# Patient Record
Sex: MALE | Race: ASIAN | NOT HISPANIC OR LATINO | Employment: FULL TIME | URBAN - METROPOLITAN AREA
[De-identification: names, ages, dates, MRNs, and addresses within clinical notes are randomized per-mention and may not be internally consistent; named-entity substitution may affect disease eponyms.]

---

## 2017-05-22 ENCOUNTER — TELEPHONE (OUTPATIENT)
Dept: FAMILY MEDICINE | Facility: CLINIC | Age: 47
End: 2017-05-22

## 2017-08-02 DIAGNOSIS — I25.10 CVD (CARDIOVASCULAR DISEASE): ICD-10-CM

## 2017-08-02 DIAGNOSIS — E78.5 HYPERLIPIDEMIA LDL GOAL <100: ICD-10-CM

## 2017-08-02 RX ORDER — ROSUVASTATIN CALCIUM 40 MG/1
40 TABLET, COATED ORAL DAILY
Qty: 90 TABLET | Refills: 1 | Status: SHIPPED | OUTPATIENT
Start: 2017-08-02 | End: 2017-11-03

## 2017-11-02 ENCOUNTER — PRE VISIT (OUTPATIENT)
Dept: CARDIOLOGY | Facility: CLINIC | Age: 47
End: 2017-11-02

## 2017-11-03 ENCOUNTER — OFFICE VISIT (OUTPATIENT)
Dept: CARDIOLOGY | Facility: CLINIC | Age: 47
End: 2017-11-03
Attending: INTERNAL MEDICINE
Payer: COMMERCIAL

## 2017-11-03 VITALS
BODY MASS INDEX: 27.97 KG/M2 | DIASTOLIC BLOOD PRESSURE: 82 MMHG | SYSTOLIC BLOOD PRESSURE: 118 MMHG | WEIGHT: 174 LBS | HEIGHT: 66 IN | HEART RATE: 103 BPM

## 2017-11-03 DIAGNOSIS — I25.2 OLD MYOCARDIAL INFARCTION: ICD-10-CM

## 2017-11-03 DIAGNOSIS — E78.5 HYPERLIPIDEMIA LDL GOAL <100: ICD-10-CM

## 2017-11-03 DIAGNOSIS — I25.10 CVD (CARDIOVASCULAR DISEASE): ICD-10-CM

## 2017-11-03 DIAGNOSIS — I10 BENIGN ESSENTIAL HYPERTENSION: ICD-10-CM

## 2017-11-03 PROCEDURE — 99214 OFFICE O/P EST MOD 30 MIN: CPT | Performed by: INTERNAL MEDICINE

## 2017-11-03 RX ORDER — ROSUVASTATIN CALCIUM 40 MG/1
40 TABLET, COATED ORAL DAILY
Qty: 90 TABLET | Refills: 3 | Status: SHIPPED | OUTPATIENT
Start: 2017-11-03 | End: 2018-11-19

## 2017-11-03 NOTE — LETTER
11/3/2017    [facility]  [address] [facility] [address]  [facility] [address]    RE: June Ronquillo       Dear Colleague,    I had the pleasure of seeing June Ronquillo in the [facility].    It is a pleasure for me to see Mr. Ronquillo.  This is a gentleman with a history of premature onset of coronary artery disease.  He presented with an acute inferoposterior STEMI in 2004.  He was treated emergently at [facility].  A 90% mid circumflex stenosis was successfully angioplastied.  A drug-eluting stent was placed.  He has done well since then.  He has no chest pain, shortness of breath.  He feels well.  Ejection fraction is normal.  He is tolerating all medications well.  Cardiac examination is normal.     Outpatient Encounter Prescriptions as of 11/3/2017   Medication Sig Dispense Refill     rosuvastatin (CRESTOR) 40 MG tablet Take 1 tablet (40 mg) by mouth daily 90 tablet 3     azithromycin (ZITHROMAX Z-DONA) 250 MG tablet two tablets first day and 1 tablet daily for 4 days 6 tablet 0     atovaquone-proguanil (MALARONE) 250-100 MG per tablet Take 1 tablet by mouth daily Start 2 days before exposure to Malaria and continue daily till  7 days after exposure. 23 tablet 0     ASPIRIN 81 MG OR TABS 1 TABLET DAILY       FISH OIL 1000 MG OR CAPS 1 bid       [DISCONTINUED] rosuvastatin (CRESTOR) 40 MG tablet Take 1 tablet (40 mg) by mouth daily 90 tablet 1     [DISCONTINUED] fluticasone (FLONASE) 50 MCG/ACT spray Spray 1-2 sprays into both nostrils daily 16 g 0     [DISCONTINUED] guaiFENesin-codeine (ROBITUSSIN AC) 100-10 MG/5ML SOLN solution Take 5 mLs by mouth every 4 hours as needed for cough 120 mL 0     No facility-administered encounter medications on file as of 11/3/2017.       IMPRESSION:   1.  Stable coronary artery disease.   2.  Blood pressure.  Appears definitely better this year.   3.  Hyperlipidemia.  He is on high dose Crestor.  Last year's LDL is 72 which I think  is excellent.  I informed him that there is no need to check his lipid numbers on a regular basis.      I wished him well.  I will see him again in a year's time for further followup.     Again, thank you for allowing me to participate in the care of your patient.      Sincerely,    DR GRACIELA DEL CID MD     [facility]

## 2017-11-03 NOTE — MR AVS SNAPSHOT
"              After Visit Summary   11/3/2017    June Ronquillo    MRN: [id number]           Patient Information     Date Of Birth          1970        Visit Information        Provider Department      11/3/2017 2:45 PM Dominguez Craig MD [facility]        Today's Diagnoses     Old myocardial infarction        Benign essential hypertension        Hyperlipidemia LDL goal <100           Follow-ups after your visit        Who to contact     If you have questions or need follow up information about today's clinic visit or your schedule please contact [facility] directly at [phone].  Normal or non-critical lab and imaging results will be communicated to you by HotPad[employer]art, letter or phone within 4 business days after the clinic has received the results. If you do not hear from us within 7 days, please contact the clinic through HotPadshart or phone. If you have a critical or abnormal lab result, we will notify you by phone as soon as possible.  Submit refill requests through [employer] or call your pharmacy and they will forward the refill request to us. Please allow 3 business days for your refill to be completed.          Additional Information About Your Visit        MyChart Information     [employer] gives you secure access to your electronic health record. If you see a primary care provider, you can also send messages to your care team and make appointments. If you have questions, please call your primary care clinic.  If you do not have a primary care provider, please call [phone] and they will assist you.        Care EveryWhere ID     This is your Care EveryWhere ID. This could be used by other organizations to access your [place] medical records  [id number]        Your Vitals Were     Pulse Height BMI (Body Mass Index)             103 1.676 m (5' 6\") 28.08 kg/m2          Blood Pressure from Last 3 Encounters:   11/03/17 " 118/82   12/15/16 (!) 136/91   11/01/16 138/90    Weight from Last 3 Encounters:   11/03/17 78.9 kg (174 lb)   12/15/16 77.1 kg (170 lb)   11/09/16 78 kg (172 lb)              We Performed the Following     Follow-Up with Cardiologist        Primary Care Provider Office Phone # Fax #    [facility] [phone]       [address] [address]        Equal Access to Services     EDIE AHMADI : Hadii aad ku hadasho Soomaali, waaxda luqadaha, qaybta kaalmada adeegyada, waxay idiin hayaan adeza wooten . So [facility] [place] [facility] [phone].    ATENCIÓN: Si habla español, tiene a alfaro disposición servicios gratuitos de asistencia lingüística. Zaynab al [phone].    We comply with applicable federal civil rights laws and [place] laws. We do not discriminate on the basis of race, color, national origin, age, disability, sex, sexual orientation, or gender identity.            Thank you!     Thank you for choosing [facility]  for your care. Our goal is always to provide you with excellent care. Hearing back from our patients is one way we can continue to improve our services. Please take a few minutes to complete the written survey that you may receive in the mail after your visit with us. Thank you!             Your Updated Medication List - Protect others around you: Learn how to safely use, store and throw away your medicines at www.disposemymeds.org.          This list is accurate as of: 11/3/17  3:14 PM.  Always use your most recent med list.                   Brand Name Dispense Instructions for use Diagnosis    aspirin 81 MG tablet      1 TABLET DAILY        atovaquone-proguanil 250-100 MG per tablet    MALARONE    23 tablet    Take 1 tablet by mouth daily Start 2 days before exposure to Malaria and continue daily till  7 days after exposure.    Travel advice encounter       azithromycin 250 MG tablet    ZITHROMAX Z-DONA    6 tablet     two tablets first day and 1 tablet daily for 4 days    Productive cough       fish oil-omega-3 fatty acids 1000 MG capsule      1 bid        rosuvastatin 40 MG tablet    CRESTOR    90 tablet    Take 1 tablet (40 mg) by mouth daily    CVD (cardiovascular disease), Hyperlipidemia LDL goal <100

## 2017-11-03 NOTE — PROGRESS NOTES
HPI and Plan:   See dictation    No orders of the defined types were placed in this encounter.      Orders Placed This Encounter   Medications     rosuvastatin (CRESTOR) 40 MG tablet     Sig: Take 1 tablet (40 mg) by mouth daily     Dispense:  90 tablet     Refill:  3       Encounter Diagnoses   Name Primary?     Old myocardial infarction      Benign essential hypertension      Hyperlipidemia LDL goal <100      CVD (cardiovascular disease)        CURRENT MEDICATIONS:  Current Outpatient Prescriptions   Medication Sig Dispense Refill     rosuvastatin (CRESTOR) 40 MG tablet Take 1 tablet (40 mg) by mouth daily 90 tablet 3     azithromycin (ZITHROMAX Z-DONA) 250 MG tablet two tablets first day and 1 tablet daily for 4 days 6 tablet 0     atovaquone-proguanil (MALARONE) 250-100 MG per tablet Take 1 tablet by mouth daily Start 2 days before exposure to Malaria and continue daily till  7 days after exposure. 23 tablet 0     ASPIRIN 81 MG OR TABS 1 TABLET DAILY       FISH OIL 1000 MG OR CAPS 1 bid       [DISCONTINUED] rosuvastatin (CRESTOR) 40 MG tablet Take 1 tablet (40 mg) by mouth daily 90 tablet 1       ALLERGIES     Allergies   Allergen Reactions     Nkda [No Known Drug Allergies]        PAST MEDICAL HISTORY:  Past Medical History:   Diagnosis Date     Angina pectoris 2002     CAD (coronary artery disease)     s/p stent to CFX     Hyperlipidemia LDL goal <100      Hypertension        PAST SURGICAL HISTORY:  Past Surgical History:   Procedure Laterality Date     HEART CATH PTCA SINGLE VESSEL  10/10/2004    Stent to CFX - [facility]        FAMILY HISTORY:  Family History   Problem Relation Age of Onset     Hypertension Mother      Hyperlipidemia Mother      HEART DISEASE Paternal Grandmother      Hyperlipidemia Sister      Hyperlipidemia Sister        SOCIAL HISTORY:  Social History     Social History     Marital status:      Spouse name: N/A     Number of children: N/A     Years of education: N/A     Social  "History Main Topics     Smoking status: Never Smoker     Smokeless tobacco: Never Used     Alcohol use Yes      Comment: 1-2/mo or less     Drug use: No     Sexual activity: Yes     Partners: Female     Other Topics Concern     Parent/Sibling W/ Cabg, Mi Or Angioplasty Before 65f 55m? No     Caffeine Concern No     Special Diet No     Exercise No     Seat Belt Yes     Social History Narrative       Review of Systems:  Skin:        Eyes:  Positive for contacts  ENT:  Negative    Respiratory:  Negative    Cardiovascular:  Negative;palpitations;chest pain;cyanosis;exercise intolerance;lightheadedness;dizziness;syncope or near-syncope;edema    Gastroenterology: Negative    Genitourinary:  Negative    Musculoskeletal:  Negative    Neurologic:  Positive for headaches  Psychiatric:  Negative    Heme/Lymph/Imm:  Negative    Endocrine:  Negative      Physical Exam:  Vitals: /82 (BP Location: Left arm, Cuff Size: Adult Large)  Pulse 103  Ht 1.676 m (5' 6\")  Wt 78.9 kg (174 lb)  BMI 28.08 kg/m2    Constitutional:           Skin:             Head:           Eyes:           Lymph:      ENT:           Neck:           Respiratory:            Cardiac:                                                           GI:           Extremities and Muscular Skeletal:                 Neurological:           Psych:           Recent Lab Results:  LIPID RESULTS:  Lab Results   Component Value Date    CHOL 145 11/01/2016    HDL 42 11/01/2016    LDL 72 11/01/2016    TRIG 154 (H) 11/01/2016    CHOLHDLRATIO 4.2 06/19/2015       LIVER ENZYME RESULTS:  Lab Results   Component Value Date    AST 35 10/18/2012    ALT 23 10/18/2012       CBC RESULTS:  Lab Results   Component Value Date    WBC 8.1 10/18/2012    RBC 4.35 (L) 10/18/2012    HGB 13.8 10/18/2012    HCT 41.7 10/18/2012    MCV 96 10/18/2012    MCH 31.8 10/18/2012    MCHC 33.2 10/18/2012    RDW 14.2 10/18/2012     10/18/2012       BMP RESULTS:  Lab Results   Component Value Date    "  10/18/2012    POTASSIUM 4.1 10/18/2012    CHLORIDE 104 10/18/2012    CO2 19 (L) 10/18/2012    ANIONGAP 14 10/18/2012     (H) 10/18/2012    BUN 20 10/18/2012    CR 1.02 10/18/2012    GFRESTIMATED 79 10/18/2012    GFRESTBLACK >90 10/18/2012    HARDEEP 9.1 10/18/2012        A1C RESULTS:  No results found for: A1C    INR RESULTS:  No results found for: INR        CC  Dominguez Craig MD  [address]  [place] [address]

## 2017-11-03 NOTE — PROGRESS NOTES
HISTORY OF PRESENT ILLNESS:  It is a pleasure for me to see Mr. Ronquillo.  This is a gentleman with a history of premature onset of coronary artery disease.  He presented with an acute inferoposterior STEMI in .  He was treated emergently at [facility].  A 90% mid circumflex stenosis was successfully angioplastied.  A drug-eluting stent was placed.  He has done well since then.  He has no chest pain, shortness of breath.  He feels well.  Ejection fraction is normal.  He is tolerating all medications well.  Cardiac examination is normal.      IMPRESSION:   1.  Stable coronary artery disease.   2.  Blood pressure.  Appears definitely better this year.   3.  Hyperlipidemia.  He is on high dose Crestor.  Last year's LDL is 72 which I think is excellent.  I informed him that there is no need to check his lipid numbers on a regular basis.      I wished him well.  I will see him again in a year's time for further followup.         GRACIELA DEL CID MD, [facility]             D: 2017 15:30   T: 2017 15:43   MT: MIRELLA      Name:     LÁZARO RONQUILLO   MRN:      [id number]        Account:      [id number]   :      1970           Service Date: 2017      Document: [id number]

## 2018-09-17 ENCOUNTER — OFFICE VISIT (OUTPATIENT)
Dept: FAMILY MEDICINE | Facility: CLINIC | Age: 48
End: 2018-09-17
Payer: COMMERCIAL

## 2018-09-17 VITALS
OXYGEN SATURATION: 98 % | BODY MASS INDEX: 28.23 KG/M2 | WEIGHT: 174.9 LBS | HEART RATE: 85 BPM | SYSTOLIC BLOOD PRESSURE: 132 MMHG | TEMPERATURE: 97.9 F | RESPIRATION RATE: 18 BRPM | DIASTOLIC BLOOD PRESSURE: 84 MMHG

## 2018-09-17 DIAGNOSIS — L91.8 SKIN TAG: ICD-10-CM

## 2018-09-17 DIAGNOSIS — Z12.11 SPECIAL SCREENING FOR MALIGNANT NEOPLASMS, COLON: ICD-10-CM

## 2018-09-17 DIAGNOSIS — Z00.00 ROUTINE GENERAL MEDICAL EXAMINATION AT A HEALTH CARE FACILITY: Primary | ICD-10-CM

## 2018-09-17 DIAGNOSIS — Z23 NEED FOR PROPHYLACTIC VACCINATION AND INOCULATION AGAINST INFLUENZA: ICD-10-CM

## 2018-09-17 DIAGNOSIS — L81.9 CHANGING PIGMENTED SKIN LESION: ICD-10-CM

## 2018-09-17 PROCEDURE — 90471 IMMUNIZATION ADMIN: CPT | Performed by: NURSE PRACTITIONER

## 2018-09-17 PROCEDURE — 90686 IIV4 VACC NO PRSV 0.5 ML IM: CPT | Performed by: NURSE PRACTITIONER

## 2018-09-17 PROCEDURE — 99396 PREV VISIT EST AGE 40-64: CPT | Mod: 25 | Performed by: NURSE PRACTITIONER

## 2018-09-17 NOTE — PROGRESS NOTES
SUBJECTIVE:   CC: June Ronquillo is an 48 year old male who presents for preventative health visit.     Healthy Habits:    Do you get at least three servings of calcium containing foods daily (dairy, green leafy vegetables, etc.)? yes    Amount of exercise or daily activities, outside of work: 0 day(s) per week    Problems taking medications regularly: No    Medication side effects: No    Have you had an eye exam in the past two years? yes    Do you see a dentist twice per year? yes    Do you have sleep apnea, excessive snoring or daytime drowsiness? Wife says she thinks he has sleep apnea    History of CAD, saw Card 12/2013: from their note he had an acute inferoposterior myocardial infarction in 2004.  He was [facility] emerg[facility] at [facility]  No symptoms since, is on a statin     Today's PHQ-2 Score:   PHQ-2 ( 1999 [employer]) 5/17/2016 10/9/2014   Q1: Little interest or pleasure in doing things 0 0   Q2: Feeling down, depressed or hopeless 0 0   PHQ-2 Score 0 0       Abuse: Current or Past(Physical, Sexual or Emotional)- No  Do you feel safe in your environment - Yes    Social History   Substance Use Topics     Smoking status: Never Smoker     Smokeless tobacco: Never Used     Alcohol use Yes      Comment: 1-2/mo or less      If you drink alcohol do you typically have >3 drinks per day or >7 drinks per week? No                      Last PSA: No results found for: PSA    Reviewed orders with patient. Reviewed health maintenance and updated orders accordingly - Yes  Labs reviewed in EPIC  BP Readings from Last 3 Encounters:   09/17/18 132/84   11/03/17 118/82   12/15/16 (!) 136/91    Wt Readings from Last 3 Encounters:   09/17/18 174 lb 14.4 oz (79.3 kg)   11/03/17 174 lb (78.9 kg)   12/15/16 170 lb (77.1 kg)                  Patient Active Problem List   Diagnosis     CARDIOVASCULAR SCREENING; LDL GOAL LESS THAN 100     FH: heart disease     Overweight (BMI 25.0-29.9)     Hyperlipidemia LDL goal <100      Concussion without loss of consciousness, initial encounter     Concussion without loss of consciousness, subsequent encounter     Hypertension     CAD (coronary artery disease)     Past Surgical History:   Procedure Laterality Date     HEART CATH PTCA SINGLE VESSEL  10/10/2004    Stent to CFX - [facility]        Social History   Substance Use Topics     Smoking status: Never Smoker     Smokeless tobacco: Never Used     Alcohol use Yes      Comment: 1-2/mo or less     Family History   Problem Relation Age of Onset     Hypertension Mother      Hyperlipidemia Mother      HEART DISEASE Paternal Grandmother      Hyperlipidemia Sister      Hyperlipidemia Sister          Current Outpatient Prescriptions   Medication Sig Dispense Refill     ASPIRIN 81 MG OR TABS 1 TABLET DAILY       FISH OIL 1000 MG OR CAPS 1 bid       rosuvastatin (CRESTOR) 40 MG tablet Take 1 tablet (40 mg) by mouth daily 90 tablet 3     atovaquone-proguanil (MALARONE) 250-100 MG per tablet Take 1 tablet by mouth daily Start 2 days before exposure to Malaria and continue daily till  7 days after exposure. (Patient not taking: Reported on 9/17/2018) 23 tablet 0     azithromycin (ZITHROMAX Z-DONA) 250 MG tablet two tablets first day and 1 tablet daily for 4 days (Patient not taking: Reported on 9/17/2018) 6 tablet 0     Allergies   Allergen Reactions     Nkda [No Known Drug Allergies]      Recent Labs   Lab Test  11/01/16   0709  05/11/16   0828  06/19/15   0916   10/18/12   0943   LDL  72  76  82   < >  113   HDL  42  45  41   < >  40   TRIG  154*  133  257*   < >  258*   ALT   --    --    --    --   23   CR   --    --    --    --   1.02   GFRESTIMATED   --    --    --    --   79   GFRESTBLACK   --    --    --    --   >90   POTASSIUM   --    --    --    --   4.1    < > = values in this interval not displayed.        Reviewed and updated as needed this visit by clinical staff  Tobacco  Allergies  Meds         Reviewed and updated as needed this  visit by Provider        Past Medical History:   Diagnosis Date     Angina pectoris 2002     CAD (coronary artery disease)     s/p stent to CFX     Hyperlipidemia LDL goal <100      Hypertension       Past Surgical History:   Procedure Laterality Date     HEART CATH PTCA SINGLE VESSEL  10/10/2004    Stent to CFX - [facility]        ROS:  CONSTITUTIONAL: NEGATIVE for fever, chills, change in weight  INTEGUMENTARY/SKIN: leg growth for 22 year, has enlarged no itching or pain, no history of skin or other cancer   EYES: NEGATIVE for vision changes or irritation  ENT: NEGATIVE for ear, mouth and throat problems  RESP: NEGATIVE for significant cough or SOB  CV: NEGATIVE for chest pain, palpitations or peripheral edema  GI: NEGATIVE for nausea, abdominal pain, heartburn, or change in bowel habits   male: negative for dysuria, hematuria, decreased urinary stream, erectile dysfunction, urethral discharge  MUSCULOSKELETAL: NEGATIVE for significant arthralgias or myalgia  NEURO: NEGATIVE for weakness, dizziness or paresthesias  PSYCHIATRIC: NEGATIVE for changes in mood or affect    OBJECTIVE:   /84  Pulse 85  Temp 97.9  F (36.6  C) (Oral)  Resp 18  Wt 174 lb 14.4 oz (79.3 kg)  SpO2 98%  BMI 28.23 kg/m2  EXAM:  GENERAL: healthy, alert and no distress  EYES: Eyes grossly normal to inspection, PERRL and conjunctivae and sclerae normal  HENT: ear canals and TM's normal, nose and mouth without ulcers or lesions  NECK: no adenopathy, no asymmetry, masses, or scars and thyroid normal to palpation  RESP: lungs clear to auscultation - no rales, rhonchi or wheezes  CV: regular rate and rhythm, normal S1 S2, no S3 or S4, no murmur, click or rub, no peripheral edema and peripheral pulses strong  ABDOMEN: soft, nontender, no hepatosplenomegaly, no masses and bowel sounds normal   (male): normal male genitalia without lesions or urethral discharge, no hernia  RECTAL: normal sphincter tone, no rectal masses, prostate  "normal size, smooth, nontender without nodules or masses  MS: no gross musculoskeletal defects noted, no edema  SKIN: right arch of foot irreg brown lesion, 3 mm, right posterior thigh skin tag 1 cm approx   NEURO: Normal strength and tone, mentation intact and speech normal  PSYCH: mentation appears normal, affect normal/bright    Diagnostic Test Results:  none     ASSESSMENT/PLAN:       ICD-10-CM    1. Routine general medical examination at a health care facility Z00.00    2. Need for prophylactic vaccination and inoculation against influenza Z23 FLU VACCINE, SPLIT VIRUS, IM (QUADRIVALENT) [80667]- >3 YRS     Vaccine Administration, Initial [47540]   3. Changing pigmented skin lesion L81.9 DERMATOLOGY REFERRAL    right foot   4. Skin tag L91.8 DERMATOLOGY REFERRAL   5. Special screening for malignant neoplasms, colon Z12.11 GASTROENTEROLOGY ADULT REF PROCEDURE ONLY       COUNSELING:  Reviewed preventive health counseling, as reflected in patient instructions    BP Readings from Last 1 Encounters:   09/17/18 132/84     Estimated body mass index is 28.23 kg/(m^2) as calculated from the following:    Height as of 11/3/17: 5' 6\" (1.676 m).    Weight as of this encounter: 174 lb 14.4 oz (79.3 kg).    BP Screening:   Last 3 BP Readings:    BP Readings from Last 3 Encounters:   09/17/18 132/84   11/03/17 118/82   12/15/16 (!) 136/91       The following was recommended to the patient:  Re-screen BP within a year and recommended lifestyle modifications  Weight management plan: Discussed healthy diet and exercise guidelines and patient will follow up in 12 months in clinic to re-evaluate.     reports that he has never smoked. He has never used smokeless tobacco.      Counseling Resources:  ATP IV Guidelines  Pooled Cohorts Equation Calculator  FRAX Risk Assessment  ICSI Preventive Guidelines  Dietary Guidelines for Americans, 2010  USDA's MyPlate  ASA Prophylaxis  Lung CA Screening    PATTY Stark CNP  [facility] " [facility]    Injectable Influenza Immunization Documentation    1.  Is the person to be vaccinated sick today?   No    2. Does the person to be vaccinated have an allergy to a component   of the vaccine?   No  Egg Allergy Algorithm Link    3. Has the person to be vaccinated ever had a serious reaction   to influenza vaccine in the past?   No    4. Has the person to be vaccinated ever had Guillain-Barré syndrome?   No    Form completed by La Ch[place] MA

## 2018-09-17 NOTE — MR AVS SNAPSHOT
After Visit Summary   9/17/2018    June Ronquillo    MRN: [id number]           Patient Information     Date Of Birth          1970        Visit Information        Provider Department      9/17/2018 2:00 PM Selma Johnson APRN [facility]        Today's Diagnoses     Routine general medical examination at a health care facility    -  1    Need for prophylactic vaccination and inoculation against influenza        Changing pigmented skin lesion        Skin tag        Special screening for malignant neoplasms, colon          Care Instructions      Preventive Health Recommendations  Male Ages 50 - 64    Yearly exam:             See your health care provider every year in order to  o   Review health changes.   o   Discuss preventive care.    o   Review your medicines if your doctor has prescribed any.     Have a cholesterol test every 5 years, or more frequently if you are at risk for high cholesterol/heart disease.     Have a diabetes test (fasting glucose) every three years. If you are at risk for diabetes, you should have this test more often.     Have a colonoscopy at age 50, or have a yearly FIT test (stool test). These exams will check for colon cancer.      Talk with your health care provider about whether or not a prostate cancer screening test (PSA) is right for you.    You should be tested each year for STDs (sexually transmitted diseases), if you re at risk.     Shots: Get a flu shot each year. Get a tetanus shot every 10 years.     Nutrition:    Eat at least 5 servings of fruits and vegetables daily.     Eat whole-grain bread, whole-wheat pasta and brown rice instead of white grains and rice.     Get adequate Calcium and Vitamin D.     Lifestyle    Exercise for at least 150 minutes a week (30 minutes a day, 5 days a week). This will help you control your weight and prevent disease.     Limit alcohol to one drink per day.     No smoking.     Wear sunscreen to  prevent skin cancer.     See your dentist every six months for an exam and cleaning.     See your eye doctor every 1 to 2 years.            Follow-ups after your visit        Additional Services     DERMATOLOGY REFERRAL       Your provider has referred you to: [facility]: Dermatology Clinic [facility] [phone]   [email or web address]  Right foot changing mole in arch, also skin tag right thigh he'd like removed.     Please be aware that coverage of these services is subject to the terms and limitations of your health insurance plan.  Call member services at your health plan with any benefit or coverage questions.      Please bring the following with you to your appointment:    (1) Any X-Rays, CTs or MRIs which have been performed.  Contact the facility where they were done to arrange for  prior to your scheduled appointment.    (2) List of current medications  (3) This referral request   (4) Any documents/labs given to you for this referral            GASTROENTEROLOGY ADULT REF PROCEDURE ONLY                 Who to contact     If you have questions or need follow up information about today's clinic visit or your schedule please contact [facility] directly at [phone].  Normal or non-critical lab and imaging results will be communicated to you by MyChart, letter or phone within 4 business days after the clinic has received the results. If you do not hear from us within 7 days, please contact the clinic through [employer] or phone. If you have a critical or abnormal lab result, we will notify you by phone as soon as possible.  Submit refill requests through [employer] or call your pharmacy and they will forward the refill request to us. Please allow 3 business days for your refill to be completed.          Additional Information About Your Visit        [employer] Information     [employer] gives you secure access to your electronic health record. If you see a primary  care provider, you can also send messages to your care team and make appointments. If you have questions, please call your primary care clinic.  If you do not have a primary care provider, please call [phone] and they will assist you.        Care EveryWhere ID     This is your Care EveryWhere ID. This could be used by other organizations to access your [place] medical records  [id number]        Your Vitals Were     Pulse Temperature Respirations Pulse Oximetry BMI (Body Mass Index)       85 97.9  F (36.6  C) (Oral) 18 98% 28.23 kg/m2        Blood Pressure from Last 3 Encounters:   09/17/18 132/84   11/03/17 118/82   12/15/16 (!) 136/91    Weight from Last 3 Encounters:   09/17/18 174 lb 14.4 oz (79.3 kg)   11/03/17 174 lb (78.9 kg)   12/15/16 170 lb (77.1 kg)              We Performed the Following     DERMATOLOGY REFERRAL     FLU VACCINE, SPLIT VIRUS, IM (QUADRIVALENT) [30951]- >3 YRS     GASTROENTEROLOGY ADULT REF PROCEDURE ONLY     Vaccine Administration, Initial [78816]        Primary Care Provider Office Phone # Fax #    [facility] [phone]       [address] [place] [address]        Equal Access to Services     EDIE AHMADI : Hadii niharika ku hadasho Soomaali, waaxda luqadaha, qaybta kaalmada adeegyada, waxay idiin hayswetan greg stewart. So [facility] [phone].    ATENCIÓN: Si habla español, tiene a alfaro disposición servicios gratuitos de asistencia lingüística. Llame al [phone].    We comply with applicable federal civil rights laws and [place] laws. We do not discriminate on the basis of race, color, national origin, age, disability, sex, sexual orientation, or gender identity.            Thank you!     Thank you for choosing [facility]  for your care. Our goal is always to provide you with excellent care. Hearing back from our patients is one way we can continue to improve our services. Please take a few minutes to complete the  written survey that you may receive in the mail after your visit with us. Thank you!             Your Updated Medication List - Protect others around you: Learn how to safely use, store and throw away your medicines at www.disposemymeds.org.          This list is accurate as of 9/17/18  2:47 PM.  Always use your most recent med list.                   Brand Name Dispense Instructions for use Diagnosis    aspirin 81 MG tablet      1 TABLET DAILY        fish oil-omega-3 fatty acids 1000 MG capsule      1 bid        rosuvastatin 40 MG tablet    CRESTOR    90 tablet    Take 1 tablet (40 mg) by mouth daily    CVD (cardiovascular disease), Hyperlipidemia LDL goal <100

## 2018-09-20 ENCOUNTER — OFFICE VISIT (OUTPATIENT)
Dept: DERMATOLOGY | Facility: CLINIC | Age: 48
End: 2018-09-20
Payer: COMMERCIAL

## 2018-09-20 DIAGNOSIS — L91.8 SKIN TAG: Primary | ICD-10-CM

## 2018-09-20 ASSESSMENT — PAIN SCALES - GENERAL: PAINLEVEL: NO PAIN (0)

## 2018-09-20 NOTE — LETTER
9/20/2018       RE: June Ronquillo  [address] [address]  [facility] [address]     Dear Colleague,    Thank you for referring your patient, June Ronquillo, to the [facility] DERMATOLOGY at [facility]. Please see a copy of my visit note below.    [facility] Dermatology Note      Dermatology Problem List:    Specialty Problems     None          CC:   Derm Problem (June is here to to discuss a mole on his foot and a skin tag. )        Encounter Date: Sep 20, 2018    History of Present Illness:  Mr. June Ronquillo is a 48 year old male who presents as a referral from Alex.  Never had skin cancers, but on planta pedis right foot a growing mole  Has as well some skin tags to check and maybe remove    Past Medical History:   Patient Active Problem List   Diagnosis     CARDIOVASCULAR SCREENING; LDL GOAL LESS THAN 100     FH: heart disease     Overweight (BMI 25.0-29.9)     Hyperlipidemia LDL goal <100     Concussion without loss of consciousness, initial encounter     Concussion without loss of consciousness, subsequent encounter     Hypertension     CAD (coronary artery disease)     Past Medical History:   Diagnosis Date     Angina pectoris 2002     CAD (coronary artery disease)     s/p stent to CFX     Hyperlipidemia LDL goal <100      Hypertension        Allergy History:     Allergies   Allergen Reactions     Nkda [No Known Drug Allergies]        Social History:     reports that he has never smoked. He has never used smokeless tobacco. He reports that he drinks alcohol. He reports that he does not use illicit drugs.      Family History:  Family History   Problem Relation Age of Onset     Hypertension Mother      Hyperlipidemia Mother      HEART DISEASE Paternal Grandmother      Hyperlipidemia Sister      Hyperlipidemia Sister        Medications:  Current Outpatient Prescriptions   Medication Sig Dispense Refill     ASPIRIN 81 MG OR TABS 1  TABLET DAILY       FISH OIL 1000 MG OR CAPS 1 bid       rosuvastatin (CRESTOR) 40 MG tablet Take 1 tablet (40 mg) by mouth daily 90 tablet 3           Review of Systems:  -As per HPI  -Constitutional: The patient denies fatigue, fevers, chills, unintended weight loss, and night sweats.  -HEENT: Patient denies nonhealing oral sores.  -Skin: As above in HPI. No additional skin concerns.    Physical exam:  Vitals: There were no vitals taken for this visit.  GEN: This is a well developed, well-nourished male in no acute distress, in a pleasant mood.    SKIN: Total skin excluding the undergarment areas was performed. The exam included the head/face, neck, both arms, chest, back, abdomen, both legs, digits and/or nails.   - On the right planta pedis a rather symmetrical, light brown mole with darker center (5x3mm). Dermatoscopically very symmetric network, no infiltration.  - buttock right a about 1cm diameter skin tag (irritated and bothers the patient a lot --> big skin tag)  - both axillae about 5mm diameter skin tags, as well irritated (in each axilla 2 tags)  - various exophytic, very dark, but very symmetrical moles on trunk    -No other lesions of concern on areas examined.     Impression/Plan:    1) irritated skin tags    Shave biopsy:  After discussion of benefits and risks including but not limited to bleeding/bruising, pain/swelling, infection, scar, incomplete removal, nerve damage/numbness, recurrence, and non-diagnostic biopsy, written consent, verbal consent and photographs were obtained. Time-out was performed. The area was cleaned with isopropyl alcohol. 0.5mL of 1% lidocaine with epinephrine was injected to obtain adequate anesthesia of the lesion on the 5 lesions. A removal by Kauterization was performed. . Vaseline and a sterile dressing were applied. The patient tolerated the procedure and no complications were noted. The patient was provided with verbal and written post care instructions.      Discussed with patient possibility of making Histology, But lesions look absolutely benign and therefore histology not necessary    2) plantar Naevus naevocellularis    Non-suspicious --> observe    Info ABCD given        Follow-up in 2 years or if lesion on foot changes        Pictures were placed in Pt's chart today for future reference.          Again, thank you for allowing me to participate in the care of your patient.      Sincerely,    Jose Ronquillo MD

## 2018-09-20 NOTE — PROGRESS NOTES
[facility] Dermatology Note      Dermatology Problem List:    Specialty Problems     None          CC:   Derm Problem (June is here to to discuss a mole on his foot and a skin tag. )        Encounter Date: Sep 20, 2018    History of Present Illness:  Mr. June Ronquillo is a 48 year old male who presents as a referral from [facility].  Never had skin cancers, but on planta pedis right foot a growing mole  Has as well some skin tags to check and maybe remove    Past Medical History:   Patient Active Problem List   Diagnosis     CARDIOVASCULAR SCREENING; LDL GOAL LESS THAN 100     FH: heart disease     Overweight (BMI 25.0-29.9)     Hyperlipidemia LDL goal <100     Concussion without loss of consciousness, initial encounter     Concussion without loss of consciousness, subsequent encounter     Hypertension     CAD (coronary artery disease)     Past Medical History:   Diagnosis Date     Angina pectoris 2002     CAD (coronary artery disease)     s/p stent to CFX     Hyperlipidemia LDL goal <100      Hypertension        Allergy History:     Allergies   Allergen Reactions     Nkda [No Known Drug Allergies]        Social History:     reports that he has never smoked. He has never used smokeless tobacco. He reports that he drinks alcohol. He reports that he does not use illicit drugs.      Family History:  Family History   Problem Relation Age of Onset     Hypertension Mother      Hyperlipidemia Mother      HEART DISEASE Paternal Grandmother      Hyperlipidemia Sister      Hyperlipidemia Sister        Medications:  Current Outpatient Prescriptions   Medication Sig Dispense Refill     ASPIRIN 81 MG OR TABS 1 TABLET DAILY       FISH OIL 1000 MG OR CAPS 1 bid       rosuvastatin (CRESTOR) 40 MG tablet Take 1 tablet (40 mg) by mouth daily 90 tablet 3           Review of Systems:  -As per HPI  -Constitutional: The patient denies fatigue, fevers, chills, unintended weight loss, and night  sweats.  -HEENT: Patient denies nonhealing oral sores.  -Skin: As above in HPI. No additional skin concerns.    Physical exam:  Vitals: There were no vitals taken for this visit.  GEN: This is a well developed, well-nourished male in no acute distress, in a pleasant mood.    SKIN: Total skin excluding the undergarment areas was performed. The exam included the head/face, neck, both arms, chest, back, abdomen, both legs, digits and/or nails.   - On the right planta pedis a rather symmetrical, light brown mole with darker center (5x3mm). Dermatoscopically very symmetric network, no infiltration.  - buttock right a about 1cm diameter skin tag (irritated and bothers the patient a lot --> big skin tag)  - both axillae about 5mm diameter skin tags, as well irritated (in each axilla 2 tags)  - various exophytic, very dark, but very symmetrical moles on trunk    -No other lesions of concern on areas examined.     Impression/Plan:    1) irritated skin tags    Shave biopsy:  After discussion of benefits and risks including but not limited to bleeding/bruising, pain/swelling, infection, scar, incomplete removal, nerve damage/numbness, recurrence, and non-diagnostic biopsy, written consent, verbal consent and photographs were obtained. Time-out was performed. The area was cleaned with isopropyl alcohol. 0.5mL of 1% lidocaine with epinephrine was injected to obtain adequate anesthesia of the lesion on the 5 lesions. A removal by Kauterization was performed. . Vaseline and a sterile dressing were applied. The patient tolerated the procedure and no complications were noted. The patient was provided with verbal and written post care instructions.     Discussed with patient possibility of making Histology, But lesions look absolutely benign and therefore histology not necessary    2) plantar Naevus naevocellularis    Non-suspicious --> observe    Info ABCD given        Follow-up in 2 years or if lesion on foot changes

## 2018-09-20 NOTE — PROGRESS NOTES
[facility] Dermatology Note      Dermatology Problem List:  1.***    Encounter Date: Sep 20, 2018    CC:  Chief Complaint   Patient presents with     Derm Problem     June is here to to discuss a mole on his foot and a skin tag.          History of Present Illness:  Mr. Jnue Ronquillo is a 48 year old male who {hpi:910619}    Past Medical History:   Patient Active Problem List   Diagnosis     CARDIOVASCULAR SCREENING; LDL GOAL LESS THAN 100     FH: heart disease     Overweight (BMI 25.0-29.9)     Hyperlipidemia LDL goal <100     Concussion without loss of consciousness, initial encounter     Concussion without loss of consciousness, subsequent encounter     Hypertension     CAD (coronary artery disease)     Past Medical History:   Diagnosis Date     Angina pectoris 2002     CAD (coronary artery disease)     s/p stent to CFX     Hyperlipidemia LDL goal <100      Hypertension      Past Surgical History:   Procedure Laterality Date     HEART CATH PTCA SINGLE VESSEL  10/10/2004    Stent to CFX - [facility]        Social History:   reports that he has never smoked. He has never used smokeless tobacco. He reports that he drinks alcohol. He reports that he does not use illicit drugs.    Family History:  Family History   Problem Relation Age of Onset     Hypertension Mother      Hyperlipidemia Mother      HEART DISEASE Paternal Grandmother      Hyperlipidemia Sister      Hyperlipidemia Sister        Medications:  Current Outpatient Prescriptions   Medication Sig Dispense Refill     ASPIRIN 81 MG OR TABS 1 TABLET DAILY       FISH OIL 1000 MG OR CAPS 1 bid       rosuvastatin (CRESTOR) 40 MG tablet Take 1 tablet (40 mg) by mouth daily 90 tablet 3     Allergies   Allergen Reactions     Nkda [No Known Drug Allergies]          Review of Systems:  -{ROS:220864}  -Constitutional: The patient denies fatigue, fevers, chills, unintended weight loss, and night sweats.  -HEENT: Patient denies  nonhealing oral sores.  -Skin: As above in HPI. No additional skin concerns.    Physical exam:  Vitals: There were no vitals taken for this visit.  GEN: {RFGeneralAppearance:060950}   SKIN: {Skin Exam:472404}  -***  -***  -***  -No other lesions of concern on areas examined.     Impression/Plan:  1. {Diagnosesderm:458632}    {dermmedicalstudent:740859}    ***    2. {Diagnosesderm:691061}    {dermmedicalstudent:833692}    ***    3. {Diagnosesderm:681789}    {dermmedicalstudent:572740}    ***    4. {Diagnosesderm:154084}    {dermmedicalstudent:380902}    ***    CC  *** on close of this encounter.  Follow-up {rfmultiple:470517} {follow up in days/weeks/months:745492}.     Staff Involved:  I,***, saw and examined the patient in the presence of  ***.

## 2018-09-20 NOTE — MR AVS SNAPSHOT
After Visit Summary   9/20/2018    June Ronquillo    MRN: [id number]           Patient Information     Date Of Birth          1970        Visit Information        Provider Department      9/20/2018 3:45 PM Jose Ronquillo MD [facility] Dermatology        Today's Diagnoses     Skin tag    -  1       Follow-ups after your visit        Who to contact     Please call your clinic at [phone] to:    Ask questions about your health    Make or cancel appointments    Discuss your medicines    Learn about your test results    Speak to your doctor            Additional Information About Your Visit        MyChart Information     [employer] gives you secure access to your electronic health record. If you see a primary care provider, you can also send messages to your care team and make appointments. If you have questions, please call your primary care clinic.  If you do not have a primary care provider, please call [phone] and they will assist you.      [employer] is an electronic gateway that provides easy, online access to your medical records. With [employer], you can request a clinic appointment, read your test results, renew a prescription or communicate with your care team.     To access your existing account, please contact your [facility] or call [phone] for assistance.        Care EveryWhere ID     This is your Care EveryWhere ID. This could be used by other organizations to access your [place] medical records  [id number]         Blood Pressure from Last 3 Encounters:   09/17/18 132/84   11/03/17 118/82   12/15/16 (!) 136/91    Weight from Last 3 Encounters:   09/17/18 79.3 kg (174 lb 14.4 oz)   11/03/17 78.9 kg (174 lb)   12/15/16 77.1 kg (170 lb)              We Performed the Following     DESTRUCT BENIGN LESION, UP TO 14        Primary Care Provider Office Phone # Fax #    [facility] [phone]       [address] [place] [address]  700  [facility] [address]        Equal Access to Services     [facility] GALDINO : Hadii aad ku hadgatoemilee [place], wascottiebrenda lópez, qalauryn callejas. So [place] [phone].    ATENCIÓN: Si habla español, tiene a alfaro disposición servicios gratuitos de asistencia lingüística. Llame al [phone].    We comply with applicable federal civil rights laws and [place] laws. We do not discriminate on the basis of race, color, national origin, age, disability, sex, sexual orientation, or gender identity.            Thank you!     Thank you for choosing [facility] DERMATOLOGY  for your care. Our goal is always to provide you with excellent care. Hearing back from our patients is one way we can continue to improve our services. Please take a few minutes to complete the written survey that you may receive in the mail after your visit with us. Thank you!             Your Updated Medication List - Protect others around you: Learn how to safely use, store and throw away your medicines at www.disposemymeds.org.          This list is accurate as of 9/20/18  4:33 PM.  Always use your most recent med list.                   Brand Name Dispense Instructions for use Diagnosis    aspirin 81 MG tablet      1 TABLET DAILY        fish oil-omega-3 fatty acids 1000 MG capsule      1 bid        rosuvastatin 40 MG tablet    CRESTOR    90 tablet    Take 1 tablet (40 mg) by mouth daily    CVD (cardiovascular disease), Hyperlipidemia LDL goal <100

## 2018-09-20 NOTE — PATIENT INSTRUCTIONS
The ABCDEs of Melanoma    Skin cancer can develop anywhere on the skin. Ask someone for help when checking your skin, especially in hard to see places. If you notice a mole different from others, or that changes, enlarges, itches, or bleeds (even if it is small), you should see a dermatologist.          Wound Care After a Biopsy    What is a skin biopsy?  A skin biopsy allows the doctor to examine a very small piece of tissue under the microscope to determine the diagnosis and the best treatment for the skin condition. A local anesthetic (numbing medicine)  is injected with a very small needle into the skin area to be tested. A small piece of skin is taken from the area. Sometimes a suture (stitch) is used.     What are the risks of a skin biopsy?  I will experience scar, bleeding, swelling, pain, crusting and redness. I may experience incomplete removal or recurrence. Risks of this procedure are excessive bleeding, bruising, infection, nerve damage, numbness, thick (hypertrophic or keloidal) scar and non-diagnostic biopsy.    How should I care for my wound for the first 24 hours?    Keep the wound dry and covered for 24 hours    If it bleeds, hold direct pressure on the area for 15 minutes. If bleeding does not stop then go to the emergency room    Avoid strenuous exercise the first 1-2 days or as your doctor instructs you    How should I care for the wound after 24 hours?    After 24 hours, remove the bandage    You may bathe or shower as normal    If you had a scalp biopsy, you can shampoo as usual and can use shower water to clean the biopsy site daily    Clean the wound twice a day with gentle soap and water    Do not scrub, be gentle    Apply white petroleum/Vaseline after cleaning the wound with a cotton swab or a clean finger, and keep the site covered with a Bandaid /bandage. Bandages are not necessary with a scalp biopsy    If you are unable to cover the site with a Bandaid /bandage, re-apply ointment 2-3  times a day to keep the site moist. Moisture will help with healing    Avoid strenuous activity for first 1-2 days    Avoid lakes, rivers, pools, and oceans until the stitches are removed or the site is healed    How do I clean my wound?    Wash hands thoroughly with soap or use hand  before all wound care    Clean the wound with gentle soap and water    Apply white petroleum/Vaseline  to wound after it is clean    Replace the Bandaid /bandage to keep the wound covered for the first few days or as instructed by your doctor    If you had a scalp biopsy, warm shower water to the area on a daily basis should suffice    What should I use to clean my wound?     Cotton-tipped applicators (Qtips )    White petroleum jelly (Vaseline ). Use a clean new container and use Q-tips to apply.    Bandaids   as needed    Gentle soap     How should I care for my wound long term?    Do not get your wound dirty    Keep up with wound care for one week or until the area is healed.    A small scab will form and fall off by itself when the area is completely healed. The area will be red and will become pink in color as it heals. Sun protection is very important for how your scar will turn out. Sunscreen with an SPF 30 or greater is recommended once the area is healed.    You should have some soreness but it should be mild and slowly go away over several days. Talk to your doctor about using tylenol for pain,    When should I call my doctor?  If you have increased:     Pain or swelling    Pus or drainage (clear or slightly yellow drainage is ok)    Temperature over 100F    Spreading redness or warmth around wound    When will I hear about my results?  The biopsy results can take 2-3 weeks to come back. The clinic will call you with the results, send you a [employer] message, or have you schedule a follow-up clinic or phone time to discuss the results. Contact our clinics if you do not hear from us in 3 weeks.     Who should I call  with questions?    [facility] [place]: [phone]     [facility]: [phone]    For urgent needs outside of business hours call the [facility] at [phone] and ask for the dermatology resident on call

## 2018-09-20 NOTE — NURSING NOTE
Dermatology Rooming Note    June Ronquillo's goals for this visit include:   Chief Complaint   Patient presents with     Derm Problem     June is here to to discuss a mole on his foot and a skin tag.      Lisseth Torres LPN

## 2018-10-02 ENCOUNTER — TELEPHONE (OUTPATIENT)
Dept: CARDIOLOGY | Facility: CLINIC | Age: 48
End: 2018-10-02

## 2018-10-02 DIAGNOSIS — E78.5 HYPERLIPIDEMIA LDL GOAL <100: Primary | ICD-10-CM

## 2018-10-02 NOTE — TELEPHONE ENCOUNTER
Patient left a message requesting a FLP at time of his next OV 12-14-18 He states his PMD mentioned he should have it done at the time of his OV.  Left message OK for patient to have FLP at OV last lipids 11-1-2016 orders placed.

## 2018-10-23 ENCOUNTER — TELEPHONE (OUTPATIENT)
Dept: GASTROENTEROLOGY | Facility: CLINIC | Age: 48
End: 2018-10-23

## 2018-10-23 DIAGNOSIS — Z12.11 SPECIAL SCREENING FOR MALIGNANT NEOPLASMS, COLON: Primary | ICD-10-CM

## 2018-10-23 NOTE — TELEPHONE ENCOUNTER
Patient scheduled for colonoscopy     Indication for procedure. Screening     Referring Provider. Selma Johnson     ? No     Arrival time verified? 905am    Facility location verified? [address] [facility], 5th floor     Instructions given regarding prep and procedure    Prep Type go lytely     Are you taking any anticoagulants or blood thinners? Aspirin     Instructions given? My chart    Electronic implanted devices? Denies - stents 13 years ago     Pre procedure teaching completed? Yes    Transportation from procedure? Spouse     H&P / Pre op physical completed? NA

## 2018-10-30 ENCOUNTER — HOSPITAL ENCOUNTER (OUTPATIENT)
Facility: AMBULATORY SURGERY CENTER | Age: 48
End: 2018-10-30
Attending: INTERNAL MEDICINE
Payer: COMMERCIAL

## 2018-10-30 ENCOUNTER — SURGERY (OUTPATIENT)
Age: 48
End: 2018-10-30

## 2018-10-30 VITALS
DIASTOLIC BLOOD PRESSURE: 91 MMHG | HEIGHT: 66 IN | OXYGEN SATURATION: 97 % | RESPIRATION RATE: 18 BRPM | BODY MASS INDEX: 28.12 KG/M2 | SYSTOLIC BLOOD PRESSURE: 140 MMHG | TEMPERATURE: 97.6 F | WEIGHT: 175 LBS

## 2018-10-30 LAB — COLONOSCOPY: NORMAL

## 2018-10-30 RX ORDER — ONDANSETRON 4 MG/1
4 TABLET, ORALLY DISINTEGRATING ORAL EVERY 6 HOURS PRN
Status: DISCONTINUED | OUTPATIENT
Start: 2018-10-30 | End: 2018-10-31 | Stop reason: HOSPADM

## 2018-10-30 RX ORDER — NALOXONE HYDROCHLORIDE 0.4 MG/ML
.1-.4 INJECTION, SOLUTION INTRAMUSCULAR; INTRAVENOUS; SUBCUTANEOUS
Status: DISCONTINUED | OUTPATIENT
Start: 2018-10-30 | End: 2018-10-31 | Stop reason: HOSPADM

## 2018-10-30 RX ORDER — FLUMAZENIL 0.1 MG/ML
0.2 INJECTION, SOLUTION INTRAVENOUS
Status: ACTIVE | OUTPATIENT
Start: 2018-10-30 | End: 2018-10-30

## 2018-10-30 RX ORDER — ONDANSETRON 2 MG/ML
4 INJECTION INTRAMUSCULAR; INTRAVENOUS EVERY 6 HOURS PRN
Status: DISCONTINUED | OUTPATIENT
Start: 2018-10-30 | End: 2018-10-31 | Stop reason: HOSPADM

## 2018-10-30 RX ORDER — ONDANSETRON 2 MG/ML
4 INJECTION INTRAMUSCULAR; INTRAVENOUS
Status: DISCONTINUED | OUTPATIENT
Start: 2018-10-30 | End: 2018-10-30 | Stop reason: HOSPADM

## 2018-10-30 RX ORDER — FENTANYL CITRATE 50 UG/ML
INJECTION, SOLUTION INTRAMUSCULAR; INTRAVENOUS PRN
Status: DISCONTINUED | OUTPATIENT
Start: 2018-10-30 | End: 2018-10-30 | Stop reason: HOSPADM

## 2018-10-30 RX ORDER — LIDOCAINE 40 MG/G
CREAM TOPICAL
Status: DISCONTINUED | OUTPATIENT
Start: 2018-10-30 | End: 2018-10-30 | Stop reason: HOSPADM

## 2018-10-30 RX ADMIN — FENTANYL CITRATE 50 MCG: 50 INJECTION, SOLUTION INTRAMUSCULAR; INTRAVENOUS at 10:03

## 2018-10-30 NOTE — OR NURSING
Procedure: Colonoscopy with polypectomy x6 , clip placed x2  Sedation: Conscious sedation (4 mg versed, 50 mcg fentanyl)  O2: 2 LPM NC, room air post  Tolerated: Well. VS stable during and post procedure. ETCO2 monitored continuously. Not c/o abdominal or chest pain  Specimens: 2 specimen jar(s) sent to lab  Report: Given to recovery RN  Pt to recovery area in stable condition, accompanied by RN    Kathy Ram RN

## 2018-10-31 LAB — COPATH REPORT: NORMAL

## 2018-11-19 DIAGNOSIS — E78.5 HYPERLIPIDEMIA LDL GOAL <100: ICD-10-CM

## 2018-11-19 DIAGNOSIS — I25.10 CVD (CARDIOVASCULAR DISEASE): ICD-10-CM

## 2018-11-19 RX ORDER — ROSUVASTATIN CALCIUM 40 MG/1
40 TABLET, COATED ORAL DAILY
Qty: 90 TABLET | Refills: 0 | Status: SHIPPED | OUTPATIENT
Start: 2018-11-19 | End: 2018-12-14

## 2018-12-14 ENCOUNTER — OFFICE VISIT (OUTPATIENT)
Dept: CARDIOLOGY | Facility: CLINIC | Age: 48
End: 2018-12-14
Payer: COMMERCIAL

## 2018-12-14 VITALS — WEIGHT: 172.2 LBS | BODY MASS INDEX: 27.68 KG/M2 | HEART RATE: 80 BPM | HEIGHT: 66 IN

## 2018-12-14 DIAGNOSIS — E78.5 HYPERLIPIDEMIA LDL GOAL <100: ICD-10-CM

## 2018-12-14 DIAGNOSIS — I25.10 CVD (CARDIOVASCULAR DISEASE): ICD-10-CM

## 2018-12-14 LAB
CHOLEST SERPL-MCNC: 157 MG/DL
HDLC SERPL-MCNC: 45 MG/DL
LDLC SERPL CALC-MCNC: 82 MG/DL
NONHDLC SERPL-MCNC: 112 MG/DL
TRIGL SERPL-MCNC: 152 MG/DL

## 2018-12-14 PROCEDURE — 36415 COLL VENOUS BLD VENIPUNCTURE: CPT | Performed by: INTERNAL MEDICINE

## 2018-12-14 PROCEDURE — 80061 LIPID PANEL: CPT | Performed by: INTERNAL MEDICINE

## 2018-12-14 PROCEDURE — 99214 OFFICE O/P EST MOD 30 MIN: CPT | Performed by: INTERNAL MEDICINE

## 2018-12-14 RX ORDER — ROSUVASTATIN CALCIUM 40 MG/1
40 TABLET, COATED ORAL DAILY
Qty: 90 TABLET | Refills: 3 | Status: SHIPPED | OUTPATIENT
Start: 2018-12-14 | End: 2019-11-12

## 2018-12-14 ASSESSMENT — MIFFLIN-ST. JEOR: SCORE: 1575.9

## 2018-12-14 NOTE — LETTER
12/14/2018      [facility]  [address] [facility] [address]  [facility] [place] [address]      RE: Lázaro Ronquillo       Dear Colleague,    I had the pleasure of seeing Lázaro Ronquillo in the [facility].    Service Date: 12/14/2018      HISTORY OF PRESENT ILLNESS:  It is always a pleasure for me to see Mr. Ronquillo.  I see him for coronary artery disease and cardiac risk factors.  This is a 48-year-old gentleman who presented with an acute inferoposterior STEMI in 2004.  He was admitted to [facility].  The culprit 90% mid circumflex stenosis was successfully revascularized with placement of a drug-eluting stent.  He had normal left ventricular systolic function.  He has been well since then with no repeat revascularizations.      He looks after himself well, though he is under stress at work.  He has no symptoms of coronary artery disease at all.        Cardiac examination is normal with the exception of his blood pressure, which is elevated today.  When I remeasured, it was still high at 140/90.  He is taking all his medications without fail.      IMPRESSION:   1.  Stable coronary artery disease.   2.  Dyslipidemia.  On high low-dose Crestor.  Last LDL is at 82, which I think is sufficient.   3.  Hypertension.      I think there is little doubt that this gentleman has hypertension, though I would like to exclude the possibility that he may just have white-coat hypertension.  I have asked him to monitor his blood pressure at home, measuring it just once a day.  Ideal level should be 130/80.  He will call us in 2-3 months' time and will let us know what the blood pressure has been like.  I think there is probably a good likelihood that he will have to be started on blood pressure medications.      I will look for to see him again in a year's time for further followup.         GRACIELA DEL CID MD, FACC             D: 12/14/2018   T: 12/14/2018   MT: MEENA      Name:     LÁZARO RONQUILLO    MRN:      [id number]        Account:      [id number]   :      1970           Service Date: 2018      Document: [id number]         Outpatient Encounter Medications as of 2018   Medication Sig Dispense Refill     ASPIRIN 81 MG OR TABS 1 TABLET DAILY       FISH OIL 1000 MG OR CAPS 1 bid       rosuvastatin (CRESTOR) 40 MG tablet Take 1 tablet (40 mg) by mouth daily 90 tablet 3     [DISCONTINUED] rosuvastatin (CRESTOR) 40 MG tablet Take 1 tablet (40 mg) by mouth daily 90 tablet 0     No facility-administered encounter medications on file as of 2018.        Again, thank you for allowing me to participate in the care of your patient.      Sincerely,    DR GRACIELA DEL CID MD     [facility]

## 2018-12-14 NOTE — LETTER
"12/14/2018    [facility]  [address]  [facility] [place] [address]    RE: June Ronquillo       Dear Colleague,    I had the pleasure of seeing June Ronquillo in the [facility].    No chest pain, no shortness of breath.  BP Readings from Last 1 Encounters:   12/14/18 (!) 156/98      Pulse Readings from Last 1 Encounters:   12/14/18 80      Resp Readings from Last 1 Encounters:   10/30/18 18      Temp Readings from Last 1 Encounters:   10/30/18 97.6  F (36.4  C) (Temporal)      SpO2 Readings from Last 1 Encounters:   10/30/18 97%      Wt Readings from Last 1 Encounters:   12/14/18 78.1 kg (172 lb 3.2 oz)      Ht Readings from Last 1 Encounters:   12/14/18 1.664 m (5' 5.5\")     JVP not elevated.  Heart sounds unchanged.  Peripheral pulses all intact.  No peripheral edema.    No components found for: TROPONINIES  Last Basic Metabolic Panel:  Lab Results   Component Value Date     10/18/2012      Lab Results   Component Value Date    POTASSIUM 4.1 10/18/2012     Lab Results   Component Value Date    CHLORIDE 104 10/18/2012     Lab Results   Component Value Date    HARDEEP 9.1 10/18/2012     Lab Results   Component Value Date    CO2 19 10/18/2012     Lab Results   Component Value Date    BUN 20 10/18/2012     Lab Results   Component Value Date    CR 1.02 10/18/2012     Lab Results   Component Value Date     10/18/2012       Lab Results   Component Value Date    WBC 8.1 10/18/2012     Lab Results   Component Value Date    RBC 4.35 10/18/2012     Lab Results   Component Value Date    HGB 13.8 10/18/2012     Lab Results   Component Value Date    HCT 41.7 10/18/2012     No components found for: MCT  Lab Results   Component Value Date    MCV 96 10/18/2012     Lab Results   Component Value Date    MCH 31.8 10/18/2012     Lab Results   Component Value Date    MCHC 33.2 10/18/2012     Lab Results   Component Value Date    RDW 14.2 10/18/2012     Lab " Results   Component Value Date     10/18/2012               Service Date: 2018      HISTORY OF PRESENT ILLNESS:  It is always a pleasure for me to see Mr. Ronquillo.  I see him for coronary artery disease and cardiac risk factors.  This is a 48-year-old gentleman who presented with an acute inferoposterior STEMI in .  He was admitted to [facility].  The culprit 90% mid circumflex stenosis was successfully revascularized with placement of a drug-eluting stent.  He had normal left ventricular systolic function.  He has been well since then with no repeat revascularizations.      He looks after himself well, though he is under stress at work.  He has no symptoms of coronary artery disease at all.        Cardiac examination is normal with the exception of his blood pressure, which is elevated today.  When I remeasured, it was still high at 140/90.  He is taking all his medications without fail.      IMPRESSION:   1.  Stable coronary artery disease.   2.  Dyslipidemia.  On high low-dose Crestor.  Last LDL is at 82, which I think is sufficient.   3.  Hypertension.      I think there is little doubt that this gentleman has hypertension, though I would like to exclude the possibility that he may just have white-coat hypertension.  I have asked him to monitor his blood pressure at home, measuring it just once a day.  Ideal level should be 130/80.  He will call us in 2-3 months' time and will let us know what the blood pressure has been like.  I think there is probably a good likelihood that he will have to be started on blood pressure medications.      I will look for to see him again in a year's time for further followup.         GRACIELA DEL CID MD, [facility]             D: 2018   T: 2018   MT: MEENA      Name:     LÁZARO RONQUILLO   MRN:      [id number]        Account:      [id number]   :      1970           Service Date: 2018      Document: [id number]       Thank you for allowing me to  participate in the care of your patient.      Sincerely,     DR GRACIELA DEL CID MD     [facility]    cc:   No referring provider defined for this encounter.

## 2018-12-14 NOTE — PROGRESS NOTES
Service Date: 2018      HISTORY OF PRESENT ILLNESS:  It is always a pleasure for me to see Mr. Ronquillo.  I see him for coronary artery disease and cardiac risk factors.  This is a 48-year-old gentleman who presented with an acute inferoposterior STEMI in .  He was admitted to [facility].  The culprit 90% mid circumflex stenosis was successfully revascularized with placement of a drug-eluting stent.  He had normal left ventricular systolic function.  He has been well since then with no repeat revascularizations.      He looks after himself well, though he is under stress at work.  He has no symptoms of coronary artery disease at all.        Cardiac examination is normal with the exception of his blood pressure, which is elevated today.  When I remeasured, it was still high at 140/90.  He is taking all his medications without fail.      IMPRESSION:   1.  Stable coronary artery disease.   2.  Dyslipidemia.  On high low-dose Crestor.  Last LDL is at 82, which I think is sufficient.   3.  Hypertension.      I think there is little doubt that this gentleman has hypertension, though I would like to exclude the possibility that he may just have white-coat hypertension.  I have asked him to monitor his blood pressure at home, measuring it just once a day.  Ideal level should be 130/80.  He will call us in 2-3 months' time and will let us know what the blood pressure has been like.  I think there is probably a good likelihood that he will have to be started on blood pressure medications.      I will look for to see him again in a year's time for further followup.         GRAC[facility] DEL CID MD, [facility]             D: 2018   T: 2018   MT: MEENA      Name:     LÁZARO RONQUILLO   MRN:      [id number]        Account:      [id number]   :      1970           Service Date: 2018      Document: [id number]

## 2018-12-14 NOTE — PROGRESS NOTES
"No chest pain, no shortness of breath.  BP Readings from Last 1 Encounters:   12/14/18 (!) 156/98      Pulse Readings from Last 1 Encounters:   12/14/18 80      Resp Readings from Last 1 Encounters:   10/30/18 18      Temp Readings from Last 1 Encounters:   10/30/18 97.6  F (36.4  C) (Temporal)      SpO2 Readings from Last 1 Encounters:   10/30/18 97%      Wt Readings from Last 1 Encounters:   12/14/18 78.1 kg (172 lb 3.2 oz)      Ht Readings from Last 1 Encounters:   12/14/18 1.664 m (5' 5.5\")     JVP not elevated.  Heart sounds unchanged.  Peripheral pulses all intact.  No peripheral edema.    No components found for: TROPONINIES  Last Basic Metabolic Panel:  Lab Results   Component Value Date     10/18/2012      Lab Results   Component Value Date    POTASSIUM 4.1 10/18/2012     Lab Results   Component Value Date    CHLORIDE 104 10/18/2012     Lab Results   Component Value Date    HARDEEP 9.1 10/18/2012     Lab Results   Component Value Date    CO2 19 10/18/2012     Lab Results   Component Value Date    BUN 20 10/18/2012     Lab Results   Component Value Date    CR 1.02 10/18/2012     Lab Results   Component Value Date     10/18/2012       Lab Results   Component Value Date    WBC 8.1 10/18/2012     Lab Results   Component Value Date    RBC 4.35 10/18/2012     Lab Results   Component Value Date    HGB 13.8 10/18/2012     Lab Results   Component Value Date    HCT 41.7 10/18/2012     No components found for: MCT  Lab Results   Component Value Date    MCV 96 10/18/2012     Lab Results   Component Value Date    MCH 31.8 10/18/2012     Lab Results   Component Value Date    MCHC 33.2 10/18/2012     Lab Results   Component Value Date    RDW 14.2 10/18/2012     Lab Results   Component Value Date     10/18/2012             "

## 2019-01-22 ENCOUNTER — OFFICE VISIT (OUTPATIENT)
Dept: FAMILY MEDICINE | Facility: CLINIC | Age: 49
End: 2019-01-22
Payer: COMMERCIAL

## 2019-01-22 VITALS
BODY MASS INDEX: 28.37 KG/M2 | HEART RATE: 85 BPM | RESPIRATION RATE: 18 BRPM | DIASTOLIC BLOOD PRESSURE: 104 MMHG | SYSTOLIC BLOOD PRESSURE: 144 MMHG | OXYGEN SATURATION: 100 % | TEMPERATURE: 98.1 F | WEIGHT: 173.1 LBS

## 2019-01-22 DIAGNOSIS — R42 VERTIGO: Primary | ICD-10-CM

## 2019-01-22 DIAGNOSIS — B97.89 VIRAL RESPIRATORY ILLNESS: ICD-10-CM

## 2019-01-22 DIAGNOSIS — J98.8 VIRAL RESPIRATORY ILLNESS: ICD-10-CM

## 2019-01-22 DIAGNOSIS — I10 ESSENTIAL HYPERTENSION: ICD-10-CM

## 2019-01-22 DIAGNOSIS — H65.93 OME (OTITIS MEDIA WITH EFFUSION), BILATERAL: ICD-10-CM

## 2019-01-22 PROCEDURE — 99214 OFFICE O/P EST MOD 30 MIN: CPT | Performed by: NURSE PRACTITIONER

## 2019-01-22 RX ORDER — LISINOPRIL 5 MG/1
5 TABLET ORAL DAILY
Qty: 90 TABLET | Refills: 3 | Status: SHIPPED | OUTPATIENT
Start: 2019-01-22 | End: 2019-04-26

## 2019-01-22 RX ORDER — MECLIZINE HYDROCHLORIDE 25 MG/1
25 TABLET ORAL 3 TIMES DAILY PRN
Qty: 30 TABLET | Refills: 1 | Status: SHIPPED | OUTPATIENT
Start: 2019-01-22 | End: 2019-02-01

## 2019-01-22 NOTE — PROGRESS NOTES
SUBJECTIVE:   June Ronquillo is a 48 year old male who presents to clinic today for the following health issues:    Dizziness  Onset: 1 week ago    Description:   Do you feel faint:  YES- for the whole week  Does it feel like the surroundings (bed, room) are moving: YES especially laying down   Unsteady/off balance: YES  Have you passed out or fallen: no     Intensity: moderate    Progression of Symptoms:  intermittent    Accompanying Signs & Symptoms:  Heart palpitations: no   Nausea, vomiting: YES queezy  Weakness in arms or legs: no   Fatigue: YES  Vision or speech changes: YES- 5-6 days ago speech was slower, right eye pain for the last 5 or 6 days  Ringing in ears (Tinnitus): no but YES to ear pain for last 5-6 days  Hearing Loss: no     History:   Head trauma/concussion hx: no   Previous similar symptoms: YES- 13 years ago  Recent bleeding history: no     Precipitating factors:   Worse with activity or head movement: YES  Any new medications (BP?): no   Alcohol/drug abuse/withdrawal: no     Alleviating factors:   Does staying in a fixed position give relief:  YES    Therapies Tried and outcome: Advil     Tried eye drops ear drops, home remedies   School consulting lot of ill contacts  No history of migraines     CAD, on statin no HTN med. LDL at [facility] , takes asa   BPs have been up in 150s/100s   for 6-7 months, he is now checking from home and reports they have been up since he saw Cardiology for CAD in Oct. And in their note they wanted to rule out white coat syndrome, pt does report they are usually lower at the end of the visit. He wasn't aware he should notify anyone if they are high, hasn't been on meds but has been gaining wt and admits lifestyle not as good     Problem list and histories reviewed & adjusted, as indicated.  Additional history: as documented    Patient Active Problem List   Diagnosis     CARDIOVASCULAR SCREENING; LDL GOAL LESS THAN 100     FH: heart disease     Overweight (BMI  25.0-29.9)     Hyperlipidemia LDL goal <100     Concussion without loss of consciousness, initial encounter     Concussion without loss of consciousness, subsequent encounter     Hypertension     CAD (coronary artery disease)     Past Surgical History:   Procedure Laterality Date     HEART CATH PTCA SINGLE VESSEL  10/10/2004    Stent to CFX - [facility]        Social History     Tobacco Use     Smoking status: Never Smoker     Smokeless tobacco: Never Used   Substance Use Topics     Alcohol use: Yes     Comment: 1-2/mo or less     Family History   Problem Relation Age of Onset     Hypertension Mother      Hyperlipidemia Mother      Heart Disease Paternal Grandmother      Hyperlipidemia Sister      Hyperlipidemia Sister          Current Outpatient Medications   Medication Sig Dispense Refill     ASPIRIN 81 MG OR TABS 1 TABLET DAILY       FISH OIL 1000 MG OR CAPS 1 bid       lisinopril (PRINIVIL/ZESTRIL) 5 MG tablet Take 1 tablet (5 mg) by mouth daily 90 tablet 3     meclizine (ANTIVERT) 25 MG tablet Take 1 tablet (25 mg) by mouth 3 times daily as needed for dizziness 30 tablet 1     rosuvastatin (CRESTOR) 40 MG tablet Take 1 tablet (40 mg) by mouth daily 90 tablet 3     Allergies   Allergen Reactions     Nkda [No Known Drug Allergies]      Recent Labs   Lab Test 12/14/18  0849 11/01/16  0709 05/11/16  0828  10/18/12  0943   LDL 82 72 76   < > 113   HDL 45 42 45   < > 40   TRIG 152* 154* 133   < > 258*   ALT  --   --   --   --  23   CR  --   --   --   --  1.02   GFRESTIMATED  --   --   --   --  79   GFRESTBLACK  --   --   --   --  >90   POTASSIUM  --   --   --   --  4.1    < > = values in this interval not displayed.      BP Readings from Last 3 Encounters:   01/22/19 (!) 144/104   10/30/18 (!) 140/91   09/17/18 132/84    Wt Readings from Last 3 Encounters:   01/22/19 173 lb 1.6 oz (78.5 kg)   12/14/18 172 lb 3.2 oz (78.1 kg)   10/30/18 175 lb (79.4 kg)                  Labs reviewed in EPIC    Reviewed and  updated as needed this visit by clinical staff  Allergies  Meds       Reviewed and updated as needed this visit by Provider         ROS:  Constitutional, HEENT, cardiovascular, pulmonary, GI, , musculoskeletal, neuro, skin, endocrine and psych systems are negative, except as otherwise noted.    OBJECTIVE:     BP (!) 144/104   Pulse 85   Temp 98.1  F (36.7  C) (Temporal)   Resp 18   Wt 173 lb 1.6 oz (78.5 kg)   SpO2 100%   BMI 28.37 kg/m    Body mass index is 28.37 kg/m .  GENERAL: healthy, alert and no distress  EYES: Eyes grossly normal to inspection, PERRL and conjunctivae and sclerae normal  HENT: normal cephalic/atraumatic, both ears: clear effusion, nose and mouth without ulcers or lesions, mild rhinorrhea noted, oropharynx clear and oral mucous membranes moist  NECK: no adenopathy, no asymmetry, masses, or scars and thyroid normal to palpation  RESP: lungs clear to auscultation - no rales, rhonchi or wheezes  CV: regular rate and rhythm, normal S1 S2, no S3 or S4, no murmur, click or rub, no peripheral edema and peripheral pulses strong  ABDOMEN: soft, nontender, no hepatosplenomegaly, no masses and bowel sounds normal  MS: no gross musculoskeletal defects noted, no edema  SKIN: no suspicious lesions or rashes  NEURO: Normal strength and tone, sensory exam grossly normal, mentation intact, cranial nerves 2-12 intact and rapid alternating movements normal  PSYCH: mentation appears normal, affect normal/bright    Diagnostic Test Results:  none     ASSESSMENT/PLAN:         ICD-10-CM    1. Vertigo R42 meclizine (ANTIVERT) 25 MG tablet   2. Viral respiratory illness J98.8     B97.89    3. OME (otitis media with effusion), bilateral H65.93    4. Essential hypertension I10 lisinopril (PRINIVIL/ZESTRIL) 5 MG tablet   vertigo and queasiness likely from OME perhaps mild viral illness caused it by pt history, discussed symptoms management, trial of meclizine and Return to Clinic if not clearing. Differential  mild migraine, no history. Also recommended he make sure eye exam utd he will schedule     HTN discussed, vs white coat, still was up and pt appeared calm, recommended starting small dose and work on lifestyle, pt agrees with plan. Follow up in 3-6 months or earlier prn     Patient Instructions       Patient Education     Common Middle Ear Problems    Your middle ear may have been injured or infected recently. Over time, certain growths or bone disease can also harm the middle ear. Left untreated, middle ear problems often lead to lifelong hearing loss. There are two types of hearing loss: conductive and sensorineural. One or both kinds can occur. Injury, infection, certain growths, or bone disease can cause your symptoms. A ruptured eardrum or a long-lasting (chronic) ear infection may be painful and decrease hearing.  Symptoms    Hearing loss in one or both ears    Fluid, often smelly, draining from the ear    Pain, pressure, or discomfort in the ear    Ringing in the ear  Conductive and sensorineural hearing loss  Sound waves may be disrupted before they reach the inner ear. If this happens, conductive hearing loss may occur. The ear canal can be blocked by wax, infection, a tumor, or a foreign object. The eardrum can be injured or infected. Abnormal bone growth, infection, or tumors in the middle ear can block sound waves.  Sound waves may not be processed correctly in the inner ear. If this happens, sensorineural hearing loss may occur. Permanent hearing loss is most commonly associated with sensorineural problems.  The tests and evaluations used to diagnose what type of hearing problem you have will depend on your symptoms.   Date Last Reviewed: 10/1/2016    3298-6777 The [employer]. [address] [place] [address], [place] [address]. All rights reserved. This information is not intended as a substitute for professional medical care. Always follow your healthcare professional's instructions.            Patient Education     Managing Dizziness (Vertigo) with Medicines    Although medicines can't cure your problem, they can help control symptoms. Your doctor may prescribe medicines for a few weeks and then taper them off. Always take your medicine as prescribed. Never share your medicine with others.  Contact your healthcare provider right away if you have side effects from your medicines.   How medicines can help    Treat infection or inflammation. If you have an infection caused by bacteria, your doctor can prescribe antibiotics.    Limit conflicting balance signals. These medicines are often in pill form.    Ease nausea. Suppositories, pills, or shots can reduce vomiting.    Reduce pressure in the canals. Diuretics can be used to treat Meniere's disease. These medicines help your body get rid of extra fluid.    Ease other symptoms. Other medicines can help ease depression and anxiety caused by living with dizziness or fainting.  Date Last Reviewed: 11/1/2016 2000-2018 [employer]. [address] [place] [address], [place] [address]. All rights reserved. This information is not intended as a substitute for professional medical care. Always follow your healthcare professional's instructions.               PATTY [facility]

## 2019-01-22 NOTE — PATIENT INSTRUCTIONS
Patient Education     Common Middle Ear Problems    Your middle ear may have been injured or infected recently. Over time, certain growths or bone disease can also harm the middle ear. Left untreated, middle ear problems often lead to lifelong hearing loss. There are two types of hearing loss: conductive and sensorineural. One or both kinds can occur. Injury, infection, certain growths, or bone disease can cause your symptoms. A ruptured eardrum or a long-lasting (chronic) ear infection may be painful and decrease hearing.  Symptoms    Hearing loss in one or both ears    Fluid, often smelly, draining from the ear    Pain, pressure, or discomfort in the ear    Ringing in the ear  Conductive and sensorineural hearing loss  Sound waves may be disrupted before they reach the inner ear. If this happens, conductive hearing loss may occur. The ear canal can be blocked by wax, infection, a tumor, or a foreign object. The eardrum can be injured or infected. Abnormal bone growth, infection, or tumors in the middle ear can block sound waves.  Sound waves may not be processed correctly in the inner ear. If this happens, sensorineural hearing loss may occur. Permanent hearing loss is most commonly associated with sensorineural problems.  The tests and evaluations used to diagnose what type of hearing problem you have will depend on your symptoms.   Date Last Reviewed: 10/1/2016    6197-3300 [employer]. [address] [place] [address], [place] [address]. All rights reserved. This information is not intended as a substitute for professional medical care. Always follow your healthcare professional's instructions.           Patient Education     Managing Dizziness (Vertigo) with Medicines    Although medicines can't cure your problem, they can help control symptoms. Your doctor may prescribe medicines for a few weeks and then taper them off. Always take your medicine as prescribed. Never share your medicine with  others.  Contact your healthcare provider right away if you have side effects from your medicines.   How medicines can help    Treat infection or inflammation. If you have an infection caused by bacteria, your doctor can prescribe antibiotics.    Limit conflicting balance signals. These medicines are often in pill form.    Ease nausea. Suppositories, pills, or shots can reduce vomiting.    Reduce pressure in the canals. Diuretics can be used to treat Meniere's disease. These medicines help your body get rid of extra fluid.    Ease other symptoms. Other medicines can help ease depression and anxiety caused by living with dizziness or fainting.  Date Last Reviewed: 11/1/2016 2000-2018 [employer]. [address] [place] [address], [place] [address]. All rights reserved. This information is not intended as a substitute for professional medical care. Always follow your healthcare professional's instructions.

## 2019-03-13 ENCOUNTER — TELEPHONE (OUTPATIENT)
Dept: FAMILY MEDICINE | Facility: CLINIC | Age: 49
End: 2019-03-13

## 2019-03-13 NOTE — TELEPHONE ENCOUNTER
Panel Management Review      Patient has the following on his problem list:     Hypertension   Last three blood pressure readings:  BP Readings from Last 3 Encounters:   01/22/19 (!) 144/104   10/30/18 (!) 140/91   09/17/18 132/84     Blood pressure: FAILED    HTN Guidelines:  Age 18-59 BP range:  Less than 140/90  Age 60-85 with Diabetes:  Less than 140/90  Age 60-85 without Diabetes:  less than 150/90      Composite cancer screening  Chart review shows that this patient is due/due soon for the following None  Summary:    Patient is due/failing the following:   BP CHECK    Action needed:   Patient needs office visit for Hypertension.    Type of outreach:    Sent [employer] message.    Questions for provider review:    None                                                                                                                                    Vianey Edwards MA

## 2019-03-13 NOTE — LETTER
[address]  [facility] [address]  Phone: [phone]  Fax: [phone]              March 21, 2019      June Ronquillo  [address] [address]  [facility] [address]        Dear June     In order to ensure we are providing the best quality care, we have reviewed your chart and see that you are due for:     1. Blood pressure check         Please call the clinic at your earliest convenience to schedule an appointment.  If you have completed these please contact our office via phone at [phone] or [employer] to update our records.  We would like to know the date (approximately month and year), the result, and ideally where the procedure was performed.     Thank you for trusting us with your health care.       Sincerely,     Care Team for Selma Johnson NP

## 2019-04-05 ENCOUNTER — OFFICE VISIT (OUTPATIENT)
Dept: FAMILY MEDICINE | Facility: CLINIC | Age: 49
End: 2019-04-05
Payer: COMMERCIAL

## 2019-04-05 VITALS
BODY MASS INDEX: 28.68 KG/M2 | DIASTOLIC BLOOD PRESSURE: 100 MMHG | TEMPERATURE: 97.8 F | RESPIRATION RATE: 18 BRPM | HEART RATE: 86 BPM | SYSTOLIC BLOOD PRESSURE: 146 MMHG | OXYGEN SATURATION: 99 % | WEIGHT: 175 LBS

## 2019-04-05 DIAGNOSIS — I10 ESSENTIAL HYPERTENSION: Primary | ICD-10-CM

## 2019-04-05 PROCEDURE — 99213 OFFICE O/P EST LOW 20 MIN: CPT | Performed by: NURSE PRACTITIONER

## 2019-04-05 RX ORDER — LISINOPRIL 10 MG/1
10 TABLET ORAL DAILY
Qty: 90 TABLET | Refills: 0 | Status: SHIPPED | OUTPATIENT
Start: 2019-04-05 | End: 2019-04-26

## 2019-04-05 NOTE — PATIENT INSTRUCTIONS
Patient Education   Follow up with a week  To recheck BP  Start taking Lisinopril 10 mg, once daily   Eating Heart-Healthy Foods  Eating has a big impact on your heart health. In fact, eating healthier can improve several of your heart risks at once. For instance, it helps you manage weight, cholesterol, and blood pressure. Here are ideas to help you make heart-healthy changes without giving up all the foods and flavors you love.  Getting started    Talk with your healthcare provider about eating plans, such as the DASH or Mediterranean diet. You may also be referred to a dietitian.    Change a few things at a time. Give yourself time to get used to a few eating changes before adding more.    Work to create a tasty, healthy eating plan that you can stick to for the rest of your life.    Goals for healthy eating  Below are some tips to improve your eating habits:    Limit saturated fats and trans fats. Saturated fats raise your levels of cholesterol, so keep these fats to a minimum. They are found in foods such as fatty meats, whole milk, cheese, and palm and coconut oils. Avoid trans fats because they lower good cholesterol as well as raise bad cholesterol. Trans fats are most often found in processed foods.    Reduce sodium (salt) intake. Eating too much salt may increase your blood pressure. Limit your sodium intake to 2,300 milligrams (mg) per day (the amount in 1 teaspoon of salt), or less if your healthcare provider recommends it. Dining out less often and eating fewer processed foods are two great ways to decrease the amount of salt you consume.    Managing calories. A calorie is a unit of energy. Your body burns calories for fuel, but if you eat more calories than your body burns, the extras are stored as fat. Your healthcare provider can help you create a diet plan to manage your calories. This will likely include eating healthier foods as well as exercising regularly. To help you track your progress, keep  a diary to record what you eat and how often you exercise.  Choose the right foods  Aim to make these foods staples of your diet. If you have diabetes, you may have different recommendations than what is listed here:    Fruits and vegetables provide plenty of nutrients without a lot of calories. At meals, fill half your plate with these foods. Split the other half of your plate between whole grains and lean protein.    Whole grains are high in fiber and rich in vitamins and nutrients. Good choices include whole-wheat bread, pasta, and brown rice.    Lean proteins give you nutrition with less fat. Good choices include fish, skinless chicken, and beans.    Low-fat or nonfat dairy provides nutrients without a lot of fat. Try low-fat or nonfat milk, cheese, or yogurt.    Healthy fats can be good for you in small amounts. These are unsaturated fats, such as olive oil, nuts, and fish. Try to have at least 2 servings per week of fatty fish, such as salmon, sardines, mackerel, rainbow trout, and albacore tuna. These contain omega-3 fatty acids, which are good for your heart. Flaxseed is another source of a heart-healthy fat.  More on heart-healthy eating  Read food labels  Healthy eating starts at the grocery store. Be sure to pay attention to food labels on packaged foods. Look for products that are high in fiber and protein, and low in saturated fat, cholesterol, and sodium. Avoid products that contain trans fat. And pay close attention to serving size. For instance, if you plan to eat two servings, double all the numbers on the label.  Prepare food right  A key part of healthy cooking is cutting down on added fat and salt. Look on the internet for lower-fat, lower-sodium recipes. Also, try these tips:    Remove fat from meat and skin from poultry before cooking.    Skim fat from the surface of soups and sauces.    Broil, boil, bake, steam, grill, and microwave food without added fats.    Choose ingredients that spice up  your food without adding calories, fat, or sodium. Try these items: horseradish, hot sauce, lemon, mustard, nonfat salad dressings, and vinegar. For salt-free herbs and spices, try basil, cilantro, cinnamon, pepper, and rosemary.  Date Last Reviewed: 10/1/2017    1901-2591 [employer]. [address] [place] [address]. All rights reserved. This information is not intended as a substitute for professional medical care. Always follow your healthcare professional's instructions.           Patient Education     Exercise for a Healthier Heart     Exercise with a friend. When activity is fun, you're more likely to stick with it.   You may wonder how you can improve the health of your heart. If you re thinking about exercise, you re on the right track. You don t need to become an athlete, but you do need a certain amount of brisk exercise to help strengthen your heart. If you have been diagnosed with a heart condition, your doctor may recommend exercise to help stabilize your condition. To help make exercise a habit, choose safe, fun activities.  Be sure to check with your healthcare provider before starting an exercise program.   Why exercise?  Exercising regularly offers many healthy rewards. It can help you do all of the following:    Improve your blood cholesterol level to help prevent further heart trouble    Lower your blood pressure to help prevent a stroke or heart attack    Control diabetes, or reduce your risk of getting this disease    Improve your heart and lung function    Reach and maintain a healthy weight    Make your muscles stronger and more limber so you can stay active    Prevent falls and fractures by slowing the loss of bone mass (osteoporosis)    Manage stress better    Reduce your blood pressure    Improve your sense of self and your body image  Exercise tips  Ease into your routine. Set small goals. Then build on them.  Exercise on most days. Aim for a total of 150 or more  minutes of moderate to  vigorous intensity activity each week. Consider 40 minutes, 3 to 4 times a week. For best results, activity should last for 40 minutes on average. It is OK to work up to the 40 minute period over time. Examples of moderate-intensity activity is walking 1 mile in 15 minutes or 30 to 45 minutes of yard work.  Step up your daily activity level. Along with your exercise program, try being more active throughout the day. Walk instead of drive. Do more household tasks or yard work.  Choose one or more activities you enjoy. Walking is one of the easiest things you can do. You can also try swimming, riding a bike, dancing, or taking an exercise class.  Stop exercising and call your doctor if you:    Have chest pain or feel dizzy or lightheaded    Feel burning, tightness, pressure, or heaviness in your chest, neck, shoulders, back, or arms    Have unusual shortness of breath    Have increased joint or muscle pain    Have palpitations or an irregular heartbeat   Date Last Reviewed: 5/1/2016 2000-2018 [employer]. [address] [place] [address], [place] [address]. All rights reserved. This information is not intended as a substitute for professional medical care. Always follow your healthcare professional's instructions.

## 2019-04-17 ENCOUNTER — OFFICE VISIT (OUTPATIENT)
Dept: FAMILY MEDICINE | Facility: CLINIC | Age: 49
End: 2019-04-17
Payer: COMMERCIAL

## 2019-04-17 VITALS
HEART RATE: 94 BPM | TEMPERATURE: 98.2 F | RESPIRATION RATE: 16 BRPM | DIASTOLIC BLOOD PRESSURE: 104 MMHG | SYSTOLIC BLOOD PRESSURE: 150 MMHG | OXYGEN SATURATION: 99 %

## 2019-04-17 DIAGNOSIS — Z71.84 TRAVEL ADVICE ENCOUNTER: Primary | ICD-10-CM

## 2019-04-17 PROCEDURE — 90715 TDAP VACCINE 7 YRS/> IM: CPT | Mod: GA | Performed by: NURSE PRACTITIONER

## 2019-04-17 PROCEDURE — 99401 PREV MED CNSL INDIV APPRX 15: CPT | Mod: 25 | Performed by: NURSE PRACTITIONER

## 2019-04-17 PROCEDURE — 86765 RUBEOLA ANTIBODY: CPT | Mod: GA | Performed by: NURSE PRACTITIONER

## 2019-04-17 PROCEDURE — 36415 COLL VENOUS BLD VENIPUNCTURE: CPT | Mod: GA | Performed by: NURSE PRACTITIONER

## 2019-04-17 PROCEDURE — 90471 IMMUNIZATION ADMIN: CPT | Mod: GA | Performed by: NURSE PRACTITIONER

## 2019-04-17 PROCEDURE — 86735 MUMPS ANTIBODY: CPT | Mod: GA | Performed by: NURSE PRACTITIONER

## 2019-04-17 PROCEDURE — 90746 HEPB VACCINE 3 DOSE ADULT IM: CPT | Mod: GA | Performed by: NURSE PRACTITIONER

## 2019-04-17 PROCEDURE — 86762 RUBELLA ANTIBODY: CPT | Mod: GA | Performed by: NURSE PRACTITIONER

## 2019-04-17 PROCEDURE — 90472 IMMUNIZATION ADMIN EACH ADD: CPT | Mod: GA | Performed by: NURSE PRACTITIONER

## 2019-04-17 RX ORDER — AZITHROMYCIN 500 MG/1
500 TABLET, FILM COATED ORAL DAILY
Qty: 3 TABLET | Refills: 0 | Status: SHIPPED | OUTPATIENT
Start: 2019-04-17 | End: 2019-04-20

## 2019-04-17 NOTE — NURSING NOTE
Screening Questionnaire for Adult Immunization    Are you sick today?   No   Do you have allergies to medications, food, a vaccine component or latex?   No   Have you ever had a serious reaction after receiving a vaccination?   No   Do you have a long-term health problem with heart disease, lung disease, asthma, kidney disease, metabolic disease (e.g. diabetes), anemia, or other blood disorder?   No   Do you have cancer, leukemia, HIV/AIDS, or any other immune system problem?   No   In the past 3 months, have you taken medications that affect  your immune system, such as prednisone, other steroids, or anticancer drugs; drugs for the treatment of rheumatoid arthritis, Crohn s disease, or psoriasis; or have you had radiation treatments?   No   Have you had a seizure, or a brain or other nervous system problem?   No   During the past year, have you received a transfusion of blood or blood     products, or been given immune (gamma) globulin or antiviral drug?   No   For women: Are you pregnant or is there a chance you could become        pregnant during the next month?   No   Have you received any vaccinations in the past 4 weeks?   No     Immunization questionnaire answers were all negative.        Per orders of RUBEN Corea, injection of Hep B and TDAP given by Clarita Wood. Patient instructed to remain in clinic for 15 minutes afterwards, and to report any adverse reaction to me immediately.       Screening performed by Clarita Wood on 4/17/2019 at 4:02 PM.

## 2019-04-17 NOTE — PROGRESS NOTES
Nurse Note      Itinerary:  [place]       Departure Date: 05/08/2019      Return Date: 05/29/2019      Length of Trip 21 days       Reason for Travel: Visiting friends and relatives           Urban or rural: both      Accommodations: Family home        IMMUNIZATION HISTORY  Have you received any immunizations within the past 4 weeks?  No  Have you ever fainted from having your blood drawn or from an injection?  No  Have you ever had a fever reaction to vaccination?  No  Have you ever had any bad reaction or side effect from any vaccination?  No  Have you ever had hepatitis A or B vaccine?  Yes  Do you live (or work closely) with anyone who has AIDS, an AIDS-like condition, any other immune disorder or who is on chemotherapy for cancer?  No  Do you have a family history of immunodeficiency?  No  Have you received any injection of immune globulin or any blood products during the past 12 months?  No    Patient roomed by GERRI Palmer S Yg is a 48 year old male seen today with family member for counsultation for international travel to [place] for Visiting friends and relatives.  Patient will be departing in  3 week(s) and staying for   3 week(s) and  traveling with family member(s).      Patient itinerary :  will be in the [facility] region of [facility] and [facility] which presents risk for food borne illnesses, motor vehicle accidents and Typhoid. exposure.      Patient's activities will include sightseeing and visiting friends and relatives.    Patient's country [place] birth is [place]    Special medical concerns: CAD  Pre-travel questionnaire was completed by patient and reviewed by provider.     Vitals: BP (!) 150/104   Pulse 94   Temp 98.2  F (36.8  C) (Oral)   Resp 16   SpO2 99%   BMI= There is no height or weight on file to calculate BMI.    EXAM:  General:  Well-nourished, well-developed in no acute distress.  Appears to be stated age, interacts appropriately and expresses understanding  of information given to patient.    Current Outpatient Medications   Medication Sig Dispense Refill     ASPIRIN 81 MG OR TABS 1 TABLET DAILY       FISH OIL 1000 MG OR CAPS 1 bid       lisinopril (PRINIVIL/ZESTRIL) 10 MG tablet Take 1 tablet (10 mg) by mouth daily 90 tablet 0     lisinopril (PRINIVIL/ZESTRIL) 5 MG tablet Take 1 tablet (5 mg) by mouth daily 90 tablet 3     rosuvastatin (CRESTOR) 40 MG tablet Take 1 tablet (40 mg) by mouth daily 90 tablet 3     Patient Active Problem List   Diagnosis     CARDIOVASCULAR SCREENING; LDL GOAL LESS THAN 100     FH: heart disease     Overweight (BMI 25.0-29.9)     Hyperlipidemia LDL goal <100     Concussion without loss of consciousness, initial encounter     Concussion without loss of consciousness, subsequent encounter     Hypertension     CAD (coronary artery disease)     Allergies   Allergen Reactions     Nkda [No Known Drug Allergies]          Immunizations discussed include:   Hepatitis A:  Up to date  Hepatitis B: give 3rd today  Influenza: Up to date  Typhoid: Up to date  Rabies: Declined  reviewed managment of a animal bite or scratch (washing wound, seek medical care within 24 hours for post exposure prophylaxis )  Yellow Fever: Not indicated  Japanese Encephalitis: Not indicated  Meningococcus: Not indicated  Tetanus/Diphtheria: Ordered/given today, risks, benefits and side effects reviewed  Measles/Mumps/Rubella: had disease and   Cholera: Not needed  Polio: Up to date  Pneumococcal: Under age of 65  Varicella: Immune by disease history per patient report  Zostavax:  Not indicated  Shingrix: deferred  HPV:  Not indicated  TB:  Low risk    Altitude Exposure on this trip: no  Past tolerance to Altitude: na    ASSESSMENT/PLAN:    ICD-10-CM    1. Travel advice encounter Z71.89 Rubeola Antibody IgG     Rubella Antibody IgG Quantitative     Mumps Antibody IgG     azithromycin (ZITHROMAX) 500 MG tablet     VACCINE ADMINISTRATION, INITIAL     VACCINE ADMINISTRATION, EACH  ADDITIONAL     I have reviewed general recommendations for safe travel   including: food/water precautions, insect precautions, safer sex   practices given high prevalence of Zika, HIV and other STDs,   roadway safety. Educational materials and [employer] report provided.    Malaraia prophylaxis recommended: none  Symptomatic treatment for traveler's diarrhea: azithromycin  Altitude illness prevention and treatment: na      Evacuation insurance advised and resources were provided to patient.    Total visit time 20 minutes  with over 50% of time spent counseling patient as detailed above.    Vi Corea CNP

## 2019-04-17 NOTE — PATIENT INSTRUCTIONS
Today April 17, 2019 you received the    Hepatitis B Vaccine -   Tetanus (Tdap) Vaccine  .    These appointments can be made as a NURSE ONLY visit.    **It is very important for the vaccinations to be given on the scheduled day(s), this helps ensure you receive the full effectiveness of the vaccine.**    Please call [facility] with any questions [phone]    Thank you for visiting [facility]

## 2019-04-17 NOTE — NURSING NOTE
"Chief Complaint   Patient presents with     Travel Clinic     Merged with [facility]      BP (!) 150/104   Pulse 94   Temp 98.2  F (36.8  C) (Oral)   Resp 16   SpO2 99%  Estimated body mass index is 28.68 kg/m  as calculated from the following:    Height as of 12/14/18: 1.664 m (5' 5.5\").    Weight as of 4/5/19: 79.4 kg (175 lb).  bp completed using cuff size: regular       Health Maintenance addressed:  BP was high, used pink card, recheck manually    Provider informed.    Clarita Wood MA     "

## 2019-04-18 LAB
MEV IGG SER QL IA: 4.5 AI (ref 0–0.8)
MUV IGG SER QL IA: 2.6 AI (ref 0–0.8)
RUBV IGG SERPL IA-ACNC: 46 IU/ML

## 2019-04-25 NOTE — PROGRESS NOTES
SUBJECTIVE:   June Ronquillo is a 48 year old male who presents to clinic today for the following   health issues:    Hypertension Follow-up      Outpatient blood pressures are being checked at store.  Results are usually high.    Low Salt Diet: not monitoring salt    Amount of exercise or physical activity: None    Problems taking medications regularly: Yes,  side effects    Medication side effects: headaches, cough, hacking, tired.     Diet: regular (no restrictions)    Questions/Concerns: Lisinopril is not working for him. Stopped taking the medication.     Stopped med and cough went away    Going to [facility] to visit parents for a month    Additional history: as documented    Reviewed  and updated as needed this visit by clinical staff         Reviewed and updated as needed this visit by Provider         Patient Active Problem List   Diagnosis     CARDIOVASCULAR SCREENING; LDL GOAL LESS THAN 100     FH: heart disease     Overweight (BMI 25.0-29.9)     Hyperlipidemia LDL goal <100     Concussion without loss of consciousness, initial encounter     Concussion without loss of consciousness, subsequent encounter     Hypertension     CAD (coronary artery disease)     Past Surgical History:   Procedure Laterality Date     HEART CATH PTCA SINGLE VESSEL  10/10/2004    Stent to X - [facility]        Social History     Tobacco Use     Smoking status: Never Smoker     Smokeless tobacco: Never Used   Substance Use Topics     Alcohol use: Yes     Comment: 1-2/mo or less     Family History   Problem Relation Age of Onset     Hypertension Mother      Hyperlipidemia Mother      Heart Disease Paternal Grandmother      Hyperlipidemia Sister      Hyperlipidemia Sister          Current Outpatient Medications   Medication Sig Dispense Refill     ASPIRIN 81 MG OR TABS 1 TABLET DAILY       FISH OIL 1000 MG OR CAPS 1 bid       rosuvastatin (CRESTOR) 40 MG tablet Take 1 tablet (40 mg) by mouth daily 90 tablet 3     Allergies  "  Allergen Reactions     Nkda [No Known Drug Allergies]      BP Readings from Last 3 Encounters:   04/26/19 (!) 150/98   04/17/19 (!) 150/104   04/05/19 (!) 146/100    Wt Readings from Last 3 Encounters:   04/26/19 79.2 kg (174 lb 11.2 oz)   04/05/19 79.4 kg (175 lb)   01/22/19 78.5 kg (173 lb 1.6 oz)                  Labs reviewed in EPIC    ROS:  Constitutional, HEENT, cardiovascular, pulmonary, gi and gu systems are negative, except as otherwise noted.    OBJECTIVE:     BP (!) 150/98   Pulse 74   Temp 98.1  F (36.7  C) (Oral)   Ht 1.66 m (5' 5.35\")   Wt 79.2 kg (174 lb 11.2 oz)   SpO2 97%   BMI 28.76 kg/m    Body mass index is 28.76 kg/m .  GENERAL: healthy, alert and no distress  RESP: Respiratory rate regular and breathing easily   CV: No abnormal color and extremities warm   MS: no gross musculoskeletal defects noted, no edema  NEURO: Normal strength and tone, mentation intact and speech normal    Diagnostic Test Results:  none     ASSESSMENT/PLAN:       ICD-10-CM    1. Hypertension, unspecified type I10 hydrochlorothiazide (HYDRODIURIL) 12.5 MG tablet   stopped lisinopril due to coughing was hesitant to try new med, discussed risks for untreated BP and he agrees to try hydrochlorothiazide, can increase dose if no side effects, Return to Clinic after he returns from [facility], continue working on lifestyle    Work on weight loss  Regular exercise    PATTY Stark [facility]      "

## 2019-04-26 ENCOUNTER — OFFICE VISIT (OUTPATIENT)
Dept: FAMILY MEDICINE | Facility: CLINIC | Age: 49
End: 2019-04-26
Payer: COMMERCIAL

## 2019-04-26 VITALS
HEIGHT: 65 IN | WEIGHT: 174.7 LBS | OXYGEN SATURATION: 97 % | BODY MASS INDEX: 29.11 KG/M2 | DIASTOLIC BLOOD PRESSURE: 98 MMHG | SYSTOLIC BLOOD PRESSURE: 150 MMHG | HEART RATE: 74 BPM | TEMPERATURE: 98.1 F

## 2019-04-26 DIAGNOSIS — I10 HYPERTENSION, UNSPECIFIED TYPE: Primary | ICD-10-CM

## 2019-04-26 PROCEDURE — 99213 OFFICE O/P EST LOW 20 MIN: CPT | Performed by: NURSE PRACTITIONER

## 2019-04-26 RX ORDER — HYDROCHLOROTHIAZIDE 12.5 MG/1
12.5 TABLET ORAL DAILY
Qty: 30 TABLET | Refills: 1 | Status: SHIPPED | OUTPATIENT
Start: 2019-04-26 | End: 2019-06-21

## 2019-04-26 ASSESSMENT — MIFFLIN-ST. JEOR: SCORE: 1584.92

## 2019-06-20 DIAGNOSIS — I10 HYPERTENSION, UNSPECIFIED TYPE: ICD-10-CM

## 2019-06-20 NOTE — TELEPHONE ENCOUNTER
"Requested Prescriptions   Pending Prescriptions Disp Refills     hydrochlorothiazide (HYDRODIURIL) 12.5 MG tablet  Last Written Prescription Date:  04/26/2019  Last Fill Quantity: 30,  # refills: 1   Last office visit: 4/26/2019 with prescribing provider:  04/26/2019   Future Office Visit:   30 tablet 1     Sig: Take 1 tablet (12.5 mg) by mouth daily And increase to 25 mg after a week if tolerated       Diuretics (Including Combos) Protocol Failed - 6/20/2019  9:50 AM        Failed - Blood pressure under 140/90 in past 12 months     BP Readings from Last 3 Encounters:   04/26/19 (!) 150/98   04/17/19 (!) 150/104   04/05/19 (!) 146/100                 Failed - Normal serum creatinine on file in past 12 months     Recent Labs   Lab Test 10/18/12  0943   CR 1.02              Failed - Normal serum potassium on file in past 12 months     Recent Labs   Lab Test 10/18/12  0943   POTASSIUM 4.1                    Failed - Normal serum sodium on file in past 12 months     Recent Labs   Lab Test 10/18/12  0943                 Passed - Recent (12 mo) or future (30 days) visit within the authorizing provider's specialty     Patient had office visit in the last 12 months or has a visit in the next 30 days with authorizing provider or within the authorizing provider's specialty.  See \"Patient Info\" tab in inbasket, or \"Choose Columns\" in Meds & Orders section of the refill encounter.              Passed - Medication is active on med list        Passed - Patient is age 18 or older        " Calculate Months Of Therapy Based On Documented Dosages (Will Hide Months Of Therapy Question)?: No

## 2019-06-21 RX ORDER — HYDROCHLOROTHIAZIDE 12.5 MG/1
12.5 TABLET ORAL DAILY
Qty: 30 TABLET | Refills: 0 | Status: SHIPPED | OUTPATIENT
Start: 2019-06-21 | End: 2019-07-05 | Stop reason: DRUGHIGH

## 2019-06-21 NOTE — TELEPHONE ENCOUNTER
Per chart review, patient due for follow up from 04/26/2019 office visit.    Called patient, informed him of this, patient verbalized understanding. Appointment scheduled 07/05/2019    Medication is being filled for 1 time refill only due to:  Patient needs to be seen because HTN F/U.     Jessica Ulloa, RN  Triage Nurse

## 2019-07-01 NOTE — PROGRESS NOTES
Subjective     June Ronquillo is a 49 year old male who presents to clinic today for the following health issues:    HPI   Hypertension Follow-up      Do you check your blood pressure regularly outside of the clinic? No     Are you following a low salt diet? No    Are your blood pressures ever more than 140 on the top number (systolic) OR more   than 90 on the bottom number (diastolic), for example 140/90? N/A     Patient Active Problem List   Diagnosis     CARDIOVASCULAR SCREENING; LDL GOAL LESS THAN 100     FH: heart disease     Overweight (BMI 25.0-29.9)     Hyperlipidemia LDL goal <100     Concussion without loss of consciousness, initial encounter     Concussion without loss of consciousness, subsequent encounter     Hypertension     CAD (coronary artery disease)     Past Surgical History:   Procedure Laterality Date     HEART CATH PTCA SINGLE VESSEL  10/10/2004    Stent to CFX - [facility]        Social History     Tobacco Use     Smoking status: Never Smoker     Smokeless tobacco: Never Used   Substance Use Topics     Alcohol use: Yes     Comment: 1-2/mo or less     Family History   Problem Relation Age of Onset     Hypertension Mother      Hyperlipidemia Mother      Heart Disease Paternal Grandmother      Hyperlipidemia Sister      Hyperlipidemia Sister          Current Outpatient Medications   Medication Sig Dispense Refill     ASPIRIN 81 MG OR TABS 1 TABLET DAILY       FISH OIL 1000 MG OR CAPS 1 bid       hydrochlorothiazide (HYDRODIURIL) 25 MG tablet Take 1 tablet (25 mg) by mouth daily 90 tablet 0     rosuvastatin (CRESTOR) 40 MG tablet Take 1 tablet (40 mg) by mouth daily 90 tablet 3     Allergies   Allergen Reactions     Nkda [No Known Drug Allergies]      Recent Labs   Lab Test 12/14/18  0849 11/01/16  0709 05/11/16  0828  10/18/12  0943   LDL 82 72 76   < > 113   HDL 45 42 45   < > 40   TRIG 152* 154* 133   < > 258*   ALT  --   --   --   --  23   CR  --   --   --   --  1.02   GFRESTIMATED   "--   --   --   --  79   GFRESTBLACK  --   --   --   --  >90   POTASSIUM  --   --   --   --  4.1    < > = values in this interval not displayed.      BP Readings from Last 3 Encounters:   07/05/19 (!) 148/95   04/26/19 (!) 150/98   04/17/19 (!) 150/104    Wt Readings from Last 3 Encounters:   07/05/19 80.1 kg (176 lb 9.6 oz)   04/26/19 79.2 kg (174 lb 11.2 oz)   04/05/19 79.4 kg (175 lb)                      Reviewed and updated as needed this visit by Provider         Review of Systems   ROS COMP: Constitutional, HEENT, cardiovascular, pulmonary, gi and gu systems are negative, except as otherwise noted.      Objective    BP (!) 148/95   Pulse 76   Temp 97.6  F (36.4  C) (Oral)   Resp 16   Ht 1.651 m (5' 5\")   Wt 80.1 kg (176 lb 9.6 oz)   SpO2 100%   BMI 29.39 kg/m    Body mass index is 29.39 kg/m .  Physical Exam   GENERAL: healthy, alert and no distress  EYES: Eyes grossly normal to inspection, PERRL and conjunctivae and sclerae normal  RESP: lungs clear to auscultation - no rales, rhonchi or wheezes  CV: regular rate and rhythm, normal S1 S2, no S3 or S4, no murmur, click or rub, no peripheral edema and peripheral pulses strong  ABDOMEN: soft, nontender, no hepatosplenomegaly, no masses and bowel sounds normal  NEURO: Normal strength and tone, mentation intact and speech normal    Diagnostic Test Results:  Labs reviewed in Epic  none         Assessment & Plan       ICD-10-CM    1. Hypertension, unspecified type I10 hydrochlorothiazide (HYDRODIURIL) 25 MG tablet   will increase hydrochlorothiazide dose, continue working on lifestyle and low salt diet pt travels a lot so discussed eating out choices for diet      BMI:   Estimated body mass index is 29.39 kg/m  as calculated from the following:    Height as of this encounter: 1.651 m (5' 5\").    Weight as of this encounter: 80.1 kg (176 lb 9.6 oz).   Weight management plan: Discussed healthy diet and exercise guidelines        Patient Instructions   You can " return within the week for a nurse only visit for a BP recheck    If normal on the new dose, you can return to see me in 3-6 months.   Otherwise return within a month.       Return in about 1 week (around 7/12/2019) for Nurse only BP check.    PATTY Stark [facility]

## 2019-07-05 ENCOUNTER — OFFICE VISIT (OUTPATIENT)
Dept: FAMILY MEDICINE | Facility: CLINIC | Age: 49
End: 2019-07-05
Payer: COMMERCIAL

## 2019-07-05 VITALS
DIASTOLIC BLOOD PRESSURE: 82 MMHG | OXYGEN SATURATION: 100 % | HEIGHT: 65 IN | TEMPERATURE: 97.6 F | SYSTOLIC BLOOD PRESSURE: 130 MMHG | WEIGHT: 176.6 LBS | HEART RATE: 76 BPM | BODY MASS INDEX: 29.42 KG/M2 | RESPIRATION RATE: 16 BRPM

## 2019-07-05 DIAGNOSIS — I10 HYPERTENSION, UNSPECIFIED TYPE: Primary | ICD-10-CM

## 2019-07-05 PROCEDURE — 99213 OFFICE O/P EST LOW 20 MIN: CPT | Performed by: NURSE PRACTITIONER

## 2019-07-05 RX ORDER — HYDROCHLOROTHIAZIDE 25 MG/1
25 TABLET ORAL DAILY
Qty: 90 TABLET | Refills: 0 | Status: SHIPPED | OUTPATIENT
Start: 2019-07-05 | End: 2019-10-28

## 2019-07-05 ASSESSMENT — MIFFLIN-ST. JEOR: SCORE: 1582.93

## 2019-07-05 NOTE — PATIENT INSTRUCTIONS
You can return within the week for a nurse only visit for a BP recheck    If normal on the new dose, you can return to see me in 3-6 months.   Otherwise return within a month.

## 2019-10-02 ENCOUNTER — HEALTH MAINTENANCE LETTER (OUTPATIENT)
Age: 49
End: 2019-10-02

## 2019-10-28 DIAGNOSIS — I10 HYPERTENSION, UNSPECIFIED TYPE: ICD-10-CM

## 2019-10-28 RX ORDER — HYDROCHLOROTHIAZIDE 25 MG/1
25 TABLET ORAL DAILY
Qty: 90 TABLET | Refills: 0 | Status: SHIPPED | OUTPATIENT
Start: 2019-10-28 | End: 2019-11-12

## 2019-10-28 NOTE — TELEPHONE ENCOUNTER
Providers,    Patient med filled. He was out a week ago. He scheduled appt for 11/12.  Medication sent.    Labs not current:  NA, K, creatinine,    BMP cued. Want any other labs?     Thanks  Mi Coronel RN   [facility]

## 2019-10-28 NOTE — TELEPHONE ENCOUNTER
"Requested Prescriptions   Pending Prescriptions Disp Refills     hydrochlorothiazide (HYDRODIURIL) 25 MG tablet 90 tablet 0     Sig: Take 1 tablet (25 mg) by mouth daily  Last Written Prescription Date:  07/05/2019  Last Fill Quantity: 90,  # refills: 0   Last office visit: 7/5/2019 with prescribing provider:  07/05/2019   Future Office Visit:   Next 5 appointments (look out 90 days)    Dec 18, 2019  7:45 AM CST  Return Visit with Dominguez Craig MD  [facility] ([facility]) [address] [place], [place] [address]  [facility] [id number] [phone] OPT 2              Diuretics (Including Combos) Protocol Failed - 10/28/2019 10:53 AM        Failed - Normal serum creatinine on file in past 12 months     Recent Labs   Lab Test 10/18/12  0943   CR 1.02              Failed - Normal serum potassium on file in past 12 months     Recent Labs   Lab Test 10/18/12  0943   POTASSIUM 4.1                    Failed - Normal serum sodium on file in past 12 months     Recent Labs   Lab Test 10/18/12  0943                 Passed - Blood pressure under 140/90 in past 12 months     BP Readings from Last 3 Encounters:   07/05/19 130/82   04/26/19 (!) 150/98   04/17/19 (!) 150/104                 Passed - Recent (12 mo) or future (30 days) visit within the authorizing provider's specialty     Patient has had an office visit with the authorizing provider or a provider within the authorizing providers department within the previous 12 mos or has a future within next 30 days. See \"Patient Info\" tab in inbasket, or \"Choose Columns\" in Meds & Orders section of the refill encounter.              Passed - Medication is active on med list        Passed - Patient is age 18 or older        "

## 2019-10-30 ENCOUNTER — ALLIED HEALTH/NURSE VISIT (OUTPATIENT)
Dept: NURSING | Facility: CLINIC | Age: 49
End: 2019-10-30
Payer: COMMERCIAL

## 2019-10-30 DIAGNOSIS — Z23 NEED FOR PROPHYLACTIC VACCINATION AND INOCULATION AGAINST INFLUENZA: Primary | ICD-10-CM

## 2019-10-30 PROCEDURE — 90471 IMMUNIZATION ADMIN: CPT

## 2019-10-30 PROCEDURE — 99207 ZZC NO CHARGE NURSE ONLY: CPT

## 2019-10-30 PROCEDURE — 90686 IIV4 VACC NO PRSV 0.5 ML IM: CPT

## 2019-10-31 ENCOUNTER — HEALTH MAINTENANCE LETTER (OUTPATIENT)
Age: 49
End: 2019-10-31

## 2019-10-31 NOTE — TELEPHONE ENCOUNTER
If he drinks alcohol please change BMP to CMP, he should do fasting lipid panel and if any anemia or thyroid symptoms/concerns I usually add those on. Then we can go over results at his visit if he does these at least a couple days before the appointment     Thanks!  [place]

## 2019-11-12 ENCOUNTER — OFFICE VISIT (OUTPATIENT)
Dept: FAMILY MEDICINE | Facility: CLINIC | Age: 49
End: 2019-11-12
Payer: COMMERCIAL

## 2019-11-12 VITALS
WEIGHT: 176.8 LBS | HEART RATE: 83 BPM | DIASTOLIC BLOOD PRESSURE: 94 MMHG | SYSTOLIC BLOOD PRESSURE: 144 MMHG | TEMPERATURE: 97.6 F | OXYGEN SATURATION: 97 % | BODY MASS INDEX: 29.42 KG/M2

## 2019-11-12 DIAGNOSIS — I25.10 CVD (CARDIOVASCULAR DISEASE): ICD-10-CM

## 2019-11-12 DIAGNOSIS — R73.09 ELEVATED GLUCOSE: ICD-10-CM

## 2019-11-12 DIAGNOSIS — I10 HYPERTENSION, UNSPECIFIED TYPE: Primary | ICD-10-CM

## 2019-11-12 DIAGNOSIS — E78.5 HYPERLIPIDEMIA LDL GOAL <100: ICD-10-CM

## 2019-11-12 LAB
ALBUMIN SERPL-MCNC: 3.9 G/DL (ref 3.4–5)
ALP SERPL-CCNC: 52 U/L (ref 40–150)
ALT SERPL W P-5'-P-CCNC: 45 U/L (ref 0–70)
ANION GAP SERPL CALCULATED.3IONS-SCNC: 7 MMOL/L (ref 3–14)
AST SERPL W P-5'-P-CCNC: 53 U/L (ref 0–45)
BILIRUB SERPL-MCNC: 0.4 MG/DL (ref 0.2–1.3)
BUN SERPL-MCNC: 16 MG/DL (ref 7–30)
CALCIUM SERPL-MCNC: 8.6 MG/DL (ref 8.5–10.1)
CHLORIDE SERPL-SCNC: 105 MMOL/L (ref 94–109)
CHOLEST SERPL-MCNC: 170 MG/DL
CO2 SERPL-SCNC: 27 MMOL/L (ref 20–32)
CREAT SERPL-MCNC: 0.96 MG/DL (ref 0.66–1.25)
GFR SERPL CREATININE-BSD FRML MDRD: >90 ML/MIN/{1.73_M2}
GLUCOSE SERPL-MCNC: 119 MG/DL (ref 70–99)
HDLC SERPL-MCNC: 37 MG/DL
LDLC SERPL CALC-MCNC: 103 MG/DL
NONHDLC SERPL-MCNC: 133 MG/DL
POTASSIUM SERPL-SCNC: 3.9 MMOL/L (ref 3.4–5.3)
PROT SERPL-MCNC: 6.9 G/DL (ref 6.8–8.8)
SODIUM SERPL-SCNC: 139 MMOL/L (ref 133–144)
TRIGL SERPL-MCNC: 148 MG/DL
TSH SERPL DL<=0.005 MIU/L-ACNC: 3.93 MU/L (ref 0.4–4)

## 2019-11-12 PROCEDURE — 80053 COMPREHEN METABOLIC PANEL: CPT | Performed by: NURSE PRACTITIONER

## 2019-11-12 PROCEDURE — 99213 OFFICE O/P EST LOW 20 MIN: CPT | Performed by: NURSE PRACTITIONER

## 2019-11-12 PROCEDURE — 36415 COLL VENOUS BLD VENIPUNCTURE: CPT | Performed by: NURSE PRACTITIONER

## 2019-11-12 PROCEDURE — 84443 ASSAY THYROID STIM HORMONE: CPT | Performed by: NURSE PRACTITIONER

## 2019-11-12 PROCEDURE — 80061 LIPID PANEL: CPT | Performed by: NURSE PRACTITIONER

## 2019-11-12 RX ORDER — ROSUVASTATIN CALCIUM 40 MG/1
40 TABLET, COATED ORAL DAILY
Qty: 90 TABLET | Refills: 3 | Status: SHIPPED | OUTPATIENT
Start: 2019-11-12 | End: 2020-12-28

## 2019-11-12 RX ORDER — HYDROCHLOROTHIAZIDE 25 MG/1
25 TABLET ORAL DAILY
Qty: 90 TABLET | Refills: 3 | Status: SHIPPED | OUTPATIENT
Start: 2019-11-12 | End: 2021-01-12

## 2019-11-12 NOTE — PROGRESS NOTES
Subjective     June Ronquillo is a 49 year old male who presents to clinic today for the following health issues:    HPI   Hypertension Follow-up      Do you check your blood pressure regularly outside of the clinic? No     Are you following a low salt diet? Yes    Are your blood pressures ever more than 140 on the top number (systolic) OR more   than 90 on the bottom number (diastolic), for example 140/90? Yes      How many servings of fruits and vegetables do you eat daily?  2-3    On average, how many sweetened beverages do you drink each day (soda, juice, sweet tea, etc)?   0    How many days per week do you miss taking your medication? 0    Ran out of med, thinks dose was still good, no side effects  Still struggles getting enough exercise and healthy food while working travelling a lot     Patient Active Problem List   Diagnosis     CARDIOVASCULAR SCREENING; LDL GOAL LESS THAN 100     FH: heart disease     Overweight (BMI 25.0-29.9)     Hyperlipidemia LDL goal <100     Concussion without loss of consciousness, initial encounter     Concussion without loss of consciousness, subsequent encounter     Hypertension     CAD (coronary artery disease)     Past Surgical History:   Procedure Laterality Date     HEART CATH PTCA SINGLE VESSEL  10/10/2004    Stent to [employer] - [facility]        Social History     Tobacco Use     Smoking status: Never Smoker     Smokeless tobacco: Never Used   Substance Use Topics     Alcohol use: Yes     Comment: 1-2/mo or less     Family History   Problem Relation Age of Onset     Hypertension Mother      Hyperlipidemia Mother      Heart Disease Paternal Grandmother      Hyperlipidemia Sister      Hyperlipidemia Sister          Current Outpatient Medications   Medication Sig Dispense Refill     ASPIRIN 81 MG OR TABS 1 TABLET DAILY       FISH OIL 1000 MG OR CAPS 1 bid       hydrochlorothiazide (HYDRODIURIL) 25 MG tablet Take 1 tablet (25 mg) by mouth daily 90 tablet 3      rosuvastatin (CRESTOR) 40 MG tablet Take 1 tablet (40 mg) by mouth daily 90 tablet 3     Allergies   Allergen Reactions     Nkda [No Known Drug Allergies]      BP Readings from Last 3 Encounters:   11/12/19 (!) 144/94   07/05/19 130/82   04/26/19 (!) 150/98    Wt Readings from Last 3 Encounters:   11/12/19 80.2 kg (176 lb 12.8 oz)   07/05/19 80.1 kg (176 lb 9.6 oz)   04/26/19 79.2 kg (174 lb 11.2 oz)                    Reviewed and updated as needed this visit by Provider         Review of Systems   ROS COMP: Constitutional, HEENT, cardiovascular, pulmonary, gi and gu systems are negative, except as otherwise noted.      Objective    BP (!) 144/94 (BP Location: Right arm, Patient Position: Sitting, Cuff Size: Adult Regular)   Pulse 83   Temp 97.6  F (36.4  C) (Oral)   Wt 80.2 kg (176 lb 12.8 oz)   SpO2 97%   BMI 29.42 kg/m    Body mass index is 29.42 kg/m .  Physical Exam   GENERAL: healthy, overweight, alert and no distress  NECK: no adenopathy, no asymmetry, masses, or scars and thyroid normal to palpation  RESP: lungs clear to auscultation - no rales, rhonchi or wheezes  CV: regular rate and rhythm, normal S1 S2, no S3 or S4, no murmur, click or rub, no peripheral edema and peripheral pulses strong  ABDOMEN: soft, nontender, no hepatosplenomegaly, no masses and bowel sounds normal  MS: no gross musculoskeletal defects noted, no edema  NEURO: Normal strength and tone, mentation intact and speech normal  PSYCH: mentation appears normal, affect normal/bright    Diagnostic Test Results:  Labs reviewed in Epic  No results found for this or any previous visit (from the past 24 hour(s)).        Assessment & Plan       ICD-10-CM    1. Hypertension, unspecified type I10 **Comprehensive metabolic panel FUTURE 1yr     Lipid panel reflex to direct LDL Fasting     hydrochlorothiazide (HYDRODIURIL) 25 MG tablet   2. CVD (cardiovascular disease) I25.10 rosuvastatin (CRESTOR) 40 MG tablet     TSH with free T4 reflex   3.  "Hyperlipidemia LDL goal <100 E78.5 rosuvastatin (CRESTOR) 40 MG tablet   4. BMI 29.0-29.9,adult Z68.29 TSH with free T4 reflex   5. Elevated glucose R73.09 Hemoglobin A1c   ran out of meds while traveling, will check labs, BP otherwise has been at goal  Lifestyle changes discussed, continue to work on     BMI:   Estimated body mass index is 29.42 kg/m  as calculated from the following:    Height as of 7/5/19: 1.651 m (5' 5\").    Weight as of this encounter: 80.2 kg (176 lb 12.8 oz).   Weight management plan: Discussed healthy diet and exercise guidelines        There are no Patient Instructions on file for this visit.    No follow-ups on file.    PATTY Stark [facility]    "

## 2020-02-07 ENCOUNTER — OFFICE VISIT (OUTPATIENT)
Dept: CARDIOLOGY | Facility: CLINIC | Age: 50
End: 2020-02-07
Payer: COMMERCIAL

## 2020-02-07 VITALS
DIASTOLIC BLOOD PRESSURE: 88 MMHG | WEIGHT: 178 LBS | HEART RATE: 89 BPM | BODY MASS INDEX: 29.62 KG/M2 | SYSTOLIC BLOOD PRESSURE: 132 MMHG

## 2020-02-07 DIAGNOSIS — E78.5 HYPERLIPIDEMIA LDL GOAL <100: Primary | ICD-10-CM

## 2020-02-07 DIAGNOSIS — I25.10 CORONARY ARTERY DISEASE INVOLVING NATIVE CORONARY ARTERY OF NATIVE HEART WITHOUT ANGINA PECTORIS: ICD-10-CM

## 2020-02-07 PROCEDURE — 99214 OFFICE O/P EST MOD 30 MIN: CPT | Performed by: INTERNAL MEDICINE

## 2020-02-07 NOTE — LETTER
2/7/2020    Selma Johnson, APRN CNP  [address]  [facility] [address]    RE: June Andersonn       Dear Colleague,    I had the pleasure of seeing June Ronquillo in the [facility].    HPI and Plan:   It is always a pleasure for me to see Mr. Ronquillo.  I see him for coronary artery disease and cardiac risk factors.  This is a 48-year-old gentleman who presented with an acute inferoposterior STEMI in 2004.  He was admitted to [facility]. The culprit 90% mid circumflex stenosis was successfully revascularized with placement of a drug-eluting stent.  He had normal left ventricular systolic function.  He has been well since then with no repeat revascularizations without recurrence .      He continues to work very hard as an  and is on the road a lot.  He tells me he was in a hometown [place] last year.  He got winded while climbing some very steep steps but I feel quite sure that this is not due to recurrence of significant coronary artery disease as he has not experienced any chest pains or significant shortness of breath since then.  Due to his travel schedule he has not been very prudent with his diet and has gained weight.  He has not been able to exercise too much either.  I am happy to see that his blood pressure is normal.  Cardiac examination is normal as well.     His latest LDL is 103.  It represents a 21 point increase when compared with the numbers in 2018.  He tells me that this was not a fasting sample and he will have it rechecked in the next few weeks.  I told him target would be 70 or less.  He will call if they have any questions.     IMPRESSION:   1.  Stable coronary artery disease.   2.  Dyslipidemia.  On high low-dose Crestor.     As above.  He may need addition of Zetia   3.  Hypertension , appears stable.  Advised home monitoring.  4.  Obesity.  Advised a target weight loss of 20 pounds before I see him again in a years  time.      Orders Placed This Encounter   Procedures     Follow-Up with Cardiologist       No orders of the defined types were placed in this encounter.      Encounter Diagnoses   Name Primary?     Hyperlipidemia LDL goal <100 Yes     Coronary artery disease involving native coronary artery of native heart without angina pectoris        CURRENT MEDICATIONS:  Current Outpatient Medications   Medication Sig Dispense Refill     ASPIRIN 81 MG OR TABS 1 TABLET DAILY       FISH OIL 1000 MG OR CAPS 1 bid       hydrochlorothiazide (HYDRODIURIL) 25 MG tablet Take 1 tablet (25 mg) by mouth daily 90 tablet 3     rosuvastatin (CRESTOR) 40 MG tablet Take 1 tablet (40 mg) by mouth daily 90 tablet 3       ALLERGIES     Allergies   Allergen Reactions     Nkda [No Known Drug Allergies]        PAST MEDICAL HISTORY:  Past Medical History:   Diagnosis Date     Angina pectoris 2002     CAD (coronary artery disease)     s/p stent to CFX     Hyperlipidemia LDL goal <100      Hypertension        PAST SURGICAL HISTORY:  Past Surgical History:   Procedure Laterality Date     HEART CATH PTCA SINGLE VESSEL  10/10/2004    Stent to CFX - [facility]        FAMILY HISTORY:  Family History   Problem Relation Age of Onset     Hypertension Mother      Hyperlipidemia Mother      Heart Disease Paternal Grandmother      Hyperlipidemia Sister      Hyperlipidemia Sister        SOCIAL HISTORY:  Social History     Socioeconomic History     Marital status:      Spouse name: None     Number of children: None     Years of education: None     Highest education level: None   Occupational History     None   Social Needs     Financial resource strain: None     Food insecurity:     Worry: None     Inability: None     Transportation needs:     Medical: None     Non-medical: None   Tobacco Use     Smoking status: Never Smoker     Smokeless tobacco: Never Used   Substance and Sexual Activity     Alcohol use: Yes     Comment: 1-2/mo or less     Drug use: No      Sexual activity: Yes     Partners: Female   Lifestyle     Physical activity:     Days per week: None     Minutes per session: None     Stress: None   Relationships     Social connections:     Talks on phone: None     Gets together: None     Attends Protestant service: None     Active member of club or organization: None     Attends meetings of clubs or organizations: None     Relationship status: None     Intimate partner violence:     Fear of current or ex partner: None     Emotionally abused: None     Physically abused: None     Forced sexual activity: None   Other Topics Concern     Parent/sibling w/ CABG, MI or angioplasty before 65F 55M? No      Service Not Asked     Blood Transfusions Not Asked     Caffeine Concern No     Occupational Exposure Not Asked     Hobby Hazards Not Asked     Sleep Concern Not Asked     Stress Concern Not Asked     Weight Concern Not Asked     Special Diet No     Back Care Not Asked     Exercise No     Bike Helmet Not Asked     Seat Belt Yes     Self-Exams Not Asked   Social History Narrative     None       Review of Systems:  Skin:  Negative     Eyes:  Positive for contacts  ENT:  Negative    Respiratory:  Negative    Cardiovascular:  Negative    Gastroenterology: Negative    Genitourinary:  Negative    Musculoskeletal:  Negative    Neurologic:  Negative    Psychiatric:  Negative    Heme/Lymph/Imm:  Negative    Endocrine:  Negative      Physical Exam:  Vitals: /88   Pulse 89   Wt 80.7 kg (178 lb)   BMI 29.62 kg/m       Constitutional:  cooperative, alert and oriented, well developed, well nourished, in no acute distress        Skin:  warm and dry to the touch, no apparent skin lesions or masses noted          Head:  normocephalic, no masses or lesions        Eyes:  pupils equal and round, conjunctivae and lids unremarkable, sclera white, no xanthalasma, EOMS intact, no nystagmus        Lymph:No Cervical lymphadenopathy present     ENT:  no pallor or cyanosis,  dentition good        Neck:  carotid pulses are full and equal bilaterally, JVP normal, no carotid bruit        Respiratory:  normal breath sounds, clear to auscultation, normal A-P diameter, normal symmetry, normal respiratory excursion, no use of accessory muscles         Cardiac: regular rhythm, normal S1/S2, no S3 or S4, apical impulse not displaced, no murmurs, gallops or rubs                pulses full and equal, no bruits auscultated                                        GI:  abdomen soft, non-tender, BS normoactive, no mass, no HSM, no bruits        Extremities and Muscular Skeletal:  no deformities, clubbing, cyanosis, erythema observed              Neurological:  no gross motor deficits        Psych:  Alert and Oriented x 3        Recent Lab Results:  LIPID RESULTS:  Lab Results   Component Value Date    CHOL 170 11/12/2019    HDL 37 (L) 11/12/2019     (H) 11/12/2019    TRIG 148 11/12/2019    CHOLHDLRATIO 4.2 06/19/2015       LIVER ENZYME RESULTS:  Lab Results   Component Value Date    AST 53 (H) 11/12/2019    ALT 45 11/12/2019       CBC RESULTS:  Lab Results   Component Value Date    WBC 8.1 10/18/2012    RBC 4.35 (L) 10/18/2012    HGB 13.8 10/18/2012    HCT 41.7 10/18/2012    MCV 96 10/18/2012    MCH 31.8 10/18/2012    MCHC 33.2 10/18/2012    RDW 14.2 10/18/2012     10/18/2012       BMP RESULTS:  Lab Results   Component Value Date     11/12/2019    POTASSIUM 3.9 11/12/2019    CHLORIDE 105 11/12/2019    CO2 27 11/12/2019    ANIONGAP 7 11/12/2019     (H) 11/12/2019    BUN 16 11/12/2019    CR 0.96 11/12/2019    GFRESTIMATED >90 11/12/2019    GFRESTBLACK >90 11/12/2019    HARDEEP 8.6 11/12/2019        A1C RESULTS:  No results found for: A1C    INR RESULTS:  No results found for: INR        CC  PATTY Stark CNP  [address]  [place] [address]              Thank you for allowing me to participate in the care of your patient.    Sincerely,     DR GRACIELA DEL CID MD      [facility]

## 2020-07-25 ENCOUNTER — OFFICE VISIT (OUTPATIENT)
Dept: URGENT CARE | Facility: URGENT CARE | Age: 50
End: 2020-07-25
Payer: COMMERCIAL

## 2020-07-25 ENCOUNTER — ANCILLARY PROCEDURE (OUTPATIENT)
Dept: GENERAL RADIOLOGY | Facility: CLINIC | Age: 50
End: 2020-07-25
Attending: PHYSICIAN ASSISTANT
Payer: COMMERCIAL

## 2020-07-25 VITALS
OXYGEN SATURATION: 99 % | RESPIRATION RATE: 20 BRPM | SYSTOLIC BLOOD PRESSURE: 132 MMHG | DIASTOLIC BLOOD PRESSURE: 88 MMHG | WEIGHT: 178.6 LBS | HEART RATE: 69 BPM | BODY MASS INDEX: 29.72 KG/M2 | TEMPERATURE: 97 F

## 2020-07-25 DIAGNOSIS — R07.81 RIB PAIN ON RIGHT SIDE: Primary | ICD-10-CM

## 2020-07-25 DIAGNOSIS — R07.89 CHEST WALL PAIN: ICD-10-CM

## 2020-07-25 PROCEDURE — 71101 X-RAY EXAM UNILAT RIBS/CHEST: CPT | Mod: RT

## 2020-07-25 PROCEDURE — 99214 OFFICE O/P EST MOD 30 MIN: CPT | Performed by: PHYSICIAN ASSISTANT

## 2020-07-25 RX ORDER — HYDROCODONE BITARTRATE AND ACETAMINOPHEN 5; 325 MG/1; MG/1
1 TABLET ORAL EVERY 6 HOURS PRN
Qty: 20 TABLET | Refills: 0 | Status: SHIPPED | OUTPATIENT
Start: 2020-07-25 | End: 2020-07-28

## 2020-07-25 NOTE — PROGRESS NOTES
SUBJECTIVE:  Chief Complaint   Patient presents with     Pain     Rt side rib pain x yesterday. Pt states he hit his side on the couch while playing with his granddaughter.      June Ronquillo is a 50 year old male presents with a chief complaint of right side rib pain from injury.  The injury occurred 1 day(s) ago.   The injury happened while at home. How: leaning over a couch.  The patient complained of moderate pain  and has had decreased ROM.  Pain exacerbated by movement.  Relieved by ice.  He treated it initially with ice. This is the first time this type of injury has occurred to this patient.     Past Medical History:   Diagnosis Date     Angina pectoris 2002     CAD (coronary artery disease)     s/p stent to CFX     Hyperlipidemia LDL goal <100      Hypertension      Allergies   Allergen Reactions     Nkda [No Known Drug Allergies]      Social History     Tobacco Use     Smoking status: Never Smoker     Smokeless tobacco: Never Used   Substance Use Topics     Alcohol use: Yes     Comment: 1-2/mo or less     Family History   Problem Relation Age of Onset     Hypertension Mother      Hyperlipidemia Mother      Heart Disease Paternal Grandmother      Hyperlipidemia Sister      Hyperlipidemia Sister        ROS:  CONSTITUTIONAL:NEGATIVE for fever, chills, change in weight  INTEGUMENTARY/SKIN: NEGATIVE for worrisome rashes, moles or lesions  ENT/MOUTH: NEGATIVE for ear, mouth and throat problems  RESP:NEGATIVE for significant cough or SOB  CV: NEGATIVE for chest pain, palpitations or peripheral edema  GI: NEGATIVE for nausea, abdominal pain, heartburn, or change in bowel habits  : negative for dysuria, hematuria, decreased urinary stream, erectile dysfunction  MUSCULOSKELETAL: POSITIVE  for right side rib pain, tenderness  NEURO: NEGATIVE for weakness, dizziness or paresthesias    EXAM:   /88   Pulse 69   Temp 97  F (36.1  C) (Oral)   Resp 20   Wt 81 kg (178 lb 9.6 oz)   SpO2 99%   BMI 29.72  kg/m    Gen: healthy,alert,no distress  Extremity: DROM of upper extremity.   VASC:There is not compromise to the distal circulation.  Pulses are +2 and CRT is brisk  NECK: supple, non-tender to palpation, FROM   CHEST: clear to auscultation  CV: regular rate and rhythm  EXTREMITIES: peripheral pulses normal  MS:  Positive for right side rib pain with palpation  SKIN: no suspicious lesions or rashes  NEURO: Normal strength and tone, sensory exam grossly normal, mentation intact and speech normal    X-RAY was done and negative for acute changes including fracture Xray read by Werner Rivas PA-C at time of visit    ASSESSMENT/PLAN    ICD-10-CM    1. Rib pain on right side  R07.81 XR Ribs & Chest Right G/E 3 Views     order for DME     HYDROcodone-acetaminophen (NORCO) 5-325 MG tablet   2. Chest wall pain  R07.89 order for DME     HYDROcodone-acetaminophen (NORCO) 5-325 MG tablet       Rib belt for comfort  Ice compresses  vicodin for pain  Return to activity as tolerated.

## 2020-07-25 NOTE — PATIENT INSTRUCTIONS
Patient Education     Rib Contusion or Minor Fracture    A rib contusion is a bruise to one or more rib bones. It may cause pain, tenderness, swelling, and a purplish color to the skin. There may be a sharp pain with each breath. A rib contusion takes anywhere from a few days to a few weeks to heal. A minor rib fracture or break may cause the same symptoms as a rib contusion. The small crack may not be seen on a regular chest X-ray. Treatment for both problems is basically the same.  Home care    You may use over-the-counter pain medicine to control pain, unless another pain medicine was prescribed. If you have chronic liver or kidney disease or ever had a stomach ulcer or GI (gastrointestinal) bleeding, talk with your healthcare provider before using these medicines.    Rest. Don't lift anything heavy or do any activity that causes pain.    Apply an ice pack over the injured area for 15 to 20 minutes every 1 to 2 hours. You should do this for the first 24 to 48 hours. To make an ice pack, put ice cubes in a plastic bag that seals at the top. Wrap the bag in a clean, thin towel or cloth. Never put ice or an ice pack directly on the skin. Continue with ice packs as needed for the relief of pain and swelling.    The first 3 to 4 weeks of healing will be the most painful. If your pain is not under control with the treatment given, call your healthcare provider. Sometimes a stronger pain medicine may be needed. A nerve block can be done in case of severe pain. It will numb the nerve between the ribs.  Follow-up care  Follow up with your healthcare provider, or as advised.  If X-rays were taken, you will be told of any new findings that may affect your care.  Call 911  Call 911 if you have:    Dizziness, weakness or fainting    Shortness of breath with or without chest discomfort    New or worsening pain  When to seek medical advice  Call your healthcare provider right away if any of these occur:    Fever of 100.4 F  (38 C) or higher, or as directed by your healthcare provider    Chills    Stomach pain, vomiting  Date Last Reviewed: 6/1/2018 2000-2019 [employer]. [address] [place] [address], [place] [address]. All rights reserved. This information is not intended as a substitute for professional medical care. Always follow your healthcare professional's instructions.

## 2021-01-12 DIAGNOSIS — I10 HYPERTENSION, UNSPECIFIED TYPE: ICD-10-CM

## 2021-01-12 DIAGNOSIS — E78.5 HYPERLIPIDEMIA LDL GOAL <100: Primary | ICD-10-CM

## 2021-01-12 DIAGNOSIS — I25.10 CORONARY ARTERY DISEASE INVOLVING NATIVE HEART, ANGINA PRESENCE UNSPECIFIED, UNSPECIFIED VESSEL OR LESION TYPE: ICD-10-CM

## 2021-01-12 RX ORDER — HYDROCHLOROTHIAZIDE 25 MG/1
25 TABLET ORAL DAILY
Qty: 90 TABLET | Refills: 0 | Status: SHIPPED | OUTPATIENT
Start: 2021-01-12 | End: 2021-01-25

## 2021-01-13 NOTE — TELEPHONE ENCOUNTER
Pt should see either me or cardiology for follow up, needs fasting lab work     Let me know if needs short term bridge of meds once he makes his appointment     thanks

## 2021-01-13 NOTE — TELEPHONE ENCOUNTER
"Requested Prescriptions   Pending Prescriptions Disp Refills     hydrochlorothiazide (HYDRODIURIL) 25 MG tablet 90 tablet 3     Sig: Take 1 tablet (25 mg) by mouth daily       Diuretics (Including Combos) Protocol Failed - 1/12/2021  9:20 AM        Failed - Recent (12 mo) or future (30 days) visit within the authorizing provider's specialty     Patient has had an office visit with the authorizing provider or a provider within the authorizing providers department within the previous 12 mos or has a future within next 30 days. See \"Patient Info\" tab in inbasket, or \"Choose Columns\" in Meds & Orders section of the refill encounter.              Failed - Normal serum creatinine on file in past 12 months     Recent Labs   Lab Test 11/12/19  0836   CR 0.96              Failed - Normal serum potassium on file in past 12 months     Recent Labs   Lab Test 11/12/19  0836   POTASSIUM 3.9                    Failed - Normal serum sodium on file in past 12 months     Recent Labs   Lab Test 11/12/19  0836                 Passed - Blood pressure under 140/90 in past 12 months     BP Readings from Last 3 Encounters:   07/25/20 132/88   02/07/20 132/88   11/12/19 (!) 144/94                 Passed - Medication is active on med list        Passed - Patient is age 18 or older           RJ Reception Medication is being filled for 1 time refill only due to:  Patient needs to be seen because it has been more than one year since last visit.     Signed Prescriptions:                        Disp   Refills    hydrochlorothiazide (HYDRODIURIL) 25 MG ta*90 tab*0        Sig: Take 1 tablet (25 mg) by mouth daily  Authorizing Provider: DOROTEO RAHMAN  Ordering User: GEORGIA SORTO        "

## 2021-01-14 NOTE — TELEPHONE ENCOUNTER
Selma    Pt set an appointment up to see you on 1/25  He stated cardiology didn't need to see him for a couple months    Labs cued, please add other labs you would like    He will set up a lab only appointment after labs are ordered    Susy Benavides RN   [facility]

## 2021-01-15 ENCOUNTER — HEALTH MAINTENANCE LETTER (OUTPATIENT)
Age: 51
End: 2021-01-15

## 2021-01-15 NOTE — TELEPHONE ENCOUNTER
Yes, he can get his lab work done today or Monday and can go over results when he comes in.     Thanks!  Selma

## 2021-01-20 DIAGNOSIS — I10 HYPERTENSION, UNSPECIFIED TYPE: ICD-10-CM

## 2021-01-20 DIAGNOSIS — E78.5 HYPERLIPIDEMIA LDL GOAL <100: ICD-10-CM

## 2021-01-20 DIAGNOSIS — I25.10 CORONARY ARTERY DISEASE INVOLVING NATIVE HEART, ANGINA PRESENCE UNSPECIFIED, UNSPECIFIED VESSEL OR LESION TYPE: ICD-10-CM

## 2021-01-20 LAB
BASOPHILS # BLD AUTO: 0 10E9/L (ref 0–0.2)
BASOPHILS NFR BLD AUTO: 0.2 %
DIFFERENTIAL METHOD BLD: ABNORMAL
EOSINOPHIL # BLD AUTO: 1 10E9/L (ref 0–0.7)
EOSINOPHIL NFR BLD AUTO: 9.9 %
ERYTHROCYTE [DISTWIDTH] IN BLOOD BY AUTOMATED COUNT: 15.5 % (ref 10–15)
HCT VFR BLD AUTO: 40.8 % (ref 40–53)
HGB BLD-MCNC: 14.1 G/DL (ref 13.3–17.7)
LYMPHOCYTES # BLD AUTO: 3.2 10E9/L (ref 0.8–5.3)
LYMPHOCYTES NFR BLD AUTO: 32.1 %
MCH RBC QN AUTO: 30.9 PG (ref 26.5–33)
MCHC RBC AUTO-ENTMCNC: 34.6 G/DL (ref 31.5–36.5)
MCV RBC AUTO: 89 FL (ref 78–100)
MONOCYTES # BLD AUTO: 0.9 10E9/L (ref 0–1.3)
MONOCYTES NFR BLD AUTO: 9.3 %
NEUTROPHILS # BLD AUTO: 4.9 10E9/L (ref 1.6–8.3)
NEUTROPHILS NFR BLD AUTO: 48.5 %
PLATELET # BLD AUTO: 387 10E9/L (ref 150–450)
RBC # BLD AUTO: 4.57 10E12/L (ref 4.4–5.9)
WBC # BLD AUTO: 10.1 10E9/L (ref 4–11)

## 2021-01-20 PROCEDURE — 36415 COLL VENOUS BLD VENIPUNCTURE: CPT | Performed by: NURSE PRACTITIONER

## 2021-01-20 PROCEDURE — 82306 VITAMIN D 25 HYDROXY: CPT | Performed by: NURSE PRACTITIONER

## 2021-01-20 PROCEDURE — 80061 LIPID PANEL: CPT | Performed by: NURSE PRACTITIONER

## 2021-01-20 PROCEDURE — 84439 ASSAY OF FREE THYROXINE: CPT | Performed by: NURSE PRACTITIONER

## 2021-01-20 PROCEDURE — 80050 GENERAL HEALTH PANEL: CPT | Performed by: NURSE PRACTITIONER

## 2021-01-21 LAB — DEPRECATED CALCIDIOL+CALCIFEROL SERPL-MC: 32 UG/L (ref 20–75)

## 2021-01-22 LAB
ALBUMIN SERPL-MCNC: 4.2 G/DL (ref 3.4–5)
ALP SERPL-CCNC: 65 U/L (ref 40–150)
ALT SERPL W P-5'-P-CCNC: 79 U/L (ref 0–70)
ANION GAP SERPL CALCULATED.3IONS-SCNC: 9 MMOL/L (ref 3–14)
AST SERPL W P-5'-P-CCNC: 103 U/L (ref 0–45)
BILIRUB SERPL-MCNC: 0.4 MG/DL (ref 0.2–1.3)
BUN SERPL-MCNC: 16 MG/DL (ref 7–30)
CALCIUM SERPL-MCNC: 9.6 MG/DL (ref 8.5–10.1)
CHLORIDE SERPL-SCNC: 106 MMOL/L (ref 94–109)
CHOLEST SERPL-MCNC: 154 MG/DL
CO2 SERPL-SCNC: 24 MMOL/L (ref 20–32)
CREAT SERPL-MCNC: 0.94 MG/DL (ref 0.66–1.25)
GFR SERPL CREATININE-BSD FRML MDRD: >90 ML/MIN/{1.73_M2}
GLUCOSE SERPL-MCNC: 116 MG/DL (ref 70–99)
HDLC SERPL-MCNC: 41 MG/DL
LDLC SERPL CALC-MCNC: 65 MG/DL
NONHDLC SERPL-MCNC: 113 MG/DL
POTASSIUM SERPL-SCNC: 3.9 MMOL/L (ref 3.4–5.3)
PROT SERPL-MCNC: 7.2 G/DL (ref 6.8–8.8)
SODIUM SERPL-SCNC: 139 MMOL/L (ref 133–144)
T4 FREE SERPL-MCNC: 0.75 NG/DL (ref 0.76–1.46)
TRIGL SERPL-MCNC: 241 MG/DL
TSH SERPL DL<=0.005 MIU/L-ACNC: 6.28 MU/L (ref 0.4–4)

## 2021-01-25 ENCOUNTER — OFFICE VISIT (OUTPATIENT)
Dept: FAMILY MEDICINE | Facility: CLINIC | Age: 51
End: 2021-01-25
Payer: COMMERCIAL

## 2021-01-25 VITALS
DIASTOLIC BLOOD PRESSURE: 86 MMHG | WEIGHT: 180 LBS | TEMPERATURE: 97.8 F | SYSTOLIC BLOOD PRESSURE: 136 MMHG | HEART RATE: 83 BPM | BODY MASS INDEX: 29.99 KG/M2 | OXYGEN SATURATION: 99 % | HEIGHT: 65 IN

## 2021-01-25 DIAGNOSIS — E66.3 OVERWEIGHT (BMI 25.0-29.9): ICD-10-CM

## 2021-01-25 DIAGNOSIS — R74.8 INCREASED LIVER ENZYMES: Primary | ICD-10-CM

## 2021-01-25 DIAGNOSIS — R79.89 INCREASED THYROID STIMULATING HORMONE (TSH) LEVEL: ICD-10-CM

## 2021-01-25 DIAGNOSIS — R76.8 HEPATITIS A ANTIBODY POSITIVE: ICD-10-CM

## 2021-01-25 DIAGNOSIS — E06.3 HYPOTHYROIDISM DUE TO HASHIMOTO'S THYROIDITIS: ICD-10-CM

## 2021-01-25 DIAGNOSIS — E78.5 HYPERLIPIDEMIA LDL GOAL <100: ICD-10-CM

## 2021-01-25 DIAGNOSIS — R73.03 PRE-DIABETES: ICD-10-CM

## 2021-01-25 DIAGNOSIS — R74.8 INCREASED LIVER ENZYMES: ICD-10-CM

## 2021-01-25 DIAGNOSIS — I10 HYPERTENSION, UNSPECIFIED TYPE: ICD-10-CM

## 2021-01-25 DIAGNOSIS — I25.10 CORONARY ARTERY DISEASE INVOLVING NATIVE HEART, ANGINA PRESENCE UNSPECIFIED, UNSPECIFIED VESSEL OR LESION TYPE: ICD-10-CM

## 2021-01-25 LAB — HBA1C MFR BLD: 6 % (ref 0–5.6)

## 2021-01-25 PROCEDURE — 99214 OFFICE O/P EST MOD 30 MIN: CPT | Performed by: NURSE PRACTITIONER

## 2021-01-25 PROCEDURE — 84439 ASSAY OF FREE THYROXINE: CPT | Performed by: NURSE PRACTITIONER

## 2021-01-25 PROCEDURE — 87340 HEPATITIS B SURFACE AG IA: CPT | Performed by: NURSE PRACTITIONER

## 2021-01-25 PROCEDURE — 86706 HEP B SURFACE ANTIBODY: CPT | Performed by: NURSE PRACTITIONER

## 2021-01-25 PROCEDURE — 80076 HEPATIC FUNCTION PANEL: CPT | Performed by: NURSE PRACTITIONER

## 2021-01-25 PROCEDURE — 36415 COLL VENOUS BLD VENIPUNCTURE: CPT | Performed by: NURSE PRACTITIONER

## 2021-01-25 PROCEDURE — 82306 VITAMIN D 25 HYDROXY: CPT | Performed by: NURSE PRACTITIONER

## 2021-01-25 PROCEDURE — 84443 ASSAY THYROID STIM HORMONE: CPT | Performed by: NURSE PRACTITIONER

## 2021-01-25 PROCEDURE — 83036 HEMOGLOBIN GLYCOSYLATED A1C: CPT | Performed by: NURSE PRACTITIONER

## 2021-01-25 PROCEDURE — 86709 HEPATITIS A IGM ANTIBODY: CPT | Performed by: NURSE PRACTITIONER

## 2021-01-25 PROCEDURE — 86800 THYROGLOBULIN ANTIBODY: CPT | Performed by: NURSE PRACTITIONER

## 2021-01-25 PROCEDURE — 86708 HEPATITIS A ANTIBODY: CPT | Performed by: NURSE PRACTITIONER

## 2021-01-25 PROCEDURE — 86803 HEPATITIS C AB TEST: CPT | Performed by: NURSE PRACTITIONER

## 2021-01-25 RX ORDER — HYDROCHLOROTHIAZIDE 25 MG/1
25 TABLET ORAL DAILY
Qty: 90 TABLET | Refills: 0 | Status: SHIPPED | OUTPATIENT
Start: 2021-01-25 | End: 2021-05-12

## 2021-01-25 ASSESSMENT — MIFFLIN-ST. JEOR: SCORE: 1593.35

## 2021-01-25 NOTE — PROGRESS NOTES
"  Assessment & Plan       ICD-10-CM    1. Increased liver enzymes  R74.8 **Hepatitis C Screen Reflex to RNA FUTURE anytime     Hepatitis B surface antigen     Hepatitis A Antibody IgG     Hepatitis B Surface Antibody     Hepatic panel     **A1C FUTURE anytime     Hepatitis A antibody IgM   2. Increased thyroid stimulating hormone (TSH) level  R79.89 TSH with free T4 reflex     Vitamin D Deficiency     **A1C FUTURE anytime     Anti thyroglobulin antibody   3. Hypertension, unspecified type  I10 hydrochlorothiazide (HYDRODIURIL) 25 MG tablet   4. Pre-diabetes  R73.03    5. Hyperlipidemia LDL goal <100  E78.5    6. Coronary artery disease involving native heart, angina presence unspecified, unspecified vessel or lesion type  I25.10    7. Overweight (BMI 25.0-29.9)  E66.3    8. Hepatitis A antibody positive  R76.8 Hepatitis A antibody IgM   9. Hypothyroidism due to Hashimoto's thyroiditis  E03.8 levothyroxine (SYNTHROID/LEVOTHROID) 25 MCG tablet    E06.3     pt stopped crestor and not recommended for CAD and cardiac risks, he does not feel comfortable starting this until consults with Cardiology, feels his labs are okay without this and wants to either lower or go off it. Recommended he see Cardiology asap to discuss and continue daily ASA and fish oil. 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    Weight as of this encounter: 81.6 kg (180 lb).   Weight management plan: Discussed healthy diet and exercise guidelines      Work on weight loss  Regular exercise  See Patient Instructions    No " follow-ups on file.    PATTY Stark CNP  [facility] JOSE MARTIN Watkins is a 50 year old who presents to clinic today for the following health issues     HPI       Medication Followup of     Taking Medication as prescribed: yes    Side Effects:  Yes    Medication Helping Symptoms:  Unknown     Hypertension Follow-up      Do you check your blood pressure regularly outside of the clinic? No     Are you following a low salt diet? Yes    Are your blood pressures ever more than 140 on the top number (systolic) OR more   than 90 on the bottom number (diastolic), for example 140/90? Yes    Vascular Disease Follow-up      How often do you take nitroglycerin? Never    Do you take an aspirin every day? Yes  Saw Card last 2/2020  History- acute inferoposterior STEMI in 2004.  He was admitted to [facility]. The culprit 90% mid circumflex stenosis was successfully revascularized with placement of a drug-eluting stent.  He had normal left ventricular systolic function.  He has been well since then with no repeat revascularizations without recurrence .     Hypothyroidism Follow-up      Since last visit, patient describes the following symptoms: Weight stable, no hair loss, no skin changes, no constipation, no loose stools and fatigue      How many servings of fruits and vegetables do you eat daily?  2-3    On average, how many sweetened beverages do you drink each day (Examples: soda, juice, sweet tea, etc.  Do NOT count diet or artificially sweetened beverages)?   0    How many days per week do you exercise enough to make your heart beat faster? 5    How many minutes a day do you exercise enough to make your heart beat faster? 30 - 60    How many days per week do you miss taking your medication? 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      Objective    /86   Pulse 83   Temp 97.8  F (36.6  C) (Oral)   Ht 1.651 m (5' 5\")   Wt 81.6 kg (180 lb)   SpO2 99%   BMI 29.95 kg/m    Body mass index is 29.95 kg/m .  Physical Exam   GENERAL: healthy, alert and no distress  NECK: no adenopathy, no asymmetry, masses, or scars and thyroid normal to palpation  RESP: lungs clear to auscultation - no rales, rhonchi or wheezes  CV: regular rate and rhythm, normal S1 S2, no S3 or S4, no murmur, click or rub, no peripheral edema and peripheral pulses strong  ABDOMEN: soft, nontender, no hepatosplenomegaly, no masses and bowel sounds normal limited by obesity   MS: no gross musculoskeletal defects noted, no edema  SKIN: no suspicious lesions or rashes  NEURO: Normal strength and tone, mentation intact and speech normal  PSYCH: mentation appears normal, affect normal/bright    No results found for this or any previous visit (from the past 24 hour(s)).            "

## 2021-01-26 LAB
ALBUMIN SERPL-MCNC: 4.2 G/DL (ref 3.4–5)
ALP SERPL-CCNC: 60 U/L (ref 40–150)
ALT SERPL W P-5'-P-CCNC: 74 U/L (ref 0–70)
AST SERPL W P-5'-P-CCNC: 94 U/L (ref 0–45)
BILIRUB DIRECT SERPL-MCNC: 0.1 MG/DL (ref 0–0.2)
BILIRUB SERPL-MCNC: 0.4 MG/DL (ref 0.2–1.3)
DEPRECATED CALCIDIOL+CALCIFEROL SERPL-MC: 29 UG/L (ref 20–75)
HAV IGG SER QL IA: REACTIVE
HBV SURFACE AB SERPL IA-ACNC: 222.2 M[IU]/ML
HBV SURFACE AG SERPL QL IA: NONREACTIVE
HCV AB SERPL QL IA: NONREACTIVE
PROT SERPL-MCNC: 7.2 G/DL (ref 6.8–8.8)
T4 FREE SERPL-MCNC: 0.72 NG/DL (ref 0.76–1.46)
TSH SERPL DL<=0.005 MIU/L-ACNC: 6.13 MU/L (ref 0.4–4)

## 2021-01-27 PROBLEM — E06.3 HYPOTHYROIDISM DUE TO HASHIMOTO'S THYROIDITIS: Status: ACTIVE | Noted: 2021-01-27

## 2021-01-27 PROBLEM — R76.8 HEPATITIS A ANTIBODY POSITIVE: Status: ACTIVE | Noted: 2021-01-27

## 2021-01-27 LAB — THYROGLOB AB SERPL IA-ACNC: 316 IU/ML (ref 0–40)

## 2021-01-27 RX ORDER — LEVOTHYROXINE SODIUM 25 UG/1
25 TABLET ORAL DAILY
Qty: 90 TABLET | Refills: 0 | Status: SHIPPED | OUTPATIENT
Start: 2021-01-27 | End: 2021-05-12

## 2021-01-27 NOTE — PATIENT INSTRUCTIONS
Patient Education     Hypothyroidism    You have hypothyroidism. This means your thyroid gland is not making enough thyroid hormone. This hormone is vital to body growth and metabolism. If you don t make enough, many body processes slow down. This can cause symptoms throughout the body. Hypothyroidism can range from mild to severe. The most severe form is called myxedema.  There are a number of causes of hypothyroidism. A common cause is Hashimoto s disease. This disease causes the body s own immune system to attack the thyroid gland. When you have certain treatments, such as surgery to remove the thyroid gland, this can also cause hypothyroidism. Sometimes the thyroid gland is not functioning because of lack of stimulation from the pituitary gland.  Symptoms of hypothyroidism can include:    Fatigue    Trouble concentrating or thinking clearly; forgetfulness    Dry skin    Hair loss    Weight gain    Low tolerance to cold    Constipation    Depression    Personality changes    Tingling or prickling of the hands or feet    Heavy, absent, or irregular periods (women only)  Older adults may sometimes have other symptoms. These can include:    Muscle aches and weakness    Confusion    Incontinence (unable to control urine or stool)    Trouble moving around    Falling  Treatment for hypothyroidism involves taking thyroid hormone pills daily. These pills replace the hormone your thyroid doesn t make. You will likely need to take a daily pill for the rest of your life. Tips for taking this medicine are given below.  Home care  Tips for taking your medicine    Take your thyroid hormone pills as prescribed by your healthcare provider. This is most often 1 pill a day on an empty stomach. Use a pillbox labeled with the days of the week. This will help you remember to take your pill each day.    Don t take products that contain iron and calcium or antacids within 4 hours of taking your thyroid hormone pills.    Don t take  other medicines with your thyroid hormone pill without checking with your provider first.    Tell your provider if you have any side effects from your medicines that bother you, especially any chest pain or irregular heartbeats.    Never change the dosage or stop taking your thyroid pills without talking to your provider first.  General care    Always talk with your provider before trying other medicines or treatments for your thyroid problem.    If you see other healthcare providers, be sure to let them know about your thyroid problem.    Let your healthcare provider know if you become pregnant because your dose of thyroid hormone will need to be adjusted.  Follow-up care  See your healthcare provider for checkups as advised. You may need regular tests to check the level of thyroid hormone in your blood.  When to seek medical advice  Call your healthcare provider right away if any of these occur:    New symptoms develop    Symptoms return, continue, or worsen even after treatment    Extreme fatigue    Puffy hands, face, or feet    Fast or irregular heartbeat    Confusion  Call 911  Call 911 if any of these occur:    Fainting    Chest pain    Shortness of breath or trouble breathing  [employer] last reviewed this educational content on 4/1/2018 2000-2020 [employer]. [address] [place] [address]. All rights reserved. This information is not intended as a substitute for professional medical care. Always follow your healthcare professional's instructions.

## 2021-01-28 LAB — HAV IGM SERPL QL IA: NONREACTIVE

## 2021-02-26 ENCOUNTER — OFFICE VISIT (OUTPATIENT)
Dept: CARDIOLOGY | Facility: CLINIC | Age: 51
End: 2021-02-26
Payer: COMMERCIAL

## 2021-02-26 VITALS
HEART RATE: 82 BPM | BODY MASS INDEX: 29.99 KG/M2 | DIASTOLIC BLOOD PRESSURE: 88 MMHG | SYSTOLIC BLOOD PRESSURE: 169 MMHG | WEIGHT: 180 LBS | HEIGHT: 65 IN

## 2021-02-26 DIAGNOSIS — I25.10 CORONARY ARTERY DISEASE INVOLVING NATIVE CORONARY ARTERY OF NATIVE HEART WITHOUT ANGINA PECTORIS: Primary | ICD-10-CM

## 2021-02-26 PROCEDURE — 99214 OFFICE O/P EST MOD 30 MIN: CPT | Performed by: INTERNAL MEDICINE

## 2021-02-26 RX ORDER — LOSARTAN POTASSIUM 50 MG/1
50 TABLET ORAL DAILY
Qty: 30 TABLET | Refills: 3 | Status: SHIPPED | OUTPATIENT
Start: 2021-02-26 | End: 2021-07-02

## 2021-02-26 ASSESSMENT — MIFFLIN-ST. JEOR: SCORE: 1593.35

## 2021-02-26 NOTE — PROGRESS NOTES
HPI and Plan:   See dictation    Orders Placed This Encounter   Procedures     Basic metabolic panel     Follow-Up with Cardiologist     Follow-Up with Cardiologist       Orders Placed This Encounter   Medications     losartan (COZAAR) 50 MG tablet     Sig: Take 1 tablet (50 mg) by mouth daily     Dispense:  30 tablet     Refill:  3       Encounter Diagnosis   Name Primary?     Coronary artery disease involving native coronary artery of native heart without angina pectoris Yes       CURRENT MEDICATIONS:  Current Outpatient Medications   Medication Sig Dispense Refill     ASPIRIN 81 MG OR TABS 1 TABLET DAILY       FISH OIL 1000 MG OR CAPS 1 bid       hydrochlorothiazide (HYDRODIURIL) 25 MG tablet Take 1 tablet (25 mg) by mouth daily 90 tablet 0     levothyroxine (SYNTHROID/LEVOTHROID) 25 MCG tablet Take 1 tablet (25 mcg) by mouth daily 90 tablet 0     losartan (COZAAR) 50 MG tablet Take 1 tablet (50 mg) by mouth daily 30 tablet 3     rosuvastatin (CRESTOR) 40 MG tablet Take 1 tablet (40 mg) by mouth daily 90 tablet 0     order for DME Rib belt (Patient not taking: Reported on 2/26/2021) 1 Device 0       ALLERGIES     Allergies   Allergen Reactions     Nkda [No Known Drug Allergies]        PAST MEDICAL HISTORY:  Past Medical History:   Diagnosis Date     Angina pectoris 2002     CAD (coronary artery disease)     s/p stent to CFX     Hyperlipidemia LDL goal <100      Hypertension        PAST SURGICAL HISTORY:  Past Surgical History:   Procedure Laterality Date     HEART CATH PTCA SINGLE VESSEL  10/10/2004    Stent to CFX - [facility]        FAMILY HISTORY:  Family History   Problem Relation Age of Onset     Hypertension Mother      Hyperlipidemia Mother      Heart Disease Paternal Grandmother      Hyperlipidemia Sister      Hyperlipidemia Sister        SOCIAL HISTORY:  Social History     Socioeconomic History     Marital status:      Spouse name: None     Number of children: None     Years of education:  None     Highest education level: None   Occupational History     None   Social Needs     Financial resource strain: None     Food insecurity     Worry: None     Inability: None     Transportation needs     Medical: None     Non-medical: None   Tobacco Use     Smoking status: Never Smoker     Smokeless tobacco: Never Used   Substance and Sexual Activity     Alcohol use: Yes     Comment: 1-2/mo or less     Drug use: No     Sexual activity: Yes     Partners: Female   Lifestyle     Physical activity     Days per week: None     Minutes per session: None     Stress: None   Relationships     Social connections     Talks on phone: None     Gets together: None     Attends [employer] service: None     Active member of club or organization: None     Attends meetings of clubs or organizations: None     Relationship status: None     Intimate partner violence     Fear of current or ex partner: None     Emotionally abused: None     Physically abused: None     Forced sexual activity: None   Other Topics Concern     Parent/sibling w/ CABG, MI or angioplasty before 65F 55M? No      Service Not Asked     Blood Transfusions Not Asked     Caffeine Concern No     Occupational Exposure Not Asked     Hobby Hazards Not Asked     Sleep Concern Not Asked     Stress Concern Not Asked     Weight Concern Not Asked     Special Diet No     Back Care Not Asked     Exercise No     Bike Helmet Not Asked     Seat Belt Yes     Self-Exams Not Asked   Social History Narrative     None       Review of Systems:  Skin:  Negative     Eyes:  Positive for contacts  ENT:  Negative    Respiratory:  Negative for    Cardiovascular:  Negative    Gastroenterology: Negative    Genitourinary:  Negative    Musculoskeletal:  Negative    Neurologic:  Positive for headaches  Psychiatric:  Negative    Heme/Lymph/Imm:  Negative for    Endocrine:  Negative      Physical Exam:  Vitals: BP (!) 169/88 (BP Location: Left arm, Cuff Size: Adult Regular)   Pulse 82   Ht  "1.651 m (5' 5\")   Wt 81.6 kg (180 lb)   BMI 29.95 kg/m      Constitutional:  cooperative, alert and oriented, well developed, well nourished, in no acute distress        Skin:  warm and dry to the touch, no apparent skin lesions or masses noted          Head:  normocephalic, no masses or lesions        Eyes:  pupils equal and round, conjunctivae and lids unremarkable, sclera white, no xanthalasma, EOMS intact, no nystagmus        Lymph:No Cervical lymphadenopathy present     ENT:  no pallor or cyanosis, dentition good        Neck:  carotid pulses are full and equal bilaterally, JVP normal, no carotid bruit        Respiratory:  normal breath sounds, clear to auscultation, normal A-P diameter, normal symmetry, normal respiratory excursion, no use of accessory muscles         Cardiac: regular rhythm, normal S1/S2, no S3 or S4, apical impulse not displaced, no murmurs, gallops or rubs                pulses full and equal, no bruits auscultated                                        GI:  abdomen soft, non-tender, BS normoactive, no mass, no HSM, no bruits        Extremities and Muscular Skeletal:  no deformities, clubbing, cyanosis, erythema observed              Neurological:  no gross motor deficits        Psych:  Alert and Oriented x 3        Recent Lab Results:  LIPID RESULTS:  Lab Results   Component Value Date    CHOL 154 01/20/2021    HDL 41 01/20/2021    LDL 65 01/20/2021    TRIG 241 (H) 01/20/2021    CHOLHDLRATIO 4.2 06/19/2015       LIVER ENZYME RESULTS:  Lab Results   Component Value Date    AST 94 (H) 01/25/2021    ALT 74 (H) 01/25/2021       CBC RESULTS:  Lab Results   Component Value Date    WBC 10.1 01/20/2021    RBC 4.57 01/20/2021    HGB 14.1 01/20/2021    HCT 40.8 01/20/2021    MCV 89 01/20/2021    MCH 30.9 01/20/2021    MCHC 34.6 01/20/2021    RDW 15.5 (H) 01/20/2021     01/20/2021       BMP RESULTS:  Lab Results   Component Value Date     01/20/2021    POTASSIUM 3.9 01/20/2021    " CHLORIDE 106 01/20/2021    CO2 24 01/20/2021    ANIONGAP 9 01/20/2021     (H) 01/20/2021    BUN 16 01/20/2021    CR 0.94 01/20/2021    GFRESTIMATED >90 01/20/2021    GFRESTBLACK >90 01/20/2021    HARDEEP 9.6 01/20/2021        A1C RESULTS:  Lab Results   Component Value Date    A1C 6.0 (H) 01/25/2021       INR RESULTS:  No results found for: INR        CC  No referring provider defined for this encounter.

## 2021-02-26 NOTE — PROGRESS NOTES
Service Date: 02/26/2021      HISTORY OF PRESENT ILLNESS:  It is a pleasure for me to see Mr. Ronquillo for followup of coronary artery disease.      In 2004, he presented to [facility] with an acute inferoposterior STEMI.  The culprit was a 90% mid-circumflex stenosis, which was successfully revascularized with placement of a drug-eluting stent.  He has normal left ventricular systolic function.      This gentleman lives a very busy life.  He used to travel a lot, but since the COVID pandemic started, his traveling has come to a half.  He exercises on a regular basis by running at least half an hour on the treadmill.  He has no symptoms of angina or heart failure.      When I saw him last year, his blood pressure was slowly increasing.  He is just on hydrochlorothiazide.  In the past year, he has monitored his blood pressure and he tells me the systolics have varied between 120 to the 150s.  Today he had a blood pressure of 169/88 when he first arrived.  This may be because he had to rush.  When I rechecked his blood pressure, it was 132/80.  Nonetheless, with a blood pressure of 170 systolic, I think we should start him on another medication for blood pressure.  He does say that lisinopril was not well tolerated previously.  I would like to try him on losartan 50 mg once daily.  Possible side effects were discussed.  His basic metabolic panel will be rechecked again in about a month's time.      He has recently been diagnosed with mild hypothyroidism.  At the same time, his liver function tests were drawn.  He has an ALT of 74, which is just above normal limits.  AST is 94, which is around twice the upper limits of normal.  Alkaline phosphatase and bilirubin within normal limits.      With liver function tests as they are, there should be no problem with him continuing with the current dose of Crestor.  I do wonder if his abnormal liver function tests may be related to his hypothyroidism, but in any case, the liver  function tests are not sufficiently deranged enough for me to recommend stopping the Crestor.      PHYSICAL EXAMINATION:  Cardiac examination is unremarkable.      I do see an HbA1C of 6.0.  He has impaired glucose tolerance.  I explained what this means to him.  He would definitely be more diligent with a low-fat, low-carbohydrate, low-salt diet.      I am happy to see that his liver profile is pretty good with an LDL of 65 and an HDL of 61.      I look forward to seeing him again in a year's time.         GRACIELA DEL CID MD, [facility]             D: 2021   T: 2021   MT: BHAVANA      Name:     LÁZARO CEDILLO   MRN:      [id number]        Account:      [id number]   :      1970           Service Date: 2021      Document: [id number]

## 2021-02-26 NOTE — LETTER
2/26/2021    Selma Johnson, APRN CNP  [address]  [facility] [address]    RE: June Ronquillo       Dear Colleague,    I had the pleasure of seeing June Ronquillo in the [facility].    HPI and Plan:   See dictation    Orders Placed This Encounter   Procedures     Basic metabolic panel     Follow-Up with Cardiologist     Follow-Up with Cardiologist       Orders Placed This Encounter   Medications     losartan (COZAAR) 50 MG tablet     Sig: Take 1 tablet (50 mg) by mouth daily     Dispense:  30 tablet     Refill:  3       Encounter Diagnosis   Name Primary?     Coronary artery disease involving native coronary artery of native heart without angina pectoris Yes       CURRENT MEDICATIONS:  Current Outpatient Medications   Medication Sig Dispense Refill     ASPIRIN 81 MG OR TABS 1 TABLET DAILY       FISH OIL 1000 MG OR CAPS 1 bid       hydrochlorothiazide (HYDRODIURIL) 25 MG tablet Take 1 tablet (25 mg) by mouth daily 90 tablet 0     levothyroxine (SYNTHROID/LEVOTHROID) 25 MCG tablet Take 1 tablet (25 mcg) by mouth daily 90 tablet 0     losartan (COZAAR) 50 MG tablet Take 1 tablet (50 mg) by mouth daily 30 tablet 3     rosuvastatin (CRESTOR) 40 MG tablet Take 1 tablet (40 mg) by mouth daily 90 tablet 0     order for DME Rib belt (Patient not taking: Reported on 2/26/2021) 1 Device 0       ALLERGIES     Allergies   Allergen Reactions     Nkda [No Known Drug Allergies]        PAST MEDICAL HISTORY:  Past Medical History:   Diagnosis Date     Angina pectoris 2002     CAD (coronary artery disease)     s/p stent to CFX     Hyperlipidemia LDL goal <100      Hypertension        PAST SURGICAL HISTORY:  Past Surgical History:   Procedure Laterality Date     HEART CATH PTCA SINGLE VESSEL  10/10/2004    Stent to CFX - [facility]        FAMILY HISTORY:  Family History   Problem Relation Age of Onset     Hypertension Mother      Hyperlipidemia  Mother      Heart Disease Paternal Grandmother      Hyperlipidemia Sister      Hyperlipidemia Sister        SOCIAL HISTORY:  Social History     Socioeconomic History     Marital status:      Spouse name: None     Number of children: None     Years of education: None     Highest education level: None   Occupational History     None   Social Needs     Financial resource strain: None     Food insecurity     Worry: None     Inability: None     Transportation needs     Medical: None     Non-medical: None   Tobacco Use     Smoking status: Never Smoker     Smokeless tobacco: Never Used   Substance and Sexual Activity     Alcohol use: Yes     Comment: 1-2/mo or less     Drug use: No     Sexual activity: Yes     Partners: Female   Lifestyle     Physical activity     Days per week: None     Minutes per session: None     Stress: None   Relationships     Social connections     Talks on phone: None     Gets together: None     Attends Sikh service: None     Active member of club or organization: None     Attends meetings of clubs or organizations: None     Relationship status: None     Intimate partner violence     Fear of current or ex partner: None     Emotionally abused: None     Physically abused: None     Forced sexual activity: None   Other Topics Concern     Parent/sibling w/ CABG, MI or angioplasty before 65F 55M? No      Service Not Asked     Blood Transfusions Not Asked     Caffeine Concern No     Occupational Exposure Not Asked     Hobby Hazards Not Asked     Sleep Concern Not Asked     Stress Concern Not Asked     Weight Concern Not Asked     Special Diet No     Back Care Not Asked     Exercise No     Bike Helmet Not Asked     Seat Belt Yes     Self-Exams Not Asked   Social History Narrative     None       Review of Systems:  Skin:  Negative     Eyes:  Positive for contacts  ENT:  Negative    Respiratory:  Negative for    Cardiovascular:  Negative    Gastroenterology: Negative    Genitourinary:   "Negative    Musculoskeletal:  Negative    Neurologic:  Positive for headaches  Psychiatric:  Negative    Heme/Lymph/Imm:  Negative for    Endocrine:  Negative      Physical Exam:  Vitals: BP (!) 169/88 (BP Location: Left arm, Cuff Size: Adult Regular)   Pulse 82   Ht 1.651 m (5' 5\")   Wt 81.6 kg (180 lb)   BMI 29.95 kg/m      Constitutional:  cooperative, alert and oriented, well developed, well nourished, in no acute distress        Skin:  warm and dry to the touch, no apparent skin lesions or masses noted          Head:  normocephalic, no masses or lesions        Eyes:  pupils equal and round, conjunctivae and lids unremarkable, sclera white, no xanthalasma, EOMS intact, no nystagmus        Lymph:No Cervical lymphadenopathy present     ENT:  no pallor or cyanosis, dentition good        Neck:  carotid pulses are full and equal bilaterally, JVP normal, no carotid bruit        Respiratory:  normal breath sounds, clear to auscultation, normal A-P diameter, normal symmetry, normal respiratory excursion, no use of accessory muscles         Cardiac: regular rhythm, normal S1/S2, no S3 or S4, apical impulse not displaced, no murmurs, gallops or rubs                pulses full and equal, no bruits auscultated                                        GI:  abdomen soft, non-tender, BS normoactive, no mass, no HSM, no bruits        Extremities and Muscular Skeletal:  no deformities, clubbing, cyanosis, erythema observed              Neurological:  no gross motor deficits        Psych:  Alert and Oriented x 3        Recent Lab Results:  LIPID RESULTS:  Lab Results   Component Value Date    CHOL 154 01/20/2021    HDL 41 01/20/2021    LDL 65 01/20/2021    TRIG 241 (H) 01/20/2021    CHOLHDLRATIO 4.2 06/19/2015       LIVER ENZYME RESULTS:  Lab Results   Component Value Date    AST 94 (H) 01/25/2021    ALT 74 (H) 01/25/2021       CBC RESULTS:  Lab Results   Component Value Date    WBC 10.1 01/20/2021    RBC 4.57 01/20/2021    HGB " 14.1 01/20/2021    HCT 40.8 01/20/2021    MCV 89 01/20/2021    MCH 30.9 01/20/2021    MCHC 34.6 01/20/2021    RDW 15.5 (H) 01/20/2021     01/20/2021       BMP RESULTS:  Lab Results   Component Value Date     01/20/2021    POTASSIUM 3.9 01/20/2021    CHLORIDE 106 01/20/2021    CO2 24 01/20/2021    ANIONGAP 9 01/20/2021     (H) 01/20/2021    BUN 16 01/20/2021    CR 0.94 01/20/2021    GFRESTIMATED >90 01/20/2021    GFRESTBLACK >90 01/20/2021    HARDEEP 9.6 01/20/2021        A1C RESULTS:  Lab Results   Component Value Date    A1C 6.0 (H) 01/25/2021       INR RESULTS:  No results found for: INR      Thank you for allowing me to participate in the care of your patient.      Sincerely,     DR GRACIELA DEL CID MD     [facility]  cc:   No referring provider defined for this encounter.

## 2021-03-30 DIAGNOSIS — E78.5 HYPERLIPIDEMIA LDL GOAL <100: ICD-10-CM

## 2021-03-30 DIAGNOSIS — I25.10 CVD (CARDIOVASCULAR DISEASE): ICD-10-CM

## 2021-03-31 RX ORDER — ROSUVASTATIN CALCIUM 40 MG/1
TABLET, COATED ORAL
Qty: 90 TABLET | Refills: 1 | Status: SHIPPED | OUTPATIENT
Start: 2021-03-31 | End: 2021-10-04

## 2021-03-31 NOTE — TELEPHONE ENCOUNTER
"Requested Prescriptions   Pending Prescriptions Disp Refills     rosuvastatin (CRESTOR) 40 MG tablet [Pharmacy Med Name: ROSUVASTATIN CALCIUM 40MG TABS] 90 tablet 1     Sig: TAKE ONE TABLET BY MOUTH ONCE DAILY. MUST MAKE APPOINTMENT FOR ANNUAL FOLLOW UP FOR FURTHER REFILLS       Statins Protocol Passed - 3/30/2021  3:18 PM        Passed - LDL on file in past 12 months     Recent Labs   Lab Test 01/20/21  1528   LDL 65             Passed - No abnormal creatine kinase in past 12 months     No lab results found.             Passed - Recent (12 mo) or future (30 days) visit within the authorizing provider's specialty     Patient has had an office visit with the authorizing provider or a provider within the authorizing providers department within the previous 12 mos or has a future within next 30 days. See \"Patient Info\" tab in inbasket, or \"Choose Columns\" in Meds & Orders section of the refill encounter.              Passed - Medication is active on med list        Passed - Patient is age 18 or older           Bibi Chadwick RN  [facility]    "

## 2021-04-06 DIAGNOSIS — Z53.9 ERRONEOUS ENCOUNTER--DISREGARD: Primary | ICD-10-CM

## 2021-04-28 DIAGNOSIS — E06.3 HYPOTHYROIDISM DUE TO HASHIMOTO'S THYROIDITIS: ICD-10-CM

## 2021-04-28 NOTE — TELEPHONE ENCOUNTER
Routing refill request to provider for review/approval because:  Labs out of range:  TSH    Susy Benavides RN   [facility]

## 2021-04-30 ENCOUNTER — HOSPITAL ENCOUNTER (OUTPATIENT)
Dept: CT IMAGING | Facility: CLINIC | Age: 51
Discharge: HOME | End: 2021-04-30
Attending: NURSE PRACTITIONER | Admitting: NURSE PRACTITIONER
Payer: COMMERCIAL

## 2021-04-30 ENCOUNTER — ANCILLARY PROCEDURE (OUTPATIENT)
Dept: GENERAL RADIOLOGY | Facility: CLINIC | Age: 51
End: 2021-04-30
Attending: PHYSICIAN ASSISTANT
Payer: COMMERCIAL

## 2021-04-30 ENCOUNTER — OFFICE VISIT (OUTPATIENT)
Dept: PEDIATRICS | Facility: CLINIC | Age: 51
End: 2021-04-30
Attending: PHYSICIAN ASSISTANT
Payer: COMMERCIAL

## 2021-04-30 ENCOUNTER — OFFICE VISIT (OUTPATIENT)
Dept: URGENT CARE | Facility: URGENT CARE | Age: 51
End: 2021-04-30
Payer: COMMERCIAL

## 2021-04-30 VITALS
BODY MASS INDEX: 29.66 KG/M2 | DIASTOLIC BLOOD PRESSURE: 74 MMHG | HEIGHT: 65 IN | OXYGEN SATURATION: 100 % | TEMPERATURE: 98 F | WEIGHT: 178 LBS | SYSTOLIC BLOOD PRESSURE: 112 MMHG | HEART RATE: 78 BPM

## 2021-04-30 VITALS
WEIGHT: 178 LBS | TEMPERATURE: 97.9 F | RESPIRATION RATE: 18 BRPM | SYSTOLIC BLOOD PRESSURE: 129 MMHG | DIASTOLIC BLOOD PRESSURE: 85 MMHG | BODY MASS INDEX: 29.62 KG/M2 | HEART RATE: 73 BPM | OXYGEN SATURATION: 99 %

## 2021-04-30 DIAGNOSIS — R10.9 FLANK PAIN: Primary | ICD-10-CM

## 2021-04-30 DIAGNOSIS — R10.12 ABDOMINAL PAIN, LEFT UPPER QUADRANT: ICD-10-CM

## 2021-04-30 DIAGNOSIS — R10.9 FLANK PAIN: ICD-10-CM

## 2021-04-30 DIAGNOSIS — M54.6 ACUTE LEFT-SIDED THORACIC BACK PAIN: ICD-10-CM

## 2021-04-30 DIAGNOSIS — R10.32 ABDOMINAL PAIN, LEFT LOWER QUADRANT: ICD-10-CM

## 2021-04-30 DIAGNOSIS — C90.00 MULTIPLE MYELOMA NOT HAVING ACHIEVED REMISSION (H): ICD-10-CM

## 2021-04-30 DIAGNOSIS — R93.89 ABNORMAL FINDING ON CT SCAN: ICD-10-CM

## 2021-04-30 DIAGNOSIS — C90.00 MULTIPLE MYELOMA NOT HAVING ACHIEVED REMISSION (H): Primary | ICD-10-CM

## 2021-04-30 DIAGNOSIS — C79.51 BONE METASTASIS: ICD-10-CM

## 2021-04-30 LAB
ALBUMIN SERPL-MCNC: 4 G/DL (ref 3.4–5)
ALBUMIN SERPL-MCNC: 4.1 G/DL (ref 3.4–5)
ALBUMIN UR-MCNC: ABNORMAL MG/DL
ALP SERPL-CCNC: 61 U/L (ref 40–150)
ALP SERPL-CCNC: 62 U/L (ref 40–150)
ALT SERPL W P-5'-P-CCNC: 26 U/L (ref 0–70)
ALT SERPL W P-5'-P-CCNC: 30 U/L (ref 0–70)
ANION GAP SERPL CALCULATED.3IONS-SCNC: 2 MMOL/L (ref 3–14)
ANION GAP SERPL CALCULATED.3IONS-SCNC: 7 MMOL/L (ref 3–14)
APPEARANCE UR: CLEAR
AST SERPL W P-5'-P-CCNC: 44 U/L (ref 0–45)
AST SERPL W P-5'-P-CCNC: 49 U/L (ref 0–45)
BACTERIA #/AREA URNS HPF: ABNORMAL /HPF
BASOPHILS # BLD AUTO: 0 10E9/L (ref 0–0.2)
BASOPHILS # BLD AUTO: 0.1 10E9/L (ref 0–0.2)
BASOPHILS NFR BLD AUTO: 0.4 %
BASOPHILS NFR BLD AUTO: 0.5 %
BILIRUB SERPL-MCNC: 0.4 MG/DL (ref 0.2–1.3)
BILIRUB SERPL-MCNC: 0.5 MG/DL (ref 0.2–1.3)
BILIRUB UR QL STRIP: NEGATIVE
BUN SERPL-MCNC: 17 MG/DL (ref 7–30)
BUN SERPL-MCNC: 19 MG/DL (ref 7–30)
CALCIUM SERPL-MCNC: 8.8 MG/DL (ref 8.5–10.1)
CALCIUM SERPL-MCNC: 9 MG/DL (ref 8.5–10.1)
CHLORIDE SERPL-SCNC: 104 MMOL/L (ref 94–109)
CHLORIDE SERPL-SCNC: 104 MMOL/L (ref 94–109)
CO2 SERPL-SCNC: 27 MMOL/L (ref 20–32)
CO2 SERPL-SCNC: 30 MMOL/L (ref 20–32)
COLOR UR AUTO: YELLOW
CREAT SERPL-MCNC: 1.07 MG/DL (ref 0.66–1.25)
CREAT SERPL-MCNC: 1.2 MG/DL (ref 0.66–1.25)
CRP SERPL-MCNC: <2.9 MG/L (ref 0–8)
D DIMER PPP FEU-MCNC: <0.3 UG/ML FEU (ref 0–0.5)
DIFFERENTIAL METHOD BLD: ABNORMAL
DIFFERENTIAL METHOD BLD: ABNORMAL
EOSINOPHIL # BLD AUTO: 1 10E9/L (ref 0–0.7)
EOSINOPHIL # BLD AUTO: 1 10E9/L (ref 0–0.7)
EOSINOPHIL NFR BLD AUTO: 8.7 %
EOSINOPHIL NFR BLD AUTO: 9.6 %
ERYTHROCYTE [DISTWIDTH] IN BLOOD BY AUTOMATED COUNT: 14.4 % (ref 10–15)
ERYTHROCYTE [DISTWIDTH] IN BLOOD BY AUTOMATED COUNT: 15 % (ref 10–15)
GFR SERPL CREATININE-BSD FRML MDRD: 69 ML/MIN/{1.73_M2}
GFR SERPL CREATININE-BSD FRML MDRD: 79 ML/MIN/{1.73_M2}
GLUCOSE SERPL-MCNC: 105 MG/DL (ref 70–99)
GLUCOSE SERPL-MCNC: 115 MG/DL (ref 70–99)
GLUCOSE UR STRIP-MCNC: NEGATIVE MG/DL
HCT VFR BLD AUTO: 41 % (ref 40–53)
HCT VFR BLD AUTO: 42 % (ref 40–53)
HGB BLD-MCNC: 14 G/DL (ref 13.3–17.7)
HGB BLD-MCNC: 14.1 G/DL (ref 13.3–17.7)
HGB UR QL STRIP: ABNORMAL
IMM GRANULOCYTES # BLD: 0 10E9/L (ref 0–0.4)
IMM GRANULOCYTES NFR BLD: 0.4 %
KETONES UR STRIP-MCNC: NEGATIVE MG/DL
LEUKOCYTE ESTERASE UR QL STRIP: NEGATIVE
LYMPHOCYTES # BLD AUTO: 3.2 10E9/L (ref 0.8–5.3)
LYMPHOCYTES # BLD AUTO: 3.5 10E9/L (ref 0.8–5.3)
LYMPHOCYTES NFR BLD AUTO: 29.6 %
LYMPHOCYTES NFR BLD AUTO: 31.9 %
MCH RBC QN AUTO: 30.4 PG (ref 26.5–33)
MCH RBC QN AUTO: 30.7 PG (ref 26.5–33)
MCHC RBC AUTO-ENTMCNC: 33.6 G/DL (ref 31.5–36.5)
MCHC RBC AUTO-ENTMCNC: 34.1 G/DL (ref 31.5–36.5)
MCV RBC AUTO: 89 FL (ref 78–100)
MCV RBC AUTO: 92 FL (ref 78–100)
MONOCYTES # BLD AUTO: 1 10E9/L (ref 0–1.3)
MONOCYTES # BLD AUTO: 1 10E9/L (ref 0–1.3)
MONOCYTES NFR BLD AUTO: 9.2 %
MONOCYTES NFR BLD AUTO: 9.5 %
NEUTROPHILS # BLD AUTO: 5.5 10E9/L (ref 1.6–8.3)
NEUTROPHILS # BLD AUTO: 5.5 10E9/L (ref 1.6–8.3)
NEUTROPHILS NFR BLD AUTO: 49.3 %
NEUTROPHILS NFR BLD AUTO: 50.9 %
NITRATE UR QL: NEGATIVE
NON-SQ EPI CELLS #/AREA URNS LPF: ABNORMAL /LPF
NRBC # BLD AUTO: 0 10*3/UL
NRBC BLD AUTO-RTO: 0 /100
PH UR STRIP: 7 PH (ref 5–7)
PLATELET # BLD AUTO: 410 10E9/L (ref 150–450)
PLATELET # BLD AUTO: 412 10E9/L (ref 150–450)
POTASSIUM SERPL-SCNC: 3.7 MMOL/L (ref 3.4–5.3)
POTASSIUM SERPL-SCNC: 3.9 MMOL/L (ref 3.4–5.3)
PROT SERPL-MCNC: 7.1 G/DL (ref 6.8–8.8)
PROT SERPL-MCNC: 7.3 G/DL (ref 6.8–8.8)
PSA SERPL-MCNC: 0.45 UG/L (ref 0–4)
RBC # BLD AUTO: 4.59 10E12/L (ref 4.4–5.9)
RBC # BLD AUTO: 4.6 10E12/L (ref 4.4–5.9)
RBC #/AREA URNS AUTO: ABNORMAL /HPF
SODIUM SERPL-SCNC: 136 MMOL/L (ref 133–144)
SODIUM SERPL-SCNC: 138 MMOL/L (ref 133–144)
SOURCE: ABNORMAL
SP GR UR STRIP: 1.01 (ref 1–1.03)
UROBILINOGEN UR STRIP-ACNC: 0.2 EU/DL (ref 0.2–1)
WBC # BLD AUTO: 10.8 10E9/L (ref 4–11)
WBC # BLD AUTO: 11.1 10E9/L (ref 4–11)
WBC #/AREA URNS AUTO: ABNORMAL /HPF

## 2021-04-30 PROCEDURE — 83883 ASSAY NEPHELOMETRY NOT SPEC: CPT | Performed by: INTERNAL MEDICINE

## 2021-04-30 PROCEDURE — 85379 FIBRIN DEGRADATION QUANT: CPT | Performed by: PHYSICIAN ASSISTANT

## 2021-04-30 PROCEDURE — 36415 COLL VENOUS BLD VENIPUNCTURE: CPT | Performed by: INTERNAL MEDICINE

## 2021-04-30 PROCEDURE — 82784 ASSAY IGA/IGD/IGG/IGM EACH: CPT | Performed by: INTERNAL MEDICINE

## 2021-04-30 PROCEDURE — 81001 URINALYSIS AUTO W/SCOPE: CPT | Performed by: PHYSICIAN ASSISTANT

## 2021-04-30 PROCEDURE — 84153 ASSAY OF PSA TOTAL: CPT | Performed by: INTERNAL MEDICINE

## 2021-04-30 PROCEDURE — 71046 X-RAY EXAM CHEST 2 VIEWS: CPT | Performed by: RADIOLOGY

## 2021-04-30 PROCEDURE — 86334 IMMUNOFIX E-PHORESIS SERUM: CPT | Mod: 26 | Performed by: PATHOLOGY

## 2021-04-30 PROCEDURE — 99215 OFFICE O/P EST HI 40 MIN: CPT | Performed by: NURSE PRACTITIONER

## 2021-04-30 PROCEDURE — 86140 C-REACTIVE PROTEIN: CPT | Performed by: INTERNAL MEDICINE

## 2021-04-30 PROCEDURE — 74176 CT ABD & PELVIS W/O CONTRAST: CPT

## 2021-04-30 PROCEDURE — 86335 IMMUNFIX E-PHORSIS/URINE/CSF: CPT | Mod: TC | Performed by: INTERNAL MEDICINE

## 2021-04-30 PROCEDURE — 87591 N.GONORRHOEAE DNA AMP PROB: CPT | Performed by: PHYSICIAN ASSISTANT

## 2021-04-30 PROCEDURE — 85025 COMPLETE CBC W/AUTO DIFF WBC: CPT | Performed by: PHYSICIAN ASSISTANT

## 2021-04-30 PROCEDURE — 80053 COMPREHEN METABOLIC PANEL: CPT | Performed by: INTERNAL MEDICINE

## 2021-04-30 PROCEDURE — 999N001036 HC STATISTIC TOTAL PROTEIN: Performed by: INTERNAL MEDICINE

## 2021-04-30 PROCEDURE — 84165 PROTEIN E-PHORESIS SERUM: CPT | Mod: TC | Performed by: INTERNAL MEDICINE

## 2021-04-30 PROCEDURE — 87491 CHLMYD TRACH DNA AMP PROBE: CPT | Performed by: PHYSICIAN ASSISTANT

## 2021-04-30 PROCEDURE — 82232 ASSAY OF BETA-2 PROTEIN: CPT | Performed by: INTERNAL MEDICINE

## 2021-04-30 PROCEDURE — 84166 PROTEIN E-PHORESIS/URINE/CSF: CPT | Mod: 26 | Performed by: PATHOLOGY

## 2021-04-30 PROCEDURE — 84165 PROTEIN E-PHORESIS SERUM: CPT | Mod: 26 | Performed by: PATHOLOGY

## 2021-04-30 PROCEDURE — 85025 COMPLETE CBC W/AUTO DIFF WBC: CPT | Performed by: INTERNAL MEDICINE

## 2021-04-30 PROCEDURE — 86334 IMMUNOFIX E-PHORESIS SERUM: CPT | Mod: TC | Performed by: INTERNAL MEDICINE

## 2021-04-30 PROCEDURE — 36415 COLL VENOUS BLD VENIPUNCTURE: CPT | Performed by: PHYSICIAN ASSISTANT

## 2021-04-30 PROCEDURE — 84166 PROTEIN E-PHORESIS/URINE/CSF: CPT | Mod: TC | Performed by: INTERNAL MEDICINE

## 2021-04-30 PROCEDURE — 99207 PR FIRST ORDER ACUTE REFERRAL: CPT | Performed by: PHYSICIAN ASSISTANT

## 2021-04-30 PROCEDURE — 80053 COMPREHEN METABOLIC PANEL: CPT | Performed by: PHYSICIAN ASSISTANT

## 2021-04-30 PROCEDURE — 86335 IMMUNFIX E-PHORSIS/URINE/CSF: CPT | Mod: 26 | Performed by: PATHOLOGY

## 2021-04-30 ASSESSMENT — ENCOUNTER SYMPTOMS
HEMATOLOGIC/LYMPHATIC NEGATIVE: 1
RESPIRATORY NEGATIVE: 1
FREQUENCY: 0
DIARRHEA: 0
DYSURIA: 0
FLANK PAIN: 1
HEMATURIA: 0
ABDOMINAL PAIN: 1
CONSTITUTIONAL NEGATIVE: 1
HEMATOCHEZIA: 0
EYES NEGATIVE: 1
NEUROLOGICAL NEGATIVE: 1
CONSTIPATION: 0
DIFFICULTY URINATING: 0
CARDIOVASCULAR NEGATIVE: 1
NAUSEA: 1
VOMITING: 0

## 2021-04-30 ASSESSMENT — MIFFLIN-ST. JEOR: SCORE: 1584.28
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
## 2021-04-30 NOTE — PROGRESS NOTES
"    Assessment & Plan     Flank pain  Left flank pain with hematuria  UA positive for blood  Referred to the ADS for CT scan  - *UA reflex to Microscopic and Culture ([place] and [facility] (except [place] and [place])  - UA with Microscopic reflex to Culture  - CBC with platelets differential  - Comprehensive metabolic panel  - D dimer, quantitative  - XR Chest 2 Views  - NEISSERIA GONORRHOEA PCR  - CHLAMYDIA TRACHOMATIS PCR  - Referral to Acute and Diagnostic Services (Day of diagnostic / First order acute)    Acute left-sided thoracic back pain  D-dimer negative for PE  Chest xray Negative for acute findings, read by Werner Rivas PA-C [facility] at time of visit.  CBC neg for infection  CMP shows neg renal function  - UA with Microscopic reflex to Culture  - CBC with platelets differential  - Comprehensive metabolic panel  - D dimer, quantitative  - XR Chest 2 Views  - Referral to Acute and Diagnostic Services (Day of diagnostic / First order acute)         CONSULTATION/REFERRAL to ADS      Werner Rivas PA-C  [facility] [place] Watkins is a 50 year old who presents for the following health issues     HPI     Left flank pain  Blood in urine  Ongoing 1 week    Review of Systems   Constitutional, HEENT, cardiovascular, pulmonary, GI, , musculoskeletal, neuro, skin, endocrine and psych systems are negative, except as otherwise noted.      Objective    /74   Pulse 78   Temp 98  F (36.7  C) (Tympanic)   Ht 1.651 m (5' 5\")   Wt 80.7 kg (178 lb)   SpO2 100%   BMI 29.62 kg/m    Body mass index is 29.62 kg/m .  Physical Exam   GENERAL: healthy, alert and no distress  RESP: lungs clear to auscultation - no rales, rhonchi or wheezes  CV: regular rate and rhythm, normal S1 S2, no S3 or S4, no murmur, click or rub, no peripheral edema and peripheral pulses strong  ABDOMEN: soft, nontender, no hepatosplenomegaly, no masses and bowel sounds normal  MS: Positive for left " flank pain, tenderness  SKIN: no suspicious lesions or rashes  NEURO: Normal strength and tone, mentation intact and speech normal  PSYCH: mentation appears normal, affect normal/bright    Results for orders placed or performed in visit on 04/30/21   XR Chest 2 Views     Status: None (Preliminary result)    Narrative    CHEST TWO VIEW   4/30/2021 10:45 AM     HISTORY: Flank pain; Acute left-sided thoracic back pain.    COMPARISON: Chest x-ray 7/25/2020.      Impression    IMPRESSION: PA and lateral views of the chest. Lungs are clear. Heart  is normal in size. No effusions are evident. No pneumothorax.   UA with Microscopic reflex to Culture     Status: Abnormal    Specimen: Midstream Urine   Result Value Ref Range    Color Urine Yellow     Appearance Urine Clear     Glucose Urine Negative NEG^Negative mg/dL    Bilirubin Urine Negative NEG^Negative    Ketones Urine Negative NEG^Negative mg/dL    Specific Gravity Urine 1.015 1.003 - 1.035    pH Urine 7.0 5.0 - 7.0 pH    Protein Albumin Urine Trace (A) NEG^Negative mg/dL    Urobilinogen Urine 0.2 0.2 - 1.0 EU/dL    Nitrite Urine Negative NEG^Negative    Blood Urine Trace (A) NEG^Negative    Leukocyte Esterase Urine Negative NEG^Negative    Source Midstream Urine     WBC Urine 0 - 5 OTO5^0 - 5 /HPF    RBC Urine O - 2 OTO2^O - 2 /HPF    Squamous Epithelial /LPF Urine Few FEW^Few /LPF    Bacteria Urine Few (A) NEG^Negative /HPF   CBC with platelets differential     Status: Abnormal   Result Value Ref Range    WBC 10.8 4.0 - 11.0 10e9/L    RBC Count 4.59 4.4 - 5.9 10e12/L    Hemoglobin 14.1 13.3 - 17.7 g/dL    Hematocrit 42.0 40.0 - 53.0 %    MCV 92 78 - 100 fl    MCH 30.7 26.5 - 33.0 pg    MCHC 33.6 31.5 - 36.5 g/dL    RDW 15.0 10.0 - 15.0 %    Platelet Count 412 150 - 450 10e9/L    % Neutrophils 50.9 %    % Lymphocytes 29.6 %    % Monocytes 9.5 %    % Eosinophils 9.6 %    % Basophils 0.4 %    Absolute Neutrophil 5.5 1.6 - 8.3 10e9/L    Absolute Lymphocytes 3.2 0.8 - 5.3  10e9/L    Absolute Monocytes 1.0 0.0 - 1.3 10e9/L    Absolute Eosinophils 1.0 (H) 0.0 - 0.7 10e9/L    Absolute Basophils 0.0 0.0 - 0.2 10e9/L    Diff Method Automated Method    Comprehensive metabolic panel     Status: Abnormal   Result Value Ref Range    Sodium 136 133 - 144 mmol/L    Potassium 3.9 3.4 - 5.3 mmol/L    Chloride 104 94 - 109 mmol/L    Carbon Dioxide 30 20 - 32 mmol/L    Anion Gap 2 (L) 3 - 14 mmol/L    Glucose 115 (H) 70 - 99 mg/dL    Urea Nitrogen 19 7 - 30 mg/dL    Creatinine 1.20 0.66 - 1.25 mg/dL    GFR Estimate 69 >60 mL/min/[1.73_m2]    GFR Estimate If Black 80 >60 mL/min/[1.73_m2]    Calcium 8.8 8.5 - 10.1 mg/dL    Bilirubin Total 0.5 0.2 - 1.3 mg/dL    Albumin 4.1 3.4 - 5.0 g/dL    Protein Total 7.3 6.8 - 8.8 g/dL    Alkaline Phosphatase 61 40 - 150 U/L    ALT 30 0 - 70 U/L    AST 49 (H) 0 - 45 U/L   D dimer, quantitative     Status: None   Result Value Ref Range    D Dimer <0.3 0.0 - 0.50 ug/ml FEU

## 2021-04-30 NOTE — PATIENT INSTRUCTIONS
Dr. Elena who is a hematologist/oncologist was consulted today. He will be ordering some more blood tests, urine test and additional scans for you. His office will contact you and set up all the details for tests and follow up care.     In the meantime you can use Tylenol of ibuprofen for any pain.

## 2021-05-01 LAB
C TRACH DNA SPEC QL NAA+PROBE: NEGATIVE
N GONORRHOEA DNA SPEC QL NAA+PROBE: NEGATIVE
SPECIMEN SOURCE: NORMAL
SPECIMEN SOURCE: NORMAL

## 2021-05-01 NOTE — PROGRESS NOTES
I have reviewed the labs available so far and all of them are essentially normal.     I did leave him a VM. He did not  the phone.     We still are waiting for the protein electrophoresis and immunofixation which is pending.     We might need a bone marrow biopsy. The CT with lytic lesions does not fit with the remainder of evaluation. He does not have albumin total protein gradient to suggest myeloma. His PSA is normal. His hemoglobin is 14 g/dl and platelet of 410 which is not typical for most malignancies - does not rule out but would be odd to have widespread malignancy and completely normal hemoglobin.  Eventually a biopsy would be needed.       Mirza Elena    Hematologist and Medical Oncologist  [facility]

## 2021-05-03 DIAGNOSIS — E06.3 HYPOTHYROIDISM DUE TO HASHIMOTO'S THYROIDITIS: Primary | ICD-10-CM

## 2021-05-03 DIAGNOSIS — I25.10 CORONARY ARTERY DISEASE INVOLVING NATIVE CORONARY ARTERY OF NATIVE HEART WITHOUT ANGINA PECTORIS: ICD-10-CM

## 2021-05-03 LAB
ALBUMIN MFR UR ELPH: 38.2 %
ALPHA1 GLOB MFR UR ELPH: 4.2 %
ALPHA2 GLOB MFR UR ELPH: 11.2 %
ANION GAP SERPL CALCULATED.3IONS-SCNC: 6 MMOL/L (ref 3–14)
B-GLOBULIN MFR UR ELPH: 38 %
B2 MICROGLOB SERPL-MCNC: 2.7 MG/L
BUN SERPL-MCNC: 15 MG/DL (ref 7–30)
CALCIUM SERPL-MCNC: 9.6 MG/DL (ref 8.5–10.1)
CHLORIDE SERPL-SCNC: 107 MMOL/L (ref 94–109)
CO2 SERPL-SCNC: 26 MMOL/L (ref 20–32)
CREAT SERPL-MCNC: 1.01 MG/DL (ref 0.66–1.25)
GAMMA GLOB MFR UR ELPH: 8.4 %
GFR SERPL CREATININE-BSD FRML MDRD: 85 ML/MIN/{1.73_M2}
GLUCOSE SERPL-MCNC: 166 MG/DL (ref 70–99)
KAPPA LC UR-MCNC: 0.8 MG/DL (ref 0.33–1.94)
KAPPA LC/LAMBDA SER: 0.01 {RATIO} (ref 0.26–1.65)
LAMBDA LC SERPL-MCNC: 102.59 MG/DL (ref 0.57–2.63)
M PROTEIN MFR UR ELPH: 34.8 %
POTASSIUM SERPL-SCNC: 4 MMOL/L (ref 3.4–5.3)
PROT ELPH PNL UR ELPH: NORMAL
PROT PATTERN UR ELPH-IMP: ABNORMAL
SODIUM SERPL-SCNC: 139 MMOL/L (ref 133–144)
T3FREE SERPL-MCNC: 2.6 PG/ML (ref 2.3–4.2)
T4 FREE SERPL-MCNC: 0.78 NG/DL (ref 0.76–1.46)
TOTAL LIGHT CHAINS UR: NORMAL
TSH SERPL DL<=0.005 MIU/L-ACNC: 3.96 MU/L (ref 0.4–4)

## 2021-05-03 PROCEDURE — 84481 FREE ASSAY (FT-3): CPT | Performed by: NURSE PRACTITIONER

## 2021-05-03 PROCEDURE — 84443 ASSAY THYROID STIM HORMONE: CPT | Performed by: NURSE PRACTITIONER

## 2021-05-03 PROCEDURE — 80048 BASIC METABOLIC PNL TOTAL CA: CPT | Performed by: INTERNAL MEDICINE

## 2021-05-03 PROCEDURE — 84439 ASSAY OF FREE THYROXINE: CPT | Performed by: NURSE PRACTITIONER

## 2021-05-03 RX ORDER — LEVOTHYROXINE SODIUM 25 UG/1
25 TABLET ORAL DAILY
Qty: 90 TABLET | Refills: 0 | OUTPATIENT
Start: 2021-05-03

## 2021-05-03 NOTE — TELEPHONE ENCOUNTER
I'm going to try to add on the thryoid levels to lab work he is having done now, make appointment with me and if cannot add this on I'd recommend he come fasting to recheck the fasting blood work as well, can then follow up on lipids and thryoid when he sees me.

## 2021-05-04 DIAGNOSIS — Z11.59 ENCOUNTER FOR SCREENING FOR OTHER VIRAL DISEASES: ICD-10-CM

## 2021-05-04 DIAGNOSIS — C90.00 MULTIPLE MYELOMA NOT HAVING ACHIEVED REMISSION (H): Primary | ICD-10-CM

## 2021-05-04 RX ORDER — LEVOTHYROXINE SODIUM 50 UG/1
50 TABLET ORAL DAILY
Qty: 90 TABLET | Refills: 0 | Status: SHIPPED | OUTPATIENT
Start: 2021-05-04 | End: 2021-08-04

## 2021-05-04 NOTE — PROGRESS NOTES
RNCC reviewed labs. Dr. Elena has contacted Pt as to preliminary labs and needs further information in work-up. Pt informed a PET/CT scan and bone marrow biopsy need to be completed with NGS to determine disease process and treatment plan going forward. Plan to follow-up with /Dr. Elena 2 weeks after bone marrow biopsy to discuss results and treatment planning.     Nicolasa Rayo, CYNTHIAN, RN, OCN  RN Cancer Care Coordinator  [facility] [place]  [phone]

## 2021-05-04 NOTE — TELEPHONE ENCOUNTER
Alex - we have three encounters opened for this situation - please review other encounters for additional information - discussed lab results with patient, ordered levothyroxine 50 mcg and discussed f/u appointment in 2-3    There are no fasting labs ordered - are you wanting patient to schedule fasting lab only appointment now?  What labs?

## 2021-05-04 NOTE — TELEPHONE ENCOUNTER
Signed Prescriptions:                        Disp   Refills    levothyroxine (SYNTHROID/LEVOTHROID) 50 MC*90 tab*0        Sig: Take 1 tablet (50 mcg) by mouth daily  Authorizing Provider: DOROTEO RAHMAN  Ordering User: GEORGIA SORTO    Reviewed lab results - sent medication

## 2021-05-04 NOTE — TELEPHONE ENCOUNTER
Please send this to us here at [facility].    Thank you,  Haven Mejia, [facility]  [phone]

## 2021-05-05 ENCOUNTER — TELEPHONE (OUTPATIENT)
Dept: ONCOLOGY | Facility: CLINIC | Age: 51
End: 2021-05-05

## 2021-05-05 LAB
ALBUMIN SERPL ELPH-MCNC: 4.4 G/DL (ref 3.7–5.1)
ALPHA1 GLOB SERPL ELPH-MCNC: 0.3 G/DL (ref 0.2–0.4)
ALPHA2 GLOB SERPL ELPH-MCNC: 0.6 G/DL (ref 0.5–0.9)
B-GLOBULIN SERPL ELPH-MCNC: 0.9 G/DL (ref 0.6–1)
GAMMA GLOB SERPL ELPH-MCNC: 0.7 G/DL (ref 0.7–1.6)
IGA SERPL-MCNC: 42 MG/DL (ref 84–499)
IGG SERPL-MCNC: 702 MG/DL (ref 610–1616)
IGM SERPL-MCNC: 12 MG/DL (ref 35–242)
M PROTEIN SERPL ELPH-MCNC: 0.2 G/DL
PROT PATTERN SERPL ELPH-IMP: ABNORMAL
PROT PATTERN SERPL IFE-IMP: ABNORMAL

## 2021-05-05 NOTE — TELEPHONE ENCOUNTER
Med ordered, return visit in 3 months cn do labs then. Thanks. Close if nothing further is needed please

## 2021-05-07 NOTE — TELEPHONE ENCOUNTER
RECORDS STATUS - ALL OTHER DIAGNOSIS      RECORDS RECEIVED FROM: [facility]   DATE RECEIVED: 5/7   NOTES STATUS DETAILS   OFFICE NOTE from referring provider Philomena Aranda CNP in [place] [facility]: 4/30/21   OFFICE NOTE from medical oncologist     DISCHARGE SUMMARY from hospital     DISCHARGE REPORT from the ER     OPERATIVE REPORT Scheduled @ [facility] 5/17/21: [facility]  10/30/18: Colonoscopy   MEDICATION LIST [facility] 5/4/21   CLINICAL TRIAL TREATMENTS TO DATE     LABS     PATHOLOGY REPORTS     ANYTHING RELATED TO DIAGNOSIS Epic 4/30/21, 5/3/21   GENONOMIC TESTING     TYPE:     IMAGING (NEED IMAGES & REPORT)     XR  PACS 4/30/21: Epic   CT SCANS PACS 4/30/21: [facility]   MRI     MAMMO     ULTRASOUND     PET Scheduled @ [facility] 5/11/21: [facility]

## 2021-05-11 ENCOUNTER — HOSPITAL ENCOUNTER (OUTPATIENT)
Dept: PET IMAGING | Facility: CLINIC | Age: 51
End: 2021-05-11
Attending: INTERNAL MEDICINE
Payer: COMMERCIAL

## 2021-05-11 DIAGNOSIS — C90.00 MULTIPLE MYELOMA NOT HAVING ACHIEVED REMISSION (H): ICD-10-CM

## 2021-05-11 PROCEDURE — 70491 CT SOFT TISSUE NECK W/DYE: CPT | Mod: 26 | Performed by: RADIOLOGY

## 2021-05-11 PROCEDURE — 71260 CT THORAX DX C+: CPT | Mod: 26 | Performed by: RADIOLOGY

## 2021-05-11 PROCEDURE — 71260 CT THORAX DX C+: CPT

## 2021-05-11 PROCEDURE — 70491 CT SOFT TISSUE NECK W/DYE: CPT

## 2021-05-11 PROCEDURE — 343N000001 HC RX 343: Performed by: INTERNAL MEDICINE

## 2021-05-11 PROCEDURE — 74177 CT ABD & PELVIS W/CONTRAST: CPT | Mod: 26 | Performed by: RADIOLOGY

## 2021-05-11 PROCEDURE — 78816 PET IMAGE W/CT FULL BODY: CPT | Mod: 26 | Performed by: RADIOLOGY

## 2021-05-11 PROCEDURE — 250N000011 HC RX IP 250 OP 636: Performed by: INTERNAL MEDICINE

## 2021-05-11 PROCEDURE — A9552 F18 FDG: HCPCS | Performed by: INTERNAL MEDICINE

## 2021-05-11 PROCEDURE — 78816 PET IMAGE W/CT FULL BODY: CPT | Mod: PI

## 2021-05-11 RX ORDER — IOPAMIDOL 755 MG/ML
10-135 INJECTION, SOLUTION INTRAVASCULAR ONCE
Status: COMPLETED | OUTPATIENT
Start: 2021-05-11 | End: 2021-05-11

## 2021-05-11 RX ADMIN — IOPAMIDOL 109 ML: 755 INJECTION, SOLUTION INTRAVENOUS at 14:05

## 2021-05-11 RX ADMIN — FLUDEOXYGLUCOSE F-18 11.54 MCI.: 500 INJECTION, SOLUTION INTRAVENOUS at 13:15

## 2021-05-12 ENCOUNTER — ONCOLOGY VISIT (OUTPATIENT)
Dept: ONCOLOGY | Facility: CLINIC | Age: 51
End: 2021-05-12
Attending: NURSE PRACTITIONER
Payer: COMMERCIAL

## 2021-05-12 ENCOUNTER — PRE VISIT (OUTPATIENT)
Dept: ONCOLOGY | Facility: CLINIC | Age: 51
End: 2021-05-12

## 2021-05-12 VITALS
SYSTOLIC BLOOD PRESSURE: 160 MMHG | OXYGEN SATURATION: 97 % | HEART RATE: 95 BPM | RESPIRATION RATE: 16 BRPM | WEIGHT: 176 LBS | DIASTOLIC BLOOD PRESSURE: 99 MMHG | BODY MASS INDEX: 29.29 KG/M2 | TEMPERATURE: 98.1 F

## 2021-05-12 DIAGNOSIS — S22.32XA CLOSED FRACTURE OF ONE RIB OF LEFT SIDE, INITIAL ENCOUNTER: ICD-10-CM

## 2021-05-12 DIAGNOSIS — C90.00 MULTIPLE MYELOMA NOT HAVING ACHIEVED REMISSION (H): Primary | ICD-10-CM

## 2021-05-12 PROCEDURE — 99204 OFFICE O/P NEW MOD 45 MIN: CPT | Performed by: STUDENT IN AN ORGANIZED HEALTH CARE EDUCATION/TRAINING PROGRAM

## 2021-05-12 PROCEDURE — G0463 HOSPITAL OUTPT CLINIC VISIT: HCPCS

## 2021-05-12 RX ORDER — HYDROCHLOROTHIAZIDE 50 MG/1
50 TABLET ORAL DAILY
COMMUNITY
End: 2021-07-14

## 2021-05-12 RX ORDER — DEXAMETHASONE 4 MG/1
40 TABLET ORAL
Qty: 40 TABLET | Refills: 0 | Status: SHIPPED | OUTPATIENT
Start: 2021-05-12 | End: 2021-05-27

## 2021-05-12 RX ORDER — FAMOTIDINE 20 MG
1000 TABLET ORAL DAILY
COMMUNITY
End: 2021-05-12

## 2021-05-12 ASSESSMENT — MIFFLIN-ST. JEOR: SCORE: 1570.67

## 2021-05-12 ASSESSMENT — PAIN SCALES - GENERAL: PAINLEVEL: SEVERE PAIN (6)

## 2021-05-12 NOTE — H&P (VIEW-ONLY)
Malignant Hematology Consult    Reason for consult: newly diagnosed myeloma         Assessment and Recommendation:   Logan has multiple myeloma. He is scheduled for a bone marrow biopsy on 5/17. He has rib pain so will start pulse dexamethasone 40 mg x 4 days. He will take this in the am with food to avoid insomnia. We spent most of today's visit discussing the pathophysiology of myeloma. He is otherwise very healthy and likely a good candidate for auto SCT. Pending his bone marrow results I will likely recommend [id number], followed by auto SCT.      He has numerous lytic lesions and left nondisplaced rib fracture. His Vit D was checked in January and was WNL but given his bone lesions, he will increase his supplement to 10,000 international unit(s). Also we discussed zometa and that the goal is to do 2 years of monthly infusions. He will work on getting his dental work completed so we can start this.     See me next Wed to discuss treatment in greater detail. Will ask pharmacy to look into revlimid approval.   Barbi Vazquez MD PhD           History of Present Illness:   This patient is a 51 yo man seen in April for hematuria. Work up revealed a monoclonal protein in the urine (34.8) and blood 0.2. A CT scan of abdomen and pelvis showed numerous lytic lesions in the spine, ribs, and pelvis. PET scan confirms this and notes possible nondisplaced fracture of the 9th rib. Today he notes feeling well except for pain in his left ribcage with deep breathing or stretching. He denies any lumps, bumps, or rashes. He has stopped playing soccer for now since discovering his lytic lesions. He has some pending dental work to replace a bridge but no other teeth problems. He recently started a vitamin d supplement to help strengthen his bones.     Oncologic History:  - 4/30/2021 M spike of 34.8 in urine.M spike in blood 0.2; IgG 702 with depressed IgA and IgM. Beta 2 microglobulin 2.7. Lambda  with K/L ratio of 0.01. WBC 11.1  with absolute eos of 1.0. hgb, plt, Cr (1.1), and albumin WNL.    -4/30/2021 CT abd/pelvis showed sclerotic marrow changes with numerous lytic appearing lesions throughout the imaged portions of the spine, pelvis and ribs.      -PET scan 5/11/2021 Numerous osteolytic lesions which predominantly demonstrate uptake below liver background levels. There is one intraosseous lesion in the left lateral 9th rib that demonstrates uptake above background, possibly due to nondisplaced fracture. Adjacent to this lesion is mild  hypermetabolism to the left pleura, with new small left pleural effusion, suspicious for malignant effusion versus posttraumatic effusion. Pleural uptake may represent extramedullary myeloma extending along the intercostal space versus inflammation.        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  Lives in [place]             Family History:   Mother- HTN  No cancer in family           Review of Systems:     A comprehensive ROS was performed and is negative except as noted above         Physical Exam:   BP (!) 160/99   Pulse 95   Temp 98.1  F (36.7  C) (Oral)   Resp 16   Wt 79.8 kg (176 lb)   SpO2 97%   BMI 29.29 kg/m    Constitutional: NAD  Eyes: no scleral icterus  ENT: no oral lesions  Lymph: no cervical, supraclavicular, axillary LAD  Pulm: CTAB  CV: RRR, no m/r/g  GI: bowel sounds present, soft and nontender to palpation  MSK: No edema in the extremities  Neuro: alert, conversant, normal gait  Psych: appropriate mood and affect

## 2021-05-12 NOTE — LETTER
5/12/2021         RE: June Ronquillo  [address]  [facility] [address]        Dear Colleague,    Thank you for referring your patient, June Ronquillo, to the [facility]. Please see a copy of my visit note below.    Malignant Hematology Consult    Reason for consult: newly diagnosed myeloma         Assessment and Recommendation:   Logan has multiple myeloma. He is scheduled for a bone marrow biopsy on 5/17. He has rib pain so will start pulse dexamethasone 40 mg x 4 days. He will take this in the am with food to avoid insomnia. We spent most of today's visit discussing the pathophysiology of myeloma. He is otherwise very healthy and likely a good candidate for auto SCT. Pending his bone marrow results I will likely recommend vidya-RVd, followed by auto SCT.      He has numerous lytic lesions and left nondisplaced rib fracture. His Vit D was checked in January and was WNL but given his bone lesions, he will increase his supplement to 10,000 international unit(s). Also we discussed zometa and that the goal is to do 2 years of monthly infusions. He will work on getting his dental work completed so we can start this.     See me next Wed to discuss treatment in greater detail. Will ask pharmacy to look into revlimid approval.   Barbi Vazquez MD PhD           History of Present Illness:   This patient is a 49 yo man seen in April for hematuria. Work up revealed a monoclonal protein in the urine (34.8) and blood 0.2. A CT scan of abdomen and pelvis showed numerous lytic lesions in the spine, ribs, and pelvis. PET scan confirms this and notes possible nondisplaced fracture of the 9th rib. Today he notes feeling well except for pain in his left ribcage with deep breathing or stretching. He denies any lumps, bumps, or rashes. He has stopped playing soccer for now since discovering his lytic lesions. He has some pending dental work to replace a bridge but no other teeth problems.  He recently started a vitamin d supplement to help strengthen his bones.     Oncologic History:  - 4/30/2021 M spike of 34.8 in urine.M spike in blood 0.2; IgG 702 with depressed IgA and IgM. Beta 2 microglobulin 2.7. Lambda  with K/L ratio of 0.01. WBC 11.1 with absolute eos of 1.0. hgb, plt, Cr (1.1), and albumin WNL.    -4/30/2021 CT abd/pelvis showed sclerotic marrow changes with numerous lytic appearing lesions throughout the imaged portions of the spine, pelvis and ribs.      -PET scan 5/11/2021 Numerous osteolytic lesions which predominantly demonstrate uptake below liver background levels. There is one intraosseous lesion in the left lateral 9th rib that demonstrates uptake above background, possibly due to nondisplaced fracture. Adjacent to this lesion is mild  hypermetabolism to the left pleura, with new small left pleural effusion, suspicious for malignant effusion versus posttraumatic effusion. Pleural uptake may represent extramedullary myeloma extending along the intercostal space versus inflammation.        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  Lives in [facility]             Family History:   Mother- HTN  No cancer in family           Review of Systems:     A comprehensive ROS was performed and is negative except as noted above         Physical Exam:   BP (!) 160/99   Pulse 95   Temp 98.1  F (36.7  C) (Oral)   Resp 16   Wt 79.8 kg (176 lb)   SpO2 97%   BMI 29.29 kg/m    Constitutional: NAD  Eyes: no scleral icterus  ENT: no oral lesions  Lymph: no cervical, supraclavicular, axillary LAD  Pulm: CTAB  CV: RRR, no m/r/g  GI: bowel sounds present, soft and nontender to palpation  MSK: No edema in the extremities  Neuro: alert, conversant, normal gait  Psych: appropriate mood and affect          Again, thank you for allowing me to  participate in the care of your patient.        Sincerely,        Barbi Vazquez MD

## 2021-05-12 NOTE — PROGRESS NOTES
Malignant Hematology Consult    Reason for consult: newly diagnosed myeloma         Assessment and Recommendation:   Logan has multiple myeloma. He is scheduled for a bone marrow biopsy on 5/17. He has rib pain so will start pulse dexamethasone 40 mg x 4 days. He will take this in the am with food to avoid insomnia. We spent most of today's visit discussing the pathophysiology of myeloma. He is otherwise very healthy and likely a good candidate for auto SCT. Pending his bone marrow results I will likely recommend vidya-RVd, followed by auto SCT.      He has numerous lytic lesions and left nondisplaced rib fracture. His Vit D was checked in January and was WNL but given his bone lesions, he will increase his supplement to 10,000 international unit(s). Also we discussed zometa and that the goal is to do 2 years of monthly infusions. He will work on getting his dental work completed so we can start this.     See me next Wed to discuss treatment in greater detail. Will ask pharmacy to look into revlimid approval.   Barbi Vazquez MD PhD           History of Present Illness:   This patient is a 49 yo man seen in April for hematuria. Work up revealed a monoclonal protein in the urine (34.8) and blood 0.2. A CT scan of abdomen and pelvis showed numerous lytic lesions in the spine, ribs, and pelvis. PET scan confirms this and notes possible nondisplaced fracture of the 9th rib. Today he notes feeling well except for pain in his left ribcage with deep breathing or stretching. He denies any lumps, bumps, or rashes. He has stopped playing soccer for now since discovering his lytic lesions. He has some pending dental work to replace a bridge but no other teeth problems. He recently started a vitamin d supplement to help strengthen his bones.     Oncologic History:  - 4/30/2021 M spike of 34.8 in urine.M spike in blood 0.2; IgG 702 with depressed IgA and IgM. Beta 2 microglobulin 2.7. Lambda  with K/L ratio of 0.01. WBC 11.1  with absolute eos of 1.0. hgb, plt, Cr (1.1), and albumin WNL.    -4/30/2021 CT abd/pelvis showed sclerotic marrow changes with numerous lytic appearing lesions throughout the imaged portions of the spine, pelvis and ribs.      -PET scan 5/11/2021 Numerous osteolytic lesions which predominantly demonstrate uptake below liver background levels. There is one intraosseous lesion in the left lateral 9th rib that demonstrates uptake above background, possibly due to nondisplaced fracture. Adjacent to this lesion is mild  hypermetabolism to the left pleura, with new small left pleural effusion, suspicious for malignant effusion versus posttraumatic effusion. Pleural uptake may represent extramedullary myeloma extending along the intercostal space versus inflammation.        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  Lives in [place]             Family History:   Mother- HTN  No cancer in family           Review of Systems:     A comprehensive ROS was performed and is negative except as noted above         Physical Exam:   BP (!) 160/99   Pulse 95   Temp 98.1  F (36.7  C) (Oral)   Resp 16   Wt 79.8 kg (176 lb)   SpO2 97%   BMI 29.29 kg/m    Constitutional: NAD  Eyes: no scleral icterus  ENT: no oral lesions  Lymph: no cervical, supraclavicular, axillary LAD  Pulm: CTAB  CV: RRR, no m/r/g  GI: bowel sounds present, soft and nontender to palpation  MSK: No edema in the extremities  Neuro: alert, conversant, normal gait  Psych: appropriate mood and affect

## 2021-05-13 ENCOUNTER — TELEPHONE (OUTPATIENT)
Dept: ONCOLOGY | Facility: CLINIC | Age: 51
End: 2021-05-13

## 2021-05-13 DIAGNOSIS — Z11.59 ENCOUNTER FOR SCREENING FOR OTHER VIRAL DISEASES: ICD-10-CM

## 2021-05-13 LAB
LABORATORY COMMENT REPORT: NORMAL
SARS-COV-2 RNA RESP QL NAA+PROBE: NEGATIVE
SARS-COV-2 RNA RESP QL NAA+PROBE: NORMAL
SPECIMEN SOURCE: NORMAL
SPECIMEN SOURCE: NORMAL

## 2021-05-13 PROCEDURE — U0003 INFECTIOUS AGENT DETECTION BY NUCLEIC ACID (DNA OR RNA); SEVERE ACUTE RESPIRATORY SYNDROME CORONAVIRUS 2 (SARS-COV-2) (CORONAVIRUS DISEASE [COVID-19]), AMPLIFIED PROBE TECHNIQUE, MAKING USE OF HIGH THROUGHPUT TECHNOLOGIES AS DESCRIBED BY CMS-2020-01-R: HCPCS | Performed by: PATHOLOGY

## 2021-05-13 PROCEDURE — U0005 INFEC AGEN DETEC AMPLI PROBE: HCPCS | Performed by: PATHOLOGY

## 2021-05-13 RX ORDER — DEXAMETHASONE 4 MG/1
20 TABLET ORAL ONCE
Status: CANCELLED | OUTPATIENT
Start: 2021-06-11

## 2021-05-13 RX ORDER — MONTELUKAST SODIUM 10 MG/1
10 TABLET ORAL ONCE
Status: CANCELLED | OUTPATIENT
Start: 2021-06-11

## 2021-05-13 RX ORDER — MEPERIDINE HYDROCHLORIDE 25 MG/ML
25 INJECTION INTRAMUSCULAR; INTRAVENOUS; SUBCUTANEOUS EVERY 30 MIN PRN
Status: CANCELLED | OUTPATIENT
Start: 2021-06-04

## 2021-05-13 RX ORDER — DIPHENHYDRAMINE HYDROCHLORIDE 50 MG/ML
50 INJECTION INTRAMUSCULAR; INTRAVENOUS
Status: CANCELLED
Start: 2021-06-11

## 2021-05-13 RX ORDER — NALOXONE HYDROCHLORIDE 0.4 MG/ML
.1-.4 INJECTION, SOLUTION INTRAMUSCULAR; INTRAVENOUS; SUBCUTANEOUS
Status: CANCELLED | OUTPATIENT
Start: 2021-06-11

## 2021-05-13 RX ORDER — HEPARIN SODIUM (PORCINE) LOCK FLUSH IV SOLN 100 UNIT/ML 100 UNIT/ML
5 SOLUTION INTRAVENOUS
Status: CANCELLED | OUTPATIENT
Start: 2021-05-17

## 2021-05-13 RX ORDER — DEXAMETHASONE 4 MG/1
20 TABLET ORAL ONCE
Status: CANCELLED | OUTPATIENT
Start: 2021-06-04

## 2021-05-13 RX ORDER — MEPERIDINE HYDROCHLORIDE 25 MG/ML
25 INJECTION INTRAMUSCULAR; INTRAVENOUS; SUBCUTANEOUS EVERY 30 MIN PRN
Status: CANCELLED | OUTPATIENT
Start: 2021-05-17

## 2021-05-13 RX ORDER — METHYLPREDNISOLONE SODIUM SUCCINATE 125 MG/2ML
125 INJECTION, POWDER, LYOPHILIZED, FOR SOLUTION INTRAMUSCULAR; INTRAVENOUS
Status: CANCELLED
Start: 2021-05-17

## 2021-05-13 RX ORDER — HEPARIN SODIUM (PORCINE) LOCK FLUSH IV SOLN 100 UNIT/ML 100 UNIT/ML
5 SOLUTION INTRAVENOUS
Status: CANCELLED | OUTPATIENT
Start: 2021-06-11

## 2021-05-13 RX ORDER — LORAZEPAM 2 MG/ML
0.5 INJECTION INTRAMUSCULAR EVERY 4 HOURS PRN
Status: CANCELLED
Start: 2021-05-17

## 2021-05-13 RX ORDER — DIPHENHYDRAMINE HYDROCHLORIDE 50 MG/ML
50 INJECTION INTRAMUSCULAR; INTRAVENOUS
Status: CANCELLED
Start: 2021-05-17

## 2021-05-13 RX ORDER — ALBUTEROL SULFATE 0.83 MG/ML
2.5 SOLUTION RESPIRATORY (INHALATION)
Status: CANCELLED | OUTPATIENT
Start: 2021-06-11

## 2021-05-13 RX ORDER — HEPARIN SODIUM (PORCINE) LOCK FLUSH IV SOLN 100 UNIT/ML 100 UNIT/ML
5 SOLUTION INTRAVENOUS
Status: CANCELLED | OUTPATIENT
Start: 2021-06-04

## 2021-05-13 RX ORDER — MEPERIDINE HYDROCHLORIDE 25 MG/ML
25 INJECTION INTRAMUSCULAR; INTRAVENOUS; SUBCUTANEOUS EVERY 30 MIN PRN
Status: CANCELLED | OUTPATIENT
Start: 2021-06-11

## 2021-05-13 RX ORDER — ACETAMINOPHEN 325 MG/1
650 TABLET ORAL ONCE
Status: CANCELLED | OUTPATIENT
Start: 2021-06-04

## 2021-05-13 RX ORDER — NALOXONE HYDROCHLORIDE 0.4 MG/ML
.1-.4 INJECTION, SOLUTION INTRAMUSCULAR; INTRAVENOUS; SUBCUTANEOUS
Status: CANCELLED | OUTPATIENT
Start: 2021-06-04

## 2021-05-13 RX ORDER — MONTELUKAST SODIUM 10 MG/1
10 TABLET ORAL ONCE
Status: CANCELLED | OUTPATIENT
Start: 2021-06-04

## 2021-05-13 RX ORDER — SODIUM CHLORIDE 9 MG/ML
1000 INJECTION, SOLUTION INTRAVENOUS CONTINUOUS PRN
Status: CANCELLED
Start: 2021-06-11

## 2021-05-13 RX ORDER — METHYLPREDNISOLONE SODIUM SUCCINATE 125 MG/2ML
125 INJECTION, POWDER, LYOPHILIZED, FOR SOLUTION INTRAMUSCULAR; INTRAVENOUS
Status: CANCELLED
Start: 2021-06-04

## 2021-05-13 RX ORDER — HEPARIN SODIUM,PORCINE 10 UNIT/ML
5 VIAL (ML) INTRAVENOUS
Status: CANCELLED | OUTPATIENT
Start: 2021-05-17

## 2021-05-13 RX ORDER — LORAZEPAM 2 MG/ML
0.5 INJECTION INTRAMUSCULAR EVERY 4 HOURS PRN
Status: CANCELLED
Start: 2021-06-04

## 2021-05-13 RX ORDER — LORAZEPAM 2 MG/ML
0.5 INJECTION INTRAMUSCULAR EVERY 4 HOURS PRN
Status: CANCELLED
Start: 2021-06-11

## 2021-05-13 RX ORDER — DIPHENHYDRAMINE HCL 25 MG
50 CAPSULE ORAL ONCE
Status: CANCELLED | OUTPATIENT
Start: 2021-05-17

## 2021-05-13 RX ORDER — ALBUTEROL SULFATE 90 UG/1
1-2 AEROSOL, METERED RESPIRATORY (INHALATION)
Status: CANCELLED
Start: 2021-05-17

## 2021-05-13 RX ORDER — MONTELUKAST SODIUM 10 MG/1
10 TABLET ORAL ONCE
Status: CANCELLED | OUTPATIENT
Start: 2021-05-17

## 2021-05-13 RX ORDER — ALBUTEROL SULFATE 90 UG/1
1-2 AEROSOL, METERED RESPIRATORY (INHALATION)
Status: CANCELLED
Start: 2021-06-11

## 2021-05-13 RX ORDER — ALBUTEROL SULFATE 0.83 MG/ML
2.5 SOLUTION RESPIRATORY (INHALATION)
Status: CANCELLED | OUTPATIENT
Start: 2021-06-04

## 2021-05-13 RX ORDER — SODIUM CHLORIDE 9 MG/ML
1000 INJECTION, SOLUTION INTRAVENOUS CONTINUOUS PRN
Status: CANCELLED
Start: 2021-05-17

## 2021-05-13 RX ORDER — DEXAMETHASONE 4 MG/1
20 TABLET ORAL ONCE
Status: CANCELLED | OUTPATIENT
Start: 2021-05-17

## 2021-05-13 RX ORDER — ACETAMINOPHEN 325 MG/1
650 TABLET ORAL ONCE
Status: CANCELLED | OUTPATIENT
Start: 2021-06-11

## 2021-05-13 RX ORDER — HEPARIN SODIUM,PORCINE 10 UNIT/ML
5 VIAL (ML) INTRAVENOUS
Status: CANCELLED | OUTPATIENT
Start: 2021-06-11

## 2021-05-13 RX ORDER — ACETAMINOPHEN 325 MG/1
650 TABLET ORAL ONCE
Status: CANCELLED | OUTPATIENT
Start: 2021-05-17

## 2021-05-13 RX ORDER — DIPHENHYDRAMINE HYDROCHLORIDE 50 MG/ML
50 INJECTION INTRAMUSCULAR; INTRAVENOUS
Status: CANCELLED
Start: 2021-06-04

## 2021-05-13 RX ORDER — EPINEPHRINE 1 MG/ML
0.3 INJECTION, SOLUTION INTRAMUSCULAR; SUBCUTANEOUS EVERY 5 MIN PRN
Status: CANCELLED | OUTPATIENT
Start: 2021-06-04

## 2021-05-13 RX ORDER — ALBUTEROL SULFATE 90 UG/1
1-2 AEROSOL, METERED RESPIRATORY (INHALATION)
Status: CANCELLED
Start: 2021-06-04

## 2021-05-13 RX ORDER — ALBUTEROL SULFATE 0.83 MG/ML
2.5 SOLUTION RESPIRATORY (INHALATION)
Status: CANCELLED | OUTPATIENT
Start: 2021-05-17

## 2021-05-13 RX ORDER — SODIUM CHLORIDE 9 MG/ML
1000 INJECTION, SOLUTION INTRAVENOUS CONTINUOUS PRN
Status: CANCELLED
Start: 2021-06-04

## 2021-05-13 RX ORDER — NALOXONE HYDROCHLORIDE 0.4 MG/ML
.1-.4 INJECTION, SOLUTION INTRAMUSCULAR; INTRAVENOUS; SUBCUTANEOUS
Status: CANCELLED | OUTPATIENT
Start: 2021-05-17

## 2021-05-13 RX ORDER — DIPHENHYDRAMINE HCL 25 MG
50 CAPSULE ORAL ONCE
Status: CANCELLED | OUTPATIENT
Start: 2021-06-11

## 2021-05-13 RX ORDER — EPINEPHRINE 1 MG/ML
0.3 INJECTION, SOLUTION INTRAMUSCULAR; SUBCUTANEOUS EVERY 5 MIN PRN
Status: CANCELLED | OUTPATIENT
Start: 2021-05-17

## 2021-05-13 RX ORDER — METHYLPREDNISOLONE SODIUM SUCCINATE 125 MG/2ML
125 INJECTION, POWDER, LYOPHILIZED, FOR SOLUTION INTRAMUSCULAR; INTRAVENOUS
Status: CANCELLED
Start: 2021-06-11

## 2021-05-13 RX ORDER — HEPARIN SODIUM,PORCINE 10 UNIT/ML
5 VIAL (ML) INTRAVENOUS
Status: CANCELLED | OUTPATIENT
Start: 2021-06-04

## 2021-05-13 RX ORDER — DIPHENHYDRAMINE HCL 25 MG
50 CAPSULE ORAL ONCE
Status: CANCELLED | OUTPATIENT
Start: 2021-06-04

## 2021-05-13 RX ORDER — EPINEPHRINE 1 MG/ML
0.3 INJECTION, SOLUTION INTRAMUSCULAR; SUBCUTANEOUS EVERY 5 MIN PRN
Status: CANCELLED | OUTPATIENT
Start: 2021-06-11

## 2021-05-13 NOTE — TELEPHONE ENCOUNTER
PA Initiation    Medication: Revlimid 25mg PA Pending  Insurance Company: [employer] - Phone [phone] Fax [phone]  Pharmacy Filling the Rx: [facility] - [place], [place] - [address]  Filling Pharmacy Phone:    Filling Pharmacy Fax:    Start Date: 5/13/2021    Brice Persaud CPhT  [facility]  Oncology Pharmacy [email or web address]  [phone] (phone  [phone] (fax

## 2021-05-14 ENCOUNTER — DOCUMENTATION ONLY (OUTPATIENT)
Dept: ONCOLOGY | Facility: CLINIC | Age: 51
End: 2021-05-14

## 2021-05-14 NOTE — TELEPHONE ENCOUNTER
Prior Authorization Approval    Authorization Effective Date:    Authorization Expiration Date:    Medication: Revlimid 25mg PA Approved  Approved Dose/Quantity: 14/21  Reference #: [id number]   Insurance Company: [employer] - Phone [phone] Fax [phone]  Expected CoPay:       CoPay Card Available:      Foundation Assistance Needed:    Which Pharmacy is filling the prescription (Not needed for infusion/clinic administered): [facility] - [place], [place] - [address] [place] [address]  Pharmacy Notified:    Patient Notified:      Brice Persaud CPhT  [facility]  Oncology Pharmacy [email or web address]  [phone] (phone  [phone] (fax

## 2021-05-16 NOTE — PROGRESS NOTES
"June is a 50 year old who is being evaluated via a billable video visit.      How would you like to obtain your AVS? MyChart  If the video visit is dropped, the invitation should be resent by: Send to e-mail at: [email or web address]  Will anyone else be joining your video visit? No       I have reviewed and updated the patient's allergies and medication list.    Concerns: none  Refills: none     Vitals - Patient Reported  Weight (Patient Reported): 79.4 kg (175 lb)  Height (Patient Reported): 165.1 cm (5' 5\")  BMI (Based on Pt Reported Ht/Wt): 29.12  Pain Score: Moderate Pain (4)  Pain Loc: Mid Back    Kathy Xavier CMA        Video Start Time: 3:58 PM  Video-Visit Details    Type of service:  Video Visit    Video End Time:4:27 PM    Originating Location (pt. Location): Home    Distant Location (provider location):  [facility]     Platform used for Video Visit: [facility]      Oncologic Hx:   - 4/30/2021 M spike of 34.8 in urine.M spike in blood 0.2; IgG 702 with depressed IgA and IgM. Beta 2 microglobulin 2.7. Lambda  with K/L ratio of 0.01. WBC 11.1 with absolute eos of 1.0. hgb, plt, Cr (1.1), and albumin WNL.    -4/30/2021 CT abd/pelvis showed sclerotic marrow changes with numerous lytic appearing lesions throughout the imaged portions of the spine, pelvis and ribs.      -PET scan 5/11/2021 Numerous osteolytic lesions which predominantly demonstrate uptake below liver background levels. There is one intraosseous lesion in the left lateral 9th rib that demonstrates uptake above background, possibly due to nondisplaced fracture. Adjacent to this lesion is mild  hypermetabolism to the left pleura, with new small left pleural effusion, suspicious for malignant effusion versus posttraumatic effusion. Pleural uptake may represent extramedullary myeloma extending along the intercostal space versus inflammation.    - BM bx 5/17/2021 with flow showing 4.0% plasma cells which express " CD19 (dim), CD38 (bright), CD45 (dim), , and monotypic cytoplasmic lambda immunoglobulin light chains but lack CD20 and CD56.  Hypercellular bone marrow for age (approximately 75% cellularity) with   adequate trilineage hematopoiesis. Plasma cell infiltrate: Interstitial, lambda monotypic and representing approximately 60% the bone marrow cellularity, by  immunohistochemical stain.       Interval Hx: Logan's ribs are feeling better after a four day pulse of dexamethasone. He is still needing tylenol though. He continues to work. No new bone pains or other symptoms.    A comprehensive review of systems was completed and negative except as described above.       Physical Exam:   General: NAD  HEENT: no scleral icterus  Resp: nonlabored breathing, no cough  Skin: no rashes observed  Neuro: alert, conversant, no facial droop  Psych: appropriate mood and affect      The rest of a comprehensive physical examination is deferred due to [facility] Emergency telephone visit restrictions      Assessment and Plan  Logan has multiple myeloma of unknown risk at this time as his cytogenetics and FISH are pending. He does have significant bone marrow and cortical bone involvement so I will treat him with Miracle-RVd (21 day cycle). We discussed that possible side effects include infections, low blood counts, blood clots, shingles reactivation, secondary cancers, mood disturbances, hyperglycemia, neuropathy, and insomnia. We discussed the regimen at length and will plan to start without including day 4 and day 11 velcade to minimize infusion appointments as this are difficult for him with work. I told him that if his cytogenetics come back high risk, than we should incorporate the day 4 and day 11 velcade to get improved disease control. We will also do this if we don't see rapid improvement in his myeloma labs. We did not discuss this at today's visit but he would be a good transplant candidate so will plan for induction  followed by BMT consult.     Levofloxacin 250 mg every day,  mg every day, and acyclovir 400 mg BID for prophylaxis      Left nondisplaced rib fracture:  His Vit D was checked in January and was WNL but given his bone lesions, he will increase his supplement to 10,000 international unit(s). Also we discussed zometa and that the goal is to do 2 years of monthly infusions. He will work on getting his dental work completed so we can start this. He will continue tylenol for pain right now but will let me know if he feels he needs something stronger for pain control.     Barbi Vazquez MD PhD

## 2021-05-17 ENCOUNTER — HOSPITAL ENCOUNTER (OUTPATIENT)
Facility: CLINIC | Age: 51
Discharge: HOME | End: 2021-05-17
Attending: PATHOLOGY | Admitting: PATHOLOGY
Payer: COMMERCIAL

## 2021-05-17 ENCOUNTER — TELEPHONE (OUTPATIENT)
Dept: ONCOLOGY | Facility: CLINIC | Age: 51
End: 2021-05-17

## 2021-05-17 ENCOUNTER — ANESTHESIA EVENT (OUTPATIENT)
Dept: SURGERY | Facility: CLINIC | Age: 51
End: 2021-05-17
Payer: COMMERCIAL

## 2021-05-17 ENCOUNTER — TRANSFERRED RECORDS (OUTPATIENT)
Dept: HEALTH INFORMATION MANAGEMENT | Facility: CLINIC | Age: 51
End: 2021-05-17

## 2021-05-17 ENCOUNTER — ANESTHESIA (OUTPATIENT)
Dept: SURGERY | Facility: CLINIC | Age: 51
End: 2021-05-17
Payer: COMMERCIAL

## 2021-05-17 VITALS
OXYGEN SATURATION: 99 % | HEART RATE: 81 BPM | SYSTOLIC BLOOD PRESSURE: 150 MMHG | DIASTOLIC BLOOD PRESSURE: 94 MMHG | WEIGHT: 175 LBS | HEIGHT: 65 IN | RESPIRATION RATE: 15 BRPM | TEMPERATURE: 97.9 F | BODY MASS INDEX: 29.16 KG/M2

## 2021-05-17 DIAGNOSIS — C90.00 MULTIPLE MYELOMA NOT HAVING ACHIEVED REMISSION (H): Primary | ICD-10-CM

## 2021-05-17 DIAGNOSIS — Z79.899 ENCOUNTER FOR LONG-TERM (CURRENT) USE OF MEDICATIONS: ICD-10-CM

## 2021-05-17 DIAGNOSIS — C90.00 MULTIPLE MYELOMA NOT HAVING ACHIEVED REMISSION (H): ICD-10-CM

## 2021-05-17 LAB
BASOPHILS # BLD AUTO: 0 10E9/L (ref 0–0.2)
BASOPHILS NFR BLD AUTO: 0.1 %
DIFFERENTIAL METHOD BLD: ABNORMAL
EOSINOPHIL # BLD AUTO: 0 10E9/L (ref 0–0.7)
EOSINOPHIL NFR BLD AUTO: 0 %
ERYTHROCYTE [DISTWIDTH] IN BLOOD BY AUTOMATED COUNT: 14.7 % (ref 10–15)
HCT VFR BLD AUTO: 38.7 % (ref 40–53)
HGB BLD-MCNC: 13 G/DL (ref 13.3–17.7)
IMM GRANULOCYTES # BLD: 0.2 10E9/L (ref 0–0.4)
IMM GRANULOCYTES NFR BLD: 0.8 %
LYMPHOCYTES # BLD AUTO: 1.8 10E9/L (ref 0.8–5.3)
LYMPHOCYTES NFR BLD AUTO: 9.4 %
MCH RBC QN AUTO: 30.6 PG (ref 26.5–33)
MCHC RBC AUTO-ENTMCNC: 33.6 G/DL (ref 31.5–36.5)
MCV RBC AUTO: 91 FL (ref 78–100)
MONOCYTES # BLD AUTO: 1.7 10E9/L (ref 0–1.3)
MONOCYTES NFR BLD AUTO: 8.9 %
NEUTROPHILS # BLD AUTO: 15.2 10E9/L (ref 1.6–8.3)
NEUTROPHILS NFR BLD AUTO: 80.8 %
NRBC # BLD AUTO: 0 10*3/UL
NRBC BLD AUTO-RTO: 0 /100
PLATELET # BLD AUTO: 462 10E9/L (ref 150–450)
RBC # BLD AUTO: 4.25 10E12/L (ref 4.4–5.9)
RETICS # AUTO: 109.7 10E9/L (ref 25–95)
RETICS/RBC NFR AUTO: 2.6 % (ref 0.5–2)
WBC # BLD AUTO: 18.8 10E9/L (ref 4–11)

## 2021-05-17 PROCEDURE — 999N001002 HC STATISTIC ADDL BM ASP PERF PF 38220: Performed by: INTERNAL MEDICINE

## 2021-05-17 PROCEDURE — 88305 TISSUE EXAM BY PATHOLOGIST: CPT | Mod: 26 | Performed by: PATHOLOGY

## 2021-05-17 PROCEDURE — 258N000003 HC RX IP 258 OP 636: Performed by: NURSE ANESTHETIST, CERTIFIED REGISTERED

## 2021-05-17 PROCEDURE — 38220 DX BONE MARROW ASPIRATIONS: CPT | Mod: 59 | Performed by: PATHOLOGY

## 2021-05-17 PROCEDURE — 272N000001 HC OR GENERAL SUPPLY STERILE: Performed by: PATHOLOGY

## 2021-05-17 PROCEDURE — 85045 AUTOMATED RETICULOCYTE COUNT: CPT | Performed by: PATHOLOGY

## 2021-05-17 PROCEDURE — 88280 CHROMOSOME KARYOTYPE STUDY: CPT | Performed by: INTERNAL MEDICINE

## 2021-05-17 PROCEDURE — 370N000017 HC ANESTHESIA TECHNICAL FEE, PER MIN: Performed by: PATHOLOGY

## 2021-05-17 PROCEDURE — 88341 IMHCHEM/IMCYTCHM EA ADD ANTB: CPT | Mod: TC | Performed by: INTERNAL MEDICINE

## 2021-05-17 PROCEDURE — 999N001010 HC STATISTIC BONE MARROW ASP PERF TC 38220: Performed by: INTERNAL MEDICINE

## 2021-05-17 PROCEDURE — 250N000009 HC RX 250: Performed by: PATHOLOGY

## 2021-05-17 PROCEDURE — 999N001020 HC STATISTIC H-SEND OUTS PREP: Performed by: INTERNAL MEDICINE

## 2021-05-17 PROCEDURE — 85060 BLOOD SMEAR INTERPRETATION: CPT | Performed by: PATHOLOGY

## 2021-05-17 PROCEDURE — 999N001137 HC STATISTIC FLOW >15 ABY TC 88189: Performed by: PATHOLOGY

## 2021-05-17 PROCEDURE — 88311 DECALCIFY TISSUE: CPT | Mod: TC | Performed by: INTERNAL MEDICINE

## 2021-05-17 PROCEDURE — 999N001124 HC STATISTIC BONE MARROW ASP TC 85097: Performed by: INTERNAL MEDICINE

## 2021-05-17 PROCEDURE — 36415 COLL VENOUS BLD VENIPUNCTURE: CPT | Performed by: PATHOLOGY

## 2021-05-17 PROCEDURE — 360N000075 HC SURGERY LEVEL 2, PER MIN: Performed by: PATHOLOGY

## 2021-05-17 PROCEDURE — 88275 CYTOGENETICS 100-300: CPT | Performed by: INTERNAL MEDICINE

## 2021-05-17 PROCEDURE — 88271 CYTOGENETICS DNA PROBE: CPT | Performed by: INTERNAL MEDICINE

## 2021-05-17 PROCEDURE — 88342 IMHCHEM/IMCYTCHM 1ST ANTB: CPT | Mod: 26 | Performed by: PATHOLOGY

## 2021-05-17 PROCEDURE — 88311 DECALCIFY TISSUE: CPT | Mod: 26 | Performed by: PATHOLOGY

## 2021-05-17 PROCEDURE — 250N000011 HC RX IP 250 OP 636: Performed by: NURSE ANESTHETIST, CERTIFIED REGISTERED

## 2021-05-17 PROCEDURE — 999N001109 HC STATISTIC MORPHOLOGY W/INTERP HISTOLOGY TC 85060: Performed by: INTERNAL MEDICINE

## 2021-05-17 PROCEDURE — 88313 SPECIAL STAINS GROUP 2: CPT | Mod: 26 | Performed by: PATHOLOGY

## 2021-05-17 PROCEDURE — 999N000141 HC STATISTIC PRE-PROCEDURE NURSING ASSESSMENT: Performed by: PATHOLOGY

## 2021-05-17 PROCEDURE — 88305 TISSUE EXAM BY PATHOLOGIST: CPT | Mod: TC | Performed by: INTERNAL MEDICINE

## 2021-05-17 PROCEDURE — 88184 FLOWCYTOMETRY/ TC 1 MARKER: CPT | Performed by: PATHOLOGY

## 2021-05-17 PROCEDURE — 88342 IMHCHEM/IMCYTCHM 1ST ANTB: CPT | Mod: TC | Performed by: INTERNAL MEDICINE

## 2021-05-17 PROCEDURE — 88264 CHROMOSOME ANALYSIS 20-25: CPT | Performed by: INTERNAL MEDICINE

## 2021-05-17 PROCEDURE — 88341 IMHCHEM/IMCYTCHM EA ADD ANTB: CPT | Mod: 26 | Performed by: PATHOLOGY

## 2021-05-17 PROCEDURE — 999N001068 HC STATISTIC BONE MARROW CORE PERF TC 38221: Performed by: INTERNAL MEDICINE

## 2021-05-17 PROCEDURE — 999N001102 HC STATISTIC DNA PROCESS AND HOLD: Performed by: PATHOLOGY

## 2021-05-17 PROCEDURE — 250N000009 HC RX 250: Performed by: NURSE ANESTHETIST, CERTIFIED REGISTERED

## 2021-05-17 PROCEDURE — 88189 FLOWCYTOMETRY/READ 16 & >: CPT | Performed by: PATHOLOGY

## 2021-05-17 PROCEDURE — 88185 FLOWCYTOMETRY/TC ADD-ON: CPT | Performed by: PATHOLOGY

## 2021-05-17 PROCEDURE — 710N000012 HC RECOVERY PHASE 2, PER MINUTE: Performed by: PATHOLOGY

## 2021-05-17 PROCEDURE — 88291 CYTO/MOLECULAR REPORT: CPT | Performed by: MEDICAL GENETICS

## 2021-05-17 PROCEDURE — 88313 SPECIAL STAINS GROUP 2: CPT | Mod: TC | Performed by: INTERNAL MEDICINE

## 2021-05-17 PROCEDURE — 88237 TISSUE CULTURE BONE MARROW: CPT | Performed by: INTERNAL MEDICINE

## 2021-05-17 PROCEDURE — 38221 DX BONE MARROW BIOPSIES: CPT | Performed by: PATHOLOGY

## 2021-05-17 PROCEDURE — 85097 BONE MARROW INTERPRETATION: CPT | Performed by: PATHOLOGY

## 2021-05-17 PROCEDURE — 85025 COMPLETE CBC W/AUTO DIFF WBC: CPT | Performed by: PATHOLOGY

## 2021-05-17 RX ORDER — FLUMAZENIL 0.1 MG/ML
0.2 INJECTION, SOLUTION INTRAVENOUS
Status: DISCONTINUED | OUTPATIENT
Start: 2021-05-17 | End: 2021-05-17 | Stop reason: HOSPADM

## 2021-05-17 RX ORDER — KETAMINE HYDROCHLORIDE 10 MG/ML
INJECTION, SOLUTION INTRAMUSCULAR; INTRAVENOUS PRN
Status: DISCONTINUED | OUTPATIENT
Start: 2021-05-17 | End: 2021-05-17

## 2021-05-17 RX ORDER — LIDOCAINE HYDROCHLORIDE 10 MG/ML
INJECTION, SOLUTION EPIDURAL; INFILTRATION; INTRACAUDAL; PERINEURAL PRN
Status: DISCONTINUED | OUTPATIENT
Start: 2021-05-17 | End: 2021-05-17 | Stop reason: HOSPADM

## 2021-05-17 RX ORDER — NALOXONE HYDROCHLORIDE 0.4 MG/ML
0.2 INJECTION, SOLUTION INTRAMUSCULAR; INTRAVENOUS; SUBCUTANEOUS
Status: DISCONTINUED | OUTPATIENT
Start: 2021-05-17 | End: 2021-05-17 | Stop reason: HOSPADM

## 2021-05-17 RX ORDER — METOCLOPRAMIDE 10 MG/1
10 TABLET ORAL EVERY 6 HOURS PRN
Status: DISCONTINUED | OUTPATIENT
Start: 2021-05-17 | End: 2021-05-17 | Stop reason: HOSPADM

## 2021-05-17 RX ORDER — PROPOFOL 10 MG/ML
INJECTION, EMULSION INTRAVENOUS CONTINUOUS PRN
Status: DISCONTINUED | OUTPATIENT
Start: 2021-05-17 | End: 2021-05-17

## 2021-05-17 RX ORDER — PROPOFOL 10 MG/ML
INJECTION, EMULSION INTRAVENOUS PRN
Status: DISCONTINUED | OUTPATIENT
Start: 2021-05-17 | End: 2021-05-17

## 2021-05-17 RX ORDER — NALOXONE HYDROCHLORIDE 0.4 MG/ML
0.4 INJECTION, SOLUTION INTRAMUSCULAR; INTRAVENOUS; SUBCUTANEOUS
Status: DISCONTINUED | OUTPATIENT
Start: 2021-05-17 | End: 2021-05-17 | Stop reason: HOSPADM

## 2021-05-17 RX ORDER — LABETALOL HYDROCHLORIDE 5 MG/ML
10 INJECTION, SOLUTION INTRAVENOUS
Status: DISCONTINUED | OUTPATIENT
Start: 2021-05-17 | End: 2021-05-17 | Stop reason: HOSPADM

## 2021-05-17 RX ORDER — LIDOCAINE HYDROCHLORIDE 10 MG/ML
8-10 INJECTION, SOLUTION EPIDURAL; INFILTRATION; INTRACAUDAL; PERINEURAL
Status: DISCONTINUED | OUTPATIENT
Start: 2021-05-17 | End: 2021-05-17 | Stop reason: HOSPADM

## 2021-05-17 RX ORDER — SODIUM CHLORIDE, SODIUM LACTATE, POTASSIUM CHLORIDE, CALCIUM CHLORIDE 600; 310; 30; 20 MG/100ML; MG/100ML; MG/100ML; MG/100ML
INJECTION, SOLUTION INTRAVENOUS CONTINUOUS PRN
Status: DISCONTINUED | OUTPATIENT
Start: 2021-05-17 | End: 2021-05-17

## 2021-05-17 RX ORDER — PROCHLORPERAZINE MALEATE 10 MG
10 TABLET ORAL EVERY 6 HOURS PRN
Qty: 30 TABLET | Refills: 5 | Status: SHIPPED | OUTPATIENT
Start: 2021-05-17 | End: 2022-01-25

## 2021-05-17 RX ORDER — FENTANYL CITRATE 50 UG/ML
25-50 INJECTION, SOLUTION INTRAMUSCULAR; INTRAVENOUS
Status: DISCONTINUED | OUTPATIENT
Start: 2021-05-17 | End: 2021-05-17 | Stop reason: HOSPADM

## 2021-05-17 RX ORDER — LIDOCAINE 40 MG/G
CREAM TOPICAL
Status: DISCONTINUED | OUTPATIENT
Start: 2021-05-17 | End: 2021-05-17 | Stop reason: HOSPADM

## 2021-05-17 RX ORDER — HYDROMORPHONE HYDROCHLORIDE 1 MG/ML
.3-.5 INJECTION, SOLUTION INTRAMUSCULAR; INTRAVENOUS; SUBCUTANEOUS EVERY 10 MIN PRN
Status: DISCONTINUED | OUTPATIENT
Start: 2021-05-17 | End: 2021-05-17 | Stop reason: HOSPADM

## 2021-05-17 RX ORDER — GLYCOPYRROLATE 0.2 MG/ML
INJECTION, SOLUTION INTRAMUSCULAR; INTRAVENOUS PRN
Status: DISCONTINUED | OUTPATIENT
Start: 2021-05-17 | End: 2021-05-17

## 2021-05-17 RX ORDER — ACYCLOVIR 400 MG/1
400 TABLET ORAL 2 TIMES DAILY
Qty: 60 TABLET | Refills: 11 | Status: SHIPPED | OUTPATIENT
Start: 2021-05-17 | End: 2021-09-20

## 2021-05-17 RX ORDER — KETOROLAC TROMETHAMINE 30 MG/ML
30 INJECTION, SOLUTION INTRAMUSCULAR; INTRAVENOUS EVERY 6 HOURS PRN
Status: DISCONTINUED | OUTPATIENT
Start: 2021-05-17 | End: 2021-05-17 | Stop reason: HOSPADM

## 2021-05-17 RX ORDER — NALOXONE HYDROCHLORIDE 0.4 MG/ML
0.2 INJECTION, SOLUTION INTRAMUSCULAR; INTRAVENOUS; SUBCUTANEOUS
Status: DISCONTINUED | OUTPATIENT
Start: 2021-05-17 | End: 2021-05-17

## 2021-05-17 RX ORDER — ONDANSETRON 4 MG/1
4 TABLET, ORALLY DISINTEGRATING ORAL EVERY 30 MIN PRN
Status: DISCONTINUED | OUTPATIENT
Start: 2021-05-17 | End: 2021-05-17 | Stop reason: HOSPADM

## 2021-05-17 RX ORDER — LENALIDOMIDE 25 MG/1
25 CAPSULE ORAL DAILY
Qty: 14 CAPSULE | Refills: 0 | Status: SHIPPED | OUTPATIENT
Start: 2021-05-17 | End: 2021-06-14

## 2021-05-17 RX ORDER — NALOXONE HYDROCHLORIDE 0.4 MG/ML
0.4 INJECTION, SOLUTION INTRAMUSCULAR; INTRAVENOUS; SUBCUTANEOUS
Status: DISCONTINUED | OUTPATIENT
Start: 2021-05-17 | End: 2021-05-17

## 2021-05-17 RX ORDER — DIMENHYDRINATE 50 MG/ML
25 INJECTION, SOLUTION INTRAMUSCULAR; INTRAVENOUS
Status: DISCONTINUED | OUTPATIENT
Start: 2021-05-17 | End: 2021-05-17 | Stop reason: HOSPADM

## 2021-05-17 RX ORDER — METOCLOPRAMIDE HYDROCHLORIDE 5 MG/ML
10 INJECTION INTRAMUSCULAR; INTRAVENOUS EVERY 6 HOURS PRN
Status: DISCONTINUED | OUTPATIENT
Start: 2021-05-17 | End: 2021-05-17 | Stop reason: HOSPADM

## 2021-05-17 RX ORDER — SODIUM CHLORIDE, SODIUM LACTATE, POTASSIUM CHLORIDE, CALCIUM CHLORIDE 600; 310; 30; 20 MG/100ML; MG/100ML; MG/100ML; MG/100ML
INJECTION, SOLUTION INTRAVENOUS CONTINUOUS
Status: DISCONTINUED | OUTPATIENT
Start: 2021-05-17 | End: 2021-05-17 | Stop reason: HOSPADM

## 2021-05-17 RX ORDER — MEPERIDINE HYDROCHLORIDE 25 MG/ML
12.5 INJECTION INTRAMUSCULAR; INTRAVENOUS; SUBCUTANEOUS
Status: DISCONTINUED | OUTPATIENT
Start: 2021-05-17 | End: 2021-05-17 | Stop reason: HOSPADM

## 2021-05-17 RX ORDER — ONDANSETRON 2 MG/ML
4 INJECTION INTRAMUSCULAR; INTRAVENOUS EVERY 30 MIN PRN
Status: DISCONTINUED | OUTPATIENT
Start: 2021-05-17 | End: 2021-05-17 | Stop reason: HOSPADM

## 2021-05-17 RX ORDER — HYDRALAZINE HYDROCHLORIDE 20 MG/ML
2.5-5 INJECTION INTRAMUSCULAR; INTRAVENOUS EVERY 10 MIN PRN
Status: DISCONTINUED | OUTPATIENT
Start: 2021-05-17 | End: 2021-05-17 | Stop reason: HOSPADM

## 2021-05-17 RX ADMIN — GLYCOPYRROLATE 0.2 MG: 0.2 INJECTION, SOLUTION INTRAMUSCULAR; INTRAVENOUS at 10:12

## 2021-05-17 RX ADMIN — KETAMINE HYDROCHLORIDE 20 MG: 10 INJECTION, SOLUTION INTRAMUSCULAR; INTRAVENOUS at 10:17

## 2021-05-17 RX ADMIN — PROPOFOL 20 MG: 10 INJECTION, EMULSION INTRAVENOUS at 10:16

## 2021-05-17 RX ADMIN — KETAMINE HYDROCHLORIDE 10 MG: 10 INJECTION, SOLUTION INTRAMUSCULAR; INTRAVENOUS at 10:25

## 2021-05-17 RX ADMIN — PROPOFOL 100 MCG/KG/MIN: 10 INJECTION, EMULSION INTRAVENOUS at 10:17

## 2021-05-17 RX ADMIN — KETAMINE HYDROCHLORIDE 10 MG: 10 INJECTION, SOLUTION INTRAMUSCULAR; INTRAVENOUS at 10:22

## 2021-05-17 RX ADMIN — KETAMINE HYDROCHLORIDE 10 MG: 10 INJECTION, SOLUTION INTRAMUSCULAR; INTRAVENOUS at 10:28

## 2021-05-17 RX ADMIN — SODIUM CHLORIDE, POTASSIUM CHLORIDE, SODIUM LACTATE AND CALCIUM CHLORIDE: 600; 310; 30; 20 INJECTION, SOLUTION INTRAVENOUS at 10:12

## 2021-05-17 ASSESSMENT — MIFFLIN-ST. JEOR: SCORE: 1570.67

## 2021-05-17 NOTE — ANESTHESIA PREPROCEDURE EVALUATION
Anesthesia Pre-Procedure Evaluation    Patient: June Ronquillo   MRN: [id number] : 1970        Preoperative Diagnosis: Multiple myeloma (H) [C90.00]   Procedure : Procedure(s):  BIOPSY, BONE MARROW with aspiration and ancillary studies     Past Medical History:   Diagnosis Date     CAD (coronary artery disease)     s/p stent to CFX     Heart attack (H)      Hyperlipidemia LDL goal <100      Hypertension      Stented coronary artery      Thyroid disease       Past Surgical History:   Procedure Laterality Date     HEART CATH PTCA SINGLE VESSEL  10/10/2004    Stent to CFX - [facility]       Allergies   Allergen Reactions     Nkda [No Known Drug Allergies]       Social History     Tobacco Use     Smoking status: Never Smoker     Smokeless tobacco: Never Used   Substance Use Topics     Alcohol use: Not Currently      Wt Readings from Last 1 Encounters:   21 79.4 kg (175 lb)        Anesthesia Evaluation   Pt has had prior anesthetic. Type: General.        ROS/MED HX  ENT/Pulmonary:  - neg pulmonary ROS     Neurologic:  - neg neurologic ROS     Cardiovascular: Comment: s/p stent to CFX    (+) Dyslipidemia hypertension--CAD --stent-Drug Eluting Stent.     METS/Exercise Tolerance:     Hematologic:  - neg hematologic  ROS     Musculoskeletal:       GI/Hepatic:  - neg GI/hepatic ROS     Renal/Genitourinary:  - neg Renal ROS     Endo:     (+) thyroid problem,  Thyroid disease - Other,     Psychiatric/Substance Use:  - neg psychiatric ROS     Infectious Disease:  - neg infectious disease ROS     Malignancy:  - neg malignancy ROS     Other:  - neg other ROS          Physical Exam    Airway        Mallampati: I   TM distance: > 3 FB   Neck ROM: full   Mouth opening: > 3 cm    Respiratory Devices and Support         Dental  no notable dental history         Cardiovascular   cardiovascular exam normal          Pulmonary   pulmonary exam normal                OUTSIDE LABS:  CBC:   Lab Results   Component  Value Date    WBC 18.8 (H) 05/17/2021    WBC 11.1 (H) 04/30/2021    HGB 13.0 (L) 05/17/2021    HGB 14.0 04/30/2021    HCT 38.7 (L) 05/17/2021    HCT 41.0 04/30/2021     (H) 05/17/2021     04/30/2021     BMP:   Lab Results   Component Value Date     05/03/2021     04/30/2021    POTASSIUM 4.0 05/03/2021    POTASSIUM 3.7 04/30/2021    CHLORIDE 107 05/03/2021    CHLORIDE 104 04/30/2021    CO2 26 05/03/2021    CO2 27 04/30/2021    BUN 15 05/03/2021    BUN 17 04/30/2021    CR 1.01 05/03/2021    CR 1.07 04/30/2021     (H) 05/03/2021     (H) 04/30/2021     COAGS: No results found for: PTT, INR, FIBR  POC: No results found for: BGM, HCG, HCGS  HEPATIC:   Lab Results   Component Value Date    ALBUMIN 4.0 04/30/2021    PROTTOTAL 7.1 04/30/2021    ALT 26 04/30/2021    AST 44 04/30/2021    ALKPHOS 62 04/30/2021    BILITOTAL 0.4 04/30/2021     OTHER:   Lab Results   Component Value Date    A1C 6.0 (H) 01/25/2021    HARDEEP 9.6 05/03/2021    TSH 3.96 05/03/2021    T4 0.78 05/03/2021    CRP <2.9 04/30/2021       Anesthesia Plan    ASA Status:  2      Anesthesia Type: MAC.     - Reason for MAC: immobility needed   Induction: Propofol.   Maintenance: TIVA.        Consents    Anesthesia Plan(s) and associated risks, benefits, and realistic alternatives discussed. Questions answered and patient/representative(s) expressed understanding.     - Discussed with:  Patient      - Extended Intubation/Ventilatory Support Discussed: No.      - Patient is DNR/DNI Status: No    Use of blood products discussed: No .     Postoperative Care    Pain management: IV analgesics.   PONV prophylaxis: Ondansetron (or other 5HT-3), Dexamethasone or Solumedrol     Comments:                Dominguez Abrams MD

## 2021-05-17 NOTE — ANESTHESIA CARE TRANSFER NOTE
Patient: June Ronquillo    Procedure(s):  BIOPSY, BONE MARROW with aspiration and ancillary studies    Diagnosis: Multiple myeloma (H) [C90.00]  Diagnosis Additional Information: No value filed.    Anesthesia Type:   MAC     Note:    Oropharynx: oropharynx clear of all foreign objects  Level of Consciousness: drowsy  Oxygen Supplementation: blow-by O2 and face mask  Level of Supplemental Oxygen (L/min / FiO2): 5  Independent Airway: airway patency satisfactory and stable  Dentition: dentition unchanged  Vital Signs Stable: post-procedure vital signs reviewed and stable  Report to RN Given: handoff report given  Patient transferred to: Phase II    Handoff Report: Identifed the Patient, Identified the Reponsible Provider, Reviewed the pertinent medical history, Discussed the surgical course, Reviewed Intra-OP anesthesia mangement and issues during anesthesia, Set expectations for post-procedure period and Allowed opportunity for questions and acknowledgement of understanding      Vitals: (Last set prior to Anesthesia Care Transfer)  CRNA VITALS  5/17/2021 1011 - 5/17/2021 1045      5/17/2021             SpO2:  100 %        Electronically Signed By: PATTY Mantilla CRNA  May 17, 2021  10:45 AM

## 2021-05-17 NOTE — TELEPHONE ENCOUNTER
Oral Chemotherapy Monitoring Program   Medication: Revlimid  Rx: 25mg PO daily on days 1 through 14 of 21 day cycle   Auth #: [id number]   Risk Category: Adult Male  Routine survey questions reviewed.   [facility]  Oncology Pharmacy Liaison   [email or web address]  [phone] (phone  [phone] (fax

## 2021-05-17 NOTE — DISCHARGE INSTRUCTIONS
GENERAL ANESTHESIA OR SEDATION ADULT DISCHARGE INSTRUCTIONS   SPECIAL PRECAUTIONS FOR 24 HOURS AFTER SURGERY    IT IS NOT UNUSUAL TO FEEL LIGHT-HEADED OR FAINT, UP TO 24 HOURS AFTER SURGERY OR WHILE TAKING PAIN MEDICATION.  IF YOU HAVE THESE SYMPTOMS; SIT FOR A FEW MINUTES BEFORE STANDING AND HAVE SOMEONE ASSIST YOU WHEN YOU GET UP TO WALK OR USE THE BATHROOM.    YOU SHOULD REST AND RELAX FOR THE NEXT 24 HOURS AND YOU MUST MAKE ARRANGEMENTS TO HAVE SOMEONE STAY WITH YOU FOR AT LEAST 24 HOURS AFTER YOUR DISCHARGE.  AVOID HAZARDOUS AND STRENUOUS ACTIVITIES.  DO NOT MAKE IMPORTANT DECISIONS FOR 24 HOURS.    DO NOT DRIVE ANY VEHICLE OR OPERATE MECHANICAL EQUIPMENT FOR 24 HOURS FOLLOWING THE END OF YOUR SURGERY.  EVEN THOUGH YOU MAY FEEL NORMAL, YOUR REACTIONS MAY BE AFFECTED BY THE MEDICATION YOU HAVE RECEIVED.    DO NOT DRINK ALCOHOLIC BEVERAGES FOR 24 HOURS FOLLOWING YOUR SURGERY.    DRINK CLEAR LIQUIDS (APPLE JUICE, GINGER ALE, 7-UP, BROTH, ETC.).  PROGRESS TO YOUR REGULAR DIET AS YOU FEEL ABLE.    YOU MAY HAVE A DRY MOUTH, A SORE THROAT, MUSCLES ACHES OR TROUBLE SLEEPING.  THESE SHOULD GO AWAY AFTER 24 HOURS.    CALL YOUR DOCTOR FOR ANY OF THE FOLLOWING:  SIGNS OF INFECTION (FEVER, GROWING TENDERNESS AT THE SURGERY SITE, A LARGE AMOUNT OF DRAINAGE OR BLEEDING, SEVERE PAIN, FOUL-SMELLING DRAINAGE, REDNESS OR SWELLING.    IT HAS BEEN OVER 8 TO 10 HOURS SINCE SURGERY AND YOU ARE STILL NOT ABLE TO URINATE (PASS WATER).     BONE MARROW BIOPSY DISCHARGE INSTRUCTIONS  COLIN MALDONADO, CYRUS MARTÍNEZ & CLARY MANCIA    HOW SHOULD I CARE FOR MYSELF AFTERWARD?    1.  Wear the bandage for 24 hours.  Keep it dry.  After you remove the bandage, you may take a shower.  2.  You may see some bruising at the needle site.  3.  Bleeding is rare.  If the amount of blood is larger than the size of a quarter, lie on the floor for 30 minutes to put pressure on the needle site.  If you are still bleeding after that, call your doctor.  4.  Return  to your normal diet, routine and medicines as you feel able.  5.  Infection is rare.  Call your doctor or clinic at once if you have signs such as:  - redness, pain, warmth or swelling at the site  - a fever of 100.5 degrees F or higher, taken under the tongue.    If you have had medicine to help you relax:    1.  Someone will need to drive you home.  2.  You may not remember the biopsy.  3.  You may have a headache, nausea (feeling sick to your stomach) and vomiting (throwing up).  4.  Do not drive or use heavy machinery for 24 hours.  5.  Do not drink alcohol for 24 hours.      WHEN WILL I GET MY RESULTS?    In most cases, your doctor will have your results in 5 to 7 days.

## 2021-05-17 NOTE — ORAL ONC MGMT
"Oral Chemotherapy Monitoring Program    Lab Monitoring Plan  CMP monthly, CBC weekly x 8 then every two weeks x 2 then monthly and as needed.  Subjective/Objective:  June Ronquillo is a 50 year old male contacted by phone for an initial visit for oral chemotherapy education.    ORAL CHEMOTHERAPY 5/14/2021 5/17/2021   Assessment Type Other New Teach   Diagnosis Code Multiple Myeloma Multiple Myeloma   Providers Dr. George Vazquez   [facility]   Drug Name Revlimid (lenalidomide) Revlimid (lenalidomide)   Dose 25 mg 25 mg   Current Schedule Daily Daily   Cycle Details 2 weeks on, 1 week off 2 weeks on, 1 week off   Any new drug interactions? No No   Is the dose as ordered appropriate for the patient? Yes Yes       Last PHQ-2 Score on record:   PHQ-2 ( 1999 Pfizer) 4/26/2019 1/22/2019   Q1: Little interest or pleasure in doing things 0 0   Q2: Feeling down, depressed or hopeless 0 0   PHQ-2 Score 0 0       Vitals:  BP:   BP Readings from Last 1 Encounters:   05/17/21 (!) 150/94     Wt Readings from Last 1 Encounters:   05/17/21 79.4 kg (175 lb)     Estimated body surface area is 1.91 meters squared as calculated from the following:    Height as of an earlier encounter on 5/17/21: 1.651 m (5' 5\").    Weight as of an earlier encounter on 5/17/21: 79.4 kg (175 lb).    Labs:  _  Result Component Current Result Ref Range   Sodium 139 (5/3/2021) 133 - 144 mmol/L     _  Result Component Current Result Ref Range   Potassium 4.0 (5/3/2021) 3.4 - 5.3 mmol/L     _  Result Component Current Result Ref Range   Calcium 9.6 (5/3/2021) 8.5 - 10.1 mg/dL     No results found for Mag within last 30 days.     No results found for Phos within last 30 days.     _  Result Component Current Result Ref Range   Albumin 4.0 (4/30/2021) 3.4 - 5.0 g/dL     _  Result Component Current Result Ref Range   Urea Nitrogen 15 (5/3/2021) 7 - 30 mg/dL     _  Result Component Current Result Ref Range   Creatinine 1.01 " (5/3/2021) 0.66 - 1.25 mg/dL     _  Result Component Current Result Ref Range   AST 44 (4/30/2021) 0 - 45 U/L     _  Result Component Current Result Ref Range   ALT 26 (4/30/2021) 0 - 70 U/L     _  Result Component Current Result Ref Range   Bilirubin Total 0.4 (4/30/2021) 0.2 - 1.3 mg/dL     _  Result Component Current Result Ref Range   WBC 18.8 (H) (5/17/2021) 4.0 - 11.0 10e9/L     _  Result Component Current Result Ref Range   Hemoglobin 13.0 (L) (5/17/2021) 13.3 - 17.7 g/dL     _  Result Component Current Result Ref Range   Platelet Count 462 (H) (5/17/2021) 150 - 450 10e9/L     _  Result Component Current Result Ref Range   Absolute Neutrophil 15.2 (H) (5/17/2021) 1.6 - 8.3 10e9/L       Assessment:  Patient is appropriate to start therapy.    Plan:  Basic chemotherapy teaching was reviewed with the patient including indication, start date of therapy, dose, administration, adverse effects, missed doses, food and drug interactions, monitoring, side effect management, office contact information, and safe handling. Written materials were provided and all questions answered.    Follow-Up:  About one week after start of Revlimid.     Lazaro Moss PharmD  [facility]  [phone]  May 17, 2021

## 2021-05-17 NOTE — ANESTHESIA POSTPROCEDURE EVALUATION
Patient: June Ronquillo    Procedure(s):  BIOPSY, BONE MARROW with aspiration and ancillary studies    Diagnosis:Multiple myeloma (H) [C90.00]  Diagnosis Additional Information: No value filed.    Anesthesia Type:  MAC    Note:     Postop Pain Control: Uneventful            Sign Out: Well controlled pain   PONV: No   Neuro/Psych: Uneventful            Sign Out: Acceptable/Baseline neuro status   Airway/Respiratory: Uneventful            Sign Out: Acceptable/Baseline resp. status   CV/Hemodynamics: Uneventful            Sign Out: Acceptable CV status; No obvious hypovolemia; No obvious fluid overload   Other NRE: NONE   DID A NON-ROUTINE EVENT OCCUR? No           Last vitals:  Vitals:    05/17/21 1055 05/17/21 1110 05/17/21 1120   BP: (!) 140/93 (!) 150/92 132/88   Pulse: 77 77 79   Resp: 18 18 16   Temp:      SpO2: 98% 98% 98%       Last vitals prior to Anesthesia Care Transfer:  CRNA VITALS  5/17/2021 1011 - 5/17/2021 1111      5/17/2021             SpO2:  100 %          Electronically Signed By: Dominguez Abrams MD  May 17, 2021  11:17 AM

## 2021-05-18 LAB
COPATH REPORT: NORMAL
INTERPRETATION ECG - MUSE: NORMAL

## 2021-05-19 ENCOUNTER — VIRTUAL VISIT (OUTPATIENT)
Dept: ONCOLOGY | Facility: CLINIC | Age: 51
End: 2021-05-19
Attending: STUDENT IN AN ORGANIZED HEALTH CARE EDUCATION/TRAINING PROGRAM
Payer: COMMERCIAL

## 2021-05-19 DIAGNOSIS — C90.00 MULTIPLE MYELOMA NOT HAVING ACHIEVED REMISSION (H): Primary | ICD-10-CM

## 2021-05-19 DIAGNOSIS — S22.39XD CLOSED FRACTURE OF ONE RIB WITH ROUTINE HEALING, UNSPECIFIED LATERALITY, SUBSEQUENT ENCOUNTER: ICD-10-CM

## 2021-05-19 PROCEDURE — 999N001193 HC VIDEO/TELEPHONE VISIT; NO CHARGE

## 2021-05-19 PROCEDURE — 99214 OFFICE O/P EST MOD 30 MIN: CPT | Mod: 95 | Performed by: STUDENT IN AN ORGANIZED HEALTH CARE EDUCATION/TRAINING PROGRAM

## 2021-05-19 RX ORDER — ASPIRIN 325 MG
325 TABLET ORAL DAILY
Qty: 30 TABLET | Refills: 4 | Status: SHIPPED | OUTPATIENT
Start: 2021-05-19 | End: 2021-10-25

## 2021-05-19 RX ORDER — ACYCLOVIR 400 MG/1
400 TABLET ORAL 2 TIMES DAILY
Qty: 60 TABLET | Refills: 6 | Status: ON HOLD | OUTPATIENT
Start: 2021-05-19 | End: 2021-11-11

## 2021-05-19 RX ORDER — LEVOFLOXACIN 250 MG/1
250 TABLET, FILM COATED ORAL DAILY
Qty: 30 TABLET | Refills: 4 | Status: SHIPPED | OUTPATIENT
Start: 2021-05-19 | End: 2021-10-18

## 2021-05-19 NOTE — LETTER
"    5/19/2021         RE: June Ronquillo  [address] [address]        Dear Colleague,    Thank you for referring your patient, June Ronquillo, to the [facility]. Please see a copy of my visit note below.    June is a 50 year old who is being evaluated via a billable video visit.      How would you like to obtain your AVS? MyChart  If the video visit is dropped, the invitation should be resent by: Send to e-mail at: [email or web address]  Will anyone else be joining your video visit? No       I have reviewed and updated the patient's allergies and medication list.    Concerns: none  Refills: none     Vitals - Patient Reported  Weight (Patient Reported): 79.4 kg (175 lb)  Height (Patient Reported): 165.1 cm (5' 5\")  BMI (Based on Pt Reported Ht/Wt): 29.12  Pain Score: Moderate Pain (4)  Pain Loc: Mid Back    Kathy Xavier CMA        Video Start Time: 3:58 PM  Video-Visit Details    Type of service:  Video Visit    Video End Time:4:27 PM    Originating Location (pt. Location): Home    Distant Location (provider location):  [facility]     Platform used for Video Visit: [facility]      Oncologic Hx:   - 4/30/2021 M spike of 34.8 in urine.M spike in blood 0.2; IgG 702 with depressed IgA and IgM. Beta 2 microglobulin 2.7. Lambda  with K/L ratio of 0.01. WBC 11.1 with absolute eos of 1.0. hgb, plt, Cr (1.1), and albumin WNL.    -4/30/2021 CT abd/pelvis showed sclerotic marrow changes with numerous lytic appearing lesions throughout the imaged portions of the spine, pelvis and ribs.      -PET scan 5/11/2021 Numerous osteolytic lesions which predominantly demonstrate uptake below liver background levels. There is one intraosseous lesion in the left lateral 9th rib that demonstrates uptake above background, possibly due to nondisplaced fracture. Adjacent to this lesion is mild  hypermetabolism to the left pleura, with new small left " pleural effusion, suspicious for malignant effusion versus posttraumatic effusion. Pleural uptake may represent extramedullary myeloma extending along the intercostal space versus inflammation.    - BM bx 5/17/2021 with flow showing 4.0% plasma cells which express CD19 (dim), CD38 (bright), CD45 (dim), , and monotypic cytoplasmic lambda immunoglobulin light chains but lack CD20 and CD56.  Hypercellular bone marrow for age (approximately 75% cellularity) with   adequate trilineage hematopoiesis. Plasma cell infiltrate: Interstitial, lambda monotypic and representing approximately 60% the bone marrow cellularity, by  immunohistochemical stain.       Interval Hx: Logan's ribs are feeling better after a four day pulse of dexamethasone. He is still needing tylenol though. He continues to work. No new bone pains or other symptoms.    A comprehensive review of systems was completed and negative except as described above.       Physical Exam:   General: NAD  HEENT: no scleral icterus  Resp: nonlabored breathing, no cough  Skin: no rashes observed  Neuro: alert, conversant, no facial droop  Psych: appropriate mood and affect      The rest of a comprehensive physical examination is deferred due to Public Health Emergency telephone visit restrictions      Assessment and Plan  Logan has multiple myeloma of unknown risk at this time as his cytogenetics and FISH are pending. He does have significant bone marrow and cortical bone involvement so I will treat him with Miracle-RVd (21 day cycle). We discussed that possible side effects include infections, low blood counts, blood clots, shingles reactivation, secondary cancers, mood disturbances, hyperglycemia, neuropathy, and insomnia. We discussed the regimen at length and will plan to start without including day 4 and day 11 velcade to minimize infusion appointments as this are difficult for him with work. I told him that if his cytogenetics come back high risk, than we should  incorporate the day 4 and day 11 velcade to get improved disease control. We will also do this if we don't see rapid improvement in his myeloma labs. We did not discuss this at today's visit but he would be a good transplant candidate so will plan for induction followed by BMT consult.     Levofloxacin 250 mg every day,  mg every day, and acyclovir 400 mg BID for prophylaxis      Left nondisplaced rib fracture:  His Vit D was checked in January and was WNL but given his bone lesions, he will increase his supplement to 10,000 international unit(s). Also we discussed zometa and that the goal is to do 2 years of monthly infusions. He will work on getting his dental work completed so we can start this. He will continue tylenol for pain right now but will let me know if he feels he needs something stronger for pain control.     Barbi Vazquez MD PhD          Again, thank you for allowing me to participate in the care of your patient.        Sincerely,        Barbi Vazquez MD

## 2021-05-20 LAB — COPATH REPORT: NORMAL

## 2021-05-21 ENCOUNTER — TELEPHONE (OUTPATIENT)
Dept: ONCOLOGY | Facility: CLINIC | Age: 51
End: 2021-05-21

## 2021-05-21 NOTE — TELEPHONE ENCOUNTER
I talked to Logan today and he understands that he is restricted to fill with [facility].  He has my number if he has any questions or if he has any difficculty setting up his order.      Brice Persaud CPhT  [facility]  Oncology Pharmacy Liaison   [email or web address]  [phone] (phone  [phone] (fax

## 2021-05-27 ENCOUNTER — INFUSION THERAPY VISIT (OUTPATIENT)
Dept: ONCOLOGY | Facility: CLINIC | Age: 51
End: 2021-05-27
Attending: STUDENT IN AN ORGANIZED HEALTH CARE EDUCATION/TRAINING PROGRAM
Payer: COMMERCIAL

## 2021-05-27 VITALS
TEMPERATURE: 97.7 F | SYSTOLIC BLOOD PRESSURE: 136 MMHG | HEIGHT: 65 IN | HEART RATE: 92 BPM | WEIGHT: 176.4 LBS | OXYGEN SATURATION: 98 % | BODY MASS INDEX: 29.39 KG/M2 | DIASTOLIC BLOOD PRESSURE: 90 MMHG | RESPIRATION RATE: 18 BRPM

## 2021-05-27 DIAGNOSIS — C90.00 MULTIPLE MYELOMA NOT HAVING ACHIEVED REMISSION (H): Primary | ICD-10-CM

## 2021-05-27 LAB
ABO + RH BLD: NORMAL
ABO + RH BLD: NORMAL
ALBUMIN SERPL-MCNC: 3.7 G/DL (ref 3.4–5)
ALP SERPL-CCNC: 60 U/L (ref 40–150)
ALT SERPL W P-5'-P-CCNC: 26 U/L (ref 0–70)
ANION GAP SERPL CALCULATED.3IONS-SCNC: 8 MMOL/L (ref 3–14)
AST SERPL W P-5'-P-CCNC: 34 U/L (ref 0–45)
BASOPHILS # BLD AUTO: 0 10E9/L (ref 0–0.2)
BASOPHILS NFR BLD AUTO: 0.2 %
BILIRUB SERPL-MCNC: 0.5 MG/DL (ref 0.2–1.3)
BLD GP AB SCN SERPL QL: NORMAL
BLOOD BANK CMNT PATIENT-IMP: NORMAL
BUN SERPL-MCNC: 16 MG/DL (ref 7–30)
CALCIUM SERPL-MCNC: 8.9 MG/DL (ref 8.5–10.1)
CHLORIDE SERPL-SCNC: 106 MMOL/L (ref 94–109)
CO2 SERPL-SCNC: 25 MMOL/L (ref 20–32)
COPATH REPORT: NORMAL
COPATH REPORT: NORMAL
CREAT SERPL-MCNC: 0.95 MG/DL (ref 0.66–1.25)
DIFFERENTIAL METHOD BLD: ABNORMAL
EOSINOPHIL # BLD AUTO: 0.4 10E9/L (ref 0–0.7)
EOSINOPHIL NFR BLD AUTO: 2.9 %
ERYTHROCYTE [DISTWIDTH] IN BLOOD BY AUTOMATED COUNT: 15.4 % (ref 10–15)
GFR SERPL CREATININE-BSD FRML MDRD: >90 ML/MIN/{1.73_M2}
GLUCOSE SERPL-MCNC: 124 MG/DL (ref 70–99)
HCT VFR BLD AUTO: 37.9 % (ref 40–53)
HGB BLD-MCNC: 12.9 G/DL (ref 13.3–17.7)
IMM GRANULOCYTES # BLD: 0.2 10E9/L (ref 0–0.4)
IMM GRANULOCYTES NFR BLD: 1.5 %
KAPPA LC UR-MCNC: 0.89 MG/DL (ref 0.33–1.94)
KAPPA LC/LAMBDA SER: 0.01 {RATIO} (ref 0.26–1.65)
LAMBDA LC SERPL-MCNC: 132.88 MG/DL (ref 0.57–2.63)
LYMPHOCYTES # BLD AUTO: 2.3 10E9/L (ref 0.8–5.3)
LYMPHOCYTES NFR BLD AUTO: 16.2 %
MCH RBC QN AUTO: 30.9 PG (ref 26.5–33)
MCHC RBC AUTO-ENTMCNC: 34 G/DL (ref 31.5–36.5)
MCV RBC AUTO: 91 FL (ref 78–100)
MONOCYTES # BLD AUTO: 1.3 10E9/L (ref 0–1.3)
MONOCYTES NFR BLD AUTO: 8.8 %
NEUTROPHILS # BLD AUTO: 10.1 10E9/L (ref 1.6–8.3)
NEUTROPHILS NFR BLD AUTO: 70.4 %
NRBC # BLD AUTO: 0 10*3/UL
NRBC BLD AUTO-RTO: 0 /100
PLATELET # BLD AUTO: 348 10E9/L (ref 150–450)
POTASSIUM SERPL-SCNC: 3.4 MMOL/L (ref 3.4–5.3)
PROT SERPL-MCNC: 6.8 G/DL (ref 6.8–8.8)
RBC # BLD AUTO: 4.18 10E12/L (ref 4.4–5.9)
SODIUM SERPL-SCNC: 138 MMOL/L (ref 133–144)
SPECIMEN EXP DATE BLD: NORMAL
WBC # BLD AUTO: 14.4 10E9/L (ref 4–11)

## 2021-05-27 PROCEDURE — 84165 PROTEIN E-PHORESIS SERUM: CPT | Mod: 26 | Performed by: PATHOLOGY

## 2021-05-27 PROCEDURE — 250N000011 HC RX IP 250 OP 636: Performed by: STUDENT IN AN ORGANIZED HEALTH CARE EDUCATION/TRAINING PROGRAM

## 2021-05-27 PROCEDURE — 84165 PROTEIN E-PHORESIS SERUM: CPT | Mod: TC | Performed by: STUDENT IN AN ORGANIZED HEALTH CARE EDUCATION/TRAINING PROGRAM

## 2021-05-27 PROCEDURE — 86901 BLOOD TYPING SEROLOGIC RH(D): CPT | Performed by: STUDENT IN AN ORGANIZED HEALTH CARE EDUCATION/TRAINING PROGRAM

## 2021-05-27 PROCEDURE — 86334 IMMUNOFIX E-PHORESIS SERUM: CPT | Mod: 26 | Performed by: PATHOLOGY

## 2021-05-27 PROCEDURE — 999N001036 HC STATISTIC TOTAL PROTEIN: Performed by: STUDENT IN AN ORGANIZED HEALTH CARE EDUCATION/TRAINING PROGRAM

## 2021-05-27 PROCEDURE — 36415 COLL VENOUS BLD VENIPUNCTURE: CPT

## 2021-05-27 PROCEDURE — 82784 ASSAY IGA/IGD/IGG/IGM EACH: CPT | Performed by: STUDENT IN AN ORGANIZED HEALTH CARE EDUCATION/TRAINING PROGRAM

## 2021-05-27 PROCEDURE — 83883 ASSAY NEPHELOMETRY NOT SPEC: CPT | Performed by: STUDENT IN AN ORGANIZED HEALTH CARE EDUCATION/TRAINING PROGRAM

## 2021-05-27 PROCEDURE — 96401 CHEMO ANTI-NEOPL SQ/IM: CPT

## 2021-05-27 PROCEDURE — 258N000003 HC RX IP 258 OP 636: Performed by: STUDENT IN AN ORGANIZED HEALTH CARE EDUCATION/TRAINING PROGRAM

## 2021-05-27 PROCEDURE — 86850 RBC ANTIBODY SCREEN: CPT | Performed by: STUDENT IN AN ORGANIZED HEALTH CARE EDUCATION/TRAINING PROGRAM

## 2021-05-27 PROCEDURE — 96374 THER/PROPH/DIAG INJ IV PUSH: CPT

## 2021-05-27 PROCEDURE — 250N000013 HC RX MED GY IP 250 OP 250 PS 637: Performed by: STUDENT IN AN ORGANIZED HEALTH CARE EDUCATION/TRAINING PROGRAM

## 2021-05-27 PROCEDURE — 86900 BLOOD TYPING SEROLOGIC ABO: CPT | Performed by: STUDENT IN AN ORGANIZED HEALTH CARE EDUCATION/TRAINING PROGRAM

## 2021-05-27 PROCEDURE — 80053 COMPREHEN METABOLIC PANEL: CPT | Performed by: STUDENT IN AN ORGANIZED HEALTH CARE EDUCATION/TRAINING PROGRAM

## 2021-05-27 PROCEDURE — 86334 IMMUNOFIX E-PHORESIS SERUM: CPT | Mod: TC | Performed by: STUDENT IN AN ORGANIZED HEALTH CARE EDUCATION/TRAINING PROGRAM

## 2021-05-27 PROCEDURE — 85025 COMPLETE CBC W/AUTO DIFF WBC: CPT | Performed by: STUDENT IN AN ORGANIZED HEALTH CARE EDUCATION/TRAINING PROGRAM

## 2021-05-27 RX ORDER — DIPHENHYDRAMINE HCL 25 MG
50 CAPSULE ORAL ONCE
Status: COMPLETED | OUTPATIENT
Start: 2021-05-27 | End: 2021-05-27

## 2021-05-27 RX ORDER — DEXAMETHASONE 4 MG/1
TABLET ORAL
Qty: 25 TABLET | Refills: 0 | Status: SHIPPED | OUTPATIENT
Start: 2021-05-27 | End: 2021-05-27

## 2021-05-27 RX ORDER — ACETAMINOPHEN 325 MG/1
650 TABLET ORAL ONCE
Status: COMPLETED | OUTPATIENT
Start: 2021-05-27 | End: 2021-05-27

## 2021-05-27 RX ORDER — DEXAMETHASONE 4 MG/1
TABLET ORAL
Qty: 15 TABLET | Refills: 0 | Status: SHIPPED | OUTPATIENT
Start: 2021-05-27 | End: 2021-08-06

## 2021-05-27 RX ORDER — MONTELUKAST SODIUM 10 MG/1
10 TABLET ORAL ONCE
Status: COMPLETED | OUTPATIENT
Start: 2021-05-27 | End: 2021-05-27

## 2021-05-27 RX ADMIN — SODIUM CHLORIDE 250 ML: 9 INJECTION, SOLUTION INTRAVENOUS at 09:40

## 2021-05-27 RX ADMIN — DIPHENHYDRAMINE HYDROCHLORIDE 50 MG: 25 CAPSULE ORAL at 09:39

## 2021-05-27 RX ADMIN — ACETAMINOPHEN 650 MG: 325 TABLET ORAL at 09:39

## 2021-05-27 RX ADMIN — BORTEZOMIB 2.5 MG: 3.5 INJECTION, POWDER, LYOPHILIZED, FOR SOLUTION INTRAVENOUS; SUBCUTANEOUS at 09:48

## 2021-05-27 RX ADMIN — DEXAMETHASONE SODIUM PHOSPHATE 20 MG: 10 INJECTION, SOLUTION INTRAMUSCULAR; INTRAVENOUS at 09:42

## 2021-05-27 RX ADMIN — DARATUMUMAB AND HYALURONIDASE-FIHJ (HUMAN RECOMBINANT) 1800 MG: 1800; 30000 INJECTION SUBCUTANEOUS at 10:39

## 2021-05-27 RX ADMIN — MONTELUKAST SODIUM 10 MG: 10 TABLET, COATED ORAL at 09:39

## 2021-05-27 ASSESSMENT — PAIN SCALES - GENERAL: PAINLEVEL: MILD PAIN (3)

## 2021-05-27 NOTE — PROGRESS NOTES
Infusion Nursing Note:  June Ronquillo presents today for Cycle 1 Day 1 Velcade and Darzalex Faspro.    Patient seen by provider today: No   present during visit today: Not Applicable.    Note: Patient is new to the infusion room today and is receiving Velcade and Darzalex Faspro for the first time.  Patient oriented to infusion room, location of bathrooms and nutrition stations, and call light.  Verbally reviewed and written information on chemotherapy teaching, side effects, take-home medications, and follow-up schedule given and discussed with patient. Patient instructed to call triage with any questions or if he experiences a temperature >100.5, shaking chills, uncontrolled nausea/vomiting/diarrhea, dizziness, shortness of breath, bleeding not relieved with pressure, or with any other concerns.     Pt arrives to infusion today feeling well. Denies any s/s of infection such as fever, chills, cough or sob. No new changes or new concerns since visit with Dr. Vazquez.     Pt states his right shoulder pain to be a 3/10. Denies the need for intervention during infusion.     Intravenous Access:  Peripheral IV placed.    Treatment Conditions:  Lab Results   Component Value Date    HGB 12.9 05/27/2021     Lab Results   Component Value Date    WBC 14.4 05/27/2021      Lab Results   Component Value Date    ANEU 10.1 05/27/2021     Lab Results   Component Value Date     05/27/2021      Lab Results   Component Value Date     05/27/2021                   Lab Results   Component Value Date    POTASSIUM 3.4 05/27/2021           No results found for: MAG         Lab Results   Component Value Date    CR 0.95 05/27/2021                   Lab Results   Component Value Date    HARDEEP 8.9 05/27/2021                Lab Results   Component Value Date    BILITOTAL 0.5 05/27/2021           Lab Results   Component Value Date    ALBUMIN 3.7 05/27/2021                    Lab Results   Component Value Date    ALT 26  05/27/2021           Lab Results   Component Value Date    AST 34 05/27/2021     Results reviewed, labs MET treatment parameters, ok to proceed with treatment.    Post Infusion Assessment:  Patient tolerated infusion without incident.  Patient tolerated Velcade injection without incident to the left arm.  Patient tolerated Darzalex injection without incident to the left abdomen.  Patient observed for 3.5 hours post Darzalex Faspro per protocol.  Blood return noted pre and post infusion.  Site patent and intact, free from redness, edema or discomfort.  No evidence of extravasations.  Access discontinued per protocol.     Discharge Plan:   Prescription refills given for Compazine and Decadron.  AVS to patient via Periscope.  Patient will return 6/4 for next appointment.   Patient discharged in stable condition accompanied by: self.  Departure Mode: Ambulatory.  Face to Face time: 10.      Kerry Jeff RN

## 2021-05-27 NOTE — NURSING NOTE
Chief Complaint   Patient presents with     Blood Draw     Labs drawn from  by RN in lab. Vitals taken. Checked into next appointment.      Labs drawn by venipuncture by RN in lab.  Vital signs taken.  Pt checked in to next appointment.    Ana Cristina Sousa RN

## 2021-05-27 NOTE — PATIENT INSTRUCTIONS
[facility] Triage and after hours / weekends / holidays:  [phone]    Please call the triage or after hours line if you experience a temperature greater than or equal to 100.5, shaking chills, have uncontrolled nausea, vomiting and/or diarrhea, dizziness, shortness of breath, chest pain, bleeding, unexplained bruising, or if you have any other new/concerning symptoms, questions or concerns.      If you are having any concerning symptoms or wish to speak to a provider before your next infusion visit, please call your care coordinator or triage to notify them so we can adequately serve you.     If you need a refill on a narcotic prescription or other medication, please call before your infusion appointment.                 May 2021      Justin Monday Tuesday Wednesday Thursday Friday Saturday                                 1       2     3    LAB  12:00 PM   (15 min.)   OP LAB UR   [facility] 4     5     6     7     8       9     10     11    PET ONCOLOGY WHOLE BODY   1:15 PM   (45 min.)   [id number]   [facility]    CT SOFT TISSUE NECK W   1:30 PM   (30 min.)   RHPET1   [facility] 12    NEW   3:45 PM   (60 min.)   Barbi Vazquez MD   [facility] 13    PRE-PROCEDURE COVID PCR   3:00 PM   (15 min.)   UP COVID LAB   [facility] 14     15       16     17    Admission   8:37 AM   Niharika Regalado MD   [facility] PreOP/PostOP   (Discharge: 5/17/2021)    BIOPSY, BONE MARROW   9:50 AM   Niharika Regalado MD    OR 18     19    VIDEO VISIT RETURN   3:45 PM   (30 min.)   Barbi Vazquez MD   [facility] 20     21     22       23     24     25     26     27    LAB PERIPHERAL   8:30 AM   (15 min.)    [facility] DRAW   [facility]    ONC INFUSION 6 HR (360 MIN)   9:00 AM   (360 min.)    ONC INFUSION NURSE     [facility] 28 29 30 31 June 2021 Sunday Monday Tuesday Wednesday Thursday Friday Saturday             1     2     3     4     5       6     7     8     9     10     11     12       13     14     15     16     17     18     19       20     21     22     23     24     25  Happy Birthday!     26       27     28     29     30                                   Recent Results (from the past 24 hour(s))   [place] and lambda light chain    Collection Time: 05/27/21  8:55 AM   Result Value Ref Range    Kappa Free Lt Chain 0.89 0.33 - 1.94 mg/dL    Lambda Free Lt Chain 132.88 (H) 0.57 - 2.63 mg/dL    Kappa Lambda Ratio 0.01 (L) 0.26 - 1.65   Protein Immunofixation Serum    Collection Time: 05/27/21  8:55 AM   Result Value Ref Range    Immunofixation ELP PENDING      (L) 610 - 1,616 mg/dL    IGA 37 (L) 84 - 499 mg/dL    IGM 14 (L) 35 - 242 mg/dL   CBC with platelets differential    Collection Time: 05/27/21  8:55 AM   Result Value Ref Range    WBC 14.4 (H) 4.0 - 11.0 10e9/L    RBC Count 4.18 (L) 4.4 - 5.9 10e12/L    Hemoglobin 12.9 (L) 13.3 - 17.7 g/dL    Hematocrit 37.9 (L) 40.0 - 53.0 %    MCV 91 78 - 100 fl    MCH 30.9 26.5 - 33.0 pg    MCHC 34.0 31.5 - 36.5 g/dL    RDW 15.4 (H) 10.0 - 15.0 %    Platelet Count 348 150 - 450 10e9/L    Diff Method Automated Method     % Neutrophils 70.4 %    % Lymphocytes 16.2 %    % Monocytes 8.8 %    % Eosinophils 2.9 %    % Basophils 0.2 %    % Immature Granulocytes 1.5 %    Nucleated RBCs 0 0 /100    Absolute Neutrophil 10.1 (H) 1.6 - 8.3 10e9/L    Absolute Lymphocytes 2.3 0.8 - 5.3 10e9/L    Absolute Monocytes 1.3 0.0 - 1.3 10e9/L    Absolute Eosinophils 0.4 0.0 - 0.7 10e9/L    Absolute Basophils 0.0 0.0 - 0.2 10e9/L    Abs Immature Granulocytes 0.2 0 - 0.4 10e9/L    Absolute Nucleated RBC 0.0    Comprehensive metabolic panel    Collection Time: 05/27/21  8:55 AM   Result Value Ref Range    Sodium  138 133 - 144 mmol/L    Potassium 3.4 3.4 - 5.3 mmol/L    Chloride 106 94 - 109 mmol/L    Carbon Dioxide 25 20 - 32 mmol/L    Anion Gap 8 3 - 14 mmol/L    Glucose 124 (H) 70 - 99 mg/dL    Urea Nitrogen 16 7 - 30 mg/dL    Creatinine 0.95 0.66 - 1.25 mg/dL    GFR Estimate >90 >60 mL/min/[1.73_m2]    GFR Estimate If Black >90 >60 mL/min/[1.73_m2]    Calcium 8.9 8.5 - 10.1 mg/dL    Bilirubin Total 0.5 0.2 - 1.3 mg/dL    Albumin 3.7 3.4 - 5.0 g/dL    Protein Total 6.8 6.8 - 8.8 g/dL    Alkaline Phosphatase 60 40 - 150 U/L    ALT 26 0 - 70 U/L    AST 34 0 - 45 U/L   Protein electrophoresis    Collection Time: 05/27/21  8:55 AM   Result Value Ref Range    Albumin Fraction PENDING 3.7 - 5.1 g/dL    Alpha 1 Fraction PENDING 0.2 - 0.4 g/dL    Alpha 2 Fraction PENDING 0.5 - 0.9 g/dL    Beta Fraction PENDING 0.6 - 1.0 g/dL    Gamma Fraction PENDING 0.7 - 1.6 g/dL    Monoclonal Peak PENDING 0.0 g/dL    ELP Interpretation: PENDING    ABO/Rh type and screen    Collection Time: 05/27/21  8:55 AM   Result Value Ref Range    ABO PENDING     Antibody Screen PENDING     Test Valid Only At          [facility]    Specimen Expires 05/30/2021

## 2021-05-28 LAB
ALBUMIN SERPL ELPH-MCNC: 4.2 G/DL (ref 3.7–5.1)
ALPHA1 GLOB SERPL ELPH-MCNC: 0.3 G/DL (ref 0.2–0.4)
ALPHA2 GLOB SERPL ELPH-MCNC: 0.7 G/DL (ref 0.5–0.9)
B-GLOBULIN SERPL ELPH-MCNC: 0.8 G/DL (ref 0.6–1)
GAMMA GLOB SERPL ELPH-MCNC: 0.5 G/DL (ref 0.7–1.6)
IGA SERPL-MCNC: 37 MG/DL (ref 84–499)
IGG SERPL-MCNC: 581 MG/DL (ref 610–1616)
IGM SERPL-MCNC: 14 MG/DL (ref 35–242)
M PROTEIN SERPL ELPH-MCNC: 0.1 G/DL
PROT PATTERN SERPL ELPH-IMP: ABNORMAL
PROT PATTERN SERPL IFE-IMP: ABNORMAL

## 2021-06-04 ENCOUNTER — INFUSION THERAPY VISIT (OUTPATIENT)
Dept: ONCOLOGY | Facility: CLINIC | Age: 51
End: 2021-06-04
Attending: STUDENT IN AN ORGANIZED HEALTH CARE EDUCATION/TRAINING PROGRAM
Payer: COMMERCIAL

## 2021-06-04 ENCOUNTER — APPOINTMENT (OUTPATIENT)
Dept: LAB | Facility: CLINIC | Age: 51
End: 2021-06-04
Attending: STUDENT IN AN ORGANIZED HEALTH CARE EDUCATION/TRAINING PROGRAM
Payer: COMMERCIAL

## 2021-06-04 VITALS
OXYGEN SATURATION: 97 % | HEART RATE: 100 BPM | TEMPERATURE: 98.3 F | DIASTOLIC BLOOD PRESSURE: 85 MMHG | SYSTOLIC BLOOD PRESSURE: 137 MMHG | WEIGHT: 171.2 LBS | RESPIRATION RATE: 18 BRPM | BODY MASS INDEX: 28.49 KG/M2

## 2021-06-04 DIAGNOSIS — C90.00 MULTIPLE MYELOMA NOT HAVING ACHIEVED REMISSION (H): Primary | ICD-10-CM

## 2021-06-04 LAB
ALBUMIN SERPL-MCNC: 3.6 G/DL (ref 3.4–5)
ALP SERPL-CCNC: 71 U/L (ref 40–150)
ALT SERPL W P-5'-P-CCNC: 23 U/L (ref 0–70)
ANION GAP SERPL CALCULATED.3IONS-SCNC: 9 MMOL/L (ref 3–14)
AST SERPL W P-5'-P-CCNC: 15 U/L (ref 0–45)
BASOPHILS # BLD AUTO: 0 10E9/L (ref 0–0.2)
BASOPHILS NFR BLD AUTO: 0.1 %
BILIRUB SERPL-MCNC: 0.5 MG/DL (ref 0.2–1.3)
BUN SERPL-MCNC: 14 MG/DL (ref 7–30)
CALCIUM SERPL-MCNC: 8.3 MG/DL (ref 8.5–10.1)
CHLORIDE SERPL-SCNC: 104 MMOL/L (ref 94–109)
CO2 SERPL-SCNC: 24 MMOL/L (ref 20–32)
CREAT SERPL-MCNC: 0.93 MG/DL (ref 0.66–1.25)
DIFFERENTIAL METHOD BLD: ABNORMAL
EOSINOPHIL # BLD AUTO: 0.6 10E9/L (ref 0–0.7)
EOSINOPHIL NFR BLD AUTO: 7.7 %
ERYTHROCYTE [DISTWIDTH] IN BLOOD BY AUTOMATED COUNT: 15.5 % (ref 10–15)
GFR SERPL CREATININE-BSD FRML MDRD: >90 ML/MIN/{1.73_M2}
GLUCOSE SERPL-MCNC: 152 MG/DL (ref 70–99)
HCT VFR BLD AUTO: 37.3 % (ref 40–53)
HGB BLD-MCNC: 12.9 G/DL (ref 13.3–17.7)
IMM GRANULOCYTES # BLD: 0 10E9/L (ref 0–0.4)
IMM GRANULOCYTES NFR BLD: 0.3 %
LDH SERPL L TO P-CCNC: 70 U/L (ref 85–227)
LYMPHOCYTES # BLD AUTO: 0.9 10E9/L (ref 0.8–5.3)
LYMPHOCYTES NFR BLD AUTO: 12.4 %
MCH RBC QN AUTO: 30.7 PG (ref 26.5–33)
MCHC RBC AUTO-ENTMCNC: 34.6 G/DL (ref 31.5–36.5)
MCV RBC AUTO: 89 FL (ref 78–100)
MONOCYTES # BLD AUTO: 0.9 10E9/L (ref 0–1.3)
MONOCYTES NFR BLD AUTO: 11.7 %
NEUTROPHILS # BLD AUTO: 5.1 10E9/L (ref 1.6–8.3)
NEUTROPHILS NFR BLD AUTO: 67.8 %
NRBC # BLD AUTO: 0 10*3/UL
NRBC BLD AUTO-RTO: 0 /100
PLATELET # BLD AUTO: 331 10E9/L (ref 150–450)
POTASSIUM SERPL-SCNC: 3.1 MMOL/L (ref 3.4–5.3)
PROT SERPL-MCNC: 6.7 G/DL (ref 6.8–8.8)
RBC # BLD AUTO: 4.2 10E12/L (ref 4.4–5.9)
SODIUM SERPL-SCNC: 136 MMOL/L (ref 133–144)
WBC # BLD AUTO: 7.5 10E9/L (ref 4–11)

## 2021-06-04 PROCEDURE — 250N000011 HC RX IP 250 OP 636: Performed by: STUDENT IN AN ORGANIZED HEALTH CARE EDUCATION/TRAINING PROGRAM

## 2021-06-04 PROCEDURE — 80053 COMPREHEN METABOLIC PANEL: CPT | Performed by: STUDENT IN AN ORGANIZED HEALTH CARE EDUCATION/TRAINING PROGRAM

## 2021-06-04 PROCEDURE — 99207 PR NO BILLABLE SERVICE THIS VISIT: CPT

## 2021-06-04 PROCEDURE — 250N000012 HC RX MED GY IP 250 OP 636 PS 637: Performed by: STUDENT IN AN ORGANIZED HEALTH CARE EDUCATION/TRAINING PROGRAM

## 2021-06-04 PROCEDURE — 96401 CHEMO ANTI-NEOPL SQ/IM: CPT

## 2021-06-04 PROCEDURE — 250N000013 HC RX MED GY IP 250 OP 250 PS 637: Performed by: STUDENT IN AN ORGANIZED HEALTH CARE EDUCATION/TRAINING PROGRAM

## 2021-06-04 PROCEDURE — 85025 COMPLETE CBC W/AUTO DIFF WBC: CPT | Performed by: STUDENT IN AN ORGANIZED HEALTH CARE EDUCATION/TRAINING PROGRAM

## 2021-06-04 PROCEDURE — 83615 LACTATE (LD) (LDH) ENZYME: CPT | Performed by: STUDENT IN AN ORGANIZED HEALTH CARE EDUCATION/TRAINING PROGRAM

## 2021-06-04 PROCEDURE — 36415 COLL VENOUS BLD VENIPUNCTURE: CPT

## 2021-06-04 RX ORDER — POTASSIUM CHLORIDE 1500 MG/1
40 TABLET, EXTENDED RELEASE ORAL ONCE
Status: COMPLETED | OUTPATIENT
Start: 2021-06-04 | End: 2021-06-04

## 2021-06-04 RX ORDER — ACETAMINOPHEN 325 MG/1
650 TABLET ORAL ONCE
Status: COMPLETED | OUTPATIENT
Start: 2021-06-04 | End: 2021-06-04

## 2021-06-04 RX ORDER — DEXAMETHASONE 4 MG/1
20 TABLET ORAL ONCE
Status: COMPLETED | OUTPATIENT
Start: 2021-06-04 | End: 2021-06-04

## 2021-06-04 RX ORDER — DIPHENHYDRAMINE HCL 25 MG
50 CAPSULE ORAL ONCE
Status: COMPLETED | OUTPATIENT
Start: 2021-06-04 | End: 2021-06-04

## 2021-06-04 RX ORDER — MONTELUKAST SODIUM 10 MG/1
10 TABLET ORAL ONCE
Status: COMPLETED | OUTPATIENT
Start: 2021-06-04 | End: 2021-06-04

## 2021-06-04 RX ADMIN — MONTELUKAST SODIUM 10 MG: 10 TABLET, COATED ORAL at 15:37

## 2021-06-04 RX ADMIN — POTASSIUM CHLORIDE 40 MEQ: 1500 TABLET, EXTENDED RELEASE ORAL at 16:08

## 2021-06-04 RX ADMIN — DIPHENHYDRAMINE HYDROCHLORIDE 50 MG: 25 CAPSULE ORAL at 15:37

## 2021-06-04 RX ADMIN — BORTEZOMIB 2.5 MG: 3.5 INJECTION, POWDER, LYOPHILIZED, FOR SOLUTION INTRAVENOUS; SUBCUTANEOUS at 16:09

## 2021-06-04 RX ADMIN — DARATUMUMAB AND HYALURONIDASE-FIHJ (HUMAN RECOMBINANT) 1800 MG: 1800; 30000 INJECTION SUBCUTANEOUS at 16:42

## 2021-06-04 RX ADMIN — DEXAMETHASONE 20 MG: 4 TABLET ORAL at 15:37

## 2021-06-04 RX ADMIN — ACETAMINOPHEN 650 MG: 325 TABLET ORAL at 15:37

## 2021-06-04 ASSESSMENT — PAIN SCALES - GENERAL: PAINLEVEL: MODERATE PAIN (4)

## 2021-06-04 NOTE — PROGRESS NOTES
Infusion Nursing Note:  June Ronquillo presents today for C1D8 Darazalex Faspro/Velcade.    Patient seen by provider today: No   present during visit today: Not Applicable.    Note: Patient reports feeling well. Denies nausea/vomiting. Denies fever/chills. No new concerns made. Otherwise well.    Instruction given to patient as per provider. Verbalized understanding.       Intravenous Access:  No Intravenous access/labs at this visit.    Treatment Conditions:  Lab Results   Component Value Date    HGB 12.9 06/04/2021     Lab Results   Component Value Date    WBC 7.5 06/04/2021      Lab Results   Component Value Date    ANEU 5.1 06/04/2021     Lab Results   Component Value Date     06/04/2021      Lab Results   Component Value Date     06/04/2021                   Lab Results   Component Value Date    POTASSIUM 3.1 06/04/2021           No results found for: MAG         Lab Results   Component Value Date    CR 0.93 06/04/2021                   Lab Results   Component Value Date    HARDEEP 8.3 06/04/2021                Lab Results   Component Value Date    BILITOTAL 0.5 06/04/2021           Lab Results   Component Value Date    ALBUMIN 3.6 06/04/2021                    Lab Results   Component Value Date    ALT 23 06/04/2021           Lab Results   Component Value Date    AST 15 06/04/2021       Results reviewed, labs MET treatment parameters, ok to proceed with treatment.    TORB: 6/4/21/0545/ Dr. Vazquez/Talia Chandler RN/ KELSEY 3.1 to replace as per protocol. PO Potassium Chloride 40 meq once.       Post Infusion Assessment:  Patient tolerated one Velcade injection on left upper arm without incident.  Patient tolerated one Darzalex Faspro injection on left lower abdomen without incident.  Blood return noted pre and post infusion.  Site patent and intact, free from redness, edema or discomfort.  No evidence of extravasations.       Discharge Plan:   Patient declined prescription  refills.  Discharge instructions reviewed with: Patient.  Patient and/or family verbalized understanding of discharge instructions and all questions answered.  AVS to patient via TUBET.  Patient will return 6/11/21 for next appointment.   Patient discharged in stable condition accompanied by: self.  Departure Mode: Ambulatory.      WILIAM PELAYO RN

## 2021-06-04 NOTE — NURSING NOTE
Chief Complaint   Patient presents with     Blood Draw     Labs drawn via VPT by RN in lab. VS taken.      Labs collected from venipuncture by RN. Vitals taken. Checked in for appointment(s).    Karla MEANS RN PHN BSN  BMT/Oncology Lab

## 2021-06-04 NOTE — PATIENT INSTRUCTIONS
Contact Numbers  [facility]: [phone]    After Hours:  [phone]  Triage: [phone]    Please call the [facility] Triage line if you experience a temperature greater than or equal to 100.5, shaking chills, have uncontrolled nausea, vomiting and/or diarrhea, dizziness, shortness of breath, chest pain, bleeding, unexplained bruising, or if you have any other new/concerning symptoms, questions or concerns.     If it is after hours, weekends, or holidays, please call the main hospital  at  [phone] and ask to speak to the Oncology doctor on call.     If you are having any concerning symptoms or wish to speak to a provider before your next infusion visit, please call your care coordinator or triage to notify them so we can adequately serve you.     If you need a refill on a narcotic prescription or other medication, please call triage before your infusion appointment.         June 2021 Sunday Monday Tuesday Wednesday Thursday Friday Saturday             1     2     3     4    LAB PERIPHERAL   2:30 PM   (15 min.)   [facility]   [facility]    ONC INFUSION 1 HR (60 MIN)   3:00 PM   (30 min.)    ONC INFUSION NURSE   [facility] 5       6     7     8     9     10     11    LAB PERIPHERAL   2:30 PM   (15 min.)   [facility]   [facility]    ONC INFUSION 1 HR (60 MIN)   3:00 PM   (60 min.)    ONC INFUSION NURSE   [facility] 12       13     14     15     16     17     18    LAB PERIPHERAL   2:30 PM   (15 min.)   [facility]   [facility]    ONC INFUSION 1 HR (60 MIN)   3:00 PM   (60 min.)    ONC INFUSION NURSE   [facility] 19       20     21     22     23     24     25  Happy Birthday!    LAB PERIPHERAL   2:30 PM   (15 min.)   [facility]   [facility]    ONC  INFUSION 1 HR (60 MIN)   3:00 PM   (60 min.)    ONC INFUSION NURSE   [facility] 26       27     28     29     30    VIDEO VISIT RETURN  12:15 PM   (30 min.)   Barbi Vazquez MD   [facility]                            July 2021 Sunday Monday Tuesday Wednesday Thursday Friday Saturday                       1     2    LAB PERIPHERAL   2:30 PM   (15 min.)   [facility] LAB DRAW   [facility]    ONC INFUSION 1 HR (60 MIN)   3:00 PM   (60 min.)    ONC INFUSION NURSE   [facility] 3       4     5     6     7     8     9     10       11     12     13     14     15     16     17       18     19     20     21     22     23     24       25     26     27     28     29     30     31                     Lab Results:  Recent Results (from the past 12 hour(s))   CBC with platelets differential    Collection Time: 06/04/21  2:34 PM   Result Value Ref Range    WBC 7.5 4.0 - 11.0 10e9/L    RBC Count 4.20 (L) 4.4 - 5.9 10e12/L    Hemoglobin 12.9 (L) 13.3 - 17.7 g/dL    Hematocrit 37.3 (L) 40.0 - 53.0 %    MCV 89 78 - 100 fl    MCH 30.7 26.5 - 33.0 pg    MCHC 34.6 31.5 - 36.5 g/dL    RDW 15.5 (H) 10.0 - 15.0 %    Platelet Count 331 150 - 450 10e9/L    Diff Method Automated Method     % Neutrophils 67.8 %    % Lymphocytes 12.4 %    % Monocytes 11.7 %    % Eosinophils 7.7 %    % Basophils 0.1 %    % Immature Granulocytes 0.3 %    Nucleated RBCs 0 0 /100    Absolute Neutrophil 5.1 1.6 - 8.3 10e9/L    Absolute Lymphocytes 0.9 0.8 - 5.3 10e9/L    Absolute Monocytes 0.9 0.0 - 1.3 10e9/L    Absolute Eosinophils 0.6 0.0 - 0.7 10e9/L    Absolute Basophils 0.0 0.0 - 0.2 10e9/L    Abs Immature Granulocytes 0.0 0 - 0.4 10e9/L    Absolute Nucleated RBC 0.0    Lactate Dehydrogenase    Collection Time: 06/04/21  2:34 PM   Result Value Ref Range    Lactate Dehydrogenase 70 (L) 85 - 227 U/L   Comprehensive metabolic panel (BMP  + Alb, Alk Phos, ALT, AST, Total. Bili, TP)    Collection Time: 06/04/21  2:34 PM   Result Value Ref Range    Sodium 136 133 - 144 mmol/L    Potassium 3.1 (L) 3.4 - 5.3 mmol/L    Chloride 104 94 - 109 mmol/L    Carbon Dioxide 24 20 - 32 mmol/L    Anion Gap 9 3 - 14 mmol/L    Glucose 152 (H) 70 - 99 mg/dL    Urea Nitrogen 14 7 - 30 mg/dL    Creatinine 0.93 0.66 - 1.25 mg/dL    GFR Estimate >90 >60 mL/min/[1.73_m2]    GFR Estimate If Black >90 >60 mL/min/[1.73_m2]    Calcium 8.3 (L) 8.5 - 10.1 mg/dL    Bilirubin Total 0.5 0.2 - 1.3 mg/dL    Albumin 3.6 3.4 - 5.0 g/dL    Protein Total 6.7 (L) 6.8 - 8.8 g/dL    Alkaline Phosphatase 71 40 - 150 U/L    ALT 23 0 - 70 U/L    AST 15 0 - 45 U/L

## 2021-06-08 ENCOUNTER — TELEPHONE (OUTPATIENT)
Dept: ONCOLOGY | Facility: CLINIC | Age: 51
End: 2021-06-08

## 2021-06-08 LAB
COPATH REPORT: NORMAL
COPATH REPORT: NORMAL

## 2021-06-08 RX ORDER — POTASSIUM CHLORIDE 750 MG/1
20 TABLET, EXTENDED RELEASE ORAL DAILY
Qty: 30 TABLET | Refills: 3 | Status: SHIPPED | OUTPATIENT
Start: 2021-06-08 | End: 2021-07-26

## 2021-06-08 NOTE — TELEPHONE ENCOUNTER
"Oral Chemotherapy Monitoring Program    Subjective/Objective:  June Ronquillo is a 50 year old male contacted by phone for a follow-up visit for oral chemotherapy. Pt confirms taking the appropriate dose of Revlimid, 25mg daily for 14 days, then 7 days off. C1D1 =5/25/21. Denies new or worsening side effects, missed doses, and recent hospital or ED visits. Patient has not had any recent medication changes. Pt states he has been staying hydrated and denies N/V/D/C.    Reviewed labs from 6/4. K=3.1, Ca=8.3.    ORAL CHEMOTHERAPY 5/14/2021 5/17/2021 6/8/2021   Assessment Type Other New Teach Initial Follow up   Diagnosis Code Multiple Myeloma Multiple Myeloma Multiple Myeloma   Providers Dr. George Vazquez   [facility]   Drug Name Revlimid (lenalidomide) Revlimid (lenalidomide) Revlimid (lenalidomide)   Dose 25 mg 25 mg 25 mg   Current Schedule Daily Daily Daily   Cycle Details 2 weeks on, 1 week off 2 weeks on, 1 week off 2 weeks on, 1 week off   Start Date of Last Cycle - - 5/25/2021   Planned next cycle start date - - 6/15/2021   Doses missed in last 2 weeks - - 0   Adherence Assessment - - Adherent   Adverse Effects - - No AE identified during assessment   Any new drug interactions? No No No   Is the dose as ordered appropriate for the patient? Yes Yes Yes   Since the last time we talked, have you been hospitalized or used the emergency room? - - No       Last PHQ-2 Score on record:   PHQ-2 ( 1999 Pfizer) 4/26/2019 1/22/2019   Q1: Little interest or pleasure in doing things 0 0   Q2: Feeling down, depressed or hopeless 0 0   PHQ-2 Score 0 0       Vitals:  BP:   BP Readings from Last 1 Encounters:   06/04/21 137/85     Wt Readings from Last 1 Encounters:   06/04/21 77.7 kg (171 lb 3.2 oz)     Estimated body surface area is 1.89 meters squared as calculated from the following:    Height as of 5/27/21: 1.651 m (5' 5\").    Weight as of 6/4/21: 77.7 kg (171 lb 3.2 " oz).    Labs:  _  Result Component Current Result Ref Range   Sodium 136 (6/4/2021) 133 - 144 mmol/L     _  Result Component Current Result Ref Range   Potassium 3.1 (L) (6/4/2021) 3.4 - 5.3 mmol/L     _  Result Component Current Result Ref Range   Calcium 8.3 (L) (6/4/2021) 8.5 - 10.1 mg/dL     No results found for Mag within last 30 days.     No results found for Phos within last 30 days.     _  Result Component Current Result Ref Range   Albumin 3.6 (6/4/2021) 3.4 - 5.0 g/dL     _  Result Component Current Result Ref Range   Urea Nitrogen 14 (6/4/2021) 7 - 30 mg/dL     _  Result Component Current Result Ref Range   Creatinine 0.93 (6/4/2021) 0.66 - 1.25 mg/dL     _  Result Component Current Result Ref Range   AST 15 (6/4/2021) 0 - 45 U/L     _  Result Component Current Result Ref Range   ALT 23 (6/4/2021) 0 - 70 U/L     _  Result Component Current Result Ref Range   Bilirubin Total 0.5 (6/4/2021) 0.2 - 1.3 mg/dL     _  Result Component Current Result Ref Range   WBC 7.5 (6/4/2021) 4.0 - 11.0 10e9/L     _  Result Component Current Result Ref Range   Hemoglobin 12.9 (L) (6/4/2021) 13.3 - 17.7 g/dL     _  Result Component Current Result Ref Range   Platelet Count 331 (6/4/2021) 150 - 450 10e9/L     _  Result Component Current Result Ref Range   Absolute Neutrophil 5.1 (6/4/2021) 1.6 - 8.3 10e9/L     Assessment/Plan:  Pt is tolerating Revlimid well so far. Labs from 6/4 are notable for low potassium and calcium.Per Dr Vazquez, pt to start KCl 20 mEq every day. Will monitor calcium and he should continue Vit D supplement. Next Revlimid cycle starts 6/15.    Follow-Up:  6/11: labs    Grace Myhre, BrittD  Hematology/Oncology Pharmacist  [facility]  [phone]

## 2021-06-09 DIAGNOSIS — C90.00 MULTIPLE MYELOMA NOT HAVING ACHIEVED REMISSION (H): Primary | ICD-10-CM

## 2021-06-09 RX ORDER — LORAZEPAM 2 MG/ML
0.5 INJECTION INTRAMUSCULAR EVERY 4 HOURS PRN
Status: CANCELLED
Start: 2021-06-29

## 2021-06-09 RX ORDER — DIPHENHYDRAMINE HYDROCHLORIDE 50 MG/ML
50 INJECTION INTRAMUSCULAR; INTRAVENOUS
Status: CANCELLED
Start: 2021-07-06

## 2021-06-09 RX ORDER — EPINEPHRINE 1 MG/ML
0.3 INJECTION, SOLUTION, CONCENTRATE INTRAVENOUS EVERY 5 MIN PRN
Status: CANCELLED | OUTPATIENT
Start: 2021-07-06

## 2021-06-09 RX ORDER — NALOXONE HYDROCHLORIDE 0.4 MG/ML
.1-.4 INJECTION, SOLUTION INTRAMUSCULAR; INTRAVENOUS; SUBCUTANEOUS
Status: CANCELLED | OUTPATIENT
Start: 2021-06-29

## 2021-06-09 RX ORDER — DIPHENHYDRAMINE HYDROCHLORIDE 50 MG/ML
50 INJECTION INTRAMUSCULAR; INTRAVENOUS
Status: CANCELLED
Start: 2021-07-13

## 2021-06-09 RX ORDER — ACETAMINOPHEN 325 MG/1
650 TABLET ORAL ONCE
Status: CANCELLED | OUTPATIENT
Start: 2021-07-06

## 2021-06-09 RX ORDER — HEPARIN SODIUM,PORCINE 10 UNIT/ML
5 VIAL (ML) INTRAVENOUS
Status: CANCELLED | OUTPATIENT
Start: 2021-07-06

## 2021-06-09 RX ORDER — DIPHENHYDRAMINE HCL 25 MG
50 CAPSULE ORAL ONCE
Status: CANCELLED | OUTPATIENT
Start: 2021-07-13

## 2021-06-09 RX ORDER — ALBUTEROL SULFATE 90 UG/1
1-2 AEROSOL, METERED RESPIRATORY (INHALATION)
Status: CANCELLED
Start: 2021-06-29

## 2021-06-09 RX ORDER — HEPARIN SODIUM (PORCINE) LOCK FLUSH IV SOLN 100 UNIT/ML 100 UNIT/ML
5 SOLUTION INTRAVENOUS
Status: CANCELLED | OUTPATIENT
Start: 2021-06-29

## 2021-06-09 RX ORDER — MEPERIDINE HYDROCHLORIDE 25 MG/ML
25 INJECTION INTRAMUSCULAR; INTRAVENOUS; SUBCUTANEOUS EVERY 30 MIN PRN
Status: CANCELLED | OUTPATIENT
Start: 2021-07-13

## 2021-06-09 RX ORDER — ACETAMINOPHEN 325 MG/1
650 TABLET ORAL ONCE
Status: CANCELLED | OUTPATIENT
Start: 2021-06-29

## 2021-06-09 RX ORDER — MEPERIDINE HYDROCHLORIDE 25 MG/ML
25 INJECTION INTRAMUSCULAR; INTRAVENOUS; SUBCUTANEOUS EVERY 30 MIN PRN
Status: CANCELLED | OUTPATIENT
Start: 2021-07-06

## 2021-06-09 RX ORDER — SODIUM CHLORIDE 9 MG/ML
1000 INJECTION, SOLUTION INTRAVENOUS CONTINUOUS PRN
Status: CANCELLED
Start: 2021-06-29

## 2021-06-09 RX ORDER — HEPARIN SODIUM (PORCINE) LOCK FLUSH IV SOLN 100 UNIT/ML 100 UNIT/ML
5 SOLUTION INTRAVENOUS
Status: CANCELLED | OUTPATIENT
Start: 2021-07-13

## 2021-06-09 RX ORDER — DIPHENHYDRAMINE HCL 25 MG
50 CAPSULE ORAL ONCE
Status: CANCELLED | OUTPATIENT
Start: 2021-06-29

## 2021-06-09 RX ORDER — ALBUTEROL SULFATE 0.83 MG/ML
2.5 SOLUTION RESPIRATORY (INHALATION)
Status: CANCELLED | OUTPATIENT
Start: 2021-06-29

## 2021-06-09 RX ORDER — SODIUM CHLORIDE 9 MG/ML
1000 INJECTION, SOLUTION INTRAVENOUS CONTINUOUS PRN
Status: CANCELLED
Start: 2021-07-06

## 2021-06-09 RX ORDER — ALBUTEROL SULFATE 0.83 MG/ML
2.5 SOLUTION RESPIRATORY (INHALATION)
Status: CANCELLED | OUTPATIENT
Start: 2021-07-06

## 2021-06-09 RX ORDER — MEPERIDINE HYDROCHLORIDE 25 MG/ML
25 INJECTION INTRAMUSCULAR; INTRAVENOUS; SUBCUTANEOUS EVERY 30 MIN PRN
Status: CANCELLED | OUTPATIENT
Start: 2021-06-29

## 2021-06-09 RX ORDER — LORAZEPAM 2 MG/ML
0.5 INJECTION INTRAMUSCULAR EVERY 4 HOURS PRN
Status: CANCELLED
Start: 2021-07-06

## 2021-06-09 RX ORDER — HEPARIN SODIUM,PORCINE 10 UNIT/ML
5 VIAL (ML) INTRAVENOUS
Status: CANCELLED | OUTPATIENT
Start: 2021-07-13

## 2021-06-09 RX ORDER — SODIUM CHLORIDE 9 MG/ML
1000 INJECTION, SOLUTION INTRAVENOUS CONTINUOUS PRN
Status: CANCELLED
Start: 2021-07-13

## 2021-06-09 RX ORDER — ACETAMINOPHEN 325 MG/1
650 TABLET ORAL ONCE
Status: CANCELLED | OUTPATIENT
Start: 2021-07-13

## 2021-06-09 RX ORDER — NALOXONE HYDROCHLORIDE 0.4 MG/ML
.1-.4 INJECTION, SOLUTION INTRAMUSCULAR; INTRAVENOUS; SUBCUTANEOUS
Status: CANCELLED | OUTPATIENT
Start: 2021-07-13

## 2021-06-09 RX ORDER — DIPHENHYDRAMINE HCL 25 MG
50 CAPSULE ORAL ONCE
Status: CANCELLED | OUTPATIENT
Start: 2021-07-06

## 2021-06-09 RX ORDER — HEPARIN SODIUM,PORCINE 10 UNIT/ML
5 VIAL (ML) INTRAVENOUS
Status: CANCELLED | OUTPATIENT
Start: 2021-06-29

## 2021-06-09 RX ORDER — ALBUTEROL SULFATE 90 UG/1
1-2 AEROSOL, METERED RESPIRATORY (INHALATION)
Status: CANCELLED
Start: 2021-07-13

## 2021-06-09 RX ORDER — DIPHENHYDRAMINE HYDROCHLORIDE 50 MG/ML
50 INJECTION INTRAMUSCULAR; INTRAVENOUS
Status: CANCELLED
Start: 2021-06-29

## 2021-06-09 RX ORDER — EPINEPHRINE 1 MG/ML
0.3 INJECTION, SOLUTION, CONCENTRATE INTRAVENOUS EVERY 5 MIN PRN
Status: CANCELLED | OUTPATIENT
Start: 2021-06-29

## 2021-06-09 RX ORDER — HEPARIN SODIUM (PORCINE) LOCK FLUSH IV SOLN 100 UNIT/ML 100 UNIT/ML
5 SOLUTION INTRAVENOUS
Status: CANCELLED | OUTPATIENT
Start: 2021-07-06

## 2021-06-09 RX ORDER — LORAZEPAM 2 MG/ML
0.5 INJECTION INTRAMUSCULAR EVERY 4 HOURS PRN
Status: CANCELLED
Start: 2021-07-13

## 2021-06-09 RX ORDER — EPINEPHRINE 1 MG/ML
0.3 INJECTION, SOLUTION, CONCENTRATE INTRAVENOUS EVERY 5 MIN PRN
Status: CANCELLED | OUTPATIENT
Start: 2021-07-13

## 2021-06-09 RX ORDER — DEXAMETHASONE 0.5 MG/1
20 TABLET ORAL ONCE
Status: CANCELLED | OUTPATIENT
Start: 2021-07-06

## 2021-06-09 RX ORDER — ALBUTEROL SULFATE 0.83 MG/ML
2.5 SOLUTION RESPIRATORY (INHALATION)
Status: CANCELLED | OUTPATIENT
Start: 2021-07-13

## 2021-06-09 RX ORDER — ALBUTEROL SULFATE 90 UG/1
1-2 AEROSOL, METERED RESPIRATORY (INHALATION)
Status: CANCELLED
Start: 2021-07-06

## 2021-06-09 RX ORDER — NALOXONE HYDROCHLORIDE 0.4 MG/ML
.1-.4 INJECTION, SOLUTION INTRAMUSCULAR; INTRAVENOUS; SUBCUTANEOUS
Status: CANCELLED | OUTPATIENT
Start: 2021-07-06

## 2021-06-09 RX ORDER — DEXAMETHASONE 0.5 MG/1
20 TABLET ORAL ONCE
Status: CANCELLED | OUTPATIENT
Start: 2021-06-29

## 2021-06-09 RX ORDER — DEXAMETHASONE 0.5 MG/1
20 TABLET ORAL ONCE
Status: CANCELLED | OUTPATIENT
Start: 2021-07-13

## 2021-06-10 ENCOUNTER — TELEPHONE (OUTPATIENT)
Dept: ONCOLOGY | Facility: CLINIC | Age: 51
End: 2021-06-10

## 2021-06-10 NOTE — TELEPHONE ENCOUNTER
Oral Chemotherapy Monitoring Program   Medication: Revlimid  Rx: 25mg PO daily on days 1 through 14 of 21 day cycle   Auth #: [id number]   Risk Category: Adult Male  Routine survey questions reviewed.   Rx to be Escribed to [facility]    [facility]  Oncology Pharmacy Liaison   [email or web address]  [phone] (phone  [phone] (fax

## 2021-06-11 ENCOUNTER — INFUSION THERAPY VISIT (OUTPATIENT)
Dept: ONCOLOGY | Facility: CLINIC | Age: 51
End: 2021-06-11
Attending: STUDENT IN AN ORGANIZED HEALTH CARE EDUCATION/TRAINING PROGRAM
Payer: COMMERCIAL

## 2021-06-11 VITALS
WEIGHT: 170.4 LBS | RESPIRATION RATE: 16 BRPM | DIASTOLIC BLOOD PRESSURE: 86 MMHG | BODY MASS INDEX: 28.36 KG/M2 | OXYGEN SATURATION: 98 % | HEART RATE: 92 BPM | SYSTOLIC BLOOD PRESSURE: 131 MMHG | TEMPERATURE: 98.2 F

## 2021-06-11 DIAGNOSIS — C90.00 MULTIPLE MYELOMA NOT HAVING ACHIEVED REMISSION (H): Primary | ICD-10-CM

## 2021-06-11 LAB
ALBUMIN SERPL-MCNC: 3.4 G/DL (ref 3.4–5)
ALP SERPL-CCNC: 88 U/L (ref 40–150)
ALT SERPL W P-5'-P-CCNC: 27 U/L (ref 0–70)
ANION GAP SERPL CALCULATED.3IONS-SCNC: 4 MMOL/L (ref 3–14)
AST SERPL W P-5'-P-CCNC: 18 U/L (ref 0–45)
BASOPHILS # BLD AUTO: 0 10E9/L (ref 0–0.2)
BASOPHILS NFR BLD AUTO: 0.2 %
BILIRUB SERPL-MCNC: 0.3 MG/DL (ref 0.2–1.3)
BUN SERPL-MCNC: 13 MG/DL (ref 7–30)
CALCIUM SERPL-MCNC: 8.4 MG/DL (ref 8.5–10.1)
CHLORIDE SERPL-SCNC: 108 MMOL/L (ref 94–109)
CO2 SERPL-SCNC: 25 MMOL/L (ref 20–32)
CREAT SERPL-MCNC: 1.04 MG/DL (ref 0.66–1.25)
DIFFERENTIAL METHOD BLD: ABNORMAL
EOSINOPHIL # BLD AUTO: 0.5 10E9/L (ref 0–0.7)
EOSINOPHIL NFR BLD AUTO: 10.3 %
ERYTHROCYTE [DISTWIDTH] IN BLOOD BY AUTOMATED COUNT: 15.7 % (ref 10–15)
GFR SERPL CREATININE-BSD FRML MDRD: 82 ML/MIN/{1.73_M2}
GLUCOSE SERPL-MCNC: 117 MG/DL (ref 70–99)
HCT VFR BLD AUTO: 36.1 % (ref 40–53)
HGB BLD-MCNC: 12.5 G/DL (ref 13.3–17.7)
IMM GRANULOCYTES # BLD: 0 10E9/L (ref 0–0.4)
IMM GRANULOCYTES NFR BLD: 0.2 %
LYMPHOCYTES # BLD AUTO: 1.3 10E9/L (ref 0.8–5.3)
LYMPHOCYTES NFR BLD AUTO: 27.5 %
MCH RBC QN AUTO: 30.6 PG (ref 26.5–33)
MCHC RBC AUTO-ENTMCNC: 34.6 G/DL (ref 31.5–36.5)
MCV RBC AUTO: 89 FL (ref 78–100)
MONOCYTES # BLD AUTO: 1.2 10E9/L (ref 0–1.3)
MONOCYTES NFR BLD AUTO: 25.6 %
NEUTROPHILS # BLD AUTO: 1.7 10E9/L (ref 1.6–8.3)
NEUTROPHILS NFR BLD AUTO: 36.2 %
NRBC # BLD AUTO: 0 10*3/UL
NRBC BLD AUTO-RTO: 0 /100
PLATELET # BLD AUTO: 367 10E9/L (ref 150–450)
PLATELET # BLD EST: ABNORMAL 10*3/UL
POTASSIUM SERPL-SCNC: 3.3 MMOL/L (ref 3.4–5.3)
PROT SERPL-MCNC: 6.5 G/DL (ref 6.8–8.8)
RBC # BLD AUTO: 4.08 10E12/L (ref 4.4–5.9)
SODIUM SERPL-SCNC: 137 MMOL/L (ref 133–144)
WBC # BLD AUTO: 4.8 10E9/L (ref 4–11)

## 2021-06-11 PROCEDURE — 250N000012 HC RX MED GY IP 250 OP 636 PS 637: Performed by: STUDENT IN AN ORGANIZED HEALTH CARE EDUCATION/TRAINING PROGRAM

## 2021-06-11 PROCEDURE — 250N000011 HC RX IP 250 OP 636: Performed by: STUDENT IN AN ORGANIZED HEALTH CARE EDUCATION/TRAINING PROGRAM

## 2021-06-11 PROCEDURE — 80053 COMPREHEN METABOLIC PANEL: CPT | Performed by: STUDENT IN AN ORGANIZED HEALTH CARE EDUCATION/TRAINING PROGRAM

## 2021-06-11 PROCEDURE — 96401 CHEMO ANTI-NEOPL SQ/IM: CPT

## 2021-06-11 PROCEDURE — 250N000013 HC RX MED GY IP 250 OP 250 PS 637: Performed by: STUDENT IN AN ORGANIZED HEALTH CARE EDUCATION/TRAINING PROGRAM

## 2021-06-11 PROCEDURE — 36415 COLL VENOUS BLD VENIPUNCTURE: CPT

## 2021-06-11 PROCEDURE — 85025 COMPLETE CBC W/AUTO DIFF WBC: CPT | Performed by: STUDENT IN AN ORGANIZED HEALTH CARE EDUCATION/TRAINING PROGRAM

## 2021-06-11 RX ORDER — ACETAMINOPHEN 325 MG/1
650 TABLET ORAL ONCE
Status: COMPLETED | OUTPATIENT
Start: 2021-06-11 | End: 2021-06-11

## 2021-06-11 RX ORDER — DIPHENHYDRAMINE HCL 25 MG
50 CAPSULE ORAL ONCE
Status: COMPLETED | OUTPATIENT
Start: 2021-06-11 | End: 2021-06-11

## 2021-06-11 RX ORDER — MONTELUKAST SODIUM 10 MG/1
10 TABLET ORAL ONCE
Status: COMPLETED | OUTPATIENT
Start: 2021-06-11 | End: 2021-06-11

## 2021-06-11 RX ORDER — DEXAMETHASONE 4 MG/1
20 TABLET ORAL ONCE
Status: COMPLETED | OUTPATIENT
Start: 2021-06-11 | End: 2021-06-11

## 2021-06-11 RX ADMIN — MONTELUKAST SODIUM 10 MG: 10 TABLET, COATED ORAL at 15:51

## 2021-06-11 RX ADMIN — ACETAMINOPHEN 650 MG: 325 TABLET ORAL at 15:51

## 2021-06-11 RX ADMIN — DIPHENHYDRAMINE HYDROCHLORIDE 50 MG: 25 CAPSULE ORAL at 15:50

## 2021-06-11 RX ADMIN — DARATUMUMAB AND HYALURONIDASE-FIHJ (HUMAN RECOMBINANT) 1800 MG: 1800; 30000 INJECTION SUBCUTANEOUS at 16:31

## 2021-06-11 RX ADMIN — DEXAMETHASONE 20 MG: 4 TABLET ORAL at 15:50

## 2021-06-11 ASSESSMENT — PAIN SCALES - GENERAL: PAINLEVEL: MODERATE PAIN (4)

## 2021-06-11 NOTE — NURSING NOTE
Chief Complaint   Patient presents with     Blood Draw     Labs drawn via  by RN in lab. VS taken.      Labs collected from venipuncture by RN. Vitals taken. Checked in for appointment(s).    Nunu Santoyo RN

## 2021-06-11 NOTE — PATIENT INSTRUCTIONS
Contact Numbers    [facility] Main Line/Triage and after hours / weekends / holidays: [phone]      Please call the triage or after hours line if you experience a temperature greater than or equal to 100.5, shaking chills, have uncontrolled nausea, vomiting and/or diarrhea, dizziness, shortness of breath, chest pain, bleeding, unexplained bruising, or if you have any other new/concerning symptoms, questions or concerns.      If you are having any concerning symptoms or wish to speak to a provider before your next infusion visit, please call your care coordinator or triage to notify them so we can adequately serve you.     If you need a refill on a narcotic prescription or other medication, please call before your infusion appointment.

## 2021-06-11 NOTE — PROGRESS NOTES
Infusion Nursing Note:  June Ronquillo presents today for Cycle 1 Day 15 Darzalex Faspro.   Patient seen by provider today: No   present during visit today: Not Applicable.    Note: Logan presents to infusion today reporting generalized pain in his bones and joints rating 4/10. He states this pain is not new, has been ongoing for a year prior to diagnosis, but it was been worsening lately. He takes Tylenol with no relief. He is still able to complete his daily activities, but they are becoming more difficult to complete with his discomfort. I encouraged him to reach out to Dr. Vazquez to discuss further options for pain control. Writer sent a message to Dr. Vazquez/RN Care Coordinator as well. Patient denies any other concerns or symptoms today and states he would like to proceed with treatment.       Intravenous Access:  No Intravenous access at this visit.    Treatment Conditions:  Lab Results   Component Value Date    HGB 12.5 06/11/2021     Lab Results   Component Value Date    WBC 4.8 06/11/2021      Lab Results   Component Value Date    ANEU 1.7 06/11/2021     Lab Results   Component Value Date     06/11/2021      Lab Results   Component Value Date     06/11/2021                   Lab Results   Component Value Date    POTASSIUM 3.3 06/11/2021           No results found for: MAG         Lab Results   Component Value Date    CR 1.04 06/11/2021                   Lab Results   Component Value Date    HARDEEP 8.4 06/11/2021                Lab Results   Component Value Date    BILITOTAL 0.3 06/11/2021           Lab Results   Component Value Date    ALBUMIN 3.4 06/11/2021                    Lab Results   Component Value Date    ALT 27 06/11/2021           Lab Results   Component Value Date    AST 18 06/11/2021       Results reviewed, labs MET treatment parameters, ok to proceed with treatment.      Post Infusion Assessment:  Patient tolerated one Darzalex Faspro injection without incident. to  the right lower abdomen.        Discharge Plan:   Prescription refills given for Vitamin D.  Discharge instructions reviewed with: Patient.  Patient and/or family verbalized understanding of discharge instructions and all questions answered.  AVS to patient via [email or web address].  Patient will return 6/18/21 for next appointment.   Patient discharged in stable condition accompanied by: self.  Departure Mode: Ambulatory.      La Cobb RN

## 2021-06-14 DIAGNOSIS — C90.00 MULTIPLE MYELOMA NOT HAVING ACHIEVED REMISSION (H): Primary | ICD-10-CM

## 2021-06-14 RX ORDER — LENALIDOMIDE 25 MG/1
25 CAPSULE ORAL DAILY
Qty: 14 CAPSULE | Refills: 0 | Status: SHIPPED | OUTPATIENT
Start: 2021-06-14 | End: 2021-06-29

## 2021-06-15 DIAGNOSIS — C90.00 MULTIPLE MYELOMA NOT HAVING ACHIEVED REMISSION (H): Primary | ICD-10-CM

## 2021-06-18 ENCOUNTER — INFUSION THERAPY VISIT (OUTPATIENT)
Dept: ONCOLOGY | Facility: CLINIC | Age: 51
End: 2021-06-18
Attending: STUDENT IN AN ORGANIZED HEALTH CARE EDUCATION/TRAINING PROGRAM
Payer: COMMERCIAL

## 2021-06-18 ENCOUNTER — APPOINTMENT (OUTPATIENT)
Dept: LAB | Facility: CLINIC | Age: 51
End: 2021-06-18
Attending: STUDENT IN AN ORGANIZED HEALTH CARE EDUCATION/TRAINING PROGRAM
Payer: COMMERCIAL

## 2021-06-18 VITALS
TEMPERATURE: 98 F | BODY MASS INDEX: 27.79 KG/M2 | WEIGHT: 167 LBS | DIASTOLIC BLOOD PRESSURE: 85 MMHG | RESPIRATION RATE: 16 BRPM | HEART RATE: 95 BPM | OXYGEN SATURATION: 98 % | SYSTOLIC BLOOD PRESSURE: 137 MMHG

## 2021-06-18 DIAGNOSIS — C90.00 MULTIPLE MYELOMA NOT HAVING ACHIEVED REMISSION (H): Primary | ICD-10-CM

## 2021-06-18 LAB
ALBUMIN SERPL-MCNC: 3.3 G/DL (ref 3.4–5)
ALP SERPL-CCNC: 103 U/L (ref 40–150)
ALT SERPL W P-5'-P-CCNC: 30 U/L (ref 0–70)
ANION GAP SERPL CALCULATED.3IONS-SCNC: 9 MMOL/L (ref 3–14)
AST SERPL W P-5'-P-CCNC: 20 U/L (ref 0–45)
BASOPHILS # BLD AUTO: 0 10E9/L (ref 0–0.2)
BASOPHILS NFR BLD AUTO: 0.1 %
BILIRUB SERPL-MCNC: 0.4 MG/DL (ref 0.2–1.3)
BUN SERPL-MCNC: 14 MG/DL (ref 7–30)
CALCIUM SERPL-MCNC: 8.2 MG/DL (ref 8.5–10.1)
CHLORIDE SERPL-SCNC: 104 MMOL/L (ref 94–109)
CO2 SERPL-SCNC: 24 MMOL/L (ref 20–32)
CREAT SERPL-MCNC: 0.89 MG/DL (ref 0.66–1.25)
DIFFERENTIAL METHOD BLD: ABNORMAL
EOSINOPHIL # BLD AUTO: 0.4 10E9/L (ref 0–0.7)
EOSINOPHIL NFR BLD AUTO: 5.5 %
ERYTHROCYTE [DISTWIDTH] IN BLOOD BY AUTOMATED COUNT: 16.1 % (ref 10–15)
GFR SERPL CREATININE-BSD FRML MDRD: >90 ML/MIN/{1.73_M2}
GLUCOSE SERPL-MCNC: 116 MG/DL (ref 70–99)
HCT VFR BLD AUTO: 36.6 % (ref 40–53)
HGB BLD-MCNC: 12.5 G/DL (ref 13.3–17.7)
IMM GRANULOCYTES # BLD: 0.1 10E9/L (ref 0–0.4)
IMM GRANULOCYTES NFR BLD: 1.5 %
LYMPHOCYTES # BLD AUTO: 1.1 10E9/L (ref 0.8–5.3)
LYMPHOCYTES NFR BLD AUTO: 16 %
MCH RBC QN AUTO: 30.6 PG (ref 26.5–33)
MCHC RBC AUTO-ENTMCNC: 34.2 G/DL (ref 31.5–36.5)
MCV RBC AUTO: 90 FL (ref 78–100)
MONOCYTES # BLD AUTO: 1 10E9/L (ref 0–1.3)
MONOCYTES NFR BLD AUTO: 14.8 %
NEUTROPHILS # BLD AUTO: 4.3 10E9/L (ref 1.6–8.3)
NEUTROPHILS NFR BLD AUTO: 62.1 %
NRBC # BLD AUTO: 0 10*3/UL
NRBC BLD AUTO-RTO: 0 /100
PLATELET # BLD AUTO: 490 10E9/L (ref 150–450)
POTASSIUM SERPL-SCNC: 3.3 MMOL/L (ref 3.4–5.3)
PROT SERPL-MCNC: 6.6 G/DL (ref 6.8–8.8)
RBC # BLD AUTO: 4.08 10E12/L (ref 4.4–5.9)
SODIUM SERPL-SCNC: 137 MMOL/L (ref 133–144)
WBC # BLD AUTO: 6.9 10E9/L (ref 4–11)

## 2021-06-18 PROCEDURE — 82784 ASSAY IGA/IGD/IGG/IGM EACH: CPT | Performed by: STUDENT IN AN ORGANIZED HEALTH CARE EDUCATION/TRAINING PROGRAM

## 2021-06-18 PROCEDURE — 85025 COMPLETE CBC W/AUTO DIFF WBC: CPT | Performed by: STUDENT IN AN ORGANIZED HEALTH CARE EDUCATION/TRAINING PROGRAM

## 2021-06-18 PROCEDURE — 80053 COMPREHEN METABOLIC PANEL: CPT | Performed by: STUDENT IN AN ORGANIZED HEALTH CARE EDUCATION/TRAINING PROGRAM

## 2021-06-18 PROCEDURE — 84165 PROTEIN E-PHORESIS SERUM: CPT | Mod: TC | Performed by: STUDENT IN AN ORGANIZED HEALTH CARE EDUCATION/TRAINING PROGRAM

## 2021-06-18 PROCEDURE — 250N000012 HC RX MED GY IP 250 OP 636 PS 637: Performed by: STUDENT IN AN ORGANIZED HEALTH CARE EDUCATION/TRAINING PROGRAM

## 2021-06-18 PROCEDURE — 36415 COLL VENOUS BLD VENIPUNCTURE: CPT

## 2021-06-18 PROCEDURE — 86334 IMMUNOFIX E-PHORESIS SERUM: CPT | Mod: 26 | Performed by: PATHOLOGY

## 2021-06-18 PROCEDURE — 83883 ASSAY NEPHELOMETRY NOT SPEC: CPT | Performed by: STUDENT IN AN ORGANIZED HEALTH CARE EDUCATION/TRAINING PROGRAM

## 2021-06-18 PROCEDURE — 999N001036 HC STATISTIC TOTAL PROTEIN: Performed by: STUDENT IN AN ORGANIZED HEALTH CARE EDUCATION/TRAINING PROGRAM

## 2021-06-18 PROCEDURE — 84165 PROTEIN E-PHORESIS SERUM: CPT | Mod: 26 | Performed by: PATHOLOGY

## 2021-06-18 PROCEDURE — 96401 CHEMO ANTI-NEOPL SQ/IM: CPT | Mod: 59

## 2021-06-18 PROCEDURE — 86334 IMMUNOFIX E-PHORESIS SERUM: CPT | Mod: TC | Performed by: STUDENT IN AN ORGANIZED HEALTH CARE EDUCATION/TRAINING PROGRAM

## 2021-06-18 PROCEDURE — 250N000013 HC RX MED GY IP 250 OP 250 PS 637: Performed by: STUDENT IN AN ORGANIZED HEALTH CARE EDUCATION/TRAINING PROGRAM

## 2021-06-18 PROCEDURE — 250N000011 HC RX IP 250 OP 636: Performed by: STUDENT IN AN ORGANIZED HEALTH CARE EDUCATION/TRAINING PROGRAM

## 2021-06-18 RX ORDER — DIPHENHYDRAMINE HCL 25 MG
50 CAPSULE ORAL ONCE
Status: COMPLETED | OUTPATIENT
Start: 2021-06-18 | End: 2021-06-18

## 2021-06-18 RX ORDER — DEXAMETHASONE 4 MG/1
TABLET ORAL
Qty: 15 TABLET | Refills: 0 | Status: SHIPPED | OUTPATIENT
Start: 2021-06-18 | End: 2021-08-06

## 2021-06-18 RX ORDER — ACETAMINOPHEN 325 MG/1
650 TABLET ORAL ONCE
Status: COMPLETED | OUTPATIENT
Start: 2021-06-18 | End: 2021-06-18

## 2021-06-18 RX ORDER — DEXAMETHASONE 4 MG/1
20 TABLET ORAL ONCE
Status: COMPLETED | OUTPATIENT
Start: 2021-06-18 | End: 2021-06-18

## 2021-06-18 RX ADMIN — DARATUMUMAB AND HYALURONIDASE-FIHJ (HUMAN RECOMBINANT) 1800 MG: 1800; 30000 INJECTION SUBCUTANEOUS at 15:55

## 2021-06-18 RX ADMIN — DIPHENHYDRAMINE HYDROCHLORIDE 50 MG: 25 CAPSULE ORAL at 15:03

## 2021-06-18 RX ADMIN — ACETAMINOPHEN 650 MG: 325 TABLET ORAL at 15:03

## 2021-06-18 RX ADMIN — BORTEZOMIB 2.5 MG: 3.5 INJECTION, POWDER, LYOPHILIZED, FOR SOLUTION INTRAVENOUS; SUBCUTANEOUS at 15:29

## 2021-06-18 RX ADMIN — DEXAMETHASONE 20 MG: 4 TABLET ORAL at 15:03

## 2021-06-18 ASSESSMENT — PAIN SCALES - GENERAL: PAINLEVEL: NO PAIN (0)

## 2021-06-18 NOTE — NURSING NOTE
Chief Complaint   Patient presents with     Blood Draw     labs drawn with vpt by rn.  vs taken     Labs drawn with vpt by rn.  Pt tolerated well.  VS taken.  Pt checked in for next appt.    Aubrie Fleming RN

## 2021-06-18 NOTE — PATIENT INSTRUCTIONS
Contact Numbers    [facility] Main Line: [phone]  [facility] Triage and after hours / weekends / holidays: [phone]      Please call the triage or after hours line if you experience a temperature greater than or equal to 100.4, shaking chills, have uncontrolled nausea, vomiting and/or diarrhea, dizziness, shortness of breath, chest pain, bleeding, unexplained bruising, or if you have any other new/concerning symptoms, questions or concerns.      If you are having any concerning symptoms or wish to speak to a provider before your next infusion visit, please call your care coordinator or triage to notify them so we can adequately serve you.     If you need a refill on a narcotic prescription or other medication, please call before your infusion appointment.       June 2021 Sunday Monday Tuesday Wednesday Thursday Friday Saturday             1     2     3     4    LAB PERIPHERAL   2:30 PM   (15 min.)    [facility]   [facility]    ONC INFUSION 1 HR (60 MIN)   3:00 PM   (30 min.)    ONC INFUSION NURSE   [facility] 5       6     7     8     9     10     11    LAB PERIPHERAL   2:30 PM   (15 min.)   [facility]   [facility]    ONC INFUSION 1 HR (60 MIN)   3:00 PM   (60 min.)    ONC INFUSION NURSE   [facility] 12       13     14     15     16     17     18    LAB PERIPHERAL   2:30 PM   (15 min.)   [facility]   [facility]    ONC INFUSION 1 HR (60 MIN)   3:00 PM   (60 min.)    ONC INFUSION NURSE   [facility] 19       20     21     22     23     24     25  Happy Birthday!    LAB PERIPHERAL   2:30 PM   (15 min.)   [facility]   [facility]    ONC INFUSION 1 HR (60 MIN)   3:00 PM   (60 min.)    ONC INFUSION NURSE   [facility] 26       27     28     29      30    VIDEO VISIT RETURN  12:15 PM   (30 min.)   Barbi Vazquez MD   [facility]                            July 2021 Sunday Monday Tuesday Wednesday Thursday Friday Saturday                       1    LAB PERIPHERAL   2:15 PM   (15 min.)   [facility] LAB DRAW   [facility]    ONC INFUSION 1 HR (60 MIN)   3:00 PM   (60 min.)    ONC INFUSION NURSE   [facility] 2     3       4     5     6     7     8     9     10       11     12     13     14     15     16     17       18     19     20     21     22     23     24       25     26     27     28     29     30     31                    Recent Results (from the past 24 hour(s))   CBC with platelets differential    Collection Time: 06/18/21  2:44 PM   Result Value Ref Range    WBC 6.9 4.0 - 11.0 10e9/L    RBC Count 4.08 (L) 4.4 - 5.9 10e12/L    Hemoglobin 12.5 (L) 13.3 - 17.7 g/dL    Hematocrit 36.6 (L) 40.0 - 53.0 %    MCV 90 78 - 100 fl    MCH 30.6 26.5 - 33.0 pg    MCHC 34.2 31.5 - 36.5 g/dL    RDW 16.1 (H) 10.0 - 15.0 %    Platelet Count 490 (H) 150 - 450 10e9/L    Diff Method Automated Method     % Neutrophils 62.1 %    % Lymphocytes 16.0 %    % Monocytes 14.8 %    % Eosinophils 5.5 %    % Basophils 0.1 %    % Immature Granulocytes 1.5 %    Nucleated RBCs 0 0 /100    Absolute Neutrophil 4.3 1.6 - 8.3 10e9/L    Absolute Lymphocytes 1.1 0.8 - 5.3 10e9/L    Absolute Monocytes 1.0 0.0 - 1.3 10e9/L    Absolute Eosinophils 0.4 0.0 - 0.7 10e9/L    Absolute Basophils 0.0 0.0 - 0.2 10e9/L    Abs Immature Granulocytes 0.1 0 - 0.4 10e9/L    Absolute Nucleated RBC 0.0    Comprehensive metabolic panel    Collection Time: 06/18/21  2:44 PM   Result Value Ref Range    Sodium 137 133 - 144 mmol/L    Potassium 3.3 (L) 3.4 - 5.3 mmol/L    Chloride 104 94 - 109 mmol/L    Carbon Dioxide 24 20 - 32 mmol/L    Anion Gap 9 3 - 14 mmol/L    Glucose 116 (H) 70 - 99 mg/dL    Urea Nitrogen 14 7 - 30  mg/dL    Creatinine 0.89 0.66 - 1.25 mg/dL    GFR Estimate >90 >60 mL/min/[1.73_m2]    GFR Estimate If Black >90 >60 mL/min/[1.73_m2]    Calcium 8.2 (L) 8.5 - 10.1 mg/dL    Bilirubin Total 0.4 0.2 - 1.3 mg/dL    Albumin 3.3 (L) 3.4 - 5.0 g/dL    Protein Total 6.6 (L) 6.8 - 8.8 g/dL    Alkaline Phosphatase 103 40 - 150 U/L    ALT 30 0 - 70 U/L    AST 20 0 - 45 U/L

## 2021-06-18 NOTE — PROGRESS NOTES
Infusion Nursing Note:  June Ronquillo presents today for Cycle 2 Day 1 Velcade/Darzalex Faspro.    Patient seen by provider today: No   present during visit today: Not Applicable.    Note: pt presents to infusion room today feeling generally well. Pt does report some fatigue that is limiting usual levels of activity, but is trying to balance rest with activity. Pt also reports a loss in appetite, but has not needed to take any antiemetics and is trying to keep up with food and fluid intake.     Potassium level slightly low today; pt is already taking a potassium supplement at home.       Pain: denies    Intravenous Access:  Labs drawn without difficulty.    Treatment Conditions:  Lab Results   Component Value Date    HGB 12.5 06/18/2021     Lab Results   Component Value Date    WBC 6.9 06/18/2021      Lab Results   Component Value Date    ANEU 4.3 06/18/2021     Lab Results   Component Value Date     06/18/2021      Lab Results   Component Value Date     06/18/2021                   Lab Results   Component Value Date    POTASSIUM 3.3 06/18/2021           No results found for: MAG         Lab Results   Component Value Date    CR 0.89 06/18/2021                   Lab Results   Component Value Date    HARDEEP 8.2 06/18/2021                Lab Results   Component Value Date    BILITOTAL 0.4 06/18/2021           Lab Results   Component Value Date    ALBUMIN 3.3 06/18/2021                    Lab Results   Component Value Date    ALT 30 06/18/2021           Lab Results   Component Value Date    AST 20 06/18/2021       Results reviewed, labs MET treatment parameters, ok to proceed with treatment.      Post Infusion Assessment:  Patient tolerated Velcade injection to left arm without incident.   Patient tolerated Darzalex Faspro injection to left abdomen without incident.     Discharge Plan:   Prescription refills given for Decadron. Pt states having Revlimid at home.   Copy of AVS reviewed with  patient and/or family.  Patient will return 6/25 for next appointment.  Patient discharged in stable condition accompanied by: self.  Departure Mode: Ambulatory.    NORIS CASANOVA RN     No

## 2021-06-21 DIAGNOSIS — C90.00 MULTIPLE MYELOMA NOT HAVING ACHIEVED REMISSION (H): Primary | ICD-10-CM

## 2021-06-21 LAB
ALBUMIN SERPL ELPH-MCNC: 3.7 G/DL (ref 3.7–5.1)
ALPHA1 GLOB SERPL ELPH-MCNC: 0.4 G/DL (ref 0.2–0.4)
ALPHA2 GLOB SERPL ELPH-MCNC: 0.8 G/DL (ref 0.5–0.9)
B-GLOBULIN SERPL ELPH-MCNC: 0.8 G/DL (ref 0.6–1)
GAMMA GLOB SERPL ELPH-MCNC: 0.4 G/DL (ref 0.7–1.6)
IGA SERPL-MCNC: 41 MG/DL (ref 84–499)
IGG SERPL-MCNC: 452 MG/DL (ref 610–1616)
IGM SERPL-MCNC: 14 MG/DL (ref 35–242)
KAPPA LC UR-MCNC: 0.49 MG/DL (ref 0.33–1.94)
KAPPA LC/LAMBDA SER: 0.68 {RATIO} (ref 0.26–1.65)
LAMBDA LC SERPL-MCNC: 0.72 MG/DL (ref 0.57–2.63)
M PROTEIN SERPL ELPH-MCNC: 0.1 G/DL
PROT PATTERN SERPL ELPH-IMP: ABNORMAL
PROT PATTERN SERPL IFE-IMP: ABNORMAL

## 2021-06-21 RX ORDER — DIPHENHYDRAMINE HCL 25 MG
50 CAPSULE ORAL ONCE
Status: CANCELLED | OUTPATIENT
Start: 2021-07-23

## 2021-06-21 RX ORDER — ACETAMINOPHEN 325 MG/1
650 TABLET ORAL ONCE
Status: CANCELLED | OUTPATIENT
Start: 2021-07-16

## 2021-06-21 RX ORDER — MEPERIDINE HYDROCHLORIDE 25 MG/ML
25 INJECTION INTRAMUSCULAR; INTRAVENOUS; SUBCUTANEOUS EVERY 30 MIN PRN
Status: CANCELLED | OUTPATIENT
Start: 2021-07-23

## 2021-06-21 RX ORDER — HEPARIN SODIUM (PORCINE) LOCK FLUSH IV SOLN 100 UNIT/ML 100 UNIT/ML
5 SOLUTION INTRAVENOUS
Status: CANCELLED | OUTPATIENT
Start: 2021-07-09

## 2021-06-21 RX ORDER — ALBUTEROL SULFATE 90 UG/1
1-2 AEROSOL, METERED RESPIRATORY (INHALATION)
Status: CANCELLED
Start: 2021-07-16

## 2021-06-21 RX ORDER — ALBUTEROL SULFATE 0.83 MG/ML
2.5 SOLUTION RESPIRATORY (INHALATION)
Status: CANCELLED | OUTPATIENT
Start: 2021-07-23

## 2021-06-21 RX ORDER — SODIUM CHLORIDE 9 MG/ML
1000 INJECTION, SOLUTION INTRAVENOUS CONTINUOUS PRN
Status: CANCELLED
Start: 2021-07-23

## 2021-06-21 RX ORDER — HEPARIN SODIUM,PORCINE 10 UNIT/ML
5 VIAL (ML) INTRAVENOUS
Status: CANCELLED | OUTPATIENT
Start: 2021-07-16

## 2021-06-21 RX ORDER — DIPHENHYDRAMINE HYDROCHLORIDE 50 MG/ML
50 INJECTION INTRAMUSCULAR; INTRAVENOUS
Status: CANCELLED
Start: 2021-07-09

## 2021-06-21 RX ORDER — DEXAMETHASONE 0.5 MG/1
20 TABLET ORAL ONCE
Status: CANCELLED | OUTPATIENT
Start: 2021-07-23

## 2021-06-21 RX ORDER — DEXAMETHASONE 0.5 MG/1
20 TABLET ORAL ONCE
Status: CANCELLED | OUTPATIENT
Start: 2021-07-09

## 2021-06-21 RX ORDER — EPINEPHRINE 1 MG/ML
0.3 INJECTION, SOLUTION, CONCENTRATE INTRAVENOUS EVERY 5 MIN PRN
Status: CANCELLED | OUTPATIENT
Start: 2021-07-16

## 2021-06-21 RX ORDER — DIPHENHYDRAMINE HYDROCHLORIDE 50 MG/ML
50 INJECTION INTRAMUSCULAR; INTRAVENOUS
Status: CANCELLED
Start: 2021-07-23

## 2021-06-21 RX ORDER — ALBUTEROL SULFATE 0.83 MG/ML
2.5 SOLUTION RESPIRATORY (INHALATION)
Status: CANCELLED | OUTPATIENT
Start: 2021-07-16

## 2021-06-21 RX ORDER — ACETAMINOPHEN 325 MG/1
650 TABLET ORAL ONCE
Status: CANCELLED | OUTPATIENT
Start: 2021-07-09

## 2021-06-21 RX ORDER — DIPHENHYDRAMINE HYDROCHLORIDE 50 MG/ML
50 INJECTION INTRAMUSCULAR; INTRAVENOUS
Status: CANCELLED
Start: 2021-07-16

## 2021-06-21 RX ORDER — ACETAMINOPHEN 325 MG/1
650 TABLET ORAL ONCE
Status: CANCELLED | OUTPATIENT
Start: 2021-07-23

## 2021-06-21 RX ORDER — ALBUTEROL SULFATE 90 UG/1
1-2 AEROSOL, METERED RESPIRATORY (INHALATION)
Status: CANCELLED
Start: 2021-07-23

## 2021-06-21 RX ORDER — LORAZEPAM 2 MG/ML
0.5 INJECTION INTRAMUSCULAR EVERY 4 HOURS PRN
Status: CANCELLED
Start: 2021-07-09

## 2021-06-21 RX ORDER — DEXAMETHASONE 0.5 MG/1
20 TABLET ORAL ONCE
Status: CANCELLED | OUTPATIENT
Start: 2021-07-16

## 2021-06-21 RX ORDER — ALBUTEROL SULFATE 0.83 MG/ML
2.5 SOLUTION RESPIRATORY (INHALATION)
Status: CANCELLED | OUTPATIENT
Start: 2021-07-09

## 2021-06-21 RX ORDER — HEPARIN SODIUM,PORCINE 10 UNIT/ML
5 VIAL (ML) INTRAVENOUS
Status: CANCELLED | OUTPATIENT
Start: 2021-07-23

## 2021-06-21 RX ORDER — NALOXONE HYDROCHLORIDE 0.4 MG/ML
.1-.4 INJECTION, SOLUTION INTRAMUSCULAR; INTRAVENOUS; SUBCUTANEOUS
Status: CANCELLED | OUTPATIENT
Start: 2021-07-09

## 2021-06-21 RX ORDER — NALOXONE HYDROCHLORIDE 0.4 MG/ML
.1-.4 INJECTION, SOLUTION INTRAMUSCULAR; INTRAVENOUS; SUBCUTANEOUS
Status: CANCELLED | OUTPATIENT
Start: 2021-07-16

## 2021-06-21 RX ORDER — HEPARIN SODIUM,PORCINE 10 UNIT/ML
5 VIAL (ML) INTRAVENOUS
Status: CANCELLED | OUTPATIENT
Start: 2021-07-09

## 2021-06-21 RX ORDER — ALBUTEROL SULFATE 90 UG/1
1-2 AEROSOL, METERED RESPIRATORY (INHALATION)
Status: CANCELLED
Start: 2021-07-09

## 2021-06-21 RX ORDER — EPINEPHRINE 1 MG/ML
0.3 INJECTION, SOLUTION, CONCENTRATE INTRAVENOUS EVERY 5 MIN PRN
Status: CANCELLED | OUTPATIENT
Start: 2021-07-23

## 2021-06-21 RX ORDER — MEPERIDINE HYDROCHLORIDE 25 MG/ML
25 INJECTION INTRAMUSCULAR; INTRAVENOUS; SUBCUTANEOUS EVERY 30 MIN PRN
Status: CANCELLED | OUTPATIENT
Start: 2021-07-16

## 2021-06-21 RX ORDER — NALOXONE HYDROCHLORIDE 0.4 MG/ML
.1-.4 INJECTION, SOLUTION INTRAMUSCULAR; INTRAVENOUS; SUBCUTANEOUS
Status: CANCELLED | OUTPATIENT
Start: 2021-07-23

## 2021-06-21 RX ORDER — HEPARIN SODIUM (PORCINE) LOCK FLUSH IV SOLN 100 UNIT/ML 100 UNIT/ML
5 SOLUTION INTRAVENOUS
Status: CANCELLED | OUTPATIENT
Start: 2021-07-23

## 2021-06-21 RX ORDER — DIPHENHYDRAMINE HCL 25 MG
50 CAPSULE ORAL ONCE
Status: CANCELLED | OUTPATIENT
Start: 2021-07-16

## 2021-06-21 RX ORDER — EPINEPHRINE 1 MG/ML
0.3 INJECTION, SOLUTION, CONCENTRATE INTRAVENOUS EVERY 5 MIN PRN
Status: CANCELLED | OUTPATIENT
Start: 2021-07-09

## 2021-06-21 RX ORDER — SODIUM CHLORIDE 9 MG/ML
1000 INJECTION, SOLUTION INTRAVENOUS CONTINUOUS PRN
Status: CANCELLED
Start: 2021-07-09

## 2021-06-21 RX ORDER — HEPARIN SODIUM (PORCINE) LOCK FLUSH IV SOLN 100 UNIT/ML 100 UNIT/ML
5 SOLUTION INTRAVENOUS
Status: CANCELLED | OUTPATIENT
Start: 2021-07-16

## 2021-06-21 RX ORDER — LORAZEPAM 2 MG/ML
0.5 INJECTION INTRAMUSCULAR EVERY 4 HOURS PRN
Status: CANCELLED
Start: 2021-07-16

## 2021-06-21 RX ORDER — SODIUM CHLORIDE 9 MG/ML
1000 INJECTION, SOLUTION INTRAVENOUS CONTINUOUS PRN
Status: CANCELLED
Start: 2021-07-16

## 2021-06-21 RX ORDER — DIPHENHYDRAMINE HCL 25 MG
50 CAPSULE ORAL ONCE
Status: CANCELLED | OUTPATIENT
Start: 2021-07-09

## 2021-06-21 RX ORDER — LORAZEPAM 2 MG/ML
0.5 INJECTION INTRAMUSCULAR EVERY 4 HOURS PRN
Status: CANCELLED
Start: 2021-07-23

## 2021-06-21 RX ORDER — MEPERIDINE HYDROCHLORIDE 25 MG/ML
25 INJECTION INTRAMUSCULAR; INTRAVENOUS; SUBCUTANEOUS EVERY 30 MIN PRN
Status: CANCELLED | OUTPATIENT
Start: 2021-07-09

## 2021-06-22 ENCOUNTER — NURSE TRIAGE (OUTPATIENT)
Dept: ONCOLOGY | Facility: CLINIC | Age: 51
End: 2021-06-22

## 2021-06-22 NOTE — TELEPHONE ENCOUNTER
[facility] Triage    Logan calls in to report that he has a cough and his temperature has gone up to 99.5 post infusion on Friday.    He states this has happened 3 times post infusion, but the cough and temperature usually resolves in 2-3 days. He also reports chills post infusion that resolves. Currently the cough and temperature has lasted 4 days. He no longer has chills. He is wondering if he has an infection.    He denies SOB, chest pain, chills.     He was advised to continue to monitor and call us if the temperature goes above 100.4, if symptoms worsen, or if he has any concerns.

## 2021-06-23 ENCOUNTER — PATIENT OUTREACH (OUTPATIENT)
Dept: ONCOLOGY | Facility: CLINIC | Age: 51
End: 2021-06-23

## 2021-06-23 NOTE — LETTER
June 23, 2021      RE: June Ronquillo  [address] [address]         To whom it may concern,     June was diagnosed with Multiple Myeloma in May 2021.  He started treatment shortly thereafter.  Due to this diagnosis and treatment Logan's immune system will be suppressed and will put him at greater risk of infection of Covid-19.  Thank you for your consideration in allowing him to continue to [employer].       Sincerely,      Barbi Vazquez MD

## 2021-06-23 NOTE — PROGRESS NOTES
Oncology RN Care Coordination Note:     This writer prepared a letter for this patient for work.  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    Sho Mckeon, RN BSN   [facility]  Nurse Coordinator

## 2021-06-23 NOTE — LETTER
June 23, 2021      RE: June Ronquillo  [address] [address]         To whom it may concern,     June was diagnosed with Multiple Myeloma in May 2021.  He started treatment shortly thereafter.  Due to this diagnosis and treatment Logan's immune system will be suppressed and will put him at greater risk of infection of Covid-19.  Thank you for your consideration in allowing him to [facility]       Sincerely,      Barbi Vazquez MD

## 2021-06-25 ENCOUNTER — INFUSION THERAPY VISIT (OUTPATIENT)
Dept: ONCOLOGY | Facility: CLINIC | Age: 51
End: 2021-06-25
Attending: STUDENT IN AN ORGANIZED HEALTH CARE EDUCATION/TRAINING PROGRAM
Payer: COMMERCIAL

## 2021-06-25 ENCOUNTER — ANCILLARY PROCEDURE (OUTPATIENT)
Dept: GENERAL RADIOLOGY | Facility: CLINIC | Age: 51
End: 2021-06-25
Attending: NURSE PRACTITIONER
Payer: COMMERCIAL

## 2021-06-25 ENCOUNTER — ONCOLOGY VISIT (OUTPATIENT)
Dept: ONCOLOGY | Facility: CLINIC | Age: 51
End: 2021-06-25
Attending: NURSE PRACTITIONER
Payer: COMMERCIAL

## 2021-06-25 VITALS
RESPIRATION RATE: 18 BRPM | OXYGEN SATURATION: 98 % | HEART RATE: 101 BPM | SYSTOLIC BLOOD PRESSURE: 128 MMHG | TEMPERATURE: 98.3 F | BODY MASS INDEX: 27.34 KG/M2 | DIASTOLIC BLOOD PRESSURE: 80 MMHG | WEIGHT: 164.3 LBS

## 2021-06-25 DIAGNOSIS — R05.9 COUGH: ICD-10-CM

## 2021-06-25 DIAGNOSIS — C90.00 MULTIPLE MYELOMA NOT HAVING ACHIEVED REMISSION (H): Primary | ICD-10-CM

## 2021-06-25 DIAGNOSIS — E87.6 HYPOKALEMIA: ICD-10-CM

## 2021-06-25 DIAGNOSIS — C90.00 MULTIPLE MYELOMA NOT HAVING ACHIEVED REMISSION (H): ICD-10-CM

## 2021-06-25 LAB
ALBUMIN SERPL-MCNC: 3.1 G/DL (ref 3.4–5)
ALP SERPL-CCNC: 103 U/L (ref 40–150)
ALT SERPL W P-5'-P-CCNC: 36 U/L (ref 0–70)
ANION GAP SERPL CALCULATED.3IONS-SCNC: 10 MMOL/L (ref 3–14)
AST SERPL W P-5'-P-CCNC: 25 U/L (ref 0–45)
BASOPHILS # BLD AUTO: 0 10E9/L (ref 0–0.2)
BASOPHILS NFR BLD AUTO: 0.2 %
BILIRUB SERPL-MCNC: 0.4 MG/DL (ref 0.2–1.3)
BUN SERPL-MCNC: 10 MG/DL (ref 7–30)
CALCIUM SERPL-MCNC: 8.2 MG/DL (ref 8.5–10.1)
CHLORIDE SERPL-SCNC: 101 MMOL/L (ref 94–109)
CO2 SERPL-SCNC: 24 MMOL/L (ref 20–32)
CREAT SERPL-MCNC: 0.96 MG/DL (ref 0.66–1.25)
DIFFERENTIAL METHOD BLD: ABNORMAL
EOSINOPHIL # BLD AUTO: 0.2 10E9/L (ref 0–0.7)
EOSINOPHIL NFR BLD AUTO: 1.8 %
ERYTHROCYTE [DISTWIDTH] IN BLOOD BY AUTOMATED COUNT: 15.8 % (ref 10–15)
GFR SERPL CREATININE-BSD FRML MDRD: >90 ML/MIN/{1.73_M2}
GLUCOSE SERPL-MCNC: 123 MG/DL (ref 70–99)
HCT VFR BLD AUTO: 37.8 % (ref 40–53)
HGB BLD-MCNC: 12.9 G/DL (ref 13.3–17.7)
IMM GRANULOCYTES # BLD: 0.1 10E9/L (ref 0–0.4)
IMM GRANULOCYTES NFR BLD: 0.5 %
LYMPHOCYTES # BLD AUTO: 1 10E9/L (ref 0.8–5.3)
LYMPHOCYTES NFR BLD AUTO: 9.8 %
MCH RBC QN AUTO: 30.4 PG (ref 26.5–33)
MCHC RBC AUTO-ENTMCNC: 34.1 G/DL (ref 31.5–36.5)
MCV RBC AUTO: 89 FL (ref 78–100)
MONOCYTES # BLD AUTO: 1.1 10E9/L (ref 0–1.3)
MONOCYTES NFR BLD AUTO: 10.4 %
NEUTROPHILS # BLD AUTO: 8.3 10E9/L (ref 1.6–8.3)
NEUTROPHILS NFR BLD AUTO: 77.3 %
NRBC # BLD AUTO: 0 10*3/UL
NRBC BLD AUTO-RTO: 0 /100
PLATELET # BLD AUTO: 360 10E9/L (ref 150–450)
POTASSIUM SERPL-SCNC: 3.2 MMOL/L (ref 3.4–5.3)
PROT SERPL-MCNC: 6.5 G/DL (ref 6.8–8.8)
RBC # BLD AUTO: 4.25 10E12/L (ref 4.4–5.9)
SARS-COV-2 RNA RESP QL NAA+PROBE: NORMAL
SODIUM SERPL-SCNC: 135 MMOL/L (ref 133–144)
SPECIMEN SOURCE: NORMAL
WBC # BLD AUTO: 10.7 10E9/L (ref 4–11)

## 2021-06-25 PROCEDURE — 36415 COLL VENOUS BLD VENIPUNCTURE: CPT

## 2021-06-25 PROCEDURE — 250N000013 HC RX MED GY IP 250 OP 250 PS 637: Performed by: STUDENT IN AN ORGANIZED HEALTH CARE EDUCATION/TRAINING PROGRAM

## 2021-06-25 PROCEDURE — 250N000011 HC RX IP 250 OP 636: Performed by: STUDENT IN AN ORGANIZED HEALTH CARE EDUCATION/TRAINING PROGRAM

## 2021-06-25 PROCEDURE — 250N000013 HC RX MED GY IP 250 OP 250 PS 637: Performed by: NURSE PRACTITIONER

## 2021-06-25 PROCEDURE — 71046 X-RAY EXAM CHEST 2 VIEWS: CPT | Mod: GC | Performed by: RADIOLOGY

## 2021-06-25 PROCEDURE — U0005 INFEC AGEN DETEC AMPLI PROBE: HCPCS | Performed by: NURSE PRACTITIONER

## 2021-06-25 PROCEDURE — 96401 CHEMO ANTI-NEOPL SQ/IM: CPT

## 2021-06-25 PROCEDURE — 250N000012 HC RX MED GY IP 250 OP 636 PS 637: Performed by: STUDENT IN AN ORGANIZED HEALTH CARE EDUCATION/TRAINING PROGRAM

## 2021-06-25 PROCEDURE — 99214 OFFICE O/P EST MOD 30 MIN: CPT | Performed by: NURSE PRACTITIONER

## 2021-06-25 PROCEDURE — 80053 COMPREHEN METABOLIC PANEL: CPT | Performed by: STUDENT IN AN ORGANIZED HEALTH CARE EDUCATION/TRAINING PROGRAM

## 2021-06-25 PROCEDURE — U0003 INFECTIOUS AGENT DETECTION BY NUCLEIC ACID (DNA OR RNA); SEVERE ACUTE RESPIRATORY SYNDROME CORONAVIRUS 2 (SARS-COV-2) (CORONAVIRUS DISEASE [COVID-19]), AMPLIFIED PROBE TECHNIQUE, MAKING USE OF HIGH THROUGHPUT TECHNOLOGIES AS DESCRIBED BY CMS-2020-01-R: HCPCS | Performed by: NURSE PRACTITIONER

## 2021-06-25 PROCEDURE — 85025 COMPLETE CBC W/AUTO DIFF WBC: CPT | Performed by: STUDENT IN AN ORGANIZED HEALTH CARE EDUCATION/TRAINING PROGRAM

## 2021-06-25 RX ORDER — POTASSIUM CHLORIDE 1500 MG/1
40 TABLET, EXTENDED RELEASE ORAL ONCE
Status: COMPLETED | OUTPATIENT
Start: 2021-06-25 | End: 2021-06-25

## 2021-06-25 RX ORDER — DIPHENHYDRAMINE HCL 25 MG
50 CAPSULE ORAL ONCE
Status: COMPLETED | OUTPATIENT
Start: 2021-06-25 | End: 2021-06-25

## 2021-06-25 RX ORDER — ACETAMINOPHEN 325 MG/1
650 TABLET ORAL ONCE
Status: COMPLETED | OUTPATIENT
Start: 2021-06-25 | End: 2021-06-25

## 2021-06-25 RX ORDER — DEXAMETHASONE 4 MG/1
20 TABLET ORAL ONCE
Status: COMPLETED | OUTPATIENT
Start: 2021-06-25 | End: 2021-06-25

## 2021-06-25 RX ADMIN — POTASSIUM CHLORIDE 40 MEQ: 1500 TABLET, EXTENDED RELEASE ORAL at 16:47

## 2021-06-25 RX ADMIN — ACETAMINOPHEN 650 MG: 325 TABLET ORAL at 16:46

## 2021-06-25 RX ADMIN — DEXAMETHASONE 20 MG: 4 TABLET ORAL at 16:46

## 2021-06-25 RX ADMIN — DARATUMUMAB AND HYALURONIDASE-FIHJ (HUMAN RECOMBINANT) 1800 MG: 1800; 30000 INJECTION SUBCUTANEOUS at 17:39

## 2021-06-25 RX ADMIN — BORTEZOMIB 2.5 MG: 3.5 INJECTION, POWDER, LYOPHILIZED, FOR SOLUTION INTRAVENOUS; SUBCUTANEOUS at 17:39

## 2021-06-25 RX ADMIN — DIPHENHYDRAMINE HYDROCHLORIDE 50 MG: 25 CAPSULE ORAL at 16:47

## 2021-06-25 ASSESSMENT — PAIN SCALES - GENERAL: PAINLEVEL: MILD PAIN (3)

## 2021-06-25 NOTE — PROGRESS NOTES
Infusion Nursing Note:  June Ronquillo presents today for Cycle 2 Day 8 Velcade, Darzalex.    Patient seen by provider today: No   present during visit today: Not Applicable.    Note: Reports cough since Friday.  Has not improved.  He wonders if he has fluid in his lungs or an infection.  Called triage Tuesday with chills and temp of 99.5.  Logan states he has had no temps higher than 99.5. He feels more fatigued.  He is coughing here in infusion.  He denies SOB or chest pain. Endorses body aches.  Afebrile here.  Oxygen sats WNL.    NAVIN Chappell Rn / Alba Moses DNP @ 7243  Replace K+ of 3.2 orally (40meq given)  Add to her schedule  Swab for Covid. (sent)    Per Alba CHAPMAN to treat.  Send for CXR on the way out. She will follow up.      Treatment Conditions:  Lab Results   Component Value Date    HGB 12.9 06/25/2021     Lab Results   Component Value Date    WBC 10.7 06/25/2021      Lab Results   Component Value Date    ANEU 8.3 06/25/2021     Lab Results   Component Value Date     06/25/2021      Lab Results   Component Value Date     06/25/2021                   Lab Results   Component Value Date    POTASSIUM 3.2 06/25/2021           No results found for: MAG         Lab Results   Component Value Date    CR 0.96 06/25/2021                   Lab Results   Component Value Date    HARDEEP 8.2 06/25/2021                Lab Results   Component Value Date    BILITOTAL 0.4 06/25/2021           Lab Results   Component Value Date    ALBUMIN 3.1 06/25/2021                    Lab Results   Component Value Date    ALT 36 06/25/2021           Lab Results   Component Value Date    AST 25 06/25/2021       Results reviewed, labs MET treatment parameters, ok to proceed with treatment.      Post Infusion Assessment:  Patient tolerated subcutaneous Velcade injection without incident to Right Arm  Tolerated subcutaneous Darzalex Faspro to without incident to Right Abdomen.       Discharge Plan:    Patient declined prescription refills.  AVS to patient via [employer].  Patient will return 6/30 Dr. Vazquez, 7/1 labs/chemo for next appointment.   Patient discharged in stable condition accompanied by: self.  Departure Mode: Ambulatory.  Face to Face time: 0.      Linda Chappell RN                  99.5 with chills and cough, called triage 6/22.

## 2021-06-25 NOTE — LETTER
6/25/2021         RE: June Ronquillo  [address]  [facility] [id number]        Dear Colleague,    Thank you for referring your patient, June Ronquillo, to the [facility]. Please see a copy of my visit note below.    Reason for Visit: seen on an add-on basis for evaluation of cough and hx of low grade fever    Oncology HPI:   - 4/30/2021 M spike of 34.8 in urine.M spike in blood 0.2; IgG 702 with depressed IgA and IgM. Beta 2 microglobulin 2.7. Lambda  with K/L ratio of 0.01. WBC 11.1 with absolute eos of 1.0. hgb, plt, Cr (1.1), and albumin WNL.     -4/30/2021 CT abd/pelvis showed sclerotic marrow changes with numerous lytic appearing lesions throughout the imaged portions of the spine, pelvis and ribs.      -PET scan 5/11/2021 Numerous osteolytic lesions which predominantly demonstrate uptake below liver background levels. There is one intraosseous lesion in the left lateral 9th rib that demonstrates uptake above background, possibly due to nondisplaced fracture. Adjacent to this lesion is mild  hypermetabolism to the left pleura, with new small left pleural effusion, suspicious for malignant effusion versus posttraumatic effusion. Pleural uptake may represent extramedullary myeloma extending along the intercostal space versus inflammation.     - BM bx 5/17/2021 with flow showing 4.0% plasma cells which express CD19 (dim), CD38 (bright), CD45 (dim), , and monotypic cytoplasmic lambda immunoglobulin light chains but lack CD20 and CD56.  Hypercellular bone marrow for age (approximately 75% cellularity) with   adequate trilineage hematopoiesis. Plasma cell infiltrate: Interstitial, lambda monotypic and representing approximately 60% the bone marrow cellularity, by  immunohistochemical stain.      -initiated vortezomib/daratumumab on 5/27/21    Interval history: Logan is here for C2D8 treatment. He mentioned to the nurse that he has a dry cough that is  progressing. No sob, cp, palpitation. When he coughs, he has some referred left shoulder pain. No sinus congestion, sore throat, change to taste/smell. Had low grade fevers after his last treatment on 6/18/21. Temp was in the 99.5. Notes he has had this with each infusion. No chills now, but did after the infusion. No recurrence of symptoms. Does not feel particularly ill today. Has progressive fatigue as his biggest complaint. No positional changes that worsen the cough. No reflux/GERD symptoms. No wheezing or allergy.    Current Outpatient Medications   Medication Sig Dispense Refill     acyclovir (ZOVIRAX) 400 MG tablet Take 1 tablet (400 mg) by mouth 2 times daily 60 tablet 6     acyclovir (ZOVIRAX) 400 MG tablet Take 1 tablet (400 mg) by mouth 2 times daily Viral Prophylaxis. 60 tablet 11     aspirin (ASA) 325 MG tablet Take 1 tablet (325 mg) by mouth daily 30 tablet 4     ASPIRIN 81 MG OR TABS 1 TABLET DAILY       dexamethasone (DECADRON) 4 MG tablet Take 20 mg (five tabs) by mouth on Days 2, 9, and 16. 15 tablet 0     dexamethasone (DECADRON) 4 MG tablet Take 20 mg (five tabs) by mouth on Days 2, 9, and 16. 15 tablet 0     FISH OIL 1000 MG OR CAPS 1 bid       hydrochlorothiazide (HYDRODIURIL) 50 MG tablet Take 50 mg by mouth daily       LENalidomide (REVLIMID) 25 MG CAPS capsule Take 1 capsule (25 mg) by mouth daily for 14 days 14 capsule 0     levofloxacin (LEVAQUIN) 250 MG tablet Take 1 tablet (250 mg) by mouth daily 30 tablet 4     levothyroxine (SYNTHROID/LEVOTHROID) 50 MCG tablet Take 1 tablet (50 mcg) by mouth daily 90 tablet 0     losartan (COZAAR) 50 MG tablet Take 1 tablet (50 mg) by mouth daily 30 tablet 3     order for DME Rib belt 1 Device 0     potassium chloride ER (KLOR-CON M) 10 MEQ CR tablet Take 2 tablets (20 mEq) by mouth daily 30 tablet 3     prochlorperazine (COMPAZINE) 10 MG tablet Take 1 tablet (10 mg) by mouth every 6 hours as needed (Nausea/Vomiting) 30 tablet 5     rosuvastatin  (CRESTOR) 40 MG tablet TAKE ONE TABLET BY MOUTH ONCE DAILY. MUST MAKE APPOINTMENT FOR ANNUAL FOLLOW UP FOR FURTHER REFILLS 90 tablet 1     vitamin D3 (CHOLECALCIFEROL) 250 mcg (22026 units) capsule Take 1 capsule (250 mcg) by mouth daily 30 capsule 11     vitamin D3 (CHOLECALCIFEROL) 250 mcg (46246 units) capsule Take 1 capsule by mouth daily            Allergies   Allergen Reactions     Nkda [No Known Drug Allergies]          Exam: alert, appears fatigued, but in NAD.     Vital Signs 6/25/2021   Systolic 128   Diastolic 80   Pulse 101   Temperature 98.3   Respirations 18   Weight (LB) 164 lb 4.8 oz   Height    Pain    O2 98     Wt Readings from Last 4 Encounters:   06/25/21 74.5 kg (164 lb 4.8 oz)   06/18/21 75.8 kg (167 lb)   06/11/21 77.3 kg (170 lb 6.4 oz)   06/04/21 77.7 kg (171 lb 3.2 oz)    Lungs: crackles in the left base. Deep inspiration and cough improve sounds. Demonstrates a dry cough.  Heart: RRR, no murmur or rub. Extremities: warm, no edema. Speech is clear. CN wnl. Gait/station wnl.      Labs:   Results for DARIUSZ CEDILLO (MRN [id number]) as of 6/25/2021 17:06   Ref. Range 6/25/2021 14:52   Sodium Latest Ref Range: 133 - 144 mmol/L 135   Potassium Latest Ref Range: 3.4 - 5.3 mmol/L 3.2 (L)   Chloride Latest Ref Range: 94 - 109 mmol/L 101   Carbon Dioxide Latest Ref Range: 20 - 32 mmol/L 24   Urea Nitrogen Latest Ref Range: 7 - 30 mg/dL 10   Creatinine Latest Ref Range: 0.66 - 1.25 mg/dL 0.96   GFR Estimate Latest Ref Range: >60 mL/min/1.73_m2 >90   GFR Estimate If Black Latest Ref Range: >60 mL/min/1.73_m2 >90   Calcium Latest Ref Range: 8.5 - 10.1 mg/dL 8.2 (L)   Anion Gap Latest Ref Range: 3 - 14 mmol/L 10   Albumin Latest Ref Range: 3.4 - 5.0 g/dL 3.1 (L)   Protein Total Latest Ref Range: 6.8 - 8.8 g/dL 6.5 (L)   Bilirubin Total Latest Ref Range: 0.2 - 1.3 mg/dL 0.4   Alkaline Phosphatase Latest Ref Range: 40 - 150 U/L 103   ALT Latest Ref Range: 0 - 70 U/L 36   AST Latest Ref Range: 0 - 45 U/L 25    Glucose Latest Ref Range: 70 - 99 mg/dL 123 (H)   WBC Latest Ref Range: 4.0 - 11.0 10e9/L 10.7   Hemoglobin Latest Ref Range: 13.3 - 17.7 g/dL 12.9 (L)   Hematocrit Latest Ref Range: 40.0 - 53.0 % 37.8 (L)   Platelet Count Latest Ref Range: 150 - 450 10e9/L 360   RBC Count Latest Ref Range: 4.4 - 5.9 10e12/L 4.25 (L)   MCV Latest Ref Range: 78 - 100 fl 89   MCH Latest Ref Range: 26.5 - 33.0 pg 30.4   MCHC Latest Ref Range: 31.5 - 36.5 g/dL 34.1   RDW Latest Ref Range: 10.0 - 15.0 % 15.8 (H)   Diff Method Unknown Automated Method   % Neutrophils Latest Units: % 77.3   % Lymphocytes Latest Units: % 9.8   % Monocytes Latest Units: % 10.4   % Eosinophils Latest Units: % 1.8   % Basophils Latest Units: % 0.2   % Immature Granulocytes Latest Units: % 0.5   Nucleated RBCs Latest Ref Range: 0 /100 0   Absolute Neutrophil Latest Ref Range: 1.6 - 8.3 10e9/L 8.3   Absolute Lymphocytes Latest Ref Range: 0.8 - 5.3 10e9/L 1.0   Absolute Monocytes Latest Ref Range: 0.0 - 1.3 10e9/L 1.1   Absolute Eosinophils Latest Ref Range: 0.0 - 0.7 10e9/L 0.2   Absolute Basophils Latest Ref Range: 0.0 - 0.2 10e9/L 0.0   Abs Immature Granulocytes Latest Ref Range: 0 - 0.4 10e9/L 0.1   Absolute Nucleated RBC Unknown 0.0       Imaging: pending    Impression/plan:   Dry cough of unclear etiology in the setting of multiple myeloma. Has a presenting symptom of L pleural effusion. Has low grade fevers post infusion, which is likely related to the daratumumab. No overt symptoms of infection today. Is afebrile, VSS including O2 sats. Reports having been vaccinated for COVID elsewhere.   -no symptoms of GERD. No wheezing or sinus drainage.  -has a hx of CAD, s/p stenting, but no hx of heart failure  -daratumumab is associated with cough (about 21% per up to date), but given other history would like to evaluate for other causes.  -will swab for covid, check chest xray.    Ok to proceed with treatment today as he is otherwise stable and no clearcut  reason to hold treatment. Call if having worsening cough or development of dyspnea, worsening fevers    Will continue abx prophy with levaquin 250 mg po daily,acv 400 mg daily      Again, thank you for allowing me to participate in the care of your patient.        Sincerely,        PATTY Rooney CNP

## 2021-06-25 NOTE — PROGRESS NOTES
Reason for Visit: seen on an add-on basis for evaluation of cough and hx of low grade fever    Oncology HPI:   - 4/30/2021 M spike of 34.8 in urine.M spike in blood 0.2; IgG 702 with depressed IgA and IgM. Beta 2 microglobulin 2.7. Lambda  with K/L ratio of 0.01. WBC 11.1 with absolute eos of 1.0. hgb, plt, Cr (1.1), and albumin WNL.     -4/30/2021 CT abd/pelvis showed sclerotic marrow changes with numerous lytic appearing lesions throughout the imaged portions of the spine, pelvis and ribs.      -PET scan 5/11/2021 Numerous osteolytic lesions which predominantly demonstrate uptake below liver background levels. There is one intraosseous lesion in the left lateral 9th rib that demonstrates uptake above background, possibly due to nondisplaced fracture. Adjacent to this lesion is mild  hypermetabolism to the left pleura, with new small left pleural effusion, suspicious for malignant effusion versus posttraumatic effusion. Pleural uptake may represent extramedullary myeloma extending along the intercostal space versus inflammation.     - BM bx 5/17/2021 with flow showing 4.0% plasma cells which express CD19 (dim), CD38 (bright), CD45 (dim), , and monotypic cytoplasmic lambda immunoglobulin light chains but lack CD20 and CD56.  Hypercellular bone marrow for age (approximately 75% cellularity) with   adequate trilineage hematopoiesis. Plasma cell infiltrate: Interstitial, lambda monotypic and representing approximately 60% the bone marrow cellularity, by  immunohistochemical stain.      -initiated vortezomib/daratumumab on 5/27/21    Interval history: Logan is here for C2D8 treatment. He mentioned to the nurse that he has a dry cough that is progressing. No sob, cp, palpitation. When he coughs, he has some referred left shoulder pain. No sinus congestion, sore throat, change to taste/smell. Had low grade fevers after his last treatment on 6/18/21. Temp was in the 99.5. Notes he has had this with each  infusion. No chills now, but did after the infusion. No recurrence of symptoms. Does not feel particularly ill today. Has progressive fatigue as his biggest complaint. No positional changes that worsen the cough. No reflux/GERD symptoms. No wheezing or allergy.    Current Outpatient Medications   Medication Sig Dispense Refill     acyclovir (ZOVIRAX) 400 MG tablet Take 1 tablet (400 mg) by mouth 2 times daily 60 tablet 6     acyclovir (ZOVIRAX) 400 MG tablet Take 1 tablet (400 mg) by mouth 2 times daily Viral Prophylaxis. 60 tablet 11     aspirin (ASA) 325 MG tablet Take 1 tablet (325 mg) by mouth daily 30 tablet 4     ASPIRIN 81 MG OR TABS 1 TABLET DAILY       dexamethasone (DECADRON) 4 MG tablet Take 20 mg (five tabs) by mouth on Days 2, 9, and 16. 15 tablet 0     dexamethasone (DECADRON) 4 MG tablet Take 20 mg (five tabs) by mouth on Days 2, 9, and 16. 15 tablet 0     FISH OIL 1000 MG OR CAPS 1 bid       hydrochlorothiazide (HYDRODIURIL) 50 MG tablet Take 50 mg by mouth daily       LENalidomide (REVLIMID) 25 MG CAPS capsule Take 1 capsule (25 mg) by mouth daily for 14 days 14 capsule 0     levofloxacin (LEVAQUIN) 250 MG tablet Take 1 tablet (250 mg) by mouth daily 30 tablet 4     levothyroxine (SYNTHROID/LEVOTHROID) 50 MCG tablet Take 1 tablet (50 mcg) by mouth daily 90 tablet 0     losartan (COZAAR) 50 MG tablet Take 1 tablet (50 mg) by mouth daily 30 tablet 3     order for DME Rib belt 1 Device 0     potassium chloride ER (KLOR-CON M) 10 MEQ CR tablet Take 2 tablets (20 mEq) by mouth daily 30 tablet 3     prochlorperazine (COMPAZINE) 10 MG tablet Take 1 tablet (10 mg) by mouth every 6 hours as needed (Nausea/Vomiting) 30 tablet 5     rosuvastatin (CRESTOR) 40 MG tablet TAKE ONE TABLET BY MOUTH ONCE DAILY. MUST MAKE APPOINTMENT FOR ANNUAL FOLLOW UP FOR FURTHER REFILLS 90 tablet 1     vitamin D3 (CHOLECALCIFEROL) 250 mcg (24314 units) capsule Take 1 capsule (250 mcg) by mouth daily 30 capsule 11     vitamin D3  (CHOLECALCIFEROL) 250 mcg (19440 units) capsule Take 1 capsule by mouth daily            Allergies   Allergen Reactions     Nkda [No Known Drug Allergies]          Exam: alert, appears fatigued, but in NAD.     Vital Signs 6/25/2021   Systolic 128   Diastolic 80   Pulse 101   Temperature 98.3   Respirations 18   Weight (LB) 164 lb 4.8 oz   Height    Pain    O2 98     Wt Readings from Last 4 Encounters:   06/25/21 74.5 kg (164 lb 4.8 oz)   06/18/21 75.8 kg (167 lb)   06/11/21 77.3 kg (170 lb 6.4 oz)   06/04/21 77.7 kg (171 lb 3.2 oz)    Lungs: crackles in the left base. Deep inspiration and cough improve sounds. Demonstrates a dry cough.  Heart: RRR, no murmur or rub. Extremities: warm, no edema. Speech is clear. CN wnl. Gait/station wnl.      Labs:   Results for DARIUSZ CEDILLO (MRN [id number]) as of 6/25/2021 17:06   Ref. Range 6/25/2021 14:52   Sodium Latest Ref Range: 133 - 144 mmol/L 135   Potassium Latest Ref Range: 3.4 - 5.3 mmol/L 3.2 (L)   Chloride Latest Ref Range: 94 - 109 mmol/L 101   Carbon Dioxide Latest Ref Range: 20 - 32 mmol/L 24   Urea Nitrogen Latest Ref Range: 7 - 30 mg/dL 10   Creatinine Latest Ref Range: 0.66 - 1.25 mg/dL 0.96   GFR Estimate Latest Ref Range: >60 mL/min/1.73_m2 >90   GFR Estimate If Black Latest Ref Range: >60 mL/min/1.73_m2 >90   Calcium Latest Ref Range: 8.5 - 10.1 mg/dL 8.2 (L)   Anion Gap Latest Ref Range: 3 - 14 mmol/L 10   Albumin Latest Ref Range: 3.4 - 5.0 g/dL 3.1 (L)   Protein Total Latest Ref Range: 6.8 - 8.8 g/dL 6.5 (L)   Bilirubin Total Latest Ref Range: 0.2 - 1.3 mg/dL 0.4   Alkaline Phosphatase Latest Ref Range: 40 - 150 U/L 103   ALT Latest Ref Range: 0 - 70 U/L 36   AST Latest Ref Range: 0 - 45 U/L 25   Glucose Latest Ref Range: 70 - 99 mg/dL 123 (H)   WBC Latest Ref Range: 4.0 - 11.0 10e9/L 10.7   Hemoglobin Latest Ref Range: 13.3 - 17.7 g/dL 12.9 (L)   Hematocrit Latest Ref Range: 40.0 - 53.0 % 37.8 (L)   Platelet Count Latest Ref Range: 150 - 450 10e9/L 360    RBC Count Latest Ref Range: 4.4 - 5.9 10e12/L 4.25 (L)   MCV Latest Ref Range: 78 - 100 fl 89   MCH Latest Ref Range: 26.5 - 33.0 pg 30.4   MCHC Latest Ref Range: 31.5 - 36.5 g/dL 34.1   RDW Latest Ref Range: 10.0 - 15.0 % 15.8 (H)   Diff Method Unknown Automated Method   % Neutrophils Latest Units: % 77.3   % Lymphocytes Latest Units: % 9.8   % Monocytes Latest Units: % 10.4   % Eosinophils Latest Units: % 1.8   % Basophils Latest Units: % 0.2   % Immature Granulocytes Latest Units: % 0.5   Nucleated RBCs Latest Ref Range: 0 /100 0   Absolute Neutrophil Latest Ref Range: 1.6 - 8.3 10e9/L 8.3   Absolute Lymphocytes Latest Ref Range: 0.8 - 5.3 10e9/L 1.0   Absolute Monocytes Latest Ref Range: 0.0 - 1.3 10e9/L 1.1   Absolute Eosinophils Latest Ref Range: 0.0 - 0.7 10e9/L 0.2   Absolute Basophils Latest Ref Range: 0.0 - 0.2 10e9/L 0.0   Abs Immature Granulocytes Latest Ref Range: 0 - 0.4 10e9/L 0.1   Absolute Nucleated RBC Unknown 0.0       Imaging: pending    Impression/plan:   Dry cough of unclear etiology in the setting of multiple myeloma. Has a presenting symptom of L pleural effusion. Has low grade fevers post infusion, which is likely related to the daratumumab. No overt symptoms of infection today. Is afebrile, VSS including O2 sats. Reports having been vaccinated for COVID elsewhere.   -no symptoms of GERD. No wheezing or sinus drainage.  -has a hx of CAD, s/p stenting, but no hx of heart failure  -daratumumab is associated with cough (about 21% per up to date), but given other history would like to evaluate for other causes.  -will swab for covid, check chest xray.    Ok to proceed with treatment today as he is otherwise stable and no clearcut reason to hold treatment. Call if having worsening cough or development of dyspnea, worsening fevers    Will continue abx prophy with levaquin 250 mg po daily,acv 400 mg daily

## 2021-06-26 LAB
LABORATORY COMMENT REPORT: NORMAL
SARS-COV-2 RNA RESP QL NAA+PROBE: NEGATIVE
SPECIMEN SOURCE: NORMAL

## 2021-06-28 ENCOUNTER — TELEPHONE (OUTPATIENT)
Dept: ONCOLOGY | Facility: CLINIC | Age: 51
End: 2021-06-28

## 2021-06-28 NOTE — TELEPHONE ENCOUNTER
Oral Chemotherapy Monitoring Program   Medication: Revlimid  Rx: 25mg PO daily on days 1 through 14 of 21 day cycle   Auth #: [id number]   Risk Category: Adult male  Routine survey questions reviewed.   [facility]  Oncology Pharmacy Liaison   [email or web address]  [phone] (phone  [phone] (fax

## 2021-06-29 DIAGNOSIS — C90.00 MULTIPLE MYELOMA NOT HAVING ACHIEVED REMISSION (H): Primary | ICD-10-CM

## 2021-06-29 RX ORDER — LENALIDOMIDE 25 MG/1
25 CAPSULE ORAL DAILY
Qty: 14 CAPSULE | Refills: 0 | Status: SHIPPED | OUTPATIENT
Start: 2021-06-29 | End: 2021-07-19

## 2021-06-30 ENCOUNTER — VIRTUAL VISIT (OUTPATIENT)
Dept: ONCOLOGY | Facility: CLINIC | Age: 51
End: 2021-06-30
Attending: STUDENT IN AN ORGANIZED HEALTH CARE EDUCATION/TRAINING PROGRAM
Payer: COMMERCIAL

## 2021-06-30 DIAGNOSIS — C90.00 MULTIPLE MYELOMA NOT HAVING ACHIEVED REMISSION (H): Primary | ICD-10-CM

## 2021-06-30 DIAGNOSIS — K30 STOMACH UPSET: ICD-10-CM

## 2021-06-30 PROCEDURE — 999N001193 HC VIDEO/TELEPHONE VISIT; NO CHARGE

## 2021-06-30 PROCEDURE — 99214 OFFICE O/P EST MOD 30 MIN: CPT | Mod: 95 | Performed by: STUDENT IN AN ORGANIZED HEALTH CARE EDUCATION/TRAINING PROGRAM

## 2021-06-30 RX ORDER — PANTOPRAZOLE SODIUM 20 MG/1
20 TABLET, DELAYED RELEASE ORAL DAILY
Qty: 30 TABLET | Refills: 4 | Status: ON HOLD | OUTPATIENT
Start: 2021-06-30 | End: 2021-11-12

## 2021-06-30 NOTE — PROGRESS NOTES
"Logan is a 51 year old who is being evaluated via a billable video visit.      How would you like to obtain your AVS? MyChart  If the video visit is dropped, the invitation should be resent by: Send to e-mail at: [email or web address]  Will anyone else be joining your video visit? No    Vitals - Patient Reported  Weight (Patient Reported): 74.5 kg (164 lb 3.9 oz)  Height (Patient Reported): 165.1 cm (5' 5\")  BMI (Based on Pt Reported Ht/Wt): 27.33  Pain Score: No Pain (0)     Video Start Time: 12:32 PM  Video-Visit Details    Type of service:  Video Visit    Video End Time:12:59 PM    Originating Location (pt. Location): Home    Distant Location (provider location):  [facility]     Platform used for Video Visit: [employer]    Eleanor Gil MA        Oncologic Hx:   - 4/30/2021 M spike of 34.8 in urine.M spike in blood 0.2; IgG 702 with depressed IgA and IgM. Beta 2 microglobulin 2.7. Lambda  with K/L ratio of 0.01. WBC 11.1 with absolute eos of 1.0. hgb, plt, Cr (1.1), and albumin WNL.    -4/30/2021 CT abd/pelvis showed sclerotic marrow changes with numerous lytic appearing lesions throughout the imaged portions of the spine, pelvis and ribs.      -PET scan 5/11/2021 Numerous osteolytic lesions which predominantly demonstrate uptake below liver background levels. There is one intraosseous lesion in the left lateral 9th rib that demonstrates uptake above background, possibly due to nondisplaced fracture. Adjacent to this lesion is mild  hypermetabolism to the left pleura, with new small left pleural effusion, suspicious for malignant effusion versus posttraumatic effusion. Pleural uptake may represent extramedullary myeloma extending along the intercostal space versus inflammation.    - BM bx 5/17/2021 with flow showing 4.0% plasma cells which express CD19 (dim), CD38 (bright), CD45 (dim), , and monotypic cytoplasmic lambda immunoglobulin light chains but lack CD20 and CD56.  " Hypercellular bone marrow for age (approximately 75% cellularity) with adequate trilineage hematopoiesis. Plasma cell infiltrate: Interstitial, lambda monotypic and representing approximately 60% the bone marrow cellularity, by  immunohistochemical stain. Cytpgenetics with 2 related hypodiploid clones. One with loss of Y, monosomy 13 and 14 (5%) and one with translocation of 8p and 14, derivative 16 with long arm replaced by extra copy 1q,monosomy 21. FISH showed gain of 1q, loss of 13 and 14, IGH-MYC fusion t(8:14)    - Started Miracle-RVd 21 day with velcade on days 1,8,15.     -Cycle 2 Miracle-RVd. Dose reduce dex to 80 mg d/t fatigue and stomach upset on dex days. Also having cough with basilar streaking on CXR    Interval Hx: Logan was seen during cycle 2 for cough and CXR continues to show some streaking and atelectasis. He feels fatigue, aching, and muscle cramps on dexamethasone days. He is interested in decreasing treatment. He has not yet seen the dentist. He says tylenol helps his muscle issues but makes his stomach worse. No N/V/D. Rib pain has resolved    A comprehensive review of systems was completed and negative except as described above.       Physical Exam:   General: NAD  HEENT: no scleral icterus  Resp: nonlabored breathing, occasional dry cough  Skin: no rashes observed  Neuro: alert, conversant, no facial droop  Psych: appropriate mood and affect      The rest of a comprehensive physical examination is deferred due to Public Health Emergency telephone visit restrictions      Assessment and Plan  Logan has multiple myeloma with high risk cytogenetics. Start pantoprazole for his steroid induced gastritis.     Levofloxacin 250 mg every day,  mg every day, and acyclovir 400 mg BID for prophylaxis      Left nondisplaced rib fracture:  Continue Vit D 10,000 international unit(s). Also we discussed zometa and that the goal is to do 2 years of monthly infusions. He will work on getting his dental  work completed so we can start this.    Cough: Recommended cough suppressant but he feels ciough drops are working. He will call with worsening or fever.     Barbi Vazquez MD PhD

## 2021-06-30 NOTE — LETTER
"    6/30/2021         RE: June Ronquillo  [address]  [place] [address]        Dear Colleague,    Thank you for referring your patient, June Ronquillo, to the [facility]. Please see a copy of my visit note below.    Logan is a 51 year old who is being evaluated via a billable video visit.      How would you like to obtain your AVS? MyChart  If the video visit is dropped, the invitation should be resent by: Send to e-mail at: [email or web address]  Will anyone else be joining your video visit? No    Vitals - Patient Reported  Weight (Patient Reported): 74.5 kg (164 lb 3.9 oz)  Height (Patient Reported): 165.1 cm (5' 5\")  BMI (Based on Pt Reported Ht/Wt): 27.33  Pain Score: No Pain (0)     Video Start Time: 12:32 PM  Video-Visit Details    Type of service:  Video Visit    Video End Time:12:59 PM    Originating Location (pt. Location): Home    Distant Location (provider location):  [facility]     Platform used for Video Visit: Adán Gil MA        Oncologic Hx:   - 4/30/2021 M spike of 34.8 in urine.M spike in blood 0.2; IgG 702 with depressed IgA and IgM. Beta 2 microglobulin 2.7. Lambda  with K/L ratio of 0.01. WBC 11.1 with absolute eos of 1.0. hgb, plt, Cr (1.1), and albumin WNL.    -4/30/2021 CT abd/pelvis showed sclerotic marrow changes with numerous lytic appearing lesions throughout the imaged portions of the spine, pelvis and ribs.      -PET scan 5/11/2021 Numerous osteolytic lesions which predominantly demonstrate uptake below liver background levels. There is one intraosseous lesion in the left lateral 9th rib that demonstrates uptake above background, possibly due to nondisplaced fracture. Adjacent to this lesion is mild  hypermetabolism to the left pleura, with new small left pleural effusion, suspicious for malignant effusion versus posttraumatic effusion. Pleural uptake may represent extramedullary myeloma " extending along the intercostal space versus inflammation.    - BM bx 5/17/2021 with flow showing 4.0% plasma cells which express CD19 (dim), CD38 (bright), CD45 (dim), , and monotypic cytoplasmic lambda immunoglobulin light chains but lack CD20 and CD56.  Hypercellular bone marrow for age (approximately 75% cellularity) with adequate trilineage hematopoiesis. Plasma cell infiltrate: Interstitial, lambda monotypic and representing approximately 60% the bone marrow cellularity, by  immunohistochemical stain. Cytpgenetics with 2 related hypodiploid clones. One with loss of Y, monosomy 13 and 14 (5%) and one with translocation of 8p and 14, derivative 16 with long arm replaced by extra copy 1q,monosomy 21. FISH showed gain of 1q, loss of 13 and 14, IGH-MYC fusion t(8:14)    - Started Miracle-RVd 21 day with velcade on days 1,8,15.     -Cycle 2 Miracle-RVd. Dose reduce dex to 80 mg d/t fatigue and stomach upset on dex days. Also having cough with basilar streaking on CXR    Interval Hx: Logan was seen during cycle 2 for cough and CXR continues to show some streaking and atelectasis. He feels fatigue, aching, and muscle cramps on dexamethasone days. He is interested in decreasing treatment. He has not yet seen the dentist. He says tylenol helps his muscle issues but makes his stomach worse. No N/V/D. Rib pain has resolved    A comprehensive review of systems was completed and negative except as described above.       Physical Exam:   General: NAD  HEENT: no scleral icterus  Resp: nonlabored breathing, occasional dry cough  Skin: no rashes observed  Neuro: alert, conversant, no facial droop  Psych: appropriate mood and affect      The rest of a comprehensive physical examination is deferred due to Public Health Emergency telephone visit restrictions      Assessment and Plan  Logan has multiple myeloma with high risk cytogenetics. Start pantoprazole for his steroid induced gastritis.     Levofloxacin 250 mg every day, ASA  325 mg every day, and acyclovir 400 mg BID for prophylaxis      Left nondisplaced rib fracture:  Continue Vit D 10,000 international unit(s). Also we discussed zometa and that the goal is to do 2 years of monthly infusions. He will work on getting his dental work completed so we can start this.    Cough: Recommended cough suppressant but he feels ciough drops are working. He will call with worsening or fever.     Barbi Vazquez MD PhD        Again, thank you for allowing me to participate in the care of your patient.        Sincerely,        Barbi Vazquez MD

## 2021-07-01 ENCOUNTER — APPOINTMENT (OUTPATIENT)
Dept: LAB | Facility: CLINIC | Age: 51
End: 2021-07-01
Attending: STUDENT IN AN ORGANIZED HEALTH CARE EDUCATION/TRAINING PROGRAM
Payer: COMMERCIAL

## 2021-07-01 ENCOUNTER — INFUSION THERAPY VISIT (OUTPATIENT)
Dept: ONCOLOGY | Facility: CLINIC | Age: 51
End: 2021-07-01
Attending: STUDENT IN AN ORGANIZED HEALTH CARE EDUCATION/TRAINING PROGRAM
Payer: COMMERCIAL

## 2021-07-01 VITALS
BODY MASS INDEX: 27.15 KG/M2 | WEIGHT: 163.14 LBS | HEART RATE: 93 BPM | TEMPERATURE: 97.9 F | OXYGEN SATURATION: 98 % | DIASTOLIC BLOOD PRESSURE: 75 MMHG | SYSTOLIC BLOOD PRESSURE: 115 MMHG | RESPIRATION RATE: 16 BRPM

## 2021-07-01 DIAGNOSIS — C90.00 MULTIPLE MYELOMA NOT HAVING ACHIEVED REMISSION (H): Primary | ICD-10-CM

## 2021-07-01 DIAGNOSIS — E87.6 HYPOKALEMIA: ICD-10-CM

## 2021-07-01 LAB
ALBUMIN SERPL-MCNC: 3 G/DL (ref 3.4–5)
ALP SERPL-CCNC: 91 U/L (ref 40–150)
ALT SERPL W P-5'-P-CCNC: 42 U/L (ref 0–70)
ANION GAP SERPL CALCULATED.3IONS-SCNC: 9 MMOL/L (ref 3–14)
AST SERPL W P-5'-P-CCNC: 32 U/L (ref 0–45)
BASOPHILS # BLD AUTO: 0 10E9/L (ref 0–0.2)
BASOPHILS NFR BLD AUTO: 0.3 %
BILIRUB SERPL-MCNC: 0.3 MG/DL (ref 0.2–1.3)
BUN SERPL-MCNC: 15 MG/DL (ref 7–30)
CALCIUM SERPL-MCNC: 8.2 MG/DL (ref 8.5–10.1)
CHLORIDE SERPL-SCNC: 103 MMOL/L (ref 94–109)
CO2 SERPL-SCNC: 23 MMOL/L (ref 20–32)
CREAT SERPL-MCNC: 1.21 MG/DL (ref 0.66–1.25)
DIFFERENTIAL METHOD BLD: ABNORMAL
EOSINOPHIL # BLD AUTO: 0.3 10E9/L (ref 0–0.7)
EOSINOPHIL NFR BLD AUTO: 3.8 %
ERYTHROCYTE [DISTWIDTH] IN BLOOD BY AUTOMATED COUNT: 16 % (ref 10–15)
GFR SERPL CREATININE-BSD FRML MDRD: 68 ML/MIN/{1.73_M2}
GLUCOSE SERPL-MCNC: 131 MG/DL (ref 70–99)
HCT VFR BLD AUTO: 36.5 % (ref 40–53)
HGB BLD-MCNC: 12.3 G/DL (ref 13.3–17.7)
IMM GRANULOCYTES # BLD: 0 10E9/L (ref 0–0.4)
IMM GRANULOCYTES NFR BLD: 0.6 %
LYMPHOCYTES # BLD AUTO: 1.7 10E9/L (ref 0.8–5.3)
LYMPHOCYTES NFR BLD AUTO: 24.3 %
MCH RBC QN AUTO: 29.8 PG (ref 26.5–33)
MCHC RBC AUTO-ENTMCNC: 33.7 G/DL (ref 31.5–36.5)
MCV RBC AUTO: 88 FL (ref 78–100)
MONOCYTES # BLD AUTO: 0.7 10E9/L (ref 0–1.3)
MONOCYTES NFR BLD AUTO: 10.8 %
NEUTROPHILS # BLD AUTO: 4.2 10E9/L (ref 1.6–8.3)
NEUTROPHILS NFR BLD AUTO: 60.2 %
NRBC # BLD AUTO: 0 10*3/UL
NRBC BLD AUTO-RTO: 0 /100
PLATELET # BLD AUTO: 315 10E9/L (ref 150–450)
POTASSIUM SERPL-SCNC: 3.2 MMOL/L (ref 3.4–5.3)
PROT SERPL-MCNC: 6 G/DL (ref 6.8–8.8)
RBC # BLD AUTO: 4.13 10E12/L (ref 4.4–5.9)
SODIUM SERPL-SCNC: 136 MMOL/L (ref 133–144)
WBC # BLD AUTO: 6.9 10E9/L (ref 4–11)

## 2021-07-01 PROCEDURE — 80053 COMPREHEN METABOLIC PANEL: CPT | Performed by: STUDENT IN AN ORGANIZED HEALTH CARE EDUCATION/TRAINING PROGRAM

## 2021-07-01 PROCEDURE — 85025 COMPLETE CBC W/AUTO DIFF WBC: CPT | Performed by: STUDENT IN AN ORGANIZED HEALTH CARE EDUCATION/TRAINING PROGRAM

## 2021-07-01 PROCEDURE — 250N000012 HC RX MED GY IP 250 OP 636 PS 637: Performed by: STUDENT IN AN ORGANIZED HEALTH CARE EDUCATION/TRAINING PROGRAM

## 2021-07-01 PROCEDURE — 250N000013 HC RX MED GY IP 250 OP 250 PS 637: Performed by: STUDENT IN AN ORGANIZED HEALTH CARE EDUCATION/TRAINING PROGRAM

## 2021-07-01 PROCEDURE — 84166 PROTEIN E-PHORESIS/URINE/CSF: CPT | Mod: TC | Performed by: STUDENT IN AN ORGANIZED HEALTH CARE EDUCATION/TRAINING PROGRAM

## 2021-07-01 PROCEDURE — 250N000011 HC RX IP 250 OP 636: Performed by: STUDENT IN AN ORGANIZED HEALTH CARE EDUCATION/TRAINING PROGRAM

## 2021-07-01 PROCEDURE — 250N000013 HC RX MED GY IP 250 OP 250 PS 637: Performed by: NURSE PRACTITIONER

## 2021-07-01 PROCEDURE — 84166 PROTEIN E-PHORESIS/URINE/CSF: CPT | Mod: 26 | Performed by: PATHOLOGY

## 2021-07-01 PROCEDURE — 36415 COLL VENOUS BLD VENIPUNCTURE: CPT

## 2021-07-01 PROCEDURE — 96401 CHEMO ANTI-NEOPL SQ/IM: CPT

## 2021-07-01 RX ORDER — ACETAMINOPHEN 325 MG/1
650 TABLET ORAL ONCE
Status: COMPLETED | OUTPATIENT
Start: 2021-07-01 | End: 2021-07-01

## 2021-07-01 RX ORDER — DEXAMETHASONE 4 MG/1
20 TABLET ORAL ONCE
Status: COMPLETED | OUTPATIENT
Start: 2021-07-01 | End: 2021-07-01

## 2021-07-01 RX ORDER — DIPHENHYDRAMINE HCL 25 MG
50 CAPSULE ORAL ONCE
Status: COMPLETED | OUTPATIENT
Start: 2021-07-01 | End: 2021-07-01

## 2021-07-01 RX ORDER — POTASSIUM CHLORIDE 1500 MG/1
40 TABLET, EXTENDED RELEASE ORAL ONCE
Status: COMPLETED | OUTPATIENT
Start: 2021-07-01 | End: 2021-07-01

## 2021-07-01 RX ADMIN — DEXAMETHASONE 20 MG: 4 TABLET ORAL at 15:54

## 2021-07-01 RX ADMIN — DARATUMUMAB AND HYALURONIDASE-FIHJ (HUMAN RECOMBINANT) 1800 MG: 1800; 30000 INJECTION SUBCUTANEOUS at 16:59

## 2021-07-01 RX ADMIN — BORTEZOMIB 2.5 MG: 3.5 INJECTION, POWDER, LYOPHILIZED, FOR SOLUTION INTRAVENOUS; SUBCUTANEOUS at 16:55

## 2021-07-01 RX ADMIN — DIPHENHYDRAMINE HYDROCHLORIDE 50 MG: 25 CAPSULE ORAL at 15:54

## 2021-07-01 RX ADMIN — ACETAMINOPHEN 650 MG: 325 TABLET ORAL at 15:54

## 2021-07-01 RX ADMIN — POTASSIUM CHLORIDE 40 MEQ: 1500 TABLET, EXTENDED RELEASE ORAL at 17:08

## 2021-07-01 ASSESSMENT — PAIN SCALES - GENERAL: PAINLEVEL: NO PAIN (0)

## 2021-07-01 NOTE — NURSING NOTE
Chief Complaint   Patient presents with     Blood Draw     Labs drawn via  placed by RN in lab. VS taken.      Michael Marin RN

## 2021-07-01 NOTE — PATIENT INSTRUCTIONS
[facility] Triage and after hours / weekends / holidays:  [phone]    Please call the triage or after hours line if you experience a temperature greater than or equal to 100.5, shaking chills, have uncontrolled nausea, vomiting and/or diarrhea, dizziness, shortness of breath, chest pain, bleeding, unexplained bruising, or if you have any other new/concerning symptoms, questions or concerns.      If you are having any concerning symptoms or wish to speak to a provider before your next infusion visit, please call your care coordinator or triage to notify them so we can adequately serve you.     If you need a refill on a narcotic prescription or other medication, please call before your infusion appointment.                 July 2021 Sunday Monday Tuesday Wednesday Thursday Friday Saturday                       1    LAB PERIPHERAL   2:15 PM   (15 min.)   [facility] LAB DRAW   [facility]    ONC INFUSION 1 HR (60 MIN)   3:00 PM   (60 min.)    ONC INFUSION NURSE   [facility] 2     3       4     5     6     7     8     9     10       11     12     13     14     15     16     17       18     19     20    VIDEO VISIT RETURN   3:45 PM   (45 min.)   Alba Moses, PATTY CNP   [facility] 21     22     23     24       25     26     27     28     29     30     31                 August 2021 Sunday Monday Tuesday Wednesday Thursday Friday Saturday   1     2     3     4     5     6     7       8     9     10     11     12     13     14       15     16     17     18     19     20     21       22     23     24     25     26     27     28       29     30     31                                         Lab Results:  Recent Results (from the past 12 hour(s))   CBC with platelets differential    Collection Time: 07/01/21  2:41 PM   Result Value Ref Range    WBC 6.9 4.0 - 11.0 10e9/L    RBC Count 4.13 (L) 4.4 - 5.9 10e12/L    Hemoglobin  12.3 (L) 13.3 - 17.7 g/dL    Hematocrit 36.5 (L) 40.0 - 53.0 %    MCV 88 78 - 100 fl    MCH 29.8 26.5 - 33.0 pg    MCHC 33.7 31.5 - 36.5 g/dL    RDW 16.0 (H) 10.0 - 15.0 %    Platelet Count 315 150 - 450 10e9/L    Diff Method Automated Method     % Neutrophils 60.2 %    % Lymphocytes 24.3 %    % Monocytes 10.8 %    % Eosinophils 3.8 %    % Basophils 0.3 %    % Immature Granulocytes 0.6 %    Nucleated RBCs 0 0 /100    Absolute Neutrophil 4.2 1.6 - 8.3 10e9/L    Absolute Lymphocytes 1.7 0.8 - 5.3 10e9/L    Absolute Monocytes 0.7 0.0 - 1.3 10e9/L    Absolute Eosinophils 0.3 0.0 - 0.7 10e9/L    Absolute Basophils 0.0 0.0 - 0.2 10e9/L    Abs Immature Granulocytes 0.0 0 - 0.4 10e9/L    Absolute Nucleated RBC 0.0    Comprehensive metabolic panel (BMP + Alb, Alk Phos, ALT, AST, Total. Bili, TP)    Collection Time: 07/01/21  2:41 PM   Result Value Ref Range    Sodium 136 133 - 144 mmol/L    Potassium 3.2 (L) 3.4 - 5.3 mmol/L    Chloride 103 94 - 109 mmol/L    Carbon Dioxide 23 20 - 32 mmol/L    Anion Gap 9 3 - 14 mmol/L    Glucose 131 (H) 70 - 99 mg/dL    Urea Nitrogen 15 7 - 30 mg/dL    Creatinine 1.21 0.66 - 1.25 mg/dL    GFR Estimate 68 >60 mL/min/[1.73_m2]    GFR Estimate If Black 79 >60 mL/min/[1.73_m2]    Calcium 8.2 (L) 8.5 - 10.1 mg/dL    Bilirubin Total 0.3 0.2 - 1.3 mg/dL    Albumin 3.0 (L) 3.4 - 5.0 g/dL    Protein Total 6.0 (L) 6.8 - 8.8 g/dL    Alkaline Phosphatase 91 40 - 150 U/L    ALT 42 0 - 70 U/L    AST 32 0 - 45 U/L

## 2021-07-01 NOTE — PROGRESS NOTES
"Infusion Nursing Note:  June Ronquillo presents today for Day 15 Cycle 2 Velcade and Daratumumab injection  Patient seen by provider today: No   present during visit today: Not Applicable.    Note: Patient presents to infusion feeling ok. Patient denies pain and states no acute complaints or concerns not addressed by Dr. Vazquez during appointment yesterday 6/30/2021. Patient confirms no worsening cough today or s/s of infection such as fever, sore throat, shortness of breath, or changes in taste/smell. On Cycle 1 Day 15, patient did not receive Velcade. Today patient is ordered both Velcade and Daratumumab injection. Also, per George's note from yesterday \"Dose reduce dex to 80 mg d/t fatigue and stomach upset on dex days\". Called physician to clarify whether patient is receiving post injections today and also clarified PO Dex since patient receives 20mg on injection day and takes 20 mg day after injection day. Patient states he is on day 2 of his off week from Revlimid and confirms he has Dex to take at home.    Dr. Vazquez called to clarify PO Dex and medications to be given today. Dr. Vazquez thought patient took 5-20mg tablets of oral Dex to equal 100mg the day after injection. Per [place] pharmacy who filled the PO Dex, 20mg are not available at this pharmacy, therefore 4mg PO Dex tablets likely given.     TORB. 7/1/2021. 1535. Per Dr. Vazquez. Floyd Rivero RN. , patient is to continue to take 20mg of PO Dex post injection day. Patient is to receive both Velcade and subcutaneous Miracle today.    Potassium 3.2. Patient states he hasn't been taking 10MEQ of potassium twice a day as prescribed do to a filling error at his pharmacy. Patient states he was given half the amount of medicine he needed to be able to take medicine twice a day. Patient states he got the right amount of medication the other day and will restart taking medication today.    TORB. 7/1/2021. 1624. Alba CALVIN. Floyd " Mat DIXON. Replace per protocol and have patient restart medication as planned      Patient is aware of the above orders and verbalizes Understanding to take 20mg of PO Dex tomorrow and to restart oral potassium at home now that he has medication available again.    Intravenous Access:  No Intravenous access at this visit.    Treatment Conditions:  Lab Results   Component Value Date    HGB 12.3 07/01/2021     Lab Results   Component Value Date    WBC 6.9 07/01/2021      Lab Results   Component Value Date    ANEU 4.2 07/01/2021     Lab Results   Component Value Date     07/01/2021      Lab Results   Component Value Date     07/01/2021                   Lab Results   Component Value Date    POTASSIUM 3.2 07/01/2021           No results found for: MAG         Lab Results   Component Value Date    CR 1.21 07/01/2021                   Lab Results   Component Value Date    HARDEEP 8.2 07/01/2021                Lab Results   Component Value Date    BILITOTAL 0.3 07/01/2021           Lab Results   Component Value Date    ALBUMIN 3.0 07/01/2021                    Lab Results   Component Value Date    ALT 42 07/01/2021           Lab Results   Component Value Date    AST 32 07/01/2021       Results reviewed, labs MET treatment parameters, ok to proceed with treatment.      Post Infusion Assessment:  Patient tolerated 1 Velcade injection in back of left arm without incident.   Patient tolerated 1 Darzalex Faspro injection in upper left abdomen over 5 minutes with 23 gauge butterfly needle without incident.         Discharge Plan:   Patient declined prescription refills.  Discharge instructions reviewed with: Patient.  Patient and/or family verbalized understanding of discharge instructions and all questions answered.  AVS to patient via [employer].  Patient will return next week for next appointment. IB sent to make that appointment   Patient discharged in stable condition accompanied by: self.  Departure Mode:  Ambulatory.  Face to Face time: 0 minutes.      Floyd Rivero RN

## 2021-07-02 DIAGNOSIS — I25.10 CORONARY ARTERY DISEASE INVOLVING NATIVE CORONARY ARTERY OF NATIVE HEART WITHOUT ANGINA PECTORIS: ICD-10-CM

## 2021-07-02 LAB
PROT ELPH PNL UR ELPH: NORMAL
PROT PATTERN UR ELPH-IMP: NORMAL

## 2021-07-02 RX ORDER — LOSARTAN POTASSIUM 50 MG/1
50 TABLET ORAL DAILY
Qty: 90 TABLET | Refills: 2 | Status: ON HOLD | OUTPATIENT
Start: 2021-07-02 | End: 2021-11-22

## 2021-07-09 ENCOUNTER — INFUSION THERAPY VISIT (OUTPATIENT)
Dept: ONCOLOGY | Facility: CLINIC | Age: 51
End: 2021-07-09
Attending: STUDENT IN AN ORGANIZED HEALTH CARE EDUCATION/TRAINING PROGRAM
Payer: COMMERCIAL

## 2021-07-09 VITALS
HEART RATE: 88 BPM | DIASTOLIC BLOOD PRESSURE: 73 MMHG | RESPIRATION RATE: 16 BRPM | SYSTOLIC BLOOD PRESSURE: 117 MMHG | TEMPERATURE: 97.9 F | BODY MASS INDEX: 27.89 KG/M2 | OXYGEN SATURATION: 99 % | WEIGHT: 167.6 LBS

## 2021-07-09 DIAGNOSIS — C90.00 MULTIPLE MYELOMA NOT HAVING ACHIEVED REMISSION (H): Primary | ICD-10-CM

## 2021-07-09 LAB
ALBUMIN SERPL-MCNC: 3.2 G/DL (ref 3.4–5)
ALP SERPL-CCNC: 76 U/L (ref 40–150)
ALT SERPL W P-5'-P-CCNC: 25 U/L (ref 0–70)
ANION GAP SERPL CALCULATED.3IONS-SCNC: 5 MMOL/L (ref 3–14)
AST SERPL W P-5'-P-CCNC: 23 U/L (ref 0–45)
BASOPHILS # BLD AUTO: 0 10E9/L (ref 0–0.2)
BASOPHILS NFR BLD AUTO: 0.3 %
BILIRUB SERPL-MCNC: 0.4 MG/DL (ref 0.2–1.3)
BUN SERPL-MCNC: 8 MG/DL (ref 7–30)
CALCIUM SERPL-MCNC: 8.4 MG/DL (ref 8.5–10.1)
CHLORIDE SERPL-SCNC: 107 MMOL/L (ref 94–109)
CO2 SERPL-SCNC: 24 MMOL/L (ref 20–32)
CREAT SERPL-MCNC: 0.87 MG/DL (ref 0.66–1.25)
DIFFERENTIAL METHOD BLD: ABNORMAL
EOSINOPHIL # BLD AUTO: 0.6 10E9/L (ref 0–0.7)
EOSINOPHIL NFR BLD AUTO: 6.1 %
ERYTHROCYTE [DISTWIDTH] IN BLOOD BY AUTOMATED COUNT: 17 % (ref 10–15)
GFR SERPL CREATININE-BSD FRML MDRD: >90 ML/MIN/{1.73_M2}
GLUCOSE SERPL-MCNC: 91 MG/DL (ref 70–99)
HCT VFR BLD AUTO: 32.1 % (ref 40–53)
HGB BLD-MCNC: 10.9 G/DL (ref 13.3–17.7)
IMM GRANULOCYTES # BLD: 0.1 10E9/L (ref 0–0.4)
IMM GRANULOCYTES NFR BLD: 0.8 %
LYMPHOCYTES # BLD AUTO: 2.7 10E9/L (ref 0.8–5.3)
LYMPHOCYTES NFR BLD AUTO: 28.1 %
MCH RBC QN AUTO: 30.1 PG (ref 26.5–33)
MCHC RBC AUTO-ENTMCNC: 34 G/DL (ref 31.5–36.5)
MCV RBC AUTO: 89 FL (ref 78–100)
MONOCYTES # BLD AUTO: 1.5 10E9/L (ref 0–1.3)
MONOCYTES NFR BLD AUTO: 15.8 %
NEUTROPHILS # BLD AUTO: 4.7 10E9/L (ref 1.6–8.3)
NEUTROPHILS NFR BLD AUTO: 48.9 %
NRBC # BLD AUTO: 0 10*3/UL
NRBC BLD AUTO-RTO: 0 /100
PLATELET # BLD AUTO: 486 10E9/L (ref 150–450)
POTASSIUM SERPL-SCNC: 3.7 MMOL/L (ref 3.4–5.3)
PROT SERPL-MCNC: 6.1 G/DL (ref 6.8–8.8)
RBC # BLD AUTO: 3.62 10E12/L (ref 4.4–5.9)
SODIUM SERPL-SCNC: 137 MMOL/L (ref 133–144)
WBC # BLD AUTO: 9.6 10E9/L (ref 4–11)

## 2021-07-09 PROCEDURE — 84165 PROTEIN E-PHORESIS SERUM: CPT | Mod: 26 | Performed by: PATHOLOGY

## 2021-07-09 PROCEDURE — 82784 ASSAY IGA/IGD/IGG/IGM EACH: CPT | Performed by: STUDENT IN AN ORGANIZED HEALTH CARE EDUCATION/TRAINING PROGRAM

## 2021-07-09 PROCEDURE — 83883 ASSAY NEPHELOMETRY NOT SPEC: CPT | Performed by: STUDENT IN AN ORGANIZED HEALTH CARE EDUCATION/TRAINING PROGRAM

## 2021-07-09 PROCEDURE — 86334 IMMUNOFIX E-PHORESIS SERUM: CPT | Mod: 26 | Performed by: PATHOLOGY

## 2021-07-09 PROCEDURE — 250N000012 HC RX MED GY IP 250 OP 636 PS 637: Performed by: STUDENT IN AN ORGANIZED HEALTH CARE EDUCATION/TRAINING PROGRAM

## 2021-07-09 PROCEDURE — 36415 COLL VENOUS BLD VENIPUNCTURE: CPT

## 2021-07-09 PROCEDURE — 250N000011 HC RX IP 250 OP 636: Performed by: STUDENT IN AN ORGANIZED HEALTH CARE EDUCATION/TRAINING PROGRAM

## 2021-07-09 PROCEDURE — 96401 CHEMO ANTI-NEOPL SQ/IM: CPT

## 2021-07-09 PROCEDURE — 80053 COMPREHEN METABOLIC PANEL: CPT | Performed by: STUDENT IN AN ORGANIZED HEALTH CARE EDUCATION/TRAINING PROGRAM

## 2021-07-09 PROCEDURE — 84165 PROTEIN E-PHORESIS SERUM: CPT | Mod: TC | Performed by: STUDENT IN AN ORGANIZED HEALTH CARE EDUCATION/TRAINING PROGRAM

## 2021-07-09 PROCEDURE — 999N001036 HC STATISTIC TOTAL PROTEIN: Performed by: STUDENT IN AN ORGANIZED HEALTH CARE EDUCATION/TRAINING PROGRAM

## 2021-07-09 PROCEDURE — 250N000013 HC RX MED GY IP 250 OP 250 PS 637: Performed by: STUDENT IN AN ORGANIZED HEALTH CARE EDUCATION/TRAINING PROGRAM

## 2021-07-09 PROCEDURE — 86334 IMMUNOFIX E-PHORESIS SERUM: CPT | Mod: TC | Performed by: STUDENT IN AN ORGANIZED HEALTH CARE EDUCATION/TRAINING PROGRAM

## 2021-07-09 PROCEDURE — 85025 COMPLETE CBC W/AUTO DIFF WBC: CPT | Performed by: STUDENT IN AN ORGANIZED HEALTH CARE EDUCATION/TRAINING PROGRAM

## 2021-07-09 RX ORDER — ACETAMINOPHEN 325 MG/1
650 TABLET ORAL ONCE
Status: COMPLETED | OUTPATIENT
Start: 2021-07-09 | End: 2021-07-09

## 2021-07-09 RX ORDER — DEXAMETHASONE 4 MG/1
20 TABLET ORAL ONCE
Status: COMPLETED | OUTPATIENT
Start: 2021-07-09 | End: 2021-07-09

## 2021-07-09 RX ORDER — DIPHENHYDRAMINE HCL 25 MG
50 CAPSULE ORAL ONCE
Status: COMPLETED | OUTPATIENT
Start: 2021-07-09 | End: 2021-07-09

## 2021-07-09 RX ORDER — DEXAMETHASONE 4 MG/1
TABLET ORAL
Qty: 15 TABLET | Refills: 0 | Status: SHIPPED | OUTPATIENT
Start: 2021-07-09 | End: 2021-08-06

## 2021-07-09 RX ADMIN — DARATUMUMAB AND HYALURONIDASE-FIHJ (HUMAN RECOMBINANT) 1800 MG: 1800; 30000 INJECTION SUBCUTANEOUS at 16:29

## 2021-07-09 RX ADMIN — ACETAMINOPHEN 650 MG: 325 TABLET ORAL at 15:36

## 2021-07-09 RX ADMIN — BORTEZOMIB 2.5 MG: 3.5 INJECTION, POWDER, LYOPHILIZED, FOR SOLUTION INTRAVENOUS; SUBCUTANEOUS at 16:29

## 2021-07-09 RX ADMIN — DEXAMETHASONE 20 MG: 4 TABLET ORAL at 15:36

## 2021-07-09 RX ADMIN — DIPHENHYDRAMINE HYDROCHLORIDE 50 MG: 25 CAPSULE ORAL at 15:36

## 2021-07-09 ASSESSMENT — PAIN SCALES - GENERAL: PAINLEVEL: NO PAIN (0)

## 2021-07-09 NOTE — NURSING NOTE
Chief Complaint   Patient presents with     Blood Draw     Labs drawn via  by rn in lab. VS taken.     Labs collected from venipuncture by RN. Vitals taken. Checked in for appointment(s).    Skinny Jensen RN

## 2021-07-09 NOTE — PROGRESS NOTES
Infusion Nursing Note:  June Ronquillo presents today for C3D1 Velcade/Darzalex Faspro.    Patient seen by provider today: No   present during visit today: Not Applicable.    Note: Patient reported to clinic today with no new complaints or concerns.  Patient did meet criteria for an asymptomatic covid-19 PCR test in infusion today. Patient declined the covid-19 test.    Intravenous Access:  Labs drawn without difficulty.    Treatment Conditions:  Lab Results   Component Value Date    HGB 10.9 07/09/2021     Lab Results   Component Value Date    WBC 9.6 07/09/2021      Lab Results   Component Value Date    ANEU 4.7 07/09/2021     Lab Results   Component Value Date     07/09/2021      Lab Results   Component Value Date     07/09/2021                   Lab Results   Component Value Date    POTASSIUM 3.7 07/09/2021           No results found for: MAG         Lab Results   Component Value Date    CR 0.87 07/09/2021                   Lab Results   Component Value Date    HARDEEP 8.4 07/09/2021                Lab Results   Component Value Date    BILITOTAL 0.4 07/09/2021           Lab Results   Component Value Date    ALBUMIN 3.2 07/09/2021                    Lab Results   Component Value Date    ALT 25 07/09/2021           Lab Results   Component Value Date    AST 23 07/09/2021       Results reviewed, labs MET treatment parameters, ok to proceed with treatment.      Post Infusion Assessment:  Patient tolerated injection without incident.     One injection of Darzalex Faspro given in right side abdomen sq  One injection of Velcade given in back of left arm subcutaneous    Discharge Plan:   Prescription refills given for Dexamethasone.  Discharge instructions reviewed with: Patient.  Patient and/or family verbalized understanding of discharge instructions and all questions answered.  AVS to patient via [employer]  Patient will return not scheduled yet, pt aware to watch ScriptPad[employer] for next appointment.    Patient discharged in stable condition accompanied by: self.  Departure Mode: Ambulatory.  Face to Face time: 5 minutes.      Ramsey Baum RN

## 2021-07-09 NOTE — PATIENT INSTRUCTIONS
[facility] Main Line: [phone]    Call triage nurse with chills and/or temperature greater than or equal to 100.4, uncontrolled nausea/vomiting, diarrhea, constipation, dizziness, shortness of breath, chest pain, bleeding, unexplained bruising, or any new/concerning symptoms, questions/concerns.   If you are having any concerning symptoms or wish to speak to a provider before your next infusion visit, please call your care coordinator or triage to notify them so we can adequately serve you.   Nurse Triage line:  [phone]    If after hours, weekends, or holidays, call main hospital  and ask for Oncology doctor on call @ [phone]      July 2021 Sunday Monday Tuesday Wednesday Thursday Friday Saturday                       1    LAB PERIPHERAL   2:15 PM   (15 min.)   [facility] LAB DRAW   [facility]    ONC INFUSION 1 HR (60 MIN)   3:00 PM   (60 min.)    ONC INFUSION NURSE   [facility] 2     3       4     5     6     7     8     9    LAB PERIPHERAL   2:00 PM   (15 min.)   [facility] LAB DRAW   [facility]    ONC INFUSION 1 HR (60 MIN)   3:00 PM   (60 min.)    ONC INFUSION NURSE   [facility] 10       11     12     13     14     15     16     17       18     19     20    VIDEO VISIT RETURN   3:45 PM   (45 min.)   Alba Moses APRN CNP   [facility] 21     22     23     24       25     26     27     28     29     30     31                 August 2021 Sunday Monday Tuesday Wednesday Thursday Friday Saturday   1     2     3     4     5     6     7       8     9     10     11     12     13     14       15     16     17     18     19     20     21       22     23     24     25     26     27     28       29     30     31                                         Lab Results:  Recent Results (from the past 12 hour(s))   CBC with platelets  differential    Collection Time: 07/09/21  2:40 PM   Result Value Ref Range    WBC 9.6 4.0 - 11.0 10e9/L    RBC Count 3.62 (L) 4.4 - 5.9 10e12/L    Hemoglobin 10.9 (L) 13.3 - 17.7 g/dL    Hematocrit 32.1 (L) 40.0 - 53.0 %    MCV 89 78 - 100 fl    MCH 30.1 26.5 - 33.0 pg    MCHC 34.0 31.5 - 36.5 g/dL    RDW 17.0 (H) 10.0 - 15.0 %    Platelet Count 486 (H) 150 - 450 10e9/L    Diff Method Automated Method     % Neutrophils 48.9 %    % Lymphocytes 28.1 %    % Monocytes 15.8 %    % Eosinophils 6.1 %    % Basophils 0.3 %    % Immature Granulocytes 0.8 %    Nucleated RBCs 0 0 /100    Absolute Neutrophil 4.7 1.6 - 8.3 10e9/L    Absolute Lymphocytes 2.7 0.8 - 5.3 10e9/L    Absolute Monocytes 1.5 (H) 0.0 - 1.3 10e9/L    Absolute Eosinophils 0.6 0.0 - 0.7 10e9/L    Absolute Basophils 0.0 0.0 - 0.2 10e9/L    Abs Immature Granulocytes 0.1 0 - 0.4 10e9/L    Absolute Nucleated RBC 0.0    Comprehensive metabolic panel    Collection Time: 07/09/21  2:40 PM   Result Value Ref Range    Sodium 137 133 - 144 mmol/L    Potassium 3.7 3.4 - 5.3 mmol/L    Chloride 107 94 - 109 mmol/L    Carbon Dioxide 24 20 - 32 mmol/L    Anion Gap 5 3 - 14 mmol/L    Glucose 91 70 - 99 mg/dL    Urea Nitrogen 8 7 - 30 mg/dL    Creatinine 0.87 0.66 - 1.25 mg/dL    GFR Estimate >90 >60 mL/min/[1.73_m2]    GFR Estimate If Black >90 >60 mL/min/[1.73_m2]    Calcium 8.4 (L) 8.5 - 10.1 mg/dL    Bilirubin Total 0.4 0.2 - 1.3 mg/dL    Albumin 3.2 (L) 3.4 - 5.0 g/dL    Protein Total 6.1 (L) 6.8 - 8.8 g/dL    Alkaline Phosphatase 76 40 - 150 U/L    ALT 25 0 - 70 U/L    AST 23 0 - 45 U/L

## 2021-07-12 LAB
ALBUMIN SERPL ELPH-MCNC: 3.6 G/DL (ref 3.7–5.1)
ALPHA1 GLOB SERPL ELPH-MCNC: 0.4 G/DL (ref 0.2–0.4)
ALPHA2 GLOB SERPL ELPH-MCNC: 0.7 G/DL (ref 0.5–0.9)
B-GLOBULIN SERPL ELPH-MCNC: 0.7 G/DL (ref 0.6–1)
GAMMA GLOB SERPL ELPH-MCNC: 0.5 G/DL (ref 0.7–1.6)
IGA SERPL-MCNC: 26 MG/DL (ref 84–499)
IGG SERPL-MCNC: 531 MG/DL (ref 610–1616)
IGM SERPL-MCNC: 30 MG/DL (ref 35–242)
KAPPA LC UR-MCNC: 0.69 MG/DL (ref 0.33–1.94)
KAPPA LC/LAMBDA SER: 0.97 {RATIO} (ref 0.26–1.65)
LAMBDA LC SERPL-MCNC: 0.71 MG/DL (ref 0.57–2.63)
M PROTEIN SERPL ELPH-MCNC: 0.1 G/DL
PROT PATTERN SERPL ELPH-IMP: ABNORMAL
PROT PATTERN SERPL IFE-IMP: ABNORMAL

## 2021-07-14 DIAGNOSIS — I10 HYPERTENSION, UNSPECIFIED TYPE: Primary | ICD-10-CM

## 2021-07-15 ENCOUNTER — TELEPHONE (OUTPATIENT)
Dept: ONCOLOGY | Facility: CLINIC | Age: 51
End: 2021-07-15

## 2021-07-15 RX ORDER — POTASSIUM CHLORIDE 750 MG/1
1 TABLET, EXTENDED RELEASE ORAL 2 TIMES DAILY
COMMUNITY
Start: 2021-06-29 | End: 2021-08-23

## 2021-07-15 RX ORDER — HYDROCHLOROTHIAZIDE 50 MG/1
50 TABLET ORAL DAILY
Qty: 30 TABLET | Refills: 3 | Status: ON HOLD | OUTPATIENT
Start: 2021-07-15 | End: 2021-11-11

## 2021-07-15 NOTE — TELEPHONE ENCOUNTER
Oral Chemotherapy Monitoring Program    Medication: Revlimid  Rx:  25mg PO daily on days 1-14 of 21 day cycle    Auth #: [id number]    Risk Category: Adult male    Routine survey questions reviewed.    Thank you, if you have any further questions please feel free to contact me.    Ana Cristina Matta  Lead Oncology Liaison  [email or web address]  Phone: [phone]  Fax: [phone]

## 2021-07-16 ENCOUNTER — INFUSION THERAPY VISIT (OUTPATIENT)
Dept: ONCOLOGY | Facility: CLINIC | Age: 51
End: 2021-07-16
Attending: NURSE PRACTITIONER
Payer: COMMERCIAL

## 2021-07-16 ENCOUNTER — APPOINTMENT (OUTPATIENT)
Dept: LAB | Facility: CLINIC | Age: 51
End: 2021-07-16
Attending: NURSE PRACTITIONER
Payer: COMMERCIAL

## 2021-07-16 VITALS
TEMPERATURE: 98.1 F | SYSTOLIC BLOOD PRESSURE: 111 MMHG | RESPIRATION RATE: 16 BRPM | HEART RATE: 81 BPM | WEIGHT: 161.5 LBS | OXYGEN SATURATION: 99 % | DIASTOLIC BLOOD PRESSURE: 71 MMHG | BODY MASS INDEX: 26.88 KG/M2

## 2021-07-16 DIAGNOSIS — C90.00 MULTIPLE MYELOMA NOT HAVING ACHIEVED REMISSION (H): Primary | ICD-10-CM

## 2021-07-16 LAB
ALBUMIN SERPL-MCNC: 3.1 G/DL (ref 3.4–5)
ALP SERPL-CCNC: 63 U/L (ref 40–150)
ALT SERPL W P-5'-P-CCNC: 21 U/L (ref 0–70)
ANION GAP SERPL CALCULATED.3IONS-SCNC: 7 MMOL/L (ref 3–14)
AST SERPL W P-5'-P-CCNC: 16 U/L (ref 0–45)
BASOPHILS # BLD AUTO: 0 10E3/UL (ref 0–0.2)
BASOPHILS NFR BLD AUTO: 0 %
BILIRUB SERPL-MCNC: 0.4 MG/DL (ref 0.2–1.3)
BUN SERPL-MCNC: 9 MG/DL (ref 7–30)
CALCIUM SERPL-MCNC: 8.4 MG/DL (ref 8.5–10.1)
CHLORIDE BLD-SCNC: 106 MMOL/L (ref 94–109)
CO2 SERPL-SCNC: 26 MMOL/L (ref 20–32)
CREAT SERPL-MCNC: 0.9 MG/DL (ref 0.66–1.25)
EOSINOPHIL # BLD AUTO: 0.4 10E3/UL (ref 0–0.7)
EOSINOPHIL NFR BLD AUTO: 4 %
ERYTHROCYTE [DISTWIDTH] IN BLOOD BY AUTOMATED COUNT: 17.2 % (ref 10–15)
GFR SERPL CREATININE-BSD FRML MDRD: >90 ML/MIN/1.73M2
GLUCOSE BLD-MCNC: 116 MG/DL (ref 70–99)
HCT VFR BLD AUTO: 34.1 % (ref 40–53)
HGB BLD-MCNC: 11.1 G/DL (ref 13.3–17.7)
IMM GRANULOCYTES # BLD: 0 10E3/UL
IMM GRANULOCYTES NFR BLD: 1 %
LYMPHOCYTES # BLD AUTO: 1.8 10E3/UL (ref 0.8–5.3)
LYMPHOCYTES NFR BLD AUTO: 21 %
MCH RBC QN AUTO: 29.7 PG (ref 26.5–33)
MCHC RBC AUTO-ENTMCNC: 32.6 G/DL (ref 31.5–36.5)
MCV RBC AUTO: 91 FL (ref 78–100)
MONOCYTES # BLD AUTO: 0.8 10E3/UL (ref 0–1.3)
MONOCYTES NFR BLD AUTO: 9 %
NEUTROPHILS # BLD AUTO: 5.8 10E3/UL (ref 1.6–8.3)
NEUTROPHILS NFR BLD AUTO: 65 %
NRBC # BLD AUTO: 0 10E3/UL
NRBC BLD AUTO-RTO: 0 /100
PLATELET # BLD AUTO: 382 10E3/UL (ref 150–450)
POTASSIUM BLD-SCNC: 3.6 MMOL/L (ref 3.4–5.3)
PROT SERPL-MCNC: 6 G/DL (ref 6.8–8.8)
RBC # BLD AUTO: 3.74 10E6/UL (ref 4.4–5.9)
SODIUM SERPL-SCNC: 139 MMOL/L (ref 133–144)
WBC # BLD AUTO: 8.9 10E3/UL (ref 4–11)

## 2021-07-16 PROCEDURE — 36415 COLL VENOUS BLD VENIPUNCTURE: CPT

## 2021-07-16 PROCEDURE — 80053 COMPREHEN METABOLIC PANEL: CPT

## 2021-07-16 PROCEDURE — 85004 AUTOMATED DIFF WBC COUNT: CPT

## 2021-07-16 PROCEDURE — 250N000011 HC RX IP 250 OP 636: Performed by: STUDENT IN AN ORGANIZED HEALTH CARE EDUCATION/TRAINING PROGRAM

## 2021-07-16 PROCEDURE — 96401 CHEMO ANTI-NEOPL SQ/IM: CPT

## 2021-07-16 PROCEDURE — 250N000012 HC RX MED GY IP 250 OP 636 PS 637: Performed by: STUDENT IN AN ORGANIZED HEALTH CARE EDUCATION/TRAINING PROGRAM

## 2021-07-16 PROCEDURE — 250N000013 HC RX MED GY IP 250 OP 250 PS 637: Performed by: STUDENT IN AN ORGANIZED HEALTH CARE EDUCATION/TRAINING PROGRAM

## 2021-07-16 RX ORDER — DEXAMETHASONE 4 MG/1
20 TABLET ORAL ONCE
Status: COMPLETED | OUTPATIENT
Start: 2021-07-16 | End: 2021-07-16

## 2021-07-16 RX ORDER — DIPHENHYDRAMINE HCL 25 MG
50 CAPSULE ORAL ONCE
Status: COMPLETED | OUTPATIENT
Start: 2021-07-16 | End: 2021-07-16

## 2021-07-16 RX ORDER — ACETAMINOPHEN 325 MG/1
650 TABLET ORAL ONCE
Status: COMPLETED | OUTPATIENT
Start: 2021-07-16 | End: 2021-07-16

## 2021-07-16 RX ADMIN — DIPHENHYDRAMINE HYDROCHLORIDE 50 MG: 25 CAPSULE ORAL at 15:22

## 2021-07-16 RX ADMIN — DARATUMUMAB AND HYALURONIDASE-FIHJ (HUMAN RECOMBINANT) 1800 MG: 1800; 30000 INJECTION SUBCUTANEOUS at 16:33

## 2021-07-16 RX ADMIN — BORTEZOMIB 2.5 MG: 3.5 INJECTION, POWDER, LYOPHILIZED, FOR SOLUTION INTRAVENOUS; SUBCUTANEOUS at 15:48

## 2021-07-16 RX ADMIN — DEXAMETHASONE 20 MG: 4 TABLET ORAL at 15:22

## 2021-07-16 RX ADMIN — ACETAMINOPHEN 650 MG: 325 TABLET ORAL at 15:22

## 2021-07-16 ASSESSMENT — PAIN SCALES - GENERAL: PAINLEVEL: MILD PAIN (3)

## 2021-07-16 NOTE — PROGRESS NOTES
Infusion Nursing Note:  June Ronquillo presents today for D8 C3 Velcade subcutaneous, Darzalex Faspro.    Patient seen by provider today: No    Intravenous Access:  Labs drawn in lab.    Treatment Conditions:  Lab Results   Component Value Date    HGB 11.1 07/16/2021    HGB 10.9 07/09/2021     Lab Results   Component Value Date    WBC 8.9 07/16/2021    WBC 9.6 07/09/2021      Lab Results   Component Value Date    ANEU 4.7 07/09/2021     Lab Results   Component Value Date     07/16/2021     07/09/2021      Lab Results   Component Value Date     07/16/2021     07/09/2021                   Lab Results   Component Value Date    POTASSIUM 3.6 07/16/2021    POTASSIUM 3.7 07/09/2021           No results found for: MAG         Lab Results   Component Value Date    CR 0.90 07/16/2021    CR 0.87 07/09/2021                   Lab Results   Component Value Date    HARDEEP 8.4 07/16/2021    HARDEEP 8.4 07/09/2021                Lab Results   Component Value Date    BILITOTAL 0.4 07/16/2021    BILITOTAL 0.4 07/09/2021           Lab Results   Component Value Date    ALBUMIN 3.1 07/16/2021    ALBUMIN 3.2 07/09/2021                    Lab Results   Component Value Date    ALT 21 07/16/2021    ALT 25 07/09/2021           Lab Results   Component Value Date    AST 16 07/16/2021    AST 23 07/09/2021       Results reviewed, labs MET treatment parameters, ok to proceed with treatment.    Note: Patient presents to infusion feeling at baseline. Denied s/s of infection or any other new issues. Reports stomach has slight pain for the last 3-4 days. States he will start protonix if it worsens or does not improve.     Post Infusion Assessment:  Patient tolerated 1 Velcade injection in left arm without incident.  Patient tolerated 1 Darzalex Faspro injection in abdomen without incident.    Discharge Plan:   Patient declined prescription refills.  Discharge instructions reviewed with: Patient.  Patient and/or family  verbalized understanding of discharge instructions and all questions answered.  AVS to patient via [employer].  Patient will return 7/20/21-[place], 7/23/21-labs/infusionfor next appointment.   Patient discharged in stable condition accompanied by: self.  Departure Mode: Ambulatory.    Candice Shah RN

## 2021-07-16 NOTE — PATIENT INSTRUCTIONS
Contact Numbers  [facility] Nurse Triage: [phone]    Please call the [facility] Triage line if you experience a temperature greater than or equal to 100.5, shaking chills, have uncontrolled nausea, vomiting and/or diarrhea, dizziness, shortness of breath, chest pain, bleeding, unexplained bruising, or if you have any other new/concerning symptoms, questions or concerns.     If you are having any concerning symptoms or wish to speak to a provider before your next infusion visit, please call your care coordinator or triage to notify them so we can adequately serve you.     If you need a refill on a prescription or other medication, please call triage before your infusion appointment.       July 2021 Sunday Monday Tuesday Wednesday Thursday Friday Saturday                       1    LAB PERIPHERAL   2:15 PM   (15 min.)   [facility] DRAW   [facility]    ONC INFUSION 1 HR (60 MIN)   3:00 PM   (60 min.)   [facility] INFUSION NURSE   [facility] 2     3       4     5     6     7     8     9    LAB PERIPHERAL   2:00 PM   (15 min.)   [facility]   [facility]    ONC INFUSION 1 HR (60 MIN)   3:00 PM   (60 min.)   [facility] ON[facility] INFUSION NURSE   [facility] 10       11     12     13     14     15     16    LAB PERIPHERAL   2:00 PM   (15 min.)   [facility]   [facility]    ONC INFUSION 1 HR (60 MIN)   2:30 PM   (60 min.)    ONC INFUSION NURSE   [facility] 17       18     19     20    VIDEO VISIT RETURN   3:45 PM   (45 min.)   Alba Moses APRN CNP   [facility] 21     22     23    LAB PERIPHERAL   2:45 PM   (15 min.)   [facility] DRAW   [facility]    ONC INFUSION 1 HR (60 MIN)   3:30 PM   (60 min.)    ONC INFUSION NURSE   [facility]  24       25     26     27     28     29     30    LAB PERIPHERAL   2:45 PM   (15 min.)   [facility] LAB DRAW   [facility]    ONC INFUSION 1 HR (60 MIN)   3:30 PM   (60 min.)    ONC INFUSION NURSE   [facility] 31 August 2021 Sunday Monday Tuesday Wednesday Thursday Friday Saturday   1     2     3     4     5     6     7       8     9     10     11     12     13     14       15     16     17     18     19     20     21       22     23     24     25     26     27     28       29     30     31                                         Lab Results:  Recent Results (from the past 12 hour(s))   Comprehensive metabolic panel (BMP + Alb, Alk Phos, ALT, AST, Total. Bili, TP)    Collection Time: 07/16/21  2:42 PM   Result Value Ref Range    Sodium 139 133 - 144 mmol/L    Potassium 3.6 3.4 - 5.3 mmol/L    Chloride 106 94 - 109 mmol/L    Carbon Dioxide (CO2) 26 20 - 32 mmol/L    Anion Gap 7 3 - 14 mmol/L    Urea Nitrogen 9 7 - 30 mg/dL    Creatinine 0.90 0.66 - 1.25 mg/dL    Calcium 8.4 (L) 8.5 - 10.1 mg/dL    Glucose 116 (H) 70 - 99 mg/dL    Alkaline Phosphatase 63 40 - 150 U/L    AST 16 0 - 45 U/L    ALT 21 0 - 70 U/L    Protein Total 6.0 (L) 6.8 - 8.8 g/dL    Albumin 3.1 (L) 3.4 - 5.0 g/dL    Bilirubin Total 0.4 0.2 - 1.3 mg/dL    GFR Estimate >90 >60 mL/min/1.73m2   CBC with platelets and differential    Collection Time: 07/16/21  2:42 PM   Result Value Ref Range    WBC Count 8.9 4.0 - 11.0 10e3/uL    RBC Count 3.74 (L) 4.40 - 5.90 10e6/uL    Hemoglobin 11.1 (L) 13.3 - 17.7 g/dL    Hematocrit 34.1 (L) 40.0 - 53.0 %    MCV 91 78 - 100 fL    MCH 29.7 26.5 - 33.0 pg    MCHC 32.6 31.5 - 36.5 g/dL    RDW 17.2 (H) 10.0 - 15.0 %    Platelet Count 382 150 - 450 10e3/uL    % Neutrophils 65 %    % Lymphocytes 21 %    % Monocytes 9 %    % Eosinophils 4 %    % Basophils 0 %    % Immature Granulocytes 1 %    NRBCs per 100 WBC 0 <1 /100    Absolute Neutrophils  5.8 1.6 - 8.3 10e3/uL    Absolute Lymphocytes 1.8 0.8 - 5.3 10e3/uL    Absolute Monocytes 0.8 0.0 - 1.3 10e3/uL    Absolute Eosinophils 0.4 0.0 - 0.7 10e3/uL    Absolute Basophils 0.0 0.0 - 0.2 10e3/uL    Absolute Immature Granulocytes 0.0 <=0.0 10e3/uL    Absolute NRBCs 0.0 10e3/uL

## 2021-07-19 ENCOUNTER — TELEPHONE (OUTPATIENT)
Dept: ONCOLOGY | Facility: CLINIC | Age: 51
End: 2021-07-19

## 2021-07-19 DIAGNOSIS — C90.00 MULTIPLE MYELOMA NOT HAVING ACHIEVED REMISSION (H): Primary | ICD-10-CM

## 2021-07-19 RX ORDER — LENALIDOMIDE 25 MG/1
25 CAPSULE ORAL DAILY
Qty: 14 CAPSULE | Refills: 0 | Status: SHIPPED | OUTPATIENT
Start: 2021-07-19 | End: 2021-08-06

## 2021-07-20 ENCOUNTER — VIRTUAL VISIT (OUTPATIENT)
Dept: ONCOLOGY | Facility: CLINIC | Age: 51
End: 2021-07-20
Attending: NURSE PRACTITIONER
Payer: COMMERCIAL

## 2021-07-20 DIAGNOSIS — C90.00 MULTIPLE MYELOMA NOT HAVING ACHIEVED REMISSION (H): Primary | ICD-10-CM

## 2021-07-20 PROCEDURE — 999N001193 HC VIDEO/TELEPHONE VISIT; NO CHARGE

## 2021-07-20 PROCEDURE — 99214 OFFICE O/P EST MOD 30 MIN: CPT | Mod: 95 | Performed by: NURSE PRACTITIONER

## 2021-07-20 NOTE — LETTER
"    7/20/2021         RE: June Ronquillo  [address]  [facility] [address]        Dear Colleague,    Thank you for referring your patient, June Ronquillo, to the [facility]. Please see a copy of my visit note below.    .      Logan is a 51 year old who is being evaluated via a billable video visit.      How would you like to obtain your AVS? MyChart  If the video visit is dropped, the invitation should be resent by: Send to e-mail at: [email or web address]  Will anyone else be joining your video visit? No    Vitals - Patient Reported  Weight (Patient Reported): 73 kg (161 lb)  Height (Patient Reported): 165.1 cm (5' 5\")  BMI (Based on Pt Reported Ht/Wt): 26.79  Pain Score: No Pain (0)  Video visit failed.  Converted to a phone visit. Duration of call 30 minutes      Reason for Visit: seen in f/u of multiple myeloma    Oncology HPI:   - 4/30/2021 M spike of 34.8 in urine.M spike in blood 0.2; IgG 702 with depressed IgA and IgM. Beta 2 microglobulin 2.7. Lambda  with K/L ratio of 0.01. WBC 11.1 with absolute eos of 1.0. hgb, plt, Cr (1.1), and albumin WNL.     -4/30/2021 CT abd/pelvis showed sclerotic marrow changes with numerous lytic appearing lesions throughout the imaged portions of the spine, pelvis and ribs.      -PET scan 5/11/2021 Numerous osteolytic lesions which predominantly demonstrate uptake below liver background levels. There is one intraosseous lesion in the left lateral 9th rib that demonstrates uptake above background, possibly due to nondisplaced fracture. Adjacent to this lesion is mild  hypermetabolism to the left pleura, with new small left pleural effusion, suspicious for malignant effusion versus posttraumatic effusion. Pleural uptake may represent extramedullary myeloma extending along the intercostal space versus inflammation.     - BM bx 5/17/2021 with flow showing 4.0% plasma cells which express CD19 (dim), CD38 (bright), CD45 (dim), , " and monotypic cytoplasmic lambda immunoglobulin light chains but lack CD20 and CD56.  Hypercellular bone marrow for age (approximately 75% cellularity) with   adequate trilineage hematopoiesis. Plasma cell infiltrate: Interstitial, lambda monotypic and representing approximately 60% the bone marrow cellularity, by  immunohistochemical stain.       -initiated Miracle-RVd 21 day with velcade on day 1, 8, 15  on 5/27/21    --Cycle 2 Mriacle-RVd. Dose reduce dex to 80 mg d/t fatigue and stomach upset on dex days. Also having cough with basilar streaking on CXR    Interval history: Has a number of symptoms related to his cancer treatment. Increased fatigue, muscle aching and cramping. Has some stomach upset as well. No vomiting. Has pantoprazole, but doesn't feel that helps much. Is not taking regularly.  Cough has resolved. No further fevers. Shoulder pain is improving. Rib pain has resolved.   Due for C3D15     Current Outpatient Medications   Medication Sig Dispense Refill     acyclovir (ZOVIRAX) 400 MG tablet Take 1 tablet (400 mg) by mouth 2 times daily 60 tablet 6     acyclovir (ZOVIRAX) 400 MG tablet Take 1 tablet (400 mg) by mouth 2 times daily Viral Prophylaxis. 60 tablet 11     aspirin (ASA) 325 MG tablet Take 1 tablet (325 mg) by mouth daily 30 tablet 4     ASPIRIN 81 MG OR TABS 1 TABLET DAILY       dexamethasone (DECADRON) 4 MG tablet Take 20 mg (four tabs) by mouth on Days 2, 9, and 16. 15 tablet 0     dexamethasone (DECADRON) 4 MG tablet Take 20 mg (five tabs) by mouth on Days 2, 9, and 16. 15 tablet 0     dexamethasone (DECADRON) 4 MG tablet Take 20 mg (five tabs) by mouth on Days 2, 9, and 16. 15 tablet 0     FISH OIL 1000 MG OR CAPS 1 bid       hydrochlorothiazide (HYDRODIURIL) 50 MG tablet Take 1 tablet (50 mg) by mouth daily 30 tablet 3     LENalidomide (REVLIMID) 25 MG CAPS capsule Take 1 capsule (25 mg) by mouth daily for 14 days 14 capsule 0     levofloxacin (LEVAQUIN) 250 MG tablet Take 1 tablet  (250 mg) by mouth daily 30 tablet 4     levothyroxine (SYNTHROID/LEVOTHROID) 50 MCG tablet Take 1 tablet (50 mcg) by mouth daily 90 tablet 0     losartan (COZAAR) 50 MG tablet Take 1 tablet (50 mg) by mouth daily 90 tablet 2     order for DME Rib belt (Patient not taking: Reported on 7/9/2021) 1 Device 0     pantoprazole (PROTONIX) 20 MG EC tablet Take 1 tablet (20 mg) by mouth daily (Patient not taking: Reported on 7/9/2021) 30 tablet 4     potassium chloride ER (K-TAB/KLOR-CON) 10 MEQ CR tablet Take 1 tablet by mouth 2 times daily       potassium chloride ER (KLOR-CON M) 10 MEQ CR tablet Take 2 tablets (20 mEq) by mouth daily 30 tablet 3     prochlorperazine (COMPAZINE) 10 MG tablet Take 1 tablet (10 mg) by mouth every 6 hours as needed (Nausea/Vomiting) (Patient not taking: Reported on 7/9/2021) 30 tablet 5     rosuvastatin (CRESTOR) 40 MG tablet TAKE ONE TABLET BY MOUTH ONCE DAILY. MUST MAKE APPOINTMENT FOR ANNUAL FOLLOW UP FOR FURTHER REFILLS 90 tablet 1     vitamin D3 (CHOLECALCIFEROL) 250 mcg (22844 units) capsule Take 1 capsule (250 mcg) by mouth daily 30 capsule 11     vitamin D3 (CHOLECALCIFEROL) 250 mcg (94153 units) capsule Take 1 capsule by mouth daily            Allergies   Allergen Reactions     Nkda [No Known Drug Allergies]          Video physical exam  Video visit failed, so no exam. Voice was clear.    The rest of a comprehensive physical examination is deferred due to PHE (public health emergency) video restrictions     There were no vitals taken for this visit.  Wt Readings from Last 4 Encounters:   07/16/21 73.3 kg (161 lb 8 oz)   07/09/21 76 kg (167 lb 9.6 oz)   07/01/21 74 kg (163 lb 2.3 oz)   06/25/21 74.5 kg (164 lb 4.8 oz)         Labs: Results for DARIUSZ CEDILLO (MRN [id number]) as of 7/22/2021 13:24   Ref. Range 5/27/2021 08:55 6/18/2021 14:44 7/1/2021 14:41 7/9/2021 14:40   Albumin Fraction Latest Ref Range: 3.7 - 5.1 g/dL 4.2 3.7  3.6 (L)   Alpha 1 Fraction Latest Ref Range: 0.2 - 0.4  g/dL 0.3 0.4  0.4   Alpha 2 Fraction Latest Ref Range: 0.5 - 0.9 g/dL 0.7 0.8  0.7   Beta Fraction Latest Ref Range: 0.6 - 1.0 g/dL 0.8 0.8  0.7   ELP Interpretation: Unknown Small monoclonal ... Very small monocl...  Small monoclonal ...   ELP Interpretation Urine Unknown   Only trace albumi...    Gamma Fraction Latest Ref Range: 0.7 - 1.6 g/dL 0.5 (L) 0.4 (L)  0.5 (L)   IGA Latest Ref Range: 84 - 499 mg/dL 37 (L) 41 (L)  26 (L)   IGG Latest Ref Range: 610 - 1,616 mg/dL 581 (L) 452 (L)  531 (L)   IGM Latest Ref Range: 35 - 242 mg/dL 14 (L) 14 (L)  30 (L)   Immunofix ELP Urine Unknown   Test canceled - Lab  error    Immunofixation ELP Unknown (Note) (Note)  (Note)   Kappa Free Lt Chain Latest Ref Range: 0.33 - 1.94 mg/dL 0.89 0.49  0.69   Kappa Lambda Ratio Latest Ref Range: 0.26 - 1.65  0.01 (L) 0.68  0.97   Lambda Free Lt Chain Latest Ref Range: 0.57 - 2.63 mg/dL 132.88 (H) 0.72  0.71   Monoclonal Peak Latest Ref Range: 0.0 g/dL 0.1 (H) 0.1 (H)  0.1 (H)     Results for NGUYENDARIUSZ S (MRN [id number]) as of 7/22/2021 13:24   Ref. Range 7/16/2021 14:42   Sodium Latest Ref Range: 133 - 144 mmol/L 139   Potassium Latest Ref Range: 3.4 - 5.3 mmol/L 3.6   Chloride Latest Ref Range: 94 - 109 mmol/L 106   Carbon Dioxide Latest Ref Range: 20 - 32 mmol/L 26   Urea Nitrogen Latest Ref Range: 7 - 30 mg/dL 9   Creatinine Latest Ref Range: 0.66 - 1.25 mg/dL 0.90   GFR Estimate Latest Ref Range: >60 mL/min/1.73m2 >90   Calcium Latest Ref Range: 8.5 - 10.1 mg/dL 8.4 (L)   Anion Gap Latest Ref Range: 3 - 14 mmol/L 7   Albumin Latest Ref Range: 3.4 - 5.0 g/dL 3.1 (L)   Protein Total Latest Ref Range: 6.8 - 8.8 g/dL 6.0 (L)   Bilirubin Total Latest Ref Range: 0.2 - 1.3 mg/dL 0.4   Alkaline Phosphatase Latest Ref Range: 40 - 150 U/L 63   ALT Latest Ref Range: 0 - 70 U/L 21   AST Latest Ref Range: 0 - 45 U/L 16   Glucose Latest Ref Range: 70 - 99 mg/dL 116 (H)   WBC Latest Ref Range: 4.0 - 11.0 10e3/uL 8.9   Hemoglobin  Latest Ref Range: 13.3 - 17.7 g/dL 11.1 (L)   Hematocrit Latest Ref Range: 40.0 - 53.0 % 34.1 (L)   Platelet Count Latest Ref Range: 150 - 450 10e3/uL 382   RBC Count Latest Ref Range: 4.40 - 5.90 10e6/uL 3.74 (L)   MCV Latest Ref Range: 78 - 100 fL 91   MCH Latest Ref Range: 26.5 - 33.0 pg 29.7   MCHC Latest Ref Range: 31.5 - 36.5 g/dL 32.6   RDW Latest Ref Range: 10.0 - 15.0 % 17.2 (H)   % Neutrophils Latest Units: % 65   % Lymphocytes Latest Units: % 21   % Monocytes Latest Units: % 9   % Eosinophils Latest Units: % 4   Absolute Basophils Latest Ref Range: 0.0 - 0.2 10e3/uL 0.0   % Basophils Latest Units: % 0   Absolute Eosinophils Latest Ref Range: 0.0 - 0.7 10e3/uL 0.4   Absolute Immature Granulocytes Latest Ref Range: <=0.0 10e3/uL 0.0   Absolute Lymphocytes Latest Ref Range: 0.8 - 5.3 10e3/uL 1.8   Absolute Monocytes Latest Ref Range: 0.0 - 1.3 10e3/uL 0.8   % Immature Granulocytes Latest Units: % 1   Absolute Neutrophils Latest Ref Range: 1.6 - 8.3 10e3/uL 5.8   Absolute NRBCs Latest Units: 10e3/uL 0.0   NRBCs per 100 WBC Latest Ref Range: <1 /100 0     Imaging: n/a    Impression/plan:   Multiple myeloma with high risk features  Due for C3, day 15 this week. Having moderate side effects. Reviewed plan with Dr. Vazquez  -will continue with miracle-RVD as planned. With cycle 4, miracle changes to every 3 weeks and velcade continues on day 1 and 8. Oral Dex reduces to 20 mg on day 2 (after Miracle).  -will continue with revlimid  -referral to BMT planned . Will f/u with DOROTA in 1 month and Dr. Vazquez in 2 months      Prophy with Levofloxacin 250 mg every day,  mg every day, and acyclovir 400 mg BID for prophylaxis     Rib fx, L, nondisplaced  -zometa--dental clearance not yet obtained. Discussed why zometa is important to reduce risk of further fracture. He will pursue dental clearance.    Steroid induced gastritis  -advised to try the pantoprazole on the days of dex      Again, thank you for allowing me to  participate in the care of your patient.        Sincerely,        PATTY Rooney CNP

## 2021-07-21 ENCOUNTER — TELEPHONE (OUTPATIENT)
Dept: TRANSPLANT | Facility: CLINIC | Age: 51
End: 2021-07-21

## 2021-07-21 DIAGNOSIS — C90.00 MULTIPLE MYELOMA NOT HAVING ACHIEVED REMISSION (H): Primary | ICD-10-CM

## 2021-07-21 RX ORDER — METHYLPREDNISOLONE SODIUM SUCCINATE 125 MG/2ML
125 INJECTION, POWDER, LYOPHILIZED, FOR SOLUTION INTRAMUSCULAR; INTRAVENOUS
Status: CANCELLED
Start: 2021-07-30

## 2021-07-21 RX ORDER — MEPERIDINE HYDROCHLORIDE 25 MG/ML
25 INJECTION INTRAMUSCULAR; INTRAVENOUS; SUBCUTANEOUS EVERY 30 MIN PRN
Status: CANCELLED | OUTPATIENT
Start: 2021-07-30

## 2021-07-21 RX ORDER — ACETAMINOPHEN 325 MG/1
650 TABLET ORAL ONCE
Status: CANCELLED | OUTPATIENT
Start: 2021-07-30

## 2021-07-21 RX ORDER — LORAZEPAM 2 MG/ML
0.5 INJECTION INTRAMUSCULAR EVERY 4 HOURS PRN
Status: CANCELLED
Start: 2021-08-06

## 2021-07-21 RX ORDER — DIPHENHYDRAMINE HYDROCHLORIDE 50 MG/ML
50 INJECTION INTRAMUSCULAR; INTRAVENOUS
Status: CANCELLED
Start: 2021-07-30

## 2021-07-21 RX ORDER — DEXAMETHASONE 4 MG/1
20 TABLET ORAL ONCE
Status: CANCELLED | OUTPATIENT
Start: 2021-07-30

## 2021-07-21 RX ORDER — ALBUTEROL SULFATE 0.83 MG/ML
2.5 SOLUTION RESPIRATORY (INHALATION)
Status: CANCELLED | OUTPATIENT
Start: 2021-07-30

## 2021-07-21 RX ORDER — NALOXONE HYDROCHLORIDE 0.4 MG/ML
.1-.4 INJECTION, SOLUTION INTRAMUSCULAR; INTRAVENOUS; SUBCUTANEOUS
Status: CANCELLED | OUTPATIENT
Start: 2021-08-06

## 2021-07-21 RX ORDER — LORAZEPAM 2 MG/ML
0.5 INJECTION INTRAMUSCULAR EVERY 4 HOURS PRN
Status: CANCELLED
Start: 2021-07-30

## 2021-07-21 RX ORDER — HEPARIN SODIUM,PORCINE 10 UNIT/ML
5 VIAL (ML) INTRAVENOUS
Status: CANCELLED | OUTPATIENT
Start: 2021-08-06

## 2021-07-21 RX ORDER — EPINEPHRINE 1 MG/ML
0.3 INJECTION, SOLUTION INTRAMUSCULAR; SUBCUTANEOUS EVERY 5 MIN PRN
Status: CANCELLED | OUTPATIENT
Start: 2021-08-06

## 2021-07-21 RX ORDER — METHYLPREDNISOLONE SODIUM SUCCINATE 125 MG/2ML
125 INJECTION, POWDER, LYOPHILIZED, FOR SOLUTION INTRAMUSCULAR; INTRAVENOUS
Status: CANCELLED
Start: 2021-08-06

## 2021-07-21 RX ORDER — MEPERIDINE HYDROCHLORIDE 25 MG/ML
25 INJECTION INTRAMUSCULAR; INTRAVENOUS; SUBCUTANEOUS EVERY 30 MIN PRN
Status: CANCELLED | OUTPATIENT
Start: 2021-08-06

## 2021-07-21 RX ORDER — HEPARIN SODIUM,PORCINE 10 UNIT/ML
5 VIAL (ML) INTRAVENOUS
Status: CANCELLED | OUTPATIENT
Start: 2021-07-30

## 2021-07-21 RX ORDER — SODIUM CHLORIDE 9 MG/ML
1000 INJECTION, SOLUTION INTRAVENOUS CONTINUOUS PRN
Status: CANCELLED
Start: 2021-08-06

## 2021-07-21 RX ORDER — EPINEPHRINE 1 MG/ML
0.3 INJECTION, SOLUTION INTRAMUSCULAR; SUBCUTANEOUS EVERY 5 MIN PRN
Status: CANCELLED | OUTPATIENT
Start: 2021-07-30

## 2021-07-21 RX ORDER — SODIUM CHLORIDE 9 MG/ML
1000 INJECTION, SOLUTION INTRAVENOUS CONTINUOUS PRN
Status: CANCELLED
Start: 2021-07-30

## 2021-07-21 RX ORDER — HEPARIN SODIUM (PORCINE) LOCK FLUSH IV SOLN 100 UNIT/ML 100 UNIT/ML
5 SOLUTION INTRAVENOUS
Status: CANCELLED | OUTPATIENT
Start: 2021-07-30

## 2021-07-21 RX ORDER — ALBUTEROL SULFATE 90 UG/1
1-2 AEROSOL, METERED RESPIRATORY (INHALATION)
Status: CANCELLED
Start: 2021-07-30

## 2021-07-21 RX ORDER — NALOXONE HYDROCHLORIDE 0.4 MG/ML
.1-.4 INJECTION, SOLUTION INTRAMUSCULAR; INTRAVENOUS; SUBCUTANEOUS
Status: CANCELLED | OUTPATIENT
Start: 2021-07-30

## 2021-07-21 RX ORDER — ALBUTEROL SULFATE 90 UG/1
1-2 AEROSOL, METERED RESPIRATORY (INHALATION)
Status: CANCELLED
Start: 2021-08-06

## 2021-07-21 RX ORDER — HEPARIN SODIUM (PORCINE) LOCK FLUSH IV SOLN 100 UNIT/ML 100 UNIT/ML
5 SOLUTION INTRAVENOUS
Status: CANCELLED | OUTPATIENT
Start: 2021-08-06

## 2021-07-21 RX ORDER — DIPHENHYDRAMINE HYDROCHLORIDE 50 MG/ML
50 INJECTION INTRAMUSCULAR; INTRAVENOUS
Status: CANCELLED
Start: 2021-08-06

## 2021-07-21 RX ORDER — ALBUTEROL SULFATE 0.83 MG/ML
2.5 SOLUTION RESPIRATORY (INHALATION)
Status: CANCELLED | OUTPATIENT
Start: 2021-08-06

## 2021-07-21 RX ORDER — DIPHENHYDRAMINE HCL 25 MG
50 CAPSULE ORAL ONCE
Status: CANCELLED | OUTPATIENT
Start: 2021-07-30

## 2021-07-22 NOTE — PROGRESS NOTES
"Logan is a 51 year old who is being evaluated via a billable video visit.      How would you like to obtain your AVS? MyChart  If the video visit is dropped, the invitation should be resent by: Send to e-mail at: [email or web address]  Will anyone else be joining your video visit? No    Vitals - Patient Reported  Weight (Patient Reported): 73 kg (161 lb)  Height (Patient Reported): 165.1 cm (5' 5\")  BMI (Based on Pt Reported Ht/Wt): 26.79  Pain Score: No Pain (0)  Video visit failed.  Converted to a phone visit. Duration of call 30 minutes      Reason for Visit: seen in f/u of multiple myeloma    Oncology HPI:   - 4/30/2021 M spike of 34.8 in urine.M spike in blood 0.2; IgG 702 with depressed IgA and IgM. Beta 2 microglobulin 2.7. Lambda  with K/L ratio of 0.01. WBC 11.1 with absolute eos of 1.0. hgb, plt, Cr (1.1), and albumin WNL.     -4/30/2021 CT abd/pelvis showed sclerotic marrow changes with numerous lytic appearing lesions throughout the imaged portions of the spine, pelvis and ribs.      -PET scan 5/11/2021 Numerous osteolytic lesions which predominantly demonstrate uptake below liver background levels. There is one intraosseous lesion in the left lateral 9th rib that demonstrates uptake above background, possibly due to nondisplaced fracture. Adjacent to this lesion is mild  hypermetabolism to the left pleura, with new small left pleural effusion, suspicious for malignant effusion versus posttraumatic effusion. Pleural uptake may represent extramedullary myeloma extending along the intercostal space versus inflammation.     - BM bx 5/17/2021 with flow showing 4.0% plasma cells which express CD19 (dim), CD38 (bright), CD45 (dim), , and monotypic cytoplasmic lambda immunoglobulin light chains but lack CD20 and CD56.  Hypercellular bone marrow for age (approximately 75% cellularity) with   adequate trilineage hematopoiesis. Plasma cell infiltrate: Interstitial, lambda monotypic and representing " approximately 60% the bone marrow cellularity, by  immunohistochemical stain.       -initiated Miracle-RVd 21 day with velcade on day 1, 8, 15  on 5/27/21    --Cycle 2 Miracle-RVd. Dose reduce dex to 80 mg d/t fatigue and stomach upset on dex days. Also having cough with basilar streaking on CXR    Interval history: Has a number of symptoms related to his cancer treatment. Increased fatigue, muscle aching and cramping. Has some stomach upset as well. No vomiting. Has pantoprazole, but doesn't feel that helps much. Is not taking regularly.  Cough has resolved. No further fevers. Shoulder pain is improving. Rib pain has resolved.   Due for C3D15     Current Outpatient Medications   Medication Sig Dispense Refill     acyclovir (ZOVIRAX) 400 MG tablet Take 1 tablet (400 mg) by mouth 2 times daily 60 tablet 6     acyclovir (ZOVIRAX) 400 MG tablet Take 1 tablet (400 mg) by mouth 2 times daily Viral Prophylaxis. 60 tablet 11     aspirin (ASA) 325 MG tablet Take 1 tablet (325 mg) by mouth daily 30 tablet 4     ASPIRIN 81 MG OR TABS 1 TABLET DAILY       dexamethasone (DECADRON) 4 MG tablet Take 20 mg (four tabs) by mouth on Days 2, 9, and 16. 15 tablet 0     dexamethasone (DECADRON) 4 MG tablet Take 20 mg (five tabs) by mouth on Days 2, 9, and 16. 15 tablet 0     dexamethasone (DECADRON) 4 MG tablet Take 20 mg (five tabs) by mouth on Days 2, 9, and 16. 15 tablet 0     FISH OIL 1000 MG OR CAPS 1 bid       hydrochlorothiazide (HYDRODIURIL) 50 MG tablet Take 1 tablet (50 mg) by mouth daily 30 tablet 3     LENalidomide (REVLIMID) 25 MG CAPS capsule Take 1 capsule (25 mg) by mouth daily for 14 days 14 capsule 0     levofloxacin (LEVAQUIN) 250 MG tablet Take 1 tablet (250 mg) by mouth daily 30 tablet 4     levothyroxine (SYNTHROID/LEVOTHROID) 50 MCG tablet Take 1 tablet (50 mcg) by mouth daily 90 tablet 0     losartan (COZAAR) 50 MG tablet Take 1 tablet (50 mg) by mouth daily 90 tablet 2     order for DME Rib belt (Patient not  taking: Reported on 7/9/2021) 1 Device 0     pantoprazole (PROTONIX) 20 MG EC tablet Take 1 tablet (20 mg) by mouth daily (Patient not taking: Reported on 7/9/2021) 30 tablet 4     potassium chloride ER (K-TAB/KLOR-CON) 10 MEQ CR tablet Take 1 tablet by mouth 2 times daily       potassium chloride ER (KLOR-CON M) 10 MEQ CR tablet Take 2 tablets (20 mEq) by mouth daily 30 tablet 3     prochlorperazine (COMPAZINE) 10 MG tablet Take 1 tablet (10 mg) by mouth every 6 hours as needed (Nausea/Vomiting) (Patient not taking: Reported on 7/9/2021) 30 tablet 5     rosuvastatin (CRESTOR) 40 MG tablet TAKE ONE TABLET BY MOUTH ONCE DAILY. MUST MAKE APPOINTMENT FOR ANNUAL FOLLOW UP FOR FURTHER REFILLS 90 tablet 1     vitamin D3 (CHOLECALCIFEROL) 250 mcg (04218 units) capsule Take 1 capsule (250 mcg) by mouth daily 30 capsule 11     vitamin D3 (CHOLECALCIFEROL) 250 mcg (30424 units) capsule Take 1 capsule by mouth daily            Allergies   Allergen Reactions     Nkda [No Known Drug Allergies]          Video physical exam  Video visit failed, so no exam. Voice was clear.    The rest of a comprehensive physical examination is deferred due to PHE (public health emergency) video restrictions     There were no vitals taken for this visit.  Wt Readings from Last 4 Encounters:   07/16/21 73.3 kg (161 lb 8 oz)   07/09/21 76 kg (167 lb 9.6 oz)   07/01/21 74 kg (163 lb 2.3 oz)   06/25/21 74.5 kg (164 lb 4.8 oz)         Labs: Results for DARIUSZ CEDILLO (MRN [id number]) as of 7/22/2021 13:24   Ref. Range 5/27/2021 08:55 6/18/2021 14:44 7/1/2021 14:41 7/9/2021 14:40   Albumin Fraction Latest Ref Range: 3.7 - 5.1 g/dL 4.2 3.7  3.6 (L)   Alpha 1 Fraction Latest Ref Range: 0.2 - 0.4 g/dL 0.3 0.4  0.4   Alpha 2 Fraction Latest Ref Range: 0.5 - 0.9 g/dL 0.7 0.8  0.7   Beta Fraction Latest Ref Range: 0.6 - 1.0 g/dL 0.8 0.8  0.7   ELP Interpretation: Unknown Small monoclonal ... Very small monocl...  Small monoclonal ...   ELP Interpretation Urine  Unknown   Only trace albumi...    Gamma Fraction Latest Ref Range: 0.7 - 1.6 g/dL 0.5 (L) 0.4 (L)  0.5 (L)   IGA Latest Ref Range: 84 - 499 mg/dL 37 (L) 41 (L)  26 (L)   IGG Latest Ref Range: 610 - 1,616 mg/dL 581 (L) 452 (L)  531 (L)   IGM Latest Ref Range: 35 - 242 mg/dL 14 (L) 14 (L)  30 (L)   Immunofix ELP Urine Unknown   Test canceled - Lab  error    Immunofixation ELP Unknown (Note) (Note)  (Note)   Kappa Free Lt Chain Latest Ref Range: 0.33 - 1.94 mg/dL 0.89 0.49  0.69   Kappa Lambda Ratio Latest Ref Range: 0.26 - 1.65  0.01 (L) 0.68  0.97   Lambda Free Lt Chain Latest Ref Range: 0.57 - 2.63 mg/dL 132.88 (H) 0.72  0.71   Monoclonal Peak Latest Ref Range: 0.0 g/dL 0.1 (H) 0.1 (H)  0.1 (H)     Results for DARIUSZ CEDILLO (MRN [id number]) as of 7/22/2021 13:24   Ref. Range 7/16/2021 14:42   Sodium Latest Ref Range: 133 - 144 mmol/L 139   Potassium Latest Ref Range: 3.4 - 5.3 mmol/L 3.6   Chloride Latest Ref Range: 94 - 109 mmol/L 106   Carbon Dioxide Latest Ref Range: 20 - 32 mmol/L 26   Urea Nitrogen Latest Ref Range: 7 - 30 mg/dL 9   Creatinine Latest Ref Range: 0.66 - 1.25 mg/dL 0.90   GFR Estimate Latest Ref Range: >60 mL/min/1.73m2 >90   Calcium Latest Ref Range: 8.5 - 10.1 mg/dL 8.4 (L)   Anion Gap Latest Ref Range: 3 - 14 mmol/L 7   Albumin Latest Ref Range: 3.4 - 5.0 g/dL 3.1 (L)   Protein Total Latest Ref Range: 6.8 - 8.8 g/dL 6.0 (L)   Bilirubin Total Latest Ref Range: 0.2 - 1.3 mg/dL 0.4   Alkaline Phosphatase Latest Ref Range: 40 - 150 U/L 63   ALT Latest Ref Range: 0 - 70 U/L 21   AST Latest Ref Range: 0 - 45 U/L 16   Glucose Latest Ref Range: 70 - 99 mg/dL 116 (H)   WBC Latest Ref Range: 4.0 - 11.0 10e3/uL 8.9   Hemoglobin Latest Ref Range: 13.3 - 17.7 g/dL 11.1 (L)   Hematocrit Latest Ref Range: 40.0 - 53.0 % 34.1 (L)   Platelet Count Latest Ref Range: 150 - 450 10e3/uL 382   RBC Count Latest Ref Range: 4.40 - 5.90 10e6/uL 3.74 (L)   MCV Latest Ref Range: 78 - 100 fL 91   MCH Latest Ref  Range: 26.5 - 33.0 pg 29.7   MCHC Latest Ref Range: 31.5 - 36.5 g/dL 32.6   RDW Latest Ref Range: 10.0 - 15.0 % 17.2 (H)   % Neutrophils Latest Units: % 65   % Lymphocytes Latest Units: % 21   % Monocytes Latest Units: % 9   % Eosinophils Latest Units: % 4   Absolute Basophils Latest Ref Range: 0.0 - 0.2 10e3/uL 0.0   % Basophils Latest Units: % 0   Absolute Eosinophils Latest Ref Range: 0.0 - 0.7 10e3/uL 0.4   Absolute Immature Granulocytes Latest Ref Range: <=0.0 10e3/uL 0.0   Absolute Lymphocytes Latest Ref Range: 0.8 - 5.3 10e3/uL 1.8   Absolute Monocytes Latest Ref Range: 0.0 - 1.3 10e3/uL 0.8   % Immature Granulocytes Latest Units: % 1   Absolute Neutrophils Latest Ref Range: 1.6 - 8.3 10e3/uL 5.8   Absolute NRBCs Latest Units: 10e3/uL 0.0   NRBCs per 100 WBC Latest Ref Range: <1 /100 0     Imaging: n/a    Impression/plan:   Multiple myeloma with high risk features  Due for C3, day 15 this week. Having moderate side effects. Reviewed plan with Dr. Vazquez  -will continue with miracle-RVD as planned. With cycle 4, miracle changes to every 3 weeks and velcade continues on day 1 and 8. Oral Dex reduces to 20 mg on day 2 (after Miracle).  -will continue with revlimid  -referral to BMT planned . Will f/u with DOROTA in 1 month and Dr. Vazquez in 2 months      Prophy with Levofloxacin 250 mg every day,  mg every day, and acyclovir 400 mg BID for prophylaxis     Rib fx, L, nondisplaced  -zometa--dental clearance not yet obtained. Discussed why zometa is important to reduce risk of further fracture. He will pursue dental clearance.    Steroid induced gastritis  -advised to try the pantoprazole on the days of dex

## 2021-07-23 ENCOUNTER — APPOINTMENT (OUTPATIENT)
Dept: LAB | Facility: CLINIC | Age: 51
End: 2021-07-23
Attending: STUDENT IN AN ORGANIZED HEALTH CARE EDUCATION/TRAINING PROGRAM
Payer: COMMERCIAL

## 2021-07-23 ENCOUNTER — INFUSION THERAPY VISIT (OUTPATIENT)
Dept: ONCOLOGY | Facility: CLINIC | Age: 51
End: 2021-07-23
Attending: STUDENT IN AN ORGANIZED HEALTH CARE EDUCATION/TRAINING PROGRAM
Payer: COMMERCIAL

## 2021-07-23 VITALS
SYSTOLIC BLOOD PRESSURE: 123 MMHG | DIASTOLIC BLOOD PRESSURE: 65 MMHG | HEART RATE: 87 BPM | RESPIRATION RATE: 16 BRPM | OXYGEN SATURATION: 98 % | TEMPERATURE: 98.7 F

## 2021-07-23 DIAGNOSIS — C90.00 MULTIPLE MYELOMA NOT HAVING ACHIEVED REMISSION (H): Primary | ICD-10-CM

## 2021-07-23 LAB
ALBUMIN SERPL-MCNC: 3.2 G/DL (ref 3.4–5)
ALP SERPL-CCNC: 60 U/L (ref 40–150)
ALT SERPL W P-5'-P-CCNC: 24 U/L (ref 0–70)
ANION GAP SERPL CALCULATED.3IONS-SCNC: 5 MMOL/L (ref 3–14)
AST SERPL W P-5'-P-CCNC: 18 U/L (ref 0–45)
BASOPHILS # BLD AUTO: 0 10E3/UL (ref 0–0.2)
BASOPHILS NFR BLD AUTO: 0 %
BILIRUB SERPL-MCNC: 0.4 MG/DL (ref 0.2–1.3)
BUN SERPL-MCNC: 8 MG/DL (ref 7–30)
CALCIUM SERPL-MCNC: 8.6 MG/DL (ref 8.5–10.1)
CHLORIDE BLD-SCNC: 108 MMOL/L (ref 94–109)
CO2 SERPL-SCNC: 26 MMOL/L (ref 20–32)
CREAT SERPL-MCNC: 0.98 MG/DL (ref 0.66–1.25)
EOSINOPHIL # BLD AUTO: 0.4 10E3/UL (ref 0–0.7)
EOSINOPHIL NFR BLD AUTO: 4 %
ERYTHROCYTE [DISTWIDTH] IN BLOOD BY AUTOMATED COUNT: 17.2 % (ref 10–15)
GFR SERPL CREATININE-BSD FRML MDRD: 88 ML/MIN/1.73M2
GLUCOSE BLD-MCNC: 94 MG/DL (ref 70–99)
HCT VFR BLD AUTO: 31.7 % (ref 40–53)
HGB BLD-MCNC: 10.7 G/DL (ref 13.3–17.7)
IMM GRANULOCYTES # BLD: 0 10E3/UL
IMM GRANULOCYTES NFR BLD: 0 %
LYMPHOCYTES # BLD AUTO: 2.1 10E3/UL (ref 0.8–5.3)
LYMPHOCYTES NFR BLD AUTO: 20 %
MCH RBC QN AUTO: 30.2 PG (ref 26.5–33)
MCHC RBC AUTO-ENTMCNC: 33.8 G/DL (ref 31.5–36.5)
MCV RBC AUTO: 90 FL (ref 78–100)
MONOCYTES # BLD AUTO: 1.5 10E3/UL (ref 0–1.3)
MONOCYTES NFR BLD AUTO: 15 %
NEUTROPHILS # BLD AUTO: 6.5 10E3/UL (ref 1.6–8.3)
NEUTROPHILS NFR BLD AUTO: 61 %
NRBC # BLD AUTO: 0 10E3/UL
NRBC BLD AUTO-RTO: 0 /100
PLATELET # BLD AUTO: 356 10E3/UL (ref 150–450)
POTASSIUM BLD-SCNC: 3.5 MMOL/L (ref 3.4–5.3)
PROT SERPL-MCNC: 6 G/DL (ref 6.8–8.8)
RBC # BLD AUTO: 3.54 10E6/UL (ref 4.4–5.9)
SODIUM SERPL-SCNC: 139 MMOL/L (ref 133–144)
TOTAL PROTEIN SERUM FOR ELP: 5.7 G/DL (ref 6.8–8.8)
WBC # BLD AUTO: 10.5 10E3/UL (ref 4–11)

## 2021-07-23 PROCEDURE — 96401 CHEMO ANTI-NEOPL SQ/IM: CPT

## 2021-07-23 PROCEDURE — 36415 COLL VENOUS BLD VENIPUNCTURE: CPT

## 2021-07-23 PROCEDURE — 85025 COMPLETE CBC W/AUTO DIFF WBC: CPT | Performed by: STUDENT IN AN ORGANIZED HEALTH CARE EDUCATION/TRAINING PROGRAM

## 2021-07-23 PROCEDURE — 84165 PROTEIN E-PHORESIS SERUM: CPT | Mod: 26 | Performed by: PATHOLOGY

## 2021-07-23 PROCEDURE — 250N000013 HC RX MED GY IP 250 OP 250 PS 637: Performed by: STUDENT IN AN ORGANIZED HEALTH CARE EDUCATION/TRAINING PROGRAM

## 2021-07-23 PROCEDURE — 82247 BILIRUBIN TOTAL: CPT

## 2021-07-23 PROCEDURE — 250N000012 HC RX MED GY IP 250 OP 636 PS 637: Performed by: STUDENT IN AN ORGANIZED HEALTH CARE EDUCATION/TRAINING PROGRAM

## 2021-07-23 PROCEDURE — 84165 PROTEIN E-PHORESIS SERUM: CPT | Mod: TC | Performed by: PATHOLOGY

## 2021-07-23 PROCEDURE — 84155 ASSAY OF PROTEIN SERUM: CPT

## 2021-07-23 PROCEDURE — 82040 ASSAY OF SERUM ALBUMIN: CPT

## 2021-07-23 PROCEDURE — 99207 PR NO BILLABLE SERVICE THIS VISIT: CPT

## 2021-07-23 PROCEDURE — 250N000011 HC RX IP 250 OP 636: Performed by: STUDENT IN AN ORGANIZED HEALTH CARE EDUCATION/TRAINING PROGRAM

## 2021-07-23 RX ORDER — DEXAMETHASONE 4 MG/1
20 TABLET ORAL ONCE
Status: COMPLETED | OUTPATIENT
Start: 2021-07-23 | End: 2021-07-23

## 2021-07-23 RX ORDER — DIPHENHYDRAMINE HCL 25 MG
50 CAPSULE ORAL ONCE
Status: COMPLETED | OUTPATIENT
Start: 2021-07-23 | End: 2021-07-23

## 2021-07-23 RX ORDER — ACETAMINOPHEN 325 MG/1
650 TABLET ORAL ONCE
Status: COMPLETED | OUTPATIENT
Start: 2021-07-23 | End: 2021-07-23

## 2021-07-23 RX ADMIN — DEXAMETHASONE 20 MG: 4 TABLET ORAL at 15:41

## 2021-07-23 RX ADMIN — BORTEZOMIB 2.5 MG: 3.5 INJECTION, POWDER, LYOPHILIZED, FOR SOLUTION INTRAVENOUS; SUBCUTANEOUS at 16:16

## 2021-07-23 RX ADMIN — DARATUMUMAB AND HYALURONIDASE-FIHJ (HUMAN RECOMBINANT) 1800 MG: 1800; 30000 INJECTION SUBCUTANEOUS at 16:42

## 2021-07-23 RX ADMIN — DIPHENHYDRAMINE HYDROCHLORIDE 50 MG: 25 CAPSULE ORAL at 15:41

## 2021-07-23 RX ADMIN — ACETAMINOPHEN 650 MG: 325 TABLET ORAL at 15:41

## 2021-07-23 ASSESSMENT — PAIN SCALES - GENERAL: PAINLEVEL: NO PAIN (0)

## 2021-07-23 NOTE — PROGRESS NOTES
Infusion Nursing Note:  June Ronquillo presents today for C6D15 Velcade/Darzalex Faspro subcutaenous.    Patient seen by provider today: No   present during visit today: Not Applicable.    Note: Patient reports feeling well. Denies nausea/vomiting nor chest and abdominal discomfort. No new concerns made. Otherwise well.    IB sent , as patient request for appointments on August 6 and 13. Instructed patient if unable to see next few days to call . Verbalized understanding.     Intravenous Access:  No Intravenous access/labs at this visit.    Treatment Conditions:  Lab Results   Component Value Date    HGB 10.7 07/23/2021    HGB 10.9 07/09/2021     Lab Results   Component Value Date    WBC 10.5 07/23/2021    WBC 9.6 07/09/2021      Lab Results   Component Value Date    ANEU 4.7 07/09/2021     Lab Results   Component Value Date     07/23/2021     07/09/2021      Lab Results   Component Value Date     07/23/2021     07/09/2021                   Lab Results   Component Value Date    POTASSIUM 3.5 07/23/2021    POTASSIUM 3.7 07/09/2021           No results found for: MAG         Lab Results   Component Value Date    CR 0.98 07/23/2021    CR 0.87 07/09/2021                   Lab Results   Component Value Date    HARDEEP 8.6 07/23/2021    HARDEEP 8.4 07/09/2021                Lab Results   Component Value Date    BILITOTAL 0.4 07/23/2021    BILITOTAL 0.4 07/09/2021           Lab Results   Component Value Date    ALBUMIN 3.2 07/23/2021    ALBUMIN 3.2 07/09/2021                    Lab Results   Component Value Date    ALT 24 07/23/2021    ALT 25 07/09/2021           Lab Results   Component Value Date    AST 18 07/23/2021    AST 23 07/09/2021       Results reviewed, labs MET treatment parameters, ok to proceed with treatment.      Post Infusion Assessment:  Patient tolerated one Velcade injection on right upper arm without incident.  Patient tolerated one Velcade injection  on right lower abdomen without incident.  Site patent and intact, free from redness, edema or discomfort.  No evidence of extravasations.       Discharge Plan:   Patient declined prescription refills. Patient has enough Dexamethasone.   Discharge instructions reviewed with: Patient.  Patient and/or family verbalized understanding of discharge instructions and all questions answered.  AVS to patient via [employer].  Patient will return 7/30/21 for next appointment.   Patient discharged in stable condition accompanied by: self.  Departure Mode: Ambulatory.      WILIAM PELAYO RN

## 2021-07-23 NOTE — NURSING NOTE
Chief Complaint   Patient presents with     Blood Draw     Labs drawn via  by RN in lab. VS taken.      Michael Marin RN

## 2021-07-24 ENCOUNTER — DOCUMENTATION ONLY (OUTPATIENT)
Dept: ONCOLOGY | Facility: CLINIC | Age: 51
End: 2021-07-24

## 2021-07-26 DIAGNOSIS — C90.00 MULTIPLE MYELOMA NOT HAVING ACHIEVED REMISSION (H): ICD-10-CM

## 2021-07-26 LAB
ALBUMIN SERPL ELPH-MCNC: 3.6 G/DL (ref 3.7–5.1)
ALPHA1 GLOB SERPL ELPH-MCNC: 0.3 G/DL (ref 0.2–0.4)
ALPHA2 GLOB SERPL ELPH-MCNC: 0.6 G/DL (ref 0.5–0.9)
B-GLOBULIN SERPL ELPH-MCNC: 0.7 G/DL (ref 0.6–1)
GAMMA GLOB SERPL ELPH-MCNC: 0.4 G/DL (ref 0.7–1.6)
M PROTEIN SERPL ELPH-MCNC: 0.1 G/DL
PROT PATTERN SERPL ELPH-IMP: ABNORMAL

## 2021-07-26 RX ORDER — POTASSIUM CHLORIDE 750 MG/1
20 TABLET, EXTENDED RELEASE ORAL DAILY
Qty: 30 TABLET | Refills: 3 | Status: SHIPPED | OUTPATIENT
Start: 2021-07-26 | End: 2021-10-12

## 2021-07-26 NOTE — TELEPHONE ENCOUNTER
Oral Chemotherapy Monitoring Program   Medication: Revlimid  Rx: 25mg PO daily on days 1 through 14 of 21 day cycle   Auth #: ?[id number]   Risk Category: Adult Male  Routine survey questions reviewed.   Rx to be Escribed to [facility]  Oncology Pharmacy Liaison   [email or web address]  [phone] (phone  [phone] (fax

## 2021-07-30 ENCOUNTER — APPOINTMENT (OUTPATIENT)
Dept: LAB | Facility: CLINIC | Age: 51
End: 2021-07-30
Attending: STUDENT IN AN ORGANIZED HEALTH CARE EDUCATION/TRAINING PROGRAM
Payer: COMMERCIAL

## 2021-07-30 ENCOUNTER — INFUSION THERAPY VISIT (OUTPATIENT)
Dept: ONCOLOGY | Facility: CLINIC | Age: 51
End: 2021-07-30
Attending: STUDENT IN AN ORGANIZED HEALTH CARE EDUCATION/TRAINING PROGRAM
Payer: COMMERCIAL

## 2021-07-30 VITALS
SYSTOLIC BLOOD PRESSURE: 99 MMHG | BODY MASS INDEX: 27.02 KG/M2 | WEIGHT: 162.4 LBS | TEMPERATURE: 98 F | RESPIRATION RATE: 16 BRPM | OXYGEN SATURATION: 99 % | HEART RATE: 90 BPM | DIASTOLIC BLOOD PRESSURE: 66 MMHG

## 2021-07-30 DIAGNOSIS — C90.00 MULTIPLE MYELOMA NOT HAVING ACHIEVED REMISSION (H): Primary | ICD-10-CM

## 2021-07-30 LAB
ALBUMIN SERPL-MCNC: 3.3 G/DL (ref 3.4–5)
ALP SERPL-CCNC: 57 U/L (ref 40–150)
ALT SERPL W P-5'-P-CCNC: 23 U/L (ref 0–70)
ANION GAP SERPL CALCULATED.3IONS-SCNC: 8 MMOL/L (ref 3–14)
AST SERPL W P-5'-P-CCNC: 21 U/L (ref 0–45)
BASOPHILS # BLD AUTO: 0 10E3/UL (ref 0–0.2)
BASOPHILS NFR BLD AUTO: 0 %
BILIRUB SERPL-MCNC: 0.5 MG/DL (ref 0.2–1.3)
BUN SERPL-MCNC: 12 MG/DL (ref 7–30)
CALCIUM SERPL-MCNC: 8.5 MG/DL (ref 8.5–10.1)
CHLORIDE BLD-SCNC: 106 MMOL/L (ref 94–109)
CO2 SERPL-SCNC: 25 MMOL/L (ref 20–32)
CREAT SERPL-MCNC: 1.07 MG/DL (ref 0.66–1.25)
EOSINOPHIL # BLD AUTO: 0.6 10E3/UL (ref 0–0.7)
EOSINOPHIL NFR BLD AUTO: 6 %
ERYTHROCYTE [DISTWIDTH] IN BLOOD BY AUTOMATED COUNT: 18.3 % (ref 10–15)
GFR SERPL CREATININE-BSD FRML MDRD: 79 ML/MIN/1.73M2
GLUCOSE BLD-MCNC: 94 MG/DL (ref 70–99)
HCT VFR BLD AUTO: 31.7 % (ref 40–53)
HGB BLD-MCNC: 10.5 G/DL (ref 13.3–17.7)
HOLD SPECIMEN: NORMAL
IMM GRANULOCYTES # BLD: 0.1 10E3/UL
IMM GRANULOCYTES NFR BLD: 1 %
LYMPHOCYTES # BLD AUTO: 1.9 10E3/UL (ref 0.8–5.3)
LYMPHOCYTES NFR BLD AUTO: 18 %
MCH RBC QN AUTO: 30.9 PG (ref 26.5–33)
MCHC RBC AUTO-ENTMCNC: 33.1 G/DL (ref 31.5–36.5)
MCV RBC AUTO: 93 FL (ref 78–100)
MONOCYTES # BLD AUTO: 1.7 10E3/UL (ref 0–1.3)
MONOCYTES NFR BLD AUTO: 16 %
NEUTROPHILS # BLD AUTO: 6.3 10E3/UL (ref 1.6–8.3)
NEUTROPHILS NFR BLD AUTO: 59 %
NRBC # BLD AUTO: 0 10E3/UL
NRBC BLD AUTO-RTO: 0 /100
PLATELET # BLD AUTO: 335 10E3/UL (ref 150–450)
POTASSIUM BLD-SCNC: 3.8 MMOL/L (ref 3.4–5.3)
PROT SERPL-MCNC: 6 G/DL (ref 6.8–8.8)
RBC # BLD AUTO: 3.4 10E6/UL (ref 4.4–5.9)
SODIUM SERPL-SCNC: 139 MMOL/L (ref 133–144)
WBC # BLD AUTO: 10.6 10E3/UL (ref 4–11)

## 2021-07-30 PROCEDURE — 83883 ASSAY NEPHELOMETRY NOT SPEC: CPT

## 2021-07-30 PROCEDURE — 96401 CHEMO ANTI-NEOPL SQ/IM: CPT

## 2021-07-30 PROCEDURE — 36415 COLL VENOUS BLD VENIPUNCTURE: CPT

## 2021-07-30 PROCEDURE — 80053 COMPREHEN METABOLIC PANEL: CPT

## 2021-07-30 PROCEDURE — 250N000011 HC RX IP 250 OP 636: Performed by: STUDENT IN AN ORGANIZED HEALTH CARE EDUCATION/TRAINING PROGRAM

## 2021-07-30 PROCEDURE — 250N000012 HC RX MED GY IP 250 OP 636 PS 637: Performed by: STUDENT IN AN ORGANIZED HEALTH CARE EDUCATION/TRAINING PROGRAM

## 2021-07-30 PROCEDURE — 82784 ASSAY IGA/IGD/IGG/IGM EACH: CPT

## 2021-07-30 PROCEDURE — 85025 COMPLETE CBC W/AUTO DIFF WBC: CPT

## 2021-07-30 PROCEDURE — 36415 COLL VENOUS BLD VENIPUNCTURE: CPT | Performed by: STUDENT IN AN ORGANIZED HEALTH CARE EDUCATION/TRAINING PROGRAM

## 2021-07-30 PROCEDURE — 86334 IMMUNOFIX E-PHORESIS SERUM: CPT | Mod: 26 | Performed by: PATHOLOGY

## 2021-07-30 PROCEDURE — 250N000013 HC RX MED GY IP 250 OP 250 PS 637: Performed by: STUDENT IN AN ORGANIZED HEALTH CARE EDUCATION/TRAINING PROGRAM

## 2021-07-30 RX ORDER — ACETAMINOPHEN 325 MG/1
650 TABLET ORAL ONCE
Status: COMPLETED | OUTPATIENT
Start: 2021-07-30 | End: 2021-07-30

## 2021-07-30 RX ORDER — DEXAMETHASONE 4 MG/1
TABLET ORAL
Qty: 15 TABLET | Refills: 0 | Status: SHIPPED | OUTPATIENT
Start: 2021-07-30 | End: 2021-09-20

## 2021-07-30 RX ORDER — DEXAMETHASONE 4 MG/1
20 TABLET ORAL ONCE
Status: COMPLETED | OUTPATIENT
Start: 2021-07-30 | End: 2021-07-30

## 2021-07-30 RX ORDER — DIPHENHYDRAMINE HCL 25 MG
50 CAPSULE ORAL ONCE
Status: COMPLETED | OUTPATIENT
Start: 2021-07-30 | End: 2021-07-30

## 2021-07-30 RX ADMIN — ACETAMINOPHEN 650 MG: 325 TABLET ORAL at 15:43

## 2021-07-30 RX ADMIN — BORTEZOMIB 2.5 MG: 3.5 INJECTION, POWDER, LYOPHILIZED, FOR SOLUTION INTRAVENOUS; SUBCUTANEOUS at 16:25

## 2021-07-30 RX ADMIN — DEXAMETHASONE 20 MG: 4 TABLET ORAL at 15:43

## 2021-07-30 RX ADMIN — DARATUMUMAB AND HYALURONIDASE-FIHJ (HUMAN RECOMBINANT) 1800 MG: 1800; 30000 INJECTION SUBCUTANEOUS at 16:31

## 2021-07-30 RX ADMIN — DIPHENHYDRAMINE HYDROCHLORIDE 50 MG: 25 CAPSULE ORAL at 15:43

## 2021-07-30 ASSESSMENT — PAIN SCALES - GENERAL: PAINLEVEL: NO PAIN (0)

## 2021-07-30 NOTE — PROGRESS NOTES
Infusion Nursing Note:  Lázaro Ronquillo presents today for Cycle 4 Day 1 Velcade, Darzalex.    Patient seen by provider today: No   present during visit today: Not Applicable.    Note: Continues to have fatigue but it's a little better. Reports tingling in left foot (it feels like it's going to sleep) when he sits, this then goes away when he gets up.  Has been happening for at least a week.  Denies pain, fevers/chills/issues with bowel/bladder.     Did sent inbasket to Dr. Vazquez and Alba Moses regarding this left foot. Patient instructed to call triage nurse should this increase in frequency or worsen.    Has a dentist appointment in the next 1-2 weeks for zometa dental clearance.    Treatment Conditions:  Results reviewed, labs MET treatment parameters, ok to proceed with treatment.  Results for LÁZARO RONQUILLO (MRN [id number]) as of 7/30/2021 16:01   Ref. Range 7/30/2021 15:13   Sodium Latest Ref Range: 133 - 144 mmol/L 139   Potassium Latest Ref Range: 3.4 - 5.3 mmol/L 3.8   Chloride Latest Ref Range: 94 - 109 mmol/L 106   Carbon Dioxide Latest Ref Range: 20 - 32 mmol/L 25   Urea Nitrogen Latest Ref Range: 7 - 30 mg/dL 12   Creatinine Latest Ref Range: 0.66 - 1.25 mg/dL 1.07   GFR Estimate Latest Ref Range: >60 mL/min/1.73m2 79   Calcium Latest Ref Range: 8.5 - 10.1 mg/dL 8.5   Anion Gap Latest Ref Range: 3 - 14 mmol/L 8   Albumin Latest Ref Range: 3.4 - 5.0 g/dL 3.3 (L)   Protein Total Latest Ref Range: 6.8 - 8.8 g/dL 6.0 (L)   Bilirubin Total Latest Ref Range: 0.2 - 1.3 mg/dL 0.5   Alkaline Phosphatase Latest Ref Range: 40 - 150 U/L 57   ALT Latest Ref Range: 0 - 70 U/L 23   AST Latest Ref Range: 0 - 45 U/L 21   Glucose Latest Ref Range: 70 - 99 mg/dL 94   WBC Latest Ref Range: 4.0 - 11.0 10e3/uL 10.6   Hemoglobin Latest Ref Range: 13.3 - 17.7 g/dL 10.5 (L)   Hematocrit Latest Ref Range: 40.0 - 53.0 % 31.7 (L)   Platelet Count Latest Ref Range: 150 - 450 10e3/uL 335   RBC Count Latest  Ref Range: 4.40 - 5.90 10e6/uL 3.40 (L)   MCV Latest Ref Range: 78 - 100 fL 93   MCH Latest Ref Range: 26.5 - 33.0 pg 30.9   MCHC Latest Ref Range: 31.5 - 36.5 g/dL 33.1   RDW Latest Ref Range: 10.0 - 15.0 % 18.3 (H)   % Neutrophils Latest Units: % 59   % Lymphocytes Latest Units: % 18   % Monocytes Latest Units: % 16   % Eosinophils Latest Units: % 6   Absolute Basophils Latest Ref Range: 0.0 - 0.2 10e3/uL 0.0   % Basophils Latest Units: % 0       Post Infusion Assessment:  Patient tolerated subcutaneous Velcade injection without incident to left arm.   Patient tolerated subcutaneous Darzalex injection without incident to left lower abdomen.     Discharge Plan:   Prescription refills given for Dexamethasone.  AVS to patient via [employer].  Patient will return 8/6/21 for next appointment.   Patient discharged in stable condition accompanied by: self.  Departure Mode: Ambulatory.  Face to Face time: 0.      Linda Chappell RN

## 2021-08-02 LAB
KAPPA LC FREE SER-MCNC: 0.57 MG/DL (ref 0.33–1.94)
KAPPA LC FREE/LAMBDA FREE SER NEPH: 1.3 {RATIO} (ref 0.26–1.65)
LAMBDA LC FREE SERPL-MCNC: 0.44 MG/DL (ref 0.57–2.63)
PROT PATTERN SERPL IFE-IMP: NORMAL

## 2021-08-04 ENCOUNTER — TELEPHONE (OUTPATIENT)
Dept: FAMILY MEDICINE | Facility: CLINIC | Age: 51
End: 2021-08-04

## 2021-08-04 DIAGNOSIS — E06.3 HYPOTHYROIDISM DUE TO HASHIMOTO'S THYROIDITIS: ICD-10-CM

## 2021-08-04 RX ORDER — LEVOTHYROXINE SODIUM 50 UG/1
50 TABLET ORAL DAILY
Qty: 90 TABLET | Refills: 1 | Status: SHIPPED | OUTPATIENT
Start: 2021-08-04 | End: 2021-11-03

## 2021-08-04 NOTE — TELEPHONE ENCOUNTER
"Requested Prescriptions   Signed Prescriptions Disp Refills    levothyroxine (SYNTHROID/LEVOTHROID) 50 MCG tablet 90 tablet 1     Sig: Take 1 tablet (50 mcg) by mouth daily       Thyroid Protocol Passed - 8/4/2021  4:51 PM        Passed - Patient is 12 years or older        Passed - Recent (12 mo) or future (30 days) visit within the authorizing provider's specialty     Patient has had an office visit with the authorizing provider or a provider within the authorizing providers department within the previous 12 mos or has a future within next 30 days. See \"Patient Info\" tab in inbasket, or \"Choose Columns\" in Meds & Orders section of the refill encounter.              Passed - Medication is active on med list        Passed - Normal TSH on file in past 12 months     Recent Labs   Lab Test 05/03/21  1153   TSH 3.96                 Bibi Chadwick RN  [facility]    "

## 2021-08-04 NOTE — TELEPHONE ENCOUNTER
Pt requesting refill of levothyroxine. Last filled 5/4/21 for a 90 day supply.    Rosmery Kowalski PharmD  [facility]  Phone: [phone]    
Refill encounter created.    Bibi Chadwick RN  [facility]    
no

## 2021-08-06 ENCOUNTER — APPOINTMENT (OUTPATIENT)
Dept: LAB | Facility: CLINIC | Age: 51
End: 2021-08-06
Payer: COMMERCIAL

## 2021-08-06 ENCOUNTER — INFUSION THERAPY VISIT (OUTPATIENT)
Dept: ONCOLOGY | Facility: CLINIC | Age: 51
End: 2021-08-06
Payer: COMMERCIAL

## 2021-08-06 ENCOUNTER — TELEPHONE (OUTPATIENT)
Dept: ONCOLOGY | Facility: CLINIC | Age: 51
End: 2021-08-06

## 2021-08-06 VITALS
TEMPERATURE: 98.2 F | WEIGHT: 161.3 LBS | DIASTOLIC BLOOD PRESSURE: 76 MMHG | RESPIRATION RATE: 16 BRPM | BODY MASS INDEX: 26.84 KG/M2 | OXYGEN SATURATION: 98 % | HEART RATE: 79 BPM | SYSTOLIC BLOOD PRESSURE: 122 MMHG

## 2021-08-06 DIAGNOSIS — C90.00 MULTIPLE MYELOMA NOT HAVING ACHIEVED REMISSION (H): Primary | ICD-10-CM

## 2021-08-06 DIAGNOSIS — C90.00 MULTIPLE MYELOMA NOT HAVING ACHIEVED REMISSION (H): ICD-10-CM

## 2021-08-06 LAB
ALBUMIN SERPL-MCNC: 3.8 G/DL (ref 3.4–5)
ALP SERPL-CCNC: 60 U/L (ref 40–150)
ALT SERPL W P-5'-P-CCNC: 21 U/L (ref 0–70)
ANION GAP SERPL CALCULATED.3IONS-SCNC: 9 MMOL/L (ref 3–14)
AST SERPL W P-5'-P-CCNC: 15 U/L (ref 0–45)
BASOPHILS # BLD AUTO: 0 10E3/UL (ref 0–0.2)
BASOPHILS NFR BLD AUTO: 0 %
BILIRUB SERPL-MCNC: 0.6 MG/DL (ref 0.2–1.3)
BUN SERPL-MCNC: 10 MG/DL (ref 7–30)
CALCIUM SERPL-MCNC: 8.6 MG/DL (ref 8.5–10.1)
CHLORIDE BLD-SCNC: 106 MMOL/L (ref 94–109)
CO2 SERPL-SCNC: 25 MMOL/L (ref 20–32)
CREAT SERPL-MCNC: 0.95 MG/DL (ref 0.66–1.25)
EOSINOPHIL # BLD AUTO: 0.3 10E3/UL (ref 0–0.7)
EOSINOPHIL NFR BLD AUTO: 4 %
ERYTHROCYTE [DISTWIDTH] IN BLOOD BY AUTOMATED COUNT: 18.5 % (ref 10–15)
GFR SERPL CREATININE-BSD FRML MDRD: >90 ML/MIN/1.73M2
GLUCOSE BLD-MCNC: 88 MG/DL (ref 70–99)
HCT VFR BLD AUTO: 33.5 % (ref 40–53)
HGB BLD-MCNC: 11 G/DL (ref 13.3–17.7)
IMM GRANULOCYTES # BLD: 0.1 10E3/UL
IMM GRANULOCYTES NFR BLD: 1 %
LYMPHOCYTES # BLD AUTO: 1.5 10E3/UL (ref 0.8–5.3)
LYMPHOCYTES NFR BLD AUTO: 16 %
MCH RBC QN AUTO: 30.2 PG (ref 26.5–33)
MCHC RBC AUTO-ENTMCNC: 32.8 G/DL (ref 31.5–36.5)
MCV RBC AUTO: 92 FL (ref 78–100)
MONOCYTES # BLD AUTO: 0.8 10E3/UL (ref 0–1.3)
MONOCYTES NFR BLD AUTO: 8 %
NEUTROPHILS # BLD AUTO: 6.3 10E3/UL (ref 1.6–8.3)
NEUTROPHILS NFR BLD AUTO: 71 %
NRBC # BLD AUTO: 0 10E3/UL
NRBC BLD AUTO-RTO: 0 /100
PLATELET # BLD AUTO: 300 10E3/UL (ref 150–450)
POTASSIUM BLD-SCNC: 3.4 MMOL/L (ref 3.4–5.3)
PROT SERPL-MCNC: 6.5 G/DL (ref 6.8–8.8)
RBC # BLD AUTO: 3.64 10E6/UL (ref 4.4–5.9)
SODIUM SERPL-SCNC: 140 MMOL/L (ref 133–144)
WBC # BLD AUTO: 9 10E3/UL (ref 4–11)

## 2021-08-06 PROCEDURE — 36415 COLL VENOUS BLD VENIPUNCTURE: CPT

## 2021-08-06 PROCEDURE — 250N000011 HC RX IP 250 OP 636: Performed by: STUDENT IN AN ORGANIZED HEALTH CARE EDUCATION/TRAINING PROGRAM

## 2021-08-06 PROCEDURE — 82040 ASSAY OF SERUM ALBUMIN: CPT

## 2021-08-06 PROCEDURE — 96401 CHEMO ANTI-NEOPL SQ/IM: CPT

## 2021-08-06 PROCEDURE — 85025 COMPLETE CBC W/AUTO DIFF WBC: CPT | Performed by: STUDENT IN AN ORGANIZED HEALTH CARE EDUCATION/TRAINING PROGRAM

## 2021-08-06 RX ORDER — LENALIDOMIDE 25 MG/1
25 CAPSULE ORAL DAILY
Qty: 14 CAPSULE | Refills: 0 | Status: SHIPPED | OUTPATIENT
Start: 2021-08-06 | End: 2021-08-31

## 2021-08-06 RX ADMIN — BORTEZOMIB 2.5 MG: 3.5 INJECTION, POWDER, LYOPHILIZED, FOR SOLUTION INTRAVENOUS; SUBCUTANEOUS at 15:56

## 2021-08-06 ASSESSMENT — PAIN SCALES - GENERAL: PAINLEVEL: NO PAIN (0)

## 2021-08-06 NOTE — TELEPHONE ENCOUNTER
Oral Chemotherapy Monitoring Program   Medication: Revlimid  Rx: 25mg PO daily on days 1 through 14 of 21 day cycle   Auth #: [id number]   Risk Category: Adult Male  Routine survey questions reviewed.   Rx to be Escribed to [facility]

## 2021-08-06 NOTE — PROGRESS NOTES
Infusion Nursing Note:  Lázaro Ronquillo presents today for Cycle 4 Day 8 Velcade.    Patient seen by provider today: No   present during visit today: Not Applicable.    Note: Denies fevers/chills.  Reports that his left foot numbness/tingling frequency has decreased but he said he's been up walking more on a schedule as the foot goes to sleep when he's sitting.  Message to Alba Moses and Dr. Vazquez with update on that.  Alba will see him virtually this Friday.    He cannot come on 8/27 as scheduled as he will be of [facility]. He requests to come on 8/30.  I/B to scheduling.    He will see dentist on Tuesday.  He will discuss Zometa w/dentist and he printed information.    Treatment Conditions:  Results reviewed, labs MET treatment parameters, ok to proceed with treatment.  Results for LÁZARO RONQUILLO (MRN [id number]) as of 8/6/2021 15:34   Ref. Range 8/6/2021 14:58   Sodium Latest Ref Range: 133 - 144 mmol/L 140   Potassium Latest Ref Range: 3.4 - 5.3 mmol/L 3.4   Chloride Latest Ref Range: 94 - 109 mmol/L 106   Carbon Dioxide Latest Ref Range: 20 - 32 mmol/L 25   Urea Nitrogen Latest Ref Range: 7 - 30 mg/dL 10   Creatinine Latest Ref Range: 0.66 - 1.25 mg/dL 0.95   GFR Estimate Latest Ref Range: >60 mL/min/1.73m2 >90   Calcium Latest Ref Range: 8.5 - 10.1 mg/dL 8.6   Anion Gap Latest Ref Range: 3 - 14 mmol/L 9   Albumin Latest Ref Range: 3.4 - 5.0 g/dL 3.8   Protein Total Latest Ref Range: 6.8 - 8.8 g/dL 6.5 (L)   Bilirubin Total Latest Ref Range: 0.2 - 1.3 mg/dL 0.6   Alkaline Phosphatase Latest Ref Range: 40 - 150 U/L 60   ALT Latest Ref Range: 0 - 70 U/L 21   AST Latest Ref Range: 0 - 45 U/L 15   Glucose Latest Ref Range: 70 - 99 mg/dL 88   WBC Latest Ref Range: 4.0 - 11.0 10e3/uL 9.0   Hemoglobin Latest Ref Range: 13.3 - 17.7 g/dL 11.0 (L)   Hematocrit Latest Ref Range: 40.0 - 53.0 % 33.5 (L)   Platelet Count Latest Ref Range: 150 - 450 10e3/uL 300   RBC Count Latest Ref Range: 4.40 - 5.90  10e6/uL 3.64 (L)   MCV Latest Ref Range: 78 - 100 fL 92   MCH Latest Ref Range: 26.5 - 33.0 pg 30.2   MCHC Latest Ref Range: 31.5 - 36.5 g/dL 32.8   RDW Latest Ref Range: 10.0 - 15.0 % 18.5 (H)   % Neutrophils Latest Units: % 71   % Lymphocytes Latest Units: % 16   % Monocytes Latest Units: % 8   % Eosinophils Latest Units: % 4   Absolute Basophils Latest Ref Range: 0.0 - 0.2 10e3/uL 0.0   % Basophils Latest Units: % 0   Absolute Eosinophils Latest Ref Range: 0.0 - 0.7 10e3/uL 0.3   Absolute Immature Granulocytes Latest Ref Range: <=0.0 10e3/uL 0.1 (H)   Absolute Lymphocytes Latest Ref Range: 0.8 - 5.3 10e3/uL 1.5   Absolute Monocytes Latest Ref Range: 0.0 - 1.3 10e3/uL 0.8   % Immature Granulocytes Latest Units: % 1   Absolute Neutrophils Latest Ref Range: 1.6 - 8.3 10e3/uL 6.3   Absolute NRBCs Latest Units: 10e3/uL 0.0   NRBCs per 100 WBC Latest Ref Range: <1 /100 0       Post Infusion Assessment:  Patient tolerated subcutaneous Velcade injection without inciden to Right Arm       Discharge Plan:   Patient declined prescription refills.  AVS to patient via [employer].  Patient will return 8/13 virtual visit with Alba for next appointment.   Patient discharged in stable condition accompanied by: self.  Departure Mode: Ambulatory.  Face to Face time: 0.      Linda Chappell RN

## 2021-08-12 ENCOUNTER — CARE COORDINATION (OUTPATIENT)
Dept: TRANSPLANT | Facility: CLINIC | Age: 51
End: 2021-08-12

## 2021-08-12 NOTE — PROGRESS NOTES
[facility] BMT and Cell Therapy Program  RN Coordinator Pre-Visit Documentation      June Ronquillo is a 51 year old male who has been referred to the [facility] BMT and Cell Therapy Program for hematopoietic cell transplant or immune effector cell therapy.      Referring MD Name: Barbi Vazquez, telephone [phone]    Referring RN Coordinator: Sho Al    Reason for referral: multiple myeloma with high risk cytogenetics dx  4/30/2021    Link to Adult BMT algorithm        For allos only:    Previous HLA typing? No      Previous formal donor search? No    PRA needed? No, unless allo rec    CMV IGG and ABO needed? No, unless allo rec    Potential family donors to type? No, unless allo rec    Potential BMT CTN 1702 candidate? NO    Need URD consents? No, unless allo rec      All relevant clinical notes, labs, imaging, and pathology are available in [facility], Care Everywhere, or scanned into Media.        Patient Care Team       Relationship Specialty Notifications Start End    Selma Johnson APRN CNP PCP - General Nurse Practitioner - Family  7/21/19     Phone: [phone] Fax: [phone] [address] [place] [address] [address] [facility] [address]    Jose Ronquillo MD MD Dermatology  9/19/18     Phone: [phone] Fax: [phone]         [address] [place] [address]    Dominguez Craig MD Assigned Heart and Vascular Provider   10/23/20     Phone: [phone] Fax: [phone] [address] [facility] [address]    Selma Johnson APRN CNP Assigned PCP   1/31/21     Phone: [phone] Fax: [phone]         [address] [facility] [address] [facility] [address]    Barbi Vazquez MD MD Hematology & Oncology Admissions 5/6/21     Phone: [phone] Pager: [phone] Fax: [phone]        [address] [facility] [address] [facility] [address]    Sho cMkeon, RN Specialty Care Coordinator Hematology & Oncology Admissions 5/12/21     Phone: [phone]          Barbi Vazquez MD Assigned Cancer Care Provider   5/23/21     Phone: [phone] Pager: [phone] Fax: [phone]        [address] [place] [address]            Christine Reynolds RN

## 2021-08-13 ENCOUNTER — VIRTUAL VISIT (OUTPATIENT)
Dept: TRANSPLANT | Facility: CLINIC | Age: 51
End: 2021-08-13
Attending: INTERNAL MEDICINE
Payer: COMMERCIAL

## 2021-08-13 ENCOUNTER — VIRTUAL VISIT (OUTPATIENT)
Dept: ONCOLOGY | Facility: CLINIC | Age: 51
End: 2021-08-13
Attending: NURSE PRACTITIONER
Payer: COMMERCIAL

## 2021-08-13 DIAGNOSIS — R25.2 CALF CRAMP: Primary | ICD-10-CM

## 2021-08-13 DIAGNOSIS — C90.00 MULTIPLE MYELOMA NOT HAVING ACHIEVED REMISSION (H): Primary | ICD-10-CM

## 2021-08-13 DIAGNOSIS — C90.00 MULTIPLE MYELOMA NOT HAVING ACHIEVED REMISSION (H): ICD-10-CM

## 2021-08-13 PROCEDURE — 99213 OFFICE O/P EST LOW 20 MIN: CPT | Mod: 95 | Performed by: NURSE PRACTITIONER

## 2021-08-13 PROCEDURE — 99205 OFFICE O/P NEW HI 60 MIN: CPT | Mod: 95

## 2021-08-13 PROCEDURE — 99417 PROLNG OP E/M EACH 15 MIN: CPT

## 2021-08-13 RX ORDER — ALBUTEROL SULFATE 90 UG/1
1-2 AEROSOL, METERED RESPIRATORY (INHALATION)
Status: CANCELLED
Start: 2021-08-20

## 2021-08-13 RX ORDER — DIPHENHYDRAMINE HCL 25 MG
50 CAPSULE ORAL ONCE
Status: CANCELLED | OUTPATIENT
Start: 2021-08-20

## 2021-08-13 RX ORDER — MEPERIDINE HYDROCHLORIDE 25 MG/ML
25 INJECTION INTRAMUSCULAR; INTRAVENOUS; SUBCUTANEOUS EVERY 30 MIN PRN
Status: CANCELLED | OUTPATIENT
Start: 2021-08-30

## 2021-08-13 RX ORDER — HEPARIN SODIUM (PORCINE) LOCK FLUSH IV SOLN 100 UNIT/ML 100 UNIT/ML
5 SOLUTION INTRAVENOUS
Status: CANCELLED | OUTPATIENT
Start: 2021-08-30

## 2021-08-13 RX ORDER — HEPARIN SODIUM,PORCINE 10 UNIT/ML
5 VIAL (ML) INTRAVENOUS
Status: CANCELLED | OUTPATIENT
Start: 2021-08-30

## 2021-08-13 RX ORDER — DIPHENHYDRAMINE HCL 25 MG
50 CAPSULE ORAL ONCE
Status: CANCELLED | OUTPATIENT
Start: 2021-08-30

## 2021-08-13 RX ORDER — ALBUTEROL SULFATE 0.83 MG/ML
2.5 SOLUTION RESPIRATORY (INHALATION)
Status: CANCELLED | OUTPATIENT
Start: 2021-08-30

## 2021-08-13 RX ORDER — ALBUTEROL SULFATE 90 UG/1
1-2 AEROSOL, METERED RESPIRATORY (INHALATION)
Status: CANCELLED
Start: 2021-08-30

## 2021-08-13 RX ORDER — ACETAMINOPHEN 325 MG/1
650 TABLET ORAL ONCE
Status: CANCELLED | OUTPATIENT
Start: 2021-08-20

## 2021-08-13 RX ORDER — METHYLPREDNISOLONE SODIUM SUCCINATE 125 MG/2ML
125 INJECTION, POWDER, LYOPHILIZED, FOR SOLUTION INTRAMUSCULAR; INTRAVENOUS
Status: CANCELLED
Start: 2021-08-20

## 2021-08-13 RX ORDER — LORAZEPAM 2 MG/ML
0.5 INJECTION INTRAMUSCULAR EVERY 4 HOURS PRN
Status: CANCELLED
Start: 2021-08-20

## 2021-08-13 RX ORDER — ALBUTEROL SULFATE 0.83 MG/ML
2.5 SOLUTION RESPIRATORY (INHALATION)
Status: CANCELLED | OUTPATIENT
Start: 2021-08-20

## 2021-08-13 RX ORDER — LORAZEPAM 2 MG/ML
0.5 INJECTION INTRAMUSCULAR EVERY 4 HOURS PRN
Status: CANCELLED
Start: 2021-08-30

## 2021-08-13 RX ORDER — ACETAMINOPHEN 325 MG/1
650 TABLET ORAL ONCE
Status: CANCELLED | OUTPATIENT
Start: 2021-08-30

## 2021-08-13 RX ORDER — EPINEPHRINE 1 MG/ML
0.3 INJECTION, SOLUTION INTRAMUSCULAR; SUBCUTANEOUS EVERY 5 MIN PRN
Status: CANCELLED | OUTPATIENT
Start: 2021-08-20

## 2021-08-13 RX ORDER — HEPARIN SODIUM,PORCINE 10 UNIT/ML
5 VIAL (ML) INTRAVENOUS
Status: CANCELLED | OUTPATIENT
Start: 2021-08-20

## 2021-08-13 RX ORDER — MEPERIDINE HYDROCHLORIDE 25 MG/ML
25 INJECTION INTRAMUSCULAR; INTRAVENOUS; SUBCUTANEOUS EVERY 30 MIN PRN
Status: CANCELLED | OUTPATIENT
Start: 2021-08-20

## 2021-08-13 RX ORDER — DEXAMETHASONE 4 MG/1
20 TABLET ORAL ONCE
Status: CANCELLED | OUTPATIENT
Start: 2021-08-20

## 2021-08-13 RX ORDER — DIPHENHYDRAMINE HYDROCHLORIDE 50 MG/ML
50 INJECTION INTRAMUSCULAR; INTRAVENOUS
Status: CANCELLED
Start: 2021-08-20

## 2021-08-13 RX ORDER — SODIUM CHLORIDE 9 MG/ML
1000 INJECTION, SOLUTION INTRAVENOUS CONTINUOUS PRN
Status: CANCELLED
Start: 2021-08-20

## 2021-08-13 RX ORDER — HEPARIN SODIUM (PORCINE) LOCK FLUSH IV SOLN 100 UNIT/ML 100 UNIT/ML
5 SOLUTION INTRAVENOUS
Status: CANCELLED | OUTPATIENT
Start: 2021-08-20

## 2021-08-13 RX ORDER — SODIUM CHLORIDE 9 MG/ML
1000 INJECTION, SOLUTION INTRAVENOUS CONTINUOUS PRN
Status: CANCELLED
Start: 2021-08-30

## 2021-08-13 RX ORDER — DIPHENHYDRAMINE HYDROCHLORIDE 50 MG/ML
50 INJECTION INTRAMUSCULAR; INTRAVENOUS
Status: CANCELLED
Start: 2021-08-30

## 2021-08-13 RX ORDER — METHYLPREDNISOLONE SODIUM SUCCINATE 125 MG/2ML
125 INJECTION, POWDER, LYOPHILIZED, FOR SOLUTION INTRAMUSCULAR; INTRAVENOUS
Status: CANCELLED
Start: 2021-08-27

## 2021-08-13 RX ORDER — NALOXONE HYDROCHLORIDE 0.4 MG/ML
.1-.4 INJECTION, SOLUTION INTRAMUSCULAR; INTRAVENOUS; SUBCUTANEOUS
Status: CANCELLED | OUTPATIENT
Start: 2021-08-30

## 2021-08-13 RX ORDER — EPINEPHRINE 1 MG/ML
0.3 INJECTION, SOLUTION INTRAMUSCULAR; SUBCUTANEOUS EVERY 5 MIN PRN
Status: CANCELLED | OUTPATIENT
Start: 2021-08-30

## 2021-08-13 RX ORDER — NALOXONE HYDROCHLORIDE 0.4 MG/ML
.1-.4 INJECTION, SOLUTION INTRAMUSCULAR; INTRAVENOUS; SUBCUTANEOUS
Status: CANCELLED | OUTPATIENT
Start: 2021-08-20

## 2021-08-13 NOTE — LETTER
"    8/13/2021         RE: June Ronquillo  [address]  [facility] [address]        Dear Colleague,    Thank you for referring your patient, June Ronquillo, to the [facility]CER [facility]. Please see a copy of my visit note below.    June is a 51 year old who is being evaluated via a billable video visit.      How would you like to obtain your AVS? MyChart     If the video visit is dropped, the invitation should be resent by: Text to cell phone: [phone]     Will anyone else be joining your video visit? No         Vitals - Patient Reported  Weight (Patient Reported): 72.6 kg (160 lb)  Height (Patient Reported): 166.4 cm (5' 5.5\")  BMI (Based on Pt Reported Ht/Wt): 26.22  Pain Score: Moderate Pain (4)  Pain Loc:  (CALVES)    Juan Saenz LPN    Video Start Time: 241  Video-Visit Details    Type of service:  Video Visit    Video End Time:2:52 PM    Originating Location (pt. Location): Home    Distant Location (provider location):  [facility]     Platform used for Video Visit: [employer]     Reason for Visit:  Seen in f/u of multiple myeloma    Oncology HPI:   - 4/30/2021 M spike of 34.8 in urine.M spike in blood 0.2; IgG 702 with depressed IgA and IgM. Beta 2 microglobulin 2.7. Lambda  with K/L ratio of 0.01. WBC 11.1 with absolute eos of 1.0. hgb, plt, Cr (1.1), and albumin WNL.     -4/30/2021 CT abd/pelvis showed sclerotic marrow changes with numerous lytic appearing lesions throughout the imaged portions of the spine, pelvis and ribs.      -PET scan 5/11/2021 Numerous osteolytic lesions which predominantly demonstrate uptake below liver background levels. There is one intraosseous lesion in the left lateral 9th rib that demonstrates uptake above background, possibly due to nondisplaced fracture. Adjacent to this lesion is mild  hypermetabolism to the left pleura, with new small left pleural effusion, suspicious for malignant effusion " versus posttraumatic effusion. Pleural uptake may represent extramedullary myeloma extending along the intercostal space versus inflammation.     - BM bx 5/17/2021 with flow showing 4.0% plasma cells which express CD19 (dim), CD38 (bright), CD45 (dim), , and monotypic cytoplasmic lambda immunoglobulin light chains but lack CD20 and CD56.  Hypercellular bone marrow for age (approximately 75% cellularity) with   adequate trilineage hematopoiesis. Plasma cell infiltrate: Interstitial, lambda monotypic and representing approximately 60% the bone marrow cellularity, by  immunohistochemical stain.       -initiated Miracle-RVd 21 day with velcade on day 1, 8, 15  on 5/27/21     --Cycle 2 Miracle-RVd. Dose reduce dex to 80 mg d/t fatigue and stomach upset on dex days. Also having cough with basilar streaking on CXR    Interval history:   Logan is feeling well today. Noted some hives that developed on the back after his last velcade infusion. Took benedryl at home and is seeing resolution. Notes some increased calf cramping bilaterally. No leg swelling or redness. Feels like muscle cramps. Has been taking asa 325 mg daily.  -feeling less stomach upset since the dexamethasone frequency is decreased with cycle 4. Not needing antiacids.   -appetite remains poor on the days he is on revlimid. Improves on the week off. He eats in spite of not feeling hungry. His weight has been stable.    Current Outpatient Medications   Medication Sig Dispense Refill     acyclovir (ZOVIRAX) 400 MG tablet Take 1 tablet (400 mg) by mouth 2 times daily 60 tablet 6     aspirin (ASA) 325 MG tablet Take 1 tablet (325 mg) by mouth daily 30 tablet 4     ASPIRIN 81 MG OR TABS 1 TABLET DAILY       dexamethasone (DECADRON) 4 MG tablet Take 20 mg (four tabs) by mouth on Day 2 15 tablet 0     FISH OIL 1000 MG OR CAPS 1 bid       hydrochlorothiazide (HYDRODIURIL) 50 MG tablet Take 1 tablet (50 mg) by mouth daily 30 tablet 3     LENalidomide (REVLIMID) 25 MG  CAPS capsule Take 1 capsule (25 mg) by mouth daily 14 capsule 0     levofloxacin (LEVAQUIN) 250 MG tablet Take 1 tablet (250 mg) by mouth daily 30 tablet 4     levothyroxine (SYNTHROID/LEVOTHROID) 50 MCG tablet Take 1 tablet (50 mcg) by mouth daily 90 tablet 1     losartan (COZAAR) 50 MG tablet Take 1 tablet (50 mg) by mouth daily 90 tablet 2     order for DME Rib belt 1 Device 0     pantoprazole (PROTONIX) 20 MG EC tablet Take 1 tablet (20 mg) by mouth daily 30 tablet 4     potassium chloride ER (K-TAB/KLOR-CON) 10 MEQ CR tablet Take 1 tablet by mouth 2 times daily       potassium chloride ER (KLOR-CON M) 10 MEQ CR tablet Take 2 tablets (20 mEq) by mouth daily 30 tablet 3     prochlorperazine (COMPAZINE) 10 MG tablet Take 1 tablet (10 mg) by mouth every 6 hours as needed (Nausea/Vomiting) 30 tablet 5     rosuvastatin (CRESTOR) 40 MG tablet TAKE ONE TABLET BY MOUTH ONCE DAILY. MUST MAKE APPOINTMENT FOR ANNUAL FOLLOW UP FOR FURTHER REFILLS 90 tablet 1     vitamin D3 (CHOLECALCIFEROL) 250 mcg (03740 units) capsule Take 1 capsule (250 mcg) by mouth daily 30 capsule 11     vitamin D3 (CHOLECALCIFEROL) 250 mcg (42758 units) capsule Take 1 capsule by mouth daily       acyclovir (ZOVIRAX) 400 MG tablet Take 1 tablet (400 mg) by mouth 2 times daily Viral Prophylaxis. 60 tablet 11          Allergies   Allergen Reactions     Nkda [No Known Drug Allergies]          Video physical exam  General: Patient appears well in no acute distress.   Skin: No visualized rash or lesions on visualized skin  Eyes: EOMI, no erythema, sclera icterus or discharge noted  Resp: Appears to be breathing comfortably without accessory muscle usage, speaking in full sentences, no cough  MSK: Appears to have normal range of motion based on visualized movements  Neurologic: No apparent tremors, facial movements symmetric  Psych: affect pleasant, engaged, alert and oriented     There were no vitals taken for this visit.  Wt Readings from Last 4  Encounters:   08/06/21 73.2 kg (161 lb 4.8 oz)   07/30/21 73.7 kg (162 lb 6.4 oz)   07/16/21 73.3 kg (161 lb 8 oz)   07/09/21 76 kg (167 lb 9.6 oz)         Labs:   Results for LÁZARO CEDILLO (MRN [id number]) as of 8/13/2021 14:58   Ref. Range 8/6/2021 14:58   Sodium Latest Ref Range: 133 - 144 mmol/L 140   Potassium Latest Ref Range: 3.4 - 5.3 mmol/L 3.4   Chloride Latest Ref Range: 94 - 109 mmol/L 106   Carbon Dioxide Latest Ref Range: 20 - 32 mmol/L 25   Urea Nitrogen Latest Ref Range: 7 - 30 mg/dL 10   Creatinine Latest Ref Range: 0.66 - 1.25 mg/dL 0.95   GFR Estimate Latest Ref Range: >60 mL/min/1.73m2 >90   Calcium Latest Ref Range: 8.5 - 10.1 mg/dL 8.6   Anion Gap Latest Ref Range: 3 - 14 mmol/L 9   Albumin Latest Ref Range: 3.4 - 5.0 g/dL 3.8   Protein Total Latest Ref Range: 6.8 - 8.8 g/dL 6.5 (L)   Bilirubin Total Latest Ref Range: 0.2 - 1.3 mg/dL 0.6   Alkaline Phosphatase Latest Ref Range: 40 - 150 U/L 60   ALT Latest Ref Range: 0 - 70 U/L 21   AST Latest Ref Range: 0 - 45 U/L 15   Glucose Latest Ref Range: 70 - 99 mg/dL 88   WBC Latest Ref Range: 4.0 - 11.0 10e3/uL 9.0   Hemoglobin Latest Ref Range: 13.3 - 17.7 g/dL 11.0 (L)   Hematocrit Latest Ref Range: 40.0 - 53.0 % 33.5 (L)   Platelet Count Latest Ref Range: 150 - 450 10e3/uL 300   RBC Count Latest Ref Range: 4.40 - 5.90 10e6/uL 3.64 (L)   MCV Latest Ref Range: 78 - 100 fL 92   MCH Latest Ref Range: 26.5 - 33.0 pg 30.2   MCHC Latest Ref Range: 31.5 - 36.5 g/dL 32.8   RDW Latest Ref Range: 10.0 - 15.0 % 18.5 (H)   % Neutrophils Latest Units: % 71   % Lymphocytes Latest Units: % 16   % Monocytes Latest Units: % 8   % Eosinophils Latest Units: % 4   Absolute Basophils Latest Ref Range: 0.0 - 0.2 10e3/uL 0.0   % Basophils Latest Units: % 0   Absolute Eosinophils Latest Ref Range: 0.0 - 0.7 10e3/uL 0.3   Absolute Immature Granulocytes Latest Ref Range: <=0.0 10e3/uL 0.1 (H)   Absolute Lymphocytes Latest Ref Range: 0.8 - 5.3 10e3/uL 1.5   Absolute  Monocytes Latest Ref Range: 0.0 - 1.3 10e3/uL 0.8   % Immature Granulocytes Latest Units: % 1   Absolute Neutrophils Latest Ref Range: 1.6 - 8.3 10e3/uL 6.3   Absolute NRBCs Latest Units: 10e3/uL 0.0   NRBCs per 100 WBC Latest Ref Range: <1 /100 0       Imaging: n/a    Impression/plan:   Multiple myeloma with high risk features  -on vidya-RVD, due for cycle 5 on 8/20  -tolerating better with less frequent dex doses  -had hives after the last velcade injection. Improving with benedryl.  Will premed with benedryl/tylenol   -has a BMT consultation today. Will f/u with Dr. Vazquez prior to cycle 6    DVT prophy  On  mg daily. Will check BLE US to eval new calf cramping.    Hives: unusual for velcade, but that is the only treatment he got on the day he developed hives. Add tylenol/benadryl premed to day 8.    Bone health, hx of rib fx, L non displaced  -not yet on zometa. Is now seeing his dentist and plans to have his last dental procedure on 8/25. Likely can add zometa with his next cycle    ID: afebrile  -prophy  mg bid to continue    Steroid induced gastritis  -resolved.         Again, thank you for allowing me to participate in the care of your patient.        Sincerely,        PATTY Rooney CNP

## 2021-08-13 NOTE — LETTER
8/13/2021         RE: June Ronquillo  [address]  [facility] [address]        Dear Colleague,    Thank you for referring your patient, June Ronquillo, to the [facility]. Please see a copy of my visit note below.           BMT Consultation      June Ronquillo is a 51 year old male referred by Dr. 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      Hematologic history:    Oncologic Hx:   - Presented in April with hematuria. Work up revealed a monoclonal protein in the urine (34.8) Large monoclonal free immunoglobulin light chain of lambda chain type and blood 0.2 Monoclonal free immunoglobulin light chain of lambda chain type. A CT scan A/P showed numerous lytic lesions in the spine, ribs, and pelvis.      - 4/30/2021 Baseline labs WBC 11.1, hemoglobin 13 within normal, platelets 4/6/2000, no increase in peripheral plasma cells in the blood, serum albumin 4 within normal, serum calcium 9 within normal serum creatinine 1.07, no LDH available, beta-2 microglobulin 2.7 elevated, serum free light chain kappa 0.8 within normal, serum free light chain lambda elevated at 103 mg/dL, IgG 702 within normal, IgA low at 42 mg/dL, IgM low at 12 mg/dL.    -4/30/2021 CT abd/pelvis showed sclerotic marrow changes with numerous lytic appearing lesions throughout the imaged portions of the spine, pelvis and ribs.      -PET scan 5/11/2021 Numerous osteolytic lesions which predominantly demonstrate uptake below liver background levels. There is one intraosseous lesion in the left lateral 9th rib that demonstrates uptake above background, possibly due to nondisplaced fracture. Adjacent to this lesion is mild hypermetabolism to the left pleura, with new small left pleural effusion, suspicious for malignant effusion versus posttraumatic effusion. Pleural uptake may represent extramedullary myeloma extending along the intercostal space  versus inflammation.     - BM bx 5/17/2021   ---flow showing 4.0% plasma cells which express CD19 (dim), CD38 (bright), CD45 (dim), , and monotypic cytoplasmic lambda immunoglobulin light chains but lack CD20 and CD56. ---Hypercellular bone marrow for age (approximately 75% cellularity) with adequate trilineage hematopoiesis. Plasma cell infiltrate: Interstitial, lambda monotypic and representing approximately 60% the bone marrow cellularity, by  immunohistochemical stain. ---Cytpgenetics with 2 related hypodiploid clones. One with loss of Y, monosomy 13 and 14 (5%) and one with translocation of 8p and 14, derivative 16 with long arm replaced by extra copy 1q,monosomy 21.   ---FISH showed   - Gain of 1q (83%)   - Monosomy 13 (94%)   - Loss of IGH with (31%) or without (64%) rearrangement   - IGH-MYC fusion (24%)        Date Treatment Response Toxicities/Complications   5/27/2021 Miracle-RVd  21 day with velcade on days 1,8,15    7/30/21 C4D1 Pending repat PET/CT and BMB  SPEP 0.1, recent lambda light chain in the blood within normal                             HPI:  Please see my entry above for hematologic history.      Repots calves ans muscle pain for the past week. Reports numbness to feet since treatment, no F/C, no N/V/D, no bleeding, NO HA, reports LH since treatment initiation related to motion. No CP, MATAMOROS.       PMH  See below  He has CAD had MI when he was 28  HTN   HLD  Denied  History of DM, no rheumatologic disorder, no PUD.  Np surgical history    SH  Never smoker, no alcohol   Educator, still working as a consultant       ROS:    10 point ROS neg other than the symptoms noted above in the HPI.        Past Medical History:   Diagnosis Date     CAD (coronary artery disease)     s/p stent to CFX     Heart attack (H)      Hyperlipidemia LDL goal <100      Hypertension      Stented coronary artery      Thyroid disease        Past Surgical History:   Procedure Laterality Date     BONE MARROW BIOPSY,  BONE SPECIMEN, NEEDLE/TROCAR Left 5/17/2021    Procedure: BIOPSY, BONE MARROW with aspiration and ancillary studies;  Surgeon: Niharika Regalado MD;  Location: [facility] OR     HEART CATH PTCA SINGLE VESSEL  10/10/2004    Stent to CFX - [facility]        Family History   Problem Relation Age of Onset     Hypertension Mother      Hyperlipidemia Mother      Heart Disease Paternal Grandmother      Hyperlipidemia Sister      Hyperlipidemia Sister        Social History     Tobacco Use     Smoking status: Never Smoker     Smokeless tobacco: Never Used   Substance Use Topics     Alcohol use: Not Currently     Drug use: No         Allergies   Allergen Reactions     Nkda [No Known Drug Allergies]         Current Outpatient Medications   Medication Sig Dispense Refill     acyclovir (ZOVIRAX) 400 MG tablet Take 1 tablet (400 mg) by mouth 2 times daily 60 tablet 6     acyclovir (ZOVIRAX) 400 MG tablet Take 1 tablet (400 mg) by mouth 2 times daily Viral Prophylaxis. 60 tablet 11     aspirin (ASA) 325 MG tablet Take 1 tablet (325 mg) by mouth daily 30 tablet 4     ASPIRIN 81 MG OR TABS 1 TABLET DAILY       dexamethasone (DECADRON) 4 MG tablet Take 20 mg (four tabs) by mouth on Day 2 15 tablet 0     FISH OIL 1000 MG OR CAPS 1 bid       hydrochlorothiazide (HYDRODIURIL) 50 MG tablet Take 1 tablet (50 mg) by mouth daily 30 tablet 3     LENalidomide (REVLIMID) 25 MG CAPS capsule Take 1 capsule (25 mg) by mouth daily 14 capsule 0     levofloxacin (LEVAQUIN) 250 MG tablet Take 1 tablet (250 mg) by mouth daily 30 tablet 4     levothyroxine (SYNTHROID/LEVOTHROID) 50 MCG tablet Take 1 tablet (50 mcg) by mouth daily 90 tablet 1     losartan (COZAAR) 50 MG tablet Take 1 tablet (50 mg) by mouth daily 90 tablet 2     order for DME Rib belt 1 Device 0     pantoprazole (PROTONIX) 20 MG EC tablet Take 1 tablet (20 mg) by mouth daily 30 tablet 4     potassium chloride ER (K-TAB/KLOR-CON) 10 MEQ CR tablet Take 1 tablet by mouth 2 times daily        potassium chloride ER (KLOR-CON M) 10 MEQ CR tablet Take 2 tablets (20 mEq) by mouth daily 30 tablet 3     prochlorperazine (COMPAZINE) 10 MG tablet Take 1 tablet (10 mg) by mouth every 6 hours as needed (Nausea/Vomiting) 30 tablet 5     rosuvastatin (CRESTOR) 40 MG tablet TAKE ONE TABLET BY MOUTH ONCE DAILY. MUST MAKE APPOINTMENT FOR ANNUAL FOLLOW UP FOR FURTHER REFILLS 90 tablet 1     vitamin D3 (CHOLECALCIFEROL) 250 mcg (87566 units) capsule Take 1 capsule (250 mcg) by mouth daily 30 capsule 11     vitamin D3 (CHOLECALCIFEROL) 250 mcg (96421 units) capsule Take 1 capsule by mouth daily           Physical Exam:     Vital Signs: NA  KPS: 80      ASSESSMENT AND PLAN:  51-year-old man with history of early coronary artery disease and with recent diagnosis of high risk multiple myeloma, status post 4 cycles of induction pending response assessment, referred for consideration of autologous metachromatic cell transplantation for consolidation    Rationale for stem cell autologous transplantation  We discussed with the patient today rationale for an autologous stem cell transplantation that allows the delivery of high myeloablative doses of melphalan followed by stem cell rescue to induce a deeper and more prolonged duration of remission, and is standard of care. We also explained that in general review plasma cell disorders as incurable disease however with good treatment options with multiple FDA approved drugs within the last few years.  We also discussed with the patient that autologous transplant on presentation for multiple myeloma is not curative.     I discussed with the patient today that when successful, autologous stem cell transplantion is associated with deeper responses and better clinical outcomes, organ function and quality of life improve. We discussed with the patient increased risk of early transplant-related mortality relating but not limited to life-threatening infections and sepsis, cardiac  arrhythmias, bleeding, multiorgan failure. We discussed however that autologous stem cell transplantation has been associated with improvement in PFS.     We discussed 2-3% of treatment related mortality (further determined on comorbidity index, in particular his biggest concern and risk factor is coronary artery disease.), largely from infection and organ damage. Around 50% of patients are re-admitted post-transplant due to neutropenic fever, mucositis, and dehydration. We also discussed the long term risk of therapy related MDS and leukemia (5%). We expect 24/7 caregiver to be available through day 30. We would ask her to come back for disease re-staging at day 100, day 180, 1 year, 18 months, and 2 years post-transplant per our current protocol.      Strong data support the PFS and overall survival benefit of post transplant maintanance therapy for at least 2 years.      We discussed the process of CD 34 stem cell collection process using colony-stimulating factor . We also discussed that we will collect enough CD34 cells for 1 transplantation given his high risk plasma cell myeloma.     June understood the above assessment and recommendations.  Multiple questions answered.  No barriers to learning identified.       Known issues that I take into account for medical decisions, with salient changes to the plan considering these complexities noted above.    Patient Active Problem List   Diagnosis     CARDIOVASCULAR SCREENING; LDL GOAL LESS THAN 100     FH: heart disease     Overweight (BMI 25.0-29.9)     Hyperlipidemia LDL goal <100     Concussion without loss of consciousness, initial encounter     Concussion without loss of consciousness, subsequent encounter     Hypertension     CAD (coronary artery disease)     Pre-diabetes     Increased thyroid stimulating hormone (TSH) level     Increased liver enzymes     Hepatitis A antibody positive     Hypothyroidism due to Hashimoto's thyroiditis     Multiple  myeloma not having achieved remission (H)     Keratoconus, stable condition         I spent 100 minutes in the care of this patient today, which included time necessary for preparation for the visit, obtaining history, ordering medications/tests/procedures as medically indicated, review of pertinent medical literature, counseling of the patient, communication of recommendations to the care team, and documentation time.    Plan:  -Discussed with referring oncologist restaging given his good biochemical response, will obtain a PET/CT as well as a bone marrow biopsy  -Discussed with the patient that if his full restaging shows VGPR or ideally CR he would be ready to proceed with transplant work-up.  Alerted the office that this would be very soon.  -He needs oncocardiology assessment and screening during work-up week for clearance prior to transplantation    Cassie Newsome      ------------------------------------------------------------------------------------------------------------------------------------------------    Patient Care Team       Relationship Specialty Notifications Start End    Selma Johnson APRN CNP PCP - General Nurse Practitioner - Family  7/21/19     Phone: [phone] Fax: [phone]         [address] [place] [address] [address] [facility] [address]    Jose Ronquillo MD MD Dermatology  9/19/18     Phone: [phone] Fax: [phone]         [address] [facility] [address]    Dominguez Craig MD Assigned Heart and Vascular Provider   10/23/20     Phone: [phone] Fax: [phone] [address] [facility] [address]    Selma Johnson APRN CNP Assigned PCP   1/31/21     Phone: [phone] Fax: [phone]         [address] [place] [address] 700 [facility] [address]    Barbi Vazquez MD MD Hematology & Oncology Admissions 5/6/21     Phone: [phone] Pager: [phone] Fax: [phone]        [address] [place] [address] 480 [facility] [address]    Sho Mckeon RN  Specialty Care Coordinator Hematology & Oncology Admissions 5/12/21     Phone: [phone]         Barbi Vazquez MD Assigned Cancer Care Provider   5/23/21     Phone: [phone] Pager: [phone] Fax: [phone] [address] [address]          Again, thank you for allowing me to participate in the care of your patient.        Sincerely,        BMT DOM

## 2021-08-13 NOTE — PROGRESS NOTES
BMT Consultation      June Ronquillo is a 51 year old male referred by Dr. 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      Hematologic history:    Oncologic Hx:   - Presented in April with hematuria. Work up revealed a monoclonal protein in the urine (34.8) Large monoclonal free immunoglobulin light chain of lambda chain type and blood 0.2 Monoclonal free immunoglobulin light chain of lambda chain type. A CT scan A/P showed numerous lytic lesions in the spine, ribs, and pelvis.      - 4/30/2021 Baseline labs WBC 11.1, hemoglobin 13 within normal, platelets 4/6/2000, no increase in peripheral plasma cells in the blood, serum albumin 4 within normal, serum calcium 9 within normal serum creatinine 1.07, no LDH available, beta-2 microglobulin 2.7 elevated, serum free light chain kappa 0.8 within normal, serum free light chain lambda elevated at 103 mg/dL, IgG 702 within normal, IgA low at 42 mg/dL, IgM low at 12 mg/dL.    -4/30/2021 CT abd/pelvis showed sclerotic marrow changes with numerous lytic appearing lesions throughout the imaged portions of the spine, pelvis and ribs.      -PET scan 5/11/2021 Numerous osteolytic lesions which predominantly demonstrate uptake below liver background levels. There is one intraosseous lesion in the left lateral 9th rib that demonstrates uptake above background, possibly due to nondisplaced fracture. Adjacent to this lesion is mild hypermetabolism to the left pleura, with new small left pleural effusion, suspicious for malignant effusion versus posttraumatic effusion. Pleural uptake may represent extramedullary myeloma extending along the intercostal space versus inflammation.     - BM bx 5/17/2021   ---flow showing 4.0% plasma cells which express CD19 (dim), CD38 (bright), CD45 (dim), , and monotypic cytoplasmic lambda immunoglobulin light chains but lack CD20 and CD56. ---Hypercellular bone marrow for age (approximately 75% cellularity)  with adequate trilineage hematopoiesis. Plasma cell infiltrate: Interstitial, lambda monotypic and representing approximately 60% the bone marrow cellularity, by  immunohistochemical stain. ---Cytpgenetics with 2 related hypodiploid clones. One with loss of Y, monosomy 13 and 14 (5%) and one with translocation of 8p and 14, derivative 16 with long arm replaced by extra copy 1q,monosomy 21.   ---FISH showed   - Gain of 1q (83%)   - Monosomy 13 (94%)   - Loss of IGH with (31%) or without (64%) rearrangement   - IGH-MYC fusion (24%)        Date Treatment Response Toxicities/Complications   5/27/2021 Miracle-RVd  21 day with velcade on days 1,8,15    7/30/21 C4D1 Pending repat PET/CT and BMB  SPEP 0.1, recent lambda light chain in the blood within normal                             HPI:  Please see my entry above for hematologic history.      Repots calves ans muscle pain for the past week. Reports numbness to feet since treatment, no F/C, no N/V/D, no bleeding, NO HA, reports LH since treatment initiation related to motion. No CP, MATAMOROS.       PMH  See below  He has CAD had MI when he was 28  HTN   HLD  Denied  History of DM, no rheumatologic disorder, no PUD.  Np surgical history    SH  Never smoker, no alcohol   Educator, still working as a consultant       ROS:    10 point ROS neg other than the symptoms noted above in the HPI.        Past Medical History:   Diagnosis Date     CAD (coronary artery disease)     s/p stent to CFX     Heart attack (H)      Hyperlipidemia LDL goal <100      Hypertension      Stented coronary artery      Thyroid disease        Past Surgical History:   Procedure Laterality Date     BONE MARROW BIOPSY, BONE SPECIMEN, NEEDLE/TROCAR Left 5/17/2021    Procedure: BIOPSY, BONE MARROW with aspiration and ancillary studies;  Surgeon: Niharika Regalado MD;  Location: [facility] OR     HEART CATH PTCA SINGLE VESSEL  10/10/2004    Stent to CFX - [facility]        Family History   Problem Relation Age  of Onset     Hypertension Mother      Hyperlipidemia Mother      Heart Disease Paternal Grandmother      Hyperlipidemia Sister      Hyperlipidemia Sister        Social History     Tobacco Use     Smoking status: Never Smoker     Smokeless tobacco: Never Used   Substance Use Topics     Alcohol use: Not Currently     Drug use: No         Allergies   Allergen Reactions     Nkda [No Known Drug Allergies]         Current Outpatient Medications   Medication Sig Dispense Refill     acyclovir (ZOVIRAX) 400 MG tablet Take 1 tablet (400 mg) by mouth 2 times daily 60 tablet 6     acyclovir (ZOVIRAX) 400 MG tablet Take 1 tablet (400 mg) by mouth 2 times daily Viral Prophylaxis. 60 tablet 11     aspirin (ASA) 325 MG tablet Take 1 tablet (325 mg) by mouth daily 30 tablet 4     ASPIRIN 81 MG OR TABS 1 TABLET DAILY       dexamethasone (DECADRON) 4 MG tablet Take 20 mg (four tabs) by mouth on Day 2 15 tablet 0     FISH OIL 1000 MG OR CAPS 1 bid       hydrochlorothiazide (HYDRODIURIL) 50 MG tablet Take 1 tablet (50 mg) by mouth daily 30 tablet 3     LENalidomide (REVLIMID) 25 MG CAPS capsule Take 1 capsule (25 mg) by mouth daily 14 capsule 0     levofloxacin (LEVAQUIN) 250 MG tablet Take 1 tablet (250 mg) by mouth daily 30 tablet 4     levothyroxine (SYNTHROID/LEVOTHROID) 50 MCG tablet Take 1 tablet (50 mcg) by mouth daily 90 tablet 1     losartan (COZAAR) 50 MG tablet Take 1 tablet (50 mg) by mouth daily 90 tablet 2     order for DME Rib belt 1 Device 0     pantoprazole (PROTONIX) 20 MG EC tablet Take 1 tablet (20 mg) by mouth daily 30 tablet 4     potassium chloride ER (K-TAB/KLOR-CON) 10 MEQ CR tablet Take 1 tablet by mouth 2 times daily       potassium chloride ER (KLOR-CON M) 10 MEQ CR tablet Take 2 tablets (20 mEq) by mouth daily 30 tablet 3     prochlorperazine (COMPAZINE) 10 MG tablet Take 1 tablet (10 mg) by mouth every 6 hours as needed (Nausea/Vomiting) 30 tablet 5     rosuvastatin (CRESTOR) 40 MG tablet TAKE ONE TABLET BY  MOUTH ONCE DAILY. MUST MAKE APPOINTMENT FOR ANNUAL FOLLOW UP FOR FURTHER REFILLS 90 tablet 1     vitamin D3 (CHOLECALCIFEROL) 250 mcg (78192 units) capsule Take 1 capsule (250 mcg) by mouth daily 30 capsule 11     vitamin D3 (CHOLECALCIFEROL) 250 mcg (14497 units) capsule Take 1 capsule by mouth daily           Physical Exam:     Vital Signs: NA  KPS: 80      ASSESSMENT AND PLAN:  51-year-old man with history of early coronary artery disease and with recent diagnosis of high risk multiple myeloma, status post 4 cycles of induction pending response assessment, referred for consideration of autologous metachromatic cell transplantation for consolidation    Rationale for stem cell autologous transplantation  We discussed with the patient today rationale for an autologous stem cell transplantation that allows the delivery of high myeloablative doses of melphalan followed by stem cell rescue to induce a deeper and more prolonged duration of remission, and is standard of care. We also explained that in general review plasma cell disorders as incurable disease however with good treatment options with multiple FDA approved drugs within the last few years.  We also discussed with the patient that autologous transplant on presentation for multiple myeloma is not curative.     I discussed with the patient today that when successful, autologous stem cell transplantion is associated with deeper responses and better clinical outcomes, organ function and quality of life improve. We discussed with the patient increased risk of early transplant-related mortality relating but not limited to life-threatening infections and sepsis, cardiac arrhythmias, bleeding, multiorgan failure. We discussed however that autologous stem cell transplantation has been associated with improvement in PFS.     We discussed 2-3% of treatment related mortality (further determined on comorbidity index, in particular his biggest concern and risk factor is  coronary artery disease.), largely from infection and organ damage. Around 50% of patients are re-admitted post-transplant due to neutropenic fever, mucositis, and dehydration. We also discussed the long term risk of therapy related MDS and leukemia (5%). We expect 24/7 caregiver to be available through day 30. We would ask her to come back for disease re-staging at day 100, day 180, 1 year, 18 months, and 2 years post-transplant per our current protocol.      Strong data support the PFS and overall survival benefit of post transplant maintanance therapy for at least 2 years.      We discussed the process of CD 34 stem cell collection process using colony-stimulating factor . We also discussed that we will collect enough CD34 cells for 1 transplantation given his high risk plasma cell myeloma.     June understood the above assessment and recommendations.  Multiple questions answered.  No barriers to learning identified.       Known issues that I take into account for medical decisions, with salient changes to the plan considering these complexities noted above.    Patient Active Problem List   Diagnosis     CARDIOVASCULAR SCREENING; LDL GOAL LESS THAN 100     FH: heart disease     Overweight (BMI 25.0-29.9)     Hyperlipidemia LDL goal <100     Concussion without loss of consciousness, initial encounter     Concussion without loss of consciousness, subsequent encounter     Hypertension     CAD (coronary artery disease)     Pre-diabetes     Increased thyroid stimulating hormone (TSH) level     Increased liver enzymes     Hepatitis A antibody positive     Hypothyroidism due to Hashimoto's thyroiditis     Multiple myeloma not having achieved remission (H)     Keratoconus, stable condition         I spent 100 minutes in the care of this patient today, which included time necessary for preparation for the visit, obtaining history, ordering medications/tests/procedures as medically indicated, review of pertinent  medical literature, counseling of the patient, communication of recommendations to the care team, and documentation time.    Plan:  -Discussed with referring oncologist restaging given his good biochemical response, will obtain a PET/CT as well as a bone marrow biopsy  -Discussed with the patient that if his full restaging shows VGPR or ideally CR he would be ready to proceed with transplant work-up.  Alerted the office that this would be very soon.  -He needs oncocardiology assessment and screening during work-up week for clearance prior to transplantation    Cassie Newsome      ------------------------------------------------------------------------------------------------------------------------------------------------    Patient Care Team       Relationship Specialty Notifications Start End    Selma Johnson APRN CNP PCP - General Nurse Practitioner - Family  7/21/19     Phone: [phone] Fax: [phone] [address] [facility] [address]    Jose Ronquillo MD MD Dermatology  9/19/18     Phone: [phone] Fax: [phone]         [address] [facility] [address]    Dominguez Craig MD Assigned Heart and Vascular Provider   10/23/20     Phone: [phone] Fax: [phone] [address] [facility] [address]    Selma Johnson APRN CNP Assigned PCP   1/31/21     Phone: [phone] Fax: [phone] [address] [facility] [address]    Barbi Vazquez MD MD Hematology & Oncology Admissions 5/6/21     Phone: [phone] Pager: [phone] Fax: [phone] [address] [address]    Sho Mckeon, RN Specialty Care Coordinator Hematology & Oncology Admissions 5/12/21     Phone: [phone]         Barbi Vazquez MD Assigned Cancer Care Provider   5/23/21     Phone: [phone] Pager: [phone] Fax: [phone] [address] [address]

## 2021-08-15 DIAGNOSIS — C90.00 MULTIPLE MYELOMA NOT HAVING ACHIEVED REMISSION (H): Primary | ICD-10-CM

## 2021-08-16 ENCOUNTER — ANCILLARY PROCEDURE (OUTPATIENT)
Dept: ULTRASOUND IMAGING | Facility: CLINIC | Age: 51
End: 2021-08-16
Attending: NURSE PRACTITIONER
Payer: COMMERCIAL

## 2021-08-16 DIAGNOSIS — R25.2 CALF CRAMP: ICD-10-CM

## 2021-08-16 DIAGNOSIS — C90.00 MULTIPLE MYELOMA NOT HAVING ACHIEVED REMISSION (H): ICD-10-CM

## 2021-08-16 PROCEDURE — 93970 EXTREMITY STUDY: CPT | Performed by: RADIOLOGY

## 2021-08-17 ENCOUNTER — TELEPHONE (OUTPATIENT)
Dept: TRANSPLANT | Facility: CLINIC | Age: 51
End: 2021-08-17

## 2021-08-17 ENCOUNTER — ALLIED HEALTH/NURSE VISIT (OUTPATIENT)
Dept: TRANSPLANT | Facility: CLINIC | Age: 51
End: 2021-08-17
Attending: INTERNAL MEDICINE
Payer: COMMERCIAL

## 2021-08-17 DIAGNOSIS — Z71.9 ENCOUNTER FOR COUNSELING: Primary | ICD-10-CM

## 2021-08-17 NOTE — TELEPHONE ENCOUNTER
LVM for patient in follow-up to NT visit with Dr. Du mcdaniel. BMT office number provided for call back with questions.    Plan: Autologous Transplant, pending completion of current therapy cycle. Patient will need Cards consult when he comes for workup d/t f/h in parents of MI at age 28 w/ CAD    Contact information provided for :  yes    HLA typing drawn: no    PRA typing drawn:  no    CMV-IgG and ABO-Rh drawn or in record:  no    Contact information provided for :  no    Financial Release for URD search obtained:  no    1341 Consent Signed: no    EOC Reason updated: yes

## 2021-08-18 NOTE — PROGRESS NOTES
Blood and Marrow Transplant   New Transplant Visit with   Clinical     Assessment completed on 2021 via phone as part of the COVID 19 protocol. Assessment of living situation, support system, financial status, functional status, coping, stressors, need for resources and social work intervention provided as needed. Information for this assessment was provided by pt's report in addition to medical chart review and consultation with medical team.     Present:  Patient: June Ronquillo  : IRASEMA Greene, [employer]    Medical Team   Nurse Coordinator: Christine Reynolds RN  BMT Physician: Cassie Newsome MD    Presenting Information:  Pt is a 51 year old male diagnosed with MM. Pt was diagnosed on 2021. Pt presents for autologous stem cell transplant discussion.    Contact Information:  Cell Phone: [phone]  Wife-Ginette Ronquillo's Phone:     Relocation Requirement:   Pt lives in [place], [place] which is within the required distance of the hospital. Pt does not need to relocate.     Living Situation:   Pt lives at home with his wife-Ginette, two adult children and grandchild.    Family Information:   Spouse: Ginette Ronquillo  Parents:   Siblings: 2 Siblings (Siblings do not live in [place])  Children: 2 Adult Children    Education/Employment:  Currently employed: Yes  Employer: [employer]    Spouse/Partner Employed: Yes    Insurance:   [employer] [place] Advantage. No insurance concerns identified at this time. KENYON provided information regarding the insurance authorization process and the role of the B[employer] Financial . KENYON provided contact info for the [employer]  and referred pt to them for future insurance questions.     Finances:   Payroll; pt will check to see if he has short term disability and long term disability benefits. No financial concerns identified at this time. KENYON discussed yumiko options and asked pt to let KENYON know if they would like to apply  in the future.     Caregiver:   SW discussed with the pt the caregiver role and expectation at length. Pt is agreeable to having a full time caregiver for the minimum of 30 days until cleared by the BMT Physician. Caregiver education and information provided. No caregiver concerns identified by pt.    Healthcare Directive:  No. SW provided education and forms. SW encouraged pt to have discussions with their family regarding their health care wishes.  In the absence of a healthcare document, SW discussed the [place] Policy on who would make decisions on his behalf if he did not have the capacity to make healthcare decisions.    Resources Provided:  -BMT Information Book  -BMT Resources Packet  -Healthcare Directive  -Honoring Choices - Your Rights: Making Your Own Health Care Treatment Decisions  -Caregiver Contract/Description  -Transplant Unit Description and Information   -Lodging Resources    Identified Concerns:  No concerns identified at this time.     Summary:  Pt presents to [facility] regarding an autologous stem cell transplant. Pt asked good/appropriate questions regarding psychosocial factors related to BMT; all questions were addressed. Pt presented as engaged. Pt does not need to relocate. No caregiver concerns identified by pt.    Plan:   SW provided contact information and encouraged pt to contact SW with any additional questions, concerns, resources and/or for support. SW will continue to follow pt to provide support and guidance with resources as needed.     IRASEMA Greene, [facility]  Phone: [phone]  Pager: [phone]

## 2021-08-20 ENCOUNTER — APPOINTMENT (OUTPATIENT)
Dept: LAB | Facility: CLINIC | Age: 51
End: 2021-08-20
Payer: COMMERCIAL

## 2021-08-21 ENCOUNTER — TELEPHONE (OUTPATIENT)
Dept: ONCOLOGY | Facility: CLINIC | Age: 51
End: 2021-08-21

## 2021-08-23 ENCOUNTER — INFUSION THERAPY VISIT (OUTPATIENT)
Dept: ONCOLOGY | Facility: CLINIC | Age: 51
End: 2021-08-23
Attending: NURSE PRACTITIONER
Payer: COMMERCIAL

## 2021-08-23 ENCOUNTER — APPOINTMENT (OUTPATIENT)
Dept: LAB | Facility: CLINIC | Age: 51
End: 2021-08-23
Attending: STUDENT IN AN ORGANIZED HEALTH CARE EDUCATION/TRAINING PROGRAM
Payer: COMMERCIAL

## 2021-08-23 ENCOUNTER — TELEPHONE (OUTPATIENT)
Dept: ONCOLOGY | Facility: CLINIC | Age: 51
End: 2021-08-23

## 2021-08-23 VITALS
TEMPERATURE: 98.3 F | WEIGHT: 166.1 LBS | OXYGEN SATURATION: 99 % | BODY MASS INDEX: 27.64 KG/M2 | HEART RATE: 94 BPM | DIASTOLIC BLOOD PRESSURE: 75 MMHG | RESPIRATION RATE: 18 BRPM | SYSTOLIC BLOOD PRESSURE: 125 MMHG

## 2021-08-23 DIAGNOSIS — C90.00 MULTIPLE MYELOMA NOT HAVING ACHIEVED REMISSION (H): Primary | ICD-10-CM

## 2021-08-23 DIAGNOSIS — L98.9 SKIN LESION: ICD-10-CM

## 2021-08-23 LAB
ALBUMIN SERPL-MCNC: 3.9 G/DL (ref 3.4–5)
ALP SERPL-CCNC: 58 U/L (ref 40–150)
ALT SERPL W P-5'-P-CCNC: 16 U/L (ref 0–70)
ANION GAP SERPL CALCULATED.3IONS-SCNC: 9 MMOL/L (ref 3–14)
AST SERPL W P-5'-P-CCNC: 12 U/L (ref 0–45)
BASOPHILS # BLD AUTO: 0 10E3/UL (ref 0–0.2)
BASOPHILS NFR BLD AUTO: 0 %
BILIRUB SERPL-MCNC: 0.5 MG/DL (ref 0.2–1.3)
BUN SERPL-MCNC: 11 MG/DL (ref 7–30)
CALCIUM SERPL-MCNC: 8.7 MG/DL (ref 8.5–10.1)
CHLORIDE BLD-SCNC: 106 MMOL/L (ref 94–109)
CO2 SERPL-SCNC: 25 MMOL/L (ref 20–32)
CREAT SERPL-MCNC: 0.97 MG/DL (ref 0.66–1.25)
EOSINOPHIL # BLD AUTO: 0.5 10E3/UL (ref 0–0.7)
EOSINOPHIL NFR BLD AUTO: 8 %
ERYTHROCYTE [DISTWIDTH] IN BLOOD BY AUTOMATED COUNT: 18.9 % (ref 10–15)
GFR SERPL CREATININE-BSD FRML MDRD: 89 ML/MIN/1.73M2
GLUCOSE BLD-MCNC: 145 MG/DL (ref 70–99)
HCT VFR BLD AUTO: 31 % (ref 40–53)
HGB BLD-MCNC: 10.4 G/DL (ref 13.3–17.7)
IMM GRANULOCYTES # BLD: 0 10E3/UL
IMM GRANULOCYTES NFR BLD: 0 %
LYMPHOCYTES # BLD AUTO: 1.4 10E3/UL (ref 0.8–5.3)
LYMPHOCYTES NFR BLD AUTO: 20 %
MCH RBC QN AUTO: 31 PG (ref 26.5–33)
MCHC RBC AUTO-ENTMCNC: 33.5 G/DL (ref 31.5–36.5)
MCV RBC AUTO: 93 FL (ref 78–100)
MONOCYTES # BLD AUTO: 1.1 10E3/UL (ref 0–1.3)
MONOCYTES NFR BLD AUTO: 16 %
NEUTROPHILS # BLD AUTO: 3.8 10E3/UL (ref 1.6–8.3)
NEUTROPHILS NFR BLD AUTO: 56 %
NRBC # BLD AUTO: 0 10E3/UL
NRBC BLD AUTO-RTO: 0 /100
PLATELET # BLD AUTO: 451 10E3/UL (ref 150–450)
POTASSIUM BLD-SCNC: 3.5 MMOL/L (ref 3.4–5.3)
PROT SERPL-MCNC: 6.5 G/DL (ref 6.8–8.8)
RBC # BLD AUTO: 3.35 10E6/UL (ref 4.4–5.9)
SODIUM SERPL-SCNC: 140 MMOL/L (ref 133–144)
TOTAL PROTEIN SERUM FOR ELP: 6.1 G/DL (ref 6.8–8.8)
WBC # BLD AUTO: 6.8 10E3/UL (ref 4–11)

## 2021-08-23 PROCEDURE — 85004 AUTOMATED DIFF WBC COUNT: CPT | Performed by: STUDENT IN AN ORGANIZED HEALTH CARE EDUCATION/TRAINING PROGRAM

## 2021-08-23 PROCEDURE — 84155 ASSAY OF PROTEIN SERUM: CPT

## 2021-08-23 PROCEDURE — 36415 COLL VENOUS BLD VENIPUNCTURE: CPT | Performed by: STUDENT IN AN ORGANIZED HEALTH CARE EDUCATION/TRAINING PROGRAM

## 2021-08-23 PROCEDURE — 250N000013 HC RX MED GY IP 250 OP 250 PS 637: Performed by: NURSE PRACTITIONER

## 2021-08-23 PROCEDURE — 84165 PROTEIN E-PHORESIS SERUM: CPT | Mod: TC | Performed by: STUDENT IN AN ORGANIZED HEALTH CARE EDUCATION/TRAINING PROGRAM

## 2021-08-23 PROCEDURE — 86334 IMMUNOFIX E-PHORESIS SERUM: CPT | Mod: TC

## 2021-08-23 PROCEDURE — 80053 COMPREHEN METABOLIC PANEL: CPT | Performed by: STUDENT IN AN ORGANIZED HEALTH CARE EDUCATION/TRAINING PROGRAM

## 2021-08-23 PROCEDURE — 96401 CHEMO ANTI-NEOPL SQ/IM: CPT

## 2021-08-23 PROCEDURE — 84165 PROTEIN E-PHORESIS SERUM: CPT | Mod: 26 | Performed by: STUDENT IN AN ORGANIZED HEALTH CARE EDUCATION/TRAINING PROGRAM

## 2021-08-23 PROCEDURE — 250N000012 HC RX MED GY IP 250 OP 636 PS 637: Performed by: NURSE PRACTITIONER

## 2021-08-23 PROCEDURE — 250N000011 HC RX IP 250 OP 636: Performed by: NURSE PRACTITIONER

## 2021-08-23 PROCEDURE — 86334 IMMUNOFIX E-PHORESIS SERUM: CPT | Mod: 26 | Performed by: STUDENT IN AN ORGANIZED HEALTH CARE EDUCATION/TRAINING PROGRAM

## 2021-08-23 PROCEDURE — 83883 ASSAY NEPHELOMETRY NOT SPEC: CPT

## 2021-08-23 RX ORDER — DEXAMETHASONE 4 MG/1
TABLET ORAL
Qty: 15 TABLET | Refills: 0 | Status: SHIPPED | OUTPATIENT
Start: 2021-08-23 | End: 2021-09-20

## 2021-08-23 RX ORDER — DIPHENHYDRAMINE HCL 25 MG
50 CAPSULE ORAL ONCE
Status: COMPLETED | OUTPATIENT
Start: 2021-08-23 | End: 2021-08-23

## 2021-08-23 RX ORDER — ACETAMINOPHEN 325 MG/1
650 TABLET ORAL ONCE
Status: COMPLETED | OUTPATIENT
Start: 2021-08-23 | End: 2021-08-23

## 2021-08-23 RX ORDER — DEXAMETHASONE 4 MG/1
20 TABLET ORAL ONCE
Status: COMPLETED | OUTPATIENT
Start: 2021-08-23 | End: 2021-08-23

## 2021-08-23 RX ADMIN — DARATUMUMAB AND HYALURONIDASE-FIHJ (HUMAN RECOMBINANT) 1800 MG: 1800; 30000 INJECTION SUBCUTANEOUS at 16:25

## 2021-08-23 RX ADMIN — BORTEZOMIB 2.5 MG: 3.5 INJECTION, POWDER, LYOPHILIZED, FOR SOLUTION INTRAVENOUS; SUBCUTANEOUS at 16:24

## 2021-08-23 RX ADMIN — DIPHENHYDRAMINE HYDROCHLORIDE 50 MG: 25 CAPSULE ORAL at 15:29

## 2021-08-23 RX ADMIN — ACETAMINOPHEN 650 MG: 325 TABLET ORAL at 15:29

## 2021-08-23 RX ADMIN — DEXAMETHASONE 20 MG: 4 TABLET ORAL at 15:29

## 2021-08-23 ASSESSMENT — PAIN SCALES - GENERAL: PAINLEVEL: MILD PAIN (2)

## 2021-08-23 NOTE — PATIENT INSTRUCTIONS
[facility] Triage and after hours / weekends / holidays:  [phone]    Please call the triage or after hours line if you experience a temperature greater than or equal to 100.5, shaking chills, have uncontrolled nausea, vomiting and/or diarrhea, dizziness, shortness of breath, chest pain, bleeding, unexplained bruising, or if you have any other new/concerning symptoms, questions or concerns.      If you are having any concerning symptoms or wish to speak to a provider before your next infusion visit, please call your care coordinator or triage to notify them so we can adequately serve you.     If you need a refill on a narcotic prescription or other medication, please call before your infusion appointment.                 August 2021 Sunday Monday Tuesday Wednesday Thursday Friday Saturday   1     2     3     4     5     6    LAB PERIPHERAL   2:15 PM   (15 min.)   [facility]   [facility]    ONC INFUSION 1 HR (60 MIN)   3:00 PM   (60 min.)    ONC INFUSION NURSE   AMIR[facility] 7       8     9     10     11     12     13    VIDEO VISIT RETURN   2:15 PM   (45 min.)   Alba Moses APRN CNP   [facility]    VIDEO VISIT NEW   3:15 PM   (90 min.)    BMT DOM   [facility] and Marrow Trans[facility] [place] 14       15     16    US LWR EXT VENOUS DUPLEX BILAT   3:15 PM   (60 min.)   UC[facility]3   [facility] 17    SOCIAL WORK TELEPHONE VISIT   2:15 PM   (90 min.)   Bella Garcia MSW   [facility]row Trans[facility] [place] 18     19     20    LAB PERIPHERAL   2:15 PM   (15 min.)   [facility]   [facility] 21       22     23    LAB PERIPHERAL   2:30 PM   (15 min.)   [facility]   [facility]    ONC INFUSION 1 HR (60 MIN)   3:00 PM   (60 min.)    ONC INFUSION NURSE   AMIRA  [facility] 24     25     26     27     28       29     30    LAB PERIPHERAL   2:45 PM   (15 min.)    [facility]   [facility]    ONC INFUSION 1 HR (60 MIN)   3:30 PM   (60 min.)   [facility] ONC INFUSION NURSE   [facility] 31    PE EYE/TH ONCOLOGY  10:00 AM   (45 min.)   [email or web address]   [facility]    UMP BONE MARROW BIOPSY   1:15 PM   (90 min.)   Shantell Valdez PA-C   [facility]                                 September 2021 Sunday Monday Tuesday Wednesday Thursday Friday Saturday                  1     2     3     4       5     6     7     8    LAB PERIPHERAL  11:15 AM   (15 min.)    [facility]   [facility]    RETURN  11:45 AM   (30 min.)   Barbi Vazquez MD   [facility] 9     10    ONC INFUSION 1 HR (60 MIN)   3:00 PM   (60 min.)   [facility] ONC INFUSION NURSE   [facility] 11       12     13     14     15     16     17    LAB PERIPHERAL   2:15 PM   (15 min.)   [facility]   [facility]    ONC INFUSION 1 HR (60 MIN)   3:00 PM   (60 min.)    ONC INFUSION NURSE   [facility] 18       19     20     21     22     23     24     25       26     27     28     29     30                               Lab Results:  Recent Results (from the past 12 hour(s))   Comprehensive metabolic panel    Collection Time: 08/23/21  2:43 PM   Result Value Ref Range    Sodium 140 133 - 144 mmol/L    Potassium 3.5 3.4 - 5.3 mmol/L    Chloride 106 94 - 109 mmol/L    Carbon Dioxide (CO2) 25 20 - 32 mmol/L    Anion Gap 9 3 - 14 mmol/L    Urea Nitrogen 11 7 - 30 mg/dL    Creatinine 0.97 0.66 - 1.25 mg/dL    Calcium 8.7 8.5 - 10.1 mg/dL    Glucose 145 (H) 70 - 99 mg/dL    Alkaline Phosphatase 58 40 - 150 U/L    AST 12 0 - 45 U/L    ALT 16 0 - 70 U/L     Protein Total 6.5 (L) 6.8 - 8.8 g/dL    Albumin 3.9 3.4 - 5.0 g/dL    Bilirubin Total 0.5 0.2 - 1.3 mg/dL    GFR Estimate 89 >60 mL/min/1.73m2   CBC with platelets and differential    Collection Time: 08/23/21  2:43 PM   Result Value Ref Range    WBC Count 6.8 4.0 - 11.0 10e3/uL    RBC Count 3.35 (L) 4.40 - 5.90 10e6/uL    Hemoglobin 10.4 (L) 13.3 - 17.7 g/dL    Hematocrit 31.0 (L) 40.0 - 53.0 %    MCV 93 78 - 100 fL    MCH 31.0 26.5 - 33.0 pg    MCHC 33.5 31.5 - 36.5 g/dL    RDW 18.9 (H) 10.0 - 15.0 %    Platelet Count 451 (H) 150 - 450 10e3/uL    % Neutrophils 56 %    % Lymphocytes 20 %    % Monocytes 16 %    % Eosinophils 8 %    % Basophils 0 %    % Immature Granulocytes 0 %    NRBCs per 100 WBC 0 <1 /100    Absolute Neutrophils 3.8 1.6 - 8.3 10e3/uL    Absolute Lymphocytes 1.4 0.8 - 5.3 10e3/uL    Absolute Monocytes 1.1 0.0 - 1.3 10e3/uL    Absolute Eosinophils 0.5 0.0 - 0.7 10e3/uL    Absolute Basophils 0.0 0.0 - 0.2 10e3/uL    Absolute Immature Granulocytes 0.0 <=0.0 10e3/uL    Absolute NRBCs 0.0 10e3/uL

## 2021-08-23 NOTE — PROGRESS NOTES
Infusion Nursing Note:  June Ronquillo presents today for Day 1 Cycle 5 subcutaneous Daratumumab and Velcade    Patient seen by provider today: No   present during visit today: Not Applicable.    Note: Patient presents to infusion feeling ok. Patient states for the past week, gums bleed when brushing with quick resolution. Patient also states two moles on back are have gotten darker in color. Patient otherwise denies acute complaints or concerns not addressed with Alba Moses CNP during appointment on 8/13/2021. Patient states bilateral leg muscle cramps have resolved however calves remain sore. No swelling, redness, chest pain, or shortness of breath noted. Patient also denies s/s of infection such as fever, sore throat, cough, chest pain, shortness of breath, or changes in taste/smell. Patient states he already has/started Revlimid Wednesday and has Dexamethasone to take 20mg the day after injection.    Dr. Vazquez notified of mole changes and bleeding gums with normal plt count:  TORB. 1521. 8/23/2021. Dr. Vazquez. Floyd Rivero RN. Decrease sun exposure/use sun screen. Dermatology referral to be ordered. Use soft tissue brush. Use water pick instead of dental floss.     Intravenous Access:  No Intravenous access at this visit.    Treatment Conditions:  Lab Results   Component Value Date    HGB 10.4 08/23/2021    HGB 10.9 07/09/2021     Lab Results   Component Value Date    WBC 6.8 08/23/2021    WBC 9.6 07/09/2021      Lab Results   Component Value Date    ANEU 3.8 08/23/2021     Lab Results   Component Value Date     08/23/2021     07/09/2021      Lab Results   Component Value Date     08/23/2021     07/09/2021                   Lab Results   Component Value Date    POTASSIUM 3.5 08/23/2021    POTASSIUM 3.7 07/09/2021           No results found for: MAG         Lab Results   Component Value Date    CR 0.97 08/23/2021    CR 0.87 07/09/2021                   Lab Results    Component Value Date    HARDEEP 8.7 08/23/2021    HARDEEP 8.4 07/09/2021                Lab Results   Component Value Date    BILITOTAL 0.5 08/23/2021    BILITOTAL 0.4 07/09/2021           Lab Results   Component Value Date    ALBUMIN 3.9 08/23/2021    ALBUMIN 3.2 07/09/2021                    Lab Results   Component Value Date    ALT 16 08/23/2021    ALT 25 07/09/2021           Lab Results   Component Value Date    AST 12 08/23/2021    AST 23 07/09/2021       Results reviewed, labs MET treatment parameters, ok to proceed with treatment.      Post Infusion Assessment:  Patient tolerated 1 subcutaneous injection in left upper arm of Velcade without incident.   Patient tolerated 1 subcutaneous injection inf left abdominal area of subcutaneous Miracle without incident.       Discharge Plan:   Patient declined prescription refills.  Discharge instructions reviewed with: Patient.  Patient and/or family verbalized understanding of discharge instructions and all questions answered.  AVS to patient via [employer].  Patient will return 8/30 for next appointment.   Patient discharged in stable condition accompanied by: self.  Departure Mode: Ambulatory.  Face to Face time: 0 minutes.      Floyd Rivero RN

## 2021-08-24 ENCOUNTER — TELEPHONE (OUTPATIENT)
Dept: ONCOLOGY | Facility: CLINIC | Age: 51
End: 2021-08-24

## 2021-08-24 LAB
KAPPA LC FREE SER-MCNC: 0.81 MG/DL (ref 0.33–1.94)
KAPPA LC FREE/LAMBDA FREE SER NEPH: 1.69 {RATIO} (ref 0.26–1.65)
LAMBDA LC FREE SERPL-MCNC: 0.48 MG/DL (ref 0.57–2.63)
PROT PATTERN SERPL IFE-IMP: NORMAL

## 2021-08-24 NOTE — TELEPHONE ENCOUNTER
PA Initiation    Medication: Darzalex Faspro- Rx benefit  Insurance Company: [employer]. - Phone [phone] Fax [phone]  Pharm[place] [facility] [place] [place] [address]  Filling Pharmacy Phone: [phone]  Filling Pharmacy Fax:    Start Date: 8/24/2021

## 2021-08-24 NOTE — TELEPHONE ENCOUNTER
Oral Chemotherapy Monitoring Program   Medication: Revlimid  Rx: 25mg PO daily on days 1 through 14 of 21 day cycle   Auth #: [id number]   Risk Category: Adult Male  Routine survey questions reviewed.   Rx to be Escribed to HUGO Persaud CPhT  [facility]  Oncology Pharmacy Liaison   [email or web address]  [phone] (phone  [phone] (fax

## 2021-08-25 LAB
ALBUMIN SERPL ELPH-MCNC: 3.9 G/DL (ref 3.7–5.1)
ALPHA1 GLOB SERPL ELPH-MCNC: 0.3 G/DL (ref 0.2–0.4)
ALPHA2 GLOB SERPL ELPH-MCNC: 0.7 G/DL (ref 0.5–0.9)
B-GLOBULIN SERPL ELPH-MCNC: 0.7 G/DL (ref 0.6–1)
GAMMA GLOB SERPL ELPH-MCNC: 0.4 G/DL (ref 0.7–1.6)
M PROTEIN SERPL ELPH-MCNC: 0.1 G/DL
PROT PATTERN SERPL ELPH-IMP: ABNORMAL

## 2021-08-26 RX ORDER — DIPHENHYDRAMINE HCL 25 MG
50 CAPSULE ORAL ONCE
Status: CANCELLED | OUTPATIENT
Start: 2021-09-13

## 2021-08-26 RX ORDER — MEPERIDINE HYDROCHLORIDE 25 MG/ML
25 INJECTION INTRAMUSCULAR; INTRAVENOUS; SUBCUTANEOUS EVERY 30 MIN PRN
Status: CANCELLED | OUTPATIENT
Start: 2021-09-20

## 2021-08-26 RX ORDER — EPINEPHRINE 1 MG/ML
0.3 INJECTION, SOLUTION INTRAMUSCULAR; SUBCUTANEOUS EVERY 5 MIN PRN
Status: CANCELLED | OUTPATIENT
Start: 2021-09-13

## 2021-08-26 RX ORDER — ACETAMINOPHEN 325 MG/1
650 TABLET ORAL ONCE
Status: CANCELLED | OUTPATIENT
Start: 2021-09-13

## 2021-08-26 RX ORDER — DIPHENHYDRAMINE HYDROCHLORIDE 50 MG/ML
50 INJECTION INTRAMUSCULAR; INTRAVENOUS
Status: CANCELLED
Start: 2021-09-20

## 2021-08-26 RX ORDER — ALBUTEROL SULFATE 90 UG/1
1-2 AEROSOL, METERED RESPIRATORY (INHALATION)
Status: CANCELLED
Start: 2021-09-13

## 2021-08-26 RX ORDER — HEPARIN SODIUM,PORCINE 10 UNIT/ML
5 VIAL (ML) INTRAVENOUS
Status: CANCELLED | OUTPATIENT
Start: 2021-09-20

## 2021-08-26 RX ORDER — METHYLPREDNISOLONE SODIUM SUCCINATE 125 MG/2ML
125 INJECTION, POWDER, LYOPHILIZED, FOR SOLUTION INTRAMUSCULAR; INTRAVENOUS
Status: CANCELLED
Start: 2021-09-20

## 2021-08-26 RX ORDER — DIPHENHYDRAMINE HCL 25 MG
50 CAPSULE ORAL ONCE
Status: CANCELLED | OUTPATIENT
Start: 2021-09-20

## 2021-08-26 RX ORDER — LORAZEPAM 2 MG/ML
0.5 INJECTION INTRAMUSCULAR EVERY 4 HOURS PRN
Status: CANCELLED
Start: 2021-09-20

## 2021-08-26 RX ORDER — ALBUTEROL SULFATE 0.83 MG/ML
2.5 SOLUTION RESPIRATORY (INHALATION)
Status: CANCELLED | OUTPATIENT
Start: 2021-09-13

## 2021-08-26 RX ORDER — HEPARIN SODIUM (PORCINE) LOCK FLUSH IV SOLN 100 UNIT/ML 100 UNIT/ML
5 SOLUTION INTRAVENOUS
Status: CANCELLED | OUTPATIENT
Start: 2021-09-13

## 2021-08-26 RX ORDER — ACETAMINOPHEN 325 MG/1
650 TABLET ORAL ONCE
Status: CANCELLED | OUTPATIENT
Start: 2021-09-20

## 2021-08-26 RX ORDER — MEPERIDINE HYDROCHLORIDE 25 MG/ML
25 INJECTION INTRAMUSCULAR; INTRAVENOUS; SUBCUTANEOUS EVERY 30 MIN PRN
Status: CANCELLED | OUTPATIENT
Start: 2021-09-13

## 2021-08-26 RX ORDER — HEPARIN SODIUM (PORCINE) LOCK FLUSH IV SOLN 100 UNIT/ML 100 UNIT/ML
5 SOLUTION INTRAVENOUS
Status: CANCELLED | OUTPATIENT
Start: 2021-09-20

## 2021-08-26 RX ORDER — DEXAMETHASONE 4 MG/1
20 TABLET ORAL ONCE
Status: CANCELLED | OUTPATIENT
Start: 2021-09-13

## 2021-08-26 RX ORDER — SODIUM CHLORIDE 9 MG/ML
1000 INJECTION, SOLUTION INTRAVENOUS CONTINUOUS PRN
Status: CANCELLED
Start: 2021-09-20

## 2021-08-26 RX ORDER — METHYLPREDNISOLONE SODIUM SUCCINATE 125 MG/2ML
125 INJECTION, POWDER, LYOPHILIZED, FOR SOLUTION INTRAMUSCULAR; INTRAVENOUS
Status: CANCELLED
Start: 2021-09-13

## 2021-08-26 RX ORDER — EPINEPHRINE 1 MG/ML
0.3 INJECTION, SOLUTION INTRAMUSCULAR; SUBCUTANEOUS EVERY 5 MIN PRN
Status: CANCELLED | OUTPATIENT
Start: 2021-09-20

## 2021-08-26 RX ORDER — SODIUM CHLORIDE 9 MG/ML
1000 INJECTION, SOLUTION INTRAVENOUS CONTINUOUS PRN
Status: CANCELLED
Start: 2021-09-13

## 2021-08-26 RX ORDER — NALOXONE HYDROCHLORIDE 0.4 MG/ML
.1-.4 INJECTION, SOLUTION INTRAMUSCULAR; INTRAVENOUS; SUBCUTANEOUS
Status: CANCELLED | OUTPATIENT
Start: 2021-09-20

## 2021-08-26 RX ORDER — DIPHENHYDRAMINE HYDROCHLORIDE 50 MG/ML
50 INJECTION INTRAMUSCULAR; INTRAVENOUS
Status: CANCELLED
Start: 2021-09-13

## 2021-08-26 RX ORDER — NALOXONE HYDROCHLORIDE 0.4 MG/ML
.1-.4 INJECTION, SOLUTION INTRAMUSCULAR; INTRAVENOUS; SUBCUTANEOUS
Status: CANCELLED | OUTPATIENT
Start: 2021-09-13

## 2021-08-26 RX ORDER — LORAZEPAM 2 MG/ML
0.5 INJECTION INTRAMUSCULAR EVERY 4 HOURS PRN
Status: CANCELLED
Start: 2021-09-13

## 2021-08-26 RX ORDER — ALBUTEROL SULFATE 90 UG/1
1-2 AEROSOL, METERED RESPIRATORY (INHALATION)
Status: CANCELLED
Start: 2021-09-20

## 2021-08-26 RX ORDER — HEPARIN SODIUM,PORCINE 10 UNIT/ML
5 VIAL (ML) INTRAVENOUS
Status: CANCELLED | OUTPATIENT
Start: 2021-09-13

## 2021-08-26 RX ORDER — ALBUTEROL SULFATE 0.83 MG/ML
2.5 SOLUTION RESPIRATORY (INHALATION)
Status: CANCELLED | OUTPATIENT
Start: 2021-09-20

## 2021-08-26 NOTE — TELEPHONE ENCOUNTER
PRIOR AUTHORIZATION DENIED    Medication: Darzalex Faspro- Rx benefit    Denial Date: 8/26/2021    Denial Rational: Regular Darzalex injection for infusion preferred    Appeal Information:

## 2021-08-30 ENCOUNTER — APPOINTMENT (OUTPATIENT)
Dept: LAB | Facility: CLINIC | Age: 51
End: 2021-08-30
Attending: STUDENT IN AN ORGANIZED HEALTH CARE EDUCATION/TRAINING PROGRAM
Payer: COMMERCIAL

## 2021-08-30 ENCOUNTER — INFUSION THERAPY VISIT (OUTPATIENT)
Dept: ONCOLOGY | Facility: CLINIC | Age: 51
End: 2021-08-30
Attending: NURSE PRACTITIONER
Payer: COMMERCIAL

## 2021-08-30 VITALS
HEART RATE: 88 BPM | TEMPERATURE: 98.6 F | DIASTOLIC BLOOD PRESSURE: 78 MMHG | BODY MASS INDEX: 27.12 KG/M2 | RESPIRATION RATE: 16 BRPM | SYSTOLIC BLOOD PRESSURE: 122 MMHG | WEIGHT: 163 LBS | OXYGEN SATURATION: 98 %

## 2021-08-30 DIAGNOSIS — C90.00 MULTIPLE MYELOMA NOT HAVING ACHIEVED REMISSION (H): Primary | ICD-10-CM

## 2021-08-30 LAB
ALBUMIN SERPL-MCNC: 3.5 G/DL (ref 3.4–5)
ALP SERPL-CCNC: 62 U/L (ref 40–150)
ALT SERPL W P-5'-P-CCNC: 20 U/L (ref 0–70)
ANION GAP SERPL CALCULATED.3IONS-SCNC: 9 MMOL/L (ref 3–14)
AST SERPL W P-5'-P-CCNC: 13 U/L (ref 0–45)
BASOPHILS # BLD AUTO: 0 10E3/UL (ref 0–0.2)
BASOPHILS NFR BLD AUTO: 0 %
BILIRUB SERPL-MCNC: 0.5 MG/DL (ref 0.2–1.3)
BUN SERPL-MCNC: 12 MG/DL (ref 7–30)
CALCIUM SERPL-MCNC: 8.6 MG/DL (ref 8.5–10.1)
CHLORIDE BLD-SCNC: 106 MMOL/L (ref 94–109)
CO2 SERPL-SCNC: 25 MMOL/L (ref 20–32)
CREAT SERPL-MCNC: 0.84 MG/DL (ref 0.66–1.25)
EOSINOPHIL # BLD AUTO: 0.6 10E3/UL (ref 0–0.7)
EOSINOPHIL NFR BLD AUTO: 6 %
ERYTHROCYTE [DISTWIDTH] IN BLOOD BY AUTOMATED COUNT: 16.9 % (ref 10–15)
GFR SERPL CREATININE-BSD FRML MDRD: >90 ML/MIN/1.73M2
GLUCOSE BLD-MCNC: 118 MG/DL (ref 70–99)
HCT VFR BLD AUTO: 34.6 % (ref 40–53)
HGB BLD-MCNC: 11.3 G/DL (ref 13.3–17.7)
IMM GRANULOCYTES # BLD: 0 10E3/UL
IMM GRANULOCYTES NFR BLD: 0 %
LYMPHOCYTES # BLD AUTO: 1.7 10E3/UL (ref 0.8–5.3)
LYMPHOCYTES NFR BLD AUTO: 19 %
MCH RBC QN AUTO: 30.9 PG (ref 26.5–33)
MCHC RBC AUTO-ENTMCNC: 32.7 G/DL (ref 31.5–36.5)
MCV RBC AUTO: 95 FL (ref 78–100)
MONOCYTES # BLD AUTO: 1.5 10E3/UL (ref 0–1.3)
MONOCYTES NFR BLD AUTO: 17 %
NEUTROPHILS # BLD AUTO: 5.2 10E3/UL (ref 1.6–8.3)
NEUTROPHILS NFR BLD AUTO: 58 %
NRBC # BLD AUTO: 0 10E3/UL
NRBC BLD AUTO-RTO: 0 /100
PLATELET # BLD AUTO: 369 10E3/UL (ref 150–450)
POTASSIUM BLD-SCNC: 3.3 MMOL/L (ref 3.4–5.3)
PROT SERPL-MCNC: 6.3 G/DL (ref 6.8–8.8)
RBC # BLD AUTO: 3.66 10E6/UL (ref 4.4–5.9)
SODIUM SERPL-SCNC: 140 MMOL/L (ref 133–144)
WBC # BLD AUTO: 9.1 10E3/UL (ref 4–11)

## 2021-08-30 PROCEDURE — 80053 COMPREHEN METABOLIC PANEL: CPT | Performed by: PHYSICIAN ASSISTANT

## 2021-08-30 PROCEDURE — 250N000013 HC RX MED GY IP 250 OP 250 PS 637: Performed by: NURSE PRACTITIONER

## 2021-08-30 PROCEDURE — 85025 COMPLETE CBC W/AUTO DIFF WBC: CPT | Performed by: PHYSICIAN ASSISTANT

## 2021-08-30 PROCEDURE — 96401 CHEMO ANTI-NEOPL SQ/IM: CPT

## 2021-08-30 PROCEDURE — 250N000011 HC RX IP 250 OP 636: Mod: JW | Performed by: NURSE PRACTITIONER

## 2021-08-30 PROCEDURE — 36415 COLL VENOUS BLD VENIPUNCTURE: CPT | Performed by: PHYSICIAN ASSISTANT

## 2021-08-30 RX ORDER — POTASSIUM CHLORIDE 1500 MG/1
40 TABLET, EXTENDED RELEASE ORAL ONCE
Status: COMPLETED | OUTPATIENT
Start: 2021-08-30 | End: 2021-08-30

## 2021-08-30 RX ORDER — DIPHENHYDRAMINE HCL 25 MG
50 CAPSULE ORAL ONCE
Status: COMPLETED | OUTPATIENT
Start: 2021-08-30 | End: 2021-08-30

## 2021-08-30 RX ORDER — ACETAMINOPHEN 325 MG/1
650 TABLET ORAL ONCE
Status: COMPLETED | OUTPATIENT
Start: 2021-08-30 | End: 2021-08-30

## 2021-08-30 RX ADMIN — DIPHENHYDRAMINE HYDROCHLORIDE 50 MG: 25 CAPSULE ORAL at 15:24

## 2021-08-30 RX ADMIN — BORTEZOMIB 2.5 MG: 3.5 INJECTION, POWDER, LYOPHILIZED, FOR SOLUTION INTRAVENOUS; SUBCUTANEOUS at 16:08

## 2021-08-30 RX ADMIN — ACETAMINOPHEN 650 MG: 325 TABLET ORAL at 15:24

## 2021-08-30 RX ADMIN — POTASSIUM CHLORIDE 40 MEQ: 1500 TABLET, EXTENDED RELEASE ORAL at 16:07

## 2021-08-30 ASSESSMENT — PAIN SCALES - GENERAL: PAINLEVEL: NO PAIN (0)

## 2021-08-30 NOTE — PROGRESS NOTES
BMT ONC Adult Bone Marrow Biopsy Procedure Note  August 30, 2021  /77 (BP Location: Right arm)   Pulse 84   Temp 97.4  F (36.3  C) (Tympanic)   Resp 16   Wt 73.3 kg (161 lb 11.2 oz)   SpO2 100%   BMI 26.91 kg/m       Learning needs assessment complete within 12 months? unknown    DIAGNOSIS: multiple myeloma     PROCEDURE: Unilateral Bone Marrow Biopsy and Unilateral Aspirate    LOCATION: [facility] 2nd Floor    Patient s identification was positively verified by verbal identification and invasive procedure safety checklist was completed. Informed consent was obtained. Following the administration of Midazolam as pre-medication, patient was placed in the prone position and prepped and draped in a sterile manner. Approximately 15 cc of 1% Lidocaine was used over the right posterior iliac spine. Following this a 3 mm incision was made. Trephine bone marrow core(s) was (were) obtained from the [facility]. Bone marrow aspirates were obtained from the [facility]. Aspirates were sent for morphology, immunophenotyping and cytogenetics. A total of approximately 15 ml of marrow was aspirated. Following this procedure a sterile dressing was applied to the bone marrow biopsy site(s). The patient was placed in the supine position to maintain pressure on the biopsy site. Post-procedure wound care instructions were given.     Complications: No but note: He was given 2 mg of IV versed which is appropriate for his age and weight. However he was fairly sedated from this so would recommend only 1 mg for future biopsies.     Pre-procedural pain: 0 out of 10 on the numeric pain rating scale.     Procedural pain: 4 out of 10 on the numeric pain rating scale.     Post-procedural pain assessment: 0 out of 10 on the numeric pain rating scale.     Interventions: NO    Length of procedure:21 minutes to 45 minutes    Procedure performed by: Shantell Valdez PA-C

## 2021-08-30 NOTE — PATIENT INSTRUCTIONS
Contact Numbers  [facility]: [phone] (for symptom and scheduling needs)    Please call the [facility] Triage line if you experience a temperature greater than or equal to 100.4, shaking chills, have uncontrolled nausea, vomiting and/or diarrhea, dizziness, shortness of breath, chest pain, bleeding, unexplained bruising, or if you have any other new/concerning symptoms, questions or concerns.     If you are having any concerning symptoms or wish to speak to a provider before your next infusion visit, please call your care coordinator or triage to notify them so we can adequately serve you.     If you need a refill on a narcotic prescription or other medication, please call triage before your infusion appointment.          August 2021 Sunday Monday Tuesday Wednesday Thursday Friday Saturday   1     2     3     4     5     6    LAB PERIPHERAL   2:15 PM   (15 min.)   [facility] LAB DRAW   [facility]    ONC INFUSION 1 HR (60 MIN)   3:00 PM   (60 min.)    ONC INFUSION NURSE   [facility] 7       8     9     10     11     12     13    VIDEO VISIT RETURN   2:15 PM   (45 min.)   Alba Moses APRN CNP   [facility]    VIDEO VISIT NEW   3:15 PM   (90 min.)    BMT DOM   [facility] Blood and Marrow Trans[facility] [place] 14       15     16    US LWR EXT VENOUS DUPLEX BILAT   3:15 PM   (60 min.)   UCSCUS3   [facility] 17    SOCIAL WORK TELEPHONE VISIT   2:15 PM   (90 min.)   Bella Garcia MSW   [facility] [place] 18     19     20    LAB PERIPHERAL   2:15 PM   (15 min.)   [facility]   [facility] 21       22     23    LAB PERIPHERAL   2:30 PM   (15 min.)   [facility]   [facility]    ONC INFUSION 1 HR (60 MIN)   3:00 PM   (60 min.)    ONC INFUSION NURSE    [facility] 24     25     26     27     28       29     30    LAB PERIPHERAL   2:45 PM   (15 min.)   [facility]   [facility]    ONC INFUSION 1 HR (60 MIN)   3:30 PM   (60 min.)   [facility] ON[facility] INFUSION NURSE   [facility] 31    PE EYE/TH ONCOLOGY  10:00 AM   (45 min.)   UUPET1   [facility]    UMP BONE MARROW BIOPSY   1:15 PM   (90 min.)   Shantell Valdez PA-C   [facility]                                 September 2021 Sunday Monday Tuesday Wednesday Thursday Friday Saturday                  1     2     3    COVID-19 MODERNA ADDT'L DOSE  11:30 AM   (10 min.)   OX COVID VACCINE MODERNA   [facility] [place] 4       5     6     7     8    LAB PERIPHERAL  11:15 AM   (15 min.)   [facility]   [facility]    RETURN  11:45 AM   (30 min.)   Barbi Vazquez MD   [facility] 9     10    ONC INFUSION 1 HR (60 MIN)   3:00 PM   (60 min.)   UC ONC INFUSION NURSE   [facility] 11       12     13     14     15     16     17    LAB PERIPHERAL   2:15 PM   (15 min.)   [facility]   [facility]    ONC INFUSION 1 HR (60 MIN)   3:00 PM   (60 min.)   [facility] ONC INFUSION NURSE   [facility] 18       19     20     21     22     23     24     25       26     27     28     29     30                               Lab Results:  Recent Results (from the past 12 hour(s))   Comprehensive metabolic panel    Collection Time: 08/30/21  2:56 PM   Result Value Ref Range    Sodium 140 133 - 144 mmol/L    Potassium 3.3 (L) 3.4 - 5.3 mmol/L    Chloride 106 94 - 109 mmol/L    Carbon Dioxide (CO2) 25 20 - 32 mmol/L    Anion Gap 9 3 - 14 mmol/L    Urea Nitrogen 12 7 - 30 mg/dL    Creatinine 0.84 0.66 - 1.25 mg/dL    Calcium 8.6 8.5 -  10.1 mg/dL    Glucose 118 (H) 70 - 99 mg/dL    Alkaline Phosphatase 62 40 - 150 U/L    AST 13 0 - 45 U/L    ALT 20 0 - 70 U/L    Protein Total 6.3 (L) 6.8 - 8.8 g/dL    Albumin 3.5 3.4 - 5.0 g/dL    Bilirubin Total 0.5 0.2 - 1.3 mg/dL    GFR Estimate >90 >60 mL/min/1.73m2   CBC with platelets and differential    Collection Time: 08/30/21  2:56 PM   Result Value Ref Range    WBC Count 9.1 4.0 - 11.0 10e3/uL    RBC Count 3.66 (L) 4.40 - 5.90 10e6/uL    Hemoglobin 11.3 (L) 13.3 - 17.7 g/dL    Hematocrit 34.6 (L) 40.0 - 53.0 %    MCV 95 78 - 100 fL    MCH 30.9 26.5 - 33.0 pg    MCHC 32.7 31.5 - 36.5 g/dL    RDW 16.9 (H) 10.0 - 15.0 %    Platelet Count 369 150 - 450 10e3/uL    % Neutrophils 58 %    % Lymphocytes 19 %    % Monocytes 17 %    % Eosinophils 6 %    % Basophils 0 %    % Immature Granulocytes 0 %    NRBCs per 100 WBC 0 <1 /100    Absolute Neutrophils 5.2 1.6 - 8.3 10e3/uL    Absolute Lymphocytes 1.7 0.8 - 5.3 10e3/uL    Absolute Monocytes 1.5 (H) 0.0 - 1.3 10e3/uL    Absolute Eosinophils 0.6 0.0 - 0.7 10e3/uL    Absolute Basophils 0.0 0.0 - 0.2 10e3/uL    Absolute Immature Granulocytes 0.0 <=0.0 10e3/uL    Absolute NRBCs 0.0 10e3/uL

## 2021-08-30 NOTE — PROGRESS NOTES
Infusion Nursing Note:  June Ronquillo presents today for Cycle 5 Day 8 Velcade.    Patient seen by provider today: No   present during visit today: Not Applicable.    Note: Patient presents to infusion today doing well. Denies any recent fevers, chills, shortness of breath, chest pain or cough. Is eating and drinking well. Gum bleeding has resolved. Intermittent muscle cramps continue in legs, but patient states they are much better than a few weeks ago. Offers no new concerns today. Potassium 3.3. Alba Moses NP notified.    Tylenol and benadryl given as premeds today per treatment plan as patient developed hives in the past.     TORB @ 1608 Alba Moses NP/ Flori Nelson RN:  - Replace potassium per protocol  - Instruct patient to continue to take home potassium as prescribed    Intravenous Access:  No Intravenous access at this visit.    Treatment Conditions:  Lab Results   Component Value Date    HGB 11.3 (L) 08/30/2021    WBC 9.1 08/30/2021    ANEU 4.7 07/09/2021    ANEUTAUTO 5.2 08/30/2021     08/30/2021      Lab Results   Component Value Date     08/30/2021    POTASSIUM 3.3 (L) 08/30/2021    CR 0.84 08/30/2021    HARDEEP 8.6 08/30/2021    BILITOTAL 0.5 08/30/2021    ALBUMIN 3.5 08/30/2021    ALT 20 08/30/2021    AST 13 08/30/2021     Results reviewed, labs MET treatment parameters, ok to proceed with treatment.    Post Infusion Assessment:  Patient tolerated Velcade injection without incident.  Site patent and intact, free from redness, edema or discomfort.  No evidence of extravasations.     Discharge Plan:   Patient declined prescription refills.  Discharge instructions reviewed with: Patient.  Patient and/or family verbalized understanding of discharge instructions and all questions answered.  AVS to patient via [employer].  Patient will return tomorrow 8/31 for PET scan and BMBx and 9/8 and for next appointment with Dr. Vazquez.   Patient discharged in stable condition  accompanied by: self.  Departure Mode: Ambulatory.      Flori eNlson RN

## 2021-08-31 ENCOUNTER — OFFICE VISIT (OUTPATIENT)
Dept: ONCOLOGY | Facility: CLINIC | Age: 51
End: 2021-08-31
Attending: PHYSICIAN ASSISTANT
Payer: COMMERCIAL

## 2021-08-31 ENCOUNTER — HOSPITAL ENCOUNTER (OUTPATIENT)
Dept: PET IMAGING | Facility: CLINIC | Age: 51
End: 2021-08-31
Attending: STUDENT IN AN ORGANIZED HEALTH CARE EDUCATION/TRAINING PROGRAM
Payer: COMMERCIAL

## 2021-08-31 VITALS
RESPIRATION RATE: 16 BRPM | HEART RATE: 84 BPM | DIASTOLIC BLOOD PRESSURE: 77 MMHG | SYSTOLIC BLOOD PRESSURE: 117 MMHG | WEIGHT: 161.7 LBS | BODY MASS INDEX: 26.91 KG/M2 | TEMPERATURE: 97.4 F | OXYGEN SATURATION: 100 %

## 2021-08-31 DIAGNOSIS — C90.00 MULTIPLE MYELOMA NOT HAVING ACHIEVED REMISSION (H): ICD-10-CM

## 2021-08-31 DIAGNOSIS — C90.00 MULTIPLE MYELOMA NOT HAVING ACHIEVED REMISSION (H): Primary | ICD-10-CM

## 2021-08-31 DIAGNOSIS — Z79.899 ENCOUNTER FOR LONG-TERM (CURRENT) USE OF MEDICATIONS: ICD-10-CM

## 2021-08-31 LAB
BASOPHILS # BLD AUTO: 0 10E3/UL (ref 0–0.2)
BASOPHILS NFR BLD AUTO: 0 %
EOSINOPHIL # BLD AUTO: 0.6 10E3/UL (ref 0–0.7)
EOSINOPHIL NFR BLD AUTO: 5 %
ERYTHROCYTE [DISTWIDTH] IN BLOOD BY AUTOMATED COUNT: 17 % (ref 10–15)
HCT VFR BLD AUTO: 36.4 % (ref 40–53)
HGB BLD-MCNC: 11.8 G/DL (ref 13.3–17.7)
IMM GRANULOCYTES # BLD: 0.1 10E3/UL
IMM GRANULOCYTES NFR BLD: 1 %
LYMPHOCYTES # BLD AUTO: 1.5 10E3/UL (ref 0.8–5.3)
LYMPHOCYTES NFR BLD AUTO: 12 %
MCH RBC QN AUTO: 30.6 PG (ref 26.5–33)
MCHC RBC AUTO-ENTMCNC: 32.4 G/DL (ref 31.5–36.5)
MCV RBC AUTO: 94 FL (ref 78–100)
MONOCYTES # BLD AUTO: 1.5 10E3/UL (ref 0–1.3)
MONOCYTES NFR BLD AUTO: 12 %
NEUTROPHILS # BLD AUTO: 8.9 10E3/UL (ref 1.6–8.3)
NEUTROPHILS NFR BLD AUTO: 70 %
NRBC # BLD AUTO: 0 10E3/UL
NRBC BLD AUTO-RTO: 0 /100
PLATELET # BLD AUTO: 352 10E3/UL (ref 150–450)
RBC # BLD AUTO: 3.86 10E6/UL (ref 4.4–5.9)
WBC # BLD AUTO: 12.6 10E3/UL (ref 4–11)

## 2021-08-31 PROCEDURE — 88305 TISSUE EXAM BY PATHOLOGIST: CPT | Mod: 26 | Performed by: PATHOLOGY

## 2021-08-31 PROCEDURE — 96374 THER/PROPH/DIAG INJ IV PUSH: CPT | Mod: 59

## 2021-08-31 PROCEDURE — 88368 INSITU HYBRIDIZATION MANUAL: CPT | Mod: 26 | Performed by: MEDICAL GENETICS

## 2021-08-31 PROCEDURE — 88311 DECALCIFY TISSUE: CPT | Mod: 26 | Performed by: PATHOLOGY

## 2021-08-31 PROCEDURE — 36415 COLL VENOUS BLD VENIPUNCTURE: CPT | Performed by: PHYSICIAN ASSISTANT

## 2021-08-31 PROCEDURE — 74177 CT ABD & PELVIS W/CONTRAST: CPT

## 2021-08-31 PROCEDURE — 38222 DX BONE MARROW BX & ASPIR: CPT | Performed by: PHYSICIAN ASSISTANT

## 2021-08-31 PROCEDURE — 88341 IMHCHEM/IMCYTCHM EA ADD ANTB: CPT | Mod: 26 | Performed by: PATHOLOGY

## 2021-08-31 PROCEDURE — 88237 TISSUE CULTURE BONE MARROW: CPT | Performed by: PHYSICIAN ASSISTANT

## 2021-08-31 PROCEDURE — 88188 FLOWCYTOMETRY/READ 9-15: CPT | Performed by: PATHOLOGY

## 2021-08-31 PROCEDURE — 88342 IMHCHEM/IMCYTCHM 1ST ANTB: CPT | Mod: 26 | Performed by: PATHOLOGY

## 2021-08-31 PROCEDURE — 85025 COMPLETE CBC W/AUTO DIFF WBC: CPT | Performed by: PHYSICIAN ASSISTANT

## 2021-08-31 PROCEDURE — 74177 CT ABD & PELVIS W/CONTRAST: CPT | Mod: 26

## 2021-08-31 PROCEDURE — 88275 CYTOGENETICS 100-300: CPT | Performed by: PHYSICIAN ASSISTANT

## 2021-08-31 PROCEDURE — 88342 IMHCHEM/IMCYTCHM 1ST ANTB: CPT | Mod: TC,XU | Performed by: PHYSICIAN ASSISTANT

## 2021-08-31 PROCEDURE — 71260 CT THORAX DX C+: CPT | Mod: 26

## 2021-08-31 PROCEDURE — 78816 PET IMAGE W/CT FULL BODY: CPT | Mod: PS

## 2021-08-31 PROCEDURE — 250N000011 HC RX IP 250 OP 636: Performed by: STUDENT IN AN ORGANIZED HEALTH CARE EDUCATION/TRAINING PROGRAM

## 2021-08-31 PROCEDURE — A9552 F18 FDG: HCPCS | Performed by: STUDENT IN AN ORGANIZED HEALTH CARE EDUCATION/TRAINING PROGRAM

## 2021-08-31 PROCEDURE — 88311 DECALCIFY TISSUE: CPT | Mod: TC | Performed by: PHYSICIAN ASSISTANT

## 2021-08-31 PROCEDURE — 85060 BLOOD SMEAR INTERPRETATION: CPT | Performed by: PATHOLOGY

## 2021-08-31 PROCEDURE — 88369 M/PHMTRC ALYSISHQUANT/SEMIQ: CPT | Mod: 26 | Performed by: MEDICAL GENETICS

## 2021-08-31 PROCEDURE — 88184 FLOWCYTOMETRY/ TC 1 MARKER: CPT | Performed by: PHYSICIAN ASSISTANT

## 2021-08-31 PROCEDURE — 88313 SPECIAL STAINS GROUP 2: CPT | Mod: 26 | Performed by: PATHOLOGY

## 2021-08-31 PROCEDURE — 85097 BONE MARROW INTERPRETATION: CPT | Performed by: PATHOLOGY

## 2021-08-31 PROCEDURE — 343N000001 HC RX 343: Performed by: STUDENT IN AN ORGANIZED HEALTH CARE EDUCATION/TRAINING PROGRAM

## 2021-08-31 PROCEDURE — 250N000011 HC RX IP 250 OP 636: Performed by: PHYSICIAN ASSISTANT

## 2021-08-31 PROCEDURE — 88185 FLOWCYTOMETRY/TC ADD-ON: CPT | Performed by: PHYSICIAN ASSISTANT

## 2021-08-31 PROCEDURE — 78816 PET IMAGE W/CT FULL BODY: CPT | Mod: 26

## 2021-08-31 PROCEDURE — 88271 CYTOGENETICS DNA PROBE: CPT | Performed by: PHYSICIAN ASSISTANT

## 2021-08-31 RX ORDER — LENALIDOMIDE 25 MG/1
25 CAPSULE ORAL DAILY
Qty: 14 CAPSULE | Refills: 0 | Status: SHIPPED | OUTPATIENT
Start: 2021-08-31 | End: 2021-09-20

## 2021-08-31 RX ORDER — IOPAMIDOL 755 MG/ML
45-135 INJECTION, SOLUTION INTRAVASCULAR ONCE
Status: COMPLETED | OUTPATIENT
Start: 2021-08-31 | End: 2021-08-31

## 2021-08-31 RX ADMIN — MIDAZOLAM HYDROCHLORIDE 2 MG: 1 INJECTION, SOLUTION INTRAMUSCULAR; INTRAVENOUS at 13:53

## 2021-08-31 RX ADMIN — IOPAMIDOL 100 ML: 755 INJECTION, SOLUTION INTRAVENOUS at 11:31

## 2021-08-31 RX ADMIN — FLUDEOXYGLUCOSE F-18 10.29 MCI.: 500 INJECTION, SOLUTION INTRAVENOUS at 10:26

## 2021-08-31 ASSESSMENT — PAIN SCALES - GENERAL: PAINLEVEL: NO PAIN (0)

## 2021-08-31 NOTE — LETTER
Date:November 20, 2021      Provider requested that no letter be sent. Do not send.       [facility]

## 2021-08-31 NOTE — NURSING NOTE
Patient supine for 30 minutes following biopsy. After 30 minutes, dressing clean, dry and intact. Vital signs stable. See DOC flow sheets for details. Left ambulatory with family member (wife).  Kristen Vicente RN

## 2021-08-31 NOTE — NURSING NOTE
"Oncology Rooming Note    August 31, 2021 2:12 PM   June Ronquillo is a 51 year old male who presents for:    Chief Complaint   Patient presents with     Bone Marrow Biopsy     pt here for bmbx. history of multiple myeloma.     Initial Vitals: /86 (BP Location: Right arm, Patient Position: Sitting, Cuff Size: Adult Regular)   Pulse 98   Temp 97.4  F (36.3  C) (Tympanic)   Resp 16   Wt 73.3 kg (161 lb 11.2 oz)   SpO2 98%   BMI 26.91 kg/m   Estimated body mass index is 26.91 kg/m  as calculated from the following:    Height as of 5/27/21: 1.651 m (5' 5\").    Weight as of this encounter: 73.3 kg (161 lb 11.2 oz). Body surface area is 1.83 meters squared.  No Pain (0) Comment: Data Unavailable   No LMP for male patient.  Allergies reviewed: Yes  Medications reviewed: Yes    Medications: Medication refills not needed today.  Pharmacy name entered into [employer]:    [facility] [place], [place] - [address]  [facility] [place], [place] - [address]    Clinical concerns: none       Keily Vicente RN              "

## 2021-08-31 NOTE — LETTER
8/31/2021         RE: June Ronquillo  [address]  [facility] [address]        Dear Colleague,    Thank you for referring your patient, June Ronquillo, to the [facility]. Please see a copy of my visit note below.    BMT ONC Adult Bone Marrow Biopsy Procedure Note  August 30, 2021  /77 (BP Location: Right arm)   Pulse 84   Temp 97.4  F (36.3  C) (Tympanic)   Resp 16   Wt 73.3 kg (161 lb 11.2 oz)   SpO2 100%   BMI 26.91 kg/m       Learning needs assessment complete within 12 months? unknown    DIAGNOSIS: multiple myeloma     PROCEDURE: Unilateral Bone Marrow Biopsy and Unilateral Aspirate    LOCATION[place] [facility] [place] [facility]    Patient s identification was positively verified by verbal identification and invasive procedure safety checklist was completed. Informed consent was obtained. Following the administration of Midazolam as pre-medication, patient was placed in the prone position and prepped and draped in a sterile manner. Approximately 15 cc of 1% Lidocaine was used over the right posterior iliac spine. Following this a 3 mm incision was made. Trephine bone marrow core(s) was (were) obtained from the [facility]. Bone marrow aspirates were obtained from the [facility]. Aspirates were sent for morphology, immunophenotyping and cytogenetics. A total of approximately 15 ml of marrow was aspirated. Following this procedure a sterile dressing was applied to the bone marrow biopsy site(s). The patient was placed in the supine position to maintain pressure on the biopsy site. Post-procedure wound care instructions were given.     Complications: No but note: He was given 2 mg of IV versed which is appropriate for his age and weight. However he was fairly sedated from this so would recommend only 1 mg for future biopsies.     Pre-procedural pain: 0 out of 10 on the numeric pain rating scale.     Procedural pain: 4 out of 10 on the numeric pain rating scale.     Post-procedural  pain assessment: 0 out of 10 on the numeric pain rating scale.     Interventions: NO    Length of procedure:21 minutes to 45 minutes    Procedure performed by: Shantell Valdez PA-C         Again, thank you for allowing me to participate in the care of your patient.        Sincerely,        Shantell Valdez PA-C

## 2021-09-01 LAB
PATH REPORT.COMMENTS IMP SPEC: NORMAL
PATH REPORT.FINAL DX SPEC: NORMAL
PATH REPORT.FINAL DX SPEC: NORMAL
PATH REPORT.GROSS SPEC: NORMAL
PATH REPORT.MICROSCOPIC SPEC OTHER STN: NORMAL
PATH REPORT.RELEVANT HX SPEC: NORMAL
PATH REPORT.RELEVANT HX SPEC: NORMAL

## 2021-09-03 ENCOUNTER — IMMUNIZATION (OUTPATIENT)
Dept: NURSING | Facility: CLINIC | Age: 51
End: 2021-09-03
Payer: COMMERCIAL

## 2021-09-03 PROCEDURE — 0013A PR COVID VAC MODERNA 100 MCG/0.5 ML IM: CPT

## 2021-09-03 PROCEDURE — 91301 PR COVID VAC MODERNA 100 MCG/0.5 ML IM: CPT

## 2021-09-05 ENCOUNTER — HEALTH MAINTENANCE LETTER (OUTPATIENT)
Age: 51
End: 2021-09-05

## 2021-09-08 ENCOUNTER — ONCOLOGY VISIT (OUTPATIENT)
Dept: ONCOLOGY | Facility: CLINIC | Age: 51
End: 2021-09-08
Attending: STUDENT IN AN ORGANIZED HEALTH CARE EDUCATION/TRAINING PROGRAM
Payer: COMMERCIAL

## 2021-09-08 ENCOUNTER — APPOINTMENT (OUTPATIENT)
Dept: LAB | Facility: CLINIC | Age: 51
End: 2021-09-08
Payer: COMMERCIAL

## 2021-09-08 VITALS
BODY MASS INDEX: 27.86 KG/M2 | DIASTOLIC BLOOD PRESSURE: 66 MMHG | HEIGHT: 65 IN | TEMPERATURE: 98.4 F | RESPIRATION RATE: 16 BRPM | HEART RATE: 76 BPM | WEIGHT: 167.2 LBS | OXYGEN SATURATION: 99 % | SYSTOLIC BLOOD PRESSURE: 133 MMHG

## 2021-09-08 DIAGNOSIS — Z79.899 ENCOUNTER FOR LONG-TERM (CURRENT) USE OF MEDICATIONS: ICD-10-CM

## 2021-09-08 DIAGNOSIS — C90.00 MULTIPLE MYELOMA NOT HAVING ACHIEVED REMISSION (H): Primary | ICD-10-CM

## 2021-09-08 LAB
ALBUMIN SERPL-MCNC: 3.5 G/DL (ref 3.4–5)
ALP SERPL-CCNC: 62 U/L (ref 40–150)
ALT SERPL W P-5'-P-CCNC: 20 U/L (ref 0–70)
ANION GAP SERPL CALCULATED.3IONS-SCNC: 6 MMOL/L (ref 3–14)
AST SERPL W P-5'-P-CCNC: 17 U/L (ref 0–45)
BASOPHILS # BLD AUTO: 0.1 10E3/UL (ref 0–0.2)
BASOPHILS NFR BLD AUTO: 1 %
BILIRUB SERPL-MCNC: 0.4 MG/DL (ref 0.2–1.3)
BUN SERPL-MCNC: 10 MG/DL (ref 7–30)
CALCIUM SERPL-MCNC: 9 MG/DL (ref 8.5–10.1)
CHLORIDE BLD-SCNC: 108 MMOL/L (ref 94–109)
CO2 SERPL-SCNC: 27 MMOL/L (ref 20–32)
CREAT SERPL-MCNC: 0.97 MG/DL (ref 0.66–1.25)
EOSINOPHIL # BLD AUTO: 0.7 10E3/UL (ref 0–0.7)
EOSINOPHIL NFR BLD AUTO: 7 %
ERYTHROCYTE [DISTWIDTH] IN BLOOD BY AUTOMATED COUNT: 17 % (ref 10–15)
GFR SERPL CREATININE-BSD FRML MDRD: 89 ML/MIN/1.73M2
GLUCOSE BLD-MCNC: 104 MG/DL (ref 70–99)
HCT VFR BLD AUTO: 31.6 % (ref 40–53)
HGB BLD-MCNC: 10.8 G/DL (ref 13.3–17.7)
IMM GRANULOCYTES # BLD: 0 10E3/UL
IMM GRANULOCYTES NFR BLD: 0 %
LYMPHOCYTES # BLD AUTO: 2.1 10E3/UL (ref 0.8–5.3)
LYMPHOCYTES NFR BLD AUTO: 22 %
MCH RBC QN AUTO: 31.4 PG (ref 26.5–33)
MCHC RBC AUTO-ENTMCNC: 34.2 G/DL (ref 31.5–36.5)
MCV RBC AUTO: 92 FL (ref 78–100)
MONOCYTES # BLD AUTO: 1.2 10E3/UL (ref 0–1.3)
MONOCYTES NFR BLD AUTO: 12 %
NEUTROPHILS # BLD AUTO: 5.8 10E3/UL (ref 1.6–8.3)
NEUTROPHILS NFR BLD AUTO: 58 %
NRBC # BLD AUTO: 0 10E3/UL
NRBC BLD AUTO-RTO: 0 /100
PLATELET # BLD AUTO: 349 10E3/UL (ref 150–450)
POTASSIUM BLD-SCNC: 4.2 MMOL/L (ref 3.4–5.3)
PROT SERPL-MCNC: 6.3 G/DL (ref 6.8–8.8)
RBC # BLD AUTO: 3.44 10E6/UL (ref 4.4–5.9)
SODIUM SERPL-SCNC: 141 MMOL/L (ref 133–144)
TOTAL PROTEIN SERUM FOR ELP: 6.3 G/DL (ref 6.8–8.8)
WBC # BLD AUTO: 9.8 10E3/UL (ref 4–11)

## 2021-09-08 PROCEDURE — 82040 ASSAY OF SERUM ALBUMIN: CPT | Performed by: STUDENT IN AN ORGANIZED HEALTH CARE EDUCATION/TRAINING PROGRAM

## 2021-09-08 PROCEDURE — G0463 HOSPITAL OUTPT CLINIC VISIT: HCPCS

## 2021-09-08 PROCEDURE — 86334 IMMUNOFIX E-PHORESIS SERUM: CPT | Mod: TC | Performed by: STUDENT IN AN ORGANIZED HEALTH CARE EDUCATION/TRAINING PROGRAM

## 2021-09-08 PROCEDURE — 86334 IMMUNOFIX E-PHORESIS SERUM: CPT | Mod: 26 | Performed by: STUDENT IN AN ORGANIZED HEALTH CARE EDUCATION/TRAINING PROGRAM

## 2021-09-08 PROCEDURE — 99214 OFFICE O/P EST MOD 30 MIN: CPT | Performed by: STUDENT IN AN ORGANIZED HEALTH CARE EDUCATION/TRAINING PROGRAM

## 2021-09-08 PROCEDURE — 85025 COMPLETE CBC W/AUTO DIFF WBC: CPT | Performed by: STUDENT IN AN ORGANIZED HEALTH CARE EDUCATION/TRAINING PROGRAM

## 2021-09-08 PROCEDURE — 84165 PROTEIN E-PHORESIS SERUM: CPT | Mod: TC | Performed by: PATHOLOGY

## 2021-09-08 PROCEDURE — 84165 PROTEIN E-PHORESIS SERUM: CPT | Mod: 26 | Performed by: PATHOLOGY

## 2021-09-08 PROCEDURE — 84155 ASSAY OF PROTEIN SERUM: CPT | Performed by: STUDENT IN AN ORGANIZED HEALTH CARE EDUCATION/TRAINING PROGRAM

## 2021-09-08 PROCEDURE — 36415 COLL VENOUS BLD VENIPUNCTURE: CPT | Performed by: STUDENT IN AN ORGANIZED HEALTH CARE EDUCATION/TRAINING PROGRAM

## 2021-09-08 PROCEDURE — 83883 ASSAY NEPHELOMETRY NOT SPEC: CPT | Performed by: STUDENT IN AN ORGANIZED HEALTH CARE EDUCATION/TRAINING PROGRAM

## 2021-09-08 RX ORDER — ZOLEDRONIC ACID 0.04 MG/ML
4 INJECTION, SOLUTION INTRAVENOUS ONCE
Status: CANCELLED
Start: 2021-09-10 | End: 2021-09-10

## 2021-09-08 RX ORDER — HEPARIN SODIUM (PORCINE) LOCK FLUSH IV SOLN 100 UNIT/ML 100 UNIT/ML
5 SOLUTION INTRAVENOUS
Status: CANCELLED | OUTPATIENT
Start: 2021-09-10

## 2021-09-08 RX ORDER — HEPARIN SODIUM,PORCINE 10 UNIT/ML
5 VIAL (ML) INTRAVENOUS
Status: CANCELLED | OUTPATIENT
Start: 2021-09-10

## 2021-09-08 ASSESSMENT — MIFFLIN-ST. JEOR: SCORE: 1530.29

## 2021-09-08 ASSESSMENT — PAIN SCALES - GENERAL: PAINLEVEL: MILD PAIN (3)

## 2021-09-08 NOTE — LETTER
9/8/2021         RE: June Ronquillo  [address]  [facility] [address]        Dear Colleague,    Thank you for referring your patient, June Ronquillo, to the [facility]. Please see a copy of my visit note below.      Oncologic Hx:   - 4/30/2021 M spike of 34.8 in urine.M spike in blood 0.2; IgG 702 with depressed IgA and IgM. Beta 2 microglobulin 2.7. Lambda  with K/L ratio of 0.01. WBC 11.1 with absolute eos of 1.0. hgb, plt, Cr (1.1), and albumin WNL.    -4/30/2021 CT abd/pelvis showed sclerotic marrow changes with numerous lytic appearing lesions throughout the imaged portions of the spine, pelvis and ribs.      -PET scan 5/11/2021 Numerous osteolytic lesions which predominantly demonstrate uptake below liver background levels. There is one intraosseous lesion in the left lateral 9th rib that demonstrates uptake above background, possibly due to nondisplaced fracture. Adjacent to this lesion is mild  hypermetabolism to the left pleura, with new small left pleural effusion, suspicious for malignant effusion versus posttraumatic effusion. Pleural uptake may represent extramedullary myeloma extending along the intercostal space versus inflammation.    - BM bx 5/17/2021 with flow showing 4.0% plasma cells which express CD19 (dim), CD38 (bright), CD45 (dim), , and monotypic cytoplasmic lambda immunoglobulin light chains but lack CD20 and CD56.  Hypercellular bone marrow for age (approximately 75% cellularity) with adequate trilineage hematopoiesis. Plasma cell infiltrate: Interstitial, lambda monotypic and representing approximately 60% the bone marrow cellularity, by  immunohistochemical stain. Cytpgenetics with 2 related hypodiploid clones. One with loss of Y, monosomy 13 and 14 (5%) and one with translocation of 8p and 14, derivative 16 with long arm replaced by extra copy 1q,monosomy 21. FISH showed gain of 1q, loss of 13 and 14, IGH-MYC fusion  "t(8:14)    - Started Vidya-RVd 21 day with velcade on days 1,8,15.     -Cycle 2 Vidya-RVd. Dose reduce dex to 80 mg d/t fatigue and stomach upset on dex days. Also having cough with basilar streaking on CXR    - 8/31/2021 BM bx -rare suspicious plasma cells in touch imprint. No increase in plasma cells by morph.    -8/31/2021 PET/CT Diffuse osteolytic lesions throughout the axial and appendicular skeleton. Increased sclerosis since prior exam 5/11/2021 without increased metabolism or size.  Hypermetabolic pretracheal lymph node as well as hypermetabolic level 2 lymph nodes within the right neck. These lymph nodes are likely reactive from autoimmune activation via medical therapy. Hypermetabolic subcentimeter nodules within the thyroid gland    Interval Hx: Logan is feeling well today. He had a cough at our last visit and some nasal congestion but these have abated. His M spike continues to fall and his PET is showing improvement in his lytic lesions. He has been seen by BMT and wants to proceed with transplant but thinks he would like to finish the work on his house first.       A comprehensive review of systems was completed and negative except as described above.       Physical Exam:   /66   Pulse 76   Temp 98.4  F (36.9  C) (Oral)   Resp 16   Ht 1.651 m (5' 5\")   Wt 75.8 kg (167 lb 3.2 oz)   SpO2 99%   BMI 27.82 kg/m    Constitutional: NAD  Eyes: no scleral icterus  ENT: no oral lesions  Lymph: no cervical, supraclavicular LAD  Pulm: CTAB  CV: RRR, no m/r/g  GI: bowel sounds present, soft and nontender to palpation  MSK: No edema in the extremities  Neuro: alert, conversant, normal gait  Psych: appropriate mood and affect      Assessment and Plan  Logan has multiple myeloma with high risk cytogenetics. Now s/p cycle 5 of vidya-RVd. He is in VGPR based on my review of his PET images, bm bx results and blood work. The pt was wanting to wait for bone marrow bx but I discussed the case with Dr. Torres and he " would then need repeat PET and BM bx so they will discuss timing with him. If he chooses to wait, will continue with cycle 6 of vidya-RVd.    Levofloxacin 250 mg every day,  mg every day, and acyclovir 400 mg BID for prophylaxis      Left nondisplaced rib fracture:  Change Vit D to Calcium/Vit D supplement. Dental work now complete so will start zometa.       Barbi Vazquez MD PhD      Again, thank you for allowing me to participate in the care of your patient.        Sincerely,        Barbi Vazquez MD

## 2021-09-08 NOTE — PROGRESS NOTES
Oncologic Hx:   - 4/30/2021 M spike of 34.8 in urine.M spike in blood 0.2; IgG 702 with depressed IgA and IgM. Beta 2 microglobulin 2.7. Lambda  with K/L ratio of 0.01. WBC 11.1 with absolute eos of 1.0. hgb, plt, Cr (1.1), and albumin WNL.    -4/30/2021 CT abd/pelvis showed sclerotic marrow changes with numerous lytic appearing lesions throughout the imaged portions of the spine, pelvis and ribs.      -PET scan 5/11/2021 Numerous osteolytic lesions which predominantly demonstrate uptake below liver background levels. There is one intraosseous lesion in the left lateral 9th rib that demonstrates uptake above background, possibly due to nondisplaced fracture. Adjacent to this lesion is mild  hypermetabolism to the left pleura, with new small left pleural effusion, suspicious for malignant effusion versus posttraumatic effusion. Pleural uptake may represent extramedullary myeloma extending along the intercostal space versus inflammation.    - BM bx 5/17/2021 with flow showing 4.0% plasma cells which express CD19 (dim), CD38 (bright), CD45 (dim), , and monotypic cytoplasmic lambda immunoglobulin light chains but lack CD20 and CD56.  Hypercellular bone marrow for age (approximately 75% cellularity) with adequate trilineage hematopoiesis. Plasma cell infiltrate: Interstitial, lambda monotypic and representing approximately 60% the bone marrow cellularity, by  immunohistochemical stain. Cytpgenetics with 2 related hypodiploid clones. One with loss of Y, monosomy 13 and 14 (5%) and one with translocation of 8p and 14, derivative 16 with long arm replaced by extra copy 1q,monosomy 21. FISH showed gain of 1q, loss of 13 and 14, IGH-MYC fusion t(8:14)    - Started Miracle-RVd 21 day with velcade on days 1,8,15.     -Cycle 2 Miracle-RVd. Dose reduce dex to 80 mg d/t fatigue and stomach upset on dex days. Also having cough with basilar streaking on CXR    - 8/31/2021 BM bx -rare suspicious plasma cells in touch  "imprint. No increase in plasma cells by morph.    -8/31/2021 PET/CT Diffuse osteolytic lesions throughout the axial and appendicular skeleton. Increased sclerosis since prior exam 5/11/2021 without increased metabolism or size.  Hypermetabolic pretracheal lymph node as well as hypermetabolic level 2 lymph nodes within the right neck. These lymph nodes are likely reactive from autoimmune activation via medical therapy. Hypermetabolic subcentimeter nodules within the thyroid gland    Interval Hx: Logan is feeling well today. He had a cough at our last visit and some nasal congestion but these have abated. His M spike continues to fall and his PET is showing improvement in his lytic lesions. He has been seen by BMT and wants to proceed with transplant but thinks he would like to finish the work on his house first.       A comprehensive review of systems was completed and negative except as described above.       Physical Exam:   /66   Pulse 76   Temp 98.4  F (36.9  C) (Oral)   Resp 16   Ht 1.651 m (5' 5\")   Wt 75.8 kg (167 lb 3.2 oz)   SpO2 99%   BMI 27.82 kg/m    Constitutional: NAD  Eyes: no scleral icterus  ENT: no oral lesions  Lymph: no cervical, supraclavicular LAD  Pulm: CTAB  CV: RRR, no m/r/g  GI: bowel sounds present, soft and nontender to palpation  MSK: No edema in the extremities  Neuro: alert, conversant, normal gait  Psych: appropriate mood and affect      Assessment and Plan  Logan has multiple myeloma with high risk cytogenetics. Now s/p cycle 5 of vidya-RVd. He is in VGPR based on my review of his PET images, bm bx results and blood work. The pt was wanting to wait for bone marrow bx but I discussed the case with Dr. Torres and he would then need repeat PET and BM bx so they will discuss timing with him. If he chooses to wait, will continue with cycle 6 of vidya-RVd.    Levofloxacin 250 mg every day,  mg every day, and acyclovir 400 mg BID for prophylaxis      Left nondisplaced rib " fracture:  Change Vit D to Calcium/Vit D supplement. Dental work now complete so will start zometa.       Barbi Vazquez MD PhD

## 2021-09-08 NOTE — NURSING NOTE
"Oncology Rooming Note    September 8, 2021 12:23 PM   June Ronquillo is a 51 year old male who presents for:    Chief Complaint   Patient presents with     Labs Only     labs drawn via , vitals completed     Oncology Clinic Visit     Return: Multiple myeloma     Initial Vitals: /66   Pulse 76   Temp 98.4  F (36.9  C) (Oral)   Resp 16   Ht 1.651 m (5' 5\")   Wt 75.8 kg (167 lb 3.2 oz)   SpO2 99%   BMI 27.82 kg/m   Estimated body mass index is 27.82 kg/m  as calculated from the following:    Height as of this encounter: 1.651 m (5' 5\").    Weight as of this encounter: 75.8 kg (167 lb 3.2 oz). Body surface area is 1.86 meters squared.  Mild Pain (3) Comment: Data Unavailable   No LMP for male patient.  Allergies reviewed: Yes  Medications reviewed: Yes    Medications: Medication refills not needed today.  Pharmacy name entered into nanoMR:    [facility] [place], [place] - [address] [facility] [place], [place] - [address] [place] [address]    Clinical concerns: N/A      Ashley Avila CMA              "

## 2021-09-09 LAB
ALBUMIN SERPL ELPH-MCNC: 4.1 G/DL (ref 3.7–5.1)
ALPHA1 GLOB SERPL ELPH-MCNC: 0.3 G/DL (ref 0.2–0.4)
ALPHA2 GLOB SERPL ELPH-MCNC: 0.7 G/DL (ref 0.5–0.9)
B-GLOBULIN SERPL ELPH-MCNC: 0.7 G/DL (ref 0.6–1)
GAMMA GLOB SERPL ELPH-MCNC: 0.5 G/DL (ref 0.7–1.6)
KAPPA LC FREE SER-MCNC: 0.94 MG/DL (ref 0.33–1.94)
KAPPA LC FREE/LAMBDA FREE SER NEPH: 1.02 {RATIO} (ref 0.26–1.65)
LAMBDA LC FREE SERPL-MCNC: 0.92 MG/DL (ref 0.57–2.63)
M PROTEIN SERPL ELPH-MCNC: 0.1 G/DL
PROT PATTERN SERPL ELPH-IMP: ABNORMAL
PROT PATTERN SERPL IFE-IMP: NORMAL

## 2021-09-10 ENCOUNTER — INFUSION THERAPY VISIT (OUTPATIENT)
Dept: ONCOLOGY | Facility: CLINIC | Age: 51
End: 2021-09-10
Payer: COMMERCIAL

## 2021-09-10 VITALS
HEART RATE: 84 BPM | RESPIRATION RATE: 16 BRPM | SYSTOLIC BLOOD PRESSURE: 129 MMHG | TEMPERATURE: 98.1 F | OXYGEN SATURATION: 99 % | DIASTOLIC BLOOD PRESSURE: 77 MMHG

## 2021-09-10 DIAGNOSIS — C90.00 MULTIPLE MYELOMA NOT HAVING ACHIEVED REMISSION (H): Primary | ICD-10-CM

## 2021-09-10 PROCEDURE — 96401 CHEMO ANTI-NEOPL SQ/IM: CPT

## 2021-09-10 PROCEDURE — 250N000012 HC RX MED GY IP 250 OP 636 PS 637: Performed by: STUDENT IN AN ORGANIZED HEALTH CARE EDUCATION/TRAINING PROGRAM

## 2021-09-10 PROCEDURE — 250N000011 HC RX IP 250 OP 636: Mod: JW | Performed by: STUDENT IN AN ORGANIZED HEALTH CARE EDUCATION/TRAINING PROGRAM

## 2021-09-10 PROCEDURE — 250N000013 HC RX MED GY IP 250 OP 250 PS 637: Performed by: STUDENT IN AN ORGANIZED HEALTH CARE EDUCATION/TRAINING PROGRAM

## 2021-09-10 RX ORDER — ACETAMINOPHEN 325 MG/1
650 TABLET ORAL ONCE
Status: COMPLETED | OUTPATIENT
Start: 2021-09-10 | End: 2021-09-10

## 2021-09-10 RX ORDER — DEXAMETHASONE 4 MG/1
20 TABLET ORAL ONCE
Status: COMPLETED | OUTPATIENT
Start: 2021-09-10 | End: 2021-09-10

## 2021-09-10 RX ORDER — DIPHENHYDRAMINE HCL 25 MG
50 CAPSULE ORAL ONCE
Status: COMPLETED | OUTPATIENT
Start: 2021-09-10 | End: 2021-09-10

## 2021-09-10 RX ADMIN — DIPHENHYDRAMINE HYDROCHLORIDE 50 MG: 25 CAPSULE ORAL at 15:16

## 2021-09-10 RX ADMIN — ACETAMINOPHEN 650 MG: 325 TABLET ORAL at 15:17

## 2021-09-10 RX ADMIN — DEXAMETHASONE 20 MG: 4 TABLET ORAL at 15:17

## 2021-09-10 RX ADMIN — BORTEZOMIB 2.5 MG: 3.5 INJECTION, POWDER, LYOPHILIZED, FOR SOLUTION INTRAVENOUS; SUBCUTANEOUS at 16:04

## 2021-09-10 RX ADMIN — DARATUMUMAB AND HYALURONIDASE-FIHJ (HUMAN RECOMBINANT) 1800 MG: 1800; 30000 INJECTION SUBCUTANEOUS at 16:08

## 2021-09-10 ASSESSMENT — PAIN SCALES - GENERAL: PAINLEVEL: MILD PAIN (2)

## 2021-09-10 NOTE — PROGRESS NOTES
Infusion Nursing Note:  June Ronquillo presents today for Day 1 Cycle 6 Velcade, Darzalex faspro.    Patient seen by provider : Yes: Dr. Vazquez  Date of visit: 9/8/21      present during visit today: Not Applicable.    Note: Logan arrives to infusion today doing well. He offers no changes or concerns since his visit with Dr. Vazquez. He plans to start Zometa next week.     Treatment Conditions:  Lab Results   Component Value Date    HGB 10.8 (L) 09/08/2021    WBC 9.8 09/08/2021    ANEU 4.7 07/09/2021    ANEUTAUTO 5.8 09/08/2021     09/08/2021      Results reviewed, labs MET treatment parameters, ok to proceed with treatment.    Post Infusion Assessment:  Patient tolerated infusion without incident.  Velcade given subcutaneously in right arm. Darzalex faspro given subcutaneously in RLQ abdomen.      Discharge Plan:   Patient declined prescription refills.  AVS to patient via [employer].  Patient will return 9/17 for next appointment.   Patient discharged in stable condition accompanied by: self.  Departure Mode: Ambulatory.    Ai Fisher RN

## 2021-09-12 DIAGNOSIS — C90.00 MULTIPLE MYELOMA NOT HAVING ACHIEVED REMISSION (H): Primary | ICD-10-CM

## 2021-09-12 RX ORDER — NALOXONE HYDROCHLORIDE 0.4 MG/ML
.1-.4 INJECTION, SOLUTION INTRAMUSCULAR; INTRAVENOUS; SUBCUTANEOUS
Status: CANCELLED | OUTPATIENT
Start: 2021-10-05

## 2021-09-12 RX ORDER — ALBUTEROL SULFATE 90 UG/1
1-2 AEROSOL, METERED RESPIRATORY (INHALATION)
Status: CANCELLED
Start: 2021-10-05

## 2021-09-12 RX ORDER — SODIUM CHLORIDE 9 MG/ML
1000 INJECTION, SOLUTION INTRAVENOUS CONTINUOUS PRN
Status: CANCELLED
Start: 2021-10-05

## 2021-09-12 RX ORDER — MEPERIDINE HYDROCHLORIDE 25 MG/ML
25 INJECTION INTRAMUSCULAR; INTRAVENOUS; SUBCUTANEOUS EVERY 30 MIN PRN
Status: CANCELLED | OUTPATIENT
Start: 2021-10-05

## 2021-09-12 RX ORDER — LORAZEPAM 2 MG/ML
0.5 INJECTION INTRAMUSCULAR EVERY 4 HOURS PRN
Status: CANCELLED
Start: 2021-09-21

## 2021-09-12 RX ORDER — DIPHENHYDRAMINE HCL 25 MG
50 CAPSULE ORAL ONCE
Status: CANCELLED | OUTPATIENT
Start: 2021-10-05

## 2021-09-12 RX ORDER — METHYLPREDNISOLONE SODIUM SUCCINATE 125 MG/2ML
125 INJECTION, POWDER, LYOPHILIZED, FOR SOLUTION INTRAMUSCULAR; INTRAVENOUS
Status: CANCELLED
Start: 2021-10-05

## 2021-09-12 RX ORDER — NALOXONE HYDROCHLORIDE 0.4 MG/ML
.1-.4 INJECTION, SOLUTION INTRAMUSCULAR; INTRAVENOUS; SUBCUTANEOUS
Status: CANCELLED | OUTPATIENT
Start: 2021-09-21

## 2021-09-12 RX ORDER — ACETAMINOPHEN 325 MG/1
650 TABLET ORAL ONCE
Status: CANCELLED | OUTPATIENT
Start: 2021-10-05

## 2021-09-12 RX ORDER — ACETAMINOPHEN 325 MG/1
650 TABLET ORAL ONCE
Status: CANCELLED | OUTPATIENT
Start: 2021-09-28

## 2021-09-12 RX ORDER — MEPERIDINE HYDROCHLORIDE 25 MG/ML
25 INJECTION INTRAMUSCULAR; INTRAVENOUS; SUBCUTANEOUS EVERY 30 MIN PRN
Status: CANCELLED | OUTPATIENT
Start: 2021-09-21

## 2021-09-12 RX ORDER — DIPHENHYDRAMINE HCL 25 MG
50 CAPSULE ORAL ONCE
Status: CANCELLED | OUTPATIENT
Start: 2021-09-21

## 2021-09-12 RX ORDER — HEPARIN SODIUM (PORCINE) LOCK FLUSH IV SOLN 100 UNIT/ML 100 UNIT/ML
5 SOLUTION INTRAVENOUS
Status: CANCELLED | OUTPATIENT
Start: 2021-10-05

## 2021-09-12 RX ORDER — DIPHENHYDRAMINE HCL 25 MG
50 CAPSULE ORAL ONCE
Status: CANCELLED | OUTPATIENT
Start: 2021-09-28

## 2021-09-12 RX ORDER — ALBUTEROL SULFATE 0.83 MG/ML
2.5 SOLUTION RESPIRATORY (INHALATION)
Status: CANCELLED | OUTPATIENT
Start: 2021-09-21

## 2021-09-12 RX ORDER — ALBUTEROL SULFATE 90 UG/1
1-2 AEROSOL, METERED RESPIRATORY (INHALATION)
Status: CANCELLED
Start: 2021-09-21

## 2021-09-12 RX ORDER — DIPHENHYDRAMINE HYDROCHLORIDE 50 MG/ML
50 INJECTION INTRAMUSCULAR; INTRAVENOUS
Status: CANCELLED
Start: 2021-10-05

## 2021-09-12 RX ORDER — HEPARIN SODIUM,PORCINE 10 UNIT/ML
5 VIAL (ML) INTRAVENOUS
Status: CANCELLED | OUTPATIENT
Start: 2021-10-05

## 2021-09-12 RX ORDER — ACETAMINOPHEN 325 MG/1
650 TABLET ORAL ONCE
Status: CANCELLED | OUTPATIENT
Start: 2021-09-21

## 2021-09-12 RX ORDER — DIPHENHYDRAMINE HYDROCHLORIDE 50 MG/ML
50 INJECTION INTRAMUSCULAR; INTRAVENOUS
Status: CANCELLED
Start: 2021-09-28

## 2021-09-12 RX ORDER — NALOXONE HYDROCHLORIDE 0.4 MG/ML
.1-.4 INJECTION, SOLUTION INTRAMUSCULAR; INTRAVENOUS; SUBCUTANEOUS
Status: CANCELLED | OUTPATIENT
Start: 2021-09-28

## 2021-09-12 RX ORDER — METHYLPREDNISOLONE SODIUM SUCCINATE 125 MG/2ML
125 INJECTION, POWDER, LYOPHILIZED, FOR SOLUTION INTRAMUSCULAR; INTRAVENOUS
Status: CANCELLED
Start: 2021-09-28

## 2021-09-12 RX ORDER — SODIUM CHLORIDE 9 MG/ML
1000 INJECTION, SOLUTION INTRAVENOUS CONTINUOUS PRN
Status: CANCELLED
Start: 2021-09-21

## 2021-09-12 RX ORDER — LORAZEPAM 2 MG/ML
0.5 INJECTION INTRAMUSCULAR EVERY 4 HOURS PRN
Status: CANCELLED
Start: 2021-10-05

## 2021-09-12 RX ORDER — EPINEPHRINE 1 MG/ML
0.3 INJECTION, SOLUTION INTRAMUSCULAR; SUBCUTANEOUS EVERY 5 MIN PRN
Status: CANCELLED | OUTPATIENT
Start: 2021-09-28

## 2021-09-12 RX ORDER — ALBUTEROL SULFATE 90 UG/1
1-2 AEROSOL, METERED RESPIRATORY (INHALATION)
Status: CANCELLED
Start: 2021-09-28

## 2021-09-12 RX ORDER — DIPHENHYDRAMINE HYDROCHLORIDE 50 MG/ML
50 INJECTION INTRAMUSCULAR; INTRAVENOUS
Status: CANCELLED
Start: 2021-09-21

## 2021-09-12 RX ORDER — HEPARIN SODIUM (PORCINE) LOCK FLUSH IV SOLN 100 UNIT/ML 100 UNIT/ML
5 SOLUTION INTRAVENOUS
Status: CANCELLED | OUTPATIENT
Start: 2021-09-21

## 2021-09-12 RX ORDER — ALBUTEROL SULFATE 0.83 MG/ML
2.5 SOLUTION RESPIRATORY (INHALATION)
Status: CANCELLED | OUTPATIENT
Start: 2021-10-05

## 2021-09-12 RX ORDER — HEPARIN SODIUM,PORCINE 10 UNIT/ML
5 VIAL (ML) INTRAVENOUS
Status: CANCELLED | OUTPATIENT
Start: 2021-09-28

## 2021-09-12 RX ORDER — SODIUM CHLORIDE 9 MG/ML
1000 INJECTION, SOLUTION INTRAVENOUS CONTINUOUS PRN
Status: CANCELLED
Start: 2021-09-28

## 2021-09-12 RX ORDER — EPINEPHRINE 1 MG/ML
0.3 INJECTION, SOLUTION INTRAMUSCULAR; SUBCUTANEOUS EVERY 5 MIN PRN
Status: CANCELLED | OUTPATIENT
Start: 2021-10-05

## 2021-09-12 RX ORDER — METHYLPREDNISOLONE SODIUM SUCCINATE 125 MG/2ML
125 INJECTION, POWDER, LYOPHILIZED, FOR SOLUTION INTRAMUSCULAR; INTRAVENOUS
Status: CANCELLED
Start: 2021-09-21

## 2021-09-12 RX ORDER — LORAZEPAM 2 MG/ML
0.5 INJECTION INTRAMUSCULAR EVERY 4 HOURS PRN
Status: CANCELLED
Start: 2021-09-28

## 2021-09-12 RX ORDER — EPINEPHRINE 1 MG/ML
0.3 INJECTION, SOLUTION INTRAMUSCULAR; SUBCUTANEOUS EVERY 5 MIN PRN
Status: CANCELLED | OUTPATIENT
Start: 2021-09-21

## 2021-09-12 RX ORDER — HEPARIN SODIUM,PORCINE 10 UNIT/ML
5 VIAL (ML) INTRAVENOUS
Status: CANCELLED | OUTPATIENT
Start: 2021-09-21

## 2021-09-12 RX ORDER — ALBUTEROL SULFATE 0.83 MG/ML
2.5 SOLUTION RESPIRATORY (INHALATION)
Status: CANCELLED | OUTPATIENT
Start: 2021-09-28

## 2021-09-12 RX ORDER — MEPERIDINE HYDROCHLORIDE 25 MG/ML
25 INJECTION INTRAMUSCULAR; INTRAVENOUS; SUBCUTANEOUS EVERY 30 MIN PRN
Status: CANCELLED | OUTPATIENT
Start: 2021-09-28

## 2021-09-12 RX ORDER — HEPARIN SODIUM (PORCINE) LOCK FLUSH IV SOLN 100 UNIT/ML 100 UNIT/ML
5 SOLUTION INTRAVENOUS
Status: CANCELLED | OUTPATIENT
Start: 2021-09-28

## 2021-09-12 RX ORDER — DEXAMETHASONE 4 MG/1
20 TABLET ORAL ONCE
Status: CANCELLED | OUTPATIENT
Start: 2021-09-21

## 2021-09-15 ENCOUNTER — PATIENT OUTREACH (OUTPATIENT)
Dept: ONCOLOGY | Facility: CLINIC | Age: 51
End: 2021-09-15

## 2021-09-15 NOTE — TELEPHONE ENCOUNTER
Oncology RN Care Coordination Note:     Incoming Call:  This patient left a voicemail asking what he was scheduled for on Tuesday 09.21.2021 stating he is not available that day.  Patient was rescheduled.     Sho Mckeon, RN BSN   [facility]  Nurse Coordinator

## 2021-09-17 ENCOUNTER — INFUSION THERAPY VISIT (OUTPATIENT)
Dept: ONCOLOGY | Facility: CLINIC | Age: 51
End: 2021-09-17
Payer: COMMERCIAL

## 2021-09-17 ENCOUNTER — OFFICE VISIT (OUTPATIENT)
Dept: ORTHOPEDICS | Facility: CLINIC | Age: 51
End: 2021-09-17
Payer: COMMERCIAL

## 2021-09-17 ENCOUNTER — LAB (OUTPATIENT)
Dept: LAB | Facility: CLINIC | Age: 51
End: 2021-09-17
Payer: COMMERCIAL

## 2021-09-17 ENCOUNTER — ANCILLARY PROCEDURE (OUTPATIENT)
Dept: GENERAL RADIOLOGY | Facility: CLINIC | Age: 51
End: 2021-09-17
Attending: FAMILY MEDICINE
Payer: COMMERCIAL

## 2021-09-17 VITALS — WEIGHT: 163 LBS | BODY MASS INDEX: 27.16 KG/M2 | HEIGHT: 65 IN

## 2021-09-17 VITALS
WEIGHT: 163.2 LBS | SYSTOLIC BLOOD PRESSURE: 125 MMHG | RESPIRATION RATE: 16 BRPM | BODY MASS INDEX: 27.16 KG/M2 | DIASTOLIC BLOOD PRESSURE: 74 MMHG | OXYGEN SATURATION: 100 % | HEART RATE: 83 BPM | TEMPERATURE: 98.5 F

## 2021-09-17 DIAGNOSIS — M25.572 ACUTE LEFT ANKLE PAIN: Primary | ICD-10-CM

## 2021-09-17 DIAGNOSIS — C90.00 MULTIPLE MYELOMA NOT HAVING ACHIEVED REMISSION (H): Primary | ICD-10-CM

## 2021-09-17 DIAGNOSIS — M25.572 ACUTE LEFT ANKLE PAIN: ICD-10-CM

## 2021-09-17 DIAGNOSIS — Z79.899 ENCOUNTER FOR LONG-TERM (CURRENT) USE OF MEDICATIONS: ICD-10-CM

## 2021-09-17 LAB
ALBUMIN SERPL-MCNC: 3.4 G/DL (ref 3.4–5)
ALP SERPL-CCNC: 58 U/L (ref 40–150)
ALT SERPL W P-5'-P-CCNC: 16 U/L (ref 0–70)
ANION GAP SERPL CALCULATED.3IONS-SCNC: 6 MMOL/L (ref 3–14)
AST SERPL W P-5'-P-CCNC: 11 U/L (ref 0–45)
BASOPHILS # BLD AUTO: 0 10E3/UL (ref 0–0.2)
BASOPHILS NFR BLD AUTO: 0 %
BILIRUB SERPL-MCNC: 0.4 MG/DL (ref 0.2–1.3)
BUN SERPL-MCNC: 10 MG/DL (ref 7–30)
CALCIUM SERPL-MCNC: 8.6 MG/DL (ref 8.5–10.1)
CHLORIDE BLD-SCNC: 108 MMOL/L (ref 94–109)
CO2 SERPL-SCNC: 26 MMOL/L (ref 20–32)
CREAT SERPL-MCNC: 1.05 MG/DL (ref 0.66–1.25)
EOSINOPHIL # BLD AUTO: 1.3 10E3/UL (ref 0–0.7)
EOSINOPHIL NFR BLD AUTO: 15 %
ERYTHROCYTE [DISTWIDTH] IN BLOOD BY AUTOMATED COUNT: 16.5 % (ref 10–15)
GFR SERPL CREATININE-BSD FRML MDRD: 81 ML/MIN/1.73M2
GLUCOSE BLD-MCNC: 118 MG/DL (ref 70–99)
HCT VFR BLD AUTO: 32.4 % (ref 40–53)
HGB BLD-MCNC: 11 G/DL (ref 13.3–17.7)
IMM GRANULOCYTES # BLD: 0 10E3/UL
IMM GRANULOCYTES NFR BLD: 0 %
LYMPHOCYTES # BLD AUTO: 1.2 10E3/UL (ref 0.8–5.3)
LYMPHOCYTES NFR BLD AUTO: 14 %
MCH RBC QN AUTO: 31.5 PG (ref 26.5–33)
MCHC RBC AUTO-ENTMCNC: 34 G/DL (ref 31.5–36.5)
MCV RBC AUTO: 93 FL (ref 78–100)
MONOCYTES # BLD AUTO: 1.1 10E3/UL (ref 0–1.3)
MONOCYTES NFR BLD AUTO: 13 %
NEUTROPHILS # BLD AUTO: 5 10E3/UL (ref 1.6–8.3)
NEUTROPHILS NFR BLD AUTO: 58 %
NRBC # BLD AUTO: 0 10E3/UL
NRBC BLD AUTO-RTO: 0 /100
PLATELET # BLD AUTO: 328 10E3/UL (ref 150–450)
POTASSIUM BLD-SCNC: 3.6 MMOL/L (ref 3.4–5.3)
PROT SERPL-MCNC: 6.3 G/DL (ref 6.8–8.8)
RBC # BLD AUTO: 3.49 10E6/UL (ref 4.4–5.9)
SODIUM SERPL-SCNC: 140 MMOL/L (ref 133–144)
TOTAL PROTEIN SERUM FOR ELP: 6 G/DL (ref 6.8–8.8)
WBC # BLD AUTO: 8.6 10E3/UL (ref 4–11)

## 2021-09-17 PROCEDURE — 258N000003 HC RX IP 258 OP 636: Performed by: STUDENT IN AN ORGANIZED HEALTH CARE EDUCATION/TRAINING PROGRAM

## 2021-09-17 PROCEDURE — 36415 COLL VENOUS BLD VENIPUNCTURE: CPT

## 2021-09-17 PROCEDURE — 250N000013 HC RX MED GY IP 250 OP 250 PS 637: Performed by: STUDENT IN AN ORGANIZED HEALTH CARE EDUCATION/TRAINING PROGRAM

## 2021-09-17 PROCEDURE — 73610 X-RAY EXAM OF ANKLE: CPT | Mod: LT | Performed by: RADIOLOGY

## 2021-09-17 PROCEDURE — 99203 OFFICE O/P NEW LOW 30 MIN: CPT | Performed by: FAMILY MEDICINE

## 2021-09-17 PROCEDURE — 84155 ASSAY OF PROTEIN SERUM: CPT

## 2021-09-17 PROCEDURE — 250N000011 HC RX IP 250 OP 636: Performed by: STUDENT IN AN ORGANIZED HEALTH CARE EDUCATION/TRAINING PROGRAM

## 2021-09-17 PROCEDURE — 84165 PROTEIN E-PHORESIS SERUM: CPT | Mod: TC | Performed by: STUDENT IN AN ORGANIZED HEALTH CARE EDUCATION/TRAINING PROGRAM

## 2021-09-17 PROCEDURE — 82040 ASSAY OF SERUM ALBUMIN: CPT

## 2021-09-17 PROCEDURE — 85004 AUTOMATED DIFF WBC COUNT: CPT

## 2021-09-17 PROCEDURE — 96401 CHEMO ANTI-NEOPL SQ/IM: CPT

## 2021-09-17 PROCEDURE — 96374 THER/PROPH/DIAG INJ IV PUSH: CPT

## 2021-09-17 PROCEDURE — 36592 COLLECT BLOOD FROM PICC: CPT

## 2021-09-17 PROCEDURE — 84165 PROTEIN E-PHORESIS SERUM: CPT | Mod: 26 | Performed by: STUDENT IN AN ORGANIZED HEALTH CARE EDUCATION/TRAINING PROGRAM

## 2021-09-17 RX ORDER — ZOLEDRONIC ACID 0.04 MG/ML
4 INJECTION, SOLUTION INTRAVENOUS ONCE
Status: COMPLETED | OUTPATIENT
Start: 2021-09-17 | End: 2021-09-17

## 2021-09-17 RX ORDER — ACETAMINOPHEN 325 MG/1
650 TABLET ORAL ONCE
Status: COMPLETED | OUTPATIENT
Start: 2021-09-17 | End: 2021-09-17

## 2021-09-17 RX ORDER — DIPHENHYDRAMINE HCL 25 MG
50 CAPSULE ORAL ONCE
Status: COMPLETED | OUTPATIENT
Start: 2021-09-17 | End: 2021-09-17

## 2021-09-17 RX ADMIN — DIPHENHYDRAMINE HYDROCHLORIDE 50 MG: 25 CAPSULE ORAL at 15:30

## 2021-09-17 RX ADMIN — SODIUM CHLORIDE 250 ML: 9 INJECTION, SOLUTION INTRAVENOUS at 16:03

## 2021-09-17 RX ADMIN — BORTEZOMIB 2.5 MG: 3.5 INJECTION, POWDER, LYOPHILIZED, FOR SOLUTION INTRAVENOUS; SUBCUTANEOUS at 16:16

## 2021-09-17 RX ADMIN — ZOLEDRONIC ACID 4 MG: 0.04 INJECTION, SOLUTION INTRAVENOUS at 16:03

## 2021-09-17 RX ADMIN — ACETAMINOPHEN 650 MG: 325 TABLET ORAL at 15:30

## 2021-09-17 ASSESSMENT — PAIN SCALES - GENERAL: PAINLEVEL: MODERATE PAIN (5)

## 2021-09-17 ASSESSMENT — MIFFLIN-ST. JEOR: SCORE: 1511.24

## 2021-09-17 NOTE — PROGRESS NOTES
CHIEF COMPLAINT:  Pain of the Left Ankle       HISTORY OF PRESENT ILLNESS  Mr. Ronquillo is a pleasant 51 year old year old male who presents to clinic today with L ankle pain.      June explains that on Tuesday, 9/14/21, started to notice lateral L ankle pain throughout the day and worsening Sx at the end of the night.  Especially painful at night when he is trying to get to rest.     Denies pins and needles today, but noticed it earlier this week. Gentle walking seems to help slightly, with some massage.    Onset: sudden  Location: left ankle  Quality:  aching, throbbing occasionally  Duration: 3 days   Severity: 10/10 at worst  Timing:intermittent episodes nothing in particular makes it worse,   Modifying factors:  resting and non-use makes it better, movement and use makes it worse  Associated signs & symptoms: pain  Previous similar pain: No  Treatments to date:topical, ice, massage    Additional history: as documented    Review of Systems:    Have you recently had a a fever, chills, weight loss? No    Do you have any vision problems? No    Do you have any chest pain or edema? No    Do you have any shortness of breath or wheezing?  No    Do you have stomach problems? No    Do you have any numbness or focal weakness? No    Do you have diabetes? No    Do you have problems with bleeding or clotting? No    Do you have an rashes or other skin lesions? No    MEDICAL HISTORY  Patient Active Problem List   Diagnosis     CARDIOVASCULAR SCREENING; LDL GOAL LESS THAN 100     FH: heart disease     Overweight (BMI 25.0-29.9)     Hyperlipidemia LDL goal <100     Concussion without loss of consciousness, initial encounter     Concussion without loss of consciousness, subsequent encounter     Hypertension     CAD (coronary artery disease)     Pre-diabetes     Increased thyroid stimulating hormone (TSH) level     Increased liver enzymes     Hepatitis A antibody positive     Hypothyroidism due to Hashimoto's thyroiditis      Multiple myeloma not having achieved remission (H)     Keratoconus, stable condition       Current Outpatient Medications   Medication Sig Dispense Refill     acyclovir (ZOVIRAX) 400 MG tablet Take 1 tablet (400 mg) by mouth 2 times daily 60 tablet 6     acyclovir (ZOVIRAX) 400 MG tablet Take 1 tablet (400 mg) by mouth 2 times daily Viral Prophylaxis. 60 tablet 11     aspirin (ASA) 325 MG tablet Take 1 tablet (325 mg) by mouth daily 30 tablet 4     ASPIRIN 81 MG OR TABS 1 TABLET DAILY       calcium carb-cholecalciferol (OS-HARDEEP) 500-200 MG-UNIT tablet Take 1 tablet by mouth daily 30 tablet 11     dexamethasone (DECADRON) 4 MG tablet Take 20 mg (four tabs) by mouth on Day 2 15 tablet 0     dexamethasone (DECADRON) 4 MG tablet Take 20 mg (four tabs) by mouth on Day 2 15 tablet 0     FISH OIL 1000 MG OR CAPS 1 bid       hydrochlorothiazide (HYDRODIURIL) 50 MG tablet Take 1 tablet (50 mg) by mouth daily 30 tablet 3     LENalidomide (REVLIMID) 25 MG CAPS capsule Take 1 capsule (25 mg) by mouth daily for 14 days 14 capsule 0     levofloxacin (LEVAQUIN) 250 MG tablet Take 1 tablet (250 mg) by mouth daily 30 tablet 4     levothyroxine (SYNTHROID/LEVOTHROID) 50 MCG tablet Take 1 tablet (50 mcg) by mouth daily 90 tablet 1     losartan (COZAAR) 50 MG tablet Take 1 tablet (50 mg) by mouth daily 90 tablet 2     order for DME Rib belt 1 Device 0     pantoprazole (PROTONIX) 20 MG EC tablet Take 1 tablet (20 mg) by mouth daily 30 tablet 4     potassium chloride ER (KLOR-CON M) 10 MEQ CR tablet Take 2 tablets (20 mEq) by mouth daily 30 tablet 3     prochlorperazine (COMPAZINE) 10 MG tablet Take 1 tablet (10 mg) by mouth every 6 hours as needed (Nausea/Vomiting) (Patient not taking: Reported on 8/30/2021) 30 tablet 5     rosuvastatin (CRESTOR) 40 MG tablet TAKE ONE TABLET BY MOUTH ONCE DAILY. MUST MAKE APPOINTMENT FOR ANNUAL FOLLOW UP FOR FURTHER REFILLS 90 tablet 1       Allergies   Allergen Reactions     Nkda [No Known Drug  "Allergies]        Family History   Problem Relation Age of Onset     Hypertension Mother      Hyperlipidemia Mother      Heart Disease Paternal Grandmother      Hyperlipidemia Sister      Hyperlipidemia Sister        Additional medical/Social/Surgical histories reviewed in [facility] and updated as appropriate.       PHYSICAL EXAM  Ht 1.651 m (5' 5\")   Wt 73.9 kg (163 lb)   BMI 27.12 kg/m      General  - normal appearance, in no obvious distress  HEENT  - conjunctivae not injected, moist mucous membranes  CV  - normal pulses at posterior tib and dorsalis pedis  Pulm  - normal respiratory pattern, non-labored  Musculoskeletal -left  ankle  - stance:Minimally antalgic gait  - inspection: no swelling or effusion,  normal bone and joint alignment, no obvious deformity  - palpation: Tenderness at peroneal tendons. Tenderness lateral ankle,  AITFL ankle and anterior ankle joint. Nontender at achilles tendon.   - ROM: normal dorsiflexion, plantar flexion, inversion, eversion, not painful  - strength: 5/5 in all planes  - special tests:  (-) anterior drawer  (-) talar tilt  (-) Tinel's  (-) squeeze test  (-) [place] test  Neuro  - no sensory or motor deficit, grossly normal coordination, normal muscle tone  Skin  - no ecchymosis, erythema, warmth, or induration, no obvious rash  Psych  - interactive, appropriate, normal mood and affect    IMAGING : XR left ankle 3V. Final results and radiologist's interpretation, available in the [facility] health record. Images were reviewed with the patient/family members in the office today. My personal interpretation of the performed imaging is no acute osseous abnormality or significant degenerative changes of joint.       ASSESSMENT & PLAN  Mr. Ronquillo is a 51 year old year old male who presents to clinic today with acute left ankle pain without apparent injury and lack of swelling.    Concern for possible mild synovitis of ankle, subacute sprain or less likely radicular pain given paresthesias " currently resolved.  Early gout without swelling less likely as well. Tenderness focally surrounding ankle joint today and will proceed with treating ankle by stabilizing, reducing inflammation and HEP.    Diagnosis:   Acute pain of left ankle    -Ankle lace-up brace for stability  -ROM and HEP provided, demonstrated by ATC  -Ice, analgesics OTC antiinflammatories  -Monitor for effusion or swelling to develop, symptom diary to determine whether paresthesias return  -Follow up 2-3 weeks if no improvement.    It was a pleasure seeing June today.    Tato Dominique DO, CAQSM  Primary Care Sports Medicine

## 2021-09-17 NOTE — NURSING NOTE
Chief Complaint   Patient presents with     Blood Draw     labs drawn with piv start by rn.  vs taken     Labs drawn with PIV start by rn.  Pt tolerated well.  VS taken and pt checked in for next appt.    Aubrie Fleming RN

## 2021-09-17 NOTE — PROGRESS NOTES
"Infusion Nursing Note:  June Ronquillo presents today for Cycle 6, Day 8 Velcade and Zometa.    Patient seen by provider today: No   present during visit today: Not Applicable.    Note: Patient arrives today with 5/10 left ankle pain.  He says this got worse 2-3 days ago.  He reports that it has affected his sleep.  He says the pain feels like it is his \"ankle bone.\"  No swelling observed.  He states he is also having \"pins and needles\" sensation in his feet, that is worse at night.  He says this feeling is not new.  Shantell Valdez PA-C notified.    9/17/21 1510 TORB:  Sahntell Valdez PA-C/Lon Bates RN  - OK to give treatment today  - Send patient up to the ortho walk in clinic after infusion for an eval.  His symptoms sound more ortho related, as his last scan did not show any involvement of his feet/ankles.    Relayed above plan to patient.  Patient verbalized understanding.      Intravenous Access:  Peripheral IV placed.    Treatment Conditions:  Lab Results   Component Value Date    HGB 11.0 (L) 09/17/2021    WBC 8.6 09/17/2021    ANEU 4.7 07/09/2021    ANEUTAUTO 5.0 09/17/2021     09/17/2021      Lab Results   Component Value Date     09/17/2021    POTASSIUM 3.6 09/17/2021    CR 1.05 09/17/2021    HARDEEP 8.6 09/17/2021    BILITOTAL 0.4 09/17/2021    ALBUMIN 3.4 09/17/2021    ALT 16 09/17/2021    AST 11 09/17/2021     Results reviewed, labs MET treatment parameters, ok to proceed with treatment.    Post Infusion Assessment:  Patient tolerated infusion without incident.  Patient tolerated injection without incident.  Velcade administered subcutaneously into the back of patient's left arm.  Blood return noted pre and post infusion.  Site patent and intact, free from redness, edema or discomfort.  No evidence of extravasations.  Access discontinued per protocol.     Discharge Plan:   Patient declined prescription refills.  Discharge instructions reviewed with: Patient.  Patient " and/or family verbalized understanding of discharge instructions and all questions answered.  AVS to patient via [employer].  Patient scheduled to return 9/24, however, he usually gets 1 week off between cycles.  Message sent to Dr. Vazquez to clarify patient's treatment plan.  Patient discharged in stable condition accompanied by: self.  Departure Mode: Ambulatory.  Face to Face time: 5 minutes.      KAROLINA COTO RN

## 2021-09-17 NOTE — LETTER
9/17/2021      RE: June Ronquillo  [address] [facility] [address]       CHIEF COMPLAINT:  Pain of the Left Ankle       HISTORY OF PRESENT ILLNESS  Mr. Ronquillo is a pleasant 51 year old year old male who presents to clinic today with L ankle pain.      Jnue explains that on Tuesday, 9/14/21, started to notice lateral L ankle pain throughout the day and worsening Sx at the end of the night.  Especially painful at night when he is trying to get to rest.     Denies pins and needles today, but noticed it earlier this week. Gentle walking seems to help slightly, with some massage.    Onset: sudden  Location: left ankle  Quality:  aching, throbbing occasionally  Duration: 3 days   Severity: 10/10 at worst  Timing:intermittent episodes nothing in particular makes it worse,   Modifying factors:  resting and non-use makes it better, movement and use makes it worse  Associated signs & symptoms: pain  Previous similar pain: No  Treatments to date:topical, ice, massage    Additional history: as documented    Review of Systems:    Have you recently had a a fever, chills, weight loss? No    Do you have any vision problems? No    Do you have any chest pain or edema? No    Do you have any shortness of breath or wheezing?  No    Do you have stomach problems? No    Do you have any numbness or focal weakness? No    Do you have diabetes? No    Do you have problems with bleeding or clotting? No    Do you have an rashes or other skin lesions? No    MEDICAL HISTORY  Patient Active Problem List   Diagnosis     CARDIOVASCULAR SCREENING; LDL GOAL LESS THAN 100     FH: heart disease     Overweight (BMI 25.0-29.9)     Hyperlipidemia LDL goal <100     Concussion without loss of consciousness, initial encounter     Concussion without loss of consciousness, subsequent encounter     Hypertension     CAD (coronary artery disease)     Pre-diabetes     Increased thyroid stimulating hormone (TSH) level     Increased liver  enzymes     Hepatitis A antibody positive     Hypothyroidism due to Hashimoto's thyroiditis     Multiple myeloma not having achieved remission (H)     Keratoconus, stable condition       Current Outpatient Medications   Medication Sig Dispense Refill     acyclovir (ZOVIRAX) 400 MG tablet Take 1 tablet (400 mg) by mouth 2 times daily 60 tablet 6     acyclovir (ZOVIRAX) 400 MG tablet Take 1 tablet (400 mg) by mouth 2 times daily Viral Prophylaxis. 60 tablet 11     aspirin (ASA) 325 MG tablet Take 1 tablet (325 mg) by mouth daily 30 tablet 4     ASPIRIN 81 MG OR TABS 1 TABLET DAILY       calcium carb-cholecalciferol (OS-HARDEEP) 500-200 MG-UNIT tablet Take 1 tablet by mouth daily 30 tablet 11     dexamethasone (DECADRON) 4 MG tablet Take 20 mg (four tabs) by mouth on Day 2 15 tablet 0     dexamethasone (DECADRON) 4 MG tablet Take 20 mg (four tabs) by mouth on Day 2 15 tablet 0     FISH OIL 1000 MG OR CAPS 1 bid       hydrochlorothiazide (HYDRODIURIL) 50 MG tablet Take 1 tablet (50 mg) by mouth daily 30 tablet 3     LENalidomide (REVLIMID) 25 MG CAPS capsule Take 1 capsule (25 mg) by mouth daily for 14 days 14 capsule 0     levofloxacin (LEVAQUIN) 250 MG tablet Take 1 tablet (250 mg) by mouth daily 30 tablet 4     levothyroxine (SYNTHROID/LEVOTHROID) 50 MCG tablet Take 1 tablet (50 mcg) by mouth daily 90 tablet 1     losartan (COZAAR) 50 MG tablet Take 1 tablet (50 mg) by mouth daily 90 tablet 2     order for DME Rib belt 1 Device 0     pantoprazole (PROTONIX) 20 MG EC tablet Take 1 tablet (20 mg) by mouth daily 30 tablet 4     potassium chloride ER (KLOR-CON M) 10 MEQ CR tablet Take 2 tablets (20 mEq) by mouth daily 30 tablet 3     prochlorperazine (COMPAZINE) 10 MG tablet Take 1 tablet (10 mg) by mouth every 6 hours as needed (Nausea/Vomiting) (Patient not taking: Reported on 8/30/2021) 30 tablet 5     rosuvastatin (CRESTOR) 40 MG tablet TAKE ONE TABLET BY MOUTH ONCE DAILY. MUST MAKE APPOINTMENT FOR ANNUAL FOLLOW UP FOR  "FURTHER REFILLS 90 tablet 1       Allergies   Allergen Reactions     Nkda [No Known Drug Allergies]        Family History   Problem Relation Age of Onset     Hypertension Mother      Hyperlipidemia Mother      Heart Disease Paternal Grandmother      Hyperlipidemia Sister      Hyperlipidemia Sister        Additional medical/Social/Surgical histories reviewed in [facility] and updated as appropriate.       PHYSICAL EXAM  Ht 1.651 m (5' 5\")   Wt 73.9 kg (163 lb)   BMI 27.12 kg/m      General  - normal appearance, in no obvious distress  HEENT  - conjunctivae not injected, moist mucous membranes  CV  - normal pulses at posterior tib and dorsalis pedis  Pulm  - normal respiratory pattern, non-labored  Musculoskeletal -left  ankle  - stance:Minimally antalgic gait  - inspection: no swelling or effusion,  normal bone and joint alignment, no obvious deformity  - palpation: Tenderness at peroneal tendons. Tenderness lateral ankle,  AITFL ankle and anterior ankle joint. Nontender at achilles tendon.   - ROM: normal dorsiflexion, plantar flexion, inversion, eversion, not painful  - strength: 5/5 in all planes  - special tests:  (-) anterior drawer  (-) talar tilt  (-) Tinel's  (-) squeeze test  (-) [place] test  Neuro  - no sensory or motor deficit, grossly normal coordination, normal muscle tone  Skin  - no ecchymosis, erythema, warmth, or induration, no obvious rash  Psych  - interactive, appropriate, normal mood and affect    IMAGING : XR left ankle 3V. Final results and radiologist's interpretation, available in the [facility] health record. Images were reviewed with the patient/family members in the office today. My personal interpretation of the performed imaging is no acute osseous abnormality or significant degenerative changes of joint.       ASSESSMENT & PLAN  Mr. Ronquillo is a 51 year old year old male who presents to clinic today with acute left ankle pain without apparent injury and lack of swelling.    Concern for possible " mild synovitis of ankle, subacute sprain or less likely radicular pain given paresthesias currently resolved.  Early gout without swelling less likely as well. Tenderness focally surrounding ankle joint today and will proceed with treating ankle by stabilizing, reducing inflammation and HEP.    Diagnosis:   Acute pain of left ankle    -Ankle lace-up brace for stability  -ROM and HEP provided, demonstrated by ATC  -Ice, analgesics OTC antiinflammatories  -Monitor for effusion or swelling to develop, symptom diary to determine whether paresthesias return  -Follow up 2-3 weeks if no improvement.    It was a pleasure seeing June today.    Tato Dominique DO, CAQSM  Primary Care Sports Medicine

## 2021-09-19 ENCOUNTER — MYC MEDICAL ADVICE (OUTPATIENT)
Dept: ONCOLOGY | Facility: CLINIC | Age: 51
End: 2021-09-19

## 2021-09-19 DIAGNOSIS — C90.00 MULTIPLE MYELOMA NOT HAVING ACHIEVED REMISSION (H): Primary | ICD-10-CM

## 2021-09-20 ENCOUNTER — TELEPHONE (OUTPATIENT)
Dept: ONCOLOGY | Facility: CLINIC | Age: 51
End: 2021-09-20

## 2021-09-20 DIAGNOSIS — C90.00 MULTIPLE MYELOMA NOT HAVING ACHIEVED REMISSION (H): Primary | ICD-10-CM

## 2021-09-20 RX ORDER — LENALIDOMIDE 25 MG/1
25 CAPSULE ORAL DAILY
Qty: 14 CAPSULE | Refills: 0 | Status: CANCELLED | OUTPATIENT
Start: 2021-09-21 | End: 2021-10-05

## 2021-09-20 RX ORDER — LENALIDOMIDE 25 MG/1
25 CAPSULE ORAL DAILY
Qty: 14 CAPSULE | Refills: 0 | Status: SHIPPED | OUTPATIENT
Start: 2021-09-20 | End: 2021-11-03

## 2021-09-20 RX ORDER — DEXAMETHASONE 4 MG/1
TABLET ORAL
Qty: 15 TABLET | Refills: 0 | Status: SHIPPED | OUTPATIENT
Start: 2021-09-20 | End: 2021-11-04

## 2021-09-20 NOTE — TELEPHONE ENCOUNTER
Oral Chemotherapy Monitoring Program   Medication: Revlimid  Rx: 25mg PO daily on days 1 through 14 of 21 day cycle   Auth #:  [id number]  Risk Category: Adult Male  Routine survey questions reviewed.   Rx to be Escribed to [facility]    Brice Persaud CPhT  [facility]  Oncology Pharmacy Liaison   [email or web address]  [phone] (phone  [phone] (fax

## 2021-09-20 NOTE — TELEPHONE ENCOUNTER
Feeling much better today. All side effects are gone except for nausea. Is able to eat and drink without difficulty. States he will utilize compazine if necessary but that he is feeling much better today. Wanted to report symptoms so doctor can change his treatment or add to it so symptoms will not be so severe next time.         ADDENDUM:    Decreased zometa to 3 mg with next cycle to see if he can tolerate it. Plan to increase back to 4 mg after 1-2 doses if no further side effects    AM

## 2021-09-21 ENCOUNTER — MYC MEDICAL ADVICE (OUTPATIENT)
Dept: ONCOLOGY | Facility: CLINIC | Age: 51
End: 2021-09-21

## 2021-09-21 LAB
ALBUMIN SERPL ELPH-MCNC: 4 G/DL (ref 3.7–5.1)
ALPHA1 GLOB SERPL ELPH-MCNC: 0.3 G/DL (ref 0.2–0.4)
ALPHA2 GLOB SERPL ELPH-MCNC: 0.7 G/DL (ref 0.5–0.9)
B-GLOBULIN SERPL ELPH-MCNC: 0.7 G/DL (ref 0.6–1)
GAMMA GLOB SERPL ELPH-MCNC: 0.4 G/DL (ref 0.7–1.6)
M PROTEIN SERPL ELPH-MCNC: 0.1 G/DL
PROT PATTERN SERPL ELPH-IMP: ABNORMAL

## 2021-09-21 RX ORDER — HEPARIN SODIUM,PORCINE 10 UNIT/ML
5 VIAL (ML) INTRAVENOUS
Status: CANCELLED | OUTPATIENT
Start: 2022-02-21

## 2021-09-21 RX ORDER — HEPARIN SODIUM (PORCINE) LOCK FLUSH IV SOLN 100 UNIT/ML 100 UNIT/ML
5 SOLUTION INTRAVENOUS
Status: CANCELLED | OUTPATIENT
Start: 2022-02-21

## 2021-09-22 DIAGNOSIS — C90.00 MULTIPLE MYELOMA, REMISSION STATUS UNSPECIFIED (H): ICD-10-CM

## 2021-09-22 DIAGNOSIS — C90.00 MULTIPLE MYELOMA NOT HAVING ACHIEVED REMISSION (H): Primary | ICD-10-CM

## 2021-09-22 DIAGNOSIS — Z86.2 PERSONAL HISTORY OF DISEASES OF BLOOD AND BLOOD-FORMING ORGANS: ICD-10-CM

## 2021-09-23 ENCOUNTER — TELEPHONE (OUTPATIENT)
Dept: ENDOCRINOLOGY | Facility: CLINIC | Age: 51
End: 2021-09-23

## 2021-09-23 NOTE — TELEPHONE ENCOUNTER
Action 9/23/21    Action Taken Records internal. Scheduled for echo morning of appt with Dr. Ceja.

## 2021-09-24 DIAGNOSIS — Z11.59 ENCOUNTER FOR SCREENING FOR OTHER VIRAL DISEASES: ICD-10-CM

## 2021-09-24 NOTE — TELEPHONE ENCOUNTER
Endocrine triage  The scheduled first available endocrine appointment timeframe is acceptable  Juany Ramirez MD

## 2021-09-29 DIAGNOSIS — C90.00 MULTIPLE MYELOMA NOT HAVING ACHIEVED REMISSION (H): ICD-10-CM

## 2021-09-29 RX ORDER — POTASSIUM CHLORIDE 750 MG/1
20 TABLET, EXTENDED RELEASE ORAL DAILY
Qty: 30 TABLET | Refills: 3 | OUTPATIENT
Start: 2021-09-29

## 2021-10-03 DIAGNOSIS — I25.10 CVD (CARDIOVASCULAR DISEASE): ICD-10-CM

## 2021-10-03 DIAGNOSIS — E78.5 HYPERLIPIDEMIA LDL GOAL <100: ICD-10-CM

## 2021-10-04 RX ORDER — ROSUVASTATIN CALCIUM 40 MG/1
TABLET, COATED ORAL
Qty: 90 TABLET | Refills: 1 | Status: ON HOLD | OUTPATIENT
Start: 2021-10-04 | End: 2021-11-11

## 2021-10-05 DIAGNOSIS — C90.00 MULTIPLE MYELOMA NOT HAVING ACHIEVED REMISSION (H): ICD-10-CM

## 2021-10-05 NOTE — TELEPHONE ENCOUNTER
If patient is no longer to be taking this medication, please advise.    Thank you,  Haven Mejia, [facility]  [phone]

## 2021-10-07 LAB
CULTURE HARVEST COMPLETE DATE: NORMAL

## 2021-10-09 LAB — INTERPRETATION: NORMAL

## 2021-10-11 ENCOUNTER — MYC MEDICAL ADVICE (OUTPATIENT)
Dept: CARDIOLOGY | Facility: CLINIC | Age: 51
End: 2021-10-11

## 2021-10-11 DIAGNOSIS — E78.5 HYPERLIPIDEMIA LDL GOAL <100: Primary | ICD-10-CM

## 2021-10-11 NOTE — TELEPHONE ENCOUNTER
Sonny received:   June Ronquillo, Dominguez Garcia MD 25 minutes ago (11:59 AM)           roma Craig,     Hope you are well.     As you can tell by my medical history, I was diagnosed with Multiple Myeloma about 6 months ago and are planning for transplant process in the next 3 months.  All this to say, I visit to get my blood drawn often, could I also get an order from you during one of these visits to get a lipid panel, kidney-liver function etc.?  Could I postpone our annual visit to after my transplant given that they will do a complete cardiovascular check up prior to my transplant. ( I am busy seeing doctors almost every day for my MM.  I love visiting with you so you know but MM has taken front seat for now.)     Thank you for considering my request.     Logan      FLP/ALT ordered for 1/2022. 1 year since her last check. Pt is getting CMPs done through oncology.

## 2021-10-12 RX ORDER — POTASSIUM CHLORIDE 750 MG/1
20 TABLET, EXTENDED RELEASE ORAL DAILY
Qty: 30 TABLET | Refills: 3 | Status: SHIPPED | OUTPATIENT
Start: 2021-10-12 | End: 2022-01-21

## 2021-10-12 NOTE — TELEPHONE ENCOUNTER
[facility] called wondering if pt should be taking Potassium and if it has been discontinued to refuse the pending prescription.    If would like pt to continue to take then approve the pending prescription.    Last Potassium Lab Results on 9/17 was 3.6.     Routed to Dr. Vazquez and RNCC Sho.

## 2021-10-13 ENCOUNTER — ANESTHESIA EVENT (OUTPATIENT)
Dept: SURGERY | Facility: AMBULATORY SURGERY CENTER | Age: 51
End: 2021-10-13
Payer: COMMERCIAL

## 2021-10-13 ENCOUNTER — ONCOLOGY VISIT (OUTPATIENT)
Dept: TRANSPLANT | Facility: CLINIC | Age: 51
End: 2021-10-13
Attending: STUDENT IN AN ORGANIZED HEALTH CARE EDUCATION/TRAINING PROGRAM
Payer: COMMERCIAL

## 2021-10-13 ENCOUNTER — LAB (OUTPATIENT)
Dept: LAB | Facility: CLINIC | Age: 51
End: 2021-10-13
Attending: NURSE PRACTITIONER
Payer: COMMERCIAL

## 2021-10-13 ENCOUNTER — ALLIED HEALTH/NURSE VISIT (OUTPATIENT)
Dept: TRANSPLANT | Facility: CLINIC | Age: 51
End: 2021-10-13
Attending: INTERNAL MEDICINE
Payer: COMMERCIAL

## 2021-10-13 ENCOUNTER — MEDICAL CORRESPONDENCE (OUTPATIENT)
Dept: TRANSPLANT | Facility: CLINIC | Age: 51
End: 2021-10-13

## 2021-10-13 ENCOUNTER — APPOINTMENT (OUTPATIENT)
Dept: LAB | Facility: CLINIC | Age: 51
End: 2021-10-13
Attending: INTERNAL MEDICINE
Payer: COMMERCIAL

## 2021-10-13 VITALS
HEART RATE: 91 BPM | RESPIRATION RATE: 16 BRPM | BODY MASS INDEX: 26.98 KG/M2 | DIASTOLIC BLOOD PRESSURE: 86 MMHG | SYSTOLIC BLOOD PRESSURE: 147 MMHG | HEIGHT: 65 IN | OXYGEN SATURATION: 100 % | WEIGHT: 161.9 LBS | TEMPERATURE: 98 F

## 2021-10-13 DIAGNOSIS — C90.00 MULTIPLE MYELOMA NOT HAVING ACHIEVED REMISSION (H): Primary | ICD-10-CM

## 2021-10-13 DIAGNOSIS — C90.00 MULTIPLE MYELOMA NOT HAVING ACHIEVED REMISSION (H): ICD-10-CM

## 2021-10-13 DIAGNOSIS — Z11.59 ENCOUNTER FOR SCREENING FOR OTHER VIRAL DISEASES: ICD-10-CM

## 2021-10-13 DIAGNOSIS — Z86.2 PERSONAL HISTORY OF DISEASES OF BLOOD AND BLOOD-FORMING ORGANS: ICD-10-CM

## 2021-10-13 DIAGNOSIS — C90.00 MULTIPLE MYELOMA, REMISSION STATUS UNSPECIFIED (H): ICD-10-CM

## 2021-10-13 DIAGNOSIS — E78.5 HYPERLIPIDEMIA LDL GOAL <100: ICD-10-CM

## 2021-10-13 LAB
ABO/RH(D): ABNORMAL
ALBUMIN SERPL-MCNC: 4.1 G/DL (ref 3.4–5)
ALBUMIN UR-MCNC: NEGATIVE MG/DL
ALP SERPL-CCNC: 66 U/L (ref 40–150)
ALT SERPL W P-5'-P-CCNC: 17 U/L (ref 0–70)
ALT SERPL W P-5'-P-CCNC: 17 U/L (ref 0–70)
ANION GAP SERPL CALCULATED.3IONS-SCNC: 5 MMOL/L (ref 3–14)
ANTIBODY SCREEN, TUBE: NORMAL
ANTIBODY SCREEN: POSITIVE
ANTIBODY UNIDENTIFIED: NORMAL
APPEARANCE UR: CLEAR
APTT PPP: 31 SECONDS (ref 22–38)
AST SERPL W P-5'-P-CCNC: 16 U/L (ref 0–45)
BASOPHILS # BLD AUTO: 0.1 10E3/UL (ref 0–0.2)
BASOPHILS NFR BLD AUTO: 1 %
BILIRUB SERPL-MCNC: 0.4 MG/DL (ref 0.2–1.3)
BILIRUB UR QL STRIP: NEGATIVE
BUN SERPL-MCNC: 12 MG/DL (ref 7–30)
CALCIUM SERPL-MCNC: 9 MG/DL (ref 8.5–10.1)
CHLORIDE BLD-SCNC: 109 MMOL/L (ref 94–109)
CHOLEST SERPL-MCNC: 163 MG/DL
CO2 SERPL-SCNC: 23 MMOL/L (ref 20–32)
COLOR UR AUTO: NORMAL
CREAT SERPL-MCNC: 0.99 MG/DL (ref 0.66–1.25)
EOSINOPHIL # BLD AUTO: 0.4 10E3/UL (ref 0–0.7)
EOSINOPHIL NFR BLD AUTO: 5 %
ERYTHROCYTE [DISTWIDTH] IN BLOOD BY AUTOMATED COUNT: 14.9 % (ref 10–15)
FASTING STATUS PATIENT QL REPORTED: YES
GFR SERPL CREATININE-BSD FRML MDRD: 87 ML/MIN/1.73M2
GLUCOSE BLD-MCNC: 124 MG/DL (ref 70–99)
GLUCOSE UR STRIP-MCNC: NEGATIVE MG/DL
HCT VFR BLD AUTO: 38 % (ref 40–53)
HDLC SERPL-MCNC: 43 MG/DL
HGB BLD-MCNC: 12.6 G/DL (ref 13.3–17.7)
HGB UR QL STRIP: NEGATIVE
IMM GRANULOCYTES # BLD: 0 10E3/UL
IMM GRANULOCYTES NFR BLD: 0 %
INR PPP: 1.03 (ref 0.85–1.15)
KETONES UR STRIP-MCNC: NEGATIVE MG/DL
LDH SERPL L TO P-CCNC: 100 U/L (ref 85–227)
LDLC SERPL CALC-MCNC: 93 MG/DL
LEUKOCYTE ESTERASE UR QL STRIP: NEGATIVE
LYMPHOCYTES # BLD AUTO: 1.6 10E3/UL (ref 0.8–5.3)
LYMPHOCYTES NFR BLD AUTO: 21 %
MAGNESIUM SERPL-MCNC: 2.2 MG/DL (ref 1.6–2.3)
MCH RBC QN AUTO: 31 PG (ref 26.5–33)
MCHC RBC AUTO-ENTMCNC: 33.2 G/DL (ref 31.5–36.5)
MCV RBC AUTO: 93 FL (ref 78–100)
MONOCYTES # BLD AUTO: 0.6 10E3/UL (ref 0–1.3)
MONOCYTES NFR BLD AUTO: 8 %
NEUTROPHILS # BLD AUTO: 5 10E3/UL (ref 1.6–8.3)
NEUTROPHILS NFR BLD AUTO: 65 %
NITRATE UR QL: NEGATIVE
NONHDLC SERPL-MCNC: 120 MG/DL
NRBC # BLD AUTO: 0 10E3/UL
NRBC BLD AUTO-RTO: 0 /100
PH UR STRIP: 6 [PH] (ref 5–7)
PHOSPHATE SERPL-MCNC: 2.2 MG/DL (ref 2.5–4.5)
PLATELET # BLD AUTO: 457 10E3/UL (ref 150–450)
POTASSIUM BLD-SCNC: 3.5 MMOL/L (ref 3.4–5.3)
PROT SERPL-MCNC: 7 G/DL (ref 6.8–8.8)
RBC # BLD AUTO: 4.07 10E6/UL (ref 4.4–5.9)
RBC URINE: <1 /HPF
SARS-COV-2 RNA RESP QL NAA+PROBE: NEGATIVE
SODIUM SERPL-SCNC: 137 MMOL/L (ref 133–144)
SP GR UR STRIP: 1.01 (ref 1–1.03)
SPECIMEN EXPIRATION DATE: ABNORMAL
SPECIMEN EXPIRATION DATE: NORMAL
SPECIMEN EXPIRATION DATE: NORMAL
TOTAL PROTEIN SERUM FOR ELP: 7 G/DL (ref 6.8–8.8)
TRIGL SERPL-MCNC: 134 MG/DL
TROPONIN I SERPL-MCNC: <0.015 UG/L (ref 0–0.04)
URATE SERPL-MCNC: 4.9 MG/DL (ref 3.5–7.2)
UROBILINOGEN UR STRIP-MCNC: NORMAL MG/DL
WBC # BLD AUTO: 7.6 10E3/UL (ref 4–11)
WBC URINE: 1 /HPF

## 2021-10-13 PROCEDURE — 83021 HEMOGLOBIN CHROMOTOGRAPHY: CPT

## 2021-10-13 PROCEDURE — 80053 COMPREHEN METABOLIC PANEL: CPT | Performed by: PATHOLOGY

## 2021-10-13 PROCEDURE — 99215 OFFICE O/P EST HI 40 MIN: CPT

## 2021-10-13 PROCEDURE — 82232 ASSAY OF BETA-2 PROTEIN: CPT

## 2021-10-13 PROCEDURE — 84165 PROTEIN E-PHORESIS SERUM: CPT | Mod: 26 | Performed by: PATHOLOGY

## 2021-10-13 PROCEDURE — 83735 ASSAY OF MAGNESIUM: CPT

## 2021-10-13 PROCEDURE — U0003 INFECTIOUS AGENT DETECTION BY NUCLEIC ACID (DNA OR RNA); SEVERE ACUTE RESPIRATORY SYNDROME CORONAVIRUS 2 (SARS-COV-2) (CORONAVIRUS DISEASE [COVID-19]), AMPLIFIED PROBE TECHNIQUE, MAKING USE OF HIGH THROUGHPUT TECHNOLOGIES AS DESCRIBED BY CMS-2020-01-R: HCPCS | Mod: 90 | Performed by: PATHOLOGY

## 2021-10-13 PROCEDURE — 88240 CELL CRYOPRESERVE/STORAGE: CPT

## 2021-10-13 PROCEDURE — 36415 COLL VENOUS BLD VENIPUNCTURE: CPT | Performed by: PATHOLOGY

## 2021-10-13 PROCEDURE — 94729 DIFFUSING CAPACITY: CPT | Performed by: INTERNAL MEDICINE

## 2021-10-13 PROCEDURE — 84550 ASSAY OF BLOOD/URIC ACID: CPT

## 2021-10-13 PROCEDURE — 82784 ASSAY IGA/IGD/IGG/IGM EACH: CPT

## 2021-10-13 PROCEDURE — 81001 URINALYSIS AUTO W/SCOPE: CPT

## 2021-10-13 PROCEDURE — G0463 HOSPITAL OUTPT CLINIC VISIT: HCPCS

## 2021-10-13 PROCEDURE — 0001U RBC DNA HEA 35 AG 11 BLD GRP: CPT

## 2021-10-13 PROCEDURE — 84165 PROTEIN E-PHORESIS SERUM: CPT | Mod: TC | Performed by: PATHOLOGY

## 2021-10-13 PROCEDURE — 84100 ASSAY OF PHOSPHORUS: CPT

## 2021-10-13 PROCEDURE — 86703 HIV-1/HIV-2 1 RESULT ANTBDY: CPT

## 2021-10-13 PROCEDURE — 84484 ASSAY OF TROPONIN QUANT: CPT

## 2021-10-13 PROCEDURE — 94726 PLETHYSMOGRAPHY LUNG VOLUMES: CPT | Performed by: INTERNAL MEDICINE

## 2021-10-13 PROCEDURE — 85025 COMPLETE CBC W/AUTO DIFF WBC: CPT

## 2021-10-13 PROCEDURE — 86334 IMMUNOFIX E-PHORESIS SERUM: CPT | Mod: TC

## 2021-10-13 PROCEDURE — 94060 EVALUATION OF WHEEZING: CPT | Performed by: INTERNAL MEDICINE

## 2021-10-13 PROCEDURE — 86334 IMMUNOFIX E-PHORESIS SERUM: CPT | Mod: 26 | Performed by: PATHOLOGY

## 2021-10-13 PROCEDURE — 87516 HEPATITIS B DNA AMP PROBE: CPT

## 2021-10-13 PROCEDURE — 86850 RBC ANTIBODY SCREEN: CPT

## 2021-10-13 PROCEDURE — 82785 ASSAY OF IGE: CPT

## 2021-10-13 PROCEDURE — 36415 COLL VENOUS BLD VENIPUNCTURE: CPT

## 2021-10-13 PROCEDURE — 85730 THROMBOPLASTIN TIME PARTIAL: CPT

## 2021-10-13 PROCEDURE — 84460 ALANINE AMINO (ALT) (SGPT): CPT

## 2021-10-13 PROCEDURE — 83615 LACTATE (LD) (LDH) ENZYME: CPT

## 2021-10-13 PROCEDURE — 83883 ASSAY NEPHELOMETRY NOT SPEC: CPT

## 2021-10-13 PROCEDURE — 84155 ASSAY OF PROTEIN SERUM: CPT

## 2021-10-13 PROCEDURE — 86695 HERPES SIMPLEX TYPE 1 TEST: CPT

## 2021-10-13 PROCEDURE — 86665 EPSTEIN-BARR CAPSID VCA: CPT

## 2021-10-13 PROCEDURE — 80061 LIPID PANEL: CPT | Performed by: PATHOLOGY

## 2021-10-13 PROCEDURE — 86870 RBC ANTIBODY IDENTIFICATION: CPT

## 2021-10-13 PROCEDURE — 93010 ELECTROCARDIOGRAM REPORT: CPT | Performed by: INTERNAL MEDICINE

## 2021-10-13 PROCEDURE — U0005 INFEC AGEN DETEC AMPLI PROBE: HCPCS | Mod: 90 | Performed by: PATHOLOGY

## 2021-10-13 PROCEDURE — 85610 PROTHROMBIN TIME: CPT

## 2021-10-13 PROCEDURE — 86901 BLOOD TYPING SEROLOGIC RH(D): CPT

## 2021-10-13 PROCEDURE — 86696 HERPES SIMPLEX TYPE 2 TEST: CPT

## 2021-10-13 RX ORDER — LIDOCAINE 40 MG/G
CREAM TOPICAL
Status: CANCELLED | OUTPATIENT
Start: 2021-10-13

## 2021-10-13 RX ORDER — LIDOCAINE HYDROCHLORIDE 10 MG/ML
8-10 INJECTION, SOLUTION EPIDURAL; INFILTRATION; INTRACAUDAL; PERINEURAL
Status: CANCELLED | OUTPATIENT
Start: 2021-10-13

## 2021-10-13 ASSESSMENT — MIFFLIN-ST. JEOR: SCORE: 1511.86

## 2021-10-13 ASSESSMENT — PAIN SCALES - GENERAL: PAINLEVEL: MODERATE PAIN (4)

## 2021-10-13 NOTE — PROGRESS NOTES
BMT Teaching Flowsheet    June Ronquillo is a 51 year old male  Diagnoses of Multiple myeloma, remission status unspecified (H) and Personal history of diseases of blood and blood-forming organs were pertinent to this visit.    Teaching Topic: RN met with patient to complete nurse work up. Patient height, weight and vitals competed. Medication recognition completed. Hippa and screening consent signed and placed in BMT office folder. UA was collected as well as labs via venipuncture and sent to lab. 24 hour urine collection was explained and jug given and he will complete asap. Binder as well given to patient. Patient got covid test this morning so this was not collected at this visit. Fraility paperwork filled out by patient and then notified MA to do fraility and EKG. All questions and concerns were answered for the patient.    Person(s) involved in teaching: Patient  Motivation Level  Asks Questions: Yes  Eager to Learn: Yes  Cooperative: Yes  Receptive (willing/able to accept information): Yes  Any cultural factors/Hoahaoism beliefs that may influence understanding or compliance? No    Patient demonstrates understanding of the following:  - Reason for the appointment, diagnosis and treatment plan: Yes  - Knowledge of proper use of medications and conditions for which they are ordered (with special attention to potential side effects or drug interactions): Yes  - Which situations necessitate calling provider and whom to contact: Yes    Teaching concerns addressed: all questions and concerns were answered for the patient    Proper use and care of (medical equipment, care aids, etc.) Yes  Pain management techniques: Yes  Patient instructed on hand hygiene: Yes  How and/when to access community resources: Yes    Instructional Materials Used/Given:   Verbal instructions and copy of calender.

## 2021-10-13 NOTE — NURSING NOTE
EKG was performed today per order written by Murali.  Name and  verified with patient.    Elenita Gatica LPN

## 2021-10-13 NOTE — LETTER
10/13/2021         RE: June Ronquillo  [address]  [facility] [address]        Dear Colleague,    Thank you for referring your patient, June Ronquillo, to the [facility] BLOOD AND MARROW TRANSPLANT PROGRAM [place]. Please see a copy of my visit note below.        [facility]  BMTCT OPEN VISIT    October 13, 2021        June Ronquillo is a 51 year old male undergoing evaluation prior to hematopoietic cell transplant or immune effector cell therapy.    Reason for BMTCT: Multiple myeloma    Recent chemotherapy: end of Sept 2021    Recent infections: none    Blood thinner use? If yes, why? No    Treatment for diabetes? No          Today, the patient notes the following symptoms:  Review Of Systems  Skin: negative  Eyes: negative  Ears/Nose/Throat: positive for recent URI ending 10/9 about; afebrile throughout  Respiratory: Cough with cold as noted above, productive sputum now fully resolved  Cardiovascular: negative  Gastrointestinal: negative  Genitourinary: negative  Musculoskeletal: neuropathy in feet   Neurologic: negative  Psychiatric: negative  Hematologic/Lymphatic/Immunologic: negative  Endocrine: negative      June Ronquillo's History    Past Medical History:   Diagnosis Date     CAD (coronary artery disease)     s/p stent to CFX     Heart attack (H)      Hyperlipidemia LDL goal <100      Hypertension      Stented coronary artery      Thyroid disease        Past Surgical History:   Procedure Laterality Date     BONE MARROW BIOPSY, BONE SPECIMEN, NEEDLE/TROCAR Left 5/17/2021    Procedure: BIOPSY, BONE MARROW with aspiration and ancillary studies;  Surgeon: Niharika Regalado MD;  Location: [facility] OR     HEART CATH PTCA SINGLE VESSEL  10/10/2004    Stent to CFX - [facility]        Family History   Problem Relation Age of Onset     Hypertension Mother      Hyperlipidemia Mother      Heart Disease Paternal Grandmother      Hyperlipidemia Sister       Hyperlipidemia Sister        Social History     Tobacco Use     Smoking status: Never Smoker     Smokeless tobacco: Never Used   Substance Use Topics     Alcohol use: Not Currently           Jitendrapal S Yg's Medications and Allergies    Current Outpatient Medications   Medication     acyclovir (ZOVIRAX) 400 MG tablet     aspirin (ASA) 325 MG tablet     calcium carb-cholecalciferol (OS-HARDEEP) 500-200 MG-UNIT tablet     dexamethasone (DECADRON) 4 MG tablet     FISH OIL 1000 MG OR CAPS     hydrochlorothiazide (HYDRODIURIL) 50 MG tablet     LENalidomide (REVLIMID) 25 MG CAPS capsule     levofloxacin (LEVAQUIN) 250 MG tablet     levothyroxine (SYNTHROID/LEVOTHROID) 50 MCG tablet     losartan (COZAAR) 50 MG tablet     order for DME     pantoprazole (PROTONIX) 20 MG EC tablet     potassium chloride ER (KLOR-CON M) 10 MEQ CR tablet     prochlorperazine (COMPAZINE) 10 MG tablet     rosuvastatin (CRESTOR) 40 MG tablet     No current facility-administered medications for this visit.          Allergies   Allergen Reactions     Nkda [No Known Drug Allergies]            Physical Examination    There were no vitals taken for this visit.    Exam:  Constitutional: healthy, alert and no distress  Head: Normocephalic. No masses, lesions, tenderness or abnormalities  ENT: ENT exam normal, no neck nodes or sinus tenderness  Cardiovascular: negative, PMI normal. No lifts, heaves, or thrills. RRR. No murmurs, clicks gallops or rub  Respiratory: negative, Percussion normal. Good diaphragmatic excursion. Lungs clear  Gastrointestinal: Abdomen soft, non-tender. BS normal. No masses, organomegaly  : Deferred  Musculoskeletal: extremities normal- no gross deformities noted, gait normal and normal muscle tone  Skin: no suspicious lesions or rashes  Neurologic: Gait normal. CN II-XII without focal deficits. Reflexes normal and symmetric. Sensation grossly WNL.  Psychiatric: mentation appears normal and affect  normal/bright  Hematologic/Lymphatic/Immunologic: Normal cervical lymph nodes        Frailty Screening    1. Weight loss: Have you lost >10 pounds (or >5% body weight) unintentionally over the last year? Yes      2. Exhaustion: How often in the past week did you feel that:  o I feel that everything I do is an effort : .Exhaustion: 0 = rarely or none of the time (<1 day)  o I feel I cannot get going: Exhaustion: 1 = some of the time (1-2 days)    3. Weakness:  Hand  strength (measured by MA; calculate average): 25     Male BMI Frailty Criteria for  Male  Strength Female BMI Frailty Criteria for  Female  Strength   ?24 ?29 ?23 ?17   24.1 - 26 ?30 23.1 - 26 ?17.3   26.1 - 28 ?30 26.1 - 29 ?18   >28 ?32 >29 ?21     4. Slowness:  15 foot walk time (measured by MA):  4    Height Frailty Criteria for  15 Foot Walk Time   Men   ?173 cm ? 7 seconds    >173 cm  ? 6 seconds   Women   ?159 cm ? 7 seconds   >159 cm  ? 6 seconds     5. Physical activity:     *Please complete 2 calculations for kcal (see frailty worksheet for equations)     Energy expenditure for frailty: >2000 kcal expended per week     Gender Frailty Criteria for Low Physical Activity   Male <383 kcal/week   Female <270 kcal/weel     IPAQ score: >2000 MET minutes per week       June Ronquillo met the following criteria for prefrailty (score 1-2) or frailty (score 3+): Frailty: Weight Loss and Weakness  Frailty Score is: 2      Additional assessments not to be used in frailty calculation:   What types of physical activity can you tolerate? Drumming, gardening, walking, ADLs    Sit to stand test (time to complete 5 reps): 12 seconds    Standing balance in 10 seconds:  Record the Total number of seconds(0-30)--add a+b+c ( take best attempt for each)    First attempt: 10 seconds    Second attempt: 10 seconds      Sedated bmbx has H&P 10/14, aware of NPO restrictions      Overall Assessment    I have reviewed the diagnostic data, medications,  frailty screening, and general processes prior to BMTCT.  I have notified the Primary BMT Physician/and or Attending Physician in the clinic of any issues. We also discussed in detail the database and biorepository research for which June Ronquillo is eligible. We discussed the potential risks and potential benefits of each of these protocols individually. We explained potential alternatives to the protocols discussed. We explained to the patient that participation is voluntary and that consent may be withdrawn at any time.       Consents Signed:    Blood transfusion consent form    Ethnicity form    [facility] database    [facility] BMTCT Database    Present during the discussion was June Ronquillo. Copies of the signed consent forms will be provided to the patient on admission. No procedures specific to any studies were performed prior to the patient signing the consent form.    June Ronquillo had the opportunity to ask questions, and I answered all of the questions to the best of my ability.      Deborah Carmona PA-C        Again, thank you for allowing me to participate in the care of your patient.        Sincerely,        BMT Advanced Practice Provider

## 2021-10-13 NOTE — NURSING NOTE
BMT ARMAS Frailty assessment done on patient. Patient had no questions and showed understanding of what assessment was being done. Paper work  given to Deborah Gatica LPN

## 2021-10-13 NOTE — NURSING NOTE
Chief Complaint   Patient presents with     RECHECK     pt here for fraility for pre bmt for MM

## 2021-10-13 NOTE — PROGRESS NOTES
[facility]  BMTCT OPEN VISIT    October 13, 2021        June Ronquillo is a 51 year old male undergoing evaluation prior to hematopoietic cell transplant or immune effector cell therapy.    Reason for BMTCT: Multiple myeloma    Recent chemotherapy: end of Sept 2021    Recent infections: none    Blood thinner use? If yes, why? No    Treatment for diabetes? No          Today, the patient notes the following symptoms:  Review Of Systems  Skin: negative  Eyes: negative  Ears/Nose/Throat: positive for recent URI ending 10/9 about; afebrile throughout  Respiratory: Cough with cold as noted above, productive sputum now fully resolved  Cardiovascular: negative  Gastrointestinal: negative  Genitourinary: negative  Musculoskeletal: neuropathy in feet   Neurologic: negative  Psychiatric: negative  Hematologic/Lymphatic/Immunologic: negative  Endocrine: negative      June Ronquillo's History    Past Medical History:   Diagnosis Date     CAD (coronary artery disease)     s/p stent to CFX     Heart attack (H)      Hyperlipidemia LDL goal <100      Hypertension      Stented coronary artery      Thyroid disease        Past Surgical History:   Procedure Laterality Date     BONE MARROW BIOPSY, BONE SPECIMEN, NEEDLE/TROCAR Left 5/17/2021    Procedure: BIOPSY, BONE MARROW with aspiration and ancillary studies;  Surgeon: Niharika Regalado MD;  Location: [facility] OR     HEART CATH PTCA SINGLE VESSEL  10/10/2004    Stent to X - [facility]        Family History   Problem Relation Age of Onset     Hypertension Mother      Hyperlipidemia Mother      Heart Disease Paternal Grandmother      Hyperlipidemia Sister      Hyperlipidemia Sister        Social History     Tobacco Use     Smoking status: Never Smoker     Smokeless tobacco: Never Used   Substance Use Topics     Alcohol use: Not Currently           June Ronquillo's Medications and Allergies    Current Outpatient Medications   Medication     acyclovir  (ZOVIRAX) 400 MG tablet     aspirin (ASA) 325 MG tablet     calcium carb-cholecalciferol (OS-HARDEEP) 500-200 MG-UNIT tablet     dexamethasone (DECADRON) 4 MG tablet     FISH OIL 1000 MG OR CAPS     hydrochlorothiazide (HYDRODIURIL) 50 MG tablet     LENalidomide (REVLIMID) 25 MG CAPS capsule     levofloxacin (LEVAQUIN) 250 MG tablet     levothyroxine (SYNTHROID/LEVOTHROID) 50 MCG tablet     losartan (COZAAR) 50 MG tablet     order for DME     pantoprazole (PROTONIX) 20 MG EC tablet     potassium chloride ER (KLOR-CON M) 10 MEQ CR tablet     prochlorperazine (COMPAZINE) 10 MG tablet     rosuvastatin (CRESTOR) 40 MG tablet     No current facility-administered medications for this visit.          Allergies   Allergen Reactions     Nkda [No Known Drug Allergies]            Physical Examination    There were no vitals taken for this visit.    Exam:  Constitutional: healthy, alert and no distress  Head: Normocephalic. No masses, lesions, tenderness or abnormalities  ENT: ENT exam normal, no neck nodes or sinus tenderness  Cardiovascular: negative, PMI normal. No lifts, heaves, or thrills. RRR. No murmurs, clicks gallops or rub  Respiratory: negative, Percussion normal. Good diaphragmatic excursion. Lungs clear  Gastrointestinal: Abdomen soft, non-tender. BS normal. No masses, organomegaly  : Deferred  Musculoskeletal: extremities normal- no gross deformities noted, gait normal and normal muscle tone  Skin: no suspicious lesions or rashes  Neurologic: Gait normal. CN II-XII without focal deficits. Reflexes normal and symmetric. Sensation grossly WNL.  Psychiatric: mentation appears normal and affect normal/bright  Hematologic/Lymphatic/Immunologic: Normal cervical lymph nodes        Frailty Screening    1. Weight loss: Have you lost >10 pounds (or >5% body weight) unintentionally over the last year? Yes      2. Exhaustion: How often in the past week did you feel that:  o I feel that everything I do is an effort :  .Exhaustion: 0 = rarely or none of the time (<1 day)  o I feel I cannot get going: Exhaustion: 1 = some of the time (1-2 days)    3. Weakness:  Hand  strength (measured by MA; calculate average): 25     Male BMI Frailty Criteria for  Male  Strength Female BMI Frailty Criteria for  Female  Strength   ?24 ?29 ?23 ?17   24.1 - 26 ?30 23.1 - 26 ?17.3   26.1 - 28 ?30 26.1 - 29 ?18   >28 ?32 >29 ?21     4. Slowness:  15 foot walk time (measured by MA):  4    Height Frailty Criteria for  15 Foot Walk Time   Men   ?173 cm ? 7 seconds    >173 cm  ? 6 seconds   Women   ?159 cm ? 7 seconds   >159 cm  ? 6 seconds     5. Physical activity:     *Please complete 2 calculations for kcal (see frailty worksheet for equations)     Energy expenditure for frailty: >2000 kcal expended per week     Gender Frailty Criteria for Low Physical Activity   Male <383 kcal/week   Female <270 kcal/weel     IPAQ score: >2000 MET minutes per week       June Ronquillo met the following criteria for prefrailty (score 1-2) or frailty (score 3+): Frailty: Weight Loss and Weakness  Frailty Score is: 2      Additional assessments not to be used in frailty calculation:   What types of physical activity can you tolerate? Drumming, gardening, walking, ADLs    Sit to stand test (time to complete 5 reps): 12 seconds    Standing balance in 10 seconds:  Record the Total number of seconds(0-30)--add a+b+c ( take best attempt for each)    First attempt: 10 seconds    Second attempt: 10 seconds      Sedated bmbx has H&P 10/14, aware of NPO restrictions      Overall Assessment    I have reviewed the diagnostic data, medications, frailty screening, and general processes prior to BMTCT.  I have notified the Primary BMT Physician/and or Attending Physician in the clinic of any issues. We also discussed in detail the database and biorepository research for which June Ronquillo is eligible. We discussed the potential risks and potential benefits of  each of these protocols individually. We explained potential alternatives to the protocols discussed. We explained to the patient that participation is voluntary and that consent may be withdrawn at any time.       Consents Signed:    Blood transfusion consent form    Ethnicity form    [facility]    [facility] Database    Present during the discussion was June Ronquillo. Copies of the signed consent forms will be provided to the patient on admission. No procedures specific to any studies were performed prior to the patient signing the consent form.    June Ronquillo had the opportunity to ask questions, and I answered all of the questions to the best of my ability.      Deborah Carmona PA-C

## 2021-10-14 ENCOUNTER — APPOINTMENT (OUTPATIENT)
Dept: LAB | Facility: CLINIC | Age: 51
End: 2021-10-14
Attending: NURSE PRACTITIONER
Payer: COMMERCIAL

## 2021-10-14 ENCOUNTER — HOSPITAL ENCOUNTER (OUTPATIENT)
Facility: AMBULATORY SURGERY CENTER | Age: 51
End: 2021-10-14
Attending: NURSE PRACTITIONER
Payer: COMMERCIAL

## 2021-10-14 ENCOUNTER — ANESTHESIA (OUTPATIENT)
Dept: SURGERY | Facility: AMBULATORY SURGERY CENTER | Age: 51
End: 2021-10-14
Payer: COMMERCIAL

## 2021-10-14 ENCOUNTER — OFFICE VISIT (OUTPATIENT)
Dept: TRANSPLANT | Facility: CLINIC | Age: 51
End: 2021-10-14
Attending: NURSE PRACTITIONER
Payer: COMMERCIAL

## 2021-10-14 VITALS — HEART RATE: 75 BPM

## 2021-10-14 VITALS
HEIGHT: 66 IN | DIASTOLIC BLOOD PRESSURE: 90 MMHG | RESPIRATION RATE: 16 BRPM | SYSTOLIC BLOOD PRESSURE: 142 MMHG | TEMPERATURE: 97.5 F | OXYGEN SATURATION: 100 % | BODY MASS INDEX: 25.51 KG/M2 | WEIGHT: 158.73 LBS | HEART RATE: 73 BPM

## 2021-10-14 VITALS
HEART RATE: 79 BPM | WEIGHT: 160.9 LBS | TEMPERATURE: 98.1 F | DIASTOLIC BLOOD PRESSURE: 85 MMHG | RESPIRATION RATE: 16 BRPM | BODY MASS INDEX: 26.49 KG/M2 | SYSTOLIC BLOOD PRESSURE: 131 MMHG | OXYGEN SATURATION: 98 %

## 2021-10-14 DIAGNOSIS — C90.00 MULTIPLE MYELOMA, REMISSION STATUS UNSPECIFIED (H): ICD-10-CM

## 2021-10-14 DIAGNOSIS — C90.00 MULTIPLE MYELOMA NOT HAVING ACHIEVED REMISSION (H): ICD-10-CM

## 2021-10-14 DIAGNOSIS — Z86.2 PERSONAL HISTORY OF DISEASES OF BLOOD AND BLOOD-FORMING ORGANS: ICD-10-CM

## 2021-10-14 DIAGNOSIS — C90.00 MULTIPLE MYELOMA, REMISSION STATUS UNSPECIFIED (H): Primary | ICD-10-CM

## 2021-10-14 LAB
ALBUMIN SERPL ELPH-MCNC: 4.7 G/DL (ref 3.7–5.1)
ALBUMIN SERPL-MCNC: 4.1 G/DL (ref 3.4–5)
ALP SERPL-CCNC: 66 U/L (ref 40–150)
ALPHA1 GLOB SERPL ELPH-MCNC: 0.3 G/DL (ref 0.2–0.4)
ALPHA2 GLOB SERPL ELPH-MCNC: 0.8 G/DL (ref 0.5–0.9)
ALT SERPL W P-5'-P-CCNC: 15 U/L (ref 0–70)
ANION GAP SERPL CALCULATED.3IONS-SCNC: 6 MMOL/L (ref 3–14)
AST SERPL W P-5'-P-CCNC: 15 U/L (ref 0–45)
ATRIAL RATE - MUSE: 85 BPM
B-GLOBULIN SERPL ELPH-MCNC: 0.8 G/DL (ref 0.6–1)
B2 MICROGLOB TUMOR MARKER SER-MCNC: 1.9 MG/L
BASOPHILS # BLD AUTO: 0.1 10E3/UL (ref 0–0.2)
BASOPHILS NFR BLD AUTO: 1 %
BILIRUB SERPL-MCNC: 0.6 MG/DL (ref 0.2–1.3)
BUN SERPL-MCNC: 16 MG/DL (ref 7–30)
CALCIUM SERPL-MCNC: 9 MG/DL (ref 8.5–10.1)
CHLORIDE BLD-SCNC: 109 MMOL/L (ref 94–109)
CO2 SERPL-SCNC: 25 MMOL/L (ref 20–32)
CREAT SERPL-MCNC: 1.01 MG/DL (ref 0.66–1.25)
DIASTOLIC BLOOD PRESSURE - MUSE: NORMAL MMHG
DLCOCOR-%PRED-PRE: 70 %
DLCOCOR-PRE: 17.4 ML/MIN/MMHG
DLCOUNC-%PRED-PRE: 66 %
DLCOUNC-PRE: 16.33 ML/MIN/MMHG
DLCOUNC-PRED: 24.6 ML/MIN/MMHG
EBV VCA IGG SER IA-ACNC: 65.3 U/ML
EBV VCA IGG SER IA-ACNC: POSITIVE
EOSINOPHIL # BLD AUTO: 0.4 10E3/UL (ref 0–0.7)
EOSINOPHIL NFR BLD AUTO: 5 %
ERV-%PRED-PRE: 73 %
ERV-PRE: 0.74 L
ERV-PRED: 1.01 L
ERYTHROCYTE [DISTWIDTH] IN BLOOD BY AUTOMATED COUNT: 14.9 % (ref 10–15)
EXPTIME-PRE: 5.67 SEC
FEF2575-%PRED-POST: 163 %
FEF2575-%PRED-PRE: 85 %
FEF2575-POST: 4.67 L/SEC
FEF2575-PRE: 2.44 L/SEC
FEF2575-PRED: 2.85 L/SEC
FEFMAX-%PRED-PRE: 99 %
FEFMAX-PRE: 8.57 L/SEC
FEFMAX-PRED: 8.59 L/SEC
FEV1-%PRED-PRE: 67 %
FEV1-PRE: 2.05 L
FEV1FEV6-PRE: 84 %
FEV1FEV6-PRED: 80 %
FEV1FVC-PRE: 84 %
FEV1FVC-PRED: 80 %
FEV1SVC-PRE: 70 %
FEV1SVC-PRED: 71 %
FIFMAX-PRE: 4.49 L/SEC
FRCPLETH-%PRED-PRE: 65 %
FRCPLETH-PRE: 2.12 L
FRCPLETH-PRED: 3.25 L
FVC-%PRED-PRE: 64 %
FVC-PRE: 2.44 L
FVC-PRED: 3.77 L
GAMMA GLOB SERPL ELPH-MCNC: 0.5 G/DL (ref 0.7–1.6)
GFR SERPL CREATININE-BSD FRML MDRD: 85 ML/MIN/1.73M2
GLUCOSE BLD-MCNC: 110 MG/DL (ref 70–99)
HCT VFR BLD AUTO: 38.1 % (ref 40–53)
HGB BLD-MCNC: 13.5 G/DL (ref 13.3–17.7)
HSV1 IGG SERPL QL IA: 10.6 INDEX
HSV1 IGG SERPL QL IA: ABNORMAL
HSV2 IGG SERPL QL IA: 0.05 INDEX
HSV2 IGG SERPL QL IA: ABNORMAL
IC-%PRED-PRE: 67 %
IC-PRE: 2.2 L
IC-PRED: 3.27 L
IGA SERPL-MCNC: 52 MG/DL (ref 84–499)
IGE SERPL-ACNC: <2 KU/L (ref 0–114)
IGG SERPL-MCNC: 472 MG/DL (ref 610–1616)
IGM SERPL-MCNC: 14 MG/DL (ref 35–242)
IMM GRANULOCYTES # BLD: 0 10E3/UL
IMM GRANULOCYTES NFR BLD: 0 %
INR PPP: 0.95 (ref 0.85–1.15)
INTERPRETATION ECG - MUSE: NORMAL
KAPPA LC FREE SER-MCNC: 0.66 MG/DL (ref 0.33–1.94)
KAPPA LC FREE/LAMBDA FREE SER NEPH: 1.05 {RATIO} (ref 0.26–1.65)
LAMBDA LC FREE SERPL-MCNC: 0.63 MG/DL (ref 0.57–2.63)
LYMPHOCYTES # BLD AUTO: 1.6 10E3/UL (ref 0.8–5.3)
LYMPHOCYTES NFR BLD AUTO: 25 %
M PROTEIN SERPL ELPH-MCNC: 0.1 G/DL
MCH RBC QN AUTO: 32.8 PG (ref 26.5–33)
MCHC RBC AUTO-ENTMCNC: 35.4 G/DL (ref 31.5–36.5)
MCV RBC AUTO: 93 FL (ref 78–100)
MONOCYTES # BLD AUTO: 0.7 10E3/UL (ref 0–1.3)
MONOCYTES NFR BLD AUTO: 11 %
NEUTROPHILS # BLD AUTO: 3.8 10E3/UL (ref 1.6–8.3)
NEUTROPHILS NFR BLD AUTO: 58 %
NRBC # BLD AUTO: 0 10E3/UL
NRBC BLD AUTO-RTO: 0 /100
P AXIS - MUSE: 64 DEGREES
PLATELET # BLD AUTO: 489 10E3/UL (ref 150–450)
POTASSIUM BLD-SCNC: 3.6 MMOL/L (ref 3.4–5.3)
PR INTERVAL - MUSE: 140 MS
PROT PATTERN SERPL ELPH-IMP: ABNORMAL
PROT PATTERN SERPL IFE-IMP: NORMAL
PROT SERPL-MCNC: 7.2 G/DL (ref 6.8–8.8)
QRS DURATION - MUSE: 86 MS
QT - MUSE: 382 MS
QTC - MUSE: 454 MS
R AXIS - MUSE: 16 DEGREES
RBC # BLD AUTO: 4.11 10E6/UL (ref 4.4–5.9)
RVPLETH-%PRED-PRE: 65 %
RVPLETH-PRE: 1.38 L
RVPLETH-PRED: 2.1 L
SODIUM SERPL-SCNC: 140 MMOL/L (ref 133–144)
SYSTOLIC BLOOD PRESSURE - MUSE: NORMAL MMHG
T AXIS - MUSE: 29 DEGREES
TLCPLETH-%PRED-PRE: 70 %
TLCPLETH-PRE: 4.32 L
TLCPLETH-PRED: 6.11 L
VA-%PRED-PRE: 72 %
VA-PRE: 4 L
VC-%PRED-PRE: 68 %
VC-PRE: 2.94 L
VC-PRED: 4.28 L
VENTRICULAR RATE- MUSE: 85 BPM
WBC # BLD AUTO: 6.5 10E3/UL (ref 4–11)

## 2021-10-14 PROCEDURE — 88341 IMHCHEM/IMCYTCHM EA ADD ANTB: CPT | Mod: 26 | Performed by: STUDENT IN AN ORGANIZED HEALTH CARE EDUCATION/TRAINING PROGRAM

## 2021-10-14 PROCEDURE — 85004 AUTOMATED DIFF WBC COUNT: CPT

## 2021-10-14 PROCEDURE — 85610 PROTHROMBIN TIME: CPT

## 2021-10-14 PROCEDURE — 80053 COMPREHEN METABOLIC PANEL: CPT

## 2021-10-14 PROCEDURE — 38222 DX BONE MARROW BX & ASPIR: CPT

## 2021-10-14 PROCEDURE — G0463 HOSPITAL OUTPT CLINIC VISIT: HCPCS

## 2021-10-14 PROCEDURE — 88369 M/PHMTRC ALYSISHQUANT/SEMIQ: CPT | Mod: 26 | Performed by: MEDICAL GENETICS

## 2021-10-14 PROCEDURE — 85060 BLOOD SMEAR INTERPRETATION: CPT | Performed by: STUDENT IN AN ORGANIZED HEALTH CARE EDUCATION/TRAINING PROGRAM

## 2021-10-14 PROCEDURE — 36415 COLL VENOUS BLD VENIPUNCTURE: CPT

## 2021-10-14 PROCEDURE — 88188 FLOWCYTOMETRY/READ 9-15: CPT | Performed by: PATHOLOGY

## 2021-10-14 PROCEDURE — 88237 TISSUE CULTURE BONE MARROW: CPT

## 2021-10-14 PROCEDURE — 88311 DECALCIFY TISSUE: CPT | Mod: TC

## 2021-10-14 PROCEDURE — 85097 BONE MARROW INTERPRETATION: CPT | Performed by: STUDENT IN AN ORGANIZED HEALTH CARE EDUCATION/TRAINING PROGRAM

## 2021-10-14 PROCEDURE — 88342 IMHCHEM/IMCYTCHM 1ST ANTB: CPT | Mod: 26 | Performed by: STUDENT IN AN ORGANIZED HEALTH CARE EDUCATION/TRAINING PROGRAM

## 2021-10-14 PROCEDURE — 88185 FLOWCYTOMETRY/TC ADD-ON: CPT

## 2021-10-14 PROCEDURE — 88275 CYTOGENETICS 100-300: CPT

## 2021-10-14 PROCEDURE — 88311 DECALCIFY TISSUE: CPT | Mod: 26 | Performed by: STUDENT IN AN ORGANIZED HEALTH CARE EDUCATION/TRAINING PROGRAM

## 2021-10-14 PROCEDURE — 88368 INSITU HYBRIDIZATION MANUAL: CPT | Mod: 26 | Performed by: MEDICAL GENETICS

## 2021-10-14 PROCEDURE — 88271 CYTOGENETICS DNA PROBE: CPT

## 2021-10-14 PROCEDURE — 38222 DX BONE MARROW BX & ASPIR: CPT | Mod: LT

## 2021-10-14 PROCEDURE — 88305 TISSUE EXAM BY PATHOLOGIST: CPT | Mod: TC

## 2021-10-14 RX ORDER — LIDOCAINE 40 MG/G
CREAM TOPICAL
Status: DISCONTINUED | OUTPATIENT
Start: 2021-10-14 | End: 2021-10-15 | Stop reason: HOSPADM

## 2021-10-14 RX ORDER — SODIUM CHLORIDE, SODIUM LACTATE, POTASSIUM CHLORIDE, CALCIUM CHLORIDE 600; 310; 30; 20 MG/100ML; MG/100ML; MG/100ML; MG/100ML
INJECTION, SOLUTION INTRAVENOUS CONTINUOUS
Status: DISCONTINUED | OUTPATIENT
Start: 2021-10-14 | End: 2021-10-15 | Stop reason: HOSPADM

## 2021-10-14 RX ORDER — LIDOCAINE HYDROCHLORIDE 10 MG/ML
8-10 INJECTION, SOLUTION EPIDURAL; INFILTRATION; INTRACAUDAL; PERINEURAL
Status: DISCONTINUED | OUTPATIENT
Start: 2021-10-14 | End: 2021-10-15 | Stop reason: HOSPADM

## 2021-10-14 RX ORDER — HYDROMORPHONE HYDROCHLORIDE 1 MG/ML
0.4 INJECTION, SOLUTION INTRAMUSCULAR; INTRAVENOUS; SUBCUTANEOUS EVERY 5 MIN PRN
Status: DISCONTINUED | OUTPATIENT
Start: 2021-10-14 | End: 2021-10-15 | Stop reason: HOSPADM

## 2021-10-14 RX ORDER — FENTANYL CITRATE 50 UG/ML
50 INJECTION, SOLUTION INTRAMUSCULAR; INTRAVENOUS EVERY 5 MIN PRN
Status: DISCONTINUED | OUTPATIENT
Start: 2021-10-14 | End: 2021-10-15 | Stop reason: HOSPADM

## 2021-10-14 RX ORDER — ACETAMINOPHEN 325 MG/1
975 TABLET ORAL ONCE
Status: COMPLETED | OUTPATIENT
Start: 2021-10-14 | End: 2021-10-14

## 2021-10-14 RX ORDER — ONDANSETRON 2 MG/ML
4 INJECTION INTRAMUSCULAR; INTRAVENOUS EVERY 30 MIN PRN
Status: DISCONTINUED | OUTPATIENT
Start: 2021-10-14 | End: 2021-10-15 | Stop reason: HOSPADM

## 2021-10-14 RX ORDER — OXYCODONE HYDROCHLORIDE 5 MG/1
5 TABLET ORAL EVERY 4 HOURS PRN
Status: DISCONTINUED | OUTPATIENT
Start: 2021-10-14 | End: 2021-10-15 | Stop reason: HOSPADM

## 2021-10-14 RX ORDER — PROPOFOL 10 MG/ML
INJECTION, EMULSION INTRAVENOUS CONTINUOUS PRN
Status: DISCONTINUED | OUTPATIENT
Start: 2021-10-14 | End: 2021-10-14

## 2021-10-14 RX ORDER — PROPOFOL 10 MG/ML
INJECTION, EMULSION INTRAVENOUS PRN
Status: DISCONTINUED | OUTPATIENT
Start: 2021-10-14 | End: 2021-10-14

## 2021-10-14 RX ORDER — ONDANSETRON 4 MG/1
4 TABLET, ORALLY DISINTEGRATING ORAL EVERY 30 MIN PRN
Status: DISCONTINUED | OUTPATIENT
Start: 2021-10-14 | End: 2021-10-15 | Stop reason: HOSPADM

## 2021-10-14 RX ORDER — LABETALOL HYDROCHLORIDE 5 MG/ML
10 INJECTION, SOLUTION INTRAVENOUS
Status: DISCONTINUED | OUTPATIENT
Start: 2021-10-14 | End: 2021-10-15 | Stop reason: HOSPADM

## 2021-10-14 RX ADMIN — ACETAMINOPHEN 975 MG: 325 TABLET ORAL at 11:05

## 2021-10-14 RX ADMIN — SODIUM CHLORIDE, SODIUM LACTATE, POTASSIUM CHLORIDE, CALCIUM CHLORIDE: 600; 310; 30; 20 INJECTION, SOLUTION INTRAVENOUS at 11:07

## 2021-10-14 RX ADMIN — PROPOFOL 50 MG: 10 INJECTION, EMULSION INTRAVENOUS at 11:49

## 2021-10-14 RX ADMIN — PROPOFOL 125 MCG/KG/MIN: 10 INJECTION, EMULSION INTRAVENOUS at 11:50

## 2021-10-14 ASSESSMENT — PAIN SCALES - GENERAL: PAINLEVEL: MILD PAIN (3)

## 2021-10-14 ASSESSMENT — MIFFLIN-ST. JEOR: SCORE: 1507.75

## 2021-10-14 NOTE — ANESTHESIA CARE TRANSFER NOTE
Patient: June Ronquillo    Procedure: Procedure(s):  BIOPSY, BONE MARROW       Diagnosis: Multiple myeloma not having achieved remission (H) [C90.00]  Diagnosis Additional Information: No value filed.    Anesthesia Type:   No value filed.     Note:    Oropharynx: oropharynx clear of all foreign objects  Level of Consciousness: awake  Oxygen Supplementation: room air    Independent Airway: airway patency satisfactory and stable  Dentition: dentition unchanged  Vital Signs Stable: post-procedure vital signs reviewed and stable  Report to RN Given: handoff report given  Patient transferred to: Phase II    Handoff Report: Identifed the Patient, Identified the Reponsible Provider, Reviewed the pertinent medical history, Discussed the surgical course, Reviewed Intra-OP anesthesia mangement and issues during anesthesia, Set expectations for post-procedure period and Allowed opportunity for questions and acknowledgement of understanding      Vitals:  Vitals Value Taken Time   BP     Temp     Pulse     Resp     SpO2         Electronically Signed By: PATTY Laws CRNA  October 14, 2021  12:08 PM

## 2021-10-14 NOTE — PROGRESS NOTES
Patient Name: June Ronquillo  Patient MRN: [id number]  Patient : 1970    Abbreviated H&P and Pre-sedation Assessment for bone marrow biopsy (procedure name) with sedation    Chief complaint and/or reason for Procedure: multiple myeloma    Planned level of sedation: Minimal - moderate sedation    History of problems with sedation: (patient or family hx): No    ASA Assessment Category: 2 - Mild systemic disease    History of sleep apnea: Yes; does not use CPAP.  It is mild.     History of blood thinners: Yes; takes aspirin 325mg daily; last dose 10/12.  Takes fishoil 1g bid; last dose 10/12 evening dose.      Appropriate NPO status: Yes.  Last food intake was 7pm on 10/13; last fluid was a cup of water at 8am.    Current tobacco use: No    Any recent fever, cough, chest or sinus congestion, SOB, chest pain, diarrhea or constipation. No    Medications   Currently Scheduled Medications       Home Med List)  (Not in a hospital admission)      Allergies  Blood transfusion related (informational only) and Nkda [no known drug allergies]    PMH:  Past Medical History:   Diagnosis Date     CAD (coronary artery disease)     s/p stent to CFX     Heart attack (H)      Hyperlipidemia LDL goal <100      Hypertension      Stented coronary artery      Thyroid disease        Past Surgical History:   Procedure Laterality Date     BONE MARROW BIOPSY, BONE SPECIMEN, NEEDLE/TROCAR Left 2021    Procedure: BIOPSY, BONE MARROW with aspiration and ancillary studies;  Surgeon: Niharika Regalado MD;  Location: [facility] OR     HEART CATH PTCA SINGLE VESSEL  10/10/2004    Stent to CFX - [facility]        Focused Physical exam pertinent to procedure:          (Details of heart, lung, ASA assessment and mallampati assessment in pre procedure assessment flowsheet)  General- healthy, alert, active and no distress   Recent vital signs-  /85 (BP Location: Right arm, Patient Position: Sitting, Cuff Size: Adult Regular)    Pulse 79   Temp 98.1  F (36.7  C) (Oral)   Resp 16   Wt 73 kg (160 lb 14.4 oz)   SpO2 98%   BMI 26.49 kg/m    HEART-regular rate and rhythm and no murmurs, gallops, or rub  LUNGS-Clear to Ausculation  OROPHARYNGEAL - MALLAMPATTI- Class II (visualization of the soft palate, fauces, and uvula)    A/P:Reviewed history, medications, allergies, clinical information and pre procedure assessment. The patient was informed of the risks and benefits of the procedure.  They would like to proceed.  June Ronquillo is approved for use of sedation during their procedure as noted above.      MD segun Vance PA-C

## 2021-10-14 NOTE — H&P (VIEW-ONLY)
Patient Name: June Ronquillo  Patient MRN: [id number]  Patient : 1970    Abbreviated H&P and Pre-sedation Assessment for bone marrow biopsy (procedure name) with sedation    Chief complaint and/or reason for Procedure: multiple myeloma    Planned level of sedation: Minimal - moderate sedation    History of problems with sedation: (patient or family hx): No    ASA Assessment Category: 2 - Mild systemic disease    History of sleep apnea: Yes; does not use CPAP.  It is mild.     History of blood thinners: Yes; takes aspirin 325mg daily; last dose 10/12.  Takes fishoil 1g bid; last dose 10/12 evening dose.      Appropriate NPO status: Yes.  Last food intake was 7pm on 10/13; last fluid was a cup of water at 8am.    Current tobacco use: No    Any recent fever, cough, chest or sinus congestion, SOB, chest pain, diarrhea or constipation. No    Medications   Currently Scheduled Medications       Home Med List)  (Not in a hospital admission)      Allergies  Blood transfusion related (informational only) and Nkda [no known drug allergies]    PMH:  Past Medical History:   Diagnosis Date     CAD (coronary artery disease)     s/p stent to CFX     Heart attack (H)      Hyperlipidemia LDL goal <100      Hypertension      Stented coronary artery      Thyroid disease        Past Surgical History:   Procedure Laterality Date     BONE MARROW BIOPSY, BONE SPECIMEN, NEEDLE/TROCAR Left 2021    Procedure: BIOPSY, BONE MARROW with aspiration and ancillary studies;  Surgeon: Niharika Regalado MD;  Location: [facility] OR     HEART CATH PTCA SINGLE VESSEL  10/10/2004    Stent to CFX - [facility]        Focused Physical exam pertinent to procedure:          (Details of heart, lung, ASA assessment and mallampati assessment in pre procedure assessment flowsheet)  General- healthy, alert, active and no distress   Recent vital signs-  /85 (BP Location: Right arm, Patient Position: Sitting, Cuff Size: Adult Regular)    Pulse 79   Temp 98.1  F (36.7  C) (Oral)   Resp 16   Wt 73 kg (160 lb 14.4 oz)   SpO2 98%   BMI 26.49 kg/m    HEART-regular rate and rhythm and no murmurs, gallops, or rub  LUNGS-Clear to Ausculation  OROPHARYNGEAL - MALLAMPATTI- Class II (visualization of the soft palate, fauces, and uvula)    A/P:Reviewed history, medications, allergies, clinical information and pre procedure assessment. The patient was informed of the risks and benefits of the procedure.  They would like to proceed.  June Ronquillo is approved for use of sedation during their procedure as noted above.      MD segun Vance PA-C

## 2021-10-14 NOTE — DISCHARGE INSTRUCTIONS
How to Care for your Bone Marrow Biopsy    Activity  Relax and take it easy for the next 24 hours.   Resume regular activity after 24 hours.    Diet   Resume pre-procedure diet and drink plenty of fluids.    If you received sedation, you may feel a little nauseated so start with a clear liquid diet until the nausea passes.    Do Not Immerse Bone Marrow Biopsy Puncture Site in Water  Do not take a bath until the puncture site has healed.  Do not sit in a hot tub or spa until the puncture site has healed.  Do not swim until the puncture site has healed.  Wait 24 hours before taking a shower.    Drainage  Drainage should be minimal.  IF bleeding should occur and soaks through the dressing, lie down and put pressure on the puncture site.    IF bleeding persists, apply gentle pressure with your hand over the dressing for 5 minutes.    IF the pressure doesn't stop the bleeding, contact your provider immediately.    Dressing  Keep the dressing dry and in place for 24 hours, unless instructed otherwise.    IF bleeding soaks through the dressing in the first 24 hours do NOT remove the dressing as you may pull off any scab that has formed.  Instead, reinforce the dressing with extra gauze and tape.    No Alcohol  Do not drink alcoholic beverages for the next 24 hours.    No Driving or Operating Machinery  No driving or operating machinery for the next 24 hours.    Notify your provider IF:    Excessive bleeding or drainage at the puncture site    Excessive swelling, redness or tenderness at the puncture site    Fever above 100.5 degrees taken orally    Severe pain    Drainage that is green, yellow, thick white or has a bad odor    Telephone Numbers  Bone Marrow transplant clinic:  [phone] (Monday thru Friday, 8:00 am to 4:00 pm)  After business hours call the [facility]:  [phone] and ask for the Hematology/BMT doctor on call.  Or call the Emergency Room at the [facility]  [facility]:  [phone].

## 2021-10-14 NOTE — LETTER
10/14/2021         RE: June Ronquillo  [address]  [place] [address]        Dear Colleague,    Thank you for referring your patient, June Ronquillo, to the [facility] BLOOD AND MARROW TRANSPLANT PROGRAM [place]. Please see a copy of my visit note below.    Patient Name: June Ronquillo  Patient MRN: [id number]  Patient : 1970    Abbreviated H&P and Pre-sedation Assessment for bone marrow biopsy (procedure name) with sedation    Chief complaint and/or reason for Procedure: multiple myeloma    Planned level of sedation: Minimal - moderate sedation    History of problems with sedation: (patient or family hx): No    ASA Assessment Category: 2 - Mild systemic disease    History of sleep apnea: Yes; does not use CPAP.  It is mild.     History of blood thinners: Yes; takes aspirin 325mg daily; last dose 10/12.  Takes fishoil 1g bid; last dose 10/12 evening dose.      Appropriate NPO status: Yes.  Last food intake was 7pm on 10/13; last fluid was a cup of water at 8am.    Current tobacco use: No    Any recent fever, cough, chest or sinus congestion, SOB, chest pain, diarrhea or constipation. No    Medications   Currently Scheduled Medications       Home Med List)  (Not in a hospital admission)      Allergies  Blood transfusion related (informational only) and Nkda [no known drug allergies]    PMH:  Past Medical History:   Diagnosis Date     CAD (coronary artery disease)     s/p stent to CFX     Heart attack (H)      Hyperlipidemia LDL goal <100      Hypertension      Stented coronary artery      Thyroid disease        Past Surgical History:   Procedure Laterality Date     BONE MARROW BIOPSY, BONE SPECIMEN, NEEDLE/TROCAR Left 2021    Procedure: BIOPSY, BONE MARROW with aspiration and ancillary studies;  Surgeon: Niharika Regalado MD;  Location: [facility] OR     HEART CATH PTCA SINGLE VESSEL  10/10/2004    Stent to CFX - [facility]        Focused Physical exam pertinent  to procedure:          (Details of heart, lung, ASA assessment and mallampati assessment in pre procedure assessment flowsheet)  General- healthy, alert, active and no distress   Recent vital signs-  /85 (BP Location: Right arm, Patient Position: Sitting, Cuff Size: Adult Regular)   Pulse 79   Temp 98.1  F (36.7  C) (Oral)   Resp 16   Wt 73 kg (160 lb 14.4 oz)   SpO2 98%   BMI 26.49 kg/m    HEART-regular rate and rhythm and no murmurs, gallops, or rub  LUNGS-Clear to Ausculation  OROPHARYNGEAL - MALLAMPATTI- Class II (visualization of the soft palate, fauces, and uvula)    A/P:Reviewed history, medications, allergies, clinical information and pre procedure assessment. The patient was informed of the risks and benefits of the procedure.  They would like to proceed.  Kurtisdorianmatthias Andersonn is approved for use of sedation during their procedure as noted above.      MD segun Vance PA-C        Again, thank you for allowing me to participate in the care of your patient.        Sincerely,        [facility] Advanced Practice Provider

## 2021-10-14 NOTE — ANESTHESIA POSTPROCEDURE EVALUATION
Patient: June Ronquillo    Procedure: Procedure(s):  BIOPSY, BONE MARROW       Diagnosis:Multiple myeloma not having achieved remission (H) [C90.00]  Diagnosis Additional Information: No value filed.    Anesthesia Type:  MAC    Note:  Disposition: Outpatient   Postop Pain Control: Uneventful            Sign Out: Well controlled pain   PONV: No   Neuro/Psych: Uneventful            Sign Out: Acceptable/Baseline neuro status   Airway/Respiratory: Uneventful            Sign Out: Acceptable/Baseline resp. status   CV/Hemodynamics: Uneventful            Sign Out: Acceptable CV status; No obvious hypovolemia; No obvious fluid overload   Other NRE: NONE   DID A NON-ROUTINE EVENT OCCUR? No           Last vitals:  Vitals Value Taken Time   /90 10/14/21 1309   Temp 36.4  C (97.5  F) 10/14/21 1309   Pulse 73 10/14/21 1309   Resp 16 10/14/21 1309   SpO2 100 % 10/14/21 1309       Electronically Signed By: Willie Lofton MD  October 14, 2021  6:37 PM

## 2021-10-14 NOTE — NURSING NOTE
Chief Complaint   Patient presents with     Blood Draw     Labs drawn from PIV placed in lab by RN. VS taken.      Labs drawn from PIV placed by RN. Line flushed with saline. Vitals taken. Pt checked in for appointment(s).  Braulio Lindsay RN

## 2021-10-14 NOTE — ANESTHESIA PREPROCEDURE EVALUATION
Anesthesia Pre-Procedure Evaluation    Patient: June Ronquillo   MRN: [id number] : 1970        Preoperative Diagnosis: Multiple myeloma not having achieved remission (H) [C90.00]    Procedure : Procedure(s):  BIOPSY, BONE MARROW          Past Medical History:   Diagnosis Date     CAD (coronary artery disease)     s/p stent to CFX     Heart attack (H)      Hyperlipidemia LDL goal <100      Hypertension      Stented coronary artery      Thyroid disease       Past Surgical History:   Procedure Laterality Date     BONE MARROW BIOPSY, BONE SPECIMEN, NEEDLE/TROCAR Left 2021    Procedure: BIOPSY, BONE MARROW with aspiration and ancillary studies;  Surgeon: Niharika Regalado MD;  Location: [facility] OR     HEART CATH PTCA SINGLE VESSEL  10/10/2004    Stent to CFX - [facility]       Allergies   Allergen Reactions     Blood Transfusion Related (Informational Only) Other (See Comments)     Patient has a history of a clinically significant antibody against RBC antigens.  A delay in compatible RBCs may occur.      Nkda [No Known Drug Allergies]       Social History     Tobacco Use     Smoking status: Never Smoker     Smokeless tobacco: Never Used   Substance Use Topics     Alcohol use: Not Currently      Wt Readings from Last 1 Encounters:   10/14/21 72 kg (158 lb 11.7 oz)        Anesthesia Evaluation   Pt has had prior anesthetic.     No history of anesthetic complications       ROS/MED HX  ENT/Pulmonary:  - neg pulmonary ROS     Neurologic:  - neg neurologic ROS     Cardiovascular: Comment: s/p stent to CFX    (+) Dyslipidemia hypertension--CAD --stent-Drug Eluting Stent.     METS/Exercise Tolerance: >4 METS    Hematologic:  - neg hematologic  ROS     Musculoskeletal:       GI/Hepatic:  - neg GI/hepatic ROS     Renal/Genitourinary:  - neg Renal ROS     Endo:     (+) thyroid problem,  Thyroid disease - Other,     Psychiatric/Substance Use:  - neg psychiatric ROS     Infectious Disease:  - neg infectious  disease ROS     Malignancy:   (+) Malignancy, History of Lymphoma/Leukemia.    Other:  - neg other ROS          Physical Exam    Airway  airway exam normal           Respiratory Devices and Support         Dental  no notable dental history         Cardiovascular   cardiovascular exam normal          Pulmonary   pulmonary exam normal                OUTSIDE LABS:  CBC:   Lab Results   Component Value Date    WBC 6.5 10/14/2021    WBC 7.6 10/13/2021    HGB 13.5 10/14/2021    HGB 12.6 (L) 10/13/2021    HCT 38.1 (L) 10/14/2021    HCT 38.0 (L) 10/13/2021     (H) 10/14/2021     (H) 10/13/2021     BMP:   Lab Results   Component Value Date     10/14/2021     10/13/2021    POTASSIUM 3.6 10/14/2021    POTASSIUM 3.5 10/13/2021    CHLORIDE 109 10/14/2021    CHLORIDE 109 10/13/2021    CO2 25 10/14/2021    CO2 23 10/13/2021    BUN 16 10/14/2021    BUN 12 10/13/2021    CR 1.01 10/14/2021    CR 0.99 10/13/2021     (H) 10/14/2021     (H) 10/13/2021     COAGS:   Lab Results   Component Value Date    PTT 31 10/13/2021    INR 0.95 10/14/2021     POC: No results found for: BGM, HCG, HCGS  HEPATIC:   Lab Results   Component Value Date    ALBUMIN 4.1 10/14/2021    PROTTOTAL 7.2 10/14/2021    ALT 15 10/14/2021    AST 15 10/14/2021    ALKPHOS 66 10/14/2021    BILITOTAL 0.6 10/14/2021     OTHER:   Lab Results   Component Value Date    A1C 6.0 (H) 01/25/2021    HARDEEP 9.0 10/14/2021    PHOS 2.2 (L) 10/13/2021    MAG 2.2 10/13/2021    TSH 3.96 05/03/2021    T4 0.78 05/03/2021    CRP <2.9 04/30/2021       Anesthesia Plan    ASA Status:  3   NPO Status:  NPO Appropriate    Anesthesia Type: MAC.     - Reason for MAC: straight local not clinically adequate              Consents    Anesthesia Plan(s) and associated risks, benefits, and realistic alternatives discussed. Questions answered and patient/representative(s) expressed understanding.     - Discussed with:  Patient         Postoperative Care    Pain  management: IV analgesics, Oral pain medications, Multi-modal analgesia.   PONV prophylaxis: Ondansetron (or other 5HT-3)     Comments:                Willie Lofton MD

## 2021-10-14 NOTE — NURSING NOTE
"Oncology Rooming Note    October 14, 2021 9:58 AM   June Ronquillo is a 51 year old male who presents for:    Chief Complaint   Patient presents with     Blood Draw     Labs drawn from PIV placed in lab by RN. VS taken.      Oncology Clinic Visit     multiple myeloma     Initial Vitals: /85 (BP Location: Right arm, Patient Position: Sitting, Cuff Size: Adult Regular)   Pulse 79   Temp 98.1  F (36.7  C) (Oral)   Resp 16   Wt 73 kg (160 lb 14.4 oz)   SpO2 98%   BMI 26.49 kg/m   Estimated body mass index is 26.49 kg/m  as calculated from the following:    Height as of 10/13/21: 1.66 m (5' 5.35\").    Weight as of this encounter: 73 kg (160 lb 14.4 oz). Body surface area is 1.83 meters squared.  Mild Pain (3) Comment: Data Unavailable   No LMP for male patient.  Allergies reviewed: Yes  Medications reviewed: Yes    Medications: Medication refills not needed today.  Pharmacy name entered into [employer]:    [facility] [place], [place] - [address]  [facility] [place], [place] - [address].    Clinical concerns: none       Kathy Xavier CMA            "

## 2021-10-14 NOTE — PROGRESS NOTES
BMT ONC Adult Bone Marrow Biopsy Procedure Note  October 14, 2021  There were no vitals taken for this visit.     Learning needs assessment complete within 12 months? YES    DIAGNOSIS: MM     PROCEDURE: Unilateral Bone Marrow Biopsy and Unilateral Aspirate    LOCATION: [facility] 5th floor-Procedure Room    Patient s identification was positively verified by verbal identification and invasive procedure safety checklist was completed. Informed consent was obtained. Following the administration of Propofol as pre-medication, patient was placed in the prone position and prepped and draped in a sterile manner. Approximately 10 cc of 1% Lidocaine was used over the left posterior iliac spine. Following this a 3 mm incision was made. Trephine bone marrow core(s) was (were) obtained from the IC. Bone marrow aspirates were obtained from the [facility]. Aspirates were sent for morphology, immunophenotyping and cytogenetics. A total of approximately 20 ml of marrow was aspirated. Following this procedure a sterile dressing was applied to the bone marrow biopsy site(s). The patient was placed in the supine position to maintain pressure on the biopsy site. Post-procedure wound care instructions were given.     Complications: NO    Pre-procedural pain: 0 out of 10 on the numeric pain rating scale.     Procedural pain: unable to assess due to sedation     Post-procedural pain assessment: unable to assess due to sedation     Interventions: NO    Length of procedure:20 minutes or less      Procedure performed by: Alexa Albert

## 2021-10-14 NOTE — LETTER
10/14/2021         RE: June Ronquillo  [address]  [place] [address]        Dear Colleague,    Thank you for referring your patient, June Ronquillo, to the [facility] BLOOD AND MARROW TRANSPLANT PROGRAM [place]. Please see a copy of my visit note below.    BMT ONC Adult Bone Marrow Biopsy Procedure Note  October 14, 2021  There were no vitals taken for this visit.     Learning needs assessment complete within 12 months? YES    DIAGNOSIS: MM     PROCEDURE: Unilateral Bone Marrow Biopsy and Unilateral Aspirate    LOCATION: [facility] 5th floor-Procedure Room    Patient s identification was positively verified by verbal identification and invasive procedure safety checklist was completed. Informed consent was obtained. Following the administration of Propofol as pre-medication, patient was placed in the prone position and prepped and draped in a sterile manner. Approximately 10 cc of 1% Lidocaine was used over the left posterior iliac spine. Following this a 3 mm incision was made. Trephine bone marrow core(s) was (were) obtained from the [facility]. Bone marrow aspirates were obtained from the [facility]. Aspirates were sent for morphology, immunophenotyping and cytogenetics. A total of approximately 20 ml of marrow was aspirated. Following this procedure a sterile dressing was applied to the bone marrow biopsy site(s). The patient was placed in the supine position to maintain pressure on the biopsy site. Post-procedure wound care instructions were given.     Complications: NO    Pre-procedural pain: 0 out of 10 on the numeric pain rating scale.     Procedural pain: unable to assess due to sedation     Post-procedural pain assessment: unable to assess due to sedation     Interventions: NO    Length of procedure:20 minutes or less      Procedure performed by: Alexa Albert        Again, thank you for allowing me to participate in the care of your patient.        Sincerely,         BONE MARROW  BIOPSY

## 2021-10-15 ENCOUNTER — HOSPITAL ENCOUNTER (OUTPATIENT)
Dept: PET IMAGING | Facility: HOSPITAL | Age: 51
End: 2021-10-15
Attending: INTERNAL MEDICINE
Payer: COMMERCIAL

## 2021-10-15 DIAGNOSIS — C90.00 MULTIPLE MYELOMA, REMISSION STATUS UNSPECIFIED (H): ICD-10-CM

## 2021-10-15 DIAGNOSIS — Z86.2 PERSONAL HISTORY OF DISEASES OF BLOOD AND BLOOD-FORMING ORGANS: ICD-10-CM

## 2021-10-15 LAB
GLUCOSE BLDC GLUCOMTR-MCNC: 104 MG/DL (ref 70–99)
HGB S BLD QL: NEGATIVE
HOLD SPECIMEN: NORMAL
IGD SER-MCNC: <1.3 MG/DL
PATH REPORT.COMMENTS IMP SPEC: NORMAL
PATH REPORT.FINAL DX SPEC: NORMAL
PATH REPORT.MICROSCOPIC SPEC OTHER STN: NORMAL
PATH REPORT.RELEVANT HX SPEC: NORMAL

## 2021-10-15 PROCEDURE — 82962 GLUCOSE BLOOD TEST: CPT

## 2021-10-15 PROCEDURE — 343N000001 HC RX 343: Performed by: INTERNAL MEDICINE

## 2021-10-15 PROCEDURE — A9552 F18 FDG: HCPCS | Performed by: INTERNAL MEDICINE

## 2021-10-15 PROCEDURE — 78816 PET IMAGE W/CT FULL BODY: CPT | Mod: PS

## 2021-10-15 RX ADMIN — FLUDEOXYGLUCOSE F-18 11.26 MCI.: 500 INJECTION, SOLUTION INTRAVENOUS at 11:48

## 2021-10-16 ENCOUNTER — LAB (OUTPATIENT)
Dept: LAB | Facility: CLINIC | Age: 51
End: 2021-10-16
Payer: COMMERCIAL

## 2021-10-16 DIAGNOSIS — Z86.2 PERSONAL HISTORY OF DISEASES OF BLOOD AND BLOOD-FORMING ORGANS: ICD-10-CM

## 2021-10-16 DIAGNOSIS — C90.00 MULTIPLE MYELOMA, REMISSION STATUS UNSPECIFIED (H): ICD-10-CM

## 2021-10-16 PROCEDURE — 86335 IMMUNFIX E-PHORSIS/URINE/CSF: CPT | Mod: 26 | Performed by: PATHOLOGY

## 2021-10-16 PROCEDURE — 81050 URINALYSIS VOLUME MEASURE: CPT | Performed by: PATHOLOGY

## 2021-10-16 PROCEDURE — 86335 IMMUNFIX E-PHORSIS/URINE/CSF: CPT | Mod: TC | Performed by: INTERNAL MEDICINE

## 2021-10-16 PROCEDURE — 84166 PROTEIN E-PHORESIS/URINE/CSF: CPT | Mod: 26 | Performed by: PATHOLOGY

## 2021-10-17 LAB
COLLECT DURATION TIME UR: 24 H
COLLECT DURATION TIME UR: 24 H
CREAT 24H UR-MRATE: 1.34 G/SPEC (ref 1–2)
CREAT 24H UR-MRATE: 1.39 G/SPEC (ref 1–2)
CREAT CL 24H UR+SERPL-VRATE: 95 ML/MIN
CREAT CL/1.73 SQ M 24H UR+SERPL-ARVRAT: 90 ML/MIN/1.7M2
CREAT UR-MCNC: 75 MG/DL
CREAT UR-MCNC: 77 MG/DL
CREATININE (SYNCED VALUE): 1.01 MG/DL
DONOR CYTOMEGALOVIRUS ABY: POSITIVE
DONOR HEP B CORE ABY: ABNORMAL
DONOR HEP B SURF AGN: ABNORMAL
DONOR HEPATITIS C ABY: ABNORMAL
DONOR HTLV 1&2 ANTIBODY: ABNORMAL
DONOR TREPONEMA PAL ABY: ABNORMAL
HBV DNA SERPL QL NAA+PROBE: NORMAL
HCV RNA SERPL QL NAA+PROBE: NORMAL
HIV1+2 AB SERPL QL IA: ABNORMAL
HIV1+2 RNA SERPL QL NAA+PROBE: NORMAL
PROT 24H UR-MRATE: 0.09 G/SPEC (ref 0.04–0.23)
PROT UR-MCNC: 0.05 G/L
PROT/CREAT 24H UR: 0.06 G/G CR (ref 0–0.2)
SPECIMEN VOL UR: 1740 ML
SPECIMEN VOL UR: 1850 ML
TRYPANOSOMA CRUZI: ABNORMAL
WNV RNA SERPL DONR QL NAA+PROBE: NORMAL

## 2021-10-18 ENCOUNTER — OFFICE VISIT (OUTPATIENT)
Dept: CARDIOLOGY | Facility: CLINIC | Age: 51
End: 2021-10-18
Attending: INTERNAL MEDICINE
Payer: COMMERCIAL

## 2021-10-18 ENCOUNTER — PRE VISIT (OUTPATIENT)
Dept: CARDIOLOGY | Facility: CLINIC | Age: 51
End: 2021-10-18

## 2021-10-18 ENCOUNTER — HOSPITAL ENCOUNTER (OUTPATIENT)
Dept: LAB | Facility: CLINIC | Age: 51
End: 2021-10-18
Attending: PEDIATRICS
Payer: COMMERCIAL

## 2021-10-18 ENCOUNTER — OFFICE VISIT (OUTPATIENT)
Dept: EDUCATION SERVICES | Facility: CLINIC | Age: 51
End: 2021-10-18
Attending: PEDIATRICS
Payer: COMMERCIAL

## 2021-10-18 ENCOUNTER — ANCILLARY PROCEDURE (OUTPATIENT)
Dept: GENERAL RADIOLOGY | Facility: CLINIC | Age: 51
End: 2021-10-18
Attending: INTERNAL MEDICINE
Payer: COMMERCIAL

## 2021-10-18 VITALS
RESPIRATION RATE: 18 BRPM | TEMPERATURE: 98.8 F | DIASTOLIC BLOOD PRESSURE: 85 MMHG | HEIGHT: 66 IN | HEART RATE: 86 BPM | SYSTOLIC BLOOD PRESSURE: 141 MMHG | BODY MASS INDEX: 25.51 KG/M2 | WEIGHT: 158.73 LBS

## 2021-10-18 VITALS
HEART RATE: 76 BPM | BODY MASS INDEX: 26.16 KG/M2 | DIASTOLIC BLOOD PRESSURE: 89 MMHG | OXYGEN SATURATION: 100 % | SYSTOLIC BLOOD PRESSURE: 146 MMHG | WEIGHT: 162 LBS

## 2021-10-18 DIAGNOSIS — Z95.5 PRESENCE OF DRUG-ELUTING STENT IN LEFT CIRCUMFLEX CORONARY ARTERY: ICD-10-CM

## 2021-10-18 DIAGNOSIS — Z86.2 PERSONAL HISTORY OF DISEASES OF BLOOD AND BLOOD-FORMING ORGANS: ICD-10-CM

## 2021-10-18 DIAGNOSIS — Z76.82 BONE MARROW TRANSPLANT CANDIDATE: ICD-10-CM

## 2021-10-18 DIAGNOSIS — C90.00 MULTIPLE MYELOMA, REMISSION STATUS UNSPECIFIED (H): ICD-10-CM

## 2021-10-18 DIAGNOSIS — C90.00 MULTIPLE MYELOMA NOT HAVING ACHIEVED REMISSION (H): Primary | ICD-10-CM

## 2021-10-18 DIAGNOSIS — I10 BENIGN ESSENTIAL HYPERTENSION: ICD-10-CM

## 2021-10-18 DIAGNOSIS — Z01.810 ENCOUNTER FOR PRE-OPERATIVE CARDIOVASCULAR CLEARANCE: Primary | ICD-10-CM

## 2021-10-18 DIAGNOSIS — I25.2 HISTORY OF ACUTE INFERIOR WALL MI: ICD-10-CM

## 2021-10-18 DIAGNOSIS — C90.00 MULTIPLE MYELOMA NOT HAVING ACHIEVED REMISSION (H): ICD-10-CM

## 2021-10-18 LAB
LVEF ECHO: NORMAL
PATH REPORT.COMMENTS IMP SPEC: NORMAL
PATH REPORT.COMMENTS IMP SPEC: NORMAL
PATH REPORT.FINAL DX SPEC: NORMAL
PATH REPORT.GROSS SPEC: NORMAL
PATH REPORT.MICROSCOPIC SPEC OTHER STN: NORMAL
PATH REPORT.MICROSCOPIC SPEC OTHER STN: NORMAL
PATH REPORT.RELEVANT HX SPEC: NORMAL

## 2021-10-18 PROCEDURE — G0463 HOSPITAL OUTPT CLINIC VISIT: HCPCS

## 2021-10-18 PROCEDURE — 99215 OFFICE O/P EST HI 40 MIN: CPT | Mod: 25 | Performed by: INTERNAL MEDICINE

## 2021-10-18 PROCEDURE — 93306 TTE W/DOPPLER COMPLETE: CPT | Performed by: INTERNAL MEDICINE

## 2021-10-18 PROCEDURE — 71046 X-RAY EXAM CHEST 2 VIEWS: CPT | Performed by: RADIOLOGY

## 2021-10-18 ASSESSMENT — ENCOUNTER SYMPTOMS
POLYPHAGIA: 0
WEIGHT GAIN: 0
JAUNDICE: 0
COUGH DISTURBING SLEEP: 1
MYALGIAS: 1
CONSTIPATION: 1
COUGH: 1
HEARTBURN: 0
POLYDIPSIA: 1
NAIL CHANGES: 0
RECTAL PAIN: 0
STIFFNESS: 1
JOINT SWELLING: 0
NAUSEA: 1
MUSCLE WEAKNESS: 1
SPUTUM PRODUCTION: 1
VOMITING: 0
ALTERED TEMPERATURE REGULATION: 1
SKIN CHANGES: 1
BOWEL INCONTINENCE: 0
DECREASED APPETITE: 0
DYSPNEA ON EXERTION: 0
ABDOMINAL PAIN: 0
WEIGHT LOSS: 0
HALLUCINATIONS: 0
MUSCLE CRAMPS: 1
BLOATING: 1
BACK PAIN: 1
ARTHRALGIAS: 1
SNORES LOUDLY: 0
HEMOPTYSIS: 0
FEVER: 1
WHEEZING: 1
NIGHT SWEATS: 1
POSTURAL DYSPNEA: 0
CHILLS: 1
NECK PAIN: 1
INCREASED ENERGY: 1
DIARRHEA: 1
POOR WOUND HEALING: 0
BLOOD IN STOOL: 0
SHORTNESS OF BREATH: 0
FATIGUE: 1

## 2021-10-18 ASSESSMENT — MIFFLIN-ST. JEOR: SCORE: 1507.5

## 2021-10-18 ASSESSMENT — PAIN SCALES - GENERAL: PAINLEVEL: NO PAIN (0)

## 2021-10-18 NOTE — LETTER
10/18/2021      RE: June Ronquillo  [address]  [facility] [address]       Dear Colleague,    Thank you for the opportunity to participate in the care of your patient, June Ronquillo, at the [facility] [place] [facility]. Please see a copy of my visit note below.    Referring provider:Kyle Wang MD    HPI: Mr. June Ronquillo is a 51 year old  male with PMH significant for    -Acute inferoposterior STEMI in 2004 status post circumflex stent  -Hypertension  -Hyperlipidemia  -Multiple myeloma 4/2021    In April of this year patient presented with hematuria and work-up revealed monoclonal protein in the urine.  Subsequently he was diagnosed with multiple myeloma.  He has received chemotherapy and is now being considered for autologous bone marrow transplantation.  Patient is being seen today for cardiovascular evaluation prior to BMT.  He is physically very active.  Denies exertional symptoms.  The patient denies a history of chest discomfort, dyspnea, PND, orthopnea, pedal edema, palpitations, lightheadedness, or syncope.  He is physically active and he tells me that he can climb 2 flights of stairs with no difficulty.  Compliant with medications.  He tells me that his blood pressure is usually lower at home.  He follows with Dr. Dominguez Craig from cardiology on annual basis.   Echocardiogram today normal biventricular function with no valve disease.  The patient's risk factor profile is: (+) HTN, (-) diabetes, (+) hyperlipidemia, (-) lifetime no tobacco use, (+) family Hx CAD.     I had reviewed patient's EKG 10/13/2021 which shows sinus rhythm otherwise normal.    Medications, personal, family, and social history reviewed with patient and revised.    PAST MEDICAL HISTORY:  Past Medical History:   Diagnosis Date     CAD (coronary artery disease)     s/p stent to CFX     Heart attack (H)      Hyperlipidemia LDL goal <100       Hypertension      Stented coronary artery      Thyroid disease        CURRENT MEDICATIONS:  Current Outpatient Medications   Medication Sig Dispense Refill     acyclovir (ZOVIRAX) 400 MG tablet Take 1 tablet (400 mg) by mouth 2 times daily 60 tablet 6     aspirin (ASA) 325 MG tablet Take 1 tablet (325 mg) by mouth daily (Patient not taking: Reported on 10/13/2021) 30 tablet 4     calcium carb-cholecalciferol (OS-HARDEEP) 500-200 MG-UNIT tablet Take 1 tablet by mouth daily 30 tablet 11     dexamethasone (DECADRON) 4 MG tablet Take 20 mg (four tabs) by mouth on Day 2 15 tablet 0     FISH OIL 1000 MG OR CAPS 1 bid (Patient not taking: No sig reported)       hydrochlorothiazide (HYDRODIURIL) 50 MG tablet Take 1 tablet (50 mg) by mouth daily 30 tablet 3     LENalidomide (REVLIMID) 25 MG CAPS capsule Take 1 capsule (25 mg) by mouth daily for 14 days 14 capsule 0     levofloxacin (LEVAQUIN) 250 MG tablet Take 1 tablet (250 mg) by mouth daily 30 tablet 4     levothyroxine (SYNTHROID/LEVOTHROID) 50 MCG tablet Take 1 tablet (50 mcg) by mouth daily 90 tablet 1     losartan (COZAAR) 50 MG tablet Take 1 tablet (50 mg) by mouth daily 90 tablet 2     order for DME Rib belt 1 Device 0     pantoprazole (PROTONIX) 20 MG EC tablet Take 1 tablet (20 mg) by mouth daily 30 tablet 4     potassium chloride ER (KLOR-CON M) 10 MEQ CR tablet Take 2 tablets (20 mEq) by mouth daily 30 tablet 3     prochlorperazine (COMPAZINE) 10 MG tablet Take 1 tablet (10 mg) by mouth every 6 hours as needed (Nausea/Vomiting) 30 tablet 5     rosuvastatin (CRESTOR) 40 MG tablet TAKE ONE TABLET BY MOUTH ONCE DAILY. MUST SCHEDULE APPOINTMENT FOR ANNUAL FOLLOW UP FOR FURTHER REFILLS 90 tablet 1       PAST SURGICAL HISTORY:  Past Surgical History:   Procedure Laterality Date     BONE MARROW BIOPSY, BONE SPECIMEN, NEEDLE/TROCAR Left 5/17/2021    Procedure: BIOPSY, BONE MARROW with aspiration and ancillary studies;  Surgeon: Niharika Regalado MD;  Location:  OR      BONE MARROW BIOPSY, BONE SPECIMEN, NEEDLE/TROCAR Left 10/14/2021    Procedure: BIOPSY, BONE MARROW;  Surgeon: Alexa Albert APRN CNP;  Location: [facility] OR     HEART CATH PTCA SINGLE VESSEL  10/10/2004    Stent to CFX - [facility]        ALLERGIES:     Allergies   Allergen Reactions     Blood Transfusion Related (Informational Only) Other (See Comments)     Patient has a history of a clinically significant antibody against RBC antigens.  A delay in compatible RBCs may occur.      Nkda [No Known Drug Allergies]        FAMILY HISTORY:  Family History   Problem Relation Age of Onset     Hypertension Mother      Hyperlipidemia Mother      Heart Disease Paternal Grandmother      Hyperlipidemia Sister      Hyperlipidemia Sister          SOCIAL HISTORY:  Social History     Tobacco Use     Smoking status: Never Smoker     Smokeless tobacco: Never Used   Substance Use Topics     Alcohol use: Not Currently     Drug use: No       ROS:   Constitutional: No fever, chills, or sweats. Weight stable. .   Cardiovascular: As per HPI.     Exam:  Blood pressure 147/86 mmHg, heart rate 91 bpm  GENERAL APPEARANCE: alert and no distress  HEENT: no icterus, no central cyanosis  LYMPH/NECK: no adenopathy, no asymmetry, JVP not elevated, no carotid bruits.  RESPIRATORY: lungs clear to auscultation - no rales, rhonchi or wheezes, no use of accessory muscles, no retractions, respirations are unlabored, normal respiratory rate  CARDIOVASCULAR: regular rhythm, normal S1, S2, no S3 or S4 and no murmur, click or rub, precordium quiet with normal PMI.  GI: soft, non tender  EXTREMITIES: no edema  NEURO: alert, normal speech,and affect  SKIN: no ecchymoses, no rashes     I have reviewed the labs and personally reviewed the imaging below and made my comment in the assessment and plan.    Labs:  CBC RESULTS:   Lab Results   Component Value Date    WBC 6.5 10/14/2021    WBC 9.6 07/09/2021    RBC 4.11 (L) 10/14/2021    RBC 3.62 (L)  07/09/2021    HGB 13.5 10/14/2021    HGB 10.9 (L) 07/09/2021    HCT 38.1 (L) 10/14/2021    HCT 32.1 (L) 07/09/2021    MCV 93 10/14/2021    MCV 89 07/09/2021    MCH 32.8 10/14/2021    MCH 30.1 07/09/2021    MCHC 35.4 10/14/2021    MCHC 34.0 07/09/2021    RDW 14.9 10/14/2021    RDW 17.0 (H) 07/09/2021     (H) 10/14/2021     (H) 07/09/2021       BMP RESULTS:  Lab Results   Component Value Date     10/14/2021     07/09/2021    POTASSIUM 3.6 10/14/2021    POTASSIUM 3.7 07/09/2021    CHLORIDE 109 10/14/2021    CHLORIDE 107 07/09/2021    CO2 25 10/14/2021    CO2 24 07/09/2021    ANIONGAP 6 10/14/2021    ANIONGAP 5 07/09/2021     (H) 10/15/2021     (H) 10/14/2021    GLC 91 07/09/2021    BUN 16 10/14/2021    BUN 8 07/09/2021    CR 1.01 10/14/2021    CR 0.87 07/09/2021    GFRESTIMATED 85 10/14/2021    GFRESTIMATED >90 07/09/2021    GFRESTBLACK >90 07/09/2021    HARDEEP 9.0 10/14/2021    HARDEEP 8.4 (L) 07/09/2021        INR RESULTS:  Lab Results   Component Value Date    INR 0.95 10/14/2021    INR 1.03 10/13/2021       Echocardiogram 10/18/2021  Global and regional left ventricular function is normal with an EF of 60-65%.  Global peak LV longitudinal strain is averaged at -18.6%. This is within  reported normal limits (normal <-18%).  Right ventricular function, chamber size, wall motion, and thickness are  normal.  No significant valvular abnormalities were noted.  Previous study not available for comparison.      EKG 10/14/2021 Normal          Assessment and Plan:   #Acute inferoposterior STEMI in 2004 status post circumflex stent  #Multiple myeloma status post chemotherapy no other work-up for BMT  # Cardiovascular evaluation prior to BMT  -Patient asymptomatic.  Normal functional capacity.  Normal echocardiogram.  -Patient can proceed to BMT.    # Hypertension  -Overall well controlled with losartan 50 mg and hydrochlorothiazide 50 mg  -Today's blood pressure in clinic is high likely due to  stress from multiple appointments throughout the day.  He confirms that his blood pressure is lower at home.    No medication changes today.    Return to clinic as needed.    Total time spent today for this visit is 60 minutes including precharting, face-to-face clinic visit, review of labs/imaging and medical documentation.    Please donot hesitate to contact me if you have any questions or concerns. Again, thank you for allowing me to participate in the care of your patient.    Romulo GONZALEZ MD  [facility] Division of Cardiology  Pager [phone]

## 2021-10-18 NOTE — PROGRESS NOTES
Referring provider:Kyle Wang MD    HPI: Mr. June Ronquillo is a 51 year old  male with PMH significant for    -Acute inferoposterior STEMI in 2004 status post circumflex stent  -Hypertension  -Hyperlipidemia  -Multiple myeloma 4/2021    In April of this year patient presented with hematuria and work-up revealed monoclonal protein in the urine.  Subsequently he was diagnosed with multiple myeloma.  He has received chemotherapy and is now being considered for autologous bone marrow transplantation.  Patient is being seen today for cardiovascular evaluation prior to BMT.  He is physically very active.  Denies exertional symptoms.  The patient denies a history of chest discomfort, dyspnea, PND, orthopnea, pedal edema, palpitations, lightheadedness, or syncope.  He is physically active and he tells me that he can climb 2 flights of stairs with no difficulty.  Compliant with medications.  He tells me that his blood pressure is usually lower at home.  He follows with Dr. Dominguez Craig from cardiology on annual basis.   Echocardiogram today normal biventricular function with no valve disease.  The patient's risk factor profile is: (+) HTN, (-) diabetes, (+) hyperlipidemia, (-) lifetime no tobacco use, (+) family Hx CAD.     I had reviewed patient's EKG 10/13/2021 which shows sinus rhythm otherwise normal.    Medications, personal, family, and social history reviewed with patient and revised.    PAST MEDICAL HISTORY:  Past Medical History:   Diagnosis Date     CAD (coronary artery disease)     s/p stent to CFX     Heart attack (H)      Hyperlipidemia LDL goal <100      Hypertension      Stented coronary artery      Thyroid disease        CURRENT MEDICATIONS:  Current Outpatient Medications   Medication Sig Dispense Refill     acyclovir (ZOVIRAX) 400 MG tablet Take 1 tablet (400 mg) by mouth 2 times daily 60 tablet 6     aspirin (ASA) 325 MG tablet Take 1 tablet (325 mg) by mouth daily (Patient not taking: Reported on  10/13/2021) 30 tablet 4     calcium carb-cholecalciferol (OS-HARDEEP) 500-200 MG-UNIT tablet Take 1 tablet by mouth daily 30 tablet 11     dexamethasone (DECADRON) 4 MG tablet Take 20 mg (four tabs) by mouth on Day 2 15 tablet 0     FISH OIL 1000 MG OR CAPS 1 bid (Patient not taking: No sig reported)       hydrochlorothiazide (HYDRODIURIL) 50 MG tablet Take 1 tablet (50 mg) by mouth daily 30 tablet 3     LENalidomide (REVLIMID) 25 MG CAPS capsule Take 1 capsule (25 mg) by mouth daily for 14 days 14 capsule 0     levofloxacin (LEVAQUIN) 250 MG tablet Take 1 tablet (250 mg) by mouth daily 30 tablet 4     levothyroxine (SYNTHROID/LEVOTHROID) 50 MCG tablet Take 1 tablet (50 mcg) by mouth daily 90 tablet 1     losartan (COZAAR) 50 MG tablet Take 1 tablet (50 mg) by mouth daily 90 tablet 2     order for DME Rib belt 1 Device 0     pantoprazole (PROTONIX) 20 MG EC tablet Take 1 tablet (20 mg) by mouth daily 30 tablet 4     potassium chloride ER (KLOR-CON M) 10 MEQ CR tablet Take 2 tablets (20 mEq) by mouth daily 30 tablet 3     prochlorperazine (COMPAZINE) 10 MG tablet Take 1 tablet (10 mg) by mouth every 6 hours as needed (Nausea/Vomiting) 30 tablet 5     rosuvastatin (CRESTOR) 40 MG tablet TAKE ONE TABLET BY MOUTH ONCE DAILY. MUST SCHEDULE APPOINTMENT FOR ANNUAL FOLLOW UP FOR FURTHER REFILLS 90 tablet 1       PAST SURGICAL HISTORY:  Past Surgical History:   Procedure Laterality Date     BONE MARROW BIOPSY, BONE SPECIMEN, NEEDLE/TROCAR Left 5/17/2021    Procedure: BIOPSY, BONE MARROW with aspiration and ancillary studies;  Surgeon: Niharika Regalado MD;  Location: [facility] OR     BONE MARROW BIOPSY, BONE SPECIMEN, NEEDLE/TROCAR Left 10/14/2021    Procedure: BIOPSY, BONE MARROW;  Surgeon: Alexa Albert APRN CNP;  Location: [facility] OR     HEART CATH PTCA SINGLE VESSEL  10/10/2004    Stent to CFX - [facility]        ALLERGIES:     Allergies   Allergen Reactions     Blood Transfusion Related (Informational Only) Other  (See Comments)     Patient has a history of a clinically significant antibody against RBC antigens.  A delay in compatible RBCs may occur.      Nkda [No Known Drug Allergies]        FAMILY HISTORY:  Family History   Problem Relation Age of Onset     Hypertension Mother      Hyperlipidemia Mother      Heart Disease Paternal Grandmother      Hyperlipidemia Sister      Hyperlipidemia Sister          SOCIAL HISTORY:  Social History     Tobacco Use     Smoking status: Never Smoker     Smokeless tobacco: Never Used   Substance Use Topics     Alcohol use: Not Currently     Drug use: No       ROS:   Constitutional: No fever, chills, or sweats. Weight stable. .   Cardiovascular: As per HPI.     Exam:  Blood pressure 147/86 mmHg, heart rate 91 bpm  GENERAL APPEARANCE: alert and no distress  HEENT: no icterus, no central cyanosis  LYMPH/NECK: no adenopathy, no asymmetry, JVP not elevated, no carotid bruits.  RESPIRATORY: lungs clear to auscultation - no rales, rhonchi or wheezes, no use of accessory muscles, no retractions, respirations are unlabored, normal respiratory rate  CARDIOVASCULAR: regular rhythm, normal S1, S2, no S3 or S4 and no murmur, click or rub, precordium quiet with normal PMI.  GI: soft, non tender  EXTREMITIES: no edema  NEURO: alert, normal speech,and affect  SKIN: no ecchymoses, no rashes     I have reviewed the labs and personally reviewed the imaging below and made my comment in the assessment and plan.    Labs:  CBC RESULTS:   Lab Results   Component Value Date    WBC 6.5 10/14/2021    WBC 9.6 07/09/2021    RBC 4.11 (L) 10/14/2021    RBC 3.62 (L) 07/09/2021    HGB 13.5 10/14/2021    HGB 10.9 (L) 07/09/2021    HCT 38.1 (L) 10/14/2021    HCT 32.1 (L) 07/09/2021    MCV 93 10/14/2021    MCV 89 07/09/2021    MCH 32.8 10/14/2021    MCH 30.1 07/09/2021    MCHC 35.4 10/14/2021    MCHC 34.0 07/09/2021    RDW 14.9 10/14/2021    RDW 17.0 (H) 07/09/2021     (H) 10/14/2021     (H) 07/09/2021       BMP  RESULTS:  Lab Results   Component Value Date     10/14/2021     07/09/2021    POTASSIUM 3.6 10/14/2021    POTASSIUM 3.7 07/09/2021    CHLORIDE 109 10/14/2021    CHLORIDE 107 07/09/2021    CO2 25 10/14/2021    CO2 24 07/09/2021    ANIONGAP 6 10/14/2021    ANIONGAP 5 07/09/2021     (H) 10/15/2021     (H) 10/14/2021    GLC 91 07/09/2021    BUN 16 10/14/2021    BUN 8 07/09/2021    CR 1.01 10/14/2021    CR 0.87 07/09/2021    GFRESTIMATED 85 10/14/2021    GFRESTIMATED >90 07/09/2021    GFRESTBLACK >90 07/09/2021    HARDEEP 9.0 10/14/2021    HARDEEP 8.4 (L) 07/09/2021        INR RESULTS:  Lab Results   Component Value Date    INR 0.95 10/14/2021    INR 1.03 10/13/2021       Echocardiogram 10/18/2021  Global and regional left ventricular function is normal with an EF of 60-65%.  Global peak LV longitudinal strain is averaged at -18.6%. This is within  reported normal limits (normal <-18%).  Right ventricular function, chamber size, wall motion, and thickness are  normal.  No significant valvular abnormalities were noted.  Previous study not available for comparison.      EKG 10/14/2021 Normal          Assessment and Plan:   #Acute inferoposterior STEMI in 2004 status post circumflex stent  #Multiple myeloma status post chemotherapy no other work-up for BMT  # Cardiovascular evaluation prior to BMT  -Patient asymptomatic.  Normal functional capacity.  Normal echocardiogram.  -Patient can proceed to BMT.    # Hypertension  -Overall well controlled with losartan 50 mg and hydrochlorothiazide 50 mg  -Today's blood pressure in clinic is high likely due to stress from multiple appointments throughout the day.  He confirms that his blood pressure is lower at home.    No medication changes today.    Return to clinic as needed.    Total time spent today for this visit is 60 minutes including precharting, face-to-face clinic visit, review of labs/imaging and medical documentation.    Please donot hesitate to contact  me if you have any questions or concerns. Again, thank you for allowing me to participate in the care of your patient.    Romulo GONZALEZ MD  [facility] Division of Cardiology  Pager [phone]

## 2021-10-18 NOTE — NURSING NOTE
Chief Complaint   Patient presents with     New Patient     cardiac clearance     Vitals were taken and medications reconciled.    Lalo Higgins, EMT  2:32 PM

## 2021-10-18 NOTE — TELEPHONE ENCOUNTER
Marking hi-ketan since pt says he requested it online over a week ago and we never received the request to ask for refills.

## 2021-10-18 NOTE — TELEPHONE ENCOUNTER
levofloxacin (LEVAQUIN) 250 MG tablet  Last Written Prescription Date:  5/19/2021  Last Fill Quantity: 30,   # refills: 4  Last Office Visit : 8/31/2021  Future Office visit:  None    Routing refill request to provider for review/approval because:  Drug not on the [facility], [facility] or [facility] refill protocol or controlled substance      Sarahi Bobby RN  Central Triage Red Flags/Med Refills

## 2021-10-18 NOTE — PROGRESS NOTES
10/18/21 1211 Lorelei Guevara, RN     Patient and spouse seen in [facility] for Central Line care education. Spouse RD correctly on a model with flushing, cap change and bandage change. Both asked relevant questions. Answered all teach back questions appropriately. State they understand all information presented. Was encouraged to practice skills with BMT staff when appropriate.   Literature given: Handwashing and Skin Care, Your Central Venous Catheter, Caring for Your Central Venous Catheter at Home, Changing the End Cap, Flushing the Line with Heparin, Saline or Citrate, Changing Your Bandage and Showering and Skin Care.

## 2021-10-18 NOTE — CONSULTS
"APHERESIS INITIAL CONSULT CHECKLIST    Current Encounter Information  Current Encounter Information: Reason for Visit, Allergies and Current Meds  Procedure Requested: MNC/PBSC Collection  History of: (Reason for Apheresis): MULTIPLE MYELOMA    Access Assessment  Access Assessment  Vein Assessment:  Veins are adequate: No  Needs a catheter placed for Apheresis?: Yes, transfusion medicine physician informed.    Vital Signs  Vital Signs  BP: (!) 141/85  Pulse: 86  Temp: 98.8  F (37.1  C)  Temp src: Oral  Resp: 18  Height: 167.6 cm (5' 5.98\")  Weight: 72 kg (158 lb 11.7 oz)    Reviewed   Review With Patient  Have you read the brochure Getting ready for Apheresis?: Yes  Have you had any invasive procedures, surgery, biopsy, bleeding in the last month?: No  Review medications and allergies: Yes  Have you ever been transfused?: No  Patient given tour of the unit: No (DONE IN CONSULT RM)    Additional Information  Notes, needs and time spent with patient  Explain procedure, side effects or reactions, instructions: Yes  Patient has special need?: No  Time spent: 45 MINS FACE TO FACE TIME WITH PT TO REVIEW AUTO PBPC MEDICAL HISTORY, DO VITALS AND TALKED  ABOUT PROCEDURE. LOW FAT DIET, HYDRATION WERE REITERATED FOR DAY PRIOR & DAYS OF COLLECTION. PT HAD SOME QUESTIONS & WERE CLARIFIED. MET WITH DR. SHEPPARD & BB FELLOW FOR CONSENT.      "

## 2021-10-18 NOTE — CONSULTS
Laboratory Medicine and Pathology  Transfusion Medicine Consultation     June Ronquillo MRN# [id number]   YOB: 1970 Age: 51 year old   Date of Consult: 10/18/2021  Reason for consult: Autologous PBSC collection for MM       Assessment and Plan:   June Ronquillo is a 51 year old male with history of early coronary artery disease and with recent diagnosis of high risk multiple myeloma who was  referred for  Evaluation and consent for of autologous PBSC collection. Risks and Benefits explained. He had all of his questions answered and consented to the procedure.  Attestation:   This patient has been seen and evaluated by me, Taot Hardin.        History of Present Illness   June Ronquillo is a 51 year old male with an h/o CAD (had an MI  @ 28), HTN, HLD, who presented in April with hematuria. Work up revealed a monoclonal protein in the urine (34.8) Large monoclonal free immunoglobulin light chain of lambda chain type and blood 0.2 Monoclonal free immunoglobulin light chain of lambda chain type. A CT scan A/P showed numerous lytic lesions in the spine, ribs, and pelvis. A  BM bx  on 5/17/2021 flow showing 4.0% plasma cells which express CD19 (dim), CD38 (bright), CD45 (dim), , and monotypic cytoplasmic lambda immunoglobulin light chains but lack CD20 and CD56. ---Hypercellular bone marrow for age (approximately 75% cellularity) with adequate trilineage hematopoiesis. Plasma cell infiltrate: Interstitial, lambda monotypic and representing approximately 60% the bone marrow cellularity, by  immunohistochemical stain.  Treated  with Miracle & Velcade     Patient here to be consented for a  PBSC collection as it was felt an autologous stem cell transplantation would allow the delivery of high myeloablative doses of melphalan followed by stem cell rescue to induce a deeper and more prolonged duration of remission.       Past Medical History:      Past Medical History:   Diagnosis Date     CAD (coronary artery disease)     s/p stent to CFX     Heart attack (H)      Hyperlipidemia LDL goal <100      Hypertension      Stented coronary artery      Thyroid disease              Past Surgical History:     Past Surgical History:   Procedure Laterality Date     BONE MARROW BIOPSY, BONE SPECIMEN, NEEDLE/TROCAR Left 5/17/2021    Procedure: BIOPSY, BONE MARROW with aspiration and ancillary studies;  Surgeon: Niharika Regalado MD;  Location: [facility] OR     BONE MARROW BIOPSY, BONE SPECIMEN, NEEDLE/TROCAR Left 10/14/2021    Procedure: BIOPSY, BONE MARROW;  Surgeon: Alexa Albert APRN CNP;  Location: [facility] OR     HEART CATH PTCA SINGLE VESSEL  10/10/2004    Stent to CFX - [facility]               Social History:     Social History     Tobacco Use     Smoking status: Never Smoker     Smokeless tobacco: Never Used   Substance Use Topics     Alcohol use: Not Currently            Allergies:     Allergies   Allergen Reactions     Blood Transfusion Related (Informational Only) Other (See Comments)     Patient has a history of a clinically significant antibody against RBC antigens.  A delay in compatible RBCs may occur.      Nkda [No Known Drug Allergies]              Medications:     Current Outpatient Medications   Medication Sig     acyclovir (ZOVIRAX) 400 MG tablet Take 1 tablet (400 mg) by mouth 2 times daily     aspirin (ASA) 325 MG tablet Take 1 tablet (325 mg) by mouth daily (Patient not taking: Reported on 10/13/2021)     calcium carb-cholecalciferol (OS-HARDEEP) 500-200 MG-UNIT tablet Take 1 tablet by mouth daily     dexamethasone (DECADRON) 4 MG tablet Take 20 mg (four tabs) by mouth on Day 2     FISH OIL 1000 MG OR CAPS 1 bid (Patient not taking: No sig reported)     hydrochlorothiazide (HYDRODIURIL) 50 MG tablet Take 1 tablet (50 mg) by mouth daily     LENalidomide (REVLIMID) 25 MG CAPS capsule Take 1 capsule (25 mg) by mouth daily for 14 days      "levofloxacin (LEVAQUIN) 250 MG tablet Take 1 tablet (250 mg) by mouth daily     levothyroxine (SYNTHROID/LEVOTHROID) 50 MCG tablet Take 1 tablet (50 mcg) by mouth daily     losartan (COZAAR) 50 MG tablet Take 1 tablet (50 mg) by mouth daily     order for DME Rib belt     pantoprazole (PROTONIX) 20 MG EC tablet Take 1 tablet (20 mg) by mouth daily     potassium chloride ER (KLOR-CON M) 10 MEQ CR tablet Take 2 tablets (20 mEq) by mouth daily     prochlorperazine (COMPAZINE) 10 MG tablet Take 1 tablet (10 mg) by mouth every 6 hours as needed (Nausea/Vomiting)     rosuvastatin (CRESTOR) 40 MG tablet TAKE ONE TABLET BY MOUTH ONCE DAILY. MUST SCHEDULE APPOINTMENT FOR ANNUAL FOLLOW UP FOR FURTHER REFILLS     No current facility-administered medications for this encounter.                Abbreviated Physical Exam:    24 hour range of vitals:  Temp:  [98.8  F (37.1  C)] 98.8  F (37.1  C)  Pulse:  [76-86] 76  Resp:  [18] 18  BP: (141-146)/(85-89) 146/89  SpO2:  [100 %] 100 %  BP (!) 141/85   Pulse 86   Temp 98.8  F (37.1  C) (Oral)   Resp 18   Ht 1.676 m (5' 5.98\")   Wt 72 kg (158 lb 11.7 oz)   BMI 25.63 kg/m      Weight: 158 lbs 11.7 oz  Height: 5' 5.984\"  Patient Alert & Oriented and in No Acute Distress         Laboratory Data:   BMP  Recent Labs   Lab 10/15/21  1143 10/14/21  0943 10/13/21  1354   NA  --  140 137   POTASSIUM  --  3.6 3.5   CHLORIDE  --  109 109   HARDEEP  --  9.0 9.0   CO2  --  25 23   BUN  --  16 12   CR  --  1.01 0.99   * 110* 124*     CBC  Recent Labs   Lab 10/14/21  0943 10/13/21  1354 10/13/21  1354   WBC 6.5  --  7.6   RBC 4.11*  --  4.07*   HGB 13.5   < > 12.6*   HCT 38.1*  --  38.0*   MCV 93  --  93   MCH 32.8  --  31.0   MCHC 35.4  --  33.2   RDW 14.9  --  14.9   *  --  457*    < > = values in this interval not displayed.     INR  Recent Labs   Lab 10/14/21  0943 10/13/21  1354   INR 0.95 1.03       Attestation:   This patient has been seen and evaluated by me, Tato Hardin. "   Tato Hardin MD   Division of Transfusion Medicine   [place] [address]   Pager: [phone] or [phone]

## 2021-10-19 ENCOUNTER — VIRTUAL VISIT (OUTPATIENT)
Dept: TRANSPLANT | Facility: CLINIC | Age: 51
End: 2021-10-19
Attending: INTERNAL MEDICINE
Payer: COMMERCIAL

## 2021-10-19 DIAGNOSIS — Z71.9 VISIT FOR COUNSELING: Primary | ICD-10-CM

## 2021-10-19 DIAGNOSIS — C90.00 MULTIPLE MYELOMA, REMISSION STATUS UNSPECIFIED (H): ICD-10-CM

## 2021-10-19 DIAGNOSIS — C90.00 MULTIPLE MYELOMA NOT HAVING ACHIEVED REMISSION (H): Primary | ICD-10-CM

## 2021-10-19 DIAGNOSIS — Z86.2 PERSONAL HISTORY OF DISEASES OF BLOOD AND BLOOD-FORMING ORGANS: ICD-10-CM

## 2021-10-19 LAB
ALBUMIN MFR UR ELPH: 100 %
M PROTEIN MFR UR ELPH: 0 %
PROT ELPH PNL UR ELPH: NORMAL
PROT PATTERN UR ELPH-IMP: ABNORMAL

## 2021-10-19 ASSESSMENT — ANXIETY QUESTIONNAIRES
3. WORRYING TOO MUCH ABOUT DIFFERENT THINGS: NOT AT ALL
2. NOT BEING ABLE TO STOP OR CONTROL WORRYING: NOT AT ALL
1. FEELING NERVOUS, ANXIOUS, OR ON EDGE: NOT AT ALL
5. BEING SO RESTLESS THAT IT IS HARD TO SIT STILL: NOT AT ALL
GAD7 TOTAL SCORE: 0
6. BECOMING EASILY ANNOYED OR IRRITABLE: NOT AT ALL
7. FEELING AFRAID AS IF SOMETHING AWFUL MIGHT HAPPEN: NOT AT ALL

## 2021-10-19 ASSESSMENT — PATIENT HEALTH QUESTIONNAIRE - PHQ9: 5. POOR APPETITE OR OVEREATING: NOT AT ALL

## 2021-10-19 NOTE — PROGRESS NOTES
"Pharmacy Assessment - Pre-Stem Cell Transplant    Assessments & Recommendations:  1) Hold hydrochlorothiazide until engraftment then resume  2) Suggest also holding rosuvastatin during acute phase of the transplant.   3) Ice cryotherapy 2-4 hours around the Melphalan   4) B-complex vitamin for neuropathy nerve repair   5) Steroids tend to make him somewhat crabby.     If this patient is admitted under observation, the patient may bring in their own supply of the following medication for use in the hospital:  1) Losartan,   -Per \"Medications Not Supplied by Pharmacy\" policy (available on [employer])    History of Present Illness:  June Ronquillo is a 51 year old year old male diagnosed with multiple myeloma.  he has been treated with Miracle+ RVd.  he is now being work up for autologous  Stem Cell Transplant on protocol [id number], which utilizes Melphalan as a conditioning regimen.    Pertinent labs/tests:  Viral Testing:  CMV(+) / HSV(+/-) / EBV(+) / VZV (?)  Ejection Fraction: 60-65% (10/13)  QTc: 454msec (10/13)    Weights:   Wt Readings from Last 3 Encounters:   10/18/21 72 kg (158 lb 11.7 oz)   10/18/21 73.5 kg (162 lb)   10/14/21 72 kg (158 lb 11.7 oz)   Ideal body weight: 62.3 kg (137 lb 6 oz)  Adjusted ideal body weight: 66.6 kg (146 lb 12.6 oz)  % IBW:  117  There is no height or weight on file to calculate BMI.    Primary BMT Physician:   BMT RN Coordinator:  Christine Reynolds    Past Medical History:  Past Medical History:   Diagnosis Date     CAD (coronary artery disease)     s/p stent to CFX     Heart attack (H)      Hyperlipidemia LDL goal <100      Hypertension      Stented coronary artery      Thyroid disease        Medication Allergies:  Allergies   Allergen Reactions     Blood Transfusion Related (Informational Only) Other (See Comments)     Patient has a history of a clinically significant antibody against RBC antigens.  A delay in compatible RBCs may occur.      Nkda [No Known Drug " Allergies]        Current Medications (pre-admit):  Current Outpatient Medications   Medication Sig Dispense Refill     acyclovir (ZOVIRAX) 400 MG tablet Take 1 tablet (400 mg) by mouth 2 times daily 60 tablet 6     aspirin (ASA) 325 MG tablet Take 1 tablet (325 mg) by mouth daily (Patient not taking: Reported on 10/13/2021) 30 tablet 4     calcium carb-cholecalciferol (OS-HARDEEP) 500-200 MG-UNIT tablet Take 1 tablet by mouth daily 30 tablet 11     dexamethasone (DECADRON) 4 MG tablet Take 20 mg (four tabs) by mouth on Day 2 15 tablet 0     FISH OIL 1000 MG OR CAPS 1 bid (Patient not taking: No sig reported)       hydrochlorothiazide (HYDRODIURIL) 50 MG tablet Take 1 tablet (50 mg) by mouth daily 30 tablet 3     LENalidomide (REVLIMID) 25 MG CAPS capsule Take 1 capsule (25 mg) by mouth daily for 14 days 14 capsule 0     levofloxacin (LEVAQUIN) 250 MG tablet Take 1 tablet (250 mg) by mouth daily 30 tablet 4     levothyroxine (SYNTHROID/LEVOTHROID) 50 MCG tablet Take 1 tablet (50 mcg) by mouth daily 90 tablet 1     losartan (COZAAR) 50 MG tablet Take 1 tablet (50 mg) by mouth daily 90 tablet 2     order for DME Rib belt 1 Device 0     pantoprazole (PROTONIX) 20 MG EC tablet Take 1 tablet (20 mg) by mouth daily 30 tablet 4     potassium chloride ER (KLOR-CON M) 10 MEQ CR tablet Take 2 tablets (20 mEq) by mouth daily 30 tablet 3     prochlorperazine (COMPAZINE) 10 MG tablet Take 1 tablet (10 mg) by mouth every 6 hours as needed (Nausea/Vomiting) 30 tablet 5     rosuvastatin (CRESTOR) 40 MG tablet TAKE ONE TABLET BY MOUTH ONCE DAILY. MUST SCHEDULE APPOINTMENT FOR ANNUAL FOLLOW UP FOR FURTHER REFILLS 90 tablet 1       Herbal Medication/Nutritional Supplements:  No issues     Smoking/Past Drug Use:  No issues     Nausea/Vomiting, Pain, or other issues:  No issues     Summary:  I met with June Ronquillo for approximately 30 minutes.  We discussed his current and transplant medications including the priming options,  conditioning chemotherapy, antiemetics, prophylactic antibiotics and miscellaneous medications (filgrastim, Claritin and vaccinations) ..

## 2021-10-19 NOTE — LETTER
"    10/19/2021         RE: June Ronquillo  [address]  [place] [address]        Dear Colleague,    Thank you for referring your patient, June Ronquillo, to the [facility] [place]. Please see a copy of my visit note below.    Pharmacy Assessment - Pre-Stem Cell Transplant    Assessments & Recommendations:  1) Hold hydrochlorothiazide until engraftment then resume  2) Suggest also holding rosuvastatin during acute phase of the transplant.   3) Ice cryotherapy 2-4 hours around the Melphalan   4) B-complex vitamin for neuropathy nerve repair   5) Steroids tend to make him somewhat crabby.     If this patient is admitted under observation, the patient may bring in their own supply of the following medication for use in the hospital:  1) Losartan,   -Per \"Medications Not Supplied by Pharmacy\" policy (available on [employer])    History of Present Illness:  June Ronquillo is a 51 year old year old male diagnosed with multiple myeloma.  he has been treated with Miracle+ RVd.  he is now being work up for autologous  Stem Cell Transplant on protocol [id number], which utilizes Melphalan as a conditioning regimen.    Pertinent labs/tests:  Viral Testing:  CMV(+) / HSV(+/-) / EBV(+) / VZV (?)  Ejection Fraction: 60-65% (10/13)  QTc: 454msec (10/13)    Weights:   Wt Readings from Last 3 Encounters:   10/18/21 72 kg (158 lb 11.7 oz)   10/18/21 73.5 kg (162 lb)   10/14/21 72 kg (158 lb 11.7 oz)   Ideal body weight: 62.3 kg (137 lb 6 oz)  Adjusted ideal body weight: 66.6 kg (146 lb 12.6 oz)  % IBW:  117  There is no height or weight on file to calculate BMI.    Primary BMT Physician:   BMT RN Coordinator:  Christine Reynolds    Past Medical History:  Past Medical History:   Diagnosis Date     CAD (coronary artery disease)     s/p stent to CFX     Heart attack (H)      Hyperlipidemia LDL goal <100      Hypertension      Stented coronary artery      Thyroid " disease        Medication Allergies:  Allergies   Allergen Reactions     Blood Transfusion Related (Informational Only) Other (See Comments)     Patient has a history of a clinically significant antibody against RBC antigens.  A delay in compatible RBCs may occur.      Nkda [No Known Drug Allergies]        Current Medications (pre-admit):  Current Outpatient Medications   Medication Sig Dispense Refill     acyclovir (ZOVIRAX) 400 MG tablet Take 1 tablet (400 mg) by mouth 2 times daily 60 tablet 6     aspirin (ASA) 325 MG tablet Take 1 tablet (325 mg) by mouth daily (Patient not taking: Reported on 10/13/2021) 30 tablet 4     calcium carb-cholecalciferol (OS-HARDEEP) 500-200 MG-UNIT tablet Take 1 tablet by mouth daily 30 tablet 11     dexamethasone (DECADRON) 4 MG tablet Take 20 mg (four tabs) by mouth on Day 2 15 tablet 0     FISH OIL 1000 MG OR CAPS 1 bid (Patient not taking: No sig reported)       hydrochlorothiazide (HYDRODIURIL) 50 MG tablet Take 1 tablet (50 mg) by mouth daily 30 tablet 3     LENalidomide (REVLIMID) 25 MG CAPS capsule Take 1 capsule (25 mg) by mouth daily for 14 days 14 capsule 0     levofloxacin (LEVAQUIN) 250 MG tablet Take 1 tablet (250 mg) by mouth daily 30 tablet 4     levothyroxine (SYNTHROID/LEVOTHROID) 50 MCG tablet Take 1 tablet (50 mcg) by mouth daily 90 tablet 1     losartan (COZAAR) 50 MG tablet Take 1 tablet (50 mg) by mouth daily 90 tablet 2     order for DME Rib belt 1 Device 0     pantoprazole (PROTONIX) 20 MG EC tablet Take 1 tablet (20 mg) by mouth daily 30 tablet 4     potassium chloride ER (KLOR-CON M) 10 MEQ CR tablet Take 2 tablets (20 mEq) by mouth daily 30 tablet 3     prochlorperazine (COMPAZINE) 10 MG tablet Take 1 tablet (10 mg) by mouth every 6 hours as needed (Nausea/Vomiting) 30 tablet 5     rosuvastatin (CRESTOR) 40 MG tablet TAKE ONE TABLET BY MOUTH ONCE DAILY. MUST SCHEDULE APPOINTMENT FOR ANNUAL FOLLOW UP FOR FURTHER REFILLS 90 tablet 1       Herbal  Medication/Nutritional Supplements:  No issues     Smoking/Past Drug Use:  No issues     Nausea/Vomiting, Pain, or other issues:  No issues     Summary:  I met with June Ronquillo for approximately 30 minutes.  We discussed his current and transplant medications including the priming options, conditioning chemotherapy, antiemetics, prophylactic antibiotics and miscellaneous medications (filgrastim, Claritin and vaccinations) ..      Again, thank you for allowing me to participate in the care of your patient.        Sincerely,        BMT Pharm D, RP

## 2021-10-19 NOTE — PROGRESS NOTES
CLINICAL SOCIAL WORK   PSYCHOSOCIAL ASSESSMENT  BLOOD AND MARROW TRANSPLANT SERVICE      Assessment completed on 2021 of living situation, support system, financial status, functional status, coping, stressors, need for resources and social work intervention provided as needed.  Information for this assessment was provided by Pt report in addition to medical chart review and consultation with medical team.     Present at assessment: Patient, June Ronquillo was present for this assessment conducted by BONY Farrar .     Diagnosis: Multiple Myeloma (MM)    Date of Diagnosis: 21    Transplant type: Autologous    Donor: Autologous     Physician: Nasim Means MD    Nurse Coordinator: Lorelei Medina RN    : IRASEMA Farrar, [facility]     Permanent Address:   [address] [place] [id number]    Contact Information:  Pt Home Phone: [phone]  Pt Cell Phone: [phone]  Pt Email: [email or web address]  Pt's wife Ginette's  Phone: [phone]    Presenting Information:  June is a 51 year old male diagnosed with MM who presents for evaluation for an autologous transplant at the [facility]  Pt was alone for today's telephone visit.     Decision Making:   Self     Health Care Directive:   Patient considering completing     Relationship Status:    to his wife Ginette for 25 years. He notes their relationship as stable and supportive.    Special Lodging Needs: None identified at this time.     Family/Support System: Pt endorsed a good support system including family and close friends who will be available to support Pt throughout transplant process.     Spouse: Ginette Ronquillo    Children: Hermilo (24) and Blade (22)    Grandchildren: 1 grandchild who is 2    Parents:     Siblings: 2 sisters who live in [place]    Friends: Many close friends and neighbors who are supportive    Caregiver: SW discussed with pt  the caregiver role and expectation at length. Pt is agreeable to having a full time caregiver for a minimum of 30 days until cleared by the BMT physician. Pt's identified caregivers are his wife and children. Pt signed the caregiver contract which will be scanned into the EMR. Caregiver education and resources provided. No caregiver concerns identified. Pt and Pt's wife confirmed understanding caregiving requirement, including driving restrictions, as discussed during psychosocial assessment.     Name & Numbers  Ginette Ronquillo    Transportation Mode:  Private Car . Pt is aware of driving restrictions post-BMT and the need for the caregiver is to drive until cleared to drive by the  BMT physician. SW provided information on parking info and monthly parking pass options. Pt will utilize the [employer] security shuttle for transportation to and from the [facility] and BMT Clinic/Hospital.    Insurance:  No Insurance issues identified.  Pt denied specific insurance concerns at this time. SW reiterated information about the BMT Financial  should specific insurance questions arise as Pt moves through transplant process.     Sources of Income:  No income concerns identified  June is currently working and collecting his salary. Pt denied anticipation of financial hardship related to BMT at this time.  SW encouraged Pt to contact this  for additional potential resources should financial situation change.     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  GAD7 assessment, score was 0, which indicates no current signs of anxiety.     We talked about how some patients may see an increase in feelings of  "anxiety or depression while hospitalized for extended periods along with isolation. Encouraged June to let us know if they are noticing an increase in symptoms. We talked about the variety of modalities available to use as coping mechanisms (including but not limited to guided imagery, relaxation techniques, progressive muscle relaxation, counseling/talk therapy and medication).    Chemical Use: No issues identified.  June denied the use of tobacco, alcohol, marijuana or other drugs. Based on the information provided, there appear to be no specific risks or concerns identified at this time.     Trauma/Loss/Abuse History: Multiple losses associated with cancer diagnosis and treatment, including health, employment, changes to physical appearance, etc.     Spirituality:  Patient identifies with helene community. June shared that he is spiritual and finds growth in this spiritual practices. He was raised in both in the Buddhist and Rastafari faiths.     Coping: Pt noted that he is currently feeling \"ready to go, but having a little fear of the unknown\".  Pt shared that his main coping mechanisms are cooking , drumming and gardening.  Pt noted that he also manjinder by telling stories and travel. SW and Pt discussed additional positive coping mechanisms that Pt can utilize while in the hospital.     Caregiver Coping: Not present for assessment    Education Provided: Transplant process expectations, Caregiver requirements, Caregiver self-care, Financial issues related to transplant, Financial resources/grants available, Common psychosocial stressors pre/post transplant, Support group(s) available, Tour/layout of the inpatient unit/non-use of cell phones, Hospital resources available, Web site information, Resources for transplant patients and their families as well as the Clinical Social Work role.     Interventions Provided: Supportive counseling and education     Recreation/Leisure Activities:  Kurtismaria dolores " shared he enjoys cooking, traveling, gardening and drumming    Plans for Hospital Stay Leisure:  While IP he plans to read, play games and use the Internet.     Assessment and Recommendations for Team:  Pt is a 51 year old male diagnosed with MM who is here undergoing preparation for a planned autologous transplant.     Pt is a pleasant and articulate male who feels comfortable communicating with the medical team. Pt has a good support team who are involved.     Pt has developed strong coping mechanisms such as; as story telling and his spiritual practice. 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     Pt may benefit from ongoing psychosocial support in regards to coping with the adjustment to the BMT process. Pt's family may benefit from ongoing psychosocial support in regards to coping with the adjustment to the BMT process and may also benefit from attending the BMT Caregiver Support Group that meets weekly on the inpatient unit.     Pt has a good support system and a good caregiver plan. Pt verbalizes understanding of the transplant process and wanting to proceed. SW provided contact information and encouraged Pt to contact SW with questions, concerns, resources and for support. Per this assessment, I did not identify any barriers to this patient moving forward with transplant      Important Information:   - June's family will be his caregivers.    Follow up Planned:   Psychosocial support      IRASEMA Farrar, [facility]  Pager: [phone]  Phone: [phone]

## 2021-10-19 NOTE — TELEPHONE ENCOUNTER
"Last prescribing provider: Dr. Barbi Vazquez    Last clinic visit date: 9/8/21    Any missed appointments or no-shows since last clinic visit?: No    Recommendations for requested medication (if none, N/A): Copied from chart note 9/8/21 Dr. Vazquez:  \"Levofloxacin 250 mg every day,  mg every day, and acyclovir 400 mg BID for prophylaxis\"    Next clinic visit date: None scheduled    Any other pertinent information (if none, N/A): N/A   "

## 2021-10-19 NOTE — PROGRESS NOTES
BMT Teaching Flowsheet    June Ronquillo is a 51 year old male  The encounter diagnosis was Multiple myeloma not having achieved remission (H).    Teaching Topic: [id number] MM    Person(s) involved in teaching: Patient  Motivation Level  Asks Questions: Yes  Eager to Learn: Yes  Cooperative: Yes  Receptive (willing/able to accept information): Yes  Any cultural factors/Rastafarian beliefs that may influence understanding or compliance? No    Patient demonstrates understanding of the following:  - Reason for the appointment, diagnosis and treatment plan: Yes  - Knowledge of proper use of medications and conditions for which they are ordered (with special attention to potential side effects or drug interactions): Yes  - Which situations necessitate calling provider and whom to contact: Yes    Teaching concerns addressed: Asked some (few) questions that demonstrated understanding.     Proper use and care of (medical equipment, care aids, etc.) NA  Pain management techniques: Yes  Patient instructed on hand hygiene: Yes  How and/when to access community resources: Yes    Infection Control:  Patient demonstrates understanding of the following:  Surgical procedure site care taught No, explain: pre-/post op instructions  Signs and symptoms of infection taught Yes  Wound care taught NA  Central venous catheter care taught No, explain: PLC during workup    Instructional Materials Used/Given:   Autologous BMT patient Binder with protocol specific supporting documents.    Research participant June Ronquillo was provided the information on the Research Participant Information Sheet by Christine Reynolds RN on October 19, 2021. This document contains information regarding the risks of participating in a research study during the COVID-19 pandemic. Receipt of the information sheet was confirmed by study personnel prior to the visit.    Time spent with patient: 90 minutes.      Specific Concerns: No, explain: Expresses  understanding. Questions answered to satisfaction

## 2021-10-19 NOTE — LETTER
10/19/2021         RE: June Ronquillo  [address] [place] [id number]        Dear Colleague,    Thank you for referring your patient, June Ronquillo, to the [facility] BLOOD AND MARROW TRANSPLANT PROGRAM [place]. Please see a copy of my visit note below.    CLINICAL SOCIAL WORK   PSYCHOSOCIAL ASSESSMENT  BLOOD AND MARROW TRANSPLANT SERVICE      Assessment completed on October 19, 2021 of living situation, support system, financial status, functional status, coping, stressors, need for resources and social work intervention provided as needed.  Information for this assessment was provided by Pt report in addition to medical chart review and consultation with medical team.     Present at assessment: Patient, June Ronquillo was present for this assessment conducted by BONY Farrar .     Diagnosis: Multiple Myeloma (MM)    Date of Diagnosis: 4/30/21    Transplant type: Autologous    Donor: Autologous     Physician: Nasim Means MD    Nurse Coordinator: Lorelei Medina RN    : IRASEMA Farrar, [facility]     Permanent Address:   [address] [place] [id number]    Contact Information:  Pt Home Phone: [phone]  Pt Cell Phone: [phone]  Pt Email: [email or web address]  Pt's wife Ginette's  Phone: [phone]    Presenting Information:  June is a 51 year old male diagnosed with MM who presents for evaluation for an autologous transplant at the [facility] ([facility]).  Pt was alone for today's telephone visit.     Decision Making:   Self     Health Care Directive:   Patient considering completing     Relationship Status:    to his wife Ginette for 25 years. He notes their relationship as stable and supportive.    Special Lodging Needs: None identified at this time.     Family/Support System: Pt endorsed a good support system including family and close friends who will be available to support Pt  throughout transplant process.     Spouse: Ginette Ronquillo    Children: Hermilo (24) and Blade (22)    Grandchildren: 1 grandchild who is 2    Parents:     Siblings: 2 sisters who live in [place]    Friends: Many close friends and neighbors who are supportive    Caregiver: KENYON discussed with pt the caregiver role and expectation at length. Pt is agreeable to having a full time caregiver for a minimum of 30 days until cleared by the BMT physician. Pt's identified caregivers are his wife and children. Pt signed the caregiver contract which will be scanned into the EMR. Caregiver education and resources provided. No caregiver concerns identified. Pt and Pt's wife confirmed understanding caregiving requirement, including driving restrictions, as discussed during psychosocial assessment.     Name & Numbers  Ginette Ronquillo    Transportation Mode:  Private Car . Pt is aware of driving restrictions post-BMT and the need for the caregiver is to drive until cleared to drive by the  BMT physician. SW provided information on parking info and monthly parking pass options. Pt will utilize the hospital security shuttle for transportation to and from the [facility] and BMT Clinic/Hospital.    Insurance:  No Insurance issues identified.  Pt denied specific insurance concerns at this time. SW reiterated information about the BMT Financial  should specific insurance questions arise as Pt moves through transplant process.     Sources of Income:  No income concerns identified  June is currently working and collecting his salary. Pt denied anticipation of financial hardship related to BMT at this time.  SW encouraged Pt to contact this SW for additional potential resources should financial situation change.     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  GAD7 assessment, score was 0, which indicates no current signs of anxiety.     We talked about how some patients may see an increase in feelings of anxiety or depression while hospitalized for extended periods along with isolation. Encouraged June to let us know if they are noticing an increase in symptoms. We talked about the variety of modalities available to use as coping mechanisms (including but not limited to guided imagery, relaxation techniques, progressive muscle relaxation, counseling/talk therapy and medication).    Chemical Use: No issues identified.  June denied the use of tobacco, alcohol, marijuana or other drugs. Based on the information provided, there appear to be no specific risks or concerns identified at this time.     Trauma/Loss/Abuse History: Multiple losses associated with cancer diagnosis and treatment, including health, employment, changes to physical appearance, etc.     Spirituality:  Patient identifies with helene community. June shared that he is spiritual and finds growth in this spiritual practices. He was raised in both in the [employer] and Islam faiths.     Coping: Pt noted that he is currently feeling \"ready to go, but having a little fear of the unknown\".  Pt shared that his main coping mechanisms are cooking , drumming and gardening.  Pt noted that he also manjinder by telling stories and travel. SW and Pt discussed additional positive coping mechanisms that Pt can utilize while in the hospital.     Caregiver Coping: Not present for assessment    Education Provided: Transplant process expectations, Caregiver requirements, Caregiver self-care, Financial issues related to transplant, Financial resources/grants available, Common psychosocial stressors pre/post transplant, Support group(s) available, Tour/layout of " the inpatient unit/non-use of cell phones, Hospital resources available, Web site information, Resources for transplant patients and their families as well as the Clinical Social Work role.     Interventions Provided: Supportive counseling and education     Recreation/Leisure Activities:  June shared he enjoys cooking, traveling, gardening and drumming    Plans for Hospital Stay Leisure:  While IP he plans to read, play games and use the Internet.     Assessment and Recommendations for Team:  Pt is a 51 year old male diagnosed with MM who is here undergoing preparation for a planned autologous transplant.     Pt is a pleasant and articulate male who feels comfortable communicating with the medical team. Pt has a good support team who are involved.     Pt has developed strong coping mechanisms such as; as story telling and his spiritual practice. 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     Pt may benefit from ongoing psychosocial support in regards to coping with the adjustment to the BMT process. Pt's family may benefit from ongoing psychosocial support in regards to coping with the adjustment to the BMT process and may also benefit from attending the BMT Caregiver Support Group that meets weekly on the inpatient unit.     Pt has a good support system and a good caregiver plan. Pt verbalizes understanding of the transplant process and wanting to proceed. SW provided contact information and encouraged Pt to contact SW with questions, concerns, resources and for support. Per this assessment, I did not identify any barriers to this patient moving forward with transplant      Important Information:   - June's family will be his caregivers.    Follow up Planned:   Psychosocial support      IRASEMA Farrar, [facility] HospitalSW  [facility]  Pager: [phone]  Phone: [phone]                  Again,  thank you for allowing me to participate in the care of your patient.        Sincerely,        JOLENE GodoySW

## 2021-10-19 NOTE — PROGRESS NOTES
Blood and Marrow Transplant Program      June Ronquillo is a 51 year old male with myeloma, here for formal review prior to HCT.    Oncologic Hx:   - 4/30/2021 M spike of 34.8 in urine.M spike in blood 0.2; IgG 702 with depressed IgA and IgM. Beta 2 microglobulin 2.7. Lambda  with K/L ratio of 0.01. WBC 11.1 with absolute eos of 1.0. hgb, plt, Cr (1.1), and albumin WNL.     -4/30/2021 CT abd/pelvis showed sclerotic marrow changes with numerous lytic appearing lesions throughout the imaged portions of the spine, pelvis and ribs.      -PET scan 5/11/2021 Numerous osteolytic lesions which predominantly demonstrate uptake below liver background levels. There is one intraosseous lesion in the left lateral 9th rib that demonstrates uptake above background, possibly due to nondisplaced fracture. Adjacent to this lesion is mild  hypermetabolism to the left pleura, with new small left pleural effusion, suspicious for malignant effusion versus posttraumatic effusion. Pleural uptake may represent extramedullary myeloma extending along the intercostal space versus inflammation.     - BM bx 5/17/2021 with flow showing 4.0% plasma cells which express CD19 (dim), CD38 (bright), CD45 (dim), , and monotypic cytoplasmic lambda immunoglobulin light chains but lack CD20 and CD56.  Hypercellular bone marrow for age (approximately 75% cellularity) with adequate trilineage hematopoiesis. Plasma cell infiltrate: Interstitial, lambda monotypic and representing approximately 60% the bone marrow cellularity, by  immunohistochemical stain. Cytpgenetics with 2 related hypodiploid clones. One with loss of Y, monosomy 13 and 14 (5%) and one with translocation of 8p and 14, derivative 16 with long arm replaced by extra copy 1q,monosomy 21. FISH showed gain of 1q, loss of 13 and 14, IGH-MYC fusion t(8:14)     - Started Miracle-RVd 21 day with velcade on days 1,8,15.      -Cycle 2 Miracle-RVd. Dose reduce dex to 80 mg d/t fatigue  and stomach upset on dex days. Also having cough with basilar streaking on CXR     - 8/31/2021 BM bx -rare suspicious plasma cells in touch imprint. No increase in plasma cells by morph.     -8/31/2021 PET/CT Diffuse osteolytic lesions throughout the axial and appendicular skeleton. Increased sclerosis since prior exam 5/11/2021 without increased metabolism or size.  Hypermetabolic pretracheal lymph node as well as hypermetabolic level 2 lymph nodes within the right neck. These lymph nodes are likely reactive from autoimmune activation via medical therapy. Hypermetabolic subcentimeter nodules within the thyroid gland    Oncology History   Multiple myeloma not having achieved remission (H)   5/13/2021 Initial Diagnosis    Multiple myeloma not having achieved remission (H)     5/17/2021 -  Chemotherapy    OP ONC Multiple Myeloma - Daratumumab Hyaluronidase (Darzalex Faspro) / Bortezomib / Dexamethasone (Age </= 74 yo AND BMI >/= 18.5)  Plan Provider: Barbi Vazquez MD  Treatment goal: Curative  Line of treatment: Induction     9/17/2021 -  Chemotherapy    OP ONC Zoledronic Acid (Zometa) MONTHLY  Plan Provider: Barbi Vazquez MD  Treatment goal: Palliative  Line of treatment: Maintenance         Interval History      He came to clinic today to make final preparation for the collection of peripheral blood stem cells for upcoming high-dose melphalan.  He states that he has been feeling quite good, better than he has for some time, and enjoying the time off of treatment.  He has been working, walking, eating and drinking well, and living a full life.  He has few adverse effects remaining from any treatment he received and his only symptom is mild upper leg pain which she attributes to weakness and some discomfort following walking.  He has no infectious complications, is generally feeling well, has no recent exposures that he is aware of, and is happy about his quality of life.  He has been well educated  regarding upcoming process and has few questions today      Family History:   Family History   Problem Relation Age of Onset     Hypertension Mother      Hyperlipidemia Mother      Heart Disease Paternal Grandmother      Hyperlipidemia Sister      Hyperlipidemia Sister        Social History:   Social History     Socioeconomic History     Marital status:      Spouse name: Not on file     Number of children: Not on file     Years of education: Not on file     Highest education level: Not on file   Occupational History     Not on file   Tobacco Use     Smoking status: Never Smoker     Smokeless tobacco: Never Used   Substance and Sexual Activity     Alcohol use: Not Currently     Drug use: No     Sexual activity: Yes     Partners: Female   Other Topics Concern     Parent/sibling w/ CABG, MI or angioplasty before 65F 55M? No      Service Not Asked     Blood Transfusions Not Asked     Caffeine Concern No     Occupational Exposure Not Asked     Hobby Hazards Not Asked     Sleep Concern Not Asked     Stress Concern Not Asked     Weight Concern Not Asked     Special Diet No     Back Care Not Asked     Exercise No     Bike Helmet Not Asked     Seat Belt Yes     Self-Exams Not Asked   Social History Narrative     Not on file     Social Determinants of Health     Financial Resource Strain:      Difficulty of Paying Living Expenses:    Food Insecurity:      Worried About Running Out of Food in the Last Year:      Ran Out of Food in the Last Year:    Transportation Needs:      Lack of Transportation (Medical):      Lack of Transportation (Non-Medical):    Physical Activity:      Days of Exercise per Week:      Minutes of Exercise per Session:    Stress:      Feeling of Stress :    Social Connections:      Frequency of Communication with Friends and Family:      Frequency of Social Gatherings with Friends and Family:      Attends Denominational Services:      Active Member of Clubs or Organizations:      Attends Club or  Organization Meetings:      Marital Status:    Intimate Partner Violence: Not At Risk     Fear of Current or Ex-Partner: No     Emotionally Abused: No     Physically Abused: No     Sexually Abused: No       Past Medical History:   Past Medical History:   Diagnosis Date     CAD (coronary artery disease)     s/p stent to CFX     Heart attack (H)      Hyperlipidemia LDL goal <100      Hypertension      Stented coronary artery      Thyroid disease         Past Surgical History:   Past Surgical History:   Procedure Laterality Date     BONE MARROW BIOPSY, BONE SPECIMEN, NEEDLE/TROCAR Left 2021    Procedure: BIOPSY, BONE MARROW with aspiration and ancillary studies;  Surgeon: Niharika Regalado MD;  Location: [facility] OR     BONE MARROW BIOPSY, BONE SPECIMEN, NEEDLE/TROCAR Left 10/14/2021    Procedure: BIOPSY, BONE MARROW;  Surgeon: Alexa Albert APRN CNP;  Location: [facility] OR     HEART CATH PTCA SINGLE VESSEL  10/10/2004    Stent to CFX - [facility]        Allergies:   Allergies   Allergen Reactions     Blood Transfusion Related (Informational Only) Other (See Comments)     Patient has a history of a clinically significant antibody against RBC antigens.  A delay in compatible RBCs may occur.      Nkda [No Known Drug Allergies]        KPS: 90  ECO    Home Medications      Prior to Admission medications    Medication Sig Start Date End Date Taking? Authorizing Provider   acyclovir (ZOVIRAX) 400 MG tablet Take 1 tablet (400 mg) by mouth 2 times daily 21   Barbi Vazquez MD   aspirin (ASA) 325 MG tablet Take 1 tablet (325 mg) by mouth daily  Patient not taking: Reported on 10/13/2021 5/19/21   Barbi Vazquez MD   calcium carb-cholecalciferol (OS-HARDEEP) 500-200 MG-UNIT tablet Take 1 tablet by mouth daily 21   Barbi Vazquez MD   dexamethasone (DECADRON) 4 MG tablet Take 20 mg (four tabs) by mouth on Day 2 21   Barbi Vazquez MD   FISH OIL 1000 MG OR CAPS 1 bid  Patient  not taking: No sig reported    Reported, Patient   hydrochlorothiazide (HYDRODIURIL) 50 MG tablet Take 1 tablet (50 mg) by mouth daily 7/15/21   Costa Saha MD   LENalidomide (REVLIMID) 25 MG CAPS capsule Take 1 capsule (25 mg) by mouth daily for 14 days 9/20/21 10/4/21  Barbi Vazquez MD   levofloxacin (LEVAQUIN) 250 MG tablet Take 1 tablet (250 mg) by mouth daily 5/19/21   Barbi Vazquez MD   levothyroxine (SYNTHROID/LEVOTHROID) 50 MCG tablet Take 1 tablet (50 mcg) by mouth daily 8/4/21   Selma Johnson APRN CNP   losartan (COZAAR) 50 MG tablet Take 1 tablet (50 mg) by mouth daily 7/2/21   Ip, Dominguez Garcia MD   order for DME Rib belt 7/25/20   Werner Rivas, PAMarilinC   pantoprazole (PROTONIX) 20 MG EC tablet Take 1 tablet (20 mg) by mouth daily 6/30/21   Barbi Vazquez MD   potassium chloride ER (KLOR-CON M) 10 MEQ CR tablet Take 2 tablets (20 mEq) by mouth daily 10/12/21   Barbi Vazquez MD   prochlorperazine (COMPAZINE) 10 MG tablet Take 1 tablet (10 mg) by mouth every 6 hours as needed (Nausea/Vomiting) 5/17/21   Barbi Vazquez MD   rosuvastatin (CRESTOR) 40 MG tablet TAKE ONE TABLET BY MOUTH ONCE DAILY. MUST SCHEDULE APPOINTMENT FOR ANNUAL FOLLOW UP FOR FURTHER REFILLS 10/4/21   Selma Johnson APRN CNP       Review of Systems    Review of Systems   Constitutional: Positive for fatigue. Negative for appetite change, chills and fever.   HENT:  Negative.         Last dental visit one month ago   Eyes: Negative.    Respiratory: Negative.    Cardiovascular: Negative.    Gastrointestinal: Negative.    Genitourinary: Negative.     Musculoskeletal:        Mild upper leg pain in his thighs   Skin: Negative.    Neurological: Negative.    Hematological: Negative.    Psychiatric/Behavioral: Negative.        PHYSICAL EXAM      Weight     Wt Readings from Last 3 Encounters:   10/20/21 72.8 kg (160 lb 9.6 oz)   10/18/21 72 kg (158 lb 11.7 oz)   10/18/21 73.5 kg (162 lb)           BP (!) 135/93   Pulse 84   Temp 98  F (36.7  C) (Oral)   Resp 16   Wt 72.8 kg (160 lb 9.6 oz)   SpO2 100%   BMI 25.93 kg/m       Physical Exam  Constitutional:       General: He is not in acute distress.     Appearance: Normal appearance. He is normal weight.   HENT:      Nose: Nose normal. No congestion.      Mouth/Throat:      Mouth: Mucous membranes are moist.      Pharynx: Oropharynx is clear. No oropharyngeal exudate.   Eyes:      Conjunctiva/sclera: Conjunctivae normal.   Cardiovascular:      Rate and Rhythm: Normal rate and regular rhythm.      Pulses: Normal pulses.      Heart sounds: Normal heart sounds. No murmur heard.     Pulmonary:      Effort: Pulmonary effort is normal.      Breath sounds: Normal breath sounds.   Abdominal:      General: Abdomen is flat. Bowel sounds are normal.      Palpations: Abdomen is soft.   Musculoskeletal:         General: No swelling. Normal range of motion.      Cervical back: Normal range of motion.   Lymphadenopathy:      Cervical: No cervical adenopathy.   Skin:     General: Skin is warm.   Neurological:      General: No focal deficit present.      Mental Status: He is alert and oriented to person, place, and time.   Psychiatric:         Mood and Affect: Mood normal.         Behavior: Behavior normal.        Relevant Results from Multiple Myeloma Workup:   CBC with platelets and differential   Result Value Ref Range    WBC Count 6.7 4.0 - 11.0 10e3/uL    RBC Count 4.27 (L) 4.40 - 5.90 10e6/uL    Hemoglobin 13.0 (L) 13.3 - 17.7 g/dL    Hematocrit 38.7 (L) 40.0 - 53.0 %    MCV 91 78 - 100 fL    MCH 30.4 26.5 - 33.0 pg    MCHC 33.6 31.5 - 36.5 g/dL    RDW 14.6 10.0 - 15.0 %    Platelet Count 456 (H) 150 - 450 10e3/uL    % Neutrophils 49 %    % Lymphocytes 32 %    % Monocytes 13 %    % Eosinophils 6 %    % Basophils 0 %    % Immature Granulocytes 0 %    NRBCs per 100 WBC 0 <1 /100    Absolute Neutrophils 3.3 1.6 - 8.3 10e3/uL    Absolute Lymphocytes 2.1 0.8 -  5.3 10e3/uL    Absolute Monocytes 0.8 0.0 - 1.3 10e3/uL    Absolute Eosinophils 0.4 0.0 - 0.7 10e3/uL    Absolute Basophils 0.0 0.0 - 0.2 10e3/uL    Absolute Immature Granulocytes 0.0 <=0.0 10e3/uL    Absolute NRBCs 0.0 10e3/uL   *CBC with platelets differential    Narrative    The following orders were created for panel order *CBC with platelets differential.  Procedure                               Abnormality         Status                     ---------                               -----------         ------                     CBC with platelets and d...[[id number]]  Abnormal            Final result                 Please view results for these tests on the individual orders.   Comprehensive metabolic panel   Result Value Ref Range    Sodium 139 133 - 144 mmol/L    Potassium 3.3 (L) 3.4 - 5.3 mmol/L    Chloride 107 94 - 109 mmol/L    Carbon Dioxide (CO2) 24 20 - 32 mmol/L    Anion Gap 8 3 - 14 mmol/L    Urea Nitrogen 15 7 - 30 mg/dL    Creatinine 0.96 0.66 - 1.25 mg/dL    Calcium 8.8 8.5 - 10.1 mg/dL    Glucose 119 (H) 70 - 99 mg/dL    Alkaline Phosphatase 64 40 - 150 U/L    AST 18 0 - 45 U/L    ALT 17 0 - 70 U/L    Protein Total 7.0 6.8 - 8.8 g/dL    Albumin 4.2 3.4 - 5.0 g/dL    Bilirubin Total 0.5 0.2 - 1.3 mg/dL    GFR Estimate 90 >60 mL/min/1.73m2   Relevant Results from Multiple Myeloma Workup:   XR Chest 2 Views    Narrative    Chest 2 views    INDICATION: Multiple myeloma    COMPARISON: PET/CT 8/31/2021 an chest radiographs 6/25/2021    FINDINGS: Heart size appears normal. Lungs and pulmonary vascularity  appear normal. Recent PET CT demonstrated multiple osteolytic lesions  throughout the body including lesions at T6 and T11 and T12, relative  lucency of the midportion of T12 vertebral body correlates reasonably  well with the PETCT finding from 8/31/2021 although this is inexact.    COMPARISON: Possible lucency demonstrated and T12 much better  visualized on PET/CT from 8/31/2021. Otherwise  unremarkable.    CASSANDRA RUSSO MD         SYSTEM ID:  [id number]   Relevant Results from Multiple Myeloma Workup:   Creatinine Clearance, Urine   Result Value Ref Range    Height in cm 166 cm    Weight in kg 73.0 kg    Raw Clearance 95 mL/min    Standard Clearance 90 mL/min/1.7m2    Volume in mL 1,850 mL    Duration in hours 24.0 h    Creatinine 1.01 mg/dL    Creatinine Urine mg/dL 75 mg/dL    Creatinine Urine Timed g/spec 1.39 1.00 - 2.00 g/spec   Creatinine, serum   Result Value Ref Range    Creatinine 0.96 0.66 - 1.25 mg/dL   Protein immunofixation urine   Result Value Ref Range    Immunofixation ELP Urine       No monoclonal protein seen on immunofixation. Pathological significance requires clinical correlation. KAMALJIT Partida M.D., Ph.D  Pathologist ([phone])     Protein electrophoresis timed urine   Result Value Ref Range    Albumin Urine 100.0 (H) <=0.0 %    Monocloncal Peak Urine % 0.0 <=0.0 %    ELP Interpretation Urine       Only trace albumin seen. No obvious monoclonal protein seen and the absence of monoclonal immunoglobulins was confirmed by immunofixation of this same urine sample. Pathological significance requires clinical correlation.  KAMALJIT Partida M.D., Ph.D.,   Pathologist ()   Creatinine clearance    Narrative    The following orders were created for panel order Creatinine clearance.  Procedure                               Abnormality         Status                     ---------                               -----------         ------                     Creatinine Clearance, Urine[[id number]]                      Final result               Creatinine, serum[[id number]]            Normal              Final result                 Please view results for these tests on the individual orders.   Relevant Results from Multiple Myeloma Workup:   INR   Result Value Ref Range    INR 0.95 0.85 - 1.15   Relevant Results from Multiple Myeloma Workup:   Protein Electrophoresis, Serum    Result Value Ref Range    Albumin 4.7 3.7 - 5.1 g/dL    Alpha 1 0.3 0.2 - 0.4 g/dL    Alpha 2 0.8 0.5 - 0.9 g/dL    Beta Globulin 0.8 0.6 - 1.0 g/dL    Gamma Globulin 0.5 (L) 0.7 - 1.6 g/dL    Monoclonal Peak 0.1 (H) <=0.0 g/dL    ELP Interpretation       Very small monoclonal protein (0.1 g/dL) seen in the gamma fraction. See immunofixation report on same specimen. Hypogammaglobulinemia. Pathologic significance requires clinical correlation.  Rizwan Aec M.D., Ph.D., Pathologist.   Total Protein, Serum   Result Value Ref Range    Total Protein Serum for ELP 7.0 6.8 - 8.8 g/dL   Adult Type and Screen   Result Value Ref Range    ABO/RH(D) A POS     Antibody Screen Positive (A) Negative    SPECIMEN EXPIRATION DATE [id number]    HBV HCV HIV WNV by [facility]- [facility]END [facility]   Result Value Ref Range    HEPATITIS B BY HILARIO Non-Reactive     HCV by HILARIO Non-Reactive     HIV By Hilario Non-Reactive     West Nile Virus By HILARIO Non-Reactive     Narrative    Verified by Agusto Iyer on 10/17/2021.   Ligonier and lambda light chain   Result Value Ref Range    Kappa Free Light Chains 0.66 0.33 - 1.94 mg/dL    Lambda Free Light Chains 0.63 0.57 - 2.63 mg/dL    Kappa /Lambda Ratio 1.05 0.26 - 1.65   Partial thromboplastin time   Result Value Ref Range    aPTT 31 22 - 38 Seconds   Immunoglobulins A G and M   Result Value Ref Range    Immunoglobulin G 472 (L) 610-1,616 mg/dL    Immunoglobulin A 52 (L) 84 - 499 mg/dL    Immunoglobulin M 14 (L) 35 - 242 mg/dL   Protein electrophoresis    Narrative    The following orders were created for panel order Protein electrophoresis.  Procedure                               Abnormality         Status                     ---------                               -----------         ------                     Total Protein, Serum[[id number]]         Normal              Final result               Protein Electrophoresis,...[[id number]]  Abnormal            Final result                  Please view results for these tests on the individual orders.   Beta 2 microglobuline   Result Value Ref Range    Beta-2-Microglobulin 1.9 <2.3 mg/L   ABO/Rh type and screen    Narrative    The following orders were created for panel order ABO/Rh type and screen.  Procedure                               Abnormality         Status                     ---------                               -----------         ------                     Adult Type and Screen[[id number]]        Abnormal            Edited Result - FINAL        Please view results for these tests on the individual orders.   Relevant Results from Multiple Myeloma Workup:   Hepatitis B surface antigen   Result Value Ref Range    Hep B Surface Agn Nonreactive NR^Nonreactive   Hepatitis A antibody IgM   Result Value Ref Range    Hepatitis A IgM Susanne Nonreactive NR^Nonreactive       OVERALL ASSESSMENT AND PLAN     Multiple myeloma-he has high risk myeloma by virtue of the 1 q. abnormality and has been treated aggressively with daratumumab, Velcade, Revlimid, dexamethasone.  He has achieved a very good partial remission and has a bone marrow aspirate and biopsy that appear normal without involvement.  I identified no specific contraindications or problems to proceeding.    Notable findings identified in the Pre-HSCT workup:     Mild reduction in DLCO, and a history of CAD for his relevant comorbidities.  He does have a history of hypothyroidism and will see endocrinology next week.    Summary:  June Ronquillo is a 51 year old male with myeloma in a VGPR remission, which is currently incurable by standard chemotherapeutic approaches. Based upon my assessment of disease risk and comorbidities, June is a suitable candidate for autologous PBSCT for the purpose of extending overall survival.    We had a detailed discussion about the rationale for transplant in this situation, requirements for proceeding, which include insurance approval,  identification of an adequate source of autologous hematopoetic progenitor cells, availability of a full-time caregiver along with residence within 30 minutes of the [place]  through at least day +28 post transplant, and compliance with the monitoring and supportive care plan prescribed by providers at the [facility].      We had a jorge discussion about the risks of the transplant itself, including toxicities from the preparative regimen, severe infections, the need for red blood cell and platelet transfusion support, the potential for severe organ toxicity including lungs, liver, skin, and kidneys, the possibility of long-term disability, and the risk of death due to complications of the transplant.  We discussed the risk of disease relapse despite going forward with a transplant.  June expressed understanding of these risks.    BMT and Cell Therapy Informed Consent Discussion  In today's visit, we discussed in detail the research for which June Ronquillo is eligible. We discussed the potential risks and potential benefits of each protocol individually. We explained potential alternatives to the protocols discussed. We explained to the patient that participation is voluntary and that consent may be withdrawn at any time.     The patient completed the last round of treatment on October 1.    HCT-CI score: 3 (2 for DLCO, 1 for CAD). We counseled the patient about the impact of this on the risk of treatment related and overall mortality. The score fit within treatment protocol eligibility criteria.    Karnofsky performance score: 90     ECOG (required for CAR-T, see KPS to ECOG conversion chart): 0  Lansky play score (pedatrics): NA    Active infections:  none.  Prior infections that require additional special prophylaxis considerations: none.  I reviewed and discussed infectious disease evaluation with the patient and the management plan during treatment.    Reproductive status: What methods of birth control  does the patient plan to use during the treatment period beginning with conditioning and ending with the discontinuation of immune suppression (indicate with an X all that apply):  __ The patient is confirmed to be sterile or post-menopausal  __ Sexual abstinence  __ Condoms  __ Implants  __ Injectables  __ Oral contraceptives  __ Intrauterine devices (IUD)  __ Other (describe)    The patient received appropriate reproductive counseling and agreed with the need for effective contraception during the treatment procedures.      Dental health suitable to proceed: Yes    Transplants for multiple myeloma:  The patient has High risk MM (defined as ONE or more of the following: del17p, p53 mutation, t(4;14), t(14;16), t(14;20), abnormal chromosome 1, -13 [by metaphase testing], or plasma cell leukemia), and the collection goal is 5 million CD34/kg for a single transplant..  The day for admission to begin melphalan conditioning is Day -1 for melphalan 200 mg/m2 (not frail, creatinine clearance 30+). .    After our detailed discussion above, the patient signed the following consents for treatment and protocols:    Standard of care HD-Soila       The patient will receive a signed copy of the consents. The patient had opportunity to ask questions that were answered to the best of my ability and to the patient's apparent satisfaction.    June Ronquillo had all questions answered and is ready to proceed as discussed in detail today.    Total time: 70, with greater than 50% of the time spend in direct counseling    ITALIA GARNER MD  October 20, 2021      Patient Care Team:  Selma Johnson APRN CNP as PCP - General (Nurse Practitioner - Family)  Jose Ronquillo MD as MD (Dermatology)  Dominguez Craig MD as Assigned Heart and Vascular Provider  Selma Johnson APRN CNP as Assigned PCP  Barbi Vazquez MD as MD (Hematology & Oncology)  Sho Mckeon, RN as Specialty Care Coordinator (Hematology &  Oncology)  Lazaro Rayo MD as MD (Dermapathology)  Barbi Vazquez MD as Assigned Cancer Care Provider  Makeda Elder MD as MD (Endocrinology, Diabetes, and Metabolism)  Tato Dominique DO as Assigned Musculoskeletal Provider  Christine Reynolds, RN as BMT Nurse Coordinator  Nasim Means MD as BMT Physician (Transplant)  Christine Reynolds RN as BMT Nurse Coordinator  Ema Antunez LICSW as  (BMT - Adult)

## 2021-10-19 NOTE — LETTER
10/19/2021         RE: June Ronquillo  [address]  [facility] [id number]        Dear Colleague,    Thank you for referring your patient, June Ronquillo, to the [facility]LOOD AND MARROW TRANSPLANT PROGRAM [place]. Please see a copy of my visit note below.    BMT Teaching Flowsheet    June Ronquillo is a 51 year old male  The encounter diagnosis was Multiple myeloma not having achieved remission (H).    Teaching Topic: [id number] MM    Person(s) involved in teaching: Patient  Motivation Level  Asks Questions: Yes  Eager to Learn: Yes  Cooperative: Yes  Receptive (willing/able to accept information): Yes  Any cultural factors/Hindu beliefs that may influence understanding or compliance? No    Patient demonstrates understanding of the following:  - Reason for the appointment, diagnosis and treatment plan: Yes  - Knowledge of proper use of medications and conditions for which they are ordered (with special attention to potential side effects or drug interactions): Yes  - Which situations necessitate calling provider and whom to contact: Yes    Teaching concerns addressed: Asked some (few) questions that demonstrated understanding.     Proper use and care of (medical equipment, care aids, etc.) NA  Pain management techniques: Yes  Patient instructed on hand hygiene: Yes  How and/when to access community resources: Yes    Infection Control:  Patient demonstrates understanding of the following:  Surgical procedure site care taught No, explain: pre-/post op instructions  Signs and symptoms of infection taught Yes  Wound care taught NA  Central venous catheter care taught No, explain: PLC during workup    Instructional Materials Used/Given:   Autologous BMT patient Binder with protocol specific supporting documents.    Research participant June Ronquillo was provided the information on the Research Participant Information Sheet by Christine Reynolds RN on October 19, 2021. This  document contains information regarding the risks of participating in a research study during the COVID-19 pandemic. Receipt of the information sheet was confirmed by study personnel prior to the visit.    Time spent with patient: 90 minutes.      Specific Concerns: No, explain: Expresses understanding. Questions answered to satisfaction      Again, thank you for allowing me to participate in the care of your patient.        Sincerely,        BMT Nurse Coordinator

## 2021-10-20 ENCOUNTER — APPOINTMENT (OUTPATIENT)
Dept: LAB | Facility: CLINIC | Age: 51
End: 2021-10-20
Attending: INTERNAL MEDICINE
Payer: COMMERCIAL

## 2021-10-20 ENCOUNTER — MYC MEDICAL ADVICE (OUTPATIENT)
Dept: ONCOLOGY | Facility: CLINIC | Age: 51
End: 2021-10-20

## 2021-10-20 ENCOUNTER — MEDICAL CORRESPONDENCE (OUTPATIENT)
Dept: TRANSPLANT | Facility: CLINIC | Age: 51
End: 2021-10-20

## 2021-10-20 ENCOUNTER — OFFICE VISIT (OUTPATIENT)
Dept: TRANSPLANT | Facility: CLINIC | Age: 51
End: 2021-10-20
Attending: INTERNAL MEDICINE
Payer: COMMERCIAL

## 2021-10-20 VITALS
DIASTOLIC BLOOD PRESSURE: 93 MMHG | HEART RATE: 84 BPM | BODY MASS INDEX: 25.93 KG/M2 | RESPIRATION RATE: 16 BRPM | TEMPERATURE: 98 F | WEIGHT: 160.6 LBS | SYSTOLIC BLOOD PRESSURE: 135 MMHG | OXYGEN SATURATION: 100 %

## 2021-10-20 DIAGNOSIS — C90.00 MULTIPLE MYELOMA, REMISSION STATUS UNSPECIFIED (H): ICD-10-CM

## 2021-10-20 DIAGNOSIS — C90.00 MULTIPLE MYELOMA NOT HAVING ACHIEVED REMISSION (H): ICD-10-CM

## 2021-10-20 DIAGNOSIS — E06.3 HYPOTHYROIDISM DUE TO HASHIMOTO'S THYROIDITIS: Primary | ICD-10-CM

## 2021-10-20 DIAGNOSIS — Z86.2 PERSONAL HISTORY OF DISEASES OF BLOOD AND BLOOD-FORMING ORGANS: ICD-10-CM

## 2021-10-20 DIAGNOSIS — E06.3 HYPOTHYROIDISM DUE TO HASHIMOTO'S THYROIDITIS: ICD-10-CM

## 2021-10-20 DIAGNOSIS — Z79.899 ENCOUNTER FOR LONG-TERM (CURRENT) USE OF MEDICATIONS: ICD-10-CM

## 2021-10-20 LAB
ALBUMIN SERPL-MCNC: 4.2 G/DL (ref 3.4–5)
ALP SERPL-CCNC: 64 U/L (ref 40–150)
ALT SERPL W P-5'-P-CCNC: 17 U/L (ref 0–70)
ANION GAP SERPL CALCULATED.3IONS-SCNC: 8 MMOL/L (ref 3–14)
AST SERPL W P-5'-P-CCNC: 18 U/L (ref 0–45)
BASOPHILS # BLD AUTO: 0 10E3/UL (ref 0–0.2)
BASOPHILS NFR BLD AUTO: 0 %
BILIRUB SERPL-MCNC: 0.5 MG/DL (ref 0.2–1.3)
BUN SERPL-MCNC: 15 MG/DL (ref 7–30)
CALCIUM SERPL-MCNC: 8.8 MG/DL (ref 8.5–10.1)
CHLORIDE BLD-SCNC: 107 MMOL/L (ref 94–109)
CO2 SERPL-SCNC: 24 MMOL/L (ref 20–32)
CREAT SERPL-MCNC: 0.96 MG/DL (ref 0.66–1.25)
CREAT SERPL-MCNC: 0.96 MG/DL (ref 0.66–1.25)
EOSINOPHIL # BLD AUTO: 0.4 10E3/UL (ref 0–0.7)
EOSINOPHIL NFR BLD AUTO: 6 %
ERYTHROCYTE [DISTWIDTH] IN BLOOD BY AUTOMATED COUNT: 14.6 % (ref 10–15)
GFR SERPL CREATININE-BSD FRML MDRD: 90 ML/MIN/1.73M2
GLUCOSE BLD-MCNC: 119 MG/DL (ref 70–99)
HCT VFR BLD AUTO: 38.7 % (ref 40–53)
HGB BLD-MCNC: 13 G/DL (ref 13.3–17.7)
IMM GRANULOCYTES # BLD: 0 10E3/UL
IMM GRANULOCYTES NFR BLD: 0 %
LYMPHOCYTES # BLD AUTO: 2.1 10E3/UL (ref 0.8–5.3)
LYMPHOCYTES NFR BLD AUTO: 32 %
MCH RBC QN AUTO: 30.4 PG (ref 26.5–33)
MCHC RBC AUTO-ENTMCNC: 33.6 G/DL (ref 31.5–36.5)
MCV RBC AUTO: 91 FL (ref 78–100)
MONOCYTES # BLD AUTO: 0.8 10E3/UL (ref 0–1.3)
MONOCYTES NFR BLD AUTO: 13 %
NEUTROPHILS # BLD AUTO: 3.3 10E3/UL (ref 1.6–8.3)
NEUTROPHILS NFR BLD AUTO: 49 %
NRBC # BLD AUTO: 0 10E3/UL
NRBC BLD AUTO-RTO: 0 /100
PLATELET # BLD AUTO: 456 10E3/UL (ref 150–450)
POTASSIUM BLD-SCNC: 3.3 MMOL/L (ref 3.4–5.3)
PROT SERPL-MCNC: 7 G/DL (ref 6.8–8.8)
RBC # BLD AUTO: 4.27 10E6/UL (ref 4.4–5.9)
SODIUM SERPL-SCNC: 139 MMOL/L (ref 133–144)
T3FREE SERPL-MCNC: 3.5 PG/ML (ref 2.3–4.2)
T4 FREE SERPL-MCNC: 0.97 NG/DL (ref 0.76–1.46)
TSH SERPL DL<=0.005 MIU/L-ACNC: 0.7 MU/L (ref 0.4–4)
WBC # BLD AUTO: 6.7 10E3/UL (ref 4–11)

## 2021-10-20 PROCEDURE — 84481 FREE ASSAY (FT-3): CPT

## 2021-10-20 PROCEDURE — 84439 ASSAY OF FREE THYROXINE: CPT

## 2021-10-20 PROCEDURE — 80053 COMPREHEN METABOLIC PANEL: CPT

## 2021-10-20 PROCEDURE — 99214 OFFICE O/P EST MOD 30 MIN: CPT

## 2021-10-20 PROCEDURE — 36415 COLL VENOUS BLD VENIPUNCTURE: CPT

## 2021-10-20 PROCEDURE — 84443 ASSAY THYROID STIM HORMONE: CPT

## 2021-10-20 PROCEDURE — 85025 COMPLETE CBC W/AUTO DIFF WBC: CPT

## 2021-10-20 RX ORDER — LEVOFLOXACIN 250 MG/1
250 TABLET, FILM COATED ORAL DAILY
Qty: 40 TABLET | Refills: 4 | Status: SHIPPED | OUTPATIENT
Start: 2021-10-20 | End: 2021-11-04

## 2021-10-20 ASSESSMENT — ENCOUNTER SYMPTOMS
GASTROINTESTINAL NEGATIVE: 1
EYES NEGATIVE: 1
RESPIRATORY NEGATIVE: 1
CARDIOVASCULAR NEGATIVE: 1
FEVER: 0
APPETITE CHANGE: 0
HEMATOLOGIC/LYMPHATIC NEGATIVE: 1
FATIGUE: 1
PSYCHIATRIC NEGATIVE: 1
CHILLS: 0
NEUROLOGICAL NEGATIVE: 1

## 2021-10-20 ASSESSMENT — PAIN SCALES - GENERAL: PAINLEVEL: MILD PAIN (3)

## 2021-10-20 ASSESSMENT — EJECTION FRACTION
LAST EJECTION FRACTION (EF) PRIOR TO CONDITIONING (%): 60
LAST EJECTION FRACTION (EF) PRIOR TO CONDITIONING (%): YES

## 2021-10-20 ASSESSMENT — ANXIETY QUESTIONNAIRES: GAD7 TOTAL SCORE: 0

## 2021-10-20 ASSESSMENT — PULMONARY FUNCTION TESTS: FEV1/FVC_PERCENT_PREDICTED: 84

## 2021-10-20 NOTE — LETTER
10/20/2021         RE: June Ronquillo  [address]  [facility] [address]        Dear Colleague,    Thank you for referring your patient, June Ronquillo, to the [facility] BLOOD AND MARROW TRANSPLANT PROGRAM [place]. Please see a copy of my visit note below.      Blood and Marrow Transplant Program      June Ronquillo is a 51 year old male with myeloma, here for formal review prior to HCT.    Oncologic Hx:   - 4/30/2021 M spike of 34.8 in urine.M spike in blood 0.2; IgG 702 with depressed IgA and IgM. Beta 2 microglobulin 2.7. Lambda  with K/L ratio of 0.01. WBC 11.1 with absolute eos of 1.0. hgb, plt, Cr (1.1), and albumin WNL.     -4/30/2021 CT abd/pelvis showed sclerotic marrow changes with numerous lytic appearing lesions throughout the imaged portions of the spine, pelvis and ribs.      -PET scan 5/11/2021 Numerous osteolytic lesions which predominantly demonstrate uptake below liver background levels. There is one intraosseous lesion in the left lateral 9th rib that demonstrates uptake above background, possibly due to nondisplaced fracture. Adjacent to this lesion is mild  hypermetabolism to the left pleura, with new small left pleural effusion, suspicious for malignant effusion versus posttraumatic effusion. Pleural uptake may represent extramedullary myeloma extending along the intercostal space versus inflammation.     - BM bx 5/17/2021 with flow showing 4.0% plasma cells which express CD19 (dim), CD38 (bright), CD45 (dim), , and monotypic cytoplasmic lambda immunoglobulin light chains but lack CD20 and CD56.  Hypercellular bone marrow for age (approximately 75% cellularity) with adequate trilineage hematopoiesis. Plasma cell infiltrate: Interstitial, lambda monotypic and representing approximately 60% the bone marrow cellularity, by  immunohistochemical stain. Cytpgenetics with 2 related hypodiploid clones. One with loss of Y, monosomy 13 and 14 (5%)  and one with translocation of 8p and 14, derivative 16 with long arm replaced by extra copy 1q,monosomy 21. FISH showed gain of 1q, loss of 13 and 14, IGH-MYC fusion t(8:14)     - Started Miracle-RVd 21 day with velcade on days 1,8,15.      -Cycle 2 Miracle-RVd. Dose reduce dex to 80 mg d/t fatigue and stomach upset on dex days. Also having cough with basilar streaking on CXR     - 8/31/2021 BM bx -rare suspicious plasma cells in touch imprint. No increase in plasma cells by morph.     -8/31/2021 PET/CT Diffuse osteolytic lesions throughout the axial and appendicular skeleton. Increased sclerosis since prior exam 5/11/2021 without increased metabolism or size.  Hypermetabolic pretracheal lymph node as well as hypermetabolic level 2 lymph nodes within the right neck. These lymph nodes are likely reactive from autoimmune activation via medical therapy. Hypermetabolic subcentimeter nodules within the thyroid gland    Oncology History   Multiple myeloma not having achieved remission (H)   5/13/2021 Initial Diagnosis    Multiple myeloma not having achieved remission (H)     5/17/2021 -  Chemotherapy    OP ONC Multiple Myeloma - Daratumumab Hyaluronidase (Darzalex Faspro) / Bortezomib / Dexamethasone (Age </= 76 yo AND BMI >/= 18.5)  Plan Provider: Barbi Vazquez MD  Treatment goal: Curative  Line of treatment: Induction     9/17/2021 -  Chemotherapy    OP ONC Zoledronic Acid (Zometa) MONTHLY  Plan Provider: Barbi Vazquez MD  Treatment goal: Palliative  Line of treatment: Maintenance         Interval History      He came to clinic today to make final preparation for the collection of peripheral blood stem cells for upcoming high-dose melphalan.  He states that he has been feeling quite good, better than he has for some time, and enjoying the time off of treatment.  He has been working, walking, eating and drinking well, and living a full life.  He has few adverse effects remaining from any treatment he received and  his only symptom is mild upper leg pain which she attributes to weakness and some discomfort following walking.  He has no infectious complications, is generally feeling well, has no recent exposures that he is aware of, and is happy about his quality of life.  He has been well educated regarding upcoming process and has few questions today      Family History:   Family History   Problem Relation Age of Onset     Hypertension Mother      Hyperlipidemia Mother      Heart Disease Paternal Grandmother      Hyperlipidemia Sister      Hyperlipidemia Sister        Social History:   Social History     Socioeconomic History     Marital status:      Spouse name: Not on file     Number of children: Not on file     Years of education: Not on file     Highest education level: Not on file   Occupational History     Not on file   Tobacco Use     Smoking status: Never Smoker     Smokeless tobacco: Never Used   Substance and Sexual Activity     Alcohol use: Not Currently     Drug use: No     Sexual activity: Yes     Partners: Female   Other Topics Concern     Parent/sibling w/ CABG, MI or angioplasty before 65F 55M? No      Service Not Asked     Blood Transfusions Not Asked     Caffeine Concern No     Occupational Exposure Not Asked     Hobby Hazards Not Asked     Sleep Concern Not Asked     Stress Concern Not Asked     Weight Concern Not Asked     Special Diet No     Back Care Not Asked     Exercise No     Bike Helmet Not Asked     Seat Belt Yes     Self-Exams Not Asked   Social History Narrative     Not on file     Social Determinants of Health     Financial Resource Strain:      Difficulty of Paying Living Expenses:    Food Insecurity:      Worried About Running Out of Food in the Last Year:      Ran Out of Food in the Last Year:    Transportation Needs:      Lack of Transportation (Medical):      Lack of Transportation (Non-Medical):    Physical Activity:      Days of Exercise per Week:      Minutes of Exercise  per Session:    Stress:      Feeling of Stress :    Social Connections:      Frequency of Communication with Friends and Family:      Frequency of Social Gatherings with Friends and Family:      Attends Rastafarian Services:      Active Member of Clubs or Organizations:      Attends Club or Organization Meetings:      Marital Status:    Intimate Partner Violence: Not At Risk     Fear of Current or Ex-Partner: No     Emotionally Abused: No     Physically Abused: No     Sexually Abused: No       Past Medical History:   Past Medical History:   Diagnosis Date     CAD (coronary artery disease)     s/p stent to CFX     Heart attack (H)      Hyperlipidemia LDL goal <100      Hypertension      Stented coronary artery      Thyroid disease         Past Surgical History:   Past Surgical History:   Procedure Laterality Date     BONE MARROW BIOPSY, BONE SPECIMEN, NEEDLE/TROCAR Left 2021    Procedure: BIOPSY, BONE MARROW with aspiration and ancillary studies;  Surgeon: Niharika Regalado MD;  Location: [facility] OR     BONE MARROW BIOPSY, BONE SPECIMEN, NEEDLE/TROCAR Left 10/14/2021    Procedure: BIOPSY, BONE MARROW;  Surgeon: Alexa Albert APRN CNP;  Location: [facility] [place] [place] OR     HEART CATH PTCA SINGLE VESSEL  10/10/2004    Stent to CFX - [facility]        Allergies:   Allergies   Allergen Reactions     Blood Transfusion Related (Informational Only) Other (See Comments)     Patient has a history of a clinically significant antibody against RBC antigens.  A delay in compatible RBCs may occur.      Nkda [No Known Drug Allergies]        KPS: 90  ECO    Home Medications      Prior to Admission medications    Medication Sig Start Date End Date Taking? Authorizing Provider   acyclovir (ZOVIRAX) 400 MG tablet Take 1 tablet (400 mg) by mouth 2 times daily 21   Barbi Vazquez MD   aspirin (ASA) 325 MG tablet Take 1 tablet (325 mg) by mouth daily  Patient not taking: Reported on 10/13/2021 5/19/21   Barbi Vazquez  MD Triny   calcium carb-cholecalciferol (OS-HARDEEP) 500-200 MG-UNIT tablet Take 1 tablet by mouth daily 9/8/21   Barbi Vazquez MD   dexamethasone (DECADRON) 4 MG tablet Take 20 mg (four tabs) by mouth on Day 2 9/20/21   Barbi Vazquez MD   FISH OIL 1000 MG OR CAPS 1 bid  Patient not taking: No sig reported    Reported, Patient   hydrochlorothiazide (HYDRODIURIL) 50 MG tablet Take 1 tablet (50 mg) by mouth daily 7/15/21   Costa Saha MD   LENalidomide (REVLIMID) 25 MG CAPS capsule Take 1 capsule (25 mg) by mouth daily for 14 days 9/20/21 10/4/21  Barbi Vazquez MD   levofloxacin (LEVAQUIN) 250 MG tablet Take 1 tablet (250 mg) by mouth daily 5/19/21   Barbi Vazquez MD   levothyroxine (SYNTHROID/LEVOTHROID) 50 MCG tablet Take 1 tablet (50 mcg) by mouth daily 8/4/21   Selma Johnson APRN CNP   losartan (COZAAR) 50 MG tablet Take 1 tablet (50 mg) by mouth daily 7/2/21   Ip, Dominguez Garcia MD   order for DME Rib belt 7/25/20   Werner Rivas, PAMarilinC   pantoprazole (PROTONIX) 20 MG EC tablet Take 1 tablet (20 mg) by mouth daily 6/30/21   Barbi Vazquez MD   potassium chloride ER (KLOR-CON M) 10 MEQ CR tablet Take 2 tablets (20 mEq) by mouth daily 10/12/21   Barbi Vazquez MD   prochlorperazine (COMPAZINE) 10 MG tablet Take 1 tablet (10 mg) by mouth every 6 hours as needed (Nausea/Vomiting) 5/17/21   Barbi Vazquez MD   rosuvastatin (CRESTOR) 40 MG tablet TAKE ONE TABLET BY MOUTH ONCE DAILY. MUST SCHEDULE APPOINTMENT FOR ANNUAL FOLLOW UP FOR FURTHER REFILLS 10/4/21   Selma Johnson APRN CNP       Review of Systems    Review of Systems   Constitutional: Positive for fatigue. Negative for appetite change, chills and fever.   HENT:  Negative.         Last dental visit one month ago   Eyes: Negative.    Respiratory: Negative.    Cardiovascular: Negative.    Gastrointestinal: Negative.    Genitourinary: Negative.     Musculoskeletal:        Mild upper leg pain in  his thighs   Skin: Negative.    Neurological: Negative.    Hematological: Negative.    Psychiatric/Behavioral: Negative.        PHYSICAL EXAM      Weight     Wt Readings from Last 3 Encounters:   10/20/21 72.8 kg (160 lb 9.6 oz)   10/18/21 72 kg (158 lb 11.7 oz)   10/18/21 73.5 kg (162 lb)          BP (!) 135/93   Pulse 84   Temp 98  F (36.7  C) (Oral)   Resp 16   Wt 72.8 kg (160 lb 9.6 oz)   SpO2 100%   BMI 25.93 kg/m       Physical Exam  Constitutional:       General: He is not in acute distress.     Appearance: Normal appearance. He is normal weight.   HENT:      Nose: Nose normal. No congestion.      Mouth/Throat:      Mouth: Mucous membranes are moist.      Pharynx: Oropharynx is clear. No oropharyngeal exudate.   Eyes:      Conjunctiva/sclera: Conjunctivae normal.   Cardiovascular:      Rate and Rhythm: Normal rate and regular rhythm.      Pulses: Normal pulses.      Heart sounds: Normal heart sounds. No murmur heard.     Pulmonary:      Effort: Pulmonary effort is normal.      Breath sounds: Normal breath sounds.   Abdominal:      General: Abdomen is flat. Bowel sounds are normal.      Palpations: Abdomen is soft.   Musculoskeletal:         General: No swelling. Normal range of motion.      Cervical back: Normal range of motion.   Lymphadenopathy:      Cervical: No cervical adenopathy.   Skin:     General: Skin is warm.   Neurological:      General: No focal deficit present.      Mental Status: He is alert and oriented to person, place, and time.   Psychiatric:         Mood and Affect: Mood normal.         Behavior: Behavior normal.        Relevant Results from Multiple Myeloma Workup:   CBC with platelets and differential   Result Value Ref Range    WBC Count 6.7 4.0 - 11.0 10e3/uL    RBC Count 4.27 (L) 4.40 - 5.90 10e6/uL    Hemoglobin 13.0 (L) 13.3 - 17.7 g/dL    Hematocrit 38.7 (L) 40.0 - 53.0 %    MCV 91 78 - 100 fL    MCH 30.4 26.5 - 33.0 pg    MCHC 33.6 31.5 - 36.5 g/dL    RDW 14.6 10.0 - 15.0 %     Platelet Count 456 (H) 150 - 450 10e3/uL    % Neutrophils 49 %    % Lymphocytes 32 %    % Monocytes 13 %    % Eosinophils 6 %    % Basophils 0 %    % Immature Granulocytes 0 %    NRBCs per 100 WBC 0 <1 /100    Absolute Neutrophils 3.3 1.6 - 8.3 10e3/uL    Absolute Lymphocytes 2.1 0.8 - 5.3 10e3/uL    Absolute Monocytes 0.8 0.0 - 1.3 10e3/uL    Absolute Eosinophils 0.4 0.0 - 0.7 10e3/uL    Absolute Basophils 0.0 0.0 - 0.2 10e3/uL    Absolute Immature Granulocytes 0.0 <=0.0 10e3/uL    Absolute NRBCs 0.0 10e3/uL   *CBC with platelets differential    Narrative    The following orders were created for panel order *CBC with platelets differential.  Procedure                               Abnormality         Status                     ---------                               -----------         ------                     CBC with platelets and d...[[id number]]  Abnormal            Final result                 Please view results for these tests on the individual orders.   Comprehensive metabolic panel   Result Value Ref Range    Sodium 139 133 - 144 mmol/L    Potassium 3.3 (L) 3.4 - 5.3 mmol/L    Chloride 107 94 - 109 mmol/L    Carbon Dioxide (CO2) 24 20 - 32 mmol/L    Anion Gap 8 3 - 14 mmol/L    Urea Nitrogen 15 7 - 30 mg/dL    Creatinine 0.96 0.66 - 1.25 mg/dL    Calcium 8.8 8.5 - 10.1 mg/dL    Glucose 119 (H) 70 - 99 mg/dL    Alkaline Phosphatase 64 40 - 150 U/L    AST 18 0 - 45 U/L    ALT 17 0 - 70 U/L    Protein Total 7.0 6.8 - 8.8 g/dL    Albumin 4.2 3.4 - 5.0 g/dL    Bilirubin Total 0.5 0.2 - 1.3 mg/dL    GFR Estimate 90 >60 mL/min/1.73m2   Relevant Results from Multiple Myeloma Workup:   XR Chest 2 Views    Narrative    Chest 2 views    INDICATION: Multiple myeloma    COMPARISON: PET/CT 8/31/2021 an chest radiographs 6/25/2021    FINDINGS: Heart size appears normal. Lungs and pulmonary vascularity  appear normal. Recent PET CT demonstrated multiple osteolytic lesions  throughout the body including lesions at T6  and T11 and T12, relative  lucency of the midportion of T12 vertebral body correlates reasonably  well with the PETCT finding from 8/31/2021 although this is inexact.    COMPARISON: Possible lucency demonstrated and T12 much better  visualized on PET/CT from 8/31/2021. Otherwise unremarkable.    CASSANDRA RUSSO MD         SYSTEM ID:  [id number]   Relevant Results from Multiple Myeloma Workup:   Creatinine Clearance, Urine   Result Value Ref Range    Height in cm 166 cm    Weight in kg 73.0 kg    Raw Clearance 95 mL/min    Standard Clearance 90 mL/min/1.7m2    Volume in mL 1,850 mL    Duration in hours 24.0 h    Creatinine 1.01 mg/dL    Creatinine Urine mg/dL 75 mg/dL    Creatinine Urine Timed g/spec 1.39 1.00 - 2.00 g/spec   Creatinine, serum   Result Value Ref Range    Creatinine 0.96 0.66 - 1.25 mg/dL   Protein immunofixation urine   Result Value Ref Range    Immunofixation ELP Urine       No monoclonal protein seen on immunofixation. Pathological significance requires clinical correlation. KAMALJIT Partida M.D., Ph.D  Pathologist ([phone])     Protein electrophoresis timed urine   Result Value Ref Range    Albumin Urine 100.0 (H) <=0.0 %    Monocloncal Peak Urine % 0.0 <=0.0 %    ELP Interpretation Urine       Only trace albumin seen. No obvious monoclonal protein seen and the absence of monoclonal immunoglobulins was confirmed by immunofixation of this same urine sample. Pathological significance requires clinical correlation.  KAMALJIT Partida M.D., Ph.D.,   Pathologist ()   Creatinine clearance    Narrative    The following orders were created for panel order Creatinine clearance.  Procedure                               Abnormality         Status                     ---------                               -----------         ------                     Creatinine Clearance, Urine[[id number]]                      Final result               Creatinine, serum[[id number]]            Normal               Final result                 Please view results for these tests on the individual orders.   Relevant Results from Multiple Myeloma Workup:   INR   Result Value Ref Range    INR 0.95 0.85 - 1.15   Relevant Results from Multiple Myeloma Workup:   Protein Electrophoresis, Serum   Result Value Ref Range    Albumin 4.7 3.7 - 5.1 g/dL    Alpha 1 0.3 0.2 - 0.4 g/dL    Alpha 2 0.8 0.5 - 0.9 g/dL    Beta Globulin 0.8 0.6 - 1.0 g/dL    Gamma Globulin 0.5 (L) 0.7 - 1.6 g/dL    Monoclonal Peak 0.1 (H) <=0.0 g/dL    ELP Interpretation       Very small monoclonal protein (0.1 g/dL) seen in the gamma fraction. See immunofixation report on same specimen. Hypogammaglobulinemia. Pathologic significance requires clinical correlation.  Rizwan Ace M.D., Ph.D., Pathologist.   Total Protein, Serum   Result Value Ref Range    Total Protein Serum for ELP 7.0 6.8 - 8.8 g/dL   Adult Type and Screen   Result Value Ref Range    ABO/RH(D) A POS     Antibody Screen Positive (A) Negative    SPECIMEN EXPIRATION DATE [id number]    HBV HCV HIV WNV by [facility]- [facility]   Result Value Ref Range    HEPATITIS B BY HILARIO Non-Reactive     HCV by HILARIO Non-Reactive     HIV By Hilario Non-Reactive     West Nile Virus By HILARIO Non-Reactive     Narrative    Verified by Agusto Iyer on 10/17/2021.   Panther Burn and lambda light chain   Result Value Ref Range    Kappa Free Light Chains 0.66 0.33 - 1.94 mg/dL    Lambda Free Light Chains 0.63 0.57 - 2.63 mg/dL    Kappa /Lambda Ratio 1.05 0.26 - 1.65   Partial thromboplastin time   Result Value Ref Range    aPTT 31 22 - 38 Seconds   Immunoglobulins A G and M   Result Value Ref Range    Immunoglobulin G 472 (L) 610-1,616 mg/dL    Immunoglobulin A 52 (L) 84 - 499 mg/dL    Immunoglobulin M 14 (L) 35 - 242 mg/dL   Protein electrophoresis    Narrative    The following orders were created for panel order Protein electrophoresis.  Procedure                               Abnormality         Status                      ---------                               -----------         ------                     Total Protein, Serum[[id number]]         Normal              Final result               Protein Electrophoresis,...[[id number]]  Abnormal            Final result                 Please view results for these tests on the individual orders.   Beta 2 microglobuline   Result Value Ref Range    Beta-2-Microglobulin 1.9 <2.3 mg/L   ABO/Rh type and screen    Narrative    The following orders were created for panel order ABO/Rh type and screen.  Procedure                               Abnormality         Status                     ---------                               -----------         ------                     Adult Type and Screen[[id number]]        Abnormal            Edited Result - FINAL        Please view results for these tests on the individual orders.   Relevant Results from Multiple Myeloma Workup:   Hepatitis B surface antigen   Result Value Ref Range    Hep B Surface Agn Nonreactive NR^Nonreactive   Hepatitis A antibody IgM   Result Value Ref Range    Hepatitis A IgM Susanne Nonreactive NR^Nonreactive       OVERALL ASSESSMENT AND PLAN     Multiple myeloma-he has high risk myeloma by virtue of the 1 q. abnormality and has been treated aggressively with daratumumab, Velcade, Revlimid, dexamethasone.  He has achieved a very good partial remission and has a bone marrow aspirate and biopsy that appear normal without involvement.  I identified no specific contraindications or problems to proceeding.    Notable findings identified in the Pre-HSCT workup:     Mild reduction in DLCO, and a history of CAD for his relevant comorbidities.  He does have a history of hypothyroidism and will see endocrinology next week.    Summary:  June Ronquillo is a 51 year old male with myeloma in a VGPR remission, which is currently incurable by standard chemotherapeutic approaches. Based upon my assessment of disease risk and  comorbidities, June is a suitable candidate for autologous PBSCT for the purpose of extending overall survival.    We had a detailed discussion about the rationale for transplant in this situation, requirements for proceeding, which include insurance approval, identification of an adequate source of autologous hematopoetic progenitor cells, availability of a full-time caregiver along with residence within 30 minutes of the [facility] through at least day +28 post transplant, and compliance with the monitoring and supportive care plan prescribed by providers [facility] the [facility]      We had a jorge discussion about the risks of the transplant itself, including toxicities from the preparative regimen, severe infections, the need for red blood cell and platelet transfusion support, the potential for severe organ toxicity including lungs, liver, skin, and kidneys, the possibility of long-term disability, and the risk of death due to complications of the transplant.  We discussed the risk of disease relapse despite going forward with a transplant.  June expressed understanding of these risks.    BMT and Cell Therapy Informed Consent Discussion  In today's visit, we discussed in detail the research for which June Ronquillo is eligible. We discussed the potential risks and potential benefits of each protocol individually. We explained potential alternatives to the protocols discussed. We explained to the patient that participation is voluntary and that consent may be withdrawn at any time.     The patient completed the last round of treatment on October 1.    HCT-CI score: 3 (2 for DLCO, 1 for CAD). We counseled the patient about the impact of this on the risk of treatment related and overall mortality. The score fit within treatment protocol eligibility criteria.    Karnofsky performance score: 90     ECOG (required for CAR-T, see KPS to ECOG conversion chart): 0  Lansky play score (pedatrics): NA    Active  infections:  none.  Prior infections that require additional special prophylaxis considerations: none.  I reviewed and discussed infectious disease evaluation with the patient and the management plan during treatment.    Reproductive status: What methods of birth control does the patient plan to use during the treatment period beginning with conditioning and ending with the discontinuation of immune suppression (indicate with an X all that apply):  __ The patient is confirmed to be sterile or post-menopausal  __ Sexual abstinence  __ Condoms  __ Implants  __ Injectables  __ Oral contraceptives  __ Intrauterine devices (IUD)  __ Other (describe)    The patient received appropriate reproductive counseling and agreed with the need for effective contraception during the treatment procedures.      Dental health suitable to proceed: Yes    Transplants for multiple myeloma:  The patient has High risk MM (defined as ONE or more of the following: del17p, p53 mutation, t(4;14), t(14;16), t(14;20), abnormal chromosome 1, -13 [by metaphase testing], or plasma cell leukemia), and the collection goal is 5 million CD34/kg for a single transplant..  The day for admission to begin melphalan conditioning is Day -1 for melphalan 200 mg/m2 (not frail, creatinine clearance 30+). .    After our detailed discussion above, the patient signed the following consents for treatment and protocols:    Standard of care HD-[place]       The patient will receive a signed copy of the consents. The patient had opportunity to ask questions that were answered to the best of my ability and to the patient's apparent satisfaction.    June Ronquillo had all questions answered and is ready to proceed as discussed in detail today.    Total time: 70, with greater than 50% of the time spend in direct counseling    ITALIA GARNER MD  October 20, 2021      Patient Care Team:  Selma Johnson APRN CNP as PCP - General (Nurse Practitioner -  Family)  Jose Ronquillo MD as MD (Dermatology)  Dominguez Craig MD as Assigned Heart and Vascular Provider  Selma Johnson APRN CNP as Assigned PCP  Barbi Vazquez MD as MD (Hematology & Oncology)  Sho Mckeon, RN as Specialty Care Coordinator (Hematology & Oncology)  Lazaro Rayo MD as MD (Dermapathology)  Barbi Vazquez MD as Assigned Cancer Care Provider  Makeda Elder MD as MD (Endocrinology, Diabetes, and Metabolism)  Tato Dominique DO as Assigned Musculoskeletal Provider  Christine Reynolds RN as BMT Nurse Coordinator  Nasim Means MD as BMT Physician (Transplant)  Christine Reynolds RN as BMT Nurse Coordinator  Ema Antunez LICSW as  (BMT - Adult)        Again, thank you for allowing me to participate in the care of your patient.        Sincerely,        BMT DOM

## 2021-10-20 NOTE — NURSING NOTE
"Oncology Rooming Note    October 20, 2021 12:13 PM   June Ronquillo is a 51 year old male who presents for:    Chief Complaint   Patient presents with     Labs Only     labs drawn via , vitals completed     RECHECK     Pt  is here for a rtn for [facility] for MM     Initial Vitals: Blood Pressure (Abnormal) 135/93   Pulse 84   Temperature 98  F (36.7  C) (Oral)   Respiration 16   Weight 72.8 kg (160 lb 9.6 oz)   Oxygen Saturation 100%   Body Mass Index 25.93 kg/m   Estimated body mass index is 25.93 kg/m  as calculated from the following:    Height as of 10/18/21: 1.676 m (5' 5.98\").    Weight as of this encounter: 72.8 kg (160 lb 9.6 oz). Body surface area is 1.84 meters squared.  Mild Pain (3) Comment: Data Unavailable   No LMP for male patient.  Allergies reviewed: Yes  Medications reviewed: Yes    Medications: Medication refills not needed today.  Pharmacy name entered into [employer]:    [facility] [place], [place] - [phone] [facility] [place], [place] - [address] [place] [address]    Clinical concerns: none       Liana Obrien MA            "

## 2021-10-22 DIAGNOSIS — C90.00 MULTIPLE MYELOMA NOT HAVING ACHIEVED REMISSION (H): Primary | ICD-10-CM

## 2021-10-22 LAB — HOLD SPECIMEN: NORMAL

## 2021-10-22 RX ORDER — HEPARIN SODIUM,PORCINE 10 UNIT/ML
5 VIAL (ML) INTRAVENOUS
Status: CANCELLED | OUTPATIENT
Start: 2021-10-29

## 2021-10-22 RX ORDER — HEPARIN SODIUM (PORCINE) LOCK FLUSH IV SOLN 100 UNIT/ML 100 UNIT/ML
5 SOLUTION INTRAVENOUS
Status: CANCELLED | OUTPATIENT
Start: 2021-10-29

## 2021-10-23 ENCOUNTER — HOME INFUSION (PRE-WILLOW HOME INFUSION) (OUTPATIENT)
Dept: PHARMACY | Facility: CLINIC | Age: 51
End: 2021-10-23
Payer: COMMERCIAL

## 2021-10-25 ENCOUNTER — TELEPHONE (OUTPATIENT)
Dept: TRANSPLANT | Facility: CLINIC | Age: 51
End: 2021-10-25

## 2021-10-25 ENCOUNTER — VIRTUAL VISIT (OUTPATIENT)
Dept: ENDOCRINOLOGY | Facility: CLINIC | Age: 51
End: 2021-10-25
Attending: INTERNAL MEDICINE
Payer: COMMERCIAL

## 2021-10-25 DIAGNOSIS — C90.00 MULTIPLE MYELOMA, REMISSION STATUS UNSPECIFIED (H): ICD-10-CM

## 2021-10-25 DIAGNOSIS — E06.3 HYPOTHYROIDISM DUE TO HASHIMOTO'S THYROIDITIS: ICD-10-CM

## 2021-10-25 DIAGNOSIS — R93.89 ABNORMAL IMAGING OF THYROID: Primary | ICD-10-CM

## 2021-10-25 DIAGNOSIS — Z11.59 ENCOUNTER FOR SCREENING FOR OTHER VIRAL DISEASES: ICD-10-CM

## 2021-10-25 DIAGNOSIS — E04.1 THYROID NODULE: ICD-10-CM

## 2021-10-25 DIAGNOSIS — C90.00 MULTIPLE MYELOMA NOT HAVING ACHIEVED REMISSION (H): ICD-10-CM

## 2021-10-25 DIAGNOSIS — R73.03 PRE-DIABETES: ICD-10-CM

## 2021-10-25 PROCEDURE — 99205 OFFICE O/P NEW HI 60 MIN: CPT | Mod: 95 | Performed by: INTERNAL MEDICINE

## 2021-10-25 NOTE — PROGRESS NOTES
June is a 51 year old who is being evaluated via a billable video visit.      How would you like to obtain your AVS? MyChart  If the video visit is dropped, the invitation should be resent by: Text to cell phone: [phone]  Will anyone else be joining your video visit? Ashley Mcleod, EMT        ENDOCRINOLOGY VIDEO VISIT NEW        HISTORY OF PRESENT ILLNESS    June Ronquillo is a 51 year old male who is being evaluated via a billable video visit. The patient is seen in consultation at the request of Dr. Wang for abnormal appearance of the thyroid/abnormal uptake on PET CT.    The patient has history of hypothyroidism diagnosed in 1/2021, attributed to Hashimoto's thyroiditis.  He was initiated on levothyroxine 25 mcg daily and the dose was increased to 50 mcg daily on 5/3/2021.  He has been taking levothyroxine first thing in the morning with water and has been waiting 60 to 90 minutes before taking his other medications.  Although he did not have overt hypothyroid symptoms at the diagnosis of hypothyroidism, he has noted less sluggishness on levothyroxine.    The patient was diagnosed with multiple myeloma in 4/2021.  He was initiated on treatment with daratumumab, Velcade, Revlimid and dexamethasone.  He is now anticipating autologous PBSCT.  During imaging for evaluation of multiple myeloma, he was incidentally discovered to have increased FDG uptake in thyroid nodules in 8/2021 and in 10/2021, with findings as below:  - PET/CT 5/11/2021: Thyroid gland appeared normal  - PET/CT 8/31/2021: Hypermetabolic cervical lymph nodes, 3 separate hypermetabolic foci in the thyroid gland (2 in the left lobe, 1 in the right lobe).  CT appearance of nodules was hypoattenuating with a bulky calcification in one of the left thyroid lobe nodules.  SUV max of thyroid lesions 4.95.  - PET/CT 10/15/2021: Previous mildly enlarged FDG avid right upper cervical lymph nodes were resolved, likely inflammatory.  Few  tiny thyroid nodules, similar in size, with decreased (now at most mild) uptake (SUV max 2.5).    The patient has not noted a change in the field of his neck.  He has no neck discomfort.  He had transient hoarseness while receiving dexamethasone, otherwise no consistent hoarseness.  No dysphagia.    He does not have history of excessive head or neck radiation exposure.  No family history of thyroid disease/thyroid cancer.    He has experienced fatigue during his chemotherapy regimen and fatigue has improved over the past 3 weeks as he has gotten further out from chemotherapy.  No change in temperature tolerance.  Bowel movements are regular.    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 MEDICAL HISTORY  Past Medical History:   Diagnosis Date     CAD (coronary artery disease)     s/p stent to CFX     Heart attack (H)      Hyperlipidemia LDL goal <100      Hypertension      Stented coronary artery      Thyroid disease        MEDICATIONS  Current Outpatient Medications   Medication Sig Dispense Refill     acyclovir (ZOVIRAX) 400 MG tablet Take 1 tablet (400 mg) by mouth 2 times daily 60 tablet 6     aspirin (ASA) 325 MG tablet Take 1 tablet (325 mg) by mouth daily 30 tablet 4     calcium carb-cholecalciferol (OS-HARDEEP) 500-200 MG-UNIT tablet Take 1 tablet by mouth daily 30 tablet 11     FISH OIL 1000 MG OR CAPS        hydrochlorothiazide (HYDRODIURIL) 50 MG tablet Take 1 tablet (50 mg) by mouth daily 30 tablet 3     levothyroxine (SYNTHROID/LEVOTHROID) 50 MCG tablet Take 1 tablet (50 mcg) by mouth daily 90 tablet 1     losartan (COZAAR) 50 MG tablet Take 1 tablet (50 mg) by mouth daily 90 tablet 2     order for DME Rib belt 1 Device 0     pantoprazole (PROTONIX) 20 MG EC tablet Take 1 tablet (20 mg) by mouth daily 30 tablet 4     potassium chloride ER (KLOR-CON M) 10 MEQ CR tablet Take 2 tablets (20 mEq) by mouth daily 30 tablet 3     prochlorperazine  (COMPAZINE) 10 MG tablet Take 1 tablet (10 mg) by mouth every 6 hours as needed (Nausea/Vomiting) 30 tablet 5     rosuvastatin (CRESTOR) 40 MG tablet TAKE ONE TABLET BY MOUTH ONCE DAILY. MUST SCHEDULE APPOINTMENT FOR ANNUAL FOLLOW UP FOR FURTHER REFILLS 90 tablet 1     dexamethasone (DECADRON) 4 MG tablet Take 20 mg (four tabs) by mouth on Day 2 (Patient not taking: Reported on 10/25/2021) 15 tablet 0     LENalidomide (REVLIMID) 25 MG CAPS capsule Take 1 capsule (25 mg) by mouth daily for 14 days 14 capsule 0     levofloxacin (LEVAQUIN) 250 MG tablet Take 1 tablet (250 mg) by mouth daily (Patient not taking: Reported on 10/25/2021) 40 tablet 4       Allergies, family, and social history were reviewed and documented as needed in EHR.     REVIEW OF SYSTEMS  A focused ROS was performed, with pertinent positives and negatives as noted in the HPI    PHYSICAL EXAM  There were no vitals taken for this visit.  There is no height or weight on file to calculate BMI.  Constitutional: Patient is alert, oriented and appears in no acute distress.  Eyes: Eyes grossly normal to inspection, EOMI, no stare, lid lag, or retraction; no conjunctival injection.  ENMT: Lips are without lesions.   Respiratory: No audible wheeze or cough. No visible cyanosis. No visible increased work of breathing.  Neurological: Alert and oriented times 3.  Cranial nerves grossly intact.      DATA REVIEW  Each of the following laboratory and/or imaging studies were reviewed.  Also, PET/CT results were reviewed, as outlined in HPI.    Office Visit on 10/20/2021   Component Date Value Ref Range Status     T3 Free 10/20/2021 3.5  2.3 - 4.2 pg/mL Final     Free T4 10/20/2021 0.97  0.76 - 1.46 ng/dL Final     Sodium 10/20/2021 139  133 - 144 mmol/L Final     Potassium 10/20/2021 3.3* 3.4 - 5.3 mmol/L Final     Chloride 10/20/2021 107  94 - 109 mmol/L Final     Carbon Dioxide (CO2) 10/20/2021 24  20 - 32 mmol/L Final     Anion Gap 10/20/2021 8  3 - 14 mmol/L  Final     Urea Nitrogen 10/20/2021 15  7 - 30 mg/dL Final     Creatinine 10/20/2021 0.96  0.66 - 1.25 mg/dL Final     Calcium 10/20/2021 8.8  8.5 - 10.1 mg/dL Final     Glucose 10/20/2021 119* 70 - 99 mg/dL Final     Alkaline Phosphatase 10/20/2021 64  40 - 150 U/L Final     AST 10/20/2021 18  0 - 45 U/L Final     ALT 10/20/2021 17  0 - 70 U/L Final     Protein Total 10/20/2021 7.0  6.8 - 8.8 g/dL Final     Albumin 10/20/2021 4.2  3.4 - 5.0 g/dL Final     Bilirubin Total 10/20/2021 0.5  0.2 - 1.3 mg/dL Final     GFR Estimate 10/20/2021 90  >60 mL/min/1.73m2 Final    As of July 11, 2021, eGFR is calculated by the CKD-EPI creatinine equation, without race adjustment. eGFR can be influenced by muscle mass, exercise, and diet. The reported eGFR is an estimation only and is only applicable if the renal function is stable.     WBC Count 10/20/2021 6.7  4.0 - 11.0 10e3/uL Final     RBC Count 10/20/2021 4.27* 4.40 - 5.90 10e6/uL Final     Hemoglobin 10/20/2021 13.0* 13.3 - 17.7 g/dL Final     Hematocrit 10/20/2021 38.7* 40.0 - 53.0 % Final     MCV 10/20/2021 91  78 - 100 fL Final     MCH 10/20/2021 30.4  26.5 - 33.0 pg Final     MCHC 10/20/2021 33.6  31.5 - 36.5 g/dL Final     RDW 10/20/2021 14.6  10.0 - 15.0 % Final     Platelet Count 10/20/2021 456* 150 - 450 10e3/uL Final     % Neutrophils 10/20/2021 49  % Final     % Lymphocytes 10/20/2021 32  % Final     % Monocytes 10/20/2021 13  % Final     % Eosinophils 10/20/2021 6  % Final     % Basophils 10/20/2021 0  % Final     % Immature Granulocytes 10/20/2021 0  % Final     NRBCs per 100 WBC 10/20/2021 0  <1 /100 Final     Absolute Neutrophils 10/20/2021 3.3  1.6 - 8.3 10e3/uL Final     Absolute Lymphocytes 10/20/2021 2.1  0.8 - 5.3 10e3/uL Final     Absolute Monocytes 10/20/2021 0.8  0.0 - 1.3 10e3/uL Final     Absolute Eosinophils 10/20/2021 0.4  0.0 - 0.7 10e3/uL Final     Absolute Basophils 10/20/2021 0.0  0.0 - 0.2 10e3/uL Final     Absolute Immature Granulocytes  10/20/2021 0.0  <=0.0 10e3/uL Final     Absolute NRBCs 10/20/2021 0.0  10e3/uL Final     TSH 10/20/2021 0.70  0.40 - 4.00 mU/L Final         ASSESSMENT  1.  Thyroid nodules.  Incidentally noted on PET/CT, hypermetabolic with increased FDG uptake, although not markedly high SUV.  Increased FDG uptake increases the risk of malignancy, although Hashimoto's thyroiditis (which the patient had likely has, as discussed below) also increases FDG avidity of the thyroid gland.  He does not have high risk history for thyroid cancer.  Would recommend thyroid ultrasound to better characterize thyroid nodules.  Based on these results, we can better determine whether to carefully observe or if additional intervention is warranted.  We reviewed low risk of malignancy and thyroid nodules, relative increase in risk of malignancy with increased FDG uptake, as well as proposed testing.  Patient is in agreement with our plan.    2.  Hypothyroidism.  Diagnosed in 1/2021.  Likely due to Hashimoto's thyroiditis, had positive antithyroglobulin antibody.  Clinically, appears euthyroid.  Additionally, thyroid function tests checked earlier this month were optimal.  Would continue current low dose of levothyroxine: We did review ways to take levothyroxine to ensure consistent absorption.    3.  Multiple myeloma.  Following in oncology.  Status post chemotherapy, anticipating autologous PBSCT.    4.  High risk for diabetes.  Hemoglobin A1c 6% on 1/25/2021.  Recommend careful monitoring, especially as patient's glucocorticoid exposure increases over the coming months during oncologic therapy.    PLAN  -Schedule thyroid ultrasound at earliest convenience  -Continue current dose of levothyroxine, however separate from calcium supplement by at least 4 hours  -We will determine when we need to follow-up based on ultrasound results  -We will communicate results via [employer]!, or if needed by phone      Orders Placed This Encounter   Procedures     US  Thyroid         Video-Visit Details    Type of service:  Video Visit    Video Start Time: 10:03 AM  Video End Time: 10:20 AM    I spent a total of 68 minutes on the date of encounter reviewing medical records, evaluating the patient, coordinating care and documenting in the EHR, as detailed above.      Platform used for Video Visit: Adán Elder MD   Division of Diabetes, Endocrinology and Metabolism  Department of Medicine      Cc:Kyle Wang MD; Selma Johnson CNP

## 2021-10-25 NOTE — TELEPHONE ENCOUNTER
Writer called patient to inform of upcoming (beginning 10/29/21) G-CSF schedule, line placement and stem cell collections.  Patient was provided pre-op instructions for line placement as per recommendations written via IR consult.  Patient declined email of schedule, will view in [employer]. Patient verbalized understanding of instructions via teach-back and no further questions at this time.

## 2021-10-25 NOTE — PATIENT INSTRUCTIONS
-Schedule thyroid ultrasound at earliest convenience  -Continue current dose of levothyroxine, however separate from calcium supplement by at least 4 hours  -We will determine when we need to follow-up based on ultrasound results  -We will communicate results via [employer], or if needed by phone

## 2021-10-25 NOTE — LETTER
10/25/2021       RE: June Ronquillo  [address]  [facility] [address]     Dear Colleague,    Thank you for referring your patient, June Ronquillo, to the [facility] [place] [facility]. Please see a copy of my visit note below.    June is a 51 year old who is being evaluated via a billable video visit.      How would you like to obtain your AVS? MyChart  If the video visit is dropped, the invitation should be resent by: Text to cell phone: [phone]  Will anyone else be joining your video visit? Ashley Mcleod, EMT        ENDOCRINOLOGY VIDEO VISIT NEW        HISTORY OF PRESENT ILLNESS    June Ronquillo is a 51 year old male who is being evaluated via a billable video visit. The patient is seen in consultation at the request of Dr. Wang for abnormal appearance of the thyroid/abnormal uptake on PET CT.    The patient has history of hypothyroidism diagnosed in 1/2021, attributed to Hashimoto's thyroiditis.  He was initiated on levothyroxine 25 mcg daily and the dose was increased to 50 mcg daily on 5/3/2021.  He has been taking levothyroxine first thing in the morning with water and has been waiting 60 to 90 minutes before taking his other medications.  Although he did not have overt hypothyroid symptoms at the diagnosis of hypothyroidism, he has noted less sluggishness on levothyroxine.    The patient was diagnosed with multiple myeloma in 4/2021.  He was initiated on treatment with daratumumab, Velcade, Revlimid and dexamethasone.  He is now anticipating autologous PBSCT.  During imaging for evaluation of multiple myeloma, he was incidentally discovered to have increased FDG uptake in thyroid nodules in 8/2021 and in 10/2021, with findings as below:  - PET/CT 5/11/2021: Thyroid gland appeared normal  - PET/CT 8/31/2021: Hypermetabolic cervical lymph nodes, 3 separate hypermetabolic foci in the  thyroid gland (2 in the left lobe, 1 in the right lobe).  CT appearance of nodules was hypoattenuating with a bulky calcification in one of the left thyroid lobe nodules.  SUV max of thyroid lesions 4.95.  - PET/CT 10/15/2021: Previous mildly enlarged FDG avid right upper cervical lymph nodes were resolved, likely inflammatory.  Few tiny thyroid nodules, similar in size, with decreased (now at most mild) uptake (SUV max 2.5).    The patient has not noted a change in the field of his neck.  He has no neck discomfort.  He had transient hoarseness while receiving dexamethasone, otherwise no consistent hoarseness.  No dysphagia.    He does not have history of excessive head or neck radiation exposure.  No family history of thyroid disease/thyroid cancer.    He has experienced fatigue during his chemotherapy regimen and fatigue has improved over the past 3 weeks as he has gotten further out from chemotherapy.  No change in temperature tolerance.  Bowel movements are regular.    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 MEDICAL HISTORY  Past Medical History:   Diagnosis Date     CAD (coronary artery disease)     s/p stent to CFX     Heart attack (H)      Hyperlipidemia LDL goal <100      Hypertension      Stented coronary artery      Thyroid disease        MEDICATIONS  Current Outpatient Medications   Medication Sig Dispense Refill     acyclovir (ZOVIRAX) 400 MG tablet Take 1 tablet (400 mg) by mouth 2 times daily 60 tablet 6     aspirin (ASA) 325 MG tablet Take 1 tablet (325 mg) by mouth daily 30 tablet 4     calcium carb-cholecalciferol (OS-HARDEEP) 500-200 MG-UNIT tablet Take 1 tablet by mouth daily 30 tablet 11     FISH OIL 1000 MG OR CAPS        hydrochlorothiazide (HYDRODIURIL) 50 MG tablet Take 1 tablet (50 mg) by mouth daily 30 tablet 3     levothyroxine (SYNTHROID/LEVOTHROID) 50 MCG tablet Take 1 tablet (50 mcg) by mouth daily 90 tablet 1      losartan (COZAAR) 50 MG tablet Take 1 tablet (50 mg) by mouth daily 90 tablet 2     order for DME Rib belt 1 Device 0     pantoprazole (PROTONIX) 20 MG EC tablet Take 1 tablet (20 mg) by mouth daily 30 tablet 4     potassium chloride ER (KLOR-CON M) 10 MEQ CR tablet Take 2 tablets (20 mEq) by mouth daily 30 tablet 3     prochlorperazine (COMPAZINE) 10 MG tablet Take 1 tablet (10 mg) by mouth every 6 hours as needed (Nausea/Vomiting) 30 tablet 5     rosuvastatin (CRESTOR) 40 MG tablet TAKE ONE TABLET BY MOUTH ONCE DAILY. MUST SCHEDULE APPOINTMENT FOR ANNUAL FOLLOW UP FOR FURTHER REFILLS 90 tablet 1     dexamethasone (DECADRON) 4 MG tablet Take 20 mg (four tabs) by mouth on Day 2 (Patient not taking: Reported on 10/25/2021) 15 tablet 0     LENalidomide (REVLIMID) 25 MG CAPS capsule Take 1 capsule (25 mg) by mouth daily for 14 days 14 capsule 0     levofloxacin (LEVAQUIN) 250 MG tablet Take 1 tablet (250 mg) by mouth daily (Patient not taking: Reported on 10/25/2021) 40 tablet 4       Allergies, family, and social history were reviewed and documented as needed in EHR.     REVIEW OF SYSTEMS  A focused ROS was performed, with pertinent positives and negatives as noted in the HPI    PHYSICAL EXAM  There were no vitals taken for this visit.  There is no height or weight on file to calculate BMI.  Constitutional: Patient is alert, oriented and appears in no acute distress.  Eyes: Eyes grossly normal to inspection, EOMI, no stare, lid lag, or retraction; no conjunctival injection.  ENMT: Lips are without lesions.   Respiratory: No audible wheeze or cough. No visible cyanosis. No visible increased work of breathing.  Neurological: Alert and oriented times 3.  Cranial nerves grossly intact.      DATA REVIEW  Each of the following laboratory and/or imaging studies were reviewed.  Also, PET/CT results were reviewed, as outlined in HPI.    Office Visit on 10/20/2021   Component Date Value Ref Range Status     T3 Free 10/20/2021  3.5  2.3 - 4.2 pg/mL Final     Free T4 10/20/2021 0.97  0.76 - 1.46 ng/dL Final     Sodium 10/20/2021 139  133 - 144 mmol/L Final     Potassium 10/20/2021 3.3* 3.4 - 5.3 mmol/L Final     Chloride 10/20/2021 107  94 - 109 mmol/L Final     Carbon Dioxide (CO2) 10/20/2021 24  20 - 32 mmol/L Final     Anion Gap 10/20/2021 8  3 - 14 mmol/L Final     Urea Nitrogen 10/20/2021 15  7 - 30 mg/dL Final     Creatinine 10/20/2021 0.96  0.66 - 1.25 mg/dL Final     Calcium 10/20/2021 8.8  8.5 - 10.1 mg/dL Final     Glucose 10/20/2021 119* 70 - 99 mg/dL Final     Alkaline Phosphatase 10/20/2021 64  40 - 150 U/L Final     AST 10/20/2021 18  0 - 45 U/L Final     ALT 10/20/2021 17  0 - 70 U/L Final     Protein Total 10/20/2021 7.0  6.8 - 8.8 g/dL Final     Albumin 10/20/2021 4.2  3.4 - 5.0 g/dL Final     Bilirubin Total 10/20/2021 0.5  0.2 - 1.3 mg/dL Final     GFR Estimate 10/20/2021 90  >60 mL/min/1.73m2 Final    As of July 11, 2021, eGFR is calculated by the CKD-EPI creatinine equation, without race adjustment. eGFR can be influenced by muscle mass, exercise, and diet. The reported eGFR is an estimation only and is only applicable if the renal function is stable.     WBC Count 10/20/2021 6.7  4.0 - 11.0 10e3/uL Final     RBC Count 10/20/2021 4.27* 4.40 - 5.90 10e6/uL Final     Hemoglobin 10/20/2021 13.0* 13.3 - 17.7 g/dL Final     Hematocrit 10/20/2021 38.7* 40.0 - 53.0 % Final     MCV 10/20/2021 91  78 - 100 fL Final     MCH 10/20/2021 30.4  26.5 - 33.0 pg Final     MCHC 10/20/2021 33.6  31.5 - 36.5 g/dL Final     RDW 10/20/2021 14.6  10.0 - 15.0 % Final     Platelet Count 10/20/2021 456* 150 - 450 10e3/uL Final     % Neutrophils 10/20/2021 49  % Final     % Lymphocytes 10/20/2021 32  % Final     % Monocytes 10/20/2021 13  % Final     % Eosinophils 10/20/2021 6  % Final     % Basophils 10/20/2021 0  % Final     % Immature Granulocytes 10/20/2021 0  % Final     NRBCs per 100 WBC 10/20/2021 0  <1 /100 Final     Absolute Neutrophils  10/20/2021 3.3  1.6 - 8.3 10e3/uL Final     Absolute Lymphocytes 10/20/2021 2.1  0.8 - 5.3 10e3/uL Final     Absolute Monocytes 10/20/2021 0.8  0.0 - 1.3 10e3/uL Final     Absolute Eosinophils 10/20/2021 0.4  0.0 - 0.7 10e3/uL Final     Absolute Basophils 10/20/2021 0.0  0.0 - 0.2 10e3/uL Final     Absolute Immature Granulocytes 10/20/2021 0.0  <=0.0 10e3/uL Final     Absolute NRBCs 10/20/2021 0.0  10e3/uL Final     TSH 10/20/2021 0.70  0.40 - 4.00 mU/L Final         ASSESSMENT  1.  Thyroid nodules.  Incidentally noted on PET/CT, hypermetabolic with increased FDG uptake, although not markedly high SUV.  Increased FDG uptake increases the risk of malignancy, although Hashimoto's thyroiditis (which the patient had likely has, as discussed below) also increases FDG avidity of the thyroid gland.  He does not have high risk history for thyroid cancer.  Would recommend thyroid ultrasound to better characterize thyroid nodules.  Based on these results, we can better determine whether to carefully observe or if additional intervention is warranted.  We reviewed low risk of malignancy and thyroid nodules, relative increase in risk of malignancy with increased FDG uptake, as well as proposed testing.  Patient is in agreement with our plan.    2.  Hypothyroidism.  Diagnosed in 1/2021.  Likely due to Hashimoto's thyroiditis, had positive antithyroglobulin antibody.  Clinically, appears euthyroid.  Additionally, thyroid function tests checked earlier this month were optimal.  Would continue current low dose of levothyroxine: We did review ways to take levothyroxine to ensure consistent absorption.    3.  Multiple myeloma.  Following in oncology.  Status post chemotherapy, anticipating autologous PBSCT.    4.  High risk for diabetes.  Hemoglobin A1c 6% on 1/25/2021.  Recommend careful monitoring, especially as patient's glucocorticoid exposure increases over the coming months during oncologic therapy.    PLAN  -Schedule thyroid  ultrasound at earliest convenience  -Continue current dose of levothyroxine, however separate from calcium supplement by at least 4 hours  -We will determine when we need to follow-up based on ultrasound results  -We will communicate results via MyChart, or if needed by phone      Orders Placed This Encounter   Procedures     US Thyroid     Video-Visit Details    Type of service:  Video Visit    Video Start Time: 10:03 AM  Video End Time: 10:20 AM    I spent a total of 68 minutes on the date of encounter reviewing medical records, evaluating the patient, coordinating care and documenting in the EHR, as detailed above.    Platform used for Video Visit: Adán Elder MD   Division of Diabetes, Endocrinology and Metabolism  Department of Medicine      Cc:Kyle Wang MD; Selma Johnson CNP

## 2021-10-26 LAB — SCANNED LAB RESULT: NORMAL

## 2021-10-28 ENCOUNTER — ANCILLARY PROCEDURE (OUTPATIENT)
Dept: ULTRASOUND IMAGING | Facility: CLINIC | Age: 51
End: 2021-10-28
Attending: INTERNAL MEDICINE
Payer: COMMERCIAL

## 2021-10-28 ENCOUNTER — TELEPHONE (OUTPATIENT)
Dept: TRANSPLANT | Facility: CLINIC | Age: 51
End: 2021-10-28

## 2021-10-28 DIAGNOSIS — E06.3 HYPOTHYROIDISM DUE TO HASHIMOTO'S THYROIDITIS: ICD-10-CM

## 2021-10-28 DIAGNOSIS — Z52.011 AUTOLOGOUS DONOR, STEM CELLS: Primary | ICD-10-CM

## 2021-10-28 DIAGNOSIS — C90.00 MULTIPLE MYELOMA, REMISSION STATUS UNSPECIFIED (H): Primary | ICD-10-CM

## 2021-10-28 DIAGNOSIS — Z11.59 ENCOUNTER FOR SCREENING FOR OTHER VIRAL DISEASES: ICD-10-CM

## 2021-10-28 DIAGNOSIS — R93.89 ABNORMAL IMAGING OF THYROID: ICD-10-CM

## 2021-10-28 PROCEDURE — 76536 US EXAM OF HEAD AND NECK: CPT | Performed by: RADIOLOGY

## 2021-10-28 NOTE — TELEPHONE ENCOUNTER
Patient called earlier in day to request schedule change for start of collections to accommodate a Latter day holiday that family expects him to be in attendance for. Collections schedule was pushed back by 2 days. A my Chart message was sent to the patient acknowledging completion of this change and reinforcing pre-procedure instructions for line placement.

## 2021-10-29 ENCOUNTER — TELEPHONE (OUTPATIENT)
Dept: ONCOLOGY | Facility: CLINIC | Age: 51
End: 2021-10-29

## 2021-10-29 ENCOUNTER — TELEPHONE (OUTPATIENT)
Dept: ENDOCRINOLOGY | Facility: CLINIC | Age: 51
End: 2021-10-29

## 2021-10-29 DIAGNOSIS — Z11.59 ENCOUNTER FOR SCREENING FOR OTHER VIRAL DISEASES: ICD-10-CM

## 2021-10-29 NOTE — TELEPHONE ENCOUNTER
[facility] Call Center    Phone Message    May a detailed message be left on voicemail: yes     Reason for Call: Pt called to schedule FNA, per Dr. Elder and next available is 11/16/21.  Pt is scheduled for BMT on 11/3/21.  Pt has concerns about a possible delay for BMT.  Please review and call pt with next  Steps.       Action Taken: Message routed to:  [facility] ([facility] endo    Travel Screening: Not Applicable

## 2021-10-29 NOTE — TELEPHONE ENCOUNTER
Attempted call to schedule FNA biopsy. No answer, no VM. Sent MyC with scheduling info. Will attempt 2nd call.

## 2021-10-29 NOTE — TELEPHONE ENCOUNTER
"[facility] Telephone Triage Note    Assessment:   June (pt) reporting the following symptoms:  Peripheral Neuropathy in BLE has increased in past week from toes to ankles.   \"Burning sensation and tingling\"  First few steps are difficult, then seems to be okay walking.   Tried tylenol, massage, heat and ice and can't seem to tell difference with relief efforts.   Rates 7-8/10 at night, during day 3-4/10.     Denies tingling/numbness in fingers and hands.     Denies fevers/chills, cough, sore throat, SOB, n/v, bowel & bladder issues.     Patient asking if can take Advil or any other txt that can be done to help relief the pain?    Recommendations:   This writer educated on wearing socks and shoes to protect feet, nonskid surfaces to floors and tubs, continue the walking, inspect skin for cuts, abrasions, and burn daily.     Routed to Dr. Vazquez and 9:33am paged Dr. Vazquez 9:36am approved to try Advil OTC to see if assist with pain and call back if no improvements or condition worsesn.       Follow-Up:    Instructed patient to seek care immediately for worsening symptoms, including: fever, chest pain, shortness of breath, dizziness, burns, uncontrolled bleeding from injuries, infection of wounds, unrelieved pain, sudden loss of function or sensation.     Called and relayed information to Pt, who verbalized understanding.        "

## 2021-10-30 NOTE — PROGRESS NOTES
Blood and Marrow Transplant Program      June Ronquillo is a 51 year old male with myeloma, here for formal review prior to HCT.    HPI: Start G-CSF priming  Interval History      Patient here to start G-CSF priming for an autologous transplant for multiple myeloma. He is doing well and has no systemic complaints. In particular he has no cough or shortness of breath. He has no fevers. Good oral intake. He does have some discomfort in his lower leg since he got the chemotherapy for the myeloma. Otherwise he is eating well and has good energy. Never had G-CSF before. No rashes. Central line planned for mid this week. Covid testing ongoing.       Review of Systems      No other findings except what is mentioned above.  PHYSICAL EXAM      Weight     Wt Readings from Last 3 Encounters:   10/31/21 75.6 kg (166 lb 9.6 oz)   10/20/21 72.8 kg (160 lb 9.6 oz)   10/18/21 72 kg (158 lb 11.7 oz)          Pulse 76   Temp 97.9  F (36.6  C)   Resp 18   Wt 75.6 kg (166 lb 9.6 oz)   SpO2 100%   BMI 26.90 kg/m       Physical Exam  Constitutional:       General: He is not in acute distress.     Appearance: Normal appearance. He is normal weight.   HENT:      Nose: Nose normal. No congestion.      Mouth/Throat:      Mouth: Mucous membranes are moist.      Pharynx: Oropharynx is clear. No oropharyngeal exudate.   Eyes:      Conjunctiva/sclera: Conjunctivae normal.   Cardiovascular:      Rate and Rhythm: Normal rate and regular rhythm.      Pulses: Normal pulses.      Heart sounds: Normal heart sounds. No murmur heard.     Pulmonary:      Effort: Pulmonary effort is normal.      Breath sounds: Normal breath sounds.   Abdominal:      General: Abdomen is flat. Bowel sounds are normal.      Palpations: Abdomen is soft.   Musculoskeletal:         General: No swelling. Normal range of motion.      Cervical back: Normal range of motion.   Lymphadenopathy:      Cervical: No cervical adenopathy.   Skin:     General: Skin is warm.    Neurological:      General: No focal deficit present.      Mental Status: He is alert and oriented to person, place, and time.   Psychiatric:         Mood and Affect: Mood normal.         Behavior: Behavior normal.       OVERALL ASSESSMENT AND PLAN     June Ronquillo is a 51 year old male with myeloma, here for formal review prior to HCT.  Patient is here today to get the first dose of G-CSF for priming for stem cell collections. We talked about G-CSF side effects and particular bone pain, flulike symptoms, and low-grade temperature. I recommended that he has some Claritin available at home. We proceeded with the first shot of G-CSF and monitor the patient for a institutional practice. He had no immediate side effects of medication and was released to come back to clinic tomorrow.    Brandon Rendon MD on 10/31/2021 at 10:29 AM

## 2021-10-31 ENCOUNTER — ONCOLOGY VISIT (OUTPATIENT)
Dept: TRANSPLANT | Facility: CLINIC | Age: 51
End: 2021-10-31
Attending: INTERNAL MEDICINE
Payer: COMMERCIAL

## 2021-10-31 VITALS
HEART RATE: 76 BPM | BODY MASS INDEX: 26.9 KG/M2 | TEMPERATURE: 97.9 F | RESPIRATION RATE: 18 BRPM | WEIGHT: 166.6 LBS | OXYGEN SATURATION: 100 %

## 2021-10-31 DIAGNOSIS — C90.00 MULTIPLE MYELOMA NOT HAVING ACHIEVED REMISSION (H): ICD-10-CM

## 2021-10-31 DIAGNOSIS — Z52.011 AUTOLOGOUS DONOR, STEM CELLS: Primary | ICD-10-CM

## 2021-10-31 PROCEDURE — 250N000011 HC RX IP 250 OP 636: Performed by: INTERNAL MEDICINE

## 2021-10-31 PROCEDURE — 96372 THER/PROPH/DIAG INJ SC/IM: CPT | Performed by: INTERNAL MEDICINE

## 2021-10-31 PROCEDURE — 99213 OFFICE O/P EST LOW 20 MIN: CPT

## 2021-10-31 PROCEDURE — G0463 HOSPITAL OUTPT CLINIC VISIT: HCPCS

## 2021-10-31 RX ORDER — HEPARIN SODIUM (PORCINE) LOCK FLUSH IV SOLN 100 UNIT/ML 100 UNIT/ML
5 SOLUTION INTRAVENOUS
Status: CANCELLED | OUTPATIENT
Start: 2021-11-01

## 2021-10-31 RX ORDER — HEPARIN SODIUM,PORCINE 10 UNIT/ML
5 VIAL (ML) INTRAVENOUS
Status: CANCELLED | OUTPATIENT
Start: 2021-11-01

## 2021-10-31 RX ADMIN — FILGRASTIM 780 MCG: 480 INJECTION, SOLUTION INTRAVENOUS; SUBCUTANEOUS at 10:06

## 2021-10-31 ASSESSMENT — PAIN SCALES - GENERAL: PAINLEVEL: NO PAIN (0)

## 2021-10-31 NOTE — LETTER
10/31/2021         RE: June Ronquillo  [address]  [facility] [address]        Dear Colleague,    Thank you for referring your patient, June Ronquillo, to the [facility] BLOOD AND MARROW TRANSPLANT PROGRAM [place]. Please see a copy of my visit note below.      Blood and Marrow Transplant Program      June Ronquillo is a 51 year old male with myeloma, here for formal review prior to HCT.    HPI: Start G-CSF priming  Interval History      Patient here to start G-CSF priming for an autologous transplant for multiple myeloma. He is doing well and has no systemic complaints. In particular he has no cough or shortness of breath. He has no fevers. Good oral intake. He does have some discomfort in his lower leg since he got the chemotherapy for the myeloma. Otherwise he is eating well and has good energy. Never had G-CSF before. No rashes. Central line planned for mid this week. Covid testing ongoing.       Review of Systems      No other findings except what is mentioned above.  PHYSICAL EXAM      Weight     Wt Readings from Last 3 Encounters:   10/31/21 75.6 kg (166 lb 9.6 oz)   10/20/21 72.8 kg (160 lb 9.6 oz)   10/18/21 72 kg (158 lb 11.7 oz)          Pulse 76   Temp 97.9  F (36.6  C)   Resp 18   Wt 75.6 kg (166 lb 9.6 oz)   SpO2 100%   BMI 26.90 kg/m       Physical Exam  Constitutional:       General: He is not in acute distress.     Appearance: Normal appearance. He is normal weight.   HENT:      Nose: Nose normal. No congestion.      Mouth/Throat:      Mouth: Mucous membranes are moist.      Pharynx: Oropharynx is clear. No oropharyngeal exudate.   Eyes:      Conjunctiva/sclera: Conjunctivae normal.   Cardiovascular:      Rate and Rhythm: Normal rate and regular rhythm.      Pulses: Normal pulses.      Heart sounds: Normal heart sounds. No murmur heard.     Pulmonary:      Effort: Pulmonary effort is normal.      Breath sounds: Normal breath sounds.   Abdominal:       General: Abdomen is flat. Bowel sounds are normal.      Palpations: Abdomen is soft.   Musculoskeletal:         General: No swelling. Normal range of motion.      Cervical back: Normal range of motion.   Lymphadenopathy:      Cervical: No cervical adenopathy.   Skin:     General: Skin is warm.   Neurological:      General: No focal deficit present.      Mental Status: He is alert and oriented to person, place, and time.   Psychiatric:         Mood and Affect: Mood normal.         Behavior: Behavior normal.       OVERALL ASSESSMENT AND PLAN     June Ronquillo is a 51 year old male with myeloma, here for formal review prior to HCT.  Patient is here today to get the first dose of G-CSF for priming for stem cell collections. We talked about G-CSF side effects and particular bone pain, flulike symptoms, and low-grade temperature. I recommended that he has some Claritin available at home. We proceeded with the first shot of G-CSF and monitor the patient for a institutional practice. He had no immediate side effects of medication and was released to come back to clinic tomorrow.    Brandon Rendon MD on 10/31/2021 at 10:29 AM

## 2021-10-31 NOTE — NURSING NOTE
"Oncology Rooming Note    October 31, 2021 10:16 AM   June Ronquillo is a 51 year old male who presents for:    Chief Complaint   Patient presents with     RECHECK     pre bmt for MM here for md visit and GCSF inj     Initial Vitals: Pulse 76   Temp 97.9  F (36.6  C)   Resp 18   Wt 75.6 kg (166 lb 9.6 oz)   SpO2 100%   BMI 26.90 kg/m   Estimated body mass index is 26.9 kg/m  as calculated from the following:    Height as of 10/18/21: 1.676 m (5' 5.98\").    Weight as of this encounter: 75.6 kg (166 lb 9.6 oz). Body surface area is 1.88 meters squared.  No Pain (0) Comment: Data Unavailable   No LMP for male patient.  Allergies reviewed: Yes  Medications reviewed: Yes    Medications: Medication refills not needed today.  Pharmacy name entered into [employer]:    [facility] [place], [place] - [address] [place] [address] PHARMACY [place], [place] - [address] [place] [address]    Clinical concerns: none   Pt received first dose GCSF, tolerated inj well, aware of future visits,   Pt reports he needs to be at work by 0800 on Tuesday morning and must have his GCSF inj by 0730      Alyce Gibbs RN              "

## 2021-11-01 ENCOUNTER — ALLIED HEALTH/NURSE VISIT (OUTPATIENT)
Dept: TRANSPLANT | Facility: CLINIC | Age: 51
End: 2021-11-01
Attending: INTERNAL MEDICINE
Payer: COMMERCIAL

## 2021-11-01 ENCOUNTER — LAB (OUTPATIENT)
Dept: LAB | Facility: CLINIC | Age: 51
End: 2021-11-01
Attending: RADIOLOGY

## 2021-11-01 VITALS
SYSTOLIC BLOOD PRESSURE: 143 MMHG | DIASTOLIC BLOOD PRESSURE: 89 MMHG | TEMPERATURE: 98.2 F | WEIGHT: 167.5 LBS | BODY MASS INDEX: 27.05 KG/M2 | OXYGEN SATURATION: 100 % | HEART RATE: 82 BPM | RESPIRATION RATE: 18 BRPM

## 2021-11-01 DIAGNOSIS — C90.00 MULTIPLE MYELOMA NOT HAVING ACHIEVED REMISSION (H): ICD-10-CM

## 2021-11-01 DIAGNOSIS — Z52.011 AUTOLOGOUS DONOR, STEM CELLS: Primary | ICD-10-CM

## 2021-11-01 DIAGNOSIS — Z11.59 ENCOUNTER FOR SCREENING FOR OTHER VIRAL DISEASES: ICD-10-CM

## 2021-11-01 LAB — SARS-COV-2 RNA RESP QL NAA+PROBE: NEGATIVE

## 2021-11-01 PROCEDURE — 250N000011 HC RX IP 250 OP 636: Performed by: INTERNAL MEDICINE

## 2021-11-01 PROCEDURE — U0005 INFEC AGEN DETEC AMPLI PROBE: HCPCS | Mod: 90 | Performed by: PATHOLOGY

## 2021-11-01 PROCEDURE — 96372 THER/PROPH/DIAG INJ SC/IM: CPT | Performed by: INTERNAL MEDICINE

## 2021-11-01 PROCEDURE — U0003 INFECTIOUS AGENT DETECTION BY NUCLEIC ACID (DNA OR RNA); SEVERE ACUTE RESPIRATORY SYNDROME CORONAVIRUS 2 (SARS-COV-2) (CORONAVIRUS DISEASE [COVID-19]), AMPLIFIED PROBE TECHNIQUE, MAKING USE OF HIGH THROUGHPUT TECHNOLOGIES AS DESCRIBED BY CMS-2020-01-R: HCPCS | Mod: 90 | Performed by: PATHOLOGY

## 2021-11-01 RX ORDER — HEPARIN SODIUM,PORCINE 10 UNIT/ML
5 VIAL (ML) INTRAVENOUS
Status: CANCELLED | OUTPATIENT
Start: 2021-11-02

## 2021-11-01 RX ORDER — HEPARIN SODIUM (PORCINE) LOCK FLUSH IV SOLN 100 UNIT/ML 100 UNIT/ML
5 SOLUTION INTRAVENOUS
Status: CANCELLED | OUTPATIENT
Start: 2021-11-02

## 2021-11-01 RX ADMIN — FILGRASTIM 780 MCG: 480 INJECTION, SOLUTION INTRAVENOUS; SUBCUTANEOUS at 07:46

## 2021-11-01 ASSESSMENT — PAIN SCALES - GENERAL: PAINLEVEL: MODERATE PAIN (5)

## 2021-11-01 NOTE — NURSING NOTE
Patient was given Neupogen 780mcg in Left arm. Patient tolerated well. See MAR for details.    Elenita Gatica LPN on 11/1/2021 at 7:51 AM

## 2021-11-01 NOTE — TELEPHONE ENCOUNTER
Coordinators unable to get closer appointment.    Called radiology and spoke to ultrasound department.     They can get patient in for FNA either this Wednesday, 11/3/2021 at 11 AM or Thursday, 11/4/2021 at 1 PM.  Would you please contact patient to see which time works for him and once finalized, please call radiology at [phone] to confirm which date the patient prefers.    Thank you.

## 2021-11-02 ENCOUNTER — ALLIED HEALTH/NURSE VISIT (OUTPATIENT)
Dept: TRANSPLANT | Facility: CLINIC | Age: 51
End: 2021-11-02
Attending: INTERNAL MEDICINE
Payer: COMMERCIAL

## 2021-11-02 ENCOUNTER — TELEPHONE (OUTPATIENT)
Dept: TRANSPLANT | Facility: CLINIC | Age: 51
End: 2021-11-02

## 2021-11-02 ENCOUNTER — ANCILLARY PROCEDURE (OUTPATIENT)
Dept: ULTRASOUND IMAGING | Facility: CLINIC | Age: 51
End: 2021-11-02
Attending: INTERNAL MEDICINE
Payer: COMMERCIAL

## 2021-11-02 ENCOUNTER — NURSE TRIAGE (OUTPATIENT)
Dept: ONCOLOGY | Facility: CLINIC | Age: 51
End: 2021-11-02

## 2021-11-02 ENCOUNTER — ANESTHESIA EVENT (OUTPATIENT)
Dept: SURGERY | Facility: AMBULATORY SURGERY CENTER | Age: 51
End: 2021-11-02
Payer: COMMERCIAL

## 2021-11-02 VITALS — TEMPERATURE: 98.2 F

## 2021-11-02 DIAGNOSIS — C90.00 MULTIPLE MYELOMA NOT HAVING ACHIEVED REMISSION (H): Primary | ICD-10-CM

## 2021-11-02 DIAGNOSIS — E04.1 THYROID NODULE: ICD-10-CM

## 2021-11-02 DIAGNOSIS — C90.00 MULTIPLE MYELOMA NOT HAVING ACHIEVED REMISSION (H): ICD-10-CM

## 2021-11-02 DIAGNOSIS — Z52.011 AUTOLOGOUS DONOR, STEM CELLS: Primary | ICD-10-CM

## 2021-11-02 PROCEDURE — 88173 CYTOPATH EVAL FNA REPORT: CPT | Mod: 26 | Performed by: PATHOLOGY

## 2021-11-02 PROCEDURE — 99207 US BIOPSY THYROID FINE NEEDLE ASPIRATION: CPT | Performed by: RADIOLOGY

## 2021-11-02 PROCEDURE — 88173 CYTOPATH EVAL FNA REPORT: CPT | Mod: TC | Performed by: INTERNAL MEDICINE

## 2021-11-02 PROCEDURE — 250N000011 HC RX IP 250 OP 636: Performed by: INTERNAL MEDICINE

## 2021-11-02 PROCEDURE — 96372 THER/PROPH/DIAG INJ SC/IM: CPT | Performed by: INTERNAL MEDICINE

## 2021-11-02 PROCEDURE — 88172 CYTP DX EVAL FNA 1ST EA SITE: CPT | Mod: 26 | Performed by: PATHOLOGY

## 2021-11-02 RX ORDER — HEPARIN SODIUM,PORCINE 10 UNIT/ML
5 VIAL (ML) INTRAVENOUS
Status: CANCELLED | OUTPATIENT
Start: 2022-04-18

## 2021-11-02 RX ORDER — HEPARIN SODIUM (PORCINE) LOCK FLUSH IV SOLN 100 UNIT/ML 100 UNIT/ML
5 SOLUTION INTRAVENOUS
Status: CANCELLED | OUTPATIENT
Start: 2022-04-18

## 2021-11-02 RX ORDER — HEPARIN SODIUM (PORCINE) LOCK FLUSH IV SOLN 100 UNIT/ML 100 UNIT/ML
5 SOLUTION INTRAVENOUS
Status: CANCELLED | OUTPATIENT
Start: 2021-11-03

## 2021-11-02 RX ORDER — HEPARIN SODIUM,PORCINE 10 UNIT/ML
5 VIAL (ML) INTRAVENOUS
Status: CANCELLED | OUTPATIENT
Start: 2021-11-03

## 2021-11-02 RX ORDER — LIDOCAINE HYDROCHLORIDE 10 MG/ML
5 INJECTION, SOLUTION INFILTRATION; PERINEURAL ONCE
Status: COMPLETED | OUTPATIENT
Start: 2021-11-02 | End: 2021-11-02

## 2021-11-02 RX ORDER — HEPARIN SODIUM (PORCINE) LOCK FLUSH IV SOLN 100 UNIT/ML 100 UNIT/ML
5 SOLUTION INTRAVENOUS
Status: CANCELLED | OUTPATIENT
Start: 2022-03-21

## 2021-11-02 RX ORDER — HEPARIN SODIUM,PORCINE 10 UNIT/ML
5 VIAL (ML) INTRAVENOUS
Status: CANCELLED | OUTPATIENT
Start: 2022-03-21

## 2021-11-02 RX ADMIN — FILGRASTIM 780 MCG: 480 INJECTION, SOLUTION INTRAVENOUS; SUBCUTANEOUS at 07:17

## 2021-11-02 RX ADMIN — LIDOCAINE HYDROCHLORIDE 3 ML: 10 INJECTION, SOLUTION INFILTRATION; PERINEURAL at 15:17

## 2021-11-02 NOTE — TELEPHONE ENCOUNTER
Spoke with Logan who states he is pretty sure he forgot to take the Claritin yesterday. He states he has now taken a dose and he feels that his back pain is subsiding. He says his legs are still uncomfortable but he would like to wait a while longer and see if this improves. If it does not improve he will call to triage line. He does have an DOROTA appointment tomorrow.

## 2021-11-02 NOTE — TELEPHONE ENCOUNTER
On review of chart I see that patient called into [facility] regarding pain from neupogen injections. Patient is currently being managed by BMT where neupogen is being administered for stem cell collections.    LVM for patient requesting call back regarding symptoms and questioning whether he is using daily claritin as advised in workup education sessions with myself and pharmacy. Provided BMT callback and triage numbers (previously reviewed in detail) as reminder to patient.

## 2021-11-02 NOTE — NURSING NOTE
780 mcg of Neupogen was administered subcutaneous in Right Arm. Patient tolerated injection with no incident. Patient informed of the side effects; body aches/joint pains and headaches that may occur following the injection. See BRANDI Gomez LPN November 2, 2021 7:28 AM

## 2021-11-02 NOTE — TELEPHONE ENCOUNTER
[facility] Triage    Incoming Call:    Pain from growth hormone shots    Pain lower back and both legs - 7-8/10 at night and this morning a 7/10.    Difficulty in walking  Tingling comes and goes  No numbess  No bladder or bowel problems    Advil and Tylenol are not working.    400 mg Advil rotated with 650 mg of Tyl rotating for the last 2 days without relief.    Had your injection this am with the BMT nurse and requested pain meds, but  BMT nurse and told him to call into triage.    Needs  Stronger pain medication.    Routing to Dr. Vazquez and Sho Mckeon    Routing to BMT RNCC Christine Reynolds Per Sho Mckeon

## 2021-11-02 NOTE — NURSING NOTE
Patient reports pain in low back 7/10 pain and in the legs bilaterally 5/10 especially at hour of sleep. LPN informed patient of potential side effects of injection medication. Patient advised to contact triage to request pain medication from provider.       Jillian Gomez LPN November 2, 2021 7:30 AM

## 2021-11-03 ENCOUNTER — ANESTHESIA (OUTPATIENT)
Dept: SURGERY | Facility: AMBULATORY SURGERY CENTER | Age: 51
End: 2021-11-03
Payer: COMMERCIAL

## 2021-11-03 ENCOUNTER — ONCOLOGY VISIT (OUTPATIENT)
Dept: TRANSPLANT | Facility: CLINIC | Age: 51
End: 2021-11-03
Attending: PHYSICIAN ASSISTANT
Payer: COMMERCIAL

## 2021-11-03 ENCOUNTER — APPOINTMENT (OUTPATIENT)
Dept: LAB | Facility: CLINIC | Age: 51
End: 2021-11-03
Attending: INTERNAL MEDICINE
Payer: COMMERCIAL

## 2021-11-03 ENCOUNTER — ALLIED HEALTH/NURSE VISIT (OUTPATIENT)
Dept: TRANSPLANT | Facility: CLINIC | Age: 51
End: 2021-11-03
Attending: INTERNAL MEDICINE
Payer: COMMERCIAL

## 2021-11-03 ENCOUNTER — ANCILLARY PROCEDURE (OUTPATIENT)
Dept: RADIOLOGY | Facility: AMBULATORY SURGERY CENTER | Age: 51
End: 2021-11-03
Attending: INTERNAL MEDICINE
Payer: COMMERCIAL

## 2021-11-03 ENCOUNTER — HOSPITAL ENCOUNTER (OUTPATIENT)
Facility: AMBULATORY SURGERY CENTER | Age: 51
End: 2021-11-03
Attending: RADIOLOGY
Payer: COMMERCIAL

## 2021-11-03 ENCOUNTER — HOME INFUSION (PRE-WILLOW HOME INFUSION) (OUTPATIENT)
Dept: PHARMACY | Facility: CLINIC | Age: 51
End: 2021-11-03

## 2021-11-03 ENCOUNTER — TELEPHONE (OUTPATIENT)
Dept: TRANSPLANT | Facility: CLINIC | Age: 51
End: 2021-11-03

## 2021-11-03 ENCOUNTER — ANCILLARY PROCEDURE (OUTPATIENT)
Dept: ULTRASOUND IMAGING | Facility: CLINIC | Age: 51
End: 2021-11-03
Attending: PHYSICIAN ASSISTANT
Payer: COMMERCIAL

## 2021-11-03 ENCOUNTER — ANCILLARY PROCEDURE (OUTPATIENT)
Dept: GENERAL RADIOLOGY | Facility: CLINIC | Age: 51
End: 2021-11-03
Attending: PHYSICIAN ASSISTANT
Payer: COMMERCIAL

## 2021-11-03 VITALS
BODY MASS INDEX: 27.29 KG/M2 | SYSTOLIC BLOOD PRESSURE: 135 MMHG | DIASTOLIC BLOOD PRESSURE: 82 MMHG | TEMPERATURE: 97.7 F | WEIGHT: 164.02 LBS | OXYGEN SATURATION: 100 % | HEART RATE: 80 BPM | RESPIRATION RATE: 18 BRPM

## 2021-11-03 VITALS
TEMPERATURE: 97.7 F | OXYGEN SATURATION: 100 % | HEART RATE: 80 BPM | BODY MASS INDEX: 27.29 KG/M2 | DIASTOLIC BLOOD PRESSURE: 82 MMHG | RESPIRATION RATE: 18 BRPM | WEIGHT: 164 LBS | SYSTOLIC BLOOD PRESSURE: 135 MMHG

## 2021-11-03 VITALS
BODY MASS INDEX: 26.99 KG/M2 | HEART RATE: 91 BPM | SYSTOLIC BLOOD PRESSURE: 128 MMHG | RESPIRATION RATE: 16 BRPM | OXYGEN SATURATION: 99 % | WEIGHT: 162 LBS | DIASTOLIC BLOOD PRESSURE: 85 MMHG | TEMPERATURE: 98 F | HEIGHT: 65 IN

## 2021-11-03 DIAGNOSIS — Z52.011 AUTOLOGOUS DONOR, STEM CELLS: ICD-10-CM

## 2021-11-03 DIAGNOSIS — C90.00 MULTIPLE MYELOMA NOT HAVING ACHIEVED REMISSION (H): Primary | ICD-10-CM

## 2021-11-03 DIAGNOSIS — I25.10 CORONARY ARTERY DISEASE INVOLVING NATIVE CORONARY ARTERY OF NATIVE HEART WITHOUT ANGINA PECTORIS: ICD-10-CM

## 2021-11-03 DIAGNOSIS — Z45.2 ENCOUNTER FOR CENTRAL LINE PLACEMENT: ICD-10-CM

## 2021-11-03 DIAGNOSIS — Z45.2 ENCOUNTER FOR CENTRAL LINE PLACEMENT: Primary | ICD-10-CM

## 2021-11-03 DIAGNOSIS — E06.3 HYPOTHYROIDISM DUE TO HASHIMOTO'S THYROIDITIS: ICD-10-CM

## 2021-11-03 DIAGNOSIS — C90.00 MULTIPLE MYELOMA NOT HAVING ACHIEVED REMISSION (H): ICD-10-CM

## 2021-11-03 LAB
BASOPHILS # BLD MANUAL: 0 10E3/UL (ref 0–0.2)
BASOPHILS NFR BLD MANUAL: 0 %
CD34 ABSOLUTE COUNT COMMENT: NORMAL
CD34 CELLS # SPEC: 40 CELLS/UL
CD34 CELLS NFR SPEC: 0.06 %
EOSINOPHIL # BLD MANUAL: 0 10E3/UL (ref 0–0.7)
EOSINOPHIL NFR BLD MANUAL: 0 %
ERYTHROCYTE [DISTWIDTH] IN BLOOD BY AUTOMATED COUNT: 15.2 % (ref 10–15)
HCT VFR BLD AUTO: 36.2 % (ref 40–53)
HGB BLD-MCNC: 12.1 G/DL (ref 13.3–17.7)
LYMPHOCYTES # BLD MANUAL: 2.2 10E3/UL (ref 0.8–5.3)
LYMPHOCYTES NFR BLD MANUAL: 4 %
MCH RBC QN AUTO: 31.5 PG (ref 26.5–33)
MCHC RBC AUTO-ENTMCNC: 33.4 G/DL (ref 31.5–36.5)
MCV RBC AUTO: 94 FL (ref 78–100)
MONOCYTES # BLD MANUAL: 5.5 10E3/UL (ref 0–1.3)
MONOCYTES NFR BLD MANUAL: 10 %
NEUTROPHILS # BLD MANUAL: 47.6 10E3/UL (ref 1.6–8.3)
NEUTROPHILS NFR BLD MANUAL: 86 %
PLAT MORPH BLD: ABNORMAL
PLATELET # BLD AUTO: 329 10E3/UL (ref 150–450)
PRODUCT NUMBER FLOW CYTOMETRY: NORMAL
RBC # BLD AUTO: 3.84 10E6/UL (ref 4.4–5.9)
RBC MORPH BLD: ABNORMAL
VIABLE CD34 CELLS NFR FLD: 96.77 %
WBC # BLD AUTO: 55.4 10E3/UL (ref 4–11)

## 2021-11-03 PROCEDURE — 36558 INSERT TUNNELED CV CATH: CPT | Mod: RT | Performed by: RADIOLOGY

## 2021-11-03 PROCEDURE — 36592 COLLECT BLOOD FROM PICC: CPT

## 2021-11-03 PROCEDURE — 250N000011 HC RX IP 250 OP 636: Performed by: INTERNAL MEDICINE

## 2021-11-03 PROCEDURE — 96372 THER/PROPH/DIAG INJ SC/IM: CPT | Performed by: INTERNAL MEDICINE

## 2021-11-03 PROCEDURE — 99213 OFFICE O/P EST LOW 20 MIN: CPT

## 2021-11-03 PROCEDURE — 77001 FLUOROGUIDE FOR VEIN DEVICE: CPT | Mod: 26 | Performed by: RADIOLOGY

## 2021-11-03 PROCEDURE — 76937 US GUIDE VASCULAR ACCESS: CPT | Mod: 26 | Performed by: RADIOLOGY

## 2021-11-03 PROCEDURE — 99214 OFFICE O/P EST MOD 30 MIN: CPT

## 2021-11-03 PROCEDURE — 250N000011 HC RX IP 250 OP 636: Performed by: PHYSICIAN ASSISTANT

## 2021-11-03 PROCEDURE — 76536 US EXAM OF HEAD AND NECK: CPT | Mod: GC | Performed by: RADIOLOGY

## 2021-11-03 PROCEDURE — 71046 X-RAY EXAM CHEST 2 VIEWS: CPT | Mod: GC | Performed by: RADIOLOGY

## 2021-11-03 PROCEDURE — G0463 HOSPITAL OUTPT CLINIC VISIT: HCPCS

## 2021-11-03 PROCEDURE — G0463 HOSPITAL OUTPT CLINIC VISIT: HCPCS | Mod: 25

## 2021-11-03 PROCEDURE — 86367 STEM CELLS TOTAL COUNT: CPT

## 2021-11-03 PROCEDURE — 85027 COMPLETE CBC AUTOMATED: CPT

## 2021-11-03 RX ORDER — LIDOCAINE 40 MG/G
CREAM TOPICAL
Status: DISCONTINUED | OUTPATIENT
Start: 2021-11-03 | End: 2021-11-04 | Stop reason: HOSPADM

## 2021-11-03 RX ORDER — PROPOFOL 10 MG/ML
INJECTION, EMULSION INTRAVENOUS CONTINUOUS PRN
Status: DISCONTINUED | OUTPATIENT
Start: 2021-11-03 | End: 2021-11-03

## 2021-11-03 RX ORDER — HEPARIN SODIUM (PORCINE) LOCK FLUSH IV SOLN 100 UNIT/ML 100 UNIT/ML
SOLUTION INTRAVENOUS PRN
Status: DISCONTINUED | OUTPATIENT
Start: 2021-11-03 | End: 2021-11-03 | Stop reason: HOSPADM

## 2021-11-03 RX ORDER — GABAPENTIN 300 MG/1
300 CAPSULE ORAL 2 TIMES DAILY
Qty: 60 CAPSULE | Refills: 1 | Status: ON HOLD | OUTPATIENT
Start: 2021-11-03 | End: 2021-11-11

## 2021-11-03 RX ORDER — HEPARIN SODIUM (PORCINE) LOCK FLUSH IV SOLN 100 UNIT/ML 100 UNIT/ML
3 SOLUTION INTRAVENOUS ONCE
Status: CANCELLED | OUTPATIENT
Start: 2021-11-03 | End: 2021-11-03

## 2021-11-03 RX ORDER — ONDANSETRON 2 MG/ML
4 INJECTION INTRAMUSCULAR; INTRAVENOUS EVERY 30 MIN PRN
Status: DISCONTINUED | OUTPATIENT
Start: 2021-11-03 | End: 2021-11-04 | Stop reason: HOSPADM

## 2021-11-03 RX ORDER — FENTANYL CITRATE 50 UG/ML
25 INJECTION, SOLUTION INTRAMUSCULAR; INTRAVENOUS
Status: DISCONTINUED | OUTPATIENT
Start: 2021-11-03 | End: 2021-11-04 | Stop reason: HOSPADM

## 2021-11-03 RX ORDER — HEPARIN SODIUM (PORCINE) LOCK FLUSH IV SOLN 100 UNIT/ML 100 UNIT/ML
3 SOLUTION INTRAVENOUS EVERY 24 HOURS
Status: DISCONTINUED | OUTPATIENT
Start: 2021-11-03 | End: 2021-11-04 | Stop reason: HOSPADM

## 2021-11-03 RX ORDER — HYDROMORPHONE HYDROCHLORIDE 1 MG/ML
0.2 INJECTION, SOLUTION INTRAMUSCULAR; INTRAVENOUS; SUBCUTANEOUS EVERY 5 MIN PRN
Status: DISCONTINUED | OUTPATIENT
Start: 2021-11-03 | End: 2021-11-04 | Stop reason: HOSPADM

## 2021-11-03 RX ORDER — HEPARIN SODIUM,PORCINE 10 UNIT/ML
5 VIAL (ML) INTRAVENOUS
Status: DISCONTINUED | OUTPATIENT
Start: 2021-11-03 | End: 2021-11-03 | Stop reason: HOSPADM

## 2021-11-03 RX ORDER — HEPARIN SODIUM,PORCINE 10 UNIT/ML
5 VIAL (ML) INTRAVENOUS
Status: CANCELLED | OUTPATIENT
Start: 2021-11-04

## 2021-11-03 RX ORDER — SODIUM CHLORIDE, SODIUM LACTATE, POTASSIUM CHLORIDE, CALCIUM CHLORIDE 600; 310; 30; 20 MG/100ML; MG/100ML; MG/100ML; MG/100ML
INJECTION, SOLUTION INTRAVENOUS CONTINUOUS
Status: DISCONTINUED | OUTPATIENT
Start: 2021-11-03 | End: 2021-11-04 | Stop reason: HOSPADM

## 2021-11-03 RX ORDER — OXYCODONE HYDROCHLORIDE 5 MG/1
5 TABLET ORAL EVERY 4 HOURS PRN
Status: DISCONTINUED | OUTPATIENT
Start: 2021-11-03 | End: 2021-11-04 | Stop reason: HOSPADM

## 2021-11-03 RX ORDER — LENALIDOMIDE 25 MG/1
CAPSULE ORAL
Status: ON HOLD | COMMUNITY
Start: 2021-11-03 | End: 2021-11-11

## 2021-11-03 RX ORDER — LEVOTHYROXINE SODIUM 50 UG/1
50 TABLET ORAL DAILY
Qty: 90 TABLET | Refills: 1 | Status: SHIPPED | OUTPATIENT
Start: 2021-11-03 | End: 2022-05-03

## 2021-11-03 RX ORDER — ONDANSETRON 4 MG/1
4 TABLET, ORALLY DISINTEGRATING ORAL EVERY 30 MIN PRN
Status: DISCONTINUED | OUTPATIENT
Start: 2021-11-03 | End: 2021-11-04 | Stop reason: HOSPADM

## 2021-11-03 RX ORDER — FENTANYL CITRATE 50 UG/ML
25 INJECTION, SOLUTION INTRAMUSCULAR; INTRAVENOUS EVERY 5 MIN PRN
Status: DISCONTINUED | OUTPATIENT
Start: 2021-11-03 | End: 2021-11-04 | Stop reason: HOSPADM

## 2021-11-03 RX ORDER — ACETAMINOPHEN 325 MG/1
975 TABLET ORAL ONCE
Status: COMPLETED | OUTPATIENT
Start: 2021-11-03 | End: 2021-11-03

## 2021-11-03 RX ORDER — HEPARIN SODIUM (PORCINE) LOCK FLUSH IV SOLN 100 UNIT/ML 100 UNIT/ML
5 SOLUTION INTRAVENOUS
Status: DISCONTINUED | OUTPATIENT
Start: 2021-11-03 | End: 2021-11-03 | Stop reason: HOSPADM

## 2021-11-03 RX ORDER — HYDRALAZINE HYDROCHLORIDE 20 MG/ML
2.5-5 INJECTION INTRAMUSCULAR; INTRAVENOUS EVERY 10 MIN PRN
Status: DISCONTINUED | OUTPATIENT
Start: 2021-11-03 | End: 2021-11-04 | Stop reason: HOSPADM

## 2021-11-03 RX ORDER — MEPERIDINE HYDROCHLORIDE 25 MG/ML
12.5 INJECTION INTRAMUSCULAR; INTRAVENOUS; SUBCUTANEOUS
Status: DISCONTINUED | OUTPATIENT
Start: 2021-11-03 | End: 2021-11-04 | Stop reason: HOSPADM

## 2021-11-03 RX ORDER — LIDOCAINE HYDROCHLORIDE 20 MG/ML
INJECTION, SOLUTION INFILTRATION; PERINEURAL PRN
Status: DISCONTINUED | OUTPATIENT
Start: 2021-11-03 | End: 2021-11-03

## 2021-11-03 RX ORDER — HEPARIN SODIUM (PORCINE) LOCK FLUSH IV SOLN 100 UNIT/ML 100 UNIT/ML
5 SOLUTION INTRAVENOUS
Status: CANCELLED | OUTPATIENT
Start: 2021-11-04

## 2021-11-03 RX ORDER — METOPROLOL TARTRATE 1 MG/ML
1-2 INJECTION, SOLUTION INTRAVENOUS EVERY 5 MIN PRN
Status: DISCONTINUED | OUTPATIENT
Start: 2021-11-03 | End: 2021-11-04 | Stop reason: HOSPADM

## 2021-11-03 RX ORDER — CEFAZOLIN SODIUM 2 G/50ML
2 SOLUTION INTRAVENOUS
Status: COMPLETED | OUTPATIENT
Start: 2021-11-03 | End: 2021-11-03

## 2021-11-03 RX ORDER — HEPARIN SODIUM (PORCINE) LOCK FLUSH IV SOLN 100 UNIT/ML 100 UNIT/ML
3 SOLUTION INTRAVENOUS
Status: DISCONTINUED | OUTPATIENT
Start: 2021-11-03 | End: 2021-11-04 | Stop reason: HOSPADM

## 2021-11-03 RX ADMIN — LIDOCAINE HYDROCHLORIDE 60 MG: 20 INJECTION, SOLUTION INFILTRATION; PERINEURAL at 08:00

## 2021-11-03 RX ADMIN — ACETAMINOPHEN 975 MG: 325 TABLET ORAL at 07:17

## 2021-11-03 RX ADMIN — CEFAZOLIN SODIUM 2 G: 2 SOLUTION INTRAVENOUS at 07:56

## 2021-11-03 RX ADMIN — PROPOFOL 200 MCG/KG/MIN: 10 INJECTION, EMULSION INTRAVENOUS at 08:01

## 2021-11-03 RX ADMIN — Medication 5 ML: at 10:04

## 2021-11-03 RX ADMIN — SODIUM CHLORIDE, SODIUM LACTATE, POTASSIUM CHLORIDE, CALCIUM CHLORIDE: 600; 310; 30; 20 INJECTION, SOLUTION INTRAVENOUS at 07:18

## 2021-11-03 RX ADMIN — FILGRASTIM 780 MCG: 480 INJECTION, SOLUTION INTRAVENOUS; SUBCUTANEOUS at 10:28

## 2021-11-03 RX ADMIN — Medication 5 ML: at 10:03

## 2021-11-03 ASSESSMENT — PAIN SCALES - GENERAL
PAINLEVEL: MODERATE PAIN (5)
PAINLEVEL: MODERATE PAIN (5)

## 2021-11-03 ASSESSMENT — ENCOUNTER SYMPTOMS
DYSRHYTHMIAS: 0
SEIZURES: 0

## 2021-11-03 ASSESSMENT — MIFFLIN-ST. JEOR: SCORE: 1506.71

## 2021-11-03 ASSESSMENT — LIFESTYLE VARIABLES: TOBACCO_USE: 0

## 2021-11-03 NOTE — NURSING NOTE
780 mcg of Neupogen was administered subcutaneous in Left Arm. Patient tolerated injection with no incident. Patient informed of the side effects; body aches/joint pains and headaches that may occur following the injection. See MAR for additional details.       Jillian Gomez LPN November 3, 2021 10:41 AM
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
## 2021-11-03 NOTE — NURSING NOTE
"Oncology Rooming Note    November 3, 2021 3:03 PM   June Ronquillo is a 51 year old male who presents for:    Chief Complaint   Patient presents with     Oncology Clinic Visit     MULTIPLE MYELOMA     Initial Vitals: /82   Pulse 80   Temp 97.7  F (36.5  C)   Resp 18   Wt 74.4 kg (164 lb 0.4 oz)   SpO2 100%   BMI 27.29 kg/m   Estimated body mass index is 27.29 kg/m  as calculated from the following:    Height as of an earlier encounter on 11/3/21: 1.651 m (5' 5\").    Weight as of this encounter: 74.4 kg (164 lb 0.4 oz). Body surface area is 1.85 meters squared.  Moderate Pain (5) Comment: Data Unavailable   No LMP for male patient.  Allergies reviewed: Yes  Medications reviewed: Yes    Medications: Medication refills not needed today.  Pharmacy name entered into [employer]:    [facility] [place], [place] - [address] [facility] [place], [place] - [address] [place] [address]    Clinical concerns: Concerns about port/line.        [facility]            "

## 2021-11-03 NOTE — NURSING NOTE
Chief Complaint   Patient presents with     Labs Only     cvc, heparin locked, vitals checked     Alyce Gibbs RN on 11/3/2021 at 10:01 AM

## 2021-11-03 NOTE — PATIENT INSTRUCTIONS
Return tomorrow, 11/4/2021, for apheresis and provider visit  Please cancel appointment for RN visit for G-CSF and lab appointment as both will be done in apheresis
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
## 2021-11-03 NOTE — NURSING NOTE
"Oncology Rooming Note    November 3, 2021 10:26 AM   June Ronquillo is a 51 year old male who presents for:    Chief Complaint   Patient presents with     Labs Only     cvc, heparin locked, vitals checked     Oncology Clinic Visit     Multiple myeloma not having achieved remission (H)     Initial Vitals: /82   Pulse 80   Temp 97.7  F (36.5  C)   Resp 18   Wt 74.4 kg (164 lb)   SpO2 100%   BMI 27.29 kg/m   Estimated body mass index is 27.29 kg/m  as calculated from the following:    Height as of an earlier encounter on 11/3/21: 1.651 m (5' 5\").    Weight as of this encounter: 74.4 kg (164 lb). Body surface area is 1.85 meters squared.  Moderate Pain (5) Comment: Data Unavailable   No LMP for male patient.  Allergies reviewed: Yes  Medications reviewed: Yes    Medications: MEDICATION REFILLS NEEDED TODAY. Provider was notified.  Pharmacy name entered into [employer]:    [facility] [place], [place] - [address] [facility] [place], [place] - [address]    Clinical concerns: Patient needs a refill of Levothyroxine. Reports pain 5/10 in the low back, legs, and feet bilaterally. Pain is worse at night 8/10-poor sleep quality due to pain. Patient would like something for pain prescribed.        Jillian Gomez LPN November 3, 2021 10:27 AM                "

## 2021-11-03 NOTE — BRIEF OP NOTE
[facility]    Brief Operative Note    Pre-operative diagnosis: Multiple myeloma not having achieved remission (H) [C90.00]  Post-operative diagnosis Same as pre-operative diagnosis    Procedure: Procedure(s):  NON VALVED TUNNELED DUAL LUMEN APHARESIS CAPABLE LINE INSERTION, VASCULAR ACCESS CATHETER  Surgeon: Surgeon(s) and Role:     * Werner Mcnamara MD - Primary  Anesthesia: Monitor Anesthesia Care   Estimated Blood Loss: Minimal    Drains: None  Specimens: * No specimens in log *  Findings:   None.  Complications: None.  Implants: * No implants in log *    19 cm tip to cuff 14.5 Turkish Palindrome tunneled central venous catheter placed via right internal jugular vein. Tip in the mid right atrium. Heparin locked and ready for immediate use.

## 2021-11-03 NOTE — ANESTHESIA POSTPROCEDURE EVALUATION
Patient: June Ronquillo    Procedure: Procedure(s):  NON VALVED TUNNELED DUAL LUMEN APHARESIS CAPABLE LINE INSERTION, VASCULAR ACCESS CATHETER       Diagnosis:Multiple myeloma not having achieved remission (H) [C90.00]  Diagnosis Additional Information: No value filed.    Anesthesia Type:  MAC    Note:  Disposition: Outpatient   Postop Pain Control: Uneventful            Sign Out: Well controlled pain   PONV: No   Neuro/Psych: Uneventful            Sign Out: Acceptable/Baseline neuro status   Airway/Respiratory: Uneventful            Sign Out: Acceptable/Baseline resp. status   CV/Hemodynamics: Uneventful            Sign Out: Acceptable CV status; No obvious hypovolemia; No obvious fluid overload   Other NRE: NONE   DID A NON-ROUTINE EVENT OCCUR? No           Last vitals:  Vitals Value Taken Time   /85 11/03/21 0850   Temp 36.7  C (98  F) 11/03/21 0850   Pulse 91 11/03/21 0850   Resp 16 11/03/21 0850   SpO2 99 % 11/03/21 0850       Electronically Signed By: Orlando Carranza MD  November 3, 2021  9:33 AM
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
## 2021-11-03 NOTE — ANESTHESIA CARE TRANSFER NOTE
Patient: June Ronquillo    Procedure: Procedure(s):  NON VALVED TUNNELED DUAL LUMEN APHARESIS CAPABLE LINE INSERTION, VASCULAR ACCESS CATHETER       Diagnosis: Multiple myeloma not having achieved remission (H) [C90.00]  Diagnosis Additional Information: No value filed.    Anesthesia Type:   MAC     Note:    Oropharynx: oropharynx clear of all foreign objects and spontaneously breathing  Level of Consciousness: drowsy  Oxygen Supplementation: room air    Independent Airway: airway patency satisfactory and stable  Dentition: dentition unchanged  Vital Signs Stable: post-procedure vital signs reviewed and stable  Report to RN Given: handoff report given  Patient transferred to: Phase II    Handoff Report: Identifed the Patient, Identified the Reponsible Provider, Reviewed the pertinent medical history, Discussed the surgical course, Reviewed Intra-OP anesthesia mangement and issues during anesthesia, Set expectations for post-procedure period and Allowed opportunity for questions and acknowledgement of understanding      Vitals:  Vitals Value Taken Time   /75 11/03/21 0835   Temp 36.7  C (98  F) 11/03/21 0835   Pulse 90 11/03/21 0835   Resp 16 11/03/21 0835   SpO2 98 % 11/03/21 0835       Electronically Signed By: PATTY Sullivan CRNA  November 3, 2021  8:36 AM

## 2021-11-03 NOTE — PROGRESS NOTES
BMT Clinic Note      Mr. Ronquillo is a 50 yo man with MM IgG lambda here to continue GCSF priming in preparation for stem cell collections & auto PBSCT , Day +4 of G-CSF only     HPI:  Patient is tolerating the G-CSF without much issue.  He is taking claritin and tylenol. No fevers.  No new cough or sob.  No bleeding.  No rash.  He did call the triage pager secondary to painful/tingling feet, bilateral to ankle.  This started about 2 weeks ago and is worse at night. No skin lesions or changes on exam.  Feet are not swollen.     Review of Systems: 8 point ROS negative except as noted above.     Physical Exam:       Blood pressure 135/82, pulse 80, temperature 97.7  F (36.5  C), resp. rate 18, weight 74.4 kg (164 lb), SpO2 100 %.      General: NAD   Eyes: : CESAR, sclera anicteric   Nose/Mouth/Throat: OP clear, buccal mucosa moist, no ulcerations   Lungs: CTA bilaterally  Cardiovascular: RRR, no M/R/G   Abdominal/Rectal: +BS, soft, NT, ND  Lymphatics: no edema  Skin: no rashes or petechaie- no lesions or blisters on feet  Neuro: A&O   [place]:  Just placed, not bleeding    Labs:  Lab Results   Component Value Date    WBC 55.4 11/03/2021    WBC 9.6 07/09/2021     Lab Results   Component Value Date    RBC 3.84 11/03/2021    RBC 3.62 07/09/2021     Lab Results   Component Value Date    HGB 12.1 11/03/2021    HGB 10.9 07/09/2021     Lab Results   Component Value Date    HCT 36.2 11/03/2021    HCT 32.1 07/09/2021     No components found for: MCT  Lab Results   Component Value Date    MCV 94 11/03/2021    MCV 89 07/09/2021     Lab Results   Component Value Date    MCH 31.5 11/03/2021    MCH 30.1 07/09/2021     Lab Results   Component Value Date    MCHC 33.4 11/03/2021    MCHC 34.0 07/09/2021     Lab Results   Component Value Date    RDW 15.2 11/03/2021    RDW 17.0 07/09/2021     Lab Results   Component Value Date     11/03/2021     07/09/2021     Lab Results   Component Value Date    WBC 55.4 (HH) 11/03/2021     ANEU 47.6 (H) 11/03/2021    HGB 12.1 (L) 11/03/2021    HCT 36.2 (L) 11/03/2021     11/03/2021     10/20/2021    POTASSIUM 3.3 (L) 10/20/2021    CHLORIDE 107 10/20/2021    CO2 24 10/20/2021     (H) 10/20/2021    BUN 15 10/20/2021    CR 0.96 10/20/2021    CR 0.96 10/20/2021    MAG 2.2 10/13/2021    INR 0.95 10/14/2021    BILITOTAL 0.5 10/20/2021    AST 18 10/20/2021    ALT 17 10/20/2021    ALKPHOS 64 10/20/2021    PROTTOTAL 7.0 10/20/2021    ALBUMIN 4.2 10/20/2021       I have assessed all abnormal lab values for their clinical significance and any values considered clinically significant have been addressed in the assessment and plan.        Mr. Ronquillo is a 50 yo man with MM IgG lambda here to continue GCSF priming in preparation for stem cell collections & auto PBSCT , Day +4 of G-CSF only   1.  BMT: GCSF daily through collections.  Goal is 5x10^6- one collection due to high risk cytogenetics  - taking claritin and tylenol for bone pain  - 11/2PB CD34=40 , called apheresis and he will start collecting on 11/4.  Called patient and told him to come in at 08:00 am.     2. Heme:  -leukocytosis secondary to growth factor  Transfuse at 7 and 10  -anemia from MM  -continue aspirin for now, consider stopping fish oil when platelets fall       3.  ID:  remains on acyclovir  -levaquin on his med list, need to ask why he is taking this?  4. CV:    -Continue cozaar and hydrochlorothiazide for hypertension  -on crestor  5. FEN:  On K 20 meq daily  6. Neuro:  Neuropathy, bilateral feet, start gabapentin 300 mg po bid.  Discussed side effects    RTC:  For apheresis tomorrow with labs and G-CSF  6. Endocrine: remains on levothyhroxine       RTC daily    Haven Wells PA-C  [id number]/3/2021  30 minutes spent on the date of the encounter doing chart review, review of test results, interpretation of tests, patient visit and documentation . Coodinating with patient and apheresis.

## 2021-11-03 NOTE — TELEPHONE ENCOUNTER
"Called Ginette to discuss triage page. Pt had his central line placed this morning and this afternoon the pt's granddaughter jumped on him without warning and had since then Ginette has noticed blood at the insertion site and that the skin from the clavicle to the insertion site is \"raised\" and tender. Bleeding from insertion site is minimal per Ginette's report. Pt is otherwise feeling okay and not lightheaded or dizzy.     Arranged for pt to have CXR and ultrasound of central line in clinic this afternoon followed by review with provider.     Shukri Moffett PA-C  [phone]  "

## 2021-11-03 NOTE — ANESTHESIA PREPROCEDURE EVALUATION
Anesthesia Pre-Procedure Evaluation    Patient: June Ronquillo   MRN: [id number] : 1970        Preoperative Diagnosis: Multiple myeloma not having achieved remission (H) [C90.00]    Procedure : Procedure(s):  NON VALVED TUNNELED DUAL LUMEN APHARESIS CAPABLE LINE INSERTION, VASCULAR ACCESS CATHETER          Past Medical History:   Diagnosis Date     CAD (coronary artery disease)     s/p stent to CFX     Heart attack (H)      Hyperlipidemia LDL goal <100      Hypertension      Stented coronary artery      Thyroid disease       Past Surgical History:   Procedure Laterality Date     BONE MARROW BIOPSY, BONE SPECIMEN, NEEDLE/TROCAR Left 2021    Procedure: BIOPSY, BONE MARROW with aspiration and ancillary studies;  Surgeon: Niharika Regalado MD;  Location: [facility] OR     BONE MARROW BIOPSY, BONE SPECIMEN, NEEDLE/TROCAR Left 10/14/2021    Procedure: BIOPSY, BONE MARROW;  Surgeon: Alexa Albert APRN CNP;  Location: [facility] OR     HEART CATH PTCA SINGLE VESSEL  10/10/2004    Stent to CFX - [facility]       Allergies   Allergen Reactions     Blood Transfusion Related (Informational Only) Other (See Comments)     Patient has a history of a clinically significant antibody against RBC antigens.  A delay in compatible RBCs may occur.       Social History     Tobacco Use     Smoking status: Never Smoker     Smokeless tobacco: Never Used   Substance Use Topics     Alcohol use: Not Currently      Wt Readings from Last 1 Encounters:   21 73.5 kg (162 lb)        Anesthesia Evaluation   Pt has had prior anesthetic. Type: General.    No history of anesthetic complications       ROS/MED HX  ENT/Pulmonary:    (-) tobacco use, asthma and sleep apnea   Neurologic:    (-) no seizures and no CVA   Cardiovascular:     (+) hypertension--CAD --stent (prior stent to CFX)-Drug Eluting Stent.  (-) taking anticoagulants/antiplatelets, arrhythmias and murmur   METS/Exercise Tolerance: >4 METS    Hematologic:     (-) anemia   Musculoskeletal:   (+) fracture, Fractures: Previous rib fractures prompting eval for MM, but no current fractures.     GI/Hepatic:    (-) GERD and liver disease   Renal/Genitourinary:    (-) renal disease   Endo:     (+) thyroid problem, hypothyroidism,     Psychiatric/Substance Use:    (-) psychiatric history   Infectious Disease:       Malignancy:   (+) Malignancy (Multiple myeloma, normal calcium, prior fractures prompted eval), History of Lymphoma/Leukemia.Lymph CA Active status post.        Other:      (+) , no H/O Chronic Pain,        Physical Exam    Airway        Mallampati: II   TM distance: > 3 FB   Neck ROM: full   Mouth opening: > 3 cm    Respiratory Devices and Support         Dental  no notable dental history         Cardiovascular          Rhythm and rate: regular and normal (-) no murmur    Pulmonary           breath sounds clear to auscultation           OUTSIDE LABS:  CBC:   Lab Results   Component Value Date    WBC 6.7 10/20/2021    WBC 6.5 10/14/2021    HGB 13.0 (L) 10/20/2021    HGB 13.5 10/14/2021    HCT 38.7 (L) 10/20/2021    HCT 38.1 (L) 10/14/2021     (H) 10/20/2021     (H) 10/14/2021     BMP:   Lab Results   Component Value Date     10/20/2021     10/14/2021    POTASSIUM 3.3 (L) 10/20/2021    POTASSIUM 3.6 10/14/2021    CHLORIDE 107 10/20/2021    CHLORIDE 109 10/14/2021    CO2 24 10/20/2021    CO2 25 10/14/2021    BUN 15 10/20/2021    BUN 16 10/14/2021    CR 0.96 10/20/2021    CR 0.96 10/20/2021     (H) 10/20/2021     (H) 10/15/2021     COAGS:   Lab Results   Component Value Date    PTT 31 10/13/2021    INR 0.95 10/14/2021     POC: No results found for: BGM, HCG, HCGS  HEPATIC:   Lab Results   Component Value Date    ALBUMIN 4.2 10/20/2021    PROTTOTAL 7.0 10/20/2021    ALT 17 10/20/2021    AST 18 10/20/2021    ALKPHOS 64 10/20/2021    BILITOTAL 0.5 10/20/2021     OTHER:   Lab Results   Component Value Date    A1C 6.0 (H) 01/25/2021     HARDEEP 8.8 10/20/2021    PHOS 2.2 (L) 10/13/2021    MAG 2.2 10/13/2021    TSH 0.70 10/20/2021    T4 0.97 10/20/2021    CRP <2.9 04/30/2021       Anesthesia Plan    ASA Status:  3   NPO Status:  NPO Appropriate    Anesthesia Type: MAC.     - Reason for MAC: immobility needed   Induction: Propofol.           Consents    Anesthesia Plan(s) and associated risks, benefits, and realistic alternatives discussed. Questions answered and patient/representative(s) expressed understanding.     - Discussed with:  Patient      - Specific Concerns: PONV, post op pain, possibility of conversion to general with airway instrumentation and associated risks.     - Extended Intubation/Ventilatory Support Discussed: No.      - Patient is DNR/DNI Status: No    Use of blood products discussed: No .     Postoperative Care    Pain management: IV analgesics, Oral pain medications.   PONV prophylaxis: Background Propofol Infusion, Ondansetron (or other 5HT-3)     Comments:                Orlando Carranza MD

## 2021-11-03 NOTE — DISCHARGE INSTRUCTIONS
A collaboration between [facility] and [facility]  Experts in minimally invasive, targeted treatments performed using imaging guidance    Venous Access Device,  Port Catheter or Tunneled or Non-Tunneled Central Line Placement    Today you had a procedure today to install a venous access device; either a tunneled central vein catheter or a subcutaneous port catheter.    After you go home:  - Drink plenty of fluids.  Generally 6-8 (8 ounce) glasses a day is recommended.  - Resume your regular diet unless otherwise ordered by a medical provider.  - Keep any applied tape/gauze dressings clean and dry.  Change tape/gauze dressings if they get wet or soiled.  - You may shower the following day after procedure, however cover and protect from moisture any tape/gauze dressings.  You may let water hit and run over dried skin glue, but do not scrub.  Pat the area dry after showering.  - Port placement incisions are closed with absorbable suture, meaning they do not need to be removed at a later date, and a topical skin adhesive (skin glue).  This glue will wear off in 7-14 days.  Do not remove before this time.  If 14 days have passed and residual glue is present, you may gently remove it.  - Do not apply gels, lotions, or ointments to the glue site for the first 10 days as this may cause the glue to prematurely soften and fail.  - Do not perform strenuous activities or lift greater than 10 pounds for the next three days.  - If there is bleeding or oozing from the procedure site, apply firm pressure to the area for 5-10 minutes.  If the bleeding continues seek medical advice at the numbers below.  - Mild procedure site discomfort can be treated with an ice pack and over-the-counter pain relievers.        For 24 hours after any sedation used:  - Relax and take it easy.  No strenuous activities.  - Do not drive or operate machines at home or at work.  - No alcohol  consumption.  - Do not make any important or legal decisions.    Call our Interventional Radiology (IR) service if:  - If you start bleeding from the procedure site.  If you do start to bleed from the site, lie down and hold some pressure on the site.  Our radiology provider can help you decide if you need to return to the hospital.  - If you have new or worsening pain related to the procedure.  - If you have concerning swelling at the procedure site.  - If you develop persistent nausea or vomiting.  - If you develop hives or a rash or any unexplained itching.  - If you have a fever of greater than 100.5  F and chills in the first 5 days after procedure.  - Any other concerns related to your procedure.      [facility]  Interventional Radiology (IR)  [address]  [address] [facility] [place] [address]    Contact Number:  [phone]  (IR control desk)  - Monday - Friday 8:00 am - 4:30 pm    After hours for urgent concerns:  [phone]  - After 4:30 pm Monday - Friday, Weekends and Holidays.   - Ask for Interventional Radiology on-call.  Someone is available 24 hours a day.  - [facility] toll free number:  [phone]

## 2021-11-03 NOTE — H&P
Patient presents for apheresis capable tunneled central venous catheter placement.    No interval change to H&P.    Consent obtained.

## 2021-11-04 ENCOUNTER — HOSPITAL ENCOUNTER (OUTPATIENT)
Dept: LAB | Facility: CLINIC | Age: 51
End: 2021-11-04
Attending: INTERNAL MEDICINE
Payer: COMMERCIAL

## 2021-11-04 ENCOUNTER — ONCOLOGY VISIT (OUTPATIENT)
Dept: TRANSPLANT | Facility: CLINIC | Age: 51
End: 2021-11-04
Attending: PHYSICIAN ASSISTANT
Payer: COMMERCIAL

## 2021-11-04 VITALS
DIASTOLIC BLOOD PRESSURE: 90 MMHG | SYSTOLIC BLOOD PRESSURE: 160 MMHG | HEART RATE: 89 BPM | BODY MASS INDEX: 27.44 KG/M2 | TEMPERATURE: 98.8 F | RESPIRATION RATE: 18 BRPM | WEIGHT: 164.9 LBS

## 2021-11-04 DIAGNOSIS — Z52.011 AUTOLOGOUS DONOR, STEM CELLS: Primary | ICD-10-CM

## 2021-11-04 DIAGNOSIS — C90.00 MULTIPLE MYELOMA NOT HAVING ACHIEVED REMISSION (H): ICD-10-CM

## 2021-11-04 DIAGNOSIS — C90.00 MULTIPLE MYELOMA NOT HAVING ACHIEVED REMISSION (H): Primary | ICD-10-CM

## 2021-11-04 LAB
ANION GAP SERPL CALCULATED.3IONS-SCNC: 7 MMOL/L (ref 3–14)
BASOPHILS # BLD MANUAL: 0 10E3/UL (ref 0–0.2)
BASOPHILS NFR BLD MANUAL: 0 %
BUN SERPL-MCNC: 9 MG/DL (ref 7–30)
CALCIUM SERPL-MCNC: 9 MG/DL (ref 8.5–10.1)
CD34 ABSOLUTE COUNT COMMENT: NORMAL
CD34 CELLS # SPEC: 84 CELLS/UL
CD34 CELLS NFR SPEC: 0.11 %
CHLORIDE BLD-SCNC: 108 MMOL/L (ref 94–109)
CO2 SERPL-SCNC: 24 MMOL/L (ref 20–32)
CREAT SERPL-MCNC: 0.84 MG/DL (ref 0.66–1.25)
EOSINOPHIL # BLD MANUAL: 0.7 10E3/UL (ref 0–0.7)
EOSINOPHIL NFR BLD MANUAL: 1 %
ERYTHROCYTE [DISTWIDTH] IN BLOOD BY AUTOMATED COUNT: 15.4 % (ref 10–15)
GFR SERPL CREATININE-BSD FRML MDRD: >90 ML/MIN/1.73M2
GLUCOSE BLD-MCNC: 154 MG/DL (ref 70–99)
HCT VFR BLD AUTO: 37.3 % (ref 40–53)
HGB BLD-MCNC: 12 G/DL (ref 13.3–17.7)
LYMPHOCYTES # BLD MANUAL: 3.5 10E3/UL (ref 0.8–5.3)
LYMPHOCYTES NFR BLD MANUAL: 5 %
MAGNESIUM SERPL-MCNC: 1.8 MG/DL (ref 1.6–2.3)
MCH RBC QN AUTO: 31.3 PG (ref 26.5–33)
MCHC RBC AUTO-ENTMCNC: 32.2 G/DL (ref 31.5–36.5)
MCV RBC AUTO: 97 FL (ref 78–100)
METAMYELOCYTES # BLD MANUAL: 1.4 10E3/UL
METAMYELOCYTES NFR BLD MANUAL: 2 %
MONOCYTES # BLD MANUAL: 5.6 10E3/UL (ref 0–1.3)
MONOCYTES NFR BLD MANUAL: 8 %
NEUTROPHILS # BLD MANUAL: 55.6 10E3/UL (ref 1.6–8.3)
NEUTROPHILS NFR BLD MANUAL: 80 %
PATH REPORT.COMMENTS IMP SPEC: ABNORMAL
PATH REPORT.COMMENTS IMP SPEC: ABNORMAL
PATH REPORT.COMMENTS IMP SPEC: YES
PATH REPORT.COMMENTS IMP SPEC: YES
PATH REPORT.FINAL DX SPEC: ABNORMAL
PATH REPORT.GROSS SPEC: ABNORMAL
PATH REPORT.RELEVANT HX SPEC: ABNORMAL
PLAT MORPH BLD: ABNORMAL
PLATELET # BLD AUTO: 321 10E3/UL (ref 150–450)
POLYCHROMASIA BLD QL SMEAR: SLIGHT
POTASSIUM BLD-SCNC: 3.9 MMOL/L (ref 3.4–5.3)
PRODUCT NUMBER FLOW CYTOMETRY: NORMAL
PROMYELOCYTES # BLD MANUAL: 2.8 10E3/UL
PROMYELOCYTES NFR BLD MANUAL: 4 %
RBC # BLD AUTO: 3.84 10E6/UL (ref 4.4–5.9)
RBC MORPH BLD: ABNORMAL
SODIUM SERPL-SCNC: 139 MMOL/L (ref 133–144)
VIABLE CD34 CELLS NFR FLD: 98.62 %
WBC # BLD AUTO: 69.5 10E3/UL (ref 4–11)

## 2021-11-04 PROCEDURE — 80048 BASIC METABOLIC PNL TOTAL CA: CPT

## 2021-11-04 PROCEDURE — 250N000009 HC RX 250: Performed by: STUDENT IN AN ORGANIZED HEALTH CARE EDUCATION/TRAINING PROGRAM

## 2021-11-04 PROCEDURE — 250N000011 HC RX IP 250 OP 636: Performed by: STUDENT IN AN ORGANIZED HEALTH CARE EDUCATION/TRAINING PROGRAM

## 2021-11-04 PROCEDURE — 85027 COMPLETE CBC AUTOMATED: CPT | Performed by: STUDENT IN AN ORGANIZED HEALTH CARE EDUCATION/TRAINING PROGRAM

## 2021-11-04 PROCEDURE — 36592 COLLECT BLOOD FROM PICC: CPT

## 2021-11-04 PROCEDURE — 99214 OFFICE O/P EST MOD 30 MIN: CPT

## 2021-11-04 PROCEDURE — 38206 HARVEST AUTO STEM CELLS: CPT

## 2021-11-04 PROCEDURE — 250N000011 HC RX IP 250 OP 636: Performed by: INTERNAL MEDICINE

## 2021-11-04 PROCEDURE — 250N000013 HC RX MED GY IP 250 OP 250 PS 637: Performed by: PATHOLOGY

## 2021-11-04 PROCEDURE — 96372 THER/PROPH/DIAG INJ SC/IM: CPT | Mod: XS | Performed by: INTERNAL MEDICINE

## 2021-11-04 PROCEDURE — 86367 STEM CELLS TOTAL COUNT: CPT

## 2021-11-04 PROCEDURE — 83735 ASSAY OF MAGNESIUM: CPT

## 2021-11-04 RX ORDER — ACETAMINOPHEN 325 MG/1
650 TABLET ORAL ONCE
Status: COMPLETED | OUTPATIENT
Start: 2021-11-04 | End: 2021-11-04

## 2021-11-04 RX ORDER — HEPARIN SODIUM (PORCINE) LOCK FLUSH IV SOLN 100 UNIT/ML 100 UNIT/ML
3 SOLUTION INTRAVENOUS ONCE
Status: CANCELLED | OUTPATIENT
Start: 2021-11-04 | End: 2021-11-04

## 2021-11-04 RX ORDER — HEPARIN SODIUM,PORCINE 10 UNIT/ML
5 VIAL (ML) INTRAVENOUS
Status: CANCELLED | OUTPATIENT
Start: 2021-11-05

## 2021-11-04 RX ORDER — HEPARIN SODIUM (PORCINE) LOCK FLUSH IV SOLN 100 UNIT/ML 100 UNIT/ML
3 SOLUTION INTRAVENOUS ONCE
Status: COMPLETED | OUTPATIENT
Start: 2021-11-04 | End: 2021-11-04

## 2021-11-04 RX ORDER — HEPARIN SODIUM (PORCINE) LOCK FLUSH IV SOLN 100 UNIT/ML 100 UNIT/ML
5 SOLUTION INTRAVENOUS
Status: CANCELLED | OUTPATIENT
Start: 2021-11-05

## 2021-11-04 RX ADMIN — FILGRASTIM 780 MCG: 480 INJECTION, SOLUTION INTRAVENOUS; SUBCUTANEOUS at 09:08

## 2021-11-04 RX ADMIN — Medication 3 ML: at 13:54

## 2021-11-04 RX ADMIN — ACETAMINOPHEN 650 MG: 325 TABLET ORAL at 09:37

## 2021-11-04 RX ADMIN — ANTICOAGULANT CITRATE DEXTROSE SOLUTION FORMULA A 1620 ML: 12.25; 11; 3.65 SOLUTION INTRAVENOUS at 09:03

## 2021-11-04 RX ADMIN — CALCIUM GLUCONATE 1488 MG/HR: 94 INJECTION, SOLUTION INTRAVENOUS at 09:03

## 2021-11-04 NOTE — RESULT ENCOUNTER NOTE
Called and reviewed FNA results with Mr. Ronquillo by phone. Cytology positive for papillary thyroid carcinoma. Will review findings with oncology team to make plans for care.  Makeda Elder MD 11/4/2021 3:48 PM

## 2021-11-04 NOTE — LETTER
Date:December 6, 2021      Provider requested that no letter be sent. Do not send.       [facility]

## 2021-11-04 NOTE — DISCHARGE INSTRUCTIONS
Autologous HPC/MNC Collection:   In most cases, the cell dose report will be available tomorrow morning.   The Bone Marrow Transplant (BMT) clinic staff looks at your report, and a decision is made if you will need another collection.   Remember it is important to follow a low fat diet during the collection process. Sometimes following the procedure, your blood platelet count may be low.  If you are told your platelet count is low, you need to avoid taking aspirin/aspirin containing products and avoid heavy physical activity and activities that may result in bruising or traumatic injury.  To contact the BMT fellow or attending physician after 5 p.m. call [phone].

## 2021-11-04 NOTE — PROCEDURES
Laboratory Medicine and Pathology  Transfusion Medicine Consultation/Procedure      June Ronquillo MRN# [id number]   YOB: 1970 Age: 51 year old          Assessment and Plan:   June Ronquillo is a 51 year old male with history of early coronary artery disease and with recent diagnosis of high risk multiple myeloma who was referred for autologous collection; currently on day 1 of collection. Risks and benefits previously explained. He had all of his questions answered and consented to the procedure.    BMT to follow and will collect per their request.       Subjective   Patient states bone/back pain 8 or 9 out of 10, drops to a 5 or 6 with Tylenol 650 mg. 2 year old grandchild jumped on his chest and he felt a pop with his line; line checked out ok per radiology. Some blood tinge around the line entry site. Apheresis nurse will change dressing/clean up site.        Past Medical History:      Past Medical History        Past Medical History:   Diagnosis Date     CAD (coronary artery disease)       s/p stent to CFX     Heart attack (H)       Hyperlipidemia LDL goal <100       Hypertension       Stented coronary artery       Thyroid disease                   Past Surgical History:      Past Surgical History         Past Surgical History:   Procedure Laterality Date     BONE MARROW BIOPSY, BONE SPECIMEN, NEEDLE/TROCAR Left 5/17/2021     Procedure: BIOPSY, BONE MARROW with aspiration and ancillary studies;  Surgeon: Niharika Regalado MD;  Location:  OR     BONE MARROW BIOPSY, BONE SPECIMEN, NEEDLE/TROCAR Left 10/14/2021     Procedure: BIOPSY, BONE MARROW;  Surgeon: Alexa Albert APRN CNP;  [place] [facility] [place]     HEART CATH PTCA SINGLE VESSEL   10/10/2004     Stent to CFX - [facility]                   Social History:      Social History           Tobacco Use     Smoking status: Never Smoker     Smokeless tobacco: Never Used   Substance Use Topics     Alcohol use: Not  Currently              Allergies:            Allergies   Allergen Reactions     Blood Transfusion Related (Informational Only) Other (See Comments)       Patient has a history of a clinically significant antibody against RBC antigens.  A delay in compatible RBCs may occur.      Nkda [No Known Drug Allergies]                Medications:           Current Outpatient Medications   Medication Sig     acyclovir (ZOVIRAX) 400 MG tablet Take 1 tablet (400 mg) by mouth 2 times daily     aspirin (ASA) 325 MG tablet Take 1 tablet (325 mg) by mouth daily (Patient not taking: Reported on 10/13/2021)     calcium carb-cholecalciferol (OS-HARDEEP) 500-200 MG-UNIT tablet Take 1 tablet by mouth daily     dexamethasone (DECADRON) 4 MG tablet Take 20 mg (four tabs) by mouth on Day 2     FISH OIL 1000 MG OR CAPS 1 bid (Patient not taking: No sig reported)     hydrochlorothiazide (HYDRODIURIL) 50 MG tablet Take 1 tablet (50 mg) by mouth daily     LENalidomide (REVLIMID) 25 MG CAPS capsule Take 1 capsule (25 mg) by mouth daily for 14 days     levofloxacin (LEVAQUIN) 250 MG tablet Take 1 tablet (250 mg) by mouth daily     levothyroxine (SYNTHROID/LEVOTHROID) 50 MCG tablet Take 1 tablet (50 mcg) by mouth daily     losartan (COZAAR) 50 MG tablet Take 1 tablet (50 mg) by mouth daily     order for DME Rib belt     pantoprazole (PROTONIX) 20 MG EC tablet Take 1 tablet (20 mg) by mouth daily     potassium chloride ER (KLOR-CON M) 10 MEQ CR tablet Take 2 tablets (20 mEq) by mouth daily     prochlorperazine (COMPAZINE) 10 MG tablet Take 1 tablet (10 mg) by mouth every 6 hours as needed (Nausea/Vomiting)     rosuvastatin (CRESTOR) 40 MG tablet TAKE ONE TABLET BY MOUTH ONCE DAILY. MUST SCHEDULE APPOINTMENT FOR ANNUAL FOLLOW UP FOR FURTHER REFILLS      No current facility-administered medications for this encounter.                 Abbreviated Physical Exam:   BP (!) 140/83   Pulse 90   Temp 98.7  F (37.1  C) (Oral)   Resp 16   Wt 74.8 kg (164 lb  14.5 oz)   BMI 27.44 kg/m      Patient Alert & Oriented and in No Acute Distress    See above for line site          Laboratory Data:     ROUTINE IP LABS (Last four results)  BMPRecent Labs   Lab 11/04/21  0906      POTASSIUM 3.9   CHLORIDE 108   HARDEEP 9.0   CO2 24   BUN 9   CR 0.84   *     CBC  Recent Labs   Lab 11/04/21  0906 11/03/21  1000   WBC 69.5* 55.4*   RBC 3.84* 3.84*   HGB 12.0* 12.1*   HCT 37.3* 36.2*   MCV 97 94   MCH 31.3 31.5   MCHC 32.2 33.4   RDW 15.4* 15.2*    329        Attestation:  This patient has been seen and evaluated by me, Renee Sharma.      Renee Sharma M.D.  Adjunct   Pager: [phone]

## 2021-11-04 NOTE — LETTER
11/4/2021         RE: June Ronquillo  [address]  [facility] [address]        Dear Colleague,    Thank you for referring your patient, June Ronquillo, to the [facility]ANS[facility] [place]. Please see a copy of my visit note below.    BMT Clinic Note      Mr. Ronquillo is a 52 yo man with MM IgG lambda here to continue GCSF priming in preparation for stem cell collections & auto PBSCT. Day 1 of collection today.       HPI: Patient is tolerating the G-CSF but with bony pain. He is taking claritin and tylenol. No fevers. No bleeding. He did come and get CXR and U/S after granddaughter jumped on him. Eval of line okay and no ne issues with it today and no further bleeding.     Review of Systems: 8 point ROS negative except as noted above.     Physical Exam:   Vitals reviewed in apheresis.   General: NAD   Eyes: CESAR, sclera anicteric   Lungs: CTA bilaterally  Cardiovascular: RRR, no M/R/G   Lymphatics: no edema  Skin: no rashes or petechaie  Neuro: A&O   [place]: Some dried blood under dressing and disc. No active bleeding/bruising.     I have assessed all abnormal lab values for their clinical significance and any values considered clinically significant have been addressed in the assessment and plan.      Mr. Ronquillo is a 52 yo man with MM IgG lambda here to continue GCSF priming in preparation for stem cell collections & auto PBSCT.  1.  BMT: GCSF daily through collections. Goal is 5x10^6- one collection due to high risk cytogenetics  - taking claritin and tylenol for bone pain  - day 1 of collection today. CD34 looks great 84!      2. Heme:  -leukocytosis secondary to growth factor  Transfuse at 7 and 10  -continue aspirin for now, consider stopping fish oil when platelets fall     3.  ID:  remains on acyclovir    4. CV:    -Continue cozaar and hydrochlorothiazide for hypertension  -on crestor    5. FEN:  On K 20 meq daily    6. Neuro:  Neuropathy,  bilateral feet, started gabapentin 300 mg po bid.      6. Endocrine: remains on levothyhroxine       RTC tomorrow for possible day 2 collections but may be done after today.    30 minutes spent on the date of the encounter doing chart review, review of test results, interpretation of tests, patient visit and documentation . Coodinating with patient and apheresis.             Again, thank you for allowing me to participate in the care of your patient.        Sincerely,        BMT Advanced Practice Provider

## 2021-11-04 NOTE — PROGRESS NOTES
BMT Clinic Note      Mr. Ronquillo is a 50 yo man with MM IgG lambda here to continue GCSF priming in preparation for stem cell collections & auto PBSCT. Day 1 of collection today.       HPI: Patient is tolerating the G-CSF but with bony pain. He is taking claritin and tylenol. No fevers. No bleeding. He did come and get CXR and U/S after granddaughter jumped on him. Eval of line okay and no ne issues with it today and no further bleeding.     Review of Systems: 8 point ROS negative except as noted above.     Physical Exam:   Vitals reviewed in apheresis.   General: NAD   Eyes: CESAR, sclera anicteric   Lungs: CTA bilaterally  Cardiovascular: RRR, no M/R/G   Lymphatics: no edema  Skin: no rashes or petechaie  Neuro: A&O   [place]: Some dried blood under dressing and disc. No active bleeding/bruising.     I have assessed all abnormal lab values for their clinical significance and any values considered clinically significant have been addressed in the assessment and plan.      Mr. Ronquillo is a 50 yo man with MM IgG lambda here to continue GCSF priming in preparation for stem cell collections & auto PBSCT.  1.  BMT: GCSF daily through collections. Goal is 5x10^6- one collection due to high risk cytogenetics  - taking claritin and tylenol for bone pain  - day 1 of collection today. CD34 looks great 84!      2. Heme:  -leukocytosis secondary to growth factor  Transfuse at 7 and 10  -continue aspirin for now, consider stopping fish oil when platelets fall     3.  ID:  remains on acyclovir    4. CV:    -Continue cozaar and hydrochlorothiazide for hypertension  -on crestor    5. FEN:  On K 20 meq daily    6. Neuro:  Neuropathy, bilateral feet, started gabapentin 300 mg po bid.      6. Endocrine: remains on levothyhroxine       RTC tomorrow for possible day 2 collections but may be done after today.    30 minutes spent on the date of the encounter doing chart review, review of test results, interpretation of tests, patient  visit and documentation . Coodinating with patient and apheresis.

## 2021-11-05 ENCOUNTER — ONCOLOGY VISIT (OUTPATIENT)
Dept: TRANSPLANT | Facility: CLINIC | Age: 51
End: 2021-11-05
Attending: PHYSICIAN ASSISTANT
Payer: COMMERCIAL

## 2021-11-05 ENCOUNTER — DOCUMENTATION ONLY (OUTPATIENT)
Dept: ONCOLOGY | Facility: CLINIC | Age: 51
End: 2021-11-05

## 2021-11-05 ENCOUNTER — APPOINTMENT (OUTPATIENT)
Dept: LAB | Facility: CLINIC | Age: 51
End: 2021-11-05
Payer: COMMERCIAL

## 2021-11-05 VITALS
HEART RATE: 99 BPM | WEIGHT: 168.4 LBS | RESPIRATION RATE: 16 BRPM | SYSTOLIC BLOOD PRESSURE: 142 MMHG | TEMPERATURE: 98 F | DIASTOLIC BLOOD PRESSURE: 89 MMHG | BODY MASS INDEX: 28.02 KG/M2 | OXYGEN SATURATION: 97 %

## 2021-11-05 DIAGNOSIS — C90.00 MULTIPLE MYELOMA NOT HAVING ACHIEVED REMISSION (H): Primary | ICD-10-CM

## 2021-11-05 DIAGNOSIS — C90.01 MULTIPLE MYELOMA IN REMISSION (H): ICD-10-CM

## 2021-11-05 DIAGNOSIS — C90.00 MULTIPLE MYELOMA NOT HAVING ACHIEVED REMISSION (H): ICD-10-CM

## 2021-11-05 LAB
ANION GAP SERPL CALCULATED.3IONS-SCNC: 8 MMOL/L (ref 3–14)
BASOPHILS # BLD MANUAL: 0 10E3/UL (ref 0–0.2)
BASOPHILS NFR BLD MANUAL: 0 %
BUN SERPL-MCNC: 10 MG/DL (ref 7–30)
CA-I BLD-MCNC: 4.9 MG/DL (ref 4.4–5.2)
CALCIUM SERPL-MCNC: 9.6 MG/DL (ref 8.5–10.1)
CHLORIDE BLD-SCNC: 103 MMOL/L (ref 94–109)
CO2 SERPL-SCNC: 27 MMOL/L (ref 20–32)
CREAT SERPL-MCNC: 0.93 MG/DL (ref 0.66–1.25)
EOSINOPHIL # BLD MANUAL: 2.1 10E3/UL (ref 0–0.7)
EOSINOPHIL NFR BLD MANUAL: 3 %
ERYTHROCYTE [DISTWIDTH] IN BLOOD BY AUTOMATED COUNT: 15.1 % (ref 10–15)
GFR SERPL CREATININE-BSD FRML MDRD: >90 ML/MIN/1.73M2
GLUCOSE BLD-MCNC: 144 MG/DL (ref 70–99)
HCT VFR BLD AUTO: 36.5 % (ref 40–53)
HGB BLD-MCNC: 11.8 G/DL (ref 13.3–17.7)
LYMPHOCYTES # BLD MANUAL: 0.7 10E3/UL (ref 0.8–5.3)
LYMPHOCYTES NFR BLD MANUAL: 1 %
MAGNESIUM SERPL-MCNC: 1.4 MG/DL (ref 1.6–2.3)
MCH RBC QN AUTO: 30.6 PG (ref 26.5–33)
MCHC RBC AUTO-ENTMCNC: 32.3 G/DL (ref 31.5–36.5)
MCV RBC AUTO: 95 FL (ref 78–100)
METAMYELOCYTES # BLD MANUAL: 0.7 10E3/UL
METAMYELOCYTES NFR BLD MANUAL: 1 %
MONOCYTES # BLD MANUAL: 2.8 10E3/UL (ref 0–1.3)
MONOCYTES NFR BLD MANUAL: 4 %
MYELOCYTES # BLD MANUAL: 2.1 10E3/UL
MYELOCYTES NFR BLD MANUAL: 3 %
NEUTROPHILS # BLD MANUAL: 62.3 10E3/UL (ref 1.6–8.3)
NEUTROPHILS NFR BLD MANUAL: 88 %
PLAT MORPH BLD: ABNORMAL
PLATELET # BLD AUTO: 129 10E3/UL (ref 150–450)
POTASSIUM BLD-SCNC: 3.8 MMOL/L (ref 3.4–5.3)
RBC # BLD AUTO: 3.85 10E6/UL (ref 4.4–5.9)
RBC MORPH BLD: ABNORMAL
SODIUM SERPL-SCNC: 138 MMOL/L (ref 133–144)
WBC # BLD AUTO: 70.8 10E3/UL (ref 4–11)

## 2021-11-05 PROCEDURE — 99213 OFFICE O/P EST LOW 20 MIN: CPT

## 2021-11-05 PROCEDURE — 80048 BASIC METABOLIC PNL TOTAL CA: CPT

## 2021-11-05 PROCEDURE — G0463 HOSPITAL OUTPT CLINIC VISIT: HCPCS

## 2021-11-05 PROCEDURE — 85014 HEMATOCRIT: CPT

## 2021-11-05 PROCEDURE — 36592 COLLECT BLOOD FROM PICC: CPT

## 2021-11-05 PROCEDURE — 82330 ASSAY OF CALCIUM: CPT

## 2021-11-05 PROCEDURE — 83735 ASSAY OF MAGNESIUM: CPT

## 2021-11-05 RX ORDER — DEXTROSE MONOHYDRATE 50 MG/ML
10-20 INJECTION, SOLUTION INTRAVENOUS
Status: CANCELLED | OUTPATIENT
Start: 2021-11-16

## 2021-11-05 RX ORDER — DIPHENHYDRAMINE HCL 25 MG
25 CAPSULE ORAL ONCE
Status: CANCELLED
Start: 2021-11-11 | End: 2021-11-11

## 2021-11-05 RX ORDER — ONDANSETRON 8 MG/1
8 TABLET, FILM COATED ORAL EVERY 8 HOURS
Status: CANCELLED
Start: 2021-11-10

## 2021-11-05 RX ORDER — DEXAMETHASONE 4 MG/1
8 TABLET ORAL DAILY
Status: CANCELLED
Start: 2021-11-11

## 2021-11-05 RX ORDER — HEPARIN SODIUM,PORCINE 10 UNIT/ML
5 VIAL (ML) INTRAVENOUS ONCE
Status: DISCONTINUED | OUTPATIENT
Start: 2021-11-05 | End: 2021-11-05 | Stop reason: HOSPADM

## 2021-11-05 RX ORDER — MEPERIDINE HYDROCHLORIDE 25 MG/ML
25-50 INJECTION INTRAMUSCULAR; INTRAVENOUS; SUBCUTANEOUS
Status: CANCELLED | OUTPATIENT
Start: 2021-11-11 | End: 2021-11-12

## 2021-11-05 RX ORDER — ALLOPURINOL 300 MG/1
300 TABLET ORAL ONCE
Status: CANCELLED
Start: 2021-11-10 | End: 2021-11-10

## 2021-11-05 RX ORDER — ACETAMINOPHEN 325 MG/1
650 TABLET ORAL ONCE
Status: CANCELLED
Start: 2021-11-11 | End: 2021-11-11

## 2021-11-05 ASSESSMENT — PAIN SCALES - GENERAL: PAINLEVEL: MILD PAIN (2)

## 2021-11-05 NOTE — PROGRESS NOTES
Inpatient Admission Information:      Admit Date:  Wednesday 11/10 @ 630 am   Diagnosis:  MM   Transplant Type:  Auto D-1   Protocol:  MT 2016-35      Notes:  -       New Eval Work-Up   MD Du King   Date 8/13/2021 10/20/21         Consult Type Date   1 Endo 10/25/21   2 Cards 10/18/2021   3 - -         Long Term Follow-Up   MD Du King      Notified patient of admission to hospital on Wed 11/10 for planned autologous stem cell transplant.  He understands to check-in at hospital Admissions between 630 am.   He  verbalized that he has been feeling well, no symptoms of concern at this time.  Patient has no questions or concerns.

## 2021-11-05 NOTE — NURSING NOTE
Chief Complaint   Patient presents with     Blood Draw     Labs drawn in lab by RN from [facility]. VS taken.      Labs collected from [facility] by RN, line flushed with saline and heparin.  Vitals taken. Pt checked in for appointment(s).  Braulio Lindsay RN

## 2021-11-05 NOTE — NURSING NOTE
"Oncology Rooming Note    November 5, 2021 9:41 AM   June Ronquillo is a 51 year old male who presents for:    Chief Complaint   Patient presents with     Blood Draw     Labs drawn in lab by RN from [facility]. VS taken.      Oncology Clinic Visit     multiple myeloma not having achieved remission     Initial Vitals: BP (!) 142/89 (BP Location: Right arm, Patient Position: Sitting, Cuff Size: Adult Regular)   Pulse 99   Temp 98  F (36.7  C) (Oral)   Resp 16   Wt 76.4 kg (168 lb 6.4 oz)   SpO2 97%   BMI 28.02 kg/m   Estimated body mass index is 28.02 kg/m  as calculated from the following:    Height as of 11/3/21: 1.651 m (5' 5\").    Weight as of this encounter: 76.4 kg (168 lb 6.4 oz). Body surface area is 1.87 meters squared.  Mild Pain (2) Comment: Data Unavailable   No LMP for male patient.  Allergies reviewed: Yes  Medications reviewed: Yes    Medications: Medication refills not needed today.  Pharmacy name entered into Dada:    [facility] [place], [place] - [address]  [facility] [place], [place] - [address]    Clinical concerns: still has foot pain, but it has improved        Kathy Xavier CMA            "

## 2021-11-05 NOTE — LETTER
11/5/2021         RE: June Ronquillo  [address] [address]        Dear Colleague,    Thank you for referring your patient, June Ronquillo, to the [facility]LOOD AND [facility]ANSPL[facility] PRO[facility] [place]. Please see a copy of my visit note below.    BMT Clinic Note      Mr. Ronquillo is a 52 yo man with MM IgG lambda. S/p GCSF priming in preparation for stem cell collections & auto PBSCT.     HPI: Completed collections in 1 day. Mild residual bone pain but this is overall improved. No other acute medical complaints. Excited to be done with collections.      Review of Systems: 6 point ROS negative except as noted above.     Physical Exam:   Vitals reviewed in flowsheets.     General: NAD   Eyes: CESAR, sclera anicteric   Lungs: CTA bilaterally  Cardiovascular: RRR, no M/R/G   Lymphatics: no edema  Skin: no rashes or petechaie  Neuro: A&O   Central line: dressing c/d/i.     Lab Results   Component Value Date    WBC 70.8 (HH) 11/05/2021    ANEU 62.3 (H) 11/05/2021    HGB 11.8 (L) 11/05/2021    HCT 36.5 (L) 11/05/2021     (L) 11/05/2021     11/05/2021    POTASSIUM 3.8 11/05/2021    CHLORIDE 103 11/05/2021    CO2 27 11/05/2021     (H) 11/05/2021    BUN 10 11/05/2021    CR 0.93 11/05/2021    MAG 1.4 (L) 11/05/2021    INR 0.95 10/14/2021    BILITOTAL 0.5 10/20/2021    AST 18 10/20/2021    ALT 17 10/20/2021    ALKPHOS 64 10/20/2021    PROTTOTAL 7.0 10/20/2021    ALBUMIN 4.2 10/20/2021       I have assessed all abnormal lab values for their clinical significance and any values considered clinically significant have been addressed in the assessment and plan.         Mr. Ronquillo is a 52 yo man with MM IgG lambda here to continue GCSF priming in preparation for stem cell collections & auto PBSCT.  1.  BMT: GCSF daily through collections. Goal is 5x10^6- one collection due to high risk cytogenetics  - taking claritin and tylenol for bone pain  - collected 6.99 x10(6)  CD34/KG. Collections complete.      2. Heme:  -leukocytosis secondary to growth factor  Transfuse at 7 and 10  -continue aspirin for now, consider stopping fish oil when platelets fall     3.  ID:  remains on acyclovir    4. CV:    -Continue cozaar and hydrochlorothiazide for hypertension  -on crestor    5. FEN:    Cr stable  On K 20 meq daily  Hypomagnesia: secondary to collections, anticipate it will improve on its own as pt is eating and drinking normally.     6. Neuro:  Neuropathy, bilateral feet, started gabapentin 300 mg po bid.      7. Endocrine: remains on levothyhroxine    8. MSK: had been receiving zometa. Due for next dose 11/9, anticipate this will be cancelled pending BMT admission for transplant.      Messaged JOSE King regarding completion of collections. Pt will perform line flushes at home, has supplies. Will notify BMT clinic if things change.     25 minutes spent on the date of the encounter doing chart review, review of test results, interpretation of tests, patient visit and documentation . Coodinating with patient and apheresis.     Shukri Moffett PA-C  [phone]

## 2021-11-05 NOTE — PROGRESS NOTES
BMT Clinic Note      Mr. Ronquillo is a 52 yo man with MM IgG lambda. S/p GCSF priming in preparation for stem cell collections & auto PBSCT.     HPI: Completed collections in 1 day. Mild residual bone pain but this is overall improved. No other acute medical complaints. Excited to be done with collections.      Review of Systems: 6 point ROS negative except as noted above.     Physical Exam:   Vitals reviewed in flowsheets.     General: NAD   Eyes: CESAR, sclera anicteric   Lungs: CTA bilaterally  Cardiovascular: RRR, no M/R/G   Lymphatics: no edema  Skin: no rashes or petechaie  Neuro: A&O   Central line: dressing c/d/i.     Lab Results   Component Value Date    WBC 70.8 (HH) 11/05/2021    ANEU 62.3 (H) 11/05/2021    HGB 11.8 (L) 11/05/2021    HCT 36.5 (L) 11/05/2021     (L) 11/05/2021     11/05/2021    POTASSIUM 3.8 11/05/2021    CHLORIDE 103 11/05/2021    CO2 27 11/05/2021     (H) 11/05/2021    BUN 10 11/05/2021    CR 0.93 11/05/2021    MAG 1.4 (L) 11/05/2021    INR 0.95 10/14/2021    BILITOTAL 0.5 10/20/2021    AST 18 10/20/2021    ALT 17 10/20/2021    ALKPHOS 64 10/20/2021    PROTTOTAL 7.0 10/20/2021    ALBUMIN 4.2 10/20/2021       I have assessed all abnormal lab values for their clinical significance and any values considered clinically significant have been addressed in the assessment and plan.         Mr. Ronquillo is a 52 yo man with MM IgG lambda here to continue GCSF priming in preparation for stem cell collections & auto PBSCT.  1.  BMT: GCSF daily through collections. Goal is 5x10^6- one collection due to high risk cytogenetics  - taking claritin and tylenol for bone pain  - collected 6.99 x10(6) CD34/KG. Collections complete.      2. Heme:  -leukocytosis secondary to growth factor  Transfuse at 7 and 10  -continue aspirin for now, consider stopping fish oil when platelets fall     3.  ID:  remains on acyclovir    4. CV:    -Continue cozaar and hydrochlorothiazide for hypertension  -on  crestor    5. FEN:    Cr stable  On K 20 meq daily  Hypomagnesia: secondary to collections, anticipate it will improve on its own as pt is eating and drinking normally.     6. Neuro:  Neuropathy, bilateral feet, started gabapentin 300 mg po bid.      7. Endocrine: remains on levothyhroxine    8. MSK: had been receiving zometa. Due for next dose 11/9, anticipate this will be cancelled pending BMT admission for transplant.      Messaged JOSE King regarding completion of collections. Pt will perform line flushes at home, has supplies. Will notify BMT clinic if things change.     25 minutes spent on the date of the encounter doing chart review, review of test results, interpretation of tests, patient visit and documentation . Coodinating with patient and apheresis.     Shukri Moffett PA-C  [phone]

## 2021-11-05 NOTE — PROGRESS NOTES
[facility] Oral Chemotherapy Monitoring Program    Thank you for the opportunity to be a part in the care of this patient's oral chemotherapy. The oncology pharmacy will no longer be following this patient for oral chemotherapy. If there are any questions or the plan changes, feel free to contact us.    ORAL CHEMOTHERAPY 6/29/2021 7/19/2021 7/24/2021 8/21/2021 8/31/2021 9/20/2021 11/5/2021   Assessment Type Refill Refill Chart Review Chart Review Refill Refill Discontinuation   Stop Date - - - - - - 10/20/2021   Reason for Discontinuation - - - - - - Disease progression   Diagnosis Code Multiple Myeloma Multiple Myeloma Multiple Myeloma Multiple Myeloma Multiple Myeloma Multiple Myeloma Multiple Myeloma   Providers Dr. George Vazquez   [facility]   Drug Name Revlimid (lenalidomide) Revlimid (lenalidomide) Revlimid (lenalidomide) Revlimid (lenalidomide) Revlimid (lenalidomide) Revlimid (lenalidomide) Revlimid (lenalidomide)   Dose 25 mg 25 mg 25 mg 25 mg 25 mg 25 mg 25 mg   Current Schedule Daily Daily Daily Daily Daily Daily Daily   Cycle Details 2 weeks on, 1 week off 2 weeks on, 1 week off 2 weeks on, 1 week off 2 weeks on, 1 week off 2 weeks on, 1 week off 2 weeks on, 1 week off 2 weeks on, 1 week off   Start Date of Last Cycle - - - - - - -   Planned next cycle start date - - - - - - -   Doses missed in last 2 weeks - - - - - - -   Adherence Assessment - - - - - - -   Adverse Effects - - - - - - -   Any new drug interactions? - - - - - - -   Is the dose as ordered appropriate for the patient? - - - - - - -   Since the last time we talked, have you been hospitalized or used the emergency room? - - - - - - -       Camila Carmichael, PharmD, BCACP  Oral Chemotherapy Monitoring Program  [facility]  [phone]  November 5, 2021

## 2021-11-08 ENCOUNTER — LAB (OUTPATIENT)
Dept: LAB | Facility: CLINIC | Age: 51
End: 2021-11-08
Attending: INTERNAL MEDICINE
Payer: COMMERCIAL

## 2021-11-08 ENCOUNTER — ANCILLARY PROCEDURE (OUTPATIENT)
Dept: ULTRASOUND IMAGING | Facility: CLINIC | Age: 51
End: 2021-11-08
Attending: INTERNAL MEDICINE
Payer: COMMERCIAL

## 2021-11-08 DIAGNOSIS — C90.00 MULTIPLE MYELOMA NOT HAVING ACHIEVED REMISSION (H): Primary | ICD-10-CM

## 2021-11-08 DIAGNOSIS — C90.01 MULTIPLE MYELOMA IN REMISSION (H): ICD-10-CM

## 2021-11-08 DIAGNOSIS — E04.1 THYROID NODULE: ICD-10-CM

## 2021-11-08 LAB — SARS-COV-2 RNA RESP QL NAA+PROBE: NEGATIVE

## 2021-11-08 PROCEDURE — U0005 INFEC AGEN DETEC AMPLI PROBE: HCPCS | Mod: 90 | Performed by: PATHOLOGY

## 2021-11-08 PROCEDURE — 76536 US EXAM OF HEAD AND NECK: CPT | Mod: GC | Performed by: RADIOLOGY

## 2021-11-08 PROCEDURE — U0003 INFECTIOUS AGENT DETECTION BY NUCLEIC ACID (DNA OR RNA); SEVERE ACUTE RESPIRATORY SYNDROME CORONAVIRUS 2 (SARS-COV-2) (CORONAVIRUS DISEASE [COVID-19]), AMPLIFIED PROBE TECHNIQUE, MAKING USE OF HIGH THROUGHPUT TECHNOLOGIES AS DESCRIBED BY CMS-2020-01-R: HCPCS | Mod: 90 | Performed by: PATHOLOGY

## 2021-11-09 NOTE — PROGRESS NOTES
This is a recent snapshot of the patient's [facility] medical record.  For current drug dose and complete information and questions, call [phone] or In Basket pool, [facility] home infusion ([id number])  CSN Number:  [id number]

## 2021-11-10 ENCOUNTER — HOSPITAL ENCOUNTER (INPATIENT)
Facility: CLINIC | Age: 51
LOS: 2 days | Discharge: HOME | DRG: 016 | End: 2021-11-12
Attending: INTERNAL MEDICINE | Admitting: INTERNAL MEDICINE
Payer: COMMERCIAL

## 2021-11-10 DIAGNOSIS — C90.00 MULTIPLE MYELOMA NOT HAVING ACHIEVED REMISSION (H): Primary | ICD-10-CM

## 2021-11-10 DIAGNOSIS — K30 STOMACH UPSET: ICD-10-CM

## 2021-11-10 LAB
ABO/RH(D): ABNORMAL
ALBUMIN SERPL-MCNC: 3.6 G/DL (ref 3.4–5)
ALP SERPL-CCNC: 107 U/L (ref 40–150)
ALT SERPL W P-5'-P-CCNC: 19 U/L (ref 0–70)
ANION GAP SERPL CALCULATED.3IONS-SCNC: 7 MMOL/L (ref 3–14)
ANTIBODY SCREEN, TUBE: NORMAL
ANTIBODY SCREEN: POSITIVE
APTT PPP: 28 SECONDS (ref 22–38)
AST SERPL W P-5'-P-CCNC: 10 U/L (ref 0–45)
BASOPHILS # BLD AUTO: 0 10E3/UL (ref 0–0.2)
BASOPHILS NFR BLD AUTO: 0 %
BILIRUB SERPL-MCNC: 0.3 MG/DL (ref 0.2–1.3)
BUN SERPL-MCNC: 12 MG/DL (ref 7–30)
CALCIUM SERPL-MCNC: 8.8 MG/DL (ref 8.5–10.1)
CHLORIDE BLD-SCNC: 104 MMOL/L (ref 94–109)
CO2 SERPL-SCNC: 27 MMOL/L (ref 20–32)
CREAT SERPL-MCNC: 0.89 MG/DL (ref 0.66–1.25)
EOSINOPHIL # BLD AUTO: 0.2 10E3/UL (ref 0–0.7)
EOSINOPHIL NFR BLD AUTO: 4 %
ERYTHROCYTE [DISTWIDTH] IN BLOOD BY AUTOMATED COUNT: 14.5 % (ref 10–15)
GFR SERPL CREATININE-BSD FRML MDRD: >90 ML/MIN/1.73M2
GLUCOSE BLD-MCNC: 101 MG/DL (ref 70–99)
HCT VFR BLD AUTO: 36.5 % (ref 40–53)
HGB BLD-MCNC: 12 G/DL (ref 13.3–17.7)
IMM GRANULOCYTES # BLD: 0 10E3/UL
IMM GRANULOCYTES NFR BLD: 1 %
INR PPP: 0.95 (ref 0.85–1.15)
LYMPHOCYTES # BLD AUTO: 1.4 10E3/UL (ref 0.8–5.3)
LYMPHOCYTES NFR BLD AUTO: 24 %
MAGNESIUM SERPL-MCNC: 2.1 MG/DL (ref 1.6–2.3)
MCH RBC QN AUTO: 30.5 PG (ref 26.5–33)
MCHC RBC AUTO-ENTMCNC: 32.9 G/DL (ref 31.5–36.5)
MCV RBC AUTO: 93 FL (ref 78–100)
MONOCYTES # BLD AUTO: 0.8 10E3/UL (ref 0–1.3)
MONOCYTES NFR BLD AUTO: 14 %
NEUTROPHILS # BLD AUTO: 3.3 10E3/UL (ref 1.6–8.3)
NEUTROPHILS NFR BLD AUTO: 57 %
NRBC # BLD AUTO: 0 10E3/UL
NRBC BLD AUTO-RTO: 0 /100
PLATELET # BLD AUTO: 195 10E3/UL (ref 150–450)
POTASSIUM BLD-SCNC: 3.8 MMOL/L (ref 3.4–5.3)
PROT SERPL-MCNC: 6.7 G/DL (ref 6.8–8.8)
RBC # BLD AUTO: 3.93 10E6/UL (ref 4.4–5.9)
SODIUM SERPL-SCNC: 138 MMOL/L (ref 133–144)
SPECIMEN EXPIRATION DATE: ABNORMAL
SPECIMEN EXPIRATION DATE: NORMAL
URATE SERPL-MCNC: 6 MG/DL (ref 3.5–7.2)
WBC # BLD AUTO: 5.7 10E3/UL (ref 4–11)

## 2021-11-10 PROCEDURE — 85049 AUTOMATED PLATELET COUNT: CPT | Performed by: PHYSICIAN ASSISTANT

## 2021-11-10 PROCEDURE — 250N000013 HC RX MED GY IP 250 OP 250 PS 637: Performed by: PHYSICIAN ASSISTANT

## 2021-11-10 PROCEDURE — 86900 BLOOD TYPING SEROLOGIC ABO: CPT | Performed by: PHYSICIAN ASSISTANT

## 2021-11-10 PROCEDURE — 85610 PROTHROMBIN TIME: CPT | Performed by: PHYSICIAN ASSISTANT

## 2021-11-10 PROCEDURE — 83735 ASSAY OF MAGNESIUM: CPT | Performed by: PHYSICIAN ASSISTANT

## 2021-11-10 PROCEDURE — 85730 THROMBOPLASTIN TIME PARTIAL: CPT | Performed by: PHYSICIAN ASSISTANT

## 2021-11-10 PROCEDURE — 80053 COMPREHEN METABOLIC PANEL: CPT | Performed by: PHYSICIAN ASSISTANT

## 2021-11-10 PROCEDURE — 250N000012 HC RX MED GY IP 250 OP 636 PS 637: Performed by: INTERNAL MEDICINE

## 2021-11-10 PROCEDURE — 250N000013 HC RX MED GY IP 250 OP 250 PS 637: Performed by: INTERNAL MEDICINE

## 2021-11-10 PROCEDURE — 86850 RBC ANTIBODY SCREEN: CPT | Performed by: PHYSICIAN ASSISTANT

## 2021-11-10 PROCEDURE — 84550 ASSAY OF BLOOD/URIC ACID: CPT | Performed by: PHYSICIAN ASSISTANT

## 2021-11-10 PROCEDURE — 3E04305 INTRODUCTION OF OTHER ANTINEOPLASTIC INTO CENTRAL VEIN, PERCUTANEOUS APPROACH: ICD-10-PCS | Performed by: INTERNAL MEDICINE

## 2021-11-10 PROCEDURE — 99223 1ST HOSP IP/OBS HIGH 75: CPT | Mod: AI | Performed by: INTERNAL MEDICINE

## 2021-11-10 PROCEDURE — 250N000011 HC RX IP 250 OP 636: Performed by: INTERNAL MEDICINE

## 2021-11-10 PROCEDURE — 258N000003 HC RX IP 258 OP 636: Performed by: INTERNAL MEDICINE

## 2021-11-10 PROCEDURE — 206N000001 HC R&B BMT UMMC

## 2021-11-10 RX ORDER — MEPERIDINE HYDROCHLORIDE 25 MG/ML
25-50 INJECTION INTRAMUSCULAR; INTRAVENOUS; SUBCUTANEOUS
Status: DISCONTINUED | OUTPATIENT
Start: 2021-11-11 | End: 2021-11-12 | Stop reason: HOSPADM

## 2021-11-10 RX ORDER — LOSARTAN POTASSIUM 50 MG/1
50 TABLET ORAL DAILY
Status: DISCONTINUED | OUTPATIENT
Start: 2021-11-10 | End: 2021-11-12 | Stop reason: HOSPADM

## 2021-11-10 RX ORDER — ONDANSETRON 8 MG/1
8 TABLET, FILM COATED ORAL EVERY 8 HOURS
Status: COMPLETED | OUTPATIENT
Start: 2021-11-10 | End: 2021-11-11

## 2021-11-10 RX ORDER — POTASSIUM CHLORIDE 750 MG/1
20 TABLET, EXTENDED RELEASE ORAL ONCE
Status: COMPLETED | OUTPATIENT
Start: 2021-11-10 | End: 2021-11-10

## 2021-11-10 RX ORDER — SULFAMETHOXAZOLE/TRIMETHOPRIM 800-160 MG
1 TABLET ORAL
Status: DISCONTINUED | OUTPATIENT
Start: 2021-12-13 | End: 2021-11-12 | Stop reason: HOSPADM

## 2021-11-10 RX ORDER — ACYCLOVIR 800 MG/1
800 TABLET ORAL 2 TIMES DAILY
Status: DISCONTINUED | OUTPATIENT
Start: 2021-11-10 | End: 2021-11-12 | Stop reason: HOSPADM

## 2021-11-10 RX ORDER — DEXTROSE MONOHYDRATE 50 MG/ML
10-20 INJECTION, SOLUTION INTRAVENOUS
Status: DISCONTINUED | OUTPATIENT
Start: 2021-11-16 | End: 2021-11-12 | Stop reason: HOSPADM

## 2021-11-10 RX ORDER — LORAZEPAM 0.5 MG/1
.5-1 TABLET ORAL EVERY 4 HOURS PRN
Status: DISCONTINUED | OUTPATIENT
Start: 2021-11-10 | End: 2021-11-12 | Stop reason: HOSPADM

## 2021-11-10 RX ORDER — GABAPENTIN 300 MG/1
300 CAPSULE ORAL 2 TIMES DAILY
Status: DISCONTINUED | OUTPATIENT
Start: 2021-11-10 | End: 2021-11-11

## 2021-11-10 RX ORDER — PANTOPRAZOLE SODIUM 40 MG/1
40 TABLET, DELAYED RELEASE ORAL DAILY
Status: DISCONTINUED | OUTPATIENT
Start: 2021-11-10 | End: 2021-11-12 | Stop reason: HOSPADM

## 2021-11-10 RX ORDER — DEXAMETHASONE 4 MG/1
8 TABLET ORAL DAILY
Status: COMPLETED | OUTPATIENT
Start: 2021-11-11 | End: 2021-11-12

## 2021-11-10 RX ORDER — ACETAMINOPHEN 325 MG/1
650 TABLET ORAL ONCE
Status: COMPLETED | OUTPATIENT
Start: 2021-11-11 | End: 2021-11-11

## 2021-11-10 RX ORDER — PROCHLORPERAZINE MALEATE 5 MG
5-10 TABLET ORAL EVERY 6 HOURS PRN
Status: DISCONTINUED | OUTPATIENT
Start: 2021-11-10 | End: 2021-11-12 | Stop reason: HOSPADM

## 2021-11-10 RX ORDER — DIPHENHYDRAMINE HCL 25 MG
25 CAPSULE ORAL ONCE
Status: COMPLETED | OUTPATIENT
Start: 2021-11-11 | End: 2021-11-11

## 2021-11-10 RX ORDER — ACETAMINOPHEN 325 MG/1
325-650 TABLET ORAL EVERY 4 HOURS PRN
Status: DISCONTINUED | OUTPATIENT
Start: 2021-11-10 | End: 2021-11-12 | Stop reason: HOSPADM

## 2021-11-10 RX ORDER — ALLOPURINOL 300 MG/1
300 TABLET ORAL ONCE
Status: COMPLETED | OUTPATIENT
Start: 2021-11-10 | End: 2021-11-10

## 2021-11-10 RX ORDER — POTASSIUM CHLORIDE 750 MG/1
20 TABLET, EXTENDED RELEASE ORAL DAILY
Status: DISCONTINUED | OUTPATIENT
Start: 2021-11-10 | End: 2021-11-12 | Stop reason: HOSPADM

## 2021-11-10 RX ORDER — LEVOFLOXACIN 250 MG/1
250 TABLET, FILM COATED ORAL
Status: DISCONTINUED | OUTPATIENT
Start: 2021-11-10 | End: 2021-11-12 | Stop reason: HOSPADM

## 2021-11-10 RX ORDER — LORAZEPAM 2 MG/ML
.5-1 INJECTION INTRAMUSCULAR EVERY 4 HOURS PRN
Status: DISCONTINUED | OUTPATIENT
Start: 2021-11-10 | End: 2021-11-12 | Stop reason: HOSPADM

## 2021-11-10 RX ORDER — FLUCONAZOLE 200 MG/1
200 TABLET ORAL DAILY
Status: DISCONTINUED | OUTPATIENT
Start: 2021-11-10 | End: 2021-11-12 | Stop reason: HOSPADM

## 2021-11-10 RX ORDER — LEVOTHYROXINE SODIUM 50 UG/1
50 TABLET ORAL DAILY
Status: DISCONTINUED | OUTPATIENT
Start: 2021-11-10 | End: 2021-11-12 | Stop reason: HOSPADM

## 2021-11-10 RX ADMIN — SODIUM CHLORIDE 1000 ML: 9 INJECTION, SOLUTION INTRAVENOUS at 11:04

## 2021-11-10 RX ADMIN — ACYCLOVIR 800 MG: 800 TABLET ORAL at 20:23

## 2021-11-10 RX ADMIN — ONDANSETRON HYDROCHLORIDE 8 MG: 8 TABLET, FILM COATED ORAL at 11:04

## 2021-11-10 RX ADMIN — GABAPENTIN 300 MG: 300 CAPSULE ORAL at 20:23

## 2021-11-10 RX ADMIN — POTASSIUM CHLORIDE 20 MEQ: 750 TABLET, EXTENDED RELEASE ORAL at 13:52

## 2021-11-10 RX ADMIN — ALLOPURINOL 300 MG: 300 TABLET ORAL at 11:04

## 2021-11-10 RX ADMIN — LEVOFLOXACIN 250 MG: 250 TABLET, FILM COATED ORAL at 13:52

## 2021-11-10 RX ADMIN — DEXAMETHASONE 10 MG: 2 TABLET ORAL at 11:04

## 2021-11-10 RX ADMIN — FLUCONAZOLE 200 MG: 200 TABLET ORAL at 13:52

## 2021-11-10 RX ADMIN — MELPHALAN HYDROCHLORIDE 374 MG: KIT INTRAVENOUS at 12:09

## 2021-11-10 RX ADMIN — ONDANSETRON HYDROCHLORIDE 8 MG: 8 TABLET, FILM COATED ORAL at 18:30

## 2021-11-10 RX ADMIN — PANTOPRAZOLE SODIUM 40 MG: 40 TABLET, DELAYED RELEASE ORAL at 11:04

## 2021-11-10 ASSESSMENT — ACTIVITIES OF DAILY LIVING (ADL)
ADLS_ACUITY_SCORE: 4
ADLS_ACUITY_SCORE: 12
ADLS_ACUITY_SCORE: 4

## 2021-11-10 ASSESSMENT — MIFFLIN-ST. JEOR: SCORE: 1508.13

## 2021-11-10 NOTE — PHARMACY-ADMISSION MEDICATION HISTORY
Admission Medication History Completed by Pharmacy    See [facility] Admission Navigator for allergy information, preferred outpatient pharmacy, prior to admission medications and immunization status.     Medication History Sources:     Extensive medication review with BMT pharmacy prior to transplant 10/19/21.  Refer to that note for more information.     Changes made to PTA medication list (reason):    Added: None    Deleted: None    Changed: None    Additional Information:    None    Prior to Admission medications    Medication Sig Last Dose Taking? Auth Provider   acyclovir (ZOVIRAX) 400 MG tablet Take 1 tablet (400 mg) by mouth 2 times daily 11/10/2021 at Unknown time Yes Barbi Vazquez MD   aspirin (ASA) 81 MG EC tablet Take 1 tablet (81 mg) by mouth daily Past Month at Unknown time Yes Nasim Means MD   calcium carb-cholecalciferol (OS-HARDEEP) 500-200 MG-UNIT tablet Take 1 tablet by mouth daily 11/9/2021 at Unknown time Yes Haven Wells PA-C   FISH OIL 1000 MG OR CAPS  11/10/2021 at Unknown time Yes Reported, Patient   gabapentin (NEURONTIN) 300 MG capsule Take 1 capsule (300 mg) by mouth 2 times daily 11/10/2021 at Unknown time Yes Haven Wells PA-C   LENalidomide (REVLIMID) 25 MG CAPS capsule  Unknown at Unknown time Yes Haven Wells PA-C   levothyroxine (SYNTHROID/LEVOTHROID) 50 MCG tablet Take 1 tablet (50 mcg) by mouth daily 11/10/2021 at Unknown time Yes Haven Wells PA-C   pantoprazole (PROTONIX) 20 MG EC tablet Take 1 tablet (20 mg) by mouth daily Unknown at Unknown time Yes Barbi Vazquez MD   potassium chloride ER (KLOR-CON M) 10 MEQ CR tablet Take 2 tablets (20 mEq) by mouth daily 11/10/2021 at Unknown time Yes Barbi Vazquez MD   prochlorperazine (COMPAZINE) 10 MG tablet Take 1 tablet (10 mg) by mouth every 6 hours as needed (Nausea/Vomiting) Unknown at Unknown time Yes Barbi Vazquez MD   rosuvastatin (CRESTOR) 40 MG tablet TAKE ONE TABLET BY MOUTH ONCE DAILY.  MUST SCHEDULE APPOINTMENT FOR ANNUAL FOLLOW UP FOR FURTHER REFILLS 11/9/2021 at Unknown time Yes Selma Johnson APRN CNP   hydrochlorothiazide (HYDRODIURIL) 50 MG tablet Take 1 tablet (50 mg) by mouth daily   Costa Saha MD   losartan (COZAAR) 50 MG tablet Take 1 tablet (50 mg) by mouth daily   Ip, Dominguez Garcia MD   order for DME Werner Galarza, PAMarilinC       Date completed: 11/10/21    Medication history completed by: Too Mallory [facility]

## 2021-11-10 NOTE — H&P
BMT History & Physical     Patient Demographics   Patient ID:  June Ronquillo   Age:  51 year old   Sex:  male  Reason for Admission/CC: multiple myeloma  Date:  11/10/2021  Service: BMT   Informant:  Patient and Chart  Resuscitation Status: Full Code    Patient ID:  June Ronquillo is a 51 year old male, currently day -1 of his autologous hematopoietic cell transplant.    Transplant Essential Data:   Diagnosis MM Multiple myeloma  BMTCT Type Autologous    Prep Regimen melphalan  Donor Source Autologous    GVHD Prophylaxis None  Primary BMT MD Dr. Means/Du Newsome    Clinical Trials   none       HPI: June Ronquillo is a 50yo M with high risk multiple myeloma. He initially had symptoms with spontaneous rib fracture about two years ago. Then again had a rib fracture which led to a workup and diagnosis of myeloma.     He had 6 cycles of RVD with daratumumab between May and October 2021. He does have peripheral neuropathy as a result of chemotherapy.    He collected well via peripheral collections with cell yield of 6.99x10^6.    During workup, he was found to have hypermetabolic thyroid nodules. Biopsy confirmed papillary thyroid carcinoma. Long term plan is for thyroid resection.    Today he is overall feeling well. No infectious concerns. Recently started gabapentin for neuropathy which is helping, and would also like to start a B complex vitamin. He did hit his head on a cabinet this morning while making breakfast. There is a small laceration on top of his head, but no active bleeding.      Diagnosis and Treatment Summary     Disease presentation and baseline characteristics: nontraumatic rib fracture    Date Treatment Name Response Side Effects / Toxicities    May to October 2021 RVD with daratumumab VGPR Peripheral neuropathy            BMT Work Up Results     Blood Counts Recent Labs   Lab Test 11/10/21  0842 11/05/21  0840 11/04/21  0906 11/03/21  1000 10/20/21  1145 10/14/21  0943   HGB 12.0*  11.8* 12.0*   < > 13.0* 13.5   HCT 36.5* 36.5* 37.3*   < > 38.7* 38.1*   WBC 5.7 70.8* 69.5*   < > 6.7 6.5   ANEUTAUTO 3.3  --   --   --  3.3 3.8   ALYMPAUTO 1.4  --   --   --  2.1 1.6   AMONOAUTO 0.8  --   --   --  0.8 0.7   AEOSAUTO 0.2  --   --   --  0.4 0.4   ABSBASO 0.0  --   --   --  0.0 0.1   NRBCMAN 0.0  --   --   --  0.0 0.0    129* 321   < > 456* 489*    < > = values in this interval not displayed.      Bone Marrow 10/14/21    Bone marrow, posterior iliac crest, left decalcified trephine biopsy and touch imprint; left particle crush, direct aspirate smear, and concentrated aspirate smear; and peripheral smear:  -No morphologic or immunophenotypic evidence of plasma cell neoplasm  -Normocellular marrow for age (variable ranging 40% to 60%, overall estimated at 50%) with orderly trilineage hematopoietic maturation, megakaryocytic hyperplasia, and 1% plasma cells  - Peripheral blood showing slight normochromic, normocytic anemia; slight thrombocytosis  - See comment    Flow: A. Iliac Crest, Left:  -Polytypic plasma cells  See comment          Blood Type Recent Labs   Lab Test 10/13/21  1354   ABORH A POS        Chemistries Recent Labs   Lab Test 11/10/21  0842 11/05/21  0840 11/04/21  0906    138 139   POTASSIUM 3.8 3.8 3.9   CHLORIDE 104 103 108   CO2 27 27 24   BUN 12 10 9   CR 0.89 0.93 0.84   * 144* 154*       Liver Tests Recent Labs   Lab Test 11/10/21  0842 10/20/21  1145 10/14/21  0943   BILITOTAL 0.3 0.5 0.6   ALKPHOS 107 64 66   AST 10 18 15   ALT 19 17 15   ALBUMIN 3.6 4.2 4.1      PET/CT: 10/15/21    IMPRESSION:  Scattered non-FDG avid lytic predominant skeletal lesions consistent with multiple myeloma, similar to 08/31/2021. Findings suggest quiescent disease.     Chest X-Ray: Results for orders placed in visit on 11/03/21    XR CHEST 2 VW    Status: Normal 11/3/2021    Narrative  Exam: XR CHEST 2 VW, 11/3/2021 2:40 PM    Indication: check central line placement; Encounter for  central line  placement    Comparison: Radiograph 10/18/2021    Findings:  PA and lateral views of the chest. Midline trachea. No pneumothorax or  pleural effusions. No airspace opacities. Normal cardiac silhouette  and mediastinum. No acute osseous abnormalities. Unremarkable upper  abdomen. Right IJ dialysis catheter with the tip in the mid SVC.    Impression  Impression: Right dialysis catheter with the tip in the mid SVC.  Otherwise normal chest radiograph.    I have personally reviewed the examination and initial interpretation  and I agree with the findings.    CASSANDRA RUSSO MD      SYSTEM ID:  [id number]       Chest CT No results found for this or any previous visit.     PFTs FVC%  Recent Labs   Lab Test 10/13/21  1403   20003 64       FEV1%  Recent Labs   Lab Test 10/13/21  1403   16515 67       DLCO%  Recent Labs   Lab Test 10/13/21  1403   20143 70        ECHO or MUGA:   10/18/21  Interpretation Summary  Global and regional left ventricular function is normal with an EF of 60-65%.  Global peak LV longitudinal strain is averaged at -18.6%. This is within  reported normal limits (normal <-18%).  Right ventricular function, chamber size, wall motion, and thickness are  normal.  No significant valvular abnormalities were noted.  Previous study not available for comparison.     EKG ECG results from 10/13/21   EKG 12-lead complete w/read - Clinics     Value    Systolic Blood Pressure     Diastolic Blood Pressure     Ventricular Rate 85    Atrial Rate 85    ND Interval 140    QRS Duration 86        QTc 454    P Axis 64    R AXIS 16    T Axis 29    Interpretation ECG      Sinus rhythm  Nonspecific ST and T wave abnormality  Abnormal ECG  When compared with ECG of 17-MAY-2021 09:43,  Nonspecific T wave abnormality, improved in Inferior leads  T wave inversion no longer evident in Anterolateral leads  Confirmed by Nolan Kern ([id number]) on 10/14/2021 10:21:05 AM        Serologies: CMV: No lab results  found.  EBV:   Recent Labs   Lab Test 10/13/21  1354   EBVCAG Positive*     HSV:   Recent Labs   Lab Test 10/13/21  1354   [id number] 10.60*   H1IGG Positive.  IgG antibody to HSV-1 detected.*   H6IILIP 0.05   H2IGG No HSV-2 IgG antibodies detected.     VZV: No lab results found.  HTLV:   Recent Labs   Lab Test 10/13/21  1354   DHTLVA Non-Reactive      Vitamin D Recent Labs   Lab Test 01/25/21  1633   VITDT 29      [facility] [place] IDMs Recent Labs   Lab Test 10/13/21  1354   DCMIG Positive*   DHBSAG Non-Reactive   DHBCAB Non-Reactive   DHIVAB Non-Reactive   DHCVAB Non-Reactive   DHTLVA Non-Reactive   TCRUZI Non-Reactive   DTRPAB Non-Reactive        Other Lab Results:     COVID:  Recent Labs   Lab Test 11/08/21  1244   [id number] Negative       LDH  Recent Labs   Lab Test 10/13/21  1354 06/04/21  1434    70*       Creatinine Clearance  Recent Labs   Lab Test 10/16/21  0525      WT 73.0   RAW 95   STD 90   VOL 1,740  1,850   DUR 24.0  24.0   CREATININE 1.01   UCRR 77  75   UCR24 1.34  1.39       Immunoglobulins:   Recent Labs   Lab Test 10/13/21  1354 07/09/21  1440 06/18/21  1444 05/27/21  0855 04/30/21  1702   * 531* 452* 581* 702     Recent Labs   Lab Test 10/13/21  1354 07/09/21  1440 06/18/21  1444 05/27/21  0855 04/30/21  1702   IGA 52* 26* 41* 37* 42*     Recent Labs   Lab Test 10/13/21  1354 07/09/21  1440 06/18/21  1444 05/27/21  0855 04/30/21  1702   IGM 14* 30* 14* 14* 12*       Monoclonal Protein Studies   M spike:   Recent Labs   Lab Test 10/13/21  1354 09/17/21  1441 09/08/21  1140 08/23/21  1443 07/23/21  1502 07/09/21  1440   ELPM 0.1* 0.1* 0.1* 0.1* 0.1* 0.1*     Kappa FLC:   Recent Labs   Lab Test 10/13/21  1354 09/08/21  1140 08/23/21  1443 07/30/21  1513   KFLCA 0.66 0.94 0.81 0.57     Lambda FLC:   Recent Labs   Lab Test 10/13/21  1354 09/08/21  1140 08/23/21  1443 07/30/21  1513   LFLCA 0.63 0.92 0.48* 0.44*     FLC Ratio:   Recent Labs   Lab Test 10/13/21  1354  09/08/21  1140 08/23/21  1443 07/30/21  1513   KLRA 1.05 1.02 1.69* 1.30       B2-Microglobulin  Recent Labs   Lab Test 10/13/21  1354 04/30/21  1702   [id number] 1.9 2.7*       Urine Studies   Recent Labs   Lab Test 10/13/21  1333   COLOR Straw   APPEARANCE Clear   URINEGLC Negative   URINEBILI Negative   URINEKETONE Negative   SG 1.012   UBLD Negative   URINEPH 6.0   PROTEIN Negative   UUROI Normal   NITRITE Negative   LEUKEST Negative   RBCU <1   WBCU 1       I have assessed all abnormal lab values for their clinical significance and any values considered clinically significant have been addressed in the assessment and plan    Family History:   Family History   Problem Relation Age of Onset     Hypertension Mother      Hyperlipidemia Mother      Heart Disease Paternal Grandmother      Hyperlipidemia Sister      Hyperlipidemia Sister        Social History:   Social History     Socioeconomic History     Marital status:      Spouse name: Not on file     Number of children: Not on file     Years of education: Not on file     Highest education level: Not on file   Occupational History     Not on file   Tobacco Use     Smoking status: Never Smoker     Smokeless tobacco: Never Used   Substance and Sexual Activity     Alcohol use: Not Currently     Drug use: No     Sexual activity: Yes     Partners: Female   Other Topics Concern     Parent/sibling w/ CABG, MI or angioplasty before 65F 55M? No      Service Not Asked     Blood Transfusions Not Asked     Caffeine Concern No     Occupational Exposure Not Asked     Hobby Hazards Not Asked     Sleep Concern Not Asked     Stress Concern Not Asked     Weight Concern Not Asked     Special Diet No     Back Care Not Asked     Exercise No     Bike Helmet Not Asked     Seat Belt Yes     Self-Exams Not Asked   Social History Narrative     Not on file     Social Determinants of Health     Financial Resource Strain: Not on file   Food Insecurity: Not on file    Transportation Needs: Not on file   Physical Activity: Not on file   Stress: Not on file   Social Connections: Not on file   Intimate Partner Violence: Not At Risk     Fear of Current or Ex-Partner: No     Emotionally Abused: No     Physically Abused: No     Sexually Abused: No   Housing Stability: Not on file       Past Medical History:   Past Medical History:   Diagnosis Date     CAD (coronary artery disease)     s/p stent to CFX     Heart attack (H)      Hyperlipidemia LDL goal <100      Hypertension      Stented coronary artery      Thyroid disease         Past Surgical History:   Past Surgical History:   Procedure Laterality Date     BONE MARROW BIOPSY, BONE SPECIMEN, NEEDLE/TROCAR Left 5/17/2021    Procedure: BIOPSY, BONE MARROW with aspiration and ancillary studies;  Surgeon: Niharika Regalado MD;  Location: [facility] OR     BONE MARROW BIOPSY, BONE SPECIMEN, NEEDLE/TROCAR Left 10/14/2021    Procedure: BIOPSY, BONE MARROW;  Surgeon: Alexa Albert APRN CNP;  Location: [facility] OR     HEART CATH PTCA SINGLE VESSEL  10/10/2004    Stent to CFX - [facility]      INSERT CATHETER VASCULAR ACCESS Right 11/3/2021    Procedure: NON VALVED TUNNELED DUAL LUMEN APHARESIS CAPABLE LINE INSERTION, VASCULAR ACCESS CATHETER;  Surgeon: Werner Mcnamara MD;  Location: [facility] OR     IR CVC TUNNEL PLACEMENT > 5 YRS OF AGE  11/3/2021       Allergies:   Allergies   Allergen Reactions     Blood Transfusion Related (Informational Only) Other (See Comments)     Patient has a history of a clinically significant antibody against RBC antigens.  A delay in compatible RBCs may occur.        Home Medications      Prior to Admission medications    Medication Sig Start Date End Date Taking? Authorizing Provider   acyclovir (ZOVIRAX) 400 MG tablet Take 1 tablet (400 mg) by mouth 2 times daily 5/19/21  Yes Barbi Vazquez MD   aspirin (ASA) 81 MG EC tablet Take 1 tablet (81 mg) by mouth daily 10/25/21  Yes Nasim Means MD    calcium carb-cholecalciferol (OS-HARDEEP) 500-200 MG-UNIT tablet Take 1 tablet by mouth daily 11/3/21  Yes Haven Wells PA-C   FISH OIL 1000 MG OR CAPS    Yes Reported, Patient   gabapentin (NEURONTIN) 300 MG capsule Take 1 capsule (300 mg) by mouth 2 times daily 11/3/21  Yes Haven Wells PA-C   LENalidomide (REVLIMID) 25 MG CAPS capsule  11/3/21  Yes Haven Wells PA-C   levothyroxine (SYNTHROID/LEVOTHROID) 50 MCG tablet Take 1 tablet (50 mcg) by mouth daily 11/3/21  Yes Haven Wells PA-C   pantoprazole (PROTONIX) 20 MG EC tablet Take 1 tablet (20 mg) by mouth daily 6/30/21  Yes Barbi Vazquez MD   potassium chloride ER (KLOR-CON M) 10 MEQ CR tablet Take 2 tablets (20 mEq) by mouth daily 10/12/21  Yes Barbi Vazquez MD   prochlorperazine (COMPAZINE) 10 MG tablet Take 1 tablet (10 mg) by mouth every 6 hours as needed (Nausea/Vomiting) 5/17/21  Yes Barbi Vazquez MD   rosuvastatin (CRESTOR) 40 MG tablet TAKE ONE TABLET BY MOUTH ONCE DAILY. MUST SCHEDULE APPOINTMENT FOR ANNUAL FOLLOW UP FOR FURTHER REFILLS 10/4/21  Yes Selma Johnson APRN CNP   hydrochlorothiazide (HYDRODIURIL) 50 MG tablet Take 1 tablet (50 mg) by mouth daily 7/15/21   Costa Saha MD   losartan (COZAAR) 50 MG tablet Take 1 tablet (50 mg) by mouth daily 7/2/21   Dominguez Craig MD   order for DME Rib belt 7/25/20   Werner Rivas PA-C       Review of Systems    Review of Systems:  CONSTITUTIONAL: NEGATIVE for fever, chills, change in weight  INTEGUMENTARY/SKIN: NEGATIVE for worrisome rashes, moles or lesions  EYES: NEGATIVE for vision changes or irritation  ENT/MOUTH: NEGATIVE for ear, mouth and throat problems  RESP: NEGATIVE for significant cough or SOB  BREAST: NEGATIVE for masses, tenderness or discharge  CV: NEGATIVE for chest pain, palpitations or peripheral edema  GI: NEGATIVE for nausea, abdominal pain, heartburn, or change in bowel habits  : NEGATIVE for frequency, dysuria, or  "hematuria  MUSCULOSKELETAL: NEGATIVE for significant arthralgias or myalgia  NEURO: NEGATIVE for weakness, dizziness or paresthesias  ENDOCRINE: NEGATIVE for temperature intolerance, skin/hair changes  HEME/ALLERGY: NEGATIVE for bleeding problems  PSYCHIATRIC: NEGATIVE for changes in mood or affect    PHYSICAL EXAM      Weight     Wt Readings from Last 3 Encounters:   11/10/21 74 kg (163 lb 2.3 oz)   11/05/21 76.4 kg (168 lb 6.4 oz)   11/04/21 74.8 kg (164 lb 14.5 oz)        KPS: 90    BP (!) 136/99 (BP Location: Left arm)   Pulse 83   Temp 97.6  F (36.4  C) (Axillary)   Resp 16   Ht 1.645 m (5' 4.76\")   Wt 74 kg (163 lb 2.3 oz)   SpO2 98%   BMI 27.35 kg/m       General: NAD   Eyes: sclera anicteric   Nose/Mouth/Throat: OP clear, buccal mucosa moist, no ulcerations   Lungs: CTA bilaterally  Cardiovascular: RRR, no M/R/G   Abdominal/Rectal: +BS, soft, NT, ND, No HSM   Lymphatics: No edema  Skin: No rash. Small laceration on the top of his head, scab in place, no active bleeding or surrounding swelling  Neuro: A&O   Additional Findings: Weldon site NT, no drainage.      LABS AND IMAGING: I have assessed all abnormal lab values for their clinical significance and any values considered clinically significant have been addressed in the assessment and plan.        Lab Results   Component Value Date    WBC 5.7 11/10/2021    ANEUTAUTO 3.3 11/10/2021    HGB 12.0 (L) 11/10/2021    HCT 36.5 (L) 11/10/2021     11/10/2021     11/10/2021    POTASSIUM 3.8 11/10/2021    CHLORIDE 104 11/10/2021    CO2 27 11/10/2021     (H) 11/10/2021    BUN 12 11/10/2021    CR 0.89 11/10/2021    MAG 2.1 11/10/2021    INR 0.95 11/10/2021           SYSTEMS-BASED ASSESSMENT AND PLAN     June Ronquillo is a 51 year old male with high risk multiple myeloma, D-1 auto BMT      Prep: melphalan on D-1  Transplant 11/11      1. BMT  - BMT doc/Coordinator: Miguelangel/Du Newsome; Lorelei/Christine  - Cell dose 6.99 x 10^6; give all cells " due to high risk disease  - Prep as above. Flush and pre-meds prior to transplant.  - GCSF starts day +5, continue until ANC > 2500 for 2 consecutive days.     2. HEME/COAG  - Transfusion parameters: hgb <7g/dL and plts <10,000  - anticipate cytopenias due to chemotherapy    3. ID  - No active infections.    Prophy:  Viral: ACV BID (CMV+)  Bacterial: levofloxacin 250mg daily  Fungal: fluc  PCP: Bactrim or other PCP prophy to start at day +28      4. GI/NUTRITION  - Anti-emetics: zofran/dex prior to transplant to prevent chemotherapy induced n/v.  Ativan and compazine prn.   - Ulcer prophy: Protonix 40mg daily.     5. CV  - Hx of CAD, MI at age 28 with stent placed  - HTN: cont losartan. Hold hydrochlorothiazide through transplant  - Hyperlipidemia: hold statin through transplant    5. RENAL/  - Monitor Cr and electrolytes daily.   - Replace electrolytes per sliding scale    6. ENDOCRINE  - hx hypothyroidism  - new diagnosis of papillary thyroid carcinoma per bx result from 11/2, appears that the plan is for thyroidectomy after BMT    7. SUPPORTIVE CARE  - PT/OT to evaluate on admission and follow as indicated.       8. Neuro  - peripheral neuropathy: started gabapentin 300mg BID  - start B complex vitamin (contains vit C) about 2 weeks post transplant to avoid interaction with chemo    Patient was given a copy of consents in printed format on admission.       Bibi Delgado

## 2021-11-10 NOTE — TELEPHONE ENCOUNTER
FUTURE VISIT INFORMATION      FUTURE VISIT INFORMATION:    Date: 11/29/21    Time: 3:20 PM    [place] [facility] [place]  REFERRAL INFORMATION:    Referring provider: Dr. Makeda Elder    Referring providers clinic: [facility] - Endocrinology    Reason for visit/diagnosis: Thyroid Nodule    RECORDS REQUESTED FROM:       Clinic name Comments Records Status Imaging Status   [facility] 11/4/21 - MyChart Referral from Dr. Elder  11/3/21 - BMT OV with NIKUNJ Harrison  10/25/21 - ENDO OV with Dr. Elder  10/20/21 - HEM OV with Dr. Wang [facility] - Biopsy 11/2/21 - Thyroid FNA [facility] - Lab 10/20/21 - T3,T4, TSH [facility] - Imaging 11/8/21 - US Head Neck  11/3/21 - US Head Neck  11/2/21 - US Thyroid FNA  10/28/21 - US Thyroid  10/15/21 - PET Oncology  5/11/21 - CT Neck [facility] PACs

## 2021-11-10 NOTE — PROGRESS NOTES
Chemo drug: Melphalan  Tolerated: no issues  Intervention:  Bolus before and after, cryotherapy, scheduled antiemetics  Response: no issues  Plan: Continue to monitor.  Transplant tomorrow around 1400 4 bags     Yes

## 2021-11-10 NOTE — PLAN OF CARE
AVSS.  No pain, no nausea.   Chemo given with flushes and ice.  Eating and drinking.  Dressing and caps changed.  Up walking the halls.  Transplant tomorrow around 2pm.  Therapy tomorrow sometime.  Discharge teaching with EM today.  Needs c.diff and VRE.  Continue to monitor, continue plan of care.      Problem: Adult Inpatient Plan of Care  Goal: Plan of Care Review  Outcome: No Change  Flowsheets (Taken 11/10/2021 1617)  Plan of Care Reviewed With: patient

## 2021-11-11 ENCOUNTER — APPOINTMENT (OUTPATIENT)
Dept: OCCUPATIONAL THERAPY | Facility: CLINIC | Age: 51
DRG: 016 | End: 2021-11-11
Attending: PHYSICIAN ASSISTANT
Payer: COMMERCIAL

## 2021-11-11 ENCOUNTER — DOCUMENTATION ONLY (OUTPATIENT)
Dept: TRANSPLANT | Facility: CLINIC | Age: 51
End: 2021-11-11
Payer: COMMERCIAL

## 2021-11-11 LAB
ANION GAP SERPL CALCULATED.3IONS-SCNC: 10 MMOL/L (ref 3–14)
BASOPHILS # BLD AUTO: 0 10E3/UL (ref 0–0.2)
BASOPHILS NFR BLD AUTO: 0 %
BUN SERPL-MCNC: 16 MG/DL (ref 7–30)
C DIFF TOX B STL QL: NEGATIVE
CALCIUM SERPL-MCNC: 8.7 MG/DL (ref 8.5–10.1)
CHLORIDE BLD-SCNC: 104 MMOL/L (ref 94–109)
CO2 SERPL-SCNC: 23 MMOL/L (ref 20–32)
CREAT SERPL-MCNC: 0.77 MG/DL (ref 0.66–1.25)
EOSINOPHIL # BLD AUTO: 0 10E3/UL (ref 0–0.7)
EOSINOPHIL NFR BLD AUTO: 0 %
ERYTHROCYTE [DISTWIDTH] IN BLOOD BY AUTOMATED COUNT: 14.6 % (ref 10–15)
GFR SERPL CREATININE-BSD FRML MDRD: >90 ML/MIN/1.73M2
GLUCOSE BLD-MCNC: 143 MG/DL (ref 70–99)
HCT VFR BLD AUTO: 34.1 % (ref 40–53)
HGB BLD-MCNC: 11.4 G/DL (ref 13.3–17.7)
IMM GRANULOCYTES # BLD: 0.1 10E3/UL
IMM GRANULOCYTES NFR BLD: 1 %
LYMPHOCYTES # BLD AUTO: 0.5 10E3/UL (ref 0.8–5.3)
LYMPHOCYTES NFR BLD AUTO: 6 %
MAGNESIUM SERPL-MCNC: 1.7 MG/DL (ref 1.6–2.3)
MCH RBC QN AUTO: 30.7 PG (ref 26.5–33)
MCHC RBC AUTO-ENTMCNC: 33.4 G/DL (ref 31.5–36.5)
MCV RBC AUTO: 92 FL (ref 78–100)
MONOCYTES # BLD AUTO: 0.3 10E3/UL (ref 0–1.3)
MONOCYTES NFR BLD AUTO: 3 %
NEUTROPHILS # BLD AUTO: 8.1 10E3/UL (ref 1.6–8.3)
NEUTROPHILS NFR BLD AUTO: 90 %
NRBC # BLD AUTO: 0 10E3/UL
NRBC BLD AUTO-RTO: 0 /100
PLATELET # BLD AUTO: 239 10E3/UL (ref 150–450)
POTASSIUM BLD-SCNC: 3.8 MMOL/L (ref 3.4–5.3)
RBC # BLD AUTO: 3.71 10E6/UL (ref 4.4–5.9)
SODIUM SERPL-SCNC: 137 MMOL/L (ref 133–144)
WBC # BLD AUTO: 9 10E3/UL (ref 4–11)

## 2021-11-11 PROCEDURE — 999N000147 HC STATISTIC PT IP EVAL DEFER: Performed by: PHYSICAL THERAPIST

## 2021-11-11 PROCEDURE — 250N000011 HC RX IP 250 OP 636: Performed by: PHYSICIAN ASSISTANT

## 2021-11-11 PROCEDURE — 85025 COMPLETE CBC W/AUTO DIFF WBC: CPT | Performed by: PHYSICIAN ASSISTANT

## 2021-11-11 PROCEDURE — 97530 THERAPEUTIC ACTIVITIES: CPT | Mod: GO

## 2021-11-11 PROCEDURE — 87081 CULTURE SCREEN ONLY: CPT | Performed by: PHYSICIAN ASSISTANT

## 2021-11-11 PROCEDURE — 250N000012 HC RX MED GY IP 250 OP 636 PS 637: Performed by: INTERNAL MEDICINE

## 2021-11-11 PROCEDURE — 97165 OT EVAL LOW COMPLEX 30 MIN: CPT | Mod: GO

## 2021-11-11 PROCEDURE — 38241 TRANSPLT AUTOL HCT/DONOR: CPT | Performed by: INTERNAL MEDICINE

## 2021-11-11 PROCEDURE — 30243Y0 TRANSFUSION OF AUTOLOGOUS HEMATOPOIETIC STEM CELLS INTO CENTRAL VEIN, PERCUTANEOUS APPROACH: ICD-10-PCS | Performed by: INTERNAL MEDICINE

## 2021-11-11 PROCEDURE — 87493 C DIFF AMPLIFIED PROBE: CPT | Performed by: PHYSICIAN ASSISTANT

## 2021-11-11 PROCEDURE — 250N000013 HC RX MED GY IP 250 OP 250 PS 637: Performed by: PHYSICIAN ASSISTANT

## 2021-11-11 PROCEDURE — 99231 SBSQ HOSP IP/OBS SF/LOW 25: CPT | Mod: 25 | Performed by: INTERNAL MEDICINE

## 2021-11-11 PROCEDURE — 250N000011 HC RX IP 250 OP 636: Performed by: INTERNAL MEDICINE

## 2021-11-11 PROCEDURE — 206N000001 HC R&B BMT UMMC

## 2021-11-11 PROCEDURE — 250N000013 HC RX MED GY IP 250 OP 250 PS 637: Performed by: INTERNAL MEDICINE

## 2021-11-11 PROCEDURE — 83735 ASSAY OF MAGNESIUM: CPT | Performed by: INTERNAL MEDICINE

## 2021-11-11 PROCEDURE — 80048 BASIC METABOLIC PNL TOTAL CA: CPT | Performed by: PHYSICIAN ASSISTANT

## 2021-11-11 PROCEDURE — 999N000022 HC STATISTIC AUTOLOGOUS BM-INITIAL INFUSION

## 2021-11-11 PROCEDURE — 258N000003 HC RX IP 258 OP 636: Performed by: PHYSICIAN ASSISTANT

## 2021-11-11 RX ORDER — GABAPENTIN 300 MG/1
300 CAPSULE ORAL DAILY
Status: DISCONTINUED | OUTPATIENT
Start: 2021-11-12 | End: 2021-11-12 | Stop reason: HOSPADM

## 2021-11-11 RX ORDER — POTASSIUM CHLORIDE 29.8 MG/ML
20 INJECTION INTRAVENOUS ONCE
Status: COMPLETED | OUTPATIENT
Start: 2021-11-11 | End: 2021-11-11

## 2021-11-11 RX ORDER — MAGNESIUM SULFATE HEPTAHYDRATE 40 MG/ML
2 INJECTION, SOLUTION INTRAVENOUS ONCE
Status: COMPLETED | OUTPATIENT
Start: 2021-11-11 | End: 2021-11-11

## 2021-11-11 RX ORDER — SODIUM CHLORIDE 9 MG/ML
INJECTION, SOLUTION INTRAVENOUS CONTINUOUS
Status: ACTIVE | OUTPATIENT
Start: 2021-11-11 | End: 2021-11-11

## 2021-11-11 RX ORDER — ONDANSETRON 8 MG/1
8 TABLET, FILM COATED ORAL EVERY 8 HOURS PRN
Status: DISCONTINUED | OUTPATIENT
Start: 2021-11-11 | End: 2021-11-12 | Stop reason: HOSPADM

## 2021-11-11 RX ORDER — ONDANSETRON 8 MG/1
8 TABLET, FILM COATED ORAL EVERY 8 HOURS PRN
Qty: 60 TABLET | Refills: 1 | Status: SHIPPED | OUTPATIENT
Start: 2021-11-11 | End: 2022-01-25

## 2021-11-11 RX ORDER — LEVOFLOXACIN 250 MG/1
250 TABLET, FILM COATED ORAL DAILY
Qty: 30 TABLET | Refills: 1 | Status: ON HOLD | OUTPATIENT
Start: 2021-11-11 | End: 2021-11-22

## 2021-11-11 RX ORDER — GABAPENTIN 300 MG/1
600 CAPSULE ORAL AT BEDTIME
Status: DISCONTINUED | OUTPATIENT
Start: 2021-11-11 | End: 2021-11-12 | Stop reason: HOSPADM

## 2021-11-11 RX ORDER — HEPARIN SODIUM,PORCINE 10 UNIT/ML
5 VIAL (ML) INTRAVENOUS
Status: CANCELLED | OUTPATIENT
Start: 2021-11-11

## 2021-11-11 RX ORDER — GABAPENTIN 300 MG/1
CAPSULE ORAL
Qty: 60 CAPSULE | Refills: 1 | COMMUNITY
Start: 2021-11-11 | End: 2021-11-12

## 2021-11-11 RX ORDER — HEPARIN SODIUM (PORCINE) LOCK FLUSH IV SOLN 100 UNIT/ML 100 UNIT/ML
5 SOLUTION INTRAVENOUS
Status: CANCELLED | OUTPATIENT
Start: 2021-11-11

## 2021-11-11 RX ORDER — FLUCONAZOLE 200 MG/1
200 TABLET ORAL DAILY
Qty: 30 TABLET | Refills: 1 | Status: SHIPPED | OUTPATIENT
Start: 2021-11-12 | End: 2022-01-10

## 2021-11-11 RX ORDER — ACYCLOVIR 800 MG/1
800 TABLET ORAL 2 TIMES DAILY
Qty: 60 TABLET | Refills: 3 | Status: SHIPPED | OUTPATIENT
Start: 2021-11-11 | End: 2022-03-11

## 2021-11-11 RX ADMIN — LEVOTHYROXINE SODIUM 50 MCG: 0.05 TABLET ORAL at 08:36

## 2021-11-11 RX ADMIN — ACETAMINOPHEN 650 MG: 325 TABLET, FILM COATED ORAL at 13:38

## 2021-11-11 RX ADMIN — MAGNESIUM SULFATE HEPTAHYDRATE 2 G: 40 INJECTION, SOLUTION INTRAVENOUS at 05:58

## 2021-11-11 RX ADMIN — ACETAMINOPHEN 650 MG: 325 TABLET, FILM COATED ORAL at 00:43

## 2021-11-11 RX ADMIN — PANTOPRAZOLE SODIUM 40 MG: 40 TABLET, DELAYED RELEASE ORAL at 08:36

## 2021-11-11 RX ADMIN — LEVOFLOXACIN 250 MG: 250 TABLET, FILM COATED ORAL at 10:53

## 2021-11-11 RX ADMIN — LORAZEPAM 0.5 MG: 2 INJECTION INTRAMUSCULAR; INTRAVENOUS at 00:34

## 2021-11-11 RX ADMIN — GABAPENTIN 600 MG: 300 CAPSULE ORAL at 21:22

## 2021-11-11 RX ADMIN — POTASSIUM CHLORIDE 20 MEQ: 29.8 INJECTION, SOLUTION INTRAVENOUS at 05:54

## 2021-11-11 RX ADMIN — Medication 25 MG: at 13:38

## 2021-11-11 RX ADMIN — DEXAMETHASONE 8 MG: 4 TABLET ORAL at 08:37

## 2021-11-11 RX ADMIN — SODIUM CHLORIDE: 9 INJECTION, SOLUTION INTRAVENOUS at 10:53

## 2021-11-11 RX ADMIN — ACYCLOVIR 800 MG: 800 TABLET ORAL at 08:37

## 2021-11-11 RX ADMIN — POTASSIUM CHLORIDE 20 MEQ: 750 TABLET, EXTENDED RELEASE ORAL at 08:36

## 2021-11-11 RX ADMIN — PROCHLORPERAZINE EDISYLATE 10 MG: 5 INJECTION INTRAMUSCULAR; INTRAVENOUS at 13:38

## 2021-11-11 RX ADMIN — FLUCONAZOLE 200 MG: 200 TABLET ORAL at 08:37

## 2021-11-11 RX ADMIN — ONDANSETRON HYDROCHLORIDE 8 MG: 8 TABLET, FILM COATED ORAL at 04:04

## 2021-11-11 RX ADMIN — GABAPENTIN 300 MG: 300 CAPSULE ORAL at 08:36

## 2021-11-11 RX ADMIN — LOSARTAN POTASSIUM 50 MG: 50 TABLET, FILM COATED ORAL at 08:36

## 2021-11-11 RX ADMIN — ACYCLOVIR 800 MG: 800 TABLET ORAL at 19:53

## 2021-11-11 ASSESSMENT — ACTIVITIES OF DAILY LIVING (ADL)
ADLS_ACUITY_SCORE: 3
ADLS_ACUITY_SCORE: 3
ADLS_ACUITY_SCORE: 4
ADLS_ACUITY_SCORE: 3
ADLS_ACUITY_SCORE: 4
ADLS_ACUITY_SCORE: 4
ADLS_ACUITY_SCORE: 3
ADLS_ACUITY_SCORE: 4
ADLS_ACUITY_SCORE: 3
ADLS_ACUITY_SCORE: 3
ADLS_ACUITY_SCORE: 4
ADLS_ACUITY_SCORE: 3
ADLS_ACUITY_SCORE: 4
ADLS_ACUITY_SCORE: 4
ADLS_ACUITY_SCORE: 3
ADLS_ACUITY_SCORE: 4
ADLS_ACUITY_SCORE: 3

## 2021-11-11 ASSESSMENT — MIFFLIN-ST. JEOR: SCORE: 1502.05

## 2021-11-11 NOTE — PROGRESS NOTES
"BMT Daily Progress Note    ID: June Ronquillo is a 51 year old male, currently day 0 of his autologous hematopoietic cell transplant.    HPI: Doing ok. Melphalan last night. Nauseated last night, better this morning. Peripheral neuropathy quite bothersome at night, would like to increase dose of gabapentin in the evening    ROS: Negative except as stated above in HPI    Physical Exam  /70 (BP Location: Left arm)   Pulse 107   Temp 98.2  F (36.8  C) (Oral)   Resp 14   Ht 1.645 m (5' 4.76\")   Wt 73.4 kg (161 lb 12.8 oz)   SpO2 99%   BMI 27.12 kg/m    General: NAD   Eyes: sclera anicteric   Nose/Mouth/Throat: OP clear, buccal mucosa moist, no ulcerations   Lungs: CTA bilaterally  Cardiovascular: RRR, no M/R/G   Abdominal/Rectal: +BS, soft, NT, ND, No HSM   Lymphatics: No edema  Skin: No rash. Small laceration on the top of his head, scab in place, no active bleeding or surrounding swelling  Neuro: A&O   Additional Findings: Weldon site NT, no drainage.    Labs  Lab Results   Component Value Date    WBC 9.0 11/11/2021    ANEU 62.3 (H) 11/05/2021    HGB 11.4 (L) 11/11/2021    HCT 34.1 (L) 11/11/2021     11/11/2021     11/11/2021    POTASSIUM 3.8 11/11/2021    CHLORIDE 104 11/11/2021    CO2 23 11/11/2021     (H) 11/11/2021    BUN 16 11/11/2021    CR 0.77 11/11/2021    MAG 1.7 11/11/2021    INR 0.95 11/10/2021    BILITOTAL 0.3 11/10/2021    AST 10 11/10/2021    ALT 19 11/10/2021    ALKPHOS 107 11/10/2021    PROTTOTAL 6.7 (L) 11/10/2021    ALBUMIN 3.6 11/10/2021     A/P:  June Ronquillo is a 51 year old male with high risk multiple myeloma, D0 auto BMT        Prep: melphalan on D-1  Transplant 11/11 around 2pm        1. BMT  - BMT doc/Coordinator: Miguelangel/Du Newsome; Lorelei/Christine  - Cell dose 6.99 x 10^6; give all cells due to high risk disease  - Prep as above. Flush and pre-meds prior to transplant.  - GCSF starts day +5, continue until ANC > 2500 for 2 consecutive days. "      2. HEME/COAG  - Transfusion parameters: hgb <7g/dL and plts <10,000  - anticipate cytopenias due to chemotherapy     3. ID  - No active infections.     Prophy:  Viral: ACV BID (CMV+)  Bacterial: levofloxacin 250mg daily  Fungal: fluc  PCP: Bactrim or other PCP prophy to start at day +28        4. GI/NUTRITION  - Anti-emetics: zofran/dex prior to transplant to prevent chemotherapy induced n/v.  Ativan and compazine prn.   - Ulcer prophy: Protonix 40mg daily.      5. CV  - Hx of CAD, MI at age 28 with stent placed  - HTN: cont losartan. Hold hydrochlorothiazide through transplant  - Hyperlipidemia: hold statin through transplant     5. RENAL/  - Monitor Cr and electrolytes daily.   - Replace electrolytes per sliding scale     6. ENDOCRINE  - hx hypothyroidism  - new diagnosis of papillary thyroid carcinoma per bx result from 11/2, appears that the plan is for thyroidectomy after BMT     7. SUPPORTIVE CARE  - PT/OT to evaluate on admission and follow as indicated.         8. Neuro  - peripheral neuropathy: started gabapentin 300mg BID, increase pm dose to 600mg per patient request  - start B complex vitamin (contains vit C) about 2 weeks post transplant to avoid interaction with chemo    Dispo: Anticipate discharge tomorrow    Bibi Delgado PA-C  [phone]

## 2021-11-11 NOTE — PROGRESS NOTES
Type of transplant: Donor: Autologous  Product:   BMT INFUSION DOCUMENTATION (last 48 hours)     BMT/Cellular Product Infusion     Row Name 11/11/21 0800                [REMOVED] Product 11/11/21 0912 HPC, Apheresis    Product Details Product Release Date: 11/11/21 -JL Product Release Time: 0912 -JL Product Type: HPC, Apheresis  -JL DIN: [id number]  -JL Product Description Code: [id number]  -JL Volume Dispensed (mL): 80 mL  -JL Completion Date (RN to complete): 11/11/21  -AD Completion Time (RN to complete): 1437  -AD       Checked by (Patient RN) Kendal Villanueva RN  -AD       Checked by (Witness) Angelina Foster RN  -LR       Product Volume Infused (mL) 80 mL  -AD       Flush Volume (mL) 50 mL  -AD       Volume Dispensed (mL) --                [REMOVED] Product 11/11/21 0912 HPC, Apheresis    Product Details Product Release Date: 11/11/21 -JL Product Release Time: 0912 -JL Product Type: HPC, Apheresis  -JL DIN: [id number]  -JL Product Description Code: [id number]  -JL Volume Dispensed (mL): 80 mL  -JL Completion Date (RN to complete): 11/11/21  -AD Completion Time (RN to complete): 1456  -AD       Checked by (Patient RN) Kendal Villanueva RN  -AD       Checked by (Witness) Angelina Foster RN  -LR       Product Volume Infused (mL) 80 mL  -AD       Flush Volume (mL) 50 mL  -AD       Volume Dispensed (mL) --                [REMOVED] Product 11/11/21 0912 HPC, Apheresis    Product Details Product Release Date: 11/11/21 -JL Product Release Time: 0912 -JL Product Type: HPC, Apheresis  -JL DIN: [id number]  -JL Product Description Code: [id number]  -JL Volume Dispensed (mL): 80 mL  -JL Completion Date (RN to complete): 11/11/21  -AD Completion Time (RN to complete): 1513  -AD       Checked by (Patient RN) Angelina Foster RN  -LR       Checked by (Witness) Kendal Villanueva RN  -AYO       Product Volume Infused (mL) 80 mL  -AD       Flush Volume (mL) 60 mL  -AD       Volume Dispensed (mL) --                [REMOVED] Product 11/11/21 0922  HPC, Apheresis    Product Details Product Release Date: 11/11/21  -JL Product Release Time: 0912 -JL Product Type: HPC, Apheresis  - DIN: [id number]  -JL Product Description Code: [id number]  -JL Volume Dispensed (mL): 80 mL  -JL Completion Date (RN to complete): 11/11/21  -AD Completion Time (RN to complete): 1531  -AD       Checked by (Patient RN) Kendal Villanueva RN  -AD       Checked by (Witness) Angelina Foster RN  -LR       Product Volume Infused (mL) 80 mL  -AD       Flush Volume (mL) 80 mL  -AD       Volume Dispensed (mL) --             User Key  (r) = Recorded By, (t) = Taken By, (c) = Cosigned By    Initials Name Effective Dates    Kendal Castaneda RN 09/16/20 -     Amanda Browne 07/11/21 -     Angelina Arana RN 04/29/14 -               Preparation: RN Documentation  Patient was premedicated as ordered: yes  Line Type: central line, left  Patient Stable Prior to Infusion: yes  Time Infusion Started: 1425  Teaching: side effects/monitoring  Tolerated/Reaction: Patient tolerance of product infusion  Immediate suspected transfusion reaction to the product: none  Did patient have prior history of similar signs/symptoms during this hospitalization?: no  Symptoms during/after infusion: other (comment) (none)  Did the patient tolerate the infusion well: yes  Medications and treatment for symptoms: none  Did the symptoms resolve?:  (n/a)  Enter comments if clots, leaks, broken bag, infusion delays, other issues with bag/infusion:  (n/a)  Flush until: NS @150 until 2300  Plan: Continue to monitor

## 2021-11-11 NOTE — SUMMARY OF CARE
[facility] Summary of Care   & Survivorship Care Plan    Treatment Team:  Patient Care Team:  Selma Johnson APRN CNP as PCP - General (Nurse Practitioner - Family)  Jose Ronquillo MD as MD (Dermatology)  Selma Johnson APRN CNP as Assigned PCP  Barbi Vazquez MD as MD (Hematology & Oncology)  hSo Mckeon, RN as Specialty Care Coordinator (Hematology & Oncology)  Lazaro Rayo MD as MD (Dermapathology)  Barbi Vazquez MD as Assigned Cancer Care Provider  Makeda Elder MD as MD (Endocrinology, Diabetes, and Metabolism)  Tato Dominique DO as Assigned Musculoskeletal Provider  Christine Reynolds RN as BMT Nurse Coordinator  Nasim Means MD as BMT Physician (Transplant)  Christine Reynolds RN as BMT Nurse Coordinator  Ema Antunez LIC as  (BMT - Adult)  Romulo Ceja MD as Assigned Heart and Vascular Provider  Makeda Elder MD as Assigned Endocrinology Provider  Cassie García MD as BMT Physician (Hematology & Oncology)  Discharge Diagnosis: S/P BMT    Transplant Essential Data:  Diagnosis MM Multiple myeloma  HCT Type Autologous    Prep Regimen melphalan  Donor Source Autologous    GVHD Prophylaxis None  Clinical Trials none       Hospital Discharge on 11/12/21  Discharge Location Home   Activity at Discharge As tolerated   Nutrition Regular diet as tolerated     Blood Transfusion Parameters Transfuse if Hemoglobin < or equal 7 mg/dL  Red Blood Cell Order: 1 units, irradiated and leukoreduced   Transfuse if Platelet count < or equal 10,000 uL  Platelet order: 1 adult dose, irradiated and leukoreduced  Transfusion Pre-meds:  None     IV Medications Outpatient Pharmacy G-CSF to be given in clinic: (dose) 480mcg daily starting D+5 (11/16) and continue daily until ANC > 2500 x 2 days     Home Infusion  IV Medications through home infusion: None   Line Care Care: [place] - Flush each line with 5mL of 10 unit/mL heparin every 24 hours  Supplies  Through: [facility]  Fax: [phone]  Ph: [phone]     Please deliver aquaguard shower covers for his CVC     Physical Therapy & Support Services None     Laboratory Tests at Next Clinic Visit Hemogram (CBC) differential, platelet count  Basic Metabolic Panel     Restrictions Immediately Post-Transplant   Always wear your mask in public.    No driving until cleared by your physician    Continue dietary precautions as discussed at your pre-BMT teaching session   When to Call  (Refer to Discharge Teaching Materials)   Fever > 100.5 F    Bleeding that does not stop after a few minutes    Severe nausea, vomiting, or diarrhea     New fall or injury    Change in designated caregiver    Medication question    Any other urgent concern   Follow Up Visits Clinic Appointment Date/Time:   1. Saturday, 11/13 check in at 8:45am for labs and 9:30am for provider visit    Survivorship Visits:     You will have a 60-minute provider visit dedicated to cancer Survivorship one week before your scheduled anniversary visit on 1 year, and 2 years.     Your labs will be drawn after your survivorship appointments to allow your provider to order additional tests as needed.     BMT Contact Information  For issues including fevers 100.5 or more:  Please call during the week: Monday through Friday between hours of 8:00 am and 4:30 pm- Call BMT office- [phone]  After hours/Weekends: Please call [facility]  and ask for BMT Physician on call and the  will have the BMT Physician call you back: [phone]    Regarding COVID-19 Pandemic  Advice for Patients:  a.       Avoid contact with individuals:   i.     Who are sick or have recently been sick  ii.      Have traveled to high risk areas (per CDC guidelines) or have been on a cruise in the last 14 days  iii.      Who were or could have been exposed to COVID-19   b.       If experiencing symptoms such as: Fever, cough or shortness  of breath contact BMT at [phone] Mon-Fri 8am-4:30pm or After Hours at [phone] (ask to speak to a BMT Fellow) for guidance on need for clinical assessment  c.      Avoid all non- essential travel at this time; if traveling is necessary use mask (N-95)   d.    Wear a mask when in public areas  d.       Avoid crowded places, if possible  f.        Follow CDC advice https://www.cdc.gov/coronavirus/2019-ncov/index.html and travel guidelines https://www.cdc.gov/coronavirus/2019-ncov/travelers/index.html

## 2021-11-11 NOTE — SUMMARY OF CARE
Discharge Medications    Medication changes:  1. Stop taking hydrochlorothiazide   2. Stop taking rosuvastatin  3. Stop taking aspirin  4. Stop taking fish oil    5. Increase acyclovir dose from 400mg to 800mg twice daily  6. Increase gabapentin in the evening from 300 to 600mg    7. Start taking B complex vitamin about 2 weeks post transplant (around 11/24)       Review of your medicines      START taking      Dose / Directions   fluconazole 200 MG tablet  Commonly known as: DIFLUCAN  Indication: Candidiasis Prophylaxis  Used for: Multiple myeloma not having achieved remission (H)      Dose: 200 mg  Take 1 tablet (200 mg) by mouth daily  Quantity: 30 tablet  Refills: 1     levofloxacin 250 MG tablet  Commonly known as: LEVAQUIN  Indication: Bacterial Prophylaxis while Neutropenic  Used for: Multiple myeloma not having achieved remission (H)      Dose: 250 mg  Take 1 tablet (250 mg) by mouth daily  Quantity: 30 tablet  Refills: 1     ondansetron 8 MG tablet  Commonly known as: ZOFRAN  Used for: Multiple myeloma not having achieved remission (H)      Dose: 8 mg  Take 1 tablet (8 mg) by mouth every 8 hours as needed for nausea  Quantity: 60 tablet  Refills: 1     vitamin B complex with vitamin C tablet  Used for: Multiple myeloma not having achieved remission (H)      Dose: 1 tablet  Take 1 tablet by mouth daily *do not start until 2 weeks post transplant (11/24)  Quantity: 30 tablet  Refills: 1        CONTINUE these medicines which may have CHANGED, or have new prescriptions. If we are uncertain of the size of tablets/capsules you have at home, strength may be listed as something that might have changed.      Dose / Directions   acyclovir 800 MG tablet  Commonly known as: ZOVIRAX  Indication: Varicella Zoster prophylaxis  This may have changed:     medication strength    how much to take  Used for: Multiple myeloma not having achieved remission (H)      Dose: 800 mg  Take 1 tablet (800 mg) by mouth 2 times  daily  Quantity: 60 tablet  Refills: 3     gabapentin 300 MG capsule  Commonly known as: NEURONTIN  This may have changed:     how much to take    how to take this    when to take this    additional instructions  Used for: Multiple myeloma not having achieved remission (H)      Take 1 capsule (300mg) in the morning and 2 capsules (600mg) in the evening  Quantity: 90 capsule  Refills: 1     pantoprazole 40 MG EC tablet  Commonly known as: PROTONIX  This may have changed:     medication strength    how much to take  Used for: Multiple myeloma not having achieved remission (H), Stomach upset      Dose: 40 mg  Take 1 tablet (40 mg) by mouth daily  Quantity: 30 tablet  Refills: 0        CONTINUE these medicines which have NOT CHANGED      Dose / Directions   calcium carb-cholecalciferol 500-200 MG-UNIT tablet  Commonly known as: OS-HARDEEP  Used for: Multiple myeloma not having achieved remission (H)      Dose: 1 tablet  Take 1 tablet by mouth daily  Quantity: 30 tablet  Refills: 1     levothyroxine 50 MCG tablet  Commonly known as: SYNTHROID/LEVOTHROID  Used for: Hypothyroidism due to Hashimoto's thyroiditis      Dose: 50 mcg  Take 1 tablet (50 mcg) by mouth daily  Quantity: 90 tablet  Refills: 1     losartan 50 MG tablet  Commonly known as: COZAAR  Used for: Coronary artery disease involving native coronary artery of native heart without angina pectoris      Dose: 50 mg  Take 1 tablet (50 mg) by mouth daily  Quantity: 90 tablet  Refills: 2     potassium chloride ER 10 MEQ CR tablet  Commonly known as: KLOR-CON M  Used for: Multiple myeloma not having achieved remission (H)      Dose: 20 mEq  Take 2 tablets (20 mEq) by mouth daily  Quantity: 30 tablet  Refills: 3     prochlorperazine 10 MG tablet  Commonly known as: COMPAZINE  Used for: Multiple myeloma not having achieved remission (H)      Dose: 10 mg  Take 1 tablet (10 mg) by mouth every 6 hours as needed (Nausea/Vomiting)  Quantity: 30 tablet  Refills: 5        STOP  taking    aspirin 81 MG EC tablet  Commonly known as: ASA        fish oil-omega-3 fatty acids 1000 MG capsule        hydrochlorothiazide 50 MG tablet  Commonly known as: HYDRODIURIL        LENalidomide 25 MG Caps capsule  Commonly known as: REVLIMID        order for DME        rosuvastatin 40 MG tablet  Commonly known as: CRESTOR              Where to get your medicines      These medications were sent to [facility] - [place], [place] - [address] [address]    Phone: [phone]     acyclovir 800 MG tablet    fluconazole 200 MG tablet    gabapentin 300 MG capsule    levofloxacin 250 MG tablet    ondansetron 8 MG tablet    pantoprazole 40 MG EC tablet    vitamin B complex with vitamin C tablet

## 2021-11-11 NOTE — PLAN OF CARE
"/86 (BP Location: Left arm)   Pulse 104   Temp 97.9  F (36.6  C) (Oral)   Resp 18   Ht 1.645 m (5' 4.76\")   Wt 74 kg (163 lb 2.3 oz)   SpO2 100%   BMI 27.35 kg/m      VSS - slightly tachy overnight d/t nausea and some anxiety about transplant today. Ativan x1 with relief of symptoms. Pt showered and used CHG soap before bed. 20mEq of K+ and 2g Mag replaced - recheck tomorrow AM. Plan for transplant today at 0215.     Problem: Adult Inpatient Plan of Care  Goal: Plan of Care Review  Outcome: No Change     Problem: Nausea and Vomiting (Stem Cell/Bone Marrow Transplant)  Goal: Nausea and Vomiting Symptom Relief  Outcome: Declining     "

## 2021-11-11 NOTE — PROGRESS NOTES
"   11/11/21 1419   Quick Adds   Type of Visit Initial Occupational Therapy Evaluation   Living Environment   People in home spouse   Current Living Arrangements house   Home Accessibility stairs within home   Number of Stairs, Within Home, Primary greater than 10 stairs  (14)   Stair Railings, Within Home, Primary railings on both sides of stairs   Transportation Anticipated family or friend will provide   Living Environment Comments Pt reporting living in two story home with 14 stairs to get to bedroom and bathroom. Pt reporting walk-in and tub shower, though typically uses walk-in shower. Pt IND at baseline, reporting recently family has been supportive in \"supervising\" exercise to increase Pt safety. Pt works for [place] [employer] and continues to work during treatment.    Self-Care   Usual Activity Tolerance excellent   Current Activity Tolerance good   Regular Exercise Yes   Activity/Exercise Type walking   Exercise Amount/Frequency daily;30 mins   Equipment Currently Used at Home none   Activity/Exercise/Self-Care Comment Pt reporting walking on treadmill 30mins/day, IND with ADLs and IADLs, however due to fatigue/pain w/broken ribs, has not been able to do \"everything I want to do\"    Instrumental Activities of Daily Living (IADL)   Previous Responsibilities work;driving;finances;medication management;yardwork;meal prep   IADL Comments Pt reporting family able to assist, currently working remotely.    Disability/Function   Hearing Difficulty or Deaf no   Wear Glasses or Blind no   Concentrating, Remembering or Making Decisions Difficulty no   Difficulty Communicating no   Difficulty Eating/Swallowing no   Walking or Climbing Stairs Difficulty no   Dressing/Bathing Difficulty no   Toileting issues no   Doing Errands Independently Difficulty (such as shopping) no   Fall history within last six months no   Change in Functional Status Since Onset of Current Illness/Injury yes   General Information   Onset of " Illness/Injury or Date of Surgery 11/10/21   Referring Physician Bibi Delgado PA-C   Patient/Family Therapy Goal Statement (OT) To return to his active life   Additional Occupational Profile Info/Pertinent History of Current Problem Per chart June Ronquillo is a 51 year old male with high risk multiple myeloma, D0 auto BMT   Existing Precautions/Restrictions immunosuppressed   Cognitive Status Examination   Orientation Status orientation to person, place and time   Sensory   Sensory Comments Pt reporting neuropathy in BLEs   Posture   Posture not impaired   Range of Motion Comprehensive   General Range of Motion bilateral upper extremity ROM WNL   Strength Comprehensive (MMT)   General Manual Muscle Testing (MMT) Assessment no strength deficits identified   Coordination   Upper Extremity Coordination No deficits were identified   Bed Mobility   Bed Mobility No deficits identified   Transfers   Transfers No deficits identified   Clinical Impression   Criteria for Skilled Therapeutic Interventions Met (OT) yes;meets criteria   OT Diagnosis Pt is below baseline for IADLs and at increased risk of deconditioning.    OT Problem List-Impairments impacting ADL activity tolerance impaired;pain   Assessment of Occupational Performance 1-3 Performance Deficits   Identified Performance Deficits home management, leisure, work    Planned Therapy Interventions (OT) IADL retraining;progressive activity/exercise;home program guidelines;risk factor education;strengthening   Clinical Decision Making Complexity (OT) low complexity   Therapy Frequency (OT) 4x/week   Predicted Duration of Therapy 2 days    Anticipated Equipment Needs Upon Discharge (OT)   (tbd)   Risk & Benefits of therapy have been explained evaluation/treatment results reviewed;care plan/treatment goals reviewed;risks/benefits reviewed;current/potential barriers reviewed;participants voiced agreement with care plan;participants included;patient   OT  Discharge Planning    OT Discharge Recommendation (DC Rec) Home with assist;home with outpatient occupational therapy   OT Rationale for DC Rec Anticipating pt will be safe to d/c home with A for heavy IADLs. Pt may benefit from OP OT cancer rehab to progress endurance and provide additional strategies for fatigue management.    OT Brief overview of current status  IND

## 2021-11-11 NOTE — CONSULTS
Below is the pt's psychosocial assessment for the staff to review as needed.      CLINICAL SOCIAL WORK   PSYCHOSOCIAL ASSESSMENT  BLOOD AND MARROW TRANSPLANT SERVICE        Assessment completed on 2021 of living situation, support system, financial status, functional status, coping, stressors, need for resources and social work intervention provided as needed.  Information for this assessment was provided by Pt report in addition to medical chart review and consultation with medical team.      Present at assessment: Patient, June Ronquillo was present for this assessment conducted by BONY Farrar .      Diagnosis: Multiple Myeloma (MM)     Date of Diagnosis: 21     Transplant type: Autologous     Donor: Autologous      Physician: Nasim Means MD     Nurse Coordinator: Lorelei Medina RN     : IRASEMA Farrar, [facility]      Permanent Address:   [address] [place] [id number]     Contact Information:  Pt Home Phone: [phone]  Pt Cell Phone: [phone]  Pt Email: [email or web address]  Pt's wife Ginette's  Phone: [phone]     Presenting Information:  June is a 51 year old male diagnosed with MM who presents for evaluation for an autologous transplant at the [facility] ([facility]).  Pt was alone for today's telephone visit.      Decision Making:   Self      Health Care Directive:   Patient considering completing      Relationship Status:    to his wife Ginette for 25 years. He notes their relationship as stable and supportive.     Special Lodging Needs: None identified at this time.      Family/Support System: Pt endorsed a good support system including family and close friends who will be available to support Pt throughout transplant process.      Spouse: Ginette Ronquillo     Children: Hermilo (24) and Blade (22)     Grandchildren: 1 grandchild who is 2     Parents:      Siblings: 2 sisters who live in  [place]     Friends: Many close friends and neighbors who are supportive     Caregiver: SW discussed with pt the caregiver role and expectation at length. Pt is agreeable to having a full time caregiver for a minimum of 30 days until cleared by the BMT physician. Pt's identified caregivers are his wife and children. Pt signed the caregiver contract which will be scanned into the EMR. Caregiver education and resources provided. No caregiver concerns identified. Pt and Pt's wife confirmed understanding caregiving requirement, including driving restrictions, as discussed during psychosocial assessment.      Name & Numbers  Ginette Ronquillo     Transportation Mode:  Private Car . Pt is aware of driving restrictions post-BMT and the need for the caregiver is to drive until cleared to drive by the  BMT physician. SW provided information on parking info and monthly parking pass options. Pt will utilize the [employer] security shuttle for transportation to and from the [facility] and BMT Clinic/Hospital.     Insurance:  No Insurance issues identified.  Pt denied specific insurance concerns at this time. SW reiterated information about the BMT Financial  should specific insurance questions arise as Pt moves through transplant process.      Sources of Income:  No income concerns identified  June is currently working and collecting his salary. Pt denied anticipation of financial hardship related to BMT at this time.  SW encouraged Pt to contact this SW for additional potential resources should financial situation change.      Employment:   Employer: [employer]  Position: /  Last Day of Work: still working virtually     Spouse's Employment:  Employer:  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  GAD7 assessment, score was 0,  "which indicates no current signs of anxiety.      We talked about how some patients may see an increase in feelings of anxiety or depression while hospitalized for extended periods along with isolation. Encouraged June to let us know if they are noticing an increase in symptoms. We talked about the variety of modalities available to use as coping mechanisms (including but not limited to guided imagery, relaxation techniques, progressive muscle relaxation, counseling/talk therapy and medication).     Chemical Use: No issues identified.  June denied the use of tobacco, alcohol, marijuana or other drugs. Based on the information provided, there appear to be no specific risks or concerns identified at this time.      Trauma/Loss/Abuse History: Multiple losses associated with cancer diagnosis and treatment, including health, employment, changes to physical appearance, etc.      Spirituality:  Patient identifies with helene community. June shared that he is spiritual and finds growth in this spiritual practices. He was raised in both in the Latter-day and Zoroastrian faiths.      Coping: Pt noted that he is currently feeling \"ready to go, but having a little fear of the unknown\".  Pt shared that his main coping mechanisms are cooking , drumming and gardening.  Pt noted that he also manjinder by telling stories and travel. SW and Pt discussed additional positive coping mechanisms that Pt can utilize while in the hospital.      Caregiver Coping: Not present for assessment     Education Provided: Transplant process expectations, Caregiver requirements, Caregiver self-care, Financial issues related to transplant, Financial resources/grants available, Common psychosocial stressors pre/post transplant, Support group(s) available, Tour/layout of the inpatient unit/non-use of cell phones, Hospital resources available, Web site information, Resources for transplant patients and their families as well as the Clinical Social " Work role.      Interventions Provided: Supportive counseling and education      Recreation/Leisure Activities:  June shared he enjoys cooking, traveling, gardening and drumming     Plans for Hospital Stay Leisure:  While IP he plans to read, play games and use the Internet.      Assessment and Recommendations for Team:  Pt is a 51 year old male diagnosed with MM who is here undergoing preparation for a planned autologous transplant.      Pt is a pleasant and articulate male who feels comfortable communicating with the medical team. Pt has a good support team who are involved.      Pt has developed strong coping mechanisms such as; as story telling and his spiritual practice. 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      Pt may benefit from ongoing psychosocial support in regards to coping with the adjustment to the BMT process. Pt's family may benefit from ongoing psychosocial support in regards to coping with the adjustment to the BMT process and may also benefit from attending the BMT Caregiver Support Group that meets weekly on the inpatient unit.      Pt has a good support system and a good caregiver plan. Pt verbalizes understanding of the transplant process and wanting to proceed. SW provided contact information and encouraged Pt to contact SW with questions, concerns, resources and for support. Per this assessment, I did not identify any barriers to this patient moving forward with transplant        Important Information:   - June's family will be his caregivers.     Follow up Planned:   Psychosocial support        IRASEMA Farrar, [facility]  Pager: [phone]  Phone: [phone]

## 2021-11-11 NOTE — PROGRESS NOTES
Patient admitted to:  rm 7  Admitted from: home  Arrived by: ambulatory  Reason for admission: transplant  Patient accompanied by: self  Belongings: with pt  Teaching: per unit routine  Skin double check completed by: Donna Restrepo RN. Small laceration on head from bumping it on the cupboard this am.

## 2021-11-11 NOTE — DISCHARGE SUMMARY
[facility] Discharge Summary   June Ronquillo MRN# [id number]   Age: 51 year old  YOB: 1970   Date of Admission: 11/10/2021  Date of Discharge:  11/12/2021  Admitting Physician: Brandon Rendon MD  Discharge Physician: Brandon Rendon MD  Discharge Diagnoses:    1. S/p autologous BMT for multiple myeloma  2. Peripheral neuropathy  3. Hypertension  4. Chemotherapy induced nausea  Discharge Medications:         Review of your medicines      START taking      Dose / Directions   fluconazole 200 MG tablet  Commonly known as: DIFLUCAN  Indication: Candidiasis Prophylaxis  Used for: Multiple myeloma not having achieved remission (H)      Dose: 200 mg  Take 1 tablet (200 mg) by mouth daily  Quantity: 30 tablet  Refills: 1     levofloxacin 250 MG tablet  Commonly known as: LEVAQUIN  Indication: Bacterial Prophylaxis while Neutropenic  Used for: Multiple myeloma not having achieved remission (H)      Dose: 250 mg  Take 1 tablet (250 mg) by mouth daily  Quantity: 30 tablet  Refills: 1     ondansetron 8 MG tablet  Commonly known as: ZOFRAN  Used for: Multiple myeloma not having achieved remission (H)      Dose: 8 mg  Take 1 tablet (8 mg) by mouth every 8 hours as needed for nausea  Quantity: 60 tablet  Refills: 1     vitamin B complex with vitamin C tablet  Used for: Multiple myeloma not having achieved remission (H)      Dose: 1 tablet  Take 1 tablet by mouth daily *do not start until 2 weeks post transplant (11/24)  Quantity: 30 tablet  Refills: 1        CONTINUE these medicines which may have CHANGED, or have new prescriptions. If we are uncertain of the size of tablets/capsules you have at home, strength may be listed as something that might have changed.      Dose / Directions   acyclovir 800 MG tablet  Commonly known as: ZOVIRAX  Indication: Varicella Zoster prophylaxis  This may have changed:     medication strength    how much to take  Used for: Multiple myeloma not  having achieved remission (H)      Dose: 800 mg  Take 1 tablet (800 mg) by mouth 2 times daily  Quantity: 60 tablet  Refills: 3     gabapentin 300 MG capsule  Commonly known as: NEURONTIN  This may have changed:     how much to take    how to take this    when to take this    additional instructions  Used for: Multiple myeloma not having achieved remission (H)      Take 1 capsule (300mg) in the morning and 2 capsules (600mg) in the evening  Quantity: 90 capsule  Refills: 1     pantoprazole 40 MG EC tablet  Commonly known as: PROTONIX  This may have changed:     medication strength    how much to take  Used for: Multiple myeloma not having achieved remission (H), Stomach upset      Dose: 40 mg  Take 1 tablet (40 mg) by mouth daily  Quantity: 30 tablet  Refills: 0        CONTINUE these medicines which have NOT CHANGED      Dose / Directions   calcium carb-cholecalciferol 500-200 MG-UNIT tablet  Commonly known as: OS-HARDEEP  Used for: Multiple myeloma not having achieved remission (H)      Dose: 1 tablet  Take 1 tablet by mouth daily  Quantity: 30 tablet  Refills: 1     levothyroxine 50 MCG tablet  Commonly known as: SYNTHROID/LEVOTHROID  Used for: Hypothyroidism due to Hashimoto's thyroiditis      Dose: 50 mcg  Take 1 tablet (50 mcg) by mouth daily  Quantity: 90 tablet  Refills: 1     losartan 50 MG tablet  Commonly known as: COZAAR  Used for: Coronary artery disease involving native coronary artery of native heart without angina pectoris      Dose: 50 mg  Take 1 tablet (50 mg) by mouth daily  Quantity: 90 tablet  Refills: 2     potassium chloride ER 10 MEQ CR tablet  Commonly known as: KLOR-CON M  Used for: Multiple myeloma not having achieved remission (H)      Dose: 20 mEq  Take 2 tablets (20 mEq) by mouth daily  Quantity: 30 tablet  Refills: 3     prochlorperazine 10 MG tablet  Commonly known as: COMPAZINE  Used for: Multiple myeloma not having achieved remission (H)      Dose: 10 mg  Take 1 tablet (10 mg) by mouth  every 6 hours as needed (Nausea/Vomiting)  Quantity: 30 tablet  Refills: 5        STOP taking    aspirin 81 MG EC tablet  Commonly known as: ASA        fish oil-omega-3 fatty acids 1000 MG capsule        hydrochlorothiazide 50 MG tablet  Commonly known as: HYDRODIURIL        LENalidomide 25 MG Caps capsule  Commonly known as: REVLIMID        order for DME        rosuvastatin 40 MG tablet  Commonly known as: CRESTOR              Where to get your medicines      These medications were sent to [facility] - [place], [place] - [address] [address]    Phone: [phone]     acyclovir 800 MG tablet    fluconazole 200 MG tablet    gabapentin 300 MG capsule    levofloxacin 250 MG tablet    ondansetron 8 MG tablet    pantoprazole 40 MG EC tablet    vitamin B complex with vitamin C tablet         Brief History of Illness:    **Adopted from H&P  June Ronquillo is a 50yo M with high risk multiple myeloma. He initially had symptoms with spontaneous rib fracture about two years ago. Then again had a rib fracture which led to a workup and diagnosis of myeloma.      He had 6 cycles of RVD with daratumumab between May and October 2021. He does have peripheral neuropathy as a result of chemotherapy.     He collected well via peripheral collections with cell yield of 6.99x10^6.     During workup, he was found to have hypermetabolic thyroid nodules. Biopsy confirmed papillary thyroid carcinoma. Long term plan is for thyroid resection.     Today he is overall feeling well. No infectious concerns. Recently started gabapentin for neuropathy which is helping, and would also like to start a B complex vitamin. He did hit his head on a cabinet this morning while making breakfast. There is a small laceration on top of his head, but no active bleeding.    Hospital Course:    June Ronquillo was admitted on 11/10 for transplant. He developed nausea without emesis after melphalan.  His transplant went well. He did have a headache the following morning. He is ready for discharge to home today.    1. BMT  - prep with melphalan on D-1, transplant on 11/11  - Cell dose 6.99 x 10^6; gave all cells due to high risk disease  - GCSF starts day +5, continue until ANC > 2500 for 2 consecutive days.   - BMT doc/Coordinator: Miguelangel/Du Newsome; Loreeli/Christine     2. HEME/COAG  - Transfusion parameters: hgb <7g/dL and plts <10,000  - anticipate cytopenias due to chemotherapy  - leukocytosis from steroids     3. ID  - No active infections.  - Due to tachycardia and leukocytosis, a lactic was drawn and was 2.1. Patient was evaluated and clinically well. Blood cultures were drawn. A repeat lactic came back at 2.6. He received 500cc NS and will be discharged without a recheck due to clinical stability     Prophy:  Viral: ACV BID (CMV+)  Bacterial: levofloxacin 250mg daily  Fungal: fluc  PCP: Bactrim or other PCP prophy to start at day +28     4. GI/NUTRITION  - s/p antiemetics pre chemo. Send home with prn zofran  - Ulcer prophy: Protonix 40mg daily.      5. CV  - Hx of CAD, MI at age 28 with stent placed  - HTN: cont losartan. Hold hydrochlorothiazide through transplant  - Hyperlipidemia: hold statin through transplant     5. RENAL/  - Monitor Cr and electrolytes daily.   - Replace electrolytes per sliding scale     6. ENDOCRINE  - hx hypothyroidism  - new diagnosis of papillary thyroid carcinoma per bx result from 11/2, appears that the plan is for thyroidectomy after BMT     7. SUPPORTIVE CARE  - PT/OT to evaluate on admission and follow as indicated.      8. Neuro  - peripheral neuropathy: started gabapentin 300mg BID, increase pm dose to 600mg per patient request  - start B complex vitamin (contains vit C) about 2 weeks post transplant to avoid interaction with chemo    Discharge Instructions and Follow-Up:    Discharge diet: Regular diet as tolerated  Discharge activity: Activity as tolerated   Discharge  follow-up: Follow up with BMT Clinic as follows:  1. Saturday, 11/13 check in at 8:45am for labs and 9:30am for provider visit    Discharge Disposition:    Discharged to home.    Bibi Delgado PA-C  [phone]

## 2021-11-11 NOTE — PROCEDURES
After verifying patient identity and given pre-medication and IV fluid flush the patient received an autologous hematopoietic stem cell transplant through her central line. The patient tolerated the procedure well with no immediate severe complications. I was present for the critical steps of this procedure and I was available afterwards.    Brandon Rendon M.D.

## 2021-11-11 NOTE — PROCEDURES
BMT/Cellular Autologous Product Infusion         Patient Vitals for the past 24 hrs:   Temp Temp src Pulse Resp BP   11/10/21 1204 97.7  F (36.5  C) Axillary 84 16 (!) 145/95   11/10/21 1629 98.2  F (36.8  C) Oral 80 16 135/89   11/10/21 2028 98  F (36.7  C) Oral 100 16 (!) 149/91   11/11/21 0000 98.3  F (36.8  C) Oral 109 16 130/86   11/11/21 0400 97.9  F (36.6  C) Oral 104 18 --   11/11/21 0834 98.2  F (36.8  C) Oral 107 14 117/70      BMT INFUSION DOCUMENTATION (last 48 hours)     BMT/Cellular Product Infusion     Row Name                  Product 11/11/21 0912 [facility], Apheresis    Product Details Product Release Date: 11/11/21 - Product Release Time: 0912 -JL Product Type: HPC, Apheresis  -JL DIN: [id number]  - Product Description Code: [id number]  -JL Volume Dispensed (mL): 80 mL  -JL       Checked by (Patient RN) --       Checked by (Witness) --       Product Volume Infused (mL) --       Flush Volume (mL) --       Volume Dispensed (mL) --                Product 11/11/21 0912 [facility], Apheresis    Product Details Product Release Date: 11/11/21 - Product Release Time: 0912 -JL Product Type: HPC, Apheresis  -JL DIN: [id number]  - Product Description Code: [id number]  -JL Volume Dispensed (mL): 80 mL  -JL       Checked by (Patient RN) --       Checked by (Witness) --       Product Volume Infused (mL) --       Flush Volume (mL) --       Volume Dispensed (mL) --                Product 11/11/21 0912 [facility], Apheresis    Product Details Product Release Date: 11/11/21 -JL Product Release Time: 0912 -JL Product Type: HPC, Apheresis  -JL DIN: [id number]  -JL Product Description Code: [id number]  -JL Volume Dispensed (mL): 80 mL  -JL       Checked by (Patient RN) --       Checked by (Witness) --       Product Volume Infused (mL) --       Flush Volume (mL) --       Volume Dispensed (mL) --                Product 11/11/21 0912 [facility], Apheresis    Product Details Product Release Date: 11/11/21  - Product Release  Time: 0912 -JL Product Type: HPC, Apheresis  - DIN: [id number]  - Product Description Code: [id number]  - Volume Dispensed (mL): 80 mL  -JL       Checked by (Patient RN) --       Checked by (Witness) --       Product Volume Infused (mL) --       Flush Volume (mL) --       Volume Dispensed (mL) --             User Key  (r) = Recorded By, (t) = Taken By, (c) = Cosigned By    Initials Name Effective Dates    TAMARA BazziAmanda clayton RODNEY 07/11/21 -               Allogeneic Donor Eligibility Determination and Summary of Records: N/A - Autologous        Type of Infusion: Autologous      Baseline Pre-Infusion Evaluation (to be completed by Provider):   Dyspnea: Grade 0 - none  Hypoxia: Grade 0 - not present  Fever: Grade 0 - afebrile  Chills: Grade 0 - none  Febrile Neutropenia: Grade 0 - not present  Sinus Bradycardia: Grade 0 - none  Hypertension: Grade 0 - none  Hypotension: Grade 0 - none  Chest Pain: Grade 0 - none  Bronchospasm: Grade 0 - none  Pain: Grade 0 - none  Rash: Grade 0 - None  Neurologic Specify: none    If adverse reactions, events or complications occur (fever greater than 2 degrees fahrenheit increase, and severe reactions of the following types: chills, dyspnea, bronchospasm, hyper/hypotension, hypoxia, bradycardia, chest pain, back/flank pain, hypoxia, and any other reaction deemed severe or life threatening; any instance of product bag breakage or unusual product appearance)    Any other events that are >= grade 3, then immediately contact the BMT Attending physician, the Cell Therapy Laboratory Medical Director (pager [phone]) and the Cell Therapy Laboratory ([phone]).  After midnight, holidays & weekends contact the LTAC, located within [facility] on the appropriate campus ([facility], located [facility] [place]: [phone]; LTAC, located within [facility] [place]: [phone]).    Bibi Delgado PA-C

## 2021-11-12 ENCOUNTER — HOME INFUSION (PRE-WILLOW HOME INFUSION) (OUTPATIENT)
Dept: PHARMACY | Facility: CLINIC | Age: 51
End: 2021-11-12
Payer: COMMERCIAL

## 2021-11-12 ENCOUNTER — APPOINTMENT (OUTPATIENT)
Dept: OCCUPATIONAL THERAPY | Facility: CLINIC | Age: 51
DRG: 016 | End: 2021-11-12
Attending: INTERNAL MEDICINE
Payer: COMMERCIAL

## 2021-11-12 ENCOUNTER — CARE COORDINATION (OUTPATIENT)
Dept: ONCOLOGY | Facility: CLINIC | Age: 51
End: 2021-11-12
Payer: COMMERCIAL

## 2021-11-12 VITALS
RESPIRATION RATE: 18 BRPM | DIASTOLIC BLOOD PRESSURE: 83 MMHG | BODY MASS INDEX: 27.4 KG/M2 | HEART RATE: 103 BPM | OXYGEN SATURATION: 100 % | TEMPERATURE: 97.9 F | WEIGHT: 164.46 LBS | HEIGHT: 65 IN | SYSTOLIC BLOOD PRESSURE: 132 MMHG

## 2021-11-12 LAB
ALBUMIN UR-MCNC: NEGATIVE MG/DL
ANION GAP SERPL CALCULATED.3IONS-SCNC: 8 MMOL/L (ref 3–14)
APPEARANCE UR: CLEAR
BASOPHILS # BLD MANUAL: 0 10E3/UL (ref 0–0.2)
BASOPHILS NFR BLD MANUAL: 0 %
BILIRUB UR QL STRIP: NEGATIVE
BUN SERPL-MCNC: 20 MG/DL (ref 7–30)
CALCIUM SERPL-MCNC: 7.8 MG/DL (ref 8.5–10.1)
CHLORIDE BLD-SCNC: 108 MMOL/L (ref 94–109)
CO2 SERPL-SCNC: 22 MMOL/L (ref 20–32)
COLOR UR AUTO: NORMAL
CREAT SERPL-MCNC: 0.85 MG/DL (ref 0.66–1.25)
EOSINOPHIL # BLD MANUAL: 0 10E3/UL (ref 0–0.7)
EOSINOPHIL NFR BLD MANUAL: 0 %
ERYTHROCYTE [DISTWIDTH] IN BLOOD BY AUTOMATED COUNT: 14.9 % (ref 10–15)
GFR SERPL CREATININE-BSD FRML MDRD: >90 ML/MIN/1.73M2
GLUCOSE BLD-MCNC: 146 MG/DL (ref 70–99)
GLUCOSE UR STRIP-MCNC: NEGATIVE MG/DL
HCT VFR BLD AUTO: 30.6 % (ref 40–53)
HGB BLD-MCNC: 10.3 G/DL (ref 13.3–17.7)
HGB UR QL STRIP: NEGATIVE
KETONES UR STRIP-MCNC: NEGATIVE MG/DL
LACTATE SERPL-SCNC: 2.1 MMOL/L (ref 0.7–2)
LACTATE SERPL-SCNC: 2.6 MMOL/L (ref 0.7–2)
LEUKOCYTE ESTERASE UR QL STRIP: NEGATIVE
LYMPHOCYTES # BLD MANUAL: 0 10E3/UL (ref 0.8–5.3)
LYMPHOCYTES NFR BLD MANUAL: 0 %
MAGNESIUM SERPL-MCNC: 2.5 MG/DL (ref 1.6–2.3)
MCH RBC QN AUTO: 31.2 PG (ref 26.5–33)
MCHC RBC AUTO-ENTMCNC: 33.7 G/DL (ref 31.5–36.5)
MCV RBC AUTO: 93 FL (ref 78–100)
MONOCYTES # BLD MANUAL: 0.7 10E3/UL (ref 0–1.3)
MONOCYTES NFR BLD MANUAL: 4 %
NEUTROPHILS # BLD MANUAL: 16.9 10E3/UL (ref 1.6–8.3)
NEUTROPHILS NFR BLD MANUAL: 96 %
NITRATE UR QL: NEGATIVE
PH UR STRIP: 6.5 [PH] (ref 5–7)
PLAT MORPH BLD: ABNORMAL
PLATELET # BLD AUTO: 279 10E3/UL (ref 150–450)
POTASSIUM BLD-SCNC: 4 MMOL/L (ref 3.4–5.3)
RBC # BLD AUTO: 3.3 10E6/UL (ref 4.4–5.9)
RBC MORPH BLD: ABNORMAL
RBC URINE: 0 /HPF
SODIUM SERPL-SCNC: 138 MMOL/L (ref 133–144)
SP GR UR STRIP: 1.01 (ref 1–1.03)
UROBILINOGEN UR STRIP-MCNC: NORMAL MG/DL
WBC # BLD AUTO: 17.6 10E3/UL (ref 4–11)
WBC URINE: 1 /HPF

## 2021-11-12 PROCEDURE — 85027 COMPLETE CBC AUTOMATED: CPT | Performed by: PHYSICIAN ASSISTANT

## 2021-11-12 PROCEDURE — 87040 BLOOD CULTURE FOR BACTERIA: CPT | Performed by: INTERNAL MEDICINE

## 2021-11-12 PROCEDURE — 99238 HOSP IP/OBS DSCHRG MGMT 30/<: CPT | Performed by: INTERNAL MEDICINE

## 2021-11-12 PROCEDURE — 250N000011 HC RX IP 250 OP 636: Performed by: PHYSICIAN ASSISTANT

## 2021-11-12 PROCEDURE — 250N000012 HC RX MED GY IP 250 OP 636 PS 637: Performed by: INTERNAL MEDICINE

## 2021-11-12 PROCEDURE — 258N000003 HC RX IP 258 OP 636: Performed by: PHYSICIAN ASSISTANT

## 2021-11-12 PROCEDURE — 80048 BASIC METABOLIC PNL TOTAL CA: CPT | Performed by: PHYSICIAN ASSISTANT

## 2021-11-12 PROCEDURE — 83605 ASSAY OF LACTIC ACID: CPT | Performed by: INTERNAL MEDICINE

## 2021-11-12 PROCEDURE — 250N000013 HC RX MED GY IP 250 OP 250 PS 637: Performed by: PHYSICIAN ASSISTANT

## 2021-11-12 PROCEDURE — 81003 URINALYSIS AUTO W/O SCOPE: CPT | Performed by: INTERNAL MEDICINE

## 2021-11-12 PROCEDURE — 83735 ASSAY OF MAGNESIUM: CPT | Performed by: PHYSICIAN ASSISTANT

## 2021-11-12 PROCEDURE — 97110 THERAPEUTIC EXERCISES: CPT | Mod: GO

## 2021-11-12 PROCEDURE — 87040 BLOOD CULTURE FOR BACTERIA: CPT | Performed by: PHYSICIAN ASSISTANT

## 2021-11-12 RX ORDER — LIDOCAINE 40 MG/G
CREAM TOPICAL
Status: DISCONTINUED | OUTPATIENT
Start: 2021-11-12 | End: 2021-11-12 | Stop reason: HOSPADM

## 2021-11-12 RX ORDER — PANTOPRAZOLE SODIUM 40 MG/1
40 TABLET, DELAYED RELEASE ORAL DAILY
Qty: 30 TABLET | Refills: 0 | Status: SHIPPED | OUTPATIENT
Start: 2021-11-12 | End: 2021-12-17

## 2021-11-12 RX ORDER — GABAPENTIN 300 MG/1
CAPSULE ORAL
Qty: 90 CAPSULE | Refills: 1 | Status: SHIPPED | OUTPATIENT
Start: 2021-11-12 | End: 2021-11-19

## 2021-11-12 RX ORDER — HEPARIN SODIUM,PORCINE 10 UNIT/ML
5-10 VIAL (ML) INTRAVENOUS
Status: DISCONTINUED | OUTPATIENT
Start: 2021-11-12 | End: 2021-11-12 | Stop reason: HOSPADM

## 2021-11-12 RX ADMIN — POTASSIUM CHLORIDE 20 MEQ: 750 TABLET, EXTENDED RELEASE ORAL at 08:58

## 2021-11-12 RX ADMIN — SODIUM CHLORIDE 500 ML: 9 INJECTION, SOLUTION INTRAVENOUS at 10:21

## 2021-11-12 RX ADMIN — GABAPENTIN 300 MG: 300 CAPSULE ORAL at 08:58

## 2021-11-12 RX ADMIN — ACYCLOVIR 800 MG: 800 TABLET ORAL at 08:58

## 2021-11-12 RX ADMIN — LEVOTHYROXINE SODIUM 50 MCG: 0.05 TABLET ORAL at 08:58

## 2021-11-12 RX ADMIN — ACETAMINOPHEN 650 MG: 325 TABLET, FILM COATED ORAL at 07:50

## 2021-11-12 RX ADMIN — FLUCONAZOLE 200 MG: 200 TABLET ORAL at 08:58

## 2021-11-12 RX ADMIN — LOSARTAN POTASSIUM 50 MG: 50 TABLET, FILM COATED ORAL at 08:58

## 2021-11-12 RX ADMIN — DEXAMETHASONE 8 MG: 4 TABLET ORAL at 09:04

## 2021-11-12 RX ADMIN — Medication 5 ML: at 10:21

## 2021-11-12 RX ADMIN — PANTOPRAZOLE SODIUM 40 MG: 40 TABLET, DELAYED RELEASE ORAL at 08:58

## 2021-11-12 RX ADMIN — LEVOFLOXACIN 250 MG: 250 TABLET, FILM COATED ORAL at 09:04

## 2021-11-12 ASSESSMENT — ACTIVITIES OF DAILY LIVING (ADL)
ADLS_ACUITY_SCORE: 3

## 2021-11-12 ASSESSMENT — MIFFLIN-ST. JEOR: SCORE: 1514.13

## 2021-11-12 NOTE — PROGRESS NOTES
Internal medicine cross cover note  -I was called by the nursing staff for lactic acid of 2.1 along with white blood cell count of 17.6 and heart rate of 109.  -I ordered 2 sets of blood cultures, chest x-ray, urinalysis, and a repeat lactic acid.  -No indication for IV fluids or initiation of antibiotics at this point.  -We will only initiate intravenous antibiotics and intravenous fluids if lactic acid worsened or if there was evidence of active infection.

## 2021-11-12 NOTE — PROGRESS NOTES
Care Management Discharge Note    Discharge Date: 11/12/2021       Discharge Disposition: Home    Discharge Services: None    Discharge DME: None    Discharge Transportation: family or friend will provide    Private pay costs discussed: Not applicable    PAS Confirmation Code:    Patient/family educated on Medicare website which has current facility and service quality ratings: no    Education Provided on the Discharge Plan:  yes  Persons Notified of Discharge Plans: patient  Patient/Family in Agreement with the Plan: yes    Handoff Referral Completed: No    Additional Information:  Per medical team the pt is stable for discharge today. The pt was up walking in the halls this AM and SW joined him before returning to his room. The pt shared that transplant went well yesterday and was very emotional he noted. The pt did note that he had a hard time with some of the pre-meds, but later in the day felt better. The pt discussed that his wife was able to be here for the transplant at his children were supportive from home. We reviewed the BMT SW discharge packet and the enclosed information. Pt expressed understanding and is aware to call if any questions come up. CSW provided empathic listening, validation of concerns, and encouragement. CSW encouraged Pt to contact CSW for support, questions and/or resources.     IRASEMA Farrar, [facility]  Pager: [phone]  Phone: [phone]

## 2021-11-12 NOTE — PLAN OF CARE
Occupational Therapy Discharge Summary    Reason for therapy discharge:    Discharged to home with outpatient therapy.    Progress towards therapy goal(s). See goals on Care Plan in [facility] electronic health record for goal details.  Goals partially met.  Barriers to achieving goals:   discharge from facility.    Therapy recommendation(s):    Continued therapy is recommended.  Rationale/Recommendations:  Pt with active lifestyle at baseline, limited in participation of IADLs and leisure by fatigue, pain related to MM fractures, and decreased endurance at this time. Pt would benefit from OP Cancer Rehab to progress endurance and provide additional education related to fatigue management and exercise.

## 2021-11-12 NOTE — PLAN OF CARE
"BP (!) 142/84 (BP Location: Left arm)   Pulse 107   Temp 97.5  F (36.4  C) (Oral)   Resp 14   Ht 1.645 m (5' 4.76\")   Wt 73.4 kg (161 lb 12.8 oz)   SpO2 100%   BMI 27.12 kg/m      VSS - patient slightly tachycardic per his baseline. Bone marrow transplant completed today without incident. C diff negative today. Patient continues to walk, eat, and complete oral cares. No PRN medications needed. NS flush @150mL/hr until 2300. Will discharge tomorrow. Continue with plan of care.     Problem: Adult Inpatient Plan of Care  Goal: Plan of Care Review  Outcome: No Change  Goal: Patient-Specific Goal (Individualized)  Outcome: No Change  Goal: Absence of Hospital-Acquired Illness or Injury  Outcome: No Change  Intervention: Identify and Manage Fall Risk  Recent Flowsheet Documentation  Taken 11/11/2021 0830 by Kendal Villanueva, RN  Safety Promotion/Fall Prevention:    assistive device/personal items within reach    chemotherapeutic precautions    clutter free environment maintained    nonskid shoes/slippers when out of bed    patient and family education  Intervention: Prevent and Manage VTE (Venous Thromboembolism) Risk  Recent Flowsheet Documentation  Taken 11/11/2021 0830 by Kendal Villanueva, RN  VTE Prevention/Management:    ambulation promoted    bleeding risk assessed    fluids promoted  Goal: Optimal Comfort and Wellbeing  Outcome: No Change  Goal: Readiness for Transition of Care  Outcome: No Change     Problem: Adjustment to Transplant (Stem Cell/Bone Marrow Transplant)  Goal: Optimal Coping with Transplant  Outcome: No Change     Problem: Bladder Irritation (Stem Cell/Bone Marrow Transplant)  Goal: Symptom-Free Urinary Elimination  Outcome: No Change     Problem: Diarrhea (Stem Cell/Bone Marrow Transplant)  Goal: Diarrhea Symptom Control  Outcome: No Change     Problem: Fatigue (Stem Cell/Bone Marrow Transplant)  Goal: Energy Level Supports Daily Activity  Outcome: No Change     Problem: Hematologic Alteration " (Stem Cell/Bone Marrow Transplant)  Goal: Blood Counts Within Acceptable Range  Outcome: No Change     Problem: Hypersensitivity Reaction (Stem Cell/Bone Marrow Transplant)  Goal: Absence of Hypersensitivity Reaction  Outcome: No Change     Problem: Infection Risk (Stem Cell/Bone Marrow Transplant)  Goal: Absence of Infection  Outcome: No Change     Problem: Mucositis (Stem Cell/Bone Marrow Transplant)  Goal: Mucous Membrane Health and Integrity  Outcome: No Change     Problem: Nausea and Vomiting (Stem Cell/Bone Marrow Transplant)  Goal: Nausea and Vomiting Symptom Relief  Outcome: No Change     Problem: Nutrition Intake Altered (Stem Cell/Bone Marrow Transplant)  Goal: Optimal Nutrition Intake  Outcome: No Change     Problem: Discharge Planning  Goal: Discharge Planning (Adult, OB, Behavioral, Peds)  Outcome: No Change

## 2021-11-12 NOTE — PROGRESS NOTES
Care Coordination - Discharge Planning    Line Company:  [facility] [phone] for line care supplies    **Patient requests Aquaguard shower covers for IV - DOROTA to please note this in line care orders (Summary of Care).   Referral made for line care:  Yes  IV medications needed at discharge:  None   Pharmacy concerns:  None.     PT/OT/therapies recommended:  None.  Referral made for PT/OT/therapies:  No  D/c location:  Home  Has placement need been communicated to Social Work?  N/A    NC Teaching time arranged with patient/caregiver:  Completed 11/10 with patient and wife Ginette.  Notify nurse to schedule line care class/ DM teaching, prior to d/c:  Patient and caregiver previously received teaching, and decline further need    Caregiver:    Wife - Ginette ([phone])    Malcolm Gr (Allison)  RN Care Coordinator - BMT  Phone: [phone]  Pager: [phone]

## 2021-11-12 NOTE — PROGRESS NOTES
"Discharge SBAR:    Situation:  June Ronquillo is a 51 year old being discharged to: Home.   Admission reason: Autologous.   Is this a readmission? No.   Discharge time: 12:30 pm.   Discharge barrier if discharged after 11AM: Yes, patient needed for fluid bolus piror to discharge for increased lactic acid. Also, his wife is able to pick him up at 12:30 pm.     Background:  Primary diagnosis: Multiple Myeloma.   /83   Pulse 103   Temp 97.9  F (36.6  C) (Oral)   Resp 18   Ht 1.645 m (5' 4.76\")   Wt 74.6 kg (164 lb 7.4 oz)   SpO2 100%   BMI 27.57 kg/m    Type of donor: Self.   Type of stem cells: Auto.  Relapsed? No.   Falls Precautions? No.   Isolation? No.   DNR? No.  DNI? No.   Confidential Patient? NO.  Positive blood cultures? Blood cultures were done today on both ports. No result yet.   Assessment:  Discharge teaching    Review discharge medications and schedule: Yes.     Set up pill box: Yes. But only 3 medications on hand but the rest of them are at home. Patient stated that he will follow up with the discharge instructions and set up the rest of the pills. Patient & caregiver did not have questions.     Discharge instructions reviewed: Yes.     Special considerations (think about previous reactions, issues with flushing CVC, premedication needs, etc): No.     Patient Concerns: No.     Recommendations:  Anticipated needs:    Daily infusions: No.     Daily transfusions: No.     G-CSF:  This medication will start on 11/16/21 at the [facility].     Other: None were noted.   Verbal report called to clinic: N/A.       "

## 2021-11-12 NOTE — PLAN OF CARE
"/83   Pulse 103   Temp 97.9  F (36.6  C) (Oral)   Resp 18   Ht 1.645 m (5' 4.76\")   Wt 74.6 kg (164 lb 7.4 oz)   SpO2 100%   BMI 27.57 kg/m  . Central line is C/D/I and HL. Patient is A & O x 4. Patient c/o lower back pain this morning and received tylenol x 1 with relief. No c/o nausea or emesis this morning. Patient is eating and drinking good. Discharge instructions were explained to the patient and verbalized understanding of his discharge instructions. Patient's wife is picking him up at 12:30 pm. Discharge medications, follow up appointment are explained to him and verbalized understanding. Continue with plan of care and notify MD for status changes.     Problem: Adult Inpatient Plan of Care  Goal: Plan of Care Review  Outcome: Adequate for Discharge  Flowsheets (Taken 11/12/2021 1225)  Plan of Care Reviewed With: patient     Problem: Adult Inpatient Plan of Care  Goal: Absence of Hospital-Acquired Illness or Injury  Intervention: Identify and Manage Fall Risk  Recent Flowsheet Documentation  Taken 11/12/2021 0855 by Rabia Forbes RN  Safety Promotion/Fall Prevention:   fall prevention program maintained   increased rounding and observation   increase visualization of patient   lighting adjusted   mobility aid in reach   nonskid shoes/slippers when out of bed   patient and family education     Problem: Adult Inpatient Plan of Care  Goal: Absence of Hospital-Acquired Illness or Injury  Intervention: Prevent and Manage VTE (Venous Thromboembolism) Risk  Recent Flowsheet Documentation  Taken 11/12/2021 0855 by Rabia Forbes RN  VTE Prevention/Management:   ambulation promoted   bleeding risk assessed     Problem: Mucositis (Stem Cell/Bone Marrow Transplant)  Goal: Mucous Membrane Health and Integrity  Intervention: Maintain Oral Mucous Membrane Integrity  Recent Flowsheet Documentation  Taken 11/12/2021 0855 by Rabia Forbes RN  Oral Care:   lip lubricant applied   mouth " wash rinse   oral rinse provided   teeth brushed   tongue brushed

## 2021-11-12 NOTE — PROGRESS NOTES
"BMT Daily Progress Note    ID: June Ronquillo is a 51 year old male, currently day+1 s/p his autologous hematopoietic cell transplant.    HPI: Transplant yesterday went well. He was tachycardic overnight and a lactic was drawn, result was 2.1. A rapid response was called, but clinically he is doing well. Cultures were ordered. This morning he is tired and didn't sleep well. He has a new frontal and occipital headache and just took a dose of tylenol. Denies n/v/d/c. Plans to eat oatmeal for breakfast. Ready for discharge to home today.    ROS: Negative except as stated above in HPI    Physical Exam  /70 (BP Location: Left arm)   Pulse 115   Temp 98  F (36.7  C)   Resp 18   Ht 1.645 m (5' 4.76\")   Wt 73.4 kg (161 lb 12.8 oz)   SpO2 99%   BMI 27.12 kg/m    General: NAD   Eyes: sclera anicteric   Nose/Mouth/Throat: OP clear, buccal mucosa moist, no ulcerations   Lungs: CTA bilaterally  Cardiovascular: RRR, no M/R/G   Abdominal/Rectal: +BS, soft, NT, ND, No HSM   Lymphatics: No edema  Skin: No rash. Small laceration on the top of his head, scab in place, no active bleeding or surrounding swelling  Neuro: A&O   Additional Findings: Weldon site NT, no drainage.    Labs  Lab Results   Component Value Date    WBC 17.6 (H) 11/12/2021    ANEU 16.9 (H) 11/12/2021    HGB 10.3 (L) 11/12/2021    HCT 30.6 (L) 11/12/2021     11/12/2021     11/12/2021    POTASSIUM 4.0 11/12/2021    CHLORIDE 108 11/12/2021    CO2 22 11/12/2021     (H) 11/12/2021    BUN 20 11/12/2021    CR 0.85 11/12/2021    MAG 2.5 (H) 11/12/2021    INR 0.95 11/10/2021    BILITOTAL 0.3 11/10/2021    AST 10 11/10/2021    ALT 19 11/10/2021    ALKPHOS 107 11/10/2021    PROTTOTAL 6.7 (L) 11/10/2021    ALBUMIN 3.6 11/10/2021     A/P:  June Ronquillo is a 51 year old male with high risk multiple myeloma, D+1 s/p auto BMT        Prep: melphalan on D-1  Transplant 11/11        1. BMT  - BMT doc/Coordinator: Miguelangel/Du Newsome; " Lorelei/Christine  - Cell dose 6.99 x 10^6; gave all cells due to high risk disease  - GCSF starts day +5, continue until ANC > 2500 for 2 consecutive days.      2. HEME/COAG  - Transfusion parameters: hgb <7g/dL and plts <10,000  - anticipate cytopenias due to chemotherapy  - leukocytosis from steroids     3. ID  - No active infections.  - Due to tachycardia and leukocytosis, a lactic was drawn and was 2.1. Patient was evaluated and clinically well. Blood cultures were drawn. A repeat lactic came back at 2.6. He received 500cc NS and will be discharged without a recheck due to clinical stability     Prophy:  Viral: ACV BID (CMV+)  Bacterial: levofloxacin 250mg daily  Fungal: fluc  PCP: Bactrim or other PCP prophy to start at day +28     4. GI/NUTRITION  - s/p antiemetics pre chemo. Send home with prn zofran  - Ulcer prophy: Protonix 40mg daily.      5. CV  - Hx of CAD, MI at age 28 with stent placed  - HTN: cont losartan. Hold hydrochlorothiazide through transplant  - Hyperlipidemia: hold statin through transplant     5. RENAL/  - Monitor Cr and electrolytes daily.   - Replace electrolytes per sliding scale     6. ENDOCRINE  - hx hypothyroidism  - new diagnosis of papillary thyroid carcinoma per bx result from 11/2, appears that the plan is for thyroidectomy after BMT     7. SUPPORTIVE CARE  - PT/OT to evaluate on admission and follow as indicated.      8. Neuro  - peripheral neuropathy: started gabapentin 300mg BID, increase pm dose to 600mg per patient request  - start B complex vitamin (contains vit C) about 2 weeks post transplant to avoid interaction with chemo    Dispo: discharge today to home with follow up in BMT clinic tomorrow  - follow up on Monday scheduled with pharmD appt for med review  - starts GCSF on Tues 11/16    Bibi Delgado PA-C  [phone]

## 2021-11-12 NOTE — PROVIDER NOTIFICATION
Bibi Delgado PA-C was notified that patient's lactic acid draw at 9 am was 2.6. Primary team indicated that pt will be discharge to home and 500 ml for fluid bolus. Blood culture on both ports and UA were done.

## 2021-11-12 NOTE — PLAN OF CARE
"/73   Pulse 109   Temp 97.7  F (36.5  C) (Oral)   Resp 18   Ht 1.645 m (5' 4.76\")   Wt 73.4 kg (161 lb 12.8 oz)   SpO2 100%   BMI 27.12 kg/m      Post transplant flush completed at 2300. Pt slightly tachy 105-109. Triggered sepsis d/t tachycardia and elevated WBC of 17.6 - lactic 2.1 - rapid response called. Other vitals stable. No complaints. No new orders - continue to monitor. No BM overnight - voiding adequately. No replacements needed. Plan to discharge today.     Problem: Adult Inpatient Plan of Care  Goal: Plan of Care Review  Outcome: No Change     "

## 2021-11-12 NOTE — PROGRESS NOTES
Care Coordination - Discharge Note    Line Company:  [facility] [phone] for line care supplies    **Patient requests Aquaguard shower covers for IV - DOROTA to please note this in line care orders (Summary of Care).   Referral made for line care:  Yes  IV medications needed at discharge:  None   Pharmacy concerns:  None.     PT/OT/therapies recommended:  None.  Referral made for PT/OT/therapies:  No  D/c location:  Home  Has placement need been communicated to Social Work?  N/A    NC Teaching time arranged with patient/caregiver:  Completed 11/10 with patient and wife Ginette.  Notify nurse to schedule line care class/ DM teaching, prior to d/c:  Patient and caregiver previously received teaching, and decline further need    Caregiver:    Wife - Ginette ([phone])    Malcolm Gr (Allison)  RN Care Coordinator - BMT  Phone: [phone]  Pager: [phone]

## 2021-11-12 NOTE — CODE/RAPID RESPONSE
Rapid Response Team Note    Assessment   In assessment a rapid response was called on Sail KINDRA Ronquillo due to SIRS/Sepsis trigger and lactic acidosis. This presentation is likely due to recent BMT and worsened by multiple myeloma.     Plan   -  No additional testing/treatment indicated. Patient will continue to encourage PO fluids.  -  The Hematology/Oncology primary team was able to be reached and they are in agreement with the above plan.  -  Disposition: The patient will remain on the current unit. We will continue to monitor this patient closely.  -  Reassessment and plan follow-up will be performed by the primary team      ZUNILDA KIRBY PA  [facility] RRT AMCOM Job Code Contact #[phone]    Hospital Course   Brief Summary of events leading to rapid response:   RRT called for LA 2.1. Pt is Day 1 post-BMT. Vitals are stable. He feels well, offers no concerns or complaints other than his nose is a little dry.    Admission Diagnosis:   Multiple myeloma not having achieved remission (H) [C90.00]     Physical Exam   Temp: 97.7  F (36.5  C) Temp  Min: 97.4  F (36.3  C)  Max: 98.2  F (36.8  C)  Resp: 18 Resp  Min: 14  Max: 18  SpO2: 100 % SpO2  Min: 98 %  Max: 100 %  Pulse: 109 Pulse  Min: 107  Max: 121    No data recorded  BP: 121/73 Systolic (24hrs), Av , Min:106 , Max:143   Diastolic (24hrs), Av, Min:64, Max:84     I/Os: I/O last 3 completed shifts:  In: 4350 [P.O.:2740; I.V.:1290; Blood:320]  Out: 3425 [Urine:3425]     Exam:   General: in no acute distress  Mental Status: AAOx4.  CV: Tachycardic  Resp: Nonlabored    Significant Results and Procedures   Lactic Acid:   Recent Labs   Lab Test 21  0453   LACT 2.1*     CBC:   Recent Labs   Lab Test 21  0404 21  0408 11/10/21  0842   WBC 17.6* 9.0 5.7   HGB 10.3* 11.4* 12.0*   HCT 30.6* 34.1* 36.5*    239 195        Sepsis Evaluation   The patient is not known to have an infection.  NO EVIDENCE OF SEPSIS at this time.  Vital  sign, physical exam, and lab findings are due to BMT.

## 2021-11-12 NOTE — PROVIDER NOTIFICATION
11/12/21 0500   Call Information   Date of Call 11/12/21   Time of Call 0509   Name of person requesting the team Mackenzie ROQUE   Title of person requesting team RN   RRT Arrival time 0513   Time RRT ended 0530   Reason for call   Type of RRT Adult   Primary reason for call Sepsis suspected   Sepsis Suspected Elevated Lactate level;Heart Rate > 100;WBC <4 or >12   Was patient transferred from the ED, ICU, or PACU within last 24 hours prior to RRT call? No   SBAR   Situation Lactic Acid 2.1   Background Hx of high risk multiple myeloma. Peripheral neuropathy as a result of chemotherapy. Biopsy confirmed papillary thyroid carcinoma. Autologous hematopoietic stem cell transplant 11/11.   Notable History/Conditions Cancer;Transplant   Assessment A&O x4, denies any issues. Interactive, pleasant and independent   Interventions Other (describe)  (encourage PO fluids)   Patient Outcome   Patient Outcome Stabilized on unit   RRT Team   Date Attending Physician notified 11/12/21   Physician(s) NIKUNJ Velez   Lead RN Gerardo ROQUE   RT Varsh   Post RRT Intervention Assessment   Post RRT Assessment Stable/Improved   Date Follow Up Done 11/12/21   Time Follow Up Done 0754   Comments C/O persistent light headache since 0500, denies other pain, nausea,and SOB, VSS on R/A

## 2021-11-13 ENCOUNTER — APPOINTMENT (OUTPATIENT)
Dept: LAB | Facility: CLINIC | Age: 51
End: 2021-11-13
Payer: COMMERCIAL

## 2021-11-13 ENCOUNTER — ONCOLOGY VISIT (OUTPATIENT)
Dept: TRANSPLANT | Facility: CLINIC | Age: 51
End: 2021-11-13
Payer: COMMERCIAL

## 2021-11-13 VITALS
BODY MASS INDEX: 27.27 KG/M2 | HEART RATE: 102 BPM | OXYGEN SATURATION: 97 % | SYSTOLIC BLOOD PRESSURE: 131 MMHG | DIASTOLIC BLOOD PRESSURE: 81 MMHG | RESPIRATION RATE: 18 BRPM | WEIGHT: 162.7 LBS | TEMPERATURE: 97.7 F

## 2021-11-13 DIAGNOSIS — Z71.89 OTHER SPECIFIED COUNSELING: ICD-10-CM

## 2021-11-13 DIAGNOSIS — Z79.899 ENCOUNTER FOR LONG-TERM (CURRENT) USE OF MEDICATIONS: ICD-10-CM

## 2021-11-13 DIAGNOSIS — Z52.011 AUTOLOGOUS DONOR, STEM CELLS: ICD-10-CM

## 2021-11-13 DIAGNOSIS — C90.00 MULTIPLE MYELOMA NOT HAVING ACHIEVED REMISSION (H): Primary | ICD-10-CM

## 2021-11-13 LAB
ALBUMIN SERPL-MCNC: 3.7 G/DL (ref 3.4–5)
ALP SERPL-CCNC: 73 U/L (ref 40–150)
ALT SERPL W P-5'-P-CCNC: 16 U/L (ref 0–70)
ANION GAP SERPL CALCULATED.3IONS-SCNC: 10 MMOL/L (ref 3–14)
AST SERPL W P-5'-P-CCNC: 15 U/L (ref 0–45)
BACTERIA SPEC CULT: NORMAL
BASOPHILS # BLD AUTO: 0 10E3/UL (ref 0–0.2)
BASOPHILS NFR BLD AUTO: 0 %
BILIRUB SERPL-MCNC: 0.3 MG/DL (ref 0.2–1.3)
BUN SERPL-MCNC: 18 MG/DL (ref 7–30)
CALCIUM SERPL-MCNC: 7.9 MG/DL (ref 8.5–10.1)
CHLORIDE BLD-SCNC: 104 MMOL/L (ref 94–109)
CO2 SERPL-SCNC: 22 MMOL/L (ref 20–32)
CREAT SERPL-MCNC: 0.84 MG/DL (ref 0.66–1.25)
EOSINOPHIL # BLD AUTO: 0 10E3/UL (ref 0–0.7)
EOSINOPHIL NFR BLD AUTO: 0 %
ERYTHROCYTE [DISTWIDTH] IN BLOOD BY AUTOMATED COUNT: 14.7 % (ref 10–15)
GFR SERPL CREATININE-BSD FRML MDRD: >90 ML/MIN/1.73M2
GLUCOSE BLD-MCNC: 152 MG/DL (ref 70–99)
HCT VFR BLD AUTO: 31.8 % (ref 40–53)
HGB BLD-MCNC: 10.4 G/DL (ref 13.3–17.7)
IMM GRANULOCYTES # BLD: 0 10E3/UL
IMM GRANULOCYTES NFR BLD: 1 %
LYMPHOCYTES # BLD AUTO: 0.1 10E3/UL (ref 0.8–5.3)
LYMPHOCYTES NFR BLD AUTO: 2 %
MCH RBC QN AUTO: 30.1 PG (ref 26.5–33)
MCHC RBC AUTO-ENTMCNC: 32.7 G/DL (ref 31.5–36.5)
MCV RBC AUTO: 92 FL (ref 78–100)
MONOCYTES # BLD AUTO: 0.2 10E3/UL (ref 0–1.3)
MONOCYTES NFR BLD AUTO: 4 %
NEUTROPHILS # BLD AUTO: 4 10E3/UL (ref 1.6–8.3)
NEUTROPHILS NFR BLD AUTO: 93 %
NRBC # BLD AUTO: 0 10E3/UL
NRBC BLD AUTO-RTO: 0 /100
PLATELET # BLD AUTO: 323 10E3/UL (ref 150–450)
POTASSIUM BLD-SCNC: 3.8 MMOL/L (ref 3.4–5.3)
PROT SERPL-MCNC: 6.3 G/DL (ref 6.8–8.8)
RBC # BLD AUTO: 3.45 10E6/UL (ref 4.4–5.9)
SODIUM SERPL-SCNC: 136 MMOL/L (ref 133–144)
WBC # BLD AUTO: 4.3 10E3/UL (ref 4–11)

## 2021-11-13 PROCEDURE — 85025 COMPLETE CBC W/AUTO DIFF WBC: CPT

## 2021-11-13 PROCEDURE — 250N000011 HC RX IP 250 OP 636: Performed by: INTERNAL MEDICINE

## 2021-11-13 PROCEDURE — 99214 OFFICE O/P EST MOD 30 MIN: CPT

## 2021-11-13 PROCEDURE — 80053 COMPREHEN METABOLIC PANEL: CPT

## 2021-11-13 PROCEDURE — 36592 COLLECT BLOOD FROM PICC: CPT

## 2021-11-13 PROCEDURE — G0463 HOSPITAL OUTPT CLINIC VISIT: HCPCS

## 2021-11-13 RX ORDER — HEPARIN SODIUM,PORCINE 10 UNIT/ML
5 VIAL (ML) INTRAVENOUS
Status: CANCELLED | OUTPATIENT
Start: 2021-11-16

## 2021-11-13 RX ORDER — HEPARIN SODIUM,PORCINE 10 UNIT/ML
5 VIAL (ML) INTRAVENOUS
Status: DISCONTINUED | OUTPATIENT
Start: 2021-11-13 | End: 2021-11-13 | Stop reason: HOSPADM

## 2021-11-13 RX ORDER — HEPARIN SODIUM (PORCINE) LOCK FLUSH IV SOLN 100 UNIT/ML 100 UNIT/ML
5 SOLUTION INTRAVENOUS
Status: CANCELLED | OUTPATIENT
Start: 2021-11-16

## 2021-11-13 RX ADMIN — SODIUM CHLORIDE, PRESERVATIVE FREE 5 ML: 5 INJECTION INTRAVENOUS at 09:18

## 2021-11-13 RX ADMIN — SODIUM CHLORIDE, PRESERVATIVE FREE 5 ML: 5 INJECTION INTRAVENOUS at 09:19

## 2021-11-13 ASSESSMENT — PAIN SCALES - GENERAL: PAINLEVEL: NO PAIN (0)

## 2021-11-13 NOTE — NURSING NOTE
"Oncology Rooming Note    November 13, 2021 9:15 AM   June Ronquillo is a 51 year old male who presents for:    Chief Complaint   Patient presents with     RECHECK     post bmt for MM here for labs and md visit     Initial Vitals: /81   Pulse 102   Temp 97.7  F (36.5  C)   Resp 18   Wt 73.8 kg (162 lb 11.2 oz)   SpO2 97%   BMI 27.27 kg/m   Estimated body mass index is 27.27 kg/m  as calculated from the following:    Height as of 11/10/21: 1.645 m (5' 4.76\").    Weight as of this encounter: 73.8 kg (162 lb 11.2 oz). Body surface area is 1.84 meters squared.  No Pain (0) Comment: Data Unavailable   No LMP for male patient.  Allergies reviewed: Yes  Medications reviewed: Yes    Medications: MEDICATION REFILLS NEEDED TODAY. Provider was notified.needs potassium refill ordered  Pharmacy name entered into [facility]:    [facility] [place], [place] - [address] [facility] [place], [place] - [address]    Clinical concerns: none       Alyce Gibbs RN              "

## 2021-11-13 NOTE — PROGRESS NOTES
BMT Daily Progress Note   November 13, 2021      ID: June Ronquillo is a 51 year old male, currently day+2 s/p his autologous hematopoietic cell transplant for myeloma     HPI: mild nausea; eating some; drinking well. no loose stools  No fcs or bleeding  No oral pain or sores;  LE sore due to neuropathy; just started gabapentin   ROS: Negative except as stated above in HPI     Physical Exam  /81   Pulse 102   Temp 97.7  F (36.5  C)   Resp 18   Wt 73.8 kg (162 lb 11.2 oz)   SpO2 97%   BMI 27.27 kg/m    Wt Readings from Last 4 Encounters:   11/13/21 73.8 kg (162 lb 11.2 oz)   11/12/21 74.6 kg (164 lb 7.4 oz)   11/05/21 76.4 kg (168 lb 6.4 oz)   11/04/21 74.8 kg (164 lb 14.5 oz)     General: NAD   Eyes: sclera anicteric   Nose/Mouth/Throat: OP clear,   no ulcerations   Lungs: CTA bilaterally  Cardiovascular: RRR, no M/R/G   Abdominal/Rectal: +BS, soft, NT, ND, No HSM No rebound  Lymphatics: No edema  Skin: No rash. Small laceration on the top of his head, scab in place, no active bleeding or surrounding swelling  Neuro: A&O   Additional Findings: Weldon site NT, no drainage.     Labs    A/P:  June Ronquillo is a 51 year old male with high risk multiple myeloma, D+2 s/p auto BMT     Prep: melphalan on D-1  Transplant 11/11        1. BMT  - BMT doc/Coordinator: Ricardo Newsome; Lorelei/Christine  - Cell dose 6.99 x 10^6; gave all cells due to high risk disease  - GCSF starts day +5, continue until ANC > 2500 for 2 consecutive days.      2. HEME/COAG  - Transfusion parameters: hgb <7g/dL and plts <10,000     3. ID  - No active infections.   Prophy:  Viral: ACV BID (CMV+)  Bacterial: levofloxacin 250mg daily  Fungal: fluc  PCP: Bactrim or other PCP prophy to start at day +28     4. GI/NUTRITION  - s/p antiemetics pre chemo. Send home with prn zofran  - Ulcer prophy: Protonix 40mg daily.      5. CV  - Hx of CAD, MI at age 28 with stent placed  - HTN: cont losartan. Hold hydrochlorothiazide  through transplant  - Hyperlipidemia: hold statin through transplant     5. RENAL/  - Monitor Cr and electrolytes daily.   - Replace electrolytes per sliding scale     6. ENDOCRINE  - hx hypothyroidism  - new diagnosis of papillary thyroid carcinoma per bx result from 11/2, appears that the plan is for thyroidectomy after BMT     7. SUPPORTIVE CARE  - PT/OT to evaluate on admission and follow as indicated.      8. Neuro  - peripheral neuropathy: started gabapentin 300mg BID, increase pm dose to 600mg per patient request  Still low dose; may need increase over next week or two  - start B complex vitamin (contains vit C) about 2 weeks post transplant to avoid interaction with chemo       RTC Tomorrow  - starts GCSF on Tues 11/16  Lazaro Metzger MD

## 2021-11-14 ENCOUNTER — APPOINTMENT (OUTPATIENT)
Dept: LAB | Facility: CLINIC | Age: 51
End: 2021-11-14
Attending: INTERNAL MEDICINE
Payer: COMMERCIAL

## 2021-11-14 ENCOUNTER — ONCOLOGY VISIT (OUTPATIENT)
Dept: TRANSPLANT | Facility: CLINIC | Age: 51
End: 2021-11-14
Attending: INTERNAL MEDICINE
Payer: COMMERCIAL

## 2021-11-14 VITALS
OXYGEN SATURATION: 99 % | DIASTOLIC BLOOD PRESSURE: 86 MMHG | TEMPERATURE: 98.1 F | WEIGHT: 162 LBS | SYSTOLIC BLOOD PRESSURE: 130 MMHG | RESPIRATION RATE: 16 BRPM | BODY MASS INDEX: 27.16 KG/M2 | HEART RATE: 100 BPM

## 2021-11-14 DIAGNOSIS — Z79.899 ENCOUNTER FOR LONG-TERM (CURRENT) USE OF MEDICATIONS: ICD-10-CM

## 2021-11-14 DIAGNOSIS — Z52.011 AUTOLOGOUS DONOR, STEM CELLS: ICD-10-CM

## 2021-11-14 DIAGNOSIS — C90.00 MULTIPLE MYELOMA NOT HAVING ACHIEVED REMISSION (H): ICD-10-CM

## 2021-11-14 LAB
ALBUMIN SERPL-MCNC: 3.6 G/DL (ref 3.4–5)
ALP SERPL-CCNC: 70 U/L (ref 40–150)
ALT SERPL W P-5'-P-CCNC: 18 U/L (ref 0–70)
ANION GAP SERPL CALCULATED.3IONS-SCNC: 9 MMOL/L (ref 3–14)
AST SERPL W P-5'-P-CCNC: 14 U/L (ref 0–45)
BASOPHILS # BLD MANUAL: 0 10E3/UL (ref 0–0.2)
BASOPHILS NFR BLD MANUAL: 0 %
BILIRUB SERPL-MCNC: 0.5 MG/DL (ref 0.2–1.3)
BUN SERPL-MCNC: 19 MG/DL (ref 7–30)
CALCIUM SERPL-MCNC: 8.1 MG/DL (ref 8.5–10.1)
CHLORIDE BLD-SCNC: 103 MMOL/L (ref 94–109)
CO2 SERPL-SCNC: 23 MMOL/L (ref 20–32)
CREAT SERPL-MCNC: 0.88 MG/DL (ref 0.66–1.25)
DACRYOCYTES BLD QL SMEAR: SLIGHT
EOSINOPHIL # BLD MANUAL: 0 10E3/UL (ref 0–0.7)
EOSINOPHIL NFR BLD MANUAL: 0 %
ERYTHROCYTE [DISTWIDTH] IN BLOOD BY AUTOMATED COUNT: 14.4 % (ref 10–15)
GFR SERPL CREATININE-BSD FRML MDRD: >90 ML/MIN/1.73M2
GLUCOSE BLD-MCNC: 116 MG/DL (ref 70–99)
HCT VFR BLD AUTO: 33 % (ref 40–53)
HGB BLD-MCNC: 11 G/DL (ref 13.3–17.7)
INR PPP: 1.06 (ref 0.85–1.15)
LYMPHOCYTES # BLD MANUAL: 0.1 10E3/UL (ref 0.8–5.3)
LYMPHOCYTES NFR BLD MANUAL: 4 %
MAGNESIUM SERPL-MCNC: 2.2 MG/DL (ref 1.6–2.3)
MCH RBC QN AUTO: 30.6 PG (ref 26.5–33)
MCHC RBC AUTO-ENTMCNC: 33.3 G/DL (ref 31.5–36.5)
MCV RBC AUTO: 92 FL (ref 78–100)
MONOCYTES # BLD MANUAL: 0 10E3/UL (ref 0–1.3)
MONOCYTES NFR BLD MANUAL: 0 %
NEUTROPHILS # BLD MANUAL: 3 10E3/UL (ref 1.6–8.3)
NEUTROPHILS NFR BLD MANUAL: 96 %
PLAT MORPH BLD: ABNORMAL
PLATELET # BLD AUTO: 299 10E3/UL (ref 150–450)
POTASSIUM BLD-SCNC: 3.6 MMOL/L (ref 3.4–5.3)
PROT SERPL-MCNC: 6.1 G/DL (ref 6.8–8.8)
RBC # BLD AUTO: 3.6 10E6/UL (ref 4.4–5.9)
RBC MORPH BLD: ABNORMAL
SODIUM SERPL-SCNC: 135 MMOL/L (ref 133–144)
TSH SERPL DL<=0.005 MIU/L-ACNC: 1.55 MU/L (ref 0.4–4)
WBC # BLD AUTO: 3.1 10E3/UL (ref 4–11)

## 2021-11-14 PROCEDURE — 99214 OFFICE O/P EST MOD 30 MIN: CPT

## 2021-11-14 PROCEDURE — 84443 ASSAY THYROID STIM HORMONE: CPT

## 2021-11-14 PROCEDURE — 36592 COLLECT BLOOD FROM PICC: CPT

## 2021-11-14 PROCEDURE — G0463 HOSPITAL OUTPT CLINIC VISIT: HCPCS

## 2021-11-14 PROCEDURE — 83735 ASSAY OF MAGNESIUM: CPT

## 2021-11-14 PROCEDURE — 250N000011 HC RX IP 250 OP 636: Performed by: INTERNAL MEDICINE

## 2021-11-14 PROCEDURE — 85027 COMPLETE CBC AUTOMATED: CPT

## 2021-11-14 PROCEDURE — 85610 PROTHROMBIN TIME: CPT

## 2021-11-14 PROCEDURE — 80053 COMPREHEN METABOLIC PANEL: CPT

## 2021-11-14 RX ORDER — HEPARIN SODIUM,PORCINE 10 UNIT/ML
5 VIAL (ML) INTRAVENOUS ONCE
Status: COMPLETED | OUTPATIENT
Start: 2021-11-14 | End: 2021-11-14

## 2021-11-14 RX ADMIN — Medication 5 ML: at 11:03

## 2021-11-14 ASSESSMENT — PAIN SCALES - GENERAL: PAINLEVEL: NO PAIN (1)

## 2021-11-14 NOTE — NURSING NOTE
Chief Complaint   Patient presents with     Blood Draw     Labs drawn via CVC by RN. VS taken.     Labs drawn from CVC by rn.  Good blood return noted in both lumens.  Both lumens flushed with NS and heparin.  Pt tolerated well.  VS taken.  Pt checked in for next appt.    Saúl Deluna RN

## 2021-11-14 NOTE — PROGRESS NOTES
BMT Daily Progress Note   November 13, 2021      ID: June Ronquillo is a 51 year old male, currently day+3 s/p his autologous hematopoietic cell transplant for myeloma     HPI: mild nausea; eating some; drinking well. no loose stools  No fcs or bleeding No resp or ENT sx.   No oral pain or sores;  LE sore due to neuropathy; just started gabapentin   ROS: Negative except as stated above in HPI     Physical Exam  /86   Pulse 100   Temp 98.1  F (36.7  C) (Oral)   Resp 16   Wt 73.5 kg (162 lb)   SpO2 99%   BMI 27.16 kg/m    Wt Readings from Last 4 Encounters:   11/14/21 73.5 kg (162 lb)   11/13/21 73.8 kg (162 lb 11.2 oz)   11/12/21 74.6 kg (164 lb 7.4 oz)   11/05/21 76.4 kg (168 lb 6.4 oz)     General: NAD   Eyes: sclera anicteric   Nose/Mouth/Throat: OP clear,   no ulcerations or petech  Lungs: CTA bilaterally  Cardiovascular: RRR, no M/R/G   Abdominal/Rectal: +BS, soft, NT, ND, No HSM No rebound  Lymphatics: No edema  Skin: No rash. Small laceration on the top of his head, scab in place, no active bleeding or surrounding swelling  Neuro: A&O   Additional Findings: Weldon site NT, no drainage.     Labs    A/P:  June Ronquillo is a 51 year old male with high risk multiple myeloma, D+3 s/p auto BMT     Prep: melphalan on D-1  Transplant 11/11        1. BMT  - BMT doc/Coordinator: Miguelangel/Du Newsome; Lorelei/Christine  - Cell dose 6.99 x 10^6; gave all cells due to high risk disease  - GCSF starts day +5, continue until ANC > 2500 for 2 consecutive days.      2. HEME/COAG  - Transfusion parameters: hgb <7g/dL and plts <10,000   no transfusions needed today    3. ID  - No active infections.   Prophy:  Viral: ACV BID (CMV+)  Bacterial: levofloxacin 250mg daily  Fungal: fluc  PCP: Bactrim or other PCP prophy to start at day +28     4. GI/NUTRITION  - s/p antiemetics pre chemo. Send home with prn zofran compazine and ativan;  compazine seems to help.    - Ulcer prophy: Protonix 40mg daily.      5.  CV  - Hx of CAD, MI at age 28 with stent placed  - HTN: cont losartan. Hold hydrochlorothiazide through transplant  - Hyperlipidemia: hold statin through transplant     6 RENAL/  FEN  - Monitor Cr and electrolytes daily.   - Replace electrolytes per sliding scale     7. ENDOCRINE  - hx hypothyroidism  - new diagnosis of papillary thyroid carcinoma per bx result from 11/2, appears that the plan is for thyroidectomy after BMT     8 SUPPORTIVE CARE  - PT/OT to evaluate on admission and follow as indicated.      9. Neuro  - peripheral neuropathy: started gabapentin 300mg BID, increase pm dose to 600mg per patient request  Still low dose; may need increase over next week or two  - start B complex vitamin (contains vit C) about 2 weeks post transplant to avoid interaction with chemo       RTC Monday   visit and labs  - starts GCSF on Tues 11/16  Lazaro Metzger MD     Results for CHENG CEDILLOGIANFRANCOSIMÓN ARGUELLES (MRN [id number]) as of 11/14/2021 11:44   Ref. Range 11/13/2021 09:10 11/14/2021 11:03   Sodium Latest Ref Range: 133 - 144 mmol/L 136 135   Potassium Latest Ref Range: 3.4 - 5.3 mmol/L 3.8 3.6   Chloride Latest Ref Range: 94 - 109 mmol/L 104 103   Carbon Dioxide Latest Ref Range: 20 - 32 mmol/L 22 23   Urea Nitrogen Latest Ref Range: 7 - 30 mg/dL 18 19   Creatinine Latest Ref Range: 0.66 - 1.25 mg/dL 0.84 0.88   GFR Estimate Latest Ref Range: >60 mL/min/1.73m2 >90 >90   Calcium Latest Ref Range: 8.5 - 10.1 mg/dL 7.9 (L) 8.1 (L)   Anion Gap Latest Ref Range: 3 - 14 mmol/L 10 9   Magnesium Latest Ref Range: 1.6 - 2.3 mg/dL  2.2   Albumin Latest Ref Range: 3.4 - 5.0 g/dL 3.7 3.6   Protein Total Latest Ref Range: 6.8 - 8.8 g/dL 6.3 (L) 6.1 (L)   Bilirubin Total Latest Ref Range: 0.2 - 1.3 mg/dL 0.3 0.5   Alkaline Phosphatase Latest Ref Range: 40 - 150 U/L 73 70   ALT Latest Ref Range: 0 - 70 U/L 16 18   AST Latest Ref Range: 0 - 45 U/L 15 14   Glucose Latest Ref Range: 70 - 99 mg/dL 152 (H) 116 (H)   WBC Latest Ref  Range: 4.0 - 11.0 10e3/uL 4.3 3.1 (L)   Hemoglobin Latest Ref Range: 13.3 - 17.7 g/dL 10.4 (L) 11.0 (L)   Hematocrit Latest Ref Range: 40.0 - 53.0 % 31.8 (L) 33.0 (L)   Platelet Count Latest Ref Range: 150 - 450 10e3/uL 323 299   RBC Count Latest Ref Range: 4.40 - 5.90 10e6/uL 3.45 (L) 3.60 (L)   MCV Latest Ref Range: 78 - 100 fL 92 92   MCH Latest Ref Range: 26.5 - 33.0 pg 30.1 30.6   MCHC Latest Ref Range: 31.5 - 36.5 g/dL 32.7 33.3   RDW Latest Ref Range: 10.0 - 15.0 % 14.7 14.4   % Neutrophils Latest Units: % 93 96   % Lymphocytes Latest Units: % 2 4   % Monocytes Latest Units: % 4 0   % Eosinophils Latest Units: % 0 0   % Basophils Latest Units: % 0 0   Absolute Basophils Latest Ref Range: 0.0 - 0.2 10e3/uL 0.0    Absolute Basophils Latest Ref Range: 0.0 - 0.2 10e3/uL  0.0   Absolute Neutrophil Latest Ref Range: 1.6 - 8.3 10e3/uL  3.0   Absolute Lymphocytes Latest Ref Range: 0.8 - 5.3 10e3/uL  0.1 (L)   Absolute Monocytes Latest Ref Range: 0.0 - 1.3 10e3/uL  0.0   Absolute Eosinophils Latest Ref Range: 0.0 - 0.7 10e3/uL  0.0   Absolute Eosinophils Latest Ref Range: 0.0 - 0.7 10e3/uL 0.0    Absolute Immature Granulocytes Latest Ref Range: <=0.0 10e3/uL 0.0    Absolute Lymphocytes Latest Ref Range: 0.8 - 5.3 10e3/uL 0.1 (L)    Absolute Monocytes Latest Ref Range: 0.0 - 1.3 10e3/uL 0.2    % Immature Granulocytes Latest Units: % 1    Absolute Neutrophils Latest Ref Range: 1.6 - 8.3 10e3/uL 4.0    Absolute NRBCs Latest Units: 10e3/uL 0.0    NRBCs per 100 WBC Latest Ref Range: <1 /100 0    RBC Morphology Unknown  Confirmed RBC Indices   Platelet Morphology Latest Ref Range: Automated Count Confirmed. Platelet morphology is normal.   Automated Count Confirmed. Platelet morphology is normal.   INR Latest Ref Range: 0.85 - 1.15   1.06

## 2021-11-14 NOTE — PROGRESS NOTES
BMT Daily Progress Note   November 13, 2021      ID: June Ronquillo is a 51 year old male, currently day+2 s/p his autologous hematopoietic cell transplant for myeloma     HPI: mild nausea; eating some; drinking well. no loose stools  No fcs or bleeding  No oral pain or sores;  LE sore due to neuropathy; just started gabapentin   ROS: Negative except as stated above in HPI     Physical Exam  /86   Pulse 100   Temp 98.1  F (36.7  C) (Oral)   Resp 16   Wt 73.5 kg (162 lb)   SpO2 99%   BMI 27.16 kg/m    Wt Readings from Last 4 Encounters:   11/14/21 73.5 kg (162 lb)   11/13/21 73.8 kg (162 lb 11.2 oz)   11/12/21 74.6 kg (164 lb 7.4 oz)   11/05/21 76.4 kg (168 lb 6.4 oz)     General: NAD   Eyes: sclera anicteric   Nose/Mouth/Throat: OP clear,   no ulcerations   Lungs: CTA bilaterally  Cardiovascular: RRR, no M/R/G   Abdominal/Rectal: +BS, soft, NT, ND, No HSM No rebound  Lymphatics: No edema  Skin: No rash. Small laceration on the top of his head, scab in place, no active bleeding or surrounding swelling  Neuro: A&O   Additional Findings: Weldon site NT, no drainage.     Labs    A/P:  June Ronquillo is a 51 year old male with high risk multiple myeloma, D+2 s/p auto BMT     Prep: melphalan on D-1  Transplant 11/11        1. BMT  - BMT doc/Coordinator: Miguelangel/Du Newsome; Lorelei/Christine  - Cell dose 6.99 x 10^6; gave all cells due to high risk disease  - GCSF starts day +5, continue until ANC > 2500 for 2 consecutive days.      2. HEME/COAG  - Transfusion parameters: hgb <7g/dL and plts <10,000     3. ID  - No active infections.   Prophy:  Viral: ACV BID (CMV+)  Bacterial: levofloxacin 250mg daily  Fungal: fluc  PCP: Bactrim or other PCP prophy to start at day +28     4. GI/NUTRITION  - s/p antiemetics pre chemo. Send home with prn zofran  - Ulcer prophy: Protonix 40mg daily.      5. CV  - Hx of CAD, MI at age 28 with stent placed  - HTN: cont losartan. Hold hydrochlorothiazide through  transplant  - Hyperlipidemia: hold statin through transplant     5. RENAL/  - Monitor Cr and electrolytes daily.   - Replace electrolytes per sliding scale     6. ENDOCRINE  - hx hypothyroidism  - new diagnosis of papillary thyroid carcinoma per bx result from 11/2, appears that the plan is for thyroidectomy after BMT     7. SUPPORTIVE CARE  - PT/OT to evaluate on admission and follow as indicated.      8. Neuro  - peripheral neuropathy: started gabapentin 300mg BID, increase pm dose to 600mg per patient request  Still low dose; may need increase over next week or two  - start B complex vitamin (contains vit C) about 2 weeks post transplant to avoid interaction with chemo       RTC Tomorrow  - starts GCSF on Tues 11/16  Lazaro Metzger MD

## 2021-11-14 NOTE — NURSING NOTE
"Oncology Rooming Note    November 14, 2021 11:08 AM   June Ronquillo is a 51 year old male who presents for:    Chief Complaint   Patient presents with     Blood Draw     Labs drawn via CVC by RN. VS taken.     RECHECK     post bmt for MM here for labs and md visit     Initial Vitals: /86   Pulse 100   Temp 98.1  F (36.7  C) (Oral)   Resp 16   Wt 73.5 kg (162 lb)   SpO2 99%   BMI 27.16 kg/m   Estimated body mass index is 27.16 kg/m  as calculated from the following:    Height as of 11/10/21: 1.645 m (5' 4.76\").    Weight as of this encounter: 73.5 kg (162 lb). Body surface area is 1.83 meters squared.  No Pain (1) Comment: Data Unavailable   No LMP for male patient.  Allergies reviewed: Yes  Medications reviewed: Yes    Medications: Medication refills not needed today.  Pharmacy name entered into [employer]:    [facility] [place], [place] - [address] [facility] [place], [place] - [address]    Clinical concerns: pt reports some nausea which resolved with anti nausea medication     Alyce Gibbs RN              "

## 2021-11-14 NOTE — LETTER
Date:December 18, 2021      Provider requested that no letter be sent. Do not send.       [facility]

## 2021-11-14 NOTE — LETTER
11/14/2021         RE: June Ronquillo  [address]  [facility] [address]        Dear Colleague,    Thank you for referring your patient, June Ronquillo, to the [facility] [place]. Please see a copy of my visit note below.                BMT Daily Progress Note   November 13, 2021      ID: June Ronquillo is a 51 year old male, currently day+3 s/p his autologous hematopoietic cell transplant for myeloma     HPI: mild nausea; eating some; drinking well. no loose stools  No fcs or bleeding No resp or ENT sx.   No oral pain or sores;  LE sore due to neuropathy; just started gabapentin   ROS: Negative except as stated above in HPI     Physical Exam  /86   Pulse 100   Temp 98.1  F (36.7  C) (Oral)   Resp 16   Wt 73.5 kg (162 lb)   SpO2 99%   BMI 27.16 kg/m    Wt Readings from Last 4 Encounters:   11/14/21 73.5 kg (162 lb)   11/13/21 73.8 kg (162 lb 11.2 oz)   11/12/21 74.6 kg (164 lb 7.4 oz)   11/05/21 76.4 kg (168 lb 6.4 oz)     General: NAD   Eyes: sclera anicteric   Nose/Mouth/Throat: OP clear,   no ulcerations or petech  Lungs: CTA bilaterally  Cardiovascular: RRR, no M/R/G   Abdominal/Rectal: +BS, soft, NT, ND, No HSM No rebound  Lymphatics: No edema  Skin: No rash. Small laceration on the top of his head, scab in place, no active bleeding or surrounding swelling  Neuro: A&O   Additional Findings: Weldon site NT, no drainage.     Labs    A/P:  June Ronquillo is a 51 year old male with high risk multiple myeloma, D+3 s/p auto BMT     Prep: melphalan on D-1  Transplant 11/11        1. BMT  - BMT doc/Coordinator: Miguelangel/Du Newsome; Lorelei/Christine  - Cell dose 6.99 x 10^6; gave all cells due to high risk disease  - GCSF starts day +5, continue until ANC > 2500 for 2 consecutive days.      2. HEME/COAG  - Transfusion parameters: hgb <7g/dL and plts <10,000   no transfusions needed today    3. ID  - No active  infections.   Prophy:  Viral: ACV BID (CMV+)  Bacterial: levofloxacin 250mg daily  Fungal: fluc  PCP: Bactrim or other PCP prophy to start at day +28     4. GI/NUTRITION  - s/p antiemetics pre chemo. Send home with prn zofran compazine and ativan;  compazine seems to help.    - Ulcer prophy: Protonix 40mg daily.      5. CV  - Hx of CAD, MI at age 28 with stent placed  - HTN: cont losartan. Hold hydrochlorothiazide through transplant  - Hyperlipidemia: hold statin through transplant     6 RENAL/  FEN  - Monitor Cr and electrolytes daily.   - Replace electrolytes per sliding scale     7. ENDOCRINE  - hx hypothyroidism  - new diagnosis of papillary thyroid carcinoma per bx result from 11/2, appears that the plan is for thyroidectomy after BMT     8 SUPPORTIVE CARE  - PT/OT to evaluate on admission and follow as indicated.      9. Neuro  - peripheral neuropathy: started gabapentin 300mg BID, increase pm dose to 600mg per patient request  Still low dose; may need increase over next week or two  - start B complex vitamin (contains vit C) about 2 weeks post transplant to avoid interaction with chemo       RTC Monday   visit and labs  - starts GCSF on Tues 11/16  Lazaro Metzger MD     Results for LÁZARO CEDILLO (MRN [id number]) as of 11/14/2021 11:44   Ref. Range 11/13/2021 09:10 11/14/2021 11:03   Sodium Latest Ref Range: 133 - 144 mmol/L 136 135   Potassium Latest Ref Range: 3.4 - 5.3 mmol/L 3.8 3.6   Chloride Latest Ref Range: 94 - 109 mmol/L 104 103   Carbon Dioxide Latest Ref Range: 20 - 32 mmol/L 22 23   Urea Nitrogen Latest Ref Range: 7 - 30 mg/dL 18 19   Creatinine Latest Ref Range: 0.66 - 1.25 mg/dL 0.84 0.88   GFR Estimate Latest Ref Range: >60 mL/min/1.73m2 >90 >90   Calcium Latest Ref Range: 8.5 - 10.1 mg/dL 7.9 (L) 8.1 (L)   Anion Gap Latest Ref Range: 3 - 14 mmol/L 10 9   Magnesium Latest Ref Range: 1.6 - 2.3 mg/dL  2.2   Albumin Latest Ref Range: 3.4 - 5.0 g/dL 3.7 3.6   Protein Total Latest  Ref Range: 6.8 - 8.8 g/dL 6.3 (L) 6.1 (L)   Bilirubin Total Latest Ref Range: 0.2 - 1.3 mg/dL 0.3 0.5   Alkaline Phosphatase Latest Ref Range: 40 - 150 U/L 73 70   ALT Latest Ref Range: 0 - 70 U/L 16 18   AST Latest Ref Range: 0 - 45 U/L 15 14   Glucose Latest Ref Range: 70 - 99 mg/dL 152 (H) 116 (H)   WBC Latest Ref Range: 4.0 - 11.0 10e3/uL 4.3 3.1 (L)   Hemoglobin Latest Ref Range: 13.3 - 17.7 g/dL 10.4 (L) 11.0 (L)   Hematocrit Latest Ref Range: 40.0 - 53.0 % 31.8 (L) 33.0 (L)   Platelet Count Latest Ref Range: 150 - 450 10e3/uL 323 299   RBC Count Latest Ref Range: 4.40 - 5.90 10e6/uL 3.45 (L) 3.60 (L)   MCV Latest Ref Range: 78 - 100 fL 92 92   MCH Latest Ref Range: 26.5 - 33.0 pg 30.1 30.6   MCHC Latest Ref Range: 31.5 - 36.5 g/dL 32.7 33.3   RDW Latest Ref Range: 10.0 - 15.0 % 14.7 14.4   % Neutrophils Latest Units: % 93 96   % Lymphocytes Latest Units: % 2 4   % Monocytes Latest Units: % 4 0   % Eosinophils Latest Units: % 0 0   % Basophils Latest Units: % 0 0   Absolute Basophils Latest Ref Range: 0.0 - 0.2 10e3/uL 0.0    Absolute Basophils Latest Ref Range: 0.0 - 0.2 10e3/uL  0.0   Absolute Neutrophil Latest Ref Range: 1.6 - 8.3 10e3/uL  3.0   Absolute Lymphocytes Latest Ref Range: 0.8 - 5.3 10e3/uL  0.1 (L)   Absolute Monocytes Latest Ref Range: 0.0 - 1.3 10e3/uL  0.0   Absolute Eosinophils Latest Ref Range: 0.0 - 0.7 10e3/uL  0.0   Absolute Eosinophils Latest Ref Range: 0.0 - 0.7 10e3/uL 0.0    Absolute Immature Granulocytes Latest Ref Range: <=0.0 10e3/uL 0.0    Absolute Lymphocytes Latest Ref Range: 0.8 - 5.3 10e3/uL 0.1 (L)    Absolute Monocytes Latest Ref Range: 0.0 - 1.3 10e3/uL 0.2    % Immature Granulocytes Latest Units: % 1    Absolute Neutrophils Latest Ref Range: 1.6 - 8.3 10e3/uL 4.0    Absolute NRBCs Latest Units: 10e3/uL 0.0    NRBCs per 100 WBC Latest Ref Range: <1 /100 0    RBC Morphology Unknown  Confirmed RBC Indices   Platelet Morphology Latest Ref Range: Automated Count Confirmed.  Platelet morphology is normal.   Automated Count Confirmed. Platelet morphology is normal.   INR Latest Ref Range: 0.85 - 1.15   1.06                              BMT Daily Progress Note   November 13, 2021      ID: June Ronquillo is a 51 year old male, currently day+2 s/p his autologous hematopoietic cell transplant for myeloma     HPI: mild nausea; eating some; drinking well. no loose stools  No fcs or bleeding  No oral pain or sores;  LE sore due to neuropathy; just started gabapentin   ROS: Negative except as stated above in HPI     Physical Exam  /86   Pulse 100   Temp 98.1  F (36.7  C) (Oral)   Resp 16   Wt 73.5 kg (162 lb)   SpO2 99%   BMI 27.16 kg/m    Wt Readings from Last 4 Encounters:   11/14/21 73.5 kg (162 lb)   11/13/21 73.8 kg (162 lb 11.2 oz)   11/12/21 74.6 kg (164 lb 7.4 oz)   11/05/21 76.4 kg (168 lb 6.4 oz)     General: NAD   Eyes: sclera anicteric   Nose/Mouth/Throat: OP clear,   no ulcerations   Lungs: CTA bilaterally  Cardiovascular: RRR, no M/R/G   Abdominal/Rectal: +BS, soft, NT, ND, No HSM No rebound  Lymphatics: No edema  Skin: No rash. Small laceration on the top of his head, scab in place, no active bleeding or surrounding swelling  Neuro: A&O   Additional Findings: Weldon site NT, no drainage.     Labs    A/P:  June Ronquillo is a 51 year old male with high risk multiple myeloma, D+2 s/p auto BMT     Prep: melphalan on D-1  Transplant 11/11        2. BMT  - BMT doc/Coordinator: Miguelangel/Du Newsome; Lorelei/Christine  - Cell dose 6.99 x 10^6; gave all cells due to high risk disease  - GCSF starts day +5, continue until ANC > 2500 for 2 consecutive days.      3. HEME/COAG  - Transfusion parameters: hgb <7g/dL and plts <10,000     4. ID  - No active infections.   Prophy:  Viral: ACV BID (CMV+)  Bacterial: levofloxacin 250mg daily  Fungal: fluc  PCP: Bactrim or other PCP prophy to start at day +28     5. GI/NUTRITION  - s/p antiemetics pre chemo. Send home with prn zofran  -  Ulcer prophy: Protonix 40mg daily.      5. CV  - Hx of CAD, MI at age 28 with stent placed  - HTN: cont losartan. Hold hydrochlorothiazide through transplant  - Hyperlipidemia: hold statin through transplant     5. RENAL/  - Monitor Cr and electrolytes daily.   - Replace electrolytes per sliding scale     6. ENDOCRINE  - hx hypothyroidism  - new diagnosis of papillary thyroid carcinoma per bx result from 11/2, appears that the plan is for thyroidectomy after BMT     7. SUPPORTIVE CARE  - PT/OT to evaluate on admission and follow as indicated.      8. Neuro  - peripheral neuropathy: started gabapentin 300mg BID, increase pm dose to 600mg per patient request  Still low dose; may need increase over next week or two  - start B complex vitamin (contains vit C) about 2 weeks post transplant to avoid interaction with chemo       RTC Tomorrow  - starts GCSF on Tues 11/16  Lazaro Metzger MD                      Again, thank you for allowing me to participate in the care of your patient.        Sincerely,        BMT DOM

## 2021-11-15 ENCOUNTER — HOME INFUSION (PRE-WILLOW HOME INFUSION) (OUTPATIENT)
Dept: PHARMACY | Facility: CLINIC | Age: 51
End: 2021-11-15

## 2021-11-15 ENCOUNTER — MYC MEDICAL ADVICE (OUTPATIENT)
Dept: ONCOLOGY | Facility: CLINIC | Age: 51
End: 2021-11-15

## 2021-11-15 ENCOUNTER — PATIENT OUTREACH (OUTPATIENT)
Dept: CARE COORDINATION | Facility: CLINIC | Age: 51
End: 2021-11-15

## 2021-11-15 ENCOUNTER — APPOINTMENT (OUTPATIENT)
Dept: LAB | Facility: CLINIC | Age: 51
End: 2021-11-15
Payer: COMMERCIAL

## 2021-11-15 ENCOUNTER — ONCOLOGY VISIT (OUTPATIENT)
Dept: TRANSPLANT | Facility: CLINIC | Age: 51
End: 2021-11-15
Payer: COMMERCIAL

## 2021-11-15 ENCOUNTER — ALLIED HEALTH/NURSE VISIT (OUTPATIENT)
Dept: TRANSPLANT | Facility: CLINIC | Age: 51
End: 2021-11-15
Payer: COMMERCIAL

## 2021-11-15 VITALS
DIASTOLIC BLOOD PRESSURE: 76 MMHG | RESPIRATION RATE: 16 BRPM | BODY MASS INDEX: 26.87 KG/M2 | SYSTOLIC BLOOD PRESSURE: 121 MMHG | HEART RATE: 106 BPM | OXYGEN SATURATION: 99 % | WEIGHT: 160.3 LBS | TEMPERATURE: 98.4 F

## 2021-11-15 DIAGNOSIS — Z52.011 AUTOLOGOUS DONOR, STEM CELLS: ICD-10-CM

## 2021-11-15 DIAGNOSIS — C90.00 MULTIPLE MYELOMA NOT HAVING ACHIEVED REMISSION (H): Primary | ICD-10-CM

## 2021-11-15 DIAGNOSIS — C90.00 MULTIPLE MYELOMA NOT HAVING ACHIEVED REMISSION (H): ICD-10-CM

## 2021-11-15 LAB
ALBUMIN SERPL-MCNC: 3.7 G/DL (ref 3.4–5)
ALP SERPL-CCNC: 74 U/L (ref 40–150)
ALT SERPL W P-5'-P-CCNC: 18 U/L (ref 0–70)
ANION GAP SERPL CALCULATED.3IONS-SCNC: 10 MMOL/L (ref 3–14)
AST SERPL W P-5'-P-CCNC: 11 U/L (ref 0–45)
BASOPHILS # BLD AUTO: 0 10E3/UL (ref 0–0.2)
BASOPHILS NFR BLD AUTO: 0 %
BILIRUB SERPL-MCNC: 0.4 MG/DL (ref 0.2–1.3)
BUN SERPL-MCNC: 20 MG/DL (ref 7–30)
CALCIUM SERPL-MCNC: 8.4 MG/DL (ref 8.5–10.1)
CHLORIDE BLD-SCNC: 100 MMOL/L (ref 94–109)
CO2 SERPL-SCNC: 23 MMOL/L (ref 20–32)
CREAT SERPL-MCNC: 0.87 MG/DL (ref 0.66–1.25)
EOSINOPHIL # BLD AUTO: 0 10E3/UL (ref 0–0.7)
EOSINOPHIL NFR BLD AUTO: 0 %
ERYTHROCYTE [DISTWIDTH] IN BLOOD BY AUTOMATED COUNT: 14.3 % (ref 10–15)
GFR SERPL CREATININE-BSD FRML MDRD: >90 ML/MIN/1.73M2
GLUCOSE BLD-MCNC: 142 MG/DL (ref 70–99)
HCT VFR BLD AUTO: 34.9 % (ref 40–53)
HGB BLD-MCNC: 11.7 G/DL (ref 13.3–17.7)
IMM GRANULOCYTES # BLD: 0 10E3/UL
IMM GRANULOCYTES NFR BLD: 0 %
LYMPHOCYTES # BLD AUTO: 0.1 10E3/UL (ref 0.8–5.3)
LYMPHOCYTES NFR BLD AUTO: 5 %
MAGNESIUM SERPL-MCNC: 2.3 MG/DL (ref 1.6–2.3)
MCH RBC QN AUTO: 30.6 PG (ref 26.5–33)
MCHC RBC AUTO-ENTMCNC: 33.5 G/DL (ref 31.5–36.5)
MCV RBC AUTO: 91 FL (ref 78–100)
MONOCYTES # BLD AUTO: 0 10E3/UL (ref 0–1.3)
MONOCYTES NFR BLD AUTO: 1 %
NEUTROPHILS # BLD AUTO: 2.2 10E3/UL (ref 1.6–8.3)
NEUTROPHILS NFR BLD AUTO: 94 %
NRBC # BLD AUTO: 0 10E3/UL
NRBC BLD AUTO-RTO: 0 /100
PLATELET # BLD AUTO: 259 10E3/UL (ref 150–450)
POTASSIUM BLD-SCNC: 3.9 MMOL/L (ref 3.4–5.3)
PROT SERPL-MCNC: 6.5 G/DL (ref 6.8–8.8)
RBC # BLD AUTO: 3.82 10E6/UL (ref 4.4–5.9)
SODIUM SERPL-SCNC: 133 MMOL/L (ref 133–144)
WBC # BLD AUTO: 2.4 10E3/UL (ref 4–11)

## 2021-11-15 PROCEDURE — 250N000011 HC RX IP 250 OP 636: Performed by: INTERNAL MEDICINE

## 2021-11-15 PROCEDURE — 83735 ASSAY OF MAGNESIUM: CPT

## 2021-11-15 PROCEDURE — 85025 COMPLETE CBC W/AUTO DIFF WBC: CPT

## 2021-11-15 PROCEDURE — 80053 COMPREHEN METABOLIC PANEL: CPT

## 2021-11-15 PROCEDURE — 36592 COLLECT BLOOD FROM PICC: CPT

## 2021-11-15 PROCEDURE — 99214 OFFICE O/P EST MOD 30 MIN: CPT

## 2021-11-15 RX ORDER — HEPARIN SODIUM,PORCINE 10 UNIT/ML
5 VIAL (ML) INTRAVENOUS ONCE
Status: COMPLETED | OUTPATIENT
Start: 2021-11-15 | End: 2021-11-15

## 2021-11-15 RX ADMIN — SODIUM CHLORIDE, PRESERVATIVE FREE 5 ML: 5 INJECTION INTRAVENOUS at 09:52

## 2021-11-15 RX ADMIN — SODIUM CHLORIDE, PRESERVATIVE FREE 5 ML: 5 INJECTION INTRAVENOUS at 09:51

## 2021-11-15 ASSESSMENT — PAIN SCALES - GENERAL: PAINLEVEL: MILD PAIN (2)

## 2021-11-15 NOTE — PROGRESS NOTES
This is a recent snapshot of the patient's [facility] medical record.  For current drug dose and complete information and questions, call [phone] or In Basket pool, [facility] ([id number])  CSN Number:  [id number]

## 2021-11-15 NOTE — NURSING NOTE
"Oncology Rooming Note    November 15, 2021 10:02 AM   June Ronquillo is a 51 year old male who presents for:    Chief Complaint   Patient presents with     Blood Draw     Labs drawn via cvc by RN in lab. VS taken.      Oncology Clinic Visit     MULTIPLE MYELOMA     Initial Vitals: /76 (BP Location: Right arm, Patient Position: Sitting, Cuff Size: Adult Regular)   Pulse 106   Temp 98.4  F (36.9  C) (Oral)   Resp 16   Wt 72.7 kg (160 lb 4.8 oz)   SpO2 99%   BMI 26.87 kg/m   Estimated body mass index is 26.87 kg/m  as calculated from the following:    Height as of 11/10/21: 1.645 m (5' 4.76\").    Weight as of this encounter: 72.7 kg (160 lb 4.8 oz). Body surface area is 1.82 meters squared.  Mild Pain (2) Comment: and nausea   No LMP for male patient.  Allergies reviewed: Yes  Medications reviewed: Yes    Medications: Medication refills not needed today.  Pharmacy name entered into [employer]:    [facility] [place], [place] - [address]  [facility] [place], [place] - [address]    Clinical concerns: Has trouble sleeping since starting higher dose of gabapentin in the evening.  Notes that pain has subsided with this dose.       La Kennedy CMA            "

## 2021-11-15 NOTE — PROGRESS NOTES
I met with June Ronquillo to review his medication list after hospital discharge post-transplant. I reconciled the medications being taken in the hospital with what was prescribed on discharge to ensure all of the medications needed were prescribed and appropriate.  June Ronquillo had the opportunity to ask questions regarding his therapy which were answered to their satisfaction. We specifically discussed the following items:    1. Pill box:  June did not bring his pill box, but felt very comfortable utilizing it.  2. Potassium:  He was having a difficult time getting his potassium filled.  He will stop by the pharmacy today to find out how to get it filled.    Changes made to scheduled medication list by today's provider include:  -No changes made    Current Outpatient Medications   Medication Sig Dispense Refill     acyclovir (ZOVIRAX) 800 MG tablet Take 1 tablet (800 mg) by mouth 2 times daily 60 tablet 3     calcium carb-cholecalciferol (OS-HARDEEP) 500-200 MG-UNIT tablet Take 1 tablet by mouth daily 30 tablet 1     fluconazole (DIFLUCAN) 200 MG tablet Take 1 tablet (200 mg) by mouth daily 30 tablet 1     gabapentin (NEURONTIN) 300 MG capsule Take 1 capsule (300mg) in the morning and 2 capsules (600mg) in the evening 90 capsule 1     levofloxacin (LEVAQUIN) 250 MG tablet Take 1 tablet (250 mg) by mouth daily 30 tablet 1     levothyroxine (SYNTHROID/LEVOTHROID) 50 MCG tablet Take 1 tablet (50 mcg) by mouth daily 90 tablet 1     losartan (COZAAR) 50 MG tablet Take 1 tablet (50 mg) by mouth daily 90 tablet 2     ondansetron (ZOFRAN) 8 MG tablet Take 1 tablet (8 mg) by mouth every 8 hours as needed for nausea 60 tablet 1     pantoprazole (PROTONIX) 40 MG EC tablet Take 1 tablet (40 mg) by mouth daily 30 tablet 0     potassium chloride ER (KLOR-CON M) 10 MEQ CR tablet Take 2 tablets (20 mEq) by mouth daily 30 tablet 3     prochlorperazine (COMPAZINE) 10 MG tablet Take 1 tablet (10 mg) by mouth every 6  hours as needed (Nausea/Vomiting) 30 tablet 5     vitamin B complex with vitamin C (STRESS TAB) tablet Take 1 tablet by mouth daily *do not start until 2 weeks post transplant (11/24) 30 tablet 1        Pertinent labs considered today:  Lab Results   Component Value Date     11/15/2021     07/09/2021                 normal sodium 136-148  Lab Results   Component Value Date    POTASSIUM 3.9 11/15/2021    POTASSIUM 3.7 07/09/2021       normal potassium 3.5-5.2   Lab Results   Component Value Date    CHLORIDE 100 11/15/2021    CHLORIDE 107 07/09/2021           normal chloride    Lab Results   Component Value Date    CO2 23 11/15/2021    CO2 24 07/09/2021                normal CO2 20-32  Lab Results   Component Value Date    BUN 20 11/15/2021    BUN 8 07/09/2021                 normal BUN 5-24  Lab Results   Component Value Date     11/15/2021    GLC 91 07/09/2021                 normal glucose   Lab Results   Component Value Date    CR 0.87 11/15/2021    CR 0.87 07/09/2021                 normal cr 0.8-1.5  Lab Results   Component Value Date    HARDEEP 8.4 11/15/2021    HARDEEP 8.4 07/09/2021               normal calcium  8.5-10.4  Lab Results   Component Value Date    BILITOTAL 0.4 11/15/2021    BILITOTAL 0.4 07/09/2021          normal bilirubin 0.2-1.3  Lab Results   Component Value Date    PROTTOTAL 6.5 11/15/2021    PROTTOTAL 6.1 07/09/2021          normal total protein 6.0-8.2  Lab Results   Component Value Date    ALBUMIN 3.7 11/15/2021    ALBUMIN 3.2 07/09/2021             normal albumin 3.2-4.5  Lab Results   Component Value Date    ALKPHOS 74 11/15/2021    ALKPHOS 76 07/09/2021            normal alkphos   Lab Results   Component Value Date    ALT 18 11/15/2021    ALT 25 07/09/2021         normal ALT 0-65  Lab Results   Component Value Date    AST 11 11/15/2021    AST 23 07/09/2021         normal AST 0-37  Lab Results   Component Value Date    HGB 11.7 11/15/2021    HGB 10.9  07/09/2021     Lab Results   Component Value Date    WBC 2.4 11/15/2021    WBC 9.6 07/09/2021      Lab Results   Component Value Date     11/15/2021     07/09/2021     Estimated Creatinine Clearance: 101 mL/min (based on SCr of 0.87 mg/dL).    Thank you,  Andy J. Kurtzweil, PharmD

## 2021-11-15 NOTE — NURSING NOTE
Chief Complaint   Patient presents with     Blood Draw     Labs drawn via cvc by RN in lab. VS taken.      Labs collected from CVC by RN. Brown line flushed with saline and heparin, blue line heparin locked. Vitals taken. Pt checked in for appointment(s).    Nunu Santoyo RN

## 2021-11-15 NOTE — PROGRESS NOTES
Background: Care Coordination referral placed from [facility] discharge report for reason of patient meeting criteria for a TCM outreach call by [facility] team.    Assessment: Upon chart review, [facility] Team member will cancel/close the referral for TCM outreach due to reason below:    Patient has been contacted by a clinic RN or provider within [facility] for reason of discussing hospital follow up plan of care and answering questions patient may have related to discharge instructions. Followed by Transplant team.    Plan: Care Coordination referral for TCM outreach canceled to minimize duplicative efforts.    Kylee Lopez MA  [facility], [facility]

## 2021-11-15 NOTE — PROGRESS NOTES
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 Wonders why he is thirstier at night and having difficulty sleeping. He thinks its from the gabapentin. Eating/drinking okay. No diarrhea.     ROS: Negative except as stated above in HPI     Physical Exam  /76 (BP Location: Right arm, Patient Position: Sitting, Cuff Size: Adult Regular)   Pulse 106   Temp 98.4  F (36.9  C) (Oral)   Resp 16   Wt 72.7 kg (160 lb 4.8 oz)   SpO2 99%   BMI 26.87 kg/m    Wt Readings from Last 4 Encounters:   11/15/21 72.7 kg (160 lb 4.8 oz)   11/14/21 73.5 kg (162 lb)   11/13/21 73.8 kg (162 lb 11.2 oz)   11/12/21 74.6 kg (164 lb 7.4 oz)     General: NAD   Eyes: sclera anicteric   Nose/Mouth/Throat: masked  Lungs: CTA bilaterally  Cardiovascular: RRR, no M/R/G   Abdominal/Rectal: +BS, soft, NT, ND   Lymphatics: No edema  Skin: No rash  Neuro: A&O   Additional Findings: Weldon site NT, no drainage.     Labs  Lab Results   Component Value Date    WBC 2.4 (L) 11/15/2021    ANEU 3.0 11/14/2021    HGB 11.7 (L) 11/15/2021    HCT 34.9 (L) 11/15/2021     11/15/2021     11/14/2021    POTASSIUM 3.6 11/14/2021    CHLORIDE 103 11/14/2021    CO2 23 11/14/2021     (H) 11/14/2021    BUN 19 11/14/2021    CR 0.88 11/14/2021    MAG 2.2 11/14/2021    INR 1.06 11/14/2021    BILITOTAL 0.5 11/14/2021    AST 14 11/14/2021    ALT 18 11/14/2021    ALKPHOS 70 11/14/2021    PROTTOTAL 6.1 (L) 11/14/2021    ALBUMIN 3.6 11/14/2021         A/P:  June Ronquillo is a 51 year old male with high risk multiple myeloma, D+4 s/p auto BMT     Prep: melphalan on D-1  Transplant 11/11        1. BMT  - BMT doc/Coordinator: Miguelangel/Du Newsome; Lorelei/Christine  - Cell dose 6.99 x 10^6; gave all cells due to high risk disease  - GCSF starts day +5, continue until  ANC > 2500 for 2 consecutive days.      2. HEME/COAG  - Transfusion parameters: hgb <7g/dL and plts <10,000   no transfusions needed today    3. ID  - No active infections.   Prophy:  Viral: ACV BID (CMV+)  Bacterial: levofloxacin 250mg daily  Fungal: fluc  PCP: Bactrim or other PCP prophy to start at day +28     4. GI/NUTRITION  - s/p antiemetics pre chemo.  prn zofran compazine and ativan     - Ulcer prophy: Protonix 40mg daily.      5. CV  - Hx of CAD, MI at age 28 with stent placed  - HTN: cont losartan. Hold hydrochlorothiazide through transplant  - Hyperlipidemia: hold statin through transplant     6 RENAL/  FEN  - Monitor Cr and electrolytes daily.   - Replace electrolytes per sliding scale     7. ENDOCRINE  - hx hypothyroidism  - new diagnosis of papillary thyroid carcinoma per bx result from 11/2, appears that the plan is for thyroidectomy after BMT     8 SUPPORTIVE CARE  - PT/OT to evaluate on admission and follow as indicated.      9. Neuro  - peripheral neuropathy: started gabapentin 300mg BID, pm dose to 600mg per patient. He will try 600 in AM and 300in PM to see if helps with sleep.   - start B complex vitamin (contains vit C) about 2 weeks post transplant to avoid interaction with chemo       RTC    - starts GCSF on Tues 11/16    Christine Milian NP

## 2021-11-15 NOTE — PROGRESS NOTES
Prior Authorization Approval    ondansetron 8mg tabs  Date Initiated: 11/11/2021  Date Completed: 11/12/2021  Prior Auth Type: Quantity Limit                Status: Approved    Effective Date: 11/12/2021 - 05/11/2022  Copay: 3.37     Filling Pharmacy: [facility] - [address]    Insurance: [employer] - Phone [phone] Fax [phone]  ID: [id number]  Case Number: [id number] / [id number]   Submitted Via: Morenita Gonzalez  [facility] Liaison  Ph: [phone] Pager: [phone]

## 2021-11-16 ENCOUNTER — ONCOLOGY VISIT (OUTPATIENT)
Dept: TRANSPLANT | Facility: CLINIC | Age: 51
End: 2021-11-16
Attending: INTERNAL MEDICINE
Payer: COMMERCIAL

## 2021-11-16 ENCOUNTER — LAB (OUTPATIENT)
Dept: LAB | Facility: CLINIC | Age: 51
End: 2021-11-16
Attending: INTERNAL MEDICINE
Payer: COMMERCIAL

## 2021-11-16 ENCOUNTER — HOME INFUSION (PRE-WILLOW HOME INFUSION) (OUTPATIENT)
Dept: PHARMACY | Facility: CLINIC | Age: 51
End: 2021-11-16
Payer: COMMERCIAL

## 2021-11-16 VITALS
SYSTOLIC BLOOD PRESSURE: 129 MMHG | BODY MASS INDEX: 26.79 KG/M2 | DIASTOLIC BLOOD PRESSURE: 90 MMHG | OXYGEN SATURATION: 99 % | WEIGHT: 159.8 LBS | HEART RATE: 109 BPM | TEMPERATURE: 98.9 F | RESPIRATION RATE: 16 BRPM

## 2021-11-16 DIAGNOSIS — Z52.011 AUTOLOGOUS DONOR, STEM CELLS: Primary | ICD-10-CM

## 2021-11-16 DIAGNOSIS — C90.00 MULTIPLE MYELOMA NOT HAVING ACHIEVED REMISSION (H): ICD-10-CM

## 2021-11-16 LAB
ANION GAP SERPL CALCULATED.3IONS-SCNC: 9 MMOL/L (ref 3–14)
BASOPHILS # BLD MANUAL: 0 10E3/UL (ref 0–0.2)
BASOPHILS NFR BLD MANUAL: 0 %
BUN SERPL-MCNC: 12 MG/DL (ref 7–30)
CALCIUM SERPL-MCNC: 8.4 MG/DL (ref 8.5–10.1)
CHLORIDE BLD-SCNC: 102 MMOL/L (ref 94–109)
CO2 SERPL-SCNC: 21 MMOL/L (ref 20–32)
CREAT SERPL-MCNC: 0.81 MG/DL (ref 0.66–1.25)
ELLIPTOCYTES BLD QL SMEAR: SLIGHT
EOSINOPHIL # BLD MANUAL: 0 10E3/UL (ref 0–0.7)
EOSINOPHIL NFR BLD MANUAL: 2 %
ERYTHROCYTE [DISTWIDTH] IN BLOOD BY AUTOMATED COUNT: 14.2 % (ref 10–15)
GFR SERPL CREATININE-BSD FRML MDRD: >90 ML/MIN/1.73M2
GLUCOSE BLD-MCNC: 132 MG/DL (ref 70–99)
HCT VFR BLD AUTO: 34.3 % (ref 40–53)
HGB BLD-MCNC: 11.7 G/DL (ref 13.3–17.7)
LYMPHOCYTES # BLD MANUAL: 0.1 10E3/UL (ref 0.8–5.3)
LYMPHOCYTES NFR BLD MANUAL: 14 %
MCH RBC QN AUTO: 30.6 PG (ref 26.5–33)
MCHC RBC AUTO-ENTMCNC: 34.1 G/DL (ref 31.5–36.5)
MCV RBC AUTO: 90 FL (ref 78–100)
MONOCYTES # BLD MANUAL: 0 10E3/UL (ref 0–1.3)
MONOCYTES NFR BLD MANUAL: 2 %
NEUTROPHILS # BLD MANUAL: 0.8 10E3/UL (ref 1.6–8.3)
NEUTROPHILS NFR BLD MANUAL: 82 %
PLAT MORPH BLD: ABNORMAL
PLATELET # BLD AUTO: 197 10E3/UL (ref 150–450)
POTASSIUM BLD-SCNC: 4.1 MMOL/L (ref 3.4–5.3)
RBC # BLD AUTO: 3.82 10E6/UL (ref 4.4–5.9)
RBC MORPH BLD: ABNORMAL
SODIUM SERPL-SCNC: 132 MMOL/L (ref 133–144)
WBC # BLD AUTO: 1 10E3/UL (ref 4–11)

## 2021-11-16 PROCEDURE — 87493 C DIFF AMPLIFIED PROBE: CPT

## 2021-11-16 PROCEDURE — 96372 THER/PROPH/DIAG INJ SC/IM: CPT | Performed by: PHYSICIAN ASSISTANT

## 2021-11-16 PROCEDURE — 250N000011 HC RX IP 250 OP 636: Performed by: PHYSICIAN ASSISTANT

## 2021-11-16 PROCEDURE — 258N000003 HC RX IP 258 OP 636: Performed by: NURSE PRACTITIONER

## 2021-11-16 PROCEDURE — 250N000011 HC RX IP 250 OP 636: Performed by: INTERNAL MEDICINE

## 2021-11-16 PROCEDURE — 80048 BASIC METABOLIC PNL TOTAL CA: CPT

## 2021-11-16 PROCEDURE — 85027 COMPLETE CBC AUTOMATED: CPT

## 2021-11-16 PROCEDURE — 99214 OFFICE O/P EST MOD 30 MIN: CPT

## 2021-11-16 PROCEDURE — G0463 HOSPITAL OUTPT CLINIC VISIT: HCPCS | Mod: 25

## 2021-11-16 PROCEDURE — 36592 COLLECT BLOOD FROM PICC: CPT

## 2021-11-16 RX ORDER — HEPARIN SODIUM,PORCINE 10 UNIT/ML
5 VIAL (ML) INTRAVENOUS
Status: DISCONTINUED | OUTPATIENT
Start: 2021-11-16 | End: 2021-11-16 | Stop reason: HOSPADM

## 2021-11-16 RX ORDER — HEPARIN SODIUM (PORCINE) LOCK FLUSH IV SOLN 100 UNIT/ML 100 UNIT/ML
5 SOLUTION INTRAVENOUS
Status: CANCELLED | OUTPATIENT
Start: 2021-11-17

## 2021-11-16 RX ORDER — HEPARIN SODIUM,PORCINE 10 UNIT/ML
5 VIAL (ML) INTRAVENOUS
Status: CANCELLED | OUTPATIENT
Start: 2021-11-17

## 2021-11-16 RX ADMIN — Medication 5 ML: at 13:08

## 2021-11-16 RX ADMIN — FILGRASTIM 480 MCG: 480 INJECTION, SOLUTION INTRAVENOUS; SUBCUTANEOUS at 13:54

## 2021-11-16 RX ADMIN — SODIUM CHLORIDE 1000 ML: 9 INJECTION, SOLUTION INTRAVENOUS at 13:31

## 2021-11-16 RX ADMIN — SODIUM CHLORIDE, PRESERVATIVE FREE 5 ML: 5 INJECTION INTRAVENOUS at 14:37

## 2021-11-16 RX ADMIN — SODIUM CHLORIDE, PRESERVATIVE FREE 5 ML: 5 INJECTION INTRAVENOUS at 13:08

## 2021-11-16 ASSESSMENT — PAIN SCALES - GENERAL: PAINLEVEL: MODERATE PAIN (4)

## 2021-11-16 NOTE — LETTER
11/16/2021         RE: June Ronquillo  [address]  [place] [address]        Dear Colleague,    Thank you for referring your patient, June Ronquillo, to the [facility] BLOOD AND MARROW TRANSPLANT PROGRAM [place]. Please see a copy of my visit note below.    Infusion Nursing Note:  June Ronquillo presents today for 1L IVF, Subcutaneous Neupogen.    Patient seen by provider today: Yes: Candice Milian   present during visit today: Not Applicable.    Note: Labs monitored      Intravenous Access:  Weldon.  Flushes well with +BR    Treatment Conditions:  Lab Results   Component Value Date    HGB 11.7 (L) 11/16/2021    WBC 1.0 (L) 11/16/2021    ANEU 0.8 (L) 11/16/2021    ANEUTAUTO 2.2 11/15/2021     11/16/2021      Lab Results   Component Value Date     (L) 11/16/2021    POTASSIUM 4.1 11/16/2021    MAG 2.3 11/15/2021    CR 0.81 11/16/2021    HARDEEP 8.4 (L) 11/16/2021    BILITOTAL 0.4 11/15/2021    ALBUMIN 3.7 11/15/2021    ALT 18 11/15/2021    AST 11 11/15/2021     1 L IVF fluids given, Subcutaneous Neupogen given in RLQ, signed in BMT therapy plan. No complications.     Post Infusion Assessment:  Patient tolerated infusion without incident.  Patient tolerated injection without incident.   Red lumen heparin locked.     Discharge Plan:   Patient discharged in stable condition accompanied by: self.    Stool sample kit for C-diff given to patient. Patient provided instructions on how to collect sample.    Alma Rosa Lea, ZACK                          Again, thank you for allowing me to participate in the care of your patient.        Sincerely,        [facility]

## 2021-11-16 NOTE — PROGRESS NOTES
This is a recent snapshot of the patient's [facility] medical record.  For current drug dose and complete information and questions, call [phone] or In Basket pool, fv home infusion ([id number])  CSN Number:  [id number]

## 2021-11-16 NOTE — PROGRESS NOTES
Infusion Nursing Note:  June Ronquillo presents today for 1L IVF, Subcutaneous Neupogen.    Patient seen by provider today: Yes: Candice Milian   present during visit today: Not Applicable.    Note: Labs monitored      Intravenous Access:  Weldon.  Flushes well with +BR    Treatment Conditions:  Lab Results   Component Value Date    HGB 11.7 (L) 11/16/2021    WBC 1.0 (L) 11/16/2021    ANEU 0.8 (L) 11/16/2021    ANEUTAUTO 2.2 11/15/2021     11/16/2021      Lab Results   Component Value Date     (L) 11/16/2021    POTASSIUM 4.1 11/16/2021    MAG 2.3 11/15/2021    CR 0.81 11/16/2021    HARDEEP 8.4 (L) 11/16/2021    BILITOTAL 0.4 11/15/2021    ALBUMIN 3.7 11/15/2021    ALT 18 11/15/2021    AST 11 11/15/2021     1 L IVF fluids given, Subcutaneous Neupogen given in RLQ, signed in BMT therapy plan. No complications.     Post Infusion Assessment:  Patient tolerated infusion without incident.  Patient tolerated injection without incident.   Red lumen heparin locked.     Discharge Plan:   Patient discharged in stable condition accompanied by: self.    Stool sample kit for C-diff given to patient. Patient provided instructions on how to collect sample.    Alma Rosa Lea RN

## 2021-11-16 NOTE — LETTER
11/16/2021         RE: June Ronquillo  [address]  [place] [address]        Dear Colleague,    Thank you for referring your patient, June Ronquillo, to the [facility] BLOOD AND MARROW TRANSPLANT PROGRAM [place]. Please see a copy of my visit note below.                BMT Daily Progress Note   November 16, 2021      ID: June Ronquillo is a 51 year old male, currently day+5 s/p his autologous hematopoietic cell transplant for myeloma     HPI: Feeling worse today. Still with intermittent nausea, no emesis and now diarrhea. It's not completely water as he ate something yesterday but it's loose. Ate only toast so far today. Taking zofran but did have a HA this morning for which he took a tylenol. No fevers. No dizziness. Trying to drink fluids.    ROS: Negative except as stated above in HPI     Physical Exam  BP (!) 129/90 (BP Location: Right arm, Patient Position: Sitting, Cuff Size: Adult Regular)   Pulse 109   Temp 98.9  F (37.2  C) (Tympanic)   Resp 16   Wt 72.5 kg (159 lb 12.8 oz)   SpO2 99%   BMI 26.79 kg/m    Wt Readings from Last 4 Encounters:   11/16/21 72.5 kg (159 lb 12.8 oz)   11/15/21 72.7 kg (160 lb 4.8 oz)   11/14/21 73.5 kg (162 lb)   11/13/21 73.8 kg (162 lb 11.2 oz)     General: NAD   Eyes: sclera anicteric   Nose/Mouth/Throat: masked  Lungs: CTA bilaterally  Cardiovascular: RRR, no M/R/G   Abdominal/Rectal: +BS, soft, NT, ND   Lymphatics: No edema  Skin: No rash  Neuro: A&O   Additional Findings: Weldon site NT, no drainage.     Labs       Lab Results   Component Value Date    WBC 1.0 (L) 11/16/2021    ANEU 3.0 11/14/2021    HGB 11.7 (L) 11/16/2021    HCT 34.3 (L) 11/16/2021     11/16/2021     (L) 11/16/2021    POTASSIUM 4.1 11/16/2021    CHLORIDE 102 11/16/2021    CO2 21 11/16/2021     (H) 11/16/2021    BUN 12 11/16/2021    CR 0.81 11/16/2021    MAG 2.3 11/15/2021    INR 1.06 11/14/2021    BILITOTAL 0.4 11/15/2021    AST 11  11/15/2021    ALT 18 11/15/2021    ALKPHOS 74 11/15/2021    PROTTOTAL 6.5 (L) 11/15/2021    ALBUMIN 3.7 11/15/2021       A/P:  Jioscardrapal KINDRA Ronquillo is a 51 year old male with high risk multiple myeloma, D+5 s/p auto BMT     Prep: melphalan on D-1  Transplant 11/11        1. BMT  - BMT doc/Coordinator: Miguelangel/Du Newsome; Lorelei/Christine  - Cell dose 6.99 x 10^6; gave all cells due to high risk disease  - GCSF starts day +5, continue until ANC > 2500 for 2 consecutive days.      2. HEME/COAG  - Transfusion parameters: hgb <7g/dL and plts <10,000   no transfusions needed today    3. ID. New diarrhea, stool culture kit sent  - No active infections.   Prophy:  Viral: ACV BID (CMV+)  Bacterial: levofloxacin 250mg daily  Fungal: fluc  PCP: Bactrim or other PCP prophy to start at day +28     4. GI/NUTRITION  - s/p antiemetics pre chemo.  prn zofran compazine and ativan. Advised him to alternate zofran/compazine for now.      - Ulcer prophy: Protonix 40mg daily.      5. CV  - Hx of CAD, MI at age 28 with stent placed  - HTN: cont losartan. Hold hydrochlorothiazide through transplant  - Hyperlipidemia: hold statin through transplant     6 RENAL/  FEN  - Monitor Cr and electrolytes daily.   - Replace electrolytes per sliding scale  - 1L NS today     7. ENDOCRINE  - hx hypothyroidism  - new diagnosis of papillary thyroid carcinoma per bx result from 11/2, appears that the plan is for thyroidectomy after BMT     8 SUPPORTIVE CARE  - PT/OT to evaluate on admission and follow as indicated.      9. Neuro  - peripheral neuropathy: started gabapentin 300mg BID, pm dose to 600mg per patient. He will try 600 in AM and 300in PM to see if helps with sleep.   - start B complex vitamin (contains vit C) about 2 weeks post transplant to avoid interaction with chemo       RTC    - daily for gcsf +/- IVF/elpradip Milian NP                          Again, thank you for allowing me to participate in the care of your patient.         Sincerely,        BMT Advanced Practice Provider

## 2021-11-16 NOTE — NURSING NOTE
Chief Complaint   Patient presents with     Blood Draw     labs drawn from cvc by rn.  vs taken     Labs drawn from CVC by rn.  Good blood return noted in both lumens.  Both lumens flushed with NS and heparin.  Pt tolerated well.  VS taken.  Pt checked in for next appt.    Aubrie Fleming RN

## 2021-11-16 NOTE — NURSING NOTE
"Oncology Rooming Note    November 16, 2021 1:13 PM   June Ronquillo is a 51 year old male who presents for:    Chief Complaint   Patient presents with     Blood Draw     labs drawn from cvc by rn.  vs taken     Oncology Clinic Visit     Multiple myeloma      Initial Vitals: BP (!) 129/90 (BP Location: Right arm, Patient Position: Sitting, Cuff Size: Adult Regular)   Pulse 109   Temp 98.9  F (37.2  C) (Tympanic)   Resp 16   Wt 72.5 kg (159 lb 12.8 oz)   SpO2 99%   BMI 26.79 kg/m   Estimated body mass index is 26.79 kg/m  as calculated from the following:    Height as of 11/10/21: 1.645 m (5' 4.76\").    Weight as of this encounter: 72.5 kg (159 lb 12.8 oz). Body surface area is 1.82 meters squared.  Moderate Pain (4) Comment: Data Unavailable   No LMP for male patient.  Allergies reviewed: Yes  Medications reviewed: Yes    Medications: Medication refills not needed today.  Pharmacy name entered into [employer]:    [facility] [place], [place] - [address]  [facility] [place], [place] - [address]    Clinical concerns: In more pain today, increase in nausea and diarrhea. More tired than normal too. States today is the worst post transplant      Elenita Gatica LPN            "

## 2021-11-16 NOTE — PROGRESS NOTES
BMT Daily Progress Note   November 16, 2021      ID: June Ronquillo is a 51 year old male, currently day+5 s/p his autologous hematopoietic cell transplant for myeloma     HPI: Feeling worse today. Still with intermittent nausea, no emesis and now diarrhea. It's not completely water as he ate something yesterday but it's loose. Ate only toast so far today. Taking zofran but did have a HA this morning for which he took a tylenol. No fevers. No dizziness. Trying to drink fluids.    ROS: Negative except as stated above in HPI     Physical Exam  BP (!) 129/90 (BP Location: Right arm, Patient Position: Sitting, Cuff Size: Adult Regular)   Pulse 109   Temp 98.9  F (37.2  C) (Tympanic)   Resp 16   Wt 72.5 kg (159 lb 12.8 oz)   SpO2 99%   BMI 26.79 kg/m    Wt Readings from Last 4 Encounters:   11/16/21 72.5 kg (159 lb 12.8 oz)   11/15/21 72.7 kg (160 lb 4.8 oz)   11/14/21 73.5 kg (162 lb)   11/13/21 73.8 kg (162 lb 11.2 oz)     General: NAD   Eyes: sclera anicteric   Nose/Mouth/Throat: masked  Lungs: CTA bilaterally  Cardiovascular: RRR, no M/R/G   Abdominal/Rectal: +BS, soft, NT, ND   Lymphatics: No edema  Skin: No rash  Neuro: A&O   Additional Findings: Weldon site NT, no drainage.     Labs       Lab Results   Component Value Date    WBC 1.0 (L) 11/16/2021    ANEU 3.0 11/14/2021    HGB 11.7 (L) 11/16/2021    HCT 34.3 (L) 11/16/2021     11/16/2021     (L) 11/16/2021    POTASSIUM 4.1 11/16/2021    CHLORIDE 102 11/16/2021    CO2 21 11/16/2021     (H) 11/16/2021    BUN 12 11/16/2021    CR 0.81 11/16/2021    MAG 2.3 11/15/2021    INR 1.06 11/14/2021    BILITOTAL 0.4 11/15/2021    AST 11 11/15/2021    ALT 18 11/15/2021    ALKPHOS 74 11/15/2021    PROTTOTAL 6.5 (L) 11/15/2021    ALBUMIN 3.7 11/15/2021       A/P:  Jioscardrapal KINDRA Ronquillo is a 51 year old male with high risk multiple myeloma, D+5 s/p auto BMT     Prep: melphalan on D-1  Transplant 11/11        1. BMT  - BMT  doc/Coordinator: Ricardo Fuentes  - Cell dose 6.99 x 10^6; gave all cells due to high risk disease  - GCSF starts day +5, continue until ANC > 2500 for 2 consecutive days.      2. HEME/COAG  - Transfusion parameters: hgb <7g/dL and plts <10,000   no transfusions needed today    3. ID. New diarrhea, stool culture kit sent  - No active infections.   Prophy:  Viral: ACV BID (CMV+)  Bacterial: levofloxacin 250mg daily  Fungal: fluc  PCP: Bactrim or other PCP prophy to start at day +28     4. GI/NUTRITION  - s/p antiemetics pre chemo.  prn zofran compazine and ativan. Advised him to alternate zofran/compazine for now.      - Ulcer prophy: Protonix 40mg daily.      5. CV  - Hx of CAD, MI at age 28 with stent placed  - HTN: cont losartan. Hold hydrochlorothiazide through transplant  - Hyperlipidemia: hold statin through transplant     6 RENAL/  FEN  - Monitor Cr and electrolytes daily.   - Replace electrolytes per sliding scale  - 1L NS today     7. ENDOCRINE  - hx hypothyroidism  - new diagnosis of papillary thyroid carcinoma per bx result from 11/2, appears that the plan is for thyroidectomy after BMT     8 SUPPORTIVE CARE  - PT/OT to evaluate on admission and follow as indicated.      9. Neuro  - peripheral neuropathy: started gabapentin 300mg BID, pm dose to 600mg per patient. He will try 600 in AM and 300in PM to see if helps with sleep.   - start B complex vitamin (contains vit C) about 2 weeks post transplant to avoid interaction with chemo       RTC    - daily for gcsf +/- IVF/edwina Milian NP

## 2021-11-16 NOTE — LETTER
Date:December 20, 2021      Provider requested that no letter be sent. Do not send.       [facility]

## 2021-11-16 NOTE — NURSING NOTE
Chief Complaint   Patient presents with     Infusion     Add on Infusion r/t MM     1 L IVF, Subcutaneous Neupogen.    Alma Rosa Lea RN

## 2021-11-17 ENCOUNTER — ONCOLOGY VISIT (OUTPATIENT)
Dept: TRANSPLANT | Facility: CLINIC | Age: 51
End: 2021-11-17
Attending: INTERNAL MEDICINE
Payer: COMMERCIAL

## 2021-11-17 ENCOUNTER — APPOINTMENT (OUTPATIENT)
Dept: LAB | Facility: CLINIC | Age: 51
End: 2021-11-17
Attending: INTERNAL MEDICINE
Payer: COMMERCIAL

## 2021-11-17 ENCOUNTER — INFUSION THERAPY VISIT (OUTPATIENT)
Dept: TRANSPLANT | Facility: CLINIC | Age: 51
End: 2021-11-17
Attending: PHYSICIAN ASSISTANT
Payer: COMMERCIAL

## 2021-11-17 VITALS
HEART RATE: 99 BPM | DIASTOLIC BLOOD PRESSURE: 84 MMHG | SYSTOLIC BLOOD PRESSURE: 125 MMHG | TEMPERATURE: 97.6 F | RESPIRATION RATE: 16 BRPM | BODY MASS INDEX: 26.7 KG/M2 | WEIGHT: 159.3 LBS | OXYGEN SATURATION: 100 %

## 2021-11-17 DIAGNOSIS — Z52.011 AUTOLOGOUS DONOR, STEM CELLS: ICD-10-CM

## 2021-11-17 DIAGNOSIS — C90.00 MULTIPLE MYELOMA NOT HAVING ACHIEVED REMISSION (H): Primary | ICD-10-CM

## 2021-11-17 DIAGNOSIS — Z94.81 STATUS POST BONE MARROW TRANSPLANT (H): ICD-10-CM

## 2021-11-17 LAB
ANION GAP SERPL CALCULATED.3IONS-SCNC: 9 MMOL/L (ref 3–14)
BACTERIA BLD CULT: NO GROWTH
BACTERIA BLD CULT: NO GROWTH
BASOPHILS # BLD MANUAL: 0 10E3/UL (ref 0–0.2)
BASOPHILS NFR BLD MANUAL: 0 %
BUN SERPL-MCNC: 11 MG/DL (ref 7–30)
BURR CELLS BLD QL SMEAR: SLIGHT
C DIFF TOX B STL QL: NEGATIVE
CALCIUM SERPL-MCNC: 8 MG/DL (ref 8.5–10.1)
CHLORIDE BLD-SCNC: 105 MMOL/L (ref 94–109)
CO2 SERPL-SCNC: 22 MMOL/L (ref 20–32)
CREAT SERPL-MCNC: 0.8 MG/DL (ref 0.66–1.25)
ELLIPTOCYTES BLD QL SMEAR: SLIGHT
EOSINOPHIL # BLD MANUAL: 0 10E3/UL (ref 0–0.7)
EOSINOPHIL NFR BLD MANUAL: 0 %
ERYTHROCYTE [DISTWIDTH] IN BLOOD BY AUTOMATED COUNT: 14.3 % (ref 10–15)
GFR SERPL CREATININE-BSD FRML MDRD: >90 ML/MIN/1.73M2
GLUCOSE BLD-MCNC: 128 MG/DL (ref 70–99)
HCT VFR BLD AUTO: 33.8 % (ref 40–53)
HGB BLD-MCNC: 11.2 G/DL (ref 13.3–17.7)
LYMPHOCYTES # BLD MANUAL: 0.2 10E3/UL (ref 0.8–5.3)
LYMPHOCYTES NFR BLD MANUAL: 9 %
MCH RBC QN AUTO: 30.4 PG (ref 26.5–33)
MCHC RBC AUTO-ENTMCNC: 33.1 G/DL (ref 31.5–36.5)
MCV RBC AUTO: 92 FL (ref 78–100)
MONOCYTES # BLD MANUAL: 0 10E3/UL (ref 0–1.3)
MONOCYTES NFR BLD MANUAL: 0 %
NEUTROPHILS # BLD MANUAL: 1.7 10E3/UL (ref 1.6–8.3)
NEUTROPHILS NFR BLD MANUAL: 91 %
PLAT MORPH BLD: ABNORMAL
PLATELET # BLD AUTO: 128 10E3/UL (ref 150–450)
POTASSIUM BLD-SCNC: 3.9 MMOL/L (ref 3.4–5.3)
RBC # BLD AUTO: 3.68 10E6/UL (ref 4.4–5.9)
RBC MORPH BLD: ABNORMAL
SODIUM SERPL-SCNC: 136 MMOL/L (ref 133–144)
WBC # BLD AUTO: 1.9 10E3/UL (ref 4–11)

## 2021-11-17 PROCEDURE — 96372 THER/PROPH/DIAG INJ SC/IM: CPT | Performed by: PHYSICIAN ASSISTANT

## 2021-11-17 PROCEDURE — 36592 COLLECT BLOOD FROM PICC: CPT

## 2021-11-17 PROCEDURE — 80048 BASIC METABOLIC PNL TOTAL CA: CPT

## 2021-11-17 PROCEDURE — 99213 OFFICE O/P EST LOW 20 MIN: CPT

## 2021-11-17 PROCEDURE — G0463 HOSPITAL OUTPT CLINIC VISIT: HCPCS | Mod: 25

## 2021-11-17 PROCEDURE — 250N000011 HC RX IP 250 OP 636: Performed by: PHYSICIAN ASSISTANT

## 2021-11-17 PROCEDURE — 250N000011 HC RX IP 250 OP 636: Performed by: INTERNAL MEDICINE

## 2021-11-17 PROCEDURE — 85027 COMPLETE CBC AUTOMATED: CPT

## 2021-11-17 PROCEDURE — 258N000003 HC RX IP 258 OP 636: Performed by: PHYSICIAN ASSISTANT

## 2021-11-17 PROCEDURE — 96360 HYDRATION IV INFUSION INIT: CPT

## 2021-11-17 RX ORDER — HEPARIN SODIUM (PORCINE) LOCK FLUSH IV SOLN 100 UNIT/ML 100 UNIT/ML
5 SOLUTION INTRAVENOUS
Status: CANCELLED | OUTPATIENT
Start: 2021-11-18

## 2021-11-17 RX ORDER — HEPARIN SODIUM,PORCINE 10 UNIT/ML
5 VIAL (ML) INTRAVENOUS
Status: DISCONTINUED | OUTPATIENT
Start: 2021-11-17 | End: 2021-11-17 | Stop reason: HOSPADM

## 2021-11-17 RX ORDER — HEPARIN SODIUM,PORCINE 10 UNIT/ML
5 VIAL (ML) INTRAVENOUS
Status: CANCELLED | OUTPATIENT
Start: 2021-11-18

## 2021-11-17 RX ORDER — HEPARIN SODIUM,PORCINE 10 UNIT/ML
5 VIAL (ML) INTRAVENOUS ONCE
Status: COMPLETED | OUTPATIENT
Start: 2021-11-17 | End: 2021-11-17

## 2021-11-17 RX ADMIN — SODIUM CHLORIDE 1000 ML: 9 INJECTION, SOLUTION INTRAVENOUS at 15:08

## 2021-11-17 RX ADMIN — SODIUM CHLORIDE, PRESERVATIVE FREE 5 ML: 5 INJECTION INTRAVENOUS at 15:23

## 2021-11-17 RX ADMIN — FILGRASTIM 480 MCG: 480 INJECTION, SOLUTION INTRAVENOUS; SUBCUTANEOUS at 12:56

## 2021-11-17 RX ADMIN — Medication 5 ML: at 12:09

## 2021-11-17 ASSESSMENT — PAIN SCALES - GENERAL: PAINLEVEL: NO PAIN (0)

## 2021-11-17 NOTE — NURSING NOTE
Patient was given Neupogen 480mcg in left lower abdomen. Patient tolerated well. See MAR for details.    Elenita Gatica LPN on 11/17/2021 at 1:06 PM

## 2021-11-17 NOTE — LETTER
Date:December 20, 2021      Provider requested that no letter be sent. Do not send.       [facility]

## 2021-11-17 NOTE — LETTER
11/17/2021         RE: June Ronquillo  [address]  [facility] [address]        Dear Colleague,    Thank you for referring your patient, June Ronquillo, to the [facility] BLOOD AND MARROW TRANSPLANT PROGRAM [place]. Please see a copy of my visit note below.      BMT Daily Progress Note     ID: June Ronquillo is a 51 year old male, currently day +6 s/p his autologous hematopoietic cell transplant.     HPI: Feels a bit better today. Less nauseated.  Felt much better after IVF yesterday. Was able to eat beef lo mein and pizza. Stool is less frequent and less watery, has not taken any imodium. Notes dizziness if he stands quickly but not at rest. No mouth pain, throat pain, or bleeding. No fevers or chills.      ROS: Negative except as stated above in HPI     Physical Exam  /84 (BP Location: Left arm, Patient Position: Sitting, Cuff Size: Adult Regular)   Pulse 99   Temp 97.6  F (36.4  C) (Oral)   Resp 16   Wt 72.3 kg (159 lb 4.8 oz)   SpO2 100%   BMI 26.70 kg/m      Wt Readings from Last 5 Encounters:   11/17/21 72.3 kg (159 lb 4.8 oz)   11/16/21 72.5 kg (159 lb 12.8 oz)   11/15/21 72.7 kg (160 lb 4.8 oz)   11/14/21 73.5 kg (162 lb)   11/13/21 73.8 kg (162 lb 11.2 oz)     General: NAD   Eyes: sclera anicteric   Nose/Mouth/Throat: OP clear, buccal mucosa moist, no ulcerations   Lungs: CTA bilaterally  Cardiovascular: RRR, no M/R/G   Abdominal/Rectal: +BS, soft,   Lymphatics: No edema  Skin: No rashes  Neuro: A&O   Additional Findings: Weldon site NT, no drainage.     Lab Results   Component Value Date    WBC 1.9 (L) 11/17/2021    ANEU 0.8 (L) 11/16/2021    HGB 11.2 (L) 11/17/2021    HCT 33.8 (L) 11/17/2021     (L) 11/17/2021     (L) 11/16/2021    POTASSIUM 4.1 11/16/2021    CHLORIDE 102 11/16/2021    CO2 21 11/16/2021     (H) 11/16/2021    BUN 12 11/16/2021    CR 0.81 11/16/2021    MAG 2.3 11/15/2021    INR 1.06 11/14/2021    BILITOTAL 0.4  11/15/2021    AST 11 11/15/2021    ALT 18 11/15/2021    ALKPHOS 74 11/15/2021    PROTTOTAL 6.5 (L) 11/15/2021    ALBUMIN 3.7 11/15/2021       I have assessed all abnormal lab values for their clinical significance and any values considered clinically significant have been addressed in the assessment and plan.      A/P:  June Ronquillo is a 51 year old male with high risk multiple myeloma, D+6 s/p auto BMT     Prep: melphalan on D-1  Transplant 11/11     1. BMT  - BMT doc/Coordinator: Miguelangel/Du Newsome; Lorelei/Christine  - Cell dose 6.99 x 10^6; gave all cells due to high risk disease  - GCSF started day +5, continue until ANC > 2500 for 2 consecutive days.      2. HEME/COAG  - Transfusion parameters: hgb <7g/dL and plts <10,000  - pancytopenia but not needing transfusions     3. ID. New diarrhea, stool culture kit sent  - No active infections.   Prophy:  Viral: ACV BID (CMV+)  Bacterial: levofloxacin 250mg daily  Fungal: fluc  PCP: Bactrim or other PCP prophy to start at day +28     4. GI/NUTRITION  - s/p antiemetics pre chemo.  prn zofran compazine and ativan. Advised him to alternate zofran/compazine for now.    - loose stools, cdiff neg. 11/16. Imodium prn.   - Ulcer prophy: Protonix 40mg daily.      5. CV  - Hx of CAD, MI at age 28 with stent placed  - HTN: cont losartan. Hold hydrochlorothiazide through transplant  - Hyperlipidemia: hold statin through transplant     6 RENAL/  FEN  - Cr and lytes WNL.   - 1L NS today d/t dizziness. Pt felt much better after IVF yesterday.      7. ENDOCRINE  - hx hypothyroidism  - new diagnosis of papillary thyroid carcinoma per bx result from 11/2, appears that the plan is for thyroidectomy after BMT     8. Neuro  - peripheral neuropathy: started gabapentin 300mg BID, pm dose to 600mg per patient. He will try 600 in AM and 300in PM to see if helps with sleep.   - start B complex vitamin (contains vit C) about 2 weeks post transplant to avoid interaction with chemo     RTC:  daily for labs and provider visit. Possible IVF    25 minutes spent on the date of the encounter doing chart review, history and exam, documentation and further activities per the note      Shukri Moffett PA-C  [phone]        Again, thank you for allowing me to participate in the care of your patient.        Sincerely,        BMT Advanced Practice Provider

## 2021-11-17 NOTE — PROCEDURES
BMT Post Infusion Documentation    Data   No data found.  BMT INFUSION DOCUMENTATION (last 24 hours)     BMT/Cellular Product Infusion    No documentation.                   Post-Infusion Evaluation:   Infusion Related Reaction: Grade 0 - none  Dyspnea: Grade 0 - none  Hypoxia: Grade 0 - not present  Fever: Grade 0 - afebrile  Chills: Grade 0 - none  Febrile Neutropenia: Grade 0 - not present  Sinus Bradycardia: Grade 0 - none  Hypertension: Grade 0 - none  Hypotension: Grade 0 - none  Chest Pain: Grade 0 - none  Bronchospasm: Grade 0 - none  Pain: Grade 0 - none  Rash: Grade 0 - None  Neurologic Specify: none    If this was a cord blood transplant, was more than one cord blood unit infused? n/a    Bibi Delgado PA-C

## 2021-11-17 NOTE — PROGRESS NOTES
BMT Daily Progress Note     ID: June Ronquillo is a 51 year old male, currently day +6 s/p his autologous hematopoietic cell transplant.     HPI: Feels a bit better today. Less nauseated.  Felt much better after IVF yesterday. Was able to eat beef lo mein and pizza. Stool is less frequent and less watery, has not taken any imodium. Notes dizziness if he stands quickly but not at rest. No mouth pain, throat pain, or bleeding. No fevers or chills.      ROS: Negative except as stated above in HPI     Physical Exam  /84 (BP Location: Left arm, Patient Position: Sitting, Cuff Size: Adult Regular)   Pulse 99   Temp 97.6  F (36.4  C) (Oral)   Resp 16   Wt 72.3 kg (159 lb 4.8 oz)   SpO2 100%   BMI 26.70 kg/m      Wt Readings from Last 5 Encounters:   11/17/21 72.3 kg (159 lb 4.8 oz)   11/16/21 72.5 kg (159 lb 12.8 oz)   11/15/21 72.7 kg (160 lb 4.8 oz)   11/14/21 73.5 kg (162 lb)   11/13/21 73.8 kg (162 lb 11.2 oz)     General: NAD   Eyes: sclera anicteric   Nose/Mouth/Throat: OP clear, buccal mucosa moist, no ulcerations   Lungs: CTA bilaterally  Cardiovascular: RRR, no M/R/G   Abdominal/Rectal: +BS, soft,   Lymphatics: No edema  Skin: No rashes  Neuro: A&O   Additional Findings: Weldon site NT, no drainage.     Lab Results   Component Value Date    WBC 1.9 (L) 11/17/2021    ANEU 0.8 (L) 11/16/2021    HGB 11.2 (L) 11/17/2021    HCT 33.8 (L) 11/17/2021     (L) 11/17/2021     (L) 11/16/2021    POTASSIUM 4.1 11/16/2021    CHLORIDE 102 11/16/2021    CO2 21 11/16/2021     (H) 11/16/2021    BUN 12 11/16/2021    CR 0.81 11/16/2021    MAG 2.3 11/15/2021    INR 1.06 11/14/2021    BILITOTAL 0.4 11/15/2021    AST 11 11/15/2021    ALT 18 11/15/2021    ALKPHOS 74 11/15/2021    PROTTOTAL 6.5 (L) 11/15/2021    ALBUMIN 3.7 11/15/2021       I have assessed all abnormal lab values for their clinical significance and any values considered clinically significant have been addressed in the assessment  and plan.      A/P:  Jitendrapal KINDRA Ronquillo is a 51 year old male with high risk multiple myeloma, D+6 s/p auto BMT     Prep: melphalan on D-1  Transplant 11/11     1. BMT  - BMT doc/Coordinator: Miguelangel/Du Newsome; Lorelei/Christine  - Cell dose 6.99 x 10^6; gave all cells due to high risk disease  - GCSF started day +5, continue until ANC > 2500 for 2 consecutive days.      2. HEME/COAG  - Transfusion parameters: hgb <7g/dL and plts <10,000  - pancytopenia but not needing transfusions     3. ID. New diarrhea, stool culture kit sent  - No active infections.   Prophy:  Viral: ACV BID (CMV+)  Bacterial: levofloxacin 250mg daily  Fungal: fluc  PCP: Bactrim or other PCP prophy to start at day +28     4. GI/NUTRITION  - s/p antiemetics pre chemo.  prn zofran compazine and ativan. Advised him to alternate zofran/compazine for now.    - loose stools, cdiff neg. 11/16. Imodium prn.   - Ulcer prophy: Protonix 40mg daily.      5. CV  - Hx of CAD, MI at age 28 with stent placed  - HTN: cont losartan. Hold hydrochlorothiazide through transplant  - Hyperlipidemia: hold statin through transplant     6 RENAL/  FEN  - Cr and lytes WNL.   - 1L NS today d/t dizziness. Pt felt much better after IVF yesterday.      7. ENDOCRINE  - hx hypothyroidism  - new diagnosis of papillary thyroid carcinoma per bx result from 11/2, appears that the plan is for thyroidectomy after BMT     8. Neuro  - peripheral neuropathy: started gabapentin 300mg BID, pm dose to 600mg per patient. He will try 600 in AM and 300in PM to see if helps with sleep.   - start B complex vitamin (contains vit C) about 2 weeks post transplant to avoid interaction with chemo     RTC: daily for labs and provider visit. Possible IVF    25 minutes spent on the date of the encounter doing chart review, history and exam, documentation and further activities per the note      Shukri Moffett PA-C  [phone]

## 2021-11-17 NOTE — PROGRESS NOTES
Infusion Nursing Note:  Kurtisdorianmatthias Ronquillo presents today for add-on infusion.    Patient seen by provider today: Yes   present during visit today: Not Applicable.    Note: Patient received 1 L NS bolus per provider's request.      Intravenous Access:  Weldon.    Treatment Conditions:  Results reviewed, labs MET treatment parameters, ok to proceed with treatment.      Post Infusion Assessment:  Patient tolerated infusion without incident.       Discharge Plan:   Patient and/or family verbalized understanding of discharge instructions and all questions answered.      Leslie Carnes RN

## 2021-11-17 NOTE — PROGRESS NOTES
BMT Progress Note     ID: Jitendrmagui Ronquillo is a 50 yo man D+7 s/p auto PBSCT for high risk MM     HPI: Here with his wife. Feels fatigued as he didn't sleep well last night. Nausea managed with alternating Zofran/compazine. No emesis. Stools are less frequent, sometimes watery, sometimes not. No abd pain. Not dizzy. No mouth pain, throat pain, or bleeding. No fevers or chills.      ROS: Negative except as stated above in HPI     Physical Exam  Blood pressure 126/86, pulse 98, temperature 98.1  F (36.7  C), temperature source Oral, resp. rate 16, weight 72.3 kg (159 lb 6.4 oz), SpO2 100 %.    Wt Readings from Last 4 Encounters:   11/18/21 72.3 kg (159 lb 6.4 oz)   11/17/21 72.3 kg (159 lb 4.8 oz)   11/16/21 72.5 kg (159 lb 12.8 oz)   11/15/21 72.7 kg (160 lb 4.8 oz)     General: NAD   Eyes: sclera anicteric   Nose/Mouth/Throat: OP moist, no ulcerations   Lungs: CTA bilaterally  Cardiovascular: RRR, no M/R/G   Abdominal/Rectal: +BS, soft,   Lymphatics: No edema  Skin: No rashes  Neuro: A&O   Access: CVC site NT, no drainage.     Labs:  Lab Results   Component Value Date    WBC 0.1 (LL) 11/18/2021    ANEU 1.7 11/17/2021    HGB 11.0 (L) 11/18/2021    HCT 32.1 (L) 11/18/2021    PLT 70 (L) 11/18/2021     11/17/2021    POTASSIUM 3.9 11/17/2021    CHLORIDE 105 11/17/2021    CO2 22 11/17/2021     (H) 11/17/2021    BUN 11 11/17/2021    CR 0.80 11/17/2021    MAG 2.3 11/15/2021    INR 1.06 11/14/2021    BILITOTAL 0.4 11/15/2021    AST 11 11/15/2021    ALT 18 11/15/2021    ALKPHOS 74 11/15/2021    PROTTOTAL 6.5 (L) 11/15/2021    ALBUMIN 3.7 11/15/2021     Chem machine down    I have assessed all abnormal lab values for their clinical significance and any values considered clinically significant have been addressed in the assessment and plan.      A/P: June Ronquillo is a 50 yo man D+7 s/p auto PBSCT for high risk MM      1. BMT  - BMT doc/Coordinator: Miguelangel/Du Moseley/Christine  - Cell dose 6.99 x 10^6;  gave all cells due to high risk disease  - GCSF started day +5, continue until ANC > 2500 for 2 consecutive days.      2. HEME/COAG  - Transfusion parameters: hgb <7g/dL and plts <10,000  - pancytopenic but not needing transfusions. Now neutropenic.     3. ID. Afebrile. Neutropenic precautions discussed  Prophy:  Fluc, Levo, ACV. Bactrim for PCP prophy to start at day +28     4. GI/NUTRITION  - DAYNA: alternate Zofran, compazine prn.    - loose stools, cdiff neg. 11/16. Imodium prn.   - Ulcer prophy: Protonix 40mg daily.      5. CV  - Hx of CAD, MI at age 28 with stent placed  - HTN: cont losartan. Hold hydrochlorothiazide through transplant  - Hyperlipidemia: hold statin through transplant     6 RENAL/FEN  - s/p IVF the past 2 days; not needed today.    - chem machine down; creat, lytes wnl yesterday     7. ENDOCRINE  - hx hypothyroidism  - new diagnosis of papillary thyroid carcinoma per bx result from 11/2, appears that the plan is for thyroidectomy after BMT     8. Neuro  - peripheral neuropathy: started gabapentin 300mg BID, pm dose to 600mg per patient. He will try 600 in AM and 300in PM to see if helps with sleep.   - start B complex vitamin (contains vit C) about 2 weeks post transplant to avoid interaction with chemo     RTC: daily for labs and provider visit. Possible IVF.  Appts requested thru next Friday    30 minutes spent on the date of the encounter doing chart review, history and exam, documentation and further activities per the note    Sarahi Nj PA-C  [phone]

## 2021-11-17 NOTE — LETTER
11/17/2021         RE: June Ronquillo  [address]  [facility] [address]        Dear Colleague,    Thank you for referring your patient, June Ronquillo, to the [facility] BLOOD AND MARROW TRANSPLANT PROGRAM [place]. Please see a copy of my visit note below.    Infusion Nursing Note:  June Ronquillo presents today for add-on infusion.    Patient seen by provider today: Yes   present during visit today: Not Applicable.    Note: Patient received 1 L NS bolus per provider's request.      Intravenous Access:  Weldon.    Treatment Conditions:  Results reviewed, labs MET treatment parameters, ok to proceed with treatment.      Post Infusion Assessment:  Patient tolerated infusion without incident.       Discharge Plan:   Patient and/or family verbalized understanding of discharge instructions and all questions answered.      Leslie Carnes RN                          Again, thank you for allowing me to participate in the care of your patient.        Sincerely,        [facility]

## 2021-11-17 NOTE — NURSING NOTE
Chief Complaint   Patient presents with     Blood Draw     Labs drawn from CVC in lab by RN. VS taken.      Labs collected from CVC by RN, line flushed with saline and heparin.  Vitals taken. Pt checked in for appointment(s).  Braulio Lindsay RN

## 2021-11-17 NOTE — NURSING NOTE
"Oncology Rooming Note    November 17, 2021 12:23 PM   June Ronquillo is a 51 year old male who presents for:    Chief Complaint   Patient presents with     Blood Draw     Labs drawn from CVC in lab by RN. VS taken.      Oncology Clinic Visit     multiple myeloma     Initial Vitals: /84 (BP Location: Left arm, Patient Position: Sitting, Cuff Size: Adult Regular)   Pulse 99   Temp 97.6  F (36.4  C) (Oral)   Resp 16   Wt 72.3 kg (159 lb 4.8 oz)   SpO2 100%   BMI 26.70 kg/m   Estimated body mass index is 26.7 kg/m  as calculated from the following:    Height as of 11/10/21: 1.645 m (5' 4.76\").    Weight as of this encounter: 72.3 kg (159 lb 4.8 oz). Body surface area is 1.82 meters squared.  No Pain (0) Comment: Data Unavailable   No LMP for male patient.  Allergies reviewed: Yes  Medications reviewed: Yes    Medications: Medication refills not needed today.  Pharmacy name entered into [employer]:    [facility] [place], [place] - [address]  [facility] [place], [place] - [address]    Clinical concerns: none       Kathy Xavier CMA            "

## 2021-11-18 ENCOUNTER — APPOINTMENT (OUTPATIENT)
Dept: LAB | Facility: CLINIC | Age: 51
End: 2021-11-18
Attending: INTERNAL MEDICINE
Payer: COMMERCIAL

## 2021-11-18 ENCOUNTER — ONCOLOGY VISIT (OUTPATIENT)
Dept: TRANSPLANT | Facility: CLINIC | Age: 51
End: 2021-11-18
Attending: INTERNAL MEDICINE
Payer: COMMERCIAL

## 2021-11-18 VITALS
TEMPERATURE: 98.1 F | BODY MASS INDEX: 26.72 KG/M2 | OXYGEN SATURATION: 100 % | HEART RATE: 98 BPM | RESPIRATION RATE: 16 BRPM | DIASTOLIC BLOOD PRESSURE: 86 MMHG | SYSTOLIC BLOOD PRESSURE: 126 MMHG | WEIGHT: 159.4 LBS

## 2021-11-18 DIAGNOSIS — C90.00 MULTIPLE MYELOMA NOT HAVING ACHIEVED REMISSION (H): Primary | ICD-10-CM

## 2021-11-18 DIAGNOSIS — Z52.011 AUTOLOGOUS DONOR, STEM CELLS: ICD-10-CM

## 2021-11-18 PROCEDURE — 250N000011 HC RX IP 250 OP 636: Performed by: PHYSICIAN ASSISTANT

## 2021-11-18 PROCEDURE — 96372 THER/PROPH/DIAG INJ SC/IM: CPT | Performed by: PHYSICIAN ASSISTANT

## 2021-11-18 PROCEDURE — 99214 OFFICE O/P EST MOD 30 MIN: CPT

## 2021-11-18 PROCEDURE — G0463 HOSPITAL OUTPT CLINIC VISIT: HCPCS

## 2021-11-18 PROCEDURE — 250N000011 HC RX IP 250 OP 636: Performed by: INTERNAL MEDICINE

## 2021-11-18 RX ORDER — HEPARIN SODIUM (PORCINE) LOCK FLUSH IV SOLN 100 UNIT/ML 100 UNIT/ML
5 SOLUTION INTRAVENOUS
Status: CANCELLED | OUTPATIENT
Start: 2021-11-19

## 2021-11-18 RX ORDER — HEPARIN SODIUM,PORCINE 10 UNIT/ML
5 VIAL (ML) INTRAVENOUS ONCE
Status: COMPLETED | OUTPATIENT
Start: 2021-11-18 | End: 2021-11-18

## 2021-11-18 RX ORDER — HEPARIN SODIUM,PORCINE 10 UNIT/ML
5 VIAL (ML) INTRAVENOUS
Status: CANCELLED | OUTPATIENT
Start: 2021-11-19

## 2021-11-18 RX ADMIN — FILGRASTIM 480 MCG: 480 INJECTION, SOLUTION INTRAVENOUS; SUBCUTANEOUS at 14:26

## 2021-11-18 RX ADMIN — Medication 5 ML: at 13:51

## 2021-11-18 ASSESSMENT — PAIN SCALES - GENERAL: PAINLEVEL: MILD PAIN (3)

## 2021-11-18 NOTE — LETTER
11/18/2021         RE: June Ronquillo  [address]  [facility] [address]        Dear Colleague,    Thank you for referring your patient, June Ronquillo, to the [facility] BLOOD AND MARROW TRANSPLANT PROGRAM [place]. Please see a copy of my visit note below.    BMT Progress Note     ID: June Ronquillo is a 52 yo man D+7 s/p auto PBSCT for high risk MM     HPI: Here with his wife. Feels fatigued as he didn't sleep well last night. Nausea managed with alternating Zofran/compazine. No emesis. Stools are less frequent, sometimes watery, sometimes not. No abd pain. Not dizzy. No mouth pain, throat pain, or bleeding. No fevers or chills.      ROS: Negative except as stated above in HPI     Physical Exam  Blood pressure 126/86, pulse 98, temperature 98.1  F (36.7  C), temperature source Oral, resp. rate 16, weight 72.3 kg (159 lb 6.4 oz), SpO2 100 %.    Wt Readings from Last 4 Encounters:   11/18/21 72.3 kg (159 lb 6.4 oz)   11/17/21 72.3 kg (159 lb 4.8 oz)   11/16/21 72.5 kg (159 lb 12.8 oz)   11/15/21 72.7 kg (160 lb 4.8 oz)     General: NAD   Eyes: sclera anicteric   Nose/Mouth/Throat: OP moist, no ulcerations   Lungs: CTA bilaterally  Cardiovascular: RRR, no M/R/G   Abdominal/Rectal: +BS, soft,   Lymphatics: No edema  Skin: No rashes  Neuro: A&O   Access: CVC site NT, no drainage.     Labs:  Lab Results   Component Value Date    WBC 0.1 (LL) 11/18/2021    ANEU 1.7 11/17/2021    HGB 11.0 (L) 11/18/2021    HCT 32.1 (L) 11/18/2021    PLT 70 (L) 11/18/2021     11/17/2021    POTASSIUM 3.9 11/17/2021    CHLORIDE 105 11/17/2021    CO2 22 11/17/2021     (H) 11/17/2021    BUN 11 11/17/2021    CR 0.80 11/17/2021    MAG 2.3 11/15/2021    INR 1.06 11/14/2021    BILITOTAL 0.4 11/15/2021    AST 11 11/15/2021    ALT 18 11/15/2021    ALKPHOS 74 11/15/2021    PROTTOTAL 6.5 (L) 11/15/2021    ALBUMIN 3.7 11/15/2021     Chem machine down    I have assessed all abnormal lab values for  their clinical significance and any values considered clinically significant have been addressed in the assessment and plan.      A/P: June Ronquillo is a 52 yo man D+7 s/p auto PBSCT for high risk MM      1. BMT  - BMT doc/Coordinator: Miguelangel/Du Newsome; Lorelei/Christine  - Cell dose 6.99 x 10^6; gave all cells due to high risk disease  - GCSF started day +5, continue until ANC > 2500 for 2 consecutive days.      2. HEME/COAG  - Transfusion parameters: hgb <7g/dL and plts <10,000  - pancytopenic but not needing transfusions. Now neutropenic.     3. ID. Afebrile. Neutropenic precautions discussed  Prophy:  Fluc, Levo, ACV. Bactrim for PCP prophy to start at day +28     4. GI/NUTRITION  - DAYNA: alternate Zofran, compazine prn.    - loose stools, cdiff neg. 11/16. Imodium prn.   - Ulcer prophy: Protonix 40mg daily.      5. CV  - Hx of CAD, MI at age 28 with stent placed  - HTN: cont losartan. Hold hydrochlorothiazide through transplant  - Hyperlipidemia: hold statin through transplant     6 RENAL/FEN  - s/p IVF the past 2 days; not needed today.    - chem machine down; creat, lytes wnl yesterday     7. ENDOCRINE  - hx hypothyroidism  - new diagnosis of papillary thyroid carcinoma per bx result from 11/2, appears that the plan is for thyroidectomy after BMT     8. Neuro  - peripheral neuropathy: started gabapentin 300mg BID, pm dose to 600mg per patient. He will try 600 in AM and 300in PM to see if helps with sleep.   - start B complex vitamin (contains vit C) about 2 weeks post transplant to avoid interaction with chemo     RTC: daily for labs and provider visit. Possible IVF.  Appts requested thru next Friday    30 minutes spent on the date of the encounter doing chart review, history and exam, documentation and further activities per the note    Sarahi Nj PA-C  [phone]

## 2021-11-18 NOTE — NURSING NOTE
"Oncology Rooming Note    November 18, 2021 1:59 PM   June Ronquillo is a 51 year old male who presents for:    Chief Complaint   Patient presents with     Blood Draw     Labs drawn from CVC in lab by RN. VS taken.      Oncology Clinic Visit     Multiple myeloma not having achieved remission (H)     Initial Vitals: /86 (BP Location: Left arm, Patient Position: Sitting, Cuff Size: Adult Regular)   Pulse 98   Temp 98.1  F (36.7  C) (Oral)   Resp 16   Wt 72.3 kg (159 lb 6.4 oz)   SpO2 100%   BMI 26.72 kg/m   Estimated body mass index is 26.72 kg/m  as calculated from the following:    Height as of 11/10/21: 1.645 m (5' 4.76\").    Weight as of this encounter: 72.3 kg (159 lb 6.4 oz). Body surface area is 1.82 meters squared.  Mild Pain (3) Comment: Data Unavailable   No LMP for male patient.  Allergies reviewed: Yes  Medications reviewed: Yes    Medications: Medication refills not needed today.  Pharmacy name entered into [facility]:    [facility] [place], [place] - [address] [facility] [place], [place] - [address]    Clinical concerns: No major changes reported today.        Jillian Gomez LPN November 18, 2021 2:00 PM                "

## 2021-11-19 ENCOUNTER — INFUSION THERAPY VISIT (OUTPATIENT)
Dept: TRANSPLANT | Facility: CLINIC | Age: 51
End: 2021-11-19
Attending: PHYSICIAN ASSISTANT
Payer: COMMERCIAL

## 2021-11-19 ENCOUNTER — ONCOLOGY VISIT (OUTPATIENT)
Dept: TRANSPLANT | Facility: CLINIC | Age: 51
End: 2021-11-19
Attending: INTERNAL MEDICINE
Payer: COMMERCIAL

## 2021-11-19 ENCOUNTER — APPOINTMENT (OUTPATIENT)
Dept: LAB | Facility: CLINIC | Age: 51
End: 2021-11-19
Attending: INTERNAL MEDICINE
Payer: COMMERCIAL

## 2021-11-19 VITALS
RESPIRATION RATE: 18 BRPM | OXYGEN SATURATION: 100 % | BODY MASS INDEX: 26.65 KG/M2 | WEIGHT: 159 LBS | DIASTOLIC BLOOD PRESSURE: 78 MMHG | TEMPERATURE: 97.9 F | SYSTOLIC BLOOD PRESSURE: 119 MMHG | HEART RATE: 104 BPM

## 2021-11-19 DIAGNOSIS — C90.00 MULTIPLE MYELOMA NOT HAVING ACHIEVED REMISSION (H): ICD-10-CM

## 2021-11-19 DIAGNOSIS — Z52.011 AUTOLOGOUS DONOR, STEM CELLS: ICD-10-CM

## 2021-11-19 DIAGNOSIS — C90.00 MULTIPLE MYELOMA NOT HAVING ACHIEVED REMISSION (H): Primary | ICD-10-CM

## 2021-11-19 LAB
ANION GAP SERPL CALCULATED.3IONS-SCNC: 10 MMOL/L (ref 3–14)
BUN SERPL-MCNC: 10 MG/DL (ref 7–30)
CALCIUM SERPL-MCNC: 7.5 MG/DL (ref 8.5–10.1)
CHLORIDE BLD-SCNC: 100 MMOL/L (ref 94–109)
CO2 SERPL-SCNC: 22 MMOL/L (ref 20–32)
CREAT SERPL-MCNC: 0.68 MG/DL (ref 0.66–1.25)
DACRYOCYTES BLD QL SMEAR: SLIGHT
ERYTHROCYTE [DISTWIDTH] IN BLOOD BY AUTOMATED COUNT: 13.9 % (ref 10–15)
GFR SERPL CREATININE-BSD FRML MDRD: >90 ML/MIN/1.73M2
GLUCOSE BLD-MCNC: 136 MG/DL (ref 70–99)
HCT VFR BLD AUTO: 32.7 % (ref 40–53)
HGB BLD-MCNC: 11.1 G/DL (ref 13.3–17.7)
MCH RBC QN AUTO: 30.2 PG (ref 26.5–33)
MCHC RBC AUTO-ENTMCNC: 33.9 G/DL (ref 31.5–36.5)
MCV RBC AUTO: 89 FL (ref 78–100)
PLAT MORPH BLD: ABNORMAL
PLATELET # BLD AUTO: 31 10E3/UL (ref 150–450)
POTASSIUM BLD-SCNC: 3.4 MMOL/L (ref 3.4–5.3)
RBC # BLD AUTO: 3.68 10E6/UL (ref 4.4–5.9)
RBC MORPH BLD: ABNORMAL
SODIUM SERPL-SCNC: 132 MMOL/L (ref 133–144)
WBC # BLD AUTO: 0.1 10E3/UL (ref 4–11)

## 2021-11-19 PROCEDURE — 96360 HYDRATION IV INFUSION INIT: CPT

## 2021-11-19 PROCEDURE — 250N000011 HC RX IP 250 OP 636: Performed by: PHYSICIAN ASSISTANT

## 2021-11-19 PROCEDURE — 250N000011 HC RX IP 250 OP 636: Performed by: INTERNAL MEDICINE

## 2021-11-19 PROCEDURE — 85027 COMPLETE CBC AUTOMATED: CPT

## 2021-11-19 PROCEDURE — G0463 HOSPITAL OUTPT CLINIC VISIT: HCPCS | Mod: 25

## 2021-11-19 PROCEDURE — 36592 COLLECT BLOOD FROM PICC: CPT

## 2021-11-19 PROCEDURE — 80048 BASIC METABOLIC PNL TOTAL CA: CPT

## 2021-11-19 PROCEDURE — 96372 THER/PROPH/DIAG INJ SC/IM: CPT | Mod: XS | Performed by: PHYSICIAN ASSISTANT

## 2021-11-19 PROCEDURE — 99213 OFFICE O/P EST LOW 20 MIN: CPT

## 2021-11-19 PROCEDURE — 258N000003 HC RX IP 258 OP 636: Performed by: PHYSICIAN ASSISTANT

## 2021-11-19 RX ORDER — HEPARIN SODIUM,PORCINE 10 UNIT/ML
5 VIAL (ML) INTRAVENOUS
Status: DISCONTINUED | OUTPATIENT
Start: 2021-11-19 | End: 2021-11-19 | Stop reason: HOSPADM

## 2021-11-19 RX ORDER — HEPARIN SODIUM,PORCINE 10 UNIT/ML
5 VIAL (ML) INTRAVENOUS
Status: CANCELLED | OUTPATIENT
Start: 2021-11-20

## 2021-11-19 RX ORDER — GABAPENTIN 300 MG/1
300 CAPSULE ORAL 2 TIMES DAILY
Qty: 90 CAPSULE | Refills: 1 | COMMUNITY
Start: 2021-11-19 | End: 2021-12-09

## 2021-11-19 RX ORDER — HEPARIN SODIUM (PORCINE) LOCK FLUSH IV SOLN 100 UNIT/ML 100 UNIT/ML
5 SOLUTION INTRAVENOUS
Status: CANCELLED | OUTPATIENT
Start: 2021-11-20

## 2021-11-19 RX ADMIN — FILGRASTIM 480 MCG: 480 INJECTION, SOLUTION INTRAVENOUS; SUBCUTANEOUS at 15:22

## 2021-11-19 RX ADMIN — Medication 5 ML: at 14:14

## 2021-11-19 RX ADMIN — Medication 5 ML: at 15:26

## 2021-11-19 RX ADMIN — SODIUM CHLORIDE 1000 ML: 9 INJECTION, SOLUTION INTRAVENOUS at 14:30

## 2021-11-19 RX ADMIN — Medication 5 ML: at 14:13

## 2021-11-19 ASSESSMENT — PAIN SCALES - GENERAL: PAINLEVEL: SEVERE PAIN (6)

## 2021-11-19 NOTE — PROGRESS NOTES
BMT Progress Note     ID: Jitendrmagui Ronquillo is a 50 yo man D+8 s/p auto PBSCT for high risk MM     HPI: Fatigued.  Nauseated.  No vomiting.  Sense of knot in stomach.  Some diarrhea, but improving.  IV fluid has helped him a lot.  No fevers.  No chest pain/cough.  No swelling. Not bleeding.  No mouth sores, but throat slightly tender.  Not really eating.      ROS: Negative except as stated above in HPI     Physical Exam  Blood pressure 119/78, pulse 104, temperature 97.9  F (36.6  C), resp. rate 18, weight 72.1 kg (159 lb), SpO2 100 %.    Wt Readings from Last 4 Encounters:   11/19/21 72.1 kg (159 lb)   11/18/21 72.3 kg (159 lb 6.4 oz)   11/17/21 72.3 kg (159 lb 4.8 oz)   11/16/21 72.5 kg (159 lb 12.8 oz)     General: NAD   Eyes: sclera anicteric   Nose/Mouth/Throat: OP moist, no ulcerations   Lungs: CTA bilaterally  Cardiovascular: RRR, no M/R/G   Abdominal/Rectal: +BS, soft,   Lymphatics: No edema  Skin: No rashes  Neuro: A&O   Access: CVC site NT, no drainage.     Labs:  Lab Results   Component Value Date    WBC 0.1 (LL) 11/19/2021    ANEU 1.7 11/17/2021    HGB 11.1 (L) 11/19/2021    HCT 32.7 (L) 11/19/2021    PLT 31 (LL) 11/19/2021     (L) 11/19/2021    POTASSIUM 3.4 11/19/2021    CHLORIDE 100 11/19/2021    CO2 22 11/19/2021     (H) 11/19/2021    BUN 10 11/19/2021    CR 0.68 11/19/2021    MAG 2.1 11/18/2021    INR 1.06 11/14/2021    BILITOTAL 0.4 11/15/2021    AST 11 11/15/2021    ALT 18 11/15/2021    ALKPHOS 74 11/15/2021    PROTTOTAL 6.5 (L) 11/15/2021    ALBUMIN 3.7 11/15/2021       I have assessed all abnormal lab values for their clinical significance and any values considered clinically significant have been addressed in the assessment and plan.      A/P: June Ronquillo is a 50 yo man D+8 s/p auto PBSCT for high risk MM      1. BMT  - BMT doc/Coordinator: Miguelangel/Du Moseley/Christine  - Cell dose 6.99 x 10^6; gave all cells due to high risk disease  - GCSF started day +5, continue  until ANC > 2500 for 2 consecutive days.      2. HEME/COAG  - Transfusion parameters: hgb <7g/dL and plts <10,000; anticipate platelets this weekend.   - pancytopenic but not needing transfusions. Now neutropenic.     3. ID. Afebrile. Neutropenic precautions discussed  Prophy:  Fluc, Levo, ACV. Bactrim for PCP prophy to start at day +28     4. GI/NUTRITION  - DAYNA: alternate Zofran, compazine prn.    - loose stools, cdiff neg. 11/16. Imodium prn.   - Ulcer prophy: Protonix 40mg daily.      5. CV  - Hx of CAD, MI at age 28 with stent placed  - HTN: cont losartan. Hold hydrochlorothiazide through transplant  - Hyperlipidemia: hold statin through transplant     6 RENAL/FEN  - IVF today     7. ENDOCRINE  - hx hypothyroidism  - new diagnosis of papillary thyroid carcinoma per bx result from 11/2, appears that the plan is for thyroidectomy after BMT     8. Neuro  - peripheral neuropathy: gabapentin 300mg BID.  His feet felt worse on higher dose, he reports.   - start B complex vitamin (contains vit C) about 2 weeks post transplant to avoid interaction with chemo     RTC: daily for labs and provider visit. Possible IVF.  Possible plts.    I spent 20 minutes in the care of this patient today, which included time necessary for preparation for the visit, obtaining history, ordering medications/tests/procedures as medically indicated, review of pertinent medical literature, counseling of the patient, communication of recommendations to the care team, and documentation time.    Sneha Vance PA-C  11/19/2021

## 2021-11-19 NOTE — NURSING NOTE
"Oncology Rooming Note    November 19, 2021 3:34 PM   June Ronquillo is a 51 year old male who presents for:    Chief Complaint   Patient presents with     Labs Only     cvc, heparin locked, vitals checked     Initial Vitals: /78   Pulse 104   Temp 97.9  F (36.6  C)   Resp 18   Wt 72.1 kg (159 lb)   SpO2 100%   BMI 26.65 kg/m   Estimated body mass index is 26.65 kg/m  as calculated from the following:    Height as of 11/10/21: 1.645 m (5' 4.76\").    Weight as of this encounter: 72.1 kg (159 lb). Body surface area is 1.82 meters squared.  Severe Pain (6) Comment: Data Unavailable   No LMP for male patient.  Allergies reviewed: Yes  Medications reviewed: Yes    Medications: Medication refills not needed today.  Pharmacy name entered into [employer]:    [facility] [place], [place] - [address] [place], [place] - [address] [place] [address]    Clinical concerns: see infusion note       Keily Vicente RN              "

## 2021-11-19 NOTE — LETTER
11/19/2021         RE: June Ronquillo  [address]  [facility] [address]        Dear Colleague,    Thank you for referring your patient, June Ronquillo, to the [facility] BLOOD AND MARROW TRANSPLANT PROGRAM [place]. Please see a copy of my visit note below.    BMT Progress Note     ID: June Ronquillo is a 50 yo man D+8 s/p auto PBSCT for high risk MM     HPI: Fatigued.  Nauseated.  No vomiting.  Sense of knot in stomach.  Some diarrhea, but improving.  IV fluid has helped him a lot.  No fevers.  No chest pain/cough.  No swelling. Not bleeding.  No mouth sores, but throat slightly tender.  Not really eating.      ROS: Negative except as stated above in HPI     Physical Exam  Blood pressure 119/78, pulse 104, temperature 97.9  F (36.6  C), resp. rate 18, weight 72.1 kg (159 lb), SpO2 100 %.    Wt Readings from Last 4 Encounters:   11/19/21 72.1 kg (159 lb)   11/18/21 72.3 kg (159 lb 6.4 oz)   11/17/21 72.3 kg (159 lb 4.8 oz)   11/16/21 72.5 kg (159 lb 12.8 oz)     General: NAD   Eyes: sclera anicteric   Nose/Mouth/Throat: OP moist, no ulcerations   Lungs: CTA bilaterally  Cardiovascular: RRR, no M/R/G   Abdominal/Rectal: +BS, soft,   Lymphatics: No edema  Skin: No rashes  Neuro: A&O   Access: CVC site NT, no drainage.     Labs:  Lab Results   Component Value Date    WBC 0.1 (LL) 11/19/2021    ANEU 1.7 11/17/2021    HGB 11.1 (L) 11/19/2021    HCT 32.7 (L) 11/19/2021    PLT 31 (LL) 11/19/2021     (L) 11/19/2021    POTASSIUM 3.4 11/19/2021    CHLORIDE 100 11/19/2021    CO2 22 11/19/2021     (H) 11/19/2021    BUN 10 11/19/2021    CR 0.68 11/19/2021    MAG 2.1 11/18/2021    INR 1.06 11/14/2021    BILITOTAL 0.4 11/15/2021    AST 11 11/15/2021    ALT 18 11/15/2021    ALKPHOS 74 11/15/2021    PROTTOTAL 6.5 (L) 11/15/2021    ALBUMIN 3.7 11/15/2021       I have assessed all abnormal lab values for their clinical significance and any values considered clinically significant  have been addressed in the assessment and plan.      A/P: June Ronquillo is a 52 yo man D+8 s/p auto PBSCT for high risk MM      1. BMT  - BMT doc/Coordinator: Miguelangel/Du Newsome; Lorelei/Christine  - Cell dose 6.99 x 10^6; gave all cells due to high risk disease  - GCSF started day +5, continue until ANC > 2500 for 2 consecutive days.      2. HEME/COAG  - Transfusion parameters: hgb <7g/dL and plts <10,000; anticipate platelets this weekend.   - pancytopenic but not needing transfusions. Now neutropenic.     3. ID. Afebrile. Neutropenic precautions discussed  Prophy:  Fluc, Levo, ACV. Bactrim for PCP prophy to start at day +28     4. GI/NUTRITION  - DAYNA: alternate Zofran, compazine prn.    - loose stools, cdiff neg. 11/16. Imodium prn.   - Ulcer prophy: Protonix 40mg daily.      5. CV  - Hx of CAD, MI at age 28 with stent placed  - HTN: cont losartan. Hold hydrochlorothiazide through transplant  - Hyperlipidemia: hold statin through transplant     6 RENAL/FEN  - IVF today     7. ENDOCRINE  - hx hypothyroidism  - new diagnosis of papillary thyroid carcinoma per bx result from 11/2, appears that the plan is for thyroidectomy after BMT     8. Neuro  - peripheral neuropathy: gabapentin 300mg BID.  His feet felt worse on higher dose, he reports.   - start B complex vitamin (contains vit C) about 2 weeks post transplant to avoid interaction with chemo     RTC: daily for labs and provider visit. Possible IVF.  Possible plts.    I spent 20 minutes in the care of this patient today, which included time necessary for preparation for the visit, obtaining history, ordering medications/tests/procedures as medically indicated, review of pertinent medical literature, counseling of the patient, communication of recommendations to the care team, and documentation time.    Sneha Vance PA-C  11/19/2021

## 2021-11-19 NOTE — NURSING NOTE
"Oncology Rooming Note    November 19, 2021 2:29 PM   June Ronquillo is a 51 year old male who presents for:    Chief Complaint   Patient presents with     Infusion     possible IV fluids, electrolytes.  hx multiple myeloma.     Initial Vitals: There were no vitals taken for this visit. Estimated body mass index is 26.65 kg/m  as calculated from the following:    Height as of 11/10/21: 1.645 m (5' 4.76\").    Weight as of an earlier encounter on 11/19/21: 72.1 kg (159 lb). There is no height or weight on file to calculate BSA.  Data Unavailable Comment: Data Unavailable   No LMP for male patient.  Allergies reviewed: Yes  Medications reviewed: Yes    Medications: Medication refills not needed today.  Pharmacy name entered into [employer]:    [facility] [place], [place] - [address] [facility] [place], [place] - [address]    Clinical concerns: reports feeling weak today, reports decreased po intake and decreased appetite; trying to push fluids at home.       Keily Vicente RN              "

## 2021-11-19 NOTE — NURSING NOTE
Chief Complaint   Patient presents with     Labs Only     cvc, heparin locked, vitals checked     Pt is weak and wobbly today    Alyce Gibbs RN on 11/19/2021 at 2:22 PM

## 2021-11-19 NOTE — LETTER
11/19/2021         RE: June Ronquillo  [address]  [facility] [address]        Dear Colleague,    Thank you for referring your patient, June Ronquillo, to the [facility] BLOOD AND MARROW TRANSPLANT PROGRAM [place]. Please see a copy of my visit note below.    Infusion Nursing Note:  June Ronquillo presents today for possible IV fluids and electrolyte replacement.    Patient seen by provider today: Yes: Shukri Moffett DOROTA   present during visit today: Not Applicable.    Note:   Pt received a liter of 0.9NS over an hour.  Pt received 480 mcg filgrastim by subcutaneous route to left arm      Intravenous Access:  Weldon.    Treatment Conditions:  Lab Results   Component Value Date    HGB 11.1 (L) 11/19/2021    WBC 0.1 (LL) 11/19/2021    ANEU 1.7 11/17/2021    ANEUTAUTO 2.2 11/15/2021    PLT 31 (LL) 11/19/2021      Lab Results   Component Value Date     (L) 11/19/2021    POTASSIUM 3.4 11/19/2021    MAG 2.1 11/18/2021    CR 0.68 11/19/2021    HARDEEP 7.5 (L) 11/19/2021    BILITOTAL 0.4 11/15/2021    ALBUMIN 3.7 11/15/2021    ALT 18 11/15/2021    AST 11 11/15/2021         Post Infusion Assessment:  Patient tolerated infusion without incident.  Patient tolerated injection without incident.  Blood return noted pre and post infusion.  No evidence of extravasations.  Access discontinued per protocol.       Discharge Plan:   Discharge instructions reviewed with: Patient and Family.  Patient discharged in stable condition accompanied by: self.  Departure Mode: Ambulatory.      Keily Vicente RN                          Again, thank you for allowing me to participate in the care of your patient.        Sincerely,        [facility]

## 2021-11-19 NOTE — PROGRESS NOTES
Infusion Nursing Note:  June Ronquillo presents today for possible IV fluids and electrolyte replacement.    Patient seen by provider today: Yes: Shukri Moffett DOROTA   present during visit today: Not Applicable.    Note:   Pt received a liter of 0.9NS over an hour.  Pt received 480 mcg filgrastim by subcutaneous route to left arm      Intravenous Access:  Weldon.    Treatment Conditions:  Lab Results   Component Value Date    HGB 11.1 (L) 11/19/2021    WBC 0.1 (LL) 11/19/2021    ANEU 1.7 11/17/2021    ANEUTAUTO 2.2 11/15/2021    PLT 31 (LL) 11/19/2021      Lab Results   Component Value Date     (L) 11/19/2021    POTASSIUM 3.4 11/19/2021    MAG 2.1 11/18/2021    CR 0.68 11/19/2021    HARDEEP 7.5 (L) 11/19/2021    BILITOTAL 0.4 11/15/2021    ALBUMIN 3.7 11/15/2021    ALT 18 11/15/2021    AST 11 11/15/2021         Post Infusion Assessment:  Patient tolerated infusion without incident.  Patient tolerated injection without incident.  Blood return noted pre and post infusion.  No evidence of extravasations.  Access discontinued per protocol.       Discharge Plan:   Discharge instructions reviewed with: Patient and Family.  Patient discharged in stable condition accompanied by: self.  Departure Mode: Ambulatory.      Keily Vicente RN

## 2021-11-20 ENCOUNTER — HOSPITAL ENCOUNTER (INPATIENT)
Facility: CLINIC | Age: 51
LOS: 3 days | Discharge: HOME | DRG: 809 | End: 2021-11-23
Attending: EMERGENCY MEDICINE | Admitting: INTERNAL MEDICINE
Payer: COMMERCIAL

## 2021-11-20 ENCOUNTER — TELEPHONE (OUTPATIENT)
Dept: ONCOLOGY | Facility: CLINIC | Age: 51
End: 2021-11-20
Payer: COMMERCIAL

## 2021-11-20 ENCOUNTER — APPOINTMENT (OUTPATIENT)
Dept: GENERAL RADIOLOGY | Facility: CLINIC | Age: 51
DRG: 809 | End: 2021-11-20
Attending: EMERGENCY MEDICINE
Payer: COMMERCIAL

## 2021-11-20 ENCOUNTER — APPOINTMENT (OUTPATIENT)
Dept: CT IMAGING | Facility: CLINIC | Age: 51
DRG: 809 | End: 2021-11-20
Attending: EMERGENCY MEDICINE
Payer: COMMERCIAL

## 2021-11-20 DIAGNOSIS — Z20.822 LAB TEST NEGATIVE FOR COVID-19 VIRUS: ICD-10-CM

## 2021-11-20 DIAGNOSIS — D70.9 FEBRILE NEUTROPENIA (H): ICD-10-CM

## 2021-11-20 DIAGNOSIS — E06.3 HYPOTHYROIDISM DUE TO HASHIMOTO'S THYROIDITIS: ICD-10-CM

## 2021-11-20 DIAGNOSIS — R50.81 NEUTROPENIC FEVER (H): ICD-10-CM

## 2021-11-20 DIAGNOSIS — R50.81 FEBRILE NEUTROPENIA (H): ICD-10-CM

## 2021-11-20 DIAGNOSIS — D70.9 NEUTROPENIC FEVER (H): ICD-10-CM

## 2021-11-20 DIAGNOSIS — Z52.011 AUTOLOGOUS DONOR, STEM CELLS: ICD-10-CM

## 2021-11-20 DIAGNOSIS — C90.00 MULTIPLE MYELOMA NOT HAVING ACHIEVED REMISSION (H): Primary | ICD-10-CM

## 2021-11-20 LAB
ALBUMIN SERPL-MCNC: 2.8 G/DL (ref 3.4–5)
ALBUMIN UR-MCNC: NEGATIVE MG/DL
ALP SERPL-CCNC: 53 U/L (ref 40–150)
ALT SERPL W P-5'-P-CCNC: 9 U/L (ref 0–70)
ANION GAP SERPL CALCULATED.3IONS-SCNC: 8 MMOL/L (ref 3–14)
APPEARANCE UR: CLEAR
AST SERPL W P-5'-P-CCNC: 8 U/L (ref 0–45)
BILIRUB SERPL-MCNC: 0.4 MG/DL (ref 0.2–1.3)
BILIRUB UR QL STRIP: NEGATIVE
BUN SERPL-MCNC: 6 MG/DL (ref 7–30)
C DIFF TOX B STL QL: NEGATIVE
C PNEUM DNA SPEC QL NAA+PROBE: NOT DETECTED
CALCIUM SERPL-MCNC: 7.6 MG/DL (ref 8.5–10.1)
CHLORIDE BLD-SCNC: 101 MMOL/L (ref 94–109)
CO2 SERPL-SCNC: 23 MMOL/L (ref 20–32)
COLOR UR AUTO: NORMAL
CREAT SERPL-MCNC: 0.68 MG/DL (ref 0.66–1.25)
DEPRECATED S PYO AG THROAT QL EIA: NEGATIVE
ERYTHROCYTE [DISTWIDTH] IN BLOOD BY AUTOMATED COUNT: 14 % (ref 10–15)
ERYTHROCYTE [DISTWIDTH] IN BLOOD BY AUTOMATED COUNT: 14.1 % (ref 10–15)
FLUAV H1 2009 PAND RNA SPEC QL NAA+PROBE: NOT DETECTED
FLUAV H1 RNA SPEC QL NAA+PROBE: NOT DETECTED
FLUAV H3 RNA SPEC QL NAA+PROBE: NOT DETECTED
FLUAV RNA SPEC QL NAA+PROBE: NEGATIVE
FLUAV RNA SPEC QL NAA+PROBE: NOT DETECTED
FLUBV RNA RESP QL NAA+PROBE: NEGATIVE
FLUBV RNA SPEC QL NAA+PROBE: NOT DETECTED
GFR SERPL CREATININE-BSD FRML MDRD: >90 ML/MIN/1.73M2
GLUCOSE BLD-MCNC: 124 MG/DL (ref 70–99)
GLUCOSE UR STRIP-MCNC: NEGATIVE MG/DL
GROUP A STREP BY PCR: NOT DETECTED
HADV DNA SPEC QL NAA+PROBE: NOT DETECTED
HCOV PNL SPEC NAA+PROBE: NOT DETECTED
HCT VFR BLD AUTO: 25.5 % (ref 40–53)
HCT VFR BLD AUTO: 28.3 % (ref 40–53)
HGB BLD-MCNC: 8.8 G/DL (ref 13.3–17.7)
HGB BLD-MCNC: 9.8 G/DL (ref 13.3–17.7)
HGB UR QL STRIP: NEGATIVE
HMPV RNA SPEC QL NAA+PROBE: NOT DETECTED
HPIV1 RNA SPEC QL NAA+PROBE: NOT DETECTED
HPIV2 RNA SPEC QL NAA+PROBE: NOT DETECTED
HPIV3 RNA SPEC QL NAA+PROBE: NOT DETECTED
HPIV4 RNA SPEC QL NAA+PROBE: NOT DETECTED
KETONES UR STRIP-MCNC: NEGATIVE MG/DL
LACTATE SERPL-SCNC: 0.8 MMOL/L (ref 0.7–2)
LACTATE SERPL-SCNC: 1.6 MMOL/L (ref 0.7–2)
LEUKOCYTE ESTERASE UR QL STRIP: NEGATIVE
M PNEUMO DNA SPEC QL NAA+PROBE: NOT DETECTED
MAGNESIUM SERPL-MCNC: 2 MG/DL (ref 1.6–2.3)
MCH RBC QN AUTO: 30.7 PG (ref 26.5–33)
MCH RBC QN AUTO: 30.8 PG (ref 26.5–33)
MCHC RBC AUTO-ENTMCNC: 34.5 G/DL (ref 31.5–36.5)
MCHC RBC AUTO-ENTMCNC: 34.6 G/DL (ref 31.5–36.5)
MCV RBC AUTO: 89 FL (ref 78–100)
MCV RBC AUTO: 89 FL (ref 78–100)
NITRATE UR QL: NEGATIVE
PH UR STRIP: 7 [PH] (ref 5–7)
PHOSPHATE SERPL-MCNC: 1.4 MG/DL (ref 2.5–4.5)
PLAT MORPH BLD: NORMAL
PLAT MORPH BLD: NORMAL
PLATELET # BLD AUTO: 11 10E3/UL (ref 150–450)
PLATELET # BLD AUTO: 5 10E3/UL (ref 150–450)
POTASSIUM BLD-SCNC: 3.2 MMOL/L (ref 3.4–5.3)
POTASSIUM BLD-SCNC: 3.3 MMOL/L (ref 3.4–5.3)
PROT SERPL-MCNC: 5.8 G/DL (ref 6.8–8.8)
RBC # BLD AUTO: 2.86 10E6/UL (ref 4.4–5.9)
RBC # BLD AUTO: 3.19 10E6/UL (ref 4.4–5.9)
RBC MORPH BLD: NORMAL
RBC MORPH BLD: NORMAL
RBC URINE: 1 /HPF
RSV RNA SPEC NAA+PROBE: NEGATIVE
RSV RNA SPEC QL NAA+PROBE: NOT DETECTED
RSV RNA SPEC QL NAA+PROBE: NOT DETECTED
RV+EV RNA SPEC QL NAA+PROBE: NOT DETECTED
SARS-COV-2 RNA RESP QL NAA+PROBE: NEGATIVE
SODIUM SERPL-SCNC: 132 MMOL/L (ref 133–144)
SP GR UR STRIP: 1.01 (ref 1–1.03)
UROBILINOGEN UR STRIP-MCNC: NORMAL MG/DL
WBC # BLD AUTO: 0.1 10E3/UL (ref 4–11)
WBC # BLD AUTO: 0.1 10E3/UL (ref 4–11)
WBC URINE: 1 /HPF

## 2021-11-20 PROCEDURE — 74177 CT ABD & PELVIS W/CONTRAST: CPT

## 2021-11-20 PROCEDURE — 83605 ASSAY OF LACTIC ACID: CPT | Performed by: INTERNAL MEDICINE

## 2021-11-20 PROCEDURE — 87081 CULTURE SCREEN ONLY: CPT | Performed by: INTERNAL MEDICINE

## 2021-11-20 PROCEDURE — 87506 IADNA-DNA/RNA PROBE TQ 6-11: CPT | Performed by: INTERNAL MEDICINE

## 2021-11-20 PROCEDURE — 74177 CT ABD & PELVIS W/CONTRAST: CPT | Mod: 26 | Performed by: RADIOLOGY

## 2021-11-20 PROCEDURE — 87040 BLOOD CULTURE FOR BACTERIA: CPT | Performed by: EMERGENCY MEDICINE

## 2021-11-20 PROCEDURE — 250N000011 HC RX IP 250 OP 636: Performed by: PHYSICIAN ASSISTANT

## 2021-11-20 PROCEDURE — 96361 HYDRATE IV INFUSION ADD-ON: CPT | Performed by: EMERGENCY MEDICINE

## 2021-11-20 PROCEDURE — 85027 COMPLETE CBC AUTOMATED: CPT | Performed by: INTERNAL MEDICINE

## 2021-11-20 PROCEDURE — 84100 ASSAY OF PHOSPHORUS: CPT | Performed by: INTERNAL MEDICINE

## 2021-11-20 PROCEDURE — 82040 ASSAY OF SERUM ALBUMIN: CPT | Performed by: EMERGENCY MEDICINE

## 2021-11-20 PROCEDURE — 99285 EMERGENCY DEPT VISIT HI MDM: CPT | Mod: 25 | Performed by: EMERGENCY MEDICINE

## 2021-11-20 PROCEDURE — 96365 THER/PROPH/DIAG IV INF INIT: CPT | Performed by: EMERGENCY MEDICINE

## 2021-11-20 PROCEDURE — 96366 THER/PROPH/DIAG IV INF ADDON: CPT | Performed by: EMERGENCY MEDICINE

## 2021-11-20 PROCEDURE — 250N000011 HC RX IP 250 OP 636: Performed by: EMERGENCY MEDICINE

## 2021-11-20 PROCEDURE — 96375 TX/PRO/DX INJ NEW DRUG ADDON: CPT | Performed by: EMERGENCY MEDICINE

## 2021-11-20 PROCEDURE — 85027 COMPLETE CBC AUTOMATED: CPT | Performed by: EMERGENCY MEDICINE

## 2021-11-20 PROCEDURE — 81001 URINALYSIS AUTO W/SCOPE: CPT | Performed by: EMERGENCY MEDICINE

## 2021-11-20 PROCEDURE — 71045 X-RAY EXAM CHEST 1 VIEW: CPT | Mod: 26 | Performed by: RADIOLOGY

## 2021-11-20 PROCEDURE — 83605 ASSAY OF LACTIC ACID: CPT | Performed by: EMERGENCY MEDICINE

## 2021-11-20 PROCEDURE — 87581 M.PNEUMON DNA AMP PROBE: CPT | Performed by: INTERNAL MEDICINE

## 2021-11-20 PROCEDURE — 87633 RESP VIRUS 12-25 TARGETS: CPT | Performed by: INTERNAL MEDICINE

## 2021-11-20 PROCEDURE — 87086 URINE CULTURE/COLONY COUNT: CPT | Performed by: EMERGENCY MEDICINE

## 2021-11-20 PROCEDURE — P9037 PLATE PHERES LEUKOREDU IRRAD: HCPCS | Performed by: INTERNAL MEDICINE

## 2021-11-20 PROCEDURE — 84132 ASSAY OF SERUM POTASSIUM: CPT | Performed by: INTERNAL MEDICINE

## 2021-11-20 PROCEDURE — 250N000013 HC RX MED GY IP 250 OP 250 PS 637: Performed by: INTERNAL MEDICINE

## 2021-11-20 PROCEDURE — C9803 HOPD COVID-19 SPEC COLLECT: HCPCS | Performed by: EMERGENCY MEDICINE

## 2021-11-20 PROCEDURE — 250N000011 HC RX IP 250 OP 636: Performed by: INTERNAL MEDICINE

## 2021-11-20 PROCEDURE — 87493 C DIFF AMPLIFIED PROBE: CPT | Performed by: INTERNAL MEDICINE

## 2021-11-20 PROCEDURE — 87637 SARSCOV2&INF A&B&RSV AMP PRB: CPT | Performed by: EMERGENCY MEDICINE

## 2021-11-20 PROCEDURE — 99223 1ST HOSP IP/OBS HIGH 75: CPT | Mod: AI | Performed by: INTERNAL MEDICINE

## 2021-11-20 PROCEDURE — 82784 ASSAY IGA/IGD/IGG/IGM EACH: CPT | Performed by: INTERNAL MEDICINE

## 2021-11-20 PROCEDURE — 87651 STREP A DNA AMP PROBE: CPT | Performed by: EMERGENCY MEDICINE

## 2021-11-20 PROCEDURE — 206N000001 HC R&B BMT UMMC

## 2021-11-20 PROCEDURE — 36415 COLL VENOUS BLD VENIPUNCTURE: CPT | Performed by: EMERGENCY MEDICINE

## 2021-11-20 PROCEDURE — 71045 X-RAY EXAM CHEST 1 VIEW: CPT

## 2021-11-20 PROCEDURE — 99285 EMERGENCY DEPT VISIT HI MDM: CPT | Performed by: EMERGENCY MEDICINE

## 2021-11-20 PROCEDURE — 258N000003 HC RX IP 258 OP 636: Performed by: EMERGENCY MEDICINE

## 2021-11-20 PROCEDURE — 83735 ASSAY OF MAGNESIUM: CPT | Performed by: INTERNAL MEDICINE

## 2021-11-20 RX ORDER — LORAZEPAM 2 MG/ML
.5-1 INJECTION INTRAMUSCULAR EVERY 4 HOURS PRN
Status: DISCONTINUED | OUTPATIENT
Start: 2021-11-20 | End: 2021-11-23 | Stop reason: HOSPADM

## 2021-11-20 RX ORDER — LOSARTAN POTASSIUM 50 MG/1
50 TABLET ORAL DAILY
Status: DISCONTINUED | OUTPATIENT
Start: 2021-11-20 | End: 2021-11-23 | Stop reason: HOSPADM

## 2021-11-20 RX ORDER — ONDANSETRON 2 MG/ML
8 INJECTION INTRAMUSCULAR; INTRAVENOUS EVERY 8 HOURS PRN
Status: DISCONTINUED | OUTPATIENT
Start: 2021-11-20 | End: 2021-11-20

## 2021-11-20 RX ORDER — POTASSIUM CHLORIDE 750 MG/1
40 TABLET, EXTENDED RELEASE ORAL ONCE
Status: COMPLETED | OUTPATIENT
Start: 2021-11-20 | End: 2021-11-20

## 2021-11-20 RX ORDER — HEPARIN SODIUM,PORCINE 10 UNIT/ML
5-20 VIAL (ML) INTRAVENOUS
Status: DISCONTINUED | OUTPATIENT
Start: 2021-11-20 | End: 2021-11-23 | Stop reason: HOSPADM

## 2021-11-20 RX ORDER — LORAZEPAM 0.5 MG/1
.5-1 TABLET ORAL EVERY 4 HOURS PRN
Status: DISCONTINUED | OUTPATIENT
Start: 2021-11-20 | End: 2021-11-23 | Stop reason: HOSPADM

## 2021-11-20 RX ORDER — LOPERAMIDE HCL 2 MG
2 CAPSULE ORAL 4 TIMES DAILY PRN
COMMUNITY
End: 2022-01-19

## 2021-11-20 RX ORDER — ACETAMINOPHEN 325 MG/1
650 TABLET ORAL ONCE
Status: DISCONTINUED | OUTPATIENT
Start: 2021-11-20 | End: 2021-11-23 | Stop reason: HOSPADM

## 2021-11-20 RX ORDER — PANTOPRAZOLE SODIUM 40 MG/1
40 TABLET, DELAYED RELEASE ORAL DAILY
Status: DISCONTINUED | OUTPATIENT
Start: 2021-11-20 | End: 2021-11-23 | Stop reason: HOSPADM

## 2021-11-20 RX ORDER — HEPARIN SODIUM,PORCINE 10 UNIT/ML
5-20 VIAL (ML) INTRAVENOUS EVERY 24 HOURS
Status: DISCONTINUED | OUTPATIENT
Start: 2021-11-20 | End: 2021-11-23 | Stop reason: HOSPADM

## 2021-11-20 RX ORDER — LEVOTHYROXINE SODIUM 50 UG/1
50 TABLET ORAL DAILY
Status: DISCONTINUED | OUTPATIENT
Start: 2021-11-20 | End: 2021-11-23 | Stop reason: HOSPADM

## 2021-11-20 RX ORDER — ONDANSETRON 2 MG/ML
8 INJECTION INTRAMUSCULAR; INTRAVENOUS ONCE
Status: COMPLETED | OUTPATIENT
Start: 2021-11-20 | End: 2021-11-20

## 2021-11-20 RX ORDER — ONDANSETRON 8 MG/1
8 TABLET, FILM COATED ORAL EVERY 8 HOURS SCHEDULED
Status: DISCONTINUED | OUTPATIENT
Start: 2021-11-20 | End: 2021-11-23 | Stop reason: HOSPADM

## 2021-11-20 RX ORDER — DEXTROSE MONOHYDRATE 50 MG/ML
10-20 INJECTION, SOLUTION INTRAVENOUS
Status: DISCONTINUED | OUTPATIENT
Start: 2021-11-20 | End: 2021-11-23 | Stop reason: HOSPADM

## 2021-11-20 RX ORDER — POTASSIUM CHLORIDE 750 MG/1
20 TABLET, EXTENDED RELEASE ORAL DAILY
Status: DISCONTINUED | OUTPATIENT
Start: 2021-11-20 | End: 2021-11-23 | Stop reason: HOSPADM

## 2021-11-20 RX ORDER — ONDANSETRON 4 MG/1
8 TABLET, FILM COATED ORAL EVERY 8 HOURS PRN
Status: DISCONTINUED | OUTPATIENT
Start: 2021-11-20 | End: 2021-11-20

## 2021-11-20 RX ORDER — IOPAMIDOL 755 MG/ML
97 INJECTION, SOLUTION INTRAVASCULAR ONCE
Status: COMPLETED | OUTPATIENT
Start: 2021-11-20 | End: 2021-11-20

## 2021-11-20 RX ORDER — GABAPENTIN 300 MG/1
300 CAPSULE ORAL 2 TIMES DAILY
Status: DISCONTINUED | OUTPATIENT
Start: 2021-11-20 | End: 2021-11-23 | Stop reason: HOSPADM

## 2021-11-20 RX ORDER — POTASSIUM CHLORIDE 29.8 MG/ML
20 INJECTION INTRAVENOUS
Status: DISPENSED | OUTPATIENT
Start: 2021-11-20 | End: 2021-11-20

## 2021-11-20 RX ORDER — ACETAMINOPHEN 325 MG/1
325-650 TABLET ORAL EVERY 4 HOURS PRN
Status: DISCONTINUED | OUTPATIENT
Start: 2021-11-20 | End: 2021-11-23 | Stop reason: HOSPADM

## 2021-11-20 RX ORDER — PROCHLORPERAZINE MALEATE 5 MG
10 TABLET ORAL EVERY 6 HOURS PRN
Status: DISCONTINUED | OUTPATIENT
Start: 2021-11-20 | End: 2021-11-23 | Stop reason: HOSPADM

## 2021-11-20 RX ORDER — ACYCLOVIR 800 MG/1
800 TABLET ORAL 2 TIMES DAILY
Status: DISCONTINUED | OUTPATIENT
Start: 2021-11-20 | End: 2021-11-23 | Stop reason: HOSPADM

## 2021-11-20 RX ORDER — PROCHLORPERAZINE MALEATE 5 MG
5-10 TABLET ORAL EVERY 6 HOURS PRN
Status: DISCONTINUED | OUTPATIENT
Start: 2021-11-20 | End: 2021-11-23 | Stop reason: HOSPADM

## 2021-11-20 RX ORDER — FLUCONAZOLE 200 MG/1
200 TABLET ORAL DAILY
Status: DISCONTINUED | OUTPATIENT
Start: 2021-11-20 | End: 2021-11-23 | Stop reason: HOSPADM

## 2021-11-20 RX ADMIN — FLUCONAZOLE 200 MG: 200 TABLET ORAL at 12:08

## 2021-11-20 RX ADMIN — ACYCLOVIR 800 MG: 800 TABLET ORAL at 12:08

## 2021-11-20 RX ADMIN — IOPAMIDOL 97 ML: 755 INJECTION, SOLUTION INTRAVENOUS at 09:53

## 2021-11-20 RX ADMIN — B-COMPLEX W/ C & FOLIC ACID TAB 1 TABLET: TAB at 12:07

## 2021-11-20 RX ADMIN — Medication 1 TABLET: at 12:08

## 2021-11-20 RX ADMIN — LEVOTHYROXINE SODIUM 50 MCG: 0.05 TABLET ORAL at 12:08

## 2021-11-20 RX ADMIN — GABAPENTIN 300 MG: 300 CAPSULE ORAL at 20:26

## 2021-11-20 RX ADMIN — ACETAMINOPHEN 650 MG: 325 TABLET, FILM COATED ORAL at 12:07

## 2021-11-20 RX ADMIN — ONDANSETRON HYDROCHLORIDE 8 MG: 8 TABLET, FILM COATED ORAL at 22:23

## 2021-11-20 RX ADMIN — ACYCLOVIR 800 MG: 800 TABLET ORAL at 20:23

## 2021-11-20 RX ADMIN — ONDANSETRON 8 MG: 2 INJECTION INTRAMUSCULAR; INTRAVENOUS at 05:56

## 2021-11-20 RX ADMIN — CEFEPIME HYDROCHLORIDE 2 G: 2 INJECTION, POWDER, FOR SOLUTION INTRAVENOUS at 06:27

## 2021-11-20 RX ADMIN — Medication 350 MCG: at 20:23

## 2021-11-20 RX ADMIN — METRONIDAZOLE 500 MG: 500 INJECTION, SOLUTION INTRAVENOUS at 22:23

## 2021-11-20 RX ADMIN — CEFEPIME HYDROCHLORIDE 2 G: 2 INJECTION, POWDER, FOR SOLUTION INTRAVENOUS at 21:10

## 2021-11-20 RX ADMIN — METRONIDAZOLE 500 MG: 500 INJECTION, SOLUTION INTRAVENOUS at 14:05

## 2021-11-20 RX ADMIN — PANTOPRAZOLE SODIUM 40 MG: 40 TABLET, DELAYED RELEASE ORAL at 12:08

## 2021-11-20 RX ADMIN — GABAPENTIN 300 MG: 300 CAPSULE ORAL at 12:08

## 2021-11-20 RX ADMIN — POTASSIUM CHLORIDE 40 MEQ: 750 TABLET, EXTENDED RELEASE ORAL at 15:59

## 2021-11-20 RX ADMIN — ACETAMINOPHEN 650 MG: 325 TABLET, FILM COATED ORAL at 16:08

## 2021-11-20 RX ADMIN — Medication 5 ML: at 17:33

## 2021-11-20 RX ADMIN — ONDANSETRON HYDROCHLORIDE 8 MG: 8 TABLET, FILM COATED ORAL at 13:52

## 2021-11-20 RX ADMIN — DEXTROSE MONOHYDRATE 20 ML: 50 INJECTION, SOLUTION INTRAVENOUS at 20:20

## 2021-11-20 RX ADMIN — LORAZEPAM 1 MG: 2 INJECTION INTRAMUSCULAR; INTRAVENOUS at 21:16

## 2021-11-20 RX ADMIN — VANCOMYCIN HYDROCHLORIDE 1750 MG: 1 INJECTION, POWDER, LYOPHILIZED, FOR SOLUTION INTRAVENOUS at 07:03

## 2021-11-20 RX ADMIN — DEXTROSE MONOHYDRATE 20 ML: 50 INJECTION, SOLUTION INTRAVENOUS at 20:25

## 2021-11-20 RX ADMIN — CEFEPIME HYDROCHLORIDE 2 G: 2 INJECTION, POWDER, FOR SOLUTION INTRAVENOUS at 13:29

## 2021-11-20 RX ADMIN — POTASSIUM CHLORIDE 20 MEQ: 750 TABLET, EXTENDED RELEASE ORAL at 12:08

## 2021-11-20 RX ADMIN — SODIUM CHLORIDE 1000 ML: 9 INJECTION, SOLUTION INTRAVENOUS at 05:56

## 2021-11-20 ASSESSMENT — ACTIVITIES OF DAILY LIVING (ADL)
DIFFICULTY_COMMUNICATING: NO
ADLS_ACUITY_SCORE: 3
ADLS_ACUITY_SCORE: 3
DOING_ERRANDS_INDEPENDENTLY_DIFFICULTY: NO
ADLS_ACUITY_SCORE: 3
TOILETING_ISSUES: NO
ADLS_ACUITY_SCORE: 3
HEARING_DIFFICULTY_OR_DEAF: NO
ADLS_ACUITY_SCORE: 3
ADLS_ACUITY_SCORE: 3
FALL_HISTORY_WITHIN_LAST_SIX_MONTHS: NO
ADLS_ACUITY_SCORE: 3
DIFFICULTY_EATING/SWALLOWING: NO
PATIENT_/_FAMILY_COMMUNICATION_STYLE: SPOKEN LANGUAGE (ENGLISH OR BILINGUAL)
ADLS_ACUITY_SCORE: 3
CONCENTRATING,_REMEMBERING_OR_MAKING_DECISIONS_DIFFICULTY: NO
ADLS_ACUITY_SCORE: 3
DRESSING/BATHING_DIFFICULTY: NO
ADLS_ACUITY_SCORE: 3
WALKING_OR_CLIMBING_STAIRS_DIFFICULTY: NO
WEAR_GLASSES_OR_BLIND: NO
ADLS_ACUITY_SCORE: 3

## 2021-11-20 ASSESSMENT — MIFFLIN-ST. JEOR: SCORE: 1515.78

## 2021-11-20 NOTE — H&P
BMT H&P     ID: June Ronquillo is a 50 yo man D+9 s/p auto PBSCT for high risk MM. Re-admitted with neutropenic fevers, abdominal pain, nausea and diarrhea.      HPI: Admitted through the ED for neutropenic fever, diarrhea, nausea and abdominal pain. Diarrhea much worse over the last day or so. Took one dose of imodium and now has not had any stool. Has epigastric pain. No vomiting but remains nauseated. Had a temp to 100.4 at home. Also endorses headache, right ear pain, and sore throat. No mouth sores that he knows of. Oral intake has been minimal.      ROS: Negative except as stated above in HPI     Physical Exam  Blood pressure 138/77, pulse 109, temperature 98.8  F (37.1  C), temperature source Oral, resp. rate 18, SpO2 100 %.    Wt Readings from Last 4 Encounters:   11/19/21 72.1 kg (159 lb)   11/18/21 72.3 kg (159 lb 6.4 oz)   11/17/21 72.3 kg (159 lb 4.8 oz)   11/16/21 72.5 kg (159 lb 12.8 oz)     General: NAD, appears to feel unwell.    Eyes: sclera anicteric   Nose/Mouth/Throat: OP dry, no ulcerations   Lungs: CTA bilaterally  Cardiovascular: RRR, no M/R/G   Abdominal/Rectal: +BS, soft, mild tenderness to palpation without guarding.   Lymphatics: No edema  Skin: No rashes  Neuro: A&O   Access: CVC site NT, no drainage.     Labs:  Lab Results   Component Value Date    WBC 0.1 (LL) 11/20/2021    ANEU 1.7 11/17/2021    HGB 9.8 (L) 11/20/2021    HCT 28.3 (L) 11/20/2021    PLT 11 (LL) 11/20/2021     (L) 11/20/2021    POTASSIUM 3.3 (L) 11/20/2021    CHLORIDE 101 11/20/2021    CO2 23 11/20/2021     (H) 11/20/2021    BUN 6 (L) 11/20/2021    CR 0.68 11/20/2021    MAG 2.1 11/18/2021    INR 1.06 11/14/2021    BILITOTAL 0.4 11/20/2021    AST 8 11/20/2021    ALT 9 11/20/2021    ALKPHOS 53 11/20/2021    PROTTOTAL 5.8 (L) 11/20/2021    ALBUMIN 2.8 (L) 11/20/2021       I have assessed all abnormal lab values for their clinical significance and any values considered clinically significant have been  addressed in the assessment and plan.      Past Medical History:   Diagnosis Date     CAD (coronary artery disease)     s/p stent to CFX     Heart attack (H)      Hyperlipidemia LDL goal <100      Hypertension      Stented coronary artery      Thyroid disease        Social History     Socioeconomic History     Marital status:      Spouse name: Not on file     Number of children: Not on file     Years of education: Not on file     Highest education level: Not on file   Occupational History     Not on file   Tobacco Use     Smoking status: Never Smoker     Smokeless tobacco: Never Used   Substance and Sexual Activity     Alcohol use: Not Currently     Drug use: No     Sexual activity: Yes     Partners: Female   Other Topics Concern     Parent/sibling w/ CABG, MI or angioplasty before 65F 55M? No      Service Not Asked     Blood Transfusions Not Asked     Caffeine Concern No     Occupational Exposure Not Asked     Hobby Hazards Not Asked     Sleep Concern Not Asked     Stress Concern Not Asked     Weight Concern Not Asked     Special Diet No     Back Care Not Asked     Exercise No     Bike Helmet Not Asked     Seat Belt Yes     Self-Exams Not Asked   Social History Narrative     Not on file     Social Determinants of Health     Financial Resource Strain: Not on file   Food Insecurity: Not on file   Transportation Needs: Not on file   Physical Activity: Not on file   Stress: Not on file   Social Connections: Not on file   Intimate Partner Violence: Not At Risk     Fear of Current or Ex-Partner: No     Emotionally Abused: No     Physically Abused: No     Sexually Abused: No   Housing Stability: Not on file     Family History   Problem Relation Age of Onset     Hypertension Mother      Hyperlipidemia Mother      Heart Disease Paternal Grandmother      Hyperlipidemia Sister      Hyperlipidemia Sister          A/P: June Ronquillo is a 50 yo man D+9 s/p auto PBSCT for high risk MM      1. BMT  - BMT  doc/Coordinator: Miguelangel/Du Newsome; Lorelei/Christine  - Cell dose 6.99 x 10^6; gave all cells due to high risk disease  - GCSF started day +5, continue until ANC > 2500 for 2 consecutive days.      2. HEME/COAG  - Transfusion parameters: hgb <7g/dL and plts <10,000..   - pancytopenic but not needing transfusions yet. Now neutropenic.     3. ID.   Neutropenic fever 11/20: vanco given in ED. COVID and CXR neg. Continue cefepime.  - Bld cx pending.    -Pending: abdominal CT, RVP, RSV, C diff, throat Cx, UA, Ucx,     Prophy:  Fluc, Levo (hold with cefepime), ACV. Bactrim for PCP prophy to start at day +28     4. GI/NUTRITION  - Abdominal discomfort: CT A/P final read pending.   - DAYNA: schedule Zofran, compazine prn.    - loose stools, cdiff neg. 11/16. Imodium prn. Repeat c-diff on admission.   - Ulcer prophy: Protonix 40mg daily.      5. CV  - Hx of CAD, MI at age 28 with stent placed  - HTN: cont losartan. Hold hydrochlorothiazide through transplant. Hold on admission d/t decreased oral intake.   - Hyperlipidemia: hold statin through transplant     6 RENAL/FEN  - Cr stable  - Hyponatremia: likely d/t poor oral intake. 1L NS given in the ED. No maintenance fluids for now.      7. ENDOCRINE  - hx hypothyroidism  - new diagnosis of papillary thyroid carcinoma per bx result from 11/2, appears that the plan is for thyroidectomy after BMT     8. Neuro  - peripheral neuropathy: gabapentin 300mg BID.  His feet felt worse on higher dose, he reports.   - start B complex vitamin (contains vit C) about 2 weeks post transplant to avoid interaction with chemo     Dispo: Pt will remain admitted through engraftment and work up/management of neutropenic fevers. Anticipate 4-5 days.     Shukri Moffett PA-C  [phone]    Attending Summary  The patient was seen and examined by me separate from the midlevel provider.The note above reflects my assessment and plan. I have personally reviewed today's labs,vital and radiology results. The points of care  that were added by me are:     History and physical  June Ronquillo is a 52 yo man D+9 s/p auto PBSCT for high risk MM. Re-admitted with neutropenic fevers, abdominal pain, nausea and diarrhea.     On exam:  Head/mouth/neck: Oropharynx clear.  No lymphadenopathy.  Heart: Regular rate and rhythm.  No murmurs rubs or gallops.  Lungs: Lungs are clear bilaterally.  Abdomen: Abdomen is soft, nondistended.  Intact bowel sounds.  Ext: No edema.  Skin: No rash.    Pertinent Labs:  Reviewed in full. COVID negative, influenza A/B negative, and RSV negative.    ASSESSMENT AND PLAN  1.  MM: Day +9 s/p autoHCT  2.  Neutropenic fever: CT consistent with enteritis/colitis. Initiate empiric antibiotics. Following cultures.    Dominguez Gutierrez MD

## 2021-11-20 NOTE — ED TRIAGE NOTES
Patient presents to the emergency department with complaint of fever at home of 100.4. Patient denies taking anything for fever at home. Hx of multiple myeloma. Blood transfusion eight days ago.

## 2021-11-20 NOTE — PHARMACY-ADMISSION MEDICATION HISTORY
Admission Medication History Completed by Pharmacy    See [facility] Admission Navigator for allergy information, preferred outpatient pharmacy, prior to admission medications and immunization status.     Medication History Sources:   BMT Clinic Records    Changes made to PTA medication list (reason):    Added: Loperamide prn    Deleted: None    Changed: None    Additional Information:    None    Prior to Admission medications    Medication Sig Last Dose Taking? Auth Provider   acyclovir (ZOVIRAX) 800 MG tablet Take 1 tablet (800 mg) by mouth 2 times daily 11/19/2021 at Unknown time Yes Bibi Delgado PA-C   calcium carb-cholecalciferol (OS-HARDEEP) 500-200 MG-UNIT tablet Take 1 tablet by mouth daily 11/19/2021 at Unknown time Yes Haven Wells PA-C   fluconazole (DIFLUCAN) 200 MG tablet Take 1 tablet (200 mg) by mouth daily 11/19/2021 at Unknown time Yes Bibi Delgado PA-C   gabapentin (NEURONTIN) 300 MG capsule Take 1 capsule (300 mg) by mouth 2 times daily 11/19/2021 at Unknown time Yes Sneha Vance PA-C   levofloxacin (LEVAQUIN) 250 MG tablet Take 1 tablet (250 mg) by mouth daily 11/19/2021 at Unknown time Yes Bibi Delgado PA-C   loperamide (IMODIUM) 2 MG capsule Take 2 mg by mouth 4 times daily as needed for diarrhea  Yes Unknown, Entered By History   losartan (COZAAR) 50 MG tablet Take 1 tablet (50 mg) by mouth daily 11/19/2021 at Unknown time Yes Dominguez Craig MD   ondansetron (ZOFRAN) 8 MG tablet Take 1 tablet (8 mg) by mouth every 8 hours as needed for nausea 11/19/2021 at Unknown time Yes Bibi Delgado PA-C   pantoprazole (PROTONIX) 40 MG EC tablet Take 1 tablet (40 mg) by mouth daily 11/19/2021 at Unknown time Yes Bibi Delgado PA-C   potassium chloride ER (KLOR-CON M) 10 MEQ CR tablet Take 2 tablets (20 mEq) by mouth daily 11/19/2021 at Unknown time Yes Barbi Vazquez MD   prochlorperazine (COMPAZINE) 10 MG tablet Take 1 tablet (10 mg) by mouth  every 6 hours as needed (Nausea/Vomiting) 11/19/2021 at Unknown time Yes Barbi Vazquez MD   vitamin B complex with vitamin C (STRESS TAB) tablet Take 1 tablet by mouth daily *do not start until 2 weeks post transplant (11/24) 11/19/2021 at Unknown time Yes Bibi Delgado PA-C   levothyroxine (SYNTHROID/LEVOTHROID) 50 MCG tablet Take 1 tablet (50 mcg) by mouth daily   Haven Wells PA-C       Date completed: 11/20/21    Medication history completed by: Andy J. Kurtzweil, PharmD

## 2021-11-20 NOTE — PROVIDER NOTIFICATION
Costa Mix  notified regarding need for new  type and screen order,plt's level 5,000,K+3.2 and pt needs order for heparin flush.

## 2021-11-20 NOTE — ED PROVIDER NOTES
ED Provider Note  [facility]      History     Chief Complaint   Patient presents with     Fever     HPI  June Ronquillo is a 51 year old male with history of multiple myeloma, day 9 status post stem cell transplant who presents feeling ill.  He states that he was seen in clinic yesterday, on the way home vomited.  No further vomiting, but he has some nausea.  He also had been struggling with diarrhea, but now it is much worse today, increased frequency, more watery.  No blood in stools.  He is also developed some epigastric abdominal discomfort.  No cough or shortness of breath.  He states that tonight he is also developed a headache behind his right eye, right ear pain, and sore throat.  No rash.  He is checked his temperature several times, and the max was 100.4.  He was neutropenic earlier in clinic yesterday, approximately 14 hours ago.    Past Medical History  Past Medical History:   Diagnosis Date     CAD (coronary artery disease)     s/p stent to CFX     Heart attack (H)      Hyperlipidemia LDL goal <100      Hypertension      Stented coronary artery      Thyroid disease      Past Surgical History:   Procedure Laterality Date     BONE MARROW BIOPSY, BONE SPECIMEN, NEEDLE/TROCAR Left 5/17/2021    Procedure: BIOPSY, BONE MARROW with aspiration and ancillary studies;  Surgeon: Niharika Regalado MD;  Location: [facility] OR     BONE MARROW BIOPSY, BONE SPECIMEN, NEEDLE/TROCAR Left 10/14/2021    Procedure: BIOPSY, BONE MARROW;  Surgeon: Alexa Albert APRN CNP;  Location: [facility] OR     HEART CATH PTCA SINGLE VESSEL  10/10/2004    Stent to CFX - [facility]      INSERT CATHETER VASCULAR ACCESS Right 11/3/2021    Procedure: NON VALVED TUNNELED DUAL LUMEN APHARESIS CAPABLE LINE INSERTION, VASCULAR ACCESS CATHETER;  Surgeon: Werner Mcnamara MD;  Location: [facility] OR     IR CVC TUNNEL PLACEMENT > 5 YRS OF AGE  11/3/2021     acyclovir (ZOVIRAX) 800 MG tablet  calcium  carb-cholecalciferol (OS-HARDEEP) 500-200 MG-UNIT tablet  fluconazole (DIFLUCAN) 200 MG tablet  gabapentin (NEURONTIN) 300 MG capsule  levofloxacin (LEVAQUIN) 250 MG tablet  levothyroxine (SYNTHROID/LEVOTHROID) 50 MCG tablet  losartan (COZAAR) 50 MG tablet  ondansetron (ZOFRAN) 8 MG tablet  pantoprazole (PROTONIX) 40 MG EC tablet  potassium chloride ER (KLOR-CON M) 10 MEQ CR tablet  prochlorperazine (COMPAZINE) 10 MG tablet  vitamin B complex with vitamin C (STRESS TAB) tablet      Allergies   Allergen Reactions     Blood Transfusion Related (Informational Only) Other (See Comments)     Patient has a history of a clinically significant antibody against RBC antigens.  A delay in compatible RBCs may occur.      Family History  Family History   Problem Relation Age of Onset     Hypertension Mother      Hyperlipidemia Mother      Heart Disease Paternal Grandmother      Hyperlipidemia Sister      Hyperlipidemia Sister      Social History   Social History     Tobacco Use     Smoking status: Never Smoker     Smokeless tobacco: Never Used   Substance Use Topics     Alcohol use: Not Currently     Drug use: No      Past medical history, past surgical history, medications, allergies, family history, and social history were reviewed with the patient. No additional pertinent items.       Review of Systems  A complete review of systems was performed with pertinent positives and negatives noted in the HPI, and all other systems negative.    Physical Exam   BP: (!) 112/90  Pulse: 113  Temp: 99  F (37.2  C)  Resp: 22  SpO2: 100 %  Physical Exam  Constitutional:       General: He is not in acute distress.     Appearance: He is not diaphoretic.   HENT:      Head: Atraumatic.      Right Ear: Tympanic membrane normal.      Left Ear: Tympanic membrane normal.   Eyes:      General: No scleral icterus.     Pupils: Pupils are equal, round, and reactive to light.   Cardiovascular:      Rate and Rhythm: Tachycardia present.      Heart sounds:  Normal heart sounds.   Pulmonary:      Effort: No respiratory distress.      Breath sounds: Normal breath sounds.   Abdominal:      Palpations: Abdomen is soft.      Tenderness: There is abdominal tenderness (very mild diffuse tenderness without guarding).   Musculoskeletal:         General: No tenderness.   Skin:     General: Skin is warm.      Findings: No rash.         ED Course      Procedures              Results for orders placed or performed in visit on 11/20/21   Prepare pheresed platelets (unit)     Status: None (Preliminary result)   Result Value Ref Range    UNIT ABO/RH A Pos     Unit Number [id number]     Unit Status Ready     Blood Component Type Platelets     Product Code V8084F69     CODING SYSTEM TQCG146     UNIT TYPE ISBT 6200    Prepare pheresed platelets (unit)     Status: None (Preliminary result)   Result Value Ref Range    UNIT ABO/RH A Pos     Unit Number [id number]     Unit Status Ready     Blood Component Type Platelets     Product Code V7112M74     CODING SYSTEM BDUD070     UNIT TYPE ISBT 6200    Results for orders placed or performed during the hospital encounter of 11/20/21   XR Chest Port 1 View     Status: None    Narrative    EXAM: XR CHEST PORT 1 VIEW  LOCATION: [facility]  DATE/TIME: 11/20/2021 6:31 AM    INDICATION: fever  COMPARISON: None.      Impression    IMPRESSION: Cardiomediastinal silhouette within normal limits. Mild left basilar atelectasis. No vascular congestion or pleural effusion. Right central line tip cavoatrial junction.   Comprehensive metabolic panel     Status: Abnormal   Result Value Ref Range    Sodium 132 (L) 133 - 144 mmol/L    Potassium 3.3 (L) 3.4 - 5.3 mmol/L    Chloride 101 94 - 109 mmol/L    Carbon Dioxide (CO2) 23 20 - 32 mmol/L    Anion Gap 8 3 - 14 mmol/L    Urea Nitrogen 6 (L) 7 - 30 mg/dL    Creatinine 0.68 0.66 - 1.25 mg/dL    Calcium 7.6 (L) 8.5 - 10.1 mg/dL    Glucose 124 (H) 70 - 99 mg/dL     Alkaline Phosphatase 53 40 - 150 U/L    AST 8 0 - 45 U/L    ALT 9 0 - 70 U/L    Protein Total 5.8 (L) 6.8 - 8.8 g/dL    Albumin 2.8 (L) 3.4 - 5.0 g/dL    Bilirubin Total 0.4 0.2 - 1.3 mg/dL    GFR Estimate >90 >60 mL/min/1.73m2   Lactic acid whole blood     Status: Normal   Result Value Ref Range    Lactic Acid 0.8 0.7 - 2.0 mmol/L   CBC with platelets and differential     Status: Abnormal   Result Value Ref Range    WBC Count 0.1 (LL) 4.0 - 11.0 10e3/uL    RBC Count 3.19 (L) 4.40 - 5.90 10e6/uL    Hemoglobin 9.8 (L) 13.3 - 17.7 g/dL    Hematocrit 28.3 (L) 40.0 - 53.0 %    MCV 89 78 - 100 fL    MCH 30.7 26.5 - 33.0 pg    MCHC 34.6 31.5 - 36.5 g/dL    RDW 14.1 10.0 - 15.0 %    Platelet Count 11 (LL) 150 - 450 10e3/uL   RBC and Platelet Morphology     Status: None   Result Value Ref Range    Platelet Assessment  Automated Count Confirmed. Platelet morphology is normal.     Automated Count Confirmed. Platelet morphology is normal.    RBC Morphology Confirmed RBC Indices    Streptococcus A Rapid Scr w Reflx to PCR     Status: Normal    Specimen: Throat; Swab   Result Value Ref Range    Group A Strep antigen Negative Negative   CBC with platelets differential     Status: Abnormal    Narrative    The following orders were created for panel order CBC with platelets differential.  Procedure                               Abnormality         Status                     ---------                               -----------         ------                     CBC with platelets and d...[[id number]]  Abnormal            Final result               RBC and Platelet Morphology[[id number]]                      Final result                 Please view results for these tests on the individual orders.     Medications   vancomycin (VANCOCIN) 1,750 mg in sodium chloride 0.9 % 500 mL intermittent infusion (1,750 mg Intravenous New Bag 11/20/21 0703)   vancomycin 1250 mg in 0.9% NaCl 250 mL intermittent infusion 1,250 mg (has no administration  in time range)   acetaminophen (TYLENOL) tablet 650 mg (has no administration in time range)   0.9% sodium chloride BOLUS (1,000 mLs Intravenous New Bag 11/20/21 4156)   ondansetron (ZOFRAN) injection 8 mg (8 mg Intravenous Given 11/20/21 0556)   ceFEPIme (MAXIPIME) 2 g vial to attach to  ml bag for ADULTS or 50 ml bag for PEDS (0 g Intravenous Stopped 11/20/21 0703)        Assessments & Plan (with Medical Decision Making)   Patient has a total white count of 0.1.  Clearly neutropenic.  No clear source at this point.  Still waiting on a urine and stool study.  CT abdomen also pending.  Chest x-ray is negative.  He did get cefepime and vancomycin.  He will be admitted to BMT for further acute management.  Blood cultures were also taken.  Covid is pending, rapid strep negative.  Neck is supple, states headache is actually improving.  My suspicion for meningitis is low.    Dictation Disclaimer: Some of this Note has been completed with voice-recognition dictation software. Although errors are generally corrected real-time, there is the potential for a rare error to be present in the completed chart.      I have reviewed the nursing notes. I have reviewed the findings, diagnosis, plan and need for follow up with the patient.    New Prescriptions    No medications on file       Final diagnoses:   Febrile neutropenia (H)       --  Georgina Reilly  [facility] EMERGENCY DEPARTMENT  11/20/2021     Georgina Reilly MD  11/20/21 0750

## 2021-11-20 NOTE — PLAN OF CARE
Pt.admitted from ED with c/o abdominal pain nausea, diarrhea and fevers.Pt. is A&Ox4up independently,c/o abdominal pain 4/10 and throat pain tylenol given for comfort.LS clear,BS+,voided adequately ,good appetite ,no BM,plt's 5,000 will need plt.'s transfusion,and potassium replacement.Will continue to monitor.

## 2021-11-20 NOTE — PHARMACY-VANCOMYCIN DOSING SERVICE
Pharmacy Vancomycin Initial Note  Date of Service 2021  Patient's  1970  51 year old, male    Indication: Febrile Neutropenia    Current estimated CrCl = Estimated Creatinine Clearance: 128.1 mL/min (based on SCr of 0.68 mg/dL).    Creatinine for last 3 days  2021: 12:14 PM Creatinine 0.80 mg/dL  2021:  1:46 PM Creatinine 0.71 mg/dL  2021:  2:20 PM Creatinine 0.68 mg/dL    Recent Vancomycin Level(s) for last 3 days  No results found for requested labs within last 72 hours.      Vancomycin IV Administrations (past 72 hours)      No vancomycin orders with administrations in past 72 hours.                Nephrotoxins and other renal medications (From now, onward)    None          Contrast Orders - past 72 hours (72h ago, onward)            None          Insight[employer] Prediction of Planned Initial Vancomycin Regimen  Loading dose: 1750 mg at 05:30 2021.  Regimen: 1250 mg IV every 12 hours.  Start time: 1730 on 2021  Exposure target: AUC24 (range)400-600 mg/L.hr   AUC24,ss: 526 mg/L.hr  Probability of AUC24 > 400: 76 %  Ctrough,ss: 15.4 mg/L  Probability of Ctrough,ss > 20: 31 %  Probability of nephrotoxicity (Lodise JESSICA ): 11 %          Plan:  1. Start vancomycin  1750 mg IV once, followed by 1250 mg IV q12h.   2. Vancomycin monitoring method: AUC  3. Vancomycin therapeutic monitoring goal: 400-600 mg*h/L  4. Pharmacy will check vancomycin levels as appropriate in 1-3 Days.    5. Serum creatinine levels will be ordered daily for the first week of therapy and at least twice weekly for subsequent weeks.      Harley Gunter [facility]

## 2021-11-20 NOTE — CONFIDENTIAL NOTE
Brief Heme/Onc On-Call Note  I was contacted by the patient's wife about some new symptoms today.    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    He has not been feeling well at all today. He was in yesterday to be seen and received some fluids. Today, he's had worsening GI distress - pain and emesis after eating. In the last couple of hours he's developed significant unilateral ear pain as well. He is unable to sleep due to the pain. They have been taking his temperature, and it has been between .4F.     He has not taken anything for the ear pain so as now to mask possible fevers.    I discussed with the patient and his wife that complications at this point after auto transplant are quite common, particularly GI distress and fevers. Given his WBC count of 0.1, his fevers up to 100.4F and his new symptoms, I recommended that the safest course of action would be to be evaluated in the ED for febrile neutropenia.    Patient and wife were agreeable, and planned to come to the [facility] ED ASAP.     Brett Troy MD PhD  Heme/Onc/Transplant Fellow  Pgr [phone]

## 2021-11-21 LAB
ANION GAP SERPL CALCULATED.3IONS-SCNC: 6 MMOL/L (ref 3–14)
APTT PPP: 46 SECONDS (ref 22–38)
BACTERIA UR CULT: NORMAL
BUN SERPL-MCNC: 5 MG/DL (ref 7–30)
C COLI+JEJUNI+LARI FUSA STL QL NAA+PROBE: NOT DETECTED
CALCIUM SERPL-MCNC: 7.3 MG/DL (ref 8.5–10.1)
CHLORIDE BLD-SCNC: 106 MMOL/L (ref 94–109)
CO2 SERPL-SCNC: 22 MMOL/L (ref 20–32)
CREAT SERPL-MCNC: 0.67 MG/DL (ref 0.66–1.25)
D DIMER PPP FEU-MCNC: 1.38 UG/ML FEU (ref 0–0.5)
EC STX1 GENE STL QL NAA+PROBE: NOT DETECTED
EC STX2 GENE STL QL NAA+PROBE: NOT DETECTED
ELLIPTOCYTES BLD QL SMEAR: SLIGHT
ERYTHROCYTE [DISTWIDTH] IN BLOOD BY AUTOMATED COUNT: 14.3 % (ref 10–15)
FIBRINOGEN PPP-MCNC: 485 MG/DL (ref 170–490)
GFR SERPL CREATININE-BSD FRML MDRD: >90 ML/MIN/1.73M2
GLUCOSE BLD-MCNC: 116 MG/DL (ref 70–99)
HCT VFR BLD AUTO: 24.4 % (ref 40–53)
HGB BLD-MCNC: 8.5 G/DL (ref 13.3–17.7)
INR PPP: 1.23 (ref 0.85–1.15)
MAGNESIUM SERPL-MCNC: 2.1 MG/DL (ref 1.6–2.3)
MAGNESIUM SERPL-MCNC: 2.1 MG/DL (ref 1.6–2.3)
MCH RBC QN AUTO: 30.9 PG (ref 26.5–33)
MCHC RBC AUTO-ENTMCNC: 34.8 G/DL (ref 31.5–36.5)
MCV RBC AUTO: 89 FL (ref 78–100)
NOROV GI+II ORF1-ORF2 JNC STL QL NAA+PR: NOT DETECTED
PHOSPHATE SERPL-MCNC: 0.9 MG/DL (ref 2.5–4.5)
PHOSPHATE SERPL-MCNC: 1.2 MG/DL (ref 2.5–4.5)
PHOSPHATE SERPL-MCNC: 1.8 MG/DL (ref 2.5–4.5)
PLAT MORPH BLD: ABNORMAL
PLATELET # BLD AUTO: 30 10E3/UL (ref 150–450)
POTASSIUM BLD-SCNC: 3.3 MMOL/L (ref 3.4–5.3)
POTASSIUM BLD-SCNC: 3.5 MMOL/L (ref 3.4–5.3)
RBC # BLD AUTO: 2.75 10E6/UL (ref 4.4–5.9)
RBC MORPH BLD: ABNORMAL
RVA NSP5 STL QL NAA+PROBE: NOT DETECTED
SALMONELLA SP RPOD STL QL NAA+PROBE: NOT DETECTED
SHIGELLA SP+EIEC IPAH STL QL NAA+PROBE: NOT DETECTED
SODIUM SERPL-SCNC: 134 MMOL/L (ref 133–144)
V CHOL+PARA RFBL+TRKH+TNAA STL QL NAA+PR: NOT DETECTED
WBC # BLD AUTO: 0.3 10E3/UL (ref 4–11)
Y ENTERO RECN STL QL NAA+PROBE: NOT DETECTED

## 2021-11-21 PROCEDURE — 85027 COMPLETE CBC AUTOMATED: CPT | Performed by: INTERNAL MEDICINE

## 2021-11-21 PROCEDURE — 83735 ASSAY OF MAGNESIUM: CPT | Performed by: INTERNAL MEDICINE

## 2021-11-21 PROCEDURE — 99233 SBSQ HOSP IP/OBS HIGH 50: CPT | Performed by: INTERNAL MEDICINE

## 2021-11-21 PROCEDURE — 84100 ASSAY OF PHOSPHORUS: CPT | Performed by: HOSPITALIST

## 2021-11-21 PROCEDURE — 250N000013 HC RX MED GY IP 250 OP 250 PS 637: Performed by: INTERNAL MEDICINE

## 2021-11-21 PROCEDURE — 84100 ASSAY OF PHOSPHORUS: CPT | Performed by: INTERNAL MEDICINE

## 2021-11-21 PROCEDURE — 85379 FIBRIN DEGRADATION QUANT: CPT | Performed by: INTERNAL MEDICINE

## 2021-11-21 PROCEDURE — 250N000009 HC RX 250: Performed by: INTERNAL MEDICINE

## 2021-11-21 PROCEDURE — 83735 ASSAY OF MAGNESIUM: CPT | Performed by: HOSPITALIST

## 2021-11-21 PROCEDURE — 36592 COLLECT BLOOD FROM PICC: CPT | Performed by: HOSPITALIST

## 2021-11-21 PROCEDURE — 258N000003 HC RX IP 258 OP 636: Performed by: INTERNAL MEDICINE

## 2021-11-21 PROCEDURE — 250N000013 HC RX MED GY IP 250 OP 250 PS 637: Performed by: PHYSICIAN ASSISTANT

## 2021-11-21 PROCEDURE — 206N000001 HC R&B BMT UMMC

## 2021-11-21 PROCEDURE — 250N000011 HC RX IP 250 OP 636: Performed by: PHYSICIAN ASSISTANT

## 2021-11-21 PROCEDURE — 80048 BASIC METABOLIC PNL TOTAL CA: CPT | Performed by: HOSPITALIST

## 2021-11-21 PROCEDURE — 250N000011 HC RX IP 250 OP 636: Performed by: INTERNAL MEDICINE

## 2021-11-21 PROCEDURE — 250N000013 HC RX MED GY IP 250 OP 250 PS 637: Performed by: HOSPITALIST

## 2021-11-21 PROCEDURE — 84132 ASSAY OF SERUM POTASSIUM: CPT | Performed by: INTERNAL MEDICINE

## 2021-11-21 PROCEDURE — 85610 PROTHROMBIN TIME: CPT | Performed by: HOSPITALIST

## 2021-11-21 PROCEDURE — 85384 FIBRINOGEN ACTIVITY: CPT | Performed by: INTERNAL MEDICINE

## 2021-11-21 PROCEDURE — 85730 THROMBOPLASTIN TIME PARTIAL: CPT | Performed by: HOSPITALIST

## 2021-11-21 PROCEDURE — 86900 BLOOD TYPING SEROLOGIC ABO: CPT | Performed by: INTERNAL MEDICINE

## 2021-11-21 RX ORDER — OXYCODONE HYDROCHLORIDE 5 MG/1
5 TABLET ORAL EVERY 4 HOURS PRN
Status: DISCONTINUED | OUTPATIENT
Start: 2021-11-21 | End: 2021-11-23 | Stop reason: HOSPADM

## 2021-11-21 RX ORDER — METRONIDAZOLE 500 MG/1
500 TABLET ORAL 3 TIMES DAILY
Status: DISCONTINUED | OUTPATIENT
Start: 2021-11-21 | End: 2021-11-23 | Stop reason: HOSPADM

## 2021-11-21 RX ORDER — NALOXONE HYDROCHLORIDE 0.4 MG/ML
0.2 INJECTION, SOLUTION INTRAMUSCULAR; INTRAVENOUS; SUBCUTANEOUS
Status: DISCONTINUED | OUTPATIENT
Start: 2021-11-21 | End: 2021-11-23 | Stop reason: HOSPADM

## 2021-11-21 RX ORDER — NALOXONE HYDROCHLORIDE 0.4 MG/ML
0.4 INJECTION, SOLUTION INTRAMUSCULAR; INTRAVENOUS; SUBCUTANEOUS
Status: DISCONTINUED | OUTPATIENT
Start: 2021-11-21 | End: 2021-11-23 | Stop reason: HOSPADM

## 2021-11-21 RX ORDER — POTASSIUM CHLORIDE 750 MG/1
40 TABLET, EXTENDED RELEASE ORAL ONCE
Status: COMPLETED | OUTPATIENT
Start: 2021-11-21 | End: 2021-11-21

## 2021-11-21 RX ORDER — HYDROMORPHONE HYDROCHLORIDE 1 MG/ML
0.3 INJECTION, SOLUTION INTRAMUSCULAR; INTRAVENOUS; SUBCUTANEOUS EVERY 4 HOURS PRN
Status: DISCONTINUED | OUTPATIENT
Start: 2021-11-21 | End: 2021-11-23 | Stop reason: HOSPADM

## 2021-11-21 RX ADMIN — B-COMPLEX W/ C & FOLIC ACID TAB 1 TABLET: TAB at 08:23

## 2021-11-21 RX ADMIN — CEFEPIME HYDROCHLORIDE 2 G: 2 INJECTION, POWDER, FOR SOLUTION INTRAVENOUS at 20:05

## 2021-11-21 RX ADMIN — METRONIDAZOLE 500 MG: 500 TABLET ORAL at 12:35

## 2021-11-21 RX ADMIN — DEXTROSE MONOHYDRATE 20 ML: 50 INJECTION, SOLUTION INTRAVENOUS at 19:21

## 2021-11-21 RX ADMIN — PANTOPRAZOLE SODIUM 40 MG: 40 TABLET, DELAYED RELEASE ORAL at 08:22

## 2021-11-21 RX ADMIN — POTASSIUM CHLORIDE 40 MEQ: 750 TABLET, EXTENDED RELEASE ORAL at 05:59

## 2021-11-21 RX ADMIN — GABAPENTIN 300 MG: 300 CAPSULE ORAL at 08:22

## 2021-11-21 RX ADMIN — Medication 1 TABLET: at 08:22

## 2021-11-21 RX ADMIN — METRONIDAZOLE 500 MG: 500 INJECTION, SOLUTION INTRAVENOUS at 06:00

## 2021-11-21 RX ADMIN — ACETAMINOPHEN 325 MG: 325 TABLET, FILM COATED ORAL at 08:21

## 2021-11-21 RX ADMIN — PROCHLORPERAZINE MALEATE 10 MG: 5 TABLET ORAL at 11:20

## 2021-11-21 RX ADMIN — CEFEPIME HYDROCHLORIDE 2 G: 2 INJECTION, POWDER, FOR SOLUTION INTRAVENOUS at 04:46

## 2021-11-21 RX ADMIN — CEFEPIME HYDROCHLORIDE 2 G: 2 INJECTION, POWDER, FOR SOLUTION INTRAVENOUS at 12:35

## 2021-11-21 RX ADMIN — GABAPENTIN 300 MG: 300 CAPSULE ORAL at 19:47

## 2021-11-21 RX ADMIN — METRONIDAZOLE 500 MG: 500 TABLET ORAL at 19:47

## 2021-11-21 RX ADMIN — ACYCLOVIR 800 MG: 800 TABLET ORAL at 08:22

## 2021-11-21 RX ADMIN — DEXTROSE MONOHYDRATE 20 ML: 50 INJECTION, SOLUTION INTRAVENOUS at 19:46

## 2021-11-21 RX ADMIN — Medication 5 ML: at 09:59

## 2021-11-21 RX ADMIN — FLUCONAZOLE 200 MG: 200 TABLET ORAL at 08:21

## 2021-11-21 RX ADMIN — ONDANSETRON HYDROCHLORIDE 8 MG: 8 TABLET, FILM COATED ORAL at 19:51

## 2021-11-21 RX ADMIN — ONDANSETRON HYDROCHLORIDE 8 MG: 8 TABLET, FILM COATED ORAL at 14:02

## 2021-11-21 RX ADMIN — Medication 350 MCG: at 19:24

## 2021-11-21 RX ADMIN — POTASSIUM CHLORIDE 20 MEQ: 750 TABLET, EXTENDED RELEASE ORAL at 08:22

## 2021-11-21 RX ADMIN — LEVOTHYROXINE SODIUM 50 MCG: 0.05 TABLET ORAL at 08:22

## 2021-11-21 RX ADMIN — POTASSIUM PHOSPHATE, MONOBASIC AND POTASSIUM PHOSPHATE, DIBASIC 15 MMOL: 224; 236 INJECTION, SOLUTION, CONCENTRATE INTRAVENOUS at 11:13

## 2021-11-21 RX ADMIN — ONDANSETRON HYDROCHLORIDE 8 MG: 8 TABLET, FILM COATED ORAL at 05:59

## 2021-11-21 RX ADMIN — OXYCODONE HYDROCHLORIDE 5 MG: 5 TABLET ORAL at 20:03

## 2021-11-21 RX ADMIN — OXYCODONE HYDROCHLORIDE 5 MG: 5 TABLET ORAL at 11:23

## 2021-11-21 RX ADMIN — POTASSIUM PHOSPHATE, MONOBASIC AND POTASSIUM PHOSPHATE, DIBASIC 15 MMOL: 224; 236 INJECTION, SOLUTION, CONCENTRATE INTRAVENOUS at 22:13

## 2021-11-21 RX ADMIN — ACYCLOVIR 800 MG: 800 TABLET ORAL at 19:48

## 2021-11-21 ASSESSMENT — ACTIVITIES OF DAILY LIVING (ADL)
ADLS_ACUITY_SCORE: 3

## 2021-11-21 NOTE — PLAN OF CARE
"/73 (BP Location: Left arm)   Pulse 104   Temp 97.7  F (36.5  C) (Oral)   Resp 16   Ht 1.651 m (5' 5\")   Wt 74.4 kg (164 lb)   SpO2 99%   BMI 27.29 kg/m    Neuro: A&Ox4.   Cardiac: Afebrile, tachycardic 100's. BP wdl.   Respiratory: RA. LS clear   GI/: Voiding spontaneously. No BM this shift.   Diet/appetite: Tolerating reg diet. Good po fluid intake. Denies nausea   Activity: Up independently in room.   Pain: . Denies   Skin: No new deficits noted.  Lines: piv sl'd. R CVC- one lumen sl'd other at tko btwn abx infusions. + blood return noted on each.  Replacement: K+ 3.3- 40 mEq K+ admin po per protocol. Lab recheck ordered.   Hgb 8.5; platelets 90- no transfusions needed per conditional order parameters.      Problem: Adult Inpatient Plan of Care  Goal: Readiness for Transition of Care  Outcome: No Change     Problem: Adjustment to Transplant (Stem Cell/Bone Marrow Transplant)  Goal: Optimal Coping with Transplant  Outcome: No Change     Problem: Diarrhea (Stem Cell/Bone Marrow Transplant)  Goal: Diarrhea Symptom Control  Outcome: No Change     Problem: Hematologic Alteration (Stem Cell/Bone Marrow Transplant)  Goal: Blood Counts Within Acceptable Range  Outcome: No Change     Rested btwn cares. Able to make needs known. Continue to monitor. Notify MD of nay changes/concerns.    "

## 2021-11-21 NOTE — PROGRESS NOTES
"BMT Progress Note     ID: Jitendrmagui Ronquillo is a 52 yo man D+10 s/p auto PBSCT for high risk MM. Re-admitted with neutropenic fevers, abdominal pain, nausea and diarrhea.      HPI: Feels much better today. Able to eat breakfast but still had some abdominal discomfort afterwards. Diarrhea is better than yesterday. No real nausea today. Mouth pain and throat pain is better. No further fevers since admission, remains on IV antibiotics.      ROS: 8 point ROS except as stated above in HPI     Physical Exam  Blood pressure 134/81, pulse 100, temperature 97.7  F (36.5  C), temperature source Oral, resp. rate 16, height 1.651 m (5' 5\"), weight 74.4 kg (164 lb), SpO2 100 %.    Wt Readings from Last 4 Encounters:   11/20/21 74.4 kg (164 lb)   11/19/21 72.1 kg (159 lb)   11/18/21 72.3 kg (159 lb 6.4 oz)   11/17/21 72.3 kg (159 lb 4.8 oz)     General: NAD,   Eyes: sclera anicteric   Nose/Mouth/Throat: OP dry, no ulcerations   Lungs: CTA bilaterally  Cardiovascular: RRR, no M/R/G   Abdominal/Rectal: +BS, soft, mild tenderness to palpation in the lower quadrants without guarding.   Lymphatics: No edema  Skin: No rashes  Neuro: A&O   Access: CVC site NT, no drainage.     Labs:  Lab Results   Component Value Date    WBC 0.3 (LL) 11/21/2021    ANEU 1.7 11/17/2021    HGB 8.5 (L) 11/21/2021    HCT 24.4 (L) 11/21/2021    PLT 30 (LL) 11/21/2021     11/21/2021    POTASSIUM 3.3 (L) 11/21/2021    CHLORIDE 106 11/21/2021    CO2 22 11/21/2021     (H) 11/21/2021    BUN 5 (L) 11/21/2021    CR 0.67 11/21/2021    MAG 2.0 11/20/2021    INR 1.23 (H) 11/21/2021    BILITOTAL 0.4 11/20/2021    AST 8 11/20/2021    ALT 9 11/20/2021    ALKPHOS 53 11/20/2021    PROTTOTAL 5.8 (L) 11/20/2021    ALBUMIN 2.8 (L) 11/20/2021       I have assessed all abnormal lab values for their clinical significance and any values considered clinically significant have been addressed in the assessment and plan.        A/P: Kurtisdorianmatthias Ronquillo is a 52 yo man " D+10 s/p auto PBSCT for high risk MM      1. BMT  - BMT doc/Coordinator: Miguelangel/Du Newsome; Lorelei/Christine  - Cell dose 6.99 x 10^6; gave all cells due to high risk disease  - GCSF started day +5, continue until ANC > 2500 for 2 consecutive days.      2. HEME/COAG  - Transfusion parameters: hgb <7g/dL and plts <10,000.  - pancytopenic due to chemo/transplant. WBC up to 0.3 today.   - prolonged INR/PTT- will obtain fibrinogen and d-dimer to further eval.      3. ID.   Neutropenic fever 11/20: CT consistent with colitis. On cefepime and flagyl. Now afebrile.   - s/p vanco given in ED. COVID and CXR neg. Continue cefepime.  - Bld cx ngtd.  - stool studies neg.   - RVP neg    -Pending: abdominal CT, RVP, RSV, C diff, throat Cx, UA, Ucx,     Prophy:  Fluc, Levo (hold with cefepime), ACV. Bactrim for PCP prophy to start at day +28     4. GI/NUTRITION  - Colitis on CT 11/20. Management as above.   - DAYNA: schedule Zofran, compazine prn.    - loose stools, cdiff neg. 11/16. Imodium prn. Repeat c-diff and enteric panel.   - Ulcer prophy: Protonix 40mg daily.      5. CV  - Hx of CAD, MI at age 28 with stent placed  - HTN: cont losartan. Hold hydrochlorothiazide through transplant. Hold on admission d/t decreased oral intake.   - Hyperlipidemia: hold statin through transplant     6 RENAL/FEN  - Cr stable  - Hyponatremia: likely d/t poor oral intake. 1L NS given in the ED. Resolved.   - Replete lytes per sliding scale      7. ENDOCRINE  - hx hypothyroidism  - new diagnosis of papillary thyroid carcinoma per bx result from 11/2, appears that the plan is for thyroidectomy after BMT     8. Neuro  - peripheral neuropathy: gabapentin 300mg BID.  His feet felt worse on higher dose, he reports.   - start B complex vitamin (contains vit C) about 2 weeks post transplant to avoid interaction with chemo     Dispo: Pt will remain admitted through engraftment and work up/management of neutropenic fevers. Anticipate 4-5 days.     Shukri Moffett  LÓPEZ  [phone]    Attending Summary  The patient was seen and examined by me separate from the midlevel provider.The note above reflects my assessment and plan. I have personally reviewed today's labs,vital and radiology results. The points of care that were added by me are:     History and physical  June Ronquillo is a 50 yo man D+10 s/p auto PBSCT for high risk MM. Re-admitted with neutropenic fevers, abdominal pain, nausea and diarrhea.     Overall, feels better today. Still reports some abdominal pain.    On exam:  Head/mouth/neck: Oropharynx clear.  No lymphadenopathy.  Heart: Regular rate and rhythm.  No murmurs rubs or gallops.  Lungs: Lungs are clear bilaterally.  Abdomen: Abdomen is soft, nondistended.  Intact bowel sounds.  Ext: No edema.  Skin: No rash.    Pertinent Labs:  Reviewed in full. COVID negative, influenza A/B negative, and RSV negative.    ASSESSMENT AND PLAN  1.  MM: Day +10 s/p autoHCT  2.  Neutropenic fever: CT consistent with enteritis/colitis. Continue empiric antibiotics. Following cultures.    Dominguez Gutierrez MD

## 2021-11-21 NOTE — PLAN OF CARE
VSS,slightly tachy ,abdominal and throat pain controled with oxycodone 5 mg given one time ,pt. c/o nausea, compazine 10 mg PO given and scheduled Zofran,pt. has fair appetite reported x 4 loose BM's and adequate urinary output,phosphorous 0.9 replacement is infusing  PA notified,phosphorous  recheck tonight at 1915.Will continue to monitor.

## 2021-11-21 NOTE — PLAN OF CARE
Pt is afebrile, vital signs are stable; complained of abdominal discomfort and got Ativan 1 mg iv and feeling better; got plt transfusion for a count  of 5K; stool sent for C.Diff and result was negative; iv at TKO; monitor closely and continue plan of care.    Problem: Adult Inpatient Plan of Care  Goal: Plan of Care Review  Outcome: No Change     Problem: Adult Inpatient Plan of Care  Goal: Patient-Specific Goal (Individualized)  Outcome: No Change     Problem: Adult Inpatient Plan of Care  Goal: Absence of Hospital-Acquired Illness or Injury  Intervention: Identify and Manage Fall Risk  Recent Flowsheet Documentation  Taken 11/20/2021 1600 by Flori Bear, RN  Safety Promotion/Fall Prevention: assistive device/personal items within reach     Problem: Adult Inpatient Plan of Care  Goal: Absence of Hospital-Acquired Illness or Injury  Intervention: Prevent Skin Injury  Recent Flowsheet Documentation  Taken 11/20/2021 1600 by Flori Bear, RN  Body Position: position changed independently     Problem: Adult Inpatient Plan of Care  Goal: Absence of Hospital-Acquired Illness or Injury  Intervention: Prevent and Manage VTE (Venous Thromboembolism) Risk  Recent Flowsheet Documentation  Taken 11/20/2021 1600 by Flori Bear, RN  VTE Prevention/Management: ambulation promoted

## 2021-11-22 ENCOUNTER — HOME INFUSION (PRE-WILLOW HOME INFUSION) (OUTPATIENT)
Dept: PHARMACY | Facility: CLINIC | Age: 51
End: 2021-11-22
Payer: COMMERCIAL

## 2021-11-22 LAB
ABO/RH(D): ABNORMAL
ALBUMIN SERPL-MCNC: 2.4 G/DL (ref 3.4–5)
ALP SERPL-CCNC: 51 U/L (ref 40–150)
ALT SERPL W P-5'-P-CCNC: 9 U/L (ref 0–70)
ANION GAP SERPL CALCULATED.3IONS-SCNC: 6 MMOL/L (ref 3–14)
ANTIBODY SCREEN: POSITIVE
APTT PPP: 41 SECONDS (ref 22–38)
AST SERPL W P-5'-P-CCNC: 5 U/L (ref 0–45)
BACTERIA SPEC CULT: NORMAL
BASOPHILS # BLD MANUAL: 0 10E3/UL (ref 0–0.2)
BASOPHILS NFR BLD MANUAL: 1 %
BILIRUB DIRECT SERPL-MCNC: <0.1 MG/DL (ref 0–0.2)
BILIRUB SERPL-MCNC: 0.2 MG/DL (ref 0.2–1.3)
BUN SERPL-MCNC: 5 MG/DL (ref 7–30)
CALCIUM SERPL-MCNC: 7.3 MG/DL (ref 8.5–10.1)
CHLORIDE BLD-SCNC: 107 MMOL/L (ref 94–109)
CO2 SERPL-SCNC: 22 MMOL/L (ref 20–32)
CREAT SERPL-MCNC: 0.7 MG/DL (ref 0.66–1.25)
CULTURE HARVEST COMPLETE DATE: NORMAL
ELLIPTOCYTES BLD QL SMEAR: SLIGHT
EOSINOPHIL # BLD MANUAL: 0 10E3/UL (ref 0–0.7)
EOSINOPHIL NFR BLD MANUAL: 0 %
ERYTHROCYTE [DISTWIDTH] IN BLOOD BY AUTOMATED COUNT: 14.4 % (ref 10–15)
GFR SERPL CREATININE-BSD FRML MDRD: >90 ML/MIN/1.73M2
GLUCOSE BLD-MCNC: 105 MG/DL (ref 70–99)
HCT VFR BLD AUTO: 26.5 % (ref 40–53)
HGB BLD-MCNC: 9.2 G/DL (ref 13.3–17.7)
IGG SERPL-MCNC: 287 MG/DL (ref 610–1616)
INR PPP: 1.13 (ref 0.85–1.15)
LACTATE SERPL-SCNC: 1.9 MMOL/L (ref 0.7–2)
LYMPHOCYTES # BLD MANUAL: 0.4 10E3/UL (ref 0.8–5.3)
LYMPHOCYTES NFR BLD MANUAL: 28 %
MAGNESIUM SERPL-MCNC: 2 MG/DL (ref 1.6–2.3)
MCH RBC QN AUTO: 31 PG (ref 26.5–33)
MCHC RBC AUTO-ENTMCNC: 34.7 G/DL (ref 31.5–36.5)
MCV RBC AUTO: 89 FL (ref 78–100)
MONOCYTES # BLD MANUAL: 0.3 10E3/UL (ref 0–1.3)
MONOCYTES NFR BLD MANUAL: 21 %
NEUTROPHILS # BLD MANUAL: 0.8 10E3/UL (ref 1.6–8.3)
NEUTROPHILS NFR BLD MANUAL: 50 %
PHOSPHATE SERPL-MCNC: 0.9 MG/DL (ref 2.5–4.5)
PLAT MORPH BLD: ABNORMAL
PLATELET # BLD AUTO: 26 10E3/UL (ref 150–450)
POTASSIUM BLD-SCNC: 3.4 MMOL/L (ref 3.4–5.3)
POTASSIUM BLD-SCNC: 3.7 MMOL/L (ref 3.4–5.3)
PROT SERPL-MCNC: 5.3 G/DL (ref 6.8–8.8)
RBC # BLD AUTO: 2.97 10E6/UL (ref 4.4–5.9)
RBC MORPH BLD: ABNORMAL
SODIUM SERPL-SCNC: 135 MMOL/L (ref 133–144)
SPECIMEN EXPIRATION DATE: ABNORMAL
WBC # BLD AUTO: 1.5 10E3/UL (ref 4–11)

## 2021-11-22 PROCEDURE — 86922 COMPATIBILITY TEST ANTIGLOB: CPT

## 2021-11-22 PROCEDURE — 250N000011 HC RX IP 250 OP 636: Performed by: PHYSICIAN ASSISTANT

## 2021-11-22 PROCEDURE — 250N000009 HC RX 250: Performed by: INTERNAL MEDICINE

## 2021-11-22 PROCEDURE — 999N000111 HC STATISTIC OT IP EVAL DEFER: Performed by: OCCUPATIONAL THERAPIST

## 2021-11-22 PROCEDURE — 250N000013 HC RX MED GY IP 250 OP 250 PS 637: Performed by: INTERNAL MEDICINE

## 2021-11-22 PROCEDURE — 85027 COMPLETE CBC AUTOMATED: CPT | Performed by: INTERNAL MEDICINE

## 2021-11-22 PROCEDURE — 85730 THROMBOPLASTIN TIME PARTIAL: CPT | Performed by: STUDENT IN AN ORGANIZED HEALTH CARE EDUCATION/TRAINING PROGRAM

## 2021-11-22 PROCEDURE — 99233 SBSQ HOSP IP/OBS HIGH 50: CPT | Performed by: INTERNAL MEDICINE

## 2021-11-22 PROCEDURE — 250N000011 HC RX IP 250 OP 636: Performed by: INTERNAL MEDICINE

## 2021-11-22 PROCEDURE — 206N000001 HC R&B BMT UMMC

## 2021-11-22 PROCEDURE — 84132 ASSAY OF SERUM POTASSIUM: CPT | Performed by: INTERNAL MEDICINE

## 2021-11-22 PROCEDURE — 83735 ASSAY OF MAGNESIUM: CPT | Performed by: INTERNAL MEDICINE

## 2021-11-22 PROCEDURE — 85610 PROTHROMBIN TIME: CPT | Performed by: INTERNAL MEDICINE

## 2021-11-22 PROCEDURE — 82248 BILIRUBIN DIRECT: CPT | Performed by: INTERNAL MEDICINE

## 2021-11-22 PROCEDURE — 83605 ASSAY OF LACTIC ACID: CPT | Performed by: INTERNAL MEDICINE

## 2021-11-22 PROCEDURE — 999N000147 HC STATISTIC PT IP EVAL DEFER

## 2021-11-22 PROCEDURE — 80053 COMPREHEN METABOLIC PANEL: CPT | Performed by: INTERNAL MEDICINE

## 2021-11-22 PROCEDURE — 84100 ASSAY OF PHOSPHORUS: CPT | Performed by: INTERNAL MEDICINE

## 2021-11-22 PROCEDURE — 258N000003 HC RX IP 258 OP 636: Performed by: INTERNAL MEDICINE

## 2021-11-22 PROCEDURE — 250N000013 HC RX MED GY IP 250 OP 250 PS 637: Performed by: STUDENT IN AN ORGANIZED HEALTH CARE EDUCATION/TRAINING PROGRAM

## 2021-11-22 RX ORDER — CIPROFLOXACIN 500 MG/1
500 TABLET, FILM COATED ORAL EVERY 12 HOURS
Qty: 7 TABLET | Refills: 0 | Status: SHIPPED | OUTPATIENT
Start: 2021-11-22 | End: 2021-11-29

## 2021-11-22 RX ORDER — POTASSIUM CHLORIDE 750 MG/1
40 TABLET, EXTENDED RELEASE ORAL ONCE
Status: COMPLETED | OUTPATIENT
Start: 2021-11-22 | End: 2021-11-22

## 2021-11-22 RX ORDER — METRONIDAZOLE 500 MG/1
500 TABLET ORAL 3 TIMES DAILY
Qty: 3 TABLET | Refills: 0 | Status: SHIPPED | OUTPATIENT
Start: 2021-11-22 | End: 2021-11-29

## 2021-11-22 RX ORDER — LOPERAMIDE HCL 2 MG
2 CAPSULE ORAL 3 TIMES DAILY PRN
Status: DISCONTINUED | OUTPATIENT
Start: 2021-11-22 | End: 2021-11-23 | Stop reason: HOSPADM

## 2021-11-22 RX ORDER — CIPROFLOXACIN 500 MG/1
500 TABLET, FILM COATED ORAL EVERY 12 HOURS SCHEDULED
Status: DISCONTINUED | OUTPATIENT
Start: 2021-11-22 | End: 2021-11-23 | Stop reason: HOSPADM

## 2021-11-22 RX ADMIN — Medication 350 MCG: at 20:10

## 2021-11-22 RX ADMIN — SODIUM PHOSPHATE, MONOBASIC, MONOHYDRATE AND SODIUM PHOSPHATE, DIBASIC, ANHYDROUS 15 MMOL: 276; 142 INJECTION, SOLUTION INTRAVENOUS at 13:06

## 2021-11-22 RX ADMIN — DEXTROSE MONOHYDRATE 20 ML: 50 INJECTION, SOLUTION INTRAVENOUS at 20:11

## 2021-11-22 RX ADMIN — POTASSIUM CHLORIDE 20 MEQ: 750 TABLET, EXTENDED RELEASE ORAL at 08:52

## 2021-11-22 RX ADMIN — ONDANSETRON HYDROCHLORIDE 8 MG: 8 TABLET, FILM COATED ORAL at 20:11

## 2021-11-22 RX ADMIN — LOPERAMIDE HYDROCHLORIDE 2 MG: 2 CAPSULE ORAL at 15:01

## 2021-11-22 RX ADMIN — CEFEPIME HYDROCHLORIDE 2 G: 2 INJECTION, POWDER, FOR SOLUTION INTRAVENOUS at 04:16

## 2021-11-22 RX ADMIN — Medication 1 TABLET: at 08:52

## 2021-11-22 RX ADMIN — POTASSIUM CHLORIDE 40 MEQ: 750 TABLET, EXTENDED RELEASE ORAL at 05:36

## 2021-11-22 RX ADMIN — B-COMPLEX W/ C & FOLIC ACID TAB 1 TABLET: TAB at 08:51

## 2021-11-22 RX ADMIN — METRONIDAZOLE 500 MG: 500 TABLET ORAL at 08:52

## 2021-11-22 RX ADMIN — ACETAMINOPHEN 650 MG: 325 TABLET, FILM COATED ORAL at 23:58

## 2021-11-22 RX ADMIN — METRONIDAZOLE 500 MG: 500 TABLET ORAL at 13:06

## 2021-11-22 RX ADMIN — LEVOTHYROXINE SODIUM 50 MCG: 0.05 TABLET ORAL at 08:52

## 2021-11-22 RX ADMIN — ONDANSETRON HYDROCHLORIDE 8 MG: 8 TABLET, FILM COATED ORAL at 04:29

## 2021-11-22 RX ADMIN — PANTOPRAZOLE SODIUM 40 MG: 40 TABLET, DELAYED RELEASE ORAL at 08:52

## 2021-11-22 RX ADMIN — GABAPENTIN 300 MG: 300 CAPSULE ORAL at 20:12

## 2021-11-22 RX ADMIN — DEXTROSE MONOHYDRATE 20 ML: 50 INJECTION, SOLUTION INTRAVENOUS at 20:09

## 2021-11-22 RX ADMIN — GABAPENTIN 300 MG: 300 CAPSULE ORAL at 08:52

## 2021-11-22 RX ADMIN — ONDANSETRON HYDROCHLORIDE 8 MG: 8 TABLET, FILM COATED ORAL at 13:06

## 2021-11-22 RX ADMIN — FLUCONAZOLE 200 MG: 200 TABLET ORAL at 08:52

## 2021-11-22 RX ADMIN — ACYCLOVIR 800 MG: 800 TABLET ORAL at 20:12

## 2021-11-22 RX ADMIN — CIPROFLOXACIN HYDROCHLORIDE 500 MG: 500 TABLET, FILM COATED ORAL at 20:12

## 2021-11-22 RX ADMIN — CIPROFLOXACIN HYDROCHLORIDE 500 MG: 500 TABLET, FILM COATED ORAL at 10:05

## 2021-11-22 RX ADMIN — METRONIDAZOLE 500 MG: 500 TABLET ORAL at 20:12

## 2021-11-22 RX ADMIN — SODIUM PHOSPHATE, MONOBASIC, MONOHYDRATE AND SODIUM PHOSPHATE, DIBASIC, ANHYDROUS 15 MMOL: 276; 142 INJECTION, SOLUTION INTRAVENOUS at 16:32

## 2021-11-22 RX ADMIN — LOPERAMIDE HYDROCHLORIDE 2 MG: 2 CAPSULE ORAL at 18:47

## 2021-11-22 RX ADMIN — ACYCLOVIR 800 MG: 800 TABLET ORAL at 08:52

## 2021-11-22 ASSESSMENT — ACTIVITIES OF DAILY LIVING (ADL)
ADLS_ACUITY_SCORE: 3

## 2021-11-22 ASSESSMENT — MIFFLIN-ST. JEOR: SCORE: 1497.64

## 2021-11-22 NOTE — PLAN OF CARE
Occupational Therapy: Orders received. Chart reviewed and discussed with care team.? Occupational Therapy not indicated due to patient being independent with all mobility in room and demonstrating understanding of HEP.  Pt reports being active and completing ADL on his own, reporting no concerns with ADL or activity at this time.? Defer discharge recommendations to medical team, anticipate patient will be safe to return to prior living arrangement.? Will complete orders.

## 2021-11-22 NOTE — PROGRESS NOTES
"BMT Progress Note     ID: June Ronquillo is a 50 yo man D+11 s/p auto PBSCT for high risk MM. Re-admitted with neutropenic fevers, abdominal pain, nausea and diarrhea.      HPI: June is feeling well today, still \"queasey\" but able to eat full dinner, good breakfast. No pain or vomiting. Maybe 6 loose stools yesterday. No cough or cold symptoms. No bleeding. Still intermittent headache, right sided. No photophobia, no new or worsening pain. 2-4/10 on pain scale.     ROS: 8 point ROS except as stated above in HPI     Physical Exam  Blood pressure 123/77, pulse 91, temperature 97.8  F (36.6  C), temperature source Oral, resp. rate 18, height 1.651 m (5' 5\"), weight 74.4 kg (164 lb), SpO2 99 %.    Wt Readings from Last 4 Encounters:   11/20/21 74.4 kg (164 lb)   11/19/21 72.1 kg (159 lb)   11/18/21 72.3 kg (159 lb 6.4 oz)   11/17/21 72.3 kg (159 lb 4.8 oz)     General: NAD,   Eyes: sclera anicteric   Nose/Mouth/Throat: OP dry, no ulcerations   Lungs: CTA bilaterally  Cardiovascular: RRR, no M/R/G   Abdominal/Rectal: +BS, soft, no tenderness to palpation  Lymphatics: No edema  Skin: No rashes  Neuro: A&O   Access: CVC site NT, no drainage.     Labs:  Lab Results   Component Value Date    WBC 1.5 (L) 11/22/2021    ANEU 0.8 (L) 11/22/2021    HGB 9.2 (L) 11/22/2021    HCT 26.5 (L) 11/22/2021    PLT 26 (LL) 11/22/2021     11/22/2021    POTASSIUM 3.4 11/22/2021    CHLORIDE 107 11/22/2021    CO2 22 11/22/2021     (H) 11/22/2021    BUN 5 (L) 11/22/2021    CR 0.70 11/22/2021    MAG 2.0 11/22/2021    INR 1.13 11/22/2021    BILITOTAL 0.2 11/22/2021    AST 5 11/22/2021    ALT 9 11/22/2021    ALKPHOS 51 11/22/2021    PROTTOTAL 5.3 (L) 11/22/2021    ALBUMIN 2.4 (L) 11/22/2021       I have assessed all abnormal lab values for their clinical significance and any values considered clinically significant have been addressed in the assessment and plan.        A/P: June Ronquillo is a 50 yo man D+11 s/p auto " PBSCT for high risk MM      1. BMT  - BMT doc/Coordinator: Miguelangel/Du Newsome; Lorelei/Christine  - Cell dose 6.99 x 10^6; gave all cells due to high risk disease  - GCSF started day +5, continue until ANC > 2500 for 2 consecutive days.      2. HEME/COAG  - Transfusion parameters: hgb <7g/dL and plts <10,000..   - pancytopenic but not needing transfusions yet. Now neutropenic.     3. ID.   Neutropenic fever 11/20: vanco given in ED. COVID and CXR neg. 11/22 Change cefepime to cipro (oral) and cont oral flagyl. Micro all ngtd: urine, RVP, rapid strep, blood cx  Prophy:  Fluc, Levo (hold with cefepime), ACV. Bactrim for PCP prophy to start at day +28     4. GI/NUTRITION  - Abdominal discomfort: CT A/P final read suggestive infectious/inflammatory enteritis/colitis. Continues on oral antibiotics cipro/flagyl through 11/26.   - DAYNA: schedule Zofran, compazine prn.    - loose stools, cdiff neg. 11/16. Imodium prn. Repeat c-diff on admission.   - Ulcer prophy: Protonix 40mg daily.      5. CV  - Hx of CAD, MI at age 28 with stent placed  - HTN: cont losartan. Hold hydrochlorothiazide through transplant. Hold on admission d/t decreased oral intake. Consider resuming outpt   - Hyperlipidemia: hold statin through transplant     6 RENAL/FEN  - Cr stable  - Hyponatremia: likely d/t poor oral intake. 1L NS given in the ED  - Hypophosphatemia, likely secondary to engraftment, less likely refeeding but still possible. Plan to replace in clinic and start neutraphos packets on discharge.     7. ENDOCRINE  - hx hypothyroidism  - new diagnosis of papillary thyroid carcinoma per bx result from 11/2, appears that the plan is for thyroidectomy after BMT     8. Neuro  - peripheral neuropathy: gabapentin 300mg BID.  His feet felt worse on higher dose, he reports.   - start B complex vitamin (contains vit C) about 2 weeks post transplant to avoid interaction with chemo        Dispo: As he engrafts and antibiotics moved to oral, anticipate discharge  11/23    Deborah [facility]  [phone]    Attending Summary  The patient was seen and examined by me separate from the midlevel provider.The note above reflects my assessment and plan. I have personally reviewed today's labs,vital and radiology results. The points of care that were added by me are:     History and physical  June Ronquillo is a 50 yo man D+11 s/p auto PBSCT for high risk MM. Re-admitted with neutropenic fevers, abdominal pain, nausea and diarrhea.     Overall, much improved. Afebrile. Engrafted    On exam:  Head/mouth/neck: Oropharynx clear.  No lymphadenopathy.  Heart: Regular rate and rhythm.  No murmurs rubs or gallops.  Lungs: Lungs are clear bilaterally.  Abdomen: Abdomen is soft, nondistended.  Intact bowel sounds.  Ext: No edema.  Skin: No rash.    Pertinent Labs:  Reviewed in full. COVID negative, influenza A/B negative, and RSV negative.    ASSESSMENT AND PLAN  1.  MM: Day +11 s/p autoHCT. Engrafted.  2.  Neutropenic fever: CT consistent with enteritis/colitis. Will complete antibiotic course orally.   3. Dispo: Plan on discharge tomorrow.    Dominguez Gutierrez MD

## 2021-11-22 NOTE — PLAN OF CARE
5C PT: defer: Orders received and appreciated. Per discussion with OT and thorough chart review, no IP PT needs indicated. Pt mobilizing at baseline, denies changes in functional status. OT to assist with discharge planning, PT to complete orders and sign off.

## 2021-11-22 NOTE — PLAN OF CARE
"/77 (BP Location: Left arm)   Pulse 91   Temp 97.8  F (36.6  C) (Oral)   Resp 18   Ht 1.651 m (5' 5\")   Wt 74.4 kg (164 lb)   SpO2 99%   BMI 27.29 kg/m    Neuro: A&Ox4.   Cardiac: Afebrile, VSS.   Respiratory: RA   GI/: Voiding spontaneously. No BM this shift.   Diet/appetite: Tolerating reg diet. Nausea controlled with scheduled zofran, no prn antiemetics admin.   Activity: Up independently in room    Pain: . Denies   Skin: No new deficits noted.  Lines: dbl CVC. + blood return noted. Both lines at tko btwn use.,  Replacement: phos replacement completed- level to be rechecked at 0800. K= replacement admin- level to be rechecked at 10am. Mag wdl- recheck lab ordered for tomorrow am. Hbg 9.2; platelets 26- no transfusions needed per conditional order parameters.     BLOOD BANK notification- antibody screen Positive. Moonlighter notified.      Problem: Adult Inpatient Plan of Care  Goal: Readiness for Transition of Care  Outcome: No Change     Problem: Adjustment to Transplant (Stem Cell/Bone Marrow Transplant)  Goal: Optimal Coping with Transplant  Outcome: No Change     Problem: Fatigue (Stem Cell/Bone Marrow Transplant)  Goal: Energy Level Supports Daily Activity  Outcome: No Change     Problem: Hematologic Alteration (Stem Cell/Bone Marrow Transplant)  Goal: Blood Counts Within Acceptable Range  Outcome: No Change     Problem: Infection Risk (Stem Cell/Bone Marrow Transplant)  Goal: Absence of Infection  Outcome: No Change     Rested btwn cares. Able to make needs known. Continue to monitor. Notify MD of changes/concerns    "

## 2021-11-22 NOTE — PLAN OF CARE
Afebrile. VSS. Patient complained of nausea, given scheduled zofran.  Denies pain.  Up independently.  Voiding without issue.  Diarrhea x's 4-5 per patient, agreed to trying imodium, given x's 1.  Eating about 50-75% of meals.  K+ recheck was 3.7, no replacements needed.  Phos was 0.9, MD and Charge RN notified, currently running first bag of 15 mmol replacement, still needs one more and a recheck 4 hours afterwards.  Triggered lactic acid, came back 1.9.  Worked out in room.  Plans to shower this evening when wife  is her.  Continue to monitor.     Problem: Adult Inpatient Plan of Care  Goal: Plan of Care Review  Outcome: No Change     Problem: Adult Inpatient Plan of Care  Goal: Patient-Specific Goal (Individualized)  Outcome: No Change     Problem: Adult Inpatient Plan of Care  Goal: Absence of Hospital-Acquired Illness or Injury  Outcome: No Change     Problem: Adult Inpatient Plan of Care  Goal: Optimal Comfort and Wellbeing  Outcome: No Change     Problem: Adult Inpatient Plan of Care  Goal: Readiness for Transition of Care  Outcome: No Change     Problem: Adjustment to Transplant (Stem Cell/Bone Marrow Transplant)  Goal: Optimal Coping with Transplant  Outcome: No Change     Problem: Diarrhea (Stem Cell/Bone Marrow Transplant)  Goal: Diarrhea Symptom Control  Outcome: No Change     Problem: Fatigue (Stem Cell/Bone Marrow Transplant)  Goal: Energy Level Supports Daily Activity  Outcome: No Change     Problem: Hematologic Alteration (Stem Cell/Bone Marrow Transplant)  Goal: Blood Counts Within Acceptable Range  Outcome: No Change     Problem: Hypersensitivity Reaction (Stem Cell/Bone Marrow Transplant)  Goal: Absence of Hypersensitivity Reaction  Outcome: No Change     Problem: Infection Risk (Stem Cell/Bone Marrow Transplant)  Goal: Absence of Infection  Outcome: No Change

## 2021-11-22 NOTE — PLAN OF CARE
Pt is afebrile, tachy and Bp 143/91 but is down now to 136/87; pt complained of feeling tired; took Oxycodone for pain/headache; Phos was 1.2 and it is being replaced by 15 mmol; he had 2 loose stools this shift; monitor closely and continue plan of care.    Problem: Adult Inpatient Plan of Care  Goal: Plan of Care Review  Outcome: No Change     Problem: Adult Inpatient Plan of Care  Goal: Absence of Hospital-Acquired Illness or Injury  Intervention: Identify and Manage Fall Risk  Recent Flowsheet Documentation  Taken 11/21/2021 1540 by Flori Bear, RN  Safety Promotion/Fall Prevention: activity supervised     Problem: Adult Inpatient Plan of Care  Goal: Absence of Hospital-Acquired Illness or Injury  Intervention: Prevent Skin Injury  Recent Flowsheet Documentation  Taken 11/21/2021 1540 by Flori Bear, RN  Body Position: position changed independently     Problem: Adult Inpatient Plan of Care  Goal: Absence of Hospital-Acquired Illness or Injury  Intervention: Prevent and Manage VTE (Venous Thromboembolism) Risk  Recent Flowsheet Documentation  Taken 11/21/2021 1540 by Flori Bear, RN  VTE Prevention/Management: ambulation promoted     Problem: Diarrhea (Stem Cell/Bone Marrow Transplant)  Goal: Diarrhea Symptom Control  Outcome: No Change     Problem: Fatigue (Stem Cell/Bone Marrow Transplant)  Goal: Energy Level Supports Daily Activity  Outcome: No Change

## 2021-11-22 NOTE — SUMMARY OF CARE
BMT Summary of Care    November 22, 2021 10:10 AM  June Ronquillo  MRN: [id number]    Discharge Date: 11/23/2021     BMT Primary Physician: Dr Nasim Means    BMT Nurse Coordinator: Christine Reynolds RN    Discharge Diagnosis:     Discharge To: home    Activity: pancytopenia precautions     Catheter Care: [place] - Flush each line with 5mL of 10 unit/mL heparin every 24 hours    Vascular Access Device Protocol Per Policy  Supplies through home infusion  [facility]  Fax: [phone]  Ph: [phone]     IV Medications through home infusion: None    Nutrition: Regular diet as tolerated    Blood Transfusions:  Transfuse if Hemoglobin < or equal 7 mg/dL  Transfuse if Platelet count < or equal 10,000 uL  Transfusion Pre-meds:  None    Intravenous Electrolyte Replacement:  Potassium  chloride (give only if serum creatinine < 2)     3-3.3  20mEq/hr  over 1 hour x 2 doses     <3      20mEq/hr  over 1 hour x 3 doses  Magnesium sulfate 1.3-1.7     2 grams over 1 hour x1 dose                                     <1.3         2 grams over 2 hours x1 dose    Outpatient Pharmacy:  G-CSF to be given in clinic: (dose) 480mcg daily until ANC >2500 x2 days    Laboratory Tests:  At next clinic appointment (date: 11/24/2021)  Hemogram (CBC) differential, platelet count  Basic Metabolic Panel  Phosphorous    Support Services:  None    Appointments:   BMT Clinic (date, time, provider):   1. Wednesday, 11/24 check in at 11:45am for labs and 12:30 provider visit with infusion for IV electrolytes as needed    JUANITA JamaC      BMT Contact Information:-   For issues including fevers 100.5 or more:  Please call during the week: Monday through Friday between hours of 8:00 am and 4:30 pm- Call BMT office- [phone]  After hours/Weekends: Please call [facility] : [phone] and ask for BMT physician on call and the  will have the BMT physician call you back.

## 2021-11-22 NOTE — DISCHARGE SUMMARY
[facility] Discharge Summary   June Ronquillo MRN# [id number]   Age: 51 year old  YOB: 1970   Date of Admission: 11/20/2021  Date of Discharge:  11/23/2021  Admitting Physician: Aidan Carroll MD  Discharge Physician:  Dominguez Gutierrez MD  Discharge Diagnoses:    1. S/p autologous BMT for multiple myeloma  2. Peripheral neuropathy  3. Neutropenic fever  4. Diarrhea  5. Colitis  6. Abdominal pain  7. Hypertension  8. Chemotherapy induced nausea  9. Headache   10. Mild inflammatory sinusitis  11. Hypokalemia  12. Hypophosphatemia  Discharge Medications:         Review of your medicines      START taking      Dose / Directions   ciprofloxacin 500 MG tablet  Commonly known as: CIPRO  Indication: Infection Within the Abdomen  Used for: Multiple myeloma not having achieved remission (H), Autologous donor, stem cells      Dose: 500 mg  Take 1 tablet (500 mg) by mouth every 12 hours  Quantity: 7 tablet  Refills: 0     metroNIDAZOLE 500 MG tablet  Commonly known as: FLAGYL  Indication: Infection Within the Abdomen  Used for: Multiple myeloma not having achieved remission (H), Autologous donor, stem cells      Dose: 500 mg  Take 1 tablet (500 mg) by mouth 3 times daily  Quantity: 3 tablet  Refills: 0     potassium & sodium phosphates 280-160-250 MG Packet  Commonly known as: NEUTRA-PHOS  Used for: Multiple myeloma not having achieved remission (H), Hypothyroidism due to Hashimoto's thyroiditis      Dose: 1 packet  Take 1 packet by mouth 4 times daily  Quantity: 20 packet  Refills: 0        CONTINUE these medicines which have NOT CHANGED      Dose / Directions   acyclovir 800 MG tablet  Commonly known as: ZOVIRAX  Indication: Varicella Zoster prophylaxis  Used for: Multiple myeloma not having achieved remission (H)      Dose: 800 mg  Take 1 tablet (800 mg) by mouth 2 times daily  Quantity: 60 tablet  Refills: 3     calcium carb-cholecalciferol 500-200 MG-UNIT tablet  Commonly known as: OS-HARDEEP  Used  for: Multiple myeloma not having achieved remission (H)      Dose: 1 tablet  Take 1 tablet by mouth daily  Quantity: 30 tablet  Refills: 1     fluconazole 200 MG tablet  Commonly known as: DIFLUCAN  Indication: Candidiasis Prophylaxis  Used for: Multiple myeloma not having achieved remission (H)      Dose: 200 mg  Take 1 tablet (200 mg) by mouth daily  Quantity: 30 tablet  Refills: 1     gabapentin 300 MG capsule  Commonly known as: NEURONTIN  Used for: Multiple myeloma not having achieved remission (H)      Dose: 300 mg  Take 1 capsule (300 mg) by mouth 2 times daily  Quantity: 90 capsule  Refills: 1     levothyroxine 50 MCG tablet  Commonly known as: SYNTHROID/LEVOTHROID  Used for: Hypothyroidism due to Hashimoto's thyroiditis      Dose: 50 mcg  Take 1 tablet (50 mcg) by mouth daily  Quantity: 90 tablet  Refills: 1     loperamide 2 MG capsule  Commonly known as: IMODIUM      Dose: 2 mg  Take 2 mg by mouth 4 times daily as needed for diarrhea  Refills: 0     ondansetron 8 MG tablet  Commonly known as: ZOFRAN  Used for: Multiple myeloma not having achieved remission (H)      Dose: 8 mg  Take 1 tablet (8 mg) by mouth every 8 hours as needed for nausea  Quantity: 60 tablet  Refills: 1     pantoprazole 40 MG EC tablet  Commonly known as: PROTONIX  Used for: Multiple myeloma not having achieved remission (H), Stomach upset      Dose: 40 mg  Take 1 tablet (40 mg) by mouth daily  Quantity: 30 tablet  Refills: 0     potassium chloride ER 10 MEQ CR tablet  Commonly known as: KLOR-CON M  Used for: Multiple myeloma not having achieved remission (H)      Dose: 20 mEq  Take 2 tablets (20 mEq) by mouth daily  Quantity: 30 tablet  Refills: 3     prochlorperazine 10 MG tablet  Commonly known as: COMPAZINE  Used for: Multiple myeloma not having achieved remission (H)      Dose: 10 mg  Take 1 tablet (10 mg) by mouth every 6 hours as needed (Nausea/Vomiting)  Quantity: 30 tablet  Refills: 5     vitamin B complex with vitamin C  tablet  Used for: Multiple myeloma not having achieved remission (H)      Dose: 1 tablet  Take 1 tablet by mouth daily *do not start until 2 weeks post transplant (11/24)  Quantity: 30 tablet  Refills: 1        STOP taking    levofloxacin 250 MG tablet  Commonly known as: LEVAQUIN        losartan 50 MG tablet  Commonly known as: COZAAR              Where to get your medicines      These medications were sent to [facility] - [place], [place] - [address]  [address], [facility] [address]    Phone: [phone]     ciprofloxacin 500 MG tablet    metroNIDAZOLE 500 MG tablet    potassium & sodium phosphates 280-160-250 MG Packet         Brief History of Illness:    **Adopted from H&P 11/20/2021  Admitted through the ED for neutropenic fever, diarrhea, nausea and abdominal pain. Diarrhea much worse over the last day or so. Took one dose of imodium and now has not had any stool. Has epigastric pain. No vomiting but remains nauseated. Had a temp to 100.4 at home. Also endorses headache, right ear pain, and sore throat. No mouth sores that he knows of. Oral intake has been minimal.     Hospital Course:        1. BMT  - BMT doc/Coordinator: Miguelangel/Du Newsome; Lorelei/Christine  - Cell dose 6.99 x 10^6; gave all cells due to high risk disease  - GCSF started day +5, continue until ANC > 2500 for 2 consecutive days. WBC 5.3 and ANC 3.9. Last dose GCSF today before discharge     2. HEME/COAG  - Transfusion parameters: hgb <7g/dL and plts <10,000..   - pancytopenic but not needing transfusions yet. Now neutropenic.     3. ID.   Neutropenic fever 11/20: Tx with cefepime -> changed to PO cipro/flagyl for colitis, continue through 11/26  - facial pain, obtain sinus CT = mild inflammatory sinusitis but no acute sinusitis. No additional tx, anticipate this will improve as counts recover. Viral PCR panel and covid negative on admission  Prophy:  Fluc, Levo (hold with cefepime), ACV. Bactrim for PCP prophy to  start at day +28     4. GI/NUTRITION  - Abdominal discomfort: CT A/P final read suggestive infectious/inflammatory enteritis/colitis. Continues on oral antibiotics cipro/flagyl through 11/26.   - DAYNA: schedule Zofran, compazine prn.    - loose stools, cdiff neg 11/20. Imodium prn.   - Ulcer prophy: Protonix 40mg daily.      5. CV  - Hx of CAD, MI at age 28 with stent placed  - HTN: losartan held with poor PO intake this hospital admission. Hydrochlorothiazide has been on hold through transplant due to marrow suppression. Would resume outpatient when appropraite  - Hyperlipidemia: hold statin through transplant     6. RENAL/FEN  - Cr stable  - K low at 3.1, give KCl per sliding scale today. Takes 20mEq PO daily - continue on discharge and adjust as needed  - Hypophosphatemia, likely secondary to engraftment, less likely refeeding but still possible. Phos better 11/23 at 2.6 (although this was while phos was running). Plan to start PO phos packets QID tomorrow with poss infusion for IV phos as needed     7. ENDOCRINE  - hx hypothyroidism, cont synthroid  - new diagnosis of papillary thyroid carcinoma per bx result from 11/2, appears that the plan is for thyroidectomy after BMT     8. Neuro  - peripheral neuropathy: gabapentin 300mg BID.  His feet felt worse on higher dose, he reports.   - start B complex vitamin (contains vit C) about 2 weeks post transplant to avoid interaction with chemo      Dispo: Discharge to home today with follow up in the BMT clinic tomorrow  - requested daily appts through the weekend since he will likely need IV lyte repletion    CODE STATUS: full code  Discharge Instructions and Follow-Up:    Discharge diet: Regular diet as tolerated  Discharge activity: Activity as tolerated   Discharge follow-up: Follow up with BMT Clinic as follows:  1. Wednesday, 11/24 check in at 11:45am for labs and 12:30 provider visit with infusion for IV electrolytes as needed  Discharge Disposition:       Discharged to home.    Deborah Carmona [facility]   [phone]

## 2021-11-23 ENCOUNTER — APPOINTMENT (OUTPATIENT)
Dept: CT IMAGING | Facility: CLINIC | Age: 51
DRG: 809 | End: 2021-11-23
Attending: PHYSICIAN ASSISTANT
Payer: COMMERCIAL

## 2021-11-23 ENCOUNTER — HOME INFUSION (PRE-WILLOW HOME INFUSION) (OUTPATIENT)
Dept: PHARMACY | Facility: CLINIC | Age: 51
End: 2021-11-23

## 2021-11-23 VITALS
BODY MASS INDEX: 26.37 KG/M2 | OXYGEN SATURATION: 100 % | HEIGHT: 65 IN | SYSTOLIC BLOOD PRESSURE: 137 MMHG | RESPIRATION RATE: 16 BRPM | WEIGHT: 158.3 LBS | DIASTOLIC BLOOD PRESSURE: 84 MMHG | TEMPERATURE: 98 F | HEART RATE: 99 BPM

## 2021-11-23 LAB
ANION GAP SERPL CALCULATED.3IONS-SCNC: 8 MMOL/L (ref 3–14)
BASOPHILS # BLD MANUAL: 0 10E3/UL (ref 0–0.2)
BASOPHILS NFR BLD MANUAL: 0 %
BLD PROD TYP BPU: NORMAL
BLD PROD TYP BPU: NORMAL
BLOOD COMPONENT TYPE: NORMAL
BLOOD COMPONENT TYPE: NORMAL
BUN SERPL-MCNC: 3 MG/DL (ref 7–30)
CALCIUM SERPL-MCNC: 7.5 MG/DL (ref 8.5–10.1)
CHLORIDE BLD-SCNC: 108 MMOL/L (ref 94–109)
CO2 SERPL-SCNC: 23 MMOL/L (ref 20–32)
CODING SYSTEM: NORMAL
CODING SYSTEM: NORMAL
CREAT SERPL-MCNC: 0.69 MG/DL (ref 0.66–1.25)
CROSSMATCH: NORMAL
ELLIPTOCYTES BLD QL SMEAR: SLIGHT
EOSINOPHIL # BLD MANUAL: 0 10E3/UL (ref 0–0.7)
EOSINOPHIL NFR BLD MANUAL: 0 %
ERYTHROCYTE [DISTWIDTH] IN BLOOD BY AUTOMATED COUNT: 14.7 % (ref 10–15)
GFR SERPL CREATININE-BSD FRML MDRD: >90 ML/MIN/1.73M2
GLUCOSE BLD-MCNC: 98 MG/DL (ref 70–99)
HCT VFR BLD AUTO: 24.9 % (ref 40–53)
HGB BLD-MCNC: 8.7 G/DL (ref 13.3–17.7)
INR PPP: 1.16 (ref 0.85–1.15)
LYMPHOCYTES # BLD MANUAL: 0.5 10E3/UL (ref 0.8–5.3)
LYMPHOCYTES NFR BLD MANUAL: 10 %
MAGNESIUM SERPL-MCNC: 1.8 MG/DL (ref 1.6–2.3)
MCH RBC QN AUTO: 30.6 PG (ref 26.5–33)
MCHC RBC AUTO-ENTMCNC: 34.9 G/DL (ref 31.5–36.5)
MCV RBC AUTO: 88 FL (ref 78–100)
MONOCYTES # BLD MANUAL: 0.8 10E3/UL (ref 0–1.3)
MONOCYTES NFR BLD MANUAL: 16 %
NEUTROPHILS # BLD MANUAL: 3.9 10E3/UL (ref 1.6–8.3)
NEUTROPHILS NFR BLD MANUAL: 74 %
PHOSPHATE SERPL-MCNC: 1.8 MG/DL (ref 2.5–4.5)
PHOSPHATE SERPL-MCNC: 1.9 MG/DL (ref 2.5–4.5)
PHOSPHATE SERPL-MCNC: 2.6 MG/DL (ref 2.5–4.5)
PLAT MORPH BLD: ABNORMAL
PLATELET # BLD AUTO: 28 10E3/UL (ref 150–450)
POTASSIUM BLD-SCNC: 3.1 MMOL/L (ref 3.4–5.3)
POTASSIUM BLD-SCNC: 3.7 MMOL/L (ref 3.4–5.3)
RBC # BLD AUTO: 2.84 10E6/UL (ref 4.4–5.9)
RBC MORPH BLD: ABNORMAL
SODIUM SERPL-SCNC: 139 MMOL/L (ref 133–144)
UNIT ABO/RH: NORMAL
UNIT ABO/RH: NORMAL
UNIT NUMBER: NORMAL
UNIT NUMBER: NORMAL
UNIT STATUS: NORMAL
UNIT STATUS: NORMAL
UNIT TYPE ISBT: 6200
UNIT TYPE ISBT: 6200
WBC # BLD AUTO: 5.3 10E3/UL (ref 4–11)

## 2021-11-23 PROCEDURE — 84100 ASSAY OF PHOSPHORUS: CPT | Performed by: INTERNAL MEDICINE

## 2021-11-23 PROCEDURE — 250N000013 HC RX MED GY IP 250 OP 250 PS 637: Performed by: INTERNAL MEDICINE

## 2021-11-23 PROCEDURE — 99239 HOSP IP/OBS DSCHRG MGMT >30: CPT | Performed by: INTERNAL MEDICINE

## 2021-11-23 PROCEDURE — 250N000011 HC RX IP 250 OP 636: Performed by: INTERNAL MEDICINE

## 2021-11-23 PROCEDURE — 250N000009 HC RX 250: Performed by: INTERNAL MEDICINE

## 2021-11-23 PROCEDURE — 70486 CT MAXILLOFACIAL W/O DYE: CPT | Mod: 26 | Performed by: RADIOLOGY

## 2021-11-23 PROCEDURE — 258N000003 HC RX IP 258 OP 636: Performed by: INTERNAL MEDICINE

## 2021-11-23 PROCEDURE — 83735 ASSAY OF MAGNESIUM: CPT | Performed by: INTERNAL MEDICINE

## 2021-11-23 PROCEDURE — 80048 BASIC METABOLIC PNL TOTAL CA: CPT | Performed by: INTERNAL MEDICINE

## 2021-11-23 PROCEDURE — 70486 CT MAXILLOFACIAL W/O DYE: CPT

## 2021-11-23 PROCEDURE — 85027 COMPLETE CBC AUTOMATED: CPT | Performed by: INTERNAL MEDICINE

## 2021-11-23 PROCEDURE — 85610 PROTHROMBIN TIME: CPT | Performed by: INTERNAL MEDICINE

## 2021-11-23 PROCEDURE — 250N000011 HC RX IP 250 OP 636: Performed by: PHYSICIAN ASSISTANT

## 2021-11-23 PROCEDURE — 250N000013 HC RX MED GY IP 250 OP 250 PS 637: Performed by: STUDENT IN AN ORGANIZED HEALTH CARE EDUCATION/TRAINING PROGRAM

## 2021-11-23 PROCEDURE — 84132 ASSAY OF SERUM POTASSIUM: CPT | Performed by: INTERNAL MEDICINE

## 2021-11-23 RX ORDER — POTASSIUM CHLORIDE 29.8 MG/ML
20 INJECTION INTRAVENOUS
Status: COMPLETED | OUTPATIENT
Start: 2021-11-23 | End: 2021-11-23

## 2021-11-23 RX ORDER — HEPARIN SODIUM (PORCINE) LOCK FLUSH IV SOLN 100 UNIT/ML 100 UNIT/ML
5 SOLUTION INTRAVENOUS
Status: CANCELLED | OUTPATIENT
Start: 2021-11-23

## 2021-11-23 RX ORDER — HEPARIN SODIUM,PORCINE 10 UNIT/ML
5 VIAL (ML) INTRAVENOUS
Status: CANCELLED | OUTPATIENT
Start: 2021-11-23

## 2021-11-23 RX ADMIN — POTASSIUM CHLORIDE 20 MEQ: 29.8 INJECTION, SOLUTION INTRAVENOUS at 08:35

## 2021-11-23 RX ADMIN — LEVOTHYROXINE SODIUM 50 MCG: 0.05 TABLET ORAL at 08:34

## 2021-11-23 RX ADMIN — Medication 350 MCG: at 12:50

## 2021-11-23 RX ADMIN — SODIUM PHOSPHATE, MONOBASIC, MONOHYDRATE AND SODIUM PHOSPHATE, DIBASIC, ANHYDROUS 15 MMOL: 276; 142 INJECTION, SOLUTION INTRAVENOUS at 01:44

## 2021-11-23 RX ADMIN — ONDANSETRON HYDROCHLORIDE 8 MG: 8 TABLET, FILM COATED ORAL at 15:08

## 2021-11-23 RX ADMIN — POTASSIUM CHLORIDE 20 MEQ: 29.8 INJECTION, SOLUTION INTRAVENOUS at 06:06

## 2021-11-23 RX ADMIN — ACYCLOVIR 800 MG: 800 TABLET ORAL at 08:34

## 2021-11-23 RX ADMIN — METRONIDAZOLE 500 MG: 500 TABLET ORAL at 08:34

## 2021-11-23 RX ADMIN — FLUCONAZOLE 200 MG: 200 TABLET ORAL at 08:34

## 2021-11-23 RX ADMIN — PANTOPRAZOLE SODIUM 40 MG: 40 TABLET, DELAYED RELEASE ORAL at 08:35

## 2021-11-23 RX ADMIN — ONDANSETRON HYDROCHLORIDE 8 MG: 8 TABLET, FILM COATED ORAL at 04:24

## 2021-11-23 RX ADMIN — GABAPENTIN 300 MG: 300 CAPSULE ORAL at 08:34

## 2021-11-23 RX ADMIN — LOPERAMIDE HYDROCHLORIDE 2 MG: 2 CAPSULE ORAL at 15:59

## 2021-11-23 RX ADMIN — CIPROFLOXACIN HYDROCHLORIDE 500 MG: 500 TABLET, FILM COATED ORAL at 08:34

## 2021-11-23 RX ADMIN — B-COMPLEX W/ C & FOLIC ACID TAB 1 TABLET: TAB at 08:34

## 2021-11-23 RX ADMIN — METRONIDAZOLE 500 MG: 500 TABLET ORAL at 15:07

## 2021-11-23 RX ADMIN — SODIUM PHOSPHATE, MONOBASIC, MONOHYDRATE AND SODIUM PHOSPHATE, DIBASIC, ANHYDROUS 15 MMOL: 276; 142 INJECTION, SOLUTION INTRAVENOUS at 14:02

## 2021-11-23 RX ADMIN — POTASSIUM CHLORIDE 20 MEQ: 750 TABLET, EXTENDED RELEASE ORAL at 08:34

## 2021-11-23 RX ADMIN — Medication 1 TABLET: at 08:34

## 2021-11-23 ASSESSMENT — ACTIVITIES OF DAILY LIVING (ADL)
ADLS_ACUITY_SCORE: 3

## 2021-11-23 ASSESSMENT — MIFFLIN-ST. JEOR: SCORE: 1489.92

## 2021-11-23 NOTE — SUMMARY OF CARE
Discharge Medications    1. Stop taking levaquin  2. Stop taking losartan for now (blood pressure has been lower with GI symptoms and less food intake); will advise when to restart in clinic  2. Take ciprofloxacin and flagyl until the bottles run out (there will be enough to get through 11/26)  3. Start phosphorus packets four times daily  4. Continue potassium replacement       Review of your medicines      START taking      Dose / Directions   ciprofloxacin 500 MG tablet  Commonly known as: CIPRO  Indication: Infection Within the Abdomen  Used for: Multiple myeloma not having achieved remission (H), Autologous donor, stem cells      Dose: 500 mg  Take 1 tablet (500 mg) by mouth every 12 hours  Quantity: 7 tablet  Refills: 0     metroNIDAZOLE 500 MG tablet  Commonly known as: FLAGYL  Indication: Infection Within the Abdomen  Used for: Multiple myeloma not having achieved remission (H), Autologous donor, stem cells      Dose: 500 mg  Take 1 tablet (500 mg) by mouth 3 times daily  Quantity: 3 tablet  Refills: 0     potassium & sodium phosphates 280-160-250 MG Packet  Commonly known as: NEUTRA-PHOS  Used for: Multiple myeloma not having achieved remission (H), Hypothyroidism due to Hashimoto's thyroiditis      Dose: 1 packet  Take 1 packet by mouth 4 times daily  Quantity: 20 packet  Refills: 0        CONTINUE these medicines which have NOT CHANGED      Dose / Directions   acyclovir 800 MG tablet  Commonly known as: ZOVIRAX  Indication: Varicella Zoster prophylaxis  Used for: Multiple myeloma not having achieved remission (H)      Dose: 800 mg  Take 1 tablet (800 mg) by mouth 2 times daily  Quantity: 60 tablet  Refills: 3     calcium carb-cholecalciferol 500-200 MG-UNIT tablet  Commonly known as: OS-HARDEEP  Used for: Multiple myeloma not having achieved remission (H)      Dose: 1 tablet  Take 1 tablet by mouth daily  Quantity: 30 tablet  Refills: 1     fluconazole 200 MG tablet  Commonly known as: DIFLUCAN  Indication:  Candidiasis Prophylaxis  Used for: Multiple myeloma not having achieved remission (H)      Dose: 200 mg  Take 1 tablet (200 mg) by mouth daily  Quantity: 30 tablet  Refills: 1     gabapentin 300 MG capsule  Commonly known as: NEURONTIN  Used for: Multiple myeloma not having achieved remission (H)      Dose: 300 mg  Take 1 capsule (300 mg) by mouth 2 times daily  Quantity: 90 capsule  Refills: 1     levothyroxine 50 MCG tablet  Commonly known as: SYNTHROID/LEVOTHROID  Used for: Hypothyroidism due to Hashimoto's thyroiditis      Dose: 50 mcg  Take 1 tablet (50 mcg) by mouth daily  Quantity: 90 tablet  Refills: 1     loperamide 2 MG capsule  Commonly known as: IMODIUM      Dose: 2 mg  Take 2 mg by mouth 4 times daily as needed for diarrhea  Refills: 0     ondansetron 8 MG tablet  Commonly known as: ZOFRAN  Used for: Multiple myeloma not having achieved remission (H)      Dose: 8 mg  Take 1 tablet (8 mg) by mouth every 8 hours as needed for nausea  Quantity: 60 tablet  Refills: 1     pantoprazole 40 MG EC tablet  Commonly known as: PROTONIX  Used for: Multiple myeloma not having achieved remission (H), Stomach upset      Dose: 40 mg  Take 1 tablet (40 mg) by mouth daily  Quantity: 30 tablet  Refills: 0     potassium chloride ER 10 MEQ CR tablet  Commonly known as: KLOR-CON M  Used for: Multiple myeloma not having achieved remission (H)      Dose: 20 mEq  Take 2 tablets (20 mEq) by mouth daily  Quantity: 30 tablet  Refills: 3     prochlorperazine 10 MG tablet  Commonly known as: COMPAZINE  Used for: Multiple myeloma not having achieved remission (H)      Dose: 10 mg  Take 1 tablet (10 mg) by mouth every 6 hours as needed (Nausea/Vomiting)  Quantity: 30 tablet  Refills: 5     vitamin B complex with vitamin C tablet  Used for: Multiple myeloma not having achieved remission (H)      Dose: 1 tablet  Take 1 tablet by mouth daily *do not start until 2 weeks post transplant (11/24)  Quantity: 30 tablet  Refills: 1        STOP  taking    levofloxacin 250 MG tablet  Commonly known as: LEVAQUIN        losartan 50 MG tablet  Commonly known as: COZAAR              Where to get your medicines      These medications were sent to [facility] - [place], [place] - [address] [address]    Phone: [phone]     ciprofloxacin 500 MG tablet    metroNIDAZOLE 500 MG tablet    potassium & sodium phosphates 280-160-250 MG Packet

## 2021-11-23 NOTE — PLAN OF CARE
"[id number]    /85 (BP Location: Left arm)   Pulse 98   Temp 98.8  F (37.1  C) (Axillary)   Resp 16   Ht 1.651 m (5' 5\")   Wt 72.6 kg (160 lb)   SpO2 100%   BMI 26.63 kg/m      Alert and oriented.  VSS on RA.  Afebrile.  Nausea controled with scheduled zofran.  Diarrhea continues, imodium given x 1.  Neupogen given as ordered.  Phos replacement competed this shift, recheck scheduled for midnight.  Independent in room.  Showered and linens changed.      Problem: Adult Inpatient Plan of Care  Goal: Plan of Care Review  Outcome: No Change  Goal: Patient-Specific Goal (Individualized)  Outcome: No Change  Goal: Absence of Hospital-Acquired Illness or Injury  Outcome: No Change  Intervention: Identify and Manage Fall Risk  Recent Flowsheet Documentation  Taken 11/22/2021 1536 by Dwaine Galicia, RN  Safety Promotion/Fall Prevention:    assistive device/personal items within reach    clutter free environment maintained    nonskid shoes/slippers when out of bed  Intervention: Prevent Skin Injury  Recent Flowsheet Documentation  Taken 11/22/2021 1536 by Dwaine Galicia, RN  Body Position: position changed independently  Intervention: Prevent and Manage VTE (Venous Thromboembolism) Risk  Recent Flowsheet Documentation  Taken 11/22/2021 1536 by Dwaine Galicia, RN  VTE Prevention/Management: ambulation promoted  Goal: Optimal Comfort and Wellbeing  Outcome: No Change  Goal: Readiness for Transition of Care  Outcome: No Change     Problem: Adjustment to Transplant (Stem Cell/Bone Marrow Transplant)  Goal: Optimal Coping with Transplant  Outcome: No Change     Problem: Diarrhea (Stem Cell/Bone Marrow Transplant)  Goal: Diarrhea Symptom Control  Outcome: No Change     Problem: Fatigue (Stem Cell/Bone Marrow Transplant)  Goal: Energy Level Supports Daily Activity  Outcome: No Change     Problem: Hematologic Alteration (Stem Cell/Bone Marrow Transplant)  Goal: Blood Counts Within Acceptable " Range  Outcome: No Change     Problem: Hypersensitivity Reaction (Stem Cell/Bone Marrow Transplant)  Goal: Absence of Hypersensitivity Reaction  Outcome: No Change     Problem: Infection Risk (Stem Cell/Bone Marrow Transplant)  Goal: Absence of Infection  Outcome: No Change

## 2021-11-23 NOTE — PROGRESS NOTES
"BMT Progress Note     ID: Jitendrmagui Ronquillo is a 52 yo man D+12 s/p auto PBSCT for high risk MM. Re-admitted with neutropenic fevers, abdominal pain, nausea and diarrhea.      HPI: Afebrile overnight. Ongoing right sided facial pain and sinus congestion. No nasal discharge. No mucositis. Ongoing diarrhea, improved with imodium. Had one formed stool and one mixed stool already this morning. Eating ok, but doesn't have an appetite. Had rice and beans twice yesterday.    ROS: 8 point ROS except as stated above in HPI     Physical Exam  Blood pressure 127/80, pulse 93, temperature 97.6  F (36.4  C), temperature source Oral, resp. rate 17, height 1.651 m (5' 5\"), weight 71.8 kg (158 lb 4.8 oz), SpO2 98 %.    Wt Readings from Last 4 Encounters:   11/23/21 71.8 kg (158 lb 4.8 oz)   11/19/21 72.1 kg (159 lb)   11/18/21 72.3 kg (159 lb 6.4 oz)   11/17/21 72.3 kg (159 lb 4.8 oz)     General: NAD,   Eyes: sclera anicteric   Nose/Mouth/Throat: OP dry, no ulcerations   Lungs: CTA bilaterally  Cardiovascular: RRR, no M/R/G   Abdominal/Rectal: +BS, soft, no tenderness to palpation  Lymphatics: No edema  Skin: No rashes  Neuro: A&O   Access: CVC site NT, no drainage.     Labs:  Lab Results   Component Value Date    WBC 5.3 11/23/2021    ANEU 3.9 11/23/2021    HGB 8.7 (L) 11/23/2021    HCT 24.9 (L) 11/23/2021    PLT 28 (LL) 11/23/2021     11/23/2021    POTASSIUM 3.1 (L) 11/23/2021    CHLORIDE 108 11/23/2021    CO2 23 11/23/2021    GLC 98 11/23/2021    BUN 3 (L) 11/23/2021    CR 0.69 11/23/2021    MAG 1.8 11/23/2021    INR 1.16 (H) 11/23/2021    BILITOTAL 0.2 11/22/2021    AST 5 11/22/2021    ALT 9 11/22/2021    ALKPHOS 51 11/22/2021    PROTTOTAL 5.3 (L) 11/22/2021    ALBUMIN 2.4 (L) 11/22/2021       I have assessed all abnormal lab values for their clinical significance and any values considered clinically significant have been addressed in the assessment and plan.        A/P: June Ronquillo is a 52 yo man D+12 s/p " auto PBSCT for high risk MM      1. BMT  - BMT doc/Coordinator: Miguelangel/Du Newsome; Lorelei/Christine  - Cell dose 6.99 x 10^6; gave all cells due to high risk disease  - GCSF started day +5, continue until ANC > 2500 for 2 consecutive days. WBC 5.3 and ANC 3.9. Last dose GCSF today before discharge     2. HEME/COAG  - Transfusion parameters: hgb <7g/dL and plts <10,000..   - pancytopenic but not needing transfusions yet. Now neutropenic.     3. ID.   Neutropenic fever 11/20: Tx with cefepime -> changed to PO cipro/flagyl for colitis, continue through 11/26  - facial pain, obtain sinus CT = mild inflammatory sinusitis but no acute sinusitis. No additional tx, anticipate this will improve as counts recover. Viral PCR panel and covid negative on admission  Prophy:  Fluc, ACV. Bactrim for PCP prophy to start at day +28     4. GI/NUTRITION  - Abdominal discomfort: CT A/P final read suggestive infectious/inflammatory enteritis/colitis. Continues on oral antibiotics cipro/flagyl through 11/26.   - DAYNA: schedule Zofran, compazine prn.    - loose stools, cdiff neg 11/20. Imodium prn.   - Ulcer prophy: Protonix 40mg daily.      5. CV  - Hx of CAD, MI at age 28 with stent placed  - HTN: losartan held with poor PO intake this hospital admission. Hydrochlorothiazide has been on hold through transplant due to marrow suppression. Would resume outpatient when appropraite  - Hyperlipidemia: hold statin through transplant     6. RENAL/FEN  - Cr stable  - K low at 3.1, give KCl per sliding scale today. Takes 20mEq PO daily - continue on discharge and adjust as needed  - Hypophosphatemia, likely secondary to engraftment, less likely refeeding but still possible. Phos better 11/23 at 2.6 (although this was while phos was running). Plan to start PO phos packets QID tomorrow with poss infusion for IV phos as needed     7. ENDOCRINE  - hx hypothyroidism, cont synthroid  - new diagnosis of papillary thyroid carcinoma per bx result from 11/2,  appears that the plan is for thyroidectomy after BMT     8. Neuro  - peripheral neuropathy: gabapentin 300mg BID.  His feet felt worse on higher dose, he reports.   - start B complex vitamin (contains vit C) about 2 weeks post transplant to avoid interaction with chemo      Dispo: Discharge to home today with follow up in the BMT clinic tomorrow  - requested daily appts through the weekend since he will likely need IV lyte repletion    Bibi Delgado PA-C  [phone]

## 2021-11-23 NOTE — PLAN OF CARE
AVSS. Pt complains of sinus/ear pain, CT sinus completed. Imodium given x1. Phos replaced x2. Up ad carin. Will follow up in clinic tomorrow as scheduled. Meds reviewed. Discharged to home. All questions answered.  Problem: Adult Inpatient Plan of Care  Goal: Plan of Care Review  Outcome: Adequate for Discharge  Goal: Patient-Specific Goal (Individualized)  Outcome: Adequate for Discharge  Goal: Absence of Hospital-Acquired Illness or Injury  Outcome: Adequate for Discharge  Goal: Optimal Comfort and Wellbeing  Outcome: Adequate for Discharge  Goal: Readiness for Transition of Care  Outcome: Adequate for Discharge     Problem: Adjustment to Transplant (Stem Cell/Bone Marrow Transplant)  Goal: Optimal Coping with Transplant  Outcome: Adequate for Discharge     Problem: Diarrhea (Stem Cell/Bone Marrow Transplant)  Goal: Diarrhea Symptom Control  Outcome: Adequate for Discharge     Problem: Fatigue (Stem Cell/Bone Marrow Transplant)  Goal: Energy Level Supports Daily Activity  Outcome: Adequate for Discharge     Problem: Hematologic Alteration (Stem Cell/Bone Marrow Transplant)  Goal: Blood Counts Within Acceptable Range  Outcome: Adequate for Discharge     Problem: Hypersensitivity Reaction (Stem Cell/Bone Marrow Transplant)  Goal: Absence of Hypersensitivity Reaction  Outcome: Adequate for Discharge     Problem: Infection Risk (Stem Cell/Bone Marrow Transplant)  Goal: Absence of Infection  Outcome: Adequate for Discharge     Problem: Discharge Planning  Goal: Discharge Planning (Adult, OB, Behavioral, Peds)  Outcome: Adequate for Discharge

## 2021-11-23 NOTE — CONSULTS
Care Management Initial Consult    General Information  Assessment completed with: VM-chart review,    Type of CM/SW Visit: Denies Needs at Discharge    Primary Care Provider verified and updated as needed: Yes   Readmission within the last 30 days:           Advance Care Planning:            Communication Assessment  Patient's communication style: spoken language (English or Bilingual)    Hearing Difficulty or Deaf: no   Wear Glasses or Blind: no    Cognitive  Cognitive/Neuro/Behavioral: WDL                      Living Environment:   People in home: child(rakel), adult,grandchild(rakel),spouse     Current living Arrangements: house      Able to return to prior arrangements:         Family/Social Support:  Care provided by: self,spouse/significant other  Provides care for:    Marital Status:   Wife   (Ginette)       Description of Support System: Supportive,Involved    Support Assessment: Adequate family and caregiver support    Current Resources:   Patient receiving home care services:       Community Resources:    Equipment currently used at home: none  Supplies currently used at home:      Employment/Financial:  Employment Status: employed full-time        Financial Concerns: No concerns identified   Referral to Financial Counselor: No       Lifestyle & Psychosocial Needs:  Social Determinants of Health     Tobacco Use: Low Risk      Smoking Tobacco Use: Never Smoker     Smokeless Tobacco Use: Never Used   Alcohol Use: Not on file   Financial Resource Strain: Not on file   Food Insecurity: Not on file   Transportation Needs: Not on file   Physical Activity: Not on file   Stress: Not on file   Social Connections: Not on file   Intimate Partner Violence: Not At Risk     Fear of Current or Ex-Partner: No     Emotionally Abused: No     Physically Abused: No     Sexually Abused: No   Depression: Not at risk     PHQ-2 Score: 0   Housing Stability: Not on file       Functional Status:  Prior to admission patient needed  assistance:              Mental Health Status:  Mental Health Status: No Current Concerns       Chemical Dependency Status:  Chemical Dependency Status: No Current Concerns             Values/Beliefs:  Spiritual, Cultural Beliefs, [employer] Practices, Values that affect care:                 Additional Information:  Pt is 51 year old male post auto BMT. Pt is day +12 and readmitted due to fevers and nausea. Plan for transition to oral abx and then return home. CSW provided empathic listening, validation of concerns, and encouragement. CSW encouraged Pt to contact CSW for support, questions and/or resources.     IRASEMA Farrar, [facility]  Pager: [phone]  Phone: [phone]

## 2021-11-23 NOTE — PROGRESS NOTES
"Discharge SBAR:    Situation:  June Ronquillo is a 51 year old being discharged to: Home  Admission reason: Fever and Failure to Thrive  Is this a readmission? Yes  Discharge time: 1720  Discharge barrier if discharged after 11AM: needed sinus CT, phosphorus replacement    Background:  Primary diagnosis: Multiple Myeloma  /84 (BP Location: Left arm)   Pulse 99   Temp 98  F (36.7  C) (Oral)   Resp 16   Ht 1.651 m (5' 5\")   Wt 71.8 kg (158 lb 4.8 oz)   SpO2 100%   BMI 26.34 kg/m    Type of donor: Autologous  Type of stem cells: PBSC  Relapsed? No  Falls Precautions? No  Isolation? No  DNR? No  DNI? No  Confidential Patient? No  Positive blood cultures? No    Assessment:  Discharge teaching    Review discharge medications and schedule: Yes    Set up pill box: No    Discharge instructions reviewed: Yes    Special considerations (think about previous reactions, issues with flushing CVC, premedication needs, etc): No    Patient Concerns: No    Recommendations:  Anticipated needs:    Daily infusions: Yes: electrolytes    Daily transfusions: No    G-CSF: Yes    Other: Not Applicable  Verbal report called to clinic: No      "

## 2021-11-24 ENCOUNTER — PATIENT OUTREACH (OUTPATIENT)
Dept: CARE COORDINATION | Facility: CLINIC | Age: 51
End: 2021-11-24
Payer: COMMERCIAL

## 2021-11-24 ENCOUNTER — LAB (OUTPATIENT)
Dept: LAB | Facility: CLINIC | Age: 51
End: 2021-11-24
Attending: INTERNAL MEDICINE
Payer: COMMERCIAL

## 2021-11-24 ENCOUNTER — INFUSION THERAPY VISIT (OUTPATIENT)
Dept: TRANSPLANT | Facility: CLINIC | Age: 51
End: 2021-11-24
Attending: PHYSICIAN ASSISTANT
Payer: COMMERCIAL

## 2021-11-24 VITALS
OXYGEN SATURATION: 100 % | SYSTOLIC BLOOD PRESSURE: 124 MMHG | BODY MASS INDEX: 26.96 KG/M2 | DIASTOLIC BLOOD PRESSURE: 80 MMHG | WEIGHT: 162 LBS | RESPIRATION RATE: 18 BRPM | TEMPERATURE: 97.9 F | HEART RATE: 94 BPM

## 2021-11-24 DIAGNOSIS — Z94.81 STATUS POST BONE MARROW TRANSPLANT (H): Primary | ICD-10-CM

## 2021-11-24 DIAGNOSIS — Z71.89 OTHER SPECIFIED COUNSELING: ICD-10-CM

## 2021-11-24 DIAGNOSIS — D70.9 NEUTROPENIC FEVER (H): ICD-10-CM

## 2021-11-24 DIAGNOSIS — C90.00 MULTIPLE MYELOMA NOT HAVING ACHIEVED REMISSION (H): Primary | ICD-10-CM

## 2021-11-24 DIAGNOSIS — R50.81 NEUTROPENIC FEVER (H): ICD-10-CM

## 2021-11-24 LAB
ABO/RH(D): ABNORMAL
ANION GAP SERPL CALCULATED.3IONS-SCNC: 10 MMOL/L (ref 3–14)
ANTIBODY SCREEN, TUBE: ABNORMAL
ANTIBODY SCREEN: POSITIVE
BASOPHILS # BLD MANUAL: 0.1 10E3/UL (ref 0–0.2)
BASOPHILS NFR BLD MANUAL: 1 %
BUN SERPL-MCNC: 7 MG/DL (ref 7–30)
BURR CELLS BLD QL SMEAR: SLIGHT
CALCIUM SERPL-MCNC: 8.1 MG/DL (ref 8.5–10.1)
CHLORIDE BLD-SCNC: 105 MMOL/L (ref 94–109)
CO2 SERPL-SCNC: 23 MMOL/L (ref 20–32)
CREAT SERPL-MCNC: 0.68 MG/DL (ref 0.66–1.25)
EOSINOPHIL # BLD MANUAL: 0 10E3/UL (ref 0–0.7)
EOSINOPHIL NFR BLD MANUAL: 0 %
ERYTHROCYTE [DISTWIDTH] IN BLOOD BY AUTOMATED COUNT: 14.9 % (ref 10–15)
FRAGMENTS BLD QL SMEAR: SLIGHT
GFR SERPL CREATININE-BSD FRML MDRD: >90 ML/MIN/1.73M2
GLUCOSE BLD-MCNC: 115 MG/DL (ref 70–99)
HCT VFR BLD AUTO: 27.3 % (ref 40–53)
HGB BLD-MCNC: 9.4 G/DL (ref 13.3–17.7)
LYMPHOCYTES # BLD MANUAL: 0.8 10E3/UL (ref 0.8–5.3)
LYMPHOCYTES NFR BLD MANUAL: 8 %
MAGNESIUM SERPL-MCNC: 1.7 MG/DL (ref 1.6–2.3)
MCH RBC QN AUTO: 30.1 PG (ref 26.5–33)
MCHC RBC AUTO-ENTMCNC: 34.4 G/DL (ref 31.5–36.5)
MCV RBC AUTO: 88 FL (ref 78–100)
MONOCYTES # BLD MANUAL: 0.4 10E3/UL (ref 0–1.3)
MONOCYTES NFR BLD MANUAL: 4 %
MYELOCYTES # BLD MANUAL: 0.2 10E3/UL
MYELOCYTES NFR BLD MANUAL: 2 %
NEUTROPHILS # BLD MANUAL: 8.8 10E3/UL (ref 1.6–8.3)
NEUTROPHILS NFR BLD MANUAL: 85 %
PHOSPHATE SERPL-MCNC: 2.4 MG/DL (ref 2.5–4.5)
PLAT MORPH BLD: ABNORMAL
PLATELET # BLD AUTO: 39 10E3/UL (ref 150–450)
POTASSIUM BLD-SCNC: 3.5 MMOL/L (ref 3.4–5.3)
RBC # BLD AUTO: 3.12 10E6/UL (ref 4.4–5.9)
RBC MORPH BLD: ABNORMAL
SODIUM SERPL-SCNC: 138 MMOL/L (ref 133–144)
SPECIMEN EXPIRATION DATE: ABNORMAL
SPECIMEN EXPIRATION DATE: ABNORMAL
WBC # BLD AUTO: 10.3 10E3/UL (ref 4–11)

## 2021-11-24 PROCEDURE — 99214 OFFICE O/P EST MOD 30 MIN: CPT

## 2021-11-24 PROCEDURE — G0463 HOSPITAL OUTPT CLINIC VISIT: HCPCS

## 2021-11-24 PROCEDURE — 36592 COLLECT BLOOD FROM PICC: CPT

## 2021-11-24 PROCEDURE — 83735 ASSAY OF MAGNESIUM: CPT | Performed by: PHYSICIAN ASSISTANT

## 2021-11-24 PROCEDURE — 80048 BASIC METABOLIC PNL TOTAL CA: CPT

## 2021-11-24 PROCEDURE — 85027 COMPLETE CBC AUTOMATED: CPT

## 2021-11-24 PROCEDURE — 36415 COLL VENOUS BLD VENIPUNCTURE: CPT

## 2021-11-24 PROCEDURE — 250N000011 HC RX IP 250 OP 636: Performed by: PHYSICIAN ASSISTANT

## 2021-11-24 PROCEDURE — 84100 ASSAY OF PHOSPHORUS: CPT

## 2021-11-24 PROCEDURE — 86850 RBC ANTIBODY SCREEN: CPT

## 2021-11-24 PROCEDURE — 86900 BLOOD TYPING SEROLOGIC ABO: CPT

## 2021-11-24 RX ORDER — HEPARIN SODIUM,PORCINE 10 UNIT/ML
5 VIAL (ML) INTRAVENOUS
Status: DISCONTINUED | OUTPATIENT
Start: 2021-11-24 | End: 2021-11-24 | Stop reason: HOSPADM

## 2021-11-24 RX ADMIN — Medication 5 ML: at 11:59

## 2021-11-24 RX ADMIN — Medication 5 ML: at 12:00

## 2021-11-24 ASSESSMENT — PAIN SCALES - GENERAL: PAINLEVEL: MODERATE PAIN (4)

## 2021-11-24 NOTE — PROGRESS NOTES
Clinic Care Coordination Contact    Background: Care Coordination referral placed from [facility] discharge report for reason of patient meeting criteria for a TCM outreach call by [facility] team.    Assessment: Upon chart review, [facility] Team member will cancel/close the referral for TCM outreach due to reason below:    Patient has a follow up appointment with an appropriate provider today for hospital discharge    Plan: Care Coordination referral for TCM outreach canceled.    Judith Reynolds MA  [facility] [place], [facility]

## 2021-11-24 NOTE — PROGRESS NOTES
BMT Progress Note       ID: Jitendrmagui Ronquillo is a 52 yo man D+13 s/p auto PBSCT for high risk MM. Re-admitted with neutropenic fevers, abdominal pain, nausea and diarrhea, now discharged.      HPI: Presents for hospital discharge. Feeling overall much better. Has had 3-4 loose stools since discharge, managed with imodium prn. No abdominal pain, n/v. Intake is improving. Ate some chicken and sweet potatoes yesterday. Decided to start drinking Coke to help with his phosphorus level. No bleeding or infectious concerns. Confirms continuing on antibiotics. Has not taken his potassium tablet today.      ROS: 8 point ROS except as stated above in HPI     Physical Exam  /80   Pulse 94   Temp 97.9  F (36.6  C)   Resp 18   Wt 73.5 kg (162 lb)   SpO2 100%   BMI 26.96 kg/m      Wt Readings from Last 5 Encounters:   11/24/21 73.5 kg (162 lb)   11/23/21 71.8 kg (158 lb 4.8 oz)   11/19/21 72.1 kg (159 lb)   11/18/21 72.3 kg (159 lb 6.4 oz)   11/17/21 72.3 kg (159 lb 4.8 oz)        General: NAD,   Eyes: sclera anicteric   Nose/Mouth/Throat: OP dry, no ulcerations   Lungs: CTA bilaterally  Cardiovascular: RRR, no M/R/G   Abdominal/Rectal: +BS, soft, no tenderness to palpation  Lymphatics: No edema  Skin: No rashes  Neuro: A&O   Access: CVC site NT, no drainage.     Labs:  Lab Results   Component Value Date    WBC 10.3 11/24/2021    ANEU 8.8 (H) 11/24/2021    HGB 9.4 (L) 11/24/2021    HCT 27.3 (L) 11/24/2021    PLT 39 (LL) 11/24/2021     11/24/2021    POTASSIUM 3.5 11/24/2021    CHLORIDE 105 11/24/2021    CO2 23 11/24/2021     (H) 11/24/2021    BUN 7 11/24/2021    CR 0.68 11/24/2021    MAG 1.7 11/24/2021    INR 1.16 (H) 11/23/2021    BILITOTAL 0.2 11/22/2021    AST 5 11/22/2021    ALT 9 11/22/2021    ALKPHOS 51 11/22/2021    PROTTOTAL 5.3 (L) 11/22/2021    ALBUMIN 2.4 (L) 11/22/2021       I have assessed all abnormal lab values for their clinical significance and any values considered clinically  significant have been addressed in the assessment and plan.          A/P: June Ronquillo is a 52 yo man D+13 s/p auto PBSCT for high risk MM      1. BMT  - BMT doc/Coordinator: Miguelangel/Du Newsome; Lorelei/Christine  - Cell dose 6.99 x 10^6; gave all cells due to high risk disease  - GCSF completed 11/23.      2. HEME/COAG  - Transfusion parameters: hgb <7g/dL and plts <10,000..   - Anemia and thrombocytopenia secondary to BMT.      3. ID.   Neutropenic fever/colitis- s/p cefepime. Continue cipro/flagyl through 11/26.   - facial pain, sinus CT with mild inflammatory sinusitis but no acute sinusitis. No additional tx, anticipate this will improve as counts recover. Viral PCR panel and covid negative on admission  Prophy:  Fluc, ACV. Bactrim for PCP prophy to start at day +28     4. GI/NUTRITION  - Abdominal discomfort: CT A/P suggestive infectious/inflammatory enteritis/colitis. Continues on oral antibiotics cipro/flagyl through 11/26.   - DAYNA: schedule Zofran, compazine prn.    - loose stools, cdiff neg 11/20. Imodium prn. Taper as able   - Ulcer prophy: Protonix 40mg daily.      5. CV  - Hx of CAD, MI at age 28 with stent placed  - HTN: losartan and hydrochlorothiazide on hold. Continue to hold for now.   - Hyperlipidemia: hold statin through transplant     6. RENAL/FEN  - Cr stable  - hypokalemia: on PO k+ 20mEq daily. Potassium normal today.   - Hypophosphatemia, likely secondary to engraftment. On PO phos packets QID. Improving, can likely stop these in a few days if phos is normal as intake is improving.      7. ENDOCRINE  - hx hypothyroidism, cont synthroid  - new diagnosis of papillary thyroid carcinoma per bx result from 11/2, appears that the plan is for thyroidectomy after BMT     8. Neuro  - peripheral neuropathy: gabapentin 300mg BID.  His feet felt worse on higher dose, he reports.   - start B complex vitamin (contains vit C) about 2 weeks post transplant to avoid interaction with chemo. Will re-visit this  next week.     RTC: Friday for labs, provider visit and infusion for possible lytes. If lytes still stable will give pt a few days off.     30 minutes spent on the date of the encounter doing chart review, history and exam, documentation and further activities per the note      Shukri Moffett PA-C  [phone]

## 2021-11-24 NOTE — LETTER
11/24/2021         RE: June Ronquillo  [address]  [facility] [address]        Dear Colleague,    Thank you for referring your patient, June Ronquillo, to the [facility] BLOOD AND MARROW TRANSPLANT PROGRAM [place]. Please see a copy of my visit note below.    BMT Progress Note     ID: June Ronquillo is a 52 yo man D+13 s/p auto PBSCT for high risk MM. Re-admitted with neutropenic fevers, abdominal pain, nausea and diarrhea, now discharged.      HPI: Presents for hospital discharge. Feeling overall much better. Has had 3-4 loose stools since discharge, managed with imodium prn. No abdominal pain, n/v. Intake is improving. Ate some chicken and sweet potatoes yesterday. Decided to start drinking Coke to help with his phosphorus level. No bleeding or infectious concerns. Confirms continuing on antibiotics. Has not taken his potassium tablet today.      ROS: 8 point ROS except as stated above in HPI     Physical Exam  /80   Pulse 94   Temp 97.9  F (36.6  C)   Resp 18   Wt 73.5 kg (162 lb)   SpO2 100%   BMI 26.96 kg/m      Wt Readings from Last 5 Encounters:   11/24/21 73.5 kg (162 lb)   11/23/21 71.8 kg (158 lb 4.8 oz)   11/19/21 72.1 kg (159 lb)   11/18/21 72.3 kg (159 lb 6.4 oz)   11/17/21 72.3 kg (159 lb 4.8 oz)        General: NAD,   Eyes: sclera anicteric   Nose/Mouth/Throat: OP dry, no ulcerations   Lungs: CTA bilaterally  Cardiovascular: RRR, no M/R/G   Abdominal/Rectal: +BS, soft, no tenderness to palpation  Lymphatics: No edema  Skin: No rashes  Neuro: A&O   Access: CVC site NT, no drainage.     Labs:  Lab Results   Component Value Date    WBC 10.3 11/24/2021    ANEU 8.8 (H) 11/24/2021    HGB 9.4 (L) 11/24/2021    HCT 27.3 (L) 11/24/2021    PLT 39 (LL) 11/24/2021     11/24/2021    POTASSIUM 3.5 11/24/2021    CHLORIDE 105 11/24/2021    CO2 23 11/24/2021     (H) 11/24/2021    BUN 7 11/24/2021    CR 0.68 11/24/2021    MAG 1.7 11/24/2021    INR 1.16  (H) 11/23/2021    BILITOTAL 0.2 11/22/2021    AST 5 11/22/2021    ALT 9 11/22/2021    ALKPHOS 51 11/22/2021    PROTTOTAL 5.3 (L) 11/22/2021    ALBUMIN 2.4 (L) 11/22/2021       I have assessed all abnormal lab values for their clinical significance and any values considered clinically significant have been addressed in the assessment and plan.          A/P: June Ronquillo is a 52 yo man D+13 s/p auto PBSCT for high risk MM      1. BMT  - BMT doc/Coordinator: Miguelangel/Du Newsome; Lorelei/Christine  - Cell dose 6.99 x 10^6; gave all cells due to high risk disease  - GCSF completed 11/23.      2. HEME/COAG  - Transfusion parameters: hgb <7g/dL and plts <10,000..   - Anemia and thrombocytopenia secondary to BMT.      3. ID.   Neutropenic fever/colitis- s/p cefepime. Continue cipro/flagyl through 11/26.   - facial pain, sinus CT with mild inflammatory sinusitis but no acute sinusitis. No additional tx, anticipate this will improve as counts recover. Viral PCR panel and covid negative on admission  Prophy:  Fluc, ACV. Bactrim for PCP prophy to start at day +28     4. GI/NUTRITION  - Abdominal discomfort: CT A/P suggestive infectious/inflammatory enteritis/colitis. Continues on oral antibiotics cipro/flagyl through 11/26.   - DAYNA: schedule Zofran, compazine prn.    - loose stools, cdiff neg 11/20. Imodium prn. Taper as able   - Ulcer prophy: Protonix 40mg daily.      5. CV  - Hx of CAD, MI at age 28 with stent placed  - HTN: losartan and hydrochlorothiazide on hold. Continue to hold for now.   - Hyperlipidemia: hold statin through transplant     6. RENAL/FEN  - Cr stable  - hypokalemia: on PO k+ 20mEq daily. Potassium normal today.   - Hypophosphatemia, likely secondary to engraftment. On PO phos packets QID. Improving, can likely stop these in a few days if phos is normal as intake is improving.      7. ENDOCRINE  - hx hypothyroidism, cont synthroid  - new diagnosis of papillary thyroid carcinoma per bx result from 11/2,  appears that the plan is for thyroidectomy after BMT     8. Neuro  - peripheral neuropathy: gabapentin 300mg BID.  His feet felt worse on higher dose, he reports.   - start B complex vitamin (contains vit C) about 2 weeks post transplant to avoid interaction with chemo. Will re-visit this next week.     RTC: Friday for labs, provider visit and infusion for possible lytes. If lytes still stable will give pt a few days off.     30 minutes spent on the date of the encounter doing chart review, history and exam, documentation and further activities per the note      Shukri Moffett PA-C  [id number]

## 2021-11-24 NOTE — LETTER
11/24/2021         RE: June Ronquillo  [address]  [facility] [address]        Dear Colleague,    Thank you for referring your patient, June Ronquillo, to the [facility] BLOOD AND MARROW TRANSPLANT PROGRAM [place]. Please see a copy of my visit note below.    Lab results and vital signs monitored and patient above parameters for infusion/transfusion today.  Pt assessed in clinic.  Kristen Vicente RN            Again, thank you for allowing me to participate in the care of your patient.        Sincerely,        [facility]

## 2021-11-24 NOTE — PROGRESS NOTES
Lab results and vital signs monitored and patient above parameters for infusion/transfusion today.  Pt assessed in clinic.  Kristen Vicente RN

## 2021-11-24 NOTE — NURSING NOTE
"Oncology Rooming Note    November 24, 2021 12:14 PM   Kotaapamatthias Ronquillo is a 51 year old male who presents for:    Chief Complaint   Patient presents with     Labs Only     cvc,m heparin vitals checked     RECHECK     return visit day +13 s/p BMT     Initial Vitals: /80   Pulse 94   Temp 97.9  F (36.6  C)   Resp 18   Wt 73.5 kg (162 lb)   SpO2 100%   BMI 26.96 kg/m   Estimated body mass index is 26.96 kg/m  as calculated from the following:    Height as of 11/20/21: 1.651 m (5' 5\").    Weight as of this encounter: 73.5 kg (162 lb). Body surface area is 1.84 meters squared.  Moderate Pain (4) Comment: Data Unavailable   No LMP for male patient.  Allergies reviewed: Yes  Medications reviewed: Yes    Medications: Medication refills not needed today.  Pharmacy name entered into [employer]:    [facility] [place], [place] - [address] [facility] [place], [place] - [address] [place] [address]    Clinical concerns: None       Mary Coyle RN            "

## 2021-11-24 NOTE — NURSING NOTE
Chief Complaint   Patient presents with     Labs Only     cvc,m heparin vitals checked     Alyce Gibbs RN on 11/24/2021 at 12:02 PM

## 2021-11-25 LAB
BACTERIA BLD CULT: NO GROWTH
BACTERIA BLD CULT: NO GROWTH
BLD PROD TYP BPU: NORMAL
BLOOD COMPONENT TYPE: NORMAL
CODING SYSTEM: NORMAL
INTERPRETATION: NORMAL
UNIT ABO/RH: NORMAL
UNIT NUMBER: NORMAL
UNIT STATUS: NORMAL
UNIT TYPE ISBT: 6200

## 2021-11-25 RX ORDER — HEPARIN SODIUM (PORCINE) LOCK FLUSH IV SOLN 100 UNIT/ML 100 UNIT/ML
5 SOLUTION INTRAVENOUS
Status: CANCELLED | OUTPATIENT
Start: 2021-11-25

## 2021-11-25 RX ORDER — HEPARIN SODIUM,PORCINE 10 UNIT/ML
5 VIAL (ML) INTRAVENOUS
Status: CANCELLED | OUTPATIENT
Start: 2021-11-25

## 2021-11-25 NOTE — PROGRESS NOTES
BMT Progress Note       ID: Jitendrapal KINDRA Ronquillo is a 52 yo man D+15 s/p auto PBSCT for high risk MM. Re-admitted with neutropenic fevers, abdominal pain, nausea and diarrhea, now discharged.      HPI: Mr Ronquillo and his wife present today for follow up as planned. He is feeling overall better, no n/v/d. Stools now formed. Poor appetite but trying to push foods, maybe 50% of normal intake. Persistent headache since last admission still there, he feels it is similar to headaches prior to his HTN therapy, he is interested in resuming one of his BP meds.     ROS: Negative except as stated above in HPI     Physical Exam  /87   Pulse 98   Temp 98.1  F (36.7  C) (Oral)   Resp 16   Wt 72.6 kg (160 lb)   SpO2 98%   BMI 26.63 kg/m      Wt Readings from Last 5 Encounters:   11/26/21 72.6 kg (160 lb)   11/24/21 73.5 kg (162 lb)   11/23/21 71.8 kg (158 lb 4.8 oz)   11/19/21 72.1 kg (159 lb)   11/18/21 72.3 kg (159 lb 6.4 oz)        General: NAD,   Eyes: sclera anicteric   Nose/Mouth/Throat: OP dry, no ulcerations   Lungs: CTA bilaterally  Cardiovascular: RRR, no M/R/G   Abdominal/Rectal: +BS, soft, no tenderness to palpation  Lymphatics: No edema  Skin: No rashes  Neuro: A&O   Access: CVC site NT, no drainage.     Labs:  Lab Results   Component Value Date    WBC 7.9 11/26/2021    ANEU 4.7 11/26/2021    HGB 9.9 (L) 11/26/2021    HCT 28.9 (L) 11/26/2021    PLT 92 (L) 11/26/2021     11/26/2021    POTASSIUM 3.6 11/26/2021    CHLORIDE 104 11/26/2021    CO2 22 11/26/2021     (H) 11/26/2021    BUN 6 (L) 11/26/2021    CR 0.69 11/26/2021    MAG 1.7 11/24/2021    INR 1.16 (H) 11/23/2021    BILITOTAL 0.2 11/26/2021    AST 19 11/26/2021    ALT 18 11/26/2021    ALKPHOS 75 11/26/2021    PROTTOTAL 5.9 (L) 11/26/2021    ALBUMIN 3.0 (L) 11/26/2021       I have assessed all abnormal lab values for their clinical significance and any values considered clinically significant have been addressed in the assessment and plan.           A/P: June Ronquillo is a 50 yo man D+15 s/p auto PBSCT for high risk MM      1. BMT  - BMT doc/Coordinator: Miguelangel/Du Newsome; Lorelei/Christine  - Cell dose 6.99 x 10^6; gave all cells due to high risk disease  - GCSF completed 11/23.      2. HEME/COAG  - Transfusion parameters: hgb <7g/dL and plts <10,000..   - Anemia and thrombocytopenia secondary to BMT.      3. ID.   Neutropenic fever/colitis- s/p cefepime. Finishes cipro/flagyl today 11/26.   - facial pain, sinus CT with mild inflammatory sinusitis but no acute sinusitis. No additional tx, anticipate this will improve as counts recover. Viral PCR panel and covid negative on admission  Prophy:  Fluc, ACV. Bactrim for PCP prophy to start at day +28     4. GI/NUTRITION  - Abdominal discomfort: CT A/P suggestive infectious/inflammatory enteritis/colitis. Compoleted oral antibiotics cipro/flagyl as above  - DAYNA: schedule Zofran, compazine prn.    - loose stools, cdiff neg 11/20. Imodium prn. Taper as able   - Ulcer prophy: Protonix 40mg daily.      5. CV  - Hx of CAD, MI at age 28 with stent placed  - HTN: 11/26 resume losartan at 25mg/day (previous dose 50mg/day). Cont to hold hydrochlorothiazide   - Hyperlipidemia: hold statin through transplant     6. RENAL/FEN  - Cr stable  - hypokalemia: on PO k+ 20mEq daily. Potassium normal today.   - Hypophosphatemia, likely secondary to engraftment. On PO phos packets QID, as intake improves, ok to discontinue these. Intake likely improve off flagyl as well.    7. ENDOCRINE  - hx hypothyroidism, cont synthroid  - new diagnosis of papillary thyroid carcinoma per bx result from 11/2, appears that the plan is for thyroidectomy after BMT     8. Neuro  - peripheral neuropathy: gabapentin 300mg BID.  His feet felt worse on higher dose, he reports.   - start B complex vitamin (contains vit C) about 2 weeks post transplant to avoid interaction with chemo.      Plan: Resume losartan at 25mg/day likely return to historical  dose 50mg next visit  Stop cipro/flagyl as planned  If drinking 2 supplements/day, ok to hold phos packets     RTC: Monday, COVID swab prior to line removal (not yet ordered as date not yet set)    30 minutes spent on the date of the encounter doing chart review, history and exam, documentation and further activities per the note      Deborah Carmona PAC  [phone]

## 2021-11-26 ENCOUNTER — INFUSION THERAPY VISIT (OUTPATIENT)
Dept: TRANSPLANT | Facility: CLINIC | Age: 51
End: 2021-11-26
Attending: INTERNAL MEDICINE
Payer: COMMERCIAL

## 2021-11-26 ENCOUNTER — LAB (OUTPATIENT)
Dept: LAB | Facility: CLINIC | Age: 51
End: 2021-11-26
Attending: INTERNAL MEDICINE
Payer: COMMERCIAL

## 2021-11-26 VITALS
WEIGHT: 160 LBS | SYSTOLIC BLOOD PRESSURE: 134 MMHG | TEMPERATURE: 98.1 F | HEART RATE: 98 BPM | OXYGEN SATURATION: 98 % | BODY MASS INDEX: 26.63 KG/M2 | RESPIRATION RATE: 16 BRPM | DIASTOLIC BLOOD PRESSURE: 87 MMHG

## 2021-11-26 DIAGNOSIS — C90.00 MULTIPLE MYELOMA NOT HAVING ACHIEVED REMISSION (H): Primary | ICD-10-CM

## 2021-11-26 DIAGNOSIS — Z52.011 AUTOLOGOUS DONOR, STEM CELLS: Primary | ICD-10-CM

## 2021-11-26 DIAGNOSIS — Z91.199 FAILURE TO ATTEND APPOINTMENT: ICD-10-CM

## 2021-11-26 DIAGNOSIS — Z11.59 ENCOUNTER FOR SCREENING FOR OTHER VIRAL DISEASES: ICD-10-CM

## 2021-11-26 LAB
ABO/RH(D): ABNORMAL
ALBUMIN SERPL-MCNC: 3 G/DL (ref 3.4–5)
ALP SERPL-CCNC: 75 U/L (ref 40–150)
ALT SERPL W P-5'-P-CCNC: 18 U/L (ref 0–70)
ANION GAP SERPL CALCULATED.3IONS-SCNC: 10 MMOL/L (ref 3–14)
ANTIBODY SCREEN, TUBE: NORMAL
ANTIBODY SCREEN: POSITIVE
AST SERPL W P-5'-P-CCNC: 19 U/L (ref 0–45)
BASOPHILS # BLD MANUAL: 0.1 10E3/UL (ref 0–0.2)
BASOPHILS NFR BLD MANUAL: 1 %
BILIRUB SERPL-MCNC: 0.2 MG/DL (ref 0.2–1.3)
BUN SERPL-MCNC: 6 MG/DL (ref 7–30)
CALCIUM SERPL-MCNC: 9 MG/DL (ref 8.5–10.1)
CHLORIDE BLD-SCNC: 104 MMOL/L (ref 94–109)
CO2 SERPL-SCNC: 22 MMOL/L (ref 20–32)
CREAT SERPL-MCNC: 0.69 MG/DL (ref 0.66–1.25)
EOSINOPHIL # BLD MANUAL: 0 10E3/UL (ref 0–0.7)
EOSINOPHIL NFR BLD MANUAL: 0 %
ERYTHROCYTE [DISTWIDTH] IN BLOOD BY AUTOMATED COUNT: 15.2 % (ref 10–15)
FRAGMENTS BLD QL SMEAR: SLIGHT
GFR SERPL CREATININE-BSD FRML MDRD: >90 ML/MIN/1.73M2
GLUCOSE BLD-MCNC: 136 MG/DL (ref 70–99)
HCT VFR BLD AUTO: 28.9 % (ref 40–53)
HGB BLD-MCNC: 9.9 G/DL (ref 13.3–17.7)
LYMPHOCYTES # BLD MANUAL: 0.9 10E3/UL (ref 0.8–5.3)
LYMPHOCYTES NFR BLD MANUAL: 11 %
MCH RBC QN AUTO: 30.1 PG (ref 26.5–33)
MCHC RBC AUTO-ENTMCNC: 34.3 G/DL (ref 31.5–36.5)
MCV RBC AUTO: 88 FL (ref 78–100)
METAMYELOCYTES # BLD MANUAL: 0.1 10E3/UL
METAMYELOCYTES NFR BLD MANUAL: 1 %
MONOCYTES # BLD MANUAL: 2.1 10E3/UL (ref 0–1.3)
MONOCYTES NFR BLD MANUAL: 26 %
MYELOCYTES # BLD MANUAL: 0.1 10E3/UL
MYELOCYTES NFR BLD MANUAL: 1 %
NEUTROPHILS # BLD MANUAL: 4.7 10E3/UL (ref 1.6–8.3)
NEUTROPHILS NFR BLD MANUAL: 59 %
PHOSPHATE SERPL-MCNC: 2.3 MG/DL (ref 2.5–4.5)
PLAT MORPH BLD: ABNORMAL
PLATELET # BLD AUTO: 92 10E3/UL (ref 150–450)
POLYCHROMASIA BLD QL SMEAR: SLIGHT
POTASSIUM BLD-SCNC: 3.6 MMOL/L (ref 3.4–5.3)
PROMYELOCYTES # BLD MANUAL: 0.1 10E3/UL
PROMYELOCYTES NFR BLD MANUAL: 1 %
PROT SERPL-MCNC: 5.9 G/DL (ref 6.8–8.8)
RBC # BLD AUTO: 3.29 10E6/UL (ref 4.4–5.9)
RBC MORPH BLD: ABNORMAL
SODIUM SERPL-SCNC: 136 MMOL/L (ref 133–144)
SPECIMEN EXPIRATION DATE: ABNORMAL
SPECIMEN EXPIRATION DATE: NORMAL
WBC # BLD AUTO: 7.9 10E3/UL (ref 4–11)

## 2021-11-26 PROCEDURE — 86880 COOMBS TEST DIRECT: CPT

## 2021-11-26 PROCEDURE — 86870 RBC ANTIBODY IDENTIFICATION: CPT | Performed by: INTERNAL MEDICINE

## 2021-11-26 PROCEDURE — 86901 BLOOD TYPING SEROLOGIC RH(D): CPT

## 2021-11-26 PROCEDURE — 86900 BLOOD TYPING SEROLOGIC ABO: CPT

## 2021-11-26 PROCEDURE — 85027 COMPLETE CBC AUTOMATED: CPT

## 2021-11-26 PROCEDURE — G0463 HOSPITAL OUTPT CLINIC VISIT: HCPCS

## 2021-11-26 PROCEDURE — 250N000011 HC RX IP 250 OP 636: Performed by: INTERNAL MEDICINE

## 2021-11-26 PROCEDURE — 99214 OFFICE O/P EST MOD 30 MIN: CPT

## 2021-11-26 PROCEDURE — 86870 RBC ANTIBODY IDENTIFICATION: CPT

## 2021-11-26 PROCEDURE — 84999 UNLISTED CHEMISTRY PROCEDURE: CPT

## 2021-11-26 PROCEDURE — 84100 ASSAY OF PHOSPHORUS: CPT

## 2021-11-26 PROCEDURE — 36592 COLLECT BLOOD FROM PICC: CPT

## 2021-11-26 PROCEDURE — 86970 RBC PRETX INCUBATJ W/CHEMICL: CPT

## 2021-11-26 PROCEDURE — 86850 RBC ANTIBODY SCREEN: CPT

## 2021-11-26 PROCEDURE — 80053 COMPREHEN METABOLIC PANEL: CPT

## 2021-11-26 RX ORDER — LOSARTAN POTASSIUM 50 MG/1
50 TABLET ORAL DAILY
Qty: 30 TABLET | Refills: 1 | COMMUNITY
Start: 2021-11-26 | End: 2021-11-26

## 2021-11-26 RX ORDER — HEPARIN SODIUM,PORCINE 10 UNIT/ML
5 VIAL (ML) INTRAVENOUS
Status: DISCONTINUED | OUTPATIENT
Start: 2021-11-26 | End: 2021-11-26 | Stop reason: HOSPADM

## 2021-11-26 RX ORDER — HEPARIN SODIUM,PORCINE 10 UNIT/ML
5 VIAL (ML) INTRAVENOUS ONCE
Status: COMPLETED | OUTPATIENT
Start: 2021-11-26 | End: 2021-11-26

## 2021-11-26 RX ORDER — LOSARTAN POTASSIUM 50 MG/1
25 TABLET ORAL DAILY
Qty: 30 TABLET | Refills: 1 | COMMUNITY
Start: 2021-11-26 | End: 2021-12-03

## 2021-11-26 RX ADMIN — Medication 5 ML: at 10:08

## 2021-11-26 RX ADMIN — Medication 5 ML: at 09:41

## 2021-11-26 ASSESSMENT — PAIN SCALES - GENERAL: PAINLEVEL: MODERATE PAIN (4)

## 2021-11-26 NOTE — LETTER
11/26/2021         RE: June Ronquillo  [address]  [facility] [address]        Dear Colleague,    Thank you for referring your patient, June Ronquillo, to the [facility] BLOOD AND MARROW TRANSPLANT PROGRAM [place]. Please see a copy of my visit note below.    BMT Progress Note       ID: June Ronquillo is a 50 yo man D+15 s/p auto PBSCT for high risk MM. Re-admitted with neutropenic fevers, abdominal pain, nausea and diarrhea, now discharged.      HPI: Mr Ronquillo and his wife present today for follow up as planned. He is feeling overall better, no n/v/d. Stools now formed. Poor appetite but trying to push foods, maybe 50% of normal intake. Persistent headache since last admission still there, he feels it is similar to headaches prior to his HTN therapy, he is interested in resuming one of his BP meds.     ROS: Negative except as stated above in HPI     Physical Exam  /87   Pulse 98   Temp 98.1  F (36.7  C) (Oral)   Resp 16   Wt 72.6 kg (160 lb)   SpO2 98%   BMI 26.63 kg/m      Wt Readings from Last 5 Encounters:   11/26/21 72.6 kg (160 lb)   11/24/21 73.5 kg (162 lb)   11/23/21 71.8 kg (158 lb 4.8 oz)   11/19/21 72.1 kg (159 lb)   11/18/21 72.3 kg (159 lb 6.4 oz)     General: NAD,   Eyes: sclera anicteric   Nose/Mouth/Throat: OP dry, no ulcerations   Lungs: CTA bilaterally  Cardiovascular: RRR, no M/R/G   Abdominal/Rectal: +BS, soft, no tenderness to palpation  Lymphatics: No edema  Skin: No rashes  Neuro: A&O   Access: CVC site NT, no drainage.     Labs:  Lab Results   Component Value Date    WBC 7.9 11/26/2021    ANEU 4.7 11/26/2021    HGB 9.9 (L) 11/26/2021    HCT 28.9 (L) 11/26/2021    PLT 92 (L) 11/26/2021     11/26/2021    POTASSIUM 3.6 11/26/2021    CHLORIDE 104 11/26/2021    CO2 22 11/26/2021     (H) 11/26/2021    BUN 6 (L) 11/26/2021    CR 0.69 11/26/2021    MAG 1.7 11/24/2021    INR 1.16 (H) 11/23/2021    BILITOTAL 0.2 11/26/2021    AST 19  11/26/2021    ALT 18 11/26/2021    ALKPHOS 75 11/26/2021    PROTTOTAL 5.9 (L) 11/26/2021    ALBUMIN 3.0 (L) 11/26/2021       I have assessed all abnormal lab values for their clinical significance and any values considered clinically significant have been addressed in the assessment and plan.          A/P: June Ronquillo is a 52 yo man D+15 s/p auto PBSCT for high risk MM      1. BMT  - BMT doc/Coordinator: Miguelangel/Du Newsome; Lorelei/Christine  - Cell dose 6.99 x 10^6; gave all cells due to high risk disease  - GCSF completed 11/23.      2. HEME/COAG  - Transfusion parameters: hgb <7g/dL and plts <10,000..   - Anemia and thrombocytopenia secondary to BMT.      3. ID.   Neutropenic fever/colitis- s/p cefepime. Finishes cipro/flagyl today 11/26.   - facial pain, sinus CT with mild inflammatory sinusitis but no acute sinusitis. No additional tx, anticipate this will improve as counts recover. Viral PCR panel and covid negative on admission  Prophy:  Fluc, ACV. Bactrim for PCP prophy to start at day +28     4. GI/NUTRITION  - Abdominal discomfort: CT A/P suggestive infectious/inflammatory enteritis/colitis. Compoleted oral antibiotics cipro/flagyl as above  - DAYNA: schedule Zofran, compazine prn.    - loose stools, cdiff neg 11/20. Imodium prn. Taper as able   - Ulcer prophy: Protonix 40mg daily.      5. CV  - Hx of CAD, MI at age 28 with stent placed  - HTN: 11/26 resume losartan at 25mg/day (previous dose 50mg/day). Cont to hold hydrochlorothiazide   - Hyperlipidemia: hold statin through transplant     6. RENAL/FEN  - Cr stable  - hypokalemia: on PO k+ 20mEq daily. Potassium normal today.   - Hypophosphatemia, likely secondary to engraftment. On PO phos packets QID, as intake improves, ok to discontinue these. Intake likely improve off flagyl as well.    7. ENDOCRINE  - hx hypothyroidism, cont synthroid  - new diagnosis of papillary thyroid carcinoma per bx result from 11/2, appears that the plan is for thyroidectomy  after BMT     8. Neuro  - peripheral neuropathy: gabapentin 300mg BID.  His feet felt worse on higher dose, he reports.   - start B complex vitamin (contains vit C) about 2 weeks post transplant to avoid interaction with chemo.    Plan: Resume losartan at 25mg/day likely return to historical dose 50mg next visit  Stop cipro/flagyl as planned  If drinking 2 supplements/day, ok to hold phos packets     RTC: Monday, COVID swab prior to line removal (not yet ordered as date not yet set)    30 minutes spent on the date of the encounter doing chart review, history and exam, documentation and further activities per the note      Deborah Carmona PAC  [phone]

## 2021-11-26 NOTE — LETTER
11/26/2021         RE: June Ronquillo  [address]  [facility] [address]        Dear Colleague,    Thank you for referring your patient, June Ronquillo, to the [facility] BLOOD AND MARROW TRANSPLANT PROGRAM [place]. Please see a copy of my visit note below.      This encounter was opened in error. Please disregard.      Again, thank you for allowing me to participate in the care of your patient.        Sincerely,        [facility]

## 2021-11-26 NOTE — NURSING NOTE
Dressing change completed sterile technique used. Site WDL. Patient tolerated dressing change.  See Dock Flowsheet.     Juan Saenz LPN

## 2021-11-26 NOTE — NURSING NOTE
Chief Complaint   Patient presents with     Blood Draw     Labs drawn via CVC by RN in lab. VS taken.      Michael Marin RN

## 2021-11-26 NOTE — NURSING NOTE
Collected lab from central line. Line was flushed with NS and heparin. Pt tolerated well.    Saúl Deluna RN

## 2021-11-26 NOTE — NURSING NOTE
"Oncology Rooming Note    November 26, 2021 10:11 AM   June Ronquillo is a 51 year old male who presents for:    Chief Complaint   Patient presents with     Blood Draw     Labs drawn via CVC by RN in lab. VS taken.      Oncology Clinic Visit     MULTIPLE MYELOMA     Initial Vitals: /87   Pulse 98   Temp 98.1  F (36.7  C) (Oral)   Resp 16   Wt 72.6 kg (160 lb)   SpO2 98%   BMI 26.63 kg/m   Estimated body mass index is 26.63 kg/m  as calculated from the following:    Height as of 11/20/21: 1.651 m (5' 5\").    Weight as of this encounter: 72.6 kg (160 lb). Body surface area is 1.82 meters squared.  Moderate Pain (4) Comment: Data Unavailable   No LMP for male patient.  Allergies reviewed: Yes  Medications reviewed: Yes    Medications: Medication refills not needed today.  Pharmacy name entered into [employer]:    [facility] [place], [place] - [address] [facility] [place], [place] - [address] [place], [place] [address] .    Clinical concerns: Ongoing headache. Notes BP has been running high. Meds are not helpful for headache.        La Kennedy CMA            "

## 2021-11-29 ENCOUNTER — PREP FOR PROCEDURE (OUTPATIENT)
Dept: OTOLARYNGOLOGY | Facility: CLINIC | Age: 51
End: 2021-11-29

## 2021-11-29 ENCOUNTER — APPOINTMENT (OUTPATIENT)
Dept: LAB | Facility: CLINIC | Age: 51
End: 2021-11-29
Attending: STUDENT IN AN ORGANIZED HEALTH CARE EDUCATION/TRAINING PROGRAM
Payer: COMMERCIAL

## 2021-11-29 ENCOUNTER — PRE VISIT (OUTPATIENT)
Dept: OTOLARYNGOLOGY | Facility: CLINIC | Age: 51
End: 2021-11-29

## 2021-11-29 ENCOUNTER — OFFICE VISIT (OUTPATIENT)
Dept: OTOLARYNGOLOGY | Facility: CLINIC | Age: 51
End: 2021-11-29
Payer: COMMERCIAL

## 2021-11-29 ENCOUNTER — ONCOLOGY VISIT (OUTPATIENT)
Dept: TRANSPLANT | Facility: CLINIC | Age: 51
End: 2021-11-29
Attending: STUDENT IN AN ORGANIZED HEALTH CARE EDUCATION/TRAINING PROGRAM
Payer: COMMERCIAL

## 2021-11-29 VITALS
SYSTOLIC BLOOD PRESSURE: 126 MMHG | DIASTOLIC BLOOD PRESSURE: 85 MMHG | TEMPERATURE: 98.3 F | RESPIRATION RATE: 16 BRPM | HEART RATE: 118 BPM | OXYGEN SATURATION: 99 % | WEIGHT: 157 LBS | BODY MASS INDEX: 26.13 KG/M2

## 2021-11-29 VITALS
BODY MASS INDEX: 26.24 KG/M2 | OXYGEN SATURATION: 99 % | HEART RATE: 118 BPM | RESPIRATION RATE: 16 BRPM | TEMPERATURE: 98.3 F | SYSTOLIC BLOOD PRESSURE: 126 MMHG | WEIGHT: 157.7 LBS | DIASTOLIC BLOOD PRESSURE: 85 MMHG

## 2021-11-29 DIAGNOSIS — C90.00 MULTIPLE MYELOMA NOT HAVING ACHIEVED REMISSION (H): ICD-10-CM

## 2021-11-29 DIAGNOSIS — E04.1 THYROID NODULE: ICD-10-CM

## 2021-11-29 LAB
ANION GAP SERPL CALCULATED.3IONS-SCNC: 9 MMOL/L (ref 3–14)
BASOPHILS # BLD MANUAL: 0 10E3/UL (ref 0–0.2)
BASOPHILS NFR BLD MANUAL: 0 %
BLASTS # BLD MANUAL: 0.1 10E3/UL
BLASTS NFR BLD MANUAL: 1 %
BUN SERPL-MCNC: 11 MG/DL (ref 7–30)
CALCIUM SERPL-MCNC: 8.9 MG/DL (ref 8.5–10.1)
CHLORIDE BLD-SCNC: 104 MMOL/L (ref 94–109)
CO2 SERPL-SCNC: 23 MMOL/L (ref 20–32)
CREAT SERPL-MCNC: 0.72 MG/DL (ref 0.66–1.25)
EOSINOPHIL # BLD MANUAL: 0 10E3/UL (ref 0–0.7)
EOSINOPHIL NFR BLD MANUAL: 0 %
ERYTHROCYTE [DISTWIDTH] IN BLOOD BY AUTOMATED COUNT: 16.1 % (ref 10–15)
GFR SERPL CREATININE-BSD FRML MDRD: >90 ML/MIN/1.73M2
GLUCOSE BLD-MCNC: 134 MG/DL (ref 70–99)
HCT VFR BLD AUTO: 30.4 % (ref 40–53)
HGB BLD-MCNC: 10.3 G/DL (ref 13.3–17.7)
LYMPHOCYTES # BLD MANUAL: 1.8 10E3/UL (ref 0.8–5.3)
LYMPHOCYTES NFR BLD MANUAL: 16 %
MCH RBC QN AUTO: 30 PG (ref 26.5–33)
MCHC RBC AUTO-ENTMCNC: 33.9 G/DL (ref 31.5–36.5)
MCV RBC AUTO: 89 FL (ref 78–100)
MONOCYTES # BLD MANUAL: 1.8 10E3/UL (ref 0–1.3)
MONOCYTES NFR BLD MANUAL: 16 %
MYELOCYTES # BLD MANUAL: 0.2 10E3/UL
MYELOCYTES NFR BLD MANUAL: 2 %
NEUTROPHILS # BLD MANUAL: 7.4 10E3/UL (ref 1.6–8.3)
NEUTROPHILS NFR BLD MANUAL: 65 %
PLAT MORPH BLD: ABNORMAL
PLATELET # BLD AUTO: 317 10E3/UL (ref 150–450)
POTASSIUM BLD-SCNC: 3.9 MMOL/L (ref 3.4–5.3)
RBC # BLD AUTO: 3.43 10E6/UL (ref 4.4–5.9)
RBC MORPH BLD: ABNORMAL
SODIUM SERPL-SCNC: 136 MMOL/L (ref 133–144)
WBC # BLD AUTO: 11.4 10E3/UL (ref 4–11)

## 2021-11-29 PROCEDURE — 99202 OFFICE O/P NEW SF 15 MIN: CPT | Performed by: SURGERY

## 2021-11-29 PROCEDURE — 99214 OFFICE O/P EST MOD 30 MIN: CPT

## 2021-11-29 PROCEDURE — 250N000011 HC RX IP 250 OP 636: Performed by: STUDENT IN AN ORGANIZED HEALTH CARE EDUCATION/TRAINING PROGRAM

## 2021-11-29 PROCEDURE — 36592 COLLECT BLOOD FROM PICC: CPT

## 2021-11-29 PROCEDURE — G0463 HOSPITAL OUTPT CLINIC VISIT: HCPCS

## 2021-11-29 PROCEDURE — U0003 INFECTIOUS AGENT DETECTION BY NUCLEIC ACID (DNA OR RNA); SEVERE ACUTE RESPIRATORY SYNDROME CORONAVIRUS 2 (SARS-COV-2) (CORONAVIRUS DISEASE [COVID-19]), AMPLIFIED PROBE TECHNIQUE, MAKING USE OF HIGH THROUGHPUT TECHNOLOGIES AS DESCRIBED BY CMS-2020-01-R: HCPCS

## 2021-11-29 PROCEDURE — 85027 COMPLETE CBC AUTOMATED: CPT

## 2021-11-29 PROCEDURE — 80048 BASIC METABOLIC PNL TOTAL CA: CPT

## 2021-11-29 RX ORDER — HEPARIN SODIUM,PORCINE 10 UNIT/ML
5 VIAL (ML) INTRAVENOUS ONCE
Status: COMPLETED | OUTPATIENT
Start: 2021-11-29 | End: 2021-11-29

## 2021-11-29 RX ADMIN — Medication 5 ML: at 14:13

## 2021-11-29 RX ADMIN — Medication 5 ML: at 14:12

## 2021-11-29 ASSESSMENT — PAIN SCALES - GENERAL
PAINLEVEL: MILD PAIN (2)
PAINLEVEL: MILD PAIN (2)

## 2021-11-29 NOTE — PROGRESS NOTES
SURGERY CLINIC CONSULTATION    REASON FOR CONSULTATION:  June Ronquillo was referred by Dr. Elder for evaluation and discussion of treatment options for thyroid nodule     HISTORY OF PRESENT ILLNESS:  June Ronquillo is a 51 year old male who has recently undergone bone marrow SCT for multiple myeloma. CT scan identified left thyroid nodules. This was followed by an US thyroid and FNA left nodule was (+) for PTC    Regarding the thyroid nodule, the patient denies any problems with voice quality, inspiration or swallowing. No sx of hypo or hyperthyroidism. No previous HN XRT or previous family/personal h/o PTC      REVIEW OF SYSTEMS:  ROS EXAM: 10pt ROS pertinent for that noted in HPI  Patient Active Problem List   Diagnosis     CARDIOVASCULAR SCREENING; LDL GOAL LESS THAN 100     FH: heart disease     Overweight (BMI 25.0-29.9)     Hyperlipidemia LDL goal <100     Concussion without loss of consciousness, initial encounter     Concussion without loss of consciousness, subsequent encounter     Hypertension     CAD (coronary artery disease)     Pre-diabetes     Increased thyroid stimulating hormone (TSH) level     Increased liver enzymes     Hepatitis A antibody positive     Hypothyroidism due to Hashimoto's thyroiditis     Multiple myeloma not having achieved remission (H)     Keratoconus, stable condition     Autologous donor, stem cells     Febrile neutropenia (H)     Neutropenic fever (H)     History of peripheral stem cell transplant (H)       Past Surgical History:   Procedure Laterality Date     BONE MARROW BIOPSY, BONE SPECIMEN, NEEDLE/TROCAR Left 5/17/2021    Procedure: BIOPSY, BONE MARROW with aspiration and ancillary studies;  Surgeon: Niharika Regalado MD;  Location: [facility] OR     BONE MARROW BIOPSY, BONE SPECIMEN, NEEDLE/TROCAR Left 10/14/2021    Procedure: BIOPSY, BONE MARROW;  Surgeon: Alexa Albert APRN CNP;  Location: [facility] OR     HEART CATH PTCA SINGLE VESSEL  10/10/2004     Stent to CFX - [employer]      INSERT CATHETER VASCULAR ACCESS Right 11/3/2021    Procedure: NON VALVED TUNNELED DUAL LUMEN APHARESIS CAPABLE LINE INSERTION, VASCULAR ACCESS CATHETER;  Surgeon: Werner Mcnamara MD;  Location: [facility] OR     IR CVC TUNNEL PLACEMENT > 5 YRS OF AGE  11/3/2021     IR CVC TUNNEL REMOVAL RIGHT  12/2/2021       Allergies   Allergen Reactions     Blood Transfusion Related (Informational Only) Other (See Comments)     Patient has a history of a clinically significant antibody against RBC antigens.  A delay in compatible RBCs may occur.        Medications reviewed in EMR        Family History   Problem Relation Age of Onset     Hypertension Mother      Hyperlipidemia Mother      Heart Disease Paternal Grandmother      Hyperlipidemia Sister      Hyperlipidemia Sister           PHYSICAL EXAM:  /85   Pulse 118   Temp 98.3  F (36.8  C)   Resp 16   Wt 71.2 kg (157 lb)   SpO2 99%   BMI 26.13 kg/m      Neck: Thyroid gland -multiple small firm nodules L>R, moves well with swallowing. No cervical lymphadenopathy.     I personally reviewed the radiographic images and laboratory data    ASSESSMENT:   1. Thyroid nodule    2.      Thyroid cancer    PLAN:   With the patient h/o of diffuse radiation and bilateral thyroid nodules with FNA PTC, I recommend total thyroidectomy. The surgical procedure and risks were discussed with the patient including, but not limited to bleeding, infection, injury to the RLN(s), loss of airway or permanent hypocalcemia.     He is recovering from SCT and will schedule surgery after the new year.    Clarita Sepulveda MD

## 2021-11-29 NOTE — NURSING NOTE
Chief Complaint   Patient presents with     Consult     thyroid nodule      Blood pressure 126/85, pulse 118, temperature 98.3  F (36.8  C), resp. rate 16, weight 71.2 kg (157 lb), SpO2 99 %.    Andreas Smith LPN

## 2021-11-29 NOTE — NURSING NOTE
NP COVID-19 test done on patient per provider orders. NP swab done on right nostril. Last name and  verified. Swab sent to downstairs lab to process.    Dressing change was completed. Sterile technique was used. Site WDL. Patient tolerated dressing change well and had no questions. See Dock Flowsheet.     Elenita Gatica LPN

## 2021-11-29 NOTE — LETTER
11/29/2021         RE: June Ronquillo  [address]  [place] [address]        Dear Colleague,    Thank you for referring your patient, June Ronquillo, to the [facility] [place]. Please see a copy of my visit note below.    BMT Progress Note       ID: June Ronquillo is a 50 yo man D+18 s/p auto PBSCT for high risk MM. Re-admitted with neutropenic fevers, abdominal pain, nausea and diarrhea, now discharged.      HPI: Mr Ronquillo and his wife present today for follow up as planned. He is feeling overall better, no n/v/d. Stools now formed. Poor appetite but trying to push foods, maybe 50% of normal intake but not more. Feels like rock in his stomach for few hours after meals. Denies any fever. No abdominal pain. Food also doesn't taste right. He has not had any travel to [place] in 3 years.     ROS: Negative except as stated above in HPI     Physical Exam  /85 (BP Location: Right arm, Patient Position: Sitting, Cuff Size: Adult Regular)   Pulse 118   Temp 98.3  F (36.8  C) (Tympanic)   Resp 16   Wt 71.5 kg (157 lb 11.2 oz)   SpO2 99%   BMI 26.24 kg/m      Wt Readings from Last 5 Encounters:   11/29/21 71.5 kg (157 lb 11.2 oz)   11/26/21 72.6 kg (160 lb)   11/24/21 73.5 kg (162 lb)   11/23/21 71.8 kg (158 lb 4.8 oz)   11/19/21 72.1 kg (159 lb)        General: NAD,   Eyes: sclera anicteric   Nose/Mouth/Throat: OP dry, no ulcerations   Lungs: CTA bilaterally  Cardiovascular: RRR, no M/R/G   Abdominal/Rectal: +BS, soft, no tenderness to palpation  Lymphatics: No edema  Skin: No rashes  Neuro: A&O   Access: CVC site NT, no drainage.     Labs:  Lab Results   Component Value Date    WBC 7.9 11/26/2021    ANEU 4.7 11/26/2021    HGB 9.9 (L) 11/26/2021    HCT 28.9 (L) 11/26/2021    PLT 92 (L) 11/26/2021     11/26/2021    POTASSIUM 3.6 11/26/2021    CHLORIDE 104 11/26/2021    CO2 22 11/26/2021     (H) 11/26/2021    BUN 6 (L)  11/26/2021    CR 0.69 11/26/2021    MAG 1.7 11/24/2021    INR 1.16 (H) 11/23/2021    BILITOTAL 0.2 11/26/2021    AST 19 11/26/2021    ALT 18 11/26/2021    ALKPHOS 75 11/26/2021    PROTTOTAL 5.9 (L) 11/26/2021    ALBUMIN 3.0 (L) 11/26/2021       I have assessed all abnormal lab values for their clinical significance and any values considered clinically significant have been addressed in the assessment and plan.          A/P: Jitendrapal KINDRA Ronquillo is a 52 yo man D+18 s/p auto PBSCT for high risk MM      1. BMT  - BMT doc/Coordinator: Miguelangel/Du Newsome; Lorelei/Christine  - Cell dose 6.99 x 10^6; gave all cells due to high risk disease  - GCSF completed 11/23. 11/29 WBC 11.4 ?? Reactive-- see ID      2. HEME/COAG  - Transfusion parameters: hgb <7g/dL and plts <10,000..   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      3. ID.   Neutropenic fever/colitis- s/p cefepime. Finishes cipro/flagyl today 11/26.   - 11/29 New leukocytosis. No abdominal pain or recurrence of diarrhea. Did discuss with ID given hx of living in [facility] and reports Gi illness on subsequent visits back. ID felt given years since last travel, most likely just colitiis/typhitis causing issues. No need for O/P or other traveler GI illness work up. Jitendrapal will let us know if abdominal pain recurs.   - Stop porphy levaquin as not needed. If still with GI upset consider stopping acyclovir and trying valtrex as VZV prophy.   - facial pain, sinus CT with mild inflammatory sinusitis but no acute sinusitis. No additional tx, anticipate this will improve as counts recover. Viral PCR panel and covid negative on admission  Prophy:  Fluc, ACV. Bactrim for PCP prophy to start at day +28     4. GI/NUTRITION  - Abdominal discomfort: CT A/P suggestive infectious/inflammatory enteritis/colitis. Compoleted oral antibiotics cipro/flagyl as above  - DAYNA: schedule Zofran, compazine prn.    - loose stools, cdiff neg 11/20. Imodium prn. Taper as able   - Ulcer prophy: Protonix 40mg daily.      5.  CV  - Hx of CAD, MI at age 28 with stent placed  - HTN: 11/26 resume losartan at 25mg/day (previous dose 50mg/day). Cont to hold hydrochlorothiazide   - Hyperlipidemia: hold statin through transplant     6. RENAL/FEN  - Cr stable  - hypokalemia: on PO k+ 20mEq daily. Potassium normal today.   - Hypophosphatemia, likely secondary to engraftment. On PO phos packets QID, as intake improves, ok to discontinue these. Intake likely improve off flagyl as well.    7. ENDOCRINE  - hx hypothyroidism, cont synthroid  - new diagnosis of papillary thyroid carcinoma per bx result from 11/2, appears that the plan is for thyroidectomy after BMT     8. Neuro  - peripheral neuropathy: gabapentin 300mg BID.  His feet felt worse on higher dose, he reports.   - start B complex vitamin (contains vit C) about 2 weeks post transplant to avoid interaction with chemo.      Plan: Resume losartan at 25mg/day likely return to historical dose 50mg next visit  Stop cipro/flagyl as planned  If drinking 2 supplements/day, ok to hold phos packets     RTC:   COvid swab today.   - Line out 12/2.   RTC in 1 week to ensure eating/drinking improving and leukocytosis is improved. If worse or new abdominal pain would repeat CT abdomen.   30 minutes spent on the date of the encounter doing chart review, history and exam, documentation and further activities per the note      Gianna Mendoza PA-C  [phone]

## 2021-11-29 NOTE — PROGRESS NOTES
BMT Progress Note       ID: Jitendrapal KINDRA Ronquillo is a 52 yo man D+18 s/p auto PBSCT for high risk MM. Re-admitted with neutropenic fevers, abdominal pain, nausea and diarrhea, now discharged.      HPI: Mr Ronquillo and his wife present today for follow up as planned. He is feeling overall better, no n/v/d. Stools now formed. Poor appetite but trying to push foods, maybe 50% of normal intake but not more. Feels like rock in his stomach for few hours after meals. Denies any fever. No abdominal pain. Food also doesn't taste right. He has not had any travel to [place] in 3 years.     ROS: Negative except as stated above in HPI     Physical Exam  /85 (BP Location: Right arm, Patient Position: Sitting, Cuff Size: Adult Regular)   Pulse 118   Temp 98.3  F (36.8  C) (Tympanic)   Resp 16   Wt 71.5 kg (157 lb 11.2 oz)   SpO2 99%   BMI 26.24 kg/m      Wt Readings from Last 5 Encounters:   11/29/21 71.5 kg (157 lb 11.2 oz)   11/26/21 72.6 kg (160 lb)   11/24/21 73.5 kg (162 lb)   11/23/21 71.8 kg (158 lb 4.8 oz)   11/19/21 72.1 kg (159 lb)        General: NAD,   Eyes: sclera anicteric   Nose/Mouth/Throat: OP dry, no ulcerations   Lungs: CTA bilaterally  Cardiovascular: RRR, no M/R/G   Abdominal/Rectal: +BS, soft, no tenderness to palpation  Lymphatics: No edema  Skin: No rashes  Neuro: A&O   Access: CVC site NT, no drainage.     Labs:  Lab Results   Component Value Date    WBC 7.9 11/26/2021    ANEU 4.7 11/26/2021    HGB 9.9 (L) 11/26/2021    HCT 28.9 (L) 11/26/2021    PLT 92 (L) 11/26/2021     11/26/2021    POTASSIUM 3.6 11/26/2021    CHLORIDE 104 11/26/2021    CO2 22 11/26/2021     (H) 11/26/2021    BUN 6 (L) 11/26/2021    CR 0.69 11/26/2021    MAG 1.7 11/24/2021    INR 1.16 (H) 11/23/2021    BILITOTAL 0.2 11/26/2021    AST 19 11/26/2021    ALT 18 11/26/2021    ALKPHOS 75 11/26/2021    PROTTOTAL 5.9 (L) 11/26/2021    ALBUMIN 3.0 (L) 11/26/2021       I have assessed all abnormal lab values for their  clinical significance and any values considered clinically significant have been addressed in the assessment and plan.          A/P: Jitendrapal KINDRA Ronquillo is a 50 yo man D+18 s/p auto PBSCT for high risk MM      1. BMT  - BMT doc/Coordinator: Miguelangel/Du Newsome; Lorelei/Christine  - Cell dose 6.99 x 10^6; gave all cells due to high risk disease  - GCSF completed 11/23. 11/29 WBC 11.4 ?? Reactive-- see ID      2. HEME/COAG  - Transfusion parameters: hgb <7g/dL and plts <10,000..   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      3. ID.   Neutropenic fever/colitis- s/p cefepime. Finishes cipro/flagyl today 11/26.   - 11/29 New leukocytosis. No abdominal pain or recurrence of diarrhea. Did discuss with ID given hx of living in [facility] and reports Gi illness on subsequent visits back. ID felt given years since last travel, most likely just colitiis/typhitis causing issues. No need for O/P or other traveler GI illness work up. Jitendrapal will let us know if abdominal pain recurs.   - Stop porphy levaquin as not needed. If still with GI upset consider stopping acyclovir and trying valtrex as VZV prophy.   - facial pain, sinus CT with mild inflammatory sinusitis but no acute sinusitis. No additional tx, anticipate this will improve as counts recover. Viral PCR panel and covid negative on admission  Prophy:  Fluc, ACV. Bactrim for PCP prophy to start at day +28     4. GI/NUTRITION  - Abdominal discomfort: CT A/P suggestive infectious/inflammatory enteritis/colitis. Compoleted oral antibiotics cipro/flagyl as above  - DAYNA: schedule Zofran, compazine prn.    - loose stools, cdiff neg 11/20. Imodium prn. Taper as able   - Ulcer prophy: Protonix 40mg daily.      5. CV  - Hx of CAD, MI at age 28 with stent placed  - HTN: 11/26 resume losartan at 25mg/day (previous dose 50mg/day). Cont to hold hydrochlorothiazide   - Hyperlipidemia: hold statin through transplant     6. RENAL/FEN  - Cr stable  - hypokalemia: on PO k+ 20mEq daily. Potassium normal today.   -  Hypophosphatemia, likely secondary to engraftment. On PO phos packets QID, as intake improves, ok to discontinue these. Intake likely improve off flagyl as well.    7. ENDOCRINE  - hx hypothyroidism, cont synthroid  - new diagnosis of papillary thyroid carcinoma per bx result from 11/2, appears that the plan is for thyroidectomy after BMT     8. Neuro  - peripheral neuropathy: gabapentin 300mg BID.  His feet felt worse on higher dose, he reports.   - start B complex vitamin (contains vit C) about 2 weeks post transplant to avoid interaction with chemo.      Plan: Resume losartan at 25mg/day likely return to historical dose 50mg next visit  Stop cipro/flagyl as planned  If drinking 2 supplements/day, ok to hold phos packets     RTC:   COvid swab today.   - Line out 12/2.   RTC in 1 week to ensure eating/drinking improving and leukocytosis is improved. If worse or new abdominal pain would repeat CT abdomen.   30 minutes spent on the date of the encounter doing chart review, history and exam, documentation and further activities per the note      Gianna Mendoza PA-C  [phone]

## 2021-11-29 NOTE — LETTER
11/29/2021       RE: June Ronquillo  [address]  [facility] [address]     Dear Colleague,    Thank you for referring your patient, June Ronquillo, to the [facility] [place] [facility]. Please see a copy of my visit note below.    SURGERY CLINIC CONSULTATION    REASON FOR CONSULTATION:  June Ronquillo was referred by Dr. Elder for evaluation and discussion of treatment options for thyroid nodule     HISTORY OF PRESENT ILLNESS:  June Ronquillo is a 51 year old male who has recently undergone bone marrow SCT for multiple myeloma. CT scan identified left thyroid nodules. This was followed by an US thyroid and FNA left nodule was (+) for PTC    Regarding the thyroid nodule, the patient denies any problems with voice quality, inspiration or swallowing. No sx of hypo or hyperthyroidism. No previous HN XRT or previous family/personal h/o PTC      REVIEW OF SYSTEMS:  ROS EXAM: 10pt ROS pertinent for that noted in HPI  Patient Active Problem List   Diagnosis     CARDIOVASCULAR SCREENING; LDL GOAL LESS THAN 100     FH: heart disease     Overweight (BMI 25.0-29.9)     Hyperlipidemia LDL goal <100     Concussion without loss of consciousness, initial encounter     Concussion without loss of consciousness, subsequent encounter     Hypertension     CAD (coronary artery disease)     Pre-diabetes     Increased thyroid stimulating hormone (TSH) level     Increased liver enzymes     Hepatitis A antibody positive     Hypothyroidism due to Hashimoto's thyroiditis     Multiple myeloma not having achieved remission (H)     Keratoconus, stable condition     Autologous donor, stem cells     Febrile neutropenia (H)     Neutropenic fever (H)     History of peripheral stem cell transplant (H)       Past Surgical History:   Procedure Laterality Date     BONE MARROW BIOPSY, BONE SPECIMEN, NEEDLE/TROCAR Left 5/17/2021     Procedure: BIOPSY, BONE MARROW with aspiration and ancillary studies;  Surgeon: Niharika Regalado MD;  Location: [facility] OR     BONE MARROW BIOPSY, BONE SPECIMEN, NEEDLE/TROCAR Left 10/14/2021    Procedure: BIOPSY, BONE MARROW;  Surgeon: Alexa Albert APRN [facility];  Location: [facility] OR     HEART CATH PTCA SINGLE VESSEL  10/10/2004    Stent to [facility] [employer]      INSERT CATHETER VASCULAR ACCESS Right 11/3/2021    Procedure: NON VALVED TUNNELED DUAL LUMEN APHARESIS CAPABLE LINE INSERTION, VASCULAR ACCESS CATHETER;  Surgeon: Werner Mcnamara MD;  Location: [facility] OR     IR CVC TUNNEL PLACEMENT > 5 YRS OF AGE  11/3/2021     IR CVC TUNNEL REMOVAL RIGHT  12/2/2021       Allergies   Allergen Reactions     Blood Transfusion Related (Informational Only) Other (See Comments)     Patient has a history of a clinically significant antibody against RBC antigens.  A delay in compatible RBCs may occur.        Medications reviewed in EMR        Family History   Problem Relation Age of Onset     Hypertension Mother      Hyperlipidemia Mother      Heart Disease Paternal Grandmother      Hyperlipidemia Sister      Hyperlipidemia Sister           PHYSICAL EXAM:  /85   Pulse 118   Temp 98.3  F (36.8  C)   Resp 16   Wt 71.2 kg (157 lb)   SpO2 99%   BMI 26.13 kg/m      Neck: Thyroid gland -multiple small firm nodules L>R, moves well with swallowing. No cervical lymphadenopathy.     I personally reviewed the radiographic images and laboratory data    ASSESSMENT:   1. Thyroid nodule    2.      Thyroid cancer    PLAN:   With the patient h/o of diffuse radiation and bilateral thyroid nodules with FNA PTC, I recommend total thyroidectomy. The surgical procedure and risks were discussed with the patient including, but not limited to bleeding, infection, injury to the RLN(s), loss of airway or permanent hypocalcemia.     He is recovering from SCT and will schedule surgery after the new year.    Clarita Sepulveda,  MD

## 2021-11-29 NOTE — NURSING NOTE
"Oncology Rooming Note    November 29, 2021 2:50 PM   June Ronquillo is a 51 year old male who presents for:    Chief Complaint   Patient presents with     Blood Draw     Labs drawn via cvc by RN in lab. VS taken.      Oncology Clinic Visit     Multiple myeloma      Initial Vitals: /85 (BP Location: Right arm, Patient Position: Sitting, Cuff Size: Adult Regular)   Pulse 118   Temp 98.3  F (36.8  C) (Tympanic)   Resp 16   Wt 71.5 kg (157 lb 11.2 oz)   SpO2 99%   BMI 26.24 kg/m   Estimated body mass index is 26.24 kg/m  as calculated from the following:    Height as of 11/20/21: 1.651 m (5' 5\").    Weight as of this encounter: 71.5 kg (157 lb 11.2 oz). Body surface area is 1.81 meters squared.  Mild Pain (2) Comment: Data Unavailable   No LMP for male patient.  Allergies reviewed: Yes  Medications reviewed: Yes    Medications: Medication refills not needed today.  Pharmacy name entered into [employer]:    [facility] [place], [place] - [address]  [facility] [place], [place] - [address]    Clinical concerns: Does he need to start levothyroxine again and if he can get his acyclovir reduced        Elenita Gatica LPN            "

## 2021-11-30 LAB
PERFORMING LABORATORY: NORMAL
SARS-COV-2 RNA RESP QL NAA+PROBE: NEGATIVE
TEST NAME: NORMAL

## 2021-12-01 LAB — INTERPRETATION: NORMAL

## 2021-12-02 ENCOUNTER — ANCILLARY PROCEDURE (OUTPATIENT)
Dept: ULTRASOUND IMAGING | Facility: CLINIC | Age: 51
End: 2021-12-02
Attending: STUDENT IN AN ORGANIZED HEALTH CARE EDUCATION/TRAINING PROGRAM
Payer: COMMERCIAL

## 2021-12-02 DIAGNOSIS — C90.00 MULTIPLE MYELOMA NOT HAVING ACHIEVED REMISSION (H): ICD-10-CM

## 2021-12-02 PROCEDURE — 36589 REMOVAL TUNNELED CV CATH: CPT | Performed by: PHYSICIAN ASSISTANT

## 2021-12-02 RX ORDER — LIDOCAINE HYDROCHLORIDE 10 MG/ML
5 INJECTION, SOLUTION EPIDURAL; INFILTRATION; INTRACAUDAL; PERINEURAL ONCE
Status: COMPLETED | OUTPATIENT
Start: 2021-12-02 | End: 2021-12-02

## 2021-12-02 RX ADMIN — LIDOCAINE HYDROCHLORIDE 5 ML: 10 INJECTION, SOLUTION EPIDURAL; INFILTRATION; INTRACAUDAL; PERINEURAL at 15:07

## 2021-12-02 NOTE — PROGRESS NOTES
Interventional Radiology Brief Post Procedure Note    Procedure: IR Tunneled Line Removal-RIGHT     Proceduralist: Gail Esteves PA-C     Time Out: Prior to the start of the procedure and with procedural staff participation, I verbally confirmed the patient s identity using two indicators, relevant allergies, that the procedure was appropriate and matched the consent or emergent situation, and that the correct equipment/implants were available. Immediately prior to starting the procedure I conducted the Time Out with the procedural staff and re-confirmed the patient s name, procedure, and site/side. (The Joint Commission universal protocol was followed.)  Yes    Sedation: None. Local Anesthestic 1% lidocaine    Findings: Removal of RIGHT tunneled catheter in its entirety.      Estimated Blood Loss: Minimal    SPECIMENS: None    Complications: 1. None     Condition: Stable    Plan: Discharge    Comments: See dictated procedure note for full details.    Gail Esteves PA-C

## 2021-12-02 NOTE — DISCHARGE INSTRUCTIONS
A collaboration between [facility] and [facility]  Experts in minimally invasive, targeted treatments performed using imaging guidance    Tunneled Central Venous Catheter Removal    Today you had your existing tunneled central venous catheter removed because it was no longer needed for treatment.    Your catheter was removed by    After you go home:  - Avoid lying flat or bending at the waist for 2 hours following removal of the catheter to reduce risk of bleeding from catheter site.  - Keep any applied tape/gauze dressings clean and dry. Change tape/gauze dressings if they get wet or soiled.  - You may shower following the procedure, however cover and protect from moisture any tape/gauze dressings.   - You may remove tape/gauze dressings after 3 days if the site looks like it is in the process of healing or scabbing over.  - Avoid strenuous activities (elevating heart rate) or lifting more than 10 pounds for the next 3 days. Walking, elliptical and golf are examples of acceptable activities.  - If there is bleeding or oozing from the procedure site, apply firm pressure to the area above your collar bone (where the catheter entered the bloodstream) for 10 minutes.  If the bleeding continues, continue to hold pressure and seek medical attention at the phone numbers below.  - Mild procedure site discomfort can be treated with an ice pack and over-the-counter pain relievers.           Call our Interventional Radiology (IR) service if:  - If you start bleeding from the procedure site. Our radiology provider can help you decide if you need to return to the hospital.  - If you have new or worsening pain related to the procedure.  - If you have concerning swelling at the procedure site.  - If you develop hives or a rash or any unexplained itching.  - If you have a fever of greater than 100.5  F and chills in the first 5 days after procedure.  - Any other concerns  related to your procedure.    Contact Number:  [phone]  ([employer] control desk)  - Monday - Friday 8:00 am - 4:30 pm    After hours for urgent concerns:  [phone]  - After 4:30 pm Monday - Friday, Weekends and Holidays.   - Ask for Interventional Radiology on-call.  Someone is available 24 hours a day.  - [facility] toll free number:  [phone]

## 2021-12-02 NOTE — PROGRESS NOTES
"12/2/2020    Chaparro Jackson MD  Merged with [facility] [address]  [facility] [id number]    RE: Syd Proctor       Dear Colleague,    I had the pleasure of seeing ySd Proctor in the [facility].    Syd Proctor is a 16 year old male who is being evaluated via a billable video visit.      The patient has been notified of following:     \"This video visit will be conducted via a call between you and your physician/provider. We have found that certain health care needs can be provided without the need for an in-person physical exam.  This service lets us provide the care you need with a video conversation.  If a prescription is necessary we can send it directly to your pharmacy.  If lab work is needed we can place an order for that and you can then stop by our lab to have the test done at a later time.    Video visits are billed at different rates depending on your insurance coverage.  Please reach out to your insurance provider with any questions.    If during the course of the call the physician/provider feels a video visit is not appropriate, you will not be charged for this service.\"    Patient has given verbal consent for Video visit? Yes  How would you like to obtain your AVS? MyChart  If you are dropped from the video visit, the video invite should be resent to: Will anyone else be joining your video visit?       Review Of Systems  Skin: NEGATIVE  Eyes:Ears/Nose/Throat: NEGATIVE  Respiratory: pectus excavatum - SOB  Cardiovascular:NEGATIVE  Gastrointestinal: occ. heartburn  Genitourinary:NEGATIVE  Musculoskeletal: NEGATIVE  Neurologic: NEGATIVE  Psychiatric: slight and untreated anxiety  Hematologic/Lymphatic/Immunologic: lactose allergy  Endocrine:  NEGATIVE    ZEINAB Lamar    Telephone number of patient: [phone]    Video-Visit Details    Type of service:  Video Visit  DOX    Video call duration 15 minutes    Originating Location (pt. 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 Re-admitted with neutropenic fevers, abdominal pain, nausea and diarrhea, now discharged.      HPI: Mr Ronquillo and his wife present today for follow up as planned. Feels much better. No residual diarrhea. No n/v. Eating well. Energy is still low but slowly improving. Only ongoing issue is neuropathy in his toes bilaterally. Didn't like gabapentin and is currently managing with vitamin B complex and topical OTC creams. No fevers, chills or infectious symptoms. Got line out today.      ROS: Negative except as stated above in HPI     Physical Exam  /84 (BP Location: Right arm, Patient Position: Sitting, Cuff Size: Adult Regular)   Pulse 105   Temp 98.9  F (37.2  C) (Tympanic)   Resp 16   Wt 70.2 kg (154 lb 12.8 oz)   SpO2 100%   BMI 25.76 kg/m      Wt Readings from Last 5 Encounters:   12/03/21 70.2 kg (154 lb 12.8 oz)   11/29/21 71.2 kg (157 lb)   11/29/21 71.5 kg (157 lb 11.2 oz)   11/26/21 72.6 kg (160 lb)   11/24/21 73.5 kg (162 lb)        General: NAD,   Eyes: sclera anicteric   Lungs: CTA bilaterally  Cardiovascular: RRR, no M/R/G   Abdominal/Rectal: +BS, soft, no tenderness to palpation  Lymphatics: No edema  Skin: No rashes  Neuro: A&O        Labs:  Lab Results   Component Value Date    WBC 9.7 12/03/2021    ANEU 7.4 11/29/2021    HGB 10.8 (L) 12/03/2021    HCT 32.6 (L) 12/03/2021     (H) 12/03/2021     12/03/2021    POTASSIUM 4.3 12/03/2021    CHLORIDE 104 12/03/2021    CO2 24 12/03/2021    GLC 97 12/03/2021    BUN 15 12/03/2021    CR 0.85 12/03/2021    MAG 1.7 11/24/2021    INR 1.16 (H) 11/23/2021    BILITOTAL 0.2 12/03/2021    AST 22 12/03/2021    ALT 25 12/03/2021    ALKPHOS 69 12/03/2021    PROTTOTAL 6.9 12/03/2021    ALBUMIN 3.5 12/03/2021       I have assessed all abnormal lab values for their clinical significance and any values considered clinically significant have been  Location): Home    Distant Location (provider location):  [facility] [place]     Platform used for Video Visit: PitchEngine     Mr Proctor is a pleasant 16-year-old gentleman who is seen in cardiology clinic today for pectus excavatum.  To be noted this has been scheduled as a virtual visit.  Patient actually came for an in person visit to see a different cardiology provider a few weeks ago but patient wanted to have his sister accompany him to the clinic as patient gets quite anxious however due to Covid pandemic and new Covid policies at this time no visitors are allowed and eventually it was recommended that patient convert to a virtual visit.  Today this visit is a virtual visit/video visit and I am meeting patient for the first time.  It looks like patient was seen by pediatric surgeon Dr. Noble about a month ago for pectus excavatum surgical repair.  He has been asked to see a cardiologist.  Patient does not have any known cardiac issues.  Patient had pectus excavatum for a long time and according to him for the last 3 to 4 years is getting worse.  He recently had a chest x-ray that showed significant pectus excavatum with Padmini index of 4.8.  Patient does have another cousin who has pectus excavatum.  He has not been diagnosed with any other problems like Marfan syndrome although is quite tall 6 feet 4 inches.  Patient tells me that the he does notice shortness of breath with physical activity although he plays sports and runs but he feels his stamina is less than other friends of similar age.  No chest discomfort dizziness presyncope or syncope.  He does not notice any particular joint deformity or very flexible joints or eye problems.  No other family history of known cardiac issues or aneurysms.    Examination  Limited examination as this was a virtual visit he appears pleasant, comfortable, normal respiratory rate no audible wheezing    Assessment and plan  A pleasant 16-year-old  addressed in the assessment and plan.          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 BMT  - BMT doc/Coordinator: Miguelangel/Du Newsome; Lorelei/Christine  - Cell dose 6.99 x 10^6; gave all cells due to high risk disease  - GCSF completed 11/23.      2. HEME/COAG  - Transfusion parameters: hgb <7g/dL and plts <10,000..   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   - Thrombocytosis noted 12/3- likely reactive d/t quick plt recovery. Line is out to minimize clot risk. Follow for now.      3. ID.   Neutropenic fever/colitis- s/p cefepime. S/p cipro/flagyl- completed 11/26.   Prophy:  Fluc, ACV. Bactrim for PCP prophy to start at day +28     4. GI/NUTRITION  - Abdominal discomfort: CT A/P suggestive infectious/inflammatory enteritis/colitis. Compoleted oral antibiotics cipro/flagyl as above  - Ulcer prophy: Protonix 40mg daily.      5. CV  - Hx of CAD, MI at age 28 with stent placed  - HTN: 11/26 resumed losartan at 25mg/day 11/29. Increase to 50mg daily today. (previous dose). Cont to hold hydrochlorothiazide   - Hyperlipidemia: hold statin through transplant     6. RENAL/FEN  - Cr stable  - hypokalemia: on PO k+ 20mEq daily. Can likely stop next week.     7. ENDOCRINE  - hx hypothyroidism, cont synthroid  - new diagnosis of papillary thyroid carcinoma per bx result from 11/2, appears that the plan is for thyroidectomy after BMT     8. Neuro  - peripheral neuropathy: gabapentin 300mg BID.  His feet felt worse on higher dose, he reports.   - start B complex vitamin (contains vit C) about 2 weeks post transplant to avoid interaction with chemo.  - sent script to check coverage for gabapentin cream. If not covered could consider cymbalta or lyrica.     RTC: next week for anniversary visit. Sooner if needed.     25 minutes spent on the date of the encounter doing chart review, history and exam, documentation and further activities per the note      Shukri Moffett PA-C  [phone]     gentleman with pectus excavatum.  I did discuss with patient and his sister who also joined us on video visit about possible cardiac issues from pectus skeleton which could be a mechanical pressure on the heart.  I do recommend an echocardiogram specially as I was not able to examine the patient and auscultate him.  I also recommend an EKG and hopefully both of these tests could be done the same day.  Further recommendation will depend upon the results of these test.  I also told patient that pectus excavated sometime can be part of a  syndrome like Marfan's and I recommended patient see Dr. Noble back again for further evaluation of possibility of any such syndrome like Marfan's given that he is tall and there is some family history of pectus excavatum.  My office going to update him with those with echo and EKG I am also setting up a follow-up in about a month's time        Thank you for allowing me to participate in the care of your patient.    Sincerely,     Ramón Nieves MD     [facility]

## 2021-12-03 ENCOUNTER — APPOINTMENT (OUTPATIENT)
Dept: LAB | Facility: CLINIC | Age: 51
End: 2021-12-03
Attending: PHYSICIAN ASSISTANT
Payer: COMMERCIAL

## 2021-12-03 ENCOUNTER — ONCOLOGY VISIT (OUTPATIENT)
Dept: TRANSPLANT | Facility: CLINIC | Age: 51
End: 2021-12-03
Attending: PHYSICIAN ASSISTANT
Payer: COMMERCIAL

## 2021-12-03 VITALS
BODY MASS INDEX: 25.76 KG/M2 | DIASTOLIC BLOOD PRESSURE: 84 MMHG | WEIGHT: 154.8 LBS | SYSTOLIC BLOOD PRESSURE: 128 MMHG | HEART RATE: 105 BPM | RESPIRATION RATE: 16 BRPM | TEMPERATURE: 98.9 F | OXYGEN SATURATION: 100 %

## 2021-12-03 DIAGNOSIS — Z94.81 STATUS POST BONE MARROW TRANSPLANT (H): ICD-10-CM

## 2021-12-03 DIAGNOSIS — G62.9 PERIPHERAL POLYNEUROPATHY: Primary | ICD-10-CM

## 2021-12-03 DIAGNOSIS — C90.00 MULTIPLE MYELOMA NOT HAVING ACHIEVED REMISSION (H): ICD-10-CM

## 2021-12-03 LAB
ALBUMIN SERPL-MCNC: 3.5 G/DL (ref 3.4–5)
ALP SERPL-CCNC: 69 U/L (ref 40–150)
ALT SERPL W P-5'-P-CCNC: 25 U/L (ref 0–70)
ANION GAP SERPL CALCULATED.3IONS-SCNC: 8 MMOL/L (ref 3–14)
AST SERPL W P-5'-P-CCNC: 22 U/L (ref 0–45)
BASOPHILS # BLD AUTO: 0.1 10E3/UL (ref 0–0.2)
BASOPHILS NFR BLD AUTO: 1 %
BILIRUB SERPL-MCNC: 0.2 MG/DL (ref 0.2–1.3)
BUN SERPL-MCNC: 15 MG/DL (ref 7–30)
CALCIUM SERPL-MCNC: 9 MG/DL (ref 8.5–10.1)
CHLORIDE BLD-SCNC: 104 MMOL/L (ref 94–109)
CO2 SERPL-SCNC: 24 MMOL/L (ref 20–32)
CREAT SERPL-MCNC: 0.85 MG/DL (ref 0.66–1.25)
EOSINOPHIL # BLD AUTO: 0.1 10E3/UL (ref 0–0.7)
EOSINOPHIL NFR BLD AUTO: 1 %
ERYTHROCYTE [DISTWIDTH] IN BLOOD BY AUTOMATED COUNT: 16.2 % (ref 10–15)
GFR SERPL CREATININE-BSD FRML MDRD: >90 ML/MIN/1.73M2
GLUCOSE BLD-MCNC: 97 MG/DL (ref 70–99)
HCT VFR BLD AUTO: 32.6 % (ref 40–53)
HGB BLD-MCNC: 10.8 G/DL (ref 13.3–17.7)
IMM GRANULOCYTES # BLD: 0 10E3/UL
IMM GRANULOCYTES NFR BLD: 0 %
LYMPHOCYTES # BLD AUTO: 4.6 10E3/UL (ref 0.8–5.3)
LYMPHOCYTES NFR BLD AUTO: 48 %
MCH RBC QN AUTO: 30.3 PG (ref 26.5–33)
MCHC RBC AUTO-ENTMCNC: 33.1 G/DL (ref 31.5–36.5)
MCV RBC AUTO: 91 FL (ref 78–100)
MONOCYTES # BLD AUTO: 1.7 10E3/UL (ref 0–1.3)
MONOCYTES NFR BLD AUTO: 18 %
NEUTROPHILS # BLD AUTO: 3.1 10E3/UL (ref 1.6–8.3)
NEUTROPHILS NFR BLD AUTO: 32 %
NRBC # BLD AUTO: 0 10E3/UL
NRBC BLD AUTO-RTO: 0 /100
PLATELET # BLD AUTO: 606 10E3/UL (ref 150–450)
POTASSIUM BLD-SCNC: 4.3 MMOL/L (ref 3.4–5.3)
PROT SERPL-MCNC: 6.9 G/DL (ref 6.8–8.8)
RBC # BLD AUTO: 3.57 10E6/UL (ref 4.4–5.9)
SODIUM SERPL-SCNC: 136 MMOL/L (ref 133–144)
WBC # BLD AUTO: 9.7 10E3/UL (ref 4–11)

## 2021-12-03 PROCEDURE — 99213 OFFICE O/P EST LOW 20 MIN: CPT | Mod: 25

## 2021-12-03 PROCEDURE — 36415 COLL VENOUS BLD VENIPUNCTURE: CPT

## 2021-12-03 PROCEDURE — G0463 HOSPITAL OUTPT CLINIC VISIT: HCPCS

## 2021-12-03 PROCEDURE — 85025 COMPLETE CBC W/AUTO DIFF WBC: CPT

## 2021-12-03 PROCEDURE — 82040 ASSAY OF SERUM ALBUMIN: CPT

## 2021-12-03 RX ORDER — HEPARIN SODIUM,PORCINE 10 UNIT/ML
5 VIAL (ML) INTRAVENOUS
Status: DISCONTINUED | OUTPATIENT
Start: 2021-12-03 | End: 2021-12-03 | Stop reason: HOSPADM

## 2021-12-03 RX ORDER — LOSARTAN POTASSIUM 50 MG/1
50 TABLET ORAL DAILY
Qty: 30 TABLET | Refills: 1 | Status: SHIPPED | OUTPATIENT
Start: 2021-12-03 | End: 2022-03-22

## 2021-12-03 ASSESSMENT — PAIN SCALES - GENERAL: PAINLEVEL: MILD PAIN (3)

## 2021-12-03 NOTE — NURSING NOTE
Chief Complaint   Patient presents with     Blood Draw     labs drawn with  by rn. vs taken     Labs drawn with vpt by rn.  Pt tolerated well.  VS taken.  Pt checked in for next appt.    Aubrie Fleming RN

## 2021-12-03 NOTE — LETTER
12/3/2021         RE: June Ronquillo  [address]  [facility] [address]        Dear Colleague,    Thank you for referring your patient, June Ronquillo, to the [facility] [place]. Please see a copy of my visit note below.    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 Re-admitted with neutropenic fevers, abdominal pain, nausea and diarrhea, now discharged.      HPI: Mr Ronquillo and his wife present today for follow up as planned. Feels much better. No residual diarrhea. No n/v. Eating well. Energy is still low but slowly improving. Only ongoing issue is neuropathy in his toes bilaterally. Didn't like gabapentin and is currently managing with vitamin B complex and topical OTC creams. No fevers, chills or infectious symptoms. Got line out today.      ROS: Negative except as stated above in HPI     Physical Exam  /84 (BP Location: Right arm, Patient Position: Sitting, Cuff Size: Adult Regular)   Pulse 105   Temp 98.9  F (37.2  C) (Tympanic)   Resp 16   Wt 70.2 kg (154 lb 12.8 oz)   SpO2 100%   BMI 25.76 kg/m      Wt Readings from Last 5 Encounters:   12/03/21 70.2 kg (154 lb 12.8 oz)   11/29/21 71.2 kg (157 lb)   11/29/21 71.5 kg (157 lb 11.2 oz)   11/26/21 72.6 kg (160 lb)   11/24/21 73.5 kg (162 lb)        General: NAD,   Eyes: sclera anicteric   Lungs: CTA bilaterally  Cardiovascular: RRR, no M/R/G   Abdominal/Rectal: +BS, soft, no tenderness to palpation  Lymphatics: No edema  Skin: No rashes  Neuro: A&O        Labs:  Lab Results   Component Value Date    WBC 9.7 12/03/2021    ANEU 7.4 11/29/2021    HGB 10.8 (L) 12/03/2021    HCT 32.6 (L) 12/03/2021     (H) 12/03/2021     12/03/2021    POTASSIUM 4.3 12/03/2021    CHLORIDE 104 12/03/2021    CO2 24 12/03/2021    GLC 97 12/03/2021    BUN 15 12/03/2021    CR 0.85 12/03/2021    MAG 1.7 11/24/2021    INR 1.16 (H) 11/23/2021     BILITOTAL 0.2 12/03/2021    AST 22 12/03/2021    ALT 25 12/03/2021    ALKPHOS 69 12/03/2021    PROTTOTAL 6.9 12/03/2021    ALBUMIN 3.5 12/03/2021       I have assessed all abnormal lab values for their clinical significance and any values considered clinically significant have been addressed in the assessment and plan.          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 BMT  - BMT doc/Coordinator: Miguelangel/Du Newsome; Lorelei/Christine  - Cell dose 6.99 x 10^6; gave all cells due to high risk disease  - GCSF completed 11/23.      2. HEME/COAG  - Transfusion parameters: hgb <7g/dL and plts <10,000..   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   - Thrombocytosis noted 12/3- likely reactive d/t quick plt recovery. Line is out to minimize clot risk. Follow for now.      3. ID.   Neutropenic fever/colitis- s/p cefepime. S/p cipro/flagyl- completed 11/26.   Prophy:  Fluc, ACV. Bactrim for PCP prophy to start at day +28     4. GI/NUTRITION  - Abdominal discomfort: CT A/P suggestive infectious/inflammatory enteritis/colitis. Compoleted oral antibiotics cipro/flagyl as above  - Ulcer prophy: Protonix 40mg daily.      5. CV  - Hx of CAD, MI at age 28 with stent placed  - HTN: 11/26 resumed losartan at 25mg/day 11/29. Increase to 50mg daily today. (previous dose). Cont to hold hydrochlorothiazide   - Hyperlipidemia: hold statin through transplant     6. RENAL/FEN  - Cr stable  - hypokalemia: on PO k+ 20mEq daily. Can likely stop next week.     7. ENDOCRINE  - hx hypothyroidism, cont synthroid  - new diagnosis of papillary thyroid carcinoma per bx result from 11/2, appears that the plan is for thyroidectomy after BMT     8. Neuro  - peripheral neuropathy: gabapentin 300mg BID.  His feet felt worse on higher dose, he reports.   - start B complex vitamin (contains vit C) about 2 weeks post transplant to avoid interaction with chemo.  - sent script to check coverage for gabapentin cream. If not covered could  consider cymbalta or lyrica.     RTC: next week for anniversary visit. Sooner if needed.     25 minutes spent on the date of the encounter doing chart review, history and exam, documentation and further activities per the note      Shukri Moffett PA-C  [phone]

## 2021-12-03 NOTE — NURSING NOTE
"Oncology Rooming Note    December 3, 2021 2:36 PM   June Ronquillo is a 51 year old male who presents for:    Chief Complaint   Patient presents with     Blood Draw     labs drawn with  by rn. vs taken     RECHECK     multiple myeloma      Initial Vitals: /84 (BP Location: Right arm, Patient Position: Sitting, Cuff Size: Adult Regular)   Pulse 105   Temp 98.9  F (37.2  C) (Tympanic)   Resp 16   Wt 70.2 kg (154 lb 12.8 oz)   SpO2 100%   BMI 25.76 kg/m   Estimated body mass index is 25.76 kg/m  as calculated from the following:    Height as of 11/20/21: 1.651 m (5' 5\").    Weight as of this encounter: 70.2 kg (154 lb 12.8 oz). Body surface area is 1.79 meters squared.  Mild Pain (3) Comment: Data Unavailable   No LMP for male patient.  Allergies reviewed: Yes  Medications reviewed: Yes    Medications: Medication refills not needed today.  Pharmacy name entered into Seahorse:    [facility] [place], [place] - [address]  [facility] [place], [place] - [address]    Clinical concerns: none       Gianna Sheth CMA              "

## 2021-12-04 LAB — CMV DNA SPEC NAA+PROBE-ACNC: NOT DETECTED IU/ML

## 2021-12-07 ENCOUNTER — LAB (OUTPATIENT)
Dept: LAB | Facility: CLINIC | Age: 51
End: 2021-12-07
Attending: INTERNAL MEDICINE
Payer: COMMERCIAL

## 2021-12-07 DIAGNOSIS — C90.00 MULTIPLE MYELOMA NOT HAVING ACHIEVED REMISSION (H): Primary | ICD-10-CM

## 2021-12-07 LAB
ALBUMIN SERPL-MCNC: 3.8 G/DL (ref 3.4–5)
ALP SERPL-CCNC: 68 U/L (ref 40–150)
ALT SERPL W P-5'-P-CCNC: 23 U/L (ref 0–70)
ANION GAP SERPL CALCULATED.3IONS-SCNC: 11 MMOL/L (ref 3–14)
AST SERPL W P-5'-P-CCNC: 20 U/L (ref 0–45)
BASOPHILS # BLD AUTO: 0.1 10E3/UL (ref 0–0.2)
BASOPHILS NFR BLD AUTO: 2 %
BILIRUB SERPL-MCNC: 0.2 MG/DL (ref 0.2–1.3)
BUN SERPL-MCNC: 15 MG/DL (ref 7–30)
CALCIUM SERPL-MCNC: 9.1 MG/DL (ref 8.5–10.1)
CHLORIDE BLD-SCNC: 104 MMOL/L (ref 94–109)
CO2 SERPL-SCNC: 22 MMOL/L (ref 20–32)
CREAT SERPL-MCNC: 0.9 MG/DL (ref 0.66–1.25)
EOSINOPHIL # BLD AUTO: 0.5 10E3/UL (ref 0–0.7)
EOSINOPHIL NFR BLD AUTO: 6 %
ERYTHROCYTE [DISTWIDTH] IN BLOOD BY AUTOMATED COUNT: 16.1 % (ref 10–15)
GFR SERPL CREATININE-BSD FRML MDRD: >90 ML/MIN/1.73M2
GLUCOSE BLD-MCNC: 131 MG/DL (ref 70–99)
HCT VFR BLD AUTO: 35.4 % (ref 40–53)
HGB BLD-MCNC: 11.9 G/DL (ref 13.3–17.7)
IMM GRANULOCYTES # BLD: 0 10E3/UL
IMM GRANULOCYTES NFR BLD: 0 %
LDH SERPL L TO P-CCNC: 78 U/L (ref 85–227)
LYMPHOCYTES # BLD AUTO: 2.9 10E3/UL (ref 0.8–5.3)
LYMPHOCYTES NFR BLD AUTO: 37 %
MAGNESIUM SERPL-MCNC: 2.1 MG/DL (ref 1.6–2.3)
MCH RBC QN AUTO: 30.3 PG (ref 26.5–33)
MCHC RBC AUTO-ENTMCNC: 33.6 G/DL (ref 31.5–36.5)
MCV RBC AUTO: 90 FL (ref 78–100)
MONOCYTES # BLD AUTO: 1.1 10E3/UL (ref 0–1.3)
MONOCYTES NFR BLD AUTO: 14 %
NEUTROPHILS # BLD AUTO: 3.2 10E3/UL (ref 1.6–8.3)
NEUTROPHILS NFR BLD AUTO: 41 %
NRBC # BLD AUTO: 0 10E3/UL
NRBC BLD AUTO-RTO: 0 /100
PHOSPHATE SERPL-MCNC: 3.1 MG/DL (ref 2.5–4.5)
PLATELET # BLD AUTO: 657 10E3/UL (ref 150–450)
POTASSIUM BLD-SCNC: 4.1 MMOL/L (ref 3.4–5.3)
PROT SERPL-MCNC: 7.1 G/DL (ref 6.8–8.8)
RBC # BLD AUTO: 3.93 10E6/UL (ref 4.4–5.9)
SODIUM SERPL-SCNC: 137 MMOL/L (ref 133–144)
TOTAL PROTEIN SERUM FOR ELP: 6.8 G/DL (ref 6.8–8.8)
URATE SERPL-MCNC: 4.7 MG/DL (ref 3.5–7.2)
WBC # BLD AUTO: 7.7 10E3/UL (ref 4–11)

## 2021-12-07 PROCEDURE — 84550 ASSAY OF BLOOD/URIC ACID: CPT | Performed by: INTERNAL MEDICINE

## 2021-12-07 PROCEDURE — 84165 PROTEIN E-PHORESIS SERUM: CPT | Mod: TC | Performed by: PATHOLOGY

## 2021-12-07 PROCEDURE — 80053 COMPREHEN METABOLIC PANEL: CPT | Performed by: INTERNAL MEDICINE

## 2021-12-07 PROCEDURE — 86334 IMMUNOFIX E-PHORESIS SERUM: CPT | Mod: TC | Performed by: PHYSICIAN ASSISTANT

## 2021-12-07 PROCEDURE — 84100 ASSAY OF PHOSPHORUS: CPT | Performed by: INTERNAL MEDICINE

## 2021-12-07 PROCEDURE — 83883 ASSAY NEPHELOMETRY NOT SPEC: CPT | Performed by: PHYSICIAN ASSISTANT

## 2021-12-07 PROCEDURE — 84155 ASSAY OF PROTEIN SERUM: CPT | Mod: 91 | Performed by: PHYSICIAN ASSISTANT

## 2021-12-07 PROCEDURE — 83615 LACTATE (LD) (LDH) ENZYME: CPT | Performed by: INTERNAL MEDICINE

## 2021-12-07 PROCEDURE — 82784 ASSAY IGA/IGD/IGG/IGM EACH: CPT | Performed by: PHYSICIAN ASSISTANT

## 2021-12-07 PROCEDURE — 85025 COMPLETE CBC W/AUTO DIFF WBC: CPT | Performed by: INTERNAL MEDICINE

## 2021-12-07 PROCEDURE — 84165 PROTEIN E-PHORESIS SERUM: CPT | Mod: 26 | Performed by: PATHOLOGY

## 2021-12-07 PROCEDURE — 86334 IMMUNOFIX E-PHORESIS SERUM: CPT | Mod: 26 | Performed by: PATHOLOGY

## 2021-12-07 PROCEDURE — 83735 ASSAY OF MAGNESIUM: CPT | Performed by: INTERNAL MEDICINE

## 2021-12-07 NOTE — NURSING NOTE
Chief Complaint   Patient presents with     Labs Only     Labs drawn by  in lab by RN. VS taken.      Braulio Lindsay RN

## 2021-12-08 LAB
ALBUMIN SERPL ELPH-MCNC: 4.4 G/DL (ref 3.7–5.1)
ALPHA1 GLOB SERPL ELPH-MCNC: 0.3 G/DL (ref 0.2–0.4)
ALPHA2 GLOB SERPL ELPH-MCNC: 0.9 G/DL (ref 0.5–0.9)
B-GLOBULIN SERPL ELPH-MCNC: 0.8 G/DL (ref 0.6–1)
GAMMA GLOB SERPL ELPH-MCNC: 0.4 G/DL (ref 0.7–1.6)
IGA SERPL-MCNC: 46 MG/DL (ref 84–499)
IGG SERPL-MCNC: 454 MG/DL (ref 610–1616)
IGM SERPL-MCNC: 12 MG/DL (ref 35–242)
KAPPA LC FREE SER-MCNC: 0.31 MG/DL (ref 0.33–1.94)
KAPPA LC FREE/LAMBDA FREE SER NEPH: 1.15 {RATIO} (ref 0.26–1.65)
LAMBDA LC FREE SERPL-MCNC: 0.27 MG/DL (ref 0.57–2.63)
M PROTEIN SERPL ELPH-MCNC: 0 G/DL
PROT PATTERN SERPL ELPH-IMP: ABNORMAL
PROT PATTERN SERPL IFE-IMP: NORMAL

## 2021-12-08 NOTE — PROGRESS NOTES
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 Re-admitted with neutropenic fevers, abdominal pain, nausea and diarrhea, now discharged.      HPI: Mr Ronquillo comes by himself today.  He is doing progressively better.  He has some questions about his medications.  In particular no cough, no shortness of breath.  Diarrhea very rarely.  Occasionally needing antinausea medication.  Taste of food is better.  Eating and drinking well.  Line was removed and the site feels fine.    ROS: Negative except as stated above in HPI     Physical Exam    Systolic blood pressure was 116    Wt Readings from Last 5 Encounters:   12/03/21 70.2 kg (154 lb 12.8 oz)   11/29/21 71.2 kg (157 lb)   11/29/21 71.5 kg (157 lb 11.2 oz)   11/26/21 72.6 kg (160 lb)   11/24/21 73.5 kg (162 lb)   General: NAD,   Eyes: sclera anicteric   Lungs: CTA bilaterally  Cardiovascular: RRR, no M/R/G   Abdominal/Rectal: +BS, soft, no tenderness to palpation  Lymphatics: No edema  Skin: No rashes  Neuro: A&O   Catheter site well-healed     Labs:  Lab Results   Component Value Date    WBC 7.7 12/07/2021    ANEU 7.4 11/29/2021    HGB 11.9 (L) 12/07/2021    HCT 35.4 (L) 12/07/2021     (H) 12/07/2021     12/07/2021    POTASSIUM 4.1 12/07/2021    CHLORIDE 104 12/07/2021    CO2 22 12/07/2021     (H) 12/07/2021    BUN 15 12/07/2021    CR 0.90 12/07/2021    MAG 2.1 12/07/2021    INR 1.16 (H) 11/23/2021    BILITOTAL 0.2 12/07/2021    AST 20 12/07/2021    ALT 23 12/07/2021    ALKPHOS 68 12/07/2021    PROTTOTAL 7.1 12/07/2021    ALBUMIN 3.8 12/07/2021       Results for LÁZARO RONQUILLO (MRN [id number]) as of 12/9/2021 11:24   Ref. Range 12/7/2021 10:25   Albumin Fraction Latest Ref Range: 3.7 - 5.1 g/dL 4.4   Alpha 1 Fraction Latest Ref Range: 0.2 - 0.4 g/dL 0.3   Alpha 2 Fraction Latest Ref Range: 0.5 - 0.9 g/dL 0.9   Beta Fraction Latest Ref Range: 0.6 - 1.0 g/dL 0.8   ELP Interpretation: Unknown  Significant hypog...   Gamma Fraction Latest Ref Range: 0.7 - 1.6 g/dL 0.4 (L)   IGA Latest Ref Range: 84 - 499 mg/dL 46 (L)   IGG Latest Ref Range: 610-1,616 mg/dL 454 (L)   IGM Latest Ref Range: 35 - 242 mg/dL 12 (L)   Immunofixation ELP Unknown No monoclonal protein seen on immunofixation. Pathological significance requires clinical correla...   Monoclonal Peak Latest Ref Range: <=0.0 g/dL 0.0   Kappa Free Light Chains Latest Ref Range: 0.33 - 1.94 mg/dL 0.31 (L)   LAMBDA FREE LT CHAINS Latest Ref Range: 0.57 - 2.63 mg/dL 0.27 (L)   KAPPA/LAMBDA RATIO Latest Ref Range: 0.26 - 1.65  1.15   Total Protein Serum for ELP Latest Ref Range: 6.8 - 8.8 g/dL 6.8     I have assessed all abnormal lab values for their clinical significance and any values considered clinically significant have been addressed in the assessment and plan.      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 BMT  - BMT doc/Coordinator: Miguelangel/Du Newsome; Lorelei/Christine  - Cell dose 6.99 x 10^6; gave all cells due to high risk disease  - GCSF completed 11/23.      2. HEME/COAG: Hematologically recovered and transfusion independent.  He has mild thrombocytosis that requires no intervention at this time.  We will reassess with his next visit.     3. ID.  Advised the patient to start Bactrim next Monday, 12/13/2021 and continue until early November 2022.  Advised him to continue the fluconazole until day +60, and advised him to continue the acyclovir until early November 2022.  He will be eligible for a flu shot day 60 when he returns for the next visit.  Advised to monitor fevers cough or shortness of breath and call if needed.     4. GI/NUTRITION: Progressively improved oral intake and GI symptoms.  Rarely taking symptomatic medication.  May continue Protonix until day 60.     5. CV: Today we restarted his aspirin and he is out of a statin.  The patient will also restart his over-the-counter fish oil.  He is already  back on losartan.  As his blood pressure is borderline low he is going to stay off the hydrochlorothiazide.  May return to his cardiologist for follow-up.  - Hx of CAD, MI at age 28 with stent placed  - HTN: 11/26 resumed losartan 50mg daily. Cont to hold hydrochlorothiazide      6. RENAL/FEN: Good kidney function electrolytes.  As he improves his oral intake he may discontinue the potassium.  He is already off the phosphorus replacement.  - Cr stable    7. ENDOCRINE: At the 58 I would refer him back for the thyroidectomy  - hx hypothyroidism, cont synthroid  - new diagnosis of papillary thyroid carcinoma per bx result from 11/2, appears that the plan is for thyroidectomy after BMT     8. Neuro: He stopped taking the gabapentin and stopped using the gabapentin cream.    RTC Dr Du Newsome day +60; Call come sooner if needed  Continue Acyclovir 800 mg twice daily until 11/11/22  Start bactrim DS 1 tab twice daily MON and TUE until 11/11/22  Continue fluconazole until Day +60 post BMT    30 minutes spent on the date of the encounter doing chart review, history and exam, documentation and further activities per the note       Brandon Rendon MD on 12/9/2021 at 12:30 PM

## 2021-12-09 ENCOUNTER — OFFICE VISIT (OUTPATIENT)
Dept: TRANSPLANT | Facility: CLINIC | Age: 51
End: 2021-12-09
Attending: INTERNAL MEDICINE
Payer: COMMERCIAL

## 2021-12-09 DIAGNOSIS — I25.10 ASCVD (ARTERIOSCLEROTIC CARDIOVASCULAR DISEASE): ICD-10-CM

## 2021-12-09 DIAGNOSIS — C90.00 MULTIPLE MYELOMA NOT HAVING ACHIEVED REMISSION (H): Primary | ICD-10-CM

## 2021-12-09 DIAGNOSIS — Z94.81 STATUS POST BONE MARROW TRANSPLANT (H): ICD-10-CM

## 2021-12-09 PROCEDURE — 99214 OFFICE O/P EST MOD 30 MIN: CPT | Mod: 25

## 2021-12-09 PROCEDURE — G0463 HOSPITAL OUTPT CLINIC VISIT: HCPCS

## 2021-12-09 RX ORDER — SULFAMETHOXAZOLE/TRIMETHOPRIM 800-160 MG
1 TABLET ORAL
Qty: 16 TABLET | Refills: 10 | Status: SHIPPED | OUTPATIENT
Start: 2021-12-13 | End: 2022-01-12

## 2021-12-09 RX ORDER — ATORVASTATIN CALCIUM 20 MG/1
20 TABLET, FILM COATED ORAL DAILY
COMMUNITY
Start: 2021-12-09 | End: 2022-04-04 | Stop reason: ALTCHOICE

## 2021-12-09 NOTE — LETTER
12/9/2021         RE: Lázaro Ronquillo  [address] [facility] [address]        Dear Colleague,    Thank you for referring your patient, Lázaro Ronquillo, to the [facility] BLOOD AND MARROW TRANSPLANT PROGRAM [place]. Please see a copy of my visit note below.    BMT Progress Note     ID: Lázaro Ronquillo is a 52 yo man s/p auto PBSCT for high risk MM. Re-admitted with neutropenic fevers, abdominal pain, nausea and diarrhea, now discharged.      HPI: Mr Ronquillo comes by himself today.  He is doing progressively better.  He has some questions about his medications.  In particular no cough, no shortness of breath.  Diarrhea very rarely.  Occasionally needing antinausea medication.  Taste of food is better.  Eating and drinking well.  Line was removed and the site feels fine.    ROS: Negative except as stated above in HPI     Physical Exam    Systolic blood pressure was 116    Wt Readings from Last 5 Encounters:   12/03/21 70.2 kg (154 lb 12.8 oz)   11/29/21 71.2 kg (157 lb)   11/29/21 71.5 kg (157 lb 11.2 oz)   11/26/21 72.6 kg (160 lb)   11/24/21 73.5 kg (162 lb)   General: NAD,   Eyes: sclera anicteric   Lungs: CTA bilaterally  Cardiovascular: RRR, no M/R/G   Abdominal/Rectal: +BS, soft, no tenderness to palpation  Lymphatics: No edema  Skin: No rashes  Neuro: A&O   Catheter site well-healed     Labs:  Lab Results   Component Value Date    WBC 7.7 12/07/2021    ANEU 7.4 11/29/2021    HGB 11.9 (L) 12/07/2021    HCT 35.4 (L) 12/07/2021     (H) 12/07/2021     12/07/2021    POTASSIUM 4.1 12/07/2021    CHLORIDE 104 12/07/2021    CO2 22 12/07/2021     (H) 12/07/2021    BUN 15 12/07/2021    CR 0.90 12/07/2021    MAG 2.1 12/07/2021    INR 1.16 (H) 11/23/2021    BILITOTAL 0.2 12/07/2021    AST 20 12/07/2021    ALT 23 12/07/2021    ALKPHOS 68 12/07/2021    PROTTOTAL 7.1 12/07/2021    ALBUMIN 3.8 12/07/2021       Results for LÁZARO RONQUILLO (MRN [id number]) as of  12/9/2021 11:24   Ref. Range 12/7/2021 10:25   Albumin Fraction Latest Ref Range: 3.7 - 5.1 g/dL 4.4   Alpha 1 Fraction Latest Ref Range: 0.2 - 0.4 g/dL 0.3   Alpha 2 Fraction Latest Ref Range: 0.5 - 0.9 g/dL 0.9   Beta Fraction Latest Ref Range: 0.6 - 1.0 g/dL 0.8   ELP Interpretation: Unknown Significant hypog...   Gamma Fraction Latest Ref Range: 0.7 - 1.6 g/dL 0.4 (L)   IGA Latest Ref Range: 84 - 499 mg/dL 46 (L)   IGG Latest Ref Range: 610-1,616 mg/dL 454 (L)   IGM Latest Ref Range: 35 - 242 mg/dL 12 (L)   Immunofixation ELP Unknown No monoclonal protein seen on immunofixation. Pathological significance requires clinical correla...   Monoclonal Peak Latest Ref Range: <=0.0 g/dL 0.0   Kappa Free Light Chains Latest Ref Range: 0.33 - 1.94 mg/dL 0.31 (L)   LAMBDA FREE LT CHAINS Latest Ref Range: 0.57 - 2.63 mg/dL 0.27 (L)   KAPPA/LAMBDA RATIO Latest Ref Range: 0.26 - 1.65  1.15   Total Protein Serum for ELP Latest Ref Range: 6.8 - 8.8 g/dL 6.8     I have assessed all abnormal lab values for their clinical significance and any values considered clinically significant have been addressed in the assessment and plan.      A/P: June Ronquillo is a 50 yo man D+28 s/p auto PBSCT for high risk MM      1. BMT  - BMT doc/Coordinator: Miguelangel/Du Newsome; Lorelei/Christine  - Cell dose 6.99 x 10^6; gave all cells due to high risk disease  - GCSF completed 11/23.      2. HEME/COAG: Hematologically recovered and transfusion independent.  He has mild thrombocytosis that requires no intervention at this time.  We will reassess with his next visit.     3. ID.  Advised the patient to start Bactrim next Monday, 12/13/2021 and continue until early November 2022.  Advised him to continue the fluconazole until day +60, and advised him to continue the acyclovir until early November 2022.  He will be eligible for a flu shot day 60 when he returns for the next visit.  Advised to monitor fevers cough or shortness of breath and call if  needed.     4. GI/NUTRITION: Progressively improved oral intake and GI symptoms.  Rarely taking symptomatic medication.  May continue Protonix until day 60.     5. CV: Today we restarted his aspirin and he is out of a statin.  The patient will also restart his over-the-counter fish oil.  He is already back on losartan.  As his blood pressure is borderline low he is going to stay off the hydrochlorothiazide.  May return to his cardiologist for follow-up.  - Hx of CAD, MI at age 28 with stent placed  - HTN: 11/26 resumed losartan 50mg daily. Cont to hold hydrochlorothiazide      6. RENAL/FEN: Good kidney function electrolytes.  As he improves his oral intake he may discontinue the potassium.  He is already off the phosphorus replacement.  - Cr stable    7. ENDOCRINE: At the 58 I would refer him back for the thyroidectomy  - hx hypothyroidism, cont synthroid  - new diagnosis of papillary thyroid carcinoma per bx result from 11/2, appears that the plan is for thyroidectomy after BMT     8. Neuro: He stopped taking the gabapentin and stopped using the gabapentin cream.    RTC Dr Du Newsome day +60; Call come sooner if needed  Continue Acyclovir 800 mg twice daily until 11/11/22  Start bactrim DS 1 tab twice daily MON and TUE until 11/11/22  Continue fluconazole until Day +60 post BMT    30 minutes spent on the date of the encounter doing chart review, history and exam, documentation and further activities per the note       Brandon Rendon MD on 12/9/2021 at 12:30 PM

## 2021-12-09 NOTE — PATIENT INSTRUCTIONS
RTC Dr Du Newsome day +60; Call come sooner if needed  Continue Acyclovir 800 mg twice daily until 11/11/22  Start bactrim DS 1 tab twice daily MON and TUE until 11/11/22  Continue fluconazole until Day +60 post BMT

## 2021-12-10 LAB — ANTIBODY ID: NORMAL

## 2021-12-15 ENCOUNTER — HOME INFUSION (PRE-WILLOW HOME INFUSION) (OUTPATIENT)
Dept: PHARMACY | Facility: CLINIC | Age: 51
End: 2021-12-15
Payer: COMMERCIAL

## 2021-12-17 ENCOUNTER — MYC REFILL (OUTPATIENT)
Dept: FAMILY MEDICINE | Facility: CLINIC | Age: 51
End: 2021-12-17
Payer: COMMERCIAL

## 2021-12-17 DIAGNOSIS — C90.00 MULTIPLE MYELOMA NOT HAVING ACHIEVED REMISSION (H): ICD-10-CM

## 2021-12-17 DIAGNOSIS — K30 STOMACH UPSET: ICD-10-CM

## 2021-12-17 RX ORDER — PANTOPRAZOLE SODIUM 40 MG/1
40 TABLET, DELAYED RELEASE ORAL DAILY
Qty: 30 TABLET | Refills: 0 | Status: SHIPPED | OUTPATIENT
Start: 2021-12-17 | End: 2022-01-17

## 2022-01-10 DIAGNOSIS — C90.00 MULTIPLE MYELOMA NOT HAVING ACHIEVED REMISSION (H): ICD-10-CM

## 2022-01-10 RX ORDER — FLUCONAZOLE 200 MG/1
200 TABLET ORAL DAILY
Qty: 30 TABLET | Refills: 0 | Status: SHIPPED | OUTPATIENT
Start: 2022-01-10 | End: 2022-01-19

## 2022-01-18 ENCOUNTER — VIRTUAL VISIT (OUTPATIENT)
Dept: TRANSPLANT | Facility: CLINIC | Age: 52
End: 2022-01-18
Attending: INTERNAL MEDICINE
Payer: COMMERCIAL

## 2022-01-18 DIAGNOSIS — C90.00 MULTIPLE MYELOMA NOT HAVING ACHIEVED REMISSION (H): ICD-10-CM

## 2022-01-18 DIAGNOSIS — C90.00 MULTIPLE MYELOMA NOT HAVING ACHIEVED REMISSION (H): Primary | ICD-10-CM

## 2022-01-18 PROCEDURE — 99213 OFFICE O/P EST LOW 20 MIN: CPT | Mod: 95 | Performed by: INTERNAL MEDICINE

## 2022-01-18 RX ORDER — FLUCONAZOLE 200 MG/1
200 TABLET ORAL DAILY
Qty: 30 TABLET | Refills: 0 | Status: CANCELLED | OUTPATIENT
Start: 2022-01-18

## 2022-01-18 NOTE — TELEPHONE ENCOUNTER
Reason for Call:  Medication or medication refill:    Do you use a [facility]?  Name of the pharmacy and phone number for the current request:  [facility] - [place], [place] - [address] [place] [address] (aka [facility]) (Pharmacy)    Name of the medication requested: fluconazole (DIFLUCAN) 200 MG tablet    Other request: NA    Can we leave a detailed message on this number? YES    Phone number patient can be reached at: Home number on file [phone] (home)    Best Time: ANY    Call taken on 1/18/2022 at 10:02 AM by Bella Mendez

## 2022-01-18 NOTE — PROGRESS NOTES
June is a 51 year old who is being evaluated via a billable video visit.      How would you like to obtain your AVS? Wellbehart  If the video visit is dropped, the invitation should be resent by: Text to cell phone: [phone]  Will anyone else be joining your video visit? No      Video Start Time: 4:30  Video-Visit Details    Type of service:  Video Visit    Video End Time: 4:45    Originating Location (pt. Location): Home       Distant Location (provider location):  [facility] BLOOD AND MARROW TRANSPLANT PROGRAM [place]     Platform used for Video Visit: Adán Stewart

## 2022-01-18 NOTE — TELEPHONE ENCOUNTER
"Requested Prescriptions   Pending Prescriptions Disp Refills     fluconazole (DIFLUCAN) 200 MG tablet 30 tablet 0     Sig: Take 1 tablet (200 mg) by mouth daily       Antifungal Agents Failed - 1/18/2022 10:03 AM        Failed - Not Fluconazole or Terconazole      If oral Fluconazole or Terconazole, may refill if indicated in progress notes.           Passed - Recent (12 mo) or future (30 days) visit within the authorizing provider's specialty     Patient has had an office visit with the authorizing provider or a provider within the authorizing providers department within the previous 12 mos or has a future within next 30 days. See \"Patient Info\" tab in inbasket, or \"Choose Columns\" in Meds & Orders section of the refill encounter.              Passed - Medication is active on med list           Evelyn Najera RN  [facility]     "

## 2022-01-18 NOTE — LETTER
1/18/2022         RE: June Ronquillo  [address]  [place] [facility] [address]        Dear Colleague,    Thank you for referring your patient, June Ronquillo, to the [facility] BLOOD AND MARROW TRANSPLANT PROGRAM [place]. Please see a copy of my visit note below.    Patient is at risk and therefore eligible per guidelines.    Talked to patient and evaluated by me. We discussed the risks and benefits of receiving EVUSHELD, as well as alternative treatments. The Emergency Use Administration (EUA) was provided to the patient for review and an opportunity for questions was provided. Patient wishes to proceed with EVUSHELD.    No other acute issues identified.    Cassie Newsome MD    June is a 51 year old who is being evaluated via a billable video visit.      How would you like to obtain your AVS? MyChart  If the video visit is dropped, the invitation should be resent by: Text to cell phone: [phone]  Will anyone else be joining your video visit? Ashley Stewart        Again, thank you for allowing me to participate in the care of your patient.      Sincerely,    Cassie Newsome MD

## 2022-01-18 NOTE — PROGRESS NOTES
Patient is at risk and therefore eligible per guidelines.    Talked to patient and evaluated by me. We discussed the risks and benefits of receiving EVUSHELD, as well as alternative treatments. The Emergency Use Administration (EUA) was provided to the patient for review and an opportunity for questions was provided. Patient wishes to proceed with EVUSHELD.    No other acute issues identified.    Cassie Newsome MD

## 2022-01-19 ENCOUNTER — APPOINTMENT (OUTPATIENT)
Dept: LAB | Facility: CLINIC | Age: 52
End: 2022-01-19
Attending: INTERNAL MEDICINE
Payer: COMMERCIAL

## 2022-01-19 ENCOUNTER — INFUSION THERAPY VISIT (OUTPATIENT)
Dept: TRANSPLANT | Facility: CLINIC | Age: 52
End: 2022-01-19
Attending: STUDENT IN AN ORGANIZED HEALTH CARE EDUCATION/TRAINING PROGRAM
Payer: COMMERCIAL

## 2022-01-19 ENCOUNTER — TELEPHONE (OUTPATIENT)
Dept: ONCOLOGY | Facility: CLINIC | Age: 52
End: 2022-01-19
Payer: COMMERCIAL

## 2022-01-19 VITALS
OXYGEN SATURATION: 100 % | WEIGHT: 161.8 LBS | RESPIRATION RATE: 16 BRPM | BODY MASS INDEX: 26.92 KG/M2 | HEART RATE: 87 BPM | TEMPERATURE: 97.9 F | SYSTOLIC BLOOD PRESSURE: 130 MMHG | DIASTOLIC BLOOD PRESSURE: 85 MMHG

## 2022-01-19 DIAGNOSIS — Z94.81 STATUS POST BONE MARROW TRANSPLANT (H): ICD-10-CM

## 2022-01-19 DIAGNOSIS — C90.00 MULTIPLE MYELOMA NOT HAVING ACHIEVED REMISSION (H): ICD-10-CM

## 2022-01-19 DIAGNOSIS — C90.00 MULTIPLE MYELOMA NOT HAVING ACHIEVED REMISSION (H): Primary | ICD-10-CM

## 2022-01-19 DIAGNOSIS — Z52.011 AUTOLOGOUS DONOR, STEM CELLS: ICD-10-CM

## 2022-01-19 LAB
ALBUMIN SERPL-MCNC: 4.1 G/DL (ref 3.4–5)
ALP SERPL-CCNC: 42 U/L (ref 40–150)
ALT SERPL W P-5'-P-CCNC: 25 U/L (ref 0–70)
ANION GAP SERPL CALCULATED.3IONS-SCNC: 10 MMOL/L (ref 3–14)
AST SERPL W P-5'-P-CCNC: 19 U/L (ref 0–45)
BASOPHILS # BLD AUTO: 0 10E3/UL (ref 0–0.2)
BASOPHILS NFR BLD AUTO: 0 %
BILIRUB SERPL-MCNC: 0.2 MG/DL (ref 0.2–1.3)
BUN SERPL-MCNC: 16 MG/DL (ref 7–30)
CALCIUM SERPL-MCNC: 8.7 MG/DL (ref 8.5–10.1)
CHLORIDE BLD-SCNC: 111 MMOL/L (ref 94–109)
CO2 SERPL-SCNC: 22 MMOL/L (ref 20–32)
CREAT SERPL-MCNC: 1.16 MG/DL (ref 0.66–1.25)
EOSINOPHIL # BLD AUTO: 0.3 10E3/UL (ref 0–0.7)
EOSINOPHIL NFR BLD AUTO: 5 %
ERYTHROCYTE [DISTWIDTH] IN BLOOD BY AUTOMATED COUNT: 16.1 % (ref 10–15)
GFR SERPL CREATININE-BSD FRML MDRD: 75 ML/MIN/1.73M2
GLUCOSE BLD-MCNC: 115 MG/DL (ref 70–99)
HCT VFR BLD AUTO: 33.3 % (ref 40–53)
HGB BLD-MCNC: 11.4 G/DL (ref 13.3–17.7)
IMM GRANULOCYTES # BLD: 0 10E3/UL
IMM GRANULOCYTES NFR BLD: 0 %
LYMPHOCYTES # BLD AUTO: 2.3 10E3/UL (ref 0.8–5.3)
LYMPHOCYTES NFR BLD AUTO: 33 %
MCH RBC QN AUTO: 31.2 PG (ref 26.5–33)
MCHC RBC AUTO-ENTMCNC: 34.2 G/DL (ref 31.5–36.5)
MCV RBC AUTO: 91 FL (ref 78–100)
MONOCYTES # BLD AUTO: 1 10E3/UL (ref 0–1.3)
MONOCYTES NFR BLD AUTO: 14 %
NEUTROPHILS # BLD AUTO: 3.3 10E3/UL (ref 1.6–8.3)
NEUTROPHILS NFR BLD AUTO: 48 %
NRBC # BLD AUTO: 0 10E3/UL
NRBC BLD AUTO-RTO: 0 /100
PLATELET # BLD AUTO: 360 10E3/UL (ref 150–450)
POTASSIUM BLD-SCNC: 3.6 MMOL/L (ref 3.4–5.3)
PROT SERPL-MCNC: 6.4 G/DL (ref 6.8–8.8)
RBC # BLD AUTO: 3.65 10E6/UL (ref 4.4–5.9)
SODIUM SERPL-SCNC: 143 MMOL/L (ref 133–144)
TOTAL PROTEIN SERUM FOR ELP: 6.4 G/DL (ref 6.8–8.8)
WBC # BLD AUTO: 6.8 10E3/UL (ref 4–11)

## 2022-01-19 PROCEDURE — 80053 COMPREHEN METABOLIC PANEL: CPT

## 2022-01-19 PROCEDURE — G0008 ADMIN INFLUENZA VIRUS VAC: HCPCS | Performed by: INTERNAL MEDICINE

## 2022-01-19 PROCEDURE — 250N000011 HC RX IP 250 OP 636: Performed by: INTERNAL MEDICINE

## 2022-01-19 PROCEDURE — 86334 IMMUNOFIX E-PHORESIS SERUM: CPT | Mod: 26 | Performed by: PATHOLOGY

## 2022-01-19 PROCEDURE — 82784 ASSAY IGA/IGD/IGG/IGM EACH: CPT

## 2022-01-19 PROCEDURE — 36415 COLL VENOUS BLD VENIPUNCTURE: CPT

## 2022-01-19 PROCEDURE — 85025 COMPLETE CBC W/AUTO DIFF WBC: CPT

## 2022-01-19 PROCEDURE — 90682 RIV4 VACC RECOMBINANT DNA IM: CPT | Performed by: INTERNAL MEDICINE

## 2022-01-19 PROCEDURE — 82785 ASSAY OF IGE: CPT

## 2022-01-19 PROCEDURE — 84165 PROTEIN E-PHORESIS SERUM: CPT | Mod: TC | Performed by: PATHOLOGY

## 2022-01-19 PROCEDURE — 84155 ASSAY OF PROTEIN SERUM: CPT | Mod: 91

## 2022-01-19 PROCEDURE — 84165 PROTEIN E-PHORESIS SERUM: CPT | Mod: 26 | Performed by: PATHOLOGY

## 2022-01-19 PROCEDURE — 96372 THER/PROPH/DIAG INJ SC/IM: CPT | Performed by: INTERNAL MEDICINE

## 2022-01-19 PROCEDURE — 86334 IMMUNOFIX E-PHORESIS SERUM: CPT

## 2022-01-19 PROCEDURE — 83521 IG LIGHT CHAINS FREE EACH: CPT

## 2022-01-19 RX ORDER — HEPARIN SODIUM,PORCINE 10 UNIT/ML
5 VIAL (ML) INTRAVENOUS
Status: CANCELLED | OUTPATIENT
Start: 2022-01-19

## 2022-01-19 RX ORDER — HEPARIN SODIUM,PORCINE 10 UNIT/ML
5 VIAL (ML) INTRAVENOUS
Status: CANCELLED | OUTPATIENT
Start: 2022-06-13

## 2022-01-19 RX ORDER — HEPARIN SODIUM (PORCINE) LOCK FLUSH IV SOLN 100 UNIT/ML 100 UNIT/ML
5 SOLUTION INTRAVENOUS
Status: CANCELLED | OUTPATIENT
Start: 2022-06-13

## 2022-01-19 RX ORDER — POTASSIUM CHLORIDE 750 MG/1
20 TABLET, EXTENDED RELEASE ORAL DAILY
Qty: 30 TABLET | Refills: 3 | Status: CANCELLED | OUTPATIENT
Start: 2022-01-19

## 2022-01-19 RX ORDER — HEPARIN SODIUM (PORCINE) LOCK FLUSH IV SOLN 100 UNIT/ML 100 UNIT/ML
5 SOLUTION INTRAVENOUS
Status: CANCELLED | OUTPATIENT
Start: 2022-05-16

## 2022-01-19 RX ORDER — SULFAMETHOXAZOLE AND TRIMETHOPRIM 400; 80 MG/1; MG/1
1 TABLET ORAL 2 TIMES DAILY
COMMUNITY
End: 2022-10-11

## 2022-01-19 RX ORDER — HEPARIN SODIUM (PORCINE) LOCK FLUSH IV SOLN 100 UNIT/ML 100 UNIT/ML
5 SOLUTION INTRAVENOUS
Status: CANCELLED | OUTPATIENT
Start: 2022-01-19

## 2022-01-19 RX ORDER — HEPARIN SODIUM,PORCINE 10 UNIT/ML
5 VIAL (ML) INTRAVENOUS
Status: CANCELLED | OUTPATIENT
Start: 2022-05-16

## 2022-01-19 RX ADMIN — Medication: at 16:02

## 2022-01-19 RX ADMIN — INFLUENZA A VIRUS A/WISCONSIN/588/2019 (H1N1) RECOMBINANT HEMAGGLUTININ ANTIGEN, INFLUENZA A VIRUS A/TASMANIA/503/2020 (H3N2) RECOMBINANT HEMAGGLUTININ ANTIGEN, INFLUENZA B VIRUS B/WASHINGTON/02/2019 RECOMBINANT HEMAGGLUTININ ANTIGEN, AND INFLUENZA B VIRUS B/PHUKET/3073/2013 RECOMBINANT HEMAGGLUTININ ANTIGEN 0.5 ML: 45; 45; 45; 45 INJECTION INTRAMUSCULAR at 16:12

## 2022-01-19 ASSESSMENT — PAIN SCALES - GENERAL: PAINLEVEL: NO PAIN (0)

## 2022-01-19 NOTE — PROGRESS NOTES
EVUSHELD Administration Note:  Kurtisdorianmatthias Ronquillo presents today for EVUSHELD.  Patient seen by provider today: No   present during visit today: Not Applicable.    Note: Consented by Dr. Cassie Newsome on 1/18 via virtual visit. Evusheld injections given intramuscularly, bilateral ventrogluteal.    Confirmed patient received the Emergency Use Authorization Fact Sheet for Patients, Parents and Caregivers prior to receiving medication. Confirmed patient had conversation with provider about medication and no further questions at this time.     Post Injection Assessment:  Patient tolerated injection without incident.   Patient was observed in the room for a minimum of 10 minutes after injection per standard, then remained in the buidling for a total 60 minute observation period.      Discharge Plan:   Patient discharged in stable condition accompanied by: daren.    Mary Reyes RN

## 2022-01-19 NOTE — NURSING NOTE
Chief Complaint   Patient presents with     Blood Draw     Labs drawn via  by RN. Vitals taken.     Labs collected from venipuncture by RN. Vitals taken. Checked in for appointment(s).      Ayanna Johnson RN

## 2022-01-19 NOTE — PROGRESS NOTES
Influenza vaccine given to patient in Left Deltoid . Patient tolerated injection without any incidents.     Has the patient received the information for the injectable influenza vaccine? YES    Mary Reyes RN  January 19, 2022 4:16 PM

## 2022-01-19 NOTE — LETTER
1/19/2022         RE: June Ronquillo  [address]  [facility] [address]        Dear Colleague,    Thank you for referring your patient, June Ronquillo, to the [facility] BLOOD AND MARROW TRANSPLANT PROGRAM [place]. Please see a copy of my visit note below.    Influenza vaccine given to patient in Left Deltoid . Patient tolerated injection without any incidents.     Has the patient received the information for the injectable influenza vaccine? YES    Mary Reyes RN  January 19, 2022 4:16 PM      EVUSHELD Administration Note:  June Ronquillo presents today for EVUSHELD.  Patient seen by provider today: No   present during visit today: Not Applicable.    Note: Consented by Dr. Cassie Newsome on 1/18 via virtual visit. Evusheld injections given intramuscularly, bilateral ventrogluteal.    Confirmed patient received the Emergency Use Authorization Fact Sheet for Patients, Parents and Caregivers prior to receiving medication. Confirmed patient had conversation with provider about medication and no further questions at this time.     Post Injection Assessment:  Patient tolerated injection without incident.   Patient was observed in the room for a minimum of 10 minutes after injection per standard, then remained in the buidling for a total 60 minute observation period.      Discharge Plan:   Patient discharged in stable condition accompanied by: self.    Mary Reyes RN          Again, thank you for allowing me to participate in the care of your patient.      Sincerely,    [facility]

## 2022-01-20 LAB
ALBUMIN SERPL ELPH-MCNC: 4.6 G/DL (ref 3.7–5.1)
ALPHA1 GLOB SERPL ELPH-MCNC: 0.3 G/DL (ref 0.2–0.4)
ALPHA2 GLOB SERPL ELPH-MCNC: 0.5 G/DL (ref 0.5–0.9)
B-GLOBULIN SERPL ELPH-MCNC: 0.7 G/DL (ref 0.6–1)
CMV DNA SPEC NAA+PROBE-ACNC: NOT DETECTED IU/ML
GAMMA GLOB SERPL ELPH-MCNC: 0.3 G/DL (ref 0.7–1.6)
IGA SERPL-MCNC: 7 MG/DL (ref 84–499)
IGG SERPL-MCNC: 204 MG/DL (ref 610–1616)
IGM SERPL-MCNC: <10 MG/DL (ref 35–242)
KAPPA LC FREE SER-MCNC: 0.14 MG/DL (ref 0.33–1.94)
KAPPA LC FREE/LAMBDA FREE SER NEPH: 0.47 {RATIO} (ref 0.26–1.65)
LAMBDA LC FREE SERPL-MCNC: 0.3 MG/DL (ref 0.57–2.63)
M PROTEIN SERPL ELPH-MCNC: 0 G/DL
PROT PATTERN SERPL ELPH-IMP: ABNORMAL
PROT PATTERN SERPL IFE-IMP: NORMAL

## 2022-01-21 DIAGNOSIS — C90.00 MULTIPLE MYELOMA NOT HAVING ACHIEVED REMISSION (H): ICD-10-CM

## 2022-01-21 LAB
IGD SER-MCNC: <1.3 MG/DL
IGE SERPL-ACNC: <2 KU/L (ref 0–114)

## 2022-01-21 RX ORDER — POTASSIUM CHLORIDE 750 MG/1
20 TABLET, EXTENDED RELEASE ORAL DAILY
Qty: 60 TABLET | Refills: 0 | Status: SHIPPED | OUTPATIENT
Start: 2022-01-21 | End: 2022-03-07

## 2022-01-25 ENCOUNTER — APPOINTMENT (OUTPATIENT)
Dept: LAB | Facility: CLINIC | Age: 52
End: 2022-01-25
Attending: INTERNAL MEDICINE
Payer: COMMERCIAL

## 2022-01-25 ENCOUNTER — ONCOLOGY VISIT (OUTPATIENT)
Dept: TRANSPLANT | Facility: CLINIC | Age: 52
End: 2022-01-25
Attending: INTERNAL MEDICINE
Payer: COMMERCIAL

## 2022-01-25 VITALS
DIASTOLIC BLOOD PRESSURE: 86 MMHG | SYSTOLIC BLOOD PRESSURE: 137 MMHG | HEART RATE: 84 BPM | WEIGHT: 162.7 LBS | BODY MASS INDEX: 27.11 KG/M2 | OXYGEN SATURATION: 100 % | RESPIRATION RATE: 16 BRPM | TEMPERATURE: 97.6 F | HEIGHT: 65 IN

## 2022-01-25 DIAGNOSIS — Z94.81 STATUS POST BONE MARROW TRANSPLANT (H): ICD-10-CM

## 2022-01-25 PROCEDURE — 85025 COMPLETE CBC W/AUTO DIFF WBC: CPT

## 2022-01-25 PROCEDURE — G0463 HOSPITAL OUTPT CLINIC VISIT: HCPCS

## 2022-01-25 PROCEDURE — 80053 COMPREHEN METABOLIC PANEL: CPT

## 2022-01-25 PROCEDURE — 36415 COLL VENOUS BLD VENIPUNCTURE: CPT

## 2022-01-25 PROCEDURE — 99215 OFFICE O/P EST HI 40 MIN: CPT | Performed by: INTERNAL MEDICINE

## 2022-01-25 ASSESSMENT — PAIN SCALES - GENERAL: PAINLEVEL: MILD PAIN (3)

## 2022-01-25 ASSESSMENT — MIFFLIN-ST. JEOR: SCORE: 1509.88

## 2022-01-25 NOTE — NURSING NOTE
Chief Complaint   Patient presents with     Blood Draw     Labs drawn via vpt by RN in lab. VS taken      Labs collected from venipuncture by RN. Vitals taken. Checked in for next appointment.      Candice Foster RN

## 2022-01-25 NOTE — PROGRESS NOTES
BMT Progress Note     ID: June Ronquillo is a 50 yo man s/p auto PBSCT for high risk MM, here for routine review.      HPI:   He is feeling overall well, back to work. No infectious concerns, no F/C/, NS, no CP/SOB, no N/V/C/D. No bleeding, stable PN inn left foot more than right- overall better since November 11. Eating and drinking well.  Line was removed.    ROS: 12 point Negative except as stated above in HPI       Diagnosis and Treatment Summary      Disease presentation and baseline characteristics: nontraumatic rib fracture     Date Treatment Name Response Side Effects / Toxicities    May to October 2021 11/11/21 RVD with daratumumab      Autologous hematopoietic cell transplantation, melphalans 200mg/m2, CD34 cell dose  6.99x10^6 VGPR        CR, biochemically Peripheral neuropathy        neutropenic fevers, abdominal pain, nausea and diarrhea               BMT Work Up Results      Blood Counts          Recent Labs   Lab Test 11/10/21  0842 11/05/21  0840 11/04/21  0906 11/03/21  1000 10/20/21  1145 10/14/21  0943   HGB 12.0* 11.8* 12.0*   < > 13.0* 13.5   HCT 36.5* 36.5* 37.3*   < > 38.7* 38.1*   WBC 5.7 70.8* 69.5*   < > 6.7 6.5   ANEUTAUTO 3.3  --   --   --  3.3 3.8   ALYMPAUTO 1.4  --   --   --  2.1 1.6   AMONOAUTO 0.8  --   --   --  0.8 0.7   AEOSAUTO 0.2  --   --   --  0.4 0.4   ABSBASO 0.0  --   --   --  0.0 0.1   NRBCMAN 0.0  --   --   --  0.0 0.0    129* 321   < > 456* 489*    < > = values in this interval not displayed.       Bone Marrow 10/14/21     Bone marrow, posterior iliac crest, left decalcified trephine biopsy and touch imprint; left particle crush, direct aspirate smear, and concentrated aspirate smear; and peripheral smear:  -No morphologic or immunophenotypic evidence of plasma cell neoplasm  -Normocellular marrow for age (variable ranging 40% to 60%, overall estimated at 50%) with orderly trilineage hematopoietic maturation, megakaryocytic hyperplasia, and 1% plasma  cells  - Peripheral blood showing slight normochromic, normocytic anemia; slight thrombocytosis  - See comment     Flow: A. Iliac Crest, Left:  -Polytypic plasma cells  See comment            Blood Type     Recent Labs   Lab Test 10/13/21  1354   ABORH A POS          Chemistries       Recent Labs   Lab Test 11/10/21  0842 11/05/21  0840 11/04/21  0906    138 139   POTASSIUM 3.8 3.8 3.9   CHLORIDE 104 103 108   CO2 27 27 24   BUN 12 10 9   CR 0.89 0.93 0.84   * 144* 154*       Liver Tests       Recent Labs   Lab Test 11/10/21  0842 10/20/21  1145 10/14/21  0943   BILITOTAL 0.3 0.5 0.6   ALKPHOS 107 64 66   AST 10 18 15   ALT 19 17 15   ALBUMIN 3.6 4.2 4.1       PET/CT: 10/15/21     IMPRESSION:  Scattered non-FDG avid lytic predominant skeletal lesions consistent with multiple myeloma, similar to 08/31/2021. Findings suggest quiescent disease.      Chest X-Ray: Results for orders placed in visit on 11/03/21     XR CHEST 2 VW    Status: Normal 11/3/2021     Narrative  Exam: XR CHEST 2 VW, 11/3/2021 2:40 PM     Indication: check central line placement; Encounter for central line  placement     Comparison: Radiograph 10/18/2021     Findings:  PA and lateral views of the chest. Midline trachea. No pneumothorax or  pleural effusions. No airspace opacities. Normal cardiac silhouette  and mediastinum. No acute osseous abnormalities. Unremarkable upper  abdomen. Right IJ dialysis catheter with the tip in the mid SVC.     Impression  Impression: Right dialysis catheter with the tip in the mid SVC.  Otherwise normal chest radiograph.     I have personally reviewed the examination and initial interpretation  and I agree with the findings.     CASSANDRA RUSSO MD        SYSTEM ID:  [id number]         Chest CT No results found for this or any previous visit.      PFTs FVC%      Recent Labs   Lab Test 10/13/21  1403   20003 64         FEV1%      Recent Labs   Lab Test 10/13/21  1403   20016 67         DLCO%       Recent Labs   Lab Test 10/13/21  1403   10643 70          ECHO or MUGA:    10/18/21  Interpretation Summary  Global and regional left ventricular function is normal with an EF of 60-65%.  Global peak LV longitudinal strain is averaged at -18.6%. This is within  reported normal limits (normal <-18%).  Right ventricular function, chamber size, wall motion, and thickness are  normal.  No significant valvular abnormalities were noted.  Previous study not available for comparison.      EKG       ECG results from 10/13/21   EKG 12-lead complete w/read - Clinics     Value     Systolic Blood Pressure       Diastolic Blood Pressure       Ventricular Rate 85     Atrial Rate 85     IA Interval 140     QRS Duration 86          QTc 454     P Axis 64     R AXIS 16     T Axis 29     Interpretation ECG         Sinus rhythm  Nonspecific ST and T wave abnormality  Abnormal ECG  When compared with ECG of 17-MAY-2021 09:43,  Nonspecific T wave abnormality, improved in Inferior leads  T wave inversion no longer evident in Anterolateral leads  Confirmed by Nolan Kern ([id number]) on 10/14/2021 10:21:05 AM          Serologies: CMV: No lab results found.  EBV:       Recent Labs   Lab Test 10/13/21  1354   EBVCAG Positive*      HSV:       Recent Labs   Lab Test 10/13/21  1354   H2XTCPA 10.60*   H1IGG Positive.  IgG antibody to HSV-1 detected.*   L4IAMIN 0.05   H2IGG No HSV-2 IgG antibodies detected.      VZV: No lab results found.  HTLV:       Recent Labs   Lab Test 10/13/21  1354   DHTLVA Non-Reactive       Vitamin D     Recent Labs   Lab Test 01/25/21  1633   VITDT 29      [facility] [place] IDMs     Recent Labs   Lab Test 10/13/21  1354   DCMIG Positive*   DHBSAG Non-Reactive   DHBCAB Non-Reactive   DHIVAB Non-Reactive   DHCVAB Non-Reactive   DHTLVA Non-Reactive   TCRUZI Non-Reactive   DTRPAB Non-Reactive               Physical Exam  /86   Pulse 84   Temp 97.6  F (36.4  C) (Oral)   Resp 16   Wt 73.8 kg (162 lb 11.2  oz)   SpO2 100%   BMI 27.07 kg/m      General: NAD,   Eyes: sclera anicteric   Lungs: CTA bilaterally  Cardiovascular: RRR, no M/R/G   Abdominal/Rectal: +BS, soft, no tenderness to palpation  Lymphatics: No edema  Skin: No rashes  Neuro: A&O   Catheter site well-healed     Labs:  Lab Results   Component Value Date    WBC 6.8 01/19/2022    ANEU 7.4 11/29/2021    HGB 11.4 (L) 01/19/2022    HCT 33.3 (L) 01/19/2022     01/19/2022     01/19/2022    POTASSIUM 3.6 01/19/2022    CHLORIDE 111 (H) 01/19/2022    CO2 22 01/19/2022     (H) 01/19/2022    BUN 16 01/19/2022    CR 1.16 01/19/2022    MAG 2.1 12/07/2021    INR 1.16 (H) 11/23/2021    BILITOTAL 0.2 01/19/2022    AST 19 01/19/2022    ALT 25 01/19/2022    ALKPHOS 42 01/19/2022    PROTTOTAL 6.4 (L) 01/19/2022    ALBUMIN 4.1 01/19/2022       I have assessed all abnormal lab values for their clinical significance and any values considered clinically significant have been addressed in the assessment and plan.      A/P: June Ronquillo is a 52 yo man D+75 s/p auto PBSCT for high risk MM      1. BMT  - Cell dose 6.99 x 10^6; high risk disease  - GCSF completed 11/23.   - 12/7/2021: Light chains low, SPEP/IFN no monoclonal proteins.  - 1/19/2022 Day 60 biochemical assessment: FLC K 0.14, FLCL 0.3, K/L 0.47, IgG 204, IgG 7, IgM <10. SPEP/IFN: Marked hypogammaglobulinemia. No monoclonal protein seen on electrophoresis, immunofixation.  - Will return to [facility] for maintenance (consider lenalidomide + vidya off study due to high risk disease and neuropathy, other consideration are for PI  Or single agent IMids), Zometa and other routine care.     2. HEME/COAG:  Mild anemia, transfusion independent     3. ID.    Bactrim started 12/13/2021, on ACV and continue both for one year  Fluconazole until day +60  Received euvsheld, influenza vaccine and covid boosters after day 100 will discus on next BAN    4. GI/NUTRITION: Improving, no concerns      5. CV: aspirin,  statin, over-the-counter fish oil, losartan.  May return to his cardiologist for follow-up.  - Hx of CAD, MI at age 28 with stent placed  - HTN: 11/26 resumed losartan 50mg daily. Cont to hold hydrochlorothiazide      6. RENAL/FEN:  Cr and lytes stable    7. ENDOCRINE: Referred back for the thyroidectomy, he will let us know if he needs help scheduling with ENT and endocrine  - hx hypothyroidism, cont synthroid  - new diagnosis of papillary thyroid carcinoma per bx result from 11/2, plan is for thyroidectomy after BMT     8. Neuro: PN better on B complex, off gabapentin    RTC Dr Du Newsome day +100; Call come sooner if needed  Continue Acyclovir 800 mg twice daily until 11/11/22  Start bactrim DS 1 tab twice daily MON and TUE until 11/11/22  Referred back to [facility] for maintainence and other care     40 minutes spent on the date of the encounter doing chart review, history and exam, documentation and further activities per the note      Cassie Newsome MD

## 2022-01-25 NOTE — NURSING NOTE
"Oncology Rooming Note    January 25, 2022 3:43 PM   June Ronquillo is a 51 year old male who presents for:    Chief Complaint   Patient presents with     Blood Draw     Labs drawn via vpt by RN in lab. VS taken      Oncology Clinic Visit     [facility] RETURN - MULTIPLE MYELOMA     Initial Vitals: /86   Pulse 84   Temp 97.6  F (36.4  C) (Oral)   Resp 16   Ht 1.651 m (5' 5\")   Wt 73.8 kg (162 lb 11.2 oz)   SpO2 100%   BMI 27.07 kg/m   Estimated body mass index is 27.07 kg/m  as calculated from the following:    Height as of this encounter: 1.651 m (5' 5\").    Weight as of this encounter: 73.8 kg (162 lb 11.2 oz). Body surface area is 1.84 meters squared.  Mild Pain (3) Comment: Data Unavailable   No LMP for male patient.  Allergies reviewed: Yes  Medications reviewed: Yes    Medications: Medication refills not needed today.  Pharmacy name entered into [employer]:    [facility] [place], [place] - [address]  [facility] [place], [place] - [address]    Clinical concerns: No new concerns. Du Newsome was notified.      Juan Saenz LPN            "

## 2022-01-25 NOTE — LETTER
1/25/2022         RE: June Ronquillo  [address]  [facility] [address]        Dear Colleague,    Thank you for referring your patient, June Ronquillo, to the [facility] [place]. Please see a copy of my visit note below.    BMT Progress Note     ID: June Ronquillo is a 52 yo man s/p auto PBSCT for high risk MM, here for routine review.      HPI:   He is feeling overall well, back to work. No infectious concerns, no F/C/, NS, no CP/SOB, no N/V/C/D. No bleeding, stable PN inn left foot more than right- overall better since November 11. Eating and drinking well.  Line was removed.    ROS: 12 point Negative except as stated above in HPI       Diagnosis and Treatment Summary      Disease presentation and baseline characteristics: nontraumatic rib fracture     Date Treatment Name Response Side Effects / Toxicities    May to October 2021 11/11/21 RVD with daratumumab      Autologous hematopoietic cell transplantation, melphalans 200mg/m2, CD34 cell dose  6.99x10^6 VGPR        CR, biochemically Peripheral neuropathy        neutropenic fevers, abdominal pain, nausea and diarrhea               BMT Work Up Results      Blood Counts          Recent Labs   Lab Test 11/10/21  0842 11/05/21  0840 11/04/21  0906 11/03/21  1000 10/20/21  1145 10/14/21  0943   HGB 12.0* 11.8* 12.0*   < > 13.0* 13.5   HCT 36.5* 36.5* 37.3*   < > 38.7* 38.1*   WBC 5.7 70.8* 69.5*   < > 6.7 6.5   ANEUTAUTO 3.3  --   --   --  3.3 3.8   ALYMPAUTO 1.4  --   --   --  2.1 1.6   AMONOAUTO 0.8  --   --   --  0.8 0.7   AEOSAUTO 0.2  --   --   --  0.4 0.4   ABSBASO 0.0  --   --   --  0.0 0.1   NRBCMAN 0.0  --   --   --  0.0 0.0    129* 321   < > 456* 489*    < > = values in this interval not displayed.       Bone Marrow 10/14/21     Bone marrow, posterior iliac crest, left decalcified trephine biopsy and touch imprint; left particle crush, direct aspirate smear, and  concentrated aspirate smear; and peripheral smear:  -No morphologic or immunophenotypic evidence of plasma cell neoplasm  -Normocellular marrow for age (variable ranging 40% to 60%, overall estimated at 50%) with orderly trilineage hematopoietic maturation, megakaryocytic hyperplasia, and 1% plasma cells  - Peripheral blood showing slight normochromic, normocytic anemia; slight thrombocytosis  - See comment     Flow: A. Iliac Crest, Left:  -Polytypic plasma cells  See comment            Blood Type     Recent Labs   Lab Test 10/13/21  1354   ABORH A POS          Chemistries       Recent Labs   Lab Test 11/10/21  0842 11/05/21  0840 11/04/21  0906    138 139   POTASSIUM 3.8 3.8 3.9   CHLORIDE 104 103 108   CO2 27 27 24   BUN 12 10 9   CR 0.89 0.93 0.84   * 144* 154*       Liver Tests       Recent Labs   Lab Test 11/10/21  0842 10/20/21  1145 10/14/21  0943   BILITOTAL 0.3 0.5 0.6   ALKPHOS 107 64 66   AST 10 18 15   ALT 19 17 15   ALBUMIN 3.6 4.2 4.1       PET/CT: 10/15/21     IMPRESSION:  Scattered non-FDG avid lytic predominant skeletal lesions consistent with multiple myeloma, similar to 08/31/2021. Findings suggest quiescent disease.      Chest X-Ray: Results for orders placed in visit on 11/03/21     XR CHEST 2 VW    Status: Normal 11/3/2021     Narrative  Exam: XR CHEST 2 VW, 11/3/2021 2:40 PM     Indication: check central line placement; Encounter for central line  placement     Comparison: Radiograph 10/18/2021     Findings:  PA and lateral views of the chest. Midline trachea. No pneumothorax or  pleural effusions. No airspace opacities. Normal cardiac silhouette  and mediastinum. No acute osseous abnormalities. Unremarkable upper  abdomen. Right IJ dialysis catheter with the tip in the mid SVC.     Impression  Impression: Right dialysis catheter with the tip in the mid SVC.  Otherwise normal chest radiograph.     I have personally reviewed the examination and initial interpretation  and I agree  with the findings.     CASSANDRA RUSSO MD        SYSTEM ID:  [id number]         Chest CT No results found for this or any previous visit.      PFTs FVC%      Recent Labs   Lab Test 10/13/21  1403   [id number] 64         FEV1%      Recent Labs   Lab Test 10/13/21  1403   52211 67         DLCO%      Recent Labs   Lab Test 10/13/21  1403   20143 70          ECHO or MUGA:    10/18/21  Interpretation Summary  Global and regional left ventricular function is normal with an EF of 60-65%.  Global peak LV longitudinal strain is averaged at -18.6%. This is within  reported normal limits (normal <-18%).  Right ventricular function, chamber size, wall motion, and thickness are  normal.  No significant valvular abnormalities were noted.  Previous study not available for comparison.      EKG       ECG results from 10/13/21   EKG 12-lead complete w/read - Clinics     Value     Systolic Blood Pressure       Diastolic Blood Pressure       Ventricular Rate 85     Atrial Rate 85     OK Interval 140     QRS Duration 86          QTc 454     P Axis 64     R AXIS 16     T Axis 29     Interpretation ECG         Sinus rhythm  Nonspecific ST and T wave abnormality  Abnormal ECG  When compared with ECG of 17-MAY-2021 09:43,  Nonspecific T wave abnormality, improved in Inferior leads  T wave inversion no longer evident in Anterolateral leads  Confirmed by Nolan Kern ([id number]) on 10/14/2021 10:21:05 AM          Serologies: CMV: No lab results found.  EBV:       Recent Labs   Lab Test 10/13/21  1354   EBVCAG Positive*      HSV:       Recent Labs   Lab Test 10/13/21  1354   Y1SRSTC 10.60*   H1IGG Positive.  IgG antibody to HSV-1 detected.*   P3XHANG 0.05   H2IGG No HSV-2 IgG antibodies detected.      VZV: No lab results found.  HTLV:       Recent Labs   Lab Test 10/13/21  1354   DHTLVA Non-Reactive       Vitamin D     Recent Labs   Lab Test 01/25/21  1633   VITDT 29      [facility] [place] IDMs     Recent Labs   Lab Test 10/13/21  1354    DCMIG Positive*   DHBSAG Non-Reactive   DHBCAB Non-Reactive   DHIVAB Non-Reactive   DHCVAB Non-Reactive   DHTLVA Non-Reactive   TCRUZI Non-Reactive   DTRPAB Non-Reactive               Physical Exam  /86   Pulse 84   Temp 97.6  F (36.4  C) (Oral)   Resp 16   Wt 73.8 kg (162 lb 11.2 oz)   SpO2 100%   BMI 27.07 kg/m      General: NAD,   Eyes: sclera anicteric   Lungs: CTA bilaterally  Cardiovascular: RRR, no M/R/G   Abdominal/Rectal: +BS, soft, no tenderness to palpation  Lymphatics: No edema  Skin: No rashes  Neuro: A&O   Catheter site well-healed     Labs:  Lab Results   Component Value Date    WBC 6.8 01/19/2022    ANEU 7.4 11/29/2021    HGB 11.4 (L) 01/19/2022    HCT 33.3 (L) 01/19/2022     01/19/2022     01/19/2022    POTASSIUM 3.6 01/19/2022    CHLORIDE 111 (H) 01/19/2022    CO2 22 01/19/2022     (H) 01/19/2022    BUN 16 01/19/2022    CR 1.16 01/19/2022    MAG 2.1 12/07/2021    INR 1.16 (H) 11/23/2021    BILITOTAL 0.2 01/19/2022    AST 19 01/19/2022    ALT 25 01/19/2022    ALKPHOS 42 01/19/2022    PROTTOTAL 6.4 (L) 01/19/2022    ALBUMIN 4.1 01/19/2022       I have assessed all abnormal lab values for their clinical significance and any values considered clinically significant have been addressed in the assessment and plan.      A/P: June Ronquillo is a 50 yo man D+75 s/p auto PBSCT for high risk MM      1. BMT  - Cell dose 6.99 x 10^6; high risk disease  - GCSF completed 11/23.   - 12/7/2021: Light chains low, SPEP/IFN no monoclonal proteins.  - 1/19/2022 Day 60 biochemical assessment: FLC K 0.14, FLCL 0.3, K/L 0.47, IgG 204, IgG 7, IgM <10. SPEP/IFN: Marked hypogammaglobulinemia. No monoclonal protein seen on electrophoresis, immunofixation.  - Will return to [facility] for maintenance (consider lenalidomide + vidya off study due to high risk disease and neuropathy, other consideration are for PI  Or single agent IMids), Zometa and other routine care.     2. HEME/COAG:  Mild  anemia, transfusion independent     3. ID.    Bactrim started 12/13/2021, on ACV and continue both for one year  Fluconazole until day +60  Received euvsheld, influenza vaccine and covid boosters after day 100 will discus on next BAN    4. GI/NUTRITION: Improving, no concerns      5. CV: aspirin, statin, over-the-counter fish oil, losartan.  May return to his cardiologist for follow-up.  - Hx of CAD, MI at age 28 with stent placed  - HTN: 11/26 resumed losartan 50mg daily. Cont to hold hydrochlorothiazide      6. RENAL/FEN:  Cr and lytes stable    7. ENDOCRINE: Referred back for the thyroidectomy, he will let us know if he needs help scheduling with ENT and endocrine  - hx hypothyroidism, cont synthroid  - new diagnosis of papillary thyroid carcinoma per bx result from 11/2, plan is for thyroidectomy after BMT     8. Neuro: PN better on B complex, off gabapentin    RTC Dr Du Newsome day +100; Call come sooner if needed  Continue Acyclovir 800 mg twice daily until 11/11/22  Start bactrim DS 1 tab twice daily MON and TUE until 11/11/22  Referred back to [facility] for maintainence and other care     40 minutes spent on the date of the encounter doing chart review, history and exam, documentation and further activities per the note    Cassie Newsome MD         Again, thank you for allowing me to participate in the care of your patient.      Sincerely,    Cassie Newsome MD

## 2022-01-28 DIAGNOSIS — C90.00 MULTIPLE MYELOMA NOT HAVING ACHIEVED REMISSION (H): ICD-10-CM

## 2022-02-05 RX ORDER — HEPARIN SODIUM,PORCINE 10 UNIT/ML
5 VIAL (ML) INTRAVENOUS
Status: CANCELLED | OUTPATIENT
Start: 2022-07-11

## 2022-02-05 RX ORDER — HEPARIN SODIUM (PORCINE) LOCK FLUSH IV SOLN 100 UNIT/ML 100 UNIT/ML
5 SOLUTION INTRAVENOUS
Status: CANCELLED | OUTPATIENT
Start: 2022-07-11

## 2022-02-06 NOTE — PROGRESS NOTES
Oncologic Hx:   - 4/30/2021 M spike of 34.8 in urine.M spike in blood 0.2; IgG 702 with depressed IgA and IgM. Beta 2 microglobulin 2.7. Lambda  with K/L ratio of 0.01. WBC 11.1 with absolute eos of 1.0. hgb, plt, Cr (1.1), and albumin WNL.    -4/30/2021 CT abd/pelvis showed sclerotic marrow changes with numerous lytic appearing lesions throughout the imaged portions of the spine, pelvis and ribs.      -PET scan 5/11/2021 Numerous osteolytic lesions which predominantly demonstrate uptake below liver background levels. There is one intraosseous lesion in the left lateral 9th rib that demonstrates uptake above background, possibly due to nondisplaced fracture. Adjacent to this lesion is mild  hypermetabolism to the left pleura, with new small left pleural effusion, suspicious for malignant effusion versus posttraumatic effusion. Pleural uptake may represent extramedullary myeloma extending along the intercostal space versus inflammation.    - BM bx 5/17/2021 with flow showing 4.0% plasma cells which express CD19 (dim), CD38 (bright), CD45 (dim), , and monotypic cytoplasmic lambda immunoglobulin light chains but lack CD20 and CD56.  Hypercellular bone marrow for age (approximately 75% cellularity) with adequate trilineage hematopoiesis. Plasma cell infiltrate: Interstitial, lambda monotypic and representing approximately 60% the bone marrow cellularity, by  immunohistochemical stain. Cytpgenetics with 2 related hypodiploid clones. One with loss of Y, monosomy 13 and 14 (5%) and one with translocation of 8p and 14, derivative 16 with long arm replaced by extra copy 1q,monosomy 21. FISH showed gain of 1q, loss of 13 and 14, IGH-MYC fusion t(8:14)    - Started Miracle-RVd 21 day with velcade on days 1,8,15.     -Cycle 2 Miracle-RVd. Dose reduce dex to 80 mg d/t fatigue and stomach upset on dex days. Also having cough with basilar streaking on CXR    - 8/31/2021 BM bx -rare suspicious plasma cells in touch  imprint. No increase in plasma cells by morph.    -8/31/2021 PET/CT Diffuse osteolytic lesions throughout the axial and appendicular skeleton. Increased sclerosis since prior exam 5/11/2021 without increased metabolism or size.  Hypermetabolic pretracheal lymph node as well as hypermetabolic level 2 lymph nodes within the right neck. These lymph nodes are likely reactive from autoimmune activation via medical therapy. Hypermetabolic subcentimeter nodules within the thyroid gland    - 11/11/2021 Autologous BMT    Interval Hx: Logan is Day 90 from his auto-SCT. He is doing well with improvement in his left foot neuropathy. He is working full time but feels pretty tired at times.     A comprehensive review of systems was completed and negative except as described above.       Physical Exam:   BP (!) 142/82 (BP Location: Right arm, Patient Position: Sitting, Cuff Size: Adult Regular)   Pulse 85   Temp 98  F (36.7  C) (Oral)   Resp 16   Wt 73.9 kg (162 lb 14.4 oz)   SpO2 99%   BMI 27.11 kg/m    Constitutional: NAD  Eyes: no scleral icterus  ENT: no oral lesions  Pulm: CTAB  CV: RRR, no m/r/g  GI: bowel sounds present, soft and nontender to palpation  MSK: No edema in the extremities  Neuro: alert, conversant, normal gait  Psych: appropriate mood and affect      Assessment and Plan  Logan has multiple myeloma with high risk cytogenetics. He got Vidya-RVD with VGPR, then underwent autologous transplant 11/11/21. Continue acyclovir daily for viral ppx and bactrim M/T for PJP ppx. Given his high risk cytogenetics will give vidya + revlimid maintenance therapy. Since he is only at day 90 and his last IgG was <300. Will start with revlimid 10 mg every day this cycle and start q 4 week darzalex with next cycle. Follow IgG and replete if it remains <300. He has only gotten 1 dose of zometa and had severe flu-like effects. Try this again but at 3 mg dose and see how he tolerates it. Continue Vit D. Continue B vitamin supplement  to help his neuropathy        Barbi Vazquez MD PhD  More than half of this 30 min visit was spent in counseling the patient

## 2022-02-09 ENCOUNTER — ONCOLOGY VISIT (OUTPATIENT)
Dept: ONCOLOGY | Facility: CLINIC | Age: 52
End: 2022-02-09
Attending: STUDENT IN AN ORGANIZED HEALTH CARE EDUCATION/TRAINING PROGRAM
Payer: COMMERCIAL

## 2022-02-09 ENCOUNTER — APPOINTMENT (OUTPATIENT)
Dept: LAB | Facility: CLINIC | Age: 52
End: 2022-02-09
Attending: STUDENT IN AN ORGANIZED HEALTH CARE EDUCATION/TRAINING PROGRAM
Payer: COMMERCIAL

## 2022-02-09 VITALS
TEMPERATURE: 98 F | BODY MASS INDEX: 27.11 KG/M2 | RESPIRATION RATE: 16 BRPM | OXYGEN SATURATION: 99 % | HEART RATE: 85 BPM | WEIGHT: 162.9 LBS | SYSTOLIC BLOOD PRESSURE: 142 MMHG | DIASTOLIC BLOOD PRESSURE: 82 MMHG

## 2022-02-09 DIAGNOSIS — Z79.899 ENCOUNTER FOR LONG-TERM (CURRENT) USE OF MEDICATIONS: ICD-10-CM

## 2022-02-09 DIAGNOSIS — C90.00 MULTIPLE MYELOMA NOT HAVING ACHIEVED REMISSION (H): Primary | ICD-10-CM

## 2022-02-09 DIAGNOSIS — E06.3 HYPOTHYROIDISM DUE TO HASHIMOTO'S THYROIDITIS: ICD-10-CM

## 2022-02-09 LAB
ALBUMIN SERPL-MCNC: 4.3 G/DL (ref 3.4–5)
ALP SERPL-CCNC: 53 U/L (ref 40–150)
ALT SERPL W P-5'-P-CCNC: 24 U/L (ref 0–70)
ANION GAP SERPL CALCULATED.3IONS-SCNC: 9 MMOL/L (ref 3–14)
AST SERPL W P-5'-P-CCNC: 19 U/L (ref 0–45)
BASOPHILS # BLD AUTO: 0 10E3/UL (ref 0–0.2)
BASOPHILS NFR BLD AUTO: 0 %
BILIRUB SERPL-MCNC: 0.3 MG/DL (ref 0.2–1.3)
BUN SERPL-MCNC: 18 MG/DL (ref 7–30)
CALCIUM SERPL-MCNC: 9.1 MG/DL (ref 8.5–10.1)
CHLORIDE BLD-SCNC: 106 MMOL/L (ref 94–109)
CO2 SERPL-SCNC: 24 MMOL/L (ref 20–32)
CREAT SERPL-MCNC: 1.14 MG/DL (ref 0.66–1.25)
EOSINOPHIL # BLD AUTO: 0.3 10E3/UL (ref 0–0.7)
EOSINOPHIL NFR BLD AUTO: 4 %
ERYTHROCYTE [DISTWIDTH] IN BLOOD BY AUTOMATED COUNT: 14.7 % (ref 10–15)
GFR SERPL CREATININE-BSD FRML MDRD: 77 ML/MIN/1.73M2
GLUCOSE BLD-MCNC: 100 MG/DL (ref 70–99)
HCT VFR BLD AUTO: 37.5 % (ref 40–53)
HGB BLD-MCNC: 12.3 G/DL (ref 13.3–17.7)
IMM GRANULOCYTES # BLD: 0 10E3/UL
IMM GRANULOCYTES NFR BLD: 0 %
LYMPHOCYTES # BLD AUTO: 3.7 10E3/UL (ref 0.8–5.3)
LYMPHOCYTES NFR BLD AUTO: 42 %
MCH RBC QN AUTO: 30.7 PG (ref 26.5–33)
MCHC RBC AUTO-ENTMCNC: 32.8 G/DL (ref 31.5–36.5)
MCV RBC AUTO: 94 FL (ref 78–100)
MONOCYTES # BLD AUTO: 0.9 10E3/UL (ref 0–1.3)
MONOCYTES NFR BLD AUTO: 11 %
NEUTROPHILS # BLD AUTO: 3.8 10E3/UL (ref 1.6–8.3)
NEUTROPHILS NFR BLD AUTO: 43 %
NRBC # BLD AUTO: 0 10E3/UL
NRBC BLD AUTO-RTO: 0 /100
PLATELET # BLD AUTO: 358 10E3/UL (ref 150–450)
POTASSIUM BLD-SCNC: 4.1 MMOL/L (ref 3.4–5.3)
PROT SERPL-MCNC: 6.8 G/DL (ref 6.8–8.8)
RBC # BLD AUTO: 4.01 10E6/UL (ref 4.4–5.9)
SODIUM SERPL-SCNC: 139 MMOL/L (ref 133–144)
WBC # BLD AUTO: 8.8 10E3/UL (ref 4–11)

## 2022-02-09 PROCEDURE — 82040 ASSAY OF SERUM ALBUMIN: CPT | Performed by: STUDENT IN AN ORGANIZED HEALTH CARE EDUCATION/TRAINING PROGRAM

## 2022-02-09 PROCEDURE — 85025 COMPLETE CBC W/AUTO DIFF WBC: CPT | Performed by: STUDENT IN AN ORGANIZED HEALTH CARE EDUCATION/TRAINING PROGRAM

## 2022-02-09 PROCEDURE — 36415 COLL VENOUS BLD VENIPUNCTURE: CPT | Performed by: STUDENT IN AN ORGANIZED HEALTH CARE EDUCATION/TRAINING PROGRAM

## 2022-02-09 PROCEDURE — G0463 HOSPITAL OUTPT CLINIC VISIT: HCPCS

## 2022-02-09 PROCEDURE — 99214 OFFICE O/P EST MOD 30 MIN: CPT | Performed by: STUDENT IN AN ORGANIZED HEALTH CARE EDUCATION/TRAINING PROGRAM

## 2022-02-09 PROCEDURE — 80053 COMPREHEN METABOLIC PANEL: CPT | Performed by: STUDENT IN AN ORGANIZED HEALTH CARE EDUCATION/TRAINING PROGRAM

## 2022-02-09 ASSESSMENT — PAIN SCALES - GENERAL: PAINLEVEL: MILD PAIN (3)

## 2022-02-09 NOTE — NURSING NOTE
"Oncology Rooming Note    February 9, 2022 1:43 PM   June Ronquillo is a 51 year old male who presents for:    Chief Complaint   Patient presents with     Blood Draw     Labs drawn via  by rn in lab. VS taken.     Oncology Clinic Visit     Multiple myeloma     Initial Vitals: BP (!) 142/82 (BP Location: Right arm, Patient Position: Sitting, Cuff Size: Adult Regular)   Pulse 85   Temp 98  F (36.7  C) (Oral)   Resp 16   Wt 73.9 kg (162 lb 14.4 oz)   SpO2 99%   BMI 27.11 kg/m   Estimated body mass index is 27.11 kg/m  as calculated from the following:    Height as of 1/25/22: 1.651 m (5' 5\").    Weight as of this encounter: 73.9 kg (162 lb 14.4 oz). Body surface area is 1.84 meters squared.  Mild Pain (3) Comment: Data Unavailable   No LMP for male patient.   Allergies reviewed: Yes  Medications reviewed: Yes    Medications: Medication refills not needed today.  Pharmacy name entered into [facility]: [facility] [place], [place] - [address]    Clinical concerns: Wondering if he can get all his medications sent to [facility].       Elenita Gatica LPN            "

## 2022-02-09 NOTE — LETTER
2/9/2022         RE: June Ronquillo  [address]  [facility] [address]        Dear Colleague,    Thank you for referring your patient, June Ronquillo, to the [facility]. Please see a copy of my visit note below.      Oncologic Hx:   - 4/30/2021 M spike of 34.8 in urine.M spike in blood 0.2; IgG 702 with depressed IgA and IgM. Beta 2 microglobulin 2.7. Lambda  with K/L ratio of 0.01. WBC 11.1 with absolute eos of 1.0. hgb, plt, Cr (1.1), and albumin WNL.    -4/30/2021 CT abd/pelvis showed sclerotic marrow changes with numerous lytic appearing lesions throughout the imaged portions of the spine, pelvis and ribs.      -PET scan 5/11/2021 Numerous osteolytic lesions which predominantly demonstrate uptake below liver background levels. There is one intraosseous lesion in the left lateral 9th rib that demonstrates uptake above background, possibly due to nondisplaced fracture. Adjacent to this lesion is mild  hypermetabolism to the left pleura, with new small left pleural effusion, suspicious for malignant effusion versus posttraumatic effusion. Pleural uptake may represent extramedullary myeloma extending along the intercostal space versus inflammation.    - BM bx 5/17/2021 with flow showing 4.0% plasma cells which express CD19 (dim), CD38 (bright), CD45 (dim), , and monotypic cytoplasmic lambda immunoglobulin light chains but lack CD20 and CD56.  Hypercellular bone marrow for age (approximately 75% cellularity) with adequate trilineage hematopoiesis. Plasma cell infiltrate: Interstitial, lambda monotypic and representing approximately 60% the bone marrow cellularity, by  immunohistochemical stain. Cytpgenetics with 2 related hypodiploid clones. One with loss of Y, monosomy 13 and 14 (5%) and one with translocation of 8p and 14, derivative 16 with long arm replaced by extra copy 1q,monosomy 21. FISH showed gain of 1q, loss of 13 and 14, IGH-MYC fusion  t(8:14)    - Started Vidya-RVd 21 day with velcade on days 1,8,15.     -Cycle 2 Vidya-RVd. Dose reduce dex to 80 mg d/t fatigue and stomach upset on dex days. Also having cough with basilar streaking on CXR    - 8/31/2021 BM bx -rare suspicious plasma cells in touch imprint. No increase in plasma cells by morph.    -8/31/2021 PET/CT Diffuse osteolytic lesions throughout the axial and appendicular skeleton. Increased sclerosis since prior exam 5/11/2021 without increased metabolism or size.  Hypermetabolic pretracheal lymph node as well as hypermetabolic level 2 lymph nodes within the right neck. These lymph nodes are likely reactive from autoimmune activation via medical therapy. Hypermetabolic subcentimeter nodules within the thyroid gland    - 11/11/2021 Autologous BMT    Interval Hx: Logan is Day 90 from his auto-SCT. He is doing well with improvement in his left foot neuropathy. He is working full time but feels pretty tired at times.     A comprehensive review of systems was completed and negative except as described above.       Physical Exam:   BP (!) 142/82 (BP Location: Right arm, Patient Position: Sitting, Cuff Size: Adult Regular)   Pulse 85   Temp 98  F (36.7  C) (Oral)   Resp 16   Wt 73.9 kg (162 lb 14.4 oz)   SpO2 99%   BMI 27.11 kg/m    Constitutional: NAD  Eyes: no scleral icterus  ENT: no oral lesions  Pulm: CTAB  CV: RRR, no m/r/g  GI: bowel sounds present, soft and nontender to palpation  MSK: No edema in the extremities  Neuro: alert, conversant, normal gait  Psych: appropriate mood and affect      Assessment and Plan  Logan has multiple myeloma with high risk cytogenetics. He got Vidya-RVD with VGPR, then underwent autologous transplant 11/11/21. Continue acyclovir daily for viral ppx and bactrim M/T for PJP ppx. Given his high risk cytogenetics will give vidya + revlimid maintenance therapy. Since he is only at day 90 and his last IgG was <300. Will start with revlimid 10 mg every day this cycle  and start q 4 week darzalex with next cycle. Follow IgG and replete if it remains <300. He has only gotten 1 dose of zometa and had severe flu-like effects. Try this again but at 3 mg dose and see how he tolerates it. Continue Vit D. Continue B vitamin supplement to help his neuropathy        Barbi Vazquez MD PhD  More than half of this 30 min visit was spent in counseling the patient        Again, thank you for allowing me to participate in the care of your patient.      Sincerely,    Barbi Vazquez MD

## 2022-02-14 DIAGNOSIS — C90.01 MULTIPLE MYELOMA IN REMISSION (H): Primary | ICD-10-CM

## 2022-02-14 DIAGNOSIS — Z94.84 HISTORY OF PERIPHERAL STEM CELL TRANSPLANT (H): ICD-10-CM

## 2022-02-15 ENCOUNTER — HOSPITAL ENCOUNTER (OUTPATIENT)
Facility: AMBULATORY SURGERY CENTER | Age: 52
End: 2022-02-15
Attending: NURSE PRACTITIONER
Payer: COMMERCIAL

## 2022-02-15 DIAGNOSIS — Z94.84 HISTORY OF PERIPHERAL STEM CELL TRANSPLANT (H): ICD-10-CM

## 2022-02-15 PROBLEM — C90.00 MULTIPLE MYELOMA NOT HAVING ACHIEVED REMISSION (H): Status: ACTIVE | Noted: 2021-05-13

## 2022-02-16 ENCOUNTER — PREP FOR PROCEDURE (OUTPATIENT)
Dept: OTOLARYNGOLOGY | Facility: CLINIC | Age: 52
End: 2022-02-16
Payer: COMMERCIAL

## 2022-02-16 DIAGNOSIS — Z11.59 ENCOUNTER FOR SCREENING FOR OTHER VIRAL DISEASES: Primary | ICD-10-CM

## 2022-02-16 DIAGNOSIS — C73 MALIGNANT NEOPLASM OF THYROID GLAND (H): Primary | ICD-10-CM

## 2022-02-16 RX ORDER — DEXAMETHASONE SODIUM PHOSPHATE 4 MG/ML
10 INJECTION, SOLUTION INTRA-ARTICULAR; INTRALESIONAL; INTRAMUSCULAR; INTRAVENOUS; SOFT TISSUE ONCE
Status: CANCELLED | OUTPATIENT
Start: 2022-02-16 | End: 2022-02-16

## 2022-02-19 ENCOUNTER — HOSPITAL ENCOUNTER (OUTPATIENT)
Dept: PET IMAGING | Facility: CLINIC | Age: 52
Discharge: HOME | End: 2022-02-19
Attending: INTERNAL MEDICINE | Admitting: INTERNAL MEDICINE
Payer: COMMERCIAL

## 2022-02-19 DIAGNOSIS — C90.00 MULTIPLE MYELOMA NOT HAVING ACHIEVED REMISSION (H): ICD-10-CM

## 2022-02-19 PROCEDURE — 78816 PET IMAGE W/CT FULL BODY: CPT | Mod: PS

## 2022-02-19 PROCEDURE — 343N000001 HC RX 343: Performed by: INTERNAL MEDICINE

## 2022-02-19 PROCEDURE — 78816 PET IMAGE W/CT FULL BODY: CPT | Mod: 26

## 2022-02-19 PROCEDURE — A9552 F18 FDG: HCPCS | Performed by: INTERNAL MEDICINE

## 2022-02-19 RX ADMIN — FLUDEOXYGLUCOSE F-18 10.02 MCI.: 500 INJECTION, SOLUTION INTRAVENOUS at 07:54

## 2022-02-20 ENCOUNTER — HEALTH MAINTENANCE LETTER (OUTPATIENT)
Age: 52
End: 2022-02-20

## 2022-02-21 NOTE — PROGRESS NOTES
BMT Progress Note     ID: June Ronquillo is a 50 yo man s/p auto PBSCT for high risk MM, here for day +100 review.      HPI:   He is feeling overall well, energy improved since last visit, almost back to normal, back to work. No infectious concerns, no F/C/, NS, no CP/SOB, no N/V/C/D. No bleeding, stable PN in left foot more than right- overall much better. Eating and drinking well.  Line was removed.    ROS: 12 point Negative except as stated above in HPI       Diagnosis and Treatment Summary      Disease presentation and baseline characteristics: nontraumatic rib fracture     Date Treatment Name Response Side Effects / Toxicities    May to October 2021 11/11/21 RVD with daratumumab      Autologous hematopoietic cell transplantation, melphalans 200mg/m2, CD34 cell dose  6.99x10^6 VGPR        CR, biochemically    Day 100 PET/CT CITLALLI, BMB pending, and biochemical markers pending Peripheral neuropathy        neutropenic fevers, abdominal pain, nausea and diarrhea               BMT Work Up Results      Blood Counts          Recent Labs   Lab Test 11/10/21  0842 11/05/21  0840 11/04/21  0906 11/03/21  1000 10/20/21  1145 10/14/21  0943   HGB 12.0* 11.8* 12.0*   < > 13.0* 13.5   HCT 36.5* 36.5* 37.3*   < > 38.7* 38.1*   WBC 5.7 70.8* 69.5*   < > 6.7 6.5   ANEUTAUTO 3.3  --   --   --  3.3 3.8   ALYMPAUTO 1.4  --   --   --  2.1 1.6   AMONOAUTO 0.8  --   --   --  0.8 0.7   AEOSAUTO 0.2  --   --   --  0.4 0.4   ABSBASO 0.0  --   --   --  0.0 0.1   NRBCMAN 0.0  --   --   --  0.0 0.0    129* 321   < > 456* 489*    < > = values in this interval not displayed.       Bone Marrow 10/14/21     Bone marrow, posterior iliac crest, left decalcified trephine biopsy and touch imprint; left particle crush, direct aspirate smear, and concentrated aspirate smear; and peripheral smear:  -No morphologic or immunophenotypic evidence of plasma cell neoplasm  -Normocellular marrow for age (variable ranging 40% to 60%, overall  estimated at 50%) with orderly trilineage hematopoietic maturation, megakaryocytic hyperplasia, and 1% plasma cells  - Peripheral blood showing slight normochromic, normocytic anemia; slight thrombocytosis  - See comment     Flow: A. Iliac Crest, Left:  -Polytypic plasma cells  See comment            Blood Type     Recent Labs   Lab Test 10/13/21  1354   ABORH A POS          Chemistries       Recent Labs   Lab Test 11/10/21  0842 11/05/21  0840 11/04/21  0906    138 139   POTASSIUM 3.8 3.8 3.9   CHLORIDE 104 103 108   CO2 27 27 24   BUN 12 10 9   CR 0.89 0.93 0.84   * 144* 154*       Liver Tests       Recent Labs   Lab Test 11/10/21  0842 10/20/21  1145 10/14/21  0943   BILITOTAL 0.3 0.5 0.6   ALKPHOS 107 64 66   AST 10 18 15   ALT 19 17 15   ALBUMIN 3.6 4.2 4.1       PET/CT: 10/15/21     IMPRESSION:  Scattered non-FDG avid lytic predominant skeletal lesions consistent with multiple myeloma, similar to 08/31/2021. Findings suggest quiescent disease.      Chest X-Ray: Results for orders placed in visit on 11/03/21     XR CHEST 2 VW    Status: Normal 11/3/2021     Narrative  Exam: XR CHEST 2 VW, 11/3/2021 2:40 PM     Indication: check central line placement; Encounter for central line  placement     Comparison: Radiograph 10/18/2021     Findings:  PA and lateral views of the chest. Midline trachea. No pneumothorax or  pleural effusions. No airspace opacities. Normal cardiac silhouette  and mediastinum. No acute osseous abnormalities. Unremarkable upper  abdomen. Right IJ dialysis catheter with the tip in the mid SVC.     Impression  Impression: Right dialysis catheter with the tip in the mid SVC.  Otherwise normal chest radiograph.     I have personally reviewed the examination and initial interpretation  and I agree with the findings.     CASSANDRA RUSSO MD        SYSTEM ID:  [id number]         Chest CT No results found for this or any previous visit.      PFTs FVC%      Recent Labs   Lab Test  10/13/21  1403   20003 64         FEV1%      Recent Labs   Lab Test 10/13/21  1403   20016 67         DLCO%      Recent Labs   Lab Test 10/13/21  1403   [id number] 70          ECHO or MUGA:    10/18/21  Interpretation Summary  Global and regional left ventricular function is normal with an EF of 60-65%.  Global peak LV longitudinal strain is averaged at -18.6%. This is within  reported normal limits (normal <-18%).  Right ventricular function, chamber size, wall motion, and thickness are  normal.  No significant valvular abnormalities were noted.  Previous study not available for comparison.      EKG       ECG results from 10/13/21   EKG 12-lead complete w/read - Clinics     Value     Systolic Blood Pressure       Diastolic Blood Pressure       Ventricular Rate 85     Atrial Rate 85     TN Interval 140     QRS Duration 86          QTc 454     P Axis 64     R AXIS 16     T Axis 29     Interpretation ECG         Sinus rhythm  Nonspecific ST and T wave abnormality  Abnormal ECG  When compared with ECG of 17-MAY-2021 09:43,  Nonspecific T wave abnormality, improved in Inferior leads  T wave inversion no longer evident in Anterolateral leads  Confirmed by Nolan Kern ([id number]) on 10/14/2021 10:21:05 AM          Serologies: CMV: No lab results found.  EBV:       Recent Labs   Lab Test 10/13/21  1354   EBVCAG Positive*      HSV:       Recent Labs   Lab Test 10/13/21  1354   [id number] 10.60*   H1IGG Positive.  IgG antibody to HSV-1 detected.*   Q2TGNRJ 0.05   H2IGG No HSV-2 IgG antibodies detected.      VZV: No lab results found.  HTLV:       Recent Labs   Lab Test 10/13/21  1354   DHTLVA Non-Reactive       Vitamin D     Recent Labs   Lab Test 01/25/21  1633   VITDT 29      [facility] IDMs     Recent Labs   Lab Test 10/13/21  1354   DCMIG Positive*   DHBSAG Non-Reactive   DHBCAB Non-Reactive   DHIVAB Non-Reactive   DHCVAB Non-Reactive   DHTLVA Non-Reactive   TCRUZI Non-Reactive   DTRPAB Non-Reactive                Physical Exam  /82   Pulse 98   Temp 98.3  F (36.8  C) (Oral)   Resp 18   Wt 77.2 kg (170 lb 4.8 oz)   SpO2 99%   BMI 28.34 kg/m    General: NAD,   Eyes: sclera anicteric   Lungs: CTA bilaterally  Cardiovascular: RRR, no M/R/G   Abdominal/Rectal: +BS, soft, no tenderness to palpation  Lymphatics: No edema  Skin: No rashes  Neuro: A&O      Labs:  Lab Results   Component Value Date    WBC 8.8 02/09/2022    ANEU 7.4 11/29/2021    HGB 12.3 (L) 02/09/2022    HCT 37.5 (L) 02/09/2022     02/09/2022     02/09/2022    POTASSIUM 4.1 02/09/2022    CHLORIDE 106 02/09/2022    CO2 24 02/09/2022     (H) 02/09/2022    BUN 18 02/09/2022    CR 1.14 02/09/2022    MAG 2.1 12/07/2021    INR 1.16 (H) 11/23/2021    BILITOTAL 0.3 02/09/2022    AST 19 02/09/2022    ALT 24 02/09/2022    ALKPHOS 53 02/09/2022    PROTTOTAL 6.8 02/09/2022    ALBUMIN 4.3 02/09/2022       I have assessed all abnormal lab values for their clinical significance and any values considered clinically significant have been addressed in the assessment and plan.      A/P: June Ronquillo is a 50 yo man D+103 s/p auto PBSCT for high risk MM      1. BMT  - Cell dose 6.99 x 10^6; high risk disease  - GCSF completed 11/23.   - 12/7/2021: Light chains low, SPEP/IFN no monoclonal proteins.  - 1/19/2022 Day 60 biochemical assessment: FLC K 0.14, FLCL 0.3, K/L 0.47, IgG 204, IgG 7, IgM <10. SPEP/IFN: Marked hypogammaglobulinemia. No monoclonal protein seen on electrophoresis, immunofixation.  - Will return to [facility] maintenance (consider lenalidomide + vidya off study due to high risk disease and neuropathy, other consideration are for PI  Or single agent IMids), Zometa and other routine care.  - 2/196/2022 PET/CT: No FDG avid bone lesions. Numerous lytic lesions predominantly in  the axial skeleton are stable.  2. FDG avid left thyroid nodule, most likely representing a papillary  thyroid carcinoma. Consider further evaluation with  thyroid  ultrasound.     2. HEME/COAG:  Mild anemia, transfusion independent      3. ID.    Bactrim started 12/13/2021, on ACV and continue both for one year  Fluconazole until day +60, completed  Received euvsheld, influenza vaccine and covid boosters after day 100, will get locally not startig today due to commitments ans concern with SE     4. GI/NUTRITION: Improving, no concerns      5. CV: aspirin, statin, over-the-counter fish oil, losartan. Return to his cardiologist for follow-up.  - Hx of CAD, MI at age 28 with stent placed  - HTN: 11/26 resumed losartan 50mg daily. Cont to hold hydrochlorothiazide      6. RENAL/FEN:  Cr and lytes stable    7. ENDOCRINE: Referred back for the thyroidectomy, he will let us know if he needs help scheduling with ENT and endocrine, referred today  - hx hypothyroidism, cont synthroid  - new diagnosis of papillary thyroid carcinoma per bx result from 11/2, plan is for thyroidectomy after BMT  - Zometa with oncology, ca/vitD     8. Neuro: PN better on B complex, off gabapentin    RTC Dr Du Newsome day +180; Call come sooner if needed  BMB planned for March, will call with results of this and biochemical tresting  Continue Acyclovir 800 mg twice daily and bactrim DS 1 tab twice daily MON and TUE until 11/11/22  Referred back to [facility] for maintainence and other care     40 minutes spent on the date of the encounter doing chart review, history and exam, documentation and further activities per the note      Cassie Nwesome MD

## 2022-02-22 ENCOUNTER — OFFICE VISIT (OUTPATIENT)
Dept: TRANSPLANT | Facility: CLINIC | Age: 52
End: 2022-02-22
Attending: INTERNAL MEDICINE
Payer: COMMERCIAL

## 2022-02-22 VITALS
WEIGHT: 170.3 LBS | TEMPERATURE: 98.3 F | OXYGEN SATURATION: 99 % | HEART RATE: 98 BPM | RESPIRATION RATE: 18 BRPM | BODY MASS INDEX: 28.34 KG/M2 | SYSTOLIC BLOOD PRESSURE: 134 MMHG | DIASTOLIC BLOOD PRESSURE: 82 MMHG

## 2022-02-22 DIAGNOSIS — E04.1 THYROID NODULE: Primary | ICD-10-CM

## 2022-02-22 PROCEDURE — 99214 OFFICE O/P EST MOD 30 MIN: CPT | Performed by: INTERNAL MEDICINE

## 2022-02-22 PROCEDURE — G0463 HOSPITAL OUTPT CLINIC VISIT: HCPCS

## 2022-02-22 ASSESSMENT — PAIN SCALES - GENERAL: PAINLEVEL: NO PAIN (0)

## 2022-02-22 NOTE — NURSING NOTE
"Oncology Rooming Note    February 22, 2022 3:34 PM   June Ronquillo is a 51 year old male who presents for:    Chief Complaint   Patient presents with     Oncology Clinic Visit     Multiple myeloma not having achieved remission (H)     Initial Vitals: BP (!) 151/92   Pulse 104   Temp 98.3  F (36.8  C) (Oral)   Resp 18   Wt 77.2 kg (170 lb 4.8 oz)   SpO2 100%   BMI 28.34 kg/m   Estimated body mass index is 28.34 kg/m  as calculated from the following:    Height as of 1/25/22: 1.651 m (5' 5\").    Weight as of this encounter: 77.2 kg (170 lb 4.8 oz). Body surface area is 1.88 meters squared.  No Pain (0) Comment: Data Unavailable   No LMP for male patient.  Allergies reviewed: Yes  Medications reviewed: Yes    Medications: Medication refills not needed today.  Pharmacy name entered into [employer]: [facility] [place], [place] - [address]    Clinical concerns: None.       Eleanor Gil CMA            "

## 2022-02-22 NOTE — LETTER
2/22/2022         RE: June Ronquillo  [address]  [facility] [address]        Dear Colleague,    Thank you for referring your patient, June Ronquillo, to the [facility] BLOOD AND MARROW TRANSPLANT PROGRAM [place]. Please see a copy of my visit note below.    BMT Progress Note     ID: June Ronquillo is a 52 yo man s/p auto PBSCT for high risk MM, here for day +100 review.      HPI:   He is feeling overall well, energy improved since last visit, almost back to normal, back to work. No infectious concerns, no F/C/, NS, no CP/SOB, no N/V/C/D. No bleeding, stable PN in left foot more than right- overall much better. Eating and drinking well.  Line was removed.    ROS: 12 point Negative except as stated above in HPI       Diagnosis and Treatment Summary      Disease presentation and baseline characteristics: nontraumatic rib fracture     Date Treatment Name Response Side Effects / Toxicities    May to October 2021 11/11/21 RVD with daratumumab      Autologous hematopoietic cell transplantation, melphalans 200mg/m2, CD34 cell dose  6.99x10^6 VGPR        CR, biochemically    Day 100 PET/CT CITLALLI, BMB pending, and biochemical markers pending Peripheral neuropathy        neutropenic fevers, abdominal pain, nausea and diarrhea               BMT Work Up Results      Blood Counts          Recent Labs   Lab Test 11/10/21  0842 11/05/21  0840 11/04/21  0906 11/03/21  1000 10/20/21  1145 10/14/21  0943   HGB 12.0* 11.8* 12.0*   < > 13.0* 13.5   HCT 36.5* 36.5* 37.3*   < > 38.7* 38.1*   WBC 5.7 70.8* 69.5*   < > 6.7 6.5   ANEUTAUTO 3.3  --   --   --  3.3 3.8   ALYMPAUTO 1.4  --   --   --  2.1 1.6   AMONOAUTO 0.8  --   --   --  0.8 0.7   AEOSAUTO 0.2  --   --   --  0.4 0.4   ABSBASO 0.0  --   --   --  0.0 0.1   NRBCMAN 0.0  --   --   --  0.0 0.0    129* 321   < > 456* 489*    < > = values in this interval not displayed.       Bone Marrow 10/14/21     Bone marrow, posterior iliac crest,  left decalcified trephine biopsy and touch imprint; left particle crush, direct aspirate smear, and concentrated aspirate smear; and peripheral smear:  -No morphologic or immunophenotypic evidence of plasma cell neoplasm  -Normocellular marrow for age (variable ranging 40% to 60%, overall estimated at 50%) with orderly trilineage hematopoietic maturation, megakaryocytic hyperplasia, and 1% plasma cells  - Peripheral blood showing slight normochromic, normocytic anemia; slight thrombocytosis  - See comment     Flow: A. Iliac Crest, Left:  -Polytypic plasma cells  See comment            Blood Type     Recent Labs   Lab Test 10/13/21  1354   ABORH A POS          Chemistries       Recent Labs   Lab Test 11/10/21  0842 11/05/21  0840 11/04/21  0906    138 139   POTASSIUM 3.8 3.8 3.9   CHLORIDE 104 103 108   CO2 27 27 24   BUN 12 10 9   CR 0.89 0.93 0.84   * 144* 154*       Liver Tests       Recent Labs   Lab Test 11/10/21  0842 10/20/21  1145 10/14/21  0943   BILITOTAL 0.3 0.5 0.6   ALKPHOS 107 64 66   AST 10 18 15   ALT 19 17 15   ALBUMIN 3.6 4.2 4.1       PET/CT: 10/15/21     IMPRESSION:  Scattered non-FDG avid lytic predominant skeletal lesions consistent with multiple myeloma, similar to 08/31/2021. Findings suggest quiescent disease.      Chest X-Ray: Results for orders placed in visit on 11/03/21     XR CHEST 2 VW    Status: Normal 11/3/2021     Narrative  Exam: XR CHEST 2 VW, 11/3/2021 2:40 PM     Indication: check central line placement; Encounter for central line  placement     Comparison: Radiograph 10/18/2021     Findings:  PA and lateral views of the chest. Midline trachea. No pneumothorax or  pleural effusions. No airspace opacities. Normal cardiac silhouette  and mediastinum. No acute osseous abnormalities. Unremarkable upper  abdomen. Right IJ dialysis catheter with the tip in the mid SVC.     Impression  Impression: Right dialysis catheter with the tip in the mid SVC.  Otherwise normal chest  radiograph.     I have personally reviewed the examination and initial interpretation  and I agree with the findings.     CASSANDRA RUSSO MD        SYSTEM ID:  [id number]         Chest CT No results found for this or any previous visit.      PFTs FVC%      Recent Labs   Lab Test 10/13/21  1403   [id number] 64         FEV1%      Recent Labs   Lab Test 10/13/21  1403   20016 67         DLCO%      Recent Labs   Lab Test 10/13/21  1403   20143 70          ECHO or MUGA:    10/18/21  Interpretation Summary  Global and regional left ventricular function is normal with an EF of 60-65%.  Global peak LV longitudinal strain is averaged at -18.6%. This is within  reported normal limits (normal <-18%).  Right ventricular function, chamber size, wall motion, and thickness are  normal.  No significant valvular abnormalities were noted.  Previous study not available for comparison.      EKG       ECG results from 10/13/21   EKG 12-lead complete w/read - Clinics     Value     Systolic Blood Pressure       Diastolic Blood Pressure       Ventricular Rate 85     Atrial Rate 85     FL Interval 140     QRS Duration 86          QTc 454     P Axis 64     R AXIS 16     T Axis 29     Interpretation ECG         Sinus rhythm  Nonspecific ST and T wave abnormality  Abnormal ECG  When compared with ECG of 17-MAY-2021 09:43,  Nonspecific T wave abnormality, improved in Inferior leads  T wave inversion no longer evident in Anterolateral leads  Confirmed by Nolan Kern ([id number]) on 10/14/2021 10:21:05 AM          Serologies: CMV: No lab results found.  EBV:       Recent Labs   Lab Test 10/13/21  1354   EBVCAG Positive*      HSV:       Recent Labs   Lab Test 10/13/21  1354   [id number] 10.60*   H1IGG Positive.  IgG antibody to HSV-1 detected.*   E1FWCDS 0.05   H2IGG No HSV-2 IgG antibodies detected.      VZV: No lab results found.  HTLV:       Recent Labs   Lab Test 10/13/21  1354   DHTLVA Non-Reactive       Vitamin D     Recent Labs   Lab Test  01/25/21  1633   VITDT 29      [facility] Blood [place] IDMs     Recent Labs   Lab Test 10/13/21  1354   DCMIG Positive*   DHBSAG Non-Reactive   DHBCAB Non-Reactive   DHIVAB Non-Reactive   DHCVAB Non-Reactive   DHTLVA Non-Reactive   TCRUZI Non-Reactive   DTRPAB Non-Reactive               Physical Exam  /82   Pulse 98   Temp 98.3  F (36.8  C) (Oral)   Resp 18   Wt 77.2 kg (170 lb 4.8 oz)   SpO2 99%   BMI 28.34 kg/m    General: NAD,   Eyes: sclera anicteric   Lungs: CTA bilaterally  Cardiovascular: RRR, no M/R/G   Abdominal/Rectal: +BS, soft, no tenderness to palpation  Lymphatics: No edema  Skin: No rashes  Neuro: A&O      Labs:  Lab Results   Component Value Date    WBC 8.8 02/09/2022    ANEU 7.4 11/29/2021    HGB 12.3 (L) 02/09/2022    HCT 37.5 (L) 02/09/2022     02/09/2022     02/09/2022    POTASSIUM 4.1 02/09/2022    CHLORIDE 106 02/09/2022    CO2 24 02/09/2022     (H) 02/09/2022    BUN 18 02/09/2022    CR 1.14 02/09/2022    MAG 2.1 12/07/2021    INR 1.16 (H) 11/23/2021    BILITOTAL 0.3 02/09/2022    AST 19 02/09/2022    ALT 24 02/09/2022    ALKPHOS 53 02/09/2022    PROTTOTAL 6.8 02/09/2022    ALBUMIN 4.3 02/09/2022       I have assessed all abnormal lab values for their clinical significance and any values considered clinically significant have been addressed in the assessment and plan.      A/P: June Ronquillo is a 50 yo man D+103 s/p auto PBSCT for high risk MM      1. BMT  - Cell dose 6.99 x 10^6; high risk disease  - GCSF completed 11/23.   - 12/7/2021: Light chains low, SPEP/IFN no monoclonal proteins.  - 1/19/2022 Day 60 biochemical assessment: FLC K 0.14, FLCL 0.3, K/L 0.47, IgG 204, IgG 7, IgM <10. SPEP/IFN: Marked hypogammaglobulinemia. No monoclonal protein seen on electrophoresis, immunofixation.  - Will return to [facility] for maintenance (consider lenalidomide + vidya off study due to high risk disease and neuropathy, other consideration are for PI  Or single agent  IMids), Zometa and other routine care.  - 2/196/2022 PET/CT: No FDG avid bone lesions. Numerous lytic lesions predominantly in  the axial skeleton are stable.  2. FDG avid left thyroid nodule, most likely representing a papillary  thyroid carcinoma. Consider further evaluation with thyroid  ultrasound.     2. HEME/COAG:  Mild anemia, transfusion independent      3. ID.    Bactrim started 12/13/2021, on ACV and continue both for one year  Fluconazole until day +60, completed  Received euvsheld, influenza vaccine and covid boosters after day 100, will get locally not startig today due to commitments ans concern with SE     4. GI/NUTRITION: Improving, no concerns      5. CV: aspirin, statin, over-the-counter fish oil, losartan. Return to his cardiologist for follow-up.  - Hx of CAD, MI at age 28 with stent placed  - HTN: 11/26 resumed losartan 50mg daily. Cont to hold hydrochlorothiazide      6. RENAL/FEN:  Cr and lytes stable    7. ENDOCRINE: Referred back for the thyroidectomy, he will let us know if he needs help scheduling with ENT and endocrine, referred today  - hx hypothyroidism, cont synthroid  - new diagnosis of papillary thyroid carcinoma per bx result from 11/2, plan is for thyroidectomy after BMT  - Zometa with oncology, ca/vitD     8. Neuro: PN better on B complex, off gabapentin    RTC Dr Du Newsome day +180; Call come sooner if needed  BMB planned for March, will call with results of this and biochemical tresting  Continue Acyclovir 800 mg twice daily and bactrim DS 1 tab twice daily MON and TUE until 11/11/22  Referred back to [facility] for maintainence and other care     40 minutes spent on the date of the encounter doing chart review, history and exam, documentation and further activities per the note          Again, thank you for allowing me to participate in the care of your patient.      Sincerely,    Cassie Newsome MD

## 2022-02-24 ENCOUNTER — DOCUMENTATION ONLY (OUTPATIENT)
Dept: ONCOLOGY | Facility: CLINIC | Age: 52
End: 2022-02-24

## 2022-02-24 ENCOUNTER — APPOINTMENT (OUTPATIENT)
Dept: LAB | Facility: CLINIC | Age: 52
End: 2022-02-24
Attending: STUDENT IN AN ORGANIZED HEALTH CARE EDUCATION/TRAINING PROGRAM
Payer: COMMERCIAL

## 2022-02-24 ENCOUNTER — TELEPHONE (OUTPATIENT)
Dept: ONCOLOGY | Facility: CLINIC | Age: 52
End: 2022-02-24

## 2022-02-24 ENCOUNTER — INFUSION THERAPY VISIT (OUTPATIENT)
Dept: ONCOLOGY | Facility: CLINIC | Age: 52
End: 2022-02-24
Attending: STUDENT IN AN ORGANIZED HEALTH CARE EDUCATION/TRAINING PROGRAM
Payer: COMMERCIAL

## 2022-02-24 VITALS
WEIGHT: 164.9 LBS | RESPIRATION RATE: 16 BRPM | TEMPERATURE: 97.9 F | OXYGEN SATURATION: 100 % | HEART RATE: 88 BPM | SYSTOLIC BLOOD PRESSURE: 134 MMHG | BODY MASS INDEX: 27.44 KG/M2 | DIASTOLIC BLOOD PRESSURE: 83 MMHG

## 2022-02-24 DIAGNOSIS — C90.00 MULTIPLE MYELOMA NOT HAVING ACHIEVED REMISSION (H): Primary | ICD-10-CM

## 2022-02-24 LAB
ALBUMIN SERPL-MCNC: 4.3 G/DL (ref 3.4–5)
ALP SERPL-CCNC: 60 U/L (ref 40–150)
ALT SERPL W P-5'-P-CCNC: 23 U/L (ref 0–70)
ANION GAP SERPL CALCULATED.3IONS-SCNC: 8 MMOL/L (ref 3–14)
AST SERPL W P-5'-P-CCNC: 22 U/L (ref 0–45)
BASOPHILS # BLD AUTO: 0 10E3/UL (ref 0–0.2)
BASOPHILS NFR BLD AUTO: 0 %
BILIRUB SERPL-MCNC: 0.3 MG/DL (ref 0.2–1.3)
BUN SERPL-MCNC: 22 MG/DL (ref 7–30)
CALCIUM SERPL-MCNC: 9.1 MG/DL (ref 8.5–10.1)
CHLORIDE BLD-SCNC: 107 MMOL/L (ref 94–109)
CO2 SERPL-SCNC: 21 MMOL/L (ref 20–32)
CREAT SERPL-MCNC: 1.09 MG/DL (ref 0.66–1.25)
EOSINOPHIL # BLD AUTO: 0.3 10E3/UL (ref 0–0.7)
EOSINOPHIL NFR BLD AUTO: 4 %
ERYTHROCYTE [DISTWIDTH] IN BLOOD BY AUTOMATED COUNT: 14.1 % (ref 10–15)
GFR SERPL CREATININE-BSD FRML MDRD: 81 ML/MIN/1.73M2
GLUCOSE BLD-MCNC: 116 MG/DL (ref 70–99)
HCT VFR BLD AUTO: 39.3 % (ref 40–53)
HGB BLD-MCNC: 13 G/DL (ref 13.3–17.7)
IMM GRANULOCYTES # BLD: 0 10E3/UL
IMM GRANULOCYTES NFR BLD: 0 %
LYMPHOCYTES # BLD AUTO: 2.6 10E3/UL (ref 0.8–5.3)
LYMPHOCYTES NFR BLD AUTO: 35 %
MCH RBC QN AUTO: 31.1 PG (ref 26.5–33)
MCHC RBC AUTO-ENTMCNC: 33.1 G/DL (ref 31.5–36.5)
MCV RBC AUTO: 94 FL (ref 78–100)
MONOCYTES # BLD AUTO: 0.7 10E3/UL (ref 0–1.3)
MONOCYTES NFR BLD AUTO: 10 %
NEUTROPHILS # BLD AUTO: 3.8 10E3/UL (ref 1.6–8.3)
NEUTROPHILS NFR BLD AUTO: 51 %
NRBC # BLD AUTO: 0 10E3/UL
NRBC BLD AUTO-RTO: 0 /100
PLATELET # BLD AUTO: 375 10E3/UL (ref 150–450)
POTASSIUM BLD-SCNC: 4.3 MMOL/L (ref 3.4–5.3)
PROT SERPL-MCNC: 6.9 G/DL (ref 6.8–8.8)
RBC # BLD AUTO: 4.18 10E6/UL (ref 4.4–5.9)
SODIUM SERPL-SCNC: 136 MMOL/L (ref 133–144)
TOTAL PROTEIN SERUM FOR ELP: 6.7 G/DL (ref 6.8–8.8)
WBC # BLD AUTO: 7.5 10E3/UL (ref 4–11)

## 2022-02-24 PROCEDURE — 84165 PROTEIN E-PHORESIS SERUM: CPT | Mod: TC | Performed by: PATHOLOGY

## 2022-02-24 PROCEDURE — 83521 IG LIGHT CHAINS FREE EACH: CPT

## 2022-02-24 PROCEDURE — 86334 IMMUNOFIX E-PHORESIS SERUM: CPT | Performed by: PATHOLOGY

## 2022-02-24 PROCEDURE — 96365 THER/PROPH/DIAG IV INF INIT: CPT

## 2022-02-24 PROCEDURE — 86334 IMMUNOFIX E-PHORESIS SERUM: CPT | Mod: 26 | Performed by: PATHOLOGY

## 2022-02-24 PROCEDURE — 84165 PROTEIN E-PHORESIS SERUM: CPT | Mod: 26 | Performed by: PATHOLOGY

## 2022-02-24 PROCEDURE — 82784 ASSAY IGA/IGD/IGG/IGM EACH: CPT

## 2022-02-24 PROCEDURE — 85025 COMPLETE CBC W/AUTO DIFF WBC: CPT

## 2022-02-24 PROCEDURE — 36415 COLL VENOUS BLD VENIPUNCTURE: CPT

## 2022-02-24 PROCEDURE — 258N000003 HC RX IP 258 OP 636: Performed by: STUDENT IN AN ORGANIZED HEALTH CARE EDUCATION/TRAINING PROGRAM

## 2022-02-24 PROCEDURE — 84155 ASSAY OF PROTEIN SERUM: CPT | Mod: 91

## 2022-02-24 PROCEDURE — 250N000011 HC RX IP 250 OP 636: Performed by: STUDENT IN AN ORGANIZED HEALTH CARE EDUCATION/TRAINING PROGRAM

## 2022-02-24 PROCEDURE — 80053 COMPREHEN METABOLIC PANEL: CPT

## 2022-02-24 RX ADMIN — SODIUM CHLORIDE 250 ML: 9 INJECTION, SOLUTION INTRAVENOUS at 15:00

## 2022-02-24 RX ADMIN — ZOLEDRONIC ACID 3 MG: 4 INJECTION, SOLUTION, CONCENTRATE INTRAVENOUS at 15:00

## 2022-02-24 ASSESSMENT — PAIN SCALES - GENERAL: PAINLEVEL: NO PAIN (0)

## 2022-02-24 NOTE — PROGRESS NOTES
Infusion Nursing Note:  Lázaro Ronquillo presents today for Zometa.    Patient seen by provider today: No   present during visit today: Not Applicable.    Note: Patient arrives to infusion feeling well today.  They deny signs and symptoms of infection including:fever, cough, shortness of breath, sore throat, diarrhea, vomiting, rash, or pain with urination.     Patient will  prescription for Calcium and Vitamin D this afternoon.  Patient states he has no dental problems.    TORB Dr. Vazquez/Kendal Reyes RN on 2/24/22  Patient to be getting Zometa monthly  Patient to take Revlimid for 4 weeks and then start Miracle.    Reviewed this information with patient.  Patient was not sure if he should be start this plan until after his BMBX results.  BMBX scheduled for March 7.  IB sent to oral chemotherapy, Sarwat Irby RNCC about patient's question and plan.  In IB asked Sarwat to update patient with the plan.    Patient scheduled for Zometa and Miracle in 4 weeks.  Miracle date may need to be adjusted.    Intravenous Access:  Peripheral IV placed.    Treatment Conditions:  Results for LÁZARO RONQUILLO (MRN [id number]) as of 2/24/2022 16:33   Ref. Range 2/24/2022 13:34   Sodium Latest Ref Range: 133 - 144 mmol/L 136   Potassium Latest Ref Range: 3.4 - 5.3 mmol/L 4.3   Chloride Latest Ref Range: 94 - 109 mmol/L 107   Carbon Dioxide Latest Ref Range: 20 - 32 mmol/L 21   Urea Nitrogen Latest Ref Range: 7 - 30 mg/dL 22   Creatinine Latest Ref Range: 0.66 - 1.25 mg/dL 1.09   GFR Estimate Latest Ref Range: >60 mL/min/1.73m2 81   Calcium Latest Ref Range: 8.5 - 10.1 mg/dL 9.1   Anion Gap Latest Ref Range: 3 - 14 mmol/L 8   Albumin Latest Ref Range: 3.4 - 5.0 g/dL 4.3   Protein Total Latest Ref Range: 6.8 - 8.8 g/dL 6.9   Bilirubin Total Latest Ref Range: 0.2 - 1.3 mg/dL 0.3   Alkaline Phosphatase Latest Ref Range: 40 - 150 U/L 60   ALT Latest Ref Range: 0 - 70 U/L 23   AST Latest Ref Range: 0 - 45 U/L 22    Glucose Latest Ref Range: 70 - 99 mg/dL 116 (H)   WBC Latest Ref Range: 4.0 - 11.0 10e3/uL 7.5   Hemoglobin Latest Ref Range: 13.3 - 17.7 g/dL 13.0 (L)   Hematocrit Latest Ref Range: 40.0 - 53.0 % 39.3 (L)   Platelet Count Latest Ref Range: 150 - 450 10e3/uL 375   RBC Count Latest Ref Range: 4.40 - 5.90 10e6/uL 4.18 (L)   MCV Latest Ref Range: 78 - 100 fL 94   MCH Latest Ref Range: 26.5 - 33.0 pg 31.1   MCHC Latest Ref Range: 31.5 - 36.5 g/dL 33.1   RDW Latest Ref Range: 10.0 - 15.0 % 14.1   % Neutrophils Latest Units: % 51   % Lymphocytes Latest Units: % 35   % Monocytes Latest Units: % 10   % Eosinophils Latest Units: % 4   % Basophils Latest Units: % 0   Absolute Basophils Latest Ref Range: 0.0 - 0.2 10e3/uL 0.0   Absolute Eosinophils Latest Ref Range: 0.0 - 0.7 10e3/uL 0.3   Absolute Immature Granulocytes Latest Ref Range: <=0.4 10e3/uL 0.0   Absolute Lymphocytes Latest Ref Range: 0.8 - 5.3 10e3/uL 2.6   Absolute Monocytes Latest Ref Range: 0.0 - 1.3 10e3/uL 0.7   % Immature Granulocytes Latest Units: % 0   Absolute Neutrophils Latest Ref Range: 1.6 - 8.3 10e3/uL 3.8   Absolute NRBCs Latest Units: 10e3/uL 0.0   NRBCs per 100 WBC Latest Ref Range: <1 /100 0     Results reviewed, labs MET treatment parameters, ok to proceed with treatment.      Post Infusion Assessment:  Patient tolerated infusion without incident.  Blood return noted pre and post infusion.  Site patent and intact, free from redness, edema or discomfort.  No evidence of extravasations.  Access discontinued per protocol.       Discharge Plan:   Discharge instructions reviewed with: Patient.  Patient and/or family verbalized understanding of discharge instructions and all questions answered.  AVS to patient via [employer].  Patient will return 3/24/22 for next appointment.   Patient discharged in stable condition accompanied by: self.  Departure Mode: Ambulatory.      Kendal Reyes RN

## 2022-02-24 NOTE — TELEPHONE ENCOUNTER
Oral Chemotherapy Monitoring Program     Placed call to June Ronquillo in follow up of lenalidomide (Revlimid) oral chemotherapy. This was for an initial teach.     Left a message requesting a call back. No drug names were mentioned in the voicemail. Will update when response is received.      Camila Carmichael, PharmD, BCACP  Oral Chemotherapy Monitoring Program  [facility]  [phone]  February 24, 2022

## 2022-02-24 NOTE — PATIENT INSTRUCTIONS
Contact Numbers  [facility]: [phone] (for symptom and scheduling needs)    Please call the [facility] Triage line if you experience a temperature greater than or equal to 100.4, shaking chills, have uncontrolled nausea, vomiting and/or diarrhea, dizziness, shortness of breath, chest pain, bleeding, unexplained bruising, or if you have any other new/concerning symptoms, questions or concerns.     If you are having any concerning symptoms or wish to speak to a provider before your next infusion visit, please call your care coordinator or triage to notify them so we can adequately serve you.     If you need a refill on a narcotic prescription or other medication, please call triage before your infusion appointment.

## 2022-02-25 ENCOUNTER — TELEPHONE (OUTPATIENT)
Dept: ONCOLOGY | Facility: CLINIC | Age: 52
End: 2022-02-25
Payer: COMMERCIAL

## 2022-02-25 DIAGNOSIS — C90.00 MULTIPLE MYELOMA NOT HAVING ACHIEVED REMISSION (H): Primary | ICD-10-CM

## 2022-02-25 LAB
ALBUMIN SERPL ELPH-MCNC: 4.7 G/DL (ref 3.7–5.1)
ALPHA1 GLOB SERPL ELPH-MCNC: 0.3 G/DL (ref 0.2–0.4)
ALPHA2 GLOB SERPL ELPH-MCNC: 0.6 G/DL (ref 0.5–0.9)
B-GLOBULIN SERPL ELPH-MCNC: 0.8 G/DL (ref 0.6–1)
GAMMA GLOB SERPL ELPH-MCNC: 0.3 G/DL (ref 0.7–1.6)
IGA SERPL-MCNC: 31 MG/DL (ref 84–499)
IGG SERPL-MCNC: 253 MG/DL (ref 610–1616)
IGM SERPL-MCNC: 11 MG/DL (ref 35–242)
KAPPA LC FREE SER-MCNC: 0.46 MG/DL (ref 0.33–1.94)
KAPPA LC FREE/LAMBDA FREE SER NEPH: 1.28 {RATIO} (ref 0.26–1.65)
LAMBDA LC FREE SERPL-MCNC: 0.36 MG/DL (ref 0.57–2.63)
M PROTEIN SERPL ELPH-MCNC: 0 G/DL
PROT PATTERN SERPL ELPH-IMP: ABNORMAL
PROT PATTERN SERPL IFE-IMP: NORMAL

## 2022-02-25 RX ORDER — LENALIDOMIDE 10 MG/1
10 CAPSULE ORAL DAILY
Qty: 28 CAPSULE | Refills: 0 | Status: SHIPPED | OUTPATIENT
Start: 2022-02-25 | End: 2022-04-04

## 2022-02-25 NOTE — TELEPHONE ENCOUNTER
Oral Chemotherapy Monitoring Program  Subjective/Objective:  June Ronquillo is a 51 year old male contacted by phone for an initial visit for oral chemotherapy education.      ORAL CHEMOTHERAPY 7/24/2021 8/21/2021 8/31/2021 9/20/2021 11/5/2021 2/24/2022 2/25/2022   Assessment Type Chart Review Chart Review Refill Refill Discontinuation Initial Work up New Teach   Stop Date - - - - 10/20/2021 - -   Reason for Discontinuation - - - - Disease progression - -   Diagnosis Code Multiple Myeloma Multiple Myeloma Multiple Myeloma Multiple Myeloma Multiple Myeloma Multiple Myeloma Multiple Myeloma   Providers Dr. George Vazquez   [facility]   Drug Name Revlimid (lenalidomide) Revlimid (lenalidomide) Revlimid (lenalidomide) Revlimid (lenalidomide) Revlimid (lenalidomide) Revlimid (lenalidomide) Revlimid (lenalidomide)   Dose 25 mg 25 mg 25 mg 25 mg 25 mg 10 mg 10 mg   Current Schedule Daily Daily Daily Daily Daily Daily Daily   Cycle Details 2 weeks on, 1 week off 2 weeks on, 1 week off 2 weeks on, 1 week off 2 weeks on, 1 week off 2 weeks on, 1 week off Continuous Continuous   Start Date of Last Cycle - - - - - - -   Planned next cycle start date - - - - - - -   Doses missed in last 2 weeks - - - - - - -   Adherence Assessment - - - - - - -   Adverse Effects - - - - - - -   Any new drug interactions? - - - - - No No   Is the dose as ordered appropriate for the patient? - - - - - Yes Yes   Since the last time we talked, have you been hospitalized or used the emergency room? - - - - - - -       Last PHQ-2 Score on record:   PHQ-2 ( 1999 [employer]) 11/29/2021 10/19/2021   Q1: Little interest or pleasure in doing things 0 0   Q2: Feeling down, depressed or hopeless 0 0   PHQ-2 Score 0 0   PHQ-2 Total Score (12-17 Years)- Positive if 3 or more points; Administer PHQ-A if positive 0 0       Vitals:  BP:  "  BP Readings from Last 1 Encounters:   02/24/22 134/83     Wt Readings from Last 1 Encounters:   02/24/22 74.8 kg (164 lb 14.4 oz)     Estimated body surface area is 1.85 meters squared as calculated from the following:    Height as of 1/25/22: 1.651 m (5' 5\").    Weight as of 2/24/22: 74.8 kg (164 lb 14.4 oz).    Labs:  _  Result Component Current Result Ref Range   Sodium 136 (2/24/2022) 133 - 144 mmol/L     _  Result Component Current Result Ref Range   Potassium 4.3 (2/24/2022) 3.4 - 5.3 mmol/L     _  Result Component Current Result Ref Range   Calcium 9.1 (2/24/2022) 8.5 - 10.1 mg/dL     No results found for Mag within last 30 days.     No results found for Phos within last 30 days.     _  Result Component Current Result Ref Range   Albumin 4.3 (2/24/2022) 3.4 - 5.0 g/dL     _  Result Component Current Result Ref Range   Urea Nitrogen 22 (2/24/2022) 7 - 30 mg/dL     _  Result Component Current Result Ref Range   Creatinine 1.09 (2/24/2022) 0.66 - 1.25 mg/dL     _  Result Component Current Result Ref Range   AST 22 (2/24/2022) 0 - 45 U/L     _  Result Component Current Result Ref Range   ALT 23 (2/24/2022) 0 - 70 U/L     _  Result Component Current Result Ref Range   Bilirubin Total 0.3 (2/24/2022) 0.2 - 1.3 mg/dL     _  Result Component Current Result Ref Range   WBC Count 7.5 (2/24/2022) 4.0 - 11.0 10e3/uL     _  Result Component Current Result Ref Range   Hemoglobin 13.0 (L) (2/24/2022) 13.3 - 17.7 g/dL     _  Result Component Current Result Ref Range   Platelet Count 375 (2/24/2022) 150 - 450 10e3/uL     No results found for ANC within last 30 days.       Assessment:  Patient is appropriate to start therapy.    Plan:  Basic chemotherapy teaching was reviewed with the patient including indication, start date of therapy, dose, administration, adverse effects, missed doses, food and drug interactions, monitoring, side effect management, office contact information, and safe handling. Written materials were " provided and all questions answered.    Follow-Up:  1 week following start.     Camila Carmichael, PharmD, BCACP  Oral Chemotherapy Monitoring Program  [facility]  [phone]  February 25, 2022

## 2022-02-25 NOTE — TELEPHONE ENCOUNTER
Oral Chemotherapy Monitoring Program   Medication: Revlimid  Rx: 10mg PO daily on days 1 through 28 of 28 day cycle   Auth #: [id number]   Risk Category: Adult Male  Routine survey questions reviewed.   Rx to be Escribed to [facility]    Yu Goldberg  [facility]  Oncology Pharmacy Liaison   [email or web address]  [phone] (phone)  [phone] (fax)

## 2022-02-25 NOTE — TELEPHONE ENCOUNTER
Oral Chemotherapy Monitoring Program     Placed call to June Ronquillo in follow up of lenalidomide (Revlimid) oral chemotherapy. This was for an initial teach.     Left a message requesting a call back. No drug names were mentioned in the voicemail. Will update when response is received.      Camila Carmichael, PharmD, BCACP  Oral Chemotherapy Monitoring Program  [facility]  [phone]  February 25, 2022

## 2022-03-01 ENCOUNTER — TELEPHONE (OUTPATIENT)
Dept: TRANSPLANT | Facility: CLINIC | Age: 52
End: 2022-03-01
Payer: COMMERCIAL

## 2022-03-01 NOTE — TELEPHONE ENCOUNTER
The patient is contacted regarding upcoming appointments for Blood and Marrow Transplant Anniversary visit (BAN).     For this visit the patient is scheduled for testing that requires (check all that apply): BAN Call Requiremets: Collect a 24 hour urine specimen    For 24hr urine collection:   Patient is instructed to obtain a 24 hour specimen collection container from their local laboratory and return the day of their testing appointments. If they are unable to obtain a specimen container they are informed to contact their BMT Nurse Coordinator to assist in order to ensure timely results for physician review visit    For anticoagulation hold:  N/A

## 2022-03-04 ENCOUNTER — LAB (OUTPATIENT)
Dept: LAB | Facility: CLINIC | Age: 52
End: 2022-03-04
Attending: STUDENT IN AN ORGANIZED HEALTH CARE EDUCATION/TRAINING PROGRAM
Payer: COMMERCIAL

## 2022-03-04 ENCOUNTER — ANESTHESIA EVENT (OUTPATIENT)
Dept: SURGERY | Facility: AMBULATORY SURGERY CENTER | Age: 52
End: 2022-03-04
Payer: COMMERCIAL

## 2022-03-04 DIAGNOSIS — Z11.59 ENCOUNTER FOR SCREENING FOR OTHER VIRAL DISEASES: ICD-10-CM

## 2022-03-04 LAB — SARS-COV-2 RNA RESP QL NAA+PROBE: NEGATIVE

## 2022-03-04 PROCEDURE — U0003 INFECTIOUS AGENT DETECTION BY NUCLEIC ACID (DNA OR RNA); SEVERE ACUTE RESPIRATORY SYNDROME CORONAVIRUS 2 (SARS-COV-2) (CORONAVIRUS DISEASE [COVID-19]), AMPLIFIED PROBE TECHNIQUE, MAKING USE OF HIGH THROUGHPUT TECHNOLOGIES AS DESCRIBED BY CMS-2020-01-R: HCPCS | Mod: 90 | Performed by: PATHOLOGY

## 2022-03-04 PROCEDURE — 99000 SPECIMEN HANDLING OFFICE-LAB: CPT | Performed by: PATHOLOGY

## 2022-03-04 PROCEDURE — U0005 INFEC AGEN DETEC AMPLI PROBE: HCPCS | Mod: 90 | Performed by: PATHOLOGY

## 2022-03-05 PROCEDURE — 84166 PROTEIN E-PHORESIS/URINE/CSF: CPT | Performed by: PATHOLOGY

## 2022-03-05 PROCEDURE — 83883 ASSAY NEPHELOMETRY NOT SPEC: CPT | Performed by: INTERNAL MEDICINE

## 2022-03-05 PROCEDURE — 81050 URINALYSIS VOLUME MEASURE: CPT | Performed by: PATHOLOGY

## 2022-03-05 PROCEDURE — 84166 PROTEIN E-PHORESIS/URINE/CSF: CPT | Mod: 26 | Performed by: PATHOLOGY

## 2022-03-05 PROCEDURE — 86335 IMMUNFIX E-PHORSIS/URINE/CSF: CPT | Mod: 26 | Performed by: PATHOLOGY

## 2022-03-05 PROCEDURE — 86335 IMMUNFIX E-PHORSIS/URINE/CSF: CPT | Performed by: PATHOLOGY

## 2022-03-07 ENCOUNTER — ANESTHESIA (OUTPATIENT)
Dept: SURGERY | Facility: AMBULATORY SURGERY CENTER | Age: 52
End: 2022-03-07
Payer: COMMERCIAL

## 2022-03-07 ENCOUNTER — APPOINTMENT (OUTPATIENT)
Dept: LAB | Facility: CLINIC | Age: 52
End: 2022-03-07
Attending: STUDENT IN AN ORGANIZED HEALTH CARE EDUCATION/TRAINING PROGRAM
Payer: COMMERCIAL

## 2022-03-07 ENCOUNTER — ONCOLOGY VISIT (OUTPATIENT)
Dept: TRANSPLANT | Facility: CLINIC | Age: 52
End: 2022-03-07
Attending: STUDENT IN AN ORGANIZED HEALTH CARE EDUCATION/TRAINING PROGRAM
Payer: COMMERCIAL

## 2022-03-07 ENCOUNTER — HOSPITAL ENCOUNTER (OUTPATIENT)
Facility: AMBULATORY SURGERY CENTER | Age: 52
End: 2022-03-07
Attending: STUDENT IN AN ORGANIZED HEALTH CARE EDUCATION/TRAINING PROGRAM
Payer: COMMERCIAL

## 2022-03-07 VITALS
OXYGEN SATURATION: 100 % | RESPIRATION RATE: 18 BRPM | WEIGHT: 170 LBS | TEMPERATURE: 98 F | BODY MASS INDEX: 27.32 KG/M2 | DIASTOLIC BLOOD PRESSURE: 75 MMHG | HEIGHT: 66 IN | HEART RATE: 81 BPM | SYSTOLIC BLOOD PRESSURE: 120 MMHG

## 2022-03-07 VITALS
BODY MASS INDEX: 27.36 KG/M2 | HEART RATE: 83 BPM | OXYGEN SATURATION: 100 % | TEMPERATURE: 97.9 F | DIASTOLIC BLOOD PRESSURE: 81 MMHG | SYSTOLIC BLOOD PRESSURE: 123 MMHG | WEIGHT: 164.4 LBS | RESPIRATION RATE: 16 BRPM

## 2022-03-07 DIAGNOSIS — C90.00 MULTIPLE MYELOMA NOT HAVING ACHIEVED REMISSION (H): Primary | ICD-10-CM

## 2022-03-07 DIAGNOSIS — C90.00 MULTIPLE MYELOMA NOT HAVING ACHIEVED REMISSION (H): ICD-10-CM

## 2022-03-07 LAB
ALBUMIN SERPL-MCNC: 3.8 G/DL (ref 3.4–5)
ALP SERPL-CCNC: 50 U/L (ref 40–150)
ALT SERPL W P-5'-P-CCNC: 23 U/L (ref 0–70)
ANION GAP SERPL CALCULATED.3IONS-SCNC: 10 MMOL/L (ref 3–14)
AST SERPL W P-5'-P-CCNC: 16 U/L (ref 0–45)
BASOPHILS # BLD AUTO: 0 10E3/UL (ref 0–0.2)
BASOPHILS # BLD AUTO: 0 10E3/UL (ref 0–0.2)
BASOPHILS NFR BLD AUTO: 0 %
BASOPHILS NFR BLD AUTO: 0 %
BILIRUB SERPL-MCNC: 0.3 MG/DL (ref 0.2–1.3)
BUN SERPL-MCNC: 17 MG/DL (ref 7–30)
CALCIUM SERPL-MCNC: 8.8 MG/DL (ref 8.5–10.1)
CD19 CELLS # BLD: 134 CELLS/UL (ref 107–698)
CD19 CELLS NFR BLD: 6 % (ref 6–27)
CD3 CELLS # BLD: 2123 CELLS/UL (ref 603–2990)
CD3 CELLS NFR BLD: 89 % (ref 49–84)
CD3+CD4+ CELLS # BLD: 565 CELLS/UL (ref 441–2156)
CD3+CD4+ CELLS NFR BLD: 24 % (ref 28–63)
CD3+CD4+ CELLS/CD3+CD8+ CLL BLD: 0.38 % (ref 1.4–2.6)
CD3+CD8+ CELLS # BLD: 1485 CELLS/UL (ref 125–1312)
CD3+CD8+ CELLS NFR BLD: 62 % (ref 10–40)
CD3-CD16+CD56+ CELLS # BLD: 138 CELLS/UL (ref 95–640)
CD3-CD16+CD56+ CELLS NFR BLD: 6 % (ref 4–25)
CHLORIDE BLD-SCNC: 109 MMOL/L (ref 94–109)
CO2 SERPL-SCNC: 22 MMOL/L (ref 20–32)
CREAT SERPL-MCNC: 0.95 MG/DL (ref 0.66–1.25)
EOSINOPHIL # BLD AUTO: 0.4 10E3/UL (ref 0–0.7)
EOSINOPHIL # BLD AUTO: 0.4 10E3/UL (ref 0–0.7)
EOSINOPHIL NFR BLD AUTO: 5 %
EOSINOPHIL NFR BLD AUTO: 5 %
ERYTHROCYTE [DISTWIDTH] IN BLOOD BY AUTOMATED COUNT: 13.6 % (ref 10–15)
ERYTHROCYTE [DISTWIDTH] IN BLOOD BY AUTOMATED COUNT: 13.6 % (ref 10–15)
GFR SERPL CREATININE-BSD FRML MDRD: >90 ML/MIN/1.73M2
GLUCOSE BLD-MCNC: 106 MG/DL (ref 70–99)
HCT VFR BLD AUTO: 36.5 % (ref 40–53)
HCT VFR BLD AUTO: 37 % (ref 40–53)
HGB BLD-MCNC: 12.2 G/DL (ref 13.3–17.7)
HGB BLD-MCNC: 12.4 G/DL (ref 13.3–17.7)
IGA SERPL-MCNC: 37 MG/DL (ref 84–499)
IGG SERPL-MCNC: 243 MG/DL (ref 610–1616)
IGM SERPL-MCNC: 12 MG/DL (ref 35–242)
IMM GRANULOCYTES # BLD: 0 10E3/UL
IMM GRANULOCYTES # BLD: 0 10E3/UL
IMM GRANULOCYTES NFR BLD: 0 %
IMM GRANULOCYTES NFR BLD: 0 %
INR PPP: 0.96 (ref 0.85–1.15)
KAPPA LC FREE SER-MCNC: 0.6 MG/DL (ref 0.33–1.94)
KAPPA LC FREE/LAMBDA FREE SER NEPH: 0.97 {RATIO} (ref 0.26–1.65)
LAMBDA LC FREE SERPL-MCNC: 0.62 MG/DL (ref 0.57–2.63)
LDH SERPL L TO P-CCNC: 63 U/L (ref 85–227)
LYMPHOCYTES # BLD AUTO: 2.4 10E3/UL (ref 0.8–5.3)
LYMPHOCYTES # BLD AUTO: 2.4 10E3/UL (ref 0.8–5.3)
LYMPHOCYTES NFR BLD AUTO: 31 %
LYMPHOCYTES NFR BLD AUTO: 32 %
MAGNESIUM SERPL-MCNC: 1.9 MG/DL (ref 1.6–2.3)
MCH RBC QN AUTO: 30.9 PG (ref 26.5–33)
MCH RBC QN AUTO: 31 PG (ref 26.5–33)
MCHC RBC AUTO-ENTMCNC: 33.4 G/DL (ref 31.5–36.5)
MCHC RBC AUTO-ENTMCNC: 33.5 G/DL (ref 31.5–36.5)
MCV RBC AUTO: 92 FL (ref 78–100)
MCV RBC AUTO: 93 FL (ref 78–100)
MONOCYTES # BLD AUTO: 0.9 10E3/UL (ref 0–1.3)
MONOCYTES # BLD AUTO: 0.9 10E3/UL (ref 0–1.3)
MONOCYTES NFR BLD AUTO: 11 %
MONOCYTES NFR BLD AUTO: 11 %
NEUTROPHILS # BLD AUTO: 3.9 10E3/UL (ref 1.6–8.3)
NEUTROPHILS # BLD AUTO: 4 10E3/UL (ref 1.6–8.3)
NEUTROPHILS NFR BLD AUTO: 52 %
NEUTROPHILS NFR BLD AUTO: 53 %
NRBC # BLD AUTO: 0 10E3/UL
NRBC # BLD AUTO: 0 10E3/UL
NRBC BLD AUTO-RTO: 0 /100
NRBC BLD AUTO-RTO: 0 /100
PHOSPHATE SERPL-MCNC: 3.2 MG/DL (ref 2.5–4.5)
PLATELET # BLD AUTO: 349 10E3/UL (ref 150–450)
PLATELET # BLD AUTO: 350 10E3/UL (ref 150–450)
POTASSIUM BLD-SCNC: 3.6 MMOL/L (ref 3.4–5.3)
PROT SERPL-MCNC: 6.2 G/DL (ref 6.8–8.8)
RBC # BLD AUTO: 3.93 10E6/UL (ref 4.4–5.9)
RBC # BLD AUTO: 4.01 10E6/UL (ref 4.4–5.9)
SODIUM SERPL-SCNC: 141 MMOL/L (ref 133–144)
T CELL EXTENDED COMMENT: ABNORMAL
TOTAL PROTEIN SERUM FOR ELP: 6.2 G/DL (ref 6.8–8.8)
URATE SERPL-MCNC: 2.9 MG/DL (ref 3.5–7.2)
WBC # BLD AUTO: 7.6 10E3/UL (ref 4–11)
WBC # BLD AUTO: 7.7 10E3/UL (ref 4–11)

## 2022-03-07 PROCEDURE — 88188 FLOWCYTOMETRY/READ 9-15: CPT | Mod: GC | Performed by: PATHOLOGY

## 2022-03-07 PROCEDURE — 38221 DX BONE MARROW BIOPSIES: CPT | Performed by: STUDENT IN AN ORGANIZED HEALTH CARE EDUCATION/TRAINING PROGRAM

## 2022-03-07 PROCEDURE — 88305 TISSUE EXAM BY PATHOLOGIST: CPT | Mod: 26 | Performed by: PATHOLOGY

## 2022-03-07 PROCEDURE — 88305 TISSUE EXAM BY PATHOLOGIST: CPT | Mod: TC | Performed by: PHYSICIAN ASSISTANT

## 2022-03-07 PROCEDURE — 88264 CHROMOSOME ANALYSIS 20-25: CPT | Performed by: PHYSICIAN ASSISTANT

## 2022-03-07 PROCEDURE — 88237 TISSUE CULTURE BONE MARROW: CPT | Performed by: PHYSICIAN ASSISTANT

## 2022-03-07 PROCEDURE — 84165 PROTEIN E-PHORESIS SERUM: CPT | Mod: TC | Performed by: PATHOLOGY

## 2022-03-07 PROCEDURE — 86355 B CELLS TOTAL COUNT: CPT | Mod: XU | Performed by: INTERNAL MEDICINE

## 2022-03-07 PROCEDURE — 83521 IG LIGHT CHAINS FREE EACH: CPT | Performed by: PHYSICIAN ASSISTANT

## 2022-03-07 PROCEDURE — 38222 DX BONE MARROW BX & ASPIR: CPT

## 2022-03-07 PROCEDURE — 38222 DX BONE MARROW BX & ASPIR: CPT | Mod: RT

## 2022-03-07 PROCEDURE — 83615 LACTATE (LD) (LDH) ENZYME: CPT | Performed by: INTERNAL MEDICINE

## 2022-03-07 PROCEDURE — 88341 IMHCHEM/IMCYTCHM EA ADD ANTB: CPT | Mod: 26 | Performed by: PATHOLOGY

## 2022-03-07 PROCEDURE — 88311 DECALCIFY TISSUE: CPT | Mod: 26 | Performed by: PATHOLOGY

## 2022-03-07 PROCEDURE — 85018 HEMOGLOBIN: CPT

## 2022-03-07 PROCEDURE — 85025 COMPLETE CBC W/AUTO DIFF WBC: CPT | Performed by: INTERNAL MEDICINE

## 2022-03-07 PROCEDURE — 80053 COMPREHEN METABOLIC PANEL: CPT | Performed by: INTERNAL MEDICINE

## 2022-03-07 PROCEDURE — 99207 PR SATISFY VISIT NUMBER: CPT | Performed by: STUDENT IN AN ORGANIZED HEALTH CARE EDUCATION/TRAINING PROGRAM

## 2022-03-07 PROCEDURE — 84155 ASSAY OF PROTEIN SERUM: CPT | Performed by: PHYSICIAN ASSISTANT

## 2022-03-07 PROCEDURE — 36415 COLL VENOUS BLD VENIPUNCTURE: CPT | Performed by: PHYSICIAN ASSISTANT

## 2022-03-07 PROCEDURE — 85097 BONE MARROW INTERPRETATION: CPT | Performed by: PATHOLOGY

## 2022-03-07 PROCEDURE — 85610 PROTHROMBIN TIME: CPT

## 2022-03-07 PROCEDURE — 86334 IMMUNOFIX E-PHORESIS SERUM: CPT | Mod: 26 | Performed by: PATHOLOGY

## 2022-03-07 PROCEDURE — 82784 ASSAY IGA/IGD/IGG/IGM EACH: CPT | Performed by: PHYSICIAN ASSISTANT

## 2022-03-07 PROCEDURE — 83735 ASSAY OF MAGNESIUM: CPT | Performed by: INTERNAL MEDICINE

## 2022-03-07 PROCEDURE — 84550 ASSAY OF BLOOD/URIC ACID: CPT | Performed by: INTERNAL MEDICINE

## 2022-03-07 PROCEDURE — 88291 CYTO/MOLECULAR REPORT: CPT | Performed by: MEDICAL GENETICS

## 2022-03-07 PROCEDURE — 84100 ASSAY OF PHOSPHORUS: CPT | Performed by: INTERNAL MEDICINE

## 2022-03-07 PROCEDURE — 88342 IMHCHEM/IMCYTCHM 1ST ANTB: CPT | Mod: 26 | Performed by: PATHOLOGY

## 2022-03-07 PROCEDURE — 88271 CYTOGENETICS DNA PROBE: CPT | Performed by: PHYSICIAN ASSISTANT

## 2022-03-07 PROCEDURE — 86334 IMMUNOFIX E-PHORESIS SERUM: CPT | Performed by: PATHOLOGY

## 2022-03-07 PROCEDURE — G0463 HOSPITAL OUTPT CLINIC VISIT: HCPCS

## 2022-03-07 PROCEDURE — 88185 FLOWCYTOMETRY/TC ADD-ON: CPT | Performed by: PHYSICIAN ASSISTANT

## 2022-03-07 PROCEDURE — 85060 BLOOD SMEAR INTERPRETATION: CPT | Performed by: PATHOLOGY

## 2022-03-07 PROCEDURE — 84165 PROTEIN E-PHORESIS SERUM: CPT | Mod: 26 | Performed by: PATHOLOGY

## 2022-03-07 PROCEDURE — 88184 FLOWCYTOMETRY/ TC 1 MARKER: CPT | Performed by: PHYSICIAN ASSISTANT

## 2022-03-07 PROCEDURE — 88184 FLOWCYTOMETRY/ TC 1 MARKER: CPT | Performed by: PATHOLOGY

## 2022-03-07 RX ORDER — POTASSIUM CHLORIDE 750 MG/1
20 TABLET, EXTENDED RELEASE ORAL DAILY
Qty: 60 TABLET | Refills: 0 | Status: SHIPPED | OUTPATIENT
Start: 2022-03-07 | End: 2022-04-12

## 2022-03-07 RX ORDER — FENTANYL CITRATE 50 UG/ML
25 INJECTION, SOLUTION INTRAMUSCULAR; INTRAVENOUS EVERY 5 MIN PRN
Status: DISCONTINUED | OUTPATIENT
Start: 2022-03-07 | End: 2022-03-08 | Stop reason: HOSPADM

## 2022-03-07 RX ORDER — LIDOCAINE HYDROCHLORIDE 10 MG/ML
8-10 INJECTION, SOLUTION EPIDURAL; INFILTRATION; INTRACAUDAL; PERINEURAL
Status: CANCELLED | OUTPATIENT
Start: 2022-03-07

## 2022-03-07 RX ORDER — LIDOCAINE 40 MG/G
CREAM TOPICAL
Status: DISCONTINUED | OUTPATIENT
Start: 2022-03-07 | End: 2022-03-08 | Stop reason: HOSPADM

## 2022-03-07 RX ORDER — ACETAMINOPHEN 325 MG/1
975 TABLET ORAL ONCE
Status: COMPLETED | OUTPATIENT
Start: 2022-03-07 | End: 2022-03-07

## 2022-03-07 RX ORDER — PROPOFOL 10 MG/ML
INJECTION, EMULSION INTRAVENOUS PRN
Status: DISCONTINUED | OUTPATIENT
Start: 2022-03-07 | End: 2022-03-07

## 2022-03-07 RX ORDER — LIDOCAINE 40 MG/G
CREAM TOPICAL
Status: CANCELLED | OUTPATIENT
Start: 2022-03-07

## 2022-03-07 RX ORDER — HYDROMORPHONE HYDROCHLORIDE 1 MG/ML
0.2 INJECTION, SOLUTION INTRAMUSCULAR; INTRAVENOUS; SUBCUTANEOUS EVERY 5 MIN PRN
Status: DISCONTINUED | OUTPATIENT
Start: 2022-03-07 | End: 2022-03-08 | Stop reason: HOSPADM

## 2022-03-07 RX ORDER — PROPOFOL 10 MG/ML
INJECTION, EMULSION INTRAVENOUS CONTINUOUS PRN
Status: DISCONTINUED | OUTPATIENT
Start: 2022-03-07 | End: 2022-03-07

## 2022-03-07 RX ORDER — OXYCODONE HYDROCHLORIDE 5 MG/1
5 TABLET ORAL EVERY 4 HOURS PRN
Status: DISCONTINUED | OUTPATIENT
Start: 2022-03-07 | End: 2022-03-08 | Stop reason: HOSPADM

## 2022-03-07 RX ORDER — FENTANYL CITRATE 50 UG/ML
25 INJECTION, SOLUTION INTRAMUSCULAR; INTRAVENOUS
Status: DISCONTINUED | OUTPATIENT
Start: 2022-03-07 | End: 2022-03-08 | Stop reason: HOSPADM

## 2022-03-07 RX ORDER — SODIUM CHLORIDE, SODIUM LACTATE, POTASSIUM CHLORIDE, CALCIUM CHLORIDE 600; 310; 30; 20 MG/100ML; MG/100ML; MG/100ML; MG/100ML
INJECTION, SOLUTION INTRAVENOUS CONTINUOUS
Status: DISCONTINUED | OUTPATIENT
Start: 2022-03-07 | End: 2022-03-08 | Stop reason: HOSPADM

## 2022-03-07 RX ORDER — ONDANSETRON 4 MG/1
4 TABLET, ORALLY DISINTEGRATING ORAL EVERY 30 MIN PRN
Status: DISCONTINUED | OUTPATIENT
Start: 2022-03-07 | End: 2022-03-08 | Stop reason: HOSPADM

## 2022-03-07 RX ORDER — ONDANSETRON 2 MG/ML
4 INJECTION INTRAMUSCULAR; INTRAVENOUS EVERY 30 MIN PRN
Status: DISCONTINUED | OUTPATIENT
Start: 2022-03-07 | End: 2022-03-08 | Stop reason: HOSPADM

## 2022-03-07 RX ORDER — MEPERIDINE HYDROCHLORIDE 25 MG/ML
12.5 INJECTION INTRAMUSCULAR; INTRAVENOUS; SUBCUTANEOUS
Status: DISCONTINUED | OUTPATIENT
Start: 2022-03-07 | End: 2022-03-08 | Stop reason: HOSPADM

## 2022-03-07 RX ADMIN — PROPOFOL 50 MG: 10 INJECTION, EMULSION INTRAVENOUS at 11:06

## 2022-03-07 RX ADMIN — PROPOFOL 200 MCG/KG/MIN: 10 INJECTION, EMULSION INTRAVENOUS at 11:09

## 2022-03-07 RX ADMIN — SODIUM CHLORIDE, SODIUM LACTATE, POTASSIUM CHLORIDE, CALCIUM CHLORIDE: 600; 310; 30; 20 INJECTION, SOLUTION INTRAVENOUS at 11:05

## 2022-03-07 RX ADMIN — ACETAMINOPHEN 975 MG: 325 TABLET ORAL at 09:44

## 2022-03-07 ASSESSMENT — ENCOUNTER SYMPTOMS
SEIZURES: 0
DYSRHYTHMIAS: 0

## 2022-03-07 ASSESSMENT — LIFESTYLE VARIABLES: TOBACCO_USE: 0

## 2022-03-07 ASSESSMENT — PAIN SCALES - GENERAL: PAINLEVEL: NO PAIN (0)

## 2022-03-07 NOTE — DISCHARGE INSTRUCTIONS
How to Care for your Bone Marrow Biopsy    Activity  Relax and take it easy for the next 24 hours.   Resume regular activity after 24 hours.    Diet   Resume pre-procedure diet and drink plenty of fluids.    If you received sedation, you may feel a little nauseated so start with a clear liquid diet until the nausea passes.    Do Not Immerse Bone Marrow Biopsy Puncture Site in Water  Do not take a bath until the puncture site has healed.  Do not sit in a hot tub or spa until the puncture site has healed.  Do not swim until the puncture site has healed.  Wait 24 hours before taking a shower.    Drainage  Drainage should be minimal.  IF bleeding should occur and soaks through the dressing, lie down and put pressure on the puncture site.    IF bleeding persists, apply gentle pressure with your hand over the dressing for 5 minutes.    IF the pressure doesn't stop the bleeding, contact your provider immediately.    Dressing  Keep the dressing dry and in place for 24 hours, unless instructed otherwise.    IF bleeding soaks through the dressing in the first 24 hours do NOT remove the dressing as you may pull off any scab that has formed.  Instead, reinforce the dressing with extra gauze and tape.    No Alcohol  Do not drink alcoholic beverages for the next 24 hours.    No Driving or Operating Machinery  No driving or operating machinery for the next 24 hours.    Notify your provider IF:    Excessive bleeding or drainage at the puncture site    Excessive swelling, redness or tenderness at the puncture site    Fever above 100.5 degrees taken orally    Severe pain    Drainage that is green, yellow, thick white or has a bad odor    Telephone Numbers  Bone Marrow transplant clinic:  [phone] (Monday thru Friday, 8:00 am to 4:00 pm)  After business hours call the [facility]:  [phone] and ask for the Hematology/BMT doctor on call.  Or call the Emergency Room at the [facility]:  [phone].

## 2022-03-07 NOTE — NURSING NOTE
"Oncology Rooming Note    March 7, 2022 8:49 AM   June Ronquillo is a 51 year old male who presents for:    Chief Complaint   Patient presents with     Blood Draw     labs drawn with piv start by rn.  vs taken     Oncology Clinic Visit     Multiple myeloma not having achieved remission (H) (Primary Dx)      Initial Vitals: /81 (BP Location: Right arm, Patient Position: Sitting, Cuff Size: Adult Large)   Pulse 83   Temp 97.9  F (36.6  C) (Oral)   Resp 16   Wt 74.6 kg (164 lb 6.4 oz)   SpO2 100%   BMI 27.36 kg/m   Estimated body mass index is 27.36 kg/m  as calculated from the following:    Height as of 1/25/22: 1.651 m (5' 5\").    Weight as of this encounter: 74.6 kg (164 lb 6.4 oz). Body surface area is 1.85 meters squared.  No Pain (0) Comment: Data Unavailable   No LMP for male patient.  Allergies reviewed: Yes  Medications reviewed: Yes    Medications: MEDICATION REFILLS NEEDED TODAY. Provider was notified. REFILL needed on potassium.   Pharmacy name entered into [facility]: [facility] [place], [place] - [address]    Clinical concerns: None.       Eleanor Gil CMA            "

## 2022-03-07 NOTE — PROGRESS NOTES
BMT ONC Adult Bone Marrow Biopsy Procedure Note  March 7, 2022   VS reviewed    Learning needs assessment complete within 12 months? YES    DIAGNOSIS: multiple myeloma     PROCEDURE: Unilateral Bone Marrow Biopsy and Unilateral Aspirate    LOCATION: [facility] 5th floor-Procedure Room    Patient s identification was positively verified by verbal identification and invasive procedure safety checklist was completed. Informed consent was obtained. Following the administration of Propofol as pre-medication, patient was placed in the prone position and prepped and draped in a sterile manner. Approximately 10 cc of 1% Lidocaine was used over the right posterior iliac spine. Following this a 3 mm incision was made. Trephine bone marrow core(s) was (were) obtained from the [facility]. Bone marrow aspirates were obtained from the [facility]. Aspirates were sent for morphology, immunophenotyping, cytogenetics and molecular diagnostics RFLP. A total of approximately 20 ml of marrow was aspirated. Following this procedure a sterile dressing was applied to the bone marrow biopsy site(s). The patient was placed in the supine position to maintain pressure on the biopsy site. Post-procedure wound care instructions were given.     Complications: NO     Pre-procedural pain: 0 out of 10 on the numeric pain rating scale.     Procedural pain: unable to assess, patient well sedated  Post-procedural pain assessment: 0 out of 10 on the numeric pain rating scale.     Interventions: NO    Length of procedure:20 minutes or less      Procedure performed by: Deborah Carmona PAC  [phone]

## 2022-03-07 NOTE — ANESTHESIA PREPROCEDURE EVALUATION
Anesthesia Pre-Procedure Evaluation    Patient: June Ronquillo   MRN: [id number] : 1970        Procedure : Procedure(s):  BIOPSY, BONE MARROW          Past Medical History:   Diagnosis Date     CAD (coronary artery disease)     s/p stent to CFX     Heart attack (H)      Hyperlipidemia LDL goal <100      Hypertension      Stented coronary artery      Thyroid disease       Past Surgical History:   Procedure Laterality Date     BONE MARROW BIOPSY, BONE SPECIMEN, NEEDLE/TROCAR Left 2021    Procedure: BIOPSY, BONE MARROW with aspiration and ancillary studies;  Surgeon: Niharika Regalado MD;  Location: [facility] OR     BONE MARROW BIOPSY, BONE SPECIMEN, NEEDLE/TROCAR Left 10/14/2021    Procedure: BIOPSY, BONE MARROW;  Surgeon: Alexa Albert APRN CNP;  Location: [facility] OR     HEART CATH PTCA SINGLE VESSEL  10/10/2004    Stent to CFX - [facility]      INSERT CATHETER VASCULAR ACCESS Right 11/3/2021    Procedure: NON VALVED TUNNELED DUAL LUMEN APHARESIS CAPABLE LINE INSERTION, VASCULAR ACCESS CATHETER;  Surgeon: Werner Mcnamara MD;  Location: [facility] OR     IR CVC TUNNEL PLACEMENT > 5 YRS OF AGE  11/3/2021     IR CVC TUNNEL REMOVAL RIGHT  2021      Allergies   Allergen Reactions     Blood Transfusion Related (Informational Only) Other (See Comments)     Patient has a history of a clinically significant antibody against RBC antigens.  A delay in compatible RBCs may occur.       Social History     Tobacco Use     Smoking status: Never Smoker     Smokeless tobacco: Never Used   Substance Use Topics     Alcohol use: Not Currently      Wt Readings from Last 1 Encounters:   22 77.1 kg (170 lb)        Anesthesia Evaluation   Pt has had prior anesthetic. Type: General.    No history of anesthetic complications       ROS/MED HX  ENT/Pulmonary:    (-) tobacco use, asthma and sleep apnea   Neurologic:    (-) no seizures and no CVA   Cardiovascular:     (+) hypertension--CAD --stent (prior  stent to CFX)-Drug Eluting Stent.  (-) taking anticoagulants/antiplatelets, arrhythmias and murmur   METS/Exercise Tolerance: >4 METS    Hematologic:    (-) anemia   Musculoskeletal:   (+) fracture, Fractures: Previous rib fractures prompting eval for MM, but no current fractures.     GI/Hepatic:    (-) GERD and liver disease   Renal/Genitourinary:    (-) renal disease   Endo:     (+) thyroid problem, hypothyroidism,     Psychiatric/Substance Use:    (-) psychiatric history   Infectious Disease:       Malignancy:   (+) Malignancy (Multiple myeloma, normal calcium, prior fractures prompted eval), History of Lymphoma/Leukemia.Lymph CA Active status post.        Other:      (+) , no H/O Chronic Pain,        Physical Exam    Airway        Mallampati: II   TM distance: > 3 FB   Neck ROM: full   Mouth opening: > 3 cm    Respiratory Devices and Support         Dental  no notable dental history         Cardiovascular   cardiovascular exam normal      (-) no murmur    Pulmonary   pulmonary exam normal                OUTSIDE LABS:  CBC:   Lab Results   Component Value Date    WBC 7.7 03/07/2022    WBC 7.6 03/07/2022    HGB 12.4 (L) 03/07/2022    HGB 12.2 (L) 03/07/2022    HCT 37.0 (L) 03/07/2022    HCT 36.5 (L) 03/07/2022     03/07/2022     03/07/2022     BMP:   Lab Results   Component Value Date     03/07/2022     02/24/2022    POTASSIUM 3.6 03/07/2022    POTASSIUM 4.3 02/24/2022    CHLORIDE 109 03/07/2022    CHLORIDE 107 02/24/2022    CO2 22 03/07/2022    CO2 21 02/24/2022    BUN 17 03/07/2022    BUN 22 02/24/2022    CR 0.95 03/07/2022    CR 1.09 02/24/2022     (H) 03/07/2022     (H) 02/24/2022     COAGS:   Lab Results   Component Value Date    PTT 41 (H) 11/22/2021    INR 0.96 03/07/2022    FIBR 485 11/21/2021     POC: No results found for: BGM, HCG, HCGS  HEPATIC:   Lab Results   Component Value Date    ALBUMIN 3.8 03/07/2022    PROTTOTAL 6.2 (L) 03/07/2022    ALT 23 03/07/2022     AST 16 03/07/2022    ALKPHOS 50 03/07/2022    BILITOTAL 0.3 03/07/2022     OTHER:   Lab Results   Component Value Date    LACT 1.9 11/22/2021    A1C 6.0 (H) 01/25/2021    HARDEEP 8.8 03/07/2022    PHOS 3.2 03/07/2022    MAG 1.9 03/07/2022    TSH 1.55 11/14/2021    T4 0.97 10/20/2021    CRP <2.9 04/30/2021       Anesthesia Plan    ASA Status:  3   NPO Status:  NPO Appropriate    Anesthesia Type: MAC.     - Reason for MAC: straight local not clinically adequate   Induction: Intravenous, Propofol.   Maintenance: TIVA.        Consents    Anesthesia Plan(s) and associated risks, benefits, and realistic alternatives discussed. Questions answered and patient/representative(s) expressed understanding.    - Discussed:     - Discussed with:  Patient      - Extended Intubation/Ventilatory Support Discussed: No.      - Patient is DNR/DNI Status: No    Use of blood products discussed: No .     Postoperative Care    Pain management: IV analgesics, Oral pain medications, Multi-modal analgesia.   PONV prophylaxis: Dexamethasone or Solumedrol, Ondansetron (or other 5HT-3), Background Propofol Infusion     Comments:                Chinmay Gordon MD

## 2022-03-07 NOTE — LETTER
3/7/2022     RE: June Ronquillo  [address]  [facility] [address]    Dear Colleague,    Thank you for referring your patient, June Ronquillo, to the [facility] BLOOD AND MARROW TRANSPLANT PROGRAM [place]. Please see a copy of my visit note below.    BMT ONC Adult Bone Marrow Biopsy Procedure Note  March 7, 2022   VS reviewed    Learning needs assessment complete within 12 months? YES    DIAGNOSIS: multiple myeloma     PROCEDURE: Unilateral Bone Marrow Biopsy and Unilateral Aspirate    LOCATION: [place] 5th floor-Procedure Room    Patient s identification was positively verified by verbal identification and invasive procedure safety checklist was completed. Informed consent was obtained. Following the administration of Propofol as pre-medication, patient was placed in the prone position and prepped and draped in a sterile manner. Approximately 10 cc of 1% Lidocaine was used over the right posterior iliac spine. Following this a 3 mm incision was made. Trephine bone marrow core(s) was (were) obtained from the [facility]. Bone marrow aspirates were obtained from the [facility]. Aspirates were sent for morphology, immunophenotyping, cytogenetics and molecular diagnostics RFLP. A total of approximately 20 ml of marrow was aspirated. Following this procedure a sterile dressing was applied to the bone marrow biopsy site(s). The patient was placed in the supine position to maintain pressure on the biopsy site. Post-procedure wound care instructions were given.     Complications: NO     Pre-procedural pain: 0 out of 10 on the numeric pain rating scale.     Procedural pain: unable to assess, patient well sedated  Post-procedural pain assessment: 0 out of 10 on the numeric pain rating scale.     Interventions: NO    Length of procedure:20 minutes or less    Procedure performed by: Deborah Carmona PAC  [phone]

## 2022-03-07 NOTE — H&P (VIEW-ONLY)
Patient Name: June Ronquillo  Patient MRN: [id number]  Patient : 1970    Abbreviated H&P and Pre-sedation Assessment for bone marrow biopsy (procedure name) with sedation    Chief complaint and/or reason for Procedure: multiple myeloma    Planned level of sedation: General anesthesia    History of problems with sedation: (patient or family hx): No    ASA Assessment Category: 2 - Mild systemic disease    History of sleep apnea: sleep study negative however pt feels he has sleep apnea     History of blood thinners: No     Appropriate NPO status: Yes, last food 8pm 3/6, small sip of water at 7am    Current tobacco use: No    Any recent fever, cough, chest or sinus congestion, SOB, weight loss, chest pain, diarrhea or constipation. No    Medications   Currently Scheduled Medications       Home Med List)  (Not in a hospital admission)      Allergies  Blood transfusion related (informational only)    PMH:  Past Medical History:   Diagnosis Date     CAD (coronary artery disease)     s/p stent to CFX     Heart attack (H)      Hyperlipidemia LDL goal <100      Hypertension      Stented coronary artery      Thyroid disease        Past Surgical History:   Procedure Laterality Date     BONE MARROW BIOPSY, BONE SPECIMEN, NEEDLE/TROCAR Left 2021    Procedure: BIOPSY, BONE MARROW with aspiration and ancillary studies;  Surgeon: Niharika Regalado MD;  Location: [facility] OR     BONE MARROW BIOPSY, BONE SPECIMEN, NEEDLE/TROCAR Left 10/14/2021    Procedure: BIOPSY, BONE MARROW;  Surgeon: Alexa Albert APRN CNP;  Location: [facility] OR     HEART CATH PTCA SINGLE VESSEL  10/10/2004    Stent to [facility] LIFX      INSERT CATHETER VASCULAR ACCESS Right 11/3/2021    Procedure: NON VALVED TUNNELED DUAL LUMEN APHARESIS CAPABLE LINE INSERTION, VASCULAR ACCESS CATHETER;  Surgeon: Werner Mcnamara MD;  Location: [facility] OR     IR CVC TUNNEL PLACEMENT > 5 YRS OF AGE  11/3/2021     IR CVC TUNNEL REMOVAL RIGHT   12/2/2021       Focused Physical exam pertinent to procedure:          (Details of heart, lung, ASA assessment and mallampati assessment in pre procedure assessment flowsheet)  General- healthy,alert,no distress   Recent vital signs-  /81 (BP Location: Right arm, Patient Position: Sitting, Cuff Size: Adult Large)   Pulse 83   Temp 97.9  F (36.6  C) (Oral)   Resp 16   Wt 74.6 kg (164 lb 6.4 oz)   SpO2 100%   BMI 27.36 kg/m    HEART-regular rate and rhythm and no murmurs, gallops, or rub  LUNGS-Clear to Ausculation  OROPHARYNGEAL - MALLAMPATTI- Class II (visualization of the soft palate, fauces, and uvula)    A/P:Reviewed history, medications, allergies, clinical information and pre procedure assessment. The patient was informed of the risks and benefits of the procedure.  They would like to proceed.  June Ronquillo is approved for use of sedation during their procedure as noted above.    JUANITA JamaC

## 2022-03-07 NOTE — LETTER
3/7/2022     RE: June Ronquillo  [address] [address]    Dear Colleague,    Thank you for referring your patient, June Ronquillo, to the [facility] BLOOD AND MARROW TRANSPLANT PROGRAM [place]. Please see a copy of my visit note below.    Patient Name: June Ronquillo  Patient MRN: [id number]  Patient : 1970    Abbreviated H&P and Pre-sedation Assessment for bone marrow biopsy (procedure name) with sedation    Chief complaint and/or reason for Procedure: multiple myeloma    Planned level of sedation: General anesthesia    History of problems with sedation: (patient or family hx): No    ASA Assessment Category: 2 - Mild systemic disease    History of sleep apnea: sleep study negative however pt feels he has sleep apnea     History of blood thinners: No     Appropriate NPO status: Yes, last food 8pm 3/6, small sip of water at 7am    Current tobacco use: No    Any recent fever, cough, chest or sinus congestion, SOB, weight loss, chest pain, diarrhea or constipation. No    Medications   Currently Scheduled Medications       Home Med List)  (Not in a hospital admission)      Allergies  Blood transfusion related (informational only)    PMH:  Past Medical History:   Diagnosis Date     CAD (coronary artery disease)     s/p stent to CFX     Heart attack (H)      Hyperlipidemia LDL goal <100      Hypertension      Stented coronary artery      Thyroid disease        Past Surgical History:   Procedure Laterality Date     BONE MARROW BIOPSY, BONE SPECIMEN, NEEDLE/TROCAR Left 2021    Procedure: BIOPSY, BONE MARROW with aspiration and ancillary studies;  Surgeon: Niharika Regalado MD;  Location: [facility] OR     BONE MARROW BIOPSY, BONE SPECIMEN, NEEDLE/TROCAR Left 10/14/2021    Procedure: BIOPSY, BONE MARROW;  Surgeon: Alexa Albert APRN CNP;  [place] [facility] [place]     HEART CATH PTCA SINGLE VESSEL  10/10/2004    Stent to [facility]      INSERT CATHETER  VASCULAR ACCESS Right 11/3/2021    Procedure: NON VALVED TUNNELED DUAL LUMEN APHARESIS CAPABLE LINE INSERTION, VASCULAR ACCESS CATHETER;  Surgeon: Werner Mcnamara MD;  Location: [facility] OR     IR CVC TUNNEL PLACEMENT > 5 YRS OF AGE  11/3/2021     IR CVC TUNNEL REMOVAL RIGHT  12/2/2021       Focused Physical exam pertinent to procedure:          (Details of heart, lung, ASA assessment and mallampati assessment in pre procedure assessment flowsheet)  General- healthy,alert,no distress   Recent vital signs-  /81 (BP Location: Right arm, Patient Position: Sitting, Cuff Size: Adult Large)   Pulse 83   Temp 97.9  F (36.6  C) (Oral)   Resp 16   Wt 74.6 kg (164 lb 6.4 oz)   SpO2 100%   BMI 27.36 kg/m    HEART-regular rate and rhythm and no murmurs, gallops, or rub  LUNGS-Clear to Ausculation  OROPHARYNGEAL - MALLAMPATTI- Class II (visualization of the soft palate, fauces, and uvula)    A/P:Reviewed history, medications, allergies, clinical information and pre procedure assessment. The patient was informed of the risks and benefits of the procedure.  They would like to proceed.  June Ronquillo is approved for use of sedation during their procedure as noted above.    Deborah Carmona PA-C

## 2022-03-07 NOTE — PROGRESS NOTES
Patient Name: June Ronquillo  Patient MRN: [id number]  Patient : 1970    Abbreviated H&P and Pre-sedation Assessment for bone marrow biopsy (procedure name) with sedation    Chief complaint and/or reason for Procedure: multiple myeloma    Planned level of sedation: General anesthesia    History of problems with sedation: (patient or family hx): No    ASA Assessment Category: 2 - Mild systemic disease    History of sleep apnea: sleep study negative however pt feels he has sleep apnea     History of blood thinners: No     Appropriate NPO status: Yes, last food 8pm 3/6, small sip of water at 7am    Current tobacco use: No    Any recent fever, cough, chest or sinus congestion, SOB, weight loss, chest pain, diarrhea or constipation. No    Medications   Currently Scheduled Medications       Home Med List)  (Not in a hospital admission)      Allergies  Blood transfusion related (informational only)    PMH:  Past Medical History:   Diagnosis Date     CAD (coronary artery disease)     s/p stent to CFX     Heart attack (H)      Hyperlipidemia LDL goal <100      Hypertension      Stented coronary artery      Thyroid disease        Past Surgical History:   Procedure Laterality Date     BONE MARROW BIOPSY, BONE SPECIMEN, NEEDLE/TROCAR Left 2021    Procedure: BIOPSY, BONE MARROW with aspiration and ancillary studies;  Surgeon: Niharika Regalado MD;  Location: [facility] OR     BONE MARROW BIOPSY, BONE SPECIMEN, NEEDLE/TROCAR Left 10/14/2021    Procedure: BIOPSY, BONE MARROW;  Surgeon: Alexa Albert APRN CNP;  Location: [facility] OR     HEART CATH PTCA SINGLE VESSEL  10/10/2004    Stent to [facility]      INSERT CATHETER VASCULAR ACCESS Right 11/3/2021    Procedure: NON VALVED TUNNELED DUAL LUMEN APHARESIS CAPABLE LINE INSERTION, VASCULAR ACCESS CATHETER;  Surgeon: Werner Mcnamara MD;  Location: [facility] OR     IR CVC TUNNEL PLACEMENT > 5 YRS OF AGE  11/3/2021     IR CVC TUNNEL REMOVAL RIGHT   12/2/2021       Focused Physical exam pertinent to procedure:          (Details of heart, lung, ASA assessment and mallampati assessment in pre procedure assessment flowsheet)  General- healthy,alert,no distress   Recent vital signs-  /81 (BP Location: Right arm, Patient Position: Sitting, Cuff Size: Adult Large)   Pulse 83   Temp 97.9  F (36.6  C) (Oral)   Resp 16   Wt 74.6 kg (164 lb 6.4 oz)   SpO2 100%   BMI 27.36 kg/m    HEART-regular rate and rhythm and no murmurs, gallops, or rub  LUNGS-Clear to Ausculation  OROPHARYNGEAL - MALLAMPATTI- Class II (visualization of the soft palate, fauces, and uvula)    A/P:Reviewed history, medications, allergies, clinical information and pre procedure assessment. The patient was informed of the risks and benefits of the procedure.  They would like to proceed.  June Ronquillo is approved for use of sedation during their procedure as noted above.    JUANITA JamaC

## 2022-03-07 NOTE — ANESTHESIA POSTPROCEDURE EVALUATION
Patient: June Ronquillo    Procedure: Procedure(s):  BIOPSY, BONE MARROW       Anesthesia Type:  MAC    Note:  Disposition: Outpatient   Postop Pain Control: Uneventful            Sign Out: Well controlled pain   PONV:    Neuro/Psych: Uneventful            Sign Out: Acceptable/Baseline neuro status   Airway/Respiratory: Uneventful            Sign Out: Acceptable/Baseline resp. status   CV/Hemodynamics: Uneventful            Sign Out: Acceptable CV status; No obvious hypovolemia; No obvious fluid overload   Other NRE:    DID A NON-ROUTINE EVENT OCCUR?            Last vitals:  Vitals Value Taken Time   /74 03/07/22 1127   Temp 36.5  C (97.7  F) 03/07/22 1127   Pulse     Resp 18 03/07/22 1127   SpO2 100 % 03/07/22 1127       Electronically Signed By: Chinmay Gordon MD  March 7, 2022  11:36 AM

## 2022-03-07 NOTE — ANESTHESIA CARE TRANSFER NOTE
Patient: June Ronquillo    Procedure: Procedure(s):  BIOPSY, BONE MARROW       Diagnosis: Multiple myeloma, remission status unspecified (H) [C90.00]  Diagnosis Additional Information: No value filed.    Anesthesia Type:   MAC     Note:    Oropharynx: oropharynx clear of all foreign objects  Level of Consciousness: iatrogenic sedation  Oxygen Supplementation: room air        Vital Signs Stable: post-procedure vital signs reviewed and stable  Report to RN Given: handoff report given  Patient transferred to: Phase II    Handoff Report: Identifed the Patient, Identified the Reponsible Provider, Reviewed the pertinent medical history, Discussed the surgical course, Reviewed Intra-OP anesthesia mangement and issues during anesthesia, Set expectations for post-procedure period and Allowed opportunity for questions and acknowledgement of understanding      Vitals:  Vitals Value Taken Time   /74 03/07/22 1127   Temp 36.5  C (97.7  F) 03/07/22 1127   Pulse     Resp 18 03/07/22 1127   SpO2 100 % 03/07/22 1127       Electronically Signed By: Chinmay Gordon MD  March 7, 2022  11:37 AM

## 2022-03-08 LAB
ALBUMIN SERPL ELPH-MCNC: 4.4 G/DL (ref 3.7–5.1)
ALPHA1 GLOB SERPL ELPH-MCNC: 0.2 G/DL (ref 0.2–0.4)
ALPHA2 GLOB SERPL ELPH-MCNC: 0.5 G/DL (ref 0.5–0.9)
B-GLOBULIN SERPL ELPH-MCNC: 0.7 G/DL (ref 0.6–1)
GAMMA GLOB SERPL ELPH-MCNC: 0.3 G/DL (ref 0.7–1.6)
INTERPRETATION: NORMAL
M PROTEIN SERPL ELPH-MCNC: 0 G/DL
PATH REPORT.COMMENTS IMP SPEC: NORMAL
PATH REPORT.FINAL DX SPEC: NORMAL
PATH REPORT.FINAL DX SPEC: NORMAL
PATH REPORT.GROSS SPEC: NORMAL
PATH REPORT.MICROSCOPIC SPEC OTHER STN: NORMAL
PATH REPORT.RELEVANT HX SPEC: NORMAL
PATH REPORT.RELEVANT HX SPEC: NORMAL
PROT PATTERN SERPL ELPH-IMP: ABNORMAL
PROT PATTERN SERPL IFE-IMP: NORMAL

## 2022-03-09 ENCOUNTER — TELEPHONE (OUTPATIENT)
Dept: ONCOLOGY | Facility: CLINIC | Age: 52
End: 2022-03-09
Payer: COMMERCIAL

## 2022-03-09 NOTE — ORAL ONC MGMT
Oral Chemotherapy Monitoring Program    Subjective/Objective:  June Ronquillo is a 51 year old male contacted by phone for a follow-up visit for oral chemotherapy. Patient confirms he is taking Revlimid 10 mg po daily, which he started last week but cannot remember which day. He reports mild nausea on the first day but this resolved quickly. He denies other adverse effects and has not missed any doses. No new drug interactions noted.    ORAL CHEMOTHERAPY 8/21/2021 8/31/2021 9/20/2021 11/5/2021 2/24/2022 2/25/2022 3/9/2022   Assessment Type Chart Review Refill Refill Discontinuation Initial Work up New Teach -   Stop Date - - - 10/20/2021 - - -   Reason for Discontinuation - - - Disease progression - - -   Diagnosis Code Multiple Myeloma Multiple Myeloma Multiple Myeloma Multiple Myeloma Multiple Myeloma Multiple Myeloma Multiple Myeloma   Providers Dr. George Vazquez   [facility]   Drug Name Revlimid (lenalidomide) Revlimid (lenalidomide) Revlimid (lenalidomide) Revlimid (lenalidomide) Revlimid (lenalidomide) Revlimid (lenalidomide) Revlimid (lenalidomide)   Dose 25 mg 25 mg 25 mg 25 mg 10 mg 10 mg 10 mg   Current Schedule Daily Daily Daily Daily Daily Daily Daily   Cycle Details 2 weeks on, 1 week off 2 weeks on, 1 week off 2 weeks on, 1 week off 2 weeks on, 1 week off Continuous Continuous Continuous   Start Date of Last Cycle - - - - - - -   Planned next cycle start date - - - - - - -   Doses missed in last 2 weeks - - - - - - -   Adherence Assessment - - - - - - -   Adverse Effects - - - - - - -   Any new drug interactions? - - - - No No -   Is the dose as ordered appropriate for the patient? - - - - Yes Yes -   Since the last time we talked, have you been hospitalized or used the emergency room? - - - - - - -       Last PHQ-2 Score on record:   PHQ-2 ( 1999 [employer]) 11/29/2021  "10/19/2021   Q1: Little interest or pleasure in doing things 0 0   Q2: Feeling down, depressed or hopeless 0 0   PHQ-2 Score 0 0   PHQ-2 Total Score (12-17 Years)- Positive if 3 or more points; Administer PHQ-A if positive 0 0       Vitals:  BP:   BP Readings from Last 1 Encounters:   03/07/22 120/75     Wt Readings from Last 1 Encounters:   03/07/22 77.1 kg (170 lb)     Estimated body surface area is 1.89 meters squared as calculated from the following:    Height as of 3/7/22: 1.676 m (5' 6\").    Weight as of 3/7/22: 77.1 kg (170 lb).    Labs:  _  Result Component Current Result Ref Range   Sodium 141 (3/7/2022) 133 - 144 mmol/L     _  Result Component Current Result Ref Range   Potassium 3.6 (3/7/2022) 3.4 - 5.3 mmol/L     _  Result Component Current Result Ref Range   Calcium 8.8 (3/7/2022) 8.5 - 10.1 mg/dL     _  Result Component Current Result Ref Range   Magnesium 1.9 (3/7/2022) 1.6 - 2.3 mg/dL     _  Result Component Current Result Ref Range   Phosphorus 3.2 (3/7/2022) 2.5 - 4.5 mg/dL     _  Result Component Current Result Ref Range   Albumin 3.8 (3/7/2022) 3.4 - 5.0 g/dL     _  Result Component Current Result Ref Range   Urea Nitrogen 17 (3/7/2022) 7 - 30 mg/dL     _  Result Component Current Result Ref Range   Creatinine 0.95 (3/7/2022) 0.66 - 1.25 mg/dL     _  Result Component Current Result Ref Range   AST 16 (3/7/2022) 0 - 45 U/L     _  Result Component Current Result Ref Range   ALT 23 (3/7/2022) 0 - 70 U/L     _  Result Component Current Result Ref Range   Bilirubin Total 0.3 (3/7/2022) 0.2 - 1.3 mg/dL     _  Result Component Current Result Ref Range   WBC Count 7.6 (3/7/2022) 4.0 - 11.0 10e3/uL    7.7 (3/7/2022) 4.0 - 11.0 10e3/uL     _  Result Component Current Result Ref Range   Hemoglobin 12.2 (L) (3/7/2022) 13.3 - 17.7 g/dL    12.4 (L) (3/7/2022) 13.3 - 17.7 g/dL     _  Result Component Current Result Ref Range   Platelet Count 350 (3/7/2022) 150 - 450 10e3/uL    349 (3/7/2022) 150 - 450 10e3/uL "     No results found for ANC within last 30 days.     _  Result Component Current Result Ref Range   Absolute Neutrophils 3.9 (3/7/2022) 1.6 - 8.3 10e3/uL    4.0 (3/7/2022) 1.6 - 8.3 10e3/uL          Assessment/Plan:  Patient is tolerating Revlimid 10 mg daily - continue as prescribed.    Follow-Up:  3/24/22 for labs and infusion appointments    Flori Lima, PharmD, BCPS  Oral Chemotherapy Monitoring Program  [facility]  [phone]  March 9, 2022

## 2022-03-10 DIAGNOSIS — C90.00 MULTIPLE MYELOMA NOT HAVING ACHIEVED REMISSION (H): ICD-10-CM

## 2022-03-11 RX ORDER — ACYCLOVIR 800 MG/1
800 TABLET ORAL 2 TIMES DAILY
Qty: 180 TABLET | Refills: 1 | Status: SHIPPED | OUTPATIENT
Start: 2022-03-11 | End: 2022-09-13

## 2022-03-11 NOTE — PROGRESS NOTES
This is a recent snapshot of the patient's [facility] medical record.  For current drug dose and complete information and questions, call [phone] or In Basket pool, fv home infusion ([id number])  CSN Number:  [id number]

## 2022-03-11 NOTE — TELEPHONE ENCOUNTER
acyclovir (ZOVIRAX) 800 MG tablet  Last Written Prescription Date:  11/11/2021  Last Fill Quantity: 60,   # refills: 3  Last Office Visit : 3/7/2022  Future Office visit:   3/24/2022    Routing refill request to provider for review/approval because:  Last filled by [facility]  Refer to clinic for new order to be sent to pharm   For Pt care.       Sarahi Bobby RN  Central Triage Red Flags/Med Refills

## 2022-03-12 ENCOUNTER — INFUSION THERAPY VISIT (OUTPATIENT)
Dept: ONCOLOGY | Facility: CLINIC | Age: 52
End: 2022-03-12
Payer: COMMERCIAL

## 2022-03-12 VITALS
OXYGEN SATURATION: 100 % | SYSTOLIC BLOOD PRESSURE: 136 MMHG | TEMPERATURE: 97.9 F | HEART RATE: 79 BPM | DIASTOLIC BLOOD PRESSURE: 84 MMHG | RESPIRATION RATE: 18 BRPM

## 2022-03-12 DIAGNOSIS — Z94.84 HISTORY OF PERIPHERAL STEM CELL TRANSPLANT (H): Primary | ICD-10-CM

## 2022-03-12 PROCEDURE — 96372 THER/PROPH/DIAG INJ SC/IM: CPT | Performed by: INTERNAL MEDICINE

## 2022-03-12 PROCEDURE — M0220 HC INJECTION TIXAGEVIMAB & CILGAVIMAB (EVUSHELD): HCPCS

## 2022-03-12 PROCEDURE — 250N000011 HC RX IP 250 OP 636: Performed by: INTERNAL MEDICINE

## 2022-03-12 RX ORDER — MEPERIDINE HYDROCHLORIDE 25 MG/ML
25 INJECTION INTRAMUSCULAR; INTRAVENOUS; SUBCUTANEOUS EVERY 30 MIN PRN
Status: CANCELLED | OUTPATIENT
Start: 2022-03-12

## 2022-03-12 RX ORDER — MEPERIDINE HYDROCHLORIDE 25 MG/ML
25 INJECTION INTRAMUSCULAR; INTRAVENOUS; SUBCUTANEOUS EVERY 30 MIN PRN
Status: DISCONTINUED | OUTPATIENT
Start: 2022-03-12 | End: 2022-03-12 | Stop reason: HOSPADM

## 2022-03-12 RX ORDER — METHYLPREDNISOLONE SODIUM SUCCINATE 125 MG/2ML
125 INJECTION, POWDER, LYOPHILIZED, FOR SOLUTION INTRAMUSCULAR; INTRAVENOUS
Status: CANCELLED
Start: 2022-03-12

## 2022-03-12 RX ORDER — ALBUTEROL SULFATE 90 UG/1
1-2 AEROSOL, METERED RESPIRATORY (INHALATION)
Status: CANCELLED
Start: 2022-03-12

## 2022-03-12 RX ORDER — NALOXONE HYDROCHLORIDE 0.4 MG/ML
0.2 INJECTION, SOLUTION INTRAMUSCULAR; INTRAVENOUS; SUBCUTANEOUS
Status: DISCONTINUED | OUTPATIENT
Start: 2022-03-12 | End: 2022-03-12 | Stop reason: HOSPADM

## 2022-03-12 RX ORDER — ALBUTEROL SULFATE 0.83 MG/ML
2.5 SOLUTION RESPIRATORY (INHALATION)
Status: DISCONTINUED | OUTPATIENT
Start: 2022-03-12 | End: 2022-03-12 | Stop reason: HOSPADM

## 2022-03-12 RX ORDER — DIPHENHYDRAMINE HYDROCHLORIDE 50 MG/ML
50 INJECTION INTRAMUSCULAR; INTRAVENOUS
Status: DISCONTINUED | OUTPATIENT
Start: 2022-03-12 | End: 2022-03-12 | Stop reason: HOSPADM

## 2022-03-12 RX ORDER — NALOXONE HYDROCHLORIDE 0.4 MG/ML
0.2 INJECTION, SOLUTION INTRAMUSCULAR; INTRAVENOUS; SUBCUTANEOUS
Status: CANCELLED | OUTPATIENT
Start: 2022-03-12

## 2022-03-12 RX ORDER — EPINEPHRINE 1 MG/ML
0.3 INJECTION, SOLUTION INTRAMUSCULAR; SUBCUTANEOUS EVERY 5 MIN PRN
Status: DISCONTINUED | OUTPATIENT
Start: 2022-03-12 | End: 2022-03-12 | Stop reason: HOSPADM

## 2022-03-12 RX ORDER — EPINEPHRINE 1 MG/ML
0.3 INJECTION, SOLUTION INTRAMUSCULAR; SUBCUTANEOUS EVERY 5 MIN PRN
Status: CANCELLED | OUTPATIENT
Start: 2022-03-12

## 2022-03-12 RX ORDER — ALBUTEROL SULFATE 0.83 MG/ML
2.5 SOLUTION RESPIRATORY (INHALATION)
Status: CANCELLED | OUTPATIENT
Start: 2022-03-12

## 2022-03-12 RX ORDER — DIPHENHYDRAMINE HYDROCHLORIDE 50 MG/ML
50 INJECTION INTRAMUSCULAR; INTRAVENOUS
Status: CANCELLED
Start: 2022-03-12

## 2022-03-12 RX ORDER — ALBUTEROL SULFATE 90 UG/1
1-2 AEROSOL, METERED RESPIRATORY (INHALATION)
Status: DISCONTINUED | OUTPATIENT
Start: 2022-03-12 | End: 2022-03-12 | Stop reason: HOSPADM

## 2022-03-12 RX ORDER — METHYLPREDNISOLONE SODIUM SUCCINATE 125 MG/2ML
125 INJECTION, POWDER, LYOPHILIZED, FOR SOLUTION INTRAMUSCULAR; INTRAVENOUS
Status: DISCONTINUED | OUTPATIENT
Start: 2022-03-12 | End: 2022-03-12 | Stop reason: HOSPADM

## 2022-03-12 RX ADMIN — Medication: at 07:52

## 2022-03-12 ASSESSMENT — PAIN SCALES - GENERAL: PAINLEVEL: NO PAIN (0)

## 2022-03-12 NOTE — NURSING NOTE
EVUSHELD Administration Note:  June Ronquillo presents today for EVUSHELD.   present during visit today: Not Applicable.    Consent:   Informed Consent confirmed in chart, obtained on this date: 1/19/22    Post Injection Assessment:   Patient tolerated injection without incident.      Patient was observed in the room for a minimum of 10 minutes after injection per standard, then remained in the buidling for a total 60 minute observation period.         Discharge Plan:    Patient discharged in stable condition accompanied by: self.     Elenita Gatica LPN on 3/12/2022 at 8:53 AM

## 2022-03-14 NOTE — PROGRESS NOTES
This is a recent snapshot of the patient's [facility] medical record.  For current drug dose and complete information and questions, call [phone] or In Basket pool, fv home infusion ([id number])  CSN Number:  [id number]

## 2022-03-15 ENCOUNTER — TELEPHONE (OUTPATIENT)
Dept: TRANSPLANT | Facility: CLINIC | Age: 52
End: 2022-03-15
Payer: COMMERCIAL

## 2022-03-15 NOTE — TELEPHONE ENCOUNTER
Patient called triage line to report diarrhea and fever.  His symptoms started 3/11.  His last fever was 3/13.  On 3/12 and 3/13, he had diarrhea every hour.  3/14 he had diarrhea just 4 times. Today, 3/15, he has had diarrhea twice.    Patient also recently started on revlimid.    Given that his diarrhea corresponded with fevers, I suspect this was an infectious diarrhea that is now spontaneously improving.    Note that patient actually follows with Dr. Vazquez at this point, not BMT.  Advised that if his symptoms do not continue to resolve, he should be seen by primary care.  Also advised he send Dr. Vazquez a [employer] message to discuss his revlimid.    Sneha Vance PA-C  3/15/2022

## 2022-03-16 LAB — CULTURE HARVEST COMPLETE DATE: NORMAL

## 2022-03-17 DIAGNOSIS — G62.9 PERIPHERAL POLYNEUROPATHY: ICD-10-CM

## 2022-03-17 DIAGNOSIS — Z94.81 STATUS POST BONE MARROW TRANSPLANT (H): ICD-10-CM

## 2022-03-17 RX ORDER — LOSARTAN POTASSIUM 50 MG/1
50 TABLET ORAL DAILY
Qty: 30 TABLET | Refills: 1 | Status: CANCELLED | OUTPATIENT
Start: 2022-03-17

## 2022-03-18 ENCOUNTER — TELEPHONE (OUTPATIENT)
Dept: OTOLARYNGOLOGY | Facility: CLINIC | Age: 52
End: 2022-03-18
Payer: COMMERCIAL

## 2022-03-18 NOTE — TELEPHONE ENCOUNTER
Left message regarding scheduling surgery/procedure with Dr. Sepulveda.   Writer left call back number on the patients voicemail.       Ana Cristina Johnson on 3/18/2022 at 1:31 PM   P: [phone]

## 2022-03-18 NOTE — TELEPHONE ENCOUNTER
----- Message from Luis Liriano, RN sent at 3/15/2022 11:07 AM CDT -----  Regarding: Please call pt  Aiden De La Paz,    I am a RN care coordinator with the BMT program and I just received a call from June re: a message you sent him about scheduling a thyroid procedure with Dr. Sepulveda.  Logan states that he can't reply to your message and has had a hard time connecting with the office to schedule.  He is requesting that you or someone from the scheduling team please call him (instead of sending mychart message) to schedule this procedure.  The number he would you to call is [phone].    Thank you,Luis

## 2022-03-21 DIAGNOSIS — C90.00 MULTIPLE MYELOMA NOT HAVING ACHIEVED REMISSION (H): Primary | ICD-10-CM

## 2022-03-22 ENCOUNTER — CARE COORDINATION (OUTPATIENT)
Dept: CARDIOLOGY | Facility: CLINIC | Age: 52
End: 2022-03-22
Payer: COMMERCIAL

## 2022-03-22 ENCOUNTER — MYC MEDICAL ADVICE (OUTPATIENT)
Dept: CARDIOLOGY | Facility: CLINIC | Age: 52
End: 2022-03-22
Payer: COMMERCIAL

## 2022-03-22 ENCOUNTER — TELEPHONE (OUTPATIENT)
Dept: SURGERY | Facility: CLINIC | Age: 52
End: 2022-03-22
Payer: COMMERCIAL

## 2022-03-22 DIAGNOSIS — I25.10 CORONARY ARTERY DISEASE INVOLVING NATIVE CORONARY ARTERY OF NATIVE HEART WITHOUT ANGINA PECTORIS: ICD-10-CM

## 2022-03-22 DIAGNOSIS — E78.5 HYPERLIPIDEMIA LDL GOAL <100: ICD-10-CM

## 2022-03-22 DIAGNOSIS — G62.9 PERIPHERAL POLYNEUROPATHY: ICD-10-CM

## 2022-03-22 DIAGNOSIS — Z95.5 PRESENCE OF DRUG-ELUTING STENT IN LEFT CIRCUMFLEX CORONARY ARTERY: Primary | ICD-10-CM

## 2022-03-22 DIAGNOSIS — I10 BENIGN ESSENTIAL HYPERTENSION: ICD-10-CM

## 2022-03-22 DIAGNOSIS — I25.2 HISTORY OF ACUTE INFERIOR WALL MI: ICD-10-CM

## 2022-03-22 DIAGNOSIS — Z94.81 STATUS POST BONE MARROW TRANSPLANT (H): ICD-10-CM

## 2022-03-22 PROBLEM — C73 MALIGNANT NEOPLASM OF THYROID GLAND (H): Status: ACTIVE | Noted: 2022-03-22

## 2022-03-22 RX ORDER — ACETAMINOPHEN 325 MG/1
650 TABLET ORAL ONCE
Status: CANCELLED
Start: 2022-03-22 | End: 2022-03-22

## 2022-03-22 RX ORDER — NALOXONE HYDROCHLORIDE 0.4 MG/ML
0.2 INJECTION, SOLUTION INTRAMUSCULAR; INTRAVENOUS; SUBCUTANEOUS
Status: CANCELLED | OUTPATIENT
Start: 2022-03-22

## 2022-03-22 RX ORDER — LOSARTAN POTASSIUM 50 MG/1
50 TABLET ORAL DAILY
Qty: 30 TABLET | Refills: 1 | Status: SHIPPED | OUTPATIENT
Start: 2022-03-22 | End: 2022-05-18

## 2022-03-22 RX ORDER — ALBUTEROL SULFATE 90 UG/1
1-2 AEROSOL, METERED RESPIRATORY (INHALATION)
Status: CANCELLED
Start: 2022-03-22

## 2022-03-22 RX ORDER — METHYLPREDNISOLONE SODIUM SUCCINATE 125 MG/2ML
125 INJECTION, POWDER, LYOPHILIZED, FOR SOLUTION INTRAMUSCULAR; INTRAVENOUS
Status: CANCELLED
Start: 2022-03-22

## 2022-03-22 RX ORDER — DIPHENHYDRAMINE HYDROCHLORIDE 50 MG/ML
50 INJECTION INTRAMUSCULAR; INTRAVENOUS
Status: CANCELLED
Start: 2022-03-22

## 2022-03-22 RX ORDER — ALBUTEROL SULFATE 0.83 MG/ML
2.5 SOLUTION RESPIRATORY (INHALATION)
Status: CANCELLED | OUTPATIENT
Start: 2022-03-22

## 2022-03-22 RX ORDER — DIPHENHYDRAMINE HCL 25 MG
50 CAPSULE ORAL ONCE
Status: CANCELLED
Start: 2022-03-22 | End: 2022-03-22

## 2022-03-22 RX ORDER — MEPERIDINE HYDROCHLORIDE 25 MG/ML
25 INJECTION INTRAMUSCULAR; INTRAVENOUS; SUBCUTANEOUS EVERY 30 MIN PRN
Status: CANCELLED | OUTPATIENT
Start: 2022-03-22

## 2022-03-22 RX ORDER — MONTELUKAST SODIUM 10 MG/1
10 TABLET ORAL ONCE
Status: CANCELLED
Start: 2022-03-22 | End: 2022-03-22

## 2022-03-22 RX ORDER — DEXAMETHASONE 4 MG/1
20 TABLET ORAL ONCE
Status: CANCELLED
Start: 2022-03-22 | End: 2022-03-22

## 2022-03-22 RX ORDER — EPINEPHRINE 1 MG/ML
0.3 INJECTION, SOLUTION INTRAMUSCULAR; SUBCUTANEOUS EVERY 5 MIN PRN
Status: CANCELLED | OUTPATIENT
Start: 2022-03-22

## 2022-03-22 NOTE — TELEPHONE ENCOUNTER
Called patient to schedule surgery with Dr. Sepulveda    Date of Surgery: 5/3    Location of surgery: [facility]    Pre-Op H&P: PCP [facility]    Imaging Scheduled:  No    Scheduled COVID-19 Testing: Yes    Post-Op Appt Date: 5/16    Surgery Packet Mailed: yes    Additional comments: patient aware of surgical date. Will schedule pre op with PCP and have covid done at [facility].  Patient will wait fo further instruction from RN closer to date of surgery            Anna C. Schoenecker on 3/22/2022 at 4:14 PM

## 2022-03-22 NOTE — PROGRESS NOTES
Received patient MyChart message that he needs to have Losartan refill.  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  He is saying that he can come in about a month or so, it will be safe at that time.  Writer has entered new appointment request,  will reach out to patient.  Writer has refilled the Losartan for a short time, in order to allow patient to come in to see Dr. Craig.     has sent MyChart message to June letting him know these things.

## 2022-03-22 NOTE — TELEPHONE ENCOUNTER
"Requested Prescriptions   Pending Prescriptions Disp Refills     losartan (COZAAR) 50 MG tablet 30 tablet 1     Sig: Take 1 tablet (50 mg) by mouth daily       Angiotensin-II Receptors Failed - 3/17/2022 10:34 AM        Failed - Recent (12 mo) or future (30 days) visit within the authorizing provider's specialty     Patient has had an office visit with the authorizing provider or a provider within the authorizing providers department within the previous 12 mos or has a future within next 30 days. See \"Patient Info\" tab in inbasket, or \"Choose Columns\" in Meds & Orders section of the refill encounter.              Passed - Last blood pressure under 140/90 in past 12 months     BP Readings from Last 3 Encounters:   03/12/22 136/84   03/07/22 120/75   03/07/22 123/81                 Passed - Medication is active on med list        Passed - Patient is age 18 or older        Passed - Normal serum creatinine on file in past 12 months     Recent Labs   Lab Test 03/07/22  0834   CR 0.95       Ok to refill medication if creatinine is low          Passed - Normal serum potassium on file in past 12 months     Recent Labs   Lab Test 03/07/22  0834   POTASSIUM 3.6                       Routing refill request to provider for review/approval because:  Patient needs to be seen because it has been more than 1 year since last office visit.    Thanks,  ZACK Rivas  [facility]           "

## 2022-03-24 ENCOUNTER — INFUSION THERAPY VISIT (OUTPATIENT)
Dept: ONCOLOGY | Facility: CLINIC | Age: 52
End: 2022-03-24
Attending: STUDENT IN AN ORGANIZED HEALTH CARE EDUCATION/TRAINING PROGRAM
Payer: COMMERCIAL

## 2022-03-24 ENCOUNTER — APPOINTMENT (OUTPATIENT)
Dept: LAB | Facility: CLINIC | Age: 52
End: 2022-03-24
Attending: STUDENT IN AN ORGANIZED HEALTH CARE EDUCATION/TRAINING PROGRAM
Payer: COMMERCIAL

## 2022-03-24 VITALS
RESPIRATION RATE: 18 BRPM | BODY MASS INDEX: 26.31 KG/M2 | DIASTOLIC BLOOD PRESSURE: 82 MMHG | OXYGEN SATURATION: 100 % | SYSTOLIC BLOOD PRESSURE: 145 MMHG | TEMPERATURE: 97.9 F | WEIGHT: 163 LBS | HEART RATE: 87 BPM

## 2022-03-24 DIAGNOSIS — C90.00 MULTIPLE MYELOMA NOT HAVING ACHIEVED REMISSION (H): Primary | ICD-10-CM

## 2022-03-24 LAB
ALBUMIN SERPL-MCNC: 3.7 G/DL (ref 3.4–5)
ALP SERPL-CCNC: 55 U/L (ref 40–150)
ALT SERPL W P-5'-P-CCNC: 19 U/L (ref 0–70)
ANION GAP SERPL CALCULATED.3IONS-SCNC: 7 MMOL/L (ref 3–14)
AST SERPL W P-5'-P-CCNC: 18 U/L (ref 0–45)
BASOPHILS # BLD AUTO: 0 10E3/UL (ref 0–0.2)
BASOPHILS NFR BLD AUTO: 1 %
BILIRUB SERPL-MCNC: 0.2 MG/DL (ref 0.2–1.3)
BUN SERPL-MCNC: 12 MG/DL (ref 7–30)
CALCIUM SERPL-MCNC: 8.6 MG/DL (ref 8.5–10.1)
CHLORIDE BLD-SCNC: 109 MMOL/L (ref 94–109)
CO2 SERPL-SCNC: 23 MMOL/L (ref 20–32)
CREAT SERPL-MCNC: 1.06 MG/DL (ref 0.66–1.25)
EOSINOPHIL # BLD AUTO: 0.3 10E3/UL (ref 0–0.7)
EOSINOPHIL NFR BLD AUTO: 4 %
ERYTHROCYTE [DISTWIDTH] IN BLOOD BY AUTOMATED COUNT: 13.4 % (ref 10–15)
GFR SERPL CREATININE-BSD FRML MDRD: 84 ML/MIN/1.73M2
GLUCOSE BLD-MCNC: 99 MG/DL (ref 70–99)
HCT VFR BLD AUTO: 37.4 % (ref 40–53)
HGB BLD-MCNC: 12.1 G/DL (ref 13.3–17.7)
IMM GRANULOCYTES # BLD: 0 10E3/UL
IMM GRANULOCYTES NFR BLD: 0 %
INTERPRETATION: NORMAL
LYMPHOCYTES # BLD AUTO: 2.9 10E3/UL (ref 0.8–5.3)
LYMPHOCYTES NFR BLD AUTO: 40 %
MCH RBC QN AUTO: 30.7 PG (ref 26.5–33)
MCHC RBC AUTO-ENTMCNC: 32.4 G/DL (ref 31.5–36.5)
MCV RBC AUTO: 95 FL (ref 78–100)
MONOCYTES # BLD AUTO: 1 10E3/UL (ref 0–1.3)
MONOCYTES NFR BLD AUTO: 14 %
NEUTROPHILS # BLD AUTO: 3 10E3/UL (ref 1.6–8.3)
NEUTROPHILS NFR BLD AUTO: 41 %
NRBC # BLD AUTO: 0 10E3/UL
NRBC BLD AUTO-RTO: 0 /100
PLATELET # BLD AUTO: 411 10E3/UL (ref 150–450)
POTASSIUM BLD-SCNC: 3.8 MMOL/L (ref 3.4–5.3)
PROT SERPL-MCNC: 6.9 G/DL (ref 6.8–8.8)
RBC # BLD AUTO: 3.94 10E6/UL (ref 4.4–5.9)
SODIUM SERPL-SCNC: 139 MMOL/L (ref 133–144)
TOTAL PROTEIN SERUM FOR ELP: 6.5 G/DL (ref 6.8–8.8)
WBC # BLD AUTO: 7.3 10E3/UL (ref 4–11)

## 2022-03-24 PROCEDURE — 258N000003 HC RX IP 258 OP 636: Performed by: STUDENT IN AN ORGANIZED HEALTH CARE EDUCATION/TRAINING PROGRAM

## 2022-03-24 PROCEDURE — 84165 PROTEIN E-PHORESIS SERUM: CPT | Mod: 26 | Performed by: PATHOLOGY

## 2022-03-24 PROCEDURE — 86334 IMMUNOFIX E-PHORESIS SERUM: CPT | Mod: 26 | Performed by: PATHOLOGY

## 2022-03-24 PROCEDURE — 83521 IG LIGHT CHAINS FREE EACH: CPT

## 2022-03-24 PROCEDURE — 80053 COMPREHEN METABOLIC PANEL: CPT

## 2022-03-24 PROCEDURE — 84165 PROTEIN E-PHORESIS SERUM: CPT | Mod: TC | Performed by: PATHOLOGY

## 2022-03-24 PROCEDURE — 250N000011 HC RX IP 250 OP 636: Performed by: STUDENT IN AN ORGANIZED HEALTH CARE EDUCATION/TRAINING PROGRAM

## 2022-03-24 PROCEDURE — 82784 ASSAY IGA/IGD/IGG/IGM EACH: CPT

## 2022-03-24 PROCEDURE — 96365 THER/PROPH/DIAG IV INF INIT: CPT

## 2022-03-24 PROCEDURE — 86334 IMMUNOFIX E-PHORESIS SERUM: CPT | Performed by: PATHOLOGY

## 2022-03-24 PROCEDURE — 84155 ASSAY OF PROTEIN SERUM: CPT | Mod: 91

## 2022-03-24 PROCEDURE — 250N000013 HC RX MED GY IP 250 OP 250 PS 637: Performed by: STUDENT IN AN ORGANIZED HEALTH CARE EDUCATION/TRAINING PROGRAM

## 2022-03-24 PROCEDURE — 85025 COMPLETE CBC W/AUTO DIFF WBC: CPT

## 2022-03-24 PROCEDURE — 36415 COLL VENOUS BLD VENIPUNCTURE: CPT

## 2022-03-24 PROCEDURE — 96401 CHEMO ANTI-NEOPL SQ/IM: CPT

## 2022-03-24 RX ORDER — DIPHENHYDRAMINE HCL 25 MG
50 CAPSULE ORAL ONCE
Status: COMPLETED | OUTPATIENT
Start: 2022-03-24 | End: 2022-03-24

## 2022-03-24 RX ORDER — DEXAMETHASONE 4 MG/1
4 TABLET ORAL DAILY
Qty: 6 TABLET | Refills: 1 | Status: SHIPPED | OUTPATIENT
Start: 2022-03-24 | End: 2022-06-24

## 2022-03-24 RX ORDER — ACETAMINOPHEN 325 MG/1
650 TABLET ORAL ONCE
Status: COMPLETED | OUTPATIENT
Start: 2022-03-24 | End: 2022-03-24

## 2022-03-24 RX ORDER — DEXAMETHASONE 4 MG/1
20 TABLET ORAL ONCE
Status: COMPLETED | OUTPATIENT
Start: 2022-03-24 | End: 2022-03-24

## 2022-03-24 RX ORDER — MONTELUKAST SODIUM 10 MG/1
10 TABLET ORAL ONCE
Status: COMPLETED | OUTPATIENT
Start: 2022-03-24 | End: 2022-03-24

## 2022-03-24 RX ADMIN — DIPHENHYDRAMINE HYDROCHLORIDE 50 MG: 25 CAPSULE ORAL at 15:33

## 2022-03-24 RX ADMIN — ZOLEDRONIC ACID 3 MG: 4 INJECTION, SOLUTION, CONCENTRATE INTRAVENOUS at 15:55

## 2022-03-24 RX ADMIN — ACETAMINOPHEN 650 MG: 325 TABLET ORAL at 15:33

## 2022-03-24 RX ADMIN — DEXAMETHASONE 20 MG: 4 TABLET ORAL at 15:32

## 2022-03-24 RX ADMIN — DARATUMUMAB AND HYALURONIDASE-FIHJ (HUMAN RECOMBINANT) 1800 MG: 1800; 30000 INJECTION SUBCUTANEOUS at 16:27

## 2022-03-24 RX ADMIN — MONTELUKAST 10 MG: 10 TABLET, FILM COATED ORAL at 15:32

## 2022-03-24 ASSESSMENT — PAIN SCALES - GENERAL: PAINLEVEL: NO PAIN (0)

## 2022-03-24 NOTE — PROGRESS NOTES
Infusion Nursing Note:  June Ronquillo presents today for C3D1 Darzalex Faspro-Zometa.    Patient seen by provider today: No   present during visit today: Not Applicable.    Note: Patient denies the following: fevers, body aches, chills, headaches, vision changes, dizziness, chest pain, shortness of breath, nausea, vomiting, constipation, abdominal pain, rashes, bruising/bleeding, mouth sores, swelling or pain in the legs. Ok for treatment.     Per pharmacy, since pt has received Darzalex in 2021 and tolerated well, no observation needed.      Intravenous Access:  Peripheral IV placed in lab.    Treatment Conditions:  Lab Results   Component Value Date    HGB 12.1 (L) 03/24/2022    WBC 7.3 03/24/2022    ANEU 7.4 11/29/2021    ANEUTAUTO 3.0 03/24/2022     03/24/2022      Lab Results   Component Value Date     03/24/2022    POTASSIUM 3.8 03/24/2022    MAG 1.9 03/07/2022    CR 1.06 03/24/2022    HARDEEP 8.6 03/24/2022    BILITOTAL 0.2 03/24/2022    ALBUMIN 3.7 03/24/2022    ALT 19 03/24/2022    AST 18 03/24/2022     Results reviewed, labs MET treatment parameters, ok to proceed with treatment.      Post Infusion Assessment:  Patient tolerated infusion without incident.  Patient tolerated Darzalex Faspro injection into RUQ of abdomen without incident.  Site patent and intact, free from redness, edema or discomfort.  No evidence of extravasations.  Access discontinued per protocol.       Discharge Plan:   Prescription refills given for DEX.  Discharge instructions reviewed with: Patient.  Patient and/or family verbalized understanding of discharge instructions and all questions answered.  AVS to patient via [employer].  Patient will return 4/20 for MD appt, IB sent to scheduling for infusion same day.   Patient discharged in stable condition accompanied by: self.  Departure Mode: Ambulatory.    Dalia Montoya RN

## 2022-03-24 NOTE — NURSING NOTE
Chief Complaint   Patient presents with     Labs Only     venipuncture, vitals checked     Alyce Gibbs RN on 3/24/2022 at 2:54 PM

## 2022-03-25 DIAGNOSIS — Z94.81 STATUS POST BONE MARROW TRANSPLANT (H): Primary | ICD-10-CM

## 2022-03-25 LAB
ALBUMIN SERPL ELPH-MCNC: 4.1 G/DL (ref 3.7–5.1)
ALPHA1 GLOB SERPL ELPH-MCNC: 0.4 G/DL (ref 0.2–0.4)
ALPHA2 GLOB SERPL ELPH-MCNC: 0.8 G/DL (ref 0.5–0.9)
B-GLOBULIN SERPL ELPH-MCNC: 0.8 G/DL (ref 0.6–1)
GAMMA GLOB SERPL ELPH-MCNC: 0.4 G/DL (ref 0.7–1.6)
IGA SERPL-MCNC: 112 MG/DL (ref 84–499)
IGG SERPL-MCNC: 411 MG/DL (ref 610–1616)
IGM SERPL-MCNC: 39 MG/DL (ref 35–242)
KAPPA LC FREE SER-MCNC: 1.76 MG/DL (ref 0.33–1.94)
KAPPA LC FREE/LAMBDA FREE SER NEPH: 0.92 {RATIO} (ref 0.26–1.65)
LAMBDA LC FREE SERPL-MCNC: 1.91 MG/DL (ref 0.57–2.63)
M PROTEIN SERPL ELPH-MCNC: 0.1 G/DL
PROT PATTERN SERPL ELPH-IMP: ABNORMAL
PROT PATTERN SERPL IFE-IMP: NORMAL

## 2022-03-25 NOTE — TELEPHONE ENCOUNTER
Called and spoke to patients. Stated he is no longer taking this medication and that his surgeon said to stop taking it.

## 2022-03-28 NOTE — TELEPHONE ENCOUNTER
Noted, we can see how he is doing off of it at his HTN visit then--or can check his BP at his health maintenance exam if due for his yearly.     Thanks,   Selma

## 2022-03-29 ENCOUNTER — TELEPHONE (OUTPATIENT)
Dept: ONCOLOGY | Facility: CLINIC | Age: 52
End: 2022-03-29
Payer: COMMERCIAL

## 2022-03-29 DIAGNOSIS — C90.00 MULTIPLE MYELOMA NOT HAVING ACHIEVED REMISSION (H): Primary | ICD-10-CM

## 2022-03-29 RX ORDER — LENALIDOMIDE 10 MG/1
10 CAPSULE ORAL DAILY
Qty: 28 CAPSULE | Refills: 0 | Status: SHIPPED | OUTPATIENT
Start: 2022-03-29 | End: 2022-05-23

## 2022-03-29 ASSESSMENT — ENCOUNTER SYMPTOMS
CONSTIPATION: 0
MYALGIAS: 1
MUSCLE WEAKNESS: 0
STIFFNESS: 0
JAUNDICE: 0
NECK PAIN: 0
BACK PAIN: 0
ARTHRALGIAS: 0
HALLUCINATIONS: 0
WEIGHT LOSS: 0
JOINT SWELLING: 0
RECTAL PAIN: 1
MUSCLE CRAMPS: 0
DECREASED APPETITE: 0
NAUSEA: 1
WEIGHT GAIN: 0
VOMITING: 0
HEARTBURN: 0
INCREASED ENERGY: 0
NIGHT SWEATS: 0
FEVER: 1
BLOOD IN STOOL: 0
POLYPHAGIA: 0
FATIGUE: 1
CHILLS: 1
POLYDIPSIA: 0
BLOATING: 0
DIARRHEA: 1
BOWEL INCONTINENCE: 0
ALTERED TEMPERATURE REGULATION: 1
ABDOMINAL PAIN: 1

## 2022-03-29 NOTE — TELEPHONE ENCOUNTER
Oral Chemotherapy Monitoring Program   Medication: Revlimid  Rx: 10mg PO daily on days 1 through 28 of 28 day cycle   Auth #: [id number]   Risk Category: Adult Male  Routine survey questions reviewed.   Rx to be Escribed to [facility]    Yu Goldberg  [facility]  Oncology Pharmacy Liaison   [email or web address]  [phone] (phone)  [phone] (fax)

## 2022-03-31 NOTE — PROGRESS NOTES
This is a recent snapshot of the patient's [facility] medical record.  For current drug dose and complete information and questions, call [phone] or In Basket pool, fv home infusion ([id number])  CSN Number:  [id number]

## 2022-04-01 DIAGNOSIS — Z11.59 ENCOUNTER FOR SCREENING FOR OTHER VIRAL DISEASES: Primary | ICD-10-CM

## 2022-04-04 ENCOUNTER — MYC MEDICAL ADVICE (OUTPATIENT)
Dept: FAMILY MEDICINE | Facility: CLINIC | Age: 52
End: 2022-04-04

## 2022-04-04 ENCOUNTER — VIRTUAL VISIT (OUTPATIENT)
Dept: ENDOCRINOLOGY | Facility: CLINIC | Age: 52
End: 2022-04-04
Payer: COMMERCIAL

## 2022-04-04 DIAGNOSIS — E06.3 HYPOTHYROIDISM DUE TO HASHIMOTO'S THYROIDITIS: Primary | ICD-10-CM

## 2022-04-04 DIAGNOSIS — R73.03 PRE-DIABETES: ICD-10-CM

## 2022-04-04 DIAGNOSIS — E04.1 THYROID NODULE: ICD-10-CM

## 2022-04-04 PROCEDURE — 99215 OFFICE O/P EST HI 40 MIN: CPT | Mod: 95 | Performed by: INTERNAL MEDICINE

## 2022-04-04 RX ORDER — ROSUVASTATIN CALCIUM 40 MG/1
1 TABLET, COATED ORAL DAILY
COMMUNITY
Start: 2022-02-02 | End: 2022-05-06

## 2022-04-04 NOTE — TELEPHONE ENCOUNTER
Routed to TC pool    Please help pt to set up pre-op appointment for his upcoming surg    Thanks  Susy DIXON

## 2022-04-04 NOTE — PROGRESS NOTES
ENDOCRINOLOGY VIDEO VISIT FOLLOW-UP    HISTORY OF PRESENT ILLNESS    June Ronquillo is a 51 year old male is being evaluated via a billable video visit for the following health issues:    1.  Hypothyroidism.  Continues on levothyroxine 50 mcg daily.  Has been tiring easily after bone marrow transplant.  Some constipation.    2.  PTC. After initial visit in 10/2021, I referred patient for thyroid US which was performed on 10/28/2021. This showed bilateral thyroid nodules and heterogenous thyroid parenchyma.  Right Lobe:  Nodule 1:  Location: Mid  Size: 0.6 x 0.5 x 0.4 cm  Composition: Solid or almost completely solid (2 points)  Echogenicity: Hypoechoic (2 points)  Shape: Wider than tall (0 points)  Margin: Smooth (0 points)  Echogenic Foci: None or large comet tail artifact (0 points)  Stability: Not previously imaged  TIRADS: TR4 (4-6 points)      Left Lobe:     Nodule 2:  Location: Mid  Size: 1.1 x 0.6 x 0.5 cm  Composition: Mixed cystic and solid (1 point)  Echogenicity: Hyperechoic or isoechoic (1 point)  Shape: Wider than tall (0 points)  Margin: Lobulated/irregular (2 points)  Echogenic Foci: Macro-calcifications (1 point)  Stability: Not previously imaged  TIRADS: TR4 (4-6 points)         Nodule 3:  Location: Inferior  Size: 1.1 x 0.7 x 0.6 cm  Composition: Solid or almost completely solid (2 points)  Echogenicity: Hyperechoic or isoechoic (1 point)  Shape: Wider than tall (0 points)  Margin: Smooth (0 points)  Echogenic Foci: None or large comet tail artifact (0 points)  Stability: Not previously imaged  TIRADS: TR3 (3 points)    After reviewing images, I referred patient for FNA of left lobe nodule--this was performed on 11/2/2021. Cytology returned consistent with papillary thyroid carcinoma.    Detailed neck US performed 11/8/2021 and showed no suspicious appearing cervical nodes.    He has consulted with Dr. Sepulveda and total thyroidectomy is planned for 5/3/2022.  The patient thought that this  was going to be a partial thyroidectomy.  Is a  and so worries about potential for vocal changes after thyroidectomy.    Pertinent endocrine and related history:  1. Hypothyroidism. Diagnosed in 1/2021, attributed to Hashimoto's thyroiditis.    2. PTC. As above, suspected based on FNA cytology. During imaging for evaluation of multiple myeloma, he was incidentally discovered to have increased FDG uptake in thyroid nodules in 8/2021 and in 10/2021, with findings as below:  - PET/CT 8/31/2021: Hypermetabolic cervical lymph nodes, 3 separate hypermetabolic foci in the thyroid gland (2 in the left lobe, 1 in the right lobe).  CT appearance of nodules was hypoattenuating with a bulky calcification in one of the left thyroid lobe nodules.  SUV max of thyroid lesions 4.95.  - PET/CT 10/15/2021: Previous mildly enlarged FDG avid right upper cervical lymph nodes were resolved, likely inflammatory.  Few tiny thyroid nodules, similar in size, with decreased (now at most mild) uptake (SUV max 2.5).  -He does not have history of excessive head or neck radiation exposure.  No family history of thyroid disease/thyroid cancer.  3. Multiple myeloma. Diagnosed in 4/2021.  He was initiated on treatment with daratumumab, Velcade, Revlimid and dexamethasone.  S/p autologous PBSCT. - PET/CT 5/11/2021: Thyroid gland appeared normal  4. High risk for diabetes.    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 MEDICAL HISTORY  Past Medical History:   Diagnosis Date     CAD (coronary artery disease)     s/p stent to CFX     Heart attack (H)      Hyperlipidemia LDL goal <100      Hypertension      Stented coronary artery      Thyroid disease        MEDICATIONS  Current Outpatient Medications   Medication Sig Dispense Refill     acyclovir (ZOVIRAX) 800 MG tablet Take 1 tablet (800 mg) by mouth 2 times daily 180 tablet 1     aspirin (ASA) 81 MG EC tablet Take 1  tablet (81 mg) by mouth daily       calcium carb-cholecalciferol (OS-HARDEEP) 500-200 MG-UNIT tablet Take 1 tablet by mouth daily 30 tablet 3     LENalidomide (REVLIMID) 10 MG CAPS capsule Take 1 capsule (10 mg) by mouth daily 28 capsule 0     levothyroxine (SYNTHROID/LEVOTHROID) 50 MCG tablet Take 1 tablet (50 mcg) by mouth daily 90 tablet 1     losartan (COZAAR) 50 MG tablet Take 1 tablet (50 mg) by mouth daily 30 tablet 1     potassium chloride ER (KLOR-CON M) 10 MEQ CR tablet Take 2 tablets (20 mEq) by mouth daily 60 tablet 0     rosuvastatin (CRESTOR) 40 MG tablet 1 tablet daily       sulfamethoxazole-trimethoprim (BACTRIM) 400-80 MG tablet Take 1 tablet by mouth 2 times daily Mondays and Tuesdays       vitamin B complex with vitamin C (STRESS TAB) tablet Take 1 tablet by mouth daily 90 tablet 3     dexamethasone (DECADRON) 4 MG tablet Take 1 tablet (4 mg) by mouth daily (Patient not taking: Reported on 4/4/2022) 6 tablet 1       Allergies, family, and social history were reviewed and documented as needed in EHR.     REVIEW OF SYSTEMS  A focused ROS was performed, with pertinent positives and negatives as noted in the HPI.    PHYSICAL EXAM  There were no vitals taken for this visit.  There is no height or weight on file to calculate BMI.  Constitutional: Patient is alert, oriented and appears in no acute distress.  Eyes: Eyes grossly normal to inspection, EOMI, no stare, lid lag, or retraction; no conjunctival injection.  ENMT: Lips are without lesions.   Neck: No visible goiter or neck mass.  Respiratory: No audible wheeze or cough. No visible cyanosis. No visible increased work of breathing.  Neurological: Alert and oriented times 3.  Cranial nerves grossly intact.      DATA REVIEW  Each of the following laboratory and/or imaging studies were reviewed.    ASSESSMENT  1.  Probable papillary thyroid carcinoma.  Based on cytology from FNA of left thyroid lobe nodule.  Neck ultrasound has not shown suspicious lymph  nodes.  Planning to move forward with total thyroidectomy with Dr. Sepulveda next month.  He was under the impression that partial thyroidectomy would be surgical plan; we discussed that given papillary thyroid carcinoma on cytology and presence of bilateral nodules, total thyroidectomy would likely be most complete approach.  We did review benefits and potential side effects: He has some concerns about potential for vocal changes after thyroidectomy.  Have encouraged him to reach out to me or his surgeon to discuss further if concerns or questions come up; for now, he is comfortable moving forward with total thyroidectomy.  We discussed postsurgical management of thyroid cancer and the potential need for radioiodine therapy.  We will plan to follow-up 4 to 6 weeks after thyroidectomy to review pathology, reevaluate thyroid function tests and discuss our options for next steps in management.    2.  Hypothyroidism.  Diagnosed in 1/2021.  Likely due to Hashimoto's thyroiditis, had positive antithyroglobulin antibody.  Clinically, appears euthyroid.  Check thyroid function tests with his next oncology lab draw later this month and adjust levothyroxine as needed.  After total thyroidectomy, would estimate levothyroxine requirement to be around 137 mcg daily.    3.  Multiple myeloma.  Following in oncology.  Status post chemotherapy and autologous PBSCT.    4.  High risk for diabetes.  Hemoglobin A1c 6% on 1/25/2021.  Plasma glucose values have overall been acceptable on labs drawn during BMT process while receiving glucocorticoids.  Check A1c with next set of labs.    PLAN  -Continue levothyroxine 50 mcg daily (dose will need to be adjusted up after thyroid surgery)  -Labs on 4/20/2022 (please ask lab to complete my orders in addition to labs ordered by oncologist)  -We will confirm that thyroidectomy is indeed planned for 5/3/2022  -Follow-up mid to end of June 2022 in person in [place] after thyroidectomy: Lab draw  1 week before visit  -We will communicate results via [employer], or if needed by phone      Orders Placed This Encounter   Procedures     Hemoglobin A1c     TSH with free T4 reflex         Video-Visit Details    Type of service:  Video Visit    Video Start Time: 11:55 AM  Video End Time: 12:13 PM    I spent a total of 51 minutes on the date of encounter reviewing medical records, evaluating the patient, coordinating care and documenting in the EHR, as detailed above.      Platform used for Video Visit: Adán Elder MD   Division of Diabetes, Endocrinology and Metabolism  Department of Medicine

## 2022-04-04 NOTE — LETTER
4/4/2022       RE: June Ronquillo  [address]  [facility] [address]     Dear Colleague,    Thank you for referring your patient, June Ronquillo, to the [facility] [place] at [facility]. Please see a copy of my visit note below.      ENDOCRINOLOGY VIDEO VISIT FOLLOW-UP    HISTORY OF PRESENT ILLNESS    June Ronquillo is a 51 year old male is being evaluated via a billable video visit for the following health issues:    1.  Hypothyroidism.  Continues on levothyroxine 50 mcg daily.  Has been tiring easily after bone marrow transplant.  Some constipation.    2.  PTC. After initial visit in 10/2021, I referred patient for thyroid US which was performed on 10/28/2021. This showed bilateral thyroid nodules and heterogenous thyroid parenchyma.  Right Lobe:  Nodule 1:  Location: Mid  Size: 0.6 x 0.5 x 0.4 cm  Composition: Solid or almost completely solid (2 points)  Echogenicity: Hypoechoic (2 points)  Shape: Wider than tall (0 points)  Margin: Smooth (0 points)  Echogenic Foci: None or large comet tail artifact (0 points)  Stability: Not previously imaged  TIRADS: TR4 (4-6 points)      Left Lobe:     Nodule 2:  Location: Mid  Size: 1.1 x 0.6 x 0.5 cm  Composition: Mixed cystic and solid (1 point)  Echogenicity: Hyperechoic or isoechoic (1 point)  Shape: Wider than tall (0 points)  Margin: Lobulated/irregular (2 points)  Echogenic Foci: Macro-calcifications (1 point)  Stability: Not previously imaged  TIRADS: TR4 (4-6 points)         Nodule 3:  Location: Inferior  Size: 1.1 x 0.7 x 0.6 cm  Composition: Solid or almost completely solid (2 points)  Echogenicity: Hyperechoic or isoechoic (1 point)  Shape: Wider than tall (0 points)  Margin: Smooth (0 points)  Echogenic Foci: None or large comet tail artifact (0 points)  Stability: Not previously imaged  TIRADS: TR3 (3 points)    After reviewing images, I referred patient for  FNA of left lobe nodule--this was performed on 11/2/2021. Cytology returned consistent with papillary thyroid carcinoma.    Detailed neck US performed 11/8/2021 and showed no suspicious appearing cervical nodes.    He has consulted with Dr. Sepulveda and total thyroidectomy is planned for 5/3/2022.  The patient thought that this was going to be a partial thyroidectomy.  Is a  and so worries about potential for vocal changes after thyroidectomy.    Pertinent endocrine and related history:  1. Hypothyroidism. Diagnosed in 1/2021, attributed to Hashimoto's thyroiditis.    2. PTC. As above, suspected based on FNA cytology. During imaging for evaluation of multiple myeloma, he was incidentally discovered to have increased FDG uptake in thyroid nodules in 8/2021 and in 10/2021, with findings as below:  - PET/CT 8/31/2021: Hypermetabolic cervical lymph nodes, 3 separate hypermetabolic foci in the thyroid gland (2 in the left lobe, 1 in the right lobe).  CT appearance of nodules was hypoattenuating with a bulky calcification in one of the left thyroid lobe nodules.  SUV max of thyroid lesions 4.95.  - PET/CT 10/15/2021: Previous mildly enlarged FDG avid right upper cervical lymph nodes were resolved, likely inflammatory.  Few tiny thyroid nodules, similar in size, with decreased (now at most mild) uptake (SUV max 2.5).  -He does not have history of excessive head or neck radiation exposure.  No family history of thyroid disease/thyroid cancer.  3. Multiple myeloma. Diagnosed in 4/2021.  He was initiated on treatment with daratumumab, Velcade, Revlimid and dexamethasone.  S/p autologous PBSCT. - PET/CT 5/11/2021: Thyroid gland appeared normal  4. High risk for diabetes.    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 MEDICAL HISTORY  Past Medical History:   Diagnosis Date     CAD (coronary artery disease)     s/p stent to CFX     Heart attack  (H)      Hyperlipidemia LDL goal <100      Hypertension      Stented coronary artery      Thyroid disease        MEDICATIONS  Current Outpatient Medications   Medication Sig Dispense Refill     acyclovir (ZOVIRAX) 800 MG tablet Take 1 tablet (800 mg) by mouth 2 times daily 180 tablet 1     aspirin (ASA) 81 MG EC tablet Take 1 tablet (81 mg) by mouth daily       calcium carb-cholecalciferol (OS-HARDEEP) 500-200 MG-UNIT tablet Take 1 tablet by mouth daily 30 tablet 3     LENalidomide (REVLIMID) 10 MG CAPS capsule Take 1 capsule (10 mg) by mouth daily 28 capsule 0     levothyroxine (SYNTHROID/LEVOTHROID) 50 MCG tablet Take 1 tablet (50 mcg) by mouth daily 90 tablet 1     losartan (COZAAR) 50 MG tablet Take 1 tablet (50 mg) by mouth daily 30 tablet 1     potassium chloride ER (KLOR-CON M) 10 MEQ CR tablet Take 2 tablets (20 mEq) by mouth daily 60 tablet 0     rosuvastatin (CRESTOR) 40 MG tablet 1 tablet daily       sulfamethoxazole-trimethoprim (BACTRIM) 400-80 MG tablet Take 1 tablet by mouth 2 times daily Mondays and Tuesdays       vitamin B complex with vitamin C (STRESS TAB) tablet Take 1 tablet by mouth daily 90 tablet 3     dexamethasone (DECADRON) 4 MG tablet Take 1 tablet (4 mg) by mouth daily (Patient not taking: Reported on 4/4/2022) 6 tablet 1       Allergies, family, and social history were reviewed and documented as needed in EHR.     REVIEW OF SYSTEMS  A focused ROS was performed, with pertinent positives and negatives as noted in the HPI.    PHYSICAL EXAM  There were no vitals taken for this visit.  There is no height or weight on file to calculate BMI.  Constitutional: Patient is alert, oriented and appears in no acute distress.  Eyes: Eyes grossly normal to inspection, EOMI, no stare, lid lag, or retraction; no conjunctival injection.  ENMT: Lips are without lesions.   Neck: No visible goiter or neck mass.  Respiratory: No audible wheeze or cough. No visible cyanosis. No visible increased work of  breathing.  Neurological: Alert and oriented times 3.  Cranial nerves grossly intact.      DATA REVIEW  Each of the following laboratory and/or imaging studies were reviewed.    ASSESSMENT  1.  Probable papillary thyroid carcinoma.  Based on cytology from FNA of left thyroid lobe nodule.  Neck ultrasound has not shown suspicious lymph nodes.  Planning to move forward with total thyroidectomy with Dr. Sepulveda next month.  He was under the impression that partial thyroidectomy would be surgical plan; we discussed that given papillary thyroid carcinoma on cytology and presence of bilateral nodules, total thyroidectomy would likely be most complete approach.  We did review benefits and potential side effects: He has some concerns about potential for vocal changes after thyroidectomy.  Have encouraged him to reach out to me or his surgeon to discuss further if concerns or questions come up; for now, he is comfortable moving forward with total thyroidectomy.  We discussed postsurgical management of thyroid cancer and the potential need for radioiodine therapy.  We will plan to follow-up 4 to 6 weeks after thyroidectomy to review pathology, reevaluate thyroid function tests and discuss our options for next steps in management.    2.  Hypothyroidism.  Diagnosed in 1/2021.  Likely due to Hashimoto's thyroiditis, had positive antithyroglobulin antibody.  Clinically, appears euthyroid.  Check thyroid function tests with his next oncology lab draw later this month and adjust levothyroxine as needed.  After total thyroidectomy, would estimate levothyroxine requirement to be around 137 mcg daily.    3.  Multiple myeloma.  Following in oncology.  Status post chemotherapy and autologous PBSCT.    4.  High risk for diabetes.  Hemoglobin A1c 6% on 1/25/2021.  Plasma glucose values have overall been acceptable on labs drawn during BMT process while receiving glucocorticoids.  Check A1c with next set of labs.    PLAN  -Continue  levothyroxine 50 mcg daily (dose will need to be adjusted up after thyroid surgery)  -Labs on 4/20/2022 (please ask lab to complete my orders in addition to labs ordered by oncologist)  -We will confirm that thyroidectomy is indeed planned for 5/3/2022  -Follow-up mid to end of June 2022 in person in [place] after thyroidectomy: Lab draw 1 week before visit  -We will communicate results via MyChart, or if needed by phone      Orders Placed This Encounter   Procedures     Hemoglobin A1c     TSH with free T4 reflex         Video-Visit Details    Type of service:  Video Visit    Video Start Time: 11:55 AM  Video End Time: 12:13 PM    I spent a total of 51 minutes on the date of encounter reviewing medical records, evaluating the patient, coordinating care and documenting in the EHR, as detailed above.      Platform used for Video Visit: Adán Elder MD   Division of Diabetes, Endocrinology and Metabolism  Department of Medicine

## 2022-04-05 DIAGNOSIS — Z11.59 ENCOUNTER FOR SCREENING FOR OTHER VIRAL DISEASES: Primary | ICD-10-CM

## 2022-04-08 LAB
CULTURE HARVEST COMPLETE DATE: NORMAL
CULTURE HARVEST COMPLETE DATE: NORMAL
INTERPRETATION: NORMAL
ISCN: NORMAL
METHODS: NORMAL

## 2022-04-12 DIAGNOSIS — C90.00 MULTIPLE MYELOMA NOT HAVING ACHIEVED REMISSION (H): ICD-10-CM

## 2022-04-12 RX ORDER — POTASSIUM CHLORIDE 750 MG/1
20 TABLET, EXTENDED RELEASE ORAL DAILY
Qty: 60 TABLET | Refills: 3 | Status: SHIPPED | OUTPATIENT
Start: 2022-04-12 | End: 2022-09-07

## 2022-04-12 NOTE — TELEPHONE ENCOUNTER
Medication:Potassium Chloride  Last prescribing provider: Deborah Carmona    Last clinic visit date:2/9/22 Dr. Vazquez  Recommendations for requested medication: none specific    Any missed appointments or no-shows since last clinic visit?: none recently  Next clinic visit date: 4/20/22 Dr. Vazquez    Any other pertinent information:  Routed to Dr. Vazquez

## 2022-04-14 ENCOUNTER — TELEPHONE (OUTPATIENT)
Dept: ONCOLOGY | Facility: CLINIC | Age: 52
End: 2022-04-14
Payer: COMMERCIAL

## 2022-04-14 NOTE — TELEPHONE ENCOUNTER
PA Initiation    Medication: Revlimid - APPROVED  Insurance Company: [employer] [phone] Fax [phone]  Pharmacy Filling the Rx:    Filling Pharmacy Phone:    Filling Pharmacy Fax:    Start Date: 4/14/2022    Prior Authorization Approval    Authorization Effective Date: 4/14/2022  Authorization Expiration Date: 4/14/2023  Medication: Revlimid - APPROVED  Approved Dose/Quantity:   Reference #:     Insurance Company: [employer] [phone] Fax [phone]  Expected CoPay:       CoPay Card Available:      Foundation Assistance Needed:    Which Pharmacy is filling the prescription (Not needed for infusion/clinic administered):    Pharmacy Notified:    Patient Notified:          Yu Goldberg CPhT  [facility]  Oncology Pharmacy Liaison  [email or web address]  Phone: [phone]  Fax: [phone]

## 2022-04-18 NOTE — PROGRESS NOTES
[facility]  [address], [address]  [facility] [address]  Phone: [phone]  Primary Provider: Selma Johnson  Pre-op Performing Provider: FEMI MONTEZ      PREOPERATIVE EVALUATION:  Today's date: 4/19/2022    June Ronquillo is a 51 year old male who presents for a preoperative evaluation.    Surgical Information:  Surgery/Procedure: total thyroidectomy central neck dissection  Surgery Location: [facility]   Surgeon: Clarita Sepulveda MD  Surgery Date: 05/03/2022  Time of Surgery: TBD  Where patient plans to recover: At home with family  Fax number for surgical facility: Note does not need to be faxed, will be available electronically in Epic.    Type of Anesthesia Anticipated: General    Assessment & Plan     The proposed surgical procedure is considered INTERMEDIATE risk.    Multiple myeloma not having achieved remission (H)  History of peripheral stem cell transplant (H)  Neutropenia, unspecified type (H)  - considered this problem when making today's plans  - no interventions today       -followed by specialist.  Hypothyroidism due to Hashimoto's thyroiditis  - considered this problem when making today's plans  - no interventions today       -followed by specialist.    Hypertension, controlled/ hyperlipidemia  - considered this problem when making today's plans  - no interventions today       -followed by primary care physicain Selma Johnson  .    S/P angioplasty with stent  - EKG 12-lead complete w/read - Clinics  Seen by cardiology- 10/21  And echo- no acute concerns         Preop general physical exam  Malignant neoplasm of thyroid gland (H)        Risks and Recommendations:  The patient has the following additional risks and recommendations for perioperative complications:   - Consult Hospitalist / IM to assist with post-op medical management    Medication Instructions:  Patient is to take all  scheduled medications on the day of surgery    RECOMMENDATION:  On going chemotherapy  Need for routine blood tests- if no thrombocytopenia  Or chemo therapy complications- than ok to proceed with the proposed surgery      Review of external notes as documented above     [id number]    Subjective     HPI related to upcoming procedure: Known history of multiple myeloma since April 2021.  He is status post autologous bone marrow transplant.  He has also undergoing chemotherapy.  History of acute inferior posterior STEMI in 2004.  Known history of hypertension and hyperlipidemia.    History of thyroid nodule , recent  PET/CT scan showed FDG avid left thyroid nodule most likely representing papillary thyroid carcinoma and thyroidectomy is scheduled for third of 3.  He has routine follow-up appointments with the chemotherapy and oncology and expected to get routine blood work completed tomorrow.    He reports overall in usual state of health no acute chest pain shortness of breath..    Preop Questions 4/19/2022   1. Have you ever had a heart attack or stroke? YES - 2004   2. Have you ever had surgery on your heart or blood vessels, such as a stent placement, a coronary artery bypass, or surgery on an artery in your head, neck, heart, or legs? YES - 2004   3. Do you have chest pain with activity? No   4. Do you have a history of  heart failure? No   5. Do you currently have a cold, bronchitis or symptoms of other infection? No   6. Do you have a cough, shortness of breath, or wheezing? No   7. Do you or anyone in your family have previous history of blood clots? No   8. Do you or does anyone in your family have a serious bleeding problem such as prolonged bleeding following surgeries or cuts? UNKNOWN -   9. Have you ever had problems with anemia or been told to take iron pills? No   10. Have you had any abnormal blood loss such as black, tarry or bloody stools? No   11. Have you ever had a blood transfusion? No   12. Are you  willing to have a blood transfusion if it is medically needed before, during, or after your surgery? Yes   13. Have you or any of your relatives ever had problems with anesthesia? No   14. Do you have sleep apnea, excessive snoring or daytime drowsiness? YES - snoring   14a. Do you have a CPAP machine? No   15. Do you have any artifical heart valves or other implanted medical devices like a pacemaker, defibrillator, or continuous glucose monitor? No   16. Do you have artificial joints? No   17. Are you allergic to latex? No     Health Care Directive:  Patient does not have a Health Care Directive or Living Will: Discussed advance care planning with patient; information given to patient to review.    Preoperative Review of :   reviewed - no record of controlled substances prescribed.      Status of Chronic Conditions:  HYPERLIPIDEMIA - Patient has a long history of significant Hyperlipidemia requiring medication for treatment with recent good control. Patient reports no problems or side effects with the medication.     HYPERTENSION - Patient has longstanding history of HTN , currently denies any symptoms referable to elevated blood pressure. Specifically denies chest pain, palpitations, dyspnea, orthopnea, PND or peripheral edema. Blood pressure readings have been in normal range. Current medication regimen is as listed below. Patient denies any side effects of medication.     HYPOTHYROIDISM - Patient has a longstanding history of chronic Hypothyroidism. Patient has been doing well, noting no tremor, insomnia, hair loss or changes in skin texture. Continues to take medications as directed, without adverse reactions or side effects. Last TSH   Lab Results   Component Value Date    TSH 1.55 11/14/2021   .        Review of Systems  Constitutional, neuro, ENT, endocrine, pulmonary, cardiac, gastrointestinal, genitourinary, musculoskeletal, integument and psychiatric systems are negative, except as otherwise  noted.    Patient Active Problem List    Diagnosis Date Noted     Thrombocytopenia, unspecified (H) 04/19/2022     Priority: Medium     Malignant neoplasm of thyroid gland (H) 03/22/2022     Priority: Medium     Added automatically from request for surgery [id number]       History of peripheral stem cell transplant (H) 02/15/2022     Priority: Medium     Added automatically from request for surgery [id number]       Febrile neutropenia (H) 11/20/2021     Priority: Medium     Neutropenic fever (H) 11/20/2021     Priority: Medium     Autologous donor, stem cells 10/28/2021     Priority: Medium     Multiple myeloma not having achieved remission (H) 05/13/2021     Priority: Medium     Hepatitis A antibody positive 01/27/2021     Priority: Medium     Hypothyroidism due to Hashimoto's thyroiditis 01/27/2021     Priority: Medium     Pre-diabetes 01/25/2021     Priority: Medium     Increased thyroid stimulating hormone (TSH) level 01/25/2021     Priority: Medium     Increased liver enzymes 01/25/2021     Priority: Medium     Hypertension      Priority: Medium     CAD (coronary artery disease)      Priority: Medium     s/p stent to CFX       Concussion without loss of consciousness, initial encounter 05/30/2016     Priority: Medium     Concussion without loss of consciousness, subsequent encounter 05/30/2016     Priority: Medium     Hyperlipidemia LDL goal <100      Priority: Medium     CARDIOVASCULAR SCREENING; LDL GOAL LESS THAN 100 02/22/2012     Priority: Medium     FH: heart disease 02/22/2012     Priority: Medium     Overweight (BMI 25.0-29.9) 02/22/2012     Priority: Medium     Keratoconus, stable condition 02/12/2009     Priority: Medium      Past Medical History:   Diagnosis Date     CAD (coronary artery disease)     s/p stent to CFX     Heart attack (H)      Hyperlipidemia LDL goal <100      Hypertension      Stented coronary artery      Thyroid disease      Past Surgical History:   Procedure Laterality Date     BONE  MARROW BIOPSY, BONE SPECIMEN, NEEDLE/TROCAR Left 5/17/2021    Procedure: BIOPSY, BONE MARROW with aspiration and ancillary studies;  Surgeon: Niharika Regalado MD;  Location: [facility] OR     BONE MARROW BIOPSY, BONE SPECIMEN, NEEDLE/TROCAR Left 10/14/2021    Procedure: BIOPSY, BONE MARROW;  Surgeon: Alexa Albert, PATTY CNP;  Location: [facility] OR     BONE MARROW BIOPSY, BONE SPECIMEN, NEEDLE/TROCAR Right 3/7/2022    Procedure: BIOPSY, BONE MARROW;  Surgeon: Deborah Carmona PA-C;  Location: [facility] OR     HEART CATH PTCA SINGLE VESSEL  10/10/2004    Stent to [place]      INSERT CATHETER VASCULAR ACCESS Right 11/3/2021    Procedure: NON VALVED TUNNELED DUAL LUMEN APHARESIS CAPABLE LINE INSERTION, VASCULAR ACCESS CATHETER;  Surgeon: Werner Mcnamara MD;  Location: [facility] OR     IR CVC TUNNEL PLACEMENT > 5 YRS OF AGE  11/3/2021     IR CVC TUNNEL REMOVAL RIGHT  12/2/2021     Current Outpatient Medications   Medication Sig Dispense Refill     acyclovir (ZOVIRAX) 800 MG tablet Take 1 tablet (800 mg) by mouth 2 times daily 180 tablet 1     aspirin (ASA) 81 MG EC tablet Take 1 tablet (81 mg) by mouth daily       calcium carb-cholecalciferol (OS-HARDEEP) 500-200 MG-UNIT tablet Take 1 tablet by mouth daily 30 tablet 3     dexamethasone (DECADRON) 4 MG tablet Take 1 tablet (4 mg) by mouth daily (Patient not taking: Reported on 4/4/2022) 6 tablet 1     LENalidomide (REVLIMID) 10 MG CAPS capsule Take 1 capsule (10 mg) by mouth daily 28 capsule 0     levothyroxine (SYNTHROID/LEVOTHROID) 50 MCG tablet Take 1 tablet (50 mcg) by mouth daily 90 tablet 1     losartan (COZAAR) 50 MG tablet Take 1 tablet (50 mg) by mouth daily 30 tablet 1     potassium chloride ER (KLOR-CON M) 10 MEQ CR tablet Take 2 tablets (20 mEq) by mouth in the morning. 60 tablet 3     rosuvastatin (CRESTOR) 40 MG tablet 1 tablet daily       sulfamethoxazole-trimethoprim (BACTRIM) 400-80 MG tablet Take 1 tablet by mouth 2 times daily Mondays and Tuesdays  "      vitamin B complex with vitamin C (STRESS TAB) tablet Take 1 tablet by mouth daily 90 tablet 3       Allergies   Allergen Reactions     Blood Transfusion Related (Informational Only) Other (See Comments)     Patient has a history of a clinically significant antibody against RBC antigens.  A delay in compatible RBCs may occur.         Social History     Tobacco Use     Smoking status: Never Smoker     Smokeless tobacco: Never Used   Substance Use Topics     Alcohol use: Not Currently     Family History   Problem Relation Age of Onset     Hypertension Mother      Hyperlipidemia Mother      Heart Disease Paternal Grandmother      Hyperlipidemia Sister      Hyperlipidemia Sister      History   Drug Use No         Objective     /88   Pulse 77   Temp 98  F (36.7  C)   Ht 1.676 m (5' 6\")   Wt 75.3 kg (165 lb 14.4 oz)   SpO2 100%   BMI 26.78 kg/m      Physical Exam    GENERAL APPEARANCE: healthy, alert and no distress     EYES: EOMI,  PERRL     HENT: ear canals and TM's normal and nose and mouth without ulcers or lesions     NECK: no adenopathy, no asymmetry, masses, or scars and thyroid normal to palpation     RESP: lungs clear to auscultation - no rales, rhonchi or wheezes     CV: regular rates and rhythm, normal S1 S2, no S3 or S4 and no murmur, click or rub     ABDOMEN:  soft, nontender, no HSM or masses and bowel sounds normal     MS: extremities normal- no gross deformities noted, no evidence of inflammation in joints, FROM in all extremities.     SKIN: no suspicious lesions or rashes     NEURO: Normal strength and tone, sensory exam grossly normal, mentation intact and speech normal     PSYCH: mentation appears normal. and affect normal/bright     LYMPHATICS: No cervical adenopathy    Recent Labs   Lab Test 03/24/22  1503 03/07/22  0834 11/23/21  1140 11/23/21  0415 04/30/21  1034 01/25/21  1633   HGB 12.1* 12.2*  12.4*   < > 8.7*   < >  --     350  349   < > 28*   < >  --    INR  --  0.96  " --  1.16*   < >  --     141   < > 139   < >  --    POTASSIUM 3.8 3.6   < > 3.1*   < >  --    CR 1.06 0.95   < > 0.69   < >  --    A1C  --   --   --   --   --  6.0*    < > = values in this interval not displayed.        Diagnostics:  Labs pending at this time.  Results will be reviewed when available.   EKG: appears normal, NSR, normal axis, normal intervals, no acute ST/T changes c/w ischemia, unchanged from previous tracings    Revised Cardiac Risk Index (RCRI):  The patient has the following serious cardiovascular risks for perioperative complications:   - High risk surgery (>5% cardiac complication risk) = 1 point     RCRI Interpretation: 1 point: Class II (low risk - 0.9% complication rate)           Signed Electronically by: Chantel Baldwin MD  Copy of this evaluation report is provided to requesting physician.

## 2022-04-18 NOTE — PATIENT INSTRUCTIONS
Preparing for Your Surgery  Getting started  A nurse will call you to review your health history and instructions. They will give you an arrival time based on your scheduled surgery time. Please be ready to share:    Your doctor's clinic name and phone number    Your medical, surgical and anesthesia history    A list of allergies and sensitivities    A list of medicines, including herbal treatments and over-the-counter drugs    Whether the patient has a legal guardian (ask how to send us the papers in advance)  Please tell us if you're pregnant--or if there's any chance you might be pregnant. Some surgeries may injure a fetus (unborn baby), so they require a pregnancy test. Surgeries that are safe for a fetus don't always need a test, and you can choose whether to have one.   If you have a child who's having surgery, please ask for a copy of Preparing for Your Child's Surgery.    Preparing for surgery    Within 30 days of surgery: Have a pre-op exam (sometimes called an H&P, or History and Physical). This can be done at a clinic or pre-operative center.  ? If you're having a , you may not need this exam. Talk to your care team.    At your pre-op exam, talk to your care team about all medicines you take. If you need to stop any medicines before surgery, ask when to start taking them again.  ? We do this for your safety. Many medicines can make you bleed too much during surgery. Some change how well surgery (anesthesia) drugs work.    Call your insurance company to let them know you're having surgery. (If you don't have insurance, call [phone].)    Call your clinic if there's any change in your health. This includes signs of a cold or flu (sore throat, runny nose, cough, rash, fever). It also includes a scrape or scratch near the surgery site.    If you have questions on the day of surgery, call your hospital or surgery center.  COVID testing  You may need to be tested for COVID-19 before having  surgery. If so, your surgical team will give you instructions for scheduling this test, separate from your preoperative history and physical.  Eating and drinking guidelines  For your safety: Unless your surgeon tells you otherwise, follow the guidelines below.    Eat and drink as usual until 8 hours before surgery. After that, no food or milk.    Drink clear liquids until 2 hours before surgery. These are liquids you can see through, like water, Gatorade and Propel Water. You may also have black coffee and tea (no cream or milk).    Nothing by mouth within 2 hours of surgery. This includes gum, candy and breath mints.    If you drink alcohol: Stop drinking it the night before surgery.    If your care team tells you to take medicine on the morning of surgery, it's okay to take it with a sip of water.  Preventing infection    Shower or bathe the night before and morning of your surgery. Follow the instructions your clinic gave you. (If no instructions, use regular soap.)    Don't shave or clip hair near your surgery site. We'll remove the hair if needed.    Don't smoke or vape the morning of surgery. You may chew nicotine gum up to 2 hours before surgery. A nicotine patch is okay.  ? Note: Some surgeries require you to completely quit smoking and nicotine. Check with your surgeon.    Your care team will make every effort to keep you safe from infection. We will:  ? Clean our hands often with soap and water (or an alcohol-based hand rub).  ? Clean the skin at your surgery site with a special soap that kills germs.  ? Give you a special gown to keep you warm. (Cold raises the risk of infection.)  ? Wear special hair covers, masks, gowns and gloves during surgery.  ? Give antibiotic medicine, if prescribed. Not all surgeries need antibiotics.  What to bring on the day of surgery    Photo ID and insurance card    Copy of your health care directive, if you have one    Glasses and hearing aides (bring cases)  ? You can't  wear contacts during surgery    Inhaler and eye drops, if you use them (tell us about these when you arrive)    CPAP machine or breathing device, if you use them    A few personal items, if spending the night    If you have . . .  ? A pacemaker, ICD (cardiac defibrillator) or other implant: Bring the ID card.  ? An implanted stimulator: Bring the remote control.  ? A legal guardian: Bring a copy of the certified (court-stamped) guardianship papers.  Please remove any jewelry, including body piercings. Leave jewelry and other valuables at home.  If you're going home the day of surgery    You must have a responsible adult drive you home. They should stay with you overnight as well.    If you don't have someone to stay with you, and you aren't safe to go home alone, we may keep you overnight. Insurance often won't pay for this.  After surgery  If it's hard to control your pain or you need more pain medicine, please call your surgeon's office.  Questions?   If you have any questions for your care team, list them here: _________________________________________________________________________________________________________________________________________________________________________ ____________________________________ ____________________________________ ____________________________________  For informational purposes only. Not to replace the advice of your health care provider. Copyright   2003, 2019 [facility]. All rights reserved. Clinically reviewed by Elba You MD. [employer] [id number] - REV 07/21.

## 2022-04-19 ENCOUNTER — OFFICE VISIT (OUTPATIENT)
Dept: FAMILY MEDICINE | Facility: CLINIC | Age: 52
End: 2022-04-19
Payer: COMMERCIAL

## 2022-04-19 VITALS
WEIGHT: 165.9 LBS | OXYGEN SATURATION: 100 % | HEIGHT: 66 IN | TEMPERATURE: 98 F | BODY MASS INDEX: 26.66 KG/M2 | SYSTOLIC BLOOD PRESSURE: 138 MMHG | HEART RATE: 77 BPM | DIASTOLIC BLOOD PRESSURE: 88 MMHG

## 2022-04-19 DIAGNOSIS — Z01.818 PREOP GENERAL PHYSICAL EXAM: Primary | ICD-10-CM

## 2022-04-19 DIAGNOSIS — E06.3 HYPOTHYROIDISM DUE TO HASHIMOTO'S THYROIDITIS: ICD-10-CM

## 2022-04-19 DIAGNOSIS — C73 MALIGNANT NEOPLASM OF THYROID GLAND (H): ICD-10-CM

## 2022-04-19 DIAGNOSIS — D69.6 THROMBOCYTOPENIA, UNSPECIFIED (H): ICD-10-CM

## 2022-04-19 DIAGNOSIS — C90.00 MULTIPLE MYELOMA NOT HAVING ACHIEVED REMISSION (H): ICD-10-CM

## 2022-04-19 DIAGNOSIS — Z94.84 HISTORY OF PERIPHERAL STEM CELL TRANSPLANT (H): ICD-10-CM

## 2022-04-19 DIAGNOSIS — Z95.820 S/P ANGIOPLASTY WITH STENT: ICD-10-CM

## 2022-04-19 DIAGNOSIS — D70.9 NEUTROPENIA, UNSPECIFIED TYPE (H): ICD-10-CM

## 2022-04-19 PROCEDURE — 93000 ELECTROCARDIOGRAM COMPLETE: CPT | Performed by: FAMILY MEDICINE

## 2022-04-19 PROCEDURE — 99214 OFFICE O/P EST MOD 30 MIN: CPT | Performed by: FAMILY MEDICINE

## 2022-04-20 ENCOUNTER — APPOINTMENT (OUTPATIENT)
Dept: LAB | Facility: CLINIC | Age: 52
End: 2022-04-20
Attending: STUDENT IN AN ORGANIZED HEALTH CARE EDUCATION/TRAINING PROGRAM
Payer: COMMERCIAL

## 2022-04-20 DIAGNOSIS — C90.00 MULTIPLE MYELOMA NOT HAVING ACHIEVED REMISSION (H): Primary | ICD-10-CM

## 2022-04-20 RX ORDER — DIPHENHYDRAMINE HCL 25 MG
50 CAPSULE ORAL ONCE
Status: CANCELLED
Start: 2022-04-20 | End: 2022-04-20

## 2022-04-20 RX ORDER — MEPERIDINE HYDROCHLORIDE 25 MG/ML
25 INJECTION INTRAMUSCULAR; INTRAVENOUS; SUBCUTANEOUS EVERY 30 MIN PRN
Status: CANCELLED | OUTPATIENT
Start: 2022-05-16

## 2022-04-20 RX ORDER — DEXAMETHASONE 4 MG/1
12 TABLET ORAL ONCE
Status: CANCELLED
Start: 2022-04-20 | End: 2022-04-20

## 2022-04-20 RX ORDER — DIPHENHYDRAMINE HYDROCHLORIDE 50 MG/ML
50 INJECTION INTRAMUSCULAR; INTRAVENOUS
Status: CANCELLED
Start: 2022-04-20

## 2022-04-20 RX ORDER — MEPERIDINE HYDROCHLORIDE 25 MG/ML
25 INJECTION INTRAMUSCULAR; INTRAVENOUS; SUBCUTANEOUS EVERY 30 MIN PRN
Status: CANCELLED | OUTPATIENT
Start: 2022-04-20

## 2022-04-20 RX ORDER — NALOXONE HYDROCHLORIDE 0.4 MG/ML
0.2 INJECTION, SOLUTION INTRAMUSCULAR; INTRAVENOUS; SUBCUTANEOUS
Status: CANCELLED | OUTPATIENT
Start: 2022-05-16

## 2022-04-20 RX ORDER — ALBUTEROL SULFATE 0.83 MG/ML
2.5 SOLUTION RESPIRATORY (INHALATION)
Status: CANCELLED | OUTPATIENT
Start: 2022-04-20

## 2022-04-20 RX ORDER — ALBUTEROL SULFATE 90 UG/1
1-2 AEROSOL, METERED RESPIRATORY (INHALATION)
Status: CANCELLED
Start: 2022-05-16

## 2022-04-20 RX ORDER — ALBUTEROL SULFATE 90 UG/1
1-2 AEROSOL, METERED RESPIRATORY (INHALATION)
Status: CANCELLED
Start: 2022-04-20

## 2022-04-20 RX ORDER — ACETAMINOPHEN 325 MG/1
650 TABLET ORAL ONCE
Status: CANCELLED
Start: 2022-04-20 | End: 2022-04-20

## 2022-04-20 RX ORDER — EPINEPHRINE 1 MG/ML
0.3 INJECTION, SOLUTION INTRAMUSCULAR; SUBCUTANEOUS EVERY 5 MIN PRN
Status: CANCELLED | OUTPATIENT
Start: 2022-05-16

## 2022-04-20 RX ORDER — NALOXONE HYDROCHLORIDE 0.4 MG/ML
0.2 INJECTION, SOLUTION INTRAMUSCULAR; INTRAVENOUS; SUBCUTANEOUS
Status: CANCELLED | OUTPATIENT
Start: 2022-04-20

## 2022-04-20 RX ORDER — METHYLPREDNISOLONE SODIUM SUCCINATE 125 MG/2ML
125 INJECTION, POWDER, LYOPHILIZED, FOR SOLUTION INTRAMUSCULAR; INTRAVENOUS
Status: CANCELLED
Start: 2022-04-20

## 2022-04-20 RX ORDER — DEXAMETHASONE 4 MG/1
12 TABLET ORAL ONCE
Status: CANCELLED
Start: 2022-05-16 | End: 2022-05-16

## 2022-04-20 RX ORDER — DIPHENHYDRAMINE HYDROCHLORIDE 50 MG/ML
50 INJECTION INTRAMUSCULAR; INTRAVENOUS
Status: CANCELLED
Start: 2022-05-16

## 2022-04-20 RX ORDER — EPINEPHRINE 1 MG/ML
0.3 INJECTION, SOLUTION INTRAMUSCULAR; SUBCUTANEOUS EVERY 5 MIN PRN
Status: CANCELLED | OUTPATIENT
Start: 2022-04-20

## 2022-04-20 RX ORDER — METHYLPREDNISOLONE SODIUM SUCCINATE 125 MG/2ML
125 INJECTION, POWDER, LYOPHILIZED, FOR SOLUTION INTRAMUSCULAR; INTRAVENOUS
Status: CANCELLED
Start: 2022-05-16

## 2022-04-20 RX ORDER — DIPHENHYDRAMINE HCL 25 MG
50 CAPSULE ORAL ONCE
Status: CANCELLED
Start: 2022-05-16 | End: 2022-05-16

## 2022-04-20 RX ORDER — MONTELUKAST SODIUM 10 MG/1
10 TABLET ORAL ONCE
Status: CANCELLED
Start: 2022-05-16 | End: 2022-05-16

## 2022-04-20 RX ORDER — MONTELUKAST SODIUM 10 MG/1
10 TABLET ORAL ONCE
Status: CANCELLED
Start: 2022-04-20 | End: 2022-04-20

## 2022-04-20 RX ORDER — ALBUTEROL SULFATE 0.83 MG/ML
2.5 SOLUTION RESPIRATORY (INHALATION)
Status: CANCELLED | OUTPATIENT
Start: 2022-05-16

## 2022-04-20 RX ORDER — ACETAMINOPHEN 325 MG/1
650 TABLET ORAL ONCE
Status: CANCELLED
Start: 2022-05-16 | End: 2022-05-16

## 2022-04-21 RX ORDER — DEXAMETHASONE 4 MG/1
20 TABLET ORAL ONCE
Status: CANCELLED
Start: 2022-04-21 | End: 2022-04-21

## 2022-04-22 ENCOUNTER — APPOINTMENT (OUTPATIENT)
Dept: LAB | Facility: CLINIC | Age: 52
End: 2022-04-22
Attending: STUDENT IN AN ORGANIZED HEALTH CARE EDUCATION/TRAINING PROGRAM
Payer: COMMERCIAL

## 2022-04-22 ENCOUNTER — INFUSION THERAPY VISIT (OUTPATIENT)
Dept: ONCOLOGY | Facility: CLINIC | Age: 52
End: 2022-04-22
Attending: STUDENT IN AN ORGANIZED HEALTH CARE EDUCATION/TRAINING PROGRAM
Payer: COMMERCIAL

## 2022-04-22 VITALS
HEART RATE: 86 BPM | WEIGHT: 164.3 LBS | SYSTOLIC BLOOD PRESSURE: 133 MMHG | RESPIRATION RATE: 16 BRPM | BODY MASS INDEX: 26.52 KG/M2 | DIASTOLIC BLOOD PRESSURE: 81 MMHG | TEMPERATURE: 97 F | OXYGEN SATURATION: 100 %

## 2022-04-22 DIAGNOSIS — C90.00 MULTIPLE MYELOMA NOT HAVING ACHIEVED REMISSION (H): Primary | ICD-10-CM

## 2022-04-22 LAB
ALBUMIN SERPL-MCNC: 3.9 G/DL (ref 3.4–5)
ALP SERPL-CCNC: 54 U/L (ref 40–150)
ALT SERPL W P-5'-P-CCNC: 29 U/L (ref 0–70)
ANION GAP SERPL CALCULATED.3IONS-SCNC: 6 MMOL/L (ref 3–14)
AST SERPL W P-5'-P-CCNC: 29 U/L (ref 0–45)
BASOPHILS # BLD AUTO: 0 10E3/UL (ref 0–0.2)
BASOPHILS NFR BLD AUTO: 1 %
BILIRUB SERPL-MCNC: 0.3 MG/DL (ref 0.2–1.3)
BUN SERPL-MCNC: 16 MG/DL (ref 7–30)
CALCIUM SERPL-MCNC: 8.8 MG/DL (ref 8.5–10.1)
CHLORIDE BLD-SCNC: 109 MMOL/L (ref 94–109)
CO2 SERPL-SCNC: 25 MMOL/L (ref 20–32)
CREAT SERPL-MCNC: 1.02 MG/DL (ref 0.66–1.25)
EOSINOPHIL # BLD AUTO: 0.6 10E3/UL (ref 0–0.7)
EOSINOPHIL NFR BLD AUTO: 11 %
ERYTHROCYTE [DISTWIDTH] IN BLOOD BY AUTOMATED COUNT: 14.8 % (ref 10–15)
GFR SERPL CREATININE-BSD FRML MDRD: 88 ML/MIN/1.73M2
GLUCOSE BLD-MCNC: 117 MG/DL (ref 70–99)
HCT VFR BLD AUTO: 37.9 % (ref 40–53)
HGB BLD-MCNC: 12.4 G/DL (ref 13.3–17.7)
IGA SERPL-MCNC: 35 MG/DL (ref 84–499)
IGG SERPL-MCNC: 344 MG/DL (ref 610–1616)
IGM SERPL-MCNC: 20 MG/DL (ref 35–242)
IMM GRANULOCYTES # BLD: 0 10E3/UL
IMM GRANULOCYTES NFR BLD: 0 %
KAPPA LC FREE SER-MCNC: 0.59 MG/DL (ref 0.33–1.94)
KAPPA LC FREE/LAMBDA FREE SER NEPH: 1.31 {RATIO} (ref 0.26–1.65)
LAMBDA LC FREE SERPL-MCNC: 0.45 MG/DL (ref 0.57–2.63)
LYMPHOCYTES # BLD AUTO: 2.1 10E3/UL (ref 0.8–5.3)
LYMPHOCYTES NFR BLD AUTO: 37 %
MCH RBC QN AUTO: 29.8 PG (ref 26.5–33)
MCHC RBC AUTO-ENTMCNC: 32.7 G/DL (ref 31.5–36.5)
MCV RBC AUTO: 91 FL (ref 78–100)
MONOCYTES # BLD AUTO: 1 10E3/UL (ref 0–1.3)
MONOCYTES NFR BLD AUTO: 18 %
NEUTROPHILS # BLD AUTO: 1.9 10E3/UL (ref 1.6–8.3)
NEUTROPHILS NFR BLD AUTO: 33 %
NRBC # BLD AUTO: 0 10E3/UL
NRBC BLD AUTO-RTO: 0 /100
PLATELET # BLD AUTO: 351 10E3/UL (ref 150–450)
POTASSIUM BLD-SCNC: 4.3 MMOL/L (ref 3.4–5.3)
PROT SERPL-MCNC: 6.5 G/DL (ref 6.8–8.8)
RBC # BLD AUTO: 4.16 10E6/UL (ref 4.4–5.9)
SODIUM SERPL-SCNC: 140 MMOL/L (ref 133–144)
TOTAL PROTEIN SERUM FOR ELP: 6.3 G/DL (ref 6.8–8.8)
WBC # BLD AUTO: 5.7 10E3/UL (ref 4–11)

## 2022-04-22 PROCEDURE — 83521 IG LIGHT CHAINS FREE EACH: CPT

## 2022-04-22 PROCEDURE — 80053 COMPREHEN METABOLIC PANEL: CPT

## 2022-04-22 PROCEDURE — 84155 ASSAY OF PROTEIN SERUM: CPT

## 2022-04-22 PROCEDURE — 85025 COMPLETE CBC W/AUTO DIFF WBC: CPT

## 2022-04-22 PROCEDURE — 96401 CHEMO ANTI-NEOPL SQ/IM: CPT

## 2022-04-22 PROCEDURE — 84165 PROTEIN E-PHORESIS SERUM: CPT | Mod: 26

## 2022-04-22 PROCEDURE — 36415 COLL VENOUS BLD VENIPUNCTURE: CPT

## 2022-04-22 PROCEDURE — 86334 IMMUNOFIX E-PHORESIS SERUM: CPT | Mod: 26

## 2022-04-22 PROCEDURE — 84165 PROTEIN E-PHORESIS SERUM: CPT | Mod: TC | Performed by: PATHOLOGY

## 2022-04-22 PROCEDURE — 96365 THER/PROPH/DIAG IV INF INIT: CPT

## 2022-04-22 PROCEDURE — 258N000003 HC RX IP 258 OP 636: Performed by: STUDENT IN AN ORGANIZED HEALTH CARE EDUCATION/TRAINING PROGRAM

## 2022-04-22 PROCEDURE — 86334 IMMUNOFIX E-PHORESIS SERUM: CPT | Performed by: PATHOLOGY

## 2022-04-22 PROCEDURE — 82784 ASSAY IGA/IGD/IGG/IGM EACH: CPT

## 2022-04-22 PROCEDURE — 250N000013 HC RX MED GY IP 250 OP 250 PS 637: Performed by: STUDENT IN AN ORGANIZED HEALTH CARE EDUCATION/TRAINING PROGRAM

## 2022-04-22 PROCEDURE — 250N000011 HC RX IP 250 OP 636: Mod: JW | Performed by: STUDENT IN AN ORGANIZED HEALTH CARE EDUCATION/TRAINING PROGRAM

## 2022-04-22 RX ORDER — DEXAMETHASONE 4 MG/1
20 TABLET ORAL ONCE
Status: COMPLETED | OUTPATIENT
Start: 2022-04-22 | End: 2022-04-22

## 2022-04-22 RX ORDER — DIPHENHYDRAMINE HCL 25 MG
50 CAPSULE ORAL ONCE
Status: COMPLETED | OUTPATIENT
Start: 2022-04-22 | End: 2022-04-22

## 2022-04-22 RX ORDER — ACETAMINOPHEN 325 MG/1
650 TABLET ORAL ONCE
Status: COMPLETED | OUTPATIENT
Start: 2022-04-22 | End: 2022-04-22

## 2022-04-22 RX ORDER — MONTELUKAST SODIUM 10 MG/1
10 TABLET ORAL ONCE
Status: COMPLETED | OUTPATIENT
Start: 2022-04-22 | End: 2022-04-22

## 2022-04-22 RX ADMIN — DIPHENHYDRAMINE HYDROCHLORIDE 50 MG: 25 CAPSULE ORAL at 07:32

## 2022-04-22 RX ADMIN — DARATUMUMAB AND HYALURONIDASE-FIHJ (HUMAN RECOMBINANT) 1800 MG: 1800; 30000 INJECTION SUBCUTANEOUS at 08:18

## 2022-04-22 RX ADMIN — SODIUM CHLORIDE 250 ML: 9 INJECTION, SOLUTION INTRAVENOUS at 08:10

## 2022-04-22 RX ADMIN — ACETAMINOPHEN 650 MG: 325 TABLET ORAL at 07:33

## 2022-04-22 RX ADMIN — DEXAMETHASONE 20 MG: 4 TABLET ORAL at 07:33

## 2022-04-22 RX ADMIN — MONTELUKAST 10 MG: 10 TABLET, FILM COATED ORAL at 07:32

## 2022-04-22 RX ADMIN — ZOLEDRONIC ACID 3 MG: 4 INJECTION, SOLUTION, CONCENTRATE INTRAVENOUS at 08:10

## 2022-04-22 ASSESSMENT — PAIN SCALES - GENERAL: PAINLEVEL: NO PAIN (0)

## 2022-04-22 NOTE — PROGRESS NOTES
Infusion Nursing Note:  June Ronquillo presents today for Cycle 4, Day 1 Darzalex Faspro injection, Zometa infusion.    Patient seen by provider today: No   present during visit today: Not Applicable.    Note: Pt assessed upon arrival to infusion suite. Denies fever, chills, cough, SOB or other signs/symptoms of infection. No new issues or concerns to report.      Intravenous Access:  Peripheral IV placed.    Treatment Conditions:  Lab Results   Component Value Date    HGB 12.4 (L) 04/22/2022    WBC 5.7 04/22/2022    ANEU 7.4 11/29/2021    ANEUTAUTO 1.9 04/22/2022     04/22/2022      Lab Results   Component Value Date     04/22/2022    POTASSIUM 4.3 04/22/2022    MAG 1.9 03/07/2022    CR 1.02 04/22/2022    HARDEEP 8.8 04/22/2022    BILITOTAL 0.3 04/22/2022    ALBUMIN 3.9 04/22/2022    ALT 29 04/22/2022    AST 29 04/22/2022     Results reviewed, labs MET treatment parameters, ok to proceed with treatment.      Post Infusion Assessment:  Patient tolerated infusion without incident.  Patient tolerated Darzalex injection to left lower abdomen without incident.  Blood return noted pre and post infusion.  Site patent and intact, free from redness, edema or discomfort.  No evidence of extravasations.  Access discontinued per protocol.       Discharge Plan:   Prescription refills given for Dexamethasone.  AVS to patient via [employer].  Patient will return in 4 weeks for next appointment. Scheduling working on this.   Patient discharged in stable condition accompanied by: self.  Departure Mode: Ambulatory.      Ema Bowden RN

## 2022-04-22 NOTE — NURSING NOTE
Chief Complaint   Patient presents with     Blood Draw     Labs drawn via piv by rn in lab. VS taken.     Labs drawn from PIV placed by RN. Line flushed with saline. Vitals taken. Pt checked in for appointment(s).    kSinny Jensen RN

## 2022-04-25 LAB
ALBUMIN SERPL ELPH-MCNC: 4.4 G/DL (ref 3.7–5.1)
ALPHA1 GLOB SERPL ELPH-MCNC: 0.3 G/DL (ref 0.2–0.4)
ALPHA2 GLOB SERPL ELPH-MCNC: 0.6 G/DL (ref 0.5–0.9)
B-GLOBULIN SERPL ELPH-MCNC: 0.7 G/DL (ref 0.6–1)
GAMMA GLOB SERPL ELPH-MCNC: 0.3 G/DL (ref 0.7–1.6)
M PROTEIN SERPL ELPH-MCNC: 0.1 G/DL
PROT PATTERN SERPL ELPH-IMP: ABNORMAL
PROT PATTERN SERPL IFE-IMP: NORMAL

## 2022-04-26 ENCOUNTER — VIRTUAL VISIT (OUTPATIENT)
Dept: ONCOLOGY | Facility: CLINIC | Age: 52
End: 2022-04-26
Attending: REGISTERED NURSE
Payer: COMMERCIAL

## 2022-04-26 ENCOUNTER — TELEPHONE (OUTPATIENT)
Dept: ONCOLOGY | Facility: CLINIC | Age: 52
End: 2022-04-26
Payer: COMMERCIAL

## 2022-04-26 DIAGNOSIS — C90.00 MULTIPLE MYELOMA NOT HAVING ACHIEVED REMISSION (H): ICD-10-CM

## 2022-04-26 DIAGNOSIS — C90.00 MULTIPLE MYELOMA NOT HAVING ACHIEVED REMISSION (H): Primary | ICD-10-CM

## 2022-04-26 PROCEDURE — 99214 OFFICE O/P EST MOD 30 MIN: CPT | Mod: 95 | Performed by: REGISTERED NURSE

## 2022-04-26 RX ORDER — DEXAMETHASONE 4 MG/1
20 TABLET ORAL ONCE
Status: CANCELLED
Start: 2022-04-26 | End: 2022-04-26

## 2022-04-26 RX ORDER — LENALIDOMIDE 10 MG/1
10 CAPSULE ORAL DAILY
Qty: 28 CAPSULE | Refills: 0 | Status: SHIPPED | OUTPATIENT
Start: 2022-04-26 | End: 2022-04-29

## 2022-04-26 RX ORDER — LENALIDOMIDE 10 MG/1
10 CAPSULE ORAL DAILY
Qty: 28 CAPSULE | Refills: 0 | OUTPATIENT
Start: 2022-04-26

## 2022-04-26 NOTE — H&P (VIEW-ONLY)
June is a 51 year old who is being evaluated via a billable video visit.      If the video visit is dropped, the invitation should be resent by: Send to e-mail at: [email or web address]  Will anyone else be joining your video visit? No      Video Start Time: 3:14 PM  Video-Visit Details    Type of service:  Video Visit    Video End Time:3:31 PM    Originating Location (pt. Location): Home    Distant Location (provider location):  [facility]     Platform used for Video Visit: [facility]    Oncologic Hx:   - 4/30/2021 M spike of 34.8 in urine.M spike in blood 0.2; IgG 702 with depressed IgA and IgM. Beta 2 microglobulin 2.7. Lambda  with K/L ratio of 0.01. WBC 11.1 with absolute eos of 1.0. hgb, plt, Cr (1.1), and albumin WNL.    -4/30/2021 CT abd/pelvis showed sclerotic marrow changes with numerous lytic appearing lesions throughout the imaged portions of the spine, pelvis and ribs.      -PET scan 5/11/2021 Numerous osteolytic lesions which predominantly demonstrate uptake below liver background levels. There is one intraosseous lesion in the left lateral 9th rib that demonstrates uptake above background, possibly due to nondisplaced fracture. Adjacent to this lesion is mild  hypermetabolism to the left pleura, with new small left pleural effusion, suspicious for malignant effusion versus posttraumatic effusion. Pleural uptake may represent extramedullary myeloma extending along the intercostal space versus inflammation.    - BM bx 5/17/2021 with flow showing 4.0% plasma cells which express CD19 (dim), CD38 (bright), CD45 (dim), , and monotypic cytoplasmic lambda immunoglobulin light chains but lack CD20 and CD56.  Hypercellular bone marrow for age (approximately 75% cellularity) with adequate trilineage hematopoiesis. Plasma cell infiltrate: Interstitial, lambda monotypic and representing approximately 60% the bone marrow cellularity, by  immunohistochemical stain.  "Cytpgenetics with 2 related hypodiploid clones. One with loss of Y, monosomy 13 and 14 (5%) and one with translocation of 8p and 14, derivative 16 with long arm replaced by extra copy 1q,monosomy 21. FISH showed gain of 1q, loss of 13 and 14, IGH-MYC fusion t(8:14)    - Started Miracle-RVd 21 day with velcade on days 1,8,15.     -Cycle 2 Miracle-RVd. Dose reduce dex to 80 mg d/t fatigue and stomach upset on dex days. Also having cough with basilar streaking on CXR    - 8/31/2021 BM bx -rare suspicious plasma cells in touch imprint. No increase in plasma cells by morph.    -8/31/2021 PET/CT Diffuse osteolytic lesions throughout the axial and appendicular skeleton. Increased sclerosis since prior exam 5/11/2021 without increased metabolism or size.  Hypermetabolic pretracheal lymph node as well as hypermetabolic level 2 lymph nodes within the right neck. These lymph nodes are likely reactive from autoimmune activation via medical therapy. Hypermetabolic subcentimeter nodules within the thyroid gland    - 11/11/2021 Autologous BMT    - 2/21/22 started maintenance with Revlimid 10 mg daily and daratumumab monthly; did Revlimid alone for C1 due to low IgG levels, added daratumumab starting C2    Interval Hx:   He feels well overall.  Feeling nervous about his thyroid surgery. He is tolerating therapy well other than the days he takes steroids (he has been taking dexamethasone daily for 3 days following each daratumumab infusion). Neuropathy is still present but is \"very mild.\"     Patient denies fevers, chills, night sweats, unexplained weight changes, headaches, dizziness, vision or hearing changes, new lumps or bumps, chest pain, shortness of breath, cough, abdominal pain, nausea, vomiting, changes to bowel or bladder, swelling of extremities, bleeding issues, or rash.      A comprehensive review of systems was completed and negative except as described above.       Video physical exam  General: Patient appears well in no " acute distress.   Skin: No visualized rash or lesions on visualized skin  Eyes: EOMI, no erythema, sclera icterus or discharge noted  Resp: Appears to be breathing comfortably without accessory muscle usage, speaking in full sentences, no cough  MSK: Appears to have normal range of motion based on visualized movements  Neurologic: No apparent tremors, facial movements symmetric  Psych: affect bright, alert and oriented    The rest of a comprehensive physical examination is deferred due to PHE (public health emergency) video restrictions        Labs:  I personally reviewed the following labs:    Most Recent 3 CBC's:Recent Labs   Lab Test 04/22/22  0643 03/24/22  1503 03/07/22  0834   WBC 5.7 7.3 7.6  7.7   HGB 12.4* 12.1* 12.2*  12.4*   MCV 91 95 93  92    411 350  349     Most Recent 3 BMP's:  Recent Labs   Lab Test 04/22/22  0643 03/24/22  1503 03/07/22  0834    139 141   POTASSIUM 4.3 3.8 3.6   CHLORIDE 109 109 109   CO2 25 23 22   BUN 16 12 17   CR 1.02 1.06 0.95   ANIONGAP 6 7 10   HARDEEP 8.8 8.6 8.8   * 99 106*     Most Recent 2 LFT's:  Recent Labs   Lab Test 04/22/22  0643 03/24/22  1503   AST 29 18   ALT 29 19   ALKPHOS 54 55   BILITOTAL 0.3 0.2      Latest Reference Range & Units 04/22/22 06:43   Albumin Fraction 3.7 - 5.1 g/dL 4.4   Alpha 1 Fraction 0.2 - 0.4 g/dL 0.3   Alpha 2 Fraction 0.5 - 0.9 g/dL 0.6   Beta Fraction 0.6 - 1.0 g/dL 0.7   ELP Interpretation:  Small monoclonal protein (0.1 g/dL) seen in the gamma fraction. See immunofixation report on same specimen. Marked hypogammaglobulinemia. Pathologic significance requires clinical correlation. Rizwan Ace M.D., Ph.D., Pathologist.   Gamma Fraction 0.7 - 1.6 g/dL 0.3 (L)   IGA 84 - 499 mg/dL 35 (L)    - 1,616 mg/dL 344 (L)   IGM 35 - 242 mg/dL 20 (L)   Immunofixation ELP  Monoclonal IgG immunoglobulin of kappa light chain type. Monoclonal antibody therapeutics (e.g. Daratumumab) may appear as monoclonal proteins on  serum electrophoresis and immunofixation. Results should be interpreted with caution if the patient is receiving monoclonal antibody therapy. Pathologic significance requires clinical correlation. Rizwan Ace M.D., Ph.D., Pathologist   Kappa Free Lt Chain 0.33 - 1.94 mg/dL 0.59   Kappa Lambda Ratio 0.26 - 1.65  1.31   Lambda Free Lt Chain 0.57 - 2.63 mg/dL 0.45 (L)   Monoclonal Peak <=0.0 g/dL 0.1 (H)   Total Protein Serum for ELP 6.8 - 8.8 g/dL 6.3 (L)   (L): Data is abnormally low  (H): Data is abnormally high    Assessment and Plan  Logan has multiple myeloma with high risk cytogenetics. He got Miracle-RVD with VGPR, then underwent autologous transplant 11/11/21. Continue acyclovir daily for viral ppx and bactrim M/T for PJP ppx. Given his high risk cytogenetics he started miracle + revlimid maintenance therapy 2/21/22. Follow IgG and replete if it remains <300. He had severe flu-like effects from his first dose of Zometa but he has been tolerating monthly Zometa dosed at 3 mg. Continue Vit D. Continue B vitamin supplement to help his neuropathy     Clarified with Dr. Vazquez that he only needs one dose of dexamethasone on the days he is receiving daratumumab.  He does not need daily dosing on the subsequent days.    40 minutes spent on the date of the encounter doing chart review, review of test results, patient visit and documentation     PATTY Solis [facility] [address] [address] [phone]

## 2022-04-26 NOTE — PROGRESS NOTES
June is a 51 year old who is being evaluated via a billable video visit.      If the video visit is dropped, the invitation should be resent by: Send to e-mail at: [email or web address]  Will anyone else be joining your video visit? No      Video Start Time: 3:14 PM  Video-Visit Details    Type of service:  Video Visit    Video End Time:3:31 PM    Originating Location (pt. Location): Home    Distant Location (provider location):  [facility]     Platform used for Video Visit: [facility]    Oncologic Hx:   - 4/30/2021 M spike of 34.8 in urine.M spike in blood 0.2; IgG 702 with depressed IgA and IgM. Beta 2 microglobulin 2.7. Lambda  with K/L ratio of 0.01. WBC 11.1 with absolute eos of 1.0. hgb, plt, Cr (1.1), and albumin WNL.    -4/30/2021 CT abd/pelvis showed sclerotic marrow changes with numerous lytic appearing lesions throughout the imaged portions of the spine, pelvis and ribs.      -PET scan 5/11/2021 Numerous osteolytic lesions which predominantly demonstrate uptake below liver background levels. There is one intraosseous lesion in the left lateral 9th rib that demonstrates uptake above background, possibly due to nondisplaced fracture. Adjacent to this lesion is mild  hypermetabolism to the left pleura, with new small left pleural effusion, suspicious for malignant effusion versus posttraumatic effusion. Pleural uptake may represent extramedullary myeloma extending along the intercostal space versus inflammation.    - BM bx 5/17/2021 with flow showing 4.0% plasma cells which express CD19 (dim), CD38 (bright), CD45 (dim), , and monotypic cytoplasmic lambda immunoglobulin light chains but lack CD20 and CD56.  Hypercellular bone marrow for age (approximately 75% cellularity) with adequate trilineage hematopoiesis. Plasma cell infiltrate: Interstitial, lambda monotypic and representing approximately 60% the bone marrow cellularity, by  immunohistochemical stain.  "Cytpgenetics with 2 related hypodiploid clones. One with loss of Y, monosomy 13 and 14 (5%) and one with translocation of 8p and 14, derivative 16 with long arm replaced by extra copy 1q,monosomy 21. FISH showed gain of 1q, loss of 13 and 14, IGH-MYC fusion t(8:14)    - Started Miracle-RVd 21 day with velcade on days 1,8,15.     -Cycle 2 Miracle-RVd. Dose reduce dex to 80 mg d/t fatigue and stomach upset on dex days. Also having cough with basilar streaking on CXR    - 8/31/2021 BM bx -rare suspicious plasma cells in touch imprint. No increase in plasma cells by morph.    -8/31/2021 PET/CT Diffuse osteolytic lesions throughout the axial and appendicular skeleton. Increased sclerosis since prior exam 5/11/2021 without increased metabolism or size.  Hypermetabolic pretracheal lymph node as well as hypermetabolic level 2 lymph nodes within the right neck. These lymph nodes are likely reactive from autoimmune activation via medical therapy. Hypermetabolic subcentimeter nodules within the thyroid gland    - 11/11/2021 Autologous BMT    - 2/21/22 started maintenance with Revlimid 10 mg daily and daratumumab monthly; did Revlimid alone for C1 due to low IgG levels, added daratumumab starting C2    Interval Hx:   He feels well overall.  Feeling nervous about his thyroid surgery. He is tolerating therapy well other than the days he takes steroids (he has been taking dexamethasone daily for 3 days following each daratumumab infusion). Neuropathy is still present but is \"very mild.\"     Patient denies fevers, chills, night sweats, unexplained weight changes, headaches, dizziness, vision or hearing changes, new lumps or bumps, chest pain, shortness of breath, cough, abdominal pain, nausea, vomiting, changes to bowel or bladder, swelling of extremities, bleeding issues, or rash.      A comprehensive review of systems was completed and negative except as described above.       Video physical exam  General: Patient appears well in no " acute distress.   Skin: No visualized rash or lesions on visualized skin  Eyes: EOMI, no erythema, sclera icterus or discharge noted  Resp: Appears to be breathing comfortably without accessory muscle usage, speaking in full sentences, no cough  MSK: Appears to have normal range of motion based on visualized movements  Neurologic: No apparent tremors, facial movements symmetric  Psych: affect bright, alert and oriented    The rest of a comprehensive physical examination is deferred due to PHE (public health emergency) video restrictions        Labs:  I personally reviewed the following labs:    Most Recent 3 CBC's:Recent Labs   Lab Test 04/22/22  0643 03/24/22  1503 03/07/22  0834   WBC 5.7 7.3 7.6  7.7   HGB 12.4* 12.1* 12.2*  12.4*   MCV 91 95 93  92    411 350  349     Most Recent 3 BMP's:  Recent Labs   Lab Test 04/22/22  0643 03/24/22  1503 03/07/22  0834    139 141   POTASSIUM 4.3 3.8 3.6   CHLORIDE 109 109 109   CO2 25 23 22   BUN 16 12 17   CR 1.02 1.06 0.95   ANIONGAP 6 7 10   HARDEEP 8.8 8.6 8.8   * 99 106*     Most Recent 2 LFT's:  Recent Labs   Lab Test 04/22/22  0643 03/24/22  1503   AST 29 18   ALT 29 19   ALKPHOS 54 55   BILITOTAL 0.3 0.2      Latest Reference Range & Units 04/22/22 06:43   Albumin Fraction 3.7 - 5.1 g/dL 4.4   Alpha 1 Fraction 0.2 - 0.4 g/dL 0.3   Alpha 2 Fraction 0.5 - 0.9 g/dL 0.6   Beta Fraction 0.6 - 1.0 g/dL 0.7   ELP Interpretation:  Small monoclonal protein (0.1 g/dL) seen in the gamma fraction. See immunofixation report on same specimen. Marked hypogammaglobulinemia. Pathologic significance requires clinical correlation. Rizwan Ace M.D., Ph.D., Pathologist.   Gamma Fraction 0.7 - 1.6 g/dL 0.3 (L)   IGA 84 - 499 mg/dL 35 (L)    - 1,616 mg/dL 344 (L)   IGM 35 - 242 mg/dL 20 (L)   Immunofixation ELP  Monoclonal IgG immunoglobulin of kappa light chain type. Monoclonal antibody therapeutics (e.g. Daratumumab) may appear as monoclonal proteins on  serum electrophoresis and immunofixation. Results should be interpreted with caution if the patient is receiving monoclonal antibody therapy. Pathologic significance requires clinical correlation. Rizwan Ace M.D., Ph.D., Pathologist   Kappa Free Lt Chain 0.33 - 1.94 mg/dL 0.59   Kappa Lambda Ratio 0.26 - 1.65  1.31   Lambda Free Lt Chain 0.57 - 2.63 mg/dL 0.45 (L)   Monoclonal Peak <=0.0 g/dL 0.1 (H)   Total Protein Serum for ELP 6.8 - 8.8 g/dL 6.3 (L)   (L): Data is abnormally low  (H): Data is abnormally high    Assessment and Plan  Logan has multiple myeloma with high risk cytogenetics. He got Miracle-RVD with VGPR, then underwent autologous transplant 11/11/21. Continue acyclovir daily for viral ppx and bactrim M/T for PJP ppx. Given his high risk cytogenetics he started miracle + revlimid maintenance therapy 2/21/22. Follow IgG and replete if it remains <300. He had severe flu-like effects from his first dose of Zometa but he has been tolerating monthly Zometa dosed at 3 mg. Continue Vit D. Continue B vitamin supplement to help his neuropathy     Clarified with Dr. Vazquez that he only needs one dose of dexamethasone on the days he is receiving daratumumab.  He does not need daily dosing on the subsequent days.    40 minutes spent on the date of the encounter doing chart review, review of test results, patient visit and documentation     PATTY Solis [facility] [address] [address] [phone]

## 2022-04-26 NOTE — LETTER
4/26/2022         RE: June Ronquillo  [address]  [facility] [address]        Dear Colleague,    Thank you for referring your patient, June Ronquillo, to the [facility]. Please see a copy of my visit note below.    June is a 51 year old who is being evaluated via a billable video visit.      If the video visit is dropped, the invitation should be resent by: Send to e-mail at: [email or web address]  Will anyone else be joining your video visit? No      Video Start Time: 3:14 PM  Video-Visit Details    Type of service:  Video Visit    Video End Time:3:31 PM    Originating Location (pt. Location): Home    Distant Location (provider location):  [facility]     Platform used for Video Visit: [employer]    Oncologic Hx:   - 4/30/2021 M spike of 34.8 in urine.M spike in blood 0.2; IgG 702 with depressed IgA and IgM. Beta 2 microglobulin 2.7. Lambda  with K/L ratio of 0.01. WBC 11.1 with absolute eos of 1.0. hgb, plt, Cr (1.1), and albumin WNL.    -4/30/2021 CT abd/pelvis showed sclerotic marrow changes with numerous lytic appearing lesions throughout the imaged portions of the spine, pelvis and ribs.      -PET scan 5/11/2021 Numerous osteolytic lesions which predominantly demonstrate uptake below liver background levels. There is one intraosseous lesion in the left lateral 9th rib that demonstrates uptake above background, possibly due to nondisplaced fracture. Adjacent to this lesion is mild  hypermetabolism to the left pleura, with new small left pleural effusion, suspicious for malignant effusion versus posttraumatic effusion. Pleural uptake may represent extramedullary myeloma extending along the intercostal space versus inflammation.    - BM bx 5/17/2021 with flow showing 4.0% plasma cells which express CD19 (dim), CD38 (bright), CD45 (dim), , and monotypic cytoplasmic lambda immunoglobulin light chains but lack CD20 and CD56.   "Hypercellular bone marrow for age (approximately 75% cellularity) with adequate trilineage hematopoiesis. Plasma cell infiltrate: Interstitial, lambda monotypic and representing approximately 60% the bone marrow cellularity, by  immunohistochemical stain. Cytpgenetics with 2 related hypodiploid clones. One with loss of Y, monosomy 13 and 14 (5%) and one with translocation of 8p and 14, derivative 16 with long arm replaced by extra copy 1q,monosomy 21. FISH showed gain of 1q, loss of 13 and 14, IGH-MYC fusion t(8:14)    - Started Miracle-RVd 21 day with velcade on days 1,8,15.     -Cycle 2 Miracle-RVd. Dose reduce dex to 80 mg d/t fatigue and stomach upset on dex days. Also having cough with basilar streaking on CXR    - 8/31/2021 BM bx -rare suspicious plasma cells in touch imprint. No increase in plasma cells by morph.    -8/31/2021 PET/CT Diffuse osteolytic lesions throughout the axial and appendicular skeleton. Increased sclerosis since prior exam 5/11/2021 without increased metabolism or size.  Hypermetabolic pretracheal lymph node as well as hypermetabolic level 2 lymph nodes within the right neck. These lymph nodes are likely reactive from autoimmune activation via medical therapy. Hypermetabolic subcentimeter nodules within the thyroid gland    - 11/11/2021 Autologous BMT    - 2/21/22 started maintenance with Revlimid 10 mg daily and daratumumab monthly; did Revlimid alone for C1 due to low IgG levels, added daratumumab starting C2    Interval Hx:   He feels well overall.  Feeling nervous about his thyroid surgery. He is tolerating therapy well other than the days he takes steroids (he has been taking dexamethasone daily for 3 days following each daratumumab infusion). Neuropathy is still present but is \"very mild.\"     Patient denies fevers, chills, night sweats, unexplained weight changes, headaches, dizziness, vision or hearing changes, new lumps or bumps, chest pain, shortness of breath, cough, abdominal " pain, nausea, vomiting, changes to bowel or bladder, swelling of extremities, bleeding issues, or rash.      A comprehensive review of systems was completed and negative except as described above.       Video physical exam  General: Patient appears well in no acute distress.   Skin: No visualized rash or lesions on visualized skin  Eyes: EOMI, no erythema, sclera icterus or discharge noted  Resp: Appears to be breathing comfortably without accessory muscle usage, speaking in full sentences, no cough  MSK: Appears to have normal range of motion based on visualized movements  Neurologic: No apparent tremors, facial movements symmetric  Psych: affect bright, alert and oriented    The rest of a comprehensive physical examination is deferred due to PHE (public health emergency) video restrictions        Labs:  I personally reviewed the following labs:    Most Recent 3 CBC's:Recent Labs   Lab Test 04/22/22  0643 03/24/22  1503 03/07/22  0834   WBC 5.7 7.3 7.6  7.7   HGB 12.4* 12.1* 12.2*  12.4*   MCV 91 95 93  92    411 350  349     Most Recent 3 BMP's:  Recent Labs   Lab Test 04/22/22  0643 03/24/22  1503 03/07/22  0834    139 141   POTASSIUM 4.3 3.8 3.6   CHLORIDE 109 109 109   CO2 25 23 22   BUN 16 12 17   CR 1.02 1.06 0.95   ANIONGAP 6 7 10   HARDEEP 8.8 8.6 8.8   * 99 106*     Most Recent 2 LFT's:  Recent Labs   Lab Test 04/22/22  0643 03/24/22  1503   AST 29 18   ALT 29 19   ALKPHOS 54 55   BILITOTAL 0.3 0.2      Latest Reference Range & Units 04/22/22 06:43   Albumin Fraction 3.7 - 5.1 g/dL 4.4   Alpha 1 Fraction 0.2 - 0.4 g/dL 0.3   Alpha 2 Fraction 0.5 - 0.9 g/dL 0.6   Beta Fraction 0.6 - 1.0 g/dL 0.7   ELP Interpretation:  Small monoclonal protein (0.1 g/dL) seen in the gamma fraction. See immunofixation report on same specimen. Marked hypogammaglobulinemia. Pathologic significance requires clinical correlation. Rizwan Ace M.D., Ph.D., Pathologist.   Gamma Fraction 0.7 - 1.6 g/dL 0.3  (L)   IGA 84 - 499 mg/dL 35 (L)    - 1,616 mg/dL 344 (L)   IGM 35 - 242 mg/dL 20 (L)   Immunofixation ELP  Monoclonal IgG immunoglobulin of kappa light chain type. Monoclonal antibody therapeutics (e.g. Daratumumab) may appear as monoclonal proteins on serum electrophoresis and immunofixation. Results should be interpreted with caution if the patient is receiving monoclonal antibody therapy. Pathologic significance requires clinical correlation. Rizwan Ace M.D., Ph.D., Pathologist   Kappa Free Lt Chain 0.33 - 1.94 mg/dL 0.59   Kappa Lambda Ratio 0.26 - 1.65  1.31   Lambda Free Lt Chain 0.57 - 2.63 mg/dL 0.45 (L)   Monoclonal Peak <=0.0 g/dL 0.1 (H)   Total Protein Serum for ELP 6.8 - 8.8 g/dL 6.3 (L)   (L): Data is abnormally low  (H): Data is abnormally high    Assessment and Plan  Logan has multiple myeloma with high risk cytogenetics. He got Miracle-RVD with VGPR, then underwent autologous transplant 11/11/21. Continue acyclovir daily for viral ppx and bactrim M/T for PJP ppx. Given his high risk cytogenetics he started miracle + revlimid maintenance therapy 2/21/22. Follow IgG and replete if it remains <300. He had severe flu-like effects from his first dose of Zometa but he has been tolerating monthly Zometa dosed at 3 mg. Continue Vit D. Continue B vitamin supplement to help his neuropathy     Clarified with Dr. Vazquez that he only needs one dose of dexamethasone on the days he is receiving daratumumab.  He does not need daily dosing on the subsequent days.    40 minutes spent on the date of the encounter doing chart review, review of test results, patient visit and documentation         Again, thank you for allowing me to participate in the care of your patient.      Sincerely,    PATTY Solis CNP

## 2022-04-27 DIAGNOSIS — C90.00 MULTIPLE MYELOMA NOT HAVING ACHIEVED REMISSION (H): Primary | ICD-10-CM

## 2022-04-27 RX ORDER — NALOXONE HYDROCHLORIDE 0.4 MG/ML
0.2 INJECTION, SOLUTION INTRAMUSCULAR; INTRAVENOUS; SUBCUTANEOUS
Status: CANCELLED | OUTPATIENT
Start: 2022-06-13

## 2022-04-27 RX ORDER — ALBUTEROL SULFATE 0.83 MG/ML
2.5 SOLUTION RESPIRATORY (INHALATION)
Status: CANCELLED | OUTPATIENT
Start: 2022-06-13

## 2022-04-27 RX ORDER — DIPHENHYDRAMINE HCL 25 MG
50 CAPSULE ORAL ONCE
Status: CANCELLED
Start: 2022-06-13 | End: 2022-06-13

## 2022-04-27 RX ORDER — ACETAMINOPHEN 325 MG/1
650 TABLET ORAL ONCE
Status: CANCELLED
Start: 2022-07-11 | End: 2022-07-11

## 2022-04-27 RX ORDER — DIPHENHYDRAMINE HCL 25 MG
50 CAPSULE ORAL ONCE
Status: CANCELLED
Start: 2022-07-11 | End: 2022-07-11

## 2022-04-27 RX ORDER — ALBUTEROL SULFATE 90 UG/1
1-2 AEROSOL, METERED RESPIRATORY (INHALATION)
Status: CANCELLED
Start: 2022-06-13

## 2022-04-27 RX ORDER — EPINEPHRINE 1 MG/ML
0.3 INJECTION, SOLUTION INTRAMUSCULAR; SUBCUTANEOUS EVERY 5 MIN PRN
Status: CANCELLED | OUTPATIENT
Start: 2022-07-11

## 2022-04-27 RX ORDER — DIPHENHYDRAMINE HYDROCHLORIDE 50 MG/ML
50 INJECTION INTRAMUSCULAR; INTRAVENOUS
Status: CANCELLED
Start: 2022-07-11

## 2022-04-27 RX ORDER — ALBUTEROL SULFATE 0.83 MG/ML
2.5 SOLUTION RESPIRATORY (INHALATION)
Status: CANCELLED | OUTPATIENT
Start: 2022-07-11

## 2022-04-27 RX ORDER — MEPERIDINE HYDROCHLORIDE 25 MG/ML
25 INJECTION INTRAMUSCULAR; INTRAVENOUS; SUBCUTANEOUS EVERY 30 MIN PRN
Status: CANCELLED | OUTPATIENT
Start: 2022-07-11

## 2022-04-27 RX ORDER — MEPERIDINE HYDROCHLORIDE 25 MG/ML
25 INJECTION INTRAMUSCULAR; INTRAVENOUS; SUBCUTANEOUS EVERY 30 MIN PRN
Status: CANCELLED | OUTPATIENT
Start: 2022-06-13

## 2022-04-27 RX ORDER — ALBUTEROL SULFATE 90 UG/1
1-2 AEROSOL, METERED RESPIRATORY (INHALATION)
Status: CANCELLED
Start: 2022-07-11

## 2022-04-27 RX ORDER — DEXAMETHASONE 4 MG/1
12 TABLET ORAL ONCE
Status: CANCELLED
Start: 2022-06-13 | End: 2022-06-13

## 2022-04-27 RX ORDER — ACETAMINOPHEN 325 MG/1
650 TABLET ORAL ONCE
Status: CANCELLED
Start: 2022-06-13 | End: 2022-06-13

## 2022-04-27 RX ORDER — METHYLPREDNISOLONE SODIUM SUCCINATE 125 MG/2ML
125 INJECTION, POWDER, LYOPHILIZED, FOR SOLUTION INTRAMUSCULAR; INTRAVENOUS
Status: CANCELLED
Start: 2022-06-13

## 2022-04-27 RX ORDER — DEXAMETHASONE 4 MG/1
12 TABLET ORAL ONCE
Status: CANCELLED
Start: 2022-07-11 | End: 2022-07-11

## 2022-04-27 RX ORDER — METHYLPREDNISOLONE SODIUM SUCCINATE 125 MG/2ML
125 INJECTION, POWDER, LYOPHILIZED, FOR SOLUTION INTRAMUSCULAR; INTRAVENOUS
Status: CANCELLED
Start: 2022-07-11

## 2022-04-27 RX ORDER — DIPHENHYDRAMINE HYDROCHLORIDE 50 MG/ML
50 INJECTION INTRAMUSCULAR; INTRAVENOUS
Status: CANCELLED
Start: 2022-06-13

## 2022-04-27 RX ORDER — EPINEPHRINE 1 MG/ML
0.3 INJECTION, SOLUTION INTRAMUSCULAR; SUBCUTANEOUS EVERY 5 MIN PRN
Status: CANCELLED | OUTPATIENT
Start: 2022-06-13

## 2022-04-27 RX ORDER — NALOXONE HYDROCHLORIDE 0.4 MG/ML
0.2 INJECTION, SOLUTION INTRAMUSCULAR; INTRAVENOUS; SUBCUTANEOUS
Status: CANCELLED | OUTPATIENT
Start: 2022-07-11

## 2022-04-28 DIAGNOSIS — C90.00 MULTIPLE MYELOMA NOT HAVING ACHIEVED REMISSION (H): ICD-10-CM

## 2022-04-29 ENCOUNTER — LAB (OUTPATIENT)
Dept: LAB | Facility: CLINIC | Age: 52
End: 2022-04-29
Payer: COMMERCIAL

## 2022-04-29 DIAGNOSIS — Z11.59 ENCOUNTER FOR SCREENING FOR OTHER VIRAL DISEASES: ICD-10-CM

## 2022-04-29 PROCEDURE — U0005 INFEC AGEN DETEC AMPLI PROBE: HCPCS | Mod: 90 | Performed by: PATHOLOGY

## 2022-04-29 PROCEDURE — 99000 SPECIMEN HANDLING OFFICE-LAB: CPT | Performed by: PATHOLOGY

## 2022-04-29 PROCEDURE — U0003 INFECTIOUS AGENT DETECTION BY NUCLEIC ACID (DNA OR RNA); SEVERE ACUTE RESPIRATORY SYNDROME CORONAVIRUS 2 (SARS-COV-2) (CORONAVIRUS DISEASE [COVID-19]), AMPLIFIED PROBE TECHNIQUE, MAKING USE OF HIGH THROUGHPUT TECHNOLOGIES AS DESCRIBED BY CMS-2020-01-R: HCPCS | Mod: 90 | Performed by: PATHOLOGY

## 2022-04-29 RX ORDER — LENALIDOMIDE 10 MG/1
10 CAPSULE ORAL DAILY
Qty: 28 CAPSULE | Refills: 0 | Status: SHIPPED | OUTPATIENT
Start: 2022-04-29 | End: 2022-08-16

## 2022-04-30 LAB — SARS-COV-2 RNA RESP QL NAA+PROBE: NEGATIVE

## 2022-05-02 ENCOUNTER — ANESTHESIA EVENT (OUTPATIENT)
Dept: SURGERY | Facility: AMBULATORY SURGERY CENTER | Age: 52
End: 2022-05-02
Payer: COMMERCIAL

## 2022-05-03 ENCOUNTER — HOSPITAL ENCOUNTER (OUTPATIENT)
Facility: AMBULATORY SURGERY CENTER | Age: 52
Discharge: HOME | End: 2022-05-03
Attending: SURGERY
Payer: COMMERCIAL

## 2022-05-03 ENCOUNTER — ANESTHESIA (OUTPATIENT)
Dept: SURGERY | Facility: AMBULATORY SURGERY CENTER | Age: 52
End: 2022-05-03
Payer: COMMERCIAL

## 2022-05-03 VITALS
HEIGHT: 66 IN | OXYGEN SATURATION: 99 % | TEMPERATURE: 97.8 F | RESPIRATION RATE: 18 BRPM | BODY MASS INDEX: 26.52 KG/M2 | DIASTOLIC BLOOD PRESSURE: 75 MMHG | WEIGHT: 165 LBS | SYSTOLIC BLOOD PRESSURE: 132 MMHG | HEART RATE: 80 BPM

## 2022-05-03 DIAGNOSIS — C73 MALIGNANT NEOPLASM OF THYROID GLAND (H): ICD-10-CM

## 2022-05-03 LAB — PTH-INTACT SERPL-MCNC: 26 PG/ML (ref 15–65)

## 2022-05-03 PROCEDURE — 88307 TISSUE EXAM BY PATHOLOGIST: CPT | Mod: TC | Performed by: SURGERY

## 2022-05-03 PROCEDURE — 60252 REMOVAL OF THYROID: CPT | Mod: GC | Performed by: SURGERY

## 2022-05-03 PROCEDURE — 88307 TISSUE EXAM BY PATHOLOGIST: CPT | Mod: 26 | Performed by: PATHOLOGY

## 2022-05-03 PROCEDURE — 60252 REMOVAL OF THYROID: CPT

## 2022-05-03 PROCEDURE — 83970 ASSAY OF PARATHORMONE: CPT | Performed by: PATHOLOGY

## 2022-05-03 RX ORDER — LIDOCAINE HYDROCHLORIDE 20 MG/ML
INJECTION, SOLUTION INFILTRATION; PERINEURAL PRN
Status: DISCONTINUED | OUTPATIENT
Start: 2022-05-03 | End: 2022-05-03

## 2022-05-03 RX ORDER — OXYCODONE HYDROCHLORIDE 5 MG/1
5 TABLET ORAL EVERY 4 HOURS PRN
Qty: 10 TABLET | Refills: 0 | Status: SHIPPED | OUTPATIENT
Start: 2022-05-03 | End: 2022-06-24

## 2022-05-03 RX ORDER — PROPOFOL 10 MG/ML
INJECTION, EMULSION INTRAVENOUS PRN
Status: DISCONTINUED | OUTPATIENT
Start: 2022-05-03 | End: 2022-05-03

## 2022-05-03 RX ORDER — DEXAMETHASONE SODIUM PHOSPHATE 10 MG/ML
10 INJECTION, SOLUTION INTRAMUSCULAR; INTRAVENOUS ONCE
Status: DISCONTINUED | OUTPATIENT
Start: 2022-05-03 | End: 2022-05-03 | Stop reason: HOSPADM

## 2022-05-03 RX ORDER — ACETAMINOPHEN 325 MG/1
975 TABLET ORAL ONCE
Status: COMPLETED | OUTPATIENT
Start: 2022-05-03 | End: 2022-05-03

## 2022-05-03 RX ORDER — CALCIUM CARBONATE 500 MG/1
2 TABLET, CHEWABLE ORAL 3 TIMES DAILY
Qty: 90 TABLET | Refills: 1 | Status: SHIPPED | OUTPATIENT
Start: 2022-05-03 | End: 2023-06-05

## 2022-05-03 RX ORDER — SODIUM CHLORIDE, SODIUM LACTATE, POTASSIUM CHLORIDE, CALCIUM CHLORIDE 600; 310; 30; 20 MG/100ML; MG/100ML; MG/100ML; MG/100ML
INJECTION, SOLUTION INTRAVENOUS CONTINUOUS
Status: DISCONTINUED | OUTPATIENT
Start: 2022-05-03 | End: 2022-05-03 | Stop reason: HOSPADM

## 2022-05-03 RX ORDER — CALCITRIOL 0.25 UG/1
0.25 CAPSULE, LIQUID FILLED ORAL 3 TIMES DAILY
Qty: 90 CAPSULE | Refills: 0 | Status: SHIPPED | OUTPATIENT
Start: 2022-05-03 | End: 2022-06-24

## 2022-05-03 RX ORDER — ONDANSETRON 2 MG/ML
INJECTION INTRAMUSCULAR; INTRAVENOUS PRN
Status: DISCONTINUED | OUTPATIENT
Start: 2022-05-03 | End: 2022-05-03

## 2022-05-03 RX ORDER — FENTANYL CITRATE 50 UG/ML
INJECTION, SOLUTION INTRAMUSCULAR; INTRAVENOUS PRN
Status: DISCONTINUED | OUTPATIENT
Start: 2022-05-03 | End: 2022-05-03

## 2022-05-03 RX ORDER — LEVOTHYROXINE SODIUM 137 UG/1
137 TABLET ORAL DAILY
Qty: 21 TABLET | Refills: 0 | Status: SHIPPED | OUTPATIENT
Start: 2022-05-03 | End: 2022-05-26

## 2022-05-03 RX ORDER — OXYCODONE HYDROCHLORIDE 5 MG/1
5 TABLET ORAL ONCE
Status: COMPLETED | OUTPATIENT
Start: 2022-05-03 | End: 2022-05-03

## 2022-05-03 RX ORDER — DEXAMETHASONE SODIUM PHOSPHATE 4 MG/ML
INJECTION, SOLUTION INTRA-ARTICULAR; INTRALESIONAL; INTRAMUSCULAR; INTRAVENOUS; SOFT TISSUE PRN
Status: DISCONTINUED | OUTPATIENT
Start: 2022-05-03 | End: 2022-05-03

## 2022-05-03 RX ORDER — LIDOCAINE 40 MG/G
CREAM TOPICAL
Status: DISCONTINUED | OUTPATIENT
Start: 2022-05-03 | End: 2022-05-03 | Stop reason: HOSPADM

## 2022-05-03 RX ORDER — PROPOFOL 10 MG/ML
INJECTION, EMULSION INTRAVENOUS CONTINUOUS PRN
Status: DISCONTINUED | OUTPATIENT
Start: 2022-05-03 | End: 2022-05-03

## 2022-05-03 RX ADMIN — PROPOFOL 200 MCG/KG/MIN: 10 INJECTION, EMULSION INTRAVENOUS at 11:05

## 2022-05-03 RX ADMIN — ONDANSETRON 4 MG: 2 INJECTION INTRAMUSCULAR; INTRAVENOUS at 11:05

## 2022-05-03 RX ADMIN — Medication 100 MCG: at 11:36

## 2022-05-03 RX ADMIN — OXYCODONE HYDROCHLORIDE 5 MG: 5 TABLET ORAL at 13:45

## 2022-05-03 RX ADMIN — PROPOFOL 150 MG: 10 INJECTION, EMULSION INTRAVENOUS at 11:05

## 2022-05-03 RX ADMIN — DEXAMETHASONE SODIUM PHOSPHATE 10 MG: 4 INJECTION, SOLUTION INTRA-ARTICULAR; INTRALESIONAL; INTRAMUSCULAR; INTRAVENOUS; SOFT TISSUE at 11:05

## 2022-05-03 RX ADMIN — LIDOCAINE HYDROCHLORIDE 80 MG: 20 INJECTION, SOLUTION INFILTRATION; PERINEURAL at 11:05

## 2022-05-03 RX ADMIN — PROPOFOL 50 MG: 10 INJECTION, EMULSION INTRAVENOUS at 11:12

## 2022-05-03 RX ADMIN — ACETAMINOPHEN 975 MG: 325 TABLET ORAL at 09:54

## 2022-05-03 RX ADMIN — FENTANYL CITRATE 50 MCG: 50 INJECTION, SOLUTION INTRAMUSCULAR; INTRAVENOUS at 11:17

## 2022-05-03 RX ADMIN — SODIUM CHLORIDE, SODIUM LACTATE, POTASSIUM CHLORIDE, CALCIUM CHLORIDE: 600; 310; 30; 20 INJECTION, SOLUTION INTRAVENOUS at 10:59

## 2022-05-03 RX ADMIN — FENTANYL CITRATE 50 MCG: 50 INJECTION, SOLUTION INTRAMUSCULAR; INTRAVENOUS at 11:12

## 2022-05-03 NOTE — ANESTHESIA POSTPROCEDURE EVALUATION
Patient: June Ronquillo    Procedure: Procedure(s):  total thyroidectomy, Left selective neck dissection level 6 and level 7        Anesthesia Type:  General    Note:  Disposition: Outpatient   Postop Pain Control: Uneventful            Sign Out: Well controlled pain   PONV: No   Neuro/Psych: Uneventful            Sign Out: Acceptable/Baseline neuro status   Airway/Respiratory: Uneventful            Sign Out: Acceptable/Baseline resp. status   CV/Hemodynamics: Uneventful            Sign Out: Acceptable CV status; No obvious hypovolemia; No obvious fluid overload   Other NRE: NONE   DID A NON-ROUTINE EVENT OCCUR? No           Last vitals:  Vitals Value Taken Time   /75 05/03/22 1330   Temp 36.6  C (97.9  F) 05/03/22 1330   Pulse 79 05/03/22 1330   Resp 18 05/03/22 1246   SpO2 98 % 05/03/22 1330       Electronically Signed By: Kenny Vela MD, MD  May 3, 2022  2:21 PM

## 2022-05-03 NOTE — BRIEF OP NOTE
[facility]    Brief Operative Note    Pre-operative diagnosis: Malignant neoplasm of thyroid gland (H) [C73]  Post-operative diagnosis Same as pre-operative diagnosis    Procedure: Procedure(s):  total thyroidectomy, Left selective neck dissection level 6 and level 7   Surgeon: Surgeon(s) and Role:     * Clarita Sepulveda MD - Primary     * Tato Gr MD - Resident - Assisting  Anesthesia: General   Estimated Blood Loss: 5 mL from 5/3/2022 10:59 AM to 5/3/2022 12:44 PM      Drains: None  Specimens:   ID Type Source Tests Collected by Time Destination   1 : total thyroid  Tissue Thyroid SURGICAL PATHOLOGY EXAM Clarita Sepulveda MD 5/3/2022 12:15 PM    2 : Left level 6 and 7 Tissue Thyroid SURGICAL PATHOLOGY EXAM Clarita Sepulveda MD 5/3/2022 12:20 PM      Findings:   see operative note.  Complications: None.  Implants: * No implants in log *

## 2022-05-03 NOTE — ANESTHESIA PREPROCEDURE EVALUATION
Anesthesia Pre-Procedure Evaluation    Patient: June Ronquillo   MRN: [id number] : 1970        Procedure : Procedure(s):  total thyroidectomy central neck dissection          Past Medical History:   Diagnosis Date     CAD (coronary artery disease)     s/p stent to CFX     Heart attack (H)      Hyperlipidemia LDL goal <100      Hypertension      Stented coronary artery      Thyroid disease       Past Surgical History:   Procedure Laterality Date     BONE MARROW BIOPSY, BONE SPECIMEN, NEEDLE/TROCAR Left 2021    Procedure: BIOPSY, BONE MARROW with aspiration and ancillary studies;  Surgeon: Niharika Regalado MD;  Location: [facility] OR     BONE MARROW BIOPSY, BONE SPECIMEN, NEEDLE/TROCAR Left 10/14/2021    Procedure: BIOPSY, BONE MARROW;  Surgeon: Alexa Albert APRN [facility];  Location: [facility] OR     BONE MARROW BIOPSY, BONE SPECIMEN, NEEDLE/TROCAR Right 3/7/2022    Procedure: BIOPSY, BONE MARROW;  Surgeon: Deborah Carmona PA-C;  Location: [facility] OR     HEART CATH PTCA SINGLE VESSEL  10/10/2004    Stent to CFX - [facility]      INSERT CATHETER VASCULAR ACCESS Right 11/3/2021    Procedure: NON VALVED TUNNELED DUAL LUMEN APHARESIS CAPABLE LINE INSERTION, VASCULAR ACCESS CATHETER;  Surgeon: Werner Mcnamara MD;  Location: [facility] OR     IR CVC TUNNEL PLACEMENT > 5 YRS OF AGE  11/3/2021     IR CVC TUNNEL REMOVAL RIGHT  2021      Allergies   Allergen Reactions     Blood Transfusion Related (Informational Only) Other (See Comments)     Patient has a history of a clinically significant antibody against RBC antigens.  A delay in compatible RBCs may occur.       Social History     Tobacco Use     Smoking status: Never Smoker     Smokeless tobacco: Never Used   Substance Use Topics     Alcohol use: Not Currently      Wt Readings from Last 1 Encounters:   22 74.5 kg (164 lb 4.8 oz)        Anesthesia Evaluation   Pt has had prior anesthetic. Type: MAC.        ROS/MED HX  ENT/Pulmonary:  - neg  pulmonary ROS   (+) DEVIKA risk factors, snores loudly, hypertension,     Neurologic:  - neg neurologic ROS     Cardiovascular:     (+) hypertension--CAD ---    METS/Exercise Tolerance:     Hematologic:  - neg hematologic  ROS     Musculoskeletal:  - neg musculoskeletal ROS     GI/Hepatic:  - neg GI/hepatic ROS     Renal/Genitourinary:  - neg Renal ROS     Endo:     (+) thyroid problem,     Psychiatric/Substance Use:  - neg psychiatric ROS     Infectious Disease:       Malignancy:       Other:            Physical Exam    Airway  airway exam normal           Respiratory Devices and Support         Dental  no notable dental history         Cardiovascular   cardiovascular exam normal          Pulmonary   pulmonary exam normal                OUTSIDE LABS:  CBC:   Lab Results   Component Value Date    WBC 5.7 04/22/2022    WBC 7.3 03/24/2022    HGB 12.4 (L) 04/22/2022    HGB 12.1 (L) 03/24/2022    HCT 37.9 (L) 04/22/2022    HCT 37.4 (L) 03/24/2022     04/22/2022     03/24/2022     BMP:   Lab Results   Component Value Date     04/22/2022     03/24/2022    POTASSIUM 4.3 04/22/2022    POTASSIUM 3.8 03/24/2022    CHLORIDE 109 04/22/2022    CHLORIDE 109 03/24/2022    CO2 25 04/22/2022    CO2 23 03/24/2022    BUN 16 04/22/2022    BUN 12 03/24/2022    CR 1.02 04/22/2022    CR 1.06 03/24/2022     (H) 04/22/2022    GLC 99 03/24/2022     COAGS:   Lab Results   Component Value Date    PTT 41 (H) 11/22/2021    INR 0.96 03/07/2022    FIBR 485 11/21/2021     POC: No results found for: BGM, HCG, HCGS  HEPATIC:   Lab Results   Component Value Date    ALBUMIN 3.9 04/22/2022    PROTTOTAL 6.5 (L) 04/22/2022    ALT 29 04/22/2022    AST 29 04/22/2022    ALKPHOS 54 04/22/2022    BILITOTAL 0.3 04/22/2022     OTHER:   Lab Results   Component Value Date    LACT 1.9 11/22/2021    A1C 6.0 (H) 01/25/2021    HARDEEP 8.8 04/22/2022    PHOS 3.2 03/07/2022    MAG 1.9 03/07/2022    TSH 1.55 11/14/2021    T4 0.97 10/20/2021     CRP <2.9 04/30/2021       Anesthesia Plan    ASA Status:  3   NPO Status:  NPO Appropriate    Anesthesia Type: General.     - Airway: ETT   Induction: Intravenous.   Maintenance: TIVA.        Consents    Anesthesia Plan(s) and associated risks, benefits, and realistic alternatives discussed. Questions answered and patient/representative(s) expressed understanding.     - Discussed: Risks, Benefits and Alternatives for BOTH SEDATION and the PROCEDURE were discussed     - Discussed with:  Patient         Postoperative Care    Pain management: Oral pain medications.   PONV prophylaxis: Ondansetron (or other 5HT-3), Dexamethasone or Solumedrol     Comments:                Kenny Vela MD, MD

## 2022-05-03 NOTE — ANESTHESIA CARE TRANSFER NOTE
Patient: June Ronquillo    Procedure: Procedure(s):  total thyroidectomy, Left selective neck dissection level 6 and level 7        Diagnosis: Malignant neoplasm of thyroid gland (H) [C73]  Diagnosis Additional Information: No value filed.    Anesthesia Type:   General     Note:    Oropharynx: oral airway in place and spontaneously breathing  Level of Consciousness: unresponsive  Oxygen Supplementation: face mask  Level of Supplemental Oxygen (L/min / FiO2): 4  Independent Airway: airway patency not satisfactory and stable  Dentition: dentition unchanged  Vital Signs Stable: post-procedure vital signs reviewed and stable  Report to RN Given: handoff report given  Patient transferred to: PACU    Handoff Report: Identifed the Patient, Identified the Reponsible Provider, Reviewed the pertinent medical history, Discussed the surgical course, Reviewed Intra-OP anesthesia mangement and issues during anesthesia, Set expectations for post-procedure period and Allowed opportunity for questions and acknowledgement of understanding      Vitals:  Vitals Value Taken Time   /57    Temp 97.8    Pulse 83    Resp 16    SpO2 99%        Electronically Signed By: PATTY BEAVER CRNA  May 3, 2022  12:49 PM

## 2022-05-03 NOTE — DISCHARGE INSTRUCTIONS
[facility]  Home Care Following Anesthesia  For 24 hours after surgery:  Get plenty of rest.  A responsible adult must stay with you for at least 24 hours after you leave the surgery center.  Do not drive or use heavy equipment.  If you have weakness or tingling, don't drive or use heavy equipment until this feeling goes away.   Do not drink alcohol.   Avoid strenuous or risky activities.  Ask for help when climbing stairs.  You may feel lightheaded.  IF so, sit for a few minutes before standing.  Have someone help you get up.   If you have nausea (feel sick to your stomach): Drink only clear liquids such as apple juice, ginger ale, broth or 7-Up.  Rest may also help.  Be sure to drink enough fluids.  Move to a regular diet as you feel able.   You may have a slight fever.  Call the doctor if your fever is over 100 F (37.7 C) (taken under the tongue) or lasts longer than 24 hours.  You may have a dry mouth, a sore throat, muscle aches or trouble sleeping. These should go away after 24 hours.  Do not make important or legal decisions.   It is recommended to avoid smoking.               Tips for taking pain medications  To get the best pain relief possible, remember these points:  Take pain medications as directed, before pain becomes severe.  Pain medication can upset your stomach: taking it with food may help.  Constipation is a common side effect of pain medication. Drink plenty of  fluids.  Eat foods high in fiber. Take a stool softener if recommended by your doctor or pharmacist.  Do not drink alcohol, drive or operate machinery while taking pain medications.  Ask about other ways to control pain, such as with heat, ice or relaxation.    Tylenol/Acetaminophen Consumption  To help encourage the safe use of acetaminophen, the makers of TYLENOL  have lowered the maximum daily dose for single-ingredient Extra Strength TYLENOL  (acetaminophen) products sold in the [place] from 8 pills  per day (4,000 mg) to 6 pills per day (3,000 mg). The dosing interval has also changed from 2 pills every 4-6 hours to 2 pills every 6 hours.  If you feel your pain relief is insufficient, you may take Tylenol/Acetaminophen in addition to your narcotic pain medication.   Be careful not to exceed 3,000 mg of Tylenol/Acetaminophen in a 24 hour period from all sources.  If you are taking extra strength Tylenol/acetaminophen (500 mg), the maximum dose is 6 tablets in 24 hours.  If you are taking regular strength acetaminophen (325 mg), the maximum dose is 9 tablets in 24 hours.  You received a dose of  975 mg of Tylenol @ 10:00 AM . You may take another extra strength dose of Tylenol (975-1,000 MG) @  6:00 PM.       Call a doctor for any of the following:  Signs of infection (fever, growing tenderness at the surgery site, a large amount of drainage or bleeding, severe pain, foul-smelling drainage, redness, swelling).  It has been over 8 to 10 hours since surgery and you are still not able to urinate (pass water).  Headache for over 24 hours.  Numbness, tingling or weakness the day after surgery (if you had spinal anesthesia).  Signs of Covid-19 infection (temperature over 100 degrees, shortness of breath, cough, loss of taste/smell, generalized body aches, persistent headache, chills, sore throat, nausea/vomiting/diarrhea)  Your doctor is:  Dr. Clarita Sepulveda, ENT Otolaryngology: [phone]                    Or dial [phone] and ask for the resident on call for:  ENT Otolaryngology  For emergency care, call the:  [facility]:  [phone] (TTY for hearing impaired: [phone])

## 2022-05-03 NOTE — INTERVAL H&P NOTE
"I have reviewed the surgical (or preoperative) H&P that is linked to this encounter, and examined the patient. There are no significant changes    Clinical Conditions Present on Arrival:  Clinically Significant Risk Factors Present on Admission                  # Platelet Defect: home medication list includes an antiplatelet medication  # Overweight: Estimated body mass index is 26.52 kg/m  as calculated from the following:    Height as of 4/19/22: 1.676 m (5' 6\").    Weight as of 4/22/22: 74.5 kg (164 lb 4.8 oz).       "

## 2022-05-05 ENCOUNTER — LAB (OUTPATIENT)
Dept: LAB | Facility: CLINIC | Age: 52
End: 2022-05-05
Attending: STUDENT IN AN ORGANIZED HEALTH CARE EDUCATION/TRAINING PROGRAM
Payer: COMMERCIAL

## 2022-05-05 DIAGNOSIS — Z11.59 ENCOUNTER FOR SCREENING FOR OTHER VIRAL DISEASES: ICD-10-CM

## 2022-05-05 LAB — SARS-COV-2 RNA RESP QL NAA+PROBE: NEGATIVE

## 2022-05-05 PROCEDURE — U0003 INFECTIOUS AGENT DETECTION BY NUCLEIC ACID (DNA OR RNA); SEVERE ACUTE RESPIRATORY SYNDROME CORONAVIRUS 2 (SARS-COV-2) (CORONAVIRUS DISEASE [COVID-19]), AMPLIFIED PROBE TECHNIQUE, MAKING USE OF HIGH THROUGHPUT TECHNOLOGIES AS DESCRIBED BY CMS-2020-01-R: HCPCS | Mod: 90 | Performed by: PATHOLOGY

## 2022-05-05 PROCEDURE — U0005 INFEC AGEN DETEC AMPLI PROBE: HCPCS | Mod: 90 | Performed by: PATHOLOGY

## 2022-05-05 PROCEDURE — 99000 SPECIMEN HANDLING OFFICE-LAB: CPT | Performed by: PATHOLOGY

## 2022-05-06 ENCOUNTER — ANESTHESIA EVENT (OUTPATIENT)
Dept: SURGERY | Facility: AMBULATORY SURGERY CENTER | Age: 52
End: 2022-05-06
Payer: COMMERCIAL

## 2022-05-06 ENCOUNTER — TELEPHONE (OUTPATIENT)
Dept: TRANSPLANT | Facility: CLINIC | Age: 52
End: 2022-05-06
Payer: COMMERCIAL

## 2022-05-06 ENCOUNTER — APPOINTMENT (OUTPATIENT)
Dept: LAB | Facility: CLINIC | Age: 52
End: 2022-05-06
Payer: COMMERCIAL

## 2022-05-06 DIAGNOSIS — I25.10 CVD (CARDIOVASCULAR DISEASE): Primary | ICD-10-CM

## 2022-05-06 LAB
INTERPRETATION: NORMAL
LAB DIRECTOR COMMENTS: NORMAL
LAB DIRECTOR DISCLAIMER: NORMAL
LAB DIRECTOR INTERPRETATION: NORMAL
LAB DIRECTOR METHODOLOGY: NORMAL
LAB DIRECTOR RESULTS: NORMAL
PATH REPORT.COMMENTS IMP SPEC: NORMAL
PATH REPORT.FINAL DX SPEC: NORMAL
PATH REPORT.GROSS SPEC: NORMAL
PATH REPORT.MICROSCOPIC SPEC OTHER STN: NORMAL
PATH REPORT.RELEVANT HX SPEC: NORMAL
PATHOLOGY SYNOPTIC REPORT: NORMAL
PHOTO IMAGE: NORMAL
SIGNIFICANT RESULTS: NORMAL
SPECIMEN DESCRIPTION: NORMAL
SPECIMEN DESCRIPTION: NORMAL
TEST DETAILS, MDL: NORMAL

## 2022-05-06 PROCEDURE — G0452 MOLECULAR PATHOLOGY INTERPR: HCPCS | Mod: 26 | Performed by: STUDENT IN AN ORGANIZED HEALTH CARE EDUCATION/TRAINING PROGRAM

## 2022-05-06 RX ORDER — ROSUVASTATIN CALCIUM 40 MG/1
40 TABLET, COATED ORAL DAILY
Qty: 30 TABLET | Refills: 3 | Status: SHIPPED | OUTPATIENT
Start: 2022-05-06 | End: 2022-09-12

## 2022-05-06 NOTE — TELEPHONE ENCOUNTER
"Requested Prescriptions   Signed Prescriptions Disp Refills    rosuvastatin (CRESTOR) 40 MG tablet 30 tablet 3     Sig: Take 1 tablet (40 mg) by mouth daily       Statins Protocol Passed - 5/6/2022  7:28 AM        Passed - LDL on file in past 12 months     Recent Labs   Lab Test 10/13/21  0842   LDL 93             Passed - No abnormal creatine kinase in past 12 months     No lab results found.             Passed - Recent (12 mo) or future (30 days) visit within the authorizing provider's specialty     Patient has had an office visit with the authorizing provider or a provider within the authorizing providers department within the previous 12 mos or has a future within next 30 days. See \"Patient Info\" tab in inbasket, or \"Choose Columns\" in Meds & Orders section of the refill encounter.              Passed - Medication is active on med list        Passed - Patient is age 18 or older           Evelyn Najera RN  [facility]       "

## 2022-05-09 ENCOUNTER — HOSPITAL ENCOUNTER (OUTPATIENT)
Facility: AMBULATORY SURGERY CENTER | Age: 52
Discharge: HOME | End: 2022-05-09
Attending: STUDENT IN AN ORGANIZED HEALTH CARE EDUCATION/TRAINING PROGRAM
Payer: COMMERCIAL

## 2022-05-09 ENCOUNTER — ANESTHESIA (OUTPATIENT)
Dept: SURGERY | Facility: AMBULATORY SURGERY CENTER | Age: 52
End: 2022-05-09
Payer: COMMERCIAL

## 2022-05-09 ENCOUNTER — LAB (OUTPATIENT)
Dept: LAB | Facility: CLINIC | Age: 52
End: 2022-05-09
Attending: STUDENT IN AN ORGANIZED HEALTH CARE EDUCATION/TRAINING PROGRAM
Payer: COMMERCIAL

## 2022-05-09 ENCOUNTER — ONCOLOGY VISIT (OUTPATIENT)
Dept: TRANSPLANT | Facility: CLINIC | Age: 52
End: 2022-05-09
Attending: STUDENT IN AN ORGANIZED HEALTH CARE EDUCATION/TRAINING PROGRAM
Payer: COMMERCIAL

## 2022-05-09 VITALS
WEIGHT: 162.1 LBS | HEART RATE: 80 BPM | TEMPERATURE: 98.5 F | BODY MASS INDEX: 26.56 KG/M2 | RESPIRATION RATE: 16 BRPM | SYSTOLIC BLOOD PRESSURE: 127 MMHG | OXYGEN SATURATION: 100 % | DIASTOLIC BLOOD PRESSURE: 80 MMHG

## 2022-05-09 VITALS
TEMPERATURE: 98 F | SYSTOLIC BLOOD PRESSURE: 127 MMHG | RESPIRATION RATE: 16 BRPM | DIASTOLIC BLOOD PRESSURE: 83 MMHG | OXYGEN SATURATION: 100 %

## 2022-05-09 VITALS — HEART RATE: 78 BPM

## 2022-05-09 DIAGNOSIS — C90.00 MULTIPLE MYELOMA NOT HAVING ACHIEVED REMISSION (H): Primary | ICD-10-CM

## 2022-05-09 DIAGNOSIS — C90.00 MULTIPLE MYELOMA NOT HAVING ACHIEVED REMISSION (H): ICD-10-CM

## 2022-05-09 DIAGNOSIS — Z94.81 STATUS POST BONE MARROW TRANSPLANT (H): ICD-10-CM

## 2022-05-09 DIAGNOSIS — Z94.81 STATUS POST BONE MARROW TRANSPLANT (H): Primary | ICD-10-CM

## 2022-05-09 LAB
ALBUMIN SERPL-MCNC: 3.8 G/DL (ref 3.4–5)
ALP SERPL-CCNC: 54 U/L (ref 40–150)
ALT SERPL W P-5'-P-CCNC: 27 U/L (ref 0–70)
ANION GAP SERPL CALCULATED.3IONS-SCNC: 8 MMOL/L (ref 3–14)
AST SERPL W P-5'-P-CCNC: 26 U/L (ref 0–45)
BASOPHILS # BLD AUTO: 0 10E3/UL (ref 0–0.2)
BASOPHILS NFR BLD AUTO: 1 %
BILIRUB SERPL-MCNC: 0.4 MG/DL (ref 0.2–1.3)
BUN SERPL-MCNC: 13 MG/DL (ref 7–30)
CALCIUM SERPL-MCNC: 9.3 MG/DL (ref 8.5–10.1)
CD19 CELLS # BLD: 84 CELLS/UL (ref 107–698)
CD19 CELLS NFR BLD: 5 % (ref 6–27)
CD3 CELLS # BLD: 1534 CELLS/UL (ref 603–2990)
CD3 CELLS NFR BLD: 92 % (ref 49–84)
CD3+CD4+ CELLS # BLD: 473 CELLS/UL (ref 441–2156)
CD3+CD4+ CELLS NFR BLD: 28 % (ref 28–63)
CD3+CD4+ CELLS/CD3+CD8+ CLL BLD: 0.45 % (ref 1.4–2.6)
CD3+CD8+ CELLS # BLD: 1042 CELLS/UL (ref 125–1312)
CD3+CD8+ CELLS NFR BLD: 63 % (ref 10–40)
CD3-CD16+CD56+ CELLS # BLD: 42 CELLS/UL (ref 95–640)
CD3-CD16+CD56+ CELLS NFR BLD: 3 % (ref 4–25)
CHLORIDE BLD-SCNC: 106 MMOL/L (ref 94–109)
CO2 SERPL-SCNC: 26 MMOL/L (ref 20–32)
COLLECT DURATION TIME UR: 24 H
CREAT 24H UR-MRATE: 1.44 G/SPEC (ref 1–2)
CREAT SERPL-MCNC: 0.99 MG/DL (ref 0.66–1.25)
CREAT UR-MCNC: 63 MG/DL
EOSINOPHIL # BLD AUTO: 0.6 10E3/UL (ref 0–0.7)
EOSINOPHIL NFR BLD AUTO: 12 %
ERYTHROCYTE [DISTWIDTH] IN BLOOD BY AUTOMATED COUNT: 15.5 % (ref 10–15)
GFR SERPL CREATININE-BSD FRML MDRD: >90 ML/MIN/1.73M2
GLUCOSE BLD-MCNC: 106 MG/DL (ref 70–99)
HCT VFR BLD AUTO: 35.9 % (ref 40–53)
HGB BLD-MCNC: 12 G/DL (ref 13.3–17.7)
IMM GRANULOCYTES # BLD: 0 10E3/UL
IMM GRANULOCYTES NFR BLD: 0 %
INR PPP: 0.89 (ref 0.85–1.15)
LDH SERPL L TO P-CCNC: 80 U/L (ref 85–227)
LYMPHOCYTES # BLD AUTO: 1.5 10E3/UL (ref 0.8–5.3)
LYMPHOCYTES NFR BLD AUTO: 30 %
MAGNESIUM SERPL-MCNC: 1.8 MG/DL (ref 1.6–2.3)
MCH RBC QN AUTO: 30.1 PG (ref 26.5–33)
MCHC RBC AUTO-ENTMCNC: 33.4 G/DL (ref 31.5–36.5)
MCV RBC AUTO: 90 FL (ref 78–100)
MONOCYTES # BLD AUTO: 0.8 10E3/UL (ref 0–1.3)
MONOCYTES NFR BLD AUTO: 16 %
NEUTROPHILS # BLD AUTO: 2.2 10E3/UL (ref 1.6–8.3)
NEUTROPHILS NFR BLD AUTO: 41 %
NRBC # BLD AUTO: 0 10E3/UL
NRBC BLD AUTO-RTO: 0 /100
PHOSPHATE SERPL-MCNC: 3 MG/DL (ref 2.5–4.5)
PLATELET # BLD AUTO: 321 10E3/UL (ref 150–450)
POTASSIUM BLD-SCNC: 4 MMOL/L (ref 3.4–5.3)
PROT 24H UR-MRATE: 0.16 G/SPEC (ref 0.04–0.23)
PROT SERPL-MCNC: 6.5 G/DL (ref 6.8–8.8)
PROT UR-MCNC: 0.07 G/L
PROT/CREAT 24H UR: 0.11 G/G CR (ref 0–0.2)
RBC # BLD AUTO: 3.99 10E6/UL (ref 4.4–5.9)
SODIUM SERPL-SCNC: 140 MMOL/L (ref 133–144)
SPECIMEN VOL UR: 2290 ML
T CELL EXTENDED COMMENT: ABNORMAL
TOTAL PROTEIN SERUM FOR ELP: 6 G/DL (ref 6.8–8.8)
URATE SERPL-MCNC: 2.9 MG/DL (ref 3.5–7.2)
WBC # BLD AUTO: 5.2 10E3/UL (ref 4–11)

## 2022-05-09 PROCEDURE — 84165 PROTEIN E-PHORESIS SERUM: CPT | Mod: TC | Performed by: STUDENT IN AN ORGANIZED HEALTH CARE EDUCATION/TRAINING PROGRAM

## 2022-05-09 PROCEDURE — 88185 FLOWCYTOMETRY/TC ADD-ON: CPT | Performed by: PHYSICIAN ASSISTANT

## 2022-05-09 PROCEDURE — G0463 HOSPITAL OUTPT CLINIC VISIT: HCPCS

## 2022-05-09 PROCEDURE — 84155 ASSAY OF PROTEIN SERUM: CPT | Mod: 91 | Performed by: PHYSICIAN ASSISTANT

## 2022-05-09 PROCEDURE — 88188 FLOWCYTOMETRY/READ 9-15: CPT | Mod: GC | Performed by: STUDENT IN AN ORGANIZED HEALTH CARE EDUCATION/TRAINING PROGRAM

## 2022-05-09 PROCEDURE — 83735 ASSAY OF MAGNESIUM: CPT | Performed by: INTERNAL MEDICINE

## 2022-05-09 PROCEDURE — 84165 PROTEIN E-PHORESIS SERUM: CPT | Mod: 26

## 2022-05-09 PROCEDURE — 88271 CYTOGENETICS DNA PROBE: CPT | Performed by: PHYSICIAN ASSISTANT

## 2022-05-09 PROCEDURE — 88311 DECALCIFY TISSUE: CPT | Mod: TC | Performed by: PHYSICIAN ASSISTANT

## 2022-05-09 PROCEDURE — 88341 IMHCHEM/IMCYTCHM EA ADD ANTB: CPT | Mod: 26 | Performed by: STUDENT IN AN ORGANIZED HEALTH CARE EDUCATION/TRAINING PROGRAM

## 2022-05-09 PROCEDURE — 82784 ASSAY IGA/IGD/IGG/IGM EACH: CPT | Performed by: PHYSICIAN ASSISTANT

## 2022-05-09 PROCEDURE — 85060 BLOOD SMEAR INTERPRETATION: CPT | Performed by: STUDENT IN AN ORGANIZED HEALTH CARE EDUCATION/TRAINING PROGRAM

## 2022-05-09 PROCEDURE — 86334 IMMUNOFIX E-PHORESIS SERUM: CPT | Performed by: PATHOLOGY

## 2022-05-09 PROCEDURE — 88311 DECALCIFY TISSUE: CPT | Mod: 26 | Performed by: STUDENT IN AN ORGANIZED HEALTH CARE EDUCATION/TRAINING PROGRAM

## 2022-05-09 PROCEDURE — 86355 B CELLS TOTAL COUNT: CPT | Performed by: INTERNAL MEDICINE

## 2022-05-09 PROCEDURE — 84166 PROTEIN E-PHORESIS/URINE/CSF: CPT | Performed by: PATHOLOGY

## 2022-05-09 PROCEDURE — 81050 URINALYSIS VOLUME MEASURE: CPT | Performed by: PATHOLOGY

## 2022-05-09 PROCEDURE — 80053 COMPREHEN METABOLIC PANEL: CPT | Performed by: INTERNAL MEDICINE

## 2022-05-09 PROCEDURE — 86335 IMMUNFIX E-PHORSIS/URINE/CSF: CPT | Mod: 26

## 2022-05-09 PROCEDURE — 84166 PROTEIN E-PHORESIS/URINE/CSF: CPT | Mod: 26

## 2022-05-09 PROCEDURE — 88184 FLOWCYTOMETRY/ TC 1 MARKER: CPT | Performed by: STUDENT IN AN ORGANIZED HEALTH CARE EDUCATION/TRAINING PROGRAM

## 2022-05-09 PROCEDURE — 86335 IMMUNFIX E-PHORSIS/URINE/CSF: CPT | Performed by: PATHOLOGY

## 2022-05-09 PROCEDURE — 99207 PR NO BILLABLE SERVICE THIS VISIT: CPT | Performed by: STUDENT IN AN ORGANIZED HEALTH CARE EDUCATION/TRAINING PROGRAM

## 2022-05-09 PROCEDURE — 85610 PROTHROMBIN TIME: CPT

## 2022-05-09 PROCEDURE — 83521 IG LIGHT CHAINS FREE EACH: CPT

## 2022-05-09 PROCEDURE — 88237 TISSUE CULTURE BONE MARROW: CPT | Performed by: PHYSICIAN ASSISTANT

## 2022-05-09 PROCEDURE — 88305 TISSUE EXAM BY PATHOLOGIST: CPT | Mod: 26 | Performed by: STUDENT IN AN ORGANIZED HEALTH CARE EDUCATION/TRAINING PROGRAM

## 2022-05-09 PROCEDURE — 38222 DX BONE MARROW BX & ASPIR: CPT | Mod: RT

## 2022-05-09 PROCEDURE — 36415 COLL VENOUS BLD VENIPUNCTURE: CPT | Performed by: INTERNAL MEDICINE

## 2022-05-09 PROCEDURE — 86334 IMMUNOFIX E-PHORESIS SERUM: CPT | Mod: 26

## 2022-05-09 PROCEDURE — 84550 ASSAY OF BLOOD/URIC ACID: CPT | Performed by: INTERNAL MEDICINE

## 2022-05-09 PROCEDURE — 83615 LACTATE (LD) (LDH) ENZYME: CPT | Performed by: INTERNAL MEDICINE

## 2022-05-09 PROCEDURE — 38221 DX BONE MARROW BIOPSIES: CPT | Performed by: STUDENT IN AN ORGANIZED HEALTH CARE EDUCATION/TRAINING PROGRAM

## 2022-05-09 PROCEDURE — 84156 ASSAY OF PROTEIN URINE: CPT

## 2022-05-09 PROCEDURE — 84100 ASSAY OF PHOSPHORUS: CPT | Performed by: INTERNAL MEDICINE

## 2022-05-09 PROCEDURE — 85025 COMPLETE CBC W/AUTO DIFF WBC: CPT | Performed by: INTERNAL MEDICINE

## 2022-05-09 PROCEDURE — 82306 VITAMIN D 25 HYDROXY: CPT | Performed by: INTERNAL MEDICINE

## 2022-05-09 PROCEDURE — 88342 IMHCHEM/IMCYTCHM 1ST ANTB: CPT | Mod: 26 | Performed by: STUDENT IN AN ORGANIZED HEALTH CARE EDUCATION/TRAINING PROGRAM

## 2022-05-09 PROCEDURE — 85097 BONE MARROW INTERPRETATION: CPT | Performed by: STUDENT IN AN ORGANIZED HEALTH CARE EDUCATION/TRAINING PROGRAM

## 2022-05-09 PROCEDURE — 88305 TISSUE EXAM BY PATHOLOGIST: CPT | Mod: TC | Performed by: PHYSICIAN ASSISTANT

## 2022-05-09 RX ORDER — ACETAMINOPHEN 325 MG/1
975 TABLET ORAL ONCE
Status: COMPLETED | OUTPATIENT
Start: 2022-05-09 | End: 2022-05-09

## 2022-05-09 RX ORDER — LIDOCAINE 40 MG/G
CREAM TOPICAL
Status: DISCONTINUED | OUTPATIENT
Start: 2022-05-09 | End: 2022-05-10 | Stop reason: HOSPADM

## 2022-05-09 RX ORDER — LIDOCAINE 40 MG/G
CREAM TOPICAL
Status: CANCELLED | OUTPATIENT
Start: 2022-05-09

## 2022-05-09 RX ORDER — SODIUM CHLORIDE, SODIUM LACTATE, POTASSIUM CHLORIDE, CALCIUM CHLORIDE 600; 310; 30; 20 MG/100ML; MG/100ML; MG/100ML; MG/100ML
INJECTION, SOLUTION INTRAVENOUS CONTINUOUS
Status: DISCONTINUED | OUTPATIENT
Start: 2022-05-09 | End: 2022-05-10 | Stop reason: HOSPADM

## 2022-05-09 RX ORDER — LIDOCAINE HYDROCHLORIDE 20 MG/ML
INJECTION, SOLUTION INFILTRATION; PERINEURAL PRN
Status: DISCONTINUED | OUTPATIENT
Start: 2022-05-09 | End: 2022-05-09

## 2022-05-09 RX ORDER — ONDANSETRON 2 MG/ML
4 INJECTION INTRAMUSCULAR; INTRAVENOUS EVERY 30 MIN PRN
Status: DISCONTINUED | OUTPATIENT
Start: 2022-05-09 | End: 2022-05-10 | Stop reason: HOSPADM

## 2022-05-09 RX ORDER — FENTANYL CITRATE 50 UG/ML
25 INJECTION, SOLUTION INTRAMUSCULAR; INTRAVENOUS
Status: DISCONTINUED | OUTPATIENT
Start: 2022-05-09 | End: 2022-05-10 | Stop reason: HOSPADM

## 2022-05-09 RX ORDER — PROPOFOL 10 MG/ML
INJECTION, EMULSION INTRAVENOUS PRN
Status: DISCONTINUED | OUTPATIENT
Start: 2022-05-09 | End: 2022-05-09

## 2022-05-09 RX ORDER — ONDANSETRON 4 MG/1
4 TABLET, ORALLY DISINTEGRATING ORAL EVERY 30 MIN PRN
Status: DISCONTINUED | OUTPATIENT
Start: 2022-05-09 | End: 2022-05-10 | Stop reason: HOSPADM

## 2022-05-09 RX ORDER — HEPARIN SODIUM (PORCINE) LOCK FLUSH IV SOLN 100 UNIT/ML 100 UNIT/ML
3 SOLUTION INTRAVENOUS ONCE
Status: DISCONTINUED | OUTPATIENT
Start: 2022-05-09 | End: 2022-05-10 | Stop reason: HOSPADM

## 2022-05-09 RX ORDER — FENTANYL CITRATE 50 UG/ML
25 INJECTION, SOLUTION INTRAMUSCULAR; INTRAVENOUS EVERY 5 MIN PRN
Status: DISCONTINUED | OUTPATIENT
Start: 2022-05-09 | End: 2022-05-10 | Stop reason: HOSPADM

## 2022-05-09 RX ORDER — LIDOCAINE HYDROCHLORIDE 10 MG/ML
8-10 INJECTION, SOLUTION EPIDURAL; INFILTRATION; INTRACAUDAL; PERINEURAL
Status: DISCONTINUED | OUTPATIENT
Start: 2022-05-09 | End: 2022-05-10 | Stop reason: HOSPADM

## 2022-05-09 RX ORDER — HYDROMORPHONE HYDROCHLORIDE 1 MG/ML
0.2 INJECTION, SOLUTION INTRAMUSCULAR; INTRAVENOUS; SUBCUTANEOUS EVERY 5 MIN PRN
Status: DISCONTINUED | OUTPATIENT
Start: 2022-05-09 | End: 2022-05-10 | Stop reason: HOSPADM

## 2022-05-09 RX ORDER — PROPOFOL 10 MG/ML
INJECTION, EMULSION INTRAVENOUS CONTINUOUS PRN
Status: DISCONTINUED | OUTPATIENT
Start: 2022-05-09 | End: 2022-05-09

## 2022-05-09 RX ORDER — LIDOCAINE HYDROCHLORIDE 10 MG/ML
8-10 INJECTION, SOLUTION EPIDURAL; INFILTRATION; INTRACAUDAL; PERINEURAL
Status: CANCELLED | OUTPATIENT
Start: 2022-05-09

## 2022-05-09 RX ORDER — OXYCODONE HYDROCHLORIDE 5 MG/1
5 TABLET ORAL EVERY 4 HOURS PRN
Status: DISCONTINUED | OUTPATIENT
Start: 2022-05-09 | End: 2022-05-10 | Stop reason: HOSPADM

## 2022-05-09 RX ADMIN — LIDOCAINE HYDROCHLORIDE 60 MG: 20 INJECTION, SOLUTION INFILTRATION; PERINEURAL at 11:45

## 2022-05-09 RX ADMIN — SODIUM CHLORIDE, SODIUM LACTATE, POTASSIUM CHLORIDE, CALCIUM CHLORIDE: 600; 310; 30; 20 INJECTION, SOLUTION INTRAVENOUS at 11:40

## 2022-05-09 RX ADMIN — PROPOFOL 40 MG: 10 INJECTION, EMULSION INTRAVENOUS at 11:46

## 2022-05-09 RX ADMIN — PROPOFOL 200 MCG/KG/MIN: 10 INJECTION, EMULSION INTRAVENOUS at 11:45

## 2022-05-09 RX ADMIN — ACETAMINOPHEN 975 MG: 325 TABLET ORAL at 11:03

## 2022-05-09 ASSESSMENT — LIFESTYLE VARIABLES: TOBACCO_USE: 0

## 2022-05-09 ASSESSMENT — ENCOUNTER SYMPTOMS
SEIZURES: 0
DYSRHYTHMIAS: 0

## 2022-05-09 ASSESSMENT — PAIN SCALES - GENERAL: PAINLEVEL: NO PAIN (0)

## 2022-05-09 NOTE — ANESTHESIA CARE TRANSFER NOTE
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    Anesthesia Type:   MAC     Note:    Oropharynx: oropharynx clear of all foreign objects  Level of Consciousness: awake  Oxygen Supplementation: room air    Independent Airway: airway patency satisfactory and stable  Dentition: dentition unchanged  Vital Signs Stable: post-procedure vital signs reviewed and stable  Report to RN Given: handoff report given  Patient transferred to: Phase II  Comments: VSS and WNL, comfortable, no PONV, report to Ana Cristina RN  Handoff Report: Identifed the Patient, Identified the Reponsible Provider, Reviewed the pertinent medical history, Discussed the surgical course, Reviewed Intra-OP anesthesia mangement and issues during anesthesia, Set expectations for post-procedure period and Allowed opportunity for questions and acknowledgement of understanding      Vitals:  Vitals Value Taken Time   BP     Temp     Pulse     Resp     SpO2         Electronically Signed By: PATTY Sanz CRNA  May 9, 2022  12:05 PM

## 2022-05-09 NOTE — PROGRESS NOTES
BMT ONC Adult Bone Marrow Biopsy Procedure Note  May 9, 2022   VSS    Learning needs assessment complete within 12 months? YES    DIAGNOSIS: multiple myeloma     PROCEDURE: Unilateral Bone Marrow Biopsy and Unilateral Aspirate    LOCATION: [facility] 5th floor-Procedure Room    Patient s identification was positively verified by verbal identification and invasive procedure safety checklist was completed. Informed consent was obtained. Following the administration of Propofol as pre-medication, patient was placed in the prone position and prepped and draped in a sterile manner. Approximately 10 cc of 1% Lidocaine was used over the right posterior iliac spine. Following this a 3 mm incision was made. Trephine bone marrow core(s) was (were) obtained from the [facility]. Bone marrow aspirates were obtained from the [facility]. Aspirates were sent for morphology, immunophenotyping, cytogenetics and molecular diagnostics RFLP. A total of approximately 20 ml of marrow was aspirated. Following this procedure a sterile dressing was applied to the bone marrow biopsy site(s). The patient was placed in the supine position to maintain pressure on the biopsy site. Post-procedure wound care instructions were given.     Complications: NO    Pre-procedural pain: 0 out of 10 on the numeric pain rating scale.     Procedural pain: 0 out of 10 on the numeric pain rating scale, pt well sedated    Post-procedural pain assessment: 0 out of 10 on the numeric pain rating scale.     Interventions: NO    Length of procedure:20 minutes or less      Procedure performed by: Deborah Leblanc PAC  [phone]

## 2022-05-09 NOTE — NURSING NOTE
Chief Complaint   Patient presents with     Oncology Clinic Visit     Multiple myeloma      Blood Draw     Vitals, blood drawn and PIV placed by LPN. Pt checked into appt.      KRYSTA Castañeda LPN

## 2022-05-09 NOTE — ANESTHESIA PREPROCEDURE EVALUATION
Anesthesia Pre-Procedure Evaluation    Patient: June Ronquillo   MRN: [id number] : 1970        Preoperative Diagnosis: Multiple myeloma not having achieved remission (H) [C90.00]    Procedure : Procedure(s):  NON VALVED TUNNELED DUAL LUMEN APHARESIS CAPABLE LINE INSERTION, VASCULAR ACCESS CATHETER          Past Medical History:   Diagnosis Date     CAD (coronary artery disease)     s/p stent to CFX     Heart attack (H)      Hyperlipidemia LDL goal <100      Hypertension      Stented coronary artery      Thyroid disease       Past Surgical History:   Procedure Laterality Date     BONE MARROW BIOPSY, BONE SPECIMEN, NEEDLE/TROCAR Left 2021    Procedure: BIOPSY, BONE MARROW with aspiration and ancillary studies;  Surgeon: Niharika Regalado MD;  Location: [facility] OR     BONE MARROW BIOPSY, BONE SPECIMEN, NEEDLE/TROCAR Left 10/14/2021    Procedure: BIOPSY, BONE MARROW;  Surgeon: Alexa Albert APRN [facility];  Location: [facility] OR     BONE MARROW BIOPSY, BONE SPECIMEN, NEEDLE/TROCAR Right 3/7/2022    Procedure: BIOPSY, BONE MARROW;  Surgeon: Deborah Carmona PA-C;  Location: [facility] OR     HEART CATH PTCA SINGLE VESSEL  10/10/2004    Stent to CFX - [facility]      INSERT CATHETER VASCULAR ACCESS Right 11/3/2021    Procedure: NON VALVED TUNNELED DUAL LUMEN APHARESIS CAPABLE LINE INSERTION, VASCULAR ACCESS CATHETER;  Surgeon: Werner Mcnamara MD;  Location: [facility] OR     IR CVC TUNNEL PLACEMENT > 5 YRS OF AGE  11/3/2021     IR CVC TUNNEL REMOVAL RIGHT  2021     THYROIDECTOMY N/A 5/3/2022    Procedure: total thyroidectomy, Left selective neck dissection level 6 and level 7 ;  Surgeon: Clarita Sepulveda MD;  Location: [facility] OR      Allergies   Allergen Reactions     Blood Transfusion Related (Informational Only) Other (See Comments)     Patient has a history of a clinically significant antibody against RBC antigens.  A delay in compatible RBCs may occur.       Social History     Tobacco Use      Smoking status: Never Smoker     Smokeless tobacco: Never Used   Substance Use Topics     Alcohol use: Not Currently      Wt Readings from Last 1 Encounters:   05/09/22 73.5 kg (162 lb 1.6 oz)        Anesthesia Evaluation   Pt has had prior anesthetic. Type: MAC.    No history of anesthetic complications       ROS/MED HX  ENT/Pulmonary:    (-) tobacco use, asthma and sleep apnea   Neurologic:    (-) no seizures and no CVA   Cardiovascular:     (+) hypertension--CAD --stent (prior stent to CFX)-Drug Eluting Stent.  (-) taking anticoagulants/antiplatelets, arrhythmias and murmur   METS/Exercise Tolerance: >4 METS    Hematologic:    (-) anemia   Musculoskeletal:   (+) fracture, Fractures: Previous rib fractures prompting eval for MM, but no current fractures.     GI/Hepatic:    (-) GERD and liver disease   Renal/Genitourinary:    (-) renal disease   Endo:     (+) thyroid problem, hypothyroidism,     Psychiatric/Substance Use:    (-) psychiatric history   Infectious Disease:       Malignancy:   (+) Malignancy (Multiple myeloma, normal calcium, prior fractures prompted eval), History of Lymphoma/Leukemia.Lymph CA Active status post.        Other:      (+) , no H/O Chronic Pain,        Physical Exam    Airway        Mallampati: II   TM distance: > 3 FB   Neck ROM: full   Mouth opening: > 3 cm    Respiratory Devices and Support         Dental  no notable dental history         Cardiovascular          Rhythm and rate: regular and normal (-) no murmur    Pulmonary           breath sounds clear to auscultation           OUTSIDE LABS:  CBC:   Lab Results   Component Value Date    WBC 5.2 05/09/2022    WBC 5.7 04/22/2022    HGB 12.0 (L) 05/09/2022    HGB 12.4 (L) 04/22/2022    HCT 35.9 (L) 05/09/2022    HCT 37.9 (L) 04/22/2022     05/09/2022     04/22/2022     BMP:   Lab Results   Component Value Date     05/09/2022     04/22/2022    POTASSIUM 4.0 05/09/2022    POTASSIUM 4.3 04/22/2022    CHLORIDE 106  05/09/2022    CHLORIDE 109 04/22/2022    CO2 26 05/09/2022    CO2 25 04/22/2022    BUN 13 05/09/2022    BUN 16 04/22/2022    CR 0.99 05/09/2022    CR 1.02 04/22/2022     (H) 05/09/2022     (H) 04/22/2022     COAGS:   Lab Results   Component Value Date    PTT 41 (H) 11/22/2021    INR 0.89 05/09/2022    FIBR 485 11/21/2021     POC: No results found for: BGM, HCG, HCGS  HEPATIC:   Lab Results   Component Value Date    ALBUMIN 3.8 05/09/2022    PROTTOTAL 6.5 (L) 05/09/2022    ALT 27 05/09/2022    AST 26 05/09/2022    ALKPHOS 54 05/09/2022    BILITOTAL 0.4 05/09/2022     OTHER:   Lab Results   Component Value Date    LACT 1.9 11/22/2021    A1C 6.0 (H) 01/25/2021    HARDEEP 9.3 05/09/2022    PHOS 3.0 05/09/2022    MAG 1.8 05/09/2022    TSH 1.55 11/14/2021    T4 0.97 10/20/2021    CRP <2.9 04/30/2021       Anesthesia Plan    ASA Status:  3   NPO Status:  NPO Appropriate    Anesthesia Type: MAC.     - Reason for MAC: immobility needed   Induction: Propofol.           Consents    Anesthesia Plan(s) and associated risks, benefits, and realistic alternatives discussed. Questions answered and patient/representative(s) expressed understanding.    - Discussed:     - Discussed with:  Patient      - Specific Concerns: PONV, post op pain, possibility of conversion to general with airway instrumentation and associated risks.     - Extended Intubation/Ventilatory Support Discussed: No.      - Patient is DNR/DNI Status: No    Use of blood products discussed: No .     Postoperative Care    Pain management: IV analgesics, Oral pain medications.   PONV prophylaxis: Background Propofol Infusion, Ondansetron (or other 5HT-3)     Comments:    Other Comments: Prior uncomplicated anesthetics for bone marrow biopsies. Discussed plan for same.        H&P reviewed: Unable to attach H&P to encounter due to EHR limitations. H&P Update: appropriate H&P reviewed, patient examined. No interval changes since H&P (within 30 days).              Orlando Carranza MD

## 2022-05-09 NOTE — PROGRESS NOTES
Patient Name: June Ronquillo  Patient MRN: [id number]  Patient : 1970    Abbreviated H&P and Pre-sedation Assessment for bone marrow biopsy (procedure name) with sedation    Chief complaint and/or reason for Procedure: multiple myeloma    Planned level of sedation: General anesthesia    History of problems with sedation: (patient or family hx): No    ASA Assessment Category: 2 - Mild systemic disease    History of sleep apnea: No    History of blood thinners: No     Appropriate NPO status: Yes, 1130pm .     Current tobacco use: No    Any recent fever, cough, chest or sinus congestion, SOB, weight loss, chest pain, diarrhea or constipation. No    Medications   Currently Scheduled Medications       Home Med List)  (Not in a hospital admission)      Allergies  Blood transfusion related (informational only)    PMH:  Past Medical History:   Diagnosis Date     CAD (coronary artery disease)     s/p stent to CFX     Heart attack (H)      Hyperlipidemia LDL goal <100      Hypertension      Stented coronary artery      Thyroid disease        Past Surgical History:   Procedure Laterality Date     BONE MARROW BIOPSY, BONE SPECIMEN, NEEDLE/TROCAR Left 2021    Procedure: BIOPSY, BONE MARROW with aspiration and ancillary studies;  Surgeon: Niharika Regalado MD;  Location: [facility] OR     BONE MARROW BIOPSY, BONE SPECIMEN, NEEDLE/TROCAR Left 10/14/2021    Procedure: BIOPSY, BONE MARROW;  Surgeon: Alexa Albert APRN [facility];  Location: [facility] OR     BONE MARROW BIOPSY, BONE SPECIMEN, NEEDLE/TROCAR Right 3/7/2022    Procedure: BIOPSY, BONE MARROW;  Surgeon: Deborah Carmona PA-C;  [place] [facility] [place]     HEART CATH PTCA SINGLE VESSEL  10/10/2004    Stent to X - [facility]      INSERT CATHETER VASCULAR ACCESS Right 11/3/2021    Procedure: NON VALVED TUNNELED DUAL LUMEN APHARESIS CAPABLE LINE INSERTION, VASCULAR ACCESS CATHETER;  Surgeon: Werner Mcnamara MD;  Location: [facility] OR     IR CVC TUNNEL  PLACEMENT > 5 YRS OF AGE  11/3/2021     IR CVC TUNNEL REMOVAL RIGHT  12/2/2021     THYROIDECTOMY N/A 5/3/2022    Procedure: total thyroidectomy, Left selective neck dissection level 6 and level 7 ;  Surgeon: Clarita Sepulveda MD;  Location: [facility] OR       Focused Physical exam pertinent to procedure:          (Details of heart, lung, ASA assessment and mallampati assessment in pre procedure assessment flowsheet)  General- healthy,alert,no distress   Recent vital signs-  /80   Pulse 80   Temp 98.5  F (36.9  C) (Oral)   Resp 16   Wt 73.5 kg (162 lb 1.6 oz)   SpO2 100%   BMI 26.56 kg/m    HEART-regular rate and rhythm and no murmurs, gallops, or rub  LUNGS-Clear to Ausculation  OROPHARYNGEAL - MALLAMPATTI- Class II (visualization of the soft palate, fauces, and uvula)    A/P:Reviewed history, medications, allergies, clinical information and pre procedure assessment. The patient was informed of the risks and benefits of the procedure.  They would like to proceed.  June Ronquillo is approved for use of sedation during their procedure as noted above.      Deborah Leblanc, PA-C  [phone]

## 2022-05-09 NOTE — LETTER
2022     RE: June Ronquillo  [address] [facility] [address]    Dear Colleague,    Thank you for referring your patient, June Ronquillo, to the [facility] BLOOD AND MARROW TRANSPLANT PRO[facility] [place]. Please see a copy of my visit note below.    Patient Name: June Ronquillo  Patient MRN: [id number]  Patient : 1970    Abbreviated H&P and Pre-sedation Assessment for bone marrow biopsy (procedure name) with sedation    Chief complaint and/or reason for Procedure: multiple myeloma    Planned level of sedation: General anesthesia    History of problems with sedation: (patient or family hx): No    ASA Assessment Category: 2 - Mild systemic disease    History of sleep apnea: No    History of blood thinners: No     Appropriate NPO status: Yes, 1130pm .     Current tobacco use: No    Any recent fever, cough, chest or sinus congestion, SOB, weight loss, chest pain, diarrhea or constipation. No    Medications   Currently Scheduled Medications       Home Med List)  (Not in a hospital admission)    Allergies  Blood transfusion related (informational only)    PMH:  Past Medical History:   Diagnosis Date     CAD (coronary artery disease)     s/p stent to CFX     Heart attack (H)      Hyperlipidemia LDL goal <100      Hypertension      Stented coronary artery      Thyroid disease        Past Surgical History:   Procedure Laterality Date     BONE MARROW BIOPSY, BONE SPECIMEN, NEEDLE/TROCAR Left 2021    Procedure: BIOPSY, BONE MARROW with aspiration and ancillary studies;  Surgeon: Niharika Regalado MD;  Location:  OR     BONE MARROW BIOPSY, BONE SPECIMEN, NEEDLE/TROCAR Left 10/14/2021    Procedure: BIOPSY, BONE MARROW;  Surgeon: Alexa Albert APRN CNP;  [place] [facility] [place]     BONE MARROW BIOPSY, BONE SPECIMEN, NEEDLE/TROCAR Right 3/7/2022    Procedure: BIOPSY, BONE MARROW;  Surgeon: Deborah Carmona PA-C;  Location: [facility] OR     HEART CATH PTCA SINGLE VESSEL   10/10/2004    Stent to CFX - [facility]      INSERT CATHETER VASCULAR ACCESS Right 11/3/2021    Procedure: NON VALVED TUNNELED DUAL LUMEN APHARESIS CAPABLE LINE INSERTION, VASCULAR ACCESS CATHETER;  Surgeon: Werner Mcnamara MD;  Location: [facility] OR     IR CVC TUNNEL PLACEMENT > 5 YRS OF AGE  11/3/2021     IR CVC TUNNEL REMOVAL RIGHT  12/2/2021     THYROIDECTOMY N/A 5/3/2022    Procedure: total thyroidectomy, Left selective neck dissection level 6 and level 7 ;  Surgeon: Clarita Sepulveda MD;  Location: [facility] OR     Focused Physical exam pertinent to procedure:          (Details of heart, lung, ASA assessment and mallampati assessment in pre procedure assessment flowsheet)  General- healthy,alert,no distress   Recent vital signs-  /80   Pulse 80   Temp 98.5  F (36.9  C) (Oral)   Resp 16   Wt 73.5 kg (162 lb 1.6 oz)   SpO2 100%   BMI 26.56 kg/m    HEART-regular rate and rhythm and no murmurs, gallops, or rub  LUNGS-Clear to Ausculation  OROPHARYNGEAL - MALLAMPATTI- Class II (visualization of the soft palate, fauces, and uvula)    A/P:Reviewed history, medications, allergies, clinical information and pre procedure assessment. The patient was informed of the risks and benefits of the procedure.  They would like to proceed.  June Ronquillo is approved for use of sedation during their procedure as noted above.      Deborah Leblanc, PA-C  [phone]

## 2022-05-09 NOTE — ANESTHESIA POSTPROCEDURE EVALUATION
Patient: June Ronquillo    Procedure: Procedure(s):  BIOPSY, BONE MARROW       Anesthesia Type:  MAC    Note:  Disposition: Outpatient   Postop Pain Control: Uneventful            Sign Out: Well controlled pain   PONV: No   Neuro/Psych: Uneventful            Sign Out: Acceptable/Baseline neuro status   Airway/Respiratory: Uneventful            Sign Out: Acceptable/Baseline resp. status   CV/Hemodynamics: Uneventful            Sign Out: Acceptable CV status; No obvious hypovolemia; No obvious fluid overload   Other NRE: NONE   DID A NON-ROUTINE EVENT OCCUR? No           Last vitals:  Vitals Value Taken Time   /83 05/09/22 1221   Temp 36.7  C (98  F) 05/09/22 1221   Pulse     Resp 16 05/09/22 1221   SpO2 100 % 05/09/22 1221       Electronically Signed By: Orlando Carranza MD  May 9, 2022  12:53 PM

## 2022-05-09 NOTE — DISCHARGE INSTRUCTIONS
How to Care for your Bone Marrow Biopsy    Activity  Relax and take it easy for the next 24 hours.   Resume regular activity after 24 hours.    Diet   Resume pre-procedure diet and drink plenty of fluids.    If you received sedation, you may feel a little nauseated so start with a clear liquid diet until the nausea passes.    Do Not Immerse Bone Marrow Biopsy Puncture Site in Water  Do not take a bath until the puncture site has healed.  Do not sit in a hot tub or spa until the puncture site has healed.  Do not swim until the puncture site has healed.  Wait 24 hours before taking a shower.    Drainage  Drainage should be minimal.  IF bleeding should occur and soaks through the dressing, lie down and put pressure on the puncture site.    IF bleeding persists, apply gentle pressure with your hand over the dressing for 5 minutes.    IF the pressure doesn't stop the bleeding, contact your provider immediately.    Dressing  Keep the dressing dry and in place for 24 hours, unless instructed otherwise.    IF bleeding soaks through the dressing in the first 24 hours do NOT remove the dressing as you may pull off any scab that has formed.  Instead, reinforce the dressing with extra gauze and tape.    No Alcohol  Do not drink alcoholic beverages for the next 24 hours.    No Driving or Operating Machinery  No driving or operating machinery for the next 24 hours.    Notify your provider IF:    Excessive bleeding or drainage at the puncture site    Excessive swelling, redness or tenderness at the puncture site    Fever above 100.5 degrees taken orally    Severe pain    Drainage that is green, yellow, thick white or has a bad odor    Telephone Numbers  Bone Marrow transplant clinic:  [phone] (Monday thru Friday, 8:00 am to 4:00 pm)  After business hours call the [facility]:  [phone] and ask for the Hematology/BMT doctor on call.  Or call the Emergency Room at the [facility]  [facility]:  [phone].      [facility]  Home Care Following Anesthesia  For 24 hours after surgery:  Get plenty of rest.  A responsible adult must stay with you for at least 24 hours after you leave the surgery center.  Do not drive or use heavy equipment.  If you have weakness or tingling, don't drive or use heavy equipment until this feeling goes away.   Do not drink alcohol.   Avoid strenuous or risky activities.  Ask for help when climbing stairs.  You may feel lightheaded.  IF so, sit for a few minutes before standing.  Have someone help you get up.   If you have nausea (feel sick to your stomach): Drink only clear liquids such as apple juice, ginger ale, broth or 7-Up.  Rest may also help.  Be sure to drink enough fluids.  Move to a regular diet as you feel able.   You may have a slight fever.  Call the doctor if your fever is over 100 F (37.7 C) (taken under the tongue) or lasts longer than 24 hours.  You may have a dry mouth, a sore throat, muscle aches or trouble sleeping. These should go away after 24 hours.  Do not make important or legal decisions.   It is recommended to avoid smoking.               Tips for taking pain medications  To get the best pain relief possible, remember these points:  Take pain medications as directed, before pain becomes severe.  Pain medication can upset your stomach: taking it with food may help.  Constipation is a common side effect of pain medication. Drink plenty of  fluids.  Eat foods high in fiber. Take a stool softener if recommended by your doctor or pharmacist.  Do not drink alcohol, drive or operate machinery while taking pain medications.  Ask about other ways to control pain, such as with heat, ice or relaxation.    Tylenol/Acetaminophen Consumption  To help encourage the safe use of acetaminophen, the makers of TYLENOL  have lowered the maximum daily dose for single-ingredient Extra Strength TYLENOL  (acetaminophen)  products sold in the [place] from 8 pills per day (4,000 mg) to 6 pills per day (3,000 mg). The dosing interval has also changed from 2 pills every 4-6 hours to 2 pills every 6 hours.  If you feel your pain relief is insufficient, you may take Tylenol/Acetaminophen in addition to your narcotic pain medication.   Be careful not to exceed 3,000 mg of Tylenol/Acetaminophen in a 24 hour period from all sources.  If you are taking extra strength Tylenol/acetaminophen (500 mg), the maximum dose is 6 tablets in 24 hours.  If you are taking regular strength acetaminophen (325 mg), the maximum dose is 9 tablets in 24 hours.    Call a doctor for any of the following:  Signs of infection (fever, growing tenderness at the surgery site, a large amount of drainage or bleeding, severe pain, foul-smelling drainage, redness, swelling).  It has been over 8 to 10 hours since surgery and you are still not able to urinate (pass water).  Headache for over 24 hours.  Numbness, tingling or weakness the day after surgery (if you had spinal anesthesia).  Signs of Covid-19 infection (temperature over 100 degrees, shortness of breath, cough, loss of taste/smell, generalized body aches, persistent headache, chills, sore throat, nausea/vomiting/diarrhea)  Your doctor is:                         Calvin bruno [phone] and ask for the resident on call for [facility]  For emergency care, call the:  [facility]:  [phone] (TTY for hearing impaired: [phone])

## 2022-05-09 NOTE — ADDENDUM NOTE
Addendum  created 05/09/22 1254 by Orlando Carranza MD    Attestation recorded in Intraprocedure, Intraprocedure Attestations filed

## 2022-05-09 NOTE — NURSING NOTE
"Oncology Rooming Note    May 9, 2022 9:40 AM   June Ronquillo is a 51 year old male who presents for:    Chief Complaint   Patient presents with     Oncology Clinic Visit     Multiple myeloma      Initial Vitals: /80   Pulse 80   Temp 98.5  F (36.9  C) (Oral)   Resp 16   Wt 73.5 kg (162 lb 1.6 oz)   SpO2 100%   BMI 26.56 kg/m   Estimated body mass index is 26.56 kg/m  as calculated from the following:    Height as of 5/3/22: 1.664 m (5' 5.5\").    Weight as of this encounter: 73.5 kg (162 lb 1.6 oz). Body surface area is 1.84 meters squared.  No Pain (0) Comment: Data Unavailable   No LMP for male patient.  Allergies reviewed: Yes  Medications reviewed: Yes    Medications: Medication refills not needed today.  Pharmacy name entered into [employer]:    [facility] - [place], [place] - [address]  [facility] - [place], [place] - [address]    Clinical concerns: Has a rash on Left thigh for about a month and nothing seems to help it go away       CAITLIN Oliva LPN            "

## 2022-05-10 DIAGNOSIS — Z00.6 RESEARCH STUDY PATIENT: Primary | ICD-10-CM

## 2022-05-10 DIAGNOSIS — Z94.84 HISTORY OF PERIPHERAL STEM CELL TRANSPLANT (H): ICD-10-CM

## 2022-05-10 LAB
ALPHA1 GLOB MFR UR ELPH: 0 %
ALPHA2 GLOB MFR UR ELPH: 0 %
B-GLOBULIN MFR UR ELPH: 0 %
GAMMA GLOB MFR UR ELPH: 0 %
IGA SERPL-MCNC: 32 MG/DL (ref 84–499)
IGG SERPL-MCNC: 319 MG/DL (ref 610–1616)
IGM SERPL-MCNC: 17 MG/DL (ref 35–242)
INTERPRETATION: NORMAL
M PROTEIN MFR UR ELPH: 0 %
PATH REPORT.COMMENTS IMP SPEC: NORMAL
PATH REPORT.FINAL DX SPEC: NORMAL
PATH REPORT.MICROSCOPIC SPEC OTHER STN: NORMAL
PATH REPORT.RELEVANT HX SPEC: NORMAL
PROT ELPH PNL UR ELPH: NORMAL
PROT PATTERN SERPL IFE-IMP: NORMAL
PROT PATTERN UR ELPH-IMP: NORMAL

## 2022-05-10 PROCEDURE — 81445 SO NEO GSAP 5-50DNA/DNA&RNA: CPT | Performed by: SURGERY

## 2022-05-11 NOTE — OP NOTE
Procedure Date: 05/03/2022    PREOPERATIVE DIAGNOSIS:  Papillary thyroid carcinoma.    POSTOPERATIVE DIAGNOSIS:  Papillary thyroid carcinoma.    SURGICAL PROCEDURE PERFORMED:  Total thyroidectomy and left selective neck dissection to include level 6 and 7.    SURGEON:  Clarita Sepulveda MD    ASSISTANT:  Tato Gr MD    ANESTHESIA:  General endotracheal with nerve monitoring endotracheal tube.    COMPLICATIONS:  None.    ESTIMATED BLOOD LOSS:  Less than 5 mL.    CLINICAL INDICATIONS FOR THE PROCEDURE:  This is a 51-year-old gentleman who has a history of multiple myeloma.  The PET scan identified a  left thyroid nodule.  Fine needle aspiration was consistent with papillary thyroid carcinoma.  Based on the fact that the patient has had previous myeloma and head and neck radiation, it was recommended the patient undergo total thyroidectomy, central neck dissection.  The surgical procedure was discussed with the patient including but not limited to the risks of bleeding, infection, injury to the recurrent laryngeal nerve or nerves, potential permanent hypocalcemia, loss of airway.  The patient is aware of this, agreed to proceed with surgery, a consent was obtained, and site was marked.    DESCRIPTION OF PROCEDURE:  The patient was brought to the operating room in stable condition, placed on the operating table in supine position.  After appropriate general anesthesia was obtained, the patient was prepped and draped in sterile fashion.  A timeout was then performed.  A 4 cm incision was made over the anterior neck following a natural skin crease.  The platysma was then divided and subplatysmal planes were then created.  The strap muscles were divided at the midline and retracted laterally.  Based on the biopsy results, we prepared to resect the left lobe of the thyroid gland first.  The superior pole of the left lobe of the thyroid gland was retracted inferolaterally.  The superior thyroidal vessels were  separately skeletonized, clipped and divided.  We then carried our dissection inferolaterally to identify the middle thyroid vein.  This was separately skeletonized, clipped and divided.  We rotated the gland from lateral to medial manner and identified the left superior parathyroid gland.  We then identified the left recurrent laryngeal nerve.  We followed this from a superior to inferior manner, dissecting the thyroid gland off it anteriorly.  The superior thyroidal vessels were separately skeletonized, clipped and divided.  We then identified the left inferior parathyroid gland, dissecting it from the undersurface of the thyroid.  The left inferior thyroidal vessels were separately skeletonized, clipped and divided.  We confirmed that the left recurrent laryngeal nerve was intact visibly and confirmed to be intact with nerve stimulation.  We then resected the right lobe of the thyroid gland in a similar manner.  The patient did not have any obvious lymphadenopathy.  A central neck was then performed.  We dissected the left level 6 and 7.  We dissected along the trachea medially, cephalad to the level of the inferior portion of the thyroid cartilage, laterally to the carotid sheath, inferiorly to the thoracic inlet and just deep to the recurrent laryngeal nerve.  It is quite likely we resected the left inferior parathyroid gland during this dissection.  The specimen was then sent for permanent pathologic evaluation.    The area was irrigated.  We confirmed that the right and left recurrent laryngeal nerves were intact visibly and confirmed to be intact with nerve stimulation.  Three parathyroid glands appeared to be intact and viable, with the left inferior parathyroid gland likely being resected in the left central neck.  Fibrillar was then placed in the operative bed.  Strap muscles were then approximated at the midline using a 3-0 chromic interrupted suture.  The platysma was approximated using a 3-0 chromic  interrupted suture.  The skin incision was approximated using 5-0 Monocryl running subcuticular suture.  The wound was dressed with Dermabond.  The patient was then extubated and transferred to the recovery room in stable condition.  I was present for the entire surgical procedure.    Clarita Sepulveda MD        D: 2022   T: 2022   MT: al    Name:     LÁZARO CEDILLO  MRN:      [id number]        Account:        [id number]   :      1970           Procedure Date: 2022     Document: [id number]

## 2022-05-12 ENCOUNTER — OFFICE VISIT (OUTPATIENT)
Dept: SURGERY | Facility: CLINIC | Age: 52
End: 2022-05-12
Payer: COMMERCIAL

## 2022-05-12 VITALS — DIASTOLIC BLOOD PRESSURE: 83 MMHG | OXYGEN SATURATION: 99 % | SYSTOLIC BLOOD PRESSURE: 145 MMHG | HEART RATE: 69 BPM

## 2022-05-12 DIAGNOSIS — E89.0 S/P TOTAL THYROIDECTOMY: Primary | ICD-10-CM

## 2022-05-12 LAB
DEPRECATED CALCIDIOL+CALCIFEROL SERPL-MC: <70 UG/L (ref 20–75)
PATH REPORT.COMMENTS IMP SPEC: NORMAL
PATH REPORT.COMMENTS IMP SPEC: NORMAL
PATH REPORT.FINAL DX SPEC: NORMAL
PATH REPORT.GROSS SPEC: NORMAL
PATH REPORT.MICROSCOPIC SPEC OTHER STN: NORMAL
PATH REPORT.MICROSCOPIC SPEC OTHER STN: NORMAL
PATH REPORT.RELEVANT HX SPEC: NORMAL
VITAMIN D2 SERPL-MCNC: <5 UG/L
VITAMIN D3 SERPL-MCNC: 65 UG/L

## 2022-05-12 PROCEDURE — 99024 POSTOP FOLLOW-UP VISIT: CPT | Performed by: SURGERY

## 2022-05-12 ASSESSMENT — PAIN SCALES - GENERAL: PAINLEVEL: NO PAIN (0)

## 2022-05-12 NOTE — PROGRESS NOTES
2 week post op visit s/p total thyroidectomy for thyroid nodule on PET scan    Since the surgery, patient denies any problems with voice quality, inspiration or swallowing. No sx of hyocalcemia    PE:  Wound is healing well. No evidence of hematoma, seroma or infection. Sutures and dermabond removed    Pathology reviewed with patient    Plan  Reviewed wound management and massage  Check TFT's at 6 weeks post op  Follow up as needed.    Clarita Sepulveda MD

## 2022-05-12 NOTE — NURSING NOTE
June Ronquillo's goals for this visit include:   Chief Complaint   Patient presents with     Surgical Followup     Pt reports he is feeling good        He requests these members of his care team be copied on today's visit information: no    PCP: Selma Johnson    Referring Provider:  Referred Self, MD  No address on file    BP (!) 145/83 (BP Location: Left arm, Patient Position: Chair, Cuff Size: Adult Regular)   Pulse 69   SpO2 99%     Do you need any medication refills at today's visit? No..Tanya Gamez RN

## 2022-05-12 NOTE — LETTER
5/12/2022         RE: June Ronquillo  [address]  [place] [address]        Dear Colleague,    Thank you for referring your patient, June Ronquillo, to the [facility]. Please see a copy of my visit note below.      2 week post op visit s/p total thyroidectomy for thyroid nodule on PET scan    Since the surgery, patient denies any problems with voice quality, inspiration or swallowing. No sx of hyocalcemia    PE:  Wound is healing well. No evidence of hematoma, seroma or infection. Sutures and dermabond removed    Pathology reviewed with patient    Plan  Reviewed wound management and massage  Check TFT's at 6 weeks post op  Follow up as needed.    Clarita Sepulveda MD          Again, thank you for allowing me to participate in the care of your patient.        Sincerely,        Clarita Sepulveda MD

## 2022-05-13 ENCOUNTER — VIRTUAL VISIT (OUTPATIENT)
Dept: TRANSPLANT | Facility: CLINIC | Age: 52
End: 2022-05-13
Attending: INTERNAL MEDICINE
Payer: COMMERCIAL

## 2022-05-13 DIAGNOSIS — Z94.81 STATUS POST BONE MARROW TRANSPLANT (H): Primary | ICD-10-CM

## 2022-05-13 LAB
ALBUMIN SERPL ELPH-MCNC: 4.1 G/DL (ref 3.7–5.1)
ALPHA1 GLOB SERPL ELPH-MCNC: 0.3 G/DL (ref 0.2–0.4)
ALPHA2 GLOB SERPL ELPH-MCNC: 0.6 G/DL (ref 0.5–0.9)
B-GLOBULIN SERPL ELPH-MCNC: 0.7 G/DL (ref 0.6–1)
CULTURE HARVEST COMPLETE DATE: NORMAL
GAMMA GLOB SERPL ELPH-MCNC: 0.3 G/DL (ref 0.7–1.6)
INTERPRETATION: NORMAL
M PROTEIN SERPL ELPH-MCNC: 0.1 G/DL
PROT PATTERN SERPL ELPH-IMP: ABNORMAL

## 2022-05-13 PROCEDURE — 99215 OFFICE O/P EST HI 40 MIN: CPT | Mod: 95 | Performed by: INTERNAL MEDICINE

## 2022-05-13 NOTE — PROGRESS NOTES
June is a 51 year old who is being evaluated via a billable video visit.      How would you like to obtain your AVS? MyChart  If the video visit is dropped, the invitation should be resent by: Text to cell phone: [phone]  Will anyone else be joining your video visit? Ashley GIMENEZ

## 2022-05-13 NOTE — PROGRESS NOTES
BMT Progress Note     ID: June Ronquillo is a 50 yo man s/p auto PBSCT for high risk MM, here for day +183 review.      HPI:   He is feeling overall well, energy is good, almost back to normal, back to work. Had thyroid surgery on Tuesday. Reports though rash/mole. Reports impotence despite sexual drive present. No infectious concerns, no F/C/, NS, no CP/SOB, no N/V/C/D. No bleeding, improved PN in left foot more than right- overall much better. Eating and drinking well.      ROS: 12 point Negative except as stated above in HPI       Diagnosis and Treatment Summary      Disease presentation and baseline characteristics: nontraumatic rib fracture     Date Treatment Name Response Side Effects / Toxicities    May to October 2021 11/11/21 RVD with daratumumab      Autologous hematopoietic cell transplantation, melphalans 200mg/m2, CD34 cell dose  6.99x10^6 VGPR        CR, biochemically    Day 100 PET/CT CITLALLI, BMB CITLALLI, and biochemical markers CR Peripheral neuropathy        neutropenic fevers, abdominal pain, nausea and diarrhea               BMT Work Up Results      Blood Counts          Recent Labs   Lab Test 11/10/21  0842 11/05/21  0840 11/04/21  0906 11/03/21  1000 10/20/21  1145 10/14/21  0943   HGB 12.0* 11.8* 12.0*   < > 13.0* 13.5   HCT 36.5* 36.5* 37.3*   < > 38.7* 38.1*   WBC 5.7 70.8* 69.5*   < > 6.7 6.5   ANEUTAUTO 3.3  --   --   --  3.3 3.8   ALYMPAUTO 1.4  --   --   --  2.1 1.6   AMONOAUTO 0.8  --   --   --  0.8 0.7   AEOSAUTO 0.2  --   --   --  0.4 0.4   ABSBASO 0.0  --   --   --  0.0 0.1   NRBCMAN 0.0  --   --   --  0.0 0.0    129* 321   < > 456* 489*    < > = values in this interval not displayed.       Bone Marrow 10/14/21     Bone marrow, posterior iliac crest, left decalcified trephine biopsy and touch imprint; left particle crush, direct aspirate smear, and concentrated aspirate smear; and peripheral smear:  -No morphologic or immunophenotypic evidence of plasma cell  neoplasm  -Normocellular marrow for age (variable ranging 40% to 60%, overall estimated at 50%) with orderly trilineage hematopoietic maturation, megakaryocytic hyperplasia, and 1% plasma cells  - Peripheral blood showing slight normochromic, normocytic anemia; slight thrombocytosis  - See comment     Flow: A. Iliac Crest, Left:  -Polytypic plasma cells  See comment            Blood Type     Recent Labs   Lab Test 10/13/21  1354   ABORH A POS          Chemistries       Recent Labs   Lab Test 11/10/21  0842 11/05/21  0840 11/04/21  0906    138 139   POTASSIUM 3.8 3.8 3.9   CHLORIDE 104 103 108   CO2 27 27 24   BUN 12 10 9   CR 0.89 0.93 0.84   * 144* 154*       Liver Tests       Recent Labs   Lab Test 11/10/21  0842 10/20/21  1145 10/14/21  0943   BILITOTAL 0.3 0.5 0.6   ALKPHOS 107 64 66   AST 10 18 15   ALT 19 17 15   ALBUMIN 3.6 4.2 4.1       PET/CT: 10/15/21     IMPRESSION:  Scattered non-FDG avid lytic predominant skeletal lesions consistent with multiple myeloma, similar to 08/31/2021. Findings suggest quiescent disease.      Chest X-Ray: Results for orders placed in visit on 11/03/21     XR CHEST 2 VW    Status: Normal 11/3/2021     Narrative  Exam: XR CHEST 2 VW, 11/3/2021 2:40 PM     Indication: check central line placement; Encounter for central line  placement     Comparison: Radiograph 10/18/2021     Findings:  PA and lateral views of the chest. Midline trachea. No pneumothorax or  pleural effusions. No airspace opacities. Normal cardiac silhouette  and mediastinum. No acute osseous abnormalities. Unremarkable upper  abdomen. Right IJ dialysis catheter with the tip in the mid SVC.     Impression  Impression: Right dialysis catheter with the tip in the mid SVC.  Otherwise normal chest radiograph.     I have personally reviewed the examination and initial interpretation  and I agree with the findings.     CASSANDRA RUSSO MD        SYSTEM ID:  [id number]         Chest CT No results found for  this or any previous visit.      PFTs FVC%      Recent Labs   Lab Test 10/13/21  1403   [id number] 64         FEV1%      Recent Labs   Lab Test 10/13/21  1403   [id number] 67         DLCO%      Recent Labs   Lab Test 10/13/21  1403   20143 70          ECHO or MUGA:    10/18/21  Interpretation Summary  Global and regional left ventricular function is normal with an EF of 60-65%.  Global peak LV longitudinal strain is averaged at -18.6%. This is within  reported normal limits (normal <-18%).  Right ventricular function, chamber size, wall motion, and thickness are  normal.  No significant valvular abnormalities were noted.  Previous study not available for comparison.      EKG       ECG results from 10/13/21   EKG 12-lead complete w/read - Clinics     Value     Systolic Blood Pressure       Diastolic Blood Pressure       Ventricular Rate 85     Atrial Rate 85     NJ Interval 140     QRS Duration 86          QTc 454     P Axis 64     R AXIS 16     T Axis 29     Interpretation ECG         Sinus rhythm  Nonspecific ST and T wave abnormality  Abnormal ECG  When compared with ECG of 17-MAY-2021 09:43,  Nonspecific T wave abnormality, improved in Inferior leads  T wave inversion no longer evident in Anterolateral leads  Confirmed by Nolan Kern ([id number]) on 10/14/2021 10:21:05 AM          Serologies: CMV: No lab results found.  EBV:       Recent Labs   Lab Test 10/13/21  1354   EBVCAG Positive*      HSV:       Recent Labs   Lab Test 10/13/21  1354   S1QORGV 10.60*   H1IGG Positive.  IgG antibody to HSV-1 detected.*   [id number] 0.05   H2IGG No HSV-2 IgG antibodies detected.      VZV: No lab results found.  HTLV:       Recent Labs   Lab Test 10/13/21  1354   DHTLVA Non-Reactive       Vitamin D     Recent Labs   Lab Test 01/25/21  1633   VITDT 29      [facility] IDMs     Recent Labs   Lab Test 10/13/21  1354   DCMIG Positive*   DHBSAG Non-Reactive   DHBCAB Non-Reactive   DHIVAB Non-Reactive   DHCVAB Non-Reactive   DHTLVA  Non-Reactive   TCRUZI Non-Reactive   DTRPAB Non-Reactive               Physical Exam  Video visit changed to telephone for technical problems     Labs:  Lab Results   Component Value Date    WBC 5.2 05/09/2022    ANEU 7.4 11/29/2021    HGB 12.0 (L) 05/09/2022    HCT 35.9 (L) 05/09/2022     05/09/2022     05/09/2022    POTASSIUM 4.0 05/09/2022    CHLORIDE 106 05/09/2022    CO2 26 05/09/2022     (H) 05/09/2022    BUN 13 05/09/2022    CR 0.99 05/09/2022    MAG 1.8 05/09/2022    INR 0.89 05/09/2022    BILITOTAL 0.4 05/09/2022    AST 26 05/09/2022    ALT 27 05/09/2022    ALKPHOS 54 05/09/2022    PROTTOTAL 6.5 (L) 05/09/2022    ALBUMIN 3.8 05/09/2022       I have assessed all abnormal lab values for their clinical significance and any values considered clinically significant have been addressed in the assessment and plan.      A/P: June Ronquillo is a 52 yo man D+103 s/p auto PBSCT for high risk MM      1. BMT  - Cell dose 6.99 x 10^6; high risk disease  - GCSF completed 11/23.   - 12/7/2021: Light chains low, SPEP/IFN no monoclonal proteins.  - 1/19/2022 Day 60 biochemical assessment: FLC K 0.14, FLCL 0.3, K/L 0.47, IgG 204, IgG 7, IgM <10. SPEP/IFN: Marked hypogammaglobulinemia. No monoclonal protein seen on electrophoresis, immunofixation.  - Follows with Dr. Vazquez for maintenance (on lenalidomide + vidya off study due to high risk disease and neuropathy, other consideration are for PI  Or single agent IMids), Zometa and other routine care.  - 2/19/2022 PET/CT: No FDG avid bone lesions. Numerous lytic lesions predominantly in the axial skeleton are stable. FDG avid left thyroid nodule, most likely representing a papillary thyroid carcinoma. Consider further evaluation with thyroid  Ultrasound. 3/2022 BMB No morphologic or immunophenotypic evidence for plasma cell myeloma, less than 1% overall polytypic plasma cells. CG WNL No clonal chromosomal abnormality was detected among the 20 metaphase  cells analyzed.  No evidence was found by G-banding of the complex abnormal clone identified at diagnosis of this patient's myeloma. FLC WNL. SPEP/IFN Faint monoclonal IgG immunoglobulin of kappa light chain type on IFN.  -  ASSESSMENT:  --- 5/9/2022 BMB: No morphologic or immunophenotypic evidence of plasma cell neoplasm. Normocellular marrow for age (variable, overall 40%) with trilineage hematopoiesis, no overt dysplasia, no increase in blasts, and less than 1% polytypic plasma cells  -- SPEP PENDING (0.1 IN APRIL), IFN Monoclonal IgG immunoglobulin of kappa light chain type. UPEP/IFN No obvious monoclonal protein seen and the absence of a monoclonal immunoglobulins was confirmed by immunofixation of this same urine sample. TP/cR WNL. IgG/A/M low, CD4 WNL, FLC NA added to labs.     2. HEME/COAG:  Mild anemia, transfusion independent      3. ID.    Bactrim started 12/13/2021, on ACV and continue both for one year  Fluconazole until day +60, completed  Received euvsheld, influenza vaccine and covid boosters after day 100, will get locally not startig today due to commitments and concern with SE. Per updateds from Dr. Nair he has recevied the vaccines and is agreeable to participating in the vaccine study, recommended expert opinion to wait 4 months from [facility] to start boosters.    4. GI/NUTRITION: Improving, no concerns      5. CV: aspirin, statin, over-the-counter fish oil, losartan. Return to his cardiologist for follow-up.  - Hx of CAD, MI at age 28 with stent placed  - HTN: 11/26 resumed losartan 50mg daily. Cont to hold hydrochlorothiazide      6. RENAL/FEN:  Cr and lytes stable    7. ENDOCRINE: 5/2022 s/p thyroidectomy, following with ENT and endocrine  - hx hypothyroidism, cont synthroid  - new diagnosis of papillary thyroid carcinoma per bx result from 11/2  - Zometa with oncology, ca/vitD  - Impotence work up by endocrine starting with hormonal evaluation and referral to urology as needed, he  will contact them     8. Neuro: PN better on B complex, off gabapentin    9. Dermatology referral placed today    RTC Dr Du Newsome 1 year; Call come sooner if needed  Continue Acyclovir 800 mg twice daily and bactrim DS 1 tab twice daily MON and TUE until 11/11/22  Referred back to D for maintainence and other care     40 minutes spent on the date of the encounter doing chart review, history and exam, documentation and further activities per the note      Cassie Newsome MD

## 2022-05-13 NOTE — LETTER
5/13/2022         RE: June Ronquillo  [address]  [facility] [address]        Dear Colleague,    Thank you for referring your patient, June Ronquillo, to the [facility] [place]. Please see a copy of my visit note below.    BMT Progress Note     ID: June Ronquillo is a 52 yo man s/p auto PBSCT for high risk MM, here for day +183 review.      HPI:   He is feeling overall well, energy is good, almost back to normal, back to work. Had thyroid surgery on Tuesday. Reports though rash/mole. Reports impotence despite sexual drive present. No infectious concerns, no F/C/, NS, no CP/SOB, no N/V/C/D. No bleeding, improved PN in left foot more than right- overall much better. Eating and drinking well.      ROS: 12 point Negative except as stated above in HPI       Diagnosis and Treatment Summary      Disease presentation and baseline characteristics: nontraumatic rib fracture     Date Treatment Name Response Side Effects / Toxicities    May to October 2021 11/11/21 RVD with daratumumab      Autologous hematopoietic cell transplantation, melphalans 200mg/m2, CD34 cell dose  6.99x10^6 VGPR        CR, biochemically    Day 100 PET/CT CITLALLI, BMB CITLALLI, and biochemical markers CR Peripheral neuropathy        neutropenic fevers, abdominal pain, nausea and diarrhea               BMT Work Up Results      Blood Counts          Recent Labs   Lab Test 11/10/21  0842 11/05/21  0840 11/04/21  0906 11/03/21  1000 10/20/21  1145 10/14/21  0943   HGB 12.0* 11.8* 12.0*   < > 13.0* 13.5   HCT 36.5* 36.5* 37.3*   < > 38.7* 38.1*   WBC 5.7 70.8* 69.5*   < > 6.7 6.5   ANEUTAUTO 3.3  --   --   --  3.3 3.8   ALYMPAUTO 1.4  --   --   --  2.1 1.6   AMONOAUTO 0.8  --   --   --  0.8 0.7   AEOSAUTO 0.2  --   --   --  0.4 0.4   ABSBASO 0.0  --   --   --  0.0 0.1   NRBCMAN 0.0  --   --   --  0.0 0.0    129* 321   < > 456* 489*    < > = values in this interval not displayed.        Bone Marrow 10/14/21     Bone marrow, posterior iliac crest, left decalcified trephine biopsy and touch imprint; left particle crush, direct aspirate smear, and concentrated aspirate smear; and peripheral smear:  -No morphologic or immunophenotypic evidence of plasma cell neoplasm  -Normocellular marrow for age (variable ranging 40% to 60%, overall estimated at 50%) with orderly trilineage hematopoietic maturation, megakaryocytic hyperplasia, and 1% plasma cells  - Peripheral blood showing slight normochromic, normocytic anemia; slight thrombocytosis  - See comment     Flow: A. Iliac Crest, Left:  -Polytypic plasma cells  See comment            Blood Type     Recent Labs   Lab Test 10/13/21  1354   ABORH A POS          Chemistries       Recent Labs   Lab Test 11/10/21  0842 11/05/21  0840 11/04/21  0906    138 139   POTASSIUM 3.8 3.8 3.9   CHLORIDE 104 103 108   CO2 27 27 24   BUN 12 10 9   CR 0.89 0.93 0.84   * 144* 154*       Liver Tests       Recent Labs   Lab Test 11/10/21  0842 10/20/21  1145 10/14/21  0943   BILITOTAL 0.3 0.5 0.6   ALKPHOS 107 64 66   AST 10 18 15   ALT 19 17 15   ALBUMIN 3.6 4.2 4.1       PET/CT: 10/15/21     IMPRESSION:  Scattered non-FDG avid lytic predominant skeletal lesions consistent with multiple myeloma, similar to 08/31/2021. Findings suggest quiescent disease.      Chest X-Ray: Results for orders placed in visit on 11/03/21     XR CHEST 2 VW    Status: Normal 11/3/2021     Narrative  Exam: XR CHEST 2 VW, 11/3/2021 2:40 PM     Indication: check central line placement; Encounter for central line  placement     Comparison: Radiograph 10/18/2021     Findings:  PA and lateral views of the chest. Midline trachea. No pneumothorax or  pleural effusions. No airspace opacities. Normal cardiac silhouette  and mediastinum. No acute osseous abnormalities. Unremarkable upper  abdomen. Right IJ dialysis catheter with the tip in the mid SVC.     Impression  Impression: Right  dialysis catheter with the tip in the mid SVC.  Otherwise normal chest radiograph.     I have personally reviewed the examination and initial interpretation  and I agree with the findings.     CASSANDRA RUSSO MD        SYSTEM ID:  [id number]         Chest CT No results found for this or any previous visit.      PFTs FVC%      Recent Labs   Lab Test 10/13/21  1403   20003 64         FEV1%      Recent Labs   Lab Test 10/13/21  1403   20016 67         DLCO%      Recent Labs   Lab Test 10/13/21  1403   20143 70          ECHO or MUGA:    10/18/21  Interpretation Summary  Global and regional left ventricular function is normal with an EF of 60-65%.  Global peak LV longitudinal strain is averaged at -18.6%. This is within  reported normal limits (normal <-18%).  Right ventricular function, chamber size, wall motion, and thickness are  normal.  No significant valvular abnormalities were noted.  Previous study not available for comparison.      EKG       ECG results from 10/13/21   EKG 12-lead complete w/read - Clinics     Value     Systolic Blood Pressure       Diastolic Blood Pressure       Ventricular Rate 85     Atrial Rate 85     HI Interval 140     QRS Duration 86          QTc 454     P Axis 64     R AXIS 16     T Axis 29     Interpretation ECG         Sinus rhythm  Nonspecific ST and T wave abnormality  Abnormal ECG  When compared with ECG of 17-MAY-2021 09:43,  Nonspecific T wave abnormality, improved in Inferior leads  T wave inversion no longer evident in Anterolateral leads  Confirmed by Nolan Kern ([id number]) on 10/14/2021 10:21:05 AM          Serologies: CMV: No lab results found.  EBV:       Recent Labs   Lab Test 10/13/21  1354   EBVCAG Positive*      HSV:       Recent Labs   Lab Test 10/13/21  1354   T2EDATS 10.60*   H1IGG Positive.  IgG antibody to HSV-1 detected.*   [id number] 0.05   H2IGG No HSV-2 IgG antibodies detected.      VZV: No lab results found.  HTLV:       Recent Labs   Lab Test  10/13/21  1354   DHTLVA Non-Reactive       Vitamin D     Recent Labs   Lab Test 01/25/21  1633   VITDT 29      [facility] IDMs     Recent Labs   Lab Test 10/13/21  1354   DCMIG Positive*   DHBSAG Non-Reactive   DHBCAB Non-Reactive   DHIVAB Non-Reactive   DHCVAB Non-Reactive   DHTLVA Non-Reactive   TCRUZI Non-Reactive   DTRPAB Non-Reactive               Physical Exam  Video visit changed to telephone for technical problems     Labs:  Lab Results   Component Value Date    WBC 5.2 05/09/2022    ANEU 7.4 11/29/2021    HGB 12.0 (L) 05/09/2022    HCT 35.9 (L) 05/09/2022     05/09/2022     05/09/2022    POTASSIUM 4.0 05/09/2022    CHLORIDE 106 05/09/2022    CO2 26 05/09/2022     (H) 05/09/2022    BUN 13 05/09/2022    CR 0.99 05/09/2022    MAG 1.8 05/09/2022    INR 0.89 05/09/2022    BILITOTAL 0.4 05/09/2022    AST 26 05/09/2022    ALT 27 05/09/2022    ALKPHOS 54 05/09/2022    PROTTOTAL 6.5 (L) 05/09/2022    ALBUMIN 3.8 05/09/2022       I have assessed all abnormal lab values for their clinical significance and any values considered clinically significant have been addressed in the assessment and plan.      A/P: June Ronquillo is a 52 yo man D+103 s/p auto PBSCT for high risk MM      1. BMT  - Cell dose 6.99 x 10^6; high risk disease  - GCSF completed 11/23.   - 12/7/2021: Light chains low, SPEP/IFN no monoclonal proteins.  - 1/19/2022 Day 60 biochemical assessment: FLC K 0.14, FLCL 0.3, K/L 0.47, IgG 204, IgG 7, IgM <10. SPEP/IFN: Marked hypogammaglobulinemia. No monoclonal protein seen on electrophoresis, immunofixation.  - Follows with Dr. Vazquez for maintenance (on lenalidomide + vidya off study due to high risk disease and neuropathy, other consideration are for PI  Or single agent IMids), Zometa and other routine care.  - 2/19/2022 PET/CT: No FDG avid bone lesions. Numerous lytic lesions predominantly in the axial skeleton are stable. FDG avid left thyroid nodule, most likely  representing a papillary thyroid carcinoma. Consider further evaluation with thyroid  Ultrasound. 3/2022 BMB No morphologic or immunophenotypic evidence for plasma cell myeloma, less than 1% overall polytypic plasma cells. CG WNL No clonal chromosomal abnormality was detected among the 20 metaphase cells analyzed.  No evidence was found by G-banding of the complex abnormal clone identified at diagnosis of this patient's myeloma. FLC WNL. SPEP/IFN Faint monoclonal IgG immunoglobulin of kappa light chain type on IFN.  -  ASSESSMENT:  --- 5/9/2022 BMB: No morphologic or immunophenotypic evidence of plasma cell neoplasm. Normocellular marrow for age (variable, overall 40%) with trilineage hematopoiesis, no overt dysplasia, no increase in blasts, and less than 1% polytypic plasma cells  -- SPEP PENDING (0.1 IN APRIL), IFN Monoclonal IgG immunoglobulin of kappa light chain type. UPEP/IFN No obvious monoclonal protein seen and the absence of a monoclonal immunoglobulins was confirmed by immunofixation of this same urine sample. TP/cR WNL. IgG/A/M low, CD4 WNL, FLC NA added to labs.     2. HEME/COAG:  Mild anemia, transfusion independent      3. ID.    Bactrim started 12/13/2021, on ACV and continue both for one year  Fluconazole until day +60, completed  Received euvsheld, influenza vaccine and covid boosters after day 100, will get locally not startig today due to commitments and concern with SE. Per updateds from Dr. Nair he has recevied the vaccines and is agreeable to participating in the vaccine study, recommended expert opinion to wait 4 months from [facility] license of [facility] to start boosters.    4. GI/NUTRITION: Improving, no concerns      5. CV: aspirin, statin, over-the-counter fish oil, losartan. Return to his cardiologist for follow-up.  - Hx of CAD, MI at age 28 with stent placed  - HTN: 11/26 resumed losartan 50mg daily. Cont to hold hydrochlorothiazide      6. RENAL/FEN:  Cr and lytes stable    7. ENDOCRINE: 5/2022  s/p thyroidectomy, following with ENT and endocrine  - hx hypothyroidism, cont synthroid  - new diagnosis of papillary thyroid carcinoma per bx result from 11/2  - Zometa with oncology, ca/vitD  - Impotence work up by endocrine starting with hormonal evaluation and referral to urology as needed, he will contact them     8. Neuro: PN better on B complex, off gabapentin    9. Dermatology referral placed today    RTC Dr Du Newsome 1 year; Call come sooner if needed  Continue Acyclovir 800 mg twice daily and bactrim DS 1 tab twice daily MON and TUE until 11/11/22  Referred back to RMD for maintainence and other care     40 minutes spent on the date of the encounter doing chart review, history and exam, documentation and further activities per the note      Cassie Newsome MD

## 2022-05-15 ENCOUNTER — DOCUMENTATION ONLY (OUTPATIENT)
Dept: ONCOLOGY | Facility: CLINIC | Age: 52
End: 2022-05-15
Payer: COMMERCIAL

## 2022-05-16 ENCOUNTER — APPOINTMENT (OUTPATIENT)
Dept: LAB | Facility: CLINIC | Age: 52
End: 2022-05-16
Attending: INTERNAL MEDICINE
Payer: COMMERCIAL

## 2022-05-16 ENCOUNTER — ALLIED HEALTH/NURSE VISIT (OUTPATIENT)
Dept: TRANSPLANT | Facility: CLINIC | Age: 52
End: 2022-05-16
Attending: INTERNAL MEDICINE
Payer: COMMERCIAL

## 2022-05-16 VITALS
BODY MASS INDEX: 26.71 KG/M2 | SYSTOLIC BLOOD PRESSURE: 128 MMHG | OXYGEN SATURATION: 98 % | WEIGHT: 163 LBS | DIASTOLIC BLOOD PRESSURE: 75 MMHG | TEMPERATURE: 97.5 F | RESPIRATION RATE: 16 BRPM | HEART RATE: 88 BPM

## 2022-05-16 DIAGNOSIS — C90.00 MULTIPLE MYELOMA NOT HAVING ACHIEVED REMISSION (H): ICD-10-CM

## 2022-05-16 DIAGNOSIS — Z94.84 HISTORY OF PERIPHERAL STEM CELL TRANSPLANT (H): ICD-10-CM

## 2022-05-16 DIAGNOSIS — Z00.6 RESEARCH STUDY PATIENT: ICD-10-CM

## 2022-05-16 LAB
BASOPHILS # BLD AUTO: 0.1 10E3/UL (ref 0–0.2)
BASOPHILS NFR BLD AUTO: 1 %
EOSINOPHIL # BLD AUTO: 0.6 10E3/UL (ref 0–0.7)
EOSINOPHIL NFR BLD AUTO: 10 %
ERYTHROCYTE [DISTWIDTH] IN BLOOD BY AUTOMATED COUNT: 15.9 % (ref 10–15)
HCT VFR BLD AUTO: 35.3 % (ref 40–53)
HGB BLD-MCNC: 11.6 G/DL (ref 13.3–17.7)
IMM GRANULOCYTES # BLD: 0 10E3/UL
IMM GRANULOCYTES NFR BLD: 0 %
KAPPA LC FREE SER-MCNC: 0.75 MG/DL (ref 0.33–1.94)
KAPPA LC FREE/LAMBDA FREE SER NEPH: 1.88 {RATIO} (ref 0.26–1.65)
LAMBDA LC FREE SERPL-MCNC: 0.4 MG/DL (ref 0.57–2.63)
LYMPHOCYTES # BLD AUTO: 1.8 10E3/UL (ref 0.8–5.3)
LYMPHOCYTES NFR BLD AUTO: 30 %
MCH RBC QN AUTO: 29.8 PG (ref 26.5–33)
MCHC RBC AUTO-ENTMCNC: 32.9 G/DL (ref 31.5–36.5)
MCV RBC AUTO: 91 FL (ref 78–100)
MONOCYTES # BLD AUTO: 1.1 10E3/UL (ref 0–1.3)
MONOCYTES NFR BLD AUTO: 18 %
NEUTROPHILS # BLD AUTO: 2.4 10E3/UL (ref 1.6–8.3)
NEUTROPHILS NFR BLD AUTO: 41 %
NRBC # BLD AUTO: 0 10E3/UL
NRBC BLD AUTO-RTO: 0 /100
PLATELET # BLD AUTO: 373 10E3/UL (ref 150–450)
RBC # BLD AUTO: 3.89 10E6/UL (ref 4.4–5.9)
WBC # BLD AUTO: 6 10E3/UL (ref 4–11)

## 2022-05-16 PROCEDURE — 0051A HC ADMIN COVID VAC PFIZER TRS-SUCR, 1ST DOSE: CPT | Performed by: INTERNAL MEDICINE

## 2022-05-16 PROCEDURE — 250N000011 HC RX IP 250 OP 636: Performed by: INTERNAL MEDICINE

## 2022-05-16 PROCEDURE — G0463 HOSPITAL OUTPT CLINIC VISIT: HCPCS | Mod: 25

## 2022-05-16 PROCEDURE — 85025 COMPLETE CBC W/AUTO DIFF WBC: CPT

## 2022-05-16 PROCEDURE — 91305 HC RX IP 250 OP 636: CPT | Performed by: INTERNAL MEDICINE

## 2022-05-16 PROCEDURE — 36415 COLL VENOUS BLD VENIPUNCTURE: CPT

## 2022-05-16 RX ADMIN — BNT162B2 30 MCG: 0.23 INJECTION, SUSPENSION INTRAMUSCULAR at 17:32

## 2022-05-16 ASSESSMENT — PAIN SCALES - GENERAL: PAINLEVEL: NO PAIN (0)

## 2022-05-16 NOTE — NURSING NOTE
Gave first dose of Covid 19 Vaccination of Pfizer. Patient filled out consent form and had no questions or concerns. After administration patient was advised to wait 15min to be observed for any adverse reactions. Patients name and date of birth was verified please see MAR for more details.     Elenita Gatica LPN on 5/16/2022 at 5:52 PM

## 2022-05-16 NOTE — NURSING NOTE
Chief Complaint   Patient presents with     Blood Draw     Labs drawn via  by RN in lab. VS taken     Michael Marin RN

## 2022-05-17 NOTE — PROGRESS NOTES
[facility]   Transplant Infectious Disease Research Note   Patient:  June Ronquillo, Date of birth 1970, Medical record number [id number]    A Prospective Observational Study of The Immunogenicity of Vaccines for Severe Acute Respiratory Syndrome Coronavirus 2 (Sars-Cov-2) After Autologous Hematopoietic Stem Cell Transplantation (HCT), Allogeneic HCT, and Chimeric Antigen Receptor T-Cell Therapy       Sponsor:    [employer]   Protocol Number:  BMT CTN 2101, [facility] SC21-07   : Mariah Bernabe MD       Phone: [phone]     [employer] # [id number], IRB number [id number]  June Ronquillo was approached on 5/16/2022 for this research studying immune response to SARS-CoV-2 vaccination. For this research, participants must be planning to receive a SARS-CoV-2 vaccination within 14 days. Bloodwork is drawn to assess immune response at 5 timepoints. During the consent process, the risks, benefits, and alternatives of this study were discussed.  The alternative is not to participate in the study. June Ronquillo is planning to receive a vaccine later today. Hhe was given time to ask questions and did volunteer to participate in this clinical study. June Ronquillo has signed consent, and has a copy of the signed consent form. No study procedures were performed prior to the consent document having been signed.     Attestation:   I have reviewed the inclusion and exclusion criteria, and June Ronquillo is eligible. I conducted the consent discussion.    MARIAH BERNABE MD    Pager [phone]      Absolute CD4, [place] T Cells   Date Value Ref Range Status   05/09/2022 473 441 - 2,156 cells/uL Final   03/07/2022 565 441-2,156 cells/uL Final       Coronavirus-19 testing    Recent Labs   Lab Test 05/09/22  0928 03/07/22  0834 03/07/22  0834 06/25/21  1650   CD19 5*  --  6  --    ACD19 84*   < > 134  --    [id number]  --   --   --   Nasopharyngeal   [id number]  --   --   --  Nasopharyngeal    < > = values in this interval not displayed.       Immunization History   Administered Date(s) Administered     COVID-19,PF,Moderna 03/20/2021, 04/17/2021, 09/03/2021     COVID-19,PF,Pfizer 12+ Yrs (2022 and After) 05/16/2022     FLU 6-35 months 10/18/2012     HEPA 06/01/2005, 09/30/2008     Hep B, Peds or Adolescent 10/29/2015, 11/09/2016     HepA-Adult 09/30/2008     HepB 10/29/2015, 11/09/2016     HepB-Adult 04/17/2019     Influenza (IIV3) PF 11/19/2005, 11/13/2006, 11/13/2007, 09/30/2008, 09/04/2009, 10/22/2010, 10/10/2011, 10/18/2012     Influenza Intranasal Vaccine 09/04/2009     Influenza Quad, Recombinant, pf(RIV4) (Flublok) 01/19/2022     Influenza Vaccine IM > 6 months Valent IIV4 (Alfuria,Fluzone) 10/29/2015, 11/09/2016, 01/04/2018, 09/17/2018, 10/30/2019, 09/16/2020     MMR Not Indicated - By Titer 04/17/2019     Poliovirus, inactivated (IPV) 07/09/2013     TD (ADULT, 7+) 11/17/2004     TDAP Vaccine (Adacel) 01/19/2011, 04/17/2019     Tdap (Adacel,Boostrix) 01/19/2011     Typhoid Oral 06/01/2005, 10/22/2010, 10/29/2015

## 2022-05-18 DIAGNOSIS — G62.9 PERIPHERAL POLYNEUROPATHY: ICD-10-CM

## 2022-05-18 DIAGNOSIS — Z94.81 STATUS POST BONE MARROW TRANSPLANT (H): ICD-10-CM

## 2022-05-18 RX ORDER — LOSARTAN POTASSIUM 50 MG/1
50 TABLET ORAL DAILY
Qty: 90 TABLET | Refills: 0 | Status: SHIPPED | OUTPATIENT
Start: 2022-05-18 | End: 2022-08-16

## 2022-05-18 NOTE — PROGRESS NOTES
Oncologic Hx:   - 4/30/2021 M spike of 34.8 in urine.M spike in blood 0.2; IgG 702 with depressed IgA and IgM. Beta 2 microglobulin 2.7. Lambda  with K/L ratio of 0.01. WBC 11.1 with absolute eos of 1.0. hgb, plt, Cr (1.1), and albumin WNL.    -4/30/2021 CT abd/pelvis showed sclerotic marrow changes with numerous lytic appearing lesions throughout the imaged portions of the spine, pelvis and ribs.      -PET scan 5/11/2021 Numerous osteolytic lesions which predominantly demonstrate uptake below liver background levels. There is one intraosseous lesion in the left lateral 9th rib that demonstrates uptake above background, possibly due to nondisplaced fracture. Adjacent to this lesion is mild  hypermetabolism to the left pleura, with new small left pleural effusion, suspicious for malignant effusion versus posttraumatic effusion. Pleural uptake may represent extramedullary myeloma extending along the intercostal space versus inflammation.    - BM bx 5/17/2021 with flow showing 4.0% plasma cells which express CD19 (dim), CD38 (bright), CD45 (dim), , and monotypic cytoplasmic lambda immunoglobulin light chains but lack CD20 and CD56.  Hypercellular bone marrow for age (approximately 75% cellularity) with adequate trilineage hematopoiesis. Plasma cell infiltrate: Interstitial, lambda monotypic and representing approximately 60% the bone marrow cellularity, by  immunohistochemical stain. Cytpgenetics with 2 related hypodiploid clones. One with loss of Y, monosomy 13 and 14 (5%) and one with translocation of 8p and 14, derivative 16 with long arm replaced by extra copy 1q,monosomy 21. FISH showed gain of 1q, loss of 13 and 14, IGH-MYC fusion t(8:14)    - Started Miracle-RVd 21 day with velcade on days 1,8,15.     -Cycle 2 Miracle-RVd. Dose reduce dex to 80 mg d/t fatigue and stomach upset on dex days. Also having cough with basilar streaking on CXR    - 8/31/2021 BM bx -rare suspicious plasma cells in touch  "imprint. No increase in plasma cells by morph.    -8/31/2021 PET/CT Diffuse osteolytic lesions throughout the axial and appendicular skeleton. Increased sclerosis since prior exam 5/11/2021 without increased metabolism or size.  Hypermetabolic pretracheal lymph node as well as hypermetabolic level 2 lymph nodes within the right neck. These lymph nodes are likely reactive from autoimmune activation via medical therapy. Hypermetabolic subcentimeter nodules within the thyroid gland    - 11/11/2021 Autologous BMT    - 2/21/22 started maintenance with Revlimid 10 mg daily and daratumumab monthly; did Revlimid alone for C1 due to low IgG levels, added daratumumab starting C2    Interval Hx:   He feels well overall.  He had a thyroidectomy about 2 weeks ago and is recovering well. He is tolerating therapy well without any major side effects. Neuropathy is very mild; he feels it is triggered by certain foods, so he avoids those in his diet. He reports having a rash on his lateral left thigh that has recently been assessed by Deborah and Dr. Du Wheeler.  He notes that a dermatology referral was placed for further evaluation.    Patient denies fevers, chills, night sweats, unexplained weight changes, headaches, dizziness, vision or hearing changes, new lumps or bumps, chest pain, shortness of breath, cough, abdominal pain, nausea, vomiting, changes to bowel or bladder, swelling of extremities, bleeding issues, or rash.      A comprehensive review of systems was completed and negative except as described above.       Physical Exam:  /78   Pulse 98   Temp 98.2  F (36.8  C)   Resp 18   Ht 1.651 m (5' 5\")   Wt 73.8 kg (162 lb 11.2 oz)   SpO2 99%   BMI 27.07 kg/m    General: No acute distress  HEENT: Sclera anicteric. Oral mucosa pink and moist.  No mucositis or thrush  Lymph: No lymphadenopathy in neck  Heart: Regular, rate, and rhythm  Lungs: Clear to ascultation bilaterally  Abdomen: Positive bowel sounds. Soft, " non-distended, non-tender. No organomegaly or mass.   Extremities: no lower extremity edema  Neuro: Cranial nerves grossly intact  Rash: reports rash on left thigh, but declined assessment today as it has recently been assessed by two other providers and he has a derm referral pending      Labs:  I personally reviewed the following labs:    Most Recent 3 CBC's:  Recent Labs   Lab Test 05/19/22  1352 05/16/22  1717 05/09/22  0928   WBC 5.8 6.0 5.2   HGB 11.6* 11.6* 12.0*   MCV 89 91 90    373 321     Most Recent 3 BMP's:  Recent Labs   Lab Test 05/19/22  1352 05/09/22  0928 04/22/22  0643 03/24/22  1503    140 140 139   POTASSIUM 3.8 4.0 4.3 3.8   CHLORIDE 110* 106 109 109   CO2  --  26 25 23   BUN  --  13 16 12   CR  --  0.99 1.02 1.06   ANIONGAP  --  8 6 7   HARDEEP  --  9.3 8.8 8.6   GLC  --  106* 117* 99     Most Recent 2 LFT's:  Recent Labs   Lab Test 05/09/22  0928 04/22/22  0643   AST 26 29   ALT 27 29   ALKPHOS 54 54   BILITOTAL 0.4 0.3       Assessment and Plan  Logan has multiple myeloma with high risk cytogenetics. He got Miracle-RVD with VGPR, then underwent autologous transplant 11/11/21. Continue acyclovir daily for viral ppx and bactrim M/T for PJP ppx. Given his high risk cytogenetics he started miracle + revlimid maintenance therapy 2/21/22. Follow IgG and replete if it remains <300. He had severe flu-like effects from his first dose of Zometa but he has been tolerating monthly Zometa dosed at 3 mg. Continue Vit D. Continue B vitamin supplement to help his neuropathy       38 minutes spent on the date of the encounter doing chart review, review of test results, patient visit and documentation     PATTY Solis CNP  [facility] [place] [place], [place] [address] [phone]

## 2022-05-19 ENCOUNTER — ONCOLOGY VISIT (OUTPATIENT)
Dept: ONCOLOGY | Facility: CLINIC | Age: 52
End: 2022-05-19
Attending: STUDENT IN AN ORGANIZED HEALTH CARE EDUCATION/TRAINING PROGRAM
Payer: COMMERCIAL

## 2022-05-19 ENCOUNTER — APPOINTMENT (OUTPATIENT)
Dept: LAB | Facility: CLINIC | Age: 52
End: 2022-05-19
Attending: STUDENT IN AN ORGANIZED HEALTH CARE EDUCATION/TRAINING PROGRAM
Payer: COMMERCIAL

## 2022-05-19 VITALS
DIASTOLIC BLOOD PRESSURE: 78 MMHG | TEMPERATURE: 98.2 F | HEIGHT: 65 IN | RESPIRATION RATE: 18 BRPM | SYSTOLIC BLOOD PRESSURE: 135 MMHG | HEART RATE: 98 BPM | WEIGHT: 162.7 LBS | BODY MASS INDEX: 27.11 KG/M2 | OXYGEN SATURATION: 99 %

## 2022-05-19 DIAGNOSIS — Z79.899 ENCOUNTER FOR LONG-TERM (CURRENT) USE OF MEDICATIONS: ICD-10-CM

## 2022-05-19 DIAGNOSIS — C90.00 MULTIPLE MYELOMA NOT HAVING ACHIEVED REMISSION (H): Primary | ICD-10-CM

## 2022-05-19 LAB
ALBUMIN SERPL-MCNC: 3.8 G/DL (ref 3.4–5)
ALP SERPL-CCNC: 56 U/L (ref 40–150)
ALT SERPL W P-5'-P-CCNC: 27 U/L (ref 0–70)
ANION GAP SERPL CALCULATED.3IONS-SCNC: 7 MMOL/L (ref 3–14)
AST SERPL W P-5'-P-CCNC: 25 U/L (ref 0–45)
BASOPHILS # BLD AUTO: 0 10E3/UL (ref 0–0.2)
BASOPHILS NFR BLD AUTO: 1 %
BILIRUB SERPL-MCNC: 0.4 MG/DL (ref 0.2–1.3)
BUN SERPL-MCNC: 16 MG/DL (ref 7–30)
CALCIUM SERPL-MCNC: 8.8 MG/DL (ref 8.5–10.1)
CHLORIDE BLD-SCNC: 110 MMOL/L (ref 94–109)
CO2 SERPL-SCNC: 23 MMOL/L (ref 20–32)
CREAT SERPL-MCNC: 1.07 MG/DL (ref 0.66–1.25)
EOSINOPHIL # BLD AUTO: 0.5 10E3/UL (ref 0–0.7)
EOSINOPHIL NFR BLD AUTO: 9 %
ERYTHROCYTE [DISTWIDTH] IN BLOOD BY AUTOMATED COUNT: 16.3 % (ref 10–15)
GFR SERPL CREATININE-BSD FRML MDRD: 83 ML/MIN/1.73M2
GLUCOSE BLD-MCNC: 146 MG/DL (ref 70–99)
HCT VFR BLD AUTO: 33.9 % (ref 40–53)
HGB BLD-MCNC: 11.6 G/DL (ref 13.3–17.7)
IMM GRANULOCYTES # BLD: 0 10E3/UL
IMM GRANULOCYTES NFR BLD: 0 %
LYMPHOCYTES # BLD AUTO: 1.9 10E3/UL (ref 0.8–5.3)
LYMPHOCYTES NFR BLD AUTO: 33 %
MCH RBC QN AUTO: 30.6 PG (ref 26.5–33)
MCHC RBC AUTO-ENTMCNC: 34.2 G/DL (ref 31.5–36.5)
MCV RBC AUTO: 89 FL (ref 78–100)
MONOCYTES # BLD AUTO: 0.8 10E3/UL (ref 0–1.3)
MONOCYTES NFR BLD AUTO: 14 %
NEUTROPHILS # BLD AUTO: 2.5 10E3/UL (ref 1.6–8.3)
NEUTROPHILS NFR BLD AUTO: 43 %
NRBC # BLD AUTO: 0 10E3/UL
NRBC BLD AUTO-RTO: 0 /100
PLATELET # BLD AUTO: 376 10E3/UL (ref 150–450)
POTASSIUM BLD-SCNC: 3.8 MMOL/L (ref 3.4–5.3)
PROT SERPL-MCNC: 6.7 G/DL (ref 6.8–8.8)
RBC # BLD AUTO: 3.79 10E6/UL (ref 4.4–5.9)
SODIUM SERPL-SCNC: 140 MMOL/L (ref 133–144)
TOTAL PROTEIN SERUM FOR ELP: 6.3 G/DL (ref 6.8–8.8)
WBC # BLD AUTO: 5.8 10E3/UL (ref 4–11)

## 2022-05-19 PROCEDURE — G0463 HOSPITAL OUTPT CLINIC VISIT: HCPCS

## 2022-05-19 PROCEDURE — 80053 COMPREHEN METABOLIC PANEL: CPT

## 2022-05-19 PROCEDURE — 96374 THER/PROPH/DIAG INJ IV PUSH: CPT

## 2022-05-19 PROCEDURE — 96401 CHEMO ANTI-NEOPL SQ/IM: CPT

## 2022-05-19 PROCEDURE — 36415 COLL VENOUS BLD VENIPUNCTURE: CPT

## 2022-05-19 PROCEDURE — 250N000011 HC RX IP 250 OP 636: Performed by: REGISTERED NURSE

## 2022-05-19 PROCEDURE — 83521 IG LIGHT CHAINS FREE EACH: CPT

## 2022-05-19 PROCEDURE — 84165 PROTEIN E-PHORESIS SERUM: CPT | Mod: 26

## 2022-05-19 PROCEDURE — 99214 OFFICE O/P EST MOD 30 MIN: CPT | Performed by: REGISTERED NURSE

## 2022-05-19 PROCEDURE — 82784 ASSAY IGA/IGD/IGG/IGM EACH: CPT

## 2022-05-19 PROCEDURE — 258N000003 HC RX IP 258 OP 636: Performed by: STUDENT IN AN ORGANIZED HEALTH CARE EDUCATION/TRAINING PROGRAM

## 2022-05-19 PROCEDURE — 250N000011 HC RX IP 250 OP 636: Performed by: STUDENT IN AN ORGANIZED HEALTH CARE EDUCATION/TRAINING PROGRAM

## 2022-05-19 PROCEDURE — 84165 PROTEIN E-PHORESIS SERUM: CPT | Mod: TC | Performed by: PATHOLOGY

## 2022-05-19 PROCEDURE — 85025 COMPLETE CBC W/AUTO DIFF WBC: CPT

## 2022-05-19 PROCEDURE — 84155 ASSAY OF PROTEIN SERUM: CPT

## 2022-05-19 PROCEDURE — 250N000013 HC RX MED GY IP 250 OP 250 PS 637: Performed by: STUDENT IN AN ORGANIZED HEALTH CARE EDUCATION/TRAINING PROGRAM

## 2022-05-19 RX ORDER — ACETAMINOPHEN 325 MG/1
650 TABLET ORAL ONCE
Status: COMPLETED | OUTPATIENT
Start: 2022-05-19 | End: 2022-05-19

## 2022-05-19 RX ORDER — MONTELUKAST SODIUM 10 MG/1
10 TABLET ORAL ONCE
Status: COMPLETED | OUTPATIENT
Start: 2022-05-19 | End: 2022-05-19

## 2022-05-19 RX ORDER — DEXAMETHASONE 4 MG/1
20 TABLET ORAL ONCE
Status: COMPLETED | OUTPATIENT
Start: 2022-05-19 | End: 2022-05-19

## 2022-05-19 RX ORDER — DIPHENHYDRAMINE HCL 25 MG
50 CAPSULE ORAL ONCE
Status: COMPLETED | OUTPATIENT
Start: 2022-05-19 | End: 2022-05-19

## 2022-05-19 RX ADMIN — ZOLEDRONIC ACID 3 MG: 4 INJECTION, SOLUTION, CONCENTRATE INTRAVENOUS at 15:26

## 2022-05-19 RX ADMIN — DEXAMETHASONE 20 MG: 4 TABLET ORAL at 14:59

## 2022-05-19 RX ADMIN — SODIUM CHLORIDE 250 ML: 9 INJECTION, SOLUTION INTRAVENOUS at 15:26

## 2022-05-19 RX ADMIN — MONTELUKAST 10 MG: 10 TABLET, FILM COATED ORAL at 14:59

## 2022-05-19 RX ADMIN — DIPHENHYDRAMINE HYDROCHLORIDE 50 MG: 25 CAPSULE ORAL at 14:59

## 2022-05-19 RX ADMIN — DARATUMUMAB AND HYALURONIDASE-FIHJ (HUMAN RECOMBINANT) 1800 MG: 1800; 30000 INJECTION SUBCUTANEOUS at 15:28

## 2022-05-19 RX ADMIN — ACETAMINOPHEN 650 MG: 325 TABLET ORAL at 14:59

## 2022-05-19 ASSESSMENT — PAIN SCALES - GENERAL: PAINLEVEL: NO PAIN (0)

## 2022-05-19 NOTE — PROGRESS NOTES
Infusion Nursing Note:  June Ronquillo presents today for Cycle 5 Day 1 Darzalex.  Zometa.    Patient seen by provider today: Yes: Laura Bustamante DOROTA   present during visit today: Not Applicable.    Note: Evaluated by Laura Bustamante DOROTA.  Per Wilber in pharmacy, we could go either way with Darzalex pre meds today.  Next cycle they will go PRN.  Logan would prefer to get all pre meds today as this is his 3rd dose of a new treatment plan.    Due for Zometa.  Denies any current dental issues or jaw pain.      Intravenous Access:  Peripheral IV placed in lab    Treatment Conditions:  Lab Results   Component Value Date    HGB 11.6 (L) 05/19/2022    WBC 5.8 05/19/2022    ANEU 7.4 11/29/2021    ANEUTAUTO 2.5 05/19/2022     05/19/2022      Lab Results   Component Value Date     05/19/2022    POTASSIUM 3.8 05/19/2022    MAG 1.8 05/09/2022    CR 1.07 05/19/2022    HARDEEP 8.8 05/19/2022    BILITOTAL 0.4 05/19/2022    ALBUMIN 3.8 05/19/2022    ALT 27 05/19/2022    AST 25 05/19/2022     Results reviewed, labs MET treatment parameters, ok to proceed with treatment.      Post Infusion Assessment:  Patient tolerated infusion without incident.  Patient tolerated subcutaneous Darzalex injection without incident to LLQ of abdomen.  Blood return noted pre and post infusion.  Site patent and intact, free from redness, edema or discomfort.  No evidence of extravasations.  Access discontinued per protocol.       Discharge Plan:   Patient declined prescription refills.  AVS to patient via [employer].  Patient will return in one month (not yet scheduled) for next appointment.   Patient discharged in stable condition accompanied by: self.  Departure Mode: Ambulatory.  Face to Face time: 0.      Linda Chappell RN

## 2022-05-19 NOTE — LETTER
5/19/2022         RE: June Ronquillo  [address]  [facility] [address]        Dear Colleague,    Thank you for referring your patient, June Ronquillo, to the [facility]. Please see a copy of my visit note below.    Oncologic Hx:   - 4/30/2021 M spike of 34.8 in urine.M spike in blood 0.2; IgG 702 with depressed IgA and IgM. Beta 2 microglobulin 2.7. Lambda  with K/L ratio of 0.01. WBC 11.1 with absolute eos of 1.0. hgb, plt, Cr (1.1), and albumin WNL.    -4/30/2021 CT abd/pelvis showed sclerotic marrow changes with numerous lytic appearing lesions throughout the imaged portions of the spine, pelvis and ribs.      -PET scan 5/11/2021 Numerous osteolytic lesions which predominantly demonstrate uptake below liver background levels. There is one intraosseous lesion in the left lateral 9th rib that demonstrates uptake above background, possibly due to nondisplaced fracture. Adjacent to this lesion is mild  hypermetabolism to the left pleura, with new small left pleural effusion, suspicious for malignant effusion versus posttraumatic effusion. Pleural uptake may represent extramedullary myeloma extending along the intercostal space versus inflammation.    - BM bx 5/17/2021 with flow showing 4.0% plasma cells which express CD19 (dim), CD38 (bright), CD45 (dim), , and monotypic cytoplasmic lambda immunoglobulin light chains but lack CD20 and CD56.  Hypercellular bone marrow for age (approximately 75% cellularity) with adequate trilineage hematopoiesis. Plasma cell infiltrate: Interstitial, lambda monotypic and representing approximately 60% the bone marrow cellularity, by  immunohistochemical stain. Cytpgenetics with 2 related hypodiploid clones. One with loss of Y, monosomy 13 and 14 (5%) and one with translocation of 8p and 14, derivative 16 with long arm replaced by extra copy 1q,monosomy 21. FISH showed gain of 1q, loss of 13 and 14, IGH-MYC fusion  "t(8:14)    - Started Miracle-RVd 21 day with velcade on days 1,8,15.     -Cycle 2 Miracle-RVd. Dose reduce dex to 80 mg d/t fatigue and stomach upset on dex days. Also having cough with basilar streaking on CXR    - 8/31/2021 BM bx -rare suspicious plasma cells in touch imprint. No increase in plasma cells by morph.    -8/31/2021 PET/CT Diffuse osteolytic lesions throughout the axial and appendicular skeleton. Increased sclerosis since prior exam 5/11/2021 without increased metabolism or size.  Hypermetabolic pretracheal lymph node as well as hypermetabolic level 2 lymph nodes within the right neck. These lymph nodes are likely reactive from autoimmune activation via medical therapy. Hypermetabolic subcentimeter nodules within the thyroid gland    - 11/11/2021 Autologous BMT    - 2/21/22 started maintenance with Revlimid 10 mg daily and daratumumab monthly; did Revlimid alone for C1 due to low IgG levels, added daratumumab starting C2    Interval Hx:   He feels well overall.  He had a thyroidectomy about 2 weeks ago and is recovering well. He is tolerating therapy well without any major side effects. Neuropathy is very mild; he feels it is triggered by certain foods, so he avoids those in his diet. He reports having a rash on his lateral left thigh that has recently been assessed by Deborah and Dr. Du Wheeler.  He notes that a dermatology referral was placed for further evaluation.    Patient denies fevers, chills, night sweats, unexplained weight changes, headaches, dizziness, vision or hearing changes, new lumps or bumps, chest pain, shortness of breath, cough, abdominal pain, nausea, vomiting, changes to bowel or bladder, swelling of extremities, bleeding issues, or rash.      A comprehensive review of systems was completed and negative except as described above.       Physical Exam:  /78   Pulse 98   Temp 98.2  F (36.8  C)   Resp 18   Ht 1.651 m (5' 5\")   Wt 73.8 kg (162 lb 11.2 oz)   SpO2 99%   BMI 27.07 " kg/m    General: No acute distress  HEENT: Sclera anicteric. Oral mucosa pink and moist.  No mucositis or thrush  Lymph: No lymphadenopathy in neck  Heart: Regular, rate, and rhythm  Lungs: Clear to ascultation bilaterally  Abdomen: Positive bowel sounds. Soft, non-distended, non-tender. No organomegaly or mass.   Extremities: no lower extremity edema  Neuro: Cranial nerves grossly intact  Rash: reports rash on left thigh, but declined assessment today as it has recently been assessed by two other providers and he has a derm referral pending      Labs:  I personally reviewed the following labs:    Most Recent 3 CBC's:  Recent Labs   Lab Test 05/19/22  1352 05/16/22  1717 05/09/22  0928   WBC 5.8 6.0 5.2   HGB 11.6* 11.6* 12.0*   MCV 89 91 90    373 321     Most Recent 3 BMP's:  Recent Labs   Lab Test 05/19/22  1352 05/09/22  0928 04/22/22  0643 03/24/22  1503    140 140 139   POTASSIUM 3.8 4.0 4.3 3.8   CHLORIDE 110* 106 109 109   CO2  --  26 25 23   BUN  --  13 16 12   CR  --  0.99 1.02 1.06   ANIONGAP  --  8 6 7   HARDEEP  --  9.3 8.8 8.6   GLC  --  106* 117* 99     Most Recent 2 LFT's:  Recent Labs   Lab Test 05/09/22  0928 04/22/22  0643   AST 26 29   ALT 27 29   ALKPHOS 54 54   BILITOTAL 0.4 0.3       Assessment and Plan  Logan has multiple myeloma with high risk cytogenetics. He got Vidya-RVD with VGPR, then underwent autologous transplant 11/11/21. Continue acyclovir daily for viral ppx and bactrim M/T for PJP ppx. Given his high risk cytogenetics he started vidya + revlimid maintenance therapy 2/21/22. Follow IgG and replete if it remains <300. He had severe flu-like effects from his first dose of Zometa but he has been tolerating monthly Zometa dosed at 3 mg. Continue Vit D. Continue B vitamin supplement to help his neuropathy       38 minutes spent on the date of the encounter doing chart review, review of test results, patient visit and documentation       Again, thank you for allowing me to  participate in the care of your patient.      Sincerely,    PATTY Solis CNP

## 2022-05-19 NOTE — NURSING NOTE
"Oncology Rooming Note    May 19, 2022 2:03 PM   June Ronquillo is a 51 year old male who presents for:    Chief Complaint   Patient presents with     Labs Only     PIV placed, vitals checked     Oncology Clinic Visit     [facility] RETURN - MULTIPLE MYELOMA     Initial Vitals: /78   Pulse 98   Temp 98.2  F (36.8  C)   Resp 18   Ht 1.651 m (5' 5\")   Wt 73.8 kg (162 lb 11.2 oz)   SpO2 99%   BMI 27.07 kg/m   Estimated body mass index is 27.07 kg/m  as calculated from the following:    Height as of this encounter: 1.651 m (5' 5\").    Weight as of this encounter: 73.8 kg (162 lb 11.2 oz). Body surface area is 1.84 meters squared.  No Pain (0) Comment: Data Unavailable   No LMP for male patient.  Allergies reviewed: Yes  Medications reviewed: Yes    Medications: Medication refills not needed today.  Pharmacy name entered into [facility]:    [facility] [place], [place] - [address]  [facility] - [place], [place] - [address] [place] [address] PHARMACY [place], [place] - [phone]    Clinical concerns: No new concerns. Dianna was notified.      Juan Saenz LPN            "

## 2022-05-19 NOTE — NURSING NOTE
Chief Complaint   Patient presents with     Labs Only     PIV placed, vitals checked     Alyce Gibbs RN on 5/19/2022 at 1:56 PM

## 2022-05-20 LAB
ALBUMIN SERPL ELPH-MCNC: 4.3 G/DL (ref 3.7–5.1)
ALPHA1 GLOB SERPL ELPH-MCNC: 0.3 G/DL (ref 0.2–0.4)
ALPHA2 GLOB SERPL ELPH-MCNC: 0.6 G/DL (ref 0.5–0.9)
B-GLOBULIN SERPL ELPH-MCNC: 0.8 G/DL (ref 0.6–1)
GAMMA GLOB SERPL ELPH-MCNC: 0.3 G/DL (ref 0.7–1.6)
IGA SERPL-MCNC: 32 MG/DL (ref 84–499)
IGG SERPL-MCNC: 302 MG/DL (ref 610–1616)
IGM SERPL-MCNC: 22 MG/DL (ref 35–242)
KAPPA LC FREE SER-MCNC: 0.76 MG/DL (ref 0.33–1.94)
KAPPA LC FREE/LAMBDA FREE SER NEPH: 2.05 {RATIO} (ref 0.26–1.65)
LAMBDA LC FREE SERPL-MCNC: 0.37 MG/DL (ref 0.57–2.63)
M PROTEIN SERPL ELPH-MCNC: 0.1 G/DL
PROT PATTERN SERPL ELPH-IMP: ABNORMAL

## 2022-05-23 ENCOUNTER — TELEPHONE (OUTPATIENT)
Dept: ONCOLOGY | Facility: CLINIC | Age: 52
End: 2022-05-23
Payer: COMMERCIAL

## 2022-05-23 DIAGNOSIS — C73 MALIGNANT NEOPLASM OF THYROID GLAND (H): ICD-10-CM

## 2022-05-23 DIAGNOSIS — C90.00 MULTIPLE MYELOMA NOT HAVING ACHIEVED REMISSION (H): ICD-10-CM

## 2022-05-23 RX ORDER — LENALIDOMIDE 10 MG/1
10 CAPSULE ORAL DAILY
Qty: 28 CAPSULE | Refills: 0 | Status: SHIPPED | OUTPATIENT
Start: 2022-05-23 | End: 2022-06-24

## 2022-05-23 NOTE — TELEPHONE ENCOUNTER
Last prescribing provider Dr. Vazquez     Last clinic visit date 5/19/22     Any missed appointments/no show since last visit  No     Recommendations for requested medication multiple myeloma maintenance therapy    Next clinic date 6/17/22    Any other pertinent information No

## 2022-05-23 NOTE — TELEPHONE ENCOUNTER
Oral Chemotherapy Monitoring Program   Medication: Revlimid  Rx: 10mg PO daily on days 1 through 28 of 28 day cycle   Auth #: [id number]   Risk Category: Adult Male  Routine survey questions reviewed.   Rx to be Escribed to [facility]  [facility]  Oncology Pharmacy Liaison   [email or web address]  [phone] (phone)  [phone] (fax)

## 2022-05-26 RX ORDER — LEVOTHYROXINE SODIUM 137 UG/1
137 TABLET ORAL DAILY
Qty: 21 TABLET | Refills: 0 | Status: SHIPPED | OUTPATIENT
Start: 2022-05-26 | End: 2022-06-22 | Stop reason: DRUGHIGH

## 2022-05-31 DIAGNOSIS — Z00.6 RESEARCH STUDY PATIENT: Primary | ICD-10-CM

## 2022-06-06 ENCOUNTER — APPOINTMENT (OUTPATIENT)
Dept: LAB | Facility: CLINIC | Age: 52
End: 2022-06-06
Payer: COMMERCIAL

## 2022-06-06 ENCOUNTER — ALLIED HEALTH/NURSE VISIT (OUTPATIENT)
Dept: ONCOLOGY | Facility: CLINIC | Age: 52
End: 2022-06-06
Payer: COMMERCIAL

## 2022-06-06 VITALS
BODY MASS INDEX: 27.27 KG/M2 | WEIGHT: 163.9 LBS | OXYGEN SATURATION: 100 % | HEART RATE: 84 BPM | RESPIRATION RATE: 18 BRPM | TEMPERATURE: 98.4 F | SYSTOLIC BLOOD PRESSURE: 120 MMHG | DIASTOLIC BLOOD PRESSURE: 68 MMHG

## 2022-06-06 DIAGNOSIS — Z00.6 RESEARCH STUDY PATIENT: ICD-10-CM

## 2022-06-06 DIAGNOSIS — G62.9 PERIPHERAL POLYNEUROPATHY: ICD-10-CM

## 2022-06-06 DIAGNOSIS — Z94.81 STATUS POST BONE MARROW TRANSPLANT (H): ICD-10-CM

## 2022-06-06 DIAGNOSIS — C90.00 MULTIPLE MYELOMA NOT HAVING ACHIEVED REMISSION (H): ICD-10-CM

## 2022-06-06 LAB
ALBUMIN SERPL-MCNC: 3.7 G/DL (ref 3.4–5)
ALP SERPL-CCNC: 55 U/L (ref 40–150)
ALT SERPL W P-5'-P-CCNC: 27 U/L (ref 0–70)
ANION GAP SERPL CALCULATED.3IONS-SCNC: 9 MMOL/L (ref 3–14)
AST SERPL W P-5'-P-CCNC: 23 U/L (ref 0–45)
BASOPHILS # BLD AUTO: 0.1 10E3/UL (ref 0–0.2)
BASOPHILS NFR BLD AUTO: 1 %
BILIRUB SERPL-MCNC: 0.4 MG/DL (ref 0.2–1.3)
BUN SERPL-MCNC: 12 MG/DL (ref 7–30)
CALCIUM SERPL-MCNC: 8.7 MG/DL (ref 8.5–10.1)
CHLORIDE BLD-SCNC: 109 MMOL/L (ref 94–109)
CO2 SERPL-SCNC: 25 MMOL/L (ref 20–32)
CREAT SERPL-MCNC: 1.01 MG/DL (ref 0.66–1.25)
EOSINOPHIL # BLD AUTO: 0.7 10E3/UL (ref 0–0.7)
EOSINOPHIL NFR BLD AUTO: 11 %
ERYTHROCYTE [DISTWIDTH] IN BLOOD BY AUTOMATED COUNT: 16.6 % (ref 10–15)
GFR SERPL CREATININE-BSD FRML MDRD: 89 ML/MIN/1.73M2
GLUCOSE BLD-MCNC: 115 MG/DL (ref 70–99)
HCT VFR BLD AUTO: 34 % (ref 40–53)
HGB BLD-MCNC: 11.2 G/DL (ref 13.3–17.7)
IMM GRANULOCYTES # BLD: 0 10E3/UL
IMM GRANULOCYTES NFR BLD: 0 %
LYMPHOCYTES # BLD AUTO: 1.7 10E3/UL (ref 0.8–5.3)
LYMPHOCYTES NFR BLD AUTO: 25 %
MCH RBC QN AUTO: 30.2 PG (ref 26.5–33)
MCHC RBC AUTO-ENTMCNC: 32.9 G/DL (ref 31.5–36.5)
MCV RBC AUTO: 92 FL (ref 78–100)
MONOCYTES # BLD AUTO: 0.9 10E3/UL (ref 0–1.3)
MONOCYTES NFR BLD AUTO: 13 %
NEUTROPHILS # BLD AUTO: 3.4 10E3/UL (ref 1.6–8.3)
NEUTROPHILS NFR BLD AUTO: 50 %
NRBC # BLD AUTO: 0 10E3/UL
NRBC BLD AUTO-RTO: 0 /100
PLATELET # BLD AUTO: 335 10E3/UL (ref 150–450)
POTASSIUM BLD-SCNC: 3.4 MMOL/L (ref 3.4–5.3)
PROT SERPL-MCNC: 6.1 G/DL (ref 6.8–8.8)
RBC # BLD AUTO: 3.71 10E6/UL (ref 4.4–5.9)
SODIUM SERPL-SCNC: 143 MMOL/L (ref 133–144)
WBC # BLD AUTO: 6.8 10E3/UL (ref 4–11)

## 2022-06-06 PROCEDURE — 0052A HC ADMIN COVID VAC PFIZER TRS-SUCR, 2ND DOSE: CPT

## 2022-06-06 PROCEDURE — 85025 COMPLETE CBC W/AUTO DIFF WBC: CPT

## 2022-06-06 PROCEDURE — 36415 COLL VENOUS BLD VENIPUNCTURE: CPT

## 2022-06-06 PROCEDURE — 99001 SPECIMEN HANDLING PT-LAB: CPT

## 2022-06-06 PROCEDURE — 250N000011 HC RX IP 250 OP 636

## 2022-06-06 PROCEDURE — 91305 HC RX IP 250 OP 636: CPT

## 2022-06-06 PROCEDURE — 80053 COMPREHEN METABOLIC PANEL: CPT

## 2022-06-06 RX ADMIN — BNT162B2 30 MCG: 0.23 INJECTION, SUSPENSION INTRAMUSCULAR at 16:58

## 2022-06-06 ASSESSMENT — PAIN SCALES - GENERAL: PAINLEVEL: NO PAIN (0)

## 2022-06-06 NOTE — NURSING NOTE
Chief Complaint   Patient presents with     Blood Draw     Labs drawn by RN via , vitals taken.     Labs collected from venipuncture by RN. Vitals taken. Checked in for appointment(s).    Niurka Leon RN

## 2022-06-06 NOTE — NURSING NOTE
The Covid19 Vaccine, Pfizer,  was administered today per doctor Bachanova orders. Vaccine (Manufacture and Exp) was confirmed three times and there was no counterindications. Name and  was verified with Patient. Consent form was given and completed, Consent form was put in scanning in [employer], Vaccine card was updated, and Pt was instructed to wait 15 mins.     Citlalli Smith, EMT on 2022 at 5:08 PM

## 2022-06-07 ENCOUNTER — TELEPHONE (OUTPATIENT)
Dept: ONCOLOGY | Facility: CLINIC | Age: 52
End: 2022-06-07
Payer: COMMERCIAL

## 2022-06-07 NOTE — TELEPHONE ENCOUNTER
Oral Chemotherapy Monitoring Program    Subjective/Objective:  June Ronquillo is a 51 year old male contacted by phone for a follow-up visit for oral chemotherapy.  Pt confirms taking the appropriate dose of Revlimid, 10mg daily. Denies missed doses, and recent hospital or ED visits. Patient recently had his thyroid removed and started levothyroxine (no DDIs with Revlimid).  Pt states since he started levothyroxine, he has had an increased sense of feeling full all the time.  This is not affecting his appetite.  Pt states he is slightly constipated, and he combats this by increasing intake and fruits and vegetables.  Pt is not interested in taking a medication for constipation until he gets his TSH checked again, and knows how the levothyroxine is affecting his levels.    ORAL CHEMOTHERAPY 2/25/2022 3/9/2022 3/29/2022 4/26/2022 5/15/2022 5/24/2022 6/7/2022   Assessment Type New Teach Initial Follow up Refill Refill Chart Review Refill Lab Monitoring;Monthly Follow up   Stop Date - - - - - - -   Reason for Discontinuation - - - - - - -   Diagnosis Code Multiple Myeloma Multiple Myeloma Multiple Myeloma Multiple Myeloma Multiple Myeloma Multiple Myeloma Multiple Myeloma   Providers Dr. George Vazquez   [facility]   Drug Name Revlimid (lenalidomide) Revlimid (lenalidomide) Revlimid (lenalidomide) Revlimid (lenalidomide) Revlimid (lenalidomide) Revlimid (lenalidomide) Revlimid (lenalidomide)   Dose 10 mg 10 mg 10 mg 10 mg 10 mg 10 mg 10 mg   Current Schedule Daily Daily Daily Daily Daily Daily Daily   Cycle Details Continuous Continuous Continuous Continuous Continuous Continuous Continuous   Start Date of Last Cycle - - - - - - -   Planned next cycle start date - - - - - - -   Doses missed in last 2 weeks - - - - - - 0   Adherence Assessment - - - - - - Adherent   Adverse Effects - - - - - -  "No AE identified during assessment   Any new drug interactions? No - - - - - No   Is the dose as ordered appropriate for the patient? Yes - - - - - Yes   Since the last time we talked, have you been hospitalized or used the emergency room? - - - - - - No       Last PHQ-2 Score on record:   PHQ-2 ( 1999 Pfizer) 4/19/2022 11/29/2021   Q1: Little interest or pleasure in doing things 0 0   Q2: Feeling down, depressed or hopeless 0 0   PHQ-2 Score 0 0   PHQ-2 Total Score (12-17 Years)- Positive if 3 or more points; Administer PHQ-A if positive - 0   Q1: Little interest or pleasure in doing things Not at all -   Q2: Feeling down, depressed or hopeless Not at all -   PHQ-2 Score 0 -       Vitals:  BP:   BP Readings from Last 1 Encounters:   06/06/22 120/68     Wt Readings from Last 1 Encounters:   06/06/22 74.3 kg (163 lb 14.4 oz)     Estimated body surface area is 1.85 meters squared as calculated from the following:    Height as of 5/19/22: 1.651 m (5' 5\").    Weight as of 6/6/22: 74.3 kg (163 lb 14.4 oz).    Labs:  _  Result Component Current Result Ref Range   Sodium 143 (6/6/2022) 133 - 144 mmol/L     _  Result Component Current Result Ref Range   Potassium 3.4 (6/6/2022) 3.4 - 5.3 mmol/L     _  Result Component Current Result Ref Range   Calcium 8.7 (6/6/2022) 8.5 - 10.1 mg/dL     _  Result Component Current Result Ref Range   Magnesium 1.8 (5/9/2022) 1.6 - 2.3 mg/dL     _  Result Component Current Result Ref Range   Phosphorus 3.0 (5/9/2022) 2.5 - 4.5 mg/dL     _  Result Component Current Result Ref Range   Albumin 3.7 (6/6/2022) 3.4 - 5.0 g/dL     _  Result Component Current Result Ref Range   Urea Nitrogen 12 (6/6/2022) 7 - 30 mg/dL     _  Result Component Current Result Ref Range   Creatinine 1.01 (6/6/2022) 0.66 - 1.25 mg/dL     _  Result Component Current Result Ref Range   AST 23 (6/6/2022) 0 - 45 U/L     _  Result Component Current Result Ref Range   ALT 27 (6/6/2022) 0 - 70 U/L     _  Result Component " Current Result Ref Range   Bilirubin Total 0.4 (6/6/2022) 0.2 - 1.3 mg/dL     _  Result Component Current Result Ref Range   WBC Count 6.8 (6/6/2022) 4.0 - 11.0 10e3/uL     _  Result Component Current Result Ref Range   Hemoglobin 11.2 (L) (6/6/2022) 13.3 - 17.7 g/dL     _  Result Component Current Result Ref Range   Platelet Count 335 (6/6/2022) 150 - 450 10e3/uL     No results found for ANC within last 30 days.     _  Result Component Current Result Ref Range   Absolute Neutrophils 3.4 (6/6/2022) 1.6 - 8.3 10e3/uL       Assessment/Plan:  Pt is tolerating Revlimid well.  Increased sense of fullness may be from levothyroxine use, pt to monitor this.  Pt to monitor constipation, and will call if he would like to talk about pharmacologic options for this in addition to increasing fiber intake. Continue current therapy as planned.    Follow-Up:  6/17: Review appt with Laura and labs    Grace Myhre, PharmD  Hematology/Oncology Pharmacist  [facility]  [phone]

## 2022-06-16 NOTE — PROGRESS NOTES
Oncologic Hx:   - 4/30/2021 M spike of 34.8 in urine.M spike in blood 0.2; IgG 702 with depressed IgA and IgM. Beta 2 microglobulin 2.7. Lambda  with K/L ratio of 0.01. WBC 11.1 with absolute eos of 1.0. hgb, plt, Cr (1.1), and albumin WNL.    -4/30/2021 CT abd/pelvis showed sclerotic marrow changes with numerous lytic appearing lesions throughout the imaged portions of the spine, pelvis and ribs.      -PET scan 5/11/2021 Numerous osteolytic lesions which predominantly demonstrate uptake below liver background levels. There is one intraosseous lesion in the left lateral 9th rib that demonstrates uptake above background, possibly due to nondisplaced fracture. Adjacent to this lesion is mild  hypermetabolism to the left pleura, with new small left pleural effusion, suspicious for malignant effusion versus posttraumatic effusion. Pleural uptake may represent extramedullary myeloma extending along the intercostal space versus inflammation.    - BM bx 5/17/2021 with flow showing 4.0% plasma cells which express CD19 (dim), CD38 (bright), CD45 (dim), , and monotypic cytoplasmic lambda immunoglobulin light chains but lack CD20 and CD56.  Hypercellular bone marrow for age (approximately 75% cellularity) with adequate trilineage hematopoiesis. Plasma cell infiltrate: Interstitial, lambda monotypic and representing approximately 60% the bone marrow cellularity, by  immunohistochemical stain. Cytpgenetics with 2 related hypodiploid clones. One with loss of Y, monosomy 13 and 14 (5%) and one with translocation of 8p and 14, derivative 16 with long arm replaced by extra copy 1q,monosomy 21. FISH showed gain of 1q, loss of 13 and 14, IGH-MYC fusion t(8:14)    - Started Miracle-RVd 21 day with velcade on days 1,8,15.     -Cycle 2 Miracle-RVd. Dose reduce dex to 80 mg d/t fatigue and stomach upset on dex days. Also having cough with basilar streaking on CXR    - 8/31/2021 BM bx -rare suspicious plasma cells in touch  imprint. No increase in plasma cells by morph.    -8/31/2021 PET/CT Diffuse osteolytic lesions throughout the axial and appendicular skeleton. Increased sclerosis since prior exam 5/11/2021 without increased metabolism or size.  Hypermetabolic pretracheal lymph node as well as hypermetabolic level 2 lymph nodes within the right neck. These lymph nodes are likely reactive from autoimmune activation via medical therapy. Hypermetabolic subcentimeter nodules within the thyroid gland    - 11/11/2021 Autologous BMT    - 2/21/22 started maintenance with Revlimid 10 mg daily and daratumumab monthly; did Revlimid alone for C1 due to low IgG levels, added daratumumab starting C2    Interval Hx:   He feels well overall.  Continues to recover well from his thyroidectomy in May. He is tolerating therapy well without any major side effects. Neuropathy is very mild; he feels it is triggered by certain foods, so he avoids those in his diet. He still has not been seen by dermatology for the new mole on his left thigh; it continues to itch.    Patient denies fevers, chills, night sweats, unexplained weight changes, headaches, dizziness, vision or hearing changes, new lumps or bumps, chest pain, shortness of breath, cough, abdominal pain, nausea, vomiting, changes to bowel or bladder, swelling of extremities, bleeding issues, or rash.      A comprehensive review of systems was completed and negative except as described above.       Physical Exam:  /76 (BP Location: Right arm, Patient Position: Sitting, Cuff Size: Adult Regular)   Pulse 83   Temp 98.4  F (36.9  C) (Oral)   Resp 16   Wt 73.2 kg (161 lb 6.4 oz)   SpO2 99%   BMI 26.86 kg/m    General: No acute distress  HEENT: Sclera anicteric. Oral mucosa pink and moist.  No mucositis or thrush  Lymph: No lymphadenopathy in neck  Heart: Regular, rate, and rhythm  Lungs: Expiratory wheezes in right upper lobe.  Abdomen: Positive bowel sounds. Soft, non-distended, non-tender.  No organomegaly or mass.   Extremities: no lower extremity edema  Neuro: Cranial nerves grossly intact  Rash: reports rash on left thigh, but declined assessment today as it has recently been assessed by two other providers and he has a derm referral pending      Labs:  I personally reviewed the following labs:    Most Recent 3 CBC's:  Recent Labs   Lab Test 06/17/22  1332 06/06/22  1751 05/19/22  1352   WBC 5.3 6.8 5.8   HGB 11.6* 11.2* 11.6*   MCV 92 92 89    335 376     Most Recent 3 BMP's:  Recent Labs   Lab Test 06/17/22  1332 06/06/22  1635 05/19/22  1352    143 140   POTASSIUM 3.5 3.4 3.8   CHLORIDE 108 109 110*   CO2 22 25 23   BUN 17 12 16   CR 0.97 1.01 1.07   ANIONGAP 9 9 7   HARDEEP 9.5 8.7 8.8   * 115* 146*     Most Recent 2 LFT's:  Recent Labs   Lab Test 06/17/22  1332 06/06/22  1635   AST 20 23   ALT 26 27   ALKPHOS 54 55   BILITOTAL 0.5 0.4       Assessment and Plan  Logan has multiple myeloma with high risk cytogenetics. He got Miracle-RVD with VGPR, then underwent autologous transplant 11/11/21. Continue acyclovir daily for viral ppx and bactrim M/T for PJP ppx. Given his high risk cytogenetics he started miracle + revlimid maintenance therapy 2/21/22. Follow IgG and replete if it remains <300. He had severe flu-like effects from his first dose of Zometa but he has been tolerating monthly Zometa dosed at 3 mg. Continue Vit D. Continue B vitamin supplement to help his neuropathy       35 minutes spent on the date of the encounter doing chart review, review of test results, patient visit and documentation     PATTY Solis CNP  [facility] [id number] [place] [address] [phone]

## 2022-06-17 ENCOUNTER — ONCOLOGY VISIT (OUTPATIENT)
Dept: ONCOLOGY | Facility: CLINIC | Age: 52
End: 2022-06-17
Attending: REGISTERED NURSE
Payer: COMMERCIAL

## 2022-06-17 ENCOUNTER — APPOINTMENT (OUTPATIENT)
Dept: LAB | Facility: CLINIC | Age: 52
End: 2022-06-17
Attending: REGISTERED NURSE
Payer: COMMERCIAL

## 2022-06-17 ENCOUNTER — TELEPHONE (OUTPATIENT)
Dept: ONCOLOGY | Facility: CLINIC | Age: 52
End: 2022-06-17
Payer: COMMERCIAL

## 2022-06-17 VITALS
HEART RATE: 83 BPM | BODY MASS INDEX: 26.86 KG/M2 | RESPIRATION RATE: 16 BRPM | WEIGHT: 161.4 LBS | TEMPERATURE: 98.4 F | OXYGEN SATURATION: 99 % | SYSTOLIC BLOOD PRESSURE: 127 MMHG | DIASTOLIC BLOOD PRESSURE: 76 MMHG

## 2022-06-17 DIAGNOSIS — E06.3 HYPOTHYROIDISM DUE TO HASHIMOTO'S THYROIDITIS: ICD-10-CM

## 2022-06-17 DIAGNOSIS — C90.00 MULTIPLE MYELOMA NOT HAVING ACHIEVED REMISSION (H): Primary | ICD-10-CM

## 2022-06-17 DIAGNOSIS — R73.03 PRE-DIABETES: ICD-10-CM

## 2022-06-17 DIAGNOSIS — L98.9 SKIN LESION: ICD-10-CM

## 2022-06-17 LAB
ALBUMIN SERPL-MCNC: 4 G/DL (ref 3.4–5)
ALP SERPL-CCNC: 54 U/L (ref 40–150)
ALT SERPL W P-5'-P-CCNC: 26 U/L (ref 0–70)
ANION GAP SERPL CALCULATED.3IONS-SCNC: 9 MMOL/L (ref 3–14)
AST SERPL W P-5'-P-CCNC: 20 U/L (ref 0–45)
BASOPHILS # BLD AUTO: 0 10E3/UL (ref 0–0.2)
BASOPHILS NFR BLD AUTO: 1 %
BILIRUB SERPL-MCNC: 0.5 MG/DL (ref 0.2–1.3)
BUN SERPL-MCNC: 17 MG/DL (ref 7–30)
CALCIUM SERPL-MCNC: 9.5 MG/DL (ref 8.5–10.1)
CHLORIDE BLD-SCNC: 108 MMOL/L (ref 94–109)
CO2 SERPL-SCNC: 22 MMOL/L (ref 20–32)
CREAT SERPL-MCNC: 0.97 MG/DL (ref 0.66–1.25)
EOSINOPHIL # BLD AUTO: 0.6 10E3/UL (ref 0–0.7)
EOSINOPHIL NFR BLD AUTO: 11 %
ERYTHROCYTE [DISTWIDTH] IN BLOOD BY AUTOMATED COUNT: 16.3 % (ref 10–15)
GFR SERPL CREATININE-BSD FRML MDRD: >90 ML/MIN/1.73M2
GLUCOSE BLD-MCNC: 127 MG/DL (ref 70–99)
HBA1C MFR BLD: 5.5 % (ref 0–5.6)
HCT VFR BLD AUTO: 35 % (ref 40–53)
HGB BLD-MCNC: 11.6 G/DL (ref 13.3–17.7)
IMM GRANULOCYTES # BLD: 0 10E3/UL
IMM GRANULOCYTES NFR BLD: 0 %
LYMPHOCYTES # BLD AUTO: 2.1 10E3/UL (ref 0.8–5.3)
LYMPHOCYTES NFR BLD AUTO: 39 %
MCH RBC QN AUTO: 30.4 PG (ref 26.5–33)
MCHC RBC AUTO-ENTMCNC: 33.1 G/DL (ref 31.5–36.5)
MCV RBC AUTO: 92 FL (ref 78–100)
MONOCYTES # BLD AUTO: 0.6 10E3/UL (ref 0–1.3)
MONOCYTES NFR BLD AUTO: 12 %
NEUTROPHILS # BLD AUTO: 2 10E3/UL (ref 1.6–8.3)
NEUTROPHILS NFR BLD AUTO: 37 %
NRBC # BLD AUTO: 0 10E3/UL
NRBC BLD AUTO-RTO: 0 /100
PHOSPHATE SERPL-MCNC: 3.6 MG/DL (ref 2.5–4.5)
PLATELET # BLD AUTO: 320 10E3/UL (ref 150–450)
POTASSIUM BLD-SCNC: 3.5 MMOL/L (ref 3.4–5.3)
PROT SERPL-MCNC: 6.4 G/DL (ref 6.8–8.8)
RBC # BLD AUTO: 3.81 10E6/UL (ref 4.4–5.9)
SODIUM SERPL-SCNC: 139 MMOL/L (ref 133–144)
T4 FREE SERPL-MCNC: 1.36 NG/DL (ref 0.76–1.46)
TOTAL PROTEIN SERUM FOR ELP: 6.1 G/DL (ref 6.8–8.8)
TSH SERPL DL<=0.005 MIU/L-ACNC: 0.07 MU/L (ref 0.4–4)
WBC # BLD AUTO: 5.3 10E3/UL (ref 4–11)

## 2022-06-17 PROCEDURE — 84100 ASSAY OF PHOSPHORUS: CPT | Performed by: REGISTERED NURSE

## 2022-06-17 PROCEDURE — 86334 IMMUNOFIX E-PHORESIS SERUM: CPT | Mod: 26 | Performed by: PATHOLOGY

## 2022-06-17 PROCEDURE — 99214 OFFICE O/P EST MOD 30 MIN: CPT | Performed by: REGISTERED NURSE

## 2022-06-17 PROCEDURE — 96374 THER/PROPH/DIAG INJ IV PUSH: CPT

## 2022-06-17 PROCEDURE — 84165 PROTEIN E-PHORESIS SERUM: CPT | Mod: TC | Performed by: PATHOLOGY

## 2022-06-17 PROCEDURE — 36415 COLL VENOUS BLD VENIPUNCTURE: CPT | Performed by: REGISTERED NURSE

## 2022-06-17 PROCEDURE — 83036 HEMOGLOBIN GLYCOSYLATED A1C: CPT | Performed by: REGISTERED NURSE

## 2022-06-17 PROCEDURE — 82784 ASSAY IGA/IGD/IGG/IGM EACH: CPT

## 2022-06-17 PROCEDURE — 84155 ASSAY OF PROTEIN SERUM: CPT

## 2022-06-17 PROCEDURE — 96401 CHEMO ANTI-NEOPL SQ/IM: CPT

## 2022-06-17 PROCEDURE — 258N000003 HC RX IP 258 OP 636: Performed by: STUDENT IN AN ORGANIZED HEALTH CARE EDUCATION/TRAINING PROGRAM

## 2022-06-17 PROCEDURE — 83521 IG LIGHT CHAINS FREE EACH: CPT

## 2022-06-17 PROCEDURE — 80053 COMPREHEN METABOLIC PANEL: CPT

## 2022-06-17 PROCEDURE — 84443 ASSAY THYROID STIM HORMONE: CPT | Performed by: REGISTERED NURSE

## 2022-06-17 PROCEDURE — 85025 COMPLETE CBC W/AUTO DIFF WBC: CPT

## 2022-06-17 PROCEDURE — 86334 IMMUNOFIX E-PHORESIS SERUM: CPT | Performed by: PATHOLOGY

## 2022-06-17 PROCEDURE — 82040 ASSAY OF SERUM ALBUMIN: CPT

## 2022-06-17 PROCEDURE — 84439 ASSAY OF FREE THYROXINE: CPT | Performed by: REGISTERED NURSE

## 2022-06-17 PROCEDURE — 84165 PROTEIN E-PHORESIS SERUM: CPT | Mod: 26 | Performed by: PATHOLOGY

## 2022-06-17 PROCEDURE — G0463 HOSPITAL OUTPT CLINIC VISIT: HCPCS

## 2022-06-17 PROCEDURE — 250N000011 HC RX IP 250 OP 636: Performed by: STUDENT IN AN ORGANIZED HEALTH CARE EDUCATION/TRAINING PROGRAM

## 2022-06-17 RX ORDER — LENALIDOMIDE 10 MG/1
10 CAPSULE ORAL DAILY
Qty: 28 CAPSULE | Refills: 0 | Status: SHIPPED | OUTPATIENT
Start: 2022-06-17 | End: 2022-06-24

## 2022-06-17 RX ADMIN — ZOLEDRONIC ACID 3 MG: 4 INJECTION, SOLUTION, CONCENTRATE INTRAVENOUS at 14:50

## 2022-06-17 RX ADMIN — DARATUMUMAB AND HYALURONIDASE-FIHJ (HUMAN RECOMBINANT) 1800 MG: 1800; 30000 INJECTION SUBCUTANEOUS at 14:32

## 2022-06-17 RX ADMIN — SODIUM CHLORIDE 250 ML: 9 INJECTION, SOLUTION INTRAVENOUS at 14:42

## 2022-06-17 ASSESSMENT — PAIN SCALES - GENERAL: PAINLEVEL: NO PAIN (0)

## 2022-06-17 NOTE — NURSING NOTE
"Oncology Rooming Note    June 17, 2022 1:39 PM   June Ronquillo is a 51 year old male who presents for:    Chief Complaint   Patient presents with     Blood Draw     Labs drawn via piv by rn in lab. VS taken.     Oncology Clinic Visit     Multiple Myeloma     Initial Vitals: /76 (BP Location: Right arm, Patient Position: Sitting, Cuff Size: Adult Regular)   Pulse 83   Temp 98.4  F (36.9  C) (Oral)   Resp 16   Wt 73.2 kg (161 lb 6.4 oz)   SpO2 99%   BMI 26.86 kg/m   Estimated body mass index is 26.86 kg/m  as calculated from the following:    Height as of 5/19/22: 1.651 m (5' 5\").    Weight as of this encounter: 73.2 kg (161 lb 6.4 oz). Body surface area is 1.83 meters squared.  No Pain (0) Comment: Data Unavailable   No LMP for male patient.  Allergies reviewed: Yes  Medications reviewed: Yes    Medications: MEDICATION REFILLS NEEDED TODAY. Provider was notified.  Pharmacy name entered into [facility]:    [facility] [place], [place] - [address]  [facility] - [place], [place] - [address]  [facility] [place], [place] - [phone]    Clinical concerns: Synthroid refill needed today.       Niharika Marlow CMA              "

## 2022-06-17 NOTE — PATIENT INSTRUCTIONS
[facility] Triage and after hours / weekends / holidays:  [phone]    Please call the triage or after hours line if you experience a temperature greater than or equal to 100.4, shaking chills, have uncontrolled nausea, vomiting and/or diarrhea, dizziness, shortness of breath, chest pain, bleeding, unexplained bruising, or if you have any other new/concerning symptoms, questions or concerns.      If you are having any concerning symptoms or wish to speak to a provider before your next infusion visit, please call your care coordinator or triage to notify them so we can adequately serve you.     If you need a refill on a narcotic prescription or other medication, please call before your infusion appointment.

## 2022-06-17 NOTE — PROGRESS NOTES
Infusion Nursing Note:  June Ronquillo presents today for Cycle 6 Day 1 darzalex faspro; zometa.    Patient seen by provider today: Yes: Laura Bustamante CNP   present during visit today: Not Applicable.    Note: Logan presents today feeling well. Denies pain or nausea/vomiting. Offers no concerns since visit with Laura Bustamante prior to infusion.    Intravenous Access:  Peripheral IV placed.    Treatment Conditions:     Latest Reference Range & Units 06/17/22 13:32   Sodium 133 - 144 mmol/L 139   Potassium 3.4 - 5.3 mmol/L 3.5   Chloride 94 - 109 mmol/L 108   Carbon Dioxide 20 - 32 mmol/L 22   Urea Nitrogen 7 - 30 mg/dL 17   Creatinine 0.66 - 1.25 mg/dL 0.97   GFR Estimate >60 mL/min/1.73m2 >90 [1]   Calcium 8.5 - 10.1 mg/dL 9.5   Anion Gap 3 - 14 mmol/L 9   Phosphorus 2.5 - 4.5 mg/dL 3.6   Albumin 3.4 - 5.0 g/dL 4.0   Protein Total 6.8 - 8.8 g/dL 6.4 (L)   Bilirubin Total 0.2 - 1.3 mg/dL 0.5   Alkaline Phosphatase 40 - 150 U/L 54   ALT 0 - 70 U/L 26   AST 0 - 45 U/L 20   Glucose 70 - 99 mg/dL 127 (H)   WBC 4.0 - 11.0 10e3/uL 5.3   Hemoglobin 13.3 - 17.7 g/dL 11.6 (L)   Hematocrit 40.0 - 53.0 % 35.0 (L)   Platelet Count 150 - 450 10e3/uL 320   RBC Count 4.40 - 5.90 10e6/uL 3.81 (L)   MCV 78 - 100 fL 92   MCH 26.5 - 33.0 pg 30.4   MCHC 31.5 - 36.5 g/dL 33.1   RDW 10.0 - 15.0 % 16.3 (H)   % Neutrophils % 37   % Lymphocytes % 39   % Monocytes % 12   % Eosinophils % 11   % Basophils % 1   Absolute Basophils 0.0 - 0.2 10e3/uL 0.0   Absolute Eosinophils 0.0 - 0.7 10e3/uL 0.6   Absolute Immature Granulocytes <=0.4 10e3/uL 0.0   Absolute Lymphocytes 0.8 - 5.3 10e3/uL 2.1   Absolute Monocytes 0.0 - 1.3 10e3/uL 0.6   % Immature Granulocytes % 0   Absolute Neutrophils 1.6 - 8.3 10e3/uL 2.0   Absolute NRBCs 10e3/uL 0.0   NRBCs per 100 WBC <1 /100 0     Results reviewed, labs MET treatment parameters, ok to proceed with treatment.    Post Infusion Assessment:  Patient tolerated infusion without incident.  Patient  tolerated darzalex faspro injection to RLQ abdomen without incident.  Blood return noted pre and post infusion.  Site patent and intact, free from redness, edema or discomfort.  No evidence of extravasations.  Access discontinued per protocol.     Discharge Plan:   Patient declined prescription refills.  Discharge instructions reviewed with: Patient.  Patient and/or family verbalized understanding of discharge instructions and all questions answered.  AVS to patient via [facility].  Patient will return 07/15 for next infusion appointment.   Patient discharged in stable condition accompanied by: self.  Departure Mode: Ambulatory.      Lindsay Brownlee RN

## 2022-06-17 NOTE — LETTER
6/17/2022         RE: June Ronquillo  [address]  [facility] [address]        Dear Colleague,    Thank you for referring your patient, June Ronquillo, to the [facility]. Please see a copy of my visit note below.    Oncologic Hx:   - 4/30/2021 M spike of 34.8 in urine.M spike in blood 0.2; IgG 702 with depressed IgA and IgM. Beta 2 microglobulin 2.7. Lambda  with K/L ratio of 0.01. WBC 11.1 with absolute eos of 1.0. hgb, plt, Cr (1.1), and albumin WNL.    -4/30/2021 CT abd/pelvis showed sclerotic marrow changes with numerous lytic appearing lesions throughout the imaged portions of the spine, pelvis and ribs.      -PET scan 5/11/2021 Numerous osteolytic lesions which predominantly demonstrate uptake below liver background levels. There is one intraosseous lesion in the left lateral 9th rib that demonstrates uptake above background, possibly due to nondisplaced fracture. Adjacent to this lesion is mild  hypermetabolism to the left pleura, with new small left pleural effusion, suspicious for malignant effusion versus posttraumatic effusion. Pleural uptake may represent extramedullary myeloma extending along the intercostal space versus inflammation.    - BM bx 5/17/2021 with flow showing 4.0% plasma cells which express CD19 (dim), CD38 (bright), CD45 (dim), , and monotypic cytoplasmic lambda immunoglobulin light chains but lack CD20 and CD56.  Hypercellular bone marrow for age (approximately 75% cellularity) with adequate trilineage hematopoiesis. Plasma cell infiltrate: Interstitial, lambda monotypic and representing approximately 60% the bone marrow cellularity, by  immunohistochemical stain. Cytpgenetics with 2 related hypodiploid clones. One with loss of Y, monosomy 13 and 14 (5%) and one with translocation of 8p and 14, derivative 16 with long arm replaced by extra copy 1q,monosomy 21. FISH showed gain of 1q, loss of 13 and 14, IGH-MYC fusion  t(8:14)    - Started Miracle-RVd 21 day with velcade on days 1,8,15.     -Cycle 2 Miracle-RVd. Dose reduce dex to 80 mg d/t fatigue and stomach upset on dex days. Also having cough with basilar streaking on CXR    - 8/31/2021 BM bx -rare suspicious plasma cells in touch imprint. No increase in plasma cells by morph.    -8/31/2021 PET/CT Diffuse osteolytic lesions throughout the axial and appendicular skeleton. Increased sclerosis since prior exam 5/11/2021 without increased metabolism or size.  Hypermetabolic pretracheal lymph node as well as hypermetabolic level 2 lymph nodes within the right neck. These lymph nodes are likely reactive from autoimmune activation via medical therapy. Hypermetabolic subcentimeter nodules within the thyroid gland    - 11/11/2021 Autologous BMT    - 2/21/22 started maintenance with Revlimid 10 mg daily and daratumumab monthly; did Revlimid alone for C1 due to low IgG levels, added daratumumab starting C2    Interval Hx:   He feels well overall.  Continues to recover well from his thyroidectomy in May. He is tolerating therapy well without any major side effects. Neuropathy is very mild; he feels it is triggered by certain foods, so he avoids those in his diet. He still has not been seen by dermatology for the new mole on his left thigh; it continues to itch.    Patient denies fevers, chills, night sweats, unexplained weight changes, headaches, dizziness, vision or hearing changes, new lumps or bumps, chest pain, shortness of breath, cough, abdominal pain, nausea, vomiting, changes to bowel or bladder, swelling of extremities, bleeding issues, or rash.    A comprehensive review of systems was completed and negative except as described above.     Physical Exam:  /76 (BP Location: Right arm, Patient Position: Sitting, Cuff Size: Adult Regular)   Pulse 83   Temp 98.4  F (36.9  C) (Oral)   Resp 16   Wt 73.2 kg (161 lb 6.4 oz)   SpO2 99%   BMI 26.86 kg/m    General: No acute  distress  HEENT: Sclera anicteric. Oral mucosa pink and moist.  No mucositis or thrush  Lymph: No lymphadenopathy in neck  Heart: Regular, rate, and rhythm  Lungs: Expiratory wheezes in right upper lobe.  Abdomen: Positive bowel sounds. Soft, non-distended, non-tender. No organomegaly or mass.   Extremities: no lower extremity edema  Neuro: Cranial nerves grossly intact  Rash: reports rash on left thigh, but declined assessment today as it has recently been assessed by two other providers and he has a derm referral pending    Labs:  I personally reviewed the following labs:    Most Recent 3 CBC's:  Recent Labs   Lab Test 06/17/22  1332 06/06/22  1751 05/19/22  1352   WBC 5.3 6.8 5.8   HGB 11.6* 11.2* 11.6*   MCV 92 92 89    335 376     Most Recent 3 BMP's:  Recent Labs   Lab Test 06/17/22  1332 06/06/22  1635 05/19/22  1352    143 140   POTASSIUM 3.5 3.4 3.8   CHLORIDE 108 109 110*   CO2 22 25 23   BUN 17 12 16   CR 0.97 1.01 1.07   ANIONGAP 9 9 7   HARDEEP 9.5 8.7 8.8   * 115* 146*     Most Recent 2 LFT's:  Recent Labs   Lab Test 06/17/22  1332 06/06/22  1635   AST 20 23   ALT 26 27   ALKPHOS 54 55   BILITOTAL 0.5 0.4     Assessment and Plan  Logan has multiple myeloma with high risk cytogenetics. He got Miracle-RVD with VGPR, then underwent autologous transplant 11/11/21. Continue acyclovir daily for viral ppx and bactrim M/T for PJP ppx. Given his high risk cytogenetics he started miracle + revlimid maintenance therapy 2/21/22. Follow IgG and replete if it remains <300. He had severe flu-like effects from his first dose of Zometa but he has been tolerating monthly Zometa dosed at 3 mg. Continue Vit D. Continue B vitamin supplement to help his neuropathy     35 minutes spent on the date of the encounter doing chart review, review of test results, patient visit and documentation     PATTY Solis [facility]  [address] [place] [address] [phone]

## 2022-06-17 NOTE — NURSING NOTE
Chief Complaint   Patient presents with     Blood Draw     Labs drawn via piv by rn in lab. VS taken.     Labs drawn from PIV placed by RN. Line flushed with saline. Vitals taken. Pt checked in for appointment(s).    Skinny Jensen RN

## 2022-06-17 NOTE — TELEPHONE ENCOUNTER
Oral Chemotherapy Monitoring Program   Medication: Revlimid  Rx: 10mg PO daily on days 1 through 28 of 28 day cycle   Auth #: [id number]   Risk Category: Adult Male  Routine survey questions reviewed.   Rx to be Escribed to [facility]    Yu Goldberg  [facility]  Oncology Pharmacy Liaison   [email or web address]  [phone] (phone)  [phone] (fax)

## 2022-06-20 DIAGNOSIS — C90.00 MULTIPLE MYELOMA NOT HAVING ACHIEVED REMISSION (H): ICD-10-CM

## 2022-06-20 LAB
ALBUMIN SERPL ELPH-MCNC: 4.2 G/DL (ref 3.7–5.1)
ALPHA1 GLOB SERPL ELPH-MCNC: 0.3 G/DL (ref 0.2–0.4)
ALPHA2 GLOB SERPL ELPH-MCNC: 0.5 G/DL (ref 0.5–0.9)
B-GLOBULIN SERPL ELPH-MCNC: 0.8 G/DL (ref 0.6–1)
GAMMA GLOB SERPL ELPH-MCNC: 0.3 G/DL (ref 0.7–1.6)
IGA SERPL-MCNC: 37 MG/DL (ref 84–499)
IGG SERPL-MCNC: 295 MG/DL (ref 610–1616)
IGM SERPL-MCNC: 15 MG/DL (ref 35–242)
KAPPA LC FREE SER-MCNC: 0.83 MG/DL (ref 0.33–1.94)
KAPPA LC FREE/LAMBDA FREE SER NEPH: 2.24 {RATIO} (ref 0.26–1.65)
LAMBDA LC FREE SERPL-MCNC: 0.37 MG/DL (ref 0.57–2.63)
M PROTEIN SERPL ELPH-MCNC: 0.1 G/DL
PROT PATTERN SERPL ELPH-IMP: ABNORMAL
PROT PATTERN SERPL IFE-IMP: NORMAL

## 2022-06-21 DIAGNOSIS — C73 MALIGNANT NEOPLASM OF THYROID GLAND (H): ICD-10-CM

## 2022-06-24 ENCOUNTER — OFFICE VISIT (OUTPATIENT)
Dept: CARDIOLOGY | Facility: CLINIC | Age: 52
End: 2022-06-24
Payer: COMMERCIAL

## 2022-06-24 ENCOUNTER — LAB (OUTPATIENT)
Dept: LAB | Facility: CLINIC | Age: 52
End: 2022-06-24
Payer: COMMERCIAL

## 2022-06-24 ENCOUNTER — OFFICE VISIT (OUTPATIENT)
Dept: ENDOCRINOLOGY | Facility: CLINIC | Age: 52
End: 2022-06-24
Payer: COMMERCIAL

## 2022-06-24 VITALS
HEART RATE: 80 BPM | BODY MASS INDEX: 25.92 KG/M2 | SYSTOLIC BLOOD PRESSURE: 122 MMHG | WEIGHT: 160.6 LBS | DIASTOLIC BLOOD PRESSURE: 74 MMHG

## 2022-06-24 VITALS
HEART RATE: 81 BPM | DIASTOLIC BLOOD PRESSURE: 83 MMHG | BODY MASS INDEX: 25.91 KG/M2 | SYSTOLIC BLOOD PRESSURE: 122 MMHG | HEIGHT: 66 IN | WEIGHT: 161.2 LBS

## 2022-06-24 DIAGNOSIS — I25.2 HISTORY OF ACUTE INFERIOR WALL MI: ICD-10-CM

## 2022-06-24 DIAGNOSIS — I10 BENIGN ESSENTIAL HYPERTENSION: ICD-10-CM

## 2022-06-24 DIAGNOSIS — C73 PAPILLARY MICROCARCINOMA OF THYROID (H): Primary | ICD-10-CM

## 2022-06-24 DIAGNOSIS — E06.3 HYPOTHYROIDISM DUE TO HASHIMOTO'S THYROIDITIS: ICD-10-CM

## 2022-06-24 DIAGNOSIS — E78.5 HYPERLIPIDEMIA LDL GOAL <100: ICD-10-CM

## 2022-06-24 DIAGNOSIS — I25.10 CORONARY ARTERY DISEASE INVOLVING NATIVE CORONARY ARTERY OF NATIVE HEART WITHOUT ANGINA PECTORIS: ICD-10-CM

## 2022-06-24 DIAGNOSIS — Z95.5 PRESENCE OF DRUG-ELUTING STENT IN LEFT CIRCUMFLEX CORONARY ARTERY: ICD-10-CM

## 2022-06-24 DIAGNOSIS — C73 PAPILLARY MICROCARCINOMA OF THYROID (H): ICD-10-CM

## 2022-06-24 PROCEDURE — 84432 ASSAY OF THYROGLOBULIN: CPT | Mod: 90

## 2022-06-24 PROCEDURE — 36415 COLL VENOUS BLD VENIPUNCTURE: CPT

## 2022-06-24 PROCEDURE — 99000 SPECIMEN HANDLING OFFICE-LAB: CPT

## 2022-06-24 PROCEDURE — 99213 OFFICE O/P EST LOW 20 MIN: CPT | Performed by: INTERNAL MEDICINE

## 2022-06-24 PROCEDURE — 86800 THYROGLOBULIN ANTIBODY: CPT | Mod: 90

## 2022-06-24 PROCEDURE — 99215 OFFICE O/P EST HI 40 MIN: CPT | Performed by: INTERNAL MEDICINE

## 2022-06-24 RX ORDER — LEVOTHYROXINE SODIUM 137 UG/1
137 TABLET ORAL DAILY
Qty: 21 TABLET | Refills: 0 | OUTPATIENT
Start: 2022-06-24

## 2022-06-24 NOTE — PATIENT INSTRUCTIONS
-Labs today  -Continue levothyroxine 125 mcg daily  -Another set of labs in 6 weeks tor recheck thyroid function tests on new dose of levothyroxine   -Return for a follow-up visit in 6 months (virtual visit OK), with neck ultrasound before visit--[place] [facility] [place]  -We will communicate results via [employer], or if needed by phone

## 2022-06-24 NOTE — LETTER
"    6/24/2022         RE: June Ronquillo  [address] [place]  [place] [id number]        Dear Colleague,    Thank you for referring your patient, June Ronquillo, to the [facility]. Please see a copy of my visit note below.      ENDOCRINOLOGY FOLLOW-UP         HISTORY OF PRESENT ILLNESS    June Ronquillo is seen in follow-up for the issues outlined below.     1.  PTC.  The patient underwent total thyroidectomy with left selective neck dissection to include levels 6 and 7 on 5/3/2022 with Dr. Sepulveda.    Surgical pathology was as follows:  A.  THYROID GLAND, TOTAL THYROIDECTOMY:  -PAPILLARY THYROID MICROCARCINOMA, conventional/classic type, 0.6 cm in greatest dimension.  -Tumor is located in the mid left lobe, encapsulated, and limited to thyroid gland (no extrathyroidal extension identified).  -Margins are negative for tumor (closest is anterior left lobe surface at 1 mm).  -Benign adenomatoid and colloid nodules up to 0.6 cm in greatest dimension.  -Chronic lymphocytic thyroiditis.  -Biopsy site changes.  -Fragments of parathyroid tissue with normal cellularity (adjacent to mid/lower right lobe).  -One perithyroid lymph node, negative for malignancy (0/1).  -AJCC pathologic staging is pT1a N0a.  -See comment and synoptic report.     B.  LEFT LEVEL 6 AND 7 NODE, EXCISION:  -Parathyroid gland with normal cellularity.  -No lymph node identified.  -Benign fibroadipose tissue.    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      Logan has recovered well after thyroidectomy.  He finds that his voice has \"vibrated\" a few times after he has talked for extended periods of time.  No dysphagia, no stridor.    Not taking calcitriol.  Continues on a calcium supplement once daily.    No perioral numbness or tingling, has longstanding left foot numbness after initiation of chemotherapy 1 year ago.    Has noted some muscle cramping in the back " occasionally.    2. Hypothyroidism.  With Hashimoto's thyroiditis preceding thyroidectomy.  He was initiated on levothyroxine 137 mcg daily after thyroidectomy.  After TSH returned suppressed, I asked Logan to reduce levothyroxine to 125 mcg daily on 6/22/2022.    He is taking levothyroxine first thing in the morning.  Typically takes his calcium supplement in the evening.  Fair energy level.  No consistent tremors or palpitations.    Pertinent endocrine and related history:  1. Hypothyroidism. Diagnosed in 1/2021, attributed to Hashimoto's thyroiditis.    2. PTC. During imaging for evaluation of multiple myeloma, he was incidentally discovered to have increased FDG uptake in thyroid nodules in 8/2021 and in 10/2021, with findings as below:  - PET/CT 8/31/2021: Hypermetabolic cervical lymph nodes, 3 separate hypermetabolic foci in the thyroid gland (2 in the left lobe, 1 in the right lobe).  CT appearance of nodules was hypoattenuating with a bulky calcification in one of the left thyroid lobe nodules.  SUV max of thyroid lesions 4.95.  - PET/CT 10/15/2021: Previous mildly enlarged FDG avid right upper cervical lymph nodes were resolved, likely inflammatory.  Few tiny thyroid nodules, similar in size, with decreased (now at most mild) uptake (SUV max 2.5).  -He does not have history of excessive head or neck radiation exposure.  No family history of thyroid disease/thyroid cancer.  -After initial visit in 10/2021, I referred patient for thyroid US which was performed on 10/28/2021. This showed bilateral thyroid nodules and heterogenous thyroid parenchyma.  -He had FNA of left lobe nodule performed on 11/2/2021. Cytology returned consistent with papillary thyroid carcinoma.  -Detailed neck US performed 11/8/2021 showed no suspicious appearing cervical nodes.    3. Multiple myeloma. Diagnosed in 4/2021.  He was initiated on treatment with daratumumab, Velcade, Revlimid and dexamethasone.  S/p autologous PBSCT.   4. High  risk for diabetes.    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 MEDICAL HISTORY  Past Medical History:   Diagnosis Date     CAD (coronary artery disease)     s/p stent to CFX     Heart attack (H)      Hyperlipidemia LDL goal <100      Hypertension      Stented coronary artery      Thyroid disease        MEDICATIONS  Current Outpatient Medications   Medication Sig Dispense Refill     acyclovir (ZOVIRAX) 800 MG tablet Take 1 tablet (800 mg) by mouth 2 times daily 180 tablet 1     aspirin (ASA) 81 MG EC tablet Take 1 tablet (81 mg) by mouth daily       calcium carb-cholecalciferol (OS-HARDEEP) 500-200 MG-UNIT tablet Take 1 tablet by mouth daily 30 tablet 3     calcium carbonate (TUMS) 500 MG chewable tablet Take 2 tablets (1,000 mg) by mouth 3 times daily 90 tablet 1     LENalidomide (REVLIMID) 10 MG CAPS capsule Take 1 capsule (10 mg) by mouth daily 28 capsule 0     levothyroxine (SYNTHROID/LEVOTHROID) 125 MCG tablet Take 1 tablet (125 mcg) by mouth daily 90 tablet 1     losartan (COZAAR) 50 MG tablet Take 1 tablet (50 mg) by mouth daily 90 tablet 0     potassium chloride ER (KLOR-CON M) 10 MEQ CR tablet Take 2 tablets (20 mEq) by mouth in the morning. 60 tablet 3     rosuvastatin (CRESTOR) 40 MG tablet Take 1 tablet (40 mg) by mouth daily 30 tablet 3     sulfamethoxazole-trimethoprim (BACTRIM) 400-80 MG tablet Take 1 tablet by mouth 2 times daily Mondays and Tuesdays       vitamin B complex with vitamin C (STRESS TAB) tablet Take 1 tablet by mouth daily 90 tablet 3     calcitRIOL (ROCALTROL) 0.25 MCG capsule Take 1 capsule (0.25 mcg) by mouth 3 times daily (Patient not taking: Reported on 6/24/2022) 90 capsule 0       Allergies, family, and social history were reviewed and documented as needed in EHR.     REVIEW OF SYSTEMS  A focused ROS was performed, with pertinent positives and negatives as noted in the HPI.    PHYSICAL EXAM  /74 (BP Location:  Right arm, Patient Position: Sitting, Cuff Size: Adult Regular)   Pulse 80   Wt 72.8 kg (160 lb 9.6 oz)   BMI 25.92 kg/m    Body mass index is 25.92 kg/m .  Constitutional: Vital signs reviewed, as recorded above. Patient is alert, oriented and appears in no acute distress.  Eyes: PER, EOMI, no stare, lid lag, or retraction; no conjunctival injection.  Neck: Neck supple, no palpable thyroid tissue or neck masses, no tenderness on exam; thyroidectomy incision site is healed.  Lymphatic: No cervical or supraclavicular LAD.  Cardiovascular: RRR, normal S1/S2, no audible murmurs, rubs or gallops, no LE edema.  Respiratory: CTAB, without wheezes, crackles or rhonchi; normal chest wall motion and respiratory effort.  MSK: No clubbing or cyanosis; normal muscle bulk and tone.  Skin: Normal skin color, temperature, turgor and texture.  Neurological: Alert and oriented times 3. No tremor.    DATA REVIEW  Each of the following laboratory and/or imaging studies were reviewed.  Component      Latest Ref Rng & Units 5/9/2022 6/17/2022   Sodium      133 - 144 mmol/L  139   Potassium      3.4 - 5.3 mmol/L  3.5   Chloride      94 - 109 mmol/L  108   Carbon Dioxide      20 - 32 mmol/L  22   Anion Gap      3 - 14 mmol/L  9   Urea Nitrogen      7 - 30 mg/dL  17   Creatinine      0.66 - 1.25 mg/dL  0.97   Calcium      8.5 - 10.1 mg/dL  9.5   Glucose      70 - 99 mg/dL  127 (H)   Alkaline Phosphatase      40 - 150 U/L  54   AST      0 - 45 U/L  20   ALT      0 - 70 U/L  26   Protein Total      6.8 - 8.8 g/dL  6.4 (L)   Albumin      3.4 - 5.0 g/dL  4.0   Bilirubin Total      0.2 - 1.3 mg/dL  0.5   GFR Estimate      >60 mL/min/1.73m2  >90   25 OH Vit D2      ug/L <5    25 OH Vit D3      ug/L 65    25 OH Vit D total      20 - 75 ug/L <70    Phosphorus      2.5 - 4.5 mg/dL  3.6   Hemoglobin A1C      0.0 - 5.6 %  5.5   TSH      0.40 - 4.00 mU/L  0.07 (L)   T4 Free      0.76 - 1.46 ng/dL  1.36       ASSESSMENT  1.  Papillary thyroid  microcarcinoma.  Status post total thyroidectomy and left selective neck dissection.  T1a NX MX.  Review of surgical pathology indicates low risk of recurrence/persistent disease based on KRISTEN risk stratification system.  We will check thyroglobulin panel today.  Unless thyroglobulin level indicates significant persistent/residual disease, would recommend deferring radioiodine therapy given low risk status; we will treat with thyroid hormone suppression.  We discussed management of differentiated thyroid cancer, risk stratification and options for treatment based on risk status. Logan is in agreement with this plan.    2.  Hypothyroidism.  Diagnosed in 1/2021.  Likely due to Hashimoto's thyroiditis given positive antithyroglobulin antibody.  Previsit labs indicated excess thyroid hormone.  Given low risk disease, would initially aim for TSH in the 0.1-0.4 range and, after thyroglobulin level is available and we have assessed postoperative neck ultrasound, would consider liberalizing TSH goal further.  We have already adjusted levothyroxine dose 2 days ago; we will check thyroid function tests in about 6 weeks to ensure TSH is in goal range.    3.  Multiple myeloma.  Following in oncology.  Status post chemotherapy and autologous PBSCT.    4.  High risk for diabetes.  Hemoglobin A1c 6% on 1/25/2021.  Previsit hemoglobin A1c is 5.5%.  Continue periodic follow-up.    PLAN  -Labs today  -Continue levothyroxine 125 mcg daily  -Another set of labs in 6 weeks tor recheck thyroid function tests on new dose of levothyroxine   -Return for a follow-up visit in 6 months (virtual visit OK), with neck ultrasound before visit--neck ultrasound to be completed at the [facility] in [place]  -We will communicate results via [employer], or if needed by phone      Orders Placed This Encounter   Procedures     Thyroglobulin and Antibody (Sendout to [facility])       I spent a total of 41 minutes on the date of encounter reviewing medical records,  evaluating the patient, coordinating care and documenting in the EHR, as detailed above.      Deejay Elder MD   Division of Diabetes, Endocrinology and Metabolism  Department of Medicine              Again, thank you for allowing me to participate in the care of your patient.        Sincerely,        DEEJAY Elder MD

## 2022-06-24 NOTE — PROGRESS NOTES
"  ENDOCRINOLOGY FOLLOW-UP         HISTORY OF PRESENT ILLNESS    June Ronquillo is seen in follow-up for the issues outlined below.     1.  PTC.  The patient underwent total thyroidectomy with left selective neck dissection to include levels 6 and 7 on 5/3/2022 with Dr. Sepulveda.    Surgical pathology was as follows:  A.  THYROID GLAND, TOTAL THYROIDECTOMY:  -PAPILLARY THYROID MICROCARCINOMA, conventional/classic type, 0.6 cm in greatest dimension.  -Tumor is located in the mid left lobe, encapsulated, and limited to thyroid gland (no extrathyroidal extension identified).  -Margins are negative for tumor (closest is anterior left lobe surface at 1 mm).  -Benign adenomatoid and colloid nodules up to 0.6 cm in greatest dimension.  -Chronic lymphocytic thyroiditis.  -Biopsy site changes.  -Fragments of parathyroid tissue with normal cellularity (adjacent to mid/lower right lobe).  -One perithyroid lymph node, negative for malignancy (0/1).  -AJCC pathologic staging is pT1a N0a.  -See comment and synoptic report.     B.  LEFT LEVEL 6 AND 7 NODE, EXCISION:  -Parathyroid gland with normal cellularity.  -No lymph node identified.  -Benign fibroadipose tissue.    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      Logan has recovered well after thyroidectomy.  He finds that his voice has \"vibrated\" a few times after he has talked for extended periods of time.  No dysphagia, no stridor.    Not taking calcitriol.  Continues on a calcium supplement once daily.    No perioral numbness or tingling, has longstanding left foot numbness after initiation of chemotherapy 1 year ago.    Has noted some muscle cramping in the back occasionally.    2. Hypothyroidism.  With Hashimoto's thyroiditis preceding thyroidectomy.  He was initiated on levothyroxine 137 mcg daily after thyroidectomy.  After TSH returned suppressed, I asked Logan to reduce levothyroxine to 125 mcg daily on 6/22/2022.    He is taking " levothyroxine first thing in the morning.  Typically takes his calcium supplement in the evening.  Fair energy level.  No consistent tremors or palpitations.    Pertinent endocrine and related history:  1. Hypothyroidism. Diagnosed in 1/2021, attributed to Hashimoto's thyroiditis.    2. PTC. During imaging for evaluation of multiple myeloma, he was incidentally discovered to have increased FDG uptake in thyroid nodules in 8/2021 and in 10/2021, with findings as below:  - PET/CT 8/31/2021: Hypermetabolic cervical lymph nodes, 3 separate hypermetabolic foci in the thyroid gland (2 in the left lobe, 1 in the right lobe).  CT appearance of nodules was hypoattenuating with a bulky calcification in one of the left thyroid lobe nodules.  SUV max of thyroid lesions 4.95.  - PET/CT 10/15/2021: Previous mildly enlarged FDG avid right upper cervical lymph nodes were resolved, likely inflammatory.  Few tiny thyroid nodules, similar in size, with decreased (now at most mild) uptake (SUV max 2.5).  -He does not have history of excessive head or neck radiation exposure.  No family history of thyroid disease/thyroid cancer.  -After initial visit in 10/2021, I referred patient for thyroid US which was performed on 10/28/2021. This showed bilateral thyroid nodules and heterogenous thyroid parenchyma.  -He had FNA of left lobe nodule performed on 11/2/2021. Cytology returned consistent with papillary thyroid carcinoma.  -Detailed neck US performed 11/8/2021 showed no suspicious appearing cervical nodes.    3. Multiple myeloma. Diagnosed in 4/2021.  He was initiated on treatment with daratumumab, Velcade, Revlimid and dexamethasone.  S/p autologous PBSCT.   4. High risk for diabetes.    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 MEDICAL HISTORY  Past Medical History:   Diagnosis Date     CAD (coronary artery disease)     s/p stent to CFX     Heart attack (H)       Hyperlipidemia LDL goal <100      Hypertension      Stented coronary artery      Thyroid disease        MEDICATIONS  Current Outpatient Medications   Medication Sig Dispense Refill     acyclovir (ZOVIRAX) 800 MG tablet Take 1 tablet (800 mg) by mouth 2 times daily 180 tablet 1     aspirin (ASA) 81 MG EC tablet Take 1 tablet (81 mg) by mouth daily       calcium carb-cholecalciferol (OS-HARDEEP) 500-200 MG-UNIT tablet Take 1 tablet by mouth daily 30 tablet 3     calcium carbonate (TUMS) 500 MG chewable tablet Take 2 tablets (1,000 mg) by mouth 3 times daily 90 tablet 1     LENalidomide (REVLIMID) 10 MG CAPS capsule Take 1 capsule (10 mg) by mouth daily 28 capsule 0     levothyroxine (SYNTHROID/LEVOTHROID) 125 MCG tablet Take 1 tablet (125 mcg) by mouth daily 90 tablet 1     losartan (COZAAR) 50 MG tablet Take 1 tablet (50 mg) by mouth daily 90 tablet 0     potassium chloride ER (KLOR-CON M) 10 MEQ CR tablet Take 2 tablets (20 mEq) by mouth in the morning. 60 tablet 3     rosuvastatin (CRESTOR) 40 MG tablet Take 1 tablet (40 mg) by mouth daily 30 tablet 3     sulfamethoxazole-trimethoprim (BACTRIM) 400-80 MG tablet Take 1 tablet by mouth 2 times daily Mondays and Tuesdays       vitamin B complex with vitamin C (STRESS TAB) tablet Take 1 tablet by mouth daily 90 tablet 3     calcitRIOL (ROCALTROL) 0.25 MCG capsule Take 1 capsule (0.25 mcg) by mouth 3 times daily (Patient not taking: Reported on 6/24/2022) 90 capsule 0       Allergies, family, and social history were reviewed and documented as needed in EHR.     REVIEW OF SYSTEMS  A focused ROS was performed, with pertinent positives and negatives as noted in the HPI.    PHYSICAL EXAM  /74 (BP Location: Right arm, Patient Position: Sitting, Cuff Size: Adult Regular)   Pulse 80   Wt 72.8 kg (160 lb 9.6 oz)   BMI 25.92 kg/m    Body mass index is 25.92 kg/m .  Constitutional: Vital signs reviewed, as recorded above. Patient is alert, oriented and appears in no acute  distress.  Eyes: PER, EOMI, no stare, lid lag, or retraction; no conjunctival injection.  Neck: Neck supple, no palpable thyroid tissue or neck masses, no tenderness on exam; thyroidectomy incision site is healed.  Lymphatic: No cervical or supraclavicular LAD.  Cardiovascular: RRR, normal S1/S2, no audible murmurs, rubs or gallops, no LE edema.  Respiratory: CTAB, without wheezes, crackles or rhonchi; normal chest wall motion and respiratory effort.  MSK: No clubbing or cyanosis; normal muscle bulk and tone.  Skin: Normal skin color, temperature, turgor and texture.  Neurological: Alert and oriented times 3. No tremor.    DATA REVIEW  Each of the following laboratory and/or imaging studies were reviewed.  Component      Latest Ref Rng & Units 5/9/2022 6/17/2022   Sodium      133 - 144 mmol/L  139   Potassium      3.4 - 5.3 mmol/L  3.5   Chloride      94 - 109 mmol/L  108   Carbon Dioxide      20 - 32 mmol/L  22   Anion Gap      3 - 14 mmol/L  9   Urea Nitrogen      7 - 30 mg/dL  17   Creatinine      0.66 - 1.25 mg/dL  0.97   Calcium      8.5 - 10.1 mg/dL  9.5   Glucose      70 - 99 mg/dL  127 (H)   Alkaline Phosphatase      40 - 150 U/L  54   AST      0 - 45 U/L  20   ALT      0 - 70 U/L  26   Protein Total      6.8 - 8.8 g/dL  6.4 (L)   Albumin      3.4 - 5.0 g/dL  4.0   Bilirubin Total      0.2 - 1.3 mg/dL  0.5   GFR Estimate      >60 mL/min/1.73m2  >90   25 OH Vit D2      ug/L <5    25 OH Vit D3      ug/L 65    25 OH Vit D total      20 - 75 ug/L <70    Phosphorus      2.5 - 4.5 mg/dL  3.6   Hemoglobin A1C      0.0 - 5.6 %  5.5   TSH      0.40 - 4.00 mU/L  0.07 (L)   T4 Free      0.76 - 1.46 ng/dL  1.36       ASSESSMENT  1.  Papillary thyroid microcarcinoma.  Status post total thyroidectomy and left selective neck dissection.  T1a NX MX.  Review of surgical pathology indicates low risk of recurrence/persistent disease based on KRISTEN risk stratification system.  We will check thyroglobulin panel today.  Unless  thyroglobulin level indicates significant persistent/residual disease, would recommend deferring radioiodine therapy given low risk status; we will treat with thyroid hormone suppression.  We discussed management of differentiated thyroid cancer, risk stratification and options for treatment based on risk status. Logan is in agreement with this plan.    2.  Hypothyroidism.  Diagnosed in 1/2021.  Likely due to Hashimoto's thyroiditis given positive antithyroglobulin antibody.  Previsit labs indicated excess thyroid hormone.  Given low risk disease, would initially aim for TSH in the 0.1-0.4 range and, after thyroglobulin level is available and we have assessed postoperative neck ultrasound, would consider liberalizing TSH goal further.  We have already adjusted levothyroxine dose 2 days ago; we will check thyroid function tests in about 6 weeks to ensure TSH is in goal range.    3.  Multiple myeloma.  Following in oncology.  Status post chemotherapy and autologous PBSCT.    4.  High risk for diabetes.  Hemoglobin A1c 6% on 1/25/2021.  Previsit hemoglobin A1c is 5.5%.  Continue periodic follow-up.    PLAN  -Labs today  -Continue levothyroxine 125 mcg daily  -Another set of labs in 6 weeks tor recheck thyroid function tests on new dose of levothyroxine   -Return for a follow-up visit in 6 months (virtual visit OK), with neck ultrasound before visit--neck ultrasound to be completed at the [facility] in [place]  -We will communicate results via [email or web address], or if needed by phone      Orders Placed This Encounter   Procedures     Thyroglobulin and Antibody (Sendout to [facility])       I spent a total of 41 minutes on the date of encounter reviewing medical records, evaluating the patient, coordinating care and documenting in the EHR, as detailed above.      Makeda Elder MD   Division of Diabetes, Endocrinology and Metabolism  Department of Medicine

## 2022-06-24 NOTE — LETTER
6/24/2022    Selma Johnson, APRN CNP  [address]  [facility] [address]    RE: June Andersonn       Dear Colleague,     I had the pleasure of seeing June Ronquillo in the [facility].  HPI and Plan:   It is a pleasure for me to see Mr. Ronquillo for followup of coronary artery disease.      In 2004, he presented to [facility] with an acute inferoposterior STEMI.  The culprit was a 90% mid-circumflex stenosis, which was successfully revascularized with placement of a drug-eluting stent.  He has normal left ventricular systolic function.    When I saw him last year he was doing well with cardiac risk factors were deteriorating little.  I advised starting him on losartan for hypertension and he had impaired glucose tolerance.    Shortly after that he was unfortunately diagnosed with multiple myeloma.  I am very happy to hear that he has responded very well to chemotherapy and at the end of last year he underwent a stem cell transplant without incident.    He now feels well.  He walks 2 to 3 miles a day and has no cardiac symptoms.  Cardiac examination is unremarkable.    On losartan his blood pressure appears to be under good control    On rosuvastatin 40 mg his last LDL was 93.  I think this is fine especially as he has been told to take high-fat high-protein diet from his oncologist.  He is under long-term surveillance and is still on chemotherapy but he is in remission.    Cardiac wise he is doing just fine.  I will continue annual follow-up.    Orders Placed This Encounter   Procedures     Follow-Up with Cardiology       No orders of the defined types were placed in this encounter.      Encounter Diagnoses   Name Primary?     Presence of drug-eluting stent in left circumflex coronary artery      History of acute inferior wall MI      Benign essential hypertension      Coronary artery disease involving native coronary artery of native heart without angina pectoris       Hyperlipidemia LDL goal <100        CURRENT MEDICATIONS:  Current Outpatient Medications   Medication Sig Dispense Refill     acyclovir (ZOVIRAX) 800 MG tablet Take 1 tablet (800 mg) by mouth 2 times daily 180 tablet 1     aspirin (ASA) 81 MG EC tablet Take 1 tablet (81 mg) by mouth daily       calcium carb-cholecalciferol (OS-HARDEEP) 500-200 MG-UNIT tablet Take 1 tablet by mouth daily 30 tablet 3     calcium carbonate (TUMS) 500 MG chewable tablet Take 2 tablets (1,000 mg) by mouth 3 times daily 90 tablet 1     LENalidomide (REVLIMID) 10 MG CAPS capsule Take 1 capsule (10 mg) by mouth daily 28 capsule 0     levothyroxine (SYNTHROID/LEVOTHROID) 125 MCG tablet Take 1 tablet (125 mcg) by mouth daily 90 tablet 1     losartan (COZAAR) 50 MG tablet Take 1 tablet (50 mg) by mouth daily 90 tablet 0     potassium chloride ER (KLOR-CON M) 10 MEQ CR tablet Take 2 tablets (20 mEq) by mouth in the morning. 60 tablet 3     rosuvastatin (CRESTOR) 40 MG tablet Take 1 tablet (40 mg) by mouth daily 30 tablet 3     sulfamethoxazole-trimethoprim (BACTRIM) 400-80 MG tablet Take 1 tablet by mouth 2 times daily Mondays and Tuesdays       vitamin B complex with vitamin C (STRESS TAB) tablet Take 1 tablet by mouth daily 90 tablet 3     calcitRIOL (ROCALTROL) 0.25 MCG capsule Take 1 capsule (0.25 mcg) by mouth 3 times daily (Patient not taking: Reported on 6/24/2022) 90 capsule 0     dexamethasone (DECADRON) 4 MG tablet Take 1 tablet (4 mg) by mouth daily (Patient not taking: Reported on 6/24/2022) 6 tablet 1     LENalidomide (REVLIMID) 10 MG CAPS capsule Take 1 capsule (10 mg) by mouth daily (Patient not taking: Reported on 6/24/2022) 28 capsule 0     LENalidomide (REVLIMID) 10 MG CAPS capsule Take 1 capsule (10 mg) by mouth daily (Patient not taking: Reported on 6/24/2022) 28 capsule 0     oxyCODONE (ROXICODONE) 5 MG tablet Take 1 tablet (5 mg) by mouth every 4 hours as needed for moderate to severe pain (Patient not taking: Reported on  6/24/2022) 10 tablet 0       ALLERGIES     Allergies   Allergen Reactions     Blood Transfusion Related (Informational Only) Other (See Comments)     Patient has a history of a clinically significant antibody against RBC antigens.  A delay in compatible RBCs may occur.        PAST MEDICAL HISTORY:  Past Medical History:   Diagnosis Date     CAD (coronary artery disease)     s/p stent to CFX     Heart attack (H)      Hyperlipidemia LDL goal <100      Hypertension      Stented coronary artery      Thyroid disease        PAST SURGICAL HISTORY:  Past Surgical History:   Procedure Laterality Date     BONE MARROW BIOPSY, BONE SPECIMEN, NEEDLE/TROCAR Left 5/17/2021    Procedure: BIOPSY, BONE MARROW with aspiration and ancillary studies;  Surgeon: Niharika Regalado MD;  Location: [facility] OR     BONE MARROW BIOPSY, BONE SPECIMEN, NEEDLE/TROCAR Left 10/14/2021    Procedure: BIOPSY, BONE MARROW;  Surgeon: Alexa Albert APRN [facility];  Location: [facility] OR     BONE MARROW BIOPSY, BONE SPECIMEN, NEEDLE/TROCAR Right 3/7/2022    Procedure: BIOPSY, BONE MARROW;  Surgeon: Deborah aCrmona PA-C;  Location: [facility] OR     BONE MARROW BIOPSY, BONE SPECIMEN, NEEDLE/TROCAR N/A 5/9/2022    Procedure: BIOPSY, BONE MARROW;  Surgeon: Deborah Leblanc PA-C;  Location: [facility] OR     HEART CATH PTCA SINGLE VESSEL  10/10/2004    Stent to X - [facility]      INSERT CATHETER VASCULAR ACCESS Right 11/3/2021    Procedure: NON VALVED TUNNELED DUAL LUMEN APHARESIS CAPABLE LINE INSERTION, VASCULAR ACCESS CATHETER;  Surgeon: Werner Mcnamara MD;  Location: [facility] OR     IR CVC TUNNEL PLACEMENT > 5 YRS OF AGE  11/3/2021     IR CVC TUNNEL REMOVAL RIGHT  12/2/2021     THYROIDECTOMY N/A 5/3/2022    Procedure: total thyroidectomy, Left selective neck dissection level 6 and level 7 ;  Surgeon: Clarita Sepulveda MD;  Location: [facility] OR       FAMILY HISTORY:  Family History   Problem Relation Age of Onset     Hypertension Mother      Hyperlipidemia  "Mother      Heart Disease Paternal Grandmother      Hyperlipidemia Sister      Hyperlipidemia Sister        SOCIAL HISTORY:  Social History     Socioeconomic History     Marital status:      Spouse name: None     Number of children: None     Years of education: None     Highest education level: None   Tobacco Use     Smoking status: Never Smoker     Smokeless tobacco: Never Used   Substance and Sexual Activity     Alcohol use: Not Currently     Drug use: No     Sexual activity: Yes     Partners: Female   Other Topics Concern     Parent/sibling w/ CABG, MI or angioplasty before 65F 55M? No     Caffeine Concern No     Special Diet No     Exercise No     Seat Belt Yes     Social Determinants of Health     Intimate Partner Violence: Not At Risk     Fear of Current or Ex-Partner: No     Emotionally Abused: No     Physically Abused: No     Sexually Abused: No       Review of Systems:  Skin:  Negative     Eyes:  Positive for contacts  ENT:  Negative    Respiratory:  Negative for    Cardiovascular:  Negative Positive for;dizziness;lightheadedness  Gastroenterology: Negative    Genitourinary:  Negative    Musculoskeletal:  Negative    Neurologic:  Positive for headaches  Psychiatric:  Negative    Heme/Lymph/Imm:  Negative for    Endocrine:  Negative      Physical Exam:  Vitals: /83   Pulse 81   Ht 1.676 m (5' 6\")   Wt 73.1 kg (161 lb 3.2 oz)   BMI 26.02 kg/m      Constitutional:  cooperative, alert and oriented, well developed, well nourished, in no acute distress        Skin:  warm and dry to the touch, no apparent skin lesions or masses noted          Head:  normocephalic, no masses or lesions        Eyes:  pupils equal and round, conjunctivae and lids unremarkable, sclera white, no xanthalasma, EOMS intact, no nystagmus        Lymph:No Cervical lymphadenopathy present     ENT:  no pallor or cyanosis, dentition good        Neck:  carotid pulses are full and equal bilaterally, JVP normal, no carotid bruit   "      Respiratory:  normal breath sounds, clear to auscultation, normal A-P diameter, normal symmetry, normal respiratory excursion, no use of accessory muscles         Cardiac: regular rhythm, normal S1/S2, no S3 or S4, apical impulse not displaced, no murmurs, gallops or rubs                pulses full and equal, no bruits auscultated                                        GI:  abdomen soft, non-tender, BS normoactive, no mass, no HSM, no bruits        Extremities and Muscular Skeletal:  no deformities, clubbing, cyanosis, erythema observed              Neurological:  no gross motor deficits        Psych:  Alert and Oriented x 3        Recent Lab Results:  LIPID RESULTS:  Lab Results   Component Value Date    CHOL 163 10/13/2021    CHOL 154 01/20/2021    HDL 43 10/13/2021    HDL 41 01/20/2021    LDL 93 10/13/2021    LDL 65 01/20/2021    TRIG 134 10/13/2021    TRIG 241 (H) 01/20/2021    CHOLHDLRATIO 4.2 06/19/2015       LIVER ENZYME RESULTS:  Lab Results   Component Value Date    AST 20 06/17/2022    AST 23 07/09/2021    ALT 26 06/17/2022    ALT 25 07/09/2021       CBC RESULTS:  Lab Results   Component Value Date    WBC 5.3 06/17/2022    WBC 9.6 07/09/2021    RBC 3.81 (L) 06/17/2022    RBC 3.62 (L) 07/09/2021    HGB 11.6 (L) 06/17/2022    HGB 10.9 (L) 07/09/2021    HCT 35.0 (L) 06/17/2022    HCT 32.1 (L) 07/09/2021    MCV 92 06/17/2022    MCV 89 07/09/2021    MCH 30.4 06/17/2022    MCH 30.1 07/09/2021    MCHC 33.1 06/17/2022    MCHC 34.0 07/09/2021    RDW 16.3 (H) 06/17/2022    RDW 17.0 (H) 07/09/2021     06/17/2022     (H) 07/09/2021       BMP RESULTS:  Lab Results   Component Value Date     06/17/2022     07/09/2021    POTASSIUM 3.5 06/17/2022    POTASSIUM 3.7 07/09/2021    CHLORIDE 108 06/17/2022    CHLORIDE 107 07/09/2021    CO2 22 06/17/2022    CO2 24 07/09/2021    ANIONGAP 9 06/17/2022    ANIONGAP 5 07/09/2021     (H) 06/17/2022    GLC 91 07/09/2021    BUN 17 06/17/2022    BUN  8 07/09/2021    CR 0.97 06/17/2022    CR 0.87 07/09/2021    GFRESTIMATED >90 06/17/2022    GFRESTIMATED >90 07/09/2021    GFRESTBLACK >90 07/09/2021    HARDEEP 9.5 06/17/2022    HARDEEP 8.4 (L) 07/09/2021        A1C RESULTS:  Lab Results   Component Value Date    A1C 5.5 06/17/2022    A1C 6.0 (H) 01/25/2021     INR RESULTS:  Lab Results   Component Value Date    INR 0.89 05/09/2022    INR 0.96 03/07/2022     CC  Graciela Craig MD  [address] [facility] [address]  [place] [address]    Thank you for allowing me to participate in the care of your patient.      Sincerely,   DR GRACIELA CRAIG MD   [facility]

## 2022-06-24 NOTE — PROGRESS NOTES
HPI and Plan:   It is a pleasure for me to see Mr. Ronquillo for followup of coronary artery disease.      In 2004, he presented to [facility] with an acute inferoposterior STEMI.  The culprit was a 90% mid-circumflex stenosis, which was successfully revascularized with placement of a drug-eluting stent.  He has normal left ventricular systolic function.    When I saw him last year he was doing well with cardiac risk factors were deteriorating little.  I advised starting him on losartan for hypertension and he had impaired glucose tolerance.    Shortly after that he was unfortunately diagnosed with multiple myeloma.  I am very happy to hear that he has responded very well to chemotherapy and at the end of last year he underwent a stem cell transplant without incident.    He now feels well.  He walks 2 to 3 miles a day and has no cardiac symptoms.  Cardiac examination is unremarkable.    On losartan his blood pressure appears to be under good control    On rosuvastatin 40 mg his last LDL was 93.  I think this is fine especially as he has been told to take high-fat high-protein diet from his oncologist.  He is under long-term surveillance and is still on chemotherapy but he is in remission.    Cardiac wise he is doing just fine.  I will continue annual follow-up.    Orders Placed This Encounter   Procedures     Follow-Up with Cardiology       No orders of the defined types were placed in this encounter.      Encounter Diagnoses   Name Primary?     Presence of drug-eluting stent in left circumflex coronary artery      History of acute inferior wall MI      Benign essential hypertension      Coronary artery disease involving native coronary artery of native heart without angina pectoris      Hyperlipidemia LDL goal <100        CURRENT MEDICATIONS:  Current Outpatient Medications   Medication Sig Dispense Refill     acyclovir (ZOVIRAX) 800 MG tablet Take 1 tablet (800 mg) by mouth 2 times daily 180 tablet 1     aspirin (ASA) 81  MG EC tablet Take 1 tablet (81 mg) by mouth daily       calcium carb-cholecalciferol (OS-HARDEEP) 500-200 MG-UNIT tablet Take 1 tablet by mouth daily 30 tablet 3     calcium carbonate (TUMS) 500 MG chewable tablet Take 2 tablets (1,000 mg) by mouth 3 times daily 90 tablet 1     LENalidomide (REVLIMID) 10 MG CAPS capsule Take 1 capsule (10 mg) by mouth daily 28 capsule 0     levothyroxine (SYNTHROID/LEVOTHROID) 125 MCG tablet Take 1 tablet (125 mcg) by mouth daily 90 tablet 1     losartan (COZAAR) 50 MG tablet Take 1 tablet (50 mg) by mouth daily 90 tablet 0     potassium chloride ER (KLOR-CON M) 10 MEQ CR tablet Take 2 tablets (20 mEq) by mouth in the morning. 60 tablet 3     rosuvastatin (CRESTOR) 40 MG tablet Take 1 tablet (40 mg) by mouth daily 30 tablet 3     sulfamethoxazole-trimethoprim (BACTRIM) 400-80 MG tablet Take 1 tablet by mouth 2 times daily Mondays and Tuesdays       vitamin B complex with vitamin C (STRESS TAB) tablet Take 1 tablet by mouth daily 90 tablet 3     calcitRIOL (ROCALTROL) 0.25 MCG capsule Take 1 capsule (0.25 mcg) by mouth 3 times daily (Patient not taking: Reported on 6/24/2022) 90 capsule 0     dexamethasone (DECADRON) 4 MG tablet Take 1 tablet (4 mg) by mouth daily (Patient not taking: Reported on 6/24/2022) 6 tablet 1     LENalidomide (REVLIMID) 10 MG CAPS capsule Take 1 capsule (10 mg) by mouth daily (Patient not taking: Reported on 6/24/2022) 28 capsule 0     LENalidomide (REVLIMID) 10 MG CAPS capsule Take 1 capsule (10 mg) by mouth daily (Patient not taking: Reported on 6/24/2022) 28 capsule 0     oxyCODONE (ROXICODONE) 5 MG tablet Take 1 tablet (5 mg) by mouth every 4 hours as needed for moderate to severe pain (Patient not taking: Reported on 6/24/2022) 10 tablet 0       ALLERGIES     Allergies   Allergen Reactions     Blood Transfusion Related (Informational Only) Other (See Comments)     Patient has a history of a clinically significant antibody against RBC antigens.  A delay in  compatible RBCs may occur.        PAST MEDICAL HISTORY:  Past Medical History:   Diagnosis Date     CAD (coronary artery disease)     s/p stent to CFX     Heart attack (H)      Hyperlipidemia LDL goal <100      Hypertension      Stented coronary artery      Thyroid disease        PAST SURGICAL HISTORY:  Past Surgical History:   Procedure Laterality Date     BONE MARROW BIOPSY, BONE SPECIMEN, NEEDLE/TROCAR Left 5/17/2021    Procedure: BIOPSY, BONE MARROW with aspiration and ancillary studies;  Surgeon: Niharika Regalado MD;  Location: [facility] OR     BONE MARROW BIOPSY, BONE SPECIMEN, NEEDLE/TROCAR Left 10/14/2021    Procedure: BIOPSY, BONE MARROW;  Surgeon: Alexa Albert APRN CNP;  Location: [facility] OR     BONE MARROW BIOPSY, BONE SPECIMEN, NEEDLE/TROCAR Right 3/7/2022    Procedure: BIOPSY, BONE MARROW;  Surgeon: Deborah Carmona PA-C;  Location: [facility] OR     BONE MARROW BIOPSY, BONE SPECIMEN, NEEDLE/TROCAR N/A 5/9/2022    Procedure: BIOPSY, BONE MARROW;  Surgeon: Deborah Leblanc PA-C;  Location: [facility] OR     HEART CATH PTCA SINGLE VESSEL  10/10/2004    Stent to X - [facility]      INSERT CATHETER VASCULAR ACCESS Right 11/3/2021    Procedure: NON VALVED TUNNELED DUAL LUMEN APHARESIS CAPABLE LINE INSERTION, VASCULAR ACCESS CATHETER;  Surgeon: Werner Mcnamara MD;  Location: [facility] OR     IR CVC TUNNEL PLACEMENT > 5 YRS OF AGE  11/3/2021     IR CVC TUNNEL REMOVAL RIGHT  12/2/2021     THYROIDECTOMY N/A 5/3/2022    Procedure: total thyroidectomy, Left selective neck dissection level 6 and level 7 ;  Surgeon: Clarita Sepulveda MD;  Location: [facility] OR       FAMILY HISTORY:  Family History   Problem Relation Age of Onset     Hypertension Mother      Hyperlipidemia Mother      Heart Disease Paternal Grandmother      Hyperlipidemia Sister      Hyperlipidemia Sister        SOCIAL HISTORY:  Social History     Socioeconomic History     Marital status:      Spouse name: None     Number of children: None  "    Years of education: None     Highest education level: None   Tobacco Use     Smoking status: Never Smoker     Smokeless tobacco: Never Used   Substance and Sexual Activity     Alcohol use: Not Currently     Drug use: No     Sexual activity: Yes     Partners: Female   Other Topics Concern     Parent/sibling w/ CABG, MI or angioplasty before 65F 55M? No     Caffeine Concern No     Special Diet No     Exercise No     Seat Belt Yes     Social Determinants of Health     Intimate Partner Violence: Not At Risk     Fear of Current or Ex-Partner: No     Emotionally Abused: No     Physically Abused: No     Sexually Abused: No       Review of Systems:  Skin:  Negative     Eyes:  Positive for contacts  ENT:  Negative    Respiratory:  Negative for    Cardiovascular:  Negative Positive for;dizziness;lightheadedness  Gastroenterology: Negative    Genitourinary:  Negative    Musculoskeletal:  Negative    Neurologic:  Positive for headaches  Psychiatric:  Negative    Heme/Lymph/Imm:  Negative for    Endocrine:  Negative      Physical Exam:  Vitals: /83   Pulse 81   Ht 1.676 m (5' 6\")   Wt 73.1 kg (161 lb 3.2 oz)   BMI 26.02 kg/m      Constitutional:  cooperative, alert and oriented, well developed, well nourished, in no acute distress        Skin:  warm and dry to the touch, no apparent skin lesions or masses noted          Head:  normocephalic, no masses or lesions        Eyes:  pupils equal and round, conjunctivae and lids unremarkable, sclera white, no xanthalasma, EOMS intact, no nystagmus        Lymph:No Cervical lymphadenopathy present     ENT:  no pallor or cyanosis, dentition good        Neck:  carotid pulses are full and equal bilaterally, JVP normal, no carotid bruit        Respiratory:  normal breath sounds, clear to auscultation, normal A-P diameter, normal symmetry, normal respiratory excursion, no use of accessory muscles         Cardiac: regular rhythm, normal S1/S2, no S3 or S4, apical impulse not " displaced, no murmurs, gallops or rubs                pulses full and equal, no bruits auscultated                                        GI:  abdomen soft, non-tender, BS normoactive, no mass, no HSM, no bruits        Extremities and Muscular Skeletal:  no deformities, clubbing, cyanosis, erythema observed              Neurological:  no gross motor deficits        Psych:  Alert and Oriented x 3        Recent Lab Results:  LIPID RESULTS:  Lab Results   Component Value Date    CHOL 163 10/13/2021    CHOL 154 01/20/2021    HDL 43 10/13/2021    HDL 41 01/20/2021    LDL 93 10/13/2021    LDL 65 01/20/2021    TRIG 134 10/13/2021    TRIG 241 (H) 01/20/2021    CHOLHDLRATIO 4.2 06/19/2015       LIVER ENZYME RESULTS:  Lab Results   Component Value Date    AST 20 06/17/2022    AST 23 07/09/2021    ALT 26 06/17/2022    ALT 25 07/09/2021       CBC RESULTS:  Lab Results   Component Value Date    WBC 5.3 06/17/2022    WBC 9.6 07/09/2021    RBC 3.81 (L) 06/17/2022    RBC 3.62 (L) 07/09/2021    HGB 11.6 (L) 06/17/2022    HGB 10.9 (L) 07/09/2021    HCT 35.0 (L) 06/17/2022    HCT 32.1 (L) 07/09/2021    MCV 92 06/17/2022    MCV 89 07/09/2021    MCH 30.4 06/17/2022    MCH 30.1 07/09/2021    MCHC 33.1 06/17/2022    MCHC 34.0 07/09/2021    RDW 16.3 (H) 06/17/2022    RDW 17.0 (H) 07/09/2021     06/17/2022     (H) 07/09/2021       BMP RESULTS:  Lab Results   Component Value Date     06/17/2022     07/09/2021    POTASSIUM 3.5 06/17/2022    POTASSIUM 3.7 07/09/2021    CHLORIDE 108 06/17/2022    CHLORIDE 107 07/09/2021    CO2 22 06/17/2022    CO2 24 07/09/2021    ANIONGAP 9 06/17/2022    ANIONGAP 5 07/09/2021     (H) 06/17/2022    GLC 91 07/09/2021    BUN 17 06/17/2022    BUN 8 07/09/2021    CR 0.97 06/17/2022    CR 0.87 07/09/2021    GFRESTIMATED >90 06/17/2022    GFRESTIMATED >90 07/09/2021    GFRESTBLACK >90 07/09/2021    HARDEEP 9.5 06/17/2022    HARDEEP 8.4 (L) 07/09/2021        A1C RESULTS:  Lab Results    Component Value Date    A1C 5.5 06/17/2022    A1C 6.0 (H) 01/25/2021       INR RESULTS:  Lab Results   Component Value Date    INR 0.89 05/09/2022    INR 0.96 03/07/2022           CC  Dominguez Craig MD  [address]  [place] [address]

## 2022-06-27 ENCOUNTER — MYC MEDICAL ADVICE (OUTPATIENT)
Dept: ONCOLOGY | Facility: CLINIC | Age: 52
End: 2022-06-27
Payer: COMMERCIAL

## 2022-06-27 DIAGNOSIS — C90.00 MULTIPLE MYELOMA NOT HAVING ACHIEVED REMISSION (H): ICD-10-CM

## 2022-06-27 LAB — THYROGLOB AB SERPL IA-ACNC: <20 IU/ML

## 2022-07-05 ENCOUNTER — APPOINTMENT (OUTPATIENT)
Dept: LAB | Facility: CLINIC | Age: 52
End: 2022-07-05
Attending: STUDENT IN AN ORGANIZED HEALTH CARE EDUCATION/TRAINING PROGRAM
Payer: COMMERCIAL

## 2022-07-05 ENCOUNTER — ALLIED HEALTH/NURSE VISIT (OUTPATIENT)
Dept: TRANSPLANT | Facility: CLINIC | Age: 52
End: 2022-07-05
Attending: STUDENT IN AN ORGANIZED HEALTH CARE EDUCATION/TRAINING PROGRAM
Payer: COMMERCIAL

## 2022-07-05 VITALS
WEIGHT: 162.3 LBS | DIASTOLIC BLOOD PRESSURE: 78 MMHG | HEART RATE: 71 BPM | SYSTOLIC BLOOD PRESSURE: 120 MMHG | OXYGEN SATURATION: 99 % | BODY MASS INDEX: 26.2 KG/M2 | TEMPERATURE: 98.6 F | RESPIRATION RATE: 18 BRPM

## 2022-07-05 DIAGNOSIS — Z52.011 AUTOLOGOUS DONOR, STEM CELLS: ICD-10-CM

## 2022-07-05 DIAGNOSIS — C90.00 MULTIPLE MYELOMA NOT HAVING ACHIEVED REMISSION (H): Primary | ICD-10-CM

## 2022-07-05 LAB
ALBUMIN SERPL-MCNC: 3.6 G/DL (ref 3.4–5)
ALP SERPL-CCNC: 52 U/L (ref 40–150)
ALT SERPL W P-5'-P-CCNC: 22 U/L (ref 0–70)
ANION GAP SERPL CALCULATED.3IONS-SCNC: 13 MMOL/L (ref 3–14)
AST SERPL W P-5'-P-CCNC: 22 U/L (ref 0–45)
BASOPHILS # BLD AUTO: 0 10E3/UL (ref 0–0.2)
BASOPHILS NFR BLD AUTO: 1 %
BILIRUB SERPL-MCNC: 0.4 MG/DL (ref 0.2–1.3)
BUN SERPL-MCNC: 11 MG/DL (ref 7–30)
CALCIUM SERPL-MCNC: 8.5 MG/DL (ref 8.5–10.1)
CHLORIDE BLD-SCNC: 109 MMOL/L (ref 94–109)
CO2 SERPL-SCNC: 20 MMOL/L (ref 20–32)
CREAT SERPL-MCNC: 1.09 MG/DL (ref 0.66–1.25)
EOSINOPHIL # BLD AUTO: 0.6 10E3/UL (ref 0–0.7)
EOSINOPHIL NFR BLD AUTO: 9 %
ERYTHROCYTE [DISTWIDTH] IN BLOOD BY AUTOMATED COUNT: 16 % (ref 10–15)
GFR SERPL CREATININE-BSD FRML MDRD: 81 ML/MIN/1.73M2
GLUCOSE BLD-MCNC: 124 MG/DL (ref 70–99)
HCT VFR BLD AUTO: 35.3 % (ref 40–53)
HGB BLD-MCNC: 11.4 G/DL (ref 13.3–17.7)
IMM GRANULOCYTES # BLD: 0 10E3/UL
IMM GRANULOCYTES NFR BLD: 0 %
LYMPHOCYTES # BLD AUTO: 2 10E3/UL (ref 0.8–5.3)
LYMPHOCYTES NFR BLD AUTO: 32 %
MCH RBC QN AUTO: 30 PG (ref 26.5–33)
MCHC RBC AUTO-ENTMCNC: 32.3 G/DL (ref 31.5–36.5)
MCV RBC AUTO: 93 FL (ref 78–100)
MONOCYTES # BLD AUTO: 0.7 10E3/UL (ref 0–1.3)
MONOCYTES NFR BLD AUTO: 11 %
NEUTROPHILS # BLD AUTO: 2.9 10E3/UL (ref 1.6–8.3)
NEUTROPHILS NFR BLD AUTO: 47 %
NRBC # BLD AUTO: 0 10E3/UL
NRBC BLD AUTO-RTO: 0 /100
PLATELET # BLD AUTO: 349 10E3/UL (ref 150–450)
POTASSIUM BLD-SCNC: 3.8 MMOL/L (ref 3.4–5.3)
PROT SERPL-MCNC: 6.2 G/DL (ref 6.8–8.8)
RBC # BLD AUTO: 3.8 10E6/UL (ref 4.4–5.9)
SODIUM SERPL-SCNC: 142 MMOL/L (ref 133–144)
WBC # BLD AUTO: 6.2 10E3/UL (ref 4–11)

## 2022-07-05 PROCEDURE — 91305 HC RX IP 250 OP 636: CPT | Performed by: INTERNAL MEDICINE

## 2022-07-05 PROCEDURE — 0053A HC ADMIN COVID VAC PFIZER TRS-SUCR, 3RD DOSE: CPT | Performed by: INTERNAL MEDICINE

## 2022-07-05 PROCEDURE — 250N000011 HC RX IP 250 OP 636: Performed by: INTERNAL MEDICINE

## 2022-07-05 PROCEDURE — 36415 COLL VENOUS BLD VENIPUNCTURE: CPT

## 2022-07-05 PROCEDURE — 85025 COMPLETE CBC W/AUTO DIFF WBC: CPT

## 2022-07-05 PROCEDURE — 80053 COMPREHEN METABOLIC PANEL: CPT

## 2022-07-05 RX ORDER — HEPARIN SODIUM (PORCINE) LOCK FLUSH IV SOLN 100 UNIT/ML 100 UNIT/ML
5 SOLUTION INTRAVENOUS
Status: CANCELLED | OUTPATIENT
Start: 2022-07-05

## 2022-07-05 RX ORDER — HEPARIN SODIUM,PORCINE 10 UNIT/ML
5 VIAL (ML) INTRAVENOUS
Status: CANCELLED | OUTPATIENT
Start: 2022-07-05

## 2022-07-05 RX ADMIN — BNT162B2 30 MCG: 0.23 INJECTION, SUSPENSION INTRAMUSCULAR at 10:01

## 2022-07-05 ASSESSMENT — PAIN SCALES - GENERAL: PAINLEVEL: NO PAIN (0)

## 2022-07-05 NOTE — NURSING NOTE
Pfizer Covid vaccine given in right deltoid in clinic, pt tolerated. VIS provided, pt declined. See MAR for details.    Kathy Xavier, NIMESH on 7/5/2022 at 10:49 AM

## 2022-07-05 NOTE — NURSING NOTE
Chief Complaint   Patient presents with     Labs Only     Labs drawn via vpt by RN in lab, vitals completed.     Pt was checked in for next appt.    Mary Coyle RN

## 2022-07-07 LAB — SCANNED LAB RESULT: NORMAL

## 2022-07-08 ENCOUNTER — MYC MEDICAL ADVICE (OUTPATIENT)
Dept: ENDOCRINOLOGY | Facility: CLINIC | Age: 52
End: 2022-07-08

## 2022-07-08 DIAGNOSIS — E06.3 HYPOTHYROIDISM DUE TO HASHIMOTO'S THYROIDITIS: ICD-10-CM

## 2022-07-08 DIAGNOSIS — R53.83 FATIGUE, UNSPECIFIED TYPE: ICD-10-CM

## 2022-07-08 DIAGNOSIS — C73 PAPILLARY MICROCARCINOMA OF THYROID (H): Primary | ICD-10-CM

## 2022-07-08 NOTE — TELEPHONE ENCOUNTER
Per notes on 6/24/22:    We have already adjusted levothyroxine dose 2 days ago; we will check thyroid function tests in about 6 weeks to ensure TSH is in goal range.

## 2022-07-11 ENCOUNTER — TELEPHONE (OUTPATIENT)
Dept: ONCOLOGY | Facility: CLINIC | Age: 52
End: 2022-07-11

## 2022-07-11 DIAGNOSIS — C90.00 MULTIPLE MYELOMA NOT HAVING ACHIEVED REMISSION (H): Primary | ICD-10-CM

## 2022-07-11 DIAGNOSIS — I10 HYPERTENSION, UNSPECIFIED TYPE: ICD-10-CM

## 2022-07-11 DIAGNOSIS — R42 DIZZINESS: ICD-10-CM

## 2022-07-11 DIAGNOSIS — R42 DIZZINESS: Primary | ICD-10-CM

## 2022-07-11 RX ORDER — MECLIZINE HYDROCHLORIDE 25 MG/1
25 TABLET ORAL 3 TIMES DAILY PRN
Qty: 30 TABLET | Refills: 0 | Status: ON HOLD | OUTPATIENT
Start: 2022-07-11 | End: 2023-04-20

## 2022-07-11 NOTE — TELEPHONE ENCOUNTER
Started on Wednesday 7/6/22 last week     Had episodes of nausea or diarrhea.     Diarrhea  Stopped taking imodium today as he is on the verge of constipation.   Having one stool per day, watery stool.   Also having stomach cramps, come and go rates the pain a 4/5 out of 10. Stomach cramps 3-4 times daily   Everything has pretty much subsided except for dizziness.   Nausea is manageable, currently not taking any meds for nausea.   Dizziness feels dizzy all the time, Saturday was worse.   Dizziness is when turns head or getting up to standing position or turning pages of book he gets dizzy.   Taking in 10 glasses of fluids -12 ounce cups.   Hgb 11.4   Had Covid booster last week on Tuesday.    2:11 Message sent to Laura Bustamante     3:16 Per Laura Bustamante    Dizziness with head turning without a position change is more consistent with an inner ear disturbance vs a dehydration/hypotension issue, although I wouldn't totally rule that out after a week of diarrhea and nausea. Please ask him to focus on drinking 60-80 oz daily for the rest of this week. I'll prescribe a medication that should help with the dizziness called meclizine.  She will send prescription to [facility]     Also wondering about his ability to take home BP?     Call placed to [facility] and relayed above information he states his BP has been for the past 2 days has been in normal range.     160/72   178/78   I asked him to keep taking BP daily to make sure it is not low.     He will  the new medication and see if that helps his dizziness.     3:24 Per Laura Bustamante would like to see him at 10 am   9:30 lab appointment     3:24 call placed to [facility] and relayed above he said he can come in for the visit     Message sent to scheduling to schedule 9:30 labs and 10am appt with Laura Bustamante

## 2022-07-12 ENCOUNTER — APPOINTMENT (OUTPATIENT)
Dept: LAB | Facility: CLINIC | Age: 52
End: 2022-07-12
Attending: INTERNAL MEDICINE
Payer: COMMERCIAL

## 2022-07-12 ENCOUNTER — ONCOLOGY VISIT (OUTPATIENT)
Dept: ONCOLOGY | Facility: CLINIC | Age: 52
End: 2022-07-12
Attending: INTERNAL MEDICINE
Payer: COMMERCIAL

## 2022-07-12 VITALS
WEIGHT: 159.3 LBS | HEART RATE: 69 BPM | OXYGEN SATURATION: 100 % | DIASTOLIC BLOOD PRESSURE: 73 MMHG | BODY MASS INDEX: 25.71 KG/M2 | SYSTOLIC BLOOD PRESSURE: 120 MMHG | TEMPERATURE: 98.2 F | RESPIRATION RATE: 16 BRPM

## 2022-07-12 DIAGNOSIS — C90.00 MULTIPLE MYELOMA NOT HAVING ACHIEVED REMISSION (H): Primary | ICD-10-CM

## 2022-07-12 DIAGNOSIS — Z00.6 RESEARCH STUDY PATIENT: Primary | ICD-10-CM

## 2022-07-12 DIAGNOSIS — H65.90 SEROUS OTITIS MEDIA, UNSPECIFIED CHRONICITY, UNSPECIFIED LATERALITY: ICD-10-CM

## 2022-07-12 LAB
ALBUMIN SERPL-MCNC: 3.5 G/DL (ref 3.4–5)
ALP SERPL-CCNC: 53 U/L (ref 40–150)
ALT SERPL W P-5'-P-CCNC: 18 U/L (ref 0–70)
ANION GAP SERPL CALCULATED.3IONS-SCNC: <1 MMOL/L (ref 3–14)
AST SERPL W P-5'-P-CCNC: 18 U/L (ref 0–45)
BASOPHILS # BLD MANUAL: 0.1 10E3/UL (ref 0–0.2)
BASOPHILS NFR BLD MANUAL: 2 %
BILIRUB SERPL-MCNC: 0.3 MG/DL (ref 0.2–1.3)
BUN SERPL-MCNC: 13 MG/DL (ref 7–30)
CALCIUM SERPL-MCNC: 8.3 MG/DL (ref 8.5–10.1)
CHLORIDE BLD-SCNC: 114 MMOL/L (ref 94–109)
CO2 SERPL-SCNC: 20 MMOL/L (ref 20–32)
CREAT SERPL-MCNC: 1.06 MG/DL (ref 0.66–1.25)
ELLIPTOCYTES BLD QL SMEAR: SLIGHT
EOSINOPHIL # BLD MANUAL: 0.5 10E3/UL (ref 0–0.7)
EOSINOPHIL NFR BLD MANUAL: 10 %
ERYTHROCYTE [DISTWIDTH] IN BLOOD BY AUTOMATED COUNT: 15.4 % (ref 10–15)
GFR SERPL CREATININE-BSD FRML MDRD: 83 ML/MIN/1.73M2
GLUCOSE BLD-MCNC: 116 MG/DL (ref 70–99)
HCT VFR BLD AUTO: 33.8 % (ref 40–53)
HGB BLD-MCNC: 11.2 G/DL (ref 13.3–17.7)
LYMPHOCYTES # BLD MANUAL: 1.4 10E3/UL (ref 0.8–5.3)
LYMPHOCYTES NFR BLD MANUAL: 25 %
MCH RBC QN AUTO: 30.1 PG (ref 26.5–33)
MCHC RBC AUTO-ENTMCNC: 33.1 G/DL (ref 31.5–36.5)
MCV RBC AUTO: 91 FL (ref 78–100)
MONOCYTES # BLD MANUAL: 0.4 10E3/UL (ref 0–1.3)
MONOCYTES NFR BLD MANUAL: 8 %
NEUTROPHILS # BLD MANUAL: 3 10E3/UL (ref 1.6–8.3)
NEUTROPHILS NFR BLD MANUAL: 55 %
PLAT MORPH BLD: ABNORMAL
PLATELET # BLD AUTO: 330 10E3/UL (ref 150–450)
POTASSIUM BLD-SCNC: 3.5 MMOL/L (ref 3.4–5.3)
PROT SERPL-MCNC: 6 G/DL (ref 6.8–8.8)
RBC # BLD AUTO: 3.72 10E6/UL (ref 4.4–5.9)
RBC MORPH BLD: ABNORMAL
SODIUM SERPL-SCNC: 134 MMOL/L (ref 133–144)
TOTAL PROTEIN SERUM FOR ELP: 5.6 G/DL (ref 6.4–8.3)
WBC # BLD AUTO: 5.4 10E3/UL (ref 4–11)

## 2022-07-12 PROCEDURE — 36415 COLL VENOUS BLD VENIPUNCTURE: CPT | Performed by: REGISTERED NURSE

## 2022-07-12 PROCEDURE — G0463 HOSPITAL OUTPT CLINIC VISIT: HCPCS

## 2022-07-12 PROCEDURE — 86334 IMMUNOFIX E-PHORESIS SERUM: CPT | Mod: 26

## 2022-07-12 PROCEDURE — 84155 ASSAY OF PROTEIN SERUM: CPT | Performed by: REGISTERED NURSE

## 2022-07-12 PROCEDURE — 85027 COMPLETE CBC AUTOMATED: CPT | Performed by: REGISTERED NURSE

## 2022-07-12 PROCEDURE — 86334 IMMUNOFIX E-PHORESIS SERUM: CPT | Performed by: PATHOLOGY

## 2022-07-12 PROCEDURE — 84165 PROTEIN E-PHORESIS SERUM: CPT | Mod: TC | Performed by: PATHOLOGY

## 2022-07-12 PROCEDURE — 83521 IG LIGHT CHAINS FREE EACH: CPT | Mod: 59 | Performed by: REGISTERED NURSE

## 2022-07-12 PROCEDURE — 82784 ASSAY IGA/IGD/IGG/IGM EACH: CPT | Performed by: REGISTERED NURSE

## 2022-07-12 PROCEDURE — 84165 PROTEIN E-PHORESIS SERUM: CPT | Mod: 26 | Performed by: PATHOLOGY

## 2022-07-12 PROCEDURE — 99215 OFFICE O/P EST HI 40 MIN: CPT | Performed by: REGISTERED NURSE

## 2022-07-12 PROCEDURE — 80053 COMPREHEN METABOLIC PANEL: CPT | Performed by: REGISTERED NURSE

## 2022-07-12 PROCEDURE — 85007 BL SMEAR W/DIFF WBC COUNT: CPT | Performed by: REGISTERED NURSE

## 2022-07-12 RX ORDER — FLUTICASONE PROPIONATE 50 MCG
1 SPRAY, SUSPENSION (ML) NASAL DAILY
Qty: 9.9 ML | Refills: 0 | Status: ON HOLD | OUTPATIENT
Start: 2022-07-12 | End: 2023-04-20

## 2022-07-12 RX ORDER — PSEUDOEPHEDRINE HCL 30 MG
30 TABLET ORAL 2 TIMES DAILY
Qty: 6 TABLET | Refills: 0 | Status: SHIPPED | OUTPATIENT
Start: 2022-07-12 | End: 2022-07-15

## 2022-07-12 ASSESSMENT — PAIN SCALES - GENERAL: PAINLEVEL: NO PAIN (0)

## 2022-07-12 NOTE — PROGRESS NOTES
Oncologic Hx:   - 4/30/2021 M spike of 34.8 in urine.M spike in blood 0.2; IgG 702 with depressed IgA and IgM. Beta 2 microglobulin 2.7. Lambda  with K/L ratio of 0.01. WBC 11.1 with absolute eos of 1.0. hgb, plt, Cr (1.1), and albumin WNL.    -4/30/2021 CT abd/pelvis showed sclerotic marrow changes with numerous lytic appearing lesions throughout the imaged portions of the spine, pelvis and ribs.      -PET scan 5/11/2021 Numerous osteolytic lesions which predominantly demonstrate uptake below liver background levels. There is one intraosseous lesion in the left lateral 9th rib that demonstrates uptake above background, possibly due to nondisplaced fracture. Adjacent to this lesion is mild  hypermetabolism to the left pleura, with new small left pleural effusion, suspicious for malignant effusion versus posttraumatic effusion. Pleural uptake may represent extramedullary myeloma extending along the intercostal space versus inflammation.    - BM bx 5/17/2021 with flow showing 4.0% plasma cells which express CD19 (dim), CD38 (bright), CD45 (dim), , and monotypic cytoplasmic lambda immunoglobulin light chains but lack CD20 and CD56.  Hypercellular bone marrow for age (approximately 75% cellularity) with adequate trilineage hematopoiesis. Plasma cell infiltrate: Interstitial, lambda monotypic and representing approximately 60% the bone marrow cellularity, by  immunohistochemical stain. Cytpgenetics with 2 related hypodiploid clones. One with loss of Y, monosomy 13 and 14 (5%) and one with translocation of 8p and 14, derivative 16 with long arm replaced by extra copy 1q,monosomy 21. FISH showed gain of 1q, loss of 13 and 14, IGH-MYC fusion t(8:14)    - Started Miracle-RVd 21 day with velcade on days 1,8,15.     -Cycle 2 Miracle-RVd. Dose reduce dex to 80 mg d/t fatigue and stomach upset on dex days. Also having cough with basilar streaking on CXR    - 8/31/2021 BM bx -rare suspicious plasma cells in touch  "imprint. No increase in plasma cells by morph.    -8/31/2021 PET/CT Diffuse osteolytic lesions throughout the axial and appendicular skeleton. Increased sclerosis since prior exam 5/11/2021 without increased metabolism or size.  Hypermetabolic pretracheal lymph node as well as hypermetabolic level 2 lymph nodes within the right neck. These lymph nodes are likely reactive from autoimmune activation via medical therapy. Hypermetabolic subcentimeter nodules within the thyroid gland    - 11/11/2021 Autologous BMT    - 2/21/22 started maintenance with Revlimid 10 mg daily and daratumumab monthly; did Revlimid alone for C1 due to low IgG levels, added daratumumab starting C2    Interval Hx:   Logan received his COVID booster last Tuesday.  On Wednesday night, he deeloped diarrhea and nausea..  Wed and Thurs he wasn't eating or drinking well.  Friday and Saturday were particularly bad with up to 4-6 episodes of watery diarrhea those days with associated abdominal cramping.  He was also feeling very lethargic. He started pushing fluids and taking Imodium which led to some improvement in his diarrhea. He has been drinking over 100 oz of fluid daily since Friday. He has also been drinking a home remedy of water, salt, sugar, and lemon.  Notably, his blood pressures at home were 160/72 and 178/78 this weekend.  BP is better in clinic today - 120/73.    He developed dizziness Friday/Saturday which has persisted through today. Happening multiple times each hour, episodes are brief, lasting maybe 30 seconds.  He is having a pre-syncopal feeling about 50% of the time, but also reports a spinning dizziness if he turns his head or while turning pages in his book.Meclizine was prescribed yesterday but he has not picked up the prescription yet.     His thyroid medication was decreased recently (2-3 weeks ago). He also notes that he has had a \"funny feeling\" in his left ear over the past year, no pain or dizziness associated with the " feeling.    Denies fevers, chills, night sweats, unexplained weight changes, headaches, vision or hearing changes, new lumps or bumps, chest pain, shortness of breath, cough,  vomiting, changes to swelling of extremities, bleeding issues, or rash.      A comprehensive review of systems was completed and negative except as described above.       Physical Exam:  /73 (BP Location: Right arm, Patient Position: Sitting, Cuff Size: Adult Regular)   Pulse 69   Temp 98.2  F (36.8  C) (Oral)   Resp 16   Wt 72.3 kg (159 lb 4.8 oz)   SpO2 100%   BMI 25.71 kg/m    General: No acute distress  HEENT: Left TM bulging , pearly grey with clear fluid; right TM pearly grey with identified landmarks and light reflex; Sclera anicteric. Oral mucosa pink and moist.  No mucositis or thrush  Lymph: No lymphadenopathy in neck  Heart: Regular, rate, and rhythm  Lungs: Expiratory wheezes in right upper lobe.  Abdomen: Positive bowel sounds. Soft, non-distended, non-tender. No organomegaly or mass.   Extremities: no lower extremity edema  Neuro: Cranial nerves grossly intact      Labs:  I personally reviewed the following labs:    Most Recent 3 CBC's:  Recent Labs   Lab Test 07/12/22  1003 07/05/22  0922 06/17/22  1332   WBC 5.4 6.2 5.3   HGB 11.2* 11.4* 11.6*   MCV 91 93 92    349 320     Most Recent 3 BMP's:  Recent Labs   Lab Test 07/12/22  1003 07/05/22  0922 06/17/22  1332    142 139   POTASSIUM 3.5 3.8 3.5   CHLORIDE 114* 109 108   CO2 20 20 22   BUN 13 11 17   CR 1.06 1.09 0.97   ANIONGAP <1* 13 9   HARDEEP 8.3* 8.5 9.5   * 124* 127*     Most Recent 2 LFT's:  Recent Labs   Lab Test 07/12/22  1003 07/05/22  0922   AST 18 22   ALT 18 22   ALKPHOS 53 52   BILITOTAL 0.3 0.4       Assessment and Plan  Logan has multiple myeloma with high risk cytogenetics. He got Miracle-RVD with VGPR, then underwent autologous transplant 11/11/21. Continue acyclovir daily for viral ppx and bactrim M/T for PJP ppx. Given his high risk  "cytogenetics he started vidya + revlimid maintenance therapy 2/21/22. Follow IgG and replete if it remains <300. He had severe flu-like effects from his first dose of Zometa but he has been tolerating monthly Zometa dosed at 3 mg. Continue Vit D. Continue B vitamin supplement to help his neuropathy     Serous otitis media  He has serous otitis media in his left year, which could explain his dizziness. This could have developed in response to his COVID vaccination, or it could be a coincidental timing. Will trial nasal steroids (Flonase) and a decongestant (Sudafed) for the next 3 days to try to assist with eustachian tube drainage. I have also prescribed Meclizine to help with his dizziness symptoms.  I will see him back in clinic to reassess prior to his next vidya infusion.    Dizziness  Likely secondary to serous otitis media, although he may have also had a component of dehydration involved due to his recent diarrhea and the mixed picture of vertigo and pre-syncope. Creatinine function is within normal limits, and although he reports a few days of poor intake, he is currently taking in over 100 oz orally daily. Encouraged him to continue with this excellent oral intake to help combat any lingering hypervolemia. I prescribed Meclizine to help with the vertigo symptoms. If not improved by Friday, may need to consider PT or ENT referral.    Hypertension  He reported two days of hypertension over the weekend, with SBP as high as 178.  BP is WNL in clinic today.  He reports drinking \"a lot\" of a home concoction of water, sugar, salt, and lemon; increased salt content could explain higher blood pressures over the weekend.  No intervention needed based on today's BP; will continue to monitor at future appointments.      42 minutes spent on the date of the encounter doing chart review, review of test results, patient visit and documentation     PATTY Solis [facility]  [address] " [place] [address]  [phone]

## 2022-07-12 NOTE — NURSING NOTE
"Oncology Rooming Note    July 12, 2022 10:16 AM   June Ronquillo is a 52 year old male who presents for:    Chief Complaint   Patient presents with     Blood Draw     Labs drawn with  by rn. VS taken.     Oncology Clinic Visit     MM     Initial Vitals: /73 (BP Location: Right arm, Patient Position: Sitting, Cuff Size: Adult Regular)   Pulse 69   Temp 98.2  F (36.8  C) (Oral)   Resp 16   Wt 72.3 kg (159 lb 4.8 oz)   SpO2 100%   BMI 25.71 kg/m   Estimated body mass index is 25.71 kg/m  as calculated from the following:    Height as of 6/24/22: 1.676 m (5' 6\").    Weight as of this encounter: 72.3 kg (159 lb 4.8 oz). Body surface area is 1.83 meters squared.  No Pain (0) Comment: Data Unavailable   No LMP for male patient.  Allergies reviewed: Yes  Medications reviewed: Yes    Medications: Medication refills not needed today.  Pharmacy name entered into [facility]:    [facility] [place], [place] - [address]  [facility] - [place], [place] - [address]  [facility] [place], [place] - [phone]    Clinical concerns: none       Ana Cristina Cisneros"

## 2022-07-12 NOTE — LETTER
7/12/2022         RE: June Ronquillo  [address]  [place] [address]        Dear Colleague,    Thank you for referring your patient, June Ronquillo, to the [facility]. Please see a copy of my visit note below.    Oncologic Hx:   - 4/30/2021 M spike of 34.8 in urine.M spike in blood 0.2; IgG 702 with depressed IgA and IgM. Beta 2 microglobulin 2.7. Lambda  with K/L ratio of 0.01. WBC 11.1 with absolute eos of 1.0. hgb, plt, Cr (1.1), and albumin WNL.    -4/30/2021 CT abd/pelvis showed sclerotic marrow changes with numerous lytic appearing lesions throughout the imaged portions of the spine, pelvis and ribs.      -PET scan 5/11/2021 Numerous osteolytic lesions which predominantly demonstrate uptake below liver background levels. There is one intraosseous lesion in the left lateral 9th rib that demonstrates uptake above background, possibly due to nondisplaced fracture. Adjacent to this lesion is mild  hypermetabolism to the left pleura, with new small left pleural effusion, suspicious for malignant effusion versus posttraumatic effusion. Pleural uptake may represent extramedullary myeloma extending along the intercostal space versus inflammation.    - BM bx 5/17/2021 with flow showing 4.0% plasma cells which express CD19 (dim), CD38 (bright), CD45 (dim), , and monotypic cytoplasmic lambda immunoglobulin light chains but lack CD20 and CD56.  Hypercellular bone marrow for age (approximately 75% cellularity) with adequate trilineage hematopoiesis. Plasma cell infiltrate: Interstitial, lambda monotypic and representing approximately 60% the bone marrow cellularity, by  immunohistochemical stain. Cytpgenetics with 2 related hypodiploid clones. One with loss of Y, monosomy 13 and 14 (5%) and one with translocation of 8p and 14, derivative 16 with long arm replaced by extra copy 1q,monosomy 21. FISH showed gain of 1q, loss of 13 and 14, IGH-MYC fusion  t(8:14)    - Started Miracle-RVd 21 day with velcade on days 1,8,15.     -Cycle 2 Miracle-RVd. Dose reduce dex to 80 mg d/t fatigue and stomach upset on dex days. Also having cough with basilar streaking on CXR    - 8/31/2021 BM bx -rare suspicious plasma cells in touch imprint. No increase in plasma cells by morph.    -8/31/2021 PET/CT Diffuse osteolytic lesions throughout the axial and appendicular skeleton. Increased sclerosis since prior exam 5/11/2021 without increased metabolism or size.  Hypermetabolic pretracheal lymph node as well as hypermetabolic level 2 lymph nodes within the right neck. These lymph nodes are likely reactive from autoimmune activation via medical therapy. Hypermetabolic subcentimeter nodules within the thyroid gland    - 11/11/2021 Autologous BMT    - 2/21/22 started maintenance with Revlimid 10 mg daily and daratumumab monthly; did Revlimid alone for C1 due to low IgG levels, added daratumumab starting C2    Interval Hx:   Logan received his COVID booster last Tuesday.  On Wednesday night, he deeloped diarrhea and nausea..  Wed and Thurs he wasn't eating or drinking well.  Friday and Saturday were particularly bad with up to 4-6 episodes of watery diarrhea those days with associated abdominal cramping.  He was also feeling very lethargic. He started pushing fluids and taking Imodium which led to some improvement in his diarrhea. He has been drinking over 100 oz of fluid daily since Friday. He has also been drinking a home remedy of water, salt, sugar, and lemon.  Notably, his blood pressures at home were 160/72 and 178/78 this weekend.  BP is better in clinic today - 120/73.    He developed dizziness Friday/Saturday which has persisted through today. Happening multiple times each hour, episodes are brief, lasting maybe 30 seconds.  He is having a pre-syncopal feeling about 50% of the time, but also reports a spinning dizziness if he turns his head or while turning pages in his book.Meclizine  "was prescribed yesterday but he has not picked up the prescription yet.     His thyroid medication was decreased recently (2-3 weeks ago). He also notes that he has had a \"funny feeling\" in his left ear over the past year, no pain or dizziness associated with the feeling.    Denies fevers, chills, night sweats, unexplained weight changes, headaches, vision or hearing changes, new lumps or bumps, chest pain, shortness of breath, cough,  vomiting, changes to swelling of extremities, bleeding issues, or rash.      A comprehensive review of systems was completed and negative except as described above.       Physical Exam:  /73 (BP Location: Right arm, Patient Position: Sitting, Cuff Size: Adult Regular)   Pulse 69   Temp 98.2  F (36.8  C) (Oral)   Resp 16   Wt 72.3 kg (159 lb 4.8 oz)   SpO2 100%   BMI 25.71 kg/m    General: No acute distress  HEENT: Left TM bulging , pearly grey with clear fluid; right TM pearly grey with identified landmarks and light reflex; Sclera anicteric. Oral mucosa pink and moist.  No mucositis or thrush  Lymph: No lymphadenopathy in neck  Heart: Regular, rate, and rhythm  Lungs: Expiratory wheezes in right upper lobe.  Abdomen: Positive bowel sounds. Soft, non-distended, non-tender. No organomegaly or mass.   Extremities: no lower extremity edema  Neuro: Cranial nerves grossly intact      Labs:  I personally reviewed the following labs:    Most Recent 3 CBC's:  Recent Labs   Lab Test 07/12/22  1003 07/05/22 0922 06/17/22  1332   WBC 5.4 6.2 5.3   HGB 11.2* 11.4* 11.6*   MCV 91 93 92    349 320     Most Recent 3 BMP's:  Recent Labs   Lab Test 07/12/22  1003 07/05/22  0922 06/17/22  1332    142 139   POTASSIUM 3.5 3.8 3.5   CHLORIDE 114* 109 108   CO2 20 20 22   BUN 13 11 17   CR 1.06 1.09 0.97   ANIONGAP <1* 13 9   HARDEEP 8.3* 8.5 9.5   * 124* 127*     Most Recent 2 LFT's:  Recent Labs   Lab Test 07/12/22  1003 07/05/22  0922   AST 18 22   ALT 18 22   ALKPHOS 53 52 " "  BILITOTAL 0.3 0.4       Assessment and Plan  Logan has multiple myeloma with high risk cytogenetics. He got Vidya-RVD with VGPR, then underwent autologous transplant 11/11/21. Continue acyclovir daily for viral ppx and bactrim M/T for PJP ppx. Given his high risk cytogenetics he started vidya + revlimid maintenance therapy 2/21/22. Follow IgG and replete if it remains <300. He had severe flu-like effects from his first dose of Zometa but he has been tolerating monthly Zometa dosed at 3 mg. Continue Vit D. Continue B vitamin supplement to help his neuropathy     Serous otitis media  He has serous otitis media in his left year, which could explain his dizziness. This could have developed in response to his COVID vaccination, or it could be a coincidental timing. Will trial nasal steroids (Flonase) and a decongestant (Sudafed) for the next 3 days to try to assist with eustachian tube drainage. I have also prescribed Meclizine to help with his dizziness symptoms.  I will see him back in clinic to reassess prior to his next vidya infusion.    Dizziness  Likely secondary to serous otitis media, although he may have also had a component of dehydration involved due to his recent diarrhea and the mixed picture of vertigo and pre-syncope. Creatinine function is within normal limits, and although he reports a few days of poor intake, he is currently taking in over 100 oz orally daily. Encouraged him to continue with this excellent oral intake to help combat any lingering hypervolemia. I prescribed Meclizine to help with the vertigo symptoms. If not improved by Friday, may need to consider PT or ENT referral.    Hypertension  He reported two days of hypertension over the weekend, with SBP as high as 178.  BP is WNL in clinic today.  He reports drinking \"a lot\" of a home concoction of water, sugar, salt, and lemon; increased salt content could explain higher blood pressures over the weekend.  No intervention needed based on today's " BP; will continue to monitor at future appointments.      42 minutes spent on the date of the encounter doing chart review, review of test results, patient visit and documentation           Again, thank you for allowing me to participate in the care of your patient.      Sincerely,    PATTY Solis CNP

## 2022-07-13 DIAGNOSIS — D80.1 HYPOGAMMAGLOBULINEMIA (H): Primary | ICD-10-CM

## 2022-07-13 LAB
ALBUMIN SERPL ELPH-MCNC: 3.7 G/DL (ref 3.7–5.1)
ALPHA1 GLOB SERPL ELPH-MCNC: 0.3 G/DL (ref 0.2–0.4)
ALPHA2 GLOB SERPL ELPH-MCNC: 0.6 G/DL (ref 0.5–0.9)
B-GLOBULIN SERPL ELPH-MCNC: 0.7 G/DL (ref 0.6–1)
GAMMA GLOB SERPL ELPH-MCNC: 0.3 G/DL (ref 0.7–1.6)
IGA SERPL-MCNC: 30 MG/DL (ref 84–499)
IGG SERPL-MCNC: 261 MG/DL (ref 610–1616)
IGM SERPL-MCNC: <10 MG/DL (ref 35–242)
KAPPA LC FREE SER-MCNC: 0.81 MG/DL (ref 0.33–1.94)
KAPPA LC FREE/LAMBDA FREE SER NEPH: 1.29 {RATIO} (ref 0.26–1.65)
LAMBDA LC FREE SERPL-MCNC: 0.63 MG/DL (ref 0.57–2.63)
M PROTEIN SERPL ELPH-MCNC: 0.1 G/DL
PROT PATTERN SERPL ELPH-IMP: ABNORMAL
PROT PATTERN SERPL IFE-IMP: NORMAL

## 2022-07-13 RX ORDER — EPINEPHRINE 1 MG/ML
0.3 INJECTION, SOLUTION INTRAMUSCULAR; SUBCUTANEOUS EVERY 5 MIN PRN
Status: CANCELLED | OUTPATIENT
Start: 2022-07-13

## 2022-07-13 RX ORDER — HEPARIN SODIUM (PORCINE) LOCK FLUSH IV SOLN 100 UNIT/ML 100 UNIT/ML
5 SOLUTION INTRAVENOUS
Status: CANCELLED | OUTPATIENT
Start: 2022-07-13

## 2022-07-13 RX ORDER — DIPHENHYDRAMINE HCL 25 MG
50 CAPSULE ORAL ONCE
Status: CANCELLED
Start: 2022-07-13

## 2022-07-13 RX ORDER — DIPHENHYDRAMINE HYDROCHLORIDE 50 MG/ML
50 INJECTION INTRAMUSCULAR; INTRAVENOUS
Status: CANCELLED
Start: 2022-07-13

## 2022-07-13 RX ORDER — ACETAMINOPHEN 325 MG/1
650 TABLET ORAL ONCE
Status: CANCELLED
Start: 2022-07-13

## 2022-07-13 RX ORDER — HEPARIN SODIUM,PORCINE 10 UNIT/ML
5 VIAL (ML) INTRAVENOUS
Status: CANCELLED | OUTPATIENT
Start: 2022-07-13

## 2022-07-13 RX ORDER — MEPERIDINE HYDROCHLORIDE 25 MG/ML
25 INJECTION INTRAMUSCULAR; INTRAVENOUS; SUBCUTANEOUS EVERY 30 MIN PRN
Status: CANCELLED | OUTPATIENT
Start: 2022-07-13

## 2022-07-13 RX ORDER — METHYLPREDNISOLONE SODIUM SUCCINATE 125 MG/2ML
125 INJECTION, POWDER, LYOPHILIZED, FOR SOLUTION INTRAMUSCULAR; INTRAVENOUS
Status: CANCELLED
Start: 2022-07-13

## 2022-07-13 RX ORDER — ALBUTEROL SULFATE 90 UG/1
1-2 AEROSOL, METERED RESPIRATORY (INHALATION)
Status: CANCELLED
Start: 2022-07-13

## 2022-07-13 RX ORDER — ALBUTEROL SULFATE 0.83 MG/ML
2.5 SOLUTION RESPIRATORY (INHALATION)
Status: CANCELLED | OUTPATIENT
Start: 2022-07-13

## 2022-07-15 ENCOUNTER — TELEPHONE (OUTPATIENT)
Dept: ONCOLOGY | Facility: CLINIC | Age: 52
End: 2022-07-15

## 2022-07-15 ENCOUNTER — ONCOLOGY VISIT (OUTPATIENT)
Dept: ONCOLOGY | Facility: CLINIC | Age: 52
End: 2022-07-15
Attending: REGISTERED NURSE
Payer: COMMERCIAL

## 2022-07-15 ENCOUNTER — LAB (OUTPATIENT)
Dept: LAB | Facility: CLINIC | Age: 52
End: 2022-07-15
Attending: REGISTERED NURSE
Payer: COMMERCIAL

## 2022-07-15 VITALS
TEMPERATURE: 97.8 F | OXYGEN SATURATION: 99 % | SYSTOLIC BLOOD PRESSURE: 135 MMHG | WEIGHT: 160 LBS | DIASTOLIC BLOOD PRESSURE: 81 MMHG | RESPIRATION RATE: 18 BRPM | HEART RATE: 86 BPM | BODY MASS INDEX: 25.82 KG/M2

## 2022-07-15 DIAGNOSIS — Z00.6 RESEARCH STUDY PATIENT: ICD-10-CM

## 2022-07-15 DIAGNOSIS — R42 DIZZINESS: ICD-10-CM

## 2022-07-15 DIAGNOSIS — C90.00 MULTIPLE MYELOMA NOT HAVING ACHIEVED REMISSION (H): Primary | ICD-10-CM

## 2022-07-15 DIAGNOSIS — I10 HYPERTENSION, UNSPECIFIED TYPE: ICD-10-CM

## 2022-07-15 DIAGNOSIS — C90.00 MULTIPLE MYELOMA NOT HAVING ACHIEVED REMISSION (H): ICD-10-CM

## 2022-07-15 LAB
ALBUMIN SERPL-MCNC: 3.7 G/DL (ref 3.4–5)
ALP SERPL-CCNC: 56 U/L (ref 40–150)
ALT SERPL W P-5'-P-CCNC: 20 U/L (ref 0–70)
ANION GAP SERPL CALCULATED.3IONS-SCNC: 5 MMOL/L (ref 3–14)
AST SERPL W P-5'-P-CCNC: 22 U/L (ref 0–45)
BASOPHILS # BLD AUTO: 0 10E3/UL (ref 0–0.2)
BASOPHILS NFR BLD AUTO: 1 %
BILIRUB SERPL-MCNC: 0.4 MG/DL (ref 0.2–1.3)
BUN SERPL-MCNC: 10 MG/DL (ref 7–30)
CALCIUM SERPL-MCNC: 8.5 MG/DL (ref 8.5–10.1)
CHLORIDE BLD-SCNC: 110 MMOL/L (ref 94–109)
CO2 SERPL-SCNC: 22 MMOL/L (ref 20–32)
CREAT SERPL-MCNC: 1.01 MG/DL (ref 0.66–1.25)
EOSINOPHIL # BLD AUTO: 0.4 10E3/UL (ref 0–0.7)
EOSINOPHIL NFR BLD AUTO: 8 %
ERYTHROCYTE [DISTWIDTH] IN BLOOD BY AUTOMATED COUNT: 15.5 % (ref 10–15)
GFR SERPL CREATININE-BSD FRML MDRD: 88 ML/MIN/1.73M2
GLUCOSE BLD-MCNC: 126 MG/DL (ref 70–99)
HCT VFR BLD AUTO: 33.4 % (ref 40–53)
HGB BLD-MCNC: 11 G/DL (ref 13.3–17.7)
IMM GRANULOCYTES # BLD: 0 10E3/UL
IMM GRANULOCYTES NFR BLD: 0 %
LYMPHOCYTES # BLD AUTO: 1.8 10E3/UL (ref 0.8–5.3)
LYMPHOCYTES NFR BLD AUTO: 32 %
MCH RBC QN AUTO: 30.2 PG (ref 26.5–33)
MCHC RBC AUTO-ENTMCNC: 32.9 G/DL (ref 31.5–36.5)
MCV RBC AUTO: 92 FL (ref 78–100)
MONOCYTES # BLD AUTO: 0.7 10E3/UL (ref 0–1.3)
MONOCYTES NFR BLD AUTO: 12 %
NEUTROPHILS # BLD AUTO: 2.7 10E3/UL (ref 1.6–8.3)
NEUTROPHILS NFR BLD AUTO: 47 %
NRBC # BLD AUTO: 0 10E3/UL
NRBC BLD AUTO-RTO: 0 /100
PLATELET # BLD AUTO: 346 10E3/UL (ref 150–450)
POTASSIUM BLD-SCNC: 3.4 MMOL/L (ref 3.4–5.3)
PROT SERPL-MCNC: 6.4 G/DL (ref 6.8–8.8)
RBC # BLD AUTO: 3.64 10E6/UL (ref 4.4–5.9)
SODIUM SERPL-SCNC: 137 MMOL/L (ref 133–144)
WBC # BLD AUTO: 5.6 10E3/UL (ref 4–11)

## 2022-07-15 PROCEDURE — 96401 CHEMO ANTI-NEOPL SQ/IM: CPT

## 2022-07-15 PROCEDURE — 250N000011 HC RX IP 250 OP 636: Performed by: STUDENT IN AN ORGANIZED HEALTH CARE EDUCATION/TRAINING PROGRAM

## 2022-07-15 PROCEDURE — 258N000003 HC RX IP 258 OP 636: Performed by: STUDENT IN AN ORGANIZED HEALTH CARE EDUCATION/TRAINING PROGRAM

## 2022-07-15 PROCEDURE — 80053 COMPREHEN METABOLIC PANEL: CPT | Performed by: REGISTERED NURSE

## 2022-07-15 PROCEDURE — 96365 THER/PROPH/DIAG IV INF INIT: CPT

## 2022-07-15 PROCEDURE — 99215 OFFICE O/P EST HI 40 MIN: CPT | Performed by: REGISTERED NURSE

## 2022-07-15 PROCEDURE — 85025 COMPLETE CBC W/AUTO DIFF WBC: CPT | Performed by: REGISTERED NURSE

## 2022-07-15 PROCEDURE — G0463 HOSPITAL OUTPT CLINIC VISIT: HCPCS

## 2022-07-15 PROCEDURE — 36415 COLL VENOUS BLD VENIPUNCTURE: CPT | Performed by: REGISTERED NURSE

## 2022-07-15 PROCEDURE — 99001 SPECIMEN HANDLING PT-LAB: CPT

## 2022-07-15 RX ORDER — LENALIDOMIDE 10 MG/1
10 CAPSULE ORAL DAILY
Qty: 28 CAPSULE | Refills: 0 | Status: SHIPPED | OUTPATIENT
Start: 2022-07-15 | End: 2022-11-19

## 2022-07-15 RX ADMIN — DARATUMUMAB AND HYALURONIDASE-FIHJ (HUMAN RECOMBINANT) 1800 MG: 1800; 30000 INJECTION SUBCUTANEOUS at 15:50

## 2022-07-15 RX ADMIN — SODIUM CHLORIDE 250 ML: 9 INJECTION, SOLUTION INTRAVENOUS at 15:45

## 2022-07-15 RX ADMIN — ZOLEDRONIC ACID 3 MG: 4 INJECTION, SOLUTION, CONCENTRATE INTRAVENOUS at 15:45

## 2022-07-15 ASSESSMENT — PAIN SCALES - GENERAL: PAINLEVEL: NO PAIN (0)

## 2022-07-15 NOTE — NURSING NOTE
Chief Complaint   Patient presents with     Labs Only     Venipuncture, vitals checked     Alyce Gibbs RN on 7/15/2022 at 2:44 PM

## 2022-07-15 NOTE — LETTER
7/15/2022         RE: June Ronquillo  [address]  [facility] [address]        Dear Colleague,    Thank you for referring your patient, June Ronquillo, to the [facility]. Please see a copy of my visit note below.    Oncologic Hx:   - 4/30/2021 M spike of 34.8 in urine.M spike in blood 0.2; IgG 702 with depressed IgA and IgM. Beta 2 microglobulin 2.7. Lambda  with K/L ratio of 0.01. WBC 11.1 with absolute eos of 1.0. hgb, plt, Cr (1.1), and albumin WNL.    -4/30/2021 CT abd/pelvis showed sclerotic marrow changes with numerous lytic appearing lesions throughout the imaged portions of the spine, pelvis and ribs.      -PET scan 5/11/2021 Numerous osteolytic lesions which predominantly demonstrate uptake below liver background levels. There is one intraosseous lesion in the left lateral 9th rib that demonstrates uptake above background, possibly due to nondisplaced fracture. Adjacent to this lesion is mild  hypermetabolism to the left pleura, with new small left pleural effusion, suspicious for malignant effusion versus posttraumatic effusion. Pleural uptake may represent extramedullary myeloma extending along the intercostal space versus inflammation.    - BM bx 5/17/2021 with flow showing 4.0% plasma cells which express CD19 (dim), CD38 (bright), CD45 (dim), , and monotypic cytoplasmic lambda immunoglobulin light chains but lack CD20 and CD56.  Hypercellular bone marrow for age (approximately 75% cellularity) with adequate trilineage hematopoiesis. Plasma cell infiltrate: Interstitial, lambda monotypic and representing approximately 60% the bone marrow cellularity, by  immunohistochemical stain. Cytpgenetics with 2 related hypodiploid clones. One with loss of Y, monosomy 13 and 14 (5%) and one with translocation of 8p and 14, derivative 16 with long arm replaced by extra copy 1q,monosomy 21. FISH showed gain of 1q, loss of 13 and 14, IGH-MYC fusion  t(8:14)    - Started Miracle-RVd 21 day with velcade on days 1,8,15.     -Cycle 2 Miracle-RVd. Dose reduce dex to 80 mg d/t fatigue and stomach upset on dex days. Also having cough with basilar streaking on CXR    - 8/31/2021 BM bx -rare suspicious plasma cells in touch imprint. No increase in plasma cells by morph.    -8/31/2021 PET/CT Diffuse osteolytic lesions throughout the axial and appendicular skeleton. Increased sclerosis since prior exam 5/11/2021 without increased metabolism or size.  Hypermetabolic pretracheal lymph node as well as hypermetabolic level 2 lymph nodes within the right neck. These lymph nodes are likely reactive from autoimmune activation via medical therapy. Hypermetabolic subcentimeter nodules within the thyroid gland    - 11/11/2021 Autologous BMT    - 2/21/22 started maintenance with Revlimid 10 mg daily and daratumumab monthly; did Revlimid alone for C1 due to low IgG levels, added daratumumab starting C2    Interval Hx:   He feels like his dizziness is improving. He has had two episodes today with position changes no further vertigo-like episodes. He hasn't taken any nasal spray or sudafed today.  His diarrhea has improved, has had 2 loose stools since Tuesday. No other new symptoms or concerns to report.Patient denies fevers, chills, night sweats, unexplained weight changes, headaches, dizziness, vision or hearing changes, new lumps or bumps, chest pain, shortness of breath, cough, abdominal pain, nausea, vomiting, changes to bowel or bladder, swelling of extremities, bleeding issues, or rash.    A comprehensive review of systems was completed and negative except as described above.       Physical Exam:  /81   Pulse 86   Temp 97.8  F (36.6  C)   Resp 18   Wt 72.6 kg (160 lb)   SpO2 99%   BMI 25.82 kg/m    General: No acute distress  HEENT: Bilateral TM pearly grey with identifiable landmarks and light reflex; Sclera anicteric. Oral mucosa pink and moist.  No mucositis or  thrush  Lymph: No lymphadenopathy in neck   Heart: Regular, rate, and rhythm  Lungs: Bilateral lobes CTA  Abdomen: Positive bowel sounds. Soft, non-distended, non-tender. No organomegaly or mass.   Extremities: no lower extremity edema  Neuro: Cranial nerves grossly intact      Labs:  I personally reviewed the following labs:    Most Recent 3 CBC's:  Recent Labs   Lab Test 07/15/22  1440 07/12/22  1003 07/05/22  0922   WBC 5.6 5.4 6.2   HGB 11.0* 11.2* 11.4*   MCV 92 91 93    330 349     Most Recent 3 BMP's:  Recent Labs   Lab Test 07/15/22  1440 07/12/22  1003 07/05/22  0922    134 142   POTASSIUM 3.4 3.5 3.8   CHLORIDE 110* 114* 109   CO2 22 20 20   BUN 10 13 11   CR 1.01 1.06 1.09   ANIONGAP 5 <1* 13   HARDEEP 8.5 8.3* 8.5   * 116* 124*     Most Recent 2 LFT's:  Recent Labs   Lab Test 07/15/22  1440 07/12/22  1003   AST 22 18   ALT 20 18   ALKPHOS 56 53   BILITOTAL 0.4 0.3       Assessment and Plan  Logan has multiple myeloma with high risk cytogenetics. He got Vidya-RVD with VGPR, then underwent autologous transplant 11/11/21. Continue acyclovir daily for viral ppx and bactrim M/T for PJP ppx. Given his high risk cytogenetics he started vidya + revlimid maintenance therapy 2/21/22. Follow IgG and replete if it remains <300. He had severe flu-like effects from his first dose of Zometa but he has been tolerating monthly Zometa dosed at 3 mg. Continue Vit D. Continue B vitamin supplement to help his neuropathy     Serous otitis media  Serous otitis media in his left ear has improved since earlier this week. He used nasal steroids (Flonase) and a decongestant (Sudafed) for several days, has not taken anything today.     Dizziness  Likely secondary to serous otitis media, although he may have also had a component of dehydration involved due to his recent diarrhea and the mixed picture of vertigo and pre-syncope. Creatinine function is within normal limits and his oral intake is excellent. Dizziness has  "improved over the course of this week, has only had two episodes today. Anticipate this will continue to improve, likely had a COVID vaccine response and/or brief viral illness that triggered his symptoms.     Hypertension  He reported two days of hypertension over the weekend, with SBP as high as 178.  BP is WNL in clinic today.  He reports drinking \"a lot\" of a home concoction of water, sugar, salt, and lemon; increased salt content could explain higher blood pressures over the weekend.  No intervention needed based on today's BP; will continue to monitor at future appointments.      41 minutes spent on the date of the encounter doing chart review, review of test results, patient visit and documentation             Again, thank you for allowing me to participate in the care of your patient.      Sincerely,    PATTY Solis CNP    "

## 2022-07-15 NOTE — TELEPHONE ENCOUNTER
Oral Chemotherapy Monitoring Program   Medication: Revlimid  Rx: 10mg PO daily on days 1 through 28 of 28 day cycle   Auth #: [id number]   Risk Category: Adult Male  Routine survey questions reviewed.   Rx to be Escribed to [facility]    Yu Goldberg  [facility]  Oncology Pharmacy Liaison   [email or web address]  [phone] (phone)  [phone] (fax)

## 2022-07-15 NOTE — NURSING NOTE
"Oncology Rooming Note    July 15, 2022 2:56 PM   June Ronquillo is a 52 year old male who presents for:    Chief Complaint   Patient presents with     Labs Only     Venipuncture, vitals checked     Oncology Clinic Visit     MM     Initial Vitals: /81   Pulse 86   Temp 97.8  F (36.6  C)   Resp 18   Wt 72.6 kg (160 lb)   SpO2 99%   BMI 25.82 kg/m   Estimated body mass index is 25.82 kg/m  as calculated from the following:    Height as of 6/24/22: 1.676 m (5' 6\").    Weight as of this encounter: 72.6 kg (160 lb). Body surface area is 1.84 meters squared.  No Pain (0) Comment: Data Unavailable   No LMP for male patient.  Allergies reviewed: Yes  Medications reviewed: Yes    Medications: Medication refills not needed today.  Pharmacy name entered into EPIC:    [facility] [place], [place] - [address]  [facility] - [place], [place] - [address] [place] [address] [address]  [facility] [place], [place] - [phone]    Clinical concerns: none       Ana Cristina Cisneros"

## 2022-07-15 NOTE — PROGRESS NOTES
Infusion Nursing Note:  June Ronquillo presents today for Cycle 7 Day 1 Darzalex Faspro and Zometa.    Patient seen by provider today: Yes:  Laura Bustamante CNP   present during visit today: Not Applicable.    Note: June comes into the clinic today doing well overall and has no concerns to relay following his provider appointment. He has no pain and feels well for treatment.    Patient endorses taking Vitamin D BID as prescribed.     Intravenous Access:  Peripheral IV placed.    Treatment Conditions:  Lab Results   Component Value Date    HGB 11.0 (L) 07/15/2022    WBC 5.6 07/15/2022    ANEU 3.0 07/12/2022    ANEUTAUTO 2.7 07/15/2022     07/15/2022      Lab Results   Component Value Date     07/15/2022    POTASSIUM 3.4 07/15/2022    MAG 1.8 05/09/2022    CR 1.01 07/15/2022    HARDEEP 8.5 07/15/2022    BILITOTAL 0.4 07/15/2022    ALBUMIN 3.7 07/15/2022    ALT 20 07/15/2022    AST 22 07/15/2022     Results reviewed, labs MET treatment parameters, ok to proceed with treatment.    Post Infusion Assessment:  Patient tolerated infusion without incident.  Patient tolerated Darzalex Faspro injection to LLQ abdomen without incident  Blood return noted pre and post infusion.  Site patent and intact, free from redness, edema or discomfort.  No evidence of extravasations.  Access discontinued per protocol.     Discharge Plan:   Patient declined prescription refills.  Discharge instructions reviewed with: Patient.  Patient and/or family verbalized understanding of discharge instructions and all questions answered.  AVS to patient via [employer].  Patient will return 8/17/22 for next appointment.   Patient discharged in stable condition accompanied by: self.  Departure Mode: Ambulatory.      Lacy Ferguson RN

## 2022-07-15 NOTE — PROGRESS NOTES
Oncologic Hx:   - 4/30/2021 M spike of 34.8 in urine.M spike in blood 0.2; IgG 702 with depressed IgA and IgM. Beta 2 microglobulin 2.7. Lambda  with K/L ratio of 0.01. WBC 11.1 with absolute eos of 1.0. hgb, plt, Cr (1.1), and albumin WNL.    -4/30/2021 CT abd/pelvis showed sclerotic marrow changes with numerous lytic appearing lesions throughout the imaged portions of the spine, pelvis and ribs.      -PET scan 5/11/2021 Numerous osteolytic lesions which predominantly demonstrate uptake below liver background levels. There is one intraosseous lesion in the left lateral 9th rib that demonstrates uptake above background, possibly due to nondisplaced fracture. Adjacent to this lesion is mild  hypermetabolism to the left pleura, with new small left pleural effusion, suspicious for malignant effusion versus posttraumatic effusion. Pleural uptake may represent extramedullary myeloma extending along the intercostal space versus inflammation.    - BM bx 5/17/2021 with flow showing 4.0% plasma cells which express CD19 (dim), CD38 (bright), CD45 (dim), , and monotypic cytoplasmic lambda immunoglobulin light chains but lack CD20 and CD56.  Hypercellular bone marrow for age (approximately 75% cellularity) with adequate trilineage hematopoiesis. Plasma cell infiltrate: Interstitial, lambda monotypic and representing approximately 60% the bone marrow cellularity, by  immunohistochemical stain. Cytpgenetics with 2 related hypodiploid clones. One with loss of Y, monosomy 13 and 14 (5%) and one with translocation of 8p and 14, derivative 16 with long arm replaced by extra copy 1q,monosomy 21. FISH showed gain of 1q, loss of 13 and 14, IGH-MYC fusion t(8:14)    - Started Miracle-RVd 21 day with velcade on days 1,8,15.     -Cycle 2 Miracle-RVd. Dose reduce dex to 80 mg d/t fatigue and stomach upset on dex days. Also having cough with basilar streaking on CXR    - 8/31/2021 BM bx -rare suspicious plasma cells in touch  imprint. No increase in plasma cells by morph.    -8/31/2021 PET/CT Diffuse osteolytic lesions throughout the axial and appendicular skeleton. Increased sclerosis since prior exam 5/11/2021 without increased metabolism or size.  Hypermetabolic pretracheal lymph node as well as hypermetabolic level 2 lymph nodes within the right neck. These lymph nodes are likely reactive from autoimmune activation via medical therapy. Hypermetabolic subcentimeter nodules within the thyroid gland    - 11/11/2021 Autologous BMT    - 2/21/22 started maintenance with Revlimid 10 mg daily and daratumumab monthly; did Revlimid alone for C1 due to low IgG levels, added daratumumab starting C2    Interval Hx:   He feels like his dizziness is improving. He has had two episodes today with position changes no further vertigo-like episodes. He hasn't taken any nasal spray or sudafed today.  His diarrhea has improved, has had 2 loose stools since Tuesday. No other new symptoms or concerns to report.Patient denies fevers, chills, night sweats, unexplained weight changes, headaches, dizziness, vision or hearing changes, new lumps or bumps, chest pain, shortness of breath, cough, abdominal pain, nausea, vomiting, changes to bowel or bladder, swelling of extremities, bleeding issues, or rash.    A comprehensive review of systems was completed and negative except as described above.       Physical Exam:  /81   Pulse 86   Temp 97.8  F (36.6  C)   Resp 18   Wt 72.6 kg (160 lb)   SpO2 99%   BMI 25.82 kg/m    General: No acute distress  HEENT: Bilateral TM pearly grey with identifiable landmarks and light reflex; Sclera anicteric. Oral mucosa pink and moist.  No mucositis or thrush  Lymph: No lymphadenopathy in neck   Heart: Regular, rate, and rhythm  Lungs: Bilateral lobes CTA  Abdomen: Positive bowel sounds. Soft, non-distended, non-tender. No organomegaly or mass.   Extremities: no lower extremity edema  Neuro: Cranial nerves grossly  intact      Labs:  I personally reviewed the following labs:    Most Recent 3 CBC's:  Recent Labs   Lab Test 07/15/22  1440 07/12/22  1003 07/05/22  0922   WBC 5.6 5.4 6.2   HGB 11.0* 11.2* 11.4*   MCV 92 91 93    330 349     Most Recent 3 BMP's:  Recent Labs   Lab Test 07/15/22  1440 07/12/22  1003 07/05/22  0922    134 142   POTASSIUM 3.4 3.5 3.8   CHLORIDE 110* 114* 109   CO2 22 20 20   BUN 10 13 11   CR 1.01 1.06 1.09   ANIONGAP 5 <1* 13   HARDEEP 8.5 8.3* 8.5   * 116* 124*     Most Recent 2 LFT's:  Recent Labs   Lab Test 07/15/22  1440 07/12/22  1003   AST 22 18   ALT 20 18   ALKPHOS 56 53   BILITOTAL 0.4 0.3       Assessment and Plan  Logan has multiple myeloma with high risk cytogenetics. He got Vidya-RVD with VGPR, then underwent autologous transplant 11/11/21. Continue acyclovir daily for viral ppx and bactrim M/T for PJP ppx. Given his high risk cytogenetics he started vidya + revlimid maintenance therapy 2/21/22. Follow IgG and replete if it remains <300. He had severe flu-like effects from his first dose of Zometa but he has been tolerating monthly Zometa dosed at 3 mg. Continue Vit D. Continue B vitamin supplement to help his neuropathy     Serous otitis media  Serous otitis media in his left ear has improved since earlier this week. He used nasal steroids (Flonase) and a decongestant (Sudafed) for several days, has not taken anything today.     Dizziness  Likely secondary to serous otitis media, although he may have also had a component of dehydration involved due to his recent diarrhea and the mixed picture of vertigo and pre-syncope. Creatinine function is within normal limits and his oral intake is excellent. Dizziness has improved over the course of this week, has only had two episodes today. Anticipate this will continue to improve, likely had a COVID vaccine response and/or brief viral illness that triggered his symptoms.     Hypertension  He reported two days of hypertension over the  "weekend, with SBP as high as 178.  BP is WNL in clinic today.  He reports drinking \"a lot\" of a home concoction of water, sugar, salt, and lemon; increased salt content could explain higher blood pressures over the weekend.  No intervention needed based on today's BP; will continue to monitor at future appointments.      41 minutes spent on the date of the encounter doing chart review, review of test results, patient visit and documentation     PATTY Solis [facility]  [address] [place] [address] [id number] [phone]  "

## 2022-08-08 DIAGNOSIS — C90.00 MULTIPLE MYELOMA NOT HAVING ACHIEVED REMISSION (H): Primary | ICD-10-CM

## 2022-08-08 RX ORDER — EPINEPHRINE 1 MG/ML
0.3 INJECTION, SOLUTION INTRAMUSCULAR; SUBCUTANEOUS EVERY 5 MIN PRN
Status: CANCELLED | OUTPATIENT
Start: 2022-09-13

## 2022-08-08 RX ORDER — HEPARIN SODIUM,PORCINE 10 UNIT/ML
5 VIAL (ML) INTRAVENOUS
Status: CANCELLED | OUTPATIENT
Start: 2022-10-11

## 2022-08-08 RX ORDER — EPINEPHRINE 1 MG/ML
0.3 INJECTION, SOLUTION INTRAMUSCULAR; SUBCUTANEOUS EVERY 5 MIN PRN
Status: CANCELLED | OUTPATIENT
Start: 2022-08-12

## 2022-08-08 RX ORDER — DEXAMETHASONE 4 MG/1
12 TABLET ORAL ONCE
Status: CANCELLED
Start: 2022-09-13 | End: 2022-09-09

## 2022-08-08 RX ORDER — ALBUTEROL SULFATE 0.83 MG/ML
2.5 SOLUTION RESPIRATORY (INHALATION)
Status: CANCELLED | OUTPATIENT
Start: 2022-10-11

## 2022-08-08 RX ORDER — DEXAMETHASONE 4 MG/1
12 TABLET ORAL ONCE
Status: CANCELLED
Start: 2022-08-12 | End: 2022-08-12

## 2022-08-08 RX ORDER — ACETAMINOPHEN 325 MG/1
650 TABLET ORAL ONCE
Status: CANCELLED
Start: 2022-10-11 | End: 2022-10-07

## 2022-08-08 RX ORDER — ALBUTEROL SULFATE 0.83 MG/ML
2.5 SOLUTION RESPIRATORY (INHALATION)
Status: CANCELLED | OUTPATIENT
Start: 2022-08-12

## 2022-08-08 RX ORDER — HEPARIN SODIUM,PORCINE 10 UNIT/ML
5 VIAL (ML) INTRAVENOUS
Status: CANCELLED | OUTPATIENT
Start: 2022-08-12

## 2022-08-08 RX ORDER — MEPERIDINE HYDROCHLORIDE 25 MG/ML
25 INJECTION INTRAMUSCULAR; INTRAVENOUS; SUBCUTANEOUS EVERY 30 MIN PRN
Status: CANCELLED | OUTPATIENT
Start: 2022-08-12

## 2022-08-08 RX ORDER — DIPHENHYDRAMINE HYDROCHLORIDE 50 MG/ML
50 INJECTION INTRAMUSCULAR; INTRAVENOUS
Status: CANCELLED
Start: 2022-08-12

## 2022-08-08 RX ORDER — DEXAMETHASONE 4 MG/1
12 TABLET ORAL ONCE
Status: CANCELLED
Start: 2022-10-11 | End: 2022-10-07

## 2022-08-08 RX ORDER — METHYLPREDNISOLONE SODIUM SUCCINATE 125 MG/2ML
125 INJECTION, POWDER, LYOPHILIZED, FOR SOLUTION INTRAMUSCULAR; INTRAVENOUS
Status: CANCELLED
Start: 2022-09-13

## 2022-08-08 RX ORDER — DIPHENHYDRAMINE HCL 25 MG
50 CAPSULE ORAL ONCE
Status: CANCELLED
Start: 2022-09-13 | End: 2022-09-09

## 2022-08-08 RX ORDER — MEPERIDINE HYDROCHLORIDE 25 MG/ML
25 INJECTION INTRAMUSCULAR; INTRAVENOUS; SUBCUTANEOUS EVERY 30 MIN PRN
Status: CANCELLED | OUTPATIENT
Start: 2022-10-11

## 2022-08-08 RX ORDER — ALBUTEROL SULFATE 0.83 MG/ML
2.5 SOLUTION RESPIRATORY (INHALATION)
Status: CANCELLED | OUTPATIENT
Start: 2022-09-13

## 2022-08-08 RX ORDER — ALBUTEROL SULFATE 90 UG/1
1-2 AEROSOL, METERED RESPIRATORY (INHALATION)
Status: CANCELLED
Start: 2022-10-11

## 2022-08-08 RX ORDER — MEPERIDINE HYDROCHLORIDE 25 MG/ML
25 INJECTION INTRAMUSCULAR; INTRAVENOUS; SUBCUTANEOUS EVERY 30 MIN PRN
Status: CANCELLED | OUTPATIENT
Start: 2022-09-13

## 2022-08-08 RX ORDER — HEPARIN SODIUM (PORCINE) LOCK FLUSH IV SOLN 100 UNIT/ML 100 UNIT/ML
5 SOLUTION INTRAVENOUS
Status: CANCELLED | OUTPATIENT
Start: 2022-10-11

## 2022-08-08 RX ORDER — ACETAMINOPHEN 325 MG/1
650 TABLET ORAL ONCE
Status: CANCELLED
Start: 2022-09-13 | End: 2022-09-09

## 2022-08-08 RX ORDER — EPINEPHRINE 1 MG/ML
0.3 INJECTION, SOLUTION INTRAMUSCULAR; SUBCUTANEOUS EVERY 5 MIN PRN
Status: CANCELLED | OUTPATIENT
Start: 2022-10-11

## 2022-08-08 RX ORDER — NALOXONE HYDROCHLORIDE 0.4 MG/ML
0.2 INJECTION, SOLUTION INTRAMUSCULAR; INTRAVENOUS; SUBCUTANEOUS
Status: CANCELLED | OUTPATIENT
Start: 2022-08-12

## 2022-08-08 RX ORDER — METHYLPREDNISOLONE SODIUM SUCCINATE 125 MG/2ML
125 INJECTION, POWDER, LYOPHILIZED, FOR SOLUTION INTRAMUSCULAR; INTRAVENOUS
Status: CANCELLED
Start: 2022-10-11

## 2022-08-08 RX ORDER — HEPARIN SODIUM,PORCINE 10 UNIT/ML
5 VIAL (ML) INTRAVENOUS
Status: CANCELLED | OUTPATIENT
Start: 2022-09-13

## 2022-08-08 RX ORDER — NALOXONE HYDROCHLORIDE 0.4 MG/ML
0.2 INJECTION, SOLUTION INTRAMUSCULAR; INTRAVENOUS; SUBCUTANEOUS
Status: CANCELLED | OUTPATIENT
Start: 2022-10-11

## 2022-08-08 RX ORDER — DIPHENHYDRAMINE HCL 25 MG
50 CAPSULE ORAL ONCE
Status: CANCELLED
Start: 2022-08-12 | End: 2022-08-12

## 2022-08-08 RX ORDER — METHYLPREDNISOLONE SODIUM SUCCINATE 125 MG/2ML
125 INJECTION, POWDER, LYOPHILIZED, FOR SOLUTION INTRAMUSCULAR; INTRAVENOUS
Status: CANCELLED
Start: 2022-08-12

## 2022-08-08 RX ORDER — ALBUTEROL SULFATE 90 UG/1
1-2 AEROSOL, METERED RESPIRATORY (INHALATION)
Status: CANCELLED
Start: 2022-09-13

## 2022-08-08 RX ORDER — HEPARIN SODIUM (PORCINE) LOCK FLUSH IV SOLN 100 UNIT/ML 100 UNIT/ML
5 SOLUTION INTRAVENOUS
Status: CANCELLED | OUTPATIENT
Start: 2022-08-12

## 2022-08-08 RX ORDER — DIPHENHYDRAMINE HCL 25 MG
50 CAPSULE ORAL ONCE
Status: CANCELLED
Start: 2022-10-11 | End: 2022-10-07

## 2022-08-08 RX ORDER — ACETAMINOPHEN 325 MG/1
650 TABLET ORAL ONCE
Status: CANCELLED
Start: 2022-08-12 | End: 2022-08-12

## 2022-08-08 RX ORDER — ALBUTEROL SULFATE 90 UG/1
1-2 AEROSOL, METERED RESPIRATORY (INHALATION)
Status: CANCELLED
Start: 2022-08-12

## 2022-08-08 RX ORDER — NALOXONE HYDROCHLORIDE 0.4 MG/ML
0.2 INJECTION, SOLUTION INTRAMUSCULAR; INTRAVENOUS; SUBCUTANEOUS
Status: CANCELLED | OUTPATIENT
Start: 2022-09-13

## 2022-08-08 RX ORDER — DIPHENHYDRAMINE HYDROCHLORIDE 50 MG/ML
50 INJECTION INTRAMUSCULAR; INTRAVENOUS
Status: CANCELLED
Start: 2022-10-11

## 2022-08-08 RX ORDER — HEPARIN SODIUM (PORCINE) LOCK FLUSH IV SOLN 100 UNIT/ML 100 UNIT/ML
5 SOLUTION INTRAVENOUS
Status: CANCELLED | OUTPATIENT
Start: 2022-09-13

## 2022-08-08 RX ORDER — DIPHENHYDRAMINE HYDROCHLORIDE 50 MG/ML
50 INJECTION INTRAMUSCULAR; INTRAVENOUS
Status: CANCELLED
Start: 2022-09-13

## 2022-08-10 DIAGNOSIS — C90.00 MULTIPLE MYELOMA NOT HAVING ACHIEVED REMISSION (H): Primary | ICD-10-CM

## 2022-08-10 RX ORDER — LENALIDOMIDE 10 MG/1
10 CAPSULE ORAL DAILY
Qty: 28 CAPSULE | Refills: 0 | Status: SHIPPED | OUTPATIENT
Start: 2022-08-10 | End: 2022-11-19

## 2022-08-11 ENCOUNTER — TELEPHONE (OUTPATIENT)
Dept: ONCOLOGY | Facility: CLINIC | Age: 52
End: 2022-08-11

## 2022-08-11 NOTE — TELEPHONE ENCOUNTER
Oral Chemotherapy Monitoring Program   Medication: Revlimid  Rx: 10mg PO daily on days 1 through 28 of 28 day cycle   Auth #: [id number]   Risk Category: Adult Male  Routine survey questions reviewed.   Rx to be Escribed to [facility]    Yu Goldberg  [facility] [place] [facility]  Oncology Pharmacy Liaison   [email or web address]  [phone] (phone)  [phone] (fax)

## 2022-08-15 NOTE — PROGRESS NOTES
Oncologic Hx:   - 4/30/2021 M spike of 34.8 in urine.M spike in blood 0.2; IgG 702 with depressed IgA and IgM. Beta 2 microglobulin 2.7. Lambda  with K/L ratio of 0.01. WBC 11.1 with absolute eos of 1.0. hgb, plt, Cr (1.1), and albumin WNL.    -4/30/2021 CT abd/pelvis showed sclerotic marrow changes with numerous lytic appearing lesions throughout the imaged portions of the spine, pelvis and ribs.      -PET scan 5/11/2021 Numerous osteolytic lesions which predominantly demonstrate uptake below liver background levels. There is one intraosseous lesion in the left lateral 9th rib that demonstrates uptake above background, possibly due to nondisplaced fracture. Adjacent to this lesion is mild  hypermetabolism to the left pleura, with new small left pleural effusion, suspicious for malignant effusion versus posttraumatic effusion. Pleural uptake may represent extramedullary myeloma extending along the intercostal space versus inflammation.    - BM bx 5/17/2021 with flow showing 4.0% plasma cells which express CD19 (dim), CD38 (bright), CD45 (dim), , and monotypic cytoplasmic lambda immunoglobulin light chains but lack CD20 and CD56.  Hypercellular bone marrow for age (approximately 75% cellularity) with adequate trilineage hematopoiesis. Plasma cell infiltrate: Interstitial, lambda monotypic and representing approximately 60% the bone marrow cellularity, by  immunohistochemical stain. Cytpgenetics with 2 related hypodiploid clones. One with loss of Y, monosomy 13 and 14 (5%) and one with translocation of 8p and 14, derivative 16 with long arm replaced by extra copy 1q,monosomy 21. FISH showed gain of 1q, loss of 13 and 14, IGH-MYC fusion t(8:14)    - Started Miracle-RVd 21 day with velcade on days 1,8,15.     -Cycle 2 Miracle-RVd. Dose reduce dex to 80 mg d/t fatigue and stomach upset on dex days. Also having cough with basilar streaking on CXR    - 8/31/2021 BM bx -rare suspicious plasma cells in touch  "imprint. No increase in plasma cells by morph.    -8/31/2021 PET/CT Diffuse osteolytic lesions throughout the axial and appendicular skeleton. Increased sclerosis since prior exam 5/11/2021 without increased metabolism or size.  Hypermetabolic pretracheal lymph node as well as hypermetabolic level 2 lymph nodes within the right neck. These lymph nodes are likely reactive from autoimmune activation via medical therapy. Hypermetabolic subcentimeter nodules within the thyroid gland    - 11/11/2021 Autologous BMT    - 2/21/22 started maintenance with Revlimid 10 mg daily and daratumumab monthly; did Revlimid alone for C1 due to low IgG levels, added daratumumab starting C2    Interval Hx:   June presents to clinic prior to daratumumab today. He reports he is feeling well. His vertigo is gone. He continues to have an occasional problem with hearing \"fluid bubbles\" in his left ear and some drainage that produces an intermittent cough. He otherwise denies any problems with hearing or tinnitus. His cough is non-productive. He continues to use flonase and decongestants for his left ear changes. He notes his ear feels normal whenever he uses flonase and stays on top of his OTC decongestants. He has not had a recent fever or chills. Energy is stable. No bladder or bowel concerns. No chest pain or shortness of breath. Notes a new nevus on this anterior left thigh. He has a follow up with a dermatologist scheduled. No rashes. No headaches. No night sweats. No new bone pain. No sinus pressure.     He wonders if he should be tested for covid.     A comprehensive review of systems was completed and negative except as described above.     Physical Exam:  BP (!) 151/84   Pulse 81   Temp 97.9  F (36.6  C) (Oral)   Resp 16   Wt 70.8 kg (156 lb)   SpO2 99%   BMI 25.18 kg/m    General: No acute distress  HEENT: Bilateral TM pearly grey with identifiable landmarks and light reflex; Sclera anicteric. Oral mucosa pink and " moist.  No mucositis or thrush  Lymph: No lymphadenopathy in neck   Heart: Regular, rate, and rhythm  Lungs: Bilateral lobes CTA  Abdomen: Positive bowel sounds. Soft, non-distended, non-tender. No organomegaly or mass.   Extremities: no lower extremity edema  Neuro: Cranial nerves grossly intact      Labs:  I personally reviewed the following labs:    Most Recent 3 CBC's:  Recent Labs   Lab Test 08/16/22  1306 07/15/22  1440 07/12/22  1003   WBC 5.0 5.6 5.4   HGB 11.4* 11.0* 11.2*   MCV 92 92 91    346 330     Most Recent 3 BMP's:  Recent Labs   Lab Test 08/16/22  1306 07/15/22  1440 07/12/22  1003    137 134   POTASSIUM 3.8 3.4 3.5   CHLORIDE 108 110* 114*   CO2 22 22 20   BUN 10 10 13   CR 1.23 1.01 1.06   ANIONGAP 8 5 <1*   HARDEEP 8.6 8.5 8.3*   * 126* 116*     Most Recent 2 LFT's:  Recent Labs   Lab Test 07/15/22  1440 07/12/22  1003   AST 22 18   ALT 20 18   ALKPHOS 56 53   BILITOTAL 0.4 0.3       Assessment and Plan  Logan has multiple myeloma with high risk cytogenetics. He got Vidya-RVD with VGPR, then underwent autologous transplant 11/11/21. Continue acyclovir daily for viral ppx and bactrim M/T for PJP ppx. Given his high risk cytogenetics he started vidya + revlimid maintenance therapy 2/21/22. Follow IgG and replete if it remains <300. He had severe flu-like effects from his first dose of Zometa but he has been tolerating monthly Zometa dosed at 3 mg. Continue Vit D. Continue B vitamin supplement to help his neuropathy.   - will proceed with vidya + revlimid today, labs stable.   - Proceed with Zometa.   - Melanoma labs pending today.     Serous otitis media  Serous otitis media in his left ear has been stable but continues to be prolonged. Okay to continue flonase and OTC decongestants prn. His blood pressure is mildly elevated today but has been stable at home at 120-130/70-80. We will need to keep a close eye on his blood pressure while using intermittent decongestants. Start Claritin.  Referral to ENT given prolonged duration of intermittent symptoms.      Dizziness  Improved, no recent episodes of vertigo. No recent presyncope. Creatinine function is within normal limits and his oral intake is excellent.      Hypertension  BP at home have been 120-130/70-80 per patient.  No intervention needed based on today's BP; will continue to monitor at future appointments.     Cough  Will obtain Covid-19 PCR today. Suspect that cough is likely triggered by post nasal drip but will get a chest xray given prolonged symptoms.     30 minutes spent on the date of the encounter doing chart review, review of test results, interpretation of tests, patient visit and documentation     Yasmin Johnson PA-C

## 2022-08-16 ENCOUNTER — ONCOLOGY VISIT (OUTPATIENT)
Dept: ONCOLOGY | Facility: CLINIC | Age: 52
End: 2022-08-16
Attending: STUDENT IN AN ORGANIZED HEALTH CARE EDUCATION/TRAINING PROGRAM
Payer: COMMERCIAL

## 2022-08-16 ENCOUNTER — APPOINTMENT (OUTPATIENT)
Dept: LAB | Facility: CLINIC | Age: 52
End: 2022-08-16
Attending: STUDENT IN AN ORGANIZED HEALTH CARE EDUCATION/TRAINING PROGRAM
Payer: COMMERCIAL

## 2022-08-16 VITALS
SYSTOLIC BLOOD PRESSURE: 151 MMHG | DIASTOLIC BLOOD PRESSURE: 84 MMHG | TEMPERATURE: 97.9 F | WEIGHT: 156 LBS | BODY MASS INDEX: 25.18 KG/M2 | HEART RATE: 81 BPM | RESPIRATION RATE: 16 BRPM | OXYGEN SATURATION: 99 %

## 2022-08-16 DIAGNOSIS — G62.9 PERIPHERAL POLYNEUROPATHY: ICD-10-CM

## 2022-08-16 DIAGNOSIS — Z94.81 STATUS POST BONE MARROW TRANSPLANT (H): ICD-10-CM

## 2022-08-16 DIAGNOSIS — R05.9 COUGH: ICD-10-CM

## 2022-08-16 DIAGNOSIS — H65.90 SEROUS OTITIS MEDIA, UNSPECIFIED CHRONICITY, UNSPECIFIED LATERALITY: Primary | ICD-10-CM

## 2022-08-16 DIAGNOSIS — C90.00 MULTIPLE MYELOMA NOT HAVING ACHIEVED REMISSION (H): Primary | ICD-10-CM

## 2022-08-16 LAB
ALBUMIN SERPL-MCNC: 4.1 G/DL (ref 3.4–5)
ALP SERPL-CCNC: 56 U/L (ref 40–150)
ALT SERPL W P-5'-P-CCNC: 24 U/L (ref 0–70)
ANION GAP SERPL CALCULATED.3IONS-SCNC: 8 MMOL/L (ref 3–14)
AST SERPL W P-5'-P-CCNC: 24 U/L (ref 0–45)
BASOPHILS # BLD AUTO: 0.1 10E3/UL (ref 0–0.2)
BASOPHILS NFR BLD AUTO: 1 %
BILIRUB SERPL-MCNC: 0.3 MG/DL (ref 0.2–1.3)
BUN SERPL-MCNC: 10 MG/DL (ref 7–30)
CALCIUM SERPL-MCNC: 8.6 MG/DL (ref 8.5–10.1)
CHLORIDE BLD-SCNC: 108 MMOL/L (ref 94–109)
CO2 SERPL-SCNC: 22 MMOL/L (ref 20–32)
CREAT SERPL-MCNC: 1.23 MG/DL (ref 0.66–1.25)
EOSINOPHIL # BLD AUTO: 0.4 10E3/UL (ref 0–0.7)
EOSINOPHIL NFR BLD AUTO: 8 %
ERYTHROCYTE [DISTWIDTH] IN BLOOD BY AUTOMATED COUNT: 14.8 % (ref 10–15)
GFR SERPL CREATININE-BSD FRML MDRD: 70 ML/MIN/1.73M2
GLUCOSE BLD-MCNC: 104 MG/DL (ref 70–99)
HCT VFR BLD AUTO: 35.8 % (ref 40–53)
HGB BLD-MCNC: 11.4 G/DL (ref 13.3–17.7)
IMM GRANULOCYTES # BLD: 0 10E3/UL
IMM GRANULOCYTES NFR BLD: 0 %
LYMPHOCYTES # BLD AUTO: 1.8 10E3/UL (ref 0.8–5.3)
LYMPHOCYTES NFR BLD AUTO: 36 %
MCH RBC QN AUTO: 29.2 PG (ref 26.5–33)
MCHC RBC AUTO-ENTMCNC: 31.8 G/DL (ref 31.5–36.5)
MCV RBC AUTO: 92 FL (ref 78–100)
MONOCYTES # BLD AUTO: 0.6 10E3/UL (ref 0–1.3)
MONOCYTES NFR BLD AUTO: 11 %
NEUTROPHILS # BLD AUTO: 2.2 10E3/UL (ref 1.6–8.3)
NEUTROPHILS NFR BLD AUTO: 44 %
NRBC # BLD AUTO: 0 10E3/UL
NRBC BLD AUTO-RTO: 0 /100
PLATELET # BLD AUTO: 327 10E3/UL (ref 150–450)
POTASSIUM BLD-SCNC: 3.8 MMOL/L (ref 3.4–5.3)
PROT SERPL-MCNC: 6.6 G/DL (ref 6.8–8.8)
RBC # BLD AUTO: 3.9 10E6/UL (ref 4.4–5.9)
SARS-COV-2 RNA RESP QL NAA+PROBE: NEGATIVE
SODIUM SERPL-SCNC: 138 MMOL/L (ref 133–144)
TOTAL PROTEIN SERUM FOR ELP: 6 G/DL (ref 6.4–8.3)
WBC # BLD AUTO: 5 10E3/UL (ref 4–11)

## 2022-08-16 PROCEDURE — 84165 PROTEIN E-PHORESIS SERUM: CPT | Mod: TC | Performed by: STUDENT IN AN ORGANIZED HEALTH CARE EDUCATION/TRAINING PROGRAM

## 2022-08-16 PROCEDURE — 86334 IMMUNOFIX E-PHORESIS SERUM: CPT | Mod: 26 | Performed by: STUDENT IN AN ORGANIZED HEALTH CARE EDUCATION/TRAINING PROGRAM

## 2022-08-16 PROCEDURE — 36415 COLL VENOUS BLD VENIPUNCTURE: CPT

## 2022-08-16 PROCEDURE — 85025 COMPLETE CBC W/AUTO DIFF WBC: CPT

## 2022-08-16 PROCEDURE — 84155 ASSAY OF PROTEIN SERUM: CPT

## 2022-08-16 PROCEDURE — U0005 INFEC AGEN DETEC AMPLI PROBE: HCPCS

## 2022-08-16 PROCEDURE — 86334 IMMUNOFIX E-PHORESIS SERUM: CPT | Performed by: STUDENT IN AN ORGANIZED HEALTH CARE EDUCATION/TRAINING PROGRAM

## 2022-08-16 PROCEDURE — 80053 COMPREHEN METABOLIC PANEL: CPT

## 2022-08-16 PROCEDURE — 96401 CHEMO ANTI-NEOPL SQ/IM: CPT

## 2022-08-16 PROCEDURE — 99214 OFFICE O/P EST MOD 30 MIN: CPT | Mod: CS

## 2022-08-16 PROCEDURE — 250N000011 HC RX IP 250 OP 636: Performed by: STUDENT IN AN ORGANIZED HEALTH CARE EDUCATION/TRAINING PROGRAM

## 2022-08-16 PROCEDURE — G0463 HOSPITAL OUTPT CLINIC VISIT: HCPCS

## 2022-08-16 PROCEDURE — 84165 PROTEIN E-PHORESIS SERUM: CPT | Mod: 26 | Performed by: STUDENT IN AN ORGANIZED HEALTH CARE EDUCATION/TRAINING PROGRAM

## 2022-08-16 PROCEDURE — 258N000003 HC RX IP 258 OP 636: Performed by: STUDENT IN AN ORGANIZED HEALTH CARE EDUCATION/TRAINING PROGRAM

## 2022-08-16 PROCEDURE — 83521 IG LIGHT CHAINS FREE EACH: CPT

## 2022-08-16 PROCEDURE — 82784 ASSAY IGA/IGD/IGG/IGM EACH: CPT

## 2022-08-16 PROCEDURE — 96374 THER/PROPH/DIAG INJ IV PUSH: CPT

## 2022-08-16 RX ORDER — LOSARTAN POTASSIUM 50 MG/1
50 TABLET ORAL DAILY
Qty: 90 TABLET | Refills: 3 | Status: SHIPPED | OUTPATIENT
Start: 2022-08-16 | End: 2023-06-26

## 2022-08-16 RX ADMIN — SODIUM CHLORIDE 250 ML: 9 INJECTION, SOLUTION INTRAVENOUS at 14:35

## 2022-08-16 RX ADMIN — ZOLEDRONIC ACID 3 MG: 4 INJECTION, SOLUTION, CONCENTRATE INTRAVENOUS at 14:35

## 2022-08-16 RX ADMIN — DARATUMUMAB AND HYALURONIDASE-FIHJ (HUMAN RECOMBINANT) 1800 MG: 1800; 30000 INJECTION SUBCUTANEOUS at 14:45

## 2022-08-16 ASSESSMENT — PAIN SCALES - GENERAL: PAINLEVEL: NO PAIN (0)

## 2022-08-16 NOTE — PROGRESS NOTES
Infusion Nursing Note:  June Ronquillo presents today for C8D1 daratumumab-hyaluronidase-fihj (DARZALEX FASPRO) -Zometa.    Patient seen by provider today: YES: Yasmin Johnson PA-C   present during visit today: Not Applicable.    Note: Pt saw provider prior to infusion, ok for treatment.  Pt reports he does not take DEX, provider aware.    Intravenous Access:  Peripheral IV placed.    Treatment Conditions:  Lab Results   Component Value Date    HGB 11.4 (L) 08/16/2022    WBC 5.0 08/16/2022    ANEU 3.0 07/12/2022    ANEUTAUTO 2.2 08/16/2022     08/16/2022      Lab Results   Component Value Date     08/16/2022    POTASSIUM 3.8 08/16/2022    MAG 1.8 05/09/2022    CR 1.23 08/16/2022    HARDEEP 8.6 08/16/2022    BILITOTAL 0.3 08/16/2022    ALBUMIN 4.1 08/16/2022    ALT 24 08/16/2022    AST 24 08/16/2022     Results reviewed, labs MET treatment parameters, ok to proceed with treatment.      Post Infusion Assessment:  Patient tolerated infusion without incident.  Patient tolerated Darzalex Faspro injection into RUQ of abdomen without incident.  Blood return noted pre and post infusion.  Site patent and intact, free from redness, edema or discomfort.  No evidence of extravasations.  Access discontinued per protocol.       Discharge Plan:   Patient declined prescription refills.  Discharge instructions reviewed with: Patient.  Patient and/or family verbalized understanding of discharge instructions and all questions answered.  AVS to patient via [employer].  Patient will return 9/9 for next appointment, IB to pharmacy to verify ok to give 5 days early next cycle.   Patient discharged in stable condition accompanied by: self.  Departure Mode: Ambulatory.    Dalia Montoya RN

## 2022-08-16 NOTE — NURSING NOTE
Chief Complaint   Patient presents with     Oncology Clinic Visit     MULTIPLE MYELOMA      Blood Draw     Labs drawn from PIV placed by RN. Line flushed with saline. Vitals taken. Pt checked in for appointment(s).      Karla MEANS RN PHN BSN  BMT/Oncology Lab

## 2022-08-16 NOTE — NURSING NOTE
"Oncology Rooming Note    August 16, 2022 1:12 PM   June Ronquillo is a 52 year old male who presents for:    Chief Complaint   Patient presents with     Oncology Clinic Visit     MULTIPLE MYELOMA      Blood Draw     Labs drawn from PIV placed by RN. Line flushed with saline. Vitals taken. Pt checked in for appointment(s).      Initial Vitals: BP (!) 151/84   Pulse 81   Temp 97.9  F (36.6  C) (Oral)   Resp 16   Wt 70.8 kg (156 lb)   SpO2 99%   BMI 25.18 kg/m   Estimated body mass index is 25.18 kg/m  as calculated from the following:    Height as of 6/24/22: 1.676 m (5' 6\").    Weight as of this encounter: 70.8 kg (156 lb). Body surface area is 1.82 meters squared.  No Pain (0) Comment: Data Unavailable   No LMP for male patient.  Allergies reviewed: Yes  Medications reviewed: Yes    Medications: Medication refills not needed today.  Pharmacy name entered into [employer]:    [facility] [place], [place] - [address]  [facility] - [place], [place] - [address] [facility] [place], [place] - [phone]    Clinical concerns: Pt has been having a cough and sinuses with a lot of drainage since around July 4th. Ears also tend to hurt. No other symptoms associated with it. Medication that was prescribed hasn't been much help.      Eleanor Gil, [facility]            "

## 2022-08-17 LAB
IGA SERPL-MCNC: 56 MG/DL (ref 84–499)
IGG SERPL-MCNC: 332 MG/DL (ref 610–1616)
IGM SERPL-MCNC: 21 MG/DL (ref 35–242)
KAPPA LC FREE SER-MCNC: 0.78 MG/DL (ref 0.33–1.94)
KAPPA LC FREE/LAMBDA FREE SER NEPH: 1.86 {RATIO} (ref 0.26–1.65)
LAMBDA LC FREE SERPL-MCNC: 0.42 MG/DL (ref 0.57–2.63)

## 2022-08-18 ENCOUNTER — ANCILLARY PROCEDURE (OUTPATIENT)
Dept: GENERAL RADIOLOGY | Facility: CLINIC | Age: 52
End: 2022-08-18
Payer: COMMERCIAL

## 2022-08-18 DIAGNOSIS — R05.9 COUGH: ICD-10-CM

## 2022-08-18 DIAGNOSIS — H65.90 SEROUS OTITIS MEDIA, UNSPECIFIED CHRONICITY, UNSPECIFIED LATERALITY: ICD-10-CM

## 2022-08-18 LAB
ALBUMIN SERPL ELPH-MCNC: 4.2 G/DL (ref 3.7–5.1)
ALPHA1 GLOB SERPL ELPH-MCNC: 0.3 G/DL (ref 0.2–0.4)
ALPHA2 GLOB SERPL ELPH-MCNC: 0.5 G/DL (ref 0.5–0.9)
B-GLOBULIN SERPL ELPH-MCNC: 0.7 G/DL (ref 0.6–1)
GAMMA GLOB SERPL ELPH-MCNC: 0.3 G/DL (ref 0.7–1.6)
M PROTEIN SERPL ELPH-MCNC: 0.1 G/DL
PROT PATTERN SERPL ELPH-IMP: ABNORMAL
PROT PATTERN SERPL IFE-IMP: NORMAL

## 2022-08-18 PROCEDURE — 71046 X-RAY EXAM CHEST 2 VIEWS: CPT | Mod: GC | Performed by: RADIOLOGY

## 2022-08-23 ENCOUNTER — HOME INFUSION (PRE-WILLOW HOME INFUSION) (OUTPATIENT)
Dept: PHARMACY | Facility: CLINIC | Age: 52
End: 2022-08-23

## 2022-08-24 NOTE — TELEPHONE ENCOUNTER
FUTURE VISIT INFORMATION      FUTURE VISIT INFORMATION:    Date: 11/8/22    Time: 2:45PM    Location: [facility]  REFERRAL INFORMATION:    Referring provider:  Yasmin Johnson PA-C    Referring providers clinic:  [facility]     Reason for visit/diagnosis  appt per pt / Serous otitis media / referred by Yasmin Johnson PA-C / med recs in epic / csc confirmed    RECORDS REQUESTED FROM:       Clinic name Comments Records Status Imaging Status   [facility]  8/16/22 note and referral from Yasmin Johnson PA-C EPIC    Imaging  2/19/22 PET   11/23/21 CT Sinus   5/11/21 CT NECK  Epic PACS

## 2022-08-25 NOTE — PROGRESS NOTES
This is a recent snapshot of the patient's [facility] medical record.  For current drug dose and complete information and questions, call [phone] or In Basket pool, fv home infusion ([id number])  CSN Number:  [id number]

## 2022-09-06 DIAGNOSIS — C90.00 MULTIPLE MYELOMA NOT HAVING ACHIEVED REMISSION (H): ICD-10-CM

## 2022-09-07 ENCOUNTER — TELEPHONE (OUTPATIENT)
Dept: ONCOLOGY | Facility: CLINIC | Age: 52
End: 2022-09-07

## 2022-09-07 DIAGNOSIS — C90.00 MULTIPLE MYELOMA NOT HAVING ACHIEVED REMISSION (H): Primary | ICD-10-CM

## 2022-09-07 RX ORDER — LENALIDOMIDE 10 MG/1
10 CAPSULE ORAL DAILY
Qty: 28 CAPSULE | Refills: 0 | Status: SHIPPED | OUTPATIENT
Start: 2022-09-07 | End: 2022-10-11

## 2022-09-07 RX ORDER — POTASSIUM CHLORIDE 750 MG/1
20 TABLET, EXTENDED RELEASE ORAL DAILY
Qty: 60 TABLET | Refills: 3 | Status: SHIPPED | OUTPATIENT
Start: 2022-09-07 | End: 2023-01-16

## 2022-09-07 NOTE — TELEPHONE ENCOUNTER
Oral Chemotherapy Monitoring Program   Medication: Revlimid  Rx: 10mg PO daily on days 1 through 28 of 28 day cycle   Auth #: [id number]   Risk Category: Adult Male  Routine survey questions reviewed.   Rx to be Escribed to [facility]    Yu Goldberg  [facility]  Oncology Pharmacy Liaison   [email or web address]  [phone] (phone)  [phone] (fax)

## 2022-09-12 NOTE — PROGRESS NOTES
[id number] Hx:   - 4/30/2021 M spike of 34.8 in urine.M spike in blood 0.2; IgG 702 with depressed IgA and IgM. Beta 2 microglobulin 2.7. Lambda  with K/L ratio of 0.01. WBC 11.1 with absolute eos of 1.0. hgb, plt, Cr (1.1), and albumin WNL.    -4/30/2021 CT abd/pelvis showed sclerotic marrow changes with numerous lytic appearing lesions throughout the imaged portions of the spine, pelvis and ribs.      -PET scan 5/11/2021 Numerous osteolytic lesions which predominantly demonstrate uptake below liver background levels. There is one intraosseous lesion in the left lateral 9th rib that demonstrates uptake above background, possibly due to nondisplaced fracture. Adjacent to this lesion is mild  hypermetabolism to the left pleura, with new small left pleural effusion, suspicious for malignant effusion versus posttraumatic effusion. Pleural uptake may represent extramedullary myeloma extending along the intercostal space versus inflammation.    - BM bx 5/17/2021 with flow showing 4.0% plasma cells which express CD19 (dim), CD38 (bright), CD45 (dim), , and monotypic cytoplasmic lambda immunoglobulin light chains but lack CD20 and CD56.  Hypercellular bone marrow for age (approximately 75% cellularity) with adequate trilineage hematopoiesis. Plasma cell infiltrate: Interstitial, lambda monotypic and representing approximately 60% the bone marrow cellularity, by  immunohistochemical stain. Cytpgenetics with 2 related hypodiploid clones. One with loss of Y, monosomy 13 and 14 (5%) and one with translocation of 8p and 14, derivative 16 with long arm replaced by extra copy 1q,monosomy 21. FISH showed gain of 1q, loss of 13 and 14, IGH-MYC fusion t(8:14)    - Started Miracle-RVd 21 day with velcade on days 1,8,15.     -Cycle 2 Miracle-RVd. Dose reduce dex to 80 mg d/t fatigue and stomach upset on dex days. Also having cough with basilar streaking on CXR    - 8/31/2021 BM bx -rare suspicious plasma cells in touch  "imprint. No increase in plasma cells by morph.    -8/31/2021 PET/CT Diffuse osteolytic lesions throughout the axial and appendicular skeleton. Increased sclerosis since prior exam 5/11/2021 without increased metabolism or size.  Hypermetabolic pretracheal lymph node as well as hypermetabolic level 2 lymph nodes within the right neck. These lymph nodes are likely reactive from autoimmune activation via medical therapy. Hypermetabolic subcentimeter nodules within the thyroid gland    - 11/11/2021 Autologous BMT    - 2/21/22 started maintenance with Revlimid 10 mg daily and daratumumab monthly; did Revlimid alone for C1 due to low IgG levels, added daratumumab starting C2    Interval Hx:   June presents to clinic prior to daratumumab today. He reports he is feeling well.  Has some persistent sinus drainage; has tested negative for COVID.  He thinks it could be secondary to allergies or frequent movement between air conditioned environments.  He has not had a recent fever or chills. Energy is stable. No bladder or bowel concerns. No chest pain or shortness of breath.. No rashes. No headaches. No night sweats. No new bone pain. No sinus pressure.       Physical Exam:  /64 (BP Location: Left arm, Patient Position: Sitting, Cuff Size: Adult Regular)   Pulse 82   Temp 98  F (36.7  C) (Oral)   Resp 16   Ht 1.651 m (5' 5\")   Wt 71.2 kg (156 lb 14.4 oz)   SpO2 99%   BMI 26.11 kg/m     Gen: alert, pleasant and conversational, NAD  HEENT: NC/AT,EOMI w/ PERRL, anicteric sclera. OP clear. MMM.   Neck: Supple, no LAD  CV: normal S1,S2 with RRR no m/r/g  Resp: lungs CTA bilaterally with adequate air movement to bases. No wheezes or crackles  Abd: soft NTND no organomegaly or masses. BS normoactive.   Ext: warm and well perfused, no edema or cyanosis  Skin: no concerning lesions or rashes  Neuro: A&Ox4, no lateralizing sx. Grossly nonfocal.  Psych: appropriate, reactive        Labs:  I personally reviewed the " following labs:    Most Recent 3 CBC's:  Recent Labs   Lab Test 09/13/22  1306 08/16/22  1306 07/15/22  1440   WBC 4.7 5.0 5.6   HGB 10.6* 11.4* 11.0*   MCV 90 92 92    327 346     Most Recent 3 BMP's:  Recent Labs   Lab Test 09/13/22  1306 08/16/22  1306 07/15/22  1440    138 137   POTASSIUM 3.7 3.8 3.4   CHLORIDE 105 108 110*   CO2 22 22 22   BUN 10.0 10 10   CR 1.27* 1.23 1.01   ANIONGAP 11 8 5   HARDEEP 8.4* 8.6 8.5   * 104* 126*     Most Recent 2 LFT's:  Recent Labs   Lab Test 09/13/22  1306 08/16/22  1306   AST 25 24   ALT 13 24   ALKPHOS 51 56   BILITOTAL 0.2 0.3       Assessment and Plan  Logan has multiple myeloma with high risk cytogenetics. He got Miracle-RVD with VGPR, then underwent autologous transplant 11/11/21. Continue acyclovir daily for viral ppx and bactrim M/T for PJP ppx. Given his high risk cytogenetics he started miracle + revlimid maintenance therapy 2/21/22. Follow IgG and replete if it remains <300. He had severe flu-like effects from his first dose of Zometa but he has been tolerating monthly Zometa dosed at 3 mg. Continue Vit D. Continue B vitamin supplement to help his neuropathy.   - will proceed with miracle + revlimid today, labs stable.   - Proceed with Zometa.   - Myeloma labs pending today.     Elevated serum creatinine  Creatinine is slightly elevated above baseline. Myeloma labs pending from today, previous labs have been stable. He has been taking decongestants for persistent nasal drainage; recommend limiting decongestant doses to 1-2 times weekly until creatinine improves.  Encouraged him to increase oral fluid intake.    41 minutes spent on the date of the encounter doing chart review, review of test results, interpretation of tests, patient visit and documentation     PATTY Solis CNP  [facility] [address] [address] [phone]

## 2022-09-13 ENCOUNTER — APPOINTMENT (OUTPATIENT)
Dept: LAB | Facility: CLINIC | Age: 52
End: 2022-09-13
Attending: REGISTERED NURSE
Payer: COMMERCIAL

## 2022-09-13 ENCOUNTER — INFUSION THERAPY VISIT (OUTPATIENT)
Dept: ONCOLOGY | Facility: CLINIC | Age: 52
End: 2022-09-13
Attending: REGISTERED NURSE
Payer: COMMERCIAL

## 2022-09-13 VITALS
BODY MASS INDEX: 26.14 KG/M2 | DIASTOLIC BLOOD PRESSURE: 64 MMHG | TEMPERATURE: 98 F | SYSTOLIC BLOOD PRESSURE: 115 MMHG | OXYGEN SATURATION: 99 % | HEART RATE: 82 BPM | WEIGHT: 156.9 LBS | RESPIRATION RATE: 16 BRPM | HEIGHT: 65 IN

## 2022-09-13 DIAGNOSIS — C90.00 MULTIPLE MYELOMA NOT HAVING ACHIEVED REMISSION (H): Primary | ICD-10-CM

## 2022-09-13 DIAGNOSIS — C90.00 MULTIPLE MYELOMA NOT HAVING ACHIEVED REMISSION (H): ICD-10-CM

## 2022-09-13 DIAGNOSIS — I25.10 CVD (CARDIOVASCULAR DISEASE): ICD-10-CM

## 2022-09-13 DIAGNOSIS — R79.89 ELEVATED SERUM CREATININE: Primary | ICD-10-CM

## 2022-09-13 LAB
ALBUMIN SERPL BCG-MCNC: 4.3 G/DL (ref 3.5–5.2)
ALP SERPL-CCNC: 51 U/L (ref 40–129)
ALT SERPL W P-5'-P-CCNC: 13 U/L (ref 10–50)
ANION GAP SERPL CALCULATED.3IONS-SCNC: 11 MMOL/L (ref 7–15)
AST SERPL W P-5'-P-CCNC: 25 U/L (ref 10–50)
BASOPHILS # BLD AUTO: 0 10E3/UL (ref 0–0.2)
BASOPHILS NFR BLD AUTO: 1 %
BILIRUB SERPL-MCNC: 0.2 MG/DL
BUN SERPL-MCNC: 10 MG/DL (ref 6–20)
CALCIUM SERPL-MCNC: 8.4 MG/DL (ref 8.6–10)
CHLORIDE SERPL-SCNC: 105 MMOL/L (ref 98–107)
CREAT SERPL-MCNC: 1.27 MG/DL (ref 0.67–1.17)
DEPRECATED HCO3 PLAS-SCNC: 22 MMOL/L (ref 22–29)
EOSINOPHIL # BLD AUTO: 0.2 10E3/UL (ref 0–0.7)
EOSINOPHIL NFR BLD AUTO: 3 %
ERYTHROCYTE [DISTWIDTH] IN BLOOD BY AUTOMATED COUNT: 15.6 % (ref 10–15)
GFR SERPL CREATININE-BSD FRML MDRD: 67 ML/MIN/1.73M2
GLUCOSE SERPL-MCNC: 129 MG/DL (ref 70–99)
HCT VFR BLD AUTO: 32.8 % (ref 40–53)
HGB BLD-MCNC: 10.6 G/DL (ref 13.3–17.7)
IMM GRANULOCYTES # BLD: 0 10E3/UL
IMM GRANULOCYTES NFR BLD: 0 %
LYMPHOCYTES # BLD AUTO: 1.6 10E3/UL (ref 0.8–5.3)
LYMPHOCYTES NFR BLD AUTO: 34 %
MCH RBC QN AUTO: 29.2 PG (ref 26.5–33)
MCHC RBC AUTO-ENTMCNC: 32.3 G/DL (ref 31.5–36.5)
MCV RBC AUTO: 90 FL (ref 78–100)
MONOCYTES # BLD AUTO: 0.5 10E3/UL (ref 0–1.3)
MONOCYTES NFR BLD AUTO: 11 %
NEUTROPHILS # BLD AUTO: 2.4 10E3/UL (ref 1.6–8.3)
NEUTROPHILS NFR BLD AUTO: 51 %
NRBC # BLD AUTO: 0 10E3/UL
NRBC BLD AUTO-RTO: 0 /100
PLATELET # BLD AUTO: 306 10E3/UL (ref 150–450)
POTASSIUM SERPL-SCNC: 3.7 MMOL/L (ref 3.4–5.3)
PROT SERPL-MCNC: 6 G/DL (ref 6.4–8.3)
RBC # BLD AUTO: 3.63 10E6/UL (ref 4.4–5.9)
SODIUM SERPL-SCNC: 138 MMOL/L (ref 136–145)
TOTAL PROTEIN SERUM FOR ELP: 5.8 G/DL (ref 6.4–8.3)
WBC # BLD AUTO: 4.7 10E3/UL (ref 4–11)

## 2022-09-13 PROCEDURE — G0463 HOSPITAL OUTPT CLINIC VISIT: HCPCS

## 2022-09-13 PROCEDURE — 84165 PROTEIN E-PHORESIS SERUM: CPT | Mod: TC | Performed by: STUDENT IN AN ORGANIZED HEALTH CARE EDUCATION/TRAINING PROGRAM

## 2022-09-13 PROCEDURE — 85025 COMPLETE CBC W/AUTO DIFF WBC: CPT

## 2022-09-13 PROCEDURE — 36415 COLL VENOUS BLD VENIPUNCTURE: CPT

## 2022-09-13 PROCEDURE — 86334 IMMUNOFIX E-PHORESIS SERUM: CPT | Performed by: STUDENT IN AN ORGANIZED HEALTH CARE EDUCATION/TRAINING PROGRAM

## 2022-09-13 PROCEDURE — 83521 IG LIGHT CHAINS FREE EACH: CPT

## 2022-09-13 PROCEDURE — 80053 COMPREHEN METABOLIC PANEL: CPT

## 2022-09-13 PROCEDURE — 99215 OFFICE O/P EST HI 40 MIN: CPT | Performed by: REGISTERED NURSE

## 2022-09-13 PROCEDURE — 96374 THER/PROPH/DIAG INJ IV PUSH: CPT

## 2022-09-13 PROCEDURE — 86334 IMMUNOFIX E-PHORESIS SERUM: CPT | Mod: 26 | Performed by: STUDENT IN AN ORGANIZED HEALTH CARE EDUCATION/TRAINING PROGRAM

## 2022-09-13 PROCEDURE — 96401 CHEMO ANTI-NEOPL SQ/IM: CPT

## 2022-09-13 PROCEDURE — 84165 PROTEIN E-PHORESIS SERUM: CPT | Mod: 26 | Performed by: STUDENT IN AN ORGANIZED HEALTH CARE EDUCATION/TRAINING PROGRAM

## 2022-09-13 PROCEDURE — 250N000011 HC RX IP 250 OP 636: Performed by: STUDENT IN AN ORGANIZED HEALTH CARE EDUCATION/TRAINING PROGRAM

## 2022-09-13 PROCEDURE — 84155 ASSAY OF PROTEIN SERUM: CPT | Mod: 91

## 2022-09-13 PROCEDURE — 82784 ASSAY IGA/IGD/IGG/IGM EACH: CPT

## 2022-09-13 RX ORDER — ROSUVASTATIN CALCIUM 40 MG/1
40 TABLET, COATED ORAL DAILY
Qty: 90 TABLET | Refills: 3 | Status: SHIPPED | OUTPATIENT
Start: 2022-09-13 | End: 2023-10-23

## 2022-09-13 RX ORDER — ACYCLOVIR 800 MG/1
800 TABLET ORAL 2 TIMES DAILY
Qty: 180 TABLET | Refills: 1 | Status: SHIPPED | OUTPATIENT
Start: 2022-09-13 | End: 2023-03-17

## 2022-09-13 RX ORDER — ZOLEDRONIC ACID 0.04 MG/ML
4 INJECTION, SOLUTION INTRAVENOUS ONCE
Status: COMPLETED | OUTPATIENT
Start: 2022-09-13 | End: 2022-09-13

## 2022-09-13 RX ADMIN — DARATUMUMAB AND HYALURONIDASE-FIHJ (HUMAN RECOMBINANT) 1800 MG: 1800; 30000 INJECTION SUBCUTANEOUS at 14:44

## 2022-09-13 RX ADMIN — ZOLEDRONIC ACID 4 MG: 0.04 INJECTION, SOLUTION INTRAVENOUS at 14:24

## 2022-09-13 ASSESSMENT — PAIN SCALES - GENERAL: PAINLEVEL: NO PAIN (0)

## 2022-09-13 NOTE — PROGRESS NOTES
Infusion Nursing Note:  June Ronquillo presents today for Cycle 9 Day 1 Zometa and Darzalex.  Patient seen by provider today: Yes: Laura Bustamante CNP     Note: Patient presents to the infusion center today after his provider appt.     NAVIN Bustamante CNP/Lina Bloom, RN 9/13/2 8528  -Please let him know his creatinine is slightly more elevated; I would like him to push oral fluids and hold off on decongestants for at least a week or two (limit decongestants to 1-2 doses weekly).    Intravenous Access:  Peripheral IV placed.    Treatment Conditions:   Latest Reference Range & Units 09/13/22 13:06   Sodium 136 - 145 mmol/L 138   Potassium 3.4 - 5.3 mmol/L 3.7   Chloride 98 - 107 mmol/L 105   Carbon Dioxide (CO2) 22 - 29 mmol/L 22   Urea Nitrogen 6.0 - 20.0 mg/dL 10.0   Creatinine 0.67 - 1.17 mg/dL 1.27 (H)   GFR Estimate >60 mL/min/1.73m2 67   Calcium 8.6 - 10.0 mg/dL 8.4 (L)   Anion Gap 7 - 15 mmol/L 11   Albumin 3.5 - 5.2 g/dL 4.3   Protein Total 6.4 - 8.3 g/dL 6.0 (L)   Alkaline Phosphatase 40 - 129 U/L 51   ALT 10 - 50 U/L 13   AST 10 - 50 U/L 25   Bilirubin Total <=1.2 mg/dL 0.2   Glucose 70 - 99 mg/dL 129 (H)   WBC 4.0 - 11.0 10e3/uL 4.7   Hemoglobin 13.3 - 17.7 g/dL 10.6 (L)   Hematocrit 40.0 - 53.0 % 32.8 (L)   Platelet Count 150 - 450 10e3/uL 306   RBC Count 4.40 - 5.90 10e6/uL 3.63 (L)   MCV 78 - 100 fL 90   MCH 26.5 - 33.0 pg 29.2   MCHC 31.5 - 36.5 g/dL 32.3   RDW 10.0 - 15.0 % 15.6 (H)   % Neutrophils % 51   % Lymphocytes % 34   % Monocytes % 11   % Eosinophils % 3   % Basophils % 1   Absolute Basophils 0.0 - 0.2 10e3/uL 0.0   Absolute Eosinophils 0.0 - 0.7 10e3/uL 0.2   Absolute Immature Granulocytes <=0.4 10e3/uL 0.0   Absolute Lymphocytes 0.8 - 5.3 10e3/uL 1.6   Absolute Monocytes 0.0 - 1.3 10e3/uL 0.5   % Immature Granulocytes % 0   Absolute Neutrophils 1.6 - 8.3 10e3/uL 2.4   Absolute NRBCs 10e3/uL 0.0   NRBCs per 100 WBC <1 /100 0     Results reviewed, labs MET treatment parameters, ok to  proceed with treatment.    Post Infusion Assessment:  Patient tolerated infusion without incident.  Patient tolerated injection without incident in left lower abd.  Blood return noted pre and post infusion.  Site patent and intact, free from redness, edema or discomfort.  No evidence of extravasations.  Access discontinued per protocol.     Discharge Plan:   Patient declined prescription refills.  Discharge instructions reviewed with: Patient.  Patient and/or family verbalized understanding of discharge instructions and all questions answered.  AVS to patient via [employer].  Patient will return 10/11 for next appointment.   Patient discharged in stable condition accompanied by: self.  Departure Mode: Ambulatory.      Lina Bloom RN

## 2022-09-13 NOTE — PATIENT INSTRUCTIONS
[facility] Triage and after hours / weekends / holidays:  [phone]    Please call the triage or after hours line if you experience a temperature greater than or equal to 100.4, shaking chills, have uncontrolled nausea, vomiting and/or diarrhea, dizziness, shortness of breath, chest pain, bleeding, unexplained bruising, or if you have any other new/concerning symptoms, questions or concerns.      If you are having any concerning symptoms or wish to speak to a provider before your next infusion visit, please call your care coordinator or triage to notify them so we can adequately serve you.     If you need a refill on a narcotic prescription or other medication, please call before your infusion appointment.                September 2022 Sunday Monday Tuesday Wednesday Thursday Friday Saturday                       1     2     3       4     5     6     7     8     9     10       11     12     13    LAB PERIPHERAL  12:45 PM   (15 min.)   [facility]   [facility]    RETURN   1:00 PM   (45 min.)   Laura Bustamante, APRN CNP   [facility]    ONC INFUSION 2 HR (120 MIN)   2:00 PM   (120 min.)    ONC INFUSION NURSE   [facility] 14     15     16     17       18     19     20     21     22     23     24       25     26     27     28     29     30                        October 2022 Sunday Monday Tuesday Wednesday Thursday Friday Saturday                                 1       2     3     4     5     6     7     8       9     10     11    NEW  10:45 AM   (15 min.)   Costa Mccoy MD   [facility]    LAB PERIPHERAL   2:30 PM   (15 min.)    [facility]   [facility]    RETURN   2:45 PM   (30 min.)   Barbi Vazquez MD   [facility]    ONC INFUSION 1 HR (60 MIN)   3:30 PM   (60 min.)    ONC INFUSION NURSE   [facility]  [facility] 12     13     14     15       16     17     18     19     20     21     22       23     24     25     26     27     28     29       30     31                                                 Recent Results (from the past 24 hour(s))   Comprehensive metabolic panel    Collection Time: 09/13/22  1:06 PM   Result Value Ref Range    Sodium 138 136 - 145 mmol/L    Potassium 3.7 3.4 - 5.3 mmol/L    Chloride 105 98 - 107 mmol/L    Carbon Dioxide (CO2) 22 22 - 29 mmol/L    Anion Gap 11 7 - 15 mmol/L    Urea Nitrogen 10.0 6.0 - 20.0 mg/dL    Creatinine 1.27 (H) 0.67 - 1.17 mg/dL    Calcium 8.4 (L) 8.6 - 10.0 mg/dL    Glucose 129 (H) 70 - 99 mg/dL    Alkaline Phosphatase 51 40 - 129 U/L    AST 25 10 - 50 U/L    ALT 13 10 - 50 U/L    Protein Total 6.0 (L) 6.4 - 8.3 g/dL    Albumin 4.3 3.5 - 5.2 g/dL    Bilirubin Total 0.2 <=1.2 mg/dL    GFR Estimate 67 >60 mL/min/1.73m2   CBC with platelets and differential    Collection Time: 09/13/22  1:06 PM   Result Value Ref Range    WBC Count 4.7 4.0 - 11.0 10e3/uL    RBC Count 3.63 (L) 4.40 - 5.90 10e6/uL    Hemoglobin 10.6 (L) 13.3 - 17.7 g/dL    Hematocrit 32.8 (L) 40.0 - 53.0 %    MCV 90 78 - 100 fL    MCH 29.2 26.5 - 33.0 pg    MCHC 32.3 31.5 - 36.5 g/dL    RDW 15.6 (H) 10.0 - 15.0 %    Platelet Count 306 150 - 450 10e3/uL    % Neutrophils 51 %    % Lymphocytes 34 %    % Monocytes 11 %    % Eosinophils 3 %    % Basophils 1 %    % Immature Granulocytes 0 %    NRBCs per 100 WBC 0 <1 /100    Absolute Neutrophils 2.4 1.6 - 8.3 10e3/uL    Absolute Lymphocytes 1.6 0.8 - 5.3 10e3/uL    Absolute Monocytes 0.5 0.0 - 1.3 10e3/uL    Absolute Eosinophils 0.2 0.0 - 0.7 10e3/uL    Absolute Basophils 0.0 0.0 - 0.2 10e3/uL    Absolute Immature Granulocytes 0.0 <=0.4 10e3/uL    Absolute NRBCs 0.0 10e3/uL

## 2022-09-13 NOTE — NURSING NOTE
Chief Complaint   Patient presents with     Blood Draw     Vitals taken, PIV started, labs drawn, saline locked, checked into next appt     /64 (BP Location: Left arm, Patient Position: Sitting, Cuff Size: Adult Regular)   Pulse 82   Temp 98  F (36.7  C) (Oral)   Resp 16   Wt 71.2 kg (156 lb 14.4 oz)   SpO2 99%   BMI 25.32 kg/m    Ramsey Baum RN on 9/13/2022 at 1:09 PM

## 2022-09-13 NOTE — TELEPHONE ENCOUNTER
Will refill his Crestor 40 MG as he will get his lipids done in 2-3 at his Oncology blood draw appointment.

## 2022-09-13 NOTE — LETTER
9/13/2022         RE: June Ronquillo  [address]  [facility] [address]        Dear Colleague,    Thank you for referring your patient, June Ronquillo, to the [facility]. Please see a copy of my visit note below.    [id number] Hx:   - 4/30/2021 M spike of 34.8 in urine.M spike in blood 0.2; IgG 702 with depressed IgA and IgM. Beta 2 microglobulin 2.7. Lambda  with K/L ratio of 0.01. WBC 11.1 with absolute eos of 1.0. hgb, plt, Cr (1.1), and albumin WNL.    -4/30/2021 CT abd/pelvis showed sclerotic marrow changes with numerous lytic appearing lesions throughout the imaged portions of the spine, pelvis and ribs.      -PET scan 5/11/2021 Numerous osteolytic lesions which predominantly demonstrate uptake below liver background levels. There is one intraosseous lesion in the left lateral 9th rib that demonstrates uptake above background, possibly due to nondisplaced fracture. Adjacent to this lesion is mild  hypermetabolism to the left pleura, with new small left pleural effusion, suspicious for malignant effusion versus posttraumatic effusion. Pleural uptake may represent extramedullary myeloma extending along the intercostal space versus inflammation.    - BM bx 5/17/2021 with flow showing 4.0% plasma cells which express CD19 (dim), CD38 (bright), CD45 (dim), , and monotypic cytoplasmic lambda immunoglobulin light chains but lack CD20 and CD56.  Hypercellular bone marrow for age (approximately 75% cellularity) with adequate trilineage hematopoiesis. Plasma cell infiltrate: Interstitial, lambda monotypic and representing approximately 60% the bone marrow cellularity, by  immunohistochemical stain. Cytpgenetics with 2 related hypodiploid clones. One with loss of Y, monosomy 13 and 14 (5%) and one with translocation of 8p and 14, derivative 16 with long arm replaced by extra copy 1q,monosomy 21. FISH showed gain of 1q, loss of 13 and 14, IGH-MYC fusion  "t(8:14)    - Started Miracle-RVd 21 day with velcade on days 1,8,15.     -Cycle 2 Miracle-RVd. Dose reduce dex to 80 mg d/t fatigue and stomach upset on dex days. Also having cough with basilar streaking on CXR    - 8/31/2021 BM bx -rare suspicious plasma cells in touch imprint. No increase in plasma cells by morph.    -8/31/2021 PET/CT Diffuse osteolytic lesions throughout the axial and appendicular skeleton. Increased sclerosis since prior exam 5/11/2021 without increased metabolism or size.  Hypermetabolic pretracheal lymph node as well as hypermetabolic level 2 lymph nodes within the right neck. These lymph nodes are likely reactive from autoimmune activation via medical therapy. Hypermetabolic subcentimeter nodules within the thyroid gland    - 11/11/2021 Autologous BMT    - 2/21/22 started maintenance with Revlimid 10 mg daily and daratumumab monthly; did Revlimid alone for C1 due to low IgG levels, added daratumumab starting C2    Interval Hx:   June presents to clinic prior to daratumumab today. He reports he is feeling well.  Has some persistent sinus drainage; has tested negative for COVID.  He thinks it could be secondary to allergies or frequent movement between air conditioned environments.  He has not had a recent fever or chills. Energy is stable. No bladder or bowel concerns. No chest pain or shortness of breath.. No rashes. No headaches. No night sweats. No new bone pain. No sinus pressure.       Physical Exam:  /64 (BP Location: Left arm, Patient Position: Sitting, Cuff Size: Adult Regular)   Pulse 82   Temp 98  F (36.7  C) (Oral)   Resp 16   Ht 1.651 m (5' 5\")   Wt 71.2 kg (156 lb 14.4 oz)   SpO2 99%   BMI 26.11 kg/m     Gen: alert, pleasant and conversational, NAD  HEENT: NC/AT,EOMI w/ PERRL, anicteric sclera. OP clear. MMM.   Neck: Supple, no LAD  CV: normal S1,S2 with RRR no m/r/g  Resp: lungs CTA bilaterally with adequate air movement to bases. No wheezes or crackles  Abd: soft " NTND no organomegaly or masses. BS normoactive.   Ext: warm and well perfused, no edema or cyanosis  Skin: no concerning lesions or rashes  Neuro: A&Ox4, no lateralizing sx. Grossly nonfocal.  Psych: appropriate, reactive        Labs:  I personally reviewed the following labs:    Most Recent 3 CBC's:  Recent Labs   Lab Test 09/13/22  1306 08/16/22  1306 07/15/22  1440   WBC 4.7 5.0 5.6   HGB 10.6* 11.4* 11.0*   MCV 90 92 92    327 346     Most Recent 3 BMP's:  Recent Labs   Lab Test 09/13/22  1306 08/16/22  1306 07/15/22  1440    138 137   POTASSIUM 3.7 3.8 3.4   CHLORIDE 105 108 110*   CO2 22 22 22   BUN 10.0 10 10   CR 1.27* 1.23 1.01   ANIONGAP 11 8 5   HARDEEP 8.4* 8.6 8.5   * 104* 126*     Most Recent 2 LFT's:  Recent Labs   Lab Test 09/13/22  1306 08/16/22  1306   AST 25 24   ALT 13 24   ALKPHOS 51 56   BILITOTAL 0.2 0.3       Assessment and Plan  Logan has multiple myeloma with high risk cytogenetics. He got Vidya-RVD with VGPR, then underwent autologous transplant 11/11/21. Continue acyclovir daily for viral ppx and bactrim M/T for PJP ppx. Given his high risk cytogenetics he started vidya + revlimid maintenance therapy 2/21/22. Follow IgG and replete if it remains <300. He had severe flu-like effects from his first dose of Zometa but he has been tolerating monthly Zometa dosed at 3 mg. Continue Vit D. Continue B vitamin supplement to help his neuropathy.   - will proceed with vidya + revlimid today, labs stable.   - Proceed with Zometa.   - Myeloma labs pending today.     Elevated serum creatinine  Creatinine is slightly elevated above baseline. Myeloma labs pending from today, previous labs have been stable. He has been taking decongestants for persistent nasal drainage; recommend limiting decongestant doses to 1-2 times weekly until creatinine improves.  Encouraged him to increase oral fluid intake.    41 minutes spent on the date of the encounter doing chart review, review of test results,  interpretation of tests, patient visit and documentation         Again, thank you for allowing me to participate in the care of your patient.      Sincerely,    PATTY Solis CNP

## 2022-09-13 NOTE — NURSING NOTE
"Oncology Rooming Note    September 13, 2022 1:19 PM   June Ronquillo is a 52 year old male who presents for:    Chief Complaint   Patient presents with     Blood Draw     Vitals taken, PIV started, labs drawn, saline locked, checked into next appt     Oncology Clinic Visit     UMP RETURN - MULTIPLE MYELOMA     Initial Vitals: /64 (BP Location: Left arm, Patient Position: Sitting, Cuff Size: Adult Regular)   Pulse 82   Temp 98  F (36.7  C) (Oral)   Resp 16   Ht 1.651 m (5' 5\")   Wt 71.2 kg (156 lb 14.4 oz)   SpO2 99%   BMI 26.11 kg/m   Estimated body mass index is 26.11 kg/m  as calculated from the following:    Height as of this encounter: 1.651 m (5' 5\").    Weight as of this encounter: 71.2 kg (156 lb 14.4 oz). Body surface area is 1.81 meters squared.  No Pain (0) Comment: Data Unavailable   No LMP for male patient.  Allergies reviewed: Yes  Medications reviewed: Yes    Medications: Medication refills not needed today.  Pharmacy name entered into [employer]:    [facility] [place], [place] - [address]  [facility] - [place], [place] - [address] [place] [address] SE  [facility] [place], [place] - [phone]    Clinical concerns: No new concerns. Dianna was notified.      Juan Saenz LPN            "

## 2022-09-14 LAB
ALBUMIN SERPL ELPH-MCNC: 4 G/DL (ref 3.7–5.1)
ALPHA1 GLOB SERPL ELPH-MCNC: 0.3 G/DL (ref 0.2–0.4)
ALPHA2 GLOB SERPL ELPH-MCNC: 0.5 G/DL (ref 0.5–0.9)
B-GLOBULIN SERPL ELPH-MCNC: 0.7 G/DL (ref 0.6–1)
GAMMA GLOB SERPL ELPH-MCNC: 0.3 G/DL (ref 0.7–1.6)
IGA SERPL-MCNC: 56 MG/DL (ref 84–499)
IGG SERPL-MCNC: 290 MG/DL (ref 610–1616)
IGM SERPL-MCNC: 16 MG/DL (ref 35–242)
KAPPA LC FREE SER-MCNC: 0.87 MG/DL (ref 0.33–1.94)
KAPPA LC FREE/LAMBDA FREE SER NEPH: 1.85 {RATIO} (ref 0.26–1.65)
LAMBDA LC FREE SERPL-MCNC: 0.47 MG/DL (ref 0.57–2.63)
M PROTEIN SERPL ELPH-MCNC: 0.1 G/DL
PROT PATTERN SERPL ELPH-IMP: ABNORMAL
PROT PATTERN SERPL IFE-IMP: NORMAL

## 2022-09-20 ENCOUNTER — MYC MEDICAL ADVICE (OUTPATIENT)
Dept: FAMILY MEDICINE | Facility: CLINIC | Age: 52
End: 2022-09-20

## 2022-09-29 DIAGNOSIS — C90.00 MULTIPLE MYELOMA NOT HAVING ACHIEVED REMISSION (H): Primary | ICD-10-CM

## 2022-10-05 DIAGNOSIS — C90.00 MULTIPLE MYELOMA NOT HAVING ACHIEVED REMISSION (H): Primary | ICD-10-CM

## 2022-10-05 RX ORDER — LENALIDOMIDE 10 MG/1
10 CAPSULE ORAL DAILY
Qty: 28 CAPSULE | Refills: 0 | Status: SHIPPED | OUTPATIENT
Start: 2022-10-05 | End: 2023-04-11

## 2022-10-06 ENCOUNTER — TELEPHONE (OUTPATIENT)
Dept: ONCOLOGY | Facility: CLINIC | Age: 52
End: 2022-10-06

## 2022-10-06 NOTE — TELEPHONE ENCOUNTER
Oral Chemotherapy Monitoring Program   Medication: Revlimid  Rx: 15mg PO daily on days 1 through 28 of 28 day cycle   Auth #: [id number]   Risk Category: Adult Male  Routine survey questions reviewed.   Rx to be Escribed to Napoleon Goldberg  [facility]  Oncology Pharmacy Liaison   [email or web address]  [phone] (phone)  [phone] (fax)

## 2022-10-11 ENCOUNTER — OFFICE VISIT (OUTPATIENT)
Dept: DERMATOLOGY | Facility: CLINIC | Age: 52
End: 2022-10-11
Attending: INTERNAL MEDICINE
Payer: COMMERCIAL

## 2022-10-11 ENCOUNTER — ONCOLOGY VISIT (OUTPATIENT)
Dept: ONCOLOGY | Facility: CLINIC | Age: 52
End: 2022-10-11
Attending: REGISTERED NURSE
Payer: COMMERCIAL

## 2022-10-11 ENCOUNTER — APPOINTMENT (OUTPATIENT)
Dept: LAB | Facility: CLINIC | Age: 52
End: 2022-10-11
Attending: STUDENT IN AN ORGANIZED HEALTH CARE EDUCATION/TRAINING PROGRAM
Payer: COMMERCIAL

## 2022-10-11 ENCOUNTER — INFUSION THERAPY VISIT (OUTPATIENT)
Dept: ONCOLOGY | Facility: CLINIC | Age: 52
End: 2022-10-11
Attending: STUDENT IN AN ORGANIZED HEALTH CARE EDUCATION/TRAINING PROGRAM
Payer: COMMERCIAL

## 2022-10-11 VITALS
OXYGEN SATURATION: 99 % | BODY MASS INDEX: 26.29 KG/M2 | SYSTOLIC BLOOD PRESSURE: 122 MMHG | WEIGHT: 157.8 LBS | RESPIRATION RATE: 16 BRPM | TEMPERATURE: 97.9 F | HEART RATE: 76 BPM | HEIGHT: 65 IN | DIASTOLIC BLOOD PRESSURE: 72 MMHG

## 2022-10-11 DIAGNOSIS — C90.00 MULTIPLE MYELOMA NOT HAVING ACHIEVED REMISSION (H): Primary | ICD-10-CM

## 2022-10-11 DIAGNOSIS — D23.9 DERMATOFIBROMA: ICD-10-CM

## 2022-10-11 DIAGNOSIS — D23.9 DERMAL NEVUS: ICD-10-CM

## 2022-10-11 DIAGNOSIS — L91.8 SKIN TAG: ICD-10-CM

## 2022-10-11 DIAGNOSIS — L98.9 SKIN LESION: ICD-10-CM

## 2022-10-11 DIAGNOSIS — L82.1 SEBORRHEIC KERATOSIS: Primary | ICD-10-CM

## 2022-10-11 LAB
ALBUMIN SERPL BCG-MCNC: 4.3 G/DL (ref 3.5–5.2)
ALP SERPL-CCNC: 58 U/L (ref 40–129)
ALT SERPL W P-5'-P-CCNC: 15 U/L (ref 10–50)
ANION GAP SERPL CALCULATED.3IONS-SCNC: 10 MMOL/L (ref 7–15)
AST SERPL W P-5'-P-CCNC: 27 U/L (ref 10–50)
BASOPHILS # BLD AUTO: 0.1 10E3/UL (ref 0–0.2)
BASOPHILS NFR BLD AUTO: 1 %
BILIRUB SERPL-MCNC: 0.3 MG/DL
BUN SERPL-MCNC: 8.6 MG/DL (ref 6–20)
CALCIUM SERPL-MCNC: 8.8 MG/DL (ref 8.6–10)
CHLORIDE SERPL-SCNC: 104 MMOL/L (ref 98–107)
CREAT SERPL-MCNC: 1.33 MG/DL (ref 0.67–1.17)
DEPRECATED HCO3 PLAS-SCNC: 22 MMOL/L (ref 22–29)
EOSINOPHIL # BLD AUTO: 0.3 10E3/UL (ref 0–0.7)
EOSINOPHIL NFR BLD AUTO: 6 %
ERYTHROCYTE [DISTWIDTH] IN BLOOD BY AUTOMATED COUNT: 16.5 % (ref 10–15)
GFR SERPL CREATININE-BSD FRML MDRD: 64 ML/MIN/1.73M2
GLUCOSE SERPL-MCNC: 91 MG/DL (ref 70–99)
HCT VFR BLD AUTO: 32 % (ref 40–53)
HGB BLD-MCNC: 10.6 G/DL (ref 13.3–17.7)
IMM GRANULOCYTES # BLD: 0 10E3/UL
IMM GRANULOCYTES NFR BLD: 0 %
LYMPHOCYTES # BLD AUTO: 1.8 10E3/UL (ref 0.8–5.3)
LYMPHOCYTES NFR BLD AUTO: 35 %
MCH RBC QN AUTO: 29.2 PG (ref 26.5–33)
MCHC RBC AUTO-ENTMCNC: 33.1 G/DL (ref 31.5–36.5)
MCV RBC AUTO: 88 FL (ref 78–100)
MONOCYTES # BLD AUTO: 0.6 10E3/UL (ref 0–1.3)
MONOCYTES NFR BLD AUTO: 13 %
NEUTROPHILS # BLD AUTO: 2.3 10E3/UL (ref 1.6–8.3)
NEUTROPHILS NFR BLD AUTO: 45 %
NRBC # BLD AUTO: 0 10E3/UL
NRBC BLD AUTO-RTO: 0 /100
PLATELET # BLD AUTO: 306 10E3/UL (ref 150–450)
POTASSIUM SERPL-SCNC: 3.6 MMOL/L (ref 3.4–5.3)
PROT SERPL-MCNC: 6.1 G/DL (ref 6.4–8.3)
RBC # BLD AUTO: 3.63 10E6/UL (ref 4.4–5.9)
SODIUM SERPL-SCNC: 136 MMOL/L (ref 136–145)
TOTAL PROTEIN SERUM FOR ELP: 5.6 G/DL (ref 6.4–8.3)
WBC # BLD AUTO: 5 10E3/UL (ref 4–11)

## 2022-10-11 PROCEDURE — 85025 COMPLETE CBC W/AUTO DIFF WBC: CPT

## 2022-10-11 PROCEDURE — 36415 COLL VENOUS BLD VENIPUNCTURE: CPT

## 2022-10-11 PROCEDURE — 250N000011 HC RX IP 250 OP 636: Performed by: STUDENT IN AN ORGANIZED HEALTH CARE EDUCATION/TRAINING PROGRAM

## 2022-10-11 PROCEDURE — 86334 IMMUNOFIX E-PHORESIS SERUM: CPT | Performed by: PATHOLOGY

## 2022-10-11 PROCEDURE — 82784 ASSAY IGA/IGD/IGG/IGM EACH: CPT

## 2022-10-11 PROCEDURE — 258N000003 HC RX IP 258 OP 636: Performed by: STUDENT IN AN ORGANIZED HEALTH CARE EDUCATION/TRAINING PROGRAM

## 2022-10-11 PROCEDURE — 96365 THER/PROPH/DIAG IV INF INIT: CPT

## 2022-10-11 PROCEDURE — 80053 COMPREHEN METABOLIC PANEL: CPT

## 2022-10-11 PROCEDURE — 82040 ASSAY OF SERUM ALBUMIN: CPT

## 2022-10-11 PROCEDURE — 84165 PROTEIN E-PHORESIS SERUM: CPT | Mod: TC | Performed by: PATHOLOGY

## 2022-10-11 PROCEDURE — G0463 HOSPITAL OUTPT CLINIC VISIT: HCPCS

## 2022-10-11 PROCEDURE — 83521 IG LIGHT CHAINS FREE EACH: CPT

## 2022-10-11 PROCEDURE — 99214 OFFICE O/P EST MOD 30 MIN: CPT | Performed by: STUDENT IN AN ORGANIZED HEALTH CARE EDUCATION/TRAINING PROGRAM

## 2022-10-11 PROCEDURE — M0220 HC INJECTION TIXAGEVIMAB & CILGAVIMAB (EVUSHELD): HCPCS | Performed by: STUDENT IN AN ORGANIZED HEALTH CARE EDUCATION/TRAINING PROGRAM

## 2022-10-11 PROCEDURE — 84155 ASSAY OF PROTEIN SERUM: CPT

## 2022-10-11 PROCEDURE — 86334 IMMUNOFIX E-PHORESIS SERUM: CPT | Mod: 26

## 2022-10-11 PROCEDURE — 96401 CHEMO ANTI-NEOPL SQ/IM: CPT

## 2022-10-11 PROCEDURE — 99203 OFFICE O/P NEW LOW 30 MIN: CPT | Performed by: DERMATOLOGY

## 2022-10-11 PROCEDURE — 84165 PROTEIN E-PHORESIS SERUM: CPT | Mod: 26

## 2022-10-11 RX ORDER — SULFAMETHOXAZOLE/TRIMETHOPRIM 800-160 MG
1 TABLET ORAL 2 TIMES DAILY
Qty: 16 TABLET | Refills: 3 | Status: SHIPPED | OUTPATIENT
Start: 2022-10-11 | End: 2023-01-09

## 2022-10-11 RX ORDER — SULFAMETHOXAZOLE AND TRIMETHOPRIM 400; 80 MG/1; MG/1
1 TABLET ORAL 2 TIMES DAILY
Qty: 30 TABLET | Refills: 1 | Status: SHIPPED | OUTPATIENT
Start: 2022-10-11 | End: 2022-10-11

## 2022-10-11 RX ORDER — SULFAMETHOXAZOLE/TRIMETHOPRIM 800-160 MG
1 TABLET ORAL DAILY
Qty: 8 TABLET | Refills: 3 | Status: SHIPPED | OUTPATIENT
Start: 2022-10-11 | End: 2022-10-11

## 2022-10-11 RX ADMIN — ZOLEDRONIC ACID 3 MG: 4 INJECTION, SOLUTION, CONCENTRATE INTRAVENOUS at 16:09

## 2022-10-11 RX ADMIN — SODIUM CHLORIDE 250 ML: 9 INJECTION, SOLUTION INTRAVENOUS at 16:09

## 2022-10-11 RX ADMIN — AZD7442 6 ML: KIT at 15:27

## 2022-10-11 RX ADMIN — DARATUMUMAB AND HYALURONIDASE-FIHJ (HUMAN RECOMBINANT) 1800 MG: 1800; 30000 INJECTION SUBCUTANEOUS at 16:00

## 2022-10-11 ASSESSMENT — PAIN SCALES - GENERAL
PAINLEVEL: NO PAIN (0)
PAINLEVEL: NO PAIN (0)

## 2022-10-11 NOTE — LETTER
10/11/2022         RE: June Ronquillo  [address]  [facility] [address]        Dear Colleague,    Thank you for referring your patient, June Ronquillo, to the [facility]. Please see a copy of my visit note below.    Oncologic Hx:   - 4/30/2021 M spike of 34.8 in urine.M spike in blood 0.2; IgG 702 with depressed IgA and IgM. Beta 2 microglobulin 2.7. Lambda  with K/L ratio of 0.01. WBC 11.1 with absolute eos of 1.0. hgb, plt, Cr (1.1), and albumin WNL.    -4/30/2021 CT abd/pelvis showed sclerotic marrow changes with numerous lytic appearing lesions throughout the imaged portions of the spine, pelvis and ribs.      -PET scan 5/11/2021 Numerous osteolytic lesions which predominantly demonstrate uptake below liver background levels. There is one intraosseous lesion in the left lateral 9th rib that demonstrates uptake above background, possibly due to nondisplaced fracture. Adjacent to this lesion is mild  hypermetabolism to the left pleura, with new small left pleural effusion, suspicious for malignant effusion versus posttraumatic effusion. Pleural uptake may represent extramedullary myeloma extending along the intercostal space versus inflammation.    - BM bx 5/17/2021 with flow showing 4.0% plasma cells which express CD19 (dim), CD38 (bright), CD45 (dim), , and monotypic cytoplasmic lambda immunoglobulin light chains but lack CD20 and CD56.  Hypercellular bone marrow for age (approximately 75% cellularity) with adequate trilineage hematopoiesis. Plasma cell infiltrate: Interstitial, lambda monotypic and representing approximately 60% the bone marrow cellularity, by  immunohistochemical stain. Cytpgenetics with 2 related hypodiploid clones. One with loss of Y, monosomy 13 and 14 (5%) and one with translocation of 8p and 14, derivative 16 with long arm replaced by extra copy 1q,monosomy 21. FISH showed gain of 1q, loss of 13 and 14, IGH-MYC fusion  "t(8:14)    - Started Vidya-RVd 21 day with velcade on days 1,8,15.     -Cycle 2 Vidya-RVd. Dose reduce dex to 80 mg d/t fatigue and stomach upset on dex days. Also having cough with basilar streaking on CXR    - 8/31/2021 BM bx -rare suspicious plasma cells in touch imprint. No increase in plasma cells by morph.    -8/31/2021 PET/CT Diffuse osteolytic lesions throughout the axial and appendicular skeleton. Increased sclerosis since prior exam 5/11/2021 without increased metabolism or size.  Hypermetabolic pretracheal lymph node as well as hypermetabolic level 2 lymph nodes within the right neck. These lymph nodes are likely reactive from autoimmune activation via medical therapy. Hypermetabolic subcentimeter nodules within the thyroid gland    - 11/11/2021 Autologous BMT    - 2/21/22 started maintenance with Revlimid 10 mg daily and daratumumab monthly; did Revlimid alone for C1 due to low IgG levels, added daratumumab starting C2    Interval Hx:   June presents to clinic prior to daratumumab today. He is feeling well. His granddaughter is hoping to get a kitten.     Physical Exam:  /72 (BP Location: Left arm, Patient Position: Sitting, Cuff Size: Adult Regular)   Pulse 76   Temp 97.9  F (36.6  C) (Oral)   Resp 16   Ht 1.651 m (5' 5\")   Wt 71.6 kg (157 lb 12.8 oz)   SpO2 99%   BMI 26.26 kg/m      Constitutional: NAD  Eyes: no scleral icterus  ENT: no oral lesions  Lymph: no cervical, supraclavicular, axillary LAD  Pulm: CTAB  CV: RRR, no m/r/g  GI: bowel sounds present, soft and nontender to palpation  MSK: No edema in the extremities  Neuro: alert, conversant, normal gait  Psych: appropriate mood and affect    Assessment and Plan  Logan has multiple myeloma with high risk cytogenetics. He got Vidya-RVD with VGPR, then underwent autologous transplant 11/11/21. Continue acyclovir daily for viral ppx and bactrim M/T for PJP ppx. Given his high risk cytogenetics he started vidya + revlimid maintenance " therapy 2/21/22. Follow IgG and replete if it remains <300. He had severe flu-like effects from his first dose of Zometa but he has been tolerating monthly Zometa dosed at 3 mg. Continue Vit D. Continue B vitamin supplement to help his neuropathy.   - will proceed with vidya + revlimid today, labs stable. Continue zometa    -Evusheld given today    Elevated serum creatinine  Creatinine is slightly elevated above baseline.  Encouraged him to increase oral fluid intake. Will monitor in case revlimid needs to be dose reduced and he needs nephrology consult    Hypogammaglobulinemia Start monthly IV Ig whenever IgG <300    Normocytic Anemia: likely d/t revlimid and daratumumab. Will keep monitoring and dose reduce if needed.      Barbi Vazquez MD PhD        Evusheld Consent   Confirmed patient received the Emergency Use Authorization Fact Sheet for Patients, Parents and Caregivers prior to receiving medication. We discussed the following risks and benefits of receiving EVUSHELD, as well as alternative treatments and the patient wished to proceed with EVUSHELD.     Providers believe the benefits of Evusheld outweigh the risks for all moderately to severely immunocompromised patients.      Benefits:   Evusheld is a synthetic antibody that provides protection against COVID-19 for 6 months and is recommended for patients that have a weakened immune system that may not be able to make antibodies themselves.     Risks:    There is a very small risk of allergic reactions and you should notify us if you have any symptoms of an allergic reaction after the injection. There were also some extremely rare cardiac events reported in the initial trials in people that already had heart disease, but experts do not think these were caused by the medication. We will observe you for any chest pain or trouble breathing after the injections and you can reach out to your provider if any of these symptoms develop.     Alternatives:   Vaccines to  prevent COVID-19 are approved or available under Emergency Use Authorization. Use of EVUSHELD does not replace vaccination against COVID-19. You can continue to mask and isolate to avoid infections. It is your choice to receive or not receive EVUSHELD. Should you decide not to receive EVUSHELD, it will not change your standard medical care.     Patient does consent to the injection.      Again, thank you for allowing me to participate in the care of your patient.      Sincerely,    Barbi Vazquez MD

## 2022-10-11 NOTE — PROGRESS NOTES
Infusion Nursing Note:  June Ronquillo presents today for C10D1 daratumumab-hyaluronidase-fihj (DARZALEX FASPRO) - Zometa.    Patient seen by provider today: Yes: Dr Vazquez   present during visit today: Not Applicable.    Note: Pt saw provider prior to infusion, ok for treatment.      Intravenous Access:  Peripheral IV placed.    Treatment Conditions:  Lab Results   Component Value Date    HGB 10.6 (L) 10/11/2022    WBC 5.0 10/11/2022    ANEU 3.0 07/12/2022    ANEUTAUTO 2.3 10/11/2022     10/11/2022      Lab Results   Component Value Date     10/11/2022    POTASSIUM 3.6 10/11/2022    MAG 1.8 05/09/2022    CR 1.33 (H) 10/11/2022    HARDEEP 8.8 10/11/2022    BILITOTAL 0.3 10/11/2022    ALBUMIN 4.3 10/11/2022    ALT 15 10/11/2022    AST 27 10/11/2022         Post Infusion Assessment:  Patient tolerated infusion without incident.  Patient tolerated Darzalex injection into RUQ of abdomen without incident.  Blood return noted pre and post infusion.  Site patent and intact, free from redness, edema or discomfort.  No evidence of extravasations.  Access discontinued per protocol.       Discharge Plan:   Prescription refills given for Bactrim, to local pharmacy.  Discharge instructions reviewed with: Patient.  Patient and/or family verbalized understanding of discharge instructions and all questions answered.  AVS to patient via [employer].  Checkout orders not done prior to discharge, IB sent to scheduling.  Patient discharged in stable condition accompanied by: self.  Departure Mode: Ambulatory.    Dalia Montoya RN

## 2022-10-11 NOTE — PROGRESS NOTES
June Ronquillo , a 52 year old year old male patient, I was asked to see by MILLIE Ballesteros for spot on left thigh.  Patient states this has been present for a while.  Patient reports the following symptoms:  larger .  Patient reports the following previous treatments none.  Patient reports the following modifying factors none.  Associated symptoms: none.  Patient has no other skin complaints today.  Remainder of the HPI, Meds, PMH, Allergies, FH, and SH was reviewed in chart.      Past Medical History:   Diagnosis Date     CAD (coronary artery disease)     s/p stent to CFX     Heart attack (H)      Hyperlipidemia LDL goal <100      Hypertension      Stented coronary artery      Thyroid disease        Past Surgical History:   Procedure Laterality Date     BONE MARROW BIOPSY, BONE SPECIMEN, NEEDLE/TROCAR Left 5/17/2021    Procedure: BIOPSY, BONE MARROW with aspiration and ancillary studies;  Surgeon: Niharika Regalado MD;  Location: [facility] OR     BONE MARROW BIOPSY, BONE SPECIMEN, NEEDLE/TROCAR Left 10/14/2021    Procedure: BIOPSY, BONE MARROW;  Surgeon: Alexa Albert APRN [facility];  Location: [facility] OR     BONE MARROW BIOPSY, BONE SPECIMEN, NEEDLE/TROCAR Right 3/7/2022    Procedure: BIOPSY, BONE MARROW;  Surgeon: Deborah Carmona PA-C;  Location: [facility] OR     BONE MARROW BIOPSY, BONE SPECIMEN, NEEDLE/TROCAR N/A 5/9/2022    Procedure: BIOPSY, BONE MARROW;  Surgeon: Deborah Leblanc PA-C;  Location: [facility] OR     HEART CATH PTCA SINGLE VESSEL  10/10/2004    Stent to X - [facility]      INSERT CATHETER VASCULAR ACCESS Right 11/3/2021    Procedure: NON VALVED TUNNELED DUAL LUMEN APHARESIS CAPABLE LINE INSERTION, VASCULAR ACCESS CATHETER;  Surgeon: Werner Mcnamara MD;  Location: [facility] OR     IR CVC TUNNEL PLACEMENT > 5 YRS OF AGE  11/3/2021     IR CVC TUNNEL REMOVAL RIGHT  12/2/2021     THYROIDECTOMY N/A 5/3/2022    Procedure: total thyroidectomy, Left selective neck dissection level 6 and level 7 ;   Surgeon: Clarita Sepulveda MD;  [place] [facility] [place]        Family History   Problem Relation Age of Onset     Hypertension Mother      Hyperlipidemia Mother      Heart Disease Paternal Grandmother      Hyperlipidemia Sister      Hyperlipidemia Sister        Social History     Socioeconomic History     Marital status:      Spouse name: Not on file     Number of children: Not on file     Years of education: Not on file     Highest education level: Not on file   Occupational History     Not on file   Tobacco Use     Smoking status: Never     Smokeless tobacco: Never   Substance and Sexual Activity     Alcohol use: Not Currently     Drug use: No     Sexual activity: Yes     Partners: Female   Other Topics Concern     Parent/sibling w/ CABG, MI or angioplasty before 65F 55M? No      Service Not Asked     Blood Transfusions Not Asked     Caffeine Concern No     Occupational Exposure Not Asked     Hobby Hazards Not Asked     Sleep Concern Not Asked     Stress Concern Not Asked     Weight Concern Not Asked     Special Diet No     Back Care Not Asked     Exercise No     Bike Helmet Not Asked     Seat Belt Yes     Self-Exams Not Asked   Social History Narrative     Not on file     Social Determinants of Health     Financial Resource Strain: Not on file   Food Insecurity: Not on file   Transportation Needs: Not on file   Physical Activity: Not on file   Stress: Not on file   Social Connections: Not on file   Intimate Partner Violence: Not At Risk     Fear of Current or Ex-Partner: No     Emotionally Abused: No     Physically Abused: No     Sexually Abused: No   Housing Stability: Not on file       Outpatient Encounter Medications as of 10/11/2022   Medication Sig Dispense Refill     acyclovir (ZOVIRAX) 800 MG tablet Take 1 tablet (800 mg) by mouth 2 times daily 180 tablet 1     aspirin (ASA) 81 MG EC tablet Take 1 tablet (81 mg) by mouth daily       calcium carb-cholecalciferol (OS-HARDEEP) 500-200 MG-UNIT tablet  Take 1 tablet by mouth daily 90 tablet 3     calcium carbonate (TUMS) 500 MG chewable tablet Take 2 tablets (1,000 mg) by mouth 3 times daily 90 tablet 1     fluticasone (FLONASE) 50 MCG/ACT nasal spray Spray 1 spray into both nostrils daily 9.9 mL 0     LENalidomide (REVLIMID) 10 MG CAPS capsule Take 1 capsule (10 mg) by mouth daily 28 capsule 0     LENalidomide (REVLIMID) 10 MG CAPS capsule Take 1 capsule (10 mg) by mouth daily 28 capsule 0     LENalidomide (REVLIMID) 10 MG CAPS capsule Take 1 capsule (10 mg) by mouth daily 28 capsule 0     levothyroxine (SYNTHROID/LEVOTHROID) 125 MCG tablet Take 1 tablet (125 mcg) by mouth daily 90 tablet 1     losartan (COZAAR) 50 MG tablet Take 1 tablet (50 mg) by mouth daily 90 tablet 3     meclizine (ANTIVERT) 25 MG tablet Take 1 tablet (25 mg) by mouth 3 times daily as needed for dizziness 30 tablet 0     potassium chloride ER (KLOR-CON M) 10 MEQ CR tablet Take 2 tablets (20 mEq) by mouth daily 60 tablet 3     rosuvastatin (CRESTOR) 40 MG tablet Take 1 tablet (40 mg) by mouth daily 90 tablet 3     sulfamethoxazole-trimethoprim (BACTRIM) 400-80 MG tablet Take 1 tablet by mouth 2 times daily Mondays and Tuesdays       vitamin B complex with vitamin C (STRESS TAB) tablet Take 1 tablet by mouth daily 90 tablet 3     LENalidomide (REVLIMID) 10 MG CAPS capsule Take 1 capsule (10 mg) by mouth daily 28 capsule 0     No facility-administered encounter medications on file as of 10/11/2022.             Review Of Systems  Skin: As above  Eyes: negative  Ears/Nose/Throat: negative  Respiratory: No shortness of breath, dyspnea on exertion, cough, or hemoptysis  Cardiovascular: negative  Gastrointestinal: negative  Genitourinary: negative  Musculoskeletal: negative  Neurologic: negative  Psychiatric: negative  Hematologic/Lymphatic/Immunologic: negative  Endocrine: negative      O:   NAD, WDWN, Alert & Oriented, Mood & Affect wnl, Vitals stable   Here today alone   General appearance jeri  iii   Vitals stable   Alert, oriented and in no acute distress   L thigh firm brown papule with button hole  Tags on neck    Stuck on papules on trunk and ext      Flesh colored papules on trunk      The remainder of the full exam was normal; the following areas were examined:  conjunctiva/lids, oral mucosa, neck, peripheral vascular system, abdomen, lymph nodes, digits/nails, eccrine and apocrine glands, scalp/hair, face, neck, chest, abdomen, buttocks, back, RUE, LUE, RLE, LLE       Eyes: Conjunctivae/lids:Normal     ENT: Lips, buccal mucosa, tongue: normal    MSK:Normal    Cardiovascular: peripheral edema none    Pulm: Breathing Normal    Lymph Nodes: No Head and Neck Lymphadenopathy     Neuro/Psych: Orientation:Normal; Mood/Affect:Normal      A/P:  1. Seborrheic keratosis, dermatofibroma, dermal nevus, skin tags  It was a pleasure speaking to June Ronquillo today.  Previous clinic  notes and pertinent laboratory tests were reviewed prior to June Ronquillo's visit.  Nature of benign skin lesions dicussed with patient.  Patient encouraged to perform monthly skin exams.  UV precautions reviewed with patient.  Return to clinic 12 months

## 2022-10-11 NOTE — NURSING NOTE
Chief Complaint   Patient presents with     Blood Draw     Vitals taken, [facility], labs drawn, saline locked, checked into next appt     /72 (BP Location: Left arm, Patient Position: Sitting, Cuff Size: Adult Regular)   Pulse 76   Temp 97.9  F (36.6  C) (Oral)   Resp 16   Wt 71.6 kg (157 lb 12.8 oz)   SpO2 99%   BMI 26.26 kg/m    Ramsey Baum RN on 10/11/2022 at 2:20 PM

## 2022-10-11 NOTE — LETTER
10/11/2022         RE: June Ronquillo  [address] [id number]        Dear Colleague,    Thank you for referring your patient, June Ronquillo, to the [facility]. Please see a copy of my visit note below.    June Ronquillo , a 52 year old year old male patient, I was asked to see by MILLIE Ballesteros for spot on left thigh.  Patient states this has been present for a while.  Patient reports the following symptoms:  larger .  Patient reports the following previous treatments none.  Patient reports the following modifying factors none.  Associated symptoms: none.  Patient has no other skin complaints today.  Remainder of the HPI, Meds, PMH, Allergies, FH, and SH was reviewed in chart.      Past Medical History:   Diagnosis Date     CAD (coronary artery disease)     s/p stent to CFX     Heart attack (H)      Hyperlipidemia LDL goal <100      Hypertension      Stented coronary artery      Thyroid disease        Past Surgical History:   Procedure Laterality Date     BONE MARROW BIOPSY, BONE SPECIMEN, NEEDLE/TROCAR Left 5/17/2021    Procedure: BIOPSY, BONE MARROW with aspiration and ancillary studies;  Surgeon: Niharika Regalado MD;  Location: [facility] OR     BONE MARROW BIOPSY, BONE SPECIMEN, NEEDLE/TROCAR Left 10/14/2021    Procedure: BIOPSY, BONE MARROW;  Surgeon: Alexa Albert APRN [facility];  Location: [facility] OR     BONE MARROW BIOPSY, BONE SPECIMEN, NEEDLE/TROCAR Right 3/7/2022    Procedure: BIOPSY, BONE MARROW;  Surgeon: Deborah Carmona PA-C;  Location: [facility] OR     BONE MARROW BIOPSY, BONE SPECIMEN, NEEDLE/TROCAR N/A 5/9/2022    Procedure: BIOPSY, BONE MARROW;  Surgeon: Deborah Leblanc PA-C;  Location: [facility] OR     HEART CATH PTCA SINGLE VESSEL  10/10/2004    Stent to [employer] H[employer] - [employer]      INSERT CATHETER VASCULAR ACCESS Right 11/3/2021    Procedure: NON VALVED TUNNELED DUAL LUMEN APHARESIS CAPABLE LINE INSERTION, VASCULAR ACCESS CATHETER;  Surgeon:  Werner Mcnamara MD;  Location: [facility] OR     IR CVC TUNNEL PLACEMENT > 5 YRS OF AGE  11/3/2021     IR CVC TUNNEL REMOVAL RIGHT  12/2/2021     THYROIDECTOMY N/A 5/3/2022    Procedure: total thyroidectomy, Left selective neck dissection level 6 and level 7 ;  Surgeon: Clarita Sepulveda MD;  Location: [facility] OR        Family History   Problem Relation Age of Onset     Hypertension Mother      Hyperlipidemia Mother      Heart Disease Paternal Grandmother      Hyperlipidemia Sister      Hyperlipidemia Sister        Social History     Socioeconomic History     Marital status:      Spouse name: Not on file     Number of children: Not on file     Years of education: Not on file     Highest education level: Not on file   Occupational History     Not on file   Tobacco Use     Smoking status: Never     Smokeless tobacco: Never   Substance and Sexual Activity     Alcohol use: Not Currently     Drug use: No     Sexual activity: Yes     Partners: Female   Other Topics Concern     Parent/sibling w/ CABG, MI or angioplasty before 65F 55M? No      Service Not Asked     Blood Transfusions Not Asked     Caffeine Concern No     Occupational Exposure Not Asked     Hobby Hazards Not Asked     Sleep Concern Not Asked     Stress Concern Not Asked     Weight Concern Not Asked     Special Diet No     Back Care Not Asked     Exercise No     Bike Helmet Not Asked     Seat Belt Yes     Self-Exams Not Asked   Social History Narrative     Not on file     Social Determinants of Health     Financial Resource Strain: Not on file   Food Insecurity: Not on file   Transportation Needs: Not on file   Physical Activity: Not on file   Stress: Not on file   Social Connections: Not on file   Intimate Partner Violence: Not At Risk     Fear of Current or Ex-Partner: No     Emotionally Abused: No     Physically Abused: No     Sexually Abused: No   Housing Stability: Not on file       Outpatient Encounter Medications as of 10/11/2022    Medication Sig Dispense Refill     acyclovir (ZOVIRAX) 800 MG tablet Take 1 tablet (800 mg) by mouth 2 times daily 180 tablet 1     aspirin (ASA) 81 MG EC tablet Take 1 tablet (81 mg) by mouth daily       calcium carb-cholecalciferol (OS-HARDEEP) 500-200 MG-UNIT tablet Take 1 tablet by mouth daily 90 tablet 3     calcium carbonate (TUMS) 500 MG chewable tablet Take 2 tablets (1,000 mg) by mouth 3 times daily 90 tablet 1     fluticasone (FLONASE) 50 MCG/ACT nasal spray Spray 1 spray into both nostrils daily 9.9 mL 0     LENalidomide (REVLIMID) 10 MG CAPS capsule Take 1 capsule (10 mg) by mouth daily 28 capsule 0     LENalidomide (REVLIMID) 10 MG CAPS capsule Take 1 capsule (10 mg) by mouth daily 28 capsule 0     LENalidomide (REVLIMID) 10 MG CAPS capsule Take 1 capsule (10 mg) by mouth daily 28 capsule 0     levothyroxine (SYNTHROID/LEVOTHROID) 125 MCG tablet Take 1 tablet (125 mcg) by mouth daily 90 tablet 1     losartan (COZAAR) 50 MG tablet Take 1 tablet (50 mg) by mouth daily 90 tablet 3     meclizine (ANTIVERT) 25 MG tablet Take 1 tablet (25 mg) by mouth 3 times daily as needed for dizziness 30 tablet 0     potassium chloride ER (KLOR-CON M) 10 MEQ CR tablet Take 2 tablets (20 mEq) by mouth daily 60 tablet 3     rosuvastatin (CRESTOR) 40 MG tablet Take 1 tablet (40 mg) by mouth daily 90 tablet 3     sulfamethoxazole-trimethoprim (BACTRIM) 400-80 MG tablet Take 1 tablet by mouth 2 times daily Mondays and Tuesdays       vitamin B complex with vitamin C (STRESS TAB) tablet Take 1 tablet by mouth daily 90 tablet 3     LENalidomide (REVLIMID) 10 MG CAPS capsule Take 1 capsule (10 mg) by mouth daily 28 capsule 0     No facility-administered encounter medications on file as of 10/11/2022.             Review Of Systems  Skin: As above  Eyes: negative  Ears/Nose/Throat: negative  Respiratory: No shortness of breath, dyspnea on exertion, cough, or hemoptysis  Cardiovascular: negative  Gastrointestinal:  negative  Genitourinary: negative  Musculoskeletal: negative  Neurologic: negative  Psychiatric: negative  Hematologic/Lymphatic/Immunologic: negative  Endocrine: negative      O:   NAD, WDWN, Alert & Oriented, Mood & Affect wnl, Vitals stable   Here today alone   General appearance jeri iii   Vitals stable   Alert, oriented and in no acute distress   L thigh firm brown papule with button hole  Tags on neck    Stuck on papules on trunk and ext      Flesh colored papules on trunk      The remainder of the full exam was normal; the following areas were examined:  conjunctiva/lids, oral mucosa, neck, peripheral vascular system, abdomen, lymph nodes, digits/nails, eccrine and apocrine glands, scalp/hair, face, neck, chest, abdomen, buttocks, back, RUE, LUE, RLE, LLE       Eyes: Conjunctivae/lids:Normal     ENT: Lips, buccal mucosa, tongue: normal    MSK:Normal    Cardiovascular: peripheral edema none    Pulm: Breathing Normal    Lymph Nodes: No Head and Neck Lymphadenopathy     Neuro/Psych: Orientation:Normal; Mood/Affect:Normal      A/P:  1. Seborrheic keratosis, dermatofibroma, dermal nevus, skin tags  It was a pleasure speaking to June Ronquillo today.  Previous clinic  notes and pertinent laboratory tests were reviewed prior to June Ronquillo's visit.  Nature of benign skin lesions dicussed with patient.  Patient encouraged to perform monthly skin exams.  UV precautions reviewed with patient.  Return to clinic 12 months        Again, thank you for allowing me to participate in the care of your patient.        Sincerely,        Costa Mccoy MD

## 2022-10-11 NOTE — PROGRESS NOTES
Oncologic Hx:   - 4/30/2021 M spike of 34.8 in urine.M spike in blood 0.2; IgG 702 with depressed IgA and IgM. Beta 2 microglobulin 2.7. Lambda  with K/L ratio of 0.01. WBC 11.1 with absolute eos of 1.0. hgb, plt, Cr (1.1), and albumin WNL.    -4/30/2021 CT abd/pelvis showed sclerotic marrow changes with numerous lytic appearing lesions throughout the imaged portions of the spine, pelvis and ribs.      -PET scan 5/11/2021 Numerous osteolytic lesions which predominantly demonstrate uptake below liver background levels. There is one intraosseous lesion in the left lateral 9th rib that demonstrates uptake above background, possibly due to nondisplaced fracture. Adjacent to this lesion is mild  hypermetabolism to the left pleura, with new small left pleural effusion, suspicious for malignant effusion versus posttraumatic effusion. Pleural uptake may represent extramedullary myeloma extending along the intercostal space versus inflammation.    - BM bx 5/17/2021 with flow showing 4.0% plasma cells which express CD19 (dim), CD38 (bright), CD45 (dim), , and monotypic cytoplasmic lambda immunoglobulin light chains but lack CD20 and CD56.  Hypercellular bone marrow for age (approximately 75% cellularity) with adequate trilineage hematopoiesis. Plasma cell infiltrate: Interstitial, lambda monotypic and representing approximately 60% the bone marrow cellularity, by  immunohistochemical stain. Cytpgenetics with 2 related hypodiploid clones. One with loss of Y, monosomy 13 and 14 (5%) and one with translocation of 8p and 14, derivative 16 with long arm replaced by extra copy 1q,monosomy 21. FISH showed gain of 1q, loss of 13 and 14, IGH-MYC fusion t(8:14)    - Started Miracle-RVd 21 day with velcade on days 1,8,15.     -Cycle 2 Miracle-RVd. Dose reduce dex to 80 mg d/t fatigue and stomach upset on dex days. Also having cough with basilar streaking on CXR    - 8/31/2021 BM bx -rare suspicious plasma cells in touch  "imprint. No increase in plasma cells by morph.    -8/31/2021 PET/CT Diffuse osteolytic lesions throughout the axial and appendicular skeleton. Increased sclerosis since prior exam 5/11/2021 without increased metabolism or size.  Hypermetabolic pretracheal lymph node as well as hypermetabolic level 2 lymph nodes within the right neck. These lymph nodes are likely reactive from autoimmune activation via medical therapy. Hypermetabolic subcentimeter nodules within the thyroid gland    - 11/11/2021 Autologous BMT    - 2/21/22 started maintenance with Revlimid 10 mg daily and daratumumab monthly; did Revlimid alone for C1 due to low IgG levels, added daratumumab starting C2    Interval Hx:   June presents to clinic prior to daratumumab today. He is feeling well. His granddaughter is hoping to get a kitten.     Physical Exam:  /72 (BP Location: Left arm, Patient Position: Sitting, Cuff Size: Adult Regular)   Pulse 76   Temp 97.9  F (36.6  C) (Oral)   Resp 16   Ht 1.651 m (5' 5\")   Wt 71.6 kg (157 lb 12.8 oz)   SpO2 99%   BMI 26.26 kg/m      Constitutional: NAD  Eyes: no scleral icterus  ENT: no oral lesions  Lymph: no cervical, supraclavicular, axillary LAD  Pulm: CTAB  CV: RRR, no m/r/g  GI: bowel sounds present, soft and nontender to palpation  MSK: No edema in the extremities  Neuro: alert, conversant, normal gait  Psych: appropriate mood and affect    Assessment and Plan  Logan has multiple myeloma with high risk cytogenetics. He got Vidya-RVD with VGPR, then underwent autologous transplant 11/11/21. Continue acyclovir daily for viral ppx and bactrim M/T for PJP ppx. Given his high risk cytogenetics he started vidya + revlimid maintenance therapy 2/21/22. Follow IgG and replete if it remains <300. He had severe flu-like effects from his first dose of Zometa but he has been tolerating monthly Zometa dosed at 3 mg. Continue Vit D. Continue B vitamin supplement to help his neuropathy.   - will proceed " with vidya + revlimid today, labs stable. Continue zometa    -Evusheld given today    Elevated serum creatinine  Creatinine is slightly elevated above baseline.  Encouraged him to increase oral fluid intake. Will monitor in case revlimid needs to be dose reduced and he needs nephrology consult    Hypogammaglobulinemia Start monthly IV Ig whenever IgG <300    Normocytic Anemia: likely d/t revlimid and daratumumab. Will keep monitoring and dose reduce if needed.      Barbi Vazquez MD PhD        Evusheld Consent   Confirmed patient received the Emergency Use Authorization Fact Sheet for Patients, Parents and Caregivers prior to receiving medication. We discussed the following risks and benefits of receiving EVUSHELD, as well as alternative treatments and the patient wished to proceed with EVUSHELD.     Providers believe the benefits of Evusheld outweigh the risks for all moderately to severely immunocompromised patients.      Benefits:   Evusheld is a synthetic antibody that provides protection against COVID-19 for 6 months and is recommended for patients that have a weakened immune system that may not be able to make antibodies themselves.     Risks:    There is a very small risk of allergic reactions and you should notify us if you have any symptoms of an allergic reaction after the injection. There were also some extremely rare cardiac events reported in the initial trials in people that already had heart disease, but experts do not think these were caused by the medication. We will observe you for any chest pain or trouble breathing after the injections and you can reach out to your provider if any of these symptoms develop.     Alternatives:   Vaccines to prevent COVID-19 are approved or available under Emergency Use Authorization. Use of EVUSHELD does not replace vaccination against COVID-19. You can continue to mask and isolate to avoid infections. It is your choice to receive or not receive EVUSHELD. Should you  decide not to receive EVUSHELD, it will not change your standard medical care.     Patient does consent to the injection.

## 2022-10-11 NOTE — NURSING NOTE
EVUSHELD Administration Note:  June Banegasdan presents today for EVUSHELD.   present during visit today: Not Applicable.    Consent:   Informed Consent confirmed in chart, obtained on this date: 10/11/22    Post Injection Assessment:   Patient tolerated injection without incident.  Patient observed for 60 minutes post Evusheld per protocol.      Patient was observed in the room for a minimum of 10 minutes after injection per standard, then remained in the buidling for a total 60 minute observation period.         Discharge Plan:    Discharge instructions reviewed with: Patient.  Patient and/or family verbalized understanding of discharge instructions and all questions answered.  Patient discharged in stable condition accompanied by: self.  Departure Mode: Ambulatory.

## 2022-10-12 LAB
ALBUMIN SERPL ELPH-MCNC: 3.8 G/DL (ref 3.7–5.1)
ALPHA1 GLOB SERPL ELPH-MCNC: 0.3 G/DL (ref 0.2–0.4)
ALPHA2 GLOB SERPL ELPH-MCNC: 0.5 G/DL (ref 0.5–0.9)
B-GLOBULIN SERPL ELPH-MCNC: 0.7 G/DL (ref 0.6–1)
GAMMA GLOB SERPL ELPH-MCNC: 0.3 G/DL (ref 0.7–1.6)
IGA SERPL-MCNC: 64 MG/DL (ref 84–499)
IGG SERPL-MCNC: 316 MG/DL (ref 610–1616)
IGM SERPL-MCNC: 23 MG/DL (ref 35–242)
KAPPA LC FREE SER-MCNC: 0.9 MG/DL (ref 0.33–1.94)
KAPPA LC FREE/LAMBDA FREE SER NEPH: 1.36 {RATIO} (ref 0.26–1.65)
LAMBDA LC FREE SERPL-MCNC: 0.66 MG/DL (ref 0.57–2.63)
M PROTEIN SERPL ELPH-MCNC: 0.1 G/DL
PROT PATTERN SERPL ELPH-IMP: ABNORMAL
PROT PATTERN SERPL IFE-IMP: NORMAL

## 2022-10-12 NOTE — NURSING NOTE
"Oncology Rooming Note    October 12, 2022 7:17 AM   June Ronquillo is a 52 year old male who presents for:    Chief Complaint   Patient presents with     Blood Draw     Vitals taken, [facility], labs drawn, saline locked, checked into next appt     Oncology Clinic Visit     [facility] RETURN -MULTIPLE MYELOMA     Initial Vitals: /72 (BP Location: Left arm, Patient Position: Sitting, Cuff Size: Adult Regular)   Pulse 76   Temp 97.9  F (36.6  C) (Oral)   Resp 16   Ht 1.651 m (5' 5\")   Wt 71.6 kg (157 lb 12.8 oz)   SpO2 99%   BMI 26.26 kg/m   Estimated body mass index is 26.26 kg/m  as calculated from the following:    Height as of this encounter: 1.651 m (5' 5\").    Weight as of this encounter: 71.6 kg (157 lb 12.8 oz). Body surface area is 1.81 meters squared.  No Pain (0) Comment: Data Unavailable   No LMP for male patient.  Allergies reviewed: Yes  Medications reviewed: Yes    Medications: Medication refills not needed today.  Pharmacy name entered into [employer]:    [facility] [place], [place] - [address] [facility] - [place], [place] - [address] [place] [address] PHARMACY [place], [place] - [phone]    Clinical concerns: No new concerns. George was notified.      Juan Saenz LPN            "

## 2022-10-23 ENCOUNTER — HEALTH MAINTENANCE LETTER (OUTPATIENT)
Age: 52
End: 2022-10-23

## 2022-10-26 NOTE — TELEPHONE ENCOUNTER
FUTURE VISIT INFORMATION      FUTURE VISIT INFORMATION:    Date: 1/3/22    Time: 3:45pm    Location: [facility]  REFERRAL INFORMATION:    Referring provider:  Yasmin Johnson PA-C    Referring providers clinic:  [facility]     Reason for visit/diagnosis  appt per pt / Serous otitis media / referred by Yasmin Johnson PA-C / med recs in epic / [facility] confirmed     RECORDS REQUESTED FROM:         Clinic name Comments Records Status Imaging Status   [facility]  8/16/22 note and referral from Yasmin Johnson PA-C EPIC     Imaging  2/19/22 PET   11/23/21 CT Sinus   5/11/21 CT NECK  Epic PACS                                                    Subjective   Patient presents to ER with generalized weakness        Weakness - Generalized   Severity:  Mild  Onset quality:  Gradual  Timing:  Constant  Progression:  Worsening  Chronicity:  Recurrent  Context: dehydration        Review of Systems   Constitutional: Positive for activity change and fatigue.   HENT: Negative.    Eyes: Negative.    Respiratory: Negative.    Cardiovascular: Negative.    Gastrointestinal: Negative.    Endocrine: Negative.    Genitourinary: Negative.    Musculoskeletal: Negative.    Allergic/Immunologic: Negative.    Hematological: Negative.    Psychiatric/Behavioral: Negative.        Past Medical History:   Diagnosis Date   • Acute renal failure (CMS/HCC)    • Alcohol abuse     in remission   • Anxiety    • Bell's palsy    • Cellulitis     LLE   • CHF (congestive heart failure) (CMS/HCC)     Diastolic   • CHF (congestive heart failure) (CMS/HCC)    • Constipation    • Coronary artery disease    • Diverticulosis    • GERD (gastroesophageal reflux disease)    • Hepatic cirrhosis (CMS/HCC)    • Hepatitis C    • History of transfusion    • Hypertension    • Hypothyroidism    • MI (mitral incompetence)    • Osteoporosis    • Osteoporosis    • Restless leg syndrome    • Thrombocytopenia (CMS/HCC)    • Trigeminal neuralgia        Allergies   Allergen Reactions   • Ciprofloxacin        Past Surgical History:   Procedure Laterality Date   • BACK SURGERY      KYPHOPLASTIES    • CARDIAC CATHETERIZATION Left 08/2004   • CARDIOVASCULAR STRESS TEST  01/25/2015   • CATARACT EXTRACTION     • CHOLECYSTECTOMY     • CLAVICLE SURGERY Right    • COLONOSCOPY  10/2004   • ECHO - CONVERTED  11/2012   • FEMUR OPEN REDUCTION INTERNAL FIXATION Left 10/12/2017    Procedure: FEMUR OPEN REDUCTION INTERNAL FIXATION;  Surgeon: Karson Pierre MD;  Location: [facility];  Service:    • HIP FRACTURE SURGERY Bilateral     ARTHROPLASTIES    • PARTIAL HYSTERECTOMY     • PORTACATH PLACEMENT N/A 9/28/2018     Procedure: INSERTION OF PORTACATH;  Surgeon: Vic Mauricio MD;  Location: [facility] OR;  Service: General   • WRIST FRACTURE SURGERY         Family History   Problem Relation Age of Onset   • Cancer Mother    • Heart disease Father    • Arthritis Sister    • Osteoporosis Sister    • Diabetes Maternal Aunt        Social History     Socioeconomic History   • Marital status:      Spouse name: Not on file   • Number of children: Not on file   • Years of education: Not on file   • Highest education level: Not on file   Tobacco Use   • Smoking status: Never Smoker   • Smokeless tobacco: Former User     Types: Snuff   Substance and Sexual Activity   • Alcohol use: No     Comment: FORMER 12 BOTTLES PER DAY FOR 15 YEARS   • Drug use: No   • Sexual activity: Defer   Social History Narrative    LIVES WITH GRANDDAUGHTER DVEON IN [place]            Objective   Physical Exam   Constitutional: She appears well-developed.   HENT:   Head: Normocephalic and atraumatic.   Eyes: EOM are normal. Pupils are equal, round, and reactive to light.   Neck: Normal range of motion.   Cardiovascular: Normal rate.   Pulmonary/Chest: Effort normal.   Abdominal: Soft.   Musculoskeletal: Normal range of motion.   Neurological: She is alert. She has normal strength.   Skin: Skin is warm.   Psychiatric: She has a normal mood and affect.       Procedures           ED Course  ED Course as of Mar 26 1417   Sat Mar 16, 2019   2250 CXR negative  [ROXANA]   Sun Mar 17, 2019   0151 Probably chronic microvascular ischemic changes. CT Head Without Contrast [KK]      ED Course User Index  [ROXANA] Rex Adam MD  [KK] Felipe Melton APRN                  MDM      Final diagnoses:   Mild dehydration            Rex Adam MD  03/16/19 2254       Rex Adam MD  03/26/19 1342       Rex Adam MD  03/26/19 1417

## 2022-10-31 DIAGNOSIS — C90.00 MULTIPLE MYELOMA NOT HAVING ACHIEVED REMISSION (H): Primary | ICD-10-CM

## 2022-10-31 RX ORDER — DIPHENHYDRAMINE HYDROCHLORIDE 50 MG/ML
50 INJECTION INTRAMUSCULAR; INTRAVENOUS
Status: CANCELLED
Start: 2022-12-06

## 2022-10-31 RX ORDER — HEPARIN SODIUM (PORCINE) LOCK FLUSH IV SOLN 100 UNIT/ML 100 UNIT/ML
5 SOLUTION INTRAVENOUS
Status: CANCELLED | OUTPATIENT
Start: 2022-12-06

## 2022-10-31 RX ORDER — NALOXONE HYDROCHLORIDE 0.4 MG/ML
0.2 INJECTION, SOLUTION INTRAMUSCULAR; INTRAVENOUS; SUBCUTANEOUS
Status: CANCELLED | OUTPATIENT
Start: 2022-12-06

## 2022-10-31 RX ORDER — NALOXONE HYDROCHLORIDE 0.4 MG/ML
0.2 INJECTION, SOLUTION INTRAMUSCULAR; INTRAVENOUS; SUBCUTANEOUS
Status: CANCELLED | OUTPATIENT
Start: 2022-11-08

## 2022-10-31 RX ORDER — ALBUTEROL SULFATE 0.83 MG/ML
2.5 SOLUTION RESPIRATORY (INHALATION)
Status: CANCELLED | OUTPATIENT
Start: 2022-12-06

## 2022-10-31 RX ORDER — ACETAMINOPHEN 325 MG/1
650 TABLET ORAL ONCE
Status: CANCELLED
Start: 2022-11-08 | End: 2022-11-08

## 2022-10-31 RX ORDER — DIPHENHYDRAMINE HCL 25 MG
50 CAPSULE ORAL ONCE
Status: CANCELLED
Start: 2022-12-06 | End: 2022-12-06

## 2022-10-31 RX ORDER — HEPARIN SODIUM,PORCINE 10 UNIT/ML
5 VIAL (ML) INTRAVENOUS
Status: CANCELLED | OUTPATIENT
Start: 2022-12-06

## 2022-10-31 RX ORDER — HEPARIN SODIUM,PORCINE 10 UNIT/ML
5 VIAL (ML) INTRAVENOUS
Status: CANCELLED | OUTPATIENT
Start: 2023-01-03

## 2022-10-31 RX ORDER — EPINEPHRINE 1 MG/ML
0.3 INJECTION, SOLUTION INTRAMUSCULAR; SUBCUTANEOUS EVERY 5 MIN PRN
Status: CANCELLED | OUTPATIENT
Start: 2023-01-03

## 2022-10-31 RX ORDER — METHYLPREDNISOLONE SODIUM SUCCINATE 125 MG/2ML
125 INJECTION, POWDER, LYOPHILIZED, FOR SOLUTION INTRAMUSCULAR; INTRAVENOUS
Status: CANCELLED
Start: 2022-11-08

## 2022-10-31 RX ORDER — METHYLPREDNISOLONE SODIUM SUCCINATE 125 MG/2ML
125 INJECTION, POWDER, LYOPHILIZED, FOR SOLUTION INTRAMUSCULAR; INTRAVENOUS
Status: CANCELLED
Start: 2022-12-06

## 2022-10-31 RX ORDER — ALBUTEROL SULFATE 90 UG/1
1-2 AEROSOL, METERED RESPIRATORY (INHALATION)
Status: CANCELLED
Start: 2023-01-03

## 2022-10-31 RX ORDER — DEXAMETHASONE 4 MG/1
12 TABLET ORAL ONCE
Status: CANCELLED
Start: 2022-11-08 | End: 2022-11-08

## 2022-10-31 RX ORDER — EPINEPHRINE 1 MG/ML
0.3 INJECTION, SOLUTION INTRAMUSCULAR; SUBCUTANEOUS EVERY 5 MIN PRN
Status: CANCELLED | OUTPATIENT
Start: 2022-12-06

## 2022-10-31 RX ORDER — DIPHENHYDRAMINE HYDROCHLORIDE 50 MG/ML
50 INJECTION INTRAMUSCULAR; INTRAVENOUS
Status: CANCELLED
Start: 2023-01-03

## 2022-10-31 RX ORDER — DIPHENHYDRAMINE HCL 25 MG
50 CAPSULE ORAL ONCE
Status: CANCELLED
Start: 2023-01-03 | End: 2023-01-03

## 2022-10-31 RX ORDER — HEPARIN SODIUM (PORCINE) LOCK FLUSH IV SOLN 100 UNIT/ML 100 UNIT/ML
5 SOLUTION INTRAVENOUS
Status: CANCELLED | OUTPATIENT
Start: 2022-11-08

## 2022-10-31 RX ORDER — DIPHENHYDRAMINE HCL 25 MG
50 CAPSULE ORAL ONCE
Status: CANCELLED
Start: 2022-11-08 | End: 2022-11-08

## 2022-10-31 RX ORDER — MEPERIDINE HYDROCHLORIDE 25 MG/ML
25 INJECTION INTRAMUSCULAR; INTRAVENOUS; SUBCUTANEOUS EVERY 30 MIN PRN
Status: CANCELLED | OUTPATIENT
Start: 2022-11-08

## 2022-10-31 RX ORDER — ALBUTEROL SULFATE 0.83 MG/ML
2.5 SOLUTION RESPIRATORY (INHALATION)
Status: CANCELLED | OUTPATIENT
Start: 2023-01-03

## 2022-10-31 RX ORDER — DEXAMETHASONE 4 MG/1
12 TABLET ORAL ONCE
Status: CANCELLED
Start: 2023-01-03 | End: 2023-01-03

## 2022-10-31 RX ORDER — ACETAMINOPHEN 325 MG/1
650 TABLET ORAL ONCE
Status: CANCELLED
Start: 2022-12-06 | End: 2022-12-06

## 2022-10-31 RX ORDER — DIPHENHYDRAMINE HYDROCHLORIDE 50 MG/ML
50 INJECTION INTRAMUSCULAR; INTRAVENOUS
Status: CANCELLED
Start: 2022-11-08

## 2022-10-31 RX ORDER — HEPARIN SODIUM (PORCINE) LOCK FLUSH IV SOLN 100 UNIT/ML 100 UNIT/ML
5 SOLUTION INTRAVENOUS
Status: CANCELLED | OUTPATIENT
Start: 2023-01-03

## 2022-10-31 RX ORDER — METHYLPREDNISOLONE SODIUM SUCCINATE 125 MG/2ML
125 INJECTION, POWDER, LYOPHILIZED, FOR SOLUTION INTRAMUSCULAR; INTRAVENOUS
Status: CANCELLED
Start: 2023-01-03

## 2022-10-31 RX ORDER — EPINEPHRINE 1 MG/ML
0.3 INJECTION, SOLUTION INTRAMUSCULAR; SUBCUTANEOUS EVERY 5 MIN PRN
Status: CANCELLED | OUTPATIENT
Start: 2022-11-08

## 2022-10-31 RX ORDER — ACETAMINOPHEN 325 MG/1
650 TABLET ORAL ONCE
Status: CANCELLED
Start: 2023-01-03 | End: 2023-01-03

## 2022-10-31 RX ORDER — HEPARIN SODIUM,PORCINE 10 UNIT/ML
5 VIAL (ML) INTRAVENOUS
Status: CANCELLED | OUTPATIENT
Start: 2022-11-08

## 2022-10-31 RX ORDER — MEPERIDINE HYDROCHLORIDE 25 MG/ML
25 INJECTION INTRAMUSCULAR; INTRAVENOUS; SUBCUTANEOUS EVERY 30 MIN PRN
Status: CANCELLED | OUTPATIENT
Start: 2023-01-03

## 2022-10-31 RX ORDER — ALBUTEROL SULFATE 90 UG/1
1-2 AEROSOL, METERED RESPIRATORY (INHALATION)
Status: CANCELLED
Start: 2022-12-06

## 2022-10-31 RX ORDER — NALOXONE HYDROCHLORIDE 0.4 MG/ML
0.2 INJECTION, SOLUTION INTRAMUSCULAR; INTRAVENOUS; SUBCUTANEOUS
Status: CANCELLED | OUTPATIENT
Start: 2023-01-03

## 2022-10-31 RX ORDER — MEPERIDINE HYDROCHLORIDE 25 MG/ML
25 INJECTION INTRAMUSCULAR; INTRAVENOUS; SUBCUTANEOUS EVERY 30 MIN PRN
Status: CANCELLED | OUTPATIENT
Start: 2022-12-06

## 2022-10-31 RX ORDER — DEXAMETHASONE 4 MG/1
12 TABLET ORAL ONCE
Status: CANCELLED
Start: 2022-12-06 | End: 2022-12-06

## 2022-10-31 RX ORDER — ALBUTEROL SULFATE 90 UG/1
1-2 AEROSOL, METERED RESPIRATORY (INHALATION)
Status: CANCELLED
Start: 2022-11-08

## 2022-10-31 RX ORDER — ALBUTEROL SULFATE 0.83 MG/ML
2.5 SOLUTION RESPIRATORY (INHALATION)
Status: CANCELLED | OUTPATIENT
Start: 2022-11-08

## 2022-11-04 ENCOUNTER — TELEPHONE (OUTPATIENT)
Dept: ONCOLOGY | Facility: CLINIC | Age: 52
End: 2022-11-04

## 2022-11-04 DIAGNOSIS — C90.00 MULTIPLE MYELOMA NOT HAVING ACHIEVED REMISSION (H): Primary | ICD-10-CM

## 2022-11-04 RX ORDER — LENALIDOMIDE 10 MG/1
10 CAPSULE ORAL DAILY
Qty: 28 CAPSULE | Refills: 0 | Status: SHIPPED | OUTPATIENT
Start: 2022-11-04 | End: 2022-11-19

## 2022-11-04 NOTE — ORAL ONC MGMT
Oral Chemotherapy Monitoring Program    Subjective/Objective:  June Ronquillo is a 52 year old male contacted by phone for a follow-up visit for oral chemotherapy. June confirms taking Revlimid 10 mg po daily. He has not missed any doses recently. He denies any adverse effects and reports he is doing well on the medication.     Assessment/Plan:  Patient is tolerating Revlimid - continue as prescribed.     Follow-Up:  11/10 lab/infusion appointments    Flori Lima,BrittD, [facility]  Oral Chemotherapy Monitoring Program  [facility]  [phone]  November 4, 2022        ORAL CHEMOTHERAPY 6/7/2022 6/17/2022 7/15/2022 8/10/2022 9/7/2022 10/5/2022 11/4/2022   Assessment Type Lab Monitoring;Monthly Follow up Refill Refill Refill Refill Refill Refill   Stop Date - - - - - - -   Reason for Discontinuation - - - - - - -   Diagnosis Code Multiple Myeloma Multiple Myeloma Multiple Myeloma Multiple Myeloma Multiple Myeloma Multiple Myeloma Multiple Myeloma   Providers Dr. George Vazquez   [facility]   Drug Name Revlimid (lenalidomide) Revlimid (lenalidomide) Revlimid (lenalidomide) Revlimid (lenalidomide) Revlimid (lenalidomide) Revlimid (lenalidomide) Revlimid (lenalidomide)   Dose 10 mg 10 mg 10 mg - 10 mg 10 mg 10 mg   Current Schedule Daily Daily Daily - Daily Daily Daily   Cycle Details Continuous Continuous Continuous - Continuous Continuous Continuous   Start Date of Last Cycle - - - - - - -   Planned next cycle start date - - - - - - -   Doses missed in last 2 weeks 0 - - - - - -   Adherence Assessment Adherent - - - - - -   Adverse Effects No AE identified during assessment - - - - - -   Any new drug interactions? No - - - - - -   Is the dose as ordered appropriate for the patient? Yes - - - - - -   Since the last time we talked, have you been hospitalized or used the emergency  "room? No - - - - - -       Last PHQ-2 Score on record:   PHQ-2 ( 1999 [employer]) 9/21/2022 4/19/2022   Q1: Little interest or pleasure in doing things 0 0   Q2: Feeling down, depressed or hopeless 0 0   PHQ-2 Score 0 0   PHQ-2 Total Score (12-17 Years)- Positive if 3 or more points; Administer PHQ-A if positive - -   Q1: Little interest or pleasure in doing things Not at all Not at all   Q2: Feeling down, depressed or hopeless Not at all Not at all   PHQ-2 Score 0 0       Vitals:  BP:   BP Readings from Last 1 Encounters:   10/11/22 122/72     Wt Readings from Last 1 Encounters:   10/11/22 71.6 kg (157 lb 12.8 oz)     Estimated body surface area is 1.81 meters squared as calculated from the following:    Height as of 10/11/22: 1.651 m (5' 5\").    Weight as of 10/11/22: 71.6 kg (157 lb 12.8 oz).    Labs:  _  Result Component Current Result Ref Range   Sodium 136 (10/11/2022) 136 - 145 mmol/L     _  Result Component Current Result Ref Range   Potassium 3.6 (10/11/2022) 3.4 - 5.3 mmol/L     _  Result Component Current Result Ref Range   Calcium 8.8 (10/11/2022) 8.6 - 10.0 mg/dL     No results found for Mag within last 30 days.     No results found for Phos within last 30 days.     _  Result Component Current Result Ref Range   Albumin 4.3 (10/11/2022) 3.5 - 5.2 g/dL     _  Result Component Current Result Ref Range   Urea Nitrogen 8.6 (10/11/2022) 6.0 - 20.0 mg/dL     _  Result Component Current Result Ref Range   Creatinine 1.33 (H) (10/11/2022) 0.67 - 1.17 mg/dL     _  Result Component Current Result Ref Range   AST 27 (10/11/2022) 10 - 50 U/L     _  Result Component Current Result Ref Range   ALT 15 (10/11/2022) 10 - 50 U/L     _  Result Component Current Result Ref Range   Bilirubin Total 0.3 (10/11/2022) <=1.2 mg/dL     _  Result Component Current Result Ref Range   WBC Count 5.0 (10/11/2022) 4.0 - 11.0 10e3/uL     _  Result Component Current Result Ref Range   Hemoglobin 10.6 (L) (10/11/2022) 13.3 - 17.7 g/dL "     _  Result Component Current Result Ref Range   Platelet Count 306 (10/11/2022) 150 - 450 10e3/uL     No results found for ANC within last 30 days.     _  Result Component Current Result Ref Range   Absolute Neutrophils 2.3 (10/11/2022) 1.6 - 8.3 10e3/uL

## 2022-11-08 ENCOUNTER — TELEPHONE (OUTPATIENT)
Dept: ONCOLOGY | Facility: CLINIC | Age: 52
End: 2022-11-08

## 2022-11-08 ENCOUNTER — PRE VISIT (OUTPATIENT)
Dept: OTOLARYNGOLOGY | Facility: CLINIC | Age: 52
End: 2022-11-08

## 2022-11-08 NOTE — TELEPHONE ENCOUNTER
Oral Chemotherapy Monitoring Program   Medication: Revlimid  Rx: 10mg PO daily on days 1 through 28 of 28 day cycle   Auth #: [id number]   Risk Category: Adult Male  Routine survey questions reviewed.   Rx to be Escribed to [facility]     Yu Randallcloth  [facility]  Oncology Pharmacy Liaison   [email or web address]  [phone] (phone)  [phone] (fax)

## 2022-11-10 ENCOUNTER — INFUSION THERAPY VISIT (OUTPATIENT)
Dept: ONCOLOGY | Facility: CLINIC | Age: 52
End: 2022-11-10
Attending: STUDENT IN AN ORGANIZED HEALTH CARE EDUCATION/TRAINING PROGRAM
Payer: COMMERCIAL

## 2022-11-10 ENCOUNTER — MYC MEDICAL ADVICE (OUTPATIENT)
Dept: ONCOLOGY | Facility: CLINIC | Age: 52
End: 2022-11-10

## 2022-11-10 ENCOUNTER — LAB (OUTPATIENT)
Dept: LAB | Facility: CLINIC | Age: 52
End: 2022-11-10
Attending: STUDENT IN AN ORGANIZED HEALTH CARE EDUCATION/TRAINING PROGRAM
Payer: COMMERCIAL

## 2022-11-10 VITALS
SYSTOLIC BLOOD PRESSURE: 127 MMHG | TEMPERATURE: 98.3 F | DIASTOLIC BLOOD PRESSURE: 67 MMHG | HEART RATE: 82 BPM | OXYGEN SATURATION: 99 % | BODY MASS INDEX: 25.91 KG/M2 | RESPIRATION RATE: 18 BRPM | WEIGHT: 155.7 LBS

## 2022-11-10 DIAGNOSIS — C90.00 MULTIPLE MYELOMA NOT HAVING ACHIEVED REMISSION (H): Primary | ICD-10-CM

## 2022-11-10 LAB
ALBUMIN MFR UR ELPH: 14.6 MG/DL
ALBUMIN SERPL BCG-MCNC: 4.5 G/DL (ref 3.5–5.2)
ALP SERPL-CCNC: 53 U/L (ref 40–129)
ALT SERPL W P-5'-P-CCNC: 12 U/L (ref 10–50)
ANION GAP SERPL CALCULATED.3IONS-SCNC: 12 MMOL/L (ref 7–15)
AST SERPL W P-5'-P-CCNC: 24 U/L (ref 10–50)
BASOPHILS # BLD AUTO: 0.1 10E3/UL (ref 0–0.2)
BASOPHILS NFR BLD AUTO: 1 %
BILIRUB SERPL-MCNC: 0.4 MG/DL
BUN SERPL-MCNC: 16.4 MG/DL (ref 6–20)
CALCIUM SERPL-MCNC: 9.1 MG/DL (ref 8.6–10)
CD19 CELLS # BLD: 29 CELLS/UL (ref 107–698)
CD19 CELLS NFR BLD: 2 % (ref 6–27)
CD3 CELLS # BLD: 1255 CELLS/UL (ref 603–2990)
CD3 CELLS NFR BLD: 95 % (ref 49–84)
CD3+CD4+ CELLS # BLD: 539 CELLS/UL (ref 441–2156)
CD3+CD4+ CELLS NFR BLD: 41 % (ref 28–63)
CD3+CD4+ CELLS/CD3+CD8+ CLL BLD: 0.77 % (ref 1.4–2.6)
CD3+CD8+ CELLS # BLD: 697 CELLS/UL (ref 125–1312)
CD3+CD8+ CELLS NFR BLD: 53 % (ref 10–40)
CD3-CD16+CD56+ CELLS # BLD: 31 CELLS/UL (ref 95–640)
CD3-CD16+CD56+ CELLS NFR BLD: 2 % (ref 4–25)
CHLORIDE SERPL-SCNC: 104 MMOL/L (ref 98–107)
CREAT SERPL-MCNC: 1.3 MG/DL (ref 0.67–1.17)
CREAT UR-MCNC: 182 MG/DL
DEPRECATED HCO3 PLAS-SCNC: 21 MMOL/L (ref 22–29)
EOSINOPHIL # BLD AUTO: 0.5 10E3/UL (ref 0–0.7)
EOSINOPHIL NFR BLD AUTO: 10 %
ERYTHROCYTE [DISTWIDTH] IN BLOOD BY AUTOMATED COUNT: 17.2 % (ref 10–15)
GFR SERPL CREATININE-BSD FRML MDRD: 65 ML/MIN/1.73M2
GLUCOSE SERPL-MCNC: 145 MG/DL (ref 70–99)
HCT VFR BLD AUTO: 35.3 % (ref 40–53)
HGB BLD-MCNC: 11.6 G/DL (ref 13.3–17.7)
IMM GRANULOCYTES # BLD: 0 10E3/UL
IMM GRANULOCYTES NFR BLD: 0 %
LDH SERPL L TO P-CCNC: 61 U/L (ref 0–250)
LYMPHOCYTES # BLD AUTO: 1.2 10E3/UL (ref 0.8–5.3)
LYMPHOCYTES NFR BLD AUTO: 24 %
MAGNESIUM SERPL-MCNC: 1.9 MG/DL (ref 1.7–2.3)
MCH RBC QN AUTO: 29.7 PG (ref 26.5–33)
MCHC RBC AUTO-ENTMCNC: 32.9 G/DL (ref 31.5–36.5)
MCV RBC AUTO: 90 FL (ref 78–100)
MONOCYTES # BLD AUTO: 0.6 10E3/UL (ref 0–1.3)
MONOCYTES NFR BLD AUTO: 12 %
NEUTROPHILS # BLD AUTO: 2.7 10E3/UL (ref 1.6–8.3)
NEUTROPHILS NFR BLD AUTO: 53 %
NRBC # BLD AUTO: 0 10E3/UL
NRBC BLD AUTO-RTO: 0 /100
PHOSPHATE SERPL-MCNC: 2.6 MG/DL (ref 2.5–4.5)
PLATELET # BLD AUTO: 277 10E3/UL (ref 150–450)
POTASSIUM SERPL-SCNC: 3.6 MMOL/L (ref 3.4–5.3)
PROT SERPL-MCNC: 6.5 G/DL (ref 6.4–8.3)
PROT/CREAT 24H UR: 0.08 MG/MG CR (ref 0–0.2)
RBC # BLD AUTO: 3.91 10E6/UL (ref 4.4–5.9)
SODIUM SERPL-SCNC: 137 MMOL/L (ref 136–145)
T CELL EXTENDED COMMENT: ABNORMAL
TOTAL PROTEIN SERUM FOR ELP: 6.2 G/DL (ref 6.4–8.3)
URATE SERPL-MCNC: 1.5 MG/DL (ref 3.4–7)
WBC # BLD AUTO: 5 10E3/UL (ref 4–11)

## 2022-11-10 PROCEDURE — 258N000003 HC RX IP 258 OP 636: Performed by: STUDENT IN AN ORGANIZED HEALTH CARE EDUCATION/TRAINING PROGRAM

## 2022-11-10 PROCEDURE — 84100 ASSAY OF PHOSPHORUS: CPT | Performed by: INTERNAL MEDICINE

## 2022-11-10 PROCEDURE — 84155 ASSAY OF PROTEIN SERUM: CPT | Mod: 91

## 2022-11-10 PROCEDURE — 84165 PROTEIN E-PHORESIS SERUM: CPT | Mod: TC | Performed by: PATHOLOGY

## 2022-11-10 PROCEDURE — 96365 THER/PROPH/DIAG IV INF INIT: CPT

## 2022-11-10 PROCEDURE — 84156 ASSAY OF PROTEIN URINE: CPT | Performed by: INTERNAL MEDICINE

## 2022-11-10 PROCEDURE — 86334 IMMUNOFIX E-PHORESIS SERUM: CPT | Mod: 26

## 2022-11-10 PROCEDURE — 83521 IG LIGHT CHAINS FREE EACH: CPT

## 2022-11-10 PROCEDURE — 250N000011 HC RX IP 250 OP 636: Performed by: STUDENT IN AN ORGANIZED HEALTH CARE EDUCATION/TRAINING PROGRAM

## 2022-11-10 PROCEDURE — 84166 PROTEIN E-PHORESIS/URINE/CSF: CPT

## 2022-11-10 PROCEDURE — 86355 B CELLS TOTAL COUNT: CPT | Performed by: INTERNAL MEDICINE

## 2022-11-10 PROCEDURE — 84166 PROTEIN E-PHORESIS/URINE/CSF: CPT | Performed by: PATHOLOGY

## 2022-11-10 PROCEDURE — 999N000128 HC STATISTIC PERIPHERAL IV START W/O US GUIDANCE

## 2022-11-10 PROCEDURE — 83615 LACTATE (LD) (LDH) ENZYME: CPT | Performed by: INTERNAL MEDICINE

## 2022-11-10 PROCEDURE — 86334 IMMUNOFIX E-PHORESIS SERUM: CPT | Performed by: PATHOLOGY

## 2022-11-10 PROCEDURE — 86335 IMMUNFIX E-PHORSIS/URINE/CSF: CPT | Mod: 26

## 2022-11-10 PROCEDURE — 84165 PROTEIN E-PHORESIS SERUM: CPT | Mod: 26

## 2022-11-10 PROCEDURE — 82784 ASSAY IGA/IGD/IGG/IGM EACH: CPT

## 2022-11-10 PROCEDURE — 96401 CHEMO ANTI-NEOPL SQ/IM: CPT

## 2022-11-10 PROCEDURE — 86335 IMMUNFIX E-PHORSIS/URINE/CSF: CPT | Performed by: PATHOLOGY

## 2022-11-10 PROCEDURE — 999N000285 HC STATISTIC VASC ACCESS LAB DRAW WITH PIV START

## 2022-11-10 PROCEDURE — 36415 COLL VENOUS BLD VENIPUNCTURE: CPT

## 2022-11-10 PROCEDURE — 82306 VITAMIN D 25 HYDROXY: CPT | Performed by: INTERNAL MEDICINE

## 2022-11-10 PROCEDURE — 83735 ASSAY OF MAGNESIUM: CPT | Performed by: INTERNAL MEDICINE

## 2022-11-10 PROCEDURE — 85025 COMPLETE CBC W/AUTO DIFF WBC: CPT

## 2022-11-10 PROCEDURE — 84550 ASSAY OF BLOOD/URIC ACID: CPT | Performed by: INTERNAL MEDICINE

## 2022-11-10 PROCEDURE — 82310 ASSAY OF CALCIUM: CPT

## 2022-11-10 RX ADMIN — DARATUMUMAB AND HYALURONIDASE-FIHJ (HUMAN RECOMBINANT) 1800 MG: 1800; 30000 INJECTION SUBCUTANEOUS at 14:21

## 2022-11-10 RX ADMIN — ZOLEDRONIC ACID 3 MG: 4 INJECTION, SOLUTION, CONCENTRATE INTRAVENOUS at 14:19

## 2022-11-10 RX ADMIN — SODIUM CHLORIDE 250 ML: 9 INJECTION, SOLUTION INTRAVENOUS at 14:01

## 2022-11-10 ASSESSMENT — PAIN SCALES - GENERAL: PAINLEVEL: NO PAIN (0)

## 2022-11-10 NOTE — NURSING NOTE
Chief Complaint   Patient presents with     Blood Draw     Labs drawn via piv placed by RN in lab. VS taken.      Labs drawn from PIV placed by RN. Line flushed with saline. Vitals taken. Pt checked in for appointment(s).    Nunu Santoyo RN

## 2022-11-10 NOTE — PATIENT INSTRUCTIONS
Contact Numbers  [facility]: [phone]    After Hours:  [phone]  Triage: [phone]    Please call the [facility] Triage line if you experience a temperature greater than or equal to 100.5, shaking chills, have uncontrolled nausea, vomiting and/or diarrhea, dizziness, shortness of breath, chest pain, bleeding, unexplained bruising, or if you have any other new/concerning symptoms, questions or concerns.     If it is after hours, weekends, or holidays, please call the main hospital  at  [phone] and ask to speak to the Oncology doctor on call.     If you are having any concerning symptoms or wish to speak to a provider before your next infusion visit, please call your care coordinator or triage to notify them so we can adequately serve you.     If you need a refill on a narcotic prescription or other medication, please call triage before your infusion appointment.       November 2022 Sunday Monday Tuesday Wednesday Thursday Friday Saturday             1     2     3     4     5       6     7     8     9     10    LAB PERIPHERAL  12:30 PM   (15 min.)   [facility]   [facility]    ONC INFUSION 1 HR (60 MIN)   1:00 PM   (60 min.)    ONC INFUSION NURSE   [facility] 11    VIDEO VISIT RETURN   3:15 PM   (60 min.)   Effects, [facility] Bmt Transplant Late   [facility] [place] 12       13     14     15     16     17     18     19       20     21     22     23     24     25     26       27     28     29     30 December 2022 Sunday Monday Tuesday Wednesday Thursday Friday Saturday                       1    PET ONCOLOGY WHOLE BODY   7:00 AM   (30 min.)   [email or web address]   [facility]    LAB PERIPHERAL  10:00 AM   (15 min.)    [facility]   [facility]    BMT RETURN  10:30 AM   (30 min.)    BMT [place] #1   M  [facility] [place]    UM BMT BONE MARROW BIOPSY   1:00 PM   (90 min.)    ASC BONE MARROW BIOPSY DOR[place]   [facility] [place]    BIOPSY, BONE MARROW   1:00 PM   Bibi Salazar CNP   [facility] OR    Outpatient Visit   1:00 PM   [facility] 2     3       4     5    BMT ANNIVERSARY VISIT   2:30 PM   (30 min.)   UC BMT DOM   [facility] [place] 6     7     8     9     10       11     12     13     14     15     16     17       18     19     20     21     22     23     24       25     26     27     28     29     30     31                       Lab Results:  Recent Results (from the past 12 hour(s))   Comprehensive metabolic panel    Collection Time: 11/10/22  1:10 PM   Result Value Ref Range    Sodium 137 136 - 145 mmol/L    Potassium 3.6 3.4 - 5.3 mmol/L    Chloride 104 98 - 107 mmol/L    Carbon Dioxide (CO2) 21 (L) 22 - 29 mmol/L    Anion Gap 12 7 - 15 mmol/L    Urea Nitrogen 16.4 6.0 - 20.0 mg/dL    Creatinine 1.30 (H) 0.67 - 1.17 mg/dL    Calcium 9.1 8.6 - 10.0 mg/dL    Glucose 145 (H) 70 - 99 mg/dL    Alkaline Phosphatase 53 40 - 129 U/L    AST 24 10 - 50 U/L    ALT 12 10 - 50 U/L    Protein Total 6.5 6.4 - 8.3 g/dL    Albumin 4.5 3.5 - 5.2 g/dL    Bilirubin Total 0.4 <=1.2 mg/dL    GFR Estimate 65 >60 mL/min/1.73m2   Magnesium    Collection Time: 11/10/22  1:10 PM   Result Value Ref Range    Magnesium 1.9 1.7 - 2.3 mg/dL   Phosphorus    Collection Time: 11/10/22  1:10 PM   Result Value Ref Range    Phosphorus 2.6 2.5 - 4.5 mg/dL   Protein  random urine    Collection Time: 11/10/22  1:10 PM   Result Value Ref Range    Total Protein Urine mg/dL 14.6 mg/dL    Total Protein UR MG/MG CR 0.08 0.00 - 0.20 mg/mg Cr    Creatinine Urine mg/dL 182.0 mg/dL   CBC with platelets and differential    Collection Time: 11/10/22  1:10 PM   Result Value Ref Range    WBC Count 5.0  4.0 - 11.0 10e3/uL    RBC Count 3.91 (L) 4.40 - 5.90 10e6/uL    Hemoglobin 11.6 (L) 13.3 - 17.7 g/dL    Hematocrit 35.3 (L) 40.0 - 53.0 %    MCV 90 78 - 100 fL    MCH 29.7 26.5 - 33.0 pg    MCHC 32.9 31.5 - 36.5 g/dL    RDW 17.2 (H) 10.0 - 15.0 %    Platelet Count 277 150 - 450 10e3/uL    % Neutrophils 53 %    % Lymphocytes 24 %    % Monocytes 12 %    % Eosinophils 10 %    % Basophils 1 %    % Immature Granulocytes 0 %    NRBCs per 100 WBC 0 <1 /100    Absolute Neutrophils 2.7 1.6 - 8.3 10e3/uL    Absolute Lymphocytes 1.2 0.8 - 5.3 10e3/uL    Absolute Monocytes 0.6 0.0 - 1.3 10e3/uL    Absolute Eosinophils 0.5 0.0 - 0.7 10e3/uL    Absolute Basophils 0.1 0.0 - 0.2 10e3/uL    Absolute Immature Granulocytes 0.0 <=0.4 10e3/uL    Absolute NRBCs 0.0 10e3/uL

## 2022-11-10 NOTE — ORAL ONC MGMT
Oral Chemotherapy Monitoring Program  Lab Follow Up    Reviewed lab results from 11/10/22.    ORAL CHEMOTHERAPY 7/15/2022 8/10/2022 9/7/2022 10/5/2022 11/4/2022 11/4/2022 11/10/2022   Assessment Type Refill Refill Refill Refill Refill Monthly Follow up Lab Monitoring   Stop Date - - - - - - -   Reason for Discontinuation - - - - - - -   Diagnosis Code Multiple Myeloma Multiple Myeloma Multiple Myeloma Multiple Myeloma Multiple Myeloma Multiple Myeloma Multiple Myeloma   Providers Dr. George Vazquez   [facility]   Drug Name Revlimid (lenalidomide) Revlimid (lenalidomide) Revlimid (lenalidomide) Revlimid (lenalidomide) Revlimid (lenalidomide) Revlimid (lenalidomide) Revlimid (lenalidomide)   Dose 10 mg - 10 mg 10 mg 10 mg 10 mg 10 mg   Current Schedule Daily - Daily Daily Daily Daily Daily   Cycle Details Continuous - Continuous Continuous Continuous Continuous Continuous   Start Date of Last Cycle - - - - - - -   Planned next cycle start date - - - - - - -   Doses missed in last 2 weeks - - - - - - -   Adherence Assessment - - - - - - -   Adverse Effects - - - - - - -   Any new drug interactions? - - - - - - -   Is the dose as ordered appropriate for the patient? - - - - - - -   Since the last time we talked, have you been hospitalized or used the emergency room? - - - - - - -       Labs:  _  Result Component Current Result Ref Range   Sodium 137 (11/10/2022) 136 - 145 mmol/L     _  Result Component Current Result Ref Range   Potassium 3.6 (11/10/2022) 3.4 - 5.3 mmol/L     _  Result Component Current Result Ref Range   Calcium 9.1 (11/10/2022) 8.6 - 10.0 mg/dL     _  Result Component Current Result Ref Range   Magnesium 1.9 (11/10/2022) 1.7 - 2.3 mg/dL     _  Result Component Current Result Ref Range   Phosphorus 2.6 (11/10/2022) 2.5 - 4.5 mg/dL     _  Result Component Current Result Ref Range    Albumin 4.5 (11/10/2022) 3.5 - 5.2 g/dL     _  Result Component Current Result Ref Range   Urea Nitrogen 16.4 (11/10/2022) 6.0 - 20.0 mg/dL     _  Result Component Current Result Ref Range   Creatinine 1.30 (H) (11/10/2022) 0.67 - 1.17 mg/dL     _  Result Component Current Result Ref Range   AST 24 (11/10/2022) 10 - 50 U/L     _  Result Component Current Result Ref Range   ALT 12 (11/10/2022) 10 - 50 U/L     _  Result Component Current Result Ref Range   Bilirubin Total 0.4 (11/10/2022) <=1.2 mg/dL     _  Result Component Current Result Ref Range   WBC Count 5.0 (11/10/2022) 4.0 - 11.0 10e3/uL     _  Result Component Current Result Ref Range   Hemoglobin 11.6 (L) (11/10/2022) 13.3 - 17.7 g/dL     _  Result Component Current Result Ref Range   Platelet Count 277 (11/10/2022) 150 - 450 10e3/uL     No results found for ANC within last 30 days.     _  Result Component Current Result Ref Range   Absolute Neutrophils 2.7 (11/10/2022) 1.6 - 8.3 10e3/uL        Assessment & Plan:  Results are stable with no new concerning abnormalities.    dscout message sent to patient.      Follow-Up:  Labs and DOROTA appointment on 12/1/22/    Sheeba Jaime, PharmD, BCPS  Hematology/Oncology Clinical Pharmacist  Oral Chemotherapy Monitoring Program  [facility]  [phone]

## 2022-11-10 NOTE — PROGRESS NOTES
Infusion Nursing Note:  [facility] KINDRA Ronquillo presents today for  Darzalex Faspro/Zometa    Patient seen by provider today: No   present during visit today: Not Applicable.    Note: Patient reports runny nose and dry cough started yesterday. States that could be the weather. Patient would like to monitor for now and verbalized knowledge on where and when to go if symptoms persists/worsen. Denies fever/chills/sorethroat.  No new concerns made. Otherwise well.     As per Brittany Torres RNCC, will call patient for Bactrim duration, infusion appointment and possible IGG infusion. Patient verbalized understanding.      Intravenous Access:  Peripheral IV placed.    Treatment Conditions:  Lab Results   Component Value Date    HGB 11.6 (L) 11/10/2022    WBC 5.0 11/10/2022    ANEU 3.0 07/12/2022    ANEUTAUTO 2.7 11/10/2022     11/10/2022      Lab Results   Component Value Date     11/10/2022    POTASSIUM 3.6 11/10/2022    MAG 1.9 11/10/2022    CR 1.30 (H) 11/10/2022    HARDEEP 9.1 11/10/2022    BILITOTAL 0.4 11/10/2022    ALBUMIN 4.5 11/10/2022    ALT 12 11/10/2022    AST 24 11/10/2022     Results reviewed, labs MET treatment parameters, ok to proceed with treatment.    Post Infusion Assessment:  Patient tolerated infusion without incident.  Patient tolerated one Darzalex Faspro on left lower abdomeninjection without incident.  Blood return noted pre and post infusion.  Site patent and intact, free from redness, edema or discomfort.  No evidence of extravasations.  Access discontinued per protocol.     Discharge Plan:   Patient declined prescription refills.  Discharge instructions reviewed with: Patient.  Patient and/or family verbalized understanding of discharge instructions and all questions answered.  AVS to patient via [employer].  Patient will return one month for next appointment.   Patient discharged in stable condition accompanied by: self.  Departure Mode: Ambulatory.      WILIAM PELAYO  RN

## 2022-11-11 ENCOUNTER — VIRTUAL VISIT (OUTPATIENT)
Dept: TRANSPLANT | Facility: CLINIC | Age: 52
End: 2022-11-11
Attending: NURSE PRACTITIONER
Payer: COMMERCIAL

## 2022-11-11 DIAGNOSIS — R68.82 DECREASED LIBIDO: ICD-10-CM

## 2022-11-11 DIAGNOSIS — C90.01 MULTIPLE MYELOMA IN REMISSION (H): ICD-10-CM

## 2022-11-11 DIAGNOSIS — Z92.21 STATUS POST CHEMOTHERAPY: Primary | ICD-10-CM

## 2022-11-11 DIAGNOSIS — Z94.84 HISTORY OF PERIPHERAL STEM CELL TRANSPLANT (H): ICD-10-CM

## 2022-11-11 DIAGNOSIS — N52.9 ERECTILE DYSFUNCTION, UNSPECIFIED ERECTILE DYSFUNCTION TYPE: ICD-10-CM

## 2022-11-11 LAB
ALBUMIN SERPL ELPH-MCNC: 4.3 G/DL (ref 3.7–5.1)
ALPHA1 GLOB SERPL ELPH-MCNC: 0.3 G/DL (ref 0.2–0.4)
ALPHA2 GLOB SERPL ELPH-MCNC: 0.5 G/DL (ref 0.5–0.9)
B-GLOBULIN SERPL ELPH-MCNC: 0.7 G/DL (ref 0.6–1)
GAMMA GLOB SERPL ELPH-MCNC: 0.3 G/DL (ref 0.7–1.6)
IGA SERPL-MCNC: 83 MG/DL (ref 84–499)
IGG SERPL-MCNC: 364 MG/DL (ref 610–1616)
IGM SERPL-MCNC: 44 MG/DL (ref 35–242)
KAPPA LC FREE SER-MCNC: 0.98 MG/DL (ref 0.33–1.94)
KAPPA LC FREE/LAMBDA FREE SER NEPH: 1.17 {RATIO} (ref 0.26–1.65)
LAMBDA LC FREE SERPL-MCNC: 0.84 MG/DL (ref 0.57–2.63)
M PROTEIN SERPL ELPH-MCNC: 0.1 G/DL
PROT ELPH PNL UR ELPH: NORMAL
PROT PATTERN SERPL ELPH-IMP: ABNORMAL
PROT PATTERN SERPL IFE-IMP: NORMAL

## 2022-11-11 PROCEDURE — 99215 OFFICE O/P EST HI 40 MIN: CPT | Mod: 95

## 2022-11-11 NOTE — PROGRESS NOTES
June is a 52 year old who is being evaluated via a billable video visit.      How would you like to obtain your AVS? MyChart  If the video visit is dropped, the invitation should be resent by: Text to cell phone: [phone]  Will anyone else be joining your video visit? Ashley GIMENEZ    Video-Visit Details    Video Start Time: 3:30 pm    Type of service:  Video Visit    Video End Time:4:15 pm    Originating Location (pt. Location): Home        Distant Location (provider location):  Off-site    Platform used for Video Visit: Qulsar

## 2022-11-11 NOTE — PROGRESS NOTES
BMT 1-Year Post-Autologous BMT  Survivorship Care Plan        Date: November 11, 2022    Treatment Team:  Patient Care Team:  Selma Johnson APRN CNP as PCP - General (Nurse Practitioner - Family)  Jose Ronquillo MD as MD (Dermatology)  Barbi Vazquez MD as MD (Hematology & Oncology)  Lazaro Rayo MD as MD (Dermapathology)  Barbi Vazquez MD as Assigned Cancer Care Provider  Makeda Elder MD as MD (Endocrinology, Diabetes, and Metabolism)  Tato Dominique DO as Assigned Musculoskeletal Provider  [facility]  ([facility] - [facility])  Makeda Elder MD as Assigned Endocrinology Provider  Cassie García MD as BMT Physician (Hematology & Oncology)  Clarita Sepulveda MD as Assigned Surgical Provider  Luis Liriano RN as BMT Nurse Coordinator  Costa Mccoy MD as MD (Dermatology)  Cassie García MD as Referring Physician (Hematology & Oncology)  Dominguez Craig MD as Assigned Heart and Vascular Provider  Chantel Baldwin MD as Assigned PCP  Lina Matute AuD as Audiologist (Audiology)  Nissen, Rick L, MD as MD (Otolaryngology)  Christina Zarate, RN as Specialty Care Coordinator (Hematology & Oncology)    Date of Transplant: 11/11/2021    Transplant Essential Data:   Diagnosis MM Multiple myeloma  BMTCT Type Autologous    Prep Regimen melphalan  Donor Source Autologous    GVHD Prophylaxis None  Primary BMT MD Dr. Means/Du Newsome    Clinical Trials   none              Diagnosis and Treatment Summary      Disease presentation and baseline characteristics: nontraumatic rib fracture     Date Treatment Name Response Side Effects / Toxicities    May to October 2021 RVD with daratumumab VGPR Peripheral neuropathy     11/11/2021 High dose melphalan prep followed by single autologous stem cell rescue. Melphalan dose  200mg/m2, CD34 cell dose  6.99x10^6 Day 100 PET/CT CITLALLI, BMB CITLALLI, and biochemica markers CR Neutropenic fevers, abdominal  pain, nausea and diarrhea    RMD for maintainence Pending Tolerating well            Self-Efficacy for Managing Survivorship   6-Item Scale  Modified for the [facility]    We would like to know how confident you are in doing certain activities after your blood or marrow transplant (BMT). For each of the following questions, please choose the number that corresponds to your confidence that you can do the tasks regularly at the present time.     1 = Not Confident   5 = Moderately Confident   10=Totally Confident                                             1. How confident are you that you can keep the fatigue caused by your treatment from interfering with the things you want to do?    10   2. How confident are you that you can keep the physical discomfort or pain from your treatment from interfering with the things you want to do?   10   3. How confident are you that you can keep the emotional distress caused by your treatment from interfering with the things you want to do?    10   4. How confident are you that you can keep any other symptoms or health problems you have from interfering with the things you want to do?   10   5. How confident are you that you can do the different tasks and activities needed to manage your health condition so as to reduce your need to see a doctor?    10   6. How confident are you that you can do things other than just taking medication to reduce how much your condition affects your everyday life?    10     Scale modified from:  The [employer]. (2001). Self-efficacy for managing chronic disease 6-item scale. Retrieved from http://patienteducation.[place].edu/research/secd6.pdf     General Health Maintenance:     Vaccinations should be given at 1 and 2 years after your transplant, these may be given at your annual anniversary visits in the BMT clinic, by your primary care provider, or your local oncologist. An  exception is the influenza vaccine, which can be given after day +60 post-transplant during influenza season. See table below for more information.     You can receive the COVID19 vaccine if you have not already, for more information on how to sign up for an appointment you can the [employer] vaccine website at [email or web address] or the         [place] Department of Health Vaccine Connector portal at https://[place].gov/covid19/vaccine/connector/connector.jsp    For general health concerns you can be seen by your primary care provider.     If you have questions about your transplant or follow up tests contact your BMT RN coordinator.        Vaccine Administration Schedule       Vaccinations Post-BMT: Please refer to our [facility] BMT Vaccination Guidelines updated in March of 2021.         Survivorship Care Plan:     This individualized care plan is designed to inform you and your healthcare team of the recommended follow-up visits, tests, health maintenance activities, and cancer screening you should receive after transplant.     Immune System:  Risks Preventative Measures Recommendations   Infections   Symptoms of a cold such as fever, cough, congestion, and shortness of breath should be reported to your primary care provider    Immunizations will be administered at 1 & 2 years after transplant. See table above. None at this time     He will likely receive his vaccines in person on 12/8. He will call to make sure this is an in-person visit.     Eyes:   Risks Preventative Measures Recommendations   Cataracts, dry eyes, viral infections, and other eye changes   Yearly eye exam     Screening and treatment for high blood pressure or diabetes    Call if you have eye pain, visual changes, or floaters immediately Patient will schedule an annual routine exam with community ophthalmologist     Mouth:  Risks Preventative Measures Recommendations   Dry mouth, cavities, and oral cancer   You  can use over the counter Biotene for dry mouth or try sucking on sour candy before meals    Get a dental checkup every year and a cleaning every 6 months    If you have a prosthetic heart valve or central venous catheter or  port , you may need to take an antibiotic before your dental visits None at this time, patient has dental provider and will schedule routine exam         Lungs  Risks Preventative Measures Recommendations   Changes in function from chemotherapy or radiation; lung infections (pneumonia)   Tell your provider about difficulty breathing, a cough, or new shortness of breath     Avoid use of tobacco products or smoking    Routine lung exams Pulmonary Function Tests (Recommended annually post-transplant)    Order placed today. Will try to have the test scheduled on 12/5 when he is on campus for his other tests.     Heart and Blood Vessels  Heart Disease Risk Score: The ASCVD Risk score (Serjio ALCALA, et al., 2019) failed to calculate for the following reasons:    The patient has a prior MI or stroke diagnosis  Risks Preventative Measures Recommendations   Damage from chemotherapy and/or radiation; early development of heart valve disease    Ask your provider if you should receive consultation from a cardiologist (heart doctor) or have special screening    Follow a  heart healthy  lifestyle. For more information visit the National Heart, Lung, and Blood [place] website at: https://www.nhlbi.nih.gov/health/health-topics/topics/heart-healthy-lifestyle-changes     Don t smoke. If you currently smoke and are ready to quit, we can help you find ways to quit    Maintain a healthy weight    Exercise regularly    Avoid foods that have high amounts of:  o Salt/sodium (less than 2,300 mg of sodium per day)  o Saturated and trans fats  o Limit alcohol to less than 1 drink for women and 2 drinks for men per day  o Sugar such as soft drinks or sugary snacks Fasting Lipid Profile or Direct LDL (Recommended annually)      Followed closely by a cardiologist who monitors full lipid panel           Hormones  Risks Preventative Measures Recommendations   Low thyroid function, low function of other glands (adrenals and others)   Certain endocrine/hormone disorders are more common after transplant. For this reason, talk to your provider about:  o Fatigue  o Muscle weakness  o Changes in cold tolerance  o Reduced interest in sex  o Erectile dysfunction     Certain tests may be used to monitor for hormone changes if you are experiencing symptoms. These tests are done at 1 year Testosterone/FSH/LH (Men, Ordered based on symptoms), Fasting Glucose (Recommended 6 mos & annually post-transplant) and Hgb A1C (Recommended annually post-transplant)     Order placed for a Testosterone test to be drawn on 12/5 with other labs       Liver:  Risks Preventative Measures Recommendations    Damage from chemotherapy or other drugs, buildup of iron from blood transfusions, and infections   Certain tests may be ordered at your next survivorship visit to evaluate liver function based on your individual risk factors.    Talk to your provider before taking herbal supplements or over the counter drugs like Tylenol.    Ask your doctor if you should be treated for iron overload    Avoid alcohol in excess   Hepatic Panel/LFTs (Recommended every 3-6 mos the 1st year post-transplant & annually)       Kidneys and Bladder:  Risks Preventative Measures Recommendations   Damage from chemotherapy or other drugs, infections, high blood pressure   Monitoring blood tests of kidney function during follow up visits    Treating high blood pressure and diabetes     Drink adequate amounts of water    Report symptoms of infection such as frequent urination, pain with urination, foul odor to urine, or blood in the urine    Talk to your provider before taking herbal supplements or over the counter drugs like Ibuprofen Serum creatinine checked as part of anniversary labs or at  least annually by primary provider       Nervous System:  Risks Preventative Measures Recommendations   Neuropathy from chemotherapy, changes in cognitive function   Report changes in sensation or discomfort in feet or hands    Tell your provider about ongoing changes in memory, ability to concentrate, or inability to make decisions   None at this time       Muscle & Connective Tissue  Risks Preventative Measures Recommendations   Reduced muscle strength & stamina   Let your provider know if:  o You notice changes in your muscle strength  o Require extra assistance with daily activities  o Need help creating an exercise routine  o Notice reduced range of motion of the arms, hips, or legs    Follow general guidelines for physical activity as recommended by the Office of Disease Prevention & Health Promotion:    Avoid Inactivity  Some physical activity is better than none -- any amount has benefits.    Do Aerobic Activity  Do aerobic physical activity in episodes of at least 10 minutes, as many times as possible per day. This could include going for walks or using the elliptical or stationary bike.  Ask your doctor what aerobic activities would be safe and helpful for you, and set a goal for yourself!    Strengthen Muscles  Do muscle-strengthening activities (such as lifting light weights or using resistance bands and/or going up and down stairs) that are moderate or high intensity and involve all major muscle groups at least 4 days a week. Bone Scan (Recommended 1 year) and Calcium & Vitamin D Levels (Recommended annually)     He will have the bone density scan scheduled through his oncologist.    Recently had vitamin D sceening (May of 2022) with normal results. Continues over the counter therapy.         Emotional Health  Risks Preventative Measures Recommendations   Stress, depression, anxiety   Talk to your provider about:  o Changes in feelings, mood, or emotional wellbeing  o Interest in support groups  o If  you are concerned about your caregivers emotional wellbeing  o Desire to speak with a counselor for ongoing support None at this time       Sexual Health  Risks Preventative Measures Recommendations   Reduced libido due to hormonal changes, erectile dysfunction, vaginal dryness, and sexually transmitted diseases  Talk to your provider about:    Reduced libido or concerns regarding your sexual health    Men: Erectile dysfunction     Women: Vaginal dryness, pain during intercourse, vaginal bleeding after intercourse, changes in vaginal discharge that may indicate infection (green/white/foul odor)   General Recommendations:    It is safe to have sex if your platelet count is > 50,000 and you feel physically and emotionally ready     Women can use water-based lubricants to reduce discomfort from dryness. Prescription topical estrogen may help as well.    Use barrier protection such as condoms to prevent sexually transmitted diseases, you are at higher risk for infections due to a weakened immune system    For more information check out the National Marrow Donor Program web page on this topic:  https://[employer].org/patients-and-families/life-after-transplant/coping-with-life-after-transplant/relationships-and-sexual-health/  FSH/LH/Testosterone (Men experiencing ED or reduced libido)         Cancer Screening:  Risks Preventative Measures Recommendations   Higher risk for the development of solid tumors, PTLD, and blood cancers   Preform a full self skin exam monthly to assess for any changes in moles or signs of skin cancer    Minimize excessive sun exposure and wear sunscreen    Women should preform breast-self exams and report any changes in such as a new lump, discharge from nipples, and red or dimpled areas of the breast.     Screening for colorectal cancer should begin at age 50.     All women should have a pap smear, assessment for vaginal GVHD, and HPV DNA test every year beginning at age 21    Men should  perform a monthly testicular self-exam if they have been exposed to radiation as part of their cancer treatment.    Colonoscopy (Recommended every 10 years after the age of 50) and Other continue to see a dermatologist annually for a full skin check         Recommended Tests & Follow-Up:  Referrals & Tests Ordered Today:  Your BMT physician will review these tests and referral results. If you require treatment based on the results, a member of the BMT team will contact you.  Orders placed today:  Orders Placed This Encounter   Procedures     FSH     LH     Testosterone     General PFT Lab (Please always keep checked)     Pulmonary Function Test     (Note to providers: After signing orders use the refresh tool in the note window to display results)   Future Tests/Referrals:   All recommendations above should be completed within 2 months either by your primary care provider or local oncologist (cancer doctor).   Recommendations that were not ordered today should be completed by your:  Primary Care Provider and Local Oncologist   Follow Up Care:  Continue to see your care team members routinely after transplant.  BMT Provider: Keep your appointment scheduled on 12/5 for labs and provider visit on 12/8.  Hematologist/Oncologist: Continue to follow up as scheduled. Discuss bone density scan with your primary oncologist.  Primary Care Provider: Call to schedule an appointment with your PCP.     PATTY Morfin CNP    I spent 45  minutes with the patient, over half of which was spent discussing preventative care strategies, self-management practices, and potential complications after transplant.      Information used for these recommendations was obtained from: Shreyas N. S., COLLIN Delaney, ABBEY Figueroa, CLARY Maldonado, RODNEY Wright., Jannet, SHWETA GR.,   Rodriguez, K. M. (2013). Prevalence of Hematopoietic Cell Transplant Survivors in the [place]. Biology of Blood and Marrow Transplantation?: Journal of the American Society  for Blood and Marrow Transplantation, 19(10), 5402-7893. doi 10.1016/j.bbmt.2013.07.020
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
## 2022-11-14 LAB
ALBUMIN MFR UR ELPH: 31.8 %
ALPHA1 GLOB MFR UR ELPH: 23.1 %
ALPHA2 GLOB MFR UR ELPH: 9.2 %
B-GLOBULIN MFR UR ELPH: 20.1 %
DEPRECATED CALCIDIOL+CALCIFEROL SERPL-MC: <46 UG/L (ref 20–75)
GAMMA GLOB MFR UR ELPH: 15.8 %
M PROTEIN MFR UR ELPH: 0 %
PROT PATTERN UR ELPH-IMP: ABNORMAL
VITAMIN D2 SERPL-MCNC: <5 UG/L
VITAMIN D3 SERPL-MCNC: 41 UG/L

## 2022-11-15 ENCOUNTER — TELEPHONE (OUTPATIENT)
Dept: ONCOLOGY | Facility: CLINIC | Age: 52
End: 2022-11-15

## 2022-11-15 ENCOUNTER — TELEPHONE (OUTPATIENT)
Dept: TRANSPLANT | Facility: CLINIC | Age: 52
End: 2022-11-15

## 2022-11-15 NOTE — TELEPHONE ENCOUNTER
"Nurse Triage SBAR    Situation: not feeling well, cough, diarrhea    Background:    DX: multiple myeloma  Provider: Dr. Vazquez  Most recent treatment: 11/10/2022 Darzalex, daily revlimid po  Most recent appointment: 10/11/2022 Dr Vazquez  Upcoming appointments:     States he feels like he has been \"working on a cold\" and review of 11/10 nursing note when he presented for treatment he reported runny nose and dry cough that started on 11/9/2022 that he was attributing to the weather.     He states today that he has had the dry cough for three days.  This is non-productive.  He denies chest pain or shortness of breath.    He denies fevers or shaking chills  Maybe slight fever one day but he took his temp and it was 98.    He developed loose stools today and reports about 5 loose stools.  He denies abd pain or cramping with these.  He denies black or tarry stool or blood in his stool.  He has not tried an anti-diarrheal medication.     Prior to today he had no sick contacts at home but now family members are also coughing.    Recommendation:   He is advised to take a home covid test now and call or send [employer] message with results.    He will continue to push fluids, rest, and monitor for temp >100.4.    Message routed to team for further recommendations.     Message from provider 11/16/2022:  Can you follow-up with him today and see if his symptoms are progressing or improving?      Note: He was asked at the time of the original call to do a covid test at home and call with results.     Left message to please call the triage line at [phone] option 5 option 2 and ask to speak to a triage nurse regarding your my chart message.    .    "

## 2022-11-15 NOTE — TELEPHONE ENCOUNTER
Patient is followed by [facility]. Spoke to patient and explained that since he follows routinely with H/O and only sees us for anniversaries they should address his symptoms. A warm transfer to [facility] RN Triage was made.

## 2022-11-17 ENCOUNTER — NURSE TRIAGE (OUTPATIENT)
Dept: ONCOLOGY | Facility: CLINIC | Age: 52
End: 2022-11-17

## 2022-11-17 NOTE — TELEPHONE ENCOUNTER
[facility] Triage    Incoming call: Follow up - ill since Sunday 11/13/22    Continues to have headache and cough, has gray to green sputum when he coughs up,  he is taking 1000 mg of tylenol every 4-6 hours, states he doesn't have a fever and took his temp before he took the tyl,no sore throat no nausea, no vomiting, no pain, good appetite, voiding and stooling, drinks over 80 ounces a day and will make sure he does.    Patient is in [place], [place]    Advised to continue to take tylenol, push fluids rest and will call patient back with what Dr. Vazquez would like done.    Paged Dr. Vazquez at 1005 am to advise    Routing to Dr. Vazquez and Brittany Zarate

## 2022-11-19 ENCOUNTER — OFFICE VISIT (OUTPATIENT)
Dept: FAMILY MEDICINE | Facility: CLINIC | Age: 52
End: 2022-11-19
Payer: COMMERCIAL

## 2022-11-19 ENCOUNTER — HOSPITAL ENCOUNTER (OUTPATIENT)
Dept: GENERAL RADIOLOGY | Facility: HOSPITAL | Age: 52
Discharge: HOME | End: 2022-11-19
Attending: FAMILY MEDICINE | Admitting: FAMILY MEDICINE
Payer: COMMERCIAL

## 2022-11-19 VITALS
HEART RATE: 84 BPM | TEMPERATURE: 98.7 F | OXYGEN SATURATION: 98 % | DIASTOLIC BLOOD PRESSURE: 88 MMHG | SYSTOLIC BLOOD PRESSURE: 165 MMHG

## 2022-11-19 DIAGNOSIS — Z94.84 HISTORY OF PERIPHERAL STEM CELL TRANSPLANT (H): ICD-10-CM

## 2022-11-19 DIAGNOSIS — J02.9 SORE THROAT: ICD-10-CM

## 2022-11-19 DIAGNOSIS — R05.1 ACUTE COUGH: Primary | ICD-10-CM

## 2022-11-19 DIAGNOSIS — Z20.818 STREP THROAT EXPOSURE: ICD-10-CM

## 2022-11-19 DIAGNOSIS — R05.1 ACUTE COUGH: ICD-10-CM

## 2022-11-19 DIAGNOSIS — J20.9 ACUTE BRONCHITIS, UNSPECIFIED ORGANISM: ICD-10-CM

## 2022-11-19 LAB
DEPRECATED S PYO AG THROAT QL EIA: NEGATIVE
GROUP A STREP BY PCR: NOT DETECTED

## 2022-11-19 PROCEDURE — 87651 STREP A DNA AMP PROBE: CPT | Performed by: FAMILY MEDICINE

## 2022-11-19 PROCEDURE — 99214 OFFICE O/P EST MOD 30 MIN: CPT | Performed by: FAMILY MEDICINE

## 2022-11-19 PROCEDURE — 71046 X-RAY EXAM CHEST 2 VIEWS: CPT

## 2022-11-19 RX ORDER — AZITHROMYCIN 250 MG/1
TABLET, FILM COATED ORAL
Qty: 6 TABLET | Refills: 0 | Status: SHIPPED | OUTPATIENT
Start: 2022-11-19 | End: 2022-11-24
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
## 2022-12-02 ENCOUNTER — ANESTHESIA EVENT (OUTPATIENT)
Dept: SURGERY | Facility: AMBULATORY SURGERY CENTER | Age: 52
End: 2022-12-02
Payer: COMMERCIAL

## 2022-12-05 ENCOUNTER — ONCOLOGY VISIT (OUTPATIENT)
Dept: TRANSPLANT | Facility: CLINIC | Age: 52
End: 2022-12-05
Attending: NURSE PRACTITIONER
Payer: COMMERCIAL

## 2022-12-05 ENCOUNTER — HOSPITAL ENCOUNTER (OUTPATIENT)
Dept: PET IMAGING | Facility: CLINIC | Age: 52
Setting detail: NUCLEAR MEDICINE
Discharge: HOME | End: 2022-12-05
Attending: INTERNAL MEDICINE
Payer: COMMERCIAL

## 2022-12-05 ENCOUNTER — HOSPITAL ENCOUNTER (OUTPATIENT)
Facility: AMBULATORY SURGERY CENTER | Age: 52
Discharge: HOME | End: 2022-12-05
Attending: NURSE PRACTITIONER
Payer: COMMERCIAL

## 2022-12-05 ENCOUNTER — LAB (OUTPATIENT)
Dept: LAB | Facility: CLINIC | Age: 52
End: 2022-12-05
Attending: NURSE PRACTITIONER
Payer: COMMERCIAL

## 2022-12-05 ENCOUNTER — ANESTHESIA (OUTPATIENT)
Dept: SURGERY | Facility: AMBULATORY SURGERY CENTER | Age: 52
End: 2022-12-05
Payer: COMMERCIAL

## 2022-12-05 ENCOUNTER — HOSPITAL ENCOUNTER (OUTPATIENT)
Dept: PET IMAGING | Facility: CLINIC | Age: 52
Discharge: HOME | End: 2022-12-05
Attending: INTERNAL MEDICINE
Payer: COMMERCIAL

## 2022-12-05 VITALS
TEMPERATURE: 98.1 F | BODY MASS INDEX: 24.83 KG/M2 | HEIGHT: 65 IN | HEART RATE: 72 BPM | OXYGEN SATURATION: 100 % | RESPIRATION RATE: 16 BRPM | SYSTOLIC BLOOD PRESSURE: 124 MMHG | DIASTOLIC BLOOD PRESSURE: 72 MMHG | WEIGHT: 149 LBS

## 2022-12-05 VITALS
OXYGEN SATURATION: 98 % | BODY MASS INDEX: 24.91 KG/M2 | HEART RATE: 71 BPM | SYSTOLIC BLOOD PRESSURE: 128 MMHG | WEIGHT: 149.7 LBS | DIASTOLIC BLOOD PRESSURE: 84 MMHG | TEMPERATURE: 97.8 F | RESPIRATION RATE: 16 BRPM

## 2022-12-05 DIAGNOSIS — C90.00 MULTIPLE MYELOMA NOT HAVING ACHIEVED REMISSION (H): ICD-10-CM

## 2022-12-05 DIAGNOSIS — Z94.84 HISTORY OF PERIPHERAL STEM CELL TRANSPLANT (H): ICD-10-CM

## 2022-12-05 DIAGNOSIS — Z92.21 STATUS POST CHEMOTHERAPY: ICD-10-CM

## 2022-12-05 DIAGNOSIS — C90.01 MULTIPLE MYELOMA IN REMISSION (H): ICD-10-CM

## 2022-12-05 DIAGNOSIS — N52.9 ERECTILE DYSFUNCTION, UNSPECIFIED ERECTILE DYSFUNCTION TYPE: ICD-10-CM

## 2022-12-05 DIAGNOSIS — R68.82 DECREASED LIBIDO: ICD-10-CM

## 2022-12-05 LAB
ALBUMIN SERPL BCG-MCNC: 4.3 G/DL (ref 3.5–5.2)
ALP SERPL-CCNC: 60 U/L (ref 40–129)
ALT SERPL W P-5'-P-CCNC: 15 U/L (ref 10–50)
ANION GAP SERPL CALCULATED.3IONS-SCNC: 11 MMOL/L (ref 7–15)
AST SERPL W P-5'-P-CCNC: 24 U/L (ref 10–50)
BASOPHILS # BLD AUTO: 0.1 10E3/UL (ref 0–0.2)
BASOPHILS NFR BLD AUTO: 1 %
BILIRUB SERPL-MCNC: 0.4 MG/DL
BUN SERPL-MCNC: 10.8 MG/DL (ref 6–20)
CALCIUM SERPL-MCNC: 8.5 MG/DL (ref 8.6–10)
CHLORIDE SERPL-SCNC: 104 MMOL/L (ref 98–107)
CREAT SERPL-MCNC: 1.33 MG/DL (ref 0.67–1.17)
DEPRECATED HCO3 PLAS-SCNC: 22 MMOL/L (ref 22–29)
EOSINOPHIL # BLD AUTO: 0.3 10E3/UL (ref 0–0.7)
EOSINOPHIL NFR BLD AUTO: 6 %
ERYTHROCYTE [DISTWIDTH] IN BLOOD BY AUTOMATED COUNT: 16.6 % (ref 10–15)
FSH SERPL IRP2-ACNC: 10.9 MIU/ML (ref 1.5–12.4)
GFR SERPL CREATININE-BSD FRML MDRD: 64 ML/MIN/1.73M2
GLUCOSE SERPL-MCNC: 99 MG/DL (ref 70–99)
HCT VFR BLD AUTO: 34.4 % (ref 40–53)
HGB BLD-MCNC: 11.3 G/DL (ref 13.3–17.7)
IMM GRANULOCYTES # BLD: 0 10E3/UL
IMM GRANULOCYTES NFR BLD: 1 %
INR PPP: 0.98 (ref 0.85–1.15)
LH SERPL-ACNC: 7.6 MIU/ML (ref 1.7–8.6)
LYMPHOCYTES # BLD AUTO: 1.7 10E3/UL (ref 0.8–5.3)
LYMPHOCYTES NFR BLD AUTO: 38 %
MCH RBC QN AUTO: 29.8 PG (ref 26.5–33)
MCHC RBC AUTO-ENTMCNC: 32.8 G/DL (ref 31.5–36.5)
MCV RBC AUTO: 91 FL (ref 78–100)
MONOCYTES # BLD AUTO: 0.6 10E3/UL (ref 0–1.3)
MONOCYTES NFR BLD AUTO: 12 %
NEUTROPHILS # BLD AUTO: 2 10E3/UL (ref 1.6–8.3)
NEUTROPHILS NFR BLD AUTO: 42 %
NRBC # BLD AUTO: 0 10E3/UL
NRBC BLD AUTO-RTO: 0 /100
PLATELET # BLD AUTO: 338 10E3/UL (ref 150–450)
POTASSIUM SERPL-SCNC: 3.9 MMOL/L (ref 3.4–5.3)
PROT SERPL-MCNC: 6.2 G/DL (ref 6.4–8.3)
RBC # BLD AUTO: 3.79 10E6/UL (ref 4.4–5.9)
RETICS # AUTO: 0.06 10E6/UL (ref 0.03–0.1)
RETICS/RBC NFR AUTO: 1.4 % (ref 0.5–2)
SODIUM SERPL-SCNC: 137 MMOL/L (ref 136–145)
TOTAL PROTEIN SERUM FOR ELP: 6 G/DL (ref 6.4–8.3)
WBC # BLD AUTO: 4.6 10E3/UL (ref 4–11)

## 2022-12-05 PROCEDURE — 70491 CT SOFT TISSUE NECK W/DYE: CPT

## 2022-12-05 PROCEDURE — 85045 AUTOMATED RETICULOCYTE COUNT: CPT

## 2022-12-05 PROCEDURE — 84165 PROTEIN E-PHORESIS SERUM: CPT | Mod: 26

## 2022-12-05 PROCEDURE — 86334 IMMUNOFIX E-PHORESIS SERUM: CPT | Mod: 26

## 2022-12-05 PROCEDURE — 84165 PROTEIN E-PHORESIS SERUM: CPT | Mod: TC | Performed by: PATHOLOGY

## 2022-12-05 PROCEDURE — 36415 COLL VENOUS BLD VENIPUNCTURE: CPT

## 2022-12-05 PROCEDURE — 85025 COMPLETE CBC W/AUTO DIFF WBC: CPT

## 2022-12-05 PROCEDURE — 250N000011 HC RX IP 250 OP 636: Performed by: INTERNAL MEDICINE

## 2022-12-05 PROCEDURE — 78816 PET IMAGE W/CT FULL BODY: CPT | Mod: PS

## 2022-12-05 PROCEDURE — 71260 CT THORAX DX C+: CPT | Mod: 26 | Performed by: RADIOLOGY

## 2022-12-05 PROCEDURE — 74177 CT ABD & PELVIS W/CONTRAST: CPT | Mod: 26 | Performed by: RADIOLOGY

## 2022-12-05 PROCEDURE — 82784 ASSAY IGA/IGD/IGG/IGM EACH: CPT

## 2022-12-05 PROCEDURE — 85610 PROTHROMBIN TIME: CPT

## 2022-12-05 PROCEDURE — 84155 ASSAY OF PROTEIN SERUM: CPT

## 2022-12-05 PROCEDURE — 343N000001 HC RX 343: Performed by: INTERNAL MEDICINE

## 2022-12-05 PROCEDURE — 83521 IG LIGHT CHAINS FREE EACH: CPT

## 2022-12-05 PROCEDURE — 38222 DX BONE MARROW BX & ASPIR: CPT

## 2022-12-05 PROCEDURE — 70491 CT SOFT TISSUE NECK W/DYE: CPT | Mod: 26 | Performed by: RADIOLOGY

## 2022-12-05 PROCEDURE — 82040 ASSAY OF SERUM ALBUMIN: CPT

## 2022-12-05 PROCEDURE — 83002 ASSAY OF GONADOTROPIN (LH): CPT

## 2022-12-05 PROCEDURE — 80053 COMPREHEN METABOLIC PANEL: CPT

## 2022-12-05 PROCEDURE — G0463 HOSPITAL OUTPT CLINIC VISIT: HCPCS

## 2022-12-05 PROCEDURE — 83001 ASSAY OF GONADOTROPIN (FSH): CPT

## 2022-12-05 PROCEDURE — 74177 CT ABD & PELVIS W/CONTRAST: CPT

## 2022-12-05 PROCEDURE — A9552 F18 FDG: HCPCS | Performed by: INTERNAL MEDICINE

## 2022-12-05 PROCEDURE — 78816 PET IMAGE W/CT FULL BODY: CPT | Mod: 26 | Performed by: RADIOLOGY

## 2022-12-05 PROCEDURE — 86334 IMMUNOFIX E-PHORESIS SERUM: CPT | Performed by: PATHOLOGY

## 2022-12-05 PROCEDURE — 84403 ASSAY OF TOTAL TESTOSTERONE: CPT

## 2022-12-05 RX ORDER — HYDROMORPHONE HYDROCHLORIDE 1 MG/ML
0.4 INJECTION, SOLUTION INTRAMUSCULAR; INTRAVENOUS; SUBCUTANEOUS EVERY 5 MIN PRN
Status: DISCONTINUED | OUTPATIENT
Start: 2022-12-05 | End: 2022-12-06 | Stop reason: HOSPADM

## 2022-12-05 RX ORDER — ONDANSETRON 2 MG/ML
4 INJECTION INTRAMUSCULAR; INTRAVENOUS EVERY 30 MIN PRN
Status: DISCONTINUED | OUTPATIENT
Start: 2022-12-05 | End: 2022-12-06 | Stop reason: HOSPADM

## 2022-12-05 RX ORDER — PROPOFOL 10 MG/ML
INJECTION, EMULSION INTRAVENOUS CONTINUOUS PRN
Status: DISCONTINUED | OUTPATIENT
Start: 2022-12-05 | End: 2022-12-05

## 2022-12-05 RX ORDER — LIDOCAINE HYDROCHLORIDE 10 MG/ML
8-10 INJECTION, SOLUTION EPIDURAL; INFILTRATION; INTRACAUDAL; PERINEURAL
Status: DISCONTINUED | OUTPATIENT
Start: 2022-12-05 | End: 2022-12-06 | Stop reason: HOSPADM

## 2022-12-05 RX ORDER — PROPOFOL 10 MG/ML
INJECTION, EMULSION INTRAVENOUS PRN
Status: DISCONTINUED | OUTPATIENT
Start: 2022-12-05 | End: 2022-12-05

## 2022-12-05 RX ORDER — SODIUM CHLORIDE, SODIUM LACTATE, POTASSIUM CHLORIDE, CALCIUM CHLORIDE 600; 310; 30; 20 MG/100ML; MG/100ML; MG/100ML; MG/100ML
INJECTION, SOLUTION INTRAVENOUS CONTINUOUS
Status: DISCONTINUED | OUTPATIENT
Start: 2022-12-05 | End: 2022-12-06 | Stop reason: HOSPADM

## 2022-12-05 RX ORDER — LIDOCAINE 40 MG/G
CREAM TOPICAL
Status: DISCONTINUED | OUTPATIENT
Start: 2022-12-05 | End: 2022-12-06 | Stop reason: HOSPADM

## 2022-12-05 RX ORDER — FENTANYL CITRATE 50 UG/ML
25 INJECTION, SOLUTION INTRAMUSCULAR; INTRAVENOUS EVERY 5 MIN PRN
Status: DISCONTINUED | OUTPATIENT
Start: 2022-12-05 | End: 2022-12-06 | Stop reason: HOSPADM

## 2022-12-05 RX ORDER — ONDANSETRON 4 MG/1
4 TABLET, ORALLY DISINTEGRATING ORAL EVERY 30 MIN PRN
Status: DISCONTINUED | OUTPATIENT
Start: 2022-12-05 | End: 2022-12-06 | Stop reason: HOSPADM

## 2022-12-05 RX ORDER — IOPAMIDOL 755 MG/ML
0-135 INJECTION, SOLUTION INTRAVASCULAR ONCE
Status: COMPLETED | OUTPATIENT
Start: 2022-12-05 | End: 2022-12-05

## 2022-12-05 RX ORDER — FENTANYL CITRATE 50 UG/ML
50 INJECTION, SOLUTION INTRAMUSCULAR; INTRAVENOUS EVERY 5 MIN PRN
Status: DISCONTINUED | OUTPATIENT
Start: 2022-12-05 | End: 2022-12-06 | Stop reason: HOSPADM

## 2022-12-05 RX ORDER — ACETAMINOPHEN 325 MG/1
975 TABLET ORAL ONCE
Status: COMPLETED | OUTPATIENT
Start: 2022-12-05 | End: 2022-12-05

## 2022-12-05 RX ORDER — GABAPENTIN 300 MG/1
300 CAPSULE ORAL
Status: DISCONTINUED | OUTPATIENT
Start: 2022-12-05 | End: 2022-12-06 | Stop reason: HOSPADM

## 2022-12-05 RX ORDER — FENTANYL CITRATE 50 UG/ML
25 INJECTION, SOLUTION INTRAMUSCULAR; INTRAVENOUS
Status: DISCONTINUED | OUTPATIENT
Start: 2022-12-05 | End: 2022-12-06 | Stop reason: HOSPADM

## 2022-12-05 RX ORDER — HYDROMORPHONE HYDROCHLORIDE 1 MG/ML
0.2 INJECTION, SOLUTION INTRAMUSCULAR; INTRAVENOUS; SUBCUTANEOUS EVERY 5 MIN PRN
Status: DISCONTINUED | OUTPATIENT
Start: 2022-12-05 | End: 2022-12-06 | Stop reason: HOSPADM

## 2022-12-05 RX ORDER — MEPERIDINE HYDROCHLORIDE 25 MG/ML
12.5 INJECTION INTRAMUSCULAR; INTRAVENOUS; SUBCUTANEOUS
Status: DISCONTINUED | OUTPATIENT
Start: 2022-12-05 | End: 2022-12-06 | Stop reason: HOSPADM

## 2022-12-05 RX ADMIN — IOPAMIDOL 86 ML: 755 INJECTION, SOLUTION INTRAVENOUS at 09:07

## 2022-12-05 RX ADMIN — PROPOFOL 40 MG: 10 INJECTION, EMULSION INTRAVENOUS at 13:57

## 2022-12-05 RX ADMIN — ACETAMINOPHEN 975 MG: 325 TABLET ORAL at 13:23

## 2022-12-05 RX ADMIN — FLUDEOXYGLUCOSE F-18 9.96 MCI.: 500 INJECTION, SOLUTION INTRAVENOUS at 08:11

## 2022-12-05 RX ADMIN — SODIUM CHLORIDE, SODIUM LACTATE, POTASSIUM CHLORIDE, CALCIUM CHLORIDE: 600; 310; 30; 20 INJECTION, SOLUTION INTRAVENOUS at 13:26

## 2022-12-05 RX ADMIN — PROPOFOL 200 MCG/KG/MIN: 10 INJECTION, EMULSION INTRAVENOUS at 13:57

## 2022-12-05 ASSESSMENT — ENCOUNTER SYMPTOMS
SEIZURES: 0
DYSRHYTHMIAS: 0

## 2022-12-05 ASSESSMENT — PAIN SCALES - GENERAL: PAINLEVEL: NO PAIN (0)

## 2022-12-05 ASSESSMENT — LIFESTYLE VARIABLES: TOBACCO_USE: 0

## 2022-12-05 NOTE — DISCHARGE INSTRUCTIONS
How to Care for your Bone Marrow Biopsy    Activity  Relax and take it easy for the next 24 hours.   Resume regular activity after 24 hours.    Diet   Resume pre-procedure diet and drink plenty of fluids.    If you received sedation, you may feel a little nauseated so start with a clear liquid diet until the nausea passes.    Do Not Immerse Bone Marrow Biopsy Puncture Site in Water  Do not take a bath until the puncture site has healed.  Do not sit in a hot tub or spa until the puncture site has healed.  Do not swim until the puncture site has healed.  Wait 24 hours before taking a shower.    Drainage  Drainage should be minimal.  IF bleeding should occur and soaks through the dressing, lie down and put pressure on the puncture site.    IF bleeding persists, apply gentle pressure with your hand over the dressing for 5 minutes.    IF the pressure doesn't stop the bleeding, contact your provider immediately.    Dressing  Keep the dressing dry and in place for 24 hours, unless instructed otherwise.    IF bleeding soaks through the dressing in the first 24 hours do NOT remove the dressing as you may pull off any scab that has formed.  Instead, reinforce the dressing with extra gauze and tape.    No Alcohol  Do not drink alcoholic beverages for the next 24 hours.    No Driving or Operating Machinery  No driving or operating machinery for the next 24 hours.    Notify your provider IF:    Excessive bleeding or drainage at the puncture site    Excessive swelling, redness or tenderness at the puncture site    Fever above 100.5 degrees taken orally    Severe pain    Drainage that is green, yellow, thick white or has a bad odor    Telephone Numbers  Bone Marrow transplant clinic:  [phone] (Monday thru Friday, 8:00 am to 4:00 pm)  After business hours call the [facility]:  [phone] and ask for the Hematology/BMT doctor on call.  Or call the Emergency Room at the [facility]  [facility]:  [phone].

## 2022-12-05 NOTE — ANESTHESIA PREPROCEDURE EVALUATION
Anesthesia Pre-Procedure Evaluation    Patient: June Ronquillo   MRN: [id number] : 1970        Procedure : Procedure(s):  BIOPSY, BONE MARROW          Past Medical History:   Diagnosis Date     CAD (coronary artery disease)     s/p stent to CFX     Heart attack (H)      Hyperlipidemia LDL goal <100      Hypertension      Stented coronary artery      Thyroid disease       Past Surgical History:   Procedure Laterality Date     BONE MARROW BIOPSY, BONE SPECIMEN, NEEDLE/TROCAR Left 2021    Procedure: BIOPSY, BONE MARROW with aspiration and ancillary studies;  Surgeon: Niharika Regalado MD;  Location: [facility]     BONE MARROW BIOPSY, BONE SPECIMEN, NEEDLE/TROCAR Left 10/14/2021    Procedure: BIOPSY, BONE MARROW;  Surgeon: Alexa Albert APRN [facility];  Location: [facility] OR     BONE MARROW BIOPSY, BONE SPECIMEN, NEEDLE/TROCAR Right 3/7/2022    Procedure: BIOPSY, BONE MARROW;  Surgeon: Deborah Carmona PA-C;  Location: [facility] OR     BONE MARROW BIOPSY, BONE SPECIMEN, NEEDLE/TROCAR N/A 2022    Procedure: BIOPSY, BONE MARROW;  Surgeon: Deborah Leblanc PA-C;  Location: [facility] OR     HEART CATH PTCA SINGLE VESSEL  10/10/2004    Stent to CFX - [facility]      INSERT CATHETER VASCULAR ACCESS Right 11/3/2021    Procedure: NON VALVED TUNNELED DUAL LUMEN APHARESIS CAPABLE LINE INSERTION, VASCULAR ACCESS CATHETER;  Surgeon: Werner Mcnamara MD;  Location: [facility] OR     IR CVC TUNNEL PLACEMENT > 5 YRS OF AGE  11/3/2021     IR CVC TUNNEL REMOVAL RIGHT  2021     THYROIDECTOMY N/A 5/3/2022    Procedure: total thyroidectomy, Left selective neck dissection level 6 and level 7 ;  Surgeon: Clarita Sepulveda MD;  Location: [facility] OR      Allergies   Allergen Reactions     Blood Transfusion Related (Informational Only) Other (See Comments)     Patient has a history of a clinically significant antibody against RBC antigens.  A delay in compatible RBCs may occur.       Social History     Tobacco Use      Smoking status: Never     Smokeless tobacco: Never   Substance Use Topics     Alcohol use: Not Currently      Wt Readings from Last 1 Encounters:   12/05/22 67.6 kg (149 lb)        Anesthesia Evaluation   Pt has had prior anesthetic. Type: MAC.    No history of anesthetic complications       ROS/MED HX  ENT/Pulmonary:    (-) tobacco use, asthma and sleep apnea   Neurologic:    (-) no seizures and no CVA   Cardiovascular:     (+) hypertension--CAD --stent (prior stent to CFX)-Drug Eluting Stent.  (-) taking anticoagulants/antiplatelets and arrhythmias   METS/Exercise Tolerance: >4 METS    Hematologic:    (-) anemia   Musculoskeletal:   (+) fracture, Fractures: Previous rib fractures prompting eval for MM, but no current fractures.     GI/Hepatic:    (-) GERD and liver disease   Renal/Genitourinary:    (-) renal disease   Endo:     (+) thyroid problem, hypothyroidism,     Psychiatric/Substance Use:    (-) psychiatric history   Infectious Disease:       Malignancy:   (+) Malignancy (Multiple myeloma, normal calcium, prior fractures prompted eval), History of Lymphoma/Leukemia.Lymph CA Active status post.        Other:      (+) , no H/O Chronic Pain,        Physical Exam    Airway  airway exam normal           Respiratory Devices and Support         Dental  no notable dental history         Cardiovascular   cardiovascular exam normal          Pulmonary   pulmonary exam normal                OUTSIDE LABS:  CBC:   Lab Results   Component Value Date    WBC 4.6 12/05/2022    WBC 5.0 11/10/2022    HGB 11.3 (L) 12/05/2022    HGB 11.6 (L) 11/10/2022    HCT 34.4 (L) 12/05/2022    HCT 35.3 (L) 11/10/2022     12/05/2022     11/10/2022     BMP:   Lab Results   Component Value Date     12/05/2022     11/10/2022    POTASSIUM 3.9 12/05/2022    POTASSIUM 3.6 11/10/2022    CHLORIDE 104 12/05/2022    CHLORIDE 104 11/10/2022    CO2 22 12/05/2022    CO2 21 (L) 11/10/2022    BUN 10.8 12/05/2022    BUN 16.4  11/10/2022    CR 1.33 (H) 12/05/2022    CR 1.30 (H) 11/10/2022    GLC 99 12/05/2022     (H) 11/10/2022     COAGS:   Lab Results   Component Value Date    PTT 41 (H) 11/22/2021    INR 0.98 12/05/2022    FIBR 485 11/21/2021     POC: No results found for: BGM, HCG, HCGS  HEPATIC:   Lab Results   Component Value Date    ALBUMIN 4.3 12/05/2022    PROTTOTAL 6.2 (L) 12/05/2022    ALT 15 12/05/2022    AST 24 12/05/2022    ALKPHOS 60 12/05/2022    BILITOTAL 0.4 12/05/2022     OTHER:   Lab Results   Component Value Date    LACT 1.9 11/22/2021    A1C 5.5 06/17/2022    HARDEEP 8.5 (L) 12/05/2022    PHOS 2.6 11/10/2022    MAG 1.9 11/10/2022    TSH 0.07 (L) 06/17/2022    T4 1.36 06/17/2022    CRP <2.9 04/30/2021       Anesthesia Plan    ASA Status:  3   NPO Status:  NPO Appropriate    Anesthesia Type: MAC.     - Reason for MAC: straight local not clinically adequate   Induction: Intravenous.   Maintenance: TIVA.        Consents    Anesthesia Plan(s) and associated risks, benefits, and realistic alternatives discussed. Questions answered and patient/representative(s) expressed understanding.    - Discussed:     - Discussed with:  Patient         Postoperative Care    Pain management: Multi-modal analgesia.   PONV prophylaxis: Ondansetron (or other 5HT-3)     Comments:                Willie Lofton MD

## 2022-12-05 NOTE — ANESTHESIA POSTPROCEDURE EVALUATION
Patient: June Ronquillo    Procedure: Procedure(s):  BIOPSY, BONE MARROW       Anesthesia Type:  MAC    Note:  Disposition: Outpatient   Postop Pain Control: Uneventful            Sign Out: Well controlled pain   PONV: No   Neuro/Psych: Uneventful            Sign Out: Acceptable/Baseline neuro status   Airway/Respiratory: Uneventful            Sign Out: Acceptable/Baseline resp. status   CV/Hemodynamics: Uneventful            Sign Out: Acceptable CV status; No obvious hypovolemia; No obvious fluid overload   Other NRE: NONE   DID A NON-ROUTINE EVENT OCCUR? No           Last vitals:  Vitals Value Taken Time   /72 12/05/22 1445   Temp 36.7  C (98.1  F) 12/05/22 1445   Pulse 72 12/05/22 1445   Resp 16 12/05/22 1445   SpO2 100 % 12/05/22 1445       Electronically Signed By: Willie Lofton MD  December 5, 2022  4:37 PM  
Statement Selected

## 2022-12-05 NOTE — NURSING NOTE
Chief Complaint   Patient presents with     Blood Draw     Labs drawn from PIV by RN in lab. Line flushed with saline. Vitals taken. Pt checked in for appointment(s).      Karla MEANS RN PHN BSN  BMT/Oncology Lab

## 2022-12-05 NOTE — PROGRESS NOTES
Patient Name: June Ronquillo  Patient MRN: [id number]  Patient : 1970    Abbreviated H&P and Pre-sedation Assessment for bone marrow biopsy with sedation    Chief complaint and/or reason for Procedure: 1 year post auto BMT for MM    Planned level of sedation: Minimal - moderate sedation    History of problems with sedation: (patient or family hx): No    ASA Assessment Category: 2 - Mild systemic disease    History of sleep apnea: No    History of blood thinners: No     Appropriate NPO status: Yes; last food at 8pm last night; had some water at 1145a today (needs to leave urine sample)    Current tobacco use: No    Any recent fever, cough, chest or sinus congestion, SOB, weight loss, chest pain, diarrhea or constipation. Yes; URI sx 1 month ago; lingering mild cough, not productive- only clear sputum occasionally. Much better post Z-pack mid-Nov.    Medications   Current Outpatient Medications   Medication     acyclovir (ZOVIRAX) 800 MG tablet     aspirin (ASA) 81 MG EC tablet     calcium carb-cholecalciferol (OS-HARDEEP) 500-200 MG-UNIT tablet     calcium carbonate (TUMS) 500 MG chewable tablet     fluticasone (FLONASE) 50 MCG/ACT nasal spray     LENalidomide (REVLIMID) 10 MG CAPS capsule     levothyroxine (SYNTHROID/LEVOTHROID) 125 MCG tablet     losartan (COZAAR) 50 MG tablet     meclizine (ANTIVERT) 25 MG tablet     potassium chloride ER (KLOR-CON M) 10 MEQ CR tablet     rosuvastatin (CRESTOR) 40 MG tablet     sulfamethoxazole-trimethoprim (BACTRIM DS) 800-160 MG tablet     vitamin B complex with vitamin C (STRESS TAB) tablet     No current facility-administered medications for this visit.       Allergies  Blood transfusion related (informational only)    PMH:  Past Medical History:   Diagnosis Date     CAD (coronary artery disease)     s/p stent to CFX     Heart attack (H)      Hyperlipidemia LDL goal <100      Hypertension      Stented coronary artery      Thyroid disease        Past Surgical History:    Procedure Laterality Date     BONE MARROW BIOPSY, BONE SPECIMEN, NEEDLE/TROCAR Left 5/17/2021    Procedure: BIOPSY, BONE MARROW with aspiration and ancillary studies;  Surgeon: Niharika Regalado MD;  Location: [facility] OR     BONE MARROW BIOPSY, BONE SPECIMEN, NEEDLE/TROCAR Left 10/14/2021    Procedure: BIOPSY, BONE MARROW;  Surgeon: Alexa Albert APRN CNP;  Location: [facility] OR     BONE MARROW BIOPSY, BONE SPECIMEN, NEEDLE/TROCAR Right 3/7/2022    Procedure: BIOPSY, BONE MARROW;  Surgeon: Deborah Carmona PA-C;  Location: [facility] OR     BONE MARROW BIOPSY, BONE SPECIMEN, NEEDLE/TROCAR N/A 5/9/2022    Procedure: BIOPSY, BONE MARROW;  Surgeon: Deborah Leblanc PA-C;  Location: [facility] OR     HEART CATH PTCA SINGLE VESSEL  10/10/2004    Stent to [facility]      INSERT CATHETER VASCULAR ACCESS Right 11/3/2021    Procedure: NON VALVED TUNNELED DUAL LUMEN APHARESIS CAPABLE LINE INSERTION, VASCULAR ACCESS CATHETER;  Surgeon: Werner Mcnamara MD;  Location: [facility] OR     IR CVC TUNNEL PLACEMENT > 5 YRS OF AGE  11/3/2021     IR CVC TUNNEL REMOVAL RIGHT  12/2/2021     THYROIDECTOMY N/A 5/3/2022    Procedure: total thyroidectomy, Left selective neck dissection level 6 and level 7 ;  Surgeon: Clarita Sepulveda MD;  Location: [facility] OR       Focused Physical exam pertinent to procedure:          (Details of heart, lung, ASA assessment and mallampati assessment in pre procedure assessment flowsheet)  General- NAD   Recent vital signs-  Blood pressure 128/84, pulse 71, temperature 97.8  F (36.6  C), temperature source Oral, resp. rate 16, weight 67.9 kg (149 lb 11.2 oz), SpO2 98 %.    HEART-regular rate and rhythm and no murmurs, gallops, or rub  LUNGS-bases a little course initially, cleared after coughing; then CTAB, no wheeze  OROPHARYNGEAL - MALLAMPATTI- Class II (visualization of the soft palate, fauces, and uvula)    A/P:Reviewed history, medications, allergies, clinical information and pre procedure  assessment. The patient was informed of the risks and benefits of the procedure.  They would like to proceed.  June Ronquillo is approved for use of sedation during their procedure as noted above pending anesthesia eval.    Follow-up with DOM 12/8/22    Sarahi Nj PA-C

## 2022-12-05 NOTE — PROCEDURES
"BMT ONC Adult Bone Marrow Biopsy Procedure Note  December 5, 2022  Ht 1.651 m (5' 5\")   Wt 67.6 kg (149 lb)   BMI 24.79 kg/m       Learning needs assessment complete within 12 months? YES    DIAGNOSIS: Multiple Myeloma     PROCEDURE: Unilateral Bone Marrow Biopsy and Unilateral Aspirate    [place] [facility] [place] 5th [place]cedure Room    Patient s identification was positively verified by verbal identification and invasive procedure safety checklist was completed. Informed consent was obtained. Sedation was provided by anesthesiology. The patient was placed in the prone position and prepped and draped in a sterile manner. Approximately 10 cc of 1% Lidocaine was used over the left posterior iliac spine. Following this a 3 mm incision was made. Trephine bone marrow core(s) was (were) obtained from the [facility]. Bone marrow aspirates were obtained from the [facility]. Aspirates were sent for morphology, immunophenotyping, cytogenetics, molecular diagnostics  and research studies. A total of approximately 25 ml of marrow was aspirated. Following this procedure a sterile dressing was applied to the bone marrow biopsy site(s). The patient was placed in the supine position to maintain pressure on the biopsy site. Post-procedure wound care instructions were given.     Complications: NO    Pain assessment per nursing    Interventions: NO    Length of procedure:20 minutes or less      Procedure performed by: Alba Moses CNP    "

## 2022-12-05 NOTE — LETTER
2022         RE: June Ronquillo  [address]  [place] [address]        Dear Colleague,    Thank you for referring your patient, June Ronquillo, to the [facility]LOOD [facility] MARROW [facility]ANSPLANT [facility] [place]. Please see a copy of my visit note below.    Patient Name: June Ronquillo  Patient MRN: [id number]  Patient : 1970    Abbreviated H&P and Pre-sedation Assessment for bone marrow biopsy with sedation    Chief complaint and/or reason for Procedure: 1 year post auto BMT for MM    Planned level of sedation: Minimal - moderate sedation    History of problems with sedation: (patient or family hx): No    ASA Assessment Category: 2 - Mild systemic disease    History of sleep apnea: No    History of blood thinners: No     Appropriate NPO status: Yes; last food at 8pm last night; had some water at 1145a today (needs to leave urine sample)    Current tobacco use: No    Any recent fever, cough, chest or sinus congestion, SOB, weight loss, chest pain, diarrhea or constipation. Yes; URI sx 1 month ago; lingering mild cough, not productive- only clear sputum occasionally. Much better post Z-pack mid-Nov.    Medications   Current Outpatient Medications   Medication     acyclovir (ZOVIRAX) 800 MG tablet     aspirin (ASA) 81 MG EC tablet     calcium carb-cholecalciferol (OS-HARDEEP) 500-200 MG-UNIT tablet     calcium carbonate (TUMS) 500 MG chewable tablet     fluticasone (FLONASE) 50 MCG/ACT nasal spray     LENalidomide (REVLIMID) 10 MG CAPS capsule     levothyroxine (SYNTHROID/LEVOTHROID) 125 MCG tablet     losartan (COZAAR) 50 MG tablet     meclizine (ANTIVERT) 25 MG tablet     potassium chloride ER (KLOR-CON M) 10 MEQ CR tablet     rosuvastatin (CRESTOR) 40 MG tablet     sulfamethoxazole-trimethoprim (BACTRIM DS) 800-160 MG tablet     vitamin B complex with vitamin C (STRESS TAB) tablet     No current facility-administered medications for this visit.       Allergies  Blood  transfusion related (informational only)    PMH:  Past Medical History:   Diagnosis Date     CAD (coronary artery disease)     s/p stent to CFX     Heart attack (H)      Hyperlipidemia LDL goal <100      Hypertension      Stented coronary artery      Thyroid disease        Past Surgical History:   Procedure Laterality Date     BONE MARROW BIOPSY, BONE SPECIMEN, NEEDLE/TROCAR Left 5/17/2021    Procedure: BIOPSY, BONE MARROW with aspiration and ancillary studies;  Surgeon: Niharika Regalado MD;  Location: [facility] OR     BONE MARROW BIOPSY, BONE SPECIMEN, NEEDLE/TROCAR Left 10/14/2021    Procedure: BIOPSY, BONE MARROW;  Surgeon: Alexa Albert APRN [facility];  Location: [facility] OR     BONE MARROW BIOPSY, BONE SPECIMEN, NEEDLE/TROCAR Right 3/7/2022    Procedure: BIOPSY, BONE MARROW;  Surgeon: Deborah Carmona PA-C;  Location: [facility] OR     BONE MARROW BIOPSY, BONE SPECIMEN, NEEDLE/TROCAR N/A 5/9/2022    Procedure: BIOPSY, BONE MARROW;  Surgeon: Deborah Leblanc PA-C;  Location: [facility] OR     HEART CATH PTCA SINGLE VESSEL  10/10/2004    Stent to CFX - [facility]      INSERT CATHETER VASCULAR ACCESS Right 11/3/2021    Procedure: NON VALVED TUNNELED DUAL LUMEN APHARESIS CAPABLE LINE INSERTION, VASCULAR ACCESS CATHETER;  Surgeon: Werner Mcnamara MD;  Location: [facility] OR     IR CVC TUNNEL PLACEMENT > 5 YRS OF AGE  11/3/2021     IR CVC TUNNEL REMOVAL RIGHT  12/2/2021     THYROIDECTOMY N/A 5/3/2022    Procedure: total thyroidectomy, Left selective neck dissection level 6 and level 7 ;  Surgeon: Clarita Sepulveda MD;  Location: [facility] OR       Focused Physical exam pertinent to procedure:          (Details of heart, lung, ASA assessment and mallampati assessment in pre procedure assessment flowsheet)  General- NAD   Recent vital signs-  Blood pressure 128/84, pulse 71, temperature 97.8  F (36.6  C), temperature source Oral, resp. rate 16, weight 67.9 kg (149 lb 11.2 oz), SpO2 98 %.    HEART-regular rate and rhythm and no  murmurs, gallops, or rub  LUNGS-bases a little course initially, cleared after coughing; then CTAB, no wheeze  OROPHARYNGEAL - MALLAMPATTI- Class II (visualization of the soft palate, fauces, and uvula)    A/P:Reviewed history, medications, allergies, clinical information and pre procedure assessment. The patient was informed of the risks and benefits of the procedure.  They would like to proceed.  June Ronquillo is approved for use of sedation during their procedure as noted above pending anesthesia eval.    Follow-up with DOM 12/8/22    Sarahi Nj PA-C

## 2022-12-05 NOTE — ANESTHESIA CARE TRANSFER NOTE
Patient: June Ronquillo    Procedure: Procedure(s):  BIOPSY, BONE MARROW       Diagnosis: Multiple myeloma not having achieved remission (H) [C90.00]  Diagnosis Additional Information: No value filed.    Anesthesia Type:   No value filed.     Note:    Oropharynx: spontaneously breathing  Level of Consciousness: awake  Oxygen Supplementation: room air    Independent Airway: airway patency satisfactory and stable  Dentition: dentition unchanged  Vital Signs Stable: post-procedure vital signs reviewed and stable  Report to RN Given: handoff report given  Patient transferred to: Phase II    Handoff Report: Identifed the Patient, Identified the Reponsible Provider, Reviewed the pertinent medical history, Discussed the surgical course, Reviewed Intra-OP anesthesia mangement and issues during anesthesia, Set expectations for post-procedure period and Allowed opportunity for questions and acknowledgement of understanding      Vitals:  Vitals Value Taken Time   BP     Temp     Pulse     Resp     SpO2         Electronically Signed By: PATTY Lindsay CRNA  December 5, 2022  2:20 PM

## 2022-12-06 LAB
ALBUMIN SERPL ELPH-MCNC: 4 G/DL (ref 3.7–5.1)
ALPHA1 GLOB SERPL ELPH-MCNC: 0.3 G/DL (ref 0.2–0.4)
ALPHA2 GLOB SERPL ELPH-MCNC: 0.7 G/DL (ref 0.5–0.9)
B-GLOBULIN SERPL ELPH-MCNC: 0.7 G/DL (ref 0.6–1)
GAMMA GLOB SERPL ELPH-MCNC: 0.3 G/DL (ref 0.7–1.6)
IGA SERPL-MCNC: 69 MG/DL (ref 84–499)
IGG SERPL-MCNC: 373 MG/DL (ref 610–1616)
IGM SERPL-MCNC: 27 MG/DL (ref 35–242)
KAPPA LC FREE SER-MCNC: 0.93 MG/DL (ref 0.33–1.94)
KAPPA LC FREE/LAMBDA FREE SER NEPH: 1.12 {RATIO} (ref 0.26–1.65)
LAMBDA LC FREE SERPL-MCNC: 0.83 MG/DL (ref 0.57–2.63)
M PROTEIN SERPL ELPH-MCNC: 0.1 G/DL
PROT PATTERN SERPL ELPH-IMP: ABNORMAL

## 2022-12-06 NOTE — NURSING NOTE
Urine collected in clinic today per provider order. Patient tolerated without incident and left sample in the exam room. LPN collected urine and sent down to lab on first floor.         Jillian Gomez LPN December 6, 2022 11:24 AM

## 2022-12-07 LAB
PROT PATTERN SERPL IFE-IMP: NORMAL
TESTOST SERPL-MCNC: 342 NG/DL (ref 240–950)

## 2022-12-08 ENCOUNTER — OFFICE VISIT (OUTPATIENT)
Dept: TRANSPLANT | Facility: CLINIC | Age: 52
End: 2022-12-08
Attending: INTERNAL MEDICINE
Payer: COMMERCIAL

## 2022-12-08 VITALS
OXYGEN SATURATION: 100 % | SYSTOLIC BLOOD PRESSURE: 127 MMHG | DIASTOLIC BLOOD PRESSURE: 88 MMHG | HEART RATE: 86 BPM | TEMPERATURE: 98 F | WEIGHT: 150.7 LBS | BODY MASS INDEX: 25.08 KG/M2

## 2022-12-08 DIAGNOSIS — C90.01 MULTIPLE MYELOMA IN REMISSION (H): Primary | ICD-10-CM

## 2022-12-08 DIAGNOSIS — C90.00 MULTIPLE MYELOMA NOT HAVING ACHIEVED REMISSION (H): Primary | ICD-10-CM

## 2022-12-08 PROCEDURE — G0463 HOSPITAL OUTPT CLINIC VISIT: HCPCS

## 2022-12-08 PROCEDURE — 99215 OFFICE O/P EST HI 40 MIN: CPT

## 2022-12-08 RX ORDER — LENALIDOMIDE 10 MG/1
10 CAPSULE ORAL DAILY
Qty: 28 CAPSULE | Refills: 0 | Status: SHIPPED | OUTPATIENT
Start: 2022-12-08 | End: 2023-01-09

## 2022-12-08 ASSESSMENT — PAIN SCALES - GENERAL: PAINLEVEL: NO PAIN (0)

## 2022-12-08 NOTE — LETTER
12/8/2022         RE: June Ronquillo  [address]  [facility] [address]        Dear Colleague,    Thank you for referring your patient, June Ronquillo, to the [facility]. Please see a copy of my visit note below.    BMT Progress Note     ID: June Ronquillo is a 50 yo man s/p auto PBSCT for high risk MM, here for day +183 review.      HPI:   He is feeling overall well, energy is good, almost back to normal, back to work. Recovering from an URI that has lasted 4 weeks.  Afebrile with mild congestion.  Working, will present tomorrow.  Good QOL.    ROS: 12 point Negative except as stated above in HPI       Diagnosis and Treatment Summary      Disease presentation and baseline characteristics: nontraumatic rib fracture     Date Treatment Name Response Side Effects / Toxicities    May to October 2021 11/11/21 RVD with daratumumab      Autologous hematopoietic cell transplantation, melphalans 200mg/m2, CD34 cell dose  6.99x10^6 VGPR        CR, biochemically    Day 100 PET/CT CITLALLI, BMB CITLALLI, and biochemical markers CR Peripheral neuropathy        neutropenic fevers, abdominal pain, nausea and diarrhea          Physical Exam  Constitutional:       General: He is not in acute distress.     Appearance: Normal appearance. He is normal weight.   Pulmonary:      Effort: Pulmonary effort is normal.   Neurological:      Mental Status: He is alert.   Psychiatric:         Mood and Affect: Mood normal.         Thought Content: Thought content normal.         Judgment: Judgment normal.            Labs:  Lab Results   Component Value Date    WBC 4.6 12/05/2022    ANEU 3.0 07/12/2022    HGB 11.3 (L) 12/05/2022    HCT 34.4 (L) 12/05/2022     12/05/2022     12/05/2022    POTASSIUM 3.9 12/05/2022    CHLORIDE 104 12/05/2022    CO2 22 12/05/2022    GLC 99 12/05/2022    BUN 10.8 12/05/2022    CR 1.33 (H) 12/05/2022    MAG 1.9 11/10/2022    INR  0.98 12/05/2022    BILITOTAL 0.4 12/05/2022    AST 24 12/05/2022    ALT 15 12/05/2022    ALKPHOS 60 12/05/2022    PROTTOTAL 6.2 (L) 12/05/2022    ALBUMIN 4.3 12/05/2022       I have assessed all abnormal lab values for their clinical significance and any values considered clinically significant have been addressed in the assessment and plan.          Urine Studies       Recent Labs   Lab Test 11/20/21 0900   COLOR Straw   APPEARANCE Clear   URINEGLC Negative   URINEBILI Negative   URINEKETONE Negative   SG 1.008   UBLD Negative   URINEPH 7.0   PROTEIN Negative   UUROI Normal   NITRITE Negative   LEUKEST Negative   RBCU 1   WBCU 1       Creatinine Clearance    Recent Labs   Lab Test 12/05/22  1329 11/10/22  1310 05/09/22  0900 10/16/21  0525   HT  --   --   --  166   WT  --   --   --  73.0   RAW  --   --   --  95   STD  --   --   --  90   VOL  --   --  2,290 1,740  1,850   DUR  --   --  24.0 24.0  24.0   CREATININE  --   --   --  1.01   UCRR 126.0   < > 63 77  75   UCR24  --   --  1.44 1.34  1.39    < > = values in this interval not displayed.         Immunoglobulins     Recent Labs   Lab Test 12/05/22  1144 11/10/22  1310 10/11/22  1423 09/13/22  1306 08/16/22  1306 07/12/22  1003   * 364* 316* 290* 332* 261*       Recent Labs   Lab Test 12/05/22  1144 11/10/22  1310 10/11/22  1423 09/13/22  1306 08/16/22  1306 07/12/22  1003   IGA 69* 83* 64* 56* 56* 30*       Recent Labs   Lab Test 12/05/22  1144 11/10/22  1310 10/11/22  1423 09/13/22  1306 08/16/22  1306 07/12/22  1003   IGM 27* 44 23* 16* 21* <10*         Monocloncal Protein Studies     M spike    Recent Labs   Lab Test 12/05/22  1144 11/10/22  1310 10/11/22  1423 09/13/22  1306 08/16/22  1306 07/12/22  1003   ELPM 0.1* 0.1* 0.1* 0.1* 0.1* 0.1*       Kappa FLC    Recent Labs   Lab Test 12/05/22  1144 11/10/22  1310 10/11/22  1423 09/13/22  1306 08/16/22  1306 07/12/22  1003   KFLCA 0.93 0.98 0.90 0.87 0.78 0.81       Lambda FLC    Recent Labs   Lab  Test 12/05/22  1144 11/10/22  1310 10/11/22  1423 09/13/22  1306 08/16/22  1306 07/12/22  1003   LFLCA 0.83 0.84 0.66 0.47* 0.42* 0.63       FLC Ratio    Recent Labs   Lab Test 12/05/22  1144 11/10/22  1310 10/11/22  1423 09/13/22  1306 08/16/22  1306 07/12/22  1003   KLRA 1.12 1.17 1.36 1.85* 1.86* 1.29           Bone Marrow Biopsy     Lab Results   Component Value Date    FINALDX  12/05/2022     Bone marrow, posterior iliac crest, left decalcified trephine biopsy and touch imprint; left aspirate clot, direct aspirate smear, and concentrated aspirate smear; and peripheral smear:    - Normocellular marrow (cellularity estimated at 40%) with trilineage hematopoiesis, less than 1% blasts   - No morphologic evidence for plasma cell myeloma, less than 1% overall polytypic plasma cells (see comment)   - Peripheral blood showing slight normochromic, normocytic anemia      COMDX  12/05/2022     There is no immunophenotypic evidence of a clonal plasma cell process.           Lab Results   Component Value Date    FLINTERP  12/05/2022     A. Iliac Crest, Bone Marrow Aspirate, Left:  -Polytypic plasma cells  See comment        COMDX  12/05/2022     There is no immunophenotypic evidence of a clonal plasma cell process.             PET Scan       Results for orders placed during the hospital encounter of 12/05/22    PET ONCOLOGY WHOLE BODY    Status: Normal 12/5/2022    Narrative  Combined Report of: PET and CT on 12/5/2022 9:38 AM:    1. PET of the neck, chest, abdomen, and pelvis.  2. PET CT Fusion for Attenuation Correction and Anatomical  Localization.  3. Diagnostic CT of the chest, abdomen and pelvis with intravenous  contrast obtained for diagnostic interpretation.  4. 3D MIP and PET-CT fused images were processed on an independent  workstation and archived to PACS and reviewed by a radiologist.    Technique:    1. PET: The patient received 9.96 mCi of F-18-FDG. The serum glucose  was 110 prior to administration. Body  weight was 70.6 kg. Images were  evaluated in the axial, sagittal, and coronal planes as well as the  rotational whole body MIP. Images were acquired from the cranial  vertex to the feet.    UPTAKE WAS MEASURED AT 60 MINUTES.    BACKGROUND: Liver SUV max = 3.1, Aorta Blood SUV Max = 2.4.    2. CT: Volumetric acquisition for clinical interpretation of the  chest, abdomen, and pelvis acquired at 3 mm sections. The chest,  abdomen, and pelvis were evaluated at 5 mm sections in bone, soft  tissue, and lung windows.    Contrast and Medications:  IV contrast: 86 mL of Isovue 370 intravenously.  PO contrast: 500 mL oral Breeza contrast.  Additional Medications: None.    3. 3D MIP and PET-CT fused images were processed on an independent  workstation and archived to PACS and reviewed by a radiologist.    INDICATION: Multiple myeloma not having achieved remission (H).    ADDITIONAL INFORMATION OBTAINED FROM EMR: 52-year-old male with a  history of multiple myeloma, status post autologous BMT. Patient also  with papillary thyroid carcinoma, status post total thyroidectomy on  5/3/2022.    COMPARISON: PET/CT 2/19/2022, 8/31/2021      FINDINGS:    HEAD/NECK:  Please see separate dictation for the high resolution PET-CT of the  head and neck performed at the same time for discussion of findings.    CHEST:  There are two hypermetabolic mediastinal lymph nodes that are new  compared to PET/CT from 2/19/2022:  - 1.7 x 0.8 cm precarinal lymph node (series 4, image 117) with SUV  max 6.0.  - 1.2 x 0.8 cm subcarinal lymph node (series 4, image 123) with SUV  max 6.2.    Low level FDG uptake of the distal esophagus with a small sliding  hiatal hernia, likely esophagitis.    The central tracheobronchial tree is patent. No pleural effusion,  pneumothorax, or focal consolidation. Mild right greater than left  basilar atelectasis. There is a new 4 mm groundglass opacity in the  right upper lobe (series 8, image 31).    The heart is not  enlarged. No significant pericardial effusion. The  ascending aorta and main pulmonary artery are normal in caliber.  Coronary artery calcification.      ABDOMEN AND PELVIS:  There is no suspicious FDG uptake in the abdomen or pelvis.    The liver, gallbladder, pancreas, spleen, adrenal glands, and kidneys  appear unremarkable. The urinary bladder and prostate gland are  unremarkable. No pelvic mass. Colonic diverticulosis. No bowel wall  thickening or evidence for obstruction. The appendix is unremarkable.    No abdominal aortic aneurysm. The major vasculature of the abdomen is  patent. No suspicious lymphadenopathy in the abdomen/pelvis. No free  fluid or pneumoperitoneum.      LOWER EXTREMITIES:  There is no suspicious FDG uptake in the visualized lower extremities.    BONES:  There is no suspicious FDG uptake in the skeleton. Redemonstration of  lytic lesions throughout the axial skeleton, for example in the right  iliac bone, T12 vertebral body, sternum, and medial clavicles. Chronic  appearing fracture deformity of the left ninth rib.    Impression  IMPRESSION: In this patient with a history of multiple myeloma, status  post bone marrow transplant:  1. No hypermetabolic bony lesions. Redemonstration of lytic lesions  throughout the axial skeleton.  2. Two mediastinal lymph nodes are not enlarged since prior imaging  but newly FDG avid. They still fet to be reactive. Interval follow up  is recommended.  3. New 4 mm groundglass opacity in the right upper lobe. This may be  infectious/inflammatory, however recommend attention on follow-up.  4. Please see separate dictation for the high resolution PET-CT of the  head and neck performed at the same time for discussion of findings.    I have personally reviewed the examination and initial interpretation  and I agree with the findings.    BRICE PERDOMO MD      SYSTEM ID:  [id number]         A/P: June Ronquillo is a 52 yo man D+103 s/p auto PBSCT for high risk  MM      1. BMT  - Cell dose 6.99 x 10^6; high risk disease  - GCSF completed 11/23.   - 12/7/2021: Light chains low, SPEP/IFN no monoclonal proteins.  - 1/19/2022 Day 60 biochemical assessment: FLC K 0.14, FLCL 0.3, K/L 0.47, IgG 204, IgG 7, IgM <10. SPEP/IFN: Marked hypogammaglobulinemia. No monoclonal protein seen on electrophoresis, immunofixation.  - Follows with Dr. Vazquez for maintenance (on lenalidomide + vidya off study due to high risk disease and neuropathy, other consideration are for PI  Or single agent IMids), Zometa and other routine care.  - 2/19/2022 PET/CT: No FDG avid bone lesions. Numerous lytic lesions predominantly in the axial skeleton are stable. FDG avid left thyroid nodule, most likely representing a papillary thyroid carcinoma. Consider further evaluation with thyroid  Ultrasound. 3/2022 BMB No morphologic or immunophenotypic evidence for plasma cell myeloma, less than 1% overall polytypic plasma cells. CG WNL No clonal chromosomal abnormality was detected among the 20 metaphase cells analyzed.  No evidence was found by G-banding of the complex abnormal clone identified at diagnosis of this patient's myeloma. FLC WNL. SPEP/IFN Faint monoclonal IgG immunoglobulin of kappa light chain type on IFN.  -  ASSESSMENT:  --- 5/9/2022 BMB: No morphologic or immunophenotypic evidence of plasma cell neoplasm. Normocellular marrow for age (variable, overall 40%) with trilineage hematopoiesis, no overt dysplasia, no increase in blasts, and less than 1% polytypic plasma cells  -- SPEP PENDING (0.1 IN APRIL), IFN Monoclonal IgG immunoglobulin of kappa light chain type. UPEP/IFN No obvious monoclonal protein seen and the absence of a monoclonal immunoglobulins was confirmed by immunofixation of this same urine sample. TP/cR WNL. IgG/A/M low, CD4 WNL, FLC NA added to labs.  - VGPR with m-spike 0.1 and negative studies otherwise.       2. HEME/COAG:  Mild anemia, transfusion independent      3. ID.     Bactrim started 12/13/2021, on ACV and continue both for one year  Fluconazole until day +60, completed  Received euvsheld, influenza vaccine and covid boosters after day 100, will get locally not startig today due to commitments and concern with SE. Per updateds from Dr. Nair he has recevied the vaccines and is agreeable to participating in the vaccine study, recommended expert opinion to wait 4 months from evusheld to start boosters.    4. GI/NUTRITION: Improving, no concerns      5. CV: aspirin, statin, over-the-counter fish oil, losartan. Return to his cardiologist for follow-up.  - Hx of CAD, MI at age 28 with stent placed  - HTN: 11/26 resumed losartan 50mg daily. Cont to hold hydrochlorothiazide      6. RENAL/FEN:  Cr and lytes stable    7. ENDOCRINE: 5/2022 s/p thyroidectomy, following with ENT and endocrine  - hx hypothyroidism, cont synthroid  - new diagnosis of papillary thyroid carcinoma per bx result from 11/2  - Zometa with oncology, ca/vitD  - Impotence work up by endocrine starting with hormonal evaluation and referral to urology as needed, he will contact them     8. Neuro: PN better on B complex, off gabapentin    9. Dermatology referral placed today    Summary: doing well, in VGPR, tolerating maintenance well.  Discussed pros and cons of maintenance and we agreed that he should continue as planned.      Plan  - vaccines to start on Dec 19 (ordered)  - continue follow up with Dr. Vazquez    40 minutes spent on the date of the encounter doing chart review, history and exam, documentation and further activities per the note      ITALIA GARNER MD

## 2022-12-08 NOTE — NURSING NOTE
"Oncology Rooming Note    December 8, 2022 12:55 PM   June Ronquillo is a 52 year old male who presents for:    Chief Complaint   Patient presents with     Oncology Clinic Visit     Multiple myeloma not having achieved remission     Initial Vitals: /88   Pulse 86   Temp 98  F (36.7  C) (Oral)   Wt 68.4 kg (150 lb 11.2 oz)   SpO2 100%   BMI 25.08 kg/m   Estimated body mass index is 25.08 kg/m  as calculated from the following:    Height as of 12/5/22: 1.651 m (5' 5\").    Weight as of this encounter: 68.4 kg (150 lb 11.2 oz). Body surface area is 1.77 meters squared.  No Pain (0) Comment: Data Unavailable   No LMP for male patient.  Allergies reviewed: Yes  Medications reviewed: Yes    Medications: Medication refills not needed today.  Pharmacy name entered into EPIC:    [facility] [place], [place] - [address]  [facility] - [place], [place] - [address] [place] [address] [address]  [facility] [place], [place] - [phone]    Clinical concerns: none       Ana Cristina Cisneros"

## 2022-12-08 NOTE — PROGRESS NOTES
BMT Progress Note     ID: June Ronquillo is a 50 yo man s/p auto PBSCT for high risk MM, here for day +183 review.      HPI:   He is feeling overall well, energy is good, almost back to normal, back to work. Recovering from an URI that has lasted 4 weeks.  Afebrile with mild congestion.  Working, will present tomorrow.  Good QOL.    ROS: 12 point Negative except as stated above in HPI       Diagnosis and Treatment Summary      Disease presentation and baseline characteristics: nontraumatic rib fracture     Date Treatment Name Response Side Effects / Toxicities    May to October 2021 11/11/21 RVD with daratumumab      Autologous hematopoietic cell transplantation, melphalans 200mg/m2, CD34 cell dose  6.99x10^6 VGPR        CR, biochemically    Day 100 PET/CT CITLALLI, BMB CITLALLI, and biochemical markers CR Peripheral neuropathy        neutropenic fevers, abdominal pain, nausea and diarrhea          Physical Exam  Constitutional:       General: He is not in acute distress.     Appearance: Normal appearance. He is normal weight.   Pulmonary:      Effort: Pulmonary effort is normal.   Neurological:      Mental Status: He is alert.   Psychiatric:         Mood and Affect: Mood normal.         Thought Content: Thought content normal.         Judgment: Judgment normal.            Labs:  Lab Results   Component Value Date    WBC 4.6 12/05/2022    ANEU 3.0 07/12/2022    HGB 11.3 (L) 12/05/2022    HCT 34.4 (L) 12/05/2022     12/05/2022     12/05/2022    POTASSIUM 3.9 12/05/2022    CHLORIDE 104 12/05/2022    CO2 22 12/05/2022    GLC 99 12/05/2022    BUN 10.8 12/05/2022    CR 1.33 (H) 12/05/2022    MAG 1.9 11/10/2022    INR 0.98 12/05/2022    BILITOTAL 0.4 12/05/2022    AST 24 12/05/2022    ALT 15 12/05/2022    ALKPHOS 60 12/05/2022    PROTTOTAL 6.2 (L) 12/05/2022    ALBUMIN 4.3 12/05/2022       I have assessed all abnormal lab values for their clinical significance and any values considered clinically significant  have been addressed in the assessment and plan.          Urine Studies       Recent Labs   Lab Test 11/20/21  0900   COLOR Straw   APPEARANCE Clear   URINEGLC Negative   URINEBILI Negative   URINEKETONE Negative   SG 1.008   UBLD Negative   URINEPH 7.0   PROTEIN Negative   UUROI Normal   NITRITE Negative   LEUKEST Negative   RBCU 1   WBCU 1       Creatinine Clearance    Recent Labs   Lab Test 12/05/22  1329 11/10/22  1310 05/09/22  0900 10/16/21  0525   HT  --   --   --  166   WT  --   --   --  73.0   RAW  --   --   --  95   STD  --   --   --  90   VOL  --   --  2,290 1,740  1,850   DUR  --   --  24.0 24.0  24.0   CREATININE  --   --   --  1.01   UCRR 126.0   < > 63 77  75   UCR24  --   --  1.44 1.34  1.39    < > = values in this interval not displayed.         Immunoglobulins     Recent Labs   Lab Test 12/05/22  1144 11/10/22  1310 10/11/22  1423 09/13/22  1306 08/16/22  1306 07/12/22  1003   * 364* 316* 290* 332* 261*       Recent Labs   Lab Test 12/05/22  1144 11/10/22  1310 10/11/22  1423 09/13/22  1306 08/16/22  1306 07/12/22  1003   IGA 69* 83* 64* 56* 56* 30*       Recent Labs   Lab Test 12/05/22  1144 11/10/22  1310 10/11/22  1423 09/13/22  1306 08/16/22  1306 07/12/22  1003   IGM 27* 44 23* 16* 21* <10*         Monocloncal Protein Studies     M spike    Recent Labs   Lab Test 12/05/22  1144 11/10/22  1310 10/11/22  1423 09/13/22  1306 08/16/22  1306 07/12/22  1003   ELPM 0.1* 0.1* 0.1* 0.1* 0.1* 0.1*       Kappa FLC    Recent Labs   Lab Test 12/05/22  1144 11/10/22  1310 10/11/22  1423 09/13/22  1306 08/16/22  1306 07/12/22  1003   KFLCA 0.93 0.98 0.90 0.87 0.78 0.81       Lambda FLC    Recent Labs   Lab Test 12/05/22  1144 11/10/22  1310 10/11/22  1423 09/13/22  1306 08/16/22  1306 07/12/22  1003   LFLCA 0.83 0.84 0.66 0.47* 0.42* 0.63       FLC Ratio    Recent Labs   Lab Test 12/05/22  1144 11/10/22  1310 10/11/22  1423 09/13/22  1306 08/16/22  1306 07/12/22  1003   KLRA 1.12 1.17 1.36 1.85*  1.86* 1.29           Bone Marrow Biopsy     Lab Results   Component Value Date    FINALDX  12/05/2022     Bone marrow, posterior iliac crest, left decalcified trephine biopsy and touch imprint; left aspirate clot, direct aspirate smear, and concentrated aspirate smear; and peripheral smear:    - Normocellular marrow (cellularity estimated at 40%) with trilineage hematopoiesis, less than 1% blasts   - No morphologic evidence for plasma cell myeloma, less than 1% overall polytypic plasma cells (see comment)   - Peripheral blood showing slight normochromic, normocytic anemia      COMDX  12/05/2022     There is no immunophenotypic evidence of a clonal plasma cell process.           Lab Results   Component Value Date    FLINTERP  12/05/2022     A. Iliac Crest, Bone Marrow Aspirate, Left:  -Polytypic plasma cells  See comment        COMDX  12/05/2022     There is no immunophenotypic evidence of a clonal plasma cell process.             PET Scan       Results for orders placed during the hospital encounter of 12/05/22    PET ONCOLOGY WHOLE BODY    Status: Normal 12/5/2022    Narrative  Combined Report of: PET and CT on 12/5/2022 9:38 AM:    1. PET of the neck, chest, abdomen, and pelvis.  2. PET CT Fusion for Attenuation Correction and Anatomical  Localization.  3. Diagnostic CT of the chest, abdomen and pelvis with intravenous  contrast obtained for diagnostic interpretation.  4. 3D MIP and PET-CT fused images were processed on an independent  workstation and archived to PACS and reviewed by a radiologist.    Technique:    1. PET: The patient received 9.96 mCi of F-18-FDG. The serum glucose  was 110 prior to administration. Body weight was 70.6 kg. Images were  evaluated in the axial, sagittal, and coronal planes as well as the  rotational whole body MIP. Images were acquired from the cranial  vertex to the feet.    UPTAKE WAS MEASURED AT 60 MINUTES.    BACKGROUND: Liver SUV max = 3.1, Aorta Blood SUV Max = 2.4.    2. CT:  Volumetric acquisition for clinical interpretation of the  chest, abdomen, and pelvis acquired at 3 mm sections. The chest,  abdomen, and pelvis were evaluated at 5 mm sections in bone, soft  tissue, and lung windows.    Contrast and Medications:  IV contrast: 86 mL of Isovue 370 intravenously.  PO contrast: 500 mL oral Breeza contrast.  Additional Medications: None.    3. 3D MIP and PET-CT fused images were processed on an independent  workstation and archived to PACS and reviewed by a radiologist.    INDICATION: Multiple myeloma not having achieved remission (H).    ADDITIONAL INFORMATION OBTAINED FROM EMR: 52-year-old male with a  history of multiple myeloma, status post autologous BMT. Patient also  with papillary thyroid carcinoma, status post total thyroidectomy on  5/3/2022.    COMPARISON: PET/CT 2/19/2022, 8/31/2021      FINDINGS:    HEAD/NECK:  Please see separate dictation for the high resolution PET-CT of the  head and neck performed at the same time for discussion of findings.    CHEST:  There are two hypermetabolic mediastinal lymph nodes that are new  compared to PET/CT from 2/19/2022:  - 1.7 x 0.8 cm precarinal lymph node (series 4, image 117) with SUV  max 6.0.  - 1.2 x 0.8 cm subcarinal lymph node (series 4, image 123) with SUV  max 6.2.    Low level FDG uptake of the distal esophagus with a small sliding  hiatal hernia, likely esophagitis.    The central tracheobronchial tree is patent. No pleural effusion,  pneumothorax, or focal consolidation. Mild right greater than left  basilar atelectasis. There is a new 4 mm groundglass opacity in the  right upper lobe (series 8, image 31).    The heart is not enlarged. No significant pericardial effusion. The  ascending aorta and main pulmonary artery are normal in caliber.  Coronary artery calcification.      ABDOMEN AND PELVIS:  There is no suspicious FDG uptake in the abdomen or pelvis.    The liver, gallbladder, pancreas, spleen, adrenal glands, and  kidneys  appear unremarkable. The urinary bladder and prostate gland are  unremarkable. No pelvic mass. Colonic diverticulosis. No bowel wall  thickening or evidence for obstruction. The appendix is unremarkable.    No abdominal aortic aneurysm. The major vasculature of the abdomen is  patent. No suspicious lymphadenopathy in the abdomen/pelvis. No free  fluid or pneumoperitoneum.      LOWER EXTREMITIES:  There is no suspicious FDG uptake in the visualized lower extremities.    BONES:  There is no suspicious FDG uptake in the skeleton. Redemonstration of  lytic lesions throughout the axial skeleton, for example in the right  iliac bone, T12 vertebral body, sternum, and medial clavicles. Chronic  appearing fracture deformity of the left ninth rib.    Impression  IMPRESSION: In this patient with a history of multiple myeloma, status  post bone marrow transplant:  1. No hypermetabolic bony lesions. Redemonstration of lytic lesions  throughout the axial skeleton.  2. Two mediastinal lymph nodes are not enlarged since prior imaging  but newly FDG avid. They still fet to be reactive. Interval follow up  is recommended.  3. New 4 mm groundglass opacity in the right upper lobe. This may be  infectious/inflammatory, however recommend attention on follow-up.  4. Please see separate dictation for the high resolution PET-CT of the  head and neck performed at the same time for discussion of findings.    I have personally reviewed the examination and initial interpretation  and I agree with the findings.    BRICE PERDOMO MD      SYSTEM ID:  [id number]         A/P: June Ronquillo is a 52 yo man D+103 s/p auto PBSCT for high risk MM      1. BMT  - Cell dose 6.99 x 10^6; high risk disease  - GCSF completed 11/23.   - 12/7/2021: Light chains low, SPEP/IFN no monoclonal proteins.  - 1/19/2022 Day 60 biochemical assessment: FLC K 0.14, FLCL 0.3, K/L 0.47, IgG 204, IgG 7, IgM <10. SPEP/IFN: Marked hypogammaglobulinemia. No monoclonal  protein seen on electrophoresis, immunofixation.  - Follows with Dr. Vazquez for maintenance (on lenalidomide + vidya off study due to high risk disease and neuropathy, other consideration are for PI  Or single agent IMids), Zometa and other routine care.  - 2/19/2022 PET/CT: No FDG avid bone lesions. Numerous lytic lesions predominantly in the axial skeleton are stable. FDG avid left thyroid nodule, most likely representing a papillary thyroid carcinoma. Consider further evaluation with thyroid  Ultrasound. 3/2022 BMB No morphologic or immunophenotypic evidence for plasma cell myeloma, less than 1% overall polytypic plasma cells. CG WNL No clonal chromosomal abnormality was detected among the 20 metaphase cells analyzed.  No evidence was found by G-banding of the complex abnormal clone identified at diagnosis of this patient's myeloma. FLC WNL. SPEP/IFN Faint monoclonal IgG immunoglobulin of kappa light chain type on IFN.  -  ASSESSMENT:  --- 5/9/2022 BMB: No morphologic or immunophenotypic evidence of plasma cell neoplasm. Normocellular marrow for age (variable, overall 40%) with trilineage hematopoiesis, no overt dysplasia, no increase in blasts, and less than 1% polytypic plasma cells  -- SPEP PENDING (0.1 IN APRIL), IFN Monoclonal IgG immunoglobulin of kappa light chain type. UPEP/IFN No obvious monoclonal protein seen and the absence of a monoclonal immunoglobulins was confirmed by immunofixation of this same urine sample. TP/cR WNL. IgG/A/M low, CD4 WNL, FLC NA added to labs.  - VGPR with m-spike 0.1 and negative studies otherwise.       2. HEME/COAG:  Mild anemia, transfusion independent      3. ID.    Bactrim started 12/13/2021, on ACV and continue both for one year  Fluconazole until day +60, completed  Received euvsheld, influenza vaccine and covid boosters after day 100, will get locally not startig today due to commitments and concern with SE. Per updateds from Dr. Nair he has recevied the  vaccines and is agreeable to participating in the vaccine study, recommended expert opinion to wait 4 months from evusheld to start boosters.    4. GI/NUTRITION: Improving, no concerns      5. CV: aspirin, statin, over-the-counter fish oil, losartan. Return to his cardiologist for follow-up.  - Hx of CAD, MI at age 28 with stent placed  - HTN: 11/26 resumed losartan 50mg daily. Cont to hold hydrochlorothiazide      6. RENAL/FEN:  Cr and lytes stable    7. ENDOCRINE: 5/2022 s/p thyroidectomy, following with ENT and endocrine  - hx hypothyroidism, cont synthroid  - new diagnosis of papillary thyroid carcinoma per bx result from 11/2  - Zometa with oncology, ca/vitD  - Impotence work up by endocrine starting with hormonal evaluation and referral to urology as needed, he will contact them     8. Neuro: PN better on B complex, off gabapentin    9. Dermatology referral placed today    Summary: doing well, in VGPR, tolerating maintenance well.  Discussed pros and cons of maintenance and we agreed that he should continue as planned.      Plan  - vaccines to start on Dec 19 (ordered)  - continue follow up with Dr. Vazquez    40 minutes spent on the date of the encounter doing chart review, history and exam, documentation and further activities per the note      ITALIA GARNER MD

## 2022-12-09 ENCOUNTER — TELEPHONE (OUTPATIENT)
Dept: ONCOLOGY | Facility: CLINIC | Age: 52
End: 2022-12-09

## 2022-12-09 NOTE — TELEPHONE ENCOUNTER
Oral Chemotherapy Monitoring Program   Medication: Revlimid  Rx: 10mg PO daily on days 1 through 28 of 28 day cycle   Auth #: [id number]   Risk Category: Adult Male  Routine survey questions reviewed.   Rx to be Escribed to [facility]    Yu Goldberg  [facility]  Oncology Pharmacy Liaison   [email or web address]  [phone] (phone)  [phone] (fax)

## 2022-12-16 DIAGNOSIS — C90.00 MULTIPLE MYELOMA NOT HAVING ACHIEVED REMISSION (H): Primary | ICD-10-CM

## 2022-12-19 ENCOUNTER — INFUSION THERAPY VISIT (OUTPATIENT)
Dept: ONCOLOGY | Facility: CLINIC | Age: 52
End: 2022-12-19
Attending: STUDENT IN AN ORGANIZED HEALTH CARE EDUCATION/TRAINING PROGRAM
Payer: COMMERCIAL

## 2022-12-19 ENCOUNTER — ALLIED HEALTH/NURSE VISIT (OUTPATIENT)
Dept: TRANSPLANT | Facility: CLINIC | Age: 52
End: 2022-12-19
Attending: STUDENT IN AN ORGANIZED HEALTH CARE EDUCATION/TRAINING PROGRAM
Payer: COMMERCIAL

## 2022-12-19 ENCOUNTER — APPOINTMENT (OUTPATIENT)
Dept: LAB | Facility: CLINIC | Age: 52
End: 2022-12-19
Attending: STUDENT IN AN ORGANIZED HEALTH CARE EDUCATION/TRAINING PROGRAM
Payer: COMMERCIAL

## 2022-12-19 VITALS
RESPIRATION RATE: 16 BRPM | OXYGEN SATURATION: 100 % | HEART RATE: 68 BPM | SYSTOLIC BLOOD PRESSURE: 151 MMHG | BODY MASS INDEX: 25.63 KG/M2 | TEMPERATURE: 97.7 F | WEIGHT: 154 LBS | DIASTOLIC BLOOD PRESSURE: 88 MMHG

## 2022-12-19 VITALS
SYSTOLIC BLOOD PRESSURE: 151 MMHG | WEIGHT: 154 LBS | OXYGEN SATURATION: 100 % | TEMPERATURE: 97.7 F | RESPIRATION RATE: 16 BRPM | HEART RATE: 68 BPM | DIASTOLIC BLOOD PRESSURE: 88 MMHG | BODY MASS INDEX: 25.63 KG/M2

## 2022-12-19 DIAGNOSIS — C73 PAPILLARY MICROCARCINOMA OF THYROID (H): ICD-10-CM

## 2022-12-19 DIAGNOSIS — C90.01 MULTIPLE MYELOMA IN REMISSION (H): ICD-10-CM

## 2022-12-19 DIAGNOSIS — C90.00 MULTIPLE MYELOMA NOT HAVING ACHIEVED REMISSION (H): ICD-10-CM

## 2022-12-19 DIAGNOSIS — Z52.011 AUTOLOGOUS DONOR, STEM CELLS: Primary | ICD-10-CM

## 2022-12-19 DIAGNOSIS — Z92.21 STATUS POST CHEMOTHERAPY: ICD-10-CM

## 2022-12-19 DIAGNOSIS — C90.00 MULTIPLE MYELOMA NOT HAVING ACHIEVED REMISSION (H): Primary | ICD-10-CM

## 2022-12-19 DIAGNOSIS — R53.83 FATIGUE, UNSPECIFIED TYPE: ICD-10-CM

## 2022-12-19 DIAGNOSIS — Z94.84 HISTORY OF PERIPHERAL STEM CELL TRANSPLANT (H): ICD-10-CM

## 2022-12-19 DIAGNOSIS — I25.10 CVD (CARDIOVASCULAR DISEASE): ICD-10-CM

## 2022-12-19 DIAGNOSIS — E06.3 HYPOTHYROIDISM DUE TO HASHIMOTO'S THYROIDITIS: ICD-10-CM

## 2022-12-19 LAB
ALBUMIN SERPL BCG-MCNC: 4.3 G/DL (ref 3.5–5.2)
ALP SERPL-CCNC: 53 U/L (ref 40–129)
ALT SERPL W P-5'-P-CCNC: 13 U/L (ref 10–50)
ALT SERPL W P-5'-P-CCNC: 13 U/L (ref 10–50)
ANION GAP SERPL CALCULATED.3IONS-SCNC: 11 MMOL/L (ref 7–15)
AST SERPL W P-5'-P-CCNC: 21 U/L (ref 10–50)
BASOPHILS # BLD AUTO: 0 10E3/UL (ref 0–0.2)
BASOPHILS NFR BLD AUTO: 1 %
BILIRUB SERPL-MCNC: 0.4 MG/DL
BUN SERPL-MCNC: 11.9 MG/DL (ref 6–20)
CALCIUM SERPL-MCNC: 9.3 MG/DL (ref 8.6–10)
CHLORIDE SERPL-SCNC: 104 MMOL/L (ref 98–107)
CREAT SERPL-MCNC: 1.08 MG/DL (ref 0.67–1.17)
DEPRECATED HCO3 PLAS-SCNC: 22 MMOL/L (ref 22–29)
EOSINOPHIL # BLD AUTO: 0.2 10E3/UL (ref 0–0.7)
EOSINOPHIL NFR BLD AUTO: 6 %
ERYTHROCYTE [DISTWIDTH] IN BLOOD BY AUTOMATED COUNT: 17.2 % (ref 10–15)
FSH SERPL IRP2-ACNC: 12.7 MIU/ML (ref 1.5–12.4)
GFR SERPL CREATININE-BSD FRML MDRD: 82 ML/MIN/1.73M2
GLUCOSE SERPL-MCNC: 103 MG/DL (ref 70–99)
HCT VFR BLD AUTO: 33.4 % (ref 40–53)
HGB BLD-MCNC: 11 G/DL (ref 13.3–17.7)
IMM GRANULOCYTES # BLD: 0 10E3/UL
IMM GRANULOCYTES NFR BLD: 0 %
LH SERPL-ACNC: 7.1 MIU/ML (ref 1.7–8.6)
LYMPHOCYTES # BLD AUTO: 1.5 10E3/UL (ref 0.8–5.3)
LYMPHOCYTES NFR BLD AUTO: 40 %
MCH RBC QN AUTO: 30.2 PG (ref 26.5–33)
MCHC RBC AUTO-ENTMCNC: 32.9 G/DL (ref 31.5–36.5)
MCV RBC AUTO: 92 FL (ref 78–100)
MONOCYTES # BLD AUTO: 0.6 10E3/UL (ref 0–1.3)
MONOCYTES NFR BLD AUTO: 15 %
NEUTROPHILS # BLD AUTO: 1.4 10E3/UL (ref 1.6–8.3)
NEUTROPHILS NFR BLD AUTO: 38 %
NRBC # BLD AUTO: 0 10E3/UL
NRBC BLD AUTO-RTO: 0 /100
PLATELET # BLD AUTO: 299 10E3/UL (ref 150–450)
POTASSIUM SERPL-SCNC: 4 MMOL/L (ref 3.4–5.3)
PROT SERPL-MCNC: 6.1 G/DL (ref 6.4–8.3)
RBC # BLD AUTO: 3.64 10E6/UL (ref 4.4–5.9)
SHBG SERPL-SCNC: 25 NMOL/L (ref 11–80)
SODIUM SERPL-SCNC: 137 MMOL/L (ref 136–145)
TOTAL PROTEIN SERUM FOR ELP: 6.6 G/DL (ref 6.4–8.3)
TSH SERPL DL<=0.005 MIU/L-ACNC: 0.93 UIU/ML (ref 0.3–4.2)
WBC # BLD AUTO: 3.8 10E3/UL (ref 4–11)

## 2022-12-19 PROCEDURE — 94729 DIFFUSING CAPACITY: CPT | Performed by: INTERNAL MEDICINE

## 2022-12-19 PROCEDURE — 86334 IMMUNOFIX E-PHORESIS SERUM: CPT | Mod: 26

## 2022-12-19 PROCEDURE — 83521 IG LIGHT CHAINS FREE EACH: CPT

## 2022-12-19 PROCEDURE — 85041 AUTOMATED RBC COUNT: CPT

## 2022-12-19 PROCEDURE — 94375 RESPIRATORY FLOW VOLUME LOOP: CPT | Performed by: INTERNAL MEDICINE

## 2022-12-19 PROCEDURE — 83002 ASSAY OF GONADOTROPIN (LH): CPT

## 2022-12-19 PROCEDURE — 83001 ASSAY OF GONADOTROPIN (FSH): CPT

## 2022-12-19 PROCEDURE — G0009 ADMIN PNEUMOCOCCAL VACCINE: HCPCS | Performed by: INTERNAL MEDICINE

## 2022-12-19 PROCEDURE — 250N000021 HC RX MED A9270 GY (STAT IND- M) 250: Performed by: INTERNAL MEDICINE

## 2022-12-19 PROCEDURE — 90670 PCV13 VACCINE IM: CPT | Performed by: INTERNAL MEDICINE

## 2022-12-19 PROCEDURE — 90723 DTAP-HEP B-IPV VACCINE IM: CPT | Performed by: INTERNAL MEDICINE

## 2022-12-19 PROCEDURE — 84443 ASSAY THYROID STIM HORMONE: CPT

## 2022-12-19 PROCEDURE — 90648 HIB PRP-T VACCINE 4 DOSE IM: CPT | Performed by: INTERNAL MEDICINE

## 2022-12-19 PROCEDURE — 84460 ALANINE AMINO (ALT) (SGPT): CPT | Mod: 91

## 2022-12-19 PROCEDURE — 250N000011 HC RX IP 250 OP 636: Performed by: INTERNAL MEDICINE

## 2022-12-19 PROCEDURE — 84270 ASSAY OF SEX HORMONE GLOBUL: CPT

## 2022-12-19 PROCEDURE — 84460 ALANINE AMINO (ALT) (SGPT): CPT

## 2022-12-19 PROCEDURE — 90471 IMMUNIZATION ADMIN: CPT | Performed by: INTERNAL MEDICINE

## 2022-12-19 PROCEDURE — 84165 PROTEIN E-PHORESIS SERUM: CPT | Mod: 26

## 2022-12-19 PROCEDURE — 84155 ASSAY OF PROTEIN SERUM: CPT

## 2022-12-19 PROCEDURE — 84403 ASSAY OF TOTAL TESTOSTERONE: CPT

## 2022-12-19 PROCEDURE — 86334 IMMUNOFIX E-PHORESIS SERUM: CPT | Performed by: PATHOLOGY

## 2022-12-19 PROCEDURE — 80053 COMPREHEN METABOLIC PANEL: CPT

## 2022-12-19 PROCEDURE — 84165 PROTEIN E-PHORESIS SERUM: CPT | Mod: TC | Performed by: PATHOLOGY

## 2022-12-19 PROCEDURE — 96401 CHEMO ANTI-NEOPL SQ/IM: CPT

## 2022-12-19 PROCEDURE — 96374 THER/PROPH/DIAG INJ IV PUSH: CPT

## 2022-12-19 PROCEDURE — 90472 IMMUNIZATION ADMIN EACH ADD: CPT | Performed by: INTERNAL MEDICINE

## 2022-12-19 PROCEDURE — 36415 COLL VENOUS BLD VENIPUNCTURE: CPT

## 2022-12-19 PROCEDURE — 258N000003 HC RX IP 258 OP 636: Performed by: STUDENT IN AN ORGANIZED HEALTH CARE EDUCATION/TRAINING PROGRAM

## 2022-12-19 PROCEDURE — 250N000011 HC RX IP 250 OP 636: Performed by: STUDENT IN AN ORGANIZED HEALTH CARE EDUCATION/TRAINING PROGRAM

## 2022-12-19 PROCEDURE — 94726 PLETHYSMOGRAPHY LUNG VOLUMES: CPT | Performed by: INTERNAL MEDICINE

## 2022-12-19 RX ORDER — LEVOTHYROXINE SODIUM 125 UG/1
125 TABLET ORAL DAILY
Qty: 90 TABLET | Refills: 0 | Status: SHIPPED | OUTPATIENT
Start: 2022-12-19 | End: 2023-01-09

## 2022-12-19 RX ORDER — ZOLEDRONIC ACID 0.04 MG/ML
4 INJECTION, SOLUTION INTRAVENOUS ONCE
Status: COMPLETED | OUTPATIENT
Start: 2022-12-19 | End: 2022-12-19

## 2022-12-19 RX ADMIN — DARATUMUMAB AND HYALURONIDASE-FIHJ (HUMAN RECOMBINANT) 1800 MG: 1800; 30000 INJECTION SUBCUTANEOUS at 12:23

## 2022-12-19 RX ADMIN — DIPHTHERIA AND TETANUS TOXOIDS AND ACELLULAR PERTUSSIS ADSORBED, HEPATITIS B (RECOMBINANT) AND INACTIVATED POLIOVIRUS VACCINE COMBINED 0.5 ML: 25; 10; 25; 25; 8; 10; 40; 8; 32 INJECTION, SUSPENSION INTRAMUSCULAR at 15:07

## 2022-12-19 RX ADMIN — PNEUMOCOCCAL 13-VALENT CONJUGATE VACCINE 0.5 ML: 2.2; 2.2; 2.2; 2.2; 2.2; 4.4; 2.2; 2.2; 2.2; 2.2; 2.2; 2.2; 2.2 INJECTION, SUSPENSION INTRAMUSCULAR at 15:11

## 2022-12-19 RX ADMIN — ZOLEDRONIC ACID 4 MG: 0.04 INJECTION, SOLUTION INTRAVENOUS at 11:58

## 2022-12-19 RX ADMIN — HAEMOPHILUS B POLYSACCHARIDE CONJUGATE VACCINE FOR INJ 0.5 ML: RECON SOLN at 15:18

## 2022-12-19 RX ADMIN — SODIUM CHLORIDE 250 ML: 9 INJECTION, SOLUTION INTRAVENOUS at 11:58

## 2022-12-19 ASSESSMENT — PAIN SCALES - GENERAL
PAINLEVEL: MODERATE PAIN (5)
PAINLEVEL: MODERATE PAIN (5)

## 2022-12-19 NOTE — NURSING NOTE
Chief Complaint   Patient presents with     Blood Draw     Labs drawn from PIV placed by RN. Line flushed with saline. Vitals taken. Pt checked in for appointment(s).      Karla MEANS RN PHN BSN  BMT/Oncology Lab

## 2022-12-19 NOTE — PATIENT INSTRUCTIONS
[facility] Triage and after hours / weekends / holidays:  [phone]    Please call the triage or after hours line if you experience a temperature greater than or equal to 100.4, shaking chills, have uncontrolled nausea, vomiting and/or diarrhea, dizziness, shortness of breath, chest pain, bleeding, unexplained bruising, or if you have any other new/concerning symptoms, questions or concerns.      If you are having any concerning symptoms or wish to speak to a provider before your next infusion visit, please call your care coordinator or triage to notify them so we can adequately serve you.     If you need a refill on a narcotic prescription or other medication, please call before your infusion appointment.

## 2022-12-19 NOTE — PROGRESS NOTES
Infusion Nursing Note:  June Ronquillo presents today for Cycle 12 Day 1 darzalex faspro; zometa.    Patient seen by provider today: No   present during visit today: Not Applicable.    Note: Logan presents today feeling overall well. Has a migraine, heat pack provided for comfort. Thinks it is related to eating a lot of salt yesterday. No changes in vision or other neurological symptoms. Denies nausea/vomiting. Denies fevers/chills. Denies SOB, cough, chest pain, or dizziness/lightheadedness. Denies constipation/diarrhea. Denies urinary issues. Endorses unchanged neuropathy. Offers no new concerns at this appointment.    Intravenous Access:  Peripheral IV placed.    Treatment Conditions:     Latest Reference Range & Units 12/19/22 11:05   Sodium 136 - 145 mmol/L 137   Potassium 3.4 - 5.3 mmol/L 4.0   Chloride 98 - 107 mmol/L 104   Carbon Dioxide (CO2) 22 - 29 mmol/L 22   Urea Nitrogen 6.0 - 20.0 mg/dL 11.9   Creatinine 0.67 - 1.17 mg/dL 1.08   GFR Estimate >60 mL/min/1.73m2 82   Calcium 8.6 - 10.0 mg/dL 9.3   Anion Gap 7 - 15 mmol/L 11   Albumin 3.5 - 5.2 g/dL 4.3   Protein Total 6.4 - 8.3 g/dL 6.1 (L)   Alkaline Phosphatase 40 - 129 U/L 53   ALT 10 - 50 U/L 13   AST 10 - 50 U/L 21   Bilirubin Total <=1.2 mg/dL 0.4   Glucose 70 - 99 mg/dL 103 (H)   WBC 4.0 - 11.0 10e3/uL 3.8 (L)   Hemoglobin 13.3 - 17.7 g/dL 11.0 (L)   Hematocrit 40.0 - 53.0 % 33.4 (L)   Platelet Count 150 - 450 10e3/uL 299   RBC Count 4.40 - 5.90 10e6/uL 3.64 (L)   MCV 78 - 100 fL 92   MCH 26.5 - 33.0 pg 30.2   MCHC 31.5 - 36.5 g/dL 32.9   RDW 10.0 - 15.0 % 17.2 (H)   % Neutrophils % 38   % Lymphocytes % 40   % Monocytes % 15   % Eosinophils % 6   % Basophils % 1   Absolute Basophils 0.0 - 0.2 10e3/uL 0.0   Absolute Eosinophils 0.0 - 0.7 10e3/uL 0.2   Absolute Immature Granulocytes <=0.4 10e3/uL 0.0   Absolute Lymphocytes 0.8 - 5.3 10e3/uL 1.5   Absolute Monocytes 0.0 - 1.3 10e3/uL 0.6   % Immature Granulocytes % 0   Absolute  Neutrophils 1.6 - 8.3 10e3/uL 1.4 (L)   Absolute NRBCs 10e3/uL 0.0   NRBCs per 100 WBC <1 /100 0     Results reviewed, labs MET treatment parameters, ok to proceed with treatment.    Post Infusion Assessment:  Patient tolerated zometa infusion without incident.  Patient tolerated darzalex faspro injection to RLQ abdomen without incident.  Blood return noted pre and post infusion.  Site patent and intact, free from redness, edema or discomfort.  No evidence of extravasations.  Access discontinued per protocol.     Discharge Plan:   Patient declined prescription refills.  Discharge instructions reviewed with: Patient.  Patient and/or family verbalized understanding of discharge instructions and all questions answered.  AVS to patient via [employer]  Patient will return 01/11/23 for next appointment with Laura Ballesteros CNP.   Patient discharged in stable condition accompanied by: self.  Departure Mode: Ambulatory.      Lindsay Brownlee RN

## 2022-12-19 NOTE — NURSING NOTE
Vaccines administered without complication per orders from Dr Vazquez. Patient tolerated well and was discharged. See MAR for details.        Macarena Daley on 12/19/2022 at 3:24 PM

## 2022-12-20 DIAGNOSIS — Z00.6 RESEARCH STUDY PATIENT: Primary | ICD-10-CM

## 2022-12-20 LAB
DLCOCOR-%PRED-PRE: 78 %
DLCOCOR-PRE: 19.23 ML/MIN/MMHG
DLCOUNC-%PRED-PRE: 69 %
DLCOUNC-PRE: 16.94 ML/MIN/MMHG
DLCOUNC-PRED: 24.45 ML/MIN/MMHG
ERV-%PRED-PRE: 103 %
ERV-PRE: 1.11 L
ERV-PRED: 1.07 L
EXPTIME-PRE: 6.24 SEC
FEF2575-%PRED-PRE: 89 %
FEF2575-PRE: 2.5 L/SEC
FEF2575-PRED: 2.8 L/SEC
FEFMAX-%PRED-PRE: 108 %
FEFMAX-PRE: 9.29 L/SEC
FEFMAX-PRED: 8.53 L/SEC
FEV1-%PRED-PRE: 75 %
FEV1-PRE: 2.27 L
FEV1FEV6-PRE: 83 %
FEV1FEV6-PRED: 80 %
FEV1FVC-PRE: 83 %
FEV1FVC-PRED: 80 %
FEV1SVC-PRE: 80 %
FEV1SVC-PRED: 71 %
FIFMAX-PRE: 5.13 L/SEC
FRCPLETH-%PRED-PRE: 80 %
FRCPLETH-PRE: 2.62 L
FRCPLETH-PRED: 3.26 L
FVC-%PRED-PRE: 73 %
FVC-PRE: 2.73 L
FVC-PRED: 3.74 L
IC-%PRED-PRE: 54 %
IC-PRE: 1.74 L
IC-PRED: 3.19 L
KAPPA LC FREE SER-MCNC: 0.87 MG/DL (ref 0.33–1.94)
KAPPA LC FREE/LAMBDA FREE SER NEPH: 1.16 {RATIO} (ref 0.26–1.65)
LAMBDA LC FREE SERPL-MCNC: 0.75 MG/DL (ref 0.57–2.63)
RVPLETH-%PRED-PRE: 71 %
RVPLETH-PRE: 1.52 L
RVPLETH-PRED: 2.12 L
TLCPLETH-%PRED-PRE: 71 %
TLCPLETH-PRE: 4.36 L
TLCPLETH-PRED: 6.11 L
VA-%PRED-PRE: 70 %
VA-PRE: 3.89 L
VC-%PRED-PRE: 66 %
VC-PRE: 2.85 L
VC-PRED: 4.25 L

## 2022-12-21 LAB
ALBUMIN SERPL ELPH-MCNC: 4.6 G/DL (ref 3.7–5.1)
ALPHA1 GLOB SERPL ELPH-MCNC: 0.3 G/DL (ref 0.2–0.4)
ALPHA2 GLOB SERPL ELPH-MCNC: 0.5 G/DL (ref 0.5–0.9)
B-GLOBULIN SERPL ELPH-MCNC: 0.8 G/DL (ref 0.6–1)
GAMMA GLOB SERPL ELPH-MCNC: 0.4 G/DL (ref 0.7–1.6)
M PROTEIN SERPL ELPH-MCNC: 0.1 G/DL
PROT PATTERN SERPL ELPH-IMP: ABNORMAL
PROT PATTERN SERPL IFE-IMP: NORMAL
TESTOST FREE SERPL-MCNC: 5.64 NG/DL
TESTOST SERPL-MCNC: 250 NG/DL (ref 240–950)

## 2022-12-22 ENCOUNTER — TELEPHONE (OUTPATIENT)
Dept: ONCOLOGY | Facility: CLINIC | Age: 52
End: 2022-12-22

## 2022-12-22 NOTE — TELEPHONE ENCOUNTER
Oral Chemotherapy Monitoring Program    Subjective/Objective:  June Ronquillo is a 52 year old male contacted by phone for a follow-up visit for oral chemotherapy. Today Juen did not have any concerns regarding safety or efficacy of the Revlimid (lenalidomide), and reports being adherent to the medication dose and regimen. Mentioned having some constipation here and there, but manages this with adequate hydration and fiber intake. Does not report taking any new medications, and did not report any other side effects at this time.    ORAL CHEMOTHERAPY 10/5/2022 11/4/2022 11/4/2022 11/10/2022 12/8/2022 12/22/2022 12/22/2022   Assessment Type Refill Refill Monthly Follow up Lab Monitoring Chart Review;Refill Left Voicemail;Monthly Follow up Monthly Follow up   Stop Date - - - - - - -   Reason for Discontinuation - - - - - - -   Diagnosis Code Multiple Myeloma Multiple Myeloma Multiple Myeloma Multiple Myeloma Multiple Myeloma Multiple Myeloma Multiple Myeloma   Providers Dr. George Vazquez   [facility]   Drug Name Revlimid (lenalidomide) Revlimid (lenalidomide) Revlimid (lenalidomide) Revlimid (lenalidomide) Revlimid (lenalidomide) Revlimid (lenalidomide) Revlimid (lenalidomide)   Dose 10 mg 10 mg 10 mg 10 mg 10 mg 10 mg 10 mg   Current Schedule Daily Daily Daily Daily Daily Daily Daily   Cycle Details Continuous Continuous Continuous Continuous Continuous Continuous Continuous   Start Date of Last Cycle - - - - - - -   Planned next cycle start date - - - - - - -   Doses missed in last 2 weeks - - - - - - 0   Adherence Assessment - - - - - - Adherent   Adverse Effects - - - - - - Constipation   Constipation - - - - - - Grade 1   Pharmacist Intervention(constipation) - - - - - - Yes   Intervention(s) - - - - - - Patient education   Any new drug interactions? - - - - - - No   Is the dose  "as ordered appropriate for the patient? - - - - - - Yes   Is the patient currently in pain? - - - - - - No   Has the patient been assessed within the past 6 months for depression? - - - - - - No   Has the patient missed any days of school, work, or other routine activity? - - - - - - No   Since the last time we talked, have you been hospitalized or used the emergency room? - - - - - - No       Last PHQ-2 Score on record:   PHQ-2 ( 1999 [employer]) 9/21/2022 4/19/2022   Q1: Little interest or pleasure in doing things 0 0   Q2: Feeling down, depressed or hopeless 0 0   PHQ-2 Score 0 0   PHQ-2 Total Score (12-17 Years)- Positive if 3 or more points; Administer PHQ-A if positive - -   Q1: Little interest or pleasure in doing things Not at all Not at all   Q2: Feeling down, depressed or hopeless Not at all Not at all   PHQ-2 Score 0 0       Vitals:  BP:   BP Readings from Last 1 Encounters:   12/19/22 (!) 151/88     Wt Readings from Last 1 Encounters:   12/19/22 69.9 kg (154 lb)     Estimated body surface area is 1.79 meters squared as calculated from the following:    Height as of 12/5/22: 1.651 m (5' 5\").    Weight as of 12/19/22: 69.9 kg (154 lb).    Labs:  _  Result Component Current Result Ref Range   Sodium 137 (12/19/2022) 136 - 145 mmol/L     _  Result Component Current Result Ref Range   Potassium 4.0 (12/19/2022) 3.4 - 5.3 mmol/L     _  Result Component Current Result Ref Range   Calcium 9.3 (12/19/2022) 8.6 - 10.0 mg/dL     No results found for Mag within last 30 days.     No results found for Phos within last 30 days.     _  Result Component Current Result Ref Range   Albumin 4.3 (12/19/2022) 3.5 - 5.2 g/dL     _  Result Component Current Result Ref Range   Urea Nitrogen 11.9 (12/19/2022) 6.0 - 20.0 mg/dL     _  Result Component Current Result Ref Range   Creatinine 1.08 (12/19/2022) 0.67 - 1.17 mg/dL     _  Result Component Current Result Ref Range   AST 21 (12/19/2022) 10 - 50 U/L     _  Result Component " Current Result Ref Range   ALT 13 (12/19/2022) 10 - 50 U/L     _  Result Component Current Result Ref Range   Bilirubin Total 0.4 (12/19/2022) <=1.2 mg/dL     _  Result Component Current Result Ref Range   WBC Count 3.8 (L) (12/19/2022) 4.0 - 11.0 10e3/uL     _  Result Component Current Result Ref Range   Hemoglobin 11.0 (L) (12/19/2022) 13.3 - 17.7 g/dL     _  Result Component Current Result Ref Range   Platelet Count 299 (12/19/2022) 150 - 450 10e3/uL     No results found for ANC within last 30 days.     _  Result Component Current Result Ref Range   Absolute Neutrophils 1.4 (L) (12/19/2022) 1.6 - 8.3 10e3/uL      Assessment/Plan:  Since there were no adverse drug events reported today, Jitendrapal should continue to take the Revlimid as scheduled. Furthermore, lab results from 12/19 showed CrCl was 82 mL/min, so the dose should be appropriate based on readings. No further questions from the patient at this time, but does have our number saved in case if something were to come up and was reminded of appointments occurring next month.    Follow-Up:  1/16/23 labs and provider appointment @ 3:00PM    Refill Due:  12/27/22    Ihsan Prince  Pharmacy Intern  Oral Chemotherapy Monitoring Program  [facility]  [phone]

## 2022-12-22 NOTE — TELEPHONE ENCOUNTER
Oral Chemotherapy Monitoring Program    Placed call to patient in follow up of Revlimid (lenalidomide) therapy.    Left message to please call back in follow up of therapy. No patient or drug names were mentioned.    Ihsan Prince  Pharmacy Intern  Oral Chemotherapy Monitoring Program  [facility]  [phone]

## 2022-12-27 DIAGNOSIS — C90.00 MULTIPLE MYELOMA NOT HAVING ACHIEVED REMISSION (H): Primary | ICD-10-CM

## 2022-12-27 RX ORDER — LENALIDOMIDE 10 MG/1
10 CAPSULE ORAL DAILY
Qty: 28 CAPSULE | Refills: 0 | Status: SHIPPED | OUTPATIENT
Start: 2022-12-27 | End: 2023-01-09

## 2022-12-29 ENCOUNTER — HOSPITAL ENCOUNTER (OUTPATIENT)
Dept: ULTRASOUND IMAGING | Facility: CLINIC | Age: 52
Discharge: HOME | End: 2022-12-29
Attending: INTERNAL MEDICINE | Admitting: INTERNAL MEDICINE
Payer: COMMERCIAL

## 2022-12-29 DIAGNOSIS — E06.3 HYPOTHYROIDISM DUE TO HASHIMOTO'S THYROIDITIS: ICD-10-CM

## 2022-12-29 DIAGNOSIS — C73 PAPILLARY MICROCARCINOMA OF THYROID (H): ICD-10-CM

## 2022-12-29 PROCEDURE — 76536 US EXAM OF HEAD AND NECK: CPT | Mod: LT,52

## 2022-12-29 PROCEDURE — 76536 US EXAM OF HEAD AND NECK: CPT | Mod: 26 | Performed by: RADIOLOGY

## 2023-01-02 DIAGNOSIS — Z01.10 ENCOUNTER FOR HEARING TEST: Primary | ICD-10-CM

## 2023-01-03 ENCOUNTER — PRE VISIT (OUTPATIENT)
Dept: OTOLARYNGOLOGY | Facility: CLINIC | Age: 53
End: 2023-01-03

## 2023-01-05 ENCOUNTER — TELEPHONE (OUTPATIENT)
Dept: ONCOLOGY | Facility: CLINIC | Age: 53
End: 2023-01-05

## 2023-01-05 NOTE — TELEPHONE ENCOUNTER
Oral Chemotherapy Monitoring Program   Medication: Revlimid  Rx: 10mg PO daily on days 1 through 28 of 28 day cycle   Auth #: [id number]   Risk Category: Adult Male  Routine survey questions reviewed.   Rx to be Escribed to [facility]    Yu Goldberg  [facility] [place] [facility]  Oncology Pharmacy Liaison   [email or web address]  [phone] (phone)  [phone] (fax)

## 2023-01-09 ENCOUNTER — VIRTUAL VISIT (OUTPATIENT)
Dept: ENDOCRINOLOGY | Facility: CLINIC | Age: 53
End: 2023-01-09
Payer: COMMERCIAL

## 2023-01-09 DIAGNOSIS — R73.03 PRE-DIABETES: ICD-10-CM

## 2023-01-09 DIAGNOSIS — R53.83 FATIGUE, UNSPECIFIED TYPE: ICD-10-CM

## 2023-01-09 DIAGNOSIS — E06.3 HYPOTHYROIDISM DUE TO HASHIMOTO'S THYROIDITIS: ICD-10-CM

## 2023-01-09 DIAGNOSIS — C73 PAPILLARY MICROCARCINOMA OF THYROID (H): Primary | ICD-10-CM

## 2023-01-09 PROCEDURE — 99214 OFFICE O/P EST MOD 30 MIN: CPT | Mod: 95 | Performed by: INTERNAL MEDICINE

## 2023-01-09 RX ORDER — LEVOTHYROXINE SODIUM 125 UG/1
125 TABLET ORAL DAILY
Qty: 90 TABLET | Refills: 2 | Status: SHIPPED | OUTPATIENT
Start: 2023-01-09 | End: 2023-12-13

## 2023-01-09 NOTE — PATIENT INSTRUCTIONS
-8 AM labs when next able to check testosterone level  -We will check thyroid function tests in about 3 months (we will order these after testosterone is resulted)  -Continue levothyroxine 125 mcg daily (refill sent to pharmacy)  -Return for a follow-up visit in 6 months (ideally in person), with neck ultrasound and lab 2 weeks before visit--neck ultrasound to be completed at the [facility] in [place]  -We will communicate results via [employer], or if needed by phone

## 2023-01-09 NOTE — LETTER
1/9/2023       RE: June Ronquillo  [address]  [facility] [id number]     Dear Colleague,    Thank you for referring your patient, June Ronquillo, to the [facility] at [facility]. Please see a copy of my visit note below.      ENDOCRINOLOGY VIDEO FOLLOW-UP         HISTORY OF PRESENT ILLNESS    June Ronquillo is seen via billable video visit for follow-up on the  issues outlined below.     1.  PTC.  Logan has not noted a change in the feel of his neck.  No dysphagia.    PET CT of the neck revealed mild FDG uptake in the bilateral level 2 cervical nodes on 12/5/2022.    Neck ultrasound was performed on 12/29/2022 showed no suspicious appearing lymph nodes, as detailed below.    2. Hypothyroidism.  He continues on levothyroxine 125 mcg daily.    He had noted significant fatigue at last visit with me in 6/2022.  He finds his energy is overall improved but he still has episodic fatigue and diminution in libido; he may also sometimes note erectile dysfunction, although he has not noted this consistently.  No tremors, change in temperature tolerance, or palpitations.    Pertinent endocrine and related history:  1. Hypothyroidism. Diagnosed in 1/2021, attributed to Hashimoto's thyroiditis.    2. PTC. During imaging for evaluation of multiple myeloma, he was incidentally discovered to have increased FDG uptake in thyroid nodules in 8/2021 and in 10/2021, with findings as below:  - PET/CT 8/31/2021: Hypermetabolic cervical lymph nodes, 3 separate hypermetabolic foci in the thyroid gland (2 in the left lobe, 1 in the right lobe).  CT appearance of nodules was hypoattenuating with a bulky calcification in one of the left thyroid lobe nodules.  SUV max of thyroid lesions 4.95.  - PET/CT 10/15/2021: Previous mildly enlarged FDG avid right upper cervical lymph nodes were resolved, likely inflammatory.  Few tiny thyroid  nodules, similar in size, with decreased (now at most mild) uptake (SUV max 2.5).  -He does not have history of excessive head or neck radiation exposure.  No family history of thyroid disease/thyroid cancer.  -After initial visit in 10/2021, I referred patient for thyroid US which was performed on 10/28/2021. This showed bilateral thyroid nodules and heterogenous thyroid parenchyma.  -He had FNA of left lobe nodule performed on 11/2/2021. Cytology returned consistent with papillary thyroid carcinoma.  -Detailed neck US performed 11/8/2021 showed no suspicious appearing cervical nodes.  -Underwent total thyroidectomy with left selective neck dissection to include levels 6 and 7 on 5/3/2022 with Dr. Sepulveda.  Surgical pathology was as follows:  A.  THYROID GLAND, TOTAL THYROIDECTOMY:  -PAPILLARY THYROID MICROCARCINOMA, conventional/classic type, 0.6 cm in greatest dimension.  -Tumor is located in the mid left lobe, encapsulated, and limited to thyroid gland (no extrathyroidal extension identified).  -Margins are negative for tumor (closest is anterior left lobe surface at 1 mm).  -Benign adenomatoid and colloid nodules up to 0.6 cm in greatest dimension.  -Chronic lymphocytic thyroiditis.  -Biopsy site changes.  -Fragments of parathyroid tissue with normal cellularity (adjacent to mid/lower right lobe).  -One perithyroid lymph node, negative for malignancy (0/1).  -AJCC pathologic staging is pT1a N0a.  -See comment and synoptic report.     B.  LEFT LEVEL 6 AND 7 NODE, EXCISION:  -Parathyroid gland with normal cellularity.  -No lymph node identified.  -Benign fibroadipose tissue.    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    3. Multiple myeloma. Diagnosed in 4/2021.  He was initiated on treatment with daratumumab, Velcade, Revlimid and dexamethasone.  S/p autologous PBSCT.   4. High risk for diabetes.    Pertinent Social History: , has 2 children ages 22 and 24.  Is an  educator and .    PAST MEDICAL HISTORY  Past Medical History:   Diagnosis Date     CAD (coronary artery disease)     s/p stent to CFX     Heart attack (H)      Hyperlipidemia LDL goal <100      Hypertension      Stented coronary artery      Thyroid disease        MEDICATIONS  Current Outpatient Medications   Medication Sig Dispense Refill     acyclovir (ZOVIRAX) 800 MG tablet Take 1 tablet (800 mg) by mouth 2 times daily 180 tablet 1     aspirin (ASA) 81 MG EC tablet Take 1 tablet (81 mg) by mouth daily       calcium carb-cholecalciferol (OS-HARDEEP) 500-200 MG-UNIT tablet Take 1 tablet by mouth daily 90 tablet 3     calcium carbonate (TUMS) 500 MG chewable tablet Take 2 tablets (1,000 mg) by mouth 3 times daily 90 tablet 1     fluticasone (FLONASE) 50 MCG/ACT nasal spray Spray 1 spray into both nostrils daily 9.9 mL 0     LENalidomide (REVLIMID) 10 MG CAPS capsule Take 1 capsule (10 mg) by mouth daily 28 capsule 0     LENalidomide (REVLIMID) 10 MG CAPS capsule Take 1 capsule (10 mg) by mouth daily 28 capsule 0     LENalidomide (REVLIMID) 10 MG CAPS capsule Take 1 capsule (10 mg) by mouth daily 28 capsule 0     levothyroxine (SYNTHROID/LEVOTHROID) 125 MCG tablet Take 1 tablet (125 mcg) by mouth daily 90 tablet 0     losartan (COZAAR) 50 MG tablet Take 1 tablet (50 mg) by mouth daily 90 tablet 3     meclizine (ANTIVERT) 25 MG tablet Take 1 tablet (25 mg) by mouth 3 times daily as needed for dizziness 30 tablet 0     potassium chloride ER (KLOR-CON M) 10 MEQ CR tablet Take 2 tablets (20 mEq) by mouth daily 60 tablet 3     rosuvastatin (CRESTOR) 40 MG tablet Take 1 tablet (40 mg) by mouth daily 90 tablet 3     sulfamethoxazole-trimethoprim (BACTRIM DS) 800-160 MG tablet Take 1 tablet by mouth 2 times daily Take on Mondays and Tuesdays (Patient not taking: Reported on 12/19/2022) 16 tablet 3     vitamin B complex with vitamin C (STRESS TAB) tablet Take 1 tablet by mouth daily 90 tablet 3       Allergies,  family, and social history were reviewed and documented as needed in EHR.     REVIEW OF SYSTEMS  A focused ROS was performed, with pertinent positives and negatives as noted in the HPI.    PHYSICAL EXAM  There were no vitals taken for this visit.  There is no height or weight on file to calculate BMI.  Constitutional: Patient is alert, oriented and appears in no acute distress.  Eyes: Eyes grossly normal to inspection, EOMI, no stare, lid lag, or retraction; no conjunctival injection.  ENMT: Lips are without lesions.   Neck: No visible goiter or neck mass.  Respiratory: No audible wheeze or cough. No visible cyanosis. No visible increased work of breathing.  Neurological: Alert and oriented times 3.  Cranial nerves grossly intact.      DATA REVIEW  Each of the following laboratory and/or imaging studies were reviewed.    Component      Latest Ref Rng & Units 12/19/2022   Free Testosterone Calculated      ng/dL 5.64   Testosterone Total      240 - 950 ng/dL 250   TSH      0.30 - 4.20 uIU/mL 0.93   Luteinizing Hormone      1.7 - 8.6 mIU/mL 7.1   FSH      1.5 - 12.4 mIU/mL 12.7 (H)   Sex Hormone Binding Globulin      11 - 80 nmol/L 25       TG panel 6/24/2022, as summarized in assessment and plan.    ASSESSMENT  1.  Papillary thyroid microcarcinoma.  Status post total thyroidectomy and left selective neck dissection.  T1a NX MX.  Review of surgical pathology indicates low risk of recurrence/persistent disease based on KRISTEN risk stratification system, therefore radioiodine therapy has been deferred to date.  I notes that PET CT of the neck on 12/5/2022 showed FDG uptake in level 2 cervical lymph nodes: I wonder if these may be reactive since detailed neck ultrasound performed on 12/29/2022 showed no suspicious appearing lymph nodes.  Additionally, thyroglobulin panel in 6/2022 showed negative thyroglobulin antibodies with thyroglobulin level of less than 0.1 ng/mL.  Would follow carefully: Anticipate detailed neck  ultrasound again in 6 months, along with thyroglobulin panel.  Meanwhile, continue thyroid hormone suppression as below.    2.  Hypothyroidism.  Diagnosed in 1/2021, preceding thyroidectomy and likely due to Hashimoto's thyroiditis.  Given response to treatment (with low thyroglobulin level and no suspicious nodes on ultrasound) and given low KRISTEN risk status, would liberalize TSH goal to between lower limit of normal and 2.  Previsit TSH is in ideal range.  Moreover, Logan overall appears to be clinically euthyroid.  Continue periodic thyroid function monitoring.    3.  Fatigue.  With intermittent diminution in libido, although now not occurring as consistently as before.  Testosterone level was at the very lower end of normal, although it was drawn in the latter part of the morning.  Would recheck testosterone in the early morning to determine if additional evaluation is needed.    4.  Multiple myeloma.  Following in oncology.  Status post chemotherapy and autologous PBSCT.    5.  High risk for diabetes.  Hemoglobin A1c 6% on 1/25/2021.  In 6/2023, hemoglobin A1c was 5.5%.  Check A1c with next lab draw.    PLAN  -8 AM labs when next able to check testosterone level  -We will check thyroid function tests in about 3 months (we will order these after testosterone is resulted)  -Continue levothyroxine 125 mcg daily (refill sent to pharmacy)  -Return for a follow-up visit in 6 months (ideally in person), with neck ultrasound and lab 2 weeks before visit--neck ultrasound to be completed at the [facility] in [place] (if surveillance testing continues to show optimal response to treatment, can reduce frequency of visits to annual)  -We will communicate results via [employer], or if needed by phone      Orders Placed This Encounter   Procedures     US Head Neck Soft Tissue     Hemoglobin A1c     Luteinizing Hormone     Follicle stimulating hormone     Testosterone Free and Total     Video start time: 11:34 AM  Video end time: 11:42  MELECIO    Physician location: Offsite    Video platform: [employer] spent a total of 38 minutes on the date of encounter reviewing medical records, evaluating the patient, coordinating care and documenting in the EHR, as detailed above.        Makeda Elder MD   Division of Diabetes, Endocrinology and Metabolism  Department of Medicine

## 2023-01-09 NOTE — PROGRESS NOTES
ENDOCRINOLOGY VIDEO FOLLOW-UP         HISTORY OF PRESENT ILLNESS    June Ronquillo is seen via billable video visit for follow-up on the  issues outlined below.     1.  PTC.  Logan has not noted a change in the feel of his neck.  No dysphagia.    PET CT of the neck revealed mild FDG uptake in the bilateral level 2 cervical nodes on 12/5/2022.    Neck ultrasound was performed on 12/29/2022 showed no suspicious appearing lymph nodes, as detailed below.    2. Hypothyroidism.  He continues on levothyroxine 125 mcg daily.    He had noted significant fatigue at last visit with me in 6/2022.  He finds his energy is overall improved but he still has episodic fatigue and diminution in libido; he may also sometimes note erectile dysfunction, although he has not noted this consistently.  No tremors, change in temperature tolerance, or palpitations.    Pertinent endocrine and related history:  1. Hypothyroidism. Diagnosed in 1/2021, attributed to Hashimoto's thyroiditis.    2. PTC. During imaging for evaluation of multiple myeloma, he was incidentally discovered to have increased FDG uptake in thyroid nodules in 8/2021 and in 10/2021, with findings as below:  - PET/CT 8/31/2021: Hypermetabolic cervical lymph nodes, 3 separate hypermetabolic foci in the thyroid gland (2 in the left lobe, 1 in the right lobe).  CT appearance of nodules was hypoattenuating with a bulky calcification in one of the left thyroid lobe nodules.  SUV max of thyroid lesions 4.95.  - PET/CT 10/15/2021: Previous mildly enlarged FDG avid right upper cervical lymph nodes were resolved, likely inflammatory.  Few tiny thyroid nodules, similar in size, with decreased (now at most mild) uptake (SUV max 2.5).  -He does not have history of excessive head or neck radiation exposure.  No family history of thyroid disease/thyroid cancer.  -After initial visit in 10/2021, I referred patient for thyroid US which was performed on 10/28/2021. This showed bilateral  thyroid nodules and heterogenous thyroid parenchyma.  -He had FNA of left lobe nodule performed on 11/2/2021. Cytology returned consistent with papillary thyroid carcinoma.  -Detailed neck US performed 11/8/2021 showed no suspicious appearing cervical nodes.  -Underwent total thyroidectomy with left selective neck dissection to include levels 6 and 7 on 5/3/2022 with Dr. Sepulveda.  Surgical pathology was as follows:  A.  THYROID GLAND, TOTAL THYROIDECTOMY:  -PAPILLARY THYROID MICROCARCINOMA, conventional/classic type, 0.6 cm in greatest dimension.  -Tumor is located in the mid left lobe, encapsulated, and limited to thyroid gland (no extrathyroidal extension identified).  -Margins are negative for tumor (closest is anterior left lobe surface at 1 mm).  -Benign adenomatoid and colloid nodules up to 0.6 cm in greatest dimension.  -Chronic lymphocytic thyroiditis.  -Biopsy site changes.  -Fragments of parathyroid tissue with normal cellularity (adjacent to mid/lower right lobe).  -One perithyroid lymph node, negative for malignancy (0/1).  -AJCC pathologic staging is pT1a N0a.  -See comment and synoptic report.     B.  LEFT LEVEL 6 AND 7 NODE, EXCISION:  -Parathyroid gland with normal cellularity.  -No lymph node identified.  -Benign fibroadipose tissue.    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    3. Multiple myeloma. Diagnosed in 4/2021.  He was initiated on treatment with daratumumab, Velcade, Revlimid and dexamethasone.  S/p autologous PBSCT.   4. High risk for diabetes.    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 MEDICAL HISTORY  Past Medical History:   Diagnosis Date     CAD (coronary artery disease)     s/p stent to CFX     Heart attack (H)      Hyperlipidemia LDL goal <100      Hypertension      Stented coronary artery      Thyroid disease        MEDICATIONS  Current Outpatient Medications   Medication Sig  Dispense Refill     acyclovir (ZOVIRAX) 800 MG tablet Take 1 tablet (800 mg) by mouth 2 times daily 180 tablet 1     aspirin (ASA) 81 MG EC tablet Take 1 tablet (81 mg) by mouth daily       calcium carb-cholecalciferol (OS-HARDEEP) 500-200 MG-UNIT tablet Take 1 tablet by mouth daily 90 tablet 3     calcium carbonate (TUMS) 500 MG chewable tablet Take 2 tablets (1,000 mg) by mouth 3 times daily 90 tablet 1     fluticasone (FLONASE) 50 MCG/ACT nasal spray Spray 1 spray into both nostrils daily 9.9 mL 0     LENalidomide (REVLIMID) 10 MG CAPS capsule Take 1 capsule (10 mg) by mouth daily 28 capsule 0     LENalidomide (REVLIMID) 10 MG CAPS capsule Take 1 capsule (10 mg) by mouth daily 28 capsule 0     LENalidomide (REVLIMID) 10 MG CAPS capsule Take 1 capsule (10 mg) by mouth daily 28 capsule 0     levothyroxine (SYNTHROID/LEVOTHROID) 125 MCG tablet Take 1 tablet (125 mcg) by mouth daily 90 tablet 0     losartan (COZAAR) 50 MG tablet Take 1 tablet (50 mg) by mouth daily 90 tablet 3     meclizine (ANTIVERT) 25 MG tablet Take 1 tablet (25 mg) by mouth 3 times daily as needed for dizziness 30 tablet 0     potassium chloride ER (KLOR-CON M) 10 MEQ CR tablet Take 2 tablets (20 mEq) by mouth daily 60 tablet 3     rosuvastatin (CRESTOR) 40 MG tablet Take 1 tablet (40 mg) by mouth daily 90 tablet 3     sulfamethoxazole-trimethoprim (BACTRIM DS) 800-160 MG tablet Take 1 tablet by mouth 2 times daily Take on Mondays and Tuesdays (Patient not taking: Reported on 12/19/2022) 16 tablet 3     vitamin B complex with vitamin C (STRESS TAB) tablet Take 1 tablet by mouth daily 90 tablet 3       Allergies, family, and social history were reviewed and documented as needed in EHR.     REVIEW OF SYSTEMS  A focused ROS was performed, with pertinent positives and negatives as noted in the HPI.    PHYSICAL EXAM  There were no vitals taken for this visit.  There is no height or weight on file to calculate BMI.  Constitutional: Patient is alert,  oriented and appears in no acute distress.  Eyes: Eyes grossly normal to inspection, EOMI, no stare, lid lag, or retraction; no conjunctival injection.  ENMT: Lips are without lesions.   Neck: No visible goiter or neck mass.  Respiratory: No audible wheeze or cough. No visible cyanosis. No visible increased work of breathing.  Neurological: Alert and oriented times 3.  Cranial nerves grossly intact.      DATA REVIEW  Each of the following laboratory and/or imaging studies were reviewed.    Component      Latest Ref Rng & Units 12/19/2022   Free Testosterone Calculated      ng/dL 5.64   Testosterone Total      240 - 950 ng/dL 250   TSH      0.30 - 4.20 uIU/mL 0.93   Luteinizing Hormone      1.7 - 8.6 mIU/mL 7.1   FSH      1.5 - 12.4 mIU/mL 12.7 (H)   Sex Hormone Binding Globulin      11 - 80 nmol/L 25       TG panel 6/24/2022, as summarized in assessment and plan.    ASSESSMENT  1.  Papillary thyroid microcarcinoma.  Status post total thyroidectomy and left selective neck dissection.  T1a NX MX.  Review of surgical pathology indicates low risk of recurrence/persistent disease based on KRISTEN risk stratification system, therefore radioiodine therapy has been deferred to date.  I notes that PET CT of the neck on 12/5/2022 showed FDG uptake in level 2 cervical lymph nodes: I wonder if these may be reactive since detailed neck ultrasound performed on 12/29/2022 showed no suspicious appearing lymph nodes.  Additionally, thyroglobulin panel in 6/2022 showed negative thyroglobulin antibodies with thyroglobulin level of less than 0.1 ng/mL.  Would follow carefully: Anticipate detailed neck ultrasound again in 6 months, along with thyroglobulin panel.  Meanwhile, continue thyroid hormone suppression as below.    2.  Hypothyroidism.  Diagnosed in 1/2021, preceding thyroidectomy and likely due to Hashimoto's thyroiditis.  Given response to treatment (with low thyroglobulin level and no suspicious nodes on ultrasound) and given low  KRISTEN risk status, would liberalize TSH goal to between lower limit of normal and 2.  Previsit TSH is in ideal range.  Moreover, Logan overall appears to be clinically euthyroid.  Continue periodic thyroid function monitoring.    3.  Fatigue.  With intermittent diminution in libido, although now not occurring as consistently as before.  Testosterone level was at the very lower end of normal, although it was drawn in the latter part of the morning.  Would recheck testosterone in the early morning to determine if additional evaluation is needed.    4.  Multiple myeloma.  Following in oncology.  Status post chemotherapy and autologous PBSCT.    5.  High risk for diabetes.  Hemoglobin A1c 6% on 1/25/2021.  In 6/2023, hemoglobin A1c was 5.5%.  Check A1c with next lab draw.    PLAN  -8 AM labs when next able to check testosterone level  -We will check thyroid function tests in about 3 months (we will order these after testosterone is resulted)  -Continue levothyroxine 125 mcg daily (refill sent to pharmacy)  -Return for a follow-up visit in 6 months (ideally in person), with neck ultrasound and lab 2 weeks before visit--neck ultrasound to be completed at [facility] in [place] (if surveillance testing continues to show optimal response to treatment, can reduce frequency of visits to annual)  -We will communicate results via [employer], or if needed by phone      Orders Placed This Encounter   Procedures     US Head Neck Soft Tissue     Hemoglobin A1c     Luteinizing Hormone     Follicle stimulating hormone     Testosterone Free and Total     Video start time: 11:34 AM  Video end time: 11:42 AM    Physician location: Offsite    Video platform: [employer]    I spent a total of 38 minutes on the date of encounter reviewing medical records, evaluating the patient, coordinating care and documenting in the EHR, as detailed above.        Makeda Elder MD   Division of Diabetes, Endocrinology and Metabolism  Department of  Medicine

## 2023-01-12 DIAGNOSIS — C90.00 MULTIPLE MYELOMA NOT HAVING ACHIEVED REMISSION (H): ICD-10-CM

## 2023-01-12 NOTE — TELEPHONE ENCOUNTER
Pt requesting refill of potassium chloride be sent to [facility]    Thank you,  Veronica Katz, CPhT  [facility]  [phone]

## 2023-01-12 NOTE — PROGRESS NOTES
Oncologic Hx:   - 4/30/2021 M spike of 34.8 in urine.M spike in blood 0.2; IgG 702 with depressed IgA and IgM. Beta 2 microglobulin 2.7. Lambda  with K/L ratio of 0.01. WBC 11.1 with absolute eos of 1.0. hgb, plt, Cr (1.1), and albumin WNL.    -4/30/2021 CT abd/pelvis showed sclerotic marrow changes with numerous lytic appearing lesions throughout the imaged portions of the spine, pelvis and ribs.      -PET scan 5/11/2021 Numerous osteolytic lesions which predominantly demonstrate uptake below liver background levels. There is one intraosseous lesion in the left lateral 9th rib that demonstrates uptake above background, possibly due to nondisplaced fracture. Adjacent to this lesion is mild  hypermetabolism to the left pleura, with new small left pleural effusion, suspicious for malignant effusion versus posttraumatic effusion. Pleural uptake may represent extramedullary myeloma extending along the intercostal space versus inflammation.    - BM bx 5/17/2021 with flow showing 4.0% plasma cells which express CD19 (dim), CD38 (bright), CD45 (dim), , and monotypic cytoplasmic lambda immunoglobulin light chains but lack CD20 and CD56.  Hypercellular bone marrow for age (approximately 75% cellularity) with adequate trilineage hematopoiesis. Plasma cell infiltrate: Interstitial, lambda monotypic and representing approximately 60% the bone marrow cellularity, by  immunohistochemical stain. Cytpgenetics with 2 related hypodiploid clones. One with loss of Y, monosomy 13 and 14 (5%) and one with translocation of 8p and 14, derivative 16 with long arm replaced by extra copy 1q,monosomy 21. FISH showed gain of 1q, loss of 13 and 14, IGH-MYC fusion t(8:14)    - Started Miracle-RVd 21 day with velcade on days 1,8,15.     -Cycle 2 Miracle-RVd. Dose reduce dex to 80 mg d/t fatigue and stomach upset on dex days. Also having cough with basilar streaking on CXR    - 8/31/2021 BM bx -rare suspicious plasma cells in touch  "imprint. No increase in plasma cells by morph.    -8/31/2021 PET/CT Diffuse osteolytic lesions throughout the axial and appendicular skeleton. Increased sclerosis since prior exam 5/11/2021 without increased metabolism or size.  Hypermetabolic pretracheal lymph node as well as hypermetabolic level 2 lymph nodes within the right neck. These lymph nodes are likely reactive from autoimmune activation via medical therapy. Hypermetabolic subcentimeter nodules within the thyroid gland    - 11/11/2021 Autologous BMT    - 2/21/22 started maintenance with Revlimid 10 mg daily and daratumumab monthly; did Revlimid alone for C1 due to low IgG levels, added daratumumab starting C2    Interval Hx:   June presents to clinic prior to daratumumab today. He is feeling well. No new symptoms or concerns.  No recent illnesses.  No new pain. Does some domestic travel for work, drives when he can. Waiting to do international travel until after his BMT vaccines are completed.    Physical Exam:  /80   Pulse 76   Temp 98  F (36.7  C)   Resp 18   Ht 1.651 m (5' 5\")   Wt 71.7 kg (158 lb)   SpO2 98%   BMI 26.29 kg/m      Constitutional: NAD  Eyes: no scleral icterus  ENT: no oral lesions  Lymph: no cervical, supraclavicular, axillary LAD  Pulm: CTAB  CV: RRR, no m/r/g  GI: bowel sounds present, soft and nontender to palpation  MSK: No edema in the extremities  Neuro: alert, conversant, normal gait  Psych: appropriate mood and affect    Labs:  I personally reviewed the following labs:    Most Recent 3 CBC's:  Recent Labs   Lab Test 01/16/23  1524 12/19/22  1105 12/05/22  1144   WBC 4.8 3.8* 4.6   HGB 11.2* 11.0* 11.3*   MCV 92 92 91    299 338     Most Recent 3 BMP's:  Recent Labs   Lab Test 01/16/23  1524 12/19/22  1105 12/05/22  1144    137 137   POTASSIUM 3.7 4.0 3.9   CHLORIDE 104 104 104   CO2 27 22 22   BUN 13.7 11.9 10.8   CR 1.17 1.08 1.33*   ANIONGAP 8 11 11   HARDEEP 9.2 9.3 8.5*   * 103* 99 "     Most Recent 2 LFT's:  Recent Labs   Lab Test 01/16/23  1524 12/19/22  1105   AST 20 21   ALT 13 13   ALKPHOS 54 53   BILITOTAL 0.3 0.4       Assessment and Plan  Logan has multiple myeloma with high risk cytogenetics. He got Vidya-RVD with VGPR, then underwent autologous transplant 11/11/21. Continue acyclovir daily for viral ppx and bactrim M/T for PJP ppx. Given his high risk cytogenetics he started vidya + revlimid maintenance therapy 2/21/22. Follow IgG and replete if it remains <300. He had severe flu-like effects from his first dose of Zometa but he has been tolerating monthly Zometa dosed at 3 mg. Continue Vit D. Continue B vitamin supplement to help his neuropathy.   - will proceed with vidya + revlimid today, labs stable. Continue zometa    -Evusheld given 10/11/22  - Will get influenza vaccine today  - Due for next set of BMT vaccines when he returns in February    Elevated serum creatinine  Creatinine has been slightly elevated above baseline in the past.  Stable at 1.17 today.  Will monitor in case revlimid needs to be dose reduced and he needs nephrology consult.    Hypogammaglobulinemia Start monthly IV Ig whenever IgG <300    Normocytic Anemia: likely d/t revlimid and daratumumab. Will keep monitoring and dose reduce if needed.      30 minutes spent on the date of the encounter doing chart review, review of test results, patient visit and documentation     PATTY Meza CNP  [facility]  [address] [place] [address]  [phone]

## 2023-01-16 ENCOUNTER — APPOINTMENT (OUTPATIENT)
Dept: LAB | Facility: CLINIC | Age: 53
End: 2023-01-16
Attending: STUDENT IN AN ORGANIZED HEALTH CARE EDUCATION/TRAINING PROGRAM
Payer: COMMERCIAL

## 2023-01-16 ENCOUNTER — ONCOLOGY VISIT (OUTPATIENT)
Dept: ONCOLOGY | Facility: CLINIC | Age: 53
End: 2023-01-16
Attending: STUDENT IN AN ORGANIZED HEALTH CARE EDUCATION/TRAINING PROGRAM
Payer: COMMERCIAL

## 2023-01-16 VITALS
TEMPERATURE: 98 F | HEIGHT: 65 IN | SYSTOLIC BLOOD PRESSURE: 133 MMHG | HEART RATE: 76 BPM | BODY MASS INDEX: 26.33 KG/M2 | DIASTOLIC BLOOD PRESSURE: 80 MMHG | OXYGEN SATURATION: 98 % | RESPIRATION RATE: 18 BRPM | WEIGHT: 158 LBS

## 2023-01-16 DIAGNOSIS — R73.03 PRE-DIABETES: ICD-10-CM

## 2023-01-16 DIAGNOSIS — R79.89 ELEVATED SERUM CREATININE: ICD-10-CM

## 2023-01-16 DIAGNOSIS — R53.83 FATIGUE, UNSPECIFIED TYPE: ICD-10-CM

## 2023-01-16 DIAGNOSIS — C90.00 MULTIPLE MYELOMA NOT HAVING ACHIEVED REMISSION (H): Primary | ICD-10-CM

## 2023-01-16 LAB
BASOPHILS # BLD AUTO: 0 10E3/UL (ref 0–0.2)
BASOPHILS NFR BLD AUTO: 1 %
EOSINOPHIL # BLD AUTO: 0.3 10E3/UL (ref 0–0.7)
EOSINOPHIL NFR BLD AUTO: 7 %
ERYTHROCYTE [DISTWIDTH] IN BLOOD BY AUTOMATED COUNT: 16 % (ref 10–15)
FSH SERPL IRP2-ACNC: 13.6 MIU/ML (ref 1.5–12.4)
HBA1C MFR BLD: 6.2 %
HCT VFR BLD AUTO: 33.9 % (ref 40–53)
HGB BLD-MCNC: 11.2 G/DL (ref 13.3–17.7)
IMM GRANULOCYTES # BLD: 0 10E3/UL
IMM GRANULOCYTES NFR BLD: 0 %
LH SERPL-ACNC: 11.1 MIU/ML (ref 1.7–8.6)
LYMPHOCYTES # BLD AUTO: 1.6 10E3/UL (ref 0.8–5.3)
LYMPHOCYTES NFR BLD AUTO: 34 %
MCH RBC QN AUTO: 30.3 PG (ref 26.5–33)
MCHC RBC AUTO-ENTMCNC: 33 G/DL (ref 31.5–36.5)
MCV RBC AUTO: 92 FL (ref 78–100)
MONOCYTES # BLD AUTO: 0.7 10E3/UL (ref 0–1.3)
MONOCYTES NFR BLD AUTO: 15 %
NEUTROPHILS # BLD AUTO: 2.1 10E3/UL (ref 1.6–8.3)
NEUTROPHILS NFR BLD AUTO: 43 %
NRBC # BLD AUTO: 0 10E3/UL
NRBC BLD AUTO-RTO: 0 /100
PLATELET # BLD AUTO: 295 10E3/UL (ref 150–450)
RBC # BLD AUTO: 3.7 10E6/UL (ref 4.4–5.9)
WBC # BLD AUTO: 4.8 10E3/UL (ref 4–11)

## 2023-01-16 PROCEDURE — G0463 HOSPITAL OUTPT CLINIC VISIT: HCPCS | Mod: 25 | Performed by: REGISTERED NURSE

## 2023-01-16 PROCEDURE — 258N000003 HC RX IP 258 OP 636: Performed by: STUDENT IN AN ORGANIZED HEALTH CARE EDUCATION/TRAINING PROGRAM

## 2023-01-16 PROCEDURE — G0008 ADMIN INFLUENZA VIRUS VAC: HCPCS | Performed by: REGISTERED NURSE

## 2023-01-16 PROCEDURE — 96401 CHEMO ANTI-NEOPL SQ/IM: CPT

## 2023-01-16 PROCEDURE — G0463 HOSPITAL OUTPT CLINIC VISIT: HCPCS

## 2023-01-16 PROCEDURE — 83001 ASSAY OF GONADOTROPIN (FSH): CPT

## 2023-01-16 PROCEDURE — 250N000011 HC RX IP 250 OP 636: Performed by: REGISTERED NURSE

## 2023-01-16 PROCEDURE — 99214 OFFICE O/P EST MOD 30 MIN: CPT | Performed by: REGISTERED NURSE

## 2023-01-16 PROCEDURE — 83036 HEMOGLOBIN GLYCOSYLATED A1C: CPT | Performed by: REGISTERED NURSE

## 2023-01-16 PROCEDURE — G0463 HOSPITAL OUTPT CLINIC VISIT: HCPCS | Mod: 25

## 2023-01-16 PROCEDURE — 84403 ASSAY OF TOTAL TESTOSTERONE: CPT | Performed by: REGISTERED NURSE

## 2023-01-16 PROCEDURE — 84270 ASSAY OF SEX HORMONE GLOBUL: CPT | Performed by: REGISTERED NURSE

## 2023-01-16 PROCEDURE — 96365 THER/PROPH/DIAG IV INF INIT: CPT

## 2023-01-16 PROCEDURE — 250N000011 HC RX IP 250 OP 636: Mod: JW | Performed by: STUDENT IN AN ORGANIZED HEALTH CARE EDUCATION/TRAINING PROGRAM

## 2023-01-16 PROCEDURE — 90682 RIV4 VACC RECOMBINANT DNA IM: CPT | Performed by: REGISTERED NURSE

## 2023-01-16 PROCEDURE — 85025 COMPLETE CBC W/AUTO DIFF WBC: CPT

## 2023-01-16 PROCEDURE — 83002 ASSAY OF GONADOTROPIN (LH): CPT

## 2023-01-16 RX ORDER — POTASSIUM CHLORIDE 750 MG/1
20 TABLET, EXTENDED RELEASE ORAL DAILY
Qty: 60 TABLET | Refills: 3 | Status: SHIPPED | OUTPATIENT
Start: 2023-01-16 | End: 2023-08-28

## 2023-01-16 RX ADMIN — INFLUENZA A VIRUS A/WISCONSIN/588/2019 (H1N1) RECOMBINANT HEMAGGLUTININ ANTIGEN, INFLUENZA A VIRUS A/DARWIN/6/2021 (H3N2) RECOMBINANT HEMAGGLUTININ ANTIGEN, INFLUENZA B VIRUS B/AUSTRIA/1359417/2021 RECOMBINANT HEMAGGLUTININ ANTIGEN, AND INFLUENZA B VIRUS B/PHUKET/3073/2013 RECOMBINANT HEMAGGLUTININ ANTIGEN 0.5 ML: 45; 45; 45; 45 INJECTION INTRAMUSCULAR at 17:02

## 2023-01-16 RX ADMIN — ZOLEDRONIC ACID 3 MG: 4 INJECTION, SOLUTION, CONCENTRATE INTRAVENOUS at 16:48

## 2023-01-16 RX ADMIN — SODIUM CHLORIDE 250 ML: 9 INJECTION, SOLUTION INTRAVENOUS at 16:49

## 2023-01-16 RX ADMIN — DARATUMUMAB AND HYALURONIDASE-FIHJ (HUMAN RECOMBINANT) 1800 MG: 1800; 30000 INJECTION SUBCUTANEOUS at 16:56

## 2023-01-16 ASSESSMENT — PAIN SCALES - GENERAL: PAINLEVEL: NO PAIN (0)

## 2023-01-16 NOTE — LETTER
1/16/2023         RE: June Ronquillo  [address]  [facility] [address]        Dear Colleague,    Thank you for referring your patient, June Ronquillo, to the [facility]. Please see a copy of my visit note below.    Oncologic Hx:   - 4/30/2021 M spike of 34.8 in urine.M spike in blood 0.2; IgG 702 with depressed IgA and IgM. Beta 2 microglobulin 2.7. Lambda  with K/L ratio of 0.01. WBC 11.1 with absolute eos of 1.0. hgb, plt, Cr (1.1), and albumin WNL.    -4/30/2021 CT abd/pelvis showed sclerotic marrow changes with numerous lytic appearing lesions throughout the imaged portions of the spine, pelvis and ribs.      -PET scan 5/11/2021 Numerous osteolytic lesions which predominantly demonstrate uptake below liver background levels. There is one intraosseous lesion in the left lateral 9th rib that demonstrates uptake above background, possibly due to nondisplaced fracture. Adjacent to this lesion is mild  hypermetabolism to the left pleura, with new small left pleural effusion, suspicious for malignant effusion versus posttraumatic effusion. Pleural uptake may represent extramedullary myeloma extending along the intercostal space versus inflammation.    - BM bx 5/17/2021 with flow showing 4.0% plasma cells which express CD19 (dim), CD38 (bright), CD45 (dim), , and monotypic cytoplasmic lambda immunoglobulin light chains but lack CD20 and CD56.  Hypercellular bone marrow for age (approximately 75% cellularity) with adequate trilineage hematopoiesis. Plasma cell infiltrate: Interstitial, lambda monotypic and representing approximately 60% the bone marrow cellularity, by  immunohistochemical stain. Cytpgenetics with 2 related hypodiploid clones. One with loss of Y, monosomy 13 and 14 (5%) and one with translocation of 8p and 14, derivative 16 with long arm replaced by extra copy 1q,monosomy 21. FISH showed gain of 1q, loss of 13 and 14, IGH-MYC fusion  "t(8:14)    - Started Miracle-RVd 21 day with velcade on days 1,8,15.     -Cycle 2 Miracle-RVd. Dose reduce dex to 80 mg d/t fatigue and stomach upset on dex days. Also having cough with basilar streaking on CXR    - 8/31/2021 BM bx -rare suspicious plasma cells in touch imprint. No increase in plasma cells by morph.    -8/31/2021 PET/CT Diffuse osteolytic lesions throughout the axial and appendicular skeleton. Increased sclerosis since prior exam 5/11/2021 without increased metabolism or size.  Hypermetabolic pretracheal lymph node as well as hypermetabolic level 2 lymph nodes within the right neck. These lymph nodes are likely reactive from autoimmune activation via medical therapy. Hypermetabolic subcentimeter nodules within the thyroid gland    - 11/11/2021 Autologous BMT    - 2/21/22 started maintenance with Revlimid 10 mg daily and daratumumab monthly; did Revlimid alone for C1 due to low IgG levels, added daratumumab starting C2    Interval Hx:   June presents to clinic prior to daratumumab today. He is feeling well. No new symptoms or concerns.  No recent illnesses.  No new pain. Does some domestic travel for work, drives when he can. Waiting to do international travel until after his BMT vaccines are completed.    Physical Exam:  /80   Pulse 76   Temp 98  F (36.7  C)   Resp 18   Ht 1.651 m (5' 5\")   Wt 71.7 kg (158 lb)   SpO2 98%   BMI 26.29 kg/m      Constitutional: NAD  Eyes: no scleral icterus  ENT: no oral lesions  Lymph: no cervical, supraclavicular, axillary LAD  Pulm: CTAB  CV: RRR, no m/r/g  GI: bowel sounds present, soft and nontender to palpation  MSK: No edema in the extremities  Neuro: alert, conversant, normal gait  Psych: appropriate mood and affect    Labs:  I personally reviewed the following labs:    Most Recent 3 CBC's:  Recent Labs   Lab Test 01/16/23  1524 12/19/22  1105 12/05/22  1144   WBC 4.8 3.8* 4.6   HGB 11.2* 11.0* 11.3*   MCV 92 92 91    299 338     Most " Recent 3 BMP's:  Recent Labs   Lab Test 01/16/23  1524 12/19/22  1105 12/05/22  1144    137 137   POTASSIUM 3.7 4.0 3.9   CHLORIDE 104 104 104   CO2 27 22 22   BUN 13.7 11.9 10.8   CR 1.17 1.08 1.33*   ANIONGAP 8 11 11   HARDEEP 9.2 9.3 8.5*   * 103* 99     Most Recent 2 LFT's:  Recent Labs   Lab Test 01/16/23  1524 12/19/22  1105   AST 20 21   ALT 13 13   ALKPHOS 54 53   BILITOTAL 0.3 0.4       Assessment and Plan  Logan has multiple myeloma with high risk cytogenetics. He got Miracle-RVD with VGPR, then underwent autologous transplant 11/11/21. Continue acyclovir daily for viral ppx and bactrim M/T for PJP ppx. Given his high risk cytogenetics he started miracle + revlimid maintenance therapy 2/21/22. Follow IgG and replete if it remains <300. He had severe flu-like effects from his first dose of Zometa but he has been tolerating monthly Zometa dosed at 3 mg. Continue Vit D. Continue B vitamin supplement to help his neuropathy.   - will proceed with miracle + revlimid today, labs stable. Continue zometa    -Evusheld given 10/11/22  - Will get influenza vaccine today  - Due for next set of BMT vaccines when he returns in February    Elevated serum creatinine  Creatinine has been slightly elevated above baseline in the past.  Stable at 1.17 today.  Will monitor in case revlimid needs to be dose reduced and he needs nephrology consult.    Hypogammaglobulinemia Start monthly IV Ig whenever IgG <300    Normocytic Anemia: likely d/t revlimid and daratumumab. Will keep monitoring and dose reduce if needed.      30 minutes spent on the date of the encounter doing chart review, review of test results, patient visit and documentation     PATTY Meza CNP  [facility]  [address] [place] [address]  [phone]

## 2023-01-16 NOTE — TELEPHONE ENCOUNTER
Potassium Chloride ER 10m mEq tablet  Last prescribing provider: Barbi Vazquez    Last clinic visit date: 10/11/22    Recommendations for requested medication (if none, N/A): take 2 tablets (20 meq) PO daily     Any other pertinent information (if none, N/A): NA    Refilled: Y/N, if NO, why?    Routed to Laura Ballesteros, as Dr. Vazquez is out of office.

## 2023-01-16 NOTE — NURSING NOTE
"Oncology Rooming Note    January 16, 2023 3:51 PM   June Ronquillo is a 52 year old male who presents for:    Chief Complaint   Patient presents with     Blood Draw     Labs drawn by RN via PIV, vitals taken.     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    Weight as of this encounter: 71.7 kg (158 lb). Body surface area is 1.81 meters squared.  No Pain (0) Comment: Data Unavailable   No LMP for male patient.  Allergies reviewed: Yes  Medications reviewed: Yes    Medications: Medication refills not needed today.  Pharmacy name entered into [facility]:    [facility] - [place], [place] - [address]  [facility] - [place], [place] - [address] [place] [address] AVE [facility] [place], [place] - [address] [place] [address] [phone]    Juan Saenz LPN            "

## 2023-01-16 NOTE — PROGRESS NOTES
Infusion Nursing Note:  June Ronquillo presents today for C13D1 Zometa and Darzalex faspro.    Patient seen by provider today: Yes: Laura Ballesteros NP   present during visit today: Not Applicable.    Note: June presents to infusion today feeling well following his clinic appointment. He denies any new concerns. Influenza vaccine given per patient & provider request.    Intravenous Access:  Peripheral IV placed.    Treatment Conditions:  Lab Results   Component Value Date    HGB 11.2 (L) 01/16/2023    WBC 4.8 01/16/2023    ANEU 3.0 07/12/2022    ANEUTAUTO 2.1 01/16/2023     01/16/2023      Lab Results   Component Value Date     01/16/2023    POTASSIUM 3.7 01/16/2023    MAG 1.9 11/10/2022    CR 1.17 01/16/2023    HARDEEP 9.2 01/16/2023    BILITOTAL 0.3 01/16/2023    ALBUMIN 4.3 01/16/2023    ALT 13 01/16/2023    AST 20 01/16/2023     Results reviewed, labs MET treatment parameters, ok to proceed with treatment.    Post Infusion Assessment:  Patient tolerated infusion without incident.  Patient tolerated darzalex injection to LLQ abdomen without incident.  Patient tolerated influenza vaccine to L deltoid without incident.  Patient observed for 15 minutes following influenza vaccine.  Blood return noted pre and post infusion.  Site patent and intact, free from redness, edema or discomfort.  No evidence of extravasations.  Access discontinued per protocol.     Discharge Plan:   Patient declined prescription refills.  Discharge instructions reviewed with: Patient.  Patient and/or family verbalized understanding of discharge instructions and all questions answered.  AVS to patient via [employer].  Patient will return 2/13 for next appointment.   Patient discharged in stable condition accompanied by: self.  Departure Mode: Ambulatory.      Tracy Lopez RN

## 2023-01-16 NOTE — NURSING NOTE
Chief Complaint   Patient presents with     Blood Draw     Labs drawn by RN via PIV, vitals taken.     Labs drawn from PIV placed by RN. Line flushed with saline. Vitals taken. Pt checked in for appointment(s).    Niurka Leon RN

## 2023-01-17 LAB — SHBG SERPL-SCNC: 27 NMOL/L (ref 11–80)

## 2023-01-18 ENCOUNTER — LAB (OUTPATIENT)
Dept: LAB | Facility: CLINIC | Age: 53
End: 2023-01-18
Payer: COMMERCIAL

## 2023-01-18 DIAGNOSIS — C90.00 MULTIPLE MYELOMA NOT HAVING ACHIEVED REMISSION (H): ICD-10-CM

## 2023-01-18 LAB
ALBUMIN SERPL BCG-MCNC: 4.4 G/DL (ref 3.5–5.2)
ALP SERPL-CCNC: 53 U/L (ref 40–129)
ALT SERPL W P-5'-P-CCNC: 12 U/L (ref 10–50)
ANION GAP SERPL CALCULATED.3IONS-SCNC: 7 MMOL/L (ref 7–15)
AST SERPL W P-5'-P-CCNC: 22 U/L (ref 10–50)
BASOPHILS # BLD AUTO: 0 10E3/UL (ref 0–0.2)
BASOPHILS NFR BLD AUTO: 0 %
BILIRUB SERPL-MCNC: 0.3 MG/DL
BUN SERPL-MCNC: 15.3 MG/DL (ref 6–20)
CALCIUM SERPL-MCNC: 9.1 MG/DL (ref 8.6–10)
CHLORIDE SERPL-SCNC: 107 MMOL/L (ref 98–107)
CREAT SERPL-MCNC: 1.26 MG/DL (ref 0.67–1.17)
DEPRECATED HCO3 PLAS-SCNC: 26 MMOL/L (ref 22–29)
EOSINOPHIL # BLD AUTO: 0.5 10E3/UL (ref 0–0.7)
EOSINOPHIL NFR BLD AUTO: 6 %
ERYTHROCYTE [DISTWIDTH] IN BLOOD BY AUTOMATED COUNT: 16 % (ref 10–15)
GFR SERPL CREATININE-BSD FRML MDRD: 68 ML/MIN/1.73M2
GLUCOSE SERPL-MCNC: 115 MG/DL (ref 70–99)
HCT VFR BLD AUTO: 35.5 % (ref 40–53)
HGB BLD-MCNC: 11.4 G/DL (ref 13.3–17.7)
IMM GRANULOCYTES # BLD: 0 10E3/UL
IMM GRANULOCYTES NFR BLD: 0 %
LYMPHOCYTES # BLD AUTO: 1.9 10E3/UL (ref 0.8–5.3)
LYMPHOCYTES NFR BLD AUTO: 25 %
MCH RBC QN AUTO: 30 PG (ref 26.5–33)
MCHC RBC AUTO-ENTMCNC: 32.1 G/DL (ref 31.5–36.5)
MCV RBC AUTO: 93 FL (ref 78–100)
MONOCYTES # BLD AUTO: 0.9 10E3/UL (ref 0–1.3)
MONOCYTES NFR BLD AUTO: 11 %
NEUTROPHILS # BLD AUTO: 4.4 10E3/UL (ref 1.6–8.3)
NEUTROPHILS NFR BLD AUTO: 58 %
NRBC # BLD AUTO: 0 10E3/UL
NRBC BLD AUTO-RTO: 0 /100
PLATELET # BLD AUTO: 295 10E3/UL (ref 150–450)
POTASSIUM SERPL-SCNC: 4.2 MMOL/L (ref 3.4–5.3)
PROT SERPL-MCNC: 6.3 G/DL (ref 6.4–8.3)
RBC # BLD AUTO: 3.8 10E6/UL (ref 4.4–5.9)
SODIUM SERPL-SCNC: 140 MMOL/L (ref 136–145)
WBC # BLD AUTO: 7.7 10E3/UL (ref 4–11)

## 2023-01-18 PROCEDURE — 80053 COMPREHEN METABOLIC PANEL: CPT | Performed by: PATHOLOGY

## 2023-01-18 PROCEDURE — 85025 COMPLETE CBC W/AUTO DIFF WBC: CPT | Performed by: PATHOLOGY

## 2023-01-18 PROCEDURE — 36415 COLL VENOUS BLD VENIPUNCTURE: CPT | Performed by: PATHOLOGY

## 2023-01-20 DIAGNOSIS — C90.00 MULTIPLE MYELOMA NOT HAVING ACHIEVED REMISSION (H): Primary | ICD-10-CM

## 2023-01-20 RX ORDER — NALOXONE HYDROCHLORIDE 0.4 MG/ML
0.2 INJECTION, SOLUTION INTRAMUSCULAR; INTRAVENOUS; SUBCUTANEOUS
Status: CANCELLED | OUTPATIENT
Start: 2023-02-20

## 2023-01-20 RX ORDER — METHYLPREDNISOLONE SODIUM SUCCINATE 125 MG/2ML
125 INJECTION, POWDER, LYOPHILIZED, FOR SOLUTION INTRAMUSCULAR; INTRAVENOUS
Status: CANCELLED
Start: 2023-02-20

## 2023-01-20 RX ORDER — DEXAMETHASONE 4 MG/1
12 TABLET ORAL ONCE
Status: CANCELLED
Start: 2023-04-17 | End: 2023-04-17

## 2023-01-20 RX ORDER — EPINEPHRINE 1 MG/ML
0.3 INJECTION, SOLUTION INTRAMUSCULAR; SUBCUTANEOUS EVERY 5 MIN PRN
Status: CANCELLED | OUTPATIENT
Start: 2023-04-17

## 2023-01-20 RX ORDER — ALBUTEROL SULFATE 0.83 MG/ML
2.5 SOLUTION RESPIRATORY (INHALATION)
Status: CANCELLED | OUTPATIENT
Start: 2023-03-20

## 2023-01-20 RX ORDER — HEPARIN SODIUM (PORCINE) LOCK FLUSH IV SOLN 100 UNIT/ML 100 UNIT/ML
5 SOLUTION INTRAVENOUS
Status: CANCELLED | OUTPATIENT
Start: 2023-02-20

## 2023-01-20 RX ORDER — MEPERIDINE HYDROCHLORIDE 25 MG/ML
25 INJECTION INTRAMUSCULAR; INTRAVENOUS; SUBCUTANEOUS EVERY 30 MIN PRN
Status: CANCELLED | OUTPATIENT
Start: 2023-04-17

## 2023-01-20 RX ORDER — DIPHENHYDRAMINE HYDROCHLORIDE 50 MG/ML
50 INJECTION INTRAMUSCULAR; INTRAVENOUS
Status: CANCELLED
Start: 2023-04-17

## 2023-01-20 RX ORDER — EPINEPHRINE 1 MG/ML
0.3 INJECTION, SOLUTION INTRAMUSCULAR; SUBCUTANEOUS EVERY 5 MIN PRN
Status: CANCELLED | OUTPATIENT
Start: 2023-02-20

## 2023-01-20 RX ORDER — MEPERIDINE HYDROCHLORIDE 25 MG/ML
25 INJECTION INTRAMUSCULAR; INTRAVENOUS; SUBCUTANEOUS EVERY 30 MIN PRN
Status: CANCELLED | OUTPATIENT
Start: 2023-03-20

## 2023-01-20 RX ORDER — NALOXONE HYDROCHLORIDE 0.4 MG/ML
0.2 INJECTION, SOLUTION INTRAMUSCULAR; INTRAVENOUS; SUBCUTANEOUS
Status: CANCELLED | OUTPATIENT
Start: 2023-04-17

## 2023-01-20 RX ORDER — ALBUTEROL SULFATE 0.83 MG/ML
2.5 SOLUTION RESPIRATORY (INHALATION)
Status: CANCELLED | OUTPATIENT
Start: 2023-02-20

## 2023-01-20 RX ORDER — DIPHENHYDRAMINE HCL 25 MG
50 CAPSULE ORAL ONCE
Status: CANCELLED
Start: 2023-02-20 | End: 2023-02-20

## 2023-01-20 RX ORDER — DIPHENHYDRAMINE HCL 25 MG
50 CAPSULE ORAL ONCE
Status: CANCELLED
Start: 2023-03-20 | End: 2023-03-20

## 2023-01-20 RX ORDER — DIPHENHYDRAMINE HCL 25 MG
50 CAPSULE ORAL ONCE
Status: CANCELLED
Start: 2023-04-17 | End: 2023-04-17

## 2023-01-20 RX ORDER — ALBUTEROL SULFATE 90 UG/1
1-2 AEROSOL, METERED RESPIRATORY (INHALATION)
Status: CANCELLED
Start: 2023-03-20

## 2023-01-20 RX ORDER — NALOXONE HYDROCHLORIDE 0.4 MG/ML
0.2 INJECTION, SOLUTION INTRAMUSCULAR; INTRAVENOUS; SUBCUTANEOUS
Status: CANCELLED | OUTPATIENT
Start: 2023-03-20

## 2023-01-20 RX ORDER — ACETAMINOPHEN 325 MG/1
650 TABLET ORAL ONCE
Status: CANCELLED
Start: 2023-02-20 | End: 2023-02-20

## 2023-01-20 RX ORDER — EPINEPHRINE 1 MG/ML
0.3 INJECTION, SOLUTION INTRAMUSCULAR; SUBCUTANEOUS EVERY 5 MIN PRN
Status: CANCELLED | OUTPATIENT
Start: 2023-03-20

## 2023-01-20 RX ORDER — HEPARIN SODIUM (PORCINE) LOCK FLUSH IV SOLN 100 UNIT/ML 100 UNIT/ML
5 SOLUTION INTRAVENOUS
Status: CANCELLED | OUTPATIENT
Start: 2023-04-17

## 2023-01-20 RX ORDER — DIPHENHYDRAMINE HYDROCHLORIDE 50 MG/ML
50 INJECTION INTRAMUSCULAR; INTRAVENOUS
Status: CANCELLED
Start: 2023-03-20

## 2023-01-20 RX ORDER — DEXAMETHASONE 4 MG/1
12 TABLET ORAL ONCE
Status: CANCELLED
Start: 2023-03-20 | End: 2023-03-20

## 2023-01-20 RX ORDER — METHYLPREDNISOLONE SODIUM SUCCINATE 125 MG/2ML
125 INJECTION, POWDER, LYOPHILIZED, FOR SOLUTION INTRAMUSCULAR; INTRAVENOUS
Status: CANCELLED
Start: 2023-04-17

## 2023-01-20 RX ORDER — DEXAMETHASONE 4 MG/1
12 TABLET ORAL ONCE
Status: CANCELLED
Start: 2023-02-20 | End: 2023-02-20

## 2023-01-20 RX ORDER — HEPARIN SODIUM,PORCINE 10 UNIT/ML
5 VIAL (ML) INTRAVENOUS
Status: CANCELLED | OUTPATIENT
Start: 2023-04-17

## 2023-01-20 RX ORDER — METHYLPREDNISOLONE SODIUM SUCCINATE 125 MG/2ML
125 INJECTION, POWDER, LYOPHILIZED, FOR SOLUTION INTRAMUSCULAR; INTRAVENOUS
Status: CANCELLED
Start: 2023-03-20

## 2023-01-20 RX ORDER — HEPARIN SODIUM,PORCINE 10 UNIT/ML
5 VIAL (ML) INTRAVENOUS
Status: CANCELLED | OUTPATIENT
Start: 2023-03-20

## 2023-01-20 RX ORDER — ALBUTEROL SULFATE 0.83 MG/ML
2.5 SOLUTION RESPIRATORY (INHALATION)
Status: CANCELLED | OUTPATIENT
Start: 2023-04-17

## 2023-01-20 RX ORDER — ACETAMINOPHEN 325 MG/1
650 TABLET ORAL ONCE
Status: CANCELLED
Start: 2023-03-20 | End: 2023-03-20

## 2023-01-20 RX ORDER — MEPERIDINE HYDROCHLORIDE 25 MG/ML
25 INJECTION INTRAMUSCULAR; INTRAVENOUS; SUBCUTANEOUS EVERY 30 MIN PRN
Status: CANCELLED | OUTPATIENT
Start: 2023-02-20

## 2023-01-20 RX ORDER — ALBUTEROL SULFATE 90 UG/1
1-2 AEROSOL, METERED RESPIRATORY (INHALATION)
Status: CANCELLED
Start: 2023-02-20

## 2023-01-20 RX ORDER — HEPARIN SODIUM,PORCINE 10 UNIT/ML
5 VIAL (ML) INTRAVENOUS
Status: CANCELLED | OUTPATIENT
Start: 2023-02-20

## 2023-01-20 RX ORDER — ALBUTEROL SULFATE 90 UG/1
1-2 AEROSOL, METERED RESPIRATORY (INHALATION)
Status: CANCELLED
Start: 2023-04-17

## 2023-01-20 RX ORDER — DIPHENHYDRAMINE HYDROCHLORIDE 50 MG/ML
50 INJECTION INTRAMUSCULAR; INTRAVENOUS
Status: CANCELLED
Start: 2023-02-20

## 2023-01-20 RX ORDER — ACETAMINOPHEN 325 MG/1
650 TABLET ORAL ONCE
Status: CANCELLED
Start: 2023-04-17 | End: 2023-04-17

## 2023-01-20 RX ORDER — HEPARIN SODIUM (PORCINE) LOCK FLUSH IV SOLN 100 UNIT/ML 100 UNIT/ML
5 SOLUTION INTRAVENOUS
Status: CANCELLED | OUTPATIENT
Start: 2023-03-20

## 2023-01-22 LAB
TESTOST FREE SERPL-MCNC: 6.76 NG/DL
TESTOST SERPL-MCNC: 306 NG/DL (ref 240–950)

## 2023-01-26 DIAGNOSIS — C90.00 MULTIPLE MYELOMA NOT HAVING ACHIEVED REMISSION (H): Primary | ICD-10-CM

## 2023-01-26 RX ORDER — LENALIDOMIDE 10 MG/1
10 CAPSULE ORAL DAILY
Qty: 28 CAPSULE | Refills: 0 | Status: SHIPPED | OUTPATIENT
Start: 2023-01-26 | End: 2023-02-13

## 2023-02-02 ENCOUNTER — TELEPHONE (OUTPATIENT)
Dept: ONCOLOGY | Facility: CLINIC | Age: 53
End: 2023-02-02
Payer: COMMERCIAL

## 2023-02-02 NOTE — TELEPHONE ENCOUNTER
Oral Chemotherapy Monitoring Program   Medication: Revlimid  Rx: 10mg PO daily on days 1 through 28 of 28 day cycle   Auth #: [id number]   Risk Category: Adult Male  Routine survey questions reviewed.   Rx to be Escribed to [facility]    Yu Goldberg  [facility]  Oncology Pharmacy Liaison   [email or web address]  [phone] (phone)  [phone] (fax)

## 2023-02-11 DIAGNOSIS — R94.2 ABNORMAL PFT: Primary | ICD-10-CM

## 2023-02-13 ENCOUNTER — INFUSION THERAPY VISIT (OUTPATIENT)
Dept: ONCOLOGY | Facility: CLINIC | Age: 53
End: 2023-02-13
Attending: STUDENT IN AN ORGANIZED HEALTH CARE EDUCATION/TRAINING PROGRAM
Payer: COMMERCIAL

## 2023-02-13 ENCOUNTER — APPOINTMENT (OUTPATIENT)
Dept: LAB | Facility: CLINIC | Age: 53
End: 2023-02-13
Attending: STUDENT IN AN ORGANIZED HEALTH CARE EDUCATION/TRAINING PROGRAM
Payer: COMMERCIAL

## 2023-02-13 ENCOUNTER — ALLIED HEALTH/NURSE VISIT (OUTPATIENT)
Dept: TRANSPLANT | Facility: CLINIC | Age: 53
End: 2023-02-13
Attending: STUDENT IN AN ORGANIZED HEALTH CARE EDUCATION/TRAINING PROGRAM
Payer: COMMERCIAL

## 2023-02-13 VITALS
HEART RATE: 74 BPM | BODY MASS INDEX: 26.29 KG/M2 | TEMPERATURE: 98 F | DIASTOLIC BLOOD PRESSURE: 85 MMHG | WEIGHT: 158 LBS | RESPIRATION RATE: 16 BRPM | SYSTOLIC BLOOD PRESSURE: 139 MMHG | OXYGEN SATURATION: 98 %

## 2023-02-13 VITALS
TEMPERATURE: 98 F | WEIGHT: 158.07 LBS | DIASTOLIC BLOOD PRESSURE: 85 MMHG | BODY MASS INDEX: 26.3 KG/M2 | RESPIRATION RATE: 16 BRPM | OXYGEN SATURATION: 98 % | HEART RATE: 74 BPM | SYSTOLIC BLOOD PRESSURE: 139 MMHG

## 2023-02-13 DIAGNOSIS — R09.81 SINUS CONGESTION: ICD-10-CM

## 2023-02-13 DIAGNOSIS — C90.00 MULTIPLE MYELOMA NOT HAVING ACHIEVED REMISSION (H): Primary | ICD-10-CM

## 2023-02-13 DIAGNOSIS — R79.89 ELEVATED SERUM CREATININE: ICD-10-CM

## 2023-02-13 DIAGNOSIS — D80.1 HYPOGAMMAGLOBULINEMIA (H): ICD-10-CM

## 2023-02-13 DIAGNOSIS — C90.00 MULTIPLE MYELOMA NOT HAVING ACHIEVED REMISSION (H): ICD-10-CM

## 2023-02-13 DIAGNOSIS — Z52.011 AUTOLOGOUS DONOR, STEM CELLS: Primary | ICD-10-CM

## 2023-02-13 LAB
ALBUMIN SERPL BCG-MCNC: 4.2 G/DL (ref 3.5–5.2)
ALP SERPL-CCNC: 50 U/L (ref 40–129)
ALT SERPL W P-5'-P-CCNC: 12 U/L (ref 10–50)
ANION GAP SERPL CALCULATED.3IONS-SCNC: 9 MMOL/L (ref 7–15)
AST SERPL W P-5'-P-CCNC: 22 U/L (ref 10–50)
BASOPHILS # BLD AUTO: 0.1 10E3/UL (ref 0–0.2)
BASOPHILS NFR BLD AUTO: 1 %
BILIRUB SERPL-MCNC: 0.4 MG/DL
BUN SERPL-MCNC: 11.5 MG/DL (ref 6–20)
C PNEUM DNA SPEC QL NAA+PROBE: NOT DETECTED
CALCIUM SERPL-MCNC: 8.6 MG/DL (ref 8.6–10)
CHLORIDE SERPL-SCNC: 107 MMOL/L (ref 98–107)
CREAT SERPL-MCNC: 1.32 MG/DL (ref 0.67–1.17)
DEPRECATED HCO3 PLAS-SCNC: 23 MMOL/L (ref 22–29)
EOSINOPHIL # BLD AUTO: 0.3 10E3/UL (ref 0–0.7)
EOSINOPHIL NFR BLD AUTO: 8 %
ERYTHROCYTE [DISTWIDTH] IN BLOOD BY AUTOMATED COUNT: 16 % (ref 10–15)
FLUAV H1 2009 PAND RNA SPEC QL NAA+PROBE: NOT DETECTED
FLUAV H1 RNA SPEC QL NAA+PROBE: NOT DETECTED
FLUAV H3 RNA SPEC QL NAA+PROBE: NOT DETECTED
FLUAV RNA SPEC QL NAA+PROBE: NOT DETECTED
FLUBV RNA SPEC QL NAA+PROBE: NOT DETECTED
GFR SERPL CREATININE-BSD FRML MDRD: 64 ML/MIN/1.73M2
GLUCOSE SERPL-MCNC: 130 MG/DL (ref 70–99)
HADV DNA SPEC QL NAA+PROBE: NOT DETECTED
HCOV PNL SPEC NAA+PROBE: DETECTED
HCT VFR BLD AUTO: 33.5 % (ref 40–53)
HGB BLD-MCNC: 10.8 G/DL (ref 13.3–17.7)
HMPV RNA SPEC QL NAA+PROBE: NOT DETECTED
HPIV1 RNA SPEC QL NAA+PROBE: NOT DETECTED
HPIV2 RNA SPEC QL NAA+PROBE: NOT DETECTED
HPIV3 RNA SPEC QL NAA+PROBE: NOT DETECTED
HPIV4 RNA SPEC QL NAA+PROBE: NOT DETECTED
IGA SERPL-MCNC: 67 MG/DL (ref 84–499)
IGG SERPL-MCNC: 398 MG/DL (ref 610–1616)
IGM SERPL-MCNC: 22 MG/DL (ref 35–242)
IMM GRANULOCYTES # BLD: 0 10E3/UL
IMM GRANULOCYTES NFR BLD: 0 %
KAPPA LC FREE SER-MCNC: 1.08 MG/DL (ref 0.33–1.94)
KAPPA LC FREE/LAMBDA FREE SER NEPH: 1.74 {RATIO} (ref 0.26–1.65)
LAMBDA LC FREE SERPL-MCNC: 0.62 MG/DL (ref 0.57–2.63)
LYMPHOCYTES # BLD AUTO: 1.2 10E3/UL (ref 0.8–5.3)
LYMPHOCYTES NFR BLD AUTO: 35 %
M PNEUMO DNA SPEC QL NAA+PROBE: NOT DETECTED
MCH RBC QN AUTO: 30 PG (ref 26.5–33)
MCHC RBC AUTO-ENTMCNC: 32.2 G/DL (ref 31.5–36.5)
MCV RBC AUTO: 93 FL (ref 78–100)
MONOCYTES # BLD AUTO: 0.5 10E3/UL (ref 0–1.3)
MONOCYTES NFR BLD AUTO: 15 %
NEUTROPHILS # BLD AUTO: 1.4 10E3/UL (ref 1.6–8.3)
NEUTROPHILS NFR BLD AUTO: 41 %
NRBC # BLD AUTO: 0 10E3/UL
NRBC BLD AUTO-RTO: 0 /100
PLATELET # BLD AUTO: 254 10E3/UL (ref 150–450)
POTASSIUM SERPL-SCNC: 4 MMOL/L (ref 3.4–5.3)
PROT SERPL-MCNC: 6 G/DL (ref 6.4–8.3)
RBC # BLD AUTO: 3.6 10E6/UL (ref 4.4–5.9)
RSV RNA SPEC QL NAA+PROBE: NOT DETECTED
RSV RNA SPEC QL NAA+PROBE: NOT DETECTED
RV+EV RNA SPEC QL NAA+PROBE: NOT DETECTED
SARS-COV-2 RNA RESP QL NAA+PROBE: NEGATIVE
SODIUM SERPL-SCNC: 139 MMOL/L (ref 136–145)
TOTAL PROTEIN SERUM FOR ELP: 5.7 G/DL (ref 6.4–8.3)
WBC # BLD AUTO: 3.5 10E3/UL (ref 4–11)

## 2023-02-13 PROCEDURE — 80053 COMPREHEN METABOLIC PANEL: CPT

## 2023-02-13 PROCEDURE — 90723 DTAP-HEP B-IPV VACCINE IM: CPT | Performed by: INTERNAL MEDICINE

## 2023-02-13 PROCEDURE — 90670 PCV13 VACCINE IM: CPT | Performed by: INTERNAL MEDICINE

## 2023-02-13 PROCEDURE — G0463 HOSPITAL OUTPT CLINIC VISIT: HCPCS | Mod: 25

## 2023-02-13 PROCEDURE — 36415 COLL VENOUS BLD VENIPUNCTURE: CPT

## 2023-02-13 PROCEDURE — 84165 PROTEIN E-PHORESIS SERUM: CPT | Mod: 26

## 2023-02-13 PROCEDURE — 250N000011 HC RX IP 250 OP 636: Performed by: STUDENT IN AN ORGANIZED HEALTH CARE EDUCATION/TRAINING PROGRAM

## 2023-02-13 PROCEDURE — 86334 IMMUNOFIX E-PHORESIS SERUM: CPT | Performed by: PATHOLOGY

## 2023-02-13 PROCEDURE — 258N000003 HC RX IP 258 OP 636: Performed by: REGISTERED NURSE

## 2023-02-13 PROCEDURE — 84165 PROTEIN E-PHORESIS SERUM: CPT | Mod: TC | Performed by: PATHOLOGY

## 2023-02-13 PROCEDURE — 250N000021 HC RX MED A9270 GY (STAT IND- M) 250: Performed by: INTERNAL MEDICINE

## 2023-02-13 PROCEDURE — 82784 ASSAY IGA/IGD/IGG/IGM EACH: CPT

## 2023-02-13 PROCEDURE — 99214 OFFICE O/P EST MOD 30 MIN: CPT | Performed by: REGISTERED NURSE

## 2023-02-13 PROCEDURE — 83521 IG LIGHT CHAINS FREE EACH: CPT

## 2023-02-13 PROCEDURE — 90472 IMMUNIZATION ADMIN EACH ADD: CPT | Performed by: INTERNAL MEDICINE

## 2023-02-13 PROCEDURE — 85025 COMPLETE CBC W/AUTO DIFF WBC: CPT

## 2023-02-13 PROCEDURE — 90471 IMMUNIZATION ADMIN: CPT | Performed by: INTERNAL MEDICINE

## 2023-02-13 PROCEDURE — G0009 ADMIN PNEUMOCOCCAL VACCINE: HCPCS | Performed by: INTERNAL MEDICINE

## 2023-02-13 PROCEDURE — 96401 CHEMO ANTI-NEOPL SQ/IM: CPT

## 2023-02-13 PROCEDURE — 96360 HYDRATION IV INFUSION INIT: CPT

## 2023-02-13 PROCEDURE — 86334 IMMUNOFIX E-PHORESIS SERUM: CPT | Mod: 26

## 2023-02-13 PROCEDURE — 250N000011 HC RX IP 250 OP 636: Performed by: INTERNAL MEDICINE

## 2023-02-13 PROCEDURE — 90648 HIB PRP-T VACCINE 4 DOSE IM: CPT | Performed by: INTERNAL MEDICINE

## 2023-02-13 PROCEDURE — 84155 ASSAY OF PROTEIN SERUM: CPT

## 2023-02-13 PROCEDURE — U0003 INFECTIOUS AGENT DETECTION BY NUCLEIC ACID (DNA OR RNA); SEVERE ACUTE RESPIRATORY SYNDROME CORONAVIRUS 2 (SARS-COV-2) (CORONAVIRUS DISEASE [COVID-19]), AMPLIFIED PROBE TECHNIQUE, MAKING USE OF HIGH THROUGHPUT TECHNOLOGIES AS DESCRIBED BY CMS-2020-01-R: HCPCS

## 2023-02-13 PROCEDURE — G0463 HOSPITAL OUTPT CLINIC VISIT: HCPCS | Mod: 25 | Performed by: REGISTERED NURSE

## 2023-02-13 PROCEDURE — 87633 RESP VIRUS 12-25 TARGETS: CPT | Performed by: REGISTERED NURSE

## 2023-02-13 RX ORDER — HEPARIN SODIUM,PORCINE 10 UNIT/ML
5 VIAL (ML) INTRAVENOUS
Status: DISCONTINUED | OUTPATIENT
Start: 2023-02-13 | End: 2023-02-13 | Stop reason: HOSPADM

## 2023-02-13 RX ORDER — HEPARIN SODIUM (PORCINE) LOCK FLUSH IV SOLN 100 UNIT/ML 100 UNIT/ML
5 SOLUTION INTRAVENOUS
Status: DISCONTINUED | OUTPATIENT
Start: 2023-02-13 | End: 2023-02-13 | Stop reason: HOSPADM

## 2023-02-13 RX ADMIN — DIPHTHERIA AND TETANUS TOXOIDS AND ACELLULAR PERTUSSIS ADSORBED, HEPATITIS B (RECOMBINANT) AND INACTIVATED POLIOVIRUS VACCINE COMBINED 0.5 ML: 25; 10; 25; 25; 8; 10; 40; 8; 32 INJECTION, SUSPENSION INTRAMUSCULAR at 11:52

## 2023-02-13 RX ADMIN — DARATUMUMAB AND HYALURONIDASE-FIHJ (HUMAN RECOMBINANT) 1800 MG: 1800; 30000 INJECTION SUBCUTANEOUS at 12:37

## 2023-02-13 RX ADMIN — SODIUM CHLORIDE 1000 ML: 9 INJECTION, SOLUTION INTRAVENOUS at 12:14

## 2023-02-13 RX ADMIN — PNEUMOCOCCAL 13-VALENT CONJUGATE VACCINE 0.5 ML: 2.2; 2.2; 2.2; 2.2; 2.2; 4.4; 2.2; 2.2; 2.2; 2.2; 2.2; 2.2; 2.2 INJECTION, SUSPENSION INTRAMUSCULAR at 11:53

## 2023-02-13 RX ADMIN — HAEMOPHILUS B POLYSACCHARIDE CONJUGATE VACCINE FOR INJ 0.5 ML: RECON SOLN at 11:52

## 2023-02-13 ASSESSMENT — PAIN SCALES - GENERAL
PAINLEVEL: NO PAIN (0)
PAINLEVEL: NO PAIN (0)

## 2023-02-13 NOTE — PROGRESS NOTES
Infusion Nursing Note:  June Ronquillo presents today for C14D1 Darzalex Faspro SQ-1 L NS.    Patient seen by provider today: Yes: Laura Ballesteros, KELSY    present during visit today: Not Applicable.    Note: Pt saw provider prior to infusion, ok for treatment.  No Zometa today per Laura.      Intravenous Access:  Peripheral IV placed.    Treatment Conditions:   Latest Reference Range & Units 02/13/23 10:35   Sodium 136 - 145 mmol/L 139   Potassium 3.4 - 5.3 mmol/L 4.0   Chloride 98 - 107 mmol/L 107   Carbon Dioxide (CO2) 22 - 29 mmol/L 23   Urea Nitrogen 6.0 - 20.0 mg/dL 11.5   Creatinine 0.67 - 1.17 mg/dL 1.32 (H)   GFR Estimate >60 mL/min/1.73m2 64   Calcium 8.6 - 10.0 mg/dL 8.6   Anion Gap 7 - 15 mmol/L 9   Albumin 3.5 - 5.2 g/dL 4.2   Protein Total 6.4 - 8.3 g/dL 6.0 (L)   Alkaline Phosphatase 40 - 129 U/L 50   ALT 10 - 50 U/L 12   AST 10 - 50 U/L 22   Bilirubin Total <=1.2 mg/dL 0.4   Glucose 70 - 99 mg/dL 130 (H)   WBC 4.0 - 11.0 10e3/uL 3.5 (L)   Hemoglobin 13.3 - 17.7 g/dL 10.8 (L)   Hematocrit 40.0 - 53.0 % 33.5 (L)   Platelet Count 150 - 450 10e3/uL 254   RBC Count 4.40 - 5.90 10e6/uL 3.60 (L)   MCV 78 - 100 fL 93   MCH 26.5 - 33.0 pg 30.0   MCHC 31.5 - 36.5 g/dL 32.2   RDW 10.0 - 15.0 % 16.0 (H)   % Neutrophils % 41   % Lymphocytes % 35   % Monocytes % 15   % Eosinophils % 8   % Basophils % 1   Absolute Basophils 0.0 - 0.2 10e3/uL 0.1   Absolute Eosinophils 0.0 - 0.7 10e3/uL 0.3   Absolute Immature Granulocytes <=0.4 10e3/uL 0.0   Absolute Lymphocytes 0.8 - 5.3 10e3/uL 1.2   Absolute Monocytes 0.0 - 1.3 10e3/uL 0.5   % Immature Granulocytes % 0   Absolute Neutrophils 1.6 - 8.3 10e3/uL 1.4 (L)   Absolute NRBCs 10e3/uL 0.0   NRBCs per 100 WBC <1 /100 0   IGA 84 - 499 mg/dL 67 (L)    - 1,616 mg/dL 398 (L)   IGM 35 - 242 mg/dL 22 (L)   Dime Box Free Lt Chain 0.33 - 1.94 mg/dL 1.08   Kappa Lambda Ratio 0.26 - 1.65  1.74 (H)   Lambda Free Lt Chain 0.57 - 2.63 mg/dL 0.62   Total Protein Serum  for ELP 6.4 - 8.3 g/dL 5.7 (L)     Post Infusion Assessment:  Patient tolerated infusion without incident.  Patient tolerated injection into RUQ of abdomen without incident.  Blood return noted pre and post infusion.  Site patent and intact, free from redness, edema or discomfort.  No evidence of extravasations.  Access discontinued per protocol.       Discharge Plan:   Patient declined prescription refills.  Discharge instructions reviewed with: Patient.  Patient and/or family verbalized understanding of discharge instructions and all questions answered.  AVS to patient via [employer].  Patient will return 3/13 for next appointment.   Patient discharged in stable condition accompanied by: self.  Departure Mode: Ambulatory.    Dalia Montoay RN

## 2023-02-13 NOTE — NURSING NOTE
14 month vaccines given in bilateral deltoid muscles in clinic, pt tolerated. Shingrix not given per provider as pt will be on acyclovir for the rest of his life. VIS provided, see MAR for details.    Kathy Xavier CMA on 2/13/2023 at 11:55 AM

## 2023-02-13 NOTE — NURSING NOTE
"Oncology Rooming Note    February 13, 2023 10:55 AM   June Ronquillo is a 52 year old male who presents for:    Chief Complaint   Patient presents with     Oncology Clinic Visit     Multiple myeloma     Initial Vitals: /85   Pulse 74   Temp 98  F (36.7  C) (Oral)   Resp 16   Wt 71.7 kg (158 lb 1.1 oz)   SpO2 98%   BMI 26.30 kg/m   Estimated body mass index is 26.3 kg/m  as calculated from the following:    Height as of 1/16/23: 1.651 m (5' 5\").    Weight as of this encounter: 71.7 kg (158 lb 1.1 oz). Body surface area is 1.81 meters squared.  No Pain (0) Comment: Data Unavailable   No LMP for male patient.  Allergies reviewed: Yes  Medications reviewed: Yes    Medications: Medication refills not needed today.  Pharmacy name entered into [employer]:    [facility] [place], [place] - [address] [facility] - [place], [place] - [address] [place] [address] [address]VE [facility] [place], [place] - [phone]    Clinical concerns: none       Kathy Xavier CMA            "

## 2023-02-13 NOTE — RESULT ENCOUNTER NOTE
"Hi Logan,    Your respiratory panel came back positive for a NON-Covid version of coronavirus - this is one of the coronvirus variants that has been around causing \"common colds\" for years/centuries, not the more virulent COVID mutation. Recommendations are rest and plenty of fluids.     Your IgG came back at 398, so I don't think the IVIG infusion is indicated at this time.  We will keep an eye on the level moving forward and if it is falling, reconsider replacing with IVIG.     Let me know if you have any questions.    Take care,  Laura Ballesteros, CNP    " Date of Service:  3/25/2021    Chief Complaint:  Microscopic hematuria    History of Present Illness:    Latanya Gagnon is a 69 year old female who was referred for microscopic hematuria.  She had a UA demonstrating large blood on March 9, 2021. She underwent a CT stone protocol on March 17, 2021 that showed no evidence of hydronephrosis, renal contour abnormalities, or kidney stones.  She has no family history of  malignancy.  She does have occasional suprapubic pain that is associated with right lower quadrant pain that radiates around for bilateral lower back pain that seems to be worse with activity.  She denies any gross hematuria.  She does have baseline urinary urgency and near incontinence episodes with coffee consumption.  She has no personal history of kidney stones and has not had a UTI since her 30s.  She quit smoking in 1995 and smoked for a total of about 15 years.  She has a history of hysterectomy is 58 years of age, no known malignancy.  UA today shows trace blood          PSA Test Results:  No results found for: PSA    Past Medical History:   Past Medical History:   Diagnosis Date   • Acquired absence of both cervix and uterus 10/07/2011   • Acute bronchitis    • Anxiety    • Diverticulitis    • Drusen (degenerative) of retina 10/31/2013   • Early cataract 10/31/2013   • Esophageal reflux    • Esophagitis    • Essential (primary) hypertension    • Fibroids     uterine   • Gastric ulcer    • Hammertoe of left foot     2nd and 3rd digits   • Helicobacter pylori (H. pylori)    • Hypercholesterolemia    • IBS (irritable bowel syndrome)    • Inflammatory bowel disease    • Macular degeneration    • Nail dystrophy    • Other nonspecific findings on examination of blood(790.99)    • Pneumonia    • Postmenopausal bleeding    • Thrombocytopenia (CMS/HCC)    • Unspecified disorder of lipoid metabolism    • Unspecified otitis media    • Unspecified sinusitis (chronic)    • Urinary tract infection    •  Uterine hyperplasia        Surgical History:  Past Surgical History:   Procedure Laterality Date   • Appendectomy     • Biopsy of breast, needle core     • Breast biopsy  01/01/1993   • Breast biopsy  01/01/2003   • Colonoscopy  03/25/2010   • Ct angiogram coronary arteries  6/24/2019   • Cyst removal      bartholin's   • D and c  04/29/2010   • Dexa bone density axial skeleton  05/20/2011   • Esophagogastroduodenoscopy  03/25/2010    with biopsy   • Excis bartholin gland/cyst     • Hysterectomy     • Laparoscopy,pelvic,biopsy      endometrial   • Nail removal  04/10/2017   • Occult blood test tube  04/28/2011   • Removal of nail bed Left     Matrixectomy 2nd digit left foot   • Removal of nail plate Left 04/10/2017    left second toe   • Service to gastroenterology     • Sinus surgery     • Skin biopsy     • Tonsillectomy and adenoidectomy     • Total abdominal hysterectomy w/ bilateral salpingoophorectomy     :    Family History:  Family history of genitourinary malignancy - no  Family History   Problem Relation Age of Onset   • Hypertension Mother    • Cataracts Mother    • Cancer Mother    • High blood pressure Mother    • Heart disease Father    • High cholesterol Father    • High blood pressure Brother    • High cholesterol Brother    • Thyroid Neg Hx    • Diabetes Neg Hx    • Other Neg Hx         no calcium problems       Social History:  Social History     Socioeconomic History   • Marital status:      Spouse name: Not on file   • Number of children: 1   • Years of education: Not on file   • Highest education level: Not on file   Occupational History   • Occupation: retired   Social Needs   • Financial resource strain: Not on file   • Food insecurity     Worry: Not on file     Inability: Not on file   • Transportation needs     Medical: Not on file     Non-medical: Not on file   Tobacco Use   • Smoking status: Former Smoker     Packs/day: 0.50     Years: 20.00     Pack years: 10.00     Types:  Cigarettes     Quit date: 1995     Years since quittin.2   • Smokeless tobacco: Never Used   Substance and Sexual Activity   • Alcohol use: Yes     Alcohol/week: 1.0 standard drinks     Types: 1 Standard drinks or equivalent per week     Comment: rare- 2 a month   • Drug use: No   • Sexual activity: Not Currently     Partners: Male     Birth control/protection: Post-menopausal   Lifestyle   • Physical activity     Days per week: Not on file     Minutes per session: Not on file   • Stress: Not on file   Relationships   • Social connections     Talks on phone: Not on file     Gets together: Not on file     Attends Voodoo service: Not on file     Active member of club or organization: Not on file     Attends meetings of clubs or organizations: Not on file     Relationship status: Not on file   • Intimate partner violence     Fear of current or ex partner: Not on file     Emotionally abused: Not on file     Physically abused: Not on file     Forced sexual activity: Not on file   Other Topics Concern   • Not on file   Social History Narrative    21    Date: 21                                   [place]place[place]    Raised[place]    Why [place]:  For work     Level of Education: high school    Current Employer:  retired    Employment History:  [employer]    Marital Status:     Children: one son    Smoking: history of smoking quit in     Alcohol: one to two a month    Drugs: no    Diet:  2    Excerise: yard    Level of stress: home some                                 POA: no but will do    Code status: yes her first contact is her son Diloln    .       Allergies:  ALLERGIES:   Allergen Reactions   • Amoxicillin RASH   • Benadryl Allergy ANXIETY     Benadryl in IV FORM, patient states she can take the OTC pill       Medications:  Current Outpatient Medications   Medication Sig Dispense Refill   • ezetimibe (ZETIA) 10 MG tablet Take 1 tablet by mouth daily. 90 tablet 0   •  metoPROLOL succinate (Toprol XL) 25 MG 24 hr tablet Take 1 tablet by mouth 2 times daily. 90 tablet 2   • pravastatin (PRAVACHOL) 10 MG tablet Take 1 tablet by mouth every 48 hours. 90 tablet 0   • rivaroxaban (Xarelto) 20 MG Tab Take 1 tablet by mouth daily (with dinner). Need labs for further refills 90 tablet 0   • WIXELA INHUB 250-50 MCG/DOSE inhaler INL 1 PUFF PO TWICE DAILY. RINSE AND GARGLE MOUTH AFTER U     • Multiple Vitamins-Minerals (MULTIVITAMIN WOMEN 50+ PO) Take 1 tablet by mouth daily.     • mometasone (ELOCON) 0.1 % cream Apply a thin layer to rash on arms and legs twice daily as needed 45 g 2   • loratadine (CLARITIN) 10 MG tablet Take 10 mg by mouth as needed.      • Blood Pressure Monitoring (B-D ASSURE BPM/AUTO ARM CUFF) [facility] Check BP and HR daily and if symptomatic 1 each 0   • Coenzyme Q10 (CO Q-10) 120 MG CAPS Take 1 tablet by mouth.      • Misc Natural Products (GLUCOS-CHONDROIT-MSM COMPLEX) TABS Take  by mouth.     • Cholecalciferol (VITAMIN D) 2000 UNIT OR TABS 2000 units daily     • OMEGA 3 1000 MG OR CAPS 1400 mg dialy       No current facility-administered medications for this visit.          Review of Systems:  General:  Patient denies fever, chills, night sweats, excessive fatigue, anorexia, weight loss, weakness, or hot flashes.   Eyes:  Patient denies blurred vision, double vision, pain, burning and itching.  Neurologic:  Patient denies headache, dizziness, numbness, loss of balance or insomnia.  Gastrointestinal:  Patient denies nausea, vomiting, diarrhea, constipation, blood in stool, or indigestion/heartburn.  Cardiovascular:  Patient denies chest pain or palpitations.  Skin:  Patient denies skin rashes, bruising, or persistent itching.  Musculoskeletal:  Patient denies joint pain, bone pain, leg and ankle swelling.  ENT/Mouth:  Patient denies dysphagia, dry mouth, sore throat, sores on tongue, mouth sores, sinus drainage, or hearing loss.  Respiratory:  Patient denies wheezing,  frequent cough, or shortness of breath.  Psych:  Patient denies anxiety or depression.    Remaining systems were reviewed and negative except as noted above.     Physical Exam:  Constitutional:  Visit Vitals  /89   Pulse 61   Resp 16   Ht 5' 8\" (1.727 m)   Wt 82.6 kg   LMP 01/01/2005 (Exact Date)   BMI 27.69 kg/m²     Well developed, well nourished, and afebrile.    Psychiatric:  Alert and oriented x3.  Normal mood and affect.    Eyes:  Conjunctivae and lids are normal.  Pupils and irises are equal and reactive to light.    HEENT:  External inspection of ears and nose reveals no gross abnormalities.  No hearing loss to whisper.    Neck: External inspection without gross abnormality, symmetric, trachea is midline    Respiratory:  No abnormal respiratory effort or wheezing.  Lungs clear to auscultation bilaterally.    Cardiovascular:  No peripheral edema.  Regular rate and rhythm.     Gastrointestinal:  Abdomen soft, nontender, nondistended.  No hepatosplenomegaly.  No hernias.    Musculoskeletal:  Normal gait and station.  Inspection of digits and nails reveals no clubbing or cyanosis.    Skin:  Warm and dry with no lesions or induration.    Genitourinary: Deferred to cysto    Record Review:    I met with patient, Latanya Gagnon, and obtained pertinent history and exam.    Reviewed tests:  Urinalysis    The records were reviewed, as above.    Assessment and Plan:    Latanya Gagnon is a 69 year old female with UA strongly suggestive of microscopic hematuria, large on March 9, 2021 with no coexisting infection and she is status post hysterectomy.  CT stone was personally reviewed with the patient from March 17, 2021 without evidence of source for hematuria    1. Will move forward with completion of microscopic hematuria workup.  Will get completion CT urogram and patient will follow-up for cystoscopy    MDM/Complexity Statement:  1 undiagnosed new problem with uncertain prognosis

## 2023-02-13 NOTE — PROGRESS NOTES
Oncologic Hx:   - 4/30/2021 M spike of 34.8 in urine.M spike in blood 0.2; IgG 702 with depressed IgA and IgM. Beta 2 microglobulin 2.7. Lambda  with K/L ratio of 0.01. WBC 11.1 with absolute eos of 1.0. hgb, plt, Cr (1.1), and albumin WNL.    -4/30/2021 CT abd/pelvis showed sclerotic marrow changes with numerous lytic appearing lesions throughout the imaged portions of the spine, pelvis and ribs.      -PET scan 5/11/2021 Numerous osteolytic lesions which predominantly demonstrate uptake below liver background levels. There is one intraosseous lesion in the left lateral 9th rib that demonstrates uptake above background, possibly due to nondisplaced fracture. Adjacent to this lesion is mild  hypermetabolism to the left pleura, with new small left pleural effusion, suspicious for malignant effusion versus posttraumatic effusion. Pleural uptake may represent extramedullary myeloma extending along the intercostal space versus inflammation.    - BM bx 5/17/2021 with flow showing 4.0% plasma cells which express CD19 (dim), CD38 (bright), CD45 (dim), , and monotypic cytoplasmic lambda immunoglobulin light chains but lack CD20 and CD56.  Hypercellular bone marrow for age (approximately 75% cellularity) with adequate trilineage hematopoiesis. Plasma cell infiltrate: Interstitial, lambda monotypic and representing approximately 60% the bone marrow cellularity, by  immunohistochemical stain. Cytpgenetics with 2 related hypodiploid clones. One with loss of Y, monosomy 13 and 14 (5%) and one with translocation of 8p and 14, derivative 16 with long arm replaced by extra copy 1q,monosomy 21. FISH showed gain of 1q, loss of 13 and 14, IGH-MYC fusion t(8:14)    - Started Miracle-RVd 21 day with velcade on days 1,8,15.     -Cycle 2 Miracle-RVd. Dose reduce dex to 80 mg d/t fatigue and stomach upset on dex days. Also having cough with basilar streaking on CXR    - 8/31/2021 BM bx -rare suspicious plasma cells in touch  imprint. No increase in plasma cells by morph.    -8/31/2021 PET/CT Diffuse osteolytic lesions throughout the axial and appendicular skeleton. Increased sclerosis since prior exam 5/11/2021 without increased metabolism or size.  Hypermetabolic pretracheal lymph node as well as hypermetabolic level 2 lymph nodes within the right neck. These lymph nodes are likely reactive from autoimmune activation via medical therapy. Hypermetabolic subcentimeter nodules within the thyroid gland    - 11/11/2021 Autologous BMT    - 2/21/22 started maintenance with Revlimid 10 mg daily and daratumumab monthly; did Revlimid alone for C1 due to low IgG levels, added daratumumab starting C2    Interval Hx:   Sail presents to clinic prior to daratumumab and zometa today. Reports sinus congestion/drainage that seems to escalate about every 6 weeks, and then improves.  Slight cough in the mornings. No fevers.  Appetite and energy are stable. He has been taking some OTC decongestants to help. No new pain. Does some domestic travel for work, drives when he can. Waiting to do international travel until after his BMT vaccines are completed.    Physical Exam:  /85   Pulse 74   Temp 98  F (36.7  C) (Oral)   Resp 16   Wt 71.7 kg (158 lb 1.1 oz)   SpO2 98%   BMI 26.30 kg/m      Constitutional: NAD  Eyes: no scleral icterus  ENT: no oral lesions  Lymph: no cervical, supraclavicular, axillary LAD  Pulm: CTAB  CV: RRR, no m/r/g  GI: bowel sounds present, soft and nontender to palpation  MSK: No edema in the extremities  Neuro: alert, conversant, normal gait  Psych: appropriate mood and affect    Labs:  I personally reviewed the following labs:    Most Recent 3 CBC's:  Recent Labs   Lab Test 02/13/23  1035 01/18/23  0901 01/16/23  1524   WBC 3.5* 7.7 4.8   HGB 10.8* 11.4* 11.2*   MCV 93 93 92    295 295     Most Recent 3 BMP's:  Recent Labs   Lab Test 02/13/23  1035 01/18/23  0901 01/16/23  1524    140 139   POTASSIUM  4.0 4.2 3.7   CHLORIDE 107 107 104   CO2 23 26 27   BUN 11.5 15.3 13.7   CR 1.32* 1.26* 1.17   ANIONGAP 9 7 8   HARDEEP 8.6 9.1 9.2   * 115* 105*     Most Recent 2 LFT's:  Recent Labs   Lab Test 02/13/23  1035 01/18/23  0901   AST 22 22   ALT 12 12   ALKPHOS 50 53   BILITOTAL 0.4 0.3       Assessment and Plan  Logan has multiple myeloma with high risk cytogenetics. He got Vidya-RVD with VGPR, then underwent autologous transplant 11/11/21. Continue acyclovir daily for viral ppx and bactrim M/T for PJP ppx. Given his high risk cytogenetics he started vidya + revlimid maintenance therapy 2/21/22. Follow IgG and replete if it remains <300. He had severe flu-like effects from his first dose of Zometa but he has been tolerating monthly Zometa dosed at 3 mg. Continue Vit D. Continue B vitamin supplement to help his neuropathy.   - will proceed with vidya + revlimid today  - Will hold Zometa today due to elevated creatinine, likely in the setting of decongestant use. Asked him to try to cut back on the decongestants if possible.  Will give 1 liter IVF at infusion today.     -Evusheld given 10/11/22, no longer giving any additional doses  - Influenza vaccine 1/16/23  - Due for next set of BMT vaccines today    Elevated serum creatinine  Creatinine has been slightly elevated above baseline in the past.  Elevated to 1.3 today, likely in the setting of OTC decongestant use. Asked him to try to cut back a little on the decongestants.  Will give 1 liter IVF at infusion today.  Will monitor in case revlimid needs to be dose reduced and he needs nephrology consult.    Sinus Congestion  Has had recurrent mild sinus congestion that seems to escalate about every 6 weeks and then self-resolve.   - Check COVID and respiratory viral panel today  - Recommend cutting back on OTC decongestants due to elevated kidney function  - Awaiting IgG result from today; may consider replacing if < 400 due to recurrent  infections.    Hypogammaglobulinemia Start monthly IV Ig whenever IgG <300 (consider increasing to < 400 due to recurrent infections; awaiting today's result)    Normocytic Anemia: likely d/t revlimid and daratumumab. Will keep monitoring and dose reduce if needed.      35 minutes spent on the date of the encounter doing chart review, review of test results, patient visit and documentation     PATTY Meza [facility] [place] [place], [place] [address] [phone]

## 2023-02-13 NOTE — LETTER
2/13/2023         RE: June Ronquillo  [address]  [facility] [id number]        Dear Colleague,    Thank you for referring your patient, June Ronquillo, to the [facility]. Please see a copy of my visit note below.    Oncologic Hx:   - 4/30/2021 M spike of 34.8 in urine.M spike in blood 0.2; IgG 702 with depressed IgA and IgM. Beta 2 microglobulin 2.7. Lambda  with K/L ratio of 0.01. WBC 11.1 with absolute eos of 1.0. hgb, plt, Cr (1.1), and albumin WNL.    -4/30/2021 CT abd/pelvis showed sclerotic marrow changes with numerous lytic appearing lesions throughout the imaged portions of the spine, pelvis and ribs.      -PET scan 5/11/2021 Numerous osteolytic lesions which predominantly demonstrate uptake below liver background levels. There is one intraosseous lesion in the left lateral 9th rib that demonstrates uptake above background, possibly due to nondisplaced fracture. Adjacent to this lesion is mild  hypermetabolism to the left pleura, with new small left pleural effusion, suspicious for malignant effusion versus posttraumatic effusion. Pleural uptake may represent extramedullary myeloma extending along the intercostal space versus inflammation.    - BM bx 5/17/2021 with flow showing 4.0% plasma cells which express CD19 (dim), CD38 (bright), CD45 (dim), , and monotypic cytoplasmic lambda immunoglobulin light chains but lack CD20 and CD56.  Hypercellular bone marrow for age (approximately 75% cellularity) with adequate trilineage hematopoiesis. Plasma cell infiltrate: Interstitial, lambda monotypic and representing approximately 60% the bone marrow cellularity, by  immunohistochemical stain. Cytpgenetics with 2 related hypodiploid clones. One with loss of Y, monosomy 13 and 14 (5%) and one with translocation of 8p and 14, derivative 16 with long arm replaced by extra copy 1q,monosomy 21. FISH showed gain of 1q, loss of 13 and 14, IGH-MYC fusion  t(8:14)    - Started Miracle-RVd 21 day with velcade on days 1,8,15.     -Cycle 2 Miracle-RVd. Dose reduce dex to 80 mg d/t fatigue and stomach upset on dex days. Also having cough with basilar streaking on CXR    - 8/31/2021 BM bx -rare suspicious plasma cells in touch imprint. No increase in plasma cells by morph.    -8/31/2021 PET/CT Diffuse osteolytic lesions throughout the axial and appendicular skeleton. Increased sclerosis since prior exam 5/11/2021 without increased metabolism or size.  Hypermetabolic pretracheal lymph node as well as hypermetabolic level 2 lymph nodes within the right neck. These lymph nodes are likely reactive from autoimmune activation via medical therapy. Hypermetabolic subcentimeter nodules within the thyroid gland    - 11/11/2021 Autologous BMT    - 2/21/22 started maintenance with Revlimid 10 mg daily and daratumumab monthly; did Revlimid alone for C1 due to low IgG levels, added daratumumab starting C2    Interval Hx:   [place]rapal presents to clinic prior to daratumumab and zometa today. Reports sinus congestion/drainage that seems to escalate about every 6 weeks, and then improves.  Slight cough in the mornings. No fevers.  Appetite and energy are stable. He has been taking some OTC decongestants to help. No new pain. Does some domestic travel for work, drives when he can. Waiting to do international travel until after his BMT vaccines are completed.    Physical Exam:  /85   Pulse 74   Temp 98  F (36.7  C) (Oral)   Resp 16   Wt 71.7 kg (158 lb 1.1 oz)   SpO2 98%   BMI 26.30 kg/m      Constitutional: NAD  Eyes: no scleral icterus  ENT: no oral lesions  Lymph: no cervical, supraclavicular, axillary LAD  Pulm: CTAB  CV: RRR, no m/r/g  GI: bowel sounds present, soft and nontender to palpation  MSK: No edema in the extremities  Neuro: alert, conversant, normal gait  Psych: appropriate mood and affect    Labs:  I personally reviewed the following labs:    Most Recent 3  CBC's:  Recent Labs   Lab Test 02/13/23  1035 01/18/23  0901 01/16/23  1524   WBC 3.5* 7.7 4.8   HGB 10.8* 11.4* 11.2*   MCV 93 93 92    295 295     Most Recent 3 BMP's:  Recent Labs   Lab Test 02/13/23  1035 01/18/23  0901 01/16/23  1524    140 139   POTASSIUM 4.0 4.2 3.7   CHLORIDE 107 107 104   CO2 23 26 27   BUN 11.5 15.3 13.7   CR 1.32* 1.26* 1.17   ANIONGAP 9 7 8   HARDEEP 8.6 9.1 9.2   * 115* 105*     Most Recent 2 LFT's:  Recent Labs   Lab Test 02/13/23  1035 01/18/23  0901   AST 22 22   ALT 12 12   ALKPHOS 50 53   BILITOTAL 0.4 0.3       Assessment and Plan  Logan has multiple myeloma with high risk cytogenetics. He got Vidya-RVD with VGPR, then underwent autologous transplant 11/11/21. Continue acyclovir daily for viral ppx and bactrim M/T for PJP ppx. Given his high risk cytogenetics he started vidya + revlimid maintenance therapy 2/21/22. Follow IgG and replete if it remains <300. He had severe flu-like effects from his first dose of Zometa but he has been tolerating monthly Zometa dosed at 3 mg. Continue Vit D. Continue B vitamin supplement to help his neuropathy.   - will proceed with vidya + revlimid today  - Will hold Zometa today due to elevated creatinine, likely in the setting of decongestant use. Asked him to try to cut back on the decongestants if possible.  Will give 1 liter IVF at infusion today.     -Evusheld given 10/11/22, no longer giving any additional doses  - Influenza vaccine 1/16/23  - Due for next set of BMT vaccines today    Elevated serum creatinine  Creatinine has been slightly elevated above baseline in the past.  Elevated to 1.3 today, likely in the setting of OTC decongestant use. Asked him to try to cut back a little on the decongestants.  Will give 1 liter IVF at infusion today.  Will monitor in case revlimid needs to be dose reduced and he needs nephrology consult.    Sinus Congestion  Has had recurrent mild sinus congestion that seems to escalate about every 6  weeks and then self-resolve.   - Check COVID and respiratory viral panel today  - Recommend cutting back on OTC decongestants due to elevated kidney function  - Awaiting IgG result from today; may consider replacing if < 400 due to recurrent infections.    Hypogammaglobulinemia Start monthly IV Ig whenever IgG <300 (consider increasing to < 400 due to recurrent infections; awaiting today's result)    Normocytic Anemia: likely d/t revlimid and daratumumab. Will keep monitoring and dose reduce if needed.      35 minutes spent on the date of the encounter doing chart review, review of test results, patient visit and documentation     PATTY Meza [facility]  [address] [place] [address] [phone]

## 2023-02-13 NOTE — NURSING NOTE
NP and nasal swabs collected in clinic, pt tolerated. Specimen sent to lab, pt brought to infusion.     Kathy Xavier CMA on 2/13/2023 at 11:56 AM

## 2023-02-14 LAB
ALBUMIN SERPL ELPH-MCNC: 3.8 G/DL (ref 3.7–5.1)
ALPHA1 GLOB SERPL ELPH-MCNC: 0.3 G/DL (ref 0.2–0.4)
ALPHA2 GLOB SERPL ELPH-MCNC: 0.5 G/DL (ref 0.5–0.9)
B-GLOBULIN SERPL ELPH-MCNC: 0.7 G/DL (ref 0.6–1)
GAMMA GLOB SERPL ELPH-MCNC: 0.4 G/DL (ref 0.7–1.6)
M PROTEIN SERPL ELPH-MCNC: 0.1 G/DL
PROT PATTERN SERPL ELPH-IMP: ABNORMAL
PROT PATTERN SERPL IFE-IMP: NORMAL

## 2023-02-20 ENCOUNTER — TELEPHONE (OUTPATIENT)
Dept: TRANSPLANT | Facility: CLINIC | Age: 53
End: 2023-02-20
Payer: COMMERCIAL

## 2023-02-20 DIAGNOSIS — C90.00 MULTIPLE MYELOMA, REMISSION STATUS UNSPECIFIED (H): Primary | ICD-10-CM

## 2023-02-20 NOTE — TELEPHONE ENCOUNTER
Called Pt to make him aware of the request for him to be seen by Pulmonary medicine. Pt is scheduled to be seen April 11th. Pt already aware.

## 2023-02-20 NOTE — TELEPHONE ENCOUNTER
RECORDS RECEIVED FROM: internal    DATE RECEIVED: 4.11.23   NOTES STATUS DETAILS   OFFICE NOTE from referring provider internal  Barbi Vazquez MD   OFFICE NOTE from other specialist     DISCHARGE SUMMARY from hospital     DISCHARGE REPORT from the ER internal  11.20.21 Brenda  11.10.21 Julieta   5/17/21 Regalado   MEDICATION LIST internal     IMAGING  (NEED IMAGES AND REPORTS)     CT SCAN internal  12.5.22, 8.31.21, 5.11.21   CHEST XRAY (CXR) internal  11.19.22, 8/18/22,   PET- 12.5.22, 2.9.22, 11.20.21, 10.18.21 more in epic    TESTS     PULMONARY FUNCTION TESTING (PFT) internal  12.19.22, 10.13.21      Action 2.20.23 sv   Action Taken Message sent to nurse pool to place PFT and CXR order

## 2023-02-21 ENCOUNTER — TELEPHONE (OUTPATIENT)
Dept: PULMONOLOGY | Facility: CLINIC | Age: 53
End: 2023-02-21
Payer: COMMERCIAL

## 2023-02-21 NOTE — TELEPHONE ENCOUNTER
Patient Contacted for the patient to call back and schedule the following:    Appointment type: CXR  Provider: SAMANTHA  Return date: 4/11/23  Specialty phone number: [phone]  Additional appointment(s) needed: NA  Additonal Notes: Pt declined mailed itinerary.

## 2023-02-23 DIAGNOSIS — C90.00 MULTIPLE MYELOMA NOT HAVING ACHIEVED REMISSION (H): Primary | ICD-10-CM

## 2023-02-23 RX ORDER — LENALIDOMIDE 10 MG/1
10 CAPSULE ORAL DAILY
Qty: 28 CAPSULE | Refills: 0 | Status: SHIPPED | OUTPATIENT
Start: 2023-02-23 | End: 2023-04-11

## 2023-02-28 DIAGNOSIS — R94.2 ABNORMAL PFT: Primary | ICD-10-CM

## 2023-02-28 NOTE — TELEPHONE ENCOUNTER
RECORDS RECEIVED FROM: internal     DATE RECEIVED: 4.18.23    NOTES STATUS DETAILS   OFFICE NOTE from referring provider internal  Barbi Vazquez MD   OFFICE NOTE from other specialist       DISCHARGE SUMMARY from hospital       DISCHARGE REPORT from the ER internal  11.20.21 Brenda  11.10.21 [facility]   5/17/21 Regalado   MEDICATION LIST internal      IMAGING  (NEED IMAGES AND REPORTS)       CT SCAN internal  12.5.22, 8.31.21, 5.11.21   CHEST XRAY (CXR) internal  11.19.22, 8/18/22,   PET- 12.5.22, 2.9.22, 11.20.21, 10.18.21 more in epic     Scheduled 4.11.23   TESTS       PULMONARY FUNCTION TESTING (PFT) internal  12.19.22, 10.13.21      Action 2.20.23 sv   Action Taken Message sent to nurse pool to place PFT order

## 2023-03-01 ENCOUNTER — OFFICE VISIT (OUTPATIENT)
Dept: URGENT CARE | Facility: URGENT CARE | Age: 53
End: 2023-03-01
Payer: COMMERCIAL

## 2023-03-01 ENCOUNTER — APPOINTMENT (OUTPATIENT)
Dept: LAB | Facility: CLINIC | Age: 53
End: 2023-03-01
Payer: COMMERCIAL

## 2023-03-01 VITALS
SYSTOLIC BLOOD PRESSURE: 130 MMHG | HEART RATE: 66 BPM | TEMPERATURE: 97.8 F | DIASTOLIC BLOOD PRESSURE: 85 MMHG | WEIGHT: 149 LBS | BODY MASS INDEX: 24.79 KG/M2 | RESPIRATION RATE: 18 BRPM | OXYGEN SATURATION: 100 %

## 2023-03-01 DIAGNOSIS — R19.7 ACUTE DIARRHEA: Primary | ICD-10-CM

## 2023-03-01 DIAGNOSIS — C90.00 MULTIPLE MYELOMA NOT HAVING ACHIEVED REMISSION (H): ICD-10-CM

## 2023-03-01 PROCEDURE — 80053 COMPREHEN METABOLIC PANEL: CPT | Performed by: PHYSICIAN ASSISTANT

## 2023-03-01 PROCEDURE — U0003 INFECTIOUS AGENT DETECTION BY NUCLEIC ACID (DNA OR RNA); SEVERE ACUTE RESPIRATORY SYNDROME CORONAVIRUS 2 (SARS-COV-2) (CORONAVIRUS DISEASE [COVID-19]), AMPLIFIED PROBE TECHNIQUE, MAKING USE OF HIGH THROUGHPUT TECHNOLOGIES AS DESCRIBED BY CMS-2020-01-R: HCPCS | Performed by: PHYSICIAN ASSISTANT

## 2023-03-01 PROCEDURE — 99214 OFFICE O/P EST MOD 30 MIN: CPT | Mod: CS | Performed by: PHYSICIAN ASSISTANT

## 2023-03-01 PROCEDURE — 87506 IADNA-DNA/RNA PROBE TQ 6-11: CPT | Performed by: PHYSICIAN ASSISTANT

## 2023-03-01 PROCEDURE — U0005 INFEC AGEN DETEC AMPLI PROBE: HCPCS | Performed by: PHYSICIAN ASSISTANT

## 2023-03-01 ASSESSMENT — ENCOUNTER SYMPTOMS
DIARRHEA: 1
RHINORRHEA: 1
FATIGUE: 1
WHEEZING: 0
VOMITING: 0
SHORTNESS OF BREATH: 0
ABDOMINAL PAIN: 0
COUGH: 1
BLOOD IN STOOL: 0
FEVER: 0
NAUSEA: 1
DIZZINESS: 0

## 2023-03-01 NOTE — PROGRESS NOTES
Assessment & Plan:        ICD-10-CM    1. Acute diarrhea  R19.7 Comprehensive metabolic panel (BMP + Alb, Alk Phos, ALT, AST, Total. Bili, TP)     Symptomatic COVID-19 Virus (Coronavirus) by PCR Nose     Enteric Bacteria and Virus Panel by HILARIO Stool     CBC with platelets and differential      2. Multiple myeloma not having achieved remission (H)  C90.00             Plan/Clinical Decision Making:    Patient with severe, persistent diarrhea for 3 1/2 days, no vomiting with mild cold symptoms.   Normal exam today. No lightheadedness on exam. Able to drink fluids, working on staying hydrated.   Will obtain CBC, stool culture, covid and CMP.   Patient post marrow transplant with history of multiple myeloma and has spoke with oncology nurse about symptoms.     Recommend IV fluids if worsening symptoms, develop lightheadedness.   Discussed clear fluids, BRAT diet.     Return if symptoms worsen or fail to improve, for in 3-5 days.     At the end of the encounter, I discussed results, diagnosis, medications. Discussed red flags for immediate return to clinic/ER, as well as indications for follow up if no improvement. Patient understood and agreed to plan. Patient was stable for discharge.        Vianey Posadas PA-C on 3/1/2023 at 11:59 AM          Subjective:     HPI:    June is a 52 year old male who presents to clinic today for the following health issues:  Chief Complaint   Patient presents with     Urgent Care     Diarrhea     Per patient states for the past three days he has been having diarrhea, about 9-12 bowels per days, bowels are watery, explosive with chills and dehydrated. Patient has been having decreased due to everyt meredith he eats he needs to use the restroom and also abdominal cramps.      HPI    Patient with diarrhea for 3 days, 9-12 bouts a day. Has had 4 bouts so far today. Having some chills. Feeling dehydrated. Having low energy. Has developed mild, dry cough, congestion and runny nose.    Hasn't done home covid test. No known exposure to anything.   Drinking Gatorade, vitamin water, tea. Not eating. Urinated this morning at 7am.   No recent antibiotic use.   Spoke with nurse triage from oncology. Recommended possible IVF and labs, along with covid test.   Travel to [place] for conference.     On revlimid. Has once a month infusions- Darzelex.     PMH: Multiple myeloma, 1 year post bone marrow transplant. In remission.     History obtained from the patient.    Review of Systems   Constitutional: Positive for fatigue. Negative for fever.   HENT: Positive for congestion and rhinorrhea (mild last night).    Respiratory: Positive for cough (dry x 1 day). Negative for shortness of breath and wheezing.    Gastrointestinal: Positive for diarrhea and nausea. Negative for abdominal pain, blood in stool and vomiting.   Neurological: Negative for dizziness.         Patient Active Problem List   Diagnosis     CARDIOVASCULAR SCREENING; LDL GOAL LESS THAN 100     FH: heart disease     Overweight (BMI 25.0-29.9)     Hyperlipidemia LDL goal <100     Concussion without loss of consciousness, initial encounter     Concussion without loss of consciousness, subsequent encounter     Hypertension     CAD (coronary artery disease)     Pre-diabetes     Increased thyroid stimulating hormone (TSH) level     Increased liver enzymes     Hepatitis A antibody positive     Hypothyroidism due to Hashimoto's thyroiditis     Multiple myeloma not having achieved remission (H)     Keratoconus, stable condition     Autologous donor, stem cells     Febrile neutropenia (H)     Neutropenic fever (H)     History of peripheral stem cell transplant (H)     Malignant neoplasm of thyroid gland (H)     Thrombocytopenia, unspecified (H)     Hypogammaglobulinemia (H)        Past Medical History:   Diagnosis Date     CAD (coronary artery disease)     s/p stent to CFX     Heart attack (H)      Hyperlipidemia LDL goal <100      Hypertension       Stented coronary artery      Thyroid disease        Social History     Tobacco Use     Smoking status: Never     Smokeless tobacco: Never   Substance Use Topics     Alcohol use: Not Currently             Objective:     Vitals:    03/01/23 1146   BP: 130/85   Pulse: 66   Resp: 18   Temp: 97.8  F (36.6  C)   TempSrc: Tympanic   SpO2: 100%   Weight: 67.6 kg (149 lb)         Physical Exam   EXAM:   Pleasant, alert, appropriate appearance. NAD.  Head Exam: Normocephalic, atraumatic.  Eye Exam:  non icteric/injection.    Ear Exam: TMs grey without bulging. Normal canals.  Normal pinna.  Nose Exam: Normal external nose.    OroPharynx Exam:  Moist mucous membranes. No erythema, pharynx without exudate or hypertrophy.  Neck/Thyroid Exam:  No LAD.    Chest/Respiratory Exam: CTAB.  Cardiovascular Exam: RRR. No murmur or rubs.  ABD: soft, Non-tender, normal bowel sounds, no rebound/guarding.  No masses/organomegaly.  Ext/musculoskeletal: Grossly intact, No edema  Neuro: CN II-XII intact grossly intact.  Skin: no rash or lesion.      Results:  Results for orders placed or performed in visit on 03/01/23   CBC with platelets and differential     Status: None ()    Narrative    The following orders were created for panel order CBC with platelets and differential.  Procedure                               Abnormality         Status                     ---------                               -----------         ------                     CBC with platelets and d...[[id number]]                                                   Please view results for these tests on the individual orders.

## 2023-03-02 LAB — SARS-COV-2 RNA RESP QL NAA+PROBE: NEGATIVE

## 2023-03-07 ENCOUNTER — TELEPHONE (OUTPATIENT)
Dept: ONCOLOGY | Facility: CLINIC | Age: 53
End: 2023-03-07
Payer: COMMERCIAL

## 2023-03-07 NOTE — TELEPHONE ENCOUNTER
Oral Chemotherapy Monitoring Program   Medication: Revlimid  Rx: 10mg PO daily on days 1 through 28 of 28 day cycle   Auth #: [id number]   Risk Category: Adult Male  Routine survey questions reviewed.   Rx to be Escribed to HUGO Goldberg  [facility]  Oncology Pharmacy Liaison   [email or web address]  [phone] (phone)  [phone] (fax)

## 2023-03-12 NOTE — PROGRESS NOTES
Oncologic Hx:   - 4/30/2021 M spike of 34.8 in urine.M spike in blood 0.2; IgG 702 with depressed IgA and IgM. Beta 2 microglobulin 2.7. Lambda  with K/L ratio of 0.01. WBC 11.1 with absolute eos of 1.0. hgb, plt, Cr (1.1), and albumin WNL.    -4/30/2021 CT abd/pelvis showed sclerotic marrow changes with numerous lytic appearing lesions throughout the imaged portions of the spine, pelvis and ribs.      -PET scan 5/11/2021 Numerous osteolytic lesions which predominantly demonstrate uptake below liver background levels. There is one intraosseous lesion in the left lateral 9th rib that demonstrates uptake above background, possibly due to nondisplaced fracture. Adjacent to this lesion is mild  hypermetabolism to the left pleura, with new small left pleural effusion, suspicious for malignant effusion versus posttraumatic effusion. Pleural uptake may represent extramedullary myeloma extending along the intercostal space versus inflammation.    - BM bx 5/17/2021 with flow showing 4.0% plasma cells which express CD19 (dim), CD38 (bright), CD45 (dim), , and monotypic cytoplasmic lambda immunoglobulin light chains but lack CD20 and CD56.  Hypercellular bone marrow for age (approximately 75% cellularity) with adequate trilineage hematopoiesis. Plasma cell infiltrate: Interstitial, lambda monotypic and representing approximately 60% the bone marrow cellularity, by  immunohistochemical stain. Cytpgenetics with 2 related hypodiploid clones. One with loss of Y, monosomy 13 and 14 (5%) and one with translocation of 8p and 14, derivative 16 with long arm replaced by extra copy 1q,monosomy 21. FISH showed gain of 1q, loss of 13 and 14, IGH-MYC fusion t(8:14)    - Started Miracle-RVd 21 day with velcade on days 1,8,15.     -Cycle 2 Miracle-RVd. Dose reduce dex to 80 mg d/t fatigue and stomach upset on dex days. Also having cough with basilar streaking on CXR    - 8/31/2021 BM bx -rare suspicious plasma cells in touch  "imprint. No increase in plasma cells by morph.    -8/31/2021 PET/CT Diffuse osteolytic lesions throughout the axial and appendicular skeleton. Increased sclerosis since prior exam 5/11/2021 without increased metabolism or size.  Hypermetabolic pretracheal lymph node as well as hypermetabolic level 2 lymph nodes within the right neck. These lymph nodes are likely reactive from autoimmune activation via medical therapy. Hypermetabolic subcentimeter nodules within the thyroid gland    - 11/11/2021 Autologous BMT    - 2/21/22 started maintenance with Revlimid 10 mg daily and daratumumab monthly; did Revlimid alone for C1 due to low IgG levels, added daratumumab starting C2    Interval Hx:   June presents to clinic prior to daratumumab and zometa today. Had significant diarrhea about 1.5 weeks ago, was evaluated in urgent care, all stool testing and COVID were neg. He does report his daughter has a respiratory viral illness, states \"the report said it was probably COVID but could be type of flu.\"  She doesn't have as many GI symptoms, but did describe some stomach discomfort one day. Regardless, his diarrhea has resolved and his bowel patterns are back to baseline.  He remained afebrile throughout his illness.  Has an intermittent nonproductive cough and a mild 2/10 headache on the right side today. No new pain.       ROS: 10 point ROS neg other than the symptoms noted above in the HPI.      Physical Exam:  /80   Pulse 56   Temp 98.8  F (37.1  C) (Oral)   Resp 18   Wt 68.4 kg (150 lb 12.8 oz)   SpO2 98%   BMI 25.09 kg/m      Constitutional: NAD  Eyes: no scleral icterus  ENT: no oral lesions  Lymph: no cervical, supraclavicular, axillary LAD  Pulm: CTAB  CV: RRR, no m/r/g  GI: bowel sounds present, soft and nontender to palpation  MSK: No edema in the extremities  Neuro: alert, conversant, normal gait  Psych: appropriate mood and affect    Labs:  I personally reviewed the following labs:    Most Recent " 3 CBC's:  Recent Labs   Lab Test 03/13/23  1007 03/01/23  1333 02/13/23  1035   WBC 7.3 5.5 3.5*   HGB 11.7* 12.9* 10.8*   MCV 91 96 93    322 254     Most Recent 3 BMP's:  Recent Labs   Lab Test 03/13/23  1007 03/01/23  1333 02/13/23  1035   * 136 139   POTASSIUM 3.9 4.8 4.0   CHLORIDE 100 105 107   CO2 22 14* 23   BUN 12.0 10.7 11.5   CR 1.13 1.17 1.32*   ANIONGAP 12 17* 9   HARDEEP 9.1 8.4* 8.6   * 91 130*     Most Recent 2 LFT's:  Recent Labs   Lab Test 03/13/23  1007 03/01/23  1333   AST 28 30   ALT 15 14   ALKPHOS 78 57   BILITOTAL 0.3 0.3       Assessment and Plan  Logan has multiple myeloma with high risk cytogenetics. He got Miracle-RVD with VGPR, then underwent autologous transplant 11/11/21. Continue acyclovir daily for viral ppx and bactrim M/T for PJP ppx. Given his high risk cytogenetics he started miracle + revlimid maintenance therapy 2/21/22. Follow IgG and replete if it remains <300. He had severe flu-like effects from his first dose of Zometa but he has been tolerating monthly Zometa dosed at 3 mg. Continue Vit D. Continue B vitamin supplement to help his neuropathy.   - will proceed with miracle + revlimid today  - Will proceed with Zometa today     -Ronny given 10/11/22, no longer giving any additional doses  - Influenza vaccine 1/16/23  - BMT vaccines as directed by BMT    Elevated serum creatinine  Creatinine has been slightly elevated above baseline in the past, likely in the setting of OTC decongestant use. He stopped decongestants and creatinine has recovered to his baseline of 1.0-1.1.    Hypogammaglobulinemia Start monthly IV Ig whenever IgG <300 (consider increasing to < 400 due to recurrent infections; awaiting today's result)    Normocytic Anemia: likely d/t revlimid and daratumumab. Will keep monitoring and dose reduce if needed.      30 minutes spent on the date of the encounter doing chart review, review of test results, patient visit and documentation     Laura Ballesteros,  PATTY TIERNEY  [facility]  [address], [place] [address] [phone]

## 2023-03-13 ENCOUNTER — APPOINTMENT (OUTPATIENT)
Dept: LAB | Facility: CLINIC | Age: 53
End: 2023-03-13
Attending: INTERNAL MEDICINE
Payer: COMMERCIAL

## 2023-03-13 ENCOUNTER — ONCOLOGY VISIT (OUTPATIENT)
Dept: ONCOLOGY | Facility: CLINIC | Age: 53
End: 2023-03-13
Attending: INTERNAL MEDICINE
Payer: COMMERCIAL

## 2023-03-13 ENCOUNTER — INFUSION THERAPY VISIT (OUTPATIENT)
Dept: ONCOLOGY | Facility: CLINIC | Age: 53
End: 2023-03-13
Attending: STUDENT IN AN ORGANIZED HEALTH CARE EDUCATION/TRAINING PROGRAM
Payer: COMMERCIAL

## 2023-03-13 VITALS
DIASTOLIC BLOOD PRESSURE: 80 MMHG | TEMPERATURE: 98.8 F | OXYGEN SATURATION: 98 % | HEART RATE: 56 BPM | RESPIRATION RATE: 18 BRPM | SYSTOLIC BLOOD PRESSURE: 133 MMHG | WEIGHT: 150.8 LBS | BODY MASS INDEX: 25.09 KG/M2

## 2023-03-13 DIAGNOSIS — R79.89 ELEVATED SERUM CREATININE: ICD-10-CM

## 2023-03-13 DIAGNOSIS — C90.00 MULTIPLE MYELOMA NOT HAVING ACHIEVED REMISSION (H): Primary | ICD-10-CM

## 2023-03-13 DIAGNOSIS — D80.1 HYPOGAMMAGLOBULINEMIA (H): ICD-10-CM

## 2023-03-13 LAB
ALBUMIN SERPL BCG-MCNC: 4.2 G/DL (ref 3.5–5.2)
ALP SERPL-CCNC: 78 U/L (ref 40–129)
ALT SERPL W P-5'-P-CCNC: 15 U/L (ref 10–50)
ANION GAP SERPL CALCULATED.3IONS-SCNC: 12 MMOL/L (ref 7–15)
AST SERPL W P-5'-P-CCNC: 28 U/L (ref 10–50)
BASOPHILS # BLD MANUAL: 0 10E3/UL (ref 0–0.2)
BASOPHILS NFR BLD MANUAL: 0 %
BILIRUB SERPL-MCNC: 0.3 MG/DL
BUN SERPL-MCNC: 12 MG/DL (ref 6–20)
BURR CELLS BLD QL SMEAR: ABNORMAL
CALCIUM SERPL-MCNC: 9.1 MG/DL (ref 8.6–10)
CHLORIDE SERPL-SCNC: 100 MMOL/L (ref 98–107)
CREAT SERPL-MCNC: 1.13 MG/DL (ref 0.67–1.17)
DEPRECATED HCO3 PLAS-SCNC: 22 MMOL/L (ref 22–29)
ELLIPTOCYTES BLD QL SMEAR: ABNORMAL
EOSINOPHIL # BLD MANUAL: 0.6 10E3/UL (ref 0–0.7)
EOSINOPHIL NFR BLD MANUAL: 8 %
ERYTHROCYTE [DISTWIDTH] IN BLOOD BY AUTOMATED COUNT: 14.9 % (ref 10–15)
FRAGMENTS BLD QL SMEAR: SLIGHT
GFR SERPL CREATININE-BSD FRML MDRD: 77 ML/MIN/1.73M2
GLUCOSE SERPL-MCNC: 139 MG/DL (ref 70–99)
HCT VFR BLD AUTO: 35.6 % (ref 40–53)
HGB BLD-MCNC: 11.7 G/DL (ref 13.3–17.7)
IGA SERPL-MCNC: 45 MG/DL (ref 84–499)
IGG SERPL-MCNC: 335 MG/DL (ref 610–1616)
IGM SERPL-MCNC: 18 MG/DL (ref 35–242)
KAPPA LC FREE SER-MCNC: 0.78 MG/DL (ref 0.33–1.94)
KAPPA LC FREE/LAMBDA FREE SER NEPH: 1.3 {RATIO} (ref 0.26–1.65)
LAMBDA LC FREE SERPL-MCNC: 0.6 MG/DL (ref 0.57–2.63)
LYMPHOCYTES # BLD MANUAL: 1.8 10E3/UL (ref 0.8–5.3)
LYMPHOCYTES NFR BLD MANUAL: 25 %
MCH RBC QN AUTO: 29.8 PG (ref 26.5–33)
MCHC RBC AUTO-ENTMCNC: 32.9 G/DL (ref 31.5–36.5)
MCV RBC AUTO: 91 FL (ref 78–100)
MONOCYTES # BLD MANUAL: 0.1 10E3/UL (ref 0–1.3)
MONOCYTES NFR BLD MANUAL: 2 %
NEUTROPHILS # BLD MANUAL: 4.7 10E3/UL (ref 1.6–8.3)
NEUTROPHILS NFR BLD MANUAL: 65 %
PLAT MORPH BLD: ABNORMAL
PLATELET # BLD AUTO: 344 10E3/UL (ref 150–450)
POTASSIUM SERPL-SCNC: 3.9 MMOL/L (ref 3.4–5.3)
PROT SERPL-MCNC: 6.3 G/DL (ref 6.4–8.3)
RBC # BLD AUTO: 3.93 10E6/UL (ref 4.4–5.9)
RBC MORPH BLD: ABNORMAL
SODIUM SERPL-SCNC: 134 MMOL/L (ref 136–145)
TOTAL PROTEIN SERUM FOR ELP: 6 G/DL (ref 6.4–8.3)
WBC # BLD AUTO: 7.3 10E3/UL (ref 4–11)

## 2023-03-13 PROCEDURE — 83521 IG LIGHT CHAINS FREE EACH: CPT

## 2023-03-13 PROCEDURE — 250N000011 HC RX IP 250 OP 636: Mod: JW | Performed by: STUDENT IN AN ORGANIZED HEALTH CARE EDUCATION/TRAINING PROGRAM

## 2023-03-13 PROCEDURE — 85007 BL SMEAR W/DIFF WBC COUNT: CPT

## 2023-03-13 PROCEDURE — 999N000248 HC STATISTIC IV INSERT WITH US BY RN

## 2023-03-13 PROCEDURE — 84155 ASSAY OF PROTEIN SERUM: CPT

## 2023-03-13 PROCEDURE — 86334 IMMUNOFIX E-PHORESIS SERUM: CPT | Mod: 26

## 2023-03-13 PROCEDURE — 86334 IMMUNOFIX E-PHORESIS SERUM: CPT | Performed by: PATHOLOGY

## 2023-03-13 PROCEDURE — 84165 PROTEIN E-PHORESIS SERUM: CPT | Mod: TC | Performed by: PATHOLOGY

## 2023-03-13 PROCEDURE — 258N000003 HC RX IP 258 OP 636: Performed by: STUDENT IN AN ORGANIZED HEALTH CARE EDUCATION/TRAINING PROGRAM

## 2023-03-13 PROCEDURE — 80053 COMPREHEN METABOLIC PANEL: CPT

## 2023-03-13 PROCEDURE — 96365 THER/PROPH/DIAG IV INF INIT: CPT

## 2023-03-13 PROCEDURE — 36415 COLL VENOUS BLD VENIPUNCTURE: CPT

## 2023-03-13 PROCEDURE — G0463 HOSPITAL OUTPT CLINIC VISIT: HCPCS | Mod: 25 | Performed by: REGISTERED NURSE

## 2023-03-13 PROCEDURE — 96401 CHEMO ANTI-NEOPL SQ/IM: CPT

## 2023-03-13 PROCEDURE — 85027 COMPLETE CBC AUTOMATED: CPT

## 2023-03-13 PROCEDURE — 84165 PROTEIN E-PHORESIS SERUM: CPT | Mod: 26

## 2023-03-13 PROCEDURE — 82784 ASSAY IGA/IGD/IGG/IGM EACH: CPT

## 2023-03-13 PROCEDURE — 99214 OFFICE O/P EST MOD 30 MIN: CPT | Performed by: REGISTERED NURSE

## 2023-03-13 RX ORDER — LENALIDOMIDE 10 MG/1
10 CAPSULE ORAL DAILY
Qty: 28 CAPSULE | Refills: 0 | Status: SHIPPED | OUTPATIENT
Start: 2023-03-13 | End: 2023-04-11

## 2023-03-13 RX ADMIN — ZOLEDRONIC ACID 3 MG: 4 INJECTION, SOLUTION, CONCENTRATE INTRAVENOUS at 11:19

## 2023-03-13 RX ADMIN — DARATUMUMAB AND HYALURONIDASE-FIHJ (HUMAN RECOMBINANT) 1800 MG: 1800; 30000 INJECTION SUBCUTANEOUS at 11:39

## 2023-03-13 RX ADMIN — SODIUM CHLORIDE 250 ML: 9 INJECTION, SOLUTION INTRAVENOUS at 11:19

## 2023-03-13 ASSESSMENT — PAIN SCALES - GENERAL: PAINLEVEL: MILD PAIN (3)

## 2023-03-13 NOTE — NURSING NOTE
Chief Complaint   Patient presents with     Oncology Clinic Visit     Multiple myeloma not having achieved remission       Blood Draw     Labs drawn via piv placed by VAT. VS taken.     Labs drawn from PIV placed by VAT. Line flushed with saline. Vitals taken. Pt checked in for appointment(s).    Vasile Gorman RN

## 2023-03-13 NOTE — LETTER
3/13/2023         RE: June Ronquillo  [address]  [facility] [address]        Dear Colleague,    Thank you for referring your patient, June Ronquillo, to the [facility]. Please see a copy of my visit note below.    Oncologic Hx:   - 4/30/2021 M spike of 34.8 in urine.M spike in blood 0.2; IgG 702 with depressed IgA and IgM. Beta 2 microglobulin 2.7. Lambda  with K/L ratio of 0.01. WBC 11.1 with absolute eos of 1.0. hgb, plt, Cr (1.1), and albumin WNL.    -4/30/2021 CT abd/pelvis showed sclerotic marrow changes with numerous lytic appearing lesions throughout the imaged portions of the spine, pelvis and ribs.      -PET scan 5/11/2021 Numerous osteolytic lesions which predominantly demonstrate uptake below liver background levels. There is one intraosseous lesion in the left lateral 9th rib that demonstrates uptake above background, possibly due to nondisplaced fracture. Adjacent to this lesion is mild  hypermetabolism to the left pleura, with new small left pleural effusion, suspicious for malignant effusion versus posttraumatic effusion. Pleural uptake may represent extramedullary myeloma extending along the intercostal space versus inflammation.    - BM bx 5/17/2021 with flow showing 4.0% plasma cells which express CD19 (dim), CD38 (bright), CD45 (dim), , and monotypic cytoplasmic lambda immunoglobulin light chains but lack CD20 and CD56.  Hypercellular bone marrow for age (approximately 75% cellularity) with adequate trilineage hematopoiesis. Plasma cell infiltrate: Interstitial, lambda monotypic and representing approximately 60% the bone marrow cellularity, by  immunohistochemical stain. Cytpgenetics with 2 related hypodiploid clones. One with loss of Y, monosomy 13 and 14 (5%) and one with translocation of 8p and 14, derivative 16 with long arm replaced by extra copy 1q,monosomy 21. FISH showed gain of 1q, loss of 13 and 14, IGH-MYC fusion  "t(8:14)    - Started Miracle-RVd 21 day with velcade on days 1,8,15.     -Cycle 2 Miracle-RVd. Dose reduce dex to 80 mg d/t fatigue and stomach upset on dex days. Also having cough with basilar streaking on CXR    - 8/31/2021 BM bx -rare suspicious plasma cells in touch imprint. No increase in plasma cells by morph.    -8/31/2021 PET/CT Diffuse osteolytic lesions throughout the axial and appendicular skeleton. Increased sclerosis since prior exam 5/11/2021 without increased metabolism or size.  Hypermetabolic pretracheal lymph node as well as hypermetabolic level 2 lymph nodes within the right neck. These lymph nodes are likely reactive from autoimmune activation via medical therapy. Hypermetabolic subcentimeter nodules within the thyroid gland    - 11/11/2021 Autologous BMT    - 2/21/22 started maintenance with Revlimid 10 mg daily and daratumumab monthly; did Revlimid alone for C1 due to low IgG levels, added daratumumab starting C2    Interval Hx:   June presents to clinic prior to daratumumab and zometa today. Had significant diarrhea about 1.5 weeks ago, was evaluated in urgent care, all stool testing and COVID were neg. He does report his daughter has a respiratory viral illness, states \"the report said it was probably COVID but could be type of flu.\"  She doesn't have as many GI symptoms, but did describe some stomach discomfort one day. Regardless, his diarrhea has resolved and his bowel patterns are back to baseline.  He remained afebrile throughout his illness.  Has an intermittent nonproductive cough and a mild 2/10 headache on the right side today. No new pain.       ROS: 10 point ROS neg other than the symptoms noted above in the HPI.      Physical Exam:  /80   Pulse 56   Temp 98.8  F (37.1  C) (Oral)   Resp 18   Wt 68.4 kg (150 lb 12.8 oz)   SpO2 98%   BMI 25.09 kg/m      Constitutional: NAD  Eyes: no scleral icterus  ENT: no oral lesions  Lymph: no cervical, supraclavicular, axillary " LAD  Pulm: CTAB  CV: RRR, no m/r/g  GI: bowel sounds present, soft and nontender to palpation  MSK: No edema in the extremities  Neuro: alert, conversant, normal gait  Psych: appropriate mood and affect    Labs:  I personally reviewed the following labs:    Most Recent 3 CBC's:  Recent Labs   Lab Test 03/13/23  1007 03/01/23  1333 02/13/23  1035   WBC 7.3 5.5 3.5*   HGB 11.7* 12.9* 10.8*   MCV 91 96 93    322 254     Most Recent 3 BMP's:  Recent Labs   Lab Test 03/13/23  1007 03/01/23  1333 02/13/23  1035   * 136 139   POTASSIUM 3.9 4.8 4.0   CHLORIDE 100 105 107   CO2 22 14* 23   BUN 12.0 10.7 11.5   CR 1.13 1.17 1.32*   ANIONGAP 12 17* 9   HARDEEP 9.1 8.4* 8.6   * 91 130*     Most Recent 2 LFT's:  Recent Labs   Lab Test 03/13/23  1007 03/01/23  1333   AST 28 30   ALT 15 14   ALKPHOS 78 57   BILITOTAL 0.3 0.3       Assessment and Plan  Logan has multiple myeloma with high risk cytogenetics. He got Miracle-RVD with VGPR, then underwent autologous transplant 11/11/21. Continue acyclovir daily for viral ppx and bactrim M/T for PJP ppx. Given his high risk cytogenetics he started miracle + revlimid maintenance therapy 2/21/22. Follow IgG and replete if it remains <300. He had severe flu-like effects from his first dose of Zometa but he has been tolerating monthly Zometa dosed at 3 mg. Continue Vit D. Continue B vitamin supplement to help his neuropathy.   - will proceed with miracle + revlimid today  - Will proceed with Zometa today     -Haoeld given 10/11/22, no longer giving any additional doses  - Influenza vaccine 1/16/23  - BMT vaccines as directed by BMT    Elevated serum creatinine  Creatinine has been slightly elevated above baseline in the past, likely in the setting of OTC decongestant use. He stopped decongestants and creatinine has recovered to his baseline of 1.0-1.1.    Hypogammaglobulinemia Start monthly IV Ig whenever IgG <300 (consider increasing to < 400 due to recurrent infections; awaiting  today's result)    Normocytic Anemia: likely d/t revlimid and daratumumab. Will keep monitoring and dose reduce if needed.      30 minutes spent on the date of the encounter doing chart review, review of test results, patient visit and documentation     PATTY Meza [facility] [address] [id number] [phone]

## 2023-03-13 NOTE — PROGRESS NOTES
Infusion Nursing Note:  June Ronquillo presents today for Cycle 15, Day 1 Darzalex Faspro injection, Zometa infusion.    Patient seen by provider today: Yes: Lesli Sanchez CNP   present during visit today: Not Applicable.    Note: N/A.    Intravenous Access:  Peripheral IV placed.    Treatment Conditions:  Lab Results   Component Value Date    HGB 11.7 (L) 03/13/2023    WBC 7.3 03/13/2023    ANEU 4.7 03/13/2023    ANEUTAUTO 2.4 03/01/2023     03/13/2023      Lab Results   Component Value Date     (L) 03/13/2023    POTASSIUM 3.9 03/13/2023    MAG 1.9 11/10/2022    CR 1.13 03/13/2023    HARDEEP 9.1 03/13/2023    BILITOTAL 0.3 03/13/2023    ALBUMIN 4.2 03/13/2023    ALT 15 03/13/2023    AST 28 03/13/2023     Results reviewed, labs MET treatment parameters, ok to proceed with treatment.    Post Infusion Assessment:  Patient tolerated infusion without incident.  Patient tolerated injection to left lower abdomen without incident.    Blood return noted pre and post infusion.  Site patent and intact, free from redness, edema or discomfort.  No evidence of extravasations.  Access discontinued per protocol.     Discharge Plan:   Patient declined prescription refills.  AVS to patient via [employer].  Patient will return 4/11/23 for next appointment.   Patient discharged in stable condition accompanied by: self.  Departure Mode: Ambulatory.      Ema Bowden RN

## 2023-03-13 NOTE — NURSING NOTE
"Oncology Rooming Note    March 13, 2023 10:19 AM   June Ronquillo is a 52 year old male who presents for:    Chief Complaint   Patient presents with     Oncology Clinic Visit     Multiple myeloma not having achieved remission       Blood Draw     Labs drawn via piv placed by VAT. VS taken.     Initial Vitals: /80   Pulse 56   Temp 98.8  F (37.1  C) (Oral)   Resp 18   Wt 68.4 kg (150 lb 12.8 oz)   SpO2 98%   BMI 25.09 kg/m   Estimated body mass index is 25.09 kg/m  as calculated from the following:    Height as of 1/16/23: 1.651 m (5' 5\").    Weight as of this encounter: 68.4 kg (150 lb 12.8 oz). Body surface area is 1.77 meters squared.  Mild Pain (3) Comment: Data Unavailable   No LMP for male patient.  Allergies reviewed: Yes  Medications reviewed: Yes    Medications: Medication refills not needed today.  Pharmacy name entered into [employer] Card:    [facility] [place], [place] - [phone]  [facility] - [place], [place] - [address] [place] [address] [address]VE [facility] [place], [place] - [phone]  [facility] [place], [place] - [address] [facility]    Clinical concerns: No new clinical concerns other than reason for visit today.     Radha Aragon, EMT            "

## 2023-03-14 LAB
ALBUMIN SERPL ELPH-MCNC: 3.7 G/DL (ref 3.7–5.1)
ALPHA1 GLOB SERPL ELPH-MCNC: 0.4 G/DL (ref 0.2–0.4)
ALPHA2 GLOB SERPL ELPH-MCNC: 0.8 G/DL (ref 0.5–0.9)
B-GLOBULIN SERPL ELPH-MCNC: 0.7 G/DL (ref 0.6–1)
GAMMA GLOB SERPL ELPH-MCNC: 0.3 G/DL (ref 0.7–1.6)
M PROTEIN SERPL ELPH-MCNC: 0.1 G/DL
PROT PATTERN SERPL ELPH-IMP: ABNORMAL
PROT PATTERN SERPL IFE-IMP: NORMAL

## 2023-03-17 ENCOUNTER — TELEPHONE (OUTPATIENT)
Dept: ONCOLOGY | Facility: CLINIC | Age: 53
End: 2023-03-17
Payer: COMMERCIAL

## 2023-03-17 DIAGNOSIS — C90.00 MULTIPLE MYELOMA NOT HAVING ACHIEVED REMISSION (H): ICD-10-CM

## 2023-03-17 RX ORDER — ACYCLOVIR 800 MG/1
TABLET ORAL
Qty: 180 TABLET | Refills: 1 | Status: ON HOLD | OUTPATIENT
Start: 2023-03-17 | End: 2023-04-21

## 2023-03-17 NOTE — TELEPHONE ENCOUNTER
"Acyclovir 800mg tab  Last prescribing provider: Laura Ballesteros on 9/13/22    Last clinic visit date: 3/13/23    Recommendations for requested medication (if none, N/A): 3/13/23, \"Continue acyclovir daily for viral ppx and bactrim M/T for PJP ppx\"    Any other pertinent information (if none, N/A): NA    Refilled: Y/N, if NO, why?    Routed to Laura Ballesteros.     "

## 2023-03-17 NOTE — TELEPHONE ENCOUNTER
PA Initiation    Medication: Revlimid - APPROVED  Insurance Company: [employer] [phone] Fax [phone]  Pharmacy Filling the Rx:    Filling Pharmacy Phone:    Filling Pharmacy Fax:    Start Date: 3/14/2023    Prior Authorization Approval    Authorization Effective Date: 3/17/2023  Authorization Expiration Date: 3/17/2024  Medication: Revlimid - APPROVED  Approved Dose/Quantity:   Reference #:     Insurance Company: [employer] [phone] Fax [phone]  Expected CoPay:       CoPay Card Available:      Foundation Assistance Needed:    Which Pharmacy is filling the prescription (Not needed for infusion/clinic administered):    Pharmacy Notified:    Patient Notified:          Yu Goldberg CPhTOncologjeb Pharmacy Liaison     [facility] [address] [address]  Office: [phone]  Fax: [phone]  [email or web address]

## 2023-04-01 ENCOUNTER — HEALTH MAINTENANCE LETTER (OUTPATIENT)
Age: 53
End: 2023-04-01

## 2023-04-04 ENCOUNTER — TELEPHONE (OUTPATIENT)
Dept: PULMONOLOGY | Facility: CLINIC | Age: 53
End: 2023-04-04
Payer: COMMERCIAL

## 2023-04-04 DIAGNOSIS — R94.2 ABNORMAL PFT: Primary | ICD-10-CM

## 2023-04-04 NOTE — TELEPHONE ENCOUNTER
Spoke with pt as he is scheduled 4/18 with Dr. Waddell as a new pt but per chart review, based on diagnosis, patient would be more appropriate to be seen by Dr. Quevedo in his afternoon clinic (verified by Dr. Quevedo). As instructed by Dr. Quevedo, pt is in need of a CT and repeat FPFT. Offered pt appt on 4/13, but pt cannot make it. Offered 4/27 and he is agreeable. Discussed that CXR on 4/11 would be cancelled in lieu of CT scheduled on 4/27 prior to appt with Dr. Quevedo and FPFT would also be arranged that day. He is agreeable and details confirmed with pt (he voiced understanding that CXR and appt with Dr. Waddell would be cancelled).

## 2023-04-04 NOTE — TELEPHONE ENCOUNTER
RECORDS RECEIVED FROM: internal      DATE RECEIVED: 4.27.23     NOTES STATUS DETAILS   OFFICE NOTE from referring provider internal  Barbi Vazquez MD   OFFICE NOTE from other specialist       DISCHARGE SUMMARY from hospital       DISCHARGE REPORT from the ER internal  11.20.21 Brenda  [facility]   5/17/21 Regalado   MEDICATION LIST internal      IMAGING  (NEED IMAGES AND REPORTS)       CT SCAN internal  12.5.22, 8.31.21, 5.11.21    Scheduled 4.27.23    CHEST XRAY (CXR) internal  11.19.22, 8/18/22,   PET- 12.5.22, 2.9.22, 11.20.21, 10.18.21 more in epic      TESTS       PULMONARY FUNCTION TESTING (PFT) internal  12.19.22, 10.13.21    Scheduled 4.27.23

## 2023-04-10 DIAGNOSIS — C90.00 MULTIPLE MYELOMA NOT HAVING ACHIEVED REMISSION (H): Primary | ICD-10-CM

## 2023-04-10 RX ORDER — ACETAMINOPHEN 325 MG/1
650 TABLET ORAL ONCE
Status: CANCELLED
Start: 2023-05-15 | End: 2023-05-15

## 2023-04-10 RX ORDER — DIPHENHYDRAMINE HYDROCHLORIDE 50 MG/ML
50 INJECTION INTRAMUSCULAR; INTRAVENOUS
Status: CANCELLED
Start: 2023-05-15

## 2023-04-10 RX ORDER — HEPARIN SODIUM,PORCINE 10 UNIT/ML
5 VIAL (ML) INTRAVENOUS
Status: CANCELLED | OUTPATIENT
Start: 2023-05-15

## 2023-04-10 RX ORDER — MEPERIDINE HYDROCHLORIDE 25 MG/ML
25 INJECTION INTRAMUSCULAR; INTRAVENOUS; SUBCUTANEOUS EVERY 30 MIN PRN
Status: CANCELLED | OUTPATIENT
Start: 2023-05-15

## 2023-04-10 RX ORDER — METHYLPREDNISOLONE SODIUM SUCCINATE 125 MG/2ML
125 INJECTION, POWDER, LYOPHILIZED, FOR SOLUTION INTRAMUSCULAR; INTRAVENOUS
Status: CANCELLED
Start: 2023-05-15

## 2023-04-10 RX ORDER — DIPHENHYDRAMINE HCL 25 MG
50 CAPSULE ORAL ONCE
Status: CANCELLED
Start: 2023-05-15 | End: 2023-05-15

## 2023-04-10 RX ORDER — DEXAMETHASONE 4 MG/1
12 TABLET ORAL ONCE
Status: CANCELLED
Start: 2023-05-15 | End: 2023-05-15

## 2023-04-10 RX ORDER — NALOXONE HYDROCHLORIDE 0.4 MG/ML
0.2 INJECTION, SOLUTION INTRAMUSCULAR; INTRAVENOUS; SUBCUTANEOUS
Status: CANCELLED | OUTPATIENT
Start: 2023-05-15

## 2023-04-10 RX ORDER — EPINEPHRINE 1 MG/ML
0.3 INJECTION, SOLUTION INTRAMUSCULAR; SUBCUTANEOUS EVERY 5 MIN PRN
Status: CANCELLED | OUTPATIENT
Start: 2023-05-15

## 2023-04-10 RX ORDER — HEPARIN SODIUM (PORCINE) LOCK FLUSH IV SOLN 100 UNIT/ML 100 UNIT/ML
5 SOLUTION INTRAVENOUS
Status: CANCELLED | OUTPATIENT
Start: 2023-05-15

## 2023-04-10 RX ORDER — ALBUTEROL SULFATE 90 UG/1
1-2 AEROSOL, METERED RESPIRATORY (INHALATION)
Status: CANCELLED
Start: 2023-05-15

## 2023-04-10 RX ORDER — ALBUTEROL SULFATE 0.83 MG/ML
2.5 SOLUTION RESPIRATORY (INHALATION)
Status: CANCELLED | OUTPATIENT
Start: 2023-05-15

## 2023-04-11 ENCOUNTER — ONCOLOGY VISIT (OUTPATIENT)
Dept: ONCOLOGY | Facility: CLINIC | Age: 53
End: 2023-04-11
Attending: REGISTERED NURSE
Payer: COMMERCIAL

## 2023-04-11 ENCOUNTER — INFUSION THERAPY VISIT (OUTPATIENT)
Dept: ONCOLOGY | Facility: CLINIC | Age: 53
End: 2023-04-11
Attending: STUDENT IN AN ORGANIZED HEALTH CARE EDUCATION/TRAINING PROGRAM
Payer: COMMERCIAL

## 2023-04-11 ENCOUNTER — PRE VISIT (OUTPATIENT)
Dept: PULMONOLOGY | Facility: CLINIC | Age: 53
End: 2023-04-11
Payer: COMMERCIAL

## 2023-04-11 ENCOUNTER — TELEPHONE (OUTPATIENT)
Dept: ONCOLOGY | Facility: CLINIC | Age: 53
End: 2023-04-11

## 2023-04-11 ENCOUNTER — APPOINTMENT (OUTPATIENT)
Dept: LAB | Facility: CLINIC | Age: 53
End: 2023-04-11
Attending: REGISTERED NURSE
Payer: COMMERCIAL

## 2023-04-11 VITALS
BODY MASS INDEX: 24.46 KG/M2 | SYSTOLIC BLOOD PRESSURE: 125 MMHG | HEART RATE: 80 BPM | OXYGEN SATURATION: 98 % | DIASTOLIC BLOOD PRESSURE: 80 MMHG | WEIGHT: 147 LBS | RESPIRATION RATE: 16 BRPM | TEMPERATURE: 98 F

## 2023-04-11 DIAGNOSIS — C90.00 MULTIPLE MYELOMA NOT HAVING ACHIEVED REMISSION (H): ICD-10-CM

## 2023-04-11 DIAGNOSIS — C90.00 MULTIPLE MYELOMA NOT HAVING ACHIEVED REMISSION (H): Primary | ICD-10-CM

## 2023-04-11 LAB
ALBUMIN SERPL BCG-MCNC: 4.2 G/DL (ref 3.5–5.2)
ALP SERPL-CCNC: 65 U/L (ref 40–129)
ALT SERPL W P-5'-P-CCNC: 12 U/L (ref 10–50)
ANION GAP SERPL CALCULATED.3IONS-SCNC: 11 MMOL/L (ref 7–15)
AST SERPL W P-5'-P-CCNC: 19 U/L (ref 10–50)
BASOPHILS # BLD AUTO: 0 10E3/UL (ref 0–0.2)
BASOPHILS NFR BLD AUTO: 0 %
BILIRUB SERPL-MCNC: 0.3 MG/DL
BUN SERPL-MCNC: 12.7 MG/DL (ref 6–20)
CALCIUM SERPL-MCNC: 8.9 MG/DL (ref 8.6–10)
CHLORIDE SERPL-SCNC: 104 MMOL/L (ref 98–107)
CREAT SERPL-MCNC: 1.08 MG/DL (ref 0.67–1.17)
DEPRECATED HCO3 PLAS-SCNC: 23 MMOL/L (ref 22–29)
EOSINOPHIL # BLD AUTO: 2.1 10E3/UL (ref 0–0.7)
EOSINOPHIL NFR BLD AUTO: 29 %
ERYTHROCYTE [DISTWIDTH] IN BLOOD BY AUTOMATED COUNT: 15.9 % (ref 10–15)
GFR SERPL CREATININE-BSD FRML MDRD: 82 ML/MIN/1.73M2
GLUCOSE SERPL-MCNC: 131 MG/DL (ref 70–99)
HCT VFR BLD AUTO: 34.5 % (ref 40–53)
HGB BLD-MCNC: 11.1 G/DL (ref 13.3–17.7)
IMM GRANULOCYTES # BLD: 0 10E3/UL
IMM GRANULOCYTES NFR BLD: 0 %
LYMPHOCYTES # BLD AUTO: 1.9 10E3/UL (ref 0.8–5.3)
LYMPHOCYTES NFR BLD AUTO: 26 %
MCH RBC QN AUTO: 29.1 PG (ref 26.5–33)
MCHC RBC AUTO-ENTMCNC: 32.2 G/DL (ref 31.5–36.5)
MCV RBC AUTO: 90 FL (ref 78–100)
MONOCYTES # BLD AUTO: 0.7 10E3/UL (ref 0–1.3)
MONOCYTES NFR BLD AUTO: 10 %
NEUTROPHILS # BLD AUTO: 2.7 10E3/UL (ref 1.6–8.3)
NEUTROPHILS NFR BLD AUTO: 35 %
NRBC # BLD AUTO: 0 10E3/UL
NRBC BLD AUTO-RTO: 0 /100
PLATELET # BLD AUTO: 382 10E3/UL (ref 150–450)
POTASSIUM SERPL-SCNC: 4 MMOL/L (ref 3.4–5.3)
PROT SERPL-MCNC: 6.6 G/DL (ref 6.4–8.3)
RBC # BLD AUTO: 3.82 10E6/UL (ref 4.4–5.9)
SODIUM SERPL-SCNC: 138 MMOL/L (ref 136–145)
TOTAL PROTEIN SERUM FOR ELP: 6.1 G/DL (ref 6.4–8.3)
WBC # BLD AUTO: 7.5 10E3/UL (ref 4–11)

## 2023-04-11 PROCEDURE — 86334 IMMUNOFIX E-PHORESIS SERUM: CPT | Mod: 26

## 2023-04-11 PROCEDURE — 80053 COMPREHEN METABOLIC PANEL: CPT

## 2023-04-11 PROCEDURE — G0463 HOSPITAL OUTPT CLINIC VISIT: HCPCS | Mod: 25 | Performed by: STUDENT IN AN ORGANIZED HEALTH CARE EDUCATION/TRAINING PROGRAM

## 2023-04-11 PROCEDURE — 84155 ASSAY OF PROTEIN SERUM: CPT

## 2023-04-11 PROCEDURE — 96365 THER/PROPH/DIAG IV INF INIT: CPT

## 2023-04-11 PROCEDURE — 82784 ASSAY IGA/IGD/IGG/IGM EACH: CPT

## 2023-04-11 PROCEDURE — 96401 CHEMO ANTI-NEOPL SQ/IM: CPT

## 2023-04-11 PROCEDURE — 84165 PROTEIN E-PHORESIS SERUM: CPT | Mod: TC | Performed by: PATHOLOGY

## 2023-04-11 PROCEDURE — 36415 COLL VENOUS BLD VENIPUNCTURE: CPT

## 2023-04-11 PROCEDURE — 85025 COMPLETE CBC W/AUTO DIFF WBC: CPT

## 2023-04-11 PROCEDURE — 83521 IG LIGHT CHAINS FREE EACH: CPT

## 2023-04-11 PROCEDURE — 250N000011 HC RX IP 250 OP 636: Performed by: STUDENT IN AN ORGANIZED HEALTH CARE EDUCATION/TRAINING PROGRAM

## 2023-04-11 PROCEDURE — 99214 OFFICE O/P EST MOD 30 MIN: CPT | Performed by: STUDENT IN AN ORGANIZED HEALTH CARE EDUCATION/TRAINING PROGRAM

## 2023-04-11 PROCEDURE — 84165 PROTEIN E-PHORESIS SERUM: CPT | Mod: 26

## 2023-04-11 PROCEDURE — 82435 ASSAY OF BLOOD CHLORIDE: CPT

## 2023-04-11 PROCEDURE — 258N000003 HC RX IP 258 OP 636: Performed by: STUDENT IN AN ORGANIZED HEALTH CARE EDUCATION/TRAINING PROGRAM

## 2023-04-11 PROCEDURE — 86334 IMMUNOFIX E-PHORESIS SERUM: CPT | Performed by: PATHOLOGY

## 2023-04-11 RX ORDER — LENALIDOMIDE 10 MG/1
10 CAPSULE ORAL DAILY
Qty: 28 CAPSULE | Refills: 0 | Status: ON HOLD | OUTPATIENT
Start: 2023-04-11 | End: 2023-04-20

## 2023-04-11 RX ADMIN — DARATUMUMAB AND HYALURONIDASE-FIHJ (HUMAN RECOMBINANT) 1800 MG: 1800; 30000 INJECTION SUBCUTANEOUS at 16:06

## 2023-04-11 RX ADMIN — ZOLEDRONIC ACID 3 MG: 4 INJECTION INTRAVENOUS at 15:53

## 2023-04-11 ASSESSMENT — PAIN SCALES - GENERAL: PAINLEVEL: NO PAIN (0)

## 2023-04-11 NOTE — TELEPHONE ENCOUNTER
Oral Chemotherapy Monitoring Program   Medication: Revlimid  Rx: 10mg PO daily on days 1 through 28 of 28 day cycle   Auth #: [id number]   Risk Category: Adult Male  Routine survey questions reviewed.   Rx to be Escribed to [facility]    Yu Goldberg  [facility]  Oncology Pharmacy Liaison   [email or web address]  [phone] (phone)  [phone] (fax)

## 2023-04-11 NOTE — PROGRESS NOTES
Infusion Nursing Note:  June Ronquillo presents today for cycle 16 day 1 darzalex faspro, zometa.    Patient seen by provider today: Yes: Dr. Vazquez   present during visit today: Not Applicable.    Note:   Patient has no questions or concerns after his appointment with Dr. Vazquez.    Intravenous Access:  Peripheral IV placed in lab.    Treatment Conditions:  Lab Results   Component Value Date    HGB 11.1 (L) 04/11/2023    WBC 7.5 04/11/2023    ANEU 4.7 03/13/2023    ANEUTAUTO 2.7 04/11/2023     04/11/2023      Lab Results   Component Value Date     04/11/2023    POTASSIUM 4.0 04/11/2023    MAG 1.9 11/10/2022    CR 1.08 04/11/2023    HARDEEP 8.9 04/11/2023    BILITOTAL 0.3 04/11/2023    ALBUMIN 4.2 04/11/2023    ALT 12 04/11/2023    AST 19 04/11/2023     Results reviewed, labs MET treatment parameters, ok to proceed with treatment.    Post Infusion Assessment:  Patient tolerated infusion without incident.  Patient tolerated darzalex faspro injection into right lower abdomen without incident.  Blood return noted pre and post infusion.  Site patent and intact, free from redness, edema or discomfort.  No evidence of extravasations.  Access discontinued per protocol.     Discharge Plan:   Patient declined prescription refills.  Discharge instructions reviewed with: Patient.  Patient and/or family verbalized understanding of discharge instructions and all questions answered.  AVS to patient via [employer].  Patient will return 5/8 for next appointment.   Patient discharged in stable condition accompanied by: self.  Departure Mode: Ambulatory.      Isabell Hutchinson RN

## 2023-04-11 NOTE — LETTER
4/11/2023       RE: June Ronquillo  [address]  [place] [address]    Dear Colleague,    Thank you for referring your patient, June Ronquillo, to the [facility]. Please see a copy of my visit note below.    Oncologic Hx:   - 4/30/2021 M spike of 34.8 in urine.M spike in blood 0.2; IgG 702 with depressed IgA and IgM. Beta 2 microglobulin 2.7. Lambda  with K/L ratio of 0.01. WBC 11.1 with absolute eos of 1.0. hgb, plt, Cr (1.1), and albumin WNL.    -4/30/2021 CT abd/pelvis showed sclerotic marrow changes with numerous lytic appearing lesions throughout the imaged portions of the spine, pelvis and ribs.      -PET scan 5/11/2021 Numerous osteolytic lesions which predominantly demonstrate uptake below liver background levels. There is one intraosseous lesion in the left lateral 9th rib that demonstrates uptake above background, possibly due to nondisplaced fracture. Adjacent to this lesion is mild  hypermetabolism to the left pleura, with new small left pleural effusion, suspicious for malignant effusion versus posttraumatic effusion. Pleural uptake may represent extramedullary myeloma extending along the intercostal space versus inflammation.    - BM bx 5/17/2021 with flow showing 4.0% plasma cells which express CD19 (dim), CD38 (bright), CD45 (dim), , and monotypic cytoplasmic lambda immunoglobulin light chains but lack CD20 and CD56.  Hypercellular bone marrow for age (approximately 75% cellularity) with adequate trilineage hematopoiesis. Plasma cell infiltrate: Interstitial, lambda monotypic and representing approximately 60% the bone marrow cellularity, by  immunohistochemical stain. Cytpgenetics with 2 related hypodiploid clones. One with loss of Y, monosomy 13 and 14 (5%) and one with translocation of 8p and 14, derivative 16 with long arm replaced by extra copy 1q,monosomy 21. FISH showed gain of 1q, loss of 13 and 14, IGH-MYC fusion  t(8:14)    - Started Vidya-RVd 21 day with velcade on days 1,8,15.     -Cycle 2 Vidya-RVd. Dose reduce dex to 80 mg d/t fatigue and stomach upset on dex days. Also having cough with basilar streaking on CXR    - 8/31/2021 BM bx -rare suspicious plasma cells in touch imprint. No increase in plasma cells by morph.    -8/31/2021 PET/CT Diffuse osteolytic lesions throughout the axial and appendicular skeleton. Increased sclerosis since prior exam 5/11/2021 without increased metabolism or size.  Hypermetabolic pretracheal lymph node as well as hypermetabolic level 2 lymph nodes within the right neck. These lymph nodes are likely reactive from autoimmune activation via medical therapy. Hypermetabolic subcentimeter nodules within the thyroid gland    - 11/11/2021 Autologous BMT    - 2/21/22 started maintenance with Revlimid 10 mg daily and daratumumab monthly; did Revlimid alone for C1 due to low IgG levels, added daratumumab starting C2    Interval Hx:   June has no new pain. He is doing well. 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 He has two new cats, [place] and [place].      Comprehensive ROS neg    Physical Exam:  /80   Pulse 80   Temp 98  F (36.7  C)   Resp 16   Wt 66.7 kg (147 lb)   SpO2 98%   BMI 24.46 kg/m      Constitutional: NAD  Eyes: no scleral icterus  Pulm: CTAB  CV: RRR, no m/r/g  GI: bowel sounds present, soft and nontender to palpation  MSK: No edema in the extremities  Neuro: alert, conversant, normal gait  Psych: appropriate mood and affect    Assessment and Plan  Logan has multiple myeloma with high risk cytogenetics. He got Vidya-RVD with VGPR, then underwent autologous transplant 11/11/21. Continue acyclovir daily for viral ppx and bactrim M/T for PJP ppx. Given his high risk cytogenetics he started vidya + revlimid maintenance therapy 2/21/22. Follow IgG and replete if it remains <300. He had severe flu-like effects from his first dose of Zometa but he has  been tolerating monthly Zometa dosed at 3 mg. Continue Vit D.   - will proceed with vidya + revlimid  - Will proceed with Zometa     Hypogammaglobulinemia Start monthly IV Ig whenever IgG <300 (consider increasing to < 400 due to recurrent infections; awaiting today's result)    Normocytic Anemia: likely d/t revlimid and daratumumab. Will keep monitoring and dose reduce if needed.      Barbi Vazquez MD PhD

## 2023-04-11 NOTE — PROGRESS NOTES
Oncologic Hx:   - 4/30/2021 M spike of 34.8 in urine.M spike in blood 0.2; IgG 702 with depressed IgA and IgM. Beta 2 microglobulin 2.7. Lambda  with K/L ratio of 0.01. WBC 11.1 with absolute eos of 1.0. hgb, plt, Cr (1.1), and albumin WNL.    -4/30/2021 CT abd/pelvis showed sclerotic marrow changes with numerous lytic appearing lesions throughout the imaged portions of the spine, pelvis and ribs.      -PET scan 5/11/2021 Numerous osteolytic lesions which predominantly demonstrate uptake below liver background levels. There is one intraosseous lesion in the left lateral 9th rib that demonstrates uptake above background, possibly due to nondisplaced fracture. Adjacent to this lesion is mild  hypermetabolism to the left pleura, with new small left pleural effusion, suspicious for malignant effusion versus posttraumatic effusion. Pleural uptake may represent extramedullary myeloma extending along the intercostal space versus inflammation.    - BM bx 5/17/2021 with flow showing 4.0% plasma cells which express CD19 (dim), CD38 (bright), CD45 (dim), , and monotypic cytoplasmic lambda immunoglobulin light chains but lack CD20 and CD56.  Hypercellular bone marrow for age (approximately 75% cellularity) with adequate trilineage hematopoiesis. Plasma cell infiltrate: Interstitial, lambda monotypic and representing approximately 60% the bone marrow cellularity, by  immunohistochemical stain. Cytpgenetics with 2 related hypodiploid clones. One with loss of Y, monosomy 13 and 14 (5%) and one with translocation of 8p and 14, derivative 16 with long arm replaced by extra copy 1q,monosomy 21. FISH showed gain of 1q, loss of 13 and 14, IGH-MYC fusion t(8:14)    - Started Miracle-RVd 21 day with velcade on days 1,8,15.     -Cycle 2 Miracle-RVd. Dose reduce dex to 80 mg d/t fatigue and stomach upset on dex days. Also having cough with basilar streaking on CXR    - 8/31/2021 BM bx -rare suspicious plasma cells in touch  imprint. No increase in plasma cells by morph.    -8/31/2021 PET/CT Diffuse osteolytic lesions throughout the axial and appendicular skeleton. Increased sclerosis since prior exam 5/11/2021 without increased metabolism or size.  Hypermetabolic pretracheal lymph node as well as hypermetabolic level 2 lymph nodes within the right neck. These lymph nodes are likely reactive from autoimmune activation via medical therapy. Hypermetabolic subcentimeter nodules within the thyroid gland    - 11/11/2021 Autologous BMT    - 2/21/22 started maintenance with Revlimid 10 mg daily and daratumumab monthly; did Revlimid alone for C1 due to low IgG levels, added daratumumab starting C2    Interval Hx:   June has no new pain. He is doing well. 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 He has two new cats, [place] and [place].      Comprehensive ROS neg    Physical Exam:  /80   Pulse 80   Temp 98  F (36.7  C)   Resp 16   Wt 66.7 kg (147 lb)   SpO2 98%   BMI 24.46 kg/m      Constitutional: NAD  Eyes: no scleral icterus  Pulm: CTAB  CV: RRR, no m/r/g  GI: bowel sounds present, soft and nontender to palpation  MSK: No edema in the extremities  Neuro: alert, conversant, normal gait  Psych: appropriate mood and affect    Assessment and Plan  Logan has multiple myeloma with high risk cytogenetics. He got Miracle-RVD with VGPR, then underwent autologous transplant 11/11/21. Continue acyclovir daily for viral ppx and bactrim M/T for PJP ppx. Given his high risk cytogenetics he started miracle + revlimid maintenance therapy 2/21/22. Follow IgG and replete if it remains <300. He had severe flu-like effects from his first dose of Zometa but he has been tolerating monthly Zometa dosed at 3 mg. Continue Vit D.   - will proceed with miracle + revlimid  - Will proceed with Zometa     Hypogammaglobulinemia Start monthly IV Ig whenever IgG <300 (consider increasing to < 400 due to recurrent infections; awaiting  today's result)    Normocytic Anemia: likely d/t revlimid and daratumumab. Will keep monitoring and dose reduce if needed.      Barbi Vazquez MD PhD

## 2023-04-11 NOTE — PATIENT INSTRUCTIONS
[facility] Triage and after hours / weekends / holidays:  [phone]    Please call the triage or after hours line if you experience a temperature greater than or equal to 100.5, shaking chills, have uncontrolled nausea, vomiting and/or diarrhea, dizziness, shortness of breath, chest pain, bleeding, unexplained bruising, or if you have any other new/concerning symptoms, questions or concerns.      If you are having any concerning symptoms or wish to speak to a provider before your next infusion visit, please call your care coordinator or triage to notify them so we can adequately serve you.     If you need a refill on a narcotic prescription or other medication, please call before your infusion appointment.

## 2023-04-12 LAB
ALBUMIN SERPL ELPH-MCNC: 3.8 G/DL (ref 3.7–5.1)
ALPHA1 GLOB SERPL ELPH-MCNC: 0.4 G/DL (ref 0.2–0.4)
ALPHA2 GLOB SERPL ELPH-MCNC: 0.7 G/DL (ref 0.5–0.9)
B-GLOBULIN SERPL ELPH-MCNC: 0.7 G/DL (ref 0.6–1)
GAMMA GLOB SERPL ELPH-MCNC: 0.5 G/DL (ref 0.7–1.6)
IGA SERPL-MCNC: 101 MG/DL (ref 84–499)
IGG SERPL-MCNC: 468 MG/DL (ref 610–1616)
IGM SERPL-MCNC: 73 MG/DL (ref 35–242)
KAPPA LC FREE SER-MCNC: 1.25 MG/DL (ref 0.33–1.94)
KAPPA LC FREE/LAMBDA FREE SER NEPH: 0.87 {RATIO} (ref 0.26–1.65)
LAMBDA LC FREE SERPL-MCNC: 1.44 MG/DL (ref 0.57–2.63)
M PROTEIN SERPL ELPH-MCNC: 0 G/DL
PROT PATTERN SERPL ELPH-IMP: ABNORMAL
PROT PATTERN SERPL IFE-IMP: NORMAL

## 2023-04-18 ENCOUNTER — PRE VISIT (OUTPATIENT)
Dept: PULMONOLOGY | Facility: CLINIC | Age: 53
End: 2023-04-18
Payer: COMMERCIAL

## 2023-04-19 ENCOUNTER — APPOINTMENT (OUTPATIENT)
Dept: ULTRASOUND IMAGING | Facility: CLINIC | Age: 53
DRG: 194 | End: 2023-04-19
Attending: NURSE PRACTITIONER
Payer: COMMERCIAL

## 2023-04-19 ENCOUNTER — HOSPITAL ENCOUNTER (INPATIENT)
Facility: CLINIC | Age: 53
LOS: 2 days | Discharge: HOME | DRG: 194 | End: 2023-04-21
Attending: EMERGENCY MEDICINE | Admitting: STUDENT IN AN ORGANIZED HEALTH CARE EDUCATION/TRAINING PROGRAM
Payer: COMMERCIAL

## 2023-04-19 ENCOUNTER — ONCOLOGY VISIT (OUTPATIENT)
Dept: ONCOLOGY | Facility: CLINIC | Age: 53
End: 2023-04-19
Payer: COMMERCIAL

## 2023-04-19 ENCOUNTER — ANCILLARY PROCEDURE (OUTPATIENT)
Dept: CT IMAGING | Facility: CLINIC | Age: 53
End: 2023-04-19
Payer: COMMERCIAL

## 2023-04-19 ENCOUNTER — LAB (OUTPATIENT)
Dept: LAB | Facility: CLINIC | Age: 53
End: 2023-04-19
Payer: COMMERCIAL

## 2023-04-19 ENCOUNTER — MYC MEDICAL ADVICE (OUTPATIENT)
Dept: ONCOLOGY | Facility: CLINIC | Age: 53
End: 2023-04-19
Payer: COMMERCIAL

## 2023-04-19 ENCOUNTER — INFUSION THERAPY VISIT (OUTPATIENT)
Dept: ONCOLOGY | Facility: CLINIC | Age: 53
End: 2023-04-19
Payer: COMMERCIAL

## 2023-04-19 ENCOUNTER — TELEPHONE (OUTPATIENT)
Dept: ONCOLOGY | Facility: CLINIC | Age: 53
End: 2023-04-19
Payer: COMMERCIAL

## 2023-04-19 ENCOUNTER — TELEPHONE (OUTPATIENT)
Dept: TRANSPLANT | Facility: CLINIC | Age: 53
End: 2023-04-19
Payer: COMMERCIAL

## 2023-04-19 VITALS
SYSTOLIC BLOOD PRESSURE: 133 MMHG | DIASTOLIC BLOOD PRESSURE: 84 MMHG | HEART RATE: 84 BPM | OXYGEN SATURATION: 99 % | RESPIRATION RATE: 20 BRPM | TEMPERATURE: 98.8 F

## 2023-04-19 VITALS
SYSTOLIC BLOOD PRESSURE: 135 MMHG | HEART RATE: 100 BPM | TEMPERATURE: 101.9 F | OXYGEN SATURATION: 100 % | RESPIRATION RATE: 18 BRPM | DIASTOLIC BLOOD PRESSURE: 83 MMHG

## 2023-04-19 DIAGNOSIS — Z20.822 CONTACT WITH AND (SUSPECTED) EXPOSURE TO COVID-19: ICD-10-CM

## 2023-04-19 DIAGNOSIS — R10.31 ABDOMINAL PAIN, RIGHT LOWER QUADRANT: ICD-10-CM

## 2023-04-19 DIAGNOSIS — M79.10 MUSCLE PAIN: ICD-10-CM

## 2023-04-19 DIAGNOSIS — C90.00 MULTIPLE MYELOMA NOT HAVING ACHIEVED REMISSION (H): ICD-10-CM

## 2023-04-19 DIAGNOSIS — R68.83 CHILLS: ICD-10-CM

## 2023-04-19 DIAGNOSIS — R50.9 FEVER, UNSPECIFIED FEVER CAUSE: ICD-10-CM

## 2023-04-19 DIAGNOSIS — M54.9 DORSALGIA: ICD-10-CM

## 2023-04-19 DIAGNOSIS — R06.02 SHORTNESS OF BREATH: ICD-10-CM

## 2023-04-19 DIAGNOSIS — R10.11 ABDOMINAL PAIN, RIGHT UPPER QUADRANT: ICD-10-CM

## 2023-04-19 DIAGNOSIS — M54.50 ACUTE RIGHT-SIDED LOW BACK PAIN WITHOUT SCIATICA: ICD-10-CM

## 2023-04-19 DIAGNOSIS — J18.9 PNEUMONIA DUE TO INFECTIOUS ORGANISM, UNSPECIFIED LATERALITY, UNSPECIFIED PART OF LUNG: ICD-10-CM

## 2023-04-19 DIAGNOSIS — R68.83 CHILLS: Primary | ICD-10-CM

## 2023-04-19 DIAGNOSIS — N17.9 ACUTE KIDNEY INJURY (H): ICD-10-CM

## 2023-04-19 DIAGNOSIS — M54.9 BACK PAIN: ICD-10-CM

## 2023-04-19 DIAGNOSIS — R50.9 FEVER, UNSPECIFIED FEVER CAUSE: Primary | ICD-10-CM

## 2023-04-19 DIAGNOSIS — N17.9 AKI (ACUTE KIDNEY INJURY) (H): ICD-10-CM

## 2023-04-19 DIAGNOSIS — J18.9 PNEUMONIA OF RIGHT LOWER LOBE DUE TO INFECTIOUS ORGANISM: Primary | ICD-10-CM

## 2023-04-19 DIAGNOSIS — M62.838 MUSCLE SPASM: ICD-10-CM

## 2023-04-19 LAB
ALBUMIN SERPL BCG-MCNC: 3.8 G/DL (ref 3.5–5.2)
ALBUMIN SERPL BCG-MCNC: 4.1 G/DL (ref 3.5–5.2)
ALBUMIN UR-MCNC: 50 MG/DL
ALP SERPL-CCNC: 68 U/L (ref 40–129)
ALP SERPL-CCNC: 75 U/L (ref 40–129)
ALT SERPL W P-5'-P-CCNC: 16 U/L (ref 10–50)
ALT SERPL W P-5'-P-CCNC: 20 U/L (ref 10–50)
AMYLASE SERPL-CCNC: 58 U/L (ref 28–100)
ANION GAP SERPL CALCULATED.3IONS-SCNC: 12 MMOL/L (ref 7–15)
ANION GAP SERPL CALCULATED.3IONS-SCNC: 9 MMOL/L (ref 7–15)
APPEARANCE UR: CLEAR
AST SERPL W P-5'-P-CCNC: 22 U/L (ref 10–50)
AST SERPL W P-5'-P-CCNC: 23 U/L (ref 10–50)
BASOPHILS # BLD AUTO: 0 10E3/UL (ref 0–0.2)
BASOPHILS # BLD AUTO: 0 10E3/UL (ref 0–0.2)
BASOPHILS NFR BLD AUTO: 0 %
BASOPHILS NFR BLD AUTO: 0 %
BILIRUB SERPL-MCNC: 0.4 MG/DL
BILIRUB SERPL-MCNC: 0.5 MG/DL
BILIRUB UR QL STRIP: NEGATIVE
BUN SERPL-MCNC: 10.5 MG/DL (ref 6–20)
BUN SERPL-MCNC: 11 MG/DL (ref 6–20)
CALCIUM SERPL-MCNC: 8.4 MG/DL (ref 8.6–10)
CALCIUM SERPL-MCNC: 9 MG/DL (ref 8.6–10)
CHLORIDE SERPL-SCNC: 102 MMOL/L (ref 98–107)
CHLORIDE SERPL-SCNC: 105 MMOL/L (ref 98–107)
COLOR UR AUTO: YELLOW
CREAT SERPL-MCNC: 1.23 MG/DL (ref 0.67–1.17)
CREAT SERPL-MCNC: 1.24 MG/DL (ref 0.67–1.17)
CREAT SERPL-MCNC: 1.27 MG/DL (ref 0.67–1.17)
DEPRECATED HCO3 PLAS-SCNC: 19 MMOL/L (ref 22–29)
DEPRECATED HCO3 PLAS-SCNC: 25 MMOL/L (ref 22–29)
EOSINOPHIL # BLD AUTO: 0.4 10E3/UL (ref 0–0.7)
EOSINOPHIL # BLD AUTO: 0.4 10E3/UL (ref 0–0.7)
EOSINOPHIL NFR BLD AUTO: 4 %
EOSINOPHIL NFR BLD AUTO: 5 %
ERYTHROCYTE [DISTWIDTH] IN BLOOD BY AUTOMATED COUNT: 16.2 % (ref 10–15)
ERYTHROCYTE [DISTWIDTH] IN BLOOD BY AUTOMATED COUNT: 16.2 % (ref 10–15)
FLUAV AG SPEC QL IA: NEGATIVE
FLUAV RNA SPEC QL NAA+PROBE: NEGATIVE
FLUBV AG SPEC QL IA: NEGATIVE
FLUBV RNA RESP QL NAA+PROBE: NEGATIVE
GFR SERPL CREATININE-BSD FRML MDRD: 67 ML/MIN/1.73M2
GFR SERPL CREATININE-BSD FRML MDRD: 69 ML/MIN/1.73M2
GFR SERPL CREATININE-BSD FRML MDRD: 70 ML/MIN/1.73M2
GLUCOSE SERPL-MCNC: 113 MG/DL (ref 70–99)
GLUCOSE SERPL-MCNC: 119 MG/DL (ref 70–99)
GLUCOSE UR STRIP-MCNC: NEGATIVE MG/DL
HCT VFR BLD AUTO: 32.2 % (ref 40–53)
HCT VFR BLD AUTO: 34.9 % (ref 40–53)
HGB BLD-MCNC: 10.3 G/DL (ref 13.3–17.7)
HGB BLD-MCNC: 11.2 G/DL (ref 13.3–17.7)
HGB UR QL STRIP: NEGATIVE
HOLD SPECIMEN: NORMAL
IMM GRANULOCYTES # BLD: 0 10E3/UL
IMM GRANULOCYTES # BLD: 0 10E3/UL
IMM GRANULOCYTES NFR BLD: 0 %
IMM GRANULOCYTES NFR BLD: 0 %
KETONES UR STRIP-MCNC: NEGATIVE MG/DL
LACTATE SERPL-SCNC: 0.6 MMOL/L (ref 0.7–2)
LACTATE SERPL-SCNC: 0.6 MMOL/L (ref 0.7–2)
LDH SERPL L TO P-CCNC: 72 U/L (ref 0–250)
LEUKOCYTE ESTERASE UR QL STRIP: NEGATIVE
LIPASE SERPL-CCNC: 21 U/L (ref 13–60)
LIPASE SERPL-CCNC: 22 U/L (ref 13–60)
LYMPHOCYTES # BLD AUTO: 1.6 10E3/UL (ref 0.8–5.3)
LYMPHOCYTES # BLD AUTO: 1.7 10E3/UL (ref 0.8–5.3)
LYMPHOCYTES NFR BLD AUTO: 20 %
LYMPHOCYTES NFR BLD AUTO: 21 %
MCH RBC QN AUTO: 29.3 PG (ref 26.5–33)
MCH RBC QN AUTO: 29.7 PG (ref 26.5–33)
MCHC RBC AUTO-ENTMCNC: 32 G/DL (ref 31.5–36.5)
MCHC RBC AUTO-ENTMCNC: 32.1 G/DL (ref 31.5–36.5)
MCV RBC AUTO: 91 FL (ref 78–100)
MCV RBC AUTO: 93 FL (ref 78–100)
MONOCYTES # BLD AUTO: 1.3 10E3/UL (ref 0–1.3)
MONOCYTES # BLD AUTO: 1.4 10E3/UL (ref 0–1.3)
MONOCYTES NFR BLD AUTO: 16 %
MONOCYTES NFR BLD AUTO: 18 %
MUCOUS THREADS #/AREA URNS LPF: PRESENT /LPF
NEUTROPHILS # BLD AUTO: 4.3 10E3/UL (ref 1.6–8.3)
NEUTROPHILS # BLD AUTO: 4.8 10E3/UL (ref 1.6–8.3)
NEUTROPHILS NFR BLD AUTO: 56 %
NEUTROPHILS NFR BLD AUTO: 60 %
NITRATE UR QL: NEGATIVE
NRBC # BLD AUTO: 0 10E3/UL
NRBC # BLD AUTO: 0 10E3/UL
NRBC BLD AUTO-RTO: 0 /100
NRBC BLD AUTO-RTO: 0 /100
PH UR STRIP: 7.5 [PH] (ref 5–7)
PLATELET # BLD AUTO: 377 10E3/UL (ref 150–450)
PLATELET # BLD AUTO: 400 10E3/UL (ref 150–450)
POTASSIUM SERPL-SCNC: 3.4 MMOL/L (ref 3.4–5.3)
POTASSIUM SERPL-SCNC: 3.7 MMOL/L (ref 3.4–5.3)
PROCALCITONIN SERPL IA-MCNC: 0.21 NG/ML
PROT SERPL-MCNC: 6.3 G/DL (ref 6.4–8.3)
PROT SERPL-MCNC: 6.7 G/DL (ref 6.4–8.3)
RBC # BLD AUTO: 3.47 10E6/UL (ref 4.4–5.9)
RBC # BLD AUTO: 3.82 10E6/UL (ref 4.4–5.9)
RBC URINE: 2 /HPF
RSV RNA SPEC NAA+PROBE: NEGATIVE
SARS-COV-2 RNA RESP QL NAA+PROBE: NEGATIVE
SARS-COV-2 RNA RESP QL NAA+PROBE: NEGATIVE
SODIUM SERPL-SCNC: 136 MMOL/L (ref 136–145)
SODIUM SERPL-SCNC: 136 MMOL/L (ref 136–145)
SP GR UR STRIP: 1.02 (ref 1–1.03)
TROPONIN T SERPL HS-MCNC: 9 NG/L
UROBILINOGEN UR STRIP-MCNC: NORMAL MG/DL
WBC # BLD AUTO: 7.6 10E3/UL (ref 4–11)
WBC # BLD AUTO: 8.2 10E3/UL (ref 4–11)
WBC URINE: 5 /HPF

## 2023-04-19 PROCEDURE — 250N000011 HC RX IP 250 OP 636: Performed by: STUDENT IN AN ORGANIZED HEALTH CARE EDUCATION/TRAINING PROGRAM

## 2023-04-19 PROCEDURE — 250N000013 HC RX MED GY IP 250 OP 250 PS 637: Performed by: STUDENT IN AN ORGANIZED HEALTH CARE EDUCATION/TRAINING PROGRAM

## 2023-04-19 PROCEDURE — 36415 COLL VENOUS BLD VENIPUNCTURE: CPT | Performed by: NURSE PRACTITIONER

## 2023-04-19 PROCEDURE — 36415 COLL VENOUS BLD VENIPUNCTURE: CPT | Performed by: PATHOLOGY

## 2023-04-19 PROCEDURE — 83605 ASSAY OF LACTIC ACID: CPT

## 2023-04-19 PROCEDURE — 999N000248 HC STATISTIC IV INSERT WITH US BY RN

## 2023-04-19 PROCEDURE — 86334 IMMUNOFIX E-PHORESIS SERUM: CPT | Performed by: STUDENT IN AN ORGANIZED HEALTH CARE EDUCATION/TRAINING PROGRAM

## 2023-04-19 PROCEDURE — 86334 IMMUNOFIX E-PHORESIS SERUM: CPT | Mod: 26

## 2023-04-19 PROCEDURE — 93005 ELECTROCARDIOGRAM TRACING: CPT | Performed by: EMERGENCY MEDICINE

## 2023-04-19 PROCEDURE — 85025 COMPLETE CBC W/AUTO DIFF WBC: CPT | Performed by: NURSE PRACTITIONER

## 2023-04-19 PROCEDURE — 99418 PROLNG IP/OBS E/M EA 15 MIN: CPT | Performed by: STUDENT IN AN ORGANIZED HEALTH CARE EDUCATION/TRAINING PROGRAM

## 2023-04-19 PROCEDURE — 81001 URINALYSIS AUTO W/SCOPE: CPT

## 2023-04-19 PROCEDURE — 36415 COLL VENOUS BLD VENIPUNCTURE: CPT

## 2023-04-19 PROCEDURE — 87637 SARSCOV2&INF A&B&RSV AMP PRB: CPT | Performed by: NURSE PRACTITIONER

## 2023-04-19 PROCEDURE — 83690 ASSAY OF LIPASE: CPT | Performed by: PATHOLOGY

## 2023-04-19 PROCEDURE — 83690 ASSAY OF LIPASE: CPT | Performed by: NURSE PRACTITIONER

## 2023-04-19 PROCEDURE — 258N000003 HC RX IP 258 OP 636: Performed by: STUDENT IN AN ORGANIZED HEALTH CARE EDUCATION/TRAINING PROGRAM

## 2023-04-19 PROCEDURE — 120N000005 HC R&B MS OVERFLOW UMMC

## 2023-04-19 PROCEDURE — 99223 1ST HOSP IP/OBS HIGH 75: CPT | Performed by: STUDENT IN AN ORGANIZED HEALTH CARE EDUCATION/TRAINING PROGRAM

## 2023-04-19 PROCEDURE — 82784 ASSAY IGA/IGD/IGG/IGM EACH: CPT | Performed by: STUDENT IN AN ORGANIZED HEALTH CARE EDUCATION/TRAINING PROGRAM

## 2023-04-19 PROCEDURE — 87040 BLOOD CULTURE FOR BACTERIA: CPT | Performed by: NURSE PRACTITIONER

## 2023-04-19 PROCEDURE — 99285 EMERGENCY DEPT VISIT HI MDM: CPT | Mod: FS | Performed by: EMERGENCY MEDICINE

## 2023-04-19 PROCEDURE — 80053 COMPREHEN METABOLIC PANEL: CPT | Performed by: PATHOLOGY

## 2023-04-19 PROCEDURE — 96365 THER/PROPH/DIAG IV INF INIT: CPT

## 2023-04-19 PROCEDURE — 258N000003 HC RX IP 258 OP 636: Performed by: NURSE PRACTITIONER

## 2023-04-19 PROCEDURE — G0463 HOSPITAL OUTPT CLINIC VISIT: HCPCS | Mod: 25

## 2023-04-19 PROCEDURE — 82150 ASSAY OF AMYLASE: CPT | Performed by: PATHOLOGY

## 2023-04-19 PROCEDURE — 82565 ASSAY OF CREATININE: CPT | Performed by: STUDENT IN AN ORGANIZED HEALTH CARE EDUCATION/TRAINING PROGRAM

## 2023-04-19 PROCEDURE — U0003 INFECTIOUS AGENT DETECTION BY NUCLEIC ACID (DNA OR RNA); SEVERE ACUTE RESPIRATORY SYNDROME CORONAVIRUS 2 (SARS-COV-2) (CORONAVIRUS DISEASE [COVID-19]), AMPLIFIED PROBE TECHNIQUE, MAKING USE OF HIGH THROUGHPUT TECHNOLOGIES AS DESCRIBED BY CMS-2020-01-R: HCPCS | Mod: 90 | Performed by: PATHOLOGY

## 2023-04-19 PROCEDURE — 76705 ECHO EXAM OF ABDOMEN: CPT | Mod: 26 | Performed by: RADIOLOGY

## 2023-04-19 PROCEDURE — 74177 CT ABD & PELVIS W/CONTRAST: CPT | Mod: GC | Performed by: RADIOLOGY

## 2023-04-19 PROCEDURE — U0005 INFEC AGEN DETEC AMPLI PROBE: HCPCS | Mod: 90 | Performed by: PATHOLOGY

## 2023-04-19 PROCEDURE — 84484 ASSAY OF TROPONIN QUANT: CPT | Performed by: NURSE PRACTITIONER

## 2023-04-19 PROCEDURE — 258N000003 HC RX IP 258 OP 636

## 2023-04-19 PROCEDURE — 36415 COLL VENOUS BLD VENIPUNCTURE: CPT | Performed by: STUDENT IN AN ORGANIZED HEALTH CARE EDUCATION/TRAINING PROGRAM

## 2023-04-19 PROCEDURE — 83615 LACTATE (LD) (LDH) ENZYME: CPT | Performed by: STUDENT IN AN ORGANIZED HEALTH CARE EDUCATION/TRAINING PROGRAM

## 2023-04-19 PROCEDURE — 99000 SPECIMEN HANDLING OFFICE-LAB: CPT | Performed by: PATHOLOGY

## 2023-04-19 PROCEDURE — 93010 ELECTROCARDIOGRAM REPORT: CPT | Performed by: NURSE PRACTITIONER

## 2023-04-19 PROCEDURE — 84155 ASSAY OF PROTEIN SERUM: CPT | Performed by: NURSE PRACTITIONER

## 2023-04-19 PROCEDURE — 99285 EMERGENCY DEPT VISIT HI MDM: CPT | Mod: 25,CS | Performed by: EMERGENCY MEDICINE

## 2023-04-19 PROCEDURE — 76705 ECHO EXAM OF ABDOMEN: CPT

## 2023-04-19 PROCEDURE — 99215 OFFICE O/P EST HI 40 MIN: CPT

## 2023-04-19 PROCEDURE — 84145 PROCALCITONIN (PCT): CPT | Performed by: STUDENT IN AN ORGANIZED HEALTH CARE EDUCATION/TRAINING PROGRAM

## 2023-04-19 PROCEDURE — 71260 CT THORAX DX C+: CPT | Mod: GC | Performed by: RADIOLOGY

## 2023-04-19 PROCEDURE — 250N000011 HC RX IP 250 OP 636: Performed by: NURSE PRACTITIONER

## 2023-04-19 PROCEDURE — 83605 ASSAY OF LACTIC ACID: CPT | Performed by: NURSE PRACTITIONER

## 2023-04-19 PROCEDURE — 85025 COMPLETE CBC W/AUTO DIFF WBC: CPT | Performed by: PATHOLOGY

## 2023-04-19 PROCEDURE — 87040 BLOOD CULTURE FOR BACTERIA: CPT

## 2023-04-19 PROCEDURE — 250N000011 HC RX IP 250 OP 636

## 2023-04-19 PROCEDURE — 99223 1ST HOSP IP/OBS HIGH 75: CPT | Mod: FS | Performed by: PHYSICIAN ASSISTANT

## 2023-04-19 PROCEDURE — 87449 NOS EACH ORGANISM AG IA: CPT | Performed by: STUDENT IN AN ORGANIZED HEALTH CARE EDUCATION/TRAINING PROGRAM

## 2023-04-19 PROCEDURE — 87804 INFLUENZA ASSAY W/OPTIC: CPT

## 2023-04-19 PROCEDURE — C9803 HOPD COVID-19 SPEC COLLECT: HCPCS | Performed by: EMERGENCY MEDICINE

## 2023-04-19 RX ORDER — IOPAMIDOL 755 MG/ML
81 INJECTION, SOLUTION INTRAVASCULAR ONCE
Status: COMPLETED | OUTPATIENT
Start: 2023-04-19 | End: 2023-04-19

## 2023-04-19 RX ORDER — LENALIDOMIDE 10 MG/1
10 CAPSULE ORAL DAILY
Status: DISCONTINUED | OUTPATIENT
Start: 2023-04-20 | End: 2023-04-21 | Stop reason: HOSPADM

## 2023-04-19 RX ORDER — PIPERACILLIN SODIUM, TAZOBACTAM SODIUM 4; .5 G/20ML; G/20ML
4.5 INJECTION, POWDER, LYOPHILIZED, FOR SOLUTION INTRAVENOUS EVERY 6 HOURS
Status: DISCONTINUED | OUTPATIENT
Start: 2023-04-19 | End: 2023-04-21

## 2023-04-19 RX ORDER — LIDOCAINE 40 MG/G
CREAM TOPICAL
Status: DISCONTINUED | OUTPATIENT
Start: 2023-04-19 | End: 2023-04-21 | Stop reason: HOSPADM

## 2023-04-19 RX ORDER — CALCIUM CARBONATE 500 MG/1
1000 TABLET, CHEWABLE ORAL 3 TIMES DAILY
Status: DISCONTINUED | OUTPATIENT
Start: 2023-04-19 | End: 2023-04-21 | Stop reason: HOSPADM

## 2023-04-19 RX ORDER — ONDANSETRON 4 MG/1
4 TABLET, ORALLY DISINTEGRATING ORAL EVERY 6 HOURS PRN
Status: DISCONTINUED | OUTPATIENT
Start: 2023-04-19 | End: 2023-04-21 | Stop reason: HOSPADM

## 2023-04-19 RX ORDER — NALOXONE HYDROCHLORIDE 0.4 MG/ML
0.2 INJECTION, SOLUTION INTRAMUSCULAR; INTRAVENOUS; SUBCUTANEOUS
Status: DISCONTINUED | OUTPATIENT
Start: 2023-04-19 | End: 2023-04-21 | Stop reason: HOSPADM

## 2023-04-19 RX ORDER — ASPIRIN 81 MG/1
81 TABLET ORAL DAILY
Status: DISCONTINUED | OUTPATIENT
Start: 2023-04-20 | End: 2023-04-21 | Stop reason: HOSPADM

## 2023-04-19 RX ORDER — ONDANSETRON 2 MG/ML
4 INJECTION INTRAMUSCULAR; INTRAVENOUS EVERY 6 HOURS PRN
Status: DISCONTINUED | OUTPATIENT
Start: 2023-04-19 | End: 2023-04-21 | Stop reason: HOSPADM

## 2023-04-19 RX ORDER — ROSUVASTATIN CALCIUM 10 MG/1
40 TABLET, COATED ORAL DAILY
Status: DISCONTINUED | OUTPATIENT
Start: 2023-04-20 | End: 2023-04-21 | Stop reason: HOSPADM

## 2023-04-19 RX ORDER — PIPERACILLIN SODIUM, TAZOBACTAM SODIUM 3; .375 G/15ML; G/15ML
3.38 INJECTION, POWDER, LYOPHILIZED, FOR SOLUTION INTRAVENOUS EVERY 6 HOURS
Status: DISCONTINUED | OUTPATIENT
Start: 2023-04-19 | End: 2023-04-19

## 2023-04-19 RX ORDER — AMOXICILLIN 250 MG
1 CAPSULE ORAL 2 TIMES DAILY
Status: DISCONTINUED | OUTPATIENT
Start: 2023-04-19 | End: 2023-04-21 | Stop reason: HOSPADM

## 2023-04-19 RX ORDER — OXYCODONE HYDROCHLORIDE 5 MG/1
5 TABLET ORAL EVERY 4 HOURS PRN
Status: DISCONTINUED | OUTPATIENT
Start: 2023-04-19 | End: 2023-04-21 | Stop reason: HOSPADM

## 2023-04-19 RX ORDER — HYDROMORPHONE HCL IN WATER/PF 6 MG/30 ML
0.2 PATIENT CONTROLLED ANALGESIA SYRINGE INTRAVENOUS
Status: DISCONTINUED | OUTPATIENT
Start: 2023-04-19 | End: 2023-04-21 | Stop reason: HOSPADM

## 2023-04-19 RX ORDER — ACETAMINOPHEN 500 MG
500-1000 TABLET ORAL EVERY 6 HOURS PRN
Status: DISCONTINUED | OUTPATIENT
Start: 2023-04-19 | End: 2023-04-21 | Stop reason: HOSPADM

## 2023-04-19 RX ORDER — NALOXONE HYDROCHLORIDE 0.4 MG/ML
0.4 INJECTION, SOLUTION INTRAMUSCULAR; INTRAVENOUS; SUBCUTANEOUS
Status: DISCONTINUED | OUTPATIENT
Start: 2023-04-19 | End: 2023-04-21 | Stop reason: HOSPADM

## 2023-04-19 RX ORDER — AMOXICILLIN 250 MG
2 CAPSULE ORAL 2 TIMES DAILY
Status: DISCONTINUED | OUTPATIENT
Start: 2023-04-19 | End: 2023-04-21 | Stop reason: HOSPADM

## 2023-04-19 RX ORDER — ENOXAPARIN SODIUM 100 MG/ML
30 INJECTION SUBCUTANEOUS EVERY EVENING
Status: DISCONTINUED | OUTPATIENT
Start: 2023-04-19 | End: 2023-04-21 | Stop reason: HOSPADM

## 2023-04-19 RX ORDER — SODIUM CHLORIDE, SODIUM LACTATE, POTASSIUM CHLORIDE, CALCIUM CHLORIDE 600; 310; 30; 20 MG/100ML; MG/100ML; MG/100ML; MG/100ML
INJECTION, SOLUTION INTRAVENOUS CONTINUOUS
Status: DISCONTINUED | OUTPATIENT
Start: 2023-04-19 | End: 2023-04-20

## 2023-04-19 RX ORDER — FLUTICASONE PROPIONATE 50 MCG
1 SPRAY, SUSPENSION (ML) NASAL DAILY
Status: DISCONTINUED | OUTPATIENT
Start: 2023-04-20 | End: 2023-04-20

## 2023-04-19 RX ORDER — POTASSIUM CHLORIDE 750 MG/1
20 TABLET, EXTENDED RELEASE ORAL DAILY
Status: DISCONTINUED | OUTPATIENT
Start: 2023-04-20 | End: 2023-04-21 | Stop reason: HOSPADM

## 2023-04-19 RX ORDER — HYDROMORPHONE HCL IN WATER/PF 6 MG/30 ML
0.2 PATIENT CONTROLLED ANALGESIA SYRINGE INTRAVENOUS ONCE
Status: COMPLETED | OUTPATIENT
Start: 2023-04-19 | End: 2023-04-19

## 2023-04-19 RX ORDER — ACYCLOVIR 800 MG/1
800 TABLET ORAL 2 TIMES DAILY
Status: DISCONTINUED | OUTPATIENT
Start: 2023-04-19 | End: 2023-04-21 | Stop reason: HOSPADM

## 2023-04-19 RX ORDER — LOSARTAN POTASSIUM 50 MG/1
50 TABLET ORAL DAILY
Status: DISCONTINUED | OUTPATIENT
Start: 2023-04-20 | End: 2023-04-21 | Stop reason: HOSPADM

## 2023-04-19 RX ORDER — ACETAMINOPHEN 500 MG
500-1000 TABLET ORAL EVERY 6 HOURS PRN
COMMUNITY
End: 2024-01-24

## 2023-04-19 RX ADMIN — PIPERACILLIN SODIUM AND TAZOBACTAM SODIUM 4.5 G: 4; .5 INJECTION, POWDER, LYOPHILIZED, FOR SOLUTION INTRAVENOUS at 16:52

## 2023-04-19 RX ADMIN — SODIUM CHLORIDE 1000 ML: 9 INJECTION, SOLUTION INTRAVENOUS at 16:51

## 2023-04-19 RX ADMIN — AZITHROMYCIN 500 MG: 500 INJECTION, POWDER, LYOPHILIZED, FOR SOLUTION INTRAVENOUS at 21:18

## 2023-04-19 RX ADMIN — SODIUM CHLORIDE, POTASSIUM CHLORIDE, SODIUM LACTATE AND CALCIUM CHLORIDE 1000 ML: 600; 310; 30; 20 INJECTION, SOLUTION INTRAVENOUS at 20:13

## 2023-04-19 RX ADMIN — OXYCODONE HYDROCHLORIDE 5 MG: 5 TABLET ORAL at 22:40

## 2023-04-19 RX ADMIN — PIPERACILLIN SODIUM AND TAZOBACTAM SODIUM 4.5 G: 4; .5 INJECTION, POWDER, LYOPHILIZED, FOR SOLUTION INTRAVENOUS at 23:54

## 2023-04-19 RX ADMIN — CALCIUM CARBONATE 1000 MG: 500 TABLET, CHEWABLE ORAL at 23:59

## 2023-04-19 RX ADMIN — SODIUM CHLORIDE, POTASSIUM CHLORIDE, SODIUM LACTATE AND CALCIUM CHLORIDE: 600; 310; 30; 20 INJECTION, SOLUTION INTRAVENOUS at 22:36

## 2023-04-19 RX ADMIN — IOPAMIDOL 81 ML: 755 INJECTION, SOLUTION INTRAVASCULAR at 16:01

## 2023-04-19 RX ADMIN — ACYCLOVIR 800 MG: 800 TABLET ORAL at 22:40

## 2023-04-19 RX ADMIN — ENOXAPARIN SODIUM 30 MG: 30 INJECTION SUBCUTANEOUS at 23:59

## 2023-04-19 RX ADMIN — HYDROMORPHONE HYDROCHLORIDE 0.2 MG: 0.2 INJECTION, SOLUTION INTRAMUSCULAR; INTRAVENOUS; SUBCUTANEOUS at 21:15

## 2023-04-19 ASSESSMENT — PAIN SCALES - GENERAL: PAINLEVEL: MILD PAIN (3)

## 2023-04-19 ASSESSMENT — ACTIVITIES OF DAILY LIVING (ADL)
ADLS_ACUITY_SCORE: 35
ADLS_ACUITY_SCORE: 20
ADLS_ACUITY_SCORE: 35

## 2023-04-19 NOTE — PATIENT INSTRUCTIONS
[facility] Triage and after hours / weekends / holidays:  [phone]    Please call the triage or after hours line if you experience a temperature greater than or equal to 100.4, shaking chills, have uncontrolled nausea, vomiting and/or diarrhea, dizziness, shortness of breath, chest pain, bleeding, unexplained bruising, or if you have any other new/concerning symptoms, questions or concerns.      If you are having any concerning symptoms or wish to speak to a provider before your next infusion visit, please call triage to notify them so we can adequately serve you.     If you need a refill on a narcotic prescription or other medication, please call before your infusion appointment.                 April 2023 Sunday Monday Tuesday Wednesday Thursday Friday Saturday                                 1       2     3     4     5     6     7     8       9     10     11    LAB PERIPHERAL   2:30 PM   (15 min.)   [facility] LAB DRAW   [facility]    RETURN   2:45 PM   (30 min.)   Barbi Vazquez MD   [facility]    ONC INFUSION 1 HR (60 MIN)   3:30 PM   (60 min.)    ONC INFUSION NURSE   [facility] 12     13     14     15       16     17     18     19    LAB   1:00 PM   (15 min.)    LAB   [facility] [place]    COVID PCR   1:30 PM   (15 min.)    COVID LAB   [facility] [place]    RETURN CCSL   1:30 PM   (45 min.)   Yasmin Johnson PA-C   [facility]    ONC INFUSION 2 HR (120 MIN)   2:30 PM   (120 min.)    ONC INFUSION NURSE   [facility]    CT CHEST ABDOMEN PELVIS WWO   5:00 PM   (20 min.)   UCSCCT2   [facility] [place] 20     21     22       23     24     25     26     27    [facility] FULL PULMONARY FUNCTION   2:15 PM   (60 min.)    PFL D   [facility] Pulmonary Function Testing [place]    CT CHEST  WO   3:25 PM   (20 min.)   UCSCCT2   [facility] NEW   3:45 PM   (60 min.)   Ibrahima Quevedo MD   [facility] [place] 28     29       30                                                 May 2023      [place] Monday Tuesday Wednesday Thursday Friday Saturday        1     2     3     4     5     6       7     8    LAB PERIPHERAL  12:00 PM   (15 min.)   [facility] LAB DRAW   [facility]    RETURN  12:15 PM   (45 min.)   Laura Ballesteros APRN CNP   [facility]    ONC INFUSION 1 HR (60 MIN)   1:30 PM   (60 min.)    ONC INFUSION NURSE   [facility] 9     10     11     12     13       14     15     16     17     18     19     20       21     22     23     24     25     26     27       28     29     30     31                                      Lab Results:  Recent Results (from the past 12 hour(s))   Comprehensive metabolic panel    Collection Time: 04/19/23  1:03 PM   Result Value Ref Range    Sodium 136 136 - 145 mmol/L    Potassium 3.7 3.4 - 5.3 mmol/L    Chloride 102 98 - 107 mmol/L    Carbon Dioxide (CO2) 25 22 - 29 mmol/L    Anion Gap 9 7 - 15 mmol/L    Urea Nitrogen 11.0 6.0 - 20.0 mg/dL    Creatinine 1.27 (H) 0.67 - 1.17 mg/dL    Calcium 9.0 8.6 - 10.0 mg/dL    Glucose 113 (H) 70 - 99 mg/dL    Alkaline Phosphatase 75 40 - 129 U/L    AST 22 10 - 50 U/L    ALT 20 10 - 50 U/L    Protein Total 6.7 6.4 - 8.3 g/dL    Albumin 4.1 3.5 - 5.2 g/dL    Bilirubin Total 0.4 <=1.2 mg/dL    GFR Estimate 67 >60 mL/min/1.73m2   CBC with platelets and differential    Collection Time: 04/19/23  1:03 PM   Result Value Ref Range    WBC Count 8.2 4.0 - 11.0 10e3/uL    RBC Count 3.82 (L) 4.40 - 5.90 10e6/uL    Hemoglobin 11.2 (L) 13.3 - 17.7 g/dL    Hematocrit 34.9 (L) 40.0 - 53.0 %    MCV 91 78 - 100 fL    MCH 29.3 26.5 - 33.0 pg    MCHC 32.1 31.5 -  36.5 g/dL    RDW 16.2 (H) 10.0 - 15.0 %    Platelet Count 400 150 - 450 10e3/uL    % Neutrophils 60 %    % Lymphocytes 20 %    % Monocytes 16 %    % Eosinophils 4 %    % Basophils 0 %    % Immature Granulocytes 0 %    NRBCs per 100 WBC 0 <1 /100    Absolute Neutrophils 4.8 1.6 - 8.3 10e3/uL    Absolute Lymphocytes 1.7 0.8 - 5.3 10e3/uL    Absolute Monocytes 1.3 0.0 - 1.3 10e3/uL    Absolute Eosinophils 0.4 0.0 - 0.7 10e3/uL    Absolute Basophils 0.0 0.0 - 0.2 10e3/uL    Absolute Immature Granulocytes 0.0 <=0.4 10e3/uL    Absolute NRBCs 0.0 10e3/uL   Routine UA with micro reflex to culture    Collection Time: 04/19/23  2:39 PM    Specimen: Urine, Midstream   Result Value Ref Range    Color Urine Yellow Colorless, Straw, Light Yellow, Yellow    Appearance Urine Clear Clear    Glucose Urine Negative Negative mg/dL    Bilirubin Urine Negative Negative    Ketones Urine Negative Negative mg/dL    Specific Gravity Urine 1.017 1.003 - 1.035    Blood Urine Negative Negative    pH Urine 7.5 (H) 5.0 - 7.0    Protein Albumin Urine 50 (A) Negative mg/dL    Urobilinogen Urine Normal Normal, 2.0 mg/dL    Nitrite Urine Negative Negative    Leukocyte Esterase Urine Negative Negative    Mucus Urine Present (A) None Seen /LPF    RBC Urine 2 <=2 /HPF    WBC Urine 5 <=5 /HPF

## 2023-04-19 NOTE — NURSING NOTE
Labs completed via venipuncture.  Blood cultures were collected from each arm (2 bottles per arm), UA obtained, and additional lactic acid drawn in green tube, on ice.    All labs were walked to the lab, and handed to tech.      See flow sheets.    Niharika Marlow CMA (AAMA)

## 2023-04-19 NOTE — ED NOTES
Bed: ED10  Expected date:   Expected time:   Means of arrival:   Comments:  [facility]

## 2023-04-19 NOTE — ED PROVIDER NOTES
ED Provider Note  [facility]      History     Chief Complaint   Patient presents with     Back Pain     Shortness of Breath     HPI  Jnue Ronquillo is a 52 year old male with a PMH significant for multiple myeloma (on chemotherapy) who presents from oncology clinic for chills and rigors along with shortness of breath that started yesterday.  He reports starting yesterday he noticed RUQ/right lower lung pain and back pain in approximately the same place. he has increased the thermostat at home and is wearing multiple layers of clothing but still feels cold. Highest fever at home was 99.0. He reports some shortness of breath that started today, present at all times. Also has a mild headache. He denies any cough, upper chest pain, chest pressure, pharyngitis, nausea, vomiting, diarrhea, constipation, peripheral edema or other symptoms. He received 1L IV fluids and IV Zosyn at infusion clinic earlier today, he was also sent for chest CT and reports he was told there is a new spot on his lung.     Past Medical History  Past Medical History:   Diagnosis Date     CAD (coronary artery disease)     s/p stent to CFX     Heart attack (H)      Hyperlipidemia LDL goal <100      Hypertension      Stented coronary artery      Thyroid disease      Past Surgical History:   Procedure Laterality Date     BONE MARROW BIOPSY, BONE SPECIMEN, NEEDLE/TROCAR Left 5/17/2021    Procedure: BIOPSY, BONE MARROW with aspiration and ancillary studies;  Surgeon: Niharika Regalado MD;  Location: [facility] OR     BONE MARROW BIOPSY, BONE SPECIMEN, NEEDLE/TROCAR Left 10/14/2021    Procedure: BIOPSY, BONE MARROW;  Surgeon: Alexa Albert APRN CNP;  Location: [facility] OR     BONE MARROW BIOPSY, BONE SPECIMEN, NEEDLE/TROCAR Right 3/7/2022    Procedure: BIOPSY, BONE MARROW;  Surgeon: Deborah Carmona PA-C;  Location: [facility] OR     BONE MARROW BIOPSY, BONE SPECIMEN, NEEDLE/TROCAR N/A 5/9/2022    Procedure: BIOPSY, BONE  MARROW;  Surgeon: Deborah Leblanc PA-C;  Location: [facility] OR     BONE MARROW BIOPSY, BONE SPECIMEN, NEEDLE/TROCAR Left 12/5/2022    Procedure: BIOPSY, BONE MARROW;  Surgeon: Alba Moses APRN CNP;  Location: [facility] OR     HEART CATH PTCA SINGLE VESSEL  10/10/2004    Stent to Load DynamiX      INSERT CATHETER VASCULAR ACCESS Right 11/3/2021    Procedure: NON VALVED TUNNELED DUAL LUMEN APHARESIS CAPABLE LINE INSERTION, VASCULAR ACCESS CATHETER;  Surgeon: Werner Mcnamara MD;  Location: [facility] OR     IR CVC TUNNEL PLACEMENT > 5 YRS OF AGE  11/3/2021     IR CVC TUNNEL REMOVAL RIGHT  12/2/2021     THYROIDECTOMY N/A 5/3/2022    Procedure: total thyroidectomy, Left selective neck dissection level 6 and level 7 ;  Surgeon: Clarita Sepulveda MD;  Location: [facility] OR     acetaminophen (TYLENOL) 500 MG tablet  acyclovir (ZOVIRAX) 800 MG tablet  aspirin (ASA) 81 MG EC tablet  calcium carb-cholecalciferol (OS-HARDEEP) 500-200 MG-UNIT tablet  calcium carbonate (TUMS) 500 MG chewable tablet  fluticasone (FLONASE) 50 MCG/ACT nasal spray  LENalidomide (REVLIMID) 10 MG CAPS capsule  levothyroxine (SYNTHROID/LEVOTHROID) 125 MCG tablet  losartan (COZAAR) 50 MG tablet  meclizine (ANTIVERT) 25 MG tablet  potassium chloride ER (KLOR-CON M) 10 MEQ CR tablet  rosuvastatin (CRESTOR) 40 MG tablet  vitamin B complex with vitamin C (STRESS TAB) tablet      Allergies   Allergen Reactions     Blood Transfusion Related (Informational Only) Other (See Comments)     Patient has a history of a clinically significant antibody against RBC antigens.  A delay in compatible RBCs may occur.      Family History  Family History   Problem Relation Age of Onset     Hypertension Mother      Hyperlipidemia Mother      Heart Disease Paternal Grandmother      Hyperlipidemia Sister      Hyperlipidemia Sister      Social History   Social History     Tobacco Use     Smoking status: Never     Smokeless tobacco: Never   Substance Use Topics     Alcohol use:  Not Currently     Drug use: No         A medically appropriate review of systems was performed with pertinent positives and negatives noted in the HPI, and all other systems negative.    Physical Exam   BP: 132/86  Pulse: 99  Temp: 98.9  F (37.2  C)  Resp: 25  SpO2: 99 %  Physical Exam  Vitals and nursing note reviewed.   Constitutional:       Appearance: He is well-developed. He is ill-appearing and toxic-appearing.   HENT:      Head: Normocephalic and atraumatic.   Cardiovascular:      Rate and Rhythm: Normal rate and regular rhythm.   Pulmonary:      Effort: Pulmonary effort is normal.      Breath sounds: Normal breath sounds. No decreased breath sounds or wheezing.   Abdominal:      General: Bowel sounds are normal.      Palpations: Abdomen is soft.      Tenderness: There is no abdominal tenderness. There is no guarding or rebound.   Musculoskeletal:         General: Normal range of motion.   Skin:     General: Skin is warm and dry.      Capillary Refill: Capillary refill takes less than 2 seconds.   Neurological:      General: No focal deficit present.      Mental Status: He is alert and oriented to person, place, and time.   Psychiatric:         Mood and Affect: Mood normal.         Behavior: Behavior normal.         ED Course, Procedures, & Data      Procedures       ED Course Selections:        EKG Interpretation:      Interpreted by PATTY White CNP  Time reviewed: 1825  Symptoms at time of EKG: shortness of breath    Rhythm: normal sinus   Rate: normal  Axis: normal  Ectopy: none  Conduction: normal  ST Segments/ T Waves: No ST-T wave changes  Q Waves: none  Comparison to prior: normal ST-T waves, prior EKG from 10/13/21 showed nonspecific ST-T waves in V1, appear normal today.    Clinical Impression: normal EKG       Results for orders placed or performed during the hospital encounter of 04/19/23   Abdomen US, limited (RUQ only)     Status: None (Preliminary result)    Impression    RESIDENT  PRELIMINARY INTERPRETATION  IMPRESSION:   1.  Normal right upper quadrant ultrasound. No sonographic evidence of  cholecystitis.   Comprehensive metabolic panel     Status: Abnormal   Result Value Ref Range    Sodium 136 136 - 145 mmol/L    Potassium 3.4 3.4 - 5.3 mmol/L    Chloride 105 98 - 107 mmol/L    Carbon Dioxide (CO2) 19 (L) 22 - 29 mmol/L    Anion Gap 12 7 - 15 mmol/L    Urea Nitrogen 10.5 6.0 - 20.0 mg/dL    Creatinine 1.24 (H) 0.67 - 1.17 mg/dL    Calcium 8.4 (L) 8.6 - 10.0 mg/dL    Glucose 119 (H) 70 - 99 mg/dL    Alkaline Phosphatase 68 40 - 129 U/L    AST 23 10 - 50 U/L    ALT 16 10 - 50 U/L    Protein Total 6.3 (L) 6.4 - 8.3 g/dL    Albumin 3.8 3.5 - 5.2 g/dL    Bilirubin Total 0.5 <=1.2 mg/dL    GFR Estimate 69 >60 mL/min/1.73m2   Lipase     Status: Normal   Result Value Ref Range    Lipase 21 13 - 60 U/L   Lactic acid whole blood     Status: Abnormal   Result Value Ref Range    Lactic Acid 0.6 (L) 0.7 - 2.0 mmol/L   Troponin T, High Sensitivity     Status: Normal   Result Value Ref Range    Troponin T, High Sensitivity 9 <=22 ng/L   CBC with platelets and differential     Status: Abnormal   Result Value Ref Range    WBC Count 7.6 4.0 - 11.0 10e3/uL    RBC Count 3.47 (L) 4.40 - 5.90 10e6/uL    Hemoglobin 10.3 (L) 13.3 - 17.7 g/dL    Hematocrit 32.2 (L) 40.0 - 53.0 %    MCV 93 78 - 100 fL    MCH 29.7 26.5 - 33.0 pg    MCHC 32.0 31.5 - 36.5 g/dL    RDW 16.2 (H) 10.0 - 15.0 %    Platelet Count 377 150 - 450 10e3/uL    % Neutrophils 56 %    % Lymphocytes 21 %    % Monocytes 18 %    % Eosinophils 5 %    % Basophils 0 %    % Immature Granulocytes 0 %    NRBCs per 100 WBC 0 <1 /100    Absolute Neutrophils 4.3 1.6 - 8.3 10e3/uL    Absolute Lymphocytes 1.6 0.8 - 5.3 10e3/uL    Absolute Monocytes 1.4 (H) 0.0 - 1.3 10e3/uL    Absolute Eosinophils 0.4 0.0 - 0.7 10e3/uL    Absolute Basophils 0.0 0.0 - 0.2 10e3/uL    Absolute Immature Granulocytes 0.0 <=0.4 10e3/uL    Absolute NRBCs 0.0 10e3/uL   [place] Draw      Status: None (In process)    Narrative    The following orders were created for panel order [place] Draw.  Procedure                               Abnormality         Status                     ---------                               -----------         ------                     Extra Blue Top Tube[[id number]]                              In process                 Extra Red Top Tube[[id number]]                                                            Please view results for these tests on the individual orders.   EKG 12-lead, tracing only     Status: None (Preliminary result)   Result Value Ref Range    Systolic Blood Pressure  mmHg    Diastolic Blood Pressure  mmHg    Ventricular Rate 93 BPM    Atrial Rate 93 BPM    MT Interval 132 ms    QRS Duration 72 ms     ms    QTc 447 ms    P Axis 52 degrees    R AXIS 5 degrees    T Axis 4 degrees    Interpretation ECG Sinus rhythm  Normal ECG      CBC with platelets differential     Status: Abnormal    Narrative    The following orders were created for panel order CBC with platelets differential.  Procedure                               Abnormality         Status                     ---------                               -----------         ------                     CBC with platelets and d...[[id number]]  Abnormal            Final result                 Please view results for these tests on the individual orders.   Results for orders placed or performed in visit on 04/19/23   Lactic acid whole blood     Status: Abnormal   Result Value Ref Range    Lactic Acid 0.6 (L) 0.7 - 2.0 mmol/L   Results for orders placed or performed in visit on 04/19/23   Routine UA with micro reflex to culture     Status: Abnormal    Specimen: Urine, Midstream   Result Value Ref Range    Color Urine Yellow Colorless, Straw, Light Yellow, Yellow    Appearance Urine Clear Clear    Glucose Urine Negative Negative mg/dL    Bilirubin Urine Negative Negative    Ketones Urine Negative Negative  mg/dL    Specific Gravity Urine 1.017 1.003 - 1.035    Blood Urine Negative Negative    pH Urine 7.5 (H) 5.0 - 7.0    Protein Albumin Urine 50 (A) Negative mg/dL    Urobilinogen Urine Normal Normal, 2.0 mg/dL    Nitrite Urine Negative Negative    Leukocyte Esterase Urine Negative Negative    Mucus Urine Present (A) None Seen /LPF    RBC Urine 2 <=2 /HPF    WBC Urine 5 <=5 /HPF    Narrative    Urine Culture not indicated   Influenza A & B Antigen     Status: Normal    Specimen: Nose; Swab   Result Value Ref Range    Influenza A antigen Negative Negative    Influenza B antigen Negative Negative    Narrative    Test results must be correlated with clinical data. If necessary, results should be confirmed by a molecular assay or viral culture.   Results for orders placed or performed in visit on 04/19/23   Symptomatic COVID-19 Virus (Coronavirus) by PCR Nose     Status: Normal    Specimen: Nose; Swab   Result Value Ref Range    SARS CoV2 PCR Negative Negative    Narrative    Testing was performed using the Aptima SARS-CoV-2 Assay on the  [employer] Instrument System. Additional information about this  Emergency Use Authorization (EUA) assay can be found via the Lab  Guide. This test should be ordered for the detection of SARS-CoV-2 in  individuals who meet SARS-CoV-2 clinical and/or epidemiological  criteria. Test performance is unknown in asymptomatic patients. This  test is for in vitro diagnostic use under the FDA EUA for  laboratories certified under CLIA to perform high complexity testing.  This test has not been FDA cleared or approved. A negative result  does not rule out the presence of PCR inhibitors in the specimen or  target RNA in concentration below the limit of detection for the  assay. The possibility of a false negative should be considered if  the patient's recent exposure or clinical presentation suggests  COVID-19. This test was validated by the [facility]. This laboratory is certified under  the Clinical Laboratory Improvement Amendments of 1988 (CLIA-88) as  qualified to perform high complexity laboratory testing.   Results for orders placed or performed in visit on 04/19/23   Comprehensive metabolic panel     Status: Abnormal   Result Value Ref Range    Sodium 136 136 - 145 mmol/L    Potassium 3.7 3.4 - 5.3 mmol/L    Chloride 102 98 - 107 mmol/L    Carbon Dioxide (CO2) 25 22 - 29 mmol/L    Anion Gap 9 7 - 15 mmol/L    Urea Nitrogen 11.0 6.0 - 20.0 mg/dL    Creatinine 1.27 (H) 0.67 - 1.17 mg/dL    Calcium 9.0 8.6 - 10.0 mg/dL    Glucose 113 (H) 70 - 99 mg/dL    Alkaline Phosphatase 75 40 - 129 U/L    AST 22 10 - 50 U/L    ALT 20 10 - 50 U/L    Protein Total 6.7 6.4 - 8.3 g/dL    Albumin 4.1 3.5 - 5.2 g/dL    Bilirubin Total 0.4 <=1.2 mg/dL    GFR Estimate 67 >60 mL/min/1.73m2   CBC with platelets and differential     Status: Abnormal   Result Value Ref Range    WBC Count 8.2 4.0 - 11.0 10e3/uL    RBC Count 3.82 (L) 4.40 - 5.90 10e6/uL    Hemoglobin 11.2 (L) 13.3 - 17.7 g/dL    Hematocrit 34.9 (L) 40.0 - 53.0 %    MCV 91 78 - 100 fL    MCH 29.3 26.5 - 33.0 pg    MCHC 32.1 31.5 - 36.5 g/dL    RDW 16.2 (H) 10.0 - 15.0 %    Platelet Count 400 150 - 450 10e3/uL    % Neutrophils 60 %    % Lymphocytes 20 %    % Monocytes 16 %    % Eosinophils 4 %    % Basophils 0 %    % Immature Granulocytes 0 %    NRBCs per 100 WBC 0 <1 /100    Absolute Neutrophils 4.8 1.6 - 8.3 10e3/uL    Absolute Lymphocytes 1.7 0.8 - 5.3 10e3/uL    Absolute Monocytes 1.3 0.0 - 1.3 10e3/uL    Absolute Eosinophils 0.4 0.0 - 0.7 10e3/uL    Absolute Basophils 0.0 0.0 - 0.2 10e3/uL    Absolute Immature Granulocytes 0.0 <=0.4 10e3/uL    Absolute NRBCs 0.0 10e3/uL   Lipase     Status: Normal   Result Value Ref Range    Lipase 22 13 - 60 U/L   Amylase     Status: Normal   Result Value Ref Range    Amylase 58 28 - 100 U/L   CBC with platelets differential     Status: Abnormal    Narrative    The  following orders were created for panel order CBC with platelets differential.  Procedure                               Abnormality         Status                     ---------                               -----------         ------                     CBC with platelets and d...[[id number]]  Abnormal            Final result                 Please view results for these tests on the individual orders.     Medications   lactated ringers BOLUS 1,000 mL (has no administration in time range)     Labs Ordered and Resulted from Time of ED Arrival to Time of ED Departure   COMPREHENSIVE METABOLIC PANEL - Abnormal       Result Value    Sodium 136      Potassium 3.4      Chloride 105      Carbon Dioxide (CO2) 19 (*)     Anion Gap 12      Urea Nitrogen 10.5      Creatinine 1.24 (*)     Calcium 8.4 (*)     Glucose 119 (*)     Alkaline Phosphatase 68      AST 23      ALT 16      Protein Total 6.3 (*)     Albumin 3.8      Bilirubin Total 0.5      GFR Estimate 69     LACTIC ACID WHOLE BLOOD - Abnormal    Lactic Acid 0.6 (*)    CBC WITH PLATELETS AND DIFFERENTIAL - Abnormal    WBC Count 7.6      RBC Count 3.47 (*)     Hemoglobin 10.3 (*)     Hematocrit 32.2 (*)     MCV 93      MCH 29.7      MCHC 32.0      RDW 16.2 (*)     Platelet Count 377      % Neutrophils 56      % Lymphocytes 21      % Monocytes 18      % Eosinophils 5      % Basophils 0      % Immature Granulocytes 0      NRBCs per 100 WBC 0      Absolute Neutrophils 4.3      Absolute Lymphocytes 1.6      Absolute Monocytes 1.4 (*)     Absolute Eosinophils 0.4      Absolute Basophils 0.0      Absolute Immature Granulocytes 0.0      Absolute NRBCs 0.0     LIPASE - Normal    Lipase 21     TROPONIN T, HIGH SENSITIVITY - Normal    Troponin T, High Sensitivity 9     INFLUENZA A/B, RSV, & SARS-COV2 PCR   BLOOD CULTURE   BLOOD CULTURE     Abdomen US, limited (RUQ only)   Preliminary Result   RESIDENT PRELIMINARY INTERPRETATION   IMPRESSION:    1.  Normal right upper quadrant  ultrasound. No sonographic evidence of   cholecystitis.             Critical care was not performed.     Medical Decision Making  The patient's presentation was of moderate complexity (an acute illness with systemic symptoms).    The patient's evaluation involved:  review of external note(s) from 3+ sources (prior hx and treatments)  ordering and/or review of 3+ test(s) in this encounter (see separate area of note for details)  review of 3+ test result(s) ordered prior to this encounter (prior labs and imaging)  independent interpretation of testing performed by another health professional (outpatient CT)  discussion of management or test interpretation with another health professional (radiologist, internal medicine)    The patient's management necessitated high risk (a decision regarding hospitalization).      Assessment & Plan    June Ronquillo is a 52 year old male with a PMH significant for multiple myeloma (on chemotherapy) who presents from oncology clinic for chills and rigors along with shortness of breath and RUQ abdominal pain that started yesterday. He is ill and toxic appearing in quite a bit of discomfort. He is dressed in multiple layers of clothing with multiple blankets on, still feeling cold. Differential includes sepsis/bacteremia, pneumonia, UTI/pyelonephritis, covid/influenza/RSV, cholecystitis amongst others. I reviewed labs collected in clinic as well as CT scan from prior to arrival (no radiology read yet) which appeared to show a RLL consolidation. Labs collected in clinic indicated new AZUCENA, protein in urine but no nitrites or leukocytes. Upon arrival to ED, patient had IV in place, additional labs and blood cultures were collected. Repeat and new labs collected including blood cultures, patient sent for RUQ ultrasound due to location of pain in RUQ as well as increased prominence of gal bladder visualized on my CT read.     Repeat labs show no leukocytosis, negative lactate.  Hemoglobin dropped around 1 gram from earlier today, however he did received 1L IV fluids during that time. Will continue to monitor. Creatinine remains elevated at 1.24 with a new decrease in bicarb to 19, no anion gap. Patient given 1L LR.     I reviewed the ultrasound and radiologist report which showed no cholecystitis or other obvious source of pain. I discussed the CT results with the radiologist who noted a RLL consolidation, no other obvious source patient symptoms. Will plan to admit to hospital for IV antibiotics, given IV Zosyn (2nd dose) as well as IV Azithromycin in ED. Will collect urine culture prior to antibiotics. Discussed with internal medicine physician who accepted patient onto their service for ongoing management.     --    ED Attending Physician Attestation    I Nunu Ovalles MD, cared for this patient with the Advanced Practice Provider ([facility]). I have performed a history and physical examination of the patient independent of the [facility]. I reviewed the [facility]'s documentation above and agree with the documented findings and plan of care. I personally provided a substantive portion of the care for this patient, including the complete Medical Decision Making. Please see the [facility]OR[facility]'s documentation for full details.    Summary of HPI, PE, ED Course   Patient is a 52 year old male evaluated in the emergency department for chills, malaise, cough in the setting of ongoing chemotherapy for multiple myeloma. Exam and ED course notable for ill-appearing male in no significant respiratory distress.  Outside CT of the chest, abdomen and pelvis reviewed with radiology and has a right lung infiltrate, remainder of imaging reportedly negative however final read is still pending.  Right upper quadrant ultrasound is unremarkable.  UA negative.  Blood cultures drawn and pending.  He was given additional Zosyn as well as azithromycin.  Plan to admit to medicine for continued IV antibiotics and follow-up on cultures.   After the completion of care in the emergency department, the patient was admitted to inpatient.      Nunu Ovalles MD  Emergency Medicine     I have reviewed the nursing notes. I have reviewed the findings, diagnosis, plan a with the patient.    New Prescriptions    No medications on file       Final diagnoses:   Abdominal pain, right upper quadrant   Back pain   Chills   AZUCENA (acute kidney injury) (H)   Pneumonia of right lower lobe due to infectious organism       PATTY White [facility] EMERGENCY DEPARTMENT  4/19/2023     Nunu Ovalles MD  04/19/23 [id number]

## 2023-04-19 NOTE — PROGRESS NOTES
Apr 19, 2023  Oncologic Hx:   - 4/30/2021 M spike of 34.8 in urine.M spike in blood 0.2; IgG 702 with depressed IgA and IgM. Beta 2 microglobulin 2.7. Lambda  with K/L ratio of 0.01. WBC 11.1 with absolute eos of 1.0. hgb, plt, Cr (1.1), and albumin WNL.    -4/30/2021 CT abd/pelvis showed sclerotic marrow changes with numerous lytic appearing lesions throughout the imaged portions of the spine, pelvis and ribs.      -PET scan 5/11/2021 Numerous osteolytic lesions which predominantly demonstrate uptake below liver background levels. There is one intraosseous lesion in the left lateral 9th rib that demonstrates uptake above background, possibly due to nondisplaced fracture. Adjacent to this lesion is mild  hypermetabolism to the left pleura, with new small left pleural effusion, suspicious for malignant effusion versus posttraumatic effusion. Pleural uptake may represent extramedullary myeloma extending along the intercostal space versus inflammation.    - BM bx 5/17/2021 with flow showing 4.0% plasma cells which express CD19 (dim), CD38 (bright), CD45 (dim), , and monotypic cytoplasmic lambda immunoglobulin light chains but lack CD20 and CD56.  Hypercellular bone marrow for age (approximately 75% cellularity) with adequate trilineage hematopoiesis. Plasma cell infiltrate: Interstitial, lambda monotypic and representing approximately 60% the bone marrow cellularity, by  immunohistochemical stain. Cytpgenetics with 2 related hypodiploid clones. One with loss of Y, monosomy 13 and 14 (5%) and one with translocation of 8p and 14, derivative 16 with long arm replaced by extra copy 1q,monosomy 21. FISH showed gain of 1q, loss of 13 and 14, IGH-MYC fusion t(8:14)    - Started Miracle-RVd 21 day with velcade on days 1,8,15.     -Cycle 2 Miracle-RVd. Dose reduce dex to 80 mg d/t fatigue and stomach upset on dex days. Also having cough with basilar streaking on CXR  cou- 8/31/2021 BM bx -rare suspicious plasma cells  in touch imprint. No increase in plasma cells by morph.    -8/31/2021 PET/CT Diffuse osteolytic lesions throughout the axial and appendicular skeleton. Increased sclerosis since prior exam 5/11/2021 without increased metabolism or size.  Hypermetabolic pretracheal lymph node as well as hypermetabolic level 2 lymph nodes within the right neck. These lymph nodes are likely reactive from autoimmune activation via medical therapy. Hypermetabolic subcentimeter nodules within the thyroid gland    - 11/11/2021 Autologous BMT    - 2/21/22 started maintenance with Revlimid 10 mg daily and daratumumab monthly; did Revlimid alone for C1 due to low IgG levels, added daratumumab starting C2      Interval Hx:   June is seen in clinic today for an acute add on. He continues on maintenance Revlimid 10 mg dialy and Daratumumab along with Zometa. Last Zometa and Daratumumab were on 04/11/23. For the past 2-3 days, he has been experiencing rigors throughout the day and night requiring wearing several layers and jackets. His temperature at home has been around 99 F. He has also developed shortness of breath over the past two days without associated chest pain or cough. Endorses 2-3 days of right lower quadrant pain and right sided low back pain. Endorses feeling very fatigued which is atypical for him at this point after his infusions. Denies urinary frequency, burning, or hematuria. No nausea or vomiting. No bowel changes. No headaches, dizziness, or syncope.     He last took tylenol this morning around 1 am.     14 comprehensive ROS neg    Physical Exam:  /83   Pulse 100   Temp (!) 101.9  F (38.8  C) (Tympanic)   Resp 18   SpO2 100%   General: ill appearing. Sitting upright in chair. Appears uncomfortable laying down on exam bed.   HEENT: Sclera anicteric. Oral mucosa pink and moist.  No mucositis or thrush  Lymph: No lymphadenopathy in neck  Heart: Regular, rate, and rhythm  Lungs: Clear to ascultation  bilaterally  Abdomen: Positive bowel sounds. Soft, non-distended. Tender to palpation in the RLQ. + Right sided CVA tenderness. No left sided CVA tenderness. No organomegaly or mass.   Extremities: no lower extremity edema  Neuro: Cranial nerves grossly intact  Rash: none    Assessment and Plan  Logan has multiple myeloma with high risk cytogenetics. He got Vidya-RVD with VGPR, then underwent autologous transplant 11/11/21. Continue acyclovir daily for viral ppx and bactrim M/T for PJP ppx. Given his high risk cytogenetics he started vidya + revlimid maintenance therapy 2/21/22. Follow IgG and replete if it remains <300. He had severe flu-like effects from his first dose of Zometa but he has been tolerating monthly Zometa dosed at 3 mg. Continue Vit D. His last IV daratumumab and zometa given on 04/11/23. Continues on PO revlimid daily.  - seen today for an acute add on for fevers.     Fevers  RLQ pain  Shortness of breath  - CBC with stable anemia. WBC, ANC, and platelets WNL. CMP significant for acute AZUCENA with a creatinine of 1.27, baseline creatinine appears to be around 1.08- 1.17.  - UA without signs of infection. Blood culture pending. Covid-19 PCR pending. Will obtain a lactic acid.   - Will obtain a CT scan of the chest, abdomen, and pelvis.   - Will administer 1 L NS and IV Zosyn 4.5 g x 1 dose. Discussed Zosyn dose with pharmacy. Attempted to direct admit patient. Unfortunately, it is unlikely he will get a bed tonight. Therefore, disposition will likely emergency department after supportive measures above and pending CT CAP results.      Hypogammaglobulinemia Start monthly IV Ig whenever IgG <300 (consider increasing to < 400 due to recurrent infections; awaiting today's result)    Normocytic Anemia: likely d/t revlimid and daratumumab. Will keep monitoring and dose reduce if needed.      120 minutes spent on the date of the encounter doing chart review, review of test results, patient visit, documentation  and discussion with other provider(s)     Yasmin Johnson PA-C    Preliminary CT scan results discussed with radiology: Right lower lobe infiltration with trace pleural effusions. No acute abdominal pathology.

## 2023-04-19 NOTE — PROGRESS NOTES
Infusion Nursing Note:  June Ronquillo presents today for 1L NS, IV Zosyn.    Patient seen by provider today: Yes: Yasmin CALVIN   present during visit today: Not Applicable.    Note: Pt presents to infusion feeling ok. Pt states 2/10 right abdomen/low back discomfort and denies acute complaints or concerns not addressed by [facility] today.     TORB. 4/19/2023. 1519. Yasmin CALVIN. Floyd Rivero RN. Obtain Lactic Acid before 1L NS.     VORB. 4/19/2023. 1645. Yasmin Rivero RN. Infuse NS at 500mls/hour. Obtain flu swab. Call [facility] for ER assessment for possible admission.     Report given to Jessica DIXON at [facility] whom accepted the remaining cares of pt-evaluation for possible admission for infectious symptoms    At 1720, pt states his right abdominal and low back discomfort has returned-6/10. Pt states he was feeling pretty good until he had some Pretzels and water-the discomfort has returned. Patient otherwise denies further issues. On-call [facility] Amber Scheierl PA made aware of patients symptoms- inquired about Amylase and Lipase labs.  TORB. 4/19/2023. 1725. Amber Scheierl PA. Floyd Rivero RN. Ok to add  On Amylase and Lipase labs.      Intravenous Access:  Peripheral IV placed.    Treatment Conditions:  Lab Results   Component Value Date    HGB 11.2 (L) 04/19/2023    WBC 8.2 04/19/2023    ANEU 4.7 03/13/2023    ANEUTAUTO 4.8 04/19/2023     04/19/2023      Lab Results   Component Value Date     04/19/2023    POTASSIUM 3.7 04/19/2023    MAG 1.9 11/10/2022    CR 1.27 (H) 04/19/2023    HARDEEP 9.0 04/19/2023    BILITOTAL 0.4 04/19/2023    ALBUMIN 4.1 04/19/2023    ALT 20 04/19/2023    AST 22 04/19/2023         Post Infusion Assessment:  Patient tolerated infusion without incident.  Blood return noted pre and post infusion.  Site patent and intact, free from redness, edema or discomfort.  No evidence of extravasations.       Discharge Plan:   Patient declined  prescription refills.  Discharge instructions reviewed with: Patient.  Patient and/or family verbalized understanding of discharge instructions and all questions answered.  AVS to patient via [employer] [employer].  Patient will return 5/8 for next appointment.   Patient discharged in stable condition accompanied by: [facility] at 1740  Departure Mode: Cart  Face to Face time: 10 minutes.      Floyd Rivero RN

## 2023-04-19 NOTE — TELEPHONE ENCOUNTER
BMT note    Received a page to discuss new symptoms; chills, fatigue, and MATAMOROS.     is a 52-year-old M with PMHx of high-risk MM s/p ASCT 11/11/2021 currently on lenalidomide and daratumumab maintenance. Also has hypogammaglobulinemia receiving IVIg PRN.    Reports 2 days history of chills (no fever, Tmax of 99F) with fatigue, MATAMOROS, R-sided stomach and back pain. Last CBC with mild anemia, otherwise normal blood counts, no evidence of MM recurrence.    His symptoms warrant further evaluation, although on a non-urgent basis. My suspicious is possible pneumonia (atypical), viral infection, or cytopenia related to treatment. I recommended to schedule a clinic visit with PCP or urgent care today for initial evaluation if unable to get in to oncology clinic.    Etta Peña MD  Hematology/Medical Oncology (PGY-4)  P: [phone]

## 2023-04-19 NOTE — LETTER
4/19/2023         RE: June Ronquillo  [address]  [place] [id number]        Dear Colleague,    Thank you for referring your patient, June Ronquillo, to the [facility]. Please see a copy of my visit note below.    Apr 19, 2023  Oncologic Hx:   - 4/30/2021 M spike of 34.8 in urine.M spike in blood 0.2; IgG 702 with depressed IgA and IgM. Beta 2 microglobulin 2.7. Lambda  with K/L ratio of 0.01. WBC 11.1 with absolute eos of 1.0. hgb, plt, Cr (1.1), and albumin WNL.    -4/30/2021 CT abd/pelvis showed sclerotic marrow changes with numerous lytic appearing lesions throughout the imaged portions of the spine, pelvis and ribs.      -PET scan 5/11/2021 Numerous osteolytic lesions which predominantly demonstrate uptake below liver background levels. There is one intraosseous lesion in the left lateral 9th rib that demonstrates uptake above background, possibly due to nondisplaced fracture. Adjacent to this lesion is mild  hypermetabolism to the left pleura, with new small left pleural effusion, suspicious for malignant effusion versus posttraumatic effusion. Pleural uptake may represent extramedullary myeloma extending along the intercostal space versus inflammation.    - BM bx 5/17/2021 with flow showing 4.0% plasma cells which express CD19 (dim), CD38 (bright), CD45 (dim), , and monotypic cytoplasmic lambda immunoglobulin light chains but lack CD20 and CD56.  Hypercellular bone marrow for age (approximately 75% cellularity) with adequate trilineage hematopoiesis. Plasma cell infiltrate: Interstitial, lambda monotypic and representing approximately 60% the bone marrow cellularity, by  immunohistochemical stain. Cytpgenetics with 2 related hypodiploid clones. One with loss of Y, monosomy 13 and 14 (5%) and one with translocation of 8p and 14, derivative 16 with long arm replaced by extra copy 1q,monosomy 21. FISH showed gain of 1q, loss of 13 and 14,  IGH-MYC fusion t(8:14)    - Started Miracle-RVd 21 day with velcade on days 1,8,15.     -Cycle 2 Miracle-RVd. Dose reduce dex to 80 mg d/t fatigue and stomach upset on dex days. Also having cough with basilar streaking on CXR  cou- 8/31/2021 BM bx -rare suspicious plasma cells in touch imprint. No increase in plasma cells by morph.    -8/31/2021 PET/CT Diffuse osteolytic lesions throughout the axial and appendicular skeleton. Increased sclerosis since prior exam 5/11/2021 without increased metabolism or size.  Hypermetabolic pretracheal lymph node as well as hypermetabolic level 2 lymph nodes within the right neck. These lymph nodes are likely reactive from autoimmune activation via medical therapy. Hypermetabolic subcentimeter nodules within the thyroid gland    - 11/11/2021 Autologous BMT    - 2/21/22 started maintenance with Revlimid 10 mg daily and daratumumab monthly; did Revlimid alone for C1 due to low IgG levels, added daratumumab starting C2      Interval Hx:   June is seen in clinic today for an acute add on. He continues on maintenance Revlimid 10 mg dialy and Daratumumab along with Zometa. Last Zometa and Daratumumab were on 04/11/23. For the past 2-3 days, he has been experiencing rigors throughout the day and night requiring wearing several layers and jackets. His temperature at home has been around 99 F. He has also developed shortness of breath over the past two days without associated chest pain or cough. Endorses 2-3 days of right lower quadrant pain and right sided low back pain. Endorses feeling very fatigued which is atypical for him at this point after his infusions. Denies urinary frequency, burning, or hematuria. No nausea or vomiting. No bowel changes. No headaches, dizziness, or syncope.     He last took tylenol this morning around 1 am.     14 comprehensive ROS neg    Physical Exam:  /83   Pulse 100   Temp (!) 101.9  F (38.8  C) (Tympanic)   Resp 18   SpO2 100%   General: ill  appearing. Sitting upright in chair. Appears uncomfortable laying down on exam bed.   HEENT: Sclera anicteric. Oral mucosa pink and moist.  No mucositis or thrush  Lymph: No lymphadenopathy in neck  Heart: Regular, rate, and rhythm  Lungs: Clear to ascultation bilaterally  Abdomen: Positive bowel sounds. Soft, non-distended. Tender to palpation in the RLQ. + Right sided CVA tenderness. No left sided CVA tenderness. No organomegaly or mass.   Extremities: no lower extremity edema  Neuro: Cranial nerves grossly intact  Rash: none    Assessment and Plan  Logan has multiple myeloma with high risk cytogenetics. He got Vidya-RVD with VGPR, then underwent autologous transplant 11/11/21. Continue acyclovir daily for viral ppx and bactrim M/T for PJP ppx. Given his high risk cytogenetics he started vidya + revlimid maintenance therapy 2/21/22. Follow IgG and replete if it remains <300. He had severe flu-like effects from his first dose of Zometa but he has been tolerating monthly Zometa dosed at 3 mg. Continue Vit D. His last IV daratumumab and zometa given on 04/11/23. Continues on PO revlimid daily.  - seen today for an acute add on for fevers.     Fevers  RLQ pain  Shortness of breath  - CBC with stable anemia. WBC, ANC, and platelets WNL. CMP significant for acute AZUCENA with a creatinine of 1.27, baseline creatinine appears to be around 1.08- 1.17.  - UA without signs of infection. Blood culture pending. Covid-19 PCR pending. Will obtain a lactic acid.   - Will obtain a CT scan of the chest, abdomen, and pelvis.   - Will administer 1 L NS and IV Zosyn 4.5 g x 1 dose. Discussed Zosyn dose with pharmacy. Attempted to direct admit patient. Unfortunately, it is unlikely he will get a bed tonight. Therefore, disposition will likely emergency department after supportive measures above and pending CT CAP results.      Hypogammaglobulinemia Start monthly IV Ig whenever IgG <300 (consider increasing to < 400 due to recurrent  infections; awaiting today's result)    Normocytic Anemia: likely d/t revlimid and daratumumab. Will keep monitoring and dose reduce if needed.      120 minutes spent on the date of the encounter doing chart review, review of test results, patient visit, documentation and discussion with other provider(s)     Yasmin Johnson PA-C    Preliminary CT scan results discussed with radiology: Right lower lobe infiltration with trace pleural effusions. No acute abdominal pathology.

## 2023-04-20 LAB
ANION GAP SERPL CALCULATED.3IONS-SCNC: 12 MMOL/L (ref 7–15)
ATRIAL RATE - MUSE: 93 BPM
BASOPHILS # BLD AUTO: 0 10E3/UL (ref 0–0.2)
BASOPHILS NFR BLD AUTO: 1 %
BUN SERPL-MCNC: 10.2 MG/DL (ref 6–20)
C DIFF TOX B STL QL: NEGATIVE
C PNEUM DNA SPEC QL NAA+PROBE: NOT DETECTED
CALCIUM SERPL-MCNC: 8.8 MG/DL (ref 8.6–10)
CHLORIDE SERPL-SCNC: 107 MMOL/L (ref 98–107)
CREAT SERPL-MCNC: 1.29 MG/DL (ref 0.67–1.17)
DEPRECATED HCO3 PLAS-SCNC: 21 MMOL/L (ref 22–29)
DIASTOLIC BLOOD PRESSURE - MUSE: NORMAL MMHG
EOSINOPHIL # BLD AUTO: 0.6 10E3/UL (ref 0–0.7)
EOSINOPHIL NFR BLD AUTO: 9 %
ERYTHROCYTE [DISTWIDTH] IN BLOOD BY AUTOMATED COUNT: 16.2 % (ref 10–15)
FLUAV H1 2009 PAND RNA SPEC QL NAA+PROBE: NOT DETECTED
FLUAV H1 RNA SPEC QL NAA+PROBE: NOT DETECTED
FLUAV H3 RNA SPEC QL NAA+PROBE: NOT DETECTED
FLUAV RNA SPEC QL NAA+PROBE: NOT DETECTED
FLUBV RNA SPEC QL NAA+PROBE: NOT DETECTED
GFR SERPL CREATININE-BSD FRML MDRD: 66 ML/MIN/1.73M2
GLUCOSE SERPL-MCNC: 107 MG/DL (ref 70–99)
HADV DNA SPEC QL NAA+PROBE: NOT DETECTED
HCOV PNL SPEC NAA+PROBE: NOT DETECTED
HCT VFR BLD AUTO: 34.9 % (ref 40–53)
HGB BLD-MCNC: 10.8 G/DL (ref 13.3–17.7)
HMPV RNA SPEC QL NAA+PROBE: NOT DETECTED
HOLD SPECIMEN: NORMAL
HPIV1 RNA SPEC QL NAA+PROBE: NOT DETECTED
HPIV2 RNA SPEC QL NAA+PROBE: NOT DETECTED
HPIV3 RNA SPEC QL NAA+PROBE: NOT DETECTED
HPIV4 RNA SPEC QL NAA+PROBE: NOT DETECTED
IMM GRANULOCYTES # BLD: 0 10E3/UL
IMM GRANULOCYTES NFR BLD: 0 %
INTERPRETATION ECG - MUSE: NORMAL
L PNEUMO1 AG UR QL IA: NEGATIVE
LYMPHOCYTES # BLD AUTO: 1.3 10E3/UL (ref 0.8–5.3)
LYMPHOCYTES NFR BLD AUTO: 21 %
M PNEUMO DNA SPEC QL NAA+PROBE: NOT DETECTED
MAGNESIUM SERPL-MCNC: 1.9 MG/DL (ref 1.7–2.3)
MCH RBC QN AUTO: 29.1 PG (ref 26.5–33)
MCHC RBC AUTO-ENTMCNC: 30.9 G/DL (ref 31.5–36.5)
MCV RBC AUTO: 94 FL (ref 78–100)
MONOCYTES # BLD AUTO: 1.1 10E3/UL (ref 0–1.3)
MONOCYTES NFR BLD AUTO: 17 %
NEUTROPHILS # BLD AUTO: 3.3 10E3/UL (ref 1.6–8.3)
NEUTROPHILS NFR BLD AUTO: 52 %
NRBC # BLD AUTO: 0 10E3/UL
NRBC BLD AUTO-RTO: 0 /100
P AXIS - MUSE: 52 DEGREES
PLATELET # BLD AUTO: 385 10E3/UL (ref 150–450)
POTASSIUM SERPL-SCNC: 3.7 MMOL/L (ref 3.4–5.3)
PR INTERVAL - MUSE: 132 MS
PROT PATTERN SERPL IFE-IMP: NORMAL
QRS DURATION - MUSE: 72 MS
QT - MUSE: 360 MS
QTC - MUSE: 447 MS
R AXIS - MUSE: 5 DEGREES
RBC # BLD AUTO: 3.71 10E6/UL (ref 4.4–5.9)
RSV RNA SPEC QL NAA+PROBE: NOT DETECTED
RSV RNA SPEC QL NAA+PROBE: NOT DETECTED
RV+EV RNA SPEC QL NAA+PROBE: NOT DETECTED
S PNEUM AG SPEC QL: NEGATIVE
SODIUM SERPL-SCNC: 140 MMOL/L (ref 136–145)
SYSTOLIC BLOOD PRESSURE - MUSE: NORMAL MMHG
T AXIS - MUSE: 4 DEGREES
VENTRICULAR RATE- MUSE: 93 BPM
WBC # BLD AUTO: 6.3 10E3/UL (ref 4–11)

## 2023-04-20 PROCEDURE — 87486 CHLMYD PNEUM DNA AMP PROBE: CPT | Performed by: PHYSICIAN ASSISTANT

## 2023-04-20 PROCEDURE — 87305 ASPERGILLUS AG IA: CPT | Performed by: STUDENT IN AN ORGANIZED HEALTH CARE EDUCATION/TRAINING PROGRAM

## 2023-04-20 PROCEDURE — 250N000013 HC RX MED GY IP 250 OP 250 PS 637: Performed by: STUDENT IN AN ORGANIZED HEALTH CARE EDUCATION/TRAINING PROGRAM

## 2023-04-20 PROCEDURE — 83735 ASSAY OF MAGNESIUM: CPT | Performed by: STUDENT IN AN ORGANIZED HEALTH CARE EDUCATION/TRAINING PROGRAM

## 2023-04-20 PROCEDURE — 87899 AGENT NOS ASSAY W/OPTIC: CPT | Performed by: PHYSICIAN ASSISTANT

## 2023-04-20 PROCEDURE — 250N000013 HC RX MED GY IP 250 OP 250 PS 637: Performed by: PHYSICIAN ASSISTANT

## 2023-04-20 PROCEDURE — 99232 SBSQ HOSP IP/OBS MODERATE 35: CPT | Performed by: PHYSICIAN ASSISTANT

## 2023-04-20 PROCEDURE — 80048 BASIC METABOLIC PNL TOTAL CA: CPT | Performed by: STUDENT IN AN ORGANIZED HEALTH CARE EDUCATION/TRAINING PROGRAM

## 2023-04-20 PROCEDURE — 120N000005 HC R&B MS OVERFLOW UMMC

## 2023-04-20 PROCEDURE — 87493 C DIFF AMPLIFIED PROBE: CPT | Performed by: STUDENT IN AN ORGANIZED HEALTH CARE EDUCATION/TRAINING PROGRAM

## 2023-04-20 PROCEDURE — 250N000011 HC RX IP 250 OP 636: Performed by: STUDENT IN AN ORGANIZED HEALTH CARE EDUCATION/TRAINING PROGRAM

## 2023-04-20 PROCEDURE — 85025 COMPLETE CBC W/AUTO DIFF WBC: CPT | Performed by: STUDENT IN AN ORGANIZED HEALTH CARE EDUCATION/TRAINING PROGRAM

## 2023-04-20 PROCEDURE — 36415 COLL VENOUS BLD VENIPUNCTURE: CPT | Performed by: STUDENT IN AN ORGANIZED HEALTH CARE EDUCATION/TRAINING PROGRAM

## 2023-04-20 RX ORDER — BACLOFEN 10 MG/1
5 TABLET ORAL 3 TIMES DAILY PRN
Status: DISCONTINUED | OUTPATIENT
Start: 2023-04-20 | End: 2023-04-20

## 2023-04-20 RX ORDER — BACLOFEN 10 MG/1
10 TABLET ORAL 3 TIMES DAILY PRN
Status: DISCONTINUED | OUTPATIENT
Start: 2023-04-20 | End: 2023-04-21 | Stop reason: HOSPADM

## 2023-04-20 RX ORDER — LENALIDOMIDE 10 MG/1
10 CAPSULE ORAL DAILY
Qty: 28 CAPSULE | Refills: 0 | Status: SHIPPED | OUTPATIENT
Start: 2023-04-20 | End: 2023-06-05

## 2023-04-20 RX ORDER — LEVOFLOXACIN 750 MG/1
750 TABLET, FILM COATED ORAL DAILY
Qty: 6 TABLET | Refills: 0 | Status: SHIPPED | OUTPATIENT
Start: 2023-04-22 | End: 2023-04-28

## 2023-04-20 RX ORDER — BACLOFEN 10 MG/1
10 TABLET ORAL 3 TIMES DAILY PRN
Qty: 30 TABLET | Refills: 0 | Status: SHIPPED | OUTPATIENT
Start: 2023-04-20 | End: 2023-06-05

## 2023-04-20 RX ADMIN — ASPIRIN 81 MG: 81 TABLET ORAL at 09:09

## 2023-04-20 RX ADMIN — ACETAMINOPHEN 500 MG: 500 TABLET ORAL at 00:03

## 2023-04-20 RX ADMIN — SENNOSIDES AND DOCUSATE SODIUM 1 TABLET: 50; 8.6 TABLET ORAL at 09:11

## 2023-04-20 RX ADMIN — ACETAMINOPHEN 500 MG: 500 TABLET ORAL at 07:09

## 2023-04-20 RX ADMIN — PIPERACILLIN SODIUM AND TAZOBACTAM SODIUM 4.5 G: 4; .5 INJECTION, POWDER, LYOPHILIZED, FOR SOLUTION INTRAVENOUS at 10:57

## 2023-04-20 RX ADMIN — OXYCODONE HYDROCHLORIDE 5 MG: 5 TABLET ORAL at 10:57

## 2023-04-20 RX ADMIN — OXYCODONE HYDROCHLORIDE 5 MG: 5 TABLET ORAL at 21:27

## 2023-04-20 RX ADMIN — PIPERACILLIN SODIUM AND TAZOBACTAM SODIUM 4.5 G: 4; .5 INJECTION, POWDER, LYOPHILIZED, FOR SOLUTION INTRAVENOUS at 23:47

## 2023-04-20 RX ADMIN — HYDROMORPHONE HYDROCHLORIDE 0.2 MG: 0.2 INJECTION, SOLUTION INTRAMUSCULAR; INTRAVENOUS; SUBCUTANEOUS at 07:09

## 2023-04-20 RX ADMIN — BACLOFEN 10 MG: 10 TABLET ORAL at 16:15

## 2023-04-20 RX ADMIN — LEVOTHYROXINE SODIUM 125 MCG: 0.03 TABLET ORAL at 09:09

## 2023-04-20 RX ADMIN — ACETAMINOPHEN 500 MG: 500 TABLET ORAL at 10:57

## 2023-04-20 RX ADMIN — ACYCLOVIR 800 MG: 800 TABLET ORAL at 21:43

## 2023-04-20 RX ADMIN — LOSARTAN POTASSIUM 50 MG: 50 TABLET, FILM COATED ORAL at 09:09

## 2023-04-20 RX ADMIN — ROSUVASTATIN CALCIUM 40 MG: 10 TABLET, FILM COATED ORAL at 09:08

## 2023-04-20 RX ADMIN — PIPERACILLIN SODIUM AND TAZOBACTAM SODIUM 4.5 G: 4; .5 INJECTION, POWDER, LYOPHILIZED, FOR SOLUTION INTRAVENOUS at 06:17

## 2023-04-20 RX ADMIN — CALCIUM CARBONATE 1000 MG: 500 TABLET, CHEWABLE ORAL at 09:08

## 2023-04-20 RX ADMIN — ACETAMINOPHEN 500 MG: 500 TABLET ORAL at 21:27

## 2023-04-20 RX ADMIN — ENOXAPARIN SODIUM 30 MG: 30 INJECTION SUBCUTANEOUS at 21:45

## 2023-04-20 RX ADMIN — POTASSIUM CHLORIDE 20 MEQ: 750 TABLET, EXTENDED RELEASE ORAL at 09:09

## 2023-04-20 RX ADMIN — ACYCLOVIR 800 MG: 800 TABLET ORAL at 09:09

## 2023-04-20 RX ADMIN — CALCIUM CARBONATE 1000 MG: 500 TABLET, CHEWABLE ORAL at 13:11

## 2023-04-20 RX ADMIN — PIPERACILLIN SODIUM AND TAZOBACTAM SODIUM 4.5 G: 4; .5 INJECTION, POWDER, LYOPHILIZED, FOR SOLUTION INTRAVENOUS at 17:34

## 2023-04-20 ASSESSMENT — ACTIVITIES OF DAILY LIVING (ADL)
ADLS_ACUITY_SCORE: 20
ADLS_ACUITY_SCORE: 18
ADLS_ACUITY_SCORE: 20
ADLS_ACUITY_SCORE: 18
ADLS_ACUITY_SCORE: 20
ADLS_ACUITY_SCORE: 20

## 2023-04-20 NOTE — PROGRESS NOTES
Admission   Diagnosis: Chills  Admitted from: [facility]   Via: stretcher   Accompanied by: wife  Belongings: Placed in closet; valuables sent home with family   Admission paperwork: complete   Teaching: Call don't fall, use of console, meal times, visiting hours, orientation to unit, when to call for the RN (angina/sob/dizzyness, etc.), and the importance of safety.   Access: PIV   Ht./Wt.: complete     Two nurse head-to-toe skin assessment performed by Kip Mejia and Gabe Sparrow.  Skin issues noted: none.  See PCS for assessment and treatment of wounds and surgical sites.

## 2023-04-20 NOTE — PLAN OF CARE
"Hours of care: [id number]    Neuro: A&Ox4.   Cardiac: Afebrile, VSS.   Respiratory: RA, lung sounds clear, denies SOB  GI/: Voiding spontaneously. No BM this shift.  Diet/appetite: Tolerating regular diet . Denies nausea.  Activity: Up with standby assist    Pain: C/o low back pain, controlled with PRN medications  Skin: No new deficits noted.  Lines: L PIV, LR running @ 100mL/hr  Drains: none  Replacements: none    Plan: Continue with current POC. Report changes to primary team.       Problem: Plan of Care - These are the overarching goals to be used throughout the patient stay.    Goal: Plan of Care Review  Description: The Plan of Care Review/Shift note should be completed every shift.  The Outcome Evaluation is a brief statement about your assessment that the patient is improving, declining, or no change.  This information will be displayed automatically on your shift note.  Outcome: Progressing  Flowsheets (Taken 4/19/2023 2241)  Plan of Care Reviewed With:    patient    spouse  Overall Patient Progress: no change  Goal: Patient-Specific Goal (Individualized)  Description: You can add care plan individualizations to a care plan. Examples of Individualization might be:  \"Parent requests to be called daily at 9am for status\", \"I have a hard time hearing out of my right ear\", or \"Do not touch me to wake me up as it startles me\".  Outcome: Progressing  Goal: Absence of Hospital-Acquired Illness or Injury  Outcome: Progressing  Intervention: Identify and Manage Fall Risk  Recent Flowsheet Documentation  Taken 4/19/2023 2200 by Kip Mejia, RN  Safety Promotion/Fall Prevention:    nonskid shoes/slippers when out of bed    lighting adjusted    clutter free environment maintained  Intervention: Prevent Skin Injury  Recent Flowsheet Documentation  Taken 4/19/2023 2200 by Kip Mejia, RN  Body Position: position changed independently  Goal: Optimal Comfort and Wellbeing  Outcome: Progressing  Intervention: " Monitor Pain and Promote Comfort  Recent Flowsheet Documentation  Taken 4/19/2023 2139 by Kip Mejia RN  Pain Management Interventions: medication (see MAR)  Goal: Readiness for Transition of Care  Outcome: Progressing  Intervention: Mutually Develop Transition Plan  Recent Flowsheet Documentation  Taken 4/19/2023 2100 by Kip Mejia RN  Equipment Currently Used at Home: none     Problem: Stem Cell/Bone Marrow Transplant  Goal: Optimal Coping with Transplant  Outcome: Progressing  Goal: Symptom-Free Urinary Elimination  Outcome: Progressing  Intervention: Prevent or Manage Bladder Irritation  Recent Flowsheet Documentation  Taken 4/19/2023 2139 by Kip Mejia RN  Pain Management Interventions: medication (see MAR)  Goal: Diarrhea Symptom Control  Outcome: Progressing  Goal: Improved Activity Tolerance  Outcome: Progressing  Intervention: Promote Improved Energy  Recent Flowsheet Documentation  Taken 4/19/2023 2200 by Kip Mejia RN  Activity Management: activity adjusted per tolerance  Goal: Blood Counts Within Acceptable Range  Outcome: Progressing  Intervention: Monitor and Manage Hematologic Symptoms  Recent Flowsheet Documentation  Taken 4/19/2023 2200 by Kip Mejia RN  Medication Review/Management: medications reviewed  Goal: Absence of Hypersensitivity Reaction  Outcome: Progressing  Goal: Absence of Infection  Outcome: Progressing  Goal: Improved Oral Mucous Membrane Health and Integrity  Outcome: Progressing  Goal: Nausea and Vomiting Symptom Relief  Outcome: Progressing  Goal: Optimal Nutrition Intake  Outcome: Progressing     Problem: Pain Acute  Goal: Optimal Pain Control and Function  Outcome: Progressing  Intervention: Develop Pain Management Plan  Recent Flowsheet Documentation  Taken 4/19/2023 2139 by Kip Mejia RN  Pain Management Interventions: medication (see MAR)  Intervention: Prevent or Manage Pain  Recent Flowsheet Documentation  Taken 4/19/2023 2200  by Kip Mejia, RN  Medication Review/Management: medications reviewed   Goal Outcome Evaluation:      Plan of Care Reviewed With: patient, spouse    Overall Patient Progress: no changeOverall Patient Progress: no change

## 2023-04-20 NOTE — PROGRESS NOTES
[facility]    Hematology / Oncology Progress Note    Date of Service (when I saw the patient): 04/20/2023     Assessment & Plan   June Ronquillo is a 52 year old male admitted on 4/19/2023. He has a history of multiple myeloma with high risk cytogenetics s/p autoBMT now on maintenance therapy with daratumumab, zometa, and revlimid presenting with fever, rigors, chest pain, and shortness of breath.     Today:   - Continue zosyn for today; plan to transition to levaquin in AM and discharge if remains afebrile.   - Plan for additional 7 day course of Levaquin after Zosyn due to immunosuppression  - HOLD Revlimid until follow up in clinic  - Baclofen for muscle spasms.      # Fever and rigors  # Sepsis considered and ruled out  # RLL consolidation, pneumonia  Patient was noted to have a right upper lobe groundglass opacity in December and had been planning to see pulmonology in April 2023.  However, he also developed severe cough in January and February that is somewhat resolved. In the past 2 days, noted to have significant rigors, chest pain, shortness of breath, and fatigue. Imaging reveals more consolidation in the right lower lobe along with mediastinal lymphadenopathy. While an underlying, progressive concern such as invasive fungal disease or mycobacterial disease is possible, the more acute syndrome the last few days seems more consistent with a typical bacterial pneumonia. Procal 0.21. CT chest with RLL consolidation and likely reactive lymphadenopathy.   - BCx (4/18) NGTD.  - UCx (4/18) NGTD.   - RVP, influenza, RSV, covid negative.   - Strep pneumo, legionella negative.  - galactomannan, fungitell  - Continue piperacillin/tazobactam 4.5 g every 6 hours (4/18 - 4/20); anticipate transition to levaquin in AM.   - Oxycodone PRN for chest pain, baclofen PRN for muscle spasms.    # ID prophylaxis   - Continue PTA acyclovir.   - PTA bactrim stopped in 12/2022 for  unclear reasons - will seek clarification with outpatient team as per OP notes patient should still be on this medication.      # Myeloma with high risk features   Follows with Dr. Vazquez. See OP notes for full details. 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 Given his high risk cytogenetics he started miracle + revlimid maintenance therapy 2/21/22. He had severe flu-like effects from his first dose of Zometa but he has been tolerating monthly Zometa dosed at 3 mg. His last IV daratumumab and zometa given on 04/11/23. Continues on PO revlimid daily. Most recent IgG (4/11) of 468.  - HOLD Revlimid 10 mg daily until follow up in clinic    # Anemia  Secondary to miracle, revlimid.  - Continue to monitor.  - Transfuse for hgb <7    Clinically Significant Risk Factors Present on Admission          # Hypocalcemia: Lowest Ca = 8.4 mg/dL in last 2 days, will monitor and replace as appropriate         # Hypertension: home medication list includes antihypertensive(s)              Dispo: Admitted for pneumonia; discharge anticipated 4/21.   Follow-Up: [facility] requested for next week .      Patient seen and discussed with attending physician, Dr. Gabriel.      I spent 45 minutes in the care of this patient today, which included time necessary for review of interval events, obtaining history and physical exam, ordering medications/tests/procedures as medically indicated, review of pertinent medical literature, counseling of the patient, coordination of care, and documentation time. Over 50% of time was spent counseling the patient and/or coordinating care.    Citlalli Leblanc PA-C   Hematology/Oncology   Pager: #[phone]    Interval History   Logan is feeling ok today. No cough, or sputum production. Fevers resolved. Having back muscle spasm pain. Trial Baclofen. No other concerns or symptoms,  Appetite intact.   Denies fever, chills, mouth sores, SOB, cough, abdominal pain, diarrhea, constipation,  nausea, vomiting, dysuria, hematuria, numbness, tingling, swelling      Physical Exam   Temp: 97.7  F (36.5  C) Temp src: Oral BP: 109/69 Pulse: 71   Resp: 16 SpO2: 99 % O2 Device: None (Room air)    Vitals:    04/19/23 2009 04/19/23 2139 04/20/23 0832   Weight: 72.6 kg (160 lb) 67.4 kg (148 lb 9.4 oz) 66.4 kg (146 lb 6.4 oz)     Vital Signs with Ranges  Temp:  [97.7  F (36.5  C)-99  F (37.2  C)] 97.7  F (36.5  C)  Pulse:  [69-99] 71  Resp:  [16-25] 16  BP: (101-137)/(69-89) 109/69  SpO2:  [97 %-100 %] 99 %  I/O last 3 completed shifts:  In: 1700 [P.O.:900; I.V.:800]  Out: 2250 [Urine:2250]    Constitutional: Pleasant male lying in bed. Awake and conversational. Non- toxic appearing. No acute distress.   HEENT: Normocephalic, atraumatic. Sclerae anicteric. EOM intact. Moist mucus membranes   Respiratory: Breathing comfortable with no increased work on room air. No signs of respiratory distress or accessory muscle use. Crackles noted at right lung base.   Cardiovascular: Regular rate and rhythm. Normal S1 and S2. No murmurs, rubs, or gallops. No peripheral edema.    GI: Abdomen is soft, non-distended, non-tender and without distension. Bowel sounds present and normoactive.  Skin: Skin is clean, dry, intact. No jaundice appreciated.   Musculoskeletal/ Extremities: No redness, warmth, or swelling of the joints appreciated. Distal pulses palpable. Nailbeds pink and without cyanosis or clubbing.   Neurologic: Alert and oriented. Speech normal. Grossly nonfocal. Memory and thought process preserved.   Neuropsychiatric: Calm, affect congruent to situation. Appropriate mood and affect. Good judgment and insight. No visual/auditory hallucinations.    Medications       acyclovir  800 mg Oral BID     aspirin  81 mg Oral Daily     calcium carbonate  1,000 mg Oral TID     enoxaparin ANTICOAGULANT  30 mg Subcutaneous QPM     [Held by provider] LENalidomide  10 mg Oral Daily     levothyroxine  125 mcg Oral Daily     losartan  50  mg Oral Daily     piperacillin-tazobactam  4.5 g Intravenous Q6H     potassium chloride ER  20 mEq Oral Daily     rosuvastatin  40 mg Oral Daily     senna-docusate  1 tablet Oral BID    Or     senna-docusate  2 tablet Oral BID     sodium chloride (PF)  3 mL Intracatheter Q8H       Data   Results for orders placed or performed during the hospital encounter of 04/19/23 (from the past 24 hour(s))   EKG 12-lead, tracing only   Result Value Ref Range    Systolic Blood Pressure  mmHg    Diastolic Blood Pressure  mmHg    Ventricular Rate 93 BPM    Atrial Rate 93 BPM    TX Interval 132 ms    QRS Duration 72 ms     ms    QTc 447 ms    P Axis 52 degrees    R AXIS 5 degrees    T Axis 4 degrees    Interpretation ECG       Sinus rhythm  Normal ECG  Unconfirmed report - interpretation of this ECG is computer generated - see medical record for final interpretation  Confirmed by - EMERGENCY ROOM, PHYSICIAN ([id number]),  CHRISTOS HICKEY ([id number]) on 4/20/2023 2:57:00 PM     CBC with platelets differential    Narrative    The following orders were created for panel order CBC with platelets differential.  Procedure                               Abnormality         Status                     ---------                               -----------         ------                     CBC with platelets and d...[[id number]]  Abnormal            Final result                 Please view results for these tests on the individual orders.   Comprehensive metabolic panel   Result Value Ref Range    Sodium 136 136 - 145 mmol/L    Potassium 3.4 3.4 - 5.3 mmol/L    Chloride 105 98 - 107 mmol/L    Carbon Dioxide (CO2) 19 (L) 22 - 29 mmol/L    Anion Gap 12 7 - 15 mmol/L    Urea Nitrogen 10.5 6.0 - 20.0 mg/dL    Creatinine 1.24 (H) 0.67 - 1.17 mg/dL    Calcium 8.4 (L) 8.6 - 10.0 mg/dL    Glucose 119 (H) 70 - 99 mg/dL    Alkaline Phosphatase 68 40 - 129 U/L    AST 23 10 - 50 U/L    ALT 16 10 - 50 U/L    Protein Total 6.3 (L) 6.4 - 8.3 g/dL    Albumin  3.8 3.5 - 5.2 g/dL    Bilirubin Total 0.5 <=1.2 mg/dL    GFR Estimate 69 >60 mL/min/1.73m2   Lipase   Result Value Ref Range    Lipase 21 13 - 60 U/L   Lactic acid whole blood   Result Value Ref Range    Lactic Acid 0.6 (L) 0.7 - 2.0 mmol/L   Troponin T, High Sensitivity   Result Value Ref Range    Troponin T, High Sensitivity 9 <=22 ng/L   Blood Culture Peripheral Blood    Specimen: Peripheral Blood   Result Value Ref Range    Culture No growth after 12 hours    CBC with platelets and differential   Result Value Ref Range    WBC Count 7.6 4.0 - 11.0 10e3/uL    RBC Count 3.47 (L) 4.40 - 5.90 10e6/uL    Hemoglobin 10.3 (L) 13.3 - 17.7 g/dL    Hematocrit 32.2 (L) 40.0 - 53.0 %    MCV 93 78 - 100 fL    MCH 29.7 26.5 - 33.0 pg    MCHC 32.0 31.5 - 36.5 g/dL    RDW 16.2 (H) 10.0 - 15.0 %    Platelet Count 377 150 - 450 10e3/uL    % Neutrophils 56 %    % Lymphocytes 21 %    % Monocytes 18 %    % Eosinophils 5 %    % Basophils 0 %    % Immature Granulocytes 0 %    NRBCs per 100 WBC 0 <1 /100    Absolute Neutrophils 4.3 1.6 - 8.3 10e3/uL    Absolute Lymphocytes 1.6 0.8 - 5.3 10e3/uL    Absolute Monocytes 1.4 (H) 0.0 - 1.3 10e3/uL    Absolute Eosinophils 0.4 0.0 - 0.7 10e3/uL    Absolute Basophils 0.0 0.0 - 0.2 10e3/uL    Absolute Immature Granulocytes 0.0 <=0.4 10e3/uL    Absolute NRBCs 0.0 10e3/uL   Procalcitonin   Result Value Ref Range    Procalcitonin 0.21 (H) <0.05 ng/mL   Symptomatic Influenza A/B, RSV, & SARS-CoV2 PCR (COVID-19) Nasopharyngeal    Specimen: Nasopharyngeal; Swab   Result Value Ref Range    Influenza A PCR Negative Negative    Influenza B PCR Negative Negative    RSV PCR Negative Negative    SARS CoV2 PCR Negative Negative    Narrative    Testing was performed using the Xpert Xpress CoV2/Flu/RSV Assay on the [employer] Instrument. This test should be ordered for the detection of SARS-CoV-2, influenza, and RSV viruses in individuals who meet clinical and/or epidemiological criteria. Test  performance is unknown in asymptomatic patients. This test is for in vitro diagnostic use under the FDA EUA for laboratories certified under CLIA to perform high or moderate complexity testing. This test has not been FDA cleared or approved. A negative result does not rule out the presence of PCR inhibitors in the specimen or target RNA in concentration below the limit of detection for the assay. If only one viral target is positive but coinfection with multiple targets is suspected, the sample should be re-tested with another FDA cleared, approved, or authorized test, if coinfection would change clinical management. This test was validated by the [facility]. These laboratories are certified under the Clinical Laboratory Improvement Amendments of 1988 (CLIA-88) as qualified to perform high complexity laboratory testing.   Blood Culture Peripheral Blood    Specimen: Peripheral Blood   Result Value Ref Range    Culture No growth after 12 hours    Simms Draw    Narrative    The following orders were created for panel order Simms Draw.  Procedure                               Abnormality         Status                     ---------                               -----------         ------                     Extra Blue Top Tube[[id number]]                              Final result               Extra Red Top Tube[[id number]]                               Final result                 Please view results for these tests on the individual orders.   Extra Blue Top Tube   Result Value Ref Range    Hold Specimen JIC    Extra Red Top Tube   Result Value Ref Range    Hold Specimen JIC    Abdomen US, limited (RUQ only)    Narrative    EXAMINATION: US ABDOMEN LIMITED 4/19/2023 7:23 PM     COMPARISON: CT chest/abdomen/pelvis from 1605 hours    HISTORY: 52 years Male RUQ tenderness, subjective fevers, hx multiple  myeloma    TECHNIQUE: The abdomen was scanned in standard fashion with  specialized ultrasound  transducer(s) using both grey scale and limited  color Doppler techniques.    FINDINGS:   Fluid: No evidence of ascites or pleural effusions.    Liver: The liver demonstrates normal echotexture, measuring 15.0 cm in  craniocaudal dimension. There is no focal mass. No intrahepatic  biliary dilatation. Main portal vein is patent with hepatopedal flow.    Gallbladder: There is no wall thickening, pericholecystic fluid,  positive sonographic Chambers's sign or evidence for cholelithiasis.    Bile Ducts: Both the intra- and extrahepatic biliary system are of  normal caliber.  The common bile duct measures 5 mm in diameter.    Pancreas: Partially obscured. Visualized portions of the head and body  of the pancreas are unremarkable.     Kidney: The right kidney measures 10.3 cm long. There is no  hydronephrosis or hydroureter, no shadowing renal calculi, cystic  lesion or mass.     Aorta and IVC: The visualized portions of the aorta and IVC are  unremarkable.       Impression    IMPRESSION:   1.  Normal right upper quadrant ultrasound. No sonographic evidence of  cholecystitis.    I have personally reviewed the examination and initial interpretation  and I agree with the findings.    RICHIE ARBOLEDA MD         SYSTEM ID:  [id number]   Creatinine   Result Value Ref Range    Creatinine 1.23 (H) 0.67 - 1.17 mg/dL    GFR Estimate 70 >60 mL/min/1.73m2   Lactate Dehydrogenase   Result Value Ref Range    Lactate Dehydrogenase 72 0 - 250 U/L   CBC with platelets differential    Narrative    The following orders were created for panel order CBC with platelets differential.  Procedure                               Abnormality         Status                     ---------                               -----------         ------                     CBC with platelets and d...[[id number]]  Abnormal            Final result                 Please view results for these tests on the individual orders.   Basic metabolic panel   Result Value  Ref Range    Sodium 140 136 - 145 mmol/L    Potassium 3.7 3.4 - 5.3 mmol/L    Chloride 107 98 - 107 mmol/L    Carbon Dioxide (CO2) 21 (L) 22 - 29 mmol/L    Anion Gap 12 7 - 15 mmol/L    Urea Nitrogen 10.2 6.0 - 20.0 mg/dL    Creatinine 1.29 (H) 0.67 - 1.17 mg/dL    Calcium 8.8 8.6 - 10.0 mg/dL    Glucose 107 (H) 70 - 99 mg/dL    GFR Estimate 66 >60 mL/min/1.73m2   CBC with platelets and differential   Result Value Ref Range    WBC Count 6.3 4.0 - 11.0 10e3/uL    RBC Count 3.71 (L) 4.40 - 5.90 10e6/uL    Hemoglobin 10.8 (L) 13.3 - 17.7 g/dL    Hematocrit 34.9 (L) 40.0 - 53.0 %    MCV 94 78 - 100 fL    MCH 29.1 26.5 - 33.0 pg    MCHC 30.9 (L) 31.5 - 36.5 g/dL    RDW 16.2 (H) 10.0 - 15.0 %    Platelet Count 385 150 - 450 10e3/uL    % Neutrophils 52 %    % Lymphocytes 21 %    % Monocytes 17 %    % Eosinophils 9 %    % Basophils 1 %    % Immature Granulocytes 0 %    NRBCs per 100 WBC 0 <1 /100    Absolute Neutrophils 3.3 1.6 - 8.3 10e3/uL    Absolute Lymphocytes 1.3 0.8 - 5.3 10e3/uL    Absolute Monocytes 1.1 0.0 - 1.3 10e3/uL    Absolute Eosinophils 0.6 0.0 - 0.7 10e3/uL    Absolute Basophils 0.0 0.0 - 0.2 10e3/uL    Absolute Immature Granulocytes 0.0 <=0.4 10e3/uL    Absolute NRBCs 0.0 10e3/uL   Magnesium   Result Value Ref Range    Magnesium 1.9 1.7 - 2.3 mg/dL   Streptococcus pneumoniae antigen    Specimen: Urine, NOS   Result Value Ref Range    Streptococcus pneumoniae antigen Negative Negative   Legionella pneumophila antigen urine    Specimen: Urine, NOS   Result Value Ref Range    Legionella pneumophila serogroup 1 urinary antigen Negative Negative   Respiratory Panel PCR    Specimen: Nasopharyngeal; Swab   Result Value Ref Range    Adenovirus Not Detected Not Detected    Coronavirus Not Detected Not Detected    Human Metapneumovirus Not Detected Not Detected    Human Rhin/Enterovirus Not Detected Not Detected    Influenza A Not Detected Not Detected    Influenza A, H1 Not Detected Not Detected    Influenza A  2009 H1N1 Not Detected Not Detected    Influenza A, H3 Not Detected Not Detected    Influenza B Not Detected Not Detected    Parainfluenza Virus 1 Not Detected Not Detected    Parainfluenza Virus 2 Not Detected Not Detected    Parainfluenza Virus 3 Not Detected Not Detected    Parainfluenza Virus 4 Not Detected Not Detected    Respiratory Syncytial Virus A Not Detected Not Detected    Respiratory Syncytial Virus B Not Detected Not Detected    Chlamydia Pneumoniae Not Detected Not Detected    Mycoplasma Pneumoniae Not Detected Not Detected    Narrative    The [employer] Respiratory Panel is a qualitative nucleic acid, multiplex, in vitro diagnostic test for the simultaneous detection and identification of multiple respiratory viral and bacterial nucleic acids in nasopharyngeal swabs collected in viral transport media from individual exhibiting signs and symptoms of respiratory infection. The assay has received FDA approval for the testing of nasopharyngeal (NP) swabs only. The [facility] at [facility] has validated the performance characteristics for bronchial alveolar lavage specimens. This test is used for clinical purposes and should not be regarded as investigational or for research. This laboratory is certified under the Clinical Laboratory Improvement Amendments of 1988 (CLIA-88) as qualified to perform high complexity clinical laboratory testing.      *Note: Due to a large number of results and/or encounters for the requested time period, some results have not been displayed. A complete set of results can be found in Results Review.

## 2023-04-20 NOTE — PHARMACY-ADMISSION MEDICATION HISTORY
Pharmacist Admission Medication History    Admission medication history is complete. The information provided in this note is only as accurate as the sources available at the time of the update.    Medication reconciliation/reorder completed by provider prior to medication history? Yes    Information Source(s): Patient and CareEverywhere/SureScripts via phone    Pertinent Information: Patient is a good historian.     Patient has his home supply of Revlimid in the room with him.     Per patient, Bactrim ppx was stopped by his provider in December 2022.     Changes made to PTA medication list:    Added: None    Deleted: Flonase and meclizine- per pt no longer taking    Changed: None       Allergies reviewed with patient and updates made in EHR: yes    Medication History Completed By: Lakeisha Mcdowell [facility] 4/20/2023 1:25 PM    Prior to Admission medications    Medication Sig Last Dose Taking? Auth Provider Long Term End Date   acetaminophen (TYLENOL) 500 MG tablet Take 500-1,000 mg by mouth every 6 hours as needed for mild pain 4/19/2023 Yes Reported, Patient     acyclovir (ZOVIRAX) 800 MG tablet TAKE ONE TABLET BY MOUTH TWICE A DAY 4/19/2023 Yes Laura Ballesteros APRN CNP Yes    aspirin (ASA) 81 MG EC tablet Take 1 tablet (81 mg) by mouth daily 4/19/2023 Yes Brandon Rendon MD No    calcium carb-cholecalciferol (OS-HARDEEP) 500-200 MG-UNIT tablet Take 1 tablet by mouth daily 4/19/2023 Yes Laura Ballesteros APRN CNP     calcium carbonate (TUMS) 500 MG chewable tablet Take 2 tablets (1,000 mg) by mouth 3 times daily 4/19/2023 Yes Clarita Sepulveda MD     LENalidomide (REVLIMID) 10 MG CAPS capsule Take 1 capsule (10 mg) by mouth daily 4/19/2023 Yes Barbi Vazquez MD Yes Patient supplied (has in room)   levothyroxine (SYNTHROID/LEVOTHROID) 125 MCG tablet Take 1 tablet (125 mcg) by mouth daily 4/19/2023 Yes Makeda Elder MD Yes    losartan (COZAAR) 50 MG tablet Take 1 tablet (50 mg) by mouth daily 4/19/2023 Yes  Dominguez Craig MD Yes    potassium chloride ER (KLOR-CON M) 10 MEQ CR tablet Take 2 tablets (20 mEq) by mouth daily 4/19/2023 Yes Laura Ballesteros APRN CNP     rosuvastatin (CRESTOR) 40 MG tablet Take 1 tablet (40 mg) by mouth daily 4/19/2023 Yes Dominguez Craig MD Yes    vitamin B complex with vitamin C (STRESS TAB) tablet Take 1 tablet by mouth daily 4/19/2023 Yes Cassie García MD

## 2023-04-20 NOTE — PLAN OF CARE
"D: 52 year old male with a PMH significant for multiple myeloma (on chemotherapy) who presents from oncology clinic for chills and rigors along with shortness of breath that started on 4/18. Admitted last night.      I: Monitored and assessed patient status; nursing cares provided.    A:   Today's Highlights/Changes:    Urine and respiratory PCR samples collected in am, results negative.     Waiting for pt to leave sputum and stool sample for cdiff collection.    PRN oxycodone and tylenol given in am for right lower back pain.     Pt states Lenalidomide (Revlimid) chemo med will get delivered some time around 7 pm tonight.     LR discontinued    /71 (BP Location: Right arm)   Pulse 77   Temp 97.7  F (36.5  C) (Oral)   Resp 16   Ht 1.676 m (5' 5.98\")   Wt 66.4 kg (146 lb 6.4 oz)   SpO2 98%   BMI 23.64 kg/m      Neuro: Alert & oriented x 4. Calm, cooperative, and pleasant. Afebrile and VSS.   Respiratory: Room air, denies SOB and chest pain.   GI/: 1 BM yesterday morning per pt, no BM during shift. Voids appropriately, urinal at bedside.   Diet/Appetite: Regular, pt did not eat in am.   Skin: No new deficits noted.  Pain: Right lower back pain, given PRN oxycodone and tylenol.   Labs/Lytes: K 3.7, replaced.   LDA's: Left PIV, infusing TKO.    Activity: Up ad carin, stand by assist if needed. Ambulate in room.     P: Continue to follow POC. Notify Heme team of any new changes.     Palma Bolden RN          "

## 2023-04-21 VITALS
DIASTOLIC BLOOD PRESSURE: 75 MMHG | WEIGHT: 147.7 LBS | HEIGHT: 66 IN | TEMPERATURE: 97.3 F | HEART RATE: 78 BPM | RESPIRATION RATE: 20 BRPM | OXYGEN SATURATION: 97 % | SYSTOLIC BLOOD PRESSURE: 122 MMHG | BODY MASS INDEX: 23.74 KG/M2

## 2023-04-21 LAB
1,3 BETA GLUCAN SER-MCNC: <31 PG/ML
ANION GAP SERPL CALCULATED.3IONS-SCNC: 12 MMOL/L (ref 7–15)
BUN SERPL-MCNC: 10.5 MG/DL (ref 6–20)
CALCIUM SERPL-MCNC: 8.2 MG/DL (ref 8.6–10)
CHLORIDE SERPL-SCNC: 108 MMOL/L (ref 98–107)
CREAT SERPL-MCNC: 1.36 MG/DL (ref 0.67–1.17)
DEPRECATED HCO3 PLAS-SCNC: 18 MMOL/L (ref 22–29)
ERYTHROCYTE [DISTWIDTH] IN BLOOD BY AUTOMATED COUNT: 16.3 % (ref 10–15)
GFR SERPL CREATININE-BSD FRML MDRD: 62 ML/MIN/1.73M2
GLUCOSE SERPL-MCNC: 92 MG/DL (ref 70–99)
HCT VFR BLD AUTO: 30 % (ref 40–53)
HGB BLD-MCNC: 9.4 G/DL (ref 13.3–17.7)
HOLD SPECIMEN: NORMAL
IGG SERPL-MCNC: 380 MG/DL (ref 610–1616)
IGG SERPL-MCNC: 380 MG/DL (ref 610–1616)
IGG1 SER-MCNC: 279 MG/DL (ref 382–929)
IGG2 SER-MCNC: 47 MG/DL (ref 242–700)
IGG3 SER-MCNC: 47 MG/DL (ref 22–176)
IGG4 SER-MCNC: 3 MG/DL (ref 4–86)
MAGNESIUM SERPL-MCNC: 1.7 MG/DL (ref 1.7–2.3)
MCH RBC QN AUTO: 29.4 PG (ref 26.5–33)
MCHC RBC AUTO-ENTMCNC: 31.3 G/DL (ref 31.5–36.5)
MCV RBC AUTO: 94 FL (ref 78–100)
OBSERVATION IMP: NEGATIVE
PLATELET # BLD AUTO: 380 10E3/UL (ref 150–450)
POTASSIUM SERPL-SCNC: 3.3 MMOL/L (ref 3.4–5.3)
POTASSIUM SERPL-SCNC: 3.5 MMOL/L (ref 3.4–5.3)
RBC # BLD AUTO: 3.2 10E6/UL (ref 4.4–5.9)
SODIUM SERPL-SCNC: 138 MMOL/L (ref 136–145)
SUBCLASSES, PERCENT: 99 %
WBC # BLD AUTO: 5.9 10E3/UL (ref 4–11)

## 2023-04-21 PROCEDURE — 36415 COLL VENOUS BLD VENIPUNCTURE: CPT | Performed by: STUDENT IN AN ORGANIZED HEALTH CARE EDUCATION/TRAINING PROGRAM

## 2023-04-21 PROCEDURE — 250N000013 HC RX MED GY IP 250 OP 250 PS 637: Performed by: PHYSICIAN ASSISTANT

## 2023-04-21 PROCEDURE — 83735 ASSAY OF MAGNESIUM: CPT | Performed by: STUDENT IN AN ORGANIZED HEALTH CARE EDUCATION/TRAINING PROGRAM

## 2023-04-21 PROCEDURE — 250N000011 HC RX IP 250 OP 636: Performed by: STUDENT IN AN ORGANIZED HEALTH CARE EDUCATION/TRAINING PROGRAM

## 2023-04-21 PROCEDURE — 84132 ASSAY OF SERUM POTASSIUM: CPT | Performed by: STUDENT IN AN ORGANIZED HEALTH CARE EDUCATION/TRAINING PROGRAM

## 2023-04-21 PROCEDURE — 250N000013 HC RX MED GY IP 250 OP 250 PS 637: Performed by: STUDENT IN AN ORGANIZED HEALTH CARE EDUCATION/TRAINING PROGRAM

## 2023-04-21 PROCEDURE — 250N000013 HC RX MED GY IP 250 OP 250 PS 637: Performed by: INTERNAL MEDICINE

## 2023-04-21 PROCEDURE — 80048 BASIC METABOLIC PNL TOTAL CA: CPT | Performed by: PHYSICIAN ASSISTANT

## 2023-04-21 PROCEDURE — 85027 COMPLETE CBC AUTOMATED: CPT | Performed by: PHYSICIAN ASSISTANT

## 2023-04-21 RX ORDER — LIDOCAINE 4 G/G
2 PATCH TOPICAL EVERY 24 HOURS
Qty: 30 PATCH | Refills: 0 | Status: SHIPPED | OUTPATIENT
Start: 2023-04-21 | End: 2024-01-24

## 2023-04-21 RX ORDER — POTASSIUM CHLORIDE 1.5 G/1.58G
20 POWDER, FOR SOLUTION ORAL ONCE
Status: DISCONTINUED | OUTPATIENT
Start: 2023-04-21 | End: 2023-04-21 | Stop reason: HOSPADM

## 2023-04-21 RX ORDER — LEVOFLOXACIN 250 MG/1
750 TABLET, FILM COATED ORAL DAILY
Status: DISCONTINUED | OUTPATIENT
Start: 2023-04-21 | End: 2023-04-21 | Stop reason: HOSPADM

## 2023-04-21 RX ORDER — METHOCARBAMOL 750 MG/1
750 TABLET, FILM COATED ORAL 3 TIMES DAILY PRN
Status: DISCONTINUED | OUTPATIENT
Start: 2023-04-21 | End: 2023-04-21 | Stop reason: HOSPADM

## 2023-04-21 RX ORDER — POTASSIUM CHLORIDE 750 MG/1
40 TABLET, EXTENDED RELEASE ORAL ONCE
Status: COMPLETED | OUTPATIENT
Start: 2023-04-21 | End: 2023-04-21

## 2023-04-21 RX ORDER — ACYCLOVIR 800 MG/1
800 TABLET ORAL 2 TIMES DAILY
Qty: 180 TABLET | Refills: 1 | Status: SHIPPED | OUTPATIENT
Start: 2023-04-21 | End: 2023-06-05

## 2023-04-21 RX ORDER — LIDOCAINE 4 G/G
2 PATCH TOPICAL
Status: DISCONTINUED | OUTPATIENT
Start: 2023-04-21 | End: 2023-04-21 | Stop reason: HOSPADM

## 2023-04-21 RX ADMIN — LIDOCAINE PATCH 4% 2 PATCH: 40 PATCH TOPICAL at 10:31

## 2023-04-21 RX ADMIN — BACLOFEN 10 MG: 10 TABLET ORAL at 00:02

## 2023-04-21 RX ADMIN — LEVOFLOXACIN 750 MG: 250 TABLET, FILM COATED ORAL at 09:52

## 2023-04-21 RX ADMIN — PIPERACILLIN SODIUM AND TAZOBACTAM SODIUM 4.5 G: 4; .5 INJECTION, POWDER, LYOPHILIZED, FOR SOLUTION INTRAVENOUS at 05:36

## 2023-04-21 RX ADMIN — LOSARTAN POTASSIUM 50 MG: 50 TABLET, FILM COATED ORAL at 08:11

## 2023-04-21 RX ADMIN — LEVOTHYROXINE SODIUM 125 MCG: 0.03 TABLET ORAL at 08:10

## 2023-04-21 RX ADMIN — OXYCODONE HYDROCHLORIDE 5 MG: 5 TABLET ORAL at 08:11

## 2023-04-21 RX ADMIN — POTASSIUM CHLORIDE 40 MEQ: 750 TABLET, EXTENDED RELEASE ORAL at 10:31

## 2023-04-21 RX ADMIN — ROSUVASTATIN CALCIUM 40 MG: 10 TABLET, FILM COATED ORAL at 08:10

## 2023-04-21 RX ADMIN — ACYCLOVIR 800 MG: 800 TABLET ORAL at 08:11

## 2023-04-21 RX ADMIN — ASPIRIN 81 MG: 81 TABLET ORAL at 08:10

## 2023-04-21 RX ADMIN — POTASSIUM CHLORIDE 20 MEQ: 750 TABLET, EXTENDED RELEASE ORAL at 08:10

## 2023-04-21 ASSESSMENT — ACTIVITIES OF DAILY LIVING (ADL)
ADLS_ACUITY_SCORE: 18

## 2023-04-21 NOTE — PLAN OF CARE
[id number]:  Neuro: A&Ox4.   Cardiac: Afebrile, VSS.            Respiratory: RA, lung sounds clear, denies SOB  GI/: BMx1, large/brown/soft. Cdiff neg. Voiding spontaneously.  Diet/appetite: Tolerating regular diet . Denies nausea.  Activity: Up with standby assist    Pain: C/o low back and abdominal pain, controlled with PRN medications.  Skin: No new deficits noted.  Lines: L PIV tko  Plan: Continue plan of care. Notify team with changes/concerns.

## 2023-04-21 NOTE — PLAN OF CARE
"Goal Outcome Evaluation:  Major Shift Events:  VSS on RA. Pt had persistent right sided abdominal/back pain overnight requiring PRN Tylenol, Oxycodone & Baclofen with improvement of symptoms. Independent in the room. Voiding spontaneously using the hand held urinal at the bedside. No BM overnight. Labs pending at the time of shift change and decisions regarding possible replacements deferred  to day shift (day RN aware).     For vital signs and complete assessments, please see documentation flowsheets.      Problem: Plan of Care - These are the overarching goals to be used throughout the patient stay.    Goal: Plan of Care Review  Description: The Plan of Care Review/Shift note should be completed every shift.  The Outcome Evaluation is a brief statement about your assessment that the patient is improving, declining, or no change.  This information will be displayed automatically on your shift note.  Outcome: Progressing  Goal: Patient-Specific Goal (Individualized)  Description: You can add care plan individualizations to a care plan. Examples of Individualization might be:  \"Parent requests to be called daily at 9am for status\", \"I have a hard time hearing out of my right ear\", or \"Do not touch me to wake me up as it startles me\".  Outcome: Progressing  Goal: Absence of Hospital-Acquired Illness or Injury  Outcome: Progressing  Intervention: Identify and Manage Fall Risk  Recent Flowsheet Documentation  Taken 4/21/2023 0510 by Eleanor Liz, RN  Safety Promotion/Fall Prevention:   clutter free environment maintained   nonskid shoes/slippers when out of bed   patient and family education   room near nurse's station   assistive device/personal items within reach  Taken 4/20/2023 2352 by Eleanor Liz, RN  Safety Promotion/Fall Prevention:   clutter free environment maintained   nonskid shoes/slippers when out of bed   patient and family education   room near nurse's station   assistive device/personal items " within reach  Taken 4/20/2023 2128 by Eleanor Liz RN  Safety Promotion/Fall Prevention:   clutter free environment maintained   nonskid shoes/slippers when out of bed   patient and family education   room near nurse's station   assistive device/personal items within reach  Intervention: Prevent Skin Injury  Recent Flowsheet Documentation  Taken 4/21/2023 0531 by Eleanor Liz RN  Body Position: position changed independently  Taken 4/20/2023 2352 by Eleanor Liz RN  Body Position: position changed independently  Taken 4/20/2023 2128 by Eleanor Liz RN  Body Position: position changed independently  Goal: Optimal Comfort and Wellbeing  Outcome: Progressing  Intervention: Monitor Pain and Promote Comfort  Recent Flowsheet Documentation  Taken 4/21/2023 0531 by Eleanor Liz RN  Pain Management Interventions: (wanted to eat breakfast first) medication offered but refused  Taken 4/20/2023 2352 by Eleanor Liz RN  Pain Management Interventions: medication (see MAR)  Taken 4/20/2023 2200 by Eleanor Liz RN  Pain Management Interventions:   quiet environment facilitated   relaxation techniques promoted  Goal: Readiness for Transition of Care  Outcome: Progressing     Problem: Stem Cell/Bone Marrow Transplant  Goal: Optimal Coping with Transplant  Outcome: Progressing  Goal: Symptom-Free Urinary Elimination  Outcome: Progressing  Intervention: Prevent or Manage Bladder Irritation  Recent Flowsheet Documentation  Taken 4/21/2023 0531 by Eleanor Liz RN  Pain Management Interventions: (wanted to eat breakfast first) medication offered but refused  Taken 4/20/2023 2352 by Eleanor Liz RN  Pain Management Interventions: medication (see MAR)  Taken 4/20/2023 2200 by Eleanor Liz RN  Pain Management Interventions:   quiet environment facilitated   relaxation techniques promoted  Goal: Diarrhea Symptom Control  Outcome: Progressing  Goal: Improved Activity Tolerance  Outcome:  Progressing  Intervention: Promote Improved Energy  Recent Flowsheet Documentation  Taken 4/20/2023 2352 by Eleanor Liz RN  Activity Management: activity adjusted per tolerance  Goal: Blood Counts Within Acceptable Range  Outcome: Progressing  Intervention: Monitor and Manage Hematologic Symptoms  Recent Flowsheet Documentation  Taken 4/21/2023 0531 by Eleanor Liz RN  Medication Review/Management: medications reviewed  Taken 4/20/2023 2352 by Eleanor Liz RN  Medication Review/Management: medications reviewed  Taken 4/20/2023 2128 by Eleanor Liz RN  Medication Review/Management: medications reviewed  Goal: Absence of Hypersensitivity Reaction  Outcome: Progressing  Goal: Absence of Infection  Outcome: Progressing  Goal: Improved Oral Mucous Membrane Health and Integrity  Outcome: Progressing  Goal: Nausea and Vomiting Symptom Relief  Outcome: Progressing  Goal: Optimal Nutrition Intake  Outcome: Progressing     Problem: Pain Acute  Goal: Optimal Pain Control and Function  Outcome: Not Progressing  Intervention: Develop Pain Management Plan  Recent Flowsheet Documentation  Taken 4/21/2023 0531 by Eleanor Liz RN  Pain Management Interventions: (wanted to eat breakfast first) medication offered but refused  Taken 4/20/2023 2352 by Eleanor Liz RN  Pain Management Interventions: medication (see MAR)  Taken 4/20/2023 2200 by Eleanor Liz RN  Pain Management Interventions:   quiet environment facilitated   relaxation techniques promoted  Intervention: Prevent or Manage Pain  Recent Flowsheet Documentation  Taken 4/21/2023 0531 by Eleanor Liz RN  Medication Review/Management: medications reviewed  Taken 4/20/2023 2352 by Eleanor Liz RN  Medication Review/Management: medications reviewed  Taken 4/20/2023 2128 by Eleanor Liz RN  Medication Review/Management: medications reviewed

## 2023-04-21 NOTE — PLAN OF CARE
Goal Outcome Evaluation:    Patient discharged to home care of his wife, I went over his AVS along with his clinic appts. And medication list, patient aware to hold two of his medications till he follows up with his provider, acyclovir and revlimid. Patient was to  his medications at the discharge pharmacy on his way out, wife will pick him up at the front entrance. Patient left at around 1130, declined wheelchair transport.

## 2023-04-21 NOTE — DISCHARGE SUMMARY
[facility]    Discharge Summary  Hematology / Oncology    Date of Admission:  4/19/2023  Date of Discharge:  4/21/2023 11:00 AM  Discharging Provider: Lesvia Dubon PA-C  Date of Service (when I saw the patient): 04/21/23    Discharge Diagnoses   # Neutropenic Fevers   # RLL consolidation, pneumonia   # Myeloma with high risk features      History of Present Illness   June Ronquillo is a 52 year old male admitted on 4/19/2023. He has a history of multiple myeloma with high risk cytogenetics s/p autoBMT now on maintenance therapy with daratumumab, zometa, and revlimid presenting with fever, rigors, chest pain, and shortness of breath. Found to have RLL pneumonia c/b some right sided back pain thought to be 2/2 diaphragmatic irritation. Discharged on 7d course of Levaquin.     We reviewed signs & symptoms of concern that should prompt a call to clinic triage or a visit to the ED, and patient voiced understanding. All questions answered. On the day of discharge, patient was quite well-appearing, hemodynamically stable, and felt safe and comfortable with the plans for discharge and follow-up.     Follow up:    DOROTA and labs within 1 week     Outpatient Provider to follow:     Repeat BMP to assess renal function     Medication Highlights:     HOLD ACV and Revlimid until outpatient follow up     Levaquin 750 mg daily (to complete 7d course)     Back pain regimen - PRN APAP, Baclofen and lido patches prescribed       Hospital Course   June Ronquillo was admitted on 4/19/2023.  The following problems were addressed during his hospitalization:    # Fever and rigors  # Sepsis  # RLL consolidation, pneumonia  Patient was noted to have a right upper lobe groundglass opacity in December and had been planning to see pulmonology in April 2023.  However, he also developed severe cough in January and February that is somewhat resolved. In the past 2 days, noted to have  significant rigors, chest pain, shortness of breath, and fatigue. Imaging reveals more consolidation in the right lower lobe along with mediastinal lymphadenopathy. While an underlying, progressive concern such as invasive fungal disease or mycobacterial disease is possible, the more acute syndrome the last few days seems more consistent with a typical bacterial pneumonia. Procal 0.21. CT chest with RLL consolidation and likely reactive lymphadenopathy.   - BCx (4/18) NGTD.  - UCx (4/18) NGTD.   - RVP, influenza, RSV, covid negative.   - Strep pneumo, legionella negative.  - galactomannan, fungitell  - piperacillin/tazobactam 4.5 g every 6 hours (4/18 - 4/20); transitioned to levaquin 4/21 AM.   - Discharged on Levaquin to completed 7d course    - Oxycodone PRN for chest pain, baclofen PRN for muscle spasms.     # ID prophylaxis   - Continue PTA acyclovir -- HELD at discharge until close follow up given bump in Cr   - PTA bactrim stopped in 12/2022 for unclear reasons - will seek clarification with outpatient team as per OP notes patient should still be on this medication.      # Myeloma with high risk features   Follows with Dr. Vazquez. See OP notes for full details. 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 Given his high risk cytogenetics he started miracle + revlimid maintenance therapy 2/21/22. He had severe flu-like effects from his first dose of Zometa but he has been tolerating monthly Zometa dosed at 3 mg. His last IV daratumumab and zometa given on 04/11/23. Continues on PO revlimid daily. Most recent IgG (4/11) of 468.  - HOLD Revlimid 10 mg daily until follow up in clinic    # AZUCENA   Creatinine increased to 1.36 this admission (1.08 2 weeks prior). Likely 2/2 medications, acute illness and PO intake.   - Increase PO fluid intake.   - Avoid nephrotoxins. ACV held until repeat BMP outpatient.      # Anemia  Secondary to miracle, revlimid.  - Continue to monitor.  -  Transfuse for hgb <7     Patient seen and discussed with Dr Gabriel.     I spent 45 minutes in the care of this patient today, which included time necessary for review of interval events, obtaining history and physical exam, ordering medications/tests/procedures as medically indicated, review of pertinent medical literature, counseling of the patient, coordination of care, and documentation time. Over 50% of time was spent counseling the patient and/or coordinating care.    Lesvia Dubon PA-C    Hematology/Oncology   Pager: [phone]      Significant Results and Procedures   Results for orders placed or performed during the hospital encounter of 04/19/23   Abdomen US, limited (RUQ only)    Narrative    EXAMINATION: US ABDOMEN LIMITED 4/19/2023 7:23 PM     COMPARISON: CT chest/abdomen/pelvis from 1605 hours    HISTORY: 52 years Male RUQ tenderness, subjective fevers, hx multiple  myeloma    TECHNIQUE: The abdomen was scanned in standard fashion with  specialized ultrasound transducer(s) using both grey scale and limited  color Doppler techniques.    FINDINGS:   Fluid: No evidence of ascites or pleural effusions.    Liver: The liver demonstrates normal echotexture, measuring 15.0 cm in  craniocaudal dimension. There is no focal mass. No intrahepatic  biliary dilatation. Main portal vein is patent with hepatopedal flow.    Gallbladder: There is no wall thickening, pericholecystic fluid,  positive sonographic Chambers's sign or evidence for cholelithiasis.    Bile Ducts: Both the intra- and extrahepatic biliary system are of  normal caliber.  The common bile duct measures 5 mm in diameter.    Pancreas: Partially obscured. Visualized portions of the head and body  of the pancreas are unremarkable.     Kidney: The right kidney measures 10.3 cm long. There is no  hydronephrosis or hydroureter, no shadowing renal calculi, cystic  lesion or mass.     Aorta and IVC: The visualized portions of the aorta and IVC  are  unremarkable.       Impression    IMPRESSION:   1.  Normal right upper quadrant ultrasound. No sonographic evidence of  cholecystitis.    I have personally reviewed the examination and initial interpretation  and I agree with the findings.    RICHIE ARBOLEDA MD         SYSTEM ID:  [id number]     *Note: Due to a large number of results and/or encounters for the requested time period, some results have not been displayed. A complete set of results can be found in Results Review.       Pending Results   These results will be followed up by [facility]  Unresulted Labs Ordered in the Past 30 Days of this Admission     Date and Time Order Name Status Description    4/21/2023  9:30 AM Basic metabolic panel In process     4/21/2023  9:30 AM CBC with platelets In process     4/19/2023 10:02 PM Aspergillus Galactomannan Antigen In process     4/19/2023  9:56 PM 1,3 Beta D glucan fungitell In process     4/19/2023  6:12 PM Blood Culture Peripheral Blood Preliminary     4/19/2023  6:12 PM Blood Culture Peripheral Blood Preliminary     4/19/2023  2:06 PM BLOOD CULTURE Preliminary     4/19/2023  2:06 PM BLOOD CULTURE Preliminary           Code Status   Full Code    Primary Care Physician   Selma Johnson    Physical Exam   Temp: 97.3  F (36.3  C) Temp src: Axillary BP: 122/75 Pulse: 78   Resp: 20 SpO2: 97 % O2 Device: None (Room air)    Vitals:    04/19/23 2139 04/20/23 0832 04/21/23 0831   Weight: 67.4 kg (148 lb 9.4 oz) 66.4 kg (146 lb 6.4 oz) 67 kg (147 lb 11.2 oz)     Vital Signs with Ranges  Temp:  [96.7  F (35.9  C)-97.8  F (36.6  C)] 97.3  F (36.3  C)  Pulse:  [71-80] 78  Resp:  [16-20] 20  BP: (104-122)/(69-79) 122/75  SpO2:  [96 %-100 %] 97 %  I/O last 3 completed shifts:  In: 1280 [P.O.:840; I.V.:440]  Out: 2374 [Urine:2374]    Constitutional: Pleasant male seen laying in bed. No apparent distress, and appears stated age.  Eyes: Lids and lashes normal, sclera clear, conjunctiva normal.  ENT: Normocephalic, without  obvious abnormality, atraumatic, oral pharynx with moist mucus membranes, tonsils without erythema or exudates, gums normal and good dentition.   Respiratory: No increased work of breathing, good air exchange, clear to auscultation bilaterally, no crackles or wheezing.  Cardiovascular: Normal apical impulse, regular rate and rhythm, normal S1 and S2, and no murmur noted.  MSK: Mid R lower back mildly tender on exam   GI: No masses or scars. +BS. Soft. No tenderness on palpation.  Skin: No bruising or bleeding, normal skin color  Extremities: There is no redness, warmth, or swelling of the joints. No lower extremity edema. No cyanosis.  Neurologic: Awake, alert, oriented to name, place and time.    Vascular access: PIV c/d/i     Time Spent on this Encounter   ILesvia PA-C, personally saw the patient today and spent greater than 30 minutes discharging this patient.    Discharge Disposition   Discharged to home  Condition at discharge: Stable    Consultations This Hospital Stay   None    Discharge Orders      Check Out Appointment Request    Follow up with DOROTA and labs in 1 week     Discharge Medications   Current Discharge Medication List      START taking these medications    Details   baclofen (LIORESAL) 10 MG tablet Take 1 tablet (10 mg) by mouth 3 times daily as needed for muscle spasms  Qty: 30 tablet, Refills: 0    Associated Diagnoses: Muscle spasm      levofloxacin (LEVAQUIN) 750 MG tablet Take 1 tablet (750 mg) by mouth daily for 6 days Starting on 4/22  Qty: 6 tablet, Refills: 0    Associated Diagnoses: Pneumonia of right lower lobe due to infectious organism         CONTINUE these medications which have CHANGED    Details   LENalidomide (REVLIMID) 10 MG CAPS capsule Take 1 capsule (10 mg) by mouth daily HOLD until follow up in oncology clinic  Qty: 28 capsule, Refills: 0    Associated Diagnoses: Multiple myeloma not having achieved remission (H)         CONTINUE these medications which have  NOT CHANGED    Details   acetaminophen (TYLENOL) 500 MG tablet Take 500-1,000 mg by mouth every 6 hours as needed for mild pain      acyclovir (ZOVIRAX) 800 MG tablet TAKE ONE TABLET BY MOUTH TWICE A DAY  Qty: 180 tablet, Refills: 1    Associated Diagnoses: Multiple myeloma not having achieved remission (H)      aspirin (ASA) 81 MG EC tablet Take 1 tablet (81 mg) by mouth daily      calcium carb-cholecalciferol (OS-HARDEEP) 500-200 MG-UNIT tablet Take 1 tablet by mouth daily  Qty: 90 tablet, Refills: 3    Associated Diagnoses: Multiple myeloma not having achieved remission (H)      calcium carbonate (TUMS) 500 MG chewable tablet Take 2 tablets (1,000 mg) by mouth 3 times daily  Qty: 90 tablet, Refills: 1    Associated Diagnoses: Malignant neoplasm of thyroid gland (H)      levothyroxine (SYNTHROID/LEVOTHROID) 125 MCG tablet Take 1 tablet (125 mcg) by mouth daily  Qty: 90 tablet, Refills: 2    Associated Diagnoses: Hypothyroidism due to Hashimoto's thyroiditis      losartan (COZAAR) 50 MG tablet Take 1 tablet (50 mg) by mouth daily  Qty: 90 tablet, Refills: 3    Associated Diagnoses: Peripheral polyneuropathy; Status post bone marrow transplant (H)      potassium chloride ER (KLOR-CON M) 10 MEQ CR tablet Take 2 tablets (20 mEq) by mouth daily  Qty: 60 tablet, Refills: 3    Associated Diagnoses: Multiple myeloma not having achieved remission (H)      rosuvastatin (CRESTOR) 40 MG tablet Take 1 tablet (40 mg) by mouth daily  Qty: 90 tablet, Refills: 3    Associated Diagnoses: CVD (cardiovascular disease)      vitamin B complex with vitamin C (STRESS TAB) tablet Take 1 tablet by mouth daily  Qty: 90 tablet, Refills: 3    Associated Diagnoses: Multiple myeloma not having achieved remission (H)           Allergies   Allergies   Allergen Reactions     Blood Transfusion Related (Informational Only) Other (See Comments)     Patient has a history of a clinically significant antibody against RBC antigens.  A delay in compatible  RBCs may occur.      Data   Most Recent 3 CBC's:Recent Labs   Lab Test 04/21/23  0617 04/20/23  0454 04/19/23  1827   WBC 5.9 6.3 7.6   HGB 9.4* 10.8* 10.3*   MCV 94 94 93    385 377      Most Recent 3 BMP's:  Recent Labs   Lab Test 04/21/23  0617 04/20/23  0454 04/19/23  2227 04/19/23  1827    140  --  136   POTASSIUM 3.5  3.3* 3.7  --  3.4   CHLORIDE 108* 107  --  105   CO2 18* 21*  --  19*   BUN 10.5 10.2  --  10.5   CR 1.36* 1.29* 1.23* 1.24*   ANIONGAP 12 12  --  12   HARDEEP 8.2* 8.8  --  8.4*   GLC 92 107*  --  119*     Most Recent 2 LFT's:  Recent Labs   Lab Test 04/19/23  1827 04/19/23  1303   AST 23 22   ALT 16 20   ALKPHOS 68 75   BILITOTAL 0.5 0.4     Most Recent INR's and Anticoagulation Dosing History:  Anticoagulation Dose History         Latest Ref Rng & Units 11/14/2021 11/21/2021 11/22/2021 11/23/2021   Recent Dosing and Labs   INR 0.85 - 1.15 1.06   1.23   1.13   1.16         3/7/2022 5/9/2022 12/5/2022   Recent Dosing and Labs   INR 0.96   0.89   0.98                Most Recent 3 Troponin's:  Recent Labs   Lab Test 10/13/21  1354   TROPONIN <0.015     Most Recent Cholesterol Panel:  Recent Labs   Lab Test 10/13/21  0842   CHOL 163   LDL 93   HDL 43   TRIG 134     Most Recent 6 Bacteria Isolates From Any Culture (See EPIC Reports for Culture Details):No lab results found.  Most Recent TSH, T4 and A1c Labs:  Recent Labs   Lab Test 01/16/23  1524 12/19/22  1103 06/17/22  1332   TSH  --  0.93 0.07*   T4  --   --  1.36   A1C 6.2*  --  5.5

## 2023-04-22 LAB
GALACTOMANNAN AG SERPL QL IA: NEGATIVE
GALACTOMANNAN AG SPEC IA-ACNC: 0.1

## 2023-04-24 LAB
BACTERIA BLD CULT: NO GROWTH

## 2023-04-24 ASSESSMENT — ENCOUNTER SYMPTOMS
WEIGHT LOSS: 0
HEMOPTYSIS: 0
POLYPHAGIA: 0
HALLUCINATIONS: 0
INCREASED ENERGY: 1
SHORTNESS OF BREATH: 1
JOINT SWELLING: 1
ALTERED TEMPERATURE REGULATION: 0
SPUTUM PRODUCTION: 0
POSTURAL DYSPNEA: 0
DYSPNEA ON EXERTION: 1
MUSCLE WEAKNESS: 1
DECREASED APPETITE: 0
WHEEZING: 0
COUGH: 0
SNORES LOUDLY: 1
FATIGUE: 1
CHILLS: 0
COUGH DISTURBING SLEEP: 0
NIGHT SWEATS: 0
WEIGHT GAIN: 0
MYALGIAS: 1
FEVER: 0
MUSCLE CRAMPS: 1
STIFFNESS: 1
POLYDIPSIA: 0
ARTHRALGIAS: 1
BACK PAIN: 1
NECK PAIN: 1

## 2023-04-25 ENCOUNTER — PATIENT OUTREACH (OUTPATIENT)
Dept: CARE COORDINATION | Facility: CLINIC | Age: 53
End: 2023-04-25
Payer: COMMERCIAL

## 2023-04-25 NOTE — PROGRESS NOTES
[facility] Contact  [facility]/Voicemail     Clinical Data: Transitional Care Management Outreach     Outreach attempted x 2.  Unable to leave vm   Plan:  [facility] will do no further outreaches at this time.       Aye Horowitz  Community Health Worker  [facility] [place]  [phone]      *Connected Care Resource Team does NOT follow patient ongoing. Referrals are identified based on internal discharge reports and the outreach is to ensure patient has an understanding of their discharge instructions.

## 2023-04-27 ENCOUNTER — PRE VISIT (OUTPATIENT)
Dept: PULMONOLOGY | Facility: CLINIC | Age: 53
End: 2023-04-27

## 2023-04-27 ENCOUNTER — OFFICE VISIT (OUTPATIENT)
Dept: PULMONOLOGY | Facility: CLINIC | Age: 53
End: 2023-04-27
Attending: STUDENT IN AN ORGANIZED HEALTH CARE EDUCATION/TRAINING PROGRAM
Payer: COMMERCIAL

## 2023-04-27 ENCOUNTER — ANCILLARY PROCEDURE (OUTPATIENT)
Dept: CT IMAGING | Facility: CLINIC | Age: 53
End: 2023-04-27
Attending: INTERNAL MEDICINE
Payer: COMMERCIAL

## 2023-04-27 VITALS — HEART RATE: 95 BPM | SYSTOLIC BLOOD PRESSURE: 143 MMHG | OXYGEN SATURATION: 98 % | DIASTOLIC BLOOD PRESSURE: 90 MMHG

## 2023-04-27 DIAGNOSIS — R94.2 ABNORMAL PFT: ICD-10-CM

## 2023-04-27 DIAGNOSIS — C90.00 MULTIPLE MYELOMA NOT HAVING ACHIEVED REMISSION (H): ICD-10-CM

## 2023-04-27 PROCEDURE — 94375 RESPIRATORY FLOW VOLUME LOOP: CPT | Performed by: INTERNAL MEDICINE

## 2023-04-27 PROCEDURE — 94200 LUNG FUNCTION TEST (MBC/MVV): CPT | Performed by: INTERNAL MEDICINE

## 2023-04-27 PROCEDURE — 94799 UNLISTED PULMONARY SVC/PX: CPT | Performed by: INTERNAL MEDICINE

## 2023-04-27 PROCEDURE — 94729 DIFFUSING CAPACITY: CPT | Performed by: INTERNAL MEDICINE

## 2023-04-27 PROCEDURE — 71250 CT THORAX DX C-: CPT | Mod: GC | Performed by: RADIOLOGY

## 2023-04-27 PROCEDURE — 99204 OFFICE O/P NEW MOD 45 MIN: CPT | Mod: 25 | Performed by: INTERNAL MEDICINE

## 2023-04-27 PROCEDURE — G0463 HOSPITAL OUTPT CLINIC VISIT: HCPCS | Performed by: INTERNAL MEDICINE

## 2023-04-27 PROCEDURE — 94726 PLETHYSMOGRAPHY LUNG VOLUMES: CPT | Performed by: INTERNAL MEDICINE

## 2023-04-27 ASSESSMENT — PAIN SCALES - GENERAL: PAINLEVEL: NO PAIN (0)

## 2023-04-27 NOTE — LETTER
4/27/2023         RE: June Ronquillo  [address]  [facility] [address]        Dear Colleague,    Thank you for referring your patient, June Ronquillo, to the [facility] [place]. Please see a copy of my visit note below.    Reason for Visit  June Ronquillo is a 52 year old year old male who is being seen for New Patient (New BMT)    Pulmonary HPI  52 referred to the Pulmonary clinic for evaluation of abnormal PFT's and abnormal chest CT.  Underwent autologous HSCT in November of 2021, denies any complications after transplant.  Was hospitalized on 4-19-23 for 2 days with RLL pneumonia; treated with Levofloxacin for 7 days.  States developed a cough in December, had a viral URI prior to that. Cough was dry in character, states mainly occurred at night when laying down flat.  Denied SOB at that time. Cough completely resolved by February 2023, no return of cough.  States prior to his recent admission developed right sided pleuritic chest pain and significant MATAMOROS.  Xiomy previous history of asthma, frequent bronchitis, cough, or pneumonia,  Non smoker.  CT chest from today (4-27-23) was reviewed in clinic- RLL and LLL infiltrates have resolved, small areas of GGO in that similar distribution; in addition new areas of GGO in anterior RLL and LLL anterior to diaphragm, deep in lower lobe.    The patient was seen and examined by Ibrahima Quevedo MD           Current Outpatient Medications   Medication    acetaminophen (TYLENOL) 500 MG tablet    acyclovir (ZOVIRAX) 800 MG tablet    aspirin (ASA) 81 MG EC tablet    baclofen (LIORESAL) 10 MG tablet    calcium carb-cholecalciferol (OS-HARDEEP) 500-200 MG-UNIT tablet    calcium carbonate (TUMS) 500 MG chewable tablet    LENalidomide (REVLIMID) 10 MG CAPS capsule    levofloxacin (LEVAQUIN) 750 MG tablet    levothyroxine (SYNTHROID/LEVOTHROID) 125 MCG tablet    Lidocaine (LIDOCARE) 4 % Patch     losartan (COZAAR) 50 MG tablet    potassium chloride ER (KLOR-CON M) 10 MEQ CR tablet    rosuvastatin (CRESTOR) 40 MG tablet    vitamin B complex with vitamin C (STRESS TAB) tablet     No current facility-administered medications for this visit.     Allergies   Allergen Reactions    Blood Transfusion Related (Informational Only) Other (See Comments)     Patient has a history of a clinically significant antibody against RBC antigens.  A delay in compatible RBCs may occur.      Past Medical History:   Diagnosis Date    CAD (coronary artery disease)     s/p stent to CFX    Heart attack (H)     Hyperlipidemia LDL goal <100     Hypertension     Stented coronary artery     Thyroid disease        Past Surgical History:   Procedure Laterality Date    BONE MARROW BIOPSY, BONE SPECIMEN, NEEDLE/TROCAR Left 5/17/2021    Procedure: BIOPSY, BONE MARROW with aspiration and ancillary studies;  Surgeon: Niharika Regalado MD;  Location: [facility]    BONE MARROW BIOPSY, BONE SPECIMEN, NEEDLE/TROCAR Left 10/14/2021    Procedure: BIOPSY, BONE MARROW;  Surgeon: Alexa Albert APRN CNP;  Location: [facility] OR    BONE MARROW BIOPSY, BONE SPECIMEN, NEEDLE/TROCAR Right 3/7/2022    Procedure: BIOPSY, BONE MARROW;  Surgeon: Deborah Carmona PA-C;  Location: [facility] OR    BONE MARROW BIOPSY, BONE SPECIMEN, NEEDLE/TROCAR N/A 5/9/2022    Procedure: BIOPSY, BONE MARROW;  Surgeon: Deborah Leblanc PA-C;  Location: [facility] OR    BONE MARROW BIOPSY, BONE SPECIMEN, NEEDLE/TROCAR Left 12/5/2022    Procedure: BIOPSY, BONE MARROW;  Surgeon: Alba Moses APRN CNP;  Location: [facility] OR    HEART CATH PTCA SINGLE VESSEL  10/10/2004    Stent to X - [facility]     INSERT CATHETER VASCULAR ACCESS Right 11/3/2021    Procedure: NON VALVED TUNNELED DUAL LUMEN APHARESIS CAPABLE LINE INSERTION, VASCULAR ACCESS CATHETER;  Surgeon: Werner Mcnamara MD;  Location: [facility] OR    IR CVC TUNNEL PLACEMENT > 5 YRS OF AGE  11/3/2021    IR CVC TUNNEL REMOVAL RIGHT   12/2/2021    THYROIDECTOMY N/A 5/3/2022    Procedure: total thyroidectomy, Left selective neck dissection level 6 and level 7 ;  Surgeon: Clarita Sepulveda MD;  Location: [facility] [place]       Social History     Socioeconomic History    Marital status:      Spouse name: Not on file    Number of children: Not on file    Years of education: Not on file    Highest education level: Not on file   Occupational History    Not on file   Tobacco Use    Smoking status: Never    Smokeless tobacco: Never   Vaping Use    Vaping status: Not on file   Substance and Sexual Activity    Alcohol use: Not Currently    Drug use: No    Sexual activity: Yes     Partners: Female   Other Topics Concern    Parent/sibling w/ CABG, MI or angioplasty before 65F 55M? No     Service Not Asked    Blood Transfusions Not Asked    Caffeine Concern No    Occupational Exposure Not Asked    Hobby Hazards Not Asked    Sleep Concern Not Asked    Stress Concern Not Asked    Weight Concern Not Asked    Special Diet No    Back Care Not Asked    Exercise No    Bike Helmet Not Asked    Seat Belt Yes    Self-Exams Not Asked   Social History Narrative    Not on file     Social Determinants of Health     Financial Resource Strain: Not on file   Food Insecurity: Not on file   Transportation Needs: Not on file   Physical Activity: Not on file   Stress: Not on file   Social Connections: Not on file   Intimate Partner Violence: Not At Risk (12/8/2022)    Humiliation, Afraid, Rape, and Kick questionnaire     Fear of Current or Ex-Partner: No     Emotionally Abused: No     Physically Abused: No     Sexually Abused: No   Housing Stability: Not on file       FH:  Reviewed with patient.  No family history of lung disease  ROS Pulmonary  A complete ROS was otherwise negative except as noted in the HPI.  BP (!) 143/90 (BP Location: Right arm)   Pulse 95   SpO2 98%   Exam:   GENERAL APPEARANCE: Well developed, well nourished, alert, and in no apparent  distress.  EYES: PERRL, EOMI  HENT: Nasal mucosa with no edema and no hyperemia. No nasal polyps.  EARS: Canals clear, TMs normal  MOUTH: Oral mucosa is moist, without any lesions, no tonsillar enlargement, no oropharyngeal exudate.  NECK: supple, no masses, no thyromegaly.  LYMPHATICS: No significant axillary, cervical, or supraclavicular nodes.  RESP:  Good air flow throughout.  No crackles. No rhonchi. No wheezes.  CV: Normal S1, S2, regular rhythm, normal rate. No murmur.  No rub. No gallop. No LE edema.   ABDOMEN:  Bowel sounds normal, soft, nontender, no HSM or masses.   MS: extremities normal. No clubbing. No cyanosis.  SKIN: no rash on limited exam  NEURO: Mentation intact, speech normal, normal strength and tone, normal gait and stance  PSYCH: mentation appears normal. and affect normal/bright  Results:  Pulmonary function tests from 12-19-22 were personally reviewed  FVC 2.73 (73%), FVC 2.27 (75%), FEV-1/FVC 83%  RC 80%, RV 71%, TLC 71%  cDLCO 78%  No airflow limitation.  Mild restrictive disease.  Normal diffusion capacity. Compared to 10-13-21 the lung volumes are improved  Full PFT's from 4-27-23  FVC 2.61 (69%), FEV-1 2.25 (74%), FEV-1/FVC 86%  FRC 77%, RV 60%, and TLC 66%  Compared to 12-22 there is a < 10% decrease in lung volumes     Recent Results (from the past 168 hour(s))   C. difficile Toxin B PCR with reflex to C. difficile Antigen and Toxins A/B EIA    Collection Time: 04/20/23  4:41 PM    Specimen: Per Rectum; Stool   Result Value Ref Range    C Difficile Toxin B by PCR Negative Negative   Potassium    Collection Time: 04/21/23  6:17 AM   Result Value Ref Range    Potassium 3.3 (L) 3.4 - 5.3 mmol/L   Magnesium    Collection Time: 04/21/23  6:17 AM   Result Value Ref Range    Magnesium 1.7 1.7 - 2.3 mg/dL   Extra Purple Top Tube    Collection Time: 04/21/23  6:17 AM   Result Value Ref Range    Hold Specimen JIC    CBC with platelets    Collection Time: 04/21/23  6:17 AM   Result Value Ref  Range    WBC Count 5.9 4.0 - 11.0 10e3/uL    RBC Count 3.20 (L) 4.40 - 5.90 10e6/uL    Hemoglobin 9.4 (L) 13.3 - 17.7 g/dL    Hematocrit 30.0 (L) 40.0 - 53.0 %    MCV 94 78 - 100 fL    MCH 29.4 26.5 - 33.0 pg    MCHC 31.3 (L) 31.5 - 36.5 g/dL    RDW 16.3 (H) 10.0 - 15.0 %    Platelet Count 380 150 - 450 10e3/uL   Basic metabolic panel    Collection Time: 04/21/23  6:17 AM   Result Value Ref Range    Sodium 138 136 - 145 mmol/L    Potassium 3.5 3.4 - 5.3 mmol/L    Chloride 108 (H) 98 - 107 mmol/L    Carbon Dioxide (CO2) 18 (L) 22 - 29 mmol/L    Anion Gap 12 7 - 15 mmol/L    Urea Nitrogen 10.5 6.0 - 20.0 mg/dL    Creatinine 1.36 (H) 0.67 - 1.17 mg/dL    Calcium 8.2 (L) 8.6 - 10.0 mg/dL    Glucose 92 70 - 99 mg/dL    GFR Estimate 62 >60 mL/min/1.73m2   Pulmonary Function Test    Collection Time: 04/27/23  2:24 PM   Result Value Ref Range    FVC-Pred 3.74 L    FVC-Pre 2.61 L    FVC-%Pred-Pre 69 %    FEV1-Pre 2.25 L    FEV1-%Pred-Pre 74 %    FEV1FVC-Pred 80 %    FEV1FVC-Pre 86 %    FEFMax-Pred 8.53 L/sec    FEFMax-Pre 9.14 L/sec    FEFMax-%Pred-Pre 107 %    FEF2575-Pred 2.80 L/sec    FEF2575-Pre 2.84 L/sec    GBL9283-%Pred-Pre 101 %    ExpTime-Pre 6.10 sec    FIFMax-Pre 4.46 L/sec    MEP-Pre 94 cmH2O    MIP-Pre -65 cmH2O    MVV-Pred 131 L/min    MVV-Pre 129 L/min    MVV-%Pred-Pre 98 %    VC-Pred 4.25 L    VC-Pre 2.77 L    VC-%Pred-Pre 65 %    IC-Pred 3.15 L    IC-Pre 1.53 L    IC-%Pred-Pre 48 %    ERV-Pred 1.10 L    ERV-Pre 1.24 L    ERV-%Pred-Pre 112 %    FEV1FEV6-Pred 80 %    FEV1FEV6-Pre 86 %    FRCPleth-Pred 3.26 L    FRCPleth-Pre 2.53 L    FRCPleth-%Pred-Pre 77 %    RVPleth-Pred 2.12 L    RVPleth-Pre 1.29 L    RVPleth-%Pred-Pre 60 %    TLCPleth-Pred 6.11 L    TLCPleth-Pre 4.06 L    TLCPleth-%Pred-Pre 66 %    DLCOunc-Pred 24.45 ml/min/mmHg    DLCOunc-Pre 14.33 ml/min/mmHg    DLCOunc-%Pred-Pre 58 %    DLCOcor-Pre 17.60 ml/min/mmHg    DLCOcor-%Pred-Pre 71 %    VA-Pre 4.04 L    VA-%Pred-Pre 73 %    FEV1SVC-Pred 71 %     FEV1SVC-Pre 81 %       Assessment and plan:   53 YO s/p autologous HSCT with restrictive lung volumes that are not significantly changed since pre-transplant.  CT chest with GGO in bases that are consistent with atelectasis, no findings that are suggestive of an infection.  Etiology of restrictive PFT's are likely due to statistical spread with component of stature and ethnicity also contributing.  No concerns for an interstitial lung process or neurologic weakness as cause.  No further work-up is necessary.      RTC prn. Advised patient to contact the clinic with any questions or concerns.        Answers for HPI/ROS submitted by the patient on 4/24/2023  General Symptoms: Yes  Skin Symptoms: No  HENT Symptoms: No  EYE SYMPTOMS: No  HEART SYMPTOMS: No  LUNG SYMPTOMS: Yes  INTESTINAL SYMPTOMS: No  URINARY SYMPTOMS: No  REPRODUCTIVE SYMPTOMS: No  SKELETAL SYMPTOMS: Yes  BLOOD SYMPTOMS: No  NERVOUS SYSTEM SYMPTOMS: No  MENTAL HEALTH SYMPTOMS: No  Fever: No  Loss of appetite: No  Weight loss: No  Weight gain: No  Fatigue: Yes  Night sweats: No  Chills: No  Increased stress: No  Excessive hunger: No  Excessive thirst: No  Feeling hot or cold when others believe the temperature is normal: No  Loss of height: No  Post-operative complications: No  Surgical site pain: No  Hallucinations: No  Change in or Loss of Energy: Yes  Hyperactivity: No  Confusion: No  Cough: No  Sputum or phlegm: No  Coughing up blood: No  Difficulty breating or shortness of breath: Yes  Snoring: Yes  Wheezing: No  Difficulty breathing on exertion: Yes  Nighttime Cough: No  Difficulty breathing when lying flat: No  Back pain: Yes  Muscle aches: Yes  Neck pain: Yes  Swollen joints: Yes  Joint pain: Yes  Bone pain: Yes  Muscle cramps: Yes  Muscle weakness: Yes  Joint stiffness: Yes  Bone fracture: No        Ibrahima Quevedo MD

## 2023-04-27 NOTE — NURSING NOTE
Chief Complaint   Patient presents with     New Patient     New BMT     Vitals were taken and medications were reconciled.     Maggi Dumas RMA  3:58 PM

## 2023-04-27 NOTE — PROGRESS NOTES
Reason for Visit  June Ronquillo is a 52 year old year old male who is being seen for New Patient (New BMT)    Pulmonary HPI  52 referred to the Pulmonary clinic for evaluation of abnormal PFT's and abnormal chest CT.  Underwent autologous HSCT in November of 2021, denies any complications after transplant.  Was hospitalized on 4-19-23 for 2 days with RLL pneumonia; treated with Levofloxacin for 7 days.  States developed a cough in December, had a viral URI prior to that. Cough was dry in character, states mainly occurred at night when laying down flat.  Denied SOB at that time. Cough completely resolved by February 2023, no return of cough.  States prior to his recent admission developed right sided pleuritic chest pain and significant MATAMOROS.  Xiomy previous history of asthma, frequent bronchitis, cough, or pneumonia,  Non smoker.  CT chest from today (4-27-23) was reviewed in clinic- RLL and LLL infiltrates have resolved, small areas of GGO in that similar distribution; in addition new areas of GGO in anterior RLL and LLL anterior to diaphragm, deep in lower lobe.    The patient was seen and examined by Ibrahima Quevedo MD           Current Outpatient Medications   Medication     acetaminophen (TYLENOL) 500 MG tablet     acyclovir (ZOVIRAX) 800 MG tablet     aspirin (ASA) 81 MG EC tablet     baclofen (LIORESAL) 10 MG tablet     calcium carb-cholecalciferol (OS-HARDEEP) 500-200 MG-UNIT tablet     calcium carbonate (TUMS) 500 MG chewable tablet     LENalidomide (REVLIMID) 10 MG CAPS capsule     levofloxacin (LEVAQUIN) 750 MG tablet     levothyroxine (SYNTHROID/LEVOTHROID) 125 MCG tablet     Lidocaine (LIDOCARE) 4 % Patch     losartan (COZAAR) 50 MG tablet     potassium chloride ER (KLOR-CON M) 10 MEQ CR tablet     rosuvastatin (CRESTOR) 40 MG tablet     vitamin B complex with vitamin C (STRESS TAB) tablet     No current facility-administered medications for this visit.     Allergies   Allergen Reactions     Blood  Transfusion Related (Informational Only) Other (See Comments)     Patient has a history of a clinically significant antibody against RBC antigens.  A delay in compatible RBCs may occur.      Past Medical History:   Diagnosis Date     CAD (coronary artery disease)     s/p stent to CFX     Heart attack (H)      Hyperlipidemia LDL goal <100      Hypertension      Stented coronary artery      Thyroid disease        Past Surgical History:   Procedure Laterality Date     BONE MARROW BIOPSY, BONE SPECIMEN, NEEDLE/TROCAR Left 5/17/2021    Procedure: BIOPSY, BONE MARROW with aspiration and ancillary studies;  Surgeon: Niharika Regalado MD;  Location: [facility] OR     BONE MARROW BIOPSY, BONE SPECIMEN, NEEDLE/TROCAR Left 10/14/2021    Procedure: BIOPSY, BONE MARROW;  Surgeon: Alexa Albert APRN CNP;  Location: [facility] OR     BONE MARROW BIOPSY, BONE SPECIMEN, NEEDLE/TROCAR Right 3/7/2022    Procedure: BIOPSY, BONE MARROW;  Surgeon: Deborah Carmona PA-C;  Location: [facility] OR     BONE MARROW BIOPSY, BONE SPECIMEN, NEEDLE/TROCAR N/A 5/9/2022    Procedure: BIOPSY, BONE MARROW;  Surgeon: Deborah Leblanc PA-C;  Location: [facility] OR     BONE MARROW BIOPSY, BONE SPECIMEN, NEEDLE/TROCAR Left 12/5/2022    Procedure: BIOPSY, BONE MARROW;  Surgeon: Alba Moses APRN CNP;  Location: [facility] OR     HEART CATH PTCA SINGLE VESSEL  10/10/2004    Stent to CFX - [facility]      INSERT CATHETER VASCULAR ACCESS Right 11/3/2021    Procedure: NON VALVED TUNNELED DUAL LUMEN APHARESIS CAPABLE LINE INSERTION, VASCULAR ACCESS CATHETER;  Surgeon: Werner Mcnamara MD;  Location: [facility] OR     IR CVC TUNNEL PLACEMENT > 5 YRS OF AGE  11/3/2021     IR CVC TUNNEL REMOVAL RIGHT  12/2/2021     THYROIDECTOMY N/A 5/3/2022    Procedure: total thyroidectomy, Left selective neck dissection level 6 and level 7 ;  Surgeon: Clarita Sepulveda MD;  Location: [facility] OR       Social History     Socioeconomic History     Marital status:      Spouse  name: Not on file     Number of children: Not on file     Years of education: Not on file     Highest education level: Not on file   Occupational History     Not on file   Tobacco Use     Smoking status: Never     Smokeless tobacco: Never   Vaping Use     Vaping status: Not on file   Substance and Sexual Activity     Alcohol use: Not Currently     Drug use: No     Sexual activity: Yes     Partners: Female   Other Topics Concern     Parent/sibling w/ CABG, MI or angioplasty before 65F 55M? No      Service Not Asked     Blood Transfusions Not Asked     Caffeine Concern No     Occupational Exposure Not Asked     Hobby Hazards Not Asked     Sleep Concern Not Asked     Stress Concern Not Asked     Weight Concern Not Asked     Special Diet No     Back Care Not Asked     Exercise No     Bike Helmet Not Asked     Seat Belt Yes     Self-Exams Not Asked   Social History Narrative     Not on file     Social Determinants of Health     Financial Resource Strain: Not on file   Food Insecurity: Not on file   Transportation Needs: Not on file   Physical Activity: Not on file   Stress: Not on file   Social Connections: Not on file   Intimate Partner Violence: Not At Risk (12/8/2022)    Humiliation, Afraid, Rape, and Kick questionnaire      Fear of Current or Ex-Partner: No      Emotionally Abused: No      Physically Abused: No      Sexually Abused: No   Housing Stability: Not on file       FH:  Reviewed with patient.  No family history of lung disease  ROS Pulmonary  A complete ROS was otherwise negative except as noted in the HPI.  BP (!) 143/90 (BP Location: Right arm)   Pulse 95   SpO2 98%   Exam:   GENERAL APPEARANCE: Well developed, well nourished, alert, and in no apparent distress.  EYES: PERRL, EOMI  HENT: Nasal mucosa with no edema and no hyperemia. No nasal polyps.  EARS: Canals clear, TMs normal  MOUTH: Oral mucosa is moist, without any lesions, no tonsillar enlargement, no oropharyngeal exudate.  NECK: supple,  no masses, no thyromegaly.  LYMPHATICS: No significant axillary, cervical, or supraclavicular nodes.  RESP:  Good air flow throughout.  No crackles. No rhonchi. No wheezes.  CV: Normal S1, S2, regular rhythm, normal rate. No murmur.  No rub. No gallop. No LE edema.   ABDOMEN:  Bowel sounds normal, soft, nontender, no HSM or masses.   MS: extremities normal. No clubbing. No cyanosis.  SKIN: no rash on limited exam  NEURO: Mentation intact, speech normal, normal strength and tone, normal gait and stance  PSYCH: mentation appears normal. and affect normal/bright  Results:  Pulmonary function tests from 12-19-22 were personally reviewed  FVC 2.73 (73%), FVC 2.27 (75%), FEV-1/FVC 83%  RC 80%, RV 71%, TLC 71%  cDLCO 78%  No airflow limitation.  Mild restrictive disease.  Normal diffusion capacity. Compared to 10-13-21 the lung volumes are improved  Full PFT's from 4-27-23  FVC 2.61 (69%), FEV-1 2.25 (74%), FEV-1/FVC 86%  FRC 77%, RV 60%, and TLC 66%  Compared to 12-22 there is a < 10% decrease in lung volumes     Recent Results (from the past 168 hour(s))   C. difficile Toxin B PCR with reflex to C. difficile Antigen and Toxins A/B EIA    Collection Time: 04/20/23  4:41 PM    Specimen: Per Rectum; Stool   Result Value Ref Range    C Difficile Toxin B by PCR Negative Negative   Potassium    Collection Time: 04/21/23  6:17 AM   Result Value Ref Range    Potassium 3.3 (L) 3.4 - 5.3 mmol/L   Magnesium    Collection Time: 04/21/23  6:17 AM   Result Value Ref Range    Magnesium 1.7 1.7 - 2.3 mg/dL   Extra Purple Top Tube    Collection Time: 04/21/23  6:17 AM   Result Value Ref Range    Hold Specimen JIC    CBC with platelets    Collection Time: 04/21/23  6:17 AM   Result Value Ref Range    WBC Count 5.9 4.0 - 11.0 10e3/uL    RBC Count 3.20 (L) 4.40 - 5.90 10e6/uL    Hemoglobin 9.4 (L) 13.3 - 17.7 g/dL    Hematocrit 30.0 (L) 40.0 - 53.0 %    MCV 94 78 - 100 fL    MCH 29.4 26.5 - 33.0 pg    MCHC 31.3 (L) 31.5 - 36.5 g/dL    RDW  16.3 (H) 10.0 - 15.0 %    Platelet Count 380 150 - 450 10e3/uL   Basic metabolic panel    Collection Time: 04/21/23  6:17 AM   Result Value Ref Range    Sodium 138 136 - 145 mmol/L    Potassium 3.5 3.4 - 5.3 mmol/L    Chloride 108 (H) 98 - 107 mmol/L    Carbon Dioxide (CO2) 18 (L) 22 - 29 mmol/L    Anion Gap 12 7 - 15 mmol/L    Urea Nitrogen 10.5 6.0 - 20.0 mg/dL    Creatinine 1.36 (H) 0.67 - 1.17 mg/dL    Calcium 8.2 (L) 8.6 - 10.0 mg/dL    Glucose 92 70 - 99 mg/dL    GFR Estimate 62 >60 mL/min/1.73m2   Pulmonary Function Test    Collection Time: 04/27/23  2:24 PM   Result Value Ref Range    FVC-Pred 3.74 L    FVC-Pre 2.61 L    FVC-%Pred-Pre 69 %    FEV1-Pre 2.25 L    FEV1-%Pred-Pre 74 %    FEV1FVC-Pred 80 %    FEV1FVC-Pre 86 %    FEFMax-Pred 8.53 L/sec    FEFMax-Pre 9.14 L/sec    FEFMax-%Pred-Pre 107 %    FEF2575-Pred 2.80 L/sec    FEF2575-Pre 2.84 L/sec    NKH9094-%Pred-Pre 101 %    ExpTime-Pre 6.10 sec    FIFMax-Pre 4.46 L/sec    MEP-Pre 94 cmH2O    MIP-Pre -65 cmH2O    MVV-Pred 131 L/min    MVV-Pre 129 L/min    MVV-%Pred-Pre 98 %    VC-Pred 4.25 L    VC-Pre 2.77 L    VC-%Pred-Pre 65 %    IC-Pred 3.15 L    IC-Pre 1.53 L    IC-%Pred-Pre 48 %    ERV-Pred 1.10 L    ERV-Pre 1.24 L    ERV-%Pred-Pre 112 %    FEV1FEV6-Pred 80 %    FEV1FEV6-Pre 86 %    FRCPleth-Pred 3.26 L    FRCPleth-Pre 2.53 L    FRCPleth-%Pred-Pre 77 %    RVPleth-Pred 2.12 L    RVPleth-Pre 1.29 L    RVPleth-%Pred-Pre 60 %    TLCPleth-Pred 6.11 L    TLCPleth-Pre 4.06 L    TLCPleth-%Pred-Pre 66 %    DLCOunc-Pred 24.45 ml/min/mmHg    DLCOunc-Pre 14.33 ml/min/mmHg    DLCOunc-%Pred-Pre 58 %    DLCOcor-Pre 17.60 ml/min/mmHg    DLCOcor-%Pred-Pre 71 %    VA-Pre 4.04 L    VA-%Pred-Pre 73 %    FEV1SVC-Pred 71 %    FEV1SVC-Pre 81 %       Assessment and plan:   51 YO s/p autologous HSCT with restrictive lung volumes that are not significantly changed since pre-transplant.  CT chest with GGO in bases that are consistent with atelectasis, no findings that are  suggestive of an infection.  Etiology of restrictive PFT's are likely due to statistical spread with component of stature and ethnicity also contributing.  No concerns for an interstitial lung process or neurologic weakness as cause.  No further work-up is necessary.      RTC prn. Advised patient to contact the clinic with any questions or concerns.        Answers for HPI/ROS submitted by the patient on 4/24/2023  General Symptoms: Yes  Skin Symptoms: No  HENT Symptoms: No  EYE SYMPTOMS: No  HEART SYMPTOMS: No  LUNG SYMPTOMS: Yes  INTESTINAL SYMPTOMS: No  URINARY SYMPTOMS: No  REPRODUCTIVE SYMPTOMS: No  SKELETAL SYMPTOMS: Yes  BLOOD SYMPTOMS: No  NERVOUS SYSTEM SYMPTOMS: No  MENTAL HEALTH SYMPTOMS: No  Fever: No  Loss of appetite: No  Weight loss: No  Weight gain: No  Fatigue: Yes  Night sweats: No  Chills: No  Increased stress: No  Excessive hunger: No  Excessive thirst: No  Feeling hot or cold when others believe the temperature is normal: No  Loss of height: No  Post-operative complications: No  Surgical site pain: No  Hallucinations: No  Change in or Loss of Energy: Yes  Hyperactivity: No  Confusion: No  Cough: No  Sputum or phlegm: No  Coughing up blood: No  Difficulty breating or shortness of breath: Yes  Snoring: Yes  Wheezing: No  Difficulty breathing on exertion: Yes  Nighttime Cough: No  Difficulty breathing when lying flat: No  Back pain: Yes  Muscle aches: Yes  Neck pain: Yes  Swollen joints: Yes  Joint pain: Yes  Bone pain: Yes  Muscle cramps: Yes  Muscle weakness: Yes  Joint stiffness: Yes  Bone fracture: No

## 2023-04-27 NOTE — TELEPHONE ENCOUNTER
Vitamin B complex with Vitamin C tablet    Take 1 tablet by mouth daily.    Last Rx: 1/28/22 #90 R3 Dr. Du Newsome    Additional info: na    Pended and routed to provider.

## 2023-04-28 ENCOUNTER — ONCOLOGY VISIT (OUTPATIENT)
Dept: ONCOLOGY | Facility: CLINIC | Age: 53
End: 2023-04-28
Attending: STUDENT IN AN ORGANIZED HEALTH CARE EDUCATION/TRAINING PROGRAM
Payer: COMMERCIAL

## 2023-04-28 ENCOUNTER — APPOINTMENT (OUTPATIENT)
Dept: LAB | Facility: CLINIC | Age: 53
End: 2023-04-28
Attending: REGISTERED NURSE
Payer: COMMERCIAL

## 2023-04-28 ENCOUNTER — ANCILLARY PROCEDURE (OUTPATIENT)
Dept: ULTRASOUND IMAGING | Facility: CLINIC | Age: 53
End: 2023-04-28
Attending: REGISTERED NURSE
Payer: COMMERCIAL

## 2023-04-28 VITALS
WEIGHT: 151.6 LBS | OXYGEN SATURATION: 100 % | BODY MASS INDEX: 24.48 KG/M2 | RESPIRATION RATE: 16 BRPM | SYSTOLIC BLOOD PRESSURE: 114 MMHG | DIASTOLIC BLOOD PRESSURE: 79 MMHG | HEART RATE: 93 BPM | TEMPERATURE: 98.4 F

## 2023-04-28 DIAGNOSIS — R79.89 ELEVATED SERUM CREATININE: ICD-10-CM

## 2023-04-28 DIAGNOSIS — C90.00 MULTIPLE MYELOMA NOT HAVING ACHIEVED REMISSION (H): Primary | ICD-10-CM

## 2023-04-28 DIAGNOSIS — R80.9 PROTEINURIA, UNSPECIFIED TYPE: ICD-10-CM

## 2023-04-28 DIAGNOSIS — M79.10 MUSCLE PAIN: ICD-10-CM

## 2023-04-28 LAB
ALBUMIN SERPL BCG-MCNC: 4.2 G/DL (ref 3.5–5.2)
ALBUMIN UR-MCNC: NEGATIVE MG/DL
ALP SERPL-CCNC: 82 U/L (ref 40–129)
ALT SERPL W P-5'-P-CCNC: 14 U/L (ref 10–50)
ANION GAP SERPL CALCULATED.3IONS-SCNC: 11 MMOL/L (ref 7–15)
APPEARANCE UR: CLEAR
AST SERPL W P-5'-P-CCNC: 20 U/L (ref 10–50)
BASOPHILS # BLD AUTO: 0.1 10E3/UL (ref 0–0.2)
BASOPHILS NFR BLD AUTO: 2 %
BILIRUB SERPL-MCNC: 0.2 MG/DL
BILIRUB UR QL STRIP: NEGATIVE
BUN SERPL-MCNC: 22.3 MG/DL (ref 6–20)
CALCIUM SERPL-MCNC: 9.9 MG/DL (ref 8.6–10)
CHLORIDE SERPL-SCNC: 104 MMOL/L (ref 98–107)
COLOR UR AUTO: YELLOW
CREAT SERPL-MCNC: 1.47 MG/DL (ref 0.67–1.17)
DEPRECATED HCO3 PLAS-SCNC: 22 MMOL/L (ref 22–29)
EOSINOPHIL # BLD AUTO: 0.5 10E3/UL (ref 0–0.7)
EOSINOPHIL NFR BLD AUTO: 8 %
ERYTHROCYTE [DISTWIDTH] IN BLOOD BY AUTOMATED COUNT: 16.9 % (ref 10–15)
GFR SERPL CREATININE-BSD FRML MDRD: 56 ML/MIN/1.73M2
GLUCOSE SERPL-MCNC: 112 MG/DL (ref 70–99)
GLUCOSE UR STRIP-MCNC: NEGATIVE MG/DL
HCT VFR BLD AUTO: 35.2 % (ref 40–53)
HGB BLD-MCNC: 11.2 G/DL (ref 13.3–17.7)
HGB UR QL STRIP: NEGATIVE
HYALINE CASTS: 1 /LPF
IMM GRANULOCYTES # BLD: 0 10E3/UL
IMM GRANULOCYTES NFR BLD: 0 %
KETONES UR STRIP-MCNC: NEGATIVE MG/DL
LEUKOCYTE ESTERASE UR QL STRIP: NEGATIVE
LYMPHOCYTES # BLD AUTO: 2.4 10E3/UL (ref 0.8–5.3)
LYMPHOCYTES NFR BLD AUTO: 36 %
MAGNESIUM SERPL-MCNC: 2.6 MG/DL (ref 1.7–2.3)
MCH RBC QN AUTO: 28.7 PG (ref 26.5–33)
MCHC RBC AUTO-ENTMCNC: 31.8 G/DL (ref 31.5–36.5)
MCV RBC AUTO: 90 FL (ref 78–100)
MONOCYTES # BLD AUTO: 0.8 10E3/UL (ref 0–1.3)
MONOCYTES NFR BLD AUTO: 13 %
MUCOUS THREADS #/AREA URNS LPF: PRESENT /LPF
NEUTROPHILS # BLD AUTO: 2.7 10E3/UL (ref 1.6–8.3)
NEUTROPHILS NFR BLD AUTO: 41 %
NITRATE UR QL: NEGATIVE
NRBC # BLD AUTO: 0 10E3/UL
NRBC BLD AUTO-RTO: 0 /100
PH UR STRIP: 5.5 [PH] (ref 5–7)
PLATELET # BLD AUTO: 511 10E3/UL (ref 150–450)
POTASSIUM SERPL-SCNC: 3.9 MMOL/L (ref 3.4–5.3)
PROT SERPL-MCNC: 6.8 G/DL (ref 6.4–8.3)
RBC # BLD AUTO: 3.9 10E6/UL (ref 4.4–5.9)
RBC URINE: 1 /HPF
SODIUM SERPL-SCNC: 137 MMOL/L (ref 136–145)
SP GR UR STRIP: 1.01 (ref 1–1.03)
UROBILINOGEN UR STRIP-MCNC: NORMAL MG/DL
WBC # BLD AUTO: 6.5 10E3/UL (ref 4–11)
WBC URINE: 1 /HPF

## 2023-04-28 PROCEDURE — 85025 COMPLETE CBC W/AUTO DIFF WBC: CPT | Performed by: REGISTERED NURSE

## 2023-04-28 PROCEDURE — 83735 ASSAY OF MAGNESIUM: CPT | Performed by: REGISTERED NURSE

## 2023-04-28 PROCEDURE — 81003 URINALYSIS AUTO W/O SCOPE: CPT | Performed by: REGISTERED NURSE

## 2023-04-28 PROCEDURE — 36415 COLL VENOUS BLD VENIPUNCTURE: CPT | Performed by: REGISTERED NURSE

## 2023-04-28 PROCEDURE — 99215 OFFICE O/P EST HI 40 MIN: CPT | Performed by: REGISTERED NURSE

## 2023-04-28 PROCEDURE — 76770 US EXAM ABDO BACK WALL COMP: CPT | Mod: GC | Performed by: RADIOLOGY

## 2023-04-28 PROCEDURE — 80053 COMPREHEN METABOLIC PANEL: CPT | Performed by: REGISTERED NURSE

## 2023-04-28 PROCEDURE — G0463 HOSPITAL OUTPT CLINIC VISIT: HCPCS | Performed by: REGISTERED NURSE

## 2023-04-28 ASSESSMENT — PAIN SCALES - GENERAL: PAINLEVEL: NO PAIN (0)

## 2023-04-28 NOTE — NURSING NOTE
"Oncology Rooming Note    April 28, 2023 3:19 PM   June Ronquillo is a 52 year old male who presents for:    Chief Complaint   Patient presents with     Blood Draw     Vpt blood draw by lab RN. Vitals taken and appointment arrived     Initial Vitals: /79   Pulse 93   Temp 98.4  F (36.9  C) (Oral)   Resp 16   Wt 68.8 kg (151 lb 9.6 oz)   SpO2 100%   BMI 24.48 kg/m   Estimated body mass index is 24.48 kg/m  as calculated from the following:    Height as of 4/19/23: 1.676 m (5' 5.98\").    Weight as of this encounter: 68.8 kg (151 lb 9.6 oz). Body surface area is 1.79 meters squared.  No Pain (0) Comment: Data Unavailable   No LMP for male patient.  Allergies reviewed: Yes  Medications reviewed: Yes    Medications: Medication refills not needed today.  Pharmacy name entered into [facility]:    [facility] [place], [place] - [address]  [facility] - [place], [place] - [address] [facility] [place], [place] - [phone]  [facility] [place], [place] - [address] [place] [address]    Clinical concerns: none.       Zuhair Moon"

## 2023-04-28 NOTE — PROGRESS NOTES
Apr 28, 2023  Oncologic Hx:   - 4/30/2021 M spike of 34.8 in urine.M spike in blood 0.2; IgG 702 with depressed IgA and IgM. Beta 2 microglobulin 2.7. Lambda  with K/L ratio of 0.01. WBC 11.1 with absolute eos of 1.0. hgb, plt, Cr (1.1), and albumin WNL.    -4/30/2021 CT abd/pelvis showed sclerotic marrow changes with numerous lytic appearing lesions throughout the imaged portions of the spine, pelvis and ribs.      -PET scan 5/11/2021 Numerous osteolytic lesions which predominantly demonstrate uptake below liver background levels. There is one intraosseous lesion in the left lateral 9th rib that demonstrates uptake above background, possibly due to nondisplaced fracture. Adjacent to this lesion is mild  hypermetabolism to the left pleura, with new small left pleural effusion, suspicious for malignant effusion versus posttraumatic effusion. Pleural uptake may represent extramedullary myeloma extending along the intercostal space versus inflammation.    - BM bx 5/17/2021 with flow showing 4.0% plasma cells which express CD19 (dim), CD38 (bright), CD45 (dim), , and monotypic cytoplasmic lambda immunoglobulin light chains but lack CD20 and CD56.  Hypercellular bone marrow for age (approximately 75% cellularity) with adequate trilineage hematopoiesis. Plasma cell infiltrate: Interstitial, lambda monotypic and representing approximately 60% the bone marrow cellularity, by  immunohistochemical stain. Cytpgenetics with 2 related hypodiploid clones. One with loss of Y, monosomy 13 and 14 (5%) and one with translocation of 8p and 14, derivative 16 with long arm replaced by extra copy 1q,monosomy 21. FISH showed gain of 1q, loss of 13 and 14, IGH-MYC fusion t(8:14)    - Started Miracle-RVd 21 day with velcade on days 1,8,15.     -Cycle 2 Miracle-RVd. Dose reduce dex to 80 mg d/t fatigue and stomach upset on dex days. Also having cough with basilar streaking on CXR  cou- 8/31/2021 BM bx -rare suspicious plasma cells  in touch imprint. No increase in plasma cells by morph.    -8/31/2021 PET/CT Diffuse osteolytic lesions throughout the axial and appendicular skeleton. Increased sclerosis since prior exam 5/11/2021 without increased metabolism or size.  Hypermetabolic pretracheal lymph node as well as hypermetabolic level 2 lymph nodes within the right neck. These lymph nodes are likely reactive from autoimmune activation via medical therapy. Hypermetabolic subcentimeter nodules within the thyroid gland    - 11/11/2021 Autologous BMT    - 2/21/22 started maintenance with Revlimid 10 mg daily and daratumumab monthly; did Revlimid alone for C1 due to low IgG levels, added daratumumab starting C2      Interval Hx:   June is seen in clinic today for hospital follow-up due to RLL pneumonia, discharged on 7 day course of Levaquin which he has completed.  Reports feeling much better from a pneumonia standpoint - no further fevers or chills, no cough, no shortness of breath. He has been holding his Revlimid, Bactrim, and acyclovir.  Drinking 2+ liters of fluid daily. Denies n/v/d/c.     14 comprehensive ROS neg    Physical Exam:  /79   Pulse 93   Temp 98.4  F (36.9  C) (Oral)   Resp 16   Wt 68.8 kg (151 lb 9.6 oz)   SpO2 100%   BMI 24.48 kg/m    Gen: alert, pleasant and conversational, NAD  HEENT: NC/AT,EOMI w/ PERRL, anicteric sclera. OP clear. MMM.   Neck: Supple, no LAD  CV: normal S1,S2 with RRR no m/r/g  Resp: lungs CTA bilaterally with adequate air movement to bases. No wheezes or crackles  Abd: soft NTND no organomegaly or masses. BS normoactive.   Ext: warm and well perfused, no edema or cyanosis  Skin: no concerning lesions or rashes  Neuro: A&Ox4, no lateralizing sx. Grossly nonfocal.  Psych: appropriate, reactive      Assessment and Plan  Logan has multiple myeloma with high risk cytogenetics. He got Miracle-RVD with VGPR, then underwent autologous transplant 11/11/21. On acyclovir daily for viral ppx and bactrim  M/T for PJP ppx currently holding both of these due to elevated creatinine. Given his high risk cytogenetics he started vidya + revlimid maintenance therapy 2/21/22. Follow IgG and replete if it remains <300. He had severe flu-like effects from his first dose of Zometa but he has been tolerating monthly Zometa dosed at 3 mg. Continue Vit D. His last IV daratumumab and zometa given on 04/11/23. Continues on PO revlimid daily.    - Continue to hold Revlimid, Bactrim, and acyclovir until further creatinine work-up is completed.    RLL Pneumonia  Admitted 4/19-4/21 for RLL pneumonia.  Dismissed on 7 day course of levofloxacin which he has completed.  No signs or symptoms of infection today.    Elevated creatinine  Baseline creatinine 1.0-1.1  Creatinine elevation to 1.27 was noted on hospital admission 4/19 did not respond to IV fluid while inpatient.  Creatinine continues to raise, 1.47 today.  He reports drinking 2-3 liters of fluid daily.  No clear fluid losses.  Has been holding Revlimid, Bactrim, and acyclovir since his hospital admission. CT with contrast from 4/19/23 with no obstruction, calculus, or hydronephrosis. UA 4/19 with proteinuria.  - Will repeat UA today  - Obtain renal US today  - Will refer to nephrology due to concern for development of kidney disease in the context of hypertension. Myeloma labs continue to show an ongoing remission, so unlikely to be contributing. No signs or symptoms of dehydration to suggest pre-renal concern.   - Continue to hold Revlimid, Bactrim, and acyclovir through the weekend, will recheck labs early next week to trend creatinine.    Hypogammaglobulinemia Start monthly IV Ig whenever IgG <300 (consider increasing to < 400 due to recurrent infections; awaiting today's result)    Normocytic Anemia: likely d/t revlimid and daratumumab. Will keep monitoring and dose reduce if needed.      55 minutes spent on the date of the encounter doing chart review, review of test results,  patient visit and documentation     PATTY Meza CNP  [facility]  [address] [place] [address] [phone]

## 2023-04-28 NOTE — NURSING NOTE
UA collected in clinic per orders from provider. Name and  verfied with patient. Specimen sent down to first floor lab for processing.      Zuhair Moon on 2023 at 4:03 PM

## 2023-04-28 NOTE — NURSING NOTE
Chief Complaint   Patient presents with     Blood Draw     Vpt blood draw by lab RN. Vitals taken and appointment arrived     Bibi Breaux RN

## 2023-04-28 NOTE — LETTER
4/28/2023         RE: June Ronquillo  [address]  [facility] [address]        Dear Colleague,    Thank you for referring your patient, June Ronquillo, to the [facility]. Please see a copy of my visit note below.    Apr 28, 2023  Oncologic Hx:   - 4/30/2021 M spike of 34.8 in urine.M spike in blood 0.2; IgG 702 with depressed IgA and IgM. Beta 2 microglobulin 2.7. Lambda  with K/L ratio of 0.01. WBC 11.1 with absolute eos of 1.0. hgb, plt, Cr (1.1), and albumin WNL.    -4/30/2021 CT abd/pelvis showed sclerotic marrow changes with numerous lytic appearing lesions throughout the imaged portions of the spine, pelvis and ribs.      -PET scan 5/11/2021 Numerous osteolytic lesions which predominantly demonstrate uptake below liver background levels. There is one intraosseous lesion in the left lateral 9th rib that demonstrates uptake above background, possibly due to nondisplaced fracture. Adjacent to this lesion is mild  hypermetabolism to the left pleura, with new small left pleural effusion, suspicious for malignant effusion versus posttraumatic effusion. Pleural uptake may represent extramedullary myeloma extending along the intercostal space versus inflammation.    - BM bx 5/17/2021 with flow showing 4.0% plasma cells which express CD19 (dim), CD38 (bright), CD45 (dim), , and monotypic cytoplasmic lambda immunoglobulin light chains but lack CD20 and CD56.  Hypercellular bone marrow for age (approximately 75% cellularity) with adequate trilineage hematopoiesis. Plasma cell infiltrate: Interstitial, lambda monotypic and representing approximately 60% the bone marrow cellularity, by  immunohistochemical stain. Cytpgenetics with 2 related hypodiploid clones. One with loss of Y, monosomy 13 and 14 (5%) and one with translocation of 8p and 14, derivative 16 with long arm replaced by extra copy 1q,monosomy 21. FISH showed gain of 1q, loss of 13 and 14,  IGH-MYC fusion t(8:14)    - Started Miracle-RVd 21 day with velcade on days 1,8,15.     -Cycle 2 Miracle-RVd. Dose reduce dex to 80 mg d/t fatigue and stomach upset on dex days. Also having cough with basilar streaking on CXR  cou- 8/31/2021 BM bx -rare suspicious plasma cells in touch imprint. No increase in plasma cells by morph.    -8/31/2021 PET/CT Diffuse osteolytic lesions throughout the axial and appendicular skeleton. Increased sclerosis since prior exam 5/11/2021 without increased metabolism or size.  Hypermetabolic pretracheal lymph node as well as hypermetabolic level 2 lymph nodes within the right neck. These lymph nodes are likely reactive from autoimmune activation via medical therapy. Hypermetabolic subcentimeter nodules within the thyroid gland    - 11/11/2021 Autologous BMT    - 2/21/22 started maintenance with Revlimid 10 mg daily and daratumumab monthly; did Revlimid alone for C1 due to low IgG levels, added daratumumab starting C2      Interval Hx:   June is seen in clinic today for hospital follow-up due to RLL pneumonia, discharged on 7 day course of Levaquin which he has completed.  Reports feeling much better from a pneumonia standpoint - no further fevers or chills, no cough, no shortness of breath. He has been holding his Revlimid, Bactrim, and acyclovir.  Drinking 2+ liters of fluid daily. Denies n/v/d/c.     14 comprehensive ROS neg    Physical Exam:  /79   Pulse 93   Temp 98.4  F (36.9  C) (Oral)   Resp 16   Wt 68.8 kg (151 lb 9.6 oz)   SpO2 100%   BMI 24.48 kg/m    Gen: alert, pleasant and conversational, NAD  HEENT: NC/AT,EOMI w/ PERRL, anicteric sclera. OP clear. MMM.   Neck: Supple, no LAD  CV: normal S1,S2 with RRR no m/r/g  Resp: lungs CTA bilaterally with adequate air movement to bases. No wheezes or crackles  Abd: soft NTND no organomegaly or masses. BS normoactive.   Ext: warm and well perfused, no edema or cyanosis  Skin: no concerning lesions or rashes  Neuro:  A&Ox4, no lateralizing sx. Grossly nonfocal.  Psych: appropriate, reactive      Assessment and Plan  Logan has multiple myeloma with high risk cytogenetics. He got Vidya-RVD with VGPR, then underwent autologous transplant 11/11/21. On acyclovir daily for viral ppx and bactrim M/T for PJP ppx currently holding both of these due to elevated creatinine. Given his high risk cytogenetics he started vidya + revlimid maintenance therapy 2/21/22. Follow IgG and replete if it remains <300. He had severe flu-like effects from his first dose of Zometa but he has been tolerating monthly Zometa dosed at 3 mg. Continue Vit D. His last IV daratumumab and zometa given on 04/11/23. Continues on PO revlimid daily.    - Continue to hold Revlimid, Bactrim, and acyclovir until further creatinine work-up is completed.    RLL Pneumonia  Admitted 4/19-4/21 for RLL pneumonia.  Dismissed on 7 day course of levofloxacin which he has completed.  No signs or symptoms of infection today.    Elevated creatinine  Baseline creatinine 1.0-1.1  Creatinine elevation to 1.27 was noted on hospital admission 4/19 did not respond to IV fluid while inpatient.  Creatinine continues to raise, 1.47 today.  He reports drinking 2-3 liters of fluid daily.  No clear fluid losses.  Has been holding Revlimid, Bactrim, and acyclovir since his hospital admission. CT with contrast from 4/19/23 with no obstruction, calculus, or hydronephrosis. UA 4/19 with proteinuria.  - Will repeat UA today  - Obtain renal US today  - Will refer to nephrology due to concern for development of kidney disease in the context of hypertension. Myeloma labs continue to show an ongoing remission, so unlikely to be contributing. No signs or symptoms of dehydration to suggest pre-renal concern.   - Continue to hold Revlimid, Bactrim, and acyclovir through the weekend, will recheck labs early next week to trend creatinine.    Hypogammaglobulinemia Start monthly IV Ig whenever IgG <300 (consider  increasing to < 400 due to recurrent infections; awaiting today's result)    Normocytic Anemia: likely d/t revlimid and daratumumab. Will keep monitoring and dose reduce if needed.      55 minutes spent on the date of the encounter doing chart review, review of test results, patient visit and documentation     PATTY Meza [facility] [address] [id number] [phone]

## 2023-05-01 ENCOUNTER — LAB (OUTPATIENT)
Dept: LAB | Facility: CLINIC | Age: 53
End: 2023-05-01
Payer: COMMERCIAL

## 2023-05-01 DIAGNOSIS — C90.00 MULTIPLE MYELOMA NOT HAVING ACHIEVED REMISSION (H): ICD-10-CM

## 2023-05-01 LAB
ALBUMIN SERPL BCG-MCNC: 4.3 G/DL (ref 3.5–5.2)
ALP SERPL-CCNC: 78 U/L (ref 40–129)
ALT SERPL W P-5'-P-CCNC: 13 U/L (ref 10–50)
ANION GAP SERPL CALCULATED.3IONS-SCNC: 9 MMOL/L (ref 7–15)
AST SERPL W P-5'-P-CCNC: 22 U/L (ref 10–50)
BASOPHILS # BLD AUTO: 0.1 10E3/UL (ref 0–0.2)
BASOPHILS NFR BLD AUTO: 2 %
BILIRUB SERPL-MCNC: 0.2 MG/DL
BUN SERPL-MCNC: 11.2 MG/DL (ref 6–20)
CALCIUM SERPL-MCNC: 8.8 MG/DL (ref 8.6–10)
CHLORIDE SERPL-SCNC: 110 MMOL/L (ref 98–107)
CREAT SERPL-MCNC: 1.16 MG/DL (ref 0.67–1.17)
DEPRECATED HCO3 PLAS-SCNC: 23 MMOL/L (ref 22–29)
EOSINOPHIL # BLD AUTO: 0.4 10E3/UL (ref 0–0.7)
EOSINOPHIL NFR BLD AUTO: 7 %
ERYTHROCYTE [DISTWIDTH] IN BLOOD BY AUTOMATED COUNT: 17.1 % (ref 10–15)
GFR SERPL CREATININE-BSD FRML MDRD: 75 ML/MIN/1.73M2
GLUCOSE SERPL-MCNC: 106 MG/DL (ref 70–99)
HCT VFR BLD AUTO: 36 % (ref 40–53)
HGB BLD-MCNC: 11.3 G/DL (ref 13.3–17.7)
IMM GRANULOCYTES # BLD: 0 10E3/UL
IMM GRANULOCYTES NFR BLD: 0 %
LYMPHOCYTES # BLD AUTO: 2.3 10E3/UL (ref 0.8–5.3)
LYMPHOCYTES NFR BLD AUTO: 38 %
MCH RBC QN AUTO: 29.3 PG (ref 26.5–33)
MCHC RBC AUTO-ENTMCNC: 31.4 G/DL (ref 31.5–36.5)
MCV RBC AUTO: 93 FL (ref 78–100)
MONOCYTES # BLD AUTO: 0.8 10E3/UL (ref 0–1.3)
MONOCYTES NFR BLD AUTO: 12 %
NEUTROPHILS # BLD AUTO: 2.6 10E3/UL (ref 1.6–8.3)
NEUTROPHILS NFR BLD AUTO: 41 %
NRBC # BLD AUTO: 0 10E3/UL
NRBC BLD AUTO-RTO: 0 /100
PLATELET # BLD AUTO: 507 10E3/UL (ref 150–450)
POTASSIUM SERPL-SCNC: 4.8 MMOL/L (ref 3.4–5.3)
PROT SERPL-MCNC: 6.8 G/DL (ref 6.4–8.3)
RBC # BLD AUTO: 3.86 10E6/UL (ref 4.4–5.9)
SODIUM SERPL-SCNC: 142 MMOL/L (ref 136–145)
TOTAL PROTEIN SERUM FOR ELP: 6.4 G/DL (ref 6.4–8.3)
WBC # BLD AUTO: 6.2 10E3/UL (ref 4–11)

## 2023-05-01 PROCEDURE — 36415 COLL VENOUS BLD VENIPUNCTURE: CPT | Performed by: PATHOLOGY

## 2023-05-01 PROCEDURE — 82784 ASSAY IGA/IGD/IGG/IGM EACH: CPT | Mod: 90 | Performed by: PATHOLOGY

## 2023-05-01 PROCEDURE — 85025 COMPLETE CBC W/AUTO DIFF WBC: CPT | Performed by: PATHOLOGY

## 2023-05-01 PROCEDURE — 83521 IG LIGHT CHAINS FREE EACH: CPT | Mod: 90 | Performed by: PATHOLOGY

## 2023-05-01 PROCEDURE — 84165 PROTEIN E-PHORESIS SERUM: CPT

## 2023-05-01 PROCEDURE — 80053 COMPREHEN METABOLIC PANEL: CPT | Performed by: PATHOLOGY

## 2023-05-01 PROCEDURE — 99000 SPECIMEN HANDLING OFFICE-LAB: CPT | Performed by: PATHOLOGY

## 2023-05-02 LAB
ALBUMIN SERPL ELPH-MCNC: 4.1 G/DL (ref 3.7–5.1)
ALPHA1 GLOB SERPL ELPH-MCNC: 0.4 G/DL (ref 0.2–0.4)
ALPHA2 GLOB SERPL ELPH-MCNC: 0.8 G/DL (ref 0.5–0.9)
B-GLOBULIN SERPL ELPH-MCNC: 0.8 G/DL (ref 0.6–1)
GAMMA GLOB SERPL ELPH-MCNC: 0.4 G/DL (ref 0.7–1.6)
IGA SERPL-MCNC: 79 MG/DL (ref 84–499)
IGG SERPL-MCNC: 469 MG/DL (ref 610–1616)
IGM SERPL-MCNC: 45 MG/DL (ref 35–242)
KAPPA LC FREE SER-MCNC: 0.73 MG/DL (ref 0.33–1.94)
KAPPA LC FREE/LAMBDA FREE SER NEPH: 1.16 {RATIO} (ref 0.26–1.65)
LAMBDA LC FREE SERPL-MCNC: 0.63 MG/DL (ref 0.57–2.63)
M PROTEIN SERPL ELPH-MCNC: 0 G/DL
PROT PATTERN SERPL ELPH-IMP: ABNORMAL

## 2023-05-04 ENCOUNTER — TELEPHONE (OUTPATIENT)
Dept: NEPHROLOGY | Facility: CLINIC | Age: 53
End: 2023-05-04
Payer: COMMERCIAL

## 2023-05-04 NOTE — TELEPHONE ENCOUNTER
Left Voicemail (1st Attempt) for the patient to call back and schedule the following:    Appointment type: Referral appt  Provider: [facility]  Return date: next available  Specialty phone number: [phone] or [phone]  Additional appointment(s) needed: lab  Additonal Notes: n/a

## 2023-05-07 NOTE — PROGRESS NOTES
May 8, 2023  Oncologic Hx:   - 4/30/2021 M spike of 34.8 in urine.M spike in blood 0.2; IgG 702 with depressed IgA and IgM. Beta 2 microglobulin 2.7. Lambda  with K/L ratio of 0.01. WBC 11.1 with absolute eos of 1.0. hgb, plt, Cr (1.1), and albumin WNL.    -4/30/2021 CT abd/pelvis showed sclerotic marrow changes with numerous lytic appearing lesions throughout the imaged portions of the spine, pelvis and ribs.      -PET scan 5/11/2021 Numerous osteolytic lesions which predominantly demonstrate uptake below liver background levels. There is one intraosseous lesion in the left lateral 9th rib that demonstrates uptake above background, possibly due to nondisplaced fracture. Adjacent to this lesion is mild  hypermetabolism to the left pleura, with new small left pleural effusion, suspicious for malignant effusion versus posttraumatic effusion. Pleural uptake may represent extramedullary myeloma extending along the intercostal space versus inflammation.    - BM bx 5/17/2021 with flow showing 4.0% plasma cells which express CD19 (dim), CD38 (bright), CD45 (dim), , and monotypic cytoplasmic lambda immunoglobulin light chains but lack CD20 and CD56.  Hypercellular bone marrow for age (approximately 75% cellularity) with adequate trilineage hematopoiesis. Plasma cell infiltrate: Interstitial, lambda monotypic and representing approximately 60% the bone marrow cellularity, by  immunohistochemical stain. Cytpgenetics with 2 related hypodiploid clones. One with loss of Y, monosomy 13 and 14 (5%) and one with translocation of 8p and 14, derivative 16 with long arm replaced by extra copy 1q,monosomy 21. FISH showed gain of 1q, loss of 13 and 14, IGH-MYC fusion t(8:14)    - Started Miracle-RVd 21 day with velcade on days 1,8,15.     -Cycle 2 Miracle-RVd. Dose reduce dex to 80 mg d/t fatigue and stomach upset on dex days. Also having cough with basilar streaking on CXR  cou- 8/31/2021 BM bx -rare suspicious plasma cells  in touch imprint. No increase in plasma cells by morph.    -8/31/2021 PET/CT Diffuse osteolytic lesions throughout the axial and appendicular skeleton. Increased sclerosis since prior exam 5/11/2021 without increased metabolism or size.  Hypermetabolic pretracheal lymph node as well as hypermetabolic level 2 lymph nodes within the right neck. These lymph nodes are likely reactive from autoimmune activation via medical therapy. Hypermetabolic subcentimeter nodules within the thyroid gland    - 11/11/2021 Autologous BMT    - 2/21/22 started maintenance with Revlimid 10 mg daily and daratumumab monthly; did Revlimid alone for C1 due to low IgG levels, added daratumumab starting C2      Interval Hx:   June is seen in clinic today for routine follow-up.  He has been feeling well since his recent recovery from RLL pneumonia.  Denies fevers, chills, cough, SOB, or CP. He restarted Revlimid and acylcovir last week after his kidney function improved. States his ears have felt a little sore every since his pneumonia.    14 comprehensive ROS neg    Physical Exam:  /78 (BP Location: Right arm, Patient Position: Sitting, Cuff Size: Adult Regular)   Pulse 97   Temp 98.3  F (36.8  C) (Oral)   Resp 16   Wt 70.3 kg (155 lb)   SpO2 99%   BMI 25.03 kg/m    Gen: alert, pleasant and conversational, NAD  HEENT: NC/AT,EOMI w/ PERRL, anicteric sclera. OP clear. MMM. Bilateral TM unable to be visualized due to flaky skin in ear canal, not occlusive but unable to clearly see beyond  Neck: Supple, no LAD  CV: normal S1,S2 with RRR no m/r/g  Resp: lungs CTA bilaterally with adequate air movement to bases. No wheezes or crackles  Abd: soft NTND no organomegaly or masses. BS normoactive.   Ext: warm and well perfused, no edema or cyanosis  Skin: no concerning lesions or rashes  Neuro: A&Ox4, no lateralizing sx. Grossly nonfocal.  Psych: appropriate, reactive      Assessment and Plan  Logan has multiple myeloma with high risk  cytogenetics. He got Miracle-RVD with VGPR, then underwent autologous transplant 11/11/21. On acyclovir daily for viral ppx and bactrim M/T for PJP ppx currently holding both of these due to elevated creatinine. Given his high risk cytogenetics he started miracle + revlimid maintenance therapy 2/21/22. Follow IgG and replete if it remains <300. He had severe flu-like effects from his first dose of Zometa but he has been tolerating monthly Zometa dosed at 3 mg. Continue Vit D. His last IV daratumumab and zometa given on 04/11/23. Continues on PO revlimid daily.  - Restarted Revlimid and acyclovir last week after creatinine improved; no longer in need of Bactrim as he is > 1 year post-transplant.    Elevated creatinine  Baseline creatinine 1.0-1.1  Creatinine elevation to 1.27 was noted on hospital admission 4/19/23 did not respond to IV fluid while inpatient.  Creatinine peaked at 1.47, now back WNL on most recent lab check.  CT with contrast from 4/19/23 with no obstruction, calculus, or hydronephrosis. UA 4/19 with proteinuria.  - Will refer to nephrology due to concern for development of kidney disease in the context of hypertension. Myeloma labs continue to show an ongoing remission, so unlikely to be contributing. No signs or symptoms of dehydration to suggest pre-renal concern.     Ear Pain  Unable to visualize tympanic membranes due to non-occlusive skin debris in ear canal. Discussed that it is not uncommon to have some ET tube dysfunction after a significant respiratory infection.  He is afebrile.  Symptoms most likely represent serous otitis media.  He was instructed to call triage or go to urgent care for further evaluation if he develops fevers or significantly worsening ear pain.    Hypogammaglobulinemia Start monthly IV Ig whenever IgG <300 (consider increasing to < 400 due to recurrent infections)    Normocytic Anemia: likely d/t revlimid and daratumumab. Will keep monitoring and dose reduce if needed.       Immunizations  Received primary COVID series summer 2022.  Will do bivalent booster today (5/8/23).    30 minutes spent on the date of the encounter doing chart review, review of test results, patient visit and documentation     PATTY Meza [facility] [address] [address]  [phone]

## 2023-05-08 ENCOUNTER — INFUSION THERAPY VISIT (OUTPATIENT)
Dept: ONCOLOGY | Facility: CLINIC | Age: 53
End: 2023-05-08
Attending: STUDENT IN AN ORGANIZED HEALTH CARE EDUCATION/TRAINING PROGRAM
Payer: COMMERCIAL

## 2023-05-08 ENCOUNTER — APPOINTMENT (OUTPATIENT)
Dept: LAB | Facility: CLINIC | Age: 53
End: 2023-05-08
Attending: STUDENT IN AN ORGANIZED HEALTH CARE EDUCATION/TRAINING PROGRAM
Payer: COMMERCIAL

## 2023-05-08 VITALS
TEMPERATURE: 98.3 F | SYSTOLIC BLOOD PRESSURE: 114 MMHG | OXYGEN SATURATION: 99 % | RESPIRATION RATE: 16 BRPM | HEART RATE: 97 BPM | BODY MASS INDEX: 25.03 KG/M2 | DIASTOLIC BLOOD PRESSURE: 78 MMHG | WEIGHT: 155 LBS

## 2023-05-08 DIAGNOSIS — C90.00 MULTIPLE MYELOMA NOT HAVING ACHIEVED REMISSION (H): Primary | ICD-10-CM

## 2023-05-08 DIAGNOSIS — R79.89 ELEVATED SERUM CREATININE: Primary | ICD-10-CM

## 2023-05-08 DIAGNOSIS — C90.00 MULTIPLE MYELOMA NOT HAVING ACHIEVED REMISSION (H): ICD-10-CM

## 2023-05-08 LAB
ALBUMIN SERPL BCG-MCNC: 4.3 G/DL (ref 3.5–5.2)
ALP SERPL-CCNC: 75 U/L (ref 40–129)
ALT SERPL W P-5'-P-CCNC: 15 U/L (ref 10–50)
ANION GAP SERPL CALCULATED.3IONS-SCNC: 10 MMOL/L (ref 7–15)
AST SERPL W P-5'-P-CCNC: 27 U/L (ref 10–50)
BASOPHILS # BLD AUTO: 0 10E3/UL (ref 0–0.2)
BASOPHILS NFR BLD AUTO: 1 %
BILIRUB SERPL-MCNC: 0.2 MG/DL
BUN SERPL-MCNC: 13.7 MG/DL (ref 6–20)
CALCIUM SERPL-MCNC: 8.9 MG/DL (ref 8.6–10)
CHLORIDE SERPL-SCNC: 108 MMOL/L (ref 98–107)
CREAT SERPL-MCNC: 1.13 MG/DL (ref 0.67–1.17)
DEPRECATED HCO3 PLAS-SCNC: 23 MMOL/L (ref 22–29)
EOSINOPHIL # BLD AUTO: 1.1 10E3/UL (ref 0–0.7)
EOSINOPHIL NFR BLD AUTO: 18 %
ERYTHROCYTE [DISTWIDTH] IN BLOOD BY AUTOMATED COUNT: 17.2 % (ref 10–15)
GFR SERPL CREATININE-BSD FRML MDRD: 77 ML/MIN/1.73M2
GLUCOSE SERPL-MCNC: 135 MG/DL (ref 70–99)
HCT VFR BLD AUTO: 34.2 % (ref 40–53)
HGB BLD-MCNC: 11 G/DL (ref 13.3–17.7)
IMM GRANULOCYTES # BLD: 0 10E3/UL
IMM GRANULOCYTES NFR BLD: 0 %
LYMPHOCYTES # BLD AUTO: 1.5 10E3/UL (ref 0.8–5.3)
LYMPHOCYTES NFR BLD AUTO: 25 %
MCH RBC QN AUTO: 28.9 PG (ref 26.5–33)
MCHC RBC AUTO-ENTMCNC: 32.2 G/DL (ref 31.5–36.5)
MCV RBC AUTO: 90 FL (ref 78–100)
MONOCYTES # BLD AUTO: 0.6 10E3/UL (ref 0–1.3)
MONOCYTES NFR BLD AUTO: 10 %
NEUTROPHILS # BLD AUTO: 2.9 10E3/UL (ref 1.6–8.3)
NEUTROPHILS NFR BLD AUTO: 46 %
NRBC # BLD AUTO: 0 10E3/UL
NRBC BLD AUTO-RTO: 0 /100
PLATELET # BLD AUTO: 404 10E3/UL (ref 150–450)
POTASSIUM SERPL-SCNC: 4 MMOL/L (ref 3.4–5.3)
PROT SERPL-MCNC: 6.5 G/DL (ref 6.4–8.3)
RBC # BLD AUTO: 3.81 10E6/UL (ref 4.4–5.9)
SODIUM SERPL-SCNC: 141 MMOL/L (ref 136–145)
TOTAL PROTEIN SERUM FOR ELP: 6.2 G/DL (ref 6.4–8.3)
WBC # BLD AUTO: 6.1 10E3/UL (ref 4–11)

## 2023-05-08 PROCEDURE — 0124A HC ADMIN COVID VAC PFIZER 12+ BIVAL ADDITIONAL: CPT | Performed by: REGISTERED NURSE

## 2023-05-08 PROCEDURE — 84165 PROTEIN E-PHORESIS SERUM: CPT | Mod: 26

## 2023-05-08 PROCEDURE — 84155 ASSAY OF PROTEIN SERUM: CPT | Performed by: REGISTERED NURSE

## 2023-05-08 PROCEDURE — 80053 COMPREHEN METABOLIC PANEL: CPT | Performed by: REGISTERED NURSE

## 2023-05-08 PROCEDURE — 250N000011 HC RX IP 250 OP 636: Performed by: STUDENT IN AN ORGANIZED HEALTH CARE EDUCATION/TRAINING PROGRAM

## 2023-05-08 PROCEDURE — 84165 PROTEIN E-PHORESIS SERUM: CPT | Mod: TC | Performed by: STUDENT IN AN ORGANIZED HEALTH CARE EDUCATION/TRAINING PROGRAM

## 2023-05-08 PROCEDURE — 86334 IMMUNOFIX E-PHORESIS SERUM: CPT | Performed by: STUDENT IN AN ORGANIZED HEALTH CARE EDUCATION/TRAINING PROGRAM

## 2023-05-08 PROCEDURE — 96365 THER/PROPH/DIAG IV INF INIT: CPT

## 2023-05-08 PROCEDURE — 36415 COLL VENOUS BLD VENIPUNCTURE: CPT | Performed by: REGISTERED NURSE

## 2023-05-08 PROCEDURE — 85025 COMPLETE CBC W/AUTO DIFF WBC: CPT | Performed by: REGISTERED NURSE

## 2023-05-08 PROCEDURE — 96401 CHEMO ANTI-NEOPL SQ/IM: CPT

## 2023-05-08 PROCEDURE — 86334 IMMUNOFIX E-PHORESIS SERUM: CPT | Mod: 26

## 2023-05-08 PROCEDURE — 83521 IG LIGHT CHAINS FREE EACH: CPT | Performed by: REGISTERED NURSE

## 2023-05-08 PROCEDURE — 250N000011 HC RX IP 250 OP 636: Performed by: REGISTERED NURSE

## 2023-05-08 PROCEDURE — 91312 HC RX IP 250 OP 636: CPT | Performed by: REGISTERED NURSE

## 2023-05-08 PROCEDURE — 99214 OFFICE O/P EST MOD 30 MIN: CPT | Performed by: REGISTERED NURSE

## 2023-05-08 PROCEDURE — 82784 ASSAY IGA/IGD/IGG/IGM EACH: CPT | Performed by: REGISTERED NURSE

## 2023-05-08 PROCEDURE — 258N000003 HC RX IP 258 OP 636: Performed by: STUDENT IN AN ORGANIZED HEALTH CARE EDUCATION/TRAINING PROGRAM

## 2023-05-08 PROCEDURE — G0463 HOSPITAL OUTPT CLINIC VISIT: HCPCS | Mod: 25 | Performed by: REGISTERED NURSE

## 2023-05-08 RX ORDER — LENALIDOMIDE 10 MG/1
10 CAPSULE ORAL DAILY
Qty: 28 CAPSULE | Refills: 0 | Status: SHIPPED | OUTPATIENT
Start: 2023-05-08 | End: 2023-06-26

## 2023-05-08 RX ADMIN — ZOLEDRONIC ACID 3 MG: 4 INJECTION INTRAVENOUS at 14:09

## 2023-05-08 RX ADMIN — SODIUM CHLORIDE 250 ML: 9 INJECTION, SOLUTION INTRAVENOUS at 14:09

## 2023-05-08 RX ADMIN — DARATUMUMAB AND HYALURONIDASE-FIHJ (HUMAN RECOMBINANT) 1800 MG: 1800; 30000 INJECTION SUBCUTANEOUS at 14:13

## 2023-05-08 RX ADMIN — BNT162B2 ORIGINAL AND OMICRON BA.4/BA.5 30 MCG: .1125; .1125 INJECTION, SUSPENSION INTRAMUSCULAR at 14:22

## 2023-05-08 ASSESSMENT — PAIN SCALES - GENERAL: PAINLEVEL: SEVERE PAIN (6)

## 2023-05-08 NOTE — PROGRESS NOTES
Infusion Nursing Note:  June Ronquillo presents today for Cycle 17 Day 1 Darzalex Faspro, Zometa.    Patient seen by provider today: Yes: Laura MEANS NP   present during visit today: Not Applicable.    Note: Patient confirms he is taking vitamin D supplemends and Revlimid. Patient states he is having some congestion in his ears, but declines intervention for pain. He will try OTC tylenol/claritin when he gets home.       Intravenous Access:  Peripheral IV placed.    Treatment Conditions:  Lab Results   Component Value Date    HGB 11.0 (L) 05/08/2023    WBC 6.1 05/08/2023    ANEU 4.7 03/13/2023    ANEUTAUTO 2.9 05/08/2023     05/08/2023      Lab Results   Component Value Date     05/08/2023    POTASSIUM 4.0 05/08/2023    MAG 2.6 (H) 04/28/2023    CR 1.13 05/08/2023    HARDEEP 8.9 05/08/2023    BILITOTAL 0.2 05/08/2023    ALBUMIN 4.3 05/08/2023    ALT 15 05/08/2023    AST 27 05/08/2023     Results reviewed, labs MET treatment parameters, ok to proceed with treatment.      Post Infusion Assessment:  Patient tolerated infusion without incident.  Patient tolerated Darzalex injection without incident to LEFT abdomen.  COVID bivalent booster given in right deltoid per Laura's request.  Access discontinued per protocol.       Discharge Plan:   Patient declined prescription refills.  AVS to patient via [facility].  Patient will return 6/5 for next appointment.   Patient discharged in stable condition accompanied by: self.  Departure Mode: Ambulatory.  Face to Face time: 0.      Cindy Rayo RN

## 2023-05-08 NOTE — LETTER
5/8/2023         RE: June Ronquillo  [address]  [facility] [address]        Dear Colleague,    Thank you for referring your patient, June Ronquillo, to the [facility]. Please see a copy of my visit note below.    May 8, 2023  Oncologic Hx:   - 4/30/2021 M spike of 34.8 in urine.M spike in blood 0.2; IgG 702 with depressed IgA and IgM. Beta 2 microglobulin 2.7. Lambda  with K/L ratio of 0.01. WBC 11.1 with absolute eos of 1.0. hgb, plt, Cr (1.1), and albumin WNL.    -4/30/2021 CT abd/pelvis showed sclerotic marrow changes with numerous lytic appearing lesions throughout the imaged portions of the spine, pelvis and ribs.      -PET scan 5/11/2021 Numerous osteolytic lesions which predominantly demonstrate uptake below liver background levels. There is one intraosseous lesion in the left lateral 9th rib that demonstrates uptake above background, possibly due to nondisplaced fracture. Adjacent to this lesion is mild  hypermetabolism to the left pleura, with new small left pleural effusion, suspicious for malignant effusion versus posttraumatic effusion. Pleural uptake may represent extramedullary myeloma extending along the intercostal space versus inflammation.    - BM bx 5/17/2021 with flow showing 4.0% plasma cells which express CD19 (dim), CD38 (bright), CD45 (dim), , and monotypic cytoplasmic lambda immunoglobulin light chains but lack CD20 and CD56.  Hypercellular bone marrow for age (approximately 75% cellularity) with adequate trilineage hematopoiesis. Plasma cell infiltrate: Interstitial, lambda monotypic and representing approximately 60% the bone marrow cellularity, by  immunohistochemical stain. Cytpgenetics with 2 related hypodiploid clones. One with loss of Y, monosomy 13 and 14 (5%) and one with translocation of 8p and 14, derivative 16 with long arm replaced by extra copy 1q,monosomy 21. FISH showed gain of 1q, loss of 13 and 14,  IGH-MYC fusion t(8:14)    - Started Miracle-RVd 21 day with velcade on days 1,8,15.     -Cycle 2 Miracle-RVd. Dose reduce dex to 80 mg d/t fatigue and stomach upset on dex days. Also having cough with basilar streaking on CXR  cou- 8/31/2021 BM bx -rare suspicious plasma cells in touch imprint. No increase in plasma cells by morph.    -8/31/2021 PET/CT Diffuse osteolytic lesions throughout the axial and appendicular skeleton. Increased sclerosis since prior exam 5/11/2021 without increased metabolism or size.  Hypermetabolic pretracheal lymph node as well as hypermetabolic level 2 lymph nodes within the right neck. These lymph nodes are likely reactive from autoimmune activation via medical therapy. Hypermetabolic subcentimeter nodules within the thyroid gland    - 11/11/2021 Autologous BMT    - 2/21/22 started maintenance with Revlimid 10 mg daily and daratumumab monthly; did Revlimid alone for C1 due to low IgG levels, added daratumumab starting C2      Interval Hx:   June is seen in clinic today for routine follow-up.  He has been feeling well since his recent recovery from RLL pneumonia.  Denies fevers, chills, cough, SOB, or CP. He restarted Revlimid and acylcovir last week after his kidney function improved. States his ears have felt a little sore every since his pneumonia.    14 comprehensive ROS neg    Physical Exam:  /78 (BP Location: Right arm, Patient Position: Sitting, Cuff Size: Adult Regular)   Pulse 97   Temp 98.3  F (36.8  C) (Oral)   Resp 16   Wt 70.3 kg (155 lb)   SpO2 99%   BMI 25.03 kg/m    Gen: alert, pleasant and conversational, NAD  HEENT: NC/AT,EOMI w/ PERRL, anicteric sclera. OP clear. MMM. Bilateral TM unable to be visualized due to flaky skin in ear canal, not occlusive but unable to clearly see beyond  Neck: Supple, no LAD  CV: normal S1,S2 with RRR no m/r/g  Resp: lungs CTA bilaterally with adequate air movement to bases. No wheezes or crackles  Abd: soft NTND no  organomegaly or masses. BS normoactive.   Ext: warm and well perfused, no edema or cyanosis  Skin: no concerning lesions or rashes  Neuro: A&Ox4, no lateralizing sx. Grossly nonfocal.  Psych: appropriate, reactive      Assessment and Plan  Logan has multiple myeloma with high risk cytogenetics. He got Vidya-RVD with VGPR, then underwent autologous transplant 11/11/21. On acyclovir daily for viral ppx and bactrim M/T for PJP ppx currently holding both of these due to elevated creatinine. Given his high risk cytogenetics he started vidya + revlimid maintenance therapy 2/21/22. Follow IgG and replete if it remains <300. He had severe flu-like effects from his first dose of Zometa but he has been tolerating monthly Zometa dosed at 3 mg. Continue Vit D. His last IV daratumumab and zometa given on 04/11/23. Continues on PO revlimid daily.  - Restarted Revlimid and acyclovir last week after creatinine improved; no longer in need of Bactrim as he is > 1 year post-transplant.    Elevated creatinine  Baseline creatinine 1.0-1.1  Creatinine elevation to 1.27 was noted on hospital admission 4/19/23 did not respond to IV fluid while inpatient.  Creatinine peaked at 1.47, now back WNL on most recent lab check.  CT with contrast from 4/19/23 with no obstruction, calculus, or hydronephrosis. UA 4/19 with proteinuria.  - Will refer to nephrology due to concern for development of kidney disease in the context of hypertension. Myeloma labs continue to show an ongoing remission, so unlikely to be contributing. No signs or symptoms of dehydration to suggest pre-renal concern.     Ear Pain  Unable to visualize tympanic membranes due to non-occlusive skin debris in ear canal. Discussed that it is not uncommon to have some ET tube dysfunction after a significant respiratory infection.  He is afebrile.  Symptoms most likely represent serous otitis media.  He was instructed to call triage or go to urgent care for further evaluation if he  develops fevers or significantly worsening ear pain.    Hypogammaglobulinemia Start monthly IV Ig whenever IgG <300 (consider increasing to < 400 due to recurrent infections)    Normocytic Anemia: likely d/t revlimid and daratumumab. Will keep monitoring and dose reduce if needed.      Immunizations  Received primary COVID series summer 2022.  Will do bivalent booster today (5/8/23).    30 minutes spent on the date of the encounter doing chart review, review of test results, patient visit and documentation     PATTY Meza [facility] [address] [address]  [phone]

## 2023-05-08 NOTE — NURSING NOTE
"Oncology Rooming Note    May 8, 2023 12:56 PM   June Ronquillo is a 52 year old male who presents for:    Chief Complaint   Patient presents with     Blood Draw     Labs drawn with piv start by rn.  VS taken.     Oncology Clinic Visit     Multiple Myeloma     Initial Vitals: /78 (BP Location: Right arm, Patient Position: Sitting, Cuff Size: Adult Regular)   Pulse 97   Temp 98.3  F (36.8  C) (Oral)   Resp 16   Wt 70.3 kg (155 lb)   SpO2 99%   BMI 25.03 kg/m   Estimated body mass index is 25.03 kg/m  as calculated from the following:    Height as of 4/19/23: 1.676 m (5' 5.98\").    Weight as of this encounter: 70.3 kg (155 lb). Body surface area is 1.81 meters squared.  Severe Pain (6) Comment: Data Unavailable   No LMP for male patient.  Allergies reviewed: Yes  Medications reviewed: Yes    Medications: Medication refills not needed today.  Pharmacy name entered into [employer] With:    [facility] [place], [place] - [address]  [facility] - [place], [place] - [address] [facility] [place], [place] - [address] [place] [address] [phone]  [facility] [place], [place] - [address] [facility]-    Clinical concerns: Pt presents today for f/uRenée Daley LPN  5/8/2023              "

## 2023-05-08 NOTE — NURSING NOTE
Chief Complaint   Patient presents with     Blood Draw     Labs drawn with piv start by rn.  VS taken.     Labs drawn with PIV start by rn.  Pt tolerated well.  VS taken and pt checked in for next appt.    Aubrie Fleming RN

## 2023-05-09 ENCOUNTER — TELEPHONE (OUTPATIENT)
Dept: ONCOLOGY | Facility: CLINIC | Age: 53
End: 2023-05-09
Payer: COMMERCIAL

## 2023-05-09 LAB
ALBUMIN SERPL ELPH-MCNC: 4 G/DL (ref 3.7–5.1)
ALPHA1 GLOB SERPL ELPH-MCNC: 0.3 G/DL (ref 0.2–0.4)
ALPHA2 GLOB SERPL ELPH-MCNC: 0.7 G/DL (ref 0.5–0.9)
B-GLOBULIN SERPL ELPH-MCNC: 0.8 G/DL (ref 0.6–1)
DLCOCOR-%PRED-PRE: 71 %
DLCOCOR-PRE: 17.61 ML/MIN/MMHG
DLCOUNC-%PRED-PRE: 58 %
DLCOUNC-PRE: 14.34 ML/MIN/MMHG
DLCOUNC-PRED: 24.46 ML/MIN/MMHG
ERV-%PRED-PRE: 112 %
ERV-PRE: 1.24 L
ERV-PRED: 1.1 L
EXPTIME-PRE: 6.1 SEC
FEF2575-%PRED-PRE: 101 %
FEF2575-PRE: 2.84 L/SEC
FEF2575-PRED: 2.8 L/SEC
FEFMAX-%PRED-PRE: 107 %
FEFMAX-PRE: 9.14 L/SEC
FEFMAX-PRED: 8.53 L/SEC
FEV1-%PRED-PRE: 74 %
FEV1-PRE: 2.25 L
FEV1FEV6-PRE: 86 %
FEV1FEV6-PRED: 80 %
FEV1FVC-PRE: 86 %
FEV1FVC-PRED: 80 %
FEV1SVC-PRE: 81 %
FEV1SVC-PRED: 71 %
FIFMAX-PRE: 4.46 L/SEC
FRCPLETH-%PRED-PRE: 77 %
FRCPLETH-PRE: 2.53 L
FRCPLETH-PRED: 3.26 L
FVC-%PRED-PRE: 69 %
FVC-PRE: 2.61 L
FVC-PRED: 3.74 L
GAMMA GLOB SERPL ELPH-MCNC: 0.4 G/DL (ref 0.7–1.6)
IC-%PRED-PRE: 48 %
IC-PRE: 1.53 L
IC-PRED: 3.15 L
IGA SERPL-MCNC: 65 MG/DL (ref 84–499)
IGG SERPL-MCNC: 417 MG/DL (ref 610–1616)
IGM SERPL-MCNC: 38 MG/DL (ref 35–242)
KAPPA LC FREE SER-MCNC: 0.78 MG/DL (ref 0.33–1.94)
KAPPA LC FREE/LAMBDA FREE SER NEPH: 1.59 {RATIO} (ref 0.26–1.65)
LAMBDA LC FREE SERPL-MCNC: 0.49 MG/DL (ref 0.57–2.63)
M PROTEIN SERPL ELPH-MCNC: 0 G/DL
MEP-PRE: 94 CMH2O
MIP-PRE: -65 CMH2O
MVV-%PRED-PRE: 98 %
MVV-PRE: 129 L/MIN
MVV-PRED: 131 L/MIN
PROT PATTERN SERPL ELPH-IMP: ABNORMAL
PROT PATTERN SERPL IFE-IMP: NORMAL
RVPLETH-%PRED-PRE: 60 %
RVPLETH-PRE: 1.29 L
RVPLETH-PRED: 2.12 L
TLCPLETH-%PRED-PRE: 66 %
TLCPLETH-PRE: 4.06 L
TLCPLETH-PRED: 6.11 L
VA-%PRED-PRE: 73 %
VA-PRE: 4.04 L
VC-%PRED-PRE: 65 %
VC-PRE: 2.77 L
VC-PRED: 4.25 L

## 2023-05-09 NOTE — TELEPHONE ENCOUNTER
Oral Chemotherapy Monitoring Program   Medication: Revlimid  Rx: 10mg PO daily on days 1 through 28 of 28 day cycle   Auth #: [id number]   Risk Category: Adult Male  Routine survey questions reviewed.   Rx to be Escribed to [facility]    Yu Goldberg  [facility]  Oncology Pharmacy Liaison   [email or web address]  [phone] (phone)  [phone] (fax)

## 2023-05-10 NOTE — CONFIDENTIAL NOTE
DIAGNOSIS:   : Multiple myeloma not having achieved remission (H)   Elevated serum creatinine   Proteinuria, unspecified type      DATE RECEIVED: 06.05.2023   NOTES STATUS DETAILS   OFFICE NOTE from referring provider Internal 04.28.2023 Laura Ballesteros APRN CNP   OFFICE NOTE from other specialist      *Only VASCULITIS or LUPUS gather office notes for the following     *PULMONARY       *ENT     *DERMATOLOGY     *RHEUMATOLOGY     DISCHARGE SUMMARY from hospital     DISCHARGE REPORT from the ER     MEDICATION LIST Internal    IMAGING  (NEED IMAGES AND REPORTS)     KIDNEY CT SCAN     KIDNEY ULTRASOUND Internal 04.28.2023 US RENAL COMPLETE NON-VASCULAR   MR ABDOMEN     NUCLEAR MEDICINE RENAL     LABS     CBC Internal 05.08.2023   CMP Internal 05.08.2023   BMP Internal 04.21.2023   UA Internal 04.28.2023   URINE PROTEIN Internal 04.28.2023   RENAL PANEL     BIOPSY     KIDNEY BIOPSY

## 2023-05-22 DIAGNOSIS — C90.00 MULTIPLE MYELOMA NOT HAVING ACHIEVED REMISSION (H): ICD-10-CM

## 2023-05-22 NOTE — TELEPHONE ENCOUNTER
calcium carbonate-vitamin D (OSCAL) Refill   Last prescribing provider: Laura ballesteros     Last clinic visit date: 5/8/23 Laura Ballesteros     Recommendations for requested medication (if none, N/A): N/A    Any other pertinent information (if none, N/A): N/A    Refilled: Y/N, if NO, why?

## 2023-05-30 DIAGNOSIS — I10 HYPERTENSION, UNSPECIFIED TYPE: Primary | ICD-10-CM

## 2023-06-05 ENCOUNTER — APPOINTMENT (OUTPATIENT)
Dept: LAB | Facility: CLINIC | Age: 53
End: 2023-06-05
Attending: STUDENT IN AN ORGANIZED HEALTH CARE EDUCATION/TRAINING PROGRAM
Payer: COMMERCIAL

## 2023-06-05 ENCOUNTER — LAB (OUTPATIENT)
Dept: LAB | Facility: CLINIC | Age: 53
End: 2023-06-05
Attending: INTERNAL MEDICINE
Payer: COMMERCIAL

## 2023-06-05 ENCOUNTER — ONCOLOGY VISIT (OUTPATIENT)
Dept: ONCOLOGY | Facility: CLINIC | Age: 53
End: 2023-06-05
Attending: STUDENT IN AN ORGANIZED HEALTH CARE EDUCATION/TRAINING PROGRAM
Payer: COMMERCIAL

## 2023-06-05 ENCOUNTER — OFFICE VISIT (OUTPATIENT)
Dept: NEPHROLOGY | Facility: CLINIC | Age: 53
End: 2023-06-05
Attending: INTERNAL MEDICINE
Payer: COMMERCIAL

## 2023-06-05 ENCOUNTER — PRE VISIT (OUTPATIENT)
Dept: NEPHROLOGY | Facility: CLINIC | Age: 53
End: 2023-06-05

## 2023-06-05 VITALS
DIASTOLIC BLOOD PRESSURE: 77 MMHG | WEIGHT: 156.3 LBS | SYSTOLIC BLOOD PRESSURE: 127 MMHG | BODY MASS INDEX: 25.12 KG/M2 | OXYGEN SATURATION: 100 % | TEMPERATURE: 97.9 F | HEART RATE: 51 BPM | RESPIRATION RATE: 20 BRPM | HEIGHT: 66 IN

## 2023-06-05 VITALS
BODY MASS INDEX: 25.32 KG/M2 | OXYGEN SATURATION: 100 % | DIASTOLIC BLOOD PRESSURE: 72 MMHG | SYSTOLIC BLOOD PRESSURE: 118 MMHG | WEIGHT: 156.8 LBS | HEART RATE: 66 BPM | TEMPERATURE: 98.3 F | RESPIRATION RATE: 16 BRPM

## 2023-06-05 DIAGNOSIS — C90.00 MULTIPLE MYELOMA NOT HAVING ACHIEVED REMISSION (H): ICD-10-CM

## 2023-06-05 DIAGNOSIS — D69.6 THROMBOCYTOPENIA, UNSPECIFIED (H): ICD-10-CM

## 2023-06-05 DIAGNOSIS — N17.9 AKI (ACUTE KIDNEY INJURY) (H): Primary | ICD-10-CM

## 2023-06-05 DIAGNOSIS — Z94.84 STEM CELLS TRANSPLANT STATUS (H): ICD-10-CM

## 2023-06-05 DIAGNOSIS — C90.01 MULTIPLE MYELOMA IN REMISSION (H): Primary | ICD-10-CM

## 2023-06-05 DIAGNOSIS — C90.00 MULTIPLE MYELOMA NOT HAVING ACHIEVED REMISSION (H): Primary | ICD-10-CM

## 2023-06-05 DIAGNOSIS — I10 HYPERTENSION, UNSPECIFIED TYPE: ICD-10-CM

## 2023-06-05 LAB
ALBUMIN MFR UR ELPH: 11.9 MG/DL (ref 1–14)
ALBUMIN SERPL BCG-MCNC: 4.1 G/DL (ref 3.5–5.2)
ALBUMIN SERPL BCG-MCNC: 4.3 G/DL (ref 3.5–5.2)
ALBUMIN UR-MCNC: 10 MG/DL
ALP SERPL-CCNC: 52 U/L (ref 40–129)
ALP SERPL-CCNC: 56 U/L (ref 40–129)
ALT SERPL W P-5'-P-CCNC: 17 U/L (ref 10–50)
ALT SERPL W P-5'-P-CCNC: 17 U/L (ref 10–50)
ANION GAP SERPL CALCULATED.3IONS-SCNC: 7 MMOL/L (ref 7–15)
ANION GAP SERPL CALCULATED.3IONS-SCNC: 9 MMOL/L (ref 7–15)
APPEARANCE UR: CLEAR
AST SERPL W P-5'-P-CCNC: 26 U/L (ref 10–50)
AST SERPL W P-5'-P-CCNC: 33 U/L (ref 10–50)
BASOPHILS # BLD AUTO: 0 10E3/UL (ref 0–0.2)
BASOPHILS # BLD AUTO: 0 10E3/UL (ref 0–0.2)
BASOPHILS NFR BLD AUTO: 1 %
BASOPHILS NFR BLD AUTO: 1 %
BILIRUB SERPL-MCNC: 0.4 MG/DL
BILIRUB SERPL-MCNC: 0.5 MG/DL
BILIRUB UR QL STRIP: NEGATIVE
BUN SERPL-MCNC: 16.2 MG/DL (ref 6–20)
BUN SERPL-MCNC: 17.3 MG/DL (ref 6–20)
CALCIUM SERPL-MCNC: 8.6 MG/DL (ref 8.6–10)
CALCIUM SERPL-MCNC: 8.7 MG/DL (ref 8.6–10)
CHLORIDE SERPL-SCNC: 105 MMOL/L (ref 98–107)
CHLORIDE SERPL-SCNC: 107 MMOL/L (ref 98–107)
COLOR UR AUTO: ABNORMAL
CREAT SERPL-MCNC: 1.36 MG/DL (ref 0.67–1.17)
CREAT SERPL-MCNC: 1.47 MG/DL (ref 0.67–1.17)
CREAT UR-MCNC: 119 MG/DL
DEPRECATED CALCIDIOL+CALCIFEROL SERPL-MC: 34 UG/L (ref 20–75)
DEPRECATED HCO3 PLAS-SCNC: 23 MMOL/L (ref 22–29)
DEPRECATED HCO3 PLAS-SCNC: 24 MMOL/L (ref 22–29)
EOSINOPHIL # BLD AUTO: 0.2 10E3/UL (ref 0–0.7)
EOSINOPHIL # BLD AUTO: 0.3 10E3/UL (ref 0–0.7)
EOSINOPHIL NFR BLD AUTO: 5 %
EOSINOPHIL NFR BLD AUTO: 7 %
ERYTHROCYTE [DISTWIDTH] IN BLOOD BY AUTOMATED COUNT: 17.2 % (ref 10–15)
ERYTHROCYTE [DISTWIDTH] IN BLOOD BY AUTOMATED COUNT: 17.2 % (ref 10–15)
GFR SERPL CREATININE-BSD FRML MDRD: 56 ML/MIN/1.73M2
GFR SERPL CREATININE-BSD FRML MDRD: 62 ML/MIN/1.73M2
GLUCOSE SERPL-MCNC: 100 MG/DL (ref 70–99)
GLUCOSE SERPL-MCNC: 112 MG/DL (ref 70–99)
GLUCOSE UR STRIP-MCNC: NEGATIVE MG/DL
HCT VFR BLD AUTO: 32.9 % (ref 40–53)
HCT VFR BLD AUTO: 33.2 % (ref 40–53)
HGB BLD-MCNC: 10.6 G/DL (ref 13.3–17.7)
HGB BLD-MCNC: 10.8 G/DL (ref 13.3–17.7)
HGB UR QL STRIP: NEGATIVE
IGA SERPL-MCNC: 66 MG/DL (ref 84–499)
IGG SERPL-MCNC: 513 MG/DL (ref 610–1616)
IGM SERPL-MCNC: 41 MG/DL (ref 35–242)
IMM GRANULOCYTES # BLD: 0 10E3/UL
IMM GRANULOCYTES # BLD: 0 10E3/UL
IMM GRANULOCYTES NFR BLD: 0 %
IMM GRANULOCYTES NFR BLD: 0 %
KAPPA LC FREE SER-MCNC: 1.53 MG/DL (ref 0.33–1.94)
KAPPA LC FREE/LAMBDA FREE SER NEPH: 2.13 {RATIO} (ref 0.26–1.65)
KETONES UR STRIP-MCNC: NEGATIVE MG/DL
LAMBDA LC FREE SERPL-MCNC: 0.72 MG/DL (ref 0.57–2.63)
LEUKOCYTE ESTERASE UR QL STRIP: NEGATIVE
LYMPHOCYTES # BLD AUTO: 1.5 10E3/UL (ref 0.8–5.3)
LYMPHOCYTES # BLD AUTO: 1.7 10E3/UL (ref 0.8–5.3)
LYMPHOCYTES NFR BLD AUTO: 34 %
LYMPHOCYTES NFR BLD AUTO: 37 %
MCH RBC QN AUTO: 29.9 PG (ref 26.5–33)
MCH RBC QN AUTO: 30 PG (ref 26.5–33)
MCHC RBC AUTO-ENTMCNC: 31.9 G/DL (ref 31.5–36.5)
MCHC RBC AUTO-ENTMCNC: 32.8 G/DL (ref 31.5–36.5)
MCV RBC AUTO: 91 FL (ref 78–100)
MCV RBC AUTO: 94 FL (ref 78–100)
MONOCYTES # BLD AUTO: 0.6 10E3/UL (ref 0–1.3)
MONOCYTES # BLD AUTO: 0.6 10E3/UL (ref 0–1.3)
MONOCYTES NFR BLD AUTO: 14 %
MONOCYTES NFR BLD AUTO: 14 %
MUCOUS THREADS #/AREA URNS LPF: PRESENT /LPF
NEUTROPHILS # BLD AUTO: 2 10E3/UL (ref 1.6–8.3)
NEUTROPHILS # BLD AUTO: 2 10E3/UL (ref 1.6–8.3)
NEUTROPHILS NFR BLD AUTO: 43 %
NEUTROPHILS NFR BLD AUTO: 44 %
NITRATE UR QL: NEGATIVE
NRBC # BLD AUTO: 0 10E3/UL
NRBC # BLD AUTO: 0 10E3/UL
NRBC BLD AUTO-RTO: 0 /100
NRBC BLD AUTO-RTO: 0 /100
PH UR STRIP: 6.5 [PH] (ref 5–7)
PHOSPHATE SERPL-MCNC: 2.7 MG/DL (ref 2.5–4.5)
PLATELET # BLD AUTO: 306 10E3/UL (ref 150–450)
PLATELET # BLD AUTO: 316 10E3/UL (ref 150–450)
POTASSIUM SERPL-SCNC: 3.6 MMOL/L (ref 3.4–5.3)
POTASSIUM SERPL-SCNC: 3.6 MMOL/L (ref 3.4–5.3)
PROT SERPL-MCNC: 6.1 G/DL (ref 6.4–8.3)
PROT SERPL-MCNC: 6.4 G/DL (ref 6.4–8.3)
PROT/CREAT 24H UR: 0.1 MG/MG CR (ref 0–0.2)
PTH-INTACT SERPL-MCNC: 42 PG/ML (ref 15–65)
RBC # BLD AUTO: 3.54 10E6/UL (ref 4.4–5.9)
RBC # BLD AUTO: 3.6 10E6/UL (ref 4.4–5.9)
RBC URINE: <1 /HPF
SODIUM SERPL-SCNC: 137 MMOL/L (ref 136–145)
SODIUM SERPL-SCNC: 138 MMOL/L (ref 136–145)
SP GR UR STRIP: 1.02 (ref 1–1.03)
TOTAL PROTEIN SERUM FOR ELP: 5.4 G/DL (ref 6.4–8.3)
TOTAL PROTEIN SERUM FOR ELP: 5.9 G/DL (ref 6.4–8.3)
UROBILINOGEN UR STRIP-MCNC: NORMAL MG/DL
WBC # BLD AUTO: 4.4 10E3/UL (ref 4–11)
WBC # BLD AUTO: 4.6 10E3/UL (ref 4–11)
WBC URINE: 1 /HPF

## 2023-06-05 PROCEDURE — 99000 SPECIMEN HANDLING OFFICE-LAB: CPT | Performed by: PATHOLOGY

## 2023-06-05 PROCEDURE — 84165 PROTEIN E-PHORESIS SERUM: CPT | Mod: 26

## 2023-06-05 PROCEDURE — 250N000011 HC RX IP 250 OP 636: Performed by: NURSE PRACTITIONER

## 2023-06-05 PROCEDURE — 36415 COLL VENOUS BLD VENIPUNCTURE: CPT | Performed by: NURSE PRACTITIONER

## 2023-06-05 PROCEDURE — 99205 OFFICE O/P NEW HI 60 MIN: CPT | Performed by: INTERNAL MEDICINE

## 2023-06-05 PROCEDURE — 84450 TRANSFERASE (AST) (SGOT): CPT | Performed by: NURSE PRACTITIONER

## 2023-06-05 PROCEDURE — G0463 HOSPITAL OUTPT CLINIC VISIT: HCPCS | Performed by: NURSE PRACTITIONER

## 2023-06-05 PROCEDURE — 85004 AUTOMATED DIFF WBC COUNT: CPT | Performed by: NURSE PRACTITIONER

## 2023-06-05 PROCEDURE — 82306 VITAMIN D 25 HYDROXY: CPT | Mod: 90 | Performed by: PATHOLOGY

## 2023-06-05 PROCEDURE — 99215 OFFICE O/P EST HI 40 MIN: CPT | Performed by: NURSE PRACTITIONER

## 2023-06-05 PROCEDURE — 84155 ASSAY OF PROTEIN SERUM: CPT | Performed by: NURSE PRACTITIONER

## 2023-06-05 PROCEDURE — 85025 COMPLETE CBC W/AUTO DIFF WBC: CPT | Performed by: PATHOLOGY

## 2023-06-05 PROCEDURE — 83521 IG LIGHT CHAINS FREE EACH: CPT | Performed by: NURSE PRACTITIONER

## 2023-06-05 PROCEDURE — 96401 CHEMO ANTI-NEOPL SQ/IM: CPT

## 2023-06-05 PROCEDURE — 84156 ASSAY OF PROTEIN URINE: CPT | Performed by: PATHOLOGY

## 2023-06-05 PROCEDURE — 36415 COLL VENOUS BLD VENIPUNCTURE: CPT | Performed by: PATHOLOGY

## 2023-06-05 PROCEDURE — 82784 ASSAY IGA/IGD/IGG/IGM EACH: CPT | Performed by: NURSE PRACTITIONER

## 2023-06-05 PROCEDURE — 83521 IG LIGHT CHAINS FREE EACH: CPT | Mod: 90 | Performed by: PATHOLOGY

## 2023-06-05 PROCEDURE — 80053 COMPREHEN METABOLIC PANEL: CPT | Performed by: PATHOLOGY

## 2023-06-05 PROCEDURE — 82784 ASSAY IGA/IGD/IGG/IGM EACH: CPT | Mod: 90 | Performed by: PATHOLOGY

## 2023-06-05 PROCEDURE — 999N000285 HC STATISTIC VASC ACCESS LAB DRAW WITH PIV START

## 2023-06-05 PROCEDURE — 84165 PROTEIN E-PHORESIS SERUM: CPT | Mod: TC | Performed by: PATHOLOGY

## 2023-06-05 PROCEDURE — 84100 ASSAY OF PHOSPHORUS: CPT | Performed by: PATHOLOGY

## 2023-06-05 PROCEDURE — G0463 HOSPITAL OUTPT CLINIC VISIT: HCPCS | Mod: 25,27 | Performed by: INTERNAL MEDICINE

## 2023-06-05 PROCEDURE — 86334 IMMUNOFIX E-PHORESIS SERUM: CPT | Mod: 26

## 2023-06-05 PROCEDURE — 83970 ASSAY OF PARATHORMONE: CPT | Performed by: PATHOLOGY

## 2023-06-05 PROCEDURE — 999N000128 HC STATISTIC PERIPHERAL IV START W/O US GUIDANCE

## 2023-06-05 PROCEDURE — 81001 URINALYSIS AUTO W/SCOPE: CPT | Performed by: PATHOLOGY

## 2023-06-05 PROCEDURE — 86334 IMMUNOFIX E-PHORESIS SERUM: CPT | Performed by: PATHOLOGY

## 2023-06-05 RX ORDER — ALBUTEROL SULFATE 90 UG/1
1-2 AEROSOL, METERED RESPIRATORY (INHALATION)
Status: CANCELLED
Start: 2023-06-05

## 2023-06-05 RX ORDER — HEPARIN SODIUM,PORCINE 10 UNIT/ML
5 VIAL (ML) INTRAVENOUS
Status: CANCELLED | OUTPATIENT
Start: 2023-06-05

## 2023-06-05 RX ORDER — NALOXONE HYDROCHLORIDE 0.4 MG/ML
0.2 INJECTION, SOLUTION INTRAMUSCULAR; INTRAVENOUS; SUBCUTANEOUS
Status: CANCELLED | OUTPATIENT
Start: 2023-06-05

## 2023-06-05 RX ORDER — EPINEPHRINE 1 MG/ML
0.3 INJECTION, SOLUTION INTRAMUSCULAR; SUBCUTANEOUS EVERY 5 MIN PRN
Status: CANCELLED | OUTPATIENT
Start: 2023-06-05

## 2023-06-05 RX ORDER — DIPHENHYDRAMINE HCL 25 MG
50 CAPSULE ORAL ONCE
Status: CANCELLED
Start: 2023-06-05 | End: 2023-06-12

## 2023-06-05 RX ORDER — AMLODIPINE BESYLATE 2.5 MG/1
2.5 TABLET ORAL DAILY
Qty: 30 TABLET | Refills: 3 | Status: SHIPPED | OUTPATIENT
Start: 2023-06-05 | End: 2023-06-23

## 2023-06-05 RX ORDER — ALBUTEROL SULFATE 0.83 MG/ML
2.5 SOLUTION RESPIRATORY (INHALATION)
Status: CANCELLED | OUTPATIENT
Start: 2023-06-05

## 2023-06-05 RX ORDER — HEPARIN SODIUM (PORCINE) LOCK FLUSH IV SOLN 100 UNIT/ML 100 UNIT/ML
5 SOLUTION INTRAVENOUS
Status: CANCELLED | OUTPATIENT
Start: 2023-06-05

## 2023-06-05 RX ORDER — MEPERIDINE HYDROCHLORIDE 25 MG/ML
25 INJECTION INTRAMUSCULAR; INTRAVENOUS; SUBCUTANEOUS EVERY 30 MIN PRN
Status: CANCELLED | OUTPATIENT
Start: 2023-06-05

## 2023-06-05 RX ORDER — ACYCLOVIR 400 MG/1
800 TABLET ORAL 2 TIMES DAILY
Qty: 60 TABLET | Refills: 3 | Status: SHIPPED | OUTPATIENT
Start: 2023-06-05 | End: 2023-06-26

## 2023-06-05 RX ORDER — DIPHENHYDRAMINE HYDROCHLORIDE 50 MG/ML
50 INJECTION INTRAMUSCULAR; INTRAVENOUS
Status: CANCELLED
Start: 2023-06-05

## 2023-06-05 RX ORDER — DEXAMETHASONE 4 MG/1
12 TABLET ORAL ONCE
Status: CANCELLED
Start: 2023-06-05 | End: 2023-06-12

## 2023-06-05 RX ORDER — METHYLPREDNISOLONE SODIUM SUCCINATE 125 MG/2ML
125 INJECTION, POWDER, LYOPHILIZED, FOR SOLUTION INTRAMUSCULAR; INTRAVENOUS
Status: CANCELLED
Start: 2023-06-05

## 2023-06-05 RX ORDER — ACETAMINOPHEN 325 MG/1
650 TABLET ORAL ONCE
Status: CANCELLED
Start: 2023-06-05 | End: 2023-06-12

## 2023-06-05 RX ADMIN — DARATUMUMAB AND HYALURONIDASE-FIHJ (HUMAN RECOMBINANT) 1800 MG: 1800; 30000 INJECTION SUBCUTANEOUS at 14:00

## 2023-06-05 ASSESSMENT — PAIN SCALES - GENERAL
PAINLEVEL: NO PAIN (0)
PAINLEVEL: NO PAIN (0)

## 2023-06-05 NOTE — PROGRESS NOTES
Nephrology Clinic    June Ronquillo MRN:[id number] YOB: 1970  Date of Service: 06/05/2023  Primary care provider: Selma Johnson  Requesting physician: Laura Ballesteros APRN CNP      REASON FOR CONSULT: Acute kidney Injury    HISTORY OF PRESENT ILLNESS:   June Ronquillo is a 52 year old male who presents for evaluation of acute kidney injury.  The past medical history is significant for hypertension diagnosed 5 years ago, hypothyroidism s/p thyroidectomy on levothyroxine and multiple myeloma diagnosed in April 2021.    The history of his myeloma is the following:  - 4/30/2021 M spike of 34.8 in urine.M spike in blood 0.2; IgG 702 with depressed IgA and IgM. Beta 2 microglobulin 2.7. Lambda  with K/L ratio of 0.01. WBC 11.1 with absolute eos of 1.0. hgb, plt, Cr (1.1), and albumin WNL.     -4/30/2021 CT abd/pelvis showed sclerotic marrow changes with numerous lytic appearing lesions throughout the imaged portions of the spine, pelvis and ribs.      -PET scan 5/11/2021 Numerous osteolytic lesions which predominantly demonstrate uptake below liver background levels. There is one intraosseous lesion in the left lateral 9th rib that demonstrates uptake above background, possibly due to nondisplaced fracture. Adjacent to this lesion is mild  hypermetabolism to the left pleura, with new small left pleural effusion, suspicious for malignant effusion versus posttraumatic effusion. Pleural uptake may represent extramedullary myeloma extending along the intercostal space versus inflammation.     - BM bx 5/17/2021 with flow showing 4.0% plasma cells which express CD19 (dim), CD38 (bright), CD45 (dim), , and monotypic cytoplasmic lambda immunoglobulin light chains but lack CD20 and CD56.  Hypercellular bone marrow for age (approximately 75% cellularity) with adequate trilineage hematopoiesis. Plasma cell infiltrate: Interstitial, lambda monotypic and representing approximately 60% the  bone marrow cellularity, by  immunohistochemical stain. Cytpgenetics with 2 related hypodiploid clones. One with loss of Y, monosomy 13 and 14 (5%) and one with translocation of 8p and 14, derivative 16 with long arm replaced by extra copy 1q,monosomy 21. FISH showed gain of 1q, loss of 13 and 14, IGH-MYC fusion t(8:14)       - Started Miracle-RVd 21 day with velcade on days 1,8,15.        - 11/11/2021 Autologous BMT    Given his high risk cytogenetics he started miracle + revlimid maintenance therapy 2/21/22. He has also been receiving zoledronic acid infusions dosed at 3 mg on a monthly basis. He last received zoledronic acid and daratumumab on 5/08.       He presented on 4/19 with fever, rigors, chest pain, and shortness of breath.Found to have RLL pneumonia c/b some right sided back pain thought to be 2/2 diaphragmatic irritation. Discharged on 7d course of Levaquin. He was concomitantly found to have AZUCENA with a creatinine level that went up to 1.27 mg/dL from a baseline at 1. It was 1.36 upon discharge and 1.47 mg/dL on 4/28. It had improved to 1.13 mg/dL on 5/08 when he received zoledronic acid and daratumumab and is 1.47 mg/dL at the time of this visit. The UPCR is       Was on acyclovir daily for viral ppx and bactrim M/T for PJP ppx and they were held when he developed AZUCENA. Acyclovir was restarted but not bactrim.  Continues on PO revlimid daily and losartan 50 mg daily. He doesn't measure his BP at home. It is 127/77 in clinic today.    Renal imaging  The patient denies any dysuria, any pollakiuria, any LE edema, any dyspnea on exertion. Reports new onset nocturia. Reports adequate PO intake.    The following portions of the patient's history were reviewed and updated as appropriate: allergies, current medications, past family history, past medical history, past social history, past surgical history and problem list.    PAST MEDICAL HISTORY:  Past Medical History:   Diagnosis Date     CAD (coronary artery  disease)     s/p stent to CFX     Heart attack (H)      Hyperlipidemia LDL goal <100      Hypertension      Stented coronary artery      Thyroid disease      PAST SURGICAL HISTORY:  Past Surgical History:   Procedure Laterality Date     BONE MARROW BIOPSY, BONE SPECIMEN, NEEDLE/TROCAR Left 5/17/2021    Procedure: BIOPSY, BONE MARROW with aspiration and ancillary studies;  Surgeon: Niharika Regalado MD;  Location: [facility] OR     BONE MARROW BIOPSY, BONE SPECIMEN, NEEDLE/TROCAR Left 10/14/2021    Procedure: BIOPSY, BONE MARROW;  Surgeon: Alexa Albert APRN CNP;  Location: [facility] OR     BONE MARROW BIOPSY, BONE SPECIMEN, NEEDLE/TROCAR Right 3/7/2022    Procedure: BIOPSY, BONE MARROW;  Surgeon: Deborah Carmona PA-C;  Location: [facility] OR     BONE MARROW BIOPSY, BONE SPECIMEN, NEEDLE/TROCAR N/A 5/9/2022    Procedure: BIOPSY, BONE MARROW;  Surgeon: Deborah Leblanc PA-C;  Location: [facility] OR     BONE MARROW BIOPSY, BONE SPECIMEN, NEEDLE/TROCAR Left 12/5/2022    Procedure: BIOPSY, BONE MARROW;  Surgeon: Alba Moses APRN CNP;  Location: [facility] OR     HEART CATH PTCA SINGLE VESSEL  10/10/2004    Stent to X - [facility]      INSERT CATHETER VASCULAR ACCESS Right 11/3/2021    Procedure: NON VALVED TUNNELED DUAL LUMEN APHARESIS CAPABLE LINE INSERTION, VASCULAR ACCESS CATHETER;  Surgeon: Werner Mcnamara MD;  Location: [facility] OR     IR CVC TUNNEL PLACEMENT > 5 YRS OF AGE  11/3/2021     IR CVC TUNNEL REMOVAL RIGHT  12/2/2021     THYROIDECTOMY N/A 5/3/2022    Procedure: total thyroidectomy, Left selective neck dissection level 6 and level 7 ;  Surgeon: Clarita Sepulveda MD;  Location: [facility] OR     MEDICATIONS:  Prescription Medications as of 6/5/2023       Rx Number Disp Refills Start End Last Dispensed Date Next Fill Date Owning Pharmacy    acetaminophen (TYLENOL) 500 MG tablet            Sig: Take 500-1,000 mg by mouth every 6 hours as needed for mild pain    Class: Historical    Route: Oral    acyclovir  (ZOVIRAX) 800 MG tablet  180 tablet 1 4/21/2023    [address]    Sig: Take 1 tablet (800 mg) by mouth 2 times daily    Class: E-Prescribe    Notes to Pharmacy: HOLD until outpatient follow up    Route: Oral    aspirin (ASA) 81 MG EC tablet    12/9/2021        Sig: Take 1 tablet (81 mg) by mouth daily    Class: Historical    Route: Oral    baclofen (LIORESAL) 10 MG tablet  30 tablet 0 4/20/2023    [address]    Sig: Take 1 tablet (10 mg) by mouth 3 times daily as needed for muscle spasms    Class: E-Prescribe    Route: Oral    calcium carbonate (TUMS) 500 MG chewable tablet  90 tablet 1 5/3/2022    [address]    Sig: Take 2 tablets (1,000 mg) by mouth 3 times daily    Class: E-Prescribe    Route: Oral    calcium carbonate-vitamin D (OSCAL) 500-5 MG-MCG tablet  90 tablet 3 5/22/2023    [place] [address]    Sig: TAKE ONE TABLET BY MOUTH ONCE DAILY    Class: E-Prescribe    LENalidomide (REVLIMID) 10 MG CAPS capsule  28 capsule 0 5/8/2023    [facility] - [place], [place] - [address]    Sig: Take 1 capsule (10 mg) by mouth daily    Class: E-Prescribe    Notes to Pharmacy: Auth#[id number]    Route: Oral    LENalidomide (REVLIMID) 10 MG CAPS capsule  28 capsule 0 4/20/2023    [address]    Sig: Take 1 capsule (10 mg) by mouth daily HOLD until follow up in oncology clinic    Class: E-Prescribe    Route: Oral    levothyroxine (SYNTHROID/LEVOTHROID) 125 MCG tablet  90 tablet 2 1/9/2023    [place] [address]    Sig: Take 1 tablet (125 mcg) by mouth daily    Class: E-Prescribe    Route: Oral    Lidocaine (LIDOCARE) 4 % Patch  30 patch 0 4/21/2023    [facility]  [place], [place] - [address] [place] [address]    Sig: Place 2 patches onto the skin every 24 hours To prevent lidocaine toxicity, patient should be patch free for 12 hrs daily.    Class: E-Prescribe    Route: Transdermal    losartan (COZAAR) 50 MG tablet  90 tablet 3 8/16/2022    [facility] [address]    Sig: Take 1 tablet (50 mg) by mouth daily    Class: E-Prescribe    Route: Oral    potassium chloride ER (KLOR-CON M) 10 MEQ CR tablet  60 tablet 3 1/16/2023    [place] [address]    Sig: Take 2 tablets (20 mEq) by mouth daily    Class: E-Prescribe    Route: Oral    rosuvastatin (CRESTOR) 40 MG tablet  90 tablet 3 9/13/2022    [place] [address]    Sig: Take 1 tablet (40 mg) by mouth daily    Class: E-Prescribe    Route: Oral    vitamin B complex with vitamin C (STRESS TAB) tablet  90 tablet 3 5/16/2023    [facility] [address]    Sig: Take 1 tablet by mouth daily    Class: E-Prescribe    Route: Oral         ALLERGIES:    Allergies   Allergen Reactions     Blood Transfusion Related (Informational Only) Other (See Comments)     Patient has a history of a clinically significant antibody against RBC antigens.  A delay in compatible RBCs may occur.      REVIEW OF SYSTEMS:  Review Of Systems  Skin: negative for, pigmentation, acne, rash, scaling, itching, bruising  Eyes: negative for, visual blurring, double vision, glaucoma, cataracts, eye pain, color blindness, glasses  Ears/Nose/Throat: negative for, nasal congestion, purulent rhinorrhea, sneezing, postnasal drainage, hearing loss, deafness, tinnitus, vertigo  Respiratory: No shortness of breath, dyspnea on exertion, cough, or hemoptysis  Cardiovascular: negative for, palpitations, tachycardia, irregular heart beat, chest pain, exertional chest pain or pressure and paroxysmal nocturnal  dyspnea  Gastrointestinal: negative for, poor appetite, dysphagia, nausea, vomiting, heartburn, dyspepsia, reflux and hematemesis  Genitourinary: negative for, nocturia, dysuria, frequency, urgency, hesitancy, hematuria, retention and decreased urinary stream  Musculoskeletal: negative for, fracture, back pain, neck pain, arthritis, joint pain, joint swelling and joint stiffness  Neurologic: negative for, headaches, syncope, stroke, seizures, paralysis, local weakness, numbness or tingling of hands and numbness or tingling of feet    A comprehensive review of systems was performed and found to be negative except as described here or above.  SOCIAL HISTORY:   Social History     Socioeconomic History     Marital status:      Spouse name: Not on file     Number of children: Not on file     Years of education: Not on file     Highest education level: Not on file   Occupational History     Not on file   Tobacco Use     Smoking status: Never     Smokeless tobacco: Never   Vaping Use     Vaping status: Not on file   Substance and Sexual Activity     Alcohol use: Not Currently     Drug use: No     Sexual activity: Yes     Partners: Female   Other Topics Concern     Parent/sibling w/ CABG, MI or angioplasty before 65F 55M? No      Service Not Asked     Blood Transfusions Not Asked     Caffeine Concern No     Occupational Exposure Not Asked     Hobby Hazards Not Asked     Sleep Concern Not Asked     Stress Concern Not Asked     Weight Concern Not Asked     Special Diet No     Back Care Not Asked     Exercise No     Bike Helmet Not Asked     Seat Belt Yes     Self-Exams Not Asked   Social History Narrative     Not on file     Social Determinants of Health     Financial Resource Strain: Not on file   Food Insecurity: Not on file   Transportation Needs: Not on file   Physical Activity: Not on file   Stress: Not on file   Social Connections: Not on file   Intimate Partner Violence: Not At Risk (12/8/2022)     Humiliation, Afraid, Rape, and Kick questionnaire      Fear of Current or Ex-Partner: No      Emotionally Abused: No      Physically Abused: No      Sexually Abused: No   Housing Stability: Not on file     FAMILY MEDICAL HISTORY:   Family History   Problem Relation Age of Onset     Hypertension Mother      Hyperlipidemia Mother      Heart Disease Paternal Grandmother      Hyperlipidemia Sister      Hyperlipidemia Sister      PHYSICAL EXAM:   There were no vitals taken for this visit.  GENERAL APPEARANCE: alert and no distress  EYES: nonicteric  HENT: mouth without ulcers or lesions  NECK: supple, no adenopathy  RESP: lungs clear to auscultation   CV: regular rhythm, normal rate, no rub  ABDOMEN: soft, nontender, normal bowel sounds, no HSM   Extremities: no clubbing, cyanosis, or edema  MS: no evidence of inflammation in joints, no muscle tenderness  SKIN: no rash  NEURO: mentation intact and speech normal  PSYCH: affect normal/bright   LABS:   Recent Results (from the past 672 hour(s))   Protein Immunofixation Serum    Collection Time: 05/08/23 12:47 PM   Result Value Ref Range    Immunofixation ELP       Possible small monoclonal IgG immunoglobulin of kappa light chain type. Monoclonal antibody therapeutics (e.g. Daratumumab) may appear as monoclonal proteins on serum electrophoresis and immunofixation. Results should be interpreted with caution. Pathologic significance requires clinical correlation.  Nunu Barber M.D., Ph.D.   Immunoglobulins A G and M    Collection Time: 05/08/23 12:47 PM   Result Value Ref Range    Immunoglobulin G 417 (L) 610 - 1,616 mg/dL    Immunoglobulin A 65 (L) 84 - 499 mg/dL    Immunoglobulin M 38 35 - 242 mg/dL   Kappa and lambda light chain    Collection Time: 05/08/23 12:47 PM   Result Value Ref Range    Kappa Free Light Chains 0.78 0.33 - 1.94 mg/dL    Lambda Free Light Chains 0.49 (L) 0.57 - 2.63 mg/dL    Kappa /Lambda Ratio 1.59 0.26 - 1.65   Comprehensive metabolic panel     Collection Time: 05/08/23 12:47 PM   Result Value Ref Range    Sodium 141 136 - 145 mmol/L    Potassium 4.0 3.4 - 5.3 mmol/L    Chloride 108 (H) 98 - 107 mmol/L    Carbon Dioxide (CO2) 23 22 - 29 mmol/L    Anion Gap 10 7 - 15 mmol/L    Urea Nitrogen 13.7 6.0 - 20.0 mg/dL    Creatinine 1.13 0.67 - 1.17 mg/dL    Calcium 8.9 8.6 - 10.0 mg/dL    Glucose 135 (H) 70 - 99 mg/dL    Alkaline Phosphatase 75 40 - 129 U/L    AST 27 10 - 50 U/L    ALT 15 10 - 50 U/L    Protein Total 6.5 6.4 - 8.3 g/dL    Albumin 4.3 3.5 - 5.2 g/dL    Bilirubin Total 0.2 <=1.2 mg/dL    GFR Estimate 77 >60 mL/min/1.73m2   Total Protein, Serum for ELP    Collection Time: 05/08/23 12:47 PM   Result Value Ref Range    Total Protein Serum for ELP 6.2 (L) 6.4 - 8.3 g/dL   Protein Electrophoresis, Serum    Collection Time: 05/08/23 12:47 PM   Result Value Ref Range    Albumin 4.0 3.7 - 5.1 g/dL    Alpha 1 0.3 0.2 - 0.4 g/dL    Alpha 2 0.7 0.5 - 0.9 g/dL    Beta Globulin 0.8 0.6 - 1.0 g/dL    Gamma Globulin 0.4 (L) 0.7 - 1.6 g/dL    Monoclonal Peak 0.0 <=0.0 g/dL    ELP Interpretation       No monoclonal protein seen by capillary electrophoresis, however a possible small monoclonal protein was seen in this sample by immunofixation which is a more sensitive method for monoclonal detection. Hypogammaglobulinemia. Pathologic significance requires clinical correlation. Nunu Barber M.D., Ph.D.   CBC with platelets and differential    Collection Time: 05/08/23 12:47 PM   Result Value Ref Range    WBC Count 6.1 4.0 - 11.0 10e3/uL    RBC Count 3.81 (L) 4.40 - 5.90 10e6/uL    Hemoglobin 11.0 (L) 13.3 - 17.7 g/dL    Hematocrit 34.2 (L) 40.0 - 53.0 %    MCV 90 78 - 100 fL    MCH 28.9 26.5 - 33.0 pg    MCHC 32.2 31.5 - 36.5 g/dL    RDW 17.2 (H) 10.0 - 15.0 %    Platelet Count 404 150 - 450 10e3/uL    % Neutrophils 46 %    % Lymphocytes 25 %    % Monocytes 10 %    % Eosinophils 18 %    % Basophils 1 %    % Immature Granulocytes 0 %    NRBCs per 100 WBC 0 <1 /100     Absolute Neutrophils 2.9 1.6 - 8.3 10e3/uL    Absolute Lymphocytes 1.5 0.8 - 5.3 10e3/uL    Absolute Monocytes 0.6 0.0 - 1.3 10e3/uL    Absolute Eosinophils 1.1 (H) 0.0 - 0.7 10e3/uL    Absolute Basophils 0.0 0.0 - 0.2 10e3/uL    Absolute Immature Granulocytes 0.0 <=0.4 10e3/uL    Absolute NRBCs 0.0 10e3/uL   CBC with platelets and differential    Collection Time: 06/05/23  7:15 AM   Result Value Ref Range    WBC Count 4.4 4.0 - 11.0 10e3/uL    RBC Count 3.54 (L) 4.40 - 5.90 10e6/uL    Hemoglobin 10.6 (L) 13.3 - 17.7 g/dL    Hematocrit 33.2 (L) 40.0 - 53.0 %    MCV 94 78 - 100 fL    MCH 29.9 26.5 - 33.0 pg    MCHC 31.9 31.5 - 36.5 g/dL    RDW 17.2 (H) 10.0 - 15.0 %    Platelet Count 306 150 - 450 10e3/uL    % Neutrophils 44 %    % Lymphocytes 34 %    % Monocytes 14 %    % Eosinophils 7 %    % Basophils 1 %    % Immature Granulocytes 0 %    NRBCs per 100 WBC 0 <1 /100    Absolute Neutrophils 2.0 1.6 - 8.3 10e3/uL    Absolute Lymphocytes 1.5 0.8 - 5.3 10e3/uL    Absolute Monocytes 0.6 0.0 - 1.3 10e3/uL    Absolute Eosinophils 0.3 0.0 - 0.7 10e3/uL    Absolute Basophils 0.0 0.0 - 0.2 10e3/uL    Absolute Immature Granulocytes 0.0 <=0.4 10e3/uL    Absolute NRBCs 0.0 10e3/uL   UA with Microscopic    Collection Time: 06/05/23  7:18 AM   Result Value Ref Range    Color Urine Light Yellow Colorless, Straw, Light Yellow, Yellow    Appearance Urine Clear Clear    Glucose Urine Negative Negative mg/dL    Bilirubin Urine Negative Negative    Ketones Urine Negative Negative mg/dL    Specific Gravity Urine 1.019 1.003 - 1.035    Blood Urine Negative Negative    pH Urine 6.5 5.0 - 7.0    Protein Albumin Urine 10 (A) Negative mg/dL    Urobilinogen Urine Normal Normal, 2.0 mg/dL    Nitrite Urine Negative Negative    Leukocyte Esterase Urine Negative Negative    Mucus Urine Present (A) None Seen /LPF    RBC Urine <1 <=2 /HPF    WBC Urine 1 <=5 /HPF     CMP  Recent Labs   Lab Test 05/08/23  1247 05/01/23  1703 04/28/23  1456  04/21/23  0617 04/20/23  0939 04/19/23  2227 04/19/23  1827 12/05/22  1144 11/10/22  1310 07/05/22  0922 06/17/22  1332 05/19/22  1352 05/09/22  0928 03/24/22  1503 03/07/22  0834 07/16/21  1442 07/09/21  1440 07/01/21  1441 06/25/21  1452 06/18/21  1444    142 137 138  --    < > 136   < > 137   < > 139   < > 140   < > 141   < > 137 136 135 137   POTASSIUM 4.0 4.8 3.9 3.5  3.3*  --    < > 3.4   < > 3.6   < > 3.5   < > 4.0   < > 3.6   < > 3.7 3.2* 3.2* 3.3*   CHLORIDE 108* 110* 104 108*  --    < > 105   < > 104   < > 108   < > 106   < > 109   < > 107 103 101 104   CO2 23 23 22 18*  --    < > 19*   < > 21*   < > 22   < > 26   < > 22   < > 24 23 24 24   ANIONGAP 10 9 11 12  --    < > 12   < > 12   < > 9   < > 8   < > 10   < > 5 9 10 9   * 106* 112* 92  --    < > 119*   < > 145*   < > 127*   < > 106*   < > 106*   < > 91 131* 123* 116*   BUN 13.7 11.2 22.3* 10.5  --    < > 10.5   < > 16.4   < > 17   < > 13   < > 17   < > 8 15 10 14   CR 1.13 1.16 1.47* 1.36*  --    < > 1.24*   < > 1.30*   < > 0.97   < > 0.99   < > 0.95   < > 0.87 1.21 0.96 0.89   GFRESTIMATED 77 75 56* 62  --    < > 69   < > 65   < > >90   < > >90   < > >90   < > >90 68 >90 >90   GFRESTBLACK  --   --   --   --   --   --   --   --   --   --   --   --   --   --   --   --  >90 79 >90 >90   HARDEEP 8.9 8.8 9.9 8.2*  --    < > 8.4*   < > 9.1   < > 9.5   < > 9.3   < > 8.8   < > 8.4* 8.2* 8.2* 8.2*   MAG  --   --  2.6* 1.7 1.9  --   --   --  1.9  --   --   --  1.8  --  1.9   < >  --   --   --   --    PHOS  --   --   --   --   --   --   --   --  2.6  --  3.6  --  3.0  --  3.2   < >  --   --   --   --    PROTTOTAL 6.5 6.8 6.8  --   --   --  6.3*   < > 6.5   < > 6.4*   < > 6.5*   < > 6.2*   < > 6.1* 6.0* 6.5* 6.6*   ALBUMIN 4.3 4.3 4.2  --   --   --  3.8   < > 4.5   < > 4.0   < > 3.8   < > 3.8   < > 3.2* 3.0* 3.1* 3.3*   BILITOTAL 0.2 0.2 0.2  --   --   --  0.5   < > 0.4   < > 0.5   < > 0.4   < > 0.3   < > 0.4 0.3 0.4 0.4   ALKPHOS 75 78 82  --   --   --   68   < > 53   < > 54   < > 54   < > 50   < > 76 91 103 103   AST 27 22 20  --   --   --  23   < > 24   < > 20   < > 26   < > 16   < > 23 32 25 20   ALT 15 13 14  --   --   --  16   < > 12   < > 26   < > 27   < > 23   < > 25 42 36 30    < > = values in this interval not displayed.     CBC  Recent Labs   Lab Test 06/05/23  0715 05/08/23  1247 05/01/23  1703 04/28/23  1456   HGB 10.6* 11.0* 11.3* 11.2*   WBC 4.4 6.1 6.2 6.5   RBC 3.54* 3.81* 3.86* 3.90*   HCT 33.2* 34.2* 36.0* 35.2*   MCV 94 90 93 90   MCH 29.9 28.9 29.3 28.7   MCHC 31.9 32.2 31.4* 31.8   RDW 17.2* 17.2* 17.1* 16.9*    404 507* 511*     INR  Recent Labs   Lab Test 12/05/22  1144 05/09/22  0928 03/07/22  0834 11/23/21  0415 11/22/21  1012 11/22/21  0414 11/21/21  0422 11/14/21  1103 11/10/21  0842 10/14/21  0943 10/13/21  1354   INR 0.98 0.89 0.96 1.16*  --    < > 1.23*   < > 0.95   < > 1.03   PTT  --   --   --   --  41*  --  46*  --  28  --  31    < > = values in this interval not displayed.     ABGNo lab results found.   URINE STUDIES  Recent Labs   Lab Test 06/05/23  0718 04/28/23  1600 04/19/23  1439 11/20/21  0900 10/13/21  1333 04/30/21  1023   COLOR Light Yellow Yellow Yellow Straw   < > Yellow   APPEARANCE Clear Clear Clear Clear   < > Clear   URINEGLC Negative Negative Negative Negative   < > Negative   URINEBILI Negative Negative Negative Negative   < > Negative   URINEKETONE Negative Negative Negative Negative   < > Negative   SG 1.019 1.014 1.017 1.008   < > 1.015   UBLD Negative Negative Negative Negative   < > Trace*   URINEPH 6.5 5.5 7.5* 7.0   < > 7.0   PROTEIN 10* Negative 50* Negative   < > Trace*   UROBILINOGEN  --   --   --   --   --  0.2   NITRITE Negative Negative Negative Negative   < > Negative   LEUKEST Negative Negative Negative Negative   < > Negative   RBCU <1 1 2 1   < > O - 2   WBCU 1 1 5 1   < > 0 - 5    < > = values in this interval not displayed.     Recent Labs   Lab Test 05/09/22  0900 10/16/21  0525   [facility]  0.11 0.06       ASSESSMENT AND PLAN:   #AZUCENA possibly secondary to prerenal azotemia with hemodynamic changes induced by losartan. Acyclovir toxicity is also a concern. Renal imaging done on 4/28 is unremarkable. The UPCR is 0.1 mg/mg Cr. Will stop the losartan and replace it with amlodipine 2.5 mg daily. Will also decrease acyclovir to 400 mg twice daily. The patient was asked to keep a BP diary and to communicate the results at next visit. I will also recheck a CMP in 2 weeks     #HTN  Primary and secondary to CKD. Management as per above.     #Blood count  Hemoglobin  10.8 management per oncology    #Acid-base status  CO2 level 23 no acute issue    #Electrolytes  Na 137  K 3.6     #BMD  Ca  8.6        P 2.7  Alb 4.3  Vit D level 34 no need for any supplementation      The total time of this encounter amounted to 60 minutes on the day of the encounter. This time included time spent with the patient, reviewing records, ordering tests, and performing post visit documentation.   Labs ordered include: CBC with diff, Renal Panel, CMP, UA with microscopy, UPCR, UACR, vitamin D levels    The patient will return to follow up in 2 weeks    Yuliya Leiva MD  Division of Renal Disease and Hypertension  June 5, 2023  7:31 AM

## 2023-06-05 NOTE — NURSING NOTE
"Oncology Rooming Note    June 5, 2023 12:09 PM   June Ronquillo is a 52 year old male who presents for:    Chief Complaint   Patient presents with     Blood Draw     Labs drawn with piv start by rn.  VS taken.     Oncology Clinic Visit     Multiple myeloma not having achieved remission      Initial Vitals: /72 (BP Location: Right arm, Patient Position: Sitting, Cuff Size: Adult Regular)   Pulse 66   Temp 98.3  F (36.8  C) (Oral)   Resp 16   Wt 71.1 kg (156 lb 12.8 oz)   SpO2 100%   BMI 25.32 kg/m   Estimated body mass index is 25.32 kg/m  as calculated from the following:    Height as of an earlier encounter on 6/5/23: 1.676 m (5' 5.98\").    Weight as of this encounter: 71.1 kg (156 lb 12.8 oz). Body surface area is 1.82 meters squared.  No Pain (0) Comment: Data Unavailable   No LMP for male patient.  Allergies reviewed: Yes  Medications reviewed: Yes    Medications: Medication refills not needed today.  Pharmacy name entered into [employer]:    [facility] - [place], [place] - [address]  [facility] - [place], [place] - [address] [facility] [place], [place] - [phone]  [facility] [place], [place] - [address] [facility]-    Clinical concerns: NONE      Candice Ruano"

## 2023-06-05 NOTE — NURSING NOTE
"Chief Complaint   Patient presents with     Consult     Elevated creatinine      Vital signs:  Temp: 97.9  F (36.6  C) Temp src: Oral BP: 127/77 Pulse: 51   Resp: 20 SpO2: 100 %     Height: 167.6 cm (5' 5.98\") Weight: 70.9 kg (156 lb 4.8 oz)  Estimated body mass index is 25.24 kg/m  as calculated from the following:    Height as of this encounter: 1.676 m (5' 5.98\").    Weight as of this encounter: 70.9 kg (156 lb 4.8 oz).        Kendal Patel, [facility]  6/5/2023 7:50 AM      "

## 2023-06-05 NOTE — LETTER
6/5/2023         RE: June Ronquillo  [address]  [facility] [address]        Dear Colleague,    Thank you for referring your patient, June Ronquillo, to the [facility]. Please see a copy of my visit note below.    Jun 5, 2023  Oncologic Hx:   - 4/30/2021 M spike of 34.8 in urine.M spike in blood 0.2; IgG 702 with depressed IgA and IgM. Beta 2 microglobulin 2.7. Lambda  with K/L ratio of 0.01. WBC 11.1 with absolute eos of 1.0. hgb, plt, Cr (1.1), and albumin WNL.    -4/30/2021 CT abd/pelvis showed sclerotic marrow changes with numerous lytic appearing lesions throughout the imaged portions of the spine, pelvis and ribs.      -PET scan 5/11/2021 Numerous osteolytic lesions which predominantly demonstrate uptake below liver background levels. There is one intraosseous lesion in the left lateral 9th rib that demonstrates uptake above background, possibly due to nondisplaced fracture. Adjacent to this lesion is mild  hypermetabolism to the left pleura, with new small left pleural effusion, suspicious for malignant effusion versus posttraumatic effusion. Pleural uptake may represent extramedullary myeloma extending along the intercostal space versus inflammation.    - BM bx 5/17/2021 with flow showing 4.0% plasma cells which express CD19 (dim), CD38 (bright), CD45 (dim), , and monotypic cytoplasmic lambda immunoglobulin light chains but lack CD20 and CD56.  Hypercellular bone marrow for age (approximately 75% cellularity) with adequate trilineage hematopoiesis. Plasma cell infiltrate: Interstitial, lambda monotypic and representing approximately 60% the bone marrow cellularity, by  immunohistochemical stain. Cytpgenetics with 2 related hypodiploid clones. One with loss of Y, monosomy 13 and 14 (5%) and one with translocation of 8p and 14, derivative 16 with long arm replaced by extra copy 1q,monosomy 21. FISH showed gain of 1q, loss of 13 and 14,  IGH-MYC fusion t(8:14)    - Started Miracle-RVd 21 day with velcade on days 1,8,15.     -Cycle 2 Miracle-RVd. Dose reduce dex to 80 mg d/t fatigue and stomach upset on dex days. Also having cough with basilar streaking on CXR  cou- 8/31/2021 BM bx -rare suspicious plasma cells in touch imprint. No increase in plasma cells by morph.    -8/31/2021 PET/CT Diffuse osteolytic lesions throughout the axial and appendicular skeleton. Increased sclerosis since prior exam 5/11/2021 without increased metabolism or size.  Hypermetabolic pretracheal lymph node as well as hypermetabolic level 2 lymph nodes within the right neck. These lymph nodes are likely reactive from autoimmune activation via medical therapy. Hypermetabolic subcentimeter nodules within the thyroid gland    - 11/11/2021 Autologous BMT    - 2/21/22 started maintenance with Revlimid 10 mg daily and daratumumab monthly; did Revlimid alone for C1 due to low IgG levels, added daratumumab starting C2      Interval Hx:   June is seen in clinic today for routine follow-up.  He has been feeling well, his energy levels are improving.  He feels he is fully recovered from RLL pneumonia. Ear pain has also resolved.  Denies fevers, chills, cough, SOB, N/V/D/C. He has some stable neuropathy in his R upper extremity and L lower extremity, drenching night sweats, unexplained weight loss.     14 comprehensive ROS neg    Physical Exam:  /72 (BP Location: Right arm, Patient Position: Sitting, Cuff Size: Adult Regular)   Pulse 66   Temp 98.3  F (36.8  C) (Oral)   Resp 16   Wt 71.1 kg (156 lb 12.8 oz)   SpO2 100%   BMI 25.32 kg/m    General: No acute distress  HEENT: Sclera anicteric. Oral exam deferred  Lymph: No lymphadenopathy in neck  Heart: Regular, rate, and rhythm  Lungs: Wheezes  to ascultation bilaterally in lower lobes  Extremities: no lower extremity edema  Neuro: Cranial nerves grossly intact  Rash: none on exposed skin     Latest Reference Range & Units  06/05/23 07:15 06/05/23 07:18 06/05/23 11:57   Sodium 136 - 145 mmol/L 138  137   Potassium 3.4 - 5.3 mmol/L 3.6  3.6   Chloride 98 - 107 mmol/L 107  105   Carbon Dioxide (CO2) 22 - 29 mmol/L 24  23   Urea Nitrogen 6.0 - 20.0 mg/dL 17.3  16.2   Creatinine 0.67 - 1.17 mg/dL 1.47 (H)  1.36 (H)   GFR Estimate >60 mL/min/1.73m2 56 (L)  62   Calcium 8.6 - 10.0 mg/dL 8.7  8.6   Anion Gap 7 - 15 mmol/L 7  9   Phosphorus 2.5 - 4.5 mg/dL 2.7     Albumin 3.5 - 5.2 g/dL 4.1  4.3   Protein Total 6.4 - 8.3 g/dL 6.1 (L)  6.4   Alkaline Phosphatase 40 - 129 U/L 52  56   ALT 10 - 50 U/L 17  17   AST 10 - 50 U/L 26  33   Bilirubin Total <=1.2 mg/dL 0.4  0.5   Creatinine Urine mg/dL  119.0    Glucose 70 - 99 mg/dL 100 (H)  112 (H)   WBC 4.0 - 11.0 10e3/uL 4.4  4.6   Hemoglobin 13.3 - 17.7 g/dL 10.6 (L)  10.8 (L)   Hematocrit 40.0 - 53.0 % 33.2 (L)  32.9 (L)   Platelet Count 150 - 450 10e3/uL 306  316   RBC Count 4.40 - 5.90 10e6/uL 3.54 (L)  3.60 (L)   MCV 78 - 100 fL 94  91   MCH 26.5 - 33.0 pg 29.9  30.0   MCHC 31.5 - 36.5 g/dL 31.9  32.8   RDW 10.0 - 15.0 % 17.2 (H)  17.2 (H)   % Neutrophils % 44  43   % Lymphocytes % 34  37   % Monocytes % 14  14   % Eosinophils % 7  5   % Basophils % 1  1   Absolute Basophils 0.0 - 0.2 10e3/uL 0.0  0.0   Absolute Eosinophils 0.0 - 0.7 10e3/uL 0.3  0.2   Absolute Immature Granulocytes <=0.4 10e3/uL 0.0  0.0   Absolute Lymphocytes 0.8 - 5.3 10e3/uL 1.5  1.7   Absolute Monocytes 0.0 - 1.3 10e3/uL 0.6  0.6   % Immature Granulocytes % 0  0   Absolute Neutrophils 1.6 - 8.3 10e3/uL 2.0  2.0   Absolute NRBCs 10e3/uL 0.0  0.0   NRBCs per 100 WBC <1 /100 0  0   Color Urine Colorless, Straw, Light Yellow, Yellow   Light Yellow    Appearance Urine Clear   Clear    Glucose Urine Negative mg/dL  Negative    Bilirubin Urine Negative   Negative    Ketones Urine Negative mg/dL  Negative    Specific Gravity Urine 1.003 - 1.035   1.019    pH Urine 5.0 - 7.0   6.5    Protein Albumin Urine Negative mg/dL  10 !     Urobilinogen mg/dL Normal, 2.0 mg/dL  Normal    Nitrite Urine Negative   Negative    Blood Urine Negative   Negative    Leukocyte Esterase Urine Negative   Negative    WBC Urine <=5 /HPF  1    RBC Urine <=2 /HPF  <1    Mucus Urine None Seen /LPF  Present !    IGA 84 - 499 mg/dL 66 (L)      - 1,616 mg/dL 513 (L)     IGM 35 - 242 mg/dL 41     [place] Free Lt Chain 0.33 - 1.94 mg/dL 1.53     Kappa Lambda Ratio 0.26 - 1.65  2.13 (H)     Lambda Free Lt Chain 0.57 - 2.63 mg/dL 0.72     Parathyroid Hormone Intact 15 - 65 pg/mL 42     Total Protein Serum for ELP 6.4 - 8.3 g/dL 5.4 (L)     Total Protein Urine mg/dL 1.0 - 14.0 mg/dL  11.9    Total Protein UR MG/MG CR 0.00 - 0.20 mg/mg Cr  0.10    (H): Data is abnormally high  (L): Data is abnormally low  !: Data is abnormal    Assessment and Plan  Logan has multiple myeloma with high risk cytogenetics. He got Vidya-RVD with VGPR, then underwent autologous transplant 11/11/21. On acyclovir daily for viral ppx and bactrim M/T for PJP ppx previously  held both of these due to elevated creatinine. Given his high risk cytogenetics he started vidya + revlimid maintenance therapy 2/21/22.  He had severe flu-like effects from his first dose of Zometa but he has been tolerating monthly Zometa dosed at 3 mg. Continue Vit D. Continues on PO revlimid daily.   - No longer in need of Bactrim as he is > 1 year post-transplant.  - Will hold Zometa today due to elevated Creatinine (will check with Dr. Vazquez how long he needs Zometa as his myeloma labs remain stable post ASCT))  -  C18D1 Daratumumab today (6/5) and continue Revlimid 10 mg daily  -  Will recheck labs in 1 week      Elevated creatinine  Baseline creatinine 1.0-1.1  Creatinine elevation to 1.27 was noted on hospital admission 4/19/23 did not respond to IV fluid while inpatient.  Creatinine peaked at 1.47, now back WNL on most recent lab check.  CT with contrast from 4/19/23 with no obstruction, calculus, or hydronephrosis. UA  4/19 with proteinuria.  - Nephrology consulted-- OP visit 6/5. Myeloma labs continue to show an ongoing remission, so unlikely to be contributing. No signs or symptoms of dehydration to suggest pre-renal concern.   - Decreased acyclovir to 400 mg BID, patient was taking 800 mg BID (BMT dosing)-- this was changed -- per nephrology recs  - Estimated Creatinine Clearance: 62.4 mL/min (A) (based on SCr of 1.36 mg/dL (H)).   - Will hold Zometa today  - Recheck labs in 1 week, will continue Rev 10 mg daily at this time    Hypogammaglobulinemia Start monthly IV Ig whenever IgG <300 (consider increasing to < 400 due to recurrent infections).    - IgG levels today 513    Normocytic Anemia: likely d/t revlimid and daratumumab. Will keep monitoring and dose reduce if needed.    - Stable, Hgb 10.8 today    Immunizations  Received primary COVID series summer 2022.  Bivalent booster 5/8/23    49 minutes spent on the date of the encounter doing chart review, review of test results, patient visit and documentation     PATTY Jamil [facility]  [address] [place] [id number] [phone]      Patient was seen and examined by me, as noted above. I agree with above assessment and plan.     Georgina Cabezas [facility]-BC

## 2023-06-05 NOTE — PROGRESS NOTES
Jun 5, 2023  Oncologic Hx:   - 4/30/2021 M spike of 34.8 in urine.M spike in blood 0.2; IgG 702 with depressed IgA and IgM. Beta 2 microglobulin 2.7. Lambda  with K/L ratio of 0.01. WBC 11.1 with absolute eos of 1.0. hgb, plt, Cr (1.1), and albumin WNL.    -4/30/2021 CT abd/pelvis showed sclerotic marrow changes with numerous lytic appearing lesions throughout the imaged portions of the spine, pelvis and ribs.      -PET scan 5/11/2021 Numerous osteolytic lesions which predominantly demonstrate uptake below liver background levels. There is one intraosseous lesion in the left lateral 9th rib that demonstrates uptake above background, possibly due to nondisplaced fracture. Adjacent to this lesion is mild  hypermetabolism to the left pleura, with new small left pleural effusion, suspicious for malignant effusion versus posttraumatic effusion. Pleural uptake may represent extramedullary myeloma extending along the intercostal space versus inflammation.    - BM bx 5/17/2021 with flow showing 4.0% plasma cells which express CD19 (dim), CD38 (bright), CD45 (dim), , and monotypic cytoplasmic lambda immunoglobulin light chains but lack CD20 and CD56.  Hypercellular bone marrow for age (approximately 75% cellularity) with adequate trilineage hematopoiesis. Plasma cell infiltrate: Interstitial, lambda monotypic and representing approximately 60% the bone marrow cellularity, by  immunohistochemical stain. Cytpgenetics with 2 related hypodiploid clones. One with loss of Y, monosomy 13 and 14 (5%) and one with translocation of 8p and 14, derivative 16 with long arm replaced by extra copy 1q,monosomy 21. FISH showed gain of 1q, loss of 13 and 14, IGH-MYC fusion t(8:14)    - Started Miracle-RVd 21 day with velcade on days 1,8,15.     -Cycle 2 Miracle-RVd. Dose reduce dex to 80 mg d/t fatigue and stomach upset on dex days. Also having cough with basilar streaking on CXR  cou- 8/31/2021 BM bx -rare suspicious plasma cells  in touch imprint. No increase in plasma cells by morph.    -8/31/2021 PET/CT Diffuse osteolytic lesions throughout the axial and appendicular skeleton. Increased sclerosis since prior exam 5/11/2021 without increased metabolism or size.  Hypermetabolic pretracheal lymph node as well as hypermetabolic level 2 lymph nodes within the right neck. These lymph nodes are likely reactive from autoimmune activation via medical therapy. Hypermetabolic subcentimeter nodules within the thyroid gland    - 11/11/2021 Autologous BMT    - 2/21/22 started maintenance with Revlimid 10 mg daily and daratumumab monthly; did Revlimid alone for C1 due to low IgG levels, added daratumumab starting C2      Interval Hx:   June is seen in clinic today for routine follow-up.  He has been feeling well, his energy levels are improving.  He feels he is fully recovered from RLL pneumonia. Ear pain has also resolved.  Denies fevers, chills, cough, SOB, N/V/D/C. He has some stable neuropathy in his R upper extremity and L lower extremity, drenching night sweats, unexplained weight loss.     14 comprehensive ROS neg    Physical Exam:  /72 (BP Location: Right arm, Patient Position: Sitting, Cuff Size: Adult Regular)   Pulse 66   Temp 98.3  F (36.8  C) (Oral)   Resp 16   Wt 71.1 kg (156 lb 12.8 oz)   SpO2 100%   BMI 25.32 kg/m    General: No acute distress  HEENT: Sclera anicteric. Oral exam deferred  Lymph: No lymphadenopathy in neck  Heart: Regular, rate, and rhythm  Lungs: Wheezes  to ascultation bilaterally in lower lobes  Extremities: no lower extremity edema  Neuro: Cranial nerves grossly intact  Rash: none on exposed skin     Latest Reference Range & Units 06/05/23 07:15 06/05/23 07:18 06/05/23 11:57   Sodium 136 - 145 mmol/L 138  137   Potassium 3.4 - 5.3 mmol/L 3.6  3.6   Chloride 98 - 107 mmol/L 107  105   Carbon Dioxide (CO2) 22 - 29 mmol/L 24  23   Urea Nitrogen 6.0 - 20.0 mg/dL 17.3  16.2   Creatinine 0.67 - 1.17 mg/dL  1.47 (H)  1.36 (H)   GFR Estimate >60 mL/min/1.73m2 56 (L)  62   Calcium 8.6 - 10.0 mg/dL 8.7  8.6   Anion Gap 7 - 15 mmol/L 7  9   Phosphorus 2.5 - 4.5 mg/dL 2.7     Albumin 3.5 - 5.2 g/dL 4.1  4.3   Protein Total 6.4 - 8.3 g/dL 6.1 (L)  6.4   Alkaline Phosphatase 40 - 129 U/L 52  56   ALT 10 - 50 U/L 17  17   AST 10 - 50 U/L 26  33   Bilirubin Total <=1.2 mg/dL 0.4  0.5   Creatinine Urine mg/dL  119.0    Glucose 70 - 99 mg/dL 100 (H)  112 (H)   WBC 4.0 - 11.0 10e3/uL 4.4  4.6   Hemoglobin 13.3 - 17.7 g/dL 10.6 (L)  10.8 (L)   Hematocrit 40.0 - 53.0 % 33.2 (L)  32.9 (L)   Platelet Count 150 - 450 10e3/uL 306  316   RBC Count 4.40 - 5.90 10e6/uL 3.54 (L)  3.60 (L)   MCV 78 - 100 fL 94  91   MCH 26.5 - 33.0 pg 29.9  30.0   MCHC 31.5 - 36.5 g/dL 31.9  32.8   RDW 10.0 - 15.0 % 17.2 (H)  17.2 (H)   % Neutrophils % 44  43   % Lymphocytes % 34  37   % Monocytes % 14  14   % Eosinophils % 7  5   % Basophils % 1  1   Absolute Basophils 0.0 - 0.2 10e3/uL 0.0  0.0   Absolute Eosinophils 0.0 - 0.7 10e3/uL 0.3  0.2   Absolute Immature Granulocytes <=0.4 10e3/uL 0.0  0.0   Absolute Lymphocytes 0.8 - 5.3 10e3/uL 1.5  1.7   Absolute Monocytes 0.0 - 1.3 10e3/uL 0.6  0.6   % Immature Granulocytes % 0  0   Absolute Neutrophils 1.6 - 8.3 10e3/uL 2.0  2.0   Absolute NRBCs 10e3/uL 0.0  0.0   NRBCs per 100 WBC <1 /100 0  0   Color Urine Colorless, Straw, Light Yellow, Yellow   Light Yellow    Appearance Urine Clear   Clear    Glucose Urine Negative mg/dL  Negative    Bilirubin Urine Negative   Negative    Ketones Urine Negative mg/dL  Negative    Specific Gravity Urine 1.003 - 1.035   1.019    pH Urine 5.0 - 7.0   6.5    Protein Albumin Urine Negative mg/dL  10 !    Urobilinogen mg/dL Normal, 2.0 mg/dL  Normal    Nitrite Urine Negative   Negative    Blood Urine Negative   Negative    Leukocyte Esterase Urine Negative   Negative    WBC Urine <=5 /HPF  1    RBC Urine <=2 /HPF  <1    Mucus Urine None Seen /LPF  Present !    IGA 84 - 499  mg/dL 66 (L)      - 1,616 mg/dL 513 (L)     IGM 35 - 242 mg/dL 41     [place] Free Lt Chain 0.33 - 1.94 mg/dL 1.53     Kappa Lambda Ratio 0.26 - 1.65  2.13 (H)     Lambda Free Lt Chain 0.57 - 2.63 mg/dL 0.72     Parathyroid Hormone Intact 15 - 65 pg/mL 42     Total Protein Serum for ELP 6.4 - 8.3 g/dL 5.4 (L)     Total Protein Urine mg/dL 1.0 - 14.0 mg/dL  11.9    Total Protein UR MG/MG CR 0.00 - 0.20 mg/mg Cr  0.10    (H): Data is abnormally high  (L): Data is abnormally low  !: Data is abnormal    Assessment and Plan  Logan has multiple myeloma with high risk cytogenetics. He got Vidya-RVD with VGPR, then underwent autologous transplant 11/11/21. On acyclovir daily for viral ppx and bactrim M/T for PJP ppx previously  held both of these due to elevated creatinine. Given his high risk cytogenetics he started vidya + revlimid maintenance therapy 2/21/22.  He had severe flu-like effects from his first dose of Zometa but he has been tolerating monthly Zometa dosed at 3 mg. Continue Vit D. Continues on PO revlimid daily.   - No longer in need of Bactrim as he is > 1 year post-transplant.  - Will hold Zometa today due to elevated Creatinine (will check with Dr. Vazquez how long he needs Zometa as his myeloma labs remain stable post ASCT))  -  C18D1 Daratumumab today (6/5) and continue Revlimid 10 mg daily  -  Will recheck labs in 1 week      Elevated creatinine  Baseline creatinine 1.0-1.1  Creatinine elevation to 1.27 was noted on hospital admission 4/19/23 did not respond to IV fluid while inpatient.  Creatinine peaked at 1.47, now back WNL on most recent lab check.  CT with contrast from 4/19/23 with no obstruction, calculus, or hydronephrosis. UA 4/19 with proteinuria.  - Nephrology consulted-- OP visit 6/5. Myeloma labs continue to show an ongoing remission, so unlikely to be contributing. No signs or symptoms of dehydration to suggest pre-renal concern.   - Decreased acyclovir to 400 mg BID, patient was taking 800  mg BID (BMT dosing)-- this was changed -- per nephrology recs  - Estimated Creatinine Clearance: 62.4 mL/min (A) (based on SCr of 1.36 mg/dL (H)).   - Will hold Zometa today  - Recheck labs in 1 week, will continue Rev 10 mg daily at this time    Hypogammaglobulinemia Start monthly IV Ig whenever IgG <300 (consider increasing to < 400 due to recurrent infections).    - IgG levels today 513    Normocytic Anemia: likely d/t revlimid and daratumumab. Will keep monitoring and dose reduce if needed.    - Stable, Hgb 10.8 today    Immunizations  Received primary COVID series summer 2022.  Bivalent booster 5/8/23    49 minutes spent on the date of the encounter doing chart review, review of test results, patient visit and documentation     PATTY Jamil [facility]  [address] [place] [address] [phone]      Patient was seen and examined by me, as noted above. I agree with above assessment and plan.     Georgina Cabezas ACNP-BC

## 2023-06-05 NOTE — PATIENT INSTRUCTIONS
It was a pleasure taking care of you today.  I've included a brief summary of our discussion and care plan from today's visit below.  Please review this information with your primary care provider.  _______________________________________________________________________    My recommendations are summarized as follows:  -Keep the amount of sodium in your diet at 2.4 g/day (also see instructions attached in that regard)  -Keep a Blood Pressure diary by taking your blood pressure twice a day as instructed (also see instructions attached in that regard). Please make sure that you are using a validated blood pressure device by checking that it is the case at: https://www.validatebp.org/  -Avoid all NSAID's. Examples include Ibuprofen (Advil, Motrin), naprosyn (Aleve), celebrex among others. Acetaminophen (Tylenol) is ok with maximum dose in 24 hours of 3200 mg.  -Healthy lifestyle measures will keep your kidney's functioning at their current best. This includes regular exercise, weight loss and smoking cessation if you smoke.   -Do not exceed one alcoholic drink per day  -Stop losartan  -Start amlodipine 2.5 mg daily  -Decrease acyclovir 400 mg twice daily      To schedule imaging please call [phone]     To schedule your lab appointment at the [facility] and [facility], please call       Return to Nephrology Clinic in 2 weeks to review your progress.    _______________________________________________________________________    Who do I call with any questions after my visit?    Please be in touch if there are any further questions that arise following today's visit.  There are multiple ways to contact your nephrology care team.      During business hours, you may reach your Nephrology LPN Care Coordinator, Ronda, at .      To schedule or reschedule an appointment, please call [phone].    You can always send a secure message through [employer].  [employer] messages are answered by your  nurse or doctor typically within 24 hours.  Please allow extra time on weekends and holidays.      For urgent/emergent questions after business hours, you may reach the on-call Nephrology Fellow by contacting the [facility]  at [phone].     How will I get the results of any tests ordered?    You will receive all of your results.  If you have signed up for [employer], any tests ordered at your visit will be available to you after your physician reviews them.  Typically this takes 1-2 weeks.  If there are urgent results that require a change in your care plan, your physician or nurse will call you to discuss the next steps.      What is [employer]?  [employer] is a secure way for you to access all of your healthcare records from the [facility].  It is a web based computer program, so you can sign on to it from any location.  It also allows you to send secure messages to your care team.  I recommend signing up for [employer] access if you have not already done so and are comfortable with using a computer.      How do I schedule a follow-up visit?  If you did not schedule a follow-up visit today, please call [phone] to schedule a follow-up office visit.        Sincerely,      Dr. Yuliya Leiva  [facility]  Division of Nephrology and Hypertension

## 2023-06-05 NOTE — LETTER
6/5/2023       RE: June Ronquillo  [address]  [facility] [id number]     Dear Colleague,    Thank you for referring your patient, June Ronquillo, to the [facility] [place] at [facility]. Please see a copy of my visit note below.    Nephrology Clinic    June Ronquillo MRN:[id number] YOB: 1970  Date of Service: 06/05/2023  Primary care provider: Selma Johnson  Requesting physician: Laura Ballesteros APRN CNP      REASON FOR CONSULT: Acute kidney Injury    HISTORY OF PRESENT ILLNESS:   June Ronquillo is a 52 year old male who presents for evaluation of acute kidney injury.  The past medical history is significant for hypertension diagnosed 5 years ago, hypothyroidism s/p thyroidectomy on levothyroxine and multiple myeloma diagnosed in April 2021.    The history of his myeloma is the following:  - 4/30/2021 M spike of 34.8 in urine.M spike in blood 0.2; IgG 702 with depressed IgA and IgM. Beta 2 microglobulin 2.7. Lambda  with K/L ratio of 0.01. WBC 11.1 with absolute eos of 1.0. hgb, plt, Cr (1.1), and albumin WNL.     -4/30/2021 CT abd/pelvis showed sclerotic marrow changes with numerous lytic appearing lesions throughout the imaged portions of the spine, pelvis and ribs.      -PET scan 5/11/2021 Numerous osteolytic lesions which predominantly demonstrate uptake below liver background levels. There is one intraosseous lesion in the left lateral 9th rib that demonstrates uptake above background, possibly due to nondisplaced fracture. Adjacent to this lesion is mild  hypermetabolism to the left pleura, with new small left pleural effusion, suspicious for malignant effusion versus posttraumatic effusion. Pleural uptake may represent extramedullary myeloma extending along the intercostal space versus inflammation.     - BM bx 5/17/2021 with flow showing 4.0% plasma cells which express CD19  (dim), CD38 (bright), CD45 (dim), , and monotypic cytoplasmic lambda immunoglobulin light chains but lack CD20 and CD56.  Hypercellular bone marrow for age (approximately 75% cellularity) with adequate trilineage hematopoiesis. Plasma cell infiltrate: Interstitial, lambda monotypic and representing approximately 60% the bone marrow cellularity, by  immunohistochemical stain. Cytpgenetics with 2 related hypodiploid clones. One with loss of Y, monosomy 13 and 14 (5%) and one with translocation of 8p and 14, derivative 16 with long arm replaced by extra copy 1q,monosomy 21. FISH showed gain of 1q, loss of 13 and 14, IGH-MYC fusion t(8:14)       - Started Miracle-RVd 21 day with velcade on days 1,8,15.        - 11/11/2021 Autologous BMT    Given his high risk cytogenetics he started miracle + revlimid maintenance therapy 2/21/22. He has also been receiving zoledronic acid infusions dosed at 3 mg on a monthly basis. He last received zoledronic acid and daratumumab on 5/08.       He presented on 4/19 with fever, rigors, chest pain, and shortness of breath.Found to have RLL pneumonia c/b some right sided back pain thought to be 2/2 diaphragmatic irritation. Discharged on 7d course of Levaquin. He was concomitantly found to have AZUCENA with a creatinine level that went up to 1.27 mg/dL from a baseline at 1. It was 1.36 upon discharge and 1.47 mg/dL on 4/28. It had improved to 1.13 mg/dL on 5/08 when he received zoledronic acid and daratumumab and is 1.47 mg/dL at the time of this visit. The UPCR is       Was on acyclovir daily for viral ppx and bactrim M/T for PJP ppx and they were held when he developed AZUCENA. Acyclovir was restarted but not bactrim.  Continues on PO revlimid daily and losartan 50 mg daily. He doesn't measure his BP at home. It is 127/77 in clinic today.    Renal imaging  The patient denies any dysuria, any pollakiuria, any LE edema, any dyspnea on exertion. Reports new onset nocturia. Reports adequate PO  intake.    The following portions of the patient's history were reviewed and updated as appropriate: allergies, current medications, past family history, past medical history, past social history, past surgical history and problem list.    PAST MEDICAL HISTORY:  Past Medical History:   Diagnosis Date     CAD (coronary artery disease)     s/p stent to CFX     Heart attack (H)      Hyperlipidemia LDL goal <100      Hypertension      Stented coronary artery      Thyroid disease      PAST SURGICAL HISTORY:  Past Surgical History:   Procedure Laterality Date     BONE MARROW BIOPSY, BONE SPECIMEN, NEEDLE/TROCAR Left 5/17/2021    Procedure: BIOPSY, BONE MARROW with aspiration and ancillary studies;  Surgeon: Niharika Regalado MD;  Location: [facility] OR     BONE MARROW BIOPSY, BONE SPECIMEN, NEEDLE/TROCAR Left 10/14/2021    Procedure: BIOPSY, BONE MARROW;  Surgeon: Alexa Albert APRN CNP;  Location: [facility] OR     BONE MARROW BIOPSY, BONE SPECIMEN, NEEDLE/TROCAR Right 3/7/2022    Procedure: BIOPSY, BONE MARROW;  Surgeon: Deborah Carmona PA-C;  Location: [facility] OR     BONE MARROW BIOPSY, BONE SPECIMEN, NEEDLE/TROCAR N/A 5/9/2022    Procedure: BIOPSY, BONE MARROW;  Surgeon: Deborah Leblanc PA-C;  Location: [facility] OR     BONE MARROW BIOPSY, BONE SPECIMEN, NEEDLE/TROCAR Left 12/5/2022    Procedure: BIOPSY, BONE MARROW;  Surgeon: Alba Moses APRN CNP;  Location: [facility] OR     HEART CATH PTCA SINGLE VESSEL  10/10/2004    Stent to X - [facility]      INSERT CATHETER VASCULAR ACCESS Right 11/3/2021    Procedure: NON VALVED TUNNELED DUAL LUMEN APHARESIS CAPABLE LINE INSERTION, VASCULAR ACCESS CATHETER;  Surgeon: Werner Mcnamara MD;  Location: [facility] OR     IR CVC TUNNEL PLACEMENT > 5 YRS OF AGE  11/3/2021     IR CVC TUNNEL REMOVAL RIGHT  12/2/2021     THYROIDECTOMY N/A 5/3/2022    Procedure: total thyroidectomy, Left selective neck dissection level 6 and level 7 ;  Surgeon: Clarita Sepulveda MD;  Location: [facility]  OR     MEDICATIONS:  Prescription Medications as of 6/5/2023       Rx Number Disp Refills Start End Last Dispensed Date Next Fill Date Owning Pharmacy    acetaminophen (TYLENOL) 500 MG tablet            Sig: Take 500-1,000 mg by mouth every 6 hours as needed for mild pain    Class: Historical    Route: Oral    acyclovir (ZOVIRAX) 800 MG tablet  180 tablet 1 4/21/2023    [address]    Sig: Take 1 tablet (800 mg) by mouth 2 times daily    Class: E-Prescribe    Notes to Pharmacy: HOLD until outpatient follow up    Route: Oral    aspirin (ASA) 81 MG EC tablet    12/9/2021        Sig: Take 1 tablet (81 mg) by mouth daily    Class: Historical    Route: Oral    baclofen (LIORESAL) 10 MG tablet  30 tablet 0 4/20/2023    [address]    Sig: Take 1 tablet (10 mg) by mouth 3 times daily as needed for muscle spasms    Class: E-Prescribe    Route: Oral    calcium carbonate (TUMS) 500 MG chewable tablet  90 tablet 1 5/3/2022    [facility] [id number]    Sig: Take 2 tablets (1,000 mg) by mouth 3 times daily    Class: E-Prescribe    Route: Oral    calcium carbonate-vitamin D (OSCAL) 500-5 MG-MCG tablet  90 tablet 3 5/22/2023    [facility] [place], [place] - [address]    Sig: TAKE ONE TABLET BY MOUTH ONCE DAILY    Class: E-Prescribe    LENalidomide (REVLIMID) 10 MG CAPS capsule  28 capsule 0 5/8/2023    [facility] [place], [place] - [address]    Sig: Take 1 capsule (10 mg) by mouth daily    Class: E-Prescribe    Notes to Pharmacy: Auth#[id number]    Route: Oral    LENalidomide (REVLIMID) 10 MG CAPS capsule  28 capsule 0 4/20/2023    [address]    Sig: Take 1 capsule (10 mg) by mouth daily HOLD until follow up in oncology clinic    Class: E-Prescribe     Route: Oral    levothyroxine (SYNTHROID/LEVOTHROID) 125 MCG tablet  90 tablet 2 1/9/2023    [facility] [address]    Sig: Take 1 tablet (125 mcg) by mouth daily    Class: E-Prescribe    Route: Oral    Lidocaine (LIDOCARE) 4 % Patch  30 patch 0 4/21/2023    [place], [place] - [address]    Sig: Place 2 patches onto the skin every 24 hours To prevent lidocaine toxicity, patient should be patch free for 12 hrs daily.    Class: E-Prescribe    Route: Transdermal    losartan (COZAAR) 50 MG tablet  90 tablet 3 8/16/2022    [facility] [address]    Sig: Take 1 tablet (50 mg) by mouth daily    Class: E-Prescribe    Route: Oral    potassium chloride ER (KLOR-CON M) 10 MEQ CR tablet  60 tablet 3 1/16/2023    [facility] [address]    Sig: Take 2 tablets (20 mEq) by mouth daily    Class: E-Prescribe    Route: Oral    rosuvastatin (CRESTOR) 40 MG tablet  90 tablet 3 9/13/2022    [facility] [address]    Sig: Take 1 tablet (40 mg) by mouth daily    Class: E-Prescribe    Route: Oral    vitamin B complex with vitamin C (STRESS TAB) tablet  90 tablet 3 5/16/2023    [facility] [address]    Sig: Take 1 tablet by mouth daily    Class: E-Prescribe    Route: Oral         ALLERGIES:    Allergies   Allergen Reactions     Blood Transfusion Related (Informational Only) Other (See Comments)     Patient has a history of a clinically significant antibody against RBC antigens.  A delay in compatible RBCs may occur.      REVIEW OF SYSTEMS:  Review Of Systems  Skin: negative for, pigmentation, acne, rash, scaling, itching, bruising  Eyes: negative for, visual blurring, double vision, glaucoma, cataracts, eye pain, color blindness, glasses  Ears/Nose/Throat: negative for, nasal congestion, purulent  rhinorrhea, sneezing, postnasal drainage, hearing loss, deafness, tinnitus, vertigo  Respiratory: No shortness of breath, dyspnea on exertion, cough, or hemoptysis  Cardiovascular: negative for, palpitations, tachycardia, irregular heart beat, chest pain, exertional chest pain or pressure and paroxysmal nocturnal dyspnea  Gastrointestinal: negative for, poor appetite, dysphagia, nausea, vomiting, heartburn, dyspepsia, reflux and hematemesis  Genitourinary: negative for, nocturia, dysuria, frequency, urgency, hesitancy, hematuria, retention and decreased urinary stream  Musculoskeletal: negative for, fracture, back pain, neck pain, arthritis, joint pain, joint swelling and joint stiffness  Neurologic: negative for, headaches, syncope, stroke, seizures, paralysis, local weakness, numbness or tingling of hands and numbness or tingling of feet    A comprehensive review of systems was performed and found to be negative except as described here or above.  SOCIAL HISTORY:   Social History     Socioeconomic History     Marital status:      Spouse name: Not on file     Number of children: Not on file     Years of education: Not on file     Highest education level: Not on file   Occupational History     Not on file   Tobacco Use     Smoking status: Never     Smokeless tobacco: Never   Vaping Use     Vaping status: Not on file   Substance and Sexual Activity     Alcohol use: Not Currently     Drug use: No     Sexual activity: Yes     Partners: Female   Other Topics Concern     Parent/sibling w/ CABG, MI or angioplasty before 65F 55M? No      Service Not Asked     Blood Transfusions Not Asked     Caffeine Concern No     Occupational Exposure Not Asked     Hobby Hazards Not Asked     Sleep Concern Not Asked     Stress Concern Not Asked     Weight Concern Not Asked     Special Diet No     Back Care Not Asked     Exercise No     Bike Helmet Not Asked     Seat Belt Yes     Self-Exams Not Asked   Social History  Narrative     Not on file     Social Determinants of Health     Financial Resource Strain: Not on file   Food Insecurity: Not on file   Transportation Needs: Not on file   Physical Activity: Not on file   Stress: Not on file   Social Connections: Not on file   Intimate Partner Violence: Not At Risk (12/8/2022)    Humiliation, Afraid, Rape, and Kick questionnaire      Fear of Current or Ex-Partner: No      Emotionally Abused: No      Physically Abused: No      Sexually Abused: No   Housing Stability: Not on file     FAMILY MEDICAL HISTORY:   Family History   Problem Relation Age of Onset     Hypertension Mother      Hyperlipidemia Mother      Heart Disease Paternal Grandmother      Hyperlipidemia Sister      Hyperlipidemia Sister      PHYSICAL EXAM:   There were no vitals taken for this visit.  GENERAL APPEARANCE: alert and no distress  EYES: nonicteric  HENT: mouth without ulcers or lesions  NECK: supple, no adenopathy  RESP: lungs clear to auscultation   CV: regular rhythm, normal rate, no rub  ABDOMEN: soft, nontender, normal bowel sounds, no HSM   Extremities: no clubbing, cyanosis, or edema  MS: no evidence of inflammation in joints, no muscle tenderness  SKIN: no rash  NEURO: mentation intact and speech normal  PSYCH: affect normal/bright   LABS:   Recent Results (from the past 672 hour(s))   Protein Immunofixation Serum    Collection Time: 05/08/23 12:47 PM   Result Value Ref Range    Immunofixation ELP       Possible small monoclonal IgG immunoglobulin of kappa light chain type. Monoclonal antibody therapeutics (e.g. Daratumumab) may appear as monoclonal proteins on serum electrophoresis and immunofixation. Results should be interpreted with caution. Pathologic significance requires clinical correlation.  Nunu Barber M.D., Ph.D.   Immunoglobulins A G and M    Collection Time: 05/08/23 12:47 PM   Result Value Ref Range    Immunoglobulin G 417 (L) 610 - 1,616 mg/dL    Immunoglobulin A 65 (L) 84 - 499 mg/dL     Immunoglobulin M 38 35 - 242 mg/dL   Kappa and lambda light chain    Collection Time: 05/08/23 12:47 PM   Result Value Ref Range    Kappa Free Light Chains 0.78 0.33 - 1.94 mg/dL    Lambda Free Light Chains 0.49 (L) 0.57 - 2.63 mg/dL    Kappa /Lambda Ratio 1.59 0.26 - 1.65   Comprehensive metabolic panel    Collection Time: 05/08/23 12:47 PM   Result Value Ref Range    Sodium 141 136 - 145 mmol/L    Potassium 4.0 3.4 - 5.3 mmol/L    Chloride 108 (H) 98 - 107 mmol/L    Carbon Dioxide (CO2) 23 22 - 29 mmol/L    Anion Gap 10 7 - 15 mmol/L    Urea Nitrogen 13.7 6.0 - 20.0 mg/dL    Creatinine 1.13 0.67 - 1.17 mg/dL    Calcium 8.9 8.6 - 10.0 mg/dL    Glucose 135 (H) 70 - 99 mg/dL    Alkaline Phosphatase 75 40 - 129 U/L    AST 27 10 - 50 U/L    ALT 15 10 - 50 U/L    Protein Total 6.5 6.4 - 8.3 g/dL    Albumin 4.3 3.5 - 5.2 g/dL    Bilirubin Total 0.2 <=1.2 mg/dL    GFR Estimate 77 >60 mL/min/1.73m2   Total Protein, Serum for ELP    Collection Time: 05/08/23 12:47 PM   Result Value Ref Range    Total Protein Serum for ELP 6.2 (L) 6.4 - 8.3 g/dL   Protein Electrophoresis, Serum    Collection Time: 05/08/23 12:47 PM   Result Value Ref Range    Albumin 4.0 3.7 - 5.1 g/dL    Alpha 1 0.3 0.2 - 0.4 g/dL    Alpha 2 0.7 0.5 - 0.9 g/dL    Beta Globulin 0.8 0.6 - 1.0 g/dL    Gamma Globulin 0.4 (L) 0.7 - 1.6 g/dL    Monoclonal Peak 0.0 <=0.0 g/dL    ELP Interpretation       No monoclonal protein seen by capillary electrophoresis, however a possible small monoclonal protein was seen in this sample by immunofixation which is a more sensitive method for monoclonal detection. Hypogammaglobulinemia. Pathologic significance requires clinical correlation. Nunu Barber M.D., Ph.D.   CBC with platelets and differential    Collection Time: 05/08/23 12:47 PM   Result Value Ref Range    WBC Count 6.1 4.0 - 11.0 10e3/uL    RBC Count 3.81 (L) 4.40 - 5.90 10e6/uL    Hemoglobin 11.0 (L) 13.3 - 17.7 g/dL    Hematocrit 34.2 (L) 40.0 - 53.0 %    MCV 90  78 - 100 fL    MCH 28.9 26.5 - 33.0 pg    MCHC 32.2 31.5 - 36.5 g/dL    RDW 17.2 (H) 10.0 - 15.0 %    Platelet Count 404 150 - 450 10e3/uL    % Neutrophils 46 %    % Lymphocytes 25 %    % Monocytes 10 %    % Eosinophils 18 %    % Basophils 1 %    % Immature Granulocytes 0 %    NRBCs per 100 WBC 0 <1 /100    Absolute Neutrophils 2.9 1.6 - 8.3 10e3/uL    Absolute Lymphocytes 1.5 0.8 - 5.3 10e3/uL    Absolute Monocytes 0.6 0.0 - 1.3 10e3/uL    Absolute Eosinophils 1.1 (H) 0.0 - 0.7 10e3/uL    Absolute Basophils 0.0 0.0 - 0.2 10e3/uL    Absolute Immature Granulocytes 0.0 <=0.4 10e3/uL    Absolute NRBCs 0.0 10e3/uL   CBC with platelets and differential    Collection Time: 06/05/23  7:15 AM   Result Value Ref Range    WBC Count 4.4 4.0 - 11.0 10e3/uL    RBC Count 3.54 (L) 4.40 - 5.90 10e6/uL    Hemoglobin 10.6 (L) 13.3 - 17.7 g/dL    Hematocrit 33.2 (L) 40.0 - 53.0 %    MCV 94 78 - 100 fL    MCH 29.9 26.5 - 33.0 pg    MCHC 31.9 31.5 - 36.5 g/dL    RDW 17.2 (H) 10.0 - 15.0 %    Platelet Count 306 150 - 450 10e3/uL    % Neutrophils 44 %    % Lymphocytes 34 %    % Monocytes 14 %    % Eosinophils 7 %    % Basophils 1 %    % Immature Granulocytes 0 %    NRBCs per 100 WBC 0 <1 /100    Absolute Neutrophils 2.0 1.6 - 8.3 10e3/uL    Absolute Lymphocytes 1.5 0.8 - 5.3 10e3/uL    Absolute Monocytes 0.6 0.0 - 1.3 10e3/uL    Absolute Eosinophils 0.3 0.0 - 0.7 10e3/uL    Absolute Basophils 0.0 0.0 - 0.2 10e3/uL    Absolute Immature Granulocytes 0.0 <=0.4 10e3/uL    Absolute NRBCs 0.0 10e3/uL   UA with Microscopic    Collection Time: 06/05/23  7:18 AM   Result Value Ref Range    Color Urine Light Yellow Colorless, Straw, Light Yellow, Yellow    Appearance Urine Clear Clear    Glucose Urine Negative Negative mg/dL    Bilirubin Urine Negative Negative    Ketones Urine Negative Negative mg/dL    Specific Gravity Urine 1.019 1.003 - 1.035    Blood Urine Negative Negative    pH Urine 6.5 5.0 - 7.0    Protein Albumin Urine 10 (A) Negative  mg/dL    Urobilinogen Urine Normal Normal, 2.0 mg/dL    Nitrite Urine Negative Negative    Leukocyte Esterase Urine Negative Negative    Mucus Urine Present (A) None Seen /LPF    RBC Urine <1 <=2 /HPF    WBC Urine 1 <=5 /HPF     CMP  Recent Labs   Lab Test 05/08/23  1247 05/01/23  1703 04/28/23  1456 04/21/23  0617 04/20/23  0939 04/19/23  2227 04/19/23  1827 12/05/22  1144 11/10/22  1310 07/05/22  0922 06/17/22  1332 05/19/22  1352 05/09/22  0928 03/24/22  1503 03/07/22  0834 07/16/21  1442 07/09/21  1440 07/01/21  1441 06/25/21  1452 06/18/21  1444    142 137 138  --    < > 136   < > 137   < > 139   < > 140   < > 141   < > 137 136 135 137   POTASSIUM 4.0 4.8 3.9 3.5  3.3*  --    < > 3.4   < > 3.6   < > 3.5   < > 4.0   < > 3.6   < > 3.7 3.2* 3.2* 3.3*   CHLORIDE 108* 110* 104 108*  --    < > 105   < > 104   < > 108   < > 106   < > 109   < > 107 103 101 104   CO2 23 23 22 18*  --    < > 19*   < > 21*   < > 22   < > 26   < > 22   < > 24 23 24 24   ANIONGAP 10 9 11 12  --    < > 12   < > 12   < > 9   < > 8   < > 10   < > 5 9 10 9   * 106* 112* 92  --    < > 119*   < > 145*   < > 127*   < > 106*   < > 106*   < > 91 131* 123* 116*   BUN 13.7 11.2 22.3* 10.5  --    < > 10.5   < > 16.4   < > 17   < > 13   < > 17   < > 8 15 10 14   CR 1.13 1.16 1.47* 1.36*  --    < > 1.24*   < > 1.30*   < > 0.97   < > 0.99   < > 0.95   < > 0.87 1.21 0.96 0.89   GFRESTIMATED 77 75 56* 62  --    < > 69   < > 65   < > >90   < > >90   < > >90   < > >90 68 >90 >90   GFRESTBLACK  --   --   --   --   --   --   --   --   --   --   --   --   --   --   --   --  >90 79 >90 >90   HARDEEP 8.9 8.8 9.9 8.2*  --    < > 8.4*   < > 9.1   < > 9.5   < > 9.3   < > 8.8   < > 8.4* 8.2* 8.2* 8.2*   MAG  --   --  2.6* 1.7 1.9  --   --   --  1.9  --   --   --  1.8  --  1.9   < >  --   --   --   --    PHOS  --   --   --   --   --   --   --   --  2.6  --  3.6  --  3.0  --  3.2   < >  --   --   --   --    PROTTOTAL 6.5 6.8 6.8  --   --   --  6.3*   < > 6.5    < > 6.4*   < > 6.5*   < > 6.2*   < > 6.1* 6.0* 6.5* 6.6*   ALBUMIN 4.3 4.3 4.2  --   --   --  3.8   < > 4.5   < > 4.0   < > 3.8   < > 3.8   < > 3.2* 3.0* 3.1* 3.3*   BILITOTAL 0.2 0.2 0.2  --   --   --  0.5   < > 0.4   < > 0.5   < > 0.4   < > 0.3   < > 0.4 0.3 0.4 0.4   ALKPHOS 75 78 82  --   --   --  68   < > 53   < > 54   < > 54   < > 50   < > 76 91 103 103   AST 27 22 20  --   --   --  23   < > 24   < > 20   < > 26   < > 16   < > 23 32 25 20   ALT 15 13 14  --   --   --  16   < > 12   < > 26   < > 27   < > 23   < > 25 42 36 30    < > = values in this interval not displayed.     CBC  Recent Labs   Lab Test 06/05/23  0715 05/08/23  1247 05/01/23  1703 04/28/23  1456   HGB 10.6* 11.0* 11.3* 11.2*   WBC 4.4 6.1 6.2 6.5   RBC 3.54* 3.81* 3.86* 3.90*   HCT 33.2* 34.2* 36.0* 35.2*   MCV 94 90 93 90   MCH 29.9 28.9 29.3 28.7   MCHC 31.9 32.2 31.4* 31.8   RDW 17.2* 17.2* 17.1* 16.9*    404 507* 511*     INR  Recent Labs   Lab Test 12/05/22  1144 05/09/22  0928 03/07/22  0834 11/23/21  0415 11/22/21  1012 11/22/21  0414 11/21/21  0422 11/14/21  1103 11/10/21  0842 10/14/21  0943 10/13/21  1354   INR 0.98 0.89 0.96 1.16*  --    < > 1.23*   < > 0.95   < > 1.03   PTT  --   --   --   --  41*  --  46*  --  28  --  31    < > = values in this interval not displayed.     ABGNo lab results found.   URINE STUDIES  Recent Labs   Lab Test 06/05/23  0718 04/28/23  1600 04/19/23  1439 11/20/21  0900 10/13/21  1333 04/30/21  1023   COLOR Light Yellow Yellow Yellow Straw   < > Yellow   APPEARANCE Clear Clear Clear Clear   < > Clear   URINEGLC Negative Negative Negative Negative   < > Negative   URINEBILI Negative Negative Negative Negative   < > Negative   URINEKETONE Negative Negative Negative Negative   < > Negative   SG 1.019 1.014 1.017 1.008   < > 1.015   UBLD Negative Negative Negative Negative   < > Trace*   URINEPH 6.5 5.5 7.5* 7.0   < > 7.0   PROTEIN 10* Negative 50* Negative   < > Trace*   UROBILINOGEN  --   --   --   --    --  0.2   NITRITE Negative Negative Negative Negative   < > Negative   LEUKEST Negative Negative Negative Negative   < > Negative   RBCU <1 1 2 1   < > O - 2   WBCU 1 1 5 1   < > 0 - 5    < > = values in this interval not displayed.     Recent Labs   Lab Test 05/09/22  0900 10/16/21  0525   UTPG 0.11 0.06       ASSESSMENT AND PLAN:   #AZUCENA possibly secondary to prerenal azotemia with hemodynamic changes induced by losartan. Acyclovir toxicity is also a concern. Renal imaging done on 4/28 is unremarkable. The UPCR is 0.1 mg/mg Cr. Will stop the losartan and replace it with amlodipine 2.5 mg daily. Will also decrease acyclovir to 400 mg twice daily. The patient was asked to keep a BP diary and to communicate the results at next visit. I will also recheck a CMP in 2 weeks     #HTN  Primary and secondary to CKD. Management as per above.     #Blood count  Hemoglobin  10.8 management per oncology    #Acid-base status  CO2 level 23 no acute issue    #Electrolytes  Na 137  K 3.6     #BMD  Ca  8.6        P 2.7  Alb 4.3  Vit D level 34 no need for any supplementation      The total time of this encounter amounted to 60 minutes on the day of the encounter. This time included time spent with the patient, reviewing records, ordering tests, and performing post visit documentation.   Labs ordered include: CBC with diff, Renal Panel, CMP, UA with microscopy, UPCR, UACR, vitamin D levels    The patient will return to follow up in 2 weeks    Yuliya Leiva MD  Division of Renal Disease and Hypertension  June 5, 2023  7:31 AM      Again, thank you for allowing me to participate in the care of your patient.      Sincerely,    Yuliya Leiva MD

## 2023-06-05 NOTE — PROGRESS NOTES
Infusion Nursing Note:  June Ronquillo presents today for Cycle 18 day 1 Daratumumab- hyaluronidase- fij    Patient seen by provider today: Yes: AMIRA Cabezas.    present during visit today: Not Applicable.    Note: Zometa is on hold today per Georgina.       Intravenous Access:  Peripheral IV placed.    Treatment Conditions:  Lab Results   Component Value Date    HGB 10.8 (L) 06/05/2023    WBC 4.6 06/05/2023    ANEU 4.7 03/13/2023    ANEUTAUTO 2.0 06/05/2023     06/05/2023      Lab Results   Component Value Date     06/05/2023    POTASSIUM 3.6 06/05/2023    MAG 2.6 (H) 04/28/2023    CR 1.36 (H) 06/05/2023    HARDEEP 8.6 06/05/2023    BILITOTAL 0.5 06/05/2023    ALBUMIN 4.3 06/05/2023    ALT 17 06/05/2023    AST 33 06/05/2023     Results reviewed, labs MET treatment parameters, ok to proceed with treatment.      Post Infusion Assessment:  Patient tolerated injection without incident to left abdomen.  Blood return noted pre and post infusion.  No evidence of extravasations.  Access discontinued per protocol.       Discharge Plan:   Patient declined prescription refills.  Copy of AVS reviewed with patient and/or family.  Patient will return 7/5 for next appointment- pt requested to move appointment to 6/30. msg sent to provider and scheduling- pt will watch for change and/ follow-up if not moved.  Patient discharged in stable condition accompanied by: self.  Departure Mode: Ambulatory.      Triny Dickerson RN

## 2023-06-06 DIAGNOSIS — C90.00 MULTIPLE MYELOMA NOT HAVING ACHIEVED REMISSION (H): Primary | ICD-10-CM

## 2023-06-06 LAB
ALBUMIN SERPL ELPH-MCNC: 3.6 G/DL (ref 3.7–5.1)
ALPHA1 GLOB SERPL ELPH-MCNC: 0.3 G/DL (ref 0.2–0.4)
ALPHA2 GLOB SERPL ELPH-MCNC: 0.5 G/DL (ref 0.5–0.9)
B-GLOBULIN SERPL ELPH-MCNC: 0.6 G/DL (ref 0.6–1)
GAMMA GLOB SERPL ELPH-MCNC: 0.5 G/DL (ref 0.7–1.6)
IGA SERPL-MCNC: 67 MG/DL (ref 84–499)
IGG SERPL-MCNC: 518 MG/DL (ref 610–1616)
IGM SERPL-MCNC: 44 MG/DL (ref 35–242)
KAPPA LC FREE SER-MCNC: 1.46 MG/DL (ref 0.33–1.94)
KAPPA LC FREE/LAMBDA FREE SER NEPH: 1.74 {RATIO} (ref 0.26–1.65)
LAMBDA LC FREE SERPL-MCNC: 0.84 MG/DL (ref 0.57–2.63)
M PROTEIN SERPL ELPH-MCNC: 0.1 G/DL
PROT PATTERN SERPL ELPH-IMP: ABNORMAL
PROT PATTERN SERPL IFE-IMP: NORMAL

## 2023-06-06 RX ORDER — LENALIDOMIDE 10 MG/1
10 CAPSULE ORAL DAILY
Qty: 28 CAPSULE | Refills: 0 | Status: SHIPPED | OUTPATIENT
Start: 2023-06-06 | End: 2023-08-23

## 2023-06-12 ENCOUNTER — LAB (OUTPATIENT)
Dept: LAB | Facility: CLINIC | Age: 53
End: 2023-06-12
Attending: INTERNAL MEDICINE
Payer: COMMERCIAL

## 2023-06-12 DIAGNOSIS — C90.00 MULTIPLE MYELOMA NOT HAVING ACHIEVED REMISSION (H): ICD-10-CM

## 2023-06-12 LAB
ALBUMIN SERPL BCG-MCNC: 4.4 G/DL (ref 3.5–5.2)
ALP SERPL-CCNC: 56 U/L (ref 40–129)
ALT SERPL W P-5'-P-CCNC: 21 U/L (ref 10–50)
ANION GAP SERPL CALCULATED.3IONS-SCNC: 9 MMOL/L (ref 7–15)
AST SERPL W P-5'-P-CCNC: 36 U/L (ref 10–50)
BASOPHILS # BLD AUTO: 0 10E3/UL (ref 0–0.2)
BASOPHILS NFR BLD AUTO: 1 %
BILIRUB SERPL-MCNC: 0.4 MG/DL
BUN SERPL-MCNC: 10.3 MG/DL (ref 6–20)
CALCIUM SERPL-MCNC: 9.2 MG/DL (ref 8.6–10)
CHLORIDE SERPL-SCNC: 107 MMOL/L (ref 98–107)
CREAT SERPL-MCNC: 1.27 MG/DL (ref 0.67–1.17)
DEPRECATED HCO3 PLAS-SCNC: 24 MMOL/L (ref 22–29)
EOSINOPHIL # BLD AUTO: 0.2 10E3/UL (ref 0–0.7)
EOSINOPHIL NFR BLD AUTO: 4 %
ERYTHROCYTE [DISTWIDTH] IN BLOOD BY AUTOMATED COUNT: 16.6 % (ref 10–15)
GFR SERPL CREATININE-BSD FRML MDRD: 67 ML/MIN/1.73M2
GLUCOSE SERPL-MCNC: 113 MG/DL (ref 70–99)
HCT VFR BLD AUTO: 33.5 % (ref 40–53)
HGB BLD-MCNC: 11.1 G/DL (ref 13.3–17.7)
IMM GRANULOCYTES # BLD: 0 10E3/UL
IMM GRANULOCYTES NFR BLD: 0 %
LYMPHOCYTES # BLD AUTO: 1.2 10E3/UL (ref 0.8–5.3)
LYMPHOCYTES NFR BLD AUTO: 30 %
MCH RBC QN AUTO: 29.6 PG (ref 26.5–33)
MCHC RBC AUTO-ENTMCNC: 33.1 G/DL (ref 31.5–36.5)
MCV RBC AUTO: 89 FL (ref 78–100)
MONOCYTES # BLD AUTO: 0.7 10E3/UL (ref 0–1.3)
MONOCYTES NFR BLD AUTO: 18 %
NEUTROPHILS # BLD AUTO: 2 10E3/UL (ref 1.6–8.3)
NEUTROPHILS NFR BLD AUTO: 47 %
NRBC # BLD AUTO: 0 10E3/UL
NRBC BLD AUTO-RTO: 0 /100
PLATELET # BLD AUTO: 325 10E3/UL (ref 150–450)
POTASSIUM SERPL-SCNC: 3.6 MMOL/L (ref 3.4–5.3)
PROT SERPL-MCNC: 6.6 G/DL (ref 6.4–8.3)
RBC # BLD AUTO: 3.75 10E6/UL (ref 4.4–5.9)
SODIUM SERPL-SCNC: 140 MMOL/L (ref 136–145)
TOTAL PROTEIN SERUM FOR ELP: 6.3 G/DL (ref 6.4–8.3)
WBC # BLD AUTO: 4.1 10E3/UL (ref 4–11)

## 2023-06-12 PROCEDURE — 85025 COMPLETE CBC W/AUTO DIFF WBC: CPT

## 2023-06-12 PROCEDURE — 80051 ELECTROLYTE PANEL: CPT

## 2023-06-12 PROCEDURE — 36415 COLL VENOUS BLD VENIPUNCTURE: CPT

## 2023-06-12 PROCEDURE — 82374 ASSAY BLOOD CARBON DIOXIDE: CPT

## 2023-06-12 PROCEDURE — 84155 ASSAY OF PROTEIN SERUM: CPT | Mod: 91

## 2023-06-12 PROCEDURE — 84165 PROTEIN E-PHORESIS SERUM: CPT | Mod: 26

## 2023-06-12 PROCEDURE — 86334 IMMUNOFIX E-PHORESIS SERUM: CPT | Performed by: PATHOLOGY

## 2023-06-12 PROCEDURE — 82784 ASSAY IGA/IGD/IGG/IGM EACH: CPT

## 2023-06-12 PROCEDURE — 84165 PROTEIN E-PHORESIS SERUM: CPT | Mod: TC | Performed by: PATHOLOGY

## 2023-06-12 PROCEDURE — 86334 IMMUNOFIX E-PHORESIS SERUM: CPT | Mod: 26

## 2023-06-12 PROCEDURE — 83521 IG LIGHT CHAINS FREE EACH: CPT

## 2023-06-12 NOTE — NURSING NOTE
Chief Complaint   Patient presents with     Blood Draw     Vpt blood draw by lab RN      Bibi Breaux, RN

## 2023-06-13 ENCOUNTER — TELEPHONE (OUTPATIENT)
Dept: ONCOLOGY | Facility: CLINIC | Age: 53
End: 2023-06-13
Payer: COMMERCIAL

## 2023-06-13 LAB
ALBUMIN SERPL ELPH-MCNC: 4.1 G/DL (ref 3.7–5.1)
ALPHA1 GLOB SERPL ELPH-MCNC: 0.3 G/DL (ref 0.2–0.4)
ALPHA2 GLOB SERPL ELPH-MCNC: 0.6 G/DL (ref 0.5–0.9)
B-GLOBULIN SERPL ELPH-MCNC: 0.7 G/DL (ref 0.6–1)
GAMMA GLOB SERPL ELPH-MCNC: 0.5 G/DL (ref 0.7–1.6)
IGA SERPL-MCNC: 67 MG/DL (ref 84–499)
IGG SERPL-MCNC: 550 MG/DL (ref 610–1616)
IGM SERPL-MCNC: 43 MG/DL (ref 35–242)
KAPPA LC FREE SER-MCNC: 1.58 MG/DL (ref 0.33–1.94)
KAPPA LC FREE/LAMBDA FREE SER NEPH: 2 {RATIO} (ref 0.26–1.65)
LAMBDA LC FREE SERPL-MCNC: 0.79 MG/DL (ref 0.57–2.63)
M PROTEIN SERPL ELPH-MCNC: 0.1 G/DL
PROT PATTERN SERPL ELPH-IMP: ABNORMAL
PROT PATTERN SERPL IFE-IMP: NORMAL

## 2023-06-13 NOTE — TELEPHONE ENCOUNTER
Oral Chemotherapy Monitoring Program   Medication: Revlimid  Rx: 10mg PO daily on days 1 through 28 of 28 day cycle   Auth #: [id number]   Risk Category: Adult Male  Routine survey questions reviewed.   Rx to be Escribed to [facility]    Yu Randallcloth  [facility]  Oncology Pharmacy Liaison   [email or web address]  [phone] (phone)  [phone] (fax)

## 2023-06-23 ENCOUNTER — LAB (OUTPATIENT)
Dept: LAB | Facility: CLINIC | Age: 53
End: 2023-06-23
Payer: COMMERCIAL

## 2023-06-23 ENCOUNTER — OFFICE VISIT (OUTPATIENT)
Dept: CARDIOLOGY | Facility: CLINIC | Age: 53
End: 2023-06-23
Payer: COMMERCIAL

## 2023-06-23 VITALS
OXYGEN SATURATION: 100 % | BODY MASS INDEX: 24.62 KG/M2 | SYSTOLIC BLOOD PRESSURE: 117 MMHG | DIASTOLIC BLOOD PRESSURE: 77 MMHG | HEART RATE: 65 BPM | WEIGHT: 153.2 LBS | HEIGHT: 66 IN

## 2023-06-23 DIAGNOSIS — I25.2 HISTORY OF ACUTE INFERIOR WALL MI: ICD-10-CM

## 2023-06-23 DIAGNOSIS — Z95.5 PRESENCE OF DRUG-ELUTING STENT IN LEFT CIRCUMFLEX CORONARY ARTERY: ICD-10-CM

## 2023-06-23 DIAGNOSIS — C90.01 MULTIPLE MYELOMA IN REMISSION (H): ICD-10-CM

## 2023-06-23 DIAGNOSIS — I10 BENIGN ESSENTIAL HYPERTENSION: ICD-10-CM

## 2023-06-23 DIAGNOSIS — N17.9 AKI (ACUTE KIDNEY INJURY) (H): ICD-10-CM

## 2023-06-23 DIAGNOSIS — C90.00 MULTIPLE MYELOMA NOT HAVING ACHIEVED REMISSION (H): ICD-10-CM

## 2023-06-23 DIAGNOSIS — I25.10 CORONARY ARTERY DISEASE INVOLVING NATIVE CORONARY ARTERY OF NATIVE HEART WITHOUT ANGINA PECTORIS: ICD-10-CM

## 2023-06-23 DIAGNOSIS — E78.5 HYPERLIPIDEMIA LDL GOAL <100: ICD-10-CM

## 2023-06-23 LAB
ALBUMIN SERPL BCG-MCNC: 4.3 G/DL (ref 3.5–5.2)
ALP SERPL-CCNC: 53 U/L (ref 40–129)
ALT SERPL W P-5'-P-CCNC: 17 U/L (ref 0–70)
ANION GAP SERPL CALCULATED.3IONS-SCNC: 11 MMOL/L (ref 7–15)
AST SERPL W P-5'-P-CCNC: 28 U/L (ref 0–45)
BASOPHILS # BLD AUTO: 0.1 10E3/UL (ref 0–0.2)
BASOPHILS NFR BLD AUTO: 1 %
BILIRUB SERPL-MCNC: 0.4 MG/DL
BUN SERPL-MCNC: 13.5 MG/DL (ref 6–20)
CALCIUM SERPL-MCNC: 8.9 MG/DL (ref 8.6–10)
CHLORIDE SERPL-SCNC: 107 MMOL/L (ref 98–107)
CHOLEST SERPL-MCNC: 116 MG/DL
CREAT SERPL-MCNC: 1.18 MG/DL (ref 0.67–1.17)
DEPRECATED HCO3 PLAS-SCNC: 22 MMOL/L (ref 22–29)
EOSINOPHIL # BLD AUTO: 0.5 10E3/UL (ref 0–0.7)
EOSINOPHIL NFR BLD AUTO: 14 %
ERYTHROCYTE [DISTWIDTH] IN BLOOD BY AUTOMATED COUNT: 16.8 % (ref 10–15)
GFR SERPL CREATININE-BSD FRML MDRD: 73 ML/MIN/1.73M2
GLUCOSE SERPL-MCNC: 101 MG/DL (ref 70–99)
HCT VFR BLD AUTO: 37 % (ref 40–53)
HDLC SERPL-MCNC: 40 MG/DL
HGB BLD-MCNC: 11.8 G/DL (ref 13.3–17.7)
IMM GRANULOCYTES # BLD: 0 10E3/UL
IMM GRANULOCYTES NFR BLD: 0 %
LDLC SERPL CALC-MCNC: 56 MG/DL
LYMPHOCYTES # BLD AUTO: 1.3 10E3/UL (ref 0.8–5.3)
LYMPHOCYTES NFR BLD AUTO: 33 %
MCH RBC QN AUTO: 30.1 PG (ref 26.5–33)
MCHC RBC AUTO-ENTMCNC: 31.9 G/DL (ref 31.5–36.5)
MCV RBC AUTO: 94 FL (ref 78–100)
MONOCYTES # BLD AUTO: 0.5 10E3/UL (ref 0–1.3)
MONOCYTES NFR BLD AUTO: 12 %
NEUTROPHILS # BLD AUTO: 1.5 10E3/UL (ref 1.6–8.3)
NEUTROPHILS NFR BLD AUTO: 40 %
NONHDLC SERPL-MCNC: 76 MG/DL
NRBC # BLD AUTO: 0 10E3/UL
NRBC BLD AUTO-RTO: 0 /100
PLATELET # BLD AUTO: 367 10E3/UL (ref 150–450)
POTASSIUM SERPL-SCNC: 4.2 MMOL/L (ref 3.4–5.3)
PROT SERPL-MCNC: 6.3 G/DL (ref 6.4–8.3)
RBC # BLD AUTO: 3.92 10E6/UL (ref 4.4–5.9)
SODIUM SERPL-SCNC: 140 MMOL/L (ref 136–145)
TRIGL SERPL-MCNC: 100 MG/DL
WBC # BLD AUTO: 3.8 10E3/UL (ref 4–11)

## 2023-06-23 PROCEDURE — 99214 OFFICE O/P EST MOD 30 MIN: CPT | Performed by: INTERNAL MEDICINE

## 2023-06-23 PROCEDURE — 36415 COLL VENOUS BLD VENIPUNCTURE: CPT | Performed by: INTERNAL MEDICINE

## 2023-06-23 PROCEDURE — 80053 COMPREHEN METABOLIC PANEL: CPT | Performed by: INTERNAL MEDICINE

## 2023-06-23 PROCEDURE — 80061 LIPID PANEL: CPT | Performed by: INTERNAL MEDICINE

## 2023-06-23 PROCEDURE — 85025 COMPLETE CBC W/AUTO DIFF WBC: CPT | Performed by: INTERNAL MEDICINE

## 2023-06-23 NOTE — PROGRESS NOTES
HPI and Plan:   It is a pleasure for me to see Mr. Ronquillo for followup of coronary artery disease.      In 2004, he presented to [facility] with an acute inferoposterior STEMI. The culprit was a 90% mid-circumflex stenosis, which was successfully revascularized with placement of a drug-eluting stent. He has normal left ventricular systolic function.     His cardiac risk factor control has been average over the years.  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  He was overweight and had impaired glucose tolerance and borderline suboptimal blood pressure control.     2021 he was unfortunately diagnosed with multiple myeloma.  I am very happy to hear that he has responded very well to chemotherapy and at the end of last year he underwent a stem cell transplant without incident.    When I saw him last year he was doing well.  His cardiac condition continues to be stable but he was admitted at least once with neutropenic sepsis and pneumonia.  I am happy to see that he is recovering nicely.    Looked at his labs and he does have stage II chronic renal failure as well as mild anemia.    He has no symptoms of angina.  All cardiac medications are well-tolerated.    His cardiac examination is unremarkable.    He has lost about 20 pounds in the time since he was diagnosed with multiple myeloma.  His blood pressure is around 120s systolic and readings are about the same at home.  As such I do not think he needs to be on amlodipine anymore.  Indicates he is just on 2.5 mg.  I will stop this today.    His lipid profile has not been checked in a while and I suspect to be even better now with his weight loss.  We will check it today and adjust his lipid medications as necessary.  Otherwise I look forward to seeing him again in 1 years time for continued follow-up.    Orders Placed This Encounter   Procedures     Lipid Profile     Follow-Up with Cardiology       No orders of the defined types were  placed in this encounter.      Encounter Diagnoses   Name Primary?     Presence of drug-eluting stent in left circumflex coronary artery      History of acute inferior wall MI      Benign essential hypertension      Coronary artery disease involving native coronary artery of native heart without angina pectoris      Hyperlipidemia LDL goal <100        CURRENT MEDICATIONS:  Current Outpatient Medications   Medication Sig Dispense Refill     acetaminophen (TYLENOL) 500 MG tablet Take 500-1,000 mg by mouth every 6 hours as needed for mild pain       acyclovir (ZOVIRAX) 400 MG tablet Take 2 tablets (800 mg) by mouth 2 times daily 60 tablet 3     aspirin (ASA) 81 MG EC tablet Take 1 tablet (81 mg) by mouth daily       calcium carbonate-vitamin D (OSCAL) 500-5 MG-MCG tablet TAKE ONE TABLET BY MOUTH ONCE DAILY 90 tablet 3     LENalidomide (REVLIMID) 10 MG CAPS capsule Take 1 capsule (10 mg) by mouth daily 28 capsule 0     levothyroxine (SYNTHROID/LEVOTHROID) 125 MCG tablet Take 1 tablet (125 mcg) by mouth daily 90 tablet 2     Lidocaine (LIDOCARE) 4 % Patch Place 2 patches onto the skin every 24 hours To prevent lidocaine toxicity, patient should be patch free for 12 hrs daily. 30 patch 0     losartan (COZAAR) 50 MG tablet Take 1 tablet (50 mg) by mouth daily 90 tablet 3     potassium chloride ER (KLOR-CON M) 10 MEQ CR tablet Take 2 tablets (20 mEq) by mouth daily 60 tablet 3     rosuvastatin (CRESTOR) 40 MG tablet Take 1 tablet (40 mg) by mouth daily 90 tablet 3     vitamin B complex with vitamin C (STRESS TAB) tablet Take 1 tablet by mouth daily 90 tablet 3     LENalidomide (REVLIMID) 10 MG CAPS capsule Take 1 capsule (10 mg) by mouth daily 28 capsule 0       ALLERGIES     Allergies   Allergen Reactions     Blood Transfusion Related (Informational Only) Other (See Comments)     Patient has a history of a clinically significant antibody against RBC antigens.  A delay in compatible RBCs may occur.        PAST MEDICAL  HISTORY:  Past Medical History:   Diagnosis Date     CAD (coronary artery disease)     s/p stent to CFX     Heart attack (H)      Hyperlipidemia LDL goal <100      Hypertension      Stented coronary artery      Thyroid disease        PAST SURGICAL HISTORY:  Past Surgical History:   Procedure Laterality Date     BONE MARROW BIOPSY, BONE SPECIMEN, NEEDLE/TROCAR Left 5/17/2021    Procedure: BIOPSY, BONE MARROW with aspiration and ancillary studies;  Surgeon: Niharika Regalado MD;  Location: [facility]     BONE MARROW BIOPSY, BONE SPECIMEN, NEEDLE/TROCAR Left 10/14/2021    Procedure: BIOPSY, BONE MARROW;  Surgeon: Alexa Albert APRN CNP;  Location: [facility] OR     BONE MARROW BIOPSY, BONE SPECIMEN, NEEDLE/TROCAR Right 3/7/2022    Procedure: BIOPSY, BONE MARROW;  Surgeon: Deborah Carmona PA-C;  Location: [facility] OR     BONE MARROW BIOPSY, BONE SPECIMEN, NEEDLE/TROCAR N/A 5/9/2022    Procedure: BIOPSY, BONE MARROW;  Surgeon: Deborah Leblanc PA-C;  Location: [facility] OR     BONE MARROW BIOPSY, BONE SPECIMEN, NEEDLE/TROCAR Left 12/5/2022    Procedure: BIOPSY, BONE MARROW;  Surgeon: Alba Moses APRN CNP;  Location: [facility] OR     HEART CATH PTCA SINGLE VESSEL  10/10/2004    Stent to [facility] [employer]      INSERT CATHETER VASCULAR ACCESS Right 11/3/2021    Procedure: NON VALVED TUNNELED DUAL LUMEN APHARESIS CAPABLE LINE INSERTION, VASCULAR ACCESS CATHETER;  Surgeon: Werner Mcnamara MD;  Location: [facility] OR     IR CVC TUNNEL PLACEMENT > 5 YRS OF AGE  11/3/2021     IR CVC TUNNEL REMOVAL RIGHT  12/2/2021     THYROIDECTOMY N/A 5/3/2022    Procedure: total thyroidectomy, Left selective neck dissection level 6 and level 7 ;  Surgeon: Clarita Sepulveda MD;  Location: [facility] OR       FAMILY HISTORY:  Family History   Problem Relation Age of Onset     Hypertension Mother      Hyperlipidemia Mother      Heart Disease Paternal Grandmother      Hyperlipidemia Sister      Hyperlipidemia Sister        SOCIAL HISTORY:  Social  "History     Socioeconomic History     Marital status:      Spouse name: None     Number of children: None     Years of education: None     Highest education level: None   Tobacco Use     Smoking status: Never     Smokeless tobacco: Never   Substance and Sexual Activity     Alcohol use: Not Currently     Drug use: No     Sexual activity: Yes     Partners: Female   Other Topics Concern     Parent/sibling w/ CABG, MI or angioplasty before 65F 55M? No     Caffeine Concern No     Special Diet No     Exercise No     Seat Belt Yes     Social Determinants of Health     Intimate Partner Violence: Not At Risk (12/8/2022)    Humiliation, Afraid, Rape, and Kick questionnaire      Fear of Current or Ex-Partner: No      Emotionally Abused: No      Physically Abused: No      Sexually Abused: No       Review of Systems:  Skin:  not assessed     Eyes:  not assessed    ENT:  not assessed    Respiratory:  Negative    Cardiovascular:  Negative    Gastroenterology:      Genitourinary:  not assessed    Musculoskeletal:  not assessed    Neurologic:  not assessed    Psychiatric:  not assessed    Heme/Lymph/Imm:  not assessed    Endocrine:  Positive for thyroid disorder    Physical Exam:  Vitals: /77 (BP Location: Left arm, Patient Position: Sitting, Cuff Size: Adult Regular)   Pulse 65   Ht 1.676 m (5' 6\")   Wt 69.5 kg (153 lb 3.2 oz)   SpO2 100%   BMI 24.73 kg/m      Constitutional:  cooperative, alert and oriented, well developed, well nourished, in no acute distress        Skin:  warm and dry to the touch, no apparent skin lesions or masses noted          Head:  normocephalic, no masses or lesions        Eyes:  pupils equal and round, conjunctivae and lids unremarkable, sclera white, no xanthalasma, EOMS intact, no nystagmus        Lymph:No Cervical lymphadenopathy present     ENT:  no pallor or cyanosis, dentition good        Neck:  carotid pulses are full and equal bilaterally, JVP normal, no carotid bruit    "     Respiratory:  normal breath sounds, clear to auscultation, normal A-P diameter, normal symmetry, normal respiratory excursion, no use of accessory muscles         Cardiac: regular rhythm, normal S1/S2, no S3 or S4, apical impulse not displaced, no murmurs, gallops or rubs                pulses full and equal, no bruits auscultated                                        GI:  abdomen soft, non-tender, BS normoactive, no mass, no HSM, no bruits        Extremities and Muscular Skeletal:  no deformities, clubbing, cyanosis, erythema observed              Neurological:  no gross motor deficits        Psych:  Alert and Oriented x 3        Recent Lab Results:  LIPID RESULTS:  Lab Results   Component Value Date    CHOL 163 10/13/2021    CHOL 154 01/20/2021    HDL 43 10/13/2021    HDL 41 01/20/2021    LDL 93 10/13/2021    LDL 65 01/20/2021    TRIG 134 10/13/2021    TRIG 241 (H) 01/20/2021    CHOLHDLRATIO 4.2 06/19/2015       LIVER ENZYME RESULTS:  Lab Results   Component Value Date    AST 36 06/12/2023    AST 23 07/09/2021    ALT 21 06/12/2023    ALT 25 07/09/2021       CBC RESULTS:  Lab Results   Component Value Date    WBC 4.1 06/12/2023    WBC 9.6 07/09/2021    RBC 3.75 (L) 06/12/2023    RBC 3.62 (L) 07/09/2021    HGB 11.1 (L) 06/12/2023    HGB 10.9 (L) 07/09/2021    HCT 33.5 (L) 06/12/2023    HCT 32.1 (L) 07/09/2021    MCV 89 06/12/2023    MCV 89 07/09/2021    MCH 29.6 06/12/2023    MCH 30.1 07/09/2021    MCHC 33.1 06/12/2023    MCHC 34.0 07/09/2021    RDW 16.6 (H) 06/12/2023    RDW 17.0 (H) 07/09/2021     06/12/2023     (H) 07/09/2021       BMP RESULTS:  Lab Results   Component Value Date     06/12/2023     07/09/2021    POTASSIUM 3.6 06/12/2023    POTASSIUM 3.8 08/16/2022    POTASSIUM 3.7 07/09/2021    CHLORIDE 107 06/12/2023    CHLORIDE 108 08/16/2022    CHLORIDE 107 07/09/2021    CO2 24 06/12/2023    CO2 22 08/16/2022    CO2 24 07/09/2021    ANIONGAP 9 06/12/2023    ANIONGAP 8 08/16/2022     ANIONGAP 5 07/09/2021     (H) 06/12/2023     (H) 08/16/2022    GLC 91 07/09/2021    BUN 10.3 06/12/2023    BUN 10 08/16/2022    BUN 8 07/09/2021    CR 1.27 (H) 06/12/2023    CR 0.87 07/09/2021    GFRESTIMATED 67 06/12/2023    GFRESTIMATED >90 07/09/2021    GFRESTBLACK >90 07/09/2021    HARDEEP 9.2 06/12/2023    HARDEEP 8.4 (L) 07/09/2021        A1C RESULTS:  Lab Results   Component Value Date    A1C 6.2 (H) 01/16/2023    A1C 6.0 (H) 01/25/2021       INR RESULTS:  Lab Results   Component Value Date    INR 0.98 12/05/2022    INR 0.89 05/09/2022           CC  Dominguez Craig MD  [address]  [place] [id number]

## 2023-06-23 NOTE — LETTER
6/23/2023    Selma Johnson, APRN CNP  [address]  [facility] [address]    RE: June Ronquillo       Dear Colleague,     I had the pleasure of seeing June Ronquillo in the [facility].  HPI and Plan:   It is a pleasure for me to see Mr. Ronquillo for followup of coronary artery disease.      In 2004, he presented to [facility] with an acute inferoposterior STEMI. The culprit was a 90% mid-circumflex stenosis, which was successfully revascularized with placement of a drug-eluting stent. He has normal left ventricular systolic function.     His cardiac risk factor control has been average over the years.  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  He was overweight and had impaired glucose tolerance and borderline suboptimal blood pressure control.     2021 he was unfortunately diagnosed with multiple myeloma.  I am very happy to hear that he has responded very well to chemotherapy and at the end of last year he underwent a stem cell transplant without incident.    When I saw him last year he was doing well.  His cardiac condition continues to be stable but he was admitted at least once with neutropenic sepsis and pneumonia.  I am happy to see that he is recovering nicely.    Looked at his labs and he does have stage II chronic renal failure as well as mild anemia.    He has no symptoms of angina.  All cardiac medications are well-tolerated.    His cardiac examination is unremarkable.    He has lost about 20 pounds in the time since he was diagnosed with multiple myeloma.  His blood pressure is around 120s systolic and readings are about the same at home.  As such I do not think he needs to be on amlodipine anymore.  Indicates he is just on 2.5 mg.  I will stop this today.    His lipid profile has not been checked in a while and I suspect to be even better now with his weight loss.  We will check it today and adjust his lipid medications as  necessary.  Otherwise I look forward to seeing him again in 1 years time for continued follow-up.    Orders Placed This Encounter   Procedures    Lipid Profile    Follow-Up with Cardiology       No orders of the defined types were placed in this encounter.      Encounter Diagnoses   Name Primary?    Presence of drug-eluting stent in left circumflex coronary artery     History of acute inferior wall MI     Benign essential hypertension     Coronary artery disease involving native coronary artery of native heart without angina pectoris     Hyperlipidemia LDL goal <100        CURRENT MEDICATIONS:  Current Outpatient Medications   Medication Sig Dispense Refill    acetaminophen (TYLENOL) 500 MG tablet Take 500-1,000 mg by mouth every 6 hours as needed for mild pain      acyclovir (ZOVIRAX) 400 MG tablet Take 2 tablets (800 mg) by mouth 2 times daily 60 tablet 3    aspirin (ASA) 81 MG EC tablet Take 1 tablet (81 mg) by mouth daily      calcium carbonate-vitamin D (OSCAL) 500-5 MG-MCG tablet TAKE ONE TABLET BY MOUTH ONCE DAILY 90 tablet 3    LENalidomide (REVLIMID) 10 MG CAPS capsule Take 1 capsule (10 mg) by mouth daily 28 capsule 0    levothyroxine (SYNTHROID/LEVOTHROID) 125 MCG tablet Take 1 tablet (125 mcg) by mouth daily 90 tablet 2    Lidocaine (LIDOCARE) 4 % Patch Place 2 patches onto the skin every 24 hours To prevent lidocaine toxicity, patient should be patch free for 12 hrs daily. 30 patch 0    losartan (COZAAR) 50 MG tablet Take 1 tablet (50 mg) by mouth daily 90 tablet 3    potassium chloride ER (KLOR-CON M) 10 MEQ CR tablet Take 2 tablets (20 mEq) by mouth daily 60 tablet 3    rosuvastatin (CRESTOR) 40 MG tablet Take 1 tablet (40 mg) by mouth daily 90 tablet 3    vitamin B complex with vitamin C (STRESS TAB) tablet Take 1 tablet by mouth daily 90 tablet 3    LENalidomide (REVLIMID) 10 MG CAPS capsule Take 1 capsule (10 mg) by mouth daily 28 capsule 0       ALLERGIES     Allergies   Allergen Reactions     Blood Transfusion Related (Informational Only) Other (See Comments)     Patient has a history of a clinically significant antibody against RBC antigens.  A delay in compatible RBCs may occur.        PAST MEDICAL HISTORY:  Past Medical History:   Diagnosis Date    CAD (coronary artery disease)     s/p stent to CFX    Heart attack (H)     Hyperlipidemia LDL goal <100     Hypertension     Stented coronary artery     Thyroid disease        PAST SURGICAL HISTORY:  Past Surgical History:   Procedure Laterality Date    BONE MARROW BIOPSY, BONE SPECIMEN, NEEDLE/TROCAR Left 5/17/2021    Procedure: BIOPSY, BONE MARROW with aspiration and ancillary studies;  Surgeon: Niharika Regalado MD;  Location: [facility] OR    BONE MARROW BIOPSY, BONE SPECIMEN, NEEDLE/TROCAR Left 10/14/2021    Procedure: BIOPSY, BONE MARROW;  Surgeon: Alexa Albert APRN CNP;  Location: [facility] OR    BONE MARROW BIOPSY, BONE SPECIMEN, NEEDLE/TROCAR Right 3/7/2022    Procedure: BIOPSY, BONE MARROW;  Surgeon: Deborah Carmona PA-C;  Location: [facility] OR    BONE MARROW BIOPSY, BONE SPECIMEN, NEEDLE/TROCAR N/A 5/9/2022    Procedure: BIOPSY, BONE MARROW;  Surgeon: Deborah Leblanc PA-C;  Location: [facility] OR    BONE MARROW BIOPSY, BONE SPECIMEN, NEEDLE/TROCAR Left 12/5/2022    Procedure: BIOPSY, BONE MARROW;  Surgeon: Alba Moses APRN CNP;  Location: [facility] OR    HEART CATH PTCA SINGLE VESSEL  10/10/2004    Stent to [facility] [employer]     INSERT CATHETER VASCULAR ACCESS Right 11/3/2021    Procedure: NON VALVED TUNNELED DUAL LUMEN APHARESIS CAPABLE LINE INSERTION, VASCULAR ACCESS CATHETER;  Surgeon: Werner Mcnamara MD;  Location: [facility] OR    IR CVC TUNNEL PLACEMENT > 5 YRS OF AGE  11/3/2021    IR CVC TUNNEL REMOVAL RIGHT  12/2/2021    THYROIDECTOMY N/A 5/3/2022    Procedure: total thyroidectomy, Left selective neck dissection level 6 and level 7 ;  Surgeon: Clarita Sepulveda MD;  Location: [facility] OR       FAMILY HISTORY:  Family History   Problem  "Relation Age of Onset    Hypertension Mother     Hyperlipidemia Mother     Heart Disease Paternal Grandmother     Hyperlipidemia Sister     Hyperlipidemia Sister        SOCIAL HISTORY:  Social History     Socioeconomic History    Marital status:      Spouse name: None    Number of children: None    Years of education: None    Highest education level: None   Tobacco Use    Smoking status: Never    Smokeless tobacco: Never   Substance and Sexual Activity    Alcohol use: Not Currently    Drug use: No    Sexual activity: Yes     Partners: Female   Other Topics Concern    Parent/sibling w/ CABG, MI or angioplasty before 65F 55M? No    Caffeine Concern No    Special Diet No    Exercise No    Seat Belt Yes     Social Determinants of Health     Intimate Partner Violence: Not At Risk (12/8/2022)    Humiliation, Afraid, Rape, and Kick questionnaire     Fear of Current or Ex-Partner: No     Emotionally Abused: No     Physically Abused: No     Sexually Abused: No       Review of Systems:  Skin:  not assessed     Eyes:  not assessed    ENT:  not assessed    Respiratory:  Negative    Cardiovascular:  Negative    Gastroenterology:      Genitourinary:  not assessed    Musculoskeletal:  not assessed    Neurologic:  not assessed    Psychiatric:  not assessed    Heme/Lymph/Imm:  not assessed    Endocrine:  Positive for thyroid disorder    Physical Exam:  Vitals: /77 (BP Location: Left arm, Patient Position: Sitting, Cuff Size: Adult Regular)   Pulse 65   Ht 1.676 m (5' 6\")   Wt 69.5 kg (153 lb 3.2 oz)   SpO2 100%   BMI 24.73 kg/m      Constitutional:  cooperative, alert and oriented, well developed, well nourished, in no acute distress        Skin:  warm and dry to the touch, no apparent skin lesions or masses noted          Head:  normocephalic, no masses or lesions        Eyes:  pupils equal and round, conjunctivae and lids unremarkable, sclera white, no xanthalasma, EOMS intact, no nystagmus        Lymph:No " Cervical lymphadenopathy present     ENT:  no pallor or cyanosis, dentition good        Neck:  carotid pulses are full and equal bilaterally, JVP normal, no carotid bruit        Respiratory:  normal breath sounds, clear to auscultation, normal A-P diameter, normal symmetry, normal respiratory excursion, no use of accessory muscles         Cardiac: regular rhythm, normal S1/S2, no S3 or S4, apical impulse not displaced, no murmurs, gallops or rubs                pulses full and equal, no bruits auscultated                                        GI:  abdomen soft, non-tender, BS normoactive, no mass, no HSM, no bruits        Extremities and Muscular Skeletal:  no deformities, clubbing, cyanosis, erythema observed              Neurological:  no gross motor deficits        Psych:  Alert and Oriented x 3        Recent Lab Results:  LIPID RESULTS:  Lab Results   Component Value Date    CHOL 163 10/13/2021    CHOL 154 01/20/2021    HDL 43 10/13/2021    HDL 41 01/20/2021    LDL 93 10/13/2021    LDL 65 01/20/2021    TRIG 134 10/13/2021    TRIG 241 (H) 01/20/2021    CHOLHDLRATIO 4.2 06/19/2015       LIVER ENZYME RESULTS:  Lab Results   Component Value Date    AST 36 06/12/2023    AST 23 07/09/2021    ALT 21 06/12/2023    ALT 25 07/09/2021       CBC RESULTS:  Lab Results   Component Value Date    WBC 4.1 06/12/2023    WBC 9.6 07/09/2021    RBC 3.75 (L) 06/12/2023    RBC 3.62 (L) 07/09/2021    HGB 11.1 (L) 06/12/2023    HGB 10.9 (L) 07/09/2021    HCT 33.5 (L) 06/12/2023    HCT 32.1 (L) 07/09/2021    MCV 89 06/12/2023    MCV 89 07/09/2021    MCH 29.6 06/12/2023    MCH 30.1 07/09/2021    MCHC 33.1 06/12/2023    MCHC 34.0 07/09/2021    RDW 16.6 (H) 06/12/2023    RDW 17.0 (H) 07/09/2021     06/12/2023     (H) 07/09/2021       BMP RESULTS:  Lab Results   Component Value Date     06/12/2023     07/09/2021    POTASSIUM 3.6 06/12/2023    POTASSIUM 3.8 08/16/2022    POTASSIUM 3.7 07/09/2021    CHLORIDE 107  06/12/2023    CHLORIDE 108 08/16/2022    CHLORIDE 107 07/09/2021    CO2 24 06/12/2023    CO2 22 08/16/2022    CO2 24 07/09/2021    ANIONGAP 9 06/12/2023    ANIONGAP 8 08/16/2022    ANIONGAP 5 07/09/2021     (H) 06/12/2023     (H) 08/16/2022    GLC 91 07/09/2021    BUN 10.3 06/12/2023    BUN 10 08/16/2022    BUN 8 07/09/2021    CR 1.27 (H) 06/12/2023    CR 0.87 07/09/2021    GFRESTIMATED 67 06/12/2023    GFRESTIMATED >90 07/09/2021    GFRESTBLACK >90 07/09/2021    HARDEEP 9.2 06/12/2023    HARDEEP 8.4 (L) 07/09/2021        A1C RESULTS:  Lab Results   Component Value Date    A1C 6.2 (H) 01/16/2023    A1C 6.0 (H) 01/25/2021       INR RESULTS:  Lab Results   Component Value Date    INR 0.98 12/05/2022    INR 0.89 05/09/2022           CC  Graciela Craig MD  [address]  [place] [address]                    Thank you for allowing me to participate in the care of your patient.      Sincerely,     DR GRACIELA CRAIG MD     [facility]

## 2023-06-26 ENCOUNTER — LAB (OUTPATIENT)
Dept: LAB | Facility: CLINIC | Age: 53
End: 2023-06-26
Payer: COMMERCIAL

## 2023-06-26 ENCOUNTER — OFFICE VISIT (OUTPATIENT)
Dept: NEPHROLOGY | Facility: CLINIC | Age: 53
End: 2023-06-26
Attending: INTERNAL MEDICINE
Payer: COMMERCIAL

## 2023-06-26 VITALS
DIASTOLIC BLOOD PRESSURE: 75 MMHG | HEART RATE: 68 BPM | OXYGEN SATURATION: 100 % | BODY MASS INDEX: 25.07 KG/M2 | WEIGHT: 155.3 LBS | SYSTOLIC BLOOD PRESSURE: 122 MMHG | TEMPERATURE: 98.4 F

## 2023-06-26 DIAGNOSIS — R53.83 FATIGUE, UNSPECIFIED TYPE: ICD-10-CM

## 2023-06-26 DIAGNOSIS — C90.00 MULTIPLE MYELOMA NOT HAVING ACHIEVED REMISSION (H): ICD-10-CM

## 2023-06-26 DIAGNOSIS — R73.03 PRE-DIABETES: ICD-10-CM

## 2023-06-26 DIAGNOSIS — E06.3 HYPOTHYROIDISM DUE TO HASHIMOTO'S THYROIDITIS: ICD-10-CM

## 2023-06-26 DIAGNOSIS — C73 PAPILLARY MICROCARCINOMA OF THYROID (H): ICD-10-CM

## 2023-06-26 DIAGNOSIS — N17.9 AKI (ACUTE KIDNEY INJURY) (H): ICD-10-CM

## 2023-06-26 LAB
ALBUMIN MFR UR ELPH: <6 MG/DL
ALBUMIN SERPL BCG-MCNC: 4.4 G/DL (ref 3.5–5.2)
ALBUMIN UR-MCNC: NEGATIVE MG/DL
ALP SERPL-CCNC: 54 U/L (ref 40–129)
ALT SERPL W P-5'-P-CCNC: 17 U/L (ref 0–70)
ANION GAP SERPL CALCULATED.3IONS-SCNC: 9 MMOL/L (ref 7–15)
APPEARANCE UR: CLEAR
AST SERPL W P-5'-P-CCNC: 30 U/L (ref 0–45)
BILIRUB SERPL-MCNC: 0.4 MG/DL
BILIRUB UR QL STRIP: NEGATIVE
BUN SERPL-MCNC: 13.2 MG/DL (ref 6–20)
CALCIUM SERPL-MCNC: 8.6 MG/DL (ref 8.6–10)
CHLORIDE SERPL-SCNC: 106 MMOL/L (ref 98–107)
COLOR UR AUTO: NORMAL
CREAT SERPL-MCNC: 1.27 MG/DL (ref 0.67–1.17)
CREAT UR-MCNC: 30.9 MG/DL
CREAT UR-MCNC: 31.4 MG/DL
DEPRECATED HCO3 PLAS-SCNC: 23 MMOL/L (ref 22–29)
FSH SERPL IRP2-ACNC: 13.6 MIU/ML (ref 1.5–12.4)
GFR SERPL CREATININE-BSD FRML MDRD: 67 ML/MIN/1.73M2
GLUCOSE SERPL-MCNC: 108 MG/DL (ref 70–99)
GLUCOSE UR STRIP-MCNC: NEGATIVE MG/DL
HBA1C MFR BLD: 5.5 %
HGB UR QL STRIP: NEGATIVE
KETONES UR STRIP-MCNC: NEGATIVE MG/DL
LEUKOCYTE ESTERASE UR QL STRIP: NEGATIVE
LH SERPL-ACNC: 9.9 MIU/ML (ref 1.7–8.6)
MICROALBUMIN UR-MCNC: <12 MG/L
MICROALBUMIN/CREAT UR: NORMAL MG/G{CREAT}
NITRATE UR QL: NEGATIVE
PH UR STRIP: 6 [PH] (ref 5–7)
POTASSIUM SERPL-SCNC: 3.9 MMOL/L (ref 3.4–5.3)
PROT SERPL-MCNC: 6.3 G/DL (ref 6.4–8.3)
PROT/CREAT 24H UR: NORMAL MG/G{CREAT}
SHBG SERPL-SCNC: 23 NMOL/L (ref 11–80)
SODIUM SERPL-SCNC: 138 MMOL/L (ref 136–145)
SP GR UR STRIP: 1 (ref 1–1.03)
TSH SERPL DL<=0.005 MIU/L-ACNC: 0.58 UIU/ML (ref 0.3–4.2)
UROBILINOGEN UR STRIP-MCNC: NORMAL MG/DL

## 2023-06-26 PROCEDURE — 84403 ASSAY OF TOTAL TESTOSTERONE: CPT | Performed by: INTERNAL MEDICINE

## 2023-06-26 PROCEDURE — 81003 URINALYSIS AUTO W/O SCOPE: CPT | Performed by: PATHOLOGY

## 2023-06-26 PROCEDURE — 86800 THYROGLOBULIN ANTIBODY: CPT | Performed by: INTERNAL MEDICINE

## 2023-06-26 PROCEDURE — 84432 ASSAY OF THYROGLOBULIN: CPT | Performed by: INTERNAL MEDICINE

## 2023-06-26 PROCEDURE — 84270 ASSAY OF SEX HORMONE GLOBUL: CPT | Performed by: INTERNAL MEDICINE

## 2023-06-26 PROCEDURE — 80053 COMPREHEN METABOLIC PANEL: CPT | Performed by: PATHOLOGY

## 2023-06-26 PROCEDURE — 82570 ASSAY OF URINE CREATININE: CPT | Performed by: INTERNAL MEDICINE

## 2023-06-26 PROCEDURE — 83002 ASSAY OF GONADOTROPIN (LH): CPT | Performed by: INTERNAL MEDICINE

## 2023-06-26 PROCEDURE — 84156 ASSAY OF PROTEIN URINE: CPT | Performed by: PATHOLOGY

## 2023-06-26 PROCEDURE — 99214 OFFICE O/P EST MOD 30 MIN: CPT | Mod: GC | Performed by: INTERNAL MEDICINE

## 2023-06-26 PROCEDURE — 84443 ASSAY THYROID STIM HORMONE: CPT | Performed by: PATHOLOGY

## 2023-06-26 PROCEDURE — 99000 SPECIMEN HANDLING OFFICE-LAB: CPT | Performed by: PATHOLOGY

## 2023-06-26 PROCEDURE — 83001 ASSAY OF GONADOTROPIN (FSH): CPT | Performed by: INTERNAL MEDICINE

## 2023-06-26 PROCEDURE — 83036 HEMOGLOBIN GLYCOSYLATED A1C: CPT | Performed by: INTERNAL MEDICINE

## 2023-06-26 PROCEDURE — 36415 COLL VENOUS BLD VENIPUNCTURE: CPT | Performed by: PATHOLOGY

## 2023-06-26 PROCEDURE — G0463 HOSPITAL OUTPT CLINIC VISIT: HCPCS | Performed by: INTERNAL MEDICINE

## 2023-06-26 RX ORDER — ACYCLOVIR 400 MG/1
400 TABLET ORAL 2 TIMES DAILY
Qty: 60 TABLET | Refills: 3 | COMMUNITY
Start: 2023-06-26

## 2023-06-26 ASSESSMENT — PAIN SCALES - GENERAL: PAINLEVEL: NO PAIN (0)

## 2023-06-26 NOTE — PROGRESS NOTES
Nephrology Clinic    June Ronquillo MRN:[id number] YOB: 1970  Date of Service: 06/26/2023  Primary care provider: Selma Johnson  Requesting physician: Laura Ballesteros APRN CNP      REASON FOR CONSULT: Acute kidney Injury    HISTORY OF PRESENT ILLNESS:   June Ronquillo is a 52 year old male who presents for evaluation of acute kidney injury.  The past medical history is significant for hypertension diagnosed 5 years ago, hypothyroidism s/p thyroidectomy on levothyroxine and multiple myeloma diagnosed in April 2021.    The history of his myeloma is the following:  - 4/30/2021 M spike of 34.8 in urine.M spike in blood 0.2; IgG 702 with depressed IgA and IgM. Beta 2 microglobulin 2.7. Lambda  with K/L ratio of 0.01. WBC 11.1 with absolute eos of 1.0. hgb, plt, Cr (1.1), and albumin WNL.     -4/30/2021 CT abd/pelvis showed sclerotic marrow changes with numerous lytic appearing lesions throughout the imaged portions of the spine, pelvis and ribs.      -PET scan 5/11/2021 Numerous osteolytic lesions which predominantly demonstrate uptake below liver background levels. There is one intraosseous lesion in the left lateral 9th rib that demonstrates uptake above background, possibly due to nondisplaced fracture. Adjacent to this lesion is mild  hypermetabolism to the left pleura, with new small left pleural effusion, suspicious for malignant effusion versus posttraumatic effusion. Pleural uptake may represent extramedullary myeloma extending along the intercostal space versus inflammation.     - BM bx 5/17/2021 with flow showing 4.0% plasma cells which express CD19 (dim), CD38 (bright), CD45 (dim), , and monotypic cytoplasmic lambda immunoglobulin light chains but lack CD20 and CD56.  Hypercellular bone marrow for age (approximately 75% cellularity) with adequate trilineage hematopoiesis. Plasma cell infiltrate: Interstitial, lambda monotypic and representing approximately 60% the  bone marrow cellularity, by  immunohistochemical stain. Cytpgenetics with 2 related hypodiploid clones. One with loss of Y, monosomy 13 and 14 (5%) and one with translocation of 8p and 14, derivative 16 with long arm replaced by extra copy 1q,monosomy 21. FISH showed gain of 1q, loss of 13 and 14, IGH-MYC fusion t(8:14)    - Started Miracle-RVd 21 day with velcade on days 1,8,15.   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 He has also been receiving zoledronic acid infusions dosed at 3 mg on a monthly basis. He last received zoledronic acid and daratumumab on 5/08.       He presented on 4/19/23  with fever, rigors, chest pain, and sob secondary to RLL PN. Discharged on 7d course of Levaquin. He was concomitantly found to have AZUCENA with a creatinine level that went up to 1.27 mg/dL from a baseline at 1. It was 1.36 upon discharge and 1.47 mg/dL on 4/28. It had improved to 1.13 mg/dL on 5/08 when he received zoledronic acid and daratumumab and is 1.47 mg/dL at the time of this visit.     Was on acyclovir daily for viral ppx and bactrim M/T for PJP ppx and they were held when he developed AZUCENA. Acyclovir was restarted but not bactrim. Acyclovir dose was reduced to 400mg during last visit.  Continues on PO revlimid daily and losartan 50 mg daily. He doesn't measure his BP at home. Bactrim was discontinued as he is more than 12 months after his transplant. During the last nephrology visit, losartan was discontinued and amlodipine was started. Cardiology discontinued amlodipine due to good BP control.       The patient denies any dysuria, any pollakiuria, any LE edema, any dyspnea on exertion. Reports new onset nocturia. Reports adequate PO intake.  The following portions of the patient's history were reviewed and updated as appropriate: allergies, current medications, past family history, past medical history, past social history, past surgical  history and problem list.    PAST MEDICAL HISTORY:  Past Medical History:   Diagnosis Date     CAD (coronary artery disease)     s/p stent to CFX     Heart attack (H)      Hyperlipidemia LDL goal <100      Hypertension      Stented coronary artery      Thyroid disease      PAST SURGICAL HISTORY:  Past Surgical History:   Procedure Laterality Date     BONE MARROW BIOPSY, BONE SPECIMEN, NEEDLE/TROCAR Left 5/17/2021    Procedure: BIOPSY, BONE MARROW with aspiration and ancillary studies;  Surgeon: Niharika Regalado MD;  Location: [facility] OR     BONE MARROW BIOPSY, BONE SPECIMEN, NEEDLE/TROCAR Left 10/14/2021    Procedure: BIOPSY, BONE MARROW;  Surgeon: Alexa Albert APRN CNP;  Location: [facility] OR     BONE MARROW BIOPSY, BONE SPECIMEN, NEEDLE/TROCAR Right 3/7/2022    Procedure: BIOPSY, BONE MARROW;  Surgeon: Deborah Carmona PA-C;  Location: [facility] OR     BONE MARROW BIOPSY, BONE SPECIMEN, NEEDLE/TROCAR N/A 5/9/2022    Procedure: BIOPSY, BONE MARROW;  Surgeon: Deborah Leblanc PA-C;  Location: [facility] OR     BONE MARROW BIOPSY, BONE SPECIMEN, NEEDLE/TROCAR Left 12/5/2022    Procedure: BIOPSY, BONE MARROW;  Surgeon: Alba Moses APRN CNP;  Location: [facility] OR     HEART CATH PTCA SINGLE VESSEL  10/10/2004    Stent to X - [facility]      INSERT CATHETER VASCULAR ACCESS Right 11/3/2021    Procedure: NON VALVED TUNNELED DUAL LUMEN APHARESIS CAPABLE LINE INSERTION, VASCULAR ACCESS CATHETER;  Surgeon: Werner Mcnamara MD;  Location: [facility] OR     IR CVC TUNNEL PLACEMENT > 5 YRS OF AGE  11/3/2021     IR CVC TUNNEL REMOVAL RIGHT  12/2/2021     THYROIDECTOMY N/A 5/3/2022    Procedure: total thyroidectomy, Left selective neck dissection level 6 and level 7 ;  Surgeon: Clarita Sepulveda MD;  Location: [facility] OR     MEDICATIONS:  Prescription Medications as of 6/26/2023       Rx Number Disp Refills Start End Last Dispensed Date Next Fill Date Owning Pharmacy    acetaminophen (TYLENOL) 500 MG tablet            Sig:  Take 500-1,000 mg by mouth every 6 hours as needed for mild pain    Class: Historical    Route: Oral    acyclovir (ZOVIRAX) 400 MG tablet  60 tablet 3 6/5/2023    [facility] [address]    Sig: Take 2 tablets (800 mg) by mouth 2 times daily    Class: E-Prescribe    Route: Oral    aspirin (ASA) 81 MG EC tablet    12/9/2021        Sig: Take 1 tablet (81 mg) by mouth daily    Class: Historical    Route: Oral    calcium carbonate-vitamin D (OSCAL) 500-5 MG-MCG tablet  90 tablet 3 5/22/2023    [facility] [address]    Sig: TAKE ONE TABLET BY MOUTH ONCE DAILY    Class: E-Prescribe    LENalidomide (REVLIMID) 10 MG CAPS capsule  28 capsule 0 6/6/2023    [place] [address]    Sig: Take 1 capsule (10 mg) by mouth daily    Class: E-Prescribe    Notes to Pharmacy: Auth#[id number]    Route: Oral    LENalidomide (REVLIMID) 10 MG CAPS capsule  28 capsule 0 5/8/2023    [place] [address]    Sig: Take 1 capsule (10 mg) by mouth daily    Class: E-Prescribe    Notes to Pharmacy: Auth#[id number]    Route: Oral    levothyroxine (SYNTHROID/LEVOTHROID) 125 MCG tablet  90 tablet 2 1/9/2023    [facility] [address]    Sig: Take 1 tablet (125 mcg) by mouth daily    Class: E-Prescribe    Route: Oral    Lidocaine (LIDOCARE) 4 % Patch  30 patch 0 4/21/2023    [place], [place] - [address] [facility]    Sig: Place 2 patches onto the skin every 24 hours To prevent lidocaine toxicity, patient should be patch free for 12 hrs daily.    Class: E-Prescribe    Route: Transdermal    losartan (COZAAR) 50 MG tablet  90 tablet 3 8/16/2022    [place] [address]    Sig: Take 1 tablet (50 mg) by mouth daily    Class: E-Prescribe    Route: Oral    potassium chloride ER (KLOR-CON M)  10 MEQ CR tablet  60 tablet 3 1/16/2023    [facility] [address]    Sig: Take 2 tablets (20 mEq) by mouth daily    Class: E-Prescribe    Route: Oral    rosuvastatin (CRESTOR) 40 MG tablet  90 tablet 3 9/13/2022    [facility] [address]    Sig: Take 1 tablet (40 mg) by mouth daily    Class: E-Prescribe    Route: Oral    vitamin B complex with vitamin C (STRESS TAB) tablet  90 tablet 3 5/16/2023    [facility] [address]    Sig: Take 1 tablet by mouth daily    Class: E-Prescribe    Route: Oral         ALLERGIES:    Allergies   Allergen Reactions     Blood Transfusion Related (Informational Only) Other (See Comments)     Patient has a history of a clinically significant antibody against RBC antigens.  A delay in compatible RBCs may occur.      REVIEW OF SYSTEMS:  Review Of Systems  Skin: negative for, pigmentation, acne, rash, scaling, itching, bruising  Eyes: negative for, visual blurring, double vision, glaucoma, cataracts, eye pain, color blindness, glasses  Ears/Nose/Throat: negative for, nasal congestion, purulent rhinorrhea, sneezing, postnasal drainage, hearing loss, deafness, tinnitus, vertigo  Respiratory: No shortness of breath, dyspnea on exertion, cough, or hemoptysis  Cardiovascular: negative for, palpitations, tachycardia, irregular heart beat, chest pain, exertional chest pain or pressure and paroxysmal nocturnal dyspnea  Gastrointestinal: negative for, poor appetite, dysphagia, nausea, vomiting, heartburn, dyspepsia, reflux and hematemesis  Genitourinary: negative for, nocturia, dysuria, frequency, urgency, hesitancy, hematuria, retention and decreased urinary stream  Musculoskeletal: negative for, fracture, back pain, neck pain, arthritis, joint pain, joint swelling and joint stiffness  Neurologic: negative for, headaches, syncope, stroke, seizures, paralysis, local weakness,  numbness or tingling of hands and numbness or tingling of feet    A comprehensive review of systems was performed and found to be negative except as described here or above.  SOCIAL HISTORY:   Social History     Socioeconomic History     Marital status:      Spouse name: Not on file     Number of children: Not on file     Years of education: Not on file     Highest education level: Not on file   Occupational History     Not on file   Tobacco Use     Smoking status: Never     Smokeless tobacco: Never   Substance and Sexual Activity     Alcohol use: Not Currently     Drug use: No     Sexual activity: Yes     Partners: Female   Other Topics Concern     Parent/sibling w/ CABG, MI or angioplasty before 65F 55M? No      Service Not Asked     Blood Transfusions Not Asked     Caffeine Concern No     Occupational Exposure Not Asked     Hobby Hazards Not Asked     Sleep Concern Not Asked     Stress Concern Not Asked     Weight Concern Not Asked     Special Diet No     Back Care Not Asked     Exercise No     Bike Helmet Not Asked     Seat Belt Yes     Self-Exams Not Asked   Social History Narrative     Not on file     Social Determinants of Health     Financial Resource Strain: Not on file   Food Insecurity: Not on file   Transportation Needs: Not on file   Physical Activity: Not on file   Stress: Not on file   Social Connections: Not on file   Intimate Partner Violence: Not At Risk (12/8/2022)    Humiliation, Afraid, Rape, and Kick questionnaire      Fear of Current or Ex-Partner: No      Emotionally Abused: No      Physically Abused: No      Sexually Abused: No   Housing Stability: Not on file     FAMILY MEDICAL HISTORY:   Family History   Problem Relation Age of Onset     Hypertension Mother      Hyperlipidemia Mother      Heart Disease Paternal Grandmother      Hyperlipidemia Sister      Hyperlipidemia Sister      PHYSICAL EXAM:   There were no vitals taken for this visit.  GENERAL APPEARANCE: alert and no  distress  EYES: nonicteric  HENT: mouth without ulcers or lesions  NECK: supple, no adenopathy  RESP: lungs clear to auscultation   CV: regular rhythm, normal rate, no rub  ABDOMEN: soft, nontender, normal bowel sounds, no HSM   Extremities: no clubbing, cyanosis, or edema  MS: no evidence of inflammation in joints, no muscle tenderness  SKIN: no rash  NEURO: mentation intact and speech normal  PSYCH: affect normal/bright   LABS:   Recent Results (from the past 672 hour(s))   Immunoglobulins A G and M    Collection Time: 06/05/23  7:15 AM   Result Value Ref Range    Immunoglobulin G 513 (L) 610 - 1,616 mg/dL    Immunoglobulin A 66 (L) 84 - 499 mg/dL    Immunoglobulin M 41 35 - 242 mg/dL   Kappa and lambda light chain    Collection Time: 06/05/23  7:15 AM   Result Value Ref Range    Kappa Free Light Chains 1.53 0.33 - 1.94 mg/dL    Lambda Free Light Chains 0.72 0.57 - 2.63 mg/dL    Kappa /Lambda Ratio 2.13 (H) 0.26 - 1.65   Comprehensive metabolic panel    Collection Time: 06/05/23  7:15 AM   Result Value Ref Range    Sodium 138 136 - 145 mmol/L    Potassium 3.6 3.4 - 5.3 mmol/L    Chloride 107 98 - 107 mmol/L    Carbon Dioxide (CO2) 24 22 - 29 mmol/L    Anion Gap 7 7 - 15 mmol/L    Urea Nitrogen 17.3 6.0 - 20.0 mg/dL    Creatinine 1.47 (H) 0.67 - 1.17 mg/dL    Calcium 8.7 8.6 - 10.0 mg/dL    Glucose 100 (H) 70 - 99 mg/dL    Alkaline Phosphatase 52 40 - 129 U/L    AST 26 10 - 50 U/L    ALT 17 10 - 50 U/L    Protein Total 6.1 (L) 6.4 - 8.3 g/dL    Albumin 4.1 3.5 - 5.2 g/dL    Bilirubin Total 0.4 <=1.2 mg/dL    GFR Estimate 56 (L) >60 mL/min/1.73m2   Vitamin D Deficiency    Collection Time: 06/05/23  7:15 AM   Result Value Ref Range    Vitamin D, Total (25-Hydroxy) 34 20 - 75 ug/L   Parathyroid Hormone Intact    Collection Time: 06/05/23  7:15 AM   Result Value Ref Range    Parathyroid Hormone Intact 42 15 - 65 pg/mL   Total Protein, Serum for ELP    Collection Time: 06/05/23  7:15 AM   Result Value Ref Range    Total  Protein Serum for ELP 5.4 (L) 6.4 - 8.3 g/dL   CBC with platelets and differential    Collection Time: 06/05/23  7:15 AM   Result Value Ref Range    WBC Count 4.4 4.0 - 11.0 10e3/uL    RBC Count 3.54 (L) 4.40 - 5.90 10e6/uL    Hemoglobin 10.6 (L) 13.3 - 17.7 g/dL    Hematocrit 33.2 (L) 40.0 - 53.0 %    MCV 94 78 - 100 fL    MCH 29.9 26.5 - 33.0 pg    MCHC 31.9 31.5 - 36.5 g/dL    RDW 17.2 (H) 10.0 - 15.0 %    Platelet Count 306 150 - 450 10e3/uL    % Neutrophils 44 %    % Lymphocytes 34 %    % Monocytes 14 %    % Eosinophils 7 %    % Basophils 1 %    % Immature Granulocytes 0 %    NRBCs per 100 WBC 0 <1 /100    Absolute Neutrophils 2.0 1.6 - 8.3 10e3/uL    Absolute Lymphocytes 1.5 0.8 - 5.3 10e3/uL    Absolute Monocytes 0.6 0.0 - 1.3 10e3/uL    Absolute Eosinophils 0.3 0.0 - 0.7 10e3/uL    Absolute Basophils 0.0 0.0 - 0.2 10e3/uL    Absolute Immature Granulocytes 0.0 <=0.4 10e3/uL    Absolute NRBCs 0.0 10e3/uL   Phosphorus    Collection Time: 06/05/23  7:15 AM   Result Value Ref Range    Phosphorus 2.7 2.5 - 4.5 mg/dL   Protein  random urine    Collection Time: 06/05/23  7:18 AM   Result Value Ref Range    Total Protein Urine mg/dL 11.9 1.0 - 14.0 mg/dL    Total Protein UR MG/MG CR 0.10 0.00 - 0.20 mg/mg Cr    Creatinine Urine mg/dL 119.0 mg/dL   UA with Microscopic    Collection Time: 06/05/23  7:18 AM   Result Value Ref Range    Color Urine Light Yellow Colorless, Straw, Light Yellow, Yellow    Appearance Urine Clear Clear    Glucose Urine Negative Negative mg/dL    Bilirubin Urine Negative Negative    Ketones Urine Negative Negative mg/dL    Specific Gravity Urine 1.019 1.003 - 1.035    Blood Urine Negative Negative    pH Urine 6.5 5.0 - 7.0    Protein Albumin Urine 10 (A) Negative mg/dL    Urobilinogen Urine Normal Normal, 2.0 mg/dL    Nitrite Urine Negative Negative    Leukocyte Esterase Urine Negative Negative    Mucus Urine Present (A) None Seen /LPF    RBC Urine <1 <=2 /HPF    WBC Urine 1 <=5 /HPF   Protein  Immunofixation Serum    Collection Time: 06/05/23 11:57 AM   Result Value Ref Range    Immunofixation ELP       Very small monoclonal IgG immunoglobulin of kappa light chain type. Monoclonal antibody therapeutics (e.g. Daratumumab) may appear as monoclonal proteins on serum electrophoresis and immunofixation, usually as very small IgG kappa monoclonals. Results should be interpreted with caution if the patient is receiving monoclonal antibody therapy. Pathologic significance requires clinical correlation.  KAMALJIT Partida M.D., Ph.D., Pathologist ()   Immunoglobulins A G and M    Collection Time: 06/05/23 11:57 AM   Result Value Ref Range    Immunoglobulin G 518 (L) 610 - 1,616 mg/dL    Immunoglobulin A 67 (L) 84 - 499 mg/dL    Immunoglobulin M 44 35 - 242 mg/dL   Kappa and lambda light chain    Collection Time: 06/05/23 11:57 AM   Result Value Ref Range    Kappa Free Light Chains 1.46 0.33 - 1.94 mg/dL    Lambda Free Light Chains 0.84 0.57 - 2.63 mg/dL    Kappa /Lambda Ratio 1.74 (H) 0.26 - 1.65   Comprehensive metabolic panel    Collection Time: 06/05/23 11:57 AM   Result Value Ref Range    Sodium 137 136 - 145 mmol/L    Potassium 3.6 3.4 - 5.3 mmol/L    Chloride 105 98 - 107 mmol/L    Carbon Dioxide (CO2) 23 22 - 29 mmol/L    Anion Gap 9 7 - 15 mmol/L    Urea Nitrogen 16.2 6.0 - 20.0 mg/dL    Creatinine 1.36 (H) 0.67 - 1.17 mg/dL    Calcium 8.6 8.6 - 10.0 mg/dL    Glucose 112 (H) 70 - 99 mg/dL    Alkaline Phosphatase 56 40 - 129 U/L    AST 33 10 - 50 U/L    ALT 17 10 - 50 U/L    Protein Total 6.4 6.4 - 8.3 g/dL    Albumin 4.3 3.5 - 5.2 g/dL    Bilirubin Total 0.5 <=1.2 mg/dL    GFR Estimate 62 >60 mL/min/1.73m2   Total Protein, Serum for ELP    Collection Time: 06/05/23 11:57 AM   Result Value Ref Range    Total Protein Serum for ELP 5.9 (L) 6.4 - 8.3 g/dL   Protein Electrophoresis, Serum    Collection Time: 06/05/23 11:57 AM   Result Value Ref Range    Albumin 3.6 (L) 3.7 - 5.1 g/dL    Alpha 1 0.3 0.2  - 0.4 g/dL    Alpha 2 0.5 0.5 - 0.9 g/dL    Beta Globulin 0.6 0.6 - 1.0 g/dL    Gamma Globulin 0.5 (L) 0.7 - 1.6 g/dL    Monoclonal Peak 0.1 (H) <=0.0 g/dL    ELP Interpretation       Very small monoclonal protein (about 0.1 g/dL) seen in the mid-gamma fraction. See immunofixation report on same specimen. Slight hypoalbuminemia, and significant hypogammaglobulinemia. Pathologic significance requires clinical correlation. KAMALJIT Partida M.D., Ph.D., Pathologist ().   CBC with platelets and differential    Collection Time: 06/05/23 11:57 AM   Result Value Ref Range    WBC Count 4.6 4.0 - 11.0 10e3/uL    RBC Count 3.60 (L) 4.40 - 5.90 10e6/uL    Hemoglobin 10.8 (L) 13.3 - 17.7 g/dL    Hematocrit 32.9 (L) 40.0 - 53.0 %    MCV 91 78 - 100 fL    MCH 30.0 26.5 - 33.0 pg    MCHC 32.8 31.5 - 36.5 g/dL    RDW 17.2 (H) 10.0 - 15.0 %    Platelet Count 316 150 - 450 10e3/uL    % Neutrophils 43 %    % Lymphocytes 37 %    % Monocytes 14 %    % Eosinophils 5 %    % Basophils 1 %    % Immature Granulocytes 0 %    NRBCs per 100 WBC 0 <1 /100    Absolute Neutrophils 2.0 1.6 - 8.3 10e3/uL    Absolute Lymphocytes 1.7 0.8 - 5.3 10e3/uL    Absolute Monocytes 0.6 0.0 - 1.3 10e3/uL    Absolute Eosinophils 0.2 0.0 - 0.7 10e3/uL    Absolute Basophils 0.0 0.0 - 0.2 10e3/uL    Absolute Immature Granulocytes 0.0 <=0.4 10e3/uL    Absolute NRBCs 0.0 10e3/uL   Immunoglobulins A G and M    Collection Time: 06/12/23  3:47 PM   Result Value Ref Range    Immunoglobulin G 550 (L) 610 - 1,616 mg/dL    Immunoglobulin A 67 (L) 84 - 499 mg/dL    Immunoglobulin M 43 35 - 242 mg/dL   Kappa and lambda light chain    Collection Time: 06/12/23  3:47 PM   Result Value Ref Range    Kappa Free Light Chains 1.58 0.33 - 1.94 mg/dL    Lambda Free Light Chains 0.79 0.57 - 2.63 mg/dL    Kappa /Lambda Ratio 2.00 (H) 0.26 - 1.65   Protein Immunofixation Serum    Collection Time: 06/12/23  3:47 PM   Result Value Ref Range    Immunofixation ELP       Monoclonal  IgG immunoglobulin of kappa light chain type. Monoclonal antibody therapeutics (e.g. Daratumumab) may appear as monoclonal proteins on serum electrophoresis and immunofixation. Results should be interpreted with caution. Pathologic significance requires clinical correlation. BRIA Dailey M.D., Ph.D., Pathologist   Comprehensive metabolic panel    Collection Time: 06/12/23  3:47 PM   Result Value Ref Range    Sodium 140 136 - 145 mmol/L    Potassium 3.6 3.4 - 5.3 mmol/L    Chloride 107 98 - 107 mmol/L    Carbon Dioxide (CO2) 24 22 - 29 mmol/L    Anion Gap 9 7 - 15 mmol/L    Urea Nitrogen 10.3 6.0 - 20.0 mg/dL    Creatinine 1.27 (H) 0.67 - 1.17 mg/dL    Calcium 9.2 8.6 - 10.0 mg/dL    Glucose 113 (H) 70 - 99 mg/dL    Alkaline Phosphatase 56 40 - 129 U/L    AST 36 10 - 50 U/L    ALT 21 10 - 50 U/L    Protein Total 6.6 6.4 - 8.3 g/dL    Albumin 4.4 3.5 - 5.2 g/dL    Bilirubin Total 0.4 <=1.2 mg/dL    GFR Estimate 67 >60 mL/min/1.73m2   Total Protein, Serum for ELP    Collection Time: 06/12/23  3:47 PM   Result Value Ref Range    Total Protein Serum for ELP 6.3 (L) 6.4 - 8.3 g/dL   Protein Electrophoresis, Serum    Collection Time: 06/12/23  3:47 PM   Result Value Ref Range    Albumin 4.1 3.7 - 5.1 g/dL    Alpha 1 0.3 0.2 - 0.4 g/dL    Alpha 2 0.6 0.5 - 0.9 g/dL    Beta Globulin 0.7 0.6 - 1.0 g/dL    Gamma Globulin 0.5 (L) 0.7 - 1.6 g/dL    Monoclonal Peak 0.1 (H) <=0.0 g/dL    ELP Interpretation       Small monoclonal protein (0.1 g/dL) seen in the gamma fraction. See immunofixation report on same specimen. Hypogammaglobulinemia. Pathologic significance requires clinical correlation. BRIA Dailey M.D., Ph.D., Pathologist.   CBC with platelets and differential    Collection Time: 06/12/23  3:47 PM   Result Value Ref Range    WBC Count 4.1 4.0 - 11.0 10e3/uL    RBC Count 3.75 (L) 4.40 - 5.90 10e6/uL    Hemoglobin 11.1 (L) 13.3 - 17.7 g/dL    Hematocrit 33.5 (L) 40.0 - 53.0 %    MCV 89 78 - 100 fL    MCH 29.6 26.5 - 33.0  pg    MCHC 33.1 31.5 - 36.5 g/dL    RDW 16.6 (H) 10.0 - 15.0 %    Platelet Count 325 150 - 450 10e3/uL    % Neutrophils 47 %    % Lymphocytes 30 %    % Monocytes 18 %    % Eosinophils 4 %    % Basophils 1 %    % Immature Granulocytes 0 %    NRBCs per 100 WBC 0 <1 /100    Absolute Neutrophils 2.0 1.6 - 8.3 10e3/uL    Absolute Lymphocytes 1.2 0.8 - 5.3 10e3/uL    Absolute Monocytes 0.7 0.0 - 1.3 10e3/uL    Absolute Eosinophils 0.2 0.0 - 0.7 10e3/uL    Absolute Basophils 0.0 0.0 - 0.2 10e3/uL    Absolute Immature Granulocytes 0.0 <=0.4 10e3/uL    Absolute NRBCs 0.0 10e3/uL   Comprehensive metabolic panel (BMP + Alb, Alk Phos, ALT, AST, Total. Bili, TP)    Collection Time: 06/23/23  9:52 AM   Result Value Ref Range    Sodium 140 136 - 145 mmol/L    Potassium 4.2 3.4 - 5.3 mmol/L    Chloride 107 98 - 107 mmol/L    Carbon Dioxide (CO2) 22 22 - 29 mmol/L    Anion Gap 11 7 - 15 mmol/L    Urea Nitrogen 13.5 6.0 - 20.0 mg/dL    Creatinine 1.18 (H) 0.67 - 1.17 mg/dL    Calcium 8.9 8.6 - 10.0 mg/dL    Glucose 101 (H) 70 - 99 mg/dL    Alkaline Phosphatase 53 40 - 129 U/L    AST 28 0 - 45 U/L    ALT 17 0 - 70 U/L    Protein Total 6.3 (L) 6.4 - 8.3 g/dL    Albumin 4.3 3.5 - 5.2 g/dL    Bilirubin Total 0.4 <=1.2 mg/dL    GFR Estimate 73 >60 mL/min/1.73m2   Lipid Profile    Collection Time: 06/23/23  9:52 AM   Result Value Ref Range    Cholesterol 116 <200 mg/dL    Triglycerides 100 <150 mg/dL    Direct Measure HDL 40 >=40 mg/dL    LDL Cholesterol Calculated 56 <=100 mg/dL    Non HDL Cholesterol 76 <130 mg/dL   CBC with platelets and differential    Collection Time: 06/23/23  9:52 AM   Result Value Ref Range    WBC Count 3.8 (L) 4.0 - 11.0 10e3/uL    RBC Count 3.92 (L) 4.40 - 5.90 10e6/uL    Hemoglobin 11.8 (L) 13.3 - 17.7 g/dL    Hematocrit 37.0 (L) 40.0 - 53.0 %    MCV 94 78 - 100 fL    MCH 30.1 26.5 - 33.0 pg    MCHC 31.9 31.5 - 36.5 g/dL    RDW 16.8 (H) 10.0 - 15.0 %    Platelet Count 367 150 - 450 10e3/uL    % Neutrophils 40 %     % Lymphocytes 33 %    % Monocytes 12 %    % Eosinophils 14 %    % Basophils 1 %    % Immature Granulocytes 0 %    NRBCs per 100 WBC 0 <1 /100    Absolute Neutrophils 1.5 (L) 1.6 - 8.3 10e3/uL    Absolute Lymphocytes 1.3 0.8 - 5.3 10e3/uL    Absolute Monocytes 0.5 0.0 - 1.3 10e3/uL    Absolute Eosinophils 0.5 0.0 - 0.7 10e3/uL    Absolute Basophils 0.1 0.0 - 0.2 10e3/uL    Absolute Immature Granulocytes 0.0 <=0.4 10e3/uL    Absolute NRBCs 0.0 10e3/uL     CMP  Recent Labs   Lab Test 06/23/23  0952 06/12/23  1547 06/05/23  1157 06/05/23  0715 05/01/23  1703 04/28/23  1456 04/21/23  0617 04/20/23  0939 12/05/22  1144 11/10/22  1310 07/05/22  0922 06/17/22  1332 05/19/22  1352 05/09/22  0928 07/16/21  1442 07/09/21  1440 07/01/21  1441 06/25/21  1452 06/18/21  1444    140 137 138   < > 137 138  --    < > 137   < > 139   < > 140   < > 137 136 135 137   POTASSIUM 4.2 3.6 3.6 3.6   < > 3.9 3.5  3.3*  --    < > 3.6   < > 3.5   < > 4.0   < > 3.7 3.2* 3.2* 3.3*   CHLORIDE 107 107 105 107   < > 104 108*  --    < > 104   < > 108   < > 106   < > 107 103 101 104   CO2 22 24 23 24   < > 22 18*  --    < > 21*   < > 22   < > 26   < > 24 23 24 24   ANIONGAP 11 9 9 7   < > 11 12  --    < > 12   < > 9   < > 8   < > 5 9 10 9   * 113* 112* 100*   < > 112* 92  --    < > 145*   < > 127*   < > 106*   < > 91 131* 123* 116*   BUN 13.5 10.3 16.2 17.3   < > 22.3* 10.5  --    < > 16.4   < > 17   < > 13   < > 8 15 10 14   CR 1.18* 1.27* 1.36* 1.47*   < > 1.47* 1.36*  --    < > 1.30*   < > 0.97   < > 0.99   < > 0.87 1.21 0.96 0.89   GFRESTIMATED 73 67 62 56*   < > 56* 62  --    < > 65   < > >90   < > >90   < > >90 68 >90 >90   GFRESTBLACK  --   --   --   --   --   --   --   --   --   --   --   --   --   --   --  >90 79 >90 >90   HARDEEP 8.9 9.2 8.6 8.7   < > 9.9 8.2*  --    < > 9.1   < > 9.5   < > 9.3   < > 8.4* 8.2* 8.2* 8.2*   MAG  --   --   --   --   --  2.6* 1.7 1.9  --  1.9  --   --   --  1.8   < >  --   --   --   --    PHOS  --    --   --  2.7  --   --   --   --   --  2.6  --  3.6  --  3.0   < >  --   --   --   --    PROTTOTAL 6.3* 6.6 6.4 6.1*   < > 6.8  --   --    < > 6.5   < > 6.4*   < > 6.5*   < > 6.1* 6.0* 6.5* 6.6*   ALBUMIN 4.3 4.4 4.3 4.1   < > 4.2  --   --    < > 4.5   < > 4.0   < > 3.8   < > 3.2* 3.0* 3.1* 3.3*   BILITOTAL 0.4 0.4 0.5 0.4   < > 0.2  --   --    < > 0.4   < > 0.5   < > 0.4   < > 0.4 0.3 0.4 0.4   ALKPHOS 53 56 56 52   < > 82  --   --    < > 53   < > 54   < > 54   < > 76 91 103 103   AST 28 36 33 26   < > 20  --   --    < > 24   < > 20   < > 26   < > 23 32 25 20   ALT 17 21 17 17   < > 14  --   --    < > 12   < > 26   < > 27   < > 25 42 36 30    < > = values in this interval not displayed.     CBC  Recent Labs   Lab Test 06/23/23  0952 06/12/23  1547 06/05/23  1157 06/05/23  0715   HGB 11.8* 11.1* 10.8* 10.6*   WBC 3.8* 4.1 4.6 4.4   RBC 3.92* 3.75* 3.60* 3.54*   HCT 37.0* 33.5* 32.9* 33.2*   MCV 94 89 91 94   MCH 30.1 29.6 30.0 29.9   MCHC 31.9 33.1 32.8 31.9   RDW 16.8* 16.6* 17.2* 17.2*    325 316 306     INR  Recent Labs   Lab Test 12/05/22  1144 05/09/22  0928 03/07/22  0834 11/23/21  0415 11/22/21  1012 11/22/21  0414 11/21/21  0422 11/14/21  1103 11/10/21  0842 10/14/21  0943 10/13/21  1354   INR 0.98 0.89 0.96 1.16*  --    < > 1.23*   < > 0.95   < > 1.03   PTT  --   --   --   --  41*  --  46*  --  28  --  31    < > = values in this interval not displayed.     ABGNo lab results found.   URINE STUDIES  Recent Labs   Lab Test 06/05/23  0718 04/28/23  1600 04/19/23  1439 11/20/21  0900 10/13/21  1333 04/30/21  1023   COLOR Light Yellow Yellow Yellow Straw   < > Yellow   APPEARANCE Clear Clear Clear Clear   < > Clear   URINEGLC Negative Negative Negative Negative   < > Negative   URINEBILI Negative Negative Negative Negative   < > Negative   URINEKETONE Negative Negative Negative Negative   < > Negative   SG 1.019 1.014 1.017 1.008   < > 1.015   UBLD Negative Negative Negative Negative   < > Trace*   URINEPH  6.5 5.5 7.5* 7.0   < > 7.0   PROTEIN 10* Negative 50* Negative   < > Trace*   UROBILINOGEN  --   --   --   --   --  0.2   NITRITE Negative Negative Negative Negative   < > Negative   LEUKEST Negative Negative Negative Negative   < > Negative   RBCU <1 1 2 1   < > O - 2   WBCU 1 1 5 1   < > 0 - 5    < > = values in this interval not displayed.     Recent Labs   Lab Test 05/09/22  0900 10/16/21  0525   UTPG 0.11 0.06           ASSESSMENT AND PLAN:   #AZUCENA possibly secondary to prerenal azotemia with hemodynamic changes induced by losartan. Acyclovir toxicity is also a concern. Renal imaging done on 4/28 is unremarkable. The UPCR is 0.1 mg/mg Cr. Losartan was discontinued and switched to amlodipine. This was also discontinued in the setting of good BP control. Decreased acyclovir to 400mg twice daily. Kidney function as improved after discontinuing losartan  The patient was asked to keep a BP diary  Follow up as needed, no need for regular follow up      #HTN  Currently well controlled off medication      #Blood count  Hemoglobin  10.8 management per oncology    #Acid-base status  CO2 level 23 no acute issue    #Electrolytes  Na 140  K 4.2, on supplementation but does not necessarily require it. Recommended discontinuing potassium but finally decision of patient     #BMD  Ca  8.6        P 2.7  Alb 4.3  Vit D level 34 no need for any supplementation, and discussed with patient. He can discuss with oncology again but theoretically not required.     Orlando Willingham MD   Visiting Resident Nephrology PGY II   06/26/23 3:36 PM      Case was discussed with attending physician Dr. Leiva.       The total time of this encounter amounted to 60 minutes on the day of the encounter. This time included time spent with the patient, reviewing records, ordering tests, and performing post visit documentation.   Labs ordered include: CBC with diff, Renal Panel, CMP, UA with microscopy, UPCR, UACR, vitamin D levels    The patient will  return to follow up in 2 weeks    Yuliya Leiva MD  Division of Renal Disease and Hypertension

## 2023-06-26 NOTE — NURSING NOTE
Chief Complaint   Patient presents with     RECHECK     2-3 wk f/u       /75   Pulse 68   Temp 98.4  F (36.9  C) (Oral)   Wt 70.4 kg (155 lb 4.8 oz)   SpO2 100%   BMI 25.07 kg/m      Ba Johnson on 6/26/2023 at 3:03 PM

## 2023-06-26 NOTE — LETTER
6/26/2023       RE: June Ronquillo  [address]  [facility] [address]     Dear Colleague,    Thank you for referring your patient, June Ronquillo, to the [facility]PHROLOG[facility] [place] at [facility]. Please see a copy of my visit note below.    Nephrology Clinic    June Ronquillo MRN:[id number] YOB: 1970  Date of Service: 06/26/2023  Primary care provider: Selma Johnson  Requesting physician: Laura Ballesteros APRN CNP      REASON FOR CONSULT: Acute kidney Injury    HISTORY OF PRESENT ILLNESS:   June Ronquillo is a 52 year old male who presents for evaluation of acute kidney injury.  The past medical history is significant for hypertension diagnosed 5 years ago, hypothyroidism s/p thyroidectomy on levothyroxine and multiple myeloma diagnosed in April 2021.    The history of his myeloma is the following:  - 4/30/2021 M spike of 34.8 in urine.M spike in blood 0.2; IgG 702 with depressed IgA and IgM. Beta 2 microglobulin 2.7. Lambda  with K/L ratio of 0.01. WBC 11.1 with absolute eos of 1.0. hgb, plt, Cr (1.1), and albumin WNL.     -4/30/2021 CT abd/pelvis showed sclerotic marrow changes with numerous lytic appearing lesions throughout the imaged portions of the spine, pelvis and ribs.      -PET scan 5/11/2021 Numerous osteolytic lesions which predominantly demonstrate uptake below liver background levels. There is one intraosseous lesion in the left lateral 9th rib that demonstrates uptake above background, possibly due to nondisplaced fracture. Adjacent to this lesion is mild  hypermetabolism to the left pleura, with new small left pleural effusion, suspicious for malignant effusion versus posttraumatic effusion. Pleural uptake may represent extramedullary myeloma extending along the intercostal space versus inflammation.     - BM bx 5/17/2021 with flow showing 4.0% plasma cells which express CD19  (dim), CD38 (bright), CD45 (dim), , and monotypic cytoplasmic lambda immunoglobulin light chains but lack CD20 and CD56.  Hypercellular bone marrow for age (approximately 75% cellularity) with adequate trilineage hematopoiesis. Plasma cell infiltrate: Interstitial, lambda monotypic and representing approximately 60% the bone marrow cellularity, by  immunohistochemical stain. Cytpgenetics with 2 related hypodiploid clones. One with loss of Y, monosomy 13 and 14 (5%) and one with translocation of 8p and 14, derivative 16 with long arm replaced by extra copy 1q,monosomy 21. FISH showed gain of 1q, loss of 13 and 14, IGH-MYC fusion t(8:14)    - Started Miracle-RVd 21 day with velcade on days 1,8,15.   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 He has also been receiving zoledronic acid infusions dosed at 3 mg on a monthly basis. He last received zoledronic acid and daratumumab on 5/08.       He presented on 4/19/23  with fever, rigors, chest pain, and sob secondary to RLL PN. Discharged on 7d course of Levaquin. He was concomitantly found to have AZUCENA with a creatinine level that went up to 1.27 mg/dL from a baseline at 1. It was 1.36 upon discharge and 1.47 mg/dL on 4/28. It had improved to 1.13 mg/dL on 5/08 when he received zoledronic acid and daratumumab and is 1.47 mg/dL at the time of this visit.     Was on acyclovir daily for viral ppx and bactrim M/T for PJP ppx and they were held when he developed AZUCENA. Acyclovir was restarted but not bactrim. Acyclovir dose was reduced to 400mg during last visit.  Continues on PO revlimid daily and losartan 50 mg daily. He doesn't measure his BP at home. Bactrim was discontinued as he is more than 12 months after his transplant. During the last nephrology visit, losartan was discontinued and amlodipine was started. Cardiology discontinued amlodipine due to good BP control.       The patient denies  any dysuria, any pollakiuria, any LE edema, any dyspnea on exertion. Reports new onset nocturia. Reports adequate PO intake.  The following portions of the patient's history were reviewed and updated as appropriate: allergies, current medications, past family history, past medical history, past social history, past surgical history and problem list.    PAST MEDICAL HISTORY:  Past Medical History:   Diagnosis Date    CAD (coronary artery disease)     s/p stent to CFX    Heart attack (H)     Hyperlipidemia LDL goal <100     Hypertension     Stented coronary artery     Thyroid disease      PAST SURGICAL HISTORY:  Past Surgical History:   Procedure Laterality Date    BONE MARROW BIOPSY, BONE SPECIMEN, NEEDLE/TROCAR Left 5/17/2021    Procedure: BIOPSY, BONE MARROW with aspiration and ancillary studies;  Surgeon: Niharika Regalado MD;  Location: [facility] OR    BONE MARROW BIOPSY, BONE SPECIMEN, NEEDLE/TROCAR Left 10/14/2021    Procedure: BIOPSY, BONE MARROW;  Surgeon: Alexa Albert APRN CNP;  Location: [facility] OR    BONE MARROW BIOPSY, BONE SPECIMEN, NEEDLE/TROCAR Right 3/7/2022    Procedure: BIOPSY, BONE MARROW;  Surgeon: Deobrah Carmona PA-C;  Location: [facility] OR    BONE MARROW BIOPSY, BONE SPECIMEN, NEEDLE/TROCAR N/A 5/9/2022    Procedure: BIOPSY, BONE MARROW;  Surgeon: Deborah Leblanc PA-C;  Location: [facility] OR    BONE MARROW BIOPSY, BONE SPECIMEN, NEEDLE/TROCAR Left 12/5/2022    Procedure: BIOPSY, BONE MARROW;  Surgeon: Alba Moses APRN CNP;  Location: [facility] OR    HEART CATH PTCA SINGLE VESSEL  10/10/2004    Stent to X - [facility]     INSERT CATHETER VASCULAR ACCESS Right 11/3/2021    Procedure: NON VALVED TUNNELED DUAL LUMEN APHARESIS CAPABLE LINE INSERTION, VASCULAR ACCESS CATHETER;  Surgeon: Werner Mcnamara MD;  Location: [facility] OR    IR CVC TUNNEL PLACEMENT > 5 YRS OF AGE  11/3/2021    IR CVC TUNNEL REMOVAL RIGHT  12/2/2021    THYROIDECTOMY N/A 5/3/2022    Procedure: total thyroidectomy, Left  selective neck dissection level 6 and level 7 ;  Surgeon: Clarita Sepulveda MD;  Location: [facility] [place]     MEDICATIONS:  Prescription Medications as of 6/26/2023         Rx Number Disp Refills Start End Last Dispensed Date Next Fill Date Owning Pharmacy    acetaminophen (TYLENOL) 500 MG tablet            Sig: Take 500-1,000 mg by mouth every 6 hours as needed for mild pain    Class: Historical    Route: Oral    acyclovir (ZOVIRAX) 400 MG tablet  60 tablet 3 6/5/2023    [place] [address]    Sig: Take 2 tablets (800 mg) by mouth 2 times daily    Class: E-Prescribe    Route: Oral    aspirin (ASA) 81 MG EC tablet    12/9/2021        Sig: Take 1 tablet (81 mg) by mouth daily    Class: Historical    Route: Oral    calcium carbonate-vitamin D (OSCAL) 500-5 MG-MCG tablet  90 tablet 3 5/22/2023    [place] [address]    Sig: TAKE ONE TABLET BY MOUTH ONCE DAILY    Class: E-Prescribe    LENalidomide (REVLIMID) 10 MG CAPS capsule  28 capsule 0 6/6/2023    [place] [address]    Sig: Take 1 capsule (10 mg) by mouth daily    Class: E-Prescribe    Notes to Pharmacy: Auth#[id number]    Route: Oral    LENalidomide (REVLIMID) 10 MG CAPS capsule  28 capsule 0 5/8/2023    [place] [address]    Sig: Take 1 capsule (10 mg) by mouth daily    Class: E-Prescribe    Notes to Pharmacy: Auth#[id number]    Route: Oral    levothyroxine (SYNTHROID/LEVOTHROID) 125 MCG tablet  90 tablet 2 1/9/2023    [facility] [address]    Sig: Take 1 tablet (125 mcg) by mouth daily    Class: E-Prescribe    Route: Oral    Lidocaine (LIDOCARE) 4 % Patch  30 patch 0 4/21/2023    [place], [place] - [address] [place] [address]    Sig: Place 2 patches onto the skin every 24 hours To prevent lidocaine toxicity, patient should be  patch free for 12 hrs daily.    Class: E-Prescribe    Route: Transdermal    losartan (COZAAR) 50 MG tablet  90 tablet 3 8/16/2022    [facility] [address]    Sig: Take 1 tablet (50 mg) by mouth daily    Class: E-Prescribe    Route: Oral    potassium chloride ER (KLOR-CON M) 10 MEQ CR tablet  60 tablet 3 1/16/2023    [facility] [address]    Sig: Take 2 tablets (20 mEq) by mouth daily    Class: E-Prescribe    Route: Oral    rosuvastatin (CRESTOR) 40 MG tablet  90 tablet 3 9/13/2022    [facility] [address]    Sig: Take 1 tablet (40 mg) by mouth daily    Class: E-Prescribe    Route: Oral    vitamin B complex with vitamin C (STRESS TAB) tablet  90 tablet 3 5/16/2023    [facility] [address]    Sig: Take 1 tablet by mouth daily    Class: E-Prescribe    Route: Oral           ALLERGIES:    Allergies   Allergen Reactions    Blood Transfusion Related (Informational Only) Other (See Comments)     Patient has a history of a clinically significant antibody against RBC antigens.  A delay in compatible RBCs may occur.      REVIEW OF SYSTEMS:  Review Of Systems  Skin: negative for, pigmentation, acne, rash, scaling, itching, bruising  Eyes: negative for, visual blurring, double vision, glaucoma, cataracts, eye pain, color blindness, glasses  Ears/Nose/Throat: negative for, nasal congestion, purulent rhinorrhea, sneezing, postnasal drainage, hearing loss, deafness, tinnitus, vertigo  Respiratory: No shortness of breath, dyspnea on exertion, cough, or hemoptysis  Cardiovascular: negative for, palpitations, tachycardia, irregular heart beat, chest pain, exertional chest pain or pressure and paroxysmal nocturnal dyspnea  Gastrointestinal: negative for, poor appetite, dysphagia, nausea, vomiting, heartburn, dyspepsia, reflux and hematemesis  Genitourinary: negative for,  nocturia, dysuria, frequency, urgency, hesitancy, hematuria, retention and decreased urinary stream  Musculoskeletal: negative for, fracture, back pain, neck pain, arthritis, joint pain, joint swelling and joint stiffness  Neurologic: negative for, headaches, syncope, stroke, seizures, paralysis, local weakness, numbness or tingling of hands and numbness or tingling of feet    A comprehensive review of systems was performed and found to be negative except as described here or above.  SOCIAL HISTORY:   Social History     Socioeconomic History    Marital status:      Spouse name: Not on file    Number of children: Not on file    Years of education: Not on file    Highest education level: Not on file   Occupational History    Not on file   Tobacco Use    Smoking status: Never    Smokeless tobacco: Never   Substance and Sexual Activity    Alcohol use: Not Currently    Drug use: No    Sexual activity: Yes     Partners: Female   Other Topics Concern    Parent/sibling w/ CABG, MI or angioplasty before 65F 55M? No     Service Not Asked    Blood Transfusions Not Asked    Caffeine Concern No    Occupational Exposure Not Asked    Hobby Hazards Not Asked    Sleep Concern Not Asked    Stress Concern Not Asked    Weight Concern Not Asked    Special Diet No    Back Care Not Asked    Exercise No    Bike Helmet Not Asked    Seat Belt Yes    Self-Exams Not Asked   Social History Narrative    Not on file     Social Determinants of Health     Financial Resource Strain: Not on file   Food Insecurity: Not on file   Transportation Needs: Not on file   Physical Activity: Not on file   Stress: Not on file   Social Connections: Not on file   Intimate Partner Violence: Not At Risk (12/8/2022)    Humiliation, Afraid, Rape, and Kick questionnaire     Fear of Current or Ex-Partner: No     Emotionally Abused: No     Physically Abused: No     Sexually Abused: No   Housing Stability: Not on file     FAMILY MEDICAL HISTORY:   Family  History   Problem Relation Age of Onset    Hypertension Mother     Hyperlipidemia Mother     Heart Disease Paternal Grandmother     Hyperlipidemia Sister     Hyperlipidemia Sister      PHYSICAL EXAM:   There were no vitals taken for this visit.  GENERAL APPEARANCE: alert and no distress  EYES: nonicteric  HENT: mouth without ulcers or lesions  NECK: supple, no adenopathy  RESP: lungs clear to auscultation   CV: regular rhythm, normal rate, no rub  ABDOMEN: soft, nontender, normal bowel sounds, no HSM   Extremities: no clubbing, cyanosis, or edema  MS: no evidence of inflammation in joints, no muscle tenderness  SKIN: no rash  NEURO: mentation intact and speech normal  PSYCH: affect normal/bright   LABS:   Recent Results (from the past 672 hour(s))   Immunoglobulins A G and M    Collection Time: 06/05/23  7:15 AM   Result Value Ref Range    Immunoglobulin G 513 (L) 610 - 1,616 mg/dL    Immunoglobulin A 66 (L) 84 - 499 mg/dL    Immunoglobulin M 41 35 - 242 mg/dL   Kappa and lambda light chain    Collection Time: 06/05/23  7:15 AM   Result Value Ref Range    Kappa Free Light Chains 1.53 0.33 - 1.94 mg/dL    Lambda Free Light Chains 0.72 0.57 - 2.63 mg/dL    Kappa /Lambda Ratio 2.13 (H) 0.26 - 1.65   Comprehensive metabolic panel    Collection Time: 06/05/23  7:15 AM   Result Value Ref Range    Sodium 138 136 - 145 mmol/L    Potassium 3.6 3.4 - 5.3 mmol/L    Chloride 107 98 - 107 mmol/L    Carbon Dioxide (CO2) 24 22 - 29 mmol/L    Anion Gap 7 7 - 15 mmol/L    Urea Nitrogen 17.3 6.0 - 20.0 mg/dL    Creatinine 1.47 (H) 0.67 - 1.17 mg/dL    Calcium 8.7 8.6 - 10.0 mg/dL    Glucose 100 (H) 70 - 99 mg/dL    Alkaline Phosphatase 52 40 - 129 U/L    AST 26 10 - 50 U/L    ALT 17 10 - 50 U/L    Protein Total 6.1 (L) 6.4 - 8.3 g/dL    Albumin 4.1 3.5 - 5.2 g/dL    Bilirubin Total 0.4 <=1.2 mg/dL    GFR Estimate 56 (L) >60 mL/min/1.73m2   Vitamin D Deficiency    Collection Time: 06/05/23  7:15 AM   Result Value Ref Range    Vitamin  D, Total (25-Hydroxy) 34 20 - 75 ug/L   Parathyroid Hormone Intact    Collection Time: 06/05/23  7:15 AM   Result Value Ref Range    Parathyroid Hormone Intact 42 15 - 65 pg/mL   Total Protein, Serum for ELP    Collection Time: 06/05/23  7:15 AM   Result Value Ref Range    Total Protein Serum for ELP 5.4 (L) 6.4 - 8.3 g/dL   CBC with platelets and differential    Collection Time: 06/05/23  7:15 AM   Result Value Ref Range    WBC Count 4.4 4.0 - 11.0 10e3/uL    RBC Count 3.54 (L) 4.40 - 5.90 10e6/uL    Hemoglobin 10.6 (L) 13.3 - 17.7 g/dL    Hematocrit 33.2 (L) 40.0 - 53.0 %    MCV 94 78 - 100 fL    MCH 29.9 26.5 - 33.0 pg    MCHC 31.9 31.5 - 36.5 g/dL    RDW 17.2 (H) 10.0 - 15.0 %    Platelet Count 306 150 - 450 10e3/uL    % Neutrophils 44 %    % Lymphocytes 34 %    % Monocytes 14 %    % Eosinophils 7 %    % Basophils 1 %    % Immature Granulocytes 0 %    NRBCs per 100 WBC 0 <1 /100    Absolute Neutrophils 2.0 1.6 - 8.3 10e3/uL    Absolute Lymphocytes 1.5 0.8 - 5.3 10e3/uL    Absolute Monocytes 0.6 0.0 - 1.3 10e3/uL    Absolute Eosinophils 0.3 0.0 - 0.7 10e3/uL    Absolute Basophils 0.0 0.0 - 0.2 10e3/uL    Absolute Immature Granulocytes 0.0 <=0.4 10e3/uL    Absolute NRBCs 0.0 10e3/uL   Phosphorus    Collection Time: 06/05/23  7:15 AM   Result Value Ref Range    Phosphorus 2.7 2.5 - 4.5 mg/dL   Protein  random urine    Collection Time: 06/05/23  7:18 AM   Result Value Ref Range    Total Protein Urine mg/dL 11.9 1.0 - 14.0 mg/dL    Total Protein UR MG/MG CR 0.10 0.00 - 0.20 mg/mg Cr    Creatinine Urine mg/dL 119.0 mg/dL   UA with Microscopic    Collection Time: 06/05/23  7:18 AM   Result Value Ref Range    Color Urine Light Yellow Colorless, Straw, Light Yellow, Yellow    Appearance Urine Clear Clear    Glucose Urine Negative Negative mg/dL    Bilirubin Urine Negative Negative    Ketones Urine Negative Negative mg/dL    Specific Gravity Urine 1.019 1.003 - 1.035    Blood Urine Negative Negative    pH Urine 6.5 5.0 -  7.0    Protein Albumin Urine 10 (A) Negative mg/dL    Urobilinogen Urine Normal Normal, 2.0 mg/dL    Nitrite Urine Negative Negative    Leukocyte Esterase Urine Negative Negative    Mucus Urine Present (A) None Seen /LPF    RBC Urine <1 <=2 /HPF    WBC Urine 1 <=5 /HPF   Protein Immunofixation Serum    Collection Time: 06/05/23 11:57 AM   Result Value Ref Range    Immunofixation ELP       Very small monoclonal IgG immunoglobulin of kappa light chain type. Monoclonal antibody therapeutics (e.g. Daratumumab) may appear as monoclonal proteins on serum electrophoresis and immunofixation, usually as very small IgG kappa monoclonals. Results should be interpreted with caution if the patient is receiving monoclonal antibody therapy. Pathologic significance requires clinical correlation.  KAMALJIT Partida M.D., Ph.D., Pathologist ()   Immunoglobulins A G and M    Collection Time: 06/05/23 11:57 AM   Result Value Ref Range    Immunoglobulin G 518 (L) 610 - 1,616 mg/dL    Immunoglobulin A 67 (L) 84 - 499 mg/dL    Immunoglobulin M 44 35 - 242 mg/dL   Kappa and lambda light chain    Collection Time: 06/05/23 11:57 AM   Result Value Ref Range    Kappa Free Light Chains 1.46 0.33 - 1.94 mg/dL    Lambda Free Light Chains 0.84 0.57 - 2.63 mg/dL    Kappa /Lambda Ratio 1.74 (H) 0.26 - 1.65   Comprehensive metabolic panel    Collection Time: 06/05/23 11:57 AM   Result Value Ref Range    Sodium 137 136 - 145 mmol/L    Potassium 3.6 3.4 - 5.3 mmol/L    Chloride 105 98 - 107 mmol/L    Carbon Dioxide (CO2) 23 22 - 29 mmol/L    Anion Gap 9 7 - 15 mmol/L    Urea Nitrogen 16.2 6.0 - 20.0 mg/dL    Creatinine 1.36 (H) 0.67 - 1.17 mg/dL    Calcium 8.6 8.6 - 10.0 mg/dL    Glucose 112 (H) 70 - 99 mg/dL    Alkaline Phosphatase 56 40 - 129 U/L    AST 33 10 - 50 U/L    ALT 17 10 - 50 U/L    Protein Total 6.4 6.4 - 8.3 g/dL    Albumin 4.3 3.5 - 5.2 g/dL    Bilirubin Total 0.5 <=1.2 mg/dL    GFR Estimate 62 >60 mL/min/1.73m2   Total Protein,  Serum for ELP    Collection Time: 06/05/23 11:57 AM   Result Value Ref Range    Total Protein Serum for ELP 5.9 (L) 6.4 - 8.3 g/dL   Protein Electrophoresis, Serum    Collection Time: 06/05/23 11:57 AM   Result Value Ref Range    Albumin 3.6 (L) 3.7 - 5.1 g/dL    Alpha 1 0.3 0.2 - 0.4 g/dL    Alpha 2 0.5 0.5 - 0.9 g/dL    Beta Globulin 0.6 0.6 - 1.0 g/dL    Gamma Globulin 0.5 (L) 0.7 - 1.6 g/dL    Monoclonal Peak 0.1 (H) <=0.0 g/dL    ELP Interpretation       Very small monoclonal protein (about 0.1 g/dL) seen in the mid-gamma fraction. See immunofixation report on same specimen. Slight hypoalbuminemia, and significant hypogammaglobulinemia. Pathologic significance requires clinical correlation. KAMALJIT Partida M.D., Ph.D., Pathologist ().   CBC with platelets and differential    Collection Time: 06/05/23 11:57 AM   Result Value Ref Range    WBC Count 4.6 4.0 - 11.0 10e3/uL    RBC Count 3.60 (L) 4.40 - 5.90 10e6/uL    Hemoglobin 10.8 (L) 13.3 - 17.7 g/dL    Hematocrit 32.9 (L) 40.0 - 53.0 %    MCV 91 78 - 100 fL    MCH 30.0 26.5 - 33.0 pg    MCHC 32.8 31.5 - 36.5 g/dL    RDW 17.2 (H) 10.0 - 15.0 %    Platelet Count 316 150 - 450 10e3/uL    % Neutrophils 43 %    % Lymphocytes 37 %    % Monocytes 14 %    % Eosinophils 5 %    % Basophils 1 %    % Immature Granulocytes 0 %    NRBCs per 100 WBC 0 <1 /100    Absolute Neutrophils 2.0 1.6 - 8.3 10e3/uL    Absolute Lymphocytes 1.7 0.8 - 5.3 10e3/uL    Absolute Monocytes 0.6 0.0 - 1.3 10e3/uL    Absolute Eosinophils 0.2 0.0 - 0.7 10e3/uL    Absolute Basophils 0.0 0.0 - 0.2 10e3/uL    Absolute Immature Granulocytes 0.0 <=0.4 10e3/uL    Absolute NRBCs 0.0 10e3/uL   Immunoglobulins A G and M    Collection Time: 06/12/23  3:47 PM   Result Value Ref Range    Immunoglobulin G 550 (L) 610 - 1,616 mg/dL    Immunoglobulin A 67 (L) 84 - 499 mg/dL    Immunoglobulin M 43 35 - 242 mg/dL   Kappa and lambda light chain    Collection Time: 06/12/23  3:47 PM   Result Value Ref  Range    Kappa Free Light Chains 1.58 0.33 - 1.94 mg/dL    Lambda Free Light Chains 0.79 0.57 - 2.63 mg/dL    Kappa /Lambda Ratio 2.00 (H) 0.26 - 1.65   Protein Immunofixation Serum    Collection Time: 06/12/23  3:47 PM   Result Value Ref Range    Immunofixation ELP       Monoclonal IgG immunoglobulin of kappa light chain type. Monoclonal antibody therapeutics (e.g. Daratumumab) may appear as monoclonal proteins on serum electrophoresis and immunofixation. Results should be interpreted with caution. Pathologic significance requires clinical correlation. BRIA Dailey M.D., Ph.D., Pathologist   Comprehensive metabolic panel    Collection Time: 06/12/23  3:47 PM   Result Value Ref Range    Sodium 140 136 - 145 mmol/L    Potassium 3.6 3.4 - 5.3 mmol/L    Chloride 107 98 - 107 mmol/L    Carbon Dioxide (CO2) 24 22 - 29 mmol/L    Anion Gap 9 7 - 15 mmol/L    Urea Nitrogen 10.3 6.0 - 20.0 mg/dL    Creatinine 1.27 (H) 0.67 - 1.17 mg/dL    Calcium 9.2 8.6 - 10.0 mg/dL    Glucose 113 (H) 70 - 99 mg/dL    Alkaline Phosphatase 56 40 - 129 U/L    AST 36 10 - 50 U/L    ALT 21 10 - 50 U/L    Protein Total 6.6 6.4 - 8.3 g/dL    Albumin 4.4 3.5 - 5.2 g/dL    Bilirubin Total 0.4 <=1.2 mg/dL    GFR Estimate 67 >60 mL/min/1.73m2   Total Protein, Serum for ELP    Collection Time: 06/12/23  3:47 PM   Result Value Ref Range    Total Protein Serum for ELP 6.3 (L) 6.4 - 8.3 g/dL   Protein Electrophoresis, Serum    Collection Time: 06/12/23  3:47 PM   Result Value Ref Range    Albumin 4.1 3.7 - 5.1 g/dL    Alpha 1 0.3 0.2 - 0.4 g/dL    Alpha 2 0.6 0.5 - 0.9 g/dL    Beta Globulin 0.7 0.6 - 1.0 g/dL    Gamma Globulin 0.5 (L) 0.7 - 1.6 g/dL    Monoclonal Peak 0.1 (H) <=0.0 g/dL    ELP Interpretation       Small monoclonal protein (0.1 g/dL) seen in the gamma fraction. See immunofixation report on same specimen. Hypogammaglobulinemia. Pathologic significance requires clinical correlation. BRIA Dailey M.D., Ph.D., Pathologist.   CBC with  platelets and differential    Collection Time: 06/12/23  3:47 PM   Result Value Ref Range    WBC Count 4.1 4.0 - 11.0 10e3/uL    RBC Count 3.75 (L) 4.40 - 5.90 10e6/uL    Hemoglobin 11.1 (L) 13.3 - 17.7 g/dL    Hematocrit 33.5 (L) 40.0 - 53.0 %    MCV 89 78 - 100 fL    MCH 29.6 26.5 - 33.0 pg    MCHC 33.1 31.5 - 36.5 g/dL    RDW 16.6 (H) 10.0 - 15.0 %    Platelet Count 325 150 - 450 10e3/uL    % Neutrophils 47 %    % Lymphocytes 30 %    % Monocytes 18 %    % Eosinophils 4 %    % Basophils 1 %    % Immature Granulocytes 0 %    NRBCs per 100 WBC 0 <1 /100    Absolute Neutrophils 2.0 1.6 - 8.3 10e3/uL    Absolute Lymphocytes 1.2 0.8 - 5.3 10e3/uL    Absolute Monocytes 0.7 0.0 - 1.3 10e3/uL    Absolute Eosinophils 0.2 0.0 - 0.7 10e3/uL    Absolute Basophils 0.0 0.0 - 0.2 10e3/uL    Absolute Immature Granulocytes 0.0 <=0.4 10e3/uL    Absolute NRBCs 0.0 10e3/uL   Comprehensive metabolic panel (BMP + Alb, Alk Phos, ALT, AST, Total. Bili, TP)    Collection Time: 06/23/23  9:52 AM   Result Value Ref Range    Sodium 140 136 - 145 mmol/L    Potassium 4.2 3.4 - 5.3 mmol/L    Chloride 107 98 - 107 mmol/L    Carbon Dioxide (CO2) 22 22 - 29 mmol/L    Anion Gap 11 7 - 15 mmol/L    Urea Nitrogen 13.5 6.0 - 20.0 mg/dL    Creatinine 1.18 (H) 0.67 - 1.17 mg/dL    Calcium 8.9 8.6 - 10.0 mg/dL    Glucose 101 (H) 70 - 99 mg/dL    Alkaline Phosphatase 53 40 - 129 U/L    AST 28 0 - 45 U/L    ALT 17 0 - 70 U/L    Protein Total 6.3 (L) 6.4 - 8.3 g/dL    Albumin 4.3 3.5 - 5.2 g/dL    Bilirubin Total 0.4 <=1.2 mg/dL    GFR Estimate 73 >60 mL/min/1.73m2   Lipid Profile    Collection Time: 06/23/23  9:52 AM   Result Value Ref Range    Cholesterol 116 <200 mg/dL    Triglycerides 100 <150 mg/dL    Direct Measure HDL 40 >=40 mg/dL    LDL Cholesterol Calculated 56 <=100 mg/dL    Non HDL Cholesterol 76 <130 mg/dL   CBC with platelets and differential    Collection Time: 06/23/23  9:52 AM   Result Value Ref Range    WBC Count 3.8 (L) 4.0 - 11.0 10e3/uL     RBC Count 3.92 (L) 4.40 - 5.90 10e6/uL    Hemoglobin 11.8 (L) 13.3 - 17.7 g/dL    Hematocrit 37.0 (L) 40.0 - 53.0 %    MCV 94 78 - 100 fL    MCH 30.1 26.5 - 33.0 pg    MCHC 31.9 31.5 - 36.5 g/dL    RDW 16.8 (H) 10.0 - 15.0 %    Platelet Count 367 150 - 450 10e3/uL    % Neutrophils 40 %    % Lymphocytes 33 %    % Monocytes 12 %    % Eosinophils 14 %    % Basophils 1 %    % Immature Granulocytes 0 %    NRBCs per 100 WBC 0 <1 /100    Absolute Neutrophils 1.5 (L) 1.6 - 8.3 10e3/uL    Absolute Lymphocytes 1.3 0.8 - 5.3 10e3/uL    Absolute Monocytes 0.5 0.0 - 1.3 10e3/uL    Absolute Eosinophils 0.5 0.0 - 0.7 10e3/uL    Absolute Basophils 0.1 0.0 - 0.2 10e3/uL    Absolute Immature Granulocytes 0.0 <=0.4 10e3/uL    Absolute NRBCs 0.0 10e3/uL     CMP  Recent Labs   Lab Test 06/23/23  0952 06/12/23  1547 06/05/23  1157 06/05/23  0715 05/01/23  1703 04/28/23  1456 04/21/23  0617 04/20/23  0939 12/05/22  1144 11/10/22  1310 07/05/22  0922 06/17/22  1332 05/19/22  1352 05/09/22  0928 07/16/21  1442 07/09/21  1440 07/01/21  1441 06/25/21  1452 06/18/21  1444    140 137 138   < > 137 138  --    < > 137   < > 139   < > 140   < > 137 136 135 137   POTASSIUM 4.2 3.6 3.6 3.6   < > 3.9 3.5  3.3*  --    < > 3.6   < > 3.5   < > 4.0   < > 3.7 3.2* 3.2* 3.3*   CHLORIDE 107 107 105 107   < > 104 108*  --    < > 104   < > 108   < > 106   < > 107 103 101 104   CO2 22 24 23 24   < > 22 18*  --    < > 21*   < > 22   < > 26   < > 24 23 24 24   ANIONGAP 11 9 9 7   < > 11 12  --    < > 12   < > 9   < > 8   < > 5 9 10 9   * 113* 112* 100*   < > 112* 92  --    < > 145*   < > 127*   < > 106*   < > 91 131* 123* 116*   BUN 13.5 10.3 16.2 17.3   < > 22.3* 10.5  --    < > 16.4   < > 17   < > 13   < > 8 15 10 14   CR 1.18* 1.27* 1.36* 1.47*   < > 1.47* 1.36*  --    < > 1.30*   < > 0.97   < > 0.99   < > 0.87 1.21 0.96 0.89   GFRESTIMATED 73 67 62 56*   < > 56* 62  --    < > 65   < > >90   < > >90   < > >90 68 >90 >90   GFRESTBLACK  --   --    --   --   --   --   --   --   --   --   --   --   --   --   --  >90 79 >90 >90   HARDEEP 8.9 9.2 8.6 8.7   < > 9.9 8.2*  --    < > 9.1   < > 9.5   < > 9.3   < > 8.4* 8.2* 8.2* 8.2*   MAG  --   --   --   --   --  2.6* 1.7 1.9  --  1.9  --   --   --  1.8   < >  --   --   --   --    PHOS  --   --   --  2.7  --   --   --   --   --  2.6  --  3.6  --  3.0   < >  --   --   --   --    PROTTOTAL 6.3* 6.6 6.4 6.1*   < > 6.8  --   --    < > 6.5   < > 6.4*   < > 6.5*   < > 6.1* 6.0* 6.5* 6.6*   ALBUMIN 4.3 4.4 4.3 4.1   < > 4.2  --   --    < > 4.5   < > 4.0   < > 3.8   < > 3.2* 3.0* 3.1* 3.3*   BILITOTAL 0.4 0.4 0.5 0.4   < > 0.2  --   --    < > 0.4   < > 0.5   < > 0.4   < > 0.4 0.3 0.4 0.4   ALKPHOS 53 56 56 52   < > 82  --   --    < > 53   < > 54   < > 54   < > 76 91 103 103   AST 28 36 33 26   < > 20  --   --    < > 24   < > 20   < > 26   < > 23 32 25 20   ALT 17 21 17 17   < > 14  --   --    < > 12   < > 26   < > 27   < > 25 42 36 30    < > = values in this interval not displayed.     CBC  Recent Labs   Lab Test 06/23/23  0952 06/12/23  1547 06/05/23  1157 06/05/23  0715   HGB 11.8* 11.1* 10.8* 10.6*   WBC 3.8* 4.1 4.6 4.4   RBC 3.92* 3.75* 3.60* 3.54*   HCT 37.0* 33.5* 32.9* 33.2*   MCV 94 89 91 94   MCH 30.1 29.6 30.0 29.9   MCHC 31.9 33.1 32.8 31.9   RDW 16.8* 16.6* 17.2* 17.2*    325 316 306     INR  Recent Labs   Lab Test 12/05/22  1144 05/09/22  0928 03/07/22  0834 11/23/21  0415 11/22/21  1012 11/22/21  0414 11/21/21  0422 11/14/21  1103 11/10/21  0842 10/14/21  0943 10/13/21  1354   INR 0.98 0.89 0.96 1.16*  --    < > 1.23*   < > 0.95   < > 1.03   PTT  --   --   --   --  41*  --  46*  --  28  --  31    < > = values in this interval not displayed.     ABGNo lab results found.   URINE STUDIES  Recent Labs   Lab Test 06/05/23  0718 04/28/23  1600 04/19/23  1439 11/20/21  0900 10/13/21  1333 04/30/21  1023   COLOR Light Yellow Yellow Yellow Straw   < > Yellow   APPEARANCE Clear Clear Clear Clear   < > Clear    URINEGLC Negative Negative Negative Negative   < > Negative   URINEBILI Negative Negative Negative Negative   < > Negative   URINEKETONE Negative Negative Negative Negative   < > Negative   SG 1.019 1.014 1.017 1.008   < > 1.015   UBLD Negative Negative Negative Negative   < > Trace*   URINEPH 6.5 5.5 7.5* 7.0   < > 7.0   PROTEIN 10* Negative 50* Negative   < > Trace*   UROBILINOGEN  --   --   --   --   --  0.2   NITRITE Negative Negative Negative Negative   < > Negative   LEUKEST Negative Negative Negative Negative   < > Negative   RBCU <1 1 2 1   < > O - 2   WBCU 1 1 5 1   < > 0 - 5    < > = values in this interval not displayed.     Recent Labs   Lab Test 05/09/22  0900 10/16/21  0525   UTPG 0.11 0.06           ASSESSMENT AND PLAN:   #AZUCENA possibly secondary to prerenal azotemia with hemodynamic changes induced by losartan. Acyclovir toxicity is also a concern. Renal imaging done on 4/28 is unremarkable. The UPCR is 0.1 mg/mg Cr. Losartan was discontinued and switched to amlodipine. This was also discontinued in the setting of good BP control. Decreased acyclovir to 400mg twice daily. Kidney function as improved after discontinuing losartan  The patient was asked to keep a BP diary  Follow up as needed, no need for regular follow up      #HTN  Currently well controlled off medication      #Blood count  Hemoglobin  10.8 management per oncology    #Acid-base status  CO2 level 23 no acute issue    #Electrolytes  Na 140  K 4.2, on supplementation but does not necessarily require it. Recommended discontinuing potassium but finally decision of patient     #BMD  Ca  8.6        P 2.7  Alb 4.3  Vit D level 34 no need for any supplementation, and discussed with patient. He can discuss with oncology again but theoretically not required.     Orlando Willingham MD   Visiting Resident Nephrology PGY II   06/26/23 3:36 PM      Case was discussed with attending physician Dr. Leiva.       The total time of this encounter amounted  to 60 minutes on the day of the encounter. This time included time spent with the patient, reviewing records, ordering tests, and performing post visit documentation.   Labs ordered include: CBC with diff, Renal Panel, CMP, UA with microscopy, UPCR, UACR, vitamin D levels    The patient will return to follow up in 2 weeks    Yuliya Leiva MD  Division of Renal Disease and Hypertension      Attestation signed by Yuliya Leiva MD at 6/26/2023  9:59 PM:  Physician Attestation   IYuliya, saw and evaluated June Ronquillo with the Resident Dr Willingham. I have reviewed and discussed with the Resident their history, physical and plan.    I personally reviewed the vital signs, medications, labs, and imaging.    In brief,resolved AZUCENA most likely hemodynamically mediated and well controlled BP off anti-hypertensives.    Rest per the resident's note.       I personally spent 30 minutes on the date of encounter for chart review, history taking, physical exam, labs and notes reviewed, advised and coordinating care.     Yuliya Leiva MD  Division of Nephrology and Hypertension  Pager [phone]  Date of Service (when I saw the patient): June 26, 2023

## 2023-06-28 LAB
TESTOST FREE SERPL-MCNC: 5.4 NG/DL
TESTOST SERPL-MCNC: 231 NG/DL (ref 240–950)

## 2023-06-28 NOTE — PROGRESS NOTES
Jun 30, 2023  Oncologic Hx:   - 4/30/2021 M spike of 34.8 in urine.M spike in blood 0.2; IgG 702 with depressed IgA and IgM. Beta 2 microglobulin 2.7. Lambda  with K/L ratio of 0.01. WBC 11.1 with absolute eos of 1.0. hgb, plt, Cr (1.1), and albumin WNL.    -4/30/2021 CT abd/pelvis showed sclerotic marrow changes with numerous lytic appearing lesions throughout the imaged portions of the spine, pelvis and ribs.      -PET scan 5/11/2021 Numerous osteolytic lesions which predominantly demonstrate uptake below liver background levels. There is one intraosseous lesion in the left lateral 9th rib that demonstrates uptake above background, possibly due to nondisplaced fracture. Adjacent to this lesion is mild  hypermetabolism to the left pleura, with new small left pleural effusion, suspicious for malignant effusion versus posttraumatic effusion. Pleural uptake may represent extramedullary myeloma extending along the intercostal space versus inflammation.    - BM bx 5/17/2021 with flow showing 4.0% plasma cells which express CD19 (dim), CD38 (bright), CD45 (dim), , and monotypic cytoplasmic lambda immunoglobulin light chains but lack CD20 and CD56.  Hypercellular bone marrow for age (approximately 75% cellularity) with adequate trilineage hematopoiesis. Plasma cell infiltrate: Interstitial, lambda monotypic and representing approximately 60% the bone marrow cellularity, by  immunohistochemical stain. Cytpgenetics with 2 related hypodiploid clones. One with loss of Y, monosomy 13 and 14 (5%) and one with translocation of 8p and 14, derivative 16 with long arm replaced by extra copy 1q,monosomy 21. FISH showed gain of 1q, loss of 13 and 14, IGH-MYC fusion t(8:14)    - Started Miracle-RVd 21 day with velcade on days 1,8,15.     -Cycle 2 Miracle-RVd. Dose reduce dex to 80 mg d/t fatigue and stomach upset on dex days. Also having cough with basilar streaking on CXR  cou- 8/31/2021 BM bx -rare suspicious plasma cells  in touch imprint. No increase in plasma cells by morph.    -8/31/2021 PET/CT Diffuse osteolytic lesions throughout the axial and appendicular skeleton. Increased sclerosis since prior exam 5/11/2021 without increased metabolism or size.  Hypermetabolic pretracheal lymph node as well as hypermetabolic level 2 lymph nodes within the right neck. These lymph nodes are likely reactive from autoimmune activation via medical therapy. Hypermetabolic subcentimeter nodules within the thyroid gland    - 11/11/2021 Autologous BMT    - 2/21/22 started maintenance with Revlimid 10 mg daily and daratumumab monthly; did Revlimid alone for C1 due to low IgG levels, added daratumumab starting C2      Interval Hx:   June is seen in clinic today for routine follow-up. He is feeling well. Good energy and appetite. No new pain today-- continues to have right shoulder soreness and left toe soreness which have been persistent since chemotherapy started. He does stretches daily which help, declines physical therapy at the time.   No fevers/ chills. No infectious. No headaches or dizziness. No new complaints--   ROS otherwise neg.         Physical Exam:  BP (!) 146/71   Pulse 81   Temp 97.8  F (36.6  C)   Resp 16   Wt 70.5 kg (155 lb 6.4 oz)   SpO2 100%   BMI 25.08 kg/m    General: No acute distress.  HEENT: EOMI, PERRL. Sclerae are anicteric. Oral mucosa is pink and moist with no lesions or thrush.   Lymph: Neck is supple with no lymphadenopathy in the cervical or supraclavicular areas.   Heart: Regular rate and rhythm.   Lungs: Clear to auscultation bilaterally.   Abdomen: Bowel sounds present, soft, nontender with no palpable hepatosplenomegaly or masses.   Extremities: No lower extremity edema noted bilaterally.   Neuro: Alert and oriented x3, CN grossly intact, steady gait  Skin: No rashes, petechiae, or bruising noted on exposed skin. Area of pruritis on outer left thigh    Labs:  I personally reviewed the following  labs:    Most Recent 3 CBC's:Recent Labs   Lab Test 06/30/23  1252 06/23/23  0952 06/12/23  1547   WBC 5.4 3.8* 4.1   HGB 11.2* 11.8* 11.1*   MCV 92 94 89    367 325     Most Recent 3 BMP's:  Recent Labs   Lab Test 06/30/23  1252 06/26/23  1442 06/23/23  0952    138 140   POTASSIUM 3.9 3.9 4.2   CHLORIDE 106 106 107   CO2 20* 23 22   BUN 13.1 13.2 13.5   CR 1.25* 1.27* 1.18*   ANIONGAP 11 9 11   HARDEEP 9.1 8.6 8.9   * 108* 101*     Most Recent 2 LFT's:  Recent Labs   Lab Test 06/30/23  1252 06/26/23  1442   AST 26 30   ALT 18 17   ALKPHOS 56 54   BILITOTAL 0.4 0.4      Latest Reference Range & Units 04/11/23 14:45 05/01/23 17:03 05/08/23 12:47 06/05/23 07:15 06/05/23 11:57 06/12/23 15:47   Kappa Free Lt Chain 0.33 - 1.94 mg/dL 1.25 0.73 0.78 1.53 1.46 1.58   Kappa Lambda Ratio 0.26 - 1.65  0.87 1.16 1.59 2.13 (H) 1.74 (H) 2.00 (H)   Lambda Free Lt Chain 0.57 - 2.63 mg/dL 1.44 0.63 0.49 (L) 0.72 0.84 0.79   Monoclonal Peak <=0.0 g/dL 0.0 0.0 0.0  0.1 (H) 0.1 (H)   (H): Data is abnormally high  (L): Data is abnormally low    Assessment and Plan  Logan has multiple myeloma with high risk cytogenetics. He got Vidya-RVD with VGPR, then underwent autologous transplant 11/11/21. On acyclovir daily for viral ppx and bactrim M/T for PJP ppx previously  held both of these due to elevated creatinine. Given his high risk cytogenetics he started vidya + revlimid maintenance therapy 2/21/22.  He had severe flu-like effects from his first dose of Zometa but he has been tolerating monthly Zometa dosed at 3 mg. Continue Vit D. Continues on PO revlimid daily.   - No longer in need of Bactrim as he is > 1 year post-transplant.  -  C18D1 Daratumumab today (6/5) and continue Revlimid 10 mg daily   -  Zometa monthly  - RTC q4 weeks with treatment    Elevated creatinine-- stable  Baseline creatinine 1.0-1.1  Creatinine elevation to 1.27 was noted on hospital admission 4/19/23 did not respond to IV fluid while inpatient.   Creatinine peaked at 1.47, now back WNL on most recent lab check.  CT with contrast from 4/19/23 with no obstruction, calculus, or hydronephrosis. UA 4/19 with proteinuria.  - Nephrology consulted-- OP visit 6/5. Myeloma labs continue to show an ongoing remission, so unlikely to be contributing. No signs or symptoms of dehydration to suggest pre-renal concern.   - Decreased acyclovir to 400 mg BID, patient was taking 800 mg BID (BMT dosing) -- per nephrology recs  - Estimated Creatinine Clearance: 66.6 mL/min (A) (based on SCr of 1.25 mg/dL (H)).     Hypogammaglobulinemia Start monthly IV Ig whenever IgG <300 (consider increasing to < 400 due to recurrent infections).    - IgG levels 6/12 = 550    Normocytic Anemia: likely d/t revlimid and daratumumab. Will keep monitoring and dose reduce if needed.    - Stable, Hgb 11.2 today     Immunizations  Received primary COVID series summer 2022.  Bivalent booster 5/8/23    HTN  Amlodipine  Stopped lisinopril per neph        30 minutes spent on the date of the encounter doing chart review, review of test results, patient visit and documentation     Georgina Cabezas [facility]-BC

## 2023-06-29 LAB — SCANNED LAB RESULT: NORMAL

## 2023-06-30 ENCOUNTER — APPOINTMENT (OUTPATIENT)
Dept: LAB | Facility: CLINIC | Age: 53
End: 2023-06-30
Attending: REGISTERED NURSE
Payer: COMMERCIAL

## 2023-06-30 ENCOUNTER — ONCOLOGY VISIT (OUTPATIENT)
Dept: ONCOLOGY | Facility: CLINIC | Age: 53
End: 2023-06-30
Attending: REGISTERED NURSE
Payer: COMMERCIAL

## 2023-06-30 VITALS
TEMPERATURE: 97.8 F | HEART RATE: 81 BPM | OXYGEN SATURATION: 100 % | WEIGHT: 155.4 LBS | DIASTOLIC BLOOD PRESSURE: 71 MMHG | SYSTOLIC BLOOD PRESSURE: 146 MMHG | RESPIRATION RATE: 16 BRPM | BODY MASS INDEX: 25.08 KG/M2

## 2023-06-30 DIAGNOSIS — C90.00 MULTIPLE MYELOMA NOT HAVING ACHIEVED REMISSION (H): Primary | ICD-10-CM

## 2023-06-30 DIAGNOSIS — R53.83 FATIGUE, UNSPECIFIED TYPE: Primary | ICD-10-CM

## 2023-06-30 LAB
ALBUMIN SERPL BCG-MCNC: 4.3 G/DL (ref 3.5–5.2)
ALP SERPL-CCNC: 56 U/L (ref 40–129)
ALT SERPL W P-5'-P-CCNC: 18 U/L (ref 0–70)
ANION GAP SERPL CALCULATED.3IONS-SCNC: 11 MMOL/L (ref 7–15)
AST SERPL W P-5'-P-CCNC: 26 U/L (ref 0–45)
BASOPHILS # BLD AUTO: 0.1 10E3/UL (ref 0–0.2)
BASOPHILS NFR BLD AUTO: 1 %
BILIRUB SERPL-MCNC: 0.4 MG/DL
BUN SERPL-MCNC: 13.1 MG/DL (ref 6–20)
CALCIUM SERPL-MCNC: 9.1 MG/DL (ref 8.6–10)
CHLORIDE SERPL-SCNC: 106 MMOL/L (ref 98–107)
CREAT SERPL-MCNC: 1.25 MG/DL (ref 0.67–1.17)
DEPRECATED HCO3 PLAS-SCNC: 20 MMOL/L (ref 22–29)
EOSINOPHIL # BLD AUTO: 0.7 10E3/UL (ref 0–0.7)
EOSINOPHIL NFR BLD AUTO: 14 %
ERYTHROCYTE [DISTWIDTH] IN BLOOD BY AUTOMATED COUNT: 16.7 % (ref 10–15)
GFR SERPL CREATININE-BSD FRML MDRD: 68 ML/MIN/1.73M2
GLUCOSE SERPL-MCNC: 162 MG/DL (ref 70–99)
HCT VFR BLD AUTO: 33.9 % (ref 40–53)
HGB BLD-MCNC: 11.2 G/DL (ref 13.3–17.7)
IMM GRANULOCYTES # BLD: 0 10E3/UL
IMM GRANULOCYTES NFR BLD: 0 %
LYMPHOCYTES # BLD AUTO: 1.5 10E3/UL (ref 0.8–5.3)
LYMPHOCYTES NFR BLD AUTO: 29 %
MCH RBC QN AUTO: 30.3 PG (ref 26.5–33)
MCHC RBC AUTO-ENTMCNC: 33 G/DL (ref 31.5–36.5)
MCV RBC AUTO: 92 FL (ref 78–100)
MONOCYTES # BLD AUTO: 0.5 10E3/UL (ref 0–1.3)
MONOCYTES NFR BLD AUTO: 10 %
NEUTROPHILS # BLD AUTO: 2.5 10E3/UL (ref 1.6–8.3)
NEUTROPHILS NFR BLD AUTO: 46 %
NRBC # BLD AUTO: 0 10E3/UL
NRBC BLD AUTO-RTO: 0 /100
PLATELET # BLD AUTO: 304 10E3/UL (ref 150–450)
POTASSIUM SERPL-SCNC: 3.9 MMOL/L (ref 3.4–5.3)
PROT SERPL-MCNC: 6.2 G/DL (ref 6.4–8.3)
RBC # BLD AUTO: 3.7 10E6/UL (ref 4.4–5.9)
SODIUM SERPL-SCNC: 137 MMOL/L (ref 136–145)
TOTAL PROTEIN SERUM FOR ELP: 5.9 G/DL (ref 6.4–8.3)
WBC # BLD AUTO: 5.4 10E3/UL (ref 4–11)

## 2023-06-30 PROCEDURE — 96374 THER/PROPH/DIAG INJ IV PUSH: CPT

## 2023-06-30 PROCEDURE — 80053 COMPREHEN METABOLIC PANEL: CPT

## 2023-06-30 PROCEDURE — 96401 CHEMO ANTI-NEOPL SQ/IM: CPT

## 2023-06-30 PROCEDURE — 84165 PROTEIN E-PHORESIS SERUM: CPT | Mod: 26

## 2023-06-30 PROCEDURE — 250N000011 HC RX IP 250 OP 636: Mod: JW | Performed by: NURSE PRACTITIONER

## 2023-06-30 PROCEDURE — 36415 COLL VENOUS BLD VENIPUNCTURE: CPT

## 2023-06-30 PROCEDURE — 82784 ASSAY IGA/IGD/IGG/IGM EACH: CPT

## 2023-06-30 PROCEDURE — 99214 OFFICE O/P EST MOD 30 MIN: CPT | Performed by: NURSE PRACTITIONER

## 2023-06-30 PROCEDURE — 84155 ASSAY OF PROTEIN SERUM: CPT

## 2023-06-30 PROCEDURE — 85025 COMPLETE CBC W/AUTO DIFF WBC: CPT

## 2023-06-30 PROCEDURE — 83521 IG LIGHT CHAINS FREE EACH: CPT

## 2023-06-30 PROCEDURE — G0463 HOSPITAL OUTPT CLINIC VISIT: HCPCS | Mod: 25 | Performed by: NURSE PRACTITIONER

## 2023-06-30 PROCEDURE — 86334 IMMUNOFIX E-PHORESIS SERUM: CPT | Mod: 26

## 2023-06-30 PROCEDURE — 86334 IMMUNOFIX E-PHORESIS SERUM: CPT | Performed by: PATHOLOGY

## 2023-06-30 PROCEDURE — 999N000248 HC STATISTIC IV INSERT WITH US BY RN

## 2023-06-30 PROCEDURE — 84165 PROTEIN E-PHORESIS SERUM: CPT | Mod: TC | Performed by: PATHOLOGY

## 2023-06-30 PROCEDURE — 258N000003 HC RX IP 258 OP 636: Performed by: NURSE PRACTITIONER

## 2023-06-30 RX ORDER — EPINEPHRINE 1 MG/ML
0.3 INJECTION, SOLUTION INTRAMUSCULAR; SUBCUTANEOUS EVERY 5 MIN PRN
Status: CANCELLED | OUTPATIENT
Start: 2023-06-30

## 2023-06-30 RX ORDER — DIPHENHYDRAMINE HCL 25 MG
50 CAPSULE ORAL ONCE
Status: CANCELLED
Start: 2023-06-30 | End: 2023-06-30

## 2023-06-30 RX ORDER — ALBUTEROL SULFATE 90 UG/1
1-2 AEROSOL, METERED RESPIRATORY (INHALATION)
Status: CANCELLED
Start: 2023-06-30

## 2023-06-30 RX ORDER — NALOXONE HYDROCHLORIDE 0.4 MG/ML
0.2 INJECTION, SOLUTION INTRAMUSCULAR; INTRAVENOUS; SUBCUTANEOUS
Status: CANCELLED | OUTPATIENT
Start: 2023-06-30

## 2023-06-30 RX ORDER — HEPARIN SODIUM (PORCINE) LOCK FLUSH IV SOLN 100 UNIT/ML 100 UNIT/ML
5 SOLUTION INTRAVENOUS
Status: CANCELLED | OUTPATIENT
Start: 2023-06-30

## 2023-06-30 RX ORDER — ALBUTEROL SULFATE 0.83 MG/ML
2.5 SOLUTION RESPIRATORY (INHALATION)
Status: CANCELLED | OUTPATIENT
Start: 2023-06-30

## 2023-06-30 RX ORDER — DIPHENHYDRAMINE HYDROCHLORIDE 50 MG/ML
50 INJECTION INTRAMUSCULAR; INTRAVENOUS
Status: CANCELLED
Start: 2023-06-30

## 2023-06-30 RX ORDER — HEPARIN SODIUM,PORCINE 10 UNIT/ML
5 VIAL (ML) INTRAVENOUS
Status: CANCELLED | OUTPATIENT
Start: 2023-06-30

## 2023-06-30 RX ORDER — MEPERIDINE HYDROCHLORIDE 25 MG/ML
25 INJECTION INTRAMUSCULAR; INTRAVENOUS; SUBCUTANEOUS EVERY 30 MIN PRN
Status: CANCELLED | OUTPATIENT
Start: 2023-06-30

## 2023-06-30 RX ORDER — ACETAMINOPHEN 325 MG/1
650 TABLET ORAL ONCE
Status: CANCELLED
Start: 2023-06-30 | End: 2023-06-30

## 2023-06-30 RX ORDER — DEXAMETHASONE 4 MG/1
12 TABLET ORAL ONCE
Status: CANCELLED
Start: 2023-06-30 | End: 2023-06-30

## 2023-06-30 RX ORDER — METHYLPREDNISOLONE SODIUM SUCCINATE 125 MG/2ML
125 INJECTION, POWDER, LYOPHILIZED, FOR SOLUTION INTRAMUSCULAR; INTRAVENOUS
Status: CANCELLED
Start: 2023-06-30

## 2023-06-30 RX ADMIN — DARATUMUMAB AND HYALURONIDASE-FIHJ (HUMAN RECOMBINANT) 1800 MG: 1800; 30000 INJECTION SUBCUTANEOUS at 14:32

## 2023-06-30 RX ADMIN — SODIUM CHLORIDE 250 ML: 9 INJECTION, SOLUTION INTRAVENOUS at 14:31

## 2023-06-30 RX ADMIN — ZOLEDRONIC ACID 3 MG: 4 INJECTION, SOLUTION, CONCENTRATE INTRAVENOUS at 14:32

## 2023-06-30 ASSESSMENT — PAIN SCALES - GENERAL: PAINLEVEL: NO PAIN (0)

## 2023-06-30 NOTE — PATIENT INSTRUCTIONS
[facility] Triage and after hours / weekends / holidays:  [phone]    Please call the triage or after hours line if you experience a temperature greater than or equal to 100.4, shaking chills, have uncontrolled nausea, vomiting and/or diarrhea, dizziness, shortness of breath, chest pain, bleeding, unexplained bruising, or if you have any other new/concerning symptoms, questions or concerns.      If you are having any concerning symptoms or wish to speak to a provider before your next infusion visit, please call your care coordinator or triage to notify them so we can adequately serve you.     If you need a refill on a narcotic prescription or other medication, please call before your infusion appointment.

## 2023-06-30 NOTE — LETTER
6/30/2023         RE: June Ronquillo  [address]  [place] [id number]        Dear Colleague,    Thank you for referring your patient, June Ronquillo, to the [facility]. Please see a copy of my visit note below.    Jun 30, 2023  Oncologic Hx:   - 4/30/2021 M spike of 34.8 in urine.M spike in blood 0.2; IgG 702 with depressed IgA and IgM. Beta 2 microglobulin 2.7. Lambda  with K/L ratio of 0.01. WBC 11.1 with absolute eos of 1.0. hgb, plt, Cr (1.1), and albumin WNL.    -4/30/2021 CT abd/pelvis showed sclerotic marrow changes with numerous lytic appearing lesions throughout the imaged portions of the spine, pelvis and ribs.      -PET scan 5/11/2021 Numerous osteolytic lesions which predominantly demonstrate uptake below liver background levels. There is one intraosseous lesion in the left lateral 9th rib that demonstrates uptake above background, possibly due to nondisplaced fracture. Adjacent to this lesion is mild  hypermetabolism to the left pleura, with new small left pleural effusion, suspicious for malignant effusion versus posttraumatic effusion. Pleural uptake may represent extramedullary myeloma extending along the intercostal space versus inflammation.    - BM bx 5/17/2021 with flow showing 4.0% plasma cells which express CD19 (dim), CD38 (bright), CD45 (dim), , and monotypic cytoplasmic lambda immunoglobulin light chains but lack CD20 and CD56.  Hypercellular bone marrow for age (approximately 75% cellularity) with adequate trilineage hematopoiesis. Plasma cell infiltrate: Interstitial, lambda monotypic and representing approximately 60% the bone marrow cellularity, by  immunohistochemical stain. Cytpgenetics with 2 related hypodiploid clones. One with loss of Y, monosomy 13 and 14 (5%) and one with translocation of 8p and 14, derivative 16 with long arm replaced by extra copy 1q,monosomy 21. FISH showed gain of 1q, loss of 13 and 14,  IGH-MYC fusion t(8:14)    - Started Miracle-RVd 21 day with velcade on days 1,8,15.     -Cycle 2 Miracle-RVd. Dose reduce dex to 80 mg d/t fatigue and stomach upset on dex days. Also having cough with basilar streaking on CXR  cou- 8/31/2021 BM bx -rare suspicious plasma cells in touch imprint. No increase in plasma cells by morph.    -8/31/2021 PET/CT Diffuse osteolytic lesions throughout the axial and appendicular skeleton. Increased sclerosis since prior exam 5/11/2021 without increased metabolism or size.  Hypermetabolic pretracheal lymph node as well as hypermetabolic level 2 lymph nodes within the right neck. These lymph nodes are likely reactive from autoimmune activation via medical therapy. Hypermetabolic subcentimeter nodules within the thyroid gland    - 11/11/2021 Autologous BMT    - 2/21/22 started maintenance with Revlimid 10 mg daily and daratumumab monthly; did Revlimid alone for C1 due to low IgG levels, added daratumumab starting C2      Interval Hx:   June is seen in clinic today for routine follow-up. He is feeling well. Good energy and appetite. No new pain today-- continues to have right shoulder soreness and left toe soreness which have been persistent since chemotherapy started. He does stretches daily which help, declines physical therapy at the time.   No fevers/ chills. No infectious. No headaches or dizziness. No new complaints--   ROS otherwise neg.         Physical Exam:  BP (!) 146/71   Pulse 81   Temp 97.8  F (36.6  C)   Resp 16   Wt 70.5 kg (155 lb 6.4 oz)   SpO2 100%   BMI 25.08 kg/m    General: No acute distress.  HEENT: EOMI, PERRL. Sclerae are anicteric. Oral mucosa is pink and moist with no lesions or thrush.   Lymph: Neck is supple with no lymphadenopathy in the cervical or supraclavicular areas.   Heart: Regular rate and rhythm.   Lungs: Clear to auscultation bilaterally.   Abdomen: Bowel sounds present, soft, nontender with no palpable hepatosplenomegaly or masses.    Extremities: No lower extremity edema noted bilaterally.   Neuro: Alert and oriented x3, CN grossly intact, steady gait  Skin: No rashes, petechiae, or bruising noted on exposed skin. Area of pruritis on outer left thigh    Labs:  I personally reviewed the following labs:    Most Recent 3 CBC's:Recent Labs   Lab Test 06/30/23  1252 06/23/23  0952 06/12/23  1547   WBC 5.4 3.8* 4.1   HGB 11.2* 11.8* 11.1*   MCV 92 94 89    367 325     Most Recent 3 BMP's:  Recent Labs   Lab Test 06/30/23  1252 06/26/23  1442 06/23/23  0952    138 140   POTASSIUM 3.9 3.9 4.2   CHLORIDE 106 106 107   CO2 20* 23 22   BUN 13.1 13.2 13.5   CR 1.25* 1.27* 1.18*   ANIONGAP 11 9 11   HARDEEP 9.1 8.6 8.9   * 108* 101*     Most Recent 2 LFT's:  Recent Labs   Lab Test 06/30/23  1252 06/26/23  1442   AST 26 30   ALT 18 17   ALKPHOS 56 54   BILITOTAL 0.4 0.4      Latest Reference Range & Units 04/11/23 14:45 05/01/23 17:03 05/08/23 12:47 06/05/23 07:15 06/05/23 11:57 06/12/23 15:47   Kappa Free Lt Chain 0.33 - 1.94 mg/dL 1.25 0.73 0.78 1.53 1.46 1.58   Kappa Lambda Ratio 0.26 - 1.65  0.87 1.16 1.59 2.13 (H) 1.74 (H) 2.00 (H)   Lambda Free Lt Chain 0.57 - 2.63 mg/dL 1.44 0.63 0.49 (L) 0.72 0.84 0.79   Monoclonal Peak <=0.0 g/dL 0.0 0.0 0.0  0.1 (H) 0.1 (H)   (H): Data is abnormally high  (L): Data is abnormally low    Assessment and Plan  Logan has multiple myeloma with high risk cytogenetics. He got Vidya-RVD with VGPR, then underwent autologous transplant 11/11/21. On acyclovir daily for viral ppx and bactrim M/T for PJP ppx previously  held both of these due to elevated creatinine. Given his high risk cytogenetics he started vidya + revlimid maintenance therapy 2/21/22.  He had severe flu-like effects from his first dose of Zometa but he has been tolerating monthly Zometa dosed at 3 mg. Continue Vit D. Continues on PO revlimid daily.   - No longer in need of Bactrim as he is > 1 year post-transplant.  -  C18D1 Daratumumab today  (6/5) and continue Revlimid 10 mg daily   -  Zometa monthly  - RTC q4 weeks with treatment    Elevated creatinine-- stable  Baseline creatinine 1.0-1.1  Creatinine elevation to 1.27 was noted on hospital admission 4/19/23 did not respond to IV fluid while inpatient.  Creatinine peaked at 1.47, now back WNL on most recent lab check.  CT with contrast from 4/19/23 with no obstruction, calculus, or hydronephrosis. UA 4/19 with proteinuria.  - Nephrology consulted-- OP visit 6/5. Myeloma labs continue to show an ongoing remission, so unlikely to be contributing. No signs or symptoms of dehydration to suggest pre-renal concern.   - Decreased acyclovir to 400 mg BID, patient was taking 800 mg BID (BMT dosing) -- per nephrology recs  - Estimated Creatinine Clearance: 66.6 mL/min (A) (based on SCr of 1.25 mg/dL (H)).     Hypogammaglobulinemia Start monthly IV Ig whenever IgG <300 (consider increasing to < 400 due to recurrent infections).    - IgG levels 6/12 = 550    Normocytic Anemia: likely d/t revlimid and daratumumab. Will keep monitoring and dose reduce if needed.    - Stable, Hgb 11.2 today     Immunizations  Received primary COVID series summer 2022.  Bivalent booster 5/8/23    HTN  Amlodipine  Stopped lisinopril per neph        30 minutes spent on the date of the encounter doing chart review, review of test results, patient visit and documentation     Georgina Cabezas [facility]-BC

## 2023-06-30 NOTE — NURSING NOTE
Chief Complaint   Patient presents with     Labs Only     Venipuncture, vitals checked     Alyce Gibbs RN on 6/30/2023 at 12:59 PM

## 2023-06-30 NOTE — NURSING NOTE
"Oncology Rooming Note    June 30, 2023 1:21 PM   June Ronquillo is a 53 year old male who presents for:    Chief Complaint   Patient presents with     Labs Only     Venipuncture, vitals checked     Oncology Clinic Visit     Multiple myeloma not having achieved remission (H) (Primary Dx)      Initial Vitals: BP (!) 146/71   Pulse 81   Temp 97.8  F (36.6  C)   Resp 16   Wt 70.5 kg (155 lb 6.4 oz)   SpO2 100%   BMI 25.08 kg/m   Estimated body mass index is 25.08 kg/m  as calculated from the following:    Height as of 6/23/23: 1.676 m (5' 6\").    Weight as of this encounter: 70.5 kg (155 lb 6.4 oz). Body surface area is 1.81 meters squared.  No Pain (0) Comment: Data Unavailable   No LMP for male patient.  Allergies reviewed: Yes  Medications reviewed: Yes    Medications: Medication refills not needed today.  Pharmacy name entered into [employer]:    [facility] [place], [place] - [address]  [facility] - [place], [place] - [address] [facility] [place], [place] - [address]  [facility] [place], [place] - [address] [facility] [facility]    Clinical concerns:  none      Candice Ruano            "

## 2023-07-01 DIAGNOSIS — C90.00 MULTIPLE MYELOMA NOT HAVING ACHIEVED REMISSION (H): Primary | ICD-10-CM

## 2023-07-01 RX ORDER — DEXAMETHASONE 4 MG/1
12 TABLET ORAL ONCE
Status: CANCELLED
Start: 2023-08-28 | End: 2023-08-28

## 2023-07-01 RX ORDER — METHYLPREDNISOLONE SODIUM SUCCINATE 125 MG/2ML
125 INJECTION, POWDER, LYOPHILIZED, FOR SOLUTION INTRAMUSCULAR; INTRAVENOUS
Status: CANCELLED
Start: 2023-07-31

## 2023-07-01 RX ORDER — EPINEPHRINE 1 MG/ML
0.3 INJECTION, SOLUTION INTRAMUSCULAR; SUBCUTANEOUS EVERY 5 MIN PRN
Status: CANCELLED | OUTPATIENT
Start: 2023-08-28

## 2023-07-01 RX ORDER — ALBUTEROL SULFATE 0.83 MG/ML
2.5 SOLUTION RESPIRATORY (INHALATION)
Status: CANCELLED | OUTPATIENT
Start: 2023-08-28

## 2023-07-01 RX ORDER — DIPHENHYDRAMINE HYDROCHLORIDE 50 MG/ML
50 INJECTION INTRAMUSCULAR; INTRAVENOUS
Status: CANCELLED
Start: 2023-08-28

## 2023-07-01 RX ORDER — ALBUTEROL SULFATE 90 UG/1
1-2 AEROSOL, METERED RESPIRATORY (INHALATION)
Status: CANCELLED
Start: 2023-07-31

## 2023-07-01 RX ORDER — ACETAMINOPHEN 325 MG/1
650 TABLET ORAL ONCE
Status: CANCELLED
Start: 2023-07-31 | End: 2023-07-31

## 2023-07-01 RX ORDER — HEPARIN SODIUM,PORCINE 10 UNIT/ML
5 VIAL (ML) INTRAVENOUS
Status: CANCELLED | OUTPATIENT
Start: 2023-09-15

## 2023-07-01 RX ORDER — HEPARIN SODIUM (PORCINE) LOCK FLUSH IV SOLN 100 UNIT/ML 100 UNIT/ML
5 SOLUTION INTRAVENOUS
Status: CANCELLED | OUTPATIENT
Start: 2023-07-31

## 2023-07-01 RX ORDER — ALBUTEROL SULFATE 90 UG/1
1-2 AEROSOL, METERED RESPIRATORY (INHALATION)
Status: CANCELLED
Start: 2023-08-28

## 2023-07-01 RX ORDER — DIPHENHYDRAMINE HCL 25 MG
50 CAPSULE ORAL ONCE
Status: CANCELLED
Start: 2023-09-15 | End: 2023-09-25

## 2023-07-01 RX ORDER — HEPARIN SODIUM,PORCINE 10 UNIT/ML
5 VIAL (ML) INTRAVENOUS
Status: CANCELLED | OUTPATIENT
Start: 2023-08-28

## 2023-07-01 RX ORDER — ALBUTEROL SULFATE 90 UG/1
1-2 AEROSOL, METERED RESPIRATORY (INHALATION)
Status: CANCELLED
Start: 2023-09-15

## 2023-07-01 RX ORDER — EPINEPHRINE 1 MG/ML
0.3 INJECTION, SOLUTION INTRAMUSCULAR; SUBCUTANEOUS EVERY 5 MIN PRN
Status: CANCELLED | OUTPATIENT
Start: 2023-09-15

## 2023-07-01 RX ORDER — ACETAMINOPHEN 325 MG/1
650 TABLET ORAL ONCE
Status: CANCELLED
Start: 2023-08-28 | End: 2023-08-28

## 2023-07-01 RX ORDER — DIPHENHYDRAMINE HCL 25 MG
50 CAPSULE ORAL ONCE
Status: CANCELLED
Start: 2023-08-28 | End: 2023-08-28

## 2023-07-01 RX ORDER — MEPERIDINE HYDROCHLORIDE 25 MG/ML
25 INJECTION INTRAMUSCULAR; INTRAVENOUS; SUBCUTANEOUS EVERY 30 MIN PRN
Status: CANCELLED | OUTPATIENT
Start: 2023-09-15

## 2023-07-01 RX ORDER — NALOXONE HYDROCHLORIDE 0.4 MG/ML
0.2 INJECTION, SOLUTION INTRAMUSCULAR; INTRAVENOUS; SUBCUTANEOUS
Status: CANCELLED | OUTPATIENT
Start: 2023-07-31

## 2023-07-01 RX ORDER — MEPERIDINE HYDROCHLORIDE 25 MG/ML
25 INJECTION INTRAMUSCULAR; INTRAVENOUS; SUBCUTANEOUS EVERY 30 MIN PRN
Status: CANCELLED | OUTPATIENT
Start: 2023-07-31

## 2023-07-01 RX ORDER — NALOXONE HYDROCHLORIDE 0.4 MG/ML
0.2 INJECTION, SOLUTION INTRAMUSCULAR; INTRAVENOUS; SUBCUTANEOUS
Status: CANCELLED | OUTPATIENT
Start: 2023-08-28

## 2023-07-01 RX ORDER — EPINEPHRINE 1 MG/ML
0.3 INJECTION, SOLUTION INTRAMUSCULAR; SUBCUTANEOUS EVERY 5 MIN PRN
Status: CANCELLED | OUTPATIENT
Start: 2023-07-31

## 2023-07-01 RX ORDER — NALOXONE HYDROCHLORIDE 0.4 MG/ML
0.2 INJECTION, SOLUTION INTRAMUSCULAR; INTRAVENOUS; SUBCUTANEOUS
Status: CANCELLED | OUTPATIENT
Start: 2023-09-15

## 2023-07-01 RX ORDER — METHYLPREDNISOLONE SODIUM SUCCINATE 125 MG/2ML
125 INJECTION, POWDER, LYOPHILIZED, FOR SOLUTION INTRAMUSCULAR; INTRAVENOUS
Status: CANCELLED
Start: 2023-08-28

## 2023-07-01 RX ORDER — ALBUTEROL SULFATE 0.83 MG/ML
2.5 SOLUTION RESPIRATORY (INHALATION)
Status: CANCELLED | OUTPATIENT
Start: 2023-09-15

## 2023-07-01 RX ORDER — MEPERIDINE HYDROCHLORIDE 25 MG/ML
25 INJECTION INTRAMUSCULAR; INTRAVENOUS; SUBCUTANEOUS EVERY 30 MIN PRN
Status: CANCELLED | OUTPATIENT
Start: 2023-08-28

## 2023-07-01 RX ORDER — DEXAMETHASONE 4 MG/1
12 TABLET ORAL ONCE
Status: CANCELLED
Start: 2023-09-15 | End: 2023-09-25

## 2023-07-01 RX ORDER — HEPARIN SODIUM (PORCINE) LOCK FLUSH IV SOLN 100 UNIT/ML 100 UNIT/ML
5 SOLUTION INTRAVENOUS
Status: CANCELLED | OUTPATIENT
Start: 2023-08-28

## 2023-07-01 RX ORDER — DEXAMETHASONE 4 MG/1
12 TABLET ORAL ONCE
Status: CANCELLED
Start: 2023-07-31 | End: 2023-07-31

## 2023-07-01 RX ORDER — HEPARIN SODIUM,PORCINE 10 UNIT/ML
5 VIAL (ML) INTRAVENOUS
Status: CANCELLED | OUTPATIENT
Start: 2023-07-31

## 2023-07-01 RX ORDER — ALBUTEROL SULFATE 0.83 MG/ML
2.5 SOLUTION RESPIRATORY (INHALATION)
Status: CANCELLED | OUTPATIENT
Start: 2023-07-31

## 2023-07-01 RX ORDER — DIPHENHYDRAMINE HYDROCHLORIDE 50 MG/ML
50 INJECTION INTRAMUSCULAR; INTRAVENOUS
Status: CANCELLED
Start: 2023-07-31

## 2023-07-01 RX ORDER — HEPARIN SODIUM (PORCINE) LOCK FLUSH IV SOLN 100 UNIT/ML 100 UNIT/ML
5 SOLUTION INTRAVENOUS
Status: CANCELLED | OUTPATIENT
Start: 2023-09-15

## 2023-07-01 RX ORDER — METHYLPREDNISOLONE SODIUM SUCCINATE 125 MG/2ML
125 INJECTION, POWDER, LYOPHILIZED, FOR SOLUTION INTRAMUSCULAR; INTRAVENOUS
Status: CANCELLED
Start: 2023-09-15

## 2023-07-01 RX ORDER — DIPHENHYDRAMINE HCL 25 MG
50 CAPSULE ORAL ONCE
Status: CANCELLED
Start: 2023-07-31 | End: 2023-07-31

## 2023-07-01 RX ORDER — ACETAMINOPHEN 325 MG/1
650 TABLET ORAL ONCE
Status: CANCELLED
Start: 2023-09-15 | End: 2023-09-25

## 2023-07-01 RX ORDER — DIPHENHYDRAMINE HYDROCHLORIDE 50 MG/ML
50 INJECTION INTRAMUSCULAR; INTRAVENOUS
Status: CANCELLED
Start: 2023-09-15

## 2023-07-03 LAB
ALBUMIN SERPL ELPH-MCNC: 4 G/DL (ref 3.7–5.1)
ALPHA1 GLOB SERPL ELPH-MCNC: 0.3 G/DL (ref 0.2–0.4)
ALPHA2 GLOB SERPL ELPH-MCNC: 0.5 G/DL (ref 0.5–0.9)
B-GLOBULIN SERPL ELPH-MCNC: 0.7 G/DL (ref 0.6–1)
GAMMA GLOB SERPL ELPH-MCNC: 0.5 G/DL (ref 0.7–1.6)
IGA SERPL-MCNC: 58 MG/DL (ref 84–499)
IGG SERPL-MCNC: 498 MG/DL (ref 610–1616)
IGM SERPL-MCNC: 28 MG/DL (ref 35–242)
KAPPA LC FREE SER-MCNC: 1.03 MG/DL (ref 0.33–1.94)
KAPPA LC FREE/LAMBDA FREE SER NEPH: 1.43 {RATIO} (ref 0.26–1.65)
LAMBDA LC FREE SERPL-MCNC: 0.72 MG/DL (ref 0.57–2.63)
M PROTEIN SERPL ELPH-MCNC: 0 G/DL
PROT PATTERN SERPL ELPH-IMP: ABNORMAL
PROT PATTERN SERPL IFE-IMP: NORMAL

## 2023-07-05 DIAGNOSIS — D80.1 HYPOGAMMAGLOBULINEMIA (H): Primary | ICD-10-CM

## 2023-07-06 DIAGNOSIS — C90.00 MULTIPLE MYELOMA NOT HAVING ACHIEVED REMISSION (H): Primary | ICD-10-CM

## 2023-07-06 RX ORDER — LENALIDOMIDE 10 MG/1
10 CAPSULE ORAL DAILY
Qty: 28 CAPSULE | Refills: 0 | Status: CANCELLED | OUTPATIENT
Start: 2023-07-31

## 2023-07-06 RX ORDER — LENALIDOMIDE 10 MG/1
10 CAPSULE ORAL DAILY
Qty: 28 CAPSULE | Refills: 0 | Status: SHIPPED | OUTPATIENT
Start: 2023-07-06 | End: 2023-10-06

## 2023-07-07 ENCOUNTER — LAB (OUTPATIENT)
Dept: LAB | Facility: CLINIC | Age: 53
End: 2023-07-07
Payer: COMMERCIAL

## 2023-07-07 ENCOUNTER — OFFICE VISIT (OUTPATIENT)
Dept: ENDOCRINOLOGY | Facility: CLINIC | Age: 53
End: 2023-07-07
Payer: COMMERCIAL

## 2023-07-07 VITALS — DIASTOLIC BLOOD PRESSURE: 78 MMHG | HEART RATE: 74 BPM | OXYGEN SATURATION: 99 % | SYSTOLIC BLOOD PRESSURE: 116 MMHG

## 2023-07-07 DIAGNOSIS — C73 PAPILLARY MICROCARCINOMA OF THYROID (H): Primary | ICD-10-CM

## 2023-07-07 DIAGNOSIS — R53.83 FATIGUE, UNSPECIFIED TYPE: ICD-10-CM

## 2023-07-07 DIAGNOSIS — E06.3 HYPOTHYROIDISM DUE TO HASHIMOTO'S THYROIDITIS: ICD-10-CM

## 2023-07-07 LAB
FSH SERPL IRP2-ACNC: 13.3 MIU/ML (ref 1.5–12.4)
LH SERPL-ACNC: 10 MIU/ML (ref 1.7–8.6)
SHBG SERPL-SCNC: 17 NMOL/L (ref 11–80)

## 2023-07-07 PROCEDURE — 84270 ASSAY OF SEX HORMONE GLOBUL: CPT | Performed by: INTERNAL MEDICINE

## 2023-07-07 PROCEDURE — 99214 OFFICE O/P EST MOD 30 MIN: CPT | Performed by: INTERNAL MEDICINE

## 2023-07-07 PROCEDURE — 36415 COLL VENOUS BLD VENIPUNCTURE: CPT | Performed by: PATHOLOGY

## 2023-07-07 PROCEDURE — 84403 ASSAY OF TOTAL TESTOSTERONE: CPT | Performed by: INTERNAL MEDICINE

## 2023-07-07 PROCEDURE — 99000 SPECIMEN HANDLING OFFICE-LAB: CPT | Performed by: PATHOLOGY

## 2023-07-07 PROCEDURE — 83001 ASSAY OF GONADOTROPIN (FSH): CPT | Performed by: INTERNAL MEDICINE

## 2023-07-07 PROCEDURE — 83002 ASSAY OF GONADOTROPIN (LH): CPT | Performed by: INTERNAL MEDICINE

## 2023-07-07 NOTE — PATIENT INSTRUCTIONS
-Follow-up pending testosterone level; I will update with results  -Continue levothyroxine without changes  -Neck ultrasound and labs end of December 2023/early January 2024--neck ultrasound to be completed at the [facility] in [place]   -follow-up in one year in person  -We will communicate results via [employer], or if needed by phone

## 2023-07-07 NOTE — PROGRESS NOTES
ENDOCRINOLOGY FOLLOW-UP         HISTORY OF PRESENT ILLNESS    June Ronquillo is seen for follow-up on the  issues outlined below.     1.  PTC.  Logan has not noted a change in the feel of his neck.  No dysphagia.  No hoarseness.    As before, neck ultrasound performed on 12/29/2022 showed no suspicious appearing lymph nodes.    2. Hypothyroidism.  He is taking levothyroxine 125 mcg daily.    Energy is fairly good.  He is under evaluation for rising creatinine: Acyclovir dose has been reduced.  With progressive reduction in his posttransplant medications, he has noted improvement in his energy level.    3.  Possible low testosterone.  Has had elevated gonadotropins and borderline testosterone levels.  As above, he finds his fatigue has been improving over the past several months.  Libido is also improved.    Pertinent endocrine and related history:  1. Hypothyroidism. Diagnosed in 1/2021, attributed to Hashimoto's thyroiditis.    2. PTC. During imaging for evaluation of multiple myeloma, he was incidentally discovered to have increased FDG uptake in thyroid nodules in 8/2021 and in 10/2021, with findings as below:  - PET/CT 8/31/2021: Hypermetabolic cervical lymph nodes, 3 separate hypermetabolic foci in the thyroid gland (2 in the left lobe, 1 in the right lobe).  CT appearance of nodules was hypoattenuating with a bulky calcification in one of the left thyroid lobe nodules.  SUV max of thyroid lesions 4.95.  - PET/CT 10/15/2021: Previous mildly enlarged FDG avid right upper cervical lymph nodes were resolved, likely inflammatory.  Few tiny thyroid nodules, similar in size, with decreased (now at most mild) uptake (SUV max 2.5).  -He does not have history of excessive head or neck radiation exposure.  No family history of thyroid disease/thyroid cancer.  -After initial visit in 10/2021, I referred patient for thyroid US which was performed on 10/28/2021. This showed bilateral thyroid nodules and heterogenous  thyroid parenchyma.  -He had FNA of left lobe nodule performed on 11/2/2021. Cytology returned consistent with papillary thyroid carcinoma.  -Detailed neck US performed 11/8/2021 showed no suspicious appearing cervical nodes.  -Underwent total thyroidectomy with left selective neck dissection to include levels 6 and 7 on 5/3/2022 with Dr. Sepulveda.  Surgical pathology was as follows:  A.  THYROID GLAND, TOTAL THYROIDECTOMY:  -PAPILLARY THYROID MICROCARCINOMA, conventional/classic type, 0.6 cm in greatest dimension.  -Tumor is located in the mid left lobe, encapsulated, and limited to thyroid gland (no extrathyroidal extension identified).  -Margins are negative for tumor (closest is anterior left lobe surface at 1 mm).  -Benign adenomatoid and colloid nodules up to 0.6 cm in greatest dimension.  -Chronic lymphocytic thyroiditis.  -Biopsy site changes.  -Fragments of parathyroid tissue with normal cellularity (adjacent to mid/lower right lobe).  -One perithyroid lymph node, negative for malignancy (0/1).  -AJCC pathologic staging is pT1a N0a.  -See comment and synoptic report.     B.  LEFT LEVEL 6 AND 7 NODE, EXCISION:  -Parathyroid gland with normal cellularity.  -No lymph node identified.  -Benign fibroadipose tissue.    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    3. Multiple myeloma. Diagnosed in 4/2021.  He was initiated on treatment with daratumumab, Velcade, Revlimid and dexamethasone.  S/p autologous PBSCT.   4. High risk for diabetes.    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    PAST MEDICAL HISTORY  Past Medical History:   Diagnosis Date     CAD (coronary artery disease)     s/p stent to CFX     Heart attack (H)      Hyperlipidemia LDL goal <100      Hypertension      Stented coronary artery      Thyroid disease        MEDICATIONS  Current Outpatient Medications   Medication Sig Dispense Refill     acetaminophen  (TYLENOL) 500 MG tablet Take 500-1,000 mg by mouth every 6 hours as needed for mild pain       acyclovir (ZOVIRAX) 400 MG tablet Take 1 tablet (400 mg) by mouth 2 times daily 60 tablet 3     aspirin (ASA) 81 MG EC tablet Take 1 tablet (81 mg) by mouth daily       calcium carbonate-vitamin D (OSCAL) 500-5 MG-MCG tablet TAKE ONE TABLET BY MOUTH ONCE DAILY 90 tablet 3     LENalidomide (REVLIMID) 10 MG CAPS capsule Take 1 capsule (10 mg) by mouth daily 28 capsule 0     LENalidomide (REVLIMID) 10 MG CAPS capsule Take 1 capsule (10 mg) by mouth daily 28 capsule 0     levothyroxine (SYNTHROID/LEVOTHROID) 125 MCG tablet Take 1 tablet (125 mcg) by mouth daily 90 tablet 2     Lidocaine (LIDOCARE) 4 % Patch Place 2 patches onto the skin every 24 hours To prevent lidocaine toxicity, patient should be patch free for 12 hrs daily. 30 patch 0     potassium chloride ER (KLOR-CON M) 10 MEQ CR tablet Take 2 tablets (20 mEq) by mouth daily 60 tablet 3     rosuvastatin (CRESTOR) 40 MG tablet Take 1 tablet (40 mg) by mouth daily 90 tablet 3     vitamin B complex with vitamin C (STRESS TAB) tablet Take 1 tablet by mouth daily 90 tablet 3       Allergies, family, and social history were reviewed and documented as needed in EHR.     REVIEW OF SYSTEMS  A focused ROS was performed, with pertinent positives and negatives as noted in the HPI.    PHYSICAL EXAM  /78 (BP Location: Right arm, Patient Position: Sitting, Cuff Size: Adult Regular)   Pulse 74   SpO2 99%   There is no height or weight on file to calculate BMI.  Constitutional: Patient is alert, oriented and appears in no acute distress.  Neck: Supple, no palpable thyroid tissue or neck masses.  Lymph nodes: No cervical lymphadenopathy.  Cardiovascular: Regular rate and rhythm with normal S1 and S2.  No audible murmurs.  Respiratory: Clear to auscultation bilaterally.  Neurologic: Alert and oriented x3.  No tremor.    DATA REVIEW  Each of the following laboratory and/or imaging  studies were reviewed.      Component      Latest Ref Rng 6/26/2023  2:42 PM   Free Testosterone Calculated      ng/dL 5.40    Testosterone Total      240 - 950 ng/dL 231 (L)    See Scanned Result THYROGLOBULIN AND ANTIBODY (SENDOUT TO [facility])-Scanned    Luteinizing Hormone      1.7 - 8.6 mIU/mL 9.9 (H)    TSH      0.30 - 4.20 uIU/mL 0.58    Hemoglobin A1C      <5.7 % 5.5    FSH      1.5 - 12.4 mIU/mL 13.6 (H)    Sex Hormone Binding Globulin      11 - 80 nmol/L 23      Component      Latest Ref Rng 7/7/2023  9:31 AM   Free Testosterone Calculated      ng/dL    Testosterone Total      240 - 950 ng/dL    See Scanned Result    Luteinizing Hormone      1.7 - 8.6 mIU/mL 10.0 (H)    TSH      0.30 - 4.20 uIU/mL    Hemoglobin A1C      <5.7 %    FSH      1.5 - 12.4 mIU/mL 13.3 (H)    Sex Hormone Binding Globulin      11 - 80 nmol/L       Legend:  (L) Low  (H) High          ASSESSMENT  1.  Papillary thyroid microcarcinoma.  Status post total thyroidectomy and left selective neck dissection.  T1a NX MX.  Surgical pathology indicates low risk of recurrence/persistent disease based on KRISTEN risk stratification system, therefore radioiodine therapy has been deferred to date.  PET CT of the neck on 12/5/2022 showed FDG uptake in level 2 cervical lymph nodes: I wonder if these may have been reactive since detailed neck ultrasound performed on 12/29/2022 showed no suspicious appearing lymph nodes.  Additionally, thyroglobulin panel in 6/2022 showed negative thyroglobulin antibodies with thyroglobulin level of less than 0.1 ng/mL; thyroglobulin panel remains similarly heartening on labs drawn prior to this visit in 6/2023, with negative thyroglobulin antibodies and thyroglobulin level less than 0.1 ng/mL.  Chest CT performed in 4/2023 (for abnormal PFT) does not show suspicious findings: Does have multiple lytic spinal and sternal lesions related to multiple myeloma.  Since there is not evidence of recurrent/persistent disease both  "structurally and biochemically, would follow carefully.  Anticipate detailed neck ultrasound in 12/20/2023 or 1/20/2024 (Logan may be traveling to [place]).    2.  Hypothyroidism.  Diagnosed in 1/2021, preceding thyroidectomy and likely due to Hashimoto's thyroiditis.  Given response to treatment (with low thyroglobulin level and no suspicious nodes on ultrasound) and given low KRISTEN risk status, our TSH goal is between lower limit of normal and 2.  Previsit TSH is in ideal range.  Continue current levothyroxine dose without changes.    3.  Elevated gonadotropins.  We have checked testosterone level given history of diminished libido and fatigue.  However, both of these symptoms have improved over the past several months.  Elevated gonadotropins persist, previsit testosterone is pending.  May represent \"subclinical\" or compensated hypogonadism.  If symptoms are improving and testosterone is in normal range, would follow carefully since there is limited data as to clinical significance of this finding.  If overtly low testosterone, would consider intervention and  also consider testicular ultrasound to rule out pathology.    4.  Multiple myeloma.  Following in oncology.  Status post chemotherapy and autologous PBSCT. On maintenance therapy and monthly Zometa.    5.  High risk for diabetes.  Previsit A1c is in normal range.    PLAN  -Follow-up pending testosterone level; I will update with results  -Continue levothyroxine without changes  -Neck ultrasound and labs end of December 2023/early January 2024--neck ultrasound to be completed at the [facility] in [place]   -follow-up in one year in person  -We will communicate results via [employer], or if needed by phone      Orders Placed This Encounter   Procedures     US Head Neck Soft Tissue     TSH with free T4 reflex     I spent a total of 32 minutes on the date of encounter reviewing medical records, evaluating the patient, coordinating care and documenting in the EHR, as " detailed above.        Makeda Elder MD   Division of Diabetes, Endocrinology and Metabolism  Department of Medicine

## 2023-07-07 NOTE — LETTER
7/7/2023         RE: June Ronquillo  [address]  [place] [facility] [address]        Dear Colleague,    Thank you for referring your patient, June Ronquillo, to the [facility]. Please see a copy of my visit note below.      ENDOCRINOLOGY FOLLOW-UP         HISTORY OF PRESENT ILLNESS    June Ronquillo is seen for follow-up on the  issues outlined below.     1.  PTC.  Logan has not noted a change in the feel of his neck.  No dysphagia.  No hoarseness.    As before, neck ultrasound performed on 12/29/2022 showed no suspicious appearing lymph nodes.    2. Hypothyroidism.  He is taking levothyroxine 125 mcg daily.    Energy is fairly good.  He is under evaluation for rising creatinine: Acyclovir dose has been reduced.  With progressive reduction in his posttransplant medications, he has noted improvement in his energy level.    3.  Possible low testosterone.  Has had elevated gonadotropins and borderline testosterone levels.  As above, he finds his fatigue has been improving over the past several months.  Libido is also improved.    Pertinent endocrine and related history:  1. Hypothyroidism. Diagnosed in 1/2021, attributed to Hashimoto's thyroiditis.    2. PTC. During imaging for evaluation of multiple myeloma, he was incidentally discovered to have increased FDG uptake in thyroid nodules in 8/2021 and in 10/2021, with findings as below:  - PET/CT 8/31/2021: Hypermetabolic cervical lymph nodes, 3 separate hypermetabolic foci in the thyroid gland (2 in the left lobe, 1 in the right lobe).  CT appearance of nodules was hypoattenuating with a bulky calcification in one of the left thyroid lobe nodules.  SUV max of thyroid lesions 4.95.  - PET/CT 10/15/2021: Previous mildly enlarged FDG avid right upper cervical lymph nodes were resolved, likely inflammatory.  Few tiny thyroid nodules, similar in size, with decreased (now at most mild) uptake (SUV max 2.5).  -He does not have  history of excessive head or neck radiation exposure.  No family history of thyroid disease/thyroid cancer.  -After initial visit in 10/2021, I referred patient for thyroid US which was performed on 10/28/2021. This showed bilateral thyroid nodules and heterogenous thyroid parenchyma.  -He had FNA of left lobe nodule performed on 11/2/2021. Cytology returned consistent with papillary thyroid carcinoma.  -Detailed neck US performed 11/8/2021 showed no suspicious appearing cervical nodes.  -Underwent total thyroidectomy with left selective neck dissection to include levels 6 and 7 on 5/3/2022 with Dr. Sepulveda.  Surgical pathology was as follows:  A.  THYROID GLAND, TOTAL THYROIDECTOMY:  -PAPILLARY THYROID MICROCARCINOMA, conventional/classic type, 0.6 cm in greatest dimension.  -Tumor is located in the mid left lobe, encapsulated, and limited to thyroid gland (no extrathyroidal extension identified).  -Margins are negative for tumor (closest is anterior left lobe surface at 1 mm).  -Benign adenomatoid and colloid nodules up to 0.6 cm in greatest dimension.  -Chronic lymphocytic thyroiditis.  -Biopsy site changes.  -Fragments of parathyroid tissue with normal cellularity (adjacent to mid/lower right lobe).  -One perithyroid lymph node, negative for malignancy (0/1).  -AJCC pathologic staging is pT1a N0a.  -See comment and synoptic report.     B.  LEFT LEVEL 6 AND 7 NODE, EXCISION:  -Parathyroid gland with normal cellularity.  -No lymph node identified.  -Benign fibroadipose tissue.    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    3. Multiple myeloma. Diagnosed in 4/2021.  He was initiated on treatment with daratumumab, Velcade, Revlimid and dexamethasone.  S/p autologous PBSCT.   4. High risk for diabetes.    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    PAST MEDICAL HISTORY  Past Medical History:   Diagnosis Date     CAD (coronary  artery disease)     s/p stent to CFX     Heart attack (H)      Hyperlipidemia LDL goal <100      Hypertension      Stented coronary artery      Thyroid disease        MEDICATIONS  Current Outpatient Medications   Medication Sig Dispense Refill     acetaminophen (TYLENOL) 500 MG tablet Take 500-1,000 mg by mouth every 6 hours as needed for mild pain       acyclovir (ZOVIRAX) 400 MG tablet Take 1 tablet (400 mg) by mouth 2 times daily 60 tablet 3     aspirin (ASA) 81 MG EC tablet Take 1 tablet (81 mg) by mouth daily       calcium carbonate-vitamin D (OSCAL) 500-5 MG-MCG tablet TAKE ONE TABLET BY MOUTH ONCE DAILY 90 tablet 3     LENalidomide (REVLIMID) 10 MG CAPS capsule Take 1 capsule (10 mg) by mouth daily 28 capsule 0     LENalidomide (REVLIMID) 10 MG CAPS capsule Take 1 capsule (10 mg) by mouth daily 28 capsule 0     levothyroxine (SYNTHROID/LEVOTHROID) 125 MCG tablet Take 1 tablet (125 mcg) by mouth daily 90 tablet 2     Lidocaine (LIDOCARE) 4 % Patch Place 2 patches onto the skin every 24 hours To prevent lidocaine toxicity, patient should be patch free for 12 hrs daily. 30 patch 0     potassium chloride ER (KLOR-CON M) 10 MEQ CR tablet Take 2 tablets (20 mEq) by mouth daily 60 tablet 3     rosuvastatin (CRESTOR) 40 MG tablet Take 1 tablet (40 mg) by mouth daily 90 tablet 3     vitamin B complex with vitamin C (STRESS TAB) tablet Take 1 tablet by mouth daily 90 tablet 3       Allergies, family, and social history were reviewed and documented as needed in EHR.     REVIEW OF SYSTEMS  A focused ROS was performed, with pertinent positives and negatives as noted in the HPI.    PHYSICAL EXAM  /78 (BP Location: Right arm, Patient Position: Sitting, Cuff Size: Adult Regular)   Pulse 74   SpO2 99%   There is no height or weight on file to calculate BMI.  Constitutional: Patient is alert, oriented and appears in no acute distress.  Neck: Supple, no palpable thyroid tissue or neck masses.  Lymph nodes: No cervical  lymphadenopathy.  Cardiovascular: Regular rate and rhythm with normal S1 and S2.  No audible murmurs.  Respiratory: Clear to auscultation bilaterally.  Neurologic: Alert and oriented x3.  No tremor.    DATA REVIEW  Each of the following laboratory and/or imaging studies were reviewed.      Component      Latest Ref Rng 6/26/2023  2:42 PM   Free Testosterone Calculated      ng/dL 5.40    Testosterone Total      240 - 950 ng/dL 231 (L)    See Scanned Result THYROGLOBULIN AND ANTIBODY (SENDOUT TO [facility])-Scanned    Luteinizing Hormone      1.7 - 8.6 mIU/mL 9.9 (H)    TSH      0.30 - 4.20 uIU/mL 0.58    Hemoglobin A1C      <5.7 % 5.5    FSH      1.5 - 12.4 mIU/mL 13.6 (H)    Sex Hormone Binding Globulin      11 - 80 nmol/L 23      Component      Latest Ref Rng 7/7/2023  9:31 AM   Free Testosterone Calculated      ng/dL    Testosterone Total      240 - 950 ng/dL    See Scanned Result    Luteinizing Hormone      1.7 - 8.6 mIU/mL 10.0 (H)    TSH      0.30 - 4.20 uIU/mL    Hemoglobin A1C      <5.7 %    FSH      1.5 - 12.4 mIU/mL 13.3 (H)    Sex Hormone Binding Globulin      11 - 80 nmol/L       Legend:  (L) Low  (H) High          ASSESSMENT  1.  Papillary thyroid microcarcinoma.  Status post total thyroidectomy and left selective neck dissection.  T1a NX MX.  Surgical pathology indicates low risk of recurrence/persistent disease based on KRISTEN risk stratification system, therefore radioiodine therapy has been deferred to date.  PET CT of the neck on 12/5/2022 showed FDG uptake in level 2 cervical lymph nodes: I wonder if these may have been reactive since detailed neck ultrasound performed on 12/29/2022 showed no suspicious appearing lymph nodes.  Additionally, thyroglobulin panel in 6/2022 showed negative thyroglobulin antibodies with thyroglobulin level of less than 0.1 ng/mL; thyroglobulin panel remains similarly heartening on labs drawn prior to this visit in 6/2023, with negative thyroglobulin antibodies and thyroglobulin  "level less than 0.1 ng/mL.  Chest CT performed in 4/2023 (for abnormal PFT) does not show suspicious findings: Does have multiple lytic spinal and sternal lesions related to multiple myeloma.  Since there is not evidence of recurrent/persistent disease both structurally and biochemically, would follow carefully.  Anticipate detailed neck ultrasound in 12/20/2023 or 1/20/2024 (Logan may be traveling to [place]).    2.  Hypothyroidism.  Diagnosed in 1/2021, preceding thyroidectomy and likely due to Hashimoto's thyroiditis.  Given response to treatment (with low thyroglobulin level and no suspicious nodes on ultrasound) and given low KRISTEN risk status, our TSH goal is between lower limit of normal and 2.  Previsit TSH is in ideal range.  Continue current levothyroxine dose without changes.    3.  Elevated gonadotropins.  We have checked testosterone level given history of diminished libido and fatigue.  However, both of these symptoms have improved over the past several months.  Elevated gonadotropins persist, previsit testosterone is pending.  May represent \"subclinical\" or compensated hypogonadism.  If symptoms are improving and testosterone is in normal range, would follow carefully since there is limited data as to clinical significance of this finding.  If overtly low testosterone, would consider intervention and  also consider testicular ultrasound to rule out pathology.    4.  Multiple myeloma.  Following in oncology.  Status post chemotherapy and autologous PBSCT. On maintenance therapy and monthly Zometa.    5.  High risk for diabetes.  Previsit A1c is in normal range.    PLAN  -Follow-up pending testosterone level; I will update with results  -Continue levothyroxine without changes  -Neck ultrasound and labs end of December 2023/early January 2024--neck ultrasound to [place] [facility] [place]   -follow-up in one year in person  -We will communicate results via [employer], or if needed by phone      Orders " Placed This Encounter   Procedures     US Head Neck Soft Tissue     TSH with free T4 reflex     I spent a total of 32 minutes on the date of encounter reviewing medical records, evaluating the patient, coordinating care and documenting in the EHR, as detailed above.        Deejay Elder MD   Division of Diabetes, Endocrinology and Metabolism  Department of Medicine              Again, thank you for allowing me to participate in the care of your patient.        Sincerely,        DEEJAY Elder MD

## 2023-07-11 LAB
TESTOST FREE SERPL-MCNC: 6.03 NG/DL
TESTOST SERPL-MCNC: 226 NG/DL (ref 240–950)

## 2023-07-14 ENCOUNTER — TELEPHONE (OUTPATIENT)
Dept: ONCOLOGY | Facility: CLINIC | Age: 53
End: 2023-07-14
Payer: COMMERCIAL

## 2023-07-14 NOTE — TELEPHONE ENCOUNTER
Oral Chemotherapy Monitoring Program   Medication: Revlimid  Rx: 10mg PO daily on days 1 through 28 of 28 day cycle   Auth #: [id number]   Risk Category: Adult Male  Routine survey questions reviewed.   Rx to be Escribed to [facility]    Yu Goldberg  [facility]  Oncology Pharmacy Liaison   [email or web address]  [phone] (phone)  [phone] (fax)

## 2023-07-26 NOTE — PROGRESS NOTES
Jul 28, 2023  Oncologic Hx:   - 4/30/2021 M spike of 34.8 in urine.M spike in blood 0.2; IgG 702 with depressed IgA and IgM. Beta 2 microglobulin 2.7. Lambda  with K/L ratio of 0.01. WBC 11.1 with absolute eos of 1.0. hgb, plt, Cr (1.1), and albumin WNL.    -4/30/2021 CT abd/pelvis showed sclerotic marrow changes with numerous lytic appearing lesions throughout the imaged portions of the spine, pelvis and ribs.      -PET scan 5/11/2021 Numerous osteolytic lesions which predominantly demonstrate uptake below liver background levels. There is one intraosseous lesion in the left lateral 9th rib that demonstrates uptake above background, possibly due to nondisplaced fracture. Adjacent to this lesion is mild  hypermetabolism to the left pleura, with new small left pleural effusion, suspicious for malignant effusion versus posttraumatic effusion. Pleural uptake may represent extramedullary myeloma extending along the intercostal space versus inflammation.    - BM bx 5/17/2021 with flow showing 4.0% plasma cells which express CD19 (dim), CD38 (bright), CD45 (dim), , and monotypic cytoplasmic lambda immunoglobulin light chains but lack CD20 and CD56.  Hypercellular bone marrow for age (approximately 75% cellularity) with adequate trilineage hematopoiesis. Plasma cell infiltrate: Interstitial, lambda monotypic and representing approximately 60% the bone marrow cellularity, by  immunohistochemical stain. Cytpgenetics with 2 related hypodiploid clones. One with loss of Y, monosomy 13 and 14 (5%) and one with translocation of 8p and 14, derivative 16 with long arm replaced by extra copy 1q,monosomy 21. FISH showed gain of 1q, loss of 13 and 14, IGH-MYC fusion t(8:14)    - Started Miracle-RVd 21 day with velcade on days 1,8,15.     -Cycle 2 Miracle-RVd. Dose reduce dex to 80 mg d/t fatigue and stomach upset on dex days. Also having cough with basilar streaking on CXR  cou- 8/31/2021 BM bx -rare suspicious plasma cells  "in touch imprint. No increase in plasma cells by morph.    -8/31/2021 PET/CT Diffuse osteolytic lesions throughout the axial and appendicular skeleton. Increased sclerosis since prior exam 5/11/2021 without increased metabolism or size.  Hypermetabolic pretracheal lymph node as well as hypermetabolic level 2 lymph nodes within the right neck. These lymph nodes are likely reactive from autoimmune activation via medical therapy. Hypermetabolic subcentimeter nodules within the thyroid gland    - 11/11/2021 Autologous BMT    - 2/21/22 started maintenance with Revlimid 10 mg daily and daratumumab monthly; did Revlimid alone for C1 due to low IgG levels, added daratumumab starting C2      Interval Hx:   June is seen in clinic today for routine follow-up.   No new major complaints. Having some intermittent paresthesias in his arms/ hands typically when he has been laying down/ sitting for periods of time. This is intermittent. Left foot with stable numbness. No pain. No fevers or chills. Good appetite, weight is stable.   No fevers/ chills. No infectious. No headaches or dizziness.               Physical Exam:  /81 (BP Location: Right arm, Patient Position: Sitting, Cuff Size: Adult Regular)   Pulse 75   Temp 97.9  F (36.6  C) (Oral)   Resp 18   Ht 1.676 m (5' 5.98\")   Wt 70.6 kg (155 lb 9.6 oz)   SpO2 100%   BMI 25.13 kg/m    General: No acute distress.  HEENT: EOMI, PERRL. Sclerae are anicteric. Oral mucosa is pink and moist with no lesions or thrush.   Lymph: Neck is supple with no lymphadenopathy in the cervical or supraclavicular areas.   Heart: Did not assess  Lungs: Did not assess  Abdomen: Bowel sounds present, soft, nontender with no palpable hepatosplenomegaly or masses.   Extremities: No lower extremity edema noted bilaterally.   Neuro: Alert and oriented x3, CN grossly intact, steady gait  Skin: No rashes, petechiae, or bruising noted on exposed skin. Area of pruritis on outer left " thigh    Labs:  I personally reviewed the following labs:    Most Recent 3 CBC's:  Recent Labs   Lab Test 07/28/23  1213 06/30/23  1252 06/23/23  0952   WBC 5.3 5.4 3.8*   HGB 10.8* 11.2* 11.8*   MCV 91 92 94    304 367     Most Recent 3 BMP's:  Recent Labs   Lab Test 07/28/23  1213 06/30/23  1252 06/26/23  1442    137 138   POTASSIUM 4.2 3.9 3.9   CHLORIDE 109* 106 106   CO2 21* 20* 23   BUN 16.2 13.1 13.2   CR 1.41* 1.25* 1.27*   ANIONGAP 8 11 9   HARDEEP 7.9* 9.1 8.6   * 162* 108*     Most Recent 2 LFT's:  Recent Labs   Lab Test 07/28/23  1213 06/30/23  1252   AST 23 26   ALT 16 18   ALKPHOS 53 56   BILITOTAL 0.3 0.4      Latest Reference Range & Units 04/11/23 14:45 05/01/23 17:03 05/08/23 12:47 06/05/23 07:15 06/05/23 11:57 06/12/23 15:47   Kappa Free Lt Chain 0.33 - 1.94 mg/dL 1.25 0.73 0.78 1.53 1.46 1.58   Kappa Lambda Ratio 0.26 - 1.65  0.87 1.16 1.59 2.13 (H) 1.74 (H) 2.00 (H)   Lambda Free Lt Chain 0.57 - 2.63 mg/dL 1.44 0.63 0.49 (L) 0.72 0.84 0.79   Monoclonal Peak <=0.0 g/dL 0.0 0.0 0.0  0.1 (H) 0.1 (H)   (H): Data is abnormally high  (L): Data is abnormally low    Assessment and Plan  Logan has multiple myeloma with high risk cytogenetics. He got Vidya-RVD with VGPR, then underwent autologous transplant 11/11/21. On acyclovir daily for viral ppx and bactrim M/T for PJP ppx previously  held both of these due to elevated creatinine. Given his high risk cytogenetics he started vidya + revlimid maintenance therapy 2/21/22.  He had severe flu-like effects from his first dose of Zometa but he has been tolerating monthly Zometa dosed at 3 mg. Continue Vit D. Continues on PO revlimid daily.   - No longer in need of Bactrim as he is > 1 year post-transplant.  -  C19D1 Daratumumab today (7/28) and continue Revlimid 10 mg daily   -  Zometa monthly-- held today with hypocalcemia/ SrCr 1.4, continue supplement  - RTC q4 weeks with treatment    Elevated creatinine-- stable  Baseline creatinine 1.0-1.1   Creatinine elevation to 1.27 was noted on hospital admission 4/19/23 did not respond to IV fluid while inpatient.  Creatinine peaked at 1.47, now back WNL on most recent lab check.  CT with contrast from 4/19/23 with no obstruction, calculus, or hydronephrosis. UA 4/19 with proteinuria.  - Nephrology consulted-- OP visit 6/5. Myeloma labs continue to show an ongoing remission, so unlikely to be contributing. No signs or symptoms of dehydration to suggest pre-renal concern.   - Decreased acyclovir to 400 mg BID per neph (also past 1 year with BMT)  - Estimated Creatinine Clearance: 59.1 mL/min (A) (based on SCr of 1.41 mg/dL (H)).     Hypogammaglobulinemia Start monthly IV Ig whenever IgG <300 (consider increasing to < 400 due to recurrent infections).    - IgG levels 6/12 = 550    Normocytic Anemia: likely d/t revlimid and daratumumab. Will keep monitoring and dose reduce if needed.    - Stable, Hgb 10.8 today     Immunizations  Received primary COVID series summer 2022.  Bivalent booster 5/8/23    HTN  Amlodipine  Stopped lisinopril per neph        35 minutes spent on the date of the encounter doing chart review, review of test results, patient visit and documentation     Georgina MUNSONP-BC

## 2023-07-28 ENCOUNTER — ONCOLOGY VISIT (OUTPATIENT)
Dept: ONCOLOGY | Facility: CLINIC | Age: 53
End: 2023-07-28
Attending: NURSE PRACTITIONER
Payer: COMMERCIAL

## 2023-07-28 ENCOUNTER — APPOINTMENT (OUTPATIENT)
Dept: LAB | Facility: CLINIC | Age: 53
End: 2023-07-28
Attending: NURSE PRACTITIONER
Payer: COMMERCIAL

## 2023-07-28 ENCOUNTER — INFUSION THERAPY VISIT (OUTPATIENT)
Dept: ONCOLOGY | Facility: CLINIC | Age: 53
End: 2023-07-28
Attending: STUDENT IN AN ORGANIZED HEALTH CARE EDUCATION/TRAINING PROGRAM
Payer: COMMERCIAL

## 2023-07-28 VITALS
DIASTOLIC BLOOD PRESSURE: 81 MMHG | HEART RATE: 75 BPM | BODY MASS INDEX: 25.01 KG/M2 | WEIGHT: 155.6 LBS | TEMPERATURE: 97.9 F | SYSTOLIC BLOOD PRESSURE: 137 MMHG | OXYGEN SATURATION: 100 % | HEIGHT: 66 IN | RESPIRATION RATE: 18 BRPM

## 2023-07-28 DIAGNOSIS — C90.00 MULTIPLE MYELOMA NOT HAVING ACHIEVED REMISSION (H): Primary | ICD-10-CM

## 2023-07-28 DIAGNOSIS — C90.00 MULTIPLE MYELOMA NOT HAVING ACHIEVED REMISSION (H): ICD-10-CM

## 2023-07-28 LAB
ALBUMIN SERPL BCG-MCNC: 4 G/DL (ref 3.5–5.2)
ALP SERPL-CCNC: 53 U/L (ref 40–129)
ALT SERPL W P-5'-P-CCNC: 16 U/L (ref 0–70)
ANION GAP SERPL CALCULATED.3IONS-SCNC: 8 MMOL/L (ref 7–15)
AST SERPL W P-5'-P-CCNC: 23 U/L (ref 0–45)
BASOPHILS # BLD AUTO: 0 10E3/UL (ref 0–0.2)
BASOPHILS NFR BLD AUTO: 0 %
BILIRUB SERPL-MCNC: 0.3 MG/DL
BUN SERPL-MCNC: 16.2 MG/DL (ref 6–20)
CALCIUM SERPL-MCNC: 7.9 MG/DL (ref 8.6–10)
CHLORIDE SERPL-SCNC: 109 MMOL/L (ref 98–107)
CREAT SERPL-MCNC: 1.41 MG/DL (ref 0.67–1.17)
DEPRECATED HCO3 PLAS-SCNC: 21 MMOL/L (ref 22–29)
EOSINOPHIL # BLD AUTO: 0.8 10E3/UL (ref 0–0.7)
EOSINOPHIL NFR BLD AUTO: 16 %
ERYTHROCYTE [DISTWIDTH] IN BLOOD BY AUTOMATED COUNT: 15.9 % (ref 10–15)
GFR SERPL CREATININE-BSD FRML MDRD: 59 ML/MIN/1.73M2
GLUCOSE SERPL-MCNC: 104 MG/DL (ref 70–99)
HCT VFR BLD AUTO: 33.7 % (ref 40–53)
HGB BLD-MCNC: 10.8 G/DL (ref 13.3–17.7)
IMM GRANULOCYTES # BLD: 0 10E3/UL
IMM GRANULOCYTES NFR BLD: 0 %
LYMPHOCYTES # BLD AUTO: 1.9 10E3/UL (ref 0.8–5.3)
LYMPHOCYTES NFR BLD AUTO: 36 %
MCH RBC QN AUTO: 29.3 PG (ref 26.5–33)
MCHC RBC AUTO-ENTMCNC: 32 G/DL (ref 31.5–36.5)
MCV RBC AUTO: 91 FL (ref 78–100)
MONOCYTES # BLD AUTO: 0.6 10E3/UL (ref 0–1.3)
MONOCYTES NFR BLD AUTO: 11 %
NEUTROPHILS # BLD AUTO: 2 10E3/UL (ref 1.6–8.3)
NEUTROPHILS NFR BLD AUTO: 37 %
NRBC # BLD AUTO: 0 10E3/UL
NRBC BLD AUTO-RTO: 0 /100
PLATELET # BLD AUTO: 322 10E3/UL (ref 150–450)
POTASSIUM SERPL-SCNC: 4.2 MMOL/L (ref 3.4–5.3)
PROT SERPL-MCNC: 5.9 G/DL (ref 6.4–8.3)
RBC # BLD AUTO: 3.69 10E6/UL (ref 4.4–5.9)
SODIUM SERPL-SCNC: 138 MMOL/L (ref 136–145)
TOTAL PROTEIN SERUM FOR ELP: 5.5 G/DL (ref 6.4–8.3)
WBC # BLD AUTO: 5.3 10E3/UL (ref 4–11)

## 2023-07-28 PROCEDURE — 99214 OFFICE O/P EST MOD 30 MIN: CPT | Performed by: NURSE PRACTITIONER

## 2023-07-28 PROCEDURE — 250N000011 HC RX IP 250 OP 636: Mod: JZ | Performed by: STUDENT IN AN ORGANIZED HEALTH CARE EDUCATION/TRAINING PROGRAM

## 2023-07-28 PROCEDURE — 84155 ASSAY OF PROTEIN SERUM: CPT

## 2023-07-28 PROCEDURE — 84165 PROTEIN E-PHORESIS SERUM: CPT | Mod: TC | Performed by: STUDENT IN AN ORGANIZED HEALTH CARE EDUCATION/TRAINING PROGRAM

## 2023-07-28 PROCEDURE — G0463 HOSPITAL OUTPT CLINIC VISIT: HCPCS | Performed by: NURSE PRACTITIONER

## 2023-07-28 PROCEDURE — 36415 COLL VENOUS BLD VENIPUNCTURE: CPT

## 2023-07-28 PROCEDURE — 85025 COMPLETE CBC W/AUTO DIFF WBC: CPT

## 2023-07-28 PROCEDURE — 86334 IMMUNOFIX E-PHORESIS SERUM: CPT | Mod: 26

## 2023-07-28 PROCEDURE — 96401 CHEMO ANTI-NEOPL SQ/IM: CPT

## 2023-07-28 PROCEDURE — 86334 IMMUNOFIX E-PHORESIS SERUM: CPT | Performed by: STUDENT IN AN ORGANIZED HEALTH CARE EDUCATION/TRAINING PROGRAM

## 2023-07-28 PROCEDURE — 83521 IG LIGHT CHAINS FREE EACH: CPT

## 2023-07-28 PROCEDURE — 80053 COMPREHEN METABOLIC PANEL: CPT

## 2023-07-28 PROCEDURE — 82784 ASSAY IGA/IGD/IGG/IGM EACH: CPT

## 2023-07-28 PROCEDURE — 84165 PROTEIN E-PHORESIS SERUM: CPT | Mod: 26

## 2023-07-28 RX ADMIN — DARATUMUMAB AND HYALURONIDASE-FIHJ (HUMAN RECOMBINANT) 1800 MG: 1800; 30000 INJECTION SUBCUTANEOUS at 13:54

## 2023-07-28 ASSESSMENT — PAIN SCALES - GENERAL: PAINLEVEL: NO PAIN (0)

## 2023-07-28 NOTE — PROGRESS NOTES
Infusion Nursing Note:  June Ronquillo presents today for C20D1 Darzalex Faspro/Zometa.    Patient seen by provider today: Yes: Georgina Cabezas NP   present during visit today: Not Applicable.    Note: Instruction given to patient as per provider. Verbalized understanding. IB sent to .       Intravenous Access:  Peripheral IV placed.    Treatment Conditions:  Lab Results   Component Value Date    HGB 10.8 (L) 07/28/2023    WBC 5.3 07/28/2023    ANEU 4.7 03/13/2023    ANEUTAUTO 2.0 07/28/2023     07/28/2023        Lab Results   Component Value Date     07/28/2023    POTASSIUM 4.2 07/28/2023    MAG 2.6 (H) 04/28/2023    CR 1.41 (H) 07/28/2023    HARDEEP 7.9 (L) 07/28/2023    BILITOTAL 0.3 07/28/2023    ALBUMIN 4.0 07/28/2023    ALT 16 07/28/2023    AST 23 07/28/2023       Results reviewed, labs MET treatment parameters, ok to proceed with treatment.    TORB: 7/28/23/1330H/Georgina Cabezas NP/Talia Chandler RN/ Creatinine 1.41, no intervention needed. Please instruct patient to push fluids.     TORB: 7/28/23/1400H/ Georgina Cabezas NP/Talia Chandler RN/ Corrective calcium 7.9, Hold Zometa today. Please add Zometa infusion on 8/8 after provider visit. Continue with home calcium dose.     Post Infusion Assessment:  Patient tolerated injection without incident.  Blood return noted pre and post infusion.  Site patent and intact, free from redness, edema or discomfort.  No evidence of extravasations.  Access discontinued per protocol.       Discharge Plan:   Patient declined prescription refills.  Discharge instructions reviewed with: Patient.  Patient and/or family verbalized understanding of discharge instructions and all questions answered.  AVS to patient via [employer].  Patient will return 8/8/23 for next appointment.   Patient discharged in stable condition accompanied by: self.  Departure Mode: Ambulatory.      WILIAM PELAYO RN

## 2023-07-28 NOTE — LETTER
7/28/2023         RE: June Ronquillo  [address]  [facility] [address]        Dear Colleague,    Thank you for referring your patient, June Ronquillo, to the [facility]. Please see a copy of my visit note below.    Jul 28, 2023  Oncologic Hx:   - 4/30/2021 M spike of 34.8 in urine.M spike in blood 0.2; IgG 702 with depressed IgA and IgM. Beta 2 microglobulin 2.7. Lambda  with K/L ratio of 0.01. WBC 11.1 with absolute eos of 1.0. hgb, plt, Cr (1.1), and albumin WNL.    -4/30/2021 CT abd/pelvis showed sclerotic marrow changes with numerous lytic appearing lesions throughout the imaged portions of the spine, pelvis and ribs.      -PET scan 5/11/2021 Numerous osteolytic lesions which predominantly demonstrate uptake below liver background levels. There is one intraosseous lesion in the left lateral 9th rib that demonstrates uptake above background, possibly due to nondisplaced fracture. Adjacent to this lesion is mild  hypermetabolism to the left pleura, with new small left pleural effusion, suspicious for malignant effusion versus posttraumatic effusion. Pleural uptake may represent extramedullary myeloma extending along the intercostal space versus inflammation.    - BM bx 5/17/2021 with flow showing 4.0% plasma cells which express CD19 (dim), CD38 (bright), CD45 (dim), , and monotypic cytoplasmic lambda immunoglobulin light chains but lack CD20 and CD56.  Hypercellular bone marrow for age (approximately 75% cellularity) with adequate trilineage hematopoiesis. Plasma cell infiltrate: Interstitial, lambda monotypic and representing approximately 60% the bone marrow cellularity, by  immunohistochemical stain. Cytpgenetics with 2 related hypodiploid clones. One with loss of Y, monosomy 13 and 14 (5%) and one with translocation of 8p and 14, derivative 16 with long arm replaced by extra copy 1q,monosomy 21. FISH showed gain of 1q, loss of 13 and 14,  "IGH-MYC fusion t(8:14)    - Started Miracle-RVd 21 day with velcade on days 1,8,15.     -Cycle 2 Miracle-RVd. Dose reduce dex to 80 mg d/t fatigue and stomach upset on dex days. Also having cough with basilar streaking on CXR  cou- 8/31/2021 BM bx -rare suspicious plasma cells in touch imprint. No increase in plasma cells by morph.    -8/31/2021 PET/CT Diffuse osteolytic lesions throughout the axial and appendicular skeleton. Increased sclerosis since prior exam 5/11/2021 without increased metabolism or size.  Hypermetabolic pretracheal lymph node as well as hypermetabolic level 2 lymph nodes within the right neck. These lymph nodes are likely reactive from autoimmune activation via medical therapy. Hypermetabolic subcentimeter nodules within the thyroid gland    - 11/11/2021 Autologous BMT    - 2/21/22 started maintenance with Revlimid 10 mg daily and daratumumab monthly; did Revlimid alone for C1 due to low IgG levels, added daratumumab starting C2      Interval Hx:   June is seen in clinic today for routine follow-up.   No new major complaints. Having some intermittent paresthesias in his arms/ hands typically when he has been laying down/ sitting for periods of time. This is intermittent. Left foot with stable numbness. No pain. No fevers or chills. Good appetite, weight is stable.   No fevers/ chills. No infectious. No headaches or dizziness.               Physical Exam:  /81 (BP Location: Right arm, Patient Position: Sitting, Cuff Size: Adult Regular)   Pulse 75   Temp 97.9  F (36.6  C) (Oral)   Resp 18   Ht 1.676 m (5' 5.98\")   Wt 70.6 kg (155 lb 9.6 oz)   SpO2 100%   BMI 25.13 kg/m    General: No acute distress.  HEENT: EOMI, PERRL. Sclerae are anicteric. Oral mucosa is pink and moist with no lesions or thrush.   Lymph: Neck is supple with no lymphadenopathy in the cervical or supraclavicular areas.   Heart: Did not assess  Lungs: Did not assess  Abdomen: Bowel sounds present, soft, nontender " with no palpable hepatosplenomegaly or masses.   Extremities: No lower extremity edema noted bilaterally.   Neuro: Alert and oriented x3, CN grossly intact, steady gait  Skin: No rashes, petechiae, or bruising noted on exposed skin. Area of pruritis on outer left thigh    Labs:  I personally reviewed the following labs:    Most Recent 3 CBC's:  Recent Labs   Lab Test 07/28/23  1213 06/30/23  1252 06/23/23  0952   WBC 5.3 5.4 3.8*   HGB 10.8* 11.2* 11.8*   MCV 91 92 94    304 367     Most Recent 3 BMP's:  Recent Labs   Lab Test 07/28/23  1213 06/30/23  1252 06/26/23  1442    137 138   POTASSIUM 4.2 3.9 3.9   CHLORIDE 109* 106 106   CO2 21* 20* 23   BUN 16.2 13.1 13.2   CR 1.41* 1.25* 1.27*   ANIONGAP 8 11 9   HARDEEP 7.9* 9.1 8.6   * 162* 108*     Most Recent 2 LFT's:  Recent Labs   Lab Test 07/28/23  1213 06/30/23  1252   AST 23 26   ALT 16 18   ALKPHOS 53 56   BILITOTAL 0.3 0.4      Latest Reference Range & Units 04/11/23 14:45 05/01/23 17:03 05/08/23 12:47 06/05/23 07:15 06/05/23 11:57 06/12/23 15:47   Kappa Free Lt Chain 0.33 - 1.94 mg/dL 1.25 0.73 0.78 1.53 1.46 1.58   Kappa Lambda Ratio 0.26 - 1.65  0.87 1.16 1.59 2.13 (H) 1.74 (H) 2.00 (H)   Lambda Free Lt Chain 0.57 - 2.63 mg/dL 1.44 0.63 0.49 (L) 0.72 0.84 0.79   Monoclonal Peak <=0.0 g/dL 0.0 0.0 0.0  0.1 (H) 0.1 (H)   (H): Data is abnormally high  (L): Data is abnormally low    Assessment and Plan  Logan has multiple myeloma with high risk cytogenetics. He got Vidya-RVD with VGPR, then underwent autologous transplant 11/11/21. On acyclovir daily for viral ppx and bactrim M/T for PJP ppx previously  held both of these due to elevated creatinine. Given his high risk cytogenetics he started vidya + revlimid maintenance therapy 2/21/22.  He had severe flu-like effects from his first dose of Zometa but he has been tolerating monthly Zometa dosed at 3 mg. Continue Vit D. Continues on PO revlimid daily.   - No longer in need of Bactrim as he is > 1  year post-transplant.  -  C19D1 Daratumumab today (7/28) and continue Revlimid 10 mg daily   -  Zometa monthly-- held today with hypocalcemia/ SrCr 1.4, continue supplement  - RTC q4 weeks with treatment    Elevated creatinine-- stable  Baseline creatinine 1.0-1.1  Creatinine elevation to 1.27 was noted on hospital admission 4/19/23 did not respond to IV fluid while inpatient.  Creatinine peaked at 1.47, now back WNL on most recent lab check.  CT with contrast from 4/19/23 with no obstruction, calculus, or hydronephrosis. UA 4/19 with proteinuria.  - Nephrology consulted-- OP visit 6/5. Myeloma labs continue to show an ongoing remission, so unlikely to be contributing. No signs or symptoms of dehydration to suggest pre-renal concern.   - Decreased acyclovir to 400 mg BID per neph (also past 1 year with BMT)  - Estimated Creatinine Clearance: 59.1 mL/min (A) (based on SCr of 1.41 mg/dL (H)).     Hypogammaglobulinemia Start monthly IV Ig whenever IgG <300 (consider increasing to < 400 due to recurrent infections).    - IgG levels 6/12 = 550    Normocytic Anemia: likely d/t revlimid and daratumumab. Will keep monitoring and dose reduce if needed.    - Stable, Hgb 10.8 today     Immunizations  Received primary COVID series summer 2022.  Bivalent booster 5/8/23    HTN  Amlodipine  Stopped lisinopril per neph        35 minutes spent on the date of the encounter doing chart review, review of test results, patient visit and documentation     Georgina Cabezas [facility]-BC

## 2023-07-31 LAB
ALBUMIN SERPL ELPH-MCNC: 3.5 G/DL (ref 3.7–5.1)
ALPHA1 GLOB SERPL ELPH-MCNC: 0.3 G/DL (ref 0.2–0.4)
ALPHA2 GLOB SERPL ELPH-MCNC: 0.5 G/DL (ref 0.5–0.9)
B-GLOBULIN SERPL ELPH-MCNC: 0.7 G/DL (ref 0.6–1)
GAMMA GLOB SERPL ELPH-MCNC: 0.4 G/DL (ref 0.7–1.6)
IGA SERPL-MCNC: 65 MG/DL (ref 84–499)
IGG SERPL-MCNC: 505 MG/DL (ref 610–1616)
IGM SERPL-MCNC: 26 MG/DL (ref 35–242)
KAPPA LC FREE SER-MCNC: 1.11 MG/DL (ref 0.33–1.94)
KAPPA LC FREE/LAMBDA FREE SER NEPH: 1.26 {RATIO} (ref 0.26–1.65)
LAMBDA LC FREE SERPL-MCNC: 0.88 MG/DL (ref 0.57–2.63)
M PROTEIN SERPL ELPH-MCNC: 0.1 G/DL
PROT PATTERN SERPL ELPH-IMP: ABNORMAL
PROT PATTERN SERPL IFE-IMP: NORMAL

## 2023-08-08 ENCOUNTER — ONCOLOGY VISIT (OUTPATIENT)
Dept: ONCOLOGY | Facility: CLINIC | Age: 53
End: 2023-08-08
Attending: STUDENT IN AN ORGANIZED HEALTH CARE EDUCATION/TRAINING PROGRAM
Payer: COMMERCIAL

## 2023-08-08 ENCOUNTER — APPOINTMENT (OUTPATIENT)
Dept: LAB | Facility: CLINIC | Age: 53
End: 2023-08-08
Attending: STUDENT IN AN ORGANIZED HEALTH CARE EDUCATION/TRAINING PROGRAM
Payer: COMMERCIAL

## 2023-08-08 VITALS
DIASTOLIC BLOOD PRESSURE: 81 MMHG | SYSTOLIC BLOOD PRESSURE: 130 MMHG | WEIGHT: 154.9 LBS | OXYGEN SATURATION: 100 % | TEMPERATURE: 98.5 F | RESPIRATION RATE: 16 BRPM | HEART RATE: 66 BPM | BODY MASS INDEX: 25.01 KG/M2

## 2023-08-08 DIAGNOSIS — C90.00 MULTIPLE MYELOMA NOT HAVING ACHIEVED REMISSION (H): Primary | ICD-10-CM

## 2023-08-08 DIAGNOSIS — C90.01 MULTIPLE MYELOMA IN REMISSION (H): Primary | ICD-10-CM

## 2023-08-08 LAB
ALBUMIN SERPL BCG-MCNC: 4.4 G/DL (ref 3.5–5.2)
ALP SERPL-CCNC: 61 U/L (ref 40–129)
ALT SERPL W P-5'-P-CCNC: 21 U/L (ref 0–70)
ANION GAP SERPL CALCULATED.3IONS-SCNC: 10 MMOL/L (ref 7–15)
AST SERPL W P-5'-P-CCNC: 36 U/L (ref 0–45)
BILIRUB SERPL-MCNC: 0.4 MG/DL
BUN SERPL-MCNC: 7.1 MG/DL (ref 6–20)
CALCIUM SERPL-MCNC: 9 MG/DL (ref 8.6–10)
CHLORIDE SERPL-SCNC: 108 MMOL/L (ref 98–107)
CREAT SERPL-MCNC: 1.15 MG/DL (ref 0.67–1.17)
DEPRECATED HCO3 PLAS-SCNC: 21 MMOL/L (ref 22–29)
GFR SERPL CREATININE-BSD FRML MDRD: 75 ML/MIN/1.73M2
GLUCOSE SERPL-MCNC: 103 MG/DL (ref 70–99)
POTASSIUM SERPL-SCNC: 4.1 MMOL/L (ref 3.4–5.3)
PROT SERPL-MCNC: 6.3 G/DL (ref 6.4–8.3)
SODIUM SERPL-SCNC: 139 MMOL/L (ref 136–145)

## 2023-08-08 PROCEDURE — 99215 OFFICE O/P EST HI 40 MIN: CPT | Performed by: STUDENT IN AN ORGANIZED HEALTH CARE EDUCATION/TRAINING PROGRAM

## 2023-08-08 PROCEDURE — 80053 COMPREHEN METABOLIC PANEL: CPT

## 2023-08-08 PROCEDURE — G0463 HOSPITAL OUTPT CLINIC VISIT: HCPCS | Mod: 25 | Performed by: STUDENT IN AN ORGANIZED HEALTH CARE EDUCATION/TRAINING PROGRAM

## 2023-08-08 PROCEDURE — 250N000011 HC RX IP 250 OP 636: Mod: JW | Performed by: STUDENT IN AN ORGANIZED HEALTH CARE EDUCATION/TRAINING PROGRAM

## 2023-08-08 PROCEDURE — 96365 THER/PROPH/DIAG IV INF INIT: CPT

## 2023-08-08 PROCEDURE — 36415 COLL VENOUS BLD VENIPUNCTURE: CPT

## 2023-08-08 PROCEDURE — 258N000003 HC RX IP 258 OP 636: Performed by: STUDENT IN AN ORGANIZED HEALTH CARE EDUCATION/TRAINING PROGRAM

## 2023-08-08 RX ADMIN — ZOLEDRONIC ACID 3 MG: 4 INJECTION, SOLUTION, CONCENTRATE INTRAVENOUS at 17:29

## 2023-08-08 ASSESSMENT — PAIN SCALES - GENERAL: PAINLEVEL: NO PAIN (0)

## 2023-08-08 NOTE — LETTER
8/8/2023         RE: June Ronquillo  [address]  [facility] [address]        Dear Colleague,    Thank you for referring your patient, June Ronquillo, to the [facility]. Please see a copy of my visit note below.      Oncologic Hx:   - 4/30/2021 M spike of 34.8 in urine.M spike in blood 0.2; IgG 702 with depressed IgA and IgM. Beta 2 microglobulin 2.7. Lambda  with K/L ratio of 0.01. WBC 11.1 with absolute eos of 1.0. hgb, plt, Cr (1.1), and albumin WNL.    -4/30/2021 CT abd/pelvis showed sclerotic marrow changes with numerous lytic appearing lesions throughout the imaged portions of the spine, pelvis and ribs.      -PET scan 5/11/2021 Numerous osteolytic lesions which predominantly demonstrate uptake below liver background levels. There is one intraosseous lesion in the left lateral 9th rib that demonstrates uptake above background, possibly due to nondisplaced fracture. Adjacent to this lesion is mild  hypermetabolism to the left pleura, with new small left pleural effusion, suspicious for malignant effusion versus posttraumatic effusion. Pleural uptake may represent extramedullary myeloma extending along the intercostal space versus inflammation.    - BM bx 5/17/2021 with flow showing 4.0% plasma cells which express CD19 (dim), CD38 (bright), CD45 (dim), , and monotypic cytoplasmic lambda immunoglobulin light chains but lack CD20 and CD56.  Hypercellular bone marrow for age (approximately 75% cellularity) with adequate trilineage hematopoiesis. Plasma cell infiltrate: Interstitial, lambda monotypic and representing approximately 60% the bone marrow cellularity, by  immunohistochemical stain. Cytpgenetics with 2 related hypodiploid clones. One with loss of Y, monosomy 13 and 14 (5%) and one with translocation of 8p and 14, derivative 16 with long arm replaced by extra copy 1q,monosomy 21. FISH showed gain of 1q, loss of 13 and 14, IGH-MYC fusion  t(8:14)    - Started Miracle-RVd 21 day with velcade on days 1,8,15.     -Cycle 2 Miracle-RVd. Dose reduce dex to 80 mg d/t fatigue and stomach upset on dex days. Also having cough with basilar streaking on CXR    - 8/31/2021 BM bx -rare suspicious plasma cells in touch imprint. No increase in plasma cells by morph.    -8/31/2021 PET/CT Diffuse osteolytic lesions throughout the axial and appendicular skeleton. Increased sclerosis since prior exam 5/11/2021 without increased metabolism or size.  Hypermetabolic pretracheal lymph node as well as hypermetabolic level 2 lymph nodes within the right neck. These lymph nodes are likely reactive from autoimmune activation via medical therapy. Hypermetabolic subcentimeter nodules within the thyroid gland    - 11/11/2021 Autologous BMT    - 2/21/22 started maintenance with Revlimid 10 mg daily and daratumumab monthly; did Revlimid alone for C1 due to low IgG levels, added daratumumab starting C2    Interval Hx:   June presents to clinic for follow up. No side effects from revlimid and imracle maintenance. He continues to work providing teacher seminars. He hopes to go to [employer] after he gets his second year vaccines. Comprehensive ROS negative    Physical Exam:  /81   Pulse 66   Temp 98.5  F (36.9  C) (Oral)   Resp 16   Wt 70.3 kg (154 lb 14.4 oz)   SpO2 100%   BMI 25.01 kg/m      Constitutional: NAD  Eyes: no scleral icterus  ENT: no oral lesions  Pulm: CTAB  CV: RRR, no m/r/g  GI: bowel sounds present, soft and nontender to palpation  MSK: No edema in the extremities  Neuro: alert, conversant, normal gait  Psych: appropriate mood and affect    Assessment and Plan  Logan has multiple myeloma with high risk cytogenetics. He got Miracle-RVD with VGPR, then underwent autologous transplant 11/11/21. Continue acyclovir daily for viral ppx and bactrim M/T for PJP ppx. Given his high risk cytogenetics he started miracle + revlimid maintenance therapy 2/21/22. Follow IgG and replete  if it remains <300. He had severe flu-like effects from his first dose of Zometa but he has been tolerating monthly Zometa dosed at 3 mg. Continue Vit D.   - continue vidya + revlimid today, labs stable. Continue zometa    Get clonoseq on upcoming BM bx and consider de-escalating from dual maintenance if negative      Elevated serum creatinine  This has normalized after he increased fluids    Hypogammaglobulinemia Start monthly IV Ig whenever IgG <300    Normocytic Anemia: likely d/t revlimid and daratumumab. Will keep monitoring and dose reduce if needed.      Barbi Vazquez MD PhD  40 min spent counseling the patient and coordinating care

## 2023-08-08 NOTE — NURSING NOTE
Chief Complaint   Patient presents with    Blood Draw     IV placement with blood draw by lab RN. Vitals taken and appointment arrived     Bibi Breaux RN

## 2023-08-08 NOTE — PROGRESS NOTES
Infusion Nursing Note:  June Ronquillo presents today for Zometa.    Patient spoke with provider today: Yes: Dr. Vazquez    Treatment Conditions:  Component      Latest Ref Rng 8/8/2023  3:27 PM   Sodium      136 - 145 mmol/L 139    Potassium      3.4 - 5.3 mmol/L 4.1    Chloride      98 - 107 mmol/L 108 (H)    Carbon Dioxide (CO2)      22 - 29 mmol/L 21 (L)    Anion Gap      7 - 15 mmol/L 10    Urea Nitrogen      6.0 - 20.0 mg/dL 7.1    Creatinine      0.67 - 1.17 mg/dL 1.15    Calcium      8.6 - 10.0 mg/dL 9.0    Glucose      70 - 99 mg/dL 103 (H)    Alkaline Phosphatase      40 - 129 U/L 61    AST      0 - 45 U/L 36    ALT      0 - 70 U/L 21    Protein Total      6.4 - 8.3 g/dL 6.3 (L)    Albumin      3.5 - 5.2 g/dL 4.4    Bilirubin Total      <=1.2 mg/dL 0.4    GFR Estimate      >60 mL/min/1.73m2 75           Note: Pt here for Zometa only today.  Was here on 7/28/23 for Cycle 20 Day 1 Darazalex and Zometa, but Zometa was held for hypocalcemia.  Today -Calcium 9.0, Albumin 4.4    Zometa dose at 3 mg IV due to previous flu-like symptoms with full dose, per pharmacy note.     Will return on 8/25/23 for Cycle 21 Day 1 Darzalex and Revlimid.    Intravenous Access:  Peripheral IV placed.    Post Infusion Assessment:  Patient tolerated infusion without incident.  Blood return noted pre and post infusion.  Access discontinued per protocol.    Discharge Plan:   Patient declined prescription refills.  Discharge instructions reviewed with: Patient.  Patient and/or family verbalized understanding of discharge instructions and all questions answered.  AVS to patient via [employer].  Patient will return 8/25/23 for next appointment.   Patient discharged in stable condition accompanied by: self.  Departure Mode: Ambulatory.    Liz Hdez RN

## 2023-08-08 NOTE — PROGRESS NOTES
Oncologic Hx:   - 4/30/2021 M spike of 34.8 in urine.M spike in blood 0.2; IgG 702 with depressed IgA and IgM. Beta 2 microglobulin 2.7. Lambda  with K/L ratio of 0.01. WBC 11.1 with absolute eos of 1.0. hgb, plt, Cr (1.1), and albumin WNL.    -4/30/2021 CT abd/pelvis showed sclerotic marrow changes with numerous lytic appearing lesions throughout the imaged portions of the spine, pelvis and ribs.      -PET scan 5/11/2021 Numerous osteolytic lesions which predominantly demonstrate uptake below liver background levels. There is one intraosseous lesion in the left lateral 9th rib that demonstrates uptake above background, possibly due to nondisplaced fracture. Adjacent to this lesion is mild  hypermetabolism to the left pleura, with new small left pleural effusion, suspicious for malignant effusion versus posttraumatic effusion. Pleural uptake may represent extramedullary myeloma extending along the intercostal space versus inflammation.    - BM bx 5/17/2021 with flow showing 4.0% plasma cells which express CD19 (dim), CD38 (bright), CD45 (dim), , and monotypic cytoplasmic lambda immunoglobulin light chains but lack CD20 and CD56.  Hypercellular bone marrow for age (approximately 75% cellularity) with adequate trilineage hematopoiesis. Plasma cell infiltrate: Interstitial, lambda monotypic and representing approximately 60% the bone marrow cellularity, by  immunohistochemical stain. Cytpgenetics with 2 related hypodiploid clones. One with loss of Y, monosomy 13 and 14 (5%) and one with translocation of 8p and 14, derivative 16 with long arm replaced by extra copy 1q,monosomy 21. FISH showed gain of 1q, loss of 13 and 14, IGH-MYC fusion t(8:14)    - Started Miracle-RVd 21 day with velcade on days 1,8,15.     -Cycle 2 Miracle-RVd. Dose reduce dex to 80 mg d/t fatigue and stomach upset on dex days. Also having cough with basilar streaking on CXR    - 8/31/2021 BM bx -rare suspicious plasma cells in touch  imprint. No increase in plasma cells by morph.    -8/31/2021 PET/CT Diffuse osteolytic lesions throughout the axial and appendicular skeleton. Increased sclerosis since prior exam 5/11/2021 without increased metabolism or size.  Hypermetabolic pretracheal lymph node as well as hypermetabolic level 2 lymph nodes within the right neck. These lymph nodes are likely reactive from autoimmune activation via medical therapy. Hypermetabolic subcentimeter nodules within the thyroid gland    - 11/11/2021 Autologous BMT    - 2/21/22 started maintenance with Revlimid 10 mg daily and daratumumab monthly; did Revlimid alone for C1 due to low IgG levels, added daratumumab starting C2    Interval Hx:   June presents to clinic for follow up. No side effects from revlimid and miracle maintenance. He continues to work providing teacher seminars. He hopes to go to [employer] after he gets his second year vaccines. Comprehensive ROS negative    Physical Exam:  /81   Pulse 66   Temp 98.5  F (36.9  C) (Oral)   Resp 16   Wt 70.3 kg (154 lb 14.4 oz)   SpO2 100%   BMI 25.01 kg/m      Constitutional: NAD  Eyes: no scleral icterus  ENT: no oral lesions  Pulm: CTAB  CV: RRR, no m/r/g  GI: bowel sounds present, soft and nontender to palpation  MSK: No edema in the extremities  Neuro: alert, conversant, normal gait  Psych: appropriate mood and affect    Assessment and Plan  Logan has multiple myeloma with high risk cytogenetics. He got Miracle-RVD with VGPR, then underwent autologous transplant 11/11/21. Continue acyclovir daily for viral ppx and bactrim M/T for PJP ppx. Given his high risk cytogenetics he started miracle + revlimid maintenance therapy 2/21/22. Follow IgG and replete if it remains <300. He had severe flu-like effects from his first dose of Zometa but he has been tolerating monthly Zometa dosed at 3 mg. Continue Vit D.   - continue miracle + revlimid today, labs stable. Continue zometa    Get clonoseq on upcoming BM bx and  consider de-escalating from dual maintenance if negative      Elevated serum creatinine  This has normalized after he increased fluids    Hypogammaglobulinemia Start monthly IV Ig whenever IgG <300    Normocytic Anemia: likely d/t revlimid and daratumumab. Will keep monitoring and dose reduce if needed.      Barbi Vazquez MD PhD  40 min spent counseling the patient and coordinating care

## 2023-08-08 NOTE — NURSING NOTE
"Oncology Rooming Note    August 8, 2023 3:35 PM   June Ronquillo is a 53 year old male who presents for:    Chief Complaint   Patient presents with    Blood Draw     IV placement with blood draw by lab RN. Vitals taken and appointment arrived    Oncology Clinic Visit     Multiple myeloma        Initial Vitals: /81   Pulse 66   Temp 98.5  F (36.9  C) (Oral)   Resp 16   Wt 70.3 kg (154 lb 14.4 oz)   SpO2 100%   BMI 25.01 kg/m   Estimated body mass index is 25.01 kg/m  as calculated from the following:    Height as of 7/28/23: 1.676 m (5' 5.98\").    Weight as of this encounter: 70.3 kg (154 lb 14.4 oz). Body surface area is 1.81 meters squared.  No Pain (0) Comment: Data Unavailable   No LMP for male patient.  Allergies reviewed: Yes  Medications reviewed: Yes    Medications: Medication refills not needed today.  Pharmacy name entered into [facility]:    [facility] [place], [place] - [phone]  [facility] [place], [place] - [address] [place] [address] [facility] [place] [place] - [phone]  [facility] [place], [place] - [address] [facility]    Clinical concerns: none.        Eleanor Gil CMA              "

## 2023-08-11 DIAGNOSIS — C90.00 MULTIPLE MYELOMA NOT HAVING ACHIEVED REMISSION (H): Primary | ICD-10-CM

## 2023-08-11 RX ORDER — LENALIDOMIDE 10 MG/1
10 CAPSULE ORAL DAILY
Qty: 28 CAPSULE | Refills: 0 | Status: SHIPPED | OUTPATIENT
Start: 2023-08-11 | End: 2023-08-23

## 2023-08-15 ENCOUNTER — TELEPHONE (OUTPATIENT)
Dept: ONCOLOGY | Facility: CLINIC | Age: 53
End: 2023-08-15
Payer: COMMERCIAL

## 2023-08-15 NOTE — ORAL ONC MGMT
Oral Chemotherapy Monitoring Program   Medication: Revlimid   Rx: 10mg PO daily on days 1 through 28 of 28 day cycle   Auth #: [id number]   Risk Category: Adult Male  Routine survey questions reviewed.   Rx to be E[facility]

## 2023-08-23 ENCOUNTER — VIRTUAL VISIT (OUTPATIENT)
Dept: ENDOCRINOLOGY | Facility: CLINIC | Age: 53
End: 2023-08-23
Payer: COMMERCIAL

## 2023-08-23 DIAGNOSIS — C73 PAPILLARY MICROCARCINOMA OF THYROID (H): ICD-10-CM

## 2023-08-23 DIAGNOSIS — R53.83 FATIGUE, UNSPECIFIED TYPE: ICD-10-CM

## 2023-08-23 DIAGNOSIS — R79.89 LOW TESTOSTERONE IN MALE: Primary | ICD-10-CM

## 2023-08-23 DIAGNOSIS — E06.3 HYPOTHYROIDISM DUE TO HASHIMOTO'S THYROIDITIS: ICD-10-CM

## 2023-08-23 PROCEDURE — 99215 OFFICE O/P EST HI 40 MIN: CPT | Mod: VID | Performed by: INTERNAL MEDICINE

## 2023-08-23 NOTE — LETTER
8/23/2023         RE: June Ronquillo  [address]  [facility] [id number]        Dear Colleague,    Thank you for referring your patient, June Ronquillo, to the [facility]. Please see a copy of my visit note below.      ENDOCRINOLOGY VIDEO FOLLOW-UP         HISTORY OF PRESENT ILLNESS    June Ronquillo is seen for follow-up via billable video visit.    I am seeing him earlier than planned due to ongoing fatigue and concern for hypogonadism.    His total testosterone has been low, free testosterone at the very low end of normal while gonadotropins have been elevated in a pattern suspicious for compensated hypogonadism, perhaps related to chemotherapy.    Continues to experience fatigue: Although he can do his work, he often finds that during times of intense busyness he will become fatigued after a few hours of work.  Has inconsistent change in erectile function, noting less early morning erections.  Libido is fairly normal.    He has hypothyroidism, for which he has been taking levothyroxine 125 mcg daily.    Pertinent endocrine and related history:  1. Hypothyroidism. Diagnosed in 1/2021, attributed to Hashimoto's thyroiditis.    2. PTC. During imaging for evaluation of multiple myeloma, he was incidentally discovered to have increased FDG uptake in thyroid nodules in 8/2021 and in 10/2021, with findings as below:  - PET/CT 8/31/2021: Hypermetabolic cervical lymph nodes, 3 separate hypermetabolic foci in the thyroid gland (2 in the left lobe, 1 in the right lobe).  CT appearance of nodules was hypoattenuating with a bulky calcification in one of the left thyroid lobe nodules.  SUV max of thyroid lesions 4.95.  - PET/CT 10/15/2021: Previous mildly enlarged FDG avid right upper cervical lymph nodes were resolved, likely inflammatory.  Few tiny thyroid nodules, similar in size, with decreased (now at most mild) uptake (SUV max 2.5).  -He does not have history of  excessive head or neck radiation exposure.  No family history of thyroid disease/thyroid cancer.  -After initial visit in 10/2021, I referred patient for thyroid US which was performed on 10/28/2021. This showed bilateral thyroid nodules and heterogenous thyroid parenchyma.  -He had FNA of left lobe nodule performed on 11/2/2021. Cytology returned consistent with papillary thyroid carcinoma.  -Detailed neck US performed 11/8/2021 showed no suspicious appearing cervical nodes.  -Underwent total thyroidectomy with left selective neck dissection to include levels 6 and 7 on 5/3/2022 with Dr. Sepulveda.  Surgical pathology was as follows:  A.  THYROID GLAND, TOTAL THYROIDECTOMY:  -PAPILLARY THYROID MICROCARCINOMA, conventional/classic type, 0.6 cm in greatest dimension.  -Tumor is located in the mid left lobe, encapsulated, and limited to thyroid gland (no extrathyroidal extension identified).  -Margins are negative for tumor (closest is anterior left lobe surface at 1 mm).  -Benign adenomatoid and colloid nodules up to 0.6 cm in greatest dimension.  -Chronic lymphocytic thyroiditis.  -Biopsy site changes.  -Fragments of parathyroid tissue with normal cellularity (adjacent to mid/lower right lobe).  -One perithyroid lymph node, negative for malignancy (0/1).  -AJCC pathologic staging is pT1a N0a.  -See comment and synoptic report.     B.  LEFT LEVEL 6 AND 7 NODE, EXCISION:  -Parathyroid gland with normal cellularity.  -No lymph node identified.  -Benign fibroadipose tissue.    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    3. Multiple myeloma. Diagnosed in 4/2021.  He was initiated on treatment with daratumumab, Velcade, Revlimid and dexamethasone.  S/p autologous PBSCT.   4. High risk for diabetes.    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    PAST MEDICAL HISTORY  Past Medical History:   Diagnosis Date     CAD (coronary artery disease)      s/p stent to CFX     Heart attack (H)      Hyperlipidemia LDL goal <100      Hypertension      Stented coronary artery      Thyroid disease        MEDICATIONS  Current Outpatient Medications   Medication Sig Dispense Refill     acetaminophen (TYLENOL) 500 MG tablet Take 500-1,000 mg by mouth every 6 hours as needed for mild pain       acyclovir (ZOVIRAX) 400 MG tablet Take 1 tablet (400 mg) by mouth 2 times daily 60 tablet 3     aspirin (ASA) 81 MG EC tablet Take 1 tablet (81 mg) by mouth daily       calcium carbonate-vitamin D (OSCAL) 500-5 MG-MCG tablet TAKE ONE TABLET BY MOUTH ONCE DAILY 90 tablet 3     LENalidomide (REVLIMID) 10 MG CAPS capsule Take 1 capsule (10 mg) by mouth daily 28 capsule 0     LENalidomide (REVLIMID) 10 MG CAPS capsule Take 1 capsule (10 mg) by mouth daily 28 capsule 0     LENalidomide (REVLIMID) 10 MG CAPS capsule Take 1 capsule (10 mg) by mouth daily 28 capsule 0     levothyroxine (SYNTHROID/LEVOTHROID) 125 MCG tablet Take 1 tablet (125 mcg) by mouth daily 90 tablet 2     Lidocaine (LIDOCARE) 4 % Patch Place 2 patches onto the skin every 24 hours To prevent lidocaine toxicity, patient should be patch free for 12 hrs daily. (Patient not taking: Reported on 8/8/2023) 30 patch 0     potassium chloride ER (KLOR-CON M) 10 MEQ CR tablet Take 2 tablets (20 mEq) by mouth daily 60 tablet 3     rosuvastatin (CRESTOR) 40 MG tablet Take 1 tablet (40 mg) by mouth daily 90 tablet 3     vitamin B complex with vitamin C (STRESS TAB) tablet Take 1 tablet by mouth daily 90 tablet 3       Allergies, family, and social history were reviewed and documented as needed in EHR.     REVIEW OF SYSTEMS  A focused ROS was performed, with pertinent positives and negatives as noted in the HPI.    PHYSICAL EXAM  There were no vitals taken for this visit.  There is no height or weight on file to calculate BMI.  Constitutional: Patient is alert, oriented and appears in no acute distress.  Eyes: Eyes grossly normal to  inspection, EOMI, no stare, lid lag, or retraction; no conjunctival injection.  ENMT: Lips are without lesions.   Neck: No visible goiter or neck mass.  Respiratory: No audible wheeze or cough. No visible cyanosis. No visible increased work of breathing.  Neurological: Alert and oriented times 3.  Cranial nerves grossly intact.        DATA REVIEW  Each of the following laboratory and/or imaging studies were reviewed.      Component      Latest Ref [facility] 6/26/2023  2:42 PM 7/7/2023  9:31 AM   Free Testosterone Calculated      ng/dL 5.40  6.03    Testosterone Total      240 - 950 ng/dL 231 (L)  226 (L)    Luteinizing Hormone      1.7 - 8.6 mIU/mL 9.9 (H)  10.0 (H)    FSH      1.5 - 12.4 mIU/mL 13.6 (H)  13.3 (H)    Sex Hormone Binding Globulin      11 - 80 nmol/L 23  17       Legend:  (L) Low  (H) High      Component      Latest Ref [facility] 7/28/2023  12:13 PM   WBC      4.0 - 11.0 10e3/uL 5.3    RBC Count      4.40 - 5.90 10e6/uL 3.69 (L)    Hemoglobin      13.3 - 17.7 g/dL 10.8 (L)    Hematocrit      40.0 - 53.0 % 33.7 (L)    MCV      78 - 100 fL 91    MCH      26.5 - 33.0 pg 29.3    MCHC      31.5 - 36.5 g/dL 32.0    RDW      10.0 - 15.0 % 15.9 (H)    Platelet Count      150 - 450 10e3/uL 322    % Neutrophils      % 37    % Lymphocytes      % 36    % Monocytes      % 11    % Eosinophils      % 16    % Basophils      % 0    % Immature Granulocytes      % 0    NRBCs per 100 WBC      <1 /100 0    Absolute Neutrophils      1.6 - 8.3 10e3/uL 2.0    Absolute Lymphocytes      0.8 - 5.3 10e3/uL 1.9    Absolute Monocytes      0.0 - 1.3 10e3/uL 0.6    Absolute Eosinophils      0.0 - 0.7 10e3/uL 0.8 (H)    Absolute Basophils      0.0 - 0.2 10e3/uL 0.0    Absolute Immature Granulocytes      <=0.4 10e3/uL 0.0    Absolute NRBCs      10e3/uL 0.0       Legend:  (L) Low  (H) High        ASSESSMENT  1.  Elevated gonadotropins.  Elevated gonadotropins, with low total testosterone (likely due to low SHBG) and very low normal free T4 suggest  compensated hypogonadism, perhaps due to chemotherapy.  Would recommend testicular ultrasound to confirm that there are no other testicular abnormalities.  Has nonspecific symptoms which could be related to gonadal status, although there is uncertainty surrounding the benefit of testosterone therapy in this scenario.  We have the option to follow carefully, but given history of chemotherapy and potential for progression of hypogonadism it would be reasonable to trial testosterone therapy to see if it helps improve some of his symptoms.  His oncologist is in agreement if we decide to move forward with testosterone therapy.  We reviewed benefits and potential side effects of testosterone therapy as well as precautions to take while treated with this medication; risks discussed include potential erythrocytosis, potential growth of an existing prostate malignancy and unclear data surrounding risk for cardiovascular disease with testosterone therapy.  We reviewed testosterone formulations and special considerations with the commonly available formulations. Logan would like to give more thought to option of treatment and will update me with his decision.  I will send him reading materials so that he can give more consideration to this option.    2.  Papillary thyroid microcarcinoma.  Status post total thyroidectomy and left selective neck dissection.  T1a NX MX.  Surgical pathology indicates low risk of recurrence/persistent disease based on KRISTEN risk stratification system, therefore radioiodine therapy has been deferred to date.  PET CT of the neck on 12/5/2022 showed FDG uptake in level 2 cervical lymph nodes: possible that these may have been reactive since detailed neck ultrasound performed on 12/29/2022 showed no suspicious appearing lymph nodes.  Additionally, thyroglobulin panel in 6/2022 showed negative thyroglobulin antibodies with thyroglobulin level of less than 0.1 ng/mL; thyroglobulin panel in 6/2023 showed  negative thyroglobulin antibodies and thyroglobulin level less than 0.1 ng/mL.  Chest CT performed in 4/2023 (for abnormal PFT) does not show suspicious findings: Does have multiple lytic spinal and sternal lesions related to multiple myeloma.  Since there is not evidence of recurrent/persistent disease both structurally and biochemically, would follow carefully.  Anticipate detailed neck ultrasound in 12/20/2023 or 1/20/2024.    3.  Hypothyroidism.  Diagnosed in 1/2021, preceding thyroidectomy and likely due to Hashimoto's thyroiditis.  Given response to treatment (with low thyroglobulin level and no suspicious nodes on ultrasound) and given low KRISTEN risk status, our TSH goal is between lower limit of normal and 2.  TSH in 6/2023 was in ideal range.  Continue current levothyroxine dose without changes.    4.  Multiple myeloma.  Following in oncology.  Status post chemotherapy and autologous PBSCT. On maintenance therapy and monthly Zometa.    5.  High risk for diabetes.  A1c 6/2023 range.    PLAN  -Labs on 9/1/2023  -Schedule testicular ultrasound  -Continue levothyroxine without changes  -We will mail reading materials on testosterone therapy  -Give some thought to the options we discussed and update me with preferences  -As planned before, schedule neck ultrasound and labs end of December 2023/early January 2024--neck ultrasound to be completed at the [facility] in [place]   -Follow-up as planned in July 2024, with labs before visit (we can coordinate closer follow-up if needed)  -We will communicate results via [employer], or if needed by phone      Orders Placed This Encounter   Procedures     US Testicular & Scrotum w Doppler Ltd     PSA, screen     Video start time: 7:33 AM  Video end time: 7:52 AM    Physician location: On site    Video platform: Kodi    I spent a total of 49 minutes on the date of encounter reviewing medical records, evaluating the patient, coordinating care and documenting in the EHR, as  detailed above.        Deejay Elder MD   Division of Diabetes, Endocrinology and Metabolism  Department of Medicine          Again, thank you for allowing me to participate in the care of your patient.        Sincerely,        DEEJAY Elder MD

## 2023-08-23 NOTE — PATIENT INSTRUCTIONS
-Labs on 9/1/2023  -Schedule testicular ultrasound  -Continue levothyroxine without changes  -We will mail reading materials on testosterone therapy  -Give some thought to the options we discussed and update me with preferences  -As planned before, schedule neck ultrasound and labs end of December 2023/early January 2024--neck ultrasound to be completed at the [facility] in [place]   -Follow-up as planned in July 2024, with labs before visit (we can coordinate closer follow-up if needed)  -We will communicate results via S B E, or if needed by phone

## 2023-08-23 NOTE — PROGRESS NOTES
ENDOCRINOLOGY VIDEO FOLLOW-UP         HISTORY OF PRESENT ILLNESS    June Ronquillo is seen for follow-up via billable video visit.    I am seeing him earlier than planned due to ongoing fatigue and concern for hypogonadism.    His total testosterone has been low, free testosterone at the very low end of normal while gonadotropins have been elevated in a pattern suspicious for compensated hypogonadism, perhaps related to chemotherapy.    Continues to experience fatigue: Although he can do his work, he often finds that during times of intense busyness he will become fatigued after a few hours of work.  Has inconsistent change in erectile function, noting less early morning erections.  Libido is fairly normal.    He has hypothyroidism, for which he has been taking levothyroxine 125 mcg daily.    Pertinent endocrine and related history:  1. Hypothyroidism. Diagnosed in 1/2021, attributed to Hashimoto's thyroiditis.    2. PTC. During imaging for evaluation of multiple myeloma, he was incidentally discovered to have increased FDG uptake in thyroid nodules in 8/2021 and in 10/2021, with findings as below:  - PET/CT 8/31/2021: Hypermetabolic cervical lymph nodes, 3 separate hypermetabolic foci in the thyroid gland (2 in the left lobe, 1 in the right lobe).  CT appearance of nodules was hypoattenuating with a bulky calcification in one of the left thyroid lobe nodules.  SUV max of thyroid lesions 4.95.  - PET/CT 10/15/2021: Previous mildly enlarged FDG avid right upper cervical lymph nodes were resolved, likely inflammatory.  Few tiny thyroid nodules, similar in size, with decreased (now at most mild) uptake (SUV max 2.5).  -He does not have history of excessive head or neck radiation exposure.  No family history of thyroid disease/thyroid cancer.  -After initial visit in 10/2021, I referred patient for thyroid US which was performed on 10/28/2021. This showed bilateral thyroid nodules and heterogenous thyroid  parenchyma.  -He had FNA of left lobe nodule performed on 11/2/2021. Cytology returned consistent with papillary thyroid carcinoma.  -Detailed neck US performed 11/8/2021 showed no suspicious appearing cervical nodes.  -Underwent total thyroidectomy with left selective neck dissection to include levels 6 and 7 on 5/3/2022 with Dr. Sepulveda.  Surgical pathology was as follows:  A.  THYROID GLAND, TOTAL THYROIDECTOMY:  -PAPILLARY THYROID MICROCARCINOMA, conventional/classic type, 0.6 cm in greatest dimension.  -Tumor is located in the mid left lobe, encapsulated, and limited to thyroid gland (no extrathyroidal extension identified).  -Margins are negative for tumor (closest is anterior left lobe surface at 1 mm).  -Benign adenomatoid and colloid nodules up to 0.6 cm in greatest dimension.  -Chronic lymphocytic thyroiditis.  -Biopsy site changes.  -Fragments of parathyroid tissue with normal cellularity (adjacent to mid/lower right lobe).  -One perithyroid lymph node, negative for malignancy (0/1).  -AJCC pathologic staging is pT1a N0a.  -See comment and synoptic report.     B.  LEFT LEVEL 6 AND 7 NODE, EXCISION:  -Parathyroid gland with normal cellularity.  -No lymph node identified.  -Benign fibroadipose tissue.    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    3. Multiple myeloma. Diagnosed in 4/2021.  He was initiated on treatment with daratumumab, Velcade, Revlimid and dexamethasone.  S/p autologous PBSCT.   4. High risk for diabetes.    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    PAST MEDICAL HISTORY  Past Medical History:   Diagnosis Date    CAD (coronary artery disease)     s/p stent to CFX    Heart attack (H)     Hyperlipidemia LDL goal <100     Hypertension     Stented coronary artery     Thyroid disease        MEDICATIONS  Current Outpatient Medications   Medication Sig Dispense Refill    acetaminophen (TYLENOL) 500 MG tablet  Take 500-1,000 mg by mouth every 6 hours as needed for mild pain      acyclovir (ZOVIRAX) 400 MG tablet Take 1 tablet (400 mg) by mouth 2 times daily 60 tablet 3    aspirin (ASA) 81 MG EC tablet Take 1 tablet (81 mg) by mouth daily      calcium carbonate-vitamin D (OSCAL) 500-5 MG-MCG tablet TAKE ONE TABLET BY MOUTH ONCE DAILY 90 tablet 3    LENalidomide (REVLIMID) 10 MG CAPS capsule Take 1 capsule (10 mg) by mouth daily 28 capsule 0    LENalidomide (REVLIMID) 10 MG CAPS capsule Take 1 capsule (10 mg) by mouth daily 28 capsule 0    LENalidomide (REVLIMID) 10 MG CAPS capsule Take 1 capsule (10 mg) by mouth daily 28 capsule 0    levothyroxine (SYNTHROID/LEVOTHROID) 125 MCG tablet Take 1 tablet (125 mcg) by mouth daily 90 tablet 2    Lidocaine (LIDOCARE) 4 % Patch Place 2 patches onto the skin every 24 hours To prevent lidocaine toxicity, patient should be patch free for 12 hrs daily. (Patient not taking: Reported on 8/8/2023) 30 patch 0    potassium chloride ER (KLOR-CON M) 10 MEQ CR tablet Take 2 tablets (20 mEq) by mouth daily 60 tablet 3    rosuvastatin (CRESTOR) 40 MG tablet Take 1 tablet (40 mg) by mouth daily 90 tablet 3    vitamin B complex with vitamin C (STRESS TAB) tablet Take 1 tablet by mouth daily 90 tablet 3       Allergies, family, and social history were reviewed and documented as needed in EHR.     REVIEW OF SYSTEMS  A focused ROS was performed, with pertinent positives and negatives as noted in the HPI.    PHYSICAL EXAM  There were no vitals taken for this visit.  There is no height or weight on file to calculate BMI.  Constitutional: Patient is alert, oriented and appears in no acute distress.  Eyes: Eyes grossly normal to inspection, EOMI, no stare, lid lag, or retraction; no conjunctival injection.  ENMT: Lips are without lesions.   Neck: No visible goiter or neck mass.  Respiratory: No audible wheeze or cough. No visible cyanosis. No visible increased work of breathing.  Neurological: Alert and  oriented times 3.  Cranial nerves grossly intact.        DATA REVIEW  Each of the following laboratory and/or imaging studies were reviewed.      Component      Latest Ref [facility] 6/26/2023  2:42 PM 7/7/2023  9:31 AM   Free Testosterone Calculated      ng/dL 5.40  6.03    Testosterone Total      240 - 950 ng/dL 231 (L)  226 (L)    Luteinizing Hormone      1.7 - 8.6 mIU/mL 9.9 (H)  10.0 (H)    FSH      1.5 - 12.4 mIU/mL 13.6 (H)  13.3 (H)    Sex Hormone Binding Globulin      11 - 80 nmol/L 23  17       Legend:  (L) Low  (H) High      Component      Latest Ref [facility] 7/28/2023  12:13 PM   WBC      4.0 - 11.0 10e3/uL 5.3    RBC Count      4.40 - 5.90 10e6/uL 3.69 (L)    Hemoglobin      13.3 - 17.7 g/dL 10.8 (L)    Hematocrit      40.0 - 53.0 % 33.7 (L)    MCV      78 - 100 fL 91    MCH      26.5 - 33.0 pg 29.3    MCHC      31.5 - 36.5 g/dL 32.0    RDW      10.0 - 15.0 % 15.9 (H)    Platelet Count      150 - 450 10e3/uL 322    % Neutrophils      % 37    % Lymphocytes      % 36    % Monocytes      % 11    % Eosinophils      % 16    % Basophils      % 0    % Immature Granulocytes      % 0    NRBCs per 100 WBC      <1 /100 0    Absolute Neutrophils      1.6 - 8.3 10e3/uL 2.0    Absolute Lymphocytes      0.8 - 5.3 10e3/uL 1.9    Absolute Monocytes      0.0 - 1.3 10e3/uL 0.6    Absolute Eosinophils      0.0 - 0.7 10e3/uL 0.8 (H)    Absolute Basophils      0.0 - 0.2 10e3/uL 0.0    Absolute Immature Granulocytes      <=0.4 10e3/uL 0.0    Absolute NRBCs      10e3/uL 0.0       Legend:  (L) Low  (H) High        ASSESSMENT  1.  Elevated gonadotropins.  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  Would recommend testicular ultrasound to confirm that there are no other testicular abnormalities.  Has nonspecific symptoms which could be related to gonadal status, although there is uncertainty surrounding the benefit of  testosterone therapy in this scenario.  We have the option to follow carefully, but given history of chemotherapy and potential for progression of hypogonadism it would be reasonable to trial testosterone therapy to see if it helps improve some of his symptoms.  His oncologist is in agreement if we decide to move forward with testosterone therapy.  We reviewed benefits and potential side effects of testosterone therapy as well as precautions to take while treated with this medication; risks discussed include potential erythrocytosis, potential growth of an existing prostate malignancy and unclear data surrounding risk for cardiovascular disease with testosterone therapy.  We reviewed testosterone formulations and special considerations with the commonly available formulations. Logan would like to give more thought to option of treatment and will update me with his decision.  I will send him reading materials so that he can give more consideration to this option.    2.  Papillary thyroid microcarcinoma.  Status post total thyroidectomy and left selective neck dissection.  T1a NX MX.  Surgical pathology indicates low risk of recurrence/persistent disease based on KRISTEN risk stratification system, therefore radioiodine therapy has been deferred to date.  PET CT of the neck on 12/5/2022 showed FDG uptake in level 2 cervical lymph nodes: possible that these may have been reactive since detailed neck ultrasound performed on 12/29/2022 showed no suspicious appearing lymph nodes.  Additionally, thyroglobulin panel in 6/2022 showed negative thyroglobulin antibodies with thyroglobulin level of less than 0.1 ng/mL; thyroglobulin panel in 6/2023 showed negative thyroglobulin antibodies and thyroglobulin level less than 0.1 ng/mL.  Chest CT performed in 4/2023 (for abnormal PFT) does not show suspicious findings: Does have multiple lytic spinal and sternal lesions related to multiple myeloma.  Since there is not evidence of  recurrent/persistent disease both structurally and biochemically, would follow carefully.  Anticipate detailed neck ultrasound in 12/20/2023 or 1/20/2024.    3.  Hypothyroidism.  Diagnosed in 1/2021, preceding thyroidectomy and likely due to Hashimoto's thyroiditis.  Given response to treatment (with low thyroglobulin level and no suspicious nodes on ultrasound) and given low KRISTEN risk status, our TSH goal is between lower limit of normal and 2.  TSH in 6/2023 was in ideal range.  Continue current levothyroxine dose without changes.    4.  Multiple myeloma.  Following in oncology.  Status post chemotherapy and autologous PBSCT. On maintenance therapy and monthly Zometa.    5.  High risk for diabetes.  A1c 6/2023 range.    PLAN  -Labs on 9/1/2023  -Schedule testicular ultrasound  -Continue levothyroxine without changes  -We will mail reading materials on testosterone therapy  -Give some thought to the options we discussed and update me with preferences  -As planned before, schedule neck ultrasound and labs end of December 2023/early January 2024--neck ultrasound to be completed at the [facility] in [place]   -Follow-up as planned in July 2024, with labs before visit (we can coordinate closer follow-up if needed)  -We will communicate results via [employer], or if needed by phone      Orders Placed This Encounter   Procedures    US Testicular & Scrotum w Doppler Ltd    PSA, screen     Video start time: 7:33 AM  Video end time: 7:52 AM    Physician location: On site    Video platform: [employer]    I spent a total of 49 minutes on the date of encounter reviewing medical records, evaluating the patient, coordinating care and documenting in the EHR, as detailed above.        Makeda Elder MD   Division of Diabetes, Endocrinology and Metabolism  Department of Medicine

## 2023-08-26 DIAGNOSIS — C90.00 MULTIPLE MYELOMA NOT HAVING ACHIEVED REMISSION (H): ICD-10-CM

## 2023-08-28 RX ORDER — POTASSIUM CHLORIDE 750 MG/1
20 TABLET, EXTENDED RELEASE ORAL DAILY
Qty: 60 TABLET | Refills: 3 | Status: SHIPPED | OUTPATIENT
Start: 2023-08-28 | End: 2023-12-21

## 2023-08-28 NOTE — TELEPHONE ENCOUNTER
Medication: Potassium Chloride 10 MEQ tablet    Last prescribing provider: Laura Ballesteros    Last clinic visit date: 08/08/23 w/    Any missed appointments or no-shows since last clinic visit?: No    Recommendations for requested medication (if none, N/A): N/A    Next clinic visit date: 09/01/23 w/Ana Cristina Cleveland    Any other pertinent information (if none, N/A): N/A    Pended and Routed to Provider.

## 2023-09-08 ENCOUNTER — TELEPHONE (OUTPATIENT)
Dept: ONCOLOGY | Facility: CLINIC | Age: 53
End: 2023-09-08
Payer: COMMERCIAL

## 2023-09-08 DIAGNOSIS — C90.00 MULTIPLE MYELOMA NOT HAVING ACHIEVED REMISSION (H): Primary | ICD-10-CM

## 2023-09-08 RX ORDER — LENALIDOMIDE 10 MG/1
10 CAPSULE ORAL DAILY
Qty: 28 CAPSULE | Refills: 0 | Status: SHIPPED | OUTPATIENT
Start: 2023-09-08 | End: 2023-10-06

## 2023-09-08 NOTE — TELEPHONE ENCOUNTER
Oral Chemotherapy Monitoring Program    Medication: Revlimid  Rx:  10 mg PO daily on days 1 - 28 of 28 day cycle #28    Auth #: [id number]  Risk Category: Adult male    Routine survey questions reviewed.    Thank you,    Idalmis Krishna  Oncology/Transplant Float [email or web address]  Phone:[phone]  Fax:[phone]

## 2023-09-11 ENCOUNTER — INFUSION THERAPY VISIT (OUTPATIENT)
Dept: ONCOLOGY | Facility: CLINIC | Age: 53
End: 2023-09-11
Attending: STUDENT IN AN ORGANIZED HEALTH CARE EDUCATION/TRAINING PROGRAM
Payer: COMMERCIAL

## 2023-09-11 ENCOUNTER — LAB (OUTPATIENT)
Dept: LAB | Facility: CLINIC | Age: 53
End: 2023-09-11
Attending: STUDENT IN AN ORGANIZED HEALTH CARE EDUCATION/TRAINING PROGRAM
Payer: COMMERCIAL

## 2023-09-11 VITALS
RESPIRATION RATE: 16 BRPM | TEMPERATURE: 98.1 F | DIASTOLIC BLOOD PRESSURE: 88 MMHG | SYSTOLIC BLOOD PRESSURE: 150 MMHG | HEART RATE: 75 BPM | OXYGEN SATURATION: 98 %

## 2023-09-11 DIAGNOSIS — C90.00 MULTIPLE MYELOMA NOT HAVING ACHIEVED REMISSION (H): Primary | ICD-10-CM

## 2023-09-11 DIAGNOSIS — R79.89 LOW TESTOSTERONE IN MALE: ICD-10-CM

## 2023-09-11 LAB — PSA SERPL DL<=0.01 NG/ML-MCNC: 0.95 NG/ML (ref 0–3.5)

## 2023-09-11 PROCEDURE — 82784 ASSAY IGA/IGD/IGG/IGM EACH: CPT

## 2023-09-11 PROCEDURE — 250N000011 HC RX IP 250 OP 636: Performed by: STUDENT IN AN ORGANIZED HEALTH CARE EDUCATION/TRAINING PROGRAM

## 2023-09-11 PROCEDURE — 258N000003 HC RX IP 258 OP 636: Performed by: STUDENT IN AN ORGANIZED HEALTH CARE EDUCATION/TRAINING PROGRAM

## 2023-09-11 PROCEDURE — G0103 PSA SCREENING: HCPCS | Performed by: PATHOLOGY

## 2023-09-11 PROCEDURE — 96365 THER/PROPH/DIAG IV INF INIT: CPT

## 2023-09-11 PROCEDURE — 96401 CHEMO ANTI-NEOPL SQ/IM: CPT

## 2023-09-11 PROCEDURE — 83521 IG LIGHT CHAINS FREE EACH: CPT

## 2023-09-11 RX ADMIN — ZOLEDRONIC ACID 3 MG: 4 INJECTION, SOLUTION, CONCENTRATE INTRAVENOUS at 14:47

## 2023-09-11 RX ADMIN — DARATUMUMAB AND HYALURONIDASE-FIHJ (HUMAN RECOMBINANT) 1800 MG: 1800; 30000 INJECTION SUBCUTANEOUS at 14:36

## 2023-09-11 ASSESSMENT — PAIN SCALES - GENERAL: PAINLEVEL: MILD PAIN (2)

## 2023-09-11 NOTE — PATIENT INSTRUCTIONS
[facility] Triage and after hours / weekends / holidays:  [phone]    Please call the triage or after hours line if you experience a temperature greater than or equal to 100.5, shaking chills, have uncontrolled nausea, vomiting and/or diarrhea, dizziness, shortness of breath, chest pain, bleeding, unexplained bruising, or if you have any other new/concerning symptoms, questions or concerns.      If you are having any concerning symptoms or wish to speak to a provider before your next infusion visit, please call your care coordinator or triage to notify them so we can adequately serve you.     If you need a refill on a narcotic prescription or other medication, please call before your infusion appointment.

## 2023-09-11 NOTE — PROGRESS NOTES
Infusion Nursing Note:  June Ronquillo presents today for cycle 22 day 1 darzalex faspro, zometa.    Patient seen by provider today: No   present during visit today: Not Applicable.    Note:   Patient is feeling well today. He reports mild discomfort in his feet, he declined interventions. He denies fevers, chills, SOB, chest pain, nausea, and diarrhea.    Patient confirms he is taking calcium/vitamin D supplement.    Creatinine 1.31, Georgina Molina CNP notified.    TORB: Georgina Cabezas CNP/Isabell Hutchinson RN at 1425 on 9/11/23: no intervention needed for creatinine today.    Intravenous Access:  Peripheral IV placed.    Treatment Conditions:  Lab Results   Component Value Date    HGB 12.4 (L) 09/11/2023    WBC 5.7 09/11/2023    ANEU 4.7 03/13/2023    ANEUTAUTO 2.7 09/11/2023     09/11/2023        Lab Results   Component Value Date     09/11/2023    POTASSIUM 3.6 09/11/2023    MAG 2.6 (H) 04/28/2023    CR 1.31 (H) 09/11/2023    HARDEEP 9.1 09/11/2023    BILITOTAL 0.4 09/11/2023    ALBUMIN 4.6 09/11/2023    ALT 14 09/11/2023    AST 26 09/11/2023       Results reviewed, labs MET treatment parameters, ok to proceed with treatment.      Post Infusion Assessment:  Patient tolerated infusion without incident.  Patient tolerated darzalex faspro injection into left lower abdomen without incident.  Blood return noted pre and post infusion.  Site patent and intact, free from redness, edema or discomfort.  No evidence of extravasations.  Access discontinued per protocol.       Discharge Plan:   Patient declined prescription refills.  Discharge instructions reviewed with: Patient.  Patient and/or family verbalized understanding of discharge instructions and all questions answered.  AVS to patient via [employer].  Patient will return 9/28 for next appointment.   Patient discharged in stable condition accompanied by: self.  Departure Mode: Ambulatory.      Isabell Hutchinson RN

## 2023-09-13 DIAGNOSIS — C90.00 MULTIPLE MYELOMA NOT HAVING ACHIEVED REMISSION (H): Primary | ICD-10-CM

## 2023-09-18 ENCOUNTER — MYC MEDICAL ADVICE (OUTPATIENT)
Dept: ENDOCRINOLOGY | Facility: CLINIC | Age: 53
End: 2023-09-18
Payer: COMMERCIAL

## 2023-09-18 DIAGNOSIS — R79.89 LOW TESTOSTERONE IN MALE: Primary | ICD-10-CM

## 2023-09-19 ENCOUNTER — HOSPITAL ENCOUNTER (OUTPATIENT)
Dept: ULTRASOUND IMAGING | Facility: HOSPITAL | Age: 53
Discharge: HOME | End: 2023-09-19
Attending: INTERNAL MEDICINE | Admitting: INTERNAL MEDICINE
Payer: COMMERCIAL

## 2023-09-19 DIAGNOSIS — R53.83 FATIGUE, UNSPECIFIED TYPE: ICD-10-CM

## 2023-09-19 DIAGNOSIS — R79.89 LOW TESTOSTERONE IN MALE: ICD-10-CM

## 2023-09-19 PROCEDURE — 93976 VASCULAR STUDY: CPT

## 2023-09-22 ENCOUNTER — TELEPHONE (OUTPATIENT)
Dept: ENDOCRINOLOGY | Facility: CLINIC | Age: 53
End: 2023-09-22

## 2023-09-22 RX ORDER — TESTOSTERONE CYPIONATE 200 MG/ML
150 INJECTION, SOLUTION INTRAMUSCULAR
Qty: 2 ML | Refills: 5 | Status: SHIPPED | OUTPATIENT
Start: 2023-09-22 | End: 2024-02-21

## 2023-09-22 RX ORDER — NEEDLES, DISPOSABLE 25GX5/8"
NEEDLE, DISPOSABLE MISCELLANEOUS
Qty: 25 EACH | Refills: 1 | Status: SHIPPED | OUTPATIENT
Start: 2023-09-22

## 2023-09-22 NOTE — TELEPHONE ENCOUNTER
PA needed on Testosterone Cyp 200mg/ml  Pt insurance is [employer]  Insurance phone number: [phone]  Pt ID number: [id number]  Please let us know when PA is granted/denied.    Costa Alvarez  [facility]  [phone]    Thank you.

## 2023-09-26 ENCOUNTER — MYC MEDICAL ADVICE (OUTPATIENT)
Dept: FAMILY MEDICINE | Facility: CLINIC | Age: 53
End: 2023-09-26
Payer: COMMERCIAL

## 2023-09-26 NOTE — TELEPHONE ENCOUNTER
Please see My chart message. Referral has been pended. Thanks    Vesna Ramirez RN  [facility]

## 2023-09-26 NOTE — TELEPHONE ENCOUNTER
patient has not seen anyone here in 2 years   Last saw DR Baldwin at [facility]    Needs to pick a provider then get a referral

## 2023-09-27 NOTE — TELEPHONE ENCOUNTER
Central Prior Authorization Team   Phone: [phone]    PA Initiation    Medication: TESTOSTERONE CYPIONATE 200 MG/ML [facility]  Insurance Company: [employer] - Phone [phone] Fax [phone]  Pharmacy Filling the Rx: [place], [place] - [address]  Filling Pharmacy Phone: [phone]  Filling Pharmacy Fax:    Start Date: 9/27/2023

## 2023-09-28 NOTE — TELEPHONE ENCOUNTER
Prior Authorization Approval    Medication: TESTOSTERONE CYPIONATE 200 MG/ML IJ DYANA  Authorization Effective Date: 9/27/2023  Authorization Expiration Date: 9/26/2026  Approved Dose/Quantity:   Reference #:     Insurance Company: [employer] - Phone [phone] Fax [phone]  Expected CoPay: $    CoPay Card Available:      Financial Assistance Needed:   Which Pharmacy is filling the prescription: [facility] [place], [place] - [address]  Pharmacy Notified:   Patient Notified:

## 2023-10-03 ASSESSMENT — ENCOUNTER SYMPTOMS
SMELL DISTURBANCE: 1
NECK MASS: 0
FEVER: 1
EYE REDNESS: 0
HALLUCINATIONS: 0
MUSCLE WEAKNESS: 1
ALTERED TEMPERATURE REGULATION: 1
VOMITING: 0
CHILLS: 1
EYE WATERING: 1
TASTE DISTURBANCE: 1
STIFFNESS: 1
SORE THROAT: 1
EYE IRRITATION: 1
WHEEZING: 1
INCREASED ENERGY: 1
FATIGUE: 1
EYE PAIN: 0
NECK PAIN: 0
MYALGIAS: 1
CONSTIPATION: 1
BACK PAIN: 0
POLYDIPSIA: 1
POLYPHAGIA: 0
BLOOD IN STOOL: 0
ARTHRALGIAS: 1
MUSCLE CRAMPS: 1
TROUBLE SWALLOWING: 0
SPUTUM PRODUCTION: 1
DYSPNEA ON EXERTION: 1
POSTURAL DYSPNEA: 0
SINUS PAIN: 0
COUGH DISTURBING SLEEP: 1
SHORTNESS OF BREATH: 0
RECTAL PAIN: 0
HEARTBURN: 0
HEMOPTYSIS: 0
HOARSE VOICE: 1
DOUBLE VISION: 0
DIARRHEA: 1
SINUS CONGESTION: 1
ABDOMINAL PAIN: 1
DECREASED APPETITE: 1
NAUSEA: 1
SNORES LOUDLY: 1
BOWEL INCONTINENCE: 0
BLOATING: 0
COUGH: 1
NIGHT SWEATS: 1
JOINT SWELLING: 0
WEIGHT GAIN: 0
WEIGHT LOSS: 0
JAUNDICE: 0

## 2023-10-05 ENCOUNTER — APPOINTMENT (OUTPATIENT)
Dept: ENDOCRINOLOGY | Facility: CLINIC | Age: 53
End: 2023-10-05
Payer: COMMERCIAL

## 2023-10-05 ENCOUNTER — TELEPHONE (OUTPATIENT)
Dept: ENDOCRINOLOGY | Facility: CLINIC | Age: 53
End: 2023-10-05

## 2023-10-05 NOTE — TELEPHONE ENCOUNTER
Nurse teaching given on IM injection technique and the patient expresses understanding and acceptance of instructions. Pt expresses reservations about being able to inject himself. Informed Pt option of gel applicatoin was available. Pt states he will try to give himself the injection or have his wife administer and then notify clinic if he has concerns.   Provider notified.    Malina Rayo RN 10/5/2023 2:51 PM

## 2023-10-06 DIAGNOSIS — C90.00 MULTIPLE MYELOMA NOT HAVING ACHIEVED REMISSION (H): Primary | ICD-10-CM

## 2023-10-06 RX ORDER — LENALIDOMIDE 10 MG/1
10 CAPSULE ORAL DAILY
Qty: 28 CAPSULE | Refills: 0 | Status: SHIPPED | OUTPATIENT
Start: 2023-10-06 | End: 2023-11-15

## 2023-10-10 DIAGNOSIS — C90.00 MULTIPLE MYELOMA NOT HAVING ACHIEVED REMISSION (H): Primary | ICD-10-CM

## 2023-10-12 ENCOUNTER — OFFICE VISIT (OUTPATIENT)
Dept: FAMILY MEDICINE | Facility: CLINIC | Age: 53
End: 2023-10-12
Payer: COMMERCIAL

## 2023-10-12 VITALS
OXYGEN SATURATION: 100 % | SYSTOLIC BLOOD PRESSURE: 152 MMHG | WEIGHT: 147.5 LBS | HEIGHT: 65 IN | BODY MASS INDEX: 24.57 KG/M2 | DIASTOLIC BLOOD PRESSURE: 99 MMHG | RESPIRATION RATE: 17 BRPM | TEMPERATURE: 97.8 F | HEART RATE: 80 BPM

## 2023-10-12 DIAGNOSIS — Z12.11 SCREEN FOR COLON CANCER: Primary | ICD-10-CM

## 2023-10-12 PROCEDURE — 99213 OFFICE O/P EST LOW 20 MIN: CPT

## 2023-10-12 ASSESSMENT — PAIN SCALES - GENERAL: PAINLEVEL: NO PAIN (0)

## 2023-10-12 NOTE — PROGRESS NOTES
"  Assessment & Plan     Screen for colon cancer  - Colonoscopy Screening  Referral; Future      Casey Gunter NP  [facility] is a 53 year [facility], presenting for the following health issues:  Patient is here for order for referral for colonoscopy. He is planning on establishing care with an internal medicine provider.    Establish Care and Colonoscopy      10/12/2023     9:46 AM   Additional Questions   Roomed by loula   Accompanied by self       History of Present Illness       Reason for visit:  Referral for colonoscopy.    He eats 2-3 servings of fruits and vegetables daily.He consumes 0 sweetened beverage(s) daily.He exercises with enough effort to increase his heart rate 9 or less minutes per day.  He exercises with enough effort to increase his heart rate 5 days per week.   He is taking medications regularly.     Review of Systems   Constitutional, HEENT, cardiovascular, pulmonary, gi and gu systems are negative, except as otherwise noted.      Objective    BP (!) 152/99   Pulse 80   Temp 97.8  F (36.6  C) (Temporal)   Resp 17   Ht 1.64 m (5' 4.57\")   Wt 66.9 kg (147 lb 8 oz)   SpO2 100%   BMI 24.88 kg/m    Body mass index is 24.88 kg/m .  Physical Exam   GENERAL: healthy, alert and no distress  PSYCH: mentation appears normal, affect normal/bright              "

## 2023-10-13 ENCOUNTER — OFFICE VISIT (OUTPATIENT)
Dept: URGENT CARE | Facility: URGENT CARE | Age: 53
End: 2023-10-13
Payer: COMMERCIAL

## 2023-10-13 ENCOUNTER — ANCILLARY PROCEDURE (OUTPATIENT)
Dept: GENERAL RADIOLOGY | Facility: CLINIC | Age: 53
End: 2023-10-13
Attending: FAMILY MEDICINE
Payer: COMMERCIAL

## 2023-10-13 ENCOUNTER — TELEPHONE (OUTPATIENT)
Dept: ONCOLOGY | Facility: CLINIC | Age: 53
End: 2023-10-13
Payer: COMMERCIAL

## 2023-10-13 VITALS
DIASTOLIC BLOOD PRESSURE: 82 MMHG | RESPIRATION RATE: 26 BRPM | WEIGHT: 147 LBS | SYSTOLIC BLOOD PRESSURE: 148 MMHG | TEMPERATURE: 98.6 F | OXYGEN SATURATION: 100 % | BODY MASS INDEX: 24.79 KG/M2 | HEART RATE: 77 BPM

## 2023-10-13 DIAGNOSIS — C90.00 MULTIPLE MYELOMA NOT HAVING ACHIEVED REMISSION (H): ICD-10-CM

## 2023-10-13 DIAGNOSIS — R05.9 COUGH, UNSPECIFIED TYPE: ICD-10-CM

## 2023-10-13 DIAGNOSIS — R07.0 THROAT PAIN: Primary | ICD-10-CM

## 2023-10-13 LAB
DEPRECATED S PYO AG THROAT QL EIA: NEGATIVE
GROUP A STREP BY PCR: NOT DETECTED

## 2023-10-13 PROCEDURE — 71046 X-RAY EXAM CHEST 2 VIEWS: CPT | Mod: TC | Performed by: INTERNAL MEDICINE

## 2023-10-13 PROCEDURE — 87651 STREP A DNA AMP PROBE: CPT | Performed by: FAMILY MEDICINE

## 2023-10-13 PROCEDURE — 99214 OFFICE O/P EST MOD 30 MIN: CPT | Performed by: FAMILY MEDICINE

## 2023-10-13 RX ORDER — BENZONATATE 200 MG/1
200 CAPSULE ORAL 3 TIMES DAILY PRN
Qty: 21 CAPSULE | Refills: 0 | Status: SHIPPED | OUTPATIENT
Start: 2023-10-13 | End: 2023-10-20

## 2023-10-13 RX ORDER — DOXYCYCLINE 100 MG/1
100 TABLET ORAL 2 TIMES DAILY
Qty: 14 TABLET | Refills: 0 | Status: SHIPPED | OUTPATIENT
Start: 2023-10-13 | End: 2023-10-20

## 2023-10-13 NOTE — TELEPHONE ENCOUNTER
Oral Chemotherapy Monitoring Program   Medication: Revlimid  Rx: 10mg PO daily on days 1 through 28 of 28 day cycle   Auth #: [id number]   Risk Category: Adult Male  Routine survey questions reviewed.   Rx to be Escribed to [facility]    Yu Goldberg  [facility]  Oncology Pharmacy Liaison   [email or web address]  [phone] (phone)  [phone] (fax)

## 2023-10-13 NOTE — PROGRESS NOTES
"SUBJECTIVE: June Ronquillo is a 53 year old male presenting with a chief complaint of \"cold symptoms\" and cough .  Onset of symptoms was 1 month(s) ago.  Current and Associated symptoms: cough - productive  Treatment measures tried include Tylenol/Ibuprofen.  Predisposing factors include ill contact: Family member .    Past Medical History:   Diagnosis Date    CAD (coronary artery disease)     s/p stent to CFX    Heart attack (H)     Hyperlipidemia LDL goal <100     Hypertension     Stented coronary artery     Thyroid disease      Allergies   Allergen Reactions    Blood Transfusion Related (Informational Only) Other (See Comments)     Patient has a history of a clinically significant antibody against RBC antigens.  A delay in compatible RBCs may occur.      Social History     Tobacco Use    Smoking status: Never    Smokeless tobacco: Never   Substance Use Topics    Alcohol use: Not Currently       ROS:  SKIN: no rash  GI: no vomiting    OBJECTIVE:  BP (!) 148/82   Pulse 77   Temp 98.6  F (37  C) (Tympanic)   Resp 26   Wt 66.7 kg (147 lb)   SpO2 100%   BMI 24.79 kg/m  GENERAL APPEARANCE: healthy, alert and no distress  EYES: EOMI,  PERRL, conjunctiva clear  HENT: ear canals and TM's normal.  Nose and mouth without ulcers, erythema or lesions  RESP: lungs clear to auscultation - no rales, rhonchi or wheezes  SKIN: no suspicious lesions or rashes    Xray without acute findings, no pneumonia read by Kyle Reynolds D.O.      ICD-10-CM    1. Throat pain  R07.0 Streptococcus A Rapid Screen w/Reflex to PCR     Group A Streptococcus PCR Throat Swab      2. Cough, unspecified type  R05.9 XR Chest 2 Views     doxycycline monohydrate (ADOXA) 100 MG tablet     benzonatate (TESSALON) 200 MG capsule      3. Multiple myeloma not having achieved remission (H)  C90.00           Fluids/Rest, f/u if worse/not any better    "

## 2023-10-18 NOTE — PROGRESS NOTES
[facility]  Date of visit: 10/19/2023        Oncologic Hx:   - 4/30/2021 M spike of 34.8 in urine.M spike in blood 0.2; IgG 702 with depressed IgA and IgM. Beta 2 microglobulin 2.7. Lambda  with K/L ratio of 0.01. WBC 11.1 with absolute eos of 1.0. hgb, plt, Cr (1.1), and albumin WNL.    -4/30/2021 CT abd/pelvis showed sclerotic marrow changes with numerous lytic appearing lesions throughout the imaged portions of the spine, pelvis and ribs.      -PET scan 5/11/2021 Numerous osteolytic lesions which predominantly demonstrate uptake below liver background levels. There is one intraosseous lesion in the left lateral 9th rib that demonstrates uptake above background, possibly due to nondisplaced fracture. Adjacent to this lesion is mild  hypermetabolism to the left pleura, with new small left pleural effusion, suspicious for malignant effusion versus posttraumatic effusion. Pleural uptake may represent extramedullary myeloma extending along the intercostal space versus inflammation.    - BM bx 5/17/2021 with flow showing 4.0% plasma cells which express CD19 (dim), CD38 (bright), CD45 (dim), , and monotypic cytoplasmic lambda immunoglobulin light chains but lack CD20 and CD56.  Hypercellular bone marrow for age (approximately 75% cellularity) with adequate trilineage hematopoiesis. Plasma cell infiltrate: Interstitial, lambda monotypic and representing approximately 60% the bone marrow cellularity, by  immunohistochemical stain. Cytpgenetics with 2 related hypodiploid clones. One with loss of Y, monosomy 13 and 14 (5%) and one with translocation of 8p and 14, derivative 16 with long arm replaced by extra copy 1q,monosomy 21. FISH showed gain of 1q, loss of 13 and 14, IGH-MYC fusion t(8:14)    - Started Miracle-RVd 21 day with velcade on days 1,8,15.     -Cycle 2 Miracle-RVd. Dose reduce dex to 80 mg d/t fatigue and stomach upset on dex days. Also having cough with basilar  streaking on CXR    - 8/31/2021 BM bx -rare suspicious plasma cells in touch imprint. No increase in plasma cells by morph.    -8/31/2021 PET/CT Diffuse osteolytic lesions throughout the axial and appendicular skeleton. Increased sclerosis since prior exam 5/11/2021 without increased metabolism or size.  Hypermetabolic pretracheal lymph node as well as hypermetabolic level 2 lymph nodes within the right neck. These lymph nodes are likely reactive from autoimmune activation via medical therapy. Hypermetabolic subcentimeter nodules within the thyroid gland    - 11/11/2021 Autologous BMT    - 2/21/22 started maintenance with Revlimid 10 mg daily and daratumumab monthly; did Revlimid alone for C1 due to low IgG levels, added daratumumab starting C2    Interval Hx:   June presents to clinic for toxicity check prior to C23D1.  He is overall doing well.  He has had an ongoing cough for the past month with a runny nose.  He is on day 5 of antibiotics and reports no improvement.  His energy levels have been a little lower due to respiratory infection.    Reports he has been having intermittent loose stools, 1 per day.      He is eating and drinking well.  Denies fevers/chills, SOB, chest pain, bleeding issues, neuropathy, N/V/, constipation, rashes/sores, night sweats.      Physical Exam:  BP (!) 143/92   Pulse 74   Temp 98.3  F (36.8  C)   Resp 16   Wt 67.5 kg (148 lb 14.4 oz)   SpO2 100%   BMI 25.11 kg/m      General: No acute distress  HEENT: Sclera anicteric. Oral exam deferred  Lymph: No lymphadenopathy in neck  Heart: Regular, rate, and rhythm  Lungs: Wheezes upper lobes to ascultation bilaterally  Extremities: no lower extremity edema  Neuro: Cranial nerves grossly intact  Rash: none on exposed skin       Most Recent 3 CBC's:  Recent Labs   Lab Test 10/19/23  1401 09/11/23  1332 07/28/23  1213   WBC 4.9 5.7 5.3   HGB 13.4 12.4* 10.8*   MCV 92 90 91    333 322   ANEUTAUTO 2.1 2.7 2.0     Most Recent  3 BMP's:  Recent Labs   Lab Test 10/19/23  1401 09/11/23  1332 08/08/23  1527    137 139   POTASSIUM 4.5 3.6 4.1   CHLORIDE 103 103 108*   CO2 24 23 21*   BUN 10.6 14.8 7.1   CR 1.24* 1.31* 1.15   ANIONGAP 10 11 10   HARDEEP 9.1 9.1 9.0   GLC 95 137* 103*   PROTTOTAL 6.9 6.5 6.3*   ALBUMIN 4.4 4.6 4.4    Most Recent 3 LFT's:  Recent Labs   Lab Test 10/19/23  1401 09/11/23  1332 08/08/23  1527   AST 34 26 36   ALT 19 14 21   ALKPHOS 66 51 61   BILITOTAL 0.3 0.4 0.4    Most Recent 2 TSH and T4:  Recent Labs   Lab Test 06/26/23  1442 12/19/22  1103 06/17/22  1332 11/14/21  1103 10/20/21  1145   TSH 0.58 0.93 0.07*   < > 0.70   T4  --   --  1.36  --  0.97    < > = values in this interval not displayed.      Latest Reference Range & Units 09/11/23 13:32   Albumin Fraction 3.7 - 5.1 g/dL 4.2   Alpha 1 Fraction 0.2 - 0.4 g/dL 0.3   Alpha 2 Fraction 0.5 - 0.9 g/dL 0.5   Beta Fraction 0.6 - 1.0 g/dL 0.7   ELP Interpretation:  Small monoclonal protein (0.1 g/dL) seen in the gamma fraction. See immunofixation report on same specimen. Hypogammaglobulinemia. Pathologic significance requires clinical correlation. Rizwan Ace M.D., Ph.D., Pathologist.   Gamma Fraction 0.7 - 1.6 g/dL 0.5 (L)   IGA 84 - 499 mg/dL 77 (L)    - 1,616 mg/dL 548 (L)   IGM 35 - 242 mg/dL 26 (L)   Immunofixation ELP  Faint monoclonal IgG immunoglobulin of kappa light chain type. Monoclonal antibody therapeutics (e.g. Daratumumab) may appear as monoclonal proteins on serum electrophoresis and immunofixation. Results should be interpreted with caution. Pathologic significance requires clinical correlation. Rizwan Ace M.D., Ph.D., Pathologist   Kappa Free Lt Chain 0.33 - 1.94 mg/dL 1.16   Kappa Lambda Ratio 0.26 - 1.65  1.07   Lambda Free Lt Chain 0.57 - 2.63 mg/dL 1.08   Monoclonal Peak <=0.0 g/dL 0.1 (H)   Total Protein Serum for ELP 6.4 - 8.3 g/dL 6.2 (L)   (L): Data is abnormally low  (H): Data is abnormally high    I reviewed  the above labs today.      Assessment and Plan  Logan has multiple myeloma with high risk cytogenetics. He got Vidya-RVD with VGPR, then underwent autologous transplant 11/11/21. Continue acyclovir daily for viral ppx and bactrim M/T for PJP ppx. Given his high risk cytogenetics he started vidya + revlimid maintenance therapy 2/21/22.. He had severe flu-like effects from his first dose of Zometa but he has been tolerating monthly Zometa dosed at 3 mg. Continue Vit D.   - Continue vidya + revlimid today, labs stable. Continue zometa  - Get clonoseq on upcoming BMBx and consider de-escalating from dual maintenance if negative  - Revlimid held for 1 week (10/20-10/27) due to parainfluenza and coronavirus    Cough:  Rhinorrhea:  Patient reports ongoing cough over the past months and being on antibiotics x5 days with no improvement.  He had a chest xray 10/13 that was unremarkable.  He denies fevers or SOB, low concern for pneumonia.  - Covid swab and RVP today (10/19)  Addendum: RVP positive for coronavirus and parainfluenza.  Script sent for tessalon pearls and albuterol inhaler.  Will have patient hold his Revlimid 1 week.      Elevated serum creatinine  Creatinine is trending downward.      Hypogammaglobulinemia Start monthly IV Ig whenever IgG <300  - 9/11 IgG 548  - 10/19 level pending     Normocytic Anemia: likely d/t revlimid and daratumumab. Will keep monitoring and dose reduce if needed.      Elevated BP:  We discussed his elevated BP in clinic and recommended following with his PCP.  Patient reports he is currently taking cold medications which could be contributing to his BP elevation.      40 minutes spent on the date of the encounter doing chart review, review of test results, interpretation of tests, patient visit, and documentation     PATTY Jamil CNP

## 2023-10-19 ENCOUNTER — APPOINTMENT (OUTPATIENT)
Dept: LAB | Facility: CLINIC | Age: 53
End: 2023-10-19
Attending: STUDENT IN AN ORGANIZED HEALTH CARE EDUCATION/TRAINING PROGRAM
Payer: COMMERCIAL

## 2023-10-19 ENCOUNTER — ONCOLOGY VISIT (OUTPATIENT)
Dept: ONCOLOGY | Facility: CLINIC | Age: 53
End: 2023-10-19
Attending: STUDENT IN AN ORGANIZED HEALTH CARE EDUCATION/TRAINING PROGRAM
Payer: COMMERCIAL

## 2023-10-19 ENCOUNTER — MYC MEDICAL ADVICE (OUTPATIENT)
Dept: CARDIOLOGY | Facility: CLINIC | Age: 53
End: 2023-10-19

## 2023-10-19 VITALS
OXYGEN SATURATION: 100 % | DIASTOLIC BLOOD PRESSURE: 92 MMHG | WEIGHT: 148.9 LBS | RESPIRATION RATE: 16 BRPM | TEMPERATURE: 98.3 F | BODY MASS INDEX: 25.11 KG/M2 | SYSTOLIC BLOOD PRESSURE: 143 MMHG | HEART RATE: 74 BPM

## 2023-10-19 DIAGNOSIS — R03.0 ELEVATED BLOOD PRESSURE READING WITHOUT DIAGNOSIS OF HYPERTENSION: ICD-10-CM

## 2023-10-19 DIAGNOSIS — R05.9 COUGH, UNSPECIFIED TYPE: ICD-10-CM

## 2023-10-19 DIAGNOSIS — C90.00 MULTIPLE MYELOMA NOT HAVING ACHIEVED REMISSION (H): ICD-10-CM

## 2023-10-19 DIAGNOSIS — C90.00 MULTIPLE MYELOMA NOT HAVING ACHIEVED REMISSION (H): Primary | ICD-10-CM

## 2023-10-19 DIAGNOSIS — J34.89 RHINORRHEA: ICD-10-CM

## 2023-10-19 DIAGNOSIS — C90.01 MULTIPLE MYELOMA IN REMISSION (H): Primary | ICD-10-CM

## 2023-10-19 LAB
ALBUMIN SERPL BCG-MCNC: 4.4 G/DL (ref 3.5–5.2)
ALP SERPL-CCNC: 66 U/L (ref 40–129)
ALT SERPL W P-5'-P-CCNC: 19 U/L (ref 0–70)
ANION GAP SERPL CALCULATED.3IONS-SCNC: 10 MMOL/L (ref 7–15)
AST SERPL W P-5'-P-CCNC: 34 U/L (ref 0–45)
BASO+EOS+MONOS # BLD AUTO: ABNORMAL 10*3/UL
BASO+EOS+MONOS NFR BLD AUTO: ABNORMAL %
BASOPHILS # BLD AUTO: 0.1 10E3/UL (ref 0–0.2)
BASOPHILS NFR BLD AUTO: 1 %
BILIRUB SERPL-MCNC: 0.3 MG/DL
BUN SERPL-MCNC: 10.6 MG/DL (ref 6–20)
C PNEUM DNA SPEC QL NAA+PROBE: NOT DETECTED
CALCIUM SERPL-MCNC: 9.1 MG/DL (ref 8.6–10)
CHLORIDE SERPL-SCNC: 103 MMOL/L (ref 98–107)
CREAT SERPL-MCNC: 1.24 MG/DL (ref 0.67–1.17)
DEPRECATED HCO3 PLAS-SCNC: 24 MMOL/L (ref 22–29)
EGFRCR SERPLBLD CKD-EPI 2021: 69 ML/MIN/1.73M2
EOSINOPHIL # BLD AUTO: 0.4 10E3/UL (ref 0–0.7)
EOSINOPHIL NFR BLD AUTO: 7 %
ERYTHROCYTE [DISTWIDTH] IN BLOOD BY AUTOMATED COUNT: 15.6 % (ref 10–15)
FLUAV H1 2009 PAND RNA SPEC QL NAA+PROBE: NOT DETECTED
FLUAV H1 RNA SPEC QL NAA+PROBE: NOT DETECTED
FLUAV H3 RNA SPEC QL NAA+PROBE: NOT DETECTED
FLUAV RNA SPEC QL NAA+PROBE: NOT DETECTED
FLUBV RNA SPEC QL NAA+PROBE: NOT DETECTED
GLUCOSE SERPL-MCNC: 95 MG/DL (ref 70–99)
HADV DNA SPEC QL NAA+PROBE: NOT DETECTED
HCOV PNL SPEC NAA+PROBE: DETECTED
HCT VFR BLD AUTO: 40.6 % (ref 40–53)
HGB BLD-MCNC: 13.4 G/DL (ref 13.3–17.7)
HMPV RNA SPEC QL NAA+PROBE: NOT DETECTED
HPIV1 RNA SPEC QL NAA+PROBE: NOT DETECTED
HPIV2 RNA SPEC QL NAA+PROBE: NOT DETECTED
HPIV3 RNA SPEC QL NAA+PROBE: NOT DETECTED
HPIV4 RNA SPEC QL NAA+PROBE: DETECTED
IMM GRANULOCYTES # BLD: 0 10E3/UL
IMM GRANULOCYTES NFR BLD: 0 %
LYMPHOCYTES # BLD AUTO: 1.7 10E3/UL (ref 0.8–5.3)
LYMPHOCYTES NFR BLD AUTO: 33 %
M PNEUMO DNA SPEC QL NAA+PROBE: NOT DETECTED
MCH RBC QN AUTO: 30.3 PG (ref 26.5–33)
MCHC RBC AUTO-ENTMCNC: 33 G/DL (ref 31.5–36.5)
MCV RBC AUTO: 92 FL (ref 78–100)
MONOCYTES # BLD AUTO: 0.7 10E3/UL (ref 0–1.3)
MONOCYTES NFR BLD AUTO: 15 %
NEUTROPHILS # BLD AUTO: 2.1 10E3/UL (ref 1.6–8.3)
NEUTROPHILS NFR BLD AUTO: 44 %
NRBC # BLD AUTO: 0 10E3/UL
NRBC BLD AUTO-RTO: 0 /100
PLATELET # BLD AUTO: 395 10E3/UL (ref 150–450)
POTASSIUM SERPL-SCNC: 4.5 MMOL/L (ref 3.4–5.3)
PROT SERPL-MCNC: 6.9 G/DL (ref 6.4–8.3)
RBC # BLD AUTO: 4.42 10E6/UL (ref 4.4–5.9)
RSV RNA SPEC QL NAA+PROBE: NOT DETECTED
RSV RNA SPEC QL NAA+PROBE: NOT DETECTED
RV+EV RNA SPEC QL NAA+PROBE: NOT DETECTED
SODIUM SERPL-SCNC: 137 MMOL/L (ref 135–145)
TOTAL PROTEIN SERUM FOR ELP: 6.7 G/DL (ref 6.4–8.3)
WBC # BLD AUTO: 4.9 10E3/UL (ref 4–11)

## 2023-10-19 PROCEDURE — 96401 CHEMO ANTI-NEOPL SQ/IM: CPT

## 2023-10-19 PROCEDURE — 82784 ASSAY IGA/IGD/IGG/IGM EACH: CPT

## 2023-10-19 PROCEDURE — G0463 HOSPITAL OUTPT CLINIC VISIT: HCPCS

## 2023-10-19 PROCEDURE — 84165 PROTEIN E-PHORESIS SERUM: CPT | Mod: TC | Performed by: PATHOLOGY

## 2023-10-19 PROCEDURE — 87581 M.PNEUMON DNA AMP PROBE: CPT

## 2023-10-19 PROCEDURE — 250N000011 HC RX IP 250 OP 636: Mod: JZ

## 2023-10-19 PROCEDURE — 84165 PROTEIN E-PHORESIS SERUM: CPT | Mod: 26 | Performed by: PATHOLOGY

## 2023-10-19 PROCEDURE — 87635 SARS-COV-2 COVID-19 AMP PRB: CPT

## 2023-10-19 PROCEDURE — 85004 AUTOMATED DIFF WBC COUNT: CPT

## 2023-10-19 PROCEDURE — 80053 COMPREHEN METABOLIC PANEL: CPT

## 2023-10-19 PROCEDURE — 96374 THER/PROPH/DIAG INJ IV PUSH: CPT

## 2023-10-19 PROCEDURE — 86334 IMMUNOFIX E-PHORESIS SERUM: CPT | Mod: 26 | Performed by: PATHOLOGY

## 2023-10-19 PROCEDURE — 83521 IG LIGHT CHAINS FREE EACH: CPT

## 2023-10-19 PROCEDURE — 84155 ASSAY OF PROTEIN SERUM: CPT

## 2023-10-19 PROCEDURE — 36415 COLL VENOUS BLD VENIPUNCTURE: CPT

## 2023-10-19 PROCEDURE — 99215 OFFICE O/P EST HI 40 MIN: CPT

## 2023-10-19 PROCEDURE — 258N000003 HC RX IP 258 OP 636

## 2023-10-19 PROCEDURE — 86334 IMMUNOFIX E-PHORESIS SERUM: CPT | Performed by: PATHOLOGY

## 2023-10-19 RX ORDER — DIPHENHYDRAMINE HYDROCHLORIDE 50 MG/ML
50 INJECTION INTRAMUSCULAR; INTRAVENOUS
Status: CANCELLED
Start: 2023-10-19

## 2023-10-19 RX ORDER — HEPARIN SODIUM (PORCINE) LOCK FLUSH IV SOLN 100 UNIT/ML 100 UNIT/ML
5 SOLUTION INTRAVENOUS
Status: CANCELLED | OUTPATIENT
Start: 2023-10-19

## 2023-10-19 RX ORDER — METHYLPREDNISOLONE SODIUM SUCCINATE 125 MG/2ML
125 INJECTION, POWDER, LYOPHILIZED, FOR SOLUTION INTRAMUSCULAR; INTRAVENOUS
Status: CANCELLED
Start: 2023-10-19

## 2023-10-19 RX ORDER — DEXAMETHASONE 4 MG/1
12 TABLET ORAL ONCE
Status: CANCELLED
Start: 2023-10-19 | End: 2023-10-19

## 2023-10-19 RX ORDER — MEPERIDINE HYDROCHLORIDE 25 MG/ML
25 INJECTION INTRAMUSCULAR; INTRAVENOUS; SUBCUTANEOUS EVERY 30 MIN PRN
Status: CANCELLED | OUTPATIENT
Start: 2023-10-19

## 2023-10-19 RX ORDER — ALBUTEROL SULFATE 0.83 MG/ML
2.5 SOLUTION RESPIRATORY (INHALATION)
Status: CANCELLED | OUTPATIENT
Start: 2023-10-19

## 2023-10-19 RX ORDER — ALBUTEROL SULFATE 90 UG/1
1-2 AEROSOL, METERED RESPIRATORY (INHALATION)
Status: CANCELLED
Start: 2023-10-19

## 2023-10-19 RX ORDER — EPINEPHRINE 1 MG/ML
0.3 INJECTION, SOLUTION INTRAMUSCULAR; SUBCUTANEOUS EVERY 5 MIN PRN
Status: CANCELLED | OUTPATIENT
Start: 2023-10-19

## 2023-10-19 RX ORDER — ACETAMINOPHEN 325 MG/1
650 TABLET ORAL ONCE
Status: CANCELLED
Start: 2023-10-19 | End: 2023-10-19

## 2023-10-19 RX ORDER — DIPHENHYDRAMINE HCL 25 MG
50 CAPSULE ORAL ONCE
Status: CANCELLED
Start: 2023-10-19 | End: 2023-10-19

## 2023-10-19 RX ORDER — NALOXONE HYDROCHLORIDE 0.4 MG/ML
0.2 INJECTION, SOLUTION INTRAMUSCULAR; INTRAVENOUS; SUBCUTANEOUS
Status: CANCELLED | OUTPATIENT
Start: 2023-10-19

## 2023-10-19 RX ORDER — HEPARIN SODIUM,PORCINE 10 UNIT/ML
5 VIAL (ML) INTRAVENOUS
Status: CANCELLED | OUTPATIENT
Start: 2023-10-19

## 2023-10-19 RX ADMIN — SODIUM CHLORIDE 250 ML: 9 INJECTION, SOLUTION INTRAVENOUS at 16:04

## 2023-10-19 RX ADMIN — ZOLEDRONIC ACID 3 MG: 4 INJECTION, SOLUTION, CONCENTRATE INTRAVENOUS at 16:04

## 2023-10-19 RX ADMIN — DARATUMUMAB AND HYALURONIDASE-FIHJ (HUMAN RECOMBINANT) 1800 MG: 1800; 30000 INJECTION SUBCUTANEOUS at 16:05

## 2023-10-19 ASSESSMENT — PAIN SCALES - GENERAL: PAINLEVEL: NO PAIN (0)

## 2023-10-19 NOTE — NURSING NOTE
COVID Swab and Respiratory Panel swab collected without complication and sent to lab.  Pt tolerated well and was discharged.    Macarena Daley LPN  10/19/2023

## 2023-10-19 NOTE — PATIENT INSTRUCTIONS
[facility] Triage and after hours / weekends / holidays:  [phone]    Please call the triage or after hours line if you experience a temperature greater than or equal to 100.4, shaking chills, have uncontrolled nausea, vomiting and/or diarrhea, dizziness, shortness of breath, chest pain, bleeding, unexplained bruising, or if you have any other new/concerning symptoms, questions or concerns.      If you are having any concerning symptoms or wish to speak to a provider before your next infusion visit, please call triage to notify them so we can adequately serve you.     If you need a refill on a narcotic prescription or other medication, please call before your infusion appointment.                 October 2023 Sunday Monday Tuesday Wednesday Thursday Friday Saturday   1     2     3     4     5    NURSE ONLY   2:15 PM   (15 min.)   Nurse, [facility] [place] 6     7       8     9     10     11     12    OFFICE VISIT   9:20 AM   (20 min.)   Casey Gunter NP   [facility] 13    SHORT   4:15 PM   (15 min.)   Kyle Reynolds DO   [facility] [place]    XR CHEST 2 VIEWS   4:30 PM   (20 min.)   [id number]   [facility] 14       15     16     17     18     19    LAB PERIPHERAL   1:30 PM   (15 min.)   [facility] LAB DRAW   [facility]    RETURN CCSL   1:45 PM   (45 min.)   Ana Cristina Cleveland APRN CNP   [facility]    ONC INFUSION 1 HR (60 MIN)   3:30 PM   (60 min.)    ONC INFUSION NURSE   [facility] 20     21       22     23     24     25     26     27     28       29     30     31                                     November 2023 Sunday Monday Tuesday Wednesday Thursday Friday Saturday                  1     2     3     4       5     6     7     8     9     10    DX HIP/PELVIS/SPINE   2:15 PM   (30 min.)   [id number]     [facility] [place] 11       12     13    LAB PERIPHERAL   9:30 AM   (15 min.)    [facility] DRAW   [facility]    RETURN CCSL   9:45 AM   (45 min.)   Brittni Phan APRN CNP   [facility]    ONC INFUSION 1 HR (60 MIN)  11:00 AM   (60 min.)    ONC INFUSION NURSE   [facility] 14     15    PET ONCOLOGY WHOLE BODY   7:00 AM   (30 min.)   [id number]   [facility] 16    LAB PERIPHERAL   9:00 AM   (15 min.)    [facility] DRAW   [facility]    BMT RETURN   9:30 AM   (30 min.)    BMT DOROTA #2   [facility] BMT BONE MARROW BIOPSY  12:00 PM   (90 min.)    ASC BONE MARROW BIOPSY DOROTA   [facility]    BIOPSY, BONE MARROW  12:00 PM   Sneha Vance PA-C   [facility] OR    Outpatient Visit  12:00 PM   [facility] 17     18       19     20     21     22     23     24    BMT ANNIVERSARY VISIT  12:00 PM   (30 min.)    BMT AUTO CELL THERAPY   [facility] [place] 25       26     27     28     29     30                               Lab Results:  Recent Results (from the past 12 hour(s))   Comprehensive metabolic panel    Collection Time: 10/19/23  2:01 PM   Result Value Ref Range    Sodium 137 135 - 145 mmol/L    Potassium 4.5 3.4 - 5.3 mmol/L    Carbon Dioxide (CO2) 24 22 - 29 mmol/L    Anion Gap 10 7 - 15 mmol/L    Urea Nitrogen 10.6 6.0 - 20.0 mg/dL    Creatinine 1.24 (H) 0.67 - 1.17 mg/dL    GFR Estimate 69 >60 mL/min/1.73m2    Calcium 9.1 8.6 - 10.0 mg/dL    Chloride 103 98 - 107 mmol/L    Glucose 95 70 - 99 mg/dL    Alkaline Phosphatase 66 40 - 129 U/L    AST 34 0 - 45 U/L    ALT 19 0 - 70 U/L    Protein Total 6.9 6.4 - 8.3 g/dL    Albumin 4.4 3.5 - 5.2 g/dL    Bilirubin  Total 0.3 <=1.2 mg/dL   CBC with platelets and differential    Collection Time: 10/19/23  2:01 PM   Result Value Ref Range    WBC Count 4.9 4.0 - 11.0 10e3/uL    RBC Count 4.42 4.40 - 5.90 10e6/uL    Hemoglobin 13.4 13.3 - 17.7 g/dL    Hematocrit 40.6 40.0 - 53.0 %    MCV 92 78 - 100 fL    MCH 30.3 26.5 - 33.0 pg    MCHC 33.0 31.5 - 36.5 g/dL    RDW 15.6 (H) 10.0 - 15.0 %    Platelet Count 395 150 - 450 10e3/uL    % Neutrophils 44 %    % Lymphocytes 33 %    % Monocytes 15 %    Mids % (Monos, Eos, Basos)      % Eosinophils 7 %    % Basophils 1 %    % Immature Granulocytes 0 %    NRBCs per 100 WBC 0 <1 /100    Absolute Neutrophils 2.1 1.6 - 8.3 10e3/uL    Absolute Lymphocytes 1.7 0.8 - 5.3 10e3/uL    Absolute Monocytes 0.7 0.0 - 1.3 10e3/uL    Mids Abs (Monos, Eos, Basos)      Absolute Eosinophils 0.4 0.0 - 0.7 10e3/uL    Absolute Basophils 0.1 0.0 - 0.2 10e3/uL    Absolute Immature Granulocytes 0.0 <=0.4 10e3/uL    Absolute NRBCs 0.0 10e3/uL

## 2023-10-19 NOTE — PROGRESS NOTES
Infusion Nursing Note:  June Ronquillo presents today for Day 1 Cycle 23 Daratumumab. IV Zometa  Patient seen by provider today: Yes: Ana Cristina Cleveland CNP   present during visit today: Not Applicable.    Note: Patient presents to infusion feeling ok. Pt denies new acute discomfort and states no acute complaints or concerns not addressed by DOROTA today. Pt confirms no issues with teeth/jaw in regards to IV Zometa and that he is taking Calcium/Vitamin D supplements at home.      Intravenous Access:  Peripheral IV placed.    Treatment Conditions:  Lab Results   Component Value Date    HGB 13.4 10/19/2023    WBC 4.9 10/19/2023    ANEU 4.7 03/13/2023    ANEUTAUTO 2.1 10/19/2023     10/19/2023        Lab Results   Component Value Date     10/19/2023    POTASSIUM 4.5 10/19/2023    MAG 2.6 (H) 04/28/2023    CR 1.24 (H) 10/19/2023    HARDEEP 9.1 10/19/2023    BILITOTAL 0.3 10/19/2023    ALBUMIN 4.4 10/19/2023    ALT 19 10/19/2023    AST 34 10/19/2023     Corrected Calcium: 8.8  Results reviewed, labs MET treatment parameters, ok to proceed with treatment.      Post Infusion Assessment:  Patient tolerated infusion without incident.  Patient tolerated 1 subcutaneous injection via RLQ of abdomen without incident.  Blood return noted pre and post infusion.  Site patent and intact, free from redness, edema or discomfort.  No evidence of extravasations.  Access discontinued per protocol.       Discharge Plan:   Patient declined prescription refills.  Discharge instructions reviewed with: Patient.  Patient and/or family verbalized understanding of discharge instructions and all questions answered.  AVS to patient via [facility].  Patient will return 11/13 for next appointment.   Patient discharged in stable condition accompanied by: self.  Departure Mode: Ambulatory.      Floyd Rivero RN

## 2023-10-19 NOTE — NURSING NOTE
Chief Complaint   Patient presents with    Oncology Clinic Visit     Multiple Myeloma     Blood Draw     Labs drawn via piv by rn in lab. VS taken.     Labs drawn from PIV placed by RN. Line flushed with saline. Vitals taken. Pt checked in for appointment(s).    Skinny Jensen RN

## 2023-10-19 NOTE — LETTER
10/19/2023         RE: June Ronquillo  [address]  [place] [id number]        Dear Colleague,    Thank you for referring your patient, June Ronquillo, to the [facility]. Please see a copy of my visit note below.    [facility]  Date of visit: 10/19/2023        Oncologic Hx:   - 4/30/2021 M spike of 34.8 in urine.M spike in blood 0.2; IgG 702 with depressed IgA and IgM. Beta 2 microglobulin 2.7. Lambda  with K/L ratio of 0.01. WBC 11.1 with absolute eos of 1.0. hgb, plt, Cr (1.1), and albumin WNL.    -4/30/2021 CT abd/pelvis showed sclerotic marrow changes with numerous lytic appearing lesions throughout the imaged portions of the spine, pelvis and ribs.      -PET scan 5/11/2021 Numerous osteolytic lesions which predominantly demonstrate uptake below liver background levels. There is one intraosseous lesion in the left lateral 9th rib that demonstrates uptake above background, possibly due to nondisplaced fracture. Adjacent to this lesion is mild  hypermetabolism to the left pleura, with new small left pleural effusion, suspicious for malignant effusion versus posttraumatic effusion. Pleural uptake may represent extramedullary myeloma extending along the intercostal space versus inflammation.    - BM bx 5/17/2021 with flow showing 4.0% plasma cells which express CD19 (dim), CD38 (bright), CD45 (dim), , and monotypic cytoplasmic lambda immunoglobulin light chains but lack CD20 and CD56.  Hypercellular bone marrow for age (approximately 75% cellularity) with adequate trilineage hematopoiesis. Plasma cell infiltrate: Interstitial, lambda monotypic and representing approximately 60% the bone marrow cellularity, by  immunohistochemical stain. Cytpgenetics with 2 related hypodiploid clones. One with loss of Y, monosomy 13 and 14 (5%) and one with translocation of 8p and 14, derivative 16 with long arm replaced by extra  copy 1q,monosomy 21. FISH showed gain of 1q, loss of 13 and 14, IGH-MYC fusion t(8:14)    - Started Miracle-RVd 21 day with velcade on days 1,8,15.     -Cycle 2 Miracle-RVd. Dose reduce dex to 80 mg d/t fatigue and stomach upset on dex days. Also having cough with basilar streaking on CXR    - 8/31/2021 BM bx -rare suspicious plasma cells in touch imprint. No increase in plasma cells by morph.    -8/31/2021 PET/CT Diffuse osteolytic lesions throughout the axial and appendicular skeleton. Increased sclerosis since prior exam 5/11/2021 without increased metabolism or size.  Hypermetabolic pretracheal lymph node as well as hypermetabolic level 2 lymph nodes within the right neck. These lymph nodes are likely reactive from autoimmune activation via medical therapy. Hypermetabolic subcentimeter nodules within the thyroid gland    - 11/11/2021 Autologous BMT    - 2/21/22 started maintenance with Revlimid 10 mg daily and daratumumab monthly; did Revlimid alone for C1 due to low IgG levels, added daratumumab starting C2    Interval Hx:   June presents to clinic for toxicity check prior to C23D1.  He is overall doing well.  He has had an ongoing cough for the past month with a runny nose.  He is on day 5 of antibiotics and reports no improvement.  His energy levels have been a little lower due to respiratory infection.    Reports he has been having intermittent loose stools, 1 per day.      He is eating and drinking well.  Denies fevers/chills, SOB, chest pain, bleeding issues, neuropathy, N/V/, constipation, rashes/sores, night sweats.      Physical Exam:  BP (!) 143/92   Pulse 74   Temp 98.3  F (36.8  C)   Resp 16   Wt 67.5 kg (148 lb 14.4 oz)   SpO2 100%   BMI 25.11 kg/m      General: No acute distress  HEENT: Sclera anicteric. Oral exam deferred  Lymph: No lymphadenopathy in neck  Heart: Regular, rate, and rhythm  Lungs: Wheezes upper lobes to ascultation bilaterally  Extremities: no lower extremity edema  Neuro:  Cranial nerves grossly intact  Rash: none on exposed skin       Most Recent 3 CBC's:  Recent Labs   Lab Test 10/19/23  1401 09/11/23  1332 07/28/23  1213   WBC 4.9 5.7 5.3   HGB 13.4 12.4* 10.8*   MCV 92 90 91    333 322   ANEUTAUTO 2.1 2.7 2.0     Most Recent 3 BMP's:  Recent Labs   Lab Test 10/19/23  1401 09/11/23  1332 08/08/23  1527    137 139   POTASSIUM 4.5 3.6 4.1   CHLORIDE 103 103 108*   CO2 24 23 21*   BUN 10.6 14.8 7.1   CR 1.24* 1.31* 1.15   ANIONGAP 10 11 10   HARDEEP 9.1 9.1 9.0   GLC 95 137* 103*   PROTTOTAL 6.9 6.5 6.3*   ALBUMIN 4.4 4.6 4.4    Most Recent 3 LFT's:  Recent Labs   Lab Test 10/19/23  1401 09/11/23  1332 08/08/23  1527   AST 34 26 36   ALT 19 14 21   ALKPHOS 66 51 61   BILITOTAL 0.3 0.4 0.4    Most Recent 2 TSH and T4:  Recent Labs   Lab Test 06/26/23  1442 12/19/22  1103 06/17/22  1332 11/14/21  1103 10/20/21  1145   TSH 0.58 0.93 0.07*   < > 0.70   T4  --   --  1.36  --  0.97    < > = values in this interval not displayed.      Latest Reference Range & Units 09/11/23 13:32   Albumin Fraction 3.7 - 5.1 g/dL 4.2   Alpha 1 Fraction 0.2 - 0.4 g/dL 0.3   Alpha 2 Fraction 0.5 - 0.9 g/dL 0.5   Beta Fraction 0.6 - 1.0 g/dL 0.7   ELP Interpretation:  Small monoclonal protein (0.1 g/dL) seen in the gamma fraction. See immunofixation report on same specimen. Hypogammaglobulinemia. Pathologic significance requires clinical correlation. Rizwan Ace M.D., Ph.D., Pathologist.   Gamma Fraction 0.7 - 1.6 g/dL 0.5 (L)   IGA 84 - 499 mg/dL 77 (L)    - 1,616 mg/dL 548 (L)   IGM 35 - 242 mg/dL 26 (L)   Immunofixation ELP  Faint monoclonal IgG immunoglobulin of kappa light chain type. Monoclonal antibody therapeutics (e.g. Daratumumab) may appear as monoclonal proteins on serum electrophoresis and immunofixation. Results should be interpreted with caution. Pathologic significance requires clinical correlation. Rizwan Ace M.D., Ph.D., Pathologist   Kappa Free Lt Chain 0.33 -  1.94 mg/dL 1.16   Kappa Lambda Ratio 0.26 - 1.65  1.07   Lambda Free Lt Chain 0.57 - 2.63 mg/dL 1.08   Monoclonal Peak <=0.0 g/dL 0.1 (H)   Total Protein Serum for ELP 6.4 - 8.3 g/dL 6.2 (L)   (L): Data is abnormally low  (H): Data is abnormally high    I reviewed the above labs today.      Assessment and Plan  Logan has multiple myeloma with high risk cytogenetics. He got Vidya-RVD with VGPR, then underwent autologous transplant 11/11/21. Continue acyclovir daily for viral ppx and bactrim M/T for PJP ppx. Given his high risk cytogenetics he started vidya + revlimid maintenance therapy 2/21/22.. He had severe flu-like effects from his first dose of Zometa but he has been tolerating monthly Zometa dosed at 3 mg. Continue Vit D.   - Continue vidya + revlimid today, labs stable. Continue zometa  - Get clonoseq on upcoming BMBx and consider de-escalating from dual maintenance if negative    Cough:  Rhinorrhea:  Patient reports ongoing cough over the past months and being on antibiotics x5 days with no improvement.  He had a chest xray 10/13 that was unremarkable.  He denies fevers or SOB, low concern for pneumonia.  - Covid swab and RVP today (10/19)    Elevated serum creatinine  Creatinine is trending downward.      Hypogammaglobulinemia Start monthly IV Ig whenever IgG <300  - 9/11 IgG 548  - 10/19 level pending     Normocytic Anemia: likely d/t revlimid and daratumumab. Will keep monitoring and dose reduce if needed.      Elevated BP:  We discussed his elevated BP in clinic and recommended following with his PCP.  Patient reports he is currently taking cold medications which could be contributing to his BP elevation.      40 minutes spent on the date of the encounter doing chart review, review of test results, interpretation of tests, patient visit, and documentation     PATTY Jamil CNP

## 2023-10-19 NOTE — NURSING NOTE
"Oncology Rooming Note    October 19, 2023 2:09 PM   June Ronquillo is a 53 year old male who presents for:    Chief Complaint   Patient presents with    Oncology Clinic Visit     Multiple Myeloma      Initial Vitals: BP (!) 143/92   Pulse 74   Temp 98.3  F (36.8  C)   Resp 16   Wt 67.5 kg (148 lb 14.4 oz)   SpO2 100%   BMI 25.11 kg/m   Estimated body mass index is 25.11 kg/m  as calculated from the following:    Height as of 10/12/23: 1.64 m (5' 4.57\").    Weight as of this encounter: 67.5 kg (148 lb 14.4 oz). Body surface area is 1.75 meters squared.  No Pain (0) Comment: Data Unavailable   No LMP for male patient.  Allergies reviewed: Yes  Medications reviewed: Yes    Medications: Medication refills not needed today.  Pharmacy name entered into [employer]:    [facility] [place], [place] - [address]  [facility] - [place], [place] - [address] [facility] [place], [place] - [address] [facility] [phone]  [facility] [place], [place] - [address] [facility]-    Clinical concerns:        Ivana Espinoza              "

## 2023-10-20 ENCOUNTER — MYC MEDICAL ADVICE (OUTPATIENT)
Dept: ONCOLOGY | Facility: CLINIC | Age: 53
End: 2023-10-20
Payer: COMMERCIAL

## 2023-10-20 DIAGNOSIS — B34.8 PARAINFLUENZA: Primary | ICD-10-CM

## 2023-10-20 LAB
ALBUMIN SERPL ELPH-MCNC: 4.1 G/DL (ref 3.7–5.1)
ALPHA1 GLOB SERPL ELPH-MCNC: 0.4 G/DL (ref 0.2–0.4)
ALPHA2 GLOB SERPL ELPH-MCNC: 0.8 G/DL (ref 0.5–0.9)
B-GLOBULIN SERPL ELPH-MCNC: 0.8 G/DL (ref 0.6–1)
GAMMA GLOB SERPL ELPH-MCNC: 0.6 G/DL (ref 0.7–1.6)
IGA SERPL-MCNC: 90 MG/DL (ref 84–499)
IGG SERPL-MCNC: 549 MG/DL (ref 610–1616)
IGM SERPL-MCNC: 43 MG/DL (ref 35–242)
KAPPA LC FREE SER-MCNC: 1.37 MG/DL (ref 0.33–1.94)
KAPPA LC FREE/LAMBDA FREE SER NEPH: 1.19 {RATIO} (ref 0.26–1.65)
LAMBDA LC FREE SERPL-MCNC: 1.15 MG/DL (ref 0.57–2.63)
M PROTEIN SERPL ELPH-MCNC: 0 G/DL
PROT PATTERN SERPL ELPH-IMP: ABNORMAL
PROT PATTERN SERPL IFE-IMP: NORMAL
SARS-COV-2 RNA RESP QL NAA+PROBE: NEGATIVE

## 2023-10-20 RX ORDER — BENZONATATE 100 MG/1
100 CAPSULE ORAL 3 TIMES DAILY PRN
Qty: 30 CAPSULE | Refills: 0 | Status: SHIPPED | OUTPATIENT
Start: 2023-10-20 | End: 2024-01-24

## 2023-10-20 RX ORDER — ALBUTEROL SULFATE 90 UG/1
2 AEROSOL, METERED RESPIRATORY (INHALATION) 4 TIMES DAILY
Qty: 18 G | Refills: 0 | Status: SHIPPED | OUTPATIENT
Start: 2023-10-20 | End: 2024-02-02

## 2023-10-23 ENCOUNTER — TELEPHONE (OUTPATIENT)
Dept: GASTROENTEROLOGY | Facility: CLINIC | Age: 53
End: 2023-10-23
Payer: COMMERCIAL

## 2023-10-23 DIAGNOSIS — I25.10 CVD (CARDIOVASCULAR DISEASE): ICD-10-CM

## 2023-10-23 RX ORDER — ROSUVASTATIN CALCIUM 40 MG/1
40 TABLET, COATED ORAL DAILY
Qty: 90 TABLET | Refills: 3 | Status: SHIPPED | OUTPATIENT
Start: 2023-10-23 | End: 2024-10-01

## 2023-10-23 NOTE — TELEPHONE ENCOUNTER
Team received request for refill of Rosuvastatin, from Hua Kang by patient.    Last OV with Dr. Craig  June 2023    Last labwork   6\23\23   LDL 56    [facility] Refill Guideline reviewed.  Medication meets criteria for refill.    Answered patient's Hua Kang message    Madalyn Abdi RN on 10/23/2023 at 4:49 PM

## 2023-10-23 NOTE — TELEPHONE ENCOUNTER
"Endoscopy Scheduling Screen    Have you had a positive Covid test in the last 14 days?  No    Are you active on MyChart?   Yes    What insurance is in the chart?  Other:  PREFERRED ONE    Ordering/Referring Provider:     Casey Gunter NP      (If ordering provider performs procedure, schedule with ordering provider unless otherwise instructed. )    BMI: Estimated body mass index is 25.11 kg/m  as calculated from the following:    Height as of 10/12/23: 1.64 m (5' 4.57\").    Weight as of 10/19/23: 67.5 kg (148 lb 14.4 oz).     Sedation Ordered  moderate sedation.   If patient BMI > 50 do not schedule in [facility].    If patient BMI > 45 do not schedule at [facility].    Are you taking methadone or Suboxone?  Yes   Patient must be scheduled with MAC sedation.    Please send In Basket alert to Ije with MRN.    Are you taking any prescription medications for pain 3 or more times per week?   No    Do you have a history of malignant hyperthermia or adverse reaction to anesthesia?  No    (Females) Are you currently pregnant?   No     Have you been diagnosed or told you have pulmonary hypertension?   No    Do you have an LVAD?  No    Have you been told you have moderate to severe sleep apnea?  No    Have you been told you have COPD, asthma, or any other lung disease?  No    Do you have any heart conditions?  Yes     In the past 6 months, have you had any hospitalizations for heart related issues including cardiomyopathy, heart attack, or stent placement?  No    Do you have any implantable devices in your body (pacemaker, ICD)?  Other STENT    Do you take nitroglycerine?  No    Have you ever had an organ transplant?   No    Have you ever had or are you awaiting a heart or lung transplant?   No    Have you had a stroke or transient ischemic attack (TIA aka \"mini stroke\" in the last 6 months?   No    Have you been diagnosed with or been told you have cirrhosis of the liver?   No    Are you currently on dialysis?   No    Do you need " "assistance transferring?   No    BMI: Estimated body mass index is 25.11 kg/m  as calculated from the following:    Height as of 10/12/23: 1.64 m (5' 4.57\").    Weight as of 10/19/23: 67.5 kg (148 lb 14.4 oz).     Is patients BMI > 40 and scheduling location [facility]?  No    Do you take an injectable medication for weight loss or diabetes (excluding insulin)?  No    Do you take the medication Naltrexone?  No    Do you take blood thinners?  No       Prep   Are you currently on dialysis or do you have chronic kidney disease?  No    Do you have a diagnosis of diabetes?  No    Do you have a diagnosis of cystic fibrosis (CF)?  No    On a regular basis do you go 3 -5 days between bowel movements?  No    BMI > 40?  No    Preferred Pharmacy:    [place], [place] - [address]  [address]  [address] [address]  Phone: [phone] Fax: [phone] Alternate Fax: [phone], [phone], [phone]        Final Scheduling Details   Colonoscopy prep sent?  Standard MiraLAX    Procedure scheduled  Colonoscopy    Surgeon:  NATHALIA     Date of procedure:  2/1/24     Pre-OP / PAC:   No - Not required for this site.    Location  [facility] - [facility] - Per order.    Sedation   Moderate Sedation - Per order.      Patient Reminders:   You will receive a call from a Nurse to review instructions and health history.  This assessment must be completed prior to your procedure.  Failure to complete the Nurse assessment may result in the procedure being cancelled.      On the day of your procedure, please designate an adult(s) who can drive you home stay with you for the next 24 hours. The medicines used in the exam will make you sleepy. You will not be able to drive.      You cannot take public transportation, ride share services, or non-medical taxi service without a responsible caregiver.  Medical transport services are allowed with the requirement that a responsible caregiver will receive you at your " destination.  We require that drivers and caregivers are confirmed prior to your procedure.

## 2023-10-26 NOTE — TELEPHONE ENCOUNTER
Patient returned call and left voicemail message with update blood pressure reading.      Last Blood Pressure: 148/82  Last Heart Rate: 77  Date: 10/26/2023  Location: [facility]    Today's Blood Pressure: 118/77  Location: Home BP    Patient reported blood pressure updated in Epic. Blood pressure falls within [place] Community Measures guidelines.  Patient will follow up as previously advised.   Lakeisha Harris LPN

## 2023-10-30 ENCOUNTER — LAB (OUTPATIENT)
Dept: LAB | Facility: CLINIC | Age: 53
End: 2023-10-30
Payer: COMMERCIAL

## 2023-10-30 DIAGNOSIS — R79.89 LOW TESTOSTERONE IN MALE: ICD-10-CM

## 2023-10-30 DIAGNOSIS — C90.00 MULTIPLE MYELOMA NOT HAVING ACHIEVED REMISSION (H): ICD-10-CM

## 2023-10-30 PROCEDURE — 84403 ASSAY OF TOTAL TESTOSTERONE: CPT | Performed by: INTERNAL MEDICINE

## 2023-10-30 PROCEDURE — 99000 SPECIMEN HANDLING OFFICE-LAB: CPT | Performed by: PATHOLOGY

## 2023-10-30 PROCEDURE — 36415 COLL VENOUS BLD VENIPUNCTURE: CPT | Performed by: PATHOLOGY

## 2023-10-31 DIAGNOSIS — C90.00 MULTIPLE MYELOMA NOT HAVING ACHIEVED REMISSION (H): Primary | ICD-10-CM

## 2023-10-31 RX ORDER — ACETAMINOPHEN 325 MG/1
650 TABLET ORAL ONCE
Status: CANCELLED
Start: 2024-01-12 | End: 2024-01-05

## 2023-10-31 RX ORDER — ALBUTEROL SULFATE 0.83 MG/ML
2.5 SOLUTION RESPIRATORY (INHALATION)
Status: CANCELLED | OUTPATIENT
Start: 2023-11-10

## 2023-10-31 RX ORDER — EPINEPHRINE 1 MG/ML
0.3 INJECTION, SOLUTION INTRAMUSCULAR; SUBCUTANEOUS EVERY 5 MIN PRN
Status: CANCELLED | OUTPATIENT
Start: 2024-01-12

## 2023-10-31 RX ORDER — ALBUTEROL SULFATE 90 UG/1
1-2 AEROSOL, METERED RESPIRATORY (INHALATION)
Status: CANCELLED
Start: 2024-01-12

## 2023-10-31 RX ORDER — ALBUTEROL SULFATE 90 UG/1
1-2 AEROSOL, METERED RESPIRATORY (INHALATION)
Status: CANCELLED
Start: 2023-11-10

## 2023-10-31 RX ORDER — DIPHENHYDRAMINE HCL 25 MG
50 CAPSULE ORAL ONCE
Status: CANCELLED
Start: 2023-12-08 | End: 2023-12-08

## 2023-10-31 RX ORDER — METHYLPREDNISOLONE SODIUM SUCCINATE 125 MG/2ML
125 INJECTION, POWDER, LYOPHILIZED, FOR SOLUTION INTRAMUSCULAR; INTRAVENOUS
Status: CANCELLED
Start: 2023-11-10

## 2023-10-31 RX ORDER — MEPERIDINE HYDROCHLORIDE 25 MG/ML
25 INJECTION INTRAMUSCULAR; INTRAVENOUS; SUBCUTANEOUS EVERY 30 MIN PRN
Status: CANCELLED | OUTPATIENT
Start: 2023-11-10

## 2023-10-31 RX ORDER — ALBUTEROL SULFATE 90 UG/1
1-2 AEROSOL, METERED RESPIRATORY (INHALATION)
Status: CANCELLED
Start: 2023-12-08

## 2023-10-31 RX ORDER — HEPARIN SODIUM (PORCINE) LOCK FLUSH IV SOLN 100 UNIT/ML 100 UNIT/ML
5 SOLUTION INTRAVENOUS
Status: CANCELLED | OUTPATIENT
Start: 2024-01-12

## 2023-10-31 RX ORDER — METHYLPREDNISOLONE SODIUM SUCCINATE 125 MG/2ML
125 INJECTION, POWDER, LYOPHILIZED, FOR SOLUTION INTRAMUSCULAR; INTRAVENOUS
Status: CANCELLED
Start: 2024-01-12

## 2023-10-31 RX ORDER — EPINEPHRINE 1 MG/ML
0.3 INJECTION, SOLUTION INTRAMUSCULAR; SUBCUTANEOUS EVERY 5 MIN PRN
Status: CANCELLED | OUTPATIENT
Start: 2023-12-08

## 2023-10-31 RX ORDER — DEXAMETHASONE 4 MG/1
12 TABLET ORAL ONCE
Status: CANCELLED
Start: 2023-12-08 | End: 2023-12-08

## 2023-10-31 RX ORDER — ALBUTEROL SULFATE 0.83 MG/ML
2.5 SOLUTION RESPIRATORY (INHALATION)
Status: CANCELLED | OUTPATIENT
Start: 2023-12-08

## 2023-10-31 RX ORDER — HEPARIN SODIUM (PORCINE) LOCK FLUSH IV SOLN 100 UNIT/ML 100 UNIT/ML
5 SOLUTION INTRAVENOUS
Status: CANCELLED | OUTPATIENT
Start: 2023-12-08

## 2023-10-31 RX ORDER — DEXAMETHASONE 4 MG/1
12 TABLET ORAL ONCE
Status: CANCELLED
Start: 2024-01-12 | End: 2024-01-05

## 2023-10-31 RX ORDER — DIPHENHYDRAMINE HYDROCHLORIDE 50 MG/ML
50 INJECTION INTRAMUSCULAR; INTRAVENOUS
Status: CANCELLED
Start: 2023-11-10

## 2023-10-31 RX ORDER — MEPERIDINE HYDROCHLORIDE 25 MG/ML
25 INJECTION INTRAMUSCULAR; INTRAVENOUS; SUBCUTANEOUS EVERY 30 MIN PRN
Status: CANCELLED | OUTPATIENT
Start: 2023-12-08

## 2023-10-31 RX ORDER — ACETAMINOPHEN 325 MG/1
650 TABLET ORAL ONCE
Status: CANCELLED
Start: 2023-12-08 | End: 2023-12-08

## 2023-10-31 RX ORDER — NALOXONE HYDROCHLORIDE 0.4 MG/ML
0.2 INJECTION, SOLUTION INTRAMUSCULAR; INTRAVENOUS; SUBCUTANEOUS
Status: CANCELLED | OUTPATIENT
Start: 2024-01-12

## 2023-10-31 RX ORDER — DIPHENHYDRAMINE HYDROCHLORIDE 50 MG/ML
50 INJECTION INTRAMUSCULAR; INTRAVENOUS
Status: CANCELLED
Start: 2023-12-08

## 2023-10-31 RX ORDER — DIPHENHYDRAMINE HCL 25 MG
50 CAPSULE ORAL ONCE
Status: CANCELLED
Start: 2024-01-12 | End: 2024-01-05

## 2023-10-31 RX ORDER — NALOXONE HYDROCHLORIDE 0.4 MG/ML
0.2 INJECTION, SOLUTION INTRAMUSCULAR; INTRAVENOUS; SUBCUTANEOUS
Status: CANCELLED | OUTPATIENT
Start: 2023-12-08

## 2023-10-31 RX ORDER — DIPHENHYDRAMINE HCL 25 MG
50 CAPSULE ORAL ONCE
Status: CANCELLED
Start: 2023-11-10 | End: 2023-11-10

## 2023-10-31 RX ORDER — HEPARIN SODIUM,PORCINE 10 UNIT/ML
5 VIAL (ML) INTRAVENOUS
Status: CANCELLED | OUTPATIENT
Start: 2023-12-08

## 2023-10-31 RX ORDER — HEPARIN SODIUM,PORCINE 10 UNIT/ML
5 VIAL (ML) INTRAVENOUS
Status: CANCELLED | OUTPATIENT
Start: 2023-11-10

## 2023-10-31 RX ORDER — NALOXONE HYDROCHLORIDE 0.4 MG/ML
0.2 INJECTION, SOLUTION INTRAMUSCULAR; INTRAVENOUS; SUBCUTANEOUS
Status: CANCELLED | OUTPATIENT
Start: 2023-11-10

## 2023-10-31 RX ORDER — HEPARIN SODIUM,PORCINE 10 UNIT/ML
5 VIAL (ML) INTRAVENOUS
Status: CANCELLED | OUTPATIENT
Start: 2024-01-12

## 2023-10-31 RX ORDER — ACETAMINOPHEN 325 MG/1
650 TABLET ORAL ONCE
Status: CANCELLED
Start: 2023-11-10 | End: 2023-11-10

## 2023-10-31 RX ORDER — ALBUTEROL SULFATE 0.83 MG/ML
2.5 SOLUTION RESPIRATORY (INHALATION)
Status: CANCELLED | OUTPATIENT
Start: 2024-01-12

## 2023-10-31 RX ORDER — DEXAMETHASONE 4 MG/1
12 TABLET ORAL ONCE
Status: CANCELLED
Start: 2023-11-10 | End: 2023-11-10

## 2023-10-31 RX ORDER — MEPERIDINE HYDROCHLORIDE 25 MG/ML
25 INJECTION INTRAMUSCULAR; INTRAVENOUS; SUBCUTANEOUS EVERY 30 MIN PRN
Status: CANCELLED | OUTPATIENT
Start: 2024-01-12

## 2023-10-31 RX ORDER — DIPHENHYDRAMINE HYDROCHLORIDE 50 MG/ML
50 INJECTION INTRAMUSCULAR; INTRAVENOUS
Status: CANCELLED
Start: 2024-01-12

## 2023-10-31 RX ORDER — HEPARIN SODIUM (PORCINE) LOCK FLUSH IV SOLN 100 UNIT/ML 100 UNIT/ML
5 SOLUTION INTRAVENOUS
Status: CANCELLED | OUTPATIENT
Start: 2023-11-10

## 2023-10-31 RX ORDER — METHYLPREDNISOLONE SODIUM SUCCINATE 125 MG/2ML
125 INJECTION, POWDER, LYOPHILIZED, FOR SOLUTION INTRAMUSCULAR; INTRAVENOUS
Status: CANCELLED
Start: 2023-12-08

## 2023-10-31 RX ORDER — EPINEPHRINE 1 MG/ML
0.3 INJECTION, SOLUTION INTRAMUSCULAR; SUBCUTANEOUS EVERY 5 MIN PRN
Status: CANCELLED | OUTPATIENT
Start: 2023-11-10

## 2023-11-01 LAB — TESTOST SERPL-MCNC: 629 NG/DL (ref 240–950)

## 2023-11-02 ENCOUNTER — MYC MEDICAL ADVICE (OUTPATIENT)
Dept: ENDOCRINOLOGY | Facility: CLINIC | Age: 53
End: 2023-11-02
Payer: COMMERCIAL

## 2023-11-02 DIAGNOSIS — R79.89 LOW TESTOSTERONE IN MALE: Primary | ICD-10-CM

## 2023-11-09 DIAGNOSIS — C90.00 MULTIPLE MYELOMA NOT HAVING ACHIEVED REMISSION (H): Primary | ICD-10-CM

## 2023-11-09 RX ORDER — LENALIDOMIDE 10 MG/1
10 CAPSULE ORAL DAILY
Qty: 28 CAPSULE | Refills: 0 | Status: SHIPPED | OUTPATIENT
Start: 2023-11-09 | End: 2023-12-12

## 2023-11-10 ENCOUNTER — TELEPHONE (OUTPATIENT)
Dept: ONCOLOGY | Facility: CLINIC | Age: 53
End: 2023-11-10

## 2023-11-10 ENCOUNTER — ANESTHESIA EVENT (OUTPATIENT)
Dept: SURGERY | Facility: AMBULATORY SURGERY CENTER | Age: 53
End: 2023-11-10
Payer: COMMERCIAL

## 2023-11-10 ENCOUNTER — ANCILLARY PROCEDURE (OUTPATIENT)
Dept: BONE DENSITY | Facility: CLINIC | Age: 53
End: 2023-11-10
Attending: INTERNAL MEDICINE
Payer: COMMERCIAL

## 2023-11-10 DIAGNOSIS — C90.00 MULTIPLE MYELOMA NOT HAVING ACHIEVED REMISSION (H): ICD-10-CM

## 2023-11-10 PROCEDURE — 77080 DXA BONE DENSITY AXIAL: CPT

## 2023-11-10 NOTE — TELEPHONE ENCOUNTER
Oral Chemotherapy Monitoring Program   Medication: Revlimid  Rx: 10mg PO daily on days 1 through 28 of 28 day cycle   Auth #: [id number]   Risk Category: Adult Male  Routine survey questions reviewed.   Rx to be Escribed to [facility]    Yu Goldberg  [facility]  Oncology Pharmacy Liaison   [email or web address]  [phone] (phone)  [phone] (fax)

## 2023-11-13 NOTE — PROGRESS NOTES
[facility]  Date of visit: 11/15/2023        Oncologic Hx:   - 4/30/2021 M spike of 34.8 in urine.M spike in blood 0.2; IgG 702 with depressed IgA and IgM. Beta 2 microglobulin 2.7. Lambda  with K/L ratio of 0.01. WBC 11.1 with absolute eos of 1.0. hgb, plt, Cr (1.1), and albumin WNL.    -4/30/2021 CT abd/pelvis showed sclerotic marrow changes with numerous lytic appearing lesions throughout the imaged portions of the spine, pelvis and ribs.      -PET scan 5/11/2021 Numerous osteolytic lesions which predominantly demonstrate uptake below liver background levels. There is one intraosseous lesion in the left lateral 9th rib that demonstrates uptake above background, possibly due to nondisplaced fracture. Adjacent to this lesion is mild  hypermetabolism to the left pleura, with new small left pleural effusion, suspicious for malignant effusion versus posttraumatic effusion. Pleural uptake may represent extramedullary myeloma extending along the intercostal space versus inflammation.    - BM bx 5/17/2021 with flow showing 4.0% plasma cells which express CD19 (dim), CD38 (bright), CD45 (dim), , and monotypic cytoplasmic lambda immunoglobulin light chains but lack CD20 and CD56.  Hypercellular bone marrow for age (approximately 75% cellularity) with adequate trilineage hematopoiesis. Plasma cell infiltrate: Interstitial, lambda monotypic and representing approximately 60% the bone marrow cellularity, by  immunohistochemical stain. Cytpgenetics with 2 related hypodiploid clones. One with loss of Y, monosomy 13 and 14 (5%) and one with translocation of 8p and 14, derivative 16 with long arm replaced by extra copy 1q,monosomy 21. FISH showed gain of 1q, loss of 13 and 14, IGH-MYC fusion t(8:14)    - Started Miracle-RVd 21 day with velcade on days 1,8,15.     -Cycle 2 Miracle-RVd. Dose reduce dex to 80 mg d/t fatigue and stomach upset on dex days. Also having cough with basilar  streaking on CXR    - 8/31/2021 BM bx -rare suspicious plasma cells in touch imprint. No increase in plasma cells by morph.    -8/31/2021 PET/CT Diffuse osteolytic lesions throughout the axial and appendicular skeleton. Increased sclerosis since prior exam 5/11/2021 without increased metabolism or size.  Hypermetabolic pretracheal lymph node as well as hypermetabolic level 2 lymph nodes within the right neck. These lymph nodes are likely reactive from autoimmune activation via medical therapy. Hypermetabolic subcentimeter nodules within the thyroid gland    - 11/11/2021 Autologous BMT    - 2/21/22 started maintenance with Revlimid 10 mg daily and daratumumab monthly; did Revlimid alone for C1 due to low IgG levels, added daratumumab starting C2    Interval Hx:   June presents to clinic for toxicity check prior to C24D1.  He is overall doing well today.  His energy levels have been good and feeling well.  He is eating and drinking without issue and good appetite.      His loose stools havre resolved and having normal bowel movements. He has some new intermittent numbness in the L hand middle and ring finger.      Denies fevers/chills, SOB/cough, chest pain, bleeding issues,  N/V,  constipation, rashes/sores, night sweats, new pains, new lumps/bumps. edema, mouth sores..    Physical Exam:  BP (!) 144/87   Pulse 74   Temp 97.6  F (36.4  C)   Resp 16   Wt 67.2 kg (148 lb 1.6 oz)   SpO2 99%   BMI 24.98 kg/m    General: No acute distress  HEENT: Sclera anicteric. Oral exam deferred  Lymph: No lymphadenopathy in neck  Heart: Regular, rate, and rhythm  Lungs: Clear to ascultation bilaterally  Extremities: no lower extremity edema  Neuro: Cranial nerves grossly intact  Rash: none on exposed skin    Most Recent 3 CBC's:  Recent Labs   Lab Test 11/15/23  0940 10/19/23  1401 09/11/23  1332   WBC 3.9* 4.9 5.7   HGB 12.2* 13.4 12.4*   MCV 92 92 90    395 333   ANEUTAUTO 1.7 2.1 2.7     Most Recent 3  BMP's:  Recent Labs   Lab Test 10/19/23  1401 09/11/23  1332 08/08/23  1527    137 139   POTASSIUM 4.5 3.6 4.1   CHLORIDE 103 103 108*   CO2 24 23 21*   BUN 10.6 14.8 7.1   CR 1.24* 1.31* 1.15   ANIONGAP 10 11 10   HARDEEP 9.1 9.1 9.0   GLC 95 137* 103*   PROTTOTAL 6.9 6.5 6.3*   ALBUMIN 4.4 4.6 4.4    Most Recent 3 LFT's:  Recent Labs   Lab Test 10/19/23  1401 09/11/23  1332 08/08/23  1527   AST 34 26 36   ALT 19 14 21   ALKPHOS 66 51 61   BILITOTAL 0.3 0.4 0.4    Most Recent 2 TSH and T4:  Recent Labs   Lab Test 06/26/23  1442 12/19/22  1103 06/17/22  1332 11/14/21  1103 10/20/21  1145   TSH 0.58 0.93 0.07*   < > 0.70   T4  --   --  1.36  --  0.97    < > = values in this interval not displayed.      Latest Reference Range & Units 10/19/23 14:01   Albumin Fraction 3.7 - 5.1 g/dL 4.1   Alpha 1 Fraction 0.2 - 0.4 g/dL 0.4   Alpha 2 Fraction 0.5 - 0.9 g/dL 0.8   Beta Fraction 0.6 - 1.0 g/dL 0.8   ELP Interpretation:  No monoclonal protein seen by capillary electrophoresis, however a faint monoclonal protein band was seen in this sample by immunofixation which is a more sensitive method for monoclonal detection. Hypogammaglobulinemia. Pathologic significance requires clinical correlation. BRIA Dailey M.D., Ph.D., Pathologist.   Gamma Fraction 0.7 - 1.6 g/dL 0.6 (L)   IGA 84 - 499 mg/dL 90    - 1,616 mg/dL 549 (L)   IGM 35 - 242 mg/dL 43   Immunofixation ELP  Faint monoclonal IgG immunoglobulin of kappa light chain type. Monoclonal antibody therapeutics (e.g. Daratumumab) may appear as monoclonal proteins on serum electrophoresis and immunofixation. Results should be interpreted with caution. Pathologic significance requires clinical correlation. BRIA Dailey M.D., Ph.D., Pathologist   Kappa Free Lt Chain 0.33 - 1.94 mg/dL 1.37   Kappa Lambda Ratio 0.26 - 1.65  1.19   Lambda Free Lt Chain 0.57 - 2.63 mg/dL 1.15   Monoclonal Peak <=0.0 g/dL 0.0   Total Protein Serum for ELP 6.4 - 8.3 g/dL 6.7   (L): Data is  abnormally low      I reviewed the above labs today.      Assessment and Plan  Logan has multiple myeloma with high risk cytogenetics. He got Vidya-RVD with VGPR, then underwent autologous transplant 11/11/21. Continue acyclovir daily for viral ppx and bactrim M/T for PJP ppx. Given his high risk cytogenetics he started vidya + revlimid maintenance therapy 2/21/22. He had severe flu-like effects from his first dose of Zometa but he has been tolerating monthly Zometa dosed at 3 mg. Continue Vit D.   - Continue vidya + revlimid today, labs stable. Continue zometa  - Get clonoseq on upcoming BMBx and consider de-escalating from dual maintenance if negative  - Revlimid held for 1 week (10/20-10/27) due to parainfluenza and coronavirus  - Proceed with C24D1 today (11/15)- he is tolerating treatment well without toxicities/side-effects    Covid 10/19- resolved  Parainfluenza 10/19- resolved  Patient reports ongoing cough over the past months and being on antibiotics x5 days with no improvement.  He had a chest xray 10/13 that was unremarkable.  He denies fevers or SOB, low concern for pneumonia.  - 10/19 RVP positive for coronavirus and parainfluenza.  Script sent for tessalon pearls and albuterol inhaler and had patient hold Revlimid 1 week.  - Symptoms have resolved    Elevated serum creatinine- resolved  11/15 Crt 1.10     Hypogammaglobulinemia   Start monthly IV Ig whenever IgG <300  - 10/19 IgG 549  - 11/15 IgG pending    Normocytic Anemia:   Likely d/t revlimid and daratumumab. Will keep monitoring and dose reduce if needed.      Elevated BP:    Previously discussed his elevated BP in clinic and recommended following with his PCP.  He has been taking his BP at home and they are within a normal range.    24 minutes spent on the date of the encounter doing chart review, review of test results, interpretation of tests, patient visit, and documentation       PATTY Jamil CNP

## 2023-11-15 ENCOUNTER — APPOINTMENT (OUTPATIENT)
Dept: LAB | Facility: CLINIC | Age: 53
End: 2023-11-15
Attending: STUDENT IN AN ORGANIZED HEALTH CARE EDUCATION/TRAINING PROGRAM
Payer: COMMERCIAL

## 2023-11-15 ENCOUNTER — INFUSION THERAPY VISIT (OUTPATIENT)
Dept: ONCOLOGY | Facility: CLINIC | Age: 53
End: 2023-11-15
Attending: STUDENT IN AN ORGANIZED HEALTH CARE EDUCATION/TRAINING PROGRAM
Payer: COMMERCIAL

## 2023-11-15 ENCOUNTER — ONCOLOGY VISIT (OUTPATIENT)
Dept: ONCOLOGY | Facility: CLINIC | Age: 53
End: 2023-11-15
Payer: COMMERCIAL

## 2023-11-15 ENCOUNTER — HOSPITAL ENCOUNTER (OUTPATIENT)
Dept: PET IMAGING | Facility: CLINIC | Age: 53
Discharge: HOME | End: 2023-11-15
Attending: STUDENT IN AN ORGANIZED HEALTH CARE EDUCATION/TRAINING PROGRAM
Payer: COMMERCIAL

## 2023-11-15 VITALS
HEART RATE: 74 BPM | TEMPERATURE: 97.6 F | OXYGEN SATURATION: 99 % | RESPIRATION RATE: 16 BRPM | DIASTOLIC BLOOD PRESSURE: 87 MMHG | BODY MASS INDEX: 24.98 KG/M2 | SYSTOLIC BLOOD PRESSURE: 144 MMHG | WEIGHT: 148.1 LBS

## 2023-11-15 DIAGNOSIS — R79.89 ELEVATED SERUM CREATININE: ICD-10-CM

## 2023-11-15 DIAGNOSIS — C90.01 MULTIPLE MYELOMA IN REMISSION (H): Primary | ICD-10-CM

## 2023-11-15 DIAGNOSIS — R03.0 ELEVATED BLOOD PRESSURE READING WITHOUT DIAGNOSIS OF HYPERTENSION: ICD-10-CM

## 2023-11-15 DIAGNOSIS — C90.00 MULTIPLE MYELOMA NOT HAVING ACHIEVED REMISSION (H): ICD-10-CM

## 2023-11-15 DIAGNOSIS — C90.00 MULTIPLE MYELOMA NOT HAVING ACHIEVED REMISSION (H): Primary | ICD-10-CM

## 2023-11-15 LAB
ALBUMIN SERPL BCG-MCNC: 4.2 G/DL (ref 3.5–5.2)
ALP SERPL-CCNC: 54 U/L (ref 40–150)
ALT SERPL W P-5'-P-CCNC: 19 U/L (ref 0–70)
ANION GAP SERPL CALCULATED.3IONS-SCNC: 9 MMOL/L (ref 7–15)
AST SERPL W P-5'-P-CCNC: 32 U/L (ref 0–45)
BASOPHILS # BLD AUTO: 0 10E3/UL (ref 0–0.2)
BASOPHILS NFR BLD AUTO: 1 %
BILIRUB SERPL-MCNC: 0.4 MG/DL
BUN SERPL-MCNC: 10.5 MG/DL (ref 6–20)
CALCIUM SERPL-MCNC: 8.4 MG/DL (ref 8.6–10)
CHLORIDE SERPL-SCNC: 107 MMOL/L (ref 98–107)
CREAT SERPL-MCNC: 1.1 MG/DL (ref 0.67–1.17)
DEPRECATED HCO3 PLAS-SCNC: 23 MMOL/L (ref 22–29)
EGFRCR SERPLBLD CKD-EPI 2021: 79 ML/MIN/1.73M2
EOSINOPHIL # BLD AUTO: 0.3 10E3/UL (ref 0–0.7)
EOSINOPHIL NFR BLD AUTO: 7 %
ERYTHROCYTE [DISTWIDTH] IN BLOOD BY AUTOMATED COUNT: 15.9 % (ref 10–15)
GLUCOSE SERPL-MCNC: 101 MG/DL (ref 70–99)
HCT VFR BLD AUTO: 37.9 % (ref 40–53)
HGB BLD-MCNC: 12.2 G/DL (ref 13.3–17.7)
IGA SERPL-MCNC: 68 MG/DL (ref 84–499)
IGG SERPL-MCNC: 410 MG/DL (ref 610–1616)
IGM SERPL-MCNC: 25 MG/DL (ref 35–242)
IMM GRANULOCYTES # BLD: 0 10E3/UL
IMM GRANULOCYTES NFR BLD: 0 %
KAPPA LC FREE SER-MCNC: 1.08 MG/DL (ref 0.33–1.94)
KAPPA LC FREE/LAMBDA FREE SER NEPH: 1.3 {RATIO} (ref 0.26–1.65)
LAMBDA LC FREE SERPL-MCNC: 0.83 MG/DL (ref 0.57–2.63)
LYMPHOCYTES # BLD AUTO: 1.3 10E3/UL (ref 0.8–5.3)
LYMPHOCYTES NFR BLD AUTO: 33 %
MCH RBC QN AUTO: 29.8 PG (ref 26.5–33)
MCHC RBC AUTO-ENTMCNC: 32.2 G/DL (ref 31.5–36.5)
MCV RBC AUTO: 92 FL (ref 78–100)
MONOCYTES # BLD AUTO: 0.7 10E3/UL (ref 0–1.3)
MONOCYTES NFR BLD AUTO: 17 %
NEUTROPHILS # BLD AUTO: 1.7 10E3/UL (ref 1.6–8.3)
NEUTROPHILS NFR BLD AUTO: 42 %
NRBC # BLD AUTO: 0 10E3/UL
NRBC BLD AUTO-RTO: 0 /100
PLATELET # BLD AUTO: 231 10E3/UL (ref 150–450)
POTASSIUM SERPL-SCNC: 3.8 MMOL/L (ref 3.4–5.3)
PROT SERPL-MCNC: 6.2 G/DL (ref 6.4–8.3)
RBC # BLD AUTO: 4.1 10E6/UL (ref 4.4–5.9)
SODIUM SERPL-SCNC: 139 MMOL/L (ref 135–145)
TOTAL PROTEIN SERUM FOR ELP: 5.8 G/DL (ref 6.4–8.3)
WBC # BLD AUTO: 3.9 10E3/UL (ref 4–11)

## 2023-11-15 PROCEDURE — 96401 CHEMO ANTI-NEOPL SQ/IM: CPT

## 2023-11-15 PROCEDURE — 96365 THER/PROPH/DIAG IV INF INIT: CPT | Mod: 59

## 2023-11-15 PROCEDURE — 250N000011 HC RX IP 250 OP 636: Mod: JZ | Performed by: STUDENT IN AN ORGANIZED HEALTH CARE EDUCATION/TRAINING PROGRAM

## 2023-11-15 PROCEDURE — 80053 COMPREHEN METABOLIC PANEL: CPT

## 2023-11-15 PROCEDURE — G0463 HOSPITAL OUTPT CLINIC VISIT: HCPCS | Mod: 25

## 2023-11-15 PROCEDURE — 36415 COLL VENOUS BLD VENIPUNCTURE: CPT

## 2023-11-15 PROCEDURE — 83521 IG LIGHT CHAINS FREE EACH: CPT

## 2023-11-15 PROCEDURE — 86334 IMMUNOFIX E-PHORESIS SERUM: CPT | Performed by: PATHOLOGY

## 2023-11-15 PROCEDURE — 343N000001 HC RX 343: Performed by: STUDENT IN AN ORGANIZED HEALTH CARE EDUCATION/TRAINING PROGRAM

## 2023-11-15 PROCEDURE — 84165 PROTEIN E-PHORESIS SERUM: CPT | Mod: 26 | Performed by: PATHOLOGY

## 2023-11-15 PROCEDURE — 78816 PET IMAGE W/CT FULL BODY: CPT | Mod: 26 | Performed by: RADIOLOGY

## 2023-11-15 PROCEDURE — 78816 PET IMAGE W/CT FULL BODY: CPT | Mod: PS

## 2023-11-15 PROCEDURE — 84165 PROTEIN E-PHORESIS SERUM: CPT | Mod: TC | Performed by: PATHOLOGY

## 2023-11-15 PROCEDURE — 85004 AUTOMATED DIFF WBC COUNT: CPT

## 2023-11-15 PROCEDURE — 82784 ASSAY IGA/IGD/IGG/IGM EACH: CPT

## 2023-11-15 PROCEDURE — 84155 ASSAY OF PROTEIN SERUM: CPT

## 2023-11-15 PROCEDURE — 258N000003 HC RX IP 258 OP 636: Performed by: STUDENT IN AN ORGANIZED HEALTH CARE EDUCATION/TRAINING PROGRAM

## 2023-11-15 PROCEDURE — 99214 OFFICE O/P EST MOD 30 MIN: CPT

## 2023-11-15 PROCEDURE — A9552 F18 FDG: HCPCS | Performed by: STUDENT IN AN ORGANIZED HEALTH CARE EDUCATION/TRAINING PROGRAM

## 2023-11-15 PROCEDURE — 86334 IMMUNOFIX E-PHORESIS SERUM: CPT | Mod: 26 | Performed by: PATHOLOGY

## 2023-11-15 RX ORDER — LIDOCAINE 40 MG/G
CREAM TOPICAL
Status: CANCELLED | OUTPATIENT
Start: 2023-11-15

## 2023-11-15 RX ORDER — LIDOCAINE HYDROCHLORIDE 10 MG/ML
8-10 INJECTION, SOLUTION EPIDURAL; INFILTRATION; INTRACAUDAL; PERINEURAL
Status: CANCELLED | OUTPATIENT
Start: 2023-11-15

## 2023-11-15 RX ADMIN — SODIUM CHLORIDE 250 ML: 9 INJECTION, SOLUTION INTRAVENOUS at 10:45

## 2023-11-15 RX ADMIN — FLUDEOXYGLUCOSE F-18 10.06 MILLICURIE: 500 INJECTION, SOLUTION INTRAVENOUS at 07:44

## 2023-11-15 RX ADMIN — DARATUMUMAB AND HYALURONIDASE-FIHJ (HUMAN RECOMBINANT) 1800 MG: 1800; 30000 INJECTION SUBCUTANEOUS at 10:34

## 2023-11-15 RX ADMIN — ZOLEDRONIC ACID 3 MG: 4 INJECTION, SOLUTION, CONCENTRATE INTRAVENOUS at 10:45

## 2023-11-15 ASSESSMENT — PAIN SCALES - GENERAL: PAINLEVEL: NO PAIN (0)

## 2023-11-15 NOTE — PROGRESS NOTES
Infusion Nursing Note:  June Ronquillo presents today for cycle 24 day 1 darzalaex faspro and zometa.    Patient seen by provider today: Yes: Ana Cristina Cleveland CNP   present during visit today: Not Applicable.    Note: Pt presents today generally feeling well. He was seen in clinic today by Ana Cristina Cleveland CNP. Pt wishes to proceed with planned treatment.    Intravenous Access:  Peripheral IV placed.    Treatment Conditions:  Lab Results   Component Value Date    HGB 12.2 (L) 11/15/2023    WBC 3.9 (L) 11/15/2023    ANEU 4.7 03/13/2023    ANEUTAUTO 1.7 11/15/2023     11/15/2023        Lab Results   Component Value Date     11/15/2023    POTASSIUM 3.8 11/15/2023    MAG 2.6 (H) 04/28/2023    CR 1.10 11/15/2023    HARDEEP 8.4 (L) 11/15/2023    BILITOTAL 0.4 11/15/2023    ALBUMIN 4.2 11/15/2023    ALT 19 11/15/2023    AST 32 11/15/2023   Results reviewed, labs MET treatment parameters, ok to proceed with treatment.    Post Infusion Assessment:  Patient tolerated infusion without incident.  Patient tolerated LEFT lower quadrant injection without incident.  Blood return noted pre and post infusion.  Site patent and intact, free from redness, edema or discomfort.  No evidence of extravasations.  Access discontinued per protocol.     Discharge Plan:   Patient declined prescription refills.  Discharge instructions reviewed with: Patient.  Patient and/or family verbalized understanding of discharge instructions and all questions answered.  AVS to patient via [employer].  Patient will return 12/15/23 for next appointment.   Patient discharged in stable condition accompanied by: self.  Departure Mode: Ambulatory.      Emily Meese, RN

## 2023-11-15 NOTE — NURSING NOTE
"Oncology Rooming Note    November 15, 2023 9:51 AM   June Ronquillo is a 53 year old male who presents for:    Chief Complaint   Patient presents with    Blood Draw     Labs drawn via piv by rn in lab. VS taken.    Oncology Clinic Visit     Multiple Myeloma     Initial Vitals: BP (!) 144/87   Pulse 74   Temp 97.6  F (36.4  C)   Resp 16   Wt 67.2 kg (148 lb 1.6 oz)   SpO2 99%   BMI 24.98 kg/m   Estimated body mass index is 24.98 kg/m  as calculated from the following:    Height as of 10/12/23: 1.64 m (5' 4.57\").    Weight as of this encounter: 67.2 kg (148 lb 1.6 oz). Body surface area is 1.75 meters squared.  No Pain (0) Comment: Data Unavailable   No LMP for male patient.  Allergies reviewed: Yes  Medications reviewed: Yes    Medications: Medication refills not needed today.  Pharmacy name entered into [facility]:    [facility] [place], [place] - [address]  [facility] - [place], [place] - [address] [facility] [place], [place] - [phone]  [facility] [place], [place] - [address] [place] [address]    Clinical concerns: Imaging Review       Macarena Daley LPN  11/15/2023                "

## 2023-11-15 NOTE — PATIENT INSTRUCTIONS
[facility] Triage and after hours / weekends / holidays:  [phone]    Please call the triage or after hours line if you experience a temperature greater than or equal to 100.4, shaking chills, have uncontrolled nausea, vomiting and/or diarrhea, dizziness, shortness of breath, chest pain, bleeding, unexplained bruising, or if you have any other new/concerning symptoms, questions or concerns.      If you are having any concerning symptoms or wish to speak to a provider before your next infusion visit, please call triage to notify them so we can adequately serve you.     If you need a refill on a narcotic prescription or other medication, please call before your infusion appointment.                November 2023 Sunday Monday Tuesday Wednesday Thursday Friday Saturday                  1     2     3     4       5     6     7     8     9     10    DX HIP/PELVIS/SPINE   2:15 PM   (30 min.)   [id number]   [facility] [place] 11       12     13     14     15    PET ONCOLOGY WHOLE BODY   7:00 AM   (30 min.)   [id number]   [facility]    LAB PERIPHERAL   8:45 AM   (15 min.)   [facility]   [facility]    RETURN CCSL   9:15 AM   (45 min.)   Ana Cristina Cleveland APRN CNP   [facility]    ONC INFUSION 1 HR (60 MIN)  10:00 AM   (60 min.)    ONC INFUSION NURSE   [facility] 16    LAB PERIPHERAL   9:00 AM   (15 min.)   [facility]   [facility]    BMT RETURN   9:30 AM   (30 min.)    BMT DOROTA #2   [facility] and [facility] Transplant [facility] BMT BONE MARROW BIOPSY  12:00 PM   (90 min.)    ASC BONE MARROW BIOPSY DOROTA   [facility] Blood and Marrow Transplant [facility]    BIOPSY, BONE MARROW  12:00 PM   Carrier, Sneha ARGUELLES PA-C   [facility] OR    Outpatient Visit  12:00 PM   [place] [id number]      18       19     20     21     22     23     24    BMT ANNIVERSARY VISIT  12:00 PM   (30 min.)    BMT AUTO CELL THERAPY   [facility]row Transplant [facility] [place] 25       26     27     28     29     30                           December 2023 Sunday Monday Tuesday Wednesday Thursday Friday Saturday                            1     2       3     4     5     6     7     8     9       10     11     12     13     14     15    LAB PERIPHERAL  12:30 PM   (15 min.)    [facility]   [facility]    ONC INFUSION 1.5 HR (90 MIN)   1:00 PM   (90 min.)    ONCOLOGY INFUSION CHAIR   [facility] [place] 16       17     18     19     20     21     22     23       24     25     26     27     28     29     30       31                                                   Lab Results:  Recent Results (from the past 12 hour(s))   Comprehensive metabolic panel    Collection Time: 11/15/23  9:40 AM   Result Value Ref Range    Sodium 139 135 - 145 mmol/L    Potassium 3.8 3.4 - 5.3 mmol/L    Carbon Dioxide (CO2) 23 22 - 29 mmol/L    Anion Gap 9 7 - 15 mmol/L    Urea Nitrogen 10.5 6.0 - 20.0 mg/dL    Creatinine 1.10 0.67 - 1.17 mg/dL    GFR Estimate 79 >60 mL/min/1.73m2    Calcium 8.4 (L) 8.6 - 10.0 mg/dL    Chloride 107 98 - 107 mmol/L    Glucose 101 (H) 70 - 99 mg/dL    Alkaline Phosphatase 54 40 - 150 U/L    AST 32 0 - 45 U/L    ALT 19 0 - 70 U/L    Protein Total 6.2 (L) 6.4 - 8.3 g/dL    Albumin 4.2 3.5 - 5.2 g/dL    Bilirubin Total 0.4 <=1.2 mg/dL   CBC with platelets and differential    Collection Time: 11/15/23  9:40 AM   Result Value Ref Range    WBC Count 3.9 (L) 4.0 - 11.0 10e3/uL    RBC Count 4.10 (L) 4.40 - 5.90 10e6/uL    Hemoglobin 12.2 (L) 13.3 - 17.7 g/dL    Hematocrit 37.9 (L) 40.0 - 53.0 %    MCV 92 78 - 100 fL    MCH 29.8 26.5 - 33.0 pg    MCHC 32.2 31.5 - 36.5 g/dL    RDW 15.9 (H) 10.0 - 15.0 %    Platelet Count 231 150 -  450 10e3/uL    % Neutrophils 42 %    % Lymphocytes 33 %    % Monocytes 17 %    % Eosinophils 7 %    % Basophils 1 %    % Immature Granulocytes 0 %    NRBCs per 100 WBC 0 <1 /100    Absolute Neutrophils 1.7 1.6 - 8.3 10e3/uL    Absolute Lymphocytes 1.3 0.8 - 5.3 10e3/uL    Absolute Monocytes 0.7 0.0 - 1.3 10e3/uL    Absolute Eosinophils 0.3 0.0 - 0.7 10e3/uL    Absolute Basophils 0.0 0.0 - 0.2 10e3/uL    Absolute Immature Granulocytes 0.0 <=0.4 10e3/uL    Absolute NRBCs 0.0 10e3/uL

## 2023-11-15 NOTE — LETTER
11/15/2023         RE: June Ronquillo  [address]  [facility] [address]        Dear Colleague,    Thank you for referring your patient, June Ronquillo, to the [facility]. Please see a copy of my visit note below.    [facility]  Date of visit: 11/15/2023        Oncologic Hx:   - 4/30/2021 M spike of 34.8 in urine.M spike in blood 0.2; IgG 702 with depressed IgA and IgM. Beta 2 microglobulin 2.7. Lambda  with K/L ratio of 0.01. WBC 11.1 with absolute eos of 1.0. hgb, plt, Cr (1.1), and albumin WNL.    -4/30/2021 CT abd/pelvis showed sclerotic marrow changes with numerous lytic appearing lesions throughout the imaged portions of the spine, pelvis and ribs.      -PET scan 5/11/2021 Numerous osteolytic lesions which predominantly demonstrate uptake below liver background levels. There is one intraosseous lesion in the left lateral 9th rib that demonstrates uptake above background, possibly due to nondisplaced fracture. Adjacent to this lesion is mild  hypermetabolism to the left pleura, with new small left pleural effusion, suspicious for malignant effusion versus posttraumatic effusion. Pleural uptake may represent extramedullary myeloma extending along the intercostal space versus inflammation.    - BM bx 5/17/2021 with flow showing 4.0% plasma cells which express CD19 (dim), CD38 (bright), CD45 (dim), , and monotypic cytoplasmic lambda immunoglobulin light chains but lack CD20 and CD56.  Hypercellular bone marrow for age (approximately 75% cellularity) with adequate trilineage hematopoiesis. Plasma cell infiltrate: Interstitial, lambda monotypic and representing approximately 60% the bone marrow cellularity, by  immunohistochemical stain. Cytpgenetics with 2 related hypodiploid clones. One with loss of Y, monosomy 13 and 14 (5%) and one with translocation of 8p and 14, derivative 16 with long arm replaced by extra  copy 1q,monosomy 21. FISH showed gain of 1q, loss of 13 and 14, IGH-MYC fusion t(8:14)    - Started Miracle-RVd 21 day with velcade on days 1,8,15.     -Cycle 2 Miracle-RVd. Dose reduce dex to 80 mg d/t fatigue and stomach upset on dex days. Also having cough with basilar streaking on CXR    - 8/31/2021 BM bx -rare suspicious plasma cells in touch imprint. No increase in plasma cells by morph.    -8/31/2021 PET/CT Diffuse osteolytic lesions throughout the axial and appendicular skeleton. Increased sclerosis since prior exam 5/11/2021 without increased metabolism or size.  Hypermetabolic pretracheal lymph node as well as hypermetabolic level 2 lymph nodes within the right neck. These lymph nodes are likely reactive from autoimmune activation via medical therapy. Hypermetabolic subcentimeter nodules within the thyroid gland    - 11/11/2021 Autologous BMT    - 2/21/22 started maintenance with Revlimid 10 mg daily and daratumumab monthly; did Revlimid alone for C1 due to low IgG levels, added daratumumab starting C2    Interval Hx:   June presents to clinic for toxicity check prior to C24D1.  He is overall doing well today.  His energy levels have been good and feeling well.  He is eating and drinking without issue and good appetite.      His loose stools havre resolved and having normal bowel movements. He has some new intermittent numbness in the L hand middle and ring finger.      Denies fevers/chills, SOB/cough, chest pain, bleeding issues,  N/V,  constipation, rashes/sores, night sweats, new pains, new lumps/bumps. edema, mouth sores..    Physical Exam:  BP (!) 144/87   Pulse 74   Temp 97.6  F (36.4  C)   Resp 16   Wt 67.2 kg (148 lb 1.6 oz)   SpO2 99%   BMI 24.98 kg/m    General: No acute distress  HEENT: Sclera anicteric. Oral exam deferred  Lymph: No lymphadenopathy in neck  Heart: Regular, rate, and rhythm  Lungs: Clear to ascultation bilaterally  Extremities: no lower extremity edema  Neuro: Cranial  nerves grossly intact  Rash: none on exposed skin    Most Recent 3 CBC's:  Recent Labs   Lab Test 11/15/23  0940 10/19/23  1401 09/11/23  1332   WBC 3.9* 4.9 5.7   HGB 12.2* 13.4 12.4*   MCV 92 92 90    395 333   ANEUTAUTO 1.7 2.1 2.7     Most Recent 3 BMP's:  Recent Labs   Lab Test 10/19/23  1401 09/11/23  1332 08/08/23  1527    137 139   POTASSIUM 4.5 3.6 4.1   CHLORIDE 103 103 108*   CO2 24 23 21*   BUN 10.6 14.8 7.1   CR 1.24* 1.31* 1.15   ANIONGAP 10 11 10   HARDEEP 9.1 9.1 9.0   GLC 95 137* 103*   PROTTOTAL 6.9 6.5 6.3*   ALBUMIN 4.4 4.6 4.4    Most Recent 3 LFT's:  Recent Labs   Lab Test 10/19/23  1401 09/11/23  1332 08/08/23  1527   AST 34 26 36   ALT 19 14 21   ALKPHOS 66 51 61   BILITOTAL 0.3 0.4 0.4    Most Recent 2 TSH and T4:  Recent Labs   Lab Test 06/26/23  1442 12/19/22  1103 06/17/22  1332 11/14/21  1103 10/20/21  1145   TSH 0.58 0.93 0.07*   < > 0.70   T4  --   --  1.36  --  0.97    < > = values in this interval not displayed.      Latest Reference Range & Units 10/19/23 14:01   Albumin Fraction 3.7 - 5.1 g/dL 4.1   Alpha 1 Fraction 0.2 - 0.4 g/dL 0.4   Alpha 2 Fraction 0.5 - 0.9 g/dL 0.8   Beta Fraction 0.6 - 1.0 g/dL 0.8   ELP Interpretation:  No monoclonal protein seen by capillary electrophoresis, however a faint monoclonal protein band was seen in this sample by immunofixation which is a more sensitive method for monoclonal detection. Hypogammaglobulinemia. Pathologic significance requires clinical correlation. BRIA Dailey M.D., Ph.D., Pathologist.   Gamma Fraction 0.7 - 1.6 g/dL 0.6 (L)   IGA 84 - 499 mg/dL 90    - 1,616 mg/dL 549 (L)   IGM 35 - 242 mg/dL 43   Immunofixation ELP  Faint monoclonal IgG immunoglobulin of kappa light chain type. Monoclonal antibody therapeutics (e.g. Daratumumab) may appear as monoclonal proteins on serum electrophoresis and immunofixation. Results should be interpreted with caution. Pathologic significance requires clinical correlation. A.ARenée  RAMYA Dailey, Ph.D., Pathologist   Kappa Free Lt Chain 0.33 - 1.94 mg/dL 1.37   Kappa Lambda Ratio 0.26 - 1.65  1.19   Lambda Free Lt Chain 0.57 - 2.63 mg/dL 1.15   Monoclonal Peak <=0.0 g/dL 0.0   Total Protein Serum for ELP 6.4 - 8.3 g/dL 6.7   (L): Data is abnormally low      I reviewed the above labs today.      Assessment and Plan  Logan has multiple myeloma with high risk cytogenetics. He got Vidya-RVD with VGPR, then underwent autologous transplant 11/11/21. Continue acyclovir daily for viral ppx and bactrim M/T for PJP ppx. Given his high risk cytogenetics he started vidya + revlimid maintenance therapy 2/21/22. He had severe flu-like effects from his first dose of Zometa but he has been tolerating monthly Zometa dosed at 3 mg. Continue Vit D.   - Continue vidya + revlimid today, labs stable. Continue zometa  - Get clonoseq on upcoming BMBx and consider de-escalating from dual maintenance if negative  - Revlimid held for 1 week (10/20-10/27) due to parainfluenza and coronavirus  - Proceed with C24D1 today (11/15)- he is tolerating treatment well without toxicities/side-effects    Covid 10/19- resolved  Parainfluenza 10/19- resolved  Patient reports ongoing cough over the past months and being on antibiotics x5 days with no improvement.  He had a chest xray 10/13 that was unremarkable.  He denies fevers or SOB, low concern for pneumonia.  - 10/19 RVP positive for coronavirus and parainfluenza.  Script sent for tessalon pearls and albuterol inhaler and had patient hold Revlimid 1 week.  - Symptoms have resolved    Elevated serum creatinine- resolved  11/15 Crt 1.10     Hypogammaglobulinemia   Start monthly IV Ig whenever IgG <300  - 10/19 IgG 549  - 11/15 IgG pending    Normocytic Anemia:   Likely d/t revlimid and daratumumab. Will keep monitoring and dose reduce if needed.      Elevated BP:    Previously discussed his elevated BP in clinic and recommended following with his PCP.  He has been taking his BP at home  and they are within a normal range.    24 minutes spent on the date of the encounter doing chart review, review of test results, interpretation of tests, patient visit, and documentation       PATTY Jamil CNP

## 2023-11-15 NOTE — NURSING NOTE
Chief Complaint   Patient presents with    Blood Draw     Labs drawn via piv by rn in lab. VS taken.     Labs drawn from PIV previously placed by RN. Line flushed with saline. Vitals taken. Pt checked in for appointment(s).    Skinny Jensen RN

## 2023-11-16 ENCOUNTER — ANESTHESIA (OUTPATIENT)
Dept: SURGERY | Facility: AMBULATORY SURGERY CENTER | Age: 53
End: 2023-11-16
Payer: COMMERCIAL

## 2023-11-16 ENCOUNTER — ONCOLOGY VISIT (OUTPATIENT)
Dept: TRANSPLANT | Facility: CLINIC | Age: 53
End: 2023-11-16
Attending: PHYSICIAN ASSISTANT
Payer: COMMERCIAL

## 2023-11-16 ENCOUNTER — HOSPITAL ENCOUNTER (OUTPATIENT)
Facility: AMBULATORY SURGERY CENTER | Age: 53
Discharge: HOME | End: 2023-11-16
Attending: PHYSICIAN ASSISTANT
Payer: COMMERCIAL

## 2023-11-16 ENCOUNTER — APPOINTMENT (OUTPATIENT)
Dept: LAB | Facility: CLINIC | Age: 53
End: 2023-11-16
Attending: PHYSICIAN ASSISTANT
Payer: COMMERCIAL

## 2023-11-16 ENCOUNTER — OFFICE VISIT (OUTPATIENT)
Dept: TRANSPLANT | Facility: CLINIC | Age: 53
End: 2023-11-16
Attending: STUDENT IN AN ORGANIZED HEALTH CARE EDUCATION/TRAINING PROGRAM
Payer: COMMERCIAL

## 2023-11-16 VITALS
HEART RATE: 70 BPM | TEMPERATURE: 97 F | SYSTOLIC BLOOD PRESSURE: 128 MMHG | BODY MASS INDEX: 24.66 KG/M2 | WEIGHT: 148 LBS | RESPIRATION RATE: 16 BRPM | OXYGEN SATURATION: 100 % | DIASTOLIC BLOOD PRESSURE: 82 MMHG | HEIGHT: 65 IN

## 2023-11-16 VITALS
BODY MASS INDEX: 25.03 KG/M2 | SYSTOLIC BLOOD PRESSURE: 119 MMHG | TEMPERATURE: 97.7 F | OXYGEN SATURATION: 100 % | DIASTOLIC BLOOD PRESSURE: 78 MMHG | RESPIRATION RATE: 16 BRPM | WEIGHT: 148.4 LBS | HEART RATE: 67 BPM

## 2023-11-16 VITALS — HEART RATE: 76 BPM

## 2023-11-16 DIAGNOSIS — C90.00 MULTIPLE MYELOMA NOT HAVING ACHIEVED REMISSION (H): Primary | ICD-10-CM

## 2023-11-16 DIAGNOSIS — C90.00 MULTIPLE MYELOMA NOT HAVING ACHIEVED REMISSION (H): ICD-10-CM

## 2023-11-16 LAB
ALBUMIN SERPL BCG-MCNC: 4.3 G/DL (ref 3.5–5.2)
ALBUMIN SERPL ELPH-MCNC: 3.8 G/DL (ref 3.7–5.1)
ALP SERPL-CCNC: 57 U/L (ref 40–150)
ALPHA1 GLOB SERPL ELPH-MCNC: 0.3 G/DL (ref 0.2–0.4)
ALPHA2 GLOB SERPL ELPH-MCNC: 0.6 G/DL (ref 0.5–0.9)
ALT SERPL W P-5'-P-CCNC: 20 U/L (ref 0–70)
ANION GAP SERPL CALCULATED.3IONS-SCNC: 8 MMOL/L (ref 7–15)
AST SERPL W P-5'-P-CCNC: 34 U/L (ref 0–45)
B-GLOBULIN SERPL ELPH-MCNC: 0.7 G/DL (ref 0.6–1)
BASOPHILS # BLD AUTO: 0 10E3/UL (ref 0–0.2)
BASOPHILS NFR BLD AUTO: 1 %
BILIRUB SERPL-MCNC: 0.4 MG/DL
BUN SERPL-MCNC: 8.5 MG/DL (ref 6–20)
CALCIUM SERPL-MCNC: 8.4 MG/DL (ref 8.6–10)
CHLORIDE SERPL-SCNC: 106 MMOL/L (ref 98–107)
CREAT SERPL-MCNC: 1.13 MG/DL (ref 0.67–1.17)
DEPRECATED HCO3 PLAS-SCNC: 22 MMOL/L (ref 22–29)
EGFRCR SERPLBLD CKD-EPI 2021: 77 ML/MIN/1.73M2
EOSINOPHIL # BLD AUTO: 0.3 10E3/UL (ref 0–0.7)
EOSINOPHIL NFR BLD AUTO: 8 %
ERYTHROCYTE [DISTWIDTH] IN BLOOD BY AUTOMATED COUNT: 16.2 % (ref 10–15)
GAMMA GLOB SERPL ELPH-MCNC: 0.4 G/DL (ref 0.7–1.6)
GLUCOSE SERPL-MCNC: 96 MG/DL (ref 70–99)
HCT VFR BLD AUTO: 40.2 % (ref 40–53)
HGB BLD-MCNC: 13.1 G/DL (ref 13.3–17.7)
IGA SERPL-MCNC: 72 MG/DL (ref 84–499)
IGG SERPL-MCNC: 438 MG/DL (ref 610–1616)
IGM SERPL-MCNC: 26 MG/DL (ref 35–242)
IMM GRANULOCYTES # BLD: 0 10E3/UL
IMM GRANULOCYTES NFR BLD: 0 %
KAPPA LC FREE SER-MCNC: 1.09 MG/DL (ref 0.33–1.94)
KAPPA LC FREE/LAMBDA FREE SER NEPH: 1.25 {RATIO} (ref 0.26–1.65)
LAMBDA LC FREE SERPL-MCNC: 0.87 MG/DL (ref 0.57–2.63)
LYMPHOCYTES # BLD AUTO: 1.2 10E3/UL (ref 0.8–5.3)
LYMPHOCYTES NFR BLD AUTO: 28 %
M PROTEIN SERPL ELPH-MCNC: 0 G/DL
MCH RBC QN AUTO: 30 PG (ref 26.5–33)
MCHC RBC AUTO-ENTMCNC: 32.6 G/DL (ref 31.5–36.5)
MCV RBC AUTO: 92 FL (ref 78–100)
MONOCYTES # BLD AUTO: 0.7 10E3/UL (ref 0–1.3)
MONOCYTES NFR BLD AUTO: 16 %
NEUTROPHILS # BLD AUTO: 2 10E3/UL (ref 1.6–8.3)
NEUTROPHILS NFR BLD AUTO: 47 %
NRBC # BLD AUTO: 0 10E3/UL
NRBC BLD AUTO-RTO: 0 /100
PLATELET # BLD AUTO: 248 10E3/UL (ref 150–450)
POTASSIUM SERPL-SCNC: 4.1 MMOL/L (ref 3.4–5.3)
PROT PATTERN SERPL ELPH-IMP: ABNORMAL
PROT PATTERN SERPL IFE-IMP: NORMAL
PROT SERPL-MCNC: 6.4 G/DL (ref 6.4–8.3)
RBC # BLD AUTO: 4.37 10E6/UL (ref 4.4–5.9)
SODIUM SERPL-SCNC: 136 MMOL/L (ref 135–145)
TOTAL PROTEIN SERUM FOR ELP: 6 G/DL (ref 6.4–8.3)
WBC # BLD AUTO: 4.2 10E3/UL (ref 4–11)

## 2023-11-16 PROCEDURE — 82784 ASSAY IGA/IGD/IGG/IGM EACH: CPT

## 2023-11-16 PROCEDURE — 86334 IMMUNOFIX E-PHORESIS SERUM: CPT | Performed by: PATHOLOGY

## 2023-11-16 PROCEDURE — 84165 PROTEIN E-PHORESIS SERUM: CPT | Mod: TC | Performed by: PATHOLOGY

## 2023-11-16 PROCEDURE — 80053 COMPREHEN METABOLIC PANEL: CPT

## 2023-11-16 PROCEDURE — 88184 FLOWCYTOMETRY/ TC 1 MARKER: CPT | Performed by: INTERNAL MEDICINE

## 2023-11-16 PROCEDURE — 88311 DECALCIFY TISSUE: CPT | Mod: TC | Performed by: INTERNAL MEDICINE

## 2023-11-16 PROCEDURE — 88341 IMHCHEM/IMCYTCHM EA ADD ANTB: CPT | Mod: 26 | Performed by: STUDENT IN AN ORGANIZED HEALTH CARE EDUCATION/TRAINING PROGRAM

## 2023-11-16 PROCEDURE — 88188 FLOWCYTOMETRY/READ 9-15: CPT | Mod: GC | Performed by: STUDENT IN AN ORGANIZED HEALTH CARE EDUCATION/TRAINING PROGRAM

## 2023-11-16 PROCEDURE — 88342 IMHCHEM/IMCYTCHM 1ST ANTB: CPT | Mod: 26 | Performed by: STUDENT IN AN ORGANIZED HEALTH CARE EDUCATION/TRAINING PROGRAM

## 2023-11-16 PROCEDURE — 36415 COLL VENOUS BLD VENIPUNCTURE: CPT

## 2023-11-16 PROCEDURE — 86334 IMMUNOFIX E-PHORESIS SERUM: CPT | Mod: 26 | Performed by: PATHOLOGY

## 2023-11-16 PROCEDURE — 85097 BONE MARROW INTERPRETATION: CPT | Mod: GC | Performed by: STUDENT IN AN ORGANIZED HEALTH CARE EDUCATION/TRAINING PROGRAM

## 2023-11-16 PROCEDURE — 84165 PROTEIN E-PHORESIS SERUM: CPT | Mod: 26 | Performed by: PATHOLOGY

## 2023-11-16 PROCEDURE — 88305 TISSUE EXAM BY PATHOLOGIST: CPT | Mod: 26 | Performed by: STUDENT IN AN ORGANIZED HEALTH CARE EDUCATION/TRAINING PROGRAM

## 2023-11-16 PROCEDURE — 88184 FLOWCYTOMETRY/ TC 1 MARKER: CPT | Performed by: STUDENT IN AN ORGANIZED HEALTH CARE EDUCATION/TRAINING PROGRAM

## 2023-11-16 PROCEDURE — 88311 DECALCIFY TISSUE: CPT | Mod: 26 | Performed by: STUDENT IN AN ORGANIZED HEALTH CARE EDUCATION/TRAINING PROGRAM

## 2023-11-16 PROCEDURE — 38222 DX BONE MARROW BX & ASPIR: CPT | Performed by: PHYSICIAN ASSISTANT

## 2023-11-16 PROCEDURE — 88305 TISSUE EXAM BY PATHOLOGIST: CPT | Mod: TC | Performed by: INTERNAL MEDICINE

## 2023-11-16 PROCEDURE — 85004 AUTOMATED DIFF WBC COUNT: CPT

## 2023-11-16 PROCEDURE — 84155 ASSAY OF PROTEIN SERUM: CPT | Mod: 91

## 2023-11-16 PROCEDURE — 88185 FLOWCYTOMETRY/TC ADD-ON: CPT | Performed by: INTERNAL MEDICINE

## 2023-11-16 PROCEDURE — 83521 IG LIGHT CHAINS FREE EACH: CPT

## 2023-11-16 RX ORDER — ACETAMINOPHEN 325 MG/1
975 TABLET ORAL ONCE
Status: COMPLETED | OUTPATIENT
Start: 2023-11-16 | End: 2023-11-16

## 2023-11-16 RX ORDER — ONDANSETRON 2 MG/ML
4 INJECTION INTRAMUSCULAR; INTRAVENOUS EVERY 30 MIN PRN
Status: DISCONTINUED | OUTPATIENT
Start: 2023-11-16 | End: 2023-11-17 | Stop reason: HOSPADM

## 2023-11-16 RX ORDER — OXYCODONE HYDROCHLORIDE 5 MG/1
10 TABLET ORAL
Status: DISCONTINUED | OUTPATIENT
Start: 2023-11-16 | End: 2023-11-17 | Stop reason: HOSPADM

## 2023-11-16 RX ORDER — LIDOCAINE 40 MG/G
CREAM TOPICAL
Status: DISCONTINUED | OUTPATIENT
Start: 2023-11-16 | End: 2023-11-17 | Stop reason: HOSPADM

## 2023-11-16 RX ORDER — ONDANSETRON 4 MG/1
4 TABLET, ORALLY DISINTEGRATING ORAL EVERY 30 MIN PRN
Status: DISCONTINUED | OUTPATIENT
Start: 2023-11-16 | End: 2023-11-17 | Stop reason: HOSPADM

## 2023-11-16 RX ORDER — PROPOFOL 10 MG/ML
INJECTION, EMULSION INTRAVENOUS CONTINUOUS PRN
Status: DISCONTINUED | OUTPATIENT
Start: 2023-11-16 | End: 2023-11-16

## 2023-11-16 RX ORDER — PROPOFOL 10 MG/ML
INJECTION, EMULSION INTRAVENOUS PRN
Status: DISCONTINUED | OUTPATIENT
Start: 2023-11-16 | End: 2023-11-16

## 2023-11-16 RX ORDER — LIDOCAINE HYDROCHLORIDE 20 MG/ML
INJECTION, SOLUTION INFILTRATION; PERINEURAL PRN
Status: DISCONTINUED | OUTPATIENT
Start: 2023-11-16 | End: 2023-11-16

## 2023-11-16 RX ORDER — LIDOCAINE HYDROCHLORIDE 10 MG/ML
8-10 INJECTION, SOLUTION EPIDURAL; INFILTRATION; INTRACAUDAL; PERINEURAL
Status: DISCONTINUED | OUTPATIENT
Start: 2023-11-16 | End: 2023-11-17 | Stop reason: HOSPADM

## 2023-11-16 RX ORDER — OXYCODONE HYDROCHLORIDE 5 MG/1
5 TABLET ORAL
Status: DISCONTINUED | OUTPATIENT
Start: 2023-11-16 | End: 2023-11-17 | Stop reason: HOSPADM

## 2023-11-16 RX ORDER — SODIUM CHLORIDE, SODIUM LACTATE, POTASSIUM CHLORIDE, CALCIUM CHLORIDE 600; 310; 30; 20 MG/100ML; MG/100ML; MG/100ML; MG/100ML
INJECTION, SOLUTION INTRAVENOUS CONTINUOUS
Status: DISCONTINUED | OUTPATIENT
Start: 2023-11-16 | End: 2023-11-17 | Stop reason: HOSPADM

## 2023-11-16 RX ADMIN — PROPOFOL 50 MG: 10 INJECTION, EMULSION INTRAVENOUS at 11:52

## 2023-11-16 RX ADMIN — PROPOFOL 200 MCG/KG/MIN: 10 INJECTION, EMULSION INTRAVENOUS at 11:52

## 2023-11-16 RX ADMIN — SODIUM CHLORIDE, SODIUM LACTATE, POTASSIUM CHLORIDE, CALCIUM CHLORIDE: 600; 310; 30; 20 INJECTION, SOLUTION INTRAVENOUS at 11:48

## 2023-11-16 RX ADMIN — LIDOCAINE HYDROCHLORIDE 40 MG: 20 INJECTION, SOLUTION INFILTRATION; PERINEURAL at 11:52

## 2023-11-16 RX ADMIN — ACETAMINOPHEN 975 MG: 325 TABLET ORAL at 12:38

## 2023-11-16 ASSESSMENT — ENCOUNTER SYMPTOMS
SEIZURES: 0
DYSRHYTHMIAS: 0

## 2023-11-16 ASSESSMENT — LIFESTYLE VARIABLES: TOBACCO_USE: 0

## 2023-11-16 ASSESSMENT — PAIN SCALES - GENERAL: PAINLEVEL: NO PAIN (0)

## 2023-11-16 NOTE — LETTER
2023         RE: June Ronquillo  [address] [facility] [address]        Dear Colleague,    Thank you for referring your patient, June Ronquillo, to the [facility] BLOOD AND MARROW TRANSPLANT PROGRAM [place]. Please see a copy of my visit note below.    Patient Name: June Ronquillo  Patient MRN: [id number]  Patient : 1970    Abbreviated H&P and Pre-sedation Assessment for bone marrow biopsy and aspiration with sedation    Chief complaint and/or reason for Procedure: Multiple Myeloma     Planned level of sedation: MAC w/ local     History of problems with sedation: (patient or family hx): No    ASA Assessment Category: 2 - Mild systemic disease    History of sleep apnea: No    History of blood thinners: No     Appropriate NPO status: Yes    Current tobacco use: No    Any recent fever, cough, chest or sinus congestion, SOB, weight loss, chest pain, diarrhea or constipation. No    Medications   Currently Scheduled Medications       Home Med List)  (Not in a hospital admission)      Allergies  Blood transfusion related (informational only)    PMH:  Past Medical History:   Diagnosis Date    CAD (coronary artery disease)     s/p stent to CFX    Heart attack (H)     Hyperlipidemia LDL goal <100     Hypertension     Stented coronary artery     Thyroid disease        Past Surgical History:   Procedure Laterality Date    BONE MARROW BIOPSY, BONE SPECIMEN, NEEDLE/TROCAR Left 2021    Procedure: BIOPSY, BONE MARROW with aspiration and ancillary studies;  Surgeon: Niharika Regalado MD;  Location:  OR    BONE MARROW BIOPSY, BONE SPECIMEN, NEEDLE/TROCAR Left 10/14/2021    Procedure: BIOPSY, BONE MARROW;  Surgeon: Alexa Albert APRN CNP;  Location: [facility] OR    BONE MARROW BIOPSY, BONE SPECIMEN, NEEDLE/TROCAR Right 3/7/2022    Procedure: BIOPSY, BONE MARROW;  Surgeon: Deborah Carmona PA-C;  Location: [facility] OR    BONE MARROW BIOPSY, BONE SPECIMEN, NEEDLE/TROCAR  N/A 5/9/2022    Procedure: BIOPSY, BONE MARROW;  Surgeon: Deborah Leblanc PA-C;  Location: [facility] OR    BONE MARROW BIOPSY, BONE SPECIMEN, NEEDLE/TROCAR Left 12/5/2022    Procedure: BIOPSY, BONE MARROW;  Surgeon: Alba Moses APRN CNP;  Location: [facility] OR    HEART CATH PTCA SINGLE VESSEL  10/10/2004    Stent to [employer] - [employer]     INSERT CATHETER VASCULAR ACCESS Right 11/3/2021    Procedure: NON VALVED TUNNELED DUAL LUMEN APHARESIS CAPABLE LINE INSERTION, VASCULAR ACCESS CATHETER;  Surgeon: Werner Mcnamara MD;  Location: [facility] OR    IR CVC TUNNEL PLACEMENT > 5 YRS OF AGE  11/3/2021    IR CVC TUNNEL REMOVAL RIGHT  12/2/2021    THYROIDECTOMY N/A 5/3/2022    Procedure: total thyroidectomy, Left selective neck dissection level 6 and level 7 ;  Surgeon: Clarita Sepulveda MD;  Location: [facility] OR       Focused Physical exam pertinent to procedure:          (Details of heart, lung, ASA assessment and mallampati assessment in pre procedure assessment flowsheet)  General- healthy,alert,no distress   Recent vital signs-  /78 (BP Location: Right arm, Patient Position: Sitting, Cuff Size: Adult Regular)   Pulse 67   Temp 97.7  F (36.5  C) (Oral)   Resp 16   Wt 67.3 kg (148 lb 6.4 oz)   SpO2 100%   BMI 25.03 kg/m    HEART-regular rate and rhythm and no murmurs, gallops, or rub  LUNGS-Clear to Ausculation  OROPHARYNGEAL - MALLAMPATTI- Class II (visualization of the soft palate, fauces, and uvula)    A/P:Reviewed history, medications, allergies, clinical information and pre procedure assessment. The patient was informed of the risks and benefits of the procedure.  They would like to proceed.  June Ronquillo is approved for use of sedation during their procedure as noted above.      Mitchell Johnson PA-C

## 2023-11-16 NOTE — ANESTHESIA CARE TRANSFER NOTE
Patient: June Ronquillo    Procedure: Procedure(s):  Bone marrow biopsy, bone specimen, needle/trocar       Diagnosis: Multiple myeloma not having achieved remission (H) [C90.00]  Diagnosis Additional Information: No value filed.    Anesthesia Type:   MAC     Note:    Oropharynx: oropharynx clear of all foreign objects and spontaneously breathing  Level of Consciousness: awake  Oxygen Supplementation: room air    Independent Airway: airway patency satisfactory and stable  Dentition: dentition unchanged  Vital Signs Stable: post-procedure vital signs reviewed and stable  Report to RN Given: handoff report given  Patient transferred to: Phase II    Handoff Report: Identifed the Patient, Identified the Reponsible Provider, Reviewed the pertinent medical history, Discussed the surgical course, Reviewed Intra-OP anesthesia mangement and issues during anesthesia, Set expectations for post-procedure period and Allowed opportunity for questions and acknowledgement of understanding      Vitals:  Vitals Value Taken Time   /73 11/16/23 1215   Temp 36.5  C (97.7  F) 11/16/23 1215   Pulse 70 11/16/23 1215   Resp 14 11/16/23 1215   SpO2 100 % 11/16/23 1215       Electronically Signed By: PATTY Cueva CRNA  November 16, 2023  12:16 PM

## 2023-11-16 NOTE — NURSING NOTE
"Oncology Rooming Note    November 16, 2023 9:37 AM   June Ronquillo is a 53 year old male who presents for:    Chief Complaint   Patient presents with    Blood Draw     Labs drawn with  by rn.  VS taken.    Oncology Clinic Visit     Multiple myeloma     Initial Vitals: /78 (BP Location: Right arm, Patient Position: Sitting, Cuff Size: Adult Regular)   Pulse 67   Temp 97.7  F (36.5  C) (Oral)   Resp 16   Wt 67.3 kg (148 lb 6.4 oz)   SpO2 100%   BMI 25.03 kg/m   Estimated body mass index is 25.03 kg/m  as calculated from the following:    Height as of 10/12/23: 1.64 m (5' 4.57\").    Weight as of this encounter: 67.3 kg (148 lb 6.4 oz). Body surface area is 1.75 meters squared.  No Pain (0) Comment: Data Unavailable   No LMP for male patient.  Allergies reviewed: Yes  Medications reviewed: Yes    Medications: Medication refills not needed today.  Pharmacy name entered into [facility]:    [facility] [place], [place] - [address]  [facility] - [place], [place] - [address] [facility] [place], [place] - [phone]  [facility] [place], [place] - [address] [place] [phone]    Clinical concerns: none      Camila Humphries, EMT  11/16/2023              "

## 2023-11-16 NOTE — ANESTHESIA POSTPROCEDURE EVALUATION
Patient: June Ronquillo    Procedure: Procedure(s):  Bone marrow biopsy, bone specimen, needle/trocar       Anesthesia Type:  MAC    Note:  Disposition: Outpatient   Postop Pain Control: Uneventful            Sign Out: Well controlled pain   PONV: No   Neuro/Psych: Uneventful            Sign Out: Acceptable/Baseline neuro status   Airway/Respiratory: Uneventful            Sign Out: Acceptable/Baseline resp. status   CV/Hemodynamics: Uneventful            Sign Out: Acceptable CV status; No obvious hypovolemia; No obvious fluid overload   Other NRE: NONE   DID A NON-ROUTINE EVENT OCCUR?            Last vitals:  Vitals Value Taken Time   /82 11/16/23 1245   Temp 36.1  C (97  F) 11/16/23 1245   Pulse 70 11/16/23 1245   Resp 16 11/16/23 1245   SpO2 100 % 11/16/23 1245       Electronically Signed By: Jefry Lamb MD  November 16, 2023  1:59 PM

## 2023-11-16 NOTE — PROGRESS NOTES
Patient Name: June Ronquillo  Patient MRN: [id number]  Patient : 1970    Abbreviated H&P and Pre-sedation Assessment for bone marrow biopsy and aspiration with sedation    Chief complaint and/or reason for Procedure: Multiple Myeloma     Planned level of sedation: MAC w/ local     History of problems with sedation: (patient or family hx): No    ASA Assessment Category: 2 - Mild systemic disease    History of sleep apnea: No    History of blood thinners: No     Appropriate NPO status: Yes    Current tobacco use: No    Any recent fever, cough, chest or sinus congestion, SOB, weight loss, chest pain, diarrhea or constipation. No    Medications   Currently Scheduled Medications       Home Med List)  (Not in a hospital admission)      Allergies  Blood transfusion related (informational only)    PMH:  Past Medical History:   Diagnosis Date    CAD (coronary artery disease)     s/p stent to CFX    Heart attack (H)     Hyperlipidemia LDL goal <100     Hypertension     Stented coronary artery     Thyroid disease        Past Surgical History:   Procedure Laterality Date    BONE MARROW BIOPSY, BONE SPECIMEN, NEEDLE/TROCAR Left 2021    Procedure: BIOPSY, BONE MARROW with aspiration and ancillary studies;  Surgeon: Niharika Regalado MD;  Location: [facility] OR    BONE MARROW BIOPSY, BONE SPECIMEN, NEEDLE/TROCAR Left 10/14/2021    Procedure: BIOPSY, BONE MARROW;  Surgeon: Alexa Albert APRN CNP;  Location: [facility] OR    BONE MARROW BIOPSY, BONE SPECIMEN, NEEDLE/TROCAR Right 3/7/2022    Procedure: BIOPSY, BONE MARROW;  Surgeon: Deborah Carmona PA-C;  Location: [facility] OR    BONE MARROW BIOPSY, BONE SPECIMEN, NEEDLE/TROCAR N/A 2022    Procedure: BIOPSY, BONE MARROW;  Surgeon: Deborah Leblanc PA-C;  Location: [facility] OR    BONE MARROW BIOPSY, BONE SPECIMEN, NEEDLE/TROCAR Left 2022    Procedure: BIOPSY, BONE MARROW;  Surgeon: Alba Moses APRN CNP;  Location: [facility] OR    HEART CATH PTCA SINGLE VESSEL   10/10/2004    Stent to [employer] - [employer]     INSERT CATHETER VASCULAR ACCESS Right 11/3/2021    Procedure: NON VALVED TUNNELED DUAL LUMEN APHARESIS CAPABLE LINE INSERTION, VASCULAR ACCESS CATHETER;  Surgeon: Werner Mcnamara MD;  Location: [facility] OR    IR CVC TUNNEL PLACEMENT > 5 YRS OF AGE  11/3/2021    IR CVC TUNNEL REMOVAL RIGHT  12/2/2021    THYROIDECTOMY N/A 5/3/2022    Procedure: total thyroidectomy, Left selective neck dissection level 6 and level 7 ;  Surgeon: Clarita Sepulveda MD;  Location: [facility] OR       Focused Physical exam pertinent to procedure:          (Details of heart, lung, ASA assessment and mallampati assessment in pre procedure assessment flowsheet)  General- healthy,alert,no distress   Recent vital signs-  /78 (BP Location: Right arm, Patient Position: Sitting, Cuff Size: Adult Regular)   Pulse 67   Temp 97.7  F (36.5  C) (Oral)   Resp 16   Wt 67.3 kg (148 lb 6.4 oz)   SpO2 100%   BMI 25.03 kg/m    HEART-regular rate and rhythm and no murmurs, gallops, or rub  LUNGS-Clear to Ausculation  OROPHARYNGEAL - MALLAMPATTI- Class II (visualization of the soft palate, fauces, and uvula)    A/P:Reviewed history, medications, allergies, clinical information and pre procedure assessment. The patient was informed of the risks and benefits of the procedure.  They would like to proceed.  June Ronquillo is approved for use of sedation during their procedure as noted above.      Mitchell Johnson PA-C

## 2023-11-16 NOTE — LETTER
11/16/2023         RE: June Ronquillo  [address]  [facility] [address]        Dear Colleague,    Thank you for referring your patient, June Ronquillo, to the [facility] BLOOD AND MARROW TRANSPLANT PROGRAM [place]. Please see a copy of my visit note below.    See bone marrow biopsy op note.       Again, thank you for allowing me to participate in the care of your patient.        Sincerely,        [facility] Provider

## 2023-11-16 NOTE — DISCHARGE INSTRUCTIONS
How to Care for your Bone Marrow Biopsy    Activity  Relax and take it easy for the next 24 hours.   Resume regular activity after 24 hours.    Diet   Resume pre-procedure diet and drink plenty of fluids.    If you received sedation, you may feel a little nauseated so start with a clear liquid diet until the nausea passes.    Do Not Immerse Bone Marrow Biopsy Puncture Site in Water  Do not take a bath until the puncture site has healed.  Do not sit in a hot tub or spa until the puncture site has healed.  Do not swim until the puncture site has healed.  Wait 24 hours before taking a shower.    Drainage  Drainage should be minimal.  IF bleeding should occur and soaks through the dressing, lie down and put pressure on the puncture site.    IF bleeding persists, apply gentle pressure with your hand over the dressing for 5 minutes.    IF the pressure doesn't stop the bleeding, contact your provider immediately.    Dressing  Keep the dressing dry and in place for 24 hours, unless instructed otherwise.    IF bleeding soaks through the dressing in the first 24 hours do NOT remove the dressing as you may pull off any scab that has formed.  Instead, reinforce the dressing with extra gauze and tape.    No Alcohol  Do not drink alcoholic beverages for the next 24 hours.    No Driving or Operating Machinery  No driving or operating machinery for the next 24 hours.    Notify your provider IF:    Excessive bleeding or drainage at the puncture site    Excessive swelling, redness or tenderness at the puncture site    Fever above 100.5 degrees taken orally    Severe pain    Drainage that is green, yellow, thick white or has a bad odor    Telephone Numbers  Bone Marrow transplant clinic:  [phone] (Monday thru Friday, 8:00 am to 4:00 pm)  After business hours call the [facility]:  [phone] and ask for the Hematology/BMT doctor on call.  Or call the Emergency Room at the [facility]  [facility]:  [phone].

## 2023-11-16 NOTE — OP NOTE
"BMT ONC Adult Bone Marrow Biopsy Procedure Note  November 16, 2023  /82   Pulse 70   Temp 97  F (36.1  C) (Temporal)   Resp 16   Ht 1.651 m (5' 5\")   Wt 67.1 kg (148 lb)   SpO2 100%   BMI 24.63 kg/m       Learning needs assessment complete within 12 months? YES    DIAGNOSIS: multiple myeloma     PROCEDURE: Unilateral Bone Marrow Biopsy and Unilateral Aspirate    LOCATION: [facility] 5th floor-Procedure Room    Patient s identification was positively verified by patient identification band and invasive procedure safety checklist was completed. Informed consent was obtained. Following the administration of Propofol as pre-medication, patient was placed in the prone position and prepped and draped in a sterile manner. Approximately 10 cc of 1% Lidocaine was used over the right posterior iliac spine. Following this a 3 mm incision was made. Trephine bone marrow core(s) was (were) obtained from the [facility]. Bone marrow aspirates were obtained from the IC. Aspirates were sent for morphology and immunophenotyping. A total of approximately 10 ml of marrow was aspirated. Following this procedure a sterile dressing was applied to the bone marrow biopsy site(s). The patient was placed in the supine position to maintain pressure on the biopsy site. Post-procedure wound care instructions were given.     Complications: NO    Pain assessment: see nursing notes    Interventions: NO    Length of procedure:20 minutes or less      Procedure performed by: Sneha Vance PA-C        "

## 2023-11-16 NOTE — ANESTHESIA PREPROCEDURE EVALUATION
Anesthesia Pre-Procedure Evaluation    Patient: June Ronquillo   MRN: [id number] : 1970        Procedure : Procedure(s):  Bone marrow biopsy, bone specimen, needle/trocar          Past Medical History:   Diagnosis Date    CAD (coronary artery disease)     s/p stent to CFX    Heart attack (H)     Hyperlipidemia LDL goal <100     Hypertension     Stented coronary artery     Thyroid disease       Past Surgical History:   Procedure Laterality Date    BONE MARROW BIOPSY, BONE SPECIMEN, NEEDLE/TROCAR Left 2021    Procedure: BIOPSY, BONE MARROW with aspiration and ancillary studies;  Surgeon: Niharika Regalado MD;  Location: [facility] OR    BONE MARROW BIOPSY, BONE SPECIMEN, NEEDLE/TROCAR Left 10/14/2021    Procedure: BIOPSY, BONE MARROW;  Surgeon: Alexa Albert APRN CNP;  Location: [facility] OR    BONE MARROW BIOPSY, BONE SPECIMEN, NEEDLE/TROCAR Right 3/7/2022    Procedure: BIOPSY, BONE MARROW;  Surgeon: Deborah Carmona PA-C;  Location: [facility] OR    BONE MARROW BIOPSY, BONE SPECIMEN, NEEDLE/TROCAR N/A 2022    Procedure: BIOPSY, BONE MARROW;  Surgeon: Deborah Leblanc PA-C;  Location: [facility] OR    BONE MARROW BIOPSY, BONE SPECIMEN, NEEDLE/TROCAR Left 2022    Procedure: BIOPSY, BONE MARROW;  Surgeon: Alba Moses APRN CNP;  Location: [facility] OR    HEART CATH PTCA SINGLE VESSEL  10/10/2004    Stent to [facility]     INSERT CATHETER VASCULAR ACCESS Right 11/3/2021    Procedure: NON VALVED TUNNELED DUAL LUMEN APHARESIS CAPABLE LINE INSERTION, VASCULAR ACCESS CATHETER;  Surgeon: Werner Mcnamara MD;  Location: [facility] OR    IR CVC TUNNEL PLACEMENT > 5 YRS OF AGE  11/3/2021    IR CVC TUNNEL REMOVAL RIGHT  2021    THYROIDECTOMY N/A 5/3/2022    Procedure: total thyroidectomy, Left selective neck dissection level 6 and level 7 ;  Surgeon: lCarita Sepulveda MD;  Location: [facility] OR      Allergies   Allergen Reactions    Blood Transfusion Related (Informational Only) Other (See  Comments)     Patient has a history of a clinically significant antibody against RBC antigens.  A delay in compatible RBCs may occur.       Social History     Tobacco Use    Smoking status: Never    Smokeless tobacco: Never   Substance Use Topics    Alcohol use: Not Currently      Wt Readings from Last 1 Encounters:   11/15/23 67.2 kg (148 lb 1.6 oz)        Anesthesia Evaluation   Pt has had prior anesthetic. Type: MAC.    No history of anesthetic complications       ROS/MED HX  ENT/Pulmonary:    (-) tobacco use, asthma and sleep apnea   Neurologic:    (-) no seizures and no CVA   Cardiovascular:     (+)  hypertension- -  CAD -  - stent (prior stent to CFX)-  Drug Eluting Stent.                                 (-) taking anticoagulants/antiplatelets and arrhythmias   METS/Exercise Tolerance: >4 METS    Hematologic:    (-) anemia   Musculoskeletal:   (+)     fracture, Fractures: Previous rib fractures prompting eval for MM, but no current fractures.         GI/Hepatic:    (-) GERD and liver disease   Renal/Genitourinary:    (-) renal disease   Endo:     (+)          thyroid problem, hypothyroidism,           Psychiatric/Substance Use:    (-) psychiatric history   Infectious Disease:       Malignancy:   (+) Malignancy (Multiple myeloma, normal calcium, prior fractures prompted eval), History of Lymphoma/Leukemia.Lymph CA Active status post.      Other:      (+)  , no H/O Chronic Pain,         Physical Exam    Airway  airway exam normal      Mallampati: II       Respiratory Devices and Support         Dental       (+) Minor Abnormalities - some fillings, tiny chips      Cardiovascular   cardiovascular exam normal          Pulmonary   pulmonary exam normal                OUTSIDE LABS:  CBC:   Lab Results   Component Value Date    WBC 3.9 (L) 11/15/2023    WBC 4.9 10/19/2023    HGB 12.2 (L) 11/15/2023    HGB 13.4 10/19/2023    HCT 37.9 (L) 11/15/2023    HCT 40.6 10/19/2023     11/15/2023     10/19/2023  "    BMP:   Lab Results   Component Value Date     11/15/2023     10/19/2023    POTASSIUM 3.8 11/15/2023    POTASSIUM 4.5 10/19/2023    CHLORIDE 107 11/15/2023    CHLORIDE 103 10/19/2023    CO2 23 11/15/2023    CO2 24 10/19/2023    BUN 10.5 11/15/2023    BUN 10.6 10/19/2023    CR 1.10 11/15/2023    CR 1.24 (H) 10/19/2023     (H) 11/15/2023    GLC 95 10/19/2023     COAGS:   Lab Results   Component Value Date    PTT 41 (H) 11/22/2021    INR 0.98 12/05/2022    FIBR 485 11/21/2021     POC: No results found for: \"BGM\", \"HCG\", \"HCGS\"  HEPATIC:   Lab Results   Component Value Date    ALBUMIN 4.2 11/15/2023    PROTTOTAL 6.2 (L) 11/15/2023    ALT 19 11/15/2023    AST 32 11/15/2023    ALKPHOS 54 11/15/2023    BILITOTAL 0.4 11/15/2023     OTHER:   Lab Results   Component Value Date    LACT 0.6 (L) 04/19/2023    A1C 5.5 06/26/2023    HARDEEP 8.4 (L) 11/15/2023    PHOS 2.7 06/05/2023    MAG 2.6 (H) 04/28/2023    LIPASE 21 04/19/2023    AMYLASE 58 04/19/2023    TSH 0.58 06/26/2023    T4 1.36 06/17/2022    CRP <2.9 04/30/2021       Anesthesia Plan    ASA Status:  3    NPO Status:  NPO Appropriate    Anesthesia Type: MAC.     - Reason for MAC: straight local not clinically adequate   Induction: Intravenous.   Maintenance: TIVA.        Consents    Anesthesia Plan(s) and associated risks, benefits, and realistic alternatives discussed. Questions answered and patient/representative(s) expressed understanding.     - Discussed: Risks, Benefits and Alternatives for BOTH SEDATION and the PROCEDURE were discussed     - Discussed with:  Patient            Postoperative Care    Pain management: IV analgesics, Oral pain medications, Multi-modal analgesia.   PONV prophylaxis: Ondansetron (or other 5HT-3), Dexamethasone or Solumedrol, Background Propofol Infusion     Comments:           H&P reviewed: Unable to attach H&P to encounter due to EHR limitations. H&P Update: appropriate H&P reviewed, patient examined. No interval " changes since H&P (within 30 days).         Jefry Lamb MD

## 2023-11-16 NOTE — NURSING NOTE
Chief Complaint   Patient presents with    Blood Draw     Labs drawn with  by rn.  VS taken.     Labs drawn with  by rn.  Pt tolerated well.  VS taken.  Pt checked in for next appt.    Aubrie Fleming RN

## 2023-11-17 LAB
ALBUMIN SERPL ELPH-MCNC: 3.9 G/DL (ref 3.7–5.1)
ALPHA1 GLOB SERPL ELPH-MCNC: 0.3 G/DL (ref 0.2–0.4)
ALPHA2 GLOB SERPL ELPH-MCNC: 0.6 G/DL (ref 0.5–0.9)
B-GLOBULIN SERPL ELPH-MCNC: 0.8 G/DL (ref 0.6–1)
GAMMA GLOB SERPL ELPH-MCNC: 0.4 G/DL (ref 0.7–1.6)
M PROTEIN SERPL ELPH-MCNC: 0.1 G/DL
PATH REPORT.COMMENTS IMP SPEC: NORMAL
PATH REPORT.FINAL DX SPEC: NORMAL
PATH REPORT.FINAL DX SPEC: NORMAL
PATH REPORT.GROSS SPEC: NORMAL
PATH REPORT.MICROSCOPIC SPEC OTHER STN: NORMAL
PATH REPORT.RELEVANT HX SPEC: NORMAL
PATH REPORT.RELEVANT HX SPEC: NORMAL
PROT PATTERN SERPL ELPH-IMP: ABNORMAL
PROT PATTERN SERPL IFE-IMP: NORMAL

## 2023-11-24 ENCOUNTER — OFFICE VISIT (OUTPATIENT)
Dept: TRANSPLANT | Facility: CLINIC | Age: 53
End: 2023-11-24
Attending: STUDENT IN AN ORGANIZED HEALTH CARE EDUCATION/TRAINING PROGRAM
Payer: COMMERCIAL

## 2023-11-24 VITALS
OXYGEN SATURATION: 100 % | BODY MASS INDEX: 24.7 KG/M2 | TEMPERATURE: 97.7 F | WEIGHT: 148.4 LBS | SYSTOLIC BLOOD PRESSURE: 151 MMHG | HEART RATE: 83 BPM | DIASTOLIC BLOOD PRESSURE: 91 MMHG | RESPIRATION RATE: 20 BRPM

## 2023-11-24 DIAGNOSIS — Z52.011 AUTOLOGOUS DONOR, STEM CELLS: ICD-10-CM

## 2023-11-24 DIAGNOSIS — C90.00 MULTIPLE MYELOMA NOT HAVING ACHIEVED REMISSION (H): Primary | ICD-10-CM

## 2023-11-24 PROCEDURE — 90472 IMMUNIZATION ADMIN EACH ADD: CPT | Performed by: STUDENT IN AN ORGANIZED HEALTH CARE EDUCATION/TRAINING PROGRAM

## 2023-11-24 PROCEDURE — 250N000021 HC RX MED A9270 GY (STAT IND- M) 250: Performed by: STUDENT IN AN ORGANIZED HEALTH CARE EDUCATION/TRAINING PROGRAM

## 2023-11-24 PROCEDURE — 90648 HIB PRP-T VACCINE 4 DOSE IM: CPT | Performed by: STUDENT IN AN ORGANIZED HEALTH CARE EDUCATION/TRAINING PROGRAM

## 2023-11-24 PROCEDURE — G0463 HOSPITAL OUTPT CLINIC VISIT: HCPCS | Mod: 25

## 2023-11-24 PROCEDURE — 90707 MMR VACCINE SC: CPT | Performed by: STUDENT IN AN ORGANIZED HEALTH CARE EDUCATION/TRAINING PROGRAM

## 2023-11-24 PROCEDURE — 90670 PCV13 VACCINE IM: CPT | Performed by: STUDENT IN AN ORGANIZED HEALTH CARE EDUCATION/TRAINING PROGRAM

## 2023-11-24 PROCEDURE — 90723 DTAP-HEP B-IPV VACCINE IM: CPT | Performed by: STUDENT IN AN ORGANIZED HEALTH CARE EDUCATION/TRAINING PROGRAM

## 2023-11-24 PROCEDURE — 90471 IMMUNIZATION ADMIN: CPT | Performed by: STUDENT IN AN ORGANIZED HEALTH CARE EDUCATION/TRAINING PROGRAM

## 2023-11-24 PROCEDURE — 99214 OFFICE O/P EST MOD 30 MIN: CPT | Mod: GC

## 2023-11-24 PROCEDURE — 250N000011 HC RX IP 250 OP 636: Performed by: STUDENT IN AN ORGANIZED HEALTH CARE EDUCATION/TRAINING PROGRAM

## 2023-11-24 PROCEDURE — G0009 ADMIN PNEUMOCOCCAL VACCINE: HCPCS | Performed by: STUDENT IN AN ORGANIZED HEALTH CARE EDUCATION/TRAINING PROGRAM

## 2023-11-24 RX ADMIN — HAEMOPHILUS B POLYSACCHARIDE CONJUGATE VACCINE FOR INJ 0.5 ML: RECON SOLN at 13:13

## 2023-11-24 RX ADMIN — MEASLES, MUMPS, AND RUBELLA VIRUS VACCINE LIVE 0.5 ML: 1000; 12500; 1000 INJECTION, POWDER, LYOPHILIZED, FOR SUSPENSION SUBCUTANEOUS at 13:13

## 2023-11-24 RX ADMIN — PNEUMOCOCCAL 13-VALENT CONJUGATE VACCINE 0.5 ML: 2.2; 2.2; 2.2; 2.2; 2.2; 4.4; 2.2; 2.2; 2.2; 2.2; 2.2; 2.2; 2.2 INJECTION, SUSPENSION INTRAMUSCULAR at 13:14

## 2023-11-24 RX ADMIN — DIPHTHERIA AND TETANUS TOXOIDS AND ACELLULAR PERTUSSIS ADSORBED, HEPATITIS B (RECOMBINANT) AND INACTIVATED POLIOVIRUS VACCINE COMBINED 0.5 ML: 25; 10; 25; 25; 8; 10; 40; 8; 32 INJECTION, SUSPENSION INTRAMUSCULAR at 13:15

## 2023-11-24 ASSESSMENT — PAIN SCALES - GENERAL: PAINLEVEL: NO PAIN (0)

## 2023-11-24 NOTE — PROGRESS NOTES
Two-year Anniversary BMT Progress Note      Oncologic Hx:   - 4/30/2021 M spike of 34.8 in urine.M spike in blood 0.2; IgG 702 with depressed IgA and IgM. Beta 2 microglobulin 2.7. Lambda  with K/L ratio of 0.01. WBC 11.1 with absolute eos of 1.0. hgb, plt, Cr (1.1), and albumin WNL.    -4/30/2021 CT abd/pelvis showed sclerotic marrow changes with numerous lytic appearing lesions throughout the imaged portions of the spine, pelvis and ribs.      -PET scan 5/11/2021 Numerous osteolytic lesions which predominantly demonstrate uptake below liver background levels. There is one intraosseous lesion in the left lateral 9th rib that demonstrates uptake above background, possibly due to nondisplaced fracture. Adjacent to this lesion is mild  hypermetabolism to the left pleura, with new small left pleural effusion, suspicious for malignant effusion versus posttraumatic effusion. Pleural uptake may represent extramedullary myeloma extending along the intercostal space versus inflammation.    - BM bx 5/17/2021 with flow showing 4.0% plasma cells which express CD19 (dim), CD38 (bright), CD45 (dim), , and monotypic cytoplasmic lambda immunoglobulin light chains but lack CD20 and CD56.  Hypercellular bone marrow for age (approximately 75% cellularity) with adequate trilineage hematopoiesis. Plasma cell infiltrate: Interstitial, lambda monotypic and representing approximately 60% the bone marrow cellularity, by  immunohistochemical stain. Cytpgenetics with 2 related hypodiploid clones. One with loss of Y, monosomy 13 and 14 (5%) and one with translocation of 8p and 14, derivative 16 with long arm replaced by extra copy 1q,monosomy 21. FISH showed gain of 1q, loss of 13 and 14, IGH-MYC fusion t(8:14)    - Started Miracle-RVd 21 day with velcade on days 1,8,15.     -Cycle 2 Miracle-RVd. Dose reduce dex to 80 mg d/t fatigue and stomach upset on dex days. Also having cough with basilar streaking on CXR    - 8/31/2021 BM bx  "-rare suspicious plasma cells in touch imprint. No increase in plasma cells by morph.    -8/31/2021 PET/CT Diffuse osteolytic lesions throughout the axial and appendicular skeleton. Increased sclerosis since prior exam 5/11/2021 without increased metabolism or size.  Hypermetabolic pretracheal lymph node as well as hypermetabolic level 2 lymph nodes within the right neck. These lymph nodes are likely reactive from autoimmune activation via medical therapy. Hypermetabolic subcentimeter nodules within the thyroid gland    - 11/11/2021 Autologous BMT    - 2/21/22 started maintenance with Revlimid 10 mg daily and daratumumab monthly; did Revlimid alone for C1 due to low IgG levels, added daratumumab starting C2    -11/2023 Stop zometa as he has done 2 years and this is causing fatigue. Change maintenance revlimid to 21/28 days and keep daratumumab every 4 weeks for now.       Interval Hx:   June returns to clinic today for a follow-up visit.    He reports no major changes since his last visit in clinic in August 2023.    He continues to feel fatigued, and follows with Endocrinology for low testosterone management.    His peripheral neuropathy is managed with vit B complex supplementation and cold avoidance.    He is still plagued by dysgeusia, but \"tastes by memory.\"    He does endorse mild URI sx (runny nose), but no fevers or chills.    Otherwise, he feels overall well. No purulent cough or SOB, no CP, no flagrant nausea, no diarrhea, no new rashes.    Remainder of ROS negative.    Physical Exam:  BP (!) 151/91 (BP Location: Right arm, Patient Position: Sitting, Cuff Size: Adult Regular)   Pulse 83   Temp 97.7  F (36.5  C) (Oral)   Resp 20   Wt 67.3 kg (148 lb 6.4 oz)   SpO2 100%   BMI 24.70 kg/m      Constitutional: NAD  Eyes: no scleral icterus  ENT: no oral lesions  Pulm: CTAB (but with very slight wheezing/bronchial sounds on DONNA)  CV: RRR, no m/r/g  GI: bowel sounds present, soft and nontender to " palpation  MSK: No edema in the extremities  Neuro: alert, conversant, normal gait  Psych: appropriate mood and affect      Assessment and Plan  Logan has multiple myeloma with high risk cytogenetics. He got Vidya-RVD with VGPR, then underwent autologous transplant 11/11/21. Given his high risk cytogenetics he started vidya + revlimid maintenance therapy 2/21/22. Year 2 evaluation consistent with stringent complete remission based on PET/CT, BMBx, and molecular biomarkers.   Based on this, will plan to do lenalidomide 10 mg 21 days on 7 days off (previously daily), to help with dysgeusia.  Daratumumab will be continued for now. Based on future evaluation (e.g., Clonoseq from periphery at next visit), its need can be reassessed.  Patient is now 2 years from transplant. He experienced flu-like symptoms with zoledronic acid (especially at usual dose, but also after reduction to 3 mg monthly). Therefore, zoledronic acid will be discontinued moving forward.  He will continue acyclovir daily for viral ppx and bactrim M/T for PJP ppx. Follow IgG and replete if it remains <300.  He also will continue Vit D    Vaccinations  Patient is due for and is in agreement to get vaccines for the following, which were given in clinic [place] 11/24/2023:  - DTaP- Hep B - IPV (PEDIARIX)  - H influenza (ActIB)  - MMR  - Pneumococcus (PREVNAR 13)  - VZV (SHINGRIX)      Elevated serum creatinine  This has normalized after he increased fluids    Hypogammaglobulinemia Start monthly IV Ig whenever IgG <300    Normocytic Anemia: likely d/t revlimid and daratumumab. Will keep monitoring and dose reduce if needed.     Blood pressure elevation to 151/91 today 11/24/23, though patient reports running from car to clinic.       Discussed with patient and Dr George Charles  PGY4  Hematology, Oncology, and Transplantation  [phone]

## 2023-11-24 NOTE — NURSING NOTE
"Oncology Rooming Note    November 24, 2023 12:12 PM   June Ronquillo is a 53 year old male who presents for:    Chief Complaint   Patient presents with    Oncology Clinic Visit     History of peripheral stem cell transplant     Initial Vitals: BP (!) 151/91 (BP Location: Right arm, Patient Position: Sitting, Cuff Size: Adult Regular)   Pulse 83   Temp 97.7  F (36.5  C) (Oral)   Resp 20   Wt 67.3 kg (148 lb 6.4 oz)   SpO2 100%   BMI 24.70 kg/m   Estimated body mass index is 24.7 kg/m  as calculated from the following:    Height as of 11/16/23: 1.651 m (5' 5\").    Weight as of this encounter: 67.3 kg (148 lb 6.4 oz). Body surface area is 1.76 meters squared.  No Pain (0) Comment: Data Unavailable   No LMP for male patient.  Allergies reviewed: Yes  Medications reviewed: Yes    Medications: Medication refills not needed today.  Pharmacy name entered into [facility]:    [facility] [place], [place] - [address]  [facility] [place], [place] - [address] [facility] [place], [place] - [id number]  [facility] [place], [place] - [address] [facility]    Clinical concerns: none      Camila Humphries, EMT  11/24/2023              "

## 2023-11-24 NOTE — NURSING NOTE
Pt received his 2 year vaccines:  Pediri  Prevnar-13  HIB  MMR    Pt. Declined the Shingrix as he said he is taking Acyclovire right now. He will get this next summer. This order was canceled.     See SHELTON Obrien, RMA

## 2023-11-24 NOTE — LETTER
11/24/2023         RE: June Ronquillo  [address]  [place] [facility] [address]        Dear Colleague,    Thank you for referring your patient, June Ronquillo, to the [facility]OW [facility]ANSPLANT [facility] [place]. Please see a copy of my visit note below.    Two-year Anniversary BMT Progress Note      Oncologic Hx:   - 4/30/2021 M spike of 34.8 in urine.M spike in blood 0.2; IgG 702 with depressed IgA and IgM. Beta 2 microglobulin 2.7. Lambda  with K/L ratio of 0.01. WBC 11.1 with absolute eos of 1.0. hgb, plt, Cr (1.1), and albumin WNL.    -4/30/2021 CT abd/pelvis showed sclerotic marrow changes with numerous lytic appearing lesions throughout the imaged portions of the spine, pelvis and ribs.      -PET scan 5/11/2021 Numerous osteolytic lesions which predominantly demonstrate uptake below liver background levels. There is one intraosseous lesion in the left lateral 9th rib that demonstrates uptake above background, possibly due to nondisplaced fracture. Adjacent to this lesion is mild  hypermetabolism to the left pleura, with new small left pleural effusion, suspicious for malignant effusion versus posttraumatic effusion. Pleural uptake may represent extramedullary myeloma extending along the intercostal space versus inflammation.    - BM bx 5/17/2021 with flow showing 4.0% plasma cells which express CD19 (dim), CD38 (bright), CD45 (dim), , and monotypic cytoplasmic lambda immunoglobulin light chains but lack CD20 and CD56.  Hypercellular bone marrow for age (approximately 75% cellularity) with adequate trilineage hematopoiesis. Plasma cell infiltrate: Interstitial, lambda monotypic and representing approximately 60% the bone marrow cellularity, by  immunohistochemical stain. Cytpgenetics with 2 related hypodiploid clones. One with loss of Y, monosomy 13 and 14 (5%) and one with translocation of 8p and 14, derivative 16 with long arm replaced by extra copy  "1q,monosomy 21. FISH showed gain of 1q, loss of 13 and 14, IGH-MYC fusion t(8:14)    - Started Miracle-RVd 21 day with velcade on days 1,8,15.     -Cycle 2 Miracle-RVd. Dose reduce dex to 80 mg d/t fatigue and stomach upset on dex days. Also having cough with basilar streaking on CXR    - 8/31/2021 BM bx -rare suspicious plasma cells in touch imprint. No increase in plasma cells by morph.    -8/31/2021 PET/CT Diffuse osteolytic lesions throughout the axial and appendicular skeleton. Increased sclerosis since prior exam 5/11/2021 without increased metabolism or size.  Hypermetabolic pretracheal lymph node as well as hypermetabolic level 2 lymph nodes within the right neck. These lymph nodes are likely reactive from autoimmune activation via medical therapy. Hypermetabolic subcentimeter nodules within the thyroid gland    - 11/11/2021 Autologous BMT    - 2/21/22 started maintenance with Revlimid 10 mg daily and daratumumab monthly; did Revlimid alone for C1 due to low IgG levels, added daratumumab starting C2    -11/2023 Stop zometa as he has done 2 years and this is causing fatigue. Change maintenance revlimid to 21/28 days and keep daratumumab every 4 weeks for now.       Interval Hx:   June returns to clinic today for a follow-up visit.    He reports no major changes since his last visit in clinic in August 2023.    He continues to feel fatigued, and follows with Endocrinology for low testosterone management.    His peripheral neuropathy is managed with vit B complex supplementation and cold avoidance.    He is still plagued by dysgeusia, but \"tastes by memory.\"    He does endorse mild URI sx (runny nose), but no fevers or chills.    Otherwise, he feels overall well. No purulent cough or SOB, no CP, no flagrant nausea, no diarrhea, no new rashes.    Remainder of ROS negative.    Physical Exam:  BP (!) 151/91 (BP Location: Right arm, Patient Position: Sitting, Cuff Size: Adult Regular)   Pulse 83   Temp 97.7  F " (36.5  C) (Oral)   Resp 20   Wt 67.3 kg (148 lb 6.4 oz)   SpO2 100%   BMI 24.70 kg/m      Constitutional: NAD  Eyes: no scleral icterus  ENT: no oral lesions  Pulm: CTAB (but with very slight wheezing/bronchial sounds on DONNA)  CV: RRR, no m/r/g  GI: bowel sounds present, soft and nontender to palpation  MSK: No edema in the extremities  Neuro: alert, conversant, normal gait  Psych: appropriate mood and affect      Assessment and Plan  Logan has multiple myeloma with high risk cytogenetics. He got Miracle-RVD with VGPR, then underwent autologous transplant 11/11/21. Given his high risk cytogenetics he started miracle + revlimid maintenance therapy 2/21/22. Year 2 evaluation consistent with stringent complete remission based on PET/CT, BMBx, and molecular biomarkers.   Based on this, will plan to do lenalidomide 10 mg 21 days on 7 days off (previously daily), to help with dysgeusia.  Daratumumab will be continued for now. Based on future evaluation (e.g., Clonoseq from periphery at next visit), its need can be reassessed.  Patient is now 2 years from transplant. He experienced flu-like symptoms with zoledronic acid (especially at usual dose, but also after reduction to 3 mg monthly). Therefore, zoledronic acid will be discontinued moving forward.  He will continue acyclovir daily for viral ppx and bactrim M/T for PJP ppx. Follow IgG and replete if it remains <300.  He also will continue Vit D    Vaccinations  Patient is due for and is in agreement to get vaccines for the following, which were given in clinic [place] 11/24/2023:  - DTaP- Hep B - IPV (PEDIARIX)  - H influenza (ActIB)  - MMR  - Pneumococcus (PREVNAR 13)  - VZV (SHINGRIX)      Elevated serum creatinine  This has normalized after he increased fluids    Hypogammaglobulinemia Start monthly IV Ig whenever IgG <300    Normocytic Anemia: likely d/t revlimid and daratumumab. Will keep monitoring and dose reduce if needed.     Blood pressure elevation to 151/91 today  11/24/23, though patient reports running from car to clinic.       Discussed with patient and Dr George Charles  PGY4  Hematology, Oncology, and Transplantation  [phone]    Attestation with edits by Barbi Vazquez MD at 11/24/2023  2:33 PM:  Physician Attestation  I, Barbi Vazquez MD, saw this patient and agree with the findings and plan of care as documented in the note.      Items personally reviewed/procedural attestation: vitals, labs, and imaging and agree with the interpretation documented in the note.  Logan is doing well. Change maintenance revlimid to 21/28 days and keep daratumumab every 4 weeks for now. Stop zometa d/t sx and continue Vit D for bone health    Barbi Vazquez MD

## 2023-12-05 ENCOUNTER — ANCILLARY PROCEDURE (OUTPATIENT)
Dept: ULTRASOUND IMAGING | Facility: CLINIC | Age: 53
End: 2023-12-05
Attending: INTERNAL MEDICINE
Payer: COMMERCIAL

## 2023-12-05 DIAGNOSIS — E06.3 HYPOTHYROIDISM DUE TO HASHIMOTO'S THYROIDITIS: ICD-10-CM

## 2023-12-05 DIAGNOSIS — C90.00 MULTIPLE MYELOMA NOT HAVING ACHIEVED REMISSION (H): Primary | ICD-10-CM

## 2023-12-05 DIAGNOSIS — C73 PAPILLARY MICROCARCINOMA OF THYROID (H): ICD-10-CM

## 2023-12-05 PROCEDURE — 76536 US EXAM OF HEAD AND NECK: CPT | Performed by: RADIOLOGY

## 2023-12-05 RX ORDER — ALBUTEROL SULFATE 0.83 MG/ML
2.5 SOLUTION RESPIRATORY (INHALATION)
Status: CANCELLED | OUTPATIENT
Start: 2024-02-09

## 2023-12-05 RX ORDER — NALOXONE HYDROCHLORIDE 0.4 MG/ML
0.2 INJECTION, SOLUTION INTRAMUSCULAR; INTRAVENOUS; SUBCUTANEOUS
Status: CANCELLED | OUTPATIENT
Start: 2024-02-09

## 2023-12-05 RX ORDER — HEPARIN SODIUM,PORCINE 10 UNIT/ML
5 VIAL (ML) INTRAVENOUS
Status: CANCELLED | OUTPATIENT
Start: 2024-02-09

## 2023-12-05 RX ORDER — ALBUTEROL SULFATE 90 UG/1
1-2 AEROSOL, METERED RESPIRATORY (INHALATION)
Status: CANCELLED
Start: 2024-02-09

## 2023-12-05 RX ORDER — HEPARIN SODIUM (PORCINE) LOCK FLUSH IV SOLN 100 UNIT/ML 100 UNIT/ML
5 SOLUTION INTRAVENOUS
Status: CANCELLED | OUTPATIENT
Start: 2024-02-09

## 2023-12-05 RX ORDER — DEXAMETHASONE 4 MG/1
12 TABLET ORAL ONCE
Status: CANCELLED
Start: 2024-02-09 | End: 2024-02-02

## 2023-12-05 RX ORDER — METHYLPREDNISOLONE SODIUM SUCCINATE 125 MG/2ML
125 INJECTION, POWDER, LYOPHILIZED, FOR SOLUTION INTRAMUSCULAR; INTRAVENOUS
Status: CANCELLED
Start: 2024-02-09

## 2023-12-05 RX ORDER — EPINEPHRINE 1 MG/ML
0.3 INJECTION, SOLUTION INTRAMUSCULAR; SUBCUTANEOUS EVERY 5 MIN PRN
Status: CANCELLED | OUTPATIENT
Start: 2024-02-09

## 2023-12-05 RX ORDER — DIPHENHYDRAMINE HCL 25 MG
50 CAPSULE ORAL ONCE
Status: CANCELLED
Start: 2024-02-09 | End: 2024-02-02

## 2023-12-05 RX ORDER — DIPHENHYDRAMINE HYDROCHLORIDE 50 MG/ML
50 INJECTION INTRAMUSCULAR; INTRAVENOUS
Status: CANCELLED
Start: 2024-02-09

## 2023-12-05 RX ORDER — ACETAMINOPHEN 325 MG/1
650 TABLET ORAL ONCE
Status: CANCELLED
Start: 2024-02-09 | End: 2024-02-02

## 2023-12-05 RX ORDER — MEPERIDINE HYDROCHLORIDE 25 MG/ML
25 INJECTION INTRAMUSCULAR; INTRAVENOUS; SUBCUTANEOUS EVERY 30 MIN PRN
Status: CANCELLED | OUTPATIENT
Start: 2024-02-09

## 2023-12-05 NOTE — TELEPHONE ENCOUNTER
"Nurse Triage SBAR    Situation: chills and right abd/back pain    Background:    DX: multiple myeloma with high risk cytogenetics  Provider: /Laura Ballesteros  Most recent appointment: 04/11/23 w/  Upcoming appointments: 05/08/23 w/Laura Ballesteros  Most recent treatment: 04/11/23 cycle 16 day 1 darzalex faspro, zometa      Assessment:   Onset: 2 days ago    Initially sx started with bad stomach ache and back ache all on lower right side. Would hurt more after urinating. Currently \"low pain.\" Pain is not as sharp as it was before, continuing to improve. Rates 2-3/10. Describes pain as cramping and dull right now. When it started it was sharp and couldn't move. Abd does feel a little tender when pressing down on that the right side.  Has been taking Tylenol 500 mg TID. Has not taking any today. Last time he took Tylenol was at 2am.    Pt reports 2 x each day \"body gets cold and starts shaking.\" Took temperature 0900 and it was 99F oral temp. Last episode of chills was last night. \"It was terrible, had to put on 3 layers of blankets.\" Writer asked pt to take temp again while on the phone, oral temp is 99.1 F.    First day pt did experience 3 episodes of lightheadedness. Started pushing fluids, water and Gatorade, lightheadedness improved.     Feels run down, like he has run a marathon or been out in the sun. Very low energy, different for him.      These sx have happened initially when he started tx last year. Have not happened in a while.     Denies chest pain, SOB, N/V, problems with bowels or bladder, bleeding, headaches, cough.      Recommendation:   Page to provider: [phone]   [id number]  recommending pt be seen by DOROTA today.  1135 Scheduling notified to contact pt to assit them in scheduling visit with Yasmin Johnson today at 1:45pm. Pt also needs lab appt for CBC, CMP and COVID test before appt per Yasmin. Pt aware    " If you are a smoker, it is important for your health to stop smoking. Please be aware that second hand smoke is also harmful.

## 2023-12-07 DIAGNOSIS — C90.00 MULTIPLE MYELOMA NOT HAVING ACHIEVED REMISSION (H): Primary | ICD-10-CM

## 2023-12-07 RX ORDER — LENALIDOMIDE 10 MG/1
10 CAPSULE ORAL DAILY
Qty: 21 CAPSULE | Refills: 0 | Status: SHIPPED | OUTPATIENT
Start: 2023-12-07 | End: 2023-12-12

## 2023-12-11 ENCOUNTER — MYC MEDICAL ADVICE (OUTPATIENT)
Dept: ENDOCRINOLOGY | Facility: CLINIC | Age: 53
End: 2023-12-11
Payer: COMMERCIAL

## 2023-12-11 DIAGNOSIS — E06.3 HYPOTHYROIDISM DUE TO HASHIMOTO'S THYROIDITIS: ICD-10-CM

## 2023-12-12 ENCOUNTER — TELEPHONE (OUTPATIENT)
Dept: ONCOLOGY | Facility: CLINIC | Age: 53
End: 2023-12-12
Payer: COMMERCIAL

## 2023-12-12 DIAGNOSIS — C90.00 MULTIPLE MYELOMA NOT HAVING ACHIEVED REMISSION (H): ICD-10-CM

## 2023-12-12 RX ORDER — LENALIDOMIDE 10 MG/1
10 CAPSULE ORAL DAILY
Qty: 21 CAPSULE | Refills: 0 | Status: SHIPPED | OUTPATIENT
Start: 2023-12-12 | End: 2024-01-04

## 2023-12-12 NOTE — TELEPHONE ENCOUNTER
Oral Chemotherapy Monitoring Program   Medication: Revlimid  Rx: 10mg PO daily on days 1 through 21 of 28 day cycle   Auth #: [id number]   Risk Category: Adult Male  Routine survey questions reviewed.   Rx to be Escribed to [facility]    Yu Goldberg  [facility]  Oncology Pharmacy Liaison   [email or web address]  [phone] (phone)  [phone] (fax)

## 2023-12-13 RX ORDER — LEVOTHYROXINE SODIUM 125 UG/1
125 TABLET ORAL DAILY
Qty: 90 TABLET | Refills: 0 | Status: SHIPPED | OUTPATIENT
Start: 2023-12-13 | End: 2024-01-24

## 2023-12-13 NOTE — PROGRESS NOTES
Virtual Visit Details    Type of service:  Video Visit     Originating Location (pt. Location): Home    Distant Location (provider location):  On-site  Platform used for Video Visit: Adán        [facility]  Date of visit: 12/13/2023        Oncologic Hx:   - 4/30/2021 M spike of 34.8 in urine.M spike in blood 0.2; IgG 702 with depressed IgA and IgM. Beta 2 microglobulin 2.7. Lambda  with K/L ratio of 0.01. WBC 11.1 with absolute eos of 1.0. hgb, plt, Cr (1.1), and albumin WNL.    -4/30/2021 CT abd/pelvis showed sclerotic marrow changes with numerous lytic appearing lesions throughout the imaged portions of the spine, pelvis and ribs.      -PET scan 5/11/2021 Numerous osteolytic lesions which predominantly demonstrate uptake below liver background levels. There is one intraosseous lesion in the left lateral 9th rib that demonstrates uptake above background, possibly due to nondisplaced fracture. Adjacent to this lesion is mild  hypermetabolism to the left pleura, with new small left pleural effusion, suspicious for malignant effusion versus posttraumatic effusion. Pleural uptake may represent extramedullary myeloma extending along the intercostal space versus inflammation.    - BM bx 5/17/2021 with flow showing 4.0% plasma cells which express CD19 (dim), CD38 (bright), CD45 (dim), , and monotypic cytoplasmic lambda immunoglobulin light chains but lack CD20 and CD56.  Hypercellular bone marrow for age (approximately 75% cellularity) with adequate trilineage hematopoiesis. Plasma cell infiltrate: Interstitial, lambda monotypic and representing approximately 60% the bone marrow cellularity, by  immunohistochemical stain. Cytpgenetics with 2 related hypodiploid clones. One with loss of Y, monosomy 13 and 14 (5%) and one with translocation of 8p and 14, derivative 16 with long arm replaced by extra copy 1q,monosomy 21. FISH showed gain of 1q, loss of 13 and 14, IGH-MYC  fusion t(8:14)    - Started Miracle-RVd 21 day with velcade on days 1,8,15.     -Cycle 2 Miracle-RVd. Dose reduce dex to 80 mg d/t fatigue and stomach upset on dex days. Also having cough with basilar streaking on CXR    - 8/31/2021 BM bx -rare suspicious plasma cells in touch imprint. No increase in plasma cells by morph.    -8/31/2021 PET/CT Diffuse osteolytic lesions throughout the axial and appendicular skeleton. Increased sclerosis since prior exam 5/11/2021 without increased metabolism or size.  Hypermetabolic pretracheal lymph node as well as hypermetabolic level 2 lymph nodes within the right neck. These lymph nodes are likely reactive from autoimmune activation via medical therapy. Hypermetabolic subcentimeter nodules within the thyroid gland    - 11/11/2021 Autologous BMT    - 2/21/22 started maintenance with Revlimid 10 mg and daratumumab monthly; did Revlimid alone for C1 due to low IgG levels, added daratumumab starting C2.  Initially did Revlimid daily, decreased to 21/28 days in Nov 2023 to help minimize side effects    Interval Hx:   June presents via video for toxicity check on miracle+Revlimid maintenance.  He is overall doing well today. He is eating and drinking without issue and good appetite.      Previous report of intermittent numbness in the L hand middle and ring finger has resolved.      Denies fevers/chills, SOB/cough, chest pain, bleeding issues,  N/V,  constipation, rashes/sores, night sweats, new pains, new lumps/bumps. edema, mouth sores..    Video physical exam  General: Patient appears well in no acute distress.   Skin: No visualized rash or lesions on visualized skin  Eyes: EOMI, no erythema, sclera icterus or discharge noted  Resp: Appears to be breathing comfortably without accessory muscle usage, speaking in full sentences, no cough  MSK: Appears to have normal range of motion based on visualized movements  Neurologic: No apparent tremors, facial movements symmetric  Psych: affect  bright, alert and oriented      Most Recent 3 CBC's:  Recent Labs   Lab Test 11/16/23  0926 11/15/23  0940 10/19/23  1401   WBC 4.2 3.9* 4.9   HGB 13.1* 12.2* 13.4   MCV 92 92 92    231 395   ANEUTAUTO 2.0 1.7 2.1     Most Recent 3 BMP's:  Recent Labs   Lab Test 11/16/23  0926 11/15/23  0940 10/19/23  1401    139 137   POTASSIUM 4.1 3.8 4.5   CHLORIDE 106 107 103   CO2 22 23 24   BUN 8.5 10.5 10.6   CR 1.13 1.10 1.24*   ANIONGAP 8 9 10   HARDEEP 8.4* 8.4* 9.1   GLC 96 101* 95   PROTTOTAL 6.4 6.2* 6.9   ALBUMIN 4.3 4.2 4.4    Most Recent 3 LFT's:  Recent Labs   Lab Test 11/16/23  0926 11/15/23  0940 10/19/23  1401   AST 34 32 34   ALT 20 19 19   ALKPHOS 57 54 66   BILITOTAL 0.4 0.4 0.3    Most Recent 2 TSH and T4:  Recent Labs   Lab Test 06/26/23  1442 12/19/22  1103 06/17/22  1332 11/14/21  1103 10/20/21  1145   TSH 0.58 0.93 0.07*   < > 0.70   T4  --   --  1.36  --  0.97    < > = values in this interval not displayed.     I reviewed the above labs today.      Assessment and Plan  Logan has multiple myeloma with high risk cytogenetics. He got Vidya-RVD with VGPR, then underwent autologous transplant 11/11/21. Continue acyclovir daily for viral ppx and bactrim M/T for PJP ppx. Given his high risk cytogenetics he started vidya + revlimid maintenance therapy 2/21/22. He completed 2 years of zometa in Sept 2023. Continue Vit D. Revlimid was decreased to 21/28 days in Nov 2023 to try to minimize side effects (dysgeusia, flu-like symptoms on following daratumumab)  - Continue vidya + revlimid  - Completed 2 years of Zometa   - Will give influenza vaccine tomorrow (12/15); COVID vaccine in Jan (waiting 3 months after suspected COVID infection in October)    Hypogammaglobulinemia   Start monthly IV Ig if IgG <300  - 11/16 IgG 438    Normocytic Anemia:   Likely d/t revlimid and daratumumab. Will keep monitoring and dose reduce if needed.      Elevated BP:    Previously discussed his elevated BP in clinic and recommended  following with his PCP.  He has been taking his BP at home and they are within a normal range.    35 minutes spent on the date of the encounter doing chart review, review of test results, interpretation of tests, patient visit, and documentation     PATTY Meza [facility] [address] [id number] [phone]

## 2023-12-14 ENCOUNTER — VIRTUAL VISIT (OUTPATIENT)
Dept: ONCOLOGY | Facility: CLINIC | Age: 53
End: 2023-12-14
Attending: STUDENT IN AN ORGANIZED HEALTH CARE EDUCATION/TRAINING PROGRAM
Payer: COMMERCIAL

## 2023-12-14 VITALS — HEIGHT: 65 IN | BODY MASS INDEX: 24.99 KG/M2 | WEIGHT: 150 LBS

## 2023-12-14 DIAGNOSIS — C90.00 MULTIPLE MYELOMA NOT HAVING ACHIEVED REMISSION (H): Primary | ICD-10-CM

## 2023-12-14 DIAGNOSIS — C90.01 MULTIPLE MYELOMA IN REMISSION (H): ICD-10-CM

## 2023-12-14 PROCEDURE — 99215 OFFICE O/P EST HI 40 MIN: CPT | Mod: 95 | Performed by: REGISTERED NURSE

## 2023-12-14 ASSESSMENT — PAIN SCALES - GENERAL: PAINLEVEL: NO PAIN (0)

## 2023-12-14 NOTE — NURSING NOTE
Is the patient currently in the state of [place]? YES    Visit mode:VIDEO    If the visit is dropped, the patient can be reconnected by: VIDEO VISIT: Text to cell phone:   Telephone Information:   Mobile [phone]       Will anyone else be joining the visit? NO  (If patient encounters technical issues they should call [phone]    How would you like to obtain your AVS? MyChart    Are changes needed to the allergy or medication list? No    Reason for visit: YINKA MCADAMS

## 2023-12-14 NOTE — LETTER
12/14/2023         RE: June Ronquillo  [address]  [place] [facility] [place] [address]        Dear Colleague,    Thank you for referring your patient, June Ronquillo, to the [facility]. Please see a copy of my visit note below.    Virtual Visit Details    Type of service:  Video Visit     Originating Location (pt. Location): Home    Distant Location (provider location):  On-site  Platform used for Video Visit: [facility] [place]  Date of visit: 12/13/2023        Oncologic Hx:   - 4/30/2021 M spike of 34.8 in urine.M spike in blood 0.2; IgG 702 with depressed IgA and IgM. Beta 2 microglobulin 2.7. Lambda  with K/L ratio of 0.01. WBC 11.1 with absolute eos of 1.0. hgb, plt, Cr (1.1), and albumin WNL.    -4/30/2021 CT abd/pelvis showed sclerotic marrow changes with numerous lytic appearing lesions throughout the imaged portions of the spine, pelvis and ribs.      -PET scan 5/11/2021 Numerous osteolytic lesions which predominantly demonstrate uptake below liver background levels. There is one intraosseous lesion in the left lateral 9th rib that demonstrates uptake above background, possibly due to nondisplaced fracture. Adjacent to this lesion is mild  hypermetabolism to the left pleura, with new small left pleural effusion, suspicious for malignant effusion versus posttraumatic effusion. Pleural uptake may represent extramedullary myeloma extending along the intercostal space versus inflammation.    - BM bx 5/17/2021 with flow showing 4.0% plasma cells which express CD19 (dim), CD38 (bright), CD45 (dim), , and monotypic cytoplasmic lambda immunoglobulin light chains but lack CD20 and CD56.  Hypercellular bone marrow for age (approximately 75% cellularity) with adequate trilineage hematopoiesis. Plasma cell infiltrate: Interstitial, lambda monotypic and representing approximately 60% the bone marrow cellularity, by   immunohistochemical stain. Cytpgenetics with 2 related hypodiploid clones. One with loss of Y, monosomy 13 and 14 (5%) and one with translocation of 8p and 14, derivative 16 with long arm replaced by extra copy 1q,monosomy 21. FISH showed gain of 1q, loss of 13 and 14, IGH-MYC fusion t(8:14)    - Started Miracle-RVd 21 day with velcade on days 1,8,15.     -Cycle 2 Miracle-RVd. Dose reduce dex to 80 mg d/t fatigue and stomach upset on dex days. Also having cough with basilar streaking on CXR    - 8/31/2021 BM bx -rare suspicious plasma cells in touch imprint. No increase in plasma cells by morph.    -8/31/2021 PET/CT Diffuse osteolytic lesions throughout the axial and appendicular skeleton. Increased sclerosis since prior exam 5/11/2021 without increased metabolism or size.  Hypermetabolic pretracheal lymph node as well as hypermetabolic level 2 lymph nodes within the right neck. These lymph nodes are likely reactive from autoimmune activation via medical therapy. Hypermetabolic subcentimeter nodules within the thyroid gland    - 11/11/2021 Autologous BMT    - 2/21/22 started maintenance with Revlimid 10 mg and daratumumab monthly; did Revlimid alone for C1 due to low IgG levels, added daratumumab starting C2.  Initially did Revlimid daily, decreased to 21/28 days in Nov 2023 to help minimize side effects    Interval Hx:   June presents via video for toxicity check on miracle+Revlimid maintenance.  He is overall doing well today. He is eating and drinking without issue and good appetite.      Previous report of intermittent numbness in the L hand middle and ring finger has resolved.      Denies fevers/chills, SOB/cough, chest pain, bleeding issues,  N/V,  constipation, rashes/sores, night sweats, new pains, new lumps/bumps. edema, mouth sores..    Video physical exam  General: Patient appears well in no acute distress.   Skin: No visualized rash or lesions on visualized skin  Eyes: EOMI, no erythema, sclera icterus or  discharge noted  Resp: Appears to be breathing comfortably without accessory muscle usage, speaking in full sentences, no cough  MSK: Appears to have normal range of motion based on visualized movements  Neurologic: No apparent tremors, facial movements symmetric  Psych: affect bright, alert and oriented      Most Recent 3 CBC's:  Recent Labs   Lab Test 11/16/23  0926 11/15/23  0940 10/19/23  1401   WBC 4.2 3.9* 4.9   HGB 13.1* 12.2* 13.4   MCV 92 92 92    231 395   ANEUTAUTO 2.0 1.7 2.1     Most Recent 3 BMP's:  Recent Labs   Lab Test 11/16/23  0926 11/15/23  0940 10/19/23  1401    139 137   POTASSIUM 4.1 3.8 4.5   CHLORIDE 106 107 103   CO2 22 23 24   BUN 8.5 10.5 10.6   CR 1.13 1.10 1.24*   ANIONGAP 8 9 10   HARDEEP 8.4* 8.4* 9.1   GLC 96 101* 95   PROTTOTAL 6.4 6.2* 6.9   ALBUMIN 4.3 4.2 4.4    Most Recent 3 LFT's:  Recent Labs   Lab Test 11/16/23  0926 11/15/23  0940 10/19/23  1401   AST 34 32 34   ALT 20 19 19   ALKPHOS 57 54 66   BILITOTAL 0.4 0.4 0.3    Most Recent 2 TSH and T4:  Recent Labs   Lab Test 06/26/23  1442 12/19/22  1103 06/17/22  1332 11/14/21  1103 10/20/21  1145   TSH 0.58 0.93 0.07*   < > 0.70   T4  --   --  1.36  --  0.97    < > = values in this interval not displayed.     I reviewed the above labs today.      Assessment and Plan  Logan has multiple myeloma with high risk cytogenetics. He got Vidya-RVD with VGPR, then underwent autologous transplant 11/11/21. Continue acyclovir daily for viral ppx and bactrim M/T for PJP ppx. Given his high risk cytogenetics he started vidya + revlimid maintenance therapy 2/21/22. He completed 2 years of zometa in Sept 2023. Continue Vit D. Revlimid was decreased to 21/28 days in Nov 2023 to try to minimize side effects (dysgeusia, flu-like symptoms on following daratumumab)  - Continue vidya + revlimid  - Completed 2 years of Zometa   - Will give influenza vaccine tomorrow (12/15); COVID vaccine in Jan (waiting 3 months after suspected COVID infection in  October)    Hypogammaglobulinemia   Start monthly IV Ig if IgG <300  - 11/16 IgG 438    Normocytic Anemia:   Likely d/t revlimid and daratumumab. Will keep monitoring and dose reduce if needed.      Elevated BP:    Previously discussed his elevated BP in clinic and recommended following with his PCP.  He has been taking his BP at home and they are within a normal range.    35 minutes spent on the date of the encounter doing chart review, review of test results, interpretation of tests, patient visit, and documentation     PATTY Meza [facility] [address] [id number] [phone]

## 2023-12-15 ENCOUNTER — APPOINTMENT (OUTPATIENT)
Dept: LAB | Facility: CLINIC | Age: 53
End: 2023-12-15
Attending: STUDENT IN AN ORGANIZED HEALTH CARE EDUCATION/TRAINING PROGRAM
Payer: COMMERCIAL

## 2023-12-15 ENCOUNTER — INFUSION THERAPY VISIT (OUTPATIENT)
Dept: ONCOLOGY | Facility: CLINIC | Age: 53
End: 2023-12-15
Attending: STUDENT IN AN ORGANIZED HEALTH CARE EDUCATION/TRAINING PROGRAM
Payer: COMMERCIAL

## 2023-12-15 VITALS
RESPIRATION RATE: 18 BRPM | DIASTOLIC BLOOD PRESSURE: 74 MMHG | OXYGEN SATURATION: 100 % | HEART RATE: 74 BPM | BODY MASS INDEX: 24.4 KG/M2 | TEMPERATURE: 97.7 F | SYSTOLIC BLOOD PRESSURE: 127 MMHG | WEIGHT: 146.6 LBS

## 2023-12-15 DIAGNOSIS — C90.00 MULTIPLE MYELOMA NOT HAVING ACHIEVED REMISSION (H): ICD-10-CM

## 2023-12-15 DIAGNOSIS — Z23 NEED FOR PROPHYLACTIC VACCINATION AND INOCULATION AGAINST INFLUENZA: Primary | ICD-10-CM

## 2023-12-15 LAB
ALBUMIN SERPL BCG-MCNC: 4.4 G/DL (ref 3.5–5.2)
ALP SERPL-CCNC: 60 U/L (ref 40–150)
ALT SERPL W P-5'-P-CCNC: 13 U/L (ref 0–70)
ANION GAP SERPL CALCULATED.3IONS-SCNC: 10 MMOL/L (ref 7–15)
AST SERPL W P-5'-P-CCNC: 27 U/L (ref 0–45)
BASOPHILS # BLD AUTO: 0.1 10E3/UL (ref 0–0.2)
BASOPHILS NFR BLD AUTO: 1 %
BILIRUB SERPL-MCNC: 0.5 MG/DL
BUN SERPL-MCNC: 9.4 MG/DL (ref 6–20)
CALCIUM SERPL-MCNC: 8.7 MG/DL (ref 8.6–10)
CHLORIDE SERPL-SCNC: 105 MMOL/L (ref 98–107)
CREAT SERPL-MCNC: 1.14 MG/DL (ref 0.67–1.17)
DEPRECATED HCO3 PLAS-SCNC: 24 MMOL/L (ref 22–29)
EGFRCR SERPLBLD CKD-EPI 2021: 76 ML/MIN/1.73M2
EOSINOPHIL # BLD AUTO: 0.6 10E3/UL (ref 0–0.7)
EOSINOPHIL NFR BLD AUTO: 9 %
ERYTHROCYTE [DISTWIDTH] IN BLOOD BY AUTOMATED COUNT: 16.2 % (ref 10–15)
GLUCOSE SERPL-MCNC: 98 MG/DL (ref 70–99)
HCT VFR BLD AUTO: 38.2 % (ref 40–53)
HGB BLD-MCNC: 12.7 G/DL (ref 13.3–17.7)
IMM GRANULOCYTES # BLD: 0 10E3/UL
IMM GRANULOCYTES NFR BLD: 0 %
LYMPHOCYTES # BLD AUTO: 1.3 10E3/UL (ref 0.8–5.3)
LYMPHOCYTES NFR BLD AUTO: 20 %
MCH RBC QN AUTO: 30.5 PG (ref 26.5–33)
MCHC RBC AUTO-ENTMCNC: 33.2 G/DL (ref 31.5–36.5)
MCV RBC AUTO: 92 FL (ref 78–100)
MONOCYTES # BLD AUTO: 0.7 10E3/UL (ref 0–1.3)
MONOCYTES NFR BLD AUTO: 10 %
NEUTROPHILS # BLD AUTO: 3.8 10E3/UL (ref 1.6–8.3)
NEUTROPHILS NFR BLD AUTO: 60 %
NRBC # BLD AUTO: 0 10E3/UL
NRBC BLD AUTO-RTO: 0 /100
PLATELET # BLD AUTO: 324 10E3/UL (ref 150–450)
POTASSIUM SERPL-SCNC: 3.8 MMOL/L (ref 3.4–5.3)
PROT SERPL-MCNC: 6.5 G/DL (ref 6.4–8.3)
RBC # BLD AUTO: 4.16 10E6/UL (ref 4.4–5.9)
SODIUM SERPL-SCNC: 139 MMOL/L (ref 135–145)
TOTAL PROTEIN SERUM FOR ELP: 6.2 G/DL (ref 6.4–8.3)
WBC # BLD AUTO: 6.4 10E3/UL (ref 4–11)

## 2023-12-15 PROCEDURE — 84155 ASSAY OF PROTEIN SERUM: CPT | Mod: 91

## 2023-12-15 PROCEDURE — G0008 ADMIN INFLUENZA VIRUS VAC: HCPCS | Performed by: STUDENT IN AN ORGANIZED HEALTH CARE EDUCATION/TRAINING PROGRAM

## 2023-12-15 PROCEDURE — 84165 PROTEIN E-PHORESIS SERUM: CPT | Mod: 26 | Performed by: PATHOLOGY

## 2023-12-15 PROCEDURE — 250N000011 HC RX IP 250 OP 636: Mod: JZ | Performed by: STUDENT IN AN ORGANIZED HEALTH CARE EDUCATION/TRAINING PROGRAM

## 2023-12-15 PROCEDURE — 86334 IMMUNOFIX E-PHORESIS SERUM: CPT | Mod: 26 | Performed by: PATHOLOGY

## 2023-12-15 PROCEDURE — 90682 RIV4 VACC RECOMBINANT DNA IM: CPT | Performed by: STUDENT IN AN ORGANIZED HEALTH CARE EDUCATION/TRAINING PROGRAM

## 2023-12-15 PROCEDURE — 83521 IG LIGHT CHAINS FREE EACH: CPT

## 2023-12-15 PROCEDURE — 85025 COMPLETE CBC W/AUTO DIFF WBC: CPT

## 2023-12-15 PROCEDURE — 84165 PROTEIN E-PHORESIS SERUM: CPT | Mod: TC | Performed by: PATHOLOGY

## 2023-12-15 PROCEDURE — 86334 IMMUNOFIX E-PHORESIS SERUM: CPT | Performed by: PATHOLOGY

## 2023-12-15 PROCEDURE — 96401 CHEMO ANTI-NEOPL SQ/IM: CPT

## 2023-12-15 PROCEDURE — 80053 COMPREHEN METABOLIC PANEL: CPT

## 2023-12-15 PROCEDURE — 82784 ASSAY IGA/IGD/IGG/IGM EACH: CPT

## 2023-12-15 PROCEDURE — 36415 COLL VENOUS BLD VENIPUNCTURE: CPT

## 2023-12-15 RX ADMIN — DARATUMUMAB AND HYALURONIDASE-FIHJ (HUMAN RECOMBINANT) 1800 MG: 1800; 30000 INJECTION SUBCUTANEOUS at 14:09

## 2023-12-15 RX ADMIN — INFLUENZA A VIRUS A/WEST VIRGINIA/30/2022 (H1N1) RECOMBINANT HEMAGGLUTININ ANTIGEN, INFLUENZA A VIRUS A/DARWIN/6/2021 (H3N2) RECOMBINANT HEMAGGLUTININ ANTIGEN, INFLUENZA B VIRUS B/AUSTRIA/1359417/2021 RECOMBINANT HEMAGGLUTININ ANTIGEN, AND INFLUENZA B VIRUS B/PHUKET/3073/2013 RECOMBINANT HEMAGGLUTININ ANTIGEN 0.5 ML: 45; 45; 45; 45 INJECTION INTRAMUSCULAR at 13:47

## 2023-12-15 ASSESSMENT — PAIN SCALES - GENERAL: PAINLEVEL: NO PAIN (0)

## 2023-12-15 NOTE — PROGRESS NOTES
Infusion Nursing Note:  June Ronquillo presents today for Daratumumab-Hyaluronidase-fihj (Darzalex Faspro) and flu shot.    Patient seen by provider today: No: Laura Donohue, KELSY   present during visit today: Not Applicable.    Note: June comes into the clinic today doing well overall and has no concerns to offer following his provider visit yesterday. He has no pain and denies s/s of infection.    Flu shot given by CHARLEY Way. Patient was given information on flu shot prior to injection.      Treatment Conditions:  Lab Results   Component Value Date    HGB 12.7 (L) 12/15/2023    WBC 6.4 12/15/2023    ANEU 4.7 03/13/2023    ANEUTAUTO 3.8 12/15/2023     12/15/2023        Lab Results   Component Value Date     12/15/2023    POTASSIUM 3.8 12/15/2023    MAG 2.6 (H) 04/28/2023    CR 1.14 12/15/2023    HARDEEP 8.7 12/15/2023    BILITOTAL 0.5 12/15/2023    ALBUMIN 4.4 12/15/2023    ALT 13 12/15/2023    AST 27 12/15/2023       Results reviewed, labs MET treatment parameters, ok to proceed with treatment.      Post Infusion Assessment:  Patient tolerated Darzalex Faspro injection to RLQ Abdomen without incident.       Discharge Plan:   Patient declined prescription refills.  Discharge instructions reviewed with: Patient.  Patient and/or family verbalized understanding of discharge instructions and all questions answered.  AVS to patient via [employer].  Patient will return 1/5/24 for next appointment.   Patient discharged in stable condition accompanied by: self.  Departure Mode: Ambulatory.      Lacy Ferguson RN

## 2023-12-15 NOTE — NURSING NOTE
Chief Complaint   Patient presents with    Blood Draw     Labs drawn via PIV placed by VAT. VS taken.     Labs drawn from PIV placed by VAT. Line flushed with saline. Vitals taken. Pt checked in for appointment(s).     Elo Baumann RN

## 2023-12-15 NOTE — PATIENT INSTRUCTIONS
[facility] Triage and after hours / weekends / holidays:  [phone]    Please call the triage or after hours line if you experience a temperature greater than or equal to 100.4, shaking chills, have uncontrolled nausea, vomiting and/or diarrhea, dizziness, shortness of breath, chest pain, bleeding, unexplained bruising, or if you have any other new/concerning symptoms, questions or concerns.      If you are having any concerning symptoms or wish to speak to a provider before your next infusion visit, please call triage to notify them so we can adequately serve you.     If you need a refill on a narcotic prescription or other medication, please call before your infusion appointment.

## 2023-12-18 LAB
ALBUMIN SERPL ELPH-MCNC: 4.1 G/DL (ref 3.7–5.1)
ALPHA1 GLOB SERPL ELPH-MCNC: 0.3 G/DL (ref 0.2–0.4)
ALPHA2 GLOB SERPL ELPH-MCNC: 0.6 G/DL (ref 0.5–0.9)
B-GLOBULIN SERPL ELPH-MCNC: 0.8 G/DL (ref 0.6–1)
GAMMA GLOB SERPL ELPH-MCNC: 0.4 G/DL (ref 0.7–1.6)
IGA SERPL-MCNC: 67 MG/DL (ref 84–499)
IGG SERPL-MCNC: 399 MG/DL (ref 610–1616)
IGM SERPL-MCNC: 21 MG/DL (ref 35–242)
KAPPA LC FREE SER-MCNC: 1.12 MG/DL (ref 0.33–1.94)
KAPPA LC FREE/LAMBDA FREE SER NEPH: 1.33 {RATIO} (ref 0.26–1.65)
LAMBDA LC FREE SERPL-MCNC: 0.84 MG/DL (ref 0.57–2.63)
M PROTEIN SERPL ELPH-MCNC: 0.1 G/DL
PROT PATTERN SERPL ELPH-IMP: ABNORMAL
PROT PATTERN SERPL IFE-IMP: NORMAL

## 2023-12-20 ENCOUNTER — PATIENT OUTREACH (OUTPATIENT)
Dept: ONCOLOGY | Facility: CLINIC | Age: 53
End: 2023-12-20
Payer: COMMERCIAL

## 2023-12-20 DIAGNOSIS — C90.00 MULTIPLE MYELOMA NOT HAVING ACHIEVED REMISSION (H): Primary | ICD-10-CM

## 2023-12-21 ENCOUNTER — MYC REFILL (OUTPATIENT)
Dept: ONCOLOGY | Facility: CLINIC | Age: 53
End: 2023-12-21
Payer: COMMERCIAL

## 2023-12-21 DIAGNOSIS — C90.00 MULTIPLE MYELOMA NOT HAVING ACHIEVED REMISSION (H): ICD-10-CM

## 2023-12-21 RX ORDER — POTASSIUM CHLORIDE 750 MG/1
20 TABLET, EXTENDED RELEASE ORAL DAILY
Qty: 60 TABLET | Refills: 3 | Status: SHIPPED | OUTPATIENT
Start: 2023-12-21 | End: 2023-12-26

## 2023-12-21 NOTE — TELEPHONE ENCOUNTER
Medication requested: potassium chloride ER 10 meq cr tablet    Last prescribing provider: Dr.Aimee George MD    Last clinic visit date: 12/14/23 with Laura Ballesteros CNP    Recommendations for requested medication (if none, N/A): NA    Any other pertinent information (if none, N/A): NA    Refilled: Y/N, if NO, why?    Pended and Routed to Laura Ballesteros CNP

## 2023-12-22 NOTE — PROGRESS NOTES
[facility]: Cancer Care                                                                                          Clonoseq orders placed per request from Dr. Vazquez    Signature:  Christina Zarate RN

## 2023-12-26 DIAGNOSIS — C90.00 MULTIPLE MYELOMA NOT HAVING ACHIEVED REMISSION (H): ICD-10-CM

## 2023-12-26 RX ORDER — POTASSIUM CHLORIDE 750 MG/1
20 TABLET, EXTENDED RELEASE ORAL DAILY
Qty: 60 TABLET | Refills: 3 | Status: SHIPPED | OUTPATIENT
Start: 2023-12-26 | End: 2024-05-29

## 2024-01-03 ENCOUNTER — LAB REQUISITION (OUTPATIENT)
Dept: LAB | Facility: CLINIC | Age: 54
End: 2024-01-03
Payer: COMMERCIAL

## 2024-01-04 DIAGNOSIS — C90.00 MULTIPLE MYELOMA NOT HAVING ACHIEVED REMISSION (H): Primary | ICD-10-CM

## 2024-01-04 RX ORDER — LENALIDOMIDE 10 MG/1
10 CAPSULE ORAL DAILY
Qty: 21 CAPSULE | Refills: 0 | Status: SHIPPED | OUTPATIENT
Start: 2024-01-04 | End: 2024-02-01

## 2024-01-05 ENCOUNTER — ONCOLOGY VISIT (OUTPATIENT)
Dept: ONCOLOGY | Facility: CLINIC | Age: 54
End: 2024-01-05
Attending: STUDENT IN AN ORGANIZED HEALTH CARE EDUCATION/TRAINING PROGRAM
Payer: COMMERCIAL

## 2024-01-05 ENCOUNTER — APPOINTMENT (OUTPATIENT)
Dept: LAB | Facility: CLINIC | Age: 54
End: 2024-01-05
Attending: STUDENT IN AN ORGANIZED HEALTH CARE EDUCATION/TRAINING PROGRAM
Payer: COMMERCIAL

## 2024-01-05 VITALS
RESPIRATION RATE: 16 BRPM | DIASTOLIC BLOOD PRESSURE: 82 MMHG | TEMPERATURE: 97.8 F | OXYGEN SATURATION: 100 % | SYSTOLIC BLOOD PRESSURE: 122 MMHG | WEIGHT: 150.5 LBS | BODY MASS INDEX: 25.04 KG/M2 | HEART RATE: 74 BPM

## 2024-01-05 DIAGNOSIS — C90.00 MULTIPLE MYELOMA NOT HAVING ACHIEVED REMISSION (H): Primary | ICD-10-CM

## 2024-01-05 LAB
BASOPHILS # BLD AUTO: 0.1 10E3/UL (ref 0–0.2)
BASOPHILS NFR BLD AUTO: 1 %
EOSINOPHIL # BLD AUTO: 0.2 10E3/UL (ref 0–0.7)
EOSINOPHIL NFR BLD AUTO: 4 %
ERYTHROCYTE [DISTWIDTH] IN BLOOD BY AUTOMATED COUNT: 15.4 % (ref 10–15)
HCT VFR BLD AUTO: 35.9 % (ref 40–53)
HGB BLD-MCNC: 11.7 G/DL (ref 13.3–17.7)
IMM GRANULOCYTES # BLD: 0 10E3/UL
IMM GRANULOCYTES NFR BLD: 0 %
LYMPHOCYTES # BLD AUTO: 1.3 10E3/UL (ref 0.8–5.3)
LYMPHOCYTES NFR BLD AUTO: 28 %
MCH RBC QN AUTO: 29.6 PG (ref 26.5–33)
MCHC RBC AUTO-ENTMCNC: 32.6 G/DL (ref 31.5–36.5)
MCV RBC AUTO: 91 FL (ref 78–100)
MONOCYTES # BLD AUTO: 0.7 10E3/UL (ref 0–1.3)
MONOCYTES NFR BLD AUTO: 15 %
NEUTROPHILS # BLD AUTO: 2.5 10E3/UL (ref 1.6–8.3)
NEUTROPHILS NFR BLD AUTO: 52 %
NRBC # BLD AUTO: 0 10E3/UL
NRBC BLD AUTO-RTO: 0 /100
PLATELET # BLD AUTO: 363 10E3/UL (ref 150–450)
RBC # BLD AUTO: 3.95 10E6/UL (ref 4.4–5.9)
TOTAL PROTEIN SERUM FOR ELP: 6.2 G/DL (ref 6.4–8.3)
WBC # BLD AUTO: 4.8 10E3/UL (ref 4–11)

## 2024-01-05 PROCEDURE — 99213 OFFICE O/P EST LOW 20 MIN: CPT | Mod: 25 | Performed by: NURSE PRACTITIONER

## 2024-01-05 PROCEDURE — 250N000011 HC RX IP 250 OP 636: Mod: JZ | Performed by: STUDENT IN AN ORGANIZED HEALTH CARE EDUCATION/TRAINING PROGRAM

## 2024-01-05 PROCEDURE — 84165 PROTEIN E-PHORESIS SERUM: CPT | Mod: 26 | Performed by: PATHOLOGY

## 2024-01-05 PROCEDURE — 36415 COLL VENOUS BLD VENIPUNCTURE: CPT

## 2024-01-05 PROCEDURE — 99215 OFFICE O/P EST HI 40 MIN: CPT | Performed by: NURSE PRACTITIONER

## 2024-01-05 PROCEDURE — 84165 PROTEIN E-PHORESIS SERUM: CPT | Mod: TC | Performed by: PATHOLOGY

## 2024-01-05 PROCEDURE — 82784 ASSAY IGA/IGD/IGG/IGM EACH: CPT

## 2024-01-05 PROCEDURE — 85025 COMPLETE CBC W/AUTO DIFF WBC: CPT

## 2024-01-05 PROCEDURE — 83521 IG LIGHT CHAINS FREE EACH: CPT

## 2024-01-05 PROCEDURE — 96401 CHEMO ANTI-NEOPL SQ/IM: CPT

## 2024-01-05 PROCEDURE — 84155 ASSAY OF PROTEIN SERUM: CPT

## 2024-01-05 RX ADMIN — DARATUMUMAB AND HYALURONIDASE-FIHJ (HUMAN RECOMBINANT) 1800 MG: 1800; 30000 INJECTION SUBCUTANEOUS at 15:51

## 2024-01-05 NOTE — PROGRESS NOTES
[facility]  Date of visit: 01/08/2024        Oncologic History  - 4/30/2021 M spike of 34.8 in urine.M spike in blood 0.2; IgG 702 with depressed IgA and IgM. Beta 2 microglobulin 2.7. Lambda  with K/L ratio of 0.01. WBC 11.1 with absolute eos of 1.0. hgb, plt, Cr (1.1), and albumin WNL.    -4/30/2021 CT abd/pelvis showed sclerotic marrow changes with numerous lytic appearing lesions throughout the imaged portions of the spine, pelvis and ribs.      -PET scan 5/11/2021 Numerous osteolytic lesions which predominantly demonstrate uptake below liver background levels. There is one intraosseous lesion in the left lateral 9th rib that demonstrates uptake above background, possibly due to nondisplaced fracture. Adjacent to this lesion is mild hypermetabolism to the left pleura, with new small left pleural effusion, suspicious for malignant effusion versus posttraumatic effusion. Pleural uptake may represent extramedullary myeloma extending along the intercostal space versus inflammation.    - BM bx 5/17/2021 with flow showing 4.0% plasma cells which express CD19 (dim), CD38 (bright), CD45 (dim), , and monotypic cytoplasmic lambda immunoglobulin light chains but lack CD20 and CD56.  Hypercellular bone marrow for age (approximately 75% cellularity) with adequate trilineage hematopoiesis. Plasma cell infiltrate: Interstitial, lambda monotypic and representing approximately 60% the bone marrow cellularity, by  immunohistochemical stain. Cytpgenetics with 2 related hypodiploid clones. One with loss of Y, monosomy 13 and 14 (5%) and one with translocation of 8p and 14, derivative 16 with long arm replaced by extra copy 1q,monosomy 21. FISH showed gain of 1q, loss of 13 and 14, IGH-MYC fusion t(8:14)    - Started Miracle-RVd 21 day with velcade on days 1,8,15.     -Cycle 2 Miracle-RVd. Dose reduce dex to 80 mg d/t fatigue and stomach upset on dex days. Also having cough with basilar  streaking on CXR    - 8/31/2021 BM bx -rare suspicious plasma cells in touch imprint. No increase in plasma cells by morph.    -8/31/2021 PET/CT Diffuse osteolytic lesions throughout the axial and appendicular skeleton. Increased sclerosis since prior exam 5/11/2021 without increased metabolism or size.  Hypermetabolic pretracheal lymph node as well as hypermetabolic level 2 lymph nodes within the right neck. These lymph nodes are likely reactive from autoimmune activation via medical therapy. Hypermetabolic subcentimeter nodules within the thyroid gland    - 11/11/2021 Autologous BMT    - 2/21/22 started maintenance with Revlimid 10 mg and daratumumab monthly; did Revlimid alone for C1 due to low IgG levels, added daratumumab starting C2.  Initially did Revlimid daily, decreased to 21/28 days in Nov 2023 to help minimize side effects    Interval Hx:   June presents toxicity  evaluation prior to receiving vidya+Revlimid maintenance  today.  Doing well.  Traveling for work.Takes infectious precautions (wears mask onplanes).  He is eating and drinking without issue and good appetite.    Intermittent numbness in the L hand,   Denies fevers/chills, SOB/cough, chest pain, bleeding issues,  N/V,  constipation, rashes/sores, night sweats, new pains, new lumps/bumps. edema, mouth sores.    Physical Exam:    /82 (BP Location: Right arm, Patient Position: Sitting, Cuff Size: Adult Regular)   Pulse 74   Temp 97.8  F (36.6  C) (Oral)   Resp 16   Wt 68.3 kg (150 lb 8 oz)   SpO2 100%   BMI 25.04 kg/m      Wt Readings from Last 5 Encounters:   01/05/24 68.3 kg (150 lb 8 oz)   12/15/23 66.5 kg (146 lb 9.6 oz)   12/14/23 68 kg (150 lb)   11/24/23 67.3 kg (148 lb 6.4 oz)   11/16/23 67.1 kg (148 lb)       Constitutional: Appears stated age, well-groomed.  Accompanied by self. In no apparent distress.   HEENT: Normocephilic. EOMI, PERRL. Sclerae are anicteric. Oral mucosa is pink and moist with no lesions or thrush;  "gums normal and good dentition.   Respiratory: No increased work of breathing, good air exchange, clear to auscultation bilaterally, no crackles or wheezing.  Cardiovascular: Normal apical impulse, regular rate and rhythm, normal S1 and S2, and no murmur noted.  GI: +BS. Soft. No tenderness on palpation.  Lymph/Hematologic: No cervical lymphadenopathy and no supraclavicular lymphadenopathy.  Skin: No bruising or bleeding, normal skin color, texture, turgor, no redness, warmth, or swelling, no rashes, no lesions, no jaundice.  Extremities: No bilateral lower extremity edema.  Neurologic: Awake, alert, oriented to name, place and time.    Vascular access: None      Allergies   Allergen Reactions    Blood Transfusion Related (Informational Only) Other (See Comments)     Patient has a history of a clinically significant antibody against RBC antigens.  A delay in compatible RBCs may occur.      Current Outpatient Medications   Medication    acyclovir (ZOVIRAX) 400 MG tablet    albuterol (PROAIR HFA/PROVENTIL HFA/VENTOLIN HFA) 108 (90 Base) MCG/ACT inhaler    aspirin (ASA) 81 MG EC tablet    calcium carbonate-vitamin D (OSCAL) 500-5 MG-MCG tablet    LENalidomide (REVLIMID) 10 MG CAPS capsule    levothyroxine (SYNTHROID/LEVOTHROID) 125 MCG tablet    Needle, Disp, (B-D BLUNT FILL NEEDLE) 18G X 1-1/2\" MISC    Needle, Disp, (BD DISP NEEDLE) 23G X 1\" MISC    potassium chloride ER (KLOR-CON M) 10 MEQ CR tablet    rosuvastatin (CRESTOR) 40 MG tablet    syringe, disposable, 1 ML MISC    testosterone cypionate (DEPOTESTOSTERONE) 200 MG/ML injection    vitamin B complex with vitamin C (STRESS TAB) tablet    acetaminophen (TYLENOL) 500 MG tablet    benzonatate (TESSALON) 100 MG capsule    Lidocaine (LIDOCARE) 4 % Patch     No current facility-administered medications for this visit.     Most Recent 3 CBC's:  Recent Labs   Lab Test 01/05/24  1418 12/15/23  1329 11/16/23  0926   WBC 4.8 6.4 4.2   HGB 11.7* 12.7* 13.1*   MCV 91 92 92 "    324 248   ANEUTAUTO 2.5 3.8 2.0     Most Recent 3 BMP's:  Recent Labs   Lab Test 12/15/23  1329 11/16/23  0926 11/15/23  0940    136 139   POTASSIUM 3.8 4.1 3.8   CHLORIDE 105 106 107   CO2 24 22 23   BUN 9.4 8.5 10.5   CR 1.14 1.13 1.10   ANIONGAP 10 8 9   HARDEEP 8.7 8.4* 8.4*   GLC 98 96 101*   PROTTOTAL 6.5 6.4 6.2*   ALBUMIN 4.4 4.3 4.2    Most Recent 3 LFT's:  Recent Labs   Lab Test 12/15/23  1329 11/16/23  0926 11/15/23  0940   AST 27 34 32   ALT 13 20 19   ALKPHOS 60 57 54   BILITOTAL 0.5 0.4 0.4    Most Recent 2 TSH and T4:  Recent Labs   Lab Test 06/26/23  1442 12/19/22  1103 06/17/22  1332 11/14/21  1103 10/20/21  1145   TSH 0.58 0.93 0.07*   < > 0.70   T4  --   --  1.36  --  0.97    < > = values in this interval not displayed.     I reviewed the above labs today.    Assessment and Plan  Multiple myeloma with high risk cytogenetics  - Miracle-RVD with VGPR, then underwent autologous transplant 11/11/21.   - Given his high risk cytogenetics he started miracle + revlimid maintenance therapy 2/21/22.  - Completed 2 years of zometa in Sept 2023. Continue Vit D.   - Revlimid was decreased to 21/28 days in Nov 2023 to try to minimize side effects (dysgeusia, flu-like symptoms on following daratumumab)  - Continue miracle + revlimid  - Continue acyclovir daily for viral ppx and bactrim M/T for PJP ppx.   - Received influenza vaccine 12/15/23  - COVID + 10/19/23.  Due for COVID booster after 1/19/24  - Pneumococcal vaccine due in February 2024    Hypogammaglobulinemia   Start monthly IV Ig if IgG <300  - 11/16/23 IgG 438  - 1/5/24 IgG pending    Normocytic Anemia:   - Likely d/t revlimid and daratumumab.   - Will keep monitoring and dose reduce if needed.      Elevated BP:    -Previously discussed his elevated BP in clinic and recommended following with his PCP.    - He has been taking his BP at home and they are within a normal range.    Plan from today's clinical encounter      20 minutes spent on the date  of the encounter doing chart review, review of test results, interpretation of tests, patient visit, and documentation     Brittni BRAMBILA, ANP-BC, AOCNP  [facility] [address] [address] [phone] (pager)

## 2024-01-05 NOTE — LETTER
1/5/2024         RE: June Ronquillo  [address]  [facility] [address]        Dear Colleague,    Thank you for referring your patient, June Ronquillo, to the [facility]. Please see a copy of my visit note below.    [facility]  Date of visit: 01/08/2024        Oncologic History  - 4/30/2021 M spike of 34.8 in urine.M spike in blood 0.2; IgG 702 with depressed IgA and IgM. Beta 2 microglobulin 2.7. Lambda  with K/L ratio of 0.01. WBC 11.1 with absolute eos of 1.0. hgb, plt, Cr (1.1), and albumin WNL.    -4/30/2021 CT abd/pelvis showed sclerotic marrow changes with numerous lytic appearing lesions throughout the imaged portions of the spine, pelvis and ribs.      -PET scan 5/11/2021 Numerous osteolytic lesions which predominantly demonstrate uptake below liver background levels. There is one intraosseous lesion in the left lateral 9th rib that demonstrates uptake above background, possibly due to nondisplaced fracture. Adjacent to this lesion is mild hypermetabolism to the left pleura, with new small left pleural effusion, suspicious for malignant effusion versus posttraumatic effusion. Pleural uptake may represent extramedullary myeloma extending along the intercostal space versus inflammation.    - BM bx 5/17/2021 with flow showing 4.0% plasma cells which express CD19 (dim), CD38 (bright), CD45 (dim), , and monotypic cytoplasmic lambda immunoglobulin light chains but lack CD20 and CD56.  Hypercellular bone marrow for age (approximately 75% cellularity) with adequate trilineage hematopoiesis. Plasma cell infiltrate: Interstitial, lambda monotypic and representing approximately 60% the bone marrow cellularity, by  immunohistochemical stain. Cytpgenetics with 2 related hypodiploid clones. One with loss of Y, monosomy 13 and 14 (5%) and one with translocation of 8p and 14, derivative 16 with long arm replaced by extra  copy 1q,monosomy 21. FISH showed gain of 1q, loss of 13 and 14, IGH-MYC fusion t(8:14)    - Started Miracle-RVd 21 day with velcade on days 1,8,15.     -Cycle 2 Miracle-RVd. Dose reduce dex to 80 mg d/t fatigue and stomach upset on dex days. Also having cough with basilar streaking on CXR    - 8/31/2021 BM bx -rare suspicious plasma cells in touch imprint. No increase in plasma cells by morph.    -8/31/2021 PET/CT Diffuse osteolytic lesions throughout the axial and appendicular skeleton. Increased sclerosis since prior exam 5/11/2021 without increased metabolism or size.  Hypermetabolic pretracheal lymph node as well as hypermetabolic level 2 lymph nodes within the right neck. These lymph nodes are likely reactive from autoimmune activation via medical therapy. Hypermetabolic subcentimeter nodules within the thyroid gland    - 11/11/2021 Autologous BMT    - 2/21/22 started maintenance with Revlimid 10 mg and daratumumab monthly; did Revlimid alone for C1 due to low IgG levels, added daratumumab starting C2.  Initially did Revlimid daily, decreased to 21/28 days in Nov 2023 to help minimize side effects    Interval Hx:   June presents toxicity  evaluation prior to receiving miracle+Revlimid maintenance  today.  Doing well.  Traveling for work.Takes infectious precautions (wears mask onplanes).  He is eating and drinking without issue and good appetite.    Intermittent numbness in the L hand,   Denies fevers/chills, SOB/cough, chest pain, bleeding issues,  N/V,  constipation, rashes/sores, night sweats, new pains, new lumps/bumps. edema, mouth sores.    Physical Exam:    /82 (BP Location: Right arm, Patient Position: Sitting, Cuff Size: Adult Regular)   Pulse 74   Temp 97.8  F (36.6  C) (Oral)   Resp 16   Wt 68.3 kg (150 lb 8 oz)   SpO2 100%   BMI 25.04 kg/m      Wt Readings from Last 5 Encounters:   01/05/24 68.3 kg (150 lb 8 oz)   12/15/23 66.5 kg (146 lb 9.6 oz)   12/14/23 68 kg (150 lb)   11/24/23 67.3  "kg (148 lb 6.4 oz)   11/16/23 67.1 kg (148 lb)       Constitutional: Appears stated age, well-groomed.  Accompanied by self. In no apparent distress.   HEENT: Normocephilic. EOMI, PERRL. Sclerae are anicteric. Oral mucosa is pink and moist with no lesions or thrush; gums normal and good dentition.   Respiratory: No increased work of breathing, good air exchange, clear to auscultation bilaterally, no crackles or wheezing.  Cardiovascular: Normal apical impulse, regular rate and rhythm, normal S1 and S2, and no murmur noted.  GI: +BS. Soft. No tenderness on palpation.  Lymph/Hematologic: No cervical lymphadenopathy and no supraclavicular lymphadenopathy.  Skin: No bruising or bleeding, normal skin color, texture, turgor, no redness, warmth, or swelling, no rashes, no lesions, no jaundice.  Extremities: No bilateral lower extremity edema.  Neurologic: Awake, alert, oriented to name, place and time.    Vascular access: None      Allergies   Allergen Reactions    Blood Transfusion Related (Informational Only) Other (See Comments)     Patient has a history of a clinically significant antibody against RBC antigens.  A delay in compatible RBCs may occur.      Current Outpatient Medications   Medication    acyclovir (ZOVIRAX) 400 MG tablet    albuterol (PROAIR HFA/PROVENTIL HFA/VENTOLIN HFA) 108 (90 Base) MCG/ACT inhaler    aspirin (ASA) 81 MG EC tablet    calcium carbonate-vitamin D (OSCAL) 500-5 MG-MCG tablet    LENalidomide (REVLIMID) 10 MG CAPS capsule    levothyroxine (SYNTHROID/LEVOTHROID) 125 MCG tablet    Needle, Disp, (B-D BLUNT FILL NEEDLE) 18G X 1-1/2\" MISC    Needle, Disp, (BD DISP NEEDLE) 23G X 1\" MISC    potassium chloride ER (KLOR-CON M) 10 MEQ CR tablet    rosuvastatin (CRESTOR) 40 MG tablet    syringe, disposable, 1 ML MISC    testosterone cypionate (DEPOTESTOSTERONE) 200 MG/ML injection    vitamin B complex with vitamin C (STRESS TAB) tablet    acetaminophen (TYLENOL) 500 MG tablet    benzonatate (TESSALON) " 100 MG capsule    Lidocaine (LIDOCARE) 4 % Patch     No current facility-administered medications for this visit.     Most Recent 3 CBC's:  Recent Labs   Lab Test 01/05/24  1418 12/15/23  1329 11/16/23  0926   WBC 4.8 6.4 4.2   HGB 11.7* 12.7* 13.1*   MCV 91 92 92    324 248   ANEUTAUTO 2.5 3.8 2.0     Most Recent 3 BMP's:  Recent Labs   Lab Test 12/15/23  1329 11/16/23  0926 11/15/23  0940    136 139   POTASSIUM 3.8 4.1 3.8   CHLORIDE 105 106 107   CO2 24 22 23   BUN 9.4 8.5 10.5   CR 1.14 1.13 1.10   ANIONGAP 10 8 9   HARDEEP 8.7 8.4* 8.4*   GLC 98 96 101*   PROTTOTAL 6.5 6.4 6.2*   ALBUMIN 4.4 4.3 4.2    Most Recent 3 LFT's:  Recent Labs   Lab Test 12/15/23  1329 11/16/23  0926 11/15/23  0940   AST 27 34 32   ALT 13 20 19   ALKPHOS 60 57 54   BILITOTAL 0.5 0.4 0.4    Most Recent 2 TSH and T4:  Recent Labs   Lab Test 06/26/23  1442 12/19/22  1103 06/17/22  1332 11/14/21  1103 10/20/21  1145   TSH 0.58 0.93 0.07*   < > 0.70   T4  --   --  1.36  --  0.97    < > = values in this interval not displayed.     I reviewed the above labs today.    Assessment and Plan  Multiple myeloma with high risk cytogenetics  - Miracle-RVD with VGPR, then underwent autologous transplant 11/11/21.   - Given his high risk cytogenetics he started miracle + revlimid maintenance therapy 2/21/22.  - Completed 2 years of zometa in Sept 2023. Continue Vit D.   - Revlimid was decreased to 21/28 days in Nov 2023 to try to minimize side effects (dysgeusia, flu-like symptoms on following daratumumab)  - Continue miracle + revlimid  - Continue acyclovir daily for viral ppx and bactrim M/T for PJP ppx.   - Received influenza vaccine 12/15/23  - COVID + 10/19/23.  Due for COVID booster after 1/19/24  - Pneumococcal vaccine due in February 2024    Hypogammaglobulinemia   Start monthly IV Ig if IgG <300  - 11/16/23 IgG 438  - 1/5/24 IgG pending    Normocytic Anemia:   - Likely d/t revlimid and daratumumab.   - Will keep monitoring and dose reduce if  needed.      Elevated BP:    -Previously discussed his elevated BP in clinic and recommended following with his PCP.    - He has been taking his BP at home and they are within a normal range.    Plan from today's clinical encounter      20 minutes spent on the date of the encounter doing chart review, review of test results, interpretation of tests, patient visit, and documentation     Brittni BRAMBILA, ANP-BC, AOCNP  [facility] [address] [id number] [phone] (pager)

## 2024-01-05 NOTE — PROGRESS NOTES
Infusion Nursing Note:  Kurtisdorianmatthias Ronquillo presents today for C26D1 Darzalex Faspro.    Patient seen by provider today: No   present during visit today: Not Applicable.    Note: Patient reports to feeling well with no new concerns to report.      Intravenous Access:  No Intravenous access/labs at this visit.    Treatment Conditions:  Lab Results   Component Value Date    HGB 11.7 (L) 01/05/2024    WBC 4.8 01/05/2024    ANEU 4.7 03/13/2023    ANEUTAUTO 2.5 01/05/2024     01/05/2024        Results reviewed, labs MET treatment parameters, ok to proceed with treatment.      Post Infusion Assessment:  Patient tolerated one Darzalex Faspro injection without incident to right abdomen.       Discharge Plan:   Patient declined prescription refills.  Discharge instructions reviewed with: Patient.  Patient and/or family verbalized understanding of discharge instructions and all questions answered.  AVS to patient via [employer].  Patient will return 2/2/24 labs, to see Laura Ballesteros and C27D1 Darzalex for next appointment.   Patient discharged in stable condition accompanied by: self.  Departure Mode: Ambulatory.      Jacklyn Austin RN

## 2024-01-05 NOTE — NURSING NOTE
Chief Complaint   Patient presents with    Blood Draw     Labs drawn via  by RN in lab     Labs collected from venipuncture by RN. Vitals taken. Checked in for appointment(s).     July Pope RN

## 2024-01-05 NOTE — NURSING NOTE
"Oncology Rooming Note    January 5, 2024 2:31 PM   June Ronquillo is a 53 year old male who presents for:    Chief Complaint   Patient presents with    Blood Draw     Labs drawn via  by RN in lab    Oncology Clinic Visit     Multiple myeloma      Initial Vitals: /82 (BP Location: Right arm, Patient Position: Sitting, Cuff Size: Adult Regular)   Pulse 74   Temp 97.8  F (36.6  C) (Oral)   Resp 16   Wt 68.3 kg (150 lb 8 oz)   SpO2 100%   BMI 25.04 kg/m   Estimated body mass index is 25.04 kg/m  as calculated from the following:    Height as of 12/14/23: 1.651 m (5' 5\").    Weight as of this encounter: 68.3 kg (150 lb 8 oz). Body surface area is 1.77 meters squared.  No Pain (0) Comment: Data Unavailable   No LMP for male patient.  Allergies reviewed: Yes  Medications reviewed: Yes    Medications: Medication refills not needed today.  Pharmacy name entered into [employer]:    [facility] [place], [place] - [address]  [facility] - [place], [place] - [address] [facility] [place] [place] [address]  [facility] [place], [place] - [address] [facility]  [facility] - [place], [place] [address]    Frailty Screening:   Is the patient here for a new oncology consult visit in cancer care? 2. No      Clinical concerns:        Ivana Espinoza              "

## 2024-01-08 ENCOUNTER — LAB (OUTPATIENT)
Dept: LAB | Facility: CLINIC | Age: 54
End: 2024-01-08
Payer: COMMERCIAL

## 2024-01-08 ENCOUNTER — TELEPHONE (OUTPATIENT)
Dept: ONCOLOGY | Facility: CLINIC | Age: 54
End: 2024-01-08

## 2024-01-08 DIAGNOSIS — R79.89 LOW TESTOSTERONE IN MALE: ICD-10-CM

## 2024-01-08 DIAGNOSIS — C73 PAPILLARY MICROCARCINOMA OF THYROID (H): ICD-10-CM

## 2024-01-08 DIAGNOSIS — E06.3 HYPOTHYROIDISM DUE TO HASHIMOTO'S THYROIDITIS: ICD-10-CM

## 2024-01-08 LAB
ALBUMIN SERPL ELPH-MCNC: 4.1 G/DL (ref 3.7–5.1)
ALPHA1 GLOB SERPL ELPH-MCNC: 0.3 G/DL (ref 0.2–0.4)
ALPHA2 GLOB SERPL ELPH-MCNC: 0.6 G/DL (ref 0.5–0.9)
B-GLOBULIN SERPL ELPH-MCNC: 0.8 G/DL (ref 0.6–1)
GAMMA GLOB SERPL ELPH-MCNC: 0.5 G/DL (ref 0.7–1.6)
IGA SERPL-MCNC: 63 MG/DL (ref 84–499)
IGG SERPL-MCNC: 483 MG/DL (ref 610–1616)
IGM SERPL-MCNC: 20 MG/DL (ref 35–242)
KAPPA LC FREE SER-MCNC: 0.93 MG/DL (ref 0.33–1.94)
KAPPA LC FREE/LAMBDA FREE SER NEPH: 1.33 {RATIO} (ref 0.26–1.65)
LAMBDA LC FREE SERPL-MCNC: 0.7 MG/DL (ref 0.57–2.63)
M PROTEIN SERPL ELPH-MCNC: 0.1 G/DL
PROT PATTERN SERPL ELPH-IMP: ABNORMAL
PSA SERPL DL<=0.01 NG/ML-MCNC: 1.26 NG/ML (ref 0–3.5)
TSH SERPL DL<=0.005 MIU/L-ACNC: 0.86 UIU/ML (ref 0.3–4.2)

## 2024-01-08 PROCEDURE — 84403 ASSAY OF TOTAL TESTOSTERONE: CPT | Performed by: INTERNAL MEDICINE

## 2024-01-08 PROCEDURE — 84443 ASSAY THYROID STIM HORMONE: CPT | Performed by: PATHOLOGY

## 2024-01-08 PROCEDURE — 84153 ASSAY OF PSA TOTAL: CPT | Performed by: PATHOLOGY

## 2024-01-08 PROCEDURE — 99000 SPECIMEN HANDLING OFFICE-LAB: CPT | Performed by: PATHOLOGY

## 2024-01-08 PROCEDURE — 36415 COLL VENOUS BLD VENIPUNCTURE: CPT | Performed by: PATHOLOGY

## 2024-01-08 NOTE — TELEPHONE ENCOUNTER
Oral Chemotherapy Monitoring Program   Medication: Revlimid  Rx: 10mg PO daily on days 1 through 21 of 28 day cycle   Auth #: [id number]   Risk Category: Adult Male  Routine survey questions reviewed.   Rx to be Escribed to [facility]    Yu Goldberg  [facility]  Oncology Pharmacy Liaison   [email or web address]  [phone] (phone)  [phone] (fax)

## 2024-01-09 LAB — TESTOST SERPL-MCNC: 623 NG/DL (ref 240–950)

## 2024-01-10 ENCOUNTER — PATIENT OUTREACH (OUTPATIENT)
Dept: ONCOLOGY | Facility: CLINIC | Age: 54
End: 2024-01-10
Payer: COMMERCIAL

## 2024-01-10 NOTE — PROGRESS NOTES
[facility]: Cancer Care                                                                                          [facility] calls to inform us that lenalidomide (Generic) is not available at this time.  They will need a new prescription with the brand name Revlimid sent to their pharmacy for Logan's prescription.    Signature:  Christina Zarate RN

## 2024-01-17 LAB
ABC CLONOSEQ B-CELL CLONALITY (ID) RESULT: NORMAL
ABC DOMINANT SEQUENCES (B-CELL): 3
ABC TOTAL NUCLEATED CELLS (B-CELL): NORMAL

## 2024-01-18 ENCOUNTER — TELEPHONE (OUTPATIENT)
Dept: GASTROENTEROLOGY | Facility: CLINIC | Age: 54
End: 2024-01-18
Payer: COMMERCIAL

## 2024-01-18 NOTE — TELEPHONE ENCOUNTER
Attempted to contact patient in order to complete pre assessment questions.     No answer. Left message to return call to [phone] option 4      Procedure details:    Patient scheduled for Colonoscopy  on 2/1/24.     Arrival time: 0845. Procedure time 0945    Pre op exam needed? N/A    [facility] [address] [place] [address], 5[address] Floor, [place] [address]    Sedation type: Conscious sedation     Indication for procedure: Screening      Chart review:     Electronic implanted devices? No    Recent diagnosis of diverticulitis within the last 6 weeks? No    Diabetic? No    Diabetic medication HOLDING recommendations: (if applicable)  Oral diabetic medications: No  Diabetic injectables: No  Insulin: No      Medication review:    Anticoagulants? No    NSAIDS? No    Other medication HOLDING recommendations:  N/A      Prep for procedure:     Bowel prep recommendation: Standard Miralax  Due to: standard bowel prep.    Prep instructions sent via [employer].       Ema Stapleton RN  Endoscopy Procedure Pre Assessment RN

## 2024-01-19 NOTE — TELEPHONE ENCOUNTER
Pre assessment completed for upcoming procedure.   (Please see previous telephone encounter notes for complete details)    Patient  returned call.       Procedure details:    Arrival time and facility location reviewed.    Pre op exam needed? N/A    Designated  policy reviewed. Instructed to have someone stay 6 hours post procedure.     COVID policy reviewed.      Medication review:    Medications reviewed. Please see supporting documentation below. Holding recommendations discussed (if applicable).       Prep for procedure:     Patient stated they have already reviewed the bowel prep instructions and had no questions. NPO instructions reviewed.    Patient was instructed to call if any questions or concerns arise.       Additional information needed?  N/A      Patient verbalized understanding and had no questions or concerns at this time.      July Dos Santos RN  Endoscopy Procedure Pre Assessment RN  [phone] option 4

## 2024-01-24 ENCOUNTER — VIRTUAL VISIT (OUTPATIENT)
Dept: ENDOCRINOLOGY | Facility: CLINIC | Age: 54
End: 2024-01-24
Payer: COMMERCIAL

## 2024-01-24 ENCOUNTER — TELEPHONE (OUTPATIENT)
Dept: ENDOCRINOLOGY | Facility: CLINIC | Age: 54
End: 2024-01-24

## 2024-01-24 DIAGNOSIS — R79.89 LOW TESTOSTERONE IN MALE: Primary | ICD-10-CM

## 2024-01-24 DIAGNOSIS — C73 PAPILLARY MICROCARCINOMA OF THYROID (H): ICD-10-CM

## 2024-01-24 DIAGNOSIS — E06.3 HYPOTHYROIDISM DUE TO HASHIMOTO'S THYROIDITIS: ICD-10-CM

## 2024-01-24 PROCEDURE — 99214 OFFICE O/P EST MOD 30 MIN: CPT | Mod: 95 | Performed by: INTERNAL MEDICINE

## 2024-01-24 RX ORDER — LEVOTHYROXINE SODIUM 125 UG/1
125 TABLET ORAL DAILY
Qty: 90 TABLET | Refills: 1 | Status: SHIPPED | OUTPATIENT
Start: 2024-01-24 | End: 2024-08-05 | Stop reason: DRUGHIGH

## 2024-01-24 ASSESSMENT — ENCOUNTER SYMPTOMS
SHORTNESS OF BREATH: 0
SINUS PAIN: 1
WHEEZING: 0
EYE WATERING: 0
TROUBLE SWALLOWING: 0
EYE IRRITATION: 0
TASTE DISTURBANCE: 1
COUGH DISTURBING SLEEP: 1
SPUTUM PRODUCTION: 1
POSTURAL DYSPNEA: 0
EYE REDNESS: 0
SINUS CONGESTION: 1
HOARSE VOICE: 0
NECK MASS: 0
DOUBLE VISION: 0
SNORES LOUDLY: 0
SORE THROAT: 0
SMELL DISTURBANCE: 1
HEMOPTYSIS: 0
DYSPNEA ON EXERTION: 0
EYE PAIN: 0
COUGH: 1

## 2024-01-24 NOTE — PATIENT INSTRUCTIONS
-Adjust calcium supplement to 500 mg twice daily with meals, lunch and dinner (calcium needs to be  from levothyroxine by at least 4 hours)  -Otherwise, continue current medications without changes  -Labs in 6 months  -Follow-up at the end of December 2024, with labs and neck ultrasound performed prior to visit (neck ultrasound to be completed at the [facility] in [place])  -We will communicate results via [employer], or if needed by phone

## 2024-01-24 NOTE — LETTER
1/24/2024         RE: June Ronquillo  [address]  [place] [id number]        Dear Colleague,    Thank you for referring your patient, June Ronquillo, to the [facility]. Please see a copy of my visit note below.      ENDOCRINOLOGY VIDEO FOLLOW-UP         HISTORY OF PRESENT ILLNESS    June Ronquillo is seen for follow-up via billable video visit.    1.  Hypogonadism.  After testicular ultrasound showed/benign findings, forward with testosterone teaching and initiation.    Logan has been taking testosterone 150 mg IM every 2 weeks since around 10/2023.    Usually takes injections every other Monday.  No issues with injection site pain.    Fatigue has improved significantly, as has libido, with initiation of testosterone.    2.  Thyroid cancer.  No change in the feel of his neck.    Neck ultrasound performed prior to this visit on 12/5/2023 shows lymph nodes which do not have clearly suspicious features.    PET/CT on 11/15/2023 showed moderately avid uptake in bilateral cervical lymph nodes which otherwise did not have suspicious features: This was favored to be reactive.    Patient notes that he typically has URI symptoms in the fall and it has been the case this year also.  Symptoms perhaps linger slightly longer in the past few years.  He notes that PET/CT was done around the time URI symptoms were more prominent.    3.  Hypothyroidism.  On levothyroxine 125 mcg daily.    4.  Bone health.  Takes calcium supplement, unknown dose once daily.  Does not take separate vitamin D supplement.    DXA scan performed in 11/2023 showed density.    Pertinent endocrine and related history:  1. Hypothyroidism. Diagnosed in 1/2021, attributed to Hashimoto's thyroiditis.    2. PTC. During imaging for evaluation of multiple myeloma, he was incidentally discovered to have increased FDG uptake in thyroid nodules in 8/2021 and in 10/2021, with findings as below:  - PET/CT 8/31/2021:  Hypermetabolic cervical lymph nodes, 3 separate hypermetabolic foci in the thyroid gland (2 in the left lobe, 1 in the right lobe).  CT appearance of nodules was hypoattenuating with a bulky calcification in one of the left thyroid lobe nodules.  SUV max of thyroid lesions 4.95.  - PET/CT 10/15/2021: Previous mildly enlarged FDG avid right upper cervical lymph nodes were resolved, likely inflammatory.  Few tiny thyroid nodules, similar in size, with decreased (now at most mild) uptake (SUV max 2.5).  -He does not have history of excessive head or neck radiation exposure.  No family history of thyroid disease/thyroid cancer.  -After initial visit in 10/2021, I referred patient for thyroid US which was performed on 10/28/2021. This showed bilateral thyroid nodules and heterogenous thyroid parenchyma.  -He had FNA of left lobe nodule performed on 11/2/2021. Cytology returned consistent with papillary thyroid carcinoma.  -Detailed neck US performed 11/8/2021 showed no suspicious appearing cervical nodes.  -Underwent total thyroidectomy with left selective neck dissection to include levels 6 and 7 on 5/3/2022 with Dr. Sepulveda.  Surgical pathology was as follows:  A.  THYROID GLAND, TOTAL THYROIDECTOMY:  -PAPILLARY THYROID MICROCARCINOMA, conventional/classic type, 0.6 cm in greatest dimension.  -Tumor is located in the mid left lobe, encapsulated, and limited to thyroid gland (no extrathyroidal extension identified).  -Margins are negative for tumor (closest is anterior left lobe surface at 1 mm).  -Benign adenomatoid and colloid nodules up to 0.6 cm in greatest dimension.  -Chronic lymphocytic thyroiditis.  -Biopsy site changes.  -Fragments of parathyroid tissue with normal cellularity (adjacent to mid/lower right lobe).  -One perithyroid lymph node, negative for malignancy (0/1).  -AJCC pathologic staging is pT1a N0a.  -See comment and synoptic report.     B.  LEFT LEVEL 6 AND 7 NODE, EXCISION:  -Parathyroid gland  "with normal cellularity.  -No lymph node identified.  -Benign fibroadipose tissue.    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    3. Multiple myeloma. Diagnosed in 4/2021.  He was initiated on treatment with daratumumab, Velcade, Revlimid and dexamethasone.  S/p autologous PBSCT.   4. High risk for diabetes.  5.  Hypogonadism.  His total testosterone has been low, free testosterone at the very low end of normal while gonadotropins have been elevated in a pattern suspicious for compensated hypogonadism, perhaps related to chemotherapy. Unremarkable testicular ultrasound    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    PAST MEDICAL HISTORY  Past Medical History:   Diagnosis Date     CAD (coronary artery disease)     s/p stent to CFX     Heart attack (H)      Hyperlipidemia LDL goal <100      Hypertension      Stented coronary artery      Thyroid disease        MEDICATIONS  Current Outpatient Medications   Medication Sig Dispense Refill     acyclovir (ZOVIRAX) 400 MG tablet Take 1 tablet (400 mg) by mouth 2 times daily 60 tablet 3     albuterol (PROAIR HFA/PROVENTIL HFA/VENTOLIN HFA) 108 (90 Base) MCG/ACT inhaler Inhale 2 puffs into the lungs 4 times daily 18 g 0     aspirin (ASA) 81 MG EC tablet Take 1 tablet (81 mg) by mouth daily       calcium carbonate-vitamin D (OSCAL) 500-5 MG-MCG tablet TAKE ONE TABLET BY MOUTH ONCE DAILY 90 tablet 3     LENalidomide (REVLIMID) 10 MG CAPS capsule Take 1 capsule (10 mg) by mouth daily 21 capsule 0     levothyroxine (SYNTHROID/LEVOTHROID) 125 MCG tablet Take 1 tablet (125 mcg) by mouth daily 90 tablet 1     Needle, Disp, (B-D BLUNT FILL NEEDLE) 18G X 1-1/2\" MISC For use with testosterone injection: Disposable syringe and needles, use one 18 G needle to draw up medication then switch to 23 G injection needle 25 each 1     Needle, Disp, (BD DISP NEEDLE) 23G X 1\" MISC For use with testosterone " injection: Disposable syringe and needles, use one 18 G needle to draw up medication then switch to 23 G injection needle 25 each 1     potassium chloride ER (KLOR-CON M) 10 MEQ CR tablet Take 2 tablets (20 mEq) by mouth daily 60 tablet 3     rosuvastatin (CRESTOR) 40 MG tablet Take 1 tablet (40 mg) by mouth daily 90 tablet 3     syringe, disposable, 1 ML MISC For use with testosterone injection: Disposable syringe and 2 needles, use one 18 G needle to draw up medication then switch to 23 G injection needle 25 each 1     testosterone cypionate (DEPOTESTOSTERONE) 200 MG/ML injection Inject 0.75 mLs (150 mg) into the muscle every 14 days 2 mL 5     vitamin B complex with vitamin C (STRESS TAB) tablet Take 1 tablet by mouth daily 90 tablet 3       Allergies, family, and social history were reviewed and documented as needed in EHR.     REVIEW OF SYSTEMS  A focused ROS was performed, with pertinent positives and negatives as noted in the HPI.    PHYSICAL EXAM  There were no vitals taken for this visit.  There is no height or weight on file to calculate BMI.  Constitutional: Patient is alert, oriented and appears in no acute distress.  Eyes: Eyes grossly normal to inspection, EOMI, no stare, lid lag, or retraction; no conjunctival injection.  ENMT: Lips are without lesions.   Neck: No visible goiter or neck mass.  Respiratory: No audible wheeze or cough. No visible cyanosis. No visible increased work of breathing.  Neurological: Alert and oriented times 3.  Cranial nerves grossly intact.        DATA REVIEW  Each of the following laboratory and/or imaging studies were reviewed.        Narrative & Impression   EXAMINATION: US HEAD NECK SOFT TISSUE, 12/5/2023 3:47 PM      COMPARISON: 11/8/2021 ultrasound, 11/15/2023 PET/CT     HISTORY: Papillary thyroid microcarcinoma, status post thyroidectomy.   Detailed neck ultrasound to assess for suspicious/abnormal cervical  lymph nodes; Papillary microcarcinoma of thyroid (H);  Hypothyroidism  due to Hashimoto's thyroiditis; Hypothyroidism due to Hashimoto's  thyroiditis     FINDINGS:   Lymph nodes are measured bilaterally with measurements given in  craniocaudal, transverse and AP dimensions as follows:     Right:  Level 2: Morphologically benign-appearing lymph nodes with fatty thiaog.  Level 3: No measurable lymph nodes.  Level 4: No measurable lymph nodes.  Level 5: No measurable lymph nodes.  Level 6: 0.7 x 0.3 x 0.7 cm ovoid hypoechoic focus with suggestion of  a fatty hilum, favored to represent a lymph node.  Level 7: No measurable lymph nodes.     Left:  Level 2: No measurable lymph nodes.  Level 3: No measurable lymph nodes.  Level 4: No measurable lymph nodes.  Level 5: 0.4 x 0.5 x 0.6 cm ovoid hypoechoic lymph node without  appreciable fatty hilum, unchanged since prior.  Level 6: No measurable lymph nodes.  Level 7: No measurable lymph nodes.                                                                      IMPRESSION:  Soft tissue neck ultrasound with lymph node measurements as described  above. No highly suspicious lymph nodes.          Component      Latest Ref Rng 1/5/2024  2:18 PM 1/8/2024  7:13 AM   WBC      4.0 - 11.0 10e3/uL 4.8     RBC Count      4.40 - 5.90 10e6/uL 3.95 (L)     Hemoglobin      13.3 - 17.7 g/dL 11.7 (L)     Hematocrit      40.0 - 53.0 % 35.9 (L)     MCV      78 - 100 fL 91     MCH      26.5 - 33.0 pg 29.6     MCHC      31.5 - 36.5 g/dL 32.6     RDW      10.0 - 15.0 % 15.4 (H)     Platelet Count      150 - 450 10e3/uL 363     % Neutrophils      % 52     % Lymphocytes      % 28     % Monocytes      % 15     % Eosinophils      % 4     % Basophils      % 1     % Immature Granulocytes      % 0     NRBCs per 100 WBC      <1 /100 0     Absolute Neutrophils      1.6 - 8.3 10e3/uL 2.5     Absolute Lymphocytes      0.8 - 5.3 10e3/uL 1.3     Absolute Monocytes      0.0 - 1.3 10e3/uL 0.7     Absolute Eosinophils      0.0 - 0.7 10e3/uL 0.2     Absolute  Basophils      0.0 - 0.2 10e3/uL 0.1     Absolute Immature Granulocytes      <=0.4 10e3/uL 0.0     Absolute NRBCs      10e3/uL 0.0     TSH      0.30 - 4.20 uIU/mL  0.86    PSA Tumor Marker      0.00 - 3.50 ng/mL  1.26    Testosterone Total      240 - 950 ng/dL  623       Legend:  (L) Low  (H) High      PET CT as noted in HPI.    ASSESSMENT  1.  Elevated gonadotropins.  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  Normal testicular ultrasound.  On testosterone, with improvement in hypogonadal symptoms and normal testosterone on previsit labs.  CBC shows anemia, no erythrocytosis.  PSA is in normal range.  Continue current regimen without changes.    2.  Papillary thyroid microcarcinoma.  Status post total thyroidectomy and left selective neck dissection.  T1a NX MX.  Surgical pathology indicates low risk of recurrence/persistent disease based on KRISTEN risk stratification, therefore radioiodine therapy has been deferred to date.  PET CT of the neck in 11/2023 shows FDG uptake, possibly reactive; no clearly abnormal/suspicious lymphadenopathy on neck ultrasound in 12/2023.  Thyroglobulin panel in 6/2022 showed negative thyroglobulin antibodies with thyroglobulin level of less than 0.1 ng/mL; thyroglobulin panel in 6/2023 showed negative thyroglobulin antibodies and thyroglobulin level less than 0.1 ng/mL.  Chest CT performed in 4/2023 (for abnormal PFT) does not show suspicious findings.  Based on these findings, recommend careful follow-up with thyroid hormone suppression as below.    3.  Hypothyroidism.  Diagnosed in 1/2021, preceding thyroidectomy and likely due to Hashimoto's thyroiditis.  Given response to treatment (with low thyroglobulin level and no suspicious nodes on ultrasound) and given low KRISTEN risk status, our TSH goal is between lower limit of normal and 2.  TSH drawn prior to this  visit is in ideal range.  Continue levothyroxine.    4.  Multiple myeloma.  Following in oncology.  Status post chemotherapy and autologous PBSCT. On maintenance therapy and monthly Zometa.    5.  High risk for diabetes.  A1c 6/2023 range.    6.  Bone health.  DXA scan performed on 11/10/2023 showed low bone density.  FRAX calculated 10-year risk for fracture is 9.8% osteoporotic fracture, 1.2% for hip fracture in the next 10 years.  On calcium supplementation (we reviewed optimal calcium intake), with normal vitamin D in 6/2023.      PLAN  -Adjust calcium supplement to 500 mg twice daily with meals, lunch and dinner (calcium needs to be  from levothyroxine by at least 4 hours)  -Otherwise, continue current medications without changes  -Labs in 6 months  -Follow-up at the end of December 2024, with labs and neck ultrasound performed prior to visit ([place] [facility] [place]  -We will communicate results via [facility], or if needed by phone      Orders Placed This Encounter   Procedures     US Head Neck Soft Tissue     TSH with free T4 reflex     Thyroglobulin and Antibody (Sendout to [facility])     Vitamin D Deficiency     Comprehensive metabolic panel     Video start time: 1:56 PM  Video end time: 2:07 PM    Physician location: On site    Video platform: [employer]    I spent a total of 37 minutes on the date of encounter reviewing medical records, evaluating the patient, coordinating care and documenting in the EHR, as detailed above.        Deejay Elder MD   Division of Diabetes, Endocrinology and Metabolism  Department of Medicine          Again, thank you for allowing me to participate in the care of your patient.        Sincerely,        DEEJAY Elder MD

## 2024-01-24 NOTE — TELEPHONE ENCOUNTER
[facility] Call Center    Phone Message    May a detailed message be left on voicemail: yes     Reason for Call: Other: Patient calling because he received a call he missed from Dr. Edler's nurse Liz and was calling back because he is not sure what he is supposed to do. Please give him a call back to discuss.       Action Taken: Other: Endo    Travel Screening: Not Applicable

## 2024-01-24 NOTE — PROGRESS NOTES
ENDOCRINOLOGY VIDEO FOLLOW-UP         HISTORY OF PRESENT ILLNESS    June Ronquillo is seen for follow-up via billable video visit.    1.  Hypogonadism.  After testicular ultrasound showed/benign findings, forward with testosterone teaching and initiation.    Logan has been taking testosterone 150 mg IM every 2 weeks since around 10/2023.    Usually takes injections every other Monday.  No issues with injection site pain.    Fatigue has improved significantly, as has libido, with initiation of testosterone.    2.  Thyroid cancer.  No change in the feel of his neck.    Neck ultrasound performed prior to this visit on 12/5/2023 shows lymph nodes which do not have clearly suspicious features.    PET/CT on 11/15/2023 showed moderately avid uptake in bilateral cervical lymph nodes which otherwise did not have suspicious features: This was favored to be reactive.    Patient notes that he typically has URI symptoms in the fall and it has been the case this year also.  Symptoms perhaps linger slightly longer in the past few years.  He notes that PET/CT was done around the time URI symptoms were more prominent.    3.  Hypothyroidism.  On levothyroxine 125 mcg daily.    4.  Bone health.  Takes calcium supplement, unknown dose once daily.  Does not take separate vitamin D supplement.    DXA scan performed in 11/2023 showed density.    Pertinent endocrine and related history:  1. Hypothyroidism. Diagnosed in 1/2021, attributed to Hashimoto's thyroiditis.    2. PTC. During imaging for evaluation of multiple myeloma, he was incidentally discovered to have increased FDG uptake in thyroid nodules in 8/2021 and in 10/2021, with findings as below:  - PET/CT 8/31/2021: Hypermetabolic cervical lymph nodes, 3 separate hypermetabolic foci in the thyroid gland (2 in the left lobe, 1 in the right lobe).  CT appearance of nodules was hypoattenuating with a bulky calcification in one of the left thyroid lobe nodules.  SUV max of thyroid  lesions 4.95.  - PET/CT 10/15/2021: Previous mildly enlarged FDG avid right upper cervical lymph nodes were resolved, likely inflammatory.  Few tiny thyroid nodules, similar in size, with decreased (now at most mild) uptake (SUV max 2.5).  -He does not have history of excessive head or neck radiation exposure.  No family history of thyroid disease/thyroid cancer.  -After initial visit in 10/2021, I referred patient for thyroid US which was performed on 10/28/2021. This showed bilateral thyroid nodules and heterogenous thyroid parenchyma.  -He had FNA of left lobe nodule performed on 11/2/2021. Cytology returned consistent with papillary thyroid carcinoma.  -Detailed neck US performed 11/8/2021 showed no suspicious appearing cervical nodes.  -Underwent total thyroidectomy with left selective neck dissection to include levels 6 and 7 on 5/3/2022 with Dr. Sepulveda.  Surgical pathology was as follows:  A.  THYROID GLAND, TOTAL THYROIDECTOMY:  -PAPILLARY THYROID MICROCARCINOMA, conventional/classic type, 0.6 cm in greatest dimension.  -Tumor is located in the mid left lobe, encapsulated, and limited to thyroid gland (no extrathyroidal extension identified).  -Margins are negative for tumor (closest is anterior left lobe surface at 1 mm).  -Benign adenomatoid and colloid nodules up to 0.6 cm in greatest dimension.  -Chronic lymphocytic thyroiditis.  -Biopsy site changes.  -Fragments of parathyroid tissue with normal cellularity (adjacent to mid/lower right lobe).  -One perithyroid lymph node, negative for malignancy (0/1).  -AJCC pathologic staging is pT1a N0a.  -See comment and synoptic report.     B.  LEFT LEVEL 6 AND 7 NODE, EXCISION:  -Parathyroid gland with normal cellularity.  -No lymph node identified.  -Benign fibroadipose tissue.    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    3. Multiple myeloma. Diagnosed in 4/2021.  He was initiated on treatment with daratumumab, Velcade,  "Revlimid and dexamethasone.  S/p autologous PBSCT.   4. High risk for diabetes.  5.  Hypogonadism.  His total testosterone has been low, free testosterone at the very low end of normal while gonadotropins have been elevated in a pattern suspicious for compensated hypogonadism, perhaps related to chemotherapy. Unremarkable testicular ultrasound    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    PAST MEDICAL HISTORY  Past Medical History:   Diagnosis Date    CAD (coronary artery disease)     s/p stent to CFX    Heart attack (H)     Hyperlipidemia LDL goal <100     Hypertension     Stented coronary artery     Thyroid disease        MEDICATIONS  Current Outpatient Medications   Medication Sig Dispense Refill    acyclovir (ZOVIRAX) 400 MG tablet Take 1 tablet (400 mg) by mouth 2 times daily 60 tablet 3    albuterol (PROAIR HFA/PROVENTIL HFA/VENTOLIN HFA) 108 (90 Base) MCG/ACT inhaler Inhale 2 puffs into the lungs 4 times daily 18 g 0    aspirin (ASA) 81 MG EC tablet Take 1 tablet (81 mg) by mouth daily      calcium carbonate-vitamin D (OSCAL) 500-5 MG-MCG tablet TAKE ONE TABLET BY MOUTH ONCE DAILY 90 tablet 3    LENalidomide (REVLIMID) 10 MG CAPS capsule Take 1 capsule (10 mg) by mouth daily 21 capsule 0    levothyroxine (SYNTHROID/LEVOTHROID) 125 MCG tablet Take 1 tablet (125 mcg) by mouth daily 90 tablet 1    Needle, Disp, (B-D BLUNT FILL NEEDLE) 18G X 1-1/2\" MISC For use with testosterone injection: Disposable syringe and needles, use one 18 G needle to draw up medication then switch to 23 G injection needle 25 each 1    Needle, Disp, (BD DISP NEEDLE) 23G X 1\" MISC For use with testosterone injection: Disposable syringe and needles, use one 18 G needle to draw up medication then switch to 23 G injection needle 25 each 1    potassium chloride ER (KLOR-CON M) 10 MEQ CR tablet Take 2 tablets (20 mEq) by mouth daily 60 tablet 3    rosuvastatin (CRESTOR) 40 MG tablet Take 1 " tablet (40 mg) by mouth daily 90 tablet 3    syringe, disposable, 1 ML MISC For use with testosterone injection: Disposable syringe and 2 needles, use one 18 G needle to draw up medication then switch to 23 G injection needle 25 each 1    testosterone cypionate (DEPOTESTOSTERONE) 200 MG/ML injection Inject 0.75 mLs (150 mg) into the muscle every 14 days 2 mL 5    vitamin B complex with vitamin C (STRESS TAB) tablet Take 1 tablet by mouth daily 90 tablet 3       Allergies, family, and social history were reviewed and documented as needed in EHR.     REVIEW OF SYSTEMS  A focused ROS was performed, with pertinent positives and negatives as noted in the HPI.    PHYSICAL EXAM  There were no vitals taken for this visit.  There is no height or weight on file to calculate BMI.  Constitutional: Patient is alert, oriented and appears in no acute distress.  Eyes: Eyes grossly normal to inspection, EOMI, no stare, lid lag, or retraction; no conjunctival injection.  ENMT: Lips are without lesions.   Neck: No visible goiter or neck mass.  Respiratory: No audible wheeze or cough. No visible cyanosis. No visible increased work of breathing.  Neurological: Alert and oriented times 3.  Cranial nerves grossly intact.        DATA REVIEW  Each of the following laboratory and/or imaging studies were reviewed.        Narrative & Impression   EXAMINATION: US HEAD NECK SOFT TISSUE, 12/5/2023 3:47 PM      COMPARISON: 11/8/2021 ultrasound, 11/15/2023 PET/CT     HISTORY: Papillary thyroid microcarcinoma, status post thyroidectomy.   Detailed neck ultrasound to assess for suspicious/abnormal cervical  lymph nodes; Papillary microcarcinoma of thyroid (H); Hypothyroidism  due to Hashimoto's thyroiditis; Hypothyroidism due to Hashimoto's  thyroiditis     FINDINGS:   Lymph nodes are measured bilaterally with measurements given in  craniocaudal, transverse and AP dimensions as follows:     Right:  Level 2: Morphologically benign-appearing lymph  nodes with fatty thiago.  Level 3: No measurable lymph nodes.  Level 4: No measurable lymph nodes.  Level 5: No measurable lymph nodes.  Level 6: 0.7 x 0.3 x 0.7 cm ovoid hypoechoic focus with suggestion of  a fatty hilum, favored to represent a lymph node.  Level 7: No measurable lymph nodes.     Left:  Level 2: No measurable lymph nodes.  Level 3: No measurable lymph nodes.  Level 4: No measurable lymph nodes.  Level 5: 0.4 x 0.5 x 0.6 cm ovoid hypoechoic lymph node without  appreciable fatty hilum, unchanged since prior.  Level 6: No measurable lymph nodes.  Level 7: No measurable lymph nodes.                                                                      IMPRESSION:  Soft tissue neck ultrasound with lymph node measurements as described  above. No highly suspicious lymph nodes.          Component      Latest Ref Rng 1/5/2024  2:18 PM 1/8/2024  7:13 AM   WBC      4.0 - 11.0 10e3/uL 4.8     RBC Count      4.40 - 5.90 10e6/uL 3.95 (L)     Hemoglobin      13.3 - 17.7 g/dL 11.7 (L)     Hematocrit      40.0 - 53.0 % 35.9 (L)     MCV      78 - 100 fL 91     MCH      26.5 - 33.0 pg 29.6     MCHC      31.5 - 36.5 g/dL 32.6     RDW      10.0 - 15.0 % 15.4 (H)     Platelet Count      150 - 450 10e3/uL 363     % Neutrophils      % 52     % Lymphocytes      % 28     % Monocytes      % 15     % Eosinophils      % 4     % Basophils      % 1     % Immature Granulocytes      % 0     NRBCs per 100 WBC      <1 /100 0     Absolute Neutrophils      1.6 - 8.3 10e3/uL 2.5     Absolute Lymphocytes      0.8 - 5.3 10e3/uL 1.3     Absolute Monocytes      0.0 - 1.3 10e3/uL 0.7     Absolute Eosinophils      0.0 - 0.7 10e3/uL 0.2     Absolute Basophils      0.0 - 0.2 10e3/uL 0.1     Absolute Immature Granulocytes      <=0.4 10e3/uL 0.0     Absolute NRBCs      10e3/uL 0.0     TSH      0.30 - 4.20 uIU/mL  0.86    PSA Tumor Marker      0.00 - 3.50 ng/mL  1.26    Testosterone Total      240 - 950 ng/dL  623       Legend:  (L) Low  (H)  High      PET CT as noted in HPI.    ASSESSMENT  1.  Elevated gonadotropins.  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  Normal testicular ultrasound.  On testosterone, with improvement in hypogonadal symptoms and normal testosterone on previsit labs.  CBC shows anemia, no erythrocytosis.  PSA is in normal range.  Continue current regimen without changes.    2.  Papillary thyroid microcarcinoma.  Status post total thyroidectomy and left selective neck dissection.  T1a NX MX.  Surgical pathology indicates low risk of recurrence/persistent disease based on KRISTEN risk stratification, therefore radioiodine therapy has been deferred to date.  PET CT of the neck in 11/2023 shows FDG uptake, possibly reactive; no clearly abnormal/suspicious lymphadenopathy on neck ultrasound in 12/2023.  Thyroglobulin panel in 6/2022 showed negative thyroglobulin antibodies with thyroglobulin level of less than 0.1 ng/mL; thyroglobulin panel in 6/2023 showed negative thyroglobulin antibodies and thyroglobulin level less than 0.1 ng/mL.  Chest CT performed in 4/2023 (for abnormal PFT) does not show suspicious findings.  Based on these findings, recommend careful follow-up with thyroid hormone suppression as below.    3.  Hypothyroidism.  Diagnosed in 1/2021, preceding thyroidectomy and likely due to Hashimoto's thyroiditis.  Given response to treatment (with low thyroglobulin level and no suspicious nodes on ultrasound) and given low KRISTEN risk status, our TSH goal is between lower limit of normal and 2.  TSH drawn prior to this visit is in ideal range.  Continue levothyroxine.    4.  Multiple myeloma.  Following in oncology.  Status post chemotherapy and autologous PBSCT. On maintenance therapy and monthly Zometa.    5.  High risk for diabetes.  A1c 6/2023 range.    6.  Bone health.  DXA scan performed on 11/10/2023  showed low bone density.  FRAX calculated 10-year risk for fracture is 9.8% osteoporotic fracture, 1.2% for hip fracture in the next 10 years.  On calcium supplementation (we reviewed optimal calcium intake), with normal vitamin D in 6/2023.      PLAN  -Adjust calcium supplement to 500 mg twice daily with meals, lunch and dinner (calcium needs to be  from levothyroxine by at least 4 hours)  -Otherwise, continue current medications without changes  -Labs in 6 months  -Follow-up at the end of December 2024, with labs and neck ultrasound performed prior to visit (neck ultrasound to be completed at the [facility] in [place])  -We will communicate results via [email or web address], or if needed by phone      Orders Placed This Encounter   Procedures    US Head Neck Soft Tissue    TSH with free T4 reflex    Thyroglobulin and Antibody (Sendout to [facility])    Vitamin D Deficiency    Comprehensive metabolic panel     Video start time: 1:56 PM  Video end time: 2:07 PM    Physician location: On site    Video platform: [employer] spent a total of 37 minutes on the date of encounter reviewing medical records, evaluating the patient, coordinating care and documenting in the EHR, as detailed above.        Makeda Elder MD   Division of Diabetes, Endocrinology and Metabolism  Department of Medicine

## 2024-02-01 ENCOUNTER — HOSPITAL ENCOUNTER (OUTPATIENT)
Facility: AMBULATORY SURGERY CENTER | Age: 54
Discharge: HOME | End: 2024-02-01
Attending: INTERNAL MEDICINE | Admitting: INTERNAL MEDICINE
Payer: COMMERCIAL

## 2024-02-01 VITALS
WEIGHT: 145 LBS | SYSTOLIC BLOOD PRESSURE: 138 MMHG | TEMPERATURE: 97.5 F | DIASTOLIC BLOOD PRESSURE: 89 MMHG | BODY MASS INDEX: 23.3 KG/M2 | RESPIRATION RATE: 14 BRPM | OXYGEN SATURATION: 99 % | HEIGHT: 66 IN | HEART RATE: 85 BPM

## 2024-02-01 DIAGNOSIS — C90.00 MULTIPLE MYELOMA NOT HAVING ACHIEVED REMISSION (H): Primary | ICD-10-CM

## 2024-02-01 LAB — COLONOSCOPY: NORMAL

## 2024-02-01 PROCEDURE — 88342 IMHCHEM/IMCYTCHM 1ST ANTB: CPT | Mod: TC,XU | Performed by: INTERNAL MEDICINE

## 2024-02-01 PROCEDURE — 88305 TISSUE EXAM BY PATHOLOGIST: CPT | Mod: 26 | Performed by: PATHOLOGY

## 2024-02-01 PROCEDURE — 45380 COLONOSCOPY AND BIOPSY: CPT | Mod: 33 | Performed by: INTERNAL MEDICINE

## 2024-02-01 PROCEDURE — 88360 TUMOR IMMUNOHISTOCHEM/MANUAL: CPT | Mod: 26 | Performed by: PATHOLOGY

## 2024-02-01 PROCEDURE — 88341 IMHCHEM/IMCYTCHM EA ADD ANTB: CPT | Mod: 26 | Performed by: PATHOLOGY

## 2024-02-01 PROCEDURE — 88342 IMHCHEM/IMCYTCHM 1ST ANTB: CPT | Mod: 26 | Performed by: PATHOLOGY

## 2024-02-01 RX ORDER — NALOXONE HYDROCHLORIDE 0.4 MG/ML
0.4 INJECTION, SOLUTION INTRAMUSCULAR; INTRAVENOUS; SUBCUTANEOUS
Status: CANCELLED | OUTPATIENT
Start: 2024-02-01

## 2024-02-01 RX ORDER — NALOXONE HYDROCHLORIDE 0.4 MG/ML
0.2 INJECTION, SOLUTION INTRAMUSCULAR; INTRAVENOUS; SUBCUTANEOUS
Status: CANCELLED | OUTPATIENT
Start: 2024-02-01

## 2024-02-01 RX ORDER — LENALIDOMIDE 10 MG/1
10 CAPSULE ORAL DAILY
Qty: 21 CAPSULE | Refills: 0 | Status: SHIPPED | OUTPATIENT
Start: 2024-02-01 | End: 2024-03-07

## 2024-02-01 RX ORDER — FENTANYL CITRATE 50 UG/ML
INJECTION, SOLUTION INTRAMUSCULAR; INTRAVENOUS PRN
Status: DISCONTINUED | OUTPATIENT
Start: 2024-02-01 | End: 2024-02-01 | Stop reason: HOSPADM

## 2024-02-01 RX ORDER — PROCHLORPERAZINE MALEATE 10 MG
10 TABLET ORAL EVERY 6 HOURS PRN
Status: CANCELLED | OUTPATIENT
Start: 2024-02-01

## 2024-02-01 RX ORDER — FLUMAZENIL 0.1 MG/ML
0.2 INJECTION, SOLUTION INTRAVENOUS
Status: CANCELLED | OUTPATIENT
Start: 2024-02-01 | End: 2024-02-01

## 2024-02-01 RX ORDER — SODIUM CHLORIDE, SODIUM LACTATE, POTASSIUM CHLORIDE, CALCIUM CHLORIDE 600; 310; 30; 20 MG/100ML; MG/100ML; MG/100ML; MG/100ML
INJECTION, SOLUTION INTRAVENOUS CONTINUOUS
Status: DISCONTINUED | OUTPATIENT
Start: 2024-02-01 | End: 2024-02-02 | Stop reason: HOSPADM

## 2024-02-01 RX ORDER — ONDANSETRON 2 MG/ML
4 INJECTION INTRAMUSCULAR; INTRAVENOUS
Status: DISCONTINUED | OUTPATIENT
Start: 2024-02-01 | End: 2024-02-02 | Stop reason: HOSPADM

## 2024-02-01 RX ORDER — ONDANSETRON 4 MG/1
4 TABLET, ORALLY DISINTEGRATING ORAL EVERY 6 HOURS PRN
Status: CANCELLED | OUTPATIENT
Start: 2024-02-01

## 2024-02-01 RX ORDER — LIDOCAINE 40 MG/G
CREAM TOPICAL
Status: DISCONTINUED | OUTPATIENT
Start: 2024-02-01 | End: 2024-02-02 | Stop reason: HOSPADM

## 2024-02-01 RX ORDER — ONDANSETRON 2 MG/ML
4 INJECTION INTRAMUSCULAR; INTRAVENOUS EVERY 6 HOURS PRN
Status: CANCELLED | OUTPATIENT
Start: 2024-02-01

## 2024-02-01 NOTE — H&P
June ARGUELLES Yg  [id number]  male  53 year old      Reason for procedure/surgery: Colon cancer screening. Hx of BMT     Patient Active Problem List   Diagnosis    CARDIOVASCULAR SCREENING; LDL GOAL LESS THAN 100    FH: heart disease    Overweight (BMI 25.0-29.9)    Hyperlipidemia LDL goal <100    Concussion without loss of consciousness, initial encounter    Concussion without loss of consciousness, subsequent encounter    Hypertension    CAD (coronary artery disease)    Pre-diabetes    Increased thyroid stimulating hormone (TSH) level    Increased liver enzymes    Hepatitis A antibody positive    Hypothyroidism due to Hashimoto's thyroiditis    Multiple myeloma not having achieved remission (H)    Keratoconus, stable condition    Autologous donor, stem cells    Febrile neutropenia  (H24)    Neutropenic fever  (H24)    History of peripheral stem cell transplant (H)    Malignant neoplasm of thyroid gland (H)    Thrombocytopenia, unspecified (H24)    Hypogammaglobulinemia (H24)    Abdominal pain, right upper quadrant    Chills    Back pain     Past Surgical History:    Past Surgical History:   Procedure Laterality Date    BONE MARROW BIOPSY, BONE SPECIMEN, NEEDLE/TROCAR Left 5/17/2021    Procedure: BIOPSY, BONE MARROW with aspiration and ancillary studies;  Surgeon: Niharika Regalado MD;  Location: [facility] OR    BONE MARROW BIOPSY, BONE SPECIMEN, NEEDLE/TROCAR Left 10/14/2021    Procedure: BIOPSY, BONE MARROW;  Surgeon: Alexa Albert APRN CNP;  Location: [facility] OR    BONE MARROW BIOPSY, BONE SPECIMEN, NEEDLE/TROCAR Right 3/7/2022    Procedure: BIOPSY, BONE MARROW;  Surgeon: Deborah Carmona PA-C;  Location: [facility] OR    BONE MARROW BIOPSY, BONE SPECIMEN, NEEDLE/TROCAR N/A 5/9/2022    Procedure: BIOPSY, BONE MARROW;  Surgeon: Deborah Leblanc PA-C;  Location: [facility] OR    BONE MARROW BIOPSY, BONE SPECIMEN, NEEDLE/TROCAR Left 12/5/2022    Procedure: BIOPSY, BONE MARROW;  Surgeon: Alba Moses APRN CNP;   "Location: [facility] OR    BONE MARROW BIOPSY, BONE SPECIMEN, NEEDLE/TROCAR N/A 11/16/2023    Procedure: Bone marrow biopsy, bone specimen, needle/trocar;  Surgeon: Sneha Vance PA-C;  Location: [facility] OR    HEART CATH PTCA SINGLE VESSEL  10/10/2004    Stent to CFX - [facility]     INSERT CATHETER VASCULAR ACCESS Right 11/3/2021    Procedure: NON VALVED TUNNELED DUAL LUMEN APHARESIS CAPABLE LINE INSERTION, VASCULAR ACCESS CATHETER;  Surgeon: Werner Mcnamara MD;  Location: [facility] OR    IR CVC TUNNEL PLACEMENT > 5 YRS OF AGE  11/3/2021    IR CVC TUNNEL REMOVAL RIGHT  12/2/2021    THYROIDECTOMY N/A 5/3/2022    Procedure: total thyroidectomy, Left selective neck dissection level 6 and level 7 ;  Surgeon: Clarita Sepulveda MD;  Location: [facility] OR       Past Medical History:   Past Medical History:   Diagnosis Date    CAD (coronary artery disease)     s/p stent to CFX    Heart attack (H)     Hyperlipidemia LDL goal <100     Hypertension     Stented coronary artery     Thyroid disease        Social History:   Social History     Tobacco Use    Smoking status: Never    Smokeless tobacco: Never   Substance Use Topics    Alcohol use: Not Currently       Family History:   Family History   Problem Relation Age of Onset    Hypertension Mother     Hyperlipidemia Mother     Heart Disease Paternal Grandmother     Hyperlipidemia Sister     Hyperlipidemia Sister        Allergies:   Allergies   Allergen Reactions    Blood Transfusion Related (Informational Only) Other (See Comments)     Patient has a history of a clinically significant antibody against RBC antigens.  A delay in compatible RBCs may occur.        Active Medications:   Current Outpatient Medications   Medication Sig Dispense Refill    Needle, Disp, (B-D BLUNT FILL NEEDLE) 18G X 1-1/2\" MISC For use with testosterone injection: Disposable syringe and needles, use one 18 G needle to draw up medication then switch to 23 G injection needle 25 each 1    Needle, Disp, " "(BD DISP NEEDLE) 23G X 1\" MISC For use with testosterone injection: Disposable syringe and needles, use one 18 G needle to draw up medication then switch to 23 G injection needle 25 each 1    syringe, disposable, 1 ML MISC For use with testosterone injection: Disposable syringe and 2 needles, use one 18 G needle to draw up medication then switch to 23 G injection needle 25 each 1    acyclovir (ZOVIRAX) 400 MG tablet Take 1 tablet (400 mg) by mouth 2 times daily 60 tablet 3    albuterol (PROAIR HFA/PROVENTIL HFA/VENTOLIN HFA) 108 (90 Base) MCG/ACT inhaler Inhale 2 puffs into the lungs 4 times daily 18 g 0    aspirin (ASA) 81 MG EC tablet Take 1 tablet (81 mg) by mouth daily      calcium carbonate-vitamin D (OSCAL) 500-5 MG-MCG tablet TAKE ONE TABLET BY MOUTH ONCE DAILY 90 tablet 3    LENalidomide (REVLIMID) 10 MG CAPS capsule Take 1 capsule (10 mg) by mouth daily 21 capsule 0    levothyroxine (SYNTHROID/LEVOTHROID) 125 MCG tablet Take 1 tablet (125 mcg) by mouth daily 90 tablet 1    potassium chloride ER (KLOR-CON M) 10 MEQ CR tablet Take 2 tablets (20 mEq) by mouth daily 60 tablet 3    rosuvastatin (CRESTOR) 40 MG tablet Take 1 tablet (40 mg) by mouth daily 90 tablet 3    testosterone cypionate (DEPOTESTOSTERONE) 200 MG/ML injection Inject 0.75 mLs (150 mg) into the muscle every 14 days 2 mL 5    vitamin B complex with vitamin C (STRESS TAB) tablet Take 1 tablet by mouth daily 90 tablet 3       Systemic Review:   CONSTITUTIONAL: NEGATIVE for fever, chills, change in weight  ENT/MOUTH: NEGATIVE for ear, mouth and throat problems  RESP: NEGATIVE for significant cough or SOB  CV: NEGATIVE for chest pain, palpitations or peripheral edema    Physical Examination:   Vital Signs: BP (!) 135/97 (BP Location: Right arm)   Pulse 88   Temp 98.3  F (36.8  C)   Resp 18   Ht 1.676 m (5' 6\")   Wt 65.8 kg (145 lb)   SpO2 100%   BMI 23.40 kg/m    GENERAL: healthy, alert and no distress  NECK: no adenopathy, no asymmetry, " masses, or scars  RESP: lungs clear to auscultation - no rales, rhonchi or wheezes  CV: regular rate and rhythm, normal S1 S2, no S3 or S4, no murmur, click or rub, no peripheral edema and peripheral pulses strong  ABDOMEN: soft, nontender, no hepatosplenomegaly, no masses and bowel sounds normal  MS: no gross musculoskeletal defects noted, no edema    Plan: Appropriate to proceed as scheduled.      Candice Skinner MD  2/1/2024    PCP:  Naren Cherry

## 2024-02-02 ENCOUNTER — APPOINTMENT (OUTPATIENT)
Dept: LAB | Facility: CLINIC | Age: 54
End: 2024-02-02
Attending: STUDENT IN AN ORGANIZED HEALTH CARE EDUCATION/TRAINING PROGRAM
Payer: COMMERCIAL

## 2024-02-02 ENCOUNTER — ONCOLOGY VISIT (OUTPATIENT)
Dept: ONCOLOGY | Facility: CLINIC | Age: 54
End: 2024-02-02
Attending: STUDENT IN AN ORGANIZED HEALTH CARE EDUCATION/TRAINING PROGRAM
Payer: COMMERCIAL

## 2024-02-02 VITALS
BODY MASS INDEX: 24.36 KG/M2 | SYSTOLIC BLOOD PRESSURE: 153 MMHG | RESPIRATION RATE: 16 BRPM | WEIGHT: 150.9 LBS | OXYGEN SATURATION: 100 % | DIASTOLIC BLOOD PRESSURE: 88 MMHG | TEMPERATURE: 98.1 F | HEART RATE: 83 BPM

## 2024-02-02 DIAGNOSIS — C90.00 MULTIPLE MYELOMA NOT HAVING ACHIEVED REMISSION (H): Primary | ICD-10-CM

## 2024-02-02 DIAGNOSIS — Z52.011 AUTOLOGOUS DONOR, STEM CELLS: ICD-10-CM

## 2024-02-02 DIAGNOSIS — B34.8 PARAINFLUENZA: ICD-10-CM

## 2024-02-02 LAB
ALBUMIN SERPL BCG-MCNC: 4.4 G/DL (ref 3.5–5.2)
ALP SERPL-CCNC: 59 U/L (ref 40–150)
ALT SERPL W P-5'-P-CCNC: 15 U/L (ref 0–70)
ANION GAP SERPL CALCULATED.3IONS-SCNC: 9 MMOL/L (ref 7–15)
AST SERPL W P-5'-P-CCNC: 24 U/L (ref 0–45)
BASOPHILS # BLD AUTO: 0.1 10E3/UL (ref 0–0.2)
BASOPHILS NFR BLD AUTO: 1 %
BILIRUB SERPL-MCNC: 0.4 MG/DL
BUN SERPL-MCNC: 13.1 MG/DL (ref 6–20)
CALCIUM SERPL-MCNC: 9.2 MG/DL (ref 8.6–10)
CHLORIDE SERPL-SCNC: 105 MMOL/L (ref 98–107)
CREAT SERPL-MCNC: 1.16 MG/DL (ref 0.67–1.17)
DEPRECATED HCO3 PLAS-SCNC: 23 MMOL/L (ref 22–29)
EGFRCR SERPLBLD CKD-EPI 2021: 74 ML/MIN/1.73M2
EOSINOPHIL # BLD AUTO: 0.4 10E3/UL (ref 0–0.7)
EOSINOPHIL NFR BLD AUTO: 7 %
ERYTHROCYTE [DISTWIDTH] IN BLOOD BY AUTOMATED COUNT: 14.9 % (ref 10–15)
GLUCOSE SERPL-MCNC: 115 MG/DL (ref 70–99)
HCT VFR BLD AUTO: 36.6 % (ref 40–53)
HGB BLD-MCNC: 12 G/DL (ref 13.3–17.7)
IMM GRANULOCYTES # BLD: 0 10E3/UL
IMM GRANULOCYTES NFR BLD: 0 %
LYMPHOCYTES # BLD AUTO: 1.7 10E3/UL (ref 0.8–5.3)
LYMPHOCYTES NFR BLD AUTO: 27 %
MCH RBC QN AUTO: 29.1 PG (ref 26.5–33)
MCHC RBC AUTO-ENTMCNC: 32.8 G/DL (ref 31.5–36.5)
MCV RBC AUTO: 89 FL (ref 78–100)
MONOCYTES # BLD AUTO: 0.5 10E3/UL (ref 0–1.3)
MONOCYTES NFR BLD AUTO: 8 %
NEUTROPHILS # BLD AUTO: 3.5 10E3/UL (ref 1.6–8.3)
NEUTROPHILS NFR BLD AUTO: 57 %
NRBC # BLD AUTO: 0 10E3/UL
NRBC BLD AUTO-RTO: 0 /100
PLATELET # BLD AUTO: 334 10E3/UL (ref 150–450)
POTASSIUM SERPL-SCNC: 4 MMOL/L (ref 3.4–5.3)
PROT SERPL-MCNC: 6.4 G/DL (ref 6.4–8.3)
RBC # BLD AUTO: 4.12 10E6/UL (ref 4.4–5.9)
SODIUM SERPL-SCNC: 137 MMOL/L (ref 135–145)
TOTAL PROTEIN SERUM FOR ELP: 6.3 G/DL (ref 6.4–8.3)
WBC # BLD AUTO: 6.2 10E3/UL (ref 4–11)

## 2024-02-02 PROCEDURE — 82784 ASSAY IGA/IGD/IGG/IGM EACH: CPT

## 2024-02-02 PROCEDURE — 90480 ADMN SARSCOV2 VAC 1/ONLY CMP: CPT | Performed by: REGISTERED NURSE

## 2024-02-02 PROCEDURE — 250N000011 HC RX IP 250 OP 636: Mod: JZ | Performed by: STUDENT IN AN ORGANIZED HEALTH CARE EDUCATION/TRAINING PROGRAM

## 2024-02-02 PROCEDURE — 86334 IMMUNOFIX E-PHORESIS SERUM: CPT | Performed by: STUDENT IN AN ORGANIZED HEALTH CARE EDUCATION/TRAINING PROGRAM

## 2024-02-02 PROCEDURE — 36415 COLL VENOUS BLD VENIPUNCTURE: CPT

## 2024-02-02 PROCEDURE — 250N000011 HC RX IP 250 OP 636: Performed by: REGISTERED NURSE

## 2024-02-02 PROCEDURE — 84165 PROTEIN E-PHORESIS SERUM: CPT | Mod: TC | Performed by: STUDENT IN AN ORGANIZED HEALTH CARE EDUCATION/TRAINING PROGRAM

## 2024-02-02 PROCEDURE — 96401 CHEMO ANTI-NEOPL SQ/IM: CPT

## 2024-02-02 PROCEDURE — 83521 IG LIGHT CHAINS FREE EACH: CPT | Mod: 59

## 2024-02-02 PROCEDURE — 84460 ALANINE AMINO (ALT) (SGPT): CPT

## 2024-02-02 PROCEDURE — 91320 SARSCV2 VAC 30MCG TRS-SUC IM: CPT | Performed by: REGISTERED NURSE

## 2024-02-02 PROCEDURE — 84155 ASSAY OF PROTEIN SERUM: CPT

## 2024-02-02 PROCEDURE — 99214 OFFICE O/P EST MOD 30 MIN: CPT | Performed by: REGISTERED NURSE

## 2024-02-02 PROCEDURE — 250N000011 HC RX IP 250 OP 636: Performed by: STUDENT IN AN ORGANIZED HEALTH CARE EDUCATION/TRAINING PROGRAM

## 2024-02-02 PROCEDURE — 86334 IMMUNOFIX E-PHORESIS SERUM: CPT | Mod: 26 | Performed by: STUDENT IN AN ORGANIZED HEALTH CARE EDUCATION/TRAINING PROGRAM

## 2024-02-02 PROCEDURE — 90732 PPSV23 VACC 2 YRS+ SUBQ/IM: CPT | Performed by: STUDENT IN AN ORGANIZED HEALTH CARE EDUCATION/TRAINING PROGRAM

## 2024-02-02 PROCEDURE — G0009 ADMIN PNEUMOCOCCAL VACCINE: HCPCS | Performed by: STUDENT IN AN ORGANIZED HEALTH CARE EDUCATION/TRAINING PROGRAM

## 2024-02-02 PROCEDURE — 84165 PROTEIN E-PHORESIS SERUM: CPT | Mod: 26 | Performed by: STUDENT IN AN ORGANIZED HEALTH CARE EDUCATION/TRAINING PROGRAM

## 2024-02-02 PROCEDURE — 85025 COMPLETE CBC W/AUTO DIFF WBC: CPT

## 2024-02-02 PROCEDURE — 99213 OFFICE O/P EST LOW 20 MIN: CPT | Mod: 25 | Performed by: REGISTERED NURSE

## 2024-02-02 RX ORDER — ALBUTEROL SULFATE 90 UG/1
2 AEROSOL, METERED RESPIRATORY (INHALATION) 4 TIMES DAILY
Qty: 18 G | Refills: 0 | Status: SHIPPED | OUTPATIENT
Start: 2024-02-02 | End: 2024-07-19

## 2024-02-02 RX ADMIN — COVID-19 VACCINE, MRNA 30 MCG: 0.05 INJECTION, SUSPENSION INTRAMUSCULAR at 14:05

## 2024-02-02 RX ADMIN — PNEUMOCOCCAL VACCINE POLYVALENT 0.5 ML
25; 25; 25; 25; 25; 25; 25; 25; 25; 25; 25; 25; 25; 25; 25; 25; 25; 25; 25; 25; 25; 25; 25 INJECTION, SOLUTION INTRAMUSCULAR; SUBCUTANEOUS at 14:07

## 2024-02-02 RX ADMIN — DARATUMUMAB AND HYALURONIDASE-FIHJ (HUMAN RECOMBINANT) 1800 MG: 1800; 30000 INJECTION SUBCUTANEOUS at 14:52

## 2024-02-02 ASSESSMENT — PAIN SCALES - GENERAL: PAINLEVEL: NO PAIN (0)

## 2024-02-02 NOTE — LETTER
2/2/2024         RE: June Ronquillo  [address]  [facility] [address]        Dear Colleague,    Thank you for referring your patient, June Ronquillo, to the [facility]. Please see a copy of my visit note below.    [facility]  Date of visit: 02/02/2024        Oncologic History  - 4/30/2021 M spike of 34.8 in urine.M spike in blood 0.2; IgG 702 with depressed IgA and IgM. Beta 2 microglobulin 2.7. Lambda  with K/L ratio of 0.01. WBC 11.1 with absolute eos of 1.0. hgb, plt, Cr (1.1), and albumin WNL.    -4/30/2021 CT abd/pelvis showed sclerotic marrow changes with numerous lytic appearing lesions throughout the imaged portions of the spine, pelvis and ribs.      -PET scan 5/11/2021 Numerous osteolytic lesions which predominantly demonstrate uptake below liver background levels. There is one intraosseous lesion in the left lateral 9th rib that demonstrates uptake above background, possibly due to nondisplaced fracture. Adjacent to this lesion is mild hypermetabolism to the left pleura, with new small left pleural effusion, suspicious for malignant effusion versus posttraumatic effusion. Pleural uptake may represent extramedullary myeloma extending along the intercostal space versus inflammation.    - BM bx 5/17/2021 with flow showing 4.0% plasma cells which express CD19 (dim), CD38 (bright), CD45 (dim), , and monotypic cytoplasmic lambda immunoglobulin light chains but lack CD20 and CD56.  Hypercellular bone marrow for age (approximately 75% cellularity) with adequate trilineage hematopoiesis. Plasma cell infiltrate: Interstitial, lambda monotypic and representing approximately 60% the bone marrow cellularity, by  immunohistochemical stain. Cytpgenetics with 2 related hypodiploid clones. One with loss of Y, monosomy 13 and 14 (5%) and one with translocation of 8p and 14, derivative 16 with long arm replaced by extra  copy 1q,monosomy 21. FISH showed gain of 1q, loss of 13 and 14, IGH-MYC fusion t(8:14)    - Started Miracle-RVd 21 day with velcade on days 1,8,15.     -Cycle 2 Miracle-RVd. Dose reduce dex to 80 mg d/t fatigue and stomach upset on dex days. Also having cough with basilar streaking on CXR    - 8/31/2021 BM bx -rare suspicious plasma cells in touch imprint. No increase in plasma cells by morph.    -8/31/2021 PET/CT Diffuse osteolytic lesions throughout the axial and appendicular skeleton. Increased sclerosis since prior exam 5/11/2021 without increased metabolism or size.  Hypermetabolic pretracheal lymph node as well as hypermetabolic level 2 lymph nodes within the right neck. These lymph nodes are likely reactive from autoimmune activation via medical therapy. Hypermetabolic subcentimeter nodules within the thyroid gland    - 11/11/2021 Autologous BMT    - 2/21/22 started maintenance with Revlimid 10 mg and daratumumab monthly; did Revlimid alone for C1 due to low IgG levels, added daratumumab starting C2.  Initially did Revlimid daily, decreased to 21/28 days in Nov 2023 to help minimize side effects    Interval Hx:   June presents for toxicity evaluation prior to receiving miracle+Revlimid maintenance  today.  Doing well.  Denies any new symptoms or concerns.  He is eating and drinking without issue and good appetite.      Denies fevers/chills, SOB/cough, chest pain, bleeding issues,  N/V,  constipation, rashes/sores, night sweats, new pains, new lumps/bumps. edema, mouth sores.    Physical Exam:    BP (!) 153/88   Pulse 83   Temp 98.1  F (36.7  C) (Oral)   Resp 16   Wt 68.4 kg (150 lb 14.4 oz)   SpO2 100%   BMI 24.36 kg/m      Wt Readings from Last 5 Encounters:   02/02/24 68.4 kg (150 lb 14.4 oz)   02/01/24 65.8 kg (145 lb)   01/05/24 68.3 kg (150 lb 8 oz)   12/15/23 66.5 kg (146 lb 9.6 oz)   12/14/23 68 kg (150 lb)       Constitutional: Appears stated age, well-groomed.  Accompanied by self. In no apparent  "distress.   HEENT: Normocephilic. EOMI, PERRL. Sclerae are anicteric.   Respiratory: No increased work of breathing, good air exchange, clear to auscultation bilaterally, no crackles or wheezing.  Cardiovascular: Normal apical impulse, regular rate and rhythm, normal S1 and S2, and no murmur noted.  GI: +BS. Soft. No tenderness on palpation.  Skin: No bruising or bleeding, normal skin color, texture, turgor, no redness, warmth, or swelling, no rashes, no lesions, no jaundice.  Extremities: No bilateral lower extremity edema.  Neurologic: Awake, alert, oriented to name, place and time.    Vascular access: None      Allergies   Allergen Reactions    Blood Transfusion Related (Informational Only) Other (See Comments)     Patient has a history of a clinically significant antibody against RBC antigens.  A delay in compatible RBCs may occur.      Current Outpatient Medications   Medication    albuterol (PROAIR HFA/PROVENTIL HFA/VENTOLIN HFA) 108 (90 Base) MCG/ACT inhaler    acyclovir (ZOVIRAX) 400 MG tablet    aspirin (ASA) 81 MG EC tablet    calcium carbonate-vitamin D (OSCAL) 500-5 MG-MCG tablet    LENalidomide (REVLIMID) 10 MG CAPS capsule    levothyroxine (SYNTHROID/LEVOTHROID) 125 MCG tablet    Needle, Disp, (B-D BLUNT FILL NEEDLE) 18G X 1-1/2\" MISC    Needle, Disp, (BD DISP NEEDLE) 23G X 1\" MISC    potassium chloride ER (KLOR-CON M) 10 MEQ CR tablet    rosuvastatin (CRESTOR) 40 MG tablet    syringe, disposable, 1 ML MISC    testosterone cypionate (DEPOTESTOSTERONE) 200 MG/ML injection    vitamin B complex with vitamin C (STRESS TAB) tablet     No current facility-administered medications for this visit.     Facility-Administered Medications Ordered in Other Visits   Medication    daratumumab-hyaluronidase-fihj (DARZALEX FASPRO) SUBCUTANEOUS injection 1,800 mg     Most Recent 3 CBC's:  Recent Labs   Lab Test 02/02/24  1317 01/05/24  1418 12/15/23  1329   WBC 6.2 4.8 6.4   HGB 12.0* 11.7* 12.7*   MCV 89 91 92   PLT " 334 363 324   ANEUTAUTO 3.5 2.5 3.8     Most Recent 3 BMP's:  Recent Labs   Lab Test 02/02/24  1317 12/15/23  1329 11/16/23  0926    139 136   POTASSIUM 4.0 3.8 4.1   CHLORIDE 105 105 106   CO2 23 24 22   BUN 13.1 9.4 8.5   CR 1.16 1.14 1.13   ANIONGAP 9 10 8   HARDEEP 9.2 8.7 8.4*   * 98 96   PROTTOTAL 6.4 6.5 6.4   ALBUMIN 4.4 4.4 4.3    Most Recent 3 LFT's:  Recent Labs   Lab Test 02/02/24  1317 12/15/23  1329 11/16/23  0926   AST 24 27 34   ALT 15 13 20   ALKPHOS 59 60 57   BILITOTAL 0.4 0.5 0.4    Most Recent 2 TSH and T4:  Recent Labs   Lab Test 01/08/24  0713 06/26/23  1442 12/19/22  1103 06/17/22  1332 11/14/21  1103 10/20/21  1145   TSH 0.86 0.58   < > 0.07*   < > 0.70   T4  --   --   --  1.36  --  0.97    < > = values in this interval not displayed.     I reviewed the above labs today.    Assessment and Plan  Multiple myeloma with high risk cytogenetics  - Miracle-RVD with VGPR, then underwent autologous transplant 11/11/21.   - Given his high risk cytogenetics he started miracle + revlimid maintenance therapy 2/21/22.  - Completed 2 years of zometa in Sept 2023. Continue Vit D.   - Revlimid was decreased to 21/28 days in Nov 2023 to try to minimize side effects (dysgeusia, flu-like symptoms on following daratumumab)  - Continue miracle + revlimid  - Continue acyclovir daily for viral ppx and bactrim M/T for PJP ppx.   - Received influenza vaccine 12/15/23  - COVID + 10/19/23.  Due for COVID booster, will receive today 02/02/24   - Pneumococcal vaccine due, will receive today 02/02/24     Hypogammaglobulinemia   Start monthly IV Ig if IgG <300  - 11/16/23 IgG 438  - 1/5/24 IgG 483    Normocytic Anemia:   - Likely d/t revlimid and daratumumab.   - Will keep monitoring and dose reduce if needed.      Elevated BP:    -Previously discussed his elevated BP in clinic and recommended following with his PCP.    - He has been taking his BP at home and they are within a normal range.        20 minutes spent on the  date of the encounter doing chart review, review of test results, interpretation of tests, patient visit, and documentation   PATTY Meza [facility] [place] [id number] [place] [address] [phone]

## 2024-02-02 NOTE — NURSING NOTE
"Oncology Rooming Note    February 2, 2024 1:28 PM   June Ronquillo is a 53 year old male who presents for:    Chief Complaint   Patient presents with    Blood Draw     VPT blood draw by lab RN. Vitals taken and appointment arrived    Oncology Clinic Visit     Multiple myeloma      Initial Vitals: BP (!) 153/88   Pulse 83   Temp 98.1  F (36.7  C) (Oral)   Resp 16   Wt 68.4 kg (150 lb 14.4 oz)   SpO2 100%   BMI 24.36 kg/m   Estimated body mass index is 24.36 kg/m  as calculated from the following:    Height as of 2/1/24: 1.676 m (5' 6\").    Weight as of this encounter: 68.4 kg (150 lb 14.4 oz). Body surface area is 1.78 meters squared.  No Pain (0) Comment: Data Unavailable   No LMP for male patient.  Allergies reviewed: Yes  Medications reviewed: Yes    Medications: Medication refills not needed today.  Pharmacy name entered into [employer]:    [facility] [place], [place] - [phone]  [facility] - [place], [place] - [address] [facility] [place], [place] [phone]  [facility] [place], [place] - [address] [facility]  [facility] [place], [place] [facility] [address]    Frailty Screening:   Is the patient here for a new oncology consult visit in cancer care? 2. No      Clinical concerns:        Ivana Espinoza              "

## 2024-02-02 NOTE — NURSING NOTE
Patient presents to the [facility] [place] for Covid and flu vaccine injection.      Order written by Laura Ballesteros was completed today.     Name and  verified with patient. See MAR for medication details.    Pt asked to wait 15 minutes post injection.  Pt was checked in for infusion appt    Immunizations Administered       Name Date Dose VIS Date Route    COVID-19 12+ () (Pfizer) 24  2:05 PM 30 mcg EUI,10/19/2023,Given today Intramuscular    Pneumococcal 23 valent 24  2:07 PM 0.5 mL 10/30/2019, Given Today Subcutaneous            Niharika Marlow CMA ([facility])

## 2024-02-02 NOTE — PROGRESS NOTES
[facility]  Date of visit: 02/02/2024        Oncologic History  - 4/30/2021 M spike of 34.8 in urine.M spike in blood 0.2; IgG 702 with depressed IgA and IgM. Beta 2 microglobulin 2.7. Lambda  with K/L ratio of 0.01. WBC 11.1 with absolute eos of 1.0. hgb, plt, Cr (1.1), and albumin WNL.    -4/30/2021 CT abd/pelvis showed sclerotic marrow changes with numerous lytic appearing lesions throughout the imaged portions of the spine, pelvis and ribs.      -PET scan 5/11/2021 Numerous osteolytic lesions which predominantly demonstrate uptake below liver background levels. There is one intraosseous lesion in the left lateral 9th rib that demonstrates uptake above background, possibly due to nondisplaced fracture. Adjacent to this lesion is mild hypermetabolism to the left pleura, with new small left pleural effusion, suspicious for malignant effusion versus posttraumatic effusion. Pleural uptake may represent extramedullary myeloma extending along the intercostal space versus inflammation.    - BM bx 5/17/2021 with flow showing 4.0% plasma cells which express CD19 (dim), CD38 (bright), CD45 (dim), , and monotypic cytoplasmic lambda immunoglobulin light chains but lack CD20 and CD56.  Hypercellular bone marrow for age (approximately 75% cellularity) with adequate trilineage hematopoiesis. Plasma cell infiltrate: Interstitial, lambda monotypic and representing approximately 60% the bone marrow cellularity, by  immunohistochemical stain. Cytpgenetics with 2 related hypodiploid clones. One with loss of Y, monosomy 13 and 14 (5%) and one with translocation of 8p and 14, derivative 16 with long arm replaced by extra copy 1q,monosomy 21. FISH showed gain of 1q, loss of 13 and 14, IGH-MYC fusion t(8:14)    - Started Miracle-RVd 21 day with velcade on days 1,8,15.     -Cycle 2 Miracle-RVd. Dose reduce dex to 80 mg d/t fatigue and stomach upset on dex days. Also having cough with basilar  streaking on CXR    - 8/31/2021 BM bx -rare suspicious plasma cells in touch imprint. No increase in plasma cells by morph.    -8/31/2021 PET/CT Diffuse osteolytic lesions throughout the axial and appendicular skeleton. Increased sclerosis since prior exam 5/11/2021 without increased metabolism or size.  Hypermetabolic pretracheal lymph node as well as hypermetabolic level 2 lymph nodes within the right neck. These lymph nodes are likely reactive from autoimmune activation via medical therapy. Hypermetabolic subcentimeter nodules within the thyroid gland    - 11/11/2021 Autologous BMT    - 2/21/22 started maintenance with Revlimid 10 mg and daratumumab monthly; did Revlimid alone for C1 due to low IgG levels, added daratumumab starting C2.  Initially did Revlimid daily, decreased to 21/28 days in Nov 2023 to help minimize side effects    Interval Hx:   June presents for toxicity evaluation prior to receiving vidya+Revlimid maintenance  today.  Doing well.  Denies any new symptoms or concerns.  He is eating and drinking without issue and good appetite.      Denies fevers/chills, SOB/cough, chest pain, bleeding issues,  N/V,  constipation, rashes/sores, night sweats, new pains, new lumps/bumps. edema, mouth sores.    Physical Exam:    BP (!) 153/88   Pulse 83   Temp 98.1  F (36.7  C) (Oral)   Resp 16   Wt 68.4 kg (150 lb 14.4 oz)   SpO2 100%   BMI 24.36 kg/m      Wt Readings from Last 5 Encounters:   02/02/24 68.4 kg (150 lb 14.4 oz)   02/01/24 65.8 kg (145 lb)   01/05/24 68.3 kg (150 lb 8 oz)   12/15/23 66.5 kg (146 lb 9.6 oz)   12/14/23 68 kg (150 lb)       Constitutional: Appears stated age, well-groomed.  Accompanied by self. In no apparent distress.   HEENT: Normocephilic. EOMI, PERRL. Sclerae are anicteric.   Respiratory: No increased work of breathing, good air exchange, clear to auscultation bilaterally, no crackles or wheezing.  Cardiovascular: Normal apical impulse, regular rate and rhythm,  "normal S1 and S2, and no murmur noted.  GI: +BS. Soft. No tenderness on palpation.  Skin: No bruising or bleeding, normal skin color, texture, turgor, no redness, warmth, or swelling, no rashes, no lesions, no jaundice.  Extremities: No bilateral lower extremity edema.  Neurologic: Awake, alert, oriented to name, place and time.    Vascular access: None      Allergies   Allergen Reactions    Blood Transfusion Related (Informational Only) Other (See Comments)     Patient has a history of a clinically significant antibody against RBC antigens.  A delay in compatible RBCs may occur.      Current Outpatient Medications   Medication    albuterol (PROAIR HFA/PROVENTIL HFA/VENTOLIN HFA) 108 (90 Base) MCG/ACT inhaler    acyclovir (ZOVIRAX) 400 MG tablet    aspirin (ASA) 81 MG EC tablet    calcium carbonate-vitamin D (OSCAL) 500-5 MG-MCG tablet    LENalidomide (REVLIMID) 10 MG CAPS capsule    levothyroxine (SYNTHROID/LEVOTHROID) 125 MCG tablet    Needle, Disp, (B-D BLUNT FILL NEEDLE) 18G X 1-1/2\" MISC    Needle, Disp, (BD DISP NEEDLE) 23G X 1\" MISC    potassium chloride ER (KLOR-CON M) 10 MEQ CR tablet    rosuvastatin (CRESTOR) 40 MG tablet    syringe, disposable, 1 ML MISC    testosterone cypionate (DEPOTESTOSTERONE) 200 MG/ML injection    vitamin B complex with vitamin C (STRESS TAB) tablet     No current facility-administered medications for this visit.     Facility-Administered Medications Ordered in Other Visits   Medication    daratumumab-hyaluronidase-fihj (DARZALEX FASPRO) SUBCUTANEOUS injection 1,800 mg     Most Recent 3 CBC's:  Recent Labs   Lab Test 02/02/24  1317 01/05/24  1418 12/15/23  1329   WBC 6.2 4.8 6.4   HGB 12.0* 11.7* 12.7*   MCV 89 91 92    363 324   ANEUTAUTO 3.5 2.5 3.8     Most Recent 3 BMP's:  Recent Labs   Lab Test 02/02/24  1317 12/15/23  1329 11/16/23  0926    139 136   POTASSIUM 4.0 3.8 4.1   CHLORIDE 105 105 106   CO2 23 24 22   BUN 13.1 9.4 8.5   CR 1.16 1.14 1.13   ANIONGAP 9 10 " 8   HARDEEP 9.2 8.7 8.4*   * 98 96   PROTTOTAL 6.4 6.5 6.4   ALBUMIN 4.4 4.4 4.3    Most Recent 3 LFT's:  Recent Labs   Lab Test 02/02/24  1317 12/15/23  1329 11/16/23  0926   AST 24 27 34   ALT 15 13 20   ALKPHOS 59 60 57   BILITOTAL 0.4 0.5 0.4    Most Recent 2 TSH and T4:  Recent Labs   Lab Test 01/08/24  0713 06/26/23  1442 12/19/22  1103 06/17/22  1332 11/14/21  1103 10/20/21  1145   TSH 0.86 0.58   < > 0.07*   < > 0.70   T4  --   --   --  1.36  --  0.97    < > = values in this interval not displayed.     I reviewed the above labs today.    Assessment and Plan  Multiple myeloma with high risk cytogenetics  - Miracle-RVD with VGPR, then underwent autologous transplant 11/11/21.   - Given his high risk cytogenetics he started miracle + revlimid maintenance therapy 2/21/22.  - Completed 2 years of zometa in Sept 2023. Continue Vit D.   - Revlimid was decreased to 21/28 days in Nov 2023 to try to minimize side effects (dysgeusia, flu-like symptoms on following daratumumab)  - Continue miracle + revlimid  - Continue acyclovir daily for viral ppx and bactrim M/T for PJP ppx.   - Received influenza vaccine 12/15/23  - COVID + 10/19/23.  Due for COVID booster, will receive today 02/02/24   - Pneumococcal vaccine due, will receive today 02/02/24     Hypogammaglobulinemia   Start monthly IV Ig if IgG <300  - 11/16/23 IgG 438  - 1/5/24 IgG 483    Normocytic Anemia:   - Likely d/t revlimid and daratumumab.   - Will keep monitoring and dose reduce if needed.      Elevated BP:    -Previously discussed his elevated BP in clinic and recommended following with his PCP.    - He has been taking his BP at home and they are within a normal range.        20 minutes spent on the date of the encounter doing chart review, review of test results, interpretation of tests, patient visit, and documentation   PATTY Meza CNP  [facility]  [address] [place] [address] [phone]

## 2024-02-02 NOTE — PROGRESS NOTES
Infusion Nursing Note:  June Ronquillo presents today for C27D1 Darzalex Faspro.    Patient seen by provider today: Yes: Laura Ballesteros NP   present during visit today: Not Applicable.    Note: No complaints.      Intravenous Access:  No Intravenous access/labs at this visit.    Treatment Conditions:  Lab Results   Component Value Date    HGB 12.0 (L) 02/02/2024    WBC 6.2 02/02/2024    ANEU 4.7 03/13/2023    ANEUTAUTO 3.5 02/02/2024     02/02/2024        Lab Results   Component Value Date     02/02/2024    POTASSIUM 4.0 02/02/2024    MAG 2.6 (H) 04/28/2023    CR 1.16 02/02/2024    HARDEEP 9.2 02/02/2024    BILITOTAL 0.4 02/02/2024    ALBUMIN 4.4 02/02/2024    ALT 15 02/02/2024    AST 24 02/02/2024       Results reviewed, labs MET treatment parameters, ok to proceed with treatment.      Post Infusion Assessment:  Patient tolerated One Darzalex Faspro injection on left lower abdomen without incident.  Site patent and intact, free from redness, edema or discomfort.  No evidence of extravasations.       Discharge Plan:   Patient declined prescription refills.  Discharge instructions reviewed with: Patient.  Patient and/or family verbalized understanding of discharge instructions and all questions answered.  AVS to patient via [employer].  Patient will return 3/1/24 for next appointment.   Patient discharged in stable condition accompanied by: self.  Departure Mode: Ambulatory.      WILIAM PELAYO RN

## 2024-02-05 LAB
ALBUMIN SERPL ELPH-MCNC: 4.3 G/DL (ref 3.7–5.1)
ALPHA1 GLOB SERPL ELPH-MCNC: 0.3 G/DL (ref 0.2–0.4)
ALPHA2 GLOB SERPL ELPH-MCNC: 0.5 G/DL (ref 0.5–0.9)
B-GLOBULIN SERPL ELPH-MCNC: 0.7 G/DL (ref 0.6–1)
GAMMA GLOB SERPL ELPH-MCNC: 0.4 G/DL (ref 0.7–1.6)
IGA SERPL-MCNC: 57 MG/DL (ref 84–499)
IGG SERPL-MCNC: 465 MG/DL (ref 610–1616)
IGM SERPL-MCNC: 21 MG/DL (ref 35–242)
KAPPA LC FREE SER-MCNC: 0.82 MG/DL (ref 0.33–1.94)
KAPPA LC FREE/LAMBDA FREE SER NEPH: 1.58 {RATIO} (ref 0.26–1.65)
LAMBDA LC FREE SERPL-MCNC: 0.52 MG/DL (ref 0.57–2.63)
LOCATION OF TASK: ABNORMAL
LOCATION OF TASK: NORMAL
M PROTEIN SERPL ELPH-MCNC: 0.1 G/DL
PROT PATTERN SERPL ELPH-IMP: ABNORMAL
PROT PATTERN SERPL IFE-IMP: NORMAL

## 2024-02-07 ENCOUNTER — PATIENT OUTREACH (OUTPATIENT)
Dept: GASTROENTEROLOGY | Facility: CLINIC | Age: 54
End: 2024-02-07
Payer: COMMERCIAL

## 2024-02-07 ENCOUNTER — PREP FOR PROCEDURE (OUTPATIENT)
Dept: GASTROENTEROLOGY | Facility: CLINIC | Age: 54
End: 2024-02-07
Payer: COMMERCIAL

## 2024-02-07 DIAGNOSIS — D3A.8: Primary | ICD-10-CM

## 2024-02-07 DIAGNOSIS — K62.1 RECTAL POLYP: Primary | ICD-10-CM

## 2024-02-07 LAB
PATH REPORT.COMMENTS IMP SPEC: NORMAL
PATH REPORT.FINAL DX SPEC: NORMAL
PATH REPORT.GROSS SPEC: NORMAL
PATH REPORT.MICROSCOPIC SPEC OTHER STN: NORMAL
PATH REPORT.RELEVANT HX SPEC: NORMAL
PHOTO IMAGE: NORMAL

## 2024-02-07 RX ORDER — BISACODYL 5 MG/1
5 TABLET, DELAYED RELEASE ORAL SEE ADMIN INSTRUCTIONS
Qty: 2 TABLET | Refills: 0 | Status: SHIPPED | OUTPATIENT
Start: 2024-02-07 | End: 2024-02-28

## 2024-02-07 RX ORDER — POLYETHYLENE GLYCOL 3350 17 G/17G
238 POWDER, FOR SOLUTION ORAL SEE ADMIN INSTRUCTIONS
Qty: 1 EACH | Refills: 0 | Status: SHIPPED | OUTPATIENT
Start: 2024-02-07 | End: 2024-02-28

## 2024-02-07 NOTE — TELEPHONE ENCOUNTER
Called to discuss request from Dr Skinner to have Dr Henry perform lower EUS for rectal polyp removal, asking that this be scheduled for tomorrow 2/8.      Explained they  will need a , someone to stay with them for 24 hours and should stay in town for 24 hours (within 45 min of Hospital) post procedure    Patient needs to get pre-op physical completed. If outside [facility] will need physical faxed to number [phone]     If you do not get a preop physical, your procedure could be cancelled, patient voiced understanding*    Preop Plan: H & P from 2/1/24 procedure    Does patient have [employer] insurance?:[employer]    Med Review    Blood thinner -  none  ASA - 81 mg  Diabetic - none  Any meds by injection or mouth for weight loss or diabetes- none    Patient Education r/t procedure: darin    A pre-op nurse will call 1-2 days prior to the procedure.    NPO/Prep:   Miralax bowel prep, pt is vegetarian and eats a lot of nuts and grains. He has not been prepping for this procedure and has had these foods within the last week. Per Dr Skinner's request, pt will do miralax bowel prep tonight in case his procedure can be added on for tomorrow.     Verbalized understanding of all instructions. All questions answered.     Procedure order placed, message routed to OR

## 2024-02-08 ENCOUNTER — HOSPITAL ENCOUNTER (OUTPATIENT)
Facility: CLINIC | Age: 54
Discharge: HOME | End: 2024-02-08
Attending: INTERNAL MEDICINE | Admitting: INTERNAL MEDICINE
Payer: COMMERCIAL

## 2024-02-08 ENCOUNTER — ANESTHESIA EVENT (OUTPATIENT)
Dept: SURGERY | Facility: CLINIC | Age: 54
End: 2024-02-08
Payer: COMMERCIAL

## 2024-02-08 ENCOUNTER — ANESTHESIA (OUTPATIENT)
Dept: SURGERY | Facility: CLINIC | Age: 54
End: 2024-02-08
Payer: COMMERCIAL

## 2024-02-08 VITALS
WEIGHT: 151.46 LBS | HEIGHT: 66 IN | BODY MASS INDEX: 24.34 KG/M2 | SYSTOLIC BLOOD PRESSURE: 129 MMHG | RESPIRATION RATE: 16 BRPM | HEART RATE: 80 BPM | DIASTOLIC BLOOD PRESSURE: 81 MMHG | TEMPERATURE: 97 F | OXYGEN SATURATION: 100 %

## 2024-02-08 PROCEDURE — 999N000141 HC STATISTIC PRE-PROCEDURE NURSING ASSESSMENT: Performed by: INTERNAL MEDICINE

## 2024-02-08 PROCEDURE — 88341 IMHCHEM/IMCYTCHM EA ADD ANTB: CPT | Mod: 26 | Performed by: PATHOLOGY

## 2024-02-08 PROCEDURE — 370N000017 HC ANESTHESIA TECHNICAL FEE, PER MIN: Performed by: INTERNAL MEDICINE

## 2024-02-08 PROCEDURE — 88307 TISSUE EXAM BY PATHOLOGIST: CPT | Mod: 26 | Performed by: PATHOLOGY

## 2024-02-08 PROCEDURE — 258N000003 HC RX IP 258 OP 636: Performed by: NURSE ANESTHETIST, CERTIFIED REGISTERED

## 2024-02-08 PROCEDURE — 88360 TUMOR IMMUNOHISTOCHEM/MANUAL: CPT | Mod: 26 | Performed by: PATHOLOGY

## 2024-02-08 PROCEDURE — 710N000012 HC RECOVERY PHASE 2, PER MINUTE: Performed by: INTERNAL MEDICINE

## 2024-02-08 PROCEDURE — 360N000078 HC SURGERY LEVEL 5, PER MIN: Performed by: INTERNAL MEDICINE

## 2024-02-08 PROCEDURE — 250N000009 HC RX 250: Performed by: INTERNAL MEDICINE

## 2024-02-08 PROCEDURE — 250N000009 HC RX 250: Performed by: NURSE ANESTHETIST, CERTIFIED REGISTERED

## 2024-02-08 PROCEDURE — 88342 IMHCHEM/IMCYTCHM 1ST ANTB: CPT | Mod: TC,XU | Performed by: INTERNAL MEDICINE

## 2024-02-08 PROCEDURE — 250N000011 HC RX IP 250 OP 636: Performed by: NURSE ANESTHETIST, CERTIFIED REGISTERED

## 2024-02-08 PROCEDURE — 88342 IMHCHEM/IMCYTCHM 1ST ANTB: CPT | Mod: 26 | Performed by: PATHOLOGY

## 2024-02-08 PROCEDURE — 272N000002 HC OR SUPPLY OTHER OPNP: Performed by: INTERNAL MEDICINE

## 2024-02-08 PROCEDURE — 272N000001 HC OR GENERAL SUPPLY STERILE: Performed by: INTERNAL MEDICINE

## 2024-02-08 RX ORDER — NALOXONE HYDROCHLORIDE 0.4 MG/ML
0.4 INJECTION, SOLUTION INTRAMUSCULAR; INTRAVENOUS; SUBCUTANEOUS
Status: CANCELLED | OUTPATIENT
Start: 2024-02-08

## 2024-02-08 RX ORDER — ONDANSETRON 2 MG/ML
INJECTION INTRAMUSCULAR; INTRAVENOUS PRN
Status: DISCONTINUED | OUTPATIENT
Start: 2024-02-08 | End: 2024-02-08

## 2024-02-08 RX ORDER — FLUMAZENIL 0.1 MG/ML
0.2 INJECTION, SOLUTION INTRAVENOUS
Status: CANCELLED | OUTPATIENT
Start: 2024-02-08 | End: 2024-02-09

## 2024-02-08 RX ORDER — LIDOCAINE HYDROCHLORIDE 20 MG/ML
INJECTION, SOLUTION INFILTRATION; PERINEURAL PRN
Status: DISCONTINUED | OUTPATIENT
Start: 2024-02-08 | End: 2024-02-08

## 2024-02-08 RX ORDER — FENTANYL CITRATE 50 UG/ML
INJECTION, SOLUTION INTRAMUSCULAR; INTRAVENOUS PRN
Status: DISCONTINUED | OUTPATIENT
Start: 2024-02-08 | End: 2024-02-08

## 2024-02-08 RX ORDER — PROPOFOL 10 MG/ML
INJECTION, EMULSION INTRAVENOUS CONTINUOUS PRN
Status: DISCONTINUED | OUTPATIENT
Start: 2024-02-08 | End: 2024-02-08

## 2024-02-08 RX ORDER — NALOXONE HYDROCHLORIDE 0.4 MG/ML
0.2 INJECTION, SOLUTION INTRAMUSCULAR; INTRAVENOUS; SUBCUTANEOUS
Status: CANCELLED | OUTPATIENT
Start: 2024-02-08

## 2024-02-08 RX ORDER — ONDANSETRON 2 MG/ML
4 INJECTION INTRAMUSCULAR; INTRAVENOUS EVERY 6 HOURS PRN
Status: CANCELLED | OUTPATIENT
Start: 2024-02-08

## 2024-02-08 RX ORDER — SODIUM CHLORIDE, SODIUM LACTATE, POTASSIUM CHLORIDE, CALCIUM CHLORIDE 600; 310; 30; 20 MG/100ML; MG/100ML; MG/100ML; MG/100ML
INJECTION, SOLUTION INTRAVENOUS CONTINUOUS PRN
Status: DISCONTINUED | OUTPATIENT
Start: 2024-02-08 | End: 2024-02-08

## 2024-02-08 RX ORDER — ONDANSETRON 4 MG/1
4 TABLET, ORALLY DISINTEGRATING ORAL EVERY 6 HOURS PRN
Status: CANCELLED | OUTPATIENT
Start: 2024-02-08

## 2024-02-08 RX ORDER — PROCHLORPERAZINE MALEATE 5 MG
10 TABLET ORAL EVERY 6 HOURS PRN
Status: CANCELLED | OUTPATIENT
Start: 2024-02-08

## 2024-02-08 RX ORDER — LIDOCAINE 40 MG/G
CREAM TOPICAL
Status: DISCONTINUED | OUTPATIENT
Start: 2024-02-08 | End: 2024-02-08 | Stop reason: HOSPADM

## 2024-02-08 RX ADMIN — ONDANSETRON 4 MG: 2 INJECTION INTRAMUSCULAR; INTRAVENOUS at 14:20

## 2024-02-08 RX ADMIN — LIDOCAINE HYDROCHLORIDE 40 MG: 20 INJECTION, SOLUTION INFILTRATION; PERINEURAL at 14:17

## 2024-02-08 RX ADMIN — SODIUM CHLORIDE, POTASSIUM CHLORIDE, SODIUM LACTATE AND CALCIUM CHLORIDE: 600; 310; 30; 20 INJECTION, SOLUTION INTRAVENOUS at 14:15

## 2024-02-08 RX ADMIN — PROPOFOL 125 MCG/KG/MIN: 10 INJECTION, EMULSION INTRAVENOUS at 14:18

## 2024-02-08 RX ADMIN — FENTANYL CITRATE 25 MCG: 50 INJECTION INTRAMUSCULAR; INTRAVENOUS at 14:32

## 2024-02-08 RX ADMIN — FENTANYL CITRATE 25 MCG: 50 INJECTION INTRAMUSCULAR; INTRAVENOUS at 14:24

## 2024-02-08 RX ADMIN — FENTANYL CITRATE 25 MCG: 50 INJECTION INTRAMUSCULAR; INTRAVENOUS at 14:28

## 2024-02-08 NOTE — ANESTHESIA CARE TRANSFER NOTE
Patient: June Ronquillo    Procedure: Procedure(s):  ULTRASOUND, LOWER GI TRACT, ENDOSCOPIC and EMR       Diagnosis: Rectal polyp [K62.1]  Diagnosis Additional Information: No value filed.    Anesthesia Type:   MAC     Note:    Oropharynx: oropharynx clear of all foreign objects and spontaneously breathing  Level of Consciousness: awake  Oxygen Supplementation: room air    Independent Airway: airway patency satisfactory and stable  Dentition: dentition unchanged  Vital Signs Stable: post-procedure vital signs reviewed and stable  Report to RN Given: handoff report given  Patient transferred to: Phase II    Handoff Report: Identifed the Patient, Identified the Reponsible Provider, Reviewed the pertinent medical history, Discussed the surgical course, Reviewed Intra-OP anesthesia mangement and issues during anesthesia, Set expectations for post-procedure period and Allowed opportunity for questions and acknowledgement of understanding      Vitals:  Vitals Value Taken Time   /69 02/08/24 1523   Temp     Pulse 91 02/08/24 1523   Resp     SpO2 100 % 02/08/24 1524   Vitals shown include unfiled device data.    Electronically Signed By: PATTY Polo CRNA  February 8, 2024  3:26 PM

## 2024-02-08 NOTE — OR NURSING
Spoke to Dr. Neville. She stated she will place the discharge order now. However, she is backcharting and it will take time to complete the brief op note. She stated the patient can be discharged as she is working on the brief op note, it should be posted shortly.

## 2024-02-08 NOTE — BRIEF OP NOTE
[facility]    Brief Operative Note    Pre-operative diagnosis: Rectal polyp [K62.1]  Post-operative diagnosis Same as pre-operative diagnosis    Procedure: ULTRASOUND, LOWER GI TRACT, ENDOSCOPIC and EMR, N/A - Anus    Surgeon: Surgeon(s) and Role:     * Alfredo Henry MD - Primary     * Yvette Neville MD - Fellow - Assisting  Anesthesia: MAC   Estimated Blood Loss: None    Drains: None  Specimens:   ID Type Source Tests Collected by Time Destination   1 : rectal lesion Tissue Rectum SURGICAL PATHOLOGY EXAM Alfredo Henry MD 2/8/2024  3:07 PM      Findings:   None.  Complications: None.  Implants: * No implants in log *    Findings: Successful cap-assisted Duette resection of a 2mm rectal lesion arising from the deep musoca without penetration into the muscularis propia invsion on endosonographic exam.      Recommendations:  - Observe patient in same day observation unit for possible discharge same day.   - Await path results.   - Follow up based on results of pathology studies.  - The findings and recommendations were discussed with the patient and their spouse.

## 2024-02-08 NOTE — ANESTHESIA POSTPROCEDURE EVALUATION
Patient: June Ronquillo    Procedure: Procedure(s):  ULTRASOUND, LOWER GI TRACT, ENDOSCOPIC and EMR       Anesthesia Type:  MAC    Note:  Disposition: Outpatient   Postop Pain Control: Uneventful            Sign Out: Well controlled pain   PONV: No   Neuro/Psych: Uneventful            Sign Out: Acceptable/Baseline neuro status   Airway/Respiratory: Uneventful            Sign Out: Acceptable/Baseline resp. status   CV/Hemodynamics: Uneventful            Sign Out: Acceptable CV status; No obvious hypovolemia; No obvious fluid overload   Other NRE: NONE   DID A NON-ROUTINE EVENT OCCUR?            Last vitals:  Vitals Value Taken Time   /81 02/08/24 1614   Temp 36.1  C (97  F) 02/08/24 1614   Pulse 80 02/08/24 1614   Resp 16 02/08/24 1614   SpO2 100 % 02/08/24 1615   Vitals shown include unfiled device data.    Electronically Signed By: Alek Zimmerman Junior, MD  February 8, 2024  4:23 PM

## 2024-02-08 NOTE — TELEPHONE ENCOUNTER
Called pt to update that procedure is going to be able to be performed today, OR control desk asked that patient arrive at 1pm.  Discussed with patient, he had not completed the bowel prep this morning, was told by Dr Skinner to wait for this phone call to complete it. He will do so now, advised doing the 2nd round of dulcolax and miralax now, if possible the magnesium citrate at 11am.  Updated mychart letter sent with address of procedure and final details. Pt questions answered.

## 2024-02-08 NOTE — DISCHARGE INSTRUCTIONS
Contacting your Doctor -   To contact a doctor, call Dr Henry at the  GI clinic at [phone] or [phone] and ask for the resident on call for Gastroenterology (answered 24 hours a day)   Emergency Department:  [facility]: [phone]  [facility]: [phone] if you are in need of immediate or emergent help

## 2024-02-08 NOTE — ANESTHESIA PREPROCEDURE EVALUATION
Anesthesia Pre-Procedure Evaluation    Patient: June Ronquillo   MRN: [id number] : 1970        Procedure : Procedure(s):  ULTRASOUND, LOWER GI TRACT, ENDOSCOPIC and EMR          Past Medical History:   Diagnosis Date    CAD (coronary artery disease)     s/p stent to CFX    Heart attack (H)     Hyperlipidemia LDL goal <100     Hypertension     Stented coronary artery     Thyroid disease       Past Surgical History:   Procedure Laterality Date    BONE MARROW BIOPSY, BONE SPECIMEN, NEEDLE/TROCAR Left 2021    Procedure: BIOPSY, BONE MARROW with aspiration and ancillary studies;  Surgeon: Niharika Regalado MD;  Location: [facility] OR    BONE MARROW BIOPSY, BONE SPECIMEN, NEEDLE/TROCAR Left 10/14/2021    Procedure: BIOPSY, BONE MARROW;  Surgeon: Alexa Albert APRN CNP;  Location: [facility] OR    BONE MARROW BIOPSY, BONE SPECIMEN, NEEDLE/TROCAR Right 3/7/2022    Procedure: BIOPSY, BONE MARROW;  Surgeon: Deborah Carmona PA-C;  Location: [facility] OR    BONE MARROW BIOPSY, BONE SPECIMEN, NEEDLE/TROCAR N/A 2022    Procedure: BIOPSY, BONE MARROW;  Surgeon: Deborah Leblanc PA-C;  Location: [facility] OR    BONE MARROW BIOPSY, BONE SPECIMEN, NEEDLE/TROCAR Left 2022    Procedure: BIOPSY, BONE MARROW;  Surgeon: Alba Moses APRN CNP;  Location: [facility] OR    BONE MARROW BIOPSY, BONE SPECIMEN, NEEDLE/TROCAR N/A 2023    Procedure: Bone marrow biopsy, bone specimen, needle/trocar;  Surgeon: Sneha Vance PA-C;  Location: [facility] OR    COLONOSCOPY N/A 2024    Procedure: COLONOSCOPY, WITH POLYPECTOMY;  Surgeon: Candice Skinner MD;  Location: [facility] OR    HEART CATH PTCA SINGLE VESSEL  10/10/2004    Stent to X - Niblitz     INSERT CATHETER VASCULAR ACCESS Right 11/3/2021    Procedure: NON VALVED TUNNELED DUAL LUMEN APHARESIS CAPABLE LINE INSERTION, VASCULAR ACCESS CATHETER;  Surgeon: Werner Mcnamara MD;  Location: [facility] OR    IR CVC TUNNEL PLACEMENT > 5 YRS OF AGE  11/3/2021     IR CVC TUNNEL REMOVAL RIGHT  12/2/2021    THYROIDECTOMY N/A 5/3/2022    Procedure: total thyroidectomy, Left selective neck dissection level 6 and level 7 ;  Surgeon: Clarita Sepulveda MD;  Location: [facility] OR      Allergies   Allergen Reactions    Blood Transfusion Related (Informational Only) Other (See Comments)     Patient has a history of a clinically significant antibody against RBC antigens.  A delay in compatible RBCs may occur.       Social History     Tobacco Use    Smoking status: Never    Smokeless tobacco: Never   Substance Use Topics    Alcohol use: Not Currently      Wt Readings from Last 1 Encounters:   02/02/24 68.4 kg (150 lb 14.4 oz)        Anesthesia Evaluation   Pt has had prior anesthetic. Type: General.        ROS/MED HX  ENT/Pulmonary:  - neg pulmonary ROS     Neurologic:  - neg neurologic ROS     Cardiovascular: Comment: Sinus rhythm   Nonspecific ST and T wave abnormality   Abnormal ECG   When compared with ECG of 17-MAY-2021 09:43,   Nonspecific T wave abnormality, improved in Inferior leads   T wave inversion no longer evident in Anterolateral leads   Confirmed by Nolan Kern ([id number]) on 10/14/2021 10:21:05 AM       (+) Dyslipidemia hypertension- -  CAD - past MI - stent-                                      METS/Exercise Tolerance: >4 METS    Hematologic: Comments: Lab Test        02/02/24     01/05/24     12/15/23     12/19/22     12/05/22     05/16/22     05/09/22     03/24/22     03/07/22                       1317          1418          1329          1105          1144          1717          0928          1503          0834          WBC          6.2          4.8          6.4            < >        4.6            < >        5.2            < >        7.6  7.7     HGB          12.0*        11.7*        12.7*          < >        11.3*          < >        12.0*          < >        12.2*  12.*  MCV          89           91           92             < >        91             < >         90             < >        93  92       PLT          334          363          324            < >        338            < >        321            < >        350  349     INR           --           --           --           --          0.98          --          0.89          --          0.96           < > = values in this interval not displayed.                  Lab Test        02/02/24     12/15/23     11/16/23                       1317          1329          0926          NA           137          139          136           POTASSIUM    4.0          3.8          4.1           CHLORIDE     105          105          106           CO2          23           24           22            BUN          13.1         9.4          8.5           CR           1.16         1.14         1.13          ANIONGAP     9            10           8             HARDEEP          9.2          8.7          8.4*          GLC          115*         98           96                  Musculoskeletal:  - neg musculoskeletal ROS     GI/Hepatic: Comment: Rectal polyp      Renal/Genitourinary:  - neg Renal ROS     Endo:     (+)          thyroid problem, hypothyroidism,           Psychiatric/Substance Use:  - neg psychiatric ROS     Infectious Disease:  - neg infectious disease ROS     Malignancy: Comment: (Multiple myeloma, normal calcium, prior fractures prompted eval      Other:  - neg other ROS          Physical Exam    Airway        Mallampati: II   TM distance: > 3 FB   Neck ROM: full   Mouth opening: > 3 cm    Respiratory Devices and Support         Dental       (+) Minor Abnormalities - some fillings, tiny chips      Cardiovascular   cardiovascular exam normal          Pulmonary   pulmonary exam normal                OUTSIDE LABS:  CBC:   Lab Results   Component Value Date    WBC 6.2 02/02/2024    WBC 4.8 01/05/2024    HGB 12.0 (L) 02/02/2024    HGB 11.7 (L) 01/05/2024    HCT 36.6 (L) 02/02/2024    HCT 35.9 (L) 01/05/2024     02/02/2024     " 01/05/2024     BMP:   Lab Results   Component Value Date     02/02/2024     12/15/2023    POTASSIUM 4.0 02/02/2024    POTASSIUM 3.8 12/15/2023    CHLORIDE 105 02/02/2024    CHLORIDE 105 12/15/2023    CO2 23 02/02/2024    CO2 24 12/15/2023    BUN 13.1 02/02/2024    BUN 9.4 12/15/2023    CR 1.16 02/02/2024    CR 1.14 12/15/2023     (H) 02/02/2024    GLC 98 12/15/2023     COAGS:   Lab Results   Component Value Date    PTT 41 (H) 11/22/2021    INR 0.98 12/05/2022    FIBR 485 11/21/2021     POC: No results found for: \"BGM\", \"HCG\", \"HCGS\"  HEPATIC:   Lab Results   Component Value Date    ALBUMIN 4.4 02/02/2024    PROTTOTAL 6.4 02/02/2024    ALT 15 02/02/2024    AST 24 02/02/2024    ALKPHOS 59 02/02/2024    BILITOTAL 0.4 02/02/2024     OTHER:   Lab Results   Component Value Date    LACT 0.6 (L) 04/19/2023    A1C 5.5 06/26/2023    HARDEEP 9.2 02/02/2024    PHOS 2.7 06/05/2023    MAG 2.6 (H) 04/28/2023    LIPASE 21 04/19/2023    AMYLASE 58 04/19/2023    TSH 0.86 01/08/2024    T4 1.36 06/17/2022    CRP <2.9 04/30/2021       Anesthesia Plan    ASA Status:  3       Anesthesia Type: MAC.     - Reason for MAC: immobility needed   Induction: Propofol.   Maintenance: Balanced.        Consents    Anesthesia Plan(s) and associated risks, benefits, and realistic alternatives discussed. Questions answered and patient/representative(s) expressed understanding.     - Discussed:     - Discussed with:  Patient      - Extended Intubation/Ventilatory Support Discussed: No.      - Patient is DNR/DNI Status: No     Use of blood products discussed: No .     Postoperative Care    Pain management: IV analgesics.   PONV prophylaxis: Dexamethasone or Solumedrol, Ondansetron (or other 5HT-3)     Comments:               Dominguez Abrams MD    I have reviewed the pertinent notes and labs in the chart from the past 30 days and (re)examined the patient.  Any updates or changes from those notes are reflected in this note.           "   # Drug Induced Platelet Defect: home medication list includes an antiplatelet medication

## 2024-02-13 ENCOUNTER — OFFICE VISIT (OUTPATIENT)
Dept: URGENT CARE | Facility: URGENT CARE | Age: 54
End: 2024-02-13
Payer: COMMERCIAL

## 2024-02-13 VITALS
HEIGHT: 66 IN | HEART RATE: 83 BPM | OXYGEN SATURATION: 99 % | DIASTOLIC BLOOD PRESSURE: 86 MMHG | BODY MASS INDEX: 24.43 KG/M2 | TEMPERATURE: 99.9 F | SYSTOLIC BLOOD PRESSURE: 137 MMHG | WEIGHT: 152 LBS

## 2024-02-13 DIAGNOSIS — J01.90 ACUTE SINUSITIS WITH SYMPTOMS > 10 DAYS: Primary | ICD-10-CM

## 2024-02-13 PROCEDURE — 99213 OFFICE O/P EST LOW 20 MIN: CPT | Performed by: FAMILY MEDICINE

## 2024-02-13 RX ORDER — DOXYCYCLINE 100 MG/1
100 CAPSULE ORAL 2 TIMES DAILY
Qty: 14 CAPSULE | Refills: 0 | Status: SHIPPED | OUTPATIENT
Start: 2024-02-13 | End: 2024-02-20

## 2024-02-14 NOTE — PROGRESS NOTES
"Chief Complaint   Patient presents with    Urgent Care    Headache     Pt in clinic to have eval for recurrent headache.         June was seen today for urgent care and headache.    Diagnoses and all orders for this visit:    Acute sinusitis with symptoms > 10 days  -     doxycycline hyclate (VIBRAMYCIN) 100 MG capsule; Take 1 capsule (100 mg) by mouth 2 times daily for 7 days        ASSESSMENT:  Sinusitis  See diagnosis    PLAN:  See orders  Follow up with primary clinic if not improving  Continue on saline nasal spray followed by Flonase.  Apply warm packs to the sinuses.  Continue on the antibiotic as directed symptoms do not get better over the next 3 days should follow-up.  SUBJECTIVE:  June Ronquillo is a 53 year old male here with concerns about sinus infection and headache   Has been noticing frontal headache for 5 days on the right side ,and also sinus congestion with drainage green in color for several weeks  He has had frontal pressure. Course of illness is worsening. Severity moderate  Current and Associated symptoms: nasal congestion and facial pain/pressure  Predisposing factors include HX of recurrent sinusitis. Recent treatment has included: OTC meds    Past Medical History:   Diagnosis Date    CAD (coronary artery disease)     s/p stent to CFX    Heart attack (H)     Hyperlipidemia LDL goal <100     Hypertension     Stented coronary artery     Thyroid disease      Social History     Tobacco Use    Smoking status: Never    Smokeless tobacco: Never   Substance Use Topics    Alcohol use: Not Currently       ROS:  Review of systems negative except as stated above.    OBJECTIVE:  /86   Pulse 83   Temp 99.9  F (37.7  C) (Temporal)   Ht 1.676 m (5' 6\")   Wt 68.9 kg (152 lb)   SpO2 99%   BMI 24.53 kg/m    Exam:GENERAL APPEARANCE: healthy, alert and no distress  EYES: EOMI,  PERRL, conjunctiva clear  HENT: ear canals and TM's normal.  Nose- nasal mucosa edematous  and mouth without " ulcers, erythema or lesions  NECK: supple, nontender, no lymphadenopathy  RESP: lungs clear to auscultation - no rales, rhonchi or wheezes  CV: regular rates and rhythm, normal S1 S2, no murmur noted  PSYCH: mentation appears normal    Sandra Cannon MD

## 2024-02-14 NOTE — PATIENT INSTRUCTIONS
Please finish the antibiotic even if you are feeling better.  Watch for worsening signs of infection    Sandra Cannon MD

## 2024-02-15 ENCOUNTER — TELEPHONE (OUTPATIENT)
Dept: ONCOLOGY | Facility: CLINIC | Age: 54
End: 2024-02-15
Payer: COMMERCIAL

## 2024-02-15 NOTE — ORAL ONC MGMT
Oral Chemotherapy Monitoring Program     Medication: Revlimid   Rx: 10mg PO daily on days 1 through 21 of 28 day cycle   [id number]  Routine survey questions reviewed.   [facility]

## 2024-02-18 LAB — LOWER EUS: NORMAL

## 2024-02-20 ENCOUNTER — TELEPHONE (OUTPATIENT)
Dept: ONCOLOGY | Facility: CLINIC | Age: 54
End: 2024-02-20
Payer: COMMERCIAL

## 2024-02-20 NOTE — TELEPHONE ENCOUNTER
Prior Authorization Approval    Medication: REVLIMID 10 MG PO CAPS  Authorization Effective Date: 2/20/2024  Authorization Expiration Date: 2/20/2025  Approved Dose/Quantity:   Reference #:     Insurance Company: [facility] [employer] - Phone [phone] Fax [phone]  Expected CoPay: $    CoPay Card Available:      Financial Assistance Needed:   Which Pharmacy is filling the prescription:    Pharmacy Notified:   Patient Notified:         Yu Goldberg CPOncology Pharmacy Liaison     [facility] [address] [address]  Office: [phone]  Fax: [phone]  [email or web address]

## 2024-02-20 NOTE — TELEPHONE ENCOUNTER
PA Initiation    Medication: REVLIMID 10 MG PO CAPS  Insurance Company: [facility] [employer] - Phone [phone] Fax [phone]  Pharmacy Filling the Rx:    Filling Pharmacy Phone:    Filling Pharmacy Fax:    Start Date: 2/20/2024        Yu Goldberg CPhTOncology Pharmacy [facility]iaUN[facility] [address] [address]  Office: [phone]  Fax: [phone]  [email or web address]

## 2024-02-21 ENCOUNTER — TELEPHONE (OUTPATIENT)
Dept: ENDOCRINOLOGY | Facility: CLINIC | Age: 54
End: 2024-02-21
Payer: COMMERCIAL

## 2024-02-21 DIAGNOSIS — R79.89 LOW TESTOSTERONE IN MALE: ICD-10-CM

## 2024-02-21 RX ORDER — TESTOSTERONE CYPIONATE 200 MG/ML
150 INJECTION, SOLUTION INTRAMUSCULAR
Qty: 10 ML | Refills: 0 | Status: SHIPPED | OUTPATIENT
Start: 2024-02-21 | End: 2024-08-12

## 2024-02-21 NOTE — TELEPHONE ENCOUNTER
Spoke to pt and informed that pharmacy only has 10mL- this would be a 6 month supply pt would only need 1 bottle. Pt verbalized understanding and is okay with proceeding.

## 2024-02-21 NOTE — TELEPHONE ENCOUNTER
Health Call Center    Phone Message    May a detailed message be left on voicemail: yes     Reason for Call: Medication Question or concern regarding medication   Prescription Clarification  Name of Medication: testosterone cypionate (DEPOTESTOSTERONE) 200 MG/ML injection   Prescribing Provider: Dr. Elder   Pharmacy:   [place], [place] - [address]      What on the order needs clarification? Pharmacy requesting call back to discuss the above medication

## 2024-02-21 NOTE — TELEPHONE ENCOUNTER
Spoke to pharmacy- states they can only get in the 10 mL vial. Would like the 10mL to be ordered instead of the 1mL.     Pt is taking 0.75mL every 2 weeks

## 2024-02-26 SDOH — HEALTH STABILITY: PHYSICAL HEALTH: ON AVERAGE, HOW MANY DAYS PER WEEK DO YOU ENGAGE IN MODERATE TO STRENUOUS EXERCISE (LIKE A BRISK WALK)?: 2 DAYS

## 2024-02-26 SDOH — HEALTH STABILITY: PHYSICAL HEALTH: ON AVERAGE, HOW MANY MINUTES DO YOU ENGAGE IN EXERCISE AT THIS LEVEL?: 20 MIN

## 2024-02-26 ASSESSMENT — SOCIAL DETERMINANTS OF HEALTH (SDOH): HOW OFTEN DO YOU GET TOGETHER WITH FRIENDS OR RELATIVES?: PATIENT DECLINED

## 2024-02-28 ENCOUNTER — OFFICE VISIT (OUTPATIENT)
Dept: FAMILY MEDICINE | Facility: CLINIC | Age: 54
End: 2024-02-28
Payer: COMMERCIAL

## 2024-02-28 VITALS
SYSTOLIC BLOOD PRESSURE: 133 MMHG | DIASTOLIC BLOOD PRESSURE: 89 MMHG | OXYGEN SATURATION: 99 % | TEMPERATURE: 97.6 F | HEART RATE: 82 BPM | WEIGHT: 153 LBS | BODY MASS INDEX: 25.49 KG/M2 | HEIGHT: 65 IN

## 2024-02-28 DIAGNOSIS — C90.00 MULTIPLE MYELOMA NOT HAVING ACHIEVED REMISSION (H): ICD-10-CM

## 2024-02-28 DIAGNOSIS — H61.21 IMPACTED CERUMEN OF RIGHT EAR: ICD-10-CM

## 2024-02-28 DIAGNOSIS — Z00.00 ROUTINE HISTORY AND PHYSICAL EXAMINATION OF ADULT: Primary | ICD-10-CM

## 2024-02-28 PROCEDURE — 99396 PREV VISIT EST AGE 40-64: CPT | Mod: 25

## 2024-02-28 PROCEDURE — 69209 REMOVE IMPACTED EAR WAX UNI: CPT | Mod: RT

## 2024-02-28 ASSESSMENT — ENCOUNTER SYMPTOMS: HEADACHES: 1

## 2024-02-28 NOTE — PROGRESS NOTES
"Preventive Care Visit  [facility], Nurse Practitioner Primary Care  Feb 28, 2024    Assessment & Plan     Routine history and physical examination of adult  Care gaps addressed. hgbA1C ordered for evaluation of diabetes.   - Hemoglobin A1c; Future    Multiple myeloma not having achieved remission (H)  Followed closely by oncology    Impacted cerumen of right ear  Right ear irrigated for impacted cerumen.   - TN REMOVAL IMPACTED CERUMEN IRRIGATION/LVG UNILAT              BMI  Estimated body mass index is 25.86 kg/m  as calculated from the following:    Height as of this encounter: 1.638 m (5' 4.5\").    Weight as of this encounter: 69.4 kg (153 lb).       Counseling  Appropriate preventive services were discussed with this patient, including applicable screening as appropriate for fall prevention, nutrition, physical activity, Tobacco-use cessation, weight loss and cognition.  Checklist reviewing preventive services available has been given to the patient.  Reviewed patient's diet, addressing concerns and/or questions.   He is at risk for lack of exercise and has been provided with information to increase physical activity for the benefit of his well-being.           Nic Watkins is a 53 year old, presenting for the following:  Physical and Headache        2/28/2024     7:30 AM   Additional Questions   Roomed by manjit stokes        Health Care Directive  Patient does not have a Health Care Directive or Living Will: Discussed advance care planning with patient; however, patient declined at this time.    Headache     Headache due to sinus pressure and resolved with antibiotics used to treat sinusitis.             2/26/2024   General Health   How would you rate your overall physical health? Excellent   Feel stress (tense, anxious, or unable to sleep) Not at all         2/26/2024   Nutrition   Three or more servings of calcium each day? (!) I DON'T KNOW   Diet: " Regular (no restrictions)   How many servings of fruit and vegetables per day? (!) 2-3   How many sweetened beverages each day? 0-1         2/26/2024   Exercise   Days per week of moderate/strenous exercise 2 days   Average minutes spent exercising at this level 20 min   (!) EXERCISE CONCERN      2/26/2024   Social Factors   Frequency of gathering with friends or relatives Patient declined   Worry food won't last until get money to buy more No   Food not last or not have enough money for food? No   Do you have housing?  Yes   Are you worried about losing your housing? No   Lack of transportation? No   Unable to get utilities (heat,electricity)? No         2/26/2024   Fall Risk   Fallen 2 or more times in the past year? No   Trouble with walking or balance? No          2/26/2024   Dental   Dentist two times every year? Yes         2/26/2024   TB Screening   Were you born outside of [place]?  (!) YES       Today's PHQ-2 Score:       2/28/2024     7:20 AM   PHQ-2 ( 1999 [employer])   Q1: Little interest or pleasure in doing things 0   Q2: Feeling down, depressed or hopeless 0   PHQ-2 Score 0   Q1: Little interest or pleasure in doing things Not at all   Q2: Feeling down, depressed or hopeless Not at all   PHQ-2 Score 0           2/26/2024   Substance Use   Alcohol more than 3/day or more than 7/wk No   Do you use any other substances recreationally? No     Social History     Tobacco Use    Smoking status: Never    Smokeless tobacco: Never   Vaping Use    Vaping Use: Never used   Substance Use Topics    Alcohol use: Not Currently    Drug use: Never           2/26/2024   STI Screening   New sexual partner(s) since last STI/HIV test? No   Last PSA:   PSA   Date Value Ref Range Status   04/30/2021 0.45 0 - 4 ug/L Final     Comment:     Assay Method:  Chemiluminescence using Siemens Vista analyzer     PSA Tumor Marker   Date Value Ref Range Status   01/08/2024 1.26 0.00 - 3.50 ng/mL Final     ASCVD Risk   The ASCVD Risk  "score (Serjio ALCALA, et al., 2019) failed to calculate for the following reasons:    The patient has a prior MI or stroke diagnosis           Reviewed and updated as needed this visit by Provider   Tobacco  Allergies  Meds  Problems  Med Hx  Surg Hx  Fam Hx                     Objective    Exam  /89 (BP Location: Left arm, Patient Position: Sitting, Cuff Size: Adult Regular)   Pulse 82   Temp 97.6  F (36.4  C) (Oral)   Ht 1.638 m (5' 4.5\")   Wt 69.4 kg (153 lb)   SpO2 99%   BMI 25.86 kg/m     Estimated body mass index is 25.86 kg/m  as calculated from the following:    Height as of this encounter: 1.638 m (5' 4.5\").    Weight as of this encounter: 69.4 kg (153 lb).    Physical Exam  GENERAL: alert and no distress  EYES: Eyes grossly normal to inspection, PERRL and conjunctivae and sclerae normal  HENT: ear canals and TM's normal, nose and mouth without ulcers or lesions  NECK: no adenopathy, no asymmetry, masses, or scars  RESP: lungs clear to auscultation - no rales, rhonchi or wheezes  CV: regular rate and rhythm, normal S1 S2, no S3 or S4, no murmur, click or rub, no peripheral edema  ABDOMEN: soft, nontender, no hepatosplenomegaly, no masses and bowel sounds normal  MS: no gross musculoskeletal defects noted, no edema  SKIN: no suspicious lesions or rashes  NEURO: Normal strength and tone, mentation intact and speech normal  PSYCH: mentation appears normal, affect normal/bright      Signed Electronically by: PATTY Wells CNP    "

## 2024-02-28 NOTE — PROGRESS NOTES
Patient identified using two patient identifiers.  Ear exam showing wax occlusion completed by provider.  Solution: warm water was placed in the right ear(s) via irrigation tool: elephant ear.  Tony Noel MA on 2/28/2024 at 9:16 AM

## 2024-03-01 ENCOUNTER — APPOINTMENT (OUTPATIENT)
Dept: LAB | Facility: CLINIC | Age: 54
End: 2024-03-01
Attending: STUDENT IN AN ORGANIZED HEALTH CARE EDUCATION/TRAINING PROGRAM
Payer: COMMERCIAL

## 2024-03-01 ENCOUNTER — INFUSION THERAPY VISIT (OUTPATIENT)
Dept: ONCOLOGY | Facility: CLINIC | Age: 54
End: 2024-03-01
Attending: STUDENT IN AN ORGANIZED HEALTH CARE EDUCATION/TRAINING PROGRAM
Payer: COMMERCIAL

## 2024-03-01 VITALS
DIASTOLIC BLOOD PRESSURE: 82 MMHG | WEIGHT: 154.9 LBS | OXYGEN SATURATION: 99 % | TEMPERATURE: 97.8 F | SYSTOLIC BLOOD PRESSURE: 158 MMHG | HEART RATE: 89 BPM | RESPIRATION RATE: 19 BRPM | BODY MASS INDEX: 26.18 KG/M2

## 2024-03-01 DIAGNOSIS — Z00.00 ROUTINE HISTORY AND PHYSICAL EXAMINATION OF ADULT: ICD-10-CM

## 2024-03-01 DIAGNOSIS — C90.00 MULTIPLE MYELOMA NOT HAVING ACHIEVED REMISSION (H): Primary | ICD-10-CM

## 2024-03-01 DIAGNOSIS — C90.00 MULTIPLE MYELOMA NOT HAVING ACHIEVED REMISSION (H): ICD-10-CM

## 2024-03-01 LAB
ALBUMIN SERPL BCG-MCNC: 4.3 G/DL (ref 3.5–5.2)
ALP SERPL-CCNC: 57 U/L (ref 40–150)
ALT SERPL W P-5'-P-CCNC: 11 U/L (ref 0–70)
ANION GAP SERPL CALCULATED.3IONS-SCNC: 11 MMOL/L (ref 7–15)
AST SERPL W P-5'-P-CCNC: 23 U/L (ref 0–45)
BASOPHILS # BLD AUTO: 0.1 10E3/UL (ref 0–0.2)
BASOPHILS NFR BLD AUTO: 2 %
BILIRUB SERPL-MCNC: 0.3 MG/DL
BUN SERPL-MCNC: 11.7 MG/DL (ref 6–20)
CALCIUM SERPL-MCNC: 8.6 MG/DL (ref 8.6–10)
CHLORIDE SERPL-SCNC: 106 MMOL/L (ref 98–107)
CREAT SERPL-MCNC: 1.19 MG/DL (ref 0.67–1.17)
DEPRECATED HCO3 PLAS-SCNC: 22 MMOL/L (ref 22–29)
EGFRCR SERPLBLD CKD-EPI 2021: 72 ML/MIN/1.73M2
EOSINOPHIL # BLD AUTO: 0.2 10E3/UL (ref 0–0.7)
EOSINOPHIL NFR BLD AUTO: 4 %
ERYTHROCYTE [DISTWIDTH] IN BLOOD BY AUTOMATED COUNT: 15.2 % (ref 10–15)
GLUCOSE SERPL-MCNC: 139 MG/DL (ref 70–99)
HBA1C MFR BLD: 5.9 %
HCT VFR BLD AUTO: 37.3 % (ref 40–53)
HGB BLD-MCNC: 12 G/DL (ref 13.3–17.7)
IMM GRANULOCYTES # BLD: 0 10E3/UL
IMM GRANULOCYTES NFR BLD: 0 %
LYMPHOCYTES # BLD AUTO: 1.3 10E3/UL (ref 0.8–5.3)
LYMPHOCYTES NFR BLD AUTO: 27 %
MCH RBC QN AUTO: 28.6 PG (ref 26.5–33)
MCHC RBC AUTO-ENTMCNC: 32.2 G/DL (ref 31.5–36.5)
MCV RBC AUTO: 89 FL (ref 78–100)
MONOCYTES # BLD AUTO: 0.5 10E3/UL (ref 0–1.3)
MONOCYTES NFR BLD AUTO: 10 %
NEUTROPHILS # BLD AUTO: 2.7 10E3/UL (ref 1.6–8.3)
NEUTROPHILS NFR BLD AUTO: 57 %
NRBC # BLD AUTO: 0 10E3/UL
NRBC BLD AUTO-RTO: 0 /100
PLATELET # BLD AUTO: 392 10E3/UL (ref 150–450)
POTASSIUM SERPL-SCNC: 3.5 MMOL/L (ref 3.4–5.3)
PROT SERPL-MCNC: 6.2 G/DL (ref 6.4–8.3)
RBC # BLD AUTO: 4.19 10E6/UL (ref 4.4–5.9)
SODIUM SERPL-SCNC: 139 MMOL/L (ref 135–145)
WBC # BLD AUTO: 4.7 10E3/UL (ref 4–11)

## 2024-03-01 PROCEDURE — 99215 OFFICE O/P EST HI 40 MIN: CPT | Performed by: REGISTERED NURSE

## 2024-03-01 PROCEDURE — 96401 CHEMO ANTI-NEOPL SQ/IM: CPT

## 2024-03-01 PROCEDURE — 86334 IMMUNOFIX E-PHORESIS SERUM: CPT | Performed by: PATHOLOGY

## 2024-03-01 PROCEDURE — 84155 ASSAY OF PROTEIN SERUM: CPT | Mod: 91

## 2024-03-01 PROCEDURE — 99213 OFFICE O/P EST LOW 20 MIN: CPT | Performed by: REGISTERED NURSE

## 2024-03-01 PROCEDURE — 84165 PROTEIN E-PHORESIS SERUM: CPT | Mod: TC | Performed by: PATHOLOGY

## 2024-03-01 PROCEDURE — 84165 PROTEIN E-PHORESIS SERUM: CPT | Mod: 26 | Performed by: PATHOLOGY

## 2024-03-01 PROCEDURE — 85025 COMPLETE CBC W/AUTO DIFF WBC: CPT

## 2024-03-01 PROCEDURE — 80053 COMPREHEN METABOLIC PANEL: CPT

## 2024-03-01 PROCEDURE — 83036 HEMOGLOBIN GLYCOSYLATED A1C: CPT | Performed by: REGISTERED NURSE

## 2024-03-01 PROCEDURE — 250N000011 HC RX IP 250 OP 636: Mod: JZ | Performed by: REGISTERED NURSE

## 2024-03-01 PROCEDURE — 36415 COLL VENOUS BLD VENIPUNCTURE: CPT

## 2024-03-01 PROCEDURE — 82784 ASSAY IGA/IGD/IGG/IGM EACH: CPT

## 2024-03-01 PROCEDURE — 83521 IG LIGHT CHAINS FREE EACH: CPT | Mod: 59

## 2024-03-01 PROCEDURE — 86334 IMMUNOFIX E-PHORESIS SERUM: CPT | Mod: 26 | Performed by: PATHOLOGY

## 2024-03-01 RX ORDER — MEPERIDINE HYDROCHLORIDE 25 MG/ML
25 INJECTION INTRAMUSCULAR; INTRAVENOUS; SUBCUTANEOUS EVERY 30 MIN PRN
Status: CANCELLED | OUTPATIENT
Start: 2024-03-08

## 2024-03-01 RX ORDER — ALBUTEROL SULFATE 90 UG/1
1-2 AEROSOL, METERED RESPIRATORY (INHALATION)
Status: CANCELLED
Start: 2024-03-08

## 2024-03-01 RX ORDER — HEPARIN SODIUM (PORCINE) LOCK FLUSH IV SOLN 100 UNIT/ML 100 UNIT/ML
5 SOLUTION INTRAVENOUS
Status: CANCELLED | OUTPATIENT
Start: 2024-03-08

## 2024-03-01 RX ORDER — EPINEPHRINE 1 MG/ML
0.3 INJECTION, SOLUTION INTRAMUSCULAR; SUBCUTANEOUS EVERY 5 MIN PRN
Status: CANCELLED | OUTPATIENT
Start: 2024-03-08

## 2024-03-01 RX ORDER — HEPARIN SODIUM,PORCINE 10 UNIT/ML
5 VIAL (ML) INTRAVENOUS
Status: CANCELLED | OUTPATIENT
Start: 2024-03-08

## 2024-03-01 RX ORDER — DIPHENHYDRAMINE HCL 25 MG
50 CAPSULE ORAL ONCE
Status: CANCELLED
Start: 2024-03-08 | End: 2024-03-08

## 2024-03-01 RX ORDER — ACETAMINOPHEN 325 MG/1
650 TABLET ORAL ONCE
Status: CANCELLED
Start: 2024-03-08 | End: 2024-03-08

## 2024-03-01 RX ORDER — ALBUTEROL SULFATE 0.83 MG/ML
2.5 SOLUTION RESPIRATORY (INHALATION)
Status: CANCELLED | OUTPATIENT
Start: 2024-03-08

## 2024-03-01 RX ORDER — DIPHENHYDRAMINE HYDROCHLORIDE 50 MG/ML
50 INJECTION INTRAMUSCULAR; INTRAVENOUS
Status: CANCELLED
Start: 2024-03-08

## 2024-03-01 RX ORDER — METHYLPREDNISOLONE SODIUM SUCCINATE 125 MG/2ML
125 INJECTION, POWDER, LYOPHILIZED, FOR SOLUTION INTRAMUSCULAR; INTRAVENOUS
Status: CANCELLED
Start: 2024-03-08

## 2024-03-01 RX ORDER — DEXAMETHASONE 4 MG/1
12 TABLET ORAL ONCE
Status: CANCELLED
Start: 2024-03-08 | End: 2024-03-08

## 2024-03-01 RX ORDER — NALOXONE HYDROCHLORIDE 0.4 MG/ML
0.2 INJECTION, SOLUTION INTRAMUSCULAR; INTRAVENOUS; SUBCUTANEOUS
Status: CANCELLED | OUTPATIENT
Start: 2024-03-08

## 2024-03-01 RX ADMIN — DARATUMUMAB AND HYALURONIDASE-FIHJ (HUMAN RECOMBINANT) 1800 MG: 1800; 30000 INJECTION SUBCUTANEOUS at 15:14

## 2024-03-01 ASSESSMENT — PAIN SCALES - GENERAL: PAINLEVEL: MILD PAIN (2)

## 2024-03-01 NOTE — PROGRESS NOTES
[facility]  Date of visit: 03/01/2024        Oncologic History  - 4/30/2021 M spike of 34.8 in urine.M spike in blood 0.2; IgG 702 with depressed IgA and IgM. Beta 2 microglobulin 2.7. Lambda  with K/L ratio of 0.01. WBC 11.1 with absolute eos of 1.0. hgb, plt, Cr (1.1), and albumin WNL.    -4/30/2021 CT abd/pelvis showed sclerotic marrow changes with numerous lytic appearing lesions throughout the imaged portions of the spine, pelvis and ribs.      -PET scan 5/11/2021 Numerous osteolytic lesions which predominantly demonstrate uptake below liver background levels. There is one intraosseous lesion in the left lateral 9th rib that demonstrates uptake above background, possibly due to nondisplaced fracture. Adjacent to this lesion is mild hypermetabolism to the left pleura, with new small left pleural effusion, suspicious for malignant effusion versus posttraumatic effusion. Pleural uptake may represent extramedullary myeloma extending along the intercostal space versus inflammation.    - BM bx 5/17/2021 with flow showing 4.0% plasma cells which express CD19 (dim), CD38 (bright), CD45 (dim), , and monotypic cytoplasmic lambda immunoglobulin light chains but lack CD20 and CD56.  Hypercellular bone marrow for age (approximately 75% cellularity) with adequate trilineage hematopoiesis. Plasma cell infiltrate: Interstitial, lambda monotypic and representing approximately 60% the bone marrow cellularity, by  immunohistochemical stain. Cytpgenetics with 2 related hypodiploid clones. One with loss of Y, monosomy 13 and 14 (5%) and one with translocation of 8p and 14, derivative 16 with long arm replaced by extra copy 1q,monosomy 21. FISH showed gain of 1q, loss of 13 and 14, IGH-MYC fusion t(8:14)    - Started Miracle-RVd 21 day with velcade on days 1,8,15.     -Cycle 2 Miracle-RVd. Dose reduce dex to 80 mg d/t fatigue and stomach upset on dex days. Also having cough with basilar  streaking on CXR    - 8/31/2021 BM bx -rare suspicious plasma cells in touch imprint. No increase in plasma cells by morph.    -8/31/2021 PET/CT Diffuse osteolytic lesions throughout the axial and appendicular skeleton. Increased sclerosis since prior exam 5/11/2021 without increased metabolism or size.  Hypermetabolic pretracheal lymph node as well as hypermetabolic level 2 lymph nodes within the right neck. These lymph nodes are likely reactive from autoimmune activation via medical therapy. Hypermetabolic subcentimeter nodules within the thyroid gland    - 11/11/2021 Autologous BMT    - 2/21/22 started maintenance with Revlimid 10 mg and daratumumab monthly; did Revlimid alone for C1 due to low IgG levels, added daratumumab starting C2.  Initially did Revlimid daily, decreased to 21/28 days in Nov 2023 to help minimize side effects    Interval Hx:   June presents for toxicity evaluation prior to receiving vidya+Revlimid maintenance  today.  Doing well.  Denies any new symptoms or concerns.  He is eating and drinking without issue and good appetite.    Had a sinus infection this past month, was treated with antibiotics, feels fully recovered at this time.    Denies fevers/chills, SOB/cough, chest pain, bleeding issues,  N/V,  constipation, rashes/sores, night sweats, new pains, new lumps/bumps. edema, mouth sores.    Physical Exam:    BP (!) 158/82 (BP Location: Right arm, Patient Position: Sitting, Cuff Size: Adult Regular)   Pulse 89   Temp 97.8  F (36.6  C) (Oral)   Resp 19   Wt 70.3 kg (154 lb 14.4 oz)   SpO2 99%   BMI 26.18 kg/m      Wt Readings from Last 5 Encounters:   03/01/24 70.3 kg (154 lb 14.4 oz)   02/28/24 69.4 kg (153 lb)   02/13/24 68.9 kg (152 lb)   02/08/24 68.7 kg (151 lb 7.3 oz)   02/02/24 68.4 kg (150 lb 14.4 oz)       Constitutional: Appears stated age, well-groomed.  Accompanied by self. In no apparent distress.   HEENT: Normocephilic. EOMI, PERRL. Sclerae are anicteric.  "  Respiratory: No increased work of breathing, good air exchange, clear to auscultation bilaterally, no crackles or wheezing.  Cardiovascular: Normal apical impulse, regular rate and rhythm, normal S1 and S2, and no murmur noted.  GI: +BS. Soft. No tenderness on palpation.  Skin: No bruising or bleeding, normal skin color, texture, turgor, no redness, warmth, or swelling, no rashes, no lesions, no jaundice.  Extremities: No bilateral lower extremity edema.  Neurologic: Awake, alert, oriented to name, place and time.    Vascular access: None      Allergies   Allergen Reactions    Blood Transfusion Related (Informational Only) Other (See Comments)     Patient has a history of a clinically significant antibody against RBC antigens.  A delay in compatible RBCs may occur.      Current Outpatient Medications   Medication    acyclovir (ZOVIRAX) 400 MG tablet    albuterol (PROAIR HFA/PROVENTIL HFA/VENTOLIN HFA) 108 (90 Base) MCG/ACT inhaler    aspirin (ASA) 81 MG EC tablet    calcium carbonate-vitamin D (OSCAL) 500-5 MG-MCG tablet    LENalidomide (REVLIMID) 10 MG CAPS capsule    levothyroxine (SYNTHROID/LEVOTHROID) 125 MCG tablet    Needle, Disp, (B-D BLUNT FILL NEEDLE) 18G X 1-1/2\" MISC    Needle, Disp, (BD DISP NEEDLE) 23G X 1\" MISC    potassium chloride ER (KLOR-CON M) 10 MEQ CR tablet    rosuvastatin (CRESTOR) 40 MG tablet    syringe, disposable, 1 ML MISC    testosterone cypionate (DEPOTESTOSTERONE) 200 MG/ML injection    vitamin B complex with vitamin C (STRESS TAB) tablet     No current facility-administered medications for this visit.     Most Recent 3 CBC's:  Recent Labs   Lab Test 03/01/24  1416 02/02/24  1317 01/05/24  1418   WBC 4.7 6.2 4.8   HGB 12.0* 12.0* 11.7*   MCV 89 89 91    334 363   ANEUTAUTO 2.7 3.5 2.5     Most Recent 3 BMP's:  Recent Labs   Lab Test 02/02/24  1317 12/15/23  1329 11/16/23  0926    139 136   POTASSIUM 4.0 3.8 4.1   CHLORIDE 105 105 106   CO2 23 24 22   BUN 13.1 9.4 8.5 "   CR 1.16 1.14 1.13   ANIONGAP 9 10 8   HARDEEP 9.2 8.7 8.4*   * 98 96   PROTTOTAL 6.4 6.5 6.4   ALBUMIN 4.4 4.4 4.3    Most Recent 3 LFT's:  Recent Labs   Lab Test 02/02/24  1317 12/15/23  1329 11/16/23  0926   AST 24 27 34   ALT 15 13 20   ALKPHOS 59 60 57   BILITOTAL 0.4 0.5 0.4    Most Recent 2 TSH and T4:  Recent Labs   Lab Test 01/08/24  0713 06/26/23  1442 12/19/22  1103 06/17/22  1332 11/14/21  1103 10/20/21  1145   TSH 0.86 0.58   < > 0.07*   < > 0.70   T4  --   --   --  1.36  --  0.97    < > = values in this interval not displayed.     I reviewed the above labs today.    Assessment and Plan  Multiple myeloma with high risk cytogenetics  - Miracle-RVD with VGPR, then underwent autologous transplant 11/11/21.   - Given his high risk cytogenetics he started miracle + revlimid maintenance therapy 2/21/22.  - Completed 2 years of zometa in Sept 2023. Continue Vit D.   - Revlimid was decreased to 21/28 days in Nov 2023 to try to minimize side effects (dysgeusia, flu-like symptoms on days following daratumumab)  - Continue miracle + revlimid  - Continue acyclovir daily for viral ppx   - Received influenza vaccine 12/15/23  - COVID + 10/19/23.  Received COVID booster 02/02/24   - Pneumococcal vaccine given 02/02/24   - Intends to pursue his Shingrix vaccine through his pharmacy in the near future    Hypogammaglobulinemia   Start monthly IV Ig if IgG <300  - 11/16/23 IgG 438  - 2/2/24 IgG 465    Normocytic Anemia:   - Likely d/t revlimid and daratumumab.   - Will keep monitoring and dose reduce if needed.      Elevated BP:    -Previously discussed his elevated BP in clinic and recommended following with his PCP.    - He has been taking his BP at home and they are within a normal range.        20 minutes spent on the date of the encounter doing chart review, review of test results, interpretation of tests, patient visit, and documentation       PATTY Meza [facility] [place] [address] [facility] [place] [address]  [phone]

## 2024-03-01 NOTE — PROGRESS NOTES
Infusion Nursing Note:  June Ronquillo presents today for Cycle 28, Day 1- Darzalex-Faspro Injection.    Patient seen by provider today: Yes: Laura Ballesteros CNP   present during visit today: Not Applicable.    Note: Patient reports feeling well on arrival to infusion suite today. Denies signs of infection: no fever, cough, chills, rash, chest pain, or shortness of breath. No new concerns or changes since DOROTA appointment. Wishes to proceed with treatment today.       Intravenous Access:  No Intravenous access/labs at this visit.    Treatment Conditions:  Lab Results   Component Value Date    HGB 12.0 (L) 03/01/2024    WBC 4.7 03/01/2024    ANEU 4.7 03/13/2023    ANEUTAUTO 2.7 03/01/2024     03/01/2024        Lab Results   Component Value Date     02/02/2024    POTASSIUM 4.0 02/02/2024    MAG 2.6 (H) 04/28/2023    CR 1.16 02/02/2024    HARDEEP 9.2 02/02/2024    BILITOTAL 0.4 02/02/2024    ALBUMIN 4.4 02/02/2024    ALT 15 02/02/2024    AST 24 02/02/2024       Results reviewed, labs MET treatment parameters, ok to proceed with treatment.      Post Infusion Assessment:  Patient tolerated Darzalex-Faspro injection to the right lower abdomen without incident.       Discharge Plan:   Patient declined prescription refills.  Discharge instructions reviewed with: Patient.  Patient and/or family verbalized understanding of discharge instructions and all questions answered.  AVS to patient via [employer].  Patient will return 3/29/24 for next appointment.   Patient discharged in stable condition accompanied by: self.  Departure Mode: Ambulatory.      Kendal Lees, RN

## 2024-03-01 NOTE — NURSING NOTE
"Oncology Rooming Note    March 1, 2024 2:24 PM   June Ronquillo is a 53 year old male who presents for:    Chief Complaint   Patient presents with    Blood Draw     Labs drawn via  by RN.     Oncology Clinic Visit     Multiple myeloma not having achieved remission     Initial Vitals: BP (!) 158/82 (BP Location: Right arm, Patient Position: Sitting, Cuff Size: Adult Regular)   Pulse 89   Temp 97.8  F (36.6  C) (Oral)   Resp 19   Wt 70.3 kg (154 lb 14.4 oz)   SpO2 99%   BMI 26.18 kg/m   Estimated body mass index is 26.18 kg/m  as calculated from the following:    Height as of 2/28/24: 1.638 m (5' 4.5\").    Weight as of this encounter: 70.3 kg (154 lb 14.4 oz). Body surface area is 1.79 meters squared.  Mild Pain (2) Comment: Data Unavailable   No LMP for male patient.  Allergies reviewed: Yes  Medications reviewed: Yes    Medications: Medication refills not needed today.  Pharmacy name entered into [employer]:    [facility] [place], [place] - [address]  [facility] - [place], [place] - [address] [facility] [place], [place] [address]  [facility] [place], [place] - [address] [facility]  [facility] - [place], [place] - [address]    Frailty Screening:   Is the patient here for a new oncology consult visit in cancer care? 2. No      Clinical concerns: none       Bianca Calderon              "

## 2024-03-01 NOTE — LETTER
3/1/2024         RE: June Ronquillo  [address]  [facility] [address]        Dear Colleague,    Thank you for referring your patient, June Ronquillo, to the [facility]. Please see a copy of my visit note below.    [facility]  Date of visit: 03/01/2024        Oncologic History  - 4/30/2021 M spike of 34.8 in urine.M spike in blood 0.2; IgG 702 with depressed IgA and IgM. Beta 2 microglobulin 2.7. Lambda  with K/L ratio of 0.01. WBC 11.1 with absolute eos of 1.0. hgb, plt, Cr (1.1), and albumin WNL.    -4/30/2021 CT abd/pelvis showed sclerotic marrow changes with numerous lytic appearing lesions throughout the imaged portions of the spine, pelvis and ribs.      -PET scan 5/11/2021 Numerous osteolytic lesions which predominantly demonstrate uptake below liver background levels. There is one intraosseous lesion in the left lateral 9th rib that demonstrates uptake above background, possibly due to nondisplaced fracture. Adjacent to this lesion is mild hypermetabolism to the left pleura, with new small left pleural effusion, suspicious for malignant effusion versus posttraumatic effusion. Pleural uptake may represent extramedullary myeloma extending along the intercostal space versus inflammation.    - BM bx 5/17/2021 with flow showing 4.0% plasma cells which express CD19 (dim), CD38 (bright), CD45 (dim), , and monotypic cytoplasmic lambda immunoglobulin light chains but lack CD20 and CD56.  Hypercellular bone marrow for age (approximately 75% cellularity) with adequate trilineage hematopoiesis. Plasma cell infiltrate: Interstitial, lambda monotypic and representing approximately 60% the bone marrow cellularity, by  immunohistochemical stain. Cytpgenetics with 2 related hypodiploid clones. One with loss of Y, monosomy 13 and 14 (5%) and one with translocation of 8p and 14, derivative 16 with long arm replaced by extra  copy 1q,monosomy 21. FISH showed gain of 1q, loss of 13 and 14, IGH-MYC fusion t(8:14)    - Started Miracle-RVd 21 day with velcade on days 1,8,15.     -Cycle 2 Miracle-RVd. Dose reduce dex to 80 mg d/t fatigue and stomach upset on dex days. Also having cough with basilar streaking on CXR    - 8/31/2021 BM bx -rare suspicious plasma cells in touch imprint. No increase in plasma cells by morph.    -8/31/2021 PET/CT Diffuse osteolytic lesions throughout the axial and appendicular skeleton. Increased sclerosis since prior exam 5/11/2021 without increased metabolism or size.  Hypermetabolic pretracheal lymph node as well as hypermetabolic level 2 lymph nodes within the right neck. These lymph nodes are likely reactive from autoimmune activation via medical therapy. Hypermetabolic subcentimeter nodules within the thyroid gland    - 11/11/2021 Autologous BMT    - 2/21/22 started maintenance with Revlimid 10 mg and daratumumab monthly; did Revlimid alone for C1 due to low IgG levels, added daratumumab starting C2.  Initially did Revlimid daily, decreased to 21/28 days in Nov 2023 to help minimize side effects    Interval Hx:   June presents for toxicity evaluation prior to receiving miracle+Revlimid maintenance  today.  Doing well.  Denies any new symptoms or concerns.  He is eating and drinking without issue and good appetite.    Had a sinus infection this past month, was treated with antibiotics, feels fully recovered at this time.    Denies fevers/chills, SOB/cough, chest pain, bleeding issues,  N/V,  constipation, rashes/sores, night sweats, new pains, new lumps/bumps. edema, mouth sores.    Physical Exam:    BP (!) 158/82 (BP Location: Right arm, Patient Position: Sitting, Cuff Size: Adult Regular)   Pulse 89   Temp 97.8  F (36.6  C) (Oral)   Resp 19   Wt 70.3 kg (154 lb 14.4 oz)   SpO2 99%   BMI 26.18 kg/m      Wt Readings from Last 5 Encounters:   03/01/24 70.3 kg (154 lb 14.4 oz)   02/28/24 69.4 kg (153 lb)  "  02/13/24 68.9 kg (152 lb)   02/08/24 68.7 kg (151 lb 7.3 oz)   02/02/24 68.4 kg (150 lb 14.4 oz)       Constitutional: Appears stated age, well-groomed.  Accompanied by self. In no apparent distress.   HEENT: Normocephilic. EOMI, PERRL. Sclerae are anicteric.   Respiratory: No increased work of breathing, good air exchange, clear to auscultation bilaterally, no crackles or wheezing.  Cardiovascular: Normal apical impulse, regular rate and rhythm, normal S1 and S2, and no murmur noted.  GI: +BS. Soft. No tenderness on palpation.  Skin: No bruising or bleeding, normal skin color, texture, turgor, no redness, warmth, or swelling, no rashes, no lesions, no jaundice.  Extremities: No bilateral lower extremity edema.  Neurologic: Awake, alert, oriented to name, place and time.    Vascular access: None      Allergies   Allergen Reactions    Blood Transfusion Related (Informational Only) Other (See Comments)     Patient has a history of a clinically significant antibody against RBC antigens.  A delay in compatible RBCs may occur.      Current Outpatient Medications   Medication    acyclovir (ZOVIRAX) 400 MG tablet    albuterol (PROAIR HFA/PROVENTIL HFA/VENTOLIN HFA) 108 (90 Base) MCG/ACT inhaler    aspirin (ASA) 81 MG EC tablet    calcium carbonate-vitamin D (OSCAL) 500-5 MG-MCG tablet    LENalidomide (REVLIMID) 10 MG CAPS capsule    levothyroxine (SYNTHROID/LEVOTHROID) 125 MCG tablet    Needle, Disp, (B-D BLUNT FILL NEEDLE) 18G X 1-1/2\" MISC    Needle, Disp, (BD DISP NEEDLE) 23G X 1\" MISC    potassium chloride ER (KLOR-CON M) 10 MEQ CR tablet    rosuvastatin (CRESTOR) 40 MG tablet    syringe, disposable, 1 ML MISC    testosterone cypionate (DEPOTESTOSTERONE) 200 MG/ML injection    vitamin B complex with vitamin C (STRESS TAB) tablet     No current facility-administered medications for this visit.     Most Recent 3 CBC's:  Recent Labs   Lab Test 03/01/24  1416 02/02/24  1317 01/05/24  1418   WBC 4.7 6.2 4.8   HGB 12.0* " 12.0* 11.7*   MCV 89 89 91    334 363   ANEUTAUTO 2.7 3.5 2.5     Most Recent 3 BMP's:  Recent Labs   Lab Test 02/02/24  1317 12/15/23  1329 11/16/23  0926    139 136   POTASSIUM 4.0 3.8 4.1   CHLORIDE 105 105 106   CO2 23 24 22   BUN 13.1 9.4 8.5   CR 1.16 1.14 1.13   ANIONGAP 9 10 8   HARDEEP 9.2 8.7 8.4*   * 98 96   PROTTOTAL 6.4 6.5 6.4   ALBUMIN 4.4 4.4 4.3    Most Recent 3 LFT's:  Recent Labs   Lab Test 02/02/24  1317 12/15/23  1329 11/16/23  0926   AST 24 27 34   ALT 15 13 20   ALKPHOS 59 60 57   BILITOTAL 0.4 0.5 0.4    Most Recent 2 TSH and T4:  Recent Labs   Lab Test 01/08/24  0713 06/26/23  1442 12/19/22  1103 06/17/22  1332 11/14/21  1103 10/20/21  1145   TSH 0.86 0.58   < > 0.07*   < > 0.70   T4  --   --   --  1.36  --  0.97    < > = values in this interval not displayed.     I reviewed the above labs today.    Assessment and Plan  Multiple myeloma with high risk cytogenetics  - Miracle-RVD with VGPR, then underwent autologous transplant 11/11/21.   - Given his high risk cytogenetics he started miracle + revlimid maintenance therapy 2/21/22.  - Completed 2 years of zometa in Sept 2023. Continue Vit D.   - Revlimid was decreased to 21/28 days in Nov 2023 to try to minimize side effects (dysgeusia, flu-like symptoms on days following daratumumab)  - Continue miracle + revlimid  - Continue acyclovir daily for viral ppx   - Received influenza vaccine 12/15/23  - COVID + 10/19/23.  Received COVID booster 02/02/24   - Pneumococcal vaccine given 02/02/24   - Intends to pursue his Shingrix vaccine through his pharmacy in the near future    Hypogammaglobulinemia   Start monthly IV Ig if IgG <300  - 11/16/23 IgG 438  - 2/2/24 IgG 465    Normocytic Anemia:   - Likely d/t revlimid and daratumumab.   - Will keep monitoring and dose reduce if needed.      Elevated BP:    -Previously discussed his elevated BP in clinic and recommended following with his PCP.    - He has been taking his BP at home and they are  within a normal range.        20 minutes spent on the date of the encounter doing chart review, review of test results, interpretation of tests, patient visit, and documentation       PATTY Meza [facility] [address] [address] [phone]

## 2024-03-01 NOTE — NURSING NOTE
Chief Complaint   Patient presents with    Blood Draw     Labs drawn via  by RN.      Labs (including clonoseq kit) drawn with  by RN. Vitals taken. Patient checked into next appointment.    Gianna Santiago RN

## 2024-03-01 NOTE — PATIENT INSTRUCTIONS
[facility] Triage and after hours / weekends / holidays:  [phone]    Please call the triage or after hours line if you experience a temperature greater than or equal to 100.4, shaking chills, have uncontrolled nausea, vomiting and/or diarrhea, dizziness, shortness of breath, chest pain, bleeding, unexplained bruising, or if you have any other new/concerning symptoms, questions or concerns.      If you are having any concerning symptoms or wish to speak to a provider before your next infusion visit, please call triage to notify them so we can adequately serve you.     If you need a refill on a narcotic prescription or other medication, please call before your infusion appointment.

## 2024-03-02 LAB — TOTAL PROTEIN SERUM FOR ELP: 5.8 G/DL (ref 6.4–8.3)

## 2024-03-04 LAB
ALBUMIN SERPL ELPH-MCNC: 4 G/DL (ref 3.7–5.1)
ALPHA1 GLOB SERPL ELPH-MCNC: 0.2 G/DL (ref 0.2–0.4)
ALPHA2 GLOB SERPL ELPH-MCNC: 0.5 G/DL (ref 0.5–0.9)
B-GLOBULIN SERPL ELPH-MCNC: 0.7 G/DL (ref 0.6–1)
GAMMA GLOB SERPL ELPH-MCNC: 0.4 G/DL (ref 0.7–1.6)
IGA SERPL-MCNC: 51 MG/DL (ref 84–499)
IGG SERPL-MCNC: 458 MG/DL (ref 610–1616)
IGM SERPL-MCNC: 24 MG/DL (ref 35–242)
KAPPA LC FREE SER-MCNC: 0.79 MG/DL (ref 0.33–1.94)
KAPPA LC FREE/LAMBDA FREE SER NEPH: 1.61 {RATIO} (ref 0.26–1.65)
LAMBDA LC FREE SERPL-MCNC: 0.49 MG/DL (ref 0.57–2.63)
LOCATION OF TASK: ABNORMAL
LOCATION OF TASK: NORMAL
M PROTEIN SERPL ELPH-MCNC: 0.1 G/DL
PROT PATTERN SERPL ELPH-IMP: ABNORMAL
PROT PATTERN SERPL IFE-IMP: NORMAL

## 2024-03-04 NOTE — RESULT ENCOUNTER NOTE
Aiden Watkins,     Your hgbA1C which gives us an average of your blood sugar over a three month period came back slightly elevated indicating prediabetes. Working on improving your diet and increasing exercise can help prevent you from developing diabetes.      Feel free to contact me via [employer]io or call the clinic at [phone].     Sincerely,  Annette Wang, LAQUITA, FNP-C

## 2024-03-07 DIAGNOSIS — C90.00 MULTIPLE MYELOMA NOT HAVING ACHIEVED REMISSION (H): Primary | ICD-10-CM

## 2024-03-07 RX ORDER — LENALIDOMIDE 10 MG/1
10 CAPSULE ORAL DAILY
Qty: 21 CAPSULE | Refills: 0 | Status: SHIPPED | OUTPATIENT
Start: 2024-03-07 | End: 2024-05-03

## 2024-03-09 LAB
ABC 95% CONFIDENCE INTERVAL (B-CELL): NORMAL
ABC CLONOSEQ B-CELL TRACKING (MRD) RESULT: NORMAL
ABC DOMINANT SEQUENCES (B-CELL): 3
ABC RESIDUAL CLONAL CELLS/ MILL NUCLEATED CELLS BCELL: 0

## 2024-03-17 ENCOUNTER — MYC REFILL (OUTPATIENT)
Dept: ENDOCRINOLOGY | Facility: CLINIC | Age: 54
End: 2024-03-17
Payer: COMMERCIAL

## 2024-03-17 DIAGNOSIS — E06.3 HYPOTHYROIDISM DUE TO HASHIMOTO'S THYROIDITIS: ICD-10-CM

## 2024-03-18 RX ORDER — LEVOTHYROXINE SODIUM 125 UG/1
125 TABLET ORAL DAILY
Qty: 90 TABLET | Refills: 1 | OUTPATIENT
Start: 2024-03-18

## 2024-03-18 NOTE — TELEPHONE ENCOUNTER
Requested Prescriptions   Pending Prescriptions Disp Refills    levothyroxine (SYNTHROID/LEVOTHROID) 125 MCG tablet 90 tablet 1     Sig: Take 1 tablet (125 mcg) by mouth daily       Thyroid Protocol Passed - 3/17/2024  7:44 PM        Passed - Patient is 12 years or older        Passed - Recent (12 mo) or future (30 days) visit within the authorizing provider's specialty     The patient must have completed an in-person or virtual visit within the past 12 months or has a future visit scheduled within the next 90 days with the authorizing provider s specialty.  Urgent care and e-visits do not quality as an office visit for this protocol.          Passed - Medication is active on med list        Passed - Normal TSH on file in past 12 months     Recent Labs   Lab Test 01/08/24  0713   TSH 0.86

## 2024-03-19 ENCOUNTER — TELEPHONE (OUTPATIENT)
Dept: ONCOLOGY | Facility: CLINIC | Age: 54
End: 2024-03-19
Payer: COMMERCIAL

## 2024-03-19 NOTE — TELEPHONE ENCOUNTER
Oral Chemotherapy Monitoring Program   Medication: Revlimid  Rx: 10mg PO daily on days 1 through 21 of 28 day cycle   Auth #: [id number]   Risk Category: Adult Male  Routine survey questions reviewed.   [facility]    Yu Goldberg  [facility]  Oncology Pharmacy Liaison   [email or web address]  [phone] (phone)  [phone] (fax)

## 2024-03-29 ENCOUNTER — APPOINTMENT (OUTPATIENT)
Dept: LAB | Facility: CLINIC | Age: 54
End: 2024-03-29
Attending: STUDENT IN AN ORGANIZED HEALTH CARE EDUCATION/TRAINING PROGRAM
Payer: COMMERCIAL

## 2024-03-29 ENCOUNTER — ONCOLOGY VISIT (OUTPATIENT)
Dept: ONCOLOGY | Facility: CLINIC | Age: 54
End: 2024-03-29
Attending: STUDENT IN AN ORGANIZED HEALTH CARE EDUCATION/TRAINING PROGRAM
Payer: COMMERCIAL

## 2024-03-29 VITALS
BODY MASS INDEX: 26.38 KG/M2 | SYSTOLIC BLOOD PRESSURE: 134 MMHG | TEMPERATURE: 98.3 F | OXYGEN SATURATION: 100 % | HEART RATE: 78 BPM | DIASTOLIC BLOOD PRESSURE: 83 MMHG | WEIGHT: 156.1 LBS | RESPIRATION RATE: 16 BRPM

## 2024-03-29 DIAGNOSIS — M25.522 LEFT ELBOW PAIN: ICD-10-CM

## 2024-03-29 DIAGNOSIS — C90.01 MULTIPLE MYELOMA IN REMISSION (H): Primary | ICD-10-CM

## 2024-03-29 DIAGNOSIS — C90.00 MULTIPLE MYELOMA NOT HAVING ACHIEVED REMISSION (H): Primary | ICD-10-CM

## 2024-03-29 LAB
ACANTHOCYTES BLD QL SMEAR: ABNORMAL
ALBUMIN SERPL BCG-MCNC: 4.2 G/DL (ref 3.5–5.2)
ALP SERPL-CCNC: 58 U/L (ref 40–150)
ALT SERPL W P-5'-P-CCNC: 16 U/L (ref 0–70)
ANION GAP SERPL CALCULATED.3IONS-SCNC: 11 MMOL/L (ref 7–15)
AST SERPL W P-5'-P-CCNC: 26 U/L (ref 0–45)
AUER BODIES BLD QL SMEAR: ABNORMAL
BASO STIPL BLD QL SMEAR: ABNORMAL
BASOPHILS # BLD AUTO: 0 10E3/UL (ref 0–0.2)
BASOPHILS NFR BLD AUTO: 1 %
BILIRUB SERPL-MCNC: 0.3 MG/DL
BITE CELLS BLD QL SMEAR: ABNORMAL
BLISTER CELLS BLD QL SMEAR: ABNORMAL
BUN SERPL-MCNC: 12.5 MG/DL (ref 6–20)
BURR CELLS BLD QL SMEAR: SLIGHT
CALCIUM SERPL-MCNC: 8.9 MG/DL (ref 8.6–10)
CHLORIDE SERPL-SCNC: 105 MMOL/L (ref 98–107)
CREAT SERPL-MCNC: 1.13 MG/DL (ref 0.67–1.17)
DACRYOCYTES BLD QL SMEAR: ABNORMAL
DEPRECATED HCO3 PLAS-SCNC: 22 MMOL/L (ref 22–29)
EGFRCR SERPLBLD CKD-EPI 2021: 77 ML/MIN/1.73M2
ELLIPTOCYTES BLD QL SMEAR: SLIGHT
EOSINOPHIL # BLD AUTO: 0.1 10E3/UL (ref 0–0.7)
EOSINOPHIL NFR BLD AUTO: 2 %
ERYTHROCYTE [DISTWIDTH] IN BLOOD BY AUTOMATED COUNT: 16.1 % (ref 10–15)
FRAGMENTS BLD QL SMEAR: ABNORMAL
GLUCOSE SERPL-MCNC: 109 MG/DL (ref 70–99)
HCT VFR BLD AUTO: 35.6 % (ref 40–53)
HGB BLD-MCNC: 11.6 G/DL (ref 13.3–17.7)
HGB C CRYSTALS: ABNORMAL
HOWELL-JOLLY BOD BLD QL SMEAR: ABNORMAL
IMM GRANULOCYTES # BLD: 0 10E3/UL
IMM GRANULOCYTES NFR BLD: 0 %
LYMPHOCYTES # BLD AUTO: 1.3 10E3/UL (ref 0.8–5.3)
LYMPHOCYTES NFR BLD AUTO: 29 %
MCH RBC QN AUTO: 28.4 PG (ref 26.5–33)
MCHC RBC AUTO-ENTMCNC: 32.6 G/DL (ref 31.5–36.5)
MCV RBC AUTO: 87 FL (ref 78–100)
MONOCYTES # BLD AUTO: 0.9 10E3/UL (ref 0–1.3)
MONOCYTES NFR BLD AUTO: 21 %
NEUTROPHILS # BLD AUTO: 2.1 10E3/UL (ref 1.6–8.3)
NEUTROPHILS NFR BLD AUTO: 47 %
NEUTS HYPERSEG BLD QL SMEAR: ABNORMAL
NRBC # BLD AUTO: 0 10E3/UL
NRBC BLD AUTO-RTO: 0 /100
PLAT MORPH BLD: ABNORMAL
PLATELET # BLD AUTO: 370 10E3/UL (ref 150–450)
POLYCHROMASIA BLD QL SMEAR: ABNORMAL
POTASSIUM SERPL-SCNC: 4.4 MMOL/L (ref 3.4–5.3)
PROT SERPL-MCNC: 6.3 G/DL (ref 6.4–8.3)
RBC # BLD AUTO: 4.08 10E6/UL (ref 4.4–5.9)
RBC AGGLUT BLD QL: ABNORMAL
RBC MORPH BLD: ABNORMAL
ROULEAUX BLD QL SMEAR: ABNORMAL
SICKLE CELLS BLD QL SMEAR: ABNORMAL
SMUDGE CELLS BLD QL SMEAR: ABNORMAL
SODIUM SERPL-SCNC: 138 MMOL/L (ref 135–145)
SPHEROCYTES BLD QL SMEAR: ABNORMAL
STOMATOCYTES BLD QL SMEAR: ABNORMAL
TARGETS BLD QL SMEAR: ABNORMAL
TOTAL PROTEIN SERUM FOR ELP: 6 G/DL (ref 6.4–8.3)
TOXIC GRANULES BLD QL SMEAR: ABNORMAL
VARIANT LYMPHS BLD QL SMEAR: ABNORMAL
WBC # BLD AUTO: 4.3 10E3/UL (ref 4–11)

## 2024-03-29 PROCEDURE — 36415 COLL VENOUS BLD VENIPUNCTURE: CPT

## 2024-03-29 PROCEDURE — 84165 PROTEIN E-PHORESIS SERUM: CPT | Mod: 26 | Performed by: STUDENT IN AN ORGANIZED HEALTH CARE EDUCATION/TRAINING PROGRAM

## 2024-03-29 PROCEDURE — 85041 AUTOMATED RBC COUNT: CPT

## 2024-03-29 PROCEDURE — 86334 IMMUNOFIX E-PHORESIS SERUM: CPT | Mod: 26 | Performed by: STUDENT IN AN ORGANIZED HEALTH CARE EDUCATION/TRAINING PROGRAM

## 2024-03-29 PROCEDURE — 250N000011 HC RX IP 250 OP 636: Mod: JZ | Performed by: REGISTERED NURSE

## 2024-03-29 PROCEDURE — 84165 PROTEIN E-PHORESIS SERUM: CPT | Mod: TC | Performed by: STUDENT IN AN ORGANIZED HEALTH CARE EDUCATION/TRAINING PROGRAM

## 2024-03-29 PROCEDURE — 99214 OFFICE O/P EST MOD 30 MIN: CPT | Performed by: REGISTERED NURSE

## 2024-03-29 PROCEDURE — 83521 IG LIGHT CHAINS FREE EACH: CPT

## 2024-03-29 PROCEDURE — G2211 COMPLEX E/M VISIT ADD ON: HCPCS | Performed by: REGISTERED NURSE

## 2024-03-29 PROCEDURE — 99213 OFFICE O/P EST LOW 20 MIN: CPT | Mod: 25 | Performed by: REGISTERED NURSE

## 2024-03-29 PROCEDURE — 82784 ASSAY IGA/IGD/IGG/IGM EACH: CPT

## 2024-03-29 PROCEDURE — 86334 IMMUNOFIX E-PHORESIS SERUM: CPT | Performed by: STUDENT IN AN ORGANIZED HEALTH CARE EDUCATION/TRAINING PROGRAM

## 2024-03-29 PROCEDURE — 80053 COMPREHEN METABOLIC PANEL: CPT

## 2024-03-29 PROCEDURE — 84155 ASSAY OF PROTEIN SERUM: CPT | Mod: 91

## 2024-03-29 PROCEDURE — 96401 CHEMO ANTI-NEOPL SQ/IM: CPT

## 2024-03-29 RX ORDER — DIPHENHYDRAMINE HCL 25 MG
50 CAPSULE ORAL ONCE
Status: CANCELLED
Start: 2024-04-05 | End: 2024-04-05

## 2024-03-29 RX ORDER — HEPARIN SODIUM (PORCINE) LOCK FLUSH IV SOLN 100 UNIT/ML 100 UNIT/ML
5 SOLUTION INTRAVENOUS
Status: CANCELLED | OUTPATIENT
Start: 2024-04-05

## 2024-03-29 RX ORDER — NALOXONE HYDROCHLORIDE 0.4 MG/ML
0.2 INJECTION, SOLUTION INTRAMUSCULAR; INTRAVENOUS; SUBCUTANEOUS
Status: CANCELLED | OUTPATIENT
Start: 2024-04-05

## 2024-03-29 RX ORDER — DEXAMETHASONE 4 MG/1
12 TABLET ORAL ONCE
Status: CANCELLED
Start: 2024-04-05 | End: 2024-04-05

## 2024-03-29 RX ORDER — HEPARIN SODIUM,PORCINE 10 UNIT/ML
5 VIAL (ML) INTRAVENOUS
Status: CANCELLED | OUTPATIENT
Start: 2024-04-05

## 2024-03-29 RX ORDER — METHYLPREDNISOLONE SODIUM SUCCINATE 125 MG/2ML
125 INJECTION, POWDER, LYOPHILIZED, FOR SOLUTION INTRAMUSCULAR; INTRAVENOUS
Status: CANCELLED
Start: 2024-04-05

## 2024-03-29 RX ORDER — LENALIDOMIDE 10 MG/1
10 CAPSULE ORAL DAILY
Qty: 21 CAPSULE | Refills: 0 | Status: SHIPPED | OUTPATIENT
Start: 2024-03-29 | End: 2024-05-03

## 2024-03-29 RX ORDER — EPINEPHRINE 1 MG/ML
0.3 INJECTION, SOLUTION INTRAMUSCULAR; SUBCUTANEOUS EVERY 5 MIN PRN
Status: CANCELLED | OUTPATIENT
Start: 2024-04-05

## 2024-03-29 RX ORDER — ALBUTEROL SULFATE 0.83 MG/ML
2.5 SOLUTION RESPIRATORY (INHALATION)
Status: CANCELLED | OUTPATIENT
Start: 2024-04-05

## 2024-03-29 RX ORDER — MEPERIDINE HYDROCHLORIDE 25 MG/ML
25 INJECTION INTRAMUSCULAR; INTRAVENOUS; SUBCUTANEOUS EVERY 30 MIN PRN
Status: CANCELLED | OUTPATIENT
Start: 2024-04-05

## 2024-03-29 RX ORDER — DIPHENHYDRAMINE HYDROCHLORIDE 50 MG/ML
50 INJECTION INTRAMUSCULAR; INTRAVENOUS
Status: CANCELLED
Start: 2024-04-05

## 2024-03-29 RX ORDER — ACETAMINOPHEN 325 MG/1
650 TABLET ORAL ONCE
Status: CANCELLED
Start: 2024-04-05 | End: 2024-04-05

## 2024-03-29 RX ORDER — ALBUTEROL SULFATE 90 UG/1
1-2 AEROSOL, METERED RESPIRATORY (INHALATION)
Status: CANCELLED
Start: 2024-04-05

## 2024-03-29 RX ADMIN — DARATUMUMAB AND HYALURONIDASE-FIHJ (HUMAN RECOMBINANT) 1800 MG: 1800; 30000 INJECTION SUBCUTANEOUS at 15:26

## 2024-03-29 ASSESSMENT — PAIN SCALES - GENERAL: PAINLEVEL: MODERATE PAIN (5)

## 2024-03-29 NOTE — LETTER
3/29/2024         RE: June Ronquillo  [address]  [facility] [address]        Dear Colleague,    Thank you for referring your patient, June Ronquillo, to the [facility]. Please see a copy of my visit note below.    [facility]  Date of visit: 03/29/2024        Oncologic History  - 4/30/2021 M spike of 34.8 in urine.M spike in blood 0.2; IgG 702 with depressed IgA and IgM. Beta 2 microglobulin 2.7. Lambda  with K/L ratio of 0.01. WBC 11.1 with absolute eos of 1.0. hgb, plt, Cr (1.1), and albumin WNL.    -4/30/2021 CT abd/pelvis showed sclerotic marrow changes with numerous lytic appearing lesions throughout the imaged portions of the spine, pelvis and ribs.      -PET scan 5/11/2021 Numerous osteolytic lesions which predominantly demonstrate uptake below liver background levels. There is one intraosseous lesion in the left lateral 9th rib that demonstrates uptake above background, possibly due to nondisplaced fracture. Adjacent to this lesion is mild hypermetabolism to the left pleura, with new small left pleural effusion, suspicious for malignant effusion versus posttraumatic effusion. Pleural uptake may represent extramedullary myeloma extending along the intercostal space versus inflammation.    - BM bx 5/17/2021 with flow showing 4.0% plasma cells which express CD19 (dim), CD38 (bright), CD45 (dim), , and monotypic cytoplasmic lambda immunoglobulin light chains but lack CD20 and CD56.  Hypercellular bone marrow for age (approximately 75% cellularity) with adequate trilineage hematopoiesis. Plasma cell infiltrate: Interstitial, lambda monotypic and representing approximately 60% the bone marrow cellularity, by  immunohistochemical stain. Cytpgenetics with 2 related hypodiploid clones. One with loss of Y, monosomy 13 and 14 (5%) and one with translocation of 8p and 14, derivative 16 with long arm replaced by  "extra copy 1q,monosomy 21. FISH showed gain of 1q, loss of 13 and 14, IGH-MYC fusion t(8:14)    - Started Miracle-RVd 21 day with velcade on days 1,8,15.     -Cycle 2 Miracle-RVd. Dose reduce dex to 80 mg d/t fatigue and stomach upset on dex days. Also having cough with basilar streaking on CXR    - 8/31/2021 BM bx -rare suspicious plasma cells in touch imprint. No increase in plasma cells by morph.    -8/31/2021 PET/CT Diffuse osteolytic lesions throughout the axial and appendicular skeleton. Increased sclerosis since prior exam 5/11/2021 without increased metabolism or size.  Hypermetabolic pretracheal lymph node as well as hypermetabolic level 2 lymph nodes within the right neck. These lymph nodes are likely reactive from autoimmune activation via medical therapy. Hypermetabolic subcentimeter nodules within the thyroid gland    - 11/11/2021 Autologous BMT    - 2/21/22 started maintenance with Revlimid 10 mg and daratumumab monthly; did Revlimid alone for C1 due to low IgG levels, added daratumumab starting C2.  Initially did Revlimid daily, decreased to 21/28 days in Nov 2023 to help minimize side effects    Interval Hx:   June presents for toxicity evaluation prior to receiving miracle+Revlimid maintenance  today.  Continues to have left elbow pain, present for about 1 month now.  Thinks it may have started after shoveling, ran into something and \"jammed\" his elbow  Pain triggered by holding his phone with his elbow flexed  Denies any other new symptoms or concerns.  He is eating and drinking without issue and good appetite.      Denies fevers/chills, SOB/cough, chest pain, bleeding issues,  N/V,  constipation, rashes/sores, night sweats, new pains, new lumps/bumps. edema, mouth sores.    Physical Exam:    /83   Pulse 78   Temp 98.3  F (36.8  C) (Oral)   Resp 16   Wt 70.8 kg (156 lb 1.6 oz)   SpO2 100%   BMI 26.38 kg/m      Wt Readings from Last 5 Encounters:   03/29/24 70.8 kg (156 lb 1.6 oz) " "  03/01/24 70.3 kg (154 lb 14.4 oz)   02/28/24 69.4 kg (153 lb)   02/13/24 68.9 kg (152 lb)   02/08/24 68.7 kg (151 lb 7.3 oz)       Constitutional: Appears stated age, well-groomed.  Accompanied by self. In no apparent distress.   HEENT: Normocephilic. EOMI, PERRL. Sclerae are anicteric.   Respiratory: No increased work of breathing, good air exchange, clear to auscultation bilaterally, no crackles or wheezing.  Cardiovascular: Normal apical impulse, regular rate and rhythm, normal S1 and S2, and no murmur noted.  GI: +BS. Soft. No tenderness on palpation.  Skin: No bruising or bleeding, normal skin color, texture, turgor, no redness, warmth, or swelling, no rashes, no lesions, no jaundice.  Extremities: Mild tenderness to palpation in left upper extremity starting on medial side of mid-forearm, extending up the arm to a few inches proximal of the elbow; No bilateral lower extremity edema.  Neurologic: Awake, alert, oriented to name, place and time.    Vascular access: None      Allergies   Allergen Reactions    Blood Transfusion Related (Informational Only) Other (See Comments)     Patient has a history of a clinically significant antibody against RBC antigens.  A delay in compatible RBCs may occur.      Current Outpatient Medications   Medication    acyclovir (ZOVIRAX) 400 MG tablet    albuterol (PROAIR HFA/PROVENTIL HFA/VENTOLIN HFA) 108 (90 Base) MCG/ACT inhaler    aspirin (ASA) 81 MG EC tablet    calcium carbonate-vitamin D (OSCAL) 500-5 MG-MCG tablet    LENalidomide (REVLIMID) 10 MG CAPS capsule    LENalidomide (REVLIMID) 10 MG CAPS capsule    levothyroxine (SYNTHROID/LEVOTHROID) 125 MCG tablet    Needle, Disp, (B-D BLUNT FILL NEEDLE) 18G X 1-1/2\" MISC    Needle, Disp, (BD DISP NEEDLE) 23G X 1\" MISC    potassium chloride ER (KLOR-CON M) 10 MEQ CR tablet    rosuvastatin (CRESTOR) 40 MG tablet    syringe, disposable, 1 ML MISC    testosterone cypionate (DEPOTESTOSTERONE) 200 MG/ML injection    vitamin B complex " with vitamin C (STRESS TAB) tablet     No current facility-administered medications for this visit.     Most Recent 3 CBC's:  Recent Labs   Lab Test 03/29/24  1405 03/01/24  1416 02/02/24  1317   WBC 4.3 4.7 6.2   HGB 11.6* 12.0* 12.0*   MCV 87 89 89    392 334   ANEUTAUTO 2.1 2.7 3.5     Most Recent 3 BMP's:  Recent Labs   Lab Test 03/29/24  1405 03/01/24  1416 02/02/24  1317    139 137   POTASSIUM 4.4 3.5 4.0   CHLORIDE 105 106 105   CO2 22 22 23   BUN 12.5 11.7 13.1   CR 1.13 1.19* 1.16   ANIONGAP 11 11 9   HARDEEP 8.9 8.6 9.2   * 139* 115*   PROTTOTAL 6.3* 6.2* 6.4   ALBUMIN 4.2 4.3 4.4    Most Recent 3 LFT's:  Recent Labs   Lab Test 03/29/24  1405 03/01/24  1416 02/02/24  1317   AST 26 23 24   ALT 16 11 15   ALKPHOS 58 57 59   BILITOTAL 0.3 0.3 0.4    Most Recent 2 TSH and T4:  Recent Labs   Lab Test 01/08/24  0713 06/26/23  1442 12/19/22  1103 06/17/22  1332 11/14/21  1103 10/20/21  1145   TSH 0.86 0.58   < > 0.07*   < > 0.70   T4  --   --   --  1.36  --  0.97    < > = values in this interval not displayed.     I reviewed the above labs today.    Assessment and Plan  Multiple myeloma with high risk cytogenetics  - Miracle-RVD with VGPR, then underwent autologous transplant 11/11/21.   - Given his high risk cytogenetics he started miracle + revlimid maintenance therapy 2/21/22.  - Completed 2 years of zometa in Sept 2023. Continue Vit D.   - Revlimid was decreased to 21/28 days in Nov 2023 to try to minimize side effects (dysgeusia, flu-like symptoms on days following daratumumab)  - Continue miracle + revlimid  - Continue acyclovir daily for viral ppx   - Received influenza vaccine 12/15/23  - COVID + 10/19/23.  Received COVID booster 02/02/24   - Pneumococcal vaccine given 02/02/24   - Got first Shingrix vaccine 3/4/24    Hypogammaglobulinemia   Start monthly IV Ig if IgG <300  - 11/16/23 IgG 438  - 2/2/24 IgG 465    Normocytic Anemia:   - Likely d/t revlimid and daratumumab.   - Will keep monitoring  and dose reduce if needed.      Elevated BP:    -Previously discussed his elevated BP in clinic and recommended following with his PCP.    - He has been taking his BP at home and they are within a normal range.    Left elbow pain  History and exam most consistent with a tendonitis or bursitis in the elbow. Discussed x-ray today and Logan agreed, but left before it was able to be scheduled (I think he probably forgot about our discussion after he went to infusion).  - Recommend ice for 20 minutes three times daily and Voltaren gel for pain relief/anti-inflammatory. If not improved in 7-14 days, he will let us know and we will pursue imaging (would get x-ray first, if negative could get MRI vs ortho consult for further evaluation)      30 minutes spent on the date of the encounter doing chart review, review of test results, interpretation of tests, patient visit, and documentation     The longitudinal plan of care for the diagnosis(es)/condition(s) as documented were addressed during this visit. Due to the added complexity in care, I will continue to support June in the subsequent management and with ongoing continuity of care.    PATTY Meza [facility]  [address] [place] [address] [phone]

## 2024-03-29 NOTE — PROGRESS NOTES
Infusion Nursing Note:  June Ronquillo presents today for C29D1 Darzalex Faspro.    Patient seen by provider today: Yes: Laura Ballesteros NP   present during visit today: Not Applicable.    Note: N/A.      Intravenous Access:  No Intravenous access/labs at this visit.    Treatment Conditions:  Lab Results   Component Value Date    HGB 11.6 (L) 03/29/2024    WBC 4.3 03/29/2024    ANEU 4.7 03/13/2023    ANEUTAUTO 2.1 03/29/2024     03/29/2024        Lab Results   Component Value Date     03/29/2024    POTASSIUM 4.4 03/29/2024    MAG 2.6 (H) 04/28/2023    CR 1.13 03/29/2024    HARDEEP 8.9 03/29/2024    BILITOTAL 0.3 03/29/2024    ALBUMIN 4.2 03/29/2024    ALT 16 03/29/2024    AST 26 03/29/2024       Results reviewed, labs MET treatment parameters, ok to proceed with treatment.      Post Infusion Assessment:  Patient tolerated one Darzalex Faspro injection on left lower abdomen without incident.  Site patent and intact, free from redness, edema or discomfort.  No evidence of extravasations.       Discharge Plan:   Patient declined prescription refills. Has enough supply of Revlimid.   Discharge instructions reviewed with: Patient.  Patient and/or family verbalized understanding of discharge instructions and all questions answered.  AVS to patient via [employer].  Patient will return 4/26/24 for next appointment.   Patient discharged in stable condition accompanied by: self.  Departure Mode: Ambulatory.      WILIAM PELAYO RN

## 2024-03-29 NOTE — NURSING NOTE
Chief Complaint   Patient presents with    Blood Draw     Vpt blood draw by lab RN. Vitals taken and appointment arrived.     Bibi Breaux RN

## 2024-03-29 NOTE — NURSING NOTE
"Oncology Rooming Note    March 29, 2024 2:21 PM   June Ronquillo is a 53 year old male who presents for:    Chief Complaint   Patient presents with    Blood Draw     Vpt blood draw by lab RN. Vitals taken and appointment arrived.    Oncology Clinic Visit     MM     Initial Vitals: /83   Pulse 78   Temp 98.3  F (36.8  C) (Oral)   Resp 16   Wt 70.8 kg (156 lb 1.6 oz)   SpO2 100%   BMI 26.38 kg/m   Estimated body mass index is 26.38 kg/m  as calculated from the following:    Height as of 2/28/24: 1.638 m (5' 4.5\").    Weight as of this encounter: 70.8 kg (156 lb 1.6 oz). Body surface area is 1.79 meters squared.  Moderate Pain (5) Comment: Left elbow   No LMP for male patient.  Allergies reviewed: Yes  Medications reviewed: Yes    Medications: Medication refills not needed today.  Pharmacy name entered into [employer]:    [facility] [place], [place] - [phone]  [facility] - [place], [place] - [address] [facility] [place], [place] [address] [facility] [phone]  [facility] [place], [place] - [address] [facility]  [facility] - [place], [place] - [address]    Frailty Screening:   Is the patient here for a new oncology consult visit in cancer care? 2. No      Clinical concerns:        Ivana Espinoza              "

## 2024-03-29 NOTE — PROGRESS NOTES
[facility]  Date of visit: 03/29/2024        Oncologic History  - 4/30/2021 M spike of 34.8 in urine.M spike in blood 0.2; IgG 702 with depressed IgA and IgM. Beta 2 microglobulin 2.7. Lambda  with K/L ratio of 0.01. WBC 11.1 with absolute eos of 1.0. hgb, plt, Cr (1.1), and albumin WNL.    -4/30/2021 CT abd/pelvis showed sclerotic marrow changes with numerous lytic appearing lesions throughout the imaged portions of the spine, pelvis and ribs.      -PET scan 5/11/2021 Numerous osteolytic lesions which predominantly demonstrate uptake below liver background levels. There is one intraosseous lesion in the left lateral 9th rib that demonstrates uptake above background, possibly due to nondisplaced fracture. Adjacent to this lesion is mild hypermetabolism to the left pleura, with new small left pleural effusion, suspicious for malignant effusion versus posttraumatic effusion. Pleural uptake may represent extramedullary myeloma extending along the intercostal space versus inflammation.    - BM bx 5/17/2021 with flow showing 4.0% plasma cells which express CD19 (dim), CD38 (bright), CD45 (dim), , and monotypic cytoplasmic lambda immunoglobulin light chains but lack CD20 and CD56.  Hypercellular bone marrow for age (approximately 75% cellularity) with adequate trilineage hematopoiesis. Plasma cell infiltrate: Interstitial, lambda monotypic and representing approximately 60% the bone marrow cellularity, by  immunohistochemical stain. Cytpgenetics with 2 related hypodiploid clones. One with loss of Y, monosomy 13 and 14 (5%) and one with translocation of 8p and 14, derivative 16 with long arm replaced by extra copy 1q,monosomy 21. FISH showed gain of 1q, loss of 13 and 14, IGH-MYC fusion t(8:14)    - Started Miracle-RVd 21 day with velcade on days 1,8,15.     -Cycle 2 Miracle-RVd. Dose reduce dex to 80 mg d/t fatigue and stomach upset on dex days. Also having cough with basilar  "streaking on CXR    - 8/31/2021 BM bx -rare suspicious plasma cells in touch imprint. No increase in plasma cells by morph.    -8/31/2021 PET/CT Diffuse osteolytic lesions throughout the axial and appendicular skeleton. Increased sclerosis since prior exam 5/11/2021 without increased metabolism or size.  Hypermetabolic pretracheal lymph node as well as hypermetabolic level 2 lymph nodes within the right neck. These lymph nodes are likely reactive from autoimmune activation via medical therapy. Hypermetabolic subcentimeter nodules within the thyroid gland    - 11/11/2021 Autologous BMT    - 2/21/22 started maintenance with Revlimid 10 mg and daratumumab monthly; did Revlimid alone for C1 due to low IgG levels, added daratumumab starting C2.  Initially did Revlimid daily, decreased to 21/28 days in Nov 2023 to help minimize side effects    Interval Hx:   June presents for toxicity evaluation prior to receiving vidya+Revlimid maintenance  today.  Continues to have left elbow pain, present for about 1 month now.  Thinks it may have started after shoveling, ran into something and \"jammed\" his elbow  Pain triggered by holding his phone with his elbow flexed  Denies any other new symptoms or concerns.  He is eating and drinking without issue and good appetite.      Denies fevers/chills, SOB/cough, chest pain, bleeding issues,  N/V,  constipation, rashes/sores, night sweats, new pains, new lumps/bumps. edema, mouth sores.    Physical Exam:    /83   Pulse 78   Temp 98.3  F (36.8  C) (Oral)   Resp 16   Wt 70.8 kg (156 lb 1.6 oz)   SpO2 100%   BMI 26.38 kg/m      Wt Readings from Last 5 Encounters:   03/29/24 70.8 kg (156 lb 1.6 oz)   03/01/24 70.3 kg (154 lb 14.4 oz)   02/28/24 69.4 kg (153 lb)   02/13/24 68.9 kg (152 lb)   02/08/24 68.7 kg (151 lb 7.3 oz)       Constitutional: Appears stated age, well-groomed.  Accompanied by self. In no apparent distress.   HEENT: Normocephilic. EOMI, PERRL. Sclerae are " "anicteric.   Respiratory: No increased work of breathing, good air exchange, clear to auscultation bilaterally, no crackles or wheezing.  Cardiovascular: Normal apical impulse, regular rate and rhythm, normal S1 and S2, and no murmur noted.  GI: +BS. Soft. No tenderness on palpation.  Skin: No bruising or bleeding, normal skin color, texture, turgor, no redness, warmth, or swelling, no rashes, no lesions, no jaundice.  Extremities: Mild tenderness to palpation in left upper extremity starting on medial side of mid-forearm, extending up the arm to a few inches proximal of the elbow; No bilateral lower extremity edema.  Neurologic: Awake, alert, oriented to name, place and time.    Vascular access: None      Allergies   Allergen Reactions    Blood Transfusion Related (Informational Only) Other (See Comments)     Patient has a history of a clinically significant antibody against RBC antigens.  A delay in compatible RBCs may occur.      Current Outpatient Medications   Medication    acyclovir (ZOVIRAX) 400 MG tablet    albuterol (PROAIR HFA/PROVENTIL HFA/VENTOLIN HFA) 108 (90 Base) MCG/ACT inhaler    aspirin (ASA) 81 MG EC tablet    calcium carbonate-vitamin D (OSCAL) 500-5 MG-MCG tablet    LENalidomide (REVLIMID) 10 MG CAPS capsule    LENalidomide (REVLIMID) 10 MG CAPS capsule    levothyroxine (SYNTHROID/LEVOTHROID) 125 MCG tablet    Needle, Disp, (B-D BLUNT FILL NEEDLE) 18G X 1-1/2\" MISC    Needle, Disp, (BD DISP NEEDLE) 23G X 1\" MISC    potassium chloride ER (KLOR-CON M) 10 MEQ CR tablet    rosuvastatin (CRESTOR) 40 MG tablet    syringe, disposable, 1 ML MISC    testosterone cypionate (DEPOTESTOSTERONE) 200 MG/ML injection    vitamin B complex with vitamin C (STRESS TAB) tablet     No current facility-administered medications for this visit.     Most Recent 3 CBC's:  Recent Labs   Lab Test 03/29/24  1405 03/01/24  1416 02/02/24  1317   WBC 4.3 4.7 6.2   HGB 11.6* 12.0* 12.0*   MCV 87 89 89    392 334 "   ANEUTAUTO 2.1 2.7 3.5     Most Recent 3 BMP's:  Recent Labs   Lab Test 03/29/24  1405 03/01/24  1416 02/02/24  1317    139 137   POTASSIUM 4.4 3.5 4.0   CHLORIDE 105 106 105   CO2 22 22 23   BUN 12.5 11.7 13.1   CR 1.13 1.19* 1.16   ANIONGAP 11 11 9   HARDEEP 8.9 8.6 9.2   * 139* 115*   PROTTOTAL 6.3* 6.2* 6.4   ALBUMIN 4.2 4.3 4.4    Most Recent 3 LFT's:  Recent Labs   Lab Test 03/29/24  1405 03/01/24  1416 02/02/24  1317   AST 26 23 24   ALT 16 11 15   ALKPHOS 58 57 59   BILITOTAL 0.3 0.3 0.4    Most Recent 2 TSH and T4:  Recent Labs   Lab Test 01/08/24  0713 06/26/23  1442 12/19/22  1103 06/17/22  1332 11/14/21  1103 10/20/21  1145   TSH 0.86 0.58   < > 0.07*   < > 0.70   T4  --   --   --  1.36  --  0.97    < > = values in this interval not displayed.     I reviewed the above labs today.    Assessment and Plan  Multiple myeloma with high risk cytogenetics  - Miracle-RVD with VGPR, then underwent autologous transplant 11/11/21.   - Given his high risk cytogenetics he started miracle + revlimid maintenance therapy 2/21/22.  - Completed 2 years of zometa in Sept 2023. Continue Vit D.   - Revlimid was decreased to 21/28 days in Nov 2023 to try to minimize side effects (dysgeusia, flu-like symptoms on days following daratumumab)  - Continue miracle + revlimid  - Continue acyclovir daily for viral ppx   - Received influenza vaccine 12/15/23  - COVID + 10/19/23.  Received COVID booster 02/02/24   - Pneumococcal vaccine given 02/02/24   - Got first Shingrix vaccine 3/4/24    Hypogammaglobulinemia   Start monthly IV Ig if IgG <300  - 11/16/23 IgG 438  - 2/2/24 IgG 465    Normocytic Anemia:   - Likely d/t revlimid and daratumumab.   - Will keep monitoring and dose reduce if needed.      Elevated BP:    -Previously discussed his elevated BP in clinic and recommended following with his PCP.    - He has been taking his BP at home and they are within a normal range.    Left elbow pain  History and exam most consistent with a  tendonitis or bursitis in the elbow. Discussed x-ray today and Logan agreed, but left before it was able to be scheduled (I think he probably forgot about our discussion after he went to infusion).  - Recommend ice for 20 minutes three times daily and Voltaren gel for pain relief/anti-inflammatory. If not improved in 7-14 days, he will let us know and we will pursue imaging (would get x-ray first, if negative could get MRI vs ortho consult for further evaluation)      30 minutes spent on the date of the encounter doing chart review, review of test results, interpretation of tests, patient visit, and documentation     The longitudinal plan of care for the diagnosis(es)/condition(s) as documented were addressed during this visit. Due to the added complexity in care, I will continue to support June in the subsequent management and with ongoing continuity of care.    PATTY Meza [facility] [address] [id number] [phone]

## 2024-04-01 LAB
ALBUMIN SERPL ELPH-MCNC: 4 G/DL (ref 3.7–5.1)
ALPHA1 GLOB SERPL ELPH-MCNC: 0.3 G/DL (ref 0.2–0.4)
ALPHA2 GLOB SERPL ELPH-MCNC: 0.5 G/DL (ref 0.5–0.9)
B-GLOBULIN SERPL ELPH-MCNC: 0.7 G/DL (ref 0.6–1)
GAMMA GLOB SERPL ELPH-MCNC: 0.4 G/DL (ref 0.7–1.6)
IGA SERPL-MCNC: 54 MG/DL (ref 84–499)
IGG SERPL-MCNC: 454 MG/DL (ref 610–1616)
IGM SERPL-MCNC: 21 MG/DL (ref 35–242)
KAPPA LC FREE SER-MCNC: 0.83 MG/DL (ref 0.33–1.94)
KAPPA LC FREE/LAMBDA FREE SER NEPH: 1.26 {RATIO} (ref 0.26–1.65)
LAMBDA LC FREE SERPL-MCNC: 0.66 MG/DL (ref 0.57–2.63)
M PROTEIN SERPL ELPH-MCNC: 0.1 G/DL
PROT PATTERN SERPL ELPH-IMP: ABNORMAL
PROT PATTERN SERPL IFE-IMP: NORMAL

## 2024-04-11 DIAGNOSIS — C90.00 MULTIPLE MYELOMA NOT HAVING ACHIEVED REMISSION (H): ICD-10-CM

## 2024-04-12 ENCOUNTER — TELEPHONE (OUTPATIENT)
Dept: ONCOLOGY | Facility: CLINIC | Age: 54
End: 2024-04-12
Payer: COMMERCIAL

## 2024-04-12 RX ORDER — LENALIDOMIDE 10 MG/1
CAPSULE ORAL
Refills: 0 | OUTPATIENT
Start: 2024-04-12

## 2024-04-12 NOTE — TELEPHONE ENCOUNTER
Oral Chemotherapy Monitoring Program   Medication: Revlimid  Rx: 10mg PO daily on days 1 through 21 of 28 day cycle   Auth #: [id number]   Risk Category: Adult Male  Routine survey questions reviewed.   Rx to be Escribed to [facility]    Yu Goldberg  [facility]  Oncology Pharmacy Liaison   [email or web address]  [phone] (phone)  [phone] (fax)

## 2024-04-26 ENCOUNTER — ONCOLOGY VISIT (OUTPATIENT)
Dept: ONCOLOGY | Facility: CLINIC | Age: 54
End: 2024-04-26
Attending: STUDENT IN AN ORGANIZED HEALTH CARE EDUCATION/TRAINING PROGRAM
Payer: COMMERCIAL

## 2024-04-26 ENCOUNTER — APPOINTMENT (OUTPATIENT)
Dept: LAB | Facility: CLINIC | Age: 54
End: 2024-04-26
Attending: STUDENT IN AN ORGANIZED HEALTH CARE EDUCATION/TRAINING PROGRAM
Payer: COMMERCIAL

## 2024-04-26 ENCOUNTER — LAB (OUTPATIENT)
Dept: LAB | Facility: CLINIC | Age: 54
End: 2024-04-26
Payer: COMMERCIAL

## 2024-04-26 VITALS
DIASTOLIC BLOOD PRESSURE: 80 MMHG | BODY MASS INDEX: 25.72 KG/M2 | SYSTOLIC BLOOD PRESSURE: 126 MMHG | WEIGHT: 152.2 LBS | HEART RATE: 77 BPM | TEMPERATURE: 98 F | OXYGEN SATURATION: 100 % | RESPIRATION RATE: 16 BRPM

## 2024-04-26 DIAGNOSIS — R19.7 DIARRHEA, UNSPECIFIED TYPE: ICD-10-CM

## 2024-04-26 DIAGNOSIS — C90.01 MULTIPLE MYELOMA IN REMISSION (H): Primary | ICD-10-CM

## 2024-04-26 DIAGNOSIS — C90.00 MULTIPLE MYELOMA NOT HAVING ACHIEVED REMISSION (H): Primary | ICD-10-CM

## 2024-04-26 LAB
ALBUMIN SERPL BCG-MCNC: 4.4 G/DL (ref 3.5–5.2)
ALP SERPL-CCNC: 60 U/L (ref 40–150)
ALT SERPL W P-5'-P-CCNC: 15 U/L (ref 0–70)
ANION GAP SERPL CALCULATED.3IONS-SCNC: 12 MMOL/L (ref 7–15)
AST SERPL W P-5'-P-CCNC: 32 U/L (ref 0–45)
BASOPHILS # BLD AUTO: 0 10E3/UL (ref 0–0.2)
BASOPHILS NFR BLD AUTO: 0 %
BILIRUB SERPL-MCNC: 0.3 MG/DL
BUN SERPL-MCNC: 18.5 MG/DL (ref 6–20)
CALCIUM SERPL-MCNC: 8 MG/DL (ref 8.6–10)
CHLORIDE SERPL-SCNC: 105 MMOL/L (ref 98–107)
CREAT SERPL-MCNC: 1.17 MG/DL (ref 0.67–1.17)
DEPRECATED HCO3 PLAS-SCNC: 19 MMOL/L (ref 22–29)
EGFRCR SERPLBLD CKD-EPI 2021: 74 ML/MIN/1.73M2
EOSINOPHIL # BLD AUTO: 0.1 10E3/UL (ref 0–0.7)
EOSINOPHIL NFR BLD AUTO: 2 %
ERYTHROCYTE [DISTWIDTH] IN BLOOD BY AUTOMATED COUNT: 17 % (ref 10–15)
GLUCOSE SERPL-MCNC: 93 MG/DL (ref 70–99)
HCT VFR BLD AUTO: 38.5 % (ref 40–53)
HGB BLD-MCNC: 12.6 G/DL (ref 13.3–17.7)
IMM GRANULOCYTES # BLD: 0 10E3/UL
IMM GRANULOCYTES NFR BLD: 0 %
LYMPHOCYTES # BLD AUTO: 1.8 10E3/UL (ref 0.8–5.3)
LYMPHOCYTES NFR BLD AUTO: 42 %
MCH RBC QN AUTO: 28.5 PG (ref 26.5–33)
MCHC RBC AUTO-ENTMCNC: 32.7 G/DL (ref 31.5–36.5)
MCV RBC AUTO: 87 FL (ref 78–100)
MONOCYTES # BLD AUTO: 0.8 10E3/UL (ref 0–1.3)
MONOCYTES NFR BLD AUTO: 20 %
NEUTROPHILS # BLD AUTO: 1.5 10E3/UL (ref 1.6–8.3)
NEUTROPHILS NFR BLD AUTO: 36 %
NRBC # BLD AUTO: 0 10E3/UL
NRBC BLD AUTO-RTO: 0 /100
PLATELET # BLD AUTO: 369 10E3/UL (ref 150–450)
POTASSIUM SERPL-SCNC: 3.7 MMOL/L (ref 3.4–5.3)
PROT SERPL-MCNC: 6.5 G/DL (ref 6.4–8.3)
RBC # BLD AUTO: 4.42 10E6/UL (ref 4.4–5.9)
SODIUM SERPL-SCNC: 136 MMOL/L (ref 135–145)
WBC # BLD AUTO: 4.2 10E3/UL (ref 4–11)

## 2024-04-26 PROCEDURE — 96401 CHEMO ANTI-NEOPL SQ/IM: CPT

## 2024-04-26 PROCEDURE — 82374 ASSAY BLOOD CARBON DIOXIDE: CPT

## 2024-04-26 PROCEDURE — 84155 ASSAY OF PROTEIN SERUM: CPT

## 2024-04-26 PROCEDURE — 84165 PROTEIN E-PHORESIS SERUM: CPT | Mod: 26 | Performed by: PATHOLOGY

## 2024-04-26 PROCEDURE — 82784 ASSAY IGA/IGD/IGG/IGM EACH: CPT

## 2024-04-26 PROCEDURE — 86334 IMMUNOFIX E-PHORESIS SERUM: CPT | Mod: 26 | Performed by: PATHOLOGY

## 2024-04-26 PROCEDURE — 250N000011 HC RX IP 250 OP 636: Mod: JZ

## 2024-04-26 PROCEDURE — 84165 PROTEIN E-PHORESIS SERUM: CPT | Mod: TC | Performed by: PATHOLOGY

## 2024-04-26 PROCEDURE — 83521 IG LIGHT CHAINS FREE EACH: CPT

## 2024-04-26 PROCEDURE — 258N000003 HC RX IP 258 OP 636

## 2024-04-26 PROCEDURE — 36415 COLL VENOUS BLD VENIPUNCTURE: CPT

## 2024-04-26 PROCEDURE — 96360 HYDRATION IV INFUSION INIT: CPT

## 2024-04-26 PROCEDURE — 86334 IMMUNOFIX E-PHORESIS SERUM: CPT | Performed by: PATHOLOGY

## 2024-04-26 PROCEDURE — 99214 OFFICE O/P EST MOD 30 MIN: CPT

## 2024-04-26 PROCEDURE — 84075 ASSAY ALKALINE PHOSPHATASE: CPT

## 2024-04-26 PROCEDURE — 99213 OFFICE O/P EST LOW 20 MIN: CPT

## 2024-04-26 PROCEDURE — 85025 COMPLETE CBC W/AUTO DIFF WBC: CPT

## 2024-04-26 RX ORDER — DIPHENHYDRAMINE HCL 25 MG
50 CAPSULE ORAL ONCE
Status: CANCELLED
Start: 2024-04-26 | End: 2024-05-03

## 2024-04-26 RX ORDER — HEPARIN SODIUM,PORCINE 10 UNIT/ML
5 VIAL (ML) INTRAVENOUS
Status: CANCELLED | OUTPATIENT
Start: 2024-04-26

## 2024-04-26 RX ORDER — DEXAMETHASONE 4 MG/1
12 TABLET ORAL ONCE
Status: CANCELLED
Start: 2024-04-26 | End: 2024-05-03

## 2024-04-26 RX ORDER — HEPARIN SODIUM (PORCINE) LOCK FLUSH IV SOLN 100 UNIT/ML 100 UNIT/ML
5 SOLUTION INTRAVENOUS
Status: CANCELLED | OUTPATIENT
Start: 2024-04-26

## 2024-04-26 RX ORDER — ALBUTEROL SULFATE 90 UG/1
1-2 AEROSOL, METERED RESPIRATORY (INHALATION)
Status: CANCELLED
Start: 2024-04-26

## 2024-04-26 RX ORDER — ACETAMINOPHEN 325 MG/1
650 TABLET ORAL ONCE
Status: CANCELLED
Start: 2024-04-26 | End: 2024-05-03

## 2024-04-26 RX ORDER — ALBUTEROL SULFATE 0.83 MG/ML
2.5 SOLUTION RESPIRATORY (INHALATION)
Status: CANCELLED | OUTPATIENT
Start: 2024-04-26

## 2024-04-26 RX ORDER — EPINEPHRINE 1 MG/ML
0.3 INJECTION, SOLUTION INTRAMUSCULAR; SUBCUTANEOUS EVERY 5 MIN PRN
Status: CANCELLED | OUTPATIENT
Start: 2024-04-26

## 2024-04-26 RX ORDER — DIPHENHYDRAMINE HYDROCHLORIDE 50 MG/ML
50 INJECTION INTRAMUSCULAR; INTRAVENOUS
Status: CANCELLED
Start: 2024-04-26

## 2024-04-26 RX ORDER — NALOXONE HYDROCHLORIDE 0.4 MG/ML
0.2 INJECTION, SOLUTION INTRAMUSCULAR; INTRAVENOUS; SUBCUTANEOUS
Status: CANCELLED | OUTPATIENT
Start: 2024-04-26

## 2024-04-26 RX ORDER — METHYLPREDNISOLONE SODIUM SUCCINATE 125 MG/2ML
125 INJECTION, POWDER, LYOPHILIZED, FOR SOLUTION INTRAMUSCULAR; INTRAVENOUS
Status: CANCELLED
Start: 2024-04-26

## 2024-04-26 RX ORDER — MEPERIDINE HYDROCHLORIDE 25 MG/ML
25 INJECTION INTRAMUSCULAR; INTRAVENOUS; SUBCUTANEOUS EVERY 30 MIN PRN
Status: CANCELLED | OUTPATIENT
Start: 2024-04-26

## 2024-04-26 RX ADMIN — SODIUM CHLORIDE 1000 ML: 9 INJECTION, SOLUTION INTRAVENOUS at 15:27

## 2024-04-26 RX ADMIN — DARATUMUMAB AND HYALURONIDASE-FIHJ (HUMAN RECOMBINANT) 1800 MG: 1800; 30000 INJECTION SUBCUTANEOUS at 16:04

## 2024-04-26 ASSESSMENT — PAIN SCALES - GENERAL: PAINLEVEL: NO PAIN (0)

## 2024-04-26 NOTE — NURSING NOTE
Chief Complaint   Patient presents with    Blood Draw     Labs drawn with .  VS taken.     Labs drawn with piv.  Pt tolerated well.  VS taken.  Pt checked in for next appt.    Aubrie Fleming RN

## 2024-04-26 NOTE — Clinical Note
4/26/2024         RE: June Ronquillo  [address]  [place] [address]        Dear Colleague,    Thank you for referring your patient, June Ronquillo, to the [facility]. Please see a copy of my visit note below.    [facility]  Date of visit: 04/26/2024        Oncologic History  - 4/30/2021 M spike of 34.8 in urine.M spike in blood 0.2; IgG 702 with depressed IgA and IgM. Beta 2 microglobulin 2.7. Lambda  with K/L ratio of 0.01. WBC 11.1 with absolute eos of 1.0. hgb, plt, Cr (1.1), and albumin WNL.    -4/30/2021 CT abd/pelvis showed sclerotic marrow changes with numerous lytic appearing lesions throughout the imaged portions of the spine, pelvis and ribs.      -PET scan 5/11/2021 Numerous osteolytic lesions which predominantly demonstrate uptake below liver background levels. There is one intraosseous lesion in the left lateral 9th rib that demonstrates uptake above background, possibly due to nondisplaced fracture. Adjacent to this lesion is mild hypermetabolism to the left pleura, with new small left pleural effusion, suspicious for malignant effusion versus posttraumatic effusion. Pleural uptake may represent extramedullary myeloma extending along the intercostal space versus inflammation.    - BM bx 5/17/2021 with flow showing 4.0% plasma cells which express CD19 (dim), CD38 (bright), CD45 (dim), , and monotypic cytoplasmic lambda immunoglobulin light chains but lack CD20 and CD56.  Hypercellular bone marrow for age (approximately 75% cellularity) with adequate trilineage hematopoiesis. Plasma cell infiltrate: Interstitial, lambda monotypic and representing approximately 60% the bone marrow cellularity, by  immunohistochemical stain. Cytpgenetics with 2 related hypodiploid clones. One with loss of Y, monosomy 13 and 14 (5%) and one with translocation of 8p and 14, derivative 16 with long arm replaced by  extra copy 1q,monosomy 21. FISH showed gain of 1q, loss of 13 and 14, IGH-MYC fusion t(8:14)    - Started Miracle-RVd 21 day with velcade on days 1,8,15.     -Cycle 2 Miracle-RVd. Dose reduce dex to 80 mg d/t fatigue and stomach upset on dex days. Also having cough with basilar streaking on CXR    - 8/31/2021 BM bx -rare suspicious plasma cells in touch imprint. No increase in plasma cells by morph.    -8/31/2021 PET/CT Diffuse osteolytic lesions throughout the axial and appendicular skeleton. Increased sclerosis since prior exam 5/11/2021 without increased metabolism or size.  Hypermetabolic pretracheal lymph node as well as hypermetabolic level 2 lymph nodes within the right neck. These lymph nodes are likely reactive from autoimmune activation via medical therapy. Hypermetabolic subcentimeter nodules within the thyroid gland    - 11/11/2021 Autologous BMT    - 2/21/22 started maintenance with Revlimid 10 mg and daratumumab monthly; did Revlimid alone for C1 due to low IgG levels, added daratumumab starting C2.  Initially did Revlimid daily, decreased to 21/28 days in Nov 2023 to help minimize side effects    Interval Hx:   - 3 episodes of diarrhea last night and 3 this AM.   - He took 2 ?pepto last night and today.   - no nausea or committing.  - he has fatigue today.   - no sweating  - he is pushing fluid, electrolytes. 1 gatorade, 1 glass of electrolyte water, and 1 can gingerale.  - granddaughter had a GI bug with diarrhea 1 month ago, son also had this ~1 month ago, wife is feeling little strange as well with diarrhea, and coworker 4-5 weeks ago.       Denies fevers/chills, SOB/cough, chest pain, bleeding issues,  N/V,  constipation, rashes/sores, night sweats, new pains, new lumps/bumps. edema, mouth sores.    Physical Exam:    /80 (BP Location: Right arm, Patient Position: Sitting, Cuff Size: Adult Regular)   Pulse 77   Temp 98  F (36.7  C) (Oral)   Resp 16   Wt 69 kg (152 lb 3.2 oz)   SpO2 100%   BMI  25.72 kg/m      Wt Readings from Last 5 Encounters:   04/26/24 69 kg (152 lb 3.2 oz)   03/29/24 70.8 kg (156 lb 1.6 oz)   03/01/24 70.3 kg (154 lb 14.4 oz)   02/28/24 69.4 kg (153 lb)   02/13/24 68.9 kg (152 lb)       Constitutional: Appears stated age, well-groomed.  Accompanied by self. In no apparent distress.   HEENT: Normocephilic. EOMI, PERRL. Sclerae are anicteric.   Respiratory: No increased work of breathing, good air exchange, clear to auscultation bilaterally, no crackles or wheezing.  Cardiovascular: Normal apical impulse, regular rate and rhythm, normal S1 and S2, and no murmur noted.  GI: +BS. Soft. No tenderness on palpation.  Skin: No bruising or bleeding, normal skin color, texture, turgor, no redness, warmth, or swelling, no rashes, no lesions, no jaundice.  Extremities: Mild tenderness to palpation in left upper extremity starting on medial side of mid-forearm, extending up the arm to a few inches proximal of the elbow; No bilateral lower extremity edema.  Neurologic: Awake, alert, oriented to name, place and time.    Vascular access: None      Allergies   Allergen Reactions    Blood Transfusion Related (Informational Only) Other (See Comments)     Patient has a history of a clinically significant antibody against RBC antigens.  A delay in compatible RBCs may occur.      Current Outpatient Medications   Medication Sig Dispense Refill    acyclovir (ZOVIRAX) 400 MG tablet Take 1 tablet (400 mg) by mouth 2 times daily 60 tablet 3    albuterol (PROAIR HFA/PROVENTIL HFA/VENTOLIN HFA) 108 (90 Base) MCG/ACT inhaler Inhale 2 puffs into the lungs 4 times daily 18 g 0    aspirin (ASA) 81 MG EC tablet Take 1 tablet (81 mg) by mouth daily      calcium carbonate-vitamin D (OSCAL) 500-5 MG-MCG tablet TAKE ONE TABLET BY MOUTH ONCE DAILY 90 tablet 3    LENalidomide (REVLIMID) 10 MG CAPS capsule Take 1 capsule (10 mg) by mouth daily 21 capsule 0    LENalidomide (REVLIMID) 10 MG CAPS capsule Take 1 capsule (10 mg)  "by mouth daily 21 capsule 0    levothyroxine (SYNTHROID/LEVOTHROID) 125 MCG tablet Take 1 tablet (125 mcg) by mouth daily 90 tablet 1    Needle, Disp, (B-D BLUNT FILL NEEDLE) 18G X 1-1/2\" MISC For use with testosterone injection: Disposable syringe and needles, use one 18 G needle to draw up medication then switch to 23 G injection needle 25 each 1    Needle, Disp, (BD DISP NEEDLE) 23G X 1\" MISC For use with testosterone injection: Disposable syringe and needles, use one 18 G needle to draw up medication then switch to 23 G injection needle 25 each 1    potassium chloride ER (KLOR-CON M) 10 MEQ CR tablet Take 2 tablets (20 mEq) by mouth daily 60 tablet 3    rosuvastatin (CRESTOR) 40 MG tablet Take 1 tablet (40 mg) by mouth daily 90 tablet 3    syringe, disposable, 1 ML MISC For use with testosterone injection: Disposable syringe and 2 needles, use one 18 G needle to draw up medication then switch to 23 G injection needle 25 each 1    testosterone cypionate (DEPOTESTOSTERONE) 200 MG/ML injection Inject 0.75 mLs (150 mg) into the muscle every 14 days 10 mL 0    vitamin B complex with vitamin C (STRESS TAB) tablet Take 1 tablet by mouth daily 90 tablet 3     No current facility-administered medications for this visit.     Most Recent 3 CBC's:  Recent Labs   Lab Test 03/29/24  1405 03/01/24  1416 02/02/24  1317   WBC 4.3 4.7 6.2   HGB 11.6* 12.0* 12.0*   MCV 87 89 89    392 334   ANEUTAUTO 2.1 2.7 3.5     Most Recent 3 BMP's:  Recent Labs   Lab Test 03/29/24  1405 03/01/24  1416 02/02/24  1317    139 137   POTASSIUM 4.4 3.5 4.0   CHLORIDE 105 106 105   CO2 22 22 23   BUN 12.5 11.7 13.1   CR 1.13 1.19* 1.16   ANIONGAP 11 11 9   HARDEEP 8.9 8.6 9.2   * 139* 115*   PROTTOTAL 6.3* 6.2* 6.4   ALBUMIN 4.2 4.3 4.4    Most Recent 3 LFT's:  Recent Labs   Lab Test 03/29/24  1405 03/01/24  1416 02/02/24  1317   AST 26 23 24   ALT 16 11 15   ALKPHOS 58 57 59   BILITOTAL 0.3 0.3 0.4    Most Recent 2 TSH and " T4:  Recent Labs   Lab Test 01/08/24  0713 06/26/23  1442 12/19/22  1103 06/17/22  1332 11/14/21  1103 10/20/21  1145   TSH 0.86 0.58   < > 0.07*   < > 0.70   T4  --   --   --  1.36  --  0.97    < > = values in this interval not displayed.     I reviewed the above labs today.    Assessment and Plan  Multiple myeloma with high risk cytogenetics  - Miracle-RVD with VGPR, then underwent autologous transplant 11/11/21.   - Given his high risk cytogenetics he started miracle + revlimid maintenance therapy 2/21/22.  - Completed 2 years of zometa in Sept 2023. Continue Vit D.   - Revlimid was decreased to 21/28 days in Nov 2023 to try to minimize side effects (dysgeusia, flu-like symptoms on days following daratumumab)  - Continue miracle + revlimid  - Continue acyclovir daily for viral ppx   - Received influenza vaccine 12/15/23  - COVID + 10/19/23.  Received COVID booster 02/02/24   - Pneumococcal vaccine given 02/02/24   - Got first Shingrix vaccine 3/4/24    Hypogammaglobulinemia   Start monthly IV Ig if IgG <300  - 11/16/23 IgG 438  - 2/2/24 IgG 465    Normocytic Anemia:   - Likely d/t revlimid and daratumumab.   - Will keep monitoring and dose reduce if needed.      Elevated BP:    -Previously discussed his elevated BP in clinic and recommended following with his PCP.    - He has been taking his BP at home and they are within a normal range.    Left elbow pain  History and exam most consistent with a tendonitis or bursitis in the elbow. Discussed x-ray today and Logan agreed, but left before it was able to be scheduled (I think he probably forgot about our discussion after he went to infusion).  - Recommend ice for 20 minutes three times daily and Voltaren gel for pain relief/anti-inflammatory. If not improved in 7-14 days, he will let us know and we will pursue imaging (would get x-ray first, if negative could get MRI vs ortho consult for further evaluation)    [facility] [place]  Date of visit:  04/26/2024        Oncologic History  - 4/30/2021 M spike of 34.8 in urine.M spike in blood 0.2; IgG 702 with depressed IgA and IgM. Beta 2 microglobulin 2.7. Lambda  with K/L ratio of 0.01. WBC 11.1 with absolute eos of 1.0. hgb, plt, Cr (1.1), and albumin WNL.    -4/30/2021 CT abd/pelvis showed sclerotic marrow changes with numerous lytic appearing lesions throughout the imaged portions of the spine, pelvis and ribs.      -PET scan 5/11/2021 Numerous osteolytic lesions which predominantly demonstrate uptake below liver background levels. There is one intraosseous lesion in the left lateral 9th rib that demonstrates uptake above background, possibly due to nondisplaced fracture. Adjacent to this lesion is mild hypermetabolism to the left pleura, with new small left pleural effusion, suspicious for malignant effusion versus posttraumatic effusion. Pleural uptake may represent extramedullary myeloma extending along the intercostal space versus inflammation.    - BM bx 5/17/2021 with flow showing 4.0% plasma cells which express CD19 (dim), CD38 (bright), CD45 (dim), , and monotypic cytoplasmic lambda immunoglobulin light chains but lack CD20 and CD56.  Hypercellular bone marrow for age (approximately 75% cellularity) with adequate trilineage hematopoiesis. Plasma cell infiltrate: Interstitial, lambda monotypic and representing approximately 60% the bone marrow cellularity, by  immunohistochemical stain. Cytpgenetics with 2 related hypodiploid clones. One with loss of Y, monosomy 13 and 14 (5%) and one with translocation of 8p and 14, derivative 16 with long arm replaced by extra copy 1q,monosomy 21. FISH showed gain of 1q, loss of 13 and 14, IGH-MYC fusion t(8:14)    - Started Miracle-RVd 21 day with velcade on days 1,8,15.     -Cycle 2 Miracle-RVd. Dose reduce dex to 80 mg d/t fatigue and stomach upset on dex days. Also having cough with basilar streaking on CXR    - 8/31/2021 BM bx -rare suspicious plasma  cells in touch imprint. No increase in plasma cells by morph.    -8/31/2021 PET/CT Diffuse osteolytic lesions throughout the axial and appendicular skeleton. Increased sclerosis since prior exam 5/11/2021 without increased metabolism or size.  Hypermetabolic pretracheal lymph node as well as hypermetabolic level 2 lymph nodes within the right neck. These lymph nodes are likely reactive from autoimmune activation via medical therapy. Hypermetabolic subcentimeter nodules within the thyroid gland    - 11/11/2021 Autologous BMT    - 2/21/22 started maintenance with Revlimid 10 mg and daratumumab monthly; did Revlimid alone for C1 due to low IgG levels, added daratumumab starting C2.  Initially did Revlimid daily, decreased to 21/28 days in Nov 2023 to help minimize side effects    Interval Hx:   Patient returns to clinic today for routine follow-up prior to his next steroid dose.  He restarted his Revlimid on 4/24/2024.  Unfortunately he is developed diarrhea last night 3 episodes at that time.  The diarrhea continues throughout the day today with an additional 3 episodes.  At baseline he has 1 episode today.  He did take 2 tablets of Pepto-Bismol last night and additional tablets throughout the day today.  He has no nausea or vomiting.  He endorses fatigue today.  No sweating.  He has been trying to push fluids with Gatorade, electrolyte powder and his water, and ginger ale but does feel like he has to watch how many fluids he takes at once as it tends to upset his stomach.  He has had several individuals comes in contact symptoms.  His granddaughter had a GI bug with diarrhea about a month ago and continues to have abdominal pain symptoms to his last week.  His son also had a GI bleed approximately a month ago.  His wife has been feeling little bit strange with diarrhea in the last 36 hours as well. Diarrhea about 3 to 5 weeks ago. No fevers or chills. No myalgias. Left elbow pain has improved with stretching. No  rashes.     Physical Exam:  /80 (BP Location: Right arm, Patient Position: Sitting, Cuff Size: Adult Regular)   Pulse 77   Temp 98  F (36.7  C) (Oral)   Resp 16   Wt 69 kg (152 lb 3.2 oz)   SpO2 100%   BMI 25.72 kg/m      Wt Readings from Last 5 Encounters:   04/26/24 69 kg (152 lb 3.2 oz)   03/29/24 70.8 kg (156 lb 1.6 oz)   03/01/24 70.3 kg (154 lb 14.4 oz)   02/28/24 69.4 kg (153 lb)   02/13/24 68.9 kg (152 lb)       Constitutional: Appears stated age, well-groomed. Appears fatigued.   HEENT: Normocephilic. EOMI, PERRL. Sclerae are anicteric.   Respiratory: No increased work of breathing, good air exchange, clear to auscultation bilaterally, no crackles or wheezing.  Cardiovascular: Normal apical impulse, regular rate and rhythm, normal S1 and S2, and no murmur noted.  GI: +BS. Soft. No tenderness on palpation.  Skin: No bruising or bleeding, normal skin color, texture, turgor, no redness, warmth, or swelling, no rashes, no lesions, no jaundice.  Neurologic: Awake, alert, oriented to name, place and time.    Vascular access: PIV      Allergies   Allergen Reactions     Blood Transfusion Related (Informational Only) Other (See Comments)     Patient has a history of a clinically significant antibody against RBC antigens.  A delay in compatible RBCs may occur.      Current Outpatient Medications   Medication Sig Dispense Refill     acyclovir (ZOVIRAX) 400 MG tablet Take 1 tablet (400 mg) by mouth 2 times daily 60 tablet 3     albuterol (PROAIR HFA/PROVENTIL HFA/VENTOLIN HFA) 108 (90 Base) MCG/ACT inhaler Inhale 2 puffs into the lungs 4 times daily 18 g 0     aspirin (ASA) 81 MG EC tablet Take 1 tablet (81 mg) by mouth daily       calcium carbonate-vitamin D (OSCAL) 500-5 MG-MCG tablet TAKE ONE TABLET BY MOUTH ONCE DAILY 90 tablet 3     LENalidomide (REVLIMID) 10 MG CAPS capsule Take 1 capsule (10 mg) by mouth daily 21 capsule 0     LENalidomide (REVLIMID) 10 MG CAPS capsule Take 1 capsule (10 mg) by mouth  "daily 21 capsule 0     levothyroxine (SYNTHROID/LEVOTHROID) 125 MCG tablet Take 1 tablet (125 mcg) by mouth daily 90 tablet 1     Needle, Disp, (B-D BLUNT FILL NEEDLE) 18G X 1-1/2\" MISC For use with testosterone injection: Disposable syringe and needles, use one 18 G needle to draw up medication then switch to 23 G injection needle 25 each 1     Needle, Disp, (BD DISP NEEDLE) 23G X 1\" MISC For use with testosterone injection: Disposable syringe and needles, use one 18 G needle to draw up medication then switch to 23 G injection needle 25 each 1     potassium chloride ER (KLOR-CON M) 10 MEQ CR tablet Take 2 tablets (20 mEq) by mouth daily 60 tablet 3     rosuvastatin (CRESTOR) 40 MG tablet Take 1 tablet (40 mg) by mouth daily 90 tablet 3     syringe, disposable, 1 ML MISC For use with testosterone injection: Disposable syringe and 2 needles, use one 18 G needle to draw up medication then switch to 23 G injection needle 25 each 1     testosterone cypionate (DEPOTESTOSTERONE) 200 MG/ML injection Inject 0.75 mLs (150 mg) into the muscle every 14 days 10 mL 0     vitamin B complex with vitamin C (STRESS TAB) tablet Take 1 tablet by mouth daily 90 tablet 3     No current facility-administered medications for this visit.     Most Recent 3 CBC's:  Recent Labs   Lab Test 04/26/24  1425 03/29/24  1405 03/01/24  1416   WBC 4.2 4.3 4.7   HGB 12.6* 11.6* 12.0*   MCV 87 87 89    370 392   ANEUTAUTO 1.5* 2.1 2.7     Most Recent 3 BMP's:  Recent Labs   Lab Test 04/26/24  1425 03/29/24  1405 03/01/24  1416    138 139   POTASSIUM 3.7 4.4 3.5   CHLORIDE 105 105 106   CO2 19* 22 22   BUN 18.5 12.5 11.7   CR 1.17 1.13 1.19*   ANIONGAP 12 11 11   HARDEEP 8.0* 8.9 8.6   GLC 93 109* 139*   PROTTOTAL 6.5 6.3* 6.2*   ALBUMIN 4.4 4.2 4.3    Most Recent 3 LFT's:  Recent Labs   Lab Test 04/26/24  1425 03/29/24  1405 03/01/24  1416   AST 32 26 23   ALT 15 16 11   ALKPHOS 60 58 57   BILITOTAL 0.3 0.3 0.3    Most Recent 2 TSH and " T4:  Recent Labs   Lab Test 01/08/24  0713 06/26/23  1442 12/19/22  1103 06/17/22  1332 11/14/21  1103 10/20/21  1145   TSH 0.86 0.58   < > 0.07*   < > 0.70   T4  --   --   --  1.36  --  0.97    < > = values in this interval not displayed.     I reviewed the above labs today.    Assessment and Plan  Multiple myeloma with high risk cytogenetics  - Miracle-RVD with VGPR, then underwent autologous transplant 11/11/21.   - Given his high risk cytogenetics he started miracle + revlimid maintenance therapy 2/21/22.  - Completed 2 years of zometa in Sept 2023. Continue Vit D.   - Revlimid was decreased to 21/28 days in Nov 2023 to try to minimize side effects (dysgeusia, flu-like symptoms on days following daratumumab)  - Continue miracle. Hold 1 week of revlimid due to suspect viral gastroenteritis. Stool studies today. Suspect mild hypocalcemia is secondary to GI losses. ANC is slightly below baseline at 1.5, again may be due to viral illness. Patient to send a mychart if symptoms continue into next week.   - Continue acyclovir daily for viral ppx   - Received influenza vaccine 12/15/23  - COVID + 10/19/23.  Received COVID booster 02/02/24   - Pneumococcal vaccine given 02/02/24   - Got first Shingrix vaccine 3/4/24    Diarrhea  - stool studies today, C. Diff and viral panel. Discussed okay to continue supportive measures with fluids, BRAT diet, and rest. Ok to try imodium but will need to hold pending stool study results.   - Add in 1 L NS today.   - counseled on red flags and when to see further care.     Hypogammaglobulinemia   Start monthly IV Ig if IgG <300  - 11/16/23 IgG 438  - 2/2/24 IgG 465    Normocytic Anemia:   - Likely d/t revlimid and daratumumab.   - Will keep monitoring and dose reduce if needed.      Elevated BP:    -Previously discussed his elevated BP in clinic and recommended following with his PCP.    - He has been taking his BP at home and they are within a normal range.  - BP WNL today 4/26/24.     Left  elbow pain  - improving with stretching.     30 minutes spent on the date of the encounter doing chart review, review of test results, interpretation of tests, patient visit, and documentation     Yasmin Johnson PA-C        Again, thank you for allowing me to participate in the care of your patient.        Sincerely,        Yasmin Johnson PA-C

## 2024-04-26 NOTE — NURSING NOTE
"Oncology Rooming Note    April 26, 2024 2:47 PM   June Ronquillo is a 53 year old male who presents for:    Chief Complaint   Patient presents with    Blood Draw     Labs drawn with .  VS taken.    Oncology Clinic Visit     Multiple myeloma not having achieved remission      Initial Vitals: /80 (BP Location: Right arm, Patient Position: Sitting, Cuff Size: Adult Regular)   Pulse 77   Temp 98  F (36.7  C) (Oral)   Resp 16   Wt 69 kg (152 lb 3.2 oz)   SpO2 100%   BMI 25.72 kg/m   Estimated body mass index is 25.72 kg/m  as calculated from the following:    Height as of 2/28/24: 1.638 m (5' 4.5\").    Weight as of this encounter: 69 kg (152 lb 3.2 oz). Body surface area is 1.77 meters squared.  No Pain (0) Comment: Data Unavailable   No LMP for male patient.  Allergies reviewed: Yes  Medications reviewed: Yes    Medications: Medication refills not needed today.  Pharmacy name entered into [facility]:    [facility] [place], [place] - [address]  [facility] - [place], [place] - [address] [facility] [place], [place] [id number]  [facility] [place], [place] - [address] [facility]  [facility] - [place], [place] - [address]    Frailty Screening:   Is the patient here for a new oncology consult visit in cancer care? 2. No      Clinical concerns: Pt is dizzy today and also had diarrhea last night and today.        Jef Katz" Case also d/w Dr. Oren dunaway, will see pt. Pt with transient episodes of tongue deviation, facial asymmetry.  Case d/w Dr. Lopez Telestroke, pt is neither a Tenecteplase or thrombectomy candidate.  Pt does not need to be transferred.  Will have Neuro see pt, also get MRI brain Labs/CT head explained to pt  Pt with poss TIA, Labs/CT head explained to pt  Pt with poss TIA, mostly anxiety, psychosomatic, will admit pt for MRI.  Case d/w Dr. Pal  Pt's now with no facial asymmetry, no tongue, ambulating with no diff., no dysphasia. No IVC given, pt with reaction yest. with her CT A/P.  Pt with transient episodes of tongue deviation, facial asymmetry.  Case d/w Dr. Lopez Telestroke, pt is neither a Tenecteplase or thrombectomy candidate.  Pt does not need to be transferred.  Will have Neuro see pt, also get MRI brain

## 2024-04-26 NOTE — PROGRESS NOTES
Infusion Nursing Note:  June Ronquillo presents today for C30D1 Darzalex Faspro, and 1000 mL NS bolus..    Patient seen by provider today: Yes: Yasmin Johnson   present during visit today: Not Applicable.    Note: Patient arrives in infusion post provider visit. No new complaints or concerns. Patient denies having pain today.    TORB: 4/26/2024 Yasmin Johnson/Skinny Jensen: 1000 mL NS bolus.       Intravenous Access:  Peripheral IV placed.    Treatment Conditions:  Lab Results   Component Value Date    HGB 12.6 (L) 04/26/2024    WBC 4.2 04/26/2024    ANEU 4.7 03/13/2023    ANEUTAUTO 1.5 (L) 04/26/2024     04/26/2024        Lab Results   Component Value Date     04/26/2024    POTASSIUM 3.7 04/26/2024    MAG 2.6 (H) 04/28/2023    CR 1.17 04/26/2024    HARDEEP 8.0 (L) 04/26/2024    BILITOTAL 0.3 04/26/2024    ALBUMIN 4.4 04/26/2024    ALT 15 04/26/2024    AST 32 04/26/2024       Results reviewed, labs MET treatment parameters, ok to proceed with treatment.      Post Infusion Assessment:  Patient tolerated infusion without incident.  Patient tolerated Darzalex Faspro injection in LLQ of abdomen without incident.  Blood return noted pre and post infusion.  Site patent and intact, free from redness, edema or discomfort.  No evidence of extravasations.  Access discontinued per protocol.       Discharge Plan:   Patient declined prescription refills.  Discharge instructions reviewed with: Patient.  Patient and/or family verbalized understanding of discharge instructions and all questions answered.  AVS to patient via [facility].  Patient will return 5/24/2024 for next appointment.   Patient discharged in stable condition accompanied by: self.  Departure Mode: Ambulatory.      Skinny Jensen RN

## 2024-04-26 NOTE — PROGRESS NOTES
[facility]  Date of visit: 04/26/2024        Oncologic History  - 4/30/2021 M spike of 34.8 in urine.M spike in blood 0.2; IgG 702 with depressed IgA and IgM. Beta 2 microglobulin 2.7. Lambda  with K/L ratio of 0.01. WBC 11.1 with absolute eos of 1.0. hgb, plt, Cr (1.1), and albumin WNL.    -4/30/2021 CT abd/pelvis showed sclerotic marrow changes with numerous lytic appearing lesions throughout the imaged portions of the spine, pelvis and ribs.      -PET scan 5/11/2021 Numerous osteolytic lesions which predominantly demonstrate uptake below liver background levels. There is one intraosseous lesion in the left lateral 9th rib that demonstrates uptake above background, possibly due to nondisplaced fracture. Adjacent to this lesion is mild hypermetabolism to the left pleura, with new small left pleural effusion, suspicious for malignant effusion versus posttraumatic effusion. Pleural uptake may represent extramedullary myeloma extending along the intercostal space versus inflammation.    - BM bx 5/17/2021 with flow showing 4.0% plasma cells which express CD19 (dim), CD38 (bright), CD45 (dim), , and monotypic cytoplasmic lambda immunoglobulin light chains but lack CD20 and CD56.  Hypercellular bone marrow for age (approximately 75% cellularity) with adequate trilineage hematopoiesis. Plasma cell infiltrate: Interstitial, lambda monotypic and representing approximately 60% the bone marrow cellularity, by  immunohistochemical stain. Cytpgenetics with 2 related hypodiploid clones. One with loss of Y, monosomy 13 and 14 (5%) and one with translocation of 8p and 14, derivative 16 with long arm replaced by extra copy 1q,monosomy 21. FISH showed gain of 1q, loss of 13 and 14, IGH-MYC fusion t(8:14)    - Started Miracle-RVd 21 day with velcade on days 1,8,15.     -Cycle 2 Miracle-RVd. Dose reduce dex to 80 mg d/t fatigue and stomach upset on dex days. Also having cough with basilar  streaking on CXR    - 8/31/2021 BM bx -rare suspicious plasma cells in touch imprint. No increase in plasma cells by morph.    -8/31/2021 PET/CT Diffuse osteolytic lesions throughout the axial and appendicular skeleton. Increased sclerosis since prior exam 5/11/2021 without increased metabolism or size.  Hypermetabolic pretracheal lymph node as well as hypermetabolic level 2 lymph nodes within the right neck. These lymph nodes are likely reactive from autoimmune activation via medical therapy. Hypermetabolic subcentimeter nodules within the thyroid gland    - 11/11/2021 Autologous BMT    - 2/21/22 started maintenance with Revlimid 10 mg and daratumumab monthly; did Revlimid alone for C1 due to low IgG levels, added daratumumab starting C2.  Initially did Revlimid daily, decreased to 21/28 days in Nov 2023 to help minimize side effects    Interval Hx:   Patient returns to clinic today for routine follow-up prior to his next steroid dose.  He restarted his Revlimid on 4/24/2024.  Unfortunately he is developed diarrhea last night 3 episodes at that time.  The diarrhea continues throughout the day today with an additional 3 episodes.  At baseline he has 1 episode today.  He did take 2 tablets of Pepto-Bismol last night and additional tablets throughout the day today.  He has no nausea or vomiting.  He endorses fatigue today.  No sweating.  He has been trying to push fluids with Gatorade, electrolyte powder and his water, and ginger ale but does feel like he has to watch how many fluids he takes at once as it tends to upset his stomach.  He has had several individuals comes in contact symptoms.  His granddaughter had a GI bug with diarrhea about a month ago and continues to have abdominal pain symptoms to his last week.  His son also had a GI bleed approximately a month ago.  His wife has been feeling little bit strange with diarrhea in the last 36 hours as well. Diarrhea about 3 to 5 weeks ago. No fevers or chills. No  myalgias. Left elbow pain has improved with stretching. No rashes.     Physical Exam:  /80 (BP Location: Right arm, Patient Position: Sitting, Cuff Size: Adult Regular)   Pulse 77   Temp 98  F (36.7  C) (Oral)   Resp 16   Wt 69 kg (152 lb 3.2 oz)   SpO2 100%   BMI 25.72 kg/m      Wt Readings from Last 5 Encounters:   04/26/24 69 kg (152 lb 3.2 oz)   03/29/24 70.8 kg (156 lb 1.6 oz)   03/01/24 70.3 kg (154 lb 14.4 oz)   02/28/24 69.4 kg (153 lb)   02/13/24 68.9 kg (152 lb)       Constitutional: Appears stated age, well-groomed. Appears fatigued.   HEENT: Normocephilic. EOMI, PERRL. Sclerae are anicteric.   Respiratory: No increased work of breathing, good air exchange, clear to auscultation bilaterally, no crackles or wheezing.  Cardiovascular: Normal apical impulse, regular rate and rhythm, normal S1 and S2, and no murmur noted.  GI: +BS. Soft. No tenderness on palpation.  Skin: No bruising or bleeding, normal skin color, texture, turgor, no redness, warmth, or swelling, no rashes, no lesions, no jaundice.  Neurologic: Awake, alert, oriented to name, place and time.    Vascular access: PIV      Allergies   Allergen Reactions    Blood Transfusion Related (Informational Only) Other (See Comments)     Patient has a history of a clinically significant antibody against RBC antigens.  A delay in compatible RBCs may occur.      Current Outpatient Medications   Medication Sig Dispense Refill    acyclovir (ZOVIRAX) 400 MG tablet Take 1 tablet (400 mg) by mouth 2 times daily 60 tablet 3    albuterol (PROAIR HFA/PROVENTIL HFA/VENTOLIN HFA) 108 (90 Base) MCG/ACT inhaler Inhale 2 puffs into the lungs 4 times daily 18 g 0    aspirin (ASA) 81 MG EC tablet Take 1 tablet (81 mg) by mouth daily      calcium carbonate-vitamin D (OSCAL) 500-5 MG-MCG tablet TAKE ONE TABLET BY MOUTH ONCE DAILY 90 tablet 3    LENalidomide (REVLIMID) 10 MG CAPS capsule Take 1 capsule (10 mg) by mouth daily 21 capsule 0    LENalidomide  "(REVLIMID) 10 MG CAPS capsule Take 1 capsule (10 mg) by mouth daily 21 capsule 0    levothyroxine (SYNTHROID/LEVOTHROID) 125 MCG tablet Take 1 tablet (125 mcg) by mouth daily 90 tablet 1    Needle, Disp, (B-D BLUNT FILL NEEDLE) 18G X 1-1/2\" MISC For use with testosterone injection: Disposable syringe and needles, use one 18 G needle to draw up medication then switch to 23 G injection needle 25 each 1    Needle, Disp, (BD DISP NEEDLE) 23G X 1\" MISC For use with testosterone injection: Disposable syringe and needles, use one 18 G needle to draw up medication then switch to 23 G injection needle 25 each 1    potassium chloride ER (KLOR-CON M) 10 MEQ CR tablet Take 2 tablets (20 mEq) by mouth daily 60 tablet 3    rosuvastatin (CRESTOR) 40 MG tablet Take 1 tablet (40 mg) by mouth daily 90 tablet 3    syringe, disposable, 1 ML MISC For use with testosterone injection: Disposable syringe and 2 needles, use one 18 G needle to draw up medication then switch to 23 G injection needle 25 each 1    testosterone cypionate (DEPOTESTOSTERONE) 200 MG/ML injection Inject 0.75 mLs (150 mg) into the muscle every 14 days 10 mL 0    vitamin B complex with vitamin C (STRESS TAB) tablet Take 1 tablet by mouth daily 90 tablet 3     No current facility-administered medications for this visit.     Most Recent 3 CBC's:  Recent Labs   Lab Test 04/26/24  1425 03/29/24  1405 03/01/24  1416   WBC 4.2 4.3 4.7   HGB 12.6* 11.6* 12.0*   MCV 87 87 89    370 392   ANEUTAUTO 1.5* 2.1 2.7     Most Recent 3 BMP's:  Recent Labs   Lab Test 04/26/24  1425 03/29/24  1405 03/01/24  1416    138 139   POTASSIUM 3.7 4.4 3.5   CHLORIDE 105 105 106   CO2 19* 22 22   BUN 18.5 12.5 11.7   CR 1.17 1.13 1.19*   ANIONGAP 12 11 11   HARDEEP 8.0* 8.9 8.6   GLC 93 109* 139*   PROTTOTAL 6.5 6.3* 6.2*   ALBUMIN 4.4 4.2 4.3    Most Recent 3 LFT's:  Recent Labs   Lab Test 04/26/24  1425 03/29/24  1405 03/01/24  1416   AST 32 26 23   ALT 15 16 11   ALKPHOS 60 58 57 "   BILITOTAL 0.3 0.3 0.3    Most Recent 2 TSH and T4:  Recent Labs   Lab Test 01/08/24  0713 06/26/23  1442 12/19/22  1103 06/17/22  1332 11/14/21  1103 10/20/21  1145   TSH 0.86 0.58   < > 0.07*   < > 0.70   T4  --   --   --  1.36  --  0.97    < > = values in this interval not displayed.     I reviewed the above labs today.    Assessment and Plan  Multiple myeloma with high risk cytogenetics  - Miracle-RVD with VGPR, then underwent autologous transplant 11/11/21.   - Given his high risk cytogenetics he started miracle + revlimid maintenance therapy 2/21/22.  - Completed 2 years of zometa in Sept 2023. Continue Vit D.   - Revlimid was decreased to 21/28 days in Nov 2023 to try to minimize side effects (dysgeusia, flu-like symptoms on days following daratumumab)  - Continue miracle. Hold 1 week of revlimid due to suspect viral gastroenteritis. Stool studies today. Suspect mild hypocalcemia is secondary to GI losses. ANC is slightly below baseline at 1.5, again may be due to viral illness. Patient to send a mychart if symptoms continue into next week.   - Continue acyclovir daily for viral ppx   - Received influenza vaccine 12/15/23  - COVID + 10/19/23.  Received COVID booster 02/02/24   - Pneumococcal vaccine given 02/02/24   - Got first Shingrix vaccine 3/4/24    Diarrhea  - stool studies today, C. Diff and viral panel. Discussed okay to continue supportive measures with fluids, BRAT diet, and rest. Ok to try imodium but will need to hold pending stool study results.   - Add in 1 L NS today.   - counseled on red flags and when to see further care.     Hypogammaglobulinemia   Start monthly IV Ig if IgG <300  - 11/16/23 IgG 438  - 2/2/24 IgG 465    Normocytic Anemia:   - Likely d/t revlimid and daratumumab.   - Will keep monitoring and dose reduce if needed.      Elevated BP:    -Previously discussed his elevated BP in clinic and recommended following with his PCP.    - He has been taking his BP at home and they are within a  normal range.  - BP WNL today 4/26/24.     Left elbow pain  - improving with stretching.     30 minutes spent on the date of the encounter doing chart review, review of test results, interpretation of tests, patient visit, and documentation     Yasmin Johnson PA-C

## 2024-04-27 LAB
ADV 40+41 DNA STL QL NAA+NON-PROBE: NEGATIVE
ASTRO TYP 1-8 RNA STL QL NAA+NON-PROBE: NEGATIVE
C CAYETANENSIS DNA STL QL NAA+NON-PROBE: NEGATIVE
C DIFF TOX B STL QL: NEGATIVE
CAMPYLOBACTER DNA SPEC NAA+PROBE: NEGATIVE
CRYPTOSP DNA STL QL NAA+NON-PROBE: NEGATIVE
E COLI O157 DNA STL QL NAA+NON-PROBE: ABNORMAL
E HISTOLYT DNA STL QL NAA+NON-PROBE: NEGATIVE
EAEC ASTA GENE ISLT QL NAA+PROBE: NEGATIVE
EC STX1+STX2 GENES STL QL NAA+NON-PROBE: NEGATIVE
EPEC EAE GENE STL QL NAA+NON-PROBE: NEGATIVE
ETEC LTA+ST1A+ST1B TOX ST NAA+NON-PROBE: NEGATIVE
G LAMBLIA DNA STL QL NAA+NON-PROBE: NEGATIVE
NOROVIRUS GI+II RNA STL QL NAA+NON-PROBE: NEGATIVE
P SHIGELLOIDES DNA STL QL NAA+NON-PROBE: NEGATIVE
RVA RNA STL QL NAA+NON-PROBE: POSITIVE
SALMONELLA SP RPOD STL QL NAA+PROBE: NEGATIVE
SAPO I+II+IV+V RNA STL QL NAA+NON-PROBE: NEGATIVE
SHIGELLA SP+EIEC IPAH ST NAA+NON-PROBE: NEGATIVE
TOTAL PROTEIN SERUM FOR ELP: 6.8 G/DL (ref 6.4–8.3)
V CHOLERAE DNA SPEC QL NAA+PROBE: NEGATIVE
VIBRIO DNA SPEC NAA+PROBE: NEGATIVE
Y ENTEROCOL DNA STL QL NAA+PROBE: NEGATIVE

## 2024-04-27 PROCEDURE — 87507 IADNA-DNA/RNA PROBE TQ 12-25: CPT

## 2024-04-27 PROCEDURE — 99000 SPECIMEN HANDLING OFFICE-LAB: CPT | Performed by: PATHOLOGY

## 2024-04-27 PROCEDURE — 87493 C DIFF AMPLIFIED PROBE: CPT | Mod: XU

## 2024-04-29 LAB
ALBUMIN SERPL ELPH-MCNC: 4.5 G/DL (ref 3.7–5.1)
ALPHA1 GLOB SERPL ELPH-MCNC: 0.3 G/DL (ref 0.2–0.4)
ALPHA2 GLOB SERPL ELPH-MCNC: 0.7 G/DL (ref 0.5–0.9)
B-GLOBULIN SERPL ELPH-MCNC: 0.8 G/DL (ref 0.6–1)
GAMMA GLOB SERPL ELPH-MCNC: 0.5 G/DL (ref 0.7–1.6)
IGA SERPL-MCNC: 54 MG/DL (ref 84–499)
IGG SERPL-MCNC: 485 MG/DL (ref 610–1616)
IGM SERPL-MCNC: 19 MG/DL (ref 35–242)
KAPPA LC FREE SER-MCNC: 0.72 MG/DL (ref 0.33–1.94)
KAPPA LC FREE/LAMBDA FREE SER NEPH: 1.47 {RATIO} (ref 0.26–1.65)
LAMBDA LC FREE SERPL-MCNC: 0.49 MG/DL (ref 0.57–2.63)
M PROTEIN SERPL ELPH-MCNC: 0.1 G/DL
PROT PATTERN SERPL ELPH-IMP: ABNORMAL
PROT PATTERN SERPL IFE-IMP: NORMAL

## 2024-05-02 RX ORDER — DEXAMETHASONE 4 MG/1
12 TABLET ORAL ONCE
Status: CANCELLED
Start: 2024-05-31 | End: 2024-05-31

## 2024-05-02 RX ORDER — NALOXONE HYDROCHLORIDE 0.4 MG/ML
0.2 INJECTION, SOLUTION INTRAMUSCULAR; INTRAVENOUS; SUBCUTANEOUS
Status: CANCELLED | OUTPATIENT
Start: 2024-05-31

## 2024-05-02 RX ORDER — ACETAMINOPHEN 325 MG/1
650 TABLET ORAL ONCE
Status: CANCELLED
Start: 2024-05-31 | End: 2024-05-31

## 2024-05-02 RX ORDER — DIPHENHYDRAMINE HYDROCHLORIDE 50 MG/ML
50 INJECTION INTRAMUSCULAR; INTRAVENOUS
Status: CANCELLED
Start: 2024-05-31

## 2024-05-02 RX ORDER — METHYLPREDNISOLONE SODIUM SUCCINATE 125 MG/2ML
125 INJECTION, POWDER, LYOPHILIZED, FOR SOLUTION INTRAMUSCULAR; INTRAVENOUS
Status: CANCELLED
Start: 2024-05-31

## 2024-05-02 RX ORDER — HEPARIN SODIUM,PORCINE 10 UNIT/ML
5 VIAL (ML) INTRAVENOUS
Status: CANCELLED | OUTPATIENT
Start: 2024-05-31

## 2024-05-02 RX ORDER — MEPERIDINE HYDROCHLORIDE 25 MG/ML
25 INJECTION INTRAMUSCULAR; INTRAVENOUS; SUBCUTANEOUS EVERY 30 MIN PRN
Status: CANCELLED | OUTPATIENT
Start: 2024-05-31

## 2024-05-02 RX ORDER — DIPHENHYDRAMINE HCL 25 MG
50 CAPSULE ORAL ONCE
Status: CANCELLED
Start: 2024-05-31 | End: 2024-05-31

## 2024-05-02 RX ORDER — ALBUTEROL SULFATE 0.83 MG/ML
2.5 SOLUTION RESPIRATORY (INHALATION)
Status: CANCELLED | OUTPATIENT
Start: 2024-05-31

## 2024-05-02 RX ORDER — EPINEPHRINE 1 MG/ML
0.3 INJECTION, SOLUTION INTRAMUSCULAR; SUBCUTANEOUS EVERY 5 MIN PRN
Status: CANCELLED | OUTPATIENT
Start: 2024-05-31

## 2024-05-02 RX ORDER — ALBUTEROL SULFATE 90 UG/1
1-2 AEROSOL, METERED RESPIRATORY (INHALATION)
Status: CANCELLED
Start: 2024-05-31

## 2024-05-02 RX ORDER — HEPARIN SODIUM (PORCINE) LOCK FLUSH IV SOLN 100 UNIT/ML 100 UNIT/ML
5 SOLUTION INTRAVENOUS
Status: CANCELLED | OUTPATIENT
Start: 2024-05-31

## 2024-05-03 DIAGNOSIS — C90.00 MULTIPLE MYELOMA NOT HAVING ACHIEVED REMISSION (H): Primary | ICD-10-CM

## 2024-05-03 RX ORDER — LENALIDOMIDE 10 MG/1
10 CAPSULE ORAL DAILY
Qty: 21 CAPSULE | Refills: 0 | Status: SHIPPED | OUTPATIENT
Start: 2024-05-03 | End: 2024-09-27

## 2024-05-09 ENCOUNTER — TELEPHONE (OUTPATIENT)
Dept: ONCOLOGY | Facility: CLINIC | Age: 54
End: 2024-05-09
Payer: COMMERCIAL

## 2024-05-09 DIAGNOSIS — C90.00 MULTIPLE MYELOMA NOT HAVING ACHIEVED REMISSION (H): ICD-10-CM

## 2024-05-09 RX ORDER — LENALIDOMIDE 10 MG/1
CAPSULE ORAL
Refills: 0 | OUTPATIENT
Start: 2024-05-09

## 2024-05-14 ENCOUNTER — ONCOLOGY VISIT (OUTPATIENT)
Dept: ONCOLOGY | Facility: CLINIC | Age: 54
End: 2024-05-14
Attending: STUDENT IN AN ORGANIZED HEALTH CARE EDUCATION/TRAINING PROGRAM
Payer: COMMERCIAL

## 2024-05-14 ENCOUNTER — PATIENT OUTREACH (OUTPATIENT)
Dept: ONCOLOGY | Facility: CLINIC | Age: 54
End: 2024-05-14

## 2024-05-14 ENCOUNTER — APPOINTMENT (OUTPATIENT)
Dept: LAB | Facility: CLINIC | Age: 54
End: 2024-05-14
Attending: STUDENT IN AN ORGANIZED HEALTH CARE EDUCATION/TRAINING PROGRAM
Payer: COMMERCIAL

## 2024-05-14 VITALS
TEMPERATURE: 98 F | HEART RATE: 60 BPM | BODY MASS INDEX: 26.19 KG/M2 | WEIGHT: 155 LBS | RESPIRATION RATE: 16 BRPM | OXYGEN SATURATION: 98 % | DIASTOLIC BLOOD PRESSURE: 79 MMHG | SYSTOLIC BLOOD PRESSURE: 136 MMHG

## 2024-05-14 DIAGNOSIS — C90.00 MULTIPLE MYELOMA NOT HAVING ACHIEVED REMISSION (H): Primary | ICD-10-CM

## 2024-05-14 LAB
ALBUMIN SERPL BCG-MCNC: 4.3 G/DL (ref 3.5–5.2)
ALP SERPL-CCNC: 54 U/L (ref 40–150)
ALT SERPL W P-5'-P-CCNC: 14 U/L (ref 0–70)
ANION GAP SERPL CALCULATED.3IONS-SCNC: 12 MMOL/L (ref 7–15)
AST SERPL W P-5'-P-CCNC: 31 U/L (ref 0–45)
BILIRUB SERPL-MCNC: 0.5 MG/DL
BUN SERPL-MCNC: 12.2 MG/DL (ref 6–20)
CALCIUM SERPL-MCNC: 8.7 MG/DL (ref 8.6–10)
CHLORIDE SERPL-SCNC: 104 MMOL/L (ref 98–107)
CREAT SERPL-MCNC: 1.11 MG/DL (ref 0.67–1.17)
DEPRECATED HCO3 PLAS-SCNC: 22 MMOL/L (ref 22–29)
EGFRCR SERPLBLD CKD-EPI 2021: 78 ML/MIN/1.73M2
GLUCOSE SERPL-MCNC: 104 MG/DL (ref 70–99)
POTASSIUM SERPL-SCNC: 3.4 MMOL/L (ref 3.4–5.3)
PROT SERPL-MCNC: 6.2 G/DL (ref 6.4–8.3)
SODIUM SERPL-SCNC: 138 MMOL/L (ref 135–145)

## 2024-05-14 PROCEDURE — 84165 PROTEIN E-PHORESIS SERUM: CPT | Mod: 26 | Performed by: PATHOLOGY

## 2024-05-14 PROCEDURE — 90471 IMMUNIZATION ADMIN: CPT | Performed by: STUDENT IN AN ORGANIZED HEALTH CARE EDUCATION/TRAINING PROGRAM

## 2024-05-14 PROCEDURE — 82784 ASSAY IGA/IGD/IGG/IGM EACH: CPT | Performed by: STUDENT IN AN ORGANIZED HEALTH CARE EDUCATION/TRAINING PROGRAM

## 2024-05-14 PROCEDURE — 84155 ASSAY OF PROTEIN SERUM: CPT | Mod: 91 | Performed by: STUDENT IN AN ORGANIZED HEALTH CARE EDUCATION/TRAINING PROGRAM

## 2024-05-14 PROCEDURE — 250N000011 HC RX IP 250 OP 636: Performed by: STUDENT IN AN ORGANIZED HEALTH CARE EDUCATION/TRAINING PROGRAM

## 2024-05-14 PROCEDURE — 99213 OFFICE O/P EST LOW 20 MIN: CPT | Performed by: STUDENT IN AN ORGANIZED HEALTH CARE EDUCATION/TRAINING PROGRAM

## 2024-05-14 PROCEDURE — 80053 COMPREHEN METABOLIC PANEL: CPT | Performed by: STUDENT IN AN ORGANIZED HEALTH CARE EDUCATION/TRAINING PROGRAM

## 2024-05-14 PROCEDURE — 86334 IMMUNOFIX E-PHORESIS SERUM: CPT | Performed by: PATHOLOGY

## 2024-05-14 PROCEDURE — 90472 IMMUNIZATION ADMIN EACH ADD: CPT | Performed by: STUDENT IN AN ORGANIZED HEALTH CARE EDUCATION/TRAINING PROGRAM

## 2024-05-14 PROCEDURE — 90707 MMR VACCINE SC: CPT | Performed by: STUDENT IN AN ORGANIZED HEALTH CARE EDUCATION/TRAINING PROGRAM

## 2024-05-14 PROCEDURE — 86334 IMMUNOFIX E-PHORESIS SERUM: CPT | Mod: 26 | Performed by: PATHOLOGY

## 2024-05-14 PROCEDURE — 90632 HEPA VACCINE ADULT IM: CPT | Performed by: STUDENT IN AN ORGANIZED HEALTH CARE EDUCATION/TRAINING PROGRAM

## 2024-05-14 PROCEDURE — 36415 COLL VENOUS BLD VENIPUNCTURE: CPT | Performed by: STUDENT IN AN ORGANIZED HEALTH CARE EDUCATION/TRAINING PROGRAM

## 2024-05-14 PROCEDURE — 83521 IG LIGHT CHAINS FREE EACH: CPT | Mod: 91 | Performed by: STUDENT IN AN ORGANIZED HEALTH CARE EDUCATION/TRAINING PROGRAM

## 2024-05-14 PROCEDURE — 84165 PROTEIN E-PHORESIS SERUM: CPT | Mod: TC | Performed by: PATHOLOGY

## 2024-05-14 PROCEDURE — 99214 OFFICE O/P EST MOD 30 MIN: CPT | Performed by: STUDENT IN AN ORGANIZED HEALTH CARE EDUCATION/TRAINING PROGRAM

## 2024-05-14 RX ORDER — DIPHENHYDRAMINE HYDROCHLORIDE 50 MG/ML
50 INJECTION INTRAMUSCULAR; INTRAVENOUS
Status: CANCELLED
Start: 2024-07-19

## 2024-05-14 RX ORDER — ACETAMINOPHEN 325 MG/1
650 TABLET ORAL ONCE
Status: CANCELLED
Start: 2024-07-19 | End: 2024-07-19

## 2024-05-14 RX ORDER — HEPARIN SODIUM,PORCINE 10 UNIT/ML
5 VIAL (ML) INTRAVENOUS
Status: CANCELLED | OUTPATIENT
Start: 2024-07-19

## 2024-05-14 RX ORDER — MEPERIDINE HYDROCHLORIDE 25 MG/ML
25 INJECTION INTRAMUSCULAR; INTRAVENOUS; SUBCUTANEOUS EVERY 30 MIN PRN
Status: CANCELLED | OUTPATIENT
Start: 2024-07-19

## 2024-05-14 RX ORDER — ALBUTEROL SULFATE 90 UG/1
1-2 AEROSOL, METERED RESPIRATORY (INHALATION)
Status: CANCELLED
Start: 2024-07-19

## 2024-05-14 RX ORDER — DIPHENHYDRAMINE HCL 25 MG
50 CAPSULE ORAL ONCE
Status: CANCELLED
Start: 2024-07-19 | End: 2024-09-06

## 2024-05-14 RX ORDER — HEPARIN SODIUM (PORCINE) LOCK FLUSH IV SOLN 100 UNIT/ML 100 UNIT/ML
5 SOLUTION INTRAVENOUS
Status: CANCELLED | OUTPATIENT
Start: 2024-07-19

## 2024-05-14 RX ORDER — DIPHENHYDRAMINE HCL 25 MG
50 CAPSULE ORAL ONCE
Status: CANCELLED
Start: 2024-07-19 | End: 2024-07-19

## 2024-05-14 RX ORDER — EPINEPHRINE 1 MG/ML
0.3 INJECTION, SOLUTION INTRAMUSCULAR; SUBCUTANEOUS EVERY 5 MIN PRN
Status: CANCELLED | OUTPATIENT
Start: 2024-07-19

## 2024-05-14 RX ORDER — ACETAMINOPHEN 325 MG/1
650 TABLET ORAL ONCE
Status: CANCELLED
Start: 2024-07-19 | End: 2024-09-06

## 2024-05-14 RX ORDER — METHYLPREDNISOLONE SODIUM SUCCINATE 125 MG/2ML
125 INJECTION, POWDER, LYOPHILIZED, FOR SOLUTION INTRAMUSCULAR; INTRAVENOUS
Status: CANCELLED
Start: 2024-07-19

## 2024-05-14 RX ORDER — DEXAMETHASONE 4 MG/1
12 TABLET ORAL ONCE
Status: CANCELLED
Start: 2024-07-19 | End: 2024-09-06

## 2024-05-14 RX ORDER — DEXAMETHASONE 4 MG/1
12 TABLET ORAL ONCE
Status: CANCELLED
Start: 2024-07-19 | End: 2024-07-19

## 2024-05-14 RX ORDER — ALBUTEROL SULFATE 0.83 MG/ML
2.5 SOLUTION RESPIRATORY (INHALATION)
Status: CANCELLED | OUTPATIENT
Start: 2024-07-19

## 2024-05-14 RX ORDER — NALOXONE HYDROCHLORIDE 0.4 MG/ML
0.2 INJECTION, SOLUTION INTRAMUSCULAR; INTRAVENOUS; SUBCUTANEOUS
Status: CANCELLED | OUTPATIENT
Start: 2024-07-19

## 2024-05-14 RX ADMIN — HEPATITIS A VACCINE 1440 UNITS: 1440 INJECTION, SUSPENSION INTRAMUSCULAR at 16:50

## 2024-05-14 RX ADMIN — MEASLES, MUMPS, AND RUBELLA VIRUS VACCINE LIVE 0.5 ML: 1000; 12500; 1000 INJECTION, POWDER, LYOPHILIZED, FOR SUSPENSION SUBCUTANEOUS at 16:54

## 2024-05-14 ASSESSMENT — PAIN SCALES - GENERAL: PAINLEVEL: NO PAIN (0)

## 2024-05-14 NOTE — PROGRESS NOTES
[facility]  Date of visit: 05/14/2024        Oncologic History  - 4/30/2021 M spike of 34.8 in urine.M spike in blood 0.2; IgG 702 with depressed IgA and IgM. Beta 2 microglobulin 2.7. Lambda  with K/L ratio of 0.01. WBC 11.1 with absolute eos of 1.0. hgb, plt, Cr (1.1), and albumin WNL.    -4/30/2021 CT abd/pelvis showed sclerotic marrow changes with numerous lytic appearing lesions throughout the imaged portions of the spine, pelvis and ribs.      -PET scan 5/11/2021 Numerous osteolytic lesions which predominantly demonstrate uptake below liver background levels. There is one intraosseous lesion in the left lateral 9th rib that demonstrates uptake above background, possibly due to nondisplaced fracture. Adjacent to this lesion is mild hypermetabolism to the left pleura, with new small left pleural effusion, suspicious for malignant effusion versus posttraumatic effusion. Pleural uptake may represent extramedullary myeloma extending along the intercostal space versus inflammation.    - BM bx 5/17/2021 with flow showing 4.0% plasma cells which express CD19 (dim), CD38 (bright), CD45 (dim), , and monotypic cytoplasmic lambda immunoglobulin light chains but lack CD20 and CD56.  Hypercellular bone marrow for age (approximately 75% cellularity) with adequate trilineage hematopoiesis. Plasma cell infiltrate: Interstitial, lambda monotypic and representing approximately 60% the bone marrow cellularity, by  immunohistochemical stain. Cytpgenetics with 2 related hypodiploid clones. One with loss of Y, monosomy 13 and 14 (5%) and one with translocation of 8p and 14, derivative 16 with long arm replaced by extra copy 1q,monosomy 21. FISH showed gain of 1q, loss of 13 and 14, IGH-MYC fusion t(8:14)    - Started Miracle-RVd 21 day with velcade on days 1,8,15.     -Cycle 2 Miracle-RVd. Dose reduce dex to 80 mg d/t fatigue and stomach upset on dex days. Also having cough with basilar  streaking on CXR    - 8/31/2021 BM bx -rare suspicious plasma cells in touch imprint. No increase in plasma cells by morph.    -8/31/2021 PET/CT Diffuse osteolytic lesions throughout the axial and appendicular skeleton. Increased sclerosis since prior exam 5/11/2021 without increased metabolism or size.  Hypermetabolic pretracheal lymph node as well as hypermetabolic level 2 lymph nodes within the right neck. These lymph nodes are likely reactive from autoimmune activation via medical therapy. Hypermetabolic subcentimeter nodules within the thyroid gland    - 11/11/2021 Autologous BMT    - 2/21/22 started maintenance with Revlimid 10 mg and daratumumab monthly; did Revlimid alone for C1 due to low IgG levels, added daratumumab starting C2.  Initially did Revlimid daily, decreased to 21/28 days in Nov 2023 to help minimize side effects    Interval Hx:   Logan is feeling good. No side effects from maintenance therapy. Most recent clonoseq was MRD negative. [place] No bone pains    Physical Exam:  /79   Pulse 60   Temp 98  F (36.7  C)   Resp 16   Wt 70.3 kg (155 lb)   SpO2 98%   BMI 26.19 kg/m      Wt Readings from Last 5 Encounters:   05/14/24 70.3 kg (155 lb)   04/26/24 69 kg (152 lb 3.2 oz)   03/29/24 70.8 kg (156 lb 1.6 oz)   03/01/24 70.3 kg (154 lb 14.4 oz)   02/28/24 69.4 kg (153 lb)       Constitutional: NAD  Eyes: no scleral icterus  ENT: no oral lesions  Pulm: CTAB  CV: RRR, no m/r/g  GI: bowel sounds present, soft and nontender to palpation  MSK: No edema in the extremities  Neuro: alert, conversant, normal gait  Psych: appropriate mood and affect          Assessment and Plan  Multiple myeloma with high risk cytogenetics  - Vidya-RVD with VGPR, then underwent autologous transplant 11/11/21.   - Given his high risk cytogenetics he started vidya + revlimid maintenance therapy 2/21/22.  - Completed 2 years of zometa in Sept 2023. Continue Vit D.   -  Revlimid was decreased to 21/28 days in Nov 2023 to try to minimize side effects (dysgeusia, flu-like symptoms on days following daratumumab)  - clonoseq was MRD-. Maintenance strategies discussed including holding revlimid. He feels well and would like to continue as is for now.     Slight normocytic anemia  - likely due to maintenance therapy      Barbi Vazquez MD PhD

## 2024-05-14 NOTE — LETTER
5/14/2024         RE: June Ronquillo  [address]  [facility] [id number]        Dear Colleague,    Thank you for referring your patient, June Ronquillo, to the [facility]. Please see a copy of my visit note below.    [facility]  Date of visit: 05/14/2024        Oncologic History  - 4/30/2021 M spike of 34.8 in urine.M spike in blood 0.2; IgG 702 with depressed IgA and IgM. Beta 2 microglobulin 2.7. Lambda  with K/L ratio of 0.01. WBC 11.1 with absolute eos of 1.0. hgb, plt, Cr (1.1), and albumin WNL.    -4/30/2021 CT abd/pelvis showed sclerotic marrow changes with numerous lytic appearing lesions throughout the imaged portions of the spine, pelvis and ribs.      -PET scan 5/11/2021 Numerous osteolytic lesions which predominantly demonstrate uptake below liver background levels. There is one intraosseous lesion in the left lateral 9th rib that demonstrates uptake above background, possibly due to nondisplaced fracture. Adjacent to this lesion is mild hypermetabolism to the left pleura, with new small left pleural effusion, suspicious for malignant effusion versus posttraumatic effusion. Pleural uptake may represent extramedullary myeloma extending along the intercostal space versus inflammation.    - BM bx 5/17/2021 with flow showing 4.0% plasma cells which express CD19 (dim), CD38 (bright), CD45 (dim), , and monotypic cytoplasmic lambda immunoglobulin light chains but lack CD20 and CD56.  Hypercellular bone marrow for age (approximately 75% cellularity) with adequate trilineage hematopoiesis. Plasma cell infiltrate: Interstitial, lambda monotypic and representing approximately 60% the bone marrow cellularity, by  immunohistochemical stain. Cytpgenetics with 2 related hypodiploid clones. One with loss of Y, monosomy 13 and 14 (5%) and one with translocation of 8p and 14, derivative 16 with long arm replaced by  extra copy 1q,monosomy 21. FISH showed gain of 1q, loss of 13 and 14, IGH-MYC fusion t(8:14)    - Started Miracle-RVd 21 day with velcade on days 1,8,15.     -Cycle 2 Miracle-RVd. Dose reduce dex to 80 mg d/t fatigue and stomach upset on dex days. Also having cough with basilar streaking on CXR    - 8/31/2021 BM bx -rare suspicious plasma cells in touch imprint. No increase in plasma cells by morph.    -8/31/2021 PET/CT Diffuse osteolytic lesions throughout the axial and appendicular skeleton. Increased sclerosis since prior exam 5/11/2021 without increased metabolism or size.  Hypermetabolic pretracheal lymph node as well as hypermetabolic level 2 lymph nodes within the right neck. These lymph nodes are likely reactive from autoimmune activation via medical therapy. Hypermetabolic subcentimeter nodules within the thyroid gland    - 11/11/2021 Autologous BMT    - 2/21/22 started maintenance with Revlimid 10 mg and daratumumab monthly; did Revlimid alone for C1 due to low IgG levels, added daratumumab starting C2.  Initially did Revlimid daily, decreased to 21/28 days in Nov 2023 to help minimize side effects    Interval Hx:   Logan is feeling good. No side effects from maintenance therapy. Most recent clonoseq was MRD negative. [place] No bone pains    Physical Exam:  /79   Pulse 60   Temp 98  F (36.7  C)   Resp 16   Wt 70.3 kg (155 lb)   SpO2 98%   BMI 26.19 kg/m      Wt Readings from Last 5 Encounters:   05/14/24 70.3 kg (155 lb)   04/26/24 69 kg (152 lb 3.2 oz)   03/29/24 70.8 kg (156 lb 1.6 oz)   03/01/24 70.3 kg (154 lb 14.4 oz)   02/28/24 69.4 kg (153 lb)       Constitutional: NAD  Eyes: no scleral icterus  ENT: no oral lesions  Pulm: CTAB  CV: RRR, no m/r/g  GI: bowel sounds present, soft and nontender to palpation  MSK: No edema in the extremities  Neuro: alert, conversant, normal gait  Psych: appropriate mood and affect          Assessment and  Plan  Multiple myeloma with high risk cytogenetics  - Miracle-RVD with VGPR, then underwent autologous transplant 11/11/21.   - Given his high risk cytogenetics he started miracle + revlimid maintenance therapy 2/21/22.  - Completed 2 years of zometa in Sept 2023. Continue Vit D.   - Revlimid was decreased to 21/28 days in Nov 2023 to try to minimize side effects (dysgeusia, flu-like symptoms on days following daratumumab)  - clonoseq was MRD-. Maintenance strategies discussed including holding revlimid. He feels well and would like to continue as is for now.     Slight normocytic anemia  - likely due to maintenance therapy      Barbi Vazquez MD PhD

## 2024-05-14 NOTE — NURSING NOTE
Chief Complaint   Patient presents with    Oncology Clinic Visit     Multiple myeloma    Blood Draw     Labs collected from venipuncture by RN. Vitals taken. Checked in for appointment(s).       Labs collected from venipuncture by RN. Vitals taken. Checked in for appointment(s).     Niurka Leon RN

## 2024-05-14 NOTE — PROGRESS NOTES
[facility]: Cancer Care                                                                                          I met with Logan following his visit with Dr. Barbi Vazquez.     We reviewed the plan for clonoseq testing every 3 months. This will be drawn in June.    I let him know if he had any questions or concerns with this testing including billing or insurance authorization to contact me and I can assist with getting him to the persons who can manage these concerns.    Logan had some vaccinations today.  He was informed that the HEP A vaccine will need to be repeated in12 weeks     Signature:  Christina Zarate RN

## 2024-05-14 NOTE — NURSING NOTE
"Oncology Rooming Note    May 14, 2024 4:05 PM   June Roqnuillo is a 53 year old male who presents for:    Chief Complaint   Patient presents with    Oncology Clinic Visit     Multiple myeloma    Blood Draw     Labs collected from venipuncture by RN. Vitals taken. Checked in for appointment(s).       Initial Vitals: /79   Pulse 60   Temp 98  F (36.7  C)   Resp 16   Wt 70.3 kg (155 lb)   SpO2 98%   BMI 26.19 kg/m   Estimated body mass index is 26.19 kg/m  as calculated from the following:    Height as of 2/28/24: 1.638 m (5' 4.5\").    Weight as of this encounter: 70.3 kg (155 lb). Body surface area is 1.79 meters squared.  No Pain (0) Comment: Data Unavailable   No LMP for male patient.  Allergies reviewed: Yes  Medications reviewed: Yes    Medications: Medication refills not needed today.  Pharmacy name entered into [email or web address]:    [facility] [place], [place] - [address]  [facility] [place], [place] - [address] [place] [place] - [phone]  [facility] [place], [place] - [address] [facility]  [facility] - [place], [place] - [address]    Frailty Screening:   Is the patient here for a new oncology consult visit in cancer care? 2. No      Clinical concerns: none      Camila Humphries, EMT  5/14/2024            " Dr. Mitesh Herring called back and informed of pt ekg and Dr. Cliff Godinez recommendation to do a VERONIQUE. She stated wants pt to have cardiac clearance prior to surgery.  made appointment for September 1st 10:15AM at [address] [place] [address] office. Patient called and given cardiology clearance appointment information and [facility] cardiology number.

## 2024-05-14 NOTE — NURSING NOTE
hepatitis A virus (PF) vaccine (HAVRIX (PF)) injection 1,440 UnitsDose: 1,440 Units  :  Intramuscular  :  ONCE  :    1650 $Given 1,440 Units     Measles, Mumps & Rubella Vac (MMR) injection 0.5 mLDose: 0.5 mL  :  Subcutaneous  :  ONCE  1654 $Given 0.5 mL    The above vaccines given per Dr. Vazquez. 1 IM vaccine of Hepatitis A given via Right Deltoid. 1 IM vaccine of MMR vaccine given via right deltoid-at least 2 inches away from previous injection. Confirmed with pharmacist Wilber that MMR can be given IM or subcutaneous. Patient tolerated vaccines well without issues.

## 2024-05-15 LAB
ALBUMIN SERPL ELPH-MCNC: 3.9 G/DL (ref 3.7–5.1)
ALPHA1 GLOB SERPL ELPH-MCNC: 0.3 G/DL (ref 0.2–0.4)
ALPHA2 GLOB SERPL ELPH-MCNC: 0.6 G/DL (ref 0.5–0.9)
B-GLOBULIN SERPL ELPH-MCNC: 0.8 G/DL (ref 0.6–1)
GAMMA GLOB SERPL ELPH-MCNC: 0.4 G/DL (ref 0.7–1.6)
IGA SERPL-MCNC: 47 MG/DL (ref 84–499)
IGG SERPL-MCNC: 428 MG/DL (ref 610–1616)
IGM SERPL-MCNC: 20 MG/DL (ref 35–242)
KAPPA LC FREE SER-MCNC: 0.8 MG/DL (ref 0.33–1.94)
KAPPA LC FREE/LAMBDA FREE SER NEPH: 1.48 {RATIO} (ref 0.26–1.65)
LAMBDA LC FREE SERPL-MCNC: 0.54 MG/DL (ref 0.57–2.63)
M PROTEIN SERPL ELPH-MCNC: 0.1 G/DL
PATH REPORT.COMMENTS IMP SPEC: ABNORMAL
PATH REPORT.COMMENTS IMP SPEC: NORMAL
PROT PATTERN SERPL ELPH-IMP: ABNORMAL
PROT PATTERN SERPL IFE-IMP: NORMAL
TOTAL PROTEIN SERUM FOR ELP: 6 G/DL (ref 6.4–8.3)

## 2024-05-24 ENCOUNTER — APPOINTMENT (OUTPATIENT)
Dept: LAB | Facility: CLINIC | Age: 54
End: 2024-05-24
Attending: STUDENT IN AN ORGANIZED HEALTH CARE EDUCATION/TRAINING PROGRAM
Payer: COMMERCIAL

## 2024-05-24 ENCOUNTER — INFUSION THERAPY VISIT (OUTPATIENT)
Dept: ONCOLOGY | Facility: CLINIC | Age: 54
End: 2024-05-24
Attending: STUDENT IN AN ORGANIZED HEALTH CARE EDUCATION/TRAINING PROGRAM
Payer: COMMERCIAL

## 2024-05-24 VITALS
HEART RATE: 88 BPM | WEIGHT: 156.6 LBS | OXYGEN SATURATION: 100 % | TEMPERATURE: 97.8 F | BODY MASS INDEX: 26.47 KG/M2 | SYSTOLIC BLOOD PRESSURE: 136 MMHG | RESPIRATION RATE: 16 BRPM | DIASTOLIC BLOOD PRESSURE: 86 MMHG

## 2024-05-24 DIAGNOSIS — C90.00 MULTIPLE MYELOMA NOT HAVING ACHIEVED REMISSION (H): Primary | ICD-10-CM

## 2024-05-24 DIAGNOSIS — C90.00 MULTIPLE MYELOMA NOT HAVING ACHIEVED REMISSION (H): ICD-10-CM

## 2024-05-24 LAB
ALBUMIN SERPL BCG-MCNC: 4.2 G/DL (ref 3.5–5.2)
ALP SERPL-CCNC: 59 U/L (ref 40–150)
ALT SERPL W P-5'-P-CCNC: 14 U/L (ref 0–70)
ANION GAP SERPL CALCULATED.3IONS-SCNC: 10 MMOL/L (ref 7–15)
AST SERPL W P-5'-P-CCNC: 24 U/L (ref 0–45)
BASOPHILS # BLD AUTO: 0 10E3/UL (ref 0–0.2)
BASOPHILS NFR BLD AUTO: 1 %
BILIRUB SERPL-MCNC: 0.4 MG/DL
BUN SERPL-MCNC: 18.8 MG/DL (ref 6–20)
CALCIUM SERPL-MCNC: 8.9 MG/DL (ref 8.6–10)
CHLORIDE SERPL-SCNC: 104 MMOL/L (ref 98–107)
CREAT SERPL-MCNC: 1.27 MG/DL (ref 0.67–1.17)
DEPRECATED HCO3 PLAS-SCNC: 23 MMOL/L (ref 22–29)
EGFRCR SERPLBLD CKD-EPI 2021: 67 ML/MIN/1.73M2
EOSINOPHIL # BLD AUTO: 0.1 10E3/UL (ref 0–0.7)
EOSINOPHIL NFR BLD AUTO: 2 %
ERYTHROCYTE [DISTWIDTH] IN BLOOD BY AUTOMATED COUNT: 17.1 % (ref 10–15)
GLUCOSE SERPL-MCNC: 121 MG/DL (ref 70–99)
HCT VFR BLD AUTO: 36 % (ref 40–53)
HGB BLD-MCNC: 11.6 G/DL (ref 13.3–17.7)
IMM GRANULOCYTES # BLD: 0 10E3/UL
IMM GRANULOCYTES NFR BLD: 0 %
LYMPHOCYTES # BLD AUTO: 1.6 10E3/UL (ref 0.8–5.3)
LYMPHOCYTES NFR BLD AUTO: 32 %
MCH RBC QN AUTO: 27.8 PG (ref 26.5–33)
MCHC RBC AUTO-ENTMCNC: 32.2 G/DL (ref 31.5–36.5)
MCV RBC AUTO: 86 FL (ref 78–100)
MONOCYTES # BLD AUTO: 0.8 10E3/UL (ref 0–1.3)
MONOCYTES NFR BLD AUTO: 17 %
NEUTROPHILS # BLD AUTO: 2.4 10E3/UL (ref 1.6–8.3)
NEUTROPHILS NFR BLD AUTO: 48 %
NRBC # BLD AUTO: 0 10E3/UL
NRBC BLD AUTO-RTO: 0 /100
PLATELET # BLD AUTO: 420 10E3/UL (ref 150–450)
POTASSIUM SERPL-SCNC: 3.9 MMOL/L (ref 3.4–5.3)
PROT SERPL-MCNC: 6 G/DL (ref 6.4–8.3)
RBC # BLD AUTO: 4.17 10E6/UL (ref 4.4–5.9)
SODIUM SERPL-SCNC: 137 MMOL/L (ref 135–145)
WBC # BLD AUTO: 4.9 10E3/UL (ref 4–11)

## 2024-05-24 PROCEDURE — 82784 ASSAY IGA/IGD/IGG/IGM EACH: CPT

## 2024-05-24 PROCEDURE — 85004 AUTOMATED DIFF WBC COUNT: CPT

## 2024-05-24 PROCEDURE — 84155 ASSAY OF PROTEIN SERUM: CPT

## 2024-05-24 PROCEDURE — 250N000011 HC RX IP 250 OP 636: Mod: JZ | Performed by: STUDENT IN AN ORGANIZED HEALTH CARE EDUCATION/TRAINING PROGRAM

## 2024-05-24 PROCEDURE — 36415 COLL VENOUS BLD VENIPUNCTURE: CPT

## 2024-05-24 PROCEDURE — 82040 ASSAY OF SERUM ALBUMIN: CPT

## 2024-05-24 PROCEDURE — 96401 CHEMO ANTI-NEOPL SQ/IM: CPT

## 2024-05-24 PROCEDURE — 84165 PROTEIN E-PHORESIS SERUM: CPT | Mod: TC | Performed by: PATHOLOGY

## 2024-05-24 PROCEDURE — 83521 IG LIGHT CHAINS FREE EACH: CPT

## 2024-05-24 PROCEDURE — 84165 PROTEIN E-PHORESIS SERUM: CPT | Mod: 26 | Performed by: PATHOLOGY

## 2024-05-24 PROCEDURE — 86334 IMMUNOFIX E-PHORESIS SERUM: CPT | Performed by: PATHOLOGY

## 2024-05-24 PROCEDURE — 86334 IMMUNOFIX E-PHORESIS SERUM: CPT | Mod: 26 | Performed by: PATHOLOGY

## 2024-05-24 RX ADMIN — DARATUMUMAB AND HYALURONIDASE-FIHJ (HUMAN RECOMBINANT) 1800 MG: 1800; 30000 INJECTION SUBCUTANEOUS at 16:00

## 2024-05-24 ASSESSMENT — PAIN SCALES - GENERAL: PAINLEVEL: NO PAIN (0)

## 2024-05-24 NOTE — PROGRESS NOTES
Infusion Nursing Note:  June Ronquillo presents today for Cycle 31 Day 1 Darzalex Faspro.    Patient seen by provider today: No   present during visit today: Not Applicable.    Note: Patient arrives feeling well. He states he is eating and drinking well, has mild fatigue, neuropathy stable, and denies fever, chills, N/V/D, urinary issues, sore throat, rash, or other acute concerns.    Intravenous Access:  Venipuncture in lab.    Treatment Conditions:  Lab Results   Component Value Date    HGB 11.6 (L) 05/24/2024    WBC 4.9 05/24/2024    ANEU 4.7 03/13/2023    ANEUTAUTO 2.4 05/24/2024     05/24/2024        Lab Results   Component Value Date     05/24/2024    POTASSIUM 3.9 05/24/2024    MAG 2.6 (H) 04/28/2023    CR 1.27 (H) 05/24/2024    HARDEEP 8.9 05/24/2024    BILITOTAL 0.4 05/24/2024    ALBUMIN 4.2 05/24/2024    ALT 14 05/24/2024    AST 24 05/24/2024       Results reviewed, labs MET treatment parameters, ok to proceed with treatment.    Patient refused IV fluids and will push fluids over the weekend. Notified Dr. Vazquez via [employer] and asked if he should get repeat labs done prior to next visit, and if so, to have RNCC follow-up with him.      Post Infusion Assessment:  Patient tolerated Darzalex Faspro injection into RIGHT lower abdomen without incident.       Discharge Plan:   Patient will send message to [facility] to refill Vit B with C, postassium and OsCal next week.  Discharge instructions reviewed with: Patient.  Patient and/or family verbalized understanding of discharge instructions and all questions answered.  AVS to patient via [employer].  Patient will return 6/21 for next appointment.   Patient discharged in stable condition accompanied by: self.  Departure Mode: Ambulatory.      Mary Chin RN

## 2024-05-24 NOTE — NURSING NOTE
Chief Complaint   Patient presents with    Labs Only     Venipuncture, vitals checked     Alyce Gibbs RN on 5/24/2024 at 3:29 PM

## 2024-05-25 LAB — TOTAL PROTEIN SERUM FOR ELP: 5.9 G/DL (ref 6.4–8.3)

## 2024-05-28 LAB
IGA SERPL-MCNC: 43 MG/DL (ref 84–499)
IGG SERPL-MCNC: 428 MG/DL (ref 610–1616)
IGM SERPL-MCNC: 15 MG/DL (ref 35–242)
KAPPA LC FREE SER-MCNC: 0.55 MG/DL (ref 0.33–1.94)
KAPPA LC FREE/LAMBDA FREE SER NEPH: 1.25 {RATIO} (ref 0.26–1.65)
LAMBDA LC FREE SERPL-MCNC: 0.44 MG/DL (ref 0.57–2.63)

## 2024-05-28 NOTE — TELEPHONE ENCOUNTER
Medication:Potassium, Calcium, Vitamin C  Last prescribing provider:Laura Ballesteros  Last clinic visit date: 5/14/2024 Dr. Vazquez  Recommendations for requested medication:supplements  Any other pertinent information:routed to Dr. Vazquez for review

## 2024-05-29 LAB
ALBUMIN SERPL ELPH-MCNC: 4 G/DL (ref 3.7–5.1)
ALPHA1 GLOB SERPL ELPH-MCNC: 0.3 G/DL (ref 0.2–0.4)
ALPHA2 GLOB SERPL ELPH-MCNC: 0.5 G/DL (ref 0.5–0.9)
B-GLOBULIN SERPL ELPH-MCNC: 0.7 G/DL (ref 0.6–1)
GAMMA GLOB SERPL ELPH-MCNC: 0.4 G/DL (ref 0.7–1.6)
M PROTEIN SERPL ELPH-MCNC: 0.1 G/DL
PATH REPORT.COMMENTS IMP SPEC: ABNORMAL
PATH REPORT.COMMENTS IMP SPEC: NORMAL
PROT PATTERN SERPL ELPH-IMP: ABNORMAL
PROT PATTERN SERPL IFE-IMP: NORMAL

## 2024-05-29 RX ORDER — MULTIVITAMIN WITH IRON
1 TABLET ORAL DAILY
Qty: 90 TABLET | Refills: 3 | Status: SHIPPED | OUTPATIENT
Start: 2024-05-29

## 2024-05-29 RX ORDER — POTASSIUM CHLORIDE 750 MG/1
20 TABLET, EXTENDED RELEASE ORAL DAILY
Qty: 60 TABLET | Refills: 3 | Status: SHIPPED | OUTPATIENT
Start: 2024-05-29

## 2024-05-31 DIAGNOSIS — C90.00 MULTIPLE MYELOMA NOT HAVING ACHIEVED REMISSION (H): Primary | ICD-10-CM

## 2024-05-31 RX ORDER — LENALIDOMIDE 10 MG/1
10 CAPSULE ORAL DAILY
Qty: 21 CAPSULE | Refills: 0 | Status: SHIPPED | OUTPATIENT
Start: 2024-05-31 | End: 2024-09-27

## 2024-06-04 ENCOUNTER — TELEPHONE (OUTPATIENT)
Dept: ONCOLOGY | Facility: CLINIC | Age: 54
End: 2024-06-04
Payer: COMMERCIAL

## 2024-06-04 NOTE — TELEPHONE ENCOUNTER
Oral Chemotherapy Monitoring Program   Medication: Revlimid  Rx: 10mg PO daily on days 1 through 21 of 28 day cycle   Auth #: [id number]   Risk Category: Adult Male  Routine survey questions reviewed.   Rx to be Escribed to [facility]    Yu Goldberg  [facility]  Oncology Pharmacy Liaison   [email or web address]  [phone] (phone)  [phone] (fax)

## 2024-06-19 ENCOUNTER — PATIENT OUTREACH (OUTPATIENT)
Dept: ONCOLOGY | Facility: CLINIC | Age: 54
End: 2024-06-19
Payer: COMMERCIAL

## 2024-06-19 NOTE — PROGRESS NOTES
[facility]: Cancer Care                                                                                          Call placed to Logan to follow up on his request to move his appointments from Friday 6/21/2024.  He was informed that we had an appointment available on 6/25/2024 arriving at 245pm for labs, followed by DOROTA visit and infusion will be scheduled.  He was agreeable to this time.  He was also informed that he is due for Clonoseq testing at this time.  He has no other needs or questions at this time.    Signature:  Christina Zarate RN

## 2024-06-25 ENCOUNTER — APPOINTMENT (OUTPATIENT)
Dept: LAB | Facility: CLINIC | Age: 54
End: 2024-06-25
Attending: STUDENT IN AN ORGANIZED HEALTH CARE EDUCATION/TRAINING PROGRAM
Payer: COMMERCIAL

## 2024-06-25 ENCOUNTER — INFUSION THERAPY VISIT (OUTPATIENT)
Dept: TRANSPLANT | Facility: CLINIC | Age: 54
End: 2024-06-25
Attending: STUDENT IN AN ORGANIZED HEALTH CARE EDUCATION/TRAINING PROGRAM
Payer: COMMERCIAL

## 2024-06-25 ENCOUNTER — ONCOLOGY VISIT (OUTPATIENT)
Dept: ONCOLOGY | Facility: CLINIC | Age: 54
End: 2024-06-25
Attending: STUDENT IN AN ORGANIZED HEALTH CARE EDUCATION/TRAINING PROGRAM
Payer: COMMERCIAL

## 2024-06-25 ENCOUNTER — PATIENT OUTREACH (OUTPATIENT)
Dept: ONCOLOGY | Facility: CLINIC | Age: 54
End: 2024-06-25

## 2024-06-25 VITALS
DIASTOLIC BLOOD PRESSURE: 84 MMHG | OXYGEN SATURATION: 100 % | HEART RATE: 83 BPM | SYSTOLIC BLOOD PRESSURE: 159 MMHG | TEMPERATURE: 98.1 F

## 2024-06-25 VITALS
HEART RATE: 84 BPM | TEMPERATURE: 98.5 F | OXYGEN SATURATION: 100 % | WEIGHT: 157.8 LBS | DIASTOLIC BLOOD PRESSURE: 84 MMHG | RESPIRATION RATE: 18 BRPM | BODY MASS INDEX: 26.67 KG/M2 | SYSTOLIC BLOOD PRESSURE: 147 MMHG

## 2024-06-25 DIAGNOSIS — C90.01 MULTIPLE MYELOMA IN REMISSION (H): Primary | ICD-10-CM

## 2024-06-25 DIAGNOSIS — R09.81 NASAL CONGESTION: ICD-10-CM

## 2024-06-25 DIAGNOSIS — C90.00 MULTIPLE MYELOMA NOT HAVING ACHIEVED REMISSION (H): Primary | ICD-10-CM

## 2024-06-25 DIAGNOSIS — D64.9 NORMOCYTIC ANEMIA: ICD-10-CM

## 2024-06-25 LAB — HOLD SPECIMEN: NORMAL

## 2024-06-25 PROCEDURE — 250N000011 HC RX IP 250 OP 636: Mod: JZ | Performed by: STUDENT IN AN ORGANIZED HEALTH CARE EDUCATION/TRAINING PROGRAM

## 2024-06-25 PROCEDURE — 99215 OFFICE O/P EST HI 40 MIN: CPT | Performed by: NURSE PRACTITIONER

## 2024-06-25 PROCEDURE — 96401 CHEMO ANTI-NEOPL SQ/IM: CPT

## 2024-06-25 PROCEDURE — 99213 OFFICE O/P EST LOW 20 MIN: CPT | Mod: 25 | Performed by: NURSE PRACTITIONER

## 2024-06-25 PROCEDURE — 36415 COLL VENOUS BLD VENIPUNCTURE: CPT

## 2024-06-25 RX ADMIN — DARATUMUMAB AND HYALURONIDASE-FIHJ (HUMAN RECOMBINANT) 1800 MG: 1800; 30000 INJECTION SUBCUTANEOUS at 16:20

## 2024-06-25 ASSESSMENT — PAIN SCALES - GENERAL: PAINLEVEL: SEVERE PAIN (6)

## 2024-06-25 NOTE — NURSING NOTE
Chief Complaint   Patient presents with    Blood Draw     Labs drawn with PIV start by RN. Pt tolerated well. Vitals taken. Patient checked into next appointment.       Ivana Mena RN

## 2024-06-25 NOTE — NURSING NOTE
"Oncology Rooming Note    June 25, 2024 3:14 PM   June Ronquillo is a 54 year old male who presents for:    Chief Complaint   Patient presents with    Blood Draw     Labs drawn with PIV start by RN. Pt tolerated well. Vitals taken. Patient checked into next appointment.      Oncology Clinic Visit     Multiple Myeloma      Initial Vitals: BP (!) 147/84   Pulse 84   Temp 98.5  F (36.9  C) (Oral)   Resp 18   Wt 71.6 kg (157 lb 12.8 oz)   SpO2 100%   BMI 26.67 kg/m   Estimated body mass index is 26.67 kg/m  as calculated from the following:    Height as of 2/28/24: 1.638 m (5' 4.5\").    Weight as of this encounter: 71.6 kg (157 lb 12.8 oz). Body surface area is 1.81 meters squared.  Severe Pain (6) Comment: Data Unavailable   No LMP for male patient.  Allergies reviewed: Yes  Medications reviewed: Yes    Medications: MEDICATION REFILLS NEEDED TODAY. Provider was notified. Via message column  Pharmacy name entered into [employer]:    [facility] [place], [place] - [address]  [facility] - [place], [place] - [address] [facility] [place], [place] - [phone]  [facility] [place], [place] - [address] [facility]  [facility] - [place], [place] - [address]    Frailty Screening:   Is the patient here for a new oncology consult visit in cancer care? 2. No      Clinical concerns: Needs vitamin c with b refilled- headache above right eye since Friday      Gayatri Whiting              "

## 2024-06-25 NOTE — PROGRESS NOTES
Infusion Nursing Note:  Kurtismaria dolores KINDRA Ronquillo presents today for C32 D1 daratumumab inj.    Patient seen by provider today: Yes: henny Bourne   present during visit today: Not Applicable.    Note: Labs monitored, elevated b/p. Daratumumab administered in right abd over 5 mins. Pt tolerated inj.       Intravenous Access:  No Intravenous access/labs at this visit.    Treatment Conditions:  Lab Results   Component Value Date    HGB 11.5 (L) 06/25/2024    WBC 7.7 06/25/2024    ANEU 4.7 03/13/2023    ANEUTAUTO 5.4 06/25/2024     (H) 06/25/2024        Lab Results   Component Value Date     06/25/2024    POTASSIUM 3.9 06/25/2024    MAG 2.6 (H) 04/28/2023    CR 1.09 06/25/2024    HARDEEP 9.0 06/25/2024    BILITOTAL 0.3 06/25/2024    ALBUMIN 4.1 06/25/2024    ALT 14 06/25/2024    AST 26 06/25/2024       Results reviewed, labs MET treatment parameters, ok to proceed with treatment.      Post Infusion Assessment:  Patient tolerated injection without incident.  Access discontinued per protocol.       Discharge Plan:   Patient discharged in stable condition accompanied by: self.  Departure Mode: Ambulatory.      Joshua Davila RN

## 2024-06-25 NOTE — LETTER
6/25/2024      June Ronquillo  [address]  [facility] [address]      Dear Colleague,    Thank you for referring your patient, June Ronquillo, to the [facility]. Please see a copy of my visit note below.    [facility]  Date of visit: 06/25/2024        Oncologic History  - 4/30/2021 M spike of 34.8 in urine.M spike in blood 0.2; IgG 702 with depressed IgA and IgM. Beta 2 microglobulin 2.7. Lambda  with K/L ratio of 0.01. WBC 11.1 with absolute eos of 1.0. hgb, plt, Cr (1.1), and albumin WNL.    -4/30/2021 CT abd/pelvis showed sclerotic marrow changes with numerous lytic appearing lesions throughout the imaged portions of the spine, pelvis and ribs.      -PET scan 5/11/2021 Numerous osteolytic lesions which predominantly demonstrate uptake below liver background levels. There is one intraosseous lesion in the left lateral 9th rib that demonstrates uptake above background, possibly due to nondisplaced fracture. Adjacent to this lesion is mild hypermetabolism to the left pleura, with new small left pleural effusion, suspicious for malignant effusion versus posttraumatic effusion. Pleural uptake may represent extramedullary myeloma extending along the intercostal space versus inflammation.    - BM bx 5/17/2021 with flow showing 4.0% plasma cells which express CD19 (dim), CD38 (bright), CD45 (dim), , and monotypic cytoplasmic lambda immunoglobulin light chains but lack CD20 and CD56.  Hypercellular bone marrow for age (approximately 75% cellularity) with adequate trilineage hematopoiesis. Plasma cell infiltrate: Interstitial, lambda monotypic and representing approximately 60% the bone marrow cellularity, by  immunohistochemical stain. Cytpgenetics with 2 related hypodiploid clones. One with loss of Y, monosomy 13 and 14 (5%) and one with translocation of 8p and 14, derivative 16 with long arm replaced by extra copy  1q,monosomy 21. FISH showed gain of 1q, loss of 13 and 14, IGH-MYC fusion t(8:14)    - Started Miracle-RVd 21 day with velcade on days 1,8,15.     -Cycle 2 Miracle-RVd. Dose reduce dex to 80 mg d/t fatigue and stomach upset on dex days. Also having cough with basilar streaking on CXR    - 8/31/2021 BM bx -rare suspicious plasma cells in touch imprint. No increase in plasma cells by morph.    -8/31/2021 PET/CT Diffuse osteolytic lesions throughout the axial and appendicular skeleton. Increased sclerosis since prior exam 5/11/2021 without increased metabolism or size.  Hypermetabolic pretracheal lymph node as well as hypermetabolic level 2 lymph nodes within the right neck. These lymph nodes are likely reactive from autoimmune activation via medical therapy. Hypermetabolic subcentimeter nodules within the thyroid gland    - 11/11/2021 Autologous BMT    - 2/21/22 started maintenance with Revlimid 10 mg and daratumumab monthly; did Revlimid alone for C1 due to low IgG levels, added daratumumab starting C2.  Initially did Revlimid daily, decreased to 21/28 days in Nov 2023 to help minimize side effects    Interval Hx:   Just got back from a work trip last Friday  It's his birthday!  +congestion and nasal mucous  Some pressure by R eye associated  No neurological concerns or vision changes  No fever/body aches  BP at home usually 122/78      Physical Exam:  There were no vitals taken for this visit.    Wt Readings from Last 5 Encounters:   05/24/24 71 kg (156 lb 9.6 oz)   05/14/24 70.3 kg (155 lb)   04/26/24 69 kg (152 lb 3.2 oz)   03/29/24 70.8 kg (156 lb 1.6 oz)   03/01/24 70.3 kg (154 lb 14.4 oz)       Constitutional: Appears stated age, well-groomed.   HEENT: Normocephilic. EOMI, PERRL. Sclerae are anicteric.   Respiratory: No increased work of breathing, good air exchange  Cardiovascular: No edema  GI: +BS. Soft. No tenderness on palpation.  Skin: No bruising or bleeding, normal skin color, texture, turgor, no redness,  "warmth, or swelling, no rashes, no lesions, no jaundice.  Neurologic: Awake, alert, oriented to name, place and time.    Vascular access: PIV      Allergies   Allergen Reactions     Blood Transfusion Related (Informational Only) Other (See Comments)     Patient has a history of a clinically significant antibody against RBC antigens.  A delay in compatible RBCs may occur.      Current Outpatient Medications   Medication Sig Dispense Refill     acyclovir (ZOVIRAX) 400 MG tablet Take 1 tablet (400 mg) by mouth 2 times daily 60 tablet 3     albuterol (PROAIR HFA/PROVENTIL HFA/VENTOLIN HFA) 108 (90 Base) MCG/ACT inhaler Inhale 2 puffs into the lungs 4 times daily 18 g 0     aspirin (ASA) 81 MG EC tablet Take 1 tablet (81 mg) by mouth daily       calcium carbonate-vitamin D (OSCAL) 500-5 MG-MCG tablet TAKE ONE TABLET BY MOUTH ONCE DAILY 90 tablet 3     LENalidomide (REVLIMID) 10 MG CAPS capsule Take 1 capsule (10 mg) by mouth daily 21 capsule 0     LENalidomide (REVLIMID) 10 MG CAPS capsule Take 1 capsule (10 mg) by mouth daily 21 capsule 0     levothyroxine (SYNTHROID/LEVOTHROID) 125 MCG tablet Take 1 tablet (125 mcg) by mouth daily 90 tablet 1     Needle, Disp, (B-D BLUNT FILL NEEDLE) 18G X 1-1/2\" MISC For use with testosterone injection: Disposable syringe and needles, use one 18 G needle to draw up medication then switch to 23 G injection needle 25 each 1     Needle, Disp, (BD DISP NEEDLE) 23G X 1\" MISC For use with testosterone injection: Disposable syringe and needles, use one 18 G needle to draw up medication then switch to 23 G injection needle 25 each 1     potassium chloride carson ER (KLOR-CON M10) 10 MEQ CR tablet TAKE TWO TABLETS BY MOUTH  DAILY 60 tablet 3     rosuvastatin (CRESTOR) 40 MG tablet Take 1 tablet (40 mg) by mouth daily 90 tablet 3     syringe, disposable, 1 ML MISC For use with testosterone injection: Disposable syringe and 2 needles, use one 18 G needle to draw up medication then switch to 23 G " injection needle 25 each 1     testosterone cypionate (DEPOTESTOSTERONE) 200 MG/ML injection Inject 0.75 mLs (150 mg) into the muscle every 14 days 10 mL 0     vitamin B complex with vitamin C (STRESS TAB) tablet Take 1 tablet by mouth daily 90 tablet 3     vitamin C with B complex (B COMPLEX-C) tablet TAKE ONE TABLET BY MOUTH ONCE DAILY 90 tablet 3     No current facility-administered medications for this visit.     Most Recent 3 CBC's:  Recent Labs   Lab Test 05/24/24  1528 04/26/24  1425 03/29/24  1405   WBC 4.9 4.2 4.3   HGB 11.6* 12.6* 11.6*   MCV 86 87 87    369 370   ANEUTAUTO 2.4 1.5* 2.1     Most Recent 3 BMP's:  Recent Labs   Lab Test 05/24/24  1528 05/14/24  1553 04/26/24  1425    138 136   POTASSIUM 3.9 3.4 3.7   CHLORIDE 104 104 105   CO2 23 22 19*   BUN 18.8 12.2 18.5   CR 1.27* 1.11 1.17   ANIONGAP 10 12 12   HARDEEP 8.9 8.7 8.0*   * 104* 93   PROTTOTAL 6.0* 6.2* 6.5   ALBUMIN 4.2 4.3 4.4    Most Recent 3 LFT's:  Recent Labs   Lab Test 05/24/24  1528 05/14/24  1553 04/26/24  1425   AST 24 31 32   ALT 14 14 15   ALKPHOS 59 54 60   BILITOTAL 0.4 0.5 0.3    Most Recent 2 TSH and T4:  Recent Labs   Lab Test 01/08/24  0713 06/26/23  1442 12/19/22  1103 06/17/22  1332 11/14/21  1103 10/20/21  1145   TSH 0.86 0.58   < > 0.07*   < > 0.70   T4  --   --   --  1.36  --  0.97    < > = values in this interval not displayed.     I reviewed the above labs today.    Assessment and Plan  Multiple myeloma with high risk cytogenetics  - Miracle-RVD with VGPR, then underwent autologous transplant 11/11/21.   - Given his high risk cytogenetics he started miracle + revlimid maintenance therapy 2/21/22.  - Completed 2 years of zometa in Sept 2023. Continue Vit D.   - Revlimid was decreased to 21/28 days in Nov 2023 to try to minimize side effects (dysgeusia, flu-like symptoms on days following daratumumab)  - Continue acyclovir daily for viral ppx   - Received influenza vaccine 12/15/23  - COVID + 10/19/23.   Received COVID booster 02/02/24   - Pneumococcal vaccine given 02/02/24   - Got first Shingrix vaccine 3/4/24  - Hep A vaccine given 5/14/24--to be repeated in 12 weeks  - MMR vaccine 5/14/24    Hypogammaglobulinemia   Start monthly IV Ig if IgG <300  - 11/16/23 IgG 438  - 2/2/24 IgG 465    Normocytic Anemia:   - Likely d/t revlimid and daratumumab.   - Will keep monitoring and dose reduce if needed.      Nasal congestion, R>L:   -seems viral in nature based on exam/assessment  -I encouraged him to reach out to us if sx progress or he becomes febrile.       RTC in July with DOROTA    30 minutes spent on the date of the encounter doing chart review, review of test results, interpretation of tests, patient visit, and documentation     Vi Bourne, KELSY         Again, thank you for allowing me to participate in the care of your patient.        Sincerely,        Vi Bourne, CNP

## 2024-06-25 NOTE — PROGRESS NOTES
[facility]  Date of visit: 06/25/2024        Oncologic History  - 4/30/2021 M spike of 34.8 in urine.M spike in blood 0.2; IgG 702 with depressed IgA and IgM. Beta 2 microglobulin 2.7. Lambda  with K/L ratio of 0.01. WBC 11.1 with absolute eos of 1.0. hgb, plt, Cr (1.1), and albumin WNL.    -4/30/2021 CT abd/pelvis showed sclerotic marrow changes with numerous lytic appearing lesions throughout the imaged portions of the spine, pelvis and ribs.      -PET scan 5/11/2021 Numerous osteolytic lesions which predominantly demonstrate uptake below liver background levels. There is one intraosseous lesion in the left lateral 9th rib that demonstrates uptake above background, possibly due to nondisplaced fracture. Adjacent to this lesion is mild hypermetabolism to the left pleura, with new small left pleural effusion, suspicious for malignant effusion versus posttraumatic effusion. Pleural uptake may represent extramedullary myeloma extending along the intercostal space versus inflammation.    - BM bx 5/17/2021 with flow showing 4.0% plasma cells which express CD19 (dim), CD38 (bright), CD45 (dim), , and monotypic cytoplasmic lambda immunoglobulin light chains but lack CD20 and CD56.  Hypercellular bone marrow for age (approximately 75% cellularity) with adequate trilineage hematopoiesis. Plasma cell infiltrate: Interstitial, lambda monotypic and representing approximately 60% the bone marrow cellularity, by  immunohistochemical stain. Cytpgenetics with 2 related hypodiploid clones. One with loss of Y, monosomy 13 and 14 (5%) and one with translocation of 8p and 14, derivative 16 with long arm replaced by extra copy 1q,monosomy 21. FISH showed gain of 1q, loss of 13 and 14, IGH-MYC fusion t(8:14)    - Started Miracle-RVd 21 day with velcade on days 1,8,15.     -Cycle 2 Miracle-RVd. Dose reduce dex to 80 mg d/t fatigue and stomach upset on dex days. Also having cough with basilar  streaking on CXR    - 8/31/2021 BM bx -rare suspicious plasma cells in touch imprint. No increase in plasma cells by morph.    -8/31/2021 PET/CT Diffuse osteolytic lesions throughout the axial and appendicular skeleton. Increased sclerosis since prior exam 5/11/2021 without increased metabolism or size.  Hypermetabolic pretracheal lymph node as well as hypermetabolic level 2 lymph nodes within the right neck. These lymph nodes are likely reactive from autoimmune activation via medical therapy. Hypermetabolic subcentimeter nodules within the thyroid gland    - 11/11/2021 Autologous BMT    - 2/21/22 started maintenance with Revlimid 10 mg and daratumumab monthly; did Revlimid alone for C1 due to low IgG levels, added daratumumab starting C2.  Initially did Revlimid daily, decreased to 21/28 days in Nov 2023 to help minimize side effects    Interval Hx:   Just got back from a work trip last Friday  It's his birthday!  +congestion and nasal mucous  Some pressure by R eye associated  No neurological concerns or vision changes  No fever/body aches  BP at home usually 122/78      Physical Exam:  There were no vitals taken for this visit.    Wt Readings from Last 5 Encounters:   05/24/24 71 kg (156 lb 9.6 oz)   05/14/24 70.3 kg (155 lb)   04/26/24 69 kg (152 lb 3.2 oz)   03/29/24 70.8 kg (156 lb 1.6 oz)   03/01/24 70.3 kg (154 lb 14.4 oz)       Constitutional: Appears stated age, well-groomed.   HEENT: Normocephilic. EOMI, PERRL. Sclerae are anicteric.   Respiratory: No increased work of breathing, good air exchange  Cardiovascular: No edema  GI: +BS. Soft. No tenderness on palpation.  Skin: No bruising or bleeding, normal skin color, texture, turgor, no redness, warmth, or swelling, no rashes, no lesions, no jaundice.  Neurologic: Awake, alert, oriented to name, place and time.    Vascular access: PIV      Allergies   Allergen Reactions    Blood Transfusion Related (Informational Only) Other (See Comments)     Patient has  "a history of a clinically significant antibody against RBC antigens.  A delay in compatible RBCs may occur.      Current Outpatient Medications   Medication Sig Dispense Refill    acyclovir (ZOVIRAX) 400 MG tablet Take 1 tablet (400 mg) by mouth 2 times daily 60 tablet 3    albuterol (PROAIR HFA/PROVENTIL HFA/VENTOLIN HFA) 108 (90 Base) MCG/ACT inhaler Inhale 2 puffs into the lungs 4 times daily 18 g 0    aspirin (ASA) 81 MG EC tablet Take 1 tablet (81 mg) by mouth daily      calcium carbonate-vitamin D (OSCAL) 500-5 MG-MCG tablet TAKE ONE TABLET BY MOUTH ONCE DAILY 90 tablet 3    LENalidomide (REVLIMID) 10 MG CAPS capsule Take 1 capsule (10 mg) by mouth daily 21 capsule 0    LENalidomide (REVLIMID) 10 MG CAPS capsule Take 1 capsule (10 mg) by mouth daily 21 capsule 0    levothyroxine (SYNTHROID/LEVOTHROID) 125 MCG tablet Take 1 tablet (125 mcg) by mouth daily 90 tablet 1    Needle, Disp, (B-D BLUNT FILL NEEDLE) 18G X 1-1/2\" MISC For use with testosterone injection: Disposable syringe and needles, use one 18 G needle to draw up medication then switch to 23 G injection needle 25 each 1    Needle, Disp, (BD DISP NEEDLE) 23G X 1\" MISC For use with testosterone injection: Disposable syringe and needles, use one 18 G needle to draw up medication then switch to 23 G injection needle 25 each 1    potassium chloride carson ER (KLOR-CON M10) 10 MEQ CR tablet TAKE TWO TABLETS BY MOUTH  DAILY 60 tablet 3    rosuvastatin (CRESTOR) 40 MG tablet Take 1 tablet (40 mg) by mouth daily 90 tablet 3    syringe, disposable, 1 ML MISC For use with testosterone injection: Disposable syringe and 2 needles, use one 18 G needle to draw up medication then switch to 23 G injection needle 25 each 1    testosterone cypionate (DEPOTESTOSTERONE) 200 MG/ML injection Inject 0.75 mLs (150 mg) into the muscle every 14 days 10 mL 0    vitamin B complex with vitamin C (STRESS TAB) tablet Take 1 tablet by mouth daily 90 tablet 3    vitamin C with B complex " (B COMPLEX-C) tablet TAKE ONE TABLET BY MOUTH ONCE DAILY 90 tablet 3     No current facility-administered medications for this visit.     Most Recent 3 CBC's:  Recent Labs   Lab Test 05/24/24  1528 04/26/24  1425 03/29/24  1405   WBC 4.9 4.2 4.3   HGB 11.6* 12.6* 11.6*   MCV 86 87 87    369 370   ANEUTAUTO 2.4 1.5* 2.1     Most Recent 3 BMP's:  Recent Labs   Lab Test 05/24/24  1528 05/14/24  1553 04/26/24  1425    138 136   POTASSIUM 3.9 3.4 3.7   CHLORIDE 104 104 105   CO2 23 22 19*   BUN 18.8 12.2 18.5   CR 1.27* 1.11 1.17   ANIONGAP 10 12 12   HARDEEP 8.9 8.7 8.0*   * 104* 93   PROTTOTAL 6.0* 6.2* 6.5   ALBUMIN 4.2 4.3 4.4    Most Recent 3 LFT's:  Recent Labs   Lab Test 05/24/24  1528 05/14/24  1553 04/26/24  1425   AST 24 31 32   ALT 14 14 15   ALKPHOS 59 54 60   BILITOTAL 0.4 0.5 0.3    Most Recent 2 TSH and T4:  Recent Labs   Lab Test 01/08/24  0713 06/26/23  1442 12/19/22  1103 06/17/22  1332 11/14/21  1103 10/20/21  1145   TSH 0.86 0.58   < > 0.07*   < > 0.70   T4  --   --   --  1.36  --  0.97    < > = values in this interval not displayed.     I reviewed the above labs today.    Assessment and Plan  Multiple myeloma with high risk cytogenetics  - Miracle-RVD with VGPR, then underwent autologous transplant 11/11/21.   - Given his high risk cytogenetics he started miracle + revlimid maintenance therapy 2/21/22--continue today  - Completed 2 years of zometa in Sept 2023. Continue Vit D.   - Revlimid was decreased to 21/28 days in Nov 2023 to try to minimize side effects (dysgeusia, flu-like symptoms on days following daratumumab)  - Continue acyclovir daily for viral ppx   - Received influenza vaccine 12/15/23  - COVID + 10/19/23.  Received COVID booster 02/02/24   - Pneumococcal vaccine given 02/02/24   - Got first Shingrix vaccine 3/4/24  - Hep A vaccine given 5/14/24--to be repeated in 12 weeks  - MMR vaccine 5/14/24    Hypogammaglobulinemia   Start monthly IV Ig if IgG <300  - 11/16/23 IgG 438  -  2/2/24 IgG 465    Normocytic Anemia:   - Likely d/t revlimid and daratumumab.   - Will keep monitoring and dose reduce if needed.      Nasal congestion, R>L:   -seems viral in nature based on exam/assessment  -I encouraged him to reach out to us if sx progress or he becomes febrile.       RTC in July with DOROTA; will confirm timing with infusion finance given delay of this cycle     30 minutes spent on the date of the encounter doing chart review, review of test results, interpretation of tests, patient visit, and documentation     Vi Bourne, CNP

## 2024-06-26 DIAGNOSIS — C90.00 MULTIPLE MYELOMA NOT HAVING ACHIEVED REMISSION (H): Primary | ICD-10-CM

## 2024-06-27 ENCOUNTER — NURSE TRIAGE (OUTPATIENT)
Dept: ONCOLOGY | Facility: CLINIC | Age: 54
End: 2024-06-27
Payer: COMMERCIAL

## 2024-06-27 DIAGNOSIS — J01.90 ACUTE NON-RECURRENT SINUSITIS, UNSPECIFIED LOCATION: Primary | ICD-10-CM

## 2024-06-27 RX ORDER — AMOXICILLIN AND CLAVULANATE POTASSIUM 562.5; 437.5; 62.5 MG/1; MG/1; MG/1
2 TABLET, MULTILAYER, EXTENDED RELEASE ORAL 2 TIMES DAILY
Qty: 20 TABLET | Refills: 0 | Status: SHIPPED | OUTPATIENT
Start: 2024-06-27 | End: 2024-07-02

## 2024-06-27 NOTE — TELEPHONE ENCOUNTER
Oncology Nurse Triage - Reporting Symptoms    Situation:   June reporting the following symptoms: dizziness       Background:   Treating Provider:   Dr Vazquez     Date of last office visit: 6/25/24 Vi jackson     Recent treatments: Yes: 6/25/24 C32 D1 daratumumab inj.       Assessment  Onset of symptoms: dizziness started yesterday. Usually has flu like symptoms after injection but this time he is also dizzy.   If he moves his head and he is dizzy, tries to focus on paper or tv or phone and he gets nauseous.   Usually feels hot and cold after getting daratumumab but the reason for the call is the added dizziness.   No fevers.     Trying his best to drink as much as he can.  He is eating, ate 1/2 of sandwich just now.       Continues with headache above Right eye rates as 6/10 and is tender to touch above right eye.   No nasal congestion or runny nose.     Paged provider: 12:02 Epic secure chat sent to Vi Jackson who included Laura Ballesteros in the chat     Recommendations:   Per Laura Ballesteros will do course of antibiotics to treat for possible sinus infection and patient should let us know if not better by Monday.     Call placed to Logan, advised that Laura Ballesteros would like to do course of antibiotics for possible sinus infection.  Pharmacy would be [facility].   Should let us know if not better by Monday.     Patient is in agreement and will call back if not better by Monday.

## 2024-06-27 NOTE — PROGRESS NOTES
[facility]  [address] [place] [address]   July 2, 2024        Rudy Zhongpauljane  [address]  [facility] [address]          Dear Manual,     Attached is an updated Patient Centered Plan of Care for your continued enrollment in Care Coordination. Please let us know if you have additional questions, concerns, or goals that we can assist with.    Sincerely,    Lianne Avitia RN, BSN, PHN Care Coordinator  [place], [place], and [place]   Phone: [phone]             [facility]  Patient Centered Plan of Care  About Me:        Patient Name:  Rudy Beth    YOB: 1941  Age:         83 year old   Holder MRN:    [id number] Telephone Information:  Home Phone [phone]   Mobile [phone]       Address:  [address]  [facility] [address] Email address:  No e-mail address on record      Emergency Contact(s)    Name Relationship Lgl Grd Work Phone Home Phone Mobile Phone   1. ASHWIN HARPER Daughter No   [phone]   2. PREETPAULREBA [place] Son No   [phone]           Primary language:  English     needed? No   Holder Language Services:  [phone] op. 1  Other communication barriers:None    Preferred Method of Communication:  Mail  Current living arrangement: I live alone    Mobility Status/ Medical Equipment: Independent w/Device        Health Maintenance  Health Maintenance Reviewed: No data recorded    My Access Plan  Medical Emergency 911   Primary Clinic Line [facility] - [phone]   24 Hour Appointment Line [phone] or  [phone]) (toll-free)   24 Hour Nurse Line [phone] (toll-free)   Preferred Urgent Care [facility], [phone]     Preferred Hospital [facility], [place]  [phone]     Preferred Pharmacy [facility] [address], [place] - [address] [facility]     [facility] Crisis Line [facility]  This is a recent snapshot of the patient's [facility] medical record.  For current drug dose and complete information and questions, call [phone] or In Basket pool, fv home infusion ([id number])  CSN Number:  [id number]       Suicide Prevention Lifeline at 1-500.423.8775 or Text/Call 988           My Care Team Members  Patient Care Team         Relationship Specialty Notifications Start End    Henry Cook DO PCP - General Internal Medicine  5/16/14     Phone: [phone] Fax: [phone] [address] [place] [address]    Fabian Butler, [facility] Pharmacist Pharmacist Clinician- Clinical Pharmacy Specialist  8/9/19     Phone: [phone] Fax: [phone] [address] [place] [address]    Bisi Valenzuela Diabetes Educator Dietitian, Registered  1/23/20     Fax: [phone]          [address] [facility] [address]    Henry Cook DO Assigned PCP   9/20/20     Phone: [phone] Fax: [phone] [address] [place] [address]    Lianne Avitia, RN Lead Care Coordinator Primary Care - CC Admissions 2/9/21     Phone: [phone] Fax: [phone]        Fabian Butler, [facility] Assigned MTM Pharmacist   1/21/23     Phone: [phone] Fax: [phone] [address] [place] [address]    Laya Bernard MD MD Internal Medicine  2/23/23     Phone: [phone] Fax: [phone] [address] [facility] [address]    Laya Bernard MD Assigned Nephrology Provider   3/4/23     Phone: [phone] Fax: [phone] [address] [facility] [address]    Tremaine Palma DPM Assigned Surgical Provider   12/2/23     Phone: [phone] Fax: [phone] [address] [place] [address]    Marixa Coronel, RN Specialty Care Coordinator Nephrology Abnormal results only, Admissions 1/24/24     Phone: [phone]         Radha Obrien, RN Diabetes Educator Diabetes Education  4/8/24     Phone: [phone] Fax: [phone]                    My Care Plans  Self Management and Treatment Plan    Care Plan  Care Plan: Physical Activity       Problem: Patient is lass active as he wishes to be       Goal: Increase  Physical Activity - go to the health club 5x per week and walk 1 mile each session.       Start Date: 12/28/2023 Expected End Date: 8/13/2024    Recent Progress: On track    Note:     Barriers: leg pain   Strengths: patient is motivated to work on health   Patient expressed understanding of goal: yes   Action steps to achieve this goal:  1. Jan 1st, I have a goal to attend the health club 5x per week aiming for 1 mile each session.   2. I will increase my distance as able. Listening to my body.                                Care Plan: Health Maintenance       Problem: Health Maintenance Due or Overdue       Goal: Become up-to-date with health maintenance visit(s)       Start Date: 4/12/2024 Expected End Date: 7/12/2024    Note:     Goal Statement: I will complete my annual wellness visit.    Barriers: none  Strengths: patient is motivated to work on health and wellness    Patient expressed understanding of goal: yes  Action steps to achieve this goal:  1. I will schedule my annual wellness visit; [phone]  2. I will attend my annual wellness visit.  3. I will contact my Care Management or clinic team if I have barriers to attending my annual wellness visit.                                Action Plans on File:   Asthma     COPD  Depression          Advance Care Plans/Directives:   Advanced Care Plan/Directives on file:   Yes    Status of Document(s): No data recorded  Advanced Care Plan/Directives Type:   Advanced Directive - On File           My Medical and Care Information  Problem List   Patient Active Problem List   Diagnosis    Diabetes mellitus with background retinopathy (H)    Impotence of organic origin    Personal history of other diseases of respiratory system    Congenital anomaly of diaphragm    LVH    Chronic diastolic heart failure (H)    Type 2 diabetes mellitus with retinopathy, with long-term current use of insulin (H)    Tinnitus    Microalbuminuria    Hyperlipidemia with target  LDL less than 100    Type 2 diabetes mellitus with stage 3 chronic kidney disease (H)    HTN, goal below 140/90    Dyspnea and respiratory abnormality    Loss of weight    CKD (chronic kidney disease) stage 3, GFR 30-59 ml/min (H)    ASHOK (obstructive sleep apnea)    Diabetic neuropathy (H)    Calcific shoulder tendinitis    COPD (chronic obstructive pulmonary disease) (H)    Chronic diastolic congestive heart failure (H)    Chronic bronchitis, unspecified chronic bronchitis type (H)    History of non-ST elevation myocardial infarction (NSTEMI)    LVH (left ventricular hypertrophy) mild-mod per Echo 2014    Elevated lactic acid level    ACS (acute coronary syndrome) (H)    Paresthesias    Primary osteoarthritis of right knee    Stable angina (H24)    ASCVD (arteriosclerotic cardiovascular disease) s/p stenting in 5/2019    Person under investigation for COVID-19    Hypomagnesemia    Status post total right knee replacement    NSTEMI (non-ST elevated myocardial infarction) (H)    Physical deconditioning    Diabetes mellitus with peripheral vascular disease (H)    Hammertoe of left foot    Status post coronary angiogram    Chest pain    Anemia of chronic renal failure          Care Coordination Start Date: 2/9/2021   Frequency of Care Coordination: monthly, more frequently as needed     Form Last Updated: 07/02/2024

## 2024-06-28 DIAGNOSIS — C90.00 MULTIPLE MYELOMA NOT HAVING ACHIEVED REMISSION (H): Primary | ICD-10-CM

## 2024-06-28 RX ORDER — LENALIDOMIDE 10 MG/1
10 CAPSULE ORAL DAILY
Qty: 21 CAPSULE | Refills: 0 | Status: SHIPPED | OUTPATIENT
Start: 2024-07-19 | End: 2024-09-27

## 2024-07-01 DIAGNOSIS — C90.00 MULTIPLE MYELOMA NOT HAVING ACHIEVED REMISSION (H): Primary | ICD-10-CM

## 2024-07-01 RX ORDER — DIPHENHYDRAMINE HCL 25 MG
50 CAPSULE ORAL ONCE
Status: CANCELLED
Start: 2024-08-16 | End: 2024-08-16

## 2024-07-01 RX ORDER — ALBUTEROL SULFATE 0.83 MG/ML
2.5 SOLUTION RESPIRATORY (INHALATION)
Status: CANCELLED | OUTPATIENT
Start: 2024-08-16

## 2024-07-01 RX ORDER — EPINEPHRINE 1 MG/ML
0.3 INJECTION, SOLUTION INTRAMUSCULAR; SUBCUTANEOUS EVERY 5 MIN PRN
Status: CANCELLED | OUTPATIENT
Start: 2024-08-16

## 2024-07-01 RX ORDER — METHYLPREDNISOLONE SODIUM SUCCINATE 125 MG/2ML
125 INJECTION, POWDER, LYOPHILIZED, FOR SOLUTION INTRAMUSCULAR; INTRAVENOUS
Status: CANCELLED
Start: 2024-08-16

## 2024-07-01 RX ORDER — DEXAMETHASONE 4 MG/1
12 TABLET ORAL ONCE
Status: CANCELLED
Start: 2024-08-16 | End: 2024-08-16

## 2024-07-01 RX ORDER — ALBUTEROL SULFATE 90 UG/1
1-2 AEROSOL, METERED RESPIRATORY (INHALATION)
Status: CANCELLED
Start: 2024-08-16

## 2024-07-01 RX ORDER — HEPARIN SODIUM (PORCINE) LOCK FLUSH IV SOLN 100 UNIT/ML 100 UNIT/ML
5 SOLUTION INTRAVENOUS
Status: CANCELLED | OUTPATIENT
Start: 2024-08-16

## 2024-07-01 RX ORDER — ACETAMINOPHEN 325 MG/1
650 TABLET ORAL ONCE
Status: CANCELLED
Start: 2024-08-16 | End: 2024-08-16

## 2024-07-01 RX ORDER — NALOXONE HYDROCHLORIDE 0.4 MG/ML
0.2 INJECTION, SOLUTION INTRAMUSCULAR; INTRAVENOUS; SUBCUTANEOUS
Status: CANCELLED | OUTPATIENT
Start: 2024-08-16

## 2024-07-01 RX ORDER — HEPARIN SODIUM,PORCINE 10 UNIT/ML
5 VIAL (ML) INTRAVENOUS
Status: CANCELLED | OUTPATIENT
Start: 2024-08-16

## 2024-07-01 RX ORDER — DIPHENHYDRAMINE HYDROCHLORIDE 50 MG/ML
50 INJECTION INTRAMUSCULAR; INTRAVENOUS
Status: CANCELLED
Start: 2024-08-16

## 2024-07-01 RX ORDER — MEPERIDINE HYDROCHLORIDE 25 MG/ML
25 INJECTION INTRAMUSCULAR; INTRAVENOUS; SUBCUTANEOUS EVERY 30 MIN PRN
Status: CANCELLED | OUTPATIENT
Start: 2024-08-16

## 2024-07-03 LAB
ABC 95% CONFIDENCE INTERVAL (B-CELL): NORMAL
ABC CLONOSEQ B-CELL TRACKING (MRD) RESULT: NORMAL
ABC DOMINANT SEQUENCES (B-CELL): 3
ABC RESIDUAL CLONAL CELLS/ MILL NUCLEATED CELLS BCELL: NORMAL

## 2024-07-05 ENCOUNTER — TELEPHONE (OUTPATIENT)
Dept: ONCOLOGY | Facility: CLINIC | Age: 54
End: 2024-07-05
Payer: COMMERCIAL

## 2024-07-05 DIAGNOSIS — D80.1 HYPOGAMMAGLOBULINEMIA (H): Primary | ICD-10-CM

## 2024-07-05 RX ORDER — ACETAMINOPHEN 325 MG/1
650 TABLET ORAL ONCE
Start: 2024-07-05

## 2024-07-05 RX ORDER — DIPHENHYDRAMINE HYDROCHLORIDE 50 MG/ML
50 INJECTION INTRAMUSCULAR; INTRAVENOUS
Start: 2024-07-05

## 2024-07-05 RX ORDER — METHYLPREDNISOLONE SODIUM SUCCINATE 125 MG/2ML
125 INJECTION, POWDER, LYOPHILIZED, FOR SOLUTION INTRAMUSCULAR; INTRAVENOUS
Start: 2024-07-05

## 2024-07-05 RX ORDER — HEPARIN SODIUM (PORCINE) LOCK FLUSH IV SOLN 100 UNIT/ML 100 UNIT/ML
5 SOLUTION INTRAVENOUS
OUTPATIENT
Start: 2024-07-05

## 2024-07-05 RX ORDER — HEPARIN SODIUM,PORCINE 10 UNIT/ML
5 VIAL (ML) INTRAVENOUS
OUTPATIENT
Start: 2024-07-05

## 2024-07-05 RX ORDER — ALBUTEROL SULFATE 90 UG/1
1-2 AEROSOL, METERED RESPIRATORY (INHALATION)
Start: 2024-07-05

## 2024-07-05 RX ORDER — DIPHENHYDRAMINE HCL 25 MG
50 CAPSULE ORAL ONCE
Start: 2024-07-05

## 2024-07-05 RX ORDER — EPINEPHRINE 1 MG/ML
0.3 INJECTION, SOLUTION INTRAMUSCULAR; SUBCUTANEOUS EVERY 5 MIN PRN
OUTPATIENT
Start: 2024-07-05

## 2024-07-05 RX ORDER — MEPERIDINE HYDROCHLORIDE 25 MG/ML
25 INJECTION INTRAMUSCULAR; INTRAVENOUS; SUBCUTANEOUS EVERY 30 MIN PRN
OUTPATIENT
Start: 2024-07-05

## 2024-07-05 RX ORDER — ALBUTEROL SULFATE 0.83 MG/ML
2.5 SOLUTION RESPIRATORY (INHALATION)
OUTPATIENT
Start: 2024-07-05

## 2024-07-05 NOTE — TELEPHONE ENCOUNTER
Oral Chemotherapy Monitoring Program   Medication: Revlimid  Rx: 10mg PO daily on days 1 through 21 of 28 day cycle   Auth #: [id number]   Risk Category: Adult Male  Routine survey questions reviewed.   Rx to be Escribed to [facility]    Yu Goldberg  [facility]  Oncology Pharmacy Liaison   [email or web address]  [phone] (phone)  [phone] (fax)

## 2024-07-16 ENCOUNTER — MYC MEDICAL ADVICE (OUTPATIENT)
Dept: ONCOLOGY | Facility: CLINIC | Age: 54
End: 2024-07-16
Payer: COMMERCIAL

## 2024-07-17 NOTE — PROGRESS NOTES
[facility]  Date of visit: 07/19/2024        Oncologic History  - 4/30/2021 M spike of 34.8 in urine.M spike in blood 0.2; IgG 702 with depressed IgA and IgM. Beta 2 microglobulin 2.7. Lambda  with K/L ratio of 0.01. WBC 11.1 with absolute eos of 1.0. hgb, plt, Cr (1.1), and albumin WNL.    -4/30/2021 CT abd/pelvis showed sclerotic marrow changes with numerous lytic appearing lesions throughout the imaged portions of the spine, pelvis and ribs.      -PET scan 5/11/2021 Numerous osteolytic lesions which predominantly demonstrate uptake below liver background levels. There is one intraosseous lesion in the left lateral 9th rib that demonstrates uptake above background, possibly due to nondisplaced fracture. Adjacent to this lesion is mild hypermetabolism to the left pleura, with new small left pleural effusion, suspicious for malignant effusion versus posttraumatic effusion. Pleural uptake may represent extramedullary myeloma extending along the intercostal space versus inflammation.    - BM bx 5/17/2021 with flow showing 4.0% plasma cells which express CD19 (dim), CD38 (bright), CD45 (dim), , and monotypic cytoplasmic lambda immunoglobulin light chains but lack CD20 and CD56.  Hypercellular bone marrow for age (approximately 75% cellularity) with adequate trilineage hematopoiesis. Plasma cell infiltrate: Interstitial, lambda monotypic and representing approximately 60% the bone marrow cellularity, by  immunohistochemical stain. Cytpgenetics with 2 related hypodiploid clones. One with loss of Y, monosomy 13 and 14 (5%) and one with translocation of 8p and 14, derivative 16 with long arm replaced by extra copy 1q,monosomy 21. FISH showed gain of 1q, loss of 13 and 14, IGH-MYC fusion t(8:14)    - Started Miracle-RVd 21 day with velcade on days 1,8,15.     -Cycle 2 Miracle-RVd. Dose reduce dex to 80 mg d/t fatigue and stomach upset on dex days. Also having cough with basilar  streaking on CXR    - 8/31/2021 BM bx -rare suspicious plasma cells in touch imprint. No increase in plasma cells by morph.    -8/31/2021 PET/CT Diffuse osteolytic lesions throughout the axial and appendicular skeleton. Increased sclerosis since prior exam 5/11/2021 without increased metabolism or size.  Hypermetabolic pretracheal lymph node as well as hypermetabolic level 2 lymph nodes within the right neck. These lymph nodes are likely reactive from autoimmune activation via medical therapy. Hypermetabolic subcentimeter nodules within the thyroid gland    - 11/11/2021 Autologous BMT    - 2/21/22 started maintenance with Revlimid 10 mg and daratumumab monthly; did Revlimid alone for C1 due to low IgG levels, added daratumumab starting C2.  Initially did Revlimid daily, decreased to 21/28 days in Nov 2023 to help minimize side effects    Interval Hx:   Logan presents to clinic today for toxicity check prior to C32D1.  He is overall doing well other than nasal congestion since he traveled.  His energy levels and appetite are good with no concerns.  He is no longer having pressure with the R eye and resolved with antibiotics.  Denies fevers/chills, night sweats, SOB/cough, chest pain, rashes/sores, bleeding issues, new pains, all other acute issues or concerns.    Physical Exam:  /82   Pulse 87   Temp 98  F (36.7  C)   Resp 16   Wt 71.3 kg (157 lb 3.2 oz)   SpO2 99%   BMI 26.57 kg/m      Wt Readings from Last 5 Encounters:   07/19/24 71.3 kg (157 lb 3.2 oz)   06/25/24 71.6 kg (157 lb 12.8 oz)   05/24/24 71 kg (156 lb 9.6 oz)   05/14/24 70.3 kg (155 lb)   04/26/24 69 kg (152 lb 3.2 oz)     Constitutional: Appears stated age, well-groomed, sounds congested  HEENT: Normocephilic. EOMI, PERRL. Sclerae are anicteric.   Respiratory: No increased work of breathing, good air exchange  Cardiovascular: No edema. Regular rate and rhythym  GI: +BS. Soft. No tenderness on palpation.  Skin: No bruising or bleeding, normal  "skin color, texture, turgor, no redness, warmth, or swelling, no rashes, no lesions, no jaundice.  Neurologic: Awake, alert, oriented to name, place and time.    Vascular access: PIV      Allergies   Allergen Reactions    Blood Transfusion Related (Informational Only) Other (See Comments)     Patient has a history of a clinically significant antibody against RBC antigens.  A delay in compatible RBCs may occur.      Current Outpatient Medications   Medication Sig Dispense Refill    albuterol (PROAIR HFA/PROVENTIL HFA/VENTOLIN HFA) 108 (90 Base) MCG/ACT inhaler Inhale 2 puffs into the lungs 4 times daily 18 g 0    aspirin (ASA) 81 MG EC tablet Take 1 tablet (81 mg) by mouth daily      calcium carbonate-vitamin D (OSCAL) 500-5 MG-MCG tablet TAKE ONE TABLET BY MOUTH ONCE DAILY 90 tablet 3    LENalidomide (REVLIMID) 10 MG CAPS capsule Take 1 capsule (10 mg) by mouth daily 21 capsule 0    levothyroxine (SYNTHROID/LEVOTHROID) 125 MCG tablet Take 1 tablet (125 mcg) by mouth daily 90 tablet 1    Needle, Disp, (B-D BLUNT FILL NEEDLE) 18G X 1-1/2\" MISC For use with testosterone injection: Disposable syringe and needles, use one 18 G needle to draw up medication then switch to 23 G injection needle 25 each 1    Needle, Disp, (BD DISP NEEDLE) 23G X 1\" MISC For use with testosterone injection: Disposable syringe and needles, use one 18 G needle to draw up medication then switch to 23 G injection needle 25 each 1    potassium chloride carson ER (KLOR-CON M10) 10 MEQ CR tablet TAKE TWO TABLETS BY MOUTH  DAILY 60 tablet 3    rosuvastatin (CRESTOR) 40 MG tablet Take 1 tablet (40 mg) by mouth daily 90 tablet 3    testosterone cypionate (DEPOTESTOSTERONE) 200 MG/ML injection Inject 0.75 mLs (150 mg) into the muscle every 14 days 10 mL 0    vitamin C with B complex (B COMPLEX-C) tablet TAKE ONE TABLET BY MOUTH ONCE DAILY 90 tablet 3    acyclovir (ZOVIRAX) 400 MG tablet Take 1 tablet (400 mg) by mouth 2 times daily (Patient not taking: " Reported on 6/25/2024) 60 tablet 3    LENalidomide (REVLIMID) 10 MG CAPS capsule Take 1 capsule (10 mg) by mouth daily (Patient not taking: Reported on 7/19/2024) 21 capsule 0    LENalidomide (REVLIMID) 10 MG CAPS capsule Take 1 capsule (10 mg) by mouth daily (Patient not taking: Reported on 6/25/2024) 21 capsule 0    syringe, disposable, 1 ML MISC For use with testosterone injection: Disposable syringe and 2 needles, use one 18 G needle to draw up medication then switch to 23 G injection needle 25 each 1     No current facility-administered medications for this visit.     Most Recent 3 CBC's:  Recent Labs   Lab Test 07/19/24  1001 06/25/24  1505 05/24/24  1528   WBC 4.5 7.7 4.9   HGB 11.5* 11.5* 11.6*   MCV 84 85 86    467* 420   ANEUTAUTO 2.2 5.4 2.4     Most Recent 3 BMP's:  Recent Labs   Lab Test 07/19/24  1001 06/25/24  1505 05/24/24  1528    138 137   POTASSIUM 4.6 3.9 3.9   CHLORIDE 109* 104 104   CO2 20* 23 23   BUN 16.9 12.3 18.8   CR 1.13 1.09 1.27*   ANIONGAP 10 11 10   HARDEEP 8.9 9.0 8.9   * 119* 121*   PROTTOTAL 6.1* 6.3* 6.0*   ALBUMIN 4.2 4.1 4.2    Most Recent 3 LFT's:  Recent Labs   Lab Test 07/19/24  1001 06/25/24  1505 05/24/24  1528   AST 36 26 24   ALT 20 14 14   ALKPHOS 66 64 59   BILITOTAL 0.4 0.3 0.4    Most Recent 2 TSH and T4:  Recent Labs   Lab Test 01/08/24  0713 06/26/23  1442 12/19/22  1103 06/17/22  1332 11/14/21  1103 10/20/21  1145   TSH 0.86 0.58   < > 0.07*   < > 0.70   T4  --   --   --  1.36  --  0.97    < > = values in this interval not displayed.      Latest Reference Range & Units 06/25/24 15:05   Albumin Fraction 3.7 - 5.1 g/dL 3.8   Alpha 1 Fraction 0.2 - 0.4 g/dL 0.3   Alpha 2 Fraction 0.5 - 0.9 g/dL 0.6   Beta Fraction 0.6 - 1.0 g/dL 0.7   ELP Interpretation:  Very small monoclonal protein (0.1 g/dL) seen in the gamma fraction. See immunofixation report on same specimen. Hypogammaglobulinemia. Pathologic significance requires clinical correlation. Nunu  LEVON Barber., Ph.D.   Gamma Fraction 0.7 - 1.6 g/dL 0.4 (L)   IGA 84 - 499 mg/dL 43 (L)    - 1,616 mg/dL 444 (L)   IGM 35 - 242 mg/dL 22 (L)   Immunofixation ELP  Small monoclonal IgG immunoglobulin of kappa light chain type. Monoclonal antibody therapeutics (e.g. Daratumumab) may appear as monoclonal proteins on serum electrophoresis and immunofixation. Results should be interpreted with caution. Pathologic significance requires clinical correlation.  Nunu Barber M.D., Ph.D.   Kappa Free Lt Chain 0.33 - 1.94 mg/dL 0.63   Kappa Lambda Ratio 0.26 - 1.65  1.03   Lambda Free Lt Chain 0.57 - 2.63 mg/dL 0.61   Monoclonal Peak <=0.0 g/dL 0.1 (H)   Total Protein Serum for ELP 6.4 - 8.3 g/dL 5.9 (L)   (L): Data is abnormally low  (H): Data is abnormally high  I reviewed the above labs today.    Assessment and Plan  Multiple myeloma with high risk cytogenetics  - Miracle-RVD with VGPR, then underwent autologous transplant 11/11/21.   - Given his high risk cytogenetics he started miracle + revlimid maintenance therapy 2/21/22--- proceed with C32D1 today (7/19)  - Completed 2 years of zometa in Sept 2023. Continue Vit D.   - Revlimid was decreased to 21/28 days in Nov 2023 to try to minimize side effects (dysgeusia, flu-like symptoms on days following daratumumab)  - He is currently tolerating treatment well without side-effects.     Ppx/ID  - Continue acyclovir daily for viral ppx   - Received influenza vaccine 12/15/23  - COVID + 10/19/23.  Received COVID booster 02/02/24   - Pneumococcal vaccine given 02/02/24   - Got first Shingrix vaccine 3/4/24  - Hep A vaccine given 5/14/24--to be repeated in 12 weeks  - MMR vaccine 5/14/24    Sinusitis:  Received Augmentin 6/28 for sinus infection with resolution of symptoms.    - 7/13 developed nasal congestion after traveling to [facility]  - 7/19 Covid swab and RVP    Hypogammaglobulinemia   Start monthly IV Ig if IgG <300  - 6/25/24 IgG 444  - If continues to have repeat URI  could consider raising parameters for IVIG    Normocytic Anemia:   - Likely d/t revlimid and daratumumab.   - Will keep monitoring and dose reduce if needed.    - Hgb stable at this time       The longitudinal plan of care for the diagnosis(es)/condition(s) as documented were addressed during this visit. Due to the added complexity in care, I will continue to support Jitendrapal in the subsequent management and with ongoing continuity of care.     PATTY Jamil CNP

## 2024-07-19 ENCOUNTER — APPOINTMENT (OUTPATIENT)
Dept: LAB | Facility: CLINIC | Age: 54
End: 2024-07-19
Attending: REGISTERED NURSE
Payer: COMMERCIAL

## 2024-07-19 ENCOUNTER — ONCOLOGY VISIT (OUTPATIENT)
Dept: ONCOLOGY | Facility: CLINIC | Age: 54
End: 2024-07-19
Attending: REGISTERED NURSE
Payer: COMMERCIAL

## 2024-07-19 VITALS
TEMPERATURE: 98 F | SYSTOLIC BLOOD PRESSURE: 123 MMHG | WEIGHT: 157.2 LBS | HEART RATE: 87 BPM | RESPIRATION RATE: 16 BRPM | BODY MASS INDEX: 26.57 KG/M2 | OXYGEN SATURATION: 99 % | DIASTOLIC BLOOD PRESSURE: 82 MMHG

## 2024-07-19 DIAGNOSIS — C90.00 MULTIPLE MYELOMA NOT HAVING ACHIEVED REMISSION (H): Primary | ICD-10-CM

## 2024-07-19 DIAGNOSIS — B34.8 PARAINFLUENZA: ICD-10-CM

## 2024-07-19 DIAGNOSIS — J01.90 ACUTE NON-RECURRENT SINUSITIS, UNSPECIFIED LOCATION: ICD-10-CM

## 2024-07-19 DIAGNOSIS — C73 PAPILLARY MICROCARCINOMA OF THYROID (H): ICD-10-CM

## 2024-07-19 DIAGNOSIS — R09.81 NASAL CONGESTION: ICD-10-CM

## 2024-07-19 DIAGNOSIS — R79.89 LOW TESTOSTERONE IN MALE: ICD-10-CM

## 2024-07-19 DIAGNOSIS — D80.1 HYPOGAMMAGLOBULINEMIA (H): ICD-10-CM

## 2024-07-19 LAB
ALBUMIN SERPL BCG-MCNC: 4.2 G/DL (ref 3.5–5.2)
ALP SERPL-CCNC: 66 U/L (ref 40–150)
ALT SERPL W P-5'-P-CCNC: 20 U/L (ref 0–70)
ANION GAP SERPL CALCULATED.3IONS-SCNC: 10 MMOL/L (ref 7–15)
AST SERPL W P-5'-P-CCNC: 36 U/L (ref 0–45)
BASOPHILS # BLD AUTO: 0 10E3/UL (ref 0–0.2)
BASOPHILS NFR BLD AUTO: 0 %
BILIRUB SERPL-MCNC: 0.4 MG/DL
BUN SERPL-MCNC: 16.9 MG/DL (ref 6–20)
C PNEUM DNA SPEC QL NAA+PROBE: NOT DETECTED
CALCIUM SERPL-MCNC: 8.9 MG/DL (ref 8.8–10.4)
CHLORIDE SERPL-SCNC: 109 MMOL/L (ref 98–107)
CREAT SERPL-MCNC: 1.13 MG/DL (ref 0.67–1.17)
EGFRCR SERPLBLD CKD-EPI 2021: 76 ML/MIN/1.73M2
EOSINOPHIL # BLD AUTO: 0.3 10E3/UL (ref 0–0.7)
EOSINOPHIL NFR BLD AUTO: 7 %
ERYTHROCYTE [DISTWIDTH] IN BLOOD BY AUTOMATED COUNT: 16.5 % (ref 10–15)
FLUAV H1 2009 PAND RNA SPEC QL NAA+PROBE: NOT DETECTED
FLUAV H1 RNA SPEC QL NAA+PROBE: NOT DETECTED
FLUAV H3 RNA SPEC QL NAA+PROBE: NOT DETECTED
FLUAV RNA SPEC QL NAA+PROBE: NOT DETECTED
FLUBV RNA SPEC QL NAA+PROBE: NOT DETECTED
GLUCOSE SERPL-MCNC: 155 MG/DL (ref 70–99)
HADV DNA SPEC QL NAA+PROBE: NOT DETECTED
HCO3 SERPL-SCNC: 20 MMOL/L (ref 22–29)
HCOV PNL SPEC NAA+PROBE: NOT DETECTED
HCT VFR BLD AUTO: 35.7 % (ref 40–53)
HGB BLD-MCNC: 11.5 G/DL (ref 13.3–17.7)
HMPV RNA SPEC QL NAA+PROBE: NOT DETECTED
HPIV1 RNA SPEC QL NAA+PROBE: NOT DETECTED
HPIV2 RNA SPEC QL NAA+PROBE: NOT DETECTED
HPIV3 RNA SPEC QL NAA+PROBE: NOT DETECTED
HPIV4 RNA SPEC QL NAA+PROBE: NOT DETECTED
IGA SERPL-MCNC: 39 MG/DL (ref 84–499)
IGG SERPL-MCNC: 431 MG/DL (ref 610–1616)
IGM SERPL-MCNC: 18 MG/DL (ref 35–242)
IMM GRANULOCYTES # BLD: 0 10E3/UL
IMM GRANULOCYTES NFR BLD: 0 %
KAPPA LC FREE SER-MCNC: 0.57 MG/DL (ref 0.33–1.94)
KAPPA LC FREE/LAMBDA FREE SER NEPH: 1.04 {RATIO} (ref 0.26–1.65)
LAMBDA LC FREE SERPL-MCNC: 0.55 MG/DL (ref 0.57–2.63)
LYMPHOCYTES # BLD AUTO: 1.3 10E3/UL (ref 0.8–5.3)
LYMPHOCYTES NFR BLD AUTO: 29 %
M PNEUMO DNA SPEC QL NAA+PROBE: NOT DETECTED
MCH RBC QN AUTO: 27.2 PG (ref 26.5–33)
MCHC RBC AUTO-ENTMCNC: 32.2 G/DL (ref 31.5–36.5)
MCV RBC AUTO: 84 FL (ref 78–100)
MONOCYTES # BLD AUTO: 0.6 10E3/UL (ref 0–1.3)
MONOCYTES NFR BLD AUTO: 13 %
NEUTROPHILS # BLD AUTO: 2.2 10E3/UL (ref 1.6–8.3)
NEUTROPHILS NFR BLD AUTO: 51 %
NRBC # BLD AUTO: 0 10E3/UL
NRBC BLD AUTO-RTO: 0 /100
PLATELET # BLD AUTO: 417 10E3/UL (ref 150–450)
POTASSIUM SERPL-SCNC: 4.6 MMOL/L (ref 3.4–5.3)
PROT SERPL-MCNC: 6.1 G/DL (ref 6.4–8.3)
RBC # BLD AUTO: 4.23 10E6/UL (ref 4.4–5.9)
RSV RNA SPEC QL NAA+PROBE: NOT DETECTED
RSV RNA SPEC QL NAA+PROBE: NOT DETECTED
RV+EV RNA SPEC QL NAA+PROBE: NOT DETECTED
SARS-COV-2 RNA RESP QL NAA+PROBE: POSITIVE
SODIUM SERPL-SCNC: 139 MMOL/L (ref 135–145)
T4 FREE SERPL-MCNC: 1.2 NG/DL (ref 0.9–1.7)
TOTAL PROTEIN SERUM FOR ELP: 5.8 G/DL (ref 6.4–8.3)
TSH SERPL DL<=0.005 MIU/L-ACNC: 0.05 UIU/ML (ref 0.3–4.2)
VIT D+METAB SERPL-MCNC: 23 NG/ML (ref 20–50)
WBC # BLD AUTO: 4.5 10E3/UL (ref 4–11)

## 2024-07-19 PROCEDURE — 87581 M.PNEUMON DNA AMP PROBE: CPT

## 2024-07-19 PROCEDURE — 87635 SARS-COV-2 COVID-19 AMP PRB: CPT

## 2024-07-19 PROCEDURE — 86334 IMMUNOFIX E-PHORESIS SERUM: CPT | Mod: 26 | Performed by: PATHOLOGY

## 2024-07-19 PROCEDURE — 82306 VITAMIN D 25 HYDROXY: CPT

## 2024-07-19 PROCEDURE — 36415 COLL VENOUS BLD VENIPUNCTURE: CPT

## 2024-07-19 PROCEDURE — 99215 OFFICE O/P EST HI 40 MIN: CPT

## 2024-07-19 PROCEDURE — 84165 PROTEIN E-PHORESIS SERUM: CPT | Mod: TC | Performed by: PATHOLOGY

## 2024-07-19 PROCEDURE — 84439 ASSAY OF FREE THYROXINE: CPT

## 2024-07-19 PROCEDURE — 86334 IMMUNOFIX E-PHORESIS SERUM: CPT | Performed by: PATHOLOGY

## 2024-07-19 PROCEDURE — 83521 IG LIGHT CHAINS FREE EACH: CPT

## 2024-07-19 PROCEDURE — 85025 COMPLETE CBC W/AUTO DIFF WBC: CPT

## 2024-07-19 PROCEDURE — 96401 CHEMO ANTI-NEOPL SQ/IM: CPT

## 2024-07-19 PROCEDURE — 84165 PROTEIN E-PHORESIS SERUM: CPT | Mod: 26 | Performed by: PATHOLOGY

## 2024-07-19 PROCEDURE — 250N000011 HC RX IP 250 OP 636: Performed by: STUDENT IN AN ORGANIZED HEALTH CARE EDUCATION/TRAINING PROGRAM

## 2024-07-19 PROCEDURE — 86800 THYROGLOBULIN ANTIBODY: CPT

## 2024-07-19 PROCEDURE — 99213 OFFICE O/P EST LOW 20 MIN: CPT | Mod: 25

## 2024-07-19 PROCEDURE — 82784 ASSAY IGA/IGD/IGG/IGM EACH: CPT

## 2024-07-19 PROCEDURE — 87633 RESP VIRUS 12-25 TARGETS: CPT

## 2024-07-19 PROCEDURE — 84155 ASSAY OF PROTEIN SERUM: CPT

## 2024-07-19 PROCEDURE — G2211 COMPLEX E/M VISIT ADD ON: HCPCS

## 2024-07-19 PROCEDURE — 80053 COMPREHEN METABOLIC PANEL: CPT

## 2024-07-19 PROCEDURE — 84443 ASSAY THYROID STIM HORMONE: CPT

## 2024-07-19 RX ORDER — ALBUTEROL SULFATE 90 UG/1
2 AEROSOL, METERED RESPIRATORY (INHALATION) 4 TIMES DAILY
Qty: 18 G | Refills: 0 | Status: SHIPPED | OUTPATIENT
Start: 2024-07-19

## 2024-07-19 RX ADMIN — DARATUMUMAB AND HYALURONIDASE-FIHJ (HUMAN RECOMBINANT) 1800 MG: 1800; 30000 INJECTION SUBCUTANEOUS at 11:33

## 2024-07-19 ASSESSMENT — PAIN SCALES - GENERAL: PAINLEVEL: MODERATE PAIN (4)

## 2024-07-19 NOTE — PROGRESS NOTES
Infusion Nursing Note:  June Ronquillo presents today for Cycle 33 Day 1 Darzalex Faspro.    Patient seen by provider today: Yes: Ana Cristina Cleveland CNP   present during visit today: Not Applicable.    Note: Patient presents to the infusion center today after his provider appt.    Per Ana Cristina Cleveland CNP no IVFs needed today.    Intravenous Access:  No Intravenous access at this visit.    Treatment Conditions:   Latest Reference Range & Units 07/19/24 10:01   Sodium 135 - 145 mmol/L 139   Potassium 3.4 - 5.3 mmol/L 4.6   Chloride 98 - 107 mmol/L 109 (H)   Carbon Dioxide (CO2) 22 - 29 mmol/L 20 (L)   Urea Nitrogen 6.0 - 20.0 mg/dL 16.9   Creatinine 0.67 - 1.17 mg/dL 1.13   GFR Estimate >60 mL/min/1.73m2 76   Calcium 8.8 - 10.4 mg/dL 8.9   Anion Gap 7 - 15 mmol/L 10   Albumin 3.5 - 5.2 g/dL 4.2   Protein Total 6.4 - 8.3 g/dL 6.1 (L)   Alkaline Phosphatase 40 - 150 U/L 66   ALT 0 - 70 U/L 20   AST 0 - 45 U/L 36   Bilirubin Total <=1.2 mg/dL 0.4   Glucose 70 - 99 mg/dL 155 (H)   WBC 4.0 - 11.0 10e3/uL 4.5   Hemoglobin 13.3 - 17.7 g/dL 11.5 (L)   Hematocrit 40.0 - 53.0 % 35.7 (L)   Platelet Count 150 - 450 10e3/uL 417   RBC Count 4.40 - 5.90 10e6/uL 4.23 (L)   MCV 78 - 100 fL 84   MCH 26.5 - 33.0 pg 27.2   MCHC 31.5 - 36.5 g/dL 32.2   RDW 10.0 - 15.0 % 16.5 (H)   % Neutrophils % 51   % Lymphocytes % 29   % Monocytes % 13   % Eosinophils % 7   % Basophils % 0   Absolute Basophils 0.0 - 0.2 10e3/uL 0.0   Absolute Eosinophils 0.0 - 0.7 10e3/uL 0.3   Absolute Immature Granulocytes <=0.4 10e3/uL 0.0   Absolute Lymphocytes 0.8 - 5.3 10e3/uL 1.3   Absolute Monocytes 0.0 - 1.3 10e3/uL 0.6   % Immature Granulocytes % 0   Absolute Neutrophils 1.6 - 8.3 10e3/uL 2.2   Absolute NRBCs 10e3/uL 0.0   NRBCs per 100 WBC <1 /100 0     Results reviewed, labs MET treatment parameters, ok to proceed with treatment.    Post Infusion Assessment:  Patient tolerated injection in left lower abd without incident.  Site patent and intact,  free from redness, edema or discomfort.     Discharge Plan:   Patient declined prescription refills.  Discharge instructions reviewed with: Patient.  Patient and/or family verbalized understanding of discharge instructions and all questions answered.  AVS to patient via [employer].  Patient will return 8/16 for next appointment.   Patient discharged in stable condition accompanied by: self.  Departure Mode: Ambulatory.      Lina Bloom RN

## 2024-07-19 NOTE — NURSING NOTE
"Oncology Rooming Note    July 19, 2024 10:17 AM   June Ronquillo is a 54 year old male who presents for:    Chief Complaint   Patient presents with    Labs Only     Venipuncture, vitals checked    Oncology Clinic Visit     RTN for MM     Initial Vitals: /82   Pulse 87   Temp 98  F (36.7  C)   Resp 16   Wt 71.3 kg (157 lb 3.2 oz)   SpO2 99%   BMI 26.57 kg/m   Estimated body mass index is 26.57 kg/m  as calculated from the following:    Height as of 2/28/24: 1.638 m (5' 4.5\").    Weight as of this encounter: 71.3 kg (157 lb 3.2 oz). Body surface area is 1.8 meters squared.  Moderate Pain (4) Comment: Data Unavailable   No LMP for male patient.  Allergies reviewed: Yes  Medications reviewed: Yes    Medications: MEDICATION REFILLS NEEDED TODAY. Provider was notified.  Pharmacy name entered into Roomixer:    [facility] [place], [place] - [address]  [facility] [place], [place] - [address] [facility] [place], [place] - [address]  [facility] [place], [place] - [address] [facility]  [facility] - [place], [place] - [address]    Frailty Screening:   Is the patient here for a new oncology consult visit in cancer care? 2. No      Clinical concerns:Left shoulder  --muscle pain      Liana Obrien MA             "

## 2024-07-19 NOTE — LETTER
7/19/2024      June Ronquillo  [address]  [facility] [address]      Dear Colleague,    Thank you for referring your patient, June Ronquillo, to the [facility]. Please see a copy of my visit note below.    [facility]  Date of visit: 07/19/2024        Oncologic History  - 4/30/2021 M spike of 34.8 in urine.M spike in blood 0.2; IgG 702 with depressed IgA and IgM. Beta 2 microglobulin 2.7. Lambda  with K/L ratio of 0.01. WBC 11.1 with absolute eos of 1.0. hgb, plt, Cr (1.1), and albumin WNL.    -4/30/2021 CT abd/pelvis showed sclerotic marrow changes with numerous lytic appearing lesions throughout the imaged portions of the spine, pelvis and ribs.      -PET scan 5/11/2021 Numerous osteolytic lesions which predominantly demonstrate uptake below liver background levels. There is one intraosseous lesion in the left lateral 9th rib that demonstrates uptake above background, possibly due to nondisplaced fracture. Adjacent to this lesion is mild hypermetabolism to the left pleura, with new small left pleural effusion, suspicious for malignant effusion versus posttraumatic effusion. Pleural uptake may represent extramedullary myeloma extending along the intercostal space versus inflammation.    - BM bx 5/17/2021 with flow showing 4.0% plasma cells which express CD19 (dim), CD38 (bright), CD45 (dim), , and monotypic cytoplasmic lambda immunoglobulin light chains but lack CD20 and CD56.  Hypercellular bone marrow for age (approximately 75% cellularity) with adequate trilineage hematopoiesis. Plasma cell infiltrate: Interstitial, lambda monotypic and representing approximately 60% the bone marrow cellularity, by  immunohistochemical stain. Cytpgenetics with 2 related hypodiploid clones. One with loss of Y, monosomy 13 and 14 (5%) and one with translocation of 8p and 14, derivative 16 with long arm replaced by extra copy  1q,monosomy 21. FISH showed gain of 1q, loss of 13 and 14, IGH-MYC fusion t(8:14)    - Started Miracle-RVd 21 day with velcade on days 1,8,15.     -Cycle 2 Miracle-RVd. Dose reduce dex to 80 mg d/t fatigue and stomach upset on dex days. Also having cough with basilar streaking on CXR    - 8/31/2021 BM bx -rare suspicious plasma cells in touch imprint. No increase in plasma cells by morph.    -8/31/2021 PET/CT Diffuse osteolytic lesions throughout the axial and appendicular skeleton. Increased sclerosis since prior exam 5/11/2021 without increased metabolism or size.  Hypermetabolic pretracheal lymph node as well as hypermetabolic level 2 lymph nodes within the right neck. These lymph nodes are likely reactive from autoimmune activation via medical therapy. Hypermetabolic subcentimeter nodules within the thyroid gland    - 11/11/2021 Autologous BMT    - 2/21/22 started maintenance with Revlimid 10 mg and daratumumab monthly; did Revlimid alone for C1 due to low IgG levels, added daratumumab starting C2.  Initially did Revlimid daily, decreased to 21/28 days in Nov 2023 to help minimize side effects    Interval Hx:   Logan presents to clinic today for toxicity check prior to C32D1.  He is overall doing well other than nasal congestion since he traveled.  His energy levels and appetite are good with no concerns.  He is no longer having pressure with the R eye and resolved with antibiotics.  Denies fevers/chills, night sweats, SOB/cough, chest pain, rashes/sores, bleeding issues, new pains, all other acute issues or concerns.    Physical Exam:  /82   Pulse 87   Temp 98  F (36.7  C)   Resp 16   Wt 71.3 kg (157 lb 3.2 oz)   SpO2 99%   BMI 26.57 kg/m      Wt Readings from Last 5 Encounters:   07/19/24 71.3 kg (157 lb 3.2 oz)   06/25/24 71.6 kg (157 lb 12.8 oz)   05/24/24 71 kg (156 lb 9.6 oz)   05/14/24 70.3 kg (155 lb)   04/26/24 69 kg (152 lb 3.2 oz)     Constitutional: Appears stated age, well-groomed, sounds  "congested  HEENT: Normocephilic. EOMI, PERRL. Sclerae are anicteric.   Respiratory: No increased work of breathing, good air exchange  Cardiovascular: No edema. Regular rate and rhythym  GI: +BS. Soft. No tenderness on palpation.  Skin: No bruising or bleeding, normal skin color, texture, turgor, no redness, warmth, or swelling, no rashes, no lesions, no jaundice.  Neurologic: Awake, alert, oriented to name, place and time.    Vascular access: PIV      Allergies   Allergen Reactions     Blood Transfusion Related (Informational Only) Other (See Comments)     Patient has a history of a clinically significant antibody against RBC antigens.  A delay in compatible RBCs may occur.      Current Outpatient Medications   Medication Sig Dispense Refill     albuterol (PROAIR HFA/PROVENTIL HFA/VENTOLIN HFA) 108 (90 Base) MCG/ACT inhaler Inhale 2 puffs into the lungs 4 times daily 18 g 0     aspirin (ASA) 81 MG EC tablet Take 1 tablet (81 mg) by mouth daily       calcium carbonate-vitamin D (OSCAL) 500-5 MG-MCG tablet TAKE ONE TABLET BY MOUTH ONCE DAILY 90 tablet 3     LENalidomide (REVLIMID) 10 MG CAPS capsule Take 1 capsule (10 mg) by mouth daily 21 capsule 0     levothyroxine (SYNTHROID/LEVOTHROID) 125 MCG tablet Take 1 tablet (125 mcg) by mouth daily 90 tablet 1     Needle, Disp, (B-D BLUNT FILL NEEDLE) 18G X 1-1/2\" MISC For use with testosterone injection: Disposable syringe and needles, use one 18 G needle to draw up medication then switch to 23 G injection needle 25 each 1     Needle, Disp, (BD DISP NEEDLE) 23G X 1\" MISC For use with testosterone injection: Disposable syringe and needles, use one 18 G needle to draw up medication then switch to 23 G injection needle 25 each 1     potassium chloride carson ER (KLOR-CON M10) 10 MEQ CR tablet TAKE TWO TABLETS BY MOUTH  DAILY 60 tablet 3     rosuvastatin (CRESTOR) 40 MG tablet Take 1 tablet (40 mg) by mouth daily 90 tablet 3     testosterone cypionate (DEPOTESTOSTERONE) 200 " MG/ML injection Inject 0.75 mLs (150 mg) into the muscle every 14 days 10 mL 0     vitamin C with B complex (B COMPLEX-C) tablet TAKE ONE TABLET BY MOUTH ONCE DAILY 90 tablet 3     acyclovir (ZOVIRAX) 400 MG tablet Take 1 tablet (400 mg) by mouth 2 times daily (Patient not taking: Reported on 6/25/2024) 60 tablet 3     LENalidomide (REVLIMID) 10 MG CAPS capsule Take 1 capsule (10 mg) by mouth daily (Patient not taking: Reported on 7/19/2024) 21 capsule 0     LENalidomide (REVLIMID) 10 MG CAPS capsule Take 1 capsule (10 mg) by mouth daily (Patient not taking: Reported on 6/25/2024) 21 capsule 0     syringe, disposable, 1 ML MISC For use with testosterone injection: Disposable syringe and 2 needles, use one 18 G needle to draw up medication then switch to 23 G injection needle 25 each 1     No current facility-administered medications for this visit.     Most Recent 3 CBC's:  Recent Labs   Lab Test 07/19/24  1001 06/25/24  1505 05/24/24  1528   WBC 4.5 7.7 4.9   HGB 11.5* 11.5* 11.6*   MCV 84 85 86    467* 420   ANEUTAUTO 2.2 5.4 2.4     Most Recent 3 BMP's:  Recent Labs   Lab Test 07/19/24  1001 06/25/24  1505 05/24/24  1528    138 137   POTASSIUM 4.6 3.9 3.9   CHLORIDE 109* 104 104   CO2 20* 23 23   BUN 16.9 12.3 18.8   CR 1.13 1.09 1.27*   ANIONGAP 10 11 10   HARDEEP 8.9 9.0 8.9   * 119* 121*   PROTTOTAL 6.1* 6.3* 6.0*   ALBUMIN 4.2 4.1 4.2    Most Recent 3 LFT's:  Recent Labs   Lab Test 07/19/24  1001 06/25/24  1505 05/24/24  1528   AST 36 26 24   ALT 20 14 14   ALKPHOS 66 64 59   BILITOTAL 0.4 0.3 0.4    Most Recent 2 TSH and T4:  Recent Labs   Lab Test 01/08/24  0713 06/26/23  1442 12/19/22  1103 06/17/22  1332 11/14/21  1103 10/20/21  1145   TSH 0.86 0.58   < > 0.07*   < > 0.70   T4  --   --   --  1.36  --  0.97    < > = values in this interval not displayed.      Latest Reference Range & Units 06/25/24 15:05   Albumin Fraction 3.7 - 5.1 g/dL 3.8   Alpha 1 Fraction 0.2 - 0.4 g/dL 0.3   Alpha 2  Fraction 0.5 - 0.9 g/dL 0.6   Beta Fraction 0.6 - 1.0 g/dL 0.7   ELP Interpretation:  Very small monoclonal protein (0.1 g/dL) seen in the gamma fraction. See immunofixation report on same specimen. Hypogammaglobulinemia. Pathologic significance requires clinical correlation. Nunu Barber M.D., Ph.D.   Gamma Fraction 0.7 - 1.6 g/dL 0.4 (L)   IGA 84 - 499 mg/dL 43 (L)    - 1,616 mg/dL 444 (L)   IGM 35 - 242 mg/dL 22 (L)   Immunofixation ELP  Small monoclonal IgG immunoglobulin of kappa light chain type. Monoclonal antibody therapeutics (e.g. Daratumumab) may appear as monoclonal proteins on serum electrophoresis and immunofixation. Results should be interpreted with caution. Pathologic significance requires clinical correlation.  Nunu Barber M.D., Ph.D.   Kappa Free Lt Chain 0.33 - 1.94 mg/dL 0.63   Kappa Lambda Ratio 0.26 - 1.65  1.03   Lambda Free Lt Chain 0.57 - 2.63 mg/dL 0.61   Monoclonal Peak <=0.0 g/dL 0.1 (H)   Total Protein Serum for ELP 6.4 - 8.3 g/dL 5.9 (L)   (L): Data is abnormally low  (H): Data is abnormally high  I reviewed the above labs today.    Assessment and Plan  Multiple myeloma with high risk cytogenetics  - Miracle-RVD with VGPR, then underwent autologous transplant 11/11/21.   - Given his high risk cytogenetics he started miracle + revlimid maintenance therapy 2/21/22--- proceed with C32D1 today (7/19)  - Completed 2 years of zometa in Sept 2023. Continue Vit D.   - Revlimid was decreased to 21/28 days in Nov 2023 to try to minimize side effects (dysgeusia, flu-like symptoms on days following daratumumab)  - He is currently tolerating treatment well without side-effects.     Ppx/ID  - Continue acyclovir daily for viral ppx   - Received influenza vaccine 12/15/23  - COVID + 10/19/23.  Received COVID booster 02/02/24   - Pneumococcal vaccine given 02/02/24   - Got first Shingrix vaccine 3/4/24  - Hep A vaccine given 5/14/24--to be repeated in 12 weeks  - MMR vaccine  5/14/24    Sinusitis:  Received Augmentin 6/28 for sinus infection with resolution of symptoms.    - 7/13 developed nasal congestion after traveling to [facility]  - 7/19 Covid swab and RVP    Hypogammaglobulinemia   Start monthly IV Ig if IgG <300  - 6/25/24 IgG 444  - If continues to have repeat URI could consider raising parameters for IVIG    Normocytic Anemia:   - Likely d/t revlimid and daratumumab.   - Will keep monitoring and dose reduce if needed.    - Hgb stable at this time       The longitudinal plan of care for the diagnosis(es)/condition(s) as documented were addressed during this visit. Due to the added complexity in care, I will continue to support [place] in the subsequent management and with ongoing continuity of care.     PATTY Jamil CNP         Again, thank you for allowing me to participate in the care of your patient.        Sincerely,        PATTY Jamil CNP

## 2024-07-19 NOTE — PATIENT INSTRUCTIONS
[facility] Triage and after hours / weekends / holidays:  [phone]    Please call the [facility] Triage line if you experience a temperature greater than or equal to 100.4, shaking chills, have uncontrolled nausea, vomiting and/or diarrhea, dizziness, shortness of breath, chest pain, bleeding, unexplained bruising, or if you have any other new/concerning symptoms, questions or concerns.     If you wish to speak to a provider before your next infusion visit, please call your care coordinator to notify them so we can adequately serve you.    If you need a refill on a narcotic prescription or other medication, please call before your infusion appointment.      July 2024 Sunday Monday Tuesday Wednesday Thursday Friday Saturday        1     2     3     4     5     6       7     8     9     10     11     12     13       14     15     16     17     18     19    LAB PERIPHERAL   9:30 AM   (15 min.)   [facility] DRAW   [facility]    RETURN CCSL   9:45 AM   (45 min.)   Ana Cristina Cleveland APRN CNP   [facility]    ONC INFUSION 1 HR (60 MIN)  12:00 PM   (60 min.)    ONC INFUSION NURSE   [facility] 20       21     22     23     24     25     26    LAB  10:30 AM   (15 min.)    LAB   [facility] [place] 27       28     29     30     31                                August 2024 Sunday Monday Tuesday Wednesday Thursday Friday Saturday                       1     2     3       4     5     6     7     8     9     10       11     12     13     14     15     16    LAB PERIPHERAL   2:00 PM   (15 min.)   [facility] DRAW   [facility]    RETURN CCSL   2:15 PM   (45 min.)   Laura Ballesteros APRN CNP   [facility]    ONC INFUSION 1 HR (60 MIN)   3:00 PM   (60 min.)    ONC INFUSION NURSE   [facility] 17       18     19     20     21      22     23     24       25     26     27     28     29     30     31                    Recent Results (from the past 24 hour(s))   TSH with free T4 reflex    Collection Time: 07/19/24 10:01 AM   Result Value Ref Range    TSH 0.05 (L) 0.30 - 4.20 uIU/mL   Comprehensive metabolic panel    Collection Time: 07/19/24 10:01 AM   Result Value Ref Range    Sodium 139 135 - 145 mmol/L    Potassium 4.6 3.4 - 5.3 mmol/L    Carbon Dioxide (CO2) 20 (L) 22 - 29 mmol/L    Anion Gap 10 7 - 15 mmol/L    Urea Nitrogen 16.9 6.0 - 20.0 mg/dL    Creatinine 1.13 0.67 - 1.17 mg/dL    GFR Estimate 76 >60 mL/min/1.73m2    Calcium 8.9 8.8 - 10.4 mg/dL    Chloride 109 (H) 98 - 107 mmol/L    Glucose 155 (H) 70 - 99 mg/dL    Alkaline Phosphatase 66 40 - 150 U/L    AST 36 0 - 45 U/L    ALT 20 0 - 70 U/L    Protein Total 6.1 (L) 6.4 - 8.3 g/dL    Albumin 4.2 3.5 - 5.2 g/dL    Bilirubin Total 0.4 <=1.2 mg/dL   CBC with platelets and differential    Collection Time: 07/19/24 10:01 AM   Result Value Ref Range    WBC Count 4.5 4.0 - 11.0 10e3/uL    RBC Count 4.23 (L) 4.40 - 5.90 10e6/uL    Hemoglobin 11.5 (L) 13.3 - 17.7 g/dL    Hematocrit 35.7 (L) 40.0 - 53.0 %    MCV 84 78 - 100 fL    MCH 27.2 26.5 - 33.0 pg    MCHC 32.2 31.5 - 36.5 g/dL    RDW 16.5 (H) 10.0 - 15.0 %    Platelet Count 417 150 - 450 10e3/uL    % Neutrophils 51 %    % Lymphocytes 29 %    % Monocytes 13 %    % Eosinophils 7 %    % Basophils 0 %    % Immature Granulocytes 0 %    NRBCs per 100 WBC 0 <1 /100    Absolute Neutrophils 2.2 1.6 - 8.3 10e3/uL    Absolute Lymphocytes 1.3 0.8 - 5.3 10e3/uL    Absolute Monocytes 0.6 0.0 - 1.3 10e3/uL    Absolute Eosinophils 0.3 0.0 - 0.7 10e3/uL    Absolute Basophils 0.0 0.0 - 0.2 10e3/uL    Absolute Immature Granulocytes 0.0 <=0.4 10e3/uL    Absolute NRBCs 0.0 10e3/uL   Total Protein, Serum for ELP    Collection Time: 07/19/24 10:01 AM   Result Value Ref Range    Total Protein Serum for ELP 5.8 (L) 6.4 - 8.3 g/dL   T4 free    Collection Time:  07/19/24 10:01 AM   Result Value Ref Range    Free T4 1.20 0.90 - 1.70 ng/dL

## 2024-07-19 NOTE — NURSING NOTE
Chief Complaint   Patient presents with    Labs Only     Venipuncture, vitals checked     Alyce Gibbs RN on 7/19/2024 at 10:03 AM

## 2024-07-20 ENCOUNTER — TELEPHONE (OUTPATIENT)
Dept: NURSING | Facility: CLINIC | Age: 54
End: 2024-07-20
Payer: COMMERCIAL

## 2024-07-20 NOTE — TELEPHONE ENCOUNTER
Coronavirus (COVID-19) Notification    Caller Name (Patient, parent, daughter/son, grandparent, etc)  Patient    Reason for call  Notify of Positive Coronavirus (COVID-19) lab results, assess symptoms,  review [facility] recommendations    Lab Result    Lab test:  2019-nCoV rRt-PCR or SARS-CoV-2 PCR    Oropharyngeal AND/OR nasopharyngeal swabs is POSITIVE for 2019-nCoV RNA/SARS-COV-2 PCR (COVID-19 virus)      Gather patient reported symptoms   Assessment   Current Symptoms at time of phone call, reported by patient: (if no symptoms, document: No symptoms] Yes   Date of symptom(s) onset (if applicable) 07/14/24     If at time of call, Patients symptoms have worsened, the Patient should contact 911 or have someone drive them to Emergency Dept promptly:    If Patient calling 911, inform 911 personal that you have tested positive for the Coronavirus (COVID-19).  Place mask on and await 911 to arrive.  If Emergency Dept, If possible, please have another adult drive you to the Emergency Dept but you need to wear mask when in contact with other people.      Treatment Options:   Is patient interested in discussing COVID treatment? No.        Review information with Patient    Your result was positive. This means you have COVID-19 (coronavirus).    How can I protect others?    These guidelines are for isolating before returning to work, school or .    If you DO have symptoms  Stay home and away from others   For at least 5 days after your symptoms started, AND  You are fever free for 24 hours (with no medicine that reduces fever), AND  Your other symptoms are better  Wear a mask for 10 full days anytime you are around others    If you DON'T have symptoms  Stay home and away from others for at least 5 days after your positive test  Wear a mask for 10 full days anytime you are around others    There may be different guidelines for healthcare facilities.  Please check with the specific sites before arriving.    If  you have been told by a doctor that you were severely ill with COVID-19 or are immunocompromised, you should isolate for at least 10 days.    You should not go back to work until you meet the guidelines above for ending your home isolation. You don't need to be retested for COVID-19 before going back to work--studies show that you won't spread the virus if it's been at least 10 days since your symptoms started (or 20 days, if you have a weak immune system).    Employers, schools, and daycares: This is an official notice for this person's medical guidelines for returning in-person.  They must meet the above guidelines before going back to work, school or  in person.    You will receive a positive COVID-19 letter via [employer] or the mail soon with additional self-care information.    Would you like me to review some of that information with you now?  Yes    How can I take care of myself?    Get lots of rest. Drink extra fluids (unless a doctor has told you not to).    Take Tylenol (acetaminophen) for fever or pain. If you have liver or kidney problems, ask your family doctor if it's okay to take Tylenol.     Take either:   650 mg (two 325 mg pills) every 4 to 6 hours, or   1,000 mg (two 500 mg pills) every 8 hours as needed.   Note: Do not take more than 3,000 mg in one day. Acetaminophen is found in many medicines (both prescribed and over-the-counter medicines). Read all labels to be sure you don't take too much.    For children, check the Tylenol bottle for the right dose (based on their age or weight).    If you have other health problems (like cancer, heart failure, an organ transplant or severe kidney disease): Call your specialty clinic if you don't feel better in the next 2 days.    Know when to call 911: Emergency warning signs include:  Trouble breathing or shortness of breath  Pain or pressure in the chest that doesn't go away  Feeling confused like you haven't felt before, or not being able to wake  up  Bluish-colored lips or face      If you were tested for an upcoming procedure, please contact your provider for next steps.    Toya Sanchez

## 2024-07-22 LAB
ALBUMIN SERPL ELPH-MCNC: 3.8 G/DL (ref 3.7–5.1)
ALPHA1 GLOB SERPL ELPH-MCNC: 0.3 G/DL (ref 0.2–0.4)
ALPHA2 GLOB SERPL ELPH-MCNC: 0.5 G/DL (ref 0.5–0.9)
B-GLOBULIN SERPL ELPH-MCNC: 0.7 G/DL (ref 0.6–1)
GAMMA GLOB SERPL ELPH-MCNC: 0.4 G/DL (ref 0.7–1.6)
LOCATION OF TASK: ABNORMAL
LOCATION OF TASK: NORMAL
M PROTEIN SERPL ELPH-MCNC: 0.1 G/DL
PROT PATTERN SERPL ELPH-IMP: ABNORMAL
PROT PATTERN SERPL IFE-IMP: NORMAL

## 2024-07-25 LAB — SCANNED LAB RESULT: NORMAL

## 2024-07-26 DIAGNOSIS — C90.00 MULTIPLE MYELOMA NOT HAVING ACHIEVED REMISSION (H): Primary | ICD-10-CM

## 2024-07-26 RX ORDER — LENALIDOMIDE 10 MG/1
10 CAPSULE ORAL DAILY
Qty: 21 CAPSULE | Refills: 0 | Status: SHIPPED | OUTPATIENT
Start: 2024-08-16 | End: 2024-09-27

## 2024-08-01 ENCOUNTER — TELEPHONE (OUTPATIENT)
Dept: ONCOLOGY | Facility: CLINIC | Age: 54
End: 2024-08-01
Payer: COMMERCIAL

## 2024-08-01 DIAGNOSIS — C90.00 MULTIPLE MYELOMA NOT HAVING ACHIEVED REMISSION (H): ICD-10-CM

## 2024-08-01 RX ORDER — LENALIDOMIDE 10 MG/1
CAPSULE ORAL
Refills: 0 | OUTPATIENT
Start: 2024-08-01

## 2024-08-01 NOTE — TELEPHONE ENCOUNTER
Oral Chemotherapy Monitoring Program   Medication: Revlimid  Rx: 10mg PO daily on days 1 through 21 of 28 day cycle   Auth #: [id number]   Risk Category: Adult Male  Routine survey questions reviewed.   Rx to be Escribed to [facility]    Yu Goldberg  [facility]  Oncology Pharmacy Liaison   [email or web address]  [phone] (phone)  [phone] (fax)

## 2024-08-05 DIAGNOSIS — R79.89 LOW TESTOSTERONE IN MALE: ICD-10-CM

## 2024-08-05 DIAGNOSIS — E03.9 HYPOTHYROIDISM, UNSPECIFIED TYPE: ICD-10-CM

## 2024-08-05 DIAGNOSIS — R73.9 HYPERGLYCEMIA: ICD-10-CM

## 2024-08-05 DIAGNOSIS — E55.9 VITAMIN D INSUFFICIENCY: ICD-10-CM

## 2024-08-05 DIAGNOSIS — C73 PAPILLARY MICROCARCINOMA OF THYROID (H): Primary | ICD-10-CM

## 2024-08-05 RX ORDER — LEVOTHYROXINE SODIUM 112 UG/1
112 TABLET ORAL DAILY
Qty: 90 TABLET | Refills: 1 | Status: SHIPPED | OUTPATIENT
Start: 2024-08-05

## 2024-08-12 ENCOUNTER — MYC REFILL (OUTPATIENT)
Dept: ENDOCRINOLOGY | Facility: CLINIC | Age: 54
End: 2024-08-12
Payer: COMMERCIAL

## 2024-08-12 DIAGNOSIS — R79.89 LOW TESTOSTERONE IN MALE: ICD-10-CM

## 2024-08-12 RX ORDER — TESTOSTERONE CYPIONATE 200 MG/ML
150 INJECTION, SOLUTION INTRAMUSCULAR
Qty: 10 ML | Refills: 0 | Status: SHIPPED | OUTPATIENT
Start: 2024-08-12

## 2024-08-12 NOTE — TELEPHONE ENCOUNTER
8/23/24:  Your starting dose will be 150 mg injected every 2 weeks.     LOV 1/24/24  FOV 12/11/24  1. Elevated gonadotropins. 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 Normal testicular ultrasound. On testosterone, with improvement in hypogonadal symptoms and normal testosterone on previsit labs. CBC shows anemia, no erythrocytosis. PSA is in normal range. Continue current regimen without changes.     Requested Prescriptions   Pending Prescriptions Disp Refills    testosterone cypionate (DEPOTESTOSTERONE) 200 MG/ML injection 10 mL 0     Sig: Inject 0.75 mLs (150 mg) into the muscle every 14 days       Androgen Agents Failed - 8/12/2024 10:40 AM        Failed - Lipid panel on file in past 12 mos     Recent Labs   Lab Test 06/23/23  0952   CHOL 116   TRIG 100   HDL 40   LDL 56   NHDL 76               Failed - Refills for this classification require provider review        Failed - Recent (6 mo) or future (30 days) visit within the authorizing provider's specialty        Passed - Patient is of age 12 and older        Passed - ALT on file within past 12 mos     Recent Labs   Lab Test 07/19/24  1001   ALT 20             Passed - Medication is active on med list        Passed - HCT less than 54% on file within past 12 mos     Recent Labs   Lab Test 07/19/24  1001   HCT 35.7*             Passed - Serum Testosterone on file within past 12 mos     Recent Labs   Lab Test 01/08/24  0713   TESTOSTTOTAL 623             Passed - Serum PSA on file within past 12 mos     Lab Results   Component Value Date    PSA 1.26 01/08/2024    PSA 0.45 04/30/2021             Passed - Blood pressure under 140/90 in past 6 months     BP Readings from Last 3 Encounters:   07/19/24 123/82   06/25/24 (!) 159/84   06/25/24 (!) 147/84       Systolic (Patient Reported): 118 (10/26/2023 10:20 AM)  Diastolic (Patient Reported):  77 (10/26/2023 10:20 AM)              Passed - Patient is not pregnant        Passed - No positive pregnancy test on file within past 12 mos        Passed - AST on file within past 12 mos     Recent Labs   Lab Test 07/19/24  1001   AST 36

## 2024-08-15 ENCOUNTER — PATIENT OUTREACH (OUTPATIENT)
Dept: ONCOLOGY | Facility: CLINIC | Age: 54
End: 2024-08-15
Payer: COMMERCIAL

## 2024-08-15 NOTE — PROGRESS NOTES
[facility]: Cancer Care                                                                                          Message left for Logan to check in on how he is feeling.  He was informed that PATTY Landaverde, CNP will be out of the office tomorrow and unable to see him prior to treatment.      He calls back and reports that he has been symptomatic with congestion and a dry cough.  He is afebrile.  He is working till 8pm and will be able to take a home COVID test this evening.    He will call back with the results by Friday morning. Laura advises that if he is COVID negative he may proceed with treatment that day.    Signature:  Christina Zarate RN

## 2024-08-16 ENCOUNTER — APPOINTMENT (OUTPATIENT)
Dept: LAB | Facility: CLINIC | Age: 54
End: 2024-08-16
Attending: REGISTERED NURSE
Payer: COMMERCIAL

## 2024-08-16 ENCOUNTER — INFUSION THERAPY VISIT (OUTPATIENT)
Dept: ONCOLOGY | Facility: CLINIC | Age: 54
End: 2024-08-16
Attending: REGISTERED NURSE
Payer: COMMERCIAL

## 2024-08-16 ENCOUNTER — PATIENT OUTREACH (OUTPATIENT)
Dept: ONCOLOGY | Facility: CLINIC | Age: 54
End: 2024-08-16
Payer: COMMERCIAL

## 2024-08-16 VITALS
DIASTOLIC BLOOD PRESSURE: 79 MMHG | HEART RATE: 79 BPM | WEIGHT: 158.7 LBS | BODY MASS INDEX: 26.82 KG/M2 | SYSTOLIC BLOOD PRESSURE: 155 MMHG | OXYGEN SATURATION: 99 % | RESPIRATION RATE: 18 BRPM | TEMPERATURE: 98 F

## 2024-08-16 DIAGNOSIS — C90.00 MULTIPLE MYELOMA NOT HAVING ACHIEVED REMISSION (H): Primary | ICD-10-CM

## 2024-08-16 LAB
ALBUMIN SERPL BCG-MCNC: 4.2 G/DL (ref 3.5–5.2)
ALP SERPL-CCNC: 76 U/L (ref 40–150)
ALT SERPL W P-5'-P-CCNC: 15 U/L (ref 0–70)
ANION GAP SERPL CALCULATED.3IONS-SCNC: 10 MMOL/L (ref 7–15)
AST SERPL W P-5'-P-CCNC: 25 U/L (ref 0–45)
BASOPHILS # BLD AUTO: 0.1 10E3/UL (ref 0–0.2)
BASOPHILS NFR BLD AUTO: 1 %
BILIRUB SERPL-MCNC: 0.3 MG/DL
BUN SERPL-MCNC: 18.1 MG/DL (ref 6–20)
CALCIUM SERPL-MCNC: 9.1 MG/DL (ref 8.8–10.4)
CHLORIDE SERPL-SCNC: 105 MMOL/L (ref 98–107)
CREAT SERPL-MCNC: 1.06 MG/DL (ref 0.67–1.17)
EGFRCR SERPLBLD CKD-EPI 2021: 82 ML/MIN/1.73M2
EOSINOPHIL # BLD AUTO: 0.8 10E3/UL (ref 0–0.7)
EOSINOPHIL NFR BLD AUTO: 11 %
ERYTHROCYTE [DISTWIDTH] IN BLOOD BY AUTOMATED COUNT: 16.6 % (ref 10–15)
GLUCOSE SERPL-MCNC: 107 MG/DL (ref 70–99)
HCO3 SERPL-SCNC: 22 MMOL/L (ref 22–29)
HCT VFR BLD AUTO: 36.8 % (ref 40–53)
HGB BLD-MCNC: 11.7 G/DL (ref 13.3–17.7)
IMM GRANULOCYTES # BLD: 0 10E3/UL
IMM GRANULOCYTES NFR BLD: 0 %
LYMPHOCYTES # BLD AUTO: 1.9 10E3/UL (ref 0.8–5.3)
LYMPHOCYTES NFR BLD AUTO: 26 %
MCH RBC QN AUTO: 26.1 PG (ref 26.5–33)
MCHC RBC AUTO-ENTMCNC: 31.8 G/DL (ref 31.5–36.5)
MCV RBC AUTO: 82 FL (ref 78–100)
MONOCYTES # BLD AUTO: 1.1 10E3/UL (ref 0–1.3)
MONOCYTES NFR BLD AUTO: 15 %
NEUTROPHILS # BLD AUTO: 3.5 10E3/UL (ref 1.6–8.3)
NEUTROPHILS NFR BLD AUTO: 47 %
NRBC # BLD AUTO: 0 10E3/UL
NRBC BLD AUTO-RTO: 0 /100
PLATELET # BLD AUTO: 427 10E3/UL (ref 150–450)
POTASSIUM SERPL-SCNC: 4 MMOL/L (ref 3.4–5.3)
PROT SERPL-MCNC: 6.3 G/DL (ref 6.4–8.3)
RBC # BLD AUTO: 4.48 10E6/UL (ref 4.4–5.9)
SODIUM SERPL-SCNC: 137 MMOL/L (ref 135–145)
TOTAL PROTEIN SERUM FOR ELP: 6.1 G/DL (ref 6.4–8.3)
WBC # BLD AUTO: 7.4 10E3/UL (ref 4–11)

## 2024-08-16 PROCEDURE — 82784 ASSAY IGA/IGD/IGG/IGM EACH: CPT

## 2024-08-16 PROCEDURE — 36415 COLL VENOUS BLD VENIPUNCTURE: CPT

## 2024-08-16 PROCEDURE — 84165 PROTEIN E-PHORESIS SERUM: CPT | Mod: 26 | Performed by: PATHOLOGY

## 2024-08-16 PROCEDURE — 86334 IMMUNOFIX E-PHORESIS SERUM: CPT | Performed by: PATHOLOGY

## 2024-08-16 PROCEDURE — 82040 ASSAY OF SERUM ALBUMIN: CPT

## 2024-08-16 PROCEDURE — 83521 IG LIGHT CHAINS FREE EACH: CPT | Mod: 59

## 2024-08-16 PROCEDURE — 84155 ASSAY OF PROTEIN SERUM: CPT | Mod: 91

## 2024-08-16 PROCEDURE — 85025 COMPLETE CBC W/AUTO DIFF WBC: CPT

## 2024-08-16 PROCEDURE — 96401 CHEMO ANTI-NEOPL SQ/IM: CPT

## 2024-08-16 PROCEDURE — 86334 IMMUNOFIX E-PHORESIS SERUM: CPT | Mod: 26 | Performed by: PATHOLOGY

## 2024-08-16 PROCEDURE — 250N000011 HC RX IP 250 OP 636: Performed by: STUDENT IN AN ORGANIZED HEALTH CARE EDUCATION/TRAINING PROGRAM

## 2024-08-16 PROCEDURE — 84165 PROTEIN E-PHORESIS SERUM: CPT | Mod: TC | Performed by: PATHOLOGY

## 2024-08-16 RX ADMIN — DARATUMUMAB AND HYALURONIDASE-FIHJ (HUMAN RECOMBINANT) 1800 MG: 1800; 30000 INJECTION SUBCUTANEOUS at 15:28

## 2024-08-16 ASSESSMENT — PAIN SCALES - GENERAL: PAINLEVEL: NO PAIN (0)

## 2024-08-16 NOTE — PROGRESS NOTES
[facility]: Cancer Care                                                                                          Call placed to Logan and he states that he called nurse triage line this morning to let them know his COVID test was negative.  He will plan on coming for labs and infusion and will see a provider next month.      Signature:  Christina Zarate RN

## 2024-08-16 NOTE — NURSING NOTE
Chief Complaint   Patient presents with    Blood Draw     Labs drawn via  by VAT in lab. VS taken.      Labs collected from venipuncture by VAT. Vital signs taken. Checked in for appointment(s).    Nunu Santoyo RN

## 2024-08-16 NOTE — PATIENT INSTRUCTIONS
[facility] Triage and after hours / weekends / holidays:  [phone]    Please call the triage or after hours line if you experience a temperature greater than or equal to 100.4, shaking chills, have uncontrolled nausea, vomiting and/or diarrhea, dizziness, shortness of breath, chest pain, bleeding, unexplained bruising, or if you have any other new/concerning symptoms, questions or concerns.      If you are having any concerning symptoms or wish to speak to a provider before your next infusion visit, please call triage to notify them so we can adequately serve you.     If you need a refill on a narcotic prescription or other medication, please call before your infusion appointment.                August 2024 Sunday Monday Tuesday Wednesday Thursday Friday Saturday                       1     2     3       4     5     6     7     8     9     10       11     12     13     14     15     16    LAB PERIPHERAL   2:15 PM   (15 min.)   [facility]   [facility]    ONC INFUSION 1 HR (60 MIN)   3:00 PM   (60 min.)    ONC INFUSION NURSE   [facility] 17       18     19     20     21     22     23     24       25     26     27     28     29     30     31                 September 2024 Sunday Monday Tuesday Wednesday Thursday Friday Saturday   1     2     3     4     5     6    RETURN CARDIOLOGY   7:35 AM   (30 min.)   Dominguez Craig MD   [facility] 7       8     9     10     11     12     13    LAB PERIPHERAL  10:30 AM   (15 min.)   [facility]RAW   [facility]    RETURN CCSL  10:45 AM   (45 min.)   Laura Ballesteros APRN CNP   [facility]    ONC INFUSION 1 HR (60 MIN)  12:00 PM   (60 min.)    ONC INFUSION NURSE   [facility] 14       15     16     17     18     19     20     21       22     23     24     25     26     27     28        29     30                                              Lab Results:  Recent Results (from the past 12 hour(s))   Comprehensive metabolic panel    Collection Time: 08/16/24  2:53 PM   Result Value Ref Range    Sodium 137 135 - 145 mmol/L    Potassium 4.0 3.4 - 5.3 mmol/L    Carbon Dioxide (CO2) 22 22 - 29 mmol/L    Anion Gap 10 7 - 15 mmol/L    Urea Nitrogen 18.1 6.0 - 20.0 mg/dL    Creatinine 1.06 0.67 - 1.17 mg/dL    GFR Estimate 82 >60 mL/min/1.73m2    Calcium 9.1 8.8 - 10.4 mg/dL    Chloride 105 98 - 107 mmol/L    Glucose 107 (H) 70 - 99 mg/dL    Alkaline Phosphatase 76 40 - 150 U/L    AST 25 0 - 45 U/L    ALT 15 0 - 70 U/L    Protein Total 6.3 (L) 6.4 - 8.3 g/dL    Albumin 4.2 3.5 - 5.2 g/dL    Bilirubin Total 0.3 <=1.2 mg/dL   CBC with platelets and differential    Collection Time: 08/16/24  2:53 PM   Result Value Ref Range    WBC Count 7.4 4.0 - 11.0 10e3/uL    RBC Count 4.48 4.40 - 5.90 10e6/uL    Hemoglobin 11.7 (L) 13.3 - 17.7 g/dL    Hematocrit 36.8 (L) 40.0 - 53.0 %    MCV 82 78 - 100 fL    MCH 26.1 (L) 26.5 - 33.0 pg    MCHC 31.8 31.5 - 36.5 g/dL    RDW 16.6 (H) 10.0 - 15.0 %    Platelet Count 427 150 - 450 10e3/uL    % Neutrophils 47 %    % Lymphocytes 26 %    % Monocytes 15 %    % Eosinophils 11 %    % Basophils 1 %    % Immature Granulocytes 0 %    NRBCs per 100 WBC 0 <1 /100    Absolute Neutrophils 3.5 1.6 - 8.3 10e3/uL    Absolute Lymphocytes 1.9 0.8 - 5.3 10e3/uL    Absolute Monocytes 1.1 0.0 - 1.3 10e3/uL    Absolute Eosinophils 0.8 (H) 0.0 - 0.7 10e3/uL    Absolute Basophils 0.1 0.0 - 0.2 10e3/uL    Absolute Immature Granulocytes 0.0 <=0.4 10e3/uL    Absolute NRBCs 0.0 10e3/uL   Total Protein, Serum for ELP    Collection Time: 08/16/24  2:53 PM   Result Value Ref Range    Total Protein Serum for ELP 6.1 (L) 6.4 - 8.3 g/dL

## 2024-08-16 NOTE — PROGRESS NOTES
Infusion Nursing Note:  June Ronquillo presents today for Cycle 34 Day 1 Darzalex Faspro.    Patient seen by provider today: No   present during visit today: Not Applicable.    Note: Pt presents to infusion feeling ok. He developed a mild, productive cough this week, but has otherwise been afebrile and denies chills, runny nose, sore throat, or SOB (see RNCC note). He tested negative for COVID at home. He denies nausea, bruising/bleeding, skin concerns, diarrhea/constipation, pain or further concerns today. He confirms he is taking his Revlimid as prescribed.    Logan reports he only needs the IVF in his treatment plan when his Creatinine is elevated and declined this today.    Treatment Conditions:  Lab Results   Component Value Date    HGB 11.7 (L) 08/16/2024    WBC 7.4 08/16/2024    ANEU 4.7 03/13/2023    ANEUTAUTO 3.5 08/16/2024     08/16/2024        Lab Results   Component Value Date     08/16/2024    POTASSIUM 4.0 08/16/2024    MAG 2.6 (H) 04/28/2023    CR 1.06 08/16/2024    HARDEEP 9.1 08/16/2024    BILITOTAL 0.3 08/16/2024    ALBUMIN 4.2 08/16/2024    ALT 15 08/16/2024    AST 25 08/16/2024     Post Infusion Assessment:  Patient tolerated Darzalex Faspro injection to RLQ without incident.     Discharge Plan:   Patient declined prescription refills.  Discharge instructions reviewed with: Patient.  Patient and/or family verbalized understanding of discharge instructions and all questions answered.  AVS to patient via [employer]. Patient will return 9/13/24 for next appointment.   Patient discharged in stable condition accompanied by: self.  Departure Mode: Ambulatory.      Zehra Ramirez RN

## 2024-08-19 LAB
IGA SERPL-MCNC: 44 MG/DL (ref 84–499)
IGG SERPL-MCNC: 507 MG/DL (ref 610–1616)
IGM SERPL-MCNC: 44 MG/DL (ref 35–242)
KAPPA LC FREE SER-MCNC: 0.62 MG/DL (ref 0.33–1.94)
KAPPA LC FREE/LAMBDA FREE SER NEPH: 0.95 {RATIO} (ref 0.26–1.65)
LAMBDA LC FREE SERPL-MCNC: 0.65 MG/DL (ref 0.57–2.63)

## 2024-08-20 LAB
ALBUMIN SERPL ELPH-MCNC: 4 G/DL (ref 3.7–5.1)
ALPHA1 GLOB SERPL ELPH-MCNC: 0.3 G/DL (ref 0.2–0.4)
ALPHA2 GLOB SERPL ELPH-MCNC: 0.6 G/DL (ref 0.5–0.9)
B-GLOBULIN SERPL ELPH-MCNC: 0.7 G/DL (ref 0.6–1)
GAMMA GLOB SERPL ELPH-MCNC: 0.5 G/DL (ref 0.7–1.6)
LOCATION OF TASK: ABNORMAL
LOCATION OF TASK: NORMAL
M PROTEIN SERPL ELPH-MCNC: 0 G/DL
PROT PATTERN SERPL ELPH-IMP: ABNORMAL
PROT PATTERN SERPL IFE-IMP: NORMAL

## 2024-08-23 DIAGNOSIS — C90.00 MULTIPLE MYELOMA NOT HAVING ACHIEVED REMISSION (H): Primary | ICD-10-CM

## 2024-08-23 RX ORDER — LENALIDOMIDE 10 MG/1
10 CAPSULE ORAL DAILY
Qty: 21 CAPSULE | Refills: 0 | Status: SHIPPED | OUTPATIENT
Start: 2024-09-13 | End: 2024-09-27

## 2024-08-28 ENCOUNTER — TELEPHONE (OUTPATIENT)
Dept: ONCOLOGY | Facility: CLINIC | Age: 54
End: 2024-08-28
Payer: COMMERCIAL

## 2024-08-28 NOTE — TELEPHONE ENCOUNTER
Oral Chemotherapy Monitoring Program   Medication: Revlimid  Rx: 10mg PO daily on days 1 through 21 of 28 day cycle   Auth #: [id number]   Risk Category: Adult Male  Routine survey questions reviewed.   Rx to be Escribed to [facility]    Yu Goldberg  [facility]  Oncology Pharmacy Liaison   [email or web address]  [phone] (phone)  [phone] (fax)

## 2024-09-03 ENCOUNTER — MYC MEDICAL ADVICE (OUTPATIENT)
Dept: CARDIOLOGY | Facility: CLINIC | Age: 54
End: 2024-09-03
Payer: COMMERCIAL

## 2024-09-03 DIAGNOSIS — E78.5 HYPERLIPIDEMIA LDL GOAL <100: Primary | ICD-10-CM

## 2024-09-03 DIAGNOSIS — I10 BENIGN ESSENTIAL HYPERTENSION: ICD-10-CM

## 2024-09-03 DIAGNOSIS — I25.10 CORONARY ARTERY DISEASE INVOLVING NATIVE CORONARY ARTERY OF NATIVE HEART WITHOUT ANGINA PECTORIS: ICD-10-CM

## 2024-09-04 ENCOUNTER — LAB (OUTPATIENT)
Dept: LAB | Facility: CLINIC | Age: 54
End: 2024-09-04
Payer: COMMERCIAL

## 2024-09-04 DIAGNOSIS — E78.5 HYPERLIPIDEMIA LDL GOAL <100: ICD-10-CM

## 2024-09-04 DIAGNOSIS — I25.10 CORONARY ARTERY DISEASE INVOLVING NATIVE CORONARY ARTERY OF NATIVE HEART WITHOUT ANGINA PECTORIS: ICD-10-CM

## 2024-09-04 DIAGNOSIS — I10 BENIGN ESSENTIAL HYPERTENSION: ICD-10-CM

## 2024-09-04 LAB
CHOLEST SERPL-MCNC: 118 MG/DL
FASTING STATUS PATIENT QL REPORTED: YES
HDLC SERPL-MCNC: 44 MG/DL
LDLC SERPL CALC-MCNC: 50 MG/DL
NONHDLC SERPL-MCNC: 74 MG/DL
TRIGL SERPL-MCNC: 118 MG/DL

## 2024-09-04 PROCEDURE — 36415 COLL VENOUS BLD VENIPUNCTURE: CPT

## 2024-09-04 PROCEDURE — 80061 LIPID PANEL: CPT

## 2024-09-13 ENCOUNTER — APPOINTMENT (OUTPATIENT)
Dept: LAB | Facility: CLINIC | Age: 54
End: 2024-09-13
Attending: REGISTERED NURSE
Payer: COMMERCIAL

## 2024-09-13 ENCOUNTER — INFUSION THERAPY VISIT (OUTPATIENT)
Dept: ONCOLOGY | Facility: CLINIC | Age: 54
End: 2024-09-13
Attending: REGISTERED NURSE
Payer: COMMERCIAL

## 2024-09-13 VITALS
RESPIRATION RATE: 16 BRPM | SYSTOLIC BLOOD PRESSURE: 148 MMHG | HEART RATE: 84 BPM | DIASTOLIC BLOOD PRESSURE: 87 MMHG | OXYGEN SATURATION: 100 % | BODY MASS INDEX: 27.29 KG/M2 | WEIGHT: 161.5 LBS | TEMPERATURE: 97.4 F

## 2024-09-13 DIAGNOSIS — C90.01 MULTIPLE MYELOMA IN REMISSION (H): Primary | ICD-10-CM

## 2024-09-13 DIAGNOSIS — C90.00 MULTIPLE MYELOMA NOT HAVING ACHIEVED REMISSION (H): Primary | ICD-10-CM

## 2024-09-13 LAB
ALBUMIN SERPL BCG-MCNC: 4.3 G/DL (ref 3.5–5.2)
ALP SERPL-CCNC: 60 U/L (ref 40–150)
ALT SERPL W P-5'-P-CCNC: 17 U/L (ref 0–70)
ANION GAP SERPL CALCULATED.3IONS-SCNC: 12 MMOL/L (ref 7–15)
AST SERPL W P-5'-P-CCNC: 32 U/L (ref 0–45)
BASOPHILS # BLD AUTO: 0 10E3/UL (ref 0–0.2)
BASOPHILS NFR BLD AUTO: 1 %
BILIRUB SERPL-MCNC: 0.4 MG/DL
BUN SERPL-MCNC: 18.2 MG/DL (ref 6–20)
CALCIUM SERPL-MCNC: 9 MG/DL (ref 8.8–10.4)
CHLORIDE SERPL-SCNC: 107 MMOL/L (ref 98–107)
CREAT SERPL-MCNC: 1.18 MG/DL (ref 0.67–1.17)
EGFRCR SERPLBLD CKD-EPI 2021: 72 ML/MIN/1.73M2
EOSINOPHIL # BLD AUTO: 0.1 10E3/UL (ref 0–0.7)
EOSINOPHIL NFR BLD AUTO: 2 %
ERYTHROCYTE [DISTWIDTH] IN BLOOD BY AUTOMATED COUNT: 17.6 % (ref 10–15)
GLUCOSE SERPL-MCNC: 138 MG/DL (ref 70–99)
HCO3 SERPL-SCNC: 20 MMOL/L (ref 22–29)
HCT VFR BLD AUTO: 37.3 % (ref 40–53)
HGB BLD-MCNC: 11.7 G/DL (ref 13.3–17.7)
IMM GRANULOCYTES # BLD: 0 10E3/UL
IMM GRANULOCYTES NFR BLD: 0 %
LYMPHOCYTES # BLD AUTO: 1.6 10E3/UL (ref 0.8–5.3)
LYMPHOCYTES NFR BLD AUTO: 34 %
MCH RBC QN AUTO: 25.7 PG (ref 26.5–33)
MCHC RBC AUTO-ENTMCNC: 31.4 G/DL (ref 31.5–36.5)
MCV RBC AUTO: 82 FL (ref 78–100)
MONOCYTES # BLD AUTO: 0.6 10E3/UL (ref 0–1.3)
MONOCYTES NFR BLD AUTO: 13 %
NEUTROPHILS # BLD AUTO: 2.4 10E3/UL (ref 1.6–8.3)
NEUTROPHILS NFR BLD AUTO: 50 %
NRBC # BLD AUTO: 0 10E3/UL
NRBC BLD AUTO-RTO: 0 /100
PLATELET # BLD AUTO: 419 10E3/UL (ref 150–450)
POTASSIUM SERPL-SCNC: 3.9 MMOL/L (ref 3.4–5.3)
PROT SERPL-MCNC: 6.1 G/DL (ref 6.4–8.3)
RBC # BLD AUTO: 4.55 10E6/UL (ref 4.4–5.9)
SODIUM SERPL-SCNC: 139 MMOL/L (ref 135–145)
TOTAL PROTEIN SERUM FOR ELP: 6 G/DL (ref 6.4–8.3)
WBC # BLD AUTO: 4.8 10E3/UL (ref 4–11)

## 2024-09-13 PROCEDURE — 250N000011 HC RX IP 250 OP 636: Performed by: REGISTERED NURSE

## 2024-09-13 PROCEDURE — 84165 PROTEIN E-PHORESIS SERUM: CPT | Mod: TC | Performed by: STUDENT IN AN ORGANIZED HEALTH CARE EDUCATION/TRAINING PROGRAM

## 2024-09-13 PROCEDURE — 36415 COLL VENOUS BLD VENIPUNCTURE: CPT

## 2024-09-13 PROCEDURE — 84165 PROTEIN E-PHORESIS SERUM: CPT | Mod: 26 | Performed by: STUDENT IN AN ORGANIZED HEALTH CARE EDUCATION/TRAINING PROGRAM

## 2024-09-13 PROCEDURE — 86334 IMMUNOFIX E-PHORESIS SERUM: CPT | Mod: 26 | Performed by: STUDENT IN AN ORGANIZED HEALTH CARE EDUCATION/TRAINING PROGRAM

## 2024-09-13 PROCEDURE — 99213 OFFICE O/P EST LOW 20 MIN: CPT | Performed by: REGISTERED NURSE

## 2024-09-13 PROCEDURE — 86334 IMMUNOFIX E-PHORESIS SERUM: CPT | Performed by: STUDENT IN AN ORGANIZED HEALTH CARE EDUCATION/TRAINING PROGRAM

## 2024-09-13 PROCEDURE — 82784 ASSAY IGA/IGD/IGG/IGM EACH: CPT

## 2024-09-13 PROCEDURE — 85025 COMPLETE CBC W/AUTO DIFF WBC: CPT

## 2024-09-13 PROCEDURE — 84155 ASSAY OF PROTEIN SERUM: CPT

## 2024-09-13 PROCEDURE — 80053 COMPREHEN METABOLIC PANEL: CPT

## 2024-09-13 PROCEDURE — 83521 IG LIGHT CHAINS FREE EACH: CPT | Mod: 59

## 2024-09-13 PROCEDURE — 96401 CHEMO ANTI-NEOPL SQ/IM: CPT

## 2024-09-13 PROCEDURE — G2211 COMPLEX E/M VISIT ADD ON: HCPCS | Performed by: REGISTERED NURSE

## 2024-09-13 RX ORDER — MEPERIDINE HYDROCHLORIDE 25 MG/ML
25 INJECTION INTRAMUSCULAR; INTRAVENOUS; SUBCUTANEOUS EVERY 30 MIN PRN
Status: CANCELLED | OUTPATIENT
Start: 2024-09-13

## 2024-09-13 RX ORDER — DIPHENHYDRAMINE HYDROCHLORIDE 50 MG/ML
50 INJECTION INTRAMUSCULAR; INTRAVENOUS
Status: CANCELLED
Start: 2024-09-13

## 2024-09-13 RX ORDER — HEPARIN SODIUM (PORCINE) LOCK FLUSH IV SOLN 100 UNIT/ML 100 UNIT/ML
5 SOLUTION INTRAVENOUS
Status: CANCELLED | OUTPATIENT
Start: 2024-09-13

## 2024-09-13 RX ORDER — DIPHENHYDRAMINE HCL 25 MG
50 CAPSULE ORAL ONCE
Status: CANCELLED
Start: 2024-09-13 | End: 2024-09-13

## 2024-09-13 RX ORDER — ALBUTEROL SULFATE 90 UG/1
1-2 AEROSOL, METERED RESPIRATORY (INHALATION)
Status: CANCELLED
Start: 2024-09-13

## 2024-09-13 RX ORDER — DEXAMETHASONE 4 MG/1
12 TABLET ORAL ONCE
Status: CANCELLED
Start: 2024-09-13 | End: 2024-09-13

## 2024-09-13 RX ORDER — HEPARIN SODIUM,PORCINE 10 UNIT/ML
5 VIAL (ML) INTRAVENOUS
Status: CANCELLED | OUTPATIENT
Start: 2024-09-13

## 2024-09-13 RX ORDER — ACETAMINOPHEN 325 MG/1
650 TABLET ORAL ONCE
Status: CANCELLED
Start: 2024-09-13 | End: 2024-09-13

## 2024-09-13 RX ORDER — ALBUTEROL SULFATE 0.83 MG/ML
2.5 SOLUTION RESPIRATORY (INHALATION)
Status: CANCELLED | OUTPATIENT
Start: 2024-09-13

## 2024-09-13 RX ORDER — EPINEPHRINE 1 MG/ML
0.3 INJECTION, SOLUTION INTRAMUSCULAR; SUBCUTANEOUS EVERY 5 MIN PRN
Status: CANCELLED | OUTPATIENT
Start: 2024-09-13

## 2024-09-13 RX ORDER — NALOXONE HYDROCHLORIDE 0.4 MG/ML
0.2 INJECTION, SOLUTION INTRAMUSCULAR; INTRAVENOUS; SUBCUTANEOUS
Status: CANCELLED | OUTPATIENT
Start: 2024-09-13

## 2024-09-13 RX ORDER — METHYLPREDNISOLONE SODIUM SUCCINATE 125 MG/2ML
125 INJECTION, POWDER, LYOPHILIZED, FOR SOLUTION INTRAMUSCULAR; INTRAVENOUS
Status: CANCELLED
Start: 2024-09-13

## 2024-09-13 RX ADMIN — DARATUMUMAB AND HYALURONIDASE-FIHJ (HUMAN RECOMBINANT) 1800 MG: 1800; 30000 INJECTION SUBCUTANEOUS at 11:45

## 2024-09-13 ASSESSMENT — PAIN SCALES - GENERAL: PAINLEVEL: NO PAIN (0)

## 2024-09-13 NOTE — NURSING NOTE
"Oncology Rooming Note    September 13, 2024 11:08 AM   June Ronquillo is a 54 year old male who presents for:    Chief Complaint   Patient presents with    Blood Draw     Vitals, blood draw,  by MA. Pt checked into appt.    Oncology Clinic Visit     MM     Initial Vitals: BP (!) 148/87   Pulse 84   Temp 97.4  F (36.3  C) (Oral)   Resp 16   Wt 73.3 kg (161 lb 8 oz)   SpO2 100%   BMI 27.29 kg/m   Estimated body mass index is 27.29 kg/m  as calculated from the following:    Height as of 2/28/24: 1.638 m (5' 4.5\").    Weight as of this encounter: 73.3 kg (161 lb 8 oz). Body surface area is 1.83 meters squared.  No Pain (0) Comment: Data Unavailable   No LMP for male patient.  Allergies reviewed: Yes  Medications reviewed: Yes    Medications: Medication refills not needed today.  Pharmacy name entered into [employer]:    [facility] [place], [place] - [phone]  [facility] - [place], [place] - [address] [facility] [place], [place] [phone]  [facility] [place], [place] - [address] [facility]  [facility] [place], [place] [facility] [address]    Frailty Screening:   Is the patient here for a new oncology consult visit in cancer care? 2. No      Clinical concerns:        Ivana Espinoza              "

## 2024-09-13 NOTE — PROGRESS NOTES
Infusion Nursing Note:  June Ronquillo presents today for Cycle 35 Day 1 Darzalex Faspro.    Patient seen by provider today: Yes: Laura Ballesteros NP.   present during visit today: Not Applicable.    Note: Pt presents to infusion feeling well. He offers no new concerns following his provider appointment today. Logan confirms he is taking his Revlimid as prescribed and has adequate supply at home.    Per secure chat with Laura Ballesteros NP @ [phone]:  - no need for IVF today, will delete from treatment plan  - still holding off on IVIG, will update orders when we have a plan    Treatment Conditions:  Lab Results   Component Value Date    HGB 11.7 (L) 09/13/2024    WBC 4.8 09/13/2024    ANEU 4.7 03/13/2023    ANEUTAUTO 2.4 09/13/2024     09/13/2024        Lab Results   Component Value Date     09/13/2024    POTASSIUM 3.9 09/13/2024    MAG 2.6 (H) 04/28/2023    CR 1.18 (H) 09/13/2024    HARDEEP 9.0 09/13/2024    BILITOTAL 0.4 09/13/2024    ALBUMIN 4.3 09/13/2024    ALT 17 09/13/2024    AST 32 09/13/2024     Results reviewed, labs MET treatment parameters, ok to proceed with treatment.      Post Infusion Assessment:  Patient tolerated injection to LLQ without incident.       Discharge Plan:   Patient declined prescription refills.  Discharge instructions reviewed with: Patient.  Patient and/or family verbalized understanding of discharge instructions and all questions answered.  AVS to patient via [employer]. Patient will return 10/11/24 for next appointment.   Patient discharged in stable condition accompanied by: self.  Departure Mode: Ambulatory.      Zehra Ramirez RN  
normal...

## 2024-09-13 NOTE — PROGRESS NOTES
[facility]  Date of visit: 09/13/2024        Oncologic History  - 4/30/2021 M spike of 34.8 in urine.M spike in blood 0.2; IgG 702 with depressed IgA and IgM. Beta 2 microglobulin 2.7. Lambda  with K/L ratio of 0.01. WBC 11.1 with absolute eos of 1.0. hgb, plt, Cr (1.1), and albumin WNL.    -4/30/2021 CT abd/pelvis showed sclerotic marrow changes with numerous lytic appearing lesions throughout the imaged portions of the spine, pelvis and ribs.      -PET scan 5/11/2021 Numerous osteolytic lesions which predominantly demonstrate uptake below liver background levels. There is one intraosseous lesion in the left lateral 9th rib that demonstrates uptake above background, possibly due to nondisplaced fracture. Adjacent to this lesion is mild hypermetabolism to the left pleura, with new small left pleural effusion, suspicious for malignant effusion versus posttraumatic effusion. Pleural uptake may represent extramedullary myeloma extending along the intercostal space versus inflammation.    - BM bx 5/17/2021 with flow showing 4.0% plasma cells which express CD19 (dim), CD38 (bright), CD45 (dim), , and monotypic cytoplasmic lambda immunoglobulin light chains but lack CD20 and CD56.  Hypercellular bone marrow for age (approximately 75% cellularity) with adequate trilineage hematopoiesis. Plasma cell infiltrate: Interstitial, lambda monotypic and representing approximately 60% the bone marrow cellularity, by  immunohistochemical stain. Cytpgenetics with 2 related hypodiploid clones. One with loss of Y, monosomy 13 and 14 (5%) and one with translocation of 8p and 14, derivative 16 with long arm replaced by extra copy 1q,monosomy 21. FISH showed gain of 1q, loss of 13 and 14, IGH-MYC fusion t(8:14)    - Started Miracle-RVd 21 day with velcade on days 1,8,15.     -Cycle 2 Miracle-RVd. Dose reduce dex to 80 mg d/t fatigue and stomach upset on dex days. Also having cough with basilar  streaking on CXR    - 8/31/2021 BM bx -rare suspicious plasma cells in touch imprint. No increase in plasma cells by morph.    -8/31/2021 PET/CT Diffuse osteolytic lesions throughout the axial and appendicular skeleton. Increased sclerosis since prior exam 5/11/2021 without increased metabolism or size.  Hypermetabolic pretracheal lymph node as well as hypermetabolic level 2 lymph nodes within the right neck. These lymph nodes are likely reactive from autoimmune activation via medical therapy. Hypermetabolic subcentimeter nodules within the thyroid gland    - 11/11/2021 Autologous BMT    - 2/21/22 started maintenance with Revlimid 10 mg and daratumumab monthly; did Revlimid alone for C1 due to low IgG levels, added daratumumab starting C2.  Initially did Revlimid daily, decreased to 21/28 days in Nov 2023 to help minimize side effects    Interval Hx:   Logan presents to clinic today for toxicity check prior to C35D1.  His energy levels and appetite are good with no concerns.  No new pain  He has traveled to 5 different states this past month and has not been ill.  He started wearing a mask on the plane and feels like that has made a significant difference.  Denies fevers/chills, night sweats, SOB/cough, chest pain, rashes/sores, bleeding issues, new pains, all other acute issues or concerns.    Physical Exam:  BP (!) 148/87   Pulse 84   Temp 97.4  F (36.3  C) (Oral)   Resp 16   Wt 73.3 kg (161 lb 8 oz)   SpO2 100%   BMI 27.29 kg/m      Wt Readings from Last 5 Encounters:   09/13/24 73.3 kg (161 lb 8 oz)   08/16/24 72 kg (158 lb 11.2 oz)   07/19/24 71.3 kg (157 lb 3.2 oz)   06/25/24 71.6 kg (157 lb 12.8 oz)   05/24/24 71 kg (156 lb 9.6 oz)     Constitutional: Appears stated age, well-groomed, sounds congested  HEENT: Normocephilic. EOMI, PERRL. Sclerae are anicteric.   Respiratory: No increased work of breathing, good air exchange  Cardiovascular: No edema. Regular rate and rhythym  GI: +BS. Soft. No tenderness  "on palpation.  Skin: No bruising or bleeding, normal skin color, texture, turgor, no redness, warmth, or swelling, no rashes, no lesions, no jaundice.  Neurologic: Awake, alert, oriented to name, place and time.    Vascular access: PIV      Allergies   Allergen Reactions    Blood Transfusion Related (Informational Only) Other (See Comments)     Patient has a history of a clinically significant antibody against RBC antigens.  A delay in compatible RBCs may occur.      Current Outpatient Medications   Medication Sig Dispense Refill    acyclovir (ZOVIRAX) 400 MG tablet Take 1 tablet (400 mg) by mouth 2 times daily (Patient not taking: Reported on 8/16/2024) 60 tablet 3    albuterol (PROAIR HFA/PROVENTIL HFA/VENTOLIN HFA) 108 (90 Base) MCG/ACT inhaler Inhale 2 puffs into the lungs 4 times daily 18 g 0    aspirin (ASA) 81 MG EC tablet Take 1 tablet (81 mg) by mouth daily      calcium carbonate-vitamin D (OSCAL) 500-5 MG-MCG tablet TAKE ONE TABLET BY MOUTH ONCE DAILY 90 tablet 3    LENalidomide (REVLIMID) 10 MG CAPS capsule Take 1 capsule (10 mg) by mouth daily 21 capsule 0    LENalidomide (REVLIMID) 10 MG CAPS capsule Take 1 capsule (10 mg) by mouth daily 21 capsule 0    LENalidomide (REVLIMID) 10 MG CAPS capsule Take 1 capsule (10 mg) by mouth daily (Patient not taking: Reported on 7/19/2024) 21 capsule 0    LENalidomide (REVLIMID) 10 MG CAPS capsule Take 1 capsule (10 mg) by mouth daily (Patient not taking: Reported on 6/25/2024) 21 capsule 0    LENalidomide (REVLIMID) 10 MG CAPS capsule Take 1 capsule (10 mg) by mouth daily 21 capsule 0    levothyroxine (SYNTHROID/LEVOTHROID) 112 MCG tablet Take 1 tablet (112 mcg) by mouth daily 90 tablet 1    Needle, Disp, (B-D BLUNT FILL NEEDLE) 18G X 1-1/2\" MISC For use with testosterone injection: Disposable syringe and needles, use one 18 G needle to draw up medication then switch to 23 G injection needle 25 each 1    Needle, Disp, (BD DISP NEEDLE) 23G X 1\" MISC For use with " testosterone injection: Disposable syringe and needles, use one 18 G needle to draw up medication then switch to 23 G injection needle 25 each 1    potassium chloride carson ER (KLOR-CON M10) 10 MEQ CR tablet TAKE TWO TABLETS BY MOUTH  DAILY 60 tablet 3    rosuvastatin (CRESTOR) 40 MG tablet Take 1 tablet (40 mg) by mouth daily 90 tablet 3    syringe, disposable, 1 ML MISC For use with testosterone injection: Disposable syringe and 2 needles, use one 18 G needle to draw up medication then switch to 23 G injection needle 25 each 1    testosterone cypionate (DEPOTESTOSTERONE) 200 MG/ML injection Inject 0.75 mLs (150 mg) into the muscle every 14 days 10 mL 0    vitamin C with B complex (B COMPLEX-C) tablet TAKE ONE TABLET BY MOUTH ONCE DAILY 90 tablet 3     No current facility-administered medications for this visit.     Facility-Administered Medications Ordered in Other Visits   Medication Dose Route Frequency Provider Last Rate Last Admin    daratumumab-hyaluronidase-fihj (DARZALEX FASPRO) SUBCUTANEOUS injection 1,800 mg  1,800 mg Subcutaneous Once Laura Ballesteros, APRN CNP         Most Recent 3 CBC's:  Recent Labs   Lab Test 09/13/24  1043 08/16/24  1453 07/19/24  1001   WBC 4.8 7.4 4.5   HGB 11.7* 11.7* 11.5*   MCV 82 82 84    427 417   ANEUTAUTO 2.4 3.5 2.2     Most Recent 3 BMP's:  Recent Labs   Lab Test 09/13/24  1043 08/16/24  1453 07/19/24  1001    137 139   POTASSIUM 3.9 4.0 4.6   CHLORIDE 107 105 109*   CO2 20* 22 20*   BUN 18.2 18.1 16.9   CR 1.18* 1.06 1.13   ANIONGAP 12 10 10   HARDEEP 9.0 9.1 8.9   * 107* 155*   PROTTOTAL 6.1* 6.3* 6.1*   ALBUMIN 4.3 4.2 4.2    Most Recent 3 LFT's:  Recent Labs   Lab Test 09/13/24  1043 08/16/24  1453 07/19/24  1001   AST 32 25 36   ALT 17 15 20   ALKPHOS 60 76 66   BILITOTAL 0.4 0.3 0.4    Most Recent 2 TSH and T4:  Recent Labs   Lab Test 07/19/24  1001 01/08/24  0713 12/19/22  1103 06/17/22  1332   TSH 0.05* 0.86   < > 0.07*   T4 1.20  --   --  1.36    < >  = values in this interval not displayed.         Assessment and Plan  Multiple myeloma with high risk cytogenetics  - Miracle-RVD with VGPR, then underwent autologous transplant 11/11/21.   - Given his high risk cytogenetics he started miracle + revlimid maintenance therapy 2/21/22--- proceed with C35D1 today (9/13)  - Completed 2 years of zometa in Sept 2023. Continue Vit D.   - Revlimid was decreased to 21/28 days in Nov 2023 to try to minimize side effects (dysgeusia, flu-like symptoms on days following daratumumab)  - He is currently tolerating treatment well without side-effects.     Ppx/ID  - Continue acyclovir daily for viral ppx   - COVID + 10/19/23.  Received COVID booster 02/02/24   - Pneumococcal vaccine given 02/02/24   - Got both Shingrix vaccines 3/4/24 and 5/7/2024  - Hep A vaccine given 5/14/24--to be repeated in 12 weeks  - MMR vaccine 5/14/24    Hypogammaglobulinemia   Start monthly IV Ig if IgG <300  - 6/25/24 IgG 444  - If continues to have repeat URI could consider raising parameters for IVIG    Normocytic Anemia:   - Likely d/t revlimid and daratumumab.   - Will keep monitoring and dose reduce if needed.    - Hgb stable at this time       20 minutes spent on the date of the encounter doing chart review, review of test results, interpretation of tests, patient visit, and documentation     The longitudinal plan of care for the diagnosis(es)/condition(s) as documented were addressed during this visit. Due to the added complexity in care, I will continue to support June in the subsequent management and with ongoing continuity of care.    PATTY Meza [facility]  [address] [place] [address] [phone]

## 2024-09-13 NOTE — LETTER
9/13/2024      June Ronquillo  [address]  [facility] [address]      Dear Colleague,    Thank you for referring your patient, June Ronquillo, to the [facility]. Please see a copy of my visit note below.    [facility]  Date of visit: 09/13/2024        Oncologic History  - 4/30/2021 M spike of 34.8 in urine.M spike in blood 0.2; IgG 702 with depressed IgA and IgM. Beta 2 microglobulin 2.7. Lambda  with K/L ratio of 0.01. WBC 11.1 with absolute eos of 1.0. hgb, plt, Cr (1.1), and albumin WNL.    -4/30/2021 CT abd/pelvis showed sclerotic marrow changes with numerous lytic appearing lesions throughout the imaged portions of the spine, pelvis and ribs.      -PET scan 5/11/2021 Numerous osteolytic lesions which predominantly demonstrate uptake below liver background levels. There is one intraosseous lesion in the left lateral 9th rib that demonstrates uptake above background, possibly due to nondisplaced fracture. Adjacent to this lesion is mild hypermetabolism to the left pleura, with new small left pleural effusion, suspicious for malignant effusion versus posttraumatic effusion. Pleural uptake may represent extramedullary myeloma extending along the intercostal space versus inflammation.    - BM bx 5/17/2021 with flow showing 4.0% plasma cells which express CD19 (dim), CD38 (bright), CD45 (dim), , and monotypic cytoplasmic lambda immunoglobulin light chains but lack CD20 and CD56.  Hypercellular bone marrow for age (approximately 75% cellularity) with adequate trilineage hematopoiesis. Plasma cell infiltrate: Interstitial, lambda monotypic and representing approximately 60% the bone marrow cellularity, by  immunohistochemical stain. Cytpgenetics with 2 related hypodiploid clones. One with loss of Y, monosomy 13 and 14 (5%) and one with translocation of 8p and 14, derivative 16 with long arm replaced by extra copy  1q,monosomy 21. FISH showed gain of 1q, loss of 13 and 14, IGH-MYC fusion t(8:14)    - Started Miracle-RVd 21 day with velcade on days 1,8,15.     -Cycle 2 Miracle-RVd. Dose reduce dex to 80 mg d/t fatigue and stomach upset on dex days. Also having cough with basilar streaking on CXR    - 8/31/2021 BM bx -rare suspicious plasma cells in touch imprint. No increase in plasma cells by morph.    -8/31/2021 PET/CT Diffuse osteolytic lesions throughout the axial and appendicular skeleton. Increased sclerosis since prior exam 5/11/2021 without increased metabolism or size.  Hypermetabolic pretracheal lymph node as well as hypermetabolic level 2 lymph nodes within the right neck. These lymph nodes are likely reactive from autoimmune activation via medical therapy. Hypermetabolic subcentimeter nodules within the thyroid gland    - 11/11/2021 Autologous BMT    - 2/21/22 started maintenance with Revlimid 10 mg and daratumumab monthly; did Revlimid alone for C1 due to low IgG levels, added daratumumab starting C2.  Initially did Revlimid daily, decreased to 21/28 days in Nov 2023 to help minimize side effects    Interval Hx:   Logan presents to clinic today for toxicity check prior to C35D1.  His energy levels and appetite are good with no concerns.  No new pain  He has traveled to 5 different states this past month and has not been ill.  He started wearing a mask on the plane and feels like that has made a significant difference.  Denies fevers/chills, night sweats, SOB/cough, chest pain, rashes/sores, bleeding issues, new pains, all other acute issues or concerns.    Physical Exam:  BP (!) 148/87   Pulse 84   Temp 97.4  F (36.3  C) (Oral)   Resp 16   Wt 73.3 kg (161 lb 8 oz)   SpO2 100%   BMI 27.29 kg/m      Wt Readings from Last 5 Encounters:   09/13/24 73.3 kg (161 lb 8 oz)   08/16/24 72 kg (158 lb 11.2 oz)   07/19/24 71.3 kg (157 lb 3.2 oz)   06/25/24 71.6 kg (157 lb 12.8 oz)   05/24/24 71 kg (156 lb 9.6 oz)      Constitutional: Appears stated age, well-groomed, sounds congested  HEENT: Normocephilic. EOMI, PERRL. Sclerae are anicteric.   Respiratory: No increased work of breathing, good air exchange  Cardiovascular: No edema. Regular rate and rhythym  GI: +BS. Soft. No tenderness on palpation.  Skin: No bruising or bleeding, normal skin color, texture, turgor, no redness, warmth, or swelling, no rashes, no lesions, no jaundice.  Neurologic: Awake, alert, oriented to name, place and time.    Vascular access: PIV      Allergies   Allergen Reactions     Blood Transfusion Related (Informational Only) Other (See Comments)     Patient has a history of a clinically significant antibody against RBC antigens.  A delay in compatible RBCs may occur.      Current Outpatient Medications   Medication Sig Dispense Refill     acyclovir (ZOVIRAX) 400 MG tablet Take 1 tablet (400 mg) by mouth 2 times daily (Patient not taking: Reported on 8/16/2024) 60 tablet 3     albuterol (PROAIR HFA/PROVENTIL HFA/VENTOLIN HFA) 108 (90 Base) MCG/ACT inhaler Inhale 2 puffs into the lungs 4 times daily 18 g 0     aspirin (ASA) 81 MG EC tablet Take 1 tablet (81 mg) by mouth daily       calcium carbonate-vitamin D (OSCAL) 500-5 MG-MCG tablet TAKE ONE TABLET BY MOUTH ONCE DAILY 90 tablet 3     LENalidomide (REVLIMID) 10 MG CAPS capsule Take 1 capsule (10 mg) by mouth daily 21 capsule 0     LENalidomide (REVLIMID) 10 MG CAPS capsule Take 1 capsule (10 mg) by mouth daily 21 capsule 0     LENalidomide (REVLIMID) 10 MG CAPS capsule Take 1 capsule (10 mg) by mouth daily (Patient not taking: Reported on 7/19/2024) 21 capsule 0     LENalidomide (REVLIMID) 10 MG CAPS capsule Take 1 capsule (10 mg) by mouth daily (Patient not taking: Reported on 6/25/2024) 21 capsule 0     LENalidomide (REVLIMID) 10 MG CAPS capsule Take 1 capsule (10 mg) by mouth daily 21 capsule 0     levothyroxine (SYNTHROID/LEVOTHROID) 112 MCG tablet Take 1 tablet (112 mcg) by mouth daily 90  "tablet 1     Needle, Disp, (B-D BLUNT FILL NEEDLE) 18G X 1-1/2\" MISC For use with testosterone injection: Disposable syringe and needles, use one 18 G needle to draw up medication then switch to 23 G injection needle 25 each 1     Needle, Disp, (BD DISP NEEDLE) 23G X 1\" MISC For use with testosterone injection: Disposable syringe and needles, use one 18 G needle to draw up medication then switch to 23 G injection needle 25 each 1     potassium chloride carson ER (KLOR-CON M10) 10 MEQ CR tablet TAKE TWO TABLETS BY MOUTH  DAILY 60 tablet 3     rosuvastatin (CRESTOR) 40 MG tablet Take 1 tablet (40 mg) by mouth daily 90 tablet 3     syringe, disposable, 1 ML MISC For use with testosterone injection: Disposable syringe and 2 needles, use one 18 G needle to draw up medication then switch to 23 G injection needle 25 each 1     testosterone cypionate (DEPOTESTOSTERONE) 200 MG/ML injection Inject 0.75 mLs (150 mg) into the muscle every 14 days 10 mL 0     vitamin C with B complex (B COMPLEX-C) tablet TAKE ONE TABLET BY MOUTH ONCE DAILY 90 tablet 3     No current facility-administered medications for this visit.     Facility-Administered Medications Ordered in Other Visits   Medication Dose Route Frequency Provider Last Rate Last Admin     daratumumab-hyaluronidase-fihj (DARZALEX FASPRO) SUBCUTANEOUS injection 1,800 mg  1,800 mg Subcutaneous Once Laura Ballesteros, APRN CNP         Most Recent 3 CBC's:  Recent Labs   Lab Test 09/13/24  1043 08/16/24  1453 07/19/24  1001   WBC 4.8 7.4 4.5   HGB 11.7* 11.7* 11.5*   MCV 82 82 84    427 417   ANEUTAUTO 2.4 3.5 2.2     Most Recent 3 BMP's:  Recent Labs   Lab Test 09/13/24  1043 08/16/24  1453 07/19/24  1001    137 139   POTASSIUM 3.9 4.0 4.6   CHLORIDE 107 105 109*   CO2 20* 22 20*   BUN 18.2 18.1 16.9   CR 1.18* 1.06 1.13   ANIONGAP 12 10 10   HARDEEP 9.0 9.1 8.9   * 107* 155*   PROTTOTAL 6.1* 6.3* 6.1*   ALBUMIN 4.3 4.2 4.2    Most Recent 3 LFT's:  Recent Labs   Lab " Test 09/13/24  1043 08/16/24  1453 07/19/24  1001   AST 32 25 36   ALT 17 15 20   ALKPHOS 60 76 66   BILITOTAL 0.4 0.3 0.4    Most Recent 2 TSH and T4:  Recent Labs   Lab Test 07/19/24  1001 01/08/24  0713 12/19/22  1103 06/17/22  1332   TSH 0.05* 0.86   < > 0.07*   T4 1.20  --   --  1.36    < > = values in this interval not displayed.         Assessment and Plan  Multiple myeloma with high risk cytogenetics  - Miracle-RVD with VGPR, then underwent autologous transplant 11/11/21.   - Given his high risk cytogenetics he started miracle + revlimid maintenance therapy 2/21/22--- proceed with C35D1 today (9/13)  - Completed 2 years of zometa in Sept 2023. Continue Vit D.   - Revlimid was decreased to 21/28 days in Nov 2023 to try to minimize side effects (dysgeusia, flu-like symptoms on days following daratumumab)  - He is currently tolerating treatment well without side-effects.     Ppx/ID  - Continue acyclovir daily for viral ppx   - COVID + 10/19/23.  Received COVID booster 02/02/24   - Pneumococcal vaccine given 02/02/24   - Got both Shingrix vaccines 3/4/24 and 5/7/2024  - Hep A vaccine given 5/14/24--to be repeated in 12 weeks  - MMR vaccine 5/14/24    Hypogammaglobulinemia   Start monthly IV Ig if IgG <300  - 6/25/24 IgG 444  - If continues to have repeat URI could consider raising parameters for IVIG    Normocytic Anemia:   - Likely d/t revlimid and daratumumab.   - Will keep monitoring and dose reduce if needed.    - Hgb stable at this time       20 minutes spent on the date of the encounter doing chart review, review of test results, interpretation of tests, patient visit, and documentation     The longitudinal plan of care for the diagnosis(es)/condition(s) as documented were addressed during this visit. Due to the added complexity in care, I will continue to support [place] in the subsequent management and with ongoing continuity of care.    PATTY Meza [facility] [address] [place] [address]  [phone]      Again, thank you for allowing me to participate in the care of your patient.        Sincerely,        PATTY Landaverde CNP

## 2024-09-13 NOTE — NURSING NOTE
Chief Complaint   Patient presents with    Blood Draw     Vitals, blood draw,  by MA. Pt checked into appt.     Nelly Rick MA

## 2024-09-13 NOTE — PATIENT INSTRUCTIONS
[facility] Triage and after hours / weekends / holidays:  [phone]    Please call the triage or after hours line if you experience a temperature greater than or equal to 100.4, shaking chills, have uncontrolled nausea, vomiting and/or diarrhea, dizziness, shortness of breath, chest pain, bleeding, unexplained bruising, or if you have any other new/concerning symptoms, questions or concerns.      If you are having any concerning symptoms or wish to speak to a provider before your next infusion visit, please call triage to notify them so we can adequately serve you.     If you need a refill on a narcotic prescription or other medication, please call before your infusion appointment.                September 2024 Sunday Monday Tuesday Wednesday Thursday Friday Saturday   1     2     3     4    LAB   9:00 AM   (15 min.)   RD LAB   [facility] 5     6    RETURN CARDIOLOGY   7:35 AM   (30 min.)   Dominguez Craig MD   [facility] 7       8     9     10     11     12     13    LAB PERIPHERAL  10:30 AM   (15 min.)   [facility] DRAW   [facility]    RETURN CCSL  10:45 AM   (45 min.)   Laura Ballesteros APRN CNP   [facility]    ONC INFUSION 1 HR (60 MIN)  12:00 PM   (60 min.)    ONC INFUSION NURSE   [facility] 14       15     16     17     18     19     20     21       22     23     24     25     26     27     28       29     30                                          October 2024 Sunday Monday Tuesday Wednesday Thursday Friday Saturday             1    RETURN CARDIOLOGY  11:05 AM   (30 min.)   Dominguez Craig MD   [facility] 2     3     4     5       6     7     8     9     10     11    LAB PERIPHERAL  12:00 PM   (15 min.)   [facility]   [facility]    RETURN CCSL  12:15 PM   (45 min.)    Laura Ballesteros APRN CNP   [facility]    ONC INFUSION 1 HR (60 MIN)   1:00 PM   (60 min.)   [facility] ONC INFUSION NURSE   [facility] 12       13     14     15     16     17     18     19       20     21     22     23     24     25     26       27     28     29     30     31                               Lab Results:  Recent Results (from the past 12 hour(s))   Comprehensive metabolic panel    Collection Time: 09/13/24 10:43 AM   Result Value Ref Range    Sodium 139 135 - 145 mmol/L    Potassium 3.9 3.4 - 5.3 mmol/L    Carbon Dioxide (CO2) 20 (L) 22 - 29 mmol/L    Anion Gap 12 7 - 15 mmol/L    Urea Nitrogen 18.2 6.0 - 20.0 mg/dL    Creatinine 1.18 (H) 0.67 - 1.17 mg/dL    GFR Estimate 72 >60 mL/min/1.73m2    Calcium 9.0 8.8 - 10.4 mg/dL    Chloride 107 98 - 107 mmol/L    Glucose 138 (H) 70 - 99 mg/dL    Alkaline Phosphatase 60 40 - 150 U/L    AST 32 0 - 45 U/L    ALT 17 0 - 70 U/L    Protein Total 6.1 (L) 6.4 - 8.3 g/dL    Albumin 4.3 3.5 - 5.2 g/dL    Bilirubin Total 0.4 <=1.2 mg/dL   CBC with platelets and differential    Collection Time: 09/13/24 10:43 AM   Result Value Ref Range    WBC Count 4.8 4.0 - 11.0 10e3/uL    RBC Count 4.55 4.40 - 5.90 10e6/uL    Hemoglobin 11.7 (L) 13.3 - 17.7 g/dL    Hematocrit 37.3 (L) 40.0 - 53.0 %    MCV 82 78 - 100 fL    MCH 25.7 (L) 26.5 - 33.0 pg    MCHC 31.4 (L) 31.5 - 36.5 g/dL    RDW 17.6 (H) 10.0 - 15.0 %    Platelet Count 419 150 - 450 10e3/uL    % Neutrophils 50 %    % Lymphocytes 34 %    % Monocytes 13 %    % Eosinophils 2 %    % Basophils 1 %    % Immature Granulocytes 0 %    NRBCs per 100 WBC 0 <1 /100    Absolute Neutrophils 2.4 1.6 - 8.3 10e3/uL    Absolute Lymphocytes 1.6 0.8 - 5.3 10e3/uL    Absolute Monocytes 0.6 0.0 - 1.3 10e3/uL    Absolute Eosinophils 0.1 0.0 - 0.7 10e3/uL    Absolute Basophils 0.0 0.0 - 0.2 10e3/uL    Absolute Immature Granulocytes 0.0 <=0.4 10e3/uL    Absolute NRBCs 0.0 10e3/uL   Total Protein,  Serum for ELP    Collection Time: 09/13/24 10:43 AM   Result Value Ref Range    Total Protein Serum for ELP 6.0 (L) 6.4 - 8.3 g/dL

## 2024-09-16 LAB
IGA SERPL-MCNC: 44 MG/DL (ref 84–499)
IGG SERPL-MCNC: 511 MG/DL (ref 610–1616)
IGM SERPL-MCNC: 39 MG/DL (ref 35–242)
KAPPA LC FREE SER-MCNC: 0.6 MG/DL (ref 0.33–1.94)
KAPPA LC FREE/LAMBDA FREE SER NEPH: 1.25 {RATIO} (ref 0.26–1.65)
LAMBDA LC FREE SERPL-MCNC: 0.48 MG/DL (ref 0.57–2.63)

## 2024-09-17 LAB
ALBUMIN SERPL ELPH-MCNC: 4 G/DL (ref 3.7–5.1)
ALPHA1 GLOB SERPL ELPH-MCNC: 0.3 G/DL (ref 0.2–0.4)
ALPHA2 GLOB SERPL ELPH-MCNC: 0.5 G/DL (ref 0.5–0.9)
B-GLOBULIN SERPL ELPH-MCNC: 0.7 G/DL (ref 0.6–1)
GAMMA GLOB SERPL ELPH-MCNC: 0.5 G/DL (ref 0.7–1.6)
LOCATION OF TASK: ABNORMAL
LOCATION OF TASK: NORMAL
M PROTEIN SERPL ELPH-MCNC: 0.1 G/DL
PROT PATTERN SERPL ELPH-IMP: ABNORMAL
PROT PATTERN SERPL IFE-IMP: NORMAL

## 2024-09-26 DIAGNOSIS — C90.00 MULTIPLE MYELOMA NOT HAVING ACHIEVED REMISSION (H): ICD-10-CM

## 2024-09-27 ENCOUNTER — TELEPHONE (OUTPATIENT)
Dept: ONCOLOGY | Facility: CLINIC | Age: 54
End: 2024-09-27
Payer: COMMERCIAL

## 2024-09-27 DIAGNOSIS — C90.00 MULTIPLE MYELOMA NOT HAVING ACHIEVED REMISSION (H): Primary | ICD-10-CM

## 2024-09-27 RX ORDER — LENALIDOMIDE 10 MG/1
10 CAPSULE ORAL DAILY
Qty: 21 CAPSULE | Refills: 0 | Status: SHIPPED | OUTPATIENT
Start: 2024-10-11

## 2024-09-27 NOTE — TELEPHONE ENCOUNTER
Oral Chemotherapy Monitoring Program   Medication: Revlimid  Rx: 10mg PO daily on days 1 through 21 of 28 day cycle   Auth #: [id number]   Risk Category: Adult Male  Routine survey questions reviewed.   Rx to be Escribed to [facility]    Yu Goldberg  [facility]  Oncology Pharmacy Liaison   [email or web address]  [phone] (phone)  [phone] (fax)

## 2024-09-30 DIAGNOSIS — C90.01 MULTIPLE MYELOMA IN REMISSION (H): Primary | ICD-10-CM

## 2024-10-01 ENCOUNTER — OFFICE VISIT (OUTPATIENT)
Dept: CARDIOLOGY | Facility: CLINIC | Age: 54
End: 2024-10-01
Payer: COMMERCIAL

## 2024-10-01 VITALS
BODY MASS INDEX: 27.02 KG/M2 | HEART RATE: 78 BPM | WEIGHT: 162.2 LBS | OXYGEN SATURATION: 100 % | HEIGHT: 65 IN | DIASTOLIC BLOOD PRESSURE: 67 MMHG | SYSTOLIC BLOOD PRESSURE: 145 MMHG

## 2024-10-01 DIAGNOSIS — I10 BENIGN ESSENTIAL HYPERTENSION: ICD-10-CM

## 2024-10-01 DIAGNOSIS — I25.10 CORONARY ARTERY DISEASE INVOLVING NATIVE CORONARY ARTERY OF NATIVE HEART WITHOUT ANGINA PECTORIS: Primary | ICD-10-CM

## 2024-10-01 DIAGNOSIS — I25.10 CVD (CARDIOVASCULAR DISEASE): ICD-10-CM

## 2024-10-01 DIAGNOSIS — E78.5 HYPERLIPIDEMIA LDL GOAL <100: ICD-10-CM

## 2024-10-01 PROCEDURE — 99214 OFFICE O/P EST MOD 30 MIN: CPT | Performed by: INTERNAL MEDICINE

## 2024-10-01 RX ORDER — ROSUVASTATIN CALCIUM 40 MG/1
40 TABLET, COATED ORAL DAILY
Qty: 90 TABLET | Refills: 4 | Status: SHIPPED | OUTPATIENT
Start: 2024-10-01

## 2024-10-01 NOTE — LETTER
10/1/2024    Naren Cherry MD  [address] [facility] [address]    RE: June Ronquillo       Dear Colleague,     I had the pleasure of seeing June Ronquillo in the [facility].  HPI and Plan:   It is a pleasure for me to see Mr. Ronquillo for followup of coronary artery disease.      In 2004, he presented to [facility] with an acute inferoposterior STEMI. The culprit was a 90% mid-circumflex stenosis, which was successfully revascularized with placement of a drug-eluting stent. He has normal left ventricular systolic function.    Cardiac risk factors include hypertension dyslipidemia impaired glucose tolerance.  We have not had to perform further revascularizations since his index event.    In 2021, he unfortunately was diagnosed with multiple myeloma.  He underwent successful autologous bone marrow transplant but he does have high risk cytogenetics and he is on maintenance chemotherapy which she tolerates well    He is a successful  and he travels a lot.  He has a URI at this time but no fevers.    He denies symptoms of angina or heart failure.  He exercises on a regular basis.    He took some decongestants which I think may explain the blood pressure of 145/67.  Is on Crestor 40 his LDL is 50 which is very good.    Cardiac examination is unremarkable.    I advised home blood pressure monitoring aiming for levels around 130/80.  I plan to follow-up with him in 1 years time.        No orders of the defined types were placed in this encounter.      Orders Placed This Encounter   Medications     rosuvastatin (CRESTOR) 40 MG tablet     Sig: Take 1 tablet (40 mg) by mouth daily.     Dispense:  90 tablet     Refill:  4       Encounter Diagnoses   Name Primary?     Coronary artery disease involving native coronary artery of native heart without angina pectoris Yes     Benign essential hypertension      Hyperlipidemia LDL goal <100      CVD (cardiovascular disease)        CURRENT  MEDICATIONS:  Current Outpatient Medications   Medication Sig Dispense Refill     acyclovir (ZOVIRAX) 400 MG tablet Take 400 mg by mouth 2 times daily. 60 tablet 3     albuterol (PROAIR HFA/PROVENTIL HFA/VENTOLIN HFA) 108 (90 Base) MCG/ACT inhaler Inhale 2 puffs into the lungs 4 times daily 18 g 0     aspirin (ASA) 81 MG EC tablet Take 1 tablet (81 mg) by mouth daily       calcium carbonate-vitamin D (OSCAL) 500-5 MG-MCG tablet TAKE ONE TABLET BY MOUTH ONCE DAILY 90 tablet 3     [START ON 10/11/2024] LENalidomide (REVLIMID) 10 MG CAPS capsule Take 1 capsule (10 mg) by mouth daily 21 capsule 0     levothyroxine (SYNTHROID/LEVOTHROID) 112 MCG tablet Take 1 tablet (112 mcg) by mouth daily 90 tablet 1     Needle, Disp, (B-D BLUNT FILL NEEDLE) 18G X 1-1/2\" MISC For use with testosterone injection: Disposable syringe and needles, use one 18 G needle to draw up medication then switch to 23 G injection needle 25 each 1     Needle, Disp, (BD DISP NEEDLE) 23G X 1\" MISC For use with testosterone injection: Disposable syringe and needles, use one 18 G needle to draw up medication then switch to 23 G injection needle 25 each 1     potassium chloride carson ER (KLOR-CON M10) 10 MEQ CR tablet TAKE TWO TABLETS BY MOUTH  DAILY 60 tablet 3     rosuvastatin (CRESTOR) 40 MG tablet Take 1 tablet (40 mg) by mouth daily. 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  A delay in compatible RBCs may occur.        PAST MEDICAL HISTORY:  Past Medical History:   Diagnosis Date     CAD (coronary artery disease)     s/p stent to CFX     Cancer (H) 2019     Heart attack (H)      Hyperlipidemia LDL goal <100      Hypertension      Stented coronary artery      Thyroid disease        PAST SURGICAL HISTORY:  Past Surgical History:   Procedure Laterality Date     BIOPSY  Oct  2021     BONE MARROW BIOPSY, BONE SPECIMEN, NEEDLE/TROCAR Left 05/17/2021    Procedure: BIOPSY, BONE MARROW with aspiration and ancillary studies;  Surgeon: Niharika Regalado MD;  Location: [facility] OR     BONE MARROW BIOPSY, BONE SPECIMEN, NEEDLE/TROCAR Left 10/14/2021    Procedure: BIOPSY, BONE MARROW;  Surgeon: Alexa Albert APRN CNP;  Location: [facility] OR     BONE MARROW BIOPSY, BONE SPECIMEN, NEEDLE/TROCAR Right 03/07/2022    Procedure: BIOPSY, BONE MARROW;  Surgeon: Deborah Carmona PA-C;  Location: [facility] OR     BONE MARROW BIOPSY, BONE SPECIMEN, NEEDLE/TROCAR N/A 05/09/2022    Procedure: BIOPSY, BONE MARROW;  Surgeon: Deborah Leblanc PA-C;  Location: [facility] OR     BONE MARROW BIOPSY, BONE SPECIMEN, NEEDLE/TROCAR Left 12/05/2022    Procedure: BIOPSY, BONE MARROW;  Surgeon: Alba Moses APRN CNP;  Location: [facility] OR     BONE MARROW BIOPSY, BONE SPECIMEN, NEEDLE/TROCAR N/A 11/16/2023    Procedure: Bone marrow biopsy, bone specimen, needle/trocar;  Surgeon: Sneha Vance PA-C;  Location: [facility] OR     COLONOSCOPY N/A 02/01/2024    Procedure: COLONOSCOPY, WITH POLYPECTOMY;  Surgeon: Candice Skinner MD;  Location: [facility] OR     ENDOSCOPIC ULTRASOUND LOWER GASTROINTESTIONAL TRACT (GI) N/A 02/08/2024    Procedure: ULTRASOUND, LOWER GI TRACT, ENDOSCOPIC and EMR;  Surgeon: Alfredo Henry MD;  Location: [facility] OR     HEART CATH PTCA SINGLE VESSEL  10/10/2004    Stent to CFX - [email or web address]      INSERT CATHETER VASCULAR ACCESS Right 11/03/2021    Procedure: NON VALVED TUNNELED DUAL  LUMEN APHARESIS CAPABLE LINE INSERTION, VASCULAR ACCESS CATHETER;  Surgeon: Werner Mcnamara MD;  Location: [facility] OR     IR CVC TUNNEL PLACEMENT > 5 YRS OF AGE  11/03/2021     IR CVC TUNNEL REMOVAL RIGHT  12/02/2021     THYROIDECTOMY N/A 05/03/2022    Procedure: total thyroidectomy, Left selective neck dissection level 6 and level 7 ;  Surgeon: Clarita Sepulveda MD;  Location: [facility] OR     TRANSPLANT  2021    Bone marrow       FAMILY HISTORY:  Family History   Problem Relation Age of Onset     Hypertension Mother      Hyperlipidemia Mother      Heart Disease Paternal Grandmother      Hyperlipidemia Sister      Hyperlipidemia Sister        SOCIAL HISTORY:  Social History     Socioeconomic History     Marital status:      Spouse name: None     Number of children: None     Years of education: None     Highest education level: None   Tobacco Use     Smoking status: Never     Smokeless tobacco: Never   Vaping Use     Vaping status: Never Used   Substance and Sexual Activity     Alcohol use: Not Currently     Drug use: Never     Sexual activity: Yes     Partners: Female   Other Topics Concern     Parent/sibling w/ CABG, MI or angioplasty before 65F 55M? No     Caffeine Concern No     Special Diet No     Exercise No     Seat Belt Yes     Social Determinants of Health     Financial Resource Strain: Low Risk  (2/26/2024)    Financial Resource Strain      Within the past 12 months, have you or your family members you live with been unable to get utilities (heat, electricity) when it was really needed?: No   Food Insecurity: Low Risk  (2/26/2024)    Food Insecurity      Within the past 12 months, did you worry that your food would run out before you got money to buy more?: No      Within the past 12 months, did the food you bought just not last and you didn t have money to get more?: No   Transportation Needs: Low Risk  (2/26/2024)    Transportation Needs      Within the past 12 months, has lack of transportation  "kept you from medical appointments, getting your medicines, non-medical meetings or appointments, work, or from getting things that you need?: No   Physical Activity: Insufficiently Active (2/26/2024)    Exercise Vital Sign      Days of Exercise per Week: 2 days      Minutes of Exercise per Session: 20 min   Stress: No Stress Concern Present (2/26/2024)    [employer] - Occupational Stress Questionnaire      Feeling of Stress : Not at all   Social Connections: Unknown (2/26/2024)    Social Connection and Isolation Panel [NHANES]      Frequency of Social Gatherings with Friends and Family: Patient declined   Interpersonal Safety: Low Risk  (2/28/2024)    Interpersonal Safety      Do you feel physically and emotionally safe where you currently live?: Yes      Within the past 12 months, have you been hit, slapped, kicked or otherwise physically hurt by someone?: No      Within the past 12 months, have you been humiliated or emotionally abused in other ways by your partner or ex-partner?: No   Housing Stability: Low Risk  (2/26/2024)    Housing Stability      Do you have housing? : Yes      Are you worried about losing your housing?: No       Review of Systems:  Skin:        Eyes:       ENT:       Respiratory:  Negative    Cardiovascular:  Negative    Gastroenterology:      Genitourinary:       Musculoskeletal:       Neurologic:       Psychiatric:       Heme/Lymph/Imm:       Endocrine:         Physical Exam:  Vitals: BP (!) 145/67   Pulse 78   Ht 1.638 m (5' 4.5\")   Wt 73.6 kg (162 lb 3.2 oz)   SpO2 100%   BMI 27.41 kg/m      Constitutional:  cooperative, alert and oriented, well developed, well nourished, in no acute distress        Skin:  warm and dry to the touch, no apparent skin lesions or masses noted          Head:  normocephalic, no masses or lesions        Eyes:  pupils equal and round, conjunctivae and lids unremarkable, sclera white, no xanthalasma, EOMS intact, no nystagmus  "       Lymph:No Cervical lymphadenopathy present     ENT:  no pallor or cyanosis, dentition good        Neck:  carotid pulses are full and equal bilaterally, JVP normal, no carotid bruit        Respiratory:  normal breath sounds, clear to auscultation, normal A-P diameter, normal symmetry, normal respiratory excursion, no use of accessory muscles         Cardiac: regular rhythm, normal S1/S2, no S3 or S4, apical impulse not displaced, no murmurs, gallops or rubs                pulses full and equal, no bruits auscultated                                        GI:  abdomen soft, non-tender, BS normoactive, no mass, no HSM, no bruits        Extremities and Muscular Skeletal:  no deformities, clubbing, cyanosis, erythema observed              Neurological:  no gross motor deficits        Psych:  Alert and Oriented x 3        Recent Lab Results:  LIPID RESULTS:  Lab Results   Component Value Date    CHOL 118 09/04/2024    CHOL 154 01/20/2021    HDL 44 09/04/2024    HDL 41 01/20/2021    LDL 50 09/04/2024    LDL 65 01/20/2021    TRIG 118 09/04/2024    TRIG 241 (H) 01/20/2021    CHOLHDLRATIO 4.2 06/19/2015       LIVER ENZYME RESULTS:  Lab Results   Component Value Date    AST 32 09/13/2024    AST 23 07/09/2021    ALT 17 09/13/2024    ALT 25 07/09/2021       CBC RESULTS:  Lab Results   Component Value Date    WBC 4.8 09/13/2024    WBC 9.6 07/09/2021    RBC 4.55 09/13/2024    RBC 3.62 (L) 07/09/2021    HGB 11.7 (L) 09/13/2024    HGB 10.9 (L) 07/09/2021    HCT 37.3 (L) 09/13/2024    HCT 32.1 (L) 07/09/2021    MCV 82 09/13/2024    MCV 89 07/09/2021    MCH 25.7 (L) 09/13/2024    MCH 30.1 07/09/2021    MCHC 31.4 (L) 09/13/2024    MCHC 34.0 07/09/2021    RDW 17.6 (H) 09/13/2024    RDW 17.0 (H) 07/09/2021     09/13/2024     (H) 07/09/2021       BMP RESULTS:  Lab Results   Component Value Date     09/13/2024     07/09/2021    POTASSIUM 3.9 09/13/2024    POTASSIUM 3.8 08/16/2022    POTASSIUM 3.7 07/09/2021     CHLORIDE 107 09/13/2024    CHLORIDE 108 08/16/2022    CHLORIDE 107 07/09/2021    CO2 20 (L) 09/13/2024    CO2 22 08/16/2022    CO2 24 07/09/2021    ANIONGAP 12 09/13/2024    ANIONGAP 8 08/16/2022    ANIONGAP 5 07/09/2021     (H) 09/13/2024     (H) 08/16/2022    GLC 91 07/09/2021    BUN 18.2 09/13/2024    BUN 10 08/16/2022    BUN 8 07/09/2021    CR 1.18 (H) 09/13/2024    CR 0.87 07/09/2021    GFRESTIMATED 72 09/13/2024    GFRESTIMATED >90 07/09/2021    GFRESTBLACK >90 07/09/2021    HARDEEP 9.0 09/13/2024    HARDEEP 8.4 (L) 07/09/2021        A1C RESULTS:  Lab Results   Component Value Date    A1C 5.9 (H) 03/01/2024    A1C 6.0 (H) 01/25/2021       INR RESULTS:  Lab Results   Component Value Date    INR 0.98 12/05/2022    INR 0.89 05/09/2022           CC  Naren Cherry MD  [address] [place], [place] [id number]                     Thank you for allowing me to participate in the care of your patient.      Sincerely,     DR GRACIELA DEL CID MD     [facility]  cc:   Naren Cherry MD  [address] [place] [id number]

## 2024-10-01 NOTE — PROGRESS NOTES
HPI and Plan:   It is a pleasure for me to see Mr. Ronquillo for followup of coronary artery disease.      In 2004, he presented to [facility] with an acute inferoposterior STEMI. The culprit was a 90% mid-circumflex stenosis, which was successfully revascularized with placement of a drug-eluting stent. He has normal left ventricular systolic function.    Cardiac risk factors include hypertension dyslipidemia impaired glucose tolerance.  We have not had to perform further revascularizations since his index event.    In 2021, he unfortunately was diagnosed with multiple myeloma.  He underwent successful autologous bone marrow transplant but he does have high risk cytogenetics and he is on maintenance chemotherapy which she tolerates well    He is a successful  and he travels a lot.  He has a URI at this time but no fevers.    He denies symptoms of angina or heart failure.  He exercises on a regular basis.    He took some decongestants which I think may explain the blood pressure of 145/67.  Is on Crestor 40 his LDL is 50 which is very good.    Cardiac examination is unremarkable.    I advised home blood pressure monitoring aiming for levels around 130/80.  I plan to follow-up with him in 1 years time.        No orders of the defined types were placed in this encounter.      Orders Placed This Encounter   Medications    rosuvastatin (CRESTOR) 40 MG tablet     Sig: Take 1 tablet (40 mg) by mouth daily.     Dispense:  90 tablet     Refill:  4       Encounter Diagnoses   Name Primary?    Coronary artery disease involving native coronary artery of native heart without angina pectoris Yes    Benign essential hypertension     Hyperlipidemia LDL goal <100     CVD (cardiovascular disease)        CURRENT MEDICATIONS:  Current Outpatient Medications   Medication Sig Dispense Refill    acyclovir (ZOVIRAX) 400 MG tablet Take 400 mg by mouth 2 times daily. 60 tablet 3    albuterol (PROAIR HFA/PROVENTIL HFA/VENTOLIN HFA) 108 (90  "Base) MCG/ACT inhaler Inhale 2 puffs into the lungs 4 times daily 18 g 0    aspirin (ASA) 81 MG EC tablet Take 1 tablet (81 mg) by mouth daily      calcium carbonate-vitamin D (OSCAL) 500-5 MG-MCG tablet TAKE ONE TABLET BY MOUTH ONCE DAILY 90 tablet 3    [START ON 10/11/2024] LENalidomide (REVLIMID) 10 MG CAPS capsule Take 1 capsule (10 mg) by mouth daily 21 capsule 0    levothyroxine (SYNTHROID/LEVOTHROID) 112 MCG tablet Take 1 tablet (112 mcg) by mouth daily 90 tablet 1    Needle, Disp, (B-D BLUNT FILL NEEDLE) 18G X 1-1/2\" MISC For use with testosterone injection: Disposable syringe and needles, use one 18 G needle to draw up medication then switch to 23 G injection needle 25 each 1    Needle, Disp, (BD DISP NEEDLE) 23G X 1\" MISC For use with testosterone injection: Disposable syringe and needles, use one 18 G needle to draw up medication then switch to 23 G injection needle 25 each 1    potassium chloride carson ER (KLOR-CON M10) 10 MEQ CR tablet TAKE TWO TABLETS BY MOUTH  DAILY 60 tablet 3    rosuvastatin (CRESTOR) 40 MG tablet Take 1 tablet (40 mg) by mouth daily. 90 tablet 4    syringe, disposable, 1 ML MISC For use with testosterone injection: Disposable syringe and 2 needles, use one 18 G needle to draw up medication then switch to 23 G injection needle 25 each 1    testosterone cypionate (DEPOTESTOSTERONE) 200 MG/ML injection Inject 0.75 mLs (150 mg) into the muscle every 14 days 10 mL 0    vitamin C with B complex (B COMPLEX-C) tablet TAKE ONE TABLET BY MOUTH ONCE DAILY 90 tablet 3       ALLERGIES     Allergies   Allergen Reactions    Blood Transfusion Related (Informational Only) Other (See Comments)     Patient has a history of a clinically significant antibody against RBC antigens.  A delay in compatible RBCs may occur.        PAST MEDICAL HISTORY:  Past Medical History:   Diagnosis Date    CAD (coronary artery disease)     s/p stent to CFX    Cancer (H) 2019    Heart attack (H)     Hyperlipidemia LDL " goal <100     Hypertension     Stented coronary artery     Thyroid disease        PAST SURGICAL HISTORY:  Past Surgical History:   Procedure Laterality Date    BIOPSY  Oct  2021    BONE MARROW BIOPSY, BONE SPECIMEN, NEEDLE/TROCAR Left 05/17/2021    Procedure: BIOPSY, BONE MARROW with aspiration and ancillary studies;  Surgeon: Niharika Regalado MD;  Location: [facility] OR    BONE MARROW BIOPSY, BONE SPECIMEN, NEEDLE/TROCAR Left 10/14/2021    Procedure: BIOPSY, BONE MARROW;  Surgeon: Alexa Albert APRN CNP;  Location: [facility] OR    BONE MARROW BIOPSY, BONE SPECIMEN, NEEDLE/TROCAR Right 03/07/2022    Procedure: BIOPSY, BONE MARROW;  Surgeon: Deborah Carmona PA-C;  Location: [facility] OR    BONE MARROW BIOPSY, BONE SPECIMEN, NEEDLE/TROCAR N/A 05/09/2022    Procedure: BIOPSY, BONE MARROW;  Surgeon: Deborah Leblanc PA-C;  Location: [facility] OR    BONE MARROW BIOPSY, BONE SPECIMEN, NEEDLE/TROCAR Left 12/05/2022    Procedure: BIOPSY, BONE MARROW;  Surgeon: Alba Moses APRN CNP;  Location: [facility] OR    BONE MARROW BIOPSY, BONE SPECIMEN, NEEDLE/TROCAR N/A 11/16/2023    Procedure: Bone marrow biopsy, bone specimen, needle/trocar;  Surgeon: Sneha Vance PA-C;  Location: [facility] OR    COLONOSCOPY N/A 02/01/2024    Procedure: COLONOSCOPY, WITH POLYPECTOMY;  Surgeon: Candice Skinner MD;  Location: [facility] OR    ENDOSCOPIC ULTRASOUND LOWER GASTROINTESTIONAL TRACT (GI) N/A 02/08/2024    Procedure: ULTRASOUND, LOWER GI TRACT, ENDOSCOPIC and EMR;  Surgeon: Alfredo Henry MD;  Location: [facility] OR    HEART CATH PTCA SINGLE VESSEL  10/10/2004    Stent to [employer]     INSERT CATHETER VASCULAR ACCESS Right 11/03/2021    Procedure: NON VALVED TUNNELED DUAL LUMEN APHARESIS CAPABLE LINE INSERTION, VASCULAR ACCESS CATHETER;  Surgeon: Werner Mcnamara MD;  Location: [facility] OR    IR CVC TUNNEL PLACEMENT > 5 YRS OF AGE  11/03/2021    IR CVC TUNNEL REMOVAL RIGHT  12/02/2021    THYROIDECTOMY N/A 05/03/2022     Procedure: total thyroidectomy, Left selective neck dissection level 6 and level 7 ;  Surgeon: Clarita Sepulveda MD;  Location: [facility] OR    TRANSPLANT  2021    Bone marrow       FAMILY HISTORY:  Family History   Problem Relation Age of Onset    Hypertension Mother     Hyperlipidemia Mother     Heart Disease Paternal Grandmother     Hyperlipidemia Sister     Hyperlipidemia Sister        SOCIAL HISTORY:  Social History     Socioeconomic History    Marital status:      Spouse name: None    Number of children: None    Years of education: None    Highest education level: None   Tobacco Use    Smoking status: Never    Smokeless tobacco: Never   Vaping Use    Vaping status: Never Used   Substance and Sexual Activity    Alcohol use: Not Currently    Drug use: Never    Sexual activity: Yes     Partners: Female   Other Topics Concern    Parent/sibling w/ CABG, MI or angioplasty before 65F 55M? No    Caffeine Concern No    Special Diet No    Exercise No    Seat Belt Yes     Social Determinants of Health     Financial Resource Strain: Low Risk  (2/26/2024)    Financial Resource Strain     Within the past 12 months, have you or your family members you live with been unable to get utilities (heat, electricity) when it was really needed?: No   Food Insecurity: Low Risk  (2/26/2024)    Food Insecurity     Within the past 12 months, did you worry that your food would run out before you got money to buy more?: No     Within the past 12 months, did the food you bought just not last and you didn t have money to get more?: No   Transportation Needs: Low Risk  (2/26/2024)    Transportation Needs     Within the past 12 months, has lack of transportation kept you from medical appointments, getting your medicines, non-medical meetings or appointments, work, or from getting things that you need?: No   Physical Activity: Insufficiently Active (2/26/2024)    Exercise Vital Sign     Days of Exercise per Week: 2 days     Minutes of  "Exercise per Session: 20 min   Stress: No Stress Concern Present (2/26/2024)    [employer] - Occupational Stress Questionnaire     Feeling of Stress : Not at all   Social Connections: Unknown (2/26/2024)    Social Connection and Isolation Panel [NHANES]     Frequency of Social Gatherings with Friends and Family: Patient declined   Interpersonal Safety: Low Risk  (2/28/2024)    Interpersonal Safety     Do you feel physically and emotionally safe where you currently live?: Yes     Within the past 12 months, have you been hit, slapped, kicked or otherwise physically hurt by someone?: No     Within the past 12 months, have you been humiliated or emotionally abused in other ways by your partner or ex-partner?: No   Housing Stability: Low Risk  (2/26/2024)    Housing Stability     Do you have housing? : Yes     Are you worried about losing your housing?: No       Review of Systems:  Skin:        Eyes:       ENT:       Respiratory:  Negative    Cardiovascular:  Negative    Gastroenterology:      Genitourinary:       Musculoskeletal:       Neurologic:       Psychiatric:       Heme/Lymph/Imm:       Endocrine:         Physical Exam:  Vitals: BP (!) 145/67   Pulse 78   Ht 1.638 m (5' 4.5\")   Wt 73.6 kg (162 lb 3.2 oz)   SpO2 100%   BMI 27.41 kg/m      Constitutional:  cooperative, alert and oriented, well developed, well nourished, in no acute distress        Skin:  warm and dry to the touch, no apparent skin lesions or masses noted          Head:  normocephalic, no masses or lesions        Eyes:  pupils equal and round, conjunctivae and lids unremarkable, sclera white, no xanthalasma, EOMS intact, no nystagmus        Lymph:No Cervical lymphadenopathy present     ENT:  no pallor or cyanosis, dentition good        Neck:  carotid pulses are full and equal bilaterally, JVP normal, no carotid bruit        Respiratory:  normal breath sounds, clear to auscultation, normal A-P diameter, normal " symmetry, normal respiratory excursion, no use of accessory muscles         Cardiac: regular rhythm, normal S1/S2, no S3 or S4, apical impulse not displaced, no murmurs, gallops or rubs                pulses full and equal, no bruits auscultated                                        GI:  abdomen soft, non-tender, BS normoactive, no mass, no HSM, no bruits        Extremities and Muscular Skeletal:  no deformities, clubbing, cyanosis, erythema observed              Neurological:  no gross motor deficits        Psych:  Alert and Oriented x 3        Recent Lab Results:  LIPID RESULTS:  Lab Results   Component Value Date    CHOL 118 09/04/2024    CHOL 154 01/20/2021    HDL 44 09/04/2024    HDL 41 01/20/2021    LDL 50 09/04/2024    LDL 65 01/20/2021    TRIG 118 09/04/2024    TRIG 241 (H) 01/20/2021    CHOLHDLRATIO 4.2 06/19/2015       LIVER ENZYME RESULTS:  Lab Results   Component Value Date    AST 32 09/13/2024    AST 23 07/09/2021    ALT 17 09/13/2024    ALT 25 07/09/2021       CBC RESULTS:  Lab Results   Component Value Date    WBC 4.8 09/13/2024    WBC 9.6 07/09/2021    RBC 4.55 09/13/2024    RBC 3.62 (L) 07/09/2021    HGB 11.7 (L) 09/13/2024    HGB 10.9 (L) 07/09/2021    HCT 37.3 (L) 09/13/2024    HCT 32.1 (L) 07/09/2021    MCV 82 09/13/2024    MCV 89 07/09/2021    MCH 25.7 (L) 09/13/2024    MCH 30.1 07/09/2021    MCHC 31.4 (L) 09/13/2024    MCHC 34.0 07/09/2021    RDW 17.6 (H) 09/13/2024    RDW 17.0 (H) 07/09/2021     09/13/2024     (H) 07/09/2021       BMP RESULTS:  Lab Results   Component Value Date     09/13/2024     07/09/2021    POTASSIUM 3.9 09/13/2024    POTASSIUM 3.8 08/16/2022    POTASSIUM 3.7 07/09/2021    CHLORIDE 107 09/13/2024    CHLORIDE 108 08/16/2022    CHLORIDE 107 07/09/2021    CO2 20 (L) 09/13/2024    CO2 22 08/16/2022    CO2 24 07/09/2021    ANIONGAP 12 09/13/2024    ANIONGAP 8 08/16/2022    ANIONGAP 5 07/09/2021     (H) 09/13/2024     (H) 08/16/2022     GLC 91 07/09/2021    BUN 18.2 09/13/2024    BUN 10 08/16/2022    BUN 8 07/09/2021    CR 1.18 (H) 09/13/2024    CR 0.87 07/09/2021    GFRESTIMATED 72 09/13/2024    GFRESTIMATED >90 07/09/2021    GFRESTBLACK >90 07/09/2021    HARDEEP 9.0 09/13/2024    HARDEEP 8.4 (L) 07/09/2021        A1C RESULTS:  Lab Results   Component Value Date    A1C 5.9 (H) 03/01/2024    A1C 6.0 (H) 01/25/2021       INR RESULTS:  Lab Results   Component Value Date    INR 0.98 12/05/2022    INR 0.89 05/09/2022           CC  Naren Cherry MD  [address] [place] [address]

## 2024-10-07 ENCOUNTER — PATIENT OUTREACH (OUTPATIENT)
Dept: ONCOLOGY | Facility: CLINIC | Age: 54
End: 2024-10-07
Payer: COMMERCIAL

## 2024-10-07 DIAGNOSIS — R79.89 LOW TESTOSTERONE IN MALE: ICD-10-CM

## 2024-10-07 RX ORDER — NEEDLES, DISPOSABLE 25GX5/8"
NEEDLE, DISPOSABLE MISCELLANEOUS
Qty: 25 EACH | Refills: 0 | Status: SHIPPED | OUTPATIENT
Start: 2024-10-07

## 2024-10-07 NOTE — TELEPHONE ENCOUNTER
"    Requested Prescriptions   Pending Prescriptions Disp Refills    Needle (Disp) (B-D BLUNT FILL NEEDLE) 18G X 1-1/2\" MISC 25 each 1     Sig: For use with testosterone injection: Disposable syringe and needles, use one 18 G needle to draw up medication then switch to 23 G injection needle       There is no refill protocol information for this order       Needle (Disp) (BD DISP NEEDLE) 23G X 1\" MISC 25 each 1     Sig: For use with testosterone injection: Disposable syringe and needles, use one 18 G needle to draw up medication then switch to 23 G injection needle       There is no refill protocol information for this order       syringe (disposable) 1 ML MISC 25 each 1     Sig: For use with testosterone injection: Disposable syringe and 2 needles, use one 18 G needle to draw up medication then switch to 23 G injection needle       There is no refill protocol information for this order          "

## 2024-10-07 NOTE — PROGRESS NOTES
[facility]: Cancer Care                                                                                          Logan calls to inform us that he has to be at work on Friday 10/11/2024 and is unable to make his provider visit and treatment appointments.  He asks if he can be switched to Wednesday 10/9/2024.  I let him know I would check on this and we would get back to him with a response.    Signature:  Christina Zarate RN

## 2024-10-08 NOTE — PROGRESS NOTES
Virtual Visit Details    Type of service:  Video Visit     Originating Location (pt. Location): Home    Distant Location (provider location):  Off-site  Platform used for Video Visit: [facility]  Date of visit: 10/09/2024        Oncologic History  - 4/30/2021 M spike of 34.8 in urine.M spike in blood 0.2; IgG 702 with depressed IgA and IgM. Beta 2 microglobulin 2.7. Lambda  with K/L ratio of 0.01. WBC 11.1 with absolute eos of 1.0. hgb, plt, Cr (1.1), and albumin WNL.    -4/30/2021 CT abd/pelvis showed sclerotic marrow changes with numerous lytic appearing lesions throughout the imaged portions of the spine, pelvis and ribs.      -PET scan 5/11/2021 Numerous osteolytic lesions which predominantly demonstrate uptake below liver background levels. There is one intraosseous lesion in the left lateral 9th rib that demonstrates uptake above background, possibly due to nondisplaced fracture. Adjacent to this lesion is mild hypermetabolism to the left pleura, with new small left pleural effusion, suspicious for malignant effusion versus posttraumatic effusion. Pleural uptake may represent extramedullary myeloma extending along the intercostal space versus inflammation.    - BM bx 5/17/2021 with flow showing 4.0% plasma cells which express CD19 (dim), CD38 (bright), CD45 (dim), , and monotypic cytoplasmic lambda immunoglobulin light chains but lack CD20 and CD56.  Hypercellular bone marrow for age (approximately 75% cellularity) with adequate trilineage hematopoiesis. Plasma cell infiltrate: Interstitial, lambda monotypic and representing approximately 60% the bone marrow cellularity, by  immunohistochemical stain. Cytpgenetics with 2 related hypodiploid clones. One with loss of Y, monosomy 13 and 14 (5%) and one with translocation of 8p and 14, derivative 16 with long arm replaced by extra copy 1q,monosomy 21. FISH showed gain of 1q, loss of 13 and 14, IGH-MYC  fusion t(8:14)    - Started Miracle-RVd 21 day with velcade on days 1,8,15.     -Cycle 2 Miracle-RVd. Dose reduce dex to 80 mg d/t fatigue and stomach upset on dex days. Also having cough with basilar streaking on CXR    - 8/31/2021 BM bx -rare suspicious plasma cells in touch imprint. No increase in plasma cells by morph.    -8/31/2021 PET/CT Diffuse osteolytic lesions throughout the axial and appendicular skeleton. Increased sclerosis since prior exam 5/11/2021 without increased metabolism or size.  Hypermetabolic pretracheal lymph node as well as hypermetabolic level 2 lymph nodes within the right neck. These lymph nodes are likely reactive from autoimmune activation via medical therapy. Hypermetabolic subcentimeter nodules within the thyroid gland    - 11/11/2021 Autologous BMT    - 2/21/22 started maintenance with Revlimid 10 mg and daratumumab monthly; did Revlimid alone for C1 due to low IgG levels, added daratumumab starting C2.  Initially did Revlimid daily, decreased to 21/28 days in Nov 2023 to help minimize side effects    Interval Hx:   Logan presents to clinic today for toxicity check prior to C36D1.  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 Denies fevers.  He has not taken a COVID test.  No new pain  No other issues or concerns to report.     ROS: 10 point ROS neg other than the symptoms noted above in the HPI.      Video physical exam  General: Patient appears well in no acute distress.   Skin: No visualized rash or lesions on visualized skin  Eyes: EOMI, no erythema, sclera icterus or discharge noted  Resp: Appears to be breathing comfortably without accessory muscle usage, speaking in full sentences, no cough  MSK: Appears to have normal range of motion based on visualized movements  Neurologic: No apparent tremors, facial movements symmetric  Psych: affect bright, alert and oriented        Allergies  "  Allergen Reactions    Blood Transfusion Related (Informational Only) Other (See Comments)     Patient has a history of a clinically significant antibody against RBC antigens.  A delay in compatible RBCs may occur.      Current Outpatient Medications   Medication Sig Dispense Refill    acyclovir (ZOVIRAX) 400 MG tablet Take 400 mg by mouth 2 times daily. 60 tablet 3    albuterol (PROAIR HFA/PROVENTIL HFA/VENTOLIN HFA) 108 (90 Base) MCG/ACT inhaler Inhale 2 puffs into the lungs 4 times daily 18 g 0    aspirin (ASA) 81 MG EC tablet Take 1 tablet (81 mg) by mouth daily      calcium carbonate-vitamin D (OSCAL) 500-5 MG-MCG tablet TAKE ONE TABLET BY MOUTH ONCE DAILY 90 tablet 3    [START ON 10/11/2024] LENalidomide (REVLIMID) 10 MG CAPS capsule Take 1 capsule (10 mg) by mouth daily 21 capsule 0    levothyroxine (SYNTHROID/LEVOTHROID) 112 MCG tablet Take 1 tablet (112 mcg) by mouth daily 90 tablet 1    Needle, Disp, (B-D BLUNT FILL NEEDLE) 18G X 1-1/2\" MISC For use with testosterone injection: Disposable syringe and needles, use one 18 G needle to draw up medication then switch to 23 G injection needle 25 each 0    Needle, Disp, (BD DISP NEEDLE) 23G X 1\" MISC For use with testosterone injection: Disposable syringe and needles, use one 18 G needle to draw up medication then switch to 23 G injection needle 25 each 0    potassium chloride carson ER (KLOR-CON M10) 10 MEQ CR tablet TAKE TWO TABLETS BY MOUTH  DAILY 60 tablet 3    rosuvastatin (CRESTOR) 40 MG tablet Take 1 tablet (40 mg) by mouth daily. 90 tablet 4    syringe, disposable, 1 ML MISC For use with testosterone injection: Disposable syringe and 2 needles, use one 18 G needle to draw up medication then switch to 23 G injection needle 25 each 0    testosterone cypionate (DEPOTESTOSTERONE) 200 MG/ML injection Inject 0.75 mLs (150 mg) into the muscle every 14 days 10 mL 0    vitamin C with B complex (B COMPLEX-C) tablet TAKE ONE TABLET BY MOUTH ONCE DAILY 90 tablet 3 "     No current facility-administered medications for this visit.     Most Recent 3 CBC's:  Recent Labs   Lab Test 09/13/24  1043 08/16/24  1453 07/19/24  1001   WBC 4.8 7.4 4.5   HGB 11.7* 11.7* 11.5*   MCV 82 82 84    427 417   ANEUTAUTO 2.4 3.5 2.2     Most Recent 3 BMP's:  Recent Labs   Lab Test 09/13/24  1043 08/16/24  1453 07/19/24  1001    137 139   POTASSIUM 3.9 4.0 4.6   CHLORIDE 107 105 109*   CO2 20* 22 20*   BUN 18.2 18.1 16.9   CR 1.18* 1.06 1.13   ANIONGAP 12 10 10   HARDEEP 9.0 9.1 8.9   * 107* 155*   PROTTOTAL 6.1* 6.3* 6.1*   ALBUMIN 4.3 4.2 4.2    Most Recent 3 LFT's:  Recent Labs   Lab Test 09/13/24  1043 08/16/24  1453 07/19/24  1001   AST 32 25 36   ALT 17 15 20   ALKPHOS 60 76 66   BILITOTAL 0.4 0.3 0.4    Most Recent 2 TSH and T4:  Recent Labs   Lab Test 07/19/24  1001 01/08/24  0713 12/19/22  1103 06/17/22  1332   TSH 0.05* 0.86   < > 0.07*   T4 1.20  --   --  1.36    < > = values in this interval not displayed.         Assessment and Plan  Multiple myeloma with high risk cytogenetics  - Miracle-RVD with VGPR, then underwent autologous transplant 11/11/21.   - Given his high risk cytogenetics he started miracle + revlimid maintenance therapy 2/21/22---ok to proceed with C36D1 today 10/9/24 as long as his home COVID test is negative  - Completed 2 years of zometa in Sept 2023. Continue Vit D.   - Revlimid was decreased to 21/28 days in Nov 2023 to try to minimize side effects (dysgeusia, flu-like symptoms on days following daratumumab)  - He is currently tolerating treatment well without side-effects.     Ppx/ID  - Continue acyclovir daily for viral ppx   - COVID + 10/19/23.  Received COVID booster 02/02/24   - Pneumococcal vaccine given 02/02/24   - Got both Shingrix vaccines 3/4/24 and 5/7/2024  - Hep A vaccine given 5/14/24--to be repeated in 12 weeks  - MMR vaccine 5/14/24  - Will hold off on his influena and covid vaccines until his current URI symptoms have  resolved.    Hypogammaglobulinemia   Start monthly IV Ig if IgG <300  - 9/13/24 IgG 511  - If continues to have repeat URI could consider raising parameters for IVIG    Normocytic Anemia:   - Likely d/t revlimid and daratumumab.   - Will keep monitoring and dose reduce if needed.    - Hgb stable at this time     Sinus congestion  Cough  Symptoms are mild.  He will take a home COVID test following our appointment.  As long as that is negative, he can proceed with his daratumumab later today.  Discussed infection prevention strategies such as frequent hand washing and masks - he is good about doing these.      20 minutes spent on the date of the encounter doing chart review, review of test results, interpretation of tests, patient visit, and documentation     The longitudinal plan of care for the diagnosis(es)/condition(s) as documented were addressed during this visit. Due to the added complexity in care, I will continue to support June in the subsequent management and with ongoing continuity of care.    PATTY Meza [facility] [place] [id number] [place], [place] [address] [phone]

## 2024-10-09 ENCOUNTER — VIRTUAL VISIT (OUTPATIENT)
Dept: ONCOLOGY | Facility: CLINIC | Age: 54
End: 2024-10-09
Attending: REGISTERED NURSE
Payer: COMMERCIAL

## 2024-10-09 ENCOUNTER — INFUSION THERAPY VISIT (OUTPATIENT)
Dept: ONCOLOGY | Facility: CLINIC | Age: 54
End: 2024-10-09
Attending: OPTOMETRIST
Payer: COMMERCIAL

## 2024-10-09 ENCOUNTER — APPOINTMENT (OUTPATIENT)
Dept: LAB | Facility: CLINIC | Age: 54
End: 2024-10-09
Attending: REGISTERED NURSE
Payer: COMMERCIAL

## 2024-10-09 VITALS
RESPIRATION RATE: 18 BRPM | TEMPERATURE: 98.7 F | OXYGEN SATURATION: 100 % | BODY MASS INDEX: 25.84 KG/M2 | HEART RATE: 69 BPM | SYSTOLIC BLOOD PRESSURE: 124 MMHG | DIASTOLIC BLOOD PRESSURE: 69 MMHG | WEIGHT: 160.1 LBS

## 2024-10-09 VITALS — HEIGHT: 66 IN | WEIGHT: 152 LBS | BODY MASS INDEX: 24.43 KG/M2

## 2024-10-09 DIAGNOSIS — C90.00 MULTIPLE MYELOMA NOT HAVING ACHIEVED REMISSION (H): Primary | ICD-10-CM

## 2024-10-09 DIAGNOSIS — C90.01 MULTIPLE MYELOMA IN REMISSION (H): ICD-10-CM

## 2024-10-09 LAB
ALBUMIN SERPL BCG-MCNC: 4 G/DL (ref 3.5–5.2)
ALP SERPL-CCNC: 65 U/L (ref 40–150)
ALT SERPL W P-5'-P-CCNC: 23 U/L (ref 0–70)
ANION GAP SERPL CALCULATED.3IONS-SCNC: 10 MMOL/L (ref 7–15)
AST SERPL W P-5'-P-CCNC: 32 U/L (ref 0–45)
BASOPHILS # BLD AUTO: 0.1 10E3/UL (ref 0–0.2)
BASOPHILS NFR BLD AUTO: 1 %
BILIRUB SERPL-MCNC: 0.2 MG/DL
BUN SERPL-MCNC: 13.7 MG/DL (ref 6–20)
CALCIUM SERPL-MCNC: 8.6 MG/DL (ref 8.8–10.4)
CHLORIDE SERPL-SCNC: 104 MMOL/L (ref 98–107)
CREAT SERPL-MCNC: 1.35 MG/DL (ref 0.67–1.17)
EGFRCR SERPLBLD CKD-EPI 2021: 62 ML/MIN/1.73M2
EOSINOPHIL # BLD AUTO: 0.3 10E3/UL (ref 0–0.7)
EOSINOPHIL NFR BLD AUTO: 4 %
ERYTHROCYTE [DISTWIDTH] IN BLOOD BY AUTOMATED COUNT: 18.7 % (ref 10–15)
GLUCOSE SERPL-MCNC: 104 MG/DL (ref 70–99)
HCO3 SERPL-SCNC: 23 MMOL/L (ref 22–29)
HCT VFR BLD AUTO: 34.5 % (ref 40–53)
HGB BLD-MCNC: 11 G/DL (ref 13.3–17.7)
IMM GRANULOCYTES # BLD: 0 10E3/UL
IMM GRANULOCYTES NFR BLD: 0 %
LYMPHOCYTES # BLD AUTO: 1.7 10E3/UL (ref 0.8–5.3)
LYMPHOCYTES NFR BLD AUTO: 26 %
MCH RBC QN AUTO: 26.1 PG (ref 26.5–33)
MCHC RBC AUTO-ENTMCNC: 31.9 G/DL (ref 31.5–36.5)
MCV RBC AUTO: 82 FL (ref 78–100)
MONOCYTES # BLD AUTO: 1 10E3/UL (ref 0–1.3)
MONOCYTES NFR BLD AUTO: 15 %
NEUTROPHILS # BLD AUTO: 3.5 10E3/UL (ref 1.6–8.3)
NEUTROPHILS NFR BLD AUTO: 54 %
NRBC # BLD AUTO: 0 10E3/UL
NRBC BLD AUTO-RTO: 0 /100
PLATELET # BLD AUTO: 436 10E3/UL (ref 150–450)
POTASSIUM SERPL-SCNC: 3.7 MMOL/L (ref 3.4–5.3)
PROT SERPL-MCNC: 6.1 G/DL (ref 6.4–8.3)
RBC # BLD AUTO: 4.22 10E6/UL (ref 4.4–5.9)
SODIUM SERPL-SCNC: 137 MMOL/L (ref 135–145)
TOTAL PROTEIN SERUM FOR ELP: 5.9 G/DL (ref 6.4–8.3)
WBC # BLD AUTO: 6.5 10E3/UL (ref 4–11)

## 2024-10-09 PROCEDURE — 83521 IG LIGHT CHAINS FREE EACH: CPT

## 2024-10-09 PROCEDURE — 82784 ASSAY IGA/IGD/IGG/IGM EACH: CPT

## 2024-10-09 PROCEDURE — 36415 COLL VENOUS BLD VENIPUNCTURE: CPT

## 2024-10-09 PROCEDURE — 84155 ASSAY OF PROTEIN SERUM: CPT

## 2024-10-09 PROCEDURE — 250N000011 HC RX IP 250 OP 636: Mod: JZ | Performed by: REGISTERED NURSE

## 2024-10-09 PROCEDURE — 80053 COMPREHEN METABOLIC PANEL: CPT

## 2024-10-09 PROCEDURE — 96401 CHEMO ANTI-NEOPL SQ/IM: CPT

## 2024-10-09 PROCEDURE — G2211 COMPLEX E/M VISIT ADD ON: HCPCS | Mod: 95 | Performed by: REGISTERED NURSE

## 2024-10-09 PROCEDURE — 84165 PROTEIN E-PHORESIS SERUM: CPT | Mod: 26 | Performed by: PATHOLOGY

## 2024-10-09 PROCEDURE — 86334 IMMUNOFIX E-PHORESIS SERUM: CPT | Performed by: PATHOLOGY

## 2024-10-09 PROCEDURE — 84165 PROTEIN E-PHORESIS SERUM: CPT | Mod: TC | Performed by: PATHOLOGY

## 2024-10-09 PROCEDURE — 99214 OFFICE O/P EST MOD 30 MIN: CPT | Mod: 95 | Performed by: REGISTERED NURSE

## 2024-10-09 PROCEDURE — 86334 IMMUNOFIX E-PHORESIS SERUM: CPT | Mod: 26 | Performed by: PATHOLOGY

## 2024-10-09 PROCEDURE — 85004 AUTOMATED DIFF WBC COUNT: CPT

## 2024-10-09 RX ORDER — DIPHENHYDRAMINE HYDROCHLORIDE 50 MG/ML
50 INJECTION INTRAMUSCULAR; INTRAVENOUS
Status: CANCELLED
Start: 2024-10-11

## 2024-10-09 RX ORDER — EPINEPHRINE 1 MG/ML
0.3 INJECTION, SOLUTION INTRAMUSCULAR; SUBCUTANEOUS EVERY 5 MIN PRN
Status: CANCELLED | OUTPATIENT
Start: 2024-10-11

## 2024-10-09 RX ORDER — HEPARIN SODIUM,PORCINE 10 UNIT/ML
5 VIAL (ML) INTRAVENOUS
Status: CANCELLED | OUTPATIENT
Start: 2024-10-11

## 2024-10-09 RX ORDER — ALBUTEROL SULFATE 0.83 MG/ML
2.5 SOLUTION RESPIRATORY (INHALATION)
Status: CANCELLED | OUTPATIENT
Start: 2024-10-11

## 2024-10-09 RX ORDER — METHYLPREDNISOLONE SODIUM SUCCINATE 125 MG/2ML
125 INJECTION INTRAMUSCULAR; INTRAVENOUS
Status: CANCELLED
Start: 2024-10-11

## 2024-10-09 RX ORDER — ALBUTEROL SULFATE 90 UG/1
1-2 INHALANT RESPIRATORY (INHALATION)
Status: CANCELLED
Start: 2024-10-11

## 2024-10-09 RX ORDER — MEPERIDINE HYDROCHLORIDE 25 MG/ML
25 INJECTION INTRAMUSCULAR; INTRAVENOUS; SUBCUTANEOUS EVERY 30 MIN PRN
Status: CANCELLED | OUTPATIENT
Start: 2024-10-11

## 2024-10-09 RX ORDER — HEPARIN SODIUM (PORCINE) LOCK FLUSH IV SOLN 100 UNIT/ML 100 UNIT/ML
5 SOLUTION INTRAVENOUS
Status: CANCELLED | OUTPATIENT
Start: 2024-10-11

## 2024-10-09 RX ORDER — NALOXONE HYDROCHLORIDE 0.4 MG/ML
0.2 INJECTION, SOLUTION INTRAMUSCULAR; INTRAVENOUS; SUBCUTANEOUS
Status: CANCELLED | OUTPATIENT
Start: 2024-10-11

## 2024-10-09 RX ORDER — ACETAMINOPHEN 325 MG/1
650 TABLET ORAL ONCE
Status: CANCELLED
Start: 2024-10-11 | End: 2024-10-11

## 2024-10-09 RX ORDER — DEXAMETHASONE 4 MG/1
12 TABLET ORAL ONCE
Status: CANCELLED
Start: 2024-10-11 | End: 2024-10-11

## 2024-10-09 RX ORDER — DIPHENHYDRAMINE HCL 25 MG
50 CAPSULE ORAL ONCE
Status: CANCELLED
Start: 2024-10-11 | End: 2024-10-11

## 2024-10-09 RX ADMIN — DARATUMUMAB AND HYALURONIDASE-FIHJ (HUMAN RECOMBINANT) 1800 MG: 1800; 30000 INJECTION SUBCUTANEOUS at 16:12

## 2024-10-09 ASSESSMENT — PAIN SCALES - GENERAL
PAINLEVEL: NO PAIN (0)
PAINLEVEL: NO PAIN (0)

## 2024-10-09 NOTE — NURSING NOTE
Patient confirms medications and allergies are accurate via patients echeck in completion, and or denies any changes since last reviewed/verified.       Current patient location: [address] [place] [id number]    Is the patient currently in the state of [place]? YES    Visit mode:VIDEO    If the visit is dropped, the patient can be reconnected by: VIDEO VISIT: Text to cell phone:   Telephone Information:   Mobile [phone]       Will anyone else be joining the visit? NO  (If patient encounters technical issues they should call [phone])    Are changes needed to the allergy or medication list? No    Are refills needed on medications prescribed by this physician? NO    Rooming Documentation:  Questionnaire(s) completed    Reason for visit: RECHECK    Trish MCADAMS

## 2024-10-09 NOTE — LETTER
10/9/2024      June Ronquillo  [address]  [facility] [address]      Dear Colleague,    Thank you for referring your patient, June Ronquillo, to the [facility]. Please see a copy of my visit note below.    Virtual Visit Details    Type of service:  Video Visit     Originating Location (pt. Location): Home    Distant Location (provider location):  Off-site  Platform used for Video Visit: Seen    [facility] [place]  Date of visit: 10/09/2024        Oncologic History  - 4/30/2021 M spike of 34.8 in urine.M spike in blood 0.2; IgG 702 with depressed IgA and IgM. Beta 2 microglobulin 2.7. Lambda  with K/L ratio of 0.01. WBC 11.1 with absolute eos of 1.0. hgb, plt, Cr (1.1), and albumin WNL.    -4/30/2021 CT abd/pelvis showed sclerotic marrow changes with numerous lytic appearing lesions throughout the imaged portions of the spine, pelvis and ribs.      -PET scan 5/11/2021 Numerous osteolytic lesions which predominantly demonstrate uptake below liver background levels. There is one intraosseous lesion in the left lateral 9th rib that demonstrates uptake above background, possibly due to nondisplaced fracture. Adjacent to this lesion is mild hypermetabolism to the left pleura, with new small left pleural effusion, suspicious for malignant effusion versus posttraumatic effusion. Pleural uptake may represent extramedullary myeloma extending along the intercostal space versus inflammation.    - BM bx 5/17/2021 with flow showing 4.0% plasma cells which express CD19 (dim), CD38 (bright), CD45 (dim), , and monotypic cytoplasmic lambda immunoglobulin light chains but lack CD20 and CD56.  Hypercellular bone marrow for age (approximately 75% cellularity) with adequate trilineage hematopoiesis. Plasma cell infiltrate: Interstitial, lambda monotypic and representing approximately 60% the bone marrow cellularity, by  immunohistochemical  stain. Cytpgenetics with 2 related hypodiploid clones. One with loss of Y, monosomy 13 and 14 (5%) and one with translocation of 8p and 14, derivative 16 with long arm replaced by extra copy 1q,monosomy 21. FISH showed gain of 1q, loss of 13 and 14, IGH-MYC fusion t(8:14)    - Started Miracle-RVd 21 day with velcade on days 1,8,15.     -Cycle 2 Miracle-RVd. Dose reduce dex to 80 mg d/t fatigue and stomach upset on dex days. Also having cough with basilar streaking on CXR    - 8/31/2021 BM bx -rare suspicious plasma cells in touch imprint. No increase in plasma cells by morph.    -8/31/2021 PET/CT Diffuse osteolytic lesions throughout the axial and appendicular skeleton. Increased sclerosis since prior exam 5/11/2021 without increased metabolism or size.  Hypermetabolic pretracheal lymph node as well as hypermetabolic level 2 lymph nodes within the right neck. These lymph nodes are likely reactive from autoimmune activation via medical therapy. Hypermetabolic subcentimeter nodules within the thyroid gland    - 11/11/2021 Autologous BMT    - 2/21/22 started maintenance with Revlimid 10 mg and daratumumab monthly; did Revlimid alone for C1 due to low IgG levels, added daratumumab starting C2.  Initially did Revlimid daily, decreased to 21/28 days in Nov 2023 to help minimize side effects    Interval Hx:   Logan presents to clinic today for toxicity check prior to C36D1.  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 Denies fevers.  He has not taken a COVID test.  No new pain  No other issues or concerns to report.     ROS: 10 point ROS neg other than the symptoms noted above in the HPI.      Video physical exam  General: Patient appears well in no acute distress.   Skin: No visualized rash or lesions on visualized skin  Eyes: EOMI, no erythema, sclera icterus or discharge noted  Resp: Appears to be breathing comfortably without  "accessory muscle usage, speaking in full sentences, no cough  MSK: Appears to have normal range of motion based on visualized movements  Neurologic: No apparent tremors, facial movements symmetric  Psych: affect bright, alert and oriented        Allergies   Allergen Reactions     Blood Transfusion Related (Informational Only) Other (See Comments)     Patient has a history of a clinically significant antibody against RBC antigens.  A delay in compatible RBCs may occur.      Current Outpatient Medications   Medication Sig Dispense Refill     acyclovir (ZOVIRAX) 400 MG tablet Take 400 mg by mouth 2 times daily. 60 tablet 3     albuterol (PROAIR HFA/PROVENTIL HFA/VENTOLIN HFA) 108 (90 Base) MCG/ACT inhaler Inhale 2 puffs into the lungs 4 times daily 18 g 0     aspirin (ASA) 81 MG EC tablet Take 1 tablet (81 mg) by mouth daily       calcium carbonate-vitamin D (OSCAL) 500-5 MG-MCG tablet TAKE ONE TABLET BY MOUTH ONCE DAILY 90 tablet 3     [START ON 10/11/2024] LENalidomide (REVLIMID) 10 MG CAPS capsule Take 1 capsule (10 mg) by mouth daily 21 capsule 0     levothyroxine (SYNTHROID/LEVOTHROID) 112 MCG tablet Take 1 tablet (112 mcg) by mouth daily 90 tablet 1     Needle, Disp, (B-D BLUNT FILL NEEDLE) 18G X 1-1/2\" MISC For use with testosterone injection: Disposable syringe and needles, use one 18 G needle to draw up medication then switch to 23 G injection needle 25 each 0     Needle, Disp, (BD DISP NEEDLE) 23G X 1\" MISC For use with testosterone injection: Disposable syringe and needles, use one 18 G needle to draw up medication then switch to 23 G injection needle 25 each 0     potassium chloride carson ER (KLOR-CON M10) 10 MEQ CR tablet TAKE TWO TABLETS BY MOUTH  DAILY 60 tablet 3     rosuvastatin (CRESTOR) 40 MG tablet Take 1 tablet (40 mg) by mouth daily. 90 tablet 4     syringe, disposable, 1 ML MISC For use with testosterone injection: Disposable syringe and 2 needles, use one 18 G needle to draw up medication then " switch to 23 G injection needle 25 each 0     testosterone cypionate (DEPOTESTOSTERONE) 200 MG/ML injection Inject 0.75 mLs (150 mg) into the muscle every 14 days 10 mL 0     vitamin C with B complex (B COMPLEX-C) tablet TAKE ONE TABLET BY MOUTH ONCE DAILY 90 tablet 3     No current facility-administered medications for this visit.     Most Recent 3 CBC's:  Recent Labs   Lab Test 09/13/24  1043 08/16/24  1453 07/19/24  1001   WBC 4.8 7.4 4.5   HGB 11.7* 11.7* 11.5*   MCV 82 82 84    427 417   ANEUTAUTO 2.4 3.5 2.2     Most Recent 3 BMP's:  Recent Labs   Lab Test 09/13/24  1043 08/16/24  1453 07/19/24  1001    137 139   POTASSIUM 3.9 4.0 4.6   CHLORIDE 107 105 109*   CO2 20* 22 20*   BUN 18.2 18.1 16.9   CR 1.18* 1.06 1.13   ANIONGAP 12 10 10   HARDEEP 9.0 9.1 8.9   * 107* 155*   PROTTOTAL 6.1* 6.3* 6.1*   ALBUMIN 4.3 4.2 4.2    Most Recent 3 LFT's:  Recent Labs   Lab Test 09/13/24  1043 08/16/24  1453 07/19/24  1001   AST 32 25 36   ALT 17 15 20   ALKPHOS 60 76 66   BILITOTAL 0.4 0.3 0.4    Most Recent 2 TSH and T4:  Recent Labs   Lab Test 07/19/24  1001 01/08/24  0713 12/19/22  1103 06/17/22  1332   TSH 0.05* 0.86   < > 0.07*   T4 1.20  --   --  1.36    < > = values in this interval not displayed.         Assessment and Plan  Multiple myeloma with high risk cytogenetics  - Miracle-RVD with VGPR, then underwent autologous transplant 11/11/21.   - Given his high risk cytogenetics he started miracle + revlimid maintenance therapy 2/21/22---ok to proceed with C36D1 today 10/9/24 as long as his home COVID test is negative  - Completed 2 years of zometa in Sept 2023. Continue Vit D.   - Revlimid was decreased to 21/28 days in Nov 2023 to try to minimize side effects (dysgeusia, flu-like symptoms on days following daratumumab)  - He is currently tolerating treatment well without side-effects.     Ppx/ID  - Continue acyclovir daily for viral ppx   - COVID + 10/19/23.  Received COVID booster 02/02/24   -  Pneumococcal vaccine given 02/02/24   - Got both Shingrix vaccines 3/4/24 and 5/7/2024  - Hep A vaccine given 5/14/24--to be repeated in 12 weeks  - MMR vaccine 5/14/24  - Will hold off on his influena and covid vaccines until his current URI symptoms have resolved.    Hypogammaglobulinemia   Start monthly IV Ig if IgG <300  - 9/13/24 IgG 511  - If continues to have repeat URI could consider raising parameters for IVIG    Normocytic Anemia:   - Likely d/t revlimid and daratumumab.   - Will keep monitoring and dose reduce if needed.    - Hgb stable at this time     Sinus congestion  Cough  Symptoms are mild.  He will take a home COVID test following our appointment.  As long as that is negative, he can proceed with his daratumumab later today.  Discussed infection prevention strategies such as frequent hand washing and masks - he is good about doing these.      20 minutes spent on the date of the encounter doing chart review, review of test results, interpretation of tests, patient visit, and documentation     The longitudinal plan of care for the diagnosis(es)/condition(s) as documented were addressed during this visit. Due to the added complexity in care, I will continue to support June in the subsequent management and with ongoing continuity of care.    PATTY Meza CNP  [facility] [address] [address] [phone]      Again, thank you for allowing me to participate in the care of your patient.        Sincerely,        PATTY Landaverde CNP

## 2024-10-09 NOTE — PROGRESS NOTES
Infusion Nursing Note:  Kurtisdorianmatthias Ronquillo presents today for Cycle 36, Day 1 Darzalex Faspro injection.    Patient seen by provider today: Yes: Laura Ballesteros CNP   present during visit today: Not Applicable.    Note: N/A.      Treatment Conditions:  Lab Results   Component Value Date    HGB 11.0 (L) 10/09/2024    WBC 6.5 10/09/2024    ANEU 4.7 03/13/2023    ANEUTAUTO 3.5 10/09/2024     10/09/2024     Post Infusion Assessment:  Patient tolerated injection to right lower abdomen without incident.       Discharge Plan:   Patient declined prescription refills.  AVS to patient via [employer].  Patient will return 11/8/24 for next appointment.   Patient discharged in stable condition accompanied by: self.  Departure Mode: Ambulatory.      Ema Bowden RN

## 2024-10-10 LAB
ALBUMIN SERPL ELPH-MCNC: 3.7 G/DL (ref 3.7–5.1)
ALPHA1 GLOB SERPL ELPH-MCNC: 0.3 G/DL (ref 0.2–0.4)
ALPHA2 GLOB SERPL ELPH-MCNC: 0.7 G/DL (ref 0.5–0.9)
B-GLOBULIN SERPL ELPH-MCNC: 0.7 G/DL (ref 0.6–1)
GAMMA GLOB SERPL ELPH-MCNC: 0.5 G/DL (ref 0.7–1.6)
IGA SERPL-MCNC: 56 MG/DL (ref 84–499)
IGG SERPL-MCNC: 457 MG/DL (ref 610–1616)
IGM SERPL-MCNC: 36 MG/DL (ref 35–242)
KAPPA LC FREE SER-MCNC: 0.82 MG/DL (ref 0.33–1.94)
KAPPA LC FREE/LAMBDA FREE SER NEPH: 0.88 {RATIO} (ref 0.26–1.65)
LAMBDA LC FREE SERPL-MCNC: 0.93 MG/DL (ref 0.57–2.63)
LOCATION OF TASK: ABNORMAL
LOCATION OF TASK: NORMAL
M PROTEIN SERPL ELPH-MCNC: 0.1 G/DL
PROT PATTERN SERPL ELPH-IMP: ABNORMAL
PROT PATTERN SERPL IFE-IMP: NORMAL

## 2024-10-14 ENCOUNTER — MYC REFILL (OUTPATIENT)
Dept: ONCOLOGY | Facility: CLINIC | Age: 54
End: 2024-10-14
Payer: COMMERCIAL

## 2024-10-14 DIAGNOSIS — C90.00 MULTIPLE MYELOMA NOT HAVING ACHIEVED REMISSION (H): ICD-10-CM

## 2024-10-15 RX ORDER — POTASSIUM CHLORIDE 750 MG/1
20 TABLET, EXTENDED RELEASE ORAL DAILY
Qty: 60 TABLET | Refills: 3 | Status: SHIPPED | OUTPATIENT
Start: 2024-10-15

## 2024-10-15 NOTE — TELEPHONE ENCOUNTER
Medication:Potassium Chloride  Last prescribing provider:Dr. Vazquez  Last clinic visit date: 10/9/2024 Laura Ballesteros  Recommendations for requested medication:supplement  Any other pertinent information:routed to last provider Laura

## 2024-10-24 DIAGNOSIS — C90.00 MULTIPLE MYELOMA NOT HAVING ACHIEVED REMISSION (H): ICD-10-CM

## 2024-10-24 RX ORDER — LENALIDOMIDE 10 MG/1
CAPSULE ORAL
Refills: 0 | OUTPATIENT
Start: 2024-10-24

## 2024-10-25 ENCOUNTER — TELEPHONE (OUTPATIENT)
Dept: ONCOLOGY | Facility: CLINIC | Age: 54
End: 2024-10-25
Payer: COMMERCIAL

## 2024-10-25 DIAGNOSIS — C90.00 MULTIPLE MYELOMA NOT HAVING ACHIEVED REMISSION (H): Primary | ICD-10-CM

## 2024-10-25 RX ORDER — LENALIDOMIDE 10 MG/1
10 CAPSULE ORAL DAILY
Qty: 21 CAPSULE | Refills: 0 | Status: SHIPPED | OUTPATIENT
Start: 2024-11-08

## 2024-10-25 NOTE — TELEPHONE ENCOUNTER
Oral Chemotherapy Monitoring Program   Medication: Revlimid  Rx: 10mg PO daily on days 1 through 21 of 28 day cycle   Auth #: [id number]   Risk Category: Adult Male  Routine survey questions reviewed.   Rx to be Escribed to [facility]     Yu Goldberg  [facility]  Oncology Pharmacy Liaison   [email or web address]  [phone] (phone)  [phone] (fax)

## 2024-10-28 ENCOUNTER — LAB (OUTPATIENT)
Dept: LAB | Facility: CLINIC | Age: 54
End: 2024-10-28
Attending: INTERNAL MEDICINE
Payer: COMMERCIAL

## 2024-10-28 DIAGNOSIS — C90.00 MULTIPLE MYELOMA NOT HAVING ACHIEVED REMISSION (H): Primary | ICD-10-CM

## 2024-10-28 LAB
HOLD SPECIMEN: NORMAL

## 2024-10-28 NOTE — NURSING NOTE
Chief Complaint   Patient presents with    Labs Only     Labs drawn from venipuncture by RN in lab     No current lab orders to be drawn. Pt reports expecting lab orders from Dr. Elder. C rainbow tubes collected per pt request. Dr. Elder notified of no lab orders. Labs collected from venipuncture by ZACK.     July Pope RN

## 2024-10-30 NOTE — LETTER
5/9/2022     RE: June Ronquillo  [address]  [facility] [address]      Dear Colleague,    Thank you for referring your patient, June Ronquillo, to the [facility] BLOOD AND MARROW TRANSPLANT PROGRAM [place]. Please see a copy of my visit note below.    BMT ONC Adult Bone Marrow Biopsy Procedure Note  May 9, 2022   VSS    Learning needs assessment complete within 12 months? YES    DIAGNOSIS: multiple myeloma     PROCEDURE: Unilateral Bone Marrow Biopsy and Unilateral Aspirate    LOCATION: [facility] 5th floor-Procedure Room    Patient s identification was positively verified by verbal identification and invasive procedure safety checklist was completed. Informed consent was obtained. Following the administration of Propofol as pre-medication, patient was placed in the prone position and prepped and draped in a sterile manner. Approximately 10 cc of 1% Lidocaine was used over the right posterior iliac spine. Following this a 3 mm incision was made. Trephine bone marrow core(s) was (were) obtained from the [facility]. Bone marrow aspirates were obtained from the [facility]. Aspirates were sent for morphology, immunophenotyping, cytogenetics and molecular diagnostics RFLP. A total of approximately 20 ml of marrow was aspirated. Following this procedure a sterile dressing was applied to the bone marrow biopsy site(s). The patient was placed in the supine position to maintain pressure on the biopsy site. Post-procedure wound care instructions were given.     Complications: NO    Pre-procedural pain: 0 out of 10 on the numeric pain rating scale.     Procedural pain: 0 out of 10 on the numeric pain rating scale, pt well sedated    Post-procedural pain assessment: 0 out of 10 on the numeric pain rating scale.     Interventions: NO    Length of procedure:20 minutes or less      Procedure performed by: Deborah Leblanc [facility]  [phone]        Again, thank you for allowing me to participate in the care of your patient.  I returned Zaid's call.  He went to the ED due to unilateral swelling of his lower extremity.  He denies injury to the leg.  He states that in the ED they did ultrasound and he does not have blood clots.  I reminded him to elevate the leg, monitor for redness and for heat, and should it develop either of those he may need antibiotics for cellulitis.  He is to contact his PCP if that should occur.  He stated understanding.          Sincerely,        [facility]

## 2024-11-06 ENCOUNTER — IMMUNIZATION (OUTPATIENT)
Dept: FAMILY MEDICINE | Facility: CLINIC | Age: 54
End: 2024-11-06
Payer: COMMERCIAL

## 2024-11-06 DIAGNOSIS — Z23 ENCOUNTER FOR IMMUNIZATION: Primary | ICD-10-CM

## 2024-11-06 PROCEDURE — 99207 PR NO CHARGE NURSE ONLY: CPT

## 2024-11-06 PROCEDURE — 91320 SARSCV2 VAC 30MCG TRS-SUC IM: CPT

## 2024-11-06 PROCEDURE — 90673 RIV3 VACCINE NO PRESERV IM: CPT

## 2024-11-06 PROCEDURE — 90471 IMMUNIZATION ADMIN: CPT

## 2024-11-06 PROCEDURE — 90480 ADMN SARSCOV2 VAC 1/ONLY CMP: CPT

## 2024-11-06 NOTE — PROGRESS NOTES
Prior to immunization administration, verified patients identity using patient s name and date of birth. Please see Immunization Activity for additional information.     Is the patient's temperature normal (100.5 or less)? Yes     Patient MEETS CRITERIA. PROCEED with vaccine administration.          11/6/2024   COVID   Have you had myocarditis or pericarditis (inflammation of or around the heart muscle) after getting a COVID-19 vaccine? No   Have you had a serious reaction to a COVID vaccine or something in a COVID vaccine, like polyethylene glycol (PEG) or polysorbate? No   Have you had multisystem inflammatory syndrome from COVID-19 in the past 90 days? No   Have you received a bone marrow transplant within the previous 3 months? No            Patient MEETS CRITERIA. PROCEED with vaccine administration.            11/6/2024   INFLUENZA   Would you like to receive the flu shot or the nasal flu vaccine today? Flu Shot   Have you had a serious reaction to a flu vaccine or something in a flu vaccine? No   Have you had Guillain-Realitos syndrome within 6 weeks of getting a vaccine? No   Have you received a bone marrow transplant within the previous 6 months? No            Patient MEETS CRITERIA. PROCEED with vaccine administration.        Patient instructed to remain in clinic for 15 minutes afterwards, and to report any adverse reactions.      Link to Ancillary Visit Immunization Standing Orders SmartSet     Screening performed by Madyson Naranjo MA on 11/6/2024 at 2:55 PM.

## 2024-11-08 ENCOUNTER — ONCOLOGY VISIT (OUTPATIENT)
Dept: ONCOLOGY | Facility: CLINIC | Age: 54
End: 2024-11-08
Attending: REGISTERED NURSE
Payer: COMMERCIAL

## 2024-11-08 ENCOUNTER — APPOINTMENT (OUTPATIENT)
Dept: LAB | Facility: CLINIC | Age: 54
End: 2024-11-08
Attending: REGISTERED NURSE
Payer: COMMERCIAL

## 2024-11-08 VITALS
SYSTOLIC BLOOD PRESSURE: 124 MMHG | BODY MASS INDEX: 25.79 KG/M2 | RESPIRATION RATE: 18 BRPM | WEIGHT: 159.8 LBS | TEMPERATURE: 97.9 F | DIASTOLIC BLOOD PRESSURE: 86 MMHG | HEART RATE: 77 BPM | OXYGEN SATURATION: 100 %

## 2024-11-08 DIAGNOSIS — C90.00 MULTIPLE MYELOMA NOT HAVING ACHIEVED REMISSION (H): Primary | ICD-10-CM

## 2024-11-08 DIAGNOSIS — C90.00 MULTIPLE MYELOMA NOT HAVING ACHIEVED REMISSION (H): ICD-10-CM

## 2024-11-08 DIAGNOSIS — C73 PAPILLARY MICROCARCINOMA OF THYROID (H): ICD-10-CM

## 2024-11-08 LAB
ALBUMIN SERPL BCG-MCNC: 4.2 G/DL (ref 3.5–5.2)
ALP SERPL-CCNC: 70 U/L (ref 40–150)
ALT SERPL W P-5'-P-CCNC: 13 U/L (ref 0–70)
ANION GAP SERPL CALCULATED.3IONS-SCNC: 10 MMOL/L (ref 7–15)
AST SERPL W P-5'-P-CCNC: 26 U/L (ref 0–45)
BASOPHILS # BLD AUTO: 0.1 10E3/UL (ref 0–0.2)
BASOPHILS NFR BLD AUTO: 1 %
BILIRUB SERPL-MCNC: 0.3 MG/DL
BUN SERPL-MCNC: 14.8 MG/DL (ref 6–20)
CALCIUM SERPL-MCNC: 9.1 MG/DL (ref 8.8–10.4)
CHLORIDE SERPL-SCNC: 105 MMOL/L (ref 98–107)
CREAT SERPL-MCNC: 1.17 MG/DL (ref 0.67–1.17)
EGFRCR SERPLBLD CKD-EPI 2021: 73 ML/MIN/1.73M2
EOSINOPHIL # BLD AUTO: 0.4 10E3/UL (ref 0–0.7)
EOSINOPHIL NFR BLD AUTO: 7 %
ERYTHROCYTE [DISTWIDTH] IN BLOOD BY AUTOMATED COUNT: 19.4 % (ref 10–15)
GLUCOSE SERPL-MCNC: 98 MG/DL (ref 70–99)
HCO3 SERPL-SCNC: 21 MMOL/L (ref 22–29)
HCT VFR BLD AUTO: 37.1 % (ref 40–53)
HGB BLD-MCNC: 12 G/DL (ref 13.3–17.7)
IMM GRANULOCYTES # BLD: 0 10E3/UL
IMM GRANULOCYTES NFR BLD: 0 %
LYMPHOCYTES # BLD AUTO: 1.3 10E3/UL (ref 0.8–5.3)
LYMPHOCYTES NFR BLD AUTO: 21 %
MCH RBC QN AUTO: 26.3 PG (ref 26.5–33)
MCHC RBC AUTO-ENTMCNC: 32.3 G/DL (ref 31.5–36.5)
MCV RBC AUTO: 81 FL (ref 78–100)
MONOCYTES # BLD AUTO: 1 10E3/UL (ref 0–1.3)
MONOCYTES NFR BLD AUTO: 17 %
NEUTROPHILS # BLD AUTO: 3.2 10E3/UL (ref 1.6–8.3)
NEUTROPHILS NFR BLD AUTO: 54 %
NRBC # BLD AUTO: 0 10E3/UL
NRBC BLD AUTO-RTO: 0 /100
PLATELET # BLD AUTO: 368 10E3/UL (ref 150–450)
POTASSIUM SERPL-SCNC: 4.2 MMOL/L (ref 3.4–5.3)
PROT SERPL-MCNC: 6.6 G/DL (ref 6.4–8.3)
RBC # BLD AUTO: 4.56 10E6/UL (ref 4.4–5.9)
SODIUM SERPL-SCNC: 136 MMOL/L (ref 135–145)
TOTAL PROTEIN SERUM FOR ELP: 6.3 G/DL (ref 6.4–8.3)
TSH SERPL DL<=0.005 MIU/L-ACNC: 0.76 UIU/ML (ref 0.3–4.2)
WBC # BLD AUTO: 5.9 10E3/UL (ref 4–11)

## 2024-11-08 PROCEDURE — 36415 COLL VENOUS BLD VENIPUNCTURE: CPT

## 2024-11-08 PROCEDURE — 86334 IMMUNOFIX E-PHORESIS SERUM: CPT | Mod: 26 | Performed by: STUDENT IN AN ORGANIZED HEALTH CARE EDUCATION/TRAINING PROGRAM

## 2024-11-08 PROCEDURE — G2211 COMPLEX E/M VISIT ADD ON: HCPCS | Performed by: REGISTERED NURSE

## 2024-11-08 PROCEDURE — 82784 ASSAY IGA/IGD/IGG/IGM EACH: CPT

## 2024-11-08 PROCEDURE — 84165 PROTEIN E-PHORESIS SERUM: CPT | Mod: 26 | Performed by: STUDENT IN AN ORGANIZED HEALTH CARE EDUCATION/TRAINING PROGRAM

## 2024-11-08 PROCEDURE — 85004 AUTOMATED DIFF WBC COUNT: CPT

## 2024-11-08 PROCEDURE — 86334 IMMUNOFIX E-PHORESIS SERUM: CPT | Performed by: STUDENT IN AN ORGANIZED HEALTH CARE EDUCATION/TRAINING PROGRAM

## 2024-11-08 PROCEDURE — 82310 ASSAY OF CALCIUM: CPT

## 2024-11-08 PROCEDURE — 83521 IG LIGHT CHAINS FREE EACH: CPT

## 2024-11-08 PROCEDURE — 96401 CHEMO ANTI-NEOPL SQ/IM: CPT

## 2024-11-08 PROCEDURE — 99214 OFFICE O/P EST MOD 30 MIN: CPT | Performed by: REGISTERED NURSE

## 2024-11-08 PROCEDURE — 84165 PROTEIN E-PHORESIS SERUM: CPT | Mod: TC | Performed by: STUDENT IN AN ORGANIZED HEALTH CARE EDUCATION/TRAINING PROGRAM

## 2024-11-08 PROCEDURE — 84155 ASSAY OF PROTEIN SERUM: CPT

## 2024-11-08 PROCEDURE — 99213 OFFICE O/P EST LOW 20 MIN: CPT | Performed by: REGISTERED NURSE

## 2024-11-08 PROCEDURE — 84443 ASSAY THYROID STIM HORMONE: CPT

## 2024-11-08 PROCEDURE — 250N000011 HC RX IP 250 OP 636: Mod: JZ | Performed by: REGISTERED NURSE

## 2024-11-08 RX ORDER — EPINEPHRINE 1 MG/ML
0.3 INJECTION, SOLUTION INTRAMUSCULAR; SUBCUTANEOUS EVERY 5 MIN PRN
Status: CANCELLED | OUTPATIENT
Start: 2024-11-08

## 2024-11-08 RX ORDER — DIPHENHYDRAMINE HCL 25 MG
50 CAPSULE ORAL ONCE
Status: CANCELLED
Start: 2024-11-08 | End: 2024-11-08

## 2024-11-08 RX ORDER — MULTIVITAMIN WITH IRON
1 TABLET ORAL DAILY
Qty: 90 TABLET | Refills: 3 | Status: SHIPPED | OUTPATIENT
Start: 2024-11-08

## 2024-11-08 RX ORDER — ACETAMINOPHEN 325 MG/1
650 TABLET ORAL ONCE
Status: CANCELLED
Start: 2024-11-08 | End: 2024-11-08

## 2024-11-08 RX ORDER — DEXAMETHASONE 4 MG/1
12 TABLET ORAL ONCE
Status: CANCELLED
Start: 2024-11-08 | End: 2024-11-08

## 2024-11-08 RX ORDER — DIPHENHYDRAMINE HYDROCHLORIDE 50 MG/ML
50 INJECTION INTRAMUSCULAR; INTRAVENOUS
Status: CANCELLED
Start: 2024-11-08

## 2024-11-08 RX ORDER — ALBUTEROL SULFATE 90 UG/1
1-2 INHALANT RESPIRATORY (INHALATION)
Status: CANCELLED
Start: 2024-11-08

## 2024-11-08 RX ORDER — METHYLPREDNISOLONE SODIUM SUCCINATE 40 MG/ML
40 INJECTION INTRAMUSCULAR; INTRAVENOUS
Status: CANCELLED
Start: 2024-11-08

## 2024-11-08 RX ORDER — HEPARIN SODIUM (PORCINE) LOCK FLUSH IV SOLN 100 UNIT/ML 100 UNIT/ML
5 SOLUTION INTRAVENOUS
Status: CANCELLED | OUTPATIENT
Start: 2024-11-08

## 2024-11-08 RX ORDER — MEPERIDINE HYDROCHLORIDE 25 MG/ML
25 INJECTION INTRAMUSCULAR; INTRAVENOUS; SUBCUTANEOUS
Status: CANCELLED | OUTPATIENT
Start: 2024-11-08

## 2024-11-08 RX ORDER — ALBUTEROL SULFATE 0.83 MG/ML
2.5 SOLUTION RESPIRATORY (INHALATION)
Status: CANCELLED | OUTPATIENT
Start: 2024-11-08

## 2024-11-08 RX ORDER — DIPHENHYDRAMINE HYDROCHLORIDE 50 MG/ML
25 INJECTION INTRAMUSCULAR; INTRAVENOUS
Status: CANCELLED
Start: 2024-11-08

## 2024-11-08 RX ORDER — HEPARIN SODIUM,PORCINE 10 UNIT/ML
5 VIAL (ML) INTRAVENOUS
Status: CANCELLED | OUTPATIENT
Start: 2024-11-08

## 2024-11-08 RX ADMIN — DARATUMUMAB AND HYALURONIDASE-FIHJ (HUMAN RECOMBINANT) 1800 MG: 1800; 30000 INJECTION SUBCUTANEOUS at 12:01

## 2024-11-08 ASSESSMENT — PAIN SCALES - GENERAL: PAINLEVEL_OUTOF10: NO PAIN (0)

## 2024-11-08 NOTE — LETTER
11/8/2024      June Ronquillo  [address]  [facility] [address]      Dear Colleague,    Thank you for referring your patient, June Ronquillo, to the [facility]. Please see a copy of my visit note below.        [facility]  Date of visit: 11/08/2024        Oncologic History  - 4/30/2021 M spike of 34.8 in urine.M spike in blood 0.2; IgG 702 with depressed IgA and IgM. Beta 2 microglobulin 2.7. Lambda  with K/L ratio of 0.01. WBC 11.1 with absolute eos of 1.0. hgb, plt, Cr (1.1), and albumin WNL.    -4/30/2021 CT abd/pelvis showed sclerotic marrow changes with numerous lytic appearing lesions throughout the imaged portions of the spine, pelvis and ribs.      -PET scan 5/11/2021 Numerous osteolytic lesions which predominantly demonstrate uptake below liver background levels. There is one intraosseous lesion in the left lateral 9th rib that demonstrates uptake above background, possibly due to nondisplaced fracture. Adjacent to this lesion is mild hypermetabolism to the left pleura, with new small left pleural effusion, suspicious for malignant effusion versus posttraumatic effusion. Pleural uptake may represent extramedullary myeloma extending along the intercostal space versus inflammation.    - BM bx 5/17/2021 with flow showing 4.0% plasma cells which express CD19 (dim), CD38 (bright), CD45 (dim), , and monotypic cytoplasmic lambda immunoglobulin light chains but lack CD20 and CD56.  Hypercellular bone marrow for age (approximately 75% cellularity) with adequate trilineage hematopoiesis. Plasma cell infiltrate: Interstitial, lambda monotypic and representing approximately 60% the bone marrow cellularity, by  immunohistochemical stain. Cytpgenetics with 2 related hypodiploid clones. One with loss of Y, monosomy 13 and 14 (5%) and one with translocation of 8p and 14, derivative 16 with long arm replaced by extra copy  1q,monosomy 21. FISH showed gain of 1q, loss of 13 and 14, IGH-MYC fusion t(8:14)    - Started Miracle-RVd 21 day with velcade on days 1,8,15.     -Cycle 2 Miracle-RVd. Dose reduce dex to 80 mg d/t fatigue and stomach upset on dex days. Also having cough with basilar streaking on CXR    - 8/31/2021 BM bx -rare suspicious plasma cells in touch imprint. No increase in plasma cells by morph.    -8/31/2021 PET/CT Diffuse osteolytic lesions throughout the axial and appendicular skeleton. Increased sclerosis since prior exam 5/11/2021 without increased metabolism or size.  Hypermetabolic pretracheal lymph node as well as hypermetabolic level 2 lymph nodes within the right neck. These lymph nodes are likely reactive from autoimmune activation via medical therapy. Hypermetabolic subcentimeter nodules within the thyroid gland    - 11/11/2021 Autologous BMT    - 2/21/22 started maintenance with Revlimid 10 mg and daratumumab monthly; did Revlimid alone for C1 due to low IgG levels, added daratumumab starting C2.  Initially did Revlimid daily, decreased to 21/28 days in Nov 2023 to help minimize side effects    Interval Hx:   Logan presents to clinic today for toxicity check prior to C37D1.  No recent respiratory infections, feels well overall  No new pain  No other issues or concerns to report     ROS: 10 point ROS neg other than the symptoms noted above in the HPI.    Physical Exam:  /86   Pulse 77   Temp 97.9  F (36.6  C) (Oral)   Resp 18   Wt 72.5 kg (159 lb 12.8 oz)   SpO2 100%   BMI 25.79 kg/m      Gen: alert, pleasant and conversational, NAD  HEENT: NC/AT,EOMI w/ PERRL, anicteric sclera. OP clear. MMM.   Neck: Supple, no LAD  CV: normal S1,S2 with RRR no m/r/g  Resp: lungs CTA bilaterally with adequate air movement to bases. No wheezes or crackles  Abd: soft NTND no organomegaly or masses. BS normoactive.   Ext: warm and well perfused, no edema or cyanosis  Lymphatics: no palpable cervical, axillary, or inguinal  "LAD. No HSM  Skin: no concerning lesions or rashes  Neuro: A&Ox4, no lateralizing sx. Grossly nonfocal.  Psych: appropriate, reactive        Allergies   Allergen Reactions     Blood Transfusion Related (Informational Only) Other (See Comments)     Patient has a history of a clinically significant antibody against RBC antigens.  A delay in compatible RBCs may occur.      Current Outpatient Medications   Medication Sig Dispense Refill     vitamin C with B complex (B COMPLEX-C) tablet Take 1 tablet by mouth daily. 90 tablet 3     acyclovir (ZOVIRAX) 400 MG tablet Take 400 mg by mouth 2 times daily. 60 tablet 3     albuterol (PROAIR HFA/PROVENTIL HFA/VENTOLIN HFA) 108 (90 Base) MCG/ACT inhaler Inhale 2 puffs into the lungs 4 times daily 18 g 0     aspirin (ASA) 81 MG EC tablet Take 1 tablet (81 mg) by mouth daily       calcium carbonate-vitamin D (OSCAL) 500-5 MG-MCG tablet TAKE ONE TABLET BY MOUTH ONCE DAILY 90 tablet 3     LENalidomide (REVLIMID) 10 MG CAPS capsule Take 1 capsule (10 mg) by mouth daily 21 capsule 0     LENalidomide (REVLIMID) 10 MG CAPS capsule Take 1 capsule (10 mg) by mouth daily 21 capsule 0     levothyroxine (SYNTHROID/LEVOTHROID) 112 MCG tablet Take 1 tablet (112 mcg) by mouth daily 90 tablet 1     Needle, Disp, (B-D BLUNT FILL NEEDLE) 18G X 1-1/2\" MISC For use with testosterone injection: Disposable syringe and needles, use one 18 G needle to draw up medication then switch to 23 G injection needle 25 each 0     Needle, Disp, (BD DISP NEEDLE) 23G X 1\" MISC For use with testosterone injection: Disposable syringe and needles, use one 18 G needle to draw up medication then switch to 23 G injection needle 25 each 0     potassium chloride carson ER (KLOR-CON M10) 10 MEQ CR tablet Take 2 tablets (20 mEq) by mouth daily. 60 tablet 3     rosuvastatin (CRESTOR) 40 MG tablet Take 1 tablet (40 mg) by mouth daily. 90 tablet 4     syringe, disposable, 1 ML MISC For use with testosterone injection: Disposable " syringe and 2 needles, use one 18 G needle to draw up medication then switch to 23 G injection needle 25 each 0     testosterone cypionate (DEPOTESTOSTERONE) 200 MG/ML injection Inject 0.75 mLs (150 mg) into the muscle every 14 days 10 mL 0     No current facility-administered medications for this visit.     Most Recent 3 CBC's:  Recent Labs   Lab Test 11/08/24  1053 10/09/24  1550 09/13/24  1043   WBC 5.9 6.5 4.8   HGB 12.0* 11.0* 11.7*   MCV 81 82 82    436 419   ANEUTAUTO 3.2 3.5 2.4     Most Recent 3 BMP's:  Recent Labs   Lab Test 11/08/24  1053 10/09/24  1550 09/13/24  1043    137 139   POTASSIUM 4.2 3.7 3.9   CHLORIDE 105 104 107   CO2 21* 23 20*   BUN 14.8 13.7 18.2   CR 1.17 1.35* 1.18*   ANIONGAP 10 10 12   HARDEEP 9.1 8.6* 9.0   GLC 98 104* 138*   PROTTOTAL 6.6 6.1* 6.1*   ALBUMIN 4.2 4.0 4.3    Most Recent 3 LFT's:  Recent Labs   Lab Test 11/08/24  1053 10/09/24  1550 09/13/24  1043   AST 26 32 32   ALT 13 23 17   ALKPHOS 70 65 60   BILITOTAL 0.3 0.2 0.4    Most Recent 2 TSH and T4:  Recent Labs   Lab Test 07/19/24  1001 01/08/24  0713 12/19/22  1103 06/17/22  1332   TSH 0.05* 0.86   < > 0.07*   T4 1.20  --   --  1.36    < > = values in this interval not displayed.         Assessment and Plan  Multiple myeloma with high risk cytogenetics  - Miracle-RVD with VGPR, then underwent autologous transplant 11/11/21.   - Given his high risk cytogenetics he started miracle + revlimid maintenance therapy 2/21/22---ok to proceed with C37D1 today 10/9/24 as long as his home COVID test is negative  - Completed 2 years of zometa in Sept 2023. Continue Vit D.   - Revlimid was decreased to 21/28 days in Nov 2023 to try to minimize side effects (dysgeusia, flu-like symptoms on days following daratumumab)  - He is currently tolerating treatment well without side-effects.     Ppx/ID  - Continue acyclovir daily for viral ppx   - COVID + 10/19/23.  Received COVID booster 02/02/24   - Pneumococcal vaccine given 02/02/24   -  Got both Shingrix vaccines 3/4/24 and 5/7/2024  - Hep A vaccine given 5/14/24--to be repeated in 12 weeks  - MMR vaccine 5/14/24  - Received influenza and covid vaccines for 2024    Hypogammaglobulinemia   Start monthly IV Ig if IgG <300  - 9/13/24 IgG 511  - If continues to have repeat URI could consider raising parameters for IVIG    Normocytic Anemia:   - Likely d/t revlimid and daratumumab.   - Will keep monitoring and dose reduce if needed.    - Hgb stable at this time      20 minutes spent on the date of the encounter doing chart review, review of test results, interpretation of tests, patient visit, and documentation     The longitudinal plan of care for the diagnosis(es)/condition(s) as documented were addressed during this visit. Due to the added complexity in care, I will continue to support June in the subsequent management and with ongoing continuity of care.    PATTY Meza CNP  [facility] [place] [address] [place], [place] [address] [phone]      Again, thank you for allowing me to participate in the care of your patient.        Sincerely,        PATTY Landaverde CNP

## 2024-11-08 NOTE — NURSING NOTE
"Oncology Rooming Note    November 8, 2024 11:07 AM   June Ronquillo is a 54 year old male who presents for:    Chief Complaint   Patient presents with    Blood Draw     Labs drawn via VPT by lab RN    Oncology Clinic Visit     Multiple myeloma     Initial Vitals: /86   Pulse 77   Temp 97.9  F (36.6  C) (Oral)   Resp 18   Wt 72.5 kg (159 lb 12.8 oz)   SpO2 100%   BMI 25.79 kg/m   Estimated body mass index is 25.79 kg/m  as calculated from the following:    Height as of 10/9/24: 1.676 m (5' 6\").    Weight as of this encounter: 72.5 kg (159 lb 12.8 oz). Body surface area is 1.84 meters squared.  No Pain (0) Comment: Data Unavailable   No LMP for male patient.  Allergies reviewed: Yes  Medications reviewed: Yes    Medications: MEDICATION REFILLS NEEDED TODAY. Provider was notified.  Pharmacy name entered into [employer]:    [facility] [place], [place] - [address]  [facility] - [place], [place] - [address] [facility] [place], [place] [phone]  [facility] [place], [place] - [address] [facility]  [facility] - [place], [place] - [address]    Frailty Screening:   Is the patient here for a new oncology consult visit in cancer care? 2. No      Clinical concerns: Needs refills on his vitamin B Complex with C, reports that this order needs to go to [facility].    Emanuel Da Silva LPN              "

## 2024-11-08 NOTE — PROGRESS NOTES
Infusion Nursing Note:  June Ronquillo presents today for Cycle 37 Day 1 Darzalex Faspro.    Patient seen by provider today: Yes: Luara Ballesteros CNP   present during visit today: Not Applicable.    Note: N/A.      Intravenous Access:  Venipuncture.    Treatment Conditions:  Lab Results   Component Value Date    HGB 12.0 (L) 11/08/2024    WBC 5.9 11/08/2024    ANEU 4.7 03/13/2023    ANEUTAUTO 3.2 11/08/2024     11/08/2024        Lab Results   Component Value Date     11/08/2024    POTASSIUM 4.2 11/08/2024    MAG 2.6 (H) 04/28/2023    CR 1.17 11/08/2024    HARDEEP 9.1 11/08/2024    BILITOTAL 0.3 11/08/2024    ALBUMIN 4.2 11/08/2024    ALT 13 11/08/2024    AST 26 11/08/2024       Results reviewed, labs MET treatment parameters, ok to proceed with treatment.      Post Infusion Assessment:  Patient tolerated Darzalex Faspro injection into LEFT lower abdomen without incident.       Discharge Plan:   Patient declined prescription refills.  Discharge instructions reviewed with: Patient.  Patient and/or family verbalized understanding of discharge instructions and all questions answered.  AVS to patient via [employer].  Patient will return 12/6 for next appointment.   Patient discharged in stable condition accompanied by: self.  Departure Mode: Ambulatory.      Mary Chin RN

## 2024-11-08 NOTE — PROGRESS NOTES
[facility]  Date of visit: 11/08/2024        Oncologic History  - 4/30/2021 M spike of 34.8 in urine.M spike in blood 0.2; IgG 702 with depressed IgA and IgM. Beta 2 microglobulin 2.7. Lambda  with K/L ratio of 0.01. WBC 11.1 with absolute eos of 1.0. hgb, plt, Cr (1.1), and albumin WNL.    -4/30/2021 CT abd/pelvis showed sclerotic marrow changes with numerous lytic appearing lesions throughout the imaged portions of the spine, pelvis and ribs.      -PET scan 5/11/2021 Numerous osteolytic lesions which predominantly demonstrate uptake below liver background levels. There is one intraosseous lesion in the left lateral 9th rib that demonstrates uptake above background, possibly due to nondisplaced fracture. Adjacent to this lesion is mild hypermetabolism to the left pleura, with new small left pleural effusion, suspicious for malignant effusion versus posttraumatic effusion. Pleural uptake may represent extramedullary myeloma extending along the intercostal space versus inflammation.    - BM bx 5/17/2021 with flow showing 4.0% plasma cells which express CD19 (dim), CD38 (bright), CD45 (dim), , and monotypic cytoplasmic lambda immunoglobulin light chains but lack CD20 and CD56.  Hypercellular bone marrow for age (approximately 75% cellularity) with adequate trilineage hematopoiesis. Plasma cell infiltrate: Interstitial, lambda monotypic and representing approximately 60% the bone marrow cellularity, by  immunohistochemical stain. Cytpgenetics with 2 related hypodiploid clones. One with loss of Y, monosomy 13 and 14 (5%) and one with translocation of 8p and 14, derivative 16 with long arm replaced by extra copy 1q,monosomy 21. FISH showed gain of 1q, loss of 13 and 14, IGH-MYC fusion t(8:14)    - Started Miracle-RVd 21 day with velcade on days 1,8,15.     -Cycle 2 Miracle-RVd. Dose reduce dex to 80 mg d/t fatigue and stomach upset on dex days. Also having cough with  basilar streaking on CXR    - 8/31/2021 BM bx -rare suspicious plasma cells in touch imprint. No increase in plasma cells by morph.    -8/31/2021 PET/CT Diffuse osteolytic lesions throughout the axial and appendicular skeleton. Increased sclerosis since prior exam 5/11/2021 without increased metabolism or size.  Hypermetabolic pretracheal lymph node as well as hypermetabolic level 2 lymph nodes within the right neck. These lymph nodes are likely reactive from autoimmune activation via medical therapy. Hypermetabolic subcentimeter nodules within the thyroid gland    - 11/11/2021 Autologous BMT    - 2/21/22 started maintenance with Revlimid 10 mg and daratumumab monthly; did Revlimid alone for C1 due to low IgG levels, added daratumumab starting C2.  Initially did Revlimid daily, decreased to 21/28 days in Nov 2023 to help minimize side effects    Interval Hx:   Logan presents to clinic today for toxicity check prior to C37D1.  No recent respiratory infections, feels well overall  No new pain  No other issues or concerns to report     ROS: 10 point ROS neg other than the symptoms noted above in the HPI.    Physical Exam:  /86   Pulse 77   Temp 97.9  F (36.6  C) (Oral)   Resp 18   Wt 72.5 kg (159 lb 12.8 oz)   SpO2 100%   BMI 25.79 kg/m      Gen: alert, pleasant and conversational, NAD  HEENT: NC/AT,EOMI w/ PERRL, anicteric sclera. OP clear. MMM.   Neck: Supple, no LAD  CV: normal S1,S2 with RRR no m/r/g  Resp: lungs CTA bilaterally with adequate air movement to bases. No wheezes or crackles  Abd: soft NTND no organomegaly or masses. BS normoactive.   Ext: warm and well perfused, no edema or cyanosis  Lymphatics: no palpable cervical, axillary, or inguinal LAD. No HSM  Skin: no concerning lesions or rashes  Neuro: A&Ox4, no lateralizing sx. Grossly nonfocal.  Psych: appropriate, reactive        Allergies   Allergen Reactions    Blood Transfusion Related (Informational Only) Other (See Comments)     Patient  "has a history of a clinically significant antibody against RBC antigens.  A delay in compatible RBCs may occur.      Current Outpatient Medications   Medication Sig Dispense Refill    vitamin C with B complex (B COMPLEX-C) tablet Take 1 tablet by mouth daily. 90 tablet 3    acyclovir (ZOVIRAX) 400 MG tablet Take 400 mg by mouth 2 times daily. 60 tablet 3    albuterol (PROAIR HFA/PROVENTIL HFA/VENTOLIN HFA) 108 (90 Base) MCG/ACT inhaler Inhale 2 puffs into the lungs 4 times daily 18 g 0    aspirin (ASA) 81 MG EC tablet Take 1 tablet (81 mg) by mouth daily      calcium carbonate-vitamin D (OSCAL) 500-5 MG-MCG tablet TAKE ONE TABLET BY MOUTH ONCE DAILY 90 tablet 3    LENalidomide (REVLIMID) 10 MG CAPS capsule Take 1 capsule (10 mg) by mouth daily 21 capsule 0    LENalidomide (REVLIMID) 10 MG CAPS capsule Take 1 capsule (10 mg) by mouth daily 21 capsule 0    levothyroxine (SYNTHROID/LEVOTHROID) 112 MCG tablet Take 1 tablet (112 mcg) by mouth daily 90 tablet 1    Needle, Disp, (B-D BLUNT FILL NEEDLE) 18G X 1-1/2\" MISC For use with testosterone injection: Disposable syringe and needles, use one 18 G needle to draw up medication then switch to 23 G injection needle 25 each 0    Needle, Disp, (BD DISP NEEDLE) 23G X 1\" MISC For use with testosterone injection: Disposable syringe and needles, use one 18 G needle to draw up medication then switch to 23 G injection needle 25 each 0    potassium chloride carson ER (KLOR-CON M10) 10 MEQ CR tablet Take 2 tablets (20 mEq) by mouth daily. 60 tablet 3    rosuvastatin (CRESTOR) 40 MG tablet Take 1 tablet (40 mg) by mouth daily. 90 tablet 4    syringe, disposable, 1 ML MISC For use with testosterone injection: Disposable syringe and 2 needles, use one 18 G needle to draw up medication then switch to 23 G injection needle 25 each 0    testosterone cypionate (DEPOTESTOSTERONE) 200 MG/ML injection Inject 0.75 mLs (150 mg) into the muscle every 14 days 10 mL 0     No current " facility-administered medications for this visit.     Most Recent 3 CBC's:  Recent Labs   Lab Test 11/08/24  1053 10/09/24  1550 09/13/24  1043   WBC 5.9 6.5 4.8   HGB 12.0* 11.0* 11.7*   MCV 81 82 82    436 419   ANEUTAUTO 3.2 3.5 2.4     Most Recent 3 BMP's:  Recent Labs   Lab Test 11/08/24  1053 10/09/24  1550 09/13/24  1043    137 139   POTASSIUM 4.2 3.7 3.9   CHLORIDE 105 104 107   CO2 21* 23 20*   BUN 14.8 13.7 18.2   CR 1.17 1.35* 1.18*   ANIONGAP 10 10 12   HARDEEP 9.1 8.6* 9.0   GLC 98 104* 138*   PROTTOTAL 6.6 6.1* 6.1*   ALBUMIN 4.2 4.0 4.3    Most Recent 3 LFT's:  Recent Labs   Lab Test 11/08/24  1053 10/09/24  1550 09/13/24  1043   AST 26 32 32   ALT 13 23 17   ALKPHOS 70 65 60   BILITOTAL 0.3 0.2 0.4    Most Recent 2 TSH and T4:  Recent Labs   Lab Test 07/19/24  1001 01/08/24  0713 12/19/22  1103 06/17/22  1332   TSH 0.05* 0.86   < > 0.07*   T4 1.20  --   --  1.36    < > = values in this interval not displayed.         Assessment and Plan  Multiple myeloma with high risk cytogenetics  - Miracle-RVD with VGPR, then underwent autologous transplant 11/11/21.   - Given his high risk cytogenetics he started miracle + revlimid maintenance therapy 2/21/22---ok to proceed with C37D1 today 10/9/24 as long as his home COVID test is negative  - Completed 2 years of zometa in Sept 2023. Continue Vit D.   - Revlimid was decreased to 21/28 days in Nov 2023 to try to minimize side effects (dysgeusia, flu-like symptoms on days following daratumumab)  - He is currently tolerating treatment well without side-effects.     Ppx/ID  - Continue acyclovir daily for viral ppx   - COVID + 10/19/23.  Received COVID booster 02/02/24   - Pneumococcal vaccine given 02/02/24   - Got both Shingrix vaccines 3/4/24 and 5/7/2024  - Hep A vaccine given 5/14/24--to be repeated in 12 weeks  - MMR vaccine 5/14/24  - Received influenza and covid vaccines for 2024    Hypogammaglobulinemia   Start monthly IV Ig if IgG <300  - 9/13/24 IgG  511  - If continues to have repeat URI could consider raising parameters for IVIG    Normocytic Anemia:   - Likely d/t revlimid and daratumumab.   - Will keep monitoring and dose reduce if needed.    - Hgb stable at this time      20 minutes spent on the date of the encounter doing chart review, review of test results, interpretation of tests, patient visit, and documentation     The longitudinal plan of care for the diagnosis(es)/condition(s) as documented were addressed during this visit. Due to the added complexity in care, I will continue to support Jitendrapal in the subsequent management and with ongoing continuity of care.    PATTY Meza [facility]  [address] [place] [id number] [phone]

## 2024-11-08 NOTE — NURSING NOTE
Chief Complaint   Patient presents with    Blood Draw     Labs drawn via VPT by lab RN     Venipuncture labs drawn by lab RN, vitals taken and appointment arrived.    Clarita Martinez RN

## 2024-11-10 DIAGNOSIS — R79.89 LOW TESTOSTERONE IN MALE: ICD-10-CM

## 2024-11-11 LAB
ALBUMIN SERPL ELPH-MCNC: 4 G/DL (ref 3.7–5.1)
ALPHA1 GLOB SERPL ELPH-MCNC: 0.3 G/DL (ref 0.2–0.4)
ALPHA2 GLOB SERPL ELPH-MCNC: 0.6 G/DL (ref 0.5–0.9)
B-GLOBULIN SERPL ELPH-MCNC: 0.8 G/DL (ref 0.6–1)
GAMMA GLOB SERPL ELPH-MCNC: 0.6 G/DL (ref 0.7–1.6)
IGA SERPL-MCNC: 64 MG/DL (ref 84–499)
IGG SERPL-MCNC: 558 MG/DL (ref 610–1616)
IGM SERPL-MCNC: 45 MG/DL (ref 35–242)
KAPPA LC FREE SER-MCNC: 0.74 MG/DL (ref 0.33–1.94)
KAPPA LC FREE/LAMBDA FREE SER NEPH: 1.06 {RATIO} (ref 0.26–1.65)
LAMBDA LC FREE SERPL-MCNC: 0.7 MG/DL (ref 0.57–2.63)
LOCATION OF TASK: ABNORMAL
LOCATION OF TASK: NORMAL
M PROTEIN SERPL ELPH-MCNC: 0.1 G/DL
PROT PATTERN SERPL ELPH-IMP: ABNORMAL
PROT PATTERN SERPL IFE-IMP: NORMAL

## 2024-11-11 RX ORDER — TESTOSTERONE CYPIONATE 200 MG/ML
150 INJECTION, SOLUTION INTRAMUSCULAR
Qty: 10 ML | Refills: 0 | OUTPATIENT
Start: 2024-11-11

## 2024-11-11 NOTE — TELEPHONE ENCOUNTER
Requested Prescriptions   Pending Prescriptions Disp Refills    testosterone cypionate (DEPOTESTOSTERONE) 200 MG/ML injection 10 mL 0     Sig: Inject 0.75 mLs (150 mg) into the muscle every 14 days.       Androgen Agents Failed - 11/11/2024 10:23 AM        Failed - Serum PSA on file within past 12 mos     Lab Results   Component Value Date    PSA 1.26 01/08/2024    PSA 0.45 04/30/2021             Failed - Refills for this classification require provider review        Passed - Patient is of age 12 and older        Passed - Lipid panel on file in past 12 mos     Recent Labs   Lab Test 09/04/24  0815   CHOL 118   TRIG 118   HDL 44   LDL 50   NHDL 74               Passed - ALT on file within past 12 mos     Recent Labs   Lab Test 11/08/24  1053   ALT 13             Passed - Medication is active on med list        Passed - HCT less than 54% on file within past 12 mos     Recent Labs   Lab Test 11/08/24  1053   HCT 37.1*             Passed - Serum Testosterone on file within past 12 mos     Recent Labs   Lab Test 01/08/24  0713   TESTOSTTOTAL 623             Passed - Most recent blood pressure under 140/90 in past 6 months     BP Readings from Last 3 Encounters:   11/08/24 124/86   10/09/24 124/69   10/01/24 (!) 145/67       No data recorded            Passed - Patient is not pregnant        Passed - No positive pregnancy test on file within past 12 mos        Passed - Recent (6 mo) or future (30 days) visit within the authorizing provider's specialty        Passed - AST on file within past 12 mos     Recent Labs   Lab Test 11/08/24  1053   AST 26

## 2024-11-25 ENCOUNTER — LAB (OUTPATIENT)
Dept: LAB | Facility: CLINIC | Age: 54
End: 2024-11-25
Payer: COMMERCIAL

## 2024-11-25 DIAGNOSIS — R73.9 HYPERGLYCEMIA: ICD-10-CM

## 2024-11-25 DIAGNOSIS — R79.89 LOW TESTOSTERONE IN MALE: ICD-10-CM

## 2024-11-25 DIAGNOSIS — E55.9 VITAMIN D INSUFFICIENCY: ICD-10-CM

## 2024-11-25 LAB
ANION GAP SERPL CALCULATED.3IONS-SCNC: 12 MMOL/L (ref 7–15)
BUN SERPL-MCNC: 10.1 MG/DL (ref 6–20)
CALCIUM SERPL-MCNC: 8.5 MG/DL (ref 8.8–10.4)
CHLORIDE SERPL-SCNC: 104 MMOL/L (ref 98–107)
CREAT SERPL-MCNC: 1.24 MG/DL (ref 0.67–1.17)
EGFRCR SERPLBLD CKD-EPI 2021: 68 ML/MIN/1.73M2
EST. AVERAGE GLUCOSE BLD GHB EST-MCNC: 120 MG/DL
GLUCOSE SERPL-MCNC: 111 MG/DL (ref 70–99)
HBA1C MFR BLD: 5.8 % (ref 0–5.6)
HCO3 SERPL-SCNC: 25 MMOL/L (ref 22–29)
POTASSIUM SERPL-SCNC: 3.8 MMOL/L (ref 3.4–5.3)
PSA SERPL DL<=0.01 NG/ML-MCNC: 0.94 NG/ML (ref 0–3.5)
SODIUM SERPL-SCNC: 141 MMOL/L (ref 135–145)
VIT D+METAB SERPL-MCNC: 22 NG/ML (ref 20–50)

## 2024-11-25 PROCEDURE — 82306 VITAMIN D 25 HYDROXY: CPT

## 2024-11-25 PROCEDURE — 36415 COLL VENOUS BLD VENIPUNCTURE: CPT

## 2024-11-25 PROCEDURE — 84403 ASSAY OF TOTAL TESTOSTERONE: CPT

## 2024-11-25 PROCEDURE — 83036 HEMOGLOBIN GLYCOSYLATED A1C: CPT

## 2024-11-25 PROCEDURE — 80048 BASIC METABOLIC PNL TOTAL CA: CPT

## 2024-11-25 PROCEDURE — 84153 ASSAY OF PSA TOTAL: CPT

## 2024-11-26 ENCOUNTER — ONCOLOGY VISIT (OUTPATIENT)
Dept: ONCOLOGY | Facility: CLINIC | Age: 54
End: 2024-11-26
Attending: STUDENT IN AN ORGANIZED HEALTH CARE EDUCATION/TRAINING PROGRAM
Payer: COMMERCIAL

## 2024-11-26 ENCOUNTER — APPOINTMENT (OUTPATIENT)
Dept: LAB | Facility: CLINIC | Age: 54
End: 2024-11-26
Attending: STUDENT IN AN ORGANIZED HEALTH CARE EDUCATION/TRAINING PROGRAM
Payer: COMMERCIAL

## 2024-11-26 VITALS
HEART RATE: 81 BPM | BODY MASS INDEX: 25.82 KG/M2 | DIASTOLIC BLOOD PRESSURE: 85 MMHG | SYSTOLIC BLOOD PRESSURE: 143 MMHG | OXYGEN SATURATION: 98 % | WEIGHT: 160 LBS | RESPIRATION RATE: 16 BRPM | TEMPERATURE: 97.9 F

## 2024-11-26 DIAGNOSIS — D64.9 ANEMIA, UNSPECIFIED TYPE: ICD-10-CM

## 2024-11-26 DIAGNOSIS — C90.01 MULTIPLE MYELOMA IN REMISSION (H): Primary | ICD-10-CM

## 2024-11-26 DIAGNOSIS — C90.00 MULTIPLE MYELOMA NOT HAVING ACHIEVED REMISSION (H): ICD-10-CM

## 2024-11-26 LAB
ALBUMIN SERPL BCG-MCNC: 4.1 G/DL (ref 3.5–5.2)
ALP SERPL-CCNC: 68 U/L (ref 40–150)
ALT SERPL W P-5'-P-CCNC: 22 U/L (ref 0–70)
ANION GAP SERPL CALCULATED.3IONS-SCNC: 10 MMOL/L (ref 7–15)
AST SERPL W P-5'-P-CCNC: 37 U/L (ref 0–45)
BASOPHILS # BLD AUTO: 0 10E3/UL (ref 0–0.2)
BASOPHILS NFR BLD AUTO: 0 %
BILIRUB SERPL-MCNC: 0.3 MG/DL
BUN SERPL-MCNC: 14.3 MG/DL (ref 6–20)
CALCIUM SERPL-MCNC: 8.4 MG/DL (ref 8.8–10.4)
CHLORIDE SERPL-SCNC: 103 MMOL/L (ref 98–107)
CREAT SERPL-MCNC: 1.1 MG/DL (ref 0.67–1.17)
EGFRCR SERPLBLD CKD-EPI 2021: 79 ML/MIN/1.73M2
EOSINOPHIL # BLD AUTO: 0.6 10E3/UL (ref 0–0.7)
EOSINOPHIL NFR BLD AUTO: 11 %
ERYTHROCYTE [DISTWIDTH] IN BLOOD BY AUTOMATED COUNT: 19.2 % (ref 10–15)
FERRITIN SERPL-MCNC: 20 NG/ML (ref 31–409)
GLUCOSE SERPL-MCNC: 95 MG/DL (ref 70–99)
HCO3 SERPL-SCNC: 24 MMOL/L (ref 22–29)
HCT VFR BLD AUTO: 35 % (ref 40–53)
HGB BLD-MCNC: 11.4 G/DL (ref 13.3–17.7)
HOLD SPECIMEN: NORMAL
IMM GRANULOCYTES # BLD: 0 10E3/UL
IMM GRANULOCYTES NFR BLD: 0 %
LYMPHOCYTES # BLD AUTO: 1.6 10E3/UL (ref 0.8–5.3)
LYMPHOCYTES NFR BLD AUTO: 29 %
MCH RBC QN AUTO: 26.5 PG (ref 26.5–33)
MCHC RBC AUTO-ENTMCNC: 32.6 G/DL (ref 31.5–36.5)
MCV RBC AUTO: 81 FL (ref 78–100)
MONOCYTES # BLD AUTO: 0.9 10E3/UL (ref 0–1.3)
MONOCYTES NFR BLD AUTO: 16 %
NEUTROPHILS # BLD AUTO: 2.4 10E3/UL (ref 1.6–8.3)
NEUTROPHILS NFR BLD AUTO: 44 %
NRBC # BLD AUTO: 0 10E3/UL
NRBC BLD AUTO-RTO: 0 /100
PLATELET # BLD AUTO: 217 10E3/UL (ref 150–450)
POTASSIUM SERPL-SCNC: 3.5 MMOL/L (ref 3.4–5.3)
PROT SERPL-MCNC: 6.5 G/DL (ref 6.4–8.3)
RBC # BLD AUTO: 4.31 10E6/UL (ref 4.4–5.9)
RETIC HEMOGLOBIN: 31.5 PG (ref 28.2–35.7)
RETICS # AUTO: 0.06 10E6/UL (ref 0.03–0.1)
RETICS/RBC NFR AUTO: 1.3 % (ref 0.5–2)
SODIUM SERPL-SCNC: 137 MMOL/L (ref 135–145)
TOTAL PROTEIN SERUM FOR ELP: 6.2 G/DL (ref 6.4–8.3)
WBC # BLD AUTO: 5.5 10E3/UL (ref 4–11)

## 2024-11-26 PROCEDURE — 83521 IG LIGHT CHAINS FREE EACH: CPT | Performed by: STUDENT IN AN ORGANIZED HEALTH CARE EDUCATION/TRAINING PROGRAM

## 2024-11-26 PROCEDURE — 84155 ASSAY OF PROTEIN SERUM: CPT | Performed by: STUDENT IN AN ORGANIZED HEALTH CARE EDUCATION/TRAINING PROGRAM

## 2024-11-26 PROCEDURE — 36415 COLL VENOUS BLD VENIPUNCTURE: CPT | Performed by: STUDENT IN AN ORGANIZED HEALTH CARE EDUCATION/TRAINING PROGRAM

## 2024-11-26 PROCEDURE — 82784 ASSAY IGA/IGD/IGG/IGM EACH: CPT | Performed by: STUDENT IN AN ORGANIZED HEALTH CARE EDUCATION/TRAINING PROGRAM

## 2024-11-26 PROCEDURE — 99213 OFFICE O/P EST LOW 20 MIN: CPT | Performed by: STUDENT IN AN ORGANIZED HEALTH CARE EDUCATION/TRAINING PROGRAM

## 2024-11-26 PROCEDURE — 84165 PROTEIN E-PHORESIS SERUM: CPT | Mod: TC | Performed by: PATHOLOGY

## 2024-11-26 PROCEDURE — 85025 COMPLETE CBC W/AUTO DIFF WBC: CPT | Performed by: STUDENT IN AN ORGANIZED HEALTH CARE EDUCATION/TRAINING PROGRAM

## 2024-11-26 PROCEDURE — 85046 RETICYTE/HGB CONCENTRATE: CPT | Performed by: STUDENT IN AN ORGANIZED HEALTH CARE EDUCATION/TRAINING PROGRAM

## 2024-11-26 PROCEDURE — 86334 IMMUNOFIX E-PHORESIS SERUM: CPT | Performed by: PATHOLOGY

## 2024-11-26 PROCEDURE — 82040 ASSAY OF SERUM ALBUMIN: CPT | Performed by: STUDENT IN AN ORGANIZED HEALTH CARE EDUCATION/TRAINING PROGRAM

## 2024-11-26 PROCEDURE — 80053 COMPREHEN METABOLIC PANEL: CPT | Performed by: STUDENT IN AN ORGANIZED HEALTH CARE EDUCATION/TRAINING PROGRAM

## 2024-11-26 PROCEDURE — 82728 ASSAY OF FERRITIN: CPT | Performed by: STUDENT IN AN ORGANIZED HEALTH CARE EDUCATION/TRAINING PROGRAM

## 2024-11-26 RX ORDER — DIPHENHYDRAMINE HYDROCHLORIDE 50 MG/ML
50 INJECTION INTRAMUSCULAR; INTRAVENOUS
Start: 2025-01-31

## 2024-11-26 RX ORDER — METHYLPREDNISOLONE SODIUM SUCCINATE 40 MG/ML
40 INJECTION INTRAMUSCULAR; INTRAVENOUS
Start: 2024-12-06

## 2024-11-26 RX ORDER — DIPHENHYDRAMINE HYDROCHLORIDE 50 MG/ML
50 INJECTION INTRAMUSCULAR; INTRAVENOUS
Start: 2025-02-28

## 2024-11-26 RX ORDER — METHYLPREDNISOLONE SODIUM SUCCINATE 40 MG/ML
40 INJECTION INTRAMUSCULAR; INTRAVENOUS
Start: 2025-01-31

## 2024-11-26 RX ORDER — DIPHENHYDRAMINE HCL 25 MG
50 CAPSULE ORAL ONCE
Start: 2025-01-03 | End: 2025-01-03

## 2024-11-26 RX ORDER — DIPHENHYDRAMINE HCL 25 MG
50 CAPSULE ORAL ONCE
Start: 2024-12-06 | End: 2024-12-06

## 2024-11-26 RX ORDER — DIPHENHYDRAMINE HCL 25 MG
50 CAPSULE ORAL ONCE
Start: 2025-02-28 | End: 2025-02-28

## 2024-11-26 RX ORDER — DIPHENHYDRAMINE HCL 25 MG
50 CAPSULE ORAL ONCE
Start: 2025-01-31 | End: 2025-01-31

## 2024-11-26 RX ORDER — HEPARIN SODIUM,PORCINE 10 UNIT/ML
5 VIAL (ML) INTRAVENOUS
OUTPATIENT
Start: 2024-12-06

## 2024-11-26 RX ORDER — DEXAMETHASONE 4 MG/1
12 TABLET ORAL ONCE
Start: 2025-02-28 | End: 2025-02-28

## 2024-11-26 RX ORDER — MEPERIDINE HYDROCHLORIDE 25 MG/ML
25 INJECTION INTRAMUSCULAR; INTRAVENOUS; SUBCUTANEOUS
OUTPATIENT
Start: 2025-02-28

## 2024-11-26 RX ORDER — DIPHENHYDRAMINE HYDROCHLORIDE 50 MG/ML
25 INJECTION INTRAMUSCULAR; INTRAVENOUS
Start: 2025-01-31

## 2024-11-26 RX ORDER — DIPHENHYDRAMINE HYDROCHLORIDE 50 MG/ML
25 INJECTION INTRAMUSCULAR; INTRAVENOUS
Start: 2025-01-03

## 2024-11-26 RX ORDER — HEPARIN SODIUM,PORCINE 10 UNIT/ML
5 VIAL (ML) INTRAVENOUS
OUTPATIENT
Start: 2025-01-31

## 2024-11-26 RX ORDER — DIPHENHYDRAMINE HYDROCHLORIDE 50 MG/ML
50 INJECTION INTRAMUSCULAR; INTRAVENOUS
Start: 2025-01-03

## 2024-11-26 RX ORDER — ACETAMINOPHEN 325 MG/1
650 TABLET ORAL ONCE
Start: 2024-12-06 | End: 2024-12-06

## 2024-11-26 RX ORDER — MEPERIDINE HYDROCHLORIDE 25 MG/ML
25 INJECTION INTRAMUSCULAR; INTRAVENOUS; SUBCUTANEOUS
OUTPATIENT
Start: 2025-01-03

## 2024-11-26 RX ORDER — HEPARIN SODIUM (PORCINE) LOCK FLUSH IV SOLN 100 UNIT/ML 100 UNIT/ML
5 SOLUTION INTRAVENOUS
OUTPATIENT
Start: 2025-02-28

## 2024-11-26 RX ORDER — DEXAMETHASONE 4 MG/1
12 TABLET ORAL ONCE
Start: 2025-01-31 | End: 2025-01-31

## 2024-11-26 RX ORDER — EPINEPHRINE 1 MG/ML
0.3 INJECTION, SOLUTION INTRAMUSCULAR; SUBCUTANEOUS EVERY 5 MIN PRN
OUTPATIENT
Start: 2025-01-03

## 2024-11-26 RX ORDER — ALBUTEROL SULFATE 90 UG/1
1-2 INHALANT RESPIRATORY (INHALATION)
Start: 2024-12-06

## 2024-11-26 RX ORDER — ALBUTEROL SULFATE 90 UG/1
1-2 INHALANT RESPIRATORY (INHALATION)
Start: 2025-01-31

## 2024-11-26 RX ORDER — DIPHENHYDRAMINE HYDROCHLORIDE 50 MG/ML
25 INJECTION INTRAMUSCULAR; INTRAVENOUS
Start: 2025-02-28

## 2024-11-26 RX ORDER — ALBUTEROL SULFATE 90 UG/1
1-2 INHALANT RESPIRATORY (INHALATION)
Start: 2025-02-28

## 2024-11-26 RX ORDER — ACETAMINOPHEN 325 MG/1
650 TABLET ORAL ONCE
Start: 2025-02-28 | End: 2025-02-28

## 2024-11-26 RX ORDER — HEPARIN SODIUM,PORCINE 10 UNIT/ML
5 VIAL (ML) INTRAVENOUS
OUTPATIENT
Start: 2025-01-03

## 2024-11-26 RX ORDER — DEXAMETHASONE 4 MG/1
12 TABLET ORAL ONCE
Start: 2025-01-03 | End: 2025-01-03

## 2024-11-26 RX ORDER — DIPHENHYDRAMINE HYDROCHLORIDE 50 MG/ML
25 INJECTION INTRAMUSCULAR; INTRAVENOUS
Start: 2024-12-06

## 2024-11-26 RX ORDER — ALBUTEROL SULFATE 0.83 MG/ML
2.5 SOLUTION RESPIRATORY (INHALATION)
OUTPATIENT
Start: 2025-02-28

## 2024-11-26 RX ORDER — ACETAMINOPHEN 325 MG/1
650 TABLET ORAL ONCE
Start: 2025-01-31 | End: 2025-01-31

## 2024-11-26 RX ORDER — MEPERIDINE HYDROCHLORIDE 25 MG/ML
25 INJECTION INTRAMUSCULAR; INTRAVENOUS; SUBCUTANEOUS
OUTPATIENT
Start: 2025-01-31

## 2024-11-26 RX ORDER — HEPARIN SODIUM (PORCINE) LOCK FLUSH IV SOLN 100 UNIT/ML 100 UNIT/ML
5 SOLUTION INTRAVENOUS
OUTPATIENT
Start: 2025-01-03

## 2024-11-26 RX ORDER — HEPARIN SODIUM,PORCINE 10 UNIT/ML
5 VIAL (ML) INTRAVENOUS
OUTPATIENT
Start: 2025-02-28

## 2024-11-26 RX ORDER — DIPHENHYDRAMINE HYDROCHLORIDE 50 MG/ML
50 INJECTION INTRAMUSCULAR; INTRAVENOUS
Start: 2024-12-06

## 2024-11-26 RX ORDER — ALBUTEROL SULFATE 0.83 MG/ML
2.5 SOLUTION RESPIRATORY (INHALATION)
OUTPATIENT
Start: 2025-01-03

## 2024-11-26 RX ORDER — EPINEPHRINE 1 MG/ML
0.3 INJECTION, SOLUTION INTRAMUSCULAR; SUBCUTANEOUS EVERY 5 MIN PRN
OUTPATIENT
Start: 2025-02-28

## 2024-11-26 RX ORDER — ALBUTEROL SULFATE 0.83 MG/ML
2.5 SOLUTION RESPIRATORY (INHALATION)
OUTPATIENT
Start: 2025-01-31

## 2024-11-26 RX ORDER — HEPARIN SODIUM (PORCINE) LOCK FLUSH IV SOLN 100 UNIT/ML 100 UNIT/ML
5 SOLUTION INTRAVENOUS
OUTPATIENT
Start: 2024-12-06

## 2024-11-26 RX ORDER — ALBUTEROL SULFATE 0.83 MG/ML
2.5 SOLUTION RESPIRATORY (INHALATION)
OUTPATIENT
Start: 2024-12-06

## 2024-11-26 RX ORDER — EPINEPHRINE 1 MG/ML
0.3 INJECTION, SOLUTION INTRAMUSCULAR; SUBCUTANEOUS EVERY 5 MIN PRN
OUTPATIENT
Start: 2024-12-06

## 2024-11-26 RX ORDER — METHYLPREDNISOLONE SODIUM SUCCINATE 40 MG/ML
40 INJECTION INTRAMUSCULAR; INTRAVENOUS
Start: 2025-02-28

## 2024-11-26 RX ORDER — ALBUTEROL SULFATE 90 UG/1
1-2 INHALANT RESPIRATORY (INHALATION)
Start: 2025-01-03

## 2024-11-26 RX ORDER — EPINEPHRINE 1 MG/ML
0.3 INJECTION, SOLUTION INTRAMUSCULAR; SUBCUTANEOUS EVERY 5 MIN PRN
OUTPATIENT
Start: 2025-01-31

## 2024-11-26 RX ORDER — HEPARIN SODIUM (PORCINE) LOCK FLUSH IV SOLN 100 UNIT/ML 100 UNIT/ML
5 SOLUTION INTRAVENOUS
OUTPATIENT
Start: 2025-01-31

## 2024-11-26 RX ORDER — DEXAMETHASONE 4 MG/1
12 TABLET ORAL ONCE
Start: 2024-12-06 | End: 2024-12-06

## 2024-11-26 RX ORDER — METHYLPREDNISOLONE SODIUM SUCCINATE 40 MG/ML
40 INJECTION INTRAMUSCULAR; INTRAVENOUS
Start: 2025-01-03

## 2024-11-26 RX ORDER — ACETAMINOPHEN 325 MG/1
650 TABLET ORAL ONCE
Start: 2025-01-03 | End: 2025-01-03

## 2024-11-26 RX ORDER — MEPERIDINE HYDROCHLORIDE 25 MG/ML
25 INJECTION INTRAMUSCULAR; INTRAVENOUS; SUBCUTANEOUS
OUTPATIENT
Start: 2024-12-06

## 2024-11-26 ASSESSMENT — PAIN SCALES - GENERAL: PAINLEVEL_OUTOF10: NO PAIN (0)

## 2024-11-26 NOTE — PROGRESS NOTES
[facility]  Date of visit: 11/26/2024        Oncologic History  - 4/30/2021 M spike of 34.8 in urine.M spike in blood 0.2; IgG 702 with depressed IgA and IgM. Beta 2 microglobulin 2.7. Lambda  with K/L ratio of 0.01. WBC 11.1 with absolute eos of 1.0. hgb, plt, Cr (1.1), and albumin WNL.    -4/30/2021 CT abd/pelvis showed sclerotic marrow changes with numerous lytic appearing lesions throughout the imaged portions of the spine, pelvis and ribs.      -PET scan 5/11/2021 Numerous osteolytic lesions which predominantly demonstrate uptake below liver background levels. There is one intraosseous lesion in the left lateral 9th rib that demonstrates uptake above background, possibly due to nondisplaced fracture. Adjacent to this lesion is mild hypermetabolism to the left pleura, with new small left pleural effusion, suspicious for malignant effusion versus posttraumatic effusion. Pleural uptake may represent extramedullary myeloma extending along the intercostal space versus inflammation.    - BM bx 5/17/2021 with flow showing 4.0% plasma cells which express CD19 (dim), CD38 (bright), CD45 (dim), , and monotypic cytoplasmic lambda immunoglobulin light chains but lack CD20 and CD56.  Hypercellular bone marrow for age (approximately 75% cellularity) with adequate trilineage hematopoiesis. Plasma cell infiltrate: Interstitial, lambda monotypic and representing approximately 60% the bone marrow cellularity, by  immunohistochemical stain. Cytpgenetics with 2 related hypodiploid clones. One with loss of Y, monosomy 13 and 14 (5%) and one with translocation of 8p and 14, derivative 16 with long arm replaced by extra copy 1q,monosomy 21. FISH showed gain of 1q, loss of 13 and 14, IGH-MYC fusion t(8:14)    - Started Miracle-RVd 21 day with velcade on days 1,8,15.     -Cycle 2 Miracle-RVd. Dose reduce dex to 80 mg d/t fatigue and stomach upset on dex days. Also having cough with  basilar streaking on CXR    - 8/31/2021 BM bx -rare suspicious plasma cells in touch imprint. No increase in plasma cells by morph.    -8/31/2021 PET/CT Diffuse osteolytic lesions throughout the axial and appendicular skeleton. Increased sclerosis since prior exam 5/11/2021 without increased metabolism or size.  Hypermetabolic pretracheal lymph node as well as hypermetabolic level 2 lymph nodes within the right neck. These lymph nodes are likely reactive from autoimmune activation via medical therapy. Hypermetabolic subcentimeter nodules within the thyroid gland    - 11/11/2021 Autologous BMT    - 2/21/22 started maintenance with Revlimid 10 mg and daratumumab monthly; did Revlimid alone for C1 due to low IgG levels, added daratumumab starting C2.  Initially did Revlimid daily, decreased to 21/28 days in Nov 2023 to help minimize side effects    Interval Hx:   Logan recent cold, taking decongestants. Creatinine was up slightlyon prior labs. He thinks it is due to inadequate fluid intake while he is taking decongestants.      ROS: 10 point ROS neg other than the symptoms noted above in the HPI.    Physical Exam:  BP (!) 143/85   Pulse 81   Temp 97.9  F (36.6  C)   Resp 16   Wt 72.6 kg (160 lb)   SpO2 98%   BMI 25.82 kg/m      Constitutional: NAD  Eyes: no scleral icterus  ENT: no oral lesions  Pulm: CTAB  CV: RRR, no m/r/g  GI: bowel sounds present, soft and nontender to palpation  MSK: No edema in the extremities  Neuro: alert, conversant, normal gait  Psych: appropriate mood and affect          Allergies   Allergen Reactions    Blood Transfusion Related (Informational Only) Other (See Comments)     Patient has a history of a clinically significant antibody against RBC antigens.  A delay in compatible RBCs may occur.      Current Outpatient Medications   Medication Sig Dispense Refill    albuterol (PROAIR HFA/PROVENTIL HFA/VENTOLIN HFA) 108 (90 Base) MCG/ACT inhaler Inhale 2 puffs into the lungs 4 times daily  "(Patient taking differently: Inhale 2 puffs into the lungs as needed.) 18 g 0    aspirin (ASA) 81 MG EC tablet Take 1 tablet (81 mg) by mouth daily      calcium carbonate-vitamin D (OSCAL) 500-5 MG-MCG tablet TAKE ONE TABLET BY MOUTH ONCE DAILY 90 tablet 3    [START ON 12/6/2024] LENalidomide (REVLIMID) 10 MG CAPS capsule Take 1 capsule (10 mg) by mouth daily Days 1-21 of each cycle 21 capsule 0    LENalidomide (REVLIMID) 10 MG CAPS capsule Take 1 capsule (10 mg) by mouth daily 21 capsule 0    levothyroxine (SYNTHROID/LEVOTHROID) 112 MCG tablet Take 1 tablet (112 mcg) by mouth daily 90 tablet 1    Needle, Disp, (B-D BLUNT FILL NEEDLE) 18G X 1-1/2\" MISC For use with testosterone injection: Disposable syringe and needles, use one 18 G needle to draw up medication then switch to 23 G injection needle 25 each 0    Needle, Disp, (BD DISP NEEDLE) 23G X 1\" MISC For use with testosterone injection: Disposable syringe and needles, use one 18 G needle to draw up medication then switch to 23 G injection needle 25 each 0    potassium chloride carson ER (KLOR-CON M10) 10 MEQ CR tablet Take 2 tablets (20 mEq) by mouth daily. 60 tablet 3    rosuvastatin (CRESTOR) 40 MG tablet Take 1 tablet (40 mg) by mouth daily. 90 tablet 4    syringe, disposable, 1 ML MISC For use with testosterone injection: Disposable syringe and 2 needles, use one 18 G needle to draw up medication then switch to 23 G injection needle 25 each 0    testosterone cypionate (DEPOTESTOSTERONE) 200 MG/ML injection Inject 0.75 mLs (150 mg) into the muscle every 14 days 10 mL 0    vitamin C with B complex (B COMPLEX-C) tablet Take 1 tablet by mouth daily. 90 tablet 3    acyclovir (ZOVIRAX) 400 MG tablet Take 400 mg by mouth 2 times daily. (Patient not taking: Reported on 11/26/2024) 60 tablet 3     No current facility-administered medications for this visit.     Most Recent 3 CBC's:  Recent Labs   Lab Test 11/08/24  1053 10/09/24  1550 09/13/24  1043   WBC 5.9 6.5 4.8 "   HGB 12.0* 11.0* 11.7*   MCV 81 82 82    436 419   ANEUTAUTO 3.2 3.5 2.4     Most Recent 3 BMP's:  Recent Labs   Lab Test 11/25/24  0803 11/08/24  1053 10/09/24  1550 09/13/24  1043    136 137 139   POTASSIUM 3.8 4.2 3.7 3.9   CHLORIDE 104 105 104 107   CO2 25 21* 23 20*   BUN 10.1 14.8 13.7 18.2   CR 1.24* 1.17 1.35* 1.18*   ANIONGAP 12 10 10 12   HARDEEP 8.5* 9.1 8.6* 9.0   * 98 104* 138*   PROTTOTAL  --  6.6 6.1* 6.1*   ALBUMIN  --  4.2 4.0 4.3    Most Recent 3 LFT's:  Recent Labs   Lab Test 11/08/24  1053 10/09/24  1550 09/13/24  1043   AST 26 32 32   ALT 13 23 17   ALKPHOS 70 65 60   BILITOTAL 0.3 0.2 0.4    Most Recent 2 TSH and T4:  Recent Labs   Lab Test 11/08/24  1053 07/19/24  1001 12/19/22  1103 06/17/22  1332   TSH 0.76 0.05*   < > 0.07*   T4  --  1.20  --  1.36    < > = values in this interval not displayed.         Assessment and Plan  Multiple myeloma with high risk cytogenetics  - Miracle-RVD with VGPR, then underwent autologous transplant 11/11/21.   - Given his high risk cytogenetics he started miracle + revlimid maintenance therapy 2/21/22---ok to proceed with C37D1 today 10/9/24 as long as his home COVID test is negative  - Completed 2 years of zometa in Sept 2023. Continue Vit D.   - Revlimid was decreased to 21/28 days in Nov 2023 to try to minimize side effects (dysgeusia, flu-like symptoms on days following daratumumab)  - He is currently tolerating treatment well without side-effects.   - continue clonoseq from blood q 6 mnths (due in March) MRD+ below LOD    Ppx/ID  - Continue acyclovir daily for viral ppx   - COVID + 10/19/23.  Received COVID booster 02/02/24   - Pneumococcal vaccine given 02/02/24   - Got both Shingrix vaccines 3/4/24 and 5/7/2024  - Hep A vaccine given 5/14/24--to be repeated in 12 weeks  - MMR vaccine 5/14/24  - Received influenza and covid vaccines for 2024    Hypogammaglobulinemia   Start monthly IV Ig if IgG <300  - 11/8/24 IgG 558  - If continues to have  repeat URI could consider raising parameters for IVIG    Borderline microcytic Anemia:   -check retic hgb and ferritin    Borderline low Vit D  -recent level 22  - pt prefers to increase dietary sources rather than do a supplements.      30 minutes spent on the date of the encounter doing chart review, review of test results, interpretation of tests, patient visit, and documentation   Barbi Vazquez MD PhD

## 2024-11-26 NOTE — NURSING NOTE
"Oncology Rooming Note    November 26, 2024 2:20 PM   June Ronquillo is a 54 year old male who presents for:    Chief Complaint   Patient presents with    Blood Draw     Labs collected from venipuncture by RN. Vitals taken. Checked in for appointment(s).     Oncology Clinic Visit     Multiple myeloma     Initial Vitals: BP (!) 143/85   Pulse 81   Temp 97.9  F (36.6  C)   Resp 16   Wt 72.6 kg (160 lb)   SpO2 98%   BMI 25.82 kg/m   Estimated body mass index is 25.82 kg/m  as calculated from the following:    Height as of 10/9/24: 1.676 m (5' 6\").    Weight as of this encounter: 72.6 kg (160 lb). Body surface area is 1.84 meters squared.  No Pain (0) Comment: Data Unavailable   No LMP for male patient.  Allergies reviewed: Yes  Medications reviewed: Yes    Medications: Medication refills not needed today.  Pharmacy name entered into [employer]:    [facility] [place], [place] - [phone]  [facility] - [place], [place] - [address] [facility] [place], [place] [phone]  [facility] [place], [place] - [phone] [facility]  [facility] - [place], [place] - [address]    Frailty Screening:   Is the patient here for a new oncology consult visit in cancer care? 2. No      Clinical concerns: Patient states no new concerns to discuss with provider.       Zulema Manning, EMT            "

## 2024-11-26 NOTE — NURSING NOTE
Chief Complaint   Patient presents with    Blood Draw     Labs collected from venipuncture by RN. Vitals taken. Checked in for appointment(s).      Labs collected from venipuncture by RN. An additional ppl tube drawn for clonseq if provider decides to order and sent down as a JIC. Vitals taken. Checked in for appointment(s).

## 2024-11-26 NOTE — LETTER
11/26/2024      June Ronquillo  [address]  [facility] [id number]      Dear Colleague,    Thank you for referring your patient, June Ronquillo, to the [facility]. Please see a copy of my visit note below.        [facility]  Date of visit: 11/26/2024        Oncologic History  - 4/30/2021 M spike of 34.8 in urine.M spike in blood 0.2; IgG 702 with depressed IgA and IgM. Beta 2 microglobulin 2.7. Lambda  with K/L ratio of 0.01. WBC 11.1 with absolute eos of 1.0. hgb, plt, Cr (1.1), and albumin WNL.    -4/30/2021 CT abd/pelvis showed sclerotic marrow changes with numerous lytic appearing lesions throughout the imaged portions of the spine, pelvis and ribs.      -PET scan 5/11/2021 Numerous osteolytic lesions which predominantly demonstrate uptake below liver background levels. There is one intraosseous lesion in the left lateral 9th rib that demonstrates uptake above background, possibly due to nondisplaced fracture. Adjacent to this lesion is mild hypermetabolism to the left pleura, with new small left pleural effusion, suspicious for malignant effusion versus posttraumatic effusion. Pleural uptake may represent extramedullary myeloma extending along the intercostal space versus inflammation.    - BM bx 5/17/2021 with flow showing 4.0% plasma cells which express CD19 (dim), CD38 (bright), CD45 (dim), , and monotypic cytoplasmic lambda immunoglobulin light chains but lack CD20 and CD56.  Hypercellular bone marrow for age (approximately 75% cellularity) with adequate trilineage hematopoiesis. Plasma cell infiltrate: Interstitial, lambda monotypic and representing approximately 60% the bone marrow cellularity, by  immunohistochemical stain. Cytpgenetics with 2 related hypodiploid clones. One with loss of Y, monosomy 13 and 14 (5%) and one with translocation of 8p and 14, derivative 16 with long arm replaced by extra copy  1q,monosomy 21. FISH showed gain of 1q, loss of 13 and 14, IGH-MYC fusion t(8:14)    - Started Miracle-RVd 21 day with velcade on days 1,8,15.     -Cycle 2 Miracle-RVd. Dose reduce dex to 80 mg d/t fatigue and stomach upset on dex days. Also having cough with basilar streaking on CXR    - 8/31/2021 BM bx -rare suspicious plasma cells in touch imprint. No increase in plasma cells by morph.    -8/31/2021 PET/CT Diffuse osteolytic lesions throughout the axial and appendicular skeleton. Increased sclerosis since prior exam 5/11/2021 without increased metabolism or size.  Hypermetabolic pretracheal lymph node as well as hypermetabolic level 2 lymph nodes within the right neck. These lymph nodes are likely reactive from autoimmune activation via medical therapy. Hypermetabolic subcentimeter nodules within the thyroid gland    - 11/11/2021 Autologous BMT    - 2/21/22 started maintenance with Revlimid 10 mg and daratumumab monthly; did Revlimid alone for C1 due to low IgG levels, added daratumumab starting C2.  Initially did Revlimid daily, decreased to 21/28 days in Nov 2023 to help minimize side effects    Interval Hx:   Logan recent cold, taking decongestants. Creatinine was up slightlyon prior labs. He thinks it is due to inadequate fluid intake while he is taking decongestants.      ROS: 10 point ROS neg other than the symptoms noted above in the HPI.    Physical Exam:  BP (!) 143/85   Pulse 81   Temp 97.9  F (36.6  C)   Resp 16   Wt 72.6 kg (160 lb)   SpO2 98%   BMI 25.82 kg/m      Constitutional: NAD  Eyes: no scleral icterus  ENT: no oral lesions  Pulm: CTAB  CV: RRR, no m/r/g  GI: bowel sounds present, soft and nontender to palpation  MSK: No edema in the extremities  Neuro: alert, conversant, normal gait  Psych: appropriate mood and affect          Allergies   Allergen Reactions     Blood Transfusion Related (Informational Only) Other (See Comments)     Patient has a history of a clinically significant antibody  "against RBC antigens.  A delay in compatible RBCs may occur.      Current Outpatient Medications   Medication Sig Dispense Refill     albuterol (PROAIR HFA/PROVENTIL HFA/VENTOLIN HFA) 108 (90 Base) MCG/ACT inhaler Inhale 2 puffs into the lungs 4 times daily (Patient taking differently: Inhale 2 puffs into the lungs as needed.) 18 g 0     aspirin (ASA) 81 MG EC tablet Take 1 tablet (81 mg) by mouth daily       calcium carbonate-vitamin D (OSCAL) 500-5 MG-MCG tablet TAKE ONE TABLET BY MOUTH ONCE DAILY 90 tablet 3     [START ON 12/6/2024] LENalidomide (REVLIMID) 10 MG CAPS capsule Take 1 capsule (10 mg) by mouth daily Days 1-21 of each cycle 21 capsule 0     LENalidomide (REVLIMID) 10 MG CAPS capsule Take 1 capsule (10 mg) by mouth daily 21 capsule 0     levothyroxine (SYNTHROID/LEVOTHROID) 112 MCG tablet Take 1 tablet (112 mcg) by mouth daily 90 tablet 1     Needle, Disp, (B-D BLUNT FILL NEEDLE) 18G X 1-1/2\" MISC For use with testosterone injection: Disposable syringe and needles, use one 18 G needle to draw up medication then switch to 23 G injection needle 25 each 0     Needle, Disp, (BD DISP NEEDLE) 23G X 1\" MISC For use with testosterone injection: Disposable syringe and needles, use one 18 G needle to draw up medication then switch to 23 G injection needle 25 each 0     potassium chloride carson ER (KLOR-CON M10) 10 MEQ CR tablet Take 2 tablets (20 mEq) by mouth daily. 60 tablet 3     rosuvastatin (CRESTOR) 40 MG tablet Take 1 tablet (40 mg) by mouth daily. 90 tablet 4     syringe, disposable, 1 ML MISC For use with testosterone injection: Disposable syringe and 2 needles, use one 18 G needle to draw up medication then switch to 23 G injection needle 25 each 0     testosterone cypionate (DEPOTESTOSTERONE) 200 MG/ML injection Inject 0.75 mLs (150 mg) into the muscle every 14 days 10 mL 0     vitamin C with B complex (B COMPLEX-C) tablet Take 1 tablet by mouth daily. 90 tablet 3     acyclovir (ZOVIRAX) 400 MG tablet " Take 400 mg by mouth 2 times daily. (Patient not taking: Reported on 11/26/2024) 60 tablet 3     No current facility-administered medications for this visit.     Most Recent 3 CBC's:  Recent Labs   Lab Test 11/08/24  1053 10/09/24  1550 09/13/24  1043   WBC 5.9 6.5 4.8   HGB 12.0* 11.0* 11.7*   MCV 81 82 82    436 419   ANEUTAUTO 3.2 3.5 2.4     Most Recent 3 BMP's:  Recent Labs   Lab Test 11/25/24  0803 11/08/24  1053 10/09/24  1550 09/13/24  1043    136 137 139   POTASSIUM 3.8 4.2 3.7 3.9   CHLORIDE 104 105 104 107   CO2 25 21* 23 20*   BUN 10.1 14.8 13.7 18.2   CR 1.24* 1.17 1.35* 1.18*   ANIONGAP 12 10 10 12   HARDEEP 8.5* 9.1 8.6* 9.0   * 98 104* 138*   PROTTOTAL  --  6.6 6.1* 6.1*   ALBUMIN  --  4.2 4.0 4.3    Most Recent 3 LFT's:  Recent Labs   Lab Test 11/08/24  1053 10/09/24  1550 09/13/24  1043   AST 26 32 32   ALT 13 23 17   ALKPHOS 70 65 60   BILITOTAL 0.3 0.2 0.4    Most Recent 2 TSH and T4:  Recent Labs   Lab Test 11/08/24  1053 07/19/24  1001 12/19/22  1103 06/17/22  1332   TSH 0.76 0.05*   < > 0.07*   T4  --  1.20  --  1.36    < > = values in this interval not displayed.         Assessment and Plan  Multiple myeloma with high risk cytogenetics  - Miracle-RVD with VGPR, then underwent autologous transplant 11/11/21.   - Given his high risk cytogenetics he started miracle + revlimid maintenance therapy 2/21/22---ok to proceed with C37D1 today 10/9/24 as long as his home COVID test is negative  - Completed 2 years of zometa in Sept 2023. Continue Vit D.   - Revlimid was decreased to 21/28 days in Nov 2023 to try to minimize side effects (dysgeusia, flu-like symptoms on days following daratumumab)  - He is currently tolerating treatment well without side-effects.   - continue clonoseq from blood q 6 mnths (due in March) MRD+ below LOD    Ppx/ID  - Continue acyclovir daily for viral ppx   - COVID + 10/19/23.  Received COVID booster 02/02/24   - Pneumococcal vaccine given 02/02/24   - Got both  Shingrix vaccines 3/4/24 and 5/7/2024  - Hep A vaccine given 5/14/24--to be repeated in 12 weeks  - MMR vaccine 5/14/24  - Received influenza and covid vaccines for 2024    Hypogammaglobulinemia   Start monthly IV Ig if IgG <300  - 11/8/24 IgG 558  - If continues to have repeat URI could consider raising parameters for IVIG    Borderline microcytic Anemia:   -check retic hgb and ferritin    Borderline low Vit D  -recent level 22  - pt prefers to increase dietary sources rather than do a supplements.      30 minutes spent on the date of the encounter doing chart review, review of test results, interpretation of tests, patient visit, and documentation   Barbi Vazquez MD PhD      Again, thank you for allowing me to participate in the care of your patient.        Sincerely,        Barbi Vazquez MD

## 2024-11-27 LAB
ALBUMIN SERPL ELPH-MCNC: 4 G/DL (ref 3.7–5.1)
ALPHA1 GLOB SERPL ELPH-MCNC: 0.3 G/DL (ref 0.2–0.4)
ALPHA2 GLOB SERPL ELPH-MCNC: 0.6 G/DL (ref 0.5–0.9)
B-GLOBULIN SERPL ELPH-MCNC: 0.7 G/DL (ref 0.6–1)
GAMMA GLOB SERPL ELPH-MCNC: 0.6 G/DL (ref 0.7–1.6)
IGA SERPL-MCNC: 52 MG/DL (ref 84–499)
IGG SERPL-MCNC: 534 MG/DL (ref 610–1616)
IGM SERPL-MCNC: 86 MG/DL (ref 35–242)
KAPPA LC FREE SER-MCNC: 1.24 MG/DL (ref 0.33–1.94)
KAPPA LC FREE/LAMBDA FREE SER NEPH: 1.2 {RATIO} (ref 0.26–1.65)
LAMBDA LC FREE SERPL-MCNC: 1.03 MG/DL (ref 0.57–2.63)
LOCATION OF TASK: ABNORMAL
LOCATION OF TASK: NORMAL
M PROTEIN SERPL ELPH-MCNC: 0.1 G/DL
PROT PATTERN SERPL ELPH-IMP: ABNORMAL
PROT PATTERN SERPL IFE-IMP: NORMAL
TESTOST SERPL-MCNC: 736 NG/DL (ref 240–950)

## 2024-11-30 DIAGNOSIS — D50.8 IRON DEFICIENCY ANEMIA SECONDARY TO INADEQUATE DIETARY IRON INTAKE: Primary | ICD-10-CM

## 2024-11-30 RX ORDER — FERROUS SULFATE 325(65) MG
325 TABLET ORAL
Qty: 30 TABLET | Refills: 2 | Status: SHIPPED | OUTPATIENT
Start: 2024-11-30

## 2024-12-02 NOTE — H&P
[facility]    History and Physical - Hospitalist Service       Date of Admission:  4/19/2023    Assessment & Plan      June Ronquillo is a 52 year old male admitted on 4/19/2023. He has a history of multiple myeloma with high risk cytogenetics s/p autoBMT now on maintenance therapy with daratumumab, zometa, and revlimid presenting with fever, rigors, chest pain, and shortness of breath.     #) Fever and rigors  #) Sepsis  #) right lower lobe consolidation  #) shortness of breath    It is not clear whether there is a single underlying process or one more acute process superimposed on the other.  Patient was noted to have a right upper lobe groundglass opacity in December and had been planning to see pulmonology in April 2023.  However, he also developed severe cough in January and February that is somewhat resolved.  He has a new, more acute syndrome in the last 2 days with significant rigors, chest pain, shortness of breath, and fatigue.  Imaging reveals more consolidation in the right lower lobe along with mediastinal lymphadenopathy.  While an underlying, progressive concern such as invasive fungal disease or mycobacterial disease is possible, the more acute syndrome the last few days seems more consistent with a typical bacterial pneumonia.  Will be important to ensure that even if the acute syndrome resolves, that the more chronic syndrome is addressed.  His chest pain is relatively acute as well and is consistent with acute pneumonia.    -Admit to inpatient  -Follow-up blood cultures  -Follow-up urine culture  -COVID-19 PCR negative  -Continue piperacillin/tazobactam 4.5 g every 6 hours for pseudomonal coverage  -Low threshold to add vancomycin if the patient worsens  -Transplant ID consult tomorrow  -Send galactomannan, Fungitell, procalcitonin, sputum culture and Gram stain, LDH, and sputum evaluation for PJP  -Continue fluids overnight, plan to stop  MyChart Communication   tomorrow; at high risk for worsening and development of sepsis physiology.  -Oxycodone for chest pain    #) Mildly elevated creatine: Just above baseline, continue to monitor    #) Myeloma with high risk features: Followed as an outpatient by Dr. Vazquez.  - plan to continue present therapy with daily revlimid, defer remainder to primary team  - may benefit from IgG, level ordered  - continue acyclovir prophylaxis  - I note that TMP-SMX was not on his medication list; please address if needed           Diet: Combination Diet Regular Diet Adult  DVT Prophylaxis: Enoxaparin (Lovenox) SQ  Segura Catheter: Not present  Lines: None     Cardiac Monitoring: None  Code Status: Full Code    Clinically Significant Risk Factors Present on Admission          # Hypocalcemia: Lowest Ca = 8.4 mg/dL in last 2 days, will monitor and replace as appropriate         # Hypertension: home medication list includes antihypertensive(s)              Disposition Plan      Expected Discharge Date: 04/21/2023                  Gerard Negrete MD  Hospitalist Service  [facility]  Securely message with [employer] (more info)  Text page via [facility] Paging/Directory     ______________________________________________________________________    Chief Complaint   Fever and shaking chills    History is obtained from the patient    History of Present Illness   June Ronquillo is a 52 year old male who has a history of multiple myeloma with high risk cytogenetics status post autologous bone marrow transplantation now maintained on daratumumab, Zometa, and Revlimid who is presenting with 3 days of fever, rigors, chest pain, and shortness of breath.  The patient states that he had actually been feeling fairly well until 3 days ago when he developed acute right-sided posterior chest pain along with severe difficulty breathing such that it was difficult to ambulate around his home, as well as fevers.  He was  unable to get warm and had to wear multiple layers and at times had shaking chills where he could not stop shaking.  He was seen in the infusion center today and given the severity of the symptoms, he was administered fluids and Zosyn and referred to the emergency department for further care    Of note, the patient was noted to have a right upper lobe groundglass opacity in December and the plan was for him to follow-up with pulmonology in April; that visit is scheduled for next week.  He states that in January and February had a severe cough that mostly resolved.  He has been feeling tired in the last 2 days, but otherwise has no new symptoms.  He has no known sick contacts.  He has no known history of tuberculosis, although he did grow up in [place].      Past Medical History    Past Medical History:   Diagnosis Date     CAD (coronary artery disease)     s/p stent to CFX     Heart attack (H)      Hyperlipidemia LDL goal <100      Hypertension      Stented coronary artery      Thyroid disease        Past Surgical History   Past Surgical History:   Procedure Laterality Date     BONE MARROW BIOPSY, BONE SPECIMEN, NEEDLE/TROCAR Left 5/17/2021    Procedure: BIOPSY, BONE MARROW with aspiration and ancillary studies;  Surgeon: Niharika Regalado MD;  Location: [facility] OR     BONE MARROW BIOPSY, BONE SPECIMEN, NEEDLE/TROCAR Left 10/14/2021    Procedure: BIOPSY, BONE MARROW;  Surgeon: Alexa Albert APRN CNP;  Location: [facility] OR     BONE MARROW BIOPSY, BONE SPECIMEN, NEEDLE/TROCAR Right 3/7/2022    Procedure: BIOPSY, BONE MARROW;  Surgeon: Deborah Carmona PA-C;  Location: [facility] OR     BONE MARROW BIOPSY, BONE SPECIMEN, NEEDLE/TROCAR N/A 5/9/2022    Procedure: BIOPSY, BONE MARROW;  Surgeon: Deborah Leblanc PA-C;  Location: [facility] OR     BONE MARROW BIOPSY, BONE SPECIMEN, NEEDLE/TROCAR Left 12/5/2022    Procedure: BIOPSY, BONE MARROW;  Surgeon: Alba Moses APRN CNP;  Location: [facility] OR     HEART CATH PTCA SINGLE  VESSEL  10/10/2004    Stent to [facility]gifty      INSERT CATHETER VASCULAR ACCESS Right 11/3/2021    Procedure: NON VALVED TUNNELED DUAL LUMEN APHARESIS CAPABLE LINE INSERTION, VASCULAR ACCESS CATHETER;  Surgeon: Werner Mcnamara MD;  Location: [facility] OR     IR CVC TUNNEL PLACEMENT > 5 YRS OF AGE  11/3/2021     IR CVC TUNNEL REMOVAL RIGHT  12/2/2021     THYROIDECTOMY N/A 5/3/2022    Procedure: total thyroidectomy, Left selective neck dissection level 6 and level 7 ;  Surgeon: Clarita Sepulveda MD;  [facility] [facility] [place]       Prior to Admission Medications   Prior to Admission Medications   Prescriptions Last Dose Informant Patient Reported? Taking?   LENalidomide (REVLIMID) 10 MG CAPS capsule 4/19/2023  No Yes   Sig: Take 1 capsule (10 mg) by mouth daily   acetaminophen (TYLENOL) 500 MG tablet 4/19/2023  Yes Yes   Sig: Take 500-1,000 mg by mouth every 6 hours as needed for mild pain   acyclovir (ZOVIRAX) 800 MG tablet 4/19/2023  No Yes   Sig: TAKE ONE TABLET BY MOUTH TWICE A DAY   aspirin (ASA) 81 MG EC tablet 4/19/2023  Yes Yes   Sig: Take 1 tablet (81 mg) by mouth daily   calcium carb-cholecalciferol (OS-HARDEEP) 500-200 MG-UNIT tablet 4/19/2023  No Yes   Sig: Take 1 tablet by mouth daily   calcium carbonate (TUMS) 500 MG chewable tablet 4/19/2023  No Yes   Sig: Take 2 tablets (1,000 mg) by mouth 3 times daily   fluticasone (FLONASE) 50 MCG/ACT nasal spray 4/19/2023  No Yes   Sig: Spray 1 spray into both nostrils daily   levothyroxine (SYNTHROID/LEVOTHROID) 125 MCG tablet 4/19/2023  No Yes   Sig: Take 1 tablet (125 mcg) by mouth daily   losartan (COZAAR) 50 MG tablet 4/19/2023  No Yes   Sig: Take 1 tablet (50 mg) by mouth daily   meclizine (ANTIVERT) 25 MG tablet 4/19/2023  No Yes   Sig: Take 1 tablet (25 mg) by mouth 3 times daily as needed for dizziness   potassium chloride ER (KLOR-CON M) 10 MEQ CR tablet 4/19/2023  No Yes   Sig: Take 2 tablets (20 mEq) by mouth daily   rosuvastatin (CRESTOR) 40 MG tablet  4/19/2023  No Yes   Sig: Take 1 tablet (40 mg) by mouth daily   vitamin B complex with vitamin C (STRESS TAB) tablet 4/19/2023  No Yes   Sig: Take 1 tablet by mouth daily      Facility-Administered Medications: None        Review of Systems    The 10 point Review of Systems is negative other than noted in the HPI or here.      Physical Exam   Vital Signs: Temp: 98  F (36.7  C) Temp src: Oral BP: 131/81 Pulse: 92   Resp: 16 SpO2: 100 % O2 Device: None (Room air)    Weight: 148 lbs 9.44 oz    General Appearance: Tired, sitting up on the gurney, conversational  Eyes: Extraocular movements intact, no conjunctival injection  HEENT: Mucous membranes moist, no oral lesions or thrush  Respiratory: Markedly diminished breath sounds at the right base with a few bronchial breath sounds heard.  Other lung sounds clear  Cardiovascular: Normal rate, regular rhythm.  No murmurs, rubs, gallops  GI: Soft, nontender, nondistended.  No hepatosplenomegaly or mass.  Lymph/Hematologic: No cervical adenopathy  Skin: No rash  Musculoskeletal: Normal bulk and tone throughout.  No lower extremity edema  Neurologic: Moves all extremities well  Psychiatric: Full range of affect    Medical Decision Making       90 MINUTES SPENT BY ME on the date of service doing chart review, history, exam, documentation & further activities per the note.      Data     I have personally reviewed the following data over the past 24 hrs:    7.6  \   10.3 (L)   / 377     136 105 10.5 /  119 (H)   3.4 19 (L) 1.23 (H) \       ALT: 16 AST: 23 AP: 68 TBILI: 0.5   ALB: 3.8 TOT PROTEIN: 6.3 (L) LIPASE: 21       Trop: 9 BNP: N/A       Procal: 0.21 (H) CRP: N/A Lactic Acid: 0.6 (L)

## 2024-12-03 ENCOUNTER — INFUSION THERAPY VISIT (OUTPATIENT)
Dept: ONCOLOGY | Facility: CLINIC | Age: 54
End: 2024-12-03
Attending: STUDENT IN AN ORGANIZED HEALTH CARE EDUCATION/TRAINING PROGRAM
Payer: COMMERCIAL

## 2024-12-03 VITALS
OXYGEN SATURATION: 100 % | RESPIRATION RATE: 18 BRPM | DIASTOLIC BLOOD PRESSURE: 83 MMHG | SYSTOLIC BLOOD PRESSURE: 155 MMHG | BODY MASS INDEX: 25.78 KG/M2 | WEIGHT: 159.7 LBS | TEMPERATURE: 97.8 F | HEART RATE: 61 BPM

## 2024-12-03 DIAGNOSIS — C90.00 MULTIPLE MYELOMA NOT HAVING ACHIEVED REMISSION (H): Primary | ICD-10-CM

## 2024-12-03 LAB
ALBUMIN SERPL BCG-MCNC: 4.4 G/DL (ref 3.5–5.2)
ALP SERPL-CCNC: 70 U/L (ref 40–150)
ALT SERPL W P-5'-P-CCNC: 21 U/L (ref 0–70)
ANION GAP SERPL CALCULATED.3IONS-SCNC: 11 MMOL/L (ref 7–15)
AST SERPL W P-5'-P-CCNC: 29 U/L (ref 0–45)
BASOPHILS # BLD AUTO: 0 10E3/UL (ref 0–0.2)
BASOPHILS NFR BLD AUTO: 0 %
BILIRUB SERPL-MCNC: 0.3 MG/DL
BUN SERPL-MCNC: 13 MG/DL (ref 6–20)
CALCIUM SERPL-MCNC: 8.7 MG/DL (ref 8.8–10.4)
CHLORIDE SERPL-SCNC: 104 MMOL/L (ref 98–107)
CREAT SERPL-MCNC: 1.17 MG/DL (ref 0.67–1.17)
EGFRCR SERPLBLD CKD-EPI 2021: 73 ML/MIN/1.73M2
EOSINOPHIL # BLD AUTO: 0.2 10E3/UL (ref 0–0.7)
EOSINOPHIL NFR BLD AUTO: 4 %
ERYTHROCYTE [DISTWIDTH] IN BLOOD BY AUTOMATED COUNT: 19.9 % (ref 10–15)
GLUCOSE SERPL-MCNC: 112 MG/DL (ref 70–99)
HCO3 SERPL-SCNC: 23 MMOL/L (ref 22–29)
HCT VFR BLD AUTO: 37.3 % (ref 40–53)
HGB BLD-MCNC: 11.9 G/DL (ref 13.3–17.7)
IMM GRANULOCYTES # BLD: 0 10E3/UL
IMM GRANULOCYTES NFR BLD: 0 %
LYMPHOCYTES # BLD AUTO: 1.8 10E3/UL (ref 0.8–5.3)
LYMPHOCYTES NFR BLD AUTO: 37 %
MCH RBC QN AUTO: 26.2 PG (ref 26.5–33)
MCHC RBC AUTO-ENTMCNC: 31.9 G/DL (ref 31.5–36.5)
MCV RBC AUTO: 82 FL (ref 78–100)
MONOCYTES # BLD AUTO: 0.6 10E3/UL (ref 0–1.3)
MONOCYTES NFR BLD AUTO: 13 %
NEUTROPHILS # BLD AUTO: 2.3 10E3/UL (ref 1.6–8.3)
NEUTROPHILS NFR BLD AUTO: 46 %
NRBC # BLD AUTO: 0 10E3/UL
NRBC BLD AUTO-RTO: 0 /100
PLATELET # BLD AUTO: 382 10E3/UL (ref 150–450)
POTASSIUM SERPL-SCNC: 3.8 MMOL/L (ref 3.4–5.3)
PROT SERPL-MCNC: 6.8 G/DL (ref 6.4–8.3)
RBC # BLD AUTO: 4.54 10E6/UL (ref 4.4–5.9)
SODIUM SERPL-SCNC: 138 MMOL/L (ref 135–145)
TOTAL PROTEIN SERUM FOR ELP: 6.6 G/DL (ref 6.4–8.3)
WBC # BLD AUTO: 4.9 10E3/UL (ref 4–11)

## 2024-12-03 PROCEDURE — 82784 ASSAY IGA/IGD/IGG/IGM EACH: CPT

## 2024-12-03 PROCEDURE — 83521 IG LIGHT CHAINS FREE EACH: CPT

## 2024-12-03 PROCEDURE — 80053 COMPREHEN METABOLIC PANEL: CPT

## 2024-12-03 PROCEDURE — 85004 AUTOMATED DIFF WBC COUNT: CPT

## 2024-12-03 PROCEDURE — 96401 CHEMO ANTI-NEOPL SQ/IM: CPT

## 2024-12-03 PROCEDURE — 36415 COLL VENOUS BLD VENIPUNCTURE: CPT

## 2024-12-03 PROCEDURE — 84155 ASSAY OF PROTEIN SERUM: CPT

## 2024-12-03 PROCEDURE — 82040 ASSAY OF SERUM ALBUMIN: CPT

## 2024-12-03 PROCEDURE — 85048 AUTOMATED LEUKOCYTE COUNT: CPT

## 2024-12-03 PROCEDURE — 84165 PROTEIN E-PHORESIS SERUM: CPT | Mod: TC | Performed by: STUDENT IN AN ORGANIZED HEALTH CARE EDUCATION/TRAINING PROGRAM

## 2024-12-03 PROCEDURE — 86334 IMMUNOFIX E-PHORESIS SERUM: CPT | Performed by: STUDENT IN AN ORGANIZED HEALTH CARE EDUCATION/TRAINING PROGRAM

## 2024-12-03 PROCEDURE — 250N000011 HC RX IP 250 OP 636: Mod: JZ | Performed by: STUDENT IN AN ORGANIZED HEALTH CARE EDUCATION/TRAINING PROGRAM

## 2024-12-03 PROCEDURE — 85014 HEMATOCRIT: CPT

## 2024-12-03 RX ADMIN — DARATUMUMAB AND HYALURONIDASE-FIHJ (HUMAN RECOMBINANT) 1800 MG: 1800; 30000 INJECTION SUBCUTANEOUS at 14:40

## 2024-12-03 ASSESSMENT — PAIN SCALES - GENERAL: PAINLEVEL_OUTOF10: NO PAIN (0)

## 2024-12-03 NOTE — PROGRESS NOTES
Infusion Nursing Note:  June Ronquillo presents today for C38 D1 Darzalex Faspro.    Patient seen by provider today: No   present during visit today: Not Applicable.    Note: Patient presents to the infusion center feeling well today. Denies fevers, chills, cough, SOB, or chest pain. Patient wishes to proceed with treatment today.    Reports he is taking his vitamin D and revlimid.       Intravenous Access:  No Intravenous access at this visit.    Treatment Conditions:  Lab Results   Component Value Date    HGB 11.9 (L) 12/03/2024    WBC 4.9 12/03/2024    ANEU 4.7 03/13/2023    ANEUTAUTO 2.3 12/03/2024     12/03/2024        Lab Results   Component Value Date     12/03/2024    POTASSIUM 3.8 12/03/2024    MAG 2.6 (H) 04/28/2023    CR 1.17 12/03/2024    HARDEEP 8.7 (L) 12/03/2024    BILITOTAL 0.3 12/03/2024    ALBUMIN 4.4 12/03/2024    ALT 21 12/03/2024    AST 29 12/03/2024       Not Applicable.      Post Infusion Assessment:  Patient tolerated Darzalex Faspro injection to the RLQ of the abdomen without incident.       Discharge Plan:   Patient declined prescription refills.  Discharge instructions reviewed with: Patient.  Patient and/or family verbalized understanding of discharge instructions and all questions answered.  AVS to patient via [employer].  Patient will return 01/03/2025 for next appointment.   Patient discharged in stable condition accompanied by: self.  Departure Mode: Ambulatory.      Dasha Allen RN

## 2024-12-03 NOTE — PATIENT INSTRUCTIONS
[facility] Triage and after hours / weekends / holidays:  [phone]    Please call the triage or after hours line if you experience a temperature greater than or equal to 100.4, shaking chills, have uncontrolled nausea, vomiting and/or diarrhea, dizziness, shortness of breath, chest pain, bleeding, unexplained bruising, or if you have any other new/concerning symptoms, questions or concerns.      If you are having any concerning symptoms or wish to speak to a provider before your next infusion visit, please call triage to notify them so we can adequately serve you.     If you need a refill on a narcotic prescription or other medication, please call before your infusion appointment.

## 2024-12-04 LAB
ALBUMIN SERPL ELPH-MCNC: 4.3 G/DL (ref 3.7–5.1)
ALPHA1 GLOB SERPL ELPH-MCNC: 0.3 G/DL (ref 0.2–0.4)
ALPHA2 GLOB SERPL ELPH-MCNC: 0.6 G/DL (ref 0.5–0.9)
B-GLOBULIN SERPL ELPH-MCNC: 0.8 G/DL (ref 0.6–1)
GAMMA GLOB SERPL ELPH-MCNC: 0.7 G/DL (ref 0.7–1.6)
IGA SERPL-MCNC: 56 MG/DL (ref 84–499)
IGG SERPL-MCNC: 604 MG/DL (ref 610–1616)
IGM SERPL-MCNC: 120 MG/DL (ref 35–242)
KAPPA LC FREE SER-MCNC: 0.98 MG/DL (ref 0.33–1.94)
KAPPA LC FREE/LAMBDA FREE SER NEPH: 1.36 {RATIO} (ref 0.26–1.65)
LAMBDA LC FREE SERPL-MCNC: 0.72 MG/DL (ref 0.57–2.63)
M PROTEIN SERPL ELPH-MCNC: 0 G/DL
PROT PATTERN SERPL ELPH-IMP: NORMAL
PROT PATTERN SERPL IFE-IMP: NORMAL

## 2024-12-11 ENCOUNTER — OFFICE VISIT (OUTPATIENT)
Dept: ENDOCRINOLOGY | Facility: CLINIC | Age: 54
End: 2024-12-11
Payer: COMMERCIAL

## 2024-12-11 VITALS
WEIGHT: 160.6 LBS | DIASTOLIC BLOOD PRESSURE: 81 MMHG | OXYGEN SATURATION: 98 % | BODY MASS INDEX: 25.92 KG/M2 | HEART RATE: 81 BPM | SYSTOLIC BLOOD PRESSURE: 147 MMHG

## 2024-12-11 DIAGNOSIS — R79.89 LOW TESTOSTERONE IN MALE: ICD-10-CM

## 2024-12-11 DIAGNOSIS — E55.9 VITAMIN D INSUFFICIENCY: ICD-10-CM

## 2024-12-11 DIAGNOSIS — C73 PAPILLARY MICROCARCINOMA OF THYROID (H): Primary | ICD-10-CM

## 2024-12-11 DIAGNOSIS — E06.3 HYPOTHYROIDISM DUE TO HASHIMOTO'S THYROIDITIS: ICD-10-CM

## 2024-12-11 DIAGNOSIS — E03.9 HYPOTHYROIDISM, UNSPECIFIED TYPE: ICD-10-CM

## 2024-12-11 RX ORDER — LEVOTHYROXINE SODIUM 112 UG/1
112 TABLET ORAL DAILY
Qty: 90 TABLET | Refills: 3 | Status: SHIPPED | OUTPATIENT
Start: 2024-12-11

## 2024-12-11 RX ORDER — NEEDLES, DISPOSABLE 25GX5/8"
NEEDLE, DISPOSABLE MISCELLANEOUS
Qty: 25 EACH | Refills: 3 | Status: SHIPPED | OUTPATIENT
Start: 2024-12-11

## 2024-12-11 RX ORDER — TESTOSTERONE CYPIONATE 200 MG/ML
150 INJECTION, SOLUTION INTRAMUSCULAR
Qty: 10 ML | Refills: 0 | Status: SHIPPED | OUTPATIENT
Start: 2024-12-11

## 2024-12-11 NOTE — PATIENT INSTRUCTIONS
-Adjust calcium supplement to 500 mg twice daily with meals, lunch and dinner (calcium needs to be  from levothyroxine by at least 4 hours)  -Start vitamin D3 1000 IU daily  -Otherwise, continue current medications without changes  -Schedule neck ultrasound  (neck ultrasound to be completed at the [facility] in [place])  -Check thyroid function tests in 6 months  -Follow-up in 2 year, with labs (midway between testosterone injections) and neck ultrasound performed prior to visit (neck ultrasound to be completed at the [facility] in [place])  -We will communicate results via [employer], or if needed by phone

## 2024-12-11 NOTE — PROGRESS NOTES
ENDOCRINOLOGY FOLLOW-UP         HISTORY OF PRESENT ILLNESS    June Ronquillo is seen for follow-up via billable video visit.    1.  Hypogonadism.    Taking testosterone 150 mg IM every 2 weeks: Typically injects on Mondays.    Energy is quite good.  Libido is normal.  No urinary symptoms such as retention or hesitancy.    Has been having some difficulty with getting testosterone and injection supplies consistently due to issues with communication between pharmacy and our clinic.    2.  Thyroid cancer.  No change in the feel of his neck.    Thyroglobulin panel to [facility] 7/19/2024: Thyroglobulin antibody less than 0.4 U/mL, thyroglobulin less than 0.1 nanograms per milliliter.  Concurrent TSH 0.05.     Neck ultrasound performed on 12/5/2023 showed lymph nodes which did not have clearly suspicious features--did not have a chance to complete neck ultrasound prior to this visit.    Last year, PET/CT on 11/15/2023 showed moderately avid uptake in bilateral cervical lymph nodes which otherwise did not have suspicious features: This was favored to be reactive.    3.  Hypothyroidism.  We reduced levothyroxine from 125 to 112 mcg daily in 8/2024.  Continues on this dose.    4.  Bone health.  Takes calcium supplement, thinks 600 mg, once daily in the evening ( from levothyroxine by at least 4 hours).  I had recommended starting vitamin D3 supplement after last visit but Logan has not yet had a chance to do this.    Completed last dose of Zometa on 11/15/2023.    DXA scan performed in 11/2023 showed low bone density.    Pertinent endocrine and related history:  1. Hypothyroidism. Diagnosed in 1/2021, attributed to Hashimoto's thyroiditis.    2. PTC. During imaging for evaluation of multiple myeloma, he was incidentally discovered to have increased FDG uptake in thyroid nodules in 8/2021 and in 10/2021, with findings as below:  - PET/CT 8/31/2021: Hypermetabolic cervical lymph nodes, 3 separate hypermetabolic foci in  the thyroid gland (2 in the left lobe, 1 in the right lobe).  CT appearance of nodules was hypoattenuating with a bulky calcification in one of the left thyroid lobe nodules.  SUV max of thyroid lesions 4.95.  - PET/CT 10/15/2021: Previous mildly enlarged FDG avid right upper cervical lymph nodes were resolved, likely inflammatory.  Few tiny thyroid nodules, similar in size, with decreased (now at most mild) uptake (SUV max 2.5).  -He does not have history of excessive head or neck radiation exposure.  No family history of thyroid disease/thyroid cancer.  -After initial visit in 10/2021, I referred patient for thyroid US which was performed on 10/28/2021. This showed bilateral thyroid nodules and heterogenous thyroid parenchyma.  -He had FNA of left lobe nodule performed on 11/2/2021. Cytology returned consistent with papillary thyroid carcinoma.  -Detailed neck US performed 11/8/2021 showed no suspicious appearing cervical nodes.  -Underwent total thyroidectomy with left selective neck dissection to include levels 6 and 7 on 5/3/2022 with Dr. Sepulveda.  Surgical pathology was as follows:  A.  THYROID GLAND, TOTAL THYROIDECTOMY:  -PAPILLARY THYROID MICROCARCINOMA, conventional/classic type, 0.6 cm in greatest dimension.  -Tumor is located in the mid left lobe, encapsulated, and limited to thyroid gland (no extrathyroidal extension identified).  -Margins are negative for tumor (closest is anterior left lobe surface at 1 mm).  -Benign adenomatoid and colloid nodules up to 0.6 cm in greatest dimension.  -Chronic lymphocytic thyroiditis.  -Biopsy site changes.  -Fragments of parathyroid tissue with normal cellularity (adjacent to mid/lower right lobe).  -One perithyroid lymph node, negative for malignancy (0/1).  -AJCC pathologic staging is pT1a N0a.  -See comment and synoptic report.     B.  LEFT LEVEL 6 AND 7 NODE, EXCISION:  -Parathyroid gland with normal cellularity.  -No lymph node identified.  -Benign  "fibroadipose tissue.    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    3. Multiple myeloma. Diagnosed in 4/2021.  He was initiated on treatment with daratumumab, Velcade, Revlimid and dexamethasone.  S/p autologous PBSCT.   4. High risk for diabetes.  5.  Hypogonadism.  His total testosterone has been low, free testosterone at the very low end of normal while gonadotropins have been elevated in a pattern suspicious for compensated hypogonadism, perhaps related to chemotherapy. Unremarkable testicular ultrasound.  -On testosterone since ~ 10/2023.    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    PAST MEDICAL HISTORY  Past Medical History:   Diagnosis Date    CAD (coronary artery disease)     s/p stent to CFX    Cancer (H) 2019    Heart attack (H)     Hyperlipidemia LDL goal <100     Hypertension     Stented coronary artery     Thyroid disease        MEDICATIONS  Current Outpatient Medications   Medication Sig Dispense Refill    albuterol (PROAIR HFA/PROVENTIL HFA/VENTOLIN HFA) 108 (90 Base) MCG/ACT inhaler Inhale 2 puffs into the lungs 4 times daily 18 g 0    aspirin (ASA) 81 MG EC tablet Take 1 tablet (81 mg) by mouth daily      calcium carbonate-vitamin D (OSCAL) 500-5 MG-MCG tablet TAKE ONE TABLET BY MOUTH ONCE DAILY 90 tablet 3    ferrous sulfate (FEROSUL) 325 (65 Fe) MG tablet Take 1 tablet (325 mg) by mouth daily (with breakfast). 30 tablet 2    LENalidomide (REVLIMID) 10 MG CAPS capsule Take 1 capsule (10 mg) by mouth daily 21 capsule 0    levothyroxine (SYNTHROID/LEVOTHROID) 112 MCG tablet Take 1 tablet (112 mcg) by mouth daily. 90 tablet 3    Needle, Disp, (B-D BLUNT FILL NEEDLE) 18G X 1-1/2\" MISC For use with testosterone injection: Disposable syringe and needles, use one 18 G needle to draw up medication then switch to 23 G injection needle 25 each 3    Needle, Disp, (BD DISP NEEDLE) 23G X 1\" MISC For use with testosterone " injection: Disposable syringe and needles, use one 18 G needle to draw up medication then switch to 23 G injection needle 25 each 3    potassium chloride carson ER (KLOR-CON M10) 10 MEQ CR tablet Take 2 tablets (20 mEq) by mouth daily. 60 tablet 3    rosuvastatin (CRESTOR) 40 MG tablet Take 1 tablet (40 mg) by mouth daily. 90 tablet 4    syringe, disposable, 1 ML MISC For use with testosterone injection: Disposable syringe and 2 needles, use one 18 G needle to draw up medication then switch to 23 G injection needle 25 each 3    testosterone cypionate (DEPOTESTOSTERONE) 200 MG/ML injection Inject 0.75 mLs (150 mg) into the muscle every 14 days. 10 mL 0    vitamin C with B complex (B COMPLEX-C) tablet Take 1 tablet by mouth daily. 90 tablet 3       Allergies, family, and social history were reviewed and documented as needed in EHR.     REVIEW OF SYSTEMS  A focused ROS was performed, with pertinent positives and negatives as noted in the HPI.    PHYSICAL EXAM  BP (!) 147/81 (BP Location: Right arm)   Pulse 81   Wt 72.8 kg (160 lb 9.6 oz)   SpO2 98%   BMI 25.92 kg/m    Body mass index is 25.92 kg/m .  Constitutional: Vital signs reviewed, as recorded above. Patient is alert, oriented and appears in no acute distress.  Eyes: PER, EOMI, no stare, lid lag, or retraction; no conjunctival injection.  ENMT: OP clear with moist MM. Lips are without lesions.   Neck: Neck supple, no palpable thyroid tissue or neck masses.  Lymphatic: No cervical or supraclavicular LAD.  Cardiovascular: RRR, normal S1/S2, no audible murmurs, rubs or gallops; no LE edema.  Respiratory: CTAB, without wheezes, crackles or rhonchi; normal chest wall motion and respiratory effort.  MSK: No clubbing or cyanosis; normal muscle bulk and tone.  Skin: Normal skin color, temperature, turgor and texture.  Neurological: Alert and oriented times 3. No tremor.      DATA REVIEW  Each of the following laboratory and/or imaging studies were  reviewed.              ASSESSMENT  1.  Elevated gonadotropins.  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  Normal testicular ultrasound.  On testosterone, with improvement in hypogonadal symptoms and normal testosterone on previsit labs.  CBC shows anemia, no erythrocytosis.  PSA is in normal range.  Continue current regimen without changes.    2.  Papillary thyroid microcarcinoma.  Status post total thyroidectomy and left selective neck dissection.  T1a NX MX.  Surgical pathology indicates low risk of recurrence/persistent disease based on KRISTEN risk stratification, therefore radioiodine therapy has been deferred to date.  Thyroglobulin panel in 7/2024 showed undetectable thyroglobulin level; no clearly abnormal/suspicious lymphadenopathy on neck ultrasound in 12/2023.  Based on these findings, recommend careful follow-up--have reminded Logan schedule follow-up neck ultrasound.  Continue thyroid hormone suppression as below.    3.  Hypothyroidism.  Diagnosed in 1/2021, preceding thyroidectomy and likely due to Hashimoto's thyroiditis.  Given response to treatment (with low thyroglobulin level and no suspicious nodes on ultrasound) and given low KRISTEN risk status, our TSH goal is between lower limit of normal and 2.  TSH drawn prior to this visit is in ideal range.  Continue levothyroxine.    4.  Multiple myeloma.  Following in oncology.  Status post chemotherapy and autologous PBSCT. On maintenance therapy and monthly Zometa.    5.  Hypocalcemia.  Potentially related to insufficient calcium intake, completed antiresorptive therapy with Zometa in 9/2023.  Would recommend adjusting calcium supplement dose upward and starting vitamin D3.    6.  High risk for diabetes.  A1c in 11/2024 is slightly improved.    7.  Bone health.  DXA scan performed on 11/10/2023 showed low bone density.  FRAX calculated  10-year risk for fracture is 9.8% osteoporotic fracture, 1.2% for hip fracture in the next 10 years.  Has completed course of Zometa in oncology.  Address calcium/vitamin D status as discussed already.    PLAN  -Adjust calcium supplement to 500 mg twice daily with meals, lunch and dinner (calcium needs to be  from levothyroxine by at least 4 hours)  -Start vitamin D3 1000 IU daily  -Otherwise, continue current medications without changes  -Schedule neck ultrasound  (neck ultrasound to be completed at the [facility] [place])  -Check thyroid function tests in 6 months  -Follow-up in 2 year, with labs (midway between testosterone injections) and neck ultrasound performed prior to visit (neck ultrasound to be completed at the [facility] [place])  -We will communicate results via [facility], or if needed by phone      Orders Placed This Encounter   Procedures    TSH with free T4 reflex    Thyroglobulin and Antibody (Sendout to [facility])    Vitamin D Deficiency    Basic metabolic panel    Albumin level       I spent a total of 54 minutes on the date of encounter reviewing medical records, evaluating the patient, coordinating care and documenting in the EHR, as detailed above.      Makeda Elder MD   Division of Diabetes, Endocrinology and Metabolism  Department of Medicine

## 2024-12-11 NOTE — LETTER
12/11/2024      June Ronquillo  [address]  [facility] [address]      Dear Colleague,    Thank you for referring your patient, June Ronquillo, to the [facility]. Please see a copy of my visit note below.      ENDOCRINOLOGY FOLLOW-UP         HISTORY OF PRESENT ILLNESS    June Ronquillo is seen for follow-up via billable video visit.    1.  Hypogonadism.    Taking testosterone 150 mg IM every 2 weeks: Typically injects on Mondays.    Energy is quite good.  Libido is normal.  No urinary symptoms such as retention or hesitancy.    Has been having some difficulty with getting testosterone and injection supplies consistently due to issues with communication between pharmacy and our clinic.    2.  Thyroid cancer.  No change in the feel of his neck.    Thyroglobulin panel to [facility] 7/19/2024: Thyroglobulin antibody less than 0.4 U/mL, thyroglobulin less than 0.1 nanograms per milliliter.  Concurrent TSH 0.05.     Neck ultrasound performed on 12/5/2023 showed lymph nodes which did not have clearly suspicious features--did not have a chance to complete neck ultrasound prior to this visit.    Last year, PET/CT on 11/15/2023 showed moderately avid uptake in bilateral cervical lymph nodes which otherwise did not have suspicious features: This was favored to be reactive.    3.  Hypothyroidism.  We reduced levothyroxine from 125 to 112 mcg daily in 8/2024.  Continues on this dose.    4.  Bone health.  Takes calcium supplement, thinks 600 mg, once daily in the evening ( from levothyroxine by at least 4 hours).  I had recommended starting vitamin D3 supplement after last visit but Logan has not yet had a chance to do this.    Completed last dose of Zometa on 11/15/2023.    DXA scan performed in 11/2023 showed low bone density.    Pertinent endocrine and related history:  1. Hypothyroidism. Diagnosed in 1/2021, attributed to Hashimoto's thyroiditis.    2. PTC. During imaging for  evaluation of multiple myeloma, he was incidentally discovered to have increased FDG uptake in thyroid nodules in 8/2021 and in 10/2021, with findings as below:  - PET/CT 8/31/2021: Hypermetabolic cervical lymph nodes, 3 separate hypermetabolic foci in the thyroid gland (2 in the left lobe, 1 in the right lobe).  CT appearance of nodules was hypoattenuating with a bulky calcification in one of the left thyroid lobe nodules.  SUV max of thyroid lesions 4.95.  - PET/CT 10/15/2021: Previous mildly enlarged FDG avid right upper cervical lymph nodes were resolved, likely inflammatory.  Few tiny thyroid nodules, similar in size, with decreased (now at most mild) uptake (SUV max 2.5).  -He does not have history of excessive head or neck radiation exposure.  No family history of thyroid disease/thyroid cancer.  -After initial visit in 10/2021, I referred patient for thyroid US which was performed on 10/28/2021. This showed bilateral thyroid nodules and heterogenous thyroid parenchyma.  -He had FNA of left lobe nodule performed on 11/2/2021. Cytology returned consistent with papillary thyroid carcinoma.  -Detailed neck US performed 11/8/2021 showed no suspicious appearing cervical nodes.  -Underwent total thyroidectomy with left selective neck dissection to include levels 6 and 7 on 5/3/2022 with Dr. Sepulveda.  Surgical pathology was as follows:  A.  THYROID GLAND, TOTAL THYROIDECTOMY:  -PAPILLARY THYROID MICROCARCINOMA, conventional/classic type, 0.6 cm in greatest dimension.  -Tumor is located in the mid left lobe, encapsulated, and limited to thyroid gland (no extrathyroidal extension identified).  -Margins are negative for tumor (closest is anterior left lobe surface at 1 mm).  -Benign adenomatoid and colloid nodules up to 0.6 cm in greatest dimension.  -Chronic lymphocytic thyroiditis.  -Biopsy site changes.  -Fragments of parathyroid tissue with normal cellularity (adjacent to mid/lower right lobe).  -One perithyroid  lymph node, negative for malignancy (0/1).  -AJCC pathologic staging is pT1a N0a.  -See comment and synoptic report.     B.  LEFT LEVEL 6 AND 7 NODE, EXCISION:  -Parathyroid gland with normal cellularity.  -No lymph node identified.  -Benign fibroadipose tissue.    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    3. Multiple myeloma. Diagnosed in 4/2021.  He was initiated on treatment with daratumumab, Velcade, Revlimid and dexamethasone.  S/p autologous PBSCT.   4. High risk for diabetes.  5.  Hypogonadism.  His total testosterone has been low, free testosterone at the very low end of normal while gonadotropins have been elevated in a pattern suspicious for compensated hypogonadism, perhaps related to chemotherapy. Unremarkable testicular ultrasound.  -On testosterone since ~ 10/2023.    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    PAST MEDICAL HISTORY  Past Medical History:   Diagnosis Date     CAD (coronary artery disease)     s/p stent to CFX     Cancer (H) 2019     Heart attack (H)      Hyperlipidemia LDL goal <100      Hypertension      Stented coronary artery      Thyroid disease        MEDICATIONS  Current Outpatient Medications   Medication Sig Dispense Refill     albuterol (PROAIR HFA/PROVENTIL HFA/VENTOLIN HFA) 108 (90 Base) MCG/ACT inhaler Inhale 2 puffs into the lungs 4 times daily 18 g 0     aspirin (ASA) 81 MG EC tablet Take 1 tablet (81 mg) by mouth daily       calcium carbonate-vitamin D (OSCAL) 500-5 MG-MCG tablet TAKE ONE TABLET BY MOUTH ONCE DAILY 90 tablet 3     ferrous sulfate (FEROSUL) 325 (65 Fe) MG tablet Take 1 tablet (325 mg) by mouth daily (with breakfast). 30 tablet 2     LENalidomide (REVLIMID) 10 MG CAPS capsule Take 1 capsule (10 mg) by mouth daily 21 capsule 0     levothyroxine (SYNTHROID/LEVOTHROID) 112 MCG tablet Take 1 tablet (112 mcg) by mouth daily. 90 tablet 3     Needle, Disp, (B-D BLUNT FILL  "NEEDLE) 18G X 1-1/2\" MISC For use with testosterone injection: Disposable syringe and needles, use one 18 G needle to draw up medication then switch to 23 G injection needle 25 each 3     Needle, Disp, (BD DISP NEEDLE) 23G X 1\" MISC For use with testosterone injection: Disposable syringe and needles, use one 18 G needle to draw up medication then switch to 23 G injection needle 25 each 3     potassium chloride carson ER (KLOR-CON M10) 10 MEQ CR tablet Take 2 tablets (20 mEq) by mouth daily. 60 tablet 3     rosuvastatin (CRESTOR) 40 MG tablet Take 1 tablet (40 mg) by mouth daily. 90 tablet 4     syringe, disposable, 1 ML MISC For use with testosterone injection: Disposable syringe and 2 needles, use one 18 G needle to draw up medication then switch to 23 G injection needle 25 each 3     testosterone cypionate (DEPOTESTOSTERONE) 200 MG/ML injection Inject 0.75 mLs (150 mg) into the muscle every 14 days. 10 mL 0     vitamin C with B complex (B COMPLEX-C) tablet Take 1 tablet by mouth daily. 90 tablet 3       Allergies, family, and social history were reviewed and documented as needed in EHR.     REVIEW OF SYSTEMS  A focused ROS was performed, with pertinent positives and negatives as noted in the HPI.    PHYSICAL EXAM  BP (!) 147/81 (BP Location: Right arm)   Pulse 81   Wt 72.8 kg (160 lb 9.6 oz)   SpO2 98%   BMI 25.92 kg/m    Body mass index is 25.92 kg/m .  Constitutional: Vital signs reviewed, as recorded above. Patient is alert, oriented and appears in no acute distress.  Eyes: PER, EOMI, no stare, lid lag, or retraction; no conjunctival injection.  ENMT: OP clear with moist MM. Lips are without lesions.   Neck: Neck supple, no palpable thyroid tissue or neck masses.  Lymphatic: No cervical or supraclavicular LAD.  Cardiovascular: RRR, normal S1/S2, no audible murmurs, rubs or gallops; no LE edema.  Respiratory: CTAB, without wheezes, crackles or rhonchi; normal chest wall motion and respiratory effort.  MSK: No " clubbing or cyanosis; normal muscle bulk and tone.  Skin: Normal skin color, temperature, turgor and texture.  Neurological: Alert and oriented times 3. No tremor.      DATA REVIEW  Each of the following laboratory and/or imaging studies were reviewed.              ASSESSMENT  1.  Elevated gonadotropins.  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  Normal testicular ultrasound.  On testosterone, with improvement in hypogonadal symptoms and normal testosterone on previsit labs.  CBC shows anemia, no erythrocytosis.  PSA is in normal range.  Continue current regimen without changes.    2.  Papillary thyroid microcarcinoma.  Status post total thyroidectomy and left selective neck dissection.  T1a NX MX.  Surgical pathology indicates low risk of recurrence/persistent disease based on KRISTEN risk stratification, therefore radioiodine therapy has been deferred to date.  Thyroglobulin panel in 7/2024 showed undetectable thyroglobulin level; no clearly abnormal/suspicious lymphadenopathy on neck ultrasound in 12/2023.  Based on these findings, recommend careful follow-up--have reminded Logan schedule follow-up neck ultrasound.  Continue thyroid hormone suppression as below.    3.  Hypothyroidism.  Diagnosed in 1/2021, preceding thyroidectomy and likely due to Hashimoto's thyroiditis.  Given response to treatment (with low thyroglobulin level and no suspicious nodes on ultrasound) and given low KRISTEN risk status, our TSH goal is between lower limit of normal and 2.  TSH drawn prior to this visit is in ideal range.  Continue levothyroxine.    4.  Multiple myeloma.  Following in oncology.  Status post chemotherapy and autologous PBSCT. On maintenance therapy and monthly Zometa.    5.  Hypocalcemia.  Potentially related to insufficient calcium intake, completed antiresorptive therapy with Zometa in 9/2023.   Would recommend adjusting calcium supplement dose upward and starting vitamin D3.    6.  High risk for diabetes.  A1c in 11/2024 is slightly improved.    7.  Bone health.  DXA scan performed on 11/10/2023 showed low bone density.  FRAX calculated 10-year risk for fracture is 9.8% osteoporotic fracture, 1.2% for hip fracture in the next 10 years.  Has completed course of Zometa in oncology.  Address calcium/vitamin D status as discussed already.    PLAN  -Adjust calcium supplement to 500 mg twice daily with meals, lunch and dinner (calcium needs to be  from levothyroxine by at least 4 hours)  -Start vitamin D3 1000 IU daily  -Otherwise, continue current medications without changes  -Schedule neck ultrasound  (neck ultrasound to be completed at the [facility] [place])  -Check thyroid function tests in 6 months  -Follow-up in 2 year, with labs (midway between testosterone injections) and neck ultrasound performed prior to visit (neck ultrasound to be completed at the [facility] [place])  -We will communicate results via iMeiguhart, or if needed by phone      Orders Placed This Encounter   Procedures     TSH with free T4 reflex     Thyroglobulin and Antibody (Sendout to [facility])     Vitamin D Deficiency     Basic metabolic panel     Albumin level       I spent a total of 54 minutes on the date of encounter reviewing medical records, evaluating the patient, coordinating care and documenting in the EHR, as detailed above.      Makeda Elder MD   Division of Diabetes, Endocrinology and Metabolism  Department of Medicine          Again, thank you for allowing me to participate in the care of your patient.        Sincerely,        Makeda Elder MD

## 2024-12-12 ENCOUNTER — ANCILLARY PROCEDURE (OUTPATIENT)
Dept: ULTRASOUND IMAGING | Facility: CLINIC | Age: 54
End: 2024-12-12
Attending: INTERNAL MEDICINE
Payer: COMMERCIAL

## 2024-12-12 DIAGNOSIS — C73 PAPILLARY MICROCARCINOMA OF THYROID (H): ICD-10-CM

## 2024-12-24 DIAGNOSIS — C90.00 MULTIPLE MYELOMA NOT HAVING ACHIEVED REMISSION (H): ICD-10-CM

## 2024-12-24 RX ORDER — LENALIDOMIDE 10 MG/1
CAPSULE ORAL
Refills: 0 | OUTPATIENT
Start: 2024-12-24

## 2024-12-25 RX ORDER — LENALIDOMIDE 10 MG/1
CAPSULE ORAL
Refills: 0 | OUTPATIENT
Start: 2024-12-25

## 2024-12-26 ENCOUNTER — TELEPHONE (OUTPATIENT)
Dept: ONCOLOGY | Facility: CLINIC | Age: 54
End: 2024-12-26
Payer: COMMERCIAL

## 2024-12-26 NOTE — TELEPHONE ENCOUNTER
Oral Chemotherapy Monitoring Program   Medication: Revlimid  Rx: 10mg PO daily on days 1 through 21 of 28 day cycle   Auth #: [id number]   Risk Category: Adult Male  Routine survey questions reviewed.   Rx to be Escribed to [facility]    Yu Goldberg  [facility]  Oncology Pharmacy Liaison   [email or web address]  [phone] (phone)  [phone] (fax)

## 2025-01-12 ENCOUNTER — APPOINTMENT (OUTPATIENT)
Dept: LAB | Facility: CLINIC | Age: 55
End: 2025-01-12
Attending: REGISTERED NURSE
Payer: COMMERCIAL

## 2025-01-12 ENCOUNTER — INFUSION THERAPY VISIT (OUTPATIENT)
Dept: ONCOLOGY | Facility: CLINIC | Age: 55
End: 2025-01-12
Attending: REGISTERED NURSE
Payer: COMMERCIAL

## 2025-01-12 VITALS
HEART RATE: 96 BPM | OXYGEN SATURATION: 97 % | RESPIRATION RATE: 18 BRPM | WEIGHT: 164 LBS | TEMPERATURE: 98 F | DIASTOLIC BLOOD PRESSURE: 92 MMHG | BODY MASS INDEX: 26.47 KG/M2 | SYSTOLIC BLOOD PRESSURE: 155 MMHG

## 2025-01-12 DIAGNOSIS — C90.00 MULTIPLE MYELOMA NOT HAVING ACHIEVED REMISSION (H): Primary | ICD-10-CM

## 2025-01-12 PROCEDURE — 250N000011 HC RX IP 250 OP 636: Mod: JZ | Performed by: STUDENT IN AN ORGANIZED HEALTH CARE EDUCATION/TRAINING PROGRAM

## 2025-01-12 PROCEDURE — 96401 CHEMO ANTI-NEOPL SQ/IM: CPT

## 2025-01-12 RX ADMIN — DARATUMUMAB AND HYALURONIDASE-FIHJ (HUMAN RECOMBINANT) 1800 MG: 1800; 30000 INJECTION SUBCUTANEOUS at 08:52

## 2025-01-12 ASSESSMENT — PAIN SCALES - GENERAL: PAINLEVEL_OUTOF10: NO PAIN (0)

## 2025-01-12 NOTE — NURSING NOTE
Chief Complaint   Patient presents with    Blood Draw     Labs drawn with  by RN. Vitals taken. Pt checked into next appointment.       Ivana Mena RN

## 2025-01-12 NOTE — PROGRESS NOTES
Infusion Nursing Note:  June Ronquillo presents today for Cycle 39 Day 1 Darzalex Faspro.    Patient spoke with provider today: No    Treatment Conditions:  Lab Results   Component Value Date    HGB 13.5 01/12/2025    WBC 5.1 01/12/2025    ANEU 4.7 03/13/2023    ANEUTAUTO 2.9 01/12/2025     01/12/2025        Lab Results   Component Value Date     01/12/2025    POTASSIUM 3.8 01/12/2025    MAG 2.6 (H) 04/28/2023    CR 1.22 (H) 01/12/2025    HARDEEP 8.8 01/12/2025    BILITOTAL 0.4 01/12/2025    ALBUMIN 4.4 01/12/2025    ALT 18 01/12/2025    AST 31 01/12/2025         Note: Pt with no concerns at visit today. Denies fever/chills, SOB or cough.  Stated he is taking Revlimid at home as prescribed.    Creatinine 1.22 today, encouraged PO fluids at home.     This cycle of Darzalex was delayed due to insurance.  Future appts will need to be adjusted, request sent to care team and scheduling.   Next Darzalex will be due around 2/10/25.   Pt aware to check Mychart for appt details and to call triage with questions/concerns.     Post Infusion Assessment:  Patient tolerated injection to LLQ abdomen without incident.    Discharge Plan:   Patient declined prescription refills.  Discharge instructions reviewed with: Patient.  Patient and/or family verbalized understanding of discharge instructions and all questions answered.  AVS to patient via IntelaHART.   Patient discharged in stable condition accompanied by: self.  Departure Mode: Ambulatory.      Liz Hdez RN

## 2025-01-21 ENCOUNTER — TELEPHONE (OUTPATIENT)
Dept: ONCOLOGY | Facility: CLINIC | Age: 55
End: 2025-01-21
Payer: COMMERCIAL

## 2025-01-21 NOTE — TELEPHONE ENCOUNTER
Oral Chemotherapy Monitoring Program   Medication: Revlimid  Rx: 10mg PO daily on days 1 through 21 of 28 day cycle   Auth #: [id number]   Risk Category: Adult Male  Routine survey questions reviewed.   Rx to be Escribed to [facility]    Yu Goldberg  [facility]  Oncology Pharmacy Liaison   [email or web address]  [phone] (phone)  [phone] (fax)

## 2025-02-10 ENCOUNTER — INFUSION THERAPY VISIT (OUTPATIENT)
Dept: ONCOLOGY | Facility: CLINIC | Age: 55
End: 2025-02-10
Attending: STUDENT IN AN ORGANIZED HEALTH CARE EDUCATION/TRAINING PROGRAM
Payer: COMMERCIAL

## 2025-02-10 ENCOUNTER — ONCOLOGY VISIT (OUTPATIENT)
Dept: ONCOLOGY | Facility: CLINIC | Age: 55
End: 2025-02-10
Attending: REGISTERED NURSE
Payer: COMMERCIAL

## 2025-02-10 DIAGNOSIS — C90.00 MULTIPLE MYELOMA NOT HAVING ACHIEVED REMISSION (H): Primary | ICD-10-CM

## 2025-02-10 LAB
ALBUMIN SERPL BCG-MCNC: 4.6 G/DL (ref 3.5–5.2)
ALP SERPL-CCNC: 73 U/L (ref 40–150)
ALT SERPL W P-5'-P-CCNC: 20 U/L (ref 0–70)
ANION GAP SERPL CALCULATED.3IONS-SCNC: 11 MMOL/L (ref 7–15)
AST SERPL W P-5'-P-CCNC: 30 U/L (ref 0–45)
BASOPHILS # BLD AUTO: 0 10E3/UL (ref 0–0.2)
BASOPHILS NFR BLD AUTO: 1 %
BILIRUB SERPL-MCNC: 0.5 MG/DL
BUN SERPL-MCNC: 13.6 MG/DL (ref 6–20)
CALCIUM SERPL-MCNC: 8.8 MG/DL (ref 8.8–10.4)
CHLORIDE SERPL-SCNC: 104 MMOL/L (ref 98–107)
CREAT SERPL-MCNC: 1.23 MG/DL (ref 0.67–1.17)
EGFRCR SERPLBLD CKD-EPI 2021: 69 ML/MIN/1.73M2
EOSINOPHIL # BLD AUTO: 0.1 10E3/UL (ref 0–0.7)
EOSINOPHIL NFR BLD AUTO: 2 %
ERYTHROCYTE [DISTWIDTH] IN BLOOD BY AUTOMATED COUNT: 17 % (ref 10–15)
GLUCOSE SERPL-MCNC: 100 MG/DL (ref 70–99)
HCO3 SERPL-SCNC: 24 MMOL/L (ref 22–29)
HCT VFR BLD AUTO: 45.4 % (ref 40–53)
HGB BLD-MCNC: 14.5 G/DL (ref 13.3–17.7)
IGA SERPL-MCNC: 64 MG/DL (ref 84–499)
IGG SERPL-MCNC: 589 MG/DL (ref 610–1616)
IGM SERPL-MCNC: 28 MG/DL (ref 35–242)
IMM GRANULOCYTES # BLD: 0 10E3/UL
IMM GRANULOCYTES NFR BLD: 0 %
KAPPA LC FREE SER-MCNC: 0.66 MG/DL (ref 0.33–1.94)
KAPPA LC FREE/LAMBDA FREE SER NEPH: 1.2 {RATIO} (ref 0.26–1.65)
LAMBDA LC FREE SERPL-MCNC: 0.55 MG/DL (ref 0.57–2.63)
LYMPHOCYTES # BLD AUTO: 1.8 10E3/UL (ref 0.8–5.3)
LYMPHOCYTES NFR BLD AUTO: 32 %
MCH RBC QN AUTO: 28.6 PG (ref 26.5–33)
MCHC RBC AUTO-ENTMCNC: 31.9 G/DL (ref 31.5–36.5)
MCV RBC AUTO: 90 FL (ref 78–100)
MONOCYTES # BLD AUTO: 0.4 10E3/UL (ref 0–1.3)
MONOCYTES NFR BLD AUTO: 8 %
NEUTROPHILS # BLD AUTO: 3.2 10E3/UL (ref 1.6–8.3)
NEUTROPHILS NFR BLD AUTO: 57 %
NRBC # BLD AUTO: 0 10E3/UL
NRBC BLD AUTO-RTO: 0 /100
PLATELET # BLD AUTO: 357 10E3/UL (ref 150–450)
POTASSIUM SERPL-SCNC: 4.2 MMOL/L (ref 3.4–5.3)
PROT SERPL-MCNC: 6.8 G/DL (ref 6.4–8.3)
RBC # BLD AUTO: 5.07 10E6/UL (ref 4.4–5.9)
SODIUM SERPL-SCNC: 139 MMOL/L (ref 135–145)
TOTAL PROTEIN SERUM FOR ELP: 6.4 G/DL (ref 6.4–8.3)
WBC # BLD AUTO: 5.6 10E3/UL (ref 4–11)

## 2025-02-10 PROCEDURE — 82784 ASSAY IGA/IGD/IGG/IGM EACH: CPT | Performed by: REGISTERED NURSE

## 2025-02-10 PROCEDURE — 250N000011 HC RX IP 250 OP 636: Mod: JZ | Performed by: STUDENT IN AN ORGANIZED HEALTH CARE EDUCATION/TRAINING PROGRAM

## 2025-02-10 PROCEDURE — G2211 COMPLEX E/M VISIT ADD ON: HCPCS | Performed by: REGISTERED NURSE

## 2025-02-10 PROCEDURE — 84165 PROTEIN E-PHORESIS SERUM: CPT | Mod: 26 | Performed by: PATHOLOGY

## 2025-02-10 PROCEDURE — 99214 OFFICE O/P EST MOD 30 MIN: CPT | Performed by: REGISTERED NURSE

## 2025-02-10 PROCEDURE — 83521 IG LIGHT CHAINS FREE EACH: CPT | Performed by: REGISTERED NURSE

## 2025-02-10 PROCEDURE — 84165 PROTEIN E-PHORESIS SERUM: CPT | Mod: TC | Performed by: PATHOLOGY

## 2025-02-10 PROCEDURE — 96401 CHEMO ANTI-NEOPL SQ/IM: CPT

## 2025-02-10 PROCEDURE — 99213 OFFICE O/P EST LOW 20 MIN: CPT | Mod: 25 | Performed by: REGISTERED NURSE

## 2025-02-10 PROCEDURE — 82310 ASSAY OF CALCIUM: CPT | Performed by: REGISTERED NURSE

## 2025-02-10 PROCEDURE — 36415 COLL VENOUS BLD VENIPUNCTURE: CPT | Performed by: REGISTERED NURSE

## 2025-02-10 PROCEDURE — 84155 ASSAY OF PROTEIN SERUM: CPT | Performed by: REGISTERED NURSE

## 2025-02-10 PROCEDURE — 85048 AUTOMATED LEUKOCYTE COUNT: CPT | Performed by: REGISTERED NURSE

## 2025-02-10 PROCEDURE — 86334 IMMUNOFIX E-PHORESIS SERUM: CPT | Mod: 26 | Performed by: PATHOLOGY

## 2025-02-10 PROCEDURE — 85004 AUTOMATED DIFF WBC COUNT: CPT | Performed by: REGISTERED NURSE

## 2025-02-10 PROCEDURE — 82040 ASSAY OF SERUM ALBUMIN: CPT | Performed by: REGISTERED NURSE

## 2025-02-10 PROCEDURE — 86334 IMMUNOFIX E-PHORESIS SERUM: CPT | Performed by: PATHOLOGY

## 2025-02-10 RX ORDER — DEXAMETHASONE 4 MG/1
12 TABLET ORAL ONCE
Start: 2025-03-28 | End: 2025-03-28

## 2025-02-10 RX ORDER — DIPHENHYDRAMINE HYDROCHLORIDE 50 MG/ML
25 INJECTION INTRAMUSCULAR; INTRAVENOUS
Start: 2025-04-25

## 2025-02-10 RX ORDER — ACETAMINOPHEN 325 MG/1
650 TABLET ORAL ONCE
Start: 2025-05-23 | End: 2025-05-23

## 2025-02-10 RX ORDER — HEPARIN SODIUM (PORCINE) LOCK FLUSH IV SOLN 100 UNIT/ML 100 UNIT/ML
5 SOLUTION INTRAVENOUS
OUTPATIENT
Start: 2025-05-23

## 2025-02-10 RX ORDER — METHYLPREDNISOLONE SODIUM SUCCINATE 40 MG/ML
40 INJECTION INTRAMUSCULAR; INTRAVENOUS
Start: 2025-03-28

## 2025-02-10 RX ORDER — EPINEPHRINE 1 MG/ML
0.3 INJECTION, SOLUTION INTRAMUSCULAR; SUBCUTANEOUS EVERY 5 MIN PRN
OUTPATIENT
Start: 2025-04-25

## 2025-02-10 RX ORDER — ALBUTEROL SULFATE 0.83 MG/ML
2.5 SOLUTION RESPIRATORY (INHALATION)
OUTPATIENT
Start: 2025-04-25

## 2025-02-10 RX ORDER — DIPHENHYDRAMINE HYDROCHLORIDE 50 MG/ML
50 INJECTION INTRAMUSCULAR; INTRAVENOUS
Start: 2025-05-23

## 2025-02-10 RX ORDER — HEPARIN SODIUM,PORCINE 10 UNIT/ML
5 VIAL (ML) INTRAVENOUS
OUTPATIENT
Start: 2025-05-23

## 2025-02-10 RX ORDER — HEPARIN SODIUM (PORCINE) LOCK FLUSH IV SOLN 100 UNIT/ML 100 UNIT/ML
5 SOLUTION INTRAVENOUS
OUTPATIENT
Start: 2025-04-25

## 2025-02-10 RX ORDER — METHYLPREDNISOLONE SODIUM SUCCINATE 40 MG/ML
40 INJECTION INTRAMUSCULAR; INTRAVENOUS
Start: 2025-04-25

## 2025-02-10 RX ORDER — MEPERIDINE HYDROCHLORIDE 25 MG/ML
25 INJECTION INTRAMUSCULAR; INTRAVENOUS; SUBCUTANEOUS
OUTPATIENT
Start: 2025-05-23

## 2025-02-10 RX ORDER — ACETAMINOPHEN 325 MG/1
650 TABLET ORAL ONCE
Start: 2025-04-25 | End: 2025-04-25

## 2025-02-10 RX ORDER — ALBUTEROL SULFATE 0.83 MG/ML
2.5 SOLUTION RESPIRATORY (INHALATION)
OUTPATIENT
Start: 2025-03-28

## 2025-02-10 RX ORDER — DEXAMETHASONE 4 MG/1
12 TABLET ORAL ONCE
Start: 2025-04-25 | End: 2025-04-25

## 2025-02-10 RX ORDER — MEPERIDINE HYDROCHLORIDE 25 MG/ML
25 INJECTION INTRAMUSCULAR; INTRAVENOUS; SUBCUTANEOUS
OUTPATIENT
Start: 2025-03-28

## 2025-02-10 RX ORDER — DIPHENHYDRAMINE HCL 25 MG
50 CAPSULE ORAL ONCE
Start: 2025-04-25 | End: 2025-04-25

## 2025-02-10 RX ORDER — DIPHENHYDRAMINE HYDROCHLORIDE 50 MG/ML
50 INJECTION INTRAMUSCULAR; INTRAVENOUS
Start: 2025-04-25

## 2025-02-10 RX ORDER — EPINEPHRINE 1 MG/ML
0.3 INJECTION, SOLUTION INTRAMUSCULAR; SUBCUTANEOUS EVERY 5 MIN PRN
OUTPATIENT
Start: 2025-05-23

## 2025-02-10 RX ORDER — ALBUTEROL SULFATE 90 UG/1
1-2 INHALANT RESPIRATORY (INHALATION)
Start: 2025-04-25

## 2025-02-10 RX ORDER — ALBUTEROL SULFATE 90 UG/1
1-2 INHALANT RESPIRATORY (INHALATION)
Start: 2025-05-23

## 2025-02-10 RX ORDER — DIPHENHYDRAMINE HCL 25 MG
50 CAPSULE ORAL ONCE
Start: 2025-05-23 | End: 2025-05-23

## 2025-02-10 RX ORDER — HEPARIN SODIUM (PORCINE) LOCK FLUSH IV SOLN 100 UNIT/ML 100 UNIT/ML
5 SOLUTION INTRAVENOUS
OUTPATIENT
Start: 2025-03-28

## 2025-02-10 RX ORDER — EPINEPHRINE 1 MG/ML
0.3 INJECTION, SOLUTION INTRAMUSCULAR; SUBCUTANEOUS EVERY 5 MIN PRN
OUTPATIENT
Start: 2025-03-28

## 2025-02-10 RX ORDER — HEPARIN SODIUM,PORCINE 10 UNIT/ML
5 VIAL (ML) INTRAVENOUS
OUTPATIENT
Start: 2025-03-28

## 2025-02-10 RX ORDER — ALBUTEROL SULFATE 90 UG/1
1-2 INHALANT RESPIRATORY (INHALATION)
Start: 2025-03-28

## 2025-02-10 RX ORDER — DIPHENHYDRAMINE HCL 25 MG
50 CAPSULE ORAL ONCE
Start: 2025-03-28 | End: 2025-03-28

## 2025-02-10 RX ORDER — DIPHENHYDRAMINE HYDROCHLORIDE 50 MG/ML
25 INJECTION INTRAMUSCULAR; INTRAVENOUS
Start: 2025-03-28

## 2025-02-10 RX ORDER — HEPARIN SODIUM,PORCINE 10 UNIT/ML
5 VIAL (ML) INTRAVENOUS
OUTPATIENT
Start: 2025-04-25

## 2025-02-10 RX ORDER — MEPERIDINE HYDROCHLORIDE 25 MG/ML
25 INJECTION INTRAMUSCULAR; INTRAVENOUS; SUBCUTANEOUS
OUTPATIENT
Start: 2025-04-25

## 2025-02-10 RX ORDER — ACETAMINOPHEN 325 MG/1
650 TABLET ORAL ONCE
Start: 2025-03-28 | End: 2025-03-28

## 2025-02-10 RX ORDER — DEXAMETHASONE 4 MG/1
12 TABLET ORAL ONCE
Start: 2025-05-23 | End: 2025-05-23

## 2025-02-10 RX ORDER — METHYLPREDNISOLONE SODIUM SUCCINATE 40 MG/ML
40 INJECTION INTRAMUSCULAR; INTRAVENOUS
Start: 2025-05-23

## 2025-02-10 RX ORDER — ALBUTEROL SULFATE 0.83 MG/ML
2.5 SOLUTION RESPIRATORY (INHALATION)
OUTPATIENT
Start: 2025-05-23

## 2025-02-10 RX ORDER — DIPHENHYDRAMINE HYDROCHLORIDE 50 MG/ML
50 INJECTION INTRAMUSCULAR; INTRAVENOUS
Start: 2025-03-28

## 2025-02-10 RX ORDER — DIPHENHYDRAMINE HYDROCHLORIDE 50 MG/ML
25 INJECTION INTRAMUSCULAR; INTRAVENOUS
Start: 2025-05-23

## 2025-02-10 RX ADMIN — DARATUMUMAB AND HYALURONIDASE-FIHJ (HUMAN RECOMBINANT) 1800 MG: 1800; 30000 INJECTION SUBCUTANEOUS at 11:58

## 2025-02-10 NOTE — NURSING NOTE
Chief Complaint   Patient presents with    Oncology Clinic Visit     Multiple myeloma not having achieved remission     Blood Draw     Labs collected from venipuncture by RN. Vitals taken. Checked in for appointment(s).      Labs collected from venipuncture by RN. Vitals taken. Checked in for appointment(s).     Niurka Leon RN

## 2025-02-10 NOTE — PROGRESS NOTES
Infusion Nursing Note:  June Ronquillo presents today for C40 D1 Darzalex Faspro.    Patient seen by provider today: Yes: Laura Ballesteros   present during visit today: Not Applicable.    Note: Patient presents to the infusion center feeling well today. Does not offer any additional questions or concerns following provider visit today. Patient wishes to proceed with treatment today.     Reports taking vitamin D and Revlimid as prescribed.       Intravenous Access:  No Intravenous access at this visit.    Treatment Conditions:  Lab Results   Component Value Date    HGB 14.5 02/10/2025    WBC 5.6 02/10/2025    ANEU 4.7 03/13/2023    ANEUTAUTO 3.2 02/10/2025     02/10/2025        Lab Results   Component Value Date     02/10/2025    POTASSIUM 4.2 02/10/2025    MAG 2.6 (H) 04/28/2023    CR 1.23 (H) 02/10/2025    HARDEEP 8.8 02/10/2025    BILITOTAL 0.5 02/10/2025    ALBUMIN 4.6 02/10/2025    ALT 20 02/10/2025    AST 30 02/10/2025       Not Applicable.      Post Infusion Assessment:  Patient tolerated Darzalex Faspro injection to the LLQ of the abdomen without incident.       Discharge Plan:   Patient declined prescription refills.  Discharge instructions reviewed with: Patient.  Patient and/or family verbalized understanding of discharge instructions and all questions answered.  AVS to patient via SOASTAT.  Patient will return 03/07/2025 for next appointment.   Patient discharged in stable condition accompanied by: self.  Departure Mode: Ambulatory.      Dasha Allen RN   Nutrition, metabolism, and development symptoms

## 2025-02-10 NOTE — LETTER
2/10/2025      June Ronquillo  [address]  [facility] [address]      Dear Colleague,    Thank you for referring your patient, June Ronquillo, to the [facility]. Please see a copy of my visit note below.        [facility]  Date of visit: 02/10/2025        Oncologic History  - 4/30/2021 M spike of 34.8 in urine.M spike in blood 0.2; IgG 702 with depressed IgA and IgM. Beta 2 microglobulin 2.7. Lambda  with K/L ratio of 0.01. WBC 11.1 with absolute eos of 1.0. hgb, plt, Cr (1.1), and albumin WNL.    -4/30/2021 CT abd/pelvis showed sclerotic marrow changes with numerous lytic appearing lesions throughout the imaged portions of the spine, pelvis and ribs.      -PET scan 5/11/2021 Numerous osteolytic lesions which predominantly demonstrate uptake below liver background levels. There is one intraosseous lesion in the left lateral 9th rib that demonstrates uptake above background, possibly due to nondisplaced fracture. Adjacent to this lesion is mild hypermetabolism to the left pleura, with new small left pleural effusion, suspicious for malignant effusion versus posttraumatic effusion. Pleural uptake may represent extramedullary myeloma extending along the intercostal space versus inflammation.    - BM bx 5/17/2021 with flow showing 4.0% plasma cells which express CD19 (dim), CD38 (bright), CD45 (dim), , and monotypic cytoplasmic lambda immunoglobulin light chains but lack CD20 and CD56.  Hypercellular bone marrow for age (approximately 75% cellularity) with adequate trilineage hematopoiesis. Plasma cell infiltrate: Interstitial, lambda monotypic and representing approximately 60% the bone marrow cellularity, by  immunohistochemical stain. Cytpgenetics with 2 related hypodiploid clones. One with loss of Y, monosomy 13 and 14 (5%) and one with translocation of 8p and 14, derivative 16 with long arm replaced by extra copy  1q,monosomy 21. FISH showed gain of 1q, loss of 13 and 14, IGH-MYC fusion t(8:14)    - Started Miracle-RVd 21 day with velcade on days 1,8,15.     -Cycle 2 Miracle-RVd. Dose reduce dex to 80 mg d/t fatigue and stomach upset on dex days. Also having cough with basilar streaking on CXR    - 8/31/2021 BM bx -rare suspicious plasma cells in touch imprint. No increase in plasma cells by morph.    -8/31/2021 PET/CT Diffuse osteolytic lesions throughout the axial and appendicular skeleton. Increased sclerosis since prior exam 5/11/2021 without increased metabolism or size.  Hypermetabolic pretracheal lymph node as well as hypermetabolic level 2 lymph nodes within the right neck. These lymph nodes are likely reactive from autoimmune activation via medical therapy. Hypermetabolic subcentimeter nodules within the thyroid gland    - 11/11/2021 Autologous BMT    - 2/21/22 started maintenance with Revlimid 10 mg and daratumumab monthly; did Revlimid alone for C1 due to low IgG levels, added daratumumab starting C2.  Initially did Revlimid daily, decreased to 21/28 days in Nov 2023 to help minimize side effects    Interval Hx:   Feeling well overall.  Denies recent illness.  Denies bone pain. Energy and appetite are stable. Weight trending up.       ROS: 10 point ROS neg other than the symptoms noted above in the HPI.    Physical Exam:  Constitutional: NAD  Eyes: no scleral icterus  ENT: no oral lesions  Pulm: CTAB  CV: RRR, no m/r/g  GI: bowel sounds present, soft and nontender to palpation  MSK: No edema in the extremities  Neuro: alert, conversant, normal gait  Psych: appropriate mood and affect          Allergies   Allergen Reactions     Blood Transfusion Related (Informational Only) Other (See Comments)     Patient has a history of a clinically significant antibody against RBC antigens.  A delay in compatible RBCs may occur.      Current Outpatient Medications   Medication Sig Dispense Refill     albuterol (PROAIR HFA/PROVENTIL  "HFA/VENTOLIN HFA) 108 (90 Base) MCG/ACT inhaler Inhale 2 puffs into the lungs 4 times daily 18 g 0     aspirin (ASA) 81 MG EC tablet Take 1 tablet (81 mg) by mouth daily       calcium carbonate-vitamin D (OSCAL) 500-5 MG-MCG tablet TAKE ONE TABLET BY MOUTH ONCE DAILY 90 tablet 3     ferrous sulfate (FEROSUL) 325 (65 Fe) MG tablet Take 1 tablet (325 mg) by mouth daily (with breakfast). 30 tablet 2     LENalidomide (REVLIMID) 10 MG CAPS capsule Take 1 capsule (10 mg) by mouth daily Days 1-21 of each cycle 21 capsule 0     levothyroxine (SYNTHROID/LEVOTHROID) 112 MCG tablet Take 1 tablet (112 mcg) by mouth daily. 90 tablet 3     Needle, Disp, (B-D BLUNT FILL NEEDLE) 18G X 1-1/2\" MISC For use with testosterone injection: Disposable syringe and needles, use one 18 G needle to draw up medication then switch to 23 G injection needle 25 each 3     Needle, Disp, (BD DISP NEEDLE) 23G X 1\" MISC For use with testosterone injection: Disposable syringe and needles, use one 18 G needle to draw up medication then switch to 23 G injection needle 25 each 3     potassium chloride carson ER (KLOR-CON M10) 10 MEQ CR tablet Take 2 tablets (20 mEq) by mouth daily. 60 tablet 3     rosuvastatin (CRESTOR) 40 MG tablet Take 1 tablet (40 mg) by mouth daily. 90 tablet 4     syringe, disposable, 1 ML MISC For use with testosterone injection: Disposable syringe and 2 needles, use one 18 G needle to draw up medication then switch to 23 G injection needle 25 each 3     testosterone cypionate (DEPOTESTOSTERONE) 200 MG/ML injection Inject 0.75 mLs (150 mg) into the muscle every 14 days. 10 mL 0     vitamin C with B complex (B COMPLEX-C) tablet Take 1 tablet by mouth daily. 90 tablet 3     No current facility-administered medications for this visit.     Most Recent 3 CBC's:  Recent Labs   Lab Test 02/10/25  1028 01/12/25  0827 12/03/24  1400   WBC 5.6 5.1 4.9   HGB 14.5 13.5 11.9*   MCV 90 86 82    360 382   ANEUTAUTO 3.2 2.9 2.3     Most Recent " 3 BMP's:  Recent Labs   Lab Test 01/12/25  0827 12/03/24  1400 11/26/24  1409    138 137   POTASSIUM 3.8 3.8 3.5   CHLORIDE 106 104 103   CO2 20* 23 24   BUN 15.1 13.0 14.3   CR 1.22* 1.17 1.10   ANIONGAP 13 11 10   HARDEEP 8.8 8.7* 8.4*   * 112* 95   PROTTOTAL 6.5 6.8 6.5   ALBUMIN 4.4 4.4 4.1    Most Recent 3 LFT's:  Recent Labs   Lab Test 01/12/25  0827 12/03/24  1400 11/26/24  1409   AST 31 29 37   ALT 18 21 22   ALKPHOS 71 70 68   BILITOTAL 0.4 0.3 0.3    Most Recent 2 TSH and T4:  Recent Labs   Lab Test 11/08/24  1053 07/19/24  1001 12/19/22  1103 06/17/22  1332   TSH 0.76 0.05*   < > 0.07*   T4  --  1.20  --  1.36    < > = values in this interval not displayed.         Assessment and Plan  Multiple myeloma with high risk cytogenetics  - Miracle-RVD with VGPR, then underwent autologous transplant 11/11/21.   - Given his high risk cytogenetics he started miracle + revlimid maintenance therapy 2/21/22  - Completed 2 years of zometa in Sept 2023. Continue Vit D.   - Revlimid was decreased to 21/28 days in Nov 2023 to try to minimize side effects (dysgeusia, flu-like symptoms on days following daratumumab)  - He is currently tolerating treatment well without side-effects.   - continue clonoseq from blood q 6 mnths (due in March) MRD+ below LOD  - Labs monthly; clinic visits every 3 months    Ppx/ID  - Continue acyclovir daily for viral ppx   - Pneumococcal vaccine given 02/02/24   - Got both Shingrix vaccines 3/4/24 and 5/7/2024  - Hep A vaccine given 5/14/24--to be repeated in 12 weeks  - MMR vaccine 5/14/24  - Received influenza and covid vaccines for 2024    Hypogammaglobulinemia   Start monthly IV Ig if IgG <300  - 11/8/24 IgG 558  - If continues to have repeat URI could consider raising parameters for IVIG    Borderline low Vit D  -11/25/24 level 22  - pt prefers to increase dietary sources rather than do a supplements  - Recheck in June 20 minutes spent on the date of the encounter doing chart  review, review of test results, interpretation of tests, patient visit, and documentation     The longitudinal plan of care for the diagnosis(es)/condition(s) as documented were addressed during this visit. Due to the added complexity in care, I will continue to support June in the subsequent management and with ongoing continuity of care.    PATTY Meza CNP  [facility] [address] [address]  [phone]      Again, thank you for allowing me to participate in the care of your patient.        Sincerely,        PATTY Landaverde CNP    Electronically signed

## 2025-02-10 NOTE — NURSING NOTE
"Oncology Rooming Note    February 10, 2025 10:33 AM   June Ronquillo is a 54 year old male who presents for:    Chief Complaint   Patient presents with    Oncology Clinic Visit     Multiple myeloma not having achieved remission     Blood Draw     Labs collected from venipuncture by RN. Vitals taken. Checked in for appointment(s).      Initial Vitals: There were no vitals taken for this visit. Estimated body mass index is 26.47 kg/m  as calculated from the following:    Height as of 10/9/24: 1.676 m (5' 6\").    Weight as of 1/12/25: 74.4 kg (164 lb). There is no height or weight on file to calculate BSA.  Data Unavailable Comment: Data Unavailable   No LMP for male patient.  Allergies reviewed: Yes  Medications reviewed: Yes    Medications: Medication refills not needed today.  Pharmacy name entered into 3dim:    [facility] [place], [place] - [address]  [facility] - [place], [place] - [address] [facility] [place], [place] [id number]  [facility] [place], [place] - [address] [facility]  [facility] - [place], [place] - [address]    Frailty Screening:   Is the patient here for a new oncology consult visit in cancer care? 2. No      Clinical concerns: Pt reports no new concerns today.       Mili Dawson, EMT     "

## 2025-02-10 NOTE — PROGRESS NOTES
[facility]  Date of visit: 02/10/2025        Oncologic History  - 4/30/2021 M spike of 34.8 in urine.M spike in blood 0.2; IgG 702 with depressed IgA and IgM. Beta 2 microglobulin 2.7. Lambda  with K/L ratio of 0.01. WBC 11.1 with absolute eos of 1.0. hgb, plt, Cr (1.1), and albumin WNL.    -4/30/2021 CT abd/pelvis showed sclerotic marrow changes with numerous lytic appearing lesions throughout the imaged portions of the spine, pelvis and ribs.      -PET scan 5/11/2021 Numerous osteolytic lesions which predominantly demonstrate uptake below liver background levels. There is one intraosseous lesion in the left lateral 9th rib that demonstrates uptake above background, possibly due to nondisplaced fracture. Adjacent to this lesion is mild hypermetabolism to the left pleura, with new small left pleural effusion, suspicious for malignant effusion versus posttraumatic effusion. Pleural uptake may represent extramedullary myeloma extending along the intercostal space versus inflammation.    - BM bx 5/17/2021 with flow showing 4.0% plasma cells which express CD19 (dim), CD38 (bright), CD45 (dim), , and monotypic cytoplasmic lambda immunoglobulin light chains but lack CD20 and CD56.  Hypercellular bone marrow for age (approximately 75% cellularity) with adequate trilineage hematopoiesis. Plasma cell infiltrate: Interstitial, lambda monotypic and representing approximately 60% the bone marrow cellularity, by  immunohistochemical stain. Cytpgenetics with 2 related hypodiploid clones. One with loss of Y, monosomy 13 and 14 (5%) and one with translocation of 8p and 14, derivative 16 with long arm replaced by extra copy 1q,monosomy 21. FISH showed gain of 1q, loss of 13 and 14, IGH-MYC fusion t(8:14)    - Started Miracle-RVd 21 day with velcade on days 1,8,15.     -Cycle 2 Miracle-RVd. Dose reduce dex to 80 mg d/t fatigue and stomach upset on dex days. Also having cough with  basilar streaking on CXR    - 8/31/2021 BM bx -rare suspicious plasma cells in touch imprint. No increase in plasma cells by morph.    -8/31/2021 PET/CT Diffuse osteolytic lesions throughout the axial and appendicular skeleton. Increased sclerosis since prior exam 5/11/2021 without increased metabolism or size.  Hypermetabolic pretracheal lymph node as well as hypermetabolic level 2 lymph nodes within the right neck. These lymph nodes are likely reactive from autoimmune activation via medical therapy. Hypermetabolic subcentimeter nodules within the thyroid gland    - 11/11/2021 Autologous BMT    - 2/21/22 started maintenance with Revlimid 10 mg and daratumumab monthly; did Revlimid alone for C1 due to low IgG levels, added daratumumab starting C2.  Initially did Revlimid daily, decreased to 21/28 days in Nov 2023 to help minimize side effects    Interval Hx:   Feeling well overall.  Denies recent illness.  Denies bone pain. Energy and appetite are stable. Weight trending up.       ROS: 10 point ROS neg other than the symptoms noted above in the HPI.    Physical Exam:  Constitutional: NAD  Eyes: no scleral icterus  ENT: no oral lesions  Pulm: CTAB  CV: RRR, no m/r/g  GI: bowel sounds present, soft and nontender to palpation  MSK: No edema in the extremities  Neuro: alert, conversant, normal gait  Psych: appropriate mood and affect          Allergies   Allergen Reactions    Blood Transfusion Related (Informational Only) Other (See Comments)     Patient has a history of a clinically significant antibody against RBC antigens.  A delay in compatible RBCs may occur.      Current Outpatient Medications   Medication Sig Dispense Refill    albuterol (PROAIR HFA/PROVENTIL HFA/VENTOLIN HFA) 108 (90 Base) MCG/ACT inhaler Inhale 2 puffs into the lungs 4 times daily 18 g 0    aspirin (ASA) 81 MG EC tablet Take 1 tablet (81 mg) by mouth daily      calcium carbonate-vitamin D (OSCAL) 500-5 MG-MCG tablet TAKE ONE TABLET BY MOUTH  "ONCE DAILY 90 tablet 3    ferrous sulfate (FEROSUL) 325 (65 Fe) MG tablet Take 1 tablet (325 mg) by mouth daily (with breakfast). 30 tablet 2    LENalidomide (REVLIMID) 10 MG CAPS capsule Take 1 capsule (10 mg) by mouth daily Days 1-21 of each cycle 21 capsule 0    levothyroxine (SYNTHROID/LEVOTHROID) 112 MCG tablet Take 1 tablet (112 mcg) by mouth daily. 90 tablet 3    Needle, Disp, (B-D BLUNT FILL NEEDLE) 18G X 1-1/2\" MISC For use with testosterone injection: Disposable syringe and needles, use one 18 G needle to draw up medication then switch to 23 G injection needle 25 each 3    Needle, Disp, (BD DISP NEEDLE) 23G X 1\" MISC For use with testosterone injection: Disposable syringe and needles, use one 18 G needle to draw up medication then switch to 23 G injection needle 25 each 3    potassium chloride carson ER (KLOR-CON M10) 10 MEQ CR tablet Take 2 tablets (20 mEq) by mouth daily. 60 tablet 3    rosuvastatin (CRESTOR) 40 MG tablet Take 1 tablet (40 mg) by mouth daily. 90 tablet 4    syringe, disposable, 1 ML MISC For use with testosterone injection: Disposable syringe and 2 needles, use one 18 G needle to draw up medication then switch to 23 G injection needle 25 each 3    testosterone cypionate (DEPOTESTOSTERONE) 200 MG/ML injection Inject 0.75 mLs (150 mg) into the muscle every 14 days. 10 mL 0    vitamin C with B complex (B COMPLEX-C) tablet Take 1 tablet by mouth daily. 90 tablet 3     No current facility-administered medications for this visit.     Most Recent 3 CBC's:  Recent Labs   Lab Test 02/10/25  1028 01/12/25  0827 12/03/24  1400   WBC 5.6 5.1 4.9   HGB 14.5 13.5 11.9*   MCV 90 86 82    360 382   ANEUTAUTO 3.2 2.9 2.3     Most Recent 3 BMP's:  Recent Labs   Lab Test 01/12/25  0827 12/03/24  1400 11/26/24  1409    138 137   POTASSIUM 3.8 3.8 3.5   CHLORIDE 106 104 103   CO2 20* 23 24   BUN 15.1 13.0 14.3   CR 1.22* 1.17 1.10   ANIONGAP 13 11 10   HARDEEP 8.8 8.7* 8.4*   * 112* 95 "   PROTTOTAL 6.5 6.8 6.5   ALBUMIN 4.4 4.4 4.1    Most Recent 3 LFT's:  Recent Labs   Lab Test 01/12/25  0827 12/03/24  1400 11/26/24  1409   AST 31 29 37   ALT 18 21 22   ALKPHOS 71 70 68   BILITOTAL 0.4 0.3 0.3    Most Recent 2 TSH and T4:  Recent Labs   Lab Test 11/08/24  1053 07/19/24  1001 12/19/22  1103 06/17/22  1332   TSH 0.76 0.05*   < > 0.07*   T4  --  1.20  --  1.36    < > = values in this interval not displayed.         Assessment and Plan  Multiple myeloma with high risk cytogenetics  - Miracle-RVD with VGPR, then underwent autologous transplant 11/11/21.   - Given his high risk cytogenetics he started miracle + revlimid maintenance therapy 2/21/22  - Completed 2 years of zometa in Sept 2023. Continue Vit D.   - Revlimid was decreased to 21/28 days in Nov 2023 to try to minimize side effects (dysgeusia, flu-like symptoms on days following daratumumab)  - He is currently tolerating treatment well without side-effects.   - continue clonoseq from blood q 6 mnths (due in March) MRD+ below LOD  - Labs monthly; clinic visits every 3 months    Ppx/ID  - Continue acyclovir daily for viral ppx   - Pneumococcal vaccine given 02/02/24   - Got both Shingrix vaccines 3/4/24 and 5/7/2024  - Hep A vaccine given 5/14/24--to be repeated in 12 weeks  - MMR vaccine 5/14/24  - Received influenza and covid vaccines for 2024    Hypogammaglobulinemia   Start monthly IV Ig if IgG <300  - 11/8/24 IgG 558  - If continues to have repeat URI could consider raising parameters for IVIG    Borderline low Vit D  -11/25/24 level 22  - pt prefers to increase dietary sources rather than do a supplements  - Recheck in June 20 minutes spent on the date of the encounter doing chart review, review of test results, interpretation of tests, patient visit, and documentation     The longitudinal plan of care for the diagnosis(es)/condition(s) as documented were addressed during this visit. Due to the added complexity in care, I will continue to  support June in the subsequent management and with ongoing continuity of care.    PATTY Meza [facility] [place] [id number] [place], [place] [address] [phone]

## 2025-02-10 NOTE — PATIENT INSTRUCTIONS
[facility] Triage and after hours / weekends / holidays:  [phone]    Please call the triage or after hours line if you experience a temperature greater than or equal to 100.4, shaking chills, have uncontrolled nausea, vomiting and/or diarrhea, dizziness, shortness of breath, chest pain, bleeding, unexplained bruising, or if you have any other new/concerning symptoms, questions or concerns.      If you are having any concerning symptoms or wish to speak to a provider before your next infusion visit, please call triage to notify them so we can adequately serve you.     If you need a refill on a narcotic prescription or other medication, please call before your infusion appointment.                February 2025 Sunday Monday Tuesday Wednesday Thursday Friday Saturday                                 1       2     3     4     5     6     7     8       9     10    LAB PERIPHERAL   9:30 AM   (15 min.)   [facility] DRAW   [facility]    RETURN CCSL   9:45 AM   (45 min.)   Laura Ballesteros APRN CNP   [facility]    ONC INFUSION 1 HR (60 MIN)   3:00 PM   (60 min.)    ONC INFUSION NURSE   [facility] 11     12     13     14     15       16     17     18     19     20     21     22       23     24     25     26 27 28 March 2025 Sunday Monday Tuesday Wednesday Thursday Friday Saturday                                 1       2     3     4     5     6     7    LAB PERIPHERAL   2:30 PM   (15 min.)   [facility]RAW   [facility]    ONC INFUSION 1 HR (60 MIN)   3:00 PM   (60 min.)    ONC INFUSION NURSE   [facility] 8       9     10     11     12     13     14     15       16     17     18     19     20     21     22       23     24     25     26     27     28     29       30     31                                             Recent Results  (from the past 24 hours)   Comprehensive metabolic panel    Collection Time: 02/10/25 10:28 AM   Result Value Ref Range    Sodium 139 135 - 145 mmol/L    Potassium 4.2 3.4 - 5.3 mmol/L    Carbon Dioxide (CO2) 24 22 - 29 mmol/L    Anion Gap 11 7 - 15 mmol/L    Urea Nitrogen 13.6 6.0 - 20.0 mg/dL    Creatinine 1.23 (H) 0.67 - 1.17 mg/dL    GFR Estimate 69 >60 mL/min/1.73m2    Calcium 8.8 8.8 - 10.4 mg/dL    Chloride 104 98 - 107 mmol/L    Glucose 100 (H) 70 - 99 mg/dL    Alkaline Phosphatase 73 40 - 150 U/L    AST 30 0 - 45 U/L    ALT 20 0 - 70 U/L    Protein Total 6.8 6.4 - 8.3 g/dL    Albumin 4.6 3.5 - 5.2 g/dL    Bilirubin Total 0.5 <=1.2 mg/dL   Total Protein, Serum for ELP    Collection Time: 02/10/25 10:28 AM   Result Value Ref Range    Total Protein Serum for ELP 6.4 6.4 - 8.3 g/dL   CBC with platelets and differential    Collection Time: 02/10/25 10:28 AM   Result Value Ref Range    WBC Count 5.6 4.0 - 11.0 10e3/uL    RBC Count 5.07 4.40 - 5.90 10e6/uL    Hemoglobin 14.5 13.3 - 17.7 g/dL    Hematocrit 45.4 40.0 - 53.0 %    MCV 90 78 - 100 fL    MCH 28.6 26.5 - 33.0 pg    MCHC 31.9 31.5 - 36.5 g/dL    RDW 17.0 (H) 10.0 - 15.0 %    Platelet Count 357 150 - 450 10e3/uL    % Neutrophils 57 %    % Lymphocytes 32 %    % Monocytes 8 %    % Eosinophils 2 %    % Basophils 1 %    % Immature Granulocytes 0 %    NRBCs per 100 WBC 0 <1 /100    Absolute Neutrophils 3.2 1.6 - 8.3 10e3/uL    Absolute Lymphocytes 1.8 0.8 - 5.3 10e3/uL    Absolute Monocytes 0.4 0.0 - 1.3 10e3/uL    Absolute Eosinophils 0.1 0.0 - 0.7 10e3/uL    Absolute Basophils 0.0 0.0 - 0.2 10e3/uL    Absolute Immature Granulocytes 0.0 <=0.4 10e3/uL    Absolute NRBCs 0.0 10e3/uL

## 2025-02-11 LAB
ALBUMIN SERPL ELPH-MCNC: 4.4 G/DL (ref 3.7–5.1)
ALPHA1 GLOB SERPL ELPH-MCNC: 0.3 G/DL (ref 0.2–0.4)
ALPHA2 GLOB SERPL ELPH-MCNC: 0.5 G/DL (ref 0.5–0.9)
B-GLOBULIN SERPL ELPH-MCNC: 0.7 G/DL (ref 0.6–1)
GAMMA GLOB SERPL ELPH-MCNC: 0.5 G/DL (ref 0.7–1.6)
M PROTEIN SERPL ELPH-MCNC: 0.1 G/DL
PROT PATTERN SERPL ELPH-IMP: ABNORMAL
PROT PATTERN SERPL IFE-IMP: NORMAL

## 2025-02-13 DIAGNOSIS — C90.00 MULTIPLE MYELOMA NOT HAVING ACHIEVED REMISSION (H): Primary | ICD-10-CM

## 2025-02-13 RX ORDER — LENALIDOMIDE 10 MG/1
10 CAPSULE ORAL DAILY
Qty: 21 CAPSULE | Refills: 0 | OUTPATIENT
Start: 2025-03-07

## 2025-02-15 DIAGNOSIS — C90.00 MULTIPLE MYELOMA NOT HAVING ACHIEVED REMISSION (H): ICD-10-CM

## 2025-02-17 RX ORDER — POTASSIUM CHLORIDE 750 MG/1
20 TABLET, EXTENDED RELEASE ORAL DAILY
Qty: 60 TABLET | Refills: 3 | Status: SHIPPED | OUTPATIENT
Start: 2025-02-17

## 2025-02-17 NOTE — TELEPHONE ENCOUNTER
Medication:potassium  Last prescribing provider:Laura BRAMBILA  Last clinic visit date: 2/10/2025 Laura BRAMBILA  Recommendations for requested medication:supplement  Last lab value on 2/10/2025 was K 4.2  Any other pertinent information:routed to last provider Laura

## 2025-02-20 ENCOUNTER — TELEPHONE (OUTPATIENT)
Dept: ONCOLOGY | Facility: CLINIC | Age: 55
End: 2025-02-20
Payer: COMMERCIAL

## 2025-02-20 NOTE — TELEPHONE ENCOUNTER
Oral Chemotherapy Monitoring Program   Medication: Revlimid  Rx: 10 mg PO daily on days 1 through 21 of 28 day cycle   Auth #: [id number]   Risk Category: Adult Male  Routine survey questions reviewed.   Rx [facility] Escribed to [facility]    Yu Goldberg  [facility]  Oncology Pharmacy Liaison   [email or web address]  [phone] (phone)  [phone] (fax)

## 2025-03-08 ENCOUNTER — APPOINTMENT (OUTPATIENT)
Dept: LAB | Facility: CLINIC | Age: 55
End: 2025-03-08
Payer: COMMERCIAL

## 2025-03-08 ENCOUNTER — INFUSION THERAPY VISIT (OUTPATIENT)
Dept: ONCOLOGY | Facility: CLINIC | Age: 55
End: 2025-03-08
Payer: COMMERCIAL

## 2025-03-08 VITALS
DIASTOLIC BLOOD PRESSURE: 90 MMHG | RESPIRATION RATE: 16 BRPM | WEIGHT: 161.5 LBS | BODY MASS INDEX: 26.07 KG/M2 | SYSTOLIC BLOOD PRESSURE: 153 MMHG | OXYGEN SATURATION: 99 % | TEMPERATURE: 98 F | HEART RATE: 66 BPM

## 2025-03-08 DIAGNOSIS — C90.00 MULTIPLE MYELOMA NOT HAVING ACHIEVED REMISSION (H): Primary | ICD-10-CM

## 2025-03-08 LAB
ALBUMIN SERPL BCG-MCNC: 4.2 G/DL (ref 3.5–5.2)
ALP SERPL-CCNC: 63 U/L (ref 40–150)
ALT SERPL W P-5'-P-CCNC: 19 U/L (ref 0–70)
ANION GAP SERPL CALCULATED.3IONS-SCNC: 10 MMOL/L (ref 7–15)
AST SERPL W P-5'-P-CCNC: 33 U/L (ref 0–45)
BASOPHILS # BLD AUTO: 0 10E3/UL (ref 0–0.2)
BASOPHILS NFR BLD AUTO: 1 %
BILIRUB SERPL-MCNC: 0.5 MG/DL
BUN SERPL-MCNC: 16 MG/DL (ref 6–20)
CALCIUM SERPL-MCNC: 8.6 MG/DL (ref 8.8–10.4)
CHLORIDE SERPL-SCNC: 105 MMOL/L (ref 98–107)
CREAT SERPL-MCNC: 1.12 MG/DL (ref 0.67–1.17)
EGFRCR SERPLBLD CKD-EPI 2021: 77 ML/MIN/1.73M2
EOSINOPHIL # BLD AUTO: 0.1 10E3/UL (ref 0–0.7)
EOSINOPHIL NFR BLD AUTO: 2 %
ERYTHROCYTE [DISTWIDTH] IN BLOOD BY AUTOMATED COUNT: 16.5 % (ref 10–15)
GLUCOSE SERPL-MCNC: 100 MG/DL (ref 70–99)
HCO3 SERPL-SCNC: 21 MMOL/L (ref 22–29)
HCT VFR BLD AUTO: 40.6 % (ref 40–53)
HGB BLD-MCNC: 13.8 G/DL (ref 13.3–17.7)
IMM GRANULOCYTES # BLD: 0 10E3/UL
IMM GRANULOCYTES NFR BLD: 0 %
LYMPHOCYTES # BLD AUTO: 1.7 10E3/UL (ref 0.8–5.3)
LYMPHOCYTES NFR BLD AUTO: 40 %
MCH RBC QN AUTO: 30.3 PG (ref 26.5–33)
MCHC RBC AUTO-ENTMCNC: 34 G/DL (ref 31.5–36.5)
MCV RBC AUTO: 89 FL (ref 78–100)
MONOCYTES # BLD AUTO: 0.6 10E3/UL (ref 0–1.3)
MONOCYTES NFR BLD AUTO: 14 %
NEUTROPHILS # BLD AUTO: 1.9 10E3/UL (ref 1.6–8.3)
NEUTROPHILS NFR BLD AUTO: 44 %
NRBC # BLD AUTO: 0 10E3/UL
NRBC BLD AUTO-RTO: 0 /100
PLATELET # BLD AUTO: 363 10E3/UL (ref 150–450)
POTASSIUM SERPL-SCNC: 4.1 MMOL/L (ref 3.4–5.3)
PROT SERPL-MCNC: 6.2 G/DL (ref 6.4–8.3)
RBC # BLD AUTO: 4.55 10E6/UL (ref 4.4–5.9)
SODIUM SERPL-SCNC: 136 MMOL/L (ref 135–145)
TOTAL PROTEIN SERUM FOR ELP: 6 G/DL (ref 6.4–8.3)
WBC # BLD AUTO: 4.3 10E3/UL (ref 4–11)

## 2025-03-08 PROCEDURE — 85025 COMPLETE CBC W/AUTO DIFF WBC: CPT

## 2025-03-08 PROCEDURE — 86334 IMMUNOFIX E-PHORESIS SERUM: CPT | Mod: 26 | Performed by: PATHOLOGY

## 2025-03-08 PROCEDURE — 36415 COLL VENOUS BLD VENIPUNCTURE: CPT

## 2025-03-08 PROCEDURE — 84165 PROTEIN E-PHORESIS SERUM: CPT | Mod: 26 | Performed by: PATHOLOGY

## 2025-03-08 PROCEDURE — 83521 IG LIGHT CHAINS FREE EACH: CPT

## 2025-03-08 PROCEDURE — 250N000011 HC RX IP 250 OP 636: Mod: JZ | Performed by: STUDENT IN AN ORGANIZED HEALTH CARE EDUCATION/TRAINING PROGRAM

## 2025-03-08 PROCEDURE — 96401 CHEMO ANTI-NEOPL SQ/IM: CPT

## 2025-03-08 PROCEDURE — 84165 PROTEIN E-PHORESIS SERUM: CPT | Mod: TC | Performed by: PATHOLOGY

## 2025-03-08 PROCEDURE — 86334 IMMUNOFIX E-PHORESIS SERUM: CPT | Performed by: PATHOLOGY

## 2025-03-08 PROCEDURE — 84155 ASSAY OF PROTEIN SERUM: CPT

## 2025-03-08 PROCEDURE — 80053 COMPREHEN METABOLIC PANEL: CPT

## 2025-03-08 PROCEDURE — 82040 ASSAY OF SERUM ALBUMIN: CPT

## 2025-03-08 PROCEDURE — 82784 ASSAY IGA/IGD/IGG/IGM EACH: CPT

## 2025-03-08 RX ADMIN — DARATUMUMAB AND HYALURONIDASE-FIHJ (HUMAN RECOMBINANT) 1800 MG: 1800; 30000 INJECTION SUBCUTANEOUS at 10:58

## 2025-03-08 ASSESSMENT — PAIN SCALES - GENERAL: PAINLEVEL_OUTOF10: NO PAIN (0)

## 2025-03-08 NOTE — PROGRESS NOTES
Infusion Nursing Note:  June Ronquillo presents today for Cycle 41, Day 1- Darzalex Faspro Injection.    Patient seen by provider today: No   present during visit today: Not Applicable.    Note: Patient reports feeling well on arrival to infusion suite. Denies the following signs of infection: no fever, cough, chills, rash, chest pain, shortness of breath, diarrhea, or urinary concerns. Denies fatigue, signs of bleeding, and pain. No GI symptoms, in fact reports sense of taste is returning. Reports neuropathy is also improving. No new concerns or questions. Consents to treatment today.     Reports taking Revlimid as ordered, currently on day 7 of 21      Intravenous Access:  No Intravenous access at this visit.    Treatment Conditions:  Note in treatment plan stating no parameters for Daratumumab.      Post Infusion Assessment:  Patient tolerated Darzalex Faspro injection to the right lower abdomen without incident.       Discharge Plan:   Discharge instructions reviewed with: Patient.  Patient and/or family verbalized understanding of discharge instructions and all questions answered.  AVS to patient via [employer].  Patient will return 4/9/25 for next appointment.   Patient discharged in stable condition accompanied by: self.  Departure Mode: Ambulatory.      Kendal Lees RN

## 2025-03-08 NOTE — PATIENT INSTRUCTIONS
[facility] Triage and after hours / weekends / holidays:  [phone]    Please call the triage or after hours line if you experience a temperature greater than or equal to 100.4, shaking chills, have uncontrolled nausea, vomiting and/or diarrhea, dizziness, shortness of breath, chest pain, bleeding, unexplained bruising, or if you have any other new/concerning symptoms, questions or concerns.      If you are having any concerning symptoms or wish to speak to a provider before your next infusion visit, please call triage to notify them so we can adequately serve you.     If you need a refill on a narcotic prescription or other medication, please call before your infusion appointment.

## 2025-03-08 NOTE — NURSING NOTE
Chief Complaint   Patient presents with    Blood Draw     Labs drawn via  by RN. VS taken.     Labs collected from venipuncture by RN. Vitals taken. Checked in for appointment(s).     Elo Baumann RN

## 2025-03-10 LAB
ALBUMIN SERPL ELPH-MCNC: 4 G/DL (ref 3.7–5.1)
ALPHA1 GLOB SERPL ELPH-MCNC: 0.3 G/DL (ref 0.2–0.4)
ALPHA2 GLOB SERPL ELPH-MCNC: 0.5 G/DL (ref 0.5–0.9)
B-GLOBULIN SERPL ELPH-MCNC: 0.7 G/DL (ref 0.6–1)
GAMMA GLOB SERPL ELPH-MCNC: 0.6 G/DL (ref 0.7–1.6)
IGA SERPL-MCNC: 70 MG/DL (ref 84–499)
IGG SERPL-MCNC: 568 MG/DL (ref 610–1616)
IGM SERPL-MCNC: 39 MG/DL (ref 35–242)
KAPPA LC FREE SER-MCNC: 0.69 MG/DL (ref 0.33–1.94)
KAPPA LC FREE/LAMBDA FREE SER NEPH: 1.35 {RATIO} (ref 0.26–1.65)
LAMBDA LC FREE SERPL-MCNC: 0.51 MG/DL (ref 0.57–2.63)
LOCATION OF TASK: ABNORMAL
M PROTEIN SERPL ELPH-MCNC: 0.1 G/DL
PROT PATTERN SERPL ELPH-IMP: ABNORMAL

## 2025-03-11 LAB
LOCATION OF TASK: NORMAL
PROT PATTERN SERPL IFE-IMP: NORMAL

## 2025-03-18 ENCOUNTER — TELEPHONE (OUTPATIENT)
Dept: ONCOLOGY | Facility: CLINIC | Age: 55
End: 2025-03-18
Payer: COMMERCIAL

## 2025-03-18 DIAGNOSIS — C90.00 MULTIPLE MYELOMA NOT HAVING ACHIEVED REMISSION (H): Primary | ICD-10-CM

## 2025-03-18 RX ORDER — LENALIDOMIDE 10 MG/1
10 CAPSULE ORAL DAILY
Qty: 21 CAPSULE | Refills: 0 | Status: SHIPPED | OUTPATIENT
Start: 2025-04-09

## 2025-03-18 NOTE — TELEPHONE ENCOUNTER
Oral Chemotherapy Monitoring Program   Medication: Revlimid  Rx: 10mg PO daily on days 1 through 21 of 28 day cycle   Auth #: [id number]   Risk Category: Adult Male  Routine survey questions reviewed.   Rx to be Escribed to [facility]    Yu Goldberg  [facility]  Oncology Pharmacy Liaison   [email or web address]  [phone] (phone)  [phone] (fax)

## 2025-04-04 NOTE — PROGRESS NOTES
SUBJECTIVE:   June Ronquillo is a 48 year old male who presents to clinic today for the following health issues:    Hypertension Follow-up      Outpatient blood pressures are being checked at home.  Results are high.    Low Salt Diet: no added salt      Amount of exercise or physical activity: None    Problems taking medications regularly: No    Medication side effects: Cough and more tired    Diet: regular (no restrictions)      Patient reports that since he started taking Lisinopril he is coughing a lot, no other side effects noted. 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 Denies SOB, vision changes headaches, dizziness and palpitation.        Problem list and histories reviewed & adjusted, as indicated.  Additional history: as documented    Patient Active Problem List   Diagnosis     CARDIOVASCULAR SCREENING; LDL GOAL LESS THAN 100     FH: heart disease     Overweight (BMI 25.0-29.9)     Hyperlipidemia LDL goal <100     Concussion without loss of consciousness, initial encounter     Concussion without loss of consciousness, subsequent encounter     Hypertension     CAD (coronary artery disease)     Past Surgical History:   Procedure Laterality Date     HEART CATH PTCA SINGLE VESSEL  10/10/2004    Stent to X - [facility]        Social History     Tobacco Use     Smoking status: Never Smoker     Smokeless tobacco: Never Used   Substance Use Topics     Alcohol use: Yes     Comment: 1-2/mo or less     Family History   Problem Relation Age of Onset     Hypertension Mother      Hyperlipidemia Mother      Heart Disease Paternal Grandmother      Hyperlipidemia Sister      Hyperlipidemia Sister            Reviewed and updated as needed this visit by clinical staff  Tobacco  Allergies  Meds       Reviewed and updated as needed this visit by Provider    ROS:  Constitutional, HEENT, cardiovascular, pulmonary, gi and  Patient calling with following questions:    Provider: KAELA Zheng    Patient having the following symptoms/issues: Patient stated that she was in this morning and is waiting for Restriction letters for Amauri and Suman to be sent to her livewell. She was hoping to have those completed today is possible. Please call with update.     Please call patient at the following number:  Mobile [phone]     Patient states that it is okay to leave a detailed message.    Patient was informed that the patient would receive a phone call back within 2 business days, unless this request is STAT.    Preferred times to return call: NONE    gu systems are negative, except as otherwise noted.    OBJECTIVE:     BP (!) 146/100   Pulse 86   Temp 97.8  F (36.6  C) (Oral)   Resp 18   Wt 79.4 kg (175 lb)   SpO2 99%   BMI 28.68 kg/m    Body mass index is 28.68 kg/m .   /98   GENERAL: healthy, obese, alert and no distress  EYES: Eyes grossly normal to inspection, PERRL and conjunctivae and sclerae normal  RESP: lungs clear to auscultation - no rales, rhonchi or wheezes  CV: regular rate and rhythm, normal S1 S2, no S3 or S4, no murmur, click or rub, no peripheral edema and peripheral pulses strong  ABD: soft nontender no organ enlargement     ASSESSMENT/PLAN:   (I10) Essential hypertension  (primary encounter diagnosis)  Comment: Blood pressure uncontrolled, discussed with patient different class of medication that can may not cause coughing. Stated that he wants to continue on Lisinopril.     Plan: Start taking Lisinopril 10 mg, once daily -increased from 5 mg     Follow up with a week  To recheck BP norse only visit and if improved follow up with me in 3 months       Patient Instructions       Patient Education     Eating Heart-Healthy Foods  Eating has a big impact on your heart health. In fact, eating healthier can improve several of your heart risks at once. For instance, it helps you manage weight, cholesterol, and blood pressure. Here are ideas to help you make heart-healthy changes without giving up all the foods and flavors you love.  Getting started    Talk with your healthcare provider about eating plans, such as the DASH or Mediterranean diet. You may also be referred to a dietitian.    Change a few things at a time. Give yourself time to get used to a few eating changes before adding more.    Work to create a tasty, healthy eating plan that you can stick to for the rest of your life.    Goals for healthy eating  Below are some tips to improve your eating habits:    Limit saturated fats and trans fats. Saturated fats raise your levels of  cholesterol, so keep these fats to a minimum. They are found in foods such as fatty meats, whole milk, cheese, and palm and coconut oils. Avoid trans fats because they lower good cholesterol as well as raise bad cholesterol. Trans fats are most often found in processed foods.    Reduce sodium (salt) intake. Eating too much salt may increase your blood pressure. Limit your sodium intake to 2,300 milligrams (mg) per day (the amount in 1 teaspoon of salt), or less if your healthcare provider recommends it. Dining out less often and eating fewer processed foods are two great ways to decrease the amount of salt you consume.    Managing calories. A calorie is a unit of energy. Your body burns calories for fuel, but if you eat more calories than your body burns, the extras are stored as fat. Your healthcare provider can help you create a diet plan to manage your calories. This will likely include eating healthier foods as well as exercising regularly. To help you track your progress, keep a diary to record what you eat and how often you exercise.  Choose the right foods  Aim to make these foods staples of your diet. If you have diabetes, you may have different recommendations than what is listed here:    Fruits and vegetables provide plenty of nutrients without a lot of calories. At meals, fill half your plate with these foods. Split the other half of your plate between whole grains and lean protein.    Whole grains are high in fiber and rich in vitamins and nutrients. Good choices include whole-wheat bread, pasta, and brown rice.    Lean proteins give you nutrition with less fat. Good choices include fish, skinless chicken, and beans.    Low-fat or nonfat dairy provides nutrients without a lot of fat. Try low-fat or nonfat milk, cheese, or yogurt.    Healthy fats can be good for you in small amounts. These are unsaturated fats, such as olive oil, nuts, and fish. Try to have at least 2 servings per week of fatty fish, such  as salmon, sardines, mackerel, rainbow trout, and albacore tuna. These contain omega-3 fatty acids, which are good for your heart. Flaxseed is another source of a heart-healthy fat.  More on heart-healthy eating  Read food labels  Healthy eating starts at the grocery store. Be sure to pay attention to food labels on packaged foods. Look for products that are high in fiber and protein, and low in saturated fat, cholesterol, and sodium. Avoid products that contain trans fat. And pay close attention to serving size. For instance, if you plan to eat two servings, double all the numbers on the label.  Prepare food right  A key part of healthy cooking is cutting down on added fat and salt. Look on the internet for lower-fat, lower-sodium recipes. Also, try these tips:    Remove fat from meat and skin from poultry before cooking.    Skim fat from the surface of soups and sauces.    Broil, boil, bake, steam, grill, and microwave food without added fats.    Choose ingredients that spice up your food without adding calories, fat, or sodium. Try these items: horseradish, hot sauce, lemon, mustard, nonfat salad dressings, and vinegar. For salt-free herbs and spices, try basil, cilantro, cinnamon, pepper, and rosemary.  Date Last Reviewed: 10/1/2017    4532-5834 [employer]. [address] [place] [address]. All rights reserved. This information is not intended as a substitute for professional medical care. Always follow your healthcare professional's instructions.           Patient Education     Exercise for a Healthier Heart     Exercise with a friend. When activity is fun, you're more likely to stick with it.   You may wonder how you can improve the health of your heart. If you re thinking about exercise, you re on the right track. You don t need to become an athlete, but you do need a certain amount of brisk exercise to help strengthen your heart. If you have been diagnosed with a heart condition, your  doctor may recommend exercise to help stabilize your condition. To help make exercise a habit, choose safe, fun activities.  Be sure to check with your healthcare provider before starting an exercise program.   Why exercise?  Exercising regularly offers many healthy rewards. It can help you do all of the following:    Improve your blood cholesterol level to help prevent further heart trouble    Lower your blood pressure to help prevent a stroke or heart attack    Control diabetes, or reduce your risk of getting this disease    Improve your heart and lung function    Reach and maintain a healthy weight    Make your muscles stronger and more limber so you can stay active    Prevent falls and fractures by slowing the loss of bone mass (osteoporosis)    Manage stress better    Reduce your blood pressure    Improve your sense of self and your body image  Exercise tips  Ease into your routine. Set small goals. Then build on them.  Exercise on most days. Aim for a total of 150 or more minutes of moderate to  vigorous intensity activity each week. Consider 40 minutes, 3 to 4 times a week. For best results, activity should last for 40 minutes on average. It is OK to work up to the 40 minute period over time. Examples of moderate-intensity activity is walking 1 mile in 15 minutes or 30 to 45 minutes of yard work.  Step up your daily activity level. Along with your exercise program, try being more active throughout the day. Walk instead of drive. Do more household tasks or yard work.  Choose one or more activities you enjoy. Walking is one of the easiest things you can do. You can also try swimming, riding a bike, dancing, or taking an exercise class.  Stop exercising and call your doctor if you:    Have chest pain or feel dizzy or lightheaded    Feel burning, tightness, pressure, or heaviness in your chest, neck, shoulders, back, or arms    Have unusual shortness of breath    Have increased joint or muscle pain    Have  palpitations or an irregular heartbeat   Date Last Reviewed: 5/1/2016 2000-2018 [employer]. [address] [place] [address], [place] [address]. All rights reserved. This information is not intended as a substitute for professional medical care. Always follow your healthcare professional's instructions.           Prasanna Espinoaz DNP Student    I was present with the NP Student during the history and exam.  I discussed the case with the NP Student and agree with the findings as documented in the assessment and plan.     PATTY Stark CNP  [facility]

## 2025-04-09 ENCOUNTER — INFUSION THERAPY VISIT (OUTPATIENT)
Dept: ONCOLOGY | Facility: CLINIC | Age: 55
End: 2025-04-09
Attending: REGISTERED NURSE
Payer: COMMERCIAL

## 2025-04-09 VITALS
HEART RATE: 80 BPM | BODY MASS INDEX: 26.47 KG/M2 | WEIGHT: 164 LBS | SYSTOLIC BLOOD PRESSURE: 175 MMHG | TEMPERATURE: 97.9 F | OXYGEN SATURATION: 99 % | DIASTOLIC BLOOD PRESSURE: 82 MMHG | RESPIRATION RATE: 16 BRPM

## 2025-04-09 VITALS — SYSTOLIC BLOOD PRESSURE: 147 MMHG | DIASTOLIC BLOOD PRESSURE: 89 MMHG

## 2025-04-09 DIAGNOSIS — C90.00 MULTIPLE MYELOMA NOT HAVING ACHIEVED REMISSION (H): Primary | ICD-10-CM

## 2025-04-09 DIAGNOSIS — C90.01 MULTIPLE MYELOMA IN REMISSION (H): ICD-10-CM

## 2025-04-09 LAB
ALBUMIN SERPL BCG-MCNC: 4.4 G/DL (ref 3.5–5.2)
ALP SERPL-CCNC: 61 U/L (ref 40–150)
ALT SERPL W P-5'-P-CCNC: 19 U/L (ref 0–70)
ANION GAP SERPL CALCULATED.3IONS-SCNC: 12 MMOL/L (ref 7–15)
AST SERPL W P-5'-P-CCNC: 29 U/L (ref 0–45)
BASOPHILS # BLD AUTO: 0 10E3/UL (ref 0–0.2)
BASOPHILS NFR BLD AUTO: 1 %
BILIRUB SERPL-MCNC: 0.4 MG/DL
BUN SERPL-MCNC: 14 MG/DL (ref 6–20)
CALCIUM SERPL-MCNC: 8.8 MG/DL (ref 8.8–10.4)
CHLORIDE SERPL-SCNC: 104 MMOL/L (ref 98–107)
CREAT SERPL-MCNC: 1.08 MG/DL (ref 0.67–1.17)
EGFRCR SERPLBLD CKD-EPI 2021: 81 ML/MIN/1.73M2
EOSINOPHIL # BLD AUTO: 0.2 10E3/UL (ref 0–0.7)
EOSINOPHIL NFR BLD AUTO: 3 %
ERYTHROCYTE [DISTWIDTH] IN BLOOD BY AUTOMATED COUNT: 15.9 % (ref 10–15)
GLUCOSE SERPL-MCNC: 121 MG/DL (ref 70–99)
HCO3 SERPL-SCNC: 22 MMOL/L (ref 22–29)
HCT VFR BLD AUTO: 44.9 % (ref 40–53)
HGB BLD-MCNC: 14.9 G/DL (ref 13.3–17.7)
IMM GRANULOCYTES # BLD: 0 10E3/UL
IMM GRANULOCYTES NFR BLD: 0 %
LYMPHOCYTES # BLD AUTO: 1.5 10E3/UL (ref 0.8–5.3)
LYMPHOCYTES NFR BLD AUTO: 25 %
MCH RBC QN AUTO: 30.3 PG (ref 26.5–33)
MCHC RBC AUTO-ENTMCNC: 33.2 G/DL (ref 31.5–36.5)
MCV RBC AUTO: 91 FL (ref 78–100)
MONOCYTES # BLD AUTO: 0.6 10E3/UL (ref 0–1.3)
MONOCYTES NFR BLD AUTO: 10 %
NEUTROPHILS # BLD AUTO: 3.6 10E3/UL (ref 1.6–8.3)
NEUTROPHILS NFR BLD AUTO: 62 %
NRBC # BLD AUTO: 0 10E3/UL
NRBC BLD AUTO-RTO: 0 /100
PLATELET # BLD AUTO: 324 10E3/UL (ref 150–450)
POTASSIUM SERPL-SCNC: 3.6 MMOL/L (ref 3.4–5.3)
PROT SERPL-MCNC: 6.5 G/DL (ref 6.4–8.3)
RBC # BLD AUTO: 4.92 10E6/UL (ref 4.4–5.9)
SODIUM SERPL-SCNC: 138 MMOL/L (ref 135–145)
TOTAL PROTEIN SERUM FOR ELP: 6.5 G/DL (ref 6.4–8.3)
WBC # BLD AUTO: 5.8 10E3/UL (ref 4–11)

## 2025-04-09 PROCEDURE — 36415 COLL VENOUS BLD VENIPUNCTURE: CPT | Performed by: NURSE PRACTITIONER

## 2025-04-09 PROCEDURE — 84155 ASSAY OF PROTEIN SERUM: CPT | Performed by: NURSE PRACTITIONER

## 2025-04-09 PROCEDURE — 96401 CHEMO ANTI-NEOPL SQ/IM: CPT

## 2025-04-09 PROCEDURE — 250N000011 HC RX IP 250 OP 636: Mod: JZ | Performed by: REGISTERED NURSE

## 2025-04-09 PROCEDURE — 85025 COMPLETE CBC W/AUTO DIFF WBC: CPT | Performed by: NURSE PRACTITIONER

## 2025-04-09 PROCEDURE — 99214 OFFICE O/P EST MOD 30 MIN: CPT | Performed by: NURSE PRACTITIONER

## 2025-04-09 PROCEDURE — 99213 OFFICE O/P EST LOW 20 MIN: CPT | Performed by: NURSE PRACTITIONER

## 2025-04-09 PROCEDURE — 82310 ASSAY OF CALCIUM: CPT | Performed by: NURSE PRACTITIONER

## 2025-04-09 PROCEDURE — 83521 IG LIGHT CHAINS FREE EACH: CPT | Performed by: NURSE PRACTITIONER

## 2025-04-09 PROCEDURE — 82784 ASSAY IGA/IGD/IGG/IGM EACH: CPT | Performed by: NURSE PRACTITIONER

## 2025-04-09 PROCEDURE — G2211 COMPLEX E/M VISIT ADD ON: HCPCS | Performed by: NURSE PRACTITIONER

## 2025-04-09 RX ADMIN — DARATUMUMAB AND HYALURONIDASE-FIHJ (HUMAN RECOMBINANT) 1800 MG: 1800; 30000 INJECTION SUBCUTANEOUS at 16:34

## 2025-04-09 ASSESSMENT — PAIN SCALES - GENERAL
PAINLEVEL_OUTOF10: MODERATE PAIN (5)
PAINLEVEL_OUTOF10: MODERATE PAIN (5)

## 2025-04-09 NOTE — PROGRESS NOTES
Infusion Nursing Note:  June Ronquillo presents today for Cycle 42, Day 1 Darzalex Faspro injection.    Patient seen by provider today: Yes: Georgina Cabezas, KELSY   present during visit today: Not Applicable.    Note: N/A.      Treatment Conditions:  Lab Results   Component Value Date    HGB 14.9 04/09/2025    WBC 5.8 04/09/2025    ANEU 4.7 03/13/2023    ANEUTAUTO 3.6 04/09/2025     04/09/2025        Lab Results   Component Value Date     04/09/2025    POTASSIUM 3.6 04/09/2025    MAG 2.6 (H) 04/28/2023    CR 1.08 04/09/2025    HARDEEP 8.8 04/09/2025    BILITOTAL 0.4 04/09/2025    ALBUMIN 4.4 04/09/2025    ALT 19 04/09/2025    AST 29 04/09/2025   Results reviewed, labs MET treatment parameters, ok to proceed with treatment.    Post Infusion Assessment:  Patient tolerated injection to left lower abdomen without incident.     Discharge Plan:   Patient declined prescription refills.  AVS to patient via [employer].  Patient will return 5/5/25 for next appointment.   Patient discharged in stable condition accompanied by: self.  Departure Mode: Ambulatory.      Ema Bowden RN

## 2025-04-09 NOTE — LETTER
4/9/2025      June Ronquillo  [address]  [place] [address]      Dear Colleague,    Thank you for referring your patient, June Ronquillo, to the [facility]. Please see a copy of my visit note below.    [facility]  Return Visit  Date of visit: 04/09/2025        Oncologic History  - 4/30/2021 M spike of 34.8 in urine.M spike in blood 0.2; IgG 702 with depressed IgA and IgM. Beta 2 microglobulin 2.7. Lambda  with K/L ratio of 0.01. WBC 11.1 with absolute eos of 1.0. hgb, plt, Cr (1.1), and albumin WNL.    -4/30/2021 CT abd/pelvis showed sclerotic marrow changes with numerous lytic appearing lesions throughout the imaged portions of the spine, pelvis and ribs.      -PET scan 5/11/2021 Numerous osteolytic lesions which predominantly demonstrate uptake below liver background levels. There is one intraosseous lesion in the left lateral 9th rib that demonstrates uptake above background, possibly due to nondisplaced fracture. Adjacent to this lesion is mild hypermetabolism to the left pleura, with new small left pleural effusion, suspicious for malignant effusion versus posttraumatic effusion. Pleural uptake may represent extramedullary myeloma extending along the intercostal space versus inflammation.    - BM bx 5/17/2021 with flow showing 4.0% plasma cells which express CD19 (dim), CD38 (bright), CD45 (dim), , and monotypic cytoplasmic lambda immunoglobulin light chains but lack CD20 and CD56.  Hypercellular bone marrow for age (approximately 75% cellularity) with adequate trilineage hematopoiesis. Plasma cell infiltrate: Interstitial, lambda monotypic and representing approximately 60% the bone marrow cellularity, by  immunohistochemical stain. Cytpgenetics with 2 related hypodiploid clones. One with loss of Y, monosomy 13 and 14 (5%) and one with translocation of 8p and 14, derivative 16 with long arm replaced by  extra copy 1q,monosomy 21. FISH showed gain of 1q, loss of 13 and 14, IGH-MYC fusion t(8:14)    - Started Miracle-RVd 21 day with velcade on days 1,8,15.     -Cycle 2 Miracle-RVd. Dose reduce dex to 80 mg d/t fatigue and stomach upset on dex days. Also having cough with basilar streaking on CXR    - 8/31/2021 BM bx -rare suspicious plasma cells in touch imprint. No increase in plasma cells by morph.    -8/31/2021 PET/CT Diffuse osteolytic lesions throughout the axial and appendicular skeleton. Increased sclerosis since prior exam 5/11/2021 without increased metabolism or size.  Hypermetabolic pretracheal lymph node as well as hypermetabolic level 2 lymph nodes within the right neck. These lymph nodes are likely reactive from autoimmune activation via medical therapy. Hypermetabolic subcentimeter nodules within the thyroid gland    - 11/11/2021 Autologous BMT    - 2/21/22 started maintenance with Revlimid 10 mg and daratumumab monthly; did Revlimid alone for C1 due to low IgG levels, added daratumumab starting C2.  Initially did Revlimid daily, decreased to 21/28 days in Nov 2023 to help minimize side effects    Interval Hx:   Logan is here today for follow up  He feels well without acute complaints. No recent illness or infection. No pains. Energy and appetite are stable.     Had a slight headache with his high blood pressure -- has been running around most of today. Dozed a bit before our visit and now headache resolved.        ROS: 10 point ROS neg other than the symptoms noted above in the HPI.    Physical Exam:  BP (!) 175/82   Pulse 80   Temp 97.9  F (36.6  C) (Oral)   Resp 16   Wt 74.4 kg (164 lb)   SpO2 99%   BMI 26.47 kg/m    General: No acute distress.  HEENT: EOMI, PERRL. Sclerae are anicteric. Oral mucosa is pink and moist with no lesions or thrush.   Lymph: Neck is supple with no lymphadenopathy in the cervical or supraclavicular areas.   Abdomen: Bowel sounds present, soft, nontender with no palpable  "hepatosplenomegaly or masses.   Extremities: No lower extremity edema noted bilaterally.   Neuro: Alert and oriented x3, CN grossly intact, steady gait  Skin: No rashes, petechiae, or bruising noted on exposed skin.    Wt Readings from Last 4 Encounters:   04/09/25 74.4 kg (164 lb)   03/08/25 73.3 kg (161 lb 8 oz)   01/12/25 74.4 kg (164 lb)   12/11/24 72.8 kg (160 lb 9.6 oz)         Allergies   Allergen Reactions     Blood Transfusion Related (Informational Only) Other (See Comments)     Patient has a history of a clinically significant antibody against RBC antigens.  A delay in compatible RBCs may occur.      Current Outpatient Medications   Medication Sig Dispense Refill     albuterol (PROAIR HFA/PROVENTIL HFA/VENTOLIN HFA) 108 (90 Base) MCG/ACT inhaler Inhale 2 puffs into the lungs 4 times daily 18 g 0     aspirin (ASA) 81 MG EC tablet Take 1 tablet (81 mg) by mouth daily       calcium carbonate-vitamin D (OSCAL) 500-5 MG-MCG tablet TAKE ONE TABLET BY MOUTH ONCE DAILY 90 tablet 3     ferrous sulfate (FEROSUL) 325 (65 Fe) MG tablet Take 1 tablet (325 mg) by mouth daily (with breakfast). 30 tablet 2     LENalidomide (REVLIMID) 10 MG CAPS capsule Take 1 capsule (10 mg) by mouth daily Days 1-21 of each cycle 21 capsule 0     levothyroxine (SYNTHROID/LEVOTHROID) 112 MCG tablet Take 1 tablet (112 mcg) by mouth daily. 90 tablet 3     Needle, Disp, (B-D BLUNT FILL NEEDLE) 18G X 1-1/2\" MISC For use with testosterone injection: Disposable syringe and needles, use one 18 G needle to draw up medication then switch to 23 G injection needle 25 each 3     Needle, Disp, (BD DISP NEEDLE) 23G X 1\" MISC For use with testosterone injection: Disposable syringe and needles, use one 18 G needle to draw up medication then switch to 23 G injection needle 25 each 3     potassium chloride carson ER (KLOR-CON M10) 10 MEQ CR tablet TAKE TWO TABLETS BY MOUTH ONCE DAILY 60 tablet 3     rosuvastatin (CRESTOR) 40 MG tablet Take 1 tablet (40 mg) " by mouth daily. 90 tablet 4     syringe, disposable, 1 ML MISC For use with testosterone injection: Disposable syringe and 2 needles, use one 18 G needle to draw up medication then switch to 23 G injection needle 25 each 3     testosterone cypionate (DEPOTESTOSTERONE) 200 MG/ML injection Inject 0.75 mLs (150 mg) into the muscle every 14 days. 10 mL 0     vitamin C with B complex (B COMPLEX-C) tablet Take 1 tablet by mouth daily. 90 tablet 3     No current facility-administered medications for this visit.     Most Recent 3 CBC's:  Recent Labs   Lab Test 04/09/25  1525 03/08/25  1033 02/10/25  1028   WBC 5.8 4.3 5.6   HGB 14.9 13.8 14.5   MCV 91 89 90    363 357   ANEUTAUTO 3.6 1.9 3.2     Most Recent 3 BMP's:  Recent Labs   Lab Test 04/09/25  1525 03/08/25  1033 02/10/25  1028    136 139   POTASSIUM 3.6 4.1 4.2   CHLORIDE 104 105 104   CO2 22 21* 24   BUN 14.0 16.0 13.6   CR 1.08 1.12 1.23*   ANIONGAP 12 10 11   HARDEEP 8.8 8.6* 8.8   * 100* 100*   PROTTOTAL 6.5 6.2* 6.8   ALBUMIN 4.4 4.2 4.6    Most Recent 3 LFT's:  Recent Labs   Lab Test 04/09/25  1525 03/08/25  1033 02/10/25  1028   AST 29 33 30   ALT 19 19 20   ALKPHOS 61 63 73   BILITOTAL 0.4 0.5 0.5    Most Recent 2 TSH and T4:  Recent Labs   Lab Test 11/08/24  1053 07/19/24  1001 12/19/22  1103 06/17/22  1332   TSH 0.76 0.05*   < > 0.07*   T4  --  1.20  --  1.36    < > = values in this interval not displayed.      Latest Reference Range & Units 12/03/24 14:00 01/12/25 08:27 02/10/25 10:28 03/08/25 10:33   Kappa Free Lt Chain 0.33 - 1.94 mg/dL 0.98 0.75 0.66 0.69   Kappa Lambda Ratio 0.26 - 1.65  1.36 1.36 1.20 1.35   Lambda Free Lt Chain 0.57 - 2.63 mg/dL 0.72 0.55 (L) 0.55 (L) 0.51 (L)   Monoclonal Peak <=0.0 g/dL 0.0 0.0 0.1 (H) 0.1 (H)   Total Protein Serum for ELP 6.4 - 8.3 g/dL 6.6 6.3 (L) 6.4 6.0 (L)   (L): Data is abnormally low  (H): Data is abnormally high          Assessment and Plan--   Multiple myeloma with high risk cytogenetics  -  Miracle-RVD with VGPR, then underwent autologous transplant 11/11/21.   - Given his high risk cytogenetics he started miracle + revlimid maintenance therapy 2/21/22  - Completed 2 years of zometa in Sept 2023. Continue Vit D.   - Revlimid was decreased to 21/28 days in Nov 2023 to try to minimize side effects (dysgeusia, flu-like symptoms on days following daratumumab)  - He is currently tolerating treatment well without side-effects.   - continue clonoseq from blood q 6 mnths (due in March) MRD+ below LOD--   - Labs monthly; clinic visits every 3 months    Ppx/ ID  - Continue acyclovir daily for viral ppx   - Pneumococcal vaccine given 02/02/24   - Got both Shingrix vaccines 3/4/24 and 5/7/2024  - Hep A vaccine given 5/14/24--to be repeated in 12 weeks  - MMR vaccine 5/14/24  - Received influenza and covid vaccines for 2024    Hypogammaglobulinemia   Start monthly IV Ig if IgG <300  - 11/8/24 IgG 558  - If continues to have repeat URI could consider raising parameters for IVIG    Borderline low Vit D  -11/25/24 level 22  - pt prefers to increase dietary sources rather than do a supplements  - Recheck in June    HTN-- BP elevated upon arrival to lab, improved to SBP 140s prior to leaving-- no intervention. Headache resolved.         20 minutes spent on the date of the encounter doing chart review, review of test results, interpretation of tests, patient visit, and documentation     The longitudinal plan of care for the diagnosis(es)/condition(s) as documented were addressed during this visit. Due to the added complexity in care, I will continue to support June in the subsequent management and with ongoing continuity of care.    Georgina Cabezas [facility]        Again, thank you for allowing me to participate in the care of your patient.        Sincerely,        Georgina Cabezas, APRN CNP    Electronically signed

## 2025-04-09 NOTE — PROGRESS NOTES
[facility]  Return Visit  Date of visit: 04/09/2025        Oncologic History  - 4/30/2021 M spike of 34.8 in urine.M spike in blood 0.2; IgG 702 with depressed IgA and IgM. Beta 2 microglobulin 2.7. Lambda  with K/L ratio of 0.01. WBC 11.1 with absolute eos of 1.0. hgb, plt, Cr (1.1), and albumin WNL.    -4/30/2021 CT abd/pelvis showed sclerotic marrow changes with numerous lytic appearing lesions throughout the imaged portions of the spine, pelvis and ribs.      -PET scan 5/11/2021 Numerous osteolytic lesions which predominantly demonstrate uptake below liver background levels. There is one intraosseous lesion in the left lateral 9th rib that demonstrates uptake above background, possibly due to nondisplaced fracture. Adjacent to this lesion is mild hypermetabolism to the left pleura, with new small left pleural effusion, suspicious for malignant effusion versus posttraumatic effusion. Pleural uptake may represent extramedullary myeloma extending along the intercostal space versus inflammation.    - BM bx 5/17/2021 with flow showing 4.0% plasma cells which express CD19 (dim), CD38 (bright), CD45 (dim), , and monotypic cytoplasmic lambda immunoglobulin light chains but lack CD20 and CD56.  Hypercellular bone marrow for age (approximately 75% cellularity) with adequate trilineage hematopoiesis. Plasma cell infiltrate: Interstitial, lambda monotypic and representing approximately 60% the bone marrow cellularity, by  immunohistochemical stain. Cytpgenetics with 2 related hypodiploid clones. One with loss of Y, monosomy 13 and 14 (5%) and one with translocation of 8p and 14, derivative 16 with long arm replaced by extra copy 1q,monosomy 21. FISH showed gain of 1q, loss of 13 and 14, IGH-MYC fusion t(8:14)    - Started Miracle-RVd 21 day with velcade on days 1,8,15.     -Cycle 2 Miracle-RVd. Dose reduce dex to 80 mg d/t fatigue and stomach upset on dex days. Also having  cough with basilar streaking on CXR    - 8/31/2021 BM bx -rare suspicious plasma cells in touch imprint. No increase in plasma cells by morph.    -8/31/2021 PET/CT Diffuse osteolytic lesions throughout the axial and appendicular skeleton. Increased sclerosis since prior exam 5/11/2021 without increased metabolism or size.  Hypermetabolic pretracheal lymph node as well as hypermetabolic level 2 lymph nodes within the right neck. These lymph nodes are likely reactive from autoimmune activation via medical therapy. Hypermetabolic subcentimeter nodules within the thyroid gland    - 11/11/2021 Autologous BMT    - 2/21/22 started maintenance with Revlimid 10 mg and daratumumab monthly; did Revlimid alone for C1 due to low IgG levels, added daratumumab starting C2.  Initially did Revlimid daily, decreased to 21/28 days in Nov 2023 to help minimize side effects    Interval Hx:   Logan is here today for follow up  He feels well without acute complaints. No recent illness or infection. No pains. Energy and appetite are stable.     Had a slight headache with his high blood pressure -- has been running around most of today. Dozed a bit before our visit and now headache resolved.        ROS: 10 point ROS neg other than the symptoms noted above in the HPI.    Physical Exam:  BP (!) 175/82   Pulse 80   Temp 97.9  F (36.6  C) (Oral)   Resp 16   Wt 74.4 kg (164 lb)   SpO2 99%   BMI 26.47 kg/m    General: No acute distress.  HEENT: EOMI, PERRL. Sclerae are anicteric. Oral mucosa is pink and moist with no lesions or thrush.   Lymph: Neck is supple with no lymphadenopathy in the cervical or supraclavicular areas.   Abdomen: Bowel sounds present, soft, nontender with no palpable hepatosplenomegaly or masses.   Extremities: No lower extremity edema noted bilaterally.   Neuro: Alert and oriented x3, CN grossly intact, steady gait  Skin: No rashes, petechiae, or bruising noted on exposed skin.    Wt Readings from Last 4 Encounters:  "  04/09/25 74.4 kg (164 lb)   03/08/25 73.3 kg (161 lb 8 oz)   01/12/25 74.4 kg (164 lb)   12/11/24 72.8 kg (160 lb 9.6 oz)         Allergies   Allergen Reactions    Blood Transfusion Related (Informational Only) Other (See Comments)     Patient has a history of a clinically significant antibody against RBC antigens.  A delay in compatible RBCs may occur.      Current Outpatient Medications   Medication Sig Dispense Refill    albuterol (PROAIR HFA/PROVENTIL HFA/VENTOLIN HFA) 108 (90 Base) MCG/ACT inhaler Inhale 2 puffs into the lungs 4 times daily 18 g 0    aspirin (ASA) 81 MG EC tablet Take 1 tablet (81 mg) by mouth daily      calcium carbonate-vitamin D (OSCAL) 500-5 MG-MCG tablet TAKE ONE TABLET BY MOUTH ONCE DAILY 90 tablet 3    ferrous sulfate (FEROSUL) 325 (65 Fe) MG tablet Take 1 tablet (325 mg) by mouth daily (with breakfast). 30 tablet 2    LENalidomide (REVLIMID) 10 MG CAPS capsule Take 1 capsule (10 mg) by mouth daily Days 1-21 of each cycle 21 capsule 0    levothyroxine (SYNTHROID/LEVOTHROID) 112 MCG tablet Take 1 tablet (112 mcg) by mouth daily. 90 tablet 3    Needle, Disp, (B-D BLUNT FILL NEEDLE) 18G X 1-1/2\" MISC For use with testosterone injection: Disposable syringe and needles, use one 18 G needle to draw up medication then switch to 23 G injection needle 25 each 3    Needle, Disp, (BD DISP NEEDLE) 23G X 1\" MISC For use with testosterone injection: Disposable syringe and needles, use one 18 G needle to draw up medication then switch to 23 G injection needle 25 each 3    potassium chloride carson ER (KLOR-CON M10) 10 MEQ CR tablet TAKE TWO TABLETS BY MOUTH ONCE DAILY 60 tablet 3    rosuvastatin (CRESTOR) 40 MG tablet Take 1 tablet (40 mg) by mouth daily. 90 tablet 4    syringe, disposable, 1 ML MISC For use with testosterone injection: Disposable syringe and 2 needles, use one 18 G needle to draw up medication then switch to 23 G injection needle 25 each 3    testosterone cypionate (DEPOTESTOSTERONE) " 200 MG/ML injection Inject 0.75 mLs (150 mg) into the muscle every 14 days. 10 mL 0    vitamin C with B complex (B COMPLEX-C) tablet Take 1 tablet by mouth daily. 90 tablet 3     No current facility-administered medications for this visit.     Most Recent 3 CBC's:  Recent Labs   Lab Test 04/09/25  1525 03/08/25  1033 02/10/25  1028   WBC 5.8 4.3 5.6   HGB 14.9 13.8 14.5   MCV 91 89 90    363 357   ANEUTAUTO 3.6 1.9 3.2     Most Recent 3 BMP's:  Recent Labs   Lab Test 04/09/25  1525 03/08/25  1033 02/10/25  1028    136 139   POTASSIUM 3.6 4.1 4.2   CHLORIDE 104 105 104   CO2 22 21* 24   BUN 14.0 16.0 13.6   CR 1.08 1.12 1.23*   ANIONGAP 12 10 11   HARDEEP 8.8 8.6* 8.8   * 100* 100*   PROTTOTAL 6.5 6.2* 6.8   ALBUMIN 4.4 4.2 4.6    Most Recent 3 LFT's:  Recent Labs   Lab Test 04/09/25  1525 03/08/25  1033 02/10/25  1028   AST 29 33 30   ALT 19 19 20   ALKPHOS 61 63 73   BILITOTAL 0.4 0.5 0.5    Most Recent 2 TSH and T4:  Recent Labs   Lab Test 11/08/24  1053 07/19/24  1001 12/19/22  1103 06/17/22  1332   TSH 0.76 0.05*   < > 0.07*   T4  --  1.20  --  1.36    < > = values in this interval not displayed.      Latest Reference Range & Units 12/03/24 14:00 01/12/25 08:27 02/10/25 10:28 03/08/25 10:33   Kappa Free Lt Chain 0.33 - 1.94 mg/dL 0.98 0.75 0.66 0.69   Kappa Lambda Ratio 0.26 - 1.65  1.36 1.36 1.20 1.35   Lambda Free Lt Chain 0.57 - 2.63 mg/dL 0.72 0.55 (L) 0.55 (L) 0.51 (L)   Monoclonal Peak <=0.0 g/dL 0.0 0.0 0.1 (H) 0.1 (H)   Total Protein Serum for ELP 6.4 - 8.3 g/dL 6.6 6.3 (L) 6.4 6.0 (L)   (L): Data is abnormally low  (H): Data is abnormally high          Assessment and Plan--   Multiple myeloma with high risk cytogenetics  - Miracle-RVD with VGPR, then underwent autologous transplant 11/11/21.   - Given his high risk cytogenetics he started miracle + revlimid maintenance therapy 2/21/22  - Completed 2 years of zometa in Sept 2023. Continue Vit D.   - Revlimid was decreased to 21/28 days in Nov  2023 to try to minimize side effects (dysgeusia, flu-like symptoms on days following daratumumab)  - He is currently tolerating treatment well without side-effects.   - continue clonoseq from blood q 6 mnths (due in March) MRD+ below LOD--   - Labs monthly; clinic visits every 3 months    Ppx/ ID  - Continue acyclovir daily for viral ppx   - Pneumococcal vaccine given 02/02/24   - Got both Shingrix vaccines 3/4/24 and 5/7/2024  - Hep A vaccine given 5/14/24--to be repeated in 12 weeks  - MMR vaccine 5/14/24  - Received influenza and covid vaccines for 2024    Hypogammaglobulinemia   Start monthly IV Ig if IgG <300  - 11/8/24 IgG 558  - If continues to have repeat URI could consider raising parameters for IVIG    Borderline low Vit D  -11/25/24 level 22  - pt prefers to increase dietary sources rather than do a supplements  - Recheck in June    HTN-- BP elevated upon arrival to lab, improved to SBP 140s prior to leaving-- no intervention. Headache resolved.         20 minutes spent on the date of the encounter doing chart review, review of test results, interpretation of tests, patient visit, and documentation     The longitudinal plan of care for the diagnosis(es)/condition(s) as documented were addressed during this visit. Due to the added complexity in care, I will continue to support June in the subsequent management and with ongoing continuity of care.    Georgina Cabezas [facility]

## 2025-04-09 NOTE — NURSING NOTE
Chief Complaint   Patient presents with    Oncology Clinic Visit     Multiple myeloma not having achieved remission    Blood Draw     Labs drawn with  by rn.  Vs taken.     Labs drawn with  by rn.  Pt tolerated well.  VS taken.  Pt checked in for next appt.    Aubrie Fleming RN

## 2025-04-10 LAB
IGA SERPL-MCNC: 68 MG/DL (ref 84–499)
IGG SERPL-MCNC: 545 MG/DL (ref 610–1616)
IGM SERPL-MCNC: 26 MG/DL (ref 35–242)
KAPPA LC FREE SER-MCNC: 0.72 MG/DL (ref 0.33–1.94)
KAPPA LC FREE/LAMBDA FREE SER NEPH: 1.14 {RATIO} (ref 0.26–1.65)
LAMBDA LC FREE SERPL-MCNC: 0.63 MG/DL (ref 0.57–2.63)

## 2025-04-13 ENCOUNTER — HEALTH MAINTENANCE LETTER (OUTPATIENT)
Age: 55
End: 2025-04-13

## 2025-04-15 ENCOUNTER — TELEPHONE (OUTPATIENT)
Dept: ONCOLOGY | Facility: CLINIC | Age: 55
End: 2025-04-15
Payer: COMMERCIAL

## 2025-04-15 DIAGNOSIS — C90.00 MULTIPLE MYELOMA NOT HAVING ACHIEVED REMISSION (H): Primary | ICD-10-CM

## 2025-04-15 DIAGNOSIS — C90.00 MULTIPLE MYELOMA NOT HAVING ACHIEVED REMISSION (H): ICD-10-CM

## 2025-04-15 RX ORDER — LENALIDOMIDE 10 MG/1
CAPSULE ORAL
Refills: 0 | OUTPATIENT
Start: 2025-04-15

## 2025-04-15 RX ORDER — LENALIDOMIDE 10 MG/1
10 CAPSULE ORAL DAILY
Qty: 21 CAPSULE | Refills: 0 | Status: SHIPPED | OUTPATIENT
Start: 2025-05-05

## 2025-04-15 NOTE — TELEPHONE ENCOUNTER
Oral Chemotherapy Monitoring Program   Medication: Revlimid  Rx: 10mg PO daily on days 1 through 21 of 28 day cycle   Auth #: [id number]   Risk Category: Adult Male  Routine survey questions reviewed.   Rx to be Escribed to [facility] specialty    Yu Goldberg  [facility]  Oncology Pharmacy Liaison   [email or web address]  [phone] (phone)  [phone] (fax)

## 2025-04-23 PROBLEM — R50.81 NEUTROPENIC FEVER: Status: RESOLVED | Noted: 2021-11-20 | Resolved: 2025-04-23

## 2025-04-23 PROBLEM — R68.83 CHILLS: Status: RESOLVED | Noted: 2023-04-19 | Resolved: 2025-04-23

## 2025-04-23 PROBLEM — R50.81 FEBRILE NEUTROPENIA: Status: RESOLVED | Noted: 2021-11-20 | Resolved: 2025-04-23

## 2025-04-23 PROBLEM — D70.9 NEUTROPENIC FEVER: Status: RESOLVED | Noted: 2021-11-20 | Resolved: 2025-04-23

## 2025-04-23 PROBLEM — R10.11 ABDOMINAL PAIN, RIGHT UPPER QUADRANT: Status: RESOLVED | Noted: 2023-04-19 | Resolved: 2025-04-23

## 2025-04-23 PROBLEM — D70.9 FEBRILE NEUTROPENIA: Status: RESOLVED | Noted: 2021-11-20 | Resolved: 2025-04-23

## 2025-05-04 ENCOUNTER — HEALTH MAINTENANCE LETTER (OUTPATIENT)
Age: 55
End: 2025-05-04

## 2025-05-05 ENCOUNTER — INFUSION THERAPY VISIT (OUTPATIENT)
Dept: ONCOLOGY | Facility: CLINIC | Age: 55
End: 2025-05-05
Payer: COMMERCIAL

## 2025-05-05 VITALS
RESPIRATION RATE: 16 BRPM | HEART RATE: 85 BPM | BODY MASS INDEX: 26.5 KG/M2 | OXYGEN SATURATION: 99 % | SYSTOLIC BLOOD PRESSURE: 152 MMHG | WEIGHT: 164.2 LBS | TEMPERATURE: 98 F | DIASTOLIC BLOOD PRESSURE: 101 MMHG

## 2025-05-05 DIAGNOSIS — C90.00 MULTIPLE MYELOMA NOT HAVING ACHIEVED REMISSION (H): Primary | ICD-10-CM

## 2025-05-05 LAB
ALBUMIN SERPL BCG-MCNC: 4.5 G/DL (ref 3.5–5.2)
ALP SERPL-CCNC: 61 U/L (ref 40–150)
ALT SERPL W P-5'-P-CCNC: 22 U/L (ref 0–70)
ANION GAP SERPL CALCULATED.3IONS-SCNC: 9 MMOL/L (ref 7–15)
AST SERPL W P-5'-P-CCNC: 35 U/L (ref 0–45)
BASOPHILS # BLD AUTO: 0 10E3/UL (ref 0–0.2)
BASOPHILS NFR BLD AUTO: 0 %
BILIRUB SERPL-MCNC: 0.5 MG/DL
BUN SERPL-MCNC: 15.9 MG/DL (ref 6–20)
CALCIUM SERPL-MCNC: 9.1 MG/DL (ref 8.8–10.4)
CHLORIDE SERPL-SCNC: 104 MMOL/L (ref 98–107)
CREAT SERPL-MCNC: 1.21 MG/DL (ref 0.67–1.17)
EGFRCR SERPLBLD CKD-EPI 2021: 70 ML/MIN/1.73M2
EOSINOPHIL # BLD AUTO: 0.2 10E3/UL (ref 0–0.7)
EOSINOPHIL NFR BLD AUTO: 3 %
ERYTHROCYTE [DISTWIDTH] IN BLOOD BY AUTOMATED COUNT: 15.5 % (ref 10–15)
GLUCOSE SERPL-MCNC: 99 MG/DL (ref 70–99)
HCO3 SERPL-SCNC: 23 MMOL/L (ref 22–29)
HCT VFR BLD AUTO: 44.9 % (ref 40–53)
HGB BLD-MCNC: 15.1 G/DL (ref 13.3–17.7)
IMM GRANULOCYTES # BLD: 0 10E3/UL
IMM GRANULOCYTES NFR BLD: 0 %
LYMPHOCYTES # BLD AUTO: 2.1 10E3/UL (ref 0.8–5.3)
LYMPHOCYTES NFR BLD AUTO: 29 %
MCH RBC QN AUTO: 31.1 PG (ref 26.5–33)
MCHC RBC AUTO-ENTMCNC: 33.6 G/DL (ref 31.5–36.5)
MCV RBC AUTO: 92 FL (ref 78–100)
MONOCYTES # BLD AUTO: 0.6 10E3/UL (ref 0–1.3)
MONOCYTES NFR BLD AUTO: 8 %
NEUTROPHILS # BLD AUTO: 4.2 10E3/UL (ref 1.6–8.3)
NEUTROPHILS NFR BLD AUTO: 60 %
NRBC # BLD AUTO: 0 10E3/UL
NRBC BLD AUTO-RTO: 0 /100
PLATELET # BLD AUTO: 311 10E3/UL (ref 150–450)
POTASSIUM SERPL-SCNC: 4 MMOL/L (ref 3.4–5.3)
PROT SERPL-MCNC: 6.5 G/DL (ref 6.4–8.3)
RBC # BLD AUTO: 4.86 10E6/UL (ref 4.4–5.9)
SODIUM SERPL-SCNC: 136 MMOL/L (ref 135–145)
TOTAL PROTEIN SERUM FOR ELP: 6.3 G/DL (ref 6.4–8.3)
WBC # BLD AUTO: 7.1 10E3/UL (ref 4–11)

## 2025-05-05 PROCEDURE — 36415 COLL VENOUS BLD VENIPUNCTURE: CPT

## 2025-05-05 PROCEDURE — 84155 ASSAY OF PROTEIN SERUM: CPT

## 2025-05-05 PROCEDURE — 86334 IMMUNOFIX E-PHORESIS SERUM: CPT | Performed by: STUDENT IN AN ORGANIZED HEALTH CARE EDUCATION/TRAINING PROGRAM

## 2025-05-05 PROCEDURE — 91320 SARSCV2 VAC 30MCG TRS-SUC IM: CPT | Performed by: NURSE PRACTITIONER

## 2025-05-05 PROCEDURE — 83521 IG LIGHT CHAINS FREE EACH: CPT

## 2025-05-05 PROCEDURE — 250N000011 HC RX IP 250 OP 636: Performed by: NURSE PRACTITIONER

## 2025-05-05 PROCEDURE — 250N000011 HC RX IP 250 OP 636: Mod: JZ | Performed by: REGISTERED NURSE

## 2025-05-05 PROCEDURE — 96401 CHEMO ANTI-NEOPL SQ/IM: CPT

## 2025-05-05 PROCEDURE — 84165 PROTEIN E-PHORESIS SERUM: CPT | Mod: TC | Performed by: STUDENT IN AN ORGANIZED HEALTH CARE EDUCATION/TRAINING PROGRAM

## 2025-05-05 PROCEDURE — 85004 AUTOMATED DIFF WBC COUNT: CPT

## 2025-05-05 PROCEDURE — 82784 ASSAY IGA/IGD/IGG/IGM EACH: CPT

## 2025-05-05 PROCEDURE — 90480 ADMN SARSCOV2 VAC 1/ONLY CMP: CPT | Performed by: NURSE PRACTITIONER

## 2025-05-05 RX ADMIN — COVID-19 VACCINE, MRNA 30 MCG: 0.04 INJECTION, SUSPENSION INTRAMUSCULAR at 14:29

## 2025-05-05 RX ADMIN — DARATUMUMAB AND HYALURONIDASE-FIHJ (HUMAN RECOMBINANT) 1800 MG: 1800; 30000 INJECTION SUBCUTANEOUS at 14:34

## 2025-05-05 ASSESSMENT — PAIN SCALES - GENERAL: PAINLEVEL_OUTOF10: NO PAIN (0)

## 2025-05-05 NOTE — PATIENT INSTRUCTIONS
When to Call for Help  Coping with the changes brought about by cancer treatment is easier when you know  what to expect and when to call for help. Each person reacts in a different way to  chemotherapy; talk to your doctor about new side effects or any changes that worry you.  If you notice any of the following, please contact your doctor or the clinic:  ? A fever of 100.5 F (38 C) or greater  ? Shaking or chills  ? Shortness of breath  ? Repeated signs of bleeding, such as nose bleeds, easy bruising or black tarry stools  ? Mouth sores that stop you from eating or cause pain when you swallow  ? Nausea and vomiting that do not get better  ? Diarrhea that does not get better  ? Yellowing of the skin and/or eyes  ? Extreme weakness  ? Feeling confused or extremely sleepy  ? Constipation for more than 48 hours  ? Swelling of feet/legs or hands/arms  ? Any new symptoms that you did not have before your treatments.    If you need help managing symptoms and side effects at home:  Please call our NURSE TRIAGE TEAM at [phone] option 5, option 2  and ask for the triage nurse.    For informational purposes only. Not to replace the advice of your health care provider.    +++ For prescription refills, call your local pharmacy to initiate a refill  request or call our Nurse Triage Team at the [phone]. +++

## 2025-05-05 NOTE — PROGRESS NOTES
Infusion Nursing Note:  June Ronquillo presents today for C43D1 Darzalex Nfidko-MHATE-10 mRNA vaccine.    Patient seen by provider today: No   present during visit today: Not Applicable.    Note: Patient denies the following: fevers, body aches, chills, headaches, vision changes, dizziness, chest pain, shortness of breath, nausea, vomiting, constipation, abdominal pain, rashes, bruising/bleeding, mouth sores, swelling or pain in the legs.     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    Treatment Conditions:   Latest Reference Range & Units 05/05/25 13:54   Sodium 135 - 145 mmol/L 136   Potassium 3.4 - 5.3 mmol/L 4.0   Chloride 98 - 107 mmol/L 104   Carbon Dioxide (CO2) 22 - 29 mmol/L 23   Urea Nitrogen 6.0 - 20.0 mg/dL 15.9   Creatinine 0.67 - 1.17 mg/dL 1.21 (H)   GFR Estimate >60 mL/min/1.73m2 70   Calcium 8.8 - 10.4 mg/dL 9.1   Anion Gap 7 - 15 mmol/L 9   Albumin 3.5 - 5.2 g/dL 4.5   Protein Total 6.4 - 8.3 g/dL 6.5   Alkaline Phosphatase 40 - 150 U/L 61   ALT 0 - 70 U/L 22   AST 0 - 45 U/L 35   Bilirubin Total <=1.2 mg/dL 0.5   Glucose 70 - 99 mg/dL 99   WBC 4.0 - 11.0 10e3/uL 7.1   Hemoglobin 13.3 - 17.7 g/dL 15.1   Hematocrit 40.0 - 53.0 % 44.9   Platelet Count 150 - 450 10e3/uL 311   RBC Count 4.40 - 5.90 10e6/uL 4.86   MCV 78 - 100 fL 92   MCH 26.5 - 33.0 pg 31.1   MCHC 31.5 - 36.5 g/dL 33.6   RDW 10.0 - 15.0 % 15.5 (H)   % Neutrophils % 60   % Lymphocytes % 29   % Monocytes % 8   % Eosinophils % 3   % Basophils % 0   % Immature Granulocytes % 0   NRBC/W <1 /100 0   Absolute Neutrophil 1.6 - 8.3 10e3/uL 4.2   Absolute Lymphocytes 0.8 - 5.3 10e3/uL  2.1   Absolute Monocytes 0.0 - 1.3 10e3/uL 0.6   Absolute Eosinophils 0.0 - 0.7 10e3/uL 0.2   Absolute Basophils 0.0 - 0.2 10e3/uL 0.0   Absolute Immature Granulocytes <=0.4 10e3/uL 0.0   Absolute NRBCs 10e3/uL 0.0   Total Protein Serum for ELP 6.4 - 8.3 g/dL 6.3 (L)     Post Infusion Assessment:  Patient tolerated Darzalex Faspro injection into RLQ of abdomen without incident.  Patient tolerated COVID injection into Left Deltoid without incident.  Patient observed for 15 minutes post Covid injection per protocol.       Discharge Plan:   Patient declined prescription refills.  Discharge instructions reviewed with: Patient.  Patient and/or family verbalized understanding of discharge instructions and all questions answered.  AVS to patient via [employer].  Patient will return 6/2 for next appointment.   Patient discharged in stable condition accompanied by: self.  Departure Mode: Ambulatory.    Dalia Montoya RN

## 2025-05-06 LAB
IGA SERPL-MCNC: 60 MG/DL (ref 84–499)
IGG SERPL-MCNC: 539 MG/DL (ref 610–1616)
IGM SERPL-MCNC: 19 MG/DL (ref 35–242)
KAPPA LC FREE SER-MCNC: 0.75 MG/DL (ref 0.33–1.94)
KAPPA LC FREE/LAMBDA FREE SER NEPH: 1.14 {RATIO} (ref 0.26–1.65)
LAMBDA LC FREE SERPL-MCNC: 0.66 MG/DL (ref 0.57–2.63)

## 2025-05-07 LAB
ALBUMIN SERPL ELPH-MCNC: 4.4 G/DL (ref 3.7–5.1)
ALPHA1 GLOB SERPL ELPH-MCNC: 0.2 G/DL (ref 0.2–0.4)
ALPHA2 GLOB SERPL ELPH-MCNC: 0.5 G/DL (ref 0.5–0.9)
B-GLOBULIN SERPL ELPH-MCNC: 0.7 G/DL (ref 0.6–1)
GAMMA GLOB SERPL ELPH-MCNC: 0.5 G/DL (ref 0.7–1.6)
LOCATION OF TASK: ABNORMAL
LOCATION OF TASK: NORMAL
M PROTEIN SERPL ELPH-MCNC: 0.1 G/DL
PROT PATTERN SERPL ELPH-IMP: ABNORMAL
PROT PATTERN SERPL IFE-IMP: NORMAL

## 2025-05-13 DIAGNOSIS — C90.00 MULTIPLE MYELOMA NOT HAVING ACHIEVED REMISSION (H): Primary | ICD-10-CM

## 2025-05-13 RX ORDER — LENALIDOMIDE 10 MG/1
10 CAPSULE ORAL DAILY
Qty: 21 CAPSULE | Refills: 0 | Status: SHIPPED | OUTPATIENT
Start: 2025-06-02

## 2025-05-14 ENCOUNTER — OFFICE VISIT (OUTPATIENT)
Dept: FAMILY MEDICINE | Facility: CLINIC | Age: 55
End: 2025-05-14
Payer: COMMERCIAL

## 2025-05-14 ENCOUNTER — TELEPHONE (OUTPATIENT)
Dept: ONCOLOGY | Facility: CLINIC | Age: 55
End: 2025-05-14

## 2025-05-14 VITALS
SYSTOLIC BLOOD PRESSURE: 130 MMHG | HEIGHT: 66 IN | DIASTOLIC BLOOD PRESSURE: 85 MMHG | TEMPERATURE: 98.7 F | HEART RATE: 99 BPM | RESPIRATION RATE: 20 BRPM | WEIGHT: 160 LBS | BODY MASS INDEX: 25.71 KG/M2 | OXYGEN SATURATION: 99 %

## 2025-05-14 DIAGNOSIS — I10 BENIGN ESSENTIAL HYPERTENSION: Primary | ICD-10-CM

## 2025-05-14 DIAGNOSIS — C90.00 MULTIPLE MYELOMA NOT HAVING ACHIEVED REMISSION (H): ICD-10-CM

## 2025-05-14 PROCEDURE — 99213 OFFICE O/P EST LOW 20 MIN: CPT

## 2025-05-14 PROCEDURE — G2211 COMPLEX E/M VISIT ADD ON: HCPCS

## 2025-05-14 ASSESSMENT — ENCOUNTER SYMPTOMS: HYPERTENSION: 1

## 2025-05-14 NOTE — PROGRESS NOTES
"  Assessment & Plan     Benign essential hypertension  Started hydrochlorothiazide and losartan 3 days ago per recommendations from cardiology. BP stable today. Plan for patient to continue current medication regimen, continue to work on lifestyle changes and complete follow up lab in 2 weeks.     The longitudinal plan of care for the diagnosis(es)/condition(s) as documented were addressed during this visit. Due to the added complexity in care, I will continue to support June in the subsequent management and with ongoing continuity of care.    BMI  Estimated body mass index is 25.82 kg/m  as calculated from the following:    Height as of this encounter: 1.676 m (5' 6\").    Weight as of this encounter: 72.6 kg (160 lb).             Nic Watkins is a 54 year old, presenting for the following health issues:  Hypertension      5/14/2025     1:25 PM   Additional Questions   Roomed by manjit stokes     Hypertension     History of Present Illness       Hypertension: He presents for follow up of hypertension.  He does check blood pressure  regularly outside of the clinic. Outside blood pressures have been over 140/90. He follows a low salt diet.     He eats 2-3 servings of fruits and vegetables daily.He consumes 0 sweetened beverage(s) daily.He exercises with enough effort to increase his heart rate 10 to 19 minutes per day.  He exercises with enough effort to increase his heart rate 3 or less days per week.   He is taking medications regularly.      Pt reports he travels for work and bp has been high.  He reports he has been making lifestyle changes.   Has stopped traveling  Started to make dinners at home  Has reduced salt intake  Is checking blood pressures at home and are consistent with current in office blood pressure          Objective    /85 (BP Location: Right arm, Patient Position: Sitting, Cuff Size: Adult Regular)   Pulse 99   Temp 98.7  F (37.1  C) (Temporal)   Resp 20   Ht 1.676 m (5' " "6\")   Wt 72.6 kg (160 lb)   SpO2 99%   BMI 25.82 kg/m    Body mass index is 25.82 kg/m .  Physical Exam   GENERAL: alert and no distress  CV: regular rate and rhythm, normal S1 S2, no S3 or S4, no murmur, click or rub, no peripheral edema  PSYCH: mentation appears normal, affect normal/bright            Signed Electronically by: PATTY Wells CNP    "

## 2025-05-14 NOTE — TELEPHONE ENCOUNTER
Oral Chemotherapy Monitoring Program   Medication: Revlimid  Rx: 10mg PO daily on days 1 through 21 of 28 day cycle   Auth #: [id number]   Risk Category: Adult Male  Routine survey questions reviewed.   Rx to be Escribed to [facility]    Yu Goldberg  [facility]  Oncology Pharmacy Liaison   [email or web address]  [phone] (phone)  [phone] (fax)

## 2025-05-15 RX ORDER — LENALIDOMIDE 10 MG/1
CAPSULE ORAL
Refills: 0 | OUTPATIENT
Start: 2025-05-15

## 2025-05-20 DIAGNOSIS — G44.219 EPISODIC TENSION-TYPE HEADACHE, NOT INTRACTABLE: Primary | ICD-10-CM

## 2025-05-29 ENCOUNTER — MYC MEDICAL ADVICE (OUTPATIENT)
Dept: ENDOCRINOLOGY | Facility: CLINIC | Age: 55
End: 2025-05-29
Payer: COMMERCIAL

## 2025-05-29 DIAGNOSIS — R79.89 LOW TESTOSTERONE IN MALE: ICD-10-CM

## 2025-05-29 RX ORDER — TESTOSTERONE CYPIONATE 200 MG/ML
150 INJECTION, SOLUTION INTRAMUSCULAR
Qty: 10 ML | Refills: 0 | Status: SHIPPED | OUTPATIENT
Start: 2025-05-29

## 2025-05-29 RX ORDER — TESTOSTERONE CYPIONATE 200 MG/ML
150 INJECTION, SOLUTION INTRAMUSCULAR
Qty: 10 ML | Refills: 0 | Status: CANCELLED | OUTPATIENT
Start: 2025-05-29

## 2025-05-29 NOTE — CONFIDENTIAL NOTE
NEUROLOGY - PRE VISIT PLANNING             Referring Provider Reason for Referral Office Visit Notes   Georgina Cabezas, APRN CNP   [facility] Episodic tension-type headache, not intractable  4/9/2025        IMAGING/NOTES/SCANS Status/ Location  DATE   MRI/MRA(HEAD, NECK, SPINE) N/A     CT/CTA   N/A     EMG N/A    EEG N/A    LABS Internal    Neuropsychological testing  N/A    Additional Testing/Notes N/A      Does patient have C2C?  Year last updated Action     YES   []      Please update at appointment if outdated more than 5 years       NO     [x]   N/A   Please complete C2C at appointment

## 2025-06-02 ENCOUNTER — INFUSION THERAPY VISIT (OUTPATIENT)
Dept: ONCOLOGY | Facility: CLINIC | Age: 55
End: 2025-06-02
Payer: COMMERCIAL

## 2025-06-02 VITALS
OXYGEN SATURATION: 100 % | BODY MASS INDEX: 25.94 KG/M2 | SYSTOLIC BLOOD PRESSURE: 127 MMHG | DIASTOLIC BLOOD PRESSURE: 72 MMHG | WEIGHT: 160.7 LBS | HEART RATE: 86 BPM | TEMPERATURE: 98.2 F

## 2025-06-02 DIAGNOSIS — I25.10 CVD (CARDIOVASCULAR DISEASE): ICD-10-CM

## 2025-06-02 DIAGNOSIS — E55.9 VITAMIN D INSUFFICIENCY: ICD-10-CM

## 2025-06-02 DIAGNOSIS — C90.00 MULTIPLE MYELOMA NOT HAVING ACHIEVED REMISSION (H): ICD-10-CM

## 2025-06-02 DIAGNOSIS — C73 PAPILLARY MICROCARCINOMA OF THYROID (H): ICD-10-CM

## 2025-06-02 DIAGNOSIS — I25.10 CORONARY ARTERY DISEASE INVOLVING NATIVE CORONARY ARTERY OF NATIVE HEART WITHOUT ANGINA PECTORIS: ICD-10-CM

## 2025-06-02 DIAGNOSIS — I10 BENIGN ESSENTIAL HYPERTENSION: ICD-10-CM

## 2025-06-02 DIAGNOSIS — C90.00 MULTIPLE MYELOMA NOT HAVING ACHIEVED REMISSION (H): Primary | ICD-10-CM

## 2025-06-02 DIAGNOSIS — E78.5 HYPERLIPIDEMIA LDL GOAL <100: ICD-10-CM

## 2025-06-02 DIAGNOSIS — Z95.5 PRESENCE OF DRUG-ELUTING STENT IN LEFT CIRCUMFLEX CORONARY ARTERY: ICD-10-CM

## 2025-06-02 LAB
ALBUMIN SERPL BCG-MCNC: 4.5 G/DL (ref 3.5–5.2)
ALP SERPL-CCNC: 65 U/L (ref 40–150)
ALT SERPL W P-5'-P-CCNC: 21 U/L (ref 0–70)
ANION GAP SERPL CALCULATED.3IONS-SCNC: 13 MMOL/L (ref 7–15)
AST SERPL W P-5'-P-CCNC: 35 U/L (ref 0–45)
BASOPHILS # BLD AUTO: 0 10E3/UL (ref 0–0.2)
BASOPHILS NFR BLD AUTO: 1 %
BILIRUB SERPL-MCNC: 0.7 MG/DL
BUN SERPL-MCNC: 12.5 MG/DL (ref 6–20)
CALCIUM SERPL-MCNC: 8.8 MG/DL (ref 8.8–10.4)
CHLORIDE SERPL-SCNC: 102 MMOL/L (ref 98–107)
CREAT SERPL-MCNC: 1.15 MG/DL (ref 0.67–1.17)
EGFRCR SERPLBLD CKD-EPI 2021: 75 ML/MIN/1.73M2
EOSINOPHIL # BLD AUTO: 0.2 10E3/UL (ref 0–0.7)
EOSINOPHIL NFR BLD AUTO: 2 %
ERYTHROCYTE [DISTWIDTH] IN BLOOD BY AUTOMATED COUNT: 15.2 % (ref 10–15)
GLUCOSE SERPL-MCNC: 143 MG/DL (ref 70–99)
HCO3 SERPL-SCNC: 22 MMOL/L (ref 22–29)
HCT VFR BLD AUTO: 44.4 % (ref 40–53)
HGB BLD-MCNC: 15.1 G/DL (ref 13.3–17.7)
IMM GRANULOCYTES # BLD: 0 10E3/UL
IMM GRANULOCYTES NFR BLD: 0 %
LYMPHOCYTES # BLD AUTO: 1.8 10E3/UL (ref 0.8–5.3)
LYMPHOCYTES NFR BLD AUTO: 24 %
MCH RBC QN AUTO: 31.2 PG (ref 26.5–33)
MCHC RBC AUTO-ENTMCNC: 34 G/DL (ref 31.5–36.5)
MCV RBC AUTO: 92 FL (ref 78–100)
MONOCYTES # BLD AUTO: 0.5 10E3/UL (ref 0–1.3)
MONOCYTES NFR BLD AUTO: 6 %
NEUTROPHILS # BLD AUTO: 4.9 10E3/UL (ref 1.6–8.3)
NEUTROPHILS NFR BLD AUTO: 67 %
NRBC # BLD AUTO: 0 10E3/UL
NRBC BLD AUTO-RTO: 0 /100
PLATELET # BLD AUTO: 327 10E3/UL (ref 150–450)
POTASSIUM SERPL-SCNC: 3.3 MMOL/L (ref 3.4–5.3)
PROT SERPL-MCNC: 6.5 G/DL (ref 6.4–8.3)
PTH-INTACT SERPL-MCNC: 30 PG/ML (ref 15–65)
RBC # BLD AUTO: 4.84 10E6/UL (ref 4.4–5.9)
SODIUM SERPL-SCNC: 137 MMOL/L (ref 135–145)
TOTAL PROTEIN SERUM FOR ELP: 6.2 G/DL (ref 6.4–8.3)
TSH SERPL DL<=0.005 MIU/L-ACNC: 3.18 UIU/ML (ref 0.3–4.2)
VIT D+METAB SERPL-MCNC: 38 NG/ML (ref 20–50)
WBC # BLD AUTO: 7.3 10E3/UL (ref 4–11)

## 2025-06-02 PROCEDURE — 86800 THYROGLOBULIN ANTIBODY: CPT

## 2025-06-02 PROCEDURE — 84165 PROTEIN E-PHORESIS SERUM: CPT | Mod: TC | Performed by: PATHOLOGY

## 2025-06-02 PROCEDURE — 96401 CHEMO ANTI-NEOPL SQ/IM: CPT

## 2025-06-02 PROCEDURE — 83970 ASSAY OF PARATHORMONE: CPT

## 2025-06-02 PROCEDURE — 82306 VITAMIN D 25 HYDROXY: CPT

## 2025-06-02 PROCEDURE — 84165 PROTEIN E-PHORESIS SERUM: CPT | Mod: 26 | Performed by: PATHOLOGY

## 2025-06-02 PROCEDURE — 86334 IMMUNOFIX E-PHORESIS SERUM: CPT | Performed by: PATHOLOGY

## 2025-06-02 PROCEDURE — 86334 IMMUNOFIX E-PHORESIS SERUM: CPT | Mod: 26 | Performed by: PATHOLOGY

## 2025-06-02 PROCEDURE — 84155 ASSAY OF PROTEIN SERUM: CPT

## 2025-06-02 PROCEDURE — 84443 ASSAY THYROID STIM HORMONE: CPT

## 2025-06-02 PROCEDURE — 82784 ASSAY IGA/IGD/IGG/IGM EACH: CPT

## 2025-06-02 PROCEDURE — 80053 COMPREHEN METABOLIC PANEL: CPT

## 2025-06-02 PROCEDURE — 36415 COLL VENOUS BLD VENIPUNCTURE: CPT

## 2025-06-02 PROCEDURE — 85025 COMPLETE CBC W/AUTO DIFF WBC: CPT

## 2025-06-02 PROCEDURE — 83521 IG LIGHT CHAINS FREE EACH: CPT

## 2025-06-02 PROCEDURE — 250N000011 HC RX IP 250 OP 636: Mod: JZ | Performed by: REGISTERED NURSE

## 2025-06-02 RX ADMIN — DARATUMUMAB AND HYALURONIDASE-FIHJ (HUMAN RECOMBINANT) 1800 MG: 1800; 30000 INJECTION SUBCUTANEOUS at 14:19

## 2025-06-02 ASSESSMENT — PAIN SCALES - GENERAL: PAINLEVEL_OUTOF10: NO PAIN (0)

## 2025-06-02 NOTE — PROGRESS NOTES
Infusion Nursing Note:  June Ronquillo presents today for C44D1 Darzalex Faspro.    Patient seen by provider today: No   present during visit today: Not Applicable.    Note: Patient denied fevers, chills, cough, SOB, and chest pain. Reported cough a few weeks ago that is now resolved. Eating and drinking well. He denied any concerns today.    Patient's potassium resulted after he left the infusion room. MYCHART sent to patient to encourage eating potassium rich foods. IB to care team to follow up as needed.     Intravenous Access:  Labs drawn without difficulty.    Treatment Conditions:   Latest Reference Range & Units 06/02/25 13:50   Sodium 135 - 145 mmol/L 137   Potassium 3.4 - 5.3 mmol/L 3.3 (L)   Chloride 98 - 107 mmol/L 102   Carbon Dioxide (CO2) 22 - 29 mmol/L 22   Urea Nitrogen 6.0 - 20.0 mg/dL 12.5   Creatinine 0.67 - 1.17 mg/dL 1.15   GFR Estimate >60 mL/min/1.73m2 75   Calcium 8.8 - 10.4 mg/dL 8.8   Anion Gap 7 - 15 mmol/L 13   Albumin 3.5 - 5.2 g/dL 4.5   Protein Total 6.4 - 8.3 g/dL 6.5   Alkaline Phosphatase 40 - 150 U/L 65   ALT 0 - 70 U/L 21   AST 0 - 45 U/L 35   Bilirubin Total <=1.2 mg/dL 0.7   Glucose 70 - 99 mg/dL 143 (H)   TSH 0.30 - 4.20 uIU/mL 3.18   WBC 4.0 - 11.0 10e3/uL 7.3   Hemoglobin 13.3 - 17.7 g/dL 15.1   Hematocrit 40.0 - 53.0 % 44.4   Platelet Count 150 - 450 10e3/uL 327   RBC Count 4.40 - 5.90 10e6/uL 4.84   MCV 78 - 100 fL 92   MCH 26.5 - 33.0 pg 31.2   MCHC 31.5 - 36.5 g/dL 34.0   RDW 10.0 - 15.0 % 15.2 (H)   % Neutrophils % 67   % Lymphocytes % 24   % Monocytes % 6   % Eosinophils % 2   % Basophils % 1   % Immature Granulocytes % 0   NRBC/W <1 /100 0   Absolute Neutrophil 1.6 - 8.3 10e3/uL 4.9   Absolute Lymphocytes 0.8 - 5.3 10e3/uL 1.8   Absolute Monocytes 0.0 - 1.3 10e3/uL 0.5   Absolute Eosinophils 0.0 - 0.7 10e3/uL 0.2   Absolute Basophils 0.0 - 0.2 10e3/uL 0.0   Absolute Immature Granulocytes <=0.4 10e3/uL 0.0   Absolute NRBCs 10e3/uL 0.0         Post  Infusion Assessment:  Patient tolerated darzalex faspro injection to left abodmen without incident.       Discharge Plan:   Prescription refills given for calcium carbonate.  Discharge instructions reviewed with: Patient.  Patient and/or family verbalized understanding of discharge instructions and all questions answered.  AVS to patient via [employer].  Patient will return 7/1 for next appointment.   Patient discharged in stable condition accompanied by: self.  Departure Mode: Ambulatory.      Candice Bullock RN

## 2025-06-02 NOTE — PATIENT INSTRUCTIONS
[facility] Triage and after hours / weekends / holidays:  [phone] option [phone], option 2    Please call the triage or after hours line if you experience a temperature greater than or equal to 100.4, shaking chills, have uncontrolled nausea, vomiting and/or diarrhea, dizziness, shortness of breath, chest pain, bleeding, unexplained bruising, or if you have any other new/concerning symptoms, questions or concerns.      If you are having any concerning symptoms or wish to speak to a provider before your next infusion visit, please call triage to notify your care team so we can adequately serve you.     If you need a refill on a narcotic prescription or other medication, please call before your infusion appointment.

## 2025-06-03 LAB
ALBUMIN SERPL ELPH-MCNC: 4.2 G/DL (ref 3.7–5.1)
ALPHA1 GLOB SERPL ELPH-MCNC: 0.3 G/DL (ref 0.2–0.4)
ALPHA2 GLOB SERPL ELPH-MCNC: 0.5 G/DL (ref 0.5–0.9)
B-GLOBULIN SERPL ELPH-MCNC: 0.7 G/DL (ref 0.6–1)
GAMMA GLOB SERPL ELPH-MCNC: 0.5 G/DL (ref 0.7–1.6)
IGA SERPL-MCNC: 61 MG/DL (ref 84–499)
IGG SERPL-MCNC: 532 MG/DL (ref 610–1616)
IGM SERPL-MCNC: 23 MG/DL (ref 35–242)
KAPPA LC FREE SER-MCNC: 0.76 MG/DL (ref 0.33–1.94)
KAPPA LC FREE/LAMBDA FREE SER NEPH: 1.17 {RATIO} (ref 0.26–1.65)
LAMBDA LC FREE SERPL-MCNC: 0.65 MG/DL (ref 0.57–2.63)
LOCATION OF TASK: ABNORMAL
LOCATION OF TASK: NORMAL
M PROTEIN SERPL ELPH-MCNC: 0.1 G/DL
PROT PATTERN SERPL ELPH-IMP: ABNORMAL
PROT PATTERN SERPL IFE-IMP: NORMAL

## 2025-06-09 PROBLEM — R79.89 INCREASED THYROID STIMULATING HORMONE (TSH) LEVEL: Status: RESOLVED | Noted: 2021-01-25 | Resolved: 2025-06-09

## 2025-06-09 PROBLEM — M54.9 BACK PAIN: Status: RESOLVED | Noted: 2023-04-19 | Resolved: 2025-06-09

## 2025-06-09 PROBLEM — R76.8 HEPATITIS A ANTIBODY POSITIVE: Status: RESOLVED | Noted: 2021-01-27 | Resolved: 2025-06-09

## 2025-06-10 DIAGNOSIS — C90.00 MULTIPLE MYELOMA NOT HAVING ACHIEVED REMISSION (H): Primary | ICD-10-CM

## 2025-06-10 RX ORDER — LENALIDOMIDE 10 MG/1
10 CAPSULE ORAL DAILY
Qty: 21 CAPSULE | Refills: 0 | Status: SHIPPED | OUTPATIENT
Start: 2025-07-01

## 2025-06-11 ENCOUNTER — PATIENT OUTREACH (OUTPATIENT)
Dept: CARE COORDINATION | Facility: CLINIC | Age: 55
End: 2025-06-11
Payer: COMMERCIAL

## 2025-06-12 ENCOUNTER — TELEPHONE (OUTPATIENT)
Dept: ONCOLOGY | Facility: CLINIC | Age: 55
End: 2025-06-12
Payer: COMMERCIAL

## 2025-06-12 DIAGNOSIS — C90.00 MULTIPLE MYELOMA NOT HAVING ACHIEVED REMISSION (H): ICD-10-CM

## 2025-06-12 RX ORDER — LENALIDOMIDE 10 MG/1
CAPSULE ORAL
Refills: 0 | OUTPATIENT
Start: 2025-06-12

## 2025-06-12 NOTE — TELEPHONE ENCOUNTER
Oral Chemotherapy Monitoring Program   Medication: Revlimid  Rx: 10mg PO daily on days 1 through 21 of 28 day cycle   Auth #: [id number]   Risk Category: Adult Male  Routine survey questions reviewed.   Rx to be Escribed to [facility]    Yu Goldberg  [facility]  Oncology Pharmacy Liaison   [email or web address]  [phone] (phone)  [phone] (fax)

## 2025-06-15 DIAGNOSIS — C90.00 MULTIPLE MYELOMA NOT HAVING ACHIEVED REMISSION (H): Primary | ICD-10-CM

## 2025-06-15 RX ORDER — ACETAMINOPHEN 325 MG/1
650 TABLET ORAL ONCE
Start: 2025-07-18 | End: 2025-07-18

## 2025-06-15 RX ORDER — MEPERIDINE HYDROCHLORIDE 25 MG/ML
25 INJECTION INTRAMUSCULAR; INTRAVENOUS; SUBCUTANEOUS
OUTPATIENT
Start: 2025-06-20

## 2025-06-15 RX ORDER — ALBUTEROL SULFATE 90 UG/1
1-2 INHALANT RESPIRATORY (INHALATION)
Start: 2025-06-20

## 2025-06-15 RX ORDER — DIPHENHYDRAMINE HCL 25 MG
50 CAPSULE ORAL ONCE
Start: 2025-07-18 | End: 2025-07-18

## 2025-06-15 RX ORDER — METHYLPREDNISOLONE SODIUM SUCCINATE 40 MG/ML
40 INJECTION INTRAMUSCULAR; INTRAVENOUS
Start: 2025-09-12

## 2025-06-15 RX ORDER — DIPHENHYDRAMINE HYDROCHLORIDE 50 MG/ML
25 INJECTION, SOLUTION INTRAMUSCULAR; INTRAVENOUS
Start: 2025-07-18

## 2025-06-15 RX ORDER — EPINEPHRINE 1 MG/ML
0.3 INJECTION, SOLUTION INTRAMUSCULAR; SUBCUTANEOUS EVERY 5 MIN PRN
OUTPATIENT
Start: 2025-09-12

## 2025-06-15 RX ORDER — HEPARIN SODIUM,PORCINE 10 UNIT/ML
5 VIAL (ML) INTRAVENOUS
OUTPATIENT
Start: 2025-08-15

## 2025-06-15 RX ORDER — METHYLPREDNISOLONE SODIUM SUCCINATE 40 MG/ML
40 INJECTION INTRAMUSCULAR; INTRAVENOUS
Start: 2025-07-18

## 2025-06-15 RX ORDER — METHYLPREDNISOLONE SODIUM SUCCINATE 40 MG/ML
40 INJECTION INTRAMUSCULAR; INTRAVENOUS
Start: 2025-08-15

## 2025-06-15 RX ORDER — DEXAMETHASONE 4 MG/1
12 TABLET ORAL ONCE
Start: 2025-08-15 | End: 2025-08-15

## 2025-06-15 RX ORDER — ALBUTEROL SULFATE 90 UG/1
1-2 INHALANT RESPIRATORY (INHALATION)
Start: 2025-08-15

## 2025-06-15 RX ORDER — MEPERIDINE HYDROCHLORIDE 25 MG/ML
25 INJECTION INTRAMUSCULAR; INTRAVENOUS; SUBCUTANEOUS
OUTPATIENT
Start: 2025-08-15

## 2025-06-15 RX ORDER — METHYLPREDNISOLONE SODIUM SUCCINATE 40 MG/ML
40 INJECTION INTRAMUSCULAR; INTRAVENOUS
Start: 2025-06-20

## 2025-06-15 RX ORDER — DEXAMETHASONE 4 MG/1
12 TABLET ORAL ONCE
Start: 2025-07-18 | End: 2025-07-18

## 2025-06-15 RX ORDER — MEPERIDINE HYDROCHLORIDE 25 MG/ML
25 INJECTION INTRAMUSCULAR; INTRAVENOUS; SUBCUTANEOUS
OUTPATIENT
Start: 2025-07-18

## 2025-06-15 RX ORDER — HEPARIN SODIUM (PORCINE) LOCK FLUSH IV SOLN 100 UNIT/ML 100 UNIT/ML
5 SOLUTION INTRAVENOUS
OUTPATIENT
Start: 2025-06-20

## 2025-06-15 RX ORDER — HEPARIN SODIUM (PORCINE) LOCK FLUSH IV SOLN 100 UNIT/ML 100 UNIT/ML
5 SOLUTION INTRAVENOUS
OUTPATIENT
Start: 2025-09-12

## 2025-06-15 RX ORDER — ACETAMINOPHEN 325 MG/1
650 TABLET ORAL ONCE
Start: 2025-08-15 | End: 2025-08-15

## 2025-06-15 RX ORDER — DIPHENHYDRAMINE HYDROCHLORIDE 50 MG/ML
25 INJECTION, SOLUTION INTRAMUSCULAR; INTRAVENOUS
Start: 2025-06-20

## 2025-06-15 RX ORDER — DIPHENHYDRAMINE HYDROCHLORIDE 50 MG/ML
25 INJECTION, SOLUTION INTRAMUSCULAR; INTRAVENOUS
Start: 2025-09-12

## 2025-06-15 RX ORDER — ACETAMINOPHEN 325 MG/1
650 TABLET ORAL ONCE
Start: 2025-09-12 | End: 2025-09-12

## 2025-06-15 RX ORDER — DEXAMETHASONE 4 MG/1
12 TABLET ORAL ONCE
Start: 2025-09-12 | End: 2025-09-12

## 2025-06-15 RX ORDER — HEPARIN SODIUM,PORCINE 10 UNIT/ML
5 VIAL (ML) INTRAVENOUS
OUTPATIENT
Start: 2025-07-18

## 2025-06-15 RX ORDER — ALBUTEROL SULFATE 0.83 MG/ML
2.5 SOLUTION RESPIRATORY (INHALATION)
OUTPATIENT
Start: 2025-06-20

## 2025-06-15 RX ORDER — EPINEPHRINE 1 MG/ML
0.3 INJECTION, SOLUTION INTRAMUSCULAR; SUBCUTANEOUS EVERY 5 MIN PRN
OUTPATIENT
Start: 2025-06-20

## 2025-06-15 RX ORDER — DIPHENHYDRAMINE HYDROCHLORIDE 50 MG/ML
25 INJECTION, SOLUTION INTRAMUSCULAR; INTRAVENOUS
Start: 2025-08-15

## 2025-06-15 RX ORDER — DIPHENHYDRAMINE HCL 25 MG
50 CAPSULE ORAL ONCE
Start: 2025-06-20 | End: 2025-06-20

## 2025-06-15 RX ORDER — HEPARIN SODIUM (PORCINE) LOCK FLUSH IV SOLN 100 UNIT/ML 100 UNIT/ML
5 SOLUTION INTRAVENOUS
OUTPATIENT
Start: 2025-07-18

## 2025-06-15 RX ORDER — ALBUTEROL SULFATE 90 UG/1
1-2 INHALANT RESPIRATORY (INHALATION)
Start: 2025-07-18

## 2025-06-15 RX ORDER — DIPHENHYDRAMINE HYDROCHLORIDE 50 MG/ML
50 INJECTION, SOLUTION INTRAMUSCULAR; INTRAVENOUS
Start: 2025-06-20

## 2025-06-15 RX ORDER — ACETAMINOPHEN 325 MG/1
650 TABLET ORAL ONCE
Start: 2025-06-20 | End: 2025-06-20

## 2025-06-15 RX ORDER — DIPHENHYDRAMINE HYDROCHLORIDE 50 MG/ML
50 INJECTION, SOLUTION INTRAMUSCULAR; INTRAVENOUS
Start: 2025-08-15

## 2025-06-15 RX ORDER — DIPHENHYDRAMINE HCL 25 MG
50 CAPSULE ORAL ONCE
Start: 2025-09-12 | End: 2025-09-12

## 2025-06-15 RX ORDER — DIPHENHYDRAMINE HCL 25 MG
50 CAPSULE ORAL ONCE
Start: 2025-08-15 | End: 2025-08-15

## 2025-06-15 RX ORDER — ALBUTEROL SULFATE 0.83 MG/ML
2.5 SOLUTION RESPIRATORY (INHALATION)
OUTPATIENT
Start: 2025-08-15

## 2025-06-15 RX ORDER — DIPHENHYDRAMINE HYDROCHLORIDE 50 MG/ML
50 INJECTION, SOLUTION INTRAMUSCULAR; INTRAVENOUS
Start: 2025-09-12

## 2025-06-15 RX ORDER — ALBUTEROL SULFATE 0.83 MG/ML
2.5 SOLUTION RESPIRATORY (INHALATION)
OUTPATIENT
Start: 2025-07-18

## 2025-06-15 RX ORDER — HEPARIN SODIUM,PORCINE 10 UNIT/ML
5 VIAL (ML) INTRAVENOUS
OUTPATIENT
Start: 2025-06-20

## 2025-06-15 RX ORDER — ALBUTEROL SULFATE 0.83 MG/ML
2.5 SOLUTION RESPIRATORY (INHALATION)
OUTPATIENT
Start: 2025-09-12

## 2025-06-15 RX ORDER — HEPARIN SODIUM (PORCINE) LOCK FLUSH IV SOLN 100 UNIT/ML 100 UNIT/ML
5 SOLUTION INTRAVENOUS
OUTPATIENT
Start: 2025-08-15

## 2025-06-15 RX ORDER — DIPHENHYDRAMINE HYDROCHLORIDE 50 MG/ML
50 INJECTION, SOLUTION INTRAMUSCULAR; INTRAVENOUS
Start: 2025-07-18

## 2025-06-15 RX ORDER — ALBUTEROL SULFATE 90 UG/1
1-2 INHALANT RESPIRATORY (INHALATION)
Start: 2025-09-12

## 2025-06-15 RX ORDER — HEPARIN SODIUM,PORCINE 10 UNIT/ML
5 VIAL (ML) INTRAVENOUS
OUTPATIENT
Start: 2025-09-12

## 2025-06-15 RX ORDER — EPINEPHRINE 1 MG/ML
0.3 INJECTION, SOLUTION INTRAMUSCULAR; SUBCUTANEOUS EVERY 5 MIN PRN
OUTPATIENT
Start: 2025-07-18

## 2025-06-15 RX ORDER — DEXAMETHASONE 4 MG/1
12 TABLET ORAL ONCE
Start: 2025-06-20 | End: 2025-06-20

## 2025-06-15 RX ORDER — MEPERIDINE HYDROCHLORIDE 25 MG/ML
25 INJECTION INTRAMUSCULAR; INTRAVENOUS; SUBCUTANEOUS
OUTPATIENT
Start: 2025-09-12

## 2025-06-15 RX ORDER — EPINEPHRINE 1 MG/ML
0.3 INJECTION, SOLUTION INTRAMUSCULAR; SUBCUTANEOUS EVERY 5 MIN PRN
OUTPATIENT
Start: 2025-08-15

## 2025-06-21 DIAGNOSIS — C90.00 MULTIPLE MYELOMA NOT HAVING ACHIEVED REMISSION (H): ICD-10-CM

## 2025-06-24 RX ORDER — POTASSIUM CHLORIDE 750 MG/1
20 TABLET, EXTENDED RELEASE ORAL DAILY
Qty: 60 TABLET | Refills: 3 | Status: SHIPPED | OUTPATIENT
Start: 2025-06-24

## 2025-06-24 NOTE — TELEPHONE ENCOUNTER
Medication:Potassium  Last prescribing provider:Laura BRAMBILA  Last clinic visit date: 4/9/2025 Georgina BRAMBILA  Recommendations for requested medication:  Last lab  June 2nd Potassium 3.3  Any other pertinent information:routed to last provider Georgina

## 2025-07-01 ENCOUNTER — APPOINTMENT (OUTPATIENT)
Dept: LAB | Facility: CLINIC | Age: 55
End: 2025-07-01
Attending: STUDENT IN AN ORGANIZED HEALTH CARE EDUCATION/TRAINING PROGRAM
Payer: COMMERCIAL

## 2025-07-01 ENCOUNTER — INFUSION THERAPY VISIT (OUTPATIENT)
Dept: ONCOLOGY | Facility: CLINIC | Age: 55
End: 2025-07-01
Attending: REGISTERED NURSE
Payer: COMMERCIAL

## 2025-07-01 ENCOUNTER — ONCOLOGY VISIT (OUTPATIENT)
Dept: ONCOLOGY | Facility: CLINIC | Age: 55
End: 2025-07-01
Attending: STUDENT IN AN ORGANIZED HEALTH CARE EDUCATION/TRAINING PROGRAM
Payer: COMMERCIAL

## 2025-07-01 VITALS
OXYGEN SATURATION: 98 % | BODY MASS INDEX: 27.12 KG/M2 | RESPIRATION RATE: 16 BRPM | DIASTOLIC BLOOD PRESSURE: 77 MMHG | WEIGHT: 168 LBS | HEART RATE: 77 BPM | SYSTOLIC BLOOD PRESSURE: 128 MMHG | TEMPERATURE: 98 F

## 2025-07-01 DIAGNOSIS — C90.00 MULTIPLE MYELOMA NOT HAVING ACHIEVED REMISSION (H): Primary | ICD-10-CM

## 2025-07-01 LAB
ALBUMIN SERPL BCG-MCNC: 4.5 G/DL (ref 3.5–5.2)
ALP SERPL-CCNC: 61 U/L (ref 40–150)
ALT SERPL W P-5'-P-CCNC: 19 U/L (ref 0–70)
ANION GAP SERPL CALCULATED.3IONS-SCNC: 13 MMOL/L (ref 7–15)
AST SERPL W P-5'-P-CCNC: 30 U/L (ref 0–45)
BASOPHILS # BLD AUTO: 0.1 10E3/UL (ref 0–0.2)
BASOPHILS NFR BLD AUTO: 1 %
BILIRUB SERPL-MCNC: 0.7 MG/DL
BUN SERPL-MCNC: 16.6 MG/DL (ref 6–20)
CALCIUM SERPL-MCNC: 8.9 MG/DL (ref 8.8–10.4)
CHLORIDE SERPL-SCNC: 107 MMOL/L (ref 98–107)
CREAT SERPL-MCNC: 1.34 MG/DL (ref 0.67–1.17)
EGFRCR SERPLBLD CKD-EPI 2021: 62 ML/MIN/1.73M2
EOSINOPHIL # BLD AUTO: 0.1 10E3/UL (ref 0–0.7)
EOSINOPHIL NFR BLD AUTO: 2 %
ERYTHROCYTE [DISTWIDTH] IN BLOOD BY AUTOMATED COUNT: 15.3 % (ref 10–15)
GLUCOSE SERPL-MCNC: 100 MG/DL (ref 70–99)
HCO3 SERPL-SCNC: 19 MMOL/L (ref 22–29)
HCT VFR BLD AUTO: 44.3 % (ref 40–53)
HGB BLD-MCNC: 15.2 G/DL (ref 13.3–17.7)
IMM GRANULOCYTES # BLD: 0 10E3/UL
IMM GRANULOCYTES NFR BLD: 0 %
LYMPHOCYTES # BLD AUTO: 1.5 10E3/UL (ref 0.8–5.3)
LYMPHOCYTES NFR BLD AUTO: 23 %
MCH RBC QN AUTO: 32.1 PG (ref 26.5–33)
MCHC RBC AUTO-ENTMCNC: 34.3 G/DL (ref 31.5–36.5)
MCV RBC AUTO: 94 FL (ref 78–100)
MONOCYTES # BLD AUTO: 0.6 10E3/UL (ref 0–1.3)
MONOCYTES NFR BLD AUTO: 9 %
NEUTROPHILS # BLD AUTO: 4.2 10E3/UL (ref 1.6–8.3)
NEUTROPHILS NFR BLD AUTO: 65 %
NRBC # BLD AUTO: 0 10E3/UL
NRBC BLD AUTO-RTO: 0 /100
PLATELET # BLD AUTO: 271 10E3/UL (ref 150–450)
POTASSIUM SERPL-SCNC: 4.1 MMOL/L (ref 3.4–5.3)
PROT SERPL-MCNC: 6.4 G/DL (ref 6.4–8.3)
RBC # BLD AUTO: 4.73 10E6/UL (ref 4.4–5.9)
SODIUM SERPL-SCNC: 139 MMOL/L (ref 135–145)
TOTAL PROTEIN SERUM FOR ELP: 6.2 G/DL (ref 6.4–8.3)
WBC # BLD AUTO: 6.5 10E3/UL (ref 4–11)

## 2025-07-01 PROCEDURE — 86334 IMMUNOFIX E-PHORESIS SERUM: CPT | Performed by: PATHOLOGY

## 2025-07-01 PROCEDURE — 36415 COLL VENOUS BLD VENIPUNCTURE: CPT

## 2025-07-01 PROCEDURE — 250N000011 HC RX IP 250 OP 636: Mod: JZ | Performed by: STUDENT IN AN ORGANIZED HEALTH CARE EDUCATION/TRAINING PROGRAM

## 2025-07-01 PROCEDURE — 84155 ASSAY OF PROTEIN SERUM: CPT

## 2025-07-01 PROCEDURE — 85004 AUTOMATED DIFF WBC COUNT: CPT

## 2025-07-01 PROCEDURE — 82784 ASSAY IGA/IGD/IGG/IGM EACH: CPT

## 2025-07-01 PROCEDURE — 96401 CHEMO ANTI-NEOPL SQ/IM: CPT

## 2025-07-01 PROCEDURE — 84165 PROTEIN E-PHORESIS SERUM: CPT | Mod: TC | Performed by: PATHOLOGY

## 2025-07-01 PROCEDURE — 83521 IG LIGHT CHAINS FREE EACH: CPT

## 2025-07-01 PROCEDURE — 99214 OFFICE O/P EST MOD 30 MIN: CPT | Performed by: STUDENT IN AN ORGANIZED HEALTH CARE EDUCATION/TRAINING PROGRAM

## 2025-07-01 PROCEDURE — 99213 OFFICE O/P EST LOW 20 MIN: CPT | Mod: 25 | Performed by: STUDENT IN AN ORGANIZED HEALTH CARE EDUCATION/TRAINING PROGRAM

## 2025-07-01 RX ADMIN — DARATUMUMAB AND HYALURONIDASE-FIHJ (HUMAN RECOMBINANT) 1800 MG: 1800; 30000 INJECTION SUBCUTANEOUS at 15:34

## 2025-07-01 ASSESSMENT — PAIN SCALES - GENERAL: PAINLEVEL_OUTOF10: NO PAIN (0)

## 2025-07-01 NOTE — NURSING NOTE
Chief Complaint   Patient presents with    Blood Draw     Labs collected from venipuncture by RN. Vitals taken. Checked in for appointment(s).      Labs collected from venipuncture by RN. Vitals taken. Checked in for appointment(s).     Niurka Leon RN

## 2025-07-01 NOTE — PROGRESS NOTES
[facility]  Date of visit: 07/01/2025        Oncologic History  - 4/30/2021 M spike of 34.8 in urine.M spike in blood 0.2; IgG 702 with depressed IgA and IgM. Beta 2 microglobulin 2.7. Lambda  with K/L ratio of 0.01. WBC 11.1 with absolute eos of 1.0. hgb, plt, Cr (1.1), and albumin WNL.    -4/30/2021 CT abd/pelvis showed sclerotic marrow changes with numerous lytic appearing lesions throughout the imaged portions of the spine, pelvis and ribs.      -PET scan 5/11/2021 Numerous osteolytic lesions which predominantly demonstrate uptake below liver background levels. There is one intraosseous lesion in the left lateral 9th rib that demonstrates uptake above background, possibly due to nondisplaced fracture. Adjacent to this lesion is mild hypermetabolism to the left pleura, with new small left pleural effusion, suspicious for malignant effusion versus posttraumatic effusion. Pleural uptake may represent extramedullary myeloma extending along the intercostal space versus inflammation.    - BM bx 5/17/2021 with flow showing 4.0% plasma cells which express CD19 (dim), CD38 (bright), CD45 (dim), , and monotypic cytoplasmic lambda immunoglobulin light chains but lack CD20 and CD56.  Hypercellular bone marrow for age (approximately 75% cellularity) with adequate trilineage hematopoiesis. Plasma cell infiltrate: Interstitial, lambda monotypic and representing approximately 60% the bone marrow cellularity, by  immunohistochemical stain. Cytpgenetics with 2 related hypodiploid clones. One with loss of Y, monosomy 13 and 14 (5%) and one with translocation of 8p and 14, derivative 16 with long arm replaced by extra copy 1q,monosomy 21. FISH showed gain of 1q, loss of 13 and 14, IGH-MYC fusion t(8:14)    - Started Miracle-RVd 21 day with velcade on days 1,8,15.     -Cycle 2 Miracle-RVd. Dose reduce dex to 80 mg d/t fatigue and stomach upset on dex days. Also having cough with  basilar streaking on CXR    - 8/31/2021 BM bx -rare suspicious plasma cells in touch imprint. No increase in plasma cells by morph.    -8/31/2021 PET/CT Diffuse osteolytic lesions throughout the axial and appendicular skeleton. Increased sclerosis since prior exam 5/11/2021 without increased metabolism or size.  Hypermetabolic pretracheal lymph node as well as hypermetabolic level 2 lymph nodes within the right neck. These lymph nodes are likely reactive from autoimmune activation via medical therapy. Hypermetabolic subcentimeter nodules within the thyroid gland    - 11/11/2021 Autologous BMT    - 2/21/22 started maintenance with Revlimid 10 mg and daratumumab monthly; did Revlimid alone for C1 due to low IgG levels, added daratumumab starting C2.  Initially did Revlimid daily, decreased to 21/28 days in Nov 2023 to help minimize side effects    Interval Hx:   Feeling well overall.  Denies recent illness.  No recent URI. 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     ROS neg other than the symptoms noted above in the HPI.    Physical Exam:  /77   Pulse 77   Temp 98  F (36.7  C)   Resp 16   Wt 76.2 kg (168 lb)   SpO2 98%   BMI 27.12 kg/m    Constitutional: NAD  Eyes: no scleral icterus  ENT: no oral lesions, gums look healthy although his dentist mentioned they had darkened somewhat  Pulm: CTAB  CV: RRR, no m/r/g  GI: bowel sounds present, soft and nontender to palpation  MSK: No edema in the extremities  Neuro: alert, conversant, normal gait  Psych: appropriate mood and affect          Allergies   Allergen Reactions    Blood Transfusion Related (Informational Only) Other (See Comments)     Patient has a history of a clinically significant antibody against RBC antigens.  A delay in compatible RBCs may occur.      Current Outpatient Medications   Medication Sig Dispense Refill    albuterol (PROAIR HFA/PROVENTIL HFA/VENTOLIN HFA) 108 (90 Base) MCG/ACT inhaler Inhale 2 puffs into the lungs  "4 times daily 18 g 0    aspirin (ASA) 81 MG EC tablet Take 1 tablet (81 mg) by mouth daily      calcium carbonate-vitamin D (OSCAL) 500-5 MG-MCG tablet Take 1 tablet by mouth daily. 90 tablet 3    ferrous sulfate (FEROSUL) 325 (65 Fe) MG tablet Take 1 tablet (325 mg) by mouth daily (with breakfast). 30 tablet 2    hydrochlorothiazide (HYDRODIURIL) 25 MG tablet Take 1 tablet (25 mg) by mouth daily. 90 tablet 3    LENalidomide (REVLIMID) 10 MG CAPS capsule Take 1 capsule (10 mg) by mouth daily Days 1-21 of each cycle 21 capsule 0    levothyroxine (SYNTHROID/LEVOTHROID) 125 MCG tablet Take 1 tablet (125 mcg) by mouth every morning (before breakfast). 90 tablet 1    losartan (COZAAR) 50 MG tablet Take 1 tablet (50 mg) by mouth daily. 90 tablet 3    Needle, Disp, (B-D BLUNT FILL NEEDLE) 18G X 1-1/2\" MISC For use with testosterone injection: Disposable syringe and needles, use one 18 G needle to draw up medication then switch to 23 G injection needle 25 each 3    Needle, Disp, (BD DISP NEEDLE) 23G X 1\" MISC For use with testosterone injection: Disposable syringe and needles, use one 18 G needle to draw up medication then switch to 23 G injection needle 25 each 3    potassium chloride carson ER (KLOR-CON M10) 10 MEQ CR tablet TAKE TWO TABLETS BY MOUTH ONCE DAILY 60 tablet 3    rosuvastatin (CRESTOR) 40 MG tablet Take 1 tablet (40 mg) by mouth daily. 90 tablet 4    syringe, disposable, 1 ML MISC For use with testosterone injection: Disposable syringe and 2 needles, use one 18 G needle to draw up medication then switch to 23 G injection needle 25 each 3    testosterone cypionate (DEPOTESTOSTERONE) 200 MG/ML injection Inject 0.75 mLs (150 mg) into the muscle every 14 days. 10 mL 0    vitamin C with B complex (B COMPLEX-C) tablet Take 1 tablet by mouth daily. 90 tablet 3     No current facility-administered medications for this visit.     Most Recent 3 CBC's:  Recent Labs   Lab Test 06/02/25  1350 05/05/25  1354 04/09/25  1525 "   WBC 7.3 7.1 5.8   HGB 15.1 15.1 14.9   MCV 92 92 91    311 324     Most Recent 3 BMP's:  Recent Labs   Lab Test 06/02/25  1350 05/05/25  1354 04/09/25  1525    136 138   POTASSIUM 3.3* 4.0 3.6   CHLORIDE 102 104 104   CO2 22 23 22   BUN 12.5 15.9 14.0   CR 1.15 1.21* 1.08   ANIONGAP 13 9 12   HARDEEP 8.8 9.1 8.8   * 99 121*   PROTTOTAL 6.5 6.5 6.5   ALBUMIN 4.5 4.5 4.4    Most Recent 3 LFT's:  Recent Labs   Lab Test 06/02/25  1350 05/05/25  1354 04/09/25  1525   AST 35 35 29   ALT 21 22 19   ALKPHOS 65 61 61   BILITOTAL 0.7 0.5 0.4    Most Recent 2 TSH and T4:  Recent Labs   Lab Test 06/02/25  1350 11/08/24  1053 07/19/24  1001 12/19/22  1103 06/17/22  1332   TSH 3.18 0.76 0.05*   < > 0.07*   T4  --   --  1.20  --  1.36    < > = values in this interval not displayed.         Assessment and Plan  Multiple myeloma with high risk cytogenetics  - Miracle-RVD with VGPR, then underwent autologous transplant 11/11/21.   - Given his high risk cytogenetics he started miracle + revlimid maintenance therapy 2/21/22  - Completed 2 years of zometa in Sept 2023. Continue Vit D.   - Revlimid was decreased to 21/28 days in Nov 2023 to try to minimize side effects (dysgeusia, flu-like symptoms on days following daratumumab)  - He is currently tolerating treatment well without side-effects.   - Labs monthly; clinic visits every 3 months    Ppx/ID  - Continue acyclovir daily for viral ppx   - Pneumococcal vaccine given 02/02/24   - Got both Shingrix vaccines 3/4/24 and 5/7/2024  - Hep A vaccine given 5/14/24--to be repeated in 12 weeks  - MMR vaccine 5/14/24  - Received influenza and covid vaccines for 2024    Hypogammaglobulinemia   Start monthly IV Ig if IgG <300  - If he starts to have repeat URI could consider raising parameters for IVIG    Borderline low Vit D  -now normalized to 38 with dietary interventions      Barbi Vazquez MD PhD

## 2025-07-01 NOTE — PATIENT INSTRUCTIONS
[facility] Triage and after hours / weekends / holidays:  [phone] option 5, option 2    Please call the triage or after hours line if you experience a temperature greater than or equal to 100.4, shaking chills, have uncontrolled nausea, vomiting and/or diarrhea, dizziness, shortness of breath, chest pain, bleeding, unexplained bruising, or if you have any other new/concerning symptoms, questions or concerns.      If you are having any concerning symptoms or wish to speak to a provider before your next infusion visit, please call triage to notify your care team so we can adequately serve you.     If you need a refill on a narcotic prescription or other medication, please call before your infusion appointment.      July 2025 Sunday Monday Tuesday Wednesday Thursday Friday Saturday             1    Lab Peripheral   2:30 PM   (15 min.)   [facility] DRAW   [facility]    Return Patient   2:45 PM   (30 min.)   Barbi Vazquez MD   [facility]    Infusion 60   3:30 PM   (60 min.)    ONC INFUSION NURSE   [facility] 2     3     4     5       6     7     8     9     10     11     12       13     14     15     16     17     18     19       20     21     22     23     24     25     26       27     28     29     30     31                             August 2025 Sunday Monday Tuesday Wednesday Thursday Friday Saturday                            1     2       3     4     5     6     7     8     9       10     11     12     13     14     15    New Headache  10:45 AM   (60 min.)   Edita Chiu PA-C   [facility] 16       17     18     19     20     21     22     23       24     25     26     27     28     29     30       31                                                      Recent Results (from the past 24 hours)   Comprehensive metabolic panel    Collection Time: 07/01/25  2:37 PM    Result Value Ref Range    Sodium 139 135 - 145 mmol/L    Potassium 4.1 3.4 - 5.3 mmol/L    Carbon Dioxide (CO2) 19 (L) 22 - 29 mmol/L    Anion Gap 13 7 - 15 mmol/L    Urea Nitrogen 16.6 6.0 - 20.0 mg/dL    Creatinine 1.34 (H) 0.67 - 1.17 mg/dL    GFR Estimate 62 >60 mL/min/1.73m2    Calcium 8.9 8.8 - 10.4 mg/dL    Chloride 107 98 - 107 mmol/L    Glucose 100 (H) 70 - 99 mg/dL    Alkaline Phosphatase 61 40 - 150 U/L    AST 30 0 - 45 U/L    ALT 19 0 - 70 U/L    Protein Total 6.4 6.4 - 8.3 g/dL    Albumin 4.5 3.5 - 5.2 g/dL    Bilirubin Total 0.7 <=1.2 mg/dL   CBC with platelets and differential    Collection Time: 07/01/25  2:37 PM   Result Value Ref Range    WBC Count 6.5 4.0 - 11.0 10e3/uL    RBC Count 4.73 4.40 - 5.90 10e6/uL    Hemoglobin 15.2 13.3 - 17.7 g/dL    Hematocrit 44.3 40.0 - 53.0 %    MCV 94 78 - 100 fL    MCH 32.1 26.5 - 33.0 pg    MCHC 34.3 31.5 - 36.5 g/dL    RDW 15.3 (H) 10.0 - 15.0 %    Platelet Count 271 150 - 450 10e3/uL    % Neutrophils 65 %    % Lymphocytes 23 %    % Monocytes 9 %    % Eosinophils 2 %    % Basophils 1 %    % Immature Granulocytes 0 %    NRBCs per 100 WBC 0 <1 /100    Absolute Neutrophils 4.2 1.6 - 8.3 10e3/uL    Absolute Lymphocytes 1.5 0.8 - 5.3 10e3/uL    Absolute Monocytes 0.6 0.0 - 1.3 10e3/uL    Absolute Eosinophils 0.1 0.0 - 0.7 10e3/uL    Absolute Basophils 0.1 0.0 - 0.2 10e3/uL    Absolute Immature Granulocytes 0.0 <=0.4 10e3/uL    Absolute NRBCs 0.0 10e3/uL   Total Protein, Serum for ELP    Collection Time: 07/01/25  2:37 PM   Result Value Ref Range    Total Protein Serum for ELP 6.2 (L) 6.4 - 8.3 g/dL

## 2025-07-01 NOTE — NURSING NOTE
"Oncology Rooming Note    July 1, 2025 2:46 PM   June Ronquillo is a 55 year old male who presents for:    Chief Complaint   Patient presents with    Blood Draw     Labs collected from venipuncture by RN. Vitals taken. Checked in for appointment(s).     Oncology Clinic Visit     Multiple myeloma not having achieved remission     Initial Vitals: /77   Pulse 77   Temp 98  F (36.7  C)   Resp 16   Wt 76.2 kg (168 lb)   SpO2 98%   BMI 27.12 kg/m   Estimated body mass index is 27.12 kg/m  as calculated from the following:    Height as of 5/14/25: 1.676 m (5' 6\").    Weight as of this encounter: 76.2 kg (168 lb). Body surface area is 1.88 meters squared.  No Pain (0) Comment: Data Unavailable   No LMP for male patient.  Allergies reviewed: Yes  Medications reviewed: Yes    Medications: Medication refills not needed today.  Pharmacy name entered into [employer]:    [facility] [place], [place] - [address]  [facility] - [place], [place] [address] [place] [address]  [facility] [place], [place] - [address]  [facility] - [place], [place] - [address]    Frailty Screening:   Is the patient here for a new oncology consult visit in cancer care? 2. No    PHQ9:  Did this patient require a PHQ9?: No      Clinical concerns: Patient states there are no new concerns to discuss with provider.  Dr Vazquez was not notified.         Niharika Marlow CMA              "

## 2025-07-01 NOTE — PROGRESS NOTES
Infusion Nursing Note:  June Ronquillo presents today for D1, C45 Darzalex Faspro.    Patient seen by provider today: Yes: Barbi Vazquez MD   present during visit today: Not Applicable.    Note: June presents to infusion today following their clinic visit. Pt denies any new concerns and wishes to proceed with treatment.      Intravenous Access:  No Intravenous access/labs at this visit.    Treatment Conditions:  Results reviewed, labs MET treatment parameters, ok to proceed with treatment.      Post Infusion Assessment:  Patient tolerated injection without incident.       Discharge Plan:   Patient declined prescription refills.  Discharge instructions reviewed with: Patient.  Patient and/or family verbalized understanding of discharge instructions and all questions answered.  AVS to patient via [employer].  Patient will return in 1 month for next infusion appointment. Request sent by provider to schedule.  Patient discharged in stable condition accompanied by: self.  Departure Mode: Ambulatory.      Bubba Carson RN

## 2025-07-01 NOTE — LETTER
7/1/2025      June Ronquillo  [address]  [place] [address]      Dear Colleague,    Thank you for referring your patient, June Ronquillo, to the [facility]. Please see a copy of my visit note below.        [facility]  Date of visit: 07/01/2025        Oncologic History  - 4/30/2021 M spike of 34.8 in urine.M spike in blood 0.2; IgG 702 with depressed IgA and IgM. Beta 2 microglobulin 2.7. Lambda  with K/L ratio of 0.01. WBC 11.1 with absolute eos of 1.0. hgb, plt, Cr (1.1), and albumin WNL.    -4/30/2021 CT abd/pelvis showed sclerotic marrow changes with numerous lytic appearing lesions throughout the imaged portions of the spine, pelvis and ribs.      -PET scan 5/11/2021 Numerous osteolytic lesions which predominantly demonstrate uptake below liver background levels. There is one intraosseous lesion in the left lateral 9th rib that demonstrates uptake above background, possibly due to nondisplaced fracture. Adjacent to this lesion is mild hypermetabolism to the left pleura, with new small left pleural effusion, suspicious for malignant effusion versus posttraumatic effusion. Pleural uptake may represent extramedullary myeloma extending along the intercostal space versus inflammation.    - BM bx 5/17/2021 with flow showing 4.0% plasma cells which express CD19 (dim), CD38 (bright), CD45 (dim), , and monotypic cytoplasmic lambda immunoglobulin light chains but lack CD20 and CD56.  Hypercellular bone marrow for age (approximately 75% cellularity) with adequate trilineage hematopoiesis. Plasma cell infiltrate: Interstitial, lambda monotypic and representing approximately 60% the bone marrow cellularity, by  immunohistochemical stain. Cytpgenetics with 2 related hypodiploid clones. One with loss of Y, monosomy 13 and 14 (5%) and one with translocation of 8p and 14, derivative 16 with long arm replaced by extra copy  1q,monosomy 21. FISH showed gain of 1q, loss of 13 and 14, IGH-MYC fusion t(8:14)    - Started Miracle-RVd 21 day with velcade on days 1,8,15.     -Cycle 2 Miracle-RVd. Dose reduce dex to 80 mg d/t fatigue and stomach upset on dex days. Also having cough with basilar streaking on CXR    - 8/31/2021 BM bx -rare suspicious plasma cells in touch imprint. No increase in plasma cells by morph.    -8/31/2021 PET/CT Diffuse osteolytic lesions throughout the axial and appendicular skeleton. Increased sclerosis since prior exam 5/11/2021 without increased metabolism or size.  Hypermetabolic pretracheal lymph node as well as hypermetabolic level 2 lymph nodes within the right neck. These lymph nodes are likely reactive from autoimmune activation via medical therapy. Hypermetabolic subcentimeter nodules within the thyroid gland    - 11/11/2021 Autologous BMT    - 2/21/22 started maintenance with Revlimid 10 mg and daratumumab monthly; did Revlimid alone for C1 due to low IgG levels, added daratumumab starting C2.  Initially did Revlimid daily, decreased to 21/28 days in Nov 2023 to help minimize side effects    Interval Hx:   Feeling well overall.  Denies recent illness.  No recent URI. 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     ROS neg other than the symptoms noted above in the HPI.    Physical Exam:  /77   Pulse 77   Temp 98  F (36.7  C)   Resp 16   Wt 76.2 kg (168 lb)   SpO2 98%   BMI 27.12 kg/m    Constitutional: NAD  Eyes: no scleral icterus  ENT: no oral lesions, gums look healthy although his dentist mentioned they had darkened somewhat  Pulm: CTAB  CV: RRR, no m/r/g  GI: bowel sounds present, soft and nontender to palpation  MSK: No edema in the extremities  Neuro: alert, conversant, normal gait  Psych: appropriate mood and affect          Allergies   Allergen Reactions     Blood Transfusion Related (Informational Only) Other (See Comments)     Patient has a history of a clinically  "significant antibody against RBC antigens.  A delay in compatible RBCs may occur.      Current Outpatient Medications   Medication Sig Dispense Refill     albuterol (PROAIR HFA/PROVENTIL HFA/VENTOLIN HFA) 108 (90 Base) MCG/ACT inhaler Inhale 2 puffs into the lungs 4 times daily 18 g 0     aspirin (ASA) 81 MG EC tablet Take 1 tablet (81 mg) by mouth daily       calcium carbonate-vitamin D (OSCAL) 500-5 MG-MCG tablet Take 1 tablet by mouth daily. 90 tablet 3     ferrous sulfate (FEROSUL) 325 (65 Fe) MG tablet Take 1 tablet (325 mg) by mouth daily (with breakfast). 30 tablet 2     hydrochlorothiazide (HYDRODIURIL) 25 MG tablet Take 1 tablet (25 mg) by mouth daily. 90 tablet 3     LENalidomide (REVLIMID) 10 MG CAPS capsule Take 1 capsule (10 mg) by mouth daily Days 1-21 of each cycle 21 capsule 0     levothyroxine (SYNTHROID/LEVOTHROID) 125 MCG tablet Take 1 tablet (125 mcg) by mouth every morning (before breakfast). 90 tablet 1     losartan (COZAAR) 50 MG tablet Take 1 tablet (50 mg) by mouth daily. 90 tablet 3     Needle, Disp, (B-D BLUNT FILL NEEDLE) 18G X 1-1/2\" MISC For use with testosterone injection: Disposable syringe and needles, use one 18 G needle to draw up medication then switch to 23 G injection needle 25 each 3     Needle, Disp, (BD DISP NEEDLE) 23G X 1\" MISC For use with testosterone injection: Disposable syringe and needles, use one 18 G needle to draw up medication then switch to 23 G injection needle 25 each 3     potassium chloride carson ER (KLOR-CON M10) 10 MEQ CR tablet TAKE TWO TABLETS BY MOUTH ONCE DAILY 60 tablet 3     rosuvastatin (CRESTOR) 40 MG tablet Take 1 tablet (40 mg) by mouth daily. 90 tablet 4     syringe, disposable, 1 ML MISC For use with testosterone injection: Disposable syringe and 2 needles, use one 18 G needle to draw up medication then switch to 23 G injection needle 25 each 3     testosterone cypionate (DEPOTESTOSTERONE) 200 MG/ML injection Inject 0.75 mLs (150 mg) into the " muscle every 14 days. 10 mL 0     vitamin C with B complex (B COMPLEX-C) tablet Take 1 tablet by mouth daily. 90 tablet 3     No current facility-administered medications for this visit.     Most Recent 3 CBC's:  Recent Labs   Lab Test 06/02/25  1350 05/05/25  1354 04/09/25  1525   WBC 7.3 7.1 5.8   HGB 15.1 15.1 14.9   MCV 92 92 91    311 324     Most Recent 3 BMP's:  Recent Labs   Lab Test 06/02/25  1350 05/05/25  1354 04/09/25  1525    136 138   POTASSIUM 3.3* 4.0 3.6   CHLORIDE 102 104 104   CO2 22 23 22   BUN 12.5 15.9 14.0   CR 1.15 1.21* 1.08   ANIONGAP 13 9 12   HARDEEP 8.8 9.1 8.8   * 99 121*   PROTTOTAL 6.5 6.5 6.5   ALBUMIN 4.5 4.5 4.4    Most Recent 3 LFT's:  Recent Labs   Lab Test 06/02/25  1350 05/05/25  1354 04/09/25  1525   AST 35 35 29   ALT 21 22 19   ALKPHOS 65 61 61   BILITOTAL 0.7 0.5 0.4    Most Recent 2 TSH and T4:  Recent Labs   Lab Test 06/02/25  1350 11/08/24  1053 07/19/24  1001 12/19/22  1103 06/17/22  1332   TSH 3.18 0.76 0.05*   < > 0.07*   T4  --   --  1.20  --  1.36    < > = values in this interval not displayed.         Assessment and Plan  Multiple myeloma with high risk cytogenetics  - Miracle-RVD with VGPR, then underwent autologous transplant 11/11/21.   - Given his high risk cytogenetics he started miracle + revlimid maintenance therapy 2/21/22  - Completed 2 years of zometa in Sept 2023. Continue Vit D.   - Revlimid was decreased to 21/28 days in Nov 2023 to try to minimize side effects (dysgeusia, flu-like symptoms on days following daratumumab)  - He is currently tolerating treatment well without side-effects.   - Labs monthly; clinic visits every 3 months    Ppx/ID  - Continue acyclovir daily for viral ppx   - Pneumococcal vaccine given 02/02/24   - Got both Shingrix vaccines 3/4/24 and 5/7/2024  - Hep A vaccine given 5/14/24--to be repeated in 12 weeks  - MMR vaccine 5/14/24  - Received influenza and covid vaccines for 2024    Hypogammaglobulinemia   Start monthly  IV Ig if IgG <300  - If he starts to have repeat URI could consider raising parameters for IVIG    Borderline low Vit D  -now normalized to 38 with dietary interventions      Barbi Vazquez MD PhD      Again, thank you for allowing me to participate in the care of your patient.        Sincerely,        Barbi Vazquez MD    Electronically signed

## 2025-07-02 LAB
ALBUMIN SERPL ELPH-MCNC: 4.3 G/DL (ref 3.7–5.1)
ALPHA1 GLOB SERPL ELPH-MCNC: 0.2 G/DL (ref 0.2–0.4)
ALPHA2 GLOB SERPL ELPH-MCNC: 0.5 G/DL (ref 0.5–0.9)
B-GLOBULIN SERPL ELPH-MCNC: 0.7 G/DL (ref 0.6–1)
GAMMA GLOB SERPL ELPH-MCNC: 0.5 G/DL (ref 0.7–1.6)
IGA SERPL-MCNC: 59 MG/DL (ref 84–499)
IGG SERPL-MCNC: 509 MG/DL (ref 610–1616)
IGM SERPL-MCNC: 26 MG/DL (ref 35–242)
KAPPA LC FREE SER-MCNC: 0.63 MG/DL (ref 0.33–1.94)
KAPPA LC FREE/LAMBDA FREE SER NEPH: 1.37 {RATIO} (ref 0.26–1.65)
LAMBDA LC FREE SERPL-MCNC: 0.46 MG/DL (ref 0.57–2.63)
LOCATION OF TASK: ABNORMAL
LOCATION OF TASK: NORMAL
M PROTEIN SERPL ELPH-MCNC: 0.1 G/DL
PROT PATTERN SERPL ELPH-IMP: ABNORMAL
PROT PATTERN SERPL IFE-IMP: NORMAL

## 2025-07-08 DIAGNOSIS — C90.00 MULTIPLE MYELOMA NOT HAVING ACHIEVED REMISSION (H): Primary | ICD-10-CM

## 2025-07-08 RX ORDER — LENALIDOMIDE 10 MG/1
10 CAPSULE ORAL DAILY
Qty: 21 CAPSULE | Refills: 0 | Status: SHIPPED | OUTPATIENT
Start: 2025-07-18

## 2025-07-09 ENCOUNTER — TELEPHONE (OUTPATIENT)
Dept: ONCOLOGY | Facility: CLINIC | Age: 55
End: 2025-07-09
Payer: COMMERCIAL

## 2025-07-09 NOTE — TELEPHONE ENCOUNTER
Oral Chemotherapy Monitoring Program   Medication: Revlimid  Rx: 10mg PO daily on days 1 through 21 of 28 day cycle   Auth #: [id number]   Risk Category: Adult Male  Routine survey questions reviewed.   Rx to be Escribed to [facility]    Yu Goldberg  [facility]  Oncology Pharmacy Liaison   [email or web address]  [phone] (phone)  [phone] (fax)

## 2025-08-15 ENCOUNTER — PRE VISIT (OUTPATIENT)
Dept: NEUROLOGY | Facility: CLINIC | Age: 55
End: 2025-08-15

## (undated) DEVICE — SUCTION MANIFOLD NEPTUNE 2 SYS 1 PORT 702-025-000

## (undated) DEVICE — PACK CENTRAL LINE INSERTION SAN32CLFCG

## (undated) DEVICE — LINEN TOWEL PACK X5 5464

## (undated) DEVICE — ENDO LIGATOR MUCOSECTOMY MULTI-BAND DUETTE DT-6-5F

## (undated) DEVICE — TRAY BONE MARROW BIOPSY ASC 640 31-0097A

## (undated) DEVICE — NDL 18GA 1.5" 305196

## (undated) DEVICE — CAP LUER LOCK MALE/FEMALE DUAL 2C6250

## (undated) DEVICE — DECANTER BAG 2002S

## (undated) DEVICE — SU MONOCRYL 5-0 P-3 18" UND Y493G

## (undated) DEVICE — ENDO PROBE COVER ULTRASOUND BALLOON LATEX  MAJ-249

## (undated) DEVICE — PACK ENDOSCOPY GI CUSTOM UMMC

## (undated) DEVICE — NDL BX BONE MARROW 11GA 4"

## (undated) DEVICE — ADH SKIN CLOSURE PREMIERPRO EXOFIN MICOR HV 0.5ML 3471

## (undated) DEVICE — NDL BONE MARROW ASPIRATION 15GA 2.8" RAN-1528

## (undated) DEVICE — NIM PROBE PRASS INCREMENTING TIP 8225825

## (undated) DEVICE — ENDO TUBING CO2 SMARTCAP STERILE DISP 100145CO2EXT

## (undated) DEVICE — GOWN XLG DISP 9545

## (undated) DEVICE — SU SILK 3-0 TIE 12X30" A304H

## (undated) DEVICE — SYR 10ML LL W/O NDL 302995

## (undated) DEVICE — NDL BONE BIOPSY TRAPLOCK 8GAX4"  DBMNJ0804TL

## (undated) DEVICE — GLOVE PROTEXIS W/NEU-THERA 6.5  2D73TE65

## (undated) DEVICE — ESU GROUND PAD ADULT W/CORD E7507

## (undated) DEVICE — DILATOR VASCULAR COONS 12FRX20CM DIST TPR G03929

## (undated) DEVICE — SPECIMEN CONTAINER W/10% BUFFERED FORMALIN 120ML 591201

## (undated) DEVICE — COVER ULTRASOUND PROBE W/GEL FLEXI-FEEL 6"X58" LF  25-FF658

## (undated) DEVICE — NDL 22GA 1.5"

## (undated) DEVICE — SU SILK 2-0 TIE 12X30" A305H

## (undated) DEVICE — GLOVE PROTEXIS POWDER FREE SMT 8.0  2D72PT80X

## (undated) DEVICE — ADH SKIN CLOSURE PREMIERPRO EXOFIN 1.0ML 3470

## (undated) DEVICE — SOL NACL 0.9% IRRIG 500ML BOTTLE 2F7123

## (undated) DEVICE — KIT INTRODUCER FLUENT MICRO 5FRX10CM ECHO TIP KIT-038-04

## (undated) DEVICE — SOL WATER IRRIG 500ML BOTTLE 2F7113

## (undated) DEVICE — SYR 01ML 25GA 5/8" TBC

## (undated) DEVICE — CLIP HORIZON SM RED WIDE SLOT 001201

## (undated) DEVICE — DRSG TEGADERM 2 3/8X2 3/4" 1624W

## (undated) DEVICE — PREP POVIDONE IODINE SOLUTION 10% 4OZ BOTTLE 29906-004

## (undated) DEVICE — GOWN IMPERVIOUS 2XL BLUE

## (undated) DEVICE — PACK ENT MINOR CUSTOM ASC

## (undated) DEVICE — DRSG BIOPATCH GERMICIDAL SPLIT SPONGE 7MM LG

## (undated) DEVICE — TUBING SUCTION MEDI-VAC 1/4"X20' N620A

## (undated) DEVICE — PREP PAD ALCOHOL 6818

## (undated) DEVICE — BLADE KNIFE SURG 11 371111

## (undated) DEVICE — KIT CONNECTOR FOR OLYMPUS ENDOSCOPES DEFENDO 100310

## (undated) DEVICE — LINEN GOWN XLG 5407

## (undated) DEVICE — CLIP HORIZON MED BLUE 002200

## (undated) DEVICE — GOWN LG DISP 9515

## (undated) DEVICE — ASSURANCE CLIP

## (undated) DEVICE — SURGICEL FIBRILLAR HEMOSTAT 1"X2" 1961

## (undated) DEVICE — SYR 20ML LL W/O NDL 302830

## (undated) DEVICE — SNARE CAPIVATOR ROUND COLD SNR BX10 M00561101

## (undated) DEVICE — SUCTION MANIFOLD NEPTUNE 2 SYS 4 PORT 0702-020-000

## (undated) DEVICE — SU CHROMIC 3-0 SH 27" G122H

## (undated) DEVICE — SPECIMEN CONTAINER 3OZ W/FORMALIN 59901

## (undated) DEVICE — NDL BX BONE MARROW 15GA 2" A-152

## (undated) DEVICE — PREP CHLORAPREP W/ORANGE TINT 10.5ML 260715

## (undated) DEVICE — ESU ELEC BLADE 2.75" COATED/INSULATED E1455

## (undated) DEVICE — ESU PENCIL SMOKE EVAC W/ROCKER SWITCH 0703-047-000

## (undated) DEVICE — KIT ENDO FIRST STEP DISINFECTANT 200ML W/POUCH EP-4

## (undated) DEVICE — SU ETHILON 2-0 FS 18" 664H

## (undated) DEVICE — DRSG TELFA 2X3"

## (undated) DEVICE — SOL WATER IRRIG 1000ML BOTTLE 2F7114

## (undated) DEVICE — KIT ENDO TURNOVER/PROCEDURE CARRY-ON 101822

## (undated) DEVICE — ENDO CAP AND TUBING STERILE FOR ENDOGATOR  100130

## (undated) DEVICE — COVER EASY EQUIP BAG W/BAND LATEX FREE EZ-28

## (undated) RX ORDER — PROPOFOL 10 MG/ML
INJECTION, EMULSION INTRAVENOUS
Status: DISPENSED
Start: 2021-05-17

## (undated) RX ORDER — LIDOCAINE HYDROCHLORIDE 20 MG/ML
INJECTION, SOLUTION EPIDURAL; INFILTRATION; INTRACAUDAL; PERINEURAL
Status: DISPENSED
Start: 2022-05-03

## (undated) RX ORDER — FENTANYL CITRATE-0.9 % NACL/PF 10 MCG/ML
PLASTIC BAG, INJECTION (ML) INTRAVENOUS
Status: DISPENSED
Start: 2022-05-03

## (undated) RX ORDER — PROPOFOL 10 MG/ML
INJECTION, EMULSION INTRAVENOUS
Status: DISPENSED
Start: 2022-05-03

## (undated) RX ORDER — CEFAZOLIN SODIUM 1 G/3ML
INJECTION, POWDER, FOR SOLUTION INTRAMUSCULAR; INTRAVENOUS
Status: DISPENSED
Start: 2021-11-03

## (undated) RX ORDER — ACETAMINOPHEN 325 MG/1
TABLET ORAL
Status: DISPENSED
Start: 2022-05-09

## (undated) RX ORDER — ACETAMINOPHEN 325 MG/1
TABLET ORAL
Status: DISPENSED
Start: 2022-03-07

## (undated) RX ORDER — ACETAMINOPHEN 325 MG/1
TABLET ORAL
Status: DISPENSED
Start: 2021-11-03

## (undated) RX ORDER — ACETAMINOPHEN 325 MG/1
TABLET ORAL
Status: DISPENSED
Start: 2023-11-16

## (undated) RX ORDER — ONDANSETRON 2 MG/ML
INJECTION INTRAMUSCULAR; INTRAVENOUS
Status: DISPENSED
Start: 2022-05-03

## (undated) RX ORDER — CALCIUM GLUCONATE 94 MG/ML
INJECTION, SOLUTION INTRAVENOUS
Status: DISPENSED
Start: 2021-11-04

## (undated) RX ORDER — ACETAMINOPHEN 325 MG/1
TABLET ORAL
Status: DISPENSED
Start: 2022-12-05

## (undated) RX ORDER — OXYCODONE HYDROCHLORIDE 5 MG/1
TABLET ORAL
Status: DISPENSED
Start: 2022-05-03

## (undated) RX ORDER — LIDOCAINE HYDROCHLORIDE 10 MG/ML
INJECTION, SOLUTION EPIDURAL; INFILTRATION; INTRACAUDAL; PERINEURAL
Status: DISPENSED
Start: 2021-11-02

## (undated) RX ORDER — FENTANYL CITRATE 50 UG/ML
INJECTION, SOLUTION INTRAMUSCULAR; INTRAVENOUS
Status: DISPENSED
Start: 2024-02-08

## (undated) RX ORDER — FENTANYL CITRATE 50 UG/ML
INJECTION, SOLUTION INTRAMUSCULAR; INTRAVENOUS
Status: DISPENSED
Start: 2018-10-30

## (undated) RX ORDER — ALBUTEROL SULFATE 0.83 MG/ML
SOLUTION RESPIRATORY (INHALATION)
Status: DISPENSED
Start: 2021-10-13

## (undated) RX ORDER — FENTANYL CITRATE 50 UG/ML
INJECTION, SOLUTION INTRAMUSCULAR; INTRAVENOUS
Status: DISPENSED
Start: 2022-05-03

## (undated) RX ORDER — GABAPENTIN 300 MG/1
CAPSULE ORAL
Status: DISPENSED
Start: 2022-12-05

## (undated) RX ORDER — FENTANYL CITRATE 50 UG/ML
INJECTION, SOLUTION INTRAMUSCULAR; INTRAVENOUS
Status: DISPENSED
Start: 2024-02-01

## (undated) RX ORDER — LIDOCAINE HYDROCHLORIDE 10 MG/ML
INJECTION, SOLUTION EPIDURAL; INFILTRATION; INTRACAUDAL; PERINEURAL
Status: DISPENSED
Start: 2021-12-02

## (undated) RX ORDER — DEXAMETHASONE SODIUM PHOSPHATE 10 MG/ML
INJECTION, SOLUTION INTRAMUSCULAR; INTRAVENOUS
Status: DISPENSED
Start: 2022-05-03

## (undated) RX ORDER — ACETAMINOPHEN 325 MG/1
TABLET ORAL
Status: DISPENSED
Start: 2021-11-04